# Patient Record
Sex: FEMALE | Race: BLACK OR AFRICAN AMERICAN | NOT HISPANIC OR LATINO | Employment: FULL TIME | URBAN - METROPOLITAN AREA
[De-identification: names, ages, dates, MRNs, and addresses within clinical notes are randomized per-mention and may not be internally consistent; named-entity substitution may affect disease eponyms.]

---

## 2017-01-16 ENCOUNTER — OFFICE VISIT (OUTPATIENT)
Dept: CARDIOLOGY | Facility: CLINIC | Age: 47
End: 2017-01-16
Payer: MEDICARE

## 2017-01-16 VITALS
SYSTOLIC BLOOD PRESSURE: 133 MMHG | HEIGHT: 65 IN | WEIGHT: 211 LBS | HEART RATE: 102 BPM | DIASTOLIC BLOOD PRESSURE: 90 MMHG | BODY MASS INDEX: 35.16 KG/M2

## 2017-01-16 DIAGNOSIS — E11.22 TYPE 2 DIABETES MELLITUS WITH CHRONIC KIDNEY DISEASE ON CHRONIC DIALYSIS, UNSPECIFIED LONG TERM INSULIN USE STATUS: ICD-10-CM

## 2017-01-16 DIAGNOSIS — N18.6 TYPE 2 DIABETES MELLITUS WITH CHRONIC KIDNEY DISEASE ON CHRONIC DIALYSIS, UNSPECIFIED LONG TERM INSULIN USE STATUS: ICD-10-CM

## 2017-01-16 DIAGNOSIS — I73.9 PAD (PERIPHERAL ARTERY DISEASE): ICD-10-CM

## 2017-01-16 DIAGNOSIS — M79.605 PAIN IN BOTH LOWER EXTREMITIES: ICD-10-CM

## 2017-01-16 DIAGNOSIS — I25.10 CORONARY ARTERY DISEASE INVOLVING NATIVE CORONARY ARTERY OF NATIVE HEART WITHOUT ANGINA PECTORIS: Primary | ICD-10-CM

## 2017-01-16 DIAGNOSIS — Z99.2 TYPE 2 DIABETES MELLITUS WITH CHRONIC KIDNEY DISEASE ON CHRONIC DIALYSIS, UNSPECIFIED LONG TERM INSULIN USE STATUS: ICD-10-CM

## 2017-01-16 DIAGNOSIS — I10 ESSENTIAL HYPERTENSION: ICD-10-CM

## 2017-01-16 DIAGNOSIS — M79.604 PAIN IN BOTH LOWER EXTREMITIES: ICD-10-CM

## 2017-01-16 DIAGNOSIS — E78.00 PURE HYPERCHOLESTEROLEMIA: ICD-10-CM

## 2017-01-16 PROCEDURE — 99999 PR PBB SHADOW E&M-EST. PATIENT-LVL III: CPT | Mod: PBBFAC,,, | Performed by: INTERNAL MEDICINE

## 2017-01-16 PROCEDURE — 99214 OFFICE O/P EST MOD 30 MIN: CPT | Mod: S$PBB,,, | Performed by: INTERNAL MEDICINE

## 2017-01-16 PROCEDURE — 99213 OFFICE O/P EST LOW 20 MIN: CPT | Mod: PBBFAC,PO | Performed by: INTERNAL MEDICINE

## 2017-01-16 RX ORDER — INSULIN HUMAN 100 [IU]/ML
200 INJECTION, SOLUTION PARENTERAL NIGHTLY
COMMUNITY
Start: 2017-01-13 | End: 2017-11-29

## 2017-01-16 RX ORDER — ASPIRIN 81 MG/1
81 TABLET ORAL DAILY
Qty: 30 TABLET | Refills: 12 | Status: ON HOLD
Start: 2017-01-16 | End: 2019-01-01 | Stop reason: HOSPADM

## 2017-01-16 NOTE — PATIENT INSTRUCTIONS
Taking Aspirin for Atherosclerosis       Aspirin is a medicine often prescribed to treat atherosclerosis. This condition affects your arteries. These are the blood vessels that carry blood away from your heart. Having atherosclerosis means youre at greater risk of a heart attack or stroke. Aspirin can help prevent these from happening.  How atherosclerosis affects your arteries   A fatty material (plaque) can build up in your arteries. This makes it harder for blood to flow through them. A blood clot can then form on the plaque. This may block the artery, cutting off blood flow. This can cause conditions such as coronary artery disease (CAD) and peripheral arterial disease (PAD):  · CAD occurs when plaque builds up in the coronary artery. This artery supplies the heart with oxygen-rich blood.  · PAD occurs when plaque forms in leg arteries.  The same things that cause CAD and PAD can also cause plaque to form in other arteries in your body, such as those in the brain. When plaque occurs in any of these arteries, it raises your risk of heart attack or stroke.  What aspirin does  Aspirin is a blood-thinner (antiplatelet medicine). It helps keep blood clots from forming. This reduces the risk of blockage. Aspirin can be taken daily if you are at high risk of or have already had a heart attack or stroke. It is also used after a procedure called a stent placement. This is when a tiny wire mesh tube, or stent, is placed in an artery to keep it open. Aspirin helps prevent blood clots from forming on the stent.  Taking aspirin safely  Tell your healthcare provider about any medicines you are taking. This includes:  · All prescription medicines  · Over-the-counter medicines  · Herbs, vitamins, and other supplements  Also mention if you have a history of ulcers or bleeding problems. Ask whether you will need to stop taking aspirin before having surgery or dental work. Always take medicines as directed.  Tips for taking  aspirin  · Have a routine. For example, take aspirin with the same meal each day.  · Dont take more than prescribed. A low dose gives the same benefit as a higher one, with a lower risk of side effects.  · Dont skip doses. Aspirin needs to be taken each day to work well.  · Keep track of what you take. A pillbox with days of the week can help, especially if you take several medicines. Or use a list or chart to keep track.  When to call your healthcare provider  Side effects of aspirin are not usually serious. If you do have problems, changing your dose may help. Call your healthcare provider if you have any of the following:  · Excessive bruising (some bruising is normal)  · Nosebleeds, bleeding gums, or other excessive bleeding  · An upset stomach or stomach pain   © 8748-1191 [employer]. [address] [place] [address], [place] [address]. All rights reserved. This information is not intended as a substitute for professional medical care. Always follow your healthcare professional's instructions.

## 2017-01-16 NOTE — PROGRESS NOTES
Subjective:   Patient ID:  Jenni Todd is a 46 y.o. female who presents for evaluation of Peripheral Arterial Disease; Hypertension; Hyperlipidemia; and Obesity      HPI:       She is here by recommendation of Dr. Watt (nephrology) and Dr. Yang (podiatry) for PAD. She denies any claudication or ulcers. She has diffuse body aches including peripheral neuropathy and lumbar radiculopathy. Dr. Yang noted weak pedal pulses during the last visit. She has CAD s/p PCI of RCA for NSTEMI in 2012 with two BMS 3.0 x 26 and 2.5 x 18 mm. She is on aspirin and plavix for DAPT. Normal EF. ESRD on PD, HTN, HLP, DM, Obesity        TINO 11/2016: medial calcinosis with monophasic waveforms      Arterial ultrasound 11/2016:   PSV in cm/sec      R ,pSFA 183, mSFA 220, dSFA 68, POP 51, PT 70, AT 66, and DP 24  L CFA  107,pSFA 139, mSFA 183, dSFA 99, , PT 18, AT 63, and DP 28      Echo 11/2016:      1 - Normal left ventricular systolic function (EF 60-65%).     2 - Normal left ventricular diastolic function.     3 - Normal right ventricular systolic function .     4 - Trivial to mild aortic stenosis, ARNOLD = 1.95 cm2, peak velocity = 2.1 m/s, mean gradient = 10.0 mmHg.     5 - Mild mitral regurgitation.             CT scan from 5/2016   Non contrast study   There was calcification of distal aorta and bilateral common iliac   No significant calcification of external iliac arteries       Patient Active Problem List    Diagnosis Date Noted    PAD (peripheral artery disease) 10/24/2016    Essential hypertension     Coronary artery disease involving native coronary artery of native heart without angina pectoris     Type 2 diabetes mellitus with diabetic chronic kidney disease     Vulvar abscess 04/15/2016    Gait abnormality 12/23/2015    Chronic low back pain 12/23/2015    DDD (degenerative disc disease), lumbar 12/23/2015    Candida albicans infection 09/14/2015    Lumbar radiculopathy 08/13/2015     Cerebral infarction due to embolism of middle cerebral artery 08/13/2015    Difficulty walking 08/03/2015    Thrombocytosis 07/14/2015    Hypoalbuminemia 07/14/2015    Hypomagnesemia 07/14/2015    Factor 5 Leiden mutation, heterozygous 07/14/2015    Von Willebrand disease 07/14/2015    Hypertension associated with diabetes 07/09/2015    Cerebellar stroke 07/07/2015    Generalized muscle weakness 06/24/2015    Sacral back pain 06/02/2015    Fall at home 06/02/2015    Hypertensive renal disease 12/10/2014    Spinal stenosis of sacral and sacrococcygeal region 07/23/2014    Bulging discs - symptomatic  05/16/2014    Awaiting organ transplant status 02/19/2013    Neuropathy     FSGS (focal segmental glomerulosclerosis) biopsy proven with collapsing features     Anemia associated with chronic renal failure      on Epogen      Hypertension     Hyperlipidemia     Obesity     CAD (coronary artery disease) with recent PCI 12/18/2012      Cath 12/27/2012:   RCA PCI 2.5 x 18 and 3.0 x 26 mm BMS   Patent LAD and LCX   Normal EF     Dr. Berrios for NSTEMI       DAPT score 3         Diabetes mellitus, type 2     ESRD (end stage renal disease) on PD ( initiated dialysis 04/20/2004) 04/20/2004     Nightly PD             Right Arm BP - Sitting: (P) 133/90  Left Arm BP - Sitting: (P) 130/90        LABS    LAST HbA1c  Lab Results   Component Value Date    HGBA1C 8.2 (H) 04/15/2016       Lipid panel  Lab Results   Component Value Date    CHOL 124 07/06/2015    CHOL 245 (H) 05/07/2014    CHOL 172 12/27/2012     Lab Results   Component Value Date    HDL 25 (L) 07/06/2015    HDL 25 (L) 05/07/2014    HDL 24 (L) 12/27/2012     Lab Results   Component Value Date    LDLCALC 75.6 07/06/2015    LDLCALC Invalid, Trig>400.0 05/07/2014    LDLCALC 98.0 12/27/2012     Lab Results   Component Value Date    TRIG 117 07/06/2015    TRIG 557 (H) 05/07/2014    TRIG 250 (H) 12/27/2012     Lab Results   Component Value Date     CHOLHDL 20.2 07/06/2015    CHOLHDL 10.2 (L) 05/07/2014    CHOLHDL 14.0 (L) 12/27/2012            Review of Systems   Constitution: Negative for diaphoresis, weakness, night sweats, weight gain and weight loss.   HENT: Negative for congestion.    Eyes: Negative for blurred vision, discharge and double vision.   Cardiovascular: Negative for chest pain, claudication, cyanosis, dyspnea on exertion, irregular heartbeat, leg swelling, near-syncope, orthopnea, palpitations, paroxysmal nocturnal dyspnea and syncope.   Respiratory: Negative for cough, shortness of breath and wheezing.    Endocrine: Negative for cold intolerance, heat intolerance and polyphagia.   Hematologic/Lymphatic: Negative for adenopathy and bleeding problem. Does not bruise/bleed easily.   Skin: Negative for dry skin and nail changes.   Musculoskeletal: Negative for arthritis, back pain, falls, joint pain, myalgias and neck pain.   Gastrointestinal: Negative for bloating, abdominal pain, change in bowel habit and constipation.   Genitourinary: Negative for bladder incontinence, dysuria, flank pain, genital sores and missed menses.   Neurological: Negative for aphonia, brief paralysis, difficulty with concentration and dizziness.   Psychiatric/Behavioral: Negative for altered mental status and memory loss. The patient does not have insomnia.    Allergic/Immunologic: Negative for environmental allergies.       Objective:   Physical Exam   Constitutional: She is oriented to person, place, and time. She appears well-developed and well-nourished. She is not intubated.   HENT:   Head: Normocephalic and atraumatic.   Right Ear: External ear normal.   Left Ear: External ear normal.   Mouth/Throat: Oropharynx is clear and moist.   Eyes: Conjunctivae and EOM are normal. Pupils are equal, round, and reactive to light. Right eye exhibits no discharge. Left eye exhibits no discharge. No scleral icterus.   Neck: Normal range of motion. Neck supple. Normal carotid  pulses, no hepatojugular reflux and no JVD present. Carotid bruit is not present. No tracheal deviation present. No thyromegaly present.   Cardiovascular: Normal rate, regular rhythm, S1 normal and S2 normal.   No extrasystoles are present. PMI is not displaced.  Exam reveals no gallop, no S3, no distant heart sounds, no friction rub and no midsystolic click.    Murmur heard.   Medium-pitched systolic murmur is present  at the upper right sternal border radiating to the neck  Pulses:       Carotid pulses are 2+ on the right side with bruit, and 2+ on the left side with bruit.       Radial pulses are 2+ on the right side, and 2+ on the left side.        Femoral pulses are 2+ on the right side, and 2+ on the left side.       Popliteal pulses are 2+ on the right side, and 2+ on the left side.        Dorsalis pedis pulses are 0 on the right side, and 0 on the left side.        Posterior tibial pulses are 0 on the right side, and 0 on the left side.       Biphasic R DP and monophasic R PT doppler signals  Biphasic L DP and PT doppler signals   Pulmonary/Chest: Effort normal and breath sounds normal. No accessory muscle usage or stridor. No apnea, no tachypnea and no bradypnea. She is not intubated. No respiratory distress. She has no decreased breath sounds. She has no wheezes. She has no rales. She exhibits no tenderness and no bony tenderness.   Abdominal: She exhibits no distension, no pulsatile liver, no abdominal bruit, no ascites, no pulsatile midline mass and no mass. There is no tenderness. There is no rebound and no guarding.   Musculoskeletal: Normal range of motion. She exhibits no edema or tenderness.   Lymphadenopathy:     She has no cervical adenopathy.   Neurological: She is alert and oriented to person, place, and time. She has normal reflexes. No cranial nerve deficit. Coordination normal.   Skin: Skin is warm. No rash noted. No erythema. No pallor.   Psychiatric: She has a normal mood and affect. Her  behavior is normal. Judgment and thought content normal.   Vitals reviewed.      Assessment:     1. Coronary artery disease involving native coronary artery of native heart without angina pectoris    2. Essential hypertension    3. PAD (peripheral artery disease)    4. Pure hypercholesterolemia    5. Type 2 diabetes 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, unspecified long term insulin use status    6. Pain in both lower extremities          Her limb pain is constant worse with supine position   Not classic for claudication   Likely neuropathy         Plan:             Continue with current medical plan and lifestyle changes.  Return sooner for concerns or questions. If symptoms persist go to the ED  I have reviewed all pertinent data on this patient         Consider pletal for medical therapy of PAD  CTA to assess patency of iliac arteries   For renal transplant      Aspirin  Statin  Arb/acei      She will try lyrica for neuropathy        Weight loss  Exercise  Treat neuropathy          I have reviewed the patient's medical history in detail and updated the computerized patient record.    Orders Placed This Encounter   Procedures    CTA Abdominal Aorta Bilateral Iliofem     Standing Status:   Future     Standing Expiration Date:   1/16/2018     Order Specific Question:   Reason for Exam:     Answer:   assess patency of iliac arteries, pre renal transplant     Order Specific Question:   Is the patient pregnant?     Answer:   No     Order Specific Question:   Is the patient allergic to iodine or contrast? Has a steroid / antihistamine prep been administered?     Answer:   No     Order Specific Question:   Is the patient on ANY Metformin drug such as Glugophage/Glucovance?           Should be off drug 48 hours after contrast. Check renal function before restart.     Answer:   No     Order Specific Question:   Age > 60 years?     Answer:   Yes     Order Specific Question:   History of Kidney Disease -  including: decreased kidney function, dialysis, kidney transplay, single kidney, kidney cancer, kidney surgery?     Answer:   Decreased Kidney Function     Comments:   ESRD     Order Specific Question:   Does the patient have high blood preasure requiring medical treatment?     Answer:   Yes     Order Specific Question:   Diabetes?     Answer:   Yes     Order Specific Question:   May the Radiologist modify the order per protocol to meet the clinical needs of the patient?     Answer:   Yes     Order Specific Question:   Recist criteria?     Answer:   No    Ambulatory consult to Pain Clinic     Referral Priority:   Routine     Referral Type:   Consultation     Referral Reason:   Specialty Services Required     Referred to Provider:   Jed Welsh MD     Requested Specialty:   Pain Medicine     Number of Visits Requested:   1       Follow up as scheduled. Return sooner for concerns or questions  Follow up in 4 to 6 weeks            She expressed verbal understanding and agreed with the plan            Addendum:        Lumbar MRI was reviewed   Not very impressive   She will see pain management           Patient's Medications   New Prescriptions    ASPIRIN (ECOTRIN) 81 MG EC TABLET    Take 1 tablet (81 mg total) by mouth once daily.   Previous Medications    AMLODIPINE (NORVASC) 10 MG TABLET    10 mg once daily.     ATORVASTATIN (LIPITOR) 40 MG TABLET    Take 1 tablet (40 mg total) by mouth every evening.    CALCITRIOL (ROCALTROL) 0.5 MCG CAP    Take 1 capsule by mouth once daily. 1 Capsule Oral Every day    CINACALCET (SENSIPAR) 90 MG TAB    Take 1 tablet by mouth once daily. 1 tablet Oral Every day    CLOPIDOGREL (PLAVIX) 75 MG TABLET    TAKE 1 BY MOUTH DAILY    GABAPENTIN (NEURONTIN) 300 MG CAPSULE    TAKE 1 BY MOUTH EVERY MORN-ING AND 2 BY MOUTH EVERY   EVENING    HUMULIN R 100 UNIT/ML INJECTION        LANTHANUM (FOSRENOL) 1000 MG CHEWABLE TABLET    Take 1,000 mg by mouth 3 (three) times daily with meals.       NATEGLINIDE (STARLIX) 120 MG TABLET    Take 1 tablet by mouth before meals as needed. 1 Tablet Oral As directed.  STARLIX 120MG 30 MINUTES BEFORE BREAKFAST AND SUPPER.    VITAMIN RENAL FORMULA, B-COMPLEX-VITAMIN C-FOLIC ACID, (B COMPLEX-C-FOLIC ACID) 1 MG CAP    Take 1 capsule by mouth once daily. 1 Capsule Oral Every day   Modified Medications    No medications on file   Discontinued Medications

## 2017-01-19 ENCOUNTER — TELEPHONE (OUTPATIENT)
Dept: CARDIOLOGY | Facility: CLINIC | Age: 47
End: 2017-01-19

## 2017-01-19 ENCOUNTER — HOSPITAL ENCOUNTER (OUTPATIENT)
Dept: RADIOLOGY | Facility: HOSPITAL | Age: 47
Discharge: HOME | End: 2017-01-19
Attending: INTERNAL MEDICINE
Payer: MEDICARE

## 2017-01-19 DIAGNOSIS — I73.9 PAD (PERIPHERAL ARTERY DISEASE): ICD-10-CM

## 2017-01-19 PROCEDURE — 75635 CT ANGIO ABDOMINAL ARTERIES: CPT | Mod: TC

## 2017-01-19 PROCEDURE — 25500020 PHARM REV CODE 255: Performed by: INTERNAL MEDICINE

## 2017-01-19 RX ADMIN — IOHEXOL 125 ML: 350 INJECTION, SOLUTION INTRAVENOUS at 12:01

## 2017-01-19 NOTE — TELEPHONE ENCOUNTER
----- Message from Calos Yousif MD sent at 1/18/2017  1:04 AM CST -----      Please set her to see Anitha as well        Tell her that we reviewed her MRI       May be he can also help with her leg pain        Thanks        ZN

## 2017-01-19 NOTE — TELEPHONE ENCOUNTER
Attempted to contact patient, no answer.      I will schedule appt with Dr Welsh and mail with a note to patient.

## 2017-02-16 DIAGNOSIS — I63.30 CEREBRAL INFARCTION DUE TO THROMBOSIS OF CEREBRAL ARTERY: ICD-10-CM

## 2017-02-17 RX ORDER — CLOPIDOGREL BISULFATE 75 MG/1
TABLET, FILM COATED ORAL
Qty: 90 TABLET | Refills: 1 | Status: SHIPPED | OUTPATIENT
Start: 2017-02-17 | End: 2017-03-02

## 2017-02-21 ENCOUNTER — HOSPITAL ENCOUNTER (INPATIENT)
Facility: HOSPITAL | Age: 47
LOS: 2 days | Discharge: HOME | DRG: 640 | End: 2017-02-24
Attending: EMERGENCY MEDICINE | Admitting: FAMILY MEDICINE
Payer: MEDICARE

## 2017-02-21 DIAGNOSIS — D63.1 ANEMIA ASSOCIATED WITH CHRONIC RENAL FAILURE: ICD-10-CM

## 2017-02-21 DIAGNOSIS — Z99.2 TYPE 2 DIABETES MELLITUS WITH CHRONIC KIDNEY DISEASE ON CHRONIC DIALYSIS, UNSPECIFIED LONG TERM INSULIN USE STATUS: ICD-10-CM

## 2017-02-21 DIAGNOSIS — I63.30 CEREBRAL INFARCTION DUE TO THROMBOSIS OF CEREBRAL ARTERY: ICD-10-CM

## 2017-02-21 DIAGNOSIS — N18.6 ESRD (END STAGE RENAL DISEASE): ICD-10-CM

## 2017-02-21 DIAGNOSIS — R51.9 HEADACHE, UNSPECIFIED HEADACHE TYPE: ICD-10-CM

## 2017-02-21 DIAGNOSIS — R19.7 DIARRHEA, UNSPECIFIED TYPE: ICD-10-CM

## 2017-02-21 DIAGNOSIS — Z86.73 H/O: CVA (CEREBROVASCULAR ACCIDENT): ICD-10-CM

## 2017-02-21 DIAGNOSIS — E86.9 VOLUME DEPLETION: ICD-10-CM

## 2017-02-21 DIAGNOSIS — E11.22 TYPE 2 DIABETES MELLITUS WITH CHRONIC KIDNEY DISEASE ON CHRONIC DIALYSIS, UNSPECIFIED LONG TERM INSULIN USE STATUS: ICD-10-CM

## 2017-02-21 DIAGNOSIS — I63.9 CEREBROVASCULAR ACCIDENT (CVA), UNSPECIFIED MECHANISM: ICD-10-CM

## 2017-02-21 DIAGNOSIS — E86.0 DEHYDRATION: Primary | ICD-10-CM

## 2017-02-21 DIAGNOSIS — G62.9 NEUROPATHY: ICD-10-CM

## 2017-02-21 DIAGNOSIS — N05.1 FSGS (FOCAL SEGMENTAL GLOMERULOSCLEROSIS): ICD-10-CM

## 2017-02-21 DIAGNOSIS — B34.9 VIRAL SYNDROME: ICD-10-CM

## 2017-02-21 DIAGNOSIS — R42 DIZZINESS: ICD-10-CM

## 2017-02-21 DIAGNOSIS — N18.9 ANEMIA ASSOCIATED WITH CHRONIC RENAL FAILURE: ICD-10-CM

## 2017-02-21 DIAGNOSIS — N18.6 TYPE 2 DIABETES MELLITUS WITH CHRONIC KIDNEY DISEASE ON CHRONIC DIALYSIS, UNSPECIFIED LONG TERM INSULIN USE STATUS: ICD-10-CM

## 2017-02-21 LAB
ALBUMIN SERPL BCP-MCNC: 3.2 G/DL
ALP SERPL-CCNC: 122 U/L
ALT SERPL W/O P-5'-P-CCNC: 16 U/L
ANION GAP SERPL CALC-SCNC: 24 MMOL/L
AST SERPL-CCNC: 15 U/L
BASOPHILS # BLD AUTO: 0.02 K/UL
BASOPHILS NFR BLD: 0.3 %
BILIRUB SERPL-MCNC: 0.4 MG/DL
BUN SERPL-MCNC: 59 MG/DL
CALCIUM SERPL-MCNC: 9.3 MG/DL
CHLORIDE SERPL-SCNC: 86 MMOL/L
CO2 SERPL-SCNC: 20 MMOL/L
CREAT SERPL-MCNC: 13.5 MG/DL
DIFFERENTIAL METHOD: ABNORMAL
EOSINOPHIL # BLD AUTO: 0.1 K/UL
EOSINOPHIL NFR BLD: 1.8 %
ERYTHROCYTE [DISTWIDTH] IN BLOOD BY AUTOMATED COUNT: 16.6 %
EST. GFR  (AFRICAN AMERICAN): 3 ML/MIN/1.73 M^2
EST. GFR  (NON AFRICAN AMERICAN): 3 ML/MIN/1.73 M^2
FLUAV AG SPEC QL IA: NEGATIVE
FLUBV AG SPEC QL IA: NEGATIVE
GLUCOSE SERPL-MCNC: 237 MG/DL
HCT VFR BLD AUTO: 31.2 %
HGB BLD-MCNC: 10.7 G/DL
LYMPHOCYTES # BLD AUTO: 2.5 K/UL
LYMPHOCYTES NFR BLD: 30.9 %
MCH RBC QN AUTO: 34.4 PG
MCHC RBC AUTO-ENTMCNC: 34.3 %
MCV RBC AUTO: 100 FL
MONOCYTES # BLD AUTO: 0.6 K/UL
MONOCYTES NFR BLD: 7.1 %
NEUTROPHILS # BLD AUTO: 4.8 K/UL
NEUTROPHILS NFR BLD: 59.8 %
PLATELET # BLD AUTO: 358 K/UL
PMV BLD AUTO: 11.1 FL
POCT GLUCOSE: 251 MG/DL (ref 70–110)
POTASSIUM SERPL-SCNC: 4.2 MMOL/L
PROT SERPL-MCNC: 8.5 G/DL
RBC # BLD AUTO: 3.11 M/UL
SODIUM SERPL-SCNC: 130 MMOL/L
SPECIMEN SOURCE: NORMAL
WBC # BLD AUTO: 7.93 K/UL

## 2017-02-21 PROCEDURE — 93005 ELECTROCARDIOGRAM TRACING: CPT

## 2017-02-21 PROCEDURE — 99285 EMERGENCY DEPT VISIT HI MDM: CPT | Mod: 25

## 2017-02-21 PROCEDURE — 80053 COMPREHEN METABOLIC PANEL: CPT

## 2017-02-21 PROCEDURE — 25000003 PHARM REV CODE 250: Performed by: EMERGENCY MEDICINE

## 2017-02-21 PROCEDURE — 82962 GLUCOSE BLOOD TEST: CPT

## 2017-02-21 PROCEDURE — 96360 HYDRATION IV INFUSION INIT: CPT

## 2017-02-21 PROCEDURE — 85025 COMPLETE CBC W/AUTO DIFF WBC: CPT

## 2017-02-21 PROCEDURE — 87400 INFLUENZA A/B EACH AG IA: CPT | Mod: 59

## 2017-02-21 PROCEDURE — 87040 BLOOD CULTURE FOR BACTERIA: CPT

## 2017-02-21 PROCEDURE — 93010 ELECTROCARDIOGRAM REPORT: CPT | Mod: ,,, | Performed by: INTERNAL MEDICINE

## 2017-02-21 PROCEDURE — 83605 ASSAY OF LACTIC ACID: CPT

## 2017-02-21 PROCEDURE — 96361 HYDRATE IV INFUSION ADD-ON: CPT

## 2017-02-21 RX ORDER — OXYCODONE AND ACETAMINOPHEN 5; 325 MG/1; MG/1
1 TABLET ORAL
Status: COMPLETED | OUTPATIENT
Start: 2017-02-21 | End: 2017-02-21

## 2017-02-21 RX ADMIN — OXYCODONE AND ACETAMINOPHEN 1 TABLET: 5; 325 TABLET ORAL at 11:02

## 2017-02-21 RX ADMIN — SODIUM CHLORIDE 1000 ML: 0.9 INJECTION, SOLUTION INTRAVENOUS at 11:02

## 2017-02-21 NOTE — IP AVS SNAPSHOT
[facility]  [address]  [place] [address]  Phone: [phone]           Patient Discharge Instructions     Our goal is to set you up for success. This packet includes information on your condition, medications, and your home care. It will help you to care for yourself so you don't get sicker and need to go back to the hospital.     Please ask your nurse if you have any questions.        There are many details to remember when preparing to leave the hospital. Here is what you will need to do:    1. Take your medicine. If you are prescribed medications, review your Medication List in the following pages. You may have new medications to  at the pharmacy and others that you'll need to stop taking. Review the instructions for how and when to take your medications. Talk with your doctor or nurses if you are unsure of what to do.     2. Go to your follow-up appointments. Specific follow-up information is listed in the following pages. Your may be contacted by a transition nurse or clinical provider about future appointments. Be sure we have all of the phone numbers to reach you, if needed. Please contact your provider's office if you are unable to make an appointment.     3. Watch for warning signs. Your doctor or nurse will give you detailed warning signs to watch for and when to call for assistance. These instructions may also include educational information about your condition. If you experience any of warning signs to your health, call your doctor.               ** Verify the list of medication(s) below is accurate and up to date. Carry this with you in case of emergency. If your medications have changed, please notify your healthcare provider.             Medication List      START taking these medications        Additional Info                      oxycodone-acetaminophen 5-325 mg per tablet   Commonly known as:  PERCOCET   Quantity:  10 tablet   Refills:  0   Dose:  1 tablet    Last  time this was given:  1 tablet on 2/21/2017 11:02 PM   Instructions:  Take 1 tablet by mouth every 6 (six) hours as needed for Pain.     Begin Date    AM    Noon    PM    Bedtime         CHANGE how you take these medications        Additional Info                      * PLAVIX 75 mg tablet   Quantity:  90 tablet   Refills:  1   What changed:  Another medication with the same name was added. Make sure you understand how and when to take each.   Generic drug:  clopidogrel    Last time this was given:  75 mg on 2/24/2017 10:14 AM   Instructions:  TAKE 1 BY MOUTH DAILY     Begin Date    AM    Noon    PM    Bedtime       * clopidogrel 75 mg tablet   Commonly known as:  PLAVIX   Quantity:  910 tablet   Refills:  1   Dose:  75 mg   What changed:  You were already taking a medication with the same name, and this prescription was added. Make sure you understand how and when to take each.    Last time this was given:  75 mg on 2/24/2017 10:14 AM   Instructions:  Take 1 tablet (75 mg total) by mouth once daily.     Begin Date    AM    Noon    PM    Bedtime       * Notice:  This list has 2 medication(s) that are the same as other medications prescribed for you. Read the directions carefully, and ask your doctor or other care provider to review them with you.      CONTINUE taking these medications        Additional Info                      amlodipine 10 MG tablet   Commonly known as:  NORVASC   Refills:  0   Dose:  10 mg    Last time this was given:  10 mg on 2/24/2017 10:14 AM   Instructions:  10 mg once daily.     Begin Date    AM    Noon    PM    Bedtime       aspirin 81 MG EC tablet   Commonly known as:  ECOTRIN   Quantity:  30 tablet   Refills:  12   Dose:  81 mg    Last time this was given:  81 mg on 2/24/2017 10:14 AM   Instructions:  Take 1 tablet (81 mg total) by mouth once daily.     Begin Date    AM    Noon    PM    Bedtime       atorvastatin 40 MG tablet   Commonly known as:  LIPITOR   Quantity:  90 tablet    Refills:  1   Dose:  40 mg    Instructions:  Take 1 tablet (40 mg total) by mouth every evening.     Begin Date    AM    Noon    PM    Bedtime       calcitRIOL 0.5 MCG Cap   Commonly known as:  ROCALTROL   Refills:  0   Dose:  1 capsule    Last time this was given:  0.5 mcg on 2/24/2017 10:14 AM   Instructions:  Take 1 capsule by mouth once daily. 1 Capsule Oral Every day     Begin Date    AM    Noon    PM    Bedtime       gabapentin 300 MG capsule   Commonly known as:  NEURONTIN   Quantity:  90 capsule   Refills:  0    Instructions:  TAKE 1 BY MOUTH EVERY MORN-ING AND 2 BY MOUTH EVERY   EVENING     Begin Date    AM    Noon    PM    Bedtime       HUMULIN R 100 unit/mL injection   Refills:  0   Generic drug:  insulin regular      Begin Date    AM    Noon    PM    Bedtime       lanthanum 1000 MG chewable tablet   Commonly known as:  FOSRENOL   Refills:  0   Dose:  1000 mg    Last time this was given:  1,000 mg on 2/24/2017 11:53 AM   Instructions:  Take 1,000 mg by mouth 3 (three) times daily with meals.     Begin Date    AM    Noon    PM    Bedtime       NEPHROCAPS 1 mg Cap   Refills:  0   Dose:  1 capsule   Generic drug:  vitamin renal formula (B-complex-vitamin c-folic acid)    Last time this was given:  1 capsule on 2/24/2017 10:21 AM   Instructions:  Take 1 capsule by mouth once daily. 1 Capsule Oral Every day     Begin Date    AM    Noon    PM    Bedtime       SENSIPAR 90 MG Tab   Refills:  0   Dose:  1 tablet   Generic drug:  cinacalcet    Last time this was given:  90 mg on 2/24/2017 10:13 AM   Instructions:  Take 1 tablet by mouth once daily. 1 tablet Oral Every day     Begin Date    AM    Noon    PM    Bedtime       STARLIX 120 MG tablet   Refills:  0   Dose:  1 tablet   Generic drug:  nateglinide    Instructions:  Take 1 tablet by mouth before meals as needed. 1 Tablet Oral As directed.  STARLIX 120MG 30 MINUTES BEFORE BREAKFAST AND SUPPER.     Begin Date    AM    Noon    PM    Bedtime            Where to  Get Your Medications      You can get these medications from any pharmacy     Bring a paper prescription for each of these medications     atorvastatin 40 MG tablet    clopidogrel 75 mg tablet    oxycodone-acetaminophen 5-325 mg per tablet                  Please bring to all follow up appointments:    1. A copy of your discharge instructions.  2. All medicines you are currently taking in their original bottles.  3. Identification and insurance card.    Please arrive 15 minutes ahead of scheduled appointment time.    Please call 24 hours in advance if you must reschedule your appointment and/or time.        Follow-up Information     Follow up with [facility] In 1 week.    Specialty:  Family Medicine    Why:  follow up with PCP    Contact information:    [address], [address]  [facility] [address] [phone]        Discharge Instructions     Future Orders    Activity as tolerated     Call MD for:  severe persistent headache     Diet general     Questions:    Total calories:      Fat restriction, if any:      Protein restriction, if any:      Na restriction, if any:      Fluid restriction:      Additional restrictions:          Discharge Instructions         Dehydration (Adult)  Dehydration occurs when your body loses too much fluid. This may be the result of prolonged vomiting or diarrhea, excessive sweating, or a high fever. It may also happen if you dont drink enough fluid when youre sick or out in the heat. Misuse of diuretics (water pills) can also be a cause.  Symptoms include thirst and decreased urine output. You may also feel dizzy, weak, fatigued, or very drowsy. The diet described below is usually enough to treat dehydration. In some cases, you may need medicine.  Home care  · Drink at least 12 8-ounce glasses of fluid every day to resolve the dehydration. Fluid may include water; orange juice; lemonade; apple, grape, or cranberry juice; clear fruit drinks;  electrolyte replacement and sports drinks; and teas and coffee without caffeine. If you have been diagnosed with a kidney disease, ask your doctor how much and what types of fluids you should drink to prevent dehydration. If you have kidney disease, fluid can build up in the body. This can be dangerous to your health.  · If you have a fever, muscle aches, or a headache as a result of a cold or flu, you may take acetaminophen or ibuprofen, unless another medicine was prescribed. If you have chronic liver or kidney disease, or have ever had a stomach ulcer or gastrointestinal bleeding, talk with your health care provider before using these medicines. Don't take aspirin if you are younger than 18 and have a fever. Aspirin raises the chance for severe liver injury.  Follow-up care  Follow up with your health care provider, or as advised.  When to seek medical advice  Call your health care provider right away if any of these occur:  · Continued vomiting  · Frequent diarrhea (more than 5 times a day); blood (red or black color) or mucus in diarrhea  · Blood in vomit or stool  · Swollen abdomen or increasing abdominal pain  · Weakness, dizziness, or fainting  · Unusual drowsiness or confusion  · Reduced urine output or extreme thirst  · Fever of 100.4°F (34°C) or higher  Date Last Reviewed: 5/31/2015  © 4068-8142 [employer]. [address] [place] [address]. All rights reserved. This information is not intended as a substitute for professional medical care. Always follow your healthcare professional's instructions.            Primary Diagnosis     Your primary diagnosis was:  Stroke      Admission Information     Date & Time Provider Department [facility]    2/21/2017  9:37 PM Michael Tan III, MD [facility] [id number]      Care Providers     Provider Role Specialty Primary office phone    Michael Tan III, MD Attending Provider Family Medicine [phone]    Gurvinder Watt MD Consulting  Physician  Nephrology [phone]    Lucia Harrison MD Consulting Physician  Neurology [phone]      Your Vitals Were     BP                   173/86 (BP Method: Automatic)           Recent Lab Values        4/18/2012 4/19/2012 12/27/2012 5/7/2014 7/6/2015 4/15/2016 2/22/2017         6:30 PM  9:30 AM  3:40 AM  6:56 AM  5:10 PM 11:17 PM 10:07 AM     A1C 10.2 (H) 9.5 (H) 7.1 (H) 7.8 (H) 5.9 8.2 (H) 9.0 (H)         7.8 (H)        Comment for A1C at 10:07 AM on 2/22/2017:  According to ADA guidelines, hemoglobin A1C <7.0% represents  optimal control in non-pregnant diabetic patients.  Different  metrics may apply to specific populations.   Standards of Medical Care in Diabetes - 2016.  For the purpose of screening for the presence of diabetes:  <5.7%     Consistent with the absence of diabetes  5.7-6.4%  Consistent with increasing risk for diabetes   (prediabetes)  >or=6.5%  Consistent with diabetes  Currently no consensus exists for use of hemoglobin A1C  for diagnosis of diabetes for children.        Pending Labs     Order Current Status    RPR In process    Blood Culture #1 **CANNOT BE ORDERED STAT** Preliminary result    Blood Culture #2 **CANNOT BE ORDERED STAT** Preliminary result      Allergies as of 2/24/2017        Reactions    Clindamycin Diarrhea    Flagyl [Metronidazole Hcl]       [facility]     [facility] Nurse Care Line - 24/7 Assistance  Unless otherwise directed by your provider, please contact [facility], our nurse care line that is available for 24/7 assistance.     Registered nurses in the [facility] provide clinical advisement, health education, appointment booking, and other advisory services.  Call for this free service at [phone].        Advance Directives     An advance directive is a document which, in the event you are no longer able to make decisions for yourself, tells your healthcare team what kind of treatment you do or do not want to receive, or  who you would like to make those decisions for you.  If you do not currently have an advance directive, [facility] encourages you to create one.  For more information call:  [phone]), [phone] ([phone]),  or log on to [email or web address].        Language Assistance Services     ATTENTION: Language assistance services are available, free of charge. Please call [phone].      ATENCIÓN: Si habla español, tiene a yuen disposición servicios gratuitos de asistencia lingüística. Llame al [phone].     CHÚ Ý: N?u b?n nói Ti?ng Vi?t, có các d?ch v? h? tr? ngôn ng? mi?n phí dành cho b?n. G?i s? [phone].        Stroke Education              Chronic Kindey Disease Education             Diabetes Discharge Instructions                                    [facility] complies with applicable Federal civil rights laws and does not discriminate on the basis of race, color, national origin, age, disability, or sex.

## 2017-02-21 NOTE — ED AVS SNAPSHOT
[facility]  [address]  [place] [address]               Jenni Todd   2017  9:37 PM   ED    Description:  Female : 1970   Department:  [facility]           Your Care was Coordinated By:     Provider Role From To    Madelin Hummel MD Attending Provider [phone] --      Reason for Visit     Headache           Diagnoses this Visit        Comments    Dehydration    -  Primary     Viral syndrome         Headache, unspecified headache type         Diarrhea, unspecified type           ED Disposition     None           To Do List           Follow-up Information     Follow up with Michael Tan Iii, MD. Go today.    Specialty:  Family Medicine    Why:  at 3:40    Contact information:    [address]  [facility] [address]  [place] [address]  [phone]         These Medications        Disp Refills Start End    oxycodone-acetaminophen (PERCOCET) 5-325 mg per tablet 10 tablet 0 2017     Take 1 tablet by mouth every 6 (six) hours as needed for Pain. - Oral    [id number] [address] Ph #: [phone]         [facility]     [facility] Nurse Care Line -  Assistance  Registered nurses in the [facility] provide clinical advisement, health education, appointment booking, and other advisory services.  Call for this free service at [phone].             Medications           Message regarding Medications     Verify the changes and/or additions to your medication regime listed below are the same as discussed with your clinician today.  If any of these changes or additions are incorrect, please notify your healthcare provider.        START taking these NEW medications        Refills    oxycodone-acetaminophen (PERCOCET) 5-325 mg per tablet 0    Sig: Take 1 tablet by mouth every 6 (six) hours as needed for Pain.    Class: Print    Route: Oral      These medications were  administered today        Dose Freq    sodium chloride 0.9% bolus 1,000 mL 1,000 mL ED 1 Time    Sig: Inject 1,000 mLs into the vein ED 1 Time.    Class: Normal    Route: Intravenous    oxycodone-acetaminophen 5-325 mg per tablet 1 tablet 1 tablet ED 1 Time    Sig: Take 1 tablet by mouth ED 1 Time.    Class: Normal    Route: Oral    acetaminophen tablet 650 mg 650 mg ED 1 Time    Sig: Take 2 tablets (650 mg total) by mouth ED 1 Time.    Class: Normal    Route: Oral           Verify that the below list of medications is an accurate representation of the medications you are currently taking.  If none reported, the list may be blank. If incorrect, please contact your healthcare provider. Carry this list with you in case of emergency.           Current Medications     amlodipine (NORVASC) 10 MG tablet 10 mg once daily.     aspirin (ECOTRIN) 81 MG EC tablet Take 1 tablet (81 mg total) by mouth once daily.    atorvastatin (LIPITOR) 40 MG tablet Take 1 tablet (40 mg total) by mouth every evening.    calcitRIOL (ROCALTROL) 0.5 MCG Cap Take 1 capsule by mouth once daily. 1 Capsule Oral Every day    cinacalcet (SENSIPAR) 90 MG Tab Take 1 tablet by mouth once daily. 1 tablet Oral Every day    gabapentin (NEURONTIN) 300 MG capsule TAKE 1 BY MOUTH EVERY MORN-ING AND 2 BY MOUTH EVERY   EVENING    HUMULIN R 100 unit/mL injection     lanthanum (FOSRENOL) 1000 MG chewable tablet Take 1,000 mg by mouth 3 (three) times daily with meals.      nateglinide (STARLIX) 120 MG tablet Take 1 tablet by mouth before meals as needed. 1 Tablet Oral As directed.  STARLIX 120MG 30 MINUTES BEFORE BREAKFAST AND SUPPER.    oxycodone-acetaminophen (PERCOCET) 5-325 mg per tablet Take 1 tablet by mouth every 6 (six) hours as needed for Pain.    PLAVIX 75 mg tablet TAKE 1 BY MOUTH DAILY    vitamin renal formula, B-complex-vitamin c-folic acid, (B COMPLEX-C-FOLIC ACID) 1 mg Cap Take 1 capsule by mouth once daily. 1 Capsule Oral Every day           Clinical  "Reference Information           Your Vitals Were     BP Pulse Temp Resp Height Weight    107/80 86 99.3 °F (37.4 °C) (Oral) 19 5' 7" (1.702 m) 96.2 kg (212 lb)    Last Period SpO2 BMI          01/28/2014 (Approximate) 98% 33.2 kg/m2        Allergies as of 2/22/2017        Reactions    Clindamycin Diarrhea    Flagyl [Metronidazole Hcl]       Immunizations Administered on Date of Encounter - 2/22/2017     None      ED Micro, Lab, POCT     Start Ordered       Status Ordering Provider    02/22/17 0000 02/21/17 2227  Lactic acid, plasma  Every 4 hours     Start Status   02/22/17 0000 Final result   02/22/17 0400 Acknowledged       Acknowledged     02/21/17 2228 02/21/17 2227  Blood Culture #1 **CANNOT BE ORDERED STAT**  Once      In process     02/21/17 2228 02/21/17 2227  Blood Culture #2 **CANNOT BE ORDERED STAT**  Once      In process     02/21/17 2226 02/21/17 2227  CBC auto differential  STAT      Final result     02/21/17 2226 02/21/17 2227  Comprehensive metabolic panel  STAT      Final result     02/21/17 2226 02/21/17 2227  Influenza antigen Nasopharyngeal Swab  Once      Final result     02/21/17 2226 02/21/17 2227  Lactic acid, plasma  STAT      Final result     02/21/17 2202 02/21/17 2202  POCT glucose  Once      Final result       ED Imaging Orders     Start Ordered       Status Ordering Provider    02/21/17 2227 02/21/17 2227  X-Ray Chest AP Portable  1 time imaging      Final result     02/21/17 2227 02/21/17 2227  CT Head Without Contrast  1 time imaging      Final result         Discharge Instructions         Dehydration (Adult)  Dehydration occurs when your body loses too much fluid. This may be the result of prolonged vomiting or diarrhea, excessive sweating, or a high fever. It may also happen if you dont drink enough fluid when youre sick or out in the heat. Misuse of diuretics (water pills) can also be a cause.  Symptoms include thirst and decreased urine output. You may also feel dizzy, weak, " fatigued, or very drowsy. The diet described below is usually enough to treat dehydration. In some cases, you may need medicine.  Home care  · Drink at least 12 8-ounce glasses of fluid every day to resolve the dehydration. Fluid may include water; orange juice; lemonade; apple, grape, or cranberry juice; clear fruit drinks; electrolyte replacement and sports drinks; and teas and coffee without caffeine. If you have been diagnosed with a kidney disease, ask your doctor how much and what types of fluids you should drink to prevent dehydration. If you have kidney disease, fluid can build up in the body. This can be dangerous to your health.  · If you have a fever, muscle aches, or a headache as a result of a cold or flu, you may take acetaminophen or ibuprofen, unless another medicine was prescribed. If you have chronic liver or kidney disease, or have ever had a stomach ulcer or gastrointestinal bleeding, talk with your health care provider before using these medicines. Don't take aspirin if you are younger than 18 and have a fever. Aspirin raises the chance for severe liver injury.  Follow-up care  Follow up with your health care provider, or as advised.  When to seek medical advice  Call your health care provider right away if any of these occur:  · Continued vomiting  · Frequent diarrhea (more than 5 times a day); blood (red or black color) or mucus in diarrhea  · Blood in vomit or stool  · Swollen abdomen or increasing abdominal pain  · Weakness, dizziness, or fainting  · Unusual drowsiness or confusion  · Reduced urine output or extreme thirst  · Fever of 100.4°F (34°C) or higher  Date Last Reviewed: 5/31/2015  © 0403-7386 [employer]. [address] [place] [address], [place] [address]. All rights reserved. This information is not intended as a substitute for professional medical care. Always follow your healthcare professional's instructions.          Your Scheduled Appointments     Feb 22, 2017  3:40 PM  CST   Established Patient Visit with Esteban Springer MD   [facility] ([facility])    [address], [address]  [facility] [facility] [address] [phone]               [facility] complies with applicable Federal civil rights laws and does not discriminate on the basis of race, color, national origin, age, disability, or sex.        Language Assistance Services     ATTENTION: Language assistance services are available, free of charge. Please call [phone].      ATENCIÓN: Si habla lacy, tiene a yuen disposición servicios gratuitos de asistencia lingüística. Llame al [phone].     CHÚ Ý: N?u b?n nói Ti?ng Vi?t, có các d?ch v? h? tr? ngôn ng? mi?n phí dành cho b?n. G?i s? [phone].

## 2017-02-22 PROBLEM — E86.9 VOLUME DEPLETION: Status: ACTIVE | Noted: 2017-02-22

## 2017-02-22 PROBLEM — E86.0 DEHYDRATION: Status: ACTIVE | Noted: 2017-02-22

## 2017-02-22 LAB
ALBUMIN SERPL BCP-MCNC: 3.2 G/DL
ALP SERPL-CCNC: 117 U/L
ALT SERPL W/O P-5'-P-CCNC: 14 U/L
ANION GAP SERPL CALC-SCNC: 22 MMOL/L
AST SERPL-CCNC: 12 U/L
BASOPHILS # BLD AUTO: 0.03 K/UL
BASOPHILS NFR BLD: 0.4 %
BILIRUB SERPL-MCNC: 0.4 MG/DL
BUN SERPL-MCNC: 59 MG/DL
CALCIUM SERPL-MCNC: 8.8 MG/DL
CHLORIDE SERPL-SCNC: 89 MMOL/L
CO2 SERPL-SCNC: 19 MMOL/L
CREAT SERPL-MCNC: 13.4 MG/DL
DIFFERENTIAL METHOD: ABNORMAL
EOSINOPHIL # BLD AUTO: 0.1 K/UL
EOSINOPHIL NFR BLD: 1.5 %
ERYTHROCYTE [DISTWIDTH] IN BLOOD BY AUTOMATED COUNT: 16.7 %
EST. GFR  (AFRICAN AMERICAN): 3 ML/MIN/1.73 M^2
EST. GFR  (NON AFRICAN AMERICAN): 3 ML/MIN/1.73 M^2
ESTIMATED AVG GLUCOSE: 212 MG/DL
GLUCOSE SERPL-MCNC: 171 MG/DL
HBA1C MFR BLD HPLC: 9 %
HCT VFR BLD AUTO: 32.2 %
HGB BLD-MCNC: 10.9 G/DL
LACTATE SERPL-SCNC: 1.7 MMOL/L
LACTATE SERPL-SCNC: 2.1 MMOL/L
LYMPHOCYTES # BLD AUTO: 2.9 K/UL
LYMPHOCYTES NFR BLD: 40.2 %
MAGNESIUM SERPL-MCNC: 1.6 MG/DL
MCH RBC QN AUTO: 34.1 PG
MCHC RBC AUTO-ENTMCNC: 33.9 %
MCV RBC AUTO: 101 FL
MONOCYTES # BLD AUTO: 0.6 K/UL
MONOCYTES NFR BLD: 8.3 %
NEUTROPHILS # BLD AUTO: 3.5 K/UL
NEUTROPHILS NFR BLD: 49.3 %
PHOSPHATE SERPL-MCNC: 9 MG/DL
PLATELET # BLD AUTO: 343 K/UL
PMV BLD AUTO: 10.8 FL
POCT GLUCOSE: 174 MG/DL (ref 70–110)
POCT GLUCOSE: 191 MG/DL (ref 70–110)
POTASSIUM SERPL-SCNC: 4.1 MMOL/L
PROT SERPL-MCNC: 8.1 G/DL
RBC # BLD AUTO: 3.2 M/UL
SODIUM SERPL-SCNC: 130 MMOL/L
WBC # BLD AUTO: 7.19 K/UL

## 2017-02-22 PROCEDURE — 83735 ASSAY OF MAGNESIUM: CPT

## 2017-02-22 PROCEDURE — 25000003 PHARM REV CODE 250: Performed by: STUDENT IN AN ORGANIZED HEALTH CARE EDUCATION/TRAINING PROGRAM

## 2017-02-22 PROCEDURE — 36415 COLL VENOUS BLD VENIPUNCTURE: CPT

## 2017-02-22 PROCEDURE — 86706 HEP B SURFACE ANTIBODY: CPT

## 2017-02-22 PROCEDURE — 25000003 PHARM REV CODE 250: Performed by: FAMILY MEDICINE

## 2017-02-22 PROCEDURE — 90945 DIALYSIS ONE EVALUATION: CPT

## 2017-02-22 PROCEDURE — 86706 HEP B SURFACE ANTIBODY: CPT | Mod: 91

## 2017-02-22 PROCEDURE — 80053 COMPREHEN METABOLIC PANEL: CPT

## 2017-02-22 PROCEDURE — 83605 ASSAY OF LACTIC ACID: CPT

## 2017-02-22 PROCEDURE — 11000001 HC ACUTE MED/SURG PRIVATE ROOM

## 2017-02-22 PROCEDURE — 84100 ASSAY OF PHOSPHORUS: CPT

## 2017-02-22 PROCEDURE — 25000003 PHARM REV CODE 250: Performed by: EMERGENCY MEDICINE

## 2017-02-22 PROCEDURE — 83036 HEMOGLOBIN GLYCOSYLATED A1C: CPT

## 2017-02-22 PROCEDURE — 85025 COMPLETE CBC W/AUTO DIFF WBC: CPT

## 2017-02-22 PROCEDURE — 63600175 PHARM REV CODE 636 W HCPCS: Performed by: STUDENT IN AN ORGANIZED HEALTH CARE EDUCATION/TRAINING PROGRAM

## 2017-02-22 PROCEDURE — 86704 HEP B CORE ANTIBODY TOTAL: CPT

## 2017-02-22 PROCEDURE — 87340 HEPATITIS B SURFACE AG IA: CPT

## 2017-02-22 RX ORDER — IBUPROFEN 200 MG
24 TABLET ORAL
Status: DISCONTINUED | OUTPATIENT
Start: 2017-02-22 | End: 2017-02-24 | Stop reason: HOSPADM

## 2017-02-22 RX ORDER — GLUCAGON 1 MG
1 KIT INJECTION
Status: DISCONTINUED | OUTPATIENT
Start: 2017-02-22 | End: 2017-02-22

## 2017-02-22 RX ORDER — IBUPROFEN 200 MG
16 TABLET ORAL
Status: DISCONTINUED | OUTPATIENT
Start: 2017-02-22 | End: 2017-02-22

## 2017-02-22 RX ORDER — INSULIN ASPART 100 [IU]/ML
1-10 INJECTION, SOLUTION INTRAVENOUS; SUBCUTANEOUS
Status: DISCONTINUED | OUTPATIENT
Start: 2017-02-22 | End: 2017-02-24 | Stop reason: HOSPADM

## 2017-02-22 RX ORDER — ACETAMINOPHEN 325 MG/1
650 TABLET ORAL
Status: COMPLETED | OUTPATIENT
Start: 2017-02-22 | End: 2017-02-22

## 2017-02-22 RX ORDER — ACETAMINOPHEN 325 MG/1
650 TABLET ORAL EVERY 6 HOURS PRN
Status: DISCONTINUED | OUTPATIENT
Start: 2017-02-22 | End: 2017-02-24

## 2017-02-22 RX ORDER — CALCITRIOL 0.25 UG/1
0.5 CAPSULE ORAL DAILY
Status: DISCONTINUED | OUTPATIENT
Start: 2017-02-22 | End: 2017-02-24 | Stop reason: HOSPADM

## 2017-02-22 RX ORDER — LANTHANUM CARBONATE 500 MG/1
1000 TABLET, CHEWABLE ORAL
Status: DISCONTINUED | OUTPATIENT
Start: 2017-02-22 | End: 2017-02-24 | Stop reason: HOSPADM

## 2017-02-22 RX ORDER — CLOPIDOGREL BISULFATE 75 MG/1
75 TABLET ORAL DAILY
Status: DISCONTINUED | OUTPATIENT
Start: 2017-02-22 | End: 2017-02-24 | Stop reason: HOSPADM

## 2017-02-22 RX ORDER — OXYCODONE AND ACETAMINOPHEN 5; 325 MG/1; MG/1
1 TABLET ORAL EVERY 6 HOURS PRN
Qty: 10 TABLET | Refills: 0 | Status: SHIPPED | OUTPATIENT
Start: 2017-02-22 | End: 2017-11-29

## 2017-02-22 RX ORDER — CINACALCET 30 MG/1
90 TABLET, FILM COATED ORAL DAILY
Status: DISCONTINUED | OUTPATIENT
Start: 2017-02-22 | End: 2017-02-24 | Stop reason: HOSPADM

## 2017-02-22 RX ORDER — SODIUM CHLORIDE 9 MG/ML
INJECTION, SOLUTION INTRAVENOUS CONTINUOUS
Status: ACTIVE | OUTPATIENT
Start: 2017-02-22 | End: 2017-02-22

## 2017-02-22 RX ORDER — ONDANSETRON 2 MG/ML
4 INJECTION INTRAMUSCULAR; INTRAVENOUS EVERY 6 HOURS PRN
Status: DISCONTINUED | OUTPATIENT
Start: 2017-02-22 | End: 2017-02-24 | Stop reason: HOSPADM

## 2017-02-22 RX ORDER — INSULIN ASPART 100 [IU]/ML
0-5 INJECTION, SOLUTION INTRAVENOUS; SUBCUTANEOUS
Status: DISCONTINUED | OUTPATIENT
Start: 2017-02-22 | End: 2017-02-22

## 2017-02-22 RX ORDER — ASPIRIN 81 MG/1
81 TABLET ORAL DAILY
Status: DISCONTINUED | OUTPATIENT
Start: 2017-02-22 | End: 2017-02-24 | Stop reason: HOSPADM

## 2017-02-22 RX ORDER — SODIUM CHLORIDE 9 MG/ML
500 INJECTION, SOLUTION INTRAVENOUS
Status: COMPLETED | OUTPATIENT
Start: 2017-02-22 | End: 2017-02-22

## 2017-02-22 RX ORDER — IBUPROFEN 200 MG
24 TABLET ORAL
Status: DISCONTINUED | OUTPATIENT
Start: 2017-02-22 | End: 2017-02-22

## 2017-02-22 RX ORDER — AMLODIPINE BESYLATE 5 MG/1
10 TABLET ORAL DAILY
Status: DISCONTINUED | OUTPATIENT
Start: 2017-02-22 | End: 2017-02-24 | Stop reason: HOSPADM

## 2017-02-22 RX ORDER — GLUCAGON 1 MG
1 KIT INJECTION
Status: DISCONTINUED | OUTPATIENT
Start: 2017-02-22 | End: 2017-02-24 | Stop reason: HOSPADM

## 2017-02-22 RX ORDER — IBUPROFEN 200 MG
16 TABLET ORAL
Status: DISCONTINUED | OUTPATIENT
Start: 2017-02-22 | End: 2017-02-24 | Stop reason: HOSPADM

## 2017-02-22 RX ADMIN — CALCITRIOL 0.5 MCG: 0.25 CAPSULE, LIQUID FILLED ORAL at 08:02

## 2017-02-22 RX ADMIN — INSULIN ASPART 4 UNITS: 100 INJECTION, SOLUTION INTRAVENOUS; SUBCUTANEOUS at 11:02

## 2017-02-22 RX ADMIN — LANTHANUM CARBONATE 1000 MG: 500 TABLET, CHEWABLE ORAL at 06:02

## 2017-02-22 RX ADMIN — AMLODIPINE BESYLATE 10 MG: 5 TABLET ORAL at 08:02

## 2017-02-22 RX ADMIN — SODIUM CHLORIDE 500 ML: 0.9 INJECTION, SOLUTION INTRAVENOUS at 04:02

## 2017-02-22 RX ADMIN — CINACALCET HYDROCHLORIDE 90 MG: 30 TABLET, COATED ORAL at 08:02

## 2017-02-22 RX ADMIN — LANTHANUM CARBONATE 1000 MG: 500 TABLET, CHEWABLE ORAL at 08:02

## 2017-02-22 RX ADMIN — ASCORBIC ACID, FOLIC ACID, NIACIN, THIAMINE, RIBOFLAVIN, PYRIDOXINE, CYANOCOBALAMIN, PANTOTHENIC ACID, BIOTIN 1 CAPSULE: 100; 1.5; 1.7; 20; 10; 1; 6; 150; 5 CAPSULE, LIQUID FILLED ORAL at 08:02

## 2017-02-22 RX ADMIN — SODIUM CHLORIDE: 0.9 INJECTION, SOLUTION INTRAVENOUS at 05:02

## 2017-02-22 RX ADMIN — ACETAMINOPHEN 650 MG: 325 TABLET ORAL at 01:02

## 2017-02-22 RX ADMIN — CLOPIDOGREL 75 MG: 75 TABLET, FILM COATED ORAL at 08:02

## 2017-02-22 RX ADMIN — ACETAMINOPHEN 650 MG: 325 TABLET ORAL at 08:02

## 2017-02-22 RX ADMIN — ASPIRIN 81 MG: 81 TABLET, COATED ORAL at 08:02

## 2017-02-22 RX ADMIN — ACETAMINOPHEN 650 MG: 325 TABLET ORAL at 11:02

## 2017-02-22 RX ADMIN — ACETAMINOPHEN 650 MG: 325 TABLET ORAL at 02:02

## 2017-02-22 NOTE — ED NOTES
Call to inform Family Medicine of pt c/o of headache, elevated BP and abnormal Phos.New orders received

## 2017-02-22 NOTE — ED NOTES
Report received from Verito and care assumed.Pt stable resting quietly in bed with c/o pain 4/10.Pt reports a frontal headache.BP elevated.Resp even and unlabored.Abd soft and nontender,obese.Pt presently on stretcher which is locked in lowest position with side rails up times 2. Comfort measures in place.Will cont to monitor pt.

## 2017-02-22 NOTE — ED PROVIDER NOTES
Encounter Date: 2/21/2017       History     Chief Complaint   Patient presents with    Headache     that began on FRI, + dizzy, - vision changes pt also reports weakness and hypotension that began today, pt is a dialylsis pt      Review of patient's allergies indicates:   Allergen Reactions    Clindamycin Diarrhea    Flagyl [metronidazole hcl]      HPI Comments: This is a 47 y/o F with multiple co morbidities including ESRD, FSGS, CAD, HTN and obesity who presents with headache since Friday, fatigue over the weekend and diarrhea since yesterday.  Patient does peritoneal dialysis daily.  Denies abdominal pain or cloudy diasylate.  Denies vomiting. Reports decreased appetite and very poor intake over past 2 days.  Headache is frontal and not associated with neck stiffness or photophobia.  Unsure if she has had fever at home. Patient has had clostridium difficile previously.  She has treated the headache with over the counter medication with minimal relief.  No sick contacts.  No fall or trauma.  No visual changes or focal weakness or numbness. Patient feels more dizzy and headache is worse when standing up or sitting up.      Past Medical History   Diagnosis Date    Abnormal finding on Pap smear, HPV DNA positive 2014    Anemia associated with chronic renal failure      on Epogen    Blood type B+     Bulging discs - symptomatic      CAD (coronary artery disease)     Diabetes mellitus, type 2     ESRD (end stage renal disease) 04/20/2004    FSGS (focal segmental glomerulosclerosis)      with collapsing    Hyperlipidemia     Hypertension 2004    Neuropathy     Obesity     Secondary hyperparathyroidism, renal     TIA (transient ischemic attack)     Uterine fibroid      small uterine      Past Medical History Pertinent Negatives   Diagnosis Date Noted    Cancer 5/6/2014    CHF (congestive heart failure) 5/6/2014    COPD (chronic obstructive pulmonary disease) 5/6/2014    Transfusion reaction 5/6/2014      Past Surgical History   Procedure Laterality Date    Peritoneal catheter insertion      Umbilical hernia repair       section, classic      Dialysis fistula creation       multiple fistulas and grafts before PD     Cardiac catheterization       PCI x 2    Incision and drainage of wound Left 2016     VULVAR ABCESS WITH NECROSIS     Family History   Problem Relation Age of Onset    Cancer Maternal Grandmother     Cancer Paternal Grandfather     Kidney disease Neg Hx     Heart disease Neg Hx     Diabetes Neg Hx     Ovarian cancer Neg Hx     Breast cancer Neg Hx      Social History   Substance Use Topics    Smoking status: Never Smoker    Smokeless tobacco: Never Used    Alcohol use No      Comment: Pt reports some social use of about 1-2 drinks about every six months.     Review of Systems   Constitutional: Positive for activity change and appetite change.   Eyes: Positive for visual disturbance. Negative for photophobia.   Respiratory: Negative for cough and shortness of breath.    Cardiovascular: Negative for chest pain.   Gastrointestinal: Positive for diarrhea and nausea. Negative for abdominal distention, abdominal pain and vomiting.   Musculoskeletal: Negative for myalgias, neck pain and neck stiffness.   Neurological: Positive for dizziness, light-headedness and headaches.       Physical Exam   Initial Vitals   BP Pulse Resp Temp SpO2   17 2135 17 2135 17 2135 17 2135 17   96/54 103 20 98.7 °F (37.1 °C) 100 %     Physical Exam    Nursing note and vitals reviewed.  Constitutional: She appears well-developed and well-nourished.   HENT:   Head: Normocephalic and atraumatic.   Dry mucosa.     Eyes: Conjunctivae and EOM are normal. Pupils are equal, round, and reactive to light. Right eye exhibits no discharge. Left eye exhibits no discharge. No scleral icterus.   Neck: Normal range of motion. Neck supple.   Cardiovascular: Normal rate, regular rhythm and  normal heart sounds. Exam reveals no gallop and no friction rub.    No murmur heard.  Pulmonary/Chest: Breath sounds normal. No stridor. No respiratory distress. She has no wheezes. She has no rhonchi. She has no rales.   Abdominal: Soft. Bowel sounds are normal. She exhibits no distension and no mass. There is no tenderness. There is no rebound and no guarding.   Dialysis catheter with clear liquid present no surrounding skin breakdown or erythema    Neurological: She is alert and oriented to person, place, and time. She has normal strength. No cranial nerve deficit or sensory deficit.   Skin: Skin is warm and dry.   Psychiatric: She has a normal mood and affect. Her behavior is normal. Judgment and thought content normal.         ED Course   Procedures  Labs Reviewed   CBC W/ AUTO DIFFERENTIAL - Abnormal; Notable for the following:        Result Value    RBC 3.11 (*)     Hemoglobin 10.7 (*)     Hematocrit 31.2 (*)      (*)     MCH 34.4 (*)     RDW 16.6 (*)     Platelets 358 (*)     All other components within normal limits   COMPREHENSIVE METABOLIC PANEL - Abnormal; Notable for the following:     Sodium 130 (*)     Chloride 86 (*)     CO2 20 (*)     Glucose 237 (*)     BUN, Bld 59 (*)     Creatinine 13.5 (*)     Total Protein 8.5 (*)     Albumin 3.2 (*)     Anion Gap 24 (*)     eGFR if  3 (*)     eGFR if non  3 (*)     All other components within normal limits   POCT GLUCOSE - Abnormal; Notable for the following:     POCT Glucose 251 (*)     All other components within normal limits   CULTURE, BLOOD   CULTURE, BLOOD   INFLUENZA A AND B ANTIGEN   LACTIC ACID, PLASMA   LACTIC ACID, PLASMA   LACTIC ACID, PLASMA             Medical Decision Making:   Initial Assessment:   Patient with hypotension, appears dehydrated, diarrhea, fatigue and headache.  Does not appear meningitic and has no neck stiffness/photophobia, exam does not reveal menigismus.  Will w/u for sepsis including  lactate, blood cultures, check for infectious causes including flu and pneumonia, will hydrate and treat headache and reassess.   Differential Diagnosis:   Viral syndrome, influenza, dehydration, meningitis, pneumonia, clostridium difficile infection, electrolyte abnormality, sinusitis   ED Management:  Patient improved with fluids and percocet but developed a low grade fever in the ED.  Family medicine resident consulted for observation in the hospital v/s close outpatient followup, will evaluate patient.                    ED Course     Clinical Impression:   The primary encounter diagnosis was Dehydration. Diagnoses of Viral syndrome, Headache, unspecified headache type, Diarrhea, unspecified type, Volume depletion, ESRD (end stage renal disease) on PD ( initiated dialysis 04/20/2004), FSGS (focal segmental glomerulosclerosis) biopsy proven with collapsing features, Anemia associated with chronic renal failure, Type 2 diabetes 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, unspecified long term insulin use status, and Dizziness were also pertinent to this visit.          Madelin Hummel MD  02/22/17 1500

## 2017-02-22 NOTE — ED NOTES
Patient identifiers for Jenni Todd checked and correct.  LOC: The patient is awake, alert and aware of environment with an appropriate affect, the patient is oriented x 3 and speaking appropriately.  APPEARANCE: Patient uncomfortable and in no acute distress.  SKIN: The skin is warm and dry, patient has normal skin turgor and moist mucus membranes. Dialysis access in right side of abdomen.  MUSKULOSKELETAL: Patient moving all extremities well, no obvious swelling or deformities noted.  RESPIRATORY: Airway is open and patent, respirations are spontaneous, patient has a normal effort and rate.  NEUROLOGIC: PERRL, facial expression is symmetrical, bilateral hand grasp equal and even, normal sensation in all extremities when touched with a finger.

## 2017-02-22 NOTE — DISCHARGE INSTRUCTIONS
Dehydration (Adult)  Dehydration occurs when your body loses too much fluid. This may be the result of prolonged vomiting or diarrhea, excessive sweating, or a high fever. It may also happen if you dont drink enough fluid when youre sick or out in the heat. Misuse of diuretics (water pills) can also be a cause.  Symptoms include thirst and decreased urine output. You may also feel dizzy, weak, fatigued, or very drowsy. The diet described below is usually enough to treat dehydration. In some cases, you may need medicine.  Home care  · Drink at least 12 8-ounce glasses of fluid every day to resolve the dehydration. Fluid may include water; orange juice; lemonade; apple, grape, or cranberry juice; clear fruit drinks; electrolyte replacement and sports drinks; and teas and coffee without caffeine. If you have been diagnosed with a kidney disease, ask your doctor how much and what types of fluids you should drink to prevent dehydration. If you have kidney disease, fluid can build up in the body. This can be dangerous to your health.  · If you have a fever, muscle aches, or a headache as a result of a cold or flu, you may take acetaminophen or ibuprofen, unless another medicine was prescribed. If you have chronic liver or kidney disease, or have ever had a stomach ulcer or gastrointestinal bleeding, talk with your health care provider before using these medicines. Don't take aspirin if you are younger than 18 and have a fever. Aspirin raises the chance for severe liver injury.  Follow-up care  Follow up with your health care provider, or as advised.  When to seek medical advice  Call your health care provider right away if any of these occur:  · Continued vomiting  · Frequent diarrhea (more than 5 times a day); blood (red or black color) or mucus in diarrhea  · Blood in vomit or stool  · Swollen abdomen or increasing abdominal pain  · Weakness, dizziness, or fainting  · Unusual drowsiness or confusion  · Reduced urine  output or extreme thirst  · Fever of 100.4°F (34°C) or higher  Date Last Reviewed: 5/31/2015  © 6305-4956 [employer]. [address] [place] [address], [place] [address]. All rights reserved. This information is not intended as a substitute for professional medical care. Always follow your healthcare professional's instructions.

## 2017-02-22 NOTE — CONSULTS
NEPHROLOGY CONSULT NOTE    HPI & INTERVAL HISTORY:    Past Medical History   Diagnosis Date    Abnormal finding on Pap smear, HPV DNA positive     Anemia associated with chronic renal failure      on Epogen    Blood type B+     Bulging discs - symptomatic      CAD (coronary artery disease)     Diabetes mellitus, type 2     ESRD (end stage renal disease) 2004    FSGS (focal segmental glomerulosclerosis)      with collapsing    Hyperlipidemia     Hypertension     Neuropathy     Obesity     Secondary hyperparathyroidism, renal     TIA (transient ischemic attack)     Uterine fibroid      small uterine       Past Surgical History   Procedure Laterality Date    Peritoneal catheter insertion      Umbilical hernia repair       section, classic      Dialysis fistula creation       multiple fistulas and grafts before PD     Cardiac catheterization       PCI x 2    Incision and drainage of wound Left 2016     VULVAR ABCESS WITH NECROSIS      Review of patient's allergies indicates:   Allergen Reactions    Clindamycin Diarrhea    Flagyl [metronidazole hcl]         (Not in a hospital admission)    Social History     Social History    Marital status: Single     Spouse name: N/A    Number of children: N/A    Years of education: N/A     Occupational History     [employer]     Social History Main Topics    Smoking status: Never Smoker    Smokeless tobacco: Never Used    Alcohol use No      Comment: Pt reports some social use of about 1-2 drinks about every six months.    Drug use: No    Sexual activity: Not Currently     Partners: Male     Birth control/ protection: None     Other Topics Concern    Not on file     Social History Narrative     [place]    Single    One child    History of blood transfusion in     Potential donor ??? brother        MEDS   amlodipine  10 mg Oral Daily    aspirin  81 mg Oral Daily    calcitRIOL  0.5 mcg Oral Daily     cinacalcet  90 mg Oral Daily    clopidogrel  75 mg Oral Daily    lanthanum  1,000 mg Oral TID WM    vitamin renal formula (B-complex-vitamin c-folic acid)  1 capsule Oral Daily             CONTINOUS INFUSIONS:    No intake or output data in the 24 hours ending 02/22/17 1212     HEMODYNAMICS:    Temp:  [98.3 °F (36.8 °C)-100.4 °F (38 °C)] 98.3 °F (36.8 °C)  Pulse:  [] 93  Resp:  [15-20] 19  SpO2:  [96 %-100 %] 100 %  BP: ()/() 119/72   C/o headache 5 days.  Diarrhea - 2 times.  No fever, no chills, no nausea, no vomiting.  No chest pain, no shortness of breath, no cough.  Gen: NAD.  Cards: Pulse 73.  Pul:diminished breath sounds.  Abdomen soft.  PD catheter in place.  Ext: no edema .  Skin: dry.  Anuric.  LABS   Lab Results   Component Value Date    WBC 7.19 02/22/2017    HGB 10.9 (L) 02/22/2017    HCT 32.2 (L) 02/22/2017     (H) 02/22/2017     02/22/2017          Recent Labs  Lab 02/22/17  0651   *   CALCIUM 8.8   ALBUMIN 3.2*   PROT 8.1   *   K 4.1   CO2 19*   CL 89*   BUN 59*   CREATININE 13.4*   ALKPHOS 117   ALT 14   AST 12   BILITOT 0.4      Lab Results   Component Value Date    .7 (H) 04/19/2016    CALCIUM 8.8 02/22/2017    PHOS 9.0 (HH) 02/22/2017      Lab Results   Component Value Date    IRON 41 04/16/2016    TIBC 147 (L) 04/16/2016    FERRITIN 1920 (H) 04/16/2016        ABG  No results for input(s): PH, PO2, PCO2, HCO3, BE in the last 168 hours.      IMAGING:  CXR    ASSESSMENT / PLAN  ESRD.  On peritoneal dialysis approximately 12 years.  Prior was on HD for 1 year.  Anuric.   lbs.  Hyponatremia.  Metabolic acidosis.  Metabolic bone disease.  Secondary hyperparathyroidism.  Hyperphosphatemia. Phosphorus 9.  Poor nutrition. Albumin 3.2.  Anemia. Hb 10.9.  Blood pressure 119/72.  Weight 96.2 kg.  CXR AP portable upright view of the chest. Monitoring leads overlie the chest. No detrimental change. Left basilar platelike scarring versus  atelectasis. No large consolidation or new focal opacity. No large pleural effusion or pneumothorax.  Calcific plaque of the aortic arch. The heart and mediastinal contours are within normal limits for age. No acute osseous process seen. PA and lateral views can be obtained.      Impression No detrimental change or radiographic acute intrathoracic process seen on this single view.    Peritoneal dialysis.  Volume 2500 cc - 4 exchanges, last fill - 2000 cc.   9 hours.   Dwell time 1 hour 40 min.    Weight daily.  Renal , ADA diet.

## 2017-02-22 NOTE — ED NOTES
Eyes closed.  Awakes with verbal stimuli.  Respirations even and unlabored.  Family member at bedside.

## 2017-02-22 NOTE — H&P
[facility]  History & Physical     SUBJECTIVE:      Reason for admission: dehyrdation     History of Present Illness:  47 yo AAF with pmh of ESRD (PD daily), CAD, HTN presents with Headache, diarrhea and malaise x 4 days. Pt describes headache as located over frontal aspect of head, aching, 4/10, similar to previous headaches other than duration (lasting since Friday) and only slighlty made better with OTC and percocet (received in ED). Headache is accompanied by photophobia but she denies stiff neck, vision changes, changes to sensation, muscle weakness, numbness or tingling, or vomiting.      Pt states she has had several days of nausea and watery diarrhea. She endorses decreased appetite and decreased PO intact.      Patient was seen and evaluated and was set to be discharged.  However, when she got up to ambulate she stated that she was very dizzy and unstable on her feet.  Orthostatics were obtained and were negative.         HOME MEDICATION  amlodipine (NORVASC) 10 MG tablet 10 mg once daily.    aspirin (ECOTRIN) 81 MG EC tablet Take 1 tablet (81 mg total) by mouth once daily.   atorvastatin (LIPITOR) 40 MG tablet Take 1 tablet (40 mg total) by mouth every evening.   calcitRIOL (ROCALTROL) 0.5 MCG Cap Take 1 capsule by mouth once daily. 1 Capsule Oral Every day   cinacalcet (SENSIPAR) 90 MG Tab Take 1 tablet by mouth once daily. 1 tablet Oral Every day   gabapentin (NEURONTIN) 300 MG capsule TAKE 1 BY MOUTH EVERY MORN-ING AND 2 BY MOUTH EVERY   EVENING   HUMULIN R 100 unit/mL injection    lanthanum (FOSRENOL) 1000 MG chewable tablet Take 1,000 mg by mouth 3 (three) times daily with meals.     nateglinide (STARLIX) 120 MG tablet Take 1 tablet by mouth before meals as needed. 1 Tablet Oral As directed.  STARLIX 120MG 30 MINUTES BEFORE BREAKFAST AND SUPPER.   PLAVIX 75 mg tablet TAKE 1 BY MOUTH DAILY   vitamin renal formula, B-complex-vitamin c-folic acid, (B COMPLEX-C-FOLIC ACID) 1 mg Cap  Take 1 capsule by mouth once daily. 1 Capsule Oral Every day             Review of patient's allergies indicates:   Allergen Reactions    Clindamycin Diarrhea    Flagyl [metronidazole hcl]                 Past Medical History   Diagnosis Date    Abnormal finding on Pap smear, HPV DNA positive     Anemia associated with chronic renal failure         on Epogen    Blood type B+      Bulging discs - symptomatic       CAD (coronary artery disease)      Diabetes mellitus, type 2      ESRD (end stage renal disease) 2004    FSGS (focal segmental glomerulosclerosis)         with collapsing    Hyperlipidemia      Hypertension     Neuropathy      Obesity      Secondary hyperparathyroidism, renal      TIA (transient ischemic attack)      Uterine fibroid         small uterine              Past Surgical History   Procedure Laterality Date    Peritoneal catheter insertion        Umbilical hernia repair         section, classic        Dialysis fistula creation           multiple fistulas and grafts before PD     Cardiac catheterization           PCI x 2    Incision and drainage of wound Left 2016       VULVAR ABCESS WITH NECROSIS            Family History   Problem Relation Age of Onset    Cancer Maternal Grandmother      Cancer Paternal Grandfather      Kidney disease Neg Hx      Heart disease Neg Hx      Diabetes Neg Hx      Ovarian cancer Neg Hx      Breast cancer Neg Hx               Social History   Substance Use Topics    Smoking status: Never Smoker    Smokeless tobacco: Never Used    Alcohol use No         Comment: Pt reports some social use of about 1-2 drinks about every six months.         Review of Systems:  Review of Systems   Constitutional: Negative for activity change, chills, fatigue and fever. Positive for decreased appetite.   HENT: Negative for congestion, rhinorrhea and sore throat.   Eyes: Negative for visual disturbance. Positive for photophobia.    Respiratory: Negative for cough, chest tightness and shortness of breath.   Cardiovascular: Negative for chest pain, palpitations and leg swelling.   Gastrointestinal: Negative for abdominal pain, constipation and vomiting.  Positive for diarrhea (watery x 4 days)  Genitourinary: Negative for dysuria and hematuria.   Musculoskeletal: Negative for arthralgias and myalgias.   Skin: Negative for rash.   Neurological: Negative for dizziness, light-headedness, loss of sensation, numbness or tingling or muscle weakness. Positive for headaches x 4 days.   Psychiatric/Behavioral: Negative for agitation.        OBJECTIVE:      Vital Signs (Most Recent):  Temp: 99.3 °F (37.4 °C) (02/22/17 0234)  Pulse: 96 (02/22/17 0220)  Resp: 15 (02/22/17 0220)  BP: 120/83 (02/22/17 0220)  SpO2: 100 % (02/22/17 0220)     Physical Exam:     Constitutional: Wnwd, nad, resting comfortably in bed    HENT:   Head: Normocephalic and atraumatic.   Mouth/Throat: Oropharynx is clear and moist.   Eyes: PERRLA, EOMI  Neck: Normal range of motion. Neck supple. No rigidity   Cardiovascular: Normal rate, regular rhythm, normal heart sounds and intact distal pulses.   Pulmonary/Chest: Effort normal and breath sounds normal.   Abdominal: Soft. Bowel sounds are normal. no distension. There is no tenderness.   Musculoskeletal: Normal range of motion. no edema, tenderness or deformity.   Neurological: AOx 4, Cranial nerves II-XII grossly intact, sensation grossly intact, finger nose finger wnl, heel shin wnl, 5/5 muscle strength upper and lower extremities.   Skin: Skin is warm and dry. not diaphoretic.   Psychiatric: normal mood and affect. behavior is normal. Judgment and thought content normal.   Nursing note and vitals reviewed.        Laboratory:  CBC:   Recent Labs  Lab 02/21/17  2304   WBC 7.93   RBC 3.11*   HGB 10.7*   HCT 31.2*   *   *   MCH 34.4*   MCHC 34.3      CMP:   Recent Labs  Lab 02/21/17  2304   *   CALCIUM 9.3    ALBUMIN 3.2*   PROT 8.5*   *   K 4.2   CO2 20*   CL 86*   BUN 59*   CREATININE 13.5*   ALKPHOS 122   ALT 16   AST 15   BILITOT 0.4      2/22: Blood cx x 2: pending        Diagnostic Results:  2/22:X-Ray Chest AP Portable  No detrimental change or radiographic acute intrathoracic process seen on this single view.     2/22: CT Head Without Contrast (Final result)  No acute large vascular territory infarct or intracranial hemorrhage definitively seen     ASSESSMENT/PLAN:   45 yo AAF with pmh of ESRD (PD daily), CAD, HTN presents with Headache, diarrhea and malaise x 4 days.  In Ed patient received IVF 1.5L and percocet. Pt reported dizziness with ambulation. will admit for dehydration.       #dehydration  -orthostatics negative   -ana hydration IVF NS @ 50ml/hr  -meclizine prn for dizziness    #ESRD (on home PD nightly)  -followed by Dr. Watt  -will consider consult in AM    #IDDM  -home insulin regime humulin 100 u QHS  -A1c 7.8 on 5/7/2014  -will rder A1c  -BG on admission 237  -Will give humulin 80QHS  -SSI  -POCT glucose    #CAD s/p PCI of RCA in 2012  -home medication: Plavix and Asa, will continue  -EF 55 (7/7/2015)    #HTN  -home medication amlodipine: will hold for now  -hypotensive on ED arrival   -Normotensive on admission    Plan: Will admit for observation to [facility].    Code: full  Diet: diabetic, renal, cardiac   Ppx: scds  Dispo: pending clinical improvement    Case discussed with staff, Dr. Tan.   Shelley Lombardi D.O.  [facility] HO-1  2/22/17

## 2017-02-22 NOTE — PROGRESS NOTES
Received report from Lennox Escamilla RN. RN assigned to patient will continue to care once patient arrives to the floor.

## 2017-02-22 NOTE — PLAN OF CARE
Problem: Kidney Disease, Chronic/End Stage Renal Disease (Adult)  Goal: Signs and Symptoms of Listed Potential Problems Will be Absent, Minimized or Managed (Kidney Disease, Chronic/End Stage Renal Disease)  Signs and symptoms of listed potential problems will be absent, minimized or managed by discharge/transition of care (reference Kidney Disease, Chronic/End Stage Renal Disease (Adult) CPG).  Outcome: Ongoing (interventions implemented as appropriate)    02/22/17 1615   Kidney Disease, Chronic/End Stage Renal Disease   Problems Assessed (Chronic Kidney Disease/ESRD) all   Problems Present (Chronic Kidney Disease/ESRD) electrolyte imbalance;fluid imbalance   CCPD x 9h today. POC reviewed with pt.

## 2017-02-22 NOTE — DISCHARGE SUMMARY
[facility]  Discharge Summary     Patient ID:  Jenni Todd  [id number]  46 y.o.  1970    Admit date: 2/21/2017    Discharge Date and Time:  02/24/2017 12:09 PM    Admitting Physician: Michael Tan III, MD     Discharge Provider: London Howell    Reason for Admission: Dehydration [E86.0], TIA    Admission Condition: stable    Procedures Performed: * No surgery found *    Hospital Course: PT presented to ED with headache, fatigue, and diarrhea x5 days associated with decreased appetite, poor intake, headache and dizziness with standing up.  Dry mucus membranes on physical exam with abnormal gait. CT-head showed evolution of prior L PCA infarct.  BUN/Cr 59/13.5, was hyponatremic at 130.  Was given IV fluids, PD dialyzed over night, and neurology due to CT head with evolution infarct and patient's abnormal gait as well as headache.  Patient's neurology work up was unremarkable with RPR, TSH, HIV, B-12, Folate, and MRI all negative.  Patient's headache improved with Fioricet and her headache, dizziness and abnormal gait resolved.  Patient is to follow up with PCP        Final Diagnoses:    Principal Problem: TIA (transient ischemic attack)    Discharged Condition: stable      Disposition: Home or Self Care    Follow Up/Patient Instructions:     Medications:  Reconciled Home Medications:   Current Discharge Medication List      START taking these medications    Details   oxycodone-acetaminophen (PERCOCET) 5-325 mg per tablet Take 1 tablet by mouth every 6 (six) hours as needed for Pain.  Qty: 10 tablet, Refills: 0         CONTINUE these medications which have CHANGED    Details   atorvastatin (LIPITOR) 40 MG tablet Take 1 tablet (40 mg total) by mouth every evening.  Qty: 90 tablet, Refills: 1    Associated Diagnoses: Cerebral infarction due to thrombosis of cerebral artery      !! clopidogrel (PLAVIX) 75 mg tablet Take 1 tablet (75 mg total) by mouth once daily.  Qty: 910 tablet, Refills: 1        !! - Potential duplicate medications found. Please discuss with provider.      CONTINUE these medications which have NOT CHANGED    Details   amlodipine (NORVASC) 10 MG tablet 10 mg once daily.       aspirin (ECOTRIN) 81 MG EC tablet Take 1 tablet (81 mg total) by mouth once daily.  Qty: 30 tablet, Refills: 12      calcitRIOL (ROCALTROL) 0.5 MCG Cap Take 1 capsule by mouth once daily. 1 Capsule Oral Every day      cinacalcet (SENSIPAR) 90 MG Tab Take 1 tablet by mouth once daily. 1 tablet Oral Every day      gabapentin (NEURONTIN) 300 MG capsule TAKE 1 BY MOUTH EVERY MORN-ING AND 2 BY MOUTH EVERY   EVENING  Qty: 90 capsule, Refills: 0      HUMULIN R 100 unit/mL injection       lanthanum (FOSRENOL) 1000 MG chewable tablet Take 1,000 mg by mouth 3 (three) times daily with meals.        nateglinide (STARLIX) 120 MG tablet Take 1 tablet by mouth before meals as needed. 1 Tablet Oral As directed.  STARLIX 120MG 30 MINUTES BEFORE BREAKFAST AND SUPPER.      !! PLAVIX 75 mg tablet TAKE 1 BY MOUTH DAILY  Qty: 90 tablet, Refills: 1    Associated Diagnoses: Cerebral infarction due to thrombosis of cerebral artery      vitamin renal formula, B-complex-vitamin c-folic acid, (B COMPLEX-C-FOLIC ACID) 1 mg Cap Take 1 capsule by mouth once daily. 1 Capsule Oral Every day       !! - Potential duplicate medications found. Please discuss with provider.          Discharge Procedure Orders  Diet general     Activity as tolerated     Call MD for:  severe persistent headache       Follow-up Information     Follow up with [facility] On 3/2/2017.    Specialty:  Family Medicine    Why:  @10:40am    Contact information:    [address], [address]  [facility] [address] [phone]              Signed:  London Howell  2/24/2017  12:09 PM

## 2017-02-22 NOTE — CONSULTS
[facility]  History & Physical    SUBJECTIVE:     Chief Complaint: Headache    History of Present Illness:  47 yo AAF with pmh of ESRD (PD daily), CAD, HTN presents with Headache, diarrhea and malaise x 4 days. Pt describes headache as located over frontal aspect of head, aching, 4/10,  similar to previous headaches other than duration (lasting since Friday) and only slighlty made better with OTC and percocet (received in ED).  Headache is accompanied by photophobia but she denies stiff neck, vision changes, changes to sensation, muscle weakness, numbness or tingling, or vomiting.      Pt states she has had several days of nausea and watery diarrhea.  She endorses decreased appetite and decreased PO intact.     Pt has no other complaints today and denies dizziness, chest pain, sob,  vomiting,  constipation, myalgia, arthralgia, dysuria, frequency.        (Not in a hospital admission)    Review of patient's allergies indicates:   Allergen Reactions    Clindamycin Diarrhea    Flagyl [metronidazole hcl]        Past Medical History   Diagnosis Date    Abnormal finding on Pap smear, HPV DNA positive     Anemia associated with chronic renal failure      on Epogen    Blood type B+     Bulging discs - symptomatic      CAD (coronary artery disease)     Diabetes mellitus, type 2     ESRD (end stage renal disease) 2004    FSGS (focal segmental glomerulosclerosis)      with collapsing    Hyperlipidemia     Hypertension 2004    Neuropathy     Obesity     Secondary hyperparathyroidism, renal     TIA (transient ischemic attack)     Uterine fibroid      small uterine      Past Surgical History   Procedure Laterality Date    Peritoneal catheter insertion      Umbilical hernia repair       section, classic      Dialysis fistula creation       multiple fistulas and grafts before PD     Cardiac catheterization       PCI x 2    Incision and drainage of wound Left       VULVAR ABCESS WITH NECROSIS     Family History   Problem Relation Age of Onset    Cancer Maternal Grandmother     Cancer Paternal Grandfather     Kidney disease Neg Hx     Heart disease Neg Hx     Diabetes Neg Hx     Ovarian cancer Neg Hx     Breast cancer Neg Hx      Social History   Substance Use Topics    Smoking status: Never Smoker    Smokeless tobacco: Never Used    Alcohol use No      Comment: Pt reports some social use of about 1-2 drinks about every six months.        Review of Systems:  Review of Systems   Constitutional: Negative for activity change, chills, fatigue and fever. Positive for decreased appetite.   HENT: Negative for congestion, rhinorrhea and sore throat.    Eyes: Negative for visual disturbance. Positive for photophobia.   Respiratory: Negative for cough, chest tightness and shortness of breath.    Cardiovascular: Negative for chest pain, palpitations and leg swelling.   Gastrointestinal: Negative for abdominal pain, constipation and vomiting.  Positive for diarrhea (watery x 4 days)  Genitourinary: Negative for dysuria and hematuria.   Musculoskeletal: Negative for arthralgias and myalgias.   Skin: Negative for rash.   Neurological: Negative for dizziness, light-headedness, loss of sensation, numbness or tingling or muscle weakness.  Positive for headaches x 4 days.   Psychiatric/Behavioral:  Negative for agitation.      OBJECTIVE:     Vital Signs (Most Recent):  Temp: 99.3 °F (37.4 °C) (02/22/17 0234)  Pulse: 96 (02/22/17 0220)  Resp: 15 (02/22/17 0220)  BP: 120/83 (02/22/17 0220)  SpO2: 100 % (02/22/17 0220)    Physical Exam:    Constitutional:  Wnwd, nad, resting comfortably in bed    HENT:   Head: Normocephalic and atraumatic.   Mouth/Throat: Oropharynx is clear and moist.   Eyes: PERRLA, EOMI  Neck: Normal range of motion. Neck supple. No rigidity   Cardiovascular: Normal rate, regular rhythm, normal heart sounds and intact distal pulses.    Pulmonary/Chest: Effort normal  and breath sounds normal.   Abdominal: Soft. Bowel sounds are normal. no distension. There is no tenderness.   Musculoskeletal: Normal range of motion.  no edema, tenderness or deformity.   Neurological:  AOx 4, Cranial nerves II-XII grossly intact, sensation grossly intact, finger nose finger wnl, heel shin wnl, 5/5 muscle strength upper and lower extremities.   Skin: Skin is warm and dry. not diaphoretic.   Psychiatric:  normal mood and affect.  behavior is normal. Judgment and thought content normal.   Nursing note and vitals reviewed.      Laboratory:  CBC:   Recent Labs  Lab 02/21/17  2304   WBC 7.93   RBC 3.11*   HGB 10.7*   HCT 31.2*   *   *   MCH 34.4*   MCHC 34.3     CMP:   Recent Labs  Lab 02/21/17  2304   *   CALCIUM 9.3   ALBUMIN 3.2*   PROT 8.5*   *   K 4.2   CO2 20*   CL 86*   BUN 59*   CREATININE 13.5*   ALKPHOS 122   ALT 16   AST 15   BILITOT 0.4     2/22: Blood cx x 2: pending      Diagnostic Results:  2/22:X-Ray Chest AP Portable  No detrimental change or radiographic acute intrathoracic process seen on this single view.    2/22: CT Head Without Contrast (Final result)  No acute large vascular territory infarct or intracranial hemorrhage definitively seen    ASSESSMENT/PLAN:   45 yo AAF with pmh of ESRD (PD daily), CAD, HTN presents with Headache, diarrhea and malaise x 4 days.  In Ed patient received IVF and percocet.  Consulted for evaluation for hospital observation on close follow up in clinic.     #Headache:  -At time of exam, patient stated improvement to headache. No focal neurologic deficits, no stiff neck or signs of infection.    -CXR: no acute process  -CT head: no acute process  -WBC, lactate wnl  -Tmax  100.4  -BP: intitially hypotensive but responded well to IVF    Plan:   -Pt is medically stable for discharge from ED.   -Pt will follow up in clinic later today (2/22/17 at 3:40pm)  -Will plan to follow up blood cx    Shelley Lombardi D.O.  [facility]  HO-1  02/22/2017

## 2017-02-23 PROBLEM — I63.9 CVA (CEREBRAL VASCULAR ACCIDENT): Status: ACTIVE | Noted: 2017-02-23

## 2017-02-23 LAB
ALBUMIN SERPL BCP-MCNC: 2.7 G/DL
ALP SERPL-CCNC: 135 U/L
ALT SERPL W/O P-5'-P-CCNC: 14 U/L
ANION GAP SERPL CALC-SCNC: 20 MMOL/L
AST SERPL-CCNC: 12 U/L
BASOPHILS # BLD AUTO: 0.04 K/UL
BASOPHILS NFR BLD: 0.6 %
BILIRUB SERPL-MCNC: 0.3 MG/DL
BUN SERPL-MCNC: 60 MG/DL
CALCIUM SERPL-MCNC: 7.7 MG/DL
CHLORIDE SERPL-SCNC: 91 MMOL/L
CO2 SERPL-SCNC: 18 MMOL/L
CREAT SERPL-MCNC: 13 MG/DL
DIFFERENTIAL METHOD: ABNORMAL
EOSINOPHIL # BLD AUTO: 0.3 K/UL
EOSINOPHIL NFR BLD: 3.9 %
ERYTHROCYTE [DISTWIDTH] IN BLOOD BY AUTOMATED COUNT: 16.7 %
EST. GFR  (AFRICAN AMERICAN): 3 ML/MIN/1.73 M^2
EST. GFR  (NON AFRICAN AMERICAN): 3 ML/MIN/1.73 M^2
GLUCOSE SERPL-MCNC: 199 MG/DL
HBV CORE AB SERPL QL IA: NEGATIVE
HBV SURFACE AB SER-ACNC: NEGATIVE M[IU]/ML
HBV SURFACE AG SERPL QL IA: NEGATIVE
HBV SURFACE AG SERPL QL IA: NEGATIVE
HCT VFR BLD AUTO: 26.5 %
HGB BLD-MCNC: 9 G/DL
LYMPHOCYTES # BLD AUTO: 2.9 K/UL
LYMPHOCYTES NFR BLD: 44.1 %
MAGNESIUM SERPL-MCNC: 1.5 MG/DL
MCH RBC QN AUTO: 34.1 PG
MCHC RBC AUTO-ENTMCNC: 34 %
MCV RBC AUTO: 100 FL
MONOCYTES # BLD AUTO: 0.7 K/UL
MONOCYTES NFR BLD: 11.1 %
NEUTROPHILS # BLD AUTO: 2.7 K/UL
NEUTROPHILS NFR BLD: 40.1 %
PHOSPHATE SERPL-MCNC: 7.3 MG/DL
PLATELET # BLD AUTO: 339 K/UL
PMV BLD AUTO: 10.5 FL
POCT GLUCOSE: 211 MG/DL (ref 70–110)
POCT GLUCOSE: 214 MG/DL (ref 70–110)
POCT GLUCOSE: 238 MG/DL (ref 70–110)
POCT GLUCOSE: 312 MG/DL (ref 70–110)
POCT GLUCOSE: 319 MG/DL (ref 70–110)
POTASSIUM SERPL-SCNC: 3.9 MMOL/L
PROT SERPL-MCNC: 6.9 G/DL
RBC # BLD AUTO: 2.64 M/UL
SODIUM SERPL-SCNC: 129 MMOL/L
WBC # BLD AUTO: 6.64 K/UL

## 2017-02-23 PROCEDURE — 25000003 PHARM REV CODE 250: Performed by: STUDENT IN AN ORGANIZED HEALTH CARE EDUCATION/TRAINING PROGRAM

## 2017-02-23 PROCEDURE — 36415 COLL VENOUS BLD VENIPUNCTURE: CPT

## 2017-02-23 PROCEDURE — 25000003 PHARM REV CODE 250: Performed by: FAMILY MEDICINE

## 2017-02-23 PROCEDURE — 83735 ASSAY OF MAGNESIUM: CPT

## 2017-02-23 PROCEDURE — G8978 MOBILITY CURRENT STATUS: HCPCS | Mod: CI

## 2017-02-23 PROCEDURE — 85025 COMPLETE CBC W/AUTO DIFF WBC: CPT

## 2017-02-23 PROCEDURE — 84100 ASSAY OF PHOSPHORUS: CPT

## 2017-02-23 PROCEDURE — 11000001 HC ACUTE MED/SURG PRIVATE ROOM

## 2017-02-23 PROCEDURE — G8979 MOBILITY GOAL STATUS: HCPCS | Mod: CH

## 2017-02-23 PROCEDURE — 97161 PT EVAL LOW COMPLEX 20 MIN: CPT

## 2017-02-23 PROCEDURE — 90945 DIALYSIS ONE EVALUATION: CPT

## 2017-02-23 PROCEDURE — 80053 COMPREHEN METABOLIC PANEL: CPT

## 2017-02-23 PROCEDURE — 96374 THER/PROPH/DIAG INJ IV PUSH: CPT

## 2017-02-23 PROCEDURE — 63600175 PHARM REV CODE 636 W HCPCS

## 2017-02-23 RX ORDER — BUTALBITAL, ACETAMINOPHEN AND CAFFEINE 50; 325; 40 MG/1; MG/1; MG/1
1 TABLET ORAL ONCE AS NEEDED
Status: ACTIVE | OUTPATIENT
Start: 2017-02-23 | End: 2017-02-23

## 2017-02-23 RX ADMIN — INSULIN ASPART 4 UNITS: 100 INJECTION, SOLUTION INTRAVENOUS; SUBCUTANEOUS at 05:02

## 2017-02-23 RX ADMIN — ACETAMINOPHEN 650 MG: 325 TABLET ORAL at 06:02

## 2017-02-23 RX ADMIN — LANTHANUM CARBONATE 1000 MG: 500 TABLET, CHEWABLE ORAL at 05:02

## 2017-02-23 RX ADMIN — INSULIN ASPART 4 UNITS: 100 INJECTION, SOLUTION INTRAVENOUS; SUBCUTANEOUS at 06:02

## 2017-02-23 RX ADMIN — INSULIN DETEMIR 35 UNITS: 100 INJECTION, SOLUTION SUBCUTANEOUS at 10:02

## 2017-02-23 RX ADMIN — ACETAMINOPHEN 650 MG: 325 TABLET ORAL at 05:02

## 2017-02-23 RX ADMIN — LANTHANUM CARBONATE 1000 MG: 500 TABLET, CHEWABLE ORAL at 10:02

## 2017-02-23 RX ADMIN — INSULIN ASPART 4 UNITS: 100 INJECTION, SOLUTION INTRAVENOUS; SUBCUTANEOUS at 10:02

## 2017-02-23 RX ADMIN — CINACALCET HYDROCHLORIDE 90 MG: 30 TABLET, COATED ORAL at 09:02

## 2017-02-23 RX ADMIN — ASCORBIC ACID, FOLIC ACID, NIACIN, THIAMINE, RIBOFLAVIN, PYRIDOXINE, CYANOCOBALAMIN, PANTOTHENIC ACID, BIOTIN 1 CAPSULE: 100; 1.5; 1.7; 20; 10; 1; 6; 150; 5 CAPSULE, LIQUID FILLED ORAL at 09:02

## 2017-02-23 RX ADMIN — CLOPIDOGREL 75 MG: 75 TABLET, FILM COATED ORAL at 09:02

## 2017-02-23 RX ADMIN — AMLODIPINE BESYLATE 10 MG: 5 TABLET ORAL at 09:02

## 2017-02-23 RX ADMIN — LANTHANUM CARBONATE 1000 MG: 500 TABLET, CHEWABLE ORAL at 12:02

## 2017-02-23 RX ADMIN — CALCITRIOL 0.5 MCG: 0.25 CAPSULE, LIQUID FILLED ORAL at 09:02

## 2017-02-23 RX ADMIN — ASPIRIN 81 MG: 81 TABLET, COATED ORAL at 09:02

## 2017-02-23 NOTE — CONSULTS
[facility] Consult Note     Reason for Consult -   Ischemic infarct     History of Present Illness -   Ms. Todd is a 47 y/o F w/ a PMHx of ESRD on PD, Type 2 DM on insulin, CAD, previous CVA and HTN.   Pt described a headache as located over the frontal aspect of her head that is aching and 4/10 in severity. It is similar to previous headaches other than duration as it has occurred for the past 5-6 days. She has photophobia but no neck stiffness, vision changes,. She also endorses several days of nausea and watery diarrhea with decreased PO tolerance. Initial plan was for discharge; however, upon standing she became dizzy and unstable on her feet.     Per chart review outpatient medications include Lipitor 40 mg, ASA 81 mg, and Plavix.     Review of Systems -  As above        Past Medical History - as above  Social History -   Family History -   Allergies - NKDA     No current facility-administered medications on file prior to encounter.              Current Outpatient Prescriptions on File Prior to Encounter   Medication Sig Dispense Refill    amlodipine (NORVASC) 10 MG tablet 10 mg once daily.         aspirin (ECOTRIN) 81 MG EC tablet Take 1 tablet (81 mg total) by mouth once daily. 30 tablet 12    atorvastatin (LIPITOR) 40 MG tablet Take 1 tablet (40 mg total) by mouth every evening. 90 tablet 3    calcitRIOL (ROCALTROL) 0.5 MCG Cap Take 1 capsule by mouth once daily. 1 Capsule Oral Every day        cinacalcet (SENSIPAR) 90 MG Tab Take 1 tablet by mouth once daily. 1 tablet Oral Every day        gabapentin (NEURONTIN) 300 MG capsule TAKE 1 BY MOUTH EVERY MORN-ING AND 2 BY MOUTH EVERY EVENING 90 capsule 0    HUMULIN R 100 unit/mL injection          lanthanum (FOSRENOL) 1000 MG chewable tablet Take 1,000 mg by mouth 3 (three) times daily with meals.         nateglinide (STARLIX) 120 MG tablet Take 1 tablet by mouth before meals as needed. 1 Tablet Oral As directed. STARLIX 120MG 30 MINUTES BEFORE  BREAKFAST AND SUPPER.        PLAVIX 75 mg tablet TAKE 1 BY MOUTH DAILY 90 tablet 1    vitamin renal formula, B-complex-vitamin c-folic acid, (B COMPLEX-C-FOLIC ACID) 1 mg Cap Take 1 capsule by mouth once daily. 1 Capsule Oral Every day                Vitals -       Vitals           Vitals:     02/22/17 2015 02/23/17 0006 02/23/17 0505 02/23/17 0729   BP: (!) 86/61 (!) 109/54 (!) 106/49 129/60   BP Location: Right leg Left leg Left leg     Patient Position: Lying Lying Lying Lying   BP Method: Automatic Automatic Automatic Automatic   Pulse: 86 83 83 80   Resp: 16 16 16 17   Temp: 99.3 °F (37.4 °C) 99.2 °F (37.3 °C) 99 °F (37.2 °C) 99.1 °F (37.3 °C)   TempSrc: Oral Oral Oral Oral   SpO2:           Weight:           Height:                    Neurological Exam -          Orientation:   Oriented to time, place, person and situation      Language: Spontaneous, fluent; able to repeat and name objects       Cranial nerves: PERRL, EOMI, facial sensation intact, face symmetrical, hearing grossly intact, palate raises midline, shoulder shrug strength normal, tongue protrudes midline.     Musculoskeletal:  Muscle strength:   5/5 in bilateral upper extremities  4/5 in the bilateral lower extremities  No pronator drift  Sensation: Intact to light touch, pin prick, vibration in all extremities    Deep tendon Reflexes: 2+ bilateral triceps, biceps, and brachioradialis; 2+ in bilateral patellae. Positive ankle jerks.  Plantar flexor responses bilaterally     Cerebellar and Coordination:  Gait: deferred   Finger-to-nose and heel-to-shin: no dysmetria      Labs -        Recent Labs  Lab 02/21/17  2304 02/22/17  0651 02/23/17  0556   WBC 7.93 7.19 6.64   HGB 10.7* 10.9* 9.0*   HCT 31.2* 32.2* 26.5*   * 343 339   * 130* 129*   K 4.2 4.1 3.9   CL 86* 89* 91*   CO2 20* 19* 18*   BUN 59* 59* 60*   * 171* 199*   PROT 8.5* 8.1 6.9   ALBUMIN 3.2* 3.2* 2.7*   BILITOT 0.4 0.4 0.3   AST 15 12 12   ALKPHOS 122 117 135    ALT 16 14 14         Imaging -   CT Head w/o contrast 2/21/17:  Study is somewhat limited by beam hardening with streak artifact, particularly at the skull base. No convincing evidence of hemorrhage, mass, midline shift or acute major vascular territory infarct. Interval progression of small area of encephalomalacia involving the left parietal lobe consistent with remote infarct.      Inferior left cerebellar small remote infarct again noted. No definite new focal areas of abnormal parenchymal attenuation. The ventricular system is normal in size for age and demonstrates no distortion by mass effect. Bilateral basal ganglia calcifications noted.  No extra-axial hemorrhage or mass. The extracranial structures are within normal limits.  No displaced calvarial fracture.  Partially imaged small retention cyst versus polyp at the right maxillary sinus. Remaining visualized portions of the paranasal sinuses and mastoid air cells are clear.     MRI Brain w/o contrast 7/13/15:  1.  Acute non-hemorrhagic watershed territory infarction in the left parietal occipital junction.    2.  The focal abnormality in the medial surface of the right cerebellum seen on the previous study is no longer visualized on the current study and is felt to have been artifactual finding.     Assessment and Plan -   -Given history of previous CVA and other risk factors, recommend stroke work up.  -MRI Brain from July 2015 shows previous area of infarction in the Left parieto-occipital area.  -CT Head: as above  -HgA1c: 9  Recommendations:  -TSH, Vit B12, Folate, HIV, RPR, Lipid panel  -Cont DAPT ASA 81 mg and Plavix at this time.  -Due to age of Ms. Todd being < 50. Recommend hypercoaguable work-up: PT/INR, APTT, D-dimer, Antithromibin III, Fibrinogen, Factor 5 Leiden, DRVVT, Prothrombin gene mutation, Homocysteine, Protein C, Protein S, Cardiolipin antibody, Cadiolipin antibody IgA, Lipoprotein A, lupus anticoagulant, and hemoglobin  electrophoresis. It is possible that being on dialysis chronically has contributed to her vasculopathies.  -MRI Brain w/o contrast, MRA Brain w/o contrast, TTE w/ bubble study  -For headache Fioricet PO TID for 3 doses.  -May consider compazine as well     Case was discussed w/ staff, Dr. Tillman.     Kevyn Clemons MD  [facility] PGY 2

## 2017-02-23 NOTE — PLAN OF CARE
Problem: Physical Therapy Goal  Goal: Physical Therapy Goal  Goals to be met by: 3/23/2017     Patient will increase functional independence with mobility by performin. Gait x >200 feet with Modified [place] using Single-point Cane .   Outcome: Ongoing (interventions implemented as appropriate)  Recommend Home  No DME needs noted

## 2017-02-23 NOTE — PT/OT/SLP EVAL
Physical Therapy  Evaluation    Jenni Todd   MRN: [id number]   Admitting Diagnosis: Dehydration    PT Received On: 17  PT Start Time: 1325     PT Stop Time: 1335    PT Total Time (min): 10 min       Billable Minutes:  Evaluation 10    Diagnosis: Dehydration  The primary encounter diagnosis was Dehydration. Diagnoses of Viral syndrome, Headache, unspecified headache type, Diarrhea, unspecified type, Volume depletion, ESRD (end stage renal disease) on PD ( initiated dialysis 2004), FSGS (focal segmental glomerulosclerosis) biopsy proven with collapsing features, Anemia associated with chronic renal failure, Type 2 diabetes 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, unspecified long term insulin use status, Dizziness, Neuropathy, and Cerebrovascular accident (CVA), unspecified mechanism were also pertinent to this visit.      Past Medical History   Diagnosis Date    Abnormal finding on Pap smear, HPV DNA positive     Anemia associated with chronic renal failure      on Epogen    Blood type B+     Bulging discs - symptomatic      CAD (coronary artery disease)     Diabetes mellitus, type 2     ESRD (end stage renal disease) 2004    FSGS (focal segmental glomerulosclerosis)      with collapsing    Hyperlipidemia     Hypertension     Neuropathy     Obesity     Secondary hyperparathyroidism, renal     TIA (transient ischemic attack)     Uterine fibroid      small uterine       Past Surgical History   Procedure Laterality Date    Peritoneal catheter insertion      Umbilical hernia repair       section, classic      Dialysis fistula creation       multiple fistulas and grafts before PD     Cardiac catheterization       PCI x 2    Incision and drainage of wound Left      VULVAR ABCESS WITH NECROSIS       Referring physician: Dominick  Date referred to PT: 2017    General Precautions: Standard, fall  Orthopedic Precautions: N/A   Braces: N/A             Patient History:  Lives With: alone  Living Arrangements: house  Stair Railings at Home: none  Transportation Available: car  Equipment Currently Used at Home: power chair, cane, straight, shower chair  DME owned (not currently used): none    Previous Level of Function:  Ambulation Skills: independent  Transfer Skills: independent  ADL Skills: independent  Work/Leisure Activity: independent    Subjective:  Communicated with primary nurse prior to session.    Chief Complaint: generalized pain back and legs  Patient goals: go home    Pain Rating: 3/10   Location - Side: Bilateral  Location - Orientation: generalized  Location: back     Pain Rating Post-Intervention: 3/10    Objective:         Cognitive Exam:  Oriented to: Person, Place, Time and Situation    Follows Commands/attention: Follows two-step commands  Communication: clear/fluent  Safety awareness/insight to disability: intact    Physical Exam:  Postural examination/scapula alignment: Rounded shoulder    Skin integrity: Visible skin intact  Edema: None noted either LE    Sensation:   Intact grossly    Upper Extremity Range of Motion:  Right Upper Extremity: WFL  Left Upper Extremity: WFL    Upper Extremity Strength:  Right Upper Extremity: WFL  Left Upper Extremity: WFL    Lower Extremity Range of Motion:  Right Lower Extremity: WFL  Left Lower Extremity: WFL    Lower Extremity Strength:  Right Lower Extremity: 4/5  Left Lower Extremity: 4/5     Fine motor coordination:  Intact    Gross motor coordination: WFL    Functional Mobility:  Bed Mobility:  Supine to Sit: Modified Independent  Sit to Supine: Modified Independent    Transfers:  Sit <> Stand Assistance: Supervision  Sit <> Stand Assistive Device: No Assistive Device    Gait:   Gait Distance: 20 in room   Assistance 1: Stand by Assistance  Gait Assistive Device: No device    Stairs:  na    Balance:   Static Sit: GOOD: Takes MODERATE challenges from all directions  Dynamic Sit: GOOD: Maintains  balance through MODERATE excursions of active trunk movement  Static Stand: FAIR+: Takes MINIMAL challenges from all directions  Dynamic stand: FAIR+: Needs CLOSE SUPERVISION during gait and is able to right self with minor LOB    Therapeutic Activities and Exercises:  na    AM-PAC 6 CLICK MOBILITY  How much help from another person does this patient currently need?   1 = Unable, Total/Dependent Assistance  2 = A lot, Maximum/Moderate Assistance  3 = A little, Minimum/Contact Guard/Supervision  4 = None, Modified [place]/Independent    Turning over in bed (including adjusting bedclothes, sheets and blankets)?: 4  Sitting down on and standing up from a chair with arms (e.g., wheelchair, bedside commode, etc.): 4  Moving from lying on back to sitting on the side of the bed?: 4  Moving to and from a bed to a chair (including a wheelchair)?: 4  Need to walk in hospital room?: 4  Climbing 3-5 steps with a railing?: 3  Total Score: 23     AM-PAC Raw Score CMS G-Code Modifier Level of Impairment Assistance   6 % Total / Unable   7 - 9 CM 80 - 100% Maximal Assist   10 - 14 CL 60 - 80% Moderate Assist   15 - 19 CK 40 - 60% Moderate Assist   20 - 22 CJ 20 - 40% Minimal Assist   23 CI 1-20% SBA / CGA   24 CH 0% Independent/ Mod I     Patient left seated EOB with call button in reach.    Assessment:   Jenni Todd is a 46 y.o. female with a medical diagnosis of Dehydration and presents with pain, and c/o generalized aching pain especially lower back. Patient walked in room but refused to walk out of room. Will need to check balance status and need for AD or not .    Rehab identified problem list/impairments: Rehab identified problem list/impairments: weakness, impaired balance    Rehab potential is good.    Activity tolerance: Fair    Discharge recommendations: Discharge Facility/Level Of Care Needs: home     Barriers to discharge: Barriers to Discharge: None    Equipment recommendations: Equipment Needed  After Discharge: none     GOALS:   Physical Therapy Goals        Problem: Physical Therapy Goal    Goal Priority Disciplines Outcome Goal Variances Interventions   Physical Therapy Goal     PT/OT, PT Ongoing (interventions implemented as appropriate)     Description:  Goals to be met by: 3/23/2017     Patient will increase functional independence with mobility by performin. Gait  x >200 feet with Modified [place] using Single-point Cane .                 PLAN:    Patient to be seen 5 x/week to address the above listed problems via gait training, therapeutic activities, therapeutic exercises  Plan of Care expires: 17  Plan of Care reviewed with: patient    Functional Assessment Tool Used: ampac  Score: 23  Functional Limitation: Mobility: Walking and moving around  Mobility: Walking and Moving Around Current Status (): CI  Mobility: Walking and Moving Around Goal Status (): KIMMIE Trejo, PT  2017

## 2017-02-23 NOTE — PLAN OF CARE
Problem: Kidney Disease, Chronic/End Stage Renal Disease (Adult)  Goal: Signs and Symptoms of Listed Potential Problems Will be Absent, Minimized or Managed (Kidney Disease, Chronic/End Stage Renal Disease)  Signs and symptoms of listed potential problems will be absent, minimized or managed by discharge/transition of care (reference Kidney Disease, Chronic/End Stage Renal Disease (Adult) CPG).   Outcome: Ongoing (interventions implemented as appropriate)    02/23/17 1542   Kidney Disease, Chronic/End Stage Renal Disease   Problems Assessed (Chronic Kidney Disease/ESRD) all   Problems Present (Chronic Kidney Disease/ESRD) electrolyte imbalance;fluid imbalance      CCPD setup per MD order.  Pt currently out of room for a procedure.

## 2017-02-23 NOTE — NURSING
Respiratory Therapy reports that they attempted to attain orthostatic vital signs.  Supine and standing were successful, but upon standing patient got dizzy and almost passed out.  Patient placed back in bed in Trendelenburg position.  Bed alarm set.  Dr. Moreau notified.

## 2017-02-23 NOTE — PROGRESS NOTES
Progress Note  Nephrology      Consult Requested By: Michael Tan III, MD      SUBJECTIVE:     Overnight events  Patient is a 46 y.o. female     Patient Active Problem List   Diagnosis    Neuropathy    ESRD (end stage renal disease) on PD ( initiated dialysis 04/20/2004)    FSGS (focal segmental glomerulosclerosis) biopsy proven with collapsing features    Anemia associated with chronic renal failure    Hypertension    Hyperlipidemia    Obesity    CAD (coronary artery disease) with recent PCI 12/18/2012    Diabetes mellitus, type 2    Awaiting organ transplant status    Bulging discs - symptomatic     Spinal stenosis of sacral and sacrococcygeal region    Hypertensive renal disease    Sacral back pain    Fall at home    Generalized muscle weakness    Cerebellar stroke    Hypertension associated with diabetes    Thrombocytosis    Hypoalbuminemia    Hypomagnesemia    Factor 5 Leiden mutation, heterozygous    Von Willebrand disease    Difficulty walking    Lumbar radiculopathy    Cerebral infarction due to embolism of middle cerebral artery    Candida albicans infection    Gait abnormality    Chronic low back pain    DDD (degenerative disc disease), lumbar    Vulvar abscess    Coronary artery disease involving native coronary artery of native heart without angina pectoris    Type 2 diabetes mellitus with diabetic chronic kidney disease    Essential hypertension    PAD (peripheral artery disease)    Volume depletion    Dehydration     Past Medical History   Diagnosis Date    Abnormal finding on Pap smear, HPV DNA positive 2014    Anemia associated with chronic renal failure      on Epogen    Blood type B+     Bulging discs - symptomatic      CAD (coronary artery disease)     Diabetes mellitus, type 2     ESRD (end stage renal disease) 04/20/2004    FSGS (focal segmental glomerulosclerosis)      with collapsing    Hyperlipidemia     Hypertension 2004    Neuropathy      Obesity     Secondary hyperparathyroidism, renal     TIA (transient ischemic attack)     Uterine fibroid      small uterine               OBJECTIVE:     Vitals:    02/22/17 2015 02/23/17 0006 02/23/17 0505 02/23/17 0729   BP: (!) 86/61 (!) 109/54 (!) 106/49 129/60   BP Location: Right leg Left leg Left leg    Patient Position: Lying Lying Lying Lying   BP Method: Automatic Automatic Automatic Automatic   Pulse: 86 83 83 80   Resp: 16 16 16 17   Temp: 99.3 °F (37.4 °C) 99.2 °F (37.3 °C) 99 °F (37.2 °C) 99.1 °F (37.3 °C)   TempSrc: Oral Oral Oral Oral   SpO2:       Weight:       Height:           Temp: 99.1 °F (37.3 °C) (02/23/17 0729)  Pulse: 80 (02/23/17 0729)  Resp: 17 (02/23/17 0729)  BP: 129/60 (02/23/17 0729)  SpO2: 100 % (02/22/17 1429)               Medications:   amlodipine  10 mg Oral Daily    aspirin  81 mg Oral Daily    calcitRIOL  0.5 mcg Oral Daily    cinacalcet  90 mg Oral Daily    clopidogrel  75 mg Oral Daily    lanthanum  1,000 mg Oral TID WM    magnesium sulfate IVPB  1 g Intravenous Once    vitamin renal formula (B-complex-vitamin c-folic acid)  1 capsule Oral Daily                  Physical Exam:  C/o headache  No  CP, no SOB, no cough  General appearance:NAD  Lungs: diminished breath sounds  Heart: Pulse 80  Abdomen: soft  Extremities: no edema  Skin: dry    Laboratory:  ABG  Labs reviewed  No results found for this or any previous visit (from the past 336 hour(s)).  Recent Results (from the past 336 hour(s))   CBC with Automated Differential    Collection Time: 02/23/17  5:56 AM   Result Value Ref Range    WBC 6.64 3.90 - 12.70 K/uL    Hemoglobin 9.0 (L) 12.0 - 16.0 g/dL    Hematocrit 26.5 (L) 37.0 - 48.5 %    Platelets 339 150 - 350 K/uL   CBC with Automated Differential    Collection Time: 02/22/17  6:51 AM   Result Value Ref Range    WBC 7.19 3.90 - 12.70 K/uL    Hemoglobin 10.9 (L) 12.0 - 16.0 g/dL    Hematocrit 32.2 (L) 37.0 - 48.5 %    Platelets 343 150 - 350 K/uL   CBC auto  differential    Collection Time: 02/21/17 11:04 PM   Result Value Ref Range    WBC 7.93 3.90 - 12.70 K/uL    Hemoglobin 10.7 (L) 12.0 - 16.0 g/dL    Hematocrit 31.2 (L) 37.0 - 48.5 %    Platelets 358 (H) 150 - 350 K/uL     Urinalysis  No results for input(s): COLORU, CLARITYU, SPECGRAV, PHUR, PROTEINUA, GLUCOSEU, BILIRUBINCON, BLOODU, WBCU, RBCU, BACTERIA, MUCUS, NITRITE, LEUKOCYTESUR, UROBILINOGEN, HYALINECASTS in the last 24 hours.    Diagnostic Results:  X-Ray: Reviewed  US: Reviewed  Echo: Reviewed  ACCESS    ASSESSMENT/PLAN:   ESRD.  Metabolic acidosis.  Hyponatremia.  Metabolic bone disease.  Hyperphosphatemia. Binders.  Hypomagnesemia.Replace.  Poor nutrition. Albumin 2.7.  Anemia. Epogen.  Blood pressure 129/60.  Peritoneal dialysis.  CT - No acute large vascular territory infarct or intracranial hemorrhage definitively seen. Further evaluation/followup as warranted.  Evolution of prior left PCA territory small remote infarct.  Left cerebellar small remote infarct.  MRI head.  Consult neurology.

## 2017-02-23 NOTE — PLAN OF CARE
Problem: Patient Care Overview  Goal: Plan of Care Review  Outcome: Ongoing (interventions implemented as appropriate)  Pt AA&O x 4. Pt complains of mild headache. Tylenol given. Respirations even and unlabored. No evidence of distress noted. Peritoneal Dialysis in use. Safety maintained. Bed in lowest position, side rails up x 3, call light and personal items within reach. Pt notified to call for assistance if needed. Pt verbalizes understanding. Will continue to monitor.

## 2017-02-23 NOTE — PLAN OF CARE
met with pt and father at bedside, pt  Independent with adl's had DME at home if needed.      pt set up [facility], nephrologist is Dr Watt     02/23/17 3070   Discharge Assessment   Assessment Type Discharge Planning Assessment   Confirmed/corrected address and phone number on facesheet? Yes   Assessment information obtained from? Patient   Expected Length of Stay (days) 2   Communicated expected length of stay with patient/caregiver yes   Prior to hospitilization cognitive status: Alert/Oriented   Prior to hospitalization functional status: Independent;Assistive Equipment  (pt has DME but normally does not require use of DME)   Current cognitive status: Alert/Oriented   Current Functional Status: Independent;Assistive Equipment   Arrived From home or self-care   Lives With child(crystal), dependent   Able to Return to Prior Arrangements yes   Is patient able to care for self after discharge? Yes   How many people do you have in your home that can help with your care after discharge? 2   Who are your caregiver(s) and their phone number(s)? father   Patient's perception of discharge disposition home or selfcare   Readmission Within The Last 30 Days no previous admission in last 30 days   Patient currently being followed by outpatient case management? No   Patient currently receives home health services? No   Does the patient currently use HME? Yes   Patient currently receives private duty nursing? N/A   Patient currently receives any other outside agency services? No   Equipment Currently Used at Home walker, rolling;rollator   Do you have any problems affording any of your prescribed medications? No   Is the patient taking medications as prescribed? yes   Do you have any financial concerns preventing you from receiving the healthcare you need? No   Does the patient have transportation to healthcare appointments? Yes   Transportation Available family or friend will provide;car   On Dialysis? Yes   If yes,  what is the name of the dialysis unit? pt with [facility] set up with PD   Does the patient receive outpatient dialysis? No   Does the patient receive services at the [facility]? No   Are there any open cases? No   Discharge Plan A Home with family   Discharge Plan B Home with family   Patient/Family In Agreement With Plan yes

## 2017-02-23 NOTE — PROGRESS NOTES
Progress Note  [facility]  Admit Date: 2/21/2017   LOS: 1 day   SUBJECTIVE:     Patient seen and examined this AM.  Reports feeling weak and having a headache.  Headache has been intermittent since almost 1 week ago, location is bilateral frontal area, intermittent from 3/10 to 9/10, pressure-like.  No previous history of headaches.  Denies photophobia or aura symptoms.  Reports having difficulty walking.      Review of Systems   Constitutional: Negative for chills and fever.   HENT: Negative for congestion and sore throat.    Eyes: Negative for blurred vision, double vision, photophobia and discharge.   Respiratory: Negative for cough, hemoptysis and shortness of breath.    Cardiovascular: Negative for chest pain and palpitations.   Gastrointestinal: Negative for nausea and vomiting.   Neurological: Positive for weakness and headaches. Negative for speech change and focal weakness.   Psychiatric/Behavioral: Negative for hallucinations and substance abuse.       OBJECTIVE:   Vital Signs (Most Recent)  Temp: 98.8 °F (37.1 °C) (02/23/17 1124)  Pulse: 78 (02/23/17 1124)  Resp: 18 (02/23/17 1124)  BP: 118/64 (02/23/17 1124)  SpO2: 100 % (02/22/17 1429)    I & O (Last 24H):  Intake/Output Summary (Last 24 hours) at 02/23/17 1502  Last data filed at 02/23/17 0505   Gross per 24 hour   Intake                0 ml   Output                0 ml   Net                0 ml     Wt Readings from Last 3 Encounters:   02/22/17 91.4 kg (201 lb 8 oz)   01/16/17 95.7 kg (211 lb)   11/03/16 97.6 kg (215 lb 2.7 oz)       Current Diet Order   Procedures    Diet Diabetic 1800 Calories Renal, Cardiac (Low Na/Chol)     Order Specific Question:   Additional restrictions:     Answer:   Renal     Order Specific Question:   Additional restrictions:     Answer:   Cardiac (Low Na/Chol)        Physical Exam   Constitutional: She is oriented to person, place, and time and well-developed, well-nourished, and in no distress.   HENT:   Head:  Normocephalic and atraumatic.   Neck: Normal range of motion. Neck supple.   Cardiovascular: Normal rate and regular rhythm.    Pulmonary/Chest: Effort normal. No respiratory distress. She has no wheezes.   Abdominal: Soft.   Musculoskeletal: Normal range of motion. She exhibits no edema.   Neurological: She is alert and oriented to person, place, and time. No cranial nerve deficit. Coordination abnormal.   Skin: Skin is warm.   Psychiatric: Affect and judgment normal.         Laboratory Data:  CBC    Recent Labs  Lab 02/21/17 2304 02/22/17  0651 02/23/17  0556   WBC 7.93 7.19 6.64   RBC 3.11* 3.20* 2.64*   HGB 10.7* 10.9* 9.0*   HCT 31.2* 32.2* 26.5*   * 343 339   * 101* 100*   MCH 34.4* 34.1* 34.1*   MCHC 34.3 33.9 34.0     CMP    Recent Labs  Lab 02/21/17 2304 02/22/17 0651 02/23/17  0556   CALCIUM 9.3 8.8 7.7*   PROT 8.5* 8.1 6.9   * 130* 129*   K 4.2 4.1 3.9   CO2 20* 19* 18*   CL 86* 89* 91*   BUN 59* 59* 60*   CREATININE 13.5* 13.4* 13.0*   ALKPHOS 122 117 135   ALT 16 14 14   AST 15 12 12   BILITOT 0.4 0.4 0.3     POCT-Glucose  POCT Glucose   Date Value Ref Range Status   02/22/2017 312 (H) 70 - 110 mg/dL Final   02/22/2017 191 (H) 70 - 110 mg/dL Final   02/22/2017 174 (H) 70 - 110 mg/dL Final   02/21/2017 251 (H) 70 - 110 mg/dL Final     COAGS  No results for input(s): INR, APTT in the last 168 hours.    Invalid input(s): PT  UA  No results for input(s): COLORU, CLARITYU, SPECGRAV, PHUR, PROTEINUA, GLUCOSEU, BLOODU, WBCU, RBCU, BACTERIA, MUCUS in the last 24 hours.    Invalid input(s):  BILIRUBINCON  MICRO  Microbiology Results (last 7 days)     Procedure Component Value Units Date/Time    Blood Culture #1 **CANNOT BE ORDERED STAT** [[id number]] Collected:  02/21/17 4469    Order Status:  Completed Specimen:  Blood from Peripheral, Lower Arm, Right Updated:  02/23/17 0613     Blood Culture, Routine No Growth to date     Blood Culture, Routine No Growth to date    Blood Culture #2  "**CANNOT BE ORDERED STAT** [[id number]] Collected:  02/21/17 2330    Order Status:  Completed Specimen:  Blood from Peripheral, Forearm, Left Updated:  02/23/17 0613     Blood Culture, Routine No Growth to date     Blood Culture, Routine No Growth to date        LIPID PANEL  Lab Results   Component Value Date    CHOL 124 07/06/2015     Lab Results   Component Value Date    HDL 25 (L) 07/06/2015     Lab Results   Component Value Date    LDLCALC 75.6 07/06/2015     Lab Results   Component Value Date    TRIG 117 07/06/2015     Lab Results   Component Value Date    CHOLHDL 20.2 07/06/2015         ASSESSMENT/PLAN:     45 yo AAF with pmh of ESRD (PD daily), CAD, HTN admitted for dehydration, had PD yesterday c/o headache with difficulty walking. CT head was positive for "evolution of prior L PCA infarct." Patient symptomatic for CVA so consulted neurology for CVA.         CVA  -patient reported weakness, headache and difficulty walking   -CT head positive for "evolution of prior L PCA infarct"  -consulted neurology, appreciate recs  -will do CVA work up  -f/u MRI, RPR, HIV, lipid panel, TSH, B-12, folate and heart Echo  -PT/OT    ESRD (on home PD nightly)  -Consulted nephro, appreciate recs  -Had PD yesterday  - K 3.9 and Cr 13 today     dehydration  -orthostatics negative  -patient still c/o headache which dehydration may be the reason for    -was given IV fluids   -meclizine prn for dizziness        DM  -A1c now 9.0, on 4/16 it was 8.2  -Will give levemir 35 U BID   -SSI  -POCT glucose     CAD s/p PCI of RCA in 2012  -home medication: Plavix and Asa, will continue  -EF 55 (7/7/2015)     HTN  -home medication amlodipine: will hold for now  -hypotensive on ED arrival   -Normotensive on admission          Code: full  Diet: diabetic, renal, cardiac   Ppx: scds  Dispo: f/u CVA work up, PT/OT, neuro recs     2/23/2017 London Howell MD  3:02 PM      "

## 2017-02-24 VITALS
RESPIRATION RATE: 17 BRPM | WEIGHT: 201.5 LBS | HEART RATE: 80 BPM | OXYGEN SATURATION: 96 % | TEMPERATURE: 98 F | BODY MASS INDEX: 31.63 KG/M2 | DIASTOLIC BLOOD PRESSURE: 86 MMHG | HEIGHT: 67 IN | SYSTOLIC BLOOD PRESSURE: 173 MMHG

## 2017-02-24 PROBLEM — G45.9 TIA (TRANSIENT ISCHEMIC ATTACK): Status: ACTIVE | Noted: 2017-02-24

## 2017-02-24 LAB
ALBUMIN SERPL BCP-MCNC: 2.6 G/DL
ALP SERPL-CCNC: 131 U/L
ALT SERPL W/O P-5'-P-CCNC: 13 U/L
ANION GAP SERPL CALC-SCNC: 18 MMOL/L
AST SERPL-CCNC: 11 U/L
BASOPHILS # BLD AUTO: 0.02 K/UL
BASOPHILS NFR BLD: 0.4 %
BILIRUB SERPL-MCNC: 0.3 MG/DL
BUN SERPL-MCNC: 59 MG/DL
CALCIUM SERPL-MCNC: 7.4 MG/DL
CHLORIDE SERPL-SCNC: 90 MMOL/L
CHOLEST/HDLC SERPL: 10.5 {RATIO}
CO2 SERPL-SCNC: 21 MMOL/L
CREAT SERPL-MCNC: 13 MG/DL
DIFFERENTIAL METHOD: ABNORMAL
EOSINOPHIL # BLD AUTO: 0.2 K/UL
EOSINOPHIL NFR BLD: 3.8 %
ERYTHROCYTE [DISTWIDTH] IN BLOOD BY AUTOMATED COUNT: 16.7 %
EST. GFR  (AFRICAN AMERICAN): 3 ML/MIN/1.73 M^2
EST. GFR  (NON AFRICAN AMERICAN): 3 ML/MIN/1.73 M^2
FOLATE SERPL-MCNC: 13.3 NG/ML
GLUCOSE SERPL-MCNC: 229 MG/DL
HCT VFR BLD AUTO: 27.2 %
HDL/CHOLESTEROL RATIO: 9.5 %
HDLC SERPL-MCNC: 19 MG/DL
HDLC SERPL-MCNC: 199 MG/DL
HEP. B SURF AB, QUAL: NEGATIVE
HEP. B SURF AB, QUANT.: <3 MIU/ML
HGB BLD-MCNC: 9.1 G/DL
HIV 1+2 AB+HIV1 P24 AG SERPL QL IA: NEGATIVE
LDLC SERPL CALC-MCNC: ABNORMAL MG/DL
LYMPHOCYTES # BLD AUTO: 2.2 K/UL
LYMPHOCYTES NFR BLD: 41 %
MAGNESIUM SERPL-MCNC: 1.5 MG/DL
MCH RBC QN AUTO: 33.6 PG
MCHC RBC AUTO-ENTMCNC: 33.5 %
MCV RBC AUTO: 100 FL
MONOCYTES # BLD AUTO: 0.4 K/UL
MONOCYTES NFR BLD: 7.3 %
NEUTROPHILS # BLD AUTO: 2.6 K/UL
NEUTROPHILS NFR BLD: 47.5 %
NONHDLC SERPL-MCNC: 180 MG/DL
PHOSPHATE SERPL-MCNC: 5.7 MG/DL
PLATELET # BLD AUTO: 365 K/UL
PMV BLD AUTO: 10.8 FL
POCT GLUCOSE: 192 MG/DL (ref 70–110)
POCT GLUCOSE: 254 MG/DL (ref 70–110)
POCT GLUCOSE: 324 MG/DL (ref 70–110)
POTASSIUM SERPL-SCNC: 3 MMOL/L
PROT SERPL-MCNC: 6.7 G/DL
RBC # BLD AUTO: 2.71 M/UL
RETIRED EF AND QEF - SEE NOTES: 65 (ref 55–65)
SODIUM SERPL-SCNC: 129 MMOL/L
TRIGL SERPL-MCNC: 460 MG/DL
TSH SERPL DL<=0.005 MIU/L-ACNC: 2.21 UIU/ML
VIT B12 SERPL-MCNC: 1017 PG/ML
WBC # BLD AUTO: 5.47 K/UL

## 2017-02-24 PROCEDURE — 97165 OT EVAL LOW COMPLEX 30 MIN: CPT

## 2017-02-24 PROCEDURE — 84443 ASSAY THYROID STIM HORMONE: CPT

## 2017-02-24 PROCEDURE — 82746 ASSAY OF FOLIC ACID SERUM: CPT

## 2017-02-24 PROCEDURE — 80061 LIPID PANEL: CPT

## 2017-02-24 PROCEDURE — 80053 COMPREHEN METABOLIC PANEL: CPT

## 2017-02-24 PROCEDURE — 36415 COLL VENOUS BLD VENIPUNCTURE: CPT

## 2017-02-24 PROCEDURE — 84100 ASSAY OF PHOSPHORUS: CPT

## 2017-02-24 PROCEDURE — 25000003 PHARM REV CODE 250

## 2017-02-24 PROCEDURE — 25000003 PHARM REV CODE 250: Performed by: STUDENT IN AN ORGANIZED HEALTH CARE EDUCATION/TRAINING PROGRAM

## 2017-02-24 PROCEDURE — 85025 COMPLETE CBC W/AUTO DIFF WBC: CPT

## 2017-02-24 PROCEDURE — 83735 ASSAY OF MAGNESIUM: CPT

## 2017-02-24 PROCEDURE — 25000003 PHARM REV CODE 250: Performed by: FAMILY MEDICINE

## 2017-02-24 PROCEDURE — 82607 VITAMIN B-12: CPT

## 2017-02-24 PROCEDURE — 86703 HIV-1/HIV-2 1 RESULT ANTBDY: CPT

## 2017-02-24 PROCEDURE — 86592 SYPHILIS TEST NON-TREP QUAL: CPT

## 2017-02-24 RX ORDER — ATORVASTATIN CALCIUM 40 MG/1
40 TABLET, FILM COATED ORAL NIGHTLY
Qty: 90 TABLET | Refills: 1 | Status: SHIPPED | OUTPATIENT
Start: 2017-02-24 | End: 2018-02-24

## 2017-02-24 RX ORDER — PROCHLORPERAZINE EDISYLATE 5 MG/ML
10 INJECTION INTRAMUSCULAR; INTRAVENOUS EVERY 8 HOURS
Status: DISCONTINUED | OUTPATIENT
Start: 2017-02-24 | End: 2017-02-24 | Stop reason: HOSPADM

## 2017-02-24 RX ORDER — CLOPIDOGREL BISULFATE 75 MG/1
75 TABLET ORAL DAILY
Qty: 910 TABLET | Refills: 1 | Status: SHIPPED | OUTPATIENT
Start: 2017-02-24 | End: 2017-02-24

## 2017-02-24 RX ORDER — CLOPIDOGREL BISULFATE 75 MG/1
75 TABLET ORAL DAILY
Qty: 910 TABLET | Refills: 1 | Status: SHIPPED | OUTPATIENT
Start: 2017-02-24 | End: 2017-08-10 | Stop reason: SDUPTHER

## 2017-02-24 RX ORDER — ATORVASTATIN CALCIUM 40 MG/1
40 TABLET, FILM COATED ORAL DAILY
Status: DISCONTINUED | OUTPATIENT
Start: 2017-02-24 | End: 2017-02-24 | Stop reason: HOSPADM

## 2017-02-24 RX ORDER — BUTALBITAL, ACETAMINOPHEN AND CAFFEINE 50; 325; 40 MG/1; MG/1; MG/1
1 TABLET ORAL EVERY 8 HOURS
Status: DISCONTINUED | OUTPATIENT
Start: 2017-02-24 | End: 2017-02-24 | Stop reason: HOSPADM

## 2017-02-24 RX ADMIN — INSULIN ASPART 6 UNITS: 100 INJECTION, SOLUTION INTRAVENOUS; SUBCUTANEOUS at 06:02

## 2017-02-24 RX ADMIN — CLOPIDOGREL 75 MG: 75 TABLET, FILM COATED ORAL at 10:02

## 2017-02-24 RX ADMIN — BUTALBITAL, ACETAMINOPHEN, AND CAFFEINE 1 TABLET: 50; 325; 40 TABLET ORAL at 12:02

## 2017-02-24 RX ADMIN — LANTHANUM CARBONATE 1000 MG: 500 TABLET, CHEWABLE ORAL at 07:02

## 2017-02-24 RX ADMIN — INSULIN DETEMIR 35 UNITS: 100 INJECTION, SOLUTION SUBCUTANEOUS at 10:02

## 2017-02-24 RX ADMIN — LANTHANUM CARBONATE 1000 MG: 500 TABLET, CHEWABLE ORAL at 11:02

## 2017-02-24 RX ADMIN — LANTHANUM CARBONATE 1000 MG: 500 TABLET, CHEWABLE ORAL at 06:02

## 2017-02-24 RX ADMIN — CINACALCET HYDROCHLORIDE 90 MG: 30 TABLET, COATED ORAL at 10:02

## 2017-02-24 RX ADMIN — ASPIRIN 81 MG: 81 TABLET, COATED ORAL at 10:02

## 2017-02-24 RX ADMIN — AMLODIPINE BESYLATE 10 MG: 5 TABLET ORAL at 10:02

## 2017-02-24 RX ADMIN — ASCORBIC ACID, FOLIC ACID, NIACIN, THIAMINE, RIBOFLAVIN, PYRIDOXINE, CYANOCOBALAMIN, PANTOTHENIC ACID, BIOTIN 1 CAPSULE: 100; 1.5; 1.7; 20; 10; 1; 6; 150; 5 CAPSULE, LIQUID FILLED ORAL at 10:02

## 2017-02-24 RX ADMIN — ATORVASTATIN CALCIUM 40 MG: 40 TABLET, FILM COATED ORAL at 03:02

## 2017-02-24 RX ADMIN — CALCITRIOL 0.5 MCG: 0.25 CAPSULE, LIQUID FILLED ORAL at 10:02

## 2017-02-24 NOTE — PT/OT/SLP EVAL
Occupational Therapy  Evaluation    Jenni Todd   MRN: [id number]   Admitting Diagnosis: CVA (cerebral vascular accident)    OT Date of Treatment: 17   OT Start Time: 1020  OT Stop Time: 1035  OT Total Time (min): 15 min    Billable Minutes:  Evaluation 15  Total Time 15    Diagnosis: CVA (cerebral vascular accident)   The primary encounter diagnosis was Dehydration. Diagnoses of Viral syndrome, Headache, unspecified headache type, Diarrhea, unspecified type, Volume depletion, ESRD (end stage renal disease) on PD ( initiated dialysis 2004), FSGS (focal segmental glomerulosclerosis) biopsy proven with collapsing features, Anemia associated with chronic renal failure, Type 2 diabetes 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, unspecified long term insulin use status, Dizziness, Neuropathy, Cerebrovascular accident (CVA), unspecified mechanism, and Cerebral infarction due to thrombosis of cerebral artery were also pertinent to this visit.      Past Medical History:   Diagnosis Date    Abnormal finding on Pap smear, HPV DNA positive     Anemia associated with chronic renal failure     on Epogen    Blood type B+     Bulging discs - symptomatic      CAD (coronary artery disease)     Diabetes mellitus, type 2     ESRD (end stage renal disease) 2004    FSGS (focal segmental glomerulosclerosis)     with collapsing    Hyperlipidemia     Hypertension     Neuropathy     Obesity     Secondary hyperparathyroidism, renal     TIA (transient ischemic attack)     Uterine fibroid     small uterine       Past Surgical History:   Procedure Laterality Date    CARDIAC CATHETERIZATION      PCI x 2     SECTION, CLASSIC      DIALYSIS FISTULA CREATION      multiple fistulas and grafts before PD     INCISION AND DRAINAGE OF WOUND Left     VULVAR ABCESS WITH NECROSIS    PERITONEAL CATHETER INSERTION      UMBILICAL HERNIA REPAIR         Referring physician: Dominick Thompson  "referred to OT: 2/23/2017    General Precautions: Standard, fall  Orthopedic Precautions: N/A  Braces: N/A    Do you have any cultural, spiritual, Yarsanism conflicts, given your current situation?: none     Patient History:  Living Environment  Lives With: child(crystal), adult (dad lives nearby and stays at night until she falls asllep)  Living Arrangements: house  Home Accessibility:  (has a tub/jacuzzi; 1 step to enter)  Transportation Available: car  Living Environment Comment: Pt. lives with step daughter in [facility] with step entry; has a jacuzzi tub with ledge and she sit on to bath; has a shower chair but does not use it.  Pt. indep -[place] ADLS and fx ambulation; uses scooter at work; SPT and rollator in community. Drives and fulltime worker at [employer].    Equipment Currently Used at Home:  (rollator, SPC, scooter and shower chair)    Prior level of function:   Bed Mobility/Transfers: needs device  Grooming: independent  Bathing: needs device  Upper Body Dressing: independent  Lower Body Dressing: independent  Toileting: independent  Home Management Skills: needs device and assist  Homemaking Responsibilities: Yes  Meal Prep Responsibility: Secondary  Laundry Responsibility: Primary  Cleaning Responsibility: Secondary  Bill Paying/Finance Responsibility: Primary  Shopping Responsibility: Secondary  Driving License: Yes  Mode of Transportation: Car  Occupation: Full time employment  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    Chief Complaint: headache  Patient/Family stated goals: none    Pain Rating:  (not rated)     Location - Orientation: generalized  Location:  ("all over")  Pain Addressed: Pre-medicate for activity, Reposition, Distraction  Pain Rating Post-Intervention:  (not rated)    Objective:  Patient found with: telemetry    Cognitive Exam:  Oriented to: Person, Place, Time and Situation  Follows " Commands/attention: Follows multistep  commands  Communication: clear/fluent  Memory:  No Deficits noted  Safety awareness/insight to disability: intact  Coping skills/emotional control: Appropriate to situation    Visual/perceptual:  Intact    Physical Exam:  Postural examination/scapula alignment: Rounded shoulder  Skin integrity: Visible skin intact  Edema: none noted    Sensation:   Intact    Upper Extremity Range of Motion:  Right Upper Extremity: WFL  Left Upper Extremity: WFL    Upper Extremity Strength:  Right Upper Extremity: WFL  Left Upper Extremity: WFL   Strength: WFL    Fine motor coordination:   Intact    Gross motor coordination: WFL    Functional Mobility:  Bed Mobility:  Rolling/Turning to Left: Modified independent  Rolling/Turning Right: Modified independent  Scooting/Bridging: Modified Independent  Supine to Sit: Modified Independent  Sit to Supine: Modified Independent    Transfers:  Sit <> Stand Assistance: Modified Independent  Sit <> Stand Assistive Device: No Assistive Device  Bed <> Chair Technique: Stand Pivot  Bed <> Chair Transfer Assistance: Modified Independent  Bed <> Chair Assistive Device: No Assistive Device  Toilet Transfer Assistive Device: grab bar  Tub Transfer Technique: Stand Pivot  Tub Bench Transfer Assistance: Modified Independent    Functional Ambulation: Oziel in room to bathroom and aback to bed.    Activities of Daily Living:  Feeding Level of Assistance: Independent    UE Dressing Level of Assistance: Independent    LE Dressing Level of Assistance: Modified independent    Grooming Position: Standing, Standing at sink  Grooming Level of Assistance: Modified independent  Toileting Where Assessed: Toilet  Toileting Level of Assistance: Modified independent            Balance:   Static Sit: NORMAL: No deviations seen in posture held statically  Dynamic Sit: NORMAL: No deviations seen in posture held dynamically  Static Stand: GOOD: Takes MODERATE challenges from all  "directions  Dynamic stand: GOOD+: Independent gait (with or without assistive device)    Therapeutic Activities and Exercises:  Role of OT and POC    AM-PAC 6 CLICK ADL  How much help from another person does this patient currently need?  1 = Unable, Total/Dependent Assistance  2 = A lot, Maximum/Moderate Assistance  3 = A little, Minimum/Contact Guard/Supervision  4 = None, Modified [place]/Independent    Putting on and taking off regular lower body clothing? : 4  Bathing (including washing, rinsing, drying)?: 4  Toileting, which includes using toilet, bedpan, or urinal? : 4  Putting on and taking off regular upper body clothing?: 4  Taking care of personal grooming such as brushing teeth?: 4  Eating meals?: 4  Total Score: 24    AM-PAC Raw Score CMS "G-Code Modifier Level of Impairment Assistance   6 % Total / Unable   7 - 9 CM 80 - 100% Maximal Assist   10 - 14 CL 60 - 80% Moderate Assist   15 - 19 CK 40 - 60% Moderate Assist   20 - 22 CJ 20 - 40% Minimal Assist   23 CI 1-20% SBA / CGA   24 CH 0% Independent/ Mod I       Patient left supine with all lines intact and call button in reach    Assessment:  Jenni Todd is a 46 y.o. female with a medical diagnosis of CVA (cerebral vascular accident) and presents with  indep-Oz[place] in room with ADLS and fx transfers.  HEP for  BUE recommended.  Continue with OT POC. No DME or therapy needs after DC.      Rehab identified problem list/impairments: Rehab identified problem list/impairments: weakness, impaired balance    Rehab potential is good.    Activity tolerance: Good    Discharge recommendations: Discharge Facility/Level Of Care Needs: home     Barriers to discharge: Barriers to Discharge: None    Equipment recommendations: none     GOALS:   Occupational Therapy Goals        Problem: Occupational Therapy Goal    Goal Priority Disciplines Outcome Interventions   Occupational Therapy Goal     OT, PT/OT Ongoing (interventions implemented as appropriate) "    Description:  Goals to be met by: 3/1/2017    Patient will increase functional independence with ADLs by performing:    Indep HEP BUE .                PLAN:  Patient to be seen 3 x/week to address the above listed problems via therapeutic exercises  Plan of Care expires: 03/24/17  Plan of Care reviewed with: patient         Anjelica Colón, OT  02/24/2017

## 2017-02-24 NOTE — PHYSICIAN QUERY
"PT Name: Jenni Todd  MR #: [id number]     Physician Query Form - Diagnosis Clarification    Reviewer Venice Stevens RN CDIS  Ext [email or web address]    This form is a permanent document in the medical record.     Query Date: February 24, 2017    By submitting this query, we are merely seeking further clarification of documentation.  Please utilize your independent clinical judgment when addressing the question(s) below.     (The Medical record reflects the following:)      Findings Supporting Clinical Information Location in Medical Record   CVA   -patient reported weakness, headache and difficulty walking   -CT head positive for "evolution of prior L PCA infarct"     MRI:  no acute infarct                 No acute intracranial abnormality, specifically no evidence of acute infarction.    PN 02/24              MRI     Please clarify if the ____CVA_________ diagnosis this admission has been:                 [   ]   Ruled In               [   ]   Ruled In, Now Resolved               [   ]   Resolved Prior to My Assessment               [  X ]   Ruled Out               [   ]   Clinically insignificant               [   ]   Clinically undetermined               [   ]   Other/Clarification of findings:_____________________________________       Please document in your progress notes daily for the duration of treatment, until resolved, and include in your discharge summary.                                                                                "

## 2017-02-24 NOTE — PROGRESS NOTES
.Pharmacy New Medication Education    Patient accepted medication education.    Pharmacy educated patient on the following medications, using the teach-back method.   Apap  Amlodipine  Asa  Calcitriol  Cinacalcet  Plavix  Novolog  Lanthanum  Magso4  nephocap    Learners of pharmacy medication education included:  patient    verbalize understanding

## 2017-02-24 NOTE — PLAN OF CARE
Pt to d/c today, discussed d/c plan. Pt plans to return to work. Pt has no HH or DME needs  Father at bedside ,will transport patient home.      Future Appointments  Date Time Provider Department Center   3/2/2017 10:40 AM Esteban Springer MD [facility]             02/24/17 1636   Final Note   Assessment Type Final Discharge Note   Discharge Disposition Home   Discharge planning education complete? Yes   What phone number can be called within the next 1-3 days to see how you are doing after discharge? [phone]   Hospital Follow Up  Appt(s) scheduled? Yes   Discharge plans and expectations educations in teach back method with documentation complete? Yes   Offered [facility] -- Bedside Delivery? Yes   Discharge/Hospital Encounter Summary to (non[facility]) PCP Yes   Referral to Outpatient Case Management complete? n/a   Referral to / orders for Home Health Complete? n/a   30 day supply of medicines given at discharge, if documented non-compliance / non-adherence? n/a   Any social issues identified prior to discharge? n/a   Did you assess the readiness or willingness of the family or caregiver to support self management of care? Yes   Right Care Referral Info   Post Acute Recommendation No Care

## 2017-02-24 NOTE — PROGRESS NOTES
[facility] Consult Note     Subjective  Ms. Todd states that her fatigue and weakness have gotten better. She states that she woke up headache free today but that it started to gradually come back. She now has a headache with a severity of 4/10.    Allergies - Clindamycin and Metronidazole     No current facility-administered medications on file prior to encounter.              Current Outpatient Prescriptions on File Prior to Encounter   Medication Sig Dispense Refill    amlodipine (NORVASC) 10 MG tablet 10 mg once daily.         aspirin (ECOTRIN) 81 MG EC tablet Take 1 tablet (81 mg total) by mouth once daily. 30 tablet 12    atorvastatin (LIPITOR) 40 MG tablet Take 1 tablet (40 mg total) by mouth every evening. 90 tablet 3    calcitRIOL (ROCALTROL) 0.5 MCG Cap Take 1 capsule by mouth once daily. 1 Capsule Oral Every day        cinacalcet (SENSIPAR) 90 MG Tab Take 1 tablet by mouth once daily. 1 tablet Oral Every day        gabapentin (NEURONTIN) 300 MG capsule TAKE 1 BY MOUTH EVERY MORN-ING AND 2 BY MOUTH EVERY EVENING 90 capsule 0    HUMULIN R 100 unit/mL injection          lanthanum (FOSRENOL) 1000 MG chewable tablet Take 1,000 mg by mouth 3 (three) times daily with meals.         nateglinide (STARLIX) 120 MG tablet Take 1 tablet by mouth before meals as needed. 1 Tablet Oral As directed. STARLIX 120MG 30 MINUTES BEFORE BREAKFAST AND SUPPER.        PLAVIX 75 mg tablet TAKE 1 BY MOUTH DAILY 90 tablet 1    vitamin renal formula, B-complex-vitamin c-folic acid, (B COMPLEX-C-FOLIC ACID) 1 mg Cap Take 1 capsule by mouth once daily. 1 Capsule Oral Every day                Vitals -       Vitals           Vitals:     02/22/17 2015 02/23/17 0006 02/23/17 0505 02/23/17 0729   BP: (!) 86/61 (!) 109/54 (!) 106/49 129/60   BP Location: Right leg Left leg Left leg     Patient Position: Lying Lying Lying Lying   BP Method: Automatic Automatic Automatic Automatic   Pulse: 86 83 83 80   Resp: 16 16 16 17    Temp: 99.3 °F (37.4 °C) 99.2 °F (37.3 °C) 99 °F (37.2 °C) 99.1 °F (37.3 °C)   TempSrc: Oral Oral Oral Oral   SpO2:           Weight:           Height:                    Neurological Exam -          Orientation:   Oriented to time, place, person and situation      Language: Spontaneous, fluent; able to repeat and name objects       Cranial nerves: PERRL, EOMI, facial sensation intact, face symmetrical, hearing grossly intact, palate raises midline, shoulder shrug strength normal, tongue protrudes midline.     Musculoskeletal:  Muscle strength:   5/5 in bilateral upper extremities  4/5 in the bilateral lower extremities  No pronator drift  Sensation: Intact to light touch, pin prick, vibration in all extremities    Deep tendon Reflexes: 2+ bilateral triceps, biceps, and brachioradialis; 2+ in bilateral patellae. Positive ankle jerks.  Plantar flexor responses bilaterally     Cerebellar and Coordination:  Gait: deferred   Finger-to-nose and heel-to-shin: no dysmetria      Labs -        Recent Labs  Lab 02/21/17  2304 02/22/17  0651 02/23/17  0556   WBC 7.93 7.19 6.64   HGB 10.7* 10.9* 9.0*   HCT 31.2* 32.2* 26.5*   * 343 339   * 130* 129*   K 4.2 4.1 3.9   CL 86* 89* 91*   CO2 20* 19* 18*   BUN 59* 59* 60*   * 171* 199*   PROT 8.5* 8.1 6.9   ALBUMIN 3.2* 3.2* 2.7*   BILITOT 0.4 0.4 0.3   AST 15 12 12   ALKPHOS 122 117 135   ALT 16 14 14         Imaging -   CT Head w/o contrast 2/21/17:  Study is somewhat limited by beam hardening with streak artifact, particularly at the skull base. No convincing evidence of hemorrhage, mass, midline shift or acute major vascular territory infarct. Interval progression of small area of encephalomalacia involving the left parietal lobe consistent with remote infarct.      Inferior left cerebellar small remote infarct again noted. No definite new focal areas of abnormal parenchymal attenuation. The ventricular system is normal in size for age and demonstrates no  distortion by mass effect. Bilateral basal ganglia calcifications noted.  No extra-axial hemorrhage or mass. The extracranial structures are within normal limits.  No displaced calvarial fracture.  Partially imaged small retention cyst versus polyp at the right maxillary sinus. Remaining visualized portions of the paranasal sinuses and mastoid air cells are clear.     MRI Brain w/o contrast 7/13/15:  1.  Acute non-hemorrhagic watershed territory infarction in the left parietal occipital junction.    2.  The focal abnormality in the medial surface of the right cerebellum seen on the previous study is no longer visualized on the current study and is felt to have been artifactual finding.    MRI Brain w/o contrast: 2/23/17:  There are small areas of remote infarction with mild encephalomalacia seen at the watershed region of the left parietal-occipital lobe and within the left cerebellar hemisphere.  Mild gradient susceptibility seen in the aforementioned left parieto-occipital region suggestive for remote blood products or hemosiderin deposition.  Punctate focus of gradient susceptibility suggestive for remote microhemorrhage seen in the right cerebellar hemisphere and within the left frontal lobe, similar to previous exam.  No diffusion signal abnormality to suggest acute infarction.  Ventricles are normal in size and configuration without evidence for hydrocephalus.  No acute intracranial hemorrhage or extraaxial fluid collection.  No mass or mass effect.  Flow voids are normal in appearance.        Small mucous retention cyst seen within the right maxillary antrum.  Paranasal sinuses and mastoid air cells are otherwise clear.  Orbits appear normal.     Assessment and Plan -   -MRI Brain from July 2015 shows previous area of infarction in the Left parieto-occipital area.  -MRI Brain from yesterday (2/23/17): no acute infarction, stable areas of remote infarction, and stable focus of gradient susceptibility in the R  cerebellar hemisphere and L frontal lobe w/ remote microhemorrhage.  -CT Head: as above   -HgA1c: 9  Recommendations:  -TSH, Vit B12, Folate, HIV, RPR, Lipid panel  -Due to age of MsBoogie Todd being < 50. May consider hypercoaguable work-up given remote infarcts on MRI: PT/INR, APTT, D-dimer, Antithromibin III, Fibrinogen, Factor 5 Leiden, DRVVT, Prothrombin gene mutation, Homocysteine, Protein C, Protein S, Cardiolipin antibody, Cadiolipin antibody IgA, Lipoprotein A, lupus anticoagulant, and hemoglobin electrophoresis. It is possible that being on dialysis chronically has contributed to her vasculopathies.  -Cont DAPT ASA 81 mg and Plavix  -Recommend high intensity statin for stroke ppx given hx of previous infarcts.  -For headache Fioricet PO TID for 3 doses and Compazine 10 mg IV x 1 dose to be given with Fioricet.  -Discontinue Tylenol while she is receiving Fioricet. Maximum daily dose of Acetaminophen is 4g.     Case was discussed w/ staff, Dr. Tillman.     Kevyn Clemons MD  [facility] PGY 2

## 2017-02-24 NOTE — PT/OT/SLP PROGRESS
Physical Therapy VERONICA Arteaga Koki Todd   MRN: [id number]     Patient seen supine in bed this am. Patient reports she feels she is currently at her baseline in regards to ambulation skill and does not need PT service at this time. Will DC PT service at patient's request.        Adolph Trejo, PT

## 2017-02-24 NOTE — PLAN OF CARE
Problem: Patient Care Overview  Goal: Plan of Care Review  Outcome: Ongoing (interventions implemented as appropriate)  AAOX3.  Respirations even and unlabored; breath sounds clear.  RA with saturation 100%.  Denies pain, n/v, and sob.  Glucose 319; given levemir 35 units sq and novolog 4 units sq and glucose increased to 324 at 3am.  Instructed and demonstrated on insulin administration; patient demonstrated.  Needs follow-up.  Peritoneal dialysis overnight. Instructed to call for assistance.  Will cont to monitor.

## 2017-02-24 NOTE — PROGRESS NOTES
Progress Note  [facility]  Admit Date: 2/21/2017   LOS: 2 days   SUBJECTIVE:     Patient seen and examined this AM. Reports feeling better than yesterday. Headache is at a 3/10 today. Reports her gait is better than when she came in.     Review of Systems   Constitutional: Negative for fever.   Eyes: Positive for photophobia.   Respiratory: Negative for cough and hemoptysis.    Cardiovascular: Negative for chest pain and palpitations.   Gastrointestinal: Negative for heartburn and nausea.   Genitourinary: Negative for dysuria and urgency.   Musculoskeletal: Negative for joint pain, myalgias and neck pain.   Skin: Negative for rash.   Neurological: Positive for headaches. Negative for tremors.   Psychiatric/Behavioral: Negative for hallucinations.       OBJECTIVE:   Vital Signs (Most Recent)  Temp: 98 °F (36.7 °C) (02/24/17 1200)  Pulse: 80 (02/24/17 1200)  Resp: 17 (02/24/17 1200)  BP: (!) 173/86 (02/24/17 1200)  SpO2: 100 % (02/22/17 1429)    I & O (Last 24H):  Intake/Output Summary (Last 24 hours) at 02/24/17 1330  Last data filed at 02/24/17 1200   Gross per 24 hour   Intake              570 ml   Output              500 ml   Net               70 ml     Wt Readings from Last 3 Encounters:   02/22/17 91.4 kg (201 lb 8 oz)   01/16/17 95.7 kg (211 lb)   11/03/16 97.6 kg (215 lb 2.7 oz)       Current Diet Order   Procedures    Diet Diabetic 1800 Calories Renal, Cardiac (Low Na/Chol)     Order Specific Question:   Additional restrictions:     Answer:   Renal     Order Specific Question:   Additional restrictions:     Answer:   Cardiac (Low Na/Chol)        Physical Exam   Constitutional: She is oriented to person, place, and time. No distress.   HENT:   Head: Normocephalic and atraumatic.   Eyes: Right eye exhibits no discharge. Left eye exhibits no discharge.   Neck: Normal range of motion. Neck supple.   Cardiovascular: Normal rate and regular rhythm.    Pulmonary/Chest: Effort normal. No respiratory  distress.   Abdominal: Soft. She exhibits no distension.   Musculoskeletal: Normal range of motion. She exhibits no edema.   Neurological: She is alert and oriented to person, place, and time.   Skin: Skin is warm. She is not diaphoretic.   Psychiatric: Affect and judgment normal.         Laboratory Data:  CBC    Recent Labs  Lab 02/22/17  0651 02/23/17  0556 02/24/17  0546   WBC 7.19 6.64 5.47   RBC 3.20* 2.64* 2.71*   HGB 10.9* 9.0* 9.1*   HCT 32.2* 26.5* 27.2*    339 365*   * 100* 100*   MCH 34.1* 34.1* 33.6*   MCHC 33.9 34.0 33.5     CMP    Recent Labs  Lab 02/22/17  0651 02/23/17  0556 02/24/17  0546   CALCIUM 8.8 7.7* 7.4*   PROT 8.1 6.9 6.7   * 129* 129*   K 4.1 3.9 3.0*   CO2 19* 18* 21*   CL 89* 91* 90*   BUN 59* 60* 59*   CREATININE 13.4* 13.0* 13.0*   ALKPHOS 117 135 131   ALT 14 14 13   AST 12 12 11   BILITOT 0.4 0.3 0.3     POCT-Glucose  POCT Glucose   Date Value Ref Range Status   02/24/2017 192 (H) 70 - 110 mg/dL Final   02/24/2017 254 (H) 70 - 110 mg/dL Final   02/24/2017 324 (H) 70 - 110 mg/dL Final   02/23/2017 319 (H) 70 - 110 mg/dL Final   02/23/2017 238 (H) 70 - 110 mg/dL Final   02/23/2017 211 (H) 70 - 110 mg/dL Final   02/23/2017 214 (H) 70 - 110 mg/dL Final   02/22/2017 312 (H) 70 - 110 mg/dL Final   02/22/2017 191 (H) 70 - 110 mg/dL Final   02/22/2017 174 (H) 70 - 110 mg/dL Final   02/21/2017 251 (H) 70 - 110 mg/dL Final     MICRO  Microbiology Results (last 7 days)     Procedure Component Value Units Date/Time    Blood Culture #1 **CANNOT BE ORDERED STAT** [[id number]] Collected:  02/21/17 2315    Order Status:  Completed Specimen:  Blood from Peripheral, Lower Arm, Right Updated:  02/24/17 0613     Blood Culture, Routine No Growth to date     Blood Culture, Routine No Growth to date     Blood Culture, Routine No Growth to date    Blood Culture #2 **CANNOT BE ORDERED STAT** [[id number]] Collected:  02/21/17 2330    Order Status:  Completed Specimen:  Blood from Peripheral,  "Forearm, Left Updated:  02/24/17 0613     Blood Culture, Routine No Growth to date     Blood Culture, Routine No Growth to date     Blood Culture, Routine No Growth to date        LIPID PANEL  Lab Results   Component Value Date    CHOL 199 02/24/2017     Lab Results   Component Value Date    HDL 19 (L) 02/24/2017     Lab Results   Component Value Date    LDLCALC Invalid, Trig>400.0 02/24/2017     Lab Results   Component Value Date    TRIG 460 (H) 02/24/2017     Lab Results   Component Value Date    CHOLHDL 9.5 (L) 02/24/2017       Diagnostic Results:  Imaging in last 24 hours:    MRI brain: no acute infarct    Echo heart: no evidence of cardiac shunt. EF 60, normal right ventricular function    ASSESSMENT/PLAN:     45 yo AAF with pmh of ESRD (PD daily), CAD, HTN admitted for dehydration, had PD yesterday c/o headache with difficulty walking. CT head was positive for "evolution of prior L PCA infarct." Patient symptomatic for CVA so consulted neurology for CVA.           CVA  -patient reported weakness, headache and difficulty walking   -CT head positive for "evolution of prior L PCA infarct"  -MRI: no acute infarct  -RPR, HIV, TSH, B-12, folate all WNL  -Echo heart: unremarkable  -lipid panel: high triglycerides   -PT/OT on board.  No discharge barriers  -on plavix, aspirin and statin     ESRD (on home PD nightly)  -Consulted nephro, malachi recs  -Had PD yesterday  - K 3.0, Cr 13.0  -f/u neuro recs     Headache  -Fioricept TID for 3 doses  -appreciate neuro recs  -will d/c tylenol          DM  -A1c now 9.0, on 4/16 it was 8.2  -Will give levemir 35 U BID   -SSI  -POCT glucose      CAD s/p PCI of RCA in 2012  -home medication: Plavix and Asa, will continue  -EF 55 (7/7/2015)      HTN  -on home medication amlodipine                Code: full  Diet: diabetic, renal, cardiac   Ppx: scds  Dispo: patient still has headache, will give Fioricet and reasess, f/u nephro recs     2/24/2017 London Howell MD  1:30 PM      "

## 2017-02-24 NOTE — PT/OT/SLP DISCHARGE
Physical Therapy Discharge Summary    Jenni Todd  MRN: [id number]   CVA (cerebral vascular accident)   Patient Discharged from acute Physical Therapy on 2017.  Please refer to prior PT noted date on 2017 for functional status.     Assessment:   Patient DC from PT service per her request  GOALS:   Physical Therapy Goals        Problem: Physical Therapy Goal    Goal Priority Disciplines Outcome Goal Variances Interventions   Physical Therapy Goal     PT/OT, PT Ongoing (interventions implemented as appropriate)     Description:  Goals to be met by: 3/23/2017     Patient will increase functional independence with mobility by performin. Gait  x >200 feet with Modified [place] using Single-point Cane .               Reasons for Discontinuation of Therapy Services  Patient declines to continue care.      Plan:  Patient Discharged to: Inhouse with walking program.

## 2017-02-24 NOTE — PLAN OF CARE
Problem: Occupational Therapy Goal  Goal: Occupational Therapy Goal  Goals to be met by: 3/1/2017    Patient will increase functional independence with ADLs by performing:    Indep HEP BUE .  Outcome: Ongoing (interventions implemented as appropriate)  Pt. Demonstrates indep-Oziel in room with ADLS and fx transfers.  HEP for  BUE recommended.  Continue with OT POC. No DME or therapy needs after DC.

## 2017-02-25 LAB — RPR SER QL: NORMAL

## 2017-02-27 LAB
BACTERIA BLD CULT: NORMAL
BACTERIA BLD CULT: NORMAL

## 2017-03-02 ENCOUNTER — OFFICE VISIT (OUTPATIENT)
Dept: FAMILY MEDICINE | Facility: HOSPITAL | Age: 47
End: 2017-03-02
Attending: FAMILY MEDICINE
Payer: MEDICARE

## 2017-03-02 VITALS
HEIGHT: 68 IN | HEART RATE: 92 BPM | DIASTOLIC BLOOD PRESSURE: 75 MMHG | SYSTOLIC BLOOD PRESSURE: 120 MMHG | BODY MASS INDEX: 32.84 KG/M2 | WEIGHT: 216.69 LBS

## 2017-03-02 DIAGNOSIS — R51.9 PERSISTENT HEADACHES: ICD-10-CM

## 2017-03-02 DIAGNOSIS — N18.6 ESRD (END STAGE RENAL DISEASE): Primary | ICD-10-CM

## 2017-03-02 DIAGNOSIS — E86.0 DEHYDRATION: ICD-10-CM

## 2017-03-02 PROCEDURE — 99214 OFFICE O/P EST MOD 30 MIN: CPT | Performed by: FAMILY MEDICINE

## 2017-03-02 RX ORDER — AMOXICILLIN 500 MG/1
CAPSULE ORAL
Refills: 0 | COMMUNITY
Start: 2017-02-06 | End: 2017-03-02 | Stop reason: ALTCHOICE

## 2017-03-02 RX ORDER — LISINOPRIL 40 MG/1
TABLET ORAL
COMMUNITY
Start: 2017-01-04 | End: 2017-03-02

## 2017-03-02 RX ORDER — CHLORHEXIDINE GLUCONATE ORAL RINSE 1.2 MG/ML
SOLUTION DENTAL
COMMUNITY
Start: 2017-03-01 | End: 2017-03-02

## 2017-03-02 RX ORDER — HYDROCODONE BITARTRATE AND ACETAMINOPHEN 7.5; 325 MG/1; MG/1
1 TABLET ORAL
Refills: 0 | COMMUNITY
Start: 2017-02-06 | End: 2017-11-29

## 2017-03-02 RX ORDER — BLOOD-GLUCOSE METER
EACH MISCELLANEOUS
Refills: 3 | Status: ON HOLD | COMMUNITY
Start: 2017-02-24 | End: 2019-01-01 | Stop reason: HOSPADM

## 2017-03-02 NOTE — MR AVS SNAPSHOT
[facility]  [address], [address]  [place] [address]  Phone: [phone]  Fax: [phone]                  Jenni Todd   3/2/2017 10:40 AM   Office Visit    Description:  Female : 1970   Provider:  Esteban Springer MD   Department:  [facility]           Reason for Visit     Hospital Follow Up                To Do List           Future Appointments        Provider Department Dept Phone    2017 2:40 PM Michael Tan III, MD [facility] [phone]      Goals (5 Years of Data)     None      [facility]     [facility] Care Line -  Assistance  Registered nurses in the [facility] provide clinical advisement, health education, appointment booking, and other advisory services.  Call for this free service at [phone].             Medications           Message regarding Medications     Verify the changes and/or additions to your medication regime listed below are the same as discussed with your clinician today.  If any of these changes or additions are incorrect, please notify your healthcare provider.        STOP taking these medications     amoxicillin (AMOXIL) 500 MG capsule TAKE 1 CAPSULE BY MOUTH EVERY 6 HOURS UNTIL FINISHED    chlorhexidine (PERIDEX) 0.12 % solution     lisinopril (PRINIVIL,ZESTRIL) 40 MG tablet            Verify that the below list of medications is an accurate representation of the medications you are currently taking.  If none reported, the list may be blank. If incorrect, please contact your healthcare provider. Carry this list with you in case of emergency.           Current Medications     amlodipine (NORVASC) 10 MG tablet 10 mg once daily.     aspirin (ECOTRIN) 81 MG EC tablet Take 1 tablet (81 mg total) by mouth once daily.    atorvastatin (LIPITOR) 40 MG tablet Take 1 tablet (40 mg total) by mouth every evening.    calcitRIOL (ROCALTROL) 0.5 MCG Cap Take 1 capsule by  mouth 2 (two) times daily.     cinacalcet (SENSIPAR) 90 MG Tab Take 1 tablet by mouth once daily.     clopidogrel (PLAVIX) 75 mg tablet Take 1 tablet (75 mg total) by mouth once daily.    epoetin adonay (EPOGEN) 10,000 unit/mL injection Inject 100 Units/kg into the skin. Every other week    gabapentin (NEURONTIN) 300 MG capsule TAKE 1 BY MOUTH EVERY MORN-ING AND 2 BY MOUTH EVERY   EVENING    HUMULIN R 100 unit/mL injection Inject 200 Units into the skin every evening.     lanthanum (FOSRENOL) 1000 MG chewable tablet Take 1,000 mg by mouth 3 (three) times daily with meals.      nateglinide (STARLIX) 120 MG tablet STARLIX 120MG 30 MINUTES BEFORE EACH MEAL.    vitamin renal formula, B-complex-vitamin c-folic acid, (B COMPLEX-C-FOLIC ACID) 1 mg Cap Take 1 capsule by mouth once daily. 1 Capsule Oral Every day    hydrocodone-acetaminophen 7.5-325mg (NORCO) 7.5-325 mg per tablet Take 1 tablet by mouth every 4 to 6 hours as needed.    ONETOUCH VERIO Strp USE 1 STRIP TWICE DAILY IN VITRO    oxycodone-acetaminophen (PERCOCET) 5-325 mg per tablet Take 1 tablet by mouth every 6 (six) hours as needed for Pain.           Clinical Reference Information           Your Vitals Were     BP                   120/75           Blood Pressure          Most Recent Value    BP  120/75      Allergies as of 3/2/2017     Clindamycin    Flagyl [Metronidazole Hcl]      Immunizations Administered on Date of Encounter - 3/2/2017     None      Maintenance Dialysis History     Start End Type Comments Center    4/20/2004  Peritoneal  [facility]            Current Dialysis Center Information     [facility] [address] Phone #:  [phone]    Contact:  [place]  [address] Fax #:  [phone]            Transplant Information        Txp Date Organ Coordinator Care Team     Kidney Hossein Coleman Jr., RN Referring Physician:  Gurvinder Watt MD   Current Nephrologist:  Gurvinder Watt MD         Language Assistance  Services     ATTENTION: Language assistance services are available, free of charge. Please call [phone].      ATENCIÓN: Si habla lacy, tiene a yuen disposición servicios gratuitos de asistencia lingüística. Llame al [phone].     CHÚ Ý: N?u b?n nói Ti?ng Vi?t, có các d?ch v? h? tr? ngôn ng? mi?n phí dành cho b?n. G?i s? [phone].         [facility] complies with applicable Federal civil rights laws and does not discriminate on the basis of race, color, national origin, age, disability, or sex.

## 2017-03-02 NOTE — PROGRESS NOTES
Subjective:       Patient ID: Jenni Todd is a 46 y.o. female.    Chief Complaint: Hospital Follow Up    HPI Comments: Patient is here in clinic for inpatient follow up. Was admitted to inpatient team for acute frontal migrain along with hypotension 80/50. While admitted patient had fluid resucitation, neuro consulted with imaging of brain that did not show any acute pathology of concern. Since discharge patient states that her headaches have resolved but occaisionally has sharp frontal head pain that lasts about 3 mins then resolve on its own. Patient does not have photphobia, nausea, vomiting, syncope, neurological dysfunction associated with these episodes. Also states that she does PD as needed with weight gain and follows Dr. Tavares nephrology monthly.     Review of Systems   Constitutional: Negative for chills and fever.   HENT: Negative for congestion and sore throat.    Eyes: Negative for photophobia, pain, redness and visual disturbance.   Respiratory: Negative for cough, choking, chest tightness and shortness of breath.    Cardiovascular: Negative for chest pain.   Gastrointestinal: Negative for abdominal pain, constipation, diarrhea, nausea and vomiting.   Endocrine: Negative for polyuria.   Genitourinary: Negative for dysuria.   Musculoskeletal: Negative for back pain.   Neurological: Positive for headaches. Negative for dizziness, tremors, seizures, syncope, facial asymmetry, speech difficulty, weakness, light-headedness and numbness.       Objective:      Vitals:    03/02/17 1109   BP: 120/75   Pulse: 92     Physical Exam   Constitutional: She is oriented to person, place, and time. She appears well-developed and well-nourished.   HENT:   Head: Normocephalic and atraumatic.   Neck: Normal range of motion. Neck supple.   Cardiovascular: Normal rate, regular rhythm, normal heart sounds and intact distal pulses.  Exam reveals no gallop and no friction rub.    No murmur heard.  Pulmonary/Chest: Effort  normal and breath sounds normal. No respiratory distress. She has no wheezes. She has no rales. She exhibits no tenderness.   Abdominal: Soft. Bowel sounds are normal. There is no tenderness.   Musculoskeletal: Normal range of motion. She exhibits no edema or deformity.   Neurological: She is alert and oriented to person, place, and time.   Skin: Skin is warm and dry.   Nursing note and vitals reviewed.      Assessment:       1. ESRD (end stage renal disease) on PD ( initiated dialysis 04/20/2004)    2. Dehydration    3. Persistent headaches        Plan:       ESRD (end stage renal disease) on PD ( initiated dialysis 04/20/2004)  -     Comprehensive metabolic panel; Future; Expected date: 3/2/17    Dehydration  -     Comprehensive metabolic panel; Future; Expected date: 3/2/17    Persistent headaches  Due to symptoms not being migrain like, have rec patient to follow up with Nephrology for possibly taking less fluids off that may cause hypovalumia leading to headaches. Will also follow up on lytes to determine if abnormalities may cause headaches as well. Will not start patient on abortive or prophylactic medications.      Return in about 4 weeks (around 3/30/2017).    Esteban Springer MD  3/2/2017  12:18 PM  [facility] PGY-2

## 2017-03-09 NOTE — PROGRESS NOTES
47 yo F on peritoneal dialysis for ESRD for f/u of recent hospitalization.  Doing better but will monitor electrolytes.  Headaches being evaluated.   I discussed the patient with the resident physician and agree with the assessment and plan.

## 2017-05-26 NOTE — PT/OT/SLP DISCHARGE
Occupational Therapy Discharge Summary    Jenni Todd  MRN: [id number]   TIA (transient ischemic attack)   Patient Discharged from acute Occupational Therapy on 2/21/17.  Please refer to prior OT note dated on 2/21/17 for functional status.     Assessment:   Patient appropriate for care in another setting.  GOALS:    Occupational Therapy Goals        Problem: Occupational Therapy Goal    Goal Priority Disciplines Outcome Interventions   Occupational Therapy Goal     OT, PT/OT Ongoing (interventions implemented as appropriate)    Description:  Goals to be met by: 3/1/2017    Patient will increase functional independence with ADLs by performing:    Indep HEP BUE .                    Reasons for Discontinuation of Therapy Services  Transfer to alternate level of care.      Plan:  Patient Discharged to: home. .      Alia Luna, OT  5/26/2017

## 2017-05-29 RX ORDER — GABAPENTIN 300 MG/1
CAPSULE ORAL
Qty: 270 CAPSULE | Refills: 2 | Status: SHIPPED | OUTPATIENT
Start: 2017-05-29 | End: 2018-03-29 | Stop reason: DRUGHIGH

## 2017-06-28 NOTE — LETTER
IMPORTANT      July 5, 2017    Jenni Todd  [address] [facility] [facility] [address]     Re: [id number]    Dear Mr/Mrs Todd,    Thank you for choosing [facility] as your health care provider.  We are committed to assisting you with timely insurance filing and payment of your account.  To protect your liability, updated insurance information must be given to us at the time of service and we should be notified immediately if      · Your insurance benefits/plan changes.   You become eligible for any other benefits   Your current plan/coverage terms.    Also, please bring in a copy of your insurance premium payment if you have one of the following types of insurance:    · Coverage from a intermediate plan.  · Coverage from the Affordable Healthcare Act Plan.  · Coverage from a COBRA plan  · Premium paid by the [employer].    To ensure we have the correct insurance (Medical/Pharmacy) on file and to answer any questions regarding your benefits, please call us at [phone] or [phone] and ask to speak to the kidney  indicated below:      Transplant Dept  Kaz Vences   Heart   Rhoda Escalantemirajeev   Kidney (A-K) and Lung  Avelinopedro Trinidadevan    Kidney (L-Z)  Ana María Sanchez   Liver    We look forward to hearing from you soon.    Sincerely,      [facility]

## 2017-06-28 NOTE — Clinical Note
June 28, 2017        [facility]  [address]  [facility] [address]          The patient below is is currently being dialized at your dialysis center/unit and who is on the kidney waiting list at [facility].     Jenni Todd   [facility] Number: [id number]    To monitor the antibody levels that are essential in transplantation and which fluctuate   from month to month, it is imperative that we keep a record of monthly antibody titers. Therefore, we would like to have you draw one 10 ml RED top tube BEFORE dialysis   begins on the above specified patient.                                                    IMPORTANT NOTICE  SAMPLES THAT DO NOT HAVE CORRECT LABELLING INFORMATION WILL BE          REFUSED DUE TO LABORATORY REGULATIONS AND GUIDELINES    All tubes MUST BE labeled with the following information: PATIENT NAME, either DATE OF BIRTH or [facility] NUMBER and DATE DRAWN. Sample must be mailed within two to three days of the draw so that it is still a usable sample once received. Tubes do not have to be iced nor serum must be  from clot. It is imperative that the blood samples for the month be received in the [facility] Laboratory by the end of the month, (preferably by the 15th).        Please send samples to :       [facility]     [address], [address]      [place] [address]        Please contact the [facility] staff at [phone] if additional collection or shipping supplies are needed. Thank you for your cooperation.      Sincerely,  [facility] Team

## 2017-07-05 NOTE — ADDENDUM NOTE
Encounter addended by: Monique Esteban on: 7/5/2017 10:40 AM<BR>    Actions taken: Letter status changed

## 2017-07-31 DIAGNOSIS — Z76.82 ORGAN TRANSPLANT CANDIDATE: ICD-10-CM

## 2017-08-11 RX ORDER — CLOPIDOGREL BISULFATE 75 MG/1
TABLET, FILM COATED ORAL
Qty: 90 TABLET | Refills: 0 | Status: ON HOLD | OUTPATIENT
Start: 2017-08-11 | End: 2019-01-01 | Stop reason: HOSPADM

## 2017-10-05 ENCOUNTER — HOSPITAL ENCOUNTER (OUTPATIENT)
Facility: OTHER | Age: 47
Discharge: HOME | End: 2017-10-05
Attending: INTERNAL MEDICINE | Admitting: INTERNAL MEDICINE
Payer: MEDICARE

## 2017-10-05 VITALS
HEART RATE: 100 BPM | BODY MASS INDEX: 32.96 KG/M2 | RESPIRATION RATE: 18 BRPM | OXYGEN SATURATION: 100 % | HEIGHT: 67 IN | SYSTOLIC BLOOD PRESSURE: 160 MMHG | WEIGHT: 210 LBS | TEMPERATURE: 100 F | DIASTOLIC BLOOD PRESSURE: 102 MMHG

## 2017-10-05 DIAGNOSIS — T82.49XA MECHANICAL COMPLICATIONS DUE TO HEMODIALYSIS CATHETER: ICD-10-CM

## 2017-10-05 DIAGNOSIS — T82.49XA MECHANICAL COMPLICATIONS DUE TO HEMODIALYSIS CATHETER, INITIAL ENCOUNTER: ICD-10-CM

## 2017-10-05 LAB — POCT GLUCOSE: 161 MG/DL (ref 70–110)

## 2017-10-05 PROCEDURE — 36581 REPLACE TUNNELED CV CATH: CPT | Mod: TXP | Performed by: INTERNAL MEDICINE

## 2017-10-05 PROCEDURE — C1769 GUIDE WIRE: HCPCS | Mod: TXP | Performed by: INTERNAL MEDICINE

## 2017-10-05 PROCEDURE — C1752 CATH,HEMODIALYSIS,SHORT-TERM: HCPCS | Mod: TXP | Performed by: INTERNAL MEDICINE

## 2017-10-05 PROCEDURE — 63600175 PHARM REV CODE 636 W HCPCS: Mod: TXP | Performed by: INTERNAL MEDICINE

## 2017-10-05 PROCEDURE — 25000003 PHARM REV CODE 250: Mod: TXP

## 2017-10-05 PROCEDURE — 63600175 PHARM REV CODE 636 W HCPCS: Mod: TXP

## 2017-10-05 PROCEDURE — 25000003 PHARM REV CODE 250: Mod: TXP | Performed by: INTERNAL MEDICINE

## 2017-10-05 RX ORDER — LIDOCAINE HYDROCHLORIDE 10 MG/ML
1 INJECTION, SOLUTION EPIDURAL; INFILTRATION; INTRACAUDAL; PERINEURAL ONCE
Status: DISCONTINUED | OUTPATIENT
Start: 2017-10-05 | End: 2017-10-05 | Stop reason: HOSPADM

## 2017-10-05 RX ORDER — HEPARIN SOD,PORCINE/0.9 % NACL 1000/500ML
INTRAVENOUS SOLUTION INTRAVENOUS
Status: DISCONTINUED | OUTPATIENT
Start: 2017-10-05 | End: 2017-10-05 | Stop reason: HOSPADM

## 2017-10-05 RX ORDER — ALPRAZOLAM 0.5 MG/1
TABLET, ORALLY DISINTEGRATING ORAL
Status: DISCONTINUED | OUTPATIENT
Start: 2017-10-05 | End: 2017-10-05 | Stop reason: HOSPADM

## 2017-10-05 NOTE — OR NURSING
Dr. Munroe aware of patients increased temp. 100.2. Dr. Munroe wrote order for patient to get antibiotics at dialysis. Patient to be discharged and is going to dialysis for treatment and antibiotics.

## 2017-10-05 NOTE — NURSING
Pt with temp of 100.4 post procedure( in cath lab). Pt to go to dialysis this pm for short run and will receive antibiotics at the end of dialysis per Dr. Munroe. Pt verbalizes understanding.

## 2017-10-05 NOTE — BRIEF OP NOTE
[facility]  Brief Operative Note     SUMMARY     Surgery Date: 10/5/2017     Surgeon(s) and Role:     * Baldev Munroe MD - Primary    Assisting Surgeon: None    Pre-op Diagnosis:  ESRD (end stage renal disease) [N18.6]    Post-op Diagnosis:  Post-Op Diagnosis Codes:     * ESRD (end stage renal disease) [N18.6]    Procedure(s) (LRB):  PERMCATH REWIRE- TUNNELED CATH REWIRE (N/A)    Anesthesia: alprazolam 1mg ODT prior to the procedure, 1% lidocaine    Description of the findings of the procedure:  Patient was prepped and draped in a sterile fashion.  This was a successful exchange of her L internal jugular tunneled dialysis catheter.  Patient tolerated the procedure well and no immediate complications noted.    Findings/Key Components: Patient was prepped and draped in a sterile fashion.  This was a successful exchange of her L internal jugular tunneled dialysis catheter.  Patient tolerated the procedure well and no immediate complications noted.      Estimated Blood Loss: < 10 mL        Specimens:   Specimen (12h ago through future)    None          Discharge Note    SUMMARY     Admit Date: 10/5/2017    Discharge Date and Time:  10/05/2017 3:42 PM    Hospital Course (synopsis of major diagnoses, care, treatment, and services provided during the course of the hospital stay): Patient was prepped and draped in a sterile fashion.  This was a successful exchange of her L internal jugular tunneled dialysis catheter.  Patient tolerated the procedure well and no immediate complications noted.       Final Diagnosis: Post-Op Diagnosis Codes:     * ESRD (end stage renal disease) [N18.6]    Disposition: Home or Self Care    Follow Up/Patient Instructions:     Medications:  Reconciled Home Medications:   Current Discharge Medication List      CONTINUE these medications which have NOT CHANGED    Details   amlodipine (NORVASC) 10 MG tablet 10 mg once daily.       atorvastatin (LIPITOR) 40 MG tablet Take 1  tablet (40 mg total) by mouth every evening.  Qty: 90 tablet, Refills: 1    Associated Diagnoses: Cerebral infarction due to thrombosis of cerebral artery      calcitRIOL (ROCALTROL) 0.5 MCG Cap Take 1 capsule by mouth 2 (two) times daily.       cinacalcet (SENSIPAR) 90 MG Tab Take 1 tablet by mouth once daily.       epoetin adonay 10,000 unit/mL Soln 1 mL, epoetin adonay 20,000 unit/mL Soln 1 mL Inject 30,000 Units into the vein every 14 (fourteen) days.      gabapentin (NEURONTIN) 300 MG capsule 3 CAPS PM,AM,NOON THEN 3 CAPS PM, 3CAPS AM & 2 NOON THEN INCREASE TO 3 CAPS 3 TIMES A DAY  Qty: 270 capsule, Refills: 2      HUMULIN R 100 unit/mL injection Inject 200 Units into the skin every evening.       hydrocodone-acetaminophen 7.5-325mg (NORCO) 7.5-325 mg per tablet Take 1 tablet by mouth every 4 to 6 hours as needed.  Refills: 0      lanthanum (FOSRENOL) 1000 MG chewable tablet Take 1,000 mg by mouth 3 (three) times daily with meals.        nateglinide (STARLIX) 120 MG tablet STARLIX 120MG 30 MINUTES BEFORE EACH MEAL.      ONETOUCH VERIO Strp USE 1 STRIP TWICE DAILY IN VITRO  Refills: 3      PLAVIX 75 mg tablet TAKE 1 BY MOUTH DAILY  Qty: 90 tablet, Refills: 0      vitamin renal formula, B-complex-vitamin c-folic acid, (B COMPLEX-C-FOLIC ACID) 1 mg Cap Take 1 capsule by mouth once daily. 1 Capsule Oral Every day      aspirin (ECOTRIN) 81 MG EC tablet Take 1 tablet (81 mg total) by mouth once daily.  Qty: 30 tablet, Refills: 12      oxycodone-acetaminophen (PERCOCET) 5-325 mg per tablet Take 1 tablet by mouth every 6 (six) hours as needed for Pain.  Qty: 10 tablet, Refills: 0      pregabalin (LYRICA) 50 MG capsule Take 1 capsule (50 mg total) by mouth 3 (three) times daily.  Qty: 90 capsule, Refills: 6             Discharge Procedure Orders  Activity as tolerated     Sponge bath only until clinic visit     Shower on day dressing removed (No bath)     Call MD for:  temperature >100.4     Call MD for:  severe  uncontrolled pain     Call MD for:  redness, tenderness, or signs of infection (pain, swelling, redness, odor or green/yellow discharge around incision site)     Call MD for:   Order Comments: Bleeding from the catheter site.       Follow-up Information     [facility].    Specialty:  Dialysis Center  Why:  for dialysis  Contact information:  [address] [address] [phone]

## 2017-10-05 NOTE — H&P
[facility]  History & Physical    Subjective:      Chief Complaint/Reason for Admission: Malfunctioning L internal jugular tunneled dialysis catheter    Jenni Todd is a 47 y.o. female with ESRD (now MWF at [facility]), had recent episodes of peritonitis which precluded her from being able to perform PD for now.  Transitioned to HD for the time being, had a catheter placed last week at [facility], was used temporarily there for infusion of medication, and when she showed up to dialysis unit yesterday, the catheter did not work at all, even after tPA dwell.    Past Medical History:   Diagnosis Date    Abnormal finding on Pap smear, HPV DNA positive     Anemia associated with chronic renal failure     on Epogen    Blood type B+     Bulging discs - symptomatic      CAD (coronary artery disease)     Diabetes mellitus, type 2     ESRD (end stage renal disease) 2004    FSGS (focal segmental glomerulosclerosis)     with collapsing    Hyperlipidemia     Hypertension     Neuropathy     Obesity     Secondary hyperparathyroidism, renal     TIA (transient ischemic attack)     Uterine fibroid     small uterine      Past Surgical History:   Procedure Laterality Date    CARDIAC CATHETERIZATION      PCI x 2     SECTION, CLASSIC      DIALYSIS FISTULA CREATION      multiple fistulas and grafts before PD     INCISION AND DRAINAGE OF WOUND Left     VULVAR ABCESS WITH NECROSIS    PERITONEAL CATHETER INSERTION      UMBILICAL HERNIA REPAIR       Family History   Problem Relation Age of Onset    Cancer Maternal Grandmother     Cancer Paternal Grandfather     Diabetes Maternal Aunt     Diabetes Paternal Aunt     Kidney disease Neg Hx     Heart disease Neg Hx     Ovarian cancer Neg Hx     Breast cancer Neg Hx      Social History   Substance Use Topics    Smoking status: Never Smoker    Smokeless tobacco: Never Used    Alcohol use No      Comment: Pt reports  some social use of about 1-2 drinks about every six months.       PTA Medications   Medication Sig    amlodipine (NORVASC) 10 MG tablet 10 mg once daily.     atorvastatin (LIPITOR) 40 MG tablet Take 1 tablet (40 mg total) by mouth every evening.    calcitRIOL (ROCALTROL) 0.5 MCG Cap Take 1 capsule by mouth 2 (two) times daily.     cinacalcet (SENSIPAR) 90 MG Tab Take 1 tablet by mouth once daily.     epoetin adonay 10,000 unit/mL Soln 1 mL, epoetin adonay 20,000 unit/mL Soln 1 mL Inject 30,000 Units into the vein every 14 (fourteen) days.    gabapentin (NEURONTIN) 300 MG capsule 3 CAPS PM,AM,NOON THEN 3 CAPS PM, 3CAPS AM & 2 NOON THEN INCREASE TO 3 CAPS 3 TIMES A DAY    HUMULIN R 100 unit/mL injection Inject 200 Units into the skin every evening.     hydrocodone-acetaminophen 7.5-325mg (NORCO) 7.5-325 mg per tablet Take 1 tablet by mouth every 4 to 6 hours as needed.    lanthanum (FOSRENOL) 1000 MG chewable tablet Take 1,000 mg by mouth 3 (three) times daily with meals.      nateglinide (STARLIX) 120 MG tablet STARLIX 120MG 30 MINUTES BEFORE EACH MEAL.    ONETOUCH VERIO Strp USE 1 STRIP TWICE DAILY IN VITRO    PLAVIX 75 mg tablet TAKE 1 BY MOUTH DAILY    vitamin renal formula, B-complex-vitamin c-folic acid, (B COMPLEX-C-FOLIC ACID) 1 mg Cap Take 1 capsule by mouth once daily. 1 Capsule Oral Every day    aspirin (ECOTRIN) 81 MG EC tablet Take 1 tablet (81 mg total) by mouth once daily.    oxycodone-acetaminophen (PERCOCET) 5-325 mg per tablet Take 1 tablet by mouth every 6 (six) hours as needed for Pain.    pregabalin (LYRICA) 50 MG capsule Take 1 capsule (50 mg total) by mouth 3 (three) times daily.     Review of patient's allergies indicates:   Allergen Reactions    Clindamycin Diarrhea    Flagyl [metronidazole hcl]         Review of Systems   Constitutional: Negative.    Respiratory: Negative.    Cardiovascular: Negative.        Objective:      Vital Signs (Most Recent)  Temp: 97.8 °F (36.6 °C)  (10/05/17 1143)  Pulse: 92 (10/05/17 1143)  Resp: 16 (10/05/17 1143)  BP: (!) 175/105 (10/05/17 1143)  SpO2: 100 % (10/05/17 1143)    Vital Signs Range (Last 24H):  Temp:  [97.8 °F (36.6 °C)]   Pulse:  [92]   Resp:  [16]   BP: (175)/(105)   SpO2:  [100 %]     Physical Exam   Constitutional: She is oriented to person, place, and time. She appears well-developed and well-nourished. No distress.   Tearful and mildly anxious.   HENT:   Head: Normocephalic and atraumatic.   Eyes: EOM are normal.   Cardiovascular:   L internal jugular tunneled dialysis catheter appears clean with no surrounding erythema or purulent drainage.   Pulmonary/Chest: Effort normal. No respiratory distress.   Neurological: She is alert and oriented to person, place, and time.   Skin: Skin is warm and dry. She is not diaphoretic.   Psychiatric: She has a normal mood and affect. Her behavior is normal.       Assessment:      Active Hospital Problems    Diagnosis  POA    Mechanical complications due to hemodialysis catheter [T82.49XA]  Yes      Resolved Hospital Problems    Diagnosis Date Resolved POA   No resolved problems to display.       Plan:      TDC rewire today.

## 2017-10-07 NOTE — PROCEDURES
[facility] Interventional Nephrology Service    Date Of Procedure: 10/06/2017    Procedure: Tunneled Dialysis Central Catheter Exchange    Indication: Malfunctioning catheter    Primary Surgeon: Baldev Munroe MD  Assist: none    Referring Provider: Dr. Watt and [facility]    Blood Loss: < 10 mL  Contrast Used: none    Procedure in Detail:  Patient prepped and draped in usual sterile fashion.  1% lidocaine used for local sedation to anesthetize soft tissues.  Blunt hemostat was used to dissect exit site and fibrin sheath around catheter and its cuff.  Catheter location was visualized under fluoroscopy and appeared to be in correct location, but the venous port would not aspirate.  Once the catheter and the cuff were freed, a guidewire was inserted through distal port into the central venous circulation (CVC).  This was done under fluoroscopic guidance.  Once wire was positioned to inferior vena cava, the old catheter was pulled while maintaining hemostasis as well as position of the wire in the CVC under fluoroscopy.  A new 23 cm palidrome catheter was then placed over the guidewire and confirmed in correct positioning under fluoroscopy.  Both ports flushed well.  The catheter was packed with 1000 units/mL heparin.  Suture was used to secure the catheter at the exit site and to anchor to patient's skin.      A total of 3 sutures were placed    Patient tolerated the procedure well and there were no complications      Baldev Munroe MD  [phone]

## 2017-11-09 ENCOUNTER — OFFICE VISIT (OUTPATIENT)
Dept: CARDIOLOGY | Facility: CLINIC | Age: 47
End: 2017-11-09
Payer: MEDICARE

## 2017-11-09 VITALS
HEART RATE: 99 BPM | HEIGHT: 64 IN | WEIGHT: 204 LBS | DIASTOLIC BLOOD PRESSURE: 84 MMHG | BODY MASS INDEX: 34.83 KG/M2 | SYSTOLIC BLOOD PRESSURE: 136 MMHG

## 2017-11-09 DIAGNOSIS — M54.16 LUMBAR RADICULOPATHY: ICD-10-CM

## 2017-11-09 DIAGNOSIS — I63.411 CEREBRAL INFARCTION DUE TO EMBOLISM OF RIGHT MIDDLE CEREBRAL ARTERY: ICD-10-CM

## 2017-11-09 DIAGNOSIS — N18.6 ESRD (END STAGE RENAL DISEASE): ICD-10-CM

## 2017-11-09 DIAGNOSIS — I15.2 HYPERTENSION ASSOCIATED WITH DIABETES: ICD-10-CM

## 2017-11-09 DIAGNOSIS — I25.10 CORONARY ARTERY DISEASE INVOLVING NATIVE CORONARY ARTERY OF NATIVE HEART WITHOUT ANGINA PECTORIS: Primary | ICD-10-CM

## 2017-11-09 DIAGNOSIS — D68.00 VON WILLEBRAND DISEASE: ICD-10-CM

## 2017-11-09 DIAGNOSIS — D68.51 FACTOR 5 LEIDEN MUTATION, HETEROZYGOUS: ICD-10-CM

## 2017-11-09 DIAGNOSIS — I73.9 PAD (PERIPHERAL ARTERY DISEASE): ICD-10-CM

## 2017-11-09 DIAGNOSIS — G45.3 AMAUROSIS FUGAX: ICD-10-CM

## 2017-11-09 DIAGNOSIS — I10 ESSENTIAL HYPERTENSION: ICD-10-CM

## 2017-11-09 DIAGNOSIS — E11.59 HYPERTENSION ASSOCIATED WITH DIABETES: ICD-10-CM

## 2017-11-09 PROCEDURE — 99999 PR PBB SHADOW E&M-EST. PATIENT-LVL III: CPT | Mod: PBBFAC,TXP,, | Performed by: INTERNAL MEDICINE

## 2017-11-09 PROCEDURE — 99213 OFFICE O/P EST LOW 20 MIN: CPT | Mod: PBBFAC,PO,TXP | Performed by: INTERNAL MEDICINE

## 2017-11-09 PROCEDURE — 99215 OFFICE O/P EST HI 40 MIN: CPT | Mod: S$PBB,NTX,, | Performed by: INTERNAL MEDICINE

## 2017-11-09 PROCEDURE — 93005 ELECTROCARDIOGRAM TRACING: CPT | Mod: PBBFAC,PO,NTX | Performed by: INTERNAL MEDICINE

## 2017-11-09 PROCEDURE — 93010 ELECTROCARDIOGRAM REPORT: CPT | Mod: S$PBB,NTX,, | Performed by: INTERNAL MEDICINE

## 2017-11-13 ENCOUNTER — HOSPITAL ENCOUNTER (OUTPATIENT)
Facility: OTHER | Age: 47
Discharge: HOME | End: 2017-11-13
Attending: INTERNAL MEDICINE | Admitting: INTERNAL MEDICINE
Payer: MEDICARE

## 2017-11-13 ENCOUNTER — SURGERY (OUTPATIENT)
Age: 47
End: 2017-11-13

## 2017-11-13 VITALS
WEIGHT: 210 LBS | SYSTOLIC BLOOD PRESSURE: 147 MMHG | RESPIRATION RATE: 18 BRPM | OXYGEN SATURATION: 100 % | HEIGHT: 67 IN | BODY MASS INDEX: 32.96 KG/M2 | HEART RATE: 94 BPM | TEMPERATURE: 98 F | DIASTOLIC BLOOD PRESSURE: 95 MMHG

## 2017-11-13 DIAGNOSIS — T82.590A MECHANICAL COMPLICATION OF ARTERIOVENOUS FISTULA SURGICALLY CREATED: ICD-10-CM

## 2017-11-13 DIAGNOSIS — T82.49XA COMPLICATIONS, DIALYSIS, CATHETER, MECHANICAL, INITIAL ENCOUNTER: Primary | ICD-10-CM

## 2017-11-13 LAB — POCT GLUCOSE: 227 MG/DL (ref 70–110)

## 2017-11-13 PROCEDURE — 25000003 PHARM REV CODE 250: Mod: TXP | Performed by: INTERNAL MEDICINE

## 2017-11-13 PROCEDURE — 25000003 PHARM REV CODE 250: Mod: TXP

## 2017-11-13 PROCEDURE — 63600175 PHARM REV CODE 636 W HCPCS: Mod: TXP | Performed by: INTERNAL MEDICINE

## 2017-11-13 PROCEDURE — C1769 GUIDE WIRE: HCPCS | Mod: TXP | Performed by: INTERNAL MEDICINE

## 2017-11-13 PROCEDURE — 63600175 PHARM REV CODE 636 W HCPCS: Mod: TXP

## 2017-11-13 PROCEDURE — 25500020 PHARM REV CODE 255: Mod: TXP

## 2017-11-13 PROCEDURE — 36558 INSERT TUNNELED CV CATH: CPT | Mod: TXP | Performed by: INTERNAL MEDICINE

## 2017-11-13 PROCEDURE — C1752 CATH,HEMODIALYSIS,SHORT-TERM: HCPCS | Mod: TXP | Performed by: INTERNAL MEDICINE

## 2017-11-13 RX ORDER — HEPARIN SOD,PORCINE/0.9 % NACL 1000/500ML
INTRAVENOUS SOLUTION INTRAVENOUS
Status: DISCONTINUED | OUTPATIENT
Start: 2017-11-13 | End: 2017-11-13 | Stop reason: HOSPADM

## 2017-11-13 RX ORDER — ALPRAZOLAM 0.5 MG/1
TABLET, ORALLY DISINTEGRATING ORAL
Status: DISCONTINUED | OUTPATIENT
Start: 2017-11-13 | End: 2017-11-13 | Stop reason: HOSPADM

## 2017-11-13 RX ORDER — MUPIROCIN 20 MG/G
OINTMENT TOPICAL
Status: DISCONTINUED | OUTPATIENT
Start: 2017-11-13 | End: 2017-11-13 | Stop reason: HOSPADM

## 2017-11-13 RX ORDER — HEPARIN SODIUM 1000 [USP'U]/ML
INJECTION, SOLUTION INTRAVENOUS; SUBCUTANEOUS
Status: DISCONTINUED | OUTPATIENT
Start: 2017-11-13 | End: 2017-11-13 | Stop reason: HOSPADM

## 2017-11-13 RX ORDER — LIDOCAINE HYDROCHLORIDE 10 MG/ML
1 INJECTION, SOLUTION EPIDURAL; INFILTRATION; INTRACAUDAL; PERINEURAL ONCE
Status: DISCONTINUED | OUTPATIENT
Start: 2017-11-13 | End: 2017-11-13 | Stop reason: HOSPADM

## 2017-11-13 RX ADMIN — ALPRAZOLAM 1 MG: 0.5 TABLET, ORALLY DISINTEGRATING ORAL at 09:11

## 2017-11-13 RX ADMIN — HEPARIN SODIUM 5000 UNITS: 1000 INJECTION, SOLUTION INTRAVENOUS; SUBCUTANEOUS at 10:11

## 2017-11-13 RX ADMIN — HEPARIN SODIUM IN SODIUM CHLORIDE 500 ML: 200 INJECTION INTRAVENOUS at 09:11

## 2017-11-13 NOTE — H&P
[facility]  History & Physical    Subjective:      Chief Complaint/Reason for Admission: Rewire of L IJ TDC    Jenni Todd is a 47 y.o. female with ESRD (MWF at [facility] with Dr. Watt) who presents with a dysfunctional L internal jugular TDC, which was rewired about a month ago.  It was working well until middle of last week, when the flows started worsening, and then on Friday, no blood was able to be aspirated, and tPA was used without success.      She had a procedure last week to have a PD catheter replaced, but she had some cardiac issues which precluded her from being able to go.  She now has to have a stress test done prior to having the PD catheter replaced.  She denies recent CP, SOB, fevers, chills.    Past Medical History:   Diagnosis Date    Abnormal finding on Pap smear, HPV DNA positive     Anemia associated with chronic renal failure     on Epogen    Blood type B+     Bulging discs - symptomatic      CAD (coronary artery disease)     Diabetes mellitus, type 2     ESRD (end stage renal disease) 2004    FSGS (focal segmental glomerulosclerosis)     with collapsing    Hyperlipidemia     Hypertension     Neuropathy     Obesity     Secondary hyperparathyroidism, renal     TIA (transient ischemic attack)     Uterine fibroid     small uterine      Past Surgical History:   Procedure Laterality Date    CARDIAC CATHETERIZATION      PCI x 2     SECTION, CLASSIC      DIALYSIS FISTULA CREATION      multiple fistulas and grafts before PD     INCISION AND DRAINAGE OF WOUND Left     VULVAR ABCESS WITH NECROSIS    PERITONEAL CATHETER INSERTION      UMBILICAL HERNIA REPAIR       Family History   Problem Relation Age of Onset    Cancer Maternal Grandmother     Cancer Paternal Grandfather     Diabetes Maternal Aunt     Diabetes Paternal Aunt     Kidney disease Neg Hx     Heart disease Neg Hx     Ovarian cancer Neg Hx     Breast  cancer Neg Hx      Social History   Substance Use Topics    Smoking status: Never Smoker    Smokeless tobacco: Never Used    Alcohol use No      Comment: Pt reports some social use of about 1-2 drinks about every six months.       PTA Medications   Medication Sig    amlodipine (NORVASC) 10 MG tablet 10 mg once daily.     atorvastatin (LIPITOR) 40 MG tablet Take 1 tablet (40 mg total) by mouth every evening.    calcitRIOL (ROCALTROL) 0.5 MCG Cap Take 1 capsule by mouth 2 (two) times daily.     cinacalcet (SENSIPAR) 90 MG Tab Take 1 tablet by mouth once daily.     epoetin adonay 10,000 unit/mL Soln 1 mL, epoetin adonay 20,000 unit/mL Soln 1 mL Inject 30,000 Units into the vein every 14 (fourteen) days.    gabapentin (NEURONTIN) 300 MG capsule 3 CAPS PM,AM,NOON THEN 3 CAPS PM, 3CAPS AM & 2 NOON THEN INCREASE TO 3 CAPS 3 TIMES A DAY    HUMULIN R 100 unit/mL injection Inject 200 Units into the skin every evening.     lanthanum (FOSRENOL) 1000 MG chewable tablet Take 1,000 mg by mouth 3 (three) times daily with meals.      vitamin renal formula, B-complex-vitamin c-folic acid, (B COMPLEX-C-FOLIC ACID) 1 mg Cap Take 1 capsule by mouth once daily. 1 Capsule Oral Every day    aspirin (ECOTRIN) 81 MG EC tablet Take 1 tablet (81 mg total) by mouth once daily.    hydrocodone-acetaminophen 7.5-325mg (NORCO) 7.5-325 mg per tablet Take 1 tablet by mouth every 4 to 6 hours as needed.    nateglinide (STARLIX) 120 MG tablet STARLIX 120MG 30 MINUTES BEFORE EACH MEAL.    ONETOUCH VERIO Strp USE 1 STRIP TWICE DAILY IN VITRO    oxycodone-acetaminophen (PERCOCET) 5-325 mg per tablet Take 1 tablet by mouth every 6 (six) hours as needed for Pain.    PLAVIX 75 mg tablet TAKE 1 BY MOUTH DAILY    pregabalin (LYRICA) 50 MG capsule Take 1 capsule (50 mg total) by mouth 3 (three) times daily.     Review of patient's allergies indicates:   Allergen Reactions    Clindamycin Diarrhea    Flagyl [metronidazole hcl]         Review of  Systems   Constitutional: Negative.    Respiratory: Negative.    Cardiovascular: Negative.        Objective:      Vital Signs (Most Recent)  Temp: 97.6 °F (36.4 °C) (11/13/17 0921)  Pulse: 89 (11/13/17 0921)  Resp: 18 (11/13/17 0921)  BP: (!) 164/99 (11/13/17 0921)  SpO2: 100 % (11/13/17 0921)    Vital Signs Range (Last 24H):  Temp:  [97.6 °F (36.4 °C)]   Pulse:  [89]   Resp:  [18]   BP: (164)/(99)   SpO2:  [100 %]     Physical Exam   Constitutional: She is oriented to person, place, and time. She appears well-developed and well-nourished. No distress.   HENT:   Head: Normocephalic and atraumatic.   Cardiovascular:   L internal jugular TDC clean without erythema or purulent drainage.   Pulmonary/Chest: Effort normal. No respiratory distress.   Neurological: She is alert and oriented to person, place, and time.   Skin: Skin is warm and dry. She is not diaphoretic.   Psychiatric: She has a normal mood and affect. Her behavior is normal.       Assessment:      Active Hospital Problems    Diagnosis  POA    Mechanical complication of arteriovenous fistula surgically created [T82.590A]  Yes      Resolved Hospital Problems    Diagnosis Date Resolved POA   No resolved problems to display.       Plan:      TDC rewire today.

## 2017-11-13 NOTE — DISCHARGE INSTRUCTIONS
Anesthesia: Monitored Anesthesia Care (MAC)    Anesthesia Safety  · Have an adult family member or friend drive you home after the procedure.  · For the first 24 hours after your surgery:  ¨ Do not drive or use heavy equipment.  ¨ Do not make important decisions or sign documents.  ¨ Avoid alcohol.  ¨ Have someone stay with you, if possible. They can watch for problems and help keep you safe.    PLEASE FOLLOW ANY OTHER INSTRUCTIONS PROVIDED TO YOU BY DR. MURRAY!

## 2017-11-13 NOTE — PLAN OF CARE
Jennihimanshu Todd has met all discharge criteria from Phase II. Vital Signs are stable, ambulating  without difficulty. Discharge instructions given, patient verbalized understanding. Discharged from facility via wheelchair in stable condition.

## 2017-11-13 NOTE — BRIEF OP NOTE
[facility]  Brief Operative Note     SUMMARY     Surgery Date: 11/13/2017     Surgeon(s) and Role:     * Baldev Munroe MD - Primary    Assisting Surgeon: None    Pre-op Diagnosis:  ESRD (end stage renal disease) on dialysis [N18.6, Z99.2]    Post-op Diagnosis:  Post-Op Diagnosis Codes:     * ESRD (end stage renal disease) on dialysis [N18.6, Z99.2]    Procedure(s) (LRB):  PERMCATH REWIRE- TUNNELED CATH REWIRE (Left)    Anesthesia: Local anesthesia    Description of the findings of the procedure: Patient was prepped and draped in a sterile fashion.  This was a successful exchange of her L internal jugular tunneled dialysis catheter.  Patient tolerated the procedure well and no immediate complications noted.    Findings/Key Components: Patient was prepped and draped in a sterile fashion.  This was a successful exchange of her L internal jugular tunneled dialysis catheter.  Patient tolerated the procedure well and no immediate complications noted.    Estimated Blood Loss: 15 mL         Specimens:   Specimen (12h ago through future)    None          Discharge Note    SUMMARY     Admit Date: 11/13/2017    Discharge Date and Time:  11/13/2017 10:49 AM    Hospital Course (synopsis of major diagnoses, care, treatment, and services provided during the course of the hospital stay): Patient was prepped and draped in a sterile fashion.  This was a successful exchange of her L internal jugular tunneled dialysis catheter.  Patient tolerated the procedure well and no immediate complications noted.       Final Diagnosis: Post-Op Diagnosis Codes:     * ESRD (end stage renal disease) on dialysis [N18.6, Z99.2]    Disposition: Home or Self Care    Follow Up/Patient Instructions:     Medications:  Reconciled Home Medications:   Current Discharge Medication List      CONTINUE these medications which have NOT CHANGED    Details   amlodipine (NORVASC) 10 MG tablet 10 mg once daily.       atorvastatin (LIPITOR) 40 MG  tablet Take 1 tablet (40 mg total) by mouth every evening.  Qty: 90 tablet, Refills: 1    Associated Diagnoses: Cerebral infarction due to thrombosis of cerebral artery      calcitRIOL (ROCALTROL) 0.5 MCG Cap Take 1 capsule by mouth 2 (two) times daily.       cinacalcet (SENSIPAR) 90 MG Tab Take 1 tablet by mouth once daily.       epoetin adonay 10,000 unit/mL Soln 1 mL, epoetin adonay 20,000 unit/mL Soln 1 mL Inject 30,000 Units into the vein every 14 (fourteen) days.      gabapentin (NEURONTIN) 300 MG capsule 3 CAPS PM,AM,NOON THEN 3 CAPS PM, 3CAPS AM & 2 NOON THEN INCREASE TO 3 CAPS 3 TIMES A DAY  Qty: 270 capsule, Refills: 2      HUMULIN R 100 unit/mL injection Inject 200 Units into the skin every evening.       lanthanum (FOSRENOL) 1000 MG chewable tablet Take 1,000 mg by mouth 3 (three) times daily with meals.        vitamin renal formula, B-complex-vitamin c-folic acid, (B COMPLEX-C-FOLIC ACID) 1 mg Cap Take 1 capsule by mouth once daily. 1 Capsule Oral Every day      aspirin (ECOTRIN) 81 MG EC tablet Take 1 tablet (81 mg total) by mouth once daily.  Qty: 30 tablet, Refills: 12      hydrocodone-acetaminophen 7.5-325mg (NORCO) 7.5-325 mg per tablet Take 1 tablet by mouth every 4 to 6 hours as needed.  Refills: 0      nateglinide (STARLIX) 120 MG tablet STARLIX 120MG 30 MINUTES BEFORE EACH MEAL.      ONETOUCH VERIO Strp USE 1 STRIP TWICE DAILY IN VITRO  Refills: 3      oxycodone-acetaminophen (PERCOCET) 5-325 mg per tablet Take 1 tablet by mouth every 6 (six) hours as needed for Pain.  Qty: 10 tablet, Refills: 0      PLAVIX 75 mg tablet TAKE 1 BY MOUTH DAILY  Qty: 90 tablet, Refills: 0      pregabalin (LYRICA) 50 MG capsule Take 1 capsule (50 mg total) by mouth 3 (three) times daily.  Qty: 90 capsule, Refills: 6             Discharge Procedure Orders  Activity as tolerated     Sponge bath only until clinic visit     Call MD for:  temperature >100.4     Call MD for:  severe uncontrolled pain     Call MD for:   redness, tenderness, or signs of infection (pain, swelling, redness, odor or green/yellow discharge around incision site)     Call MD for:   Order Comments: Bleeding from the catheter site.       Follow-up Information     [facility].    Specialty:  Dialysis Center  Why:  for dialysis  Contact information:  [address] [facility] [address] [phone]

## 2017-11-13 NOTE — PROCEDURES
[facility] Interventional Nephrology Service    Date Of Procedure: 11/13/2017    Procedure: Tunneled Dialysis Central Catheter Exchange    Indication: Nonfunctioning L internal jugular tunneled dialysis catheter    Primary Surgeon: Baldev Munroe MD  Assist: none    Referring Provider: Dr. Watt    Blood Loss: 15 mL  Contrast Used: none    Procedure in Detail:  Patient prepped and draped in usual sterile fashion.  1% lidocaine was used for local sedation to anesthetize soft tissues.  Blunt hemostat was used to dissect exit site and fibrin sheath around catheter and its cuff.  Catheter location was visualized under fluoroscopy and was noted to be in the wrong position, the tip lying above the cavoatrial junction near the start of the SVC.  Once the catheter and the cuff were freed, a guidewire was inserted through distal port into the central venous circulation (CVC).  This was done under fluoroscopic guidance.  Once wire was positioned to inferior vena cava, the old catheter was pulled while maintaining hemostasis as well as position of the wire in the CVC under fluoroscopy.  A new 28 cm palindrome catheter was then placed over the guidewire and confirmed in correct positioning under fluoroscopy.  Both ports flushed well.  The catheter was packed with 1000 units/mL heparin.  Suture was used to secure the catheter at the exit site and to anchor to patient's skin.      A total of 3 sutures were placed    Patient tolerated the procedure well and there were no complications      Baldev Munroe MD  [phone]

## 2017-11-14 RX ORDER — CILOSTAZOL 50 MG/1
50 TABLET ORAL 2 TIMES DAILY
Qty: 180 TABLET | Refills: 3 | Status: ON HOLD | OUTPATIENT
Start: 2017-11-14 | End: 2018-01-01 | Stop reason: HOSPADM

## 2017-11-14 NOTE — PROGRESS NOTES
Subjective:   Patient ID:  Jenni Todd is a 47 y.o. female who presents for evaluation of Coronary Artery Disease; Hyperlipidemia; Hypertension; and Peripheral Arterial Disease      HPI:       She is here today for surgical clearance for placement of PD catheter. Recently a surgical attempt for placement of PD catheter at [facility] was aborted because of hypotension after induction. This could be related to low volume status post HD. In view of her history of CAD, anesthesia asked for cardiac clearance. She denies any angina or dyspnea with activities. Her activities are limited due to limb pain. She uses a walker.     She has CAD s/p PCI of RCA for NSTEMI in 2012 with two BMS 3.0 x 26 and 2.5 x 18 mm. She is on aspirin and plavix for DAPT. Normal EF. ESRD on HD temporarily while waiting for another PD catheter placement, HTN, HLP, DM, Obesity, and PAD.         TINO 11/2016: medial calcinosis with monophasic waveforms      Arterial ultrasound 11/2016:   PSV in cm/sec      R ,pSFA 183, mSFA 220, dSFA 68, POP 51, PT 70, AT 66, and DP 24  L CFA  107,pSFA 139, mSFA 183, dSFA 99, , PT 18, AT 63, and DP 28      Echo 11/2016:      1 - Normal left ventricular systolic function (EF 60-65%).     2 - Normal left ventricular diastolic function.     3 - Normal right ventricular systolic function .     4 - Trivial to mild aortic stenosis, ARNOLD = 1.95 cm2, peak velocity = 2.1 m/s, mean gradient = 10.0 mmHg.     5 - Mild mitral regurgitation.             CT scan from 5/2016   Non contrast study   There was calcification of distal aorta and bilateral common iliac   No significant calcification of external iliac arteries       Patient Active Problem List    Diagnosis Date Noted    Headache     Dizziness     Type 2 diabetes 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     H/O: CVA (cerebrovascular accident)     TIA (transient ischemic attack) 02/24/2017    CVA (cerebral vascular accident) 02/23/2017     Volume depletion 02/22/2017    Dehydration 02/22/2017    PAD (peripheral artery disease) 10/24/2016    Essential hypertension     Coronary artery disease involving native coronary artery of native heart without angina pectoris     Type 2 diabetes mellitus with diabetic chronic kidney disease     Vulvar abscess 04/15/2016    Gait abnormality 12/23/2015    Chronic low back pain 12/23/2015    DDD (degenerative disc disease), lumbar 12/23/2015    Candida albicans infection 09/14/2015    Lumbar radiculopathy 08/13/2015    Cerebral infarction due to embolism of middle cerebral artery 08/13/2015    Difficulty walking 08/03/2015    Thrombocytosis 07/14/2015    Hypoalbuminemia 07/14/2015    Hypomagnesemia 07/14/2015    Factor 5 Leiden mutation, heterozygous 07/14/2015    Von Willebrand disease 07/14/2015    Hypertension associated with diabetes 07/09/2015    Cerebellar stroke 07/07/2015    Generalized muscle weakness 06/24/2015    Sacral back pain 06/02/2015    Fall at home 06/02/2015    Hypertensive renal disease 12/10/2014    Spinal stenosis of sacral and sacrococcygeal region 07/23/2014    Bulging discs - symptomatic  05/16/2014    Persistent headaches 05/06/2014    Awaiting organ transplant status 02/19/2013    Neuropathy     FSGS (focal segmental glomerulosclerosis) biopsy proven with collapsing features     Anemia associated with chronic renal failure      on Epogen      Hypertension     Hyperlipidemia     Obesity     CAD (coronary artery disease) with recent PCI 12/18/2012      Cath 12/27/2012:   RCA PCI 2.5 x 18 and 3.0 x 26 mm BMS   Patent LAD and LCX   Normal EF     Dr. Berrios for NSTEMI       DAPT score 3         Diabetes mellitus, type 2     ESRD (end stage renal disease) on PD ( initiated dialysis 04/20/2004) 04/20/2004     Nightly PD                      LABS    LAST HbA1c  Lab Results   Component Value Date    HGBA1C 9.0 (H) 02/22/2017       Lipid panel  Lab Results    Component Value Date    CHOL 199 02/24/2017    CHOL 124 07/06/2015    CHOL 245 (H) 05/07/2014     Lab Results   Component Value Date    HDL 19 (L) 02/24/2017    HDL 25 (L) 07/06/2015    HDL 25 (L) 05/07/2014     Lab Results   Component Value Date    LDLCALC Invalid, Trig>400.0 02/24/2017    LDLCALC 75.6 07/06/2015    LDLCALC Invalid, Trig>400.0 05/07/2014     Lab Results   Component Value Date    TRIG 460 (H) 02/24/2017    TRIG 117 07/06/2015    TRIG 557 (H) 05/07/2014     Lab Results   Component Value Date    CHOLHDL 9.5 (L) 02/24/2017    CHOLHDL 20.2 07/06/2015    CHOLHDL 10.2 (L) 05/07/2014            Review of Systems   Constitution: Negative for diaphoresis, weakness, night sweats, weight gain and weight loss.   HENT: Negative for congestion.    Eyes: Negative for blurred vision, discharge and double vision.   Cardiovascular: Positive for claudication. Negative for chest pain, cyanosis, dyspnea on exertion, irregular heartbeat, leg swelling, near-syncope, orthopnea, palpitations, paroxysmal nocturnal dyspnea and syncope.   Respiratory: Negative for cough, shortness of breath and wheezing.    Endocrine: Negative for cold intolerance, heat intolerance and polyphagia.   Hematologic/Lymphatic: Negative for adenopathy and bleeding problem. Does not bruise/bleed easily.   Skin: Negative for dry skin and nail changes.   Musculoskeletal: Negative for arthritis, back pain, falls, joint pain, myalgias and neck pain.   Gastrointestinal: Negative for bloating, abdominal pain, change in bowel habit and constipation.   Genitourinary: Negative for bladder incontinence, dysuria, flank pain, genital sores and missed menses.   Neurological: Negative for aphonia, brief paralysis, difficulty with concentration and dizziness.   Psychiatric/Behavioral: Negative for altered mental status and memory loss. The patient does not have insomnia.    Allergic/Immunologic: Negative for environmental allergies.       Objective:   Physical  Exam   Constitutional: She is oriented to person, place, and time. She appears well-developed and well-nourished. She is not intubated.   HENT:   Head: Normocephalic and atraumatic.   Right Ear: External ear normal.   Left Ear: External ear normal.   Mouth/Throat: Oropharynx is clear and moist.   Eyes: Conjunctivae and EOM are normal. Pupils are equal, round, and reactive to light. Right eye exhibits no discharge. Left eye exhibits no discharge. No scleral icterus.   Neck: Normal range of motion. Neck supple. Normal carotid pulses, no hepatojugular reflux and no JVD present. Carotid bruit is not present. No tracheal deviation present. No thyromegaly present.   Cardiovascular: Normal rate, regular rhythm, S1 normal and S2 normal.   No extrasystoles are present. PMI is not displaced.  Exam reveals no gallop, no S3, no distant heart sounds, no friction rub and no midsystolic click.    Murmur heard.   Medium-pitched systolic murmur is present  at the upper right sternal border radiating to the neck  Pulses:       Carotid pulses are 2+ on the right side with bruit, and 2+ on the left side with bruit.       Radial pulses are 2+ on the right side, and 2+ on the left side.        Femoral pulses are 2+ on the right side, and 2+ on the left side.       Popliteal pulses are 2+ on the right side, and 2+ on the left side.        Dorsalis pedis pulses are 0 on the right side, and 0 on the left side.        Posterior tibial pulses are 0 on the right side, and 0 on the left side.       Biphasic R DP and monophasic R PT doppler signals  Biphasic L DP and PT doppler signals   Pulmonary/Chest: Effort normal and breath sounds normal. No accessory muscle usage or stridor. No apnea, no tachypnea and no bradypnea. She is not intubated. No respiratory distress. She has no decreased breath sounds. She has no wheezes. She has no rales. She exhibits no tenderness and no bony tenderness.   Abdominal: She exhibits no distension, no pulsatile  liver, no abdominal bruit, no ascites, no pulsatile midline mass and no mass. There is no tenderness. There is no rebound and no guarding.   Musculoskeletal: Normal range of motion. She exhibits no edema or tenderness.   Lymphadenopathy:     She has no cervical adenopathy.   Neurological: She is alert and oriented to person, place, and time. She has normal reflexes. No cranial nerve deficit. Coordination normal.   Skin: Skin is warm. No rash noted. No erythema. No pallor.   Psychiatric: She has a normal mood and affect. Her behavior is normal. Judgment and thought content normal.   Vitals reviewed.      Assessment:     1. Coronary artery disease involving native coronary artery of native heart without angina pectoris    2. Essential hypertension    3. PAD (peripheral artery disease)    4. Hypertension associated with diabetes    5. ESRD (end stage renal disease) on PD ( initiated dialysis 04/20/2004)    6. Factor 5 Leiden mutation, heterozygous    7. Von Willebrand disease    8. Amaurosis fugax    9. Lumbar radiculopathy    10. Cerebral infarction due to embolism of right middle cerebral artery              Plan:             Will obtain an echo and stress test to rule out ischemia prior to surgery  Phone review after tests      Regarding her limb and PAD   Her ABIs were inconclusive due to medial calcinosis   Her ultrasound revealed changes in velocities with stenosis >50%   She is willing to proceed with angiography in the early part of 2018         Continue with current medical plan and lifestyle changes.  Return sooner for concerns or questions. If symptoms persist go to the ED  I have reviewed all pertinent data on this patient         Consider pletal for medical therapy of PAD        Aspirin  Statin  Arb/acei      Weight loss  Exercise  Treat neuropathy          I have reviewed the patient's medical history in detail and updated the computerized patient record.    Orders Placed This Encounter   Procedures     NM Myocardial Perfusion Spect Multi Pharmacologic     Standing Status:   Future     Standing Expiration Date:   11/9/2018     Order Specific Question:   Stress Medication to use:     Answer:   Regadenoson    NM Multi Study RX Stress Card Component     Standing Status:   Future     Standing Expiration Date:   11/9/2018     Order Specific Question:   Which medicaton for the stress procedure?     Answer:   Regadenoson    EKG 12-lead       Follow up as scheduled. Return sooner for concerns or questions  Follow up in 4 months         She expressed verbal understanding and agreed with the plan        Greater than 50% of the visit of 45 minutes was spent counseling, educating, and coordinating the care of the patient.  -In today's visit, at least 4 established conditions that pose a risk to life or bodily function have been addressed and the conditions are severe.    -In today's visit, monitoring for drug toxicity was accomplished.                Patient's Medications   New Prescriptions    CILOSTAZOL (PLETAL) 50 MG TAB    Take 1 tablet (50 mg total) by mouth 2 (two) times daily.   Previous Medications    AMLODIPINE (NORVASC) 10 MG TABLET    10 mg once daily.     ASPIRIN (ECOTRIN) 81 MG EC TABLET    Take 1 tablet (81 mg total) by mouth once daily.    ATORVASTATIN (LIPITOR) 40 MG TABLET    Take 1 tablet (40 mg total) by mouth every evening.    CALCITRIOL (ROCALTROL) 0.5 MCG CAP    Take 1 capsule by mouth 2 (two) times daily.     CINACALCET (SENSIPAR) 90 MG TAB    Take 1 tablet by mouth once daily.     EPOETIN JOSEFA 10,000 UNIT/ML SOLN 1 ML, EPOETIN JOSEFA 20,000 UNIT/ML SOLN 1 ML    Inject 30,000 Units into the vein every 14 (fourteen) days.    GABAPENTIN (NEURONTIN) 300 MG CAPSULE    3 CAPS PM,AM,NOON THEN 3 CAPS PM, 3CAPS AM & 2 NOON THEN INCREASE TO 3 CAPS 3 TIMES A DAY    HUMULIN R 100 UNIT/ML INJECTION    Inject 200 Units into the skin every evening.     HYDROCODONE-ACETAMINOPHEN 7.5-325MG (NORCO) 7.5-325 MG PER TABLET     Take 1 tablet by mouth every 4 to 6 hours as needed.    LANTHANUM (FOSRENOL) 1000 MG CHEWABLE TABLET    Take 1,000 mg by mouth 3 (three) times daily with meals.      NATEGLINIDE (STARLIX) 120 MG TABLET    STARLIX 120MG 30 MINUTES BEFORE EACH MEAL.    ONETOUCH VERIO STRP    USE 1 STRIP TWICE DAILY IN VITRO    OXYCODONE-ACETAMINOPHEN (PERCOCET) 5-325 MG PER TABLET    Take 1 tablet by mouth every 6 (six) hours as needed for Pain.    PLAVIX 75 MG TABLET    TAKE 1 BY MOUTH DAILY    PREGABALIN (LYRICA) 50 MG CAPSULE    Take 1 capsule (50 mg total) by mouth 3 (three) times daily.    VITAMIN RENAL FORMULA, B-COMPLEX-VITAMIN C-FOLIC ACID, (B COMPLEX-C-FOLIC ACID) 1 MG CAP    Take 1 capsule by mouth once daily. 1 Capsule Oral Every day   Modified Medications    No medications on file   Discontinued Medications    No medications on file

## 2017-11-14 NOTE — PATIENT INSTRUCTIONS
Heart Disease Education    The heart beats 60 to 100 times per minute, 24 hours a day. This equals almost 1000,000 times a day. It pumps blood with oxygen and nutrients to the tissues and organs of the body. But the heart is a muscle and needs its own supply of blood. Blood flow to the heart is supplied by the coronary arteries. Coronary artery disease (atherosclerosis) is a result of cholesterol, saturated fat, and calcium deposits (plaques) that build up inside the walls. This causes inflammation within the coronary arteries. These plaques narrow the artery and reduce blood flow to the heart muscle. The reduction in blood flow to the heart muscle decreases oxygen supply to the heart. If the narrowing is significant enough, the oxygen supply to one or more regions of the heart can be temporarily or permanently shut down. This can cause chest pain, and possibly death of heart tissue (heart attack).  Types of chest pain  Angina is the name for pain in the heart muscle. Angina is a warning sign of serious heart disease. When untreated it can lead to a heart attack, also known as acute myocardial infarction, or AMI. Angina occurs when there is not enough blood and oxygen flowing to the heart for the amount of work it is doing. This most often happens during physical exertion, when the heart is working hardest. It is usually relieved by rest or nitroglycerin. Angina may also occur after a large meal when extra blood is sent to the digestive organs and less goes to the heart. In the case of advanced or unstable heart disease, angina can occur at rest or awaken you from sleep. Angina usually lasts from a few minutes up to 20 minutes or more. When treated early, the effects of angina can be reversed without permanent damage to the heart. Angina is a serious condition and needs to be evaluated by a medical professional immediately.  There are two types of angina -- stable and unstable:  · Stable angina usually occurs  with a predictable level of activity. Being stable, its character, severity, and occurrence do not change much over time. It usually starts with activity, and resolves with rest or taking your medicine as instructed by your doctor. The symptoms usually do not last long.  · Unstable angina changes or gets worse over time. It is different from whatever you are used to. It may feel different or worse, begin without cause, occur with exercise or exertion, wake you up from sleep, and last longer. It may not respond in the same way as it does when you take your usual medicines for an attack. This type of angina can be a warning sign of an impending heart attack.     A heart attack is usually the result of a blood clot that suddenly forms in a coronary artery that has been narrowed with plaque. When this occurs, blood flow may be cut off to a part of the heart muscle, causing the cells to die. This weakens the pumping action of the heart, which affects the delivery of blood to all the other organs in the body including the brain. This damage is not reversible. However, early treatment can limit the amount of damage.  The pain you feel with angina and a heart attack may have a similar quality. However, it is usually different in intensity and duration. Here are some typical descriptions of a heart attack:  · It is most often experienced as a squeezing, crushing, pressure-like sensation in the center of the chest.  · It is sometimes described as something heavy sitting on my chest.  · It may feel more like a bad case of indigestion.  · The pain may spread from the chest to the arm, shoulder, throat or jaw.  · Sometimes the pain is not felt in the chest at all, but only in the arm, shoulder, throat or jaw.  · There may also be nausea, vomiting, dizziness or light-headedness, sweating and trouble breathing.  · Palpitations, or your heart beating rapidly  · A new, irregular heart beat  · Unexplained weakness  You may not be  "able to tell the difference between "bad" angina and a heart attack at home. Seek help if your symptoms are different than usual. Do not be in denial or just try to "tough it out."  Call 911  This is the fastest and safest way to get to the emergency department. The paramedics can also start treatment on the way to the hospital, saving valuable time for your heart.  · If the angina gets worse, if it continues, or if it stops and returns, call 911 immediately. Do not delay. You may be having a heart attack.  · After you call 911, take a second tablet or spray unless instructed otherwise. When repeating doses, sit down if possible, because it can make you feel lightheaded or dizzy. Wait another 5 minutes. If the angina still does not go away, take a third tablet or spray. Do not take more than 3 tablets or sprays within 15 minutes. Stay on the phone with 911 for further instruction.  · Your healthcare provider may give you slightly different instructions than those above. If so, follow them carefully.  Do not wait until symptoms become severe to call 911.  Other reasons to call 911 include:  · Trouble breathing  · Feeling lightheaded, faint, or dizzy  · Rapid heart beat  · Slower than usual heart rate compared to your normal  · Angina with weakness, dizziness, fainting, heavy sweating, nausea, or vomiting  · Extreme drowsiness, confusion  · Weakness of an arm or leg or one side of the face  · Difficulty with speech or vision  When to seek medical care  Remember, the signs and symptoms of a heart attack are not always like they are on TV. Sometimes they are not so obvious. You may only feel weak, or just not right. If it is not clear or if you have any doubt, call for advice.  · Seek help if there is a change in the type of pain, if it feels different, or if your symptoms are mild.  · Do not drive yourself. Have someone else drive you. If no one can drive, call 911.  · Do not delay. Fast diagnosis and treatment can " "prevent or limit the amount of heart damage during a heart attack.  · Do not go to your doctor's office or a clinic as they may not be able to provide all the testing and treatment required for this condition.  · If your doctor has given you medicine to take when symptoms occur, take them but don't delay getting help trying to locate medicines.  What happens in the emergency department  The emergency department is connected to your local emergency medical system (EMS) through 911. That's why during a cardiac emergency, calling 911 is the fastest way to get help. The goal of the emergency department is to rapidly screen, evaluate, and treat people.  Once you are there, an electrocardiogram (ECG or heart tracing) will be done. Blood samples may be taken to look for the presence of heart enzymes that leak from damaged heart cells and show if a heart attack is occurring. You will often be evaluated by a heart specialist (cardiologist) who decides the best course of action. In the case of severe angina or early heart attack, and depending on the circumstances, powerful "clot busting" medicines can be used to dissolve blood clots in the coronary artery. In other cases, you may be taken to a cardiac catheterization lab. Here, a tiny balloon-tipped catheter is advanced through blood vessels to the heart. There the balloon is inflated pushing open the blood vessel restoring blood flow.  Risk factors for heart disease  Risk factors for heart disease are a combination of genetic and lifestyle. Many risk factors work by either directly or indirectly damaging the blood vessels of the heart, or by increasing the risk of forming blood or cholesterol clots, which then clog up and block the arteries.     Examples of physical lifestyle risk factors:  · Cigarette smoking  · High blood pressure  · High blood cholesterol  · Use of stimulant drugs such as cocaine, crack, and amphetamines  · Eating a high-fat, high-cholesterol " meal  · Diabetes   · Obesity which increases risk for diabetes and high blood pressure  · Lack of regular physical activity     Examples of emotional lifestyle factors:  · Chronic high stress levels release stress hormones. These raise blood pressure and cholesterol level and makes blood clot more easily.  · Held-in anger, hostile or cynical attitude  · Social and emotional isolation, lack of intimacy  · Loss of relationship  · Depression  Other factors that increase the risk of heart attack that you cannot control :  · Age. The older you get beyond 40, the greater is your risk of significant coronary artery disease.  · Gender. More men than women get heart disease; but once past menopause, women who are not taking estrogen replacement have the same risk as men for a heart attack.  · Family history. If your mother, father, brother or sister has coronary artery disease, your risk of having it is higher than a person your age without this family history.  What can you do to decrease your risk  To reduce your risk of heart disease:  · Get regular checkups with your doctor.  · Take your medicines for blood pressure, cholesterol or diabetes as directed.  · Watch your diet. Eat a heart healthy diet choosing fresh foods, less salt, cholesterol, and fat  · Stop smoking. Get help if needed.  · Get regular exercise.  · Manage stress.  · Carry a list of medicines and doses in your wallet.  Date Last Reviewed: 12/30/2015  © 1525-1714 [employer]. [address] [place] [address], [place] [address]. All rights reserved. This information is not intended as a substitute for professional medical care. Always follow your healthcare professional's instructions.

## 2017-11-29 ENCOUNTER — HOSPITAL ENCOUNTER (INPATIENT)
Facility: OTHER | Age: 47
LOS: 2 days | Discharge: HOME | DRG: 193 | End: 2017-12-03
Attending: EMERGENCY MEDICINE | Admitting: HOSPITALIST
Payer: MEDICARE

## 2017-11-29 ENCOUNTER — HOSPITAL ENCOUNTER (OUTPATIENT)
Dept: CARDIOLOGY | Facility: HOSPITAL | Age: 47
Discharge: HOME | End: 2017-11-29
Attending: INTERNAL MEDICINE
Payer: MEDICARE

## 2017-11-29 ENCOUNTER — HOSPITAL ENCOUNTER (OUTPATIENT)
Dept: RADIOLOGY | Facility: HOSPITAL | Age: 47
Discharge: HOME | End: 2017-11-29
Attending: INTERNAL MEDICINE
Payer: MEDICARE

## 2017-11-29 DIAGNOSIS — R05.9 COUGH: ICD-10-CM

## 2017-11-29 DIAGNOSIS — N18.9 ANEMIA ASSOCIATED WITH CHRONIC RENAL FAILURE: ICD-10-CM

## 2017-11-29 DIAGNOSIS — Z99.2 TYPE 2 DIABETES MELLITUS WITH CHRONIC KIDNEY DISEASE ON CHRONIC DIALYSIS, UNSPECIFIED LONG TERM INSULIN USE STATUS: ICD-10-CM

## 2017-11-29 DIAGNOSIS — Z99.2 ESRD ON DIALYSIS: ICD-10-CM

## 2017-11-29 DIAGNOSIS — I25.10 CORONARY ARTERY DISEASE INVOLVING NATIVE CORONARY ARTERY OF NATIVE HEART WITHOUT ANGINA PECTORIS: ICD-10-CM

## 2017-11-29 DIAGNOSIS — J96.01 ACUTE RESPIRATORY FAILURE WITH HYPOXIA AND HYPERCARBIA: ICD-10-CM

## 2017-11-29 DIAGNOSIS — J18.9 PNEUMONIA OF RIGHT UPPER LOBE DUE TO INFECTIOUS ORGANISM: Primary | ICD-10-CM

## 2017-11-29 DIAGNOSIS — E11.22 TYPE 2 DIABETES MELLITUS WITH CHRONIC KIDNEY DISEASE ON CHRONIC DIALYSIS, UNSPECIFIED LONG TERM INSULIN USE STATUS: ICD-10-CM

## 2017-11-29 DIAGNOSIS — D63.1 ANEMIA ASSOCIATED WITH CHRONIC RENAL FAILURE: ICD-10-CM

## 2017-11-29 DIAGNOSIS — J96.02 ACUTE RESPIRATORY FAILURE WITH HYPOXIA AND HYPERCARBIA: ICD-10-CM

## 2017-11-29 DIAGNOSIS — E83.52 HYPERCALCEMIA: ICD-10-CM

## 2017-11-29 DIAGNOSIS — N18.6 ESRD ON DIALYSIS: ICD-10-CM

## 2017-11-29 DIAGNOSIS — D64.9 ANEMIA, UNSPECIFIED TYPE: ICD-10-CM

## 2017-11-29 DIAGNOSIS — N18.6 TYPE 2 DIABETES MELLITUS WITH CHRONIC KIDNEY DISEASE ON CHRONIC DIALYSIS, UNSPECIFIED LONG TERM INSULIN USE STATUS: ICD-10-CM

## 2017-11-29 DIAGNOSIS — E78.00 PURE HYPERCHOLESTEROLEMIA: ICD-10-CM

## 2017-11-29 LAB
ALBUMIN SERPL BCP-MCNC: 3.3 G/DL
ALP SERPL-CCNC: 124 U/L
ALT SERPL W/O P-5'-P-CCNC: 14 U/L
ANION GAP SERPL CALC-SCNC: 17 MMOL/L
AST SERPL-CCNC: 23 U/L
BASOPHILS # BLD AUTO: 0.01 K/UL
BASOPHILS NFR BLD: 0.1 %
BILIRUB SERPL-MCNC: 0.4 MG/DL
BUN SERPL-MCNC: 15 MG/DL
CALCIUM SERPL-MCNC: 10.8 MG/DL
CHLORIDE SERPL-SCNC: 95 MMOL/L
CO2 SERPL-SCNC: 28 MMOL/L
CREAT SERPL-MCNC: 4.7 MG/DL
DEPRECATED S PYO AG THROAT QL EIA: NEGATIVE
DIASTOLIC DYSFUNCTION: NO
DIFFERENTIAL METHOD: ABNORMAL
EOSINOPHIL # BLD AUTO: 0.2 K/UL
EOSINOPHIL NFR BLD: 2.7 %
ERYTHROCYTE [DISTWIDTH] IN BLOOD BY AUTOMATED COUNT: 14 %
EST. GFR  (AFRICAN AMERICAN): 12 ML/MIN/1.73 M^2
EST. GFR  (NON AFRICAN AMERICAN): 10 ML/MIN/1.73 M^2
FLUAV AG SPEC QL IA: NEGATIVE
FLUBV AG SPEC QL IA: NEGATIVE
GLUCOSE SERPL-MCNC: 218 MG/DL
HCT VFR BLD AUTO: 26.3 %
HGB BLD-MCNC: 8.7 G/DL
LYMPHOCYTES # BLD AUTO: 2.1 K/UL
LYMPHOCYTES NFR BLD: 28.3 %
MCH RBC QN AUTO: 32.7 PG
MCHC RBC AUTO-ENTMCNC: 33.1 G/DL
MCV RBC AUTO: 99 FL
MONOCYTES # BLD AUTO: 0.8 K/UL
MONOCYTES NFR BLD: 10.5 %
NEUTROPHILS # BLD AUTO: 4.3 K/UL
NEUTROPHILS NFR BLD: 58.3 %
PLATELET # BLD AUTO: 259 K/UL
PMV BLD AUTO: 10.4 FL
POTASSIUM SERPL-SCNC: 3.8 MMOL/L
PROT SERPL-MCNC: 8.1 G/DL
RBC # BLD AUTO: 2.66 M/UL
SODIUM SERPL-SCNC: 140 MMOL/L
SPECIMEN SOURCE: NORMAL
WBC # BLD AUTO: 7.41 K/UL

## 2017-11-29 PROCEDURE — A9502 TC99M TETROFOSMIN: HCPCS | Mod: TXP

## 2017-11-29 PROCEDURE — 87400 INFLUENZA A/B EACH AG IA: CPT | Mod: NTX

## 2017-11-29 PROCEDURE — 87081 CULTURE SCREEN ONLY: CPT | Mod: NTX

## 2017-11-29 PROCEDURE — 93016 CV STRESS TEST SUPVJ ONLY: CPT | Mod: NTX,,, | Performed by: INTERNAL MEDICINE

## 2017-11-29 PROCEDURE — 63600175 PHARM REV CODE 636 W HCPCS: Mod: NTX | Performed by: EMERGENCY MEDICINE

## 2017-11-29 PROCEDURE — 96365 THER/PROPH/DIAG IV INF INIT: CPT | Mod: NTX

## 2017-11-29 PROCEDURE — 85025 COMPLETE CBC W/AUTO DIFF WBC: CPT | Mod: NTX

## 2017-11-29 PROCEDURE — G0378 HOSPITAL OBSERVATION PER HR: HCPCS | Mod: NTX

## 2017-11-29 PROCEDURE — 99284 EMERGENCY DEPT VISIT MOD MDM: CPT | Mod: 25,NTX

## 2017-11-29 PROCEDURE — 80053 COMPREHEN METABOLIC PANEL: CPT | Mod: NTX

## 2017-11-29 PROCEDURE — 78452 HT MUSCLE IMAGE SPECT MULT: CPT | Mod: 26,NTX,, | Performed by: RADIOLOGY

## 2017-11-29 PROCEDURE — 87040 BLOOD CULTURE FOR BACTERIA: CPT | Mod: NTX

## 2017-11-29 PROCEDURE — 87880 STREP A ASSAY W/OPTIC: CPT | Mod: NTX

## 2017-11-29 PROCEDURE — 78452 HT MUSCLE IMAGE SPECT MULT: CPT | Mod: TC,NTX

## 2017-11-29 PROCEDURE — 93018 CV STRESS TEST I&R ONLY: CPT | Mod: NTX,,, | Performed by: INTERNAL MEDICINE

## 2017-11-29 PROCEDURE — 25000003 PHARM REV CODE 250: Mod: NTX | Performed by: EMERGENCY MEDICINE

## 2017-11-29 RX ORDER — CALCITRIOL 0.25 UG/1
0.5 CAPSULE ORAL 2 TIMES DAILY
Status: DISCONTINUED | OUTPATIENT
Start: 2017-11-30 | End: 2017-12-03 | Stop reason: HOSPADM

## 2017-11-29 RX ORDER — LANTHANUM CARBONATE 500 MG/1
1000 TABLET, CHEWABLE ORAL
Status: DISCONTINUED | OUTPATIENT
Start: 2017-11-30 | End: 2017-12-03 | Stop reason: HOSPADM

## 2017-11-29 RX ORDER — ACETAMINOPHEN 325 MG/1
650 TABLET ORAL EVERY 8 HOURS PRN
Status: DISCONTINUED | OUTPATIENT
Start: 2017-11-29 | End: 2017-11-30

## 2017-11-29 RX ORDER — CINACALCET 30 MG/1
90 TABLET, FILM COATED ORAL DAILY
Status: DISCONTINUED | OUTPATIENT
Start: 2017-11-30 | End: 2017-12-03 | Stop reason: HOSPADM

## 2017-11-29 RX ORDER — IPRATROPIUM BROMIDE AND ALBUTEROL SULFATE 2.5; .5 MG/3ML; MG/3ML
3 SOLUTION RESPIRATORY (INHALATION) EVERY 4 HOURS PRN
Status: DISCONTINUED | OUTPATIENT
Start: 2017-11-29 | End: 2017-12-03 | Stop reason: HOSPADM

## 2017-11-29 RX ORDER — CLOPIDOGREL BISULFATE 75 MG/1
75 TABLET ORAL DAILY
Status: DISCONTINUED | OUTPATIENT
Start: 2017-11-30 | End: 2017-12-03 | Stop reason: HOSPADM

## 2017-11-29 RX ORDER — ASPIRIN 81 MG/1
81 TABLET ORAL DAILY
Status: DISCONTINUED | OUTPATIENT
Start: 2017-11-30 | End: 2017-12-03 | Stop reason: HOSPADM

## 2017-11-29 RX ORDER — SODIUM CHLORIDE 0.9 % (FLUSH) 0.9 %
5 SYRINGE (ML) INJECTION
Status: DISCONTINUED | OUTPATIENT
Start: 2017-11-29 | End: 2017-12-03 | Stop reason: HOSPADM

## 2017-11-29 RX ORDER — IBUPROFEN 200 MG
24 TABLET ORAL
Status: DISCONTINUED | OUTPATIENT
Start: 2017-11-29 | End: 2017-12-03 | Stop reason: HOSPADM

## 2017-11-29 RX ORDER — GLUCAGON 1 MG
1 KIT INJECTION
Status: DISCONTINUED | OUTPATIENT
Start: 2017-11-29 | End: 2017-12-03 | Stop reason: HOSPADM

## 2017-11-29 RX ORDER — IBUPROFEN 200 MG
16 TABLET ORAL
Status: DISCONTINUED | OUTPATIENT
Start: 2017-11-29 | End: 2017-12-03 | Stop reason: HOSPADM

## 2017-11-29 RX ORDER — ATORVASTATIN CALCIUM 20 MG/1
40 TABLET, FILM COATED ORAL NIGHTLY
Status: DISCONTINUED | OUTPATIENT
Start: 2017-11-30 | End: 2017-12-03 | Stop reason: HOSPADM

## 2017-11-29 RX ORDER — CILOSTAZOL 50 MG/1
50 TABLET ORAL 2 TIMES DAILY
Status: DISCONTINUED | OUTPATIENT
Start: 2017-11-30 | End: 2017-12-03 | Stop reason: HOSPADM

## 2017-11-29 RX ORDER — ACETAMINOPHEN 500 MG
1000 TABLET ORAL
Status: COMPLETED | OUTPATIENT
Start: 2017-11-29 | End: 2017-11-29

## 2017-11-29 RX ORDER — INSULIN ASPART 100 [IU]/ML
0-5 INJECTION, SOLUTION INTRAVENOUS; SUBCUTANEOUS
Status: DISCONTINUED | OUTPATIENT
Start: 2017-11-29 | End: 2017-12-03 | Stop reason: HOSPADM

## 2017-11-29 RX ORDER — SODIUM CHLORIDE 9 MG/ML
INJECTION, SOLUTION INTRAVENOUS CONTINUOUS
Status: DISCONTINUED | OUTPATIENT
Start: 2017-11-30 | End: 2017-11-30

## 2017-11-29 RX ORDER — ONDANSETRON 8 MG/1
8 TABLET, ORALLY DISINTEGRATING ORAL EVERY 8 HOURS PRN
Status: DISCONTINUED | OUTPATIENT
Start: 2017-11-29 | End: 2017-12-03 | Stop reason: HOSPADM

## 2017-11-29 RX ORDER — AMLODIPINE BESYLATE 5 MG/1
10 TABLET ORAL DAILY
Status: DISCONTINUED | OUTPATIENT
Start: 2017-11-30 | End: 2017-12-03 | Stop reason: HOSPADM

## 2017-11-29 RX ORDER — HEPARIN SODIUM 5000 [USP'U]/ML
5000 INJECTION, SOLUTION INTRAVENOUS; SUBCUTANEOUS EVERY 12 HOURS
Status: DISCONTINUED | OUTPATIENT
Start: 2017-11-30 | End: 2017-11-30

## 2017-11-29 RX ADMIN — ACETAMINOPHEN 1000 MG: 500 TABLET ORAL at 07:11

## 2017-11-29 RX ADMIN — AZITHROMYCIN MONOHYDRATE 500 MG: 500 INJECTION, POWDER, LYOPHILIZED, FOR SOLUTION INTRAVENOUS at 10:11

## 2017-11-30 PROBLEM — J96.01 ACUTE RESPIRATORY FAILURE WITH HYPOXIA AND HYPERCARBIA: Status: ACTIVE | Noted: 2017-11-30

## 2017-11-30 PROBLEM — J96.02 ACUTE RESPIRATORY FAILURE WITH HYPOXIA AND HYPERCARBIA: Status: ACTIVE | Noted: 2017-11-30

## 2017-11-30 LAB
ANION GAP SERPL CALC-SCNC: 15 MMOL/L
ANISOCYTOSIS BLD QL SMEAR: SLIGHT
BASOPHILS # BLD AUTO: 0.04 K/UL
BASOPHILS NFR BLD: 0.7 %
BUN SERPL-MCNC: 19 MG/DL
CALCIUM SERPL-MCNC: 10.6 MG/DL
CHLORIDE SERPL-SCNC: 97 MMOL/L
CO2 SERPL-SCNC: 25 MMOL/L
CREAT SERPL-MCNC: 5.7 MG/DL
DIFFERENTIAL METHOD: ABNORMAL
EOSINOPHIL # BLD AUTO: 0.1 K/UL
EOSINOPHIL NFR BLD: 1.8 %
ERYTHROCYTE [DISTWIDTH] IN BLOOD BY AUTOMATED COUNT: 14 %
EST. GFR  (AFRICAN AMERICAN): 9 ML/MIN/1.73 M^2
EST. GFR  (NON AFRICAN AMERICAN): 8 ML/MIN/1.73 M^2
ESTIMATED AVG GLUCOSE: 180 MG/DL
GIANT PLATELETS BLD QL SMEAR: PRESENT
GLUCOSE SERPL-MCNC: 213 MG/DL
HBA1C MFR BLD HPLC: 7.9 %
HCT VFR BLD AUTO: 24.2 %
HGB BLD-MCNC: 7.9 G/DL
HYPOCHROMIA BLD QL SMEAR: ABNORMAL
LYMPHOCYTES # BLD AUTO: 1.7 K/UL
LYMPHOCYTES NFR BLD: 27.5 %
MAGNESIUM SERPL-MCNC: 2 MG/DL
MCH RBC QN AUTO: 32.2 PG
MCHC RBC AUTO-ENTMCNC: 32.6 G/DL
MCV RBC AUTO: 99 FL
MONOCYTES # BLD AUTO: 0.6 K/UL
MONOCYTES NFR BLD: 9.3 %
NEUTROPHILS # BLD AUTO: 3.7 K/UL
NEUTROPHILS NFR BLD: 60.7 %
OVALOCYTES BLD QL SMEAR: ABNORMAL
PHOSPHATE SERPL-MCNC: 5.1 MG/DL
PLATELET # BLD AUTO: 242 K/UL
PLATELET BLD QL SMEAR: ABNORMAL
PMV BLD AUTO: 10.9 FL
POCT GLUCOSE: 171 MG/DL (ref 70–110)
POCT GLUCOSE: 191 MG/DL (ref 70–110)
POCT GLUCOSE: 201 MG/DL (ref 70–110)
POCT GLUCOSE: 211 MG/DL (ref 70–110)
POCT GLUCOSE: 245 MG/DL (ref 70–110)
POIKILOCYTOSIS BLD QL SMEAR: SLIGHT
POTASSIUM SERPL-SCNC: 4.4 MMOL/L
RBC # BLD AUTO: 2.45 M/UL
SODIUM SERPL-SCNC: 137 MMOL/L
WBC # BLD AUTO: 6.15 K/UL

## 2017-11-30 PROCEDURE — 99900035 HC TECH TIME PER 15 MIN (STAT): Mod: NTX

## 2017-11-30 PROCEDURE — 27000221 HC OXYGEN, UP TO 24 HOURS: Mod: NTX

## 2017-11-30 PROCEDURE — 63600175 PHARM REV CODE 636 W HCPCS: Mod: NTX | Performed by: PHYSICIAN ASSISTANT

## 2017-11-30 PROCEDURE — 25000003 PHARM REV CODE 250: Mod: NTX | Performed by: HOSPITALIST

## 2017-11-30 PROCEDURE — 83735 ASSAY OF MAGNESIUM: CPT | Mod: NTX

## 2017-11-30 PROCEDURE — 25500020 PHARM REV CODE 255: Mod: NTX | Performed by: HOSPITALIST

## 2017-11-30 PROCEDURE — 99220 PR INITIAL OBSERVATION CARE,LEVL III: CPT | Mod: NTX,,, | Performed by: PHYSICIAN ASSISTANT

## 2017-11-30 PROCEDURE — 25000242 PHARM REV CODE 250 ALT 637 W/ HCPCS: Mod: NTX | Performed by: HOSPITALIST

## 2017-11-30 PROCEDURE — 25000003 PHARM REV CODE 250: Mod: NTX | Performed by: PHYSICIAN ASSISTANT

## 2017-11-30 PROCEDURE — 36415 COLL VENOUS BLD VENIPUNCTURE: CPT | Mod: NTX

## 2017-11-30 PROCEDURE — G0378 HOSPITAL OBSERVATION PER HR: HCPCS | Mod: NTX

## 2017-11-30 PROCEDURE — 80048 BASIC METABOLIC PNL TOTAL CA: CPT | Mod: NTX

## 2017-11-30 PROCEDURE — 94761 N-INVAS EAR/PLS OXIMETRY MLT: CPT | Mod: NTX

## 2017-11-30 PROCEDURE — 83036 HEMOGLOBIN GLYCOSYLATED A1C: CPT | Mod: NTX

## 2017-11-30 PROCEDURE — 63600175 PHARM REV CODE 636 W HCPCS: Mod: NTX | Performed by: HOSPITALIST

## 2017-11-30 PROCEDURE — 94640 AIRWAY INHALATION TREATMENT: CPT | Mod: NTX

## 2017-11-30 PROCEDURE — 84100 ASSAY OF PHOSPHORUS: CPT | Mod: NTX

## 2017-11-30 PROCEDURE — 63600175 PHARM REV CODE 636 W HCPCS: Mod: NTX | Performed by: EMERGENCY MEDICINE

## 2017-11-30 PROCEDURE — 97802 MEDICAL NUTRITION INDIV IN: CPT | Mod: NTX

## 2017-11-30 PROCEDURE — 25000242 PHARM REV CODE 250 ALT 637 W/ HCPCS: Mod: NTX | Performed by: PHYSICIAN ASSISTANT

## 2017-11-30 PROCEDURE — 85025 COMPLETE CBC W/AUTO DIFF WBC: CPT | Mod: NTX

## 2017-11-30 RX ORDER — SODIUM CHLORIDE 9 MG/ML
INJECTION, SOLUTION INTRAVENOUS ONCE
Status: DISCONTINUED | OUTPATIENT
Start: 2017-11-30 | End: 2017-12-03 | Stop reason: HOSPADM

## 2017-11-30 RX ORDER — GUAIFENESIN 100 MG/5ML
200 SOLUTION ORAL EVERY 6 HOURS
Status: COMPLETED | OUTPATIENT
Start: 2017-11-30 | End: 2017-12-02

## 2017-11-30 RX ORDER — ALBUTEROL SULFATE 0.83 MG/ML
1.25 SOLUTION RESPIRATORY (INHALATION) EVERY 6 HOURS
Status: COMPLETED | OUTPATIENT
Start: 2017-11-30 | End: 2017-12-01

## 2017-11-30 RX ORDER — HEPARIN SODIUM 5000 [USP'U]/ML
5000 INJECTION, SOLUTION INTRAVENOUS; SUBCUTANEOUS EVERY 8 HOURS
Status: DISCONTINUED | OUTPATIENT
Start: 2017-11-30 | End: 2017-12-03 | Stop reason: HOSPADM

## 2017-11-30 RX ORDER — ACETAMINOPHEN 325 MG/1
650 TABLET ORAL EVERY 6 HOURS PRN
Status: DISCONTINUED | OUTPATIENT
Start: 2017-11-30 | End: 2017-12-03 | Stop reason: HOSPADM

## 2017-11-30 RX ORDER — CLONIDINE HYDROCHLORIDE 0.1 MG/1
0.1 TABLET ORAL ONCE
Status: COMPLETED | OUTPATIENT
Start: 2017-11-30 | End: 2017-11-30

## 2017-11-30 RX ORDER — HYDRALAZINE HYDROCHLORIDE 20 MG/ML
10 INJECTION INTRAMUSCULAR; INTRAVENOUS EVERY 8 HOURS PRN
Status: DISCONTINUED | OUTPATIENT
Start: 2017-11-30 | End: 2017-12-03 | Stop reason: HOSPADM

## 2017-11-30 RX ADMIN — CALCITRIOL 0.5 MCG: 0.25 CAPSULE, LIQUID FILLED ORAL at 09:11

## 2017-11-30 RX ADMIN — GUAIFENESIN 200 MG: 100 SOLUTION ORAL at 11:11

## 2017-11-30 RX ADMIN — GUAIFENESIN 200 MG: 100 SOLUTION ORAL at 05:11

## 2017-11-30 RX ADMIN — AMLODIPINE BESYLATE 10 MG: 5 TABLET ORAL at 09:11

## 2017-11-30 RX ADMIN — PIPERACILLIN SODIUM AND TAZOBACTAM SODIUM 2.25 G: 2; .25 INJECTION, POWDER, FOR SOLUTION INTRAVENOUS at 10:11

## 2017-11-30 RX ADMIN — CINACALCET HYDROCHLORIDE 90 MG: 30 TABLET, COATED ORAL at 09:11

## 2017-11-30 RX ADMIN — HYDRALAZINE HYDROCHLORIDE 10 MG: 20 INJECTION INTRAMUSCULAR; INTRAVENOUS at 12:11

## 2017-11-30 RX ADMIN — HEPARIN SODIUM 5000 UNITS: 5000 INJECTION, SOLUTION INTRAVENOUS; SUBCUTANEOUS at 05:11

## 2017-11-30 RX ADMIN — ASPIRIN 81 MG: 81 TABLET, COATED ORAL at 09:11

## 2017-11-30 RX ADMIN — INSULIN ASPART 1 UNITS: 100 INJECTION, SOLUTION INTRAVENOUS; SUBCUTANEOUS at 12:11

## 2017-11-30 RX ADMIN — Medication 1 CAPSULE: at 09:11

## 2017-11-30 RX ADMIN — ALBUTEROL SULFATE 1.25 MG: 2.5 SOLUTION RESPIRATORY (INHALATION) at 07:11

## 2017-11-30 RX ADMIN — IPRATROPIUM BROMIDE AND ALBUTEROL SULFATE 3 ML: .5; 3 SOLUTION RESPIRATORY (INHALATION) at 03:11

## 2017-11-30 RX ADMIN — ACETAMINOPHEN 650 MG: 325 TABLET ORAL at 09:11

## 2017-11-30 RX ADMIN — ERYTHROPOIETIN 30000 UNITS: 20000 INJECTION, SOLUTION INTRAVENOUS; SUBCUTANEOUS at 09:11

## 2017-11-30 RX ADMIN — ACETAMINOPHEN 650 MG: 325 TABLET ORAL at 11:11

## 2017-11-30 RX ADMIN — CLOPIDOGREL 75 MG: 75 TABLET, FILM COATED ORAL at 09:11

## 2017-11-30 RX ADMIN — INSULIN ASPART 1 UNITS: 100 INJECTION, SOLUTION INTRAVENOUS; SUBCUTANEOUS at 09:11

## 2017-11-30 RX ADMIN — VANCOMYCIN HYDROCHLORIDE 1250 MG: 1 INJECTION, POWDER, LYOPHILIZED, FOR SOLUTION INTRAVENOUS at 07:11

## 2017-11-30 RX ADMIN — HEPARIN SODIUM 5000 UNITS: 5000 INJECTION, SOLUTION INTRAVENOUS; SUBCUTANEOUS at 09:11

## 2017-11-30 RX ADMIN — SODIUM CHLORIDE: 0.9 INJECTION, SOLUTION INTRAVENOUS at 12:11

## 2017-11-30 RX ADMIN — CEFTRIAXONE SODIUM 1 G: 1 INJECTION, POWDER, FOR SOLUTION INTRAMUSCULAR; INTRAVENOUS at 12:11

## 2017-11-30 RX ADMIN — LANTHANUM CARBONATE 1000 MG: 500 TABLET, CHEWABLE ORAL at 09:11

## 2017-11-30 RX ADMIN — ATORVASTATIN CALCIUM 40 MG: 20 TABLET, FILM COATED ORAL at 09:11

## 2017-11-30 RX ADMIN — CLONIDINE HYDROCHLORIDE 0.1 MG: 0.1 TABLET ORAL at 03:11

## 2017-11-30 RX ADMIN — ACETAMINOPHEN 650 MG: 325 TABLET ORAL at 03:11

## 2017-11-30 RX ADMIN — IOHEXOL 75 ML: 350 INJECTION, SOLUTION INTRAVENOUS at 07:11

## 2017-11-30 NOTE — SUBJECTIVE & OBJECTIVE
Interval History:  Pt new to me.  Reports cold congestion for 5 days and fevers for last 2.  Has cough but difficulty with getting secretions.  No unusual chest pain.  SaO2 low 80s on room air.     States rides a scooter a lot.  No leg swelling or pain.  CXR consistent with right upper PNA.      Review of Systems   Constitutional: Positive for fatigue and fever. Negative for diaphoresis.   Eyes: Negative for visual disturbance.   Respiratory: Positive for cough and shortness of breath.    Cardiovascular: Negative for chest pain and palpitations.   Gastrointestinal: Negative for abdominal pain and nausea.   Genitourinary: Negative for difficulty urinating and dysuria.   Musculoskeletal: Negative for arthralgias and myalgias.   Skin: Negative for color change and rash.   Neurological: Negative for dizziness and numbness.   Psychiatric/Behavioral: Negative for agitation and confusion.     Objective:     Vital Signs (Most Recent):  Temp: (!) 101 °F (38.3 °C) (11/30/17 1526)  Pulse: 101 (11/30/17 1600)  Resp: 18 (11/30/17 1526)  BP: 135/69 (11/30/17 1526)  SpO2: (!) 93 % (11/30/17 1526) Vital Signs (24h Range):  Temp:  [99.5 °F (37.5 °C)-103.1 °F (39.5 °C)] 101 °F (38.3 °C)  Pulse:  [] 101  Resp:  [14-20] 18  SpO2:  [87 %-98 %] 93 %  BP: (130-194)/(64-93) 135/69     Weight: 93 kg (205 lb)  Body mass index is 32.11 kg/m².  No intake or output data in the 24 hours ending 11/30/17 1649   Physical Exam   Constitutional: She is oriented to person, place, and time. She appears well-developed and well-nourished.   HENT:   Head: Normocephalic and atraumatic.   Eyes: Conjunctivae are normal. Pupils are equal, round, and reactive to light.   Neck: Normal range of motion. Neck supple.   Cardiovascular: Regular rhythm.    Murmur heard.  2/6 R2ICS   Pulmonary/Chest: Effort normal and breath sounds normal.   Abdominal: Soft. Bowel sounds are normal. There is no tenderness.   Musculoskeletal: She exhibits no edema or tenderness.    Neurological: She is alert and oriented to person, place, and time.   Skin: Skin is warm and dry.       Significant Labs:   CBC:   Recent Labs  Lab 11/29/17 2021 11/30/17 0429   WBC 7.41 6.15   HGB 8.7* 7.9*   HCT 26.3* 24.2*    242     CMP:   Recent Labs  Lab 11/29/17 2021 11/30/17 0429    137   K 3.8 4.4   CL 95 97   CO2 28 25   * 213*   BUN 15 19   CREATININE 4.7* 5.7*   CALCIUM 10.8* 10.6*   PROT 8.1  --    ALBUMIN 3.3*  --    BILITOT 0.4  --    ALKPHOS 124  --    AST 23  --    ALT 14  --    ANIONGAP 17* 15   EGFRNONAA 10* 8*       Significant Imaging: I have reviewed all pertinent imaging results/findings within the past 24 hours.   Imaging Results          X-Ray Chest PA And Lateral (Final result)  Result time 11/29/17 21:16:01    Final result by Adan Alves MD (11/29/17 21:16:01)                 Impression:        Right upper lobe airspace opacity concerning for pneumonia.      Electronically signed by: ADAN ALVES MD  Date:     11/29/17  Time:    21:16              Narrative:    Technique:  Chest PA and lateral radiographs    Comparison: 2/22/17    Findings:     Left IJ approach dialysis catheter terminates over the right atrium. There is a right upper lobe airspace opacity. Mild central vascular congestion. The mediastinal structures are midline. The cardiac silhouette is normal in size. The osseous structures demonstrate no acute abnormality.

## 2017-11-30 NOTE — ASSESSMENT & PLAN NOTE
- hold oral antidibetic meds  - diabetic diet  - accuchecks  - SSRI   - last A1C: 9.0 on 2/22/17    - A1C pending for AM

## 2017-11-30 NOTE — CONSULTS
Consult Note  Nephrology    Consult Requested By: Derek Merida MD  Reason for Consult: ESRD    SUBJECTIVE:     History of Present Illness:  Patient is a 47 y.o. 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  She reports fatigue, fevers, chills, and productive cough at home.    She was apparently on PD for the last 12 years until October when she had peritonitis again (pt doesn't remember pathogen) which required catheter removal.  She had new ERNIE placed by Dr. Munroe on 17.  She dialyzes at [facility] and reports EDW 93kg.  She is followed by Dr. Watt.    She is quite hypoxic, considering exam and CXR findings.  CTA ordered to eval for PE.    Past Medical History:   Diagnosis Date    Abnormal finding on Pap smear, HPV DNA positive     Anemia associated with chronic renal failure     on Epogen    Blood type B+     Bulging discs - symptomatic      CAD (coronary artery disease)     Diabetes mellitus, type 2     ESRD (end stage renal disease) 2004    FSGS (focal segmental glomerulosclerosis)     with collapsing    Hyperlipidemia     Hypertension     Neuropathy     Obesity     Secondary hyperparathyroidism, renal     TIA (transient ischemic attack)     Uterine fibroid     small uterine      Past Surgical History:   Procedure Laterality Date    CARDIAC CATHETERIZATION      PCI x 2     SECTION, CLASSIC      DIALYSIS FISTULA CREATION      multiple fistulas and grafts before PD     INCISION AND DRAINAGE OF WOUND Left     VULVAR ABCESS WITH NECROSIS    PERITONEAL CATHETER INSERTION      UMBILICAL HERNIA REPAIR       Family History   Problem Relation Age of Onset    Cancer Maternal Grandmother     Cancer Paternal Grandfather     Diabetes Maternal Aunt     Diabetes Paternal Aunt     Kidney disease Neg Hx     Heart disease Neg Hx     Ovarian cancer Neg Hx     Breast cancer Neg Hx      Social History    Substance Use Topics    Smoking status: Never Smoker    Smokeless tobacco: Never Used    Alcohol use No      Comment: Pt reports some social use of about 1-2 drinks about every six months.       Review of patient's allergies indicates:   Allergen Reactions    Clindamycin Diarrhea    Flagyl [metronidazole hcl]         Review of Systems:  Constitutional: positive for chills, fatigue, fevers and malaise  Eyes: no visual changes  ENT: no nasal congestion or sore throat  Respiratory: positive for cough  Cardiovascular: no chest pain or palpitations  Gastrointestinal: no nausea or vomiting, no abdominal pain or change in bowel habits  Musculoskeletal: positive for muscle weakness and myalgias  Neurological: no seizures or tremors  Behavioral/Psych: no auditory or visual hallucinations  Endocrine: no heat or cold intolerance     OBJECTIVE:     Vital Signs (Most Recent)  Temp: (!) 101 °F (38.3 °C) (11/30/17 1526)  Pulse: 101 (11/30/17 1600)  Resp: 18 (11/30/17 1526)  BP: 135/69 (11/30/17 1526)  SpO2: (!) 93 % (11/30/17 1526)    Vital Signs Range (Last 24H):  Temp:  [99.5 °F (37.5 °C)-103.1 °F (39.5 °C)]   Pulse:  []   Resp:  [14-20]   BP: (130-194)/(64-93)   SpO2:  [87 %-98 %]     Physical Exam:  Gen: AAOx3, NAD  HEENT: mmm, sclera anicteric  CV: RRR, no m/r  Resp: CTAB, no rales or wheezes  GI: soft, ND, NTTP, +BS  Extr: no edema  Access:     amLODIPine  10 mg Oral Daily    aspirin  81 mg Oral Daily    atorvastatin  40 mg Oral QHS    azithromycin  500 mg Intravenous Q24H    calcitRIOL  0.5 mcg Oral BID    [START ON 12/1/2017] cefTRIAXone (ROCEPHIN) IVPB  1 g Intravenous Q24H    cilostazol  50 mg Oral BID    cinacalcet  90 mg Oral Daily    clopidogrel  75 mg Oral Daily    epogen adonay (PROCRIT) injection 30,000 units kit  30,000 Units Intravenous Q14 Days    lanthanum  1,000 mg Oral TID WM    vitamin renal formula (B-complex-vitamin c-folic acid)  1 capsule Oral Daily       Laboratory:      Recent  Labs  Lab 11/29/17 2021 11/30/17  0429    137   K 3.8 4.4   CL 95 97   CO2 28 25   BUN 15 19   CREATININE 4.7* 5.7*   CALCIUM 10.8* 10.6*   PHOS  --  5.1*       Recent Labs  Lab 11/29/17 2021 11/30/17  0429   WBC 7.41 6.15   HGB 8.7* 7.9*   HCT 26.3* 24.2*    242          Diagnostic Results:  Labs: Reviewed  X-Ray: Reviewed  none    ASSESSMENT/PLAN:     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  - I have notified primary nephrologist Dr. Watt of pt's hospitalization.  - She dialyzes at [facility].  EDW reportedly 93kg.    HTN (I12.0)  - Amlodipine with hold parameters.  - Unclear why she is not on ACEi or ARB with history of CAD and DM.    Secondary HPTH and Hyperphosphatemia (N25.81, E83.39)  - Sensipar qd.  - Lanthanum tid with meals.    Anemia of CKD (D63.1)  - Check iron studies with AM labs.  - Will call [facility] tomorrow for Epo and iron doses.    Hypoxia  - Due to HCAP vs PE?  With recent ERNIE placement, could have embolus.  - Agree with CTA.  - Pt does not have any residual renal function.  OK to proceed with IV contrast.  - Next HD tomorrow, per usual MWF schedule.    HCAP  - Would switch abx from Ceftriaxone/Azithro to Vanc/Zosyn to cover for HCAP.  - D/W Dr. Merida.    Thank you for the consult.  We will continue to follow along with you.    Laurent Wild MD  Nephrology

## 2017-11-30 NOTE — ED NOTES
MD Iglesias at bedside updating pt on POC;  MD stated it was ok for pt to have warm blanket; pt temp 102.2; MD aware; warm blanket given to pt; NADN; will monitor

## 2017-11-30 NOTE — CONSULTS
[facility]  Adult Nutrition  Consult Note    SUMMARY     Recommendations    Recommendation/Intervention:   1. Recommend 1800kcal DM + renal diet   2. Recommend novasource renal 1x/d   3. Provided written diet education materials    Goals:   1. Meet >85% of estimated nutritional needs   2. Prevent >=2% dry wt loss x 1 week   3. Promote nutrition related labs WNL    Nutrition Goal Status: new  Communication of RD Recs: other (comment) (care plan)    Reason for Assessment    Reason for Assessment: nurse/nurse practitioner consult  Diagnosis:   1. Pneumonia of right upper lobe due to infectious organism    2. Cough    3. ESRD on dialysis    4. Anemia, unspecified type    5. Hypercalcemia      Past Medical History:   Diagnosis Date    Abnormal finding on Pap smear, HPV DNA positive 2014    Anemia associated with chronic renal failure     on Epogen    Blood type B+     Bulging discs - symptomatic      CAD (coronary artery disease)     Diabetes mellitus, type 2     ESRD (end stage renal disease) 04/20/2004    FSGS (focal segmental glomerulosclerosis)     with collapsing    Hyperlipidemia     Hypertension 2004    Neuropathy     Obesity     Secondary hyperparathyroidism, renal     TIA (transient ischemic attack)     Uterine fibroid     small uterine      General Information Comments: Pt endorses poor appetite with minimal intake since Sunday (x5 days), describes lack of interest in eating. Pt unsure of usual dry wt or any wt changes. Pt had untouched lunch at bedside. Pt wasn't feeling well and stated she has been educated on DM and renal diets, but accepted written education materials.      Nutrition Discharge Planning: d/c on DM and renal diet + oral supplements prn    Nutrition Prescription Ordered    Current Diet Order: DM 1800kcal    Evaluation of Received Nutrients/Fluid Intake    % Intake of Estimated Energy Needs: 0 - 25 %  % Meal Intake: 25%     Nutrition Risk Screen    "  Nutrition Risk Screen: other (see comments)    Nutrition/Diet History    Patient Reported Diet/Restrictions/Preferences: renal, diabetic diet  Food Preferences: No cultural/spiritual prefs noted  Factors Affecting Nutritional Intake: decreased appetite    Labs/Tests/Procedures/Meds    Pertinent Labs Reviewed: reviewed  Lab Results   Component Value Date     11/30/2017    K 4.4 11/30/2017    CL 97 11/30/2017    CO2 25 11/30/2017    BUN 19 11/30/2017    CREATININE 5.7 (H) 11/30/2017    CALCIUM 10.6 (H) 11/30/2017    ESTGFRAFRICA 9 (A) 11/30/2017    EGFRNONAA 8 (A) 11/30/2017     Lab Results   Component Value Date    ALT 14 11/29/2017    AST 23 11/29/2017    ALKPHOS 124 11/29/2017     POCT Glucose   Date Value Ref Range Status   11/30/2017 201 (H) 70 - 110 mg/dL Final   11/30/2017 191 (H) 70 - 110 mg/dL Final   11/30/2017 245 (H) 70 - 110 mg/dL Final     Lab Results   Component Value Date    HGBA1C 7.9 (H) 11/30/2017     Lab Results   Component Value Date    HCT 24.2 (L) 11/30/2017     Lab Results   Component Value Date    HGB 7.9 (L) 11/30/2017     Pertinent Medications Reviewed: reviewed  Scheduled Meds:   amLODIPine  10 mg Oral Daily    aspirin  81 mg Oral Daily    atorvastatin  40 mg Oral QHS    azithromycin  500 mg Intravenous Q24H    calcitRIOL  0.5 mcg Oral BID    [START ON 12/1/2017] cefTRIAXone (ROCEPHIN) IVPB  1 g Intravenous Q24H    cilostazol  50 mg Oral BID    cinacalcet  90 mg Oral Daily    clopidogrel  75 mg Oral Daily    epogen adonay (PROCRIT) injection 30,000 units kit  30,000 Units Intravenous Q14 Days    lanthanum  1,000 mg Oral TID WM    vitamin renal formula (B-complex-vitamin c-folic acid)  1 capsule Oral Daily     Physical Findings    Overall Physical Appearance: obese  Oral/Mouth Cavity: tooth/teeth missing  Skin: incision    Anthropometrics    Temp: (!) 102.4 °F (39.1 °C)  Height: 5' 7" (170.2 cm)  Weight Method: Bed Scale  Weight: 93 kg (205 lb)  Ideal Body Weight (IBW), " Female: 135 lb  % Ideal Body Weight, Female (lb): 151.85 lb  BMI (Calculated): 32.2  BMI Grade: 30 - 34.9- obesity - grade I    Estimated/Assessed Needs    Weight Used For Calorie Calculations: 93 kg (205 lb 0.4 oz)   Height (cm): 170.2 cm  Energy Calorie Requirements (kcal): 9825-4690  Energy Need Method: [place]-St Jeor (stress factor 1.3-1.4)    Weight Used For Protein Calculations: 61.2 kg (135 lb) (IBW)  Protein Requirements: 110-123 gm/d (1.8-2 gm/kg IBW)    Fluid Requirements (mL): 1000 (+UOP, or per team)  Fluid Need Method: RDA Method    Assessment and Plan    ESRD (end stage renal disease) on PD ( initiated dialysis 04/20/2004)    Nutrition Diagnosis:  Increased nutrient needs    Related to (etiology):   Increased demand 2/2 dialysis    Signs and Symptoms (as evidenced by):   Pt with ESRD on dialysis     Interventions/Recommendations (treatment strategy):  Renal + 1800kcal DM diet + novasource renal 1x/d; See RD note 11/30/2017 for full recs     Nutrition Diagnosis Status:   New          Nutrition Problem  Inadequate oral intake    Related to (etiology):   Decreased appetite    Signs and Symptoms (as evidenced by):   Pt report of decreased appetite x 5 days and observed minimal intake     Interventions/Recommendations (treatment strategy):  Renal + 1800kcal DM diet + novasource renal 1x/d    Nutrition Diagnosis Status:   New    Monitor and Evaluation    Food and Nutrient Intake: energy intake, food and beverage intake  Food and Nutrient Adminstration: diet order  Knowledge/Beliefs/Attitudes: food and nutrition knowledge/skill  Physical Activity and Function: nutrition-related ADLs and IADLs  Anthropometric Measurements: weight, weight change  Biochemical Data, Medical Tests and Procedures: electrolyte and renal panel, gastrointestinal profile, glucose/endocrine profile, inflammatory profile  Nutrition-Focused Physical Findings: overall appearance, extremities, muscles and bones, skin    Nutrition  Risk    Level of Risk: other (see comments) (f/u 2x/wk)    Nutrition Follow-Up    RD Follow-up?: Yes    Chelo Calvin, MS, RD, LDN   Dietitian, [facility]  [phone]

## 2017-11-30 NOTE — SUBJECTIVE & OBJECTIVE
Past Medical History:   Diagnosis Date    Abnormal finding on Pap smear, HPV DNA positive     Anemia associated with chronic renal failure     on Epogen    Blood type B+     Bulging discs - symptomatic      CAD (coronary artery disease)     Diabetes mellitus, type 2     ESRD (end stage renal disease) 2004    FSGS (focal segmental glomerulosclerosis)     with collapsing    Hyperlipidemia     Hypertension     Neuropathy     Obesity     Secondary hyperparathyroidism, renal     TIA (transient ischemic attack)     Uterine fibroid     small uterine        Past Surgical History:   Procedure Laterality Date    CARDIAC CATHETERIZATION      PCI x 2     SECTION, CLASSIC      DIALYSIS FISTULA CREATION      multiple fistulas and grafts before PD     INCISION AND DRAINAGE OF WOUND Left     VULVAR ABCESS WITH NECROSIS    PERITONEAL CATHETER INSERTION      UMBILICAL HERNIA REPAIR         Review of patient's allergies indicates:   Allergen Reactions    Clindamycin Diarrhea    Flagyl [metronidazole hcl]        No current facility-administered medications on file prior to encounter.      Current Outpatient Prescriptions on File Prior to Encounter   Medication Sig    amlodipine (NORVASC) 10 MG tablet 10 mg once daily.     aspirin (ECOTRIN) 81 MG EC tablet Take 1 tablet (81 mg total) by mouth once daily.    atorvastatin (LIPITOR) 40 MG tablet Take 1 tablet (40 mg total) by mouth every evening.    calcitRIOL (ROCALTROL) 0.5 MCG Cap Take 1 capsule by mouth 2 (two) times daily.     cilostazol (PLETAL) 50 MG Tab Take 1 tablet (50 mg total) by mouth 2 (two) times daily.    cinacalcet (SENSIPAR) 90 MG Tab Take 1 tablet by mouth once daily.     epoetin adonay 10,000 unit/mL Soln 1 mL, epoetin adonay 20,000 unit/mL Soln 1 mL Inject 30,000 Units into the vein every 14 (fourteen) days.    gabapentin (NEURONTIN) 300 MG capsule 3 CAPS PM,AM,NOON THEN 3 CAPS PM, 3CAPS AM & 2 NOON THEN INCREASE TO  3 CAPS 3 TIMES A DAY    lanthanum (FOSRENOL) 1000 MG chewable tablet Take 1,000 mg by mouth 3 (three) times daily with meals.      nateglinide (STARLIX) 120 MG tablet STARLIX 120MG 30 MINUTES BEFORE EACH MEAL.    ONETOUCH VERIO Strp USE 1 STRIP TWICE DAILY IN VITRO    PLAVIX 75 mg tablet TAKE 1 BY MOUTH DAILY    vitamin renal formula, B-complex-vitamin c-folic acid, (B COMPLEX-C-FOLIC ACID) 1 mg Cap Take 1 capsule by mouth once daily. 1 Capsule Oral Every day     Family History     Problem Relation (Age of Onset)    Cancer Maternal Grandmother, Paternal Grandfather    Diabetes Maternal Aunt, Paternal Aunt        Social History Main Topics    Smoking status: Never Smoker    Smokeless tobacco: Never Used    Alcohol use No      Comment: Pt reports some social use of about 1-2 drinks about every six months.    Drug use: No    Sexual activity: Not Currently     Partners: Male     Birth control/ protection: None     Review of Systems   Constitutional: Positive for fatigue and fever. Negative for activity change, chills and diaphoresis.   HENT: Positive for sore throat. Negative for congestion, ear pain, postnasal drip, rhinorrhea, sinus pressure and trouble swallowing.    Respiratory: Positive for cough. Negative for chest tightness, shortness of breath and wheezing.    Cardiovascular: Negative for chest pain, palpitations and leg swelling.   Gastrointestinal: Negative for abdominal pain, diarrhea, nausea and vomiting.   Musculoskeletal: Positive for myalgias. Negative for arthralgias and neck stiffness.   Skin: Negative for color change, pallor, rash and wound.   Neurological: Negative for dizziness, weakness, light-headedness and headaches.     Objective:     Vital Signs (Most Recent):  Temp: (!) 103.1 °F (39.5 °C) (11/30/17 0316)  Pulse: 110 (11/30/17 0317)  Resp: 14 (11/30/17 0317)  BP: (!) 174/86 (11/30/17 0316)  SpO2: (!) 92 % (11/30/17 0317) Vital Signs (24h Range):  Temp:  [99.5 °F (37.5 °C)-103.1 °F  (39.5 °C)] 103.1 °F (39.5 °C)  Pulse:  [102-116] 110  Resp:  [14-20] 14  SpO2:  [87 %-98 %] 92 %  BP: (130-194)/(75-93) 174/86     Weight: 93 kg (205 lb)  Body mass index is 32.11 kg/m².    Physical Exam   Constitutional: She is oriented to person, place, and time. She appears well-developed and well-nourished. No distress.   Appears stated age, NAD, recumbent in bed. Warm to the touch.    HENT:   Head: Normocephalic and atraumatic.   Eyes: Conjunctivae and EOM are normal. Pupils are equal, round, and reactive to light. No scleral icterus.   Neck: Normal range of motion. Neck supple. No JVD present. No tracheal deviation present.   Cardiovascular: Normal rate, regular rhythm, normal heart sounds and intact distal pulses.  Exam reveals no gallop and no friction rub.    No murmur heard.  2+ distal radial/DT/PT pulses. No carotid bruits.     Pulmonary/Chest: Effort normal and breath sounds normal. No stridor. No respiratory distress. She has no wheezes. She has no rales.   Abdominal: Soft. Bowel sounds are normal. She exhibits no distension. There is no tenderness. There is no guarding.   Musculoskeletal: Normal range of motion. She exhibits no deformity.   Neurological: She is alert and oriented to person, place, and time. No cranial nerve deficit. She exhibits normal muscle tone.   Skin: Skin is warm and dry. Capillary refill takes less than 2 seconds. She is not diaphoretic. No erythema.   Dialysis access site without erythema, discharge, tenderness or induration.    Psychiatric: She has a normal mood and affect. Her behavior is normal. Judgment and thought content normal.   Nursing note and vitals reviewed.        CRANIAL NERVES     CN III, IV, VI   Pupils are equal, round, and reactive to light.  Extraocular motions are normal.        Significant Labs:   BMP:   Recent Labs  Lab 11/29/17 2021   *      K 3.8   CL 95   CO2 28   BUN 15   CREATININE 4.7*   CALCIUM 10.8*     CBC:   Recent Labs  Lab  11/29/17 2021   WBC 7.41   HGB 8.7*   HCT 26.3*        CMP:   Recent Labs  Lab 11/29/17 2021      K 3.8   CL 95   CO2 28   *   BUN 15   CREATININE 4.7*   CALCIUM 10.8*   PROT 8.1   ALBUMIN 3.3*   BILITOT 0.4   ALKPHOS 124   AST 23   ALT 14   ANIONGAP 17*   EGFRNONAA 10*     All pertinent labs within the past 24 hours have been reviewed.    Significant Imaging: I have reviewed all pertinent imaging results/findings within the past 24 hours.     Imaging Results          X-Ray Chest PA And Lateral (Final result)  Result time 11/29/17 21:16:01    Final result by Adan Alves MD (11/29/17 21:16:01)                 Impression:        Right upper lobe airspace opacity concerning for pneumonia.      Electronically signed by: ADAN ALVES MD  Date:     11/29/17  Time:    21:16              Narrative:    Technique:  Chest PA and lateral radiographs    Comparison: 2/22/17    Findings:     Left IJ approach dialysis catheter terminates over the right atrium. There is a right upper lobe airspace opacity. Mild central vascular congestion. The mediastinal structures are midline. The cardiac silhouette is normal in size. The osseous structures demonstrate no acute abnormality.

## 2017-11-30 NOTE — PLAN OF CARE
Problem: Patient Care Overview  Goal: Plan of Care Review  Outcome: Ongoing (interventions implemented as appropriate)  Pt. Sat was 88% on RA and wheezing. Put her on 02 at 2lpm per nc, and gave her a prn aero/tx. BS improved

## 2017-11-30 NOTE — ASSESSMENT & PLAN NOTE
Nutrition Diagnosis:  Increased nutrient needs    Related to (etiology):   Increased demand 2/2 dialysis    Signs and Symptoms (as evidenced by):   Pt with ESRD on dialysis     Interventions/Recommendations (treatment strategy):  Renal + 1800kcal DM diet + novasource renal 1x/d; See RD note 11/30/2017 for full recs     Nutrition Diagnosis Status:   New

## 2017-11-30 NOTE — ASSESSMENT & PLAN NOTE
- continue atorvastatin 40 mg QD  - last lipid panel:    2/24/2017 05:46   Cholesterol 199   HDL 19 (L)   LDL Cholesterol Invalid, Trig>400.0   Total Cholesterol/HDL Ratio 10.5 (H)   Triglycerides 460 (H)

## 2017-11-30 NOTE — PLAN OF CARE
Problem: Patient Care Overview  Goal: Plan of Care Review  Outcome: Ongoing (interventions implemented as appropriate)  Pt free of fall or injury during shift.  Denies pain.  Positions self independently in bed. Poor appetite today; has slept most of shift.  Sinus tachycardia/normal sinus on telemetry.  Fever down to 101, Dr. Merida aware.  Purposeful rounding performed and safety maintained.  Pt resting comfortably in bed, no other complaints at this time.  NPO until after CT.  Family at bedside.  Will continue to monitor.

## 2017-11-30 NOTE — NURSING
Notified by CT that pt must have 20g or 18g IV wrist or above for test.  Pt only has a 22g at this time.  Charge nurse attempt unsuccessful.  House supervisor at bedside attempting.  Will call CT when IV is placed.

## 2017-11-30 NOTE — HPI
Ms. Jenni Todd is a 47 y.o. female, with PMH of CAD, NIDDM-II, ESRD on dialysis (M/WF, Dr. Munroe; with recent Tevin catheter placement 2 weeks ago), anemia of chronic disease, and HTN, who presented to [facility] ED on 11/29/17 2/2 fever x 2 days. TMAX of 102.2 F, when taken at dialysis the past two sessions temp has been 101.0 F PO. Associated symptoms include chills, congestion, rhinorrhea, sore throat, cough, and eye discharge which all began 5 days ago. She denies chest pain, shortness of breath, nausea, vomiting, diarrhea, dysuria, myalgias, rash, headache, or confusion.      The patient was evaluated in the ED, and diagnosed with RUL PNA, started on ABX, and admitted for further management.

## 2017-11-30 NOTE — PLAN OF CARE
Problem: Patient Care Overview  Goal: Plan of Care Review  Outcome: Ongoing (interventions implemented as appropriate)  Recommendation/Intervention:   1. Recommend 1800kcal DM + renal diet   2. Recommend novasource renal 1x/d   3. Provided written diet education materials     Goals:   1. Meet >85% of estimated nutritional needs   2. Prevent >=2% dry wt loss x 1 week   3. Promote nutrition related labs WNL    See RD note 11/30/2017 for full recs     Chelo Calvin, MS, RD, LDN   Dietitian, [facility]  [phone]

## 2017-11-30 NOTE — PROGRESS NOTES
[facility] Hospital Medicine  Progress Note    Patient Name: Jenni Todd  MRN: [id number]  Patient Class: OP- Observation   Admission Date: 11/29/2017  Length of Stay: 0 days  Attending Physician: Derek Merida MD  Primary Care Provider: Michael Tan Iii, MD        Subjective:     Principal Problem:Pneumonia of right upper lobe due to infectious organism    HPI:  Ms. Jenni Todd is a 47 y.o. female, with PMH of CAD, NIDDM-II, ESRD on dialysis (M/WF, Dr. Munroe; with recent Tevin catheter placement 2 weeks ago), anemia of chronic disease, and HTN, who presented to [facility] ED on 11/29/17 2/2 fever x 2 days. TMAX of 102.2 F, when taken at dialysis the past two sessions temp has been 101.0 F PO. Associated symptoms include chills, congestion, rhinorrhea, sore throat, cough, and eye discharge which all began 5 days ago. She denies chest pain, shortness of breath, nausea, vomiting, diarrhea, dysuria, myalgias, rash, headache, or confusion.      The patient was evaluated in the ED, and diagnosed with RUL PNA, started on ABX, and admitted for further management.     Hospital Course:  No notes on file    Interval History:  Pt new to me.  Reports cold congestion for 5 days and fevers for last 2.  Has cough but difficulty with getting secretions.  No unusual chest pain.  SaO2 low 80s on room air.     States rides a scooter a lot.  No leg swelling or pain.  CXR consistent with right upper PNA.      Review of Systems   Constitutional: Positive for fatigue and fever. Negative for diaphoresis.   Eyes: Negative for visual disturbance.   Respiratory: Positive for cough and shortness of breath.    Cardiovascular: Negative for chest pain and palpitations.   Gastrointestinal: Negative for abdominal pain and nausea.   Genitourinary: Negative for difficulty urinating and dysuria.   Musculoskeletal: Negative for arthralgias and myalgias.   Skin: Negative for color change and rash.   Neurological:  Negative for dizziness and numbness.   Psychiatric/Behavioral: Negative for agitation and confusion.     Objective:     Vital Signs (Most Recent):  Temp: (!) 101 °F (38.3 °C) (11/30/17 1526)  Pulse: 101 (11/30/17 1600)  Resp: 18 (11/30/17 1526)  BP: 135/69 (11/30/17 1526)  SpO2: (!) 93 % (11/30/17 1526) Vital Signs (24h Range):  Temp:  [99.5 °F (37.5 °C)-103.1 °F (39.5 °C)] 101 °F (38.3 °C)  Pulse:  [] 101  Resp:  [14-20] 18  SpO2:  [87 %-98 %] 93 %  BP: (130-194)/(64-93) 135/69     Weight: 93 kg (205 lb)  Body mass index is 32.11 kg/m².  No intake or output data in the 24 hours ending 11/30/17 1649   Physical Exam   Constitutional: She is oriented to person, place, and time. She appears well-developed and well-nourished.   HENT:   Head: Normocephalic and atraumatic.   Eyes: Conjunctivae are normal. Pupils are equal, round, and reactive to light.   Neck: Normal range of motion. Neck supple.   Cardiovascular: Regular rhythm.    Murmur heard.  2/6 R2ICS   Pulmonary/Chest: Effort normal and breath sounds normal.   Abdominal: Soft. Bowel sounds are normal. There is no tenderness.   Musculoskeletal: She exhibits no edema or tenderness.   Neurological: She is alert and oriented to person, place, and time.   Skin: Skin is warm and dry.       Significant Labs:   CBC:   Recent Labs  Lab 11/29/17 2021 11/30/17 0429   WBC 7.41 6.15   HGB 8.7* 7.9*   HCT 26.3* 24.2*    242     CMP:   Recent Labs  Lab 11/29/17 2021 11/30/17 0429    137   K 3.8 4.4   CL 95 97   CO2 28 25   * 213*   BUN 15 19   CREATININE 4.7* 5.7*   CALCIUM 10.8* 10.6*   PROT 8.1  --    ALBUMIN 3.3*  --    BILITOT 0.4  --    ALKPHOS 124  --    AST 23  --    ALT 14  --    ANIONGAP 17* 15   EGFRNONAA 10* 8*       Significant Imaging: I have reviewed all pertinent imaging results/findings within the past 24 hours.   Imaging Results          X-Ray Chest PA And Lateral (Final result)  Result time 11/29/17 21:16:01    Final result by  Adan Alves MD (11/29/17 21:16:01)                 Impression:        Right upper lobe airspace opacity concerning for pneumonia.      Electronically signed by: ADAN ALVES MD  Date:     11/29/17  Time:    21:16              Narrative:    Technique:  Chest PA and lateral radiographs    Comparison: 2/22/17    Findings:     Left IJ approach dialysis catheter terminates over the right atrium. There is a right upper lobe airspace opacity. Mild central vascular congestion. The mediastinal structures are midline. The cardiac silhouette is normal in size. The osseous structures demonstrate no acute abnormality.                            Assessment/Plan:      * Pneumonia of right upper lobe due to infectious organism    - Rocephin Zithromax day 1  - Follow blood culture.          Acute respiratory failure with hypoxia and hypercarbia    - No respiratory distress on exam.  - No h/o tobacco or oxygen use at home.  - SaO2 low 80s on room air.  - Lungs without wheeze, generally clear.  - Check CTA Chest to eval for PE, and lung appearance.  - By history has only reported a cold for 5 days with fever for last 2 days.   - Consult pulmonology.   - Discussed with Dr. Gonzalez nephrology and patient with get HD that will remove contrast.                 H/O: CVA (cerebrovascular accident)              Type 2 diabetes mellitus with diabetic chronic kidney disease    - States doesn't take Starlix  - diabetic diet  - accuchecks  - SSRI   - last A1C: 9.0 on 2/22/17    - A1C = 7.9          CAD (coronary artery disease) with recent PCI 12/18/2012    - continue ASA   - continue plavix (patient has been taking since stenting in 2012 2/2 2 stents s/p MI)        Hyperlipidemia    - continue atorvastatin 40 mg QD  - last lipid panel:    2/24/2017 05:46   Cholesterol 199   HDL 19 (L)   LDL Cholesterol Invalid, Trig>400.0   Total Cholesterol/HDL Ratio 10.5 (H)   Triglycerides 460 (H)             Hypertension    - continue home  meds    - amlodipine 10 mg QD   - BP moderately elevated upon admission   - hydralazine PRN          Anemia associated with chronic renal failure    - H&H mildly decreased from apparent baseline   - no active bleeding at present  - monitor           ESRD (end stage renal disease) on PD ( initiated dialysis 04/20/2004)    - Nephrology following   - normal dialysis M/W/F            VTE Risk Mitigation         Ordered     heparin (porcine) injection 5,000 Units  Every 8 hours     Route:  Subcutaneous        11/30/17 1653     Medium Risk of VTE  Once      11/30/17 0347     Reason for No Pharmacological VTE Prophylaxis  Once      11/30/17 0347     Place MEERA hose  Until discontinued      11/29/17 2359     Place sequential compression device  Until discontinued      11/29/17 2359              Derek Merida MD  Department of Hospital Medicine   [facility]

## 2017-11-30 NOTE — NURSING
"Pt temp 102.4, pt tachycardic and hypertensive.  It is not time for tylenol as it is only ordered for q8h.  Pt states she "just feels the same as [she has] the last couple days."  Dr. Merida paged, awaiting return call.  Will continue to monitor.  "

## 2017-11-30 NOTE — ASSESSMENT & PLAN NOTE
- States doesn't take Starlix  - diabetic diet  - accuchecks  - SSRI   - last A1C: 9.0 on 2/22/17    - A1C = 7.9

## 2017-11-30 NOTE — ASSESSMENT & PLAN NOTE
- No respiratory distress on exam.  - No h/o tobacco or oxygen use at home.  - SaO2 low 80s on room air.  - Lungs without wheeze, generally clear.  - Check CTA Chest to eval for PE, and lung appearance.  - By history has only reported a cold for 5 days with fever for last 2 days.   - Consult pulmonology.   - Discussed with Dr. Gonzalez nephrology and patient with get HD that will remove contrast.

## 2017-11-30 NOTE — NURSING
Notified Dr. Merida that pt temp still 102.4 after tylenol administration.  No new orders noted.  Will continue to monitor.

## 2017-11-30 NOTE — NURSING
Dr. Merida ordered to change tylenol to q6h and give a dose now.  Readback verification.  Will continue to monitor.

## 2017-11-30 NOTE — ASSESSMENT & PLAN NOTE
- continue ASA   - continue plavix (patient has been taking since stenting in 2012 2/2 2 stents s/p MI)

## 2017-11-30 NOTE — ED NOTES
Report called to Samreen Rn; pt in recliner with no c/o pain at this time; pt eating ice and talking with sister;PIV to right hand WNL with ABX infusing at this time with no redness warmth or infiltration noted; LCW HD access noted with dsg CDI; NADN; VSS will continue to monitor until pt transport to floor

## 2017-11-30 NOTE — ED TRIAGE NOTES
Pt states began having fever Monday.  States her nephrologist wanted her to come to the ER   Pt states having a non-productive cough, a little congestion, and a little bit of a scratchy throat.  Denies N/V/D.  Pt states she no longer makes urine

## 2017-11-30 NOTE — PLAN OF CARE
11/30/17 0316   Vital Signs   Temp (!) 103.1 °F (39.5 °C)   Temp src Oral   Pulse (!) 114   Resp 16   SpO2 (!) 87 %   O2 Device (Oxygen Therapy) room air   BP (!) 174/86     MAICO Pedraza notified. Clonidine and Tylenol given for fever and BP. Respiratory therapist aware of oxygen levels. PRN treatment given along with O2 via NC @ 2lpm.

## 2017-11-30 NOTE — NURSING
Pt temp now 103.1.  Pt still states she feels okay, she is resting comfortably in bed. Dr. Merida paged again.  Awaiting return call.  Will continue to monitor.

## 2017-11-30 NOTE — ED NOTES
Patient Identifiers for Jenni Todd checked and correct  LOC: The patient is awake, alert and aware of environment with an appropriate affect, the patient is oriented x 3 and speaking appropriate.  APPEARANCE: Patient resting comfortably and in no acute distress. The patient is clean and well groomed. The patient's clothing is properly fastened.  SKIN: The skin is warm and dry. The patient has normal skin turgor and moist mucus membranes. No rashes or lesions upon observation. Skin Intact , no breakdown noted.  Musculoskeletal :  Normal range of motion noted. Moves all extremities well, No swelling or tenderness noted  RESPIRATORY: Airway is open and patent, respirations are spontaneous, patient has a normal effort and rate. Pt reports non-productive cough  PULSES: 2+ radial pulses, symmetrical in all extremities.  NEUROLOGIC: PERRL, Motor strength 5/5 all extremities.  The pt's facial expression is symmetrical, patient moving all extremities, normal sensation in all extremities when touched with a finger.The patient is awake, alert and cooperative with a calm affect, patient is aware of environment.    ENT:  Pt reports scratchy throat, some congestion  Will continue to monitor

## 2017-11-30 NOTE — ED PROVIDER NOTES
Encounter Date: 11/29/2017    SCRIBE #1 NOTE: I, Rosalba Fregoso, am scribing for, and in the presence of, Dr. Stallworth .       History     Chief Complaint   Patient presents with    Fever     x 3 days, is a dialysis pt, was dialyzed today, has cough, stuffy nose, sore throat. no N/V/D     Time seen by provider: 7:48 PM    This is a 47 y.o. Female, with a history of CAD, DM, ESRD, FSGS, and HTN, who presents with complaint of fever that began two days ago. Patient reports fever, chills, congestion, rhinorrhea, sore throat, cough, and eye discharge. Patient denies chest pain, shortness of breath, nausea, vomiting, diarrhea, dysuria, myalgias, rash, headache, or confusion. She states her associated symptoms began approximately five days ago. She received dialysis about seven hours ago. On Monday, her temperature was 101 and today it was 101 when taken at the dialysis clinic. Patient receives dialysis on Mondays, Wednesdays, and Fridays. She recently had problems with her port so it was replaced by Dr. Munroe of nephrology. She is compliant with her medications. She denies having any sick contacts.      The history is provided by the patient.     Review of patient's allergies indicates:   Allergen Reactions    Clindamycin Diarrhea    Flagyl [metronidazole hcl]      Past Medical History:   Diagnosis Date    Abnormal finding on Pap smear, HPV DNA positive 2014    Anemia associated with chronic renal failure     on Epogen    Blood type B+     Bulging discs - symptomatic      CAD (coronary artery disease)     Diabetes mellitus, type 2     ESRD (end stage renal disease) 04/20/2004    FSGS (focal segmental glomerulosclerosis)     with collapsing    Hyperlipidemia     Hypertension 2004    Neuropathy     Obesity     Secondary hyperparathyroidism, renal     TIA (transient ischemic attack)     Uterine fibroid     small uterine      Past Surgical History:   Procedure Laterality Date    CARDIAC CATHETERIZATION       PCI x 2     SECTION, CLASSIC      DIALYSIS FISTULA CREATION      multiple fistulas and grafts before PD     INCISION AND DRAINAGE OF WOUND Left 2016    VULVAR ABCESS WITH NECROSIS    PERITONEAL CATHETER INSERTION      UMBILICAL HERNIA REPAIR       Family History   Problem Relation Age of Onset    Cancer Maternal Grandmother     Cancer Paternal Grandfather     Diabetes Maternal Aunt     Diabetes Paternal Aunt     Kidney disease Neg Hx     Heart disease Neg Hx     Ovarian cancer Neg Hx     Breast cancer Neg Hx      Social History   Substance Use Topics    Smoking status: Never Smoker    Smokeless tobacco: Never Used    Alcohol use No      Comment: Pt reports some social use of about 1-2 drinks about every six months.     Review of Systems   Constitutional: Positive for chills and fever.   HENT: Positive for congestion, rhinorrhea and sore throat.    Eyes: Positive for discharge.   Respiratory: Positive for cough. Negative for shortness of breath.    Cardiovascular: Negative for chest pain.   Gastrointestinal: Negative for nausea and vomiting.   Genitourinary: Negative for dysuria.   Musculoskeletal: Negative for myalgias.   Skin: Negative for rash.   Neurological: Negative for headaches.   Psychiatric/Behavioral: Negative for confusion.       Physical Exam     Initial Vitals [17 1854]   BP Pulse Resp Temp SpO2   (!) 180/93 102 20 99.8 °F (37.7 °C) 97 %      MAP       122         Physical Exam    Nursing note and vitals reviewed.  Constitutional: She appears well-developed and well-nourished. She is not diaphoretic. No distress.   HENT:   Head: Normocephalic and atraumatic.   Mouth/Throat: Oropharynx is clear and moist. No oropharyngeal exudate.   Eyes: Conjunctivae and EOM are normal. Pupils are equal, round, and reactive to light. No scleral icterus.   Neck: Normal range of motion. Neck supple.   Cardiovascular: Normal rate, regular rhythm, S1 normal, S2 normal and normal heart sounds.  Exam reveals no gallop and no friction rub.    No murmur heard.  Pulmonary/Chest: Breath sounds normal. No stridor. No respiratory distress. She has no wheezes. She has no rhonchi. She has no rales.   Abdominal: Soft. Bowel sounds are normal. There is no tenderness. There is no rebound and no guarding.   Musculoskeletal: Normal range of motion. She exhibits no edema or tenderness.   No lower extremity edema.    Lymphadenopathy:     She has no cervical adenopathy.   Neurological: She is alert and oriented to person, place, and time.   Skin: Skin is warm and dry. Capillary refill takes less than 2 seconds. No rash noted. No pallor.   No evidence of infection around the site of port. No erythema. No cellulitis. No abscess formation.    Psychiatric: She has a normal mood and affect. Her behavior is normal. Judgment and thought content normal.         ED Course   Procedures  Labs Reviewed   CBC W/ AUTO DIFFERENTIAL - Abnormal; Notable for the following:        Result Value    RBC 2.66 (*)     Hemoglobin 8.7 (*)     Hematocrit 26.3 (*)     MCV 99 (*)     MCH 32.7 (*)     All other components within normal limits   COMPREHENSIVE METABOLIC PANEL - Abnormal; Notable for the following:     Glucose 218 (*)     Creatinine 4.7 (*)     Calcium 10.8 (*)     Albumin 3.3 (*)     Anion Gap 17 (*)     eGFR if  12 (*)     eGFR if non  10 (*)     All other components within normal limits   THROAT SCREEN, RAPID   INFLUENZA A AND B ANTIGEN     Imaging Results          CTA Chest Non-Coronary (In process)                X-Ray Chest PA And Lateral (Final result)  Result time 11/29/17 21:16:01    Final result by Adan Alves MD (11/29/17 21:16:01)                 Impression:        Right upper lobe airspace opacity concerning for pneumonia.      Electronically signed by: ADAN ALVES MD  Date:     11/29/17  Time:    21:16              Narrative:    Technique:  Chest PA and lateral  radiographs    Comparison: 2/22/17    Findings:     Left IJ approach dialysis catheter terminates over the right atrium. There is a right upper lobe airspace opacity. Mild central vascular congestion. The mediastinal structures are midline. The cardiac silhouette is normal in size. The osseous structures demonstrate no acute abnormality.                                    Medical Decision Making:   Independently Interpreted Test(s):   I have ordered and independently interpreted X-rays - see prior notes.  Clinical Tests:   Lab Tests: Ordered and Reviewed  Radiological Study: Ordered and Reviewed              Attending Attestation:           Physician Attestation for Scribe:  Physician Attestation Statement for Scribe #1: I, Dr. Stallworth, reviewed documentation, as scribed by Rosalba Fregoso in my presence, and it is both accurate and complete.         Attending ED Notes:   Emergent evaluation a 47-year-old female with cough, nasal congestion, body aches, fevers and chills.  Patient is febrile, nontoxic-appearing with elevation in heart rate and oxygen saturation of 94% on room air.  Patient is no elevation of white blood cell count.  H&H is 8.7 and 26.3.  Patient has a history of anemia.  BUN/creatinine are 15 and 4.7.  Patient has a history of end-stage renal disease and is on dialysis.  Calcium is 10.8.  Blood glucose is 218.  Influenza screen is negative.  Strep screen is negative.  Chest x-ray reveals right upper lobe airspace opacity concerning for pneumonia.  Blood cultures are drawn.  IV antibiotics are administered.  The patient is extensively counseled on her diagnosis and treatment including all diagnostic, laboratory and physical exam findings.  The patient is admitted in stable condition.          ED Course      Clinical Impression:     1. Pneumonia of right upper lobe due to infectious organism    2. Cough    3. ESRD on dialysis    4. Anemia, unspecified type    5. Hypercalcemia                                  Rodrigue Iglesias MD  11/30/17 7047

## 2017-11-30 NOTE — PROGRESS NOTES
MAICO Pedraza notified. PRN Hydralazine ordered.       11/29/17 2352   Vital Signs   Temp 99.5 °F (37.5 °C)   Temp src Oral   Pulse 102   Heart Rate Source Monitor   Resp 16   SpO2 96 %   O2 Device (Oxygen Therapy) room air   BP (!) 182/88   Patient Position Lying

## 2017-11-30 NOTE — H&P
[facility] Hospital Medicine  History & Physical    Patient Name: Jenni Todd  MRN: [id number]  Admission Date: 11/29/2017  Attending Physician: Derek Merida MD   Primary Care Provider: Michael Tan Iii, MD         Patient information was obtained from patient, past medical records and ER records.     Subjective:     Principal Problem:Pneumonia of right upper lobe due to infectious organism    Chief Complaint:   Chief Complaint   Patient presents with    Fever     x 3 days, is a dialysis pt, was dialyzed today, has cough, stuffy nose, sore throat. no N/V/D        HPI: Ms. Jenni Todd is a 47 y.o. female, with PMH of CAD, NIDDM-II, ESRD on dialysis (M/WF, Dr. Munroe; with recent Tevin catheter placement 2 weeks ago), anemia of chronic disease, and HTN, who presented to [facility] ED on 11/29/17 2/2 fever x 2 days. TMAX of 102.2 F, when taken at dialysis the past two sessions temp has been 101.0 F PO. Associated symptoms include chills, congestion, rhinorrhea, sore throat, cough, and eye discharge which all began 5 days ago. She denies chest pain, shortness of breath, nausea, vomiting, diarrhea, dysuria, myalgias, rash, headache, or confusion.      The patient was evaluated in the ED, and diagnosed with RUL PNA, started on ABX, and admitted for further management.     Past Medical History:   Diagnosis Date    Abnormal finding on Pap smear, HPV DNA positive 2014    Anemia associated with chronic renal failure     on Epogen    Blood type B+     Bulging discs - symptomatic      CAD (coronary artery disease)     Diabetes mellitus, type 2     ESRD (end stage renal disease) 04/20/2004    FSGS (focal segmental glomerulosclerosis)     with collapsing    Hyperlipidemia     Hypertension 2004    Neuropathy     Obesity     Secondary hyperparathyroidism, renal     TIA (transient ischemic attack)     Uterine fibroid     small uterine        Past Surgical History:   Procedure  Laterality Date    CARDIAC CATHETERIZATION      PCI x 2     SECTION, CLASSIC      DIALYSIS FISTULA CREATION      multiple fistulas and grafts before PD     INCISION AND DRAINAGE OF WOUND Left 2016    VULVAR ABCESS WITH NECROSIS    PERITONEAL CATHETER INSERTION      UMBILICAL HERNIA REPAIR         Review of patient's allergies indicates:   Allergen Reactions    Clindamycin Diarrhea    Flagyl [metronidazole hcl]        No current facility-administered medications on file prior to encounter.      Current Outpatient Prescriptions on File Prior to Encounter   Medication Sig    amlodipine (NORVASC) 10 MG tablet 10 mg once daily.     aspirin (ECOTRIN) 81 MG EC tablet Take 1 tablet (81 mg total) by mouth once daily.    atorvastatin (LIPITOR) 40 MG tablet Take 1 tablet (40 mg total) by mouth every evening.    calcitRIOL (ROCALTROL) 0.5 MCG Cap Take 1 capsule by mouth 2 (two) times daily.     cilostazol (PLETAL) 50 MG Tab Take 1 tablet (50 mg total) by mouth 2 (two) times daily.    cinacalcet (SENSIPAR) 90 MG Tab Take 1 tablet by mouth once daily.     epoetin adonay 10,000 unit/mL Soln 1 mL, epoetin adonay 20,000 unit/mL Soln 1 mL Inject 30,000 Units into the vein every 14 (fourteen) days.    gabapentin (NEURONTIN) 300 MG capsule 3 CAPS PM,AM,NOON THEN 3 CAPS PM, 3CAPS AM & 2 NOON THEN INCREASE TO 3 CAPS 3 TIMES A DAY    lanthanum (FOSRENOL) 1000 MG chewable tablet Take 1,000 mg by mouth 3 (three) times daily with meals.      nateglinide (STARLIX) 120 MG tablet STARLIX 120MG 30 MINUTES BEFORE EACH MEAL.    ONETOUCH VERIO Strp USE 1 STRIP TWICE DAILY IN VITRO    PLAVIX 75 mg tablet TAKE 1 BY MOUTH DAILY    vitamin renal formula, B-complex-vitamin c-folic acid, (B COMPLEX-C-FOLIC ACID) 1 mg Cap Take 1 capsule by mouth once daily. 1 Capsule Oral Every day     Family History     Problem Relation (Age of Onset)    Cancer Maternal Grandmother, Paternal Grandfather    Diabetes Maternal Aunt, Paternal Aunt         Social History Main Topics    Smoking status: Never Smoker    Smokeless tobacco: Never Used    Alcohol use No      Comment: Pt reports some social use of about 1-2 drinks about every six months.    Drug use: No    Sexual activity: Not Currently     Partners: Male     Birth control/ protection: None     Review of Systems   Constitutional: Positive for fatigue and fever. Negative for activity change, chills and diaphoresis.   HENT: Positive for sore throat. Negative for congestion, ear pain, postnasal drip, rhinorrhea, sinus pressure and trouble swallowing.    Respiratory: Positive for cough. Negative for chest tightness, shortness of breath and wheezing.    Cardiovascular: Negative for chest pain, palpitations and leg swelling.   Gastrointestinal: Negative for abdominal pain, diarrhea, nausea and vomiting.   Musculoskeletal: Positive for myalgias. Negative for arthralgias and neck stiffness.   Skin: Negative for color change, pallor, rash and wound.   Neurological: Negative for dizziness, weakness, light-headedness and headaches.     Objective:     Vital Signs (Most Recent):  Temp: (!) 103.1 °F (39.5 °C) (11/30/17 0316)  Pulse: 110 (11/30/17 0317)  Resp: 14 (11/30/17 0317)  BP: (!) 174/86 (11/30/17 0316)  SpO2: (!) 92 % (11/30/17 0317) Vital Signs (24h Range):  Temp:  [99.5 °F (37.5 °C)-103.1 °F (39.5 °C)] 103.1 °F (39.5 °C)  Pulse:  [102-116] 110  Resp:  [14-20] 14  SpO2:  [87 %-98 %] 92 %  BP: (130-194)/(75-93) 174/86     Weight: 93 kg (205 lb)  Body mass index is 32.11 kg/m².    Physical Exam   Constitutional: She is oriented to person, place, and time. She appears well-developed and well-nourished. No distress.   Appears stated age, NAD, recumbent in bed. Warm to the touch.    HENT:   Head: Normocephalic and atraumatic.   Eyes: Conjunctivae and EOM are normal. Pupils are equal, round, and reactive to light. No scleral icterus.   Neck: Normal range of motion. Neck supple. No JVD present. No tracheal  deviation present.   Cardiovascular: Normal rate, regular rhythm, normal heart sounds and intact distal pulses.  Exam reveals no gallop and no friction rub.    No murmur heard.  2+ distal radial/DT/PT pulses. No carotid bruits.     Pulmonary/Chest: Effort normal and breath sounds normal. No stridor. No respiratory distress. She has no wheezes. She has no rales.   Abdominal: Soft. Bowel sounds are normal. She exhibits no distension. There is no tenderness. There is no guarding.   Musculoskeletal: Normal range of motion. She exhibits no deformity.   Neurological: She is alert and oriented to person, place, and time. No cranial nerve deficit. She exhibits normal muscle tone.   Skin: Skin is warm and dry. Capillary refill takes less than 2 seconds. She is not diaphoretic. No erythema.   Dialysis access site without erythema, discharge, tenderness or induration.    Psychiatric: She has a normal mood and affect. Her behavior is normal. Judgment and thought content normal.   Nursing note and vitals reviewed.        CRANIAL NERVES     CN III, IV, VI   Pupils are equal, round, and reactive to light.  Extraocular motions are normal.        Significant Labs:   BMP:   Recent Labs  Lab 11/29/17 2021   *      K 3.8   CL 95   CO2 28   BUN 15   CREATININE 4.7*   CALCIUM 10.8*     CBC:   Recent Labs  Lab 11/29/17 2021   WBC 7.41   HGB 8.7*   HCT 26.3*        CMP:   Recent Labs  Lab 11/29/17 2021      K 3.8   CL 95   CO2 28   *   BUN 15   CREATININE 4.7*   CALCIUM 10.8*   PROT 8.1   ALBUMIN 3.3*   BILITOT 0.4   ALKPHOS 124   AST 23   ALT 14   ANIONGAP 17*   EGFRNONAA 10*     All pertinent labs within the past 24 hours have been reviewed.    Significant Imaging: I have reviewed all pertinent imaging results/findings within the past 24 hours.     Imaging Results          X-Ray Chest PA And Lateral (Final result)  Result time 11/29/17 21:16:01    Final result by Adan Alves MD (11/29/17  21:16:01)                 Impression:        Right upper lobe airspace opacity concerning for pneumonia.      Electronically signed by: JEFF ESTRADA MD  Date:     11/29/17  Time:    21:16              Narrative:    Technique:  Chest PA and lateral radiographs    Comparison: 2/22/17    Findings:     Left IJ approach dialysis catheter terminates over the right atrium. There is a right upper lobe airspace opacity. Mild central vascular congestion. The mediastinal structures are midline. The cardiac silhouette is normal in size. The osseous structures demonstrate no acute abnormality.                               Assessment/Plan:     * Pneumonia of right upper lobe due to infectious organism    - continue antibiotics  - nebs PRN for symptomatic relief           Type 2 diabetes mellitus with diabetic chronic kidney disease    - hold oral antidibetic meds  - diabetic diet  - accuchecks  - SSRI   - last A1C: 9.0 on 2/22/17    - A1C pending for AM          CAD (coronary artery disease) with recent PCI 12/18/2012    - continue ASA   - continue plavix (patient has been taking since stenting in 2012 2/2 2 stents s/p MI)        Hyperlipidemia    - continue atorvastatin 40 mg QD  - last lipid panel:    2/24/2017 05:46   Cholesterol 199   HDL 19 (L)   LDL Cholesterol Invalid, Trig>400.0   Total Cholesterol/HDL Ratio 10.5 (H)   Triglycerides 460 (H)             Hypertension    - continue home meds    - amlodipine 10 mg QD   - BP moderately elevated upon admission   - hydralazine PRN          Anemia associated with chronic renal failure    - H&H mildly decreased from apparent baseline   - no active bleeding at present  - monitor           ESRD (end stage renal disease) on PD ( initiated dialysis 04/20/2004)    - Nephrology consulted   - normal dialysis M/W/F            VTE Risk Mitigation         Ordered     Medium Risk of VTE  Once      11/30/17 0347     Reason for No Pharmacological VTE Prophylaxis  Once      11/30/17 0347      Place MEERA hose  Until discontinued      11/29/17 2352     Place sequential compression device  Until discontinued      11/29/17 3729             Keila Carvalho PA-C  Department of Hospital Medicine   [facility]

## 2017-11-30 NOTE — PLAN OF CARE
DAD# [id number]  ---------------------------  ATTN: TEAM DC PLANNING   NO NEEDS VOICED      DME OWNS :ELECTRIC SCOOTER'    IF ANY OTHER NEEDS ARISE PLEASE CONTACT RN CM / SSW ( RENYALDO ON CASE ALSO # [id number] )     Maddie Martinez RN  Case management 11/30/201712:28 PM  # [phone] (FAX) [phone]       11/30/17 1227   Discharge Assessment   Assessment Type Discharge Planning Assessment   Confirmed/corrected address and phone number on facesheet? Yes   Assessment information obtained from? Patient;Other  (FATHER )   Communicated expected length of stay with patient/caregiver no   Prior to hospitilization cognitive status: Alert/Oriented   Prior to hospitalization functional status: Independent;Assistive Equipment   Current cognitive status: Alert/Oriented   Current Functional Status: Independent;Assistive Equipment   Lives With child(crystal), adult   Able to Return to Prior Arrangements yes   Is patient able to care for self after discharge? No   Who are your caregiver(s) and their phone number(s)? FATHER   Patient currently being followed by outpatient case management? No   Patient currently receives home health services? No   Patient currently receives any other outside agency services? No   Equipment Currently Used at Home none   Do you have any problems affording any of your prescribed medications? TBD   Is the patient taking medications as prescribed? yes   Transportation Available car   Does the patient receive services at the [facility]? No   Discharge Plan A Home with family   Discharge Plan B Home with family   Patient/Family In Agreement With Plan yes   Does the patient currently use HME? No   Does the patient receive outpatient dialysis? No   Are there any open cases? No

## 2017-11-30 NOTE — ASSESSMENT & PLAN NOTE
- continue home meds    - amlodipine 10 mg QD   - BP moderately elevated upon admission   - hydralazine PRN

## 2017-12-01 ENCOUNTER — TELEPHONE (OUTPATIENT)
Dept: CARDIOLOGY | Facility: CLINIC | Age: 47
End: 2017-12-01

## 2017-12-01 LAB
ANION GAP SERPL CALC-SCNC: 15 MMOL/L
BASOPHILS # BLD AUTO: 0.03 K/UL
BASOPHILS NFR BLD: 0.4 %
BUN SERPL-MCNC: 28 MG/DL
CALCIUM SERPL-MCNC: 9.4 MG/DL
CHLORIDE SERPL-SCNC: 96 MMOL/L
CO2 SERPL-SCNC: 25 MMOL/L
CREAT SERPL-MCNC: 8.3 MG/DL
DIFFERENTIAL METHOD: ABNORMAL
EOSINOPHIL # BLD AUTO: 0.1 K/UL
EOSINOPHIL NFR BLD: 0.8 %
ERYTHROCYTE [DISTWIDTH] IN BLOOD BY AUTOMATED COUNT: 13.9 %
EST. GFR  (AFRICAN AMERICAN): 6 ML/MIN/1.73 M^2
EST. GFR  (NON AFRICAN AMERICAN): 5 ML/MIN/1.73 M^2
GLUCOSE SERPL-MCNC: 153 MG/DL
HBV SURFACE AG SERPL QL IA: NEGATIVE
HCT VFR BLD AUTO: 24.9 %
HGB BLD-MCNC: 8.1 G/DL
LYMPHOCYTES # BLD AUTO: 3.1 K/UL
LYMPHOCYTES NFR BLD: 42 %
MAGNESIUM SERPL-MCNC: 1.9 MG/DL
MCH RBC QN AUTO: 32.1 PG
MCHC RBC AUTO-ENTMCNC: 32.5 G/DL
MCV RBC AUTO: 99 FL
MONOCYTES # BLD AUTO: 0.5 K/UL
MONOCYTES NFR BLD: 6 %
NEUTROPHILS # BLD AUTO: 3.8 K/UL
NEUTROPHILS NFR BLD: 50.7 %
PHOSPHATE SERPL-MCNC: 6.3 MG/DL
PLATELET # BLD AUTO: 231 K/UL
PMV BLD AUTO: 11 FL
POCT GLUCOSE: 127 MG/DL (ref 70–110)
POCT GLUCOSE: 197 MG/DL (ref 70–110)
POCT GLUCOSE: 197 MG/DL (ref 70–110)
POCT GLUCOSE: 200 MG/DL (ref 70–110)
POTASSIUM SERPL-SCNC: 4.5 MMOL/L
RBC # BLD AUTO: 2.52 M/UL
SODIUM SERPL-SCNC: 136 MMOL/L
VANCOMYCIN SERPL-MCNC: 37.8 UG/ML
WBC # BLD AUTO: 7.48 K/UL

## 2017-12-01 PROCEDURE — 99225 PR SUBSEQUENT OBSERVATION CARE,LEVEL II: CPT | Mod: NTX,,, | Performed by: HOSPITALIST

## 2017-12-01 PROCEDURE — 84100 ASSAY OF PHOSPHORUS: CPT | Mod: NTX

## 2017-12-01 PROCEDURE — 63600175 PHARM REV CODE 636 W HCPCS: Mod: NTX | Performed by: HOSPITALIST

## 2017-12-01 PROCEDURE — 25000003 PHARM REV CODE 250: Mod: NTX | Performed by: HOSPITALIST

## 2017-12-01 PROCEDURE — 87340 HEPATITIS B SURFACE AG IA: CPT | Mod: NTX

## 2017-12-01 PROCEDURE — 80202 ASSAY OF VANCOMYCIN: CPT | Mod: NTX

## 2017-12-01 PROCEDURE — 25000003 PHARM REV CODE 250: Mod: NTX | Performed by: PHYSICIAN ASSISTANT

## 2017-12-01 PROCEDURE — 27000221 HC OXYGEN, UP TO 24 HOURS: Mod: NTX

## 2017-12-01 PROCEDURE — 94640 AIRWAY INHALATION TREATMENT: CPT | Mod: NTX

## 2017-12-01 PROCEDURE — 80100016 HC MAINTENANCE HEMODIALYSIS: Mod: NTX

## 2017-12-01 PROCEDURE — 86706 HEP B SURFACE ANTIBODY: CPT | Mod: NTX

## 2017-12-01 PROCEDURE — 85025 COMPLETE CBC W/AUTO DIFF WBC: CPT | Mod: NTX

## 2017-12-01 PROCEDURE — 80048 BASIC METABOLIC PNL TOTAL CA: CPT | Mod: NTX

## 2017-12-01 PROCEDURE — 94761 N-INVAS EAR/PLS OXIMETRY MLT: CPT | Mod: NTX

## 2017-12-01 PROCEDURE — 83735 ASSAY OF MAGNESIUM: CPT | Mod: NTX

## 2017-12-01 PROCEDURE — 36415 COLL VENOUS BLD VENIPUNCTURE: CPT | Mod: NTX

## 2017-12-01 PROCEDURE — 11000001 HC ACUTE MED/SURG PRIVATE ROOM: Mod: NTX

## 2017-12-01 PROCEDURE — 25000242 PHARM REV CODE 250 ALT 637 W/ HCPCS: Mod: NTX | Performed by: HOSPITALIST

## 2017-12-01 PROCEDURE — 25000242 PHARM REV CODE 250 ALT 637 W/ HCPCS: Mod: NTX | Performed by: PHYSICIAN ASSISTANT

## 2017-12-01 RX ORDER — OSELTAMIVIR PHOSPHATE 6 MG/ML
30 FOR SUSPENSION ORAL EVERY OTHER DAY
Status: DISCONTINUED | OUTPATIENT
Start: 2017-12-01 | End: 2017-12-03 | Stop reason: HOSPADM

## 2017-12-01 RX ADMIN — ALBUTEROL SULFATE 1.25 MG: 2.5 SOLUTION RESPIRATORY (INHALATION) at 07:12

## 2017-12-01 RX ADMIN — GUAIFENESIN 200 MG: 100 SOLUTION ORAL at 05:12

## 2017-12-01 RX ADMIN — PIPERACILLIN SODIUM AND TAZOBACTAM SODIUM 2.25 G: 2; .25 INJECTION, POWDER, FOR SOLUTION INTRAVENOUS at 06:12

## 2017-12-01 RX ADMIN — ACETAMINOPHEN 650 MG: 325 TABLET ORAL at 03:12

## 2017-12-01 RX ADMIN — GUAIFENESIN 200 MG: 100 SOLUTION ORAL at 12:12

## 2017-12-01 RX ADMIN — CALCITRIOL 0.5 MCG: 0.25 CAPSULE, LIQUID FILLED ORAL at 07:12

## 2017-12-01 RX ADMIN — PIPERACILLIN SODIUM AND TAZOBACTAM SODIUM 2.25 G: 2; .25 INJECTION, POWDER, FOR SOLUTION INTRAVENOUS at 05:12

## 2017-12-01 RX ADMIN — CLOPIDOGREL 75 MG: 75 TABLET, FILM COATED ORAL at 07:12

## 2017-12-01 RX ADMIN — ALBUTEROL SULFATE 1.25 MG: 2.5 SOLUTION RESPIRATORY (INHALATION) at 01:12

## 2017-12-01 RX ADMIN — HEPARIN SODIUM 5000 UNITS: 5000 INJECTION, SOLUTION INTRAVENOUS; SUBCUTANEOUS at 12:12

## 2017-12-01 RX ADMIN — ASPIRIN 81 MG: 81 TABLET, COATED ORAL at 07:12

## 2017-12-01 RX ADMIN — CINACALCET HYDROCHLORIDE 90 MG: 30 TABLET, COATED ORAL at 07:12

## 2017-12-01 RX ADMIN — HEPARIN SODIUM 5000 UNITS: 5000 INJECTION, SOLUTION INTRAVENOUS; SUBCUTANEOUS at 05:12

## 2017-12-01 RX ADMIN — CALCITRIOL 0.5 MCG: 0.25 CAPSULE, LIQUID FILLED ORAL at 09:12

## 2017-12-01 RX ADMIN — OSELTAMIVIR PHOSPHATE 30 MG: 6 POWDER, FOR SUSPENSION ORAL at 08:12

## 2017-12-01 RX ADMIN — GUAIFENESIN 200 MG: 100 SOLUTION ORAL at 11:12

## 2017-12-01 RX ADMIN — Medication 1 CAPSULE: at 07:12

## 2017-12-01 RX ADMIN — ATORVASTATIN CALCIUM 40 MG: 20 TABLET, FILM COATED ORAL at 09:12

## 2017-12-01 RX ADMIN — ACETAMINOPHEN 650 MG: 325 TABLET ORAL at 07:12

## 2017-12-01 RX ADMIN — IPRATROPIUM BROMIDE AND ALBUTEROL SULFATE 3 ML: .5; 3 SOLUTION RESPIRATORY (INHALATION) at 09:12

## 2017-12-01 NOTE — PLAN OF CARE
Problem: Patient Care Overview  Goal: Plan of Care Review  Outcome: Ongoing (interventions implemented as appropriate)  Patient lying quietly in bed with eyes closed, television on. No distress or discomfort noted. Sinus rhythm on telemetry. No urine or stool noted during shift. Call light within reach. Encouraged patient to call for any and all needs. Patient verbalized understanding of instructions. Will continue to monitor patient.

## 2017-12-01 NOTE — NURSING
"Upon getting vitals, patient's SpO2 86% on 3L NC. Pt denies SOB.  Pt encouraged to take deep breaths through nose.  NC increased to 4L but SpO2 did not change.  Increased NC to 5L, SpO2 increased to 88%.  Nancy RRT called and she came and gave respiratory treatment.  SpO2 on 5L NC now 92%.  Nancy RRT states she will get pt a venti mask.  Tamela in dialysis notified of situation, Tamela states she is still sending for pt to get HD now once venti mask is in place.  Dr. Merida paged to notify him of situation, awaiting return call.  Pt states "I don't feel good but I don't feel any worse than yesterday."  Nancy RRT at bedside setting up venti mask.  Will continue to monitor.  "

## 2017-12-01 NOTE — SUBJECTIVE & OBJECTIVE
Interval History:   States feeling and breathing a little better.      Review of Systems   Constitutional: Positive for fatigue and fever. Negative for diaphoresis.   Eyes: Negative for visual disturbance.   Respiratory: Positive for cough and shortness of breath.    Cardiovascular: Negative for chest pain and palpitations.   Gastrointestinal: Negative for abdominal pain and nausea.   Genitourinary: Negative for difficulty urinating and dysuria.   Musculoskeletal: Negative for arthralgias and myalgias.   Skin: Negative for color change and rash.   Neurological: Negative for dizziness and numbness.   Psychiatric/Behavioral: Negative for agitation and confusion.     Objective:     Vital Signs (Most Recent):  Temp: 100.2 °F (37.9 °C) (12/01/17 0900)  Pulse: 89 (12/01/17 1130)  Resp: 18 (12/01/17 1130)  BP: 119/63 (12/01/17 1130)  SpO2: (!) 92 % (12/01/17 0755) Vital Signs (24h Range):  Temp:  [99 °F (37.2 °C)-103.2 °F (39.6 °C)] 100.2 °F (37.9 °C)  Pulse:  [] 89  Resp:  [16-20] 18  SpO2:  [87 %-97 %] 92 %  BP: (114-186)/(58-88) 119/63     Weight: 93 kg (205 lb)  Body mass index is 32.11 kg/m².    Intake/Output Summary (Last 24 hours) at 12/01/17 1217  Last data filed at 12/01/17 0626   Gross per 24 hour   Intake              220 ml   Output                0 ml   Net              220 ml      Physical Exam   Constitutional: She is oriented to person, place, and time. She appears well-developed and well-nourished.   HENT:   Head: Normocephalic and atraumatic.   Eyes: Conjunctivae are normal. Pupils are equal, round, and reactive to light.   Neck: Normal range of motion. Neck supple.   Cardiovascular: Regular rhythm.    Murmur heard.  2/6 R2ICS   Pulmonary/Chest: Effort normal and breath sounds normal.   Abdominal: Soft. Bowel sounds are normal. There is no tenderness.   Musculoskeletal: She exhibits no edema or tenderness.   Neurological: She is alert and oriented to person, place, and time.   Skin: Skin is warm and  dry.       Significant Labs:   BMP:   Recent Labs  Lab 12/01/17 0427   *      K 4.5   CL 96   CO2 25   BUN 28*   CREATININE 8.3*   CALCIUM 9.4   MG 1.9     CBC:   Recent Labs  Lab 11/29/17 2021 11/30/17 0429 12/01/17 0427   WBC 7.41 6.15 7.48   HGB 8.7* 7.9* 8.1*   HCT 26.3* 24.2* 24.9*    242 231       Significant Imaging: I have reviewed all pertinent imaging results/findings within the past 24 hours.   Imaging Results          CTA Chest Non-Coronary (Final result)  Result time 11/30/17 22:30:35    Final result by Regina Sahu MD (11/30/17 22:30:35)                 Impression:      1.  No evidence of pulmonary thromboembolism.    2.  Multiple patchy and consolidative opacities throughout the right lung, with the largest and most confluent component in the right upper lobe.  Findings concerning for underlying infectious process such as pneumonia and/or aspiration.  There a few patchy opacities in the lingula.  Consider repeat evaluation with noncontrast chest CT in 3 months.    3.  Calcified right pleural plaques, likely reflecting sequela of prior asbestos exposure.    4.  Mediastinal and right hilar adenopathy, likely reactive in etiology.    5.  Atherosclerosis of the aorta and coronary vessels as well as calcification of the aortic valve.            Electronically signed by: REGINA SAHU  Date:     11/30/17  Time:    22:30              Narrative:    TECHNIQUE: The chest was surveyed from the costophrenic angles through the lung apices at 3-mm increments after the administration of 75 cc of Omnipaque 350 intravenous contrast material according to the PE protocol.  Axial, sagittal and coronal maximum intensity projection images were reviewed.    COMPARISON:Chest radiograph 11/29/2017.    FINDINGS:  There is a left-sided central venous catheter in place with tip terminating in the right atrium.  Remaining visualized vascular and soft tissue structures at the base of the neck are within  normal limits.  The heart is not enlarged.  There is no significant pericardial fluid.  There is calcific atherosclerosis of the coronary vessels.  The thoracic aorta is non-aneurysmal with moderate atherosclerosis.  There is calcification of the aortic valve.    There normal size axillary lymph nodes present.  There are prominent and enlarged mediastinal lymph nodes present including a precarinal lymph node measuring approximately 1.7 cm in short axis diameter.  There is also a conglomerate of subcarinal adenopathy and mild right hilar adenopathy.    The pulmonary arteries distributed normally.  There is no filling defects to indicate pulmonary thromboembolism.    The trachea and proximal airways are patent.  There are multiple patchy and consolidative opacities throughout the right upper, middle and lower lobes, with the most prominent component in the right upper lobe.  Overall, findings are concerning for underlying infectious process such as pneumonia or aspiration.  Clinical correlation recommended.  Consider reevaluation with noncontrast chest CT in 3 months.  The left lung demonstrates a few patchy nodular opacities in the lingula.  There is calcified right pleural plaque.  No pleural fluid or pneumothorax.    Incidentally visualized structures of the abdomen demonstrate significant atherosclerosis of the visceral vascular structures.  Visualized osseous structures demonstrate mild degenerative change without acute abnormality.                             X-Ray Chest PA And Lateral (Final result)  Result time 11/29/17 21:16:01    Final result by Adan Alves MD (11/29/17 21:16:01)                 Impression:        Right upper lobe airspace opacity concerning for pneumonia.      Electronically signed by: ADAN ALVES MD  Date:     11/29/17  Time:    21:16              Narrative:    Technique:  Chest PA and lateral radiographs    Comparison: 2/22/17    Findings:     Left IJ approach dialysis  catheter terminates over the right atrium. There is a right upper lobe airspace opacity. Mild central vascular congestion. The mediastinal structures are midline. The cardiac silhouette is normal in size. The osseous structures demonstrate no acute abnormality.

## 2017-12-01 NOTE — ASSESSMENT & PLAN NOTE
- Vanc, Zosyn, Zithromax day 2  - Influenza swab neg, but will give Tamiflu renally dosed.  - Follow blood culture.

## 2017-12-01 NOTE — ASSESSMENT & PLAN NOTE
- No respiratory distress on exam.  - No h/o tobacco or oxygen use at home.  - SaO2 low 80s on room air.  - Lungs without wheeze, generally clear.  - CTA Chest no evidence for PE.  - By history has only reported a cold for 5 days with fever for last 2 days.   - Pulmonology follwing.

## 2017-12-01 NOTE — PLAN OF CARE
Problem: Patient Care Overview  Goal: Plan of Care Review  Outcome: Ongoing (interventions implemented as appropriate)  Pt in no distress on 3L [place], tx given and tolerated well. Will continue to monitor.

## 2017-12-01 NOTE — PROGRESS NOTES
"Nephrology  Progress Note    Admit Date: 11/29/2017   LOS: 0 days     SUBJECTIVE:     Follow-up For:  Pneumonia of right upper lobe due to infectious organism/ESRD    Interval History:     Less SOB this am.  Discussed with Dr. Merida/Dr. Escamilla.  Seen on HD without complaints.  No CP.      Review of Systems:  Constitutional: No fever or chills  Respiratory:  shortness of breath  Cardiovascular: No chest pain or palpitations  Gastrointestinal: No nausea or vomiting  Neurological: No confusion or weakness    OBJECTIVE:     Vital Signs Range (Last 24H):  BP (!) 136/58   Pulse 97   Temp 100.2 °F (37.9 °C) (Oral)   Resp 18   Ht 5' 7" (1.702 m)   Wt 93 kg (205 lb)   LMP 01/28/2014 (Approximate)   SpO2 (!) 92%   Breastfeeding? No   BMI 32.11 kg/m²     Temp:  [99 °F (37.2 °C)-103.2 °F (39.6 °C)]   Pulse:  []   Resp:  [16-20]   BP: (114-186)/(58-88)   SpO2:  [87 %-97 %]     I & O (Last 24H):  Intake/Output Summary (Last 24 hours) at 12/01/17 1102  Last data filed at 12/01/17 0626   Gross per 24 hour   Intake              220 ml   Output                0 ml   Net              220 ml       Physical Exam:  General appearance: Well developed, well nourished  Eyes:  Conjunctivae/corneas clear. PERRL.  Lungs: Normal respiratory effort,   Min rales.   Heart: Regular rate and rhythm, S1, S2 normal, no murmur, rub or beau.  Abdomen: Soft, non-tender non-distended; bowel sounds normal; no masses,  no organomegaly, obese  Extremities: No cyanosis or clubbing. No edema.    Skin: Skin color, texture, turgor normal. No rashes or lesions  Neurologic: Normal strength and tone. No focal numbness or weakness   Left IJ ERNIE    Laboratory Data:    Recent Labs  Lab 12/01/17 0427   WBC 7.48   RBC 2.52*   HGB 8.1*   HCT 24.9*      MCV 99*   MCH 32.1*   MCHC 32.5       BMP:   Recent Labs  Lab 12/01/17 0427   *      K 4.5   CL 96   CO2 25   BUN 28*   CREATININE 8.3*   CALCIUM 9.4   MG 1.9     Lab Results "   Component Value Date    CALCIUM 9.4 12/01/2017    PHOS 6.3 (H) 12/01/2017               Medications:  Medication list was reviewed and changes noted under Assessment/Plan.    Diagnostic Results:    Impression:   1. No evidence of pulmonary thromboembolism.    2. Multiple patchy and consolidative opacities throughout the right lung, with the largest and most confluent component in the right upper lobe. Findings concerning for underlying infectious process such as pneumonia and/or aspiration. There a few patchy opacities in the lingula. Consider repeat evaluation with noncontrast chest CT in 3 months.    3. Calcified right pleural plaques, likely reflecting sequela of prior asbestos exposure.    4. Mediastinal and right hilar adenopathy, likely reactive in etiology.    5. Atherosclerosis of the aorta and coronary vessels as well as calcification of the aortic valve.      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  Labs noted and dialysate adjusted.  UF goal 2.5L.  - HD MWF while inpatient.  - I have notified primary nephrologist Dr. Watt of pt's hospitalization.  - She dialyzes at [facility].  EDW reportedly 93kg but will try to remove 1 L to help with Pna.     HTN (I12.0)  - Amlodipine with hold parameters.  - Unclear why she is not on ACEi or ARB with history of CAD and DM.     Secondary HPTH and Hyperphosphatemia (N25.81, E83.39)  - Sensipar qd.  - Lanthanum tid with meals.     Anemia of CKD (D63.1)  - hold IV iron in setting of infection.  -Epo with HD.  -Transfuse if symptomatic or <7.5     Hypoxia  - Due to HCAP   - CTA negative for acute PE.    HCAP  - switched abx from Ceftriaxone/Azithro to Vanc/Zosyn to cover for HCAP.  - D/W Dr. Merida.  -covering for Flu also.    Laurent Wild MD  Nephrology

## 2017-12-01 NOTE — PROGRESS NOTES
[facility] Hospital Medicine  Progress Note    Patient Name: Jenni Todd  MRN: [id number]  Patient Class: OP- Observation   Admission Date: 11/29/2017  Length of Stay: 0 days  Attending Physician: Derek Merida MD  Primary Care Provider: Michael Tan Iii, MD        Subjective:     Principal Problem:Pneumonia of right upper lobe due to infectious organism    HPI:  Ms. Jenni Todd is a 47 y.o. female, with PMH of CAD, NIDDM-II, ESRD on dialysis (M/WF, Dr. Munroe; with recent Tevin catheter placement 2 weeks ago), anemia of chronic disease, and HTN, who presented to [facility] ED on 11/29/17 2/2 fever x 2 days. TMAX of 102.2 F, when taken at dialysis the past two sessions temp has been 101.0 F PO. Associated symptoms include chills, congestion, rhinorrhea, sore throat, cough, and eye discharge which all began 5 days ago. She denies chest pain, shortness of breath, nausea, vomiting, diarrhea, dysuria, myalgias, rash, headache, or confusion.      The patient was evaluated in the ED, and diagnosed with RUL PNA, started on ABX, and admitted for further management.     Hospital Course:  No notes on file    Interval History:   States feeling and breathing a little better.      Review of Systems   Constitutional: Positive for fatigue and fever. Negative for diaphoresis.   Eyes: Negative for visual disturbance.   Respiratory: Positive for cough and shortness of breath.    Cardiovascular: Negative for chest pain and palpitations.   Gastrointestinal: Negative for abdominal pain and nausea.   Genitourinary: Negative for difficulty urinating and dysuria.   Musculoskeletal: Negative for arthralgias and myalgias.   Skin: Negative for color change and rash.   Neurological: Negative for dizziness and numbness.   Psychiatric/Behavioral: Negative for agitation and confusion.     Objective:     Vital Signs (Most Recent):  Temp: 100.2 °F (37.9 °C) (12/01/17 0900)  Pulse: 89 (12/01/17 1130)  Resp: 18  (12/01/17 1130)  BP: 119/63 (12/01/17 1130)  SpO2: (!) 92 % (12/01/17 0755) Vital Signs (24h Range):  Temp:  [99 °F (37.2 °C)-103.2 °F (39.6 °C)] 100.2 °F (37.9 °C)  Pulse:  [] 89  Resp:  [16-20] 18  SpO2:  [87 %-97 %] 92 %  BP: (114-186)/(58-88) 119/63     Weight: 93 kg (205 lb)  Body mass index is 32.11 kg/m².    Intake/Output Summary (Last 24 hours) at 12/01/17 1217  Last data filed at 12/01/17 0626   Gross per 24 hour   Intake              220 ml   Output                0 ml   Net              220 ml      Physical Exam   Constitutional: She is oriented to person, place, and time. She appears well-developed and well-nourished.   HENT:   Head: Normocephalic and atraumatic.   Eyes: Conjunctivae are normal. Pupils are equal, round, and reactive to light.   Neck: Normal range of motion. Neck supple.   Cardiovascular: Regular rhythm.    Murmur heard.  2/6 R2ICS   Pulmonary/Chest: Effort normal and breath sounds normal.   Abdominal: Soft. Bowel sounds are normal. There is no tenderness.   Musculoskeletal: She exhibits no edema or tenderness.   Neurological: She is alert and oriented to person, place, and time.   Skin: Skin is warm and dry.       Significant Labs:   BMP:   Recent Labs  Lab 12/01/17 0427   *      K 4.5   CL 96   CO2 25   BUN 28*   CREATININE 8.3*   CALCIUM 9.4   MG 1.9     CBC:   Recent Labs  Lab 11/29/17 2021 11/30/17  0429 12/01/17 0427   WBC 7.41 6.15 7.48   HGB 8.7* 7.9* 8.1*   HCT 26.3* 24.2* 24.9*    242 231       Significant Imaging: I have reviewed all pertinent imaging results/findings within the past 24 hours.   Imaging Results          CTA Chest Non-Coronary (Final result)  Result time 11/30/17 22:30:35    Final result by Regina Sahu MD (11/30/17 22:30:35)                 Impression:      1.  No evidence of pulmonary thromboembolism.    2.  Multiple patchy and consolidative opacities throughout the right lung, with the largest and most confluent component in  the right upper lobe.  Findings concerning for underlying infectious process such as pneumonia and/or aspiration.  There a few patchy opacities in the lingula.  Consider repeat evaluation with noncontrast chest CT in 3 months.    3.  Calcified right pleural plaques, likely reflecting sequela of prior asbestos exposure.    4.  Mediastinal and right hilar adenopathy, likely reactive in etiology.    5.  Atherosclerosis of the aorta and coronary vessels as well as calcification of the aortic valve.            Electronically signed by: JENNIFER NATHAN  Date:     11/30/17  Time:    22:30              Narrative:    TECHNIQUE: The chest was surveyed from the costophrenic angles through the lung apices at 3-mm increments after the administration of 75 cc of Omnipaque 350 intravenous contrast material according to the PE protocol.  Axial, sagittal and coronal maximum intensity projection images were reviewed.    COMPARISON:Chest radiograph 11/29/2017.    FINDINGS:  There is a left-sided central venous catheter in place with tip terminating in the right atrium.  Remaining visualized vascular and soft tissue structures at the base of the neck are within normal limits.  The heart is not enlarged.  There is no significant pericardial fluid.  There is calcific atherosclerosis of the coronary vessels.  The thoracic aorta is non-aneurysmal with moderate atherosclerosis.  There is calcification of the aortic valve.    There normal size axillary lymph nodes present.  There are prominent and enlarged mediastinal lymph nodes present including a precarinal lymph node measuring approximately 1.7 cm in short axis diameter.  There is also a conglomerate of subcarinal adenopathy and mild right hilar adenopathy.    The pulmonary arteries distributed normally.  There is no filling defects to indicate pulmonary thromboembolism.    The trachea and proximal airways are patent.  There are multiple patchy and consolidative opacities throughout the  right upper, middle and lower lobes, with the most prominent component in the right upper lobe.  Overall, findings are concerning for underlying infectious process such as pneumonia or aspiration.  Clinical correlation recommended.  Consider reevaluation with noncontrast chest CT in 3 months.  The left lung demonstrates a few patchy nodular opacities in the lingula.  There is calcified right pleural plaque.  No pleural fluid or pneumothorax.    Incidentally visualized structures of the abdomen demonstrate significant atherosclerosis of the visceral vascular structures.  Visualized osseous structures demonstrate mild degenerative change without acute abnormality.                             X-Ray Chest PA And Lateral (Final result)  Result time 11/29/17 21:16:01    Final result by Adan Alves MD (11/29/17 21:16:01)                 Impression:        Right upper lobe airspace opacity concerning for pneumonia.      Electronically signed by: ADAN ALVES MD  Date:     11/29/17  Time:    21:16              Narrative:    Technique:  Chest PA and lateral radiographs    Comparison: 2/22/17    Findings:     Left IJ approach dialysis catheter terminates over the right atrium. There is a right upper lobe airspace opacity. Mild central vascular congestion. The mediastinal structures are midline. The cardiac silhouette is normal in size. The osseous structures demonstrate no acute abnormality.                            Assessment/Plan:      * Pneumonia of right upper lobe due to infectious organism    - Vanc, Zosyn, Zithromax day 2  - Influenza swab neg, but will give Tamiflu renally dosed.  - Follow blood culture.          Acute respiratory failure with hypoxia and hypercarbia    - No respiratory distress on exam.  - No h/o tobacco or oxygen use at home.  - SaO2 low 80s on room air.  - Lungs without wheeze, generally clear.  - CTA Chest no evidence for PE.  - By history has only reported a cold for 5 days with fever  for last 2 days.   - Pulmonology follwing.                    H/O: CVA (cerebrovascular accident)              Type 2 diabetes mellitus with diabetic chronic kidney disease    - States doesn't take Starlix  - diabetic diet  - accuchecks  - SSRI   - last A1C: 9.0 on 2/22/17    - A1C = 7.9          CAD (coronary artery disease) with recent PCI 12/18/2012    - continue ASA   - continue plavix (patient has been taking since stenting in 2012 2/2 2 stents s/p MI)        Hyperlipidemia    - continue atorvastatin 40 mg QD  - last lipid panel:    2/24/2017 05:46   Cholesterol 199   HDL 19 (L)   LDL Cholesterol Invalid, Trig>400.0   Total Cholesterol/HDL Ratio 10.5 (H)   Triglycerides 460 (H)             Hypertension    - continue home meds    - amlodipine 10 mg QD   - BP moderately elevated upon admission   - hydralazine PRN          Anemia associated with chronic renal failure    - H&H mildly decreased from apparent baseline   - no active bleeding at present  - monitor           ESRD (end stage renal disease) on PD ( initiated dialysis 04/20/2004)    - Nephrology following   - normal dialysis M/W/F            VTE Risk Mitigation         Ordered     heparin (porcine) injection 5,000 Units  Every 8 hours     Route:  Subcutaneous        11/30/17 1653     Medium Risk of VTE  Once      11/30/17 0347     Reason for No Pharmacological VTE Prophylaxis  Once      11/30/17 0347     Place MEERA hose  Until discontinued      11/29/17 2359     Place sequential compression device  Until discontinued      11/29/17 8759              Derek Merida MD  Department of Hospital Medicine   [facility]

## 2017-12-01 NOTE — NURSING
20g placed in left forearm by marge Colbert in [place] notified and stated they would send for pt now.  Will continue to monitor.

## 2017-12-01 NOTE — CONSULTS
Admit Date: 11/29/2017   LOS: 0 days     SUBJECTIVE:   HX REviewed- 3 days cough- works convention business-focal segmental sclerosis-esrd- subsequent dm/hbp-non smoker- no previous pneumonia-no known sick exposures- abx management discussed dr shelton  Continuous Infusions:     Scheduled Meds:   sodium chloride 0.9%   Intravenous Once    albuterol sulfate  1.25 mg Nebulization Q6H    amLODIPine  10 mg Oral Daily    aspirin  81 mg Oral Daily    atorvastatin  40 mg Oral QHS    calcitRIOL  0.5 mcg Oral BID    cilostazol  50 mg Oral BID    cinacalcet  90 mg Oral Daily    clopidogrel  75 mg Oral Daily    epogen adonay (PROCRIT) injection 30,000 units kit  30,000 Units Intravenous Q14 Days    guaifenesin 100 mg/5 ml  200 mg Oral Q6H    heparin (porcine)  5,000 Units Subcutaneous Q8H    lanthanum  1,000 mg Oral TID WM    oseltamivir  30 mg Oral Every other day    piperacillin-tazobactam 2.25 g in dextrose 5 % 50 mL IVPB (ready to mix system)  2.25 g Intravenous Q12H    vitamin renal formula (B-complex-vitamin c-folic acid)  1 capsule Oral Daily       Review of Systems:  see hpi     OBJECTIVE:     Vital Signs Range (Last 24H):  Temp:  [99 °F (37.2 °C)-103.2 °F (39.6 °C)]   Pulse:  []   Resp:  [16-20]   BP: (114-186)/(63-88)   SpO2:  [87 %-97 %]     I & O (Last 24H):  Intake/Output Summary (Last 24 hours) at 12/01/17 1048  Last data filed at 12/01/17 0626   Gross per 24 hour   Intake              220 ml   Output                0 ml   Net              220 ml       Physical Exam:  General: no distress, well nourished, moderately obese  Neck: no jugular venous distention  Lungs:  egophony posterior - right  Chest Wall: no tenderness  Heart: regular rate and rhythm  Abdomen: soft, non-tender non-distended; bowel sounds normal    Laboratory:  CBC:   Recent Labs  Lab 12/01/17  0427   WBC 7.48   RBC 2.52*   HGB 8.1*   HCT 24.9*      MCV 99*   MCH 32.1*   MCHC 32.5     CMP:   Recent Labs  Lab  17  0427   *  < > 153*   CALCIUM 10.8*  < > 9.4   ALBUMIN 3.3*  --   --    PROT 8.1  --   --      < > 136   K 3.8  < > 4.5   CO2 28  < > 25   CL 95  < > 96   BUN 15  < > 28*   CREATININE 4.7*  < > 8.3*   ALKPHOS 124  --   --    ALT 14  --   --    AST 23  --   --    BILITOT 0.4  --   --    < > = values in this interval not displayed.  Coagulation: No results for input(s): LABPROT, INR, APTT in the last 168 hours.  Cardiac markers: No results for input(s): CKMB, CPKMB, TROPONINT, TROPONINI, MYOGLOBIN in the last 168 hours.  ABGs: No results for input(s): PH, PCO2, PO2, HCO3, POCSATURATED, BE in the last 168 hours.  Microbiology Results (last 7 days)     Procedure Component Value Units Date/Time    Strep A culture, throat [[id number]] Collected:  17    Order Status:  Completed Specimen:  Throat Updated:  17 0722     Strep A Culture No significant growth    Blood Culture #2 **CANNOT BE ORDERED STAT** [[id number]] Collected:  17    Order Status:  Completed Specimen:  Blood from Line, Arterial, Right Updated:  17 0613     Blood Culture, Routine No Growth to date     Blood Culture, Routine No Growth to date    Blood Culture #1 **CANNOT BE ORDERED STAT** [[id number]] Collected:  17    Order Status:  Completed Specimen:  Blood from Line, Arterial, Right Updated:  17 0612     Blood Culture, Routine No Growth to date     Blood Culture, Routine No Growth to date    Rapid strep screen [[id number]] Collected:  17    Order Status:  Completed Specimen:  Throat from Throat Updated:  17     Rapid Strep A Screen Negative     Comment: See Micro for reflexed Strep culture.           Past Surgical History:   Procedure Laterality Date    CARDIAC CATHETERIZATION      PCI x 2     SECTION, CLASSIC      DIALYSIS FISTULA CREATION      multiple fistulas and grafts before PD     INCISION AND DRAINAGE OF WOUND Left 2016    VULVAR  ABCESS WITH NECROSIS    PERITONEAL CATHETER INSERTION      UMBILICAL HERNIA REPAIR       Family History   Problem Relation Age of Onset    Cancer Maternal Grandmother     Cancer Paternal Grandfather     Diabetes Maternal Aunt     Diabetes Paternal Aunt     Kidney disease Neg Hx     Heart disease Neg Hx     Ovarian cancer Neg Hx     Breast cancer Neg Hx      Past Medical History:   Diagnosis Date    Abnormal finding on Pap smear, HPV DNA positive 2014    Anemia associated with chronic renal failure     on Epogen    Blood type B+     Bulging discs - symptomatic      CAD (coronary artery disease)     Diabetes mellitus, type 2     ESRD (end stage renal disease) 04/20/2004    FSGS (focal segmental glomerulosclerosis)     with collapsing    Hyperlipidemia     Hypertension 2004    Neuropathy     Obesity     Secondary hyperparathyroidism, renal     TIA (transient ischemic attack)     Uterine fibroid     small uterine      Diagnostic Results:  CT: Reviewed  X-Ray: Reviewed  Social History     Social History    Marital status: Single     Spouse name: N/A    Number of children: N/A    Years of education: N/A     Occupational History     [employer]     Social History Main Topics    Smoking status: Never Smoker    Smokeless tobacco: Never Used    Alcohol use No      Comment: Pt reports some social use of about 1-2 drinks about every six months.    Drug use: No    Sexual activity: Not Currently     Partners: Male     Birth control/ protection: None     Other Topics Concern    Not on file     Social History Narrative     [place]    Single    One child    History of blood transfusion in 2004    Potential donor ??? brother     ASSESSMENT/PLAN:     Active Hospital Problems    Diagnosis  POA    *Pneumonia of right upper lobe due to infectious organism [J18.1]  Yes    Acute respiratory failure with hypoxia and hypercarbia [J96.01, J96.02]  Yes    H/O: CVA (cerebrovascular  accident) [Z86.73]  Not Applicable    Type 2 diabetes mellitus with diabetic chronic kidney disease [E11.22]  Yes    Anemia associated with chronic renal failure [D63.1]  Yes     on Epogen      Hypertension [I10]  Yes    Hyperlipidemia [E78.5]  Yes    CAD (coronary artery disease) with recent PCI 12/18/2012 [I25.10]  Yes     Cath 12/27/2012:   RCA PCI 2.5 x 18 and 3.0 x 26 mm BMS   Patent LAD and LCX   Normal EF     Dr. Berrios for NSTEMI       DAPT score 3         ESRD (end stage renal disease) on PD ( initiated dialysis 04/20/2004) [N18.6]  Yes     Nightly PD        Resolved Hospital Problems    Diagnosis Date Resolved POA   No resolved problems to display.     Extensive pneumonia- agree with covering for flu-  Case Discussed With:dr shelton

## 2017-12-01 NOTE — NURSING
Notified Julieta in xray that pt is now back in room from dialysis and they can send for pt now.  Will continue to monitor.

## 2017-12-01 NOTE — NURSING
Pt now sating 94-95% on 5L NC.  Venti mask placed at bedside.  Will add humidification to oxygen.  Dr. Merida returned the page, no new orders noted.  Will continue to monitor.

## 2017-12-01 NOTE — NURSING
Dr. Odonnell states we may place an IV in the left arm, as the left AVF is nonfunctioning.  House supervisor at bedside attempting to place IV in left arm, as the right arm was unsuccessful.  Will continue to monitor.

## 2017-12-02 LAB
ANION GAP SERPL CALC-SCNC: 15 MMOL/L
BACTERIA THROAT CULT: NORMAL
BUN SERPL-MCNC: 21 MG/DL
CALCIUM SERPL-MCNC: 9 MG/DL
CHLORIDE SERPL-SCNC: 100 MMOL/L
CO2 SERPL-SCNC: 22 MMOL/L
CREAT SERPL-MCNC: 6.3 MG/DL
EST. GFR  (AFRICAN AMERICAN): 8 ML/MIN/1.73 M^2
EST. GFR  (NON AFRICAN AMERICAN): 7 ML/MIN/1.73 M^2
GLUCOSE SERPL-MCNC: 140 MG/DL
MAGNESIUM SERPL-MCNC: 1.8 MG/DL
PHOSPHATE SERPL-MCNC: 4.8 MG/DL
POCT GLUCOSE: 145 MG/DL (ref 70–110)
POCT GLUCOSE: 161 MG/DL (ref 70–110)
POCT GLUCOSE: 166 MG/DL (ref 70–110)
POCT GLUCOSE: 189 MG/DL (ref 70–110)
POTASSIUM SERPL-SCNC: 4.5 MMOL/L
SODIUM SERPL-SCNC: 137 MMOL/L
VANCOMYCIN SERPL-MCNC: 24.6 UG/ML

## 2017-12-02 PROCEDURE — 80202 ASSAY OF VANCOMYCIN: CPT | Mod: NTX

## 2017-12-02 PROCEDURE — 36415 COLL VENOUS BLD VENIPUNCTURE: CPT | Mod: NTX

## 2017-12-02 PROCEDURE — 63600175 PHARM REV CODE 636 W HCPCS: Mod: NTX | Performed by: HOSPITALIST

## 2017-12-02 PROCEDURE — 94640 AIRWAY INHALATION TREATMENT: CPT | Mod: NTX

## 2017-12-02 PROCEDURE — 99232 SBSQ HOSP IP/OBS MODERATE 35: CPT | Mod: NTX,,, | Performed by: HOSPITALIST

## 2017-12-02 PROCEDURE — 63600175 PHARM REV CODE 636 W HCPCS: Mod: NTX | Performed by: INTERNAL MEDICINE

## 2017-12-02 PROCEDURE — 83735 ASSAY OF MAGNESIUM: CPT | Mod: NTX

## 2017-12-02 PROCEDURE — 94761 N-INVAS EAR/PLS OXIMETRY MLT: CPT | Mod: NTX

## 2017-12-02 PROCEDURE — 84100 ASSAY OF PHOSPHORUS: CPT | Mod: NTX

## 2017-12-02 PROCEDURE — 11000001 HC ACUTE MED/SURG PRIVATE ROOM: Mod: NTX

## 2017-12-02 PROCEDURE — 25000003 PHARM REV CODE 250: Mod: NTX | Performed by: PHYSICIAN ASSISTANT

## 2017-12-02 PROCEDURE — 25000242 PHARM REV CODE 250 ALT 637 W/ HCPCS: Mod: NTX | Performed by: PHYSICIAN ASSISTANT

## 2017-12-02 PROCEDURE — 27000221 HC OXYGEN, UP TO 24 HOURS: Mod: NTX

## 2017-12-02 PROCEDURE — 25000003 PHARM REV CODE 250: Mod: NTX | Performed by: HOSPITALIST

## 2017-12-02 PROCEDURE — 80048 BASIC METABOLIC PNL TOTAL CA: CPT | Mod: NTX

## 2017-12-02 RX ADMIN — CALCITRIOL 0.5 MCG: 0.25 CAPSULE, LIQUID FILLED ORAL at 08:12

## 2017-12-02 RX ADMIN — CLOPIDOGREL 75 MG: 75 TABLET, FILM COATED ORAL at 08:12

## 2017-12-02 RX ADMIN — ERYTHROPOIETIN 30000 UNITS: 10000 INJECTION, SOLUTION INTRAVENOUS; SUBCUTANEOUS at 01:12

## 2017-12-02 RX ADMIN — ASPIRIN 81 MG: 81 TABLET, COATED ORAL at 08:12

## 2017-12-02 RX ADMIN — CINACALCET HYDROCHLORIDE 90 MG: 30 TABLET, COATED ORAL at 08:12

## 2017-12-02 RX ADMIN — HEPARIN SODIUM 5000 UNITS: 5000 INJECTION, SOLUTION INTRAVENOUS; SUBCUTANEOUS at 01:12

## 2017-12-02 RX ADMIN — HEPARIN SODIUM 5000 UNITS: 5000 INJECTION, SOLUTION INTRAVENOUS; SUBCUTANEOUS at 05:12

## 2017-12-02 RX ADMIN — LANTHANUM CARBONATE 1000 MG: 500 TABLET, CHEWABLE ORAL at 05:12

## 2017-12-02 RX ADMIN — GUAIFENESIN 200 MG: 100 SOLUTION ORAL at 11:12

## 2017-12-02 RX ADMIN — HEPARIN SODIUM 5000 UNITS: 5000 INJECTION, SOLUTION INTRAVENOUS; SUBCUTANEOUS at 09:12

## 2017-12-02 RX ADMIN — PIPERACILLIN SODIUM AND TAZOBACTAM SODIUM 2.25 G: 2; .25 INJECTION, POWDER, FOR SOLUTION INTRAVENOUS at 05:12

## 2017-12-02 RX ADMIN — IPRATROPIUM BROMIDE AND ALBUTEROL SULFATE 3 ML: .5; 3 SOLUTION RESPIRATORY (INHALATION) at 10:12

## 2017-12-02 RX ADMIN — AMLODIPINE BESYLATE 10 MG: 5 TABLET ORAL at 08:12

## 2017-12-02 RX ADMIN — GUAIFENESIN 200 MG: 100 SOLUTION ORAL at 05:12

## 2017-12-02 RX ADMIN — CALCITRIOL 0.5 MCG: 0.25 CAPSULE, LIQUID FILLED ORAL at 09:12

## 2017-12-02 RX ADMIN — LANTHANUM CARBONATE 1000 MG: 500 TABLET, CHEWABLE ORAL at 08:12

## 2017-12-02 RX ADMIN — IPRATROPIUM BROMIDE AND ALBUTEROL SULFATE 3 ML: .5; 3 SOLUTION RESPIRATORY (INHALATION) at 03:12

## 2017-12-02 RX ADMIN — Medication 1 CAPSULE: at 08:12

## 2017-12-02 RX ADMIN — ATORVASTATIN CALCIUM 40 MG: 20 TABLET, FILM COATED ORAL at 09:12

## 2017-12-02 NOTE — PLAN OF CARE
Problem: Patient Care Overview  Goal: Plan of Care Review  Outcome: Ongoing (interventions implemented as appropriate)  Pt remains on 3LNC. Tx given and tolerated well.

## 2017-12-02 NOTE — NURSING
"Pt aggravated with telemetry box stating "it's the most uncomfortable thing in the world" and would like to have it removed.  Pt has been normal sinus for >24hrs.  MAICO Pedraza notified and stated it was okay to d/c telemetry.  Pt very relieved.  Will continue to monitor.  "

## 2017-12-02 NOTE — PLAN OF CARE
Problem: Patient Care Overview  Goal: Plan of Care Review  Outcome: Ongoing (interventions implemented as appropriate)  Pt free of fall or injury during shift.  Denies pain.  VSS on 3L NC now.  Denies SOB.  Positions self independently in bed. Poor appetite today; has slept most of shift.  Sinus tachycardia/normal sinus on telemetry.  Fever again only moderately controlled by prn tylenol.  Purposeful rounding performed and safety maintained.  Pt resting comfortably in bed, no other complaints at this time.   Will continue to monitor.

## 2017-12-02 NOTE — PROGRESS NOTES
"Nephrology  Progress Note    Admit Date: 11/29/2017   LOS: 1 day     SUBJECTIVE:     Follow-up For:  Pneumonia of right upper lobe due to infectious organism/ESRD    Interval History:     Less SOB.  Breathing improved after neb treatments.  Family at the bedside.     Review of Systems:  Constitutional: No fever or chills  Respiratory:  shortness of breath  Cardiovascular: No chest pain or palpitations  Gastrointestinal: No nausea or vomiting  Neurological: No confusion or weakness    OBJECTIVE:     Vital Signs Range (Last 24H):  BP (!) 146/82 (BP Location: Right arm, Patient Position: Lying)   Pulse 93   Temp 99.6 °F (37.6 °C) (Oral)   Resp 18   Ht 5' 7" (1.702 m)   Wt 93 kg (205 lb)   LMP 01/28/2014 (Approximate)   SpO2 (!) 93%   Breastfeeding? No   BMI 32.11 kg/m²     Temp:  [99.1 °F (37.3 °C)-101.4 °F (38.6 °C)]   Pulse:  []   Resp:  [16-18]   BP: (125-163)/(72-82)   SpO2:  [93 %-97 %]     I & O (Last 24H):    Intake/Output Summary (Last 24 hours) at 12/02/17 1435  Last data filed at 12/02/17 0900   Gross per 24 hour   Intake              290 ml   Output                0 ml   Net              290 ml       Physical Exam:  General appearance: Well developed, well nourished  Eyes:  Conjunctivae/corneas clear. PERRL.  Lungs: Normal respiratory effort, rales at R base.  Heart: Regular rate and rhythm, S1, S2 normal, no murmur, rub or beau.  Abdomen: Soft ,obese bowel sounds normal; no masses,  no organomegaly, obese  Extremities: No cyanosis or clubbing. No edema.    Skin: Skin color, texture, turgor normal. No rashes or lesions  Neurologic: Normal strength and tone. No focal numbness or weakness   Left IJ ERNIE    Laboratory Data:  No results for input(s): WBC, RBC, HGB, HCT, PLT, MCV, MCH, MCHC in the last 24 hours.    BMP:     Recent Labs  Lab 12/02/17  0429   *      K 4.5      CO2 22*   BUN 21*   CREATININE 6.3*   CALCIUM 9.0   MG 1.8     Lab Results   Component Value Date    " CALCIUM 9.0 12/02/2017    PHOS 4.8 (H) 12/02/2017       Medications:  Medication list was reviewed and changes noted under Assessment/Plan.    Diagnostic Results:    Impression:   1. No evidence of pulmonary thromboembolism.    2. Multiple patchy and consolidative opacities throughout the right lung, with the largest and most confluent component in the right upper lobe. Findings concerning for underlying infectious process such as pneumonia and/or aspiration. There a few patchy opacities in the lingula. Consider repeat evaluation with noncontrast chest CT in 3 months.    3. Calcified right pleural plaques, likely reflecting sequela of prior asbestos exposure.    4. Mediastinal and right hilar adenopathy, likely reactive in etiology.    5. Atherosclerosis of the aorta and coronary vessels as well as calcification of the aortic valve.      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  - HD MWF while inpatient.  - I have notified primary nephrologist Dr. Watt of pt's hospitalization.  - She dialyzes at [facility].  EDW reportedly 93kg.     HTN (I12.0)  - Amlodipine with hold parameters.  - Unclear why she is not on ACEi or ARB with history of CAD and DM.     Secondary HPTH and Hyperphosphatemia (N25.81, E83.39)  - Sensipar qd.  - Lanthanum tid with meals.     Anemia of CKD (D63.1)  - hold IV iron in setting of infection.  -Epo with HD.  -Transfuse if symptomatic or <7.5     Hypoxia  - Due to HCAP   - CTA negative for acute PE.  - Will order incentive spirometry to the bedside.    HCAP  - switched abx from Ceftriaxone/Azithro to Vanc/Zosyn to cover for HCAP.  -covering for Flu also.    Laurent Wild MD  Nephrology

## 2017-12-02 NOTE — PLAN OF CARE
Problem: Patient Care Overview  Goal: Plan of Care Review  Outcome: Ongoing (interventions implemented as appropriate)  Pt on 3L NC. Sats 96%. No distress noted. PRN aerosol tx tolerated well. Will continue to monitor.

## 2017-12-02 NOTE — PROGRESS NOTES
No new resp complaints - SpO2 is 95% on 3L NC    Tm 99.6    146/82    HR 93  NAD  Obese  Awake, alert  MM moist  Heart reg  Lungs diminished   Abd NT   Ext no pitting edema    WBC 7K    A: acute resp failure with hypoxia      Pneumonia      ESRD   P: on vanc and pip/tazo        Stable O2 sats on 3L NC

## 2017-12-02 NOTE — SIGNIFICANT EVENT
"Received call from RN. Patient requesting to have cardiac monitor removed as "it is really annoying her." Reviewed patient's chart. No significant events on tele x 24 hours. Rate is normal sinus. No h/o conduction abnormalities/irregular heart rhythms. D/c'd tele order.   "

## 2017-12-02 NOTE — SUBJECTIVE & OBJECTIVE
Interval History:   Improving.       Review of Systems   Constitutional: Positive for fatigue and fever. Negative for diaphoresis.   Eyes: Negative for visual disturbance.   Respiratory: Positive for cough and shortness of breath.    Cardiovascular: Negative for chest pain and palpitations.   Gastrointestinal: Negative for abdominal pain and nausea.   Genitourinary: Negative for difficulty urinating and dysuria.   Musculoskeletal: Negative for arthralgias and myalgias.   Skin: Negative for color change and rash.   Neurological: Negative for dizziness and numbness.   Psychiatric/Behavioral: Negative for agitation and confusion.     Objective:     Vital Signs (Most Recent):  Temp: 99.6 °F (37.6 °C) (12/02/17 1139)  Pulse: 93 (12/02/17 1139)  Resp: 18 (12/02/17 1139)  BP: (!) 146/82 (12/02/17 1139)  SpO2: (!) 93 % (12/02/17 1139) Vital Signs (24h Range):  Temp:  [99.1 °F (37.3 °C)-101.4 °F (38.6 °C)] 99.6 °F (37.6 °C)  Pulse:  [] 93  Resp:  [16-18] 18  SpO2:  [93 %-97 %] 93 %  BP: (125-163)/(59-82) 146/82     Weight: 93 kg (205 lb)  Body mass index is 32.11 kg/m².    Intake/Output Summary (Last 24 hours) at 12/02/17 1358  Last data filed at 12/02/17 0900   Gross per 24 hour   Intake              290 ml   Output                0 ml   Net              290 ml      Physical Exam   Constitutional: She is oriented to person, place, and time. She appears well-developed and well-nourished.   HENT:   Head: Normocephalic and atraumatic.   Eyes: Conjunctivae are normal. Pupils are equal, round, and reactive to light.   Neck: Normal range of motion. Neck supple.   Cardiovascular: Regular rhythm.    Murmur heard.  2/6 R2ICS   Pulmonary/Chest: Effort normal and breath sounds normal.   Abdominal: Soft. Bowel sounds are normal. There is no tenderness.   Musculoskeletal: She exhibits no edema or tenderness.   Neurological: She is alert and oriented to person, place, and time.   Skin: Skin is warm and dry.       Significant Labs:    BMP:   Recent Labs  Lab 12/02/17 0429   *      K 4.5      CO2 22*   BUN 21*   CREATININE 6.3*   CALCIUM 9.0   MG 1.8     CBC:   Recent Labs  Lab 12/01/17 0427   WBC 7.48   HGB 8.1*   HCT 24.9*          Significant Imaging: I have reviewed all pertinent imaging results/findings within the past 24 hours.   Imaging Results          X-Ray Chest PA And Lateral (Final result)  Result time 12/01/17 16:21:13    Final result by Franci Bragg MD (12/01/17 16:21:13)                 Impression:     As above.          Electronically signed by: FRANCI BRAGG MD  Date:     12/01/17  Time:    16:21              Narrative:    Chest PA and lateral    Indication:Pneumonia    Comparison:CT 11/30/2017, radiograph 11/29/2017    Findings:  The cardiomediastinal silhouette is stable.  Left central venous catheter tip projects over the distal SVC/Anne junction, stable in position.  There is no pleural effusion.  The trachea is midline.  The lungs are symmetrically expanded bilaterally tachycardia increased interstitial and parenchymal attenuation, worse on the right, developing infection is a consideration versus worsening edema.   There is no pneumothorax.  The osseous structures are unchanged.                             CTA Chest Non-Coronary (Final result)  Result time 11/30/17 22:30:35    Final result by Regina Sahu MD (11/30/17 22:30:35)                 Impression:      1.  No evidence of pulmonary thromboembolism.    2.  Multiple patchy and consolidative opacities throughout the right lung, with the largest and most confluent component in the right upper lobe.  Findings concerning for underlying infectious process such as pneumonia and/or aspiration.  There a few patchy opacities in the lingula.  Consider repeat evaluation with noncontrast chest CT in 3 months.    3.  Calcified right pleural plaques, likely reflecting sequela of prior asbestos exposure.    4.  Mediastinal and right hilar  adenopathy, likely reactive in etiology.    5.  Atherosclerosis of the aorta and coronary vessels as well as calcification of the aortic valve.            Electronically signed by: JENNIFER NATHAN  Date:     11/30/17  Time:    22:30              Narrative:    TECHNIQUE: The chest was surveyed from the costophrenic angles through the lung apices at 3-mm increments after the administration of 75 cc of Omnipaque 350 intravenous contrast material according to the PE protocol.  Axial, sagittal and coronal maximum intensity projection images were reviewed.    COMPARISON:Chest radiograph 11/29/2017.    FINDINGS:  There is a left-sided central venous catheter in place with tip terminating in the right atrium.  Remaining visualized vascular and soft tissue structures at the base of the neck are within normal limits.  The heart is not enlarged.  There is no significant pericardial fluid.  There is calcific atherosclerosis of the coronary vessels.  The thoracic aorta is non-aneurysmal with moderate atherosclerosis.  There is calcification of the aortic valve.    There normal size axillary lymph nodes present.  There are prominent and enlarged mediastinal lymph nodes present including a precarinal lymph node measuring approximately 1.7 cm in short axis diameter.  There is also a conglomerate of subcarinal adenopathy and mild right hilar adenopathy.    The pulmonary arteries distributed normally.  There is no filling defects to indicate pulmonary thromboembolism.    The trachea and proximal airways are patent.  There are multiple patchy and consolidative opacities throughout the right upper, middle and lower lobes, with the most prominent component in the right upper lobe.  Overall, findings are concerning for underlying infectious process such as pneumonia or aspiration.  Clinical correlation recommended.  Consider reevaluation with noncontrast chest CT in 3 months.  The left lung demonstrates a few patchy nodular opacities in the  lingula.  There is calcified right pleural plaque.  No pleural fluid or pneumothorax.    Incidentally visualized structures of the abdomen demonstrate significant atherosclerosis of the visceral vascular structures.  Visualized osseous structures demonstrate mild degenerative change without acute abnormality.                             X-Ray Chest PA And Lateral (Final result)  Result time 11/29/17 21:16:01    Final result by Adan Alves MD (11/29/17 21:16:01)                 Impression:        Right upper lobe airspace opacity concerning for pneumonia.      Electronically signed by: ADAN ALVES MD  Date:     11/29/17  Time:    21:16              Narrative:    Technique:  Chest PA and lateral radiographs    Comparison: 2/22/17    Findings:     Left IJ approach dialysis catheter terminates over the right atrium. There is a right upper lobe airspace opacity. Mild central vascular congestion. The mediastinal structures are midline. The cardiac silhouette is normal in size. The osseous structures demonstrate no acute abnormality.

## 2017-12-02 NOTE — PLAN OF CARE
Problem: Patient Care Overview  Goal: Plan of Care Review  Outcome: Ongoing (interventions implemented as appropriate)  No injuries. Plan of care reviewed with patient. Verbalized understanding. Vitals stable. Will continue to monitor.

## 2017-12-02 NOTE — ASSESSMENT & PLAN NOTE
- Vanc, Zosyn, Zithromax day 3  - Influenza swab neg, but will give Tamiflu renally dosed day 2.  - Follow blood culture.

## 2017-12-02 NOTE — PLAN OF CARE
Problem: Patient Care Overview  Goal: Plan of Care Review  Outcome: Ongoing (interventions implemented as appropriate)  Pt on NC 3L sat's 92% prn treatment given tolerated well.Will continue to monitor.

## 2017-12-02 NOTE — PROGRESS NOTES
[facility] Hospital Medicine  Progress Note    Patient Name: Jenni Todd  MRN: [id number]  Patient Class: IP- Inpatient   Admission Date: 11/29/2017  Length of Stay: 1 days  Attending Physician: Derek Merida MD  Primary Care Provider: Michael Tan Iii, MD        Subjective:     Principal Problem:Pneumonia of right upper lobe due to infectious organism    HPI:  Ms. Jenni Todd is a 47 y.o. female, with PMH of CAD, NIDDM-II, ESRD on dialysis (M/WF, Dr. Munroe; with recent Tevin catheter placement 2 weeks ago), anemia of chronic disease, and HTN, who presented to [facility] ED on 11/29/17 2/2 fever x 2 days. TMAX of 102.2 F, when taken at dialysis the past two sessions temp has been 101.0 F PO. Associated symptoms include chills, congestion, rhinorrhea, sore throat, cough, and eye discharge which all began 5 days ago. She denies chest pain, shortness of breath, nausea, vomiting, diarrhea, dysuria, myalgias, rash, headache, or confusion.      The patient was evaluated in the ED, and diagnosed with RUL PNA, started on ABX, and admitted for further management.     Hospital Course:  No notes on file    Interval History:   Improving.       Review of Systems   Constitutional: Positive for fatigue and fever. Negative for diaphoresis.   Eyes: Negative for visual disturbance.   Respiratory: Positive for cough and shortness of breath.    Cardiovascular: Negative for chest pain and palpitations.   Gastrointestinal: Negative for abdominal pain and nausea.   Genitourinary: Negative for difficulty urinating and dysuria.   Musculoskeletal: Negative for arthralgias and myalgias.   Skin: Negative for color change and rash.   Neurological: Negative for dizziness and numbness.   Psychiatric/Behavioral: Negative for agitation and confusion.     Objective:     Vital Signs (Most Recent):  Temp: 99.6 °F (37.6 °C) (12/02/17 1139)  Pulse: 93 (12/02/17 1139)  Resp: 18 (12/02/17 1139)  BP: (!) 146/82  (12/02/17 1139)  SpO2: (!) 93 % (12/02/17 1139) Vital Signs (24h Range):  Temp:  [99.1 °F (37.3 °C)-101.4 °F (38.6 °C)] 99.6 °F (37.6 °C)  Pulse:  [] 93  Resp:  [16-18] 18  SpO2:  [93 %-97 %] 93 %  BP: (125-163)/(59-82) 146/82     Weight: 93 kg (205 lb)  Body mass index is 32.11 kg/m².    Intake/Output Summary (Last 24 hours) at 12/02/17 1358  Last data filed at 12/02/17 0900   Gross per 24 hour   Intake              290 ml   Output                0 ml   Net              290 ml      Physical Exam   Constitutional: She is oriented to person, place, and time. She appears well-developed and well-nourished.   HENT:   Head: Normocephalic and atraumatic.   Eyes: Conjunctivae are normal. Pupils are equal, round, and reactive to light.   Neck: Normal range of motion. Neck supple.   Cardiovascular: Regular rhythm.    Murmur heard.  2/6 R2ICS   Pulmonary/Chest: Effort normal and breath sounds normal.   Abdominal: Soft. Bowel sounds are normal. There is no tenderness.   Musculoskeletal: She exhibits no edema or tenderness.   Neurological: She is alert and oriented to person, place, and time.   Skin: Skin is warm and dry.       Significant Labs:   BMP:   Recent Labs  Lab 12/02/17  0429   *      K 4.5      CO2 22*   BUN 21*   CREATININE 6.3*   CALCIUM 9.0   MG 1.8     CBC:   Recent Labs  Lab 12/01/17  0427   WBC 7.48   HGB 8.1*   HCT 24.9*          Significant Imaging: I have reviewed all pertinent imaging results/findings within the past 24 hours.   Imaging Results          X-Ray Chest PA And Lateral (Final result)  Result time 12/01/17 16:21:13    Final result by Franci Bragg MD (12/01/17 16:21:13)                 Impression:     As above.          Electronically signed by: FRANCI BRAGG MD  Date:     12/01/17  Time:    16:21              Narrative:    Chest PA and lateral    Indication:Pneumonia    Comparison:CT 11/30/2017, radiograph 11/29/2017    Findings:  The cardiomediastinal  silhouette is stable.  Left central venous catheter tip projects over the distal SVC/Anne junction, stable in position.  There is no pleural effusion.  The trachea is midline.  The lungs are symmetrically expanded bilaterally tachycardia increased interstitial and parenchymal attenuation, worse on the right, developing infection is a consideration versus worsening edema.   There is no pneumothorax.  The osseous structures are unchanged.                             CTA Chest Non-Coronary (Final result)  Result time 11/30/17 22:30:35    Final result by Regina Sahu MD (11/30/17 22:30:35)                 Impression:      1.  No evidence of pulmonary thromboembolism.    2.  Multiple patchy and consolidative opacities throughout the right lung, with the largest and most confluent component in the right upper lobe.  Findings concerning for underlying infectious process such as pneumonia and/or aspiration.  There a few patchy opacities in the lingula.  Consider repeat evaluation with noncontrast chest CT in 3 months.    3.  Calcified right pleural plaques, likely reflecting sequela of prior asbestos exposure.    4.  Mediastinal and right hilar adenopathy, likely reactive in etiology.    5.  Atherosclerosis of the aorta and coronary vessels as well as calcification of the aortic valve.            Electronically signed by: REGINA SAHU  Date:     11/30/17  Time:    22:30              Narrative:    TECHNIQUE: The chest was surveyed from the costophrenic angles through the lung apices at 3-mm increments after the administration of 75 cc of Omnipaque 350 intravenous contrast material according to the PE protocol.  Axial, sagittal and coronal maximum intensity projection images were reviewed.    COMPARISON:Chest radiograph 11/29/2017.    FINDINGS:  There is a left-sided central venous catheter in place with tip terminating in the right atrium.  Remaining visualized vascular and soft tissue structures at the base of the neck  are within normal limits.  The heart is not enlarged.  There is no significant pericardial fluid.  There is calcific atherosclerosis of the coronary vessels.  The thoracic aorta is non-aneurysmal with moderate atherosclerosis.  There is calcification of the aortic valve.    There normal size axillary lymph nodes present.  There are prominent and enlarged mediastinal lymph nodes present including a precarinal lymph node measuring approximately 1.7 cm in short axis diameter.  There is also a conglomerate of subcarinal adenopathy and mild right hilar adenopathy.    The pulmonary arteries distributed normally.  There is no filling defects to indicate pulmonary thromboembolism.    The trachea and proximal airways are patent.  There are multiple patchy and consolidative opacities throughout the right upper, middle and lower lobes, with the most prominent component in the right upper lobe.  Overall, findings are concerning for underlying infectious process such as pneumonia or aspiration.  Clinical correlation recommended.  Consider reevaluation with noncontrast chest CT in 3 months.  The left lung demonstrates a few patchy nodular opacities in the lingula.  There is calcified right pleural plaque.  No pleural fluid or pneumothorax.    Incidentally visualized structures of the abdomen demonstrate significant atherosclerosis of the visceral vascular structures.  Visualized osseous structures demonstrate mild degenerative change without acute abnormality.                             X-Ray Chest PA And Lateral (Final result)  Result time 11/29/17 21:16:01    Final result by Adan Alves MD (11/29/17 21:16:01)                 Impression:        Right upper lobe airspace opacity concerning for pneumonia.      Electronically signed by: ADAN ALVES MD  Date:     11/29/17  Time:    21:16              Narrative:    Technique:  Chest PA and lateral radiographs    Comparison: 2/22/17    Findings:     Left IJ approach  dialysis catheter terminates over the right atrium. There is a right upper lobe airspace opacity. Mild central vascular congestion. The mediastinal structures are midline. The cardiac silhouette is normal in size. The osseous structures demonstrate no acute abnormality.                            Assessment/Plan:      * Pneumonia of right upper lobe due to infectious organism    - Vanc, Zosyn, Zithromax day 3  - Influenza swab neg, but will give Tamiflu renally dosed day 2.  - Follow blood culture.          Acute respiratory failure with hypoxia and hypercarbia    - No respiratory distress on exam.  - No h/o tobacco or oxygen use at home.  - SaO2 low 80s on room air.  - Lungs without wheeze, generally clear.  - CTA Chest no evidence for PE.  - By history has only reported a cold for 5 days with fever for last 2 days.   - Pulmonology follwing.                    H/O: CVA (cerebrovascular accident)              Type 2 diabetes mellitus with diabetic chronic kidney disease    - States doesn't take Starlix  - diabetic diet  - accuchecks  - SSRI   - last A1C: 9.0 on 2/22/17    - A1C = 7.9          CAD (coronary artery disease) with recent PCI 12/18/2012    - continue ASA   - continue plavix (patient has been taking since stenting in 2012 2/2 2 stents s/p MI)        Hyperlipidemia    - continue atorvastatin 40 mg QD  - last lipid panel:    2/24/2017 05:46   Cholesterol 199   HDL 19 (L)   LDL Cholesterol Invalid, Trig>400.0   Total Cholesterol/HDL Ratio 10.5 (H)   Triglycerides 460 (H)             Hypertension    - continue home meds    - amlodipine 10 mg QD   - BP moderately elevated upon admission   - hydralazine PRN          Anemia associated with chronic renal failure    - H&H mildly decreased from apparent baseline   - no active bleeding at present  - monitor           ESRD (end stage renal disease) on PD ( initiated dialysis 04/20/2004)    - Nephrology following   - normal dialysis M/W/F            VTE Risk  Mitigation         Ordered     heparin (porcine) injection 5,000 Units  Every 8 hours     Route:  Subcutaneous        11/30/17 1653     Medium Risk of VTE  Once      11/30/17 0347     Reason for No Pharmacological VTE Prophylaxis  Once      11/30/17 0347     Place MEERA hose  Until discontinued      11/29/17 2358     Place sequential compression device  Until discontinued      11/29/17 8769              Derek Merida MD  Department of Hospital Medicine   [facility]

## 2017-12-03 VITALS
RESPIRATION RATE: 20 BRPM | HEART RATE: 91 BPM | DIASTOLIC BLOOD PRESSURE: 77 MMHG | TEMPERATURE: 99 F | SYSTOLIC BLOOD PRESSURE: 143 MMHG | WEIGHT: 205 LBS | BODY MASS INDEX: 32.18 KG/M2 | HEIGHT: 67 IN | OXYGEN SATURATION: 97 %

## 2017-12-03 LAB
ANION GAP SERPL CALC-SCNC: 14 MMOL/L
BUN SERPL-MCNC: 30 MG/DL
CALCIUM SERPL-MCNC: 9 MG/DL
CHLORIDE SERPL-SCNC: 101 MMOL/L
CO2 SERPL-SCNC: 23 MMOL/L
CREAT SERPL-MCNC: 8.5 MG/DL
EST. GFR  (AFRICAN AMERICAN): 6 ML/MIN/1.73 M^2
EST. GFR  (NON AFRICAN AMERICAN): 5 ML/MIN/1.73 M^2
GLUCOSE SERPL-MCNC: 155 MG/DL
MAGNESIUM SERPL-MCNC: 2 MG/DL
PHOSPHATE SERPL-MCNC: 5.1 MG/DL
POCT GLUCOSE: 177 MG/DL (ref 70–110)
POCT GLUCOSE: 213 MG/DL (ref 70–110)
POTASSIUM SERPL-SCNC: 4.5 MMOL/L
SODIUM SERPL-SCNC: 138 MMOL/L
VANCOMYCIN SERPL-MCNC: 23 UG/ML

## 2017-12-03 PROCEDURE — 80048 BASIC METABOLIC PNL TOTAL CA: CPT | Mod: NTX

## 2017-12-03 PROCEDURE — 83735 ASSAY OF MAGNESIUM: CPT | Mod: NTX

## 2017-12-03 PROCEDURE — 25000003 PHARM REV CODE 250: Mod: NTX | Performed by: PHYSICIAN ASSISTANT

## 2017-12-03 PROCEDURE — 25000003 PHARM REV CODE 250: Mod: NTX | Performed by: HOSPITALIST

## 2017-12-03 PROCEDURE — 80202 ASSAY OF VANCOMYCIN: CPT | Mod: NTX

## 2017-12-03 PROCEDURE — 63600175 PHARM REV CODE 636 W HCPCS: Mod: NTX | Performed by: HOSPITALIST

## 2017-12-03 PROCEDURE — 99239 HOSP IP/OBS DSCHRG MGMT >30: CPT | Mod: NTX,,, | Performed by: HOSPITALIST

## 2017-12-03 PROCEDURE — 84100 ASSAY OF PHOSPHORUS: CPT | Mod: NTX

## 2017-12-03 PROCEDURE — 36415 COLL VENOUS BLD VENIPUNCTURE: CPT | Mod: NTX

## 2017-12-03 RX ORDER — AMOXICILLIN 250 MG/1
250 CAPSULE ORAL ONCE
Status: COMPLETED | OUTPATIENT
Start: 2017-12-03 | End: 2017-12-03

## 2017-12-03 RX ORDER — AMOXICILLIN 500 MG/1
500 TABLET, FILM COATED ORAL DAILY
Qty: 3 TABLET | Refills: 0 | Status: SHIPPED | OUTPATIENT
Start: 2017-12-03 | End: 2017-12-06

## 2017-12-03 RX ORDER — OSELTAMIVIR PHOSPHATE 30 MG/1
30 CAPSULE ORAL DAILY
Qty: 1 CAPSULE | Refills: 0 | Status: SHIPPED | OUTPATIENT
Start: 2017-12-05 | End: 2017-12-06

## 2017-12-03 RX ADMIN — AMLODIPINE BESYLATE 10 MG: 5 TABLET ORAL at 09:12

## 2017-12-03 RX ADMIN — CINACALCET HYDROCHLORIDE 90 MG: 30 TABLET, COATED ORAL at 01:12

## 2017-12-03 RX ADMIN — Medication 1 CAPSULE: at 09:12

## 2017-12-03 RX ADMIN — CLOPIDOGREL 75 MG: 75 TABLET, FILM COATED ORAL at 09:12

## 2017-12-03 RX ADMIN — ASPIRIN 81 MG: 81 TABLET, COATED ORAL at 09:12

## 2017-12-03 RX ADMIN — HEPARIN SODIUM 5000 UNITS: 5000 INJECTION, SOLUTION INTRAVENOUS; SUBCUTANEOUS at 06:12

## 2017-12-03 RX ADMIN — PIPERACILLIN SODIUM AND TAZOBACTAM SODIUM 2.25 G: 2; .25 INJECTION, POWDER, FOR SOLUTION INTRAVENOUS at 06:12

## 2017-12-03 RX ADMIN — LANTHANUM CARBONATE 1000 MG: 500 TABLET, CHEWABLE ORAL at 09:12

## 2017-12-03 RX ADMIN — INSULIN ASPART 2 UNITS: 100 INJECTION, SOLUTION INTRAVENOUS; SUBCUTANEOUS at 01:12

## 2017-12-03 RX ADMIN — AMOXICILLIN 250 MG: 250 CAPSULE ORAL at 02:12

## 2017-12-03 RX ADMIN — CALCITRIOL 0.5 MCG: 0.25 CAPSULE, LIQUID FILLED ORAL at 09:12

## 2017-12-03 RX ADMIN — OSELTAMIVIR PHOSPHATE 30 MG: 6 POWDER, FOR SUSPENSION ORAL at 09:12

## 2017-12-03 NOTE — ASSESSMENT & PLAN NOTE
- States doesn't take Starlix  - diabetic diet  - accuchecks  - SSRI   - last A1C: 9.0 on 2/22/17    - A1C = 7.9 on 11/30/17

## 2017-12-03 NOTE — PROGRESS NOTES
"Nephrology  Progress Note    Admit Date: 11/29/2017   LOS: 2 days     SUBJECTIVE:     Follow-up For:  Pneumonia of right upper lobe due to infectious organism/ESRD    Interval History:   Less SOB.  Breathing improved after neb treatments.      Review of Systems:  Constitutional: No fever or chills  Respiratory:  shortness of breath  Cardiovascular: No chest pain or palpitations  Gastrointestinal: No nausea or vomiting  Neurological: No confusion or weakness    OBJECTIVE:     Vital Signs Range (Last 24H):  /67 (Patient Position: Sitting)   Pulse 93   Temp 99.3 °F (37.4 °C) (Oral)   Resp 18   Ht 5' 7" (1.702 m)   Wt 93 kg (205 lb)   LMP 01/28/2014 (Approximate)   SpO2 99%   Breastfeeding? No   BMI 32.11 kg/m²     Temp:  [98.7 °F (37.1 °C)-99.4 °F (37.4 °C)]   Pulse:  []   Resp:  [16-18]   BP: (131-136)/(67-80)   SpO2:  [92 %-99 %]     I & O (Last 24H):    Intake/Output Summary (Last 24 hours) at 12/03/17 1147  Last data filed at 12/02/17 1737   Gross per 24 hour   Intake              290 ml   Output                0 ml   Net              290 ml       Physical Exam:  General appearance: Well developed, well nourished  Eyes:  Conjunctivae/corneas clear. PERRL.  Lungs: Normal respiratory effort, scant rales at R base.  Heart: Regular rate and rhythm, S1, S2 normal, no murmur, rub or beau.  Abdomen: Soft ,obese bowel sounds normal; no masses, no organomegaly, obese  Extremities: No cyanosis or clubbing. No edema.    Skin: Skin color, texture, turgor normal. No rashes or lesions  Neurologic: Normal strength and tone. No focal numbness or weakness   Left IJ ERNIE    Laboratory Data:  No results for input(s): WBC, RBC, HGB, HCT, PLT, MCV, MCH, MCHC in the last 24 hours.    BMP:     Recent Labs  Lab 12/03/17  0557   *      K 4.5      CO2 23   BUN 30*   CREATININE 8.5*   CALCIUM 9.0   MG 2.0     Lab Results   Component Value Date    CALCIUM 9.0 12/03/2017    PHOS 5.1 (H) 12/03/2017 "       Medications:  Medication list was reviewed and changes noted under Assessment/Plan.    Diagnostic Results:    Impression:   1. No evidence of pulmonary thromboembolism.    2. Multiple patchy and consolidative opacities throughout the right lung, with the largest and most confluent component in the right upper lobe. Findings concerning for underlying infectious process such as pneumonia and/or aspiration. There a few patchy opacities in the lingula. Consider repeat evaluation with noncontrast chest CT in 3 months.    3. Calcified right pleural plaques, likely reflecting sequela of prior asbestos exposure.    4. Mediastinal and right hilar adenopathy, likely reactive in etiology.    5. Atherosclerosis of the aorta and coronary vessels as well as calcification of the aortic valve.      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  - HD MWF while inpatient.  - I have notified primary nephrologist Dr. Watt of pt's hospitalization.  - She dialyzes at [facility].  EDW reportedly 93kg.     HTN (I12.0)  - Amlodipine with hold parameters.  - Unclear why she is not on ACEi or ARB with history of CAD and DM.     Secondary HPTH and Hyperphosphatemia (N25.81, E83.39)  - Sensipar qd.  - Lanthanum tid with meals.     Anemia of CKD (D63.1)  - hold IV iron in setting of infection.  - Epo with HD.  -Transfuse if symptomatic or <7.5     Hypoxia  - Due to HCAP   - CTA negative for acute PE.  - Ordered incentive spirometry to the bedside.    HCAP  - switched abx from Ceftriaxone/Azithro to Vanc/Zosyn to cover for HCAP.  -covering for Flu also.    Laurent Wild MD  Nephrology

## 2017-12-03 NOTE — ASSESSMENT & PLAN NOTE
- No respiratory distress on exam.  - No h/o tobacco or oxygen use at home.  - SaO2 low 80s on room air at admit, now 95% RA.   - Lungs without wheeze, generally clear.  - CTA Chest no evidence for PE.  - By history has only reported a cold for 5 days with fever for last 2 days.   - Pulmonology followed.   - Hypoxia improved and oxygen saturation was 95-7% room air at rest and stable.   - Patient still recovering but feels much better and feels she can go home.

## 2017-12-03 NOTE — SUBJECTIVE & OBJECTIVE
Interval History:  Feeling better.  SaO2 95-7% room air.        Review of Systems  Objective:     Vital Signs (Most Recent):  Temp: 99.3 °F (37.4 °C) (12/03/17 0944)  Pulse: 93 (12/03/17 0944)  Resp: 18 (12/03/17 0944)  BP: 131/67 (12/03/17 0944)  SpO2: 99 % (12/03/17 0944) Vital Signs (24h Range):  Temp:  [98.7 °F (37.1 °C)-99.4 °F (37.4 °C)] 99.3 °F (37.4 °C)  Pulse:  [] 93  Resp:  [16-18] 18  SpO2:  [92 %-99 %] 99 %  BP: (131-136)/(67-80) 131/67     Weight: 93 kg (205 lb)  Body mass index is 32.11 kg/m².    Intake/Output Summary (Last 24 hours) at 12/03/17 1312  Last data filed at 12/02/17 1737   Gross per 24 hour   Intake              290 ml   Output                0 ml   Net              290 ml      Physical Exam   Constitutional: She is oriented to person, place, and time. She appears well-developed and well-nourished.   HENT:   Head: Normocephalic and atraumatic.   Eyes: Conjunctivae are normal. Pupils are equal, round, and reactive to light.   Neck: Normal range of motion. Neck supple.   Cardiovascular: Regular rhythm.    Murmur heard.  2/6 R2ICS   Pulmonary/Chest: Effort normal and breath sounds normal.   Abdominal: Soft. Bowel sounds are normal. There is no tenderness.   Musculoskeletal: She exhibits no edema or tenderness.   Neurological: She is alert and oriented to person, place, and time.   Skin: Skin is warm and dry.       Significant Labs:   BMP:   Recent Labs  Lab 12/03/17  0557   *      K 4.5      CO2 23   BUN 30*   CREATININE 8.5*   CALCIUM 9.0   MG 2.0     CBC: No results for input(s): WBC, HGB, HCT, PLT in the last 48 hours.    Significant Imaging: I have reviewed all pertinent imaging results/findings within the past 24 hours.   Imaging Results          X-Ray Chest PA And Lateral (Final result)  Result time 12/01/17 16:21:13    Final result by Franci Bragg MD (12/01/17 16:21:13)                 Impression:     As above.          Electronically signed by: FRANCI  VIOLETTA SKINNER  Date:     12/01/17  Time:    16:21              Narrative:    Chest PA and lateral    Indication:Pneumonia    Comparison:CT 11/30/2017, radiograph 11/29/2017    Findings:  The cardiomediastinal silhouette is stable.  Left central venous catheter tip projects over the distal SVC/Anne junction, stable in position.  There is no pleural effusion.  The trachea is midline.  The lungs are symmetrically expanded bilaterally tachycardia increased interstitial and parenchymal attenuation, worse on the right, developing infection is a consideration versus worsening edema.   There is no pneumothorax.  The osseous structures are unchanged.                             CTA Chest Non-Coronary (Final result)  Result time 11/30/17 22:30:35    Final result by Regina Sahu MD (11/30/17 22:30:35)                 Impression:      1.  No evidence of pulmonary thromboembolism.    2.  Multiple patchy and consolidative opacities throughout the right lung, with the largest and most confluent component in the right upper lobe.  Findings concerning for underlying infectious process such as pneumonia and/or aspiration.  There a few patchy opacities in the lingula.  Consider repeat evaluation with noncontrast chest CT in 3 months.    3.  Calcified right pleural plaques, likely reflecting sequela of prior asbestos exposure.    4.  Mediastinal and right hilar adenopathy, likely reactive in etiology.    5.  Atherosclerosis of the aorta and coronary vessels as well as calcification of the aortic valve.            Electronically signed by: REGINA SAHU  Date:     11/30/17  Time:    22:30              Narrative:    TECHNIQUE: The chest was surveyed from the costophrenic angles through the lung apices at 3-mm increments after the administration of 75 cc of Omnipaque 350 intravenous contrast material according to the PE protocol.  Axial, sagittal and coronal maximum intensity projection images were reviewed.    COMPARISON:Chest  radiograph 11/29/2017.    FINDINGS:  There is a left-sided central venous catheter in place with tip terminating in the right atrium.  Remaining visualized vascular and soft tissue structures at the base of the neck are within normal limits.  The heart is not enlarged.  There is no significant pericardial fluid.  There is calcific atherosclerosis of the coronary vessels.  The thoracic aorta is non-aneurysmal with moderate atherosclerosis.  There is calcification of the aortic valve.    There normal size axillary lymph nodes present.  There are prominent and enlarged mediastinal lymph nodes present including a precarinal lymph node measuring approximately 1.7 cm in short axis diameter.  There is also a conglomerate of subcarinal adenopathy and mild right hilar adenopathy.    The pulmonary arteries distributed normally.  There is no filling defects to indicate pulmonary thromboembolism.    The trachea and proximal airways are patent.  There are multiple patchy and consolidative opacities throughout the right upper, middle and lower lobes, with the most prominent component in the right upper lobe.  Overall, findings are concerning for underlying infectious process such as pneumonia or aspiration.  Clinical correlation recommended.  Consider reevaluation with noncontrast chest CT in 3 months.  The left lung demonstrates a few patchy nodular opacities in the lingula.  There is calcified right pleural plaque.  No pleural fluid or pneumothorax.    Incidentally visualized structures of the abdomen demonstrate significant atherosclerosis of the visceral vascular structures.  Visualized osseous structures demonstrate mild degenerative change without acute abnormality.                             X-Ray Chest PA And Lateral (Final result)  Result time 11/29/17 21:16:01    Final result by Adan Alevs MD (11/29/17 21:16:01)                 Impression:        Right upper lobe airspace opacity concerning for  pneumonia.      Electronically signed by: JEFF ESTRADA MD  Date:     11/29/17  Time:    21:16              Narrative:    Technique:  Chest PA and lateral radiographs    Comparison: 2/22/17    Findings:     Left IJ approach dialysis catheter terminates over the right atrium. There is a right upper lobe airspace opacity. Mild central vascular congestion. The mediastinal structures are midline. The cardiac silhouette is normal in size. The osseous structures demonstrate no acute abnormality.

## 2017-12-03 NOTE — ASSESSMENT & PLAN NOTE
- No respiratory distress on exam.  - No h/o tobacco or oxygen use at home.  - SaO2 low 80s on room air at admit, now 95% RA.   - Lungs without wheeze, generally clear.  - CTA Chest no evidence for PE.  - By history has only reported a cold for 5 days with fever for last 2 days.   - Pulmonology follwing.   - Hypoxia improving.

## 2017-12-03 NOTE — PLAN OF CARE
Problem: Patient Care Overview  Goal: Plan of Care Review  Outcome: Ongoing (interventions implemented as appropriate)  No injuries. Plan of care reviewed with patient. Verbalized understanding. Vitals stale. Will continue to monitor.

## 2017-12-03 NOTE — ASSESSMENT & PLAN NOTE
- States doesn't take Starlix  - diabetic diet  - last A1C: 9.0 on 2/22/17    - A1C = 7.9 on 11/30/17

## 2017-12-03 NOTE — ASSESSMENT & PLAN NOTE
- Vanc, Zosyn 4 days in hospital.  At rate of Vanc decrease, will likely remain therapeutic 7 days.   - Influenza swab neg, but gave Tamiflu course given CXR appearance and fever as influenza possible. Complete at home.   - Blood culture no growth.  - Complete another 3 days amoxicillin renally dosed at home.   - Follow up with PCP.

## 2017-12-03 NOTE — PROGRESS NOTES
[facility] Hospital Medicine  Progress Note    Patient Name: Jenni Todd  MRN: [id number]  Patient Class: IP- Inpatient   Admission Date: 11/29/2017  Length of Stay: 2 days  Attending Physician: Derek Merida MD  Primary Care Provider: Michael Tan Iii, MD        Subjective:     Principal Problem:Pneumonia of right upper lobe due to infectious organism    HPI:  Ms. Jenni Todd is a 47 y.o. female, with PMH of CAD, NIDDM-II, ESRD on dialysis (M/WF, Dr. Munroe; with recent Tevin catheter placement 2 weeks ago), anemia of chronic disease, and HTN, who presented to [facility] ED on 11/29/17 2/2 fever x 2 days. TMAX of 102.2 F, when taken at dialysis the past two sessions temp has been 101.0 F PO. Associated symptoms include chills, congestion, rhinorrhea, sore throat, cough, and eye discharge which all began 5 days ago. She denies chest pain, shortness of breath, nausea, vomiting, diarrhea, dysuria, myalgias, rash, headache, or confusion.      The patient was evaluated in the ED, and diagnosed with RUL PNA, started on ABX, and admitted for further management.     Hospital Course:  No notes on file    Interval History:  Feeling better.  SaO2 95-7% room air.        Review of Systems  Objective:     Vital Signs (Most Recent):  Temp: 99.3 °F (37.4 °C) (12/03/17 0944)  Pulse: 93 (12/03/17 0944)  Resp: 18 (12/03/17 0944)  BP: 131/67 (12/03/17 0944)  SpO2: 99 % (12/03/17 0944) Vital Signs (24h Range):  Temp:  [98.7 °F (37.1 °C)-99.4 °F (37.4 °C)] 99.3 °F (37.4 °C)  Pulse:  [] 93  Resp:  [16-18] 18  SpO2:  [92 %-99 %] 99 %  BP: (131-136)/(67-80) 131/67     Weight: 93 kg (205 lb)  Body mass index is 32.11 kg/m².    Intake/Output Summary (Last 24 hours) at 12/03/17 1312  Last data filed at 12/02/17 1737   Gross per 24 hour   Intake              290 ml   Output                0 ml   Net              290 ml      Physical Exam   Constitutional: She is oriented to person, place, and time.  She appears well-developed and well-nourished.   HENT:   Head: Normocephalic and atraumatic.   Eyes: Conjunctivae are normal. Pupils are equal, round, and reactive to light.   Neck: Normal range of motion. Neck supple.   Cardiovascular: Regular rhythm.    Murmur heard.  2/6 R2ICS   Pulmonary/Chest: Effort normal and breath sounds normal.   Abdominal: Soft. Bowel sounds are normal. There is no tenderness.   Musculoskeletal: She exhibits no edema or tenderness.   Neurological: She is alert and oriented to person, place, and time.   Skin: Skin is warm and dry.       Significant Labs:   BMP:   Recent Labs  Lab 12/03/17  0557   *      K 4.5      CO2 23   BUN 30*   CREATININE 8.5*   CALCIUM 9.0   MG 2.0     CBC: No results for input(s): WBC, HGB, HCT, PLT in the last 48 hours.    Significant Imaging: I have reviewed all pertinent imaging results/findings within the past 24 hours.   Imaging Results          X-Ray Chest PA And Lateral (Final result)  Result time 12/01/17 16:21:13    Final result by Franci Bargg MD (12/01/17 16:21:13)                 Impression:     As above.          Electronically signed by: FRANCI BRAGG MD  Date:     12/01/17  Time:    16:21              Narrative:    Chest PA and lateral    Indication:Pneumonia    Comparison:CT 11/30/2017, radiograph 11/29/2017    Findings:  The cardiomediastinal silhouette is stable.  Left central venous catheter tip projects over the distal SVC/Anne junction, stable in position.  There is no pleural effusion.  The trachea is midline.  The lungs are symmetrically expanded bilaterally tachycardia increased interstitial and parenchymal attenuation, worse on the right, developing infection is a consideration versus worsening edema.   There is no pneumothorax.  The osseous structures are unchanged.                             CTA Chest Non-Coronary (Final result)  Result time 11/30/17 22:30:35    Final result by Regina Sahu MD (11/30/17  22:30:35)                 Impression:      1.  No evidence of pulmonary thromboembolism.    2.  Multiple patchy and consolidative opacities throughout the right lung, with the largest and most confluent component in the right upper lobe.  Findings concerning for underlying infectious process such as pneumonia and/or aspiration.  There a few patchy opacities in the lingula.  Consider repeat evaluation with noncontrast chest CT in 3 months.    3.  Calcified right pleural plaques, likely reflecting sequela of prior asbestos exposure.    4.  Mediastinal and right hilar adenopathy, likely reactive in etiology.    5.  Atherosclerosis of the aorta and coronary vessels as well as calcification of the aortic valve.            Electronically signed by: JENNIFER NATHAN  Date:     11/30/17  Time:    22:30              Narrative:    TECHNIQUE: The chest was surveyed from the costophrenic angles through the lung apices at 3-mm increments after the administration of 75 cc of Omnipaque 350 intravenous contrast material according to the PE protocol.  Axial, sagittal and coronal maximum intensity projection images were reviewed.    COMPARISON:Chest radiograph 11/29/2017.    FINDINGS:  There is a left-sided central venous catheter in place with tip terminating in the right atrium.  Remaining visualized vascular and soft tissue structures at the base of the neck are within normal limits.  The heart is not enlarged.  There is no significant pericardial fluid.  There is calcific atherosclerosis of the coronary vessels.  The thoracic aorta is non-aneurysmal with moderate atherosclerosis.  There is calcification of the aortic valve.    There normal size axillary lymph nodes present.  There are prominent and enlarged mediastinal lymph nodes present including a precarinal lymph node measuring approximately 1.7 cm in short axis diameter.  There is also a conglomerate of subcarinal adenopathy and mild right hilar adenopathy.    The pulmonary  arteries distributed normally.  There is no filling defects to indicate pulmonary thromboembolism.    The trachea and proximal airways are patent.  There are multiple patchy and consolidative opacities throughout the right upper, middle and lower lobes, with the most prominent component in the right upper lobe.  Overall, findings are concerning for underlying infectious process such as pneumonia or aspiration.  Clinical correlation recommended.  Consider reevaluation with noncontrast chest CT in 3 months.  The left lung demonstrates a few patchy nodular opacities in the lingula.  There is calcified right pleural plaque.  No pleural fluid or pneumothorax.    Incidentally visualized structures of the abdomen demonstrate significant atherosclerosis of the visceral vascular structures.  Visualized osseous structures demonstrate mild degenerative change without acute abnormality.                             X-Ray Chest PA And Lateral (Final result)  Result time 11/29/17 21:16:01    Final result by Adan Alves MD (11/29/17 21:16:01)                 Impression:        Right upper lobe airspace opacity concerning for pneumonia.      Electronically signed by: ADAN ALVES MD  Date:     11/29/17  Time:    21:16              Narrative:    Technique:  Chest PA and lateral radiographs    Comparison: 2/22/17    Findings:     Left IJ approach dialysis catheter terminates over the right atrium. There is a right upper lobe airspace opacity. Mild central vascular congestion. The mediastinal structures are midline. The cardiac silhouette is normal in size. The osseous structures demonstrate no acute abnormality.                                Assessment/Plan:      * Pneumonia of right upper lobe due to infectious organism    - Vanc, Zosyn day 4  - Influenza swab neg, but will give Tamiflu renally dosed day 3.  - Follow blood culture.          Acute respiratory failure with hypoxia and hypercarbia    - No respiratory  distress on exam.  - No h/o tobacco or oxygen use at home.  - SaO2 low 80s on room air at admit, now 95% RA.   - Lungs without wheeze, generally clear.  - CTA Chest no evidence for PE.  - By history has only reported a cold for 5 days with fever for last 2 days.   - Pulmonology follwing.   - Hypoxia improving.                     H/O: CVA (cerebrovascular accident)              Type 2 diabetes mellitus with diabetic chronic kidney disease    - States doesn't take Starlix  - diabetic diet  - accuchecks  - SSRI   - last A1C: 9.0 on 2/22/17    - A1C = 7.9 on 11/30/17          CAD (coronary artery disease) with recent PCI 12/18/2012    - continue ASA   - continue plavix (patient has been taking since stenting in 2012 2/2 2 stents s/p MI)        Hyperlipidemia    - continue atorvastatin 40 mg QD  - last lipid panel:    2/24/2017 05:46   Cholesterol 199   HDL 19 (L)   LDL Cholesterol Invalid, Trig>400.0   Total Cholesterol/HDL Ratio 10.5 (H)   Triglycerides 460 (H)             Hypertension    - continue home meds    - amlodipine 10 mg QD   - BP moderately elevated upon admission   - hydralazine PRN          Anemia associated with chronic renal failure    - H&H mildly decreased from apparent baseline   - no active bleeding at present  - monitor           ESRD (end stage renal disease) on PD ( initiated dialysis 04/20/2004)    - Nephrology following   - normal dialysis M/W/F            VTE Risk Mitigation         Ordered     heparin (porcine) injection 5,000 Units  Every 8 hours     Route:  Subcutaneous        11/30/17 1653     Medium Risk of VTE  Once      11/30/17 0347     Reason for No Pharmacological VTE Prophylaxis  Once      11/30/17 0347     Place MEERA hose  Until discontinued      11/29/17 2359     Place sequential compression device  Until discontinued      11/29/17 8219              Derek Merida MD  Department of Hospital Medicine   [facility]

## 2017-12-03 NOTE — DISCHARGE SUMMARY
[facility] Hospital Medicine  Discharge Summary      Patient Name: Jenni Todd  MRN: [id number]  Admission Date: 11/29/2017  Hospital Length of Stay: 2 days  Discharge Date and Time:  12/03/2017 2:29 PM  Attending Physician: Luz Rosario MD   Discharging Provider: Luz Rosario MD  Primary Care Provider: Michael Tan Iii, MD      HPI:   Ms. Jenni Todd is a 47 y.o. female, with PMH of CAD, NIDDM-II, ESRD on dialysis (M/WF, Dr. Munroe; with recent Tevin catheter placement 2 weeks ago), anemia of chronic disease, and HTN, who presented to [facility] ED on 11/29/17 2/2 fever x 2 days. TMAX of 102.2 F, when taken at dialysis the past two sessions temp has been 101.0 F PO. Associated symptoms include chills, congestion, rhinorrhea, sore throat, cough, and eye discharge which all began 5 days ago. She denies chest pain, shortness of breath, nausea, vomiting, diarrhea, dysuria, myalgias, rash, headache, or confusion.      The patient was evaluated in the ED, and diagnosed with RUL PNA, started on ABX, and admitted for further management.     * No surgery found *      Hospital Course:   No notes on file     Consults:   Consults         Status Ordering Provider     Inpatient consult to Nephrology-[place]  Once     Provider:  Laurent Wild MD    Completed LUZ ROSARIO     Inpatient consult to Pulmonology  Once     Provider:  Kiran Escamilla III, MD    Completed LUZ ROSARIO     IP consult to dietary  Once     Provider:  (Not yet assigned)    Completed LUZ ROSARIO          * Pneumonia of right upper lobe due to infectious organism    - Vanc, Zosyn 4 days in hospital.  At rate of Vanc decrease, will likely remain therapeutic 7 days.   - Influenza swab neg, but gave Tamiflu course given CXR appearance and fever as influenza possible. Complete at home.   - Blood culture no growth.  - Complete another 3 days amoxicillin renally dosed at home.   - Follow up with PCP.          Acute  respiratory failure with hypoxia and hypercarbia    - No respiratory distress on exam.  - No h/o tobacco or oxygen use at home.  - SaO2 low 80s on room air at admit, now 95% RA.   - Lungs without wheeze, generally clear.  - CTA Chest no evidence for PE.  - By history has only reported a cold for 5 days with fever for last 2 days.   - Pulmonology followed.   - Hypoxia improved and oxygen saturation was 95-7% room air at rest and stable.   - Patient still recovering but feels much better and feels she can go home.                      H/O: CVA (cerebrovascular accident)              Type 2 diabetes mellitus with diabetic chronic kidney disease    - States doesn't take Starlix  - diabetic diet  - last A1C: 9.0 on 2/22/17    - A1C = 7.9 on 11/30/17          CAD (coronary artery disease) with recent PCI 12/18/2012    - continue ASA   - continue plavix (patient has been taking since stenting in 2012 2/2 2 stents s/p MI)        Hyperlipidemia    - continue atorvastatin 40 mg QD  - last lipid panel:    2/24/2017 05:46   Cholesterol 199   HDL 19 (L)   LDL Cholesterol Invalid, Trig>400.0   Total Cholesterol/HDL Ratio 10.5 (H)   Triglycerides 460 (H)             Hypertension    - continue home meds    - amlodipine 10 mg QD             Anemia associated with chronic renal failure    - of ESRD.            ESRD (end stage renal disease) on PD ( initiated dialysis 04/20/2004)    - Nephrology followed   - normal dialysis M/W/F            Good trend of daily improvement and likely to continue to do well completing treatment at home.  Discussed may return to hospital if any worsening and patient expressed understanding.       Final Active Diagnoses:    Diagnosis Date Noted POA    PRINCIPAL PROBLEM:  Pneumonia of right upper lobe due to infectious organism [J18.1] 11/29/2017 Yes    Acute respiratory failure with hypoxia and hypercarbia [J96.01, J96.02] 11/30/2017 Yes    H/O: CVA (cerebrovascular accident) [Z86.73]  Not Applicable     Type 2 diabetes mellitus with diabetic chronic kidney disease [E11.22]  Yes    Anemia associated with chronic renal failure [D63.1]  Yes    Hypertension [I10]  Yes    Hyperlipidemia [E78.5]  Yes    CAD (coronary artery disease) with recent PCI 12/18/2012 [I25.10]  Yes    ESRD (end stage renal disease) on PD ( initiated dialysis 04/20/2004) [N18.6] 04/20/2004 Yes      Problems Resolved During this Admission:    Diagnosis Date Noted Date Resolved POA       Discharged Condition: fair    Disposition: Home or Self Care    Follow Up:  Follow-up Information     Michael Tan Iii, MD In 3 days.    Specialty:  Family Medicine  Contact information:  [address]  [address]  [place] [id number] [phone]                 Patient Instructions:     Diet general   Order Specific Question Answer Comments   Additional restrictions: Diabetic 1800      Activity as tolerated     Call MD for:  increased confusion or weakness     Call MD for:  persistent dizziness, light-headedness, or visual disturbances     Call MD for:  worsening rash     Call MD for:  severe persistent headache     Call MD for:  difficulty breathing or increased cough     Call MD for:  severe uncontrolled pain     Call MD for:  persistent nausea and vomiting or diarrhea     Call MD for:  temperature >100.4         Significant Diagnostic Studies:   Imaging Results          X-Ray Chest PA And Lateral (Final result)  Result time 12/01/17 16:21:13    Final result by Andrew Shipley MD (12/01/17 16:21:13)                 Impression:     As above.          Electronically signed by: ANDREW SHIPLEY MD  Date:     12/01/17  Time:    16:21              Narrative:    Chest PA and lateral    Indication:Pneumonia    Comparison:CT 11/30/2017, radiograph 11/29/2017    Findings:  The cardiomediastinal silhouette is stable.  Left central venous catheter tip projects over the distal SVC/Anne junction, stable in position.  There is no pleural effusion.  The trachea is  midline.  The lungs are symmetrically expanded bilaterally tachycardia increased interstitial and parenchymal attenuation, worse on the right, developing infection is a consideration versus worsening edema.   There is no pneumothorax.  The osseous structures are unchanged.                             CTA Chest Non-Coronary (Final result)  Result time 11/30/17 22:30:35    Final result by Regina Sahu MD (11/30/17 22:30:35)                 Impression:      1.  No evidence of pulmonary thromboembolism.    2.  Multiple patchy and consolidative opacities throughout the right lung, with the largest and most confluent component in the right upper lobe.  Findings concerning for underlying infectious process such as pneumonia and/or aspiration.  There a few patchy opacities in the lingula.  Consider repeat evaluation with noncontrast chest CT in 3 months.    3.  Calcified right pleural plaques, likely reflecting sequela of prior asbestos exposure.    4.  Mediastinal and right hilar adenopathy, likely reactive in etiology.    5.  Atherosclerosis of the aorta and coronary vessels as well as calcification of the aortic valve.            Electronically signed by: REGINA SAHU  Date:     11/30/17  Time:    22:30              Narrative:    TECHNIQUE: The chest was surveyed from the costophrenic angles through the lung apices at 3-mm increments after the administration of 75 cc of Omnipaque 350 intravenous contrast material according to the PE protocol.  Axial, sagittal and coronal maximum intensity projection images were reviewed.    COMPARISON:Chest radiograph 11/29/2017.    FINDINGS:  There is a left-sided central venous catheter in place with tip terminating in the right atrium.  Remaining visualized vascular and soft tissue structures at the base of the neck are within normal limits.  The heart is not enlarged.  There is no significant pericardial fluid.  There is calcific atherosclerosis of the coronary vessels.  The  thoracic aorta is non-aneurysmal with moderate atherosclerosis.  There is calcification of the aortic valve.    There normal size axillary lymph nodes present.  There are prominent and enlarged mediastinal lymph nodes present including a precarinal lymph node measuring approximately 1.7 cm in short axis diameter.  There is also a conglomerate of subcarinal adenopathy and mild right hilar adenopathy.    The pulmonary arteries distributed normally.  There is no filling defects to indicate pulmonary thromboembolism.    The trachea and proximal airways are patent.  There are multiple patchy and consolidative opacities throughout the right upper, middle and lower lobes, with the most prominent component in the right upper lobe.  Overall, findings are concerning for underlying infectious process such as pneumonia or aspiration.  Clinical correlation recommended.  Consider reevaluation with noncontrast chest CT in 3 months.  The left lung demonstrates a few patchy nodular opacities in the lingula.  There is calcified right pleural plaque.  No pleural fluid or pneumothorax.    Incidentally visualized structures of the abdomen demonstrate significant atherosclerosis of the visceral vascular structures.  Visualized osseous structures demonstrate mild degenerative change without acute abnormality.                             X-Ray Chest PA And Lateral (Final result)  Result time 11/29/17 21:16:01    Final result by Adan Alves MD (11/29/17 21:16:01)                 Impression:        Right upper lobe airspace opacity concerning for pneumonia.      Electronically signed by: ADAN ALVES MD  Date:     11/29/17  Time:    21:16              Narrative:    Technique:  Chest PA and lateral radiographs    Comparison: 2/22/17    Findings:     Left IJ approach dialysis catheter terminates over the right atrium. There is a right upper lobe airspace opacity. Mild central vascular congestion. The mediastinal structures are  midline. The cardiac silhouette is normal in size. The osseous structures demonstrate no acute abnormality.                              Pending Diagnostic Studies:     Procedure Component Value Units Date/Time    Hepatitis B Surface Antibody, Qual/Quant [[id number]] Collected:  12/01/17 0416    Order Status:  Sent Lab Status:  In process Updated:  12/01/17 2255    Specimen:  Blood from Blood     X-Ray Chest PA And Lateral [[id number]]     Order Status:  Sent Lab Status:  No result          Medications:  Reconciled Home Medications:   Current Discharge Medication List      START taking these medications    Details   amoxicillin (AMOXIL) 500 MG Tab Take 1 tablet (500 mg total) by mouth once daily.  Qty: 3 tablet, Refills: 0    Comments: Start Monday, 12/4/17  Associated Diagnoses: Pneumonia of right upper lobe due to infectious organism      oseltamivir (TAMIFLU) 30 MG capsule Take 1 capsule (30 mg total) by mouth once daily.  Qty: 1 capsule, Refills: 0    Associated Diagnoses: Pneumonia of right upper lobe due to infectious organism         CONTINUE these medications which have NOT CHANGED    Details   amlodipine (NORVASC) 10 MG tablet Take 10 mg by mouth once daily.       aspirin (ECOTRIN) 81 MG EC tablet Take 1 tablet (81 mg total) by mouth once daily.  Qty: 30 tablet, Refills: 12      atorvastatin (LIPITOR) 40 MG tablet Take 1 tablet (40 mg total) by mouth every evening.  Qty: 90 tablet, Refills: 1    Associated Diagnoses: Cerebral infarction due to thrombosis of cerebral artery      calcitRIOL (ROCALTROL) 0.5 MCG Cap Take 1 capsule by mouth 2 (two) times daily.       cinacalcet (SENSIPAR) 90 MG Tab Take 1 tablet by mouth once daily.       epoetin adonay 10,000 unit/mL Soln 1 mL, epoetin adonay 20,000 unit/mL Soln 1 mL Inject 30,000 Units into the vein every 14 (fourteen) days.      gabapentin (NEURONTIN) 300 MG capsule 3 CAPS PM,AM,NOON THEN 3 CAPS PM, 3CAPS AM & 2 NOON THEN INCREASE TO 3 CAPS 3 TIMES A DAY  Qty: 270  capsule, Refills: 2      lanthanum (FOSRENOL) 1000 MG chewable tablet Take 1,000 mg by mouth 3 (three) times daily with meals.        ONETOUCH VERIO Strp USE 1 STRIP TWICE DAILY IN VITRO  Refills: 3      PLAVIX 75 mg tablet TAKE 1 BY MOUTH DAILY  Qty: 90 tablet, Refills: 0      vitamin renal formula, B-complex-vitamin c-folic acid, (B COMPLEX-C-FOLIC ACID) 1 mg Cap Take 1 capsule by mouth once daily. 1 Capsule Oral Every day      cilostazol (PLETAL) 50 MG Tab Take 1 tablet (50 mg total) by mouth 2 (two) times daily.  Qty: 180 tablet, Refills: 3    Associated Diagnoses: PAD (peripheral artery disease)      nateglinide (STARLIX) 120 MG tablet STARLIX 120MG 30 MINUTES BEFORE EACH MEAL.             Indwelling Lines/Drains at time of discharge:   Lines/Drains/Airways     Central Venous Catheter Line                 Hemodialysis Catheter 11/13/17 1019 left internal jugular 20 days          Drain                 Hemodialysis AV Fistula Left upper arm -- days                Time spent on the discharge of patient: > 30  minutes  Patient was seen and examined on the date of discharge and determined to be suitable for discharge.         Derek Merida MD  Department of Hospital Medicine  [facility]

## 2017-12-03 NOTE — ASSESSMENT & PLAN NOTE
- Vanc, Zosyn day 4  - Influenza swab neg, but will give Tamiflu renally dosed day 3.  - Follow blood culture.

## 2017-12-03 NOTE — PLAN OF CARE
Problem: Patient Care Overview  Goal: Plan of Care Review  Outcome: Ongoing (interventions implemented as appropriate)  Pt in no distress on 2L [place], one prn tx given. Will continue to monitor.

## 2017-12-03 NOTE — PROGRESS NOTES
SpO2 96% on room air at rest - tolerating po - no vomiting    VSS - AF   NAD  Obese  Awake, alert  Trachea midline  Heart reg  Lungs few rales right base  Abd NT  Ext no pitting edema      A: acute resp failure with hypoxia - improving      URI with pneumonia  P: discussed with Dr Merida - ok to discharge with po amoxicillin for 3 more days - will not need supplemental O2

## 2017-12-03 NOTE — PLAN OF CARE
Problem: Patient Care Overview  Goal: Plan of Care Review  Outcome: Ongoing (interventions implemented as appropriate)  Pt free of fall or injury during shift.  States she feels a little better today.  Denies pain.  VSS on 3L NC now.  Denies SOB.  Positions self independently in bed. Poor appetite today but has eaten small amounts; has slept most of shift but has been awake more than yesterday.  No fever today requiring tylenol.  Purposeful rounding performed and safety maintained.  Pt resting comfortably in bed, no other complaints at this time.  Family at bedside.   Will continue to monitor.

## 2017-12-04 LAB
HEP. B SURF AB, QUAL: NEGATIVE
HEP. B SURF AB, QUANT.: <3 MIU/ML

## 2017-12-05 LAB
BACTERIA BLD CULT: NORMAL
BACTERIA BLD CULT: NORMAL

## 2017-12-11 ENCOUNTER — TELEPHONE (OUTPATIENT)
Dept: CARDIOLOGY | Facility: CLINIC | Age: 47
End: 2017-12-11

## 2017-12-11 NOTE — TELEPHONE ENCOUNTER
----- Message from Argenis Stephens sent at 12/11/2017 10:13 AM CST -----  Contact: [phone]/self  Patient requesting to speak with you regarding a stress test done on 11/29/17. Please call and advise.

## 2017-12-12 NOTE — TELEPHONE ENCOUNTER
----- Message from Kenna Butler sent at 12/12/2017  2:36 PM CST -----  Contact: [phone]  Patient called in returning your jesi l from yesterday . Please advise

## 2017-12-13 ENCOUNTER — TELEPHONE (OUTPATIENT)
Dept: CARDIOLOGY | Facility: CLINIC | Age: 47
End: 2017-12-13

## 2017-12-13 NOTE — TELEPHONE ENCOUNTER
----- Message from Za Almanza sent at 12/12/2017  4:22 PM CST -----  Contact: Self [phone]  Patient Returning Your Phone Call

## 2017-12-18 RX ORDER — GABAPENTIN 300 MG/1
CAPSULE ORAL
Qty: 270 CAPSULE | Refills: 2 | OUTPATIENT
Start: 2017-12-18

## 2018-01-01 ENCOUNTER — HOSPITAL ENCOUNTER (INPATIENT)
Facility: HOSPITAL | Age: 48
LOS: 4 days | Discharge: NURSING, REHAB OR LONG-TERM CARE | DRG: 393 | End: 2018-09-24
Attending: EMERGENCY MEDICINE | Admitting: INTERNAL MEDICINE
Payer: MEDICARE

## 2018-01-01 ENCOUNTER — TELEPHONE (OUTPATIENT)
Dept: FAMILY MEDICINE | Facility: HOSPITAL | Age: 48
End: 2018-01-01

## 2018-01-01 ENCOUNTER — TELEPHONE (OUTPATIENT)
Dept: GASTROENTEROLOGY | Facility: CLINIC | Age: 48
End: 2018-01-01

## 2018-01-01 ENCOUNTER — ANESTHESIA EVENT (OUTPATIENT)
Dept: SURGERY | Facility: OTHER | Age: 48
DRG: 480 | End: 2018-01-01
Payer: MEDICARE

## 2018-01-01 ENCOUNTER — ANESTHESIA (OUTPATIENT)
Dept: ENDOSCOPY | Facility: HOSPITAL | Age: 48
DRG: 393 | End: 2018-01-01
Payer: MEDICARE

## 2018-01-01 ENCOUNTER — ANESTHESIA EVENT (OUTPATIENT)
Dept: ENDOSCOPY | Facility: HOSPITAL | Age: 48
DRG: 393 | End: 2018-01-01
Payer: MEDICARE

## 2018-01-01 ENCOUNTER — HOSPITAL ENCOUNTER (INPATIENT)
Facility: OTHER | Age: 48
LOS: 5 days | Discharge: NURSING, REHAB OR LONG-TERM CARE | DRG: 480 | End: 2018-09-17
Attending: EMERGENCY MEDICINE | Admitting: EMERGENCY MEDICINE
Payer: MEDICARE

## 2018-01-01 ENCOUNTER — ANESTHESIA (OUTPATIENT)
Dept: SURGERY | Facility: OTHER | Age: 48
DRG: 480 | End: 2018-01-01
Payer: MEDICARE

## 2018-01-01 VITALS
TEMPERATURE: 98 F | HEIGHT: 67 IN | SYSTOLIC BLOOD PRESSURE: 146 MMHG | DIASTOLIC BLOOD PRESSURE: 70 MMHG | RESPIRATION RATE: 19 BRPM | HEIGHT: 67 IN | HEART RATE: 83 BPM | HEART RATE: 86 BPM | OXYGEN SATURATION: 100 % | TEMPERATURE: 98 F | SYSTOLIC BLOOD PRESSURE: 123 MMHG | WEIGHT: 225 LBS | OXYGEN SATURATION: 100 % | BODY MASS INDEX: 35.31 KG/M2 | BODY MASS INDEX: 34.53 KG/M2 | DIASTOLIC BLOOD PRESSURE: 67 MMHG | RESPIRATION RATE: 18 BRPM | WEIGHT: 220 LBS

## 2018-01-01 DIAGNOSIS — Z99.2 TYPE 2 DIABETES MELLITUS WITH CHRONIC KIDNEY DISEASE ON CHRONIC DIALYSIS, WITHOUT LONG-TERM CURRENT USE OF INSULIN: ICD-10-CM

## 2018-01-01 DIAGNOSIS — K92.2 ACUTE GI BLEEDING: Primary | ICD-10-CM

## 2018-01-01 DIAGNOSIS — M89.9 CHRONIC KIDNEY DISEASE-MINERAL AND BONE DISORDER: ICD-10-CM

## 2018-01-01 DIAGNOSIS — E83.9 CHRONIC KIDNEY DISEASE-MINERAL AND BONE DISORDER: ICD-10-CM

## 2018-01-01 DIAGNOSIS — E11.22 TYPE 2 DIABETES MELLITUS WITH CHRONIC KIDNEY DISEASE ON CHRONIC DIALYSIS, WITHOUT LONG-TERM CURRENT USE OF INSULIN: ICD-10-CM

## 2018-01-01 DIAGNOSIS — E87.29 METABOLIC ACIDOSIS, INCREASED ANION GAP: ICD-10-CM

## 2018-01-01 DIAGNOSIS — D63.1 ANEMIA IN CHRONIC KIDNEY DISEASE, ON CHRONIC DIALYSIS: ICD-10-CM

## 2018-01-01 DIAGNOSIS — S72.002A CLOSED FRACTURE OF LEFT HIP: ICD-10-CM

## 2018-01-01 DIAGNOSIS — K92.2 ACUTE LOWER GI BLEEDING: ICD-10-CM

## 2018-01-01 DIAGNOSIS — D68.00 VON WILLEBRAND DISEASE: ICD-10-CM

## 2018-01-01 DIAGNOSIS — Z99.2 ESRD (END STAGE RENAL DISEASE) ON DIALYSIS: ICD-10-CM

## 2018-01-01 DIAGNOSIS — Z76.82 ORGAN TRANSPLANT CANDIDATE: ICD-10-CM

## 2018-01-01 DIAGNOSIS — I10 ESSENTIAL HYPERTENSION: ICD-10-CM

## 2018-01-01 DIAGNOSIS — N18.6 TYPE 2 DIABETES MELLITUS WITH CHRONIC KIDNEY DISEASE ON CHRONIC DIALYSIS, WITHOUT LONG-TERM CURRENT USE OF INSULIN: ICD-10-CM

## 2018-01-01 DIAGNOSIS — E88.09 HYPOALBUMINEMIA: ICD-10-CM

## 2018-01-01 DIAGNOSIS — S72.002D CLOSED FRACTURE OF LEFT HIP WITH ROUTINE HEALING: Primary | ICD-10-CM

## 2018-01-01 DIAGNOSIS — Z76.82 AWAITING ORGAN TRANSPLANT STATUS: Primary | ICD-10-CM

## 2018-01-01 DIAGNOSIS — T14.90XA TRAUMA: ICD-10-CM

## 2018-01-01 DIAGNOSIS — D68.51 FACTOR 5 LEIDEN MUTATION, HETEROZYGOUS: ICD-10-CM

## 2018-01-01 DIAGNOSIS — N18.9 CHRONIC KIDNEY DISEASE-MINERAL AND BONE DISORDER: ICD-10-CM

## 2018-01-01 DIAGNOSIS — D75.839 THROMBOCYTOSIS: ICD-10-CM

## 2018-01-01 DIAGNOSIS — S72.002A CLOSED FRACTURE OF LEFT HIP, INITIAL ENCOUNTER: ICD-10-CM

## 2018-01-01 DIAGNOSIS — N18.6 ESRD (END STAGE RENAL DISEASE) ON DIALYSIS: ICD-10-CM

## 2018-01-01 DIAGNOSIS — N18.6 ANEMIA IN CHRONIC KIDNEY DISEASE, ON CHRONIC DIALYSIS: ICD-10-CM

## 2018-01-01 DIAGNOSIS — N18.6 ESRD (END STAGE RENAL DISEASE): ICD-10-CM

## 2018-01-01 DIAGNOSIS — Z99.2: ICD-10-CM

## 2018-01-01 DIAGNOSIS — Z99.2 ANEMIA IN CHRONIC KIDNEY DISEASE, ON CHRONIC DIALYSIS: ICD-10-CM

## 2018-01-01 LAB
ABO + RH BLD: NORMAL
ALBUMIN SERPL BCP-MCNC: 1.9 G/DL
ALBUMIN SERPL BCP-MCNC: 2.2 G/DL
ALBUMIN SERPL BCP-MCNC: 2.3 G/DL
ALBUMIN SERPL BCP-MCNC: 2.5 G/DL
ALBUMIN SERPL BCP-MCNC: 2.6 G/DL
ALBUMIN SERPL BCP-MCNC: 2.7 G/DL
ALBUMIN SERPL BCP-MCNC: 2.9 G/DL
ALLENS TEST: ABNORMAL
ALLENS TEST: ABNORMAL
ALP SERPL-CCNC: 109 U/L
ALP SERPL-CCNC: 115 U/L
ALP SERPL-CCNC: 121 U/L
ALP SERPL-CCNC: 127 U/L
ALP SERPL-CCNC: 128 U/L
ALP SERPL-CCNC: 132 U/L
ALP SERPL-CCNC: 133 U/L
ALP SERPL-CCNC: 135 U/L
ALP SERPL-CCNC: 141 U/L
ALP SERPL-CCNC: 152 U/L
ALP SERPL-CCNC: 156 U/L
ALT SERPL W/O P-5'-P-CCNC: 17 U/L
ALT SERPL W/O P-5'-P-CCNC: 17 U/L
ALT SERPL W/O P-5'-P-CCNC: 171 U/L
ALT SERPL W/O P-5'-P-CCNC: 191 U/L
ALT SERPL W/O P-5'-P-CCNC: 5 U/L
ALT SERPL W/O P-5'-P-CCNC: 5 U/L
ALT SERPL W/O P-5'-P-CCNC: 6 U/L
ALT SERPL W/O P-5'-P-CCNC: 60 U/L
ALT SERPL W/O P-5'-P-CCNC: <5 U/L
ANION GAP SERPL CALC-SCNC: 16 MMOL/L
ANION GAP SERPL CALC-SCNC: 18 MMOL/L
ANION GAP SERPL CALC-SCNC: 19 MMOL/L
ANION GAP SERPL CALC-SCNC: 19 MMOL/L
ANION GAP SERPL CALC-SCNC: 20 MMOL/L
ANION GAP SERPL CALC-SCNC: 21 MMOL/L
ANION GAP SERPL CALC-SCNC: 21 MMOL/L
ANION GAP SERPL CALC-SCNC: 22 MMOL/L
ANION GAP SERPL CALC-SCNC: 23 MMOL/L
APTT BLDCRRT: 26.3 SEC
AST SERPL-CCNC: 15 U/L
AST SERPL-CCNC: 154 U/L
AST SERPL-CCNC: 159 U/L
AST SERPL-CCNC: 18 U/L
AST SERPL-CCNC: 19 U/L
AST SERPL-CCNC: 202 U/L
AST SERPL-CCNC: 22 U/L
AST SERPL-CCNC: 22 U/L
AST SERPL-CCNC: 32 U/L
AST SERPL-CCNC: 48 U/L
AST SERPL-CCNC: 99 U/L
BASOPHILS # BLD AUTO: 0.01 K/UL
BASOPHILS # BLD AUTO: 0.02 K/UL
BASOPHILS # BLD AUTO: 0.03 K/UL
BASOPHILS # BLD AUTO: 0.03 K/UL
BASOPHILS # BLD AUTO: 0.04 K/UL
BASOPHILS # BLD AUTO: 0.04 K/UL
BASOPHILS # BLD AUTO: 0.06 K/UL
BASOPHILS NFR BLD: 0.1 %
BASOPHILS NFR BLD: 0.2 %
BASOPHILS NFR BLD: 0.3 %
BASOPHILS NFR BLD: 0.4 %
BILIRUB SERPL-MCNC: 0.4 MG/DL
BILIRUB SERPL-MCNC: 0.5 MG/DL
BILIRUB SERPL-MCNC: 0.6 MG/DL
BILIRUB SERPL-MCNC: 0.6 MG/DL
BLD GP AB SCN CELLS X3 SERPL QL: NORMAL
BLD PROD TYP BPU: NORMAL
BLOOD UNIT EXPIRATION DATE: NORMAL
BLOOD UNIT TYPE CODE: 1700
BLOOD UNIT TYPE CODE: 5100
BLOOD UNIT TYPE CODE: 7300
BLOOD UNIT TYPE: NORMAL
BUN SERPL-MCNC: 57 MG/DL
BUN SERPL-MCNC: 59 MG/DL
BUN SERPL-MCNC: 61 MG/DL
BUN SERPL-MCNC: 62 MG/DL
BUN SERPL-MCNC: 64 MG/DL
BUN SERPL-MCNC: 65 MG/DL
BUN SERPL-MCNC: 65 MG/DL
BUN SERPL-MCNC: 66 MG/DL
BUN SERPL-MCNC: 67 MG/DL
BUN SERPL-MCNC: 69 MG/DL
BUN SERPL-MCNC: 70 MG/DL
BUN SERPL-MCNC: 75 MG/DL
CALCIUM SERPL-MCNC: 7.4 MG/DL
CALCIUM SERPL-MCNC: 7.7 MG/DL
CALCIUM SERPL-MCNC: 7.8 MG/DL
CALCIUM SERPL-MCNC: 7.9 MG/DL
CALCIUM SERPL-MCNC: 8.1 MG/DL
CALCIUM SERPL-MCNC: 8.2 MG/DL
CALCIUM SERPL-MCNC: 8.3 MG/DL
CALCIUM SERPL-MCNC: 8.3 MG/DL
CALCIUM SERPL-MCNC: 9 MG/DL
CALCIUM SERPL-MCNC: 9.1 MG/DL
CALCIUM SERPL-MCNC: 9.1 MG/DL
CALCIUM SERPL-MCNC: 9.4 MG/DL
CHLORIDE SERPL-SCNC: 89 MMOL/L
CHLORIDE SERPL-SCNC: 90 MMOL/L
CHLORIDE SERPL-SCNC: 92 MMOL/L
CHLORIDE SERPL-SCNC: 93 MMOL/L
CHLORIDE SERPL-SCNC: 94 MMOL/L
CHLORIDE SERPL-SCNC: 94 MMOL/L
CHLORIDE SERPL-SCNC: 95 MMOL/L
CO2 SERPL-SCNC: 19 MMOL/L
CO2 SERPL-SCNC: 20 MMOL/L
CO2 SERPL-SCNC: 21 MMOL/L
CO2 SERPL-SCNC: 21 MMOL/L
CO2 SERPL-SCNC: 22 MMOL/L
CO2 SERPL-SCNC: 23 MMOL/L
CO2 SERPL-SCNC: 24 MMOL/L
CODING SYSTEM: NORMAL
CREAT SERPL-MCNC: 10.5 MG/DL
CREAT SERPL-MCNC: 10.6 MG/DL
CREAT SERPL-MCNC: 11 MG/DL
CREAT SERPL-MCNC: 11.1 MG/DL
CREAT SERPL-MCNC: 11.3 MG/DL
CREAT SERPL-MCNC: 12.2 MG/DL
CREAT SERPL-MCNC: 12.8 MG/DL
CREAT SERPL-MCNC: 13.5 MG/DL
CREAT SERPL-MCNC: 13.9 MG/DL
CREAT SERPL-MCNC: 14.8 MG/DL
CREAT SERPL-MCNC: 15 MG/DL
CREAT SERPL-MCNC: 15.7 MG/DL
DELSYS: ABNORMAL
DELSYS: ABNORMAL
DIFFERENTIAL METHOD: ABNORMAL
DISPENSE STATUS: NORMAL
EOSINOPHIL # BLD AUTO: 0 K/UL
EOSINOPHIL # BLD AUTO: 0.1 K/UL
EOSINOPHIL # BLD AUTO: 0.2 K/UL
EOSINOPHIL # BLD AUTO: 0.3 K/UL
EOSINOPHIL # BLD AUTO: 0.4 K/UL
EOSINOPHIL NFR BLD: 0.3 %
EOSINOPHIL NFR BLD: 0.4 %
EOSINOPHIL NFR BLD: 0.4 %
EOSINOPHIL NFR BLD: 0.6 %
EOSINOPHIL NFR BLD: 1.2 %
EOSINOPHIL NFR BLD: 1.8 %
EOSINOPHIL NFR BLD: 1.9 %
EOSINOPHIL NFR BLD: 2.4 %
EOSINOPHIL NFR BLD: 2.7 %
EOSINOPHIL NFR BLD: 2.9 %
EOSINOPHIL NFR BLD: 3 %
EOSINOPHIL NFR BLD: 3.1 %
EOSINOPHIL NFR BLD: 3.1 %
EOSINOPHIL NFR BLD: 3.2 %
EOSINOPHIL NFR BLD: 3.4 %
EOSINOPHIL NFR BLD: 3.4 %
EOSINOPHIL NFR BLD: 3.5 %
EOSINOPHIL NFR BLD: 3.6 %
EOSINOPHIL NFR BLD: 4 %
ERYTHROCYTE [DISTWIDTH] IN BLOOD BY AUTOMATED COUNT: 15.4 %
ERYTHROCYTE [DISTWIDTH] IN BLOOD BY AUTOMATED COUNT: 15.7 %
ERYTHROCYTE [DISTWIDTH] IN BLOOD BY AUTOMATED COUNT: 15.8 %
ERYTHROCYTE [DISTWIDTH] IN BLOOD BY AUTOMATED COUNT: 15.8 %
ERYTHROCYTE [DISTWIDTH] IN BLOOD BY AUTOMATED COUNT: 15.9 %
ERYTHROCYTE [DISTWIDTH] IN BLOOD BY AUTOMATED COUNT: 16 %
ERYTHROCYTE [DISTWIDTH] IN BLOOD BY AUTOMATED COUNT: 16.1 %
ERYTHROCYTE [DISTWIDTH] IN BLOOD BY AUTOMATED COUNT: 16.3 %
ERYTHROCYTE [DISTWIDTH] IN BLOOD BY AUTOMATED COUNT: 16.5 %
ERYTHROCYTE [DISTWIDTH] IN BLOOD BY AUTOMATED COUNT: 16.7 %
ERYTHROCYTE [DISTWIDTH] IN BLOOD BY AUTOMATED COUNT: 16.8 %
ERYTHROCYTE [DISTWIDTH] IN BLOOD BY AUTOMATED COUNT: 16.9 %
ERYTHROCYTE [DISTWIDTH] IN BLOOD BY AUTOMATED COUNT: 17 %
ERYTHROCYTE [DISTWIDTH] IN BLOOD BY AUTOMATED COUNT: 17.1 %
ERYTHROCYTE [DISTWIDTH] IN BLOOD BY AUTOMATED COUNT: 17.2 %
ERYTHROCYTE [DISTWIDTH] IN BLOOD BY AUTOMATED COUNT: 17.2 %
ERYTHROCYTE [DISTWIDTH] IN BLOOD BY AUTOMATED COUNT: 17.3 %
ERYTHROCYTE [DISTWIDTH] IN BLOOD BY AUTOMATED COUNT: 17.4 %
ERYTHROCYTE [DISTWIDTH] IN BLOOD BY AUTOMATED COUNT: 17.5 %
EST. GFR  (AFRICAN AMERICAN): 3 ML/MIN/1.73 M^2
EST. GFR  (AFRICAN AMERICAN): 4 ML/MIN/1.73 M^2
EST. GFR  (AFRICAN AMERICAN): 4 ML/MIN/1.73 M^2
EST. GFR  (AFRICAN AMERICAN): 4.1 ML/MIN/1.73 M^2
EST. GFR  (AFRICAN AMERICAN): 4.2 ML/MIN/1.73 M^2
EST. GFR  (AFRICAN AMERICAN): 4.2 ML/MIN/1.73 M^2
EST. GFR  (AFRICAN AMERICAN): 4.4 ML/MIN/1.73 M^2
EST. GFR  (AFRICAN AMERICAN): 4.5 ML/MIN/1.73 M^2
EST. GFR  (NON AFRICAN AMERICAN): 2 ML/MIN/1.73 M^2
EST. GFR  (NON AFRICAN AMERICAN): 3 ML/MIN/1.73 M^2
EST. GFR  (NON AFRICAN AMERICAN): 3.5 ML/MIN/1.73 M^2
EST. GFR  (NON AFRICAN AMERICAN): 3.6 ML/MIN/1.73 M^2
EST. GFR  (NON AFRICAN AMERICAN): 3.7 ML/MIN/1.73 M^2
EST. GFR  (NON AFRICAN AMERICAN): 3.8 ML/MIN/1.73 M^2
EST. GFR  (NON AFRICAN AMERICAN): 3.9 ML/MIN/1.73 M^2
ESTIMATED AVG GLUCOSE: 134 MG/DL
FACT X PPP CHRO-ACNC: <0.1 IU/ML
FIBRINOGEN PPP-MCNC: 497 MG/DL
FIO2: 32
GLUCOSE SERPL-MCNC: 153 MG/DL
GLUCOSE SERPL-MCNC: 156 MG/DL
GLUCOSE SERPL-MCNC: 169 MG/DL
GLUCOSE SERPL-MCNC: 205 MG/DL
GLUCOSE SERPL-MCNC: 218 MG/DL
GLUCOSE SERPL-MCNC: 220 MG/DL
GLUCOSE SERPL-MCNC: 253 MG/DL
GLUCOSE SERPL-MCNC: 256 MG/DL
GLUCOSE SERPL-MCNC: 269 MG/DL
GLUCOSE SERPL-MCNC: 270 MG/DL
GLUCOSE SERPL-MCNC: 283 MG/DL
GLUCOSE SERPL-MCNC: 310 MG/DL
HBA1C MFR BLD HPLC: 6.3 %
HCO3 UR-SCNC: 20.5 MMOL/L (ref 24–28)
HCO3 UR-SCNC: 25.3 MMOL/L (ref 24–28)
HCT VFR BLD AUTO: 18.7 %
HCT VFR BLD AUTO: 19.1 %
HCT VFR BLD AUTO: 20.4 %
HCT VFR BLD AUTO: 21.3 %
HCT VFR BLD AUTO: 21.5 %
HCT VFR BLD AUTO: 21.6 %
HCT VFR BLD AUTO: 21.7 %
HCT VFR BLD AUTO: 22.5 %
HCT VFR BLD AUTO: 22.9 %
HCT VFR BLD AUTO: 22.9 %
HCT VFR BLD AUTO: 23 %
HCT VFR BLD AUTO: 23.1 %
HCT VFR BLD AUTO: 23.3 %
HCT VFR BLD AUTO: 23.4 %
HCT VFR BLD AUTO: 24.1 %
HCT VFR BLD AUTO: 24.2 %
HCT VFR BLD AUTO: 25.3 %
HCT VFR BLD AUTO: 26 %
HGB BLD-MCNC: 6.2 G/DL
HGB BLD-MCNC: 6.3 G/DL
HGB BLD-MCNC: 6.6 G/DL
HGB BLD-MCNC: 6.9 G/DL
HGB BLD-MCNC: 7 G/DL
HGB BLD-MCNC: 7.1 G/DL
HGB BLD-MCNC: 7.3 G/DL
HGB BLD-MCNC: 7.3 G/DL
HGB BLD-MCNC: 7.4 G/DL
HGB BLD-MCNC: 7.5 G/DL
HGB BLD-MCNC: 7.5 G/DL
HGB BLD-MCNC: 7.6 G/DL
HGB BLD-MCNC: 7.8 G/DL
HGB BLD-MCNC: 7.9 G/DL
HGB BLD-MCNC: 8.1 G/DL
HGB BLD-MCNC: 8.2 G/DL
HGB BLD-MCNC: 8.3 G/DL
HGB BLD-MCNC: 8.4 G/DL
HGB BLD-MCNC: 8.5 G/DL
IMM GRANULOCYTES # BLD AUTO: 0.03 K/UL
IMM GRANULOCYTES # BLD AUTO: 0.04 K/UL
IMM GRANULOCYTES # BLD AUTO: 0.05 K/UL
IMM GRANULOCYTES # BLD AUTO: 0.05 K/UL
IMM GRANULOCYTES # BLD AUTO: 0.06 K/UL
IMM GRANULOCYTES # BLD AUTO: 0.07 K/UL
IMM GRANULOCYTES # BLD AUTO: 0.07 K/UL
IMM GRANULOCYTES # BLD AUTO: 0.08 K/UL
IMM GRANULOCYTES # BLD AUTO: 0.14 K/UL
IMM GRANULOCYTES # BLD AUTO: 0.52 K/UL
IMM GRANULOCYTES NFR BLD AUTO: 0.4 %
IMM GRANULOCYTES NFR BLD AUTO: 0.4 %
IMM GRANULOCYTES NFR BLD AUTO: 0.5 %
IMM GRANULOCYTES NFR BLD AUTO: 0.5 %
IMM GRANULOCYTES NFR BLD AUTO: 0.6 %
IMM GRANULOCYTES NFR BLD AUTO: 0.7 %
IMM GRANULOCYTES NFR BLD AUTO: 0.7 %
IMM GRANULOCYTES NFR BLD AUTO: 0.8 %
IMM GRANULOCYTES NFR BLD AUTO: 0.9 %
IMM GRANULOCYTES NFR BLD AUTO: 1.7 %
INR PPP: 1.1
IRON SERPL-MCNC: 19 UG/DL
LACTATE SERPL-SCNC: 2 MMOL/L
LYMPHOCYTES # BLD AUTO: 1.2 K/UL
LYMPHOCYTES # BLD AUTO: 1.3 K/UL
LYMPHOCYTES # BLD AUTO: 1.5 K/UL
LYMPHOCYTES # BLD AUTO: 1.6 K/UL
LYMPHOCYTES # BLD AUTO: 1.6 K/UL
LYMPHOCYTES # BLD AUTO: 1.7 K/UL
LYMPHOCYTES # BLD AUTO: 1.8 K/UL
LYMPHOCYTES # BLD AUTO: 1.9 K/UL
LYMPHOCYTES # BLD AUTO: 2 K/UL
LYMPHOCYTES # BLD AUTO: 2.1 K/UL
LYMPHOCYTES # BLD AUTO: 2.3 K/UL
LYMPHOCYTES # BLD AUTO: 2.4 K/UL
LYMPHOCYTES # BLD AUTO: 2.7 K/UL
LYMPHOCYTES # BLD AUTO: 2.9 K/UL
LYMPHOCYTES # BLD AUTO: 3.3 K/UL
LYMPHOCYTES # BLD AUTO: 3.8 K/UL
LYMPHOCYTES NFR BLD: 10.6 %
LYMPHOCYTES NFR BLD: 13.2 %
LYMPHOCYTES NFR BLD: 14.3 %
LYMPHOCYTES NFR BLD: 14.7 %
LYMPHOCYTES NFR BLD: 14.9 %
LYMPHOCYTES NFR BLD: 16.2 %
LYMPHOCYTES NFR BLD: 17.7 %
LYMPHOCYTES NFR BLD: 18.1 %
LYMPHOCYTES NFR BLD: 18.5 %
LYMPHOCYTES NFR BLD: 19.4 %
LYMPHOCYTES NFR BLD: 20 %
LYMPHOCYTES NFR BLD: 20 %
LYMPHOCYTES NFR BLD: 20.2 %
LYMPHOCYTES NFR BLD: 20.7 %
LYMPHOCYTES NFR BLD: 22.2 %
LYMPHOCYTES NFR BLD: 24.3 %
LYMPHOCYTES NFR BLD: 26.8 %
LYMPHOCYTES NFR BLD: 26.9 %
LYMPHOCYTES NFR BLD: 28.7 %
LYMPHOCYTES NFR BLD: 30.5 %
LYMPHOCYTES NFR BLD: 5.6 %
MAGNESIUM SERPL-MCNC: 1.6 MG/DL
MAGNESIUM SERPL-MCNC: 1.7 MG/DL
MAGNESIUM SERPL-MCNC: 1.8 MG/DL
MAGNESIUM SERPL-MCNC: 1.9 MG/DL
MAGNESIUM SERPL-MCNC: 2.3 MG/DL
MCH RBC QN AUTO: 29.6 PG
MCH RBC QN AUTO: 29.7 PG
MCH RBC QN AUTO: 29.9 PG
MCH RBC QN AUTO: 30.1 PG
MCH RBC QN AUTO: 30.1 PG
MCH RBC QN AUTO: 30.2 PG
MCH RBC QN AUTO: 30.2 PG
MCH RBC QN AUTO: 30.4 PG
MCH RBC QN AUTO: 30.4 PG
MCH RBC QN AUTO: 30.5 PG
MCH RBC QN AUTO: 30.7 PG
MCH RBC QN AUTO: 31.6 PG
MCH RBC QN AUTO: 31.7 PG
MCH RBC QN AUTO: 31.8 PG
MCH RBC QN AUTO: 31.9 PG
MCH RBC QN AUTO: 32 PG
MCH RBC QN AUTO: 32 PG
MCH RBC QN AUTO: 33.3 PG
MCH RBC QN AUTO: 33.5 PG
MCHC RBC AUTO-ENTMCNC: 32.3 G/DL
MCHC RBC AUTO-ENTMCNC: 32.4 G/DL
MCHC RBC AUTO-ENTMCNC: 32.4 G/DL
MCHC RBC AUTO-ENTMCNC: 32.5 G/DL
MCHC RBC AUTO-ENTMCNC: 32.5 G/DL
MCHC RBC AUTO-ENTMCNC: 32.6 G/DL
MCHC RBC AUTO-ENTMCNC: 32.7 G/DL
MCHC RBC AUTO-ENTMCNC: 33 G/DL
MCHC RBC AUTO-ENTMCNC: 33 G/DL
MCHC RBC AUTO-ENTMCNC: 33.2 G/DL
MCHC RBC AUTO-ENTMCNC: 33.3 G/DL
MCHC RBC AUTO-ENTMCNC: 33.7 G/DL
MCHC RBC AUTO-ENTMCNC: 33.8 G/DL
MCHC RBC AUTO-ENTMCNC: 33.8 G/DL
MCHC RBC AUTO-ENTMCNC: 34 G/DL
MCHC RBC AUTO-ENTMCNC: 34 G/DL
MCHC RBC AUTO-ENTMCNC: 34.1 G/DL
MCHC RBC AUTO-ENTMCNC: 34.1 G/DL
MCHC RBC AUTO-ENTMCNC: 34.3 G/DL
MCHC RBC AUTO-ENTMCNC: 34.4 G/DL
MCHC RBC AUTO-ENTMCNC: 34.8 G/DL
MCV RBC AUTO: 86 FL
MCV RBC AUTO: 88 FL
MCV RBC AUTO: 89 FL
MCV RBC AUTO: 90 FL
MCV RBC AUTO: 90 FL
MCV RBC AUTO: 91 FL
MCV RBC AUTO: 91 FL
MCV RBC AUTO: 93 FL
MCV RBC AUTO: 96 FL
MCV RBC AUTO: 96 FL
MCV RBC AUTO: 98 FL
MCV RBC AUTO: 99 FL
MCV RBC AUTO: 99 FL
MODE: ABNORMAL
MONOCYTES # BLD AUTO: 0.4 K/UL
MONOCYTES # BLD AUTO: 0.4 K/UL
MONOCYTES # BLD AUTO: 0.5 K/UL
MONOCYTES # BLD AUTO: 0.5 K/UL
MONOCYTES # BLD AUTO: 0.6 K/UL
MONOCYTES # BLD AUTO: 0.7 K/UL
MONOCYTES # BLD AUTO: 0.8 K/UL
MONOCYTES # BLD AUTO: 0.8 K/UL
MONOCYTES # BLD AUTO: 1.2 K/UL
MONOCYTES NFR BLD: 3.7 %
MONOCYTES NFR BLD: 3.8 %
MONOCYTES NFR BLD: 4.8 %
MONOCYTES NFR BLD: 5.3 %
MONOCYTES NFR BLD: 5.4 %
MONOCYTES NFR BLD: 5.6 %
MONOCYTES NFR BLD: 5.6 %
MONOCYTES NFR BLD: 5.9 %
MONOCYTES NFR BLD: 5.9 %
MONOCYTES NFR BLD: 6 %
MONOCYTES NFR BLD: 6.1 %
MONOCYTES NFR BLD: 6.1 %
MONOCYTES NFR BLD: 6.2 %
MONOCYTES NFR BLD: 6.3 %
MONOCYTES NFR BLD: 6.9 %
MONOCYTES NFR BLD: 6.9 %
MONOCYTES NFR BLD: 7.1 %
MONOCYTES NFR BLD: 7.3 %
MONOCYTES NFR BLD: 8.6 %
NEUTROPHILS # BLD AUTO: 12 K/UL
NEUTROPHILS # BLD AUTO: 27.6 K/UL
NEUTROPHILS # BLD AUTO: 4.7 K/UL
NEUTROPHILS # BLD AUTO: 6 K/UL
NEUTROPHILS # BLD AUTO: 6.1 K/UL
NEUTROPHILS # BLD AUTO: 6.1 K/UL
NEUTROPHILS # BLD AUTO: 6.3 K/UL
NEUTROPHILS # BLD AUTO: 6.5 K/UL
NEUTROPHILS # BLD AUTO: 6.5 K/UL
NEUTROPHILS # BLD AUTO: 6.6 K/UL
NEUTROPHILS # BLD AUTO: 6.6 K/UL
NEUTROPHILS # BLD AUTO: 7.1 K/UL
NEUTROPHILS # BLD AUTO: 7.2 K/UL
NEUTROPHILS # BLD AUTO: 7.3 K/UL
NEUTROPHILS # BLD AUTO: 7.6 K/UL
NEUTROPHILS # BLD AUTO: 7.7 K/UL
NEUTROPHILS # BLD AUTO: 7.7 K/UL
NEUTROPHILS # BLD AUTO: 8.5 K/UL
NEUTROPHILS # BLD AUTO: 9.2 K/UL
NEUTROPHILS # BLD AUTO: 9.5 K/UL
NEUTROPHILS # BLD AUTO: 9.8 K/UL
NEUTROPHILS NFR BLD: 61 %
NEUTROPHILS NFR BLD: 61.6 %
NEUTROPHILS NFR BLD: 61.9 %
NEUTROPHILS NFR BLD: 64 %
NEUTROPHILS NFR BLD: 66 %
NEUTROPHILS NFR BLD: 67.1 %
NEUTROPHILS NFR BLD: 67.5 %
NEUTROPHILS NFR BLD: 69.6 %
NEUTROPHILS NFR BLD: 70.3 %
NEUTROPHILS NFR BLD: 70.6 %
NEUTROPHILS NFR BLD: 70.7 %
NEUTROPHILS NFR BLD: 71.3 %
NEUTROPHILS NFR BLD: 71.4 %
NEUTROPHILS NFR BLD: 74.5 %
NEUTROPHILS NFR BLD: 75.2 %
NEUTROPHILS NFR BLD: 75.9 %
NEUTROPHILS NFR BLD: 76.6 %
NEUTROPHILS NFR BLD: 79.8 %
NEUTROPHILS NFR BLD: 80.8 %
NEUTROPHILS NFR BLD: 82.9 %
NEUTROPHILS NFR BLD: 88.3 %
NRBC BLD-RTO: 0 /100 WBC
NUM UNITS TRANS FFP: NORMAL
PCO2 BLDA: 36.7 MMHG (ref 35–45)
PCO2 BLDA: 38.3 MMHG (ref 35–45)
PH SMN: 7.34 [PH] (ref 7.35–7.45)
PH SMN: 7.45 [PH] (ref 7.35–7.45)
PHOSPHATE SERPL-MCNC: 10 MG/DL
PHOSPHATE SERPL-MCNC: 5.4 MG/DL
PHOSPHATE SERPL-MCNC: 6.1 MG/DL
PHOSPHATE SERPL-MCNC: 6.2 MG/DL
PHOSPHATE SERPL-MCNC: 6.2 MG/DL
PHOSPHATE SERPL-MCNC: 6.4 MG/DL
PHOSPHATE SERPL-MCNC: 6.9 MG/DL
PHOSPHATE SERPL-MCNC: 7.6 MG/DL
PHOSPHATE SERPL-MCNC: 8.4 MG/DL
PHOSPHATE SERPL-MCNC: 8.6 MG/DL
PLATELET # BLD AUTO: 298 K/UL
PLATELET # BLD AUTO: 314 K/UL
PLATELET # BLD AUTO: 316 K/UL
PLATELET # BLD AUTO: 318 K/UL
PLATELET # BLD AUTO: 323 K/UL
PLATELET # BLD AUTO: 326 K/UL
PLATELET # BLD AUTO: 328 K/UL
PLATELET # BLD AUTO: 330 K/UL
PLATELET # BLD AUTO: 341 K/UL
PLATELET # BLD AUTO: 346 K/UL
PLATELET # BLD AUTO: 347 K/UL
PLATELET # BLD AUTO: 348 K/UL
PLATELET # BLD AUTO: 359 K/UL
PLATELET # BLD AUTO: 368 K/UL
PLATELET # BLD AUTO: 382 K/UL
PLATELET # BLD AUTO: 389 K/UL
PLATELET # BLD AUTO: 394 K/UL
PLATELET # BLD AUTO: 402 K/UL
PLATELET # BLD AUTO: 417 K/UL
PLATELET # BLD AUTO: 468 K/UL
PLATELET # BLD AUTO: 472 K/UL
PLATELET BLD QL SMEAR: ABNORMAL
PLATELET BLD QL SMEAR: ABNORMAL
PMV BLD AUTO: 10 FL
PMV BLD AUTO: 10 FL
PMV BLD AUTO: 10.1 FL
PMV BLD AUTO: 10.3 FL
PMV BLD AUTO: 10.3 FL
PMV BLD AUTO: 10.4 FL
PMV BLD AUTO: 10.4 FL
PMV BLD AUTO: 10.5 FL
PMV BLD AUTO: 10.9 FL
PMV BLD AUTO: 9.3 FL
PMV BLD AUTO: 9.5 FL
PMV BLD AUTO: 9.6 FL
PMV BLD AUTO: 9.7 FL
PMV BLD AUTO: 9.9 FL
PO2 BLDA: 138 MMHG (ref 80–100)
PO2 BLDA: 52 MMHG (ref 80–100)
POC BE: -5 MMOL/L
POC BE: 1 MMOL/L
POC SATURATED O2: 85 % (ref 95–100)
POC SATURATED O2: 99 % (ref 95–100)
POC TCO2: 26 MMOL/L (ref 23–27)
POCT GLUCOSE: 166 MG/DL (ref 70–110)
POCT GLUCOSE: 172 MG/DL (ref 70–110)
POCT GLUCOSE: 173 MG/DL (ref 70–110)
POCT GLUCOSE: 177 MG/DL (ref 70–110)
POCT GLUCOSE: 178 MG/DL (ref 70–110)
POCT GLUCOSE: 179 MG/DL (ref 70–110)
POCT GLUCOSE: 182 MG/DL (ref 70–110)
POCT GLUCOSE: 187 MG/DL (ref 70–110)
POCT GLUCOSE: 189 MG/DL (ref 70–110)
POCT GLUCOSE: 191 MG/DL (ref 70–110)
POCT GLUCOSE: 193 MG/DL (ref 70–110)
POCT GLUCOSE: 196 MG/DL (ref 70–110)
POCT GLUCOSE: 197 MG/DL (ref 70–110)
POCT GLUCOSE: 200 MG/DL (ref 70–110)
POCT GLUCOSE: 204 MG/DL (ref 70–110)
POCT GLUCOSE: 210 MG/DL (ref 70–110)
POCT GLUCOSE: 214 MG/DL (ref 70–110)
POCT GLUCOSE: 215 MG/DL (ref 70–110)
POCT GLUCOSE: 216 MG/DL (ref 70–110)
POCT GLUCOSE: 221 MG/DL (ref 70–110)
POCT GLUCOSE: 221 MG/DL (ref 70–110)
POCT GLUCOSE: 226 MG/DL (ref 70–110)
POCT GLUCOSE: 229 MG/DL (ref 70–110)
POCT GLUCOSE: 230 MG/DL (ref 70–110)
POCT GLUCOSE: 232 MG/DL (ref 70–110)
POCT GLUCOSE: 235 MG/DL (ref 70–110)
POCT GLUCOSE: 238 MG/DL (ref 70–110)
POCT GLUCOSE: 244 MG/DL (ref 70–110)
POCT GLUCOSE: 249 MG/DL (ref 70–110)
POCT GLUCOSE: 252 MG/DL (ref 70–110)
POCT GLUCOSE: 269 MG/DL (ref 70–110)
POCT GLUCOSE: 273 MG/DL (ref 70–110)
POCT GLUCOSE: 281 MG/DL (ref 70–110)
POCT GLUCOSE: 285 MG/DL (ref 70–110)
POCT GLUCOSE: 291 MG/DL (ref 70–110)
POCT GLUCOSE: 301 MG/DL (ref 70–110)
POCT GLUCOSE: 306 MG/DL (ref 70–110)
POTASSIUM SERPL-SCNC: 3.2 MMOL/L
POTASSIUM SERPL-SCNC: 3.3 MMOL/L
POTASSIUM SERPL-SCNC: 3.5 MMOL/L
POTASSIUM SERPL-SCNC: 3.8 MMOL/L
POTASSIUM SERPL-SCNC: 3.9 MMOL/L
POTASSIUM SERPL-SCNC: 3.9 MMOL/L
POTASSIUM SERPL-SCNC: 4.1 MMOL/L
POTASSIUM SERPL-SCNC: 4.2 MMOL/L
POTASSIUM SERPL-SCNC: 4.8 MMOL/L
POTASSIUM SERPL-SCNC: 5 MMOL/L
PROT SERPL-MCNC: 5.3 G/DL
PROT SERPL-MCNC: 6.2 G/DL
PROT SERPL-MCNC: 6.6 G/DL
PROT SERPL-MCNC: 6.6 G/DL
PROT SERPL-MCNC: 6.7 G/DL
PROT SERPL-MCNC: 7 G/DL
PROT SERPL-MCNC: 7.3 G/DL
PROT SERPL-MCNC: 7.3 G/DL
PROT SERPL-MCNC: 7.4 G/DL
PROT SERPL-MCNC: 7.8 G/DL
PROT SERPL-MCNC: 7.9 G/DL
PROTHROMBIN TIME: 11 SEC
RBC # BLD AUTO: 1.89 M/UL
RBC # BLD AUTO: 1.95 M/UL
RBC # BLD AUTO: 2.17 M/UL
RBC # BLD AUTO: 2.19 M/UL
RBC # BLD AUTO: 2.19 M/UL
RBC # BLD AUTO: 2.21 M/UL
RBC # BLD AUTO: 2.25 M/UL
RBC # BLD AUTO: 2.32 M/UL
RBC # BLD AUTO: 2.36 M/UL
RBC # BLD AUTO: 2.4 M/UL
RBC # BLD AUTO: 2.4 M/UL
RBC # BLD AUTO: 2.44 M/UL
RBC # BLD AUTO: 2.45 M/UL
RBC # BLD AUTO: 2.48 M/UL
RBC # BLD AUTO: 2.57 M/UL
RBC # BLD AUTO: 2.64 M/UL
RBC # BLD AUTO: 2.66 M/UL
RBC # BLD AUTO: 2.66 M/UL
RBC # BLD AUTO: 2.69 M/UL
RBC # BLD AUTO: 2.79 M/UL
RBC # BLD AUTO: 2.8 M/UL
SAMPLE: ABNORMAL
SAMPLE: ABNORMAL
SATURATED IRON: 10 %
SITE: ABNORMAL
SITE: ABNORMAL
SODIUM SERPL-SCNC: 128 MMOL/L
SODIUM SERPL-SCNC: 129 MMOL/L
SODIUM SERPL-SCNC: 130 MMOL/L
SODIUM SERPL-SCNC: 130 MMOL/L
SODIUM SERPL-SCNC: 131 MMOL/L
SODIUM SERPL-SCNC: 131 MMOL/L
SODIUM SERPL-SCNC: 132 MMOL/L
SODIUM SERPL-SCNC: 133 MMOL/L
SODIUM SERPL-SCNC: 133 MMOL/L
SODIUM SERPL-SCNC: 134 MMOL/L
SODIUM SERPL-SCNC: 135 MMOL/L
SODIUM SERPL-SCNC: 136 MMOL/L
SP02: 90
TOTAL IRON BINDING CAPACITY: 182 UG/DL
TRANS ERYTHROCYTES VOL PATIENT: NORMAL ML
TRANS PLATPHERESIS VOL PATIENT: NORMAL ML
TRANSFERRIN SERPL-MCNC: 123 MG/DL
WBC # BLD AUTO: 10.01 K/UL
WBC # BLD AUTO: 10.08 K/UL
WBC # BLD AUTO: 10.09 K/UL
WBC # BLD AUTO: 10.63 K/UL
WBC # BLD AUTO: 10.94 K/UL
WBC # BLD AUTO: 11.59 K/UL
WBC # BLD AUTO: 11.73 K/UL
WBC # BLD AUTO: 11.85 K/UL
WBC # BLD AUTO: 11.94 K/UL
WBC # BLD AUTO: 12.12 K/UL
WBC # BLD AUTO: 12.52 K/UL
WBC # BLD AUTO: 15.07 K/UL
WBC # BLD AUTO: 31.2 K/UL
WBC # BLD AUTO: 7.08 K/UL
WBC # BLD AUTO: 8.16 K/UL
WBC # BLD AUTO: 8.6 K/UL
WBC # BLD AUTO: 8.86 K/UL
WBC # BLD AUTO: 9 K/UL
WBC # BLD AUTO: 9.28 K/UL
WBC # BLD AUTO: 9.63 K/UL
WBC # BLD AUTO: 9.77 K/UL

## 2018-01-01 PROCEDURE — 86850 RBC ANTIBODY SCREEN: CPT | Mod: NTX

## 2018-01-01 PROCEDURE — 85025 COMPLETE CBC W/AUTO DIFF WBC: CPT | Mod: NTX

## 2018-01-01 PROCEDURE — 37000008 HC ANESTHESIA 1ST 15 MINUTES: Mod: NTX | Performed by: INTERNAL MEDICINE

## 2018-01-01 PROCEDURE — 11000001 HC ACUTE MED/SURG PRIVATE ROOM: Mod: NTX

## 2018-01-01 PROCEDURE — 97530 THERAPEUTIC ACTIVITIES: CPT | Mod: NTX

## 2018-01-01 PROCEDURE — 25000003 PHARM REV CODE 250: Mod: NTX | Performed by: NURSE PRACTITIONER

## 2018-01-01 PROCEDURE — 83540 ASSAY OF IRON: CPT | Mod: NTX

## 2018-01-01 PROCEDURE — P9021 RED BLOOD CELLS UNIT: HCPCS | Mod: NTX

## 2018-01-01 PROCEDURE — 0QS704Z REPOSITION LEFT UPPER FEMUR WITH INTERNAL FIXATION DEVICE, OPEN APPROACH: ICD-10-PCS | Performed by: ORTHOPAEDIC SURGERY

## 2018-01-01 PROCEDURE — G8987 SELF CARE CURRENT STATUS: HCPCS | Mod: CK,NTX

## 2018-01-01 PROCEDURE — 94761 N-INVAS EAR/PLS OXIMETRY MLT: CPT | Mod: NTX

## 2018-01-01 PROCEDURE — 27201040 HC RC 50 FILTER: Mod: NTX

## 2018-01-01 PROCEDURE — 90945 DIALYSIS ONE EVALUATION: CPT | Mod: NTX

## 2018-01-01 PROCEDURE — 97110 THERAPEUTIC EXERCISES: CPT | Mod: NTX

## 2018-01-01 PROCEDURE — 99223 1ST HOSP IP/OBS HIGH 75: CPT | Mod: NTX,,, | Performed by: NURSE PRACTITIONER

## 2018-01-01 PROCEDURE — 84100 ASSAY OF PHOSPHORUS: CPT | Mod: NTX

## 2018-01-01 PROCEDURE — 25500020 PHARM REV CODE 255: Mod: NTX | Performed by: INTERNAL MEDICINE

## 2018-01-01 PROCEDURE — 25000003 PHARM REV CODE 250: Mod: NTX | Performed by: PHYSICIAN ASSISTANT

## 2018-01-01 PROCEDURE — 83735 ASSAY OF MAGNESIUM: CPT | Mod: NTX

## 2018-01-01 PROCEDURE — 85730 THROMBOPLASTIN TIME PARTIAL: CPT | Mod: NTX

## 2018-01-01 PROCEDURE — 37799 UNLISTED PX VASCULAR SURGERY: CPT | Mod: NTX

## 2018-01-01 PROCEDURE — 25000003 PHARM REV CODE 250: Mod: NTX | Performed by: STUDENT IN AN ORGANIZED HEALTH CARE EDUCATION/TRAINING PROGRAM

## 2018-01-01 PROCEDURE — 45382 COLONOSCOPY W/CONTROL BLEED: CPT | Mod: NTX | Performed by: INTERNAL MEDICINE

## 2018-01-01 PROCEDURE — 80053 COMPREHEN METABOLIC PANEL: CPT | Mod: NTX

## 2018-01-01 PROCEDURE — 97116 GAIT TRAINING THERAPY: CPT | Mod: NTX

## 2018-01-01 PROCEDURE — 99233 SBSQ HOSP IP/OBS HIGH 50: CPT | Mod: NTX,,, | Performed by: NURSE PRACTITIONER

## 2018-01-01 PROCEDURE — 96374 THER/PROPH/DIAG INJ IV PUSH: CPT | Mod: NTX

## 2018-01-01 PROCEDURE — 36415 COLL VENOUS BLD VENIPUNCTURE: CPT | Mod: NTX

## 2018-01-01 PROCEDURE — C9113 INJ PANTOPRAZOLE SODIUM, VIA: HCPCS | Mod: NTX | Performed by: NURSE PRACTITIONER

## 2018-01-01 PROCEDURE — 27200997: Mod: NTX | Performed by: INTERNAL MEDICINE

## 2018-01-01 PROCEDURE — 94799 UNLISTED PULMONARY SVC/PX: CPT | Mod: NTX

## 2018-01-01 PROCEDURE — 27201036 HC POLYLOOP LIGATING DEVICE: Mod: NTX | Performed by: INTERNAL MEDICINE

## 2018-01-01 PROCEDURE — 99900035 HC TECH TIME PER 15 MIN (STAT): Mod: NTX

## 2018-01-01 PROCEDURE — 25000003 PHARM REV CODE 250: Mod: NTX | Performed by: EMERGENCY MEDICINE

## 2018-01-01 PROCEDURE — 25000003 PHARM REV CODE 250: Mod: NTX | Performed by: HOSPITALIST

## 2018-01-01 PROCEDURE — 86920 COMPATIBILITY TEST SPIN: CPT | Mod: NTX

## 2018-01-01 PROCEDURE — 27000221 HC OXYGEN, UP TO 24 HOURS: Mod: NTX

## 2018-01-01 PROCEDURE — 93010 ELECTROCARDIOGRAM REPORT: CPT | Mod: NTX,,, | Performed by: INTERNAL MEDICINE

## 2018-01-01 PROCEDURE — G8978 MOBILITY CURRENT STATUS: HCPCS | Mod: CK,NTX

## 2018-01-01 PROCEDURE — 25000003 PHARM REV CODE 250: Mod: NTX | Performed by: ORTHOPAEDIC SURGERY

## 2018-01-01 PROCEDURE — 90945 DIALYSIS ONE EVALUATION: CPT | Mod: ,,, | Performed by: INTERNAL MEDICINE

## 2018-01-01 PROCEDURE — 63600175 PHARM REV CODE 636 W HCPCS: Mod: NTX | Performed by: INTERNAL MEDICINE

## 2018-01-01 PROCEDURE — 99233 SBSQ HOSP IP/OBS HIGH 50: CPT | Mod: NTX,,, | Performed by: HOSPITALIST

## 2018-01-01 PROCEDURE — C9113 INJ PANTOPRAZOLE SODIUM, VIA: HCPCS | Mod: NTX | Performed by: STUDENT IN AN ORGANIZED HEALTH CARE EDUCATION/TRAINING PROGRAM

## 2018-01-01 PROCEDURE — 88305 TISSUE EXAM BY PATHOLOGIST: CPT | Mod: 26,NTX,, | Performed by: PATHOLOGY

## 2018-01-01 PROCEDURE — 82803 BLOOD GASES ANY COMBINATION: CPT | Mod: NTX

## 2018-01-01 PROCEDURE — 36430 TRANSFUSION BLD/BLD COMPNT: CPT | Mod: NTX

## 2018-01-01 PROCEDURE — 85025 COMPLETE CBC W/AUTO DIFF WBC: CPT | Mod: 91,NTX

## 2018-01-01 PROCEDURE — 99239 HOSP IP/OBS DSCHRG MGMT >30: CPT | Mod: NTX,,, | Performed by: HOSPITALIST

## 2018-01-01 PROCEDURE — 99232 SBSQ HOSP IP/OBS MODERATE 35: CPT | Mod: NTX,,, | Performed by: HOSPITALIST

## 2018-01-01 PROCEDURE — 37000009 HC ANESTHESIA EA ADD 15 MINS: Mod: NTX | Performed by: ORTHOPAEDIC SURGERY

## 2018-01-01 PROCEDURE — 63600175 PHARM REV CODE 636 W HCPCS: Mod: NTX | Performed by: HOSPITALIST

## 2018-01-01 PROCEDURE — 85610 PROTHROMBIN TIME: CPT | Mod: NTX

## 2018-01-01 PROCEDURE — 37000008 HC ANESTHESIA 1ST 15 MINUTES: Mod: NTX | Performed by: ORTHOPAEDIC SURGERY

## 2018-01-01 PROCEDURE — 0DJD8ZZ INSPECTION OF LOWER INTESTINAL TRACT, VIA NATURAL OR ARTIFICIAL OPENING ENDOSCOPIC: ICD-10-PCS | Performed by: COLON & RECTAL SURGERY

## 2018-01-01 PROCEDURE — C1713 ANCHOR/SCREW BN/BN,TIS/BN: HCPCS | Mod: NTX | Performed by: ORTHOPAEDIC SURGERY

## 2018-01-01 PROCEDURE — D9220A PRA ANESTHESIA: Mod: CRNA,NTX,, | Performed by: NURSE ANESTHETIST, CERTIFIED REGISTERED

## 2018-01-01 PROCEDURE — 63600175 PHARM REV CODE 636 W HCPCS: Mod: NTX | Performed by: NURSE PRACTITIONER

## 2018-01-01 PROCEDURE — 37000009 HC ANESTHESIA EA ADD 15 MINS: Mod: NTX | Performed by: INTERNAL MEDICINE

## 2018-01-01 PROCEDURE — 99233 SBSQ HOSP IP/OBS HIGH 50: CPT | Mod: NTX,,, | Performed by: PHYSICIAN ASSISTANT

## 2018-01-01 PROCEDURE — 63600175 PHARM REV CODE 636 W HCPCS: Mod: NTX | Performed by: EMERGENCY MEDICINE

## 2018-01-01 PROCEDURE — 63600175 PHARM REV CODE 636 W HCPCS: Mod: NTX | Performed by: ORTHOPAEDIC SURGERY

## 2018-01-01 PROCEDURE — 99285 EMERGENCY DEPT VISIT HI MDM: CPT | Mod: 25,NTX

## 2018-01-01 PROCEDURE — 85520 HEPARIN ASSAY: CPT | Mod: NTX

## 2018-01-01 PROCEDURE — 0DBH8ZX EXCISION OF CECUM, VIA NATURAL OR ARTIFICIAL OPENING ENDOSCOPIC, DIAGNOSTIC: ICD-10-PCS | Performed by: INTERNAL MEDICINE

## 2018-01-01 PROCEDURE — 45385 COLONOSCOPY W/LESION REMOVAL: CPT | Mod: NTX | Performed by: INTERNAL MEDICINE

## 2018-01-01 PROCEDURE — 25000003 PHARM REV CODE 250: Mod: NTX | Performed by: INTERNAL MEDICINE

## 2018-01-01 PROCEDURE — 63600175 PHARM REV CODE 636 W HCPCS: Mod: NTX | Performed by: PHYSICIAN ASSISTANT

## 2018-01-01 PROCEDURE — 99232 SBSQ HOSP IP/OBS MODERATE 35: CPT | Mod: NTX,,, | Performed by: INTERNAL MEDICINE

## 2018-01-01 PROCEDURE — 0W3P8ZZ CONTROL BLEEDING IN GASTROINTESTINAL TRACT, VIA NATURAL OR ARTIFICIAL OPENING ENDOSCOPIC: ICD-10-PCS | Performed by: INTERNAL MEDICINE

## 2018-01-01 PROCEDURE — P9017 PLASMA 1 DONOR FRZ W/IN 8 HR: HCPCS | Mod: NTX

## 2018-01-01 PROCEDURE — P9035 PLATELET PHERES LEUKOREDUCED: HCPCS | Mod: NTX

## 2018-01-01 PROCEDURE — 99233 SBSQ HOSP IP/OBS HIGH 50: CPT | Mod: ,,, | Performed by: INTERNAL MEDICINE

## 2018-01-01 PROCEDURE — 20600001 HC STEP DOWN PRIVATE ROOM: Mod: NTX

## 2018-01-01 PROCEDURE — 97161 PT EVAL LOW COMPLEX 20 MIN: CPT | Mod: NTX

## 2018-01-01 PROCEDURE — 63600175 PHARM REV CODE 636 W HCPCS: Mod: NTX | Performed by: NURSE ANESTHETIST, CERTIFIED REGISTERED

## 2018-01-01 PROCEDURE — 99291 CRITICAL CARE FIRST HOUR: CPT | Mod: 25,NTX,, | Performed by: NURSE PRACTITIONER

## 2018-01-01 PROCEDURE — 83036 HEMOGLOBIN GLYCOSYLATED A1C: CPT | Mod: NTX

## 2018-01-01 PROCEDURE — 99291 CRITICAL CARE FIRST HOUR: CPT | Mod: 25,NTX

## 2018-01-01 PROCEDURE — 63600175 PHARM REV CODE 636 W HCPCS: Mod: NTX | Performed by: STUDENT IN AN ORGANIZED HEALTH CARE EDUCATION/TRAINING PROGRAM

## 2018-01-01 PROCEDURE — 99291 CRITICAL CARE FIRST HOUR: CPT | Mod: ,,, | Performed by: EMERGENCY MEDICINE

## 2018-01-01 PROCEDURE — 25000003 PHARM REV CODE 250: Mod: NTX | Performed by: NURSE ANESTHETIST, CERTIFIED REGISTERED

## 2018-01-01 PROCEDURE — 97535 SELF CARE MNGMENT TRAINING: CPT | Mod: NTX

## 2018-01-01 PROCEDURE — 99222 1ST HOSP IP/OBS MODERATE 55: CPT | Mod: NTX,,, | Performed by: INTERNAL MEDICINE

## 2018-01-01 PROCEDURE — 80048 BASIC METABOLIC PNL TOTAL CA: CPT | Mod: NTX

## 2018-01-01 PROCEDURE — 71000033 HC RECOVERY, INTIAL HOUR: Mod: NTX | Performed by: ORTHOPAEDIC SURGERY

## 2018-01-01 PROCEDURE — 36000711: Mod: NTX | Performed by: ORTHOPAEDIC SURGERY

## 2018-01-01 PROCEDURE — D9220A PRA ANESTHESIA: Mod: ANES,NTX,, | Performed by: ANESTHESIOLOGY

## 2018-01-01 PROCEDURE — 36620 INSERTION CATHETER ARTERY: CPT | Mod: NTX,,, | Performed by: NURSE PRACTITIONER

## 2018-01-01 PROCEDURE — 86901 BLOOD TYPING SEROLOGIC RH(D): CPT | Mod: NTX

## 2018-01-01 PROCEDURE — 45385 COLONOSCOPY W/LESION REMOVAL: CPT | Mod: NTX,,, | Performed by: INTERNAL MEDICINE

## 2018-01-01 PROCEDURE — 27201089 HC SNARE, DISP (ANY): Mod: NTX | Performed by: INTERNAL MEDICINE

## 2018-01-01 PROCEDURE — 97165 OT EVAL LOW COMPLEX 30 MIN: CPT | Mod: NTX

## 2018-01-01 PROCEDURE — 71000039 HC RECOVERY, EACH ADD'L HOUR: Mod: NTX | Performed by: ORTHOPAEDIC SURGERY

## 2018-01-01 PROCEDURE — 36000710: Mod: NTX | Performed by: ORTHOPAEDIC SURGERY

## 2018-01-01 PROCEDURE — 88305 TISSUE EXAM BY PATHOLOGIST: CPT | Mod: NTX | Performed by: PATHOLOGY

## 2018-01-01 PROCEDURE — 27201040 HC RC 50 FILTER: Mod: 91,NTX

## 2018-01-01 PROCEDURE — G8988 SELF CARE GOAL STATUS: HCPCS | Mod: CJ,NTX

## 2018-01-01 PROCEDURE — 99223 1ST HOSP IP/OBS HIGH 75: CPT | Mod: AI,NTX,, | Performed by: NURSE PRACTITIONER

## 2018-01-01 PROCEDURE — 36600 WITHDRAWAL OF ARTERIAL BLOOD: CPT | Mod: NTX

## 2018-01-01 PROCEDURE — 25000003 PHARM REV CODE 250: Mod: NTX

## 2018-01-01 PROCEDURE — 99223 1ST HOSP IP/OBS HIGH 75: CPT | Mod: NTX,,, | Performed by: PHYSICAL MEDICINE & REHABILITATION

## 2018-01-01 PROCEDURE — 85384 FIBRINOGEN ACTIVITY: CPT | Mod: NTX

## 2018-01-01 PROCEDURE — 83605 ASSAY OF LACTIC ACID: CPT | Mod: NTX

## 2018-01-01 PROCEDURE — 20000000 HC ICU ROOM: Mod: NTX

## 2018-01-01 PROCEDURE — G8979 MOBILITY GOAL STATUS: HCPCS | Mod: CJ,NTX

## 2018-01-01 PROCEDURE — 27201423 OPTIME MED/SURG SUP & DEVICES STERILE SUPPLY: Mod: NTX | Performed by: ORTHOPAEDIC SURGERY

## 2018-01-01 PROCEDURE — 45382 COLONOSCOPY W/CONTROL BLEED: CPT | Mod: 59,NTX,, | Performed by: INTERNAL MEDICINE

## 2018-01-01 PROCEDURE — 25000003 PHARM REV CODE 250: Mod: NTX | Performed by: ANESTHESIOLOGY

## 2018-01-01 DEVICE — IMPLANTABLE DEVICE: Type: IMPLANTABLE DEVICE | Site: HIP | Status: FUNCTIONAL

## 2018-01-01 RX ORDER — INSULIN ASPART 100 [IU]/ML
0-5 INJECTION, SOLUTION INTRAVENOUS; SUBCUTANEOUS EVERY 6 HOURS PRN
Status: DISCONTINUED | OUTPATIENT
Start: 2018-01-01 | End: 2018-01-01

## 2018-01-01 RX ORDER — AMLODIPINE BESYLATE 5 MG/1
5 TABLET ORAL DAILY
Status: DISCONTINUED | OUTPATIENT
Start: 2018-01-01 | End: 2018-01-01 | Stop reason: HOSPADM

## 2018-01-01 RX ORDER — FENTANYL CITRATE 50 UG/ML
25 INJECTION, SOLUTION INTRAMUSCULAR; INTRAVENOUS EVERY 5 MIN PRN
Status: DISCONTINUED | OUTPATIENT
Start: 2018-01-01 | End: 2018-01-01 | Stop reason: HOSPADM

## 2018-01-01 RX ORDER — HYDROCODONE BITARTRATE AND ACETAMINOPHEN 500; 5 MG/1; MG/1
TABLET ORAL
Status: DISCONTINUED | OUTPATIENT
Start: 2018-01-01 | End: 2018-01-01 | Stop reason: HOSPADM

## 2018-01-01 RX ORDER — LACTULOSE 10 G/15ML
15 SOLUTION ORAL 3 TIMES DAILY
Status: DISCONTINUED | OUTPATIENT
Start: 2018-01-01 | End: 2018-01-01 | Stop reason: HOSPADM

## 2018-01-01 RX ORDER — HEPARIN SODIUM 5000 [USP'U]/ML
8000 INJECTION, SOLUTION INTRAVENOUS; SUBCUTANEOUS EVERY 12 HOURS
Qty: 10 ML | Refills: 1 | Status: ON HOLD | OUTPATIENT
Start: 2018-01-01 | End: 2019-01-01 | Stop reason: HOSPADM

## 2018-01-01 RX ORDER — HYDRALAZINE HYDROCHLORIDE 20 MG/ML
10 INJECTION INTRAMUSCULAR; INTRAVENOUS ONCE
Status: COMPLETED | OUTPATIENT
Start: 2018-01-01 | End: 2018-01-01

## 2018-01-01 RX ORDER — LIDOCAINE HYDROCHLORIDE 10 MG/ML
5 INJECTION, SOLUTION EPIDURAL; INFILTRATION; INTRACAUDAL; PERINEURAL
Status: ACTIVE | OUTPATIENT
Start: 2018-01-01 | End: 2018-01-01

## 2018-01-01 RX ORDER — ACETAMINOPHEN 325 MG/1
650 TABLET ORAL EVERY 4 HOURS PRN
Status: DISCONTINUED | OUTPATIENT
Start: 2018-01-01 | End: 2018-01-01 | Stop reason: HOSPADM

## 2018-01-01 RX ORDER — SODIUM CHLORIDE 0.9 % (FLUSH) 0.9 %
5 SYRINGE (ML) INJECTION
Status: DISCONTINUED | OUTPATIENT
Start: 2018-01-01 | End: 2018-01-01 | Stop reason: HOSPADM

## 2018-01-01 RX ORDER — PROPOFOL 10 MG/ML
VIAL (ML) INTRAVENOUS
Status: DISCONTINUED | OUTPATIENT
Start: 2018-01-01 | End: 2018-01-01

## 2018-01-01 RX ORDER — MORPHINE SULFATE 4 MG/ML
8 INJECTION, SOLUTION INTRAMUSCULAR; INTRAVENOUS
Status: COMPLETED | OUTPATIENT
Start: 2018-01-01 | End: 2018-01-01

## 2018-01-01 RX ORDER — CLOPIDOGREL BISULFATE 75 MG/1
75 TABLET ORAL DAILY
Status: CANCELLED | OUTPATIENT
Start: 2018-01-01

## 2018-01-01 RX ORDER — IBUPROFEN 200 MG
24 TABLET ORAL
Status: DISCONTINUED | OUTPATIENT
Start: 2018-01-01 | End: 2018-01-01 | Stop reason: HOSPADM

## 2018-01-01 RX ORDER — HYDROCODONE BITARTRATE AND ACETAMINOPHEN 500; 5 MG/1; MG/1
TABLET ORAL
Status: DISCONTINUED | OUTPATIENT
Start: 2018-01-01 | End: 2018-01-01

## 2018-01-01 RX ORDER — FENTANYL CITRATE 50 UG/ML
INJECTION, SOLUTION INTRAMUSCULAR; INTRAVENOUS
Status: DISCONTINUED | OUTPATIENT
Start: 2018-01-01 | End: 2018-01-01

## 2018-01-01 RX ORDER — LIDOCAINE HYDROCHLORIDE 20 MG/ML
JELLY TOPICAL
Status: DISCONTINUED | OUTPATIENT
Start: 2018-01-01 | End: 2018-01-01

## 2018-01-01 RX ORDER — PROTAMINE SULFATE 10 MG/ML
50 INJECTION, SOLUTION INTRAVENOUS ONCE
Status: COMPLETED | OUTPATIENT
Start: 2018-01-01 | End: 2018-01-01

## 2018-01-01 RX ORDER — SODIUM CHLORIDE, SODIUM LACTATE, POTASSIUM CHLORIDE, CALCIUM CHLORIDE 600; 310; 30; 20 MG/100ML; MG/100ML; MG/100ML; MG/100ML
INJECTION, SOLUTION INTRAVENOUS CONTINUOUS
Status: DISCONTINUED | OUTPATIENT
Start: 2018-01-01 | End: 2018-01-01

## 2018-01-01 RX ORDER — PROPOFOL 10 MG/ML
VIAL (ML) INTRAVENOUS CONTINUOUS PRN
Status: DISCONTINUED | OUTPATIENT
Start: 2018-01-01 | End: 2018-01-01

## 2018-01-01 RX ORDER — SODIUM CHLORIDE 0.9 G/100ML
3000 IRRIGANT IRRIGATION CONTINUOUS
Status: DISCONTINUED | OUTPATIENT
Start: 2018-01-01 | End: 2018-01-01

## 2018-01-01 RX ORDER — ACETAMINOPHEN 325 MG/1
650 TABLET ORAL
Status: DISCONTINUED | OUTPATIENT
Start: 2018-01-01 | End: 2018-01-01 | Stop reason: HOSPADM

## 2018-01-01 RX ORDER — PANTOPRAZOLE SODIUM 40 MG/10ML
40 INJECTION, POWDER, LYOPHILIZED, FOR SOLUTION INTRAVENOUS 2 TIMES DAILY
Status: DISCONTINUED | OUTPATIENT
Start: 2018-01-01 | End: 2018-01-01 | Stop reason: HOSPADM

## 2018-01-01 RX ORDER — SODIUM CHLORIDE, SODIUM LACTATE, POTASSIUM CHLORIDE, CALCIUM CHLORIDE 600; 310; 30; 20 MG/100ML; MG/100ML; MG/100ML; MG/100ML
INJECTION, SOLUTION INTRAVENOUS CONTINUOUS
Status: CANCELLED | OUTPATIENT
Start: 2018-01-01

## 2018-01-01 RX ORDER — MORPHINE SULFATE 4 MG/ML
4 INJECTION, SOLUTION INTRAMUSCULAR; INTRAVENOUS EVERY 4 HOURS PRN
Status: DISCONTINUED | OUTPATIENT
Start: 2018-01-01 | End: 2018-01-01

## 2018-01-01 RX ORDER — ONDANSETRON 8 MG/1
8 TABLET, ORALLY DISINTEGRATING ORAL EVERY 8 HOURS PRN
Status: DISCONTINUED | OUTPATIENT
Start: 2018-01-01 | End: 2018-01-01 | Stop reason: HOSPADM

## 2018-01-01 RX ORDER — CALCITRIOL 0.25 UG/1
0.5 CAPSULE ORAL DAILY
Status: DISCONTINUED | OUTPATIENT
Start: 2018-01-01 | End: 2018-01-01 | Stop reason: HOSPADM

## 2018-01-01 RX ORDER — SODIUM CHLORIDE 0.9 % (FLUSH) 0.9 %
3 SYRINGE (ML) INJECTION
Status: DISCONTINUED | OUTPATIENT
Start: 2018-01-01 | End: 2018-01-01 | Stop reason: HOSPADM

## 2018-01-01 RX ORDER — LIDOCAINE HYDROCHLORIDE 10 MG/ML
INJECTION INFILTRATION; PERINEURAL
Status: COMPLETED
Start: 2018-01-01 | End: 2018-01-01

## 2018-01-01 RX ORDER — GLUCAGON 1 MG
1 KIT INJECTION
Status: DISCONTINUED | OUTPATIENT
Start: 2018-01-01 | End: 2018-01-01 | Stop reason: HOSPADM

## 2018-01-01 RX ORDER — POLYETHYLENE GLYCOL 3350, SODIUM SULFATE ANHYDROUS, SODIUM BICARBONATE, SODIUM CHLORIDE, POTASSIUM CHLORIDE 236; 22.74; 6.74; 5.86; 2.97 G/4L; G/4L; G/4L; G/4L; G/4L
2000 POWDER, FOR SOLUTION ORAL
Status: COMPLETED | OUTPATIENT
Start: 2018-01-01 | End: 2018-01-01

## 2018-01-01 RX ORDER — DIPHENHYDRAMINE HYDROCHLORIDE 50 MG/ML
12.5 INJECTION INTRAMUSCULAR; INTRAVENOUS
Status: DISCONTINUED | OUTPATIENT
Start: 2018-01-01 | End: 2018-01-01 | Stop reason: HOSPADM

## 2018-01-01 RX ORDER — LABETALOL HYDROCHLORIDE 5 MG/ML
10 INJECTION, SOLUTION INTRAVENOUS EVERY 4 HOURS PRN
Status: DISCONTINUED | OUTPATIENT
Start: 2018-01-01 | End: 2018-01-01 | Stop reason: HOSPADM

## 2018-01-01 RX ORDER — AMLODIPINE BESYLATE 10 MG/1
10 TABLET ORAL DAILY
Status: DISCONTINUED | OUTPATIENT
Start: 2018-01-01 | End: 2018-01-01

## 2018-01-01 RX ORDER — ONDANSETRON 2 MG/ML
4 INJECTION INTRAMUSCULAR; INTRAVENOUS DAILY PRN
Status: DISCONTINUED | OUTPATIENT
Start: 2018-01-01 | End: 2018-01-01 | Stop reason: HOSPADM

## 2018-01-01 RX ORDER — HYDROCODONE BITARTRATE AND ACETAMINOPHEN 10; 325 MG/1; MG/1
1 TABLET ORAL EVERY 4 HOURS PRN
Status: DISCONTINUED | OUTPATIENT
Start: 2018-01-01 | End: 2018-01-01 | Stop reason: HOSPADM

## 2018-01-01 RX ORDER — ACETAMINOPHEN 10 MG/ML
INJECTION, SOLUTION INTRAVENOUS
Status: DISCONTINUED | OUTPATIENT
Start: 2018-01-01 | End: 2018-01-01

## 2018-01-01 RX ORDER — GABAPENTIN 100 MG/1
100 CAPSULE ORAL 3 TIMES DAILY
Status: DISCONTINUED | OUTPATIENT
Start: 2018-01-01 | End: 2018-01-01 | Stop reason: HOSPADM

## 2018-01-01 RX ORDER — GLUCAGON 1 MG
1 KIT INJECTION
Status: DISCONTINUED | OUTPATIENT
Start: 2018-01-01 | End: 2018-01-01

## 2018-01-01 RX ORDER — AMLODIPINE BESYLATE 5 MG/1
10 TABLET ORAL DAILY
Status: DISCONTINUED | OUTPATIENT
Start: 2018-01-01 | End: 2018-01-01 | Stop reason: HOSPADM

## 2018-01-01 RX ORDER — CARVEDILOL 3.12 MG/1
3.12 TABLET ORAL 2 TIMES DAILY
Qty: 60 TABLET | Refills: 11 | Status: ON HOLD | OUTPATIENT
Start: 2018-01-01 | End: 2019-01-01 | Stop reason: HOSPADM

## 2018-01-01 RX ORDER — SEVELAMER CARBONATE 800 MG/1
800 TABLET, FILM COATED ORAL
Status: DISCONTINUED | OUTPATIENT
Start: 2018-01-01 | End: 2018-01-01

## 2018-01-01 RX ORDER — MEPERIDINE HYDROCHLORIDE 50 MG/ML
12.5 INJECTION INTRAMUSCULAR; INTRAVENOUS; SUBCUTANEOUS ONCE AS NEEDED
Status: DISCONTINUED | OUTPATIENT
Start: 2018-01-01 | End: 2018-01-01 | Stop reason: HOSPADM

## 2018-01-01 RX ORDER — ROPIVACAINE HYDROCHLORIDE 5 MG/ML
INJECTION, SOLUTION EPIDURAL; INFILTRATION; PERINEURAL
Status: DISCONTINUED | OUTPATIENT
Start: 2018-01-01 | End: 2018-01-01 | Stop reason: HOSPADM

## 2018-01-01 RX ORDER — FAMOTIDINE 20 MG/1
20 TABLET, FILM COATED ORAL 2 TIMES DAILY
Status: DISCONTINUED | OUTPATIENT
Start: 2018-01-01 | End: 2018-01-01 | Stop reason: HOSPADM

## 2018-01-01 RX ORDER — ASPIRIN 81 MG/1
81 TABLET ORAL DAILY
Status: DISCONTINUED | OUTPATIENT
Start: 2018-01-01 | End: 2018-01-01 | Stop reason: HOSPADM

## 2018-01-01 RX ORDER — INSULIN ASPART 100 [IU]/ML
1-10 INJECTION, SOLUTION INTRAVENOUS; SUBCUTANEOUS
Status: DISCONTINUED | OUTPATIENT
Start: 2018-01-01 | End: 2018-01-01 | Stop reason: HOSPADM

## 2018-01-01 RX ORDER — POLYETHYLENE GLYCOL 3350 17 G/17G
17 POWDER, FOR SOLUTION ORAL DAILY
Status: DISCONTINUED | OUTPATIENT
Start: 2018-01-01 | End: 2018-01-01 | Stop reason: HOSPADM

## 2018-01-01 RX ORDER — PHENYLEPHRINE HYDROCHLORIDE 10 MG/ML
INJECTION INTRAVENOUS
Status: DISCONTINUED | OUTPATIENT
Start: 2018-01-01 | End: 2018-01-01

## 2018-01-01 RX ORDER — SODIUM CHLORIDE 9 MG/ML
INJECTION, SOLUTION INTRAVENOUS CONTINUOUS PRN
Status: DISCONTINUED | OUTPATIENT
Start: 2018-01-01 | End: 2018-01-01

## 2018-01-01 RX ORDER — SODIUM CHLORIDE 0.9 % (FLUSH) 0.9 %
5 SYRINGE (ML) INJECTION
Status: DISCONTINUED | OUTPATIENT
Start: 2018-01-01 | End: 2018-01-01

## 2018-01-01 RX ORDER — IBUPROFEN 200 MG
16 TABLET ORAL
Status: DISCONTINUED | OUTPATIENT
Start: 2018-01-01 | End: 2018-01-01 | Stop reason: HOSPADM

## 2018-01-01 RX ORDER — ONDANSETRON 2 MG/ML
4 INJECTION INTRAMUSCULAR; INTRAVENOUS EVERY 8 HOURS PRN
Status: DISCONTINUED | OUTPATIENT
Start: 2018-01-01 | End: 2018-01-01 | Stop reason: HOSPADM

## 2018-01-01 RX ORDER — SODIUM CHLORIDE 0.9 % (FLUSH) 0.9 %
3 SYRINGE (ML) INJECTION
Status: DISCONTINUED | OUTPATIENT
Start: 2018-01-01 | End: 2018-01-01

## 2018-01-01 RX ORDER — CEFAZOLIN SODIUM 1 G/50ML
1 SOLUTION INTRAVENOUS
Status: COMPLETED | OUTPATIENT
Start: 2018-01-01 | End: 2018-01-01

## 2018-01-01 RX ORDER — HYDROMORPHONE HYDROCHLORIDE 2 MG/ML
0.4 INJECTION, SOLUTION INTRAMUSCULAR; INTRAVENOUS; SUBCUTANEOUS EVERY 5 MIN PRN
Status: DISCONTINUED | OUTPATIENT
Start: 2018-01-01 | End: 2018-01-01 | Stop reason: HOSPADM

## 2018-01-01 RX ORDER — ONDANSETRON HYDROCHLORIDE 2 MG/ML
INJECTION, SOLUTION INTRAMUSCULAR; INTRAVENOUS
Status: DISCONTINUED | OUTPATIENT
Start: 2018-01-01 | End: 2018-01-01

## 2018-01-01 RX ORDER — PANTOPRAZOLE SODIUM 40 MG/10ML
80 INJECTION, POWDER, LYOPHILIZED, FOR SOLUTION INTRAVENOUS
Status: COMPLETED | OUTPATIENT
Start: 2018-01-01 | End: 2018-01-01

## 2018-01-01 RX ORDER — LIDOCAINE HYDROCHLORIDE 10 MG/ML
INJECTION INFILTRATION; PERINEURAL
Status: DISPENSED
Start: 2018-01-01 | End: 2018-01-01

## 2018-01-01 RX ORDER — INSULIN ASPART 100 [IU]/ML
5 INJECTION, SOLUTION INTRAVENOUS; SUBCUTANEOUS
Status: DISCONTINUED | OUTPATIENT
Start: 2018-01-01 | End: 2018-01-01 | Stop reason: HOSPADM

## 2018-01-01 RX ORDER — AMLODIPINE BESYLATE 5 MG/1
5 TABLET ORAL DAILY
Qty: 30 TABLET | Refills: 11 | Status: ON HOLD | OUTPATIENT
Start: 2018-01-01 | End: 2019-01-01 | Stop reason: HOSPADM

## 2018-01-01 RX ORDER — SEVELAMER CARBONATE 800 MG/1
1600 TABLET, FILM COATED ORAL
Status: DISCONTINUED | OUTPATIENT
Start: 2018-01-01 | End: 2018-01-01 | Stop reason: HOSPADM

## 2018-01-01 RX ORDER — LIDOCAINE HCL/PF 100 MG/5ML
SYRINGE (ML) INTRAVENOUS
Status: DISCONTINUED | OUTPATIENT
Start: 2018-01-01 | End: 2018-01-01

## 2018-01-01 RX ORDER — AMLODIPINE BESYLATE 5 MG/1
10 TABLET ORAL DAILY
Status: DISCONTINUED | OUTPATIENT
Start: 2018-01-01 | End: 2018-01-01

## 2018-01-01 RX ORDER — CARVEDILOL 3.12 MG/1
3.12 TABLET ORAL 2 TIMES DAILY
Status: DISCONTINUED | OUTPATIENT
Start: 2018-01-01 | End: 2018-01-01 | Stop reason: HOSPADM

## 2018-01-01 RX ORDER — SODIUM CHLORIDE 9 MG/ML
INJECTION, SOLUTION INTRAVENOUS CONTINUOUS
Status: CANCELLED | OUTPATIENT
Start: 2018-01-01

## 2018-01-01 RX ORDER — ONDANSETRON 2 MG/ML
INJECTION INTRAMUSCULAR; INTRAVENOUS
Status: DISCONTINUED | OUTPATIENT
Start: 2018-01-01 | End: 2018-01-01

## 2018-01-01 RX ORDER — ROCURONIUM BROMIDE 10 MG/ML
INJECTION, SOLUTION INTRAVENOUS
Status: DISCONTINUED | OUTPATIENT
Start: 2018-01-01 | End: 2018-01-01

## 2018-01-01 RX ORDER — DOCUSATE SODIUM 100 MG/1
100 CAPSULE, LIQUID FILLED ORAL DAILY
Status: DISCONTINUED | OUTPATIENT
Start: 2018-01-01 | End: 2018-01-01 | Stop reason: HOSPADM

## 2018-01-01 RX ORDER — GLYCOPYRROLATE 0.2 MG/ML
INJECTION INTRAMUSCULAR; INTRAVENOUS
Status: DISCONTINUED | OUTPATIENT
Start: 2018-01-01 | End: 2018-01-01

## 2018-01-01 RX ORDER — LIDOCAINE HYDROCHLORIDE 10 MG/ML
0.5 INJECTION, SOLUTION EPIDURAL; INFILTRATION; INTRACAUDAL; PERINEURAL ONCE
Status: CANCELLED | OUTPATIENT
Start: 2018-01-01 | End: 2018-01-01

## 2018-01-01 RX ORDER — ATORVASTATIN CALCIUM 20 MG/1
40 TABLET, FILM COATED ORAL DAILY
Status: DISCONTINUED | OUTPATIENT
Start: 2018-01-01 | End: 2018-01-01 | Stop reason: HOSPADM

## 2018-01-01 RX ORDER — MIDAZOLAM HYDROCHLORIDE 1 MG/ML
INJECTION INTRAMUSCULAR; INTRAVENOUS
Status: DISCONTINUED | OUTPATIENT
Start: 2018-01-01 | End: 2018-01-01

## 2018-01-01 RX ORDER — HYDROCODONE BITARTRATE AND ACETAMINOPHEN 5; 325 MG/1; MG/1
1 TABLET ORAL EVERY 4 HOURS PRN
Status: DISCONTINUED | OUTPATIENT
Start: 2018-01-01 | End: 2018-01-01 | Stop reason: HOSPADM

## 2018-01-01 RX ORDER — CINACALCET 30 MG/1
90 TABLET, FILM COATED ORAL DAILY
Status: DISCONTINUED | OUTPATIENT
Start: 2018-01-01 | End: 2018-01-01 | Stop reason: HOSPADM

## 2018-01-01 RX ORDER — ASPIRIN 81 MG/1
81 TABLET ORAL DAILY
Status: CANCELLED | OUTPATIENT
Start: 2018-01-01

## 2018-01-01 RX ORDER — INSULIN ASPART 100 [IU]/ML
0-5 INJECTION, SOLUTION INTRAVENOUS; SUBCUTANEOUS
Status: DISCONTINUED | OUTPATIENT
Start: 2018-01-01 | End: 2018-01-01 | Stop reason: HOSPADM

## 2018-01-01 RX ORDER — RAMELTEON 8 MG/1
8 TABLET ORAL NIGHTLY PRN
Status: DISCONTINUED | OUTPATIENT
Start: 2018-01-01 | End: 2018-01-01 | Stop reason: HOSPADM

## 2018-01-01 RX ORDER — OXYCODONE HYDROCHLORIDE 5 MG/1
5 TABLET ORAL
Status: DISCONTINUED | OUTPATIENT
Start: 2018-01-01 | End: 2018-01-01 | Stop reason: HOSPADM

## 2018-01-01 RX ORDER — DEXTROSE MONOHYDRATE AND SODIUM CHLORIDE 5; .9 G/100ML; G/100ML
INJECTION, SOLUTION INTRAVENOUS CONTINUOUS
Status: DISCONTINUED | OUTPATIENT
Start: 2018-01-01 | End: 2018-01-01

## 2018-01-01 RX ORDER — SODIUM CHLORIDE 9 MG/ML
INJECTION, SOLUTION INTRAVENOUS CONTINUOUS
Status: DISCONTINUED | OUTPATIENT
Start: 2018-01-01 | End: 2018-01-01

## 2018-01-01 RX ADMIN — INSULIN ASPART 2 UNITS: 100 INJECTION, SOLUTION INTRAVENOUS; SUBCUTANEOUS at 01:09

## 2018-01-01 RX ADMIN — PROTAMINE SULFATE 50 MG: 10 INJECTION, SOLUTION INTRAVENOUS at 05:09

## 2018-01-01 RX ADMIN — CINACALCET HYDROCHLORIDE 90 MG: 30 TABLET, COATED ORAL at 04:09

## 2018-01-01 RX ADMIN — Medication 1 CAPSULE: at 10:09

## 2018-01-01 RX ADMIN — INSULIN ASPART 1 UNITS: 100 INJECTION, SOLUTION INTRAVENOUS; SUBCUTANEOUS at 09:09

## 2018-01-01 RX ADMIN — AMLODIPINE BESYLATE 10 MG: 5 TABLET ORAL at 10:09

## 2018-01-01 RX ADMIN — INSULIN ASPART 2 UNITS: 100 INJECTION, SOLUTION INTRAVENOUS; SUBCUTANEOUS at 12:09

## 2018-01-01 RX ADMIN — PANTOPRAZOLE SODIUM 40 MG: 40 INJECTION, POWDER, FOR SOLUTION INTRAVENOUS at 08:09

## 2018-01-01 RX ADMIN — ASPIRIN 81 MG: 81 TABLET, COATED ORAL at 04:09

## 2018-01-01 RX ADMIN — PHENYLEPHRINE HYDROCHLORIDE 200 MCG: 10 INJECTION INTRAVENOUS at 11:09

## 2018-01-01 RX ADMIN — ACETAMINOPHEN 1000 MG: 10 INJECTION, SOLUTION INTRAVENOUS at 10:09

## 2018-01-01 RX ADMIN — INSULIN ASPART 2 UNITS: 100 INJECTION, SOLUTION INTRAVENOUS; SUBCUTANEOUS at 05:09

## 2018-01-01 RX ADMIN — MORPHINE SULFATE 4 MG: 4 INJECTION INTRAVENOUS at 02:09

## 2018-01-01 RX ADMIN — PROPOFOL 150 MCG/KG/MIN: 10 INJECTION, EMULSION INTRAVENOUS at 11:09

## 2018-01-01 RX ADMIN — ROCURONIUM BROMIDE 30 MG: 10 INJECTION, SOLUTION INTRAVENOUS at 10:09

## 2018-01-01 RX ADMIN — GABAPENTIN 100 MG: 100 CAPSULE ORAL at 09:09

## 2018-01-01 RX ADMIN — SEVELAMER CARBONATE 1600 MG: 800 TABLET, FILM COATED ORAL at 05:09

## 2018-01-01 RX ADMIN — DEXTROSE 2 G: 50 INJECTION, SOLUTION INTRAVENOUS at 10:09

## 2018-01-01 RX ADMIN — CINACALCET HYDROCHLORIDE 90 MG: 30 TABLET, COATED ORAL at 10:09

## 2018-01-01 RX ADMIN — PANTOPRAZOLE SODIUM 40 MG: 40 INJECTION, POWDER, FOR SOLUTION INTRAVENOUS at 09:09

## 2018-01-01 RX ADMIN — INSULIN ASPART 2 UNITS: 100 INJECTION, SOLUTION INTRAVENOUS; SUBCUTANEOUS at 08:09

## 2018-01-01 RX ADMIN — SEVELAMER CARBONATE 1600 MG: 800 TABLET, FILM COATED ORAL at 08:09

## 2018-01-01 RX ADMIN — SEVELAMER CARBONATE 1600 MG: 800 TABLET, FILM COATED ORAL at 07:09

## 2018-01-01 RX ADMIN — POLYETHYLENE GLYCOL 3350, SODIUM SULFATE ANHYDROUS, SODIUM BICARBONATE, SODIUM CHLORIDE, POTASSIUM CHLORIDE 2000 ML: 236; 22.74; 6.74; 5.86; 2.97 POWDER, FOR SOLUTION ORAL at 06:09

## 2018-01-01 RX ADMIN — FENTANYL CITRATE 50 MCG: 50 INJECTION, SOLUTION INTRAMUSCULAR; INTRAVENOUS at 10:09

## 2018-01-01 RX ADMIN — AMLODIPINE BESYLATE 10 MG: 5 TABLET ORAL at 09:09

## 2018-01-01 RX ADMIN — CARVEDILOL 3.12 MG: 3.12 TABLET, FILM COATED ORAL at 09:09

## 2018-01-01 RX ADMIN — MIDAZOLAM HYDROCHLORIDE 2 MG: 1 INJECTION, SOLUTION INTRAMUSCULAR; INTRAVENOUS at 10:09

## 2018-01-01 RX ADMIN — ASPIRIN 81 MG: 81 TABLET, COATED ORAL at 10:09

## 2018-01-01 RX ADMIN — CARBOXYMETHYLCELLULOSE SODIUM 2 DROP: 2.5 SOLUTION/ DROPS OPHTHALMIC at 10:09

## 2018-01-01 RX ADMIN — INSULIN ASPART 3 UNITS: 100 INJECTION, SOLUTION INTRAVENOUS; SUBCUTANEOUS at 06:09

## 2018-01-01 RX ADMIN — ASPIRIN 81 MG: 81 TABLET, COATED ORAL at 09:09

## 2018-01-01 RX ADMIN — ASPIRIN 81 MG: 81 TABLET, COATED ORAL at 08:09

## 2018-01-01 RX ADMIN — IOHEXOL 100 ML: 350 INJECTION, SOLUTION INTRAVENOUS at 04:09

## 2018-01-01 RX ADMIN — HYDROCODONE BITARTRATE AND ACETAMINOPHEN 1 TABLET: 5; 325 TABLET ORAL at 05:09

## 2018-01-01 RX ADMIN — SODIUM CHLORIDE, SODIUM LACTATE, POTASSIUM CHLORIDE, AND CALCIUM CHLORIDE: .6; .31; .03; .02 INJECTION, SOLUTION INTRAVENOUS at 04:09

## 2018-01-01 RX ADMIN — Medication 1 CAPSULE: at 08:09

## 2018-01-01 RX ADMIN — CALCITRIOL 0.5 MCG: 0.25 CAPSULE ORAL at 10:09

## 2018-01-01 RX ADMIN — INSULIN ASPART 8 UNITS: 100 INJECTION, SOLUTION INTRAVENOUS; SUBCUTANEOUS at 10:09

## 2018-01-01 RX ADMIN — SODIUM CHLORIDE 250 ML: 0.9 INJECTION, SOLUTION INTRAVENOUS at 07:09

## 2018-01-01 RX ADMIN — HYDROCODONE BITARTRATE AND ACETAMINOPHEN 1 TABLET: 10; 325 TABLET ORAL at 05:09

## 2018-01-01 RX ADMIN — INSULIN ASPART 2 UNITS: 100 INJECTION, SOLUTION INTRAVENOUS; SUBCUTANEOUS at 11:09

## 2018-01-01 RX ADMIN — FAMOTIDINE 20 MG: 20 TABLET ORAL at 08:09

## 2018-01-01 RX ADMIN — MORPHINE SULFATE 4 MG: 4 INJECTION INTRAVENOUS at 06:09

## 2018-01-01 RX ADMIN — PANTOPRAZOLE SODIUM 80 MG: 40 INJECTION, POWDER, FOR SOLUTION INTRAVENOUS at 11:09

## 2018-01-01 RX ADMIN — INSULIN ASPART 2 UNITS: 100 INJECTION, SOLUTION INTRAVENOUS; SUBCUTANEOUS at 09:09

## 2018-01-01 RX ADMIN — Medication 1 CAPSULE: at 09:09

## 2018-01-01 RX ADMIN — FAMOTIDINE 20 MG: 20 TABLET ORAL at 10:09

## 2018-01-01 RX ADMIN — CINACALCET HYDROCHLORIDE 90 MG: 30 TABLET, COATED ORAL at 09:09

## 2018-01-01 RX ADMIN — FAMOTIDINE 20 MG: 20 TABLET ORAL at 09:09

## 2018-01-01 RX ADMIN — MORPHINE SULFATE 4 MG: 4 INJECTION INTRAVENOUS at 12:09

## 2018-01-01 RX ADMIN — POLYETHYLENE GLYCOL 3350 17 G: 17 POWDER, FOR SOLUTION ORAL at 08:09

## 2018-01-01 RX ADMIN — SUGAMMADEX 400 MG: 100 INJECTION, SOLUTION INTRAVENOUS at 11:09

## 2018-01-01 RX ADMIN — CALCITRIOL 0.5 MCG: 0.25 CAPSULE ORAL at 09:09

## 2018-01-01 RX ADMIN — CEFAZOLIN SODIUM 1 G: 1 SOLUTION INTRAVENOUS at 03:09

## 2018-01-01 RX ADMIN — INSULIN DETEMIR 10 UNITS: 100 INJECTION, SOLUTION SUBCUTANEOUS at 10:09

## 2018-01-01 RX ADMIN — INSULIN ASPART 2 UNITS: 100 INJECTION, SOLUTION INTRAVENOUS; SUBCUTANEOUS at 06:09

## 2018-01-01 RX ADMIN — SODIUM CHLORIDE: 0.9 INJECTION, SOLUTION INTRAVENOUS at 08:09

## 2018-01-01 RX ADMIN — AMLODIPINE BESYLATE 5 MG: 5 TABLET ORAL at 09:09

## 2018-01-01 RX ADMIN — ATORVASTATIN CALCIUM 40 MG: 20 TABLET, FILM COATED ORAL at 10:09

## 2018-01-01 RX ADMIN — Medication 1 CAPSULE: at 04:09

## 2018-01-01 RX ADMIN — HYDROCODONE BITARTRATE AND ACETAMINOPHEN 1 TABLET: 10; 325 TABLET ORAL at 01:09

## 2018-01-01 RX ADMIN — GLYCOPYRROLATE 0.2 MG: 0.2 INJECTION, SOLUTION INTRAMUSCULAR; INTRAVENOUS at 11:09

## 2018-01-01 RX ADMIN — INSULIN ASPART 4 UNITS: 100 INJECTION, SOLUTION INTRAVENOUS; SUBCUTANEOUS at 01:09

## 2018-01-01 RX ADMIN — SEVELAMER CARBONATE 800 MG: 800 TABLET, FILM COATED ORAL at 06:09

## 2018-01-01 RX ADMIN — SEVELAMER CARBONATE 1600 MG: 800 TABLET, FILM COATED ORAL at 12:09

## 2018-01-01 RX ADMIN — PROPOFOL 150 MG: 10 INJECTION, EMULSION INTRAVENOUS at 10:09

## 2018-01-01 RX ADMIN — INSULIN ASPART 6 UNITS: 100 INJECTION, SOLUTION INTRAVENOUS; SUBCUTANEOUS at 02:09

## 2018-01-01 RX ADMIN — AMPICILLIN SODIUM 1 G: 1 INJECTION, POWDER, FOR SOLUTION INTRAMUSCULAR; INTRAVENOUS at 10:09

## 2018-01-01 RX ADMIN — INSULIN ASPART 4 UNITS: 100 INJECTION, SOLUTION INTRAVENOUS; SUBCUTANEOUS at 12:09

## 2018-01-01 RX ADMIN — DOCUSATE SODIUM 100 MG: 100 CAPSULE, LIQUID FILLED ORAL at 12:09

## 2018-01-01 RX ADMIN — INSULIN DETEMIR 15 UNITS: 100 INJECTION, SOLUTION SUBCUTANEOUS at 08:09

## 2018-01-01 RX ADMIN — CINACALCET HYDROCHLORIDE 90 MG: 30 TABLET, COATED ORAL at 08:09

## 2018-01-01 RX ADMIN — SEVELAMER CARBONATE 800 MG: 800 TABLET, FILM COATED ORAL at 10:09

## 2018-01-01 RX ADMIN — ATORVASTATIN CALCIUM 40 MG: 20 TABLET, FILM COATED ORAL at 08:09

## 2018-01-01 RX ADMIN — LIDOCAINE HYDROCHLORIDE 80 MG: 20 INJECTION, SOLUTION INTRAVENOUS at 11:09

## 2018-01-01 RX ADMIN — PROPOFOL 40 MG: 10 INJECTION, EMULSION INTRAVENOUS at 11:09

## 2018-01-01 RX ADMIN — LIDOCAINE HYDROCHLORIDE 75 MG: 20 INJECTION, SOLUTION INTRAVENOUS at 10:09

## 2018-01-01 RX ADMIN — INSULIN ASPART 1 UNITS: 100 INJECTION, SOLUTION INTRAVENOUS; SUBCUTANEOUS at 11:09

## 2018-01-01 RX ADMIN — INSULIN ASPART 5 UNITS: 100 INJECTION, SOLUTION INTRAVENOUS; SUBCUTANEOUS at 12:09

## 2018-01-01 RX ADMIN — LIDOCAINE HYDROCHLORIDE 100 MG: 10 INJECTION, SOLUTION INFILTRATION; PERINEURAL at 02:09

## 2018-01-01 RX ADMIN — SODIUM CHLORIDE 1000 ML: 0.9 INJECTION, SOLUTION INTRAVENOUS at 11:09

## 2018-01-01 RX ADMIN — ACETAMINOPHEN 650 MG: 325 TABLET, FILM COATED ORAL at 12:09

## 2018-01-01 RX ADMIN — INSULIN ASPART 5 UNITS: 100 INJECTION, SOLUTION INTRAVENOUS; SUBCUTANEOUS at 05:09

## 2018-01-01 RX ADMIN — SODIUM CHLORIDE: 9 INJECTION, SOLUTION INTRAVENOUS at 11:09

## 2018-01-01 RX ADMIN — CEFAZOLIN SODIUM 1 G: 1 SOLUTION INTRAVENOUS at 06:09

## 2018-01-01 RX ADMIN — POLYETHYLENE GLYCOL 3350 17 G: 17 POWDER, FOR SOLUTION ORAL at 10:09

## 2018-01-01 RX ADMIN — ONDANSETRON 4 MG: 2 INJECTION INTRAMUSCULAR; INTRAVENOUS at 11:09

## 2018-01-01 RX ADMIN — INSULIN ASPART 4 UNITS: 100 INJECTION, SOLUTION INTRAVENOUS; SUBCUTANEOUS at 08:09

## 2018-01-01 RX ADMIN — EPOETIN ALFA 6000 UNITS: 4000 SOLUTION INTRAVENOUS; SUBCUTANEOUS at 10:09

## 2018-01-01 RX ADMIN — HYDROCODONE BITARTRATE AND ACETAMINOPHEN 1 TABLET: 5; 325 TABLET ORAL at 12:09

## 2018-01-01 RX ADMIN — POLYETHYLENE GLYCOL 3350, SODIUM SULFATE ANHYDROUS, SODIUM BICARBONATE, SODIUM CHLORIDE, POTASSIUM CHLORIDE 2000 ML: 236; 22.74; 6.74; 5.86; 2.97 POWDER, FOR SOLUTION ORAL at 04:09

## 2018-01-01 RX ADMIN — ROCURONIUM BROMIDE 10 MG: 10 INJECTION, SOLUTION INTRAVENOUS at 11:09

## 2018-01-01 RX ADMIN — SODIUM CHLORIDE: 0.9 INJECTION, SOLUTION INTRAVENOUS at 02:09

## 2018-01-01 RX ADMIN — HYDROCODONE BITARTRATE AND ACETAMINOPHEN 1 TABLET: 5; 325 TABLET ORAL at 04:09

## 2018-01-01 RX ADMIN — POLYETHYLENE GLYCOL 3350 17 G: 17 POWDER, FOR SOLUTION ORAL at 05:09

## 2018-01-01 RX ADMIN — INSULIN DETEMIR 10 UNITS: 100 INJECTION, SOLUTION SUBCUTANEOUS at 07:09

## 2018-01-01 RX ADMIN — SEVELAMER CARBONATE 800 MG: 800 TABLET, FILM COATED ORAL at 08:09

## 2018-01-01 RX ADMIN — SEVELAMER CARBONATE 1600 MG: 800 TABLET, FILM COATED ORAL at 10:09

## 2018-01-01 RX ADMIN — INSULIN ASPART 3 UNITS: 100 INJECTION, SOLUTION INTRAVENOUS; SUBCUTANEOUS at 08:09

## 2018-01-01 RX ADMIN — INSULIN ASPART 2 UNITS: 100 INJECTION, SOLUTION INTRAVENOUS; SUBCUTANEOUS at 07:09

## 2018-01-01 RX ADMIN — SODIUM CHLORIDE: 0.9 INJECTION, SOLUTION INTRAVENOUS at 09:09

## 2018-01-01 RX ADMIN — AMLODIPINE BESYLATE 5 MG: 5 TABLET ORAL at 08:09

## 2018-01-01 RX ADMIN — GENTAMICIN SULFATE 116.8 MG: 40 INJECTION, SOLUTION INTRAMUSCULAR; INTRAVENOUS at 10:09

## 2018-01-01 RX ADMIN — HYDROCODONE BITARTRATE AND ACETAMINOPHEN 1 TABLET: 5; 325 TABLET ORAL at 06:09

## 2018-01-01 RX ADMIN — SEVELAMER CARBONATE 800 MG: 800 TABLET, FILM COATED ORAL at 04:09

## 2018-01-01 RX ADMIN — POLYETHYLENE GLYCOL 3350 17 G: 17 POWDER, FOR SOLUTION ORAL at 09:09

## 2018-01-01 RX ADMIN — ATORVASTATIN CALCIUM 40 MG: 20 TABLET, FILM COATED ORAL at 09:09

## 2018-01-01 RX ADMIN — CARVEDILOL 3.12 MG: 3.12 TABLET, FILM COATED ORAL at 08:09

## 2018-01-01 RX ADMIN — INSULIN DETEMIR 10 UNITS: 100 INJECTION, SOLUTION SUBCUTANEOUS at 08:09

## 2018-01-01 RX ADMIN — CALCITRIOL 0.5 MCG: 0.25 CAPSULE ORAL at 05:09

## 2018-01-01 RX ADMIN — INSULIN ASPART 5 UNITS: 100 INJECTION, SOLUTION INTRAVENOUS; SUBCUTANEOUS at 08:09

## 2018-01-01 RX ADMIN — SEVELAMER CARBONATE 1600 MG: 800 TABLET, FILM COATED ORAL at 09:09

## 2018-01-01 RX ADMIN — ATORVASTATIN CALCIUM 40 MG: 20 TABLET, FILM COATED ORAL at 05:09

## 2018-01-01 RX ADMIN — INSULIN ASPART 5 UNITS: 100 INJECTION, SOLUTION INTRAVENOUS; SUBCUTANEOUS at 01:09

## 2018-01-01 RX ADMIN — HYDRALAZINE HYDROCHLORIDE 10 MG: 20 INJECTION INTRAMUSCULAR; INTRAVENOUS at 04:09

## 2018-01-01 RX ADMIN — LABETALOL HYDROCHLORIDE 10 MG: 5 INJECTION, SOLUTION INTRAVENOUS at 04:09

## 2018-01-01 RX ADMIN — SEVELAMER CARBONATE 800 MG: 800 TABLET, FILM COATED ORAL at 09:09

## 2018-01-01 RX ADMIN — CALCITRIOL 0.5 MCG: 0.25 CAPSULE ORAL at 08:09

## 2018-01-01 RX ADMIN — ONDANSETRON 4 MG: 2 INJECTION, SOLUTION INTRAMUSCULAR; INTRAVENOUS at 10:09

## 2018-01-01 RX ADMIN — MORPHINE SULFATE 8 MG: 4 INJECTION, SOLUTION INTRAMUSCULAR; INTRAVENOUS at 11:09

## 2018-01-01 RX ADMIN — SEVELAMER CARBONATE 800 MG: 800 TABLET, FILM COATED ORAL at 02:09

## 2018-01-01 RX ADMIN — AMLODIPINE BESYLATE 10 MG: 5 TABLET ORAL at 08:09

## 2018-01-01 RX ADMIN — INSULIN ASPART 6 UNITS: 100 INJECTION, SOLUTION INTRAVENOUS; SUBCUTANEOUS at 09:09

## 2018-01-01 RX ADMIN — INSULIN DETEMIR 10 UNITS: 100 INJECTION, SOLUTION SUBCUTANEOUS at 09:09

## 2018-01-23 DIAGNOSIS — Z76.82 AWAITING ORGAN TRANSPLANT STATUS: Primary | ICD-10-CM

## 2018-01-30 ENCOUNTER — HOSPITAL ENCOUNTER (OUTPATIENT)
Dept: RADIOLOGY | Facility: HOSPITAL | Age: 48
Discharge: HOME | End: 2018-01-30
Attending: TRANSPLANT SURGERY
Payer: MEDICARE

## 2018-01-30 DIAGNOSIS — Z76.82 AWAITING ORGAN TRANSPLANT STATUS: ICD-10-CM

## 2018-01-30 PROCEDURE — 74176 CT ABD & PELVIS W/O CONTRAST: CPT | Mod: 26,TXP,, | Performed by: RADIOLOGY

## 2018-01-30 PROCEDURE — 74176 CT ABD & PELVIS W/O CONTRAST: CPT | Mod: TC,TXP

## 2018-02-05 NOTE — PROGRESS NOTES
Extensive calcifications. Obese with abdominal pannus.  Transplant is currently feasible but complicated by these factors.  Would recommend weight loss referral.  If not transplanted in 2 years, will need repeat CT pelvis noncon.   Needs iliac doppler also.

## 2018-06-20 PROBLEM — E86.0 DEHYDRATION: Status: RESOLVED | Noted: 2017-02-22 | Resolved: 2018-06-20

## 2018-06-20 PROBLEM — E86.9 VOLUME DEPLETION: Status: RESOLVED | Noted: 2017-02-22 | Resolved: 2018-06-20

## 2018-09-11 NOTE — TELEPHONE ENCOUNTER
----- Message from Katt Villegas MA sent at 9/11/2018  4:02 PM CDT -----  Contact: Sari Fuentes; Sister  [phone]  Patient broke hip on vacation; patient is in hospital in [facility].  Sister wants to discuss further with pcp.  Please call sister at number above.  Thanks.

## 2018-09-12 PROBLEM — N18.6 ESRD (END STAGE RENAL DISEASE) ON DIALYSIS: Status: ACTIVE | Noted: 2018-01-01

## 2018-09-12 PROBLEM — S72.002A CLOSED FRACTURE OF LEFT HIP: Status: ACTIVE | Noted: 2018-01-01

## 2018-09-12 PROBLEM — Z99.2 ESRD (END STAGE RENAL DISEASE) ON DIALYSIS: Status: ACTIVE | Noted: 2018-01-01

## 2018-09-12 NOTE — ASSESSMENT & PLAN NOTE
CT- Acute incomplete fracture involving the left femoral neck    Consult Ortho  NPO for procedure tomorrow  Will hold blood thinners

## 2018-09-12 NOTE — PLAN OF CARE
Problem: Patient Care Overview  Goal: Plan of Care Review  Outcome: Ongoing (interventions implemented as appropriate)  Received report from Kavitha in ED. Patient remains free from injury or falls. Vital signs stable throughout night on room air. Positions self with assist x 2. No out put yet this shift. Pain mildy managed with IV medications. Patient is NPO. Patient has a fracture to the left hip and in extreme pain when touched or movement to the left side. Telemetry maintained. Bed in low locked position and call light within reach. Will continue to monitor.

## 2018-09-12 NOTE — SUBJECTIVE & OBJECTIVE
Past Medical History:   Diagnosis Date    Abnormal finding on Pap smear, HPV DNA positive     Anemia associated with chronic renal failure     on Epogen    Blood type B+     Bulging discs - symptomatic      CAD (coronary artery disease)     Diabetes mellitus, type 2     ESRD (end stage renal disease) 2004    FSGS (focal segmental glomerulosclerosis)     with collapsing    Hyperlipidemia     Hypertension     Neuropathy     Obesity     Secondary hyperparathyroidism, renal     TIA (transient ischemic attack)     Uterine fibroid     small uterine        Past Surgical History:   Procedure Laterality Date    CARDIAC CATHETERIZATION      PCI x 2     SECTION, CLASSIC      DEBRIDEMENT-WOUND Left 2016    [facility]    DIALYSIS FISTULA CREATION      multiple fistulas and grafts before PD     INCISION AND DRAINAGE (I&D), LABIAL N/A 2016    [facility]    INCISION AND DRAINAGE (I&D), LABIAL (ADD ON) Left 2016    [facility]    INCISION AND DRAINAGE OF WOUND Left     VULVAR ABCESS WITH NECROSIS    PERITONEAL CATHETER INSERTION      PERMCATH REWIRE- TUNNELED CATH REWIRE Left 2017    [facility]    PERMCATH REWIRE- TUNNELED CATH REWIRE N/A 10/5/2017    [facility]ATH [facility]    UMBILICAL HERNIA REPAIR         Review of patient's allergies indicates:   Allergen Reactions    Clindamycin Diarrhea    Flagyl [metronidazole hcl]        No current facility-administered medications on file prior to encounter.      Current Outpatient Medications on File Prior to Encounter   Medication Sig    amlodipine (NORVASC) 10 MG tablet Take 10 mg by mouth once daily.     atorvastatin (LIPITOR) 40 MG tablet Take 40 mg by mouth once daily.    calcitRIOL (ROCALTROL) 0.5 MCG Cap Take 1 capsule by mouth once daily.      aspirin (ECOTRIN) 81 MG EC tablet Take 1 tablet (81 mg total) by mouth once daily.    cilostazol (PLETAL) 50 MG Tab Take 1 tablet (50 mg total) by mouth 2 (two) times daily.    cinacalcet (SENSIPAR) 90 MG Tab Take 1 tablet by mouth once daily.     epoetin adonay 10,000 unit/mL Soln 1 mL, epoetin adonay 20,000 unit/mL Soln 1 mL Inject 30,000 Units into the vein every 14 (fourteen) days.    gabapentin (NEURONTIN) 100 MG capsule 1 capsule 3 (three) times daily.    gabapentin (NEURONTIN) 800 MG tablet 1 tablet 3 (three) times daily.    HUMULIN R REGULAR U-100 INSULN 100 unit/mL injection     nateglinide (STARLIX) 120 MG tablet STARLIX 120MG 30 MINUTES BEFORE EACH MEAL.    ONETOUCH VERIO Strp USE 1 STRIP TWICE DAILY IN VITRO    PLAVIX 75 mg tablet TAKE 1 BY MOUTH DAILY    sevelamer carbonate (RENVELA) 800 mg Tab     vitamin renal formula, B-complex-vitamin c-folic acid, (B COMPLEX-C-FOLIC ACID) 1 mg Cap Take 1 capsule by mouth once daily. 1 Capsule Oral Every day     Family History     Problem Relation (Age of Onset)    Cancer Maternal Grandmother, Paternal Grandfather    Diabetes Maternal Aunt, Paternal Aunt        Tobacco Use    Smoking status: Never Smoker    Smokeless tobacco: Never Used   Substance and Sexual Activity    Alcohol use: No     Comment: Pt reports some social use of about 1-2 drinks about every six months.    Drug use: No    Sexual activity: Not Currently     Partners: Male     Birth control/protection: None     Review of Systems   Constitutional: Positive for activity change. Negative for appetite change and fever.   HENT: Negative for congestion, ear pain, rhinorrhea and sinus pressure.    Eyes: Negative for pain and discharge.   Respiratory: Negative for cough, chest tightness, shortness of breath and wheezing.    Cardiovascular: Negative for chest pain and leg swelling.   Gastrointestinal: Negative for abdominal distention, abdominal pain, diarrhea, nausea and vomiting.   Endocrine:  Negative for cold intolerance and heat intolerance.   Genitourinary: Negative for difficulty urinating, flank pain, frequency, hematuria and urgency.   Musculoskeletal: Positive for arthralgias, gait problem, joint swelling and myalgias.   Allergic/Immunologic: Negative for environmental allergies and food allergies.   Neurological: Negative for dizziness, weakness, light-headedness and headaches.   Hematological: Does not bruise/bleed easily.   Psychiatric/Behavioral: Negative for agitation, behavioral problems and decreased concentration.     Objective:     Vital Signs (Most Recent):  Temp: 97.9 °F (36.6 °C) (09/12/18 0205)  Pulse: 96 (09/12/18 0226)  Resp: 18 (09/12/18 0205)  BP: (!) 178/76 (09/12/18 0205)  SpO2: 98 % (09/12/18 0205) Vital Signs (24h Range):  Temp:  [97.9 °F (36.6 °C)-99 °F (37.2 °C)] 97.9 °F (36.6 °C)  Pulse:  [] 96  Resp:  [18-20] 18  SpO2:  [98 %-100 %] 98 %  BP: (140-178)/() 178/76     Weight: 99.8 kg (220 lb)  Body mass index is 34.46 kg/m².    Physical Exam   Constitutional: She is oriented to person, place, and time. She appears well-developed and well-nourished.   HENT:   Head: Normocephalic.   Eyes: Conjunctivae are normal. Right eye exhibits no discharge. Left eye exhibits no discharge.   Neck: Normal range of motion. Neck supple.   Cardiovascular: Regular rhythm, normal heart sounds and intact distal pulses. Tachycardia present.   Pulses:       Radial pulses are 2+ on the right side, and 2+ on the left side.   Pulmonary/Chest: Effort normal and breath sounds normal. Tachypnea noted. No respiratory distress.   Abdominal: Soft. Bowel sounds are normal. She exhibits no distension. There is no tenderness.   Musculoskeletal:        Left hip: She exhibits decreased range of motion, tenderness and swelling.   Neurological: She is alert and oriented to person, place, and time. She has normal strength. GCS eye subscore is 4. GCS verbal subscore is 5. GCS motor subscore is 6.   Skin:  Skin is warm and dry.   Psychiatric: She has a normal mood and affect. Her speech is normal and behavior is normal.           Significant Labs:   CBC:   Recent Labs   Lab  09/11/18   2312   WBC  11.59   HGB  7.1*   HCT  21.5*   PLT  417*     CMP:   Recent Labs   Lab  09/12/18   0004   NA  135*   K  3.9   CL  95   CO2  21*   GLU  156*   BUN  59*   CREATININE  13.9*   CALCIUM  9.1   ANIONGAP  19*   EGFRNONAA  3*       Significant Imaging: I have reviewed all pertinent imaging results/findings within the past 24 hours.

## 2018-09-12 NOTE — NURSING
Monitored CBG. Patient without complaints. Pain controlled with IV medication. Food ordered and left at bedside. Family visited. Nephrology team rounded and spoke with Dr. Grullon to plan for surgery in AM 09/13. Dialysis ordered for tonight.
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
## 2018-09-12 NOTE — PLAN OF CARE
Problem: Patient Care Overview  Goal: Plan of Care Review  Outcome: Ongoing (interventions implemented as appropriate)  Patient remains on room air. Pulse ox on left index finger, pox 85% with poor quality. Adhesive pulse ox probe used on left hand used and reads 94-95% on room air.
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
## 2018-09-12 NOTE — PLAN OF CARE
SW met with pt at bedside to complete discharge assessment, verfied PCP and uses [facility] on [address]  Pt's sister or father will provide transportation home.  Pt does home dialysis 7 days/wk; 9 hrs/day and has BP cuff, rollator, electric scooter and SC.  KEELY will reassess pt after possible surgery, may need HH.     09/12/18 0957   Discharge Assessment   Assessment Type Discharge Planning Assessment   Confirmed/corrected address and phone number on facesheet? Yes   Assessment information obtained from? Patient   Communicated expected length of stay with patient/caregiver no   Prior to hospitilization cognitive status: Alert/Oriented   Prior to hospitalization functional status: Independent;Assistive Equipment   Current cognitive status: Alert/Oriented   Current Functional Status: Assistive Equipment;Needs Assistance   Able to Return to Prior Arrangements yes   Is patient able to care for self after discharge? Unable to determine at this time (comments)   Patient's perception of discharge disposition home or selfcare;home health   Readmission Within The Last 30 Days no previous admission in last 30 days   Patient currently being followed by outpatient case management? No   Patient currently receives any other outside agency services? No   Equipment Currently Used at Home rollator;shower chair  (electric scooter)   Do you have any problems affording any of your prescribed medications? No   Is the patient taking medications as prescribed? yes   Transportation Available family or friend will provide   Does the patient receive services at the [facility]? No   Discharge Plan A Home Health   Discharge Plan B Home   Patient/Family In Agreement With Plan yes   Does the patient receive outpatient dialysis? Yes  (Home dialysis 7 days/week; 9 hr/day)

## 2018-09-12 NOTE — NURSING
PD in progress. Patient denies pain at this time. Does not make urine. Food at bedside untouched. Conversant but wants to sleep.

## 2018-09-12 NOTE — ED NOTES
"Pt lying down in bed sleeping ,arouses easily to voice, states she feels " more comfortable" after receiving medication. Pt denies any needs at this time. Side rails upx2, call bell within reach. Will continue to monitor   "
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
## 2018-09-13 PROBLEM — R74.8 ABNORMAL LIVER ENZYMES: Status: ACTIVE | Noted: 2018-01-01

## 2018-09-13 PROBLEM — S72.002A CLOSED FRACTURE OF LEFT HIP: Status: ACTIVE | Noted: 2018-01-01

## 2018-09-13 PROBLEM — S72.002A CLOSED FRACTURE OF LEFT HIP: Status: RESOLVED | Noted: 2018-01-01 | Resolved: 2018-01-01

## 2018-09-13 PROBLEM — S72.002D CLOSED FRACTURE OF LEFT HIP WITH ROUTINE HEALING: Status: ACTIVE | Noted: 2018-01-01

## 2018-09-13 NOTE — HOSPITAL COURSE
Nephrology was consulted and peritoneal dialysis continued.  Orthopedic surgery was also consulted secondary to findings of CT of the left hip which revealed an acute incomplete fracture involving the left femoral neck that was non-displaced.  The patient had a left hip ORIF performed on 9/13/2018.  The patient had a decrease in hemoglobin to 6.2 post-operatively and was transfused 1U PRBCs which she tolerated well.  PT/OT evaluated the patient and assisted with toe touch weight bearing only of the left extremity.  The patient will be transferred to inpatient rehab unit for continued recovery.  The patient will continue Heparin for DVT prophylaxis.

## 2018-09-13 NOTE — TRANSFER OF CARE
"Anesthesia Transfer of Care Note    Patient: Jenni Todd    Procedure(s) Performed: Procedure(s) (LRB):  ORIF, HIP (Left)    Patient location: PACU    Anesthesia Type: general    Transport from OR: Transported from OR on 2-3 L/min O2 by NC with adequate spontaneous ventilation    Post pain: adequate analgesia    Post assessment: no apparent anesthetic complications    Post vital signs: stable    Level of consciousness: awake    Nausea/Vomiting: no nausea/vomiting    Complications: none    Transfer of care protocol was followed      Last vitals:   Visit Vitals  /67 (BP Location: Right arm, Patient Position: Lying)   Pulse 92   Temp 37.9 °C (100.2 °F) (Oral)   Resp 18   Ht 5' 7" (1.702 m)   Wt 99.8 kg (219 lb 16 oz)   LMP 01/28/2014 (Approximate)   SpO2 96%   Breastfeeding? No   BMI 34.46 kg/m²     "

## 2018-09-13 NOTE — ASSESSMENT & PLAN NOTE
H/H 7.6/23 pre-op  Chronic anemia in combination with expected blood loss with surgery may further decrease levels  Will transfuse with Hb<7.0  Continue to monitor closely

## 2018-09-13 NOTE — OP NOTE
DATE OF PROCEDURE:  09/13/2018.    CHIEF COMPLAINT AND PRESENT ILLNESS:  The patient is a 48-year-old with lot of   medical issues, on peritoneal dialysis, has a left femoral neck fracture,   nondisplaced, consented for ORIF, fully understands risks and benefits of   surgery, especially with medical history.    PREOPERATIVE DIAGNOSIS:  Left femoral neck fracture.    POSTOPERATIVE DIAGNOSIS:  Left femoral neck fracture.    PROCEDURE:  ORIF of left femoral neck fracture using ACE 6.5 cannulated screws.    SURGEON:  Tevin Grullon M.D.    ASSISTANT:  [place] CST.    COMPLICATIONS:  None.    ANESTHESIA:  General anesthesia.    BLOOD LOSS:  Minimal.    PROCEDURE IN DETAIL:  The patient was brought to the Operating Room and   underwent general intubation without difficulty.  She was placed carefully on   the fracture table and visualization with C-arm showed anatomic alignment of the   femoral neck fracture.  She was prepped and draped in the usual sterile   fashion.  Using a incision after I lined it up with K-wire and marking pen, I   put a little incision out lateral.  She had a lot of this edematous tissue in   the skin, no surprise because of her multiple medical problems.  In any event, I   had a small lateral incision and then with the guidewire, I put three wires   into the center of the head in a triangular fashion, two in the center and one   inferior.  Bone quality was great, no surprise because of her medical problems.    I left them about 5 to 8 mm short on all of them and did a little overdrill.    When I overdrilled one of them, the wire went through the head, but not into the   acetabulum, just perforated a little bit I believe.  So I tried to keep good   tabs on this.  The other two drilling, they did migrate, but when I was putting   the screws in, the other two migrated, it was just, I just could not hold them   in even though it was under the subchondral bone, but did not perforate the    acetabulum.  Again, great care was turned to do this, doing it manual is just   difficult.  The C-arm showed good placement of the screws, appropriate length   and fixation.  Instruments were then removed.  A 2-0 Vicryl was used   subcutaneously and Steri-Strips on the skin.  Then, 0.5% ropivacaine was   instilled in the soft tissues.  She was placed in sterile bandage, brought to   Recovery Room in stable fashion.  Leg lengths are equal.      DIONE/IN  dd: 09/13/2018 11:45:17 (CDT)  td: 09/13/2018 15:44:20 (CDT)  Doc ID   #[id number]  Job ID #[id number]    CC:

## 2018-09-13 NOTE — PROGRESS NOTES
Pt blood pressure 184/85, temperature 101.1. As per Manfred Gurrola administer prn Tylenol. No new orders given for blood pressure. Will continue to monitor.

## 2018-09-13 NOTE — PT/OT/SLP EVAL
Physical Therapy Evaluation and Treatemtn    Patient Name:  Jenni Todd   MRN:  [id number]    Recommendations:     Discharge Recommendations:  home, home health PT, home health OT   Discharge Equipment Recommendations: none   Barriers to discharge: None    Assessment:     Jenni Todd is a 48 y.o. female admitted with a medical diagnosis of Closed fracture of left hip with routine healing.  She presents with the following impairments/functional limitations:  weakness, gait instability, impaired balance, impaired endurance, orthopedic precautions, decreased lower extremity function, pain, impaired functional mobilty. Pt s/p ORIF L hip 9-13-18 now with impaired functional mobility. 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     Rehab Prognosis:  excellent; patient would benefit from acute skilled PT services to address these deficits and reach maximum level of function.      Recent Surgery: Procedure(s) (LRB):  ORIF, HIP (Left) Day of Surgery    Plan:     During this hospitalization, patient to be seen daily to address the above listed problems via gait training, therapeutic activities, therapeutic exercises  · Plan of Care Expires:  10/13/18   Plan of Care Reviewed with: patient    Subjective     Communicated with patient prior to session.  Patient found in bed supine upon PT entry to room, agreeable to evaluation.      Chief Complaint: L hip pain  Patient comments/goals: to go home  Pain/Comfort:  · Pain Rating 1: 8/10  · Location - Side 1: Left  · Location 1: hip  · Pain Addressed 1: Pre-medicate for activity, Distraction  · Pain Rating Post-Intervention 1: 7/10    Patients cultural, spiritual, Jew conflicts given the current situation: none mentioned    Living Environment:  Lives with step daughter and father in SS house with 1 ИВАН )0 rail.  Has tub with tub bench.   Prior to admission, patients level of function was mod I using rollator  and power scooter.  Patient has the following equipment: bath bench, rollator, power chair.  DME owned (not currently used): none.  Upon discharge, patient will have assistance from father and step daughter.    Objective:     Patient found with: peripheral IV, oxygen     General Precautions: Standard, fall   Orthopedic Precautions:LLE toe touch weight bearing   Braces: N/A     Exams:  · Cognitive Exam:  Patient is oriented to Person, Place, Time and Situation  · Fine Motor Coordination:    · -       Impaired  LLE heel shin -  · Gross Motor Coordination:  WFL  · Postural Exam:  Patient presented with the following abnormalities:    · -       Rounded shoulders  · -       Forward head  · Sensation:    · -       Intact  · Skin Integrity/Edema:      · -       Skin integrity: Visible skin intact  · -       Edema: Mild L LE  · RLE ROM: WNL  · RLE Strength: WNL  · LLE ROM: WFL except limited by pain and ortho precautions  · LLE Strength: WFL except limited by pain and ortho precautions    Functional Mobility:  · Bed Mobility:     · Supine to Sit: maximal assistance  · Transfers:     · Sit to Stand:  moderate assistance with rolling walker  · Gait: 3 sidesteps EOB with TTWB and RW and min A  · Balance: fair standin dynamic balance    AM-PAC 6 CLICK MOBILITY  Total Score:12       Patient left supine with all lines intact, call button in reach, bed alarm on, nurse notified and family present.    GOALS:   Multidisciplinary Problems     Physical Therapy Goals        Problem: Physical Therapy Goal    Goal Priority Disciplines Outcome Goal Variances Interventions   Physical Therapy Goal     PT, PT/OT      Description:  Goals to be met by 9-21-18.  1. Sup<>sit mod I  2. Sit<>stand with RW supervision  3. amb 50' with RW TTWB L supervision  4. Up/down curb steps RW TTWB L mod I                    History:     Past Medical History:   Diagnosis Date    Abnormal finding on Pap smear, HPV DNA positive 2014    Anemia associated with  chronic renal failure     on Epogen    Blood type B+     Bulging discs - symptomatic      CAD (coronary artery disease)     Diabetes mellitus, type 2     ESRD (end stage renal disease) 2004    FSGS (focal segmental glomerulosclerosis)     with collapsing    Hyperlipidemia     Hypertension     Neuropathy     Obesity     Secondary hyperparathyroidism, renal     TIA (transient ischemic attack)     Uterine fibroid     small uterine        Past Surgical History:   Procedure Laterality Date    CARDIAC CATHETERIZATION      PCI x 2     SECTION, CLASSIC      DEBRIDEMENT-WOUND Left 2016    [facility] OR    DIALYSIS FISTULA CREATION      multiple fistulas and grafts before PD     INCISION AND DRAINAGE (I&D), LABIAL N/A 2016    [facility] by Harm[facility] Fernandez [facility] at [facility] OR    INCISION AND DRAINAGE (I&D), LABIAL (ADD ON) Left 2016    [facility] at [facility] OR    INCISION AND DRAINAGE OF WOUND Left     VULVAR ABCESS WITH NECROSIS    PERITONEAL CATHETER INSERTION      PERMCATH REWIRE- TUNNELED CATH REWIRE Left 2017    Performed [facility] Baldev Munroe [facility] at [facility] CATH LAB    PERMCATH REWIRE- TUNNELED CATH REWIRE N/A 10/5/2017    [facility] at [facility] CATH LAB    UMBILICAL HERNIA REPAIR         Clinical Decision Making:     History  Co-morbidities and personal factors that may impact the plan of care Examination  Body Structures and Functions, activity limitations and participation restrictions that may impact the plan of care Clinical Presentation   Decision Making/ Complexity Score   Co-morbidities:   [] Time since onset of injury / illness / exacerbation  [] Status of current condition  []Patient's cognitive status and safety concerns    [] Multiple Medical Problems (see med hx)  Personal Factors:   [] Patient's age  [] Prior Level of function   [] Patient's home situation (environment and family  support)  [] Patient's level of motivation  [] Expected progression of patient      HISTORY:(criteria)    [] 46373 - no personal factors/history    [] 58368 - has 1-2 personal factor/comorbidity     [] 31346 - has >3 personal factor/comorbidity     Body Regions:  [] Objective examination findings  [] Head     []  Neck  [] Trunk   [] Upper Extremity  [] Lower Extremity    Body Systems:  [] For communication ability, affect, cognition, language, and learning style: the assessment of the ability to make needs known, consciousness, orientation (person, place, and time), expected emotional /behavioral responses, and learning preferences (eg, learning barriers, education  needs)  [] For the neuromuscular system: a general assessment of gross coordinated movement (eg, balance, gait, locomotion, transfers, and transitions) and motor function  (motor control and motor learning)  [] For the musculoskeletal system: the assessment of gross symmetry, gross range of motion, gross strength, height, and weight  [] For the integumentary system: the assessment of pliability(texture), presence of scar formation, skin color, and skin integrity  [] For cardiovascular/pulmonary system: the assessment of heart rate, respiratory rate, blood pressure, and edema     Activity limitations:    [] Patient's cognitive status and saf ety concerns          [] Status of current condition      [] Weight bearing restriction  [] Cardiopulmunary Restriction    Participation Restrictions:   [] Goals and goal agreement with the patient     [] Rehab potential (prognosis) and probable outcome      Examination of Body System: (criteria)    [] 12283 - addressing 1-2 elements    [] 35309 - addressing a total of 3 or more elements     [] 96872 -  Addressing a total of 4 or more elements         Clinical Presentation: (criteria)  Choose one     On examination of body system using standardized tests and measures patient presents with (CHOOSE ONE) elements from  any of the following: body structures and functions, activity limitations, and/or participation restrictions.  Leading to a clinical presentation that is considered (CHOOSE ONE)                              Clinical Decision Making  (Eval Complexity):  Choose One     Time Tracking:     PT Received On: 09/13/18  PT Start Time: 1602     PT Stop Time: 1639  PT Total Time (min): 37 min     Billable Minutes: Evaluation 22 and Gait Training 15      Heraclio Valdes, PT  09/13/2018

## 2018-09-13 NOTE — ANESTHESIA POSTPROCEDURE EVALUATION
"Anesthesia Post Evaluation    Patient: Jenni Todd    Procedure(s) Performed: Procedure(s) (LRB):  ORIF, HIP (Left)    Final Anesthesia Type: general  Patient location during evaluation: PACU  Patient participation: Yes- Able to Participate  Level of consciousness: awake and alert  Post-procedure vital signs: reviewed and stable  Pain management: adequate  Airway patency: patent  PONV status at discharge: No PONV  Anesthetic complications: no      Cardiovascular status: blood pressure returned to baseline  Respiratory status: unassisted, spontaneous ventilation and room air  Hydration status: euvolemic  Follow-up not needed.        Visit Vitals  BP (!) 118/55 (BP Location: Left arm, Patient Position: Lying)   Pulse 91   Temp 37.6 °C (99.7 °F) (Oral)   Resp 16   Ht 5' 7" (1.702 m)   Wt 99.8 kg (219 lb 16 oz)   LMP 01/28/2014 (Approximate)   SpO2 (!) 92%   Breastfeeding? No   BMI 34.46 kg/m²       Pain/Yuridia Score: Pain Assessment Performed: Yes (9/13/2018 12:50 PM)  Presence of Pain: denies (9/13/2018 12:50 PM)  Pain Rating Prior to Med Admin: 3 (9/13/2018 12:40 AM)  Pain Rating Post Med Admin: 4 (9/12/2018  2:41 AM)  Yuridia Score: 9 (9/13/2018 12:50 PM)        "

## 2018-09-13 NOTE — SUBJECTIVE & OBJECTIVE
Interval History: The patient has some complaints of left hip pain    Review of Systems  Objective:     Vital Signs (Most Recent):  Temp: 99.7 °F (37.6 °C) (09/13/18 1342)  Pulse: 91 (09/13/18 1343)  Resp: 16 (09/13/18 1343)  BP: (!) 118/55 (09/13/18 1342)  SpO2: (!) 92 % (09/13/18 1343) Vital Signs (24h Range):  Temp:  [98.6 °F (37 °C)-101.1 °F (38.4 °C)] 99.7 °F (37.6 °C)  Pulse:  [] 91  Resp:  [16-20] 16  SpO2:  [87 %-97 %] 92 %  BP: (101-184)/(53-86) 118/55     Weight: 99.8 kg (219 lb 16 oz)  Body mass index is 34.46 kg/m².    Intake/Output Summary (Last 24 hours) at 9/13/2018 1405  Last data filed at 9/13/2018 1301  Gross per 24 hour   Intake 500 ml   Output 0 ml   Net 500 ml      Physical Exam   Constitutional: She is oriented to person, place, and time. She appears well-developed and well-nourished.   Pleasant, obese female in NAD cooperative with exam   HENT:   Head: Normocephalic and atraumatic.   Mouth/Throat: Oropharynx is clear and moist.   Eyes: Conjunctivae are normal. Right eye exhibits no discharge. Left eye exhibits no discharge.   Neck: Normal range of motion. Neck supple.   Cardiovascular: Regular rhythm, normal heart sounds and intact distal pulses. Tachycardia present.   Pulses:       Radial pulses are 2+ on the right side, and 2+ on the left side.   Pulmonary/Chest: Effort normal and breath sounds normal. No respiratory distress.   Abdominal: Soft. Bowel sounds are normal. She exhibits no distension. There is no tenderness.   Musculoskeletal: She exhibits edema.        Left hip: She exhibits decreased range of motion, tenderness and swelling.   Neurological: She is alert and oriented to person, place, and time. She has normal strength. GCS eye subscore is 4. GCS verbal subscore is 5. GCS motor subscore is 6.   Skin: Skin is warm and dry.   Psychiatric: She has a normal mood and affect. Her speech is normal and behavior is normal. Thought content normal.       Significant Labs:   CBC:    Recent Labs   Lab  09/11/18   2312  09/12/18   0507  09/13/18   0521   WBC  11.59  12.52  11.73   HGB  7.1*  8.5*  7.6*   HCT  21.5*  26.0*  23.0*   PLT  417*  472*  402*     CMP:   Recent Labs   Lab  09/12/18   0004  09/12/18   0508  09/13/18   0521   NA  135*  136  134*   K  3.9  4.2  4.1   CL  95  94*  93*   CO2  21*  20*  23   GLU  156*  153*  169*   BUN  59*  62*  64*   CREATININE  13.9*  15.0*  14.8*   CALCIUM  9.1  9.4  9.1   PROT   --   7.8  7.3   ALBUMIN   --   2.9*  2.5*   BILITOT   --   0.5  0.6   ALKPHOS   --   152*  121   AST   --   19  202*   ALT   --   17  191*   ANIONGAP  19*  22*  18*   EGFRNONAA  3*  3*  3*     Magnesium:   Recent Labs   Lab  09/12/18   0508  09/13/18   0521   MG  1.8  1.7       Significant Imaging: I have reviewed and interpreted all pertinent imaging results/findings within the past 24 hours.

## 2018-09-13 NOTE — PROGRESS NOTES
Pt transferred from PACU via bed AAOX4 on 3L O2 NC. Pt denies pain at this time. Dressing to L hip CDI with abd pad and medipore tape. Cardiac monitor applied. Will continue to monitor.

## 2018-09-13 NOTE — PLAN OF CARE
Problem: Physical Therapy Goal  Goal: Physical Therapy Goal  Goals to be met by 9-21-18.  1. Sup<>sit mod I  2. Sit<>stand with RW supervision  3. amb 50' with RW TTWB L supervision  4. Up/down curb steps RW TTWB L mod I  -    Comments: Physical therapy eval completed.  Recommend home with  PT. Will need RW for home use.

## 2018-09-13 NOTE — BRIEF OP NOTE
[facility]  Brief Operative Note    SUMMARY     Surgery Date: 9/13/2018     Surgeon(s) and Role:     * Tevin Grullon MD - Primary    Assisting Surgeon: None    Pre-op Diagnosis:  Closed fracture of left hip, initial encounter [S72.002A]    Post-op Diagnosis:  Post-Op Diagnosis Codes:     * Closed fracture of left hip, initial encounter [S72.002A]    Procedure(s) (LRB):  ORIF, HIP (Left)    Anesthesia: Choice    Description of Procedure:  ORIF left femoral neck Ace 6.5    Description of the findings of the procedure:  Nondisplaced left femoral neck fracture    Estimated Blood Loss: * No values recorded between 9/13/2018 11:08 AM and 9/13/2018 11:42 AM *20         Specimens:   Specimen (12h ago, onward)    None

## 2018-09-13 NOTE — ANESTHESIA PREPROCEDURE EVALUATION
09/13/2018  Jenni Todd is a 48 y.o., female.    Anesthesia Evaluation    I have reviewed the Patient Summary Reports.    I have reviewed the Nursing Notes.   I have reviewed the Medications.     Review of Systems  Anesthesia Hx:  No problems with previous Anesthesia  History of prior surgery of interest to airway management or planning: Denies Family Hx of Anesthesia complications.   Denies Personal Hx of Anesthesia complications.   Social:  Non-Smoker    Hematology/Oncology:     Oncology Normal    -- Anemia: Anemia of Chronic Kidney Disease  Chronic    EENT/Dental:EENT/Dental Normal   Cardiovascular:   Exercise tolerance: poor Hypertension Past MI CAD  CABG/stent  Stents 2005   Pulmonary:  Pulmonary Normal    Renal/:   Chronic Renal Disease, ESRD, Dialysis Peritomeal dialysys   Musculoskeletal:  Spine Disorders: lumbar Chronic Pain and Disc disease    Neurological:   TIA, CVA, no residual symptoms Headaches Uses scooter to get around  Peripheral Neuropathy    Endocrine:   Diabetes, using insulin    Dermatological:  Skin Normal    Psych:  Psychiatric Normal           Physical Exam  General:  Obesity    Airway/Jaw/Neck:  Airway Findings: Mouth Opening: Normal Tongue: Normal  General Airway Assessment: Adult  Mallampati: I  TM Distance: Normal, at least 6 cm  Jaw/Neck Findings:  Neck ROM: Normal ROM      Dental:  Dental Findings: In tact             Anesthesia Plan  Type of Anesthesia, risks & benefits discussed:  Anesthesia Type:  general  Patient's Preference:   Intra-op Monitoring Plan:   Intra-op Monitoring Plan Comments:   Post Op Pain Control Plan:   Post Op Pain Control Plan Comments:   Induction:   IV  Beta Blocker:         Informed Consent: Patient understands risks and agrees with Anesthesia plan.  Questions answered. Anesthesia consent signed with patient.  ASA Score: 3     Day of Surgery  Review of History & Physical:    H&P update referred to the surgeon.         Ready For Surgery From Anesthesia Perspective.
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
## 2018-09-13 NOTE — INTERVAL H&P NOTE
The patient has been examined and the H&P has been reviewed:    I concur with the findings and no changes have occurred since H&P was written.    Anesthesia/Surgery risks, benefits and alternative options discussed and understood by patient/family.          Active Hospital Problems    Diagnosis  POA    *Closed fracture of left hip [S72.002A]  Yes    ESRD (end stage renal disease) on dialysis [N18.6, Z99.2]  Not Applicable    Metabolic acidosis, increased anion gap [E87.2]  Yes    Type 2 diabetes 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 [E11.22, N18.6, Z99.2]  Not Applicable    Essential hypertension [I10]  Yes    Hyperlipidemia [E78.5]  Yes      Resolved Hospital Problems   No resolved problems to display.

## 2018-09-13 NOTE — PLAN OF CARE
Problem: Patient Care Overview  Goal: Plan of Care Review  Outcome: Ongoing (interventions implemented as appropriate)  Pt remains free from injury or falls. Blood pressure monitored throughout night, tylenol administered for temperature of 101.1 at 0015. Positions with assist. NPO after midnight. No complaints of pain or nausea. Peritoneal dialysis in use. Bed alarm set, bed in low locked position and call light within reach.  Will continue to monitor.

## 2018-09-13 NOTE — PLAN OF CARE
Problem: Patient Care Overview  Goal: Plan of Care Review  Outcome: Ongoing (interventions implemented as appropriate)  Patient on 3L NC saturations 92% with no reported distress,increased to 3.5L SPO2 95% will wean as tolerated.

## 2018-09-14 NOTE — PLAN OF CARE
Problem: Patient Care Overview  Goal: Plan of Care Review  Outcome: Ongoing (interventions implemented as appropriate)  Pt AAOX4. VSS on 3L O2 NC. Pt free of trauma, falls, injury and skin breakdown.  Pt denies pain at this time. Pt appetite fair. Pt bilateral lower extremities pulses intact.Dressing to L hip CDI.Blood glucose monitoring maintained.Fluids/IS encouraged throughout shift. Pt encouraged to reposition self frequently while in bed. PT/OT this shift (see notes). PD completed this shift; pt to start again at 9PM. Purposeful hourly rounding.Pt has call light in reach, side rails up X2, bed in low position, TEDs/SCDs and nonskid socks on. Pt lying in bed in no distress with family at the bedside.

## 2018-09-14 NOTE — ADDENDUM NOTE
Addendum  created 09/14/18 0723 by Kimmie Rosales CRNA    Intraprocedure LDAs edited, LDA properties accepted

## 2018-09-14 NOTE — PLAN OF CARE
Problem: Patient Care Overview  Goal: Plan of Care Review  Outcome: Ongoing (interventions implemented as appropriate)  Patient on 3L nc. Sats 96%. IS done. Pt. in no distress, will continue to monitor.

## 2018-09-14 NOTE — PLAN OF CARE
Problem: Occupational Therapy Goal  Goal: Occupational Therapy Goal  Goals to be met by: 10/14/18     Patient will increase functional independence with ADLs by performing:    LE Dressing with Moderate Assistance and Assistive Devices as needed.  Grooming while standing at sink with Contact Guard Assistance.  Toileting from bedside commode with Minimal Assistance for hygiene and clothing management.   Supine to sit with Moderate Assistance.  Stand pivot transfers with Contact Guard Assistance and maintaining weight-bearing precaution(s).  Step transfer with Contact Guard Assistance and maintaining weight-bearing precaution(s)  Toilet transfer to bedside commode with Contact Guard Assistance.    Outcome: Ongoing (interventions implemented as appropriate)  OT evaluation completed and treatment initiated.  Pt would benefit from skilled occupational therapy intervention for increased activity tolerance and independence in ADL's. Recommending inpatient rehab upon discharge.

## 2018-09-14 NOTE — PROGRESS NOTES
SW met with pt and gave list of IRFs; pt selected [facility] and [facility] and signed choice form.  SW sent referral via [facility].

## 2018-09-14 NOTE — ASSESSMENT & PLAN NOTE
Supplemental oxygen for saturations >93%  Continue IS Q1 while awake  OOB to chair  Suspect secondary to ateletasis

## 2018-09-14 NOTE — PROGRESS NOTES
Postop day 1 pinning left hip.  So far doing well. 10 lb on the left.  Probably 2 months.  Walker and her electric scooter long-term.

## 2018-09-14 NOTE — PLAN OF CARE
Problem: Physical Therapy Goal  Goal: Physical Therapy Goal  Goals to be met by 18.    Patient will increase functional independence with mobility by performin. Suine<>sit min A  2. Sit<>stand with RW and CGA  3. Gait > 50' with RW and CGA with L LE TTWB  4. Up/down curb step with RW with L TTWB and min A.    Outcome: Ongoing (interventions implemented as appropriate)    Patient required Total A from bedside chair (low) surface and unable to maintain TTWB.
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
## 2018-09-14 NOTE — PLAN OF CARE
Problem: Patient Care Overview  Goal: Plan of Care Review  Outcome: Ongoing (interventions implemented as appropriate)  VSS. Pt slept off and on throughout shift. Pt free of trauma, falls, injury and skin breakdown.  Pt denies pain at this time. Pt appetite fair. Pt bilateral lower extremities pulses intact.Dressing to L hip CDI. IV fluids infusing per MD order. Treated with IV Abx.Blood glucose monitoring maintained.Fluids/IS encouraged throughout shift. Pt encouraged to reposition self frequently while in bed. PT this shift (see note). Purposeful hourly rounding.Pt has call light in reach, side rails up X2, bed in low position, TEDs/SCDs and nonskid socks on. Pt lying in bed in no distress with family at the bedside.

## 2018-09-14 NOTE — PT/OT/SLP PROGRESS
Physical Therapy Treatment    Patient Name:  Jenni Todd   MRN:  [id number]    Recommendations:     Discharge Recommendations:  rehabilitation facility   Discharge Equipment Recommendations: (TBD )   Barriers to discharge: Inaccessible home and Decreased caregiver support patient lives alone and requires increase assistance at this time for functional mobility; TTWB LLE.    Assessment:     Jenni Todd is a 48 y.o. female admitted with a medical diagnosis of Closed fracture of left hip with routine healing.  She presents with the following impairments/functional limitations:  weakness, impaired endurance, impaired self care skills, impaired balance, gait instability, impaired functional mobilty, decreased safety awareness, pain, decreased ROM, edema, orthopedic precautions patient was lethargic, required total A of 2 people to return to bed from bedside chair (low surface). Patient is unable to maintain TTWB LLE at this time with transfers. Patient was working full time and living home independently prior to this admit.     Despite co-morbidities patient was modified independent at home prior to this event for locomotion  ADLs, and IADLs  and there is expectation of returning to prior level of function to maintain independence avoiding readmission. 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     Pt's clinical condition meets full inpatient rehab criteria. Inpatient rehab is necessary to provide to total interdisciplinary treatment approach needed. Pt is at high risk of unplanned readmission due to fall risk, inability to self-administer medication, and lack of 24 hour caregiver in prior setting.      Pt is able to tolerate 3 hours of daily therapy.      Rehab Prognosis:  good; patient would benefit from acute skilled PT services to address these deficits and reach maximum level of function.      Recent Surgery: Procedure(s) (LRB):  ORIF, HIP (Left) 1 Day Post-Op    Plan:  "    During this hospitalization, patient to be seen daily to address the above listed problems via gait training, therapeutic activities, therapeutic exercises  · Plan of Care Expires:  10/13/18   Plan of Care Reviewed with: patient    Subjective     Communicated with nurse prior to session.  Patient found supine in bedside chair upon PT entry to room, agreeable to treatment.  Patient stated " I'm afraid I'm going to have pain", patient also reported my arms weren't that strong before this.     Chief Complaint: pain with mobility   Patient comments/goals:get back to work   Pain/Comfort:  · Pain Rating 1: 5/10  · Location - Side 1: Left  · Location - Orientation 1: generalized  · Location 1: hip  · Pain Addressed 1: Pre-medicate for activity, Reposition, Cessation of Activity  · Pain Rating Post-Intervention 1: 0/10(at rest in bed )    Patients cultural, spiritual, Hoahaoism conflicts given the current situation: none mentioned    Objective:     Patient found with: peripheral IV, telemetry(peritoneal dialysis, oxygen 4L @100%) Nurse was aware that oxygen saturation was 100% on 4L     General Precautions: Standard, fall   Orthopedic Precautions:LLE toe touch weight bearing   Braces: N/A     Functional Mobility:  · Sit to stand from bedside chair (low surface) was attempt X 3 trials and was unsuccessful; patient was unable to elevate hips and maintain TTWB.   · SQPT from bedside chair to bed with Total A of 2 people.   · Sit to supine with moderate assistance for bilateral LE management.   · Sit to stand from bed with using bilateral HR with moderate A of 2 people.       AM-PAC 6 CLICK MOBILITY  Turning over in bed (including adjusting bedclothes, sheets and blankets)?: 2  Sitting down on and standing up from a chair with arms (e.g., wheelchair, bedside commode, etc.): 2  Moving to and from a bed to a chair (including a wheelchair)?: 1  Need to walk in hospital room?: 1  Climbing 3-5 steps with a railing?: " 1       Therapeutic Activities and Exercises:  Patient was educated and performed 10 reps of IS   AP, QS, LAQ LLE X 20 reps AAROM for LAQ's   Educated patient on importance of being out of bed; possibly using a slide board in order to perform next transfers.   Spoke to ortho RN, per Dr. Grullon patient needs to perform transfer with TTWB     Patient left bed in chair position with nurse present and notified. Patient was also going to get orders for CPAP machine at night.     GOALS:   Multidisciplinary Problems     Physical Therapy Goals        Problem: Physical Therapy Goal    Goal Priority Disciplines Outcome Goal Variances Interventions   Physical Therapy Goal     PT, PT/OT Ongoing (interventions implemented as appropriate)     Description:  Goals to be met by 18.    Patient will increase functional independence with mobility by performin. Suine<>sit min A  2. Sit<>stand with RW and CGA  3. Gait > 50' with RW and CGA with L LE TTWB  4. Up/down curb step with RW with L TTWB and min A.                     Time Tracking:     PT Received On: 18  PT Start Time: 1315     PT Stop Time: 1425  PT Total Time (min): 70 min     Billable Minutes: TA 70,     Treatment Type: Treatment  PT/PTA: PTA     PTA Visit Number: 1     Geri Morin, PTA  2018

## 2018-09-14 NOTE — SUBJECTIVE & OBJECTIVE
Interval History: The patient has some excessive somnolence so ABG done which revealed some hypoxia.  The patient is encouraged to be up in a chair, continue IS Q1 while awake, and participate with therapy.    Review of Systems   Musculoskeletal: Positive for arthralgias.        Buttock pain     Objective:     Vital Signs (Most Recent):  Temp: 99.1 °F (37.3 °C) (09/14/18 1159)  Pulse: 91 (09/14/18 1159)  Resp: 20 (09/14/18 1159)  BP: 120/65 (09/14/18 1159)  SpO2: 100 % (09/14/18 1159) Vital Signs (24h Range):  Temp:  [98.3 °F (36.8 °C)-99.1 °F (37.3 °C)] 99.1 °F (37.3 °C)  Pulse:  [89-95] 91  Resp:  [16-20] 20  SpO2:  [90 %-100 %] 100 %  BP: ()/(52-79) 120/65     Weight: 99.8 kg (219 lb 16 oz)  Body mass index is 34.46 kg/m².    Intake/Output Summary (Last 24 hours) at 9/14/2018 1414  Last data filed at 9/14/2018 0600  Gross per 24 hour   Intake 995 ml   Output 742 ml   Net 253 ml      Physical Exam   Constitutional: She is oriented to person, place, and time. She appears well-developed and well-nourished.   Pleasant, obese female in NAD cooperative with exam   HENT:   Head: Normocephalic and atraumatic.   Mouth/Throat: Oropharynx is clear and moist.   Eyes: Conjunctivae are normal. Right eye exhibits no discharge. Left eye exhibits no discharge.   Neck: Normal range of motion. Neck supple.   Cardiovascular: Regular rhythm, normal heart sounds and intact distal pulses. Tachycardia present.   Pulses:       Radial pulses are 2+ on the right side, and 2+ on the left side.   Pulmonary/Chest: Effort normal and breath sounds normal. No respiratory distress.   Abdominal: Soft. Bowel sounds are normal. She exhibits no distension. There is no tenderness.   Musculoskeletal: She exhibits edema.        Left hip: She exhibits decreased range of motion, tenderness and swelling.   Neurological: She is alert and oriented to person, place, and time. She has normal strength. GCS eye subscore is 4. GCS verbal subscore is 5. GCS  motor subscore is 6.   Skin: Skin is warm and dry.   Psychiatric: She has a normal mood and affect. Her speech is normal and behavior is normal. Thought content normal.       Significant Labs:   CBC:   Recent Labs   Lab  09/13/18 0521 09/13/18 2337  09/14/18 0426   WBC  11.73  12.12  11.85   HGB  7.6*  7.0*  7.4*   HCT  23.0*  21.5*  21.7*   PLT  402*  347  346     CMP:   Recent Labs   Lab  09/13/18 0521 09/14/18 0426   NA  134*  132*   K  4.1  5.0   CL  93*  92*   CO2  23  20*   GLU  169*  218*   BUN  64*  75*   CREATININE  14.8*  15.7*   CALCIUM  9.1  7.9*   PROT  7.3  7.0   ALBUMIN  2.5*  2.3*   BILITOT  0.6  0.5   ALKPHOS  121  109   AST  202*  159*   ALT  191*  171*   ANIONGAP  18*  20*   EGFRNONAA  3*  2*     Magnesium:   Recent Labs   Lab  09/13/18 0521 09/14/18 0426   MG  1.7  1.7       Significant Imaging: I have reviewed and interpreted all pertinent imaging results/findings within the past 24 hours.

## 2018-09-14 NOTE — PLAN OF CARE
Problem: Patient Care Overview  Goal: Plan of Care Review  Outcome: Ongoing (interventions implemented as appropriate)  Patient is s/p Left hip ORIF POD#1. Very somnolent during this shift, but AAOx4. Unable to wean off of 3 L o2 n/c - re-evaluate h/h in am. Afebrile; scheduled antibiotics given. Blood glucose levels maintained with ac/hs accucheck and prn SSI. Pain managed effectively with prn Norco 5 mg PO x1. Patient has remained in bed sleeping during this shift. PD at bedside initiated by patient and continuing to infuse.

## 2018-09-14 NOTE — PLAN OF CARE
Problem: Physical Therapy Goal  Goal: Physical Therapy Goal  Goals to be met by 18.    Patient will increase functional independence with mobility by performin. Suine<>sit min A  2. Sit<>stand with RW and CGA  3. Gait > 50' with RW and CGA with L LE TTWB  4. Up/down curb step with RW with L TTWB and min A.   Outcome: Revised  Goals revised as above.

## 2018-09-14 NOTE — ASSESSMENT & PLAN NOTE
Unsure of etiology  Removed any potentially harmful medications to the liver  Improving and will continue to monitor

## 2018-09-14 NOTE — PT/OT/SLP EVAL
Occupational Therapy   Evaluation and Treatment    Name: Jenni Todd  MRN: [id number]  Admitting Diagnosis:  Closed fracture of left hip with routine healing 1 Day Post-Op    Recommendations:     Discharge Recommendations: rehabilitation facility  Discharge Equipment Recommendations:  (TBD )  Barriers to discharge:  (increased caregiver burden)    History:     Occupational Profile:  Living Environment: Pt lives with her step daughter and her father lives nearby and is often at her house. She has a 1 story house with 1 ИВАН. Pt has a tub/shower combo with a shower chair she uses for seated bathing.   Previous level of function: Pt reports being MOD I for ADL's, uses a rollator for functional mobility for household distances and her scooter for community distances and at work. Pt reports she will typically purchase her meal at work before leaving for dinner so she doesn't have to cook much.   Roles and Routines: Pt works at the [employer].   Equipment Used at Home:  shower chair, rollator(power chair/scooter)  Assistance upon Discharge: Pt states someone will be home during the day although her father is not always able to assist physically as much.     Past Medical History:   Diagnosis Date    Abnormal finding on Pap smear, HPV DNA positive     Anemia associated with chronic renal failure     on Epogen    Blood type B+     Bulging discs - symptomatic      CAD (coronary artery disease)     Diabetes mellitus, type 2     ESRD (end stage renal disease) 2004    FSGS (focal segmental glomerulosclerosis)     with collapsing    Hyperlipidemia     Hypertension     Neuropathy     Obesity     Secondary hyperparathyroidism, renal     TIA (transient ischemic attack)     Uterine fibroid     small uterine          Past Surgical History:   Procedure Laterality Date    CARDIAC CATHETERIZATION      PCI x 2     SECTION, CLASSIC      DEBRIDEMENT-WOUND Left 2016    Performed by Teressa ROLDAN  MD [facility] at [facility] OR    DIALYSIS FISTULA CREATION      multiple fistulas and grafts before PD     INCISION AND DRAINAGE (I&D), LABIAL N/A 4/17/2016    Performed by Harmony Fernandez MD at [facility] OR    INCISION AND DRAINAGE (I&D), LABIAL (ADD ON) Left 4/18/2016    Performed by Teressa Maddox MD at [facility] OR    INCISION AND DRAINAGE OF WOUND Left 2016    VULVAR ABCESS WITH NECROSIS    OPEN REDUCTION AND INTERNAL FIXATION (ORIF) OF INJURY OF HIP Left 9/13/2018    Procedure: ORIF, HIP;  Surgeon: Tevin Grullon MD;  Location: [facility] OR;  Service: Orthopedics;  Laterality: Left;    ORIF, HIP Left 9/13/2018    Performed by [facility]evin Grullon MD at [facility] OR    PERITONEAL CATHETER INSERTION      PERMCATH REWIRE- TUNNELED CATH REWIRE Left 11/13/2017    Performed by Baldev Munroe MD at [facility] CATH LAB    PERMCATH REWIRE- TUNNELED CATH REWIRE N/A 10/5/2017    Performed by Baldev Munroe MD at [facility] CATH LAB    UMBILICAL HERNIA REPAIR         Subjective     Chief Complaint: decreased mobility, low UE strength  Patient/Family Comments/goals: to return to PLOF    Pain/Comfort:  Pain Rating 1: 2/10  Location - Side 1: Left  Location - Orientation 1: generalized  Location 1: hip  Pain Addressed 1: Reposition, Distraction, Nurse notified  Pain Rating Post-Intervention 1: 4/10    Patients cultural, spiritual, [employer]ybeterian conflicts given the current situation: none specified    Objective:     Communicated with: nursing prior to session.  Patient found bed alarm on and father present and peripheral IV, oxygen(6L via NC, peritoneal dialysis) upon OT entry to room.    General Precautions: Standard, fall   Orthopedic Precautions:LLE toe touch weight bearing   Braces: N/A     Occupational Performance:    Bed Mobility:    · Patient completed Supine to Sit with total assistance and with side rail HOB slightly elevated. Pt required assistance at trunk and LLE with increased time.     Functional Mobility/Transfers:  · Patient  completed Sit <> Stand Transfer with moderate assistance  with  rolling walker   · Patient completed Bed <> Chair Transfer using Step Transfer technique with moderate assistance from bed slightly elevated with rolling walker increased time and verbal cues for weight bearing precautions and walker management     Activities of Daily Living:  · Lower Body Dressing: total assistance donning socks    Cognitive/Visual Perceptual:  Cognitive/Psychosocial Skills:     -       Oriented to: Person, Place and Time   -       Follows Commands/attention:Follows two-step commands  -       Communication: clear/fluent  -       Memory: Poor immediate recall  -       Safety awareness/insight to disability: impaired   -       Mood/Affect/Coping skills/emotional control: Appropriate to situation    Physical Exam:  Postural examination/scapula alignment:    -       Rounded shoulders  -       Forward head  Skin integrity: Visible skin intact  Edema:  none noted BUE's  Sensation:    -       Impaired  pt reports neuropathy in BUE's    Motor Planning:    -       fair  Dominant hand:    -       Right  Upper Extremity Range of Motion:     -       Right Upper Extremity: WFL  -       Left Upper Extremity: WFL    Upper Extremity Strength:    -       Right Upper Extremity: 4/5  -       Left Upper Extremity: 4/5    Strength:    -       Right Upper Extremity: 4/5   -       Left Upper Extremity: 4/5     AMPAC 6 Click ADL:  AMPAC Total Score: 15    Treatment & Education:  Pt required Total A for bed mobility supine > sit with HOB slightly elevated, use of bed rail and increased time to break up task (LLE and assistance at trunk). Educated pt on OT role and POC.    Returned with PTA to attempt sit> stand transfer MAX A x2 from bedside chair. Pt with decreased endurance and ability to maintain weight bearing status.   Education:    Patient left up in chair with call button in reach, nursing notified and father present    Assessment:     Jenni Telles  "Peyton is a 48 y.o. female with a medical diagnosis of Closed fracture of left hip with routine healing.  She presents with the following performance deficits affecting function: weakness, impaired endurance, impaired functional mobilty, decreased safety awareness, gait instability, pain, decreased upper extremity function, decreased lower extremity function, impaired self care skills, orthopedic precautions.  OT evaluation completed and treatment initiated. Pt's clinical condition meets full inpatient rehab criteria. Inpatient rehab is necessary to provide to total interdisciplinary treatment approach needed. Pt was MOD I with all ADL's, works full time, and MOD I for functional mobility; completing all transfers independently. Pt is at high risk of unplanned readmission due to fall risk and lack of 24 hour caregiver assistance in prior setting.      Pt is able to tolerate 3 hours of daily therapy.        Rehab Prognosis: Good; patient would benefit from acute skilled OT services to address these deficits and reach maximum level of function.         Clinical Decision Makin.  OT Low:  "Pt evaluation falls under low complexity for evaluation coding due to performance deficits noted in 1-3 areas as stated above and 0 co-morbities affecting current functional status. Data obtained from problem focused assessments. No modifications or assistance was required for completion of evaluation. Only brief occupational profile and history review completed."     Plan:     Patient to be seen daily to address the above listed problems via self-care/home management, therapeutic activities, therapeutic exercises  · Plan of Care Expires: 10/14/18  · Plan of Care Reviewed with: patient    This Plan of care has been discussed with the patient who was involved in its development and understands and is in agreement with the identified goals and treatment plan    GOALS:   Multidisciplinary Problems     Occupational Therapy Goals  "       Problem: Occupational Therapy Goal    Goal Priority Disciplines Outcome Interventions   Occupational Therapy Goal     OT, PT/OT Ongoing (interventions implemented as appropriate)    Description:  Goals to be met by: 10/14/18     Patient will increase functional independence with ADLs by performing:    LE Dressing with Moderate Assistance and Assistive Devices as needed.  Grooming while standing at sink with Contact Guard Assistance.  Toileting from bedside commode with Minimal Assistance for hygiene and clothing management.   Supine to sit with Moderate Assistance.  Stand pivot transfers with Contact Guard Assistance and maintaining weight-bearing precaution(s).  Step transfer with Contact Guard Assistance and maintaining weight-bearing precaution(s)  Toilet transfer to bedside commode with Contact Guard Assistance.                      Time Tracking:     OT Date of Treatment: 09/14/18  OT Start Time: 1027  OT Stop Time: 1055  OT Total Time (min): 28 min    Billable Minutes:Evaluation 15  Therapeutic Activity 13    Phoenix Schneider OT  9/14/2018

## 2018-09-15 NOTE — SUBJECTIVE & OBJECTIVE
Interval History: The patient has complaints of left hip pain and the patient had an isolated temp elevation to 101.7F last pm.    Review of Systems   Musculoskeletal: Positive for arthralgias.        Buttock pain     Objective:     Vital Signs (Most Recent):  Temp: 99 °F (37.2 °C) (09/15/18 1100)  Pulse: 92 (09/15/18 1100)  Resp: 18 (09/15/18 1100)  BP: 121/83 (09/15/18 1100)  SpO2: 100 % (09/15/18 1100) Vital Signs (24h Range):  Temp:  [98 °F (36.7 °C)-101.6 °F (38.7 °C)] 99 °F (37.2 °C)  Pulse:  [] 92  Resp:  [17-20] 18  SpO2:  [95 %-100 %] 100 %  BP: ()/(49-83) 121/83     Weight: 99.8 kg (219 lb 16 oz)  Body mass index is 34.46 kg/m².    Intake/Output Summary (Last 24 hours) at 9/15/2018 1523  Last data filed at 9/14/2018 2200  Gross per 24 hour   Intake 120 ml   Output 853 ml   Net -733 ml      Physical Exam   Constitutional: She is oriented to person, place, and time. She appears well-developed and well-nourished.   Pleasant, obese female in NAD cooperative with exam   HENT:   Head: Normocephalic and atraumatic.   Mouth/Throat: Oropharynx is clear and moist.   Eyes: Conjunctivae are normal. Right eye exhibits no discharge. Left eye exhibits no discharge.   Neck: Normal range of motion. Neck supple.   Cardiovascular: Regular rhythm, normal heart sounds and intact distal pulses.   Pulses:       Radial pulses are 2+ on the right side, and 2+ on the left side.   Pulmonary/Chest: Effort normal and breath sounds normal. No respiratory distress.   Abdominal: Soft. Bowel sounds are normal. She exhibits no distension. There is no tenderness.   Musculoskeletal: She exhibits edema.        Left hip: She exhibits decreased range of motion, tenderness and swelling.   Neurological: She is alert and oriented to person, place, and time. She has normal strength. GCS eye subscore is 4. GCS verbal subscore is 5. GCS motor subscore is 6.   Skin: Skin is warm and dry.   Psychiatric: She has a normal mood and affect. Her  speech is normal and behavior is normal. Thought content normal.       Significant Labs:   CBC:   Recent Labs   Lab  09/13/18   2337  09/14/18   0426  09/15/18   0506   WBC  12.12  11.85  10.08   HGB  7.0*  7.4*  7.4*   HCT  21.5*  21.7*  22.9*   PLT  347  346  368*     CMP:   Recent Labs   Lab  09/14/18   0426  09/15/18   0506   NA  132*  133*   K  5.0  3.8   CL  92*  90*   CO2  20*  20*   GLU  218*  283*   BUN  75*  70*   CREATININE  15.7*  13.5*   CALCIUM  7.9*  8.1*   PROT  7.0  7.9   ALBUMIN  2.3*  2.6*   BILITOT  0.5  0.4   ALKPHOS  109  128   AST  159*  154*   ALT  171*  60*   ANIONGAP  20*  23*   EGFRNONAA  2*  3*     Magnesium:   Recent Labs   Lab  09/14/18   0426  09/15/18   0506   MG  1.7  1.7       Significant Imaging: I have reviewed and interpreted all pertinent imaging results/findings within the past 24 hours.

## 2018-09-15 NOTE — ASSESSMENT & PLAN NOTE
S/p left hip ORIF   PT/OT to continue with discharge planning in progress for inpatient rehab  Suspect fever secondary to atelectasis, continue IS and continue to monitor
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
## 2018-09-15 NOTE — PT/OT/SLP PROGRESS
Physical Therapy Treatment    Patient Name:  Jenni Todd   MRN:  [id number]    Recommendations:     Discharge Recommendations:  rehabilitation facility   Discharge Equipment Recommendations: none   Barriers to discharge: Decreased caregiver support    Assessment:     Jenni Todd is a 48 y.o. female admitted with a medical diagnosis of Closed fracture of left hip with routine healing.  She presents with the following impairments/functional limitations:  impaired functional mobilty, weakness, impaired balance, gait instability, impaired self care skills, decreased lower extremity function, pain, orthopedic precautions, impaired fine motor, impaired coordination.  Good participation in therapy pt sit<>stand with CGA.      Rehab Prognosis:  excellent; patient would benefit from acute skilled PT services to address these deficits and reach maximum level of function.      Recent Surgery: Procedure(s) (LRB):  ORIF, HIP (Left) 2 Days Post-Op    Plan:     During this hospitalization, patient to be seen daily to address the above listed problems via gait training, therapeutic activities, therapeutic exercises  · Plan of Care Expires:  10/13/18   Plan of Care Reviewed with: patient, family    Subjective     Communicated with patient and sister prior to session.  Patient found in bed supine upon PT entry to room, agreeable to treatment.      Chief Complaint: L hip pain  Patient comments/goals: feeling tired has not slept well.   Pain/Comfort:  · Pain Rating 1: 4/10  · Location - Side 1: Left  · Location - Orientation 1: generalized  · Location 1: hip  · Pain Addressed 1: Pre-medicate for activity, Distraction  · Pain Rating Post-Intervention 1: 6/10    Patients cultural, spiritual, Latter-day conflicts given the current situation: none mentioned    Objective:     Patient found with: peripheral IV, telemetry     General Precautions: Standard, fall   Orthopedic Precautions:LLE toe touch weight bearing   Braces: N/A      Functional Mobility:  · Bed Mobility:     · Supine to Sit: moderate assistance  · Sit to Supine: minimum assistance  · Transfers:     · Sit to Stand:  contact guard assistance and bed raised slightly with rolling walker  · Gait: 2 sidesteps EOB with RW TTWB L and min A  · Balance: F+ standing dynamic balance      AM-PAC 6 CLICK MOBILITY  Turning over in bed (including adjusting bedclothes, sheets and blankets)?: 3  Sitting down on and standing up from a chair with arms (e.g., wheelchair, bedside commode, etc.): 3  Moving from lying on back to sitting on the side of the bed?: 2  Moving to and from a bed to a chair (including a wheelchair)?: 1  Need to walk in hospital room?: 1  Climbing 3-5 steps with a railing?: 1  Basic Mobility Total Score: 11       Therapeutic Activities and Exercises:   in bed supine : ap x 20; slr with assist x 12; ab x 10 with assist    Patient left with bed in chair position with all lines intact, bed alarm, call button in reach and nurse notified.    GOALS:   Multidisciplinary Problems     Physical Therapy Goals        Problem: Physical Therapy Goal    Goal Priority Disciplines Outcome Goal Variances Interventions   Physical Therapy Goal     PT, PT/OT Ongoing (interventions implemented as appropriate)     Description:  Goals to be met by 18.    Patient will increase functional independence with mobility by performin. Suine<>sit min A  2. Sit<>stand with RW and CGA  3. Gait > 50' with RW and CGA with L LE TTWB  4. Up/down curb step with RW with L TTWB and min A.                     Time Tracking:     PT Received On: 09/15/18  PT Start Time: 1606     PT Stop Time: 1632  PT Total Time (min): 26 min     Billable Minutes: Gait Training 15 and Therapeutic Exercise 11    Treatment Type: Treatment  PT/PTA: PT     PTA Visit Number: 1     Heraclio Valdes, PT  09/15/2018

## 2018-09-15 NOTE — PT/OT/SLP PROGRESS
Occupational Therapy   Treatment    Name: Jenni Todd  MRN: [id number]  Admitting Diagnosis:  Closed fracture of left hip with routine healing  2 Days Post-Op s/p ORIF Left hip    Recommendations:     Discharge Recommendations: rehabilitation facility  Discharge Equipment Recommendations:  (TBD)  Barriers to discharge:  Decreased caregiver support    Subjective     Communicated with: patient and her nurse prior to session.  She was agreeable to OT treatment.  Pain/Comfort:  · Pain Rating 1: 0/10  · Location - Side 1: Left  · Location - Orientation 1: generalized  · Location 1: hip  · Pain Addressed 1: Pre-medicate for activity, Reposition, Cessation of Activity, Nurse notified  · Pain Rating Post-Intervention 1: 7/10  · Pain Rating 2: 7/10  · Location - Side 2: Bilateral  · Location - Orientation 2: lower  · Location 2: back  · Pain Addressed 2: Reposition, Nurse notified(trunk stretching exercise)  · Pain Rating Post-Intervention 2: 7/10    Patients cultural, spiritual, Zoroastrianism conflicts given the current situation: none    Objective:     Patient found with: peripheral IV, telemetry    General Precautions: Standard, fall   Orthopedic Precautions:LLE toe touch weight bearing   Braces: N/A     Occupational Performance:    Bed Mobility:    · Mod A supine>sit EOB  · Mod A sit>supine  · SBA scoot in sitting EOB     Functional Mobility/Transfers:        None    Activities of Daily Living:  · Set-Up Assist G/H (brush teeth, wash face and hands mouth wash)  · Supervision UBD (don hospital gown as harsh) sitting EOB  · SBA disconnect peritoneal dialysis lines sitting EOB    Patient left supine with all lines intact, call button in reach, bed alarm off, nurse notified and patient's father present    AM PAC 6 Click:  AM PAC Total Score: 14    Treatment & Education: (exercise)  1. Yellow theraband exercises (shoulder flexion, extension, horizontal ABD and ADD, ER) 8 reps BUE done bed level  With 4 rest breaks  2. Trunk  stretching (flexion-extension with scapular retraction, rotation and lateral with timing and frequency of exercise determined by the pt.  3. Sitting EOB 32 minutes: sitting balance improved from poor with BUE support needed>fair + with time in sitting;  Education:    Assessment:     Jenni Todd is a 48 y.o. female with a medical diagnosis of Closed fracture of left hip with routine healing, s/p Left hip ORIF.  She presents with Performance deficits affecting function are impaired self care skills, impaired functional mobilty, gait instability, impaired balance, decreased lower extremity function, decreased safety awareness, pain, impaired endurance, weakness, orthopedic precautions, impaired cardiopulmonary response to activity.   She continues to show low activity tolerance due to pain and fatigue with slow pacing of activity needed to optimize performance.  She was Mod A supine><sit EOB, with increased time needed to attain sitting balance EOB.  She completed G/H and UBD Set-Up Assist sitting EOB with trunk stretching exercise and UE strengthening exercise included in the session.  Continuation of OT treatment is needed to maximize function while in the hospital.    Rehab Prognosis:  good; patient would benefit from acute skilled OT services to address these deficits and reach maximum level of function.       Plan:     Patient to be seen daily to address the above listed problems via self-care/home management, therapeutic activities, therapeutic exercises  · Plan of Care Expires: 10/14/18  · Plan of Care Reviewed with: patient, father    This Plan of care has been discussed with the patient who was involved in its development and understands and is in agreement with the identified goals and treatment plan    GOALS:   Multidisciplinary Problems     Occupational Therapy Goals        Problem: Occupational Therapy Goal    Goal Priority Disciplines Outcome Interventions   Occupational Therapy Goal     OT, PT/OT  Ongoing (interventions implemented as appropriate)    Description:  Goals to be met by: 10/14/18     Patient will increase functional independence with ADLs by performing:    LE Dressing with Moderate Assistance and Assistive Devices as needed.  Grooming while standing at sink with Contact Guard Assistance.  Toileting from bedside commode with Minimal Assistance for hygiene and clothing management.   Supine to sit with Moderate Assistance.  Stand pivot transfers with Contact Guard Assistance and maintaining weight-bearing precaution(s).  Step transfer with Contact Guard Assistance and maintaining weight-bearing precaution(s)  Toilet transfer to bedside commode with Contact Guard Assistance.                      Time Tracking:     OT Date of Treatment: 09/15/18  OT Start Time: 1210  OT Stop Time: 1319  OT Total Time (min): 69 min    Billable Minutes:Self Care/Home Management 45  Therapeutic Exercise 24    SABRA Ma  9/15/2018
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
## 2018-09-15 NOTE — PLAN OF CARE
Problem: Patient Care Overview  Goal: Plan of Care Review  Outcome: Ongoing (interventions implemented as appropriate)  Patient is s/p Left hip ORIF POD#2. Very somnolent during this shift, but AAOx4. Unable to wean off of 3 L o2 n/c. Afebrile. Blood glucose levels maintained with ac/hs accucheck and prn SSI. Patient has remained in bed sleeping during this shift. PD at bedside initiated by patient and continuing to infuse.

## 2018-09-15 NOTE — PLAN OF CARE
Problem: Patient Care Overview  Goal: Plan of Care Review  Outcome: Ongoing (interventions implemented as appropriate)  Pt free of trauma, falls, and injury. Pt VSS and afebrile throughout shift. Pt free of skin breakdown. Pt neurovascular checks are intact. Pt dressing is clean, dry, and intact. Pt has foam wedge and pillow between legs for comfort; verbalizes understanding of hip precautions. Pt pain has been moderately controlled by PO pain meds and tolerated well. Pt has ambulated with PT and needs 2 person assist with ambulating. Pt has been eating and voiding adequately throughout shift. Pt will connect herself to PD tonight at 2100 per dialysis nurse. Pt has call light in reach, bed alarm on, bed brakes on, side rails up x2, bed in low position, TEDs/SCDs on, IS at bedside, and nonskid socks on. Pt resting comfortably in bed. Will continue to monitor.

## 2018-09-15 NOTE — PROGRESS NOTES
Assessment/Plan:    Status Post left Hip pinning. Doing well postoperatively.     Continues current post-op course     LOS: 3 days     Subjective:  Post-Operative Day: 2 Status Post left Hip pinning  Systemic or Specific Complaints:No Complaints    Objective:  Vital signs in last 24 hours:  Temp:  [98 °F (36.7 °C)-101.6 °F (38.7 °C)] 98 °F (36.7 °C)  Pulse:  [] 110  Resp:  [17-20] 20  SpO2:  [95 %-100 %] 99 %  BP: ()/(49-77) 153/71      General: alert, appears stated age and cooperative   Wound: Wound clean and dry no evidence of infection.   Motion: Painful range of Motion   DVT Exam: No evidence of DVT seen on physical exam.     Data Review  CBC:   Lab Results   Component Value Date    WBC 10.08 09/15/2018    RBC 2.32 (L) 09/15/2018    HGB 7.4 (L) 09/15/2018    HCT 22.9 (L) 09/15/2018     (H) 09/15/2018

## 2018-09-15 NOTE — PROGRESS NOTES
Patient on 3L NC saturations 99% with no reported distress.IS done with good effort will continue to monitor.

## 2018-09-15 NOTE — PLAN OF CARE
Problem: Occupational Therapy Goal  Goal: Occupational Therapy Goal  Goals to be met by: 10/14/18     Patient will increase functional independence with ADLs by performing:    LE Dressing with Moderate Assistance and Assistive Devices as needed.  Grooming while standing at sink with Contact Guard Assistance.  Toileting from bedside commode with Minimal Assistance for hygiene and clothing management.   Supine to sit with Moderate Assistance.  Stand pivot transfers with Contact Guard Assistance and maintaining weight-bearing precaution(s).  Step transfer with Contact Guard Assistance and maintaining weight-bearing precaution(s)  Toilet transfer to bedside commode with Contact Guard Assistance.     Outcome: Ongoing (interventions implemented as appropriate)  Pt continues to show low activity tolerance due to pain and fatigue with slow pacing of activity needed to optimize performance.  She was Mod A supine><sit EOB, with increased time needed to attain sitting balance EOB.  She completed G/H and UBD Set-Up Assist sitting EOB with trunk stretching exercise and UE strengthening exercise included in the session.  SABRA Ma 9/15/2018

## 2018-09-16 NOTE — PLAN OF CARE
Problem: Physical Therapy Goal  Goal: Physical Therapy Goal  Goals to be met by 18.    Patient will increase functional independence with mobility by performin. Suine<>sit min A  2. Sit<>stand with RW and CGA  3. Gait > 50' with RW and CGA with L LE TTWB  4. Up/down curb step with RW with L TTWB and min A.    Pt with limited progress today 2/2 low H&H and awaiting blood transfusion. Pt did participate well in bed mobility and sat up EOB ~45 min.. Sup to sit modA, sit to sup maxA,. Scooting at EOB min.A. Pt with 4/10 pain in LLE with mobility today. Pt would be an excellent inpt REHAB candidate.

## 2018-09-16 NOTE — PLAN OF CARE
Problem: Patient Care Overview  Goal: Plan of Care Review  Outcome: Ongoing (interventions implemented as appropriate)  Patient received on 2LNC with 99% saturation. Oxygen titrated per saturation.

## 2018-09-16 NOTE — PT/OT/SLP PROGRESS
Physical Therapy Treatment    Patient Name:  Jenni Todd   MRN:  [id number]    Recommendations:     Discharge Recommendations:  rehabilitation facility   Discharge Equipment Recommendations: (TBD)   Barriers to discharge: Decreased caregiver support    Assessment:     Jenni Todd is a 48 y.o. female admitted with a medical diagnosis of Closed fracture of left hip with routine healing.  She presents with the following impairments/functional limitations:  weakness, impaired endurance, impaired functional mobilty, impaired self care skills, decreased lower extremity function, decreased upper extremity function, pain, edema, impaired skin, decreased ROM, orthopedic precautions, gait instability, impaired sensation ;pt with fair mobility today, limited session 2/2 pt with low H&H today and needing to receive blood, no t/f's OOB perf'd. Pt highly motivated to return to her full time job of 29 years and would make an excellent inpt REHAB candidate.    Rehab Prognosis:  excellent; patient would benefit from acute skilled PT services to address these deficits and reach maximum level of function.      Recent Surgery: Procedure(s) (LRB):  ORIF, HIP (Left) 3 Days Post-Op    Plan:     During this hospitalization, patient to be seen daily to address the above listed problems via gait training, therapeutic activities, therapeutic exercises  · Plan of Care Expires:  10/13/18   Plan of Care Reviewed with: patient    Subjective     Communicated with nurse prior to session.  Patient found R sidelying with wedge supporting in supine upon PT entry to room, agreeable to treatment.      Chief Complaint: pain with t/f's/mvmt.  Patient comments/goals: Pt wanting to sit up at EOB  Pain/Comfort:  · Pain Rating 1: 0/10(at rest)  · Location - Side 1: Left  · Location - Orientation 1: generalized  · Location 1: hip  · Pain Addressed 1: Pre-medicate for activity, Distraction, Reposition  · Pain Rating Post-Intervention 1: 4/10(with  t/f's to EOB)    Patients cultural, spiritual, Anabaptism conflicts given the current situation: none stated    Objective:     Patient found with: peripheral IV, SCD, oxygen(peritoneal dialysis connected)     General Precautions: Standard, fall, diabetic   Orthopedic Precautions:LLE toe touch weight bearing   Braces: N/A     Functional Mobility:  · Bed Mobility:     · Scooting: minimum assistance  · Bridging: stand by assistance  · Supine to Sit: moderate assistance  · Sit to Supine: maximal assistance and at LE's      AM-PAC 6 CLICK MOBILITY  Turning over in bed (including adjusting bedclothes, sheets and blankets)?: 3  Sitting down on and standing up from a chair with arms (e.g., wheelchair, bedside commode, etc.): 3  Moving from lying on back to sitting on the side of the bed?: 2  Moving to and from a bed to a chair (including a wheelchair)?: 1  Need to walk in hospital room?: 1  Climbing 3-5 steps with a railing?: 1  Basic Mobility Total Score: 11       Therapeutic Activities and Exercises:   pt perf'd sup to sit to sup t/f's with use of bedrail and HOB partially up, mod/maxA at LLE, pt perf'd seated AAROM LAQ's x 15 reps, sat up EOB ~45 min. With SBA, occasionally working on scooting to readjust hips with Letty. Pt ret'd to supine with maxA at LE's, pt using RLE to assist with scooting in supine; perf'd LE ex's of AP's, QS,GS, AAROM heelslides x 15 ea. Pt unable to perform sit to stand t/f's today 2/2 low H&H and waiting on blood transfusion.    Patient left HOB elevated with all lines intact, call button in reach and nurse notified..    GOALS:   Multidisciplinary Problems     Physical Therapy Goals        Problem: Physical Therapy Goal    Goal Priority Disciplines Outcome Goal Variances Interventions   Physical Therapy Goal     PT, PT/OT Ongoing (interventions implemented as appropriate)     Description:  Goals to be met by 9-21-18.    Patient will increase functional independence with mobility by  performin. Suine<>sit min A  2. Sit<>stand with RW and CGA  3. Gait > 50' with RW and CGA with L LE TTWB  4. Up/down curb step with RW with L TTWB and min A.                     Time Tracking:     PT Received On: 18  PT Start Time: 1305     PT Stop Time: 1410  PT Total Time (min): 65 min     Billable Minutes: Therapeutic Activity 3 and Therapeutic Exercise 1    Treatment Type: Treatment  PT/PTA: PTA     PTA Visit Number: 1     Suki Wasserman, PTA  2018

## 2018-09-16 NOTE — PLAN OF CARE
Problem: Patient Care Overview  Goal: Plan of Care Review  Outcome: Ongoing (interventions implemented as appropriate)  Pt on 2 lpm nasal cannula, SpO2 97%. IS done.
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
## 2018-09-16 NOTE — NURSING
Critical value Hematocrit of 19.1 reported by Sebastian from lab. Notified Dr Purcell this morning. MD stated will probably order a transfusion today. Will continue to monitor.

## 2018-09-16 NOTE — PLAN OF CARE
Problem: Patient Care Overview  Goal: Plan of Care Review  Outcome: Ongoing (interventions implemented as appropriate)  Patient free of trauma, falls, and injuries during shift. Patient vital signs stable and afebrile through shift. Patient free of skin breakdowns.  Hip dressing is clean, dry, and intact. Patient pain controlled by po medication and tolerated well. Educated on importance of taking medications at the correct times. Pt connected self to peritoneal dialysis at 2100. Refused abduction pillow, educated on not crossing ankles and not internally rotating left hip. Not able to wean patient off of 2L NC. Neurovascular checks WNL.  Plan of care reviewed with patient. Purposeful rounding done. Call light at bedside, bed at lowest position, brakes on, non-skid socks on. Will continue to monitor

## 2018-09-16 NOTE — PROGRESS NOTES
Assessment/Plan:    Status Post left Hip pinning. Doing well postoperatively. Slow to mobilize. Sitting bedside today.. Currently receiving transfusion.    Continues current post-op course     LOS: 4 days     Subjective:  Post-Operative Day: 3 Status Post left Hip pinning  Systemic or Specific Complaints:No Complaints    Objective:  Vital signs in last 24 hours:  Temp:  [98.2 °F (36.8 °C)-99.1 °F (37.3 °C)] 98.6 °F (37 °C)  Pulse:  [84-90] 86  Resp:  [16-18] 18  SpO2:  [95 %-100 %] 97 %  BP: (105-131)/(55-74) 128/62      General: alert, appears stated age and cooperative   Wound: Wound clean and dry no evidence of infection.   Motion: Painful range of Motion   DVT Exam: No evidence of DVT seen on physical exam.     Data Review  CBC:   Lab Results   Component Value Date    WBC 8.60 09/16/2018    RBC 1.95 (L) 09/16/2018    HGB 6.2 (L) 09/16/2018    HCT 19.1 (LL) 09/16/2018     09/16/2018

## 2018-09-16 NOTE — NURSING
Pt receiving 1 unit of blood. Remains free from fall, injury, and skin breakdown.VSS stable on RA and afebrile. Positions self with assist. Tolerating Q2H turning schedule. Denied pain this shift. Neuro checks WDL. TEDs/SCDs maintained.Tolerating ordered diet. IV site WNL. Plan of care reviewed with patient and all questions answered. Bed low, locked w/ bed alarm on. Call light within reach. Purposeful rounding performed. No other complaints at this time.

## 2018-09-16 NOTE — PT/OT/SLP PROGRESS
Occupational Therapy  Not Seen Note    Patient Name:  Jenni Todd   MRN:  [id number]    Patient not seen today secondary to Nursing care, preporation for blood transfusion in process.. Will follow-up Monday 9/17/18.    SABRA Ma  9/16/2018

## 2018-09-17 PROBLEM — J96.01 ACUTE RESPIRATORY FAILURE WITH HYPOXIA: Status: RESOLVED | Noted: 2017-11-30 | Resolved: 2018-01-01

## 2018-09-17 NOTE — PLAN OF CARE
Patient declined by [facility] as they are not in network with patient's insurance. Voicemail left for [facility] to follow up on pending referral - awaiting response

## 2018-09-17 NOTE — PLAN OF CARE
Problem: Patient Care Overview  Goal: Plan of Care Review  Outcome: Ongoing (interventions implemented as appropriate)  VSS and afebrile, AAOX4. Pt free of skin breakdowns, frequent weight shifts encouraged and turned q2h. Left hip dressing is clean, dry, and intact. Neurovascular checks WNL. 2L NC maintained with 99%sats. Patient connected to peritoneal dialysis at 2100 and will disconnect self at 0900. No c/o pain or discomfort. SCDs/Teds applied and assessed. Bloody BM at 2030 with 2 baseball sized clots passed. Stat CBC was done and will continue to monitor patients morning labs. Resting comfortably and able to make needs known. Plan of care reviewed with patient. Purposeful rounding done. Call light at bedside, bed at lowest position, brakes on, non-skid socks on. Will continue to monitor.

## 2018-09-17 NOTE — PLAN OF CARE
Problem: Physical Therapy Goal  Goal: Physical Therapy Goal  Goals to be met by 18.    Patient will increase functional independence with mobility by performin. Suine<>sit min A  2. Sit<>stand with RW and CGA  3. Gait > 50' with RW and CGA with L LE TTWB  4. Up/down curb step with RW with L TTWB and min A.    Outcome: Ongoing (interventions implemented as appropriate)    Patient required Min A for bed mobility, Min A/Moderate A for transfers and maintain TTWB LLE, patient was able to take steps with Min A

## 2018-09-17 NOTE — PLAN OF CARE
Met with patient at bedside who is adamant that Medicare is her primary insurance & C is secondary - spoke with Penny from [facility] who followed up & verified that patient is correct - Penny completing eval process but reports they should be able to accept patient today - Dr Purcell updated

## 2018-09-17 NOTE — PT/OT/SLP PROGRESS
"Physical Therapy Treatment    Patient Name:  Jenni Todd   MRN:  [id number]    Recommendations:     Discharge Recommendations:  rehabilitation facility   Discharge Equipment Recommendations: (defer to rehab)   Barriers to discharge: Decreased caregiver support    Assessment:     Jenni Todd is a 48 y.o. female admitted with a medical diagnosis of Closed fracture of left hip with routine healing.  She presents with the following impairments/functional limitations:  weakness, impaired endurance, impaired self care skills, impaired balance, gait instability, impaired functional mobilty, pain, orthopedic precautions, edema patient improved with mobility on this day and was able to maintain TTWB LLE     Rehab Prognosis:  good; patient would benefit from acute skilled PT services to address these deficits and reach maximum level of function.      Recent Surgery: Procedure(s) (LRB):  ORIF, HIP (Left) 4 Days Post-Op    Plan:     During this hospitalization, patient to be seen daily to address the above listed problems via gait training, therapeutic activities, therapeutic exercises  · Plan of Care Expires:  10/13/18   Plan of Care Reviewed with: patient    Subjective     Communicated with nurse prior to session.  Patient found supine upon PT entry to room, agreeable to treatment.      Chief Complaint: patient stated " It really hurt when I was getting off the bedpan"   Patient comments/goals: I want to get stronger   Pain/Comfort:  · Pain Rating 1: 3/10  · Location - Side 1: Left  · Location - Orientation 1: generalized  · Location 1: hip  · Pain Addressed 1: Pre-medicate for activity, Reposition, Distraction  · Pain Rating Post-Intervention 1: 0/10    Patients cultural, spiritual, Mu-ism conflicts given the current situation: none stated    Objective:     Patient found with: peripheral IV, oxygen(1.5 L)     General Precautions: Standard, diabetic, fall   Orthopedic Precautions:LLE toe touch weight bearing "   Braces: N/A     Functional Mobility:  · Supine to sit  With Min A at trunk and use of bed rails; required verbal cues for proper hand placement   · Sit to stand from bed, from bedside chair  with Rolling walker with Moderate A of 2 people one person at trunk and another to maintain TTWB LLE   · Patient took 5 steps forward, then 8 steps with Rolling walker with Min A of 2 people for safety and chair follow. Patient was able to maintain TTWB LLE.    AM-PAC 6 CLICK MOBILITY  Turning over in bed (including adjusting bedclothes, sheets and blankets)?: 3  Sitting down on and standing up from a chair with arms (e.g., wheelchair, bedside commode, etc.): 3  Moving from lying on back to sitting on the side of the bed?: 3  Moving to and from a bed to a chair (including a wheelchair)?: 3  Need to walk in hospital room?: 3  Climbing 3-5 steps with a railing?: 1  Basic Mobility Total Score: 16       Therapeutic Activities and Exercises:  Educated patient on the importance of IS machine and performing several times throughout the day.     Patient left up in chair with all lines intact, call button in reach and nurse notified..    GOALS:   Multidisciplinary Problems     Physical Therapy Goals        Problem: Physical Therapy Goal    Goal Priority Disciplines Outcome Goal Variances Interventions   Physical Therapy Goal     PT, PT/OT Ongoing (interventions implemented as appropriate)     Description:  Goals to be met by 18.    Patient will increase functional independence with mobility by performin. Suine<>sit min A  2. Sit<>stand with RW and CGA  3. Gait > 50' with RW and CGA with L LE TTWB  4. Up/down curb step with RW with L TTWB and min A.                     Time Tracking:     PT Received On: 18  PT Start Time: 1120     PT Stop Time: 1150  PT Total Time (min): 30 min     Billable Minutes: Gait Training 15 and Therapeutic Activity 15    Treatment Type: Treatment  PT/PTA: PTA     PTA Visit Number: 2      Geri Morin, PTA  09/17/2018

## 2018-09-17 NOTE — DISCHARGE INSTRUCTIONS
Discharge Instructions for Hip Fracture Surgery  You had surgery to repair a hip fracture. The type of surgery you received depends on the location and severity of the fracture. You may have pins, screws, or rods (internal fixation devices) holding the fractured bone in place. Or some or all of your hip may have been replaced. You must take care of your new hip as you recover at home or in a rehabilitation facility. This means moving and sitting the way you were taught in the hospital. You must also see your doctor for follow-up visits as you slowly return to activity.  Hip repair for fracture or hip replacement is major surgery. So dont be surprised if it takes a few months before you can move comfortably. Plan to have your family and friends help when you return home.  Home care  · Take your pain medicine exactly as directed.  · Dont drive until your doctor says its OK. And never drive if you are taking opioid pain medicine.  · Wear the support stockings you were given in the hospital. Wear them 24 hours a day for 3 to 4 week(s).  · Make arrangements to have your staples removed 2 weeks after surgery. The staples were used to close the skin incision.  · Get up and carefully move around to relieve pain.  · If you received an artificial hip joint, tell all your healthcare providers--including your dentist--about the joint before any procedure. You may need to take antibiotics before dental work and other medical procedures to reduce the risk of infection.  Incision care  · Avoid infection by washing your hands often. If an infection occurs, it will need to be treated with antibiotics immediately. So call your doctor right away if you think you may have an infection. Symptoms of infection include a fever or leakage of white, greenish, or yellowish-colored fluid from the incision.  · Check your incision daily for redness, tenderness, or drainage.  · Avoid soaking your wound in water (no hot tubs, bathtubs,  swimming pools) until your doctor says its OK.  · Wait 7 day(s) after your surgery to begin showering. Then shower as needed. Carefully wash your incision with soap and water. Gently pat it dry. Dont rub the incision, or apply creams or lotions. And sit on a shower stool when you shower to keep from falling.  Sitting and sleeping  · Dont sit for more than 30 to 45 minutes at a time.  · Use chairs with arms, and sit with your knees slightly lower than your hips. Dont sit on low or sagging chairs or couches.  · Dont lean forward while sitting.  · Dont cross your legs.  · Keep your feet flat on the floor. Dont turn your foot or leg inward. This stresses your hip joint.  · Use an elevated toilet seat for 6 week(s) after surgery.  · Use pillows between your legs when sleeping on your back or on your healthy side.  · Sit on a firm cushion when you ride in a car and avoid sitting too low. Try not to bend your hip too much when getting in and out of the car.  Moving safely  · Dont bend at the hip when you bend over. Dont bend at the waist to put on socks and shoes. And avoid picking up items from the floor.  · Use a cane, crutches, a walker, or handrails until your balance, flexibility, and strength improve. And remember to ask for help from others when you need it.  · Free up your hands so that you can use them to keep balance. Use a buffy pack, apron, or pockets to carry things.  · Follow your doctors orders regarding how much weight to place on the affected leg.  · Do all exercises as instructed.  · Arrange your household to keep the items you need within reach.  · Remove electrical cords, throw rugs, and anything else that may cause you to fall.  · Use nonslip bath mats, grab bars, an elevated toilet seat, and a shower chair in your bathroom.  Follow-up  Make a follow-up appointment as directed by your doctor.     Call 911  Call 911 right away if you have any of the following:  · Chest pain  · Shortness of  breath  When to call your doctor  Call your doctor right away if you have any of the following:  · Hip pain gets worse  · Pain or swelling of your calf or leg not related to your incision  · Tenderness or redness in your calf  · Fever of 100.4°F  (38.0°C) or higher, or as directed by your healthcare provider  · Shaking chills  · Swelling or redness at the incision site gets worse  · Fluid draining from the incision   Date Last Reviewed: 11/15/2015  © 6208-7800 [employer]. [address] [place] [address]. All rights reserved. This information is not intended as a substitute for professional medical care. Always follow your healthcare professional's instructions.      Hip Fracture Surgery: Recovering at Home  Going home is a big step. To help you prepare, your healthcare team will arrange for any medicine, equipment, and services you need. Family and friends can help by doing errands and providing emotional support. You may also need a family member or friend to stay with you for a time.  Managing pain    You will likely be prescribed pain medicine to use at home. Ask your healthcare provider what your medicine does and how long it takes to work. Dont wait for the pain to get bad. Take your medicine on time as directed. Be sure to tell your healthcare provider if it doesnt help ease your pain. Also mention if it causes constipation. This can often be eased by taking over-the-counter stool softeners.  Walking helps  Walking a little more each day is the best thing you can do for your recovery. Walking helps build strength and ease pain. It also helps keep your hip from getting stiff. Try to make walking part of your daily routine. Start with short walks. Then go a little farther each time. Keep in mind, recovering from a hip fracture takes time. Some days it will be harder to get around than others. But try to stay upbeat. Set simple goals that you can meet. Doing even basic tasks, like  checking your mail or going to the grocers, can help you feel better.  Seeing your healthcare provider  Keep follow-up appointments with your healthcare provider. These help make sure youre healing well. You should also ask your healthcare provider about:  · When you can bear weight on your hip  · How long to take any new medicine  · Tests and treatment for osteoporosis  · When its OK to begin driving again  · Safe positions for sex  · Taking antibiotics before dental and medical work  Call 911  Call 911 right away if you have any of the following:  · Chest pain  · Shortness of breath  When to call your doctor   Call your surgeon or doctor right away if you have any of the following:  · Hip pain gets worse  · Pain or swelling in your calf or leg not related to your incision  · Tenderness or redness in your calf  · Fever of 100.4°F (38°C) or higher, or as directed by your healthcare provider  · Shaking chills  · Swelling or redness at the incision site gets worse  · Fluid draining from the incision   Date Last Reviewed: 10/1/2015  © 6393-8338 [employer]. [address] [place] [address], [place] [address]. All rights reserved. This information is not intended as a substitute for professional medical care. Always follow your healthcare professional's instructions.
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
## 2018-09-17 NOTE — PLAN OF CARE
Problem: Patient Care Overview  Goal: Plan of Care Review  Outcome: Ongoing (interventions implemented as appropriate)  Patient remains on room air with adequate saturation. IS encouraged and completed independently.

## 2018-09-17 NOTE — PT/OT/SLP PROGRESS
Occupational Therapy   Treatment    Name: Jenni Todd  MRN: [id number]  Admitting Diagnosis:  Closed fracture of left hip with routine healing  4 Days Post-Op    Recommendations:     Discharge Recommendations: rehabilitation facility  Discharge Equipment Recommendations:  (TBD)  Barriers to discharge:  Decreased caregiver support    Subjective     Communicated with: nursing prior to session.  Pain/Comfort:  · Pain Rating 1: 3/10  · Location - Side 1: Left  · Location - Orientation 1: generalized  · Location 1: hip  · Pain Addressed 1: Pre-medicate for activity, Reposition, Distraction  · Pain Rating Post-Intervention 1: 3/10    Patients cultural, spiritual, Holiness conflicts given the current situation: none    Objective:     Patient found with: peripheral IV, SCD, oxygen    General Precautions: Standard, diabetic, fall   Orthopedic Precautions:LLE toe touch weight bearing   Braces: N/A     Occupational Performance:    Bed Mobility:    · Patient completed Supine to Sit with minimum assistance, with side rail and HOB slightly elevated      Functional Mobility/Transfers:  · Patient completed Sit <> Stand Transfer with moderate assistance and of 2 persons  with  bed rails when EOB x1, and off Bedside chair while maintaining weight bearing precautions.    · Functional Mobility: forward steps with MIN A x2 trials.     Activities of Daily Living:  · Upper Body Dressing: modified independence donning and doffing gown  · Lower Body Dressing: total assistance donning socks    Patient left up in chair with call button in reach    AMPA 6 Click:  AMPA Total Score: 16    Treatment & Education:  Bed mobility and functional transfers. Pt required MOD A x2 off EOB with B handrails, and off bedside chair for safety using rolling walker while maintaining weight bearing precautions. Pt completed BUE exercises with yellow theraband for increased UE strength and overall endurance for functional transfers. Pt completed 10 LLE  leg lifts for increased independence with transfers and maintaining weight bearing precautions.     Assessment:     Jenni Todd is a 48 y.o. female with a medical diagnosis of Closed fracture of left hip with routine healing.  She presents with the following Performance deficits affecting function are weakness, impaired endurance, impaired self care skills, gait instability, pain, decreased safety awareness, impaired functional mobilty, decreased lower extremity function, impaired balance, edema, decreased upper extremity function.  Pt continues to make progress towards goals. Pt required MIN A for Supine > sit with HOB slightly elevated and use of bedrail. Pt required MOD A x2 off EOB with B handrails, and off bedside chair for safety using rolling walker while maintaining weight bearing precautions. Pt completed BUE exercises with yellow theraband for increased UE strength and overall endurance for functional transfers. Pt would benefit from skilled occupational therapy intervention for increased activity tolerance and independence in ADL's.    Rehab Prognosis:  Good; patient would benefit from acute skilled OT services to address these deficits and reach maximum level of function.       Plan:     Patient to be seen daily to address the above listed problems via self-care/home management, therapeutic activities, therapeutic exercises  · Plan of Care Expires: 10/14/18  · Plan of Care Reviewed with: patient    This Plan of care has been discussed with the patient who was involved in its development and understands and is in agreement with the identified goals and treatment plan    GOALS:   Multidisciplinary Problems     Occupational Therapy Goals        Problem: Occupational Therapy Goal    Goal Priority Disciplines Outcome Interventions   Occupational Therapy Goal     OT, PT/OT Ongoing (interventions implemented as appropriate)    Description:  Goals to be met by: 10/14/18     Patient will increase  functional independence with ADLs by performing:    LE Dressing with Moderate Assistance and Assistive Devices as needed.  Grooming while standing at sink with Contact Guard Assistance.  Toileting from bedside commode with Minimal Assistance for hygiene and clothing management.   Supine to sit with Moderate Assistance. (MET 9/17/18)  Stand pivot transfers with Contact Guard Assistance and maintaining weight-bearing precaution(s).  Step transfer with Contact Guard Assistance and maintaining weight-bearing precaution(s)  Toilet transfer to bedside commode with Contact Guard Assistance.                       Time Tracking:     OT Date of Treatment: 09/17/18  OT Start Time: 1120  OT Stop Time: 1149  OT Total Time (min): 29 min    Billable Minutes:Therapeutic Activity 15  Therapeutic Exercise 14    Overlap with PT for safety  Phoenix Schneider, OT  9/17/2018

## 2018-09-17 NOTE — NURSING
H &H results came in at 6.9 and 21.3. Notified DIONE Shearer, no further orders at this time. Will continue to monitor patient. Patient has hooked up self to peritoneal dialysis and is resting comfortably.

## 2018-09-17 NOTE — PROGRESS NOTES
[facility] Peritoneal Dialysis Procedure Note    Subjective:  The patient is currently doing well on peritoneal dialysis.  She states that she feels well, denies pain or shortness of breath (as she had yesterday).  Otherwise denies palpitations, abd pain, n/v/d, LE swelling.  She is currently sitting up in chair and tolerating PD without any immediate complications.  She did not connect herself last night, so she initiated treatment this morning.      Vitals reviewed, and are currently stable.      GEN:  appears well, comfortably sitting up in chair, in no acute distress  HEENT/NECK:  anicteric sclera, MMM, no apparent JVD  CVS:  RRR with no murmurs or rubs  PULM:  mild rales in L lower lung field  ABD:  soft, nontender, nondistended, normal bowel sounds  EXTR:  no edema  ACCESS:  PD catheter, currently connected    PD orders: 10 hours overnight, 2.5L fills, 5 cycles, alternating 2.5% and 4.25% (for volume management) with last fill, 2.5L  Meds: will discuss EPO dose with dialysis unit    All recent labs have been reviewed and the dialysis bath has been adjusted accordingly.    Baldev Munroe MD

## 2018-09-17 NOTE — PLAN OF CARE
Penny from [facility] reports patient has been accepted to admit today. Report can be called to [phone]. Orders faxed to [phone]. Transportation set up with [employer] wheelchair van - pickup scheduled for 2:15pm. Patient aware - CRISTOBAL Biggs updated      09/17/18 2478   Final Note   Assessment Type Final Discharge Note   Discharge Disposition Rehab   Discharge planning education complete? Yes   What phone number can be called within the next 1-3 days to see how you are doing after discharge? [phone]   Discharge plans and expectations educations in teach back method with documentation complete? Yes   Offered [facility] -- Bedside Delivery? n/a   Referral to Outpatient Case Management complete? n/a   Referral to / orders for Home Health Complete? n/a   30 day supply of medicines given at discharge, if documented non-compliance / non-adherence? n/a   Did you assess the readiness or willingness of the family or caregiver to support self management of care? Yes   Right Care Referral Info   Post Acute Recommendation IRF   Facility Name [facility]

## 2018-09-17 NOTE — PROGRESS NOTES
[facility] Peritoneal Dialysis Procedure Note    Subjective:  The patient is currently doing well on PD.  She states that she feels fine, denies pain or shortness of breath.  Otherwise denies palpitations, abd pain, n/v/d, LE swelling.  She denies constipation, had a large BM (loose) last night.  No issues with her appetite or energy level, although she is not able to get out of bed on her own yet without the assistance of PT.    Vitals reviewed, and are currently stable.      GEN:  appears well, comfortably sitting up in bed, in no acute distress, eating breakfast  HEENT/NECK:  anicteric sclera, MMM, no apparent JVD  CVS:  RRR with no murmurs or rubs  PULM:  CTAB  ABD:  soft, nontender, nondistended, normal bowel sounds  EXTR:  no edema  ACCESS:  PD catheter, currently connected to cycler    PD orders: 10 hours overnight, 2.5L fills, 5 cycles, alternating 2.5% and 4.25% (for volume management) with last fill, 2.0L  Meds: receives 40,000 units EPO twice monthly as an outpt, we will administer EPO three times weekly while here    All recent labs have been reviewed and the dialysis bath has been adjusted accordingly.    Hemodynamic monitoring ongoing while on peritoneal dialysis.      Baldev Munroe MD

## 2018-09-17 NOTE — ASSESSMENT & PLAN NOTE
H/H 6.2/19.1 so long discussion held with the patient regarding the risks versus benefits of transfusion of 1U PRBCs, and case discussed with Nephrology  Chronic anemia in combination with expected blood loss with surgery may further decrease levels  Continue to monitor closely
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
## 2018-09-17 NOTE — PROGRESS NOTES
[facility] Progress Note    Admit Date: 9/11/2018   LOS: 5 days     SUBJECTIVE:     Follow-up For:  ESRD evaluation and management    Overnight events:  She went to surgery this morning, tolerated well.  She is currently post-op, still sedated and able to wake up to answer a few of my questions.  She feels ok, denies CP, SOB, palp, abd pain, n/v/d, LE swelling.  She tolerated PD overnight without any immediate complications.    Scheduled Meds:   amLODIPine  10 mg Oral Daily    aspirin  81 mg Oral Daily    atorvastatin  40 mg Oral Daily    calcitRIOL  0.5 mcg Oral Daily    cinacalcet  90 mg Oral Daily    docusate sodium  100 mg Oral Daily    epoetin adonay  6,000 Units Intravenous Every Mon, Wed, Fri    famotidine  20 mg Oral BID    insulin detemir U-100  10 Units Subcutaneous Daily    lactulose  15 g Oral TID    polyethylene glycol  17 g Oral Daily    sevelamer carbonate  1,600 mg Oral TID WM    vitamin renal formula (B-complex-vitamin c-folic acid)  1 capsule Oral Daily     Continuous Infusions:  PRN Meds:sodium chloride, acetaminophen, acetaminophen, dextrose 50%, dextrose 50%, diphenhydrAMINE, glucagon (human recombinant), glucose, glucose, HYDROcodone-acetaminophen, HYDROcodone-acetaminophen, insulin aspart U-100, ondansetron, ondansetron, promethazine (PHENERGAN) IVPB, ramelteon, sodium chloride 0.9%    Review of patient's allergies indicates:   Allergen Reactions    Clindamycin Diarrhea    Flagyl [metronidazole hcl]        Review of Systems  Review of systems not obtained due to patient factors currently sedated, able to wake up to answer a few of my questions, full ROS not performed.    OBJECTIVE:     Vital Signs (Most Recent)  Temp: 98.2 °F (36.8 °C) (09/17/18 0840)  Pulse: 84 (09/17/18 0840)  Resp: 18 (09/17/18 0840)  BP: (!) 141/69 (09/17/18 0840)  SpO2: 100 % (09/17/18 0840)    Vital Signs Range (Last 24H):  Temp:  [97 °F (36.1 °C)-99.1 °F (37.3 °C)]   Pulse:  [84-90]   Resp:  [16-18]    BP: (117-141)/(58-69)   SpO2:  [96 %-100 %]     I & O (Last 24H):    Intake/Output Summary (Last 24 hours) at 9/17/2018 0920  Last data filed at 9/17/2018 0400  Gross per 24 hour   Intake 282.08 ml   Output 961 ml   Net -678.92 ml     Physical Exam:  GEN:  appears well, comfortably lying in bed, in no acute distress, sedated as above  HEENT/NECK:  anicteric sclera, dry MM, no apparent JVD  CVS:  RRR with no murmurs or rubs  PULM:  CTAB  ABD:  soft, nontender, nondistended, normal bowel sounds  EXTR:  no edema  SKIN:  no rashes or lesions, warm and not diaphoretic  ACCESS:  PD catheter dressed    Laboratory:  Results for AUDI MORALES (MRN [id number]) as of 9/17/2018 09:07   Ref. Range 9/13/2018 05:21 9/13/2018 07:55 9/13/2018 08:09 9/13/2018 11:53 9/13/2018 12:26 9/13/2018 15:46 9/13/2018 18:47 9/13/2018 23:23 9/13/2018 23:37   WBC Latest Ref Range: 3.90 - 12.70 K/uL 11.73        12.12   RBC Latest Ref Range: 4.00 - 5.40 M/uL 2.40 (L)        2.19 (L)   Hemoglobin Latest Ref Range: 12.0 - 16.0 g/dL 7.6 (L)        7.0 (L)   Hematocrit Latest Ref Range: 37.0 - 48.5 % 23.0 (L)        21.5 (L)   MCV Latest Ref Range: 82 - 98 fL 96        98   MCH Latest Ref Range: 27.0 - 31.0 pg 31.7 (H)        32.0 (H)   MCHC Latest Ref Range: 32.0 - 36.0 g/dL 33.0        32.6   RDW Latest Ref Range: 11.5 - 14.5 % 15.8 (H)        16.1 (H)   Platelets Latest Ref Range: 150 - 350 K/uL 402 (H)        347   MPV Latest Ref Range: 9.2 - 12.9 fL 9.7        9.6   Gran% Latest Ref Range: 38.0 - 73.0 % 80.8 (H)        75.9 (H)   Gran # (ANC) Latest Ref Range: 1.8 - 7.7 K/uL 9.5 (H)        9.2 (H)   Lymph% Latest Ref Range: 18.0 - 48.0 % 14.7 (L)        17.7 (L)   Lymph # Latest Ref Range: 1.0 - 4.8 K/uL 1.7        2.1   Mono% Latest Ref Range: 4.0 - 15.0 % 3.7 (L)        5.4   Mono # Latest Ref Range: 0.3 - 1.0 K/uL 0.4        0.7   Eosinophil% Latest Ref Range: 0.0 - 8.0 % 0.3        0.6   Eos # Latest Ref Range: 0.0 - 0.5 K/uL 0.0         0.1   Basophil% Latest Ref Range: 0.0 - 1.9 % 0.2        0.2   Baso # Latest Ref Range: 0.00 - 0.20 K/uL 0.02        0.02   Sodium Latest Ref Range: 136 - 145 mmol/L 134 (L)           Potassium Latest Ref Range: 3.5 - 5.1 mmol/L 4.1           Chloride Latest Ref Range: 95 - 110 mmol/L 93 (L)           CO2 Latest Ref Range: 23 - 29 mmol/L 23           Anion Gap Latest Ref Range: 8 - 16 mmol/L 18 (H)           BUN, Bld Latest Ref Range: 6 - 20 mg/dL 64 (H)           Creatinine Latest Ref Range: 0.5 - 1.4 mg/dL 14.8 (H)           eGFR if non African American Latest Ref Range: >60 mL/min/1.73 m^2 3 (A)           eGFR if African American Latest Ref Range: >60 mL/min/1.73 m^2 3 (A)           Glucose Latest Ref Range: 70 - 110 mg/dL 169 (H)           Calcium Latest Ref Range: 8.7 - 10.5 mg/dL 9.1           Phosphorus Latest Ref Range: 2.7 - 4.5 mg/dL 8.4 (H)           Magnesium Latest Ref Range: 1.6 - 2.6 mg/dL 1.7           Alkaline Phosphatase Latest Ref Range: 55 - 135 U/L 121           Total Protein Latest Ref Range: 6.0 - 8.4 g/dL 7.3           Albumin Latest Ref Range: 3.5 - 5.2 g/dL 2.5 (L)           Total Bilirubin Latest Ref Range: 0.1 - 1.0 mg/dL 0.6           AST Latest Ref Range: 10 - 40 U/L 202 (H)           ALT Latest Ref Range: 10 - 44 U/L 191 (H)           Group & Rh Unknown  B POS          INDIRECT KYLEIGH Unknown  NEG          POCT Glucose Latest Ref Range: 70 - 110 mg/dL   173 (H)  214 (H) 200 (H) 191 (H) 166 (H)    Differential Method Unknown Automated        Automated   SURG - FL SURGERY FLUORO GREATER THAN 1 HOUR Unknown    Rpt            Diagnostic Results:  Labs: Reviewed    ASSESSMENT/PLAN:     41 y.o. female with a history of ESRD (on PD at [facility] under the care of Dr. Watt), HTN, DM who presents with hip fracture after a fall in [place].  She is now s/p internal fixation.  [facility] consulted for ESRD evaluation and management.     1. ESRD - on PD, as above  - PD today, now that she  is post-op  - strict I/O, daily weights  - avoid gadolinium, fleets, phos-based binders, routine protocolized fluid administration     2. HTN  - well controlled     3. Anemia of ESRD  - will discuss Epo with her PD nurse     4. Anion gap metabolic acidosis (with concomitant metabolic alkalosis)  - not vomiting  - may be related to chronic respiratory acidosis  - continue monitor for now, will modulate with RRT     5. Hypoalbuminemia  - do not know her baseline, will discuss with PD nurse        Baldev Munroe MD

## 2018-09-17 NOTE — PLAN OF CARE
Problem: Patient Care Overview  Goal: Plan of Care Review  Outcome: Ongoing (interventions implemented as appropriate)  Patient on 1.5L NC with Sats 99%. IS done with good effort. Will continue to monitor.

## 2018-09-17 NOTE — NURSING
Pt felt like she was having a BM, passed 2 clots and stool. Notified DIONE Shearer. Stat CBC order put in per DIONE Shearer. Pt stable and not symptomatic. Will continue to monitor.

## 2018-09-17 NOTE — SUBJECTIVE & OBJECTIVE
Interval History: The patient is nervous about the need for blood transfusion but is agreeable to proceed    Review of Systems  Objective:     Vital Signs (Most Recent):  Temp: 98.8 °F (37.1 °C) (09/16/18 1911)  Pulse: 89 (09/16/18 1911)  Resp: 16 (09/16/18 1911)  BP: (!) 122/58 (09/16/18 1911)  SpO2: 96 % (09/16/18 1911) Vital Signs (24h Range):  Temp:  [98.2 °F (36.8 °C)-99.1 °F (37.3 °C)] 98.8 °F (37.1 °C)  Pulse:  [84-90] 89  Resp:  [16-18] 16  SpO2:  [95 %-100 %] 96 %  BP: (105-131)/(55-74) 122/58     Weight: 99.8 kg (219 lb 16 oz)  Body mass index is 34.46 kg/m².    Intake/Output Summary (Last 24 hours) at 9/16/2018 1929  Last data filed at 9/16/2018 1911  Gross per 24 hour   Intake 282.08 ml   Output 761 ml   Net -478.92 ml      Physical Exam   Constitutional: She is oriented to person, place, and time. She appears well-developed and well-nourished.   Pleasant, obese female in NAD cooperative with exam   HENT:   Head: Normocephalic and atraumatic.   Mouth/Throat: Oropharynx is clear and moist.   Eyes: Conjunctivae are normal. Right eye exhibits no discharge. Left eye exhibits no discharge.   Neck: Normal range of motion. Neck supple.   Cardiovascular: Regular rhythm, normal heart sounds and intact distal pulses.   Pulses:       Radial pulses are 2+ on the right side, and 2+ on the left side.   Pulmonary/Chest: Effort normal and breath sounds normal. No respiratory distress.   Abdominal: Soft. Bowel sounds are normal. She exhibits no distension. There is no tenderness.   Musculoskeletal: She exhibits edema.        Left hip: She exhibits decreased range of motion, tenderness and swelling.   Neurological: She is alert and oriented to person, place, and time. She has normal strength. GCS eye subscore is 4. GCS verbal subscore is 5. GCS motor subscore is 6.   Skin: Skin is warm and dry.   Psychiatric: She has a normal mood and affect. Her speech is normal and behavior is normal. Judgment and thought content  normal.       Significant Labs:   CBC:   Recent Labs   Lab  09/15/18   0506  09/16/18 0446   WBC  10.08  8.60   HGB  7.4*  6.2*   HCT  22.9*  19.1*   PLT  368*  341     CMP:   Recent Labs   Lab  09/15/18   0506  09/16/18 0446   NA  133*  133*   K  3.8  3.5   CL  90*  90*   CO2  20*  22*   GLU  283*  205*   BUN  70*  65*   CREATININE  13.5*  12.8*   CALCIUM  8.1*  7.7*   PROT  7.9  7.3   ALBUMIN  2.6*  2.2*   BILITOT  0.4  0.4   ALKPHOS  128  133   AST  154*  99*   ALT  60*  17   ANIONGAP  23*  21*   EGFRNONAA  3*  3*     Magnesium:   Recent Labs   Lab  09/15/18   0506  09/16/18 0446   MG  1.7  1.7

## 2018-09-17 NOTE — PLAN OF CARE
Problem: Occupational Therapy Goal  Goal: Occupational Therapy Goal  Goals to be met by: 10/14/18     Patient will increase functional independence with ADLs by performing:    LE Dressing with Moderate Assistance and Assistive Devices as needed.  Grooming while standing at sink with Contact Guard Assistance.  Toileting from bedside commode with Minimal Assistance for hygiene and clothing management.   Supine to sit with Moderate Assistance. (MET 9/17/18)  Stand pivot transfers with Contact Guard Assistance and maintaining weight-bearing precaution(s).  Step transfer with Contact Guard Assistance and maintaining weight-bearing precaution(s)  Toilet transfer to bedside commode with Contact Guard Assistance.     Outcome: Ongoing (interventions implemented as appropriate)  Pt continues to make progress towards goals. Pt required MIN A for Supine > sit with HOB slightly elevated and use of bedrail. Pt required MOD A x2 off EOB with B handrails, and off bedside chair for safety using rolling walker while maintaining weight bearing precautions. Pt completed BUE exercises with yellow theraband for increased UE strength and overall endurance for functional transfers. Pt would benefit from skilled occupational therapy intervention for increased activity tolerance and independence in ADL's.

## 2018-09-17 NOTE — PLAN OF CARE
[facility]  [address]  [place] [address]  [phone] after hours  [phone]  Fax      Facility Transfer Orders                        09/17/2018    Admit to: Inpatient Rehab    Diagnoses:  Active Hospital Problems    Diagnosis  POA    *Closed fracture of left hip with routine healing [S72.002D]  Not Applicable     Priority: 1 - High    Abnormal liver enzymes [R74.8]  No    ESRD (end stage renal disease) on dialysis [N18.6, Z99.2]  Not Applicable    Metabolic acidosis, increased anion gap [E87.2]  Yes    Type 2 diabetes 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 [E11.22, N18.6, Z99.2]  Not Applicable    Essential hypertension [I10]  Yes    Thrombocytosis [D47.3]  Yes    Hyperlipidemia [E78.5]  Yes    Anemia in chronic kidney disease, on chronic dialysis [N18.6, D63.1, Z99.2]  Not Applicable     on Epogen        Resolved Hospital Problems    Diagnosis Date Resolved POA    Acute respiratory failure with hypoxia [J96.01] 09/17/2018 Yes     Priority: 2     Closed fracture of left hip [S72.002A] 09/13/2018 Yes       Allergies:  Review of patient's allergies indicates:   Allergen Reactions    Clindamycin Diarrhea    Flagyl [metronidazole hcl]        Vitals:      Every shift     Diet: 2000 ADA, renal    Activity:      - Up in a chair each morning as tolerated   - Ambulate with assistance to bathroom     - Weight bearing: Toe touch left lower extremity for 8 weeks    Nursing Precautions:         - Fall precautions     - Decubitus precautions:        -  for positioning   - Pressure reducing foam mattress   - Turn patient every two hours. Use wedge pillows to anchor patient    CONSULTS:      PT to evaluate and treat - five times a week, toe touch weight bearing to the left extremity for 8 weeks.     OT to evaluate and treat - five times a week    LABS:  CMP, phosphorus, magnesium Qweek      DIABETES CARE:      Check blood sugar:       Fingerstick blood sugar AC and HS      Report CBG < 60 or > 400 to physician.                                          Insulin Sliding Scale          Glucose  Novolog Insulin Subcutaneous        0 - 60   Orange juice or glucose tablet, hold insulin      No insulin   201-250  2 units   251-300  4 units   301-350  6 units   351-400  8 units   >400   10 units then call physician      Medications: Discontinue all previous medication orders, if any. See new list below.       Jenni Todd   Home Medication Instructions SHAWNA:[id number]    Printed on:09/17/18 1323   Medication Information                      amlodipine (NORVASC) 10 MG tablet  Take 10 mg by mouth once daily.              aspirin (ECOTRIN) 81 MG EC tablet  Take 1 tablet (81 mg total) by mouth once daily.             atorvastatin (LIPITOR) 40 MG tablet  Take 40 mg by mouth every evening.              calcitRIOL (ROCALTROL) 0.5 MCG Cap  Take 1 capsule by mouth every morning.              cilostazol (PLETAL) 50 MG Tab  Take 1 tablet (50 mg total) by mouth 2 (two) times daily.             cinacalcet (SENSIPAR) 90 MG Tab  Take 1 tablet by mouth every evening.              epoetin adonay 10,000 unit/mL Soln 1 mL, epoetin adonay 20,000 unit/mL Soln 1 mL  Inject 30,000 Units into the vein every 14 (fourteen) days.             gabapentin (NEURONTIN) 100 MG capsule  1 capsule 3 (three) times daily.             gabapentin (NEURONTIN) 800 MG tablet  1 tablet 3 (three) times daily.             heparin sodium,porcine (HEPARIN, PORCINE,) 5,000 unit/mL injection  Inject 1.6 mLs (8,000 Units total) into the skin every 12 (twelve) hours.             HUMULIN R REGULAR U-100 INSULN 100 unit/mL injection  Inject 200 units into dialysis bag every evening.             nateglinide (STARLIX) 120 MG tablet  STARLIX 120MG 30 MINUTES BEFORE EACH MEAL.             ONETOUCH VERIO Strp  USE 1 STRIP ONCE DAILY IN VITRO             PLAVIX 75 mg tablet  TAKE 1 BY MOUTH DAILY              sevelamer carbonate (RENVELA) 800 mg Tab  Take 3 to 4 tablets by mouth twice daily as needed             vitamin renal formula, B-complex-vitamin c-folic acid, (B COMPLEX-C-FOLIC ACID) 1 mg Cap  Take 1 capsule by mouth once daily. 1 Capsule Oral Every day                       _________________________________  Nancy Purcell MD  09/17/2018

## 2018-09-17 NOTE — NURSING
IV catheter removed with catheter intact without any signs of swelling, redness or pain. Patient remained free from falls, injury, and skin break down this hospilization. Patient picked up by Ricardo via WC to be transferred to [facility].

## 2018-09-18 NOTE — PT/OT/SLP DISCHARGE
Occupational Therapy Discharge Summary    Jenni Todd  MRN: [id number]   Principal Problem: Closed fracture of left hip with routine healing      Patient Discharged from acute Occupational Therapy on 9/17/2018.  Please refer to prior OT note dated 9/17/2018 for functional status.    Assessment:      Goals partially met.    Objective:     GOALS:   Multidisciplinary Problems     Occupational Therapy Goals     Not on file          Multidisciplinary Problems (Resolved)        Problem: Occupational Therapy Goal    Goal Priority Disciplines Outcome Interventions   Occupational Therapy Goal   (Resolved)     OT, PT/OT Outcome(s) achieved    Description:  Goals to be met by: 10/14/18     Patient will increase functional independence with ADLs by performing:    LE Dressing with Moderate Assistance and Assistive Devices as needed.  Grooming while standing at sink with Contact Guard Assistance.  Toileting from bedside commode with Minimal Assistance for hygiene and clothing management.   Supine to sit with Moderate Assistance. (MET 9/17/18)  Stand pivot transfers with Contact Guard Assistance and maintaining weight-bearing precaution(s).  Step transfer with Contact Guard Assistance and maintaining weight-bearing precaution(s)  Toilet transfer to bedside commode with Contact Guard Assistance.                       Reasons for Discontinuation of Therapy Services  Transfer to alternate level of care.      Plan:     Patient Discharged to: Inpatient Rehab    Juliana Fine, OTR/L  9/18/2018     OT of record not present at this time.

## 2018-09-18 NOTE — PT/OT/SLP DISCHARGE
Physical Therapy Discharge Summary    Name: Jenni Todd  MRN: [id number]   Principal Problem: Closed fracture of left hip with routine healing     Patient Discharged from acute Physical Therapy on 18.  Please refer to prior PT noted date on 18 for functional status.     Assessment:     Patient has not met goals.    Objective:     GOALS:   Multidisciplinary Problems     Physical Therapy Goals     Not on file          Multidisciplinary Problems (Resolved)        Problem: Physical Therapy Goal    Goal Priority Disciplines Outcome Goal Variances Interventions   Physical Therapy Goal   (Resolved)     PT, PT/OT Outcome(s) achieved     Description:  Goals to be met by 18.    Patient will increase functional independence with mobility by performin. Suine<>sit min A  2. Sit<>stand with RW and CGA  3. Gait > 50' with RW and CGA with L LE TTWB  4. Up/down curb step with RW with L TTWB and min A.                     Reasons for Discontinuation of Therapy Services  Transfer to alternate level of care.      Plan:     Patient Discharged to: Inpatient Rehab. PT of record not available for documentation.      Sarahi Rojo, PT  2018

## 2018-09-20 PROBLEM — K92.2 ACUTE LOWER GI BLEEDING: Status: ACTIVE | Noted: 2018-01-01

## 2018-09-20 PROBLEM — J18.9 PNEUMONIA OF RIGHT UPPER LOBE DUE TO INFECTIOUS ORGANISM: Status: RESOLVED | Noted: 2017-11-29 | Resolved: 2018-01-01

## 2018-09-20 NOTE — SUBJECTIVE & OBJECTIVE
Subjective:     Interval History: By the time of assessment, patient was receiving her third unit of PRBC, reporting no abdominal pain with Hct demonstrating an inappropriately low response of 20.4 from 18.7. Per bedside RN, had 1 grossly bloody bowel movement.     Post-Op Info:  * No surgery found *          Medications:  Continuous Infusions:  Scheduled Meds:   lidocaine (PF) 10 mg/ml (1%)  5 mL Infiltration ED 1 Time    lidocaine HCL 10 mg/ml (1%)        pantoprazole  40 mg Intravenous BID     PRN Meds:   sodium chloride    sodium chloride    sodium chloride    sodium chloride    dextrose 50%    glucagon (human recombinant)    insulin aspart U-100    ondansetron    sodium chloride 0.9%        Objective:     Vital Signs (Most Recent):  Temp: 97.9 °F (36.6 °C) (09/20/18 0915)  Pulse: 95 (09/20/18 1045)  Resp: 15 (09/20/18 1045)  BP: (!) 140/70 (09/20/18 1045)  SpO2: 96 % (09/20/18 1045) Vital Signs (24h Range):  Temp:  [97.8 °F (36.6 °C)-98.4 °F (36.9 °C)] 97.9 °F (36.6 °C)  Pulse:  [87-99] 95  Resp:  [0-22] 15  SpO2:  [96 %-100 %] 96 %  BP: ()/(41-81) 140/70     Intake/Output - Last 3 Shifts       09/18 0700 - 09/19 0659 09/19 0700 - 09/20 0659 09/20 0700 - 09/21 0659    Blood  1124.5 2241.8    Total Intake(mL/kg)  1124.5 (11) 2241.8 (22)    Net  +1124.5 +2241.8                 Physical Exam   Constitutional: She appears well-developed and well-nourished.   Fatigued, awakens easily to voice but falls asleep immediately    HENT:   Head: Normocephalic and atraumatic.   Eyes: EOM are normal.   Cardiovascular: Normal rate.   HR 90s, regular rhythm   Pulmonary/Chest: Effort normal.   Abdominal: Soft.   nontender to palpation without rebound or guarding    Skin: Skin is warm and dry.     Significant Labs:  BMP (Last 3 Results):   Recent Labs   Lab  09/16/18   0446  09/17/18   0542  09/19/18   2247  09/20/18   0330   GLU  205*  253*  270*  269*   NA  133*  131*  131*  129*   K  3.5  3.3*  3.8  3.8    CL  90*  89*  90*  94*   CO2  22*  21*  22*  19*   BUN  65*  67*  65*  69*   CREATININE  12.8*  12.2*  11.1*  11.3*   CALCIUM  7.7*  7.8*  8.2*  7.4*   MG  1.7  1.6   --   1.6     CBC (Last 3 Results):   Recent Labs   Lab  09/17/18   0542  09/19/18   2247  09/20/18   0330   WBC  8.16  10.09  31.20*   RBC  2.36*  1.89*  2.19*   HGB  7.5*  6.3*  6.6*   HCT  23.1*  18.7*  20.4*   PLT  348  382*  328   MCV  98  99*  93   MCH  31.8*  33.3*  30.1   MCHC  32.5  33.7  32.4       Significant Diagnostics:  CT: I have reviewed all pertinent results/findings within the past 24 hours:  no active bleed noted on CTA, ascites (on PD)
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
## 2018-09-20 NOTE — ASSESSMENT & PLAN NOTE
--labs done 2015, Patient unaware of diagnosis  --Heme/onc consulted for assistance given active GI bleed
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
## 2018-09-20 NOTE — ASSESSMENT & PLAN NOTE
--labs doen 2015, patient unaware of diagnosis  --will not give DDAVP as unsure if patient type 1, 2 or 3  --Heme/onc consulted for assistance given active GIB
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
## 2018-09-20 NOTE — ASSESSMENT & PLAN NOTE
ESRD on [facility]  Dr. Shukri [facility]  9 hours  2.5L fills volumes  2L last fill  4 cycles  No residual renal function  2.5% dextrose    Plan/Recommendations:  CCPD today   9 hour duration with 2.5L fill volumes  Will use 2.5% dextrose with 4 cycles  No last fill, as patient is scheduled for colonoscopy tomorrow.  Per ISPD/ASGE guidelines, will leave abdomen empty tomorrow for her procedure to help decrease risk of peritonitis  Recommend prophy abx prior to procedure.  Recommend Ampicllin 1gm IVPB in AM prior to procedure along with Gentamicin 5 mg/kg IV 1 hr prior to procedure.    Anemia of ESRD  Complicated by acute GI bleed  PRBC transfusion per primary team.  Will start on BUSHRA TIW    BMM  Phos elevated.  Renal diet when able to tolerate PO  Renvela 1600 mg PO TID with meals when able

## 2018-09-20 NOTE — TREATMENT PLAN
GI Treatment Plan    Jenni Todd is a 48 y.o. female admitted to hospital 9/19/2018 (Hospital Day: 1) due to <principal problem not specified>.     48 year old woman history of ESRD 2/2 FSGS, T2DM, HLD, CAD s/p BMS (?2013), ?TIA on ASA/Plavix, recently admitted (discharged 9/17) s/p hip # ORIF, discharged to rehab, presenting with concerning for painless BRBPR. In ED noted to be hypotensive with episode of BRBPR and initial labs with decline in Hgb 7.5 -> 6.3. Vitals improved with fluid resuscitation. Labs otherwise with normal LFT, INR, Crt increased 3 -> 3.6, stable BUN.    Past Endo Hx  - none in chart    Objective  Temp:  [98.2 °F (36.8 °C)] 98.2 °F (36.8 °C) (09/19 2214)  Pulse:  [97] 97 (09/19 2208)  BP: (103)/(67) 103/67 (09/19 2208)  Resp:  [18] 18 (09/19 2208)  SpO2:  [99 %] 99 % (09/19 2208)      Laboratory    Recent Labs   Lab  09/16/18   2049  09/17/18   0542  09/19/18   2247   HGB  6.9*  7.5*  6.3*       Lab Results   Component Value Date    WBC 10.09 09/19/2018    HGB 6.3 (L) 09/19/2018    HCT 18.7 (LL) 09/19/2018    MCV 99 (H) 09/19/2018     09/17/2018       Lab Results   Component Value Date     (L) 09/19/2018    K 3.8 09/19/2018    CL 90 (L) 09/19/2018    CO2 22 (L) 09/19/2018    BUN 65 (H) 09/19/2018    CREATININE 11.1 (H) 09/19/2018    CALCIUM 8.2 (L) 09/19/2018    ANIONGAP 19 (H) 09/19/2018    ESTGFRAFRICA 4.2 (A) 09/19/2018    EGFRNONAA 3.6 (A) 09/19/2018       Lab Results   Component Value Date    ALT <5 (L) 09/19/2018    AST 22 09/19/2018    ALKPHOS 141 (H) 09/19/2018    BILITOT 0.4 09/19/2018       Lab Results   Component Value Date    INR 1.1 09/19/2018    INR 1.0 04/15/2016    INR 1.1 07/16/2015     Imp  48F on ASA/Plavix/Hep recently discharged s/p hip ORIF, presenting with BRBPR and decline H&H 7.5 -> 6.3. Likely etiology is a lower GI bleed from source such as diverticulosis. Given anti-platelet and anti-coagulant therapy and recent hospitalization/OR would be at risk  for UGIB source such as PUD though less likely given hemodynamically stabilized, stable BUN and noted to have BRBPR.    Plan  - fluid resuscitate  - Protonix 40mg IV q12h  - No octreotide. No evidence of underlying liver disease  - NPO at MN, will evaluate role for endoscopy in AM  - transfuse for hgb >7 or active bleeding. Of note, was iron deficient on 9/15 (no ferritin available) and required 1u pRBC during hospitalization.   - hold heparin (was on 5000 unit daily for DVT prophylaxis)  - hold ASA/Plavix in acute setting. Unclear why she is on DAPT therapy. Does not appear to have any recent coronary stenting. Should be investigated further as may only require ASA therapy.  - if hemodynamically unstable, can consider CTA, though patient with CKD would want to be cautious of IV contrast  - discussed with ED team    Thank you for involving us in the care of Jenni Todd. Please call with any additional questions, concerns or changes in the patient's clinical status.    Adolph Bond MD  Gastroenterology Fellow, PGY IV  Pager: [phone]

## 2018-09-20 NOTE — ASSESSMENT & PLAN NOTE
--multiple episodes of BRBPR  --CRS performed bedside anoscopy in ED; no evidence of bleeding hemorrhoids  --CTA without evidence of active bleed  --GI aware of patient, will likely scope this am  --CBC Q4  --protonix BID  --transfusing for hgb < 7

## 2018-09-20 NOTE — PROGRESS NOTES
[facility]  Colorectal Surgery  Progress Note    Patient Name: Jenni Todd  MRN: [id number]  Admission Date: 9/19/2018  Hospital Length of Stay: 0 days  Attending Physician: Yareli Hobson MD    Subjective:     Interval History: By the time of assessment, patient was receiving her third unit of PRBC, reporting no abdominal pain with Hct demonstrating an inappropriately low response of 20.4 from 18.7. Per bedside RN, had 1 grossly bloody bowel movement.     Post-Op Info:  * No surgery found *          Medications:  Continuous Infusions:  Scheduled Meds:   lidocaine (PF) 10 mg/ml (1%)  5 mL Infiltration ED 1 Time    lidocaine HCL 10 mg/ml (1%)        pantoprazole  40 mg Intravenous BID     PRN Meds:   sodium chloride    sodium chloride    sodium chloride    sodium chloride    dextrose 50%    glucagon (human recombinant)    insulin aspart U-100    ondansetron    sodium chloride 0.9%        Objective:     Vital Signs (Most Recent):  Temp: 97.9 °F (36.6 °C) (09/20/18 0915)  Pulse: 95 (09/20/18 1045)  Resp: 15 (09/20/18 1045)  BP: (!) 140/70 (09/20/18 1045)  SpO2: 96 % (09/20/18 1045) Vital Signs (24h Range):  Temp:  [97.8 °F (36.6 °C)-98.4 °F (36.9 °C)] 97.9 °F (36.6 °C)  Pulse:  [87-99] 95  Resp:  [0-22] 15  SpO2:  [96 %-100 %] 96 %  BP: ()/(41-81) 140/70     Intake/Output - Last 3 Shifts       09/18 0700 - 09/19 0659 09/19 0700 - 09/20 0659 09/20 0700 - 09/21 0659    Blood  1124.5 2241.8    Total Intake(mL/kg)  1124.5 (11) 2241.8 (22)    Net  +1124.5 +2241.8                 Physical Exam   Constitutional: She appears well-developed and well-nourished.   Fatigued, awakens easily to voice but falls asleep immediately    HENT:   Head: Normocephalic and atraumatic.   Eyes: EOM are normal.   Cardiovascular: Normal rate.   HR 90s, regular rhythm   Pulmonary/Chest: Effort normal.   Abdominal: Soft.   nontender to palpation without rebound or guarding    Skin: Skin is warm and dry.      Significant Labs:  BMP (Last 3 Results):   Recent Labs   Lab  09/16/18   0446  09/17/18   0542  09/19/18   2247  09/20/18   0330   GLU  205*  253*  270*  269*   NA  133*  131*  131*  129*   K  3.5  3.3*  3.8  3.8   CL  90*  89*  90*  94*   CO2  22*  21*  22*  19*   BUN  65*  67*  65*  69*   CREATININE  12.8*  12.2*  11.1*  11.3*   CALCIUM  7.7*  7.8*  8.2*  7.4*   MG  1.7  1.6   --   1.6     CBC (Last 3 Results):   Recent Labs   Lab  09/17/18   0542  09/19/18 2247 09/20/18   0330   WBC  8.16  10.09  31.20*   RBC  2.36*  1.89*  2.19*   HGB  7.5*  6.3*  6.6*   HCT  23.1*  18.7*  20.4*   PLT  348  382*  328   MCV  98  99*  93   MCH  31.8*  33.3*  30.1   MCHC  32.5  33.7  32.4       Significant Diagnostics:  CT: I have reviewed all pertinent results/findings within the past 24 hours:  no active bleed noted on CTA, ascites (on PD)     Assessment/Plan:     * Acute lower GI bleeding    Jenni Todd is a 48 y.o. female with history of ESRD on PD, cerebellar CVA on ASA/Plavix and recently heparin SC TID s/p hip pinning a week ago who is seen in ED for acute rectal bleeding, colorectal asked to perform anoscopy (negative) and to continued following for work-up for acute GIB, currently has transfused 4 U PRBC (8 ordered), 1 FFP (1 ordered), 1 plt, HR remains in the 90s, SBPs in 120s-150s     Recommendations:   · CTA  GIB protocol - negative w/o target for IR embolization  · Continue standard ruling out UGIB and GIB management as per GI - pending possible EGD   · Agree with holding anticoagulation and DVT ppc   · Thank you for consult, please call if surgery can be of further assistance (i.e. the patient is unstable despite ongoing transfusion and/or has failed all other non-operative management).  · CRS to continue following                   Tea Spencer MD  Colorectal Surgery  [facility]

## 2018-09-20 NOTE — CONSULTS
COLORECTAL SURGERY  Consultation      REASON FOR CONSULT:  Rectal bleeding    SUBJECTIVE:     HISTORY OF PRESENT ILLNESS:  Jenni Todd is a 48 y.o. female who is seen in the ED for rectal bleeding.    Patient is a 49 yo F w/ hx of ESRD on PD, cerebellar CVA, s/p hip pinning about a week ago who presents with acute GI bleed since 9 PM last night.  When moved her bowels, copious amount of bright red blood and clots passed into the toilet bowl.  She is on ASA, heparin, and clopidogrel. Denies any abdominal pain or other associated symptoms.  Only one prior episode of some mild blood mixed with stool during her recent hospitalization for hip surgery.  States she was constipated and was getting stool softeners and has never had any other bleeding from rectum since.  No prior colonoscopy. No family history of CRC or diverticulitis, etc.       MEDICATIONS:  Home Medications:  No current facility-administered medications on file prior to encounter.      Current Outpatient Medications on File Prior to Encounter   Medication Sig Dispense Refill    amlodipine (NORVASC) 10 MG tablet Take 10 mg by mouth once daily.       aspirin (ECOTRIN) 81 MG EC tablet Take 1 tablet (81 mg total) by mouth once daily. 30 tablet 12    atorvastatin (LIPITOR) 40 MG tablet Take 40 mg by mouth every evening.       calcitRIOL (ROCALTROL) 0.5 MCG Cap Take 1 capsule by mouth every morning.       cilostazol (PLETAL) 50 MG Tab Take 1 tablet (50 mg total) by mouth 2 (two) times daily. 180 tablet 3    cinacalcet (SENSIPAR) 90 MG Tab Take 1 tablet by mouth every evening.       epoetin adonay 10,000 unit/mL Soln 1 mL, epoetin adonay 20,000 unit/mL Soln 1 mL Inject 30,000 Units into the vein every 14 (fourteen) days.      gabapentin (NEURONTIN) 100 MG capsule 1 capsule 3 (three) times daily.      gabapentin (NEURONTIN) 800 MG tablet 1 tablet 3 (three) times daily.      heparin sodium,porcine (HEPARIN, PORCINE,) 5,000 unit/mL injection Inject 1.6  mLs (8,000 Units total) into the skin every 12 (twelve) hours. 10 mL 1    HUMULIN R REGULAR U-100 INSULN 100 unit/mL injection Inject 200 units into dialysis bag every evening.      nateglinide (STARLIX) 120 MG tablet STARLIX 120MG 30 MINUTES BEFORE EACH MEAL.      ONETOUCH VERIO Strp USE 1 STRIP ONCE DAILY IN VITRO  3    PLAVIX 75 mg tablet TAKE 1 BY MOUTH DAILY (Patient taking differently: TAKE 1 BY MOUTH EVERY MORNING) 90 tablet 0    sevelamer carbonate (RENVELA) 800 mg Tab Take 3 to 4 tablets by mouth twice daily as needed      vitamin renal formula, B-complex-vitamin c-folic acid, (B COMPLEX-C-FOLIC ACID) 1 mg Cap Take 1 capsule by mouth once daily. 1 Capsule Oral Every day         ALLERGIES:    Review of patient's allergies indicates:   Allergen Reactions    Clindamycin Diarrhea    Flagyl [metronidazole hcl]        PAST MEDICAL HISTORY:    Past Medical History:   Diagnosis Date    Abnormal finding on Pap smear, HPV DNA positive     Anemia associated with chronic renal failure     on Epogen    Blood type B+     Bulging discs - symptomatic      CAD (coronary artery disease)     Diabetes mellitus, type 2     ESRD (end stage renal disease) 2004    FSGS (focal segmental glomerulosclerosis)     with collapsing    Hyperlipidemia     Hypertension     Neuropathy     Obesity     Secondary hyperparathyroidism, renal     TIA (transient ischemic attack)     Uterine fibroid     small uterine        SURGICAL HISTORY:  Past Surgical History:   Procedure Laterality Date    CARDIAC CATHETERIZATION      PCI x 2     SECTION, CLASSIC      DEBRIDEMENT-WOUND Left 2016    [facility]    DIALYSIS FISTULA CREATION      multiple fistulas and grafts before PD     INCISION AND DRAINAGE (I&D), LABIAL N/A 2016    [facility] at [facility] OR    INCISION AND DRAINAGE (I&D), LABIAL (ADD ON) Left 2016    [facility] ROLDAN  [facility] OR    INCISION AND DRAINAGE OF WOUND Left 2016    VULVAR ABCESS WITH NECROSIS    OPEN REDUCTION AND INTERNAL FIXATION (ORIF) OF INJURY OF HIP Left 9/13/2018    Procedure: ORIF, HIP;  Surgeon: Tevin Grullon MD;  Location: [facility];  Service: Orthopedics;  Laterality: Left;    ORIF, HIP Left 9/13/2018    [facility] OR    PERITONEAL CATHETER INSERTION      PERMCATH REWIRE- TUNNELED CATH REWIRE Left 11/13/2017    [facility]formed by [facility]dev Munroe [facility] at [facility] CATH LAB    PERMCATH REWIRE- TUNNELED CATH REWIRE N/A 10/5/2017    [facility] CATH LAB    UMBILICAL HERNIA REPAIR         FAMILY HISTORY:  Family History   Problem Relation Age of Onset    Cancer Maternal Grandmother     Cancer Paternal Grandfather     Diabetes Maternal Aunt     Diabetes Paternal Aunt     Kidney disease Neg Hx     Heart disease Neg Hx     Ovarian cancer Neg Hx     Breast cancer Neg Hx        SOCIAL HISTORY:  Social History     Tobacco Use    Smoking status: Never Smoker    Smokeless tobacco: Never Used   Substance Use Topics    Alcohol use: No     Comment: Pt reports some social use of about 1-2 drinks about every six months.    Drug use: No        REVIEW OF SYSTEMS:  A 10-point review of systems is negative except for the above mentioned in the HPI, with additional as noted below:  Constitutional: Negative for fever or weight changes, no recent illness.  Respiratory: Negative for chest tightness.    Cardiovascular: Negative for chest pain or palpitations.  Gastrointestinal: Negative for constipation and diarrhea.   Genitourinary: Negative for difficulty urinating and dysuria.   Skin: Negative for rash and wound.   Neurological:  Negative for headaches or seizures.   Hematological: Does not bruise/bleed easily.       OBJECTIVE:     Most Recent Vitals:  Vitals:    09/19/18 2208 09/19/18 2214 09/19/18 2355 09/20/18 0001   BP: 103/67  (!) 99/54 (!) 131/56  "  Pulse: 97  95 92   Resp: 18  19 15   Temp:  98.2 °F (36.8 °C)     TempSrc:  Oral     SpO2: 99%  99% 100%   Weight: 102.1 kg (225 lb)      Height: 5' 7" (1.702 m)       09/20/18 0031 09/20/18 0046 09/20/18 0100   BP: (!) 121/58 (!) 116/56 (!) 113/41   Pulse: 96 95 93   Resp: 19 16 16   Temp:  98.4 °F (36.9 °C) 98.4 °F (36.9 °C)   TempSrc:   Oral   SpO2: 100% 100% 100%   Weight:      Height:              PHYSICAL EXAM:  Awake morbidly obese AA female.   Head normocephalic, atraumatic.  Trachea midline, neck supple.  Respirations unlabored with good inspiratory effort.  Heart regular rate and rhythm.  Abdomen soft, morbidly obese, baseline distended with ascites due to peritoneal dialysis, entirely nontender to palpation, PD catheter in place.       LABORATORY VALUES:  Lab Results   Component Value Date    WBC 10.09 09/19/2018    HGB 6.3 (L) 09/19/2018    HCT 18.7 (LL) 09/19/2018     (H) 09/19/2018    HGBA1C 6.3 (H) 09/12/2018     Lab Results   Component Value Date     (L) 09/19/2018    K 3.8 09/19/2018    CL 90 (L) 09/19/2018    CO2 22 (L) 09/19/2018    BUN 65 (H) 09/19/2018    CREATININE 11.1 (H) 09/19/2018     (H) 09/19/2018    CALCIUM 8.2 (L) 09/19/2018    MG 1.6 09/17/2018    PHOS 6.4 (H) 09/17/2018    AST 22 09/19/2018    ALT <5 (L) 09/19/2018    ALKPHOS 141 (H) 09/19/2018    BILITOT 0.4 09/19/2018    BILIDIR 0.2 01/04/2009    PROT 6.6 09/19/2018    ALBUMIN 2.2 (L) 09/19/2018    AMYLASE 7 (L) 07/13/2015    LIPASE 270 (H) 07/13/2015     Lab Results   Component Value Date    INR 1.1 09/19/2018     Lab Results   Component Value Date    TSH 2.212 02/24/2017    FREET4 1.03 07/06/2015    .7 (H) 04/19/2016         DIAGNOSTIC STUDIES:  none.    ASSESSMENT:     Jenni Todd is a 48 y.o. female with history of ESRD on PD, cerebellar CVA on ASA/Plavix and recently heparin SC TID s/p hip pinning a week ago who is seen in ED for acute rectal bleeding, colorectal asked to perform " anoscopy.    Anoscopy performed with no signs of bleeding hemorrhoids.      PLAN:  · Recommend STAT CTA abdomen/pelvis GI bleed protocol.  · Otherwise, standard ruling out UGIB and GIB management per GI.  · If CTA is positive, recommend IR embolization.  · Other care per ED/ICU.  · Recommendations discussed with ED staff immediately after patient encounter.   · Thank you for consult, please call if surgery can be of further assistance (i.e. the patient is unstable despite ongoing transfusion and/or has failed all other non-operative management).  · Discussed with Dr Childress.       Shanae Neely MD

## 2018-09-20 NOTE — CONSULTS
[facility]  Gastroenterology  Consult Note    Patient Name: Jenni Todd  MRN: [id number]  Admission Date: 9/19/2018  Hospital Length of Stay: 0 days  Code Status: Full Code   Attending Provider: Yareli Hobson MD   Consulting Provider: Marquis Villegas MD  Primary Care Physician: Michael Tan Iii, MD  Principal Problem:Acute lower GI bleeding    Inpatient consult to Gastroenterology  Consult performed by: Marquis Villegas MD  Consult ordered by: Teressa Horton NP        Subjective:     HPI:  48 year old female with a history of CAD s/p PCI in 2012 with BMS on DAPT, HTN, HLD, DM II, ESRD on PD, Von Willebrands disease and Factor V Liden on who GI is being consulted for hematochezia.     History provided by patient.  She was recently admitted from 9/11-9/17 for a left femoral fracture.   She was discharge to a SNF and starting yesterday she began to experience hematochezia which she described as bright blood with clots.She denies any near syncope, chest pain, shortness of breath, hematemesis or abdominal pain. At this point she is unsure of the total number of BM but she reports no further episodes since 2:30 am. No prior episodes of hematochezia and thinks that the last time she had Plavix/Pletal was possibly yesterday. On admission she was hypotensive with an H/H of 6.3/18.7 (down from 7.5/23 on 9/17). She has received a total of 4 units of blood as well as 1 of FFP and 1 of PLTS. She has been seen by CRS who reported no bleeding source on anoscopy.  She then had a CTA which did not show active extravasation of blood.             Past Medical History:   Diagnosis Date    Abnormal finding on Pap smear, HPV DNA positive 2014    Anemia associated with chronic renal failure     on Epogen    Blood type B+     Bulging discs - symptomatic      CAD (coronary artery disease)     Diabetes mellitus, type 2     ESRD (end stage renal disease) 04/20/2004    FSGS (focal segmental glomerulosclerosis)      with collapsing    Hyperlipidemia     Hypertension 2004    Neuropathy     Obesity     Secondary hyperparathyroidism, renal     TIA (transient ischemic attack)     Uterine fibroid     small uterine        Past Surgical History:   Procedure Laterality Date    CARDIAC CATHETERIZATION      PCI x 2     SECTION, CLASSIC      DEBRIDEMENT-WOUND Left 2016    [facility] by [facility]ox [facility] at [facility] OR    DIALYSIS FISTULA CREATION      multiple fistulas and grafts before PD     INCISION AND DRAINAGE (I&D), LABIAL N/A 2016    [facility]formed by [facility] Fernandez [facility] at [facility] OR    INCISION AND DRAINAGE (I&D), LABIAL (ADD ON) Left 2016    [facility] by [facility]ox [facility] at [facility] OR    INCISION AND DRAINAGE OF WOUND Left     VULVAR ABCESS WITH NECROSIS    OPEN REDUCTION AND INTERNAL FIXATION (ORIF) OF INJURY OF HIP Left 2018    Procedure: ORIF, HIP;  Surgeon: Tevin Grullon MD;  Location: [facility] OR;  Service: Orthopedics;  Laterality: Left;    ORIF, HIP Left 2018    [facility]formed by [facility]on [facility] at [facility] OR    PERITONEAL CATHETER INSERTION      PERMCATH REWIRE- TUNNELED CATH REWIRE Left 2017    Performed by Baldev Munroe MD at [facility] CATH LAB    PERMCATH REWIRE- TUNNELED CATH REWIRE N/A 10/5/2017    Performed by [facility]dev Munroe MD at [facility] CATH LAB    UMBILICAL HERNIA REPAIR         Review of patient's allergies indicates:   Allergen Reactions    Clindamycin Diarrhea    Flagyl [metronidazole hcl]      Family History     Problem Relation (Age of Onset)    Cancer Maternal Grandmother, Paternal Grandfather    Diabetes Maternal Aunt, Paternal Aunt        Tobacco Use    Smoking status: Never Smoker    Smokeless tobacco: Never Used   Substance and Sexual Activity    Alcohol use: No     Comment: Pt reports some social use of about 1-2 drinks about every six months.    Drug use: No    Sexual activity: Not Currently     Partners: Male     Birth control/protection:  None     Review of Systems   Constitutional: Negative for activity change, appetite change, chills, diaphoresis, fatigue, fever and unexpected weight change.   HENT: Negative for trouble swallowing and voice change.    Respiratory: Negative.    Cardiovascular: Negative.    Gastrointestinal: Positive for blood in stool. Negative for abdominal distention, abdominal pain, anal bleeding, constipation, diarrhea, nausea, rectal pain and vomiting.   Genitourinary: Negative.    Musculoskeletal: Negative.    Neurological: Negative.    Hematological: Negative.      Objective:     Vital Signs (Most Recent):  Temp: 97.9 °F (36.6 °C) (09/20/18 0915)  Pulse: 96 (09/20/18 0930)  Resp: 13 (09/20/18 0930)  BP: (!) 147/76 (09/20/18 0930)  SpO2: 97 % (09/20/18 0930) Vital Signs (24h Range):  Temp:  [97.8 °F (36.6 °C)-98.4 °F (36.9 °C)] 97.9 °F (36.6 °C)  Pulse:  [87-98] 96  Resp:  [9-21] 13  SpO2:  [97 %-100 %] 97 %  BP: ()/(41-81) 147/76     Weight: 102.1 kg (224 lb 16 oz) (09/20/18 0700)  Body mass index is 35.24 kg/m².      Intake/Output Summary (Last 24 hours) at 9/20/2018 1019  Last data filed at 9/20/2018 0915  Gross per 24 hour   Intake 3366.25 ml   Output --   Net 3366.25 ml       Lines/Drains/Airways     Drain                 Hemodialysis AV Fistula Left upper arm -- days          Arterial Line                 Arterial Line 09/20/18 0305 Right Brachial less than 1 day          Peripheral Intravenous Line                 Peripheral IV - Single Lumen 09/19/18 2318 Left Antecubital less than 1 day         Peripheral IV - Single Lumen 09/19/18 2319 Left Hand less than 1 day         Peripheral IV - Single Lumen 09/19/18 2319 Right Other less than 1 day                Physical Exam   Constitutional: She is oriented to person, place, and time. No distress.   HENT:   Head: Normocephalic and atraumatic.   Eyes: No scleral icterus.   Cardiovascular: Normal rate and regular rhythm.   Pulmonary/Chest: Effort normal and breath  sounds normal.   Abdominal: Bowel sounds are normal. She exhibits distension. She exhibits no mass. There is no rebound and no guarding. No hernia.   Musculoskeletal: She exhibits no edema.   Neurological: She is alert and oriented to person, place, and time.   Skin: She is not diaphoretic.       Significant Labs:  CBC:   Recent Labs   Lab  09/19/18 2247 09/20/18   0330   WBC  10.09  31.20*   HGB  6.3*  6.6*   HCT  18.7*  20.4*   PLT  382*  328     CMP:   Recent Labs   Lab  09/20/18   0330   GLU  269*   CALCIUM  7.4*   ALBUMIN  1.9*   PROT  5.3*   NA  129*   K  3.8   CO2  19*   CL  94*   BUN  69*   CREATININE  11.3*   ALKPHOS  115   ALT  5*   AST  22   BILITOT  0.6     Coagulation:   Recent Labs   Lab  09/19/18 2247   INR  1.1   APTT  26.3       Significant Imaging:  Imaging results within the past 24 hours have been reviewed.    Assessment/Plan:     * Acute lower GI bleeding    48 year old female with a history of CAD s/p PCI in 2012 with BMS on DAPT, HTN, HLD, DM II, ESRD on PD, Von Willebrands disease and Factor V Liden on who GI is being consulted for hematochezia. Based on description of bleeding and normal anoscopy her hematochezia could have secondary to a diverticular bleed less likely to be secondary to an upper GI bleed. She is stable this morning with no further bleeding and we will therefore proceed with colonoscopy in the morning.    Recommendations:  --CLD and NPO after MN for colonoscopy in the morning            Thank you for your consult. I will follow-up with patient. Please contact us if you have any additional questions.    Marquis Villegas M.D.  Gastroenterology Fellow, PGY-V  Pager: [phone]  [facility]

## 2018-09-20 NOTE — ASSESSMENT & PLAN NOTE
Jenni Todd is a 48 y.o. female with history of ESRD on PD, cerebellar CVA on ASA/Plavix and recently heparin SC TID s/p hip pinning a week ago who is seen in ED for acute rectal bleeding, colorectal asked to perform anoscopy (negative) and to continued following for work-up for acute GIB, currently has transfused 4 U PRBC (8 ordered), 1 FFP (1 ordered), 1 plt, HR remains in the 90s, SBPs in 120s-150s     Recommendations:   · CTA  GIB protocol - negative w/o target for IR embolization  · Continue standard ruling out UGIB and GIB management per GI with EGD and c-scope to localized source    · Thank you for consult, please call if surgery can be of further assistance (i.e. the patient is unstable despite ongoing transfusion and/or has failed all other non-operative management).
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
## 2018-09-20 NOTE — PROGRESS NOTES
Dialysis      CCPD cycles connected using aseptic technique. Tx for 9 hours and 4 cycles with total of 10 liters. Initial drain of 956 ml of clear effluent

## 2018-09-20 NOTE — ANESTHESIA PREPROCEDURE EVALUATION
09/20/2018  Pre-operative evaluation for Procedure(s) (LRB):  COLONOSCOPY (N/A)    Jenni Todd is a 48 y.o. female  with a history of CAD s/p PCI in 2012 with BMS on DAPT, HTN, HLD, DM II, ESRD on PD, Von Willebrands disease and Factor V Liden with hematochezia who is scheduled for Colonoscopy.  She is s/p ORIF Hip on 9/13/18 for femoral fracture.     LDA:   - 18G PIV in Left AC  - 20G PIV in Right Arm    Prev airway: Prior uncomplicated intubation with Glidescope; Grade I View    Drips: None    Patient Active Problem List   Diagnosis    Neuropathy    ESRD (end stage renal disease) on PD ( initiated dialysis 04/20/2004)    FSGS (focal segmental glomerulosclerosis) biopsy proven with collapsing features    Anemia in chronic kidney disease, on chronic dialysis    Hypertension    Hyperlipidemia    Obesity    CAD (coronary artery disease) with recent PCI 12/18/2012    Diabetes mellitus, type 2    Awaiting organ transplant status    Persistent headaches    Bulging discs - symptomatic     Spinal stenosis of sacral and sacrococcygeal region    Hypertensive renal disease    Sacral back pain    Fall at home    Generalized muscle weakness    Cerebellar stroke    Hypertension associated with diabetes    Thrombocytosis    Hypoalbuminemia    Hypomagnesemia    Factor 5 Leiden mutation, heterozygous    Von Willebrand disease    Lumbar radiculopathy    Cerebral infarction due to embolism of middle cerebral artery    Gait abnormality    Chronic low back pain    DDD (degenerative disc disease), lumbar    Coronary artery disease involving native coronary artery of native heart without angina pectoris    Type 2 diabetes mellitus with diabetic chronic kidney disease    Essential hypertension    PAD (peripheral artery disease)    CVA (cerebral vascular accident)    TIA (transient ischemic  attack)    Headache    Type 2 diabetes 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    H/O: CVA (cerebrovascular accident)    Pure hypercholesterolemia    ESRD (end stage renal disease) on dialysis    Metabolic acidosis, increased anion gap    Closed fracture of left hip with routine healing    Abnormal liver enzymes    Acute lower GI bleeding       Review of patient's allergies indicates:   Allergen Reactions    Clindamycin Diarrhea    Flagyl [metronidazole hcl]         No current facility-administered medications on file prior to encounter.      Current Outpatient Medications on File Prior to Encounter   Medication Sig Dispense Refill    amlodipine (NORVASC) 10 MG tablet Take 10 mg by mouth once daily.       aspirin (ECOTRIN) 81 MG EC tablet Take 1 tablet (81 mg total) by mouth once daily. 30 tablet 12    atorvastatin (LIPITOR) 40 MG tablet Take 40 mg by mouth every evening.       calcitRIOL (ROCALTROL) 0.5 MCG Cap Take 1 capsule by mouth every morning.       cilostazol (PLETAL) 50 MG Tab Take 1 tablet (50 mg total) by mouth 2 (two) times daily. (Patient taking differently: Take 50 mg by mouth 2 (two) times daily. ) 180 tablet 3    cinacalcet (SENSIPAR) 90 MG Tab Take 1 tablet by mouth every evening.       epoetin adonay 10,000 unit/mL Soln 1 mL, epoetin adonay 20,000 unit/mL Soln 1 mL Inject 30,000 Units into the vein every 14 (fourteen) days.      gabapentin (NEURONTIN) 100 MG capsule 1 capsule 3 (three) times daily.      gabapentin (NEURONTIN) 800 MG tablet 1 tablet 3 (three) times daily.      heparin sodium,porcine (HEPARIN, PORCINE,) 5,000 unit/mL injection Inject 1.6 mLs (8,000 Units total) into the skin every 12 (twelve) hours. 10 mL 1    HUMULIN R REGULAR U-100 INSULN 100 unit/mL injection Inject 200 units into dialysis bag every evening.      nateglinide (STARLIX) 120 MG tablet STARLIX 120MG 30 MINUTES BEFORE EACH MEAL.      ONETOUCH VERIO Strp USE 1 STRIP ONCE DAILY IN  VITRO  3    PLAVIX 75 mg tablet TAKE 1 BY MOUTH DAILY (Patient taking differently: TAKE 1 BY MOUTH EVERY MORNING) 90 tablet 0    sevelamer carbonate (RENVELA) 800 mg Tab Take 3 to 4 tablets by mouth twice daily as needed      vitamin renal formula, B-complex-vitamin c-folic acid, (B COMPLEX-C-FOLIC ACID) 1 mg Cap Take 1 capsule by mouth once daily. 1 Capsule Oral Every day         Past Surgical History:   Procedure Laterality Date    CARDIAC CATHETERIZATION      PCI x 2     SECTION, CLASSIC      DEBRIDEMENT-WOUND Left 2016    [facility]    DIALYSIS FISTULA CREATION      multiple fistulas and grafts before PD     INCISION AND DRAINAGE (I&D), LABIAL N/A 2016    [facility]andez [facility] OR    INCISION AND DRAINAGE (I&D), LABIAL (ADD ON) Left 2016    [facility] OR    INCISION AND DRAINAGE OF WOUND Left     VULVAR ABCESS WITH NECROSIS    OPEN REDUCTION AND INTERNAL FIXATION (ORIF) OF INJURY OF HIP Left 2018    Procedure: ORIF, HIP;  Surgeon: Tevin Grullon MD;  Location: [facility] OR;  Service: Orthopedics;  Laterality: Left;    ORIF, HIP Left 2018    [facility] at [facility] OR    PERITONEAL CATHETER INSERTION      PERMCATH REWIRE- TUNNELED CATH REWIRE Left 2017    [facility] CATH LAB    PERMCATH REWIRE- TUNNELED CATH REWIRE N/A 10/5/2017    [facility] CATH LAB    UMBILICAL HERNIA REPAIR         Social History     Socioeconomic History    Marital status: Single     Spouse name: Not on file    Number of children: Not on file    Years of education: Not on file    Highest education level: Not on file   Social Needs    Financial resource strain: Not on file    Food insecurity - worry: Not on file    Food insecurity - inability: Not on file    Transportation needs - medical: Not on file    Transportation  needs - non-medical: Not on file   Occupational History     Employer: [employer]   Tobacco Use    Smoking status: Never Smoker    Smokeless tobacco: Never Used   Substance and Sexual Activity    Alcohol use: No     Comment: Pt reports some social use of about 1-2 drinks about every six months.    Drug use: No    Sexual activity: Not Currently     Partners: Male     Birth control/protection: None   Other Topics Concern    Not on file   Social History Narrative     [place]    Single    One child    History of blood transfusion in 2004    Potential donor ??? brother         Vital Signs Range (Last 24H):  Temp:  [36.6 °C (97.8 °F)-36.9 °C (98.4 °F)]   Pulse:  []   Resp:  [0-29]   BP: ()/(41-93)   SpO2:  [96 %-100 %]       CBC:   Recent Labs      09/20/18   1103  09/20/18   1557   WBC  15.07*  11.94   RBC  2.57*  2.64*   HGB  7.8*  8.1*   HCT  22.9*  23.3*   PLT  323  316   MCV  89  88   MCH  30.4  30.7   MCHC  34.1  34.8       CMP:   Recent Labs      09/19/18   2247  09/20/18   0330   NA  131*  129*   K  3.8  3.8   CL  90*  94*   CO2  22*  19*   BUN  65*  69*   CREATININE  11.1*  11.3*   GLU  270*  269*   MG   --   1.6   PHOS   --   6.2*   CALCIUM  8.2*  7.4*   ALBUMIN  2.2*  1.9*   PROT  6.6  5.3*   ALKPHOS  141*  115   ALT  <5*  5*   AST  22  22   BILITOT  0.4  0.6       INR  Recent Labs      09/19/18   2247   INR  1.1   APTT  26.3       EKG:  Normal sinus rhythm with sinus arrhythmia  Normal ECG  When compared with ECG of 20-SEP-2018 00:35,  No significant change was found    2D Echo:  CONCLUSIONS     1 - Normal left ventricular systolic function (EF 60-65%).     2 - Normal right ventricular systolic function .     3 - No evidence of intracardiac shunt.     This document has been electronically    SIGNED BY: Joann Connell MD On: 02/24/2017 08:53        Anesthesia Evaluation    I have reviewed the Patient Summary Reports.    I have reviewed the Nursing Notes.   I  have reviewed the Medications.     Review of Systems  Anesthesia Hx:  No problems with previous Anesthesia  History of prior surgery of interest to airway management or planning: Denies Family Hx of Anesthesia complications.   Denies Personal Hx of Anesthesia complications.   Social:  Non-Smoker    Hematology/Oncology:     Oncology Normal    -- Anemia: Anemia of Chronic Kidney Disease  Chronic    EENT/Dental:EENT/Dental Normal   Cardiovascular:   Exercise tolerance: poor Hypertension Past MI CAD  CABG/stent  Stents 2005   Pulmonary:  Pulmonary Normal    Renal/:   Chronic Renal Disease, ESRD, Dialysis Peritomeal dialysys   Musculoskeletal:  Spine Disorders: lumbar Chronic Pain and Disc disease    Neurological:   TIA, CVA, no residual symptoms Headaches Uses scooter to get around  Peripheral Neuropathy    Endocrine:   Diabetes, using insulin    Dermatological:  Skin Normal    Psych:  Psychiatric Normal           Physical Exam  General:  Obesity    Airway/Jaw/Neck:  Airway Findings: Mouth Opening: Normal Tongue: Normal  General Airway Assessment: Adult  Mallampati: IV  TM Distance: Normal, at least 6 cm  Jaw/Neck Findings:  Neck ROM: Normal ROM      Dental:  Dental Findings: In tact             Anesthesia Plan  Type of Anesthesia, risks & benefits discussed:  Anesthesia Type:  general, MAC  Patient's Preference:   Intra-op Monitoring Plan: standard ASA monitors  Intra-op Monitoring Plan Comments:   Post Op Pain Control Plan: multimodal analgesia  Post Op Pain Control Plan Comments:   Induction:   IV  Beta Blocker:  Patient is not currently on a Beta-Blocker (No further documentation required).       Informed Consent: Patient understands risks and agrees with Anesthesia plan.  Questions answered. Anesthesia consent signed with patient.  ASA Score: 3     Day of Surgery Review of History & Physical: I have interviewed and examined the patient. I have reviewed the patient's H&P dated:  There are no significant changes.           Ready For Surgery From Anesthesia Perspective.

## 2018-09-20 NOTE — ASSESSMENT & PLAN NOTE
48 year old female with a history of CAD s/p PCI in 2012 with BMS on DAPT, HTN, HLD, DM II, ESRD on PD, Von Willebrands disease and Factor V Liden on who GI is being consulted for hematochezia. Based on description of bleeding and normal anoscopy her hematochezia could have secondary to a diverticular bleed less likely to be secondary to an upper GI bleed. She is stable this morning with no further bleeding and we will therefore proceed with colonoscopy in the morning.    Recommendations:  --CLD and NPO after MN for colonoscopy in the morning

## 2018-09-20 NOTE — H&P (VIEW-ONLY)
[facility]  Gastroenterology  Consult Note    Patient Name: Jenni Todd  MRN: [id number]  Admission Date: 9/19/2018  Hospital Length of Stay: 0 days  Code Status: Full Code   Attending Provider: Yareli Hobson MD   Consulting Provider: Marquis Villegas MD  Primary Care Physician: Michael Tan Iii, MD  Principal Problem:Acute lower GI bleeding    Inpatient consult to Gastroenterology  Consult performed by: Marquis Villegas MD  Consult ordered by: Teressa Horton NP        Subjective:     HPI:  48 year old female with a history of CAD s/p PCI in 2012 with BMS on DAPT, HTN, HLD, DM II, ESRD on PD, Von Willebrands disease and Factor V Liden on who GI is being consulted for hematochezia.     History provided by patient.  She was recently admitted from 9/11-9/17 for a left femoral fracture.   She was discharge to a SNF and starting yesterday she began to experience hematochezia which she described as bright blood with clots.She denies any near syncope, chest pain, shortness of breath, hematemesis or abdominal pain. At this point she is unsure of the total number of BM but she reports no further episodes since 2:30 am. No prior episodes of hematochezia and thinks that the last time she had Plavix/Pletal was possibly yesterday. On admission she was hypotensive with an H/H of 6.3/18.7 (down from 7.5/23 on 9/17). She has received a total of 4 units of blood as well as 1 of FFP and 1 of PLTS. She has been seen by CRS who reported no bleeding source on anoscopy.  She then had a CTA which did not show active extravasation of blood.             Past Medical History:   Diagnosis Date    Abnormal finding on Pap smear, HPV DNA positive 2014    Anemia associated with chronic renal failure     on Epogen    Blood type B+     Bulging discs - symptomatic      CAD (coronary artery disease)     Diabetes mellitus, type 2     ESRD (end stage renal disease) 04/20/2004    FSGS (focal segmental glomerulosclerosis)      with collapsing    Hyperlipidemia     Hypertension 2004    Neuropathy     Obesity     Secondary hyperparathyroidism, renal     TIA (transient ischemic attack)     Uterine fibroid     small uterine        Past Surgical History:   Procedure Laterality Date    CARDIAC CATHETERIZATION      PCI x 2     SECTION, CLASSIC      DEBRIDEMENT-WOUND Left 2016    [facility]    DIALYSIS FISTULA CREATION      multiple fistulas and grafts before PD     INCISION AND DRAINAGE (I&D), LABIAL N/A 2016    [facility] at [facility] OR    INCISION AND DRAINAGE (I&D), LABIAL (ADD ON) Left 2016    [facility] at [facility] OR    INCISION AND DRAINAGE OF WOUND Left     VULVAR ABCESS WITH NECROSIS    OPEN REDUCTION AND INTERNAL FIXATION (ORIF) OF INJURY OF HIP Left 2018    Procedure: ORIF, HIP;  Surgeon: Tevin Grullon MD;  Location: [facility] OR;  Service: Orthopedics;  Laterality: Left;    ORIF, HIP Left 2018    [facility] at [facility] OR    PERITONEAL CATHETER INSERTION      PERMCATH REWIRE- TUNNELED CATH REWIRE Left 2017    Performed by Baldev Munroe MD at [facility] CATH LAB    PERMCATH REWIRE- TUNNELED CATH REWIRE N/A 10/5/2017    [facility]formed by [facility] at [facility] CATH LAB    UMBILICAL HERNIA REPAIR         Review of patient's allergies indicates:   Allergen Reactions    Clindamycin Diarrhea    Flagyl [metronidazole hcl]      Family History     Problem Relation (Age of Onset)    Cancer Maternal Grandmother, Paternal Grandfather    Diabetes Maternal Aunt, Paternal Aunt        Tobacco Use    Smoking status: Never Smoker    Smokeless tobacco: Never Used   Substance and Sexual Activity    Alcohol use: No     Comment: Pt reports some social use of about 1-2 drinks about every six months.    Drug use: No    Sexual activity: Not Currently     Partners: Male     Birth control/protection:  None     Review of Systems   Constitutional: Negative for activity change, appetite change, chills, diaphoresis, fatigue, fever and unexpected weight change.   HENT: Negative for trouble swallowing and voice change.    Respiratory: Negative.    Cardiovascular: Negative.    Gastrointestinal: Positive for blood in stool. Negative for abdominal distention, abdominal pain, anal bleeding, constipation, diarrhea, nausea, rectal pain and vomiting.   Genitourinary: Negative.    Musculoskeletal: Negative.    Neurological: Negative.    Hematological: Negative.      Objective:     Vital Signs (Most Recent):  Temp: 97.9 °F (36.6 °C) (09/20/18 0915)  Pulse: 96 (09/20/18 0930)  Resp: 13 (09/20/18 0930)  BP: (!) 147/76 (09/20/18 0930)  SpO2: 97 % (09/20/18 0930) Vital Signs (24h Range):  Temp:  [97.8 °F (36.6 °C)-98.4 °F (36.9 °C)] 97.9 °F (36.6 °C)  Pulse:  [87-98] 96  Resp:  [9-21] 13  SpO2:  [97 %-100 %] 97 %  BP: ()/(41-81) 147/76     Weight: 102.1 kg (224 lb 16 oz) (09/20/18 0700)  Body mass index is 35.24 kg/m².      Intake/Output Summary (Last 24 hours) at 9/20/2018 1019  Last data filed at 9/20/2018 0915  Gross per 24 hour   Intake 3366.25 ml   Output --   Net 3366.25 ml       Lines/Drains/Airways     Drain                 Hemodialysis AV Fistula Left upper arm -- days          Arterial Line                 Arterial Line 09/20/18 0305 Right Brachial less than 1 day          Peripheral Intravenous Line                 Peripheral IV - Single Lumen 09/19/18 2318 Left Antecubital less than 1 day         Peripheral IV - Single Lumen 09/19/18 2319 Left Hand less than 1 day         Peripheral IV - Single Lumen 09/19/18 2319 Right Other less than 1 day                Physical Exam   Constitutional: She is oriented to person, place, and time. No distress.   HENT:   Head: Normocephalic and atraumatic.   Eyes: No scleral icterus.   Cardiovascular: Normal rate and regular rhythm.   Pulmonary/Chest: Effort normal and breath  sounds normal.   Abdominal: Bowel sounds are normal. She exhibits distension. She exhibits no mass. There is no rebound and no guarding. No hernia.   Musculoskeletal: She exhibits no edema.   Neurological: She is alert and oriented to person, place, and time.   Skin: She is not diaphoretic.       Significant Labs:  CBC:   Recent Labs   Lab  09/19/18 2247 09/20/18   0330   WBC  10.09  31.20*   HGB  6.3*  6.6*   HCT  18.7*  20.4*   PLT  382*  328     CMP:   Recent Labs   Lab  09/20/18   0330   GLU  269*   CALCIUM  7.4*   ALBUMIN  1.9*   PROT  5.3*   NA  129*   K  3.8   CO2  19*   CL  94*   BUN  69*   CREATININE  11.3*   ALKPHOS  115   ALT  5*   AST  22   BILITOT  0.6     Coagulation:   Recent Labs   Lab  09/19/18 2247   INR  1.1   APTT  26.3       Significant Imaging:  Imaging results within the past 24 hours have been reviewed.    Assessment/Plan:     * Acute lower GI bleeding    48 year old female with a history of CAD s/p PCI in 2012 with BMS on DAPT, HTN, HLD, DM II, ESRD on PD, Von Willebrands disease and Factor V Liden on who GI is being consulted for hematochezia. Based on description of bleeding and normal anoscopy her hematochezia could have secondary to a diverticular bleed less likely to be secondary to an upper GI bleed. She is stable this morning with no further bleeding and we will therefore proceed with colonoscopy in the morning.    Recommendations:  --CLD and NPO after MN for colonoscopy in the morning            Thank you for your consult. I will follow-up with patient. Please contact us if you have any additional questions.    Marquis Villegas M.D.  Gastroenterology Fellow, PGY-V  Pager: [phone]  [facility]

## 2018-09-20 NOTE — ED NOTES
Rectal bleeding which began 45 min ago; [facility] pt; left broken hip; retroperitoneal dialysis; hypotensive upon arrival; endorses abd discomfort

## 2018-09-20 NOTE — CONSULTS
Patient evaluated by and admitted to Critical Care Medicine. Full H&P to follow.     Teressa Horton NP  Critical Care Medicine

## 2018-09-20 NOTE — PLAN OF CARE
Michael Tan Iii, MD  [address] / [place] [address]    [id number] - [place], [place] - [address]  [address]  [facility] [address]  Phone: [phone] Fax: [phone]    [facility] (ESRD Bundle Only) - [place] - [address]  [address]  [place] [address]  Phone: [phone] Fax: [phone]    Payor: [facility] / Plan: [facility] CHOICE PLUS / Product Type: Commercial /     No future appointments.    Extended Emergency Contact Information  Primary Emergency Contact: Sari Schafer  Address: [address] [place] [address] [facility]  Home Phone: [phone]  Work Phone: [phone]  Mobile Phone: [phone]  Relation: Sister  Secondary Emergency Contact: Rock Todd   [facility]  Home Phone: [phone]  Mobile Phone: [phone]  Relation: Father     09/20/18 1434   Discharge Assessment   Assessment Type Discharge Planning Assessment   Confirmed/corrected address and phone number on facesheet? Yes   Assessment information obtained from? Patient   Expected Length of Stay (days) 3   Communicated expected length of stay with patient/caregiver no   Prior to hospitilization cognitive status: Alert/Oriented   Prior to hospitalization functional status: Assistive Equipment;Needs Assistance   Current cognitive status: Alert/Oriented   Current Functional Status: Assistive Equipment;Needs Assistance   Facility Arrived From: [facility]   Lives With child(crystal), adult   Able to Return to Prior Arrangements yes   Is patient able to care for self after discharge? Unable to determine at this time (comments)   Who are your caregiver(s) and their phone number(s)? Rock Todd (Father) [phone]    Patient's perception of discharge disposition rehab facility   Readmission Within The Last 30 Days current reason for admission unrelated to previous admission   If yes, most recent facility name:  [facility]   Patient currently being followed by outpatient case management? No   Patient currently receives home health services? No   Patient currently receives any other outside agency services? No   Equipment Currently Used at Home shower chair;power chair;rollator   Do you have any problems affording any of your prescribed medications? No   Is the patient taking medications as prescribed? yes   Does the patient have transportation home? Yes   Transportation Available family or friend will provide   Dialysis Name and Scheduled days [facility] for PD   Does the patient receive services at the [facility]madin Clinic? No   Discharge Plan A Rehab   Discharge Plan B Home with family;Home Health   Patient/Family In Agreement With Plan yes   Does the patient currently use HME? Yes  (has BP cuff, rollator, electric scooter and SC)   Does the patient receive outpatient dialysis? Yes  (Pt does home dialysis 7 days/wk; 9 hrs/day )   Are there any open cases? No   Kimmie Yi RN, BSN, CCM  Case Management  [facility]  Ext. [phone]

## 2018-09-20 NOTE — PROCEDURES
"Jenni Todd is a 48 y.o. female patient.    Temp: 98.4 °F (36.9 °C) (18)  Pulse: 87 (18)  Resp: 10 (18)  BP: 98/63 (18)  SpO2: 100 % (18)  Weight: 102.1 kg (225 lb) (18)  Height: 5' 7" (170.2 cm) (18)       Arterial Line  Date/Time: 2018 4:58 AM  Location procedure was performed: [facility] CARDIAC MEDICAL ICU (CMICU)  Performed by: Teressa Horton NP  Authorized by: Teressa Horton NP   Pre-op Diagnosis: GI hemorrhage  Post-operative diagnosis: GI hemorrhage  Consent Done: Yes  Consent: Written consent obtained.  Risks and benefits: risks, benefits and alternatives were discussed  Consent given by: patient  Patient understanding: patient states understanding of the procedure being performed  Patient consent: the patient's understanding of the procedure matches consent given  Procedure consent: procedure consent matches procedure scheduled  Relevant documents: relevant documents present and verified  Test results: test results available and properly labeled  Site marked: the operative site was marked  Imaging studies: imaging studies available  Required items: required blood products, implants, devices, and special equipment available  Patient identity confirmed: , MRN, name, provided demographic data and verbally with patient  Time out: Immediately prior to procedure a "time out" was called to verify the correct patient, procedure, equipment, support staff and site/side marked as required.  Preparation: Patient was prepped and draped in the usual sterile fashion.  Indications: hemodynamic monitoring  Location: right brachial  Anesthesia: local infiltration    Anesthesia:  Local Anesthetic: lidocaine 1% without epinephrine  Anesthetic total: 1 mL  Patient sedated: no  Abnormal Albino's test: non-palpable radial with non-pulsitile artery on US. Hand warm.  Needle gauge: 20  Seldinger technique: Seldinger technique used  Number of " attempts: 1  Complications: No  Estimated blood loss (mL): 0  Specimens: No  Implants: No  Post-procedure: line sutured  Post-procedure CMS: unchanged          Teressa Horton  9/20/2018
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
## 2018-09-20 NOTE — SUBJECTIVE & OBJECTIVE
Past Medical History:   Diagnosis Date    Abnormal finding on Pap smear, HPV DNA positive     Anemia associated with chronic renal failure     on Epogen    Blood type B+     Bulging discs - symptomatic      CAD (coronary artery disease)     Diabetes mellitus, type 2     ESRD (end stage renal disease) 2004    FSGS (focal segmental glomerulosclerosis)     with collapsing    Hyperlipidemia     Hypertension     Neuropathy     Obesity     Secondary hyperparathyroidism, renal     TIA (transient ischemic attack)     Uterine fibroid     small uterine        Past Surgical History:   Procedure Laterality Date    CARDIAC CATHETERIZATION      PCI x 2     SECTION, CLASSIC      DEBRIDEMENT-WOUND Left 2016    Performed by [facility]a Maddox MD at [facility] OR    DIALYSIS FISTULA CREATION      multiple fistulas and grafts before PD     INCISION AND DRAINAGE (I&D), LABIAL N/A 2016    Performed by Harmony Fernandez MD at [facility] OR    INCISION AND DRAINAGE (I&D), LABIAL (ADD ON) Left 2016    Performed by Te[facility]a Maddox MD at [facility] OR    INCISION AND DRAINAGE OF WOUND Left     VULVAR ABCESS WITH NECROSIS    OPEN REDUCTION AND INTERNAL FIXATION (ORIF) OF INJURY OF HIP Left 2018    Procedure: ORIF, HIP;  Surgeon: Tevin Grullon MD;  Location: [facility] OR;  Service: Orthopedics;  Laterality: Left;    ORIF, HIP Left 2018    Performed by Tevin [facility]ullon MD at [facility] OR    PERITONEAL CATHETER INSERTION      PERMCATH REWIRE- TUNNELED CATH REWIRE Left 2017    Performed by Baldev Munroe MD at [facility] CATH LAB    PERMCATH REWIRE- TUNNELED CATH REWIRE N/A 10/5/2017    Performed by Baldev Munroe MD at [facility] CATH LAB    UMBILICAL HERNIA REPAIR         Review of patient's allergies indicates:   Allergen Reactions    Clindamycin Diarrhea    Flagyl [metronidazole hcl]      Current Facility-Administered Medications   Medication Frequency    0.9%  NaCl infusion (for blood  administration) Q24H PRN    0.9%  NaCl infusion (for blood administration) Q24H PRN    0.9%  NaCl infusion (for blood administration) Q24H PRN    0.9%  NaCl infusion (for blood administration) Q24H PRN    dextrose 50% injection 12.5 g PRN    glucagon (human recombinant) injection 1 mg PRN    insulin aspart U-100 pen 0-5 Units Q6H PRN    ondansetron injection 4 mg Q8H PRN    pantoprazole injection 40 mg BID    sodium chloride 0.9% flush 3 mL PRN     Family History     Problem Relation (Age of Onset)    Cancer Maternal Grandmother, Paternal Grandfather    Diabetes Maternal Aunt, Paternal Aunt        Tobacco Use    Smoking status: Never Smoker    Smokeless tobacco: Never Used   Substance and Sexual Activity    Alcohol use: No     Comment: Pt reports some social use of about 1-2 drinks about every six months.    Drug use: No    Sexual activity: Not Currently     Partners: Male     Birth control/protection: None     Review of Systems   Constitutional: Negative for activity change, appetite change, chills, diaphoresis, fatigue and fever.   HENT: Negative for congestion, facial swelling, trouble swallowing and voice change.    Respiratory: Negative for apnea, cough and shortness of breath.    Cardiovascular: Negative for chest pain, palpitations and leg swelling.   Gastrointestinal: Positive for anal bleeding and blood in stool. Negative for abdominal distention, constipation, diarrhea, nausea and vomiting.   Musculoskeletal: Positive for arthralgias and gait problem. Negative for myalgias.   Skin: Negative for color change, pallor and rash.   Neurological: Negative for dizziness, light-headedness and headaches.   Psychiatric/Behavioral: Negative for agitation, behavioral problems and confusion.     Objective:     Vital Signs (Most Recent):  Temp: 98.2 °F (36.8 °C) (09/20/18 1200)  Pulse: 98 (09/20/18 1345)  Resp: 13 (09/20/18 1345)  BP: (!) 176/82 (09/20/18 1345)  SpO2: 97 % (09/20/18 1345)  O2 Device  (Oxygen Therapy): room air (09/20/18 0530) Vital Signs (24h Range):  Temp:  [97.8 °F (36.6 °C)-98.4 °F (36.9 °C)] 98.2 °F (36.8 °C)  Pulse:  [] 98  Resp:  [0-29] 13  SpO2:  [96 %-100 %] 97 %  BP: ()/(41-93) 176/82     Weight: 102.1 kg (224 lb 16 oz) (09/20/18 0700)  Body mass index is 35.24 kg/m².  Body surface area is 2.2 meters squared.    I/O last 3 completed shifts:  In: 1124.5 [Blood:1124.5]  Out: -     Physical Exam   Constitutional: She is oriented to person, place, and time. She appears well-developed. No distress.   HENT:   Head: Normocephalic and atraumatic.   Right Ear: External ear normal.   Left Ear: External ear normal.   Eyes: Conjunctivae and EOM are normal. Right eye exhibits no discharge. Left eye exhibits no discharge. No scleral icterus.   Neck: Normal range of motion. Neck supple.   Cardiovascular: Normal rate and regular rhythm. Exam reveals no gallop and no friction rub.   No murmur heard.  Pulmonary/Chest: Effort normal and breath sounds normal. No respiratory distress. She has no wheezes. She has no rales.   Abdominal: Bowel sounds are normal. She exhibits distension. She exhibits no mass. There is no rebound and no guarding. No hernia.   PD cath intact to RLQ   Musculoskeletal: She exhibits no edema.   Neurological: She is alert and oriented to person, place, and time.   Skin: Skin is warm and dry. She is not diaphoretic.   Psychiatric: She has a normal mood and affect. Her behavior is normal.       Significant Labs:  ABGs:   Recent Labs   Lab  09/20/18   0332   PH  7.445   PCO2  36.7   HCO3  25.3   POCSATURATED  99   BE  1     CBC:   Recent Labs   Lab  09/20/18   1103   WBC  15.07*   RBC  2.57*   HGB  7.8*   HCT  22.9*   PLT  323   MCV  89   MCH  30.4   MCHC  34.1     CMP:   Recent Labs   Lab  09/20/18   0330   GLU  269*   CALCIUM  7.4*   ALBUMIN  1.9*   PROT  5.3*   NA  129*   K  3.8   CO2  19*   CL  94*   BUN  69*   CREATININE  11.3*   ALKPHOS  115   ALT  5*   AST  22    BILITOT  0.6

## 2018-09-20 NOTE — ED NOTES
[facility] drop in blood pressure/map. Pt reported becoming lightheaded. Pt was placed in trendelenburg position. Pt axo3. Will continue to monitor.

## 2018-09-20 NOTE — ED NOTES
Pt lying in bed actively passing bright red blood and large clots from the rectum . Pt denies lightheadedness/dizziness at present. Explanation of care/wait provided. Pt v/u. Pt repositioned for comfort. Will continue to monitor.

## 2018-09-20 NOTE — SUBJECTIVE & OBJECTIVE
Past Medical History:   Diagnosis Date    Abnormal finding on Pap smear, HPV DNA positive     Anemia associated with chronic renal failure     on Epogen    Blood type B+     Bulging discs - symptomatic      CAD (coronary artery disease)     Diabetes mellitus, type 2     ESRD (end stage renal disease) 2004    FSGS (focal segmental glomerulosclerosis)     with collapsing    Hyperlipidemia     Hypertension     Neuropathy     Obesity     Secondary hyperparathyroidism, renal     TIA (transient ischemic attack)     Uterine fibroid     small uterine        Past Surgical History:   Procedure Laterality Date    CARDIAC CATHETERIZATION      PCI x 2     SECTION, CLASSIC      DEBRIDEMENT-WOUND Left 2016    Performed by [facility] at [facility] OR    DIALYSIS FISTULA CREATION      multiple fistulas and grafts before PD     INCISION AND DRAINAGE (I&D), LABIAL N/A 2016    Performed by Harmony Fernandez [facility] at [facility] OR    INCISION AND DRAINAGE (I&D), LABIAL (ADD ON) Left 2016    Performed by Te[facility]a Maddox MD at [facility] OR    INCISION AND DRAINAGE OF WOUND Left     VULVAR ABCESS WITH NECROSIS    OPEN REDUCTION AND INTERNAL FIXATION (ORIF) OF INJURY OF HIP Left 2018    Procedure: ORIF, HIP;  Surgeon: Tevin Grullon MD;  Location: [facility] OR;  Service: Orthopedics;  Laterality: Left;    ORIF, HIP Left 2018    Performed by [facility] at [facility] OR    PERITONEAL CATHETER INSERTION      PERMCATH REWIRE- TUNNELED CATH REWIRE Left 2017    Performed by Baldev Munroe MD at [facility] CATH LAB    PERMCATH REWIRE- TUNNELED CATH REWIRE N/A 10/5/2017    Performed by Baldev Munroe MD at [facility] CATH LAB    UMBILICAL HERNIA REPAIR         Review of patient's allergies indicates:   Allergen Reactions    Clindamycin Diarrhea    Flagyl [metronidazole hcl]      Family History     Problem Relation (Age of Onset)    Cancer Maternal Grandmother, Paternal  Grandfather    Diabetes Maternal Aunt, Paternal Aunt        Tobacco Use    Smoking status: Never Smoker    Smokeless tobacco: Never Used   Substance and Sexual Activity    Alcohol use: No     Comment: Pt reports some social use of about 1-2 drinks about every six months.    Drug use: No    Sexual activity: Not Currently     Partners: Male     Birth control/protection: None     Review of Systems   Constitutional: Negative for activity change, appetite change, chills, diaphoresis, fatigue, fever and unexpected weight change.   HENT: Negative for trouble swallowing and voice change.    Respiratory: Negative.    Cardiovascular: Negative.    Gastrointestinal: Positive for blood in stool. Negative for abdominal distention, abdominal pain, anal bleeding, constipation, diarrhea, nausea, rectal pain and vomiting.   Genitourinary: Negative.    Musculoskeletal: Negative.    Neurological: Negative.    Hematological: Negative.      Objective:     Vital Signs (Most Recent):  Temp: 97.9 °F (36.6 °C) (09/20/18 0915)  Pulse: 96 (09/20/18 0930)  Resp: 13 (09/20/18 0930)  BP: (!) 147/76 (09/20/18 0930)  SpO2: 97 % (09/20/18 0930) Vital Signs (24h Range):  Temp:  [97.8 °F (36.6 °C)-98.4 °F (36.9 °C)] 97.9 °F (36.6 °C)  Pulse:  [87-98] 96  Resp:  [9-21] 13  SpO2:  [97 %-100 %] 97 %  BP: ()/(41-81) 147/76     Weight: 102.1 kg (224 lb 16 oz) (09/20/18 0700)  Body mass index is 35.24 kg/m².      Intake/Output Summary (Last 24 hours) at 9/20/2018 1019  Last data filed at 9/20/2018 0915  Gross per 24 hour   Intake 3366.25 ml   Output --   Net 3366.25 ml       Lines/Drains/Airways     Drain                 Hemodialysis AV Fistula Left upper arm -- days          Arterial Line                 Arterial Line 09/20/18 0305 Right Brachial less than 1 day          Peripheral Intravenous Line                 Peripheral IV - Single Lumen 09/19/18 2318 Left Antecubital less than 1 day         Peripheral IV - Single Lumen 09/19/18 2319 Left  Hand less than 1 day         Peripheral IV - Single Lumen 09/19/18 2319 Right Other less than 1 day                Physical Exam   Constitutional: She is oriented to person, place, and time. No distress.   HENT:   Head: Normocephalic and atraumatic.   Eyes: No scleral icterus.   Cardiovascular: Normal rate and regular rhythm.   Pulmonary/Chest: Effort normal and breath sounds normal.   Abdominal: Bowel sounds are normal. She exhibits distension. She exhibits no mass. There is no rebound and no guarding. No hernia.   Musculoskeletal: She exhibits no edema.   Neurological: She is alert and oriented to person, place, and time.   Skin: She is not diaphoretic.       Significant Labs:  CBC:   Recent Labs   Lab  09/19/18 2247  09/20/18   0330   WBC  10.09  31.20*   HGB  6.3*  6.6*   HCT  18.7*  20.4*   PLT  382*  328     CMP:   Recent Labs   Lab  09/20/18   0330   GLU  269*   CALCIUM  7.4*   ALBUMIN  1.9*   PROT  5.3*   NA  129*   K  3.8   CO2  19*   CL  94*   BUN  69*   CREATININE  11.3*   ALKPHOS  115   ALT  5*   AST  22   BILITOT  0.6     Coagulation:   Recent Labs   Lab  09/19/18 2247   INR  1.1   APTT  26.3       Significant Imaging:  Imaging results within the past 24 hours have been reviewed.

## 2018-09-20 NOTE — PLAN OF CARE
Problem: Patient Care Overview  Goal: Plan of Care Review  The patient and family verbalize an understanding of the plan of care.  No BM's today.  Blood products adminstered.  Family at the bedside.  Bowel prep tonight; colonoscopy tomorrow.  H&H stable.

## 2018-09-20 NOTE — SUBJECTIVE & OBJECTIVE
Past Medical History:   Diagnosis Date    Abnormal finding on Pap smear, HPV DNA positive     Anemia associated with chronic renal failure     on Epogen    Blood type B+     Bulging discs - symptomatic      CAD (coronary artery disease)     Diabetes mellitus, type 2     ESRD (end stage renal disease) 2004    FSGS (focal segmental glomerulosclerosis)     with collapsing    Hyperlipidemia     Hypertension     Neuropathy     Obesity     Secondary hyperparathyroidism, renal     TIA (transient ischemic attack)     Uterine fibroid     small uterine        Past Surgical History:   Procedure Laterality Date    CARDIAC CATHETERIZATION      PCI x 2     SECTION, CLASSIC      DEBRIDEMENT-WOUND Left 2016    Performed by [facility]a Maddox MD at [facility] OR    DIALYSIS FISTULA CREATION      multiple fistulas and grafts before PD     INCISION AND DRAINAGE (I&D), LABIAL N/A 2016    Performed by Harmony Fernandez MD at [facility] OR    INCISION AND DRAINAGE (I&D), LABIAL (ADD ON) Left 2016    Performed by Teressa Maddox MD at [facility] OR    INCISION AND DRAINAGE OF WOUND Left     VULVAR ABCESS WITH NECROSIS    OPEN REDUCTION AND INTERNAL FIXATION (ORIF) OF INJURY OF HIP Left 2018    Procedure: ORIF, HIP;  Surgeon: Tevin Grullon MD;  Location: [facility] OR;  Service: Orthopedics;  Laterality: Left;    ORIF, HIP Left 2018    Performed by Tevin Grullon MD at [facility] OR    PERITONEAL CATHETER INSERTION      PERMCATH REWIRE- TUNNELED CATH REWIRE Left 2017    Performed by Baldev Munroe MD at [facility] CATH LAB    PERMCATH REWIRE- TUNNELED CATH REWIRE N/A 10/5/2017    Performed by Baldev Munroe MD at [facility] CATH LAB    UMBILICAL HERNIA REPAIR         Review of patient's allergies indicates:   Allergen Reactions    Clindamycin Diarrhea    Flagyl [metronidazole hcl]        Family History     Problem Relation (Age of Onset)    Cancer Maternal Grandmother, Paternal  Grandfather    Diabetes Maternal Aunt, Paternal Aunt        Tobacco Use    Smoking status: Never Smoker    Smokeless tobacco: Never Used   Substance and Sexual Activity    Alcohol use: No     Comment: Pt reports some social use of about 1-2 drinks about every six months.    Drug use: No    Sexual activity: Not Currently     Partners: Male     Birth control/protection: None      Review of Systems   Constitutional: Negative for chills, fatigue and fever.   HENT: Negative for drooling, sore throat and trouble swallowing.    Eyes: Negative for photophobia and visual disturbance.   Respiratory: Negative for cough, chest tightness, shortness of breath and wheezing.    Cardiovascular: Negative for chest pain, palpitations and leg swelling.   Gastrointestinal: Positive for blood in stool and constipation. Negative for abdominal distention, nausea and vomiting.        Abdominal cramping   Endocrine: Negative.    Genitourinary:        Anuric     Musculoskeletal: Positive for arthralgias and gait problem. Negative for neck pain and neck stiffness.   Skin: Negative.    Allergic/Immunologic: Negative.    Neurological: Positive for dizziness and light-headedness. Negative for seizures, syncope and weakness.   Hematological:        Factor 5 liden, von willebrands disease   Psychiatric/Behavioral: Negative.      Objective:     Vital Signs (Most Recent):  Temp: 98.4 °F (36.9 °C) (09/20/18 0203)  Pulse: 87 (09/20/18 0332)  Resp: 10 (09/20/18 0332)  BP: 98/63 (09/20/18 0332)  SpO2: 100 % (09/20/18 0332) Vital Signs (24h Range):  Temp:  [97.8 °F (36.6 °C)-98.4 °F (36.9 °C)] 98.4 °F (36.9 °C)  Pulse:  [87-97] 87  Resp:  [10-20] 10  SpO2:  [99 %-100 %] 100 %  BP: ()/(41-79) 98/63   Weight: 102.1 kg (225 lb)  Body mass index is 35.24 kg/m².      Intake/Output Summary (Last 24 hours) at 9/20/2018 0505  Last data filed at 9/20/2018 0203  Gross per 24 hour   Intake 1124.5 ml   Output --   Net 1124.5 ml       Physical  Exam    Vents:     Lines/Drains/Airways     Drain                 Hemodialysis AV Fistula Left upper arm -- days          Arterial Line                 Arterial Line 09/20/18 0305 Right Brachial less than 1 day          Peripheral Intravenous Line                 Peripheral IV - Single Lumen 09/19/18 2318 Left Antecubital less than 1 day         Peripheral IV - Single Lumen 09/19/18 2319 Left Hand less than 1 day         Peripheral IV - Single Lumen 09/19/18 2319 Right Other less than 1 day              Significant Labs:    CBC/Anemia Profile:  Recent Labs   Lab  09/19/18 2247 09/20/18   0330   WBC  10.09  31.20*   HGB  6.3*  6.6*   HCT  18.7*  20.4*   PLT  382*  328   MCV  99*  93   RDW  15.4*  16.8*        Chemistries:  Recent Labs   Lab  09/19/18 2247 09/20/18   0330   NA  131*  129*   K  3.8  3.8   CL  90*  94*   CO2  22*  19*   BUN  65*  69*   CREATININE  11.1*  11.3*   CALCIUM  8.2*  7.4*   ALBUMIN  2.2*  1.9*   PROT  6.6  5.3*   BILITOT  0.4  0.6   ALKPHOS  141*  115   ALT  <5*  5*   AST  22  22   MG   --   1.6   PHOS   --   6.2*       All pertinent labs within the past 24 hours have been reviewed.    Significant Imaging: I have reviewed and interpreted all pertinent imaging results/findings within the past 24 hours.

## 2018-09-21 NOTE — PROGRESS NOTES
CCPD tx completed. 741 total UF with clear yellow effluent. Tolerated well. Pt disconnected from cycler.

## 2018-09-21 NOTE — PT/OT/SLP PROGRESS
Occupational Therapy      Patient Name:  Jenni Todd   MRN:  [id number]    Patient not seen today due to off unit for colonoscopy. OT to attempt at later date.   SABRA Mahoney  9/21/2018

## 2018-09-21 NOTE — SUBJECTIVE & OBJECTIVE
Interval History:  Tolerated CCPD overnight with net UF of 744 ml.  Net positive 1.8L/24h, but had diarrhea with her colon prep.  She denies c/o SOB this morning.  Scheduled for colonoscopy this morning, receiving prophy abx pre-procedure.    Review of patient's allergies indicates:   Allergen Reactions    Clindamycin Diarrhea    Flagyl [metronidazole hcl]      Current Facility-Administered Medications   Medication Frequency    0.9%  NaCl infusion (for blood administration) Q24H PRN    0.9%  NaCl infusion (for blood administration) Q24H PRN    0.9%  NaCl infusion (for blood administration) Q24H PRN    0.9%  NaCl infusion (for blood administration) Q24H PRN    dextrose 50% injection 12.5 g PRN    glucagon (human recombinant) injection 1 mg PRN    insulin aspart U-100 pen 0-5 Units Q6H PRN    labetalol injection 10 mg Q4H PRN    ondansetron injection 4 mg Q8H PRN    pantoprazole injection 40 mg BID    sodium chloride 0.9% flush 3 mL PRN       Objective:     Vital Signs (Most Recent):  Temp: 98 °F (36.7 °C) (09/21/18 0400)  Pulse: 92 (09/21/18 0815)  Resp: 14 (09/21/18 0815)  BP: (!) 180/83 (09/21/18 0600)  SpO2: 100 % (09/21/18 0815)  O2 Device (Oxygen Therapy): nasal cannula (09/21/18 0600) Vital Signs (24h Range):  Temp:  [97.9 °F (36.6 °C)-98.2 °F (36.8 °C)] 98 °F (36.7 °C)  Pulse:  [] 92  Resp:  [3-32] 14  SpO2:  [93 %-100 %] 100 %  BP: (128-192)/(59-93) 180/83     Weight: 102.1 kg (224 lb 16 oz) (09/21/18 0700)  Body mass index is 35.24 kg/m².  Body surface area is 2.2 meters squared.    I/O last 3 completed shifts:  In: 3628.8 [Blood:3628.8]  Out: 744 [Other:744]    Physical Exam   Constitutional: She is oriented to person, place, and time. She appears well-developed. No distress. Nasal cannula in place.   HENT:   Head: Normocephalic and atraumatic.   Right Ear: External ear normal.   Left Ear: External ear normal.   Eyes: Conjunctivae and EOM are normal. Right eye exhibits no discharge. Left  eye exhibits no discharge. No scleral icterus.   Neck: Normal range of motion. Neck supple.   Cardiovascular: Normal rate and regular rhythm. Exam reveals no gallop and no friction rub.   No murmur heard.  Pulmonary/Chest: Effort normal and breath sounds normal. No respiratory distress. She has no wheezes. She has no rales.   Abdominal: Bowel sounds are normal. She exhibits distension. She exhibits no mass. There is no rebound and no guarding. No hernia.   PD cath intact to RLQ   Musculoskeletal: She exhibits no edema.   Neurological: She is alert and oriented to person, place, and time.   Skin: Skin is warm and dry. She is not diaphoretic.   Psychiatric: She has a normal mood and affect. Her behavior is normal.       Significant Labs:  CBC:   Recent Labs   Lab  09/21/18   0500   WBC  10.63   RBC  2.69*   HGB  8.2*   HCT  24.1*   PLT  314   MCV  90   MCH  30.5   MCHC  34.0     CMP:   Recent Labs   Lab  09/21/18   0500   GLU  220*   CALCIUM  8.3*   ALBUMIN  2.5*   PROT  6.6   NA  128*   K  4.8   CO2  20*   CL  92*   BUN  66*   CREATININE  10.5*   ALKPHOS  135   ALT  5*   AST  32   BILITOT  0.5

## 2018-09-21 NOTE — HOSPITAL COURSE
Upon transfer to MICU, patient received additional 2 unit of PRBC, 2 FFP, and 1 platelets. Hemoglobin at goal > 7 since last transfusion. No more episodes of bloody bowel movements. Colonoscopy 9/21 identified an ulcer in the rectum with visible vessel that was clipped. Likely the source of bleed.

## 2018-09-21 NOTE — SUBJECTIVE & OBJECTIVE
Interval History/Significant Events: No acute events overnight. Patient made NPO at midnight and was currently prepping for colonoscopy today.     Review of Systems   Constitutional: Negative for chills and fever.   HENT: Negative for congestion and sore throat.    Respiratory: Negative for cough, chest tightness and shortness of breath.    Cardiovascular: Negative for chest pain and palpitations.   Gastrointestinal: Negative for abdominal distention, abdominal pain, nausea and vomiting.   Genitourinary:        Anuric on peritoneal dialysis   Musculoskeletal: Negative for back pain and joint swelling.   Skin: Negative for rash.   Neurological: Negative for dizziness, weakness, light-headedness and headaches.   Psychiatric/Behavioral: Negative for agitation. The patient is not nervous/anxious.      Objective:     Vital Signs (Most Recent):  Temp: 97.9 °F (36.6 °C) (09/21/18 1100)  Pulse: 97 (09/21/18 1315)  Resp: 14 (09/21/18 1315)  BP: (!) 142/64 (09/21/18 1300)  SpO2: 100 % (09/21/18 1315) Vital Signs (24h Range):  Temp:  [97.9 °F (36.6 °C)-98.2 °F (36.8 °C)] 97.9 °F (36.6 °C)  Pulse:  [] 97  Resp:  [4-41] 14  SpO2:  [93 %-100 %] 100 %  BP: (128-192)/(59-92) 142/64   Weight: 102.1 kg (224 lb 16 oz)  Body mass index is 35.24 kg/m².      Intake/Output Summary (Last 24 hours) at 9/21/2018 1511  Last data filed at 9/21/2018 1156  Gross per 24 hour   Intake 100 ml   Output 794 ml   Net -694 ml       Physical Exam   Constitutional: She is oriented to person, place, and time. She appears well-developed and well-nourished. She is cooperative. No distress.   HENT:   Head: Normocephalic and atraumatic.   Eyes: EOM are normal. Pupils are equal, round, and reactive to light.   Neck: Normal range of motion and full passive range of motion without pain. Neck supple.   Cardiovascular: Normal rate, regular rhythm and intact distal pulses.   Murmur heard.   Crescendo decrescendo systolic murmur is present with a grade of  3/6.  Pulses:       Dorsalis pedis pulses are 2+ on the right side, and 2+ on the left side.   Pulmonary/Chest: No accessory muscle usage or stridor. No tachypnea and no bradypnea. No respiratory distress. She has no decreased breath sounds. She has no wheezes. She has no rhonchi. She has no rales.   Abdominal: Bowel sounds are normal. She exhibits no distension. There is no tenderness.       Neurological: She is alert and oriented to person, place, and time. She has normal strength. No sensory deficit.   Skin: Skin is warm, dry and intact.   Psychiatric: She has a normal mood and affect. Her speech is normal and behavior is normal. Thought content normal.       Vents:     Lines/Drains/Airways     Drain                 Hemodialysis AV Fistula Left upper arm -- days          Peripheral Intravenous Line                 Peripheral IV - Single Lumen 09/19/18 2318 Left Antecubital 1 day         Peripheral IV - Single Lumen 09/19/18 2319 Left Hand 1 day         Peripheral IV - Single Lumen 09/19/18 2319 Right Other 1 day              Significant Labs:    CBC/Anemia Profile:  Recent Labs   Lab  09/20/18   2346  09/21/18   0500  09/21/18   1213   WBC  10.94  10.63  10.01   HGB  7.9*  8.2*  7.5*   HCT  23.4*  24.1*  22.5*   PLT  326  314  298   MCV  88  90  91   RDW  17.4*  17.5*  17.2*        Chemistries:  Recent Labs   Lab  09/19/18   2247  09/20/18   0330  09/21/18   0500   NA  131*  129*  128*   K  3.8  3.8  4.8   CL  90*  94*  92*   CO2  22*  19*  20*   BUN  65*  69*  66*   CREATININE  11.1*  11.3*  10.5*   CALCIUM  8.2*  7.4*  8.3*   ALBUMIN  2.2*  1.9*  2.5*   PROT  6.6  5.3*  6.6   BILITOT  0.4  0.6  0.5   ALKPHOS  141*  115  135   ALT  <5*  5*  5*   AST  22  22  32   MG   --   1.6  2.3   PHOS   --   6.2*  5.4*       All pertinent labs within the past 24 hours have been reviewed.    Significant Imaging:  I have reviewed and interpreted all pertinent imaging results/findings within the past 24 hours.
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
## 2018-09-21 NOTE — PLAN OF CARE
Problem: Patient Care Overview  Goal: Plan of Care Review  Outcome: Ongoing (interventions implemented as appropriate)  The patient verbalizes an understanding of the plan of care.  Bedside colonoscopy this morning.  Stable H&H.  Diet and transfer to the floor ordered.

## 2018-09-21 NOTE — PLAN OF CARE
Problem: Patient Care Overview  Goal: Plan of Care Review  Outcome: Ongoing (interventions implemented as appropriate)  No acute events over night, see assessments, pt with several bms, no sada blood noted in stool. PD completed and dialysis RN disconnected pt from dialysis machine. Pt currently drinking Golyte. poc ongoing

## 2018-09-21 NOTE — ASSESSMENT & PLAN NOTE
--nightly PD, last session 9/19 however patient only completed portion of session  --nephrology following  --peritoneal dialysis overnight with UF of 741  --Nephro recommended ppx abx for peritonitis prior to colonoscopy today.   --CPPD tonight

## 2018-09-21 NOTE — [employer] PATIENT INSTRUCTIONS
Discharge Summary/Instructions after an Endoscopic Procedure  Patient Name: Jenni Todd  Patient MRN: [id number]  Patient YOB: 1970  Friday, September 21, 2018  Rodrigue Russell MD  RESTRICTIONS:  During your procedure today, you received medications for sedation.  These   medications may affect your judgment, balance and coordination.  Therefore,   for 24 hours, you have the following restrictions:   - DO NOT drive a car, operate machinery, make legal/financial decisions,   sign important papers or drink alcohol.    ACTIVITY:  Today: no heavy lifting, straining or running due to procedural   sedation/anesthesia.  The following day: return to full activity including work.  DIET:  Eat and drink normally unless instructed otherwise.     TREATMENT FOR COMMON SIDE EFFECTS:  - Mild abdominal pain, nausea, belching, bloating or excessive gas:  rest,   eat lightly and use a heating pad.  - Sore Throat: treat with throat lozenges and/or gargle with warm salt   water.  - Because air was used during the procedure, expelling large amounts of air   from your rectum or belching is normal.  - If a bowel prep was taken, you may not have a bowel movement for 1-3 days.    This is normal.  SYMPTOMS TO WATCH FOR AND REPORT TO YOUR PHYSICIAN:  1. Abdominal pain or bloating, other than gas cramps.  2. Chest pain.  3. Back pain.  4. Signs of infection such as: chills or fever occurring within 24 hours   after the procedure.  5. Rectal bleeding, which would show as bright red, maroon, or black stools.   (A tablespoon of blood from the rectum is not serious, especially if   hemorrhoids are present.)  6. Vomiting.  7. Weakness or dizziness.  GO DIRECTLY TO THE NEAREST EMERGENCY ROOM IF YOU HAVE ANY OF THE FOLLOWING:      Difficulty breathing              Chills and/or fever over 101 F   Persistent vomiting and/or vomiting blood   Severe abdominal pain   Severe chest pain   Black, tarry stools   Bleeding- more than one  tablespoon   Any other symptom or condition that you feel may need urgent attention  Your doctor recommends these additional instructions:  If any biopsies were taken, your doctors clinic will contact you in 1 to 2   weeks with any results.  - Observe patient in the hospital.   - Advance diet as tolerated.   - Continue present medications.   - Resume Plavix (clopidogrel) and Pletal at prior doses tomorrow.   - Repeat colonoscopy in 3 years for surveillance.   For questions, problems or results please call your physician - Rodrigue Russell MD at Work:  [phone].  [facility], EMERGENCY ROOM PHONE NUMBER: [phone]  IF A COMPLICATION OR EMERGENCY SITUATION ARISES AND YOU ARE UNABLE TO REACH   YOUR PHYSICIAN - GO DIRECTLY TO THE EMERGENCY ROOM.  Rodrigue Russell MD  9/21/2018 2:03:48 PM  This report has been verified and signed electronically.  [employer]

## 2018-09-21 NOTE — TREATMENT PLAN
Treatment Plan  09/21/2018  2:08 PM    Colonoscopy completed with impression and recs below.    Impression:             - One 14 mm polyp in the cecum, removed with a hot snare. Resected and retrieved. Endoloop used.  - A few erosions in the rectum.  - An ulcer in the rectum with visible vessel. Clips (MR conditional) were placed.  - The examination was otherwise normal on direct and retroflexion views.  - She does not have diverticula.  - The examined portion of the ileum was normal.    Recommendation:         - Observe patient in the hospital.  - Advance diet as tolerated.  - Continue present medications.  - Resume Plavix (clopidogrel) and Pletal at prior doses tomorrow.  - Repeat colonoscopy in 3 years for surveillance.    Marquis Villegas M.D.  Gastroenterology Fellow, PGY-V  Pager: [phone]  [facility]
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
## 2018-09-21 NOTE — ASSESSMENT & PLAN NOTE
"--per heme/onc: patient does not have VWD or factor V leiden.  "Factor V Leiden- Normal genotype.  Beta-2 Glyco 1 IgA- Normal   Factor VIII Activity- 234  Von willebrand - workup is normal. The distribution of plasma von Willebrand factor (VWF) multimers is normal. Von Willebrand Ag- 287  Even though a hexagonal phospholipid test is positive, her lupus anticoagulant/DRVVT is negative - making this an essentially negative test result.  She is heterozygous for C677T but has normal homocysteine levels - another negative test for hypercoagulable state."      "

## 2018-09-21 NOTE — TRANSFER OF CARE
"Anesthesia Transfer of Care Note    Patient: Jenni Todd    Procedure(s) Performed: Procedure(s) (LRB):  COLONOSCOPY (N/A)    Patient location: ICU    Anesthesia Type: general    Transport from OR: Transported from OR on 2-3 L/min O2 by NC with adequate spontaneous ventilation    Post pain: adequate analgesia    Post assessment: no apparent anesthetic complications and tolerated procedure well    Post vital signs: stable    Level of consciousness: awake and alert    Nausea/Vomiting: no nausea/vomiting    Complications: none    Transfer of care protocol was followedComments: Report given to rn at bedside      Last vitals:   Visit Vitals  BP (!) 180/83 (BP Location: Right arm, Patient Position: Lying)   Pulse 92   Temp 36.7 °C (98 °F) (Oral)   Resp 14   Ht 5' 7" (1.702 m)   Wt 102.1 kg (224 lb 16 oz)   LMP 01/28/2014 (Approximate)   SpO2 100%   BMI 35.24 kg/m²     "

## 2018-09-21 NOTE — ASSESSMENT & PLAN NOTE
ESRD on CCPD  Dr. Shukri Barragan-[facility]  9 hours  2.5L fills volumes  2L last fill  4 cycles  No residual renal function  2.5% dextrose    Plan/Recommendations:  If remains in house, will provide CCPD tonight  Will add 4.25% dextrose solution alternating with 2.5% dextrose to improve ultrafiltration    Anemia of ESRD  Complicated by acute GI bleed  PRBC transfusion per primary team.  Will start on BUSHRA TIW    BMM  Phos elevated.  Renal diet when able to tolerate PO  Renvela 1600 mg PO TID with meals when able  Would start on protein supplementation with meals (Nepro/beneprotein)
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
## 2018-09-21 NOTE — INTERVAL H&P NOTE
Pre-Procedure H and P Addendum    Patient seen and examined.  History and exam unchanged from prior history and physical.      Procedure: Colonoscopy   Indication: Hematochezia  ASA Class: per anesthesiology  Airway: normal  Neck Mobility: full range of motion  Mallampatti score: per anesthesia  History of anesthesia problems: no  Family history of anesthesia problems: no  Anesthesia Plan: MAC    Anesthesia/Surgery risks, benefits and alternative options discussed and understood by patient/family.          Active Hospital Problems    Diagnosis  POA    *Acute GI bleeding [K92.2]  Unknown    Chronic kidney disease-mineral and bone disorder [N18.9, E83.9, M89.9]  Unknown    Encounter for CAPD (continuous ambulatory peritoneal dialysis) [Z99.2]  Not Applicable    Closed fracture of left hip with routine healing [S72.002D]  Not Applicable    Type 2 diabetes 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 [E11.22, N18.6, Z99.2]  Not Applicable    PAD (peripheral artery disease) [I73.9]  Yes    Coronary artery disease involving native coronary artery of native heart without angina pectoris [I25.10]  Yes    Factor 5 Leiden mutation, heterozygous [D68.51]  Yes    Von Willebrand disease [D68.0]  Yes    Hypertension associated with diabetes [E11.59, I10]  Yes    ESRD (end stage renal disease) on PD ( initiated dialysis 04/20/2004) [N18.6]  Yes     Nightly PD        Resolved Hospital Problems   No resolved problems to display.

## 2018-09-21 NOTE — ANESTHESIA POSTPROCEDURE EVALUATION
"Anesthesia Post Evaluation    Patient: Jenni Todd    Procedure(s) Performed: Procedure(s) (LRB):  COLONOSCOPY (N/A)      Patient location during evaluation: ICU  Patient participation: Yes- Able to Participate  Level of consciousness: awake and alert  Post-procedure vital signs: reviewed and stable  Pain management: adequate  Airway patency: patent  PONV status at discharge: No PONV  Anesthetic complications: no      Cardiovascular status: blood pressure returned to baseline  Respiratory status: unassisted  Hydration status: euvolemic  Follow-up not needed.        Visit Vitals  BP (!) 180/83 (BP Location: Right arm, Patient Position: Lying)   Pulse 92   Temp 36.7 °C (98 °F) (Oral)   Resp 14   Ht 5' 7" (1.702 m)   Wt 102.1 kg (224 lb 16 oz)   LMP 01/28/2014 (Approximate)   SpO2 100%   BMI 35.24 kg/m²       Pain/Yuridia Score: Pain Assessment Performed: Yes (9/21/2018  7:00 AM)  Presence of Pain: denies (9/21/2018  7:00 AM)  Yuridia Score: 9 (9/21/2018  7:00 AM)        "

## 2018-09-21 NOTE — ASSESSMENT & PLAN NOTE
--multiple episodes of BRBPR on admission; no bloody bowel movements since that time  --CRS performed bedside anoscopy in ED; no evidence of bleeding hemorrhoids  --CTA without evidence of active bleed  --colonoscopy today showed ulcer in the rectum with visible vessel that was clipped. 14 mm polyp was also removed  --Hemoglobin stable overnight; transfusing for goal hgb < 7   --spaced CBC Q8  --protonix BID  --advancing diet as tolerated

## 2018-09-22 NOTE — PROGRESS NOTES
9-hr CCPD initiated using strict aseptic technique post room transfer to 6092. 2L set on last fill. 178 ml initial drain with clear yellow effluent.
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
## 2018-09-22 NOTE — TREATMENT PLAN
Treatment Plan  09/22/2018  6:53 PM    Patient s/p colonoscopy with no reported bloody BM this morning and stable hemoglobin.     We thank you for this consultation, we will sign off at this time, please call us with any additional questions.    Marquis Villegas M.D.  Gastroenterology Fellow, PGY-V  Pager: [phone]  [facility] [address]
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
## 2018-09-22 NOTE — PROGRESS NOTES
Dialysis          CCPD tx completed. Total  ml of clear yellow effluent. Cycler d/c'ed using aseptic technique, catheter capped and given to pt. For securement.
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
## 2018-09-22 NOTE — SUBJECTIVE & OBJECTIVE
Interval History:  Patient feeling well, denies any shortness of breath, nausea, vomiting, dysuria or hematuria. No sign of GI bleed.     Review of patient's allergies indicates:   Allergen Reactions    Clindamycin Diarrhea    Flagyl [metronidazole hcl]      Current Facility-Administered Medications   Medication Frequency    amLODIPine tablet 5 mg Daily    carvedilol tablet 3.125 mg BID    dextrose 50% injection 12.5 g PRN    dextrose 50% injection 25 g PRN    glucagon (human recombinant) injection 1 mg PRN    glucose chewable tablet 16 g PRN    glucose chewable tablet 24 g PRN    insulin aspart U-100 pen 0-5 Units QID (AC + HS) PRN    insulin detemir U-100 pen 10 Units Daily    labetalol injection 10 mg Q4H PRN    ondansetron injection 4 mg Q8H PRN    pantoprazole injection 40 mg BID    sevelamer carbonate tablet 1,600 mg TID WM    sodium chloride 0.9% flush 3 mL PRN       Objective:     Vital Signs (Most Recent):  Temp: 98.3 °F (36.8 °C) (09/22/18 0734)  Pulse: 85 (09/22/18 0734)  Resp: 16 (09/22/18 0734)  BP: 134/70 (09/22/18 0734)  SpO2: 100 % (09/22/18 0734)  O2 Device (Oxygen Therapy): room air (09/22/18 0734) Vital Signs (24h Range):  Temp:  [97.9 °F (36.6 °C)-98.6 °F (37 °C)] 98.3 °F (36.8 °C)  Pulse:  [] 85  Resp:  [8-41] 16  SpO2:  [95 %-100 %] 100 %  BP: (128-190)/(61-86) 134/70     Weight: 102.1 kg (224 lb 16 oz) (09/21/18 0700)  Body mass index is 35.24 kg/m².  Body surface area is 2.2 meters squared.    I/O last 3 completed shifts:  In: 160 [P.O.:60; I.V.:100]  Out: 794 [Other:744; Blood:50]    Physical Exam   Constitutional: She is oriented to person, place, and time. She appears well-developed. No distress. Nasal cannula in place.   HENT:   Head: Normocephalic and atraumatic.   Right Ear: External ear normal.   Left Ear: External ear normal.   Eyes: Conjunctivae and EOM are normal. Right eye exhibits no discharge. Left eye exhibits no discharge. No scleral icterus.   Neck:  Normal range of motion. Neck supple.   Cardiovascular: Normal rate and regular rhythm. Exam reveals no gallop and no friction rub.   No murmur heard.  Pulmonary/Chest: Effort normal and breath sounds normal. No respiratory distress. She has no wheezes. She has no rales.   Abdominal: Bowel sounds are normal. She exhibits distension. There is no guarding.   PD cath intact to RLQ   Musculoskeletal: She exhibits no edema.   Neurological: She is alert and oriented to person, place, and time.   Skin: Skin is warm and dry. She is not diaphoretic.   Psychiatric: She has a normal mood and affect. Her behavior is normal.       Significant Labs:  ABGs:   Recent Labs   Lab  09/20/18   0332   PH  7.445   PCO2  36.7   HCO3  25.3   POCSATURATED  99   BE  1     BMP:   Recent Labs   Lab  09/22/18   0809   GLU  256*   CL  92*   CO2  22*   BUN  61*   CREATININE  11.0*   CALCIUM  9.0   MG  1.9     CBC:   Recent Labs   Lab  09/21/18   2052   WBC  8.86   RBC  2.44*   HGB  7.3*   HCT  21.5*   PLT  330   MCV  88   MCH  29.9   MCHC  34.0     CMP:   Recent Labs   Lab  09/22/18   0809   GLU  256*   CALCIUM  9.0   ALBUMIN  2.7*   PROT  6.7   NA  130*   K  3.9   CO2  22*   CL  92*   BUN  61*   CREATININE  11.0*   ALKPHOS  127   ALT  <5*   AST  18   BILITOT  0.5     All labs within the past 24 hours have been reviewed.

## 2018-09-22 NOTE — ASSESSMENT & PLAN NOTE
Recent Labs      09/20/18   1113  09/20/18   2352  09/21/18   0457  09/21/18   2033  09/22/18   0736  09/22/18   1232   POCTGLUCOSE  189*  306*  216*  177*  232*  221*

## 2018-09-22 NOTE — NURSING TRANSFER
Nursing Transfer Note      9/21/2018     Transfer To: [id number]    Transfer via bed    Transfer with O2, cardiac monitoring    Transported by [facility]RIST[facility]varado    Medicines sent: insulin    Chart send with patient: Yes    Notified: pt to notify    Patient reassessed at: 09/21/2018 6189    Upon arrival to floor: cardiac monitor applied, patient oriented to room, call bell in reach and bed in lowest position

## 2018-09-22 NOTE — PT/OT/SLP EVAL
Physical Therapy Evaluation    Patient Name:  Jenni Todd   MRN:  [id number]    Recommendations:     Discharge Recommendations:  (return to inpatient facility)   Discharge Equipment Recommendations: (TBD)   Barriers to discharge: Inaccessible home and Decreased caregiver support    Assessment:     Jenni Todd is a 48 y.o. female admitted with a medical diagnosis of Acute GI bleeding.  She presents with the following impairments/functional limitations:  weakness, impaired endurance, orthopedic precautions, impaired functional mobilty, impaired balance, gait instability Patient tolerated evaluation  well. Patient limited at this time by increased pain and fatigue with gait trial. Pt was able to walk x 16 feet with TTWB/NWB to LLE and adhering to precautions well. PTA pt was recieivng therapy in an inpatient facility and would be most appropriate to return to [facility] with skilled PT services prior to return home.Patient would benefit from an inpatient rehabilitation therapy stay to address the impairments listed above in a multidisciplinary, therapy intense setting. Further therapy is required in order to facilitate  patients return to prior level of function as independent as possible, decrease caregiver burden and reduce risk of falls.     .    Rehab Prognosis:  good; patient would benefit from acute skilled PT services to address these deficits and reach maximum level of function.      Recent Surgery: Procedure(s) (LRB):  COLONOSCOPY (N/A) 1 Day Post-Op    Plan:     During this hospitalization, patient to be seen   to address the above listed problems via gait training, therapeutic activities, therapeutic exercises, neuromuscular re-education  · Plan of Care Expires:  10/21/18   Plan of Care Reviewed with: patient    Subjective     Communicated with RN prior to session.  Patient found supine upon PT entry to room, agreeable to evaluation.      Chief Complaint: pain; pt states she wants to leave the  hosptial  Patient comments/goals: to return to rehab  Pain/Comfort:  · Pain Rating 1: (L hip; not rated)    Patients cultural, spiritual, Jewish conflicts given the current situation: none stated    Living Environment:  Pt lives in a [facility] with on [address]   Prior to admission, patients level of function was mod ( I ) working, driving and using power chair for vacations and at work. .  Patient has the following equipment: (power chair).  DME owned (not currently used):cane, straight, shower chair.  Upon discharge, patient will have assistance from family.    Objective:     Patient found with: (peripheral IV; CRRT)     General Precautions: Standard, (diabetic; fall)   Orthopedic Precautions:LLE toe touch weight bearing   Braces: N/A       Exams    Cognition:  Patient is Oriented X 4, Alert and Cooperative  Patient Follows multistep  commands 100 % of the time.     Coordination  · WFL  · Other noted coordination deficits: none    Sensation  Patient's sensation was Intact to light touch  bilaterally at LE  Pt endorses numbness and tingling in B hands and LE ( chronic )    Skin integrity     · -       Skin integrity: Visible skin intact  · -       Edema: Moderate L hip     Posture  · -       Rounded shoulders  · -       Forward head    ROM and Strength   LLE MMT RLE MMT   Hip flexion 3-/5 with pain 4-/5   Knee ext 3/5 4/5   Knee flex 3+/5 4/5   Ankle DF 3+/5 4-/5   Ankle PF 3+/5 4/5   Other          LLE ROM RLE ROM   Hip flexion WFL with passive ROM; pain with active WFL   Knee ext WFL with pain WFL   Knee flex WFL with pain WFL   Ankle PF WFL with pain WFL   Ankle DF WFL with pain WFL     Functional Mobilty    Bed Mobility:  Rolling Right: minimum assistance  Scooting: minimum assistance  Supine to Sit: minimum assistance  Transfers:  Sit to Stand:  minimum assistance with rolling walker      Gait    · Patient ambulated TDWB: left lower extremity 16  feet Minimal Assistance on level tile with Rolling Walker  .    · Pdemonstarting a  2-point gait and swing-to gait with decreased pauly, increased time in double stance, decreased velocity of limb motion, decreased swing-to-stance ratio and decreased weight-shifting ability.Impairments contributing to gait deviations include impaired balance, impaired coordination, pain and decreased strength  · Patient given  verbal and physical cues for WB precautions, RW sequencining/proximety and foot placement    Sitting Balance Static  Dynamic     Standby Assistance Contact Guard Assistance}   Standing Balance CGA with RW min A with RW       AM-PAC 6 CLICK MOBILITY  Total Score:16       Therapeutic Activities and Exercises:  ·  Whiteboard updated in patients room to current assistance level  · All of patients questions were answered within the scope of PT    Patient education  · Patient educated on the role of PT and POC  · Patient educated on importance  activity while in the hosptial per tolerance for improved endurance and to limit deconditioning   · Patient educated on safe transfers with nursing as appropriate  · Patient educated on energy conservation, pursed lip breathing  · Patient educated on proper transfer mechanics and safety    Patient left EOB  with all lines intact, call button in reach and RN present.    GOALS:   Multidisciplinary Problems     Physical Therapy Goals        Problem: Physical Therapy Goal    Goal Priority Disciplines Outcome Goal Variances Interventions   Physical Therapy Goal     PT, PT/OT Ongoing (interventions implemented as appropriate)     Description:  Goals to be met by: 10/2/18     Patient will increase functional independence with mobility by performin. Supine to sit with Stand-by Assistance  2. Sit to supine with Stand-by Assistance  3. Sit to stand transfer with Contact Guard Assistance  4. Bed to chair transfer with Contact Guard Assistance using Rolling Walker and maintaining TTWB precautions LLE.   5. Gait  x 25 feet with Minimal  Assistance using Rolling Walker and maintaining TTWB precautions LLE.   6. Lower extremity exercise program x20 reps per handout, with independence                      History:     Past Medical History:   Diagnosis Date    Abnormal finding on Pap smear, HPV DNA positive     Anemia associated with chronic renal failure     on Epogen    Blood type B+     Bulging discs - symptomatic      CAD (coronary artery disease)     Diabetes mellitus, type 2     ESRD (end stage renal disease) 2004    FSGS (focal segmental glomerulosclerosis)     with collapsing    Hyperlipidemia     Hypertension     Neuropathy     Obesity     Secondary hyperparathyroidism, renal     TIA (transient ischemic attack)     Uterine fibroid     small uterine        Past Surgical History:   Procedure Laterality Date    CARDIAC CATHETERIZATION      PCI x 2     SECTION, CLASSIC      DEBRIDEMENT-WOUND Left 2016    [facility]    DIALYSIS FISTULA CREATION      multiple fistulas and grafts before PD     INCISION AND DRAINAGE (I&D), LABIAL N/A 2016    [facility] by Harmony Fernandez [facility] at [facility] OR    INCISION AND DRAINAGE (I&D), LABIAL (ADD ON) Left 2016    [facility] at [facility]    INCISION AND DRAINAGE OF WOUND Left     VULVAR ABCESS WITH NECROSIS    OPEN REDUCTION AND INTERNAL FIXATION (ORIF) OF INJURY OF HIP Left 2018    Procedure: ORIF, HIP;  Surgeon: Tevin Grullon MD;  Location: [facility];  Service: Orthopedics;  Laterality: Left;    ORIF, HIP Left 2018    [facility] at [facility] OR    PERITONEAL CATHETER INSERTION      PERMCATH REWIRE- TUNNELED CATH REWIRE Left 2017    Performed by Baldev Munroe MD at [facility] CATH LAB    PERMCATH REWIRE- TUNNELED CATH REWIRE N/A 10/5/2017    [facility]formed by [facility]roe [facility] at [facility] CATH LAB    UMBILICAL HERNIA REPAIR         Clinical Decision Making:      History  Co-morbidities and personal factors that may impact the plan of care Examination  Body Structures and Functions, activity limitations and participation restrictions that may impact the plan of care Clinical Presentation   Decision Making/ Complexity Score   Co-morbidities:   [] Time since onset of injury / illness / exacerbation  [x] Status of current condition  []Patient's cognitive status and safety concerns    [x] Multiple Medical Problems (see med hx)  Personal Factors:   [] Patient's age  [] Prior Level of function   [] Patient's home situation (environment and family support)  [] Patient's level of motivation  [] Expected progression of patient      HISTORY:(criteria)    [] 19409 - no personal factors/history    [x] 62450 - has 1-2 personal factor/comorbidity     [] 83904 - has >3 personal factor/comorbidity     Body Regions:  [] Objective examination findings  [] Head     []  Neck  [] Trunk   [] Upper Extremity  [x] Lower Extremity    Body Systems:  [] For communication ability, affect, cognition, language, and learning style: the assessment of the ability to make needs known, consciousness, orientation (person, place, and time), expected emotional /behavioral responses, and learning preferences (eg, learning barriers, education  needs)  [x] For the neuromuscular system: a general assessment of gross coordinated movement (eg, balance, gait, locomotion, transfers, and transitions) and motor function  (motor control and motor learning)  [] For the musculoskeletal system: the assessment of gross symmetry, gross range of motion, gross strength, height, and weight  [] For the integumentary system: the assessment of pliability(texture), presence of scar formation, skin color, and skin integrity  [] For cardiovascular/pulmonary system: the assessment of heart rate, respiratory rate, blood pressure, and edema     Activity limitations:    [] Patient's cognitive status and saf ety concerns          []  Status of current condition      [] Weight bearing restriction  [] Cardiopulmunary Restriction    Participation Restrictions:   [] Goals and goal agreement with the patient     [] Rehab potential (prognosis) and probable outcome      Examination of Body System: (criteria)    [] 27947 - addressing 1-2 elements    [x] 69012 - addressing a total of 3 or more elements     [] 41095 -  Addressing a total of 4 or more elements         Clinical Presentation: (criteria)  Stable - 46321     On examination of body system using standardized tests and measures patient presents with 1-2 elements from any of the following: body structures and functions, activity limitations, and/or participation restrictions.  Leading to a clinical presentation that is considered stable and/or uncomplicated                              Clinical Decision Making  (Eval Complexity):  Low- 06229     Time Tracking:     PT Received On: 09/22/18  PT Start Time: 0927     PT Stop Time: 0940  PT Total Time (min): 13 min     Billable Minutes: Evaluation 13 min      Hugo Chavez, PT  09/22/2018

## 2018-09-22 NOTE — PLAN OF CARE
Problem: Physical Therapy Goal  Goal: Physical Therapy Goal  Goals to be met by: 10/2/18     Patient will increase functional independence with mobility by performin. Supine to sit with Stand-by Assistance  2. Sit to supine with Stand-by Assistance  3. Sit to stand transfer with Contact Guard Assistance  4. Bed to chair transfer with Contact Guard Assistance using Rolling Walker and maintaining TTWB precautions LLE.   5. Gait  x 25 feet with Minimal Assistance using Rolling Walker and maintaining TTWB precautions LLE.   6. Lower extremity exercise program x20 reps per handout, with independence    Outcome: Ongoing (interventions implemented as appropriate)  Patient evaluated today. All goals established are appropriate for patient progression at this time.   Hugo Chavez PT, DPT  2018  Pager: [phone]

## 2018-09-22 NOTE — PLAN OF CARE
Problem: Occupational Therapy Goal  Goal: Occupational Therapy Goal  Goals to be met by: 10/10/18     Patient will increase functional independence with ADLs by performing:    UE Dressing with Supervision.  LE Dressing with Moderate Assistance.  Grooming while seated with Supervision.  Toileting from bedside commode with Stand-by Assistance for hygiene and clothing management.   Stand pivot transfers with CGA and RW, while maintaining TTWB precautions LLE.    Outcome: Ongoing (interventions implemented as appropriate)  OT eval completed. The above goals are established to improve functional (I) and mobility.    SABRA Burrell  9/22/2018  Pager: [phone]

## 2018-09-22 NOTE — ASSESSMENT & PLAN NOTE
ESRD on CCPD  Dr. Shukri Barragan-[facility]  9 hours  2.5L fills volumes  2L last fill  4 cycles  No residual renal function  2.5% dextrose    Plan/Recommendations:  If remains in house, will provide CCPD tonight  Yesterday  cc  Will add 4.25% dextrose solution alternating with 2.5% dextrose to improve ultrafiltration    Anemia of ESRD  Complicated by acute GI bleed  Continue BUSHRA TIW    BMM  Continue with Renvela 1600 mg PO TID with meals when able

## 2018-09-22 NOTE — DISCHARGE SUMMARY
[facility]     Department of Hospital Medicine     [address] [place] [address]     [phone] after hours  [phone] fax                                        FACILITY TRANSFER ORDERS     Patient Name: Jenni Todd  YOB: 1970    9/24/18    Admit to: [facility]    Diagnoses:  Active Hospital Problems    Diagnosis  POA    *Acute GI bleeding [K92.2]  Yes    Chronic kidney disease-mineral and bone disorder [N18.9, E83.9, M89.9]  Yes    Encounter for CAPD (continuous ambulatory peritoneal dialysis) [Z99.2]  Not Applicable    Closed fracture of left hip with routine healing [S72.002D]  Not Applicable    Type 2 diabetes 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 [E11.22, N18.6, Z99.2]  Not Applicable    Coronary artery disease involving native coronary artery of native heart without angina pectoris [I25.10]  Yes    Hypertension associated with diabetes [E11.59, I10]  Yes    ESRD (end stage renal disease) on PD ( initiated dialysis 04/20/2004) [N18.6]  Yes     Nightly PD        Resolved Hospital Problems   No resolved problems to display.       Vital Signs: Routine.    Allergies:  Review of patient's allergies indicates:   Allergen Reactions    Clindamycin Diarrhea    Flagyl [metronidazole hcl]        Diet: renal diet     Acitivities: activity as tolerated    Nursing: fall precautions    Labs: per nephrology    CONSULTS:   Nephrology for PD     Physical Therapy to evaluate and treat     Occupational Therapy to evaluate and treat        MISCELLANEOUS CARE:                 Routine Skin for Bedridden Patients:  Apply moisture barrier cream to all    skin folds and wet areas in perineal area daily and after baths and                           all bowel movements.          DIABETES CARE:   SN to perform and educate Diabetic management with blood glucose monitoring:   .     Fingerstick blood sugar AC and HS      Fingerstick blood sugar every 6 hours if patient is unable to eat    Report CBG < 60 or > 350 to physician.                                          Insulin Sliding Scale          Glucose  Novolog Insulin Subcutaneous        0 - 60   Orange juice or glucose tablet, hold insulin      No insulin   201-250  2 units   251-300  4 units   301-350  6 units   351-400  8 units   >400   10 units then call physician    Medications:      Jenni Todd   Home Medication Instructions SHAWNA:[id number]    Printed on:09/22/18 4029   Medication Information                      amLODIPine (NORVASC) 5 MG tablet  Take 1 tablet (5 mg total) by mouth once daily.             aspirin (ECOTRIN) 81 MG EC tablet  Take 1 tablet (81 mg total) by mouth once daily.             atorvastatin (LIPITOR) 40 MG tablet  Take 40 mg by mouth every evening.              calcitRIOL (ROCALTROL) 0.5 MCG Cap  Take 1 capsule by mouth every morning.              carvedilol (COREG) 3.125 MG tablet  Take 1 tablet (3.125 mg total) by mouth 2 (two) times daily.             cinacalcet (SENSIPAR) 90 MG Tab  Take 1 tablet by mouth every evening.              epoetin adonay 10,000 unit/mL Soln 1 mL, epoetin adonay 20,000 unit/mL Soln 1 mL  Inject 30,000 Units into the vein every 14 (fourteen) days.             gabapentin (NEURONTIN) 100 MG capsule  1 capsule 3 (three) times daily.             heparin sodium,porcine (HEPARIN, PORCINE,) 5,000 unit/mL injection  Inject 1.6 mLs (8,000 Units total) into the skin every 12 (twelve) hours.             HUMULIN R REGULAR U-100 INSULN 100 unit/mL injection  Inject 200 units into dialysis bag every evening.             nateglinide (STARLIX) 120 MG tablet  STARLIX 120MG 30 MINUTES BEFORE EACH MEAL.             ONETOUCH VERIO Strp  USE 1 STRIP ONCE DAILY IN VITRO             PLAVIX 75 mg tablet  TAKE 1 BY MOUTH DAILY             sevelamer carbonate (RENVELA) 800 mg Tab  Take 3 to 4 tablets by mouth twice daily as needed              vitamin renal formula, B-complex-vitamin c-folic acid, (B COMPLEX-C-FOLIC ACID) 1 mg Cap  Take 1 capsule by mouth once daily. 1 Capsule Oral Every day                 levemir 15 units SQ daily.      _________________________________  Samara Minor MD  09/22/2018

## 2018-09-22 NOTE — PLAN OF CARE
Problem: Patient Care Overview  Goal: Plan of Care Review  Outcome: Ongoing (interventions implemented as appropriate)  Patient AAO X4, calm and cooperative. No acute events overnight. SD from ICU. Vitals stable, Peritoneal dialysis completed over night. Awaiting recent H/H results. No falls and injuries during shift. Pt stable, will continue to monitor.

## 2018-09-22 NOTE — HOSPITAL COURSE
CC- Upon transfer to MICU, patient received additional 2 unit of PRBC, 2 FFP, and 1 platelets. Hemoglobin at goal > 7 since last transfusion. No more episodes of bloody bowel movements. Colonoscopy 9/21 identified an ulcer in the rectum with visible vessel that was clipped. Likely the source of bleed. Patient hemodynamically stable for > 24 hrs.       PD- She has been on PD since 2004, under the care of Dr. Watt.  She is on nightly CCPD being management by [facility] on [address].  She reports that her CCPD prescription is for 9 hours with 4 cycles with 2.5L fill volumes and last fill of 2L.  She normally uses 2.5% dextrose, but appears to have been using 4.25% alternating with 2.5% while she was at the rehab center.  She received a partial treatment on Wednesday, only receiving one cycles, before she was transferred to [facility].     9/22 - pt does not have von willibrands or factor % leiden per hematology..  No further recatl bleeding.  Does not take pletal at home.  Hb 8.3 after transfusion.   Will resume plavix.   Can transfer pt back to Rehab for post femur fracture care.    9/23 - Hb 7.4, waiting on placement. Pt asymptomatic, doing well. On PD nightly
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
## 2018-09-22 NOTE — PLAN OF CARE
CM received update from Killian w/ [facility] - pt is accepted to transfer but d/t staffing issues facility will need to have pt transferred tmw. CM updated pt's nurse and Dr Minor w/ d/c plans.

## 2018-09-22 NOTE — ASSESSMENT & PLAN NOTE
--multiple episodes of BRBPR  --CRS performed bedside anoscopy in ED; no evidence of bleeding hemorrhoids  --CTA without evidence of active bleed  --GI aware of patient, will likely scope this am  --CBC Q4  --protonix BID  --transfusing for hgb < 7        9/22 - s/p transfusion, and clipping of vessel in rectal ulcer, doing well without bleeding.  Hb 8.3.   Can transfer back to rehab

## 2018-09-22 NOTE — NURSING
Patient transferred from MICU at 23:15pm, Upon arrival to unit, Patient oriented to room, vitals taken, pt assessed. Peritoneal dialysis started at bedtime by Dialysis RN.

## 2018-09-23 PROBLEM — D62 ACUTE BLOOD LOSS ANEMIA: Status: ACTIVE | Noted: 2018-01-01
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
## 2018-09-23 NOTE — ASSESSMENT & PLAN NOTE
Recent Labs      09/22/18   0736  09/22/18   1232  09/22/18   1739  09/22/18   2126  09/23/18   0730  09/23/18   1209   POCTGLUCOSE  232*  221*  210*  221*  285*  230*     Adding aspart 5 ac.  Will send to SNF w starlix upon discharge  On levemir 10 and SS
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
## 2018-09-23 NOTE — ASSESSMENT & PLAN NOTE
ESRD on CCPD  Dr. Shukri [facility]  9 hours  2.5L fills volumes  2L last fill  4 cycles  No residual renal function  2.5% dextrose    Plan/Recommendations:  Unknown if discharge today   If remains in house, will provide CCPD tonight  Yesterday UF 1,076 cc  Continue with 4.25% dextrose solution alternating with 2.5% dextrose to improve ultrafiltration    Anemia of ESRD  Complicated by acute GI bleed  Continue BUSHRA TIW    BMM  Continue with Renvela 1600 mg PO TID with meals when able

## 2018-09-23 NOTE — SUBJECTIVE & OBJECTIVE
Interval History:   Patient evaluated at bedside, had PD treatment yesterday which was well tolerated, with total UF of 1,076 ml.     Review of patient's allergies indicates:   Allergen Reactions    Clindamycin Diarrhea    Flagyl [metronidazole hcl]      Current Facility-Administered Medications   Medication Frequency    amLODIPine tablet 5 mg Daily    carvedilol tablet 3.125 mg BID    dextrose 50% injection 12.5 g PRN    dextrose 50% injection 25 g PRN    glucagon (human recombinant) injection 1 mg PRN    glucose chewable tablet 16 g PRN    glucose chewable tablet 24 g PRN    insulin aspart U-100 pen 0-5 Units QID (AC + HS) PRN    insulin aspart U-100 pen 5 Units TIDWM    insulin detemir U-100 pen 10 Units Daily    labetalol injection 10 mg Q4H PRN    ondansetron injection 4 mg Q8H PRN    pantoprazole injection 40 mg BID    potassium bicarbonate disintegrating tablet 25 mEq Once    sevelamer carbonate tablet 1,600 mg TID WM    sodium chloride 0.9% flush 3 mL PRN       Objective:     Vital Signs (Most Recent):  Temp: 98.2 °F (36.8 °C) (09/23/18 0744)  Pulse: 94 (09/23/18 0749)  Resp: 16 (09/23/18 0744)  BP: (!) 162/70 (09/23/18 0744)  SpO2: 95 % (09/23/18 0744)  O2 Device (Oxygen Therapy): room air (09/23/18 0744) Vital Signs (24h Range):  Temp:  [97.9 °F (36.6 °C)-98.8 °F (37.1 °C)] 98.2 °F (36.8 °C)  Pulse:  [71-99] 94  Resp:  [16-18] 16  SpO2:  [95 %-99 %] 95 %  BP: (137-183)/(60-82) 162/70     Weight: 102.1 kg (224 lb 16 oz) (09/21/18 0700)  Body mass index is 35.24 kg/m².  Body surface area is 2.2 meters squared.    I/O last 3 completed shifts:  In: 560 [P.O.:560]  Out: 553 [Other:553]    Physical Exam   Constitutional: She is oriented to person, place, and time. She appears well-developed. No distress. Nasal cannula in place.   HENT:   Head: Normocephalic and atraumatic.   Right Ear: External ear normal.   Left Ear: External ear normal.   Eyes: Conjunctivae and EOM are normal. Right eye  exhibits no discharge. Left eye exhibits no discharge. No scleral icterus.   Neck: Normal range of motion. Neck supple.   Cardiovascular: Normal rate and regular rhythm. Exam reveals no gallop and no friction rub.   No murmur heard.  Pulmonary/Chest: Effort normal and breath sounds normal. No respiratory distress. She has no wheezes. She has no rales.   Abdominal: Bowel sounds are normal. She exhibits distension. There is no guarding.   PD cath intact to RLQ   Musculoskeletal: She exhibits no edema.   Neurological: She is alert and oriented to person, place, and time.   Skin: Skin is warm and dry. She is not diaphoretic.   Psychiatric: She has a normal mood and affect. Her behavior is normal.       Significant Labs:  ABGs:   Recent Labs   Lab  09/20/18   0332   PH  7.445   PCO2  36.7   HCO3  25.3   POCSATURATED  99   BE  1     BMP:   Recent Labs   Lab  09/23/18   0532   GLU  310*   CL  90*   CO2  24   BUN  57*   CREATININE  10.6*   CALCIUM  8.3*   MG  1.6     CBC:   Recent Labs   Lab  09/23/18   0532   WBC  7.08   RBC  2.40*   HGB  7.3*   HCT  21.6*   PLT  394*   MCV  90   MCH  30.4   MCHC  33.8     CMP:   Recent Labs   Lab  09/23/18   0532   GLU  310*   CALCIUM  8.3*   ALBUMIN  2.5*   PROT  6.2   NA  130*   K  3.2*   CO2  24   CL  90*   BUN  57*   CREATININE  10.6*   ALKPHOS  132   ALT  <5*   AST  15   BILITOT  0.4     All labs within the past 24 hours have been reviewed.

## 2018-09-23 NOTE — ASSESSMENT & PLAN NOTE
--multiple episodes of BRBPR  --CRS performed bedside anoscopy in ED; no evidence of bleeding hemorrhoids  --CTA without evidence of active bleed  --GI aware of patient, will likely scope this am  --CBC Q4  --protonix BID  --transfusing for hgb < 7      9/23 - stable    9/22 - s/p transfusion, and clipping of vessel in rectal ulcer, doing well without bleeding.  Hb 8.3.   Can transfer back to rehab

## 2018-09-23 NOTE — PLAN OF CARE
· CM spoke w/ Penny from [facility] this AM - awaiting CBC results to review and requested clarification on orders that pt is cleared to restart DAPT as well as DVT ppx. YORDAN spoke w/ Dr Minor and she stated that pt should restart DAPT as well as DVT ppx. MD is also updating orders to reflect an insulin dose change - CM will forward orders after updated. YORDAN is awaiting callback for Penny for transfer time.     Update: YORDAN received callback from Penny w/ [facility] - she stated that rehab MD wants to review another h/h to make sure the levels have normalized and not drop again.

## 2018-09-23 NOTE — SUBJECTIVE & OBJECTIVE
Interval History: slept well    Review of Systems   Constitutional: Negative for chills and fatigue.   HENT: Negative for voice change.    Eyes: Negative for visual disturbance.   Respiratory: Negative for cough, choking and shortness of breath.    Cardiovascular: Negative for chest pain.   Gastrointestinal: Negative for abdominal distention, abdominal pain, blood in stool, constipation and diarrhea.   Genitourinary: Positive for difficulty urinating. Negative for flank pain and hematuria.   Musculoskeletal: Positive for gait problem. Negative for back pain, joint swelling and neck pain.        Left hip discomfort   Neurological: Negative for dizziness, facial asymmetry, numbness and headaches.   Psychiatric/Behavioral: Negative for agitation, behavioral problems and confusion.     Objective:     Vital Signs (Most Recent):  Temp: 98.6 °F (37 °C) (09/23/18 1123)  Pulse: 86 (09/23/18 1135)  Resp: 16 (09/23/18 1123)  BP: (!) 157/66 (09/23/18 1123)  SpO2: 100 % (09/23/18 1123) Vital Signs (24h Range):  Temp:  [97.9 °F (36.6 °C)-98.8 °F (37.1 °C)] 98.6 °F (37 °C)  Pulse:  [71-99] 86  Resp:  [16-18] 16  SpO2:  [95 %-100 %] 100 %  BP: (137-183)/(60-77) 157/66     Weight: 102.1 kg (224 lb 16 oz)  Body mass index is 35.24 kg/m².    Intake/Output Summary (Last 24 hours) at 9/23/2018 1317  Last data filed at 9/23/2018 0900  Gross per 24 hour   Intake 620 ml   Output 0 ml   Net 620 ml      Physical Exam   Constitutional: She is oriented to person, place, and time. She appears well-developed and well-nourished. No distress.   obese   HENT:   Head: Normocephalic and atraumatic.   Mouth/Throat: Oropharynx is clear and moist.   Eyes: EOM are normal. Pupils are equal, round, and reactive to light. No scleral icterus.   Neck: Normal range of motion. Neck supple.   Cardiovascular: Normal rate, regular rhythm and normal heart sounds.   Pulmonary/Chest: Effort normal and breath sounds normal. No stridor. She has no wheezes.   Abdominal:  Soft. Bowel sounds are normal. She exhibits no distension and no mass. There is no tenderness. There is no guarding.   Musculoskeletal: Normal range of motion. She exhibits no edema or deformity.   Lymphadenopathy:     She has no cervical adenopathy.   Neurological: She is alert and oriented to person, place, and time.   Skin: Skin is warm. She is not diaphoretic. No erythema.   Psychiatric: She has a normal mood and affect. Her behavior is normal. Thought content normal.       Significant Labs:   CBC:   Recent Labs   Lab  09/22/18   0808  09/22/18   1755  09/23/18   0532   WBC  9.63  9.77  7.08   HGB  8.3*  7.4*  7.3*   HCT  24.2*  21.5*  21.6*   PLT  389*  359*  394*     CMP:   Recent Labs   Lab  09/22/18   0809  09/23/18   0532   NA  130*  130*   K  3.9  3.2*   CL  92*  90*   CO2  22*  24   GLU  256*  310*   BUN  61*  57*   CREATININE  11.0*  10.6*   CALCIUM  9.0  8.3*   PROT  6.7  6.2   ALBUMIN  2.7*  2.5*   BILITOT  0.5  0.4   ALKPHOS  127  132   AST  18  15   ALT  <5*  <5*   ANIONGAP  16  16   EGFRNONAA  3.7*  3.8*

## 2018-09-23 NOTE — PLAN OF CARE
Problem: Pressure Ulcer Risk (Evan Scale) (Adult,Obstetrics,Pediatric)  Intervention: Turn/Reposition Often  Patient resting in bed. Peritoneal dialysis started by dialysis RN at 2100. Blood glucose checked prior to bedtime-patient instructed on appropriate dietary practices in order to manage blood sugar. Patient ordered to be on continuous pulse ox- war room states that they are out of continue pulse ox probes. No distress noted. Will continue to monitor.

## 2018-09-24 NOTE — PROGRESS NOTES
9-hr CCPD tx completed. Tolerated well. Pt disconnected from cycler using strict aseptic technique.

## 2018-09-24 NOTE — CONSULTS
Consult received.  Reviewed patient history and current admission.  IRF prior to admission.  Rehab team following.  Full consult to follow.      BERNICE Dominguez, FNP-C  Physical Medicine & Rehabilitation   09/24/2018  Spectralink: [id number]

## 2018-09-24 NOTE — PLAN OF CARE
Plans for patient to return to [facility] today pending bed assignment. CM to notify bedside nurse once appropriate. SW to set up transport once appropriate. SW and CM will continue to follow for any additional needs.     Repeat H/H results WNL and stable. CM sent lab results to [facility] via [facility].     09/24/18 1034   Final Note   Assessment Type Final Discharge Note   Discharge Disposition Rehab  ([facility])   Hospital Follow Up  Appt(s) scheduled? Yes   Discharge plans and expectations educations in teach back method with documentation complete? Yes

## 2018-09-24 NOTE — PROGRESS NOTES
CCPD initiated using strict aseptic technique. 4.25% solution set on last fill. 1759ml initial drain with clear effluent.

## 2018-09-24 NOTE — PLAN OF CARE
YORDAN requested rehab orders from Dr. Minor to send to [facility] today. Patient came from [facility].    YORDAN spoke with Laura Johnson NP. PM&R consult placed.    Pam Jack RN, CM [facility]  [phone] -[phone]

## 2018-09-24 NOTE — PLAN OF CARE
CM awaiting CBC results to send to [facility]. Patient accepted to transfer to [facility] today pending H/H results.    Pam Jack RN, CM  [facility]  [phone] -[phone]

## 2018-09-24 NOTE — PLAN OF CARE
Problem: Patient Care Overview  Goal: Plan of Care Review  Outcome: Ongoing (interventions implemented as appropriate)   09/24/18 3047   Coping/Psychosocial   Plan Of Care Reviewed With patient   Pt AAOx4. Denied pain but was depressed that she was not discharged today. PD at bedside overnight. Refused a bath and lien change. Stated that some of her medication doses were incorrect, will discuss in the am with the primary team. Accu check qhs coverage per sliding scale. Vitals stable. Safety maintained. Will continue to monitor.

## 2018-10-05 NOTE — TELEPHONE ENCOUNTER
----- Message from Rodrigue Russell MD sent at 10/1/2018 11:16 AM CDT -----  The polyps removed from the colon were benign (no cancerous changes), but considered a risk factor for cancer.  Follow-up as previously recommended with colonoscopy in 3 years.    MA to call patient with results.

## 2018-10-05 NOTE — TELEPHONE ENCOUNTER
Attempted to contact patient with test results, but she did not answer. Left voicemail for patient to return call.

## 2019-01-01 ENCOUNTER — HOSPITAL ENCOUNTER (INPATIENT)
Facility: HOSPITAL | Age: 49
LOS: 9 days | DRG: 492 | End: 2019-07-20
Attending: EMERGENCY MEDICINE | Admitting: EMERGENCY MEDICINE
Payer: MEDICARE

## 2019-01-01 ENCOUNTER — ANESTHESIA EVENT (OUTPATIENT)
Dept: SURGERY | Facility: HOSPITAL | Age: 49
DRG: 981 | End: 2019-01-01
Payer: MEDICARE

## 2019-01-01 ENCOUNTER — ANESTHESIA (OUTPATIENT)
Dept: SURGERY | Facility: HOSPITAL | Age: 49
DRG: 981 | End: 2019-01-01
Payer: MEDICARE

## 2019-01-01 ENCOUNTER — HOSPITAL ENCOUNTER (OUTPATIENT)
Facility: HOSPITAL | Age: 49
Discharge: NURSING, REHAB OR LONG-TERM CARE | End: 2019-04-07
Attending: EMERGENCY MEDICINE | Admitting: INTERNAL MEDICINE
Payer: MEDICARE

## 2019-01-01 ENCOUNTER — HOSPITAL ENCOUNTER (INPATIENT)
Facility: HOSPITAL | Age: 49
LOS: 28 days | Discharge: HOME | DRG: 280 | End: 2019-05-20
Attending: INTERNAL MEDICINE | Admitting: INTERNAL MEDICINE
Payer: MEDICARE

## 2019-01-01 ENCOUNTER — ANESTHESIA (OUTPATIENT)
Dept: ENDOSCOPY | Facility: HOSPITAL | Age: 49
DRG: 981 | End: 2019-01-01
Payer: MEDICARE

## 2019-01-01 ENCOUNTER — ANESTHESIA EVENT (OUTPATIENT)
Dept: ENDOSCOPY | Facility: HOSPITAL | Age: 49
DRG: 981 | End: 2019-01-01
Payer: MEDICARE

## 2019-01-01 ENCOUNTER — ANESTHESIA (OUTPATIENT)
Dept: SURGERY | Facility: HOSPITAL | Age: 49
DRG: 492 | End: 2019-01-01
Payer: MEDICARE

## 2019-01-01 ENCOUNTER — OFFICE VISIT (OUTPATIENT)
Dept: SURGERY | Facility: CLINIC | Age: 49
DRG: 280 | End: 2019-01-01
Attending: INTERNAL MEDICINE
Payer: MEDICARE

## 2019-01-01 ENCOUNTER — ANESTHESIA EVENT (OUTPATIENT)
Dept: SURGERY | Facility: HOSPITAL | Age: 49
DRG: 492 | End: 2019-01-01
Payer: MEDICARE

## 2019-01-01 ENCOUNTER — TELEPHONE (OUTPATIENT)
Dept: NEPHROLOGY | Facility: CLINIC | Age: 49
End: 2019-01-01

## 2019-01-01 ENCOUNTER — HOSPITAL ENCOUNTER (OUTPATIENT)
Facility: HOSPITAL | Age: 49
Discharge: HOME | End: 2019-06-09
Attending: EMERGENCY MEDICINE | Admitting: EMERGENCY MEDICINE
Payer: MEDICARE

## 2019-01-01 ENCOUNTER — INITIAL CONSULT (OUTPATIENT)
Dept: TRANSPLANT | Facility: CLINIC | Age: 49
End: 2019-01-01
Payer: MEDICARE

## 2019-01-01 ENCOUNTER — ANESTHESIA EVENT (OUTPATIENT)
Dept: CARDIOLOGY | Facility: HOSPITAL | Age: 49
DRG: 981 | End: 2019-01-01
Payer: MEDICARE

## 2019-01-01 ENCOUNTER — TELEPHONE (OUTPATIENT)
Dept: SURGERY | Facility: CLINIC | Age: 49
End: 2019-01-01

## 2019-01-01 ENCOUNTER — HOSPITAL ENCOUNTER (INPATIENT)
Facility: HOSPITAL | Age: 49
LOS: 10 days | Discharge: NURSING, REHAB OR LONG-TERM CARE | DRG: 853 | End: 2019-04-22
Attending: EMERGENCY MEDICINE | Admitting: INTERNAL MEDICINE
Payer: MEDICARE

## 2019-01-01 ENCOUNTER — DOCUMENTATION ONLY (OUTPATIENT)
Dept: BARIATRICS | Facility: CLINIC | Age: 49
End: 2019-01-01

## 2019-01-01 ENCOUNTER — HOSPITAL ENCOUNTER (OUTPATIENT)
Facility: OTHER | Age: 49
Discharge: HOME | End: 2019-05-24
Attending: INTERNAL MEDICINE | Admitting: INTERNAL MEDICINE
Payer: MEDICARE

## 2019-01-01 ENCOUNTER — HOSPITAL ENCOUNTER (INPATIENT)
Facility: HOSPITAL | Age: 49
LOS: 17 days | Discharge: NURSING, REHAB OR LONG-TERM CARE | DRG: 981 | End: 2019-04-04
Attending: INTERNAL MEDICINE | Admitting: INTERNAL MEDICINE
Payer: MEDICARE

## 2019-01-01 ENCOUNTER — HOSPITAL ENCOUNTER (INPATIENT)
Facility: HOSPITAL | Age: 49
LOS: 8 days | Discharge: ANOTHER HOSPITAL | DRG: 919 | End: 2019-04-12
Attending: HOSPITALIST | Admitting: INTERNAL MEDICINE
Payer: MEDICARE

## 2019-01-01 ENCOUNTER — HOSPITAL ENCOUNTER (EMERGENCY)
Facility: HOSPITAL | Age: 49
Discharge: HOME | End: 2019-06-16
Attending: EMERGENCY MEDICINE
Payer: MEDICARE

## 2019-01-01 ENCOUNTER — DOCUMENTATION ONLY (OUTPATIENT)
Dept: TRANSPLANT | Facility: CLINIC | Age: 49
End: 2019-01-01

## 2019-01-01 ENCOUNTER — HOSPITAL ENCOUNTER (INPATIENT)
Facility: HOSPITAL | Age: 49
LOS: 22 days | Discharge: ANOTHER HOSPITAL | DRG: 981 | End: 2019-03-18
Attending: EMERGENCY MEDICINE | Admitting: FAMILY MEDICINE
Payer: MEDICARE

## 2019-01-01 ENCOUNTER — HOSPITAL ENCOUNTER (OUTPATIENT)
Dept: PULMONOLOGY | Facility: CLINIC | Age: 49
Discharge: HOME | End: 2019-06-12
Payer: MEDICARE

## 2019-01-01 ENCOUNTER — TELEPHONE (OUTPATIENT)
Dept: CARDIOLOGY | Facility: CLINIC | Age: 49
End: 2019-01-01

## 2019-01-01 ENCOUNTER — HOSPITAL ENCOUNTER (OUTPATIENT)
Dept: RADIOLOGY | Facility: HOSPITAL | Age: 49
Discharge: HOME | End: 2019-06-03
Attending: SURGERY
Payer: MEDICARE

## 2019-01-01 ENCOUNTER — TELEPHONE (OUTPATIENT)
Dept: TRANSPLANT | Facility: CLINIC | Age: 49
End: 2019-01-01

## 2019-01-01 ENCOUNTER — ANESTHESIA (OUTPATIENT)
Dept: CARDIOLOGY | Facility: HOSPITAL | Age: 49
DRG: 981 | End: 2019-01-01
Payer: MEDICARE

## 2019-01-01 ENCOUNTER — HOSPITAL ENCOUNTER (OUTPATIENT)
Facility: HOSPITAL | Age: 49
Discharge: HOME | End: 2019-05-30
Attending: EMERGENCY MEDICINE | Admitting: INTERNAL MEDICINE
Payer: MEDICARE

## 2019-01-01 ENCOUNTER — HOSPITAL ENCOUNTER (OUTPATIENT)
Facility: HOSPITAL | Age: 49
Discharge: HOME | End: 2019-06-21
Attending: EMERGENCY MEDICINE | Admitting: HOSPITALIST
Payer: MEDICARE

## 2019-01-01 ENCOUNTER — OFFICE VISIT (OUTPATIENT)
Dept: SURGERY | Facility: CLINIC | Age: 49
End: 2019-01-01
Payer: MEDICARE

## 2019-01-01 VITALS
WEIGHT: 219.38 LBS | DIASTOLIC BLOOD PRESSURE: 83 MMHG | BODY MASS INDEX: 34.43 KG/M2 | SYSTOLIC BLOOD PRESSURE: 123 MMHG | OXYGEN SATURATION: 100 % | TEMPERATURE: 98 F | RESPIRATION RATE: 18 BRPM | HEART RATE: 90 BPM | HEIGHT: 67 IN

## 2019-01-01 VITALS
SYSTOLIC BLOOD PRESSURE: 123 MMHG | RESPIRATION RATE: 18 BRPM | OXYGEN SATURATION: 100 % | TEMPERATURE: 99 F | WEIGHT: 202.38 LBS | HEART RATE: 98 BPM | BODY MASS INDEX: 30.67 KG/M2 | HEIGHT: 68 IN | DIASTOLIC BLOOD PRESSURE: 77 MMHG

## 2019-01-01 VITALS
RESPIRATION RATE: 16 BRPM | WEIGHT: 211.63 LBS | SYSTOLIC BLOOD PRESSURE: 119 MMHG | HEIGHT: 67 IN | TEMPERATURE: 98 F | WEIGHT: 215 LBS | DIASTOLIC BLOOD PRESSURE: 76 MMHG | BODY MASS INDEX: 32.07 KG/M2 | HEIGHT: 68 IN | BODY MASS INDEX: 33.74 KG/M2 | OXYGEN SATURATION: 100 % | HEART RATE: 85 BPM

## 2019-01-01 VITALS
HEART RATE: 99 BPM | OXYGEN SATURATION: 100 % | WEIGHT: 205 LBS | HEIGHT: 67 IN | RESPIRATION RATE: 18 BRPM | SYSTOLIC BLOOD PRESSURE: 106 MMHG | DIASTOLIC BLOOD PRESSURE: 56 MMHG | BODY MASS INDEX: 32.18 KG/M2 | TEMPERATURE: 99 F

## 2019-01-01 VITALS
TEMPERATURE: 98 F | BODY MASS INDEX: 33.41 KG/M2 | SYSTOLIC BLOOD PRESSURE: 90 MMHG | DIASTOLIC BLOOD PRESSURE: 52 MMHG | HEIGHT: 68 IN | RESPIRATION RATE: 30 BRPM | HEART RATE: 129 BPM | OXYGEN SATURATION: 83 % | WEIGHT: 220.44 LBS

## 2019-01-01 VITALS
BODY MASS INDEX: 33.59 KG/M2 | SYSTOLIC BLOOD PRESSURE: 103 MMHG | HEIGHT: 67 IN | HEART RATE: 93 BPM | WEIGHT: 214 LBS | DIASTOLIC BLOOD PRESSURE: 64 MMHG

## 2019-01-01 VITALS
HEIGHT: 68 IN | BODY MASS INDEX: 32.01 KG/M2 | SYSTOLIC BLOOD PRESSURE: 118 MMHG | RESPIRATION RATE: 18 BRPM | HEART RATE: 84 BPM | TEMPERATURE: 98 F | OXYGEN SATURATION: 97 % | WEIGHT: 211.19 LBS | DIASTOLIC BLOOD PRESSURE: 78 MMHG

## 2019-01-01 VITALS
DIASTOLIC BLOOD PRESSURE: 57 MMHG | HEART RATE: 88 BPM | HEIGHT: 68 IN | SYSTOLIC BLOOD PRESSURE: 90 MMHG | WEIGHT: 216.5 LBS | BODY MASS INDEX: 32.81 KG/M2

## 2019-01-01 VITALS
BODY MASS INDEX: 31.98 KG/M2 | HEART RATE: 91 BPM | SYSTOLIC BLOOD PRESSURE: 89 MMHG | WEIGHT: 211 LBS | DIASTOLIC BLOOD PRESSURE: 59 MMHG | HEIGHT: 68 IN

## 2019-01-01 VITALS
BODY MASS INDEX: 31.87 KG/M2 | DIASTOLIC BLOOD PRESSURE: 52 MMHG | OXYGEN SATURATION: 100 % | HEART RATE: 108 BPM | WEIGHT: 203.06 LBS | TEMPERATURE: 99 F | RESPIRATION RATE: 22 BRPM | SYSTOLIC BLOOD PRESSURE: 82 MMHG | HEIGHT: 67 IN

## 2019-01-01 VITALS
WEIGHT: 214.31 LBS | OXYGEN SATURATION: 94 % | TEMPERATURE: 98 F | SYSTOLIC BLOOD PRESSURE: 127 MMHG | HEIGHT: 68 IN | BODY MASS INDEX: 32.48 KG/M2 | DIASTOLIC BLOOD PRESSURE: 83 MMHG | RESPIRATION RATE: 18 BRPM | HEART RATE: 85 BPM

## 2019-01-01 VITALS
OXYGEN SATURATION: 98 % | RESPIRATION RATE: 20 BRPM | HEART RATE: 95 BPM | BODY MASS INDEX: 32.08 KG/M2 | SYSTOLIC BLOOD PRESSURE: 101 MMHG | HEIGHT: 67 IN | TEMPERATURE: 99 F | WEIGHT: 204.38 LBS | DIASTOLIC BLOOD PRESSURE: 61 MMHG

## 2019-01-01 VITALS
OXYGEN SATURATION: 96 % | RESPIRATION RATE: 16 BRPM | DIASTOLIC BLOOD PRESSURE: 81 MMHG | HEIGHT: 67 IN | HEART RATE: 89 BPM | BODY MASS INDEX: 33.6 KG/M2 | TEMPERATURE: 97 F | WEIGHT: 214.06 LBS | SYSTOLIC BLOOD PRESSURE: 154 MMHG

## 2019-01-01 VITALS
OXYGEN SATURATION: 99 % | RESPIRATION RATE: 16 BRPM | HEIGHT: 67 IN | TEMPERATURE: 98 F | HEART RATE: 95 BPM | DIASTOLIC BLOOD PRESSURE: 58 MMHG | SYSTOLIC BLOOD PRESSURE: 104 MMHG | BODY MASS INDEX: 33.43 KG/M2 | WEIGHT: 213 LBS

## 2019-01-01 VITALS
SYSTOLIC BLOOD PRESSURE: 128 MMHG | BODY MASS INDEX: 33.46 KG/M2 | OXYGEN SATURATION: 100 % | HEIGHT: 67 IN | RESPIRATION RATE: 16 BRPM | WEIGHT: 213.19 LBS | HEART RATE: 95 BPM | DIASTOLIC BLOOD PRESSURE: 62 MMHG | TEMPERATURE: 98 F

## 2019-01-01 DIAGNOSIS — I21.4 NSTEMI (NON-ST ELEVATED MYOCARDIAL INFARCTION): ICD-10-CM

## 2019-01-01 DIAGNOSIS — N05.1 FSGS (FOCAL SEGMENTAL GLOMERULOSCLEROSIS): ICD-10-CM

## 2019-01-01 DIAGNOSIS — R19.5 OCCULT GI BLEEDING: ICD-10-CM

## 2019-01-01 DIAGNOSIS — I73.9 PAD (PERIPHERAL ARTERY DISEASE): ICD-10-CM

## 2019-01-01 DIAGNOSIS — Z99.2 ESRD (END STAGE RENAL DISEASE) ON DIALYSIS: Primary | ICD-10-CM

## 2019-01-01 DIAGNOSIS — N18.6 ESRD (END STAGE RENAL DISEASE): ICD-10-CM

## 2019-01-01 DIAGNOSIS — Z99.2 TYPE 2 DIABETES MELLITUS WITH CHRONIC KIDNEY DISEASE ON CHRONIC DIALYSIS, WITH LONG-TERM CURRENT USE OF INSULIN: ICD-10-CM

## 2019-01-01 DIAGNOSIS — E11.59 HYPERTENSION ASSOCIATED WITH DIABETES: ICD-10-CM

## 2019-01-01 DIAGNOSIS — I82.409 DVT (DEEP VENOUS THROMBOSIS): ICD-10-CM

## 2019-01-01 DIAGNOSIS — R18.8 OTHER ASCITES: Primary | ICD-10-CM

## 2019-01-01 DIAGNOSIS — E87.8 ELECTROLYTE ABNORMALITY: ICD-10-CM

## 2019-01-01 DIAGNOSIS — T07.XXXA MULTIPLE WOUNDS: ICD-10-CM

## 2019-01-01 DIAGNOSIS — I10 ESSENTIAL HYPERTENSION: ICD-10-CM

## 2019-01-01 DIAGNOSIS — R11.2 NAUSEA AND VOMITING, INTRACTABILITY OF VOMITING NOT SPECIFIED, UNSPECIFIED VOMITING TYPE: ICD-10-CM

## 2019-01-01 DIAGNOSIS — N18.6 TYPE 2 DIABETES MELLITUS WITH CHRONIC KIDNEY DISEASE ON CHRONIC DIALYSIS, WITH LONG-TERM CURRENT USE OF INSULIN: ICD-10-CM

## 2019-01-01 DIAGNOSIS — I12.0 TYPE 2 DIABETES MELLITUS WITH STAGE 5 CHRONIC KIDNEY DISEASE AND HYPERTENSION: ICD-10-CM

## 2019-01-01 DIAGNOSIS — J96.01 ACUTE HYPOXEMIC RESPIRATORY FAILURE: ICD-10-CM

## 2019-01-01 DIAGNOSIS — E11.22 TYPE 2 DIABETES MELLITUS WITH STAGE 5 CHRONIC KIDNEY DISEASE AND HYPERTENSION: ICD-10-CM

## 2019-01-01 DIAGNOSIS — N18.6 ESRD (END STAGE RENAL DISEASE) ON DIALYSIS: ICD-10-CM

## 2019-01-01 DIAGNOSIS — N18.6 ESRD (END STAGE RENAL DISEASE) ON DIALYSIS: Primary | ICD-10-CM

## 2019-01-01 DIAGNOSIS — I15.2 HYPERTENSION ASSOCIATED WITH DIABETES: ICD-10-CM

## 2019-01-01 DIAGNOSIS — I50.42 CHRONIC COMBINED SYSTOLIC AND DIASTOLIC HEART FAILURE: ICD-10-CM

## 2019-01-01 DIAGNOSIS — Z79.4 TYPE 2 DIABETES MELLITUS WITH CHRONIC KIDNEY DISEASE ON CHRONIC DIALYSIS, WITH LONG-TERM CURRENT USE OF INSULIN: ICD-10-CM

## 2019-01-01 DIAGNOSIS — R07.9 CHEST PAIN: ICD-10-CM

## 2019-01-01 DIAGNOSIS — Z99.2 ESRD (END STAGE RENAL DISEASE) ON DIALYSIS: ICD-10-CM

## 2019-01-01 DIAGNOSIS — D63.1 ANEMIA IN CHRONIC KIDNEY DISEASE, ON CHRONIC DIALYSIS: ICD-10-CM

## 2019-01-01 DIAGNOSIS — I25.10 CORONARY ARTERY DISEASE INVOLVING NATIVE CORONARY ARTERY OF NATIVE HEART WITHOUT ANGINA PECTORIS: ICD-10-CM

## 2019-01-01 DIAGNOSIS — M51.36 DDD (DEGENERATIVE DISC DISEASE), LUMBAR: Primary | ICD-10-CM

## 2019-01-01 DIAGNOSIS — R55 SYNCOPE: ICD-10-CM

## 2019-01-01 DIAGNOSIS — I24.9 ACS (ACUTE CORONARY SYNDROME): ICD-10-CM

## 2019-01-01 DIAGNOSIS — I25.10 CORONARY ARTERY DISEASE INVOLVING NATIVE CORONARY ARTERY, ANGINA PRESENCE UNSPECIFIED, UNSPECIFIED WHETHER NATIVE OR TRANSPLANTED HEART: ICD-10-CM

## 2019-01-01 DIAGNOSIS — R41.82 ALTERED MENTAL STATE: ICD-10-CM

## 2019-01-01 DIAGNOSIS — T82.898A PROBLEM WITH DIALYSIS ACCESS: ICD-10-CM

## 2019-01-01 DIAGNOSIS — N18.6 ESRD (END STAGE RENAL DISEASE): Primary | ICD-10-CM

## 2019-01-01 DIAGNOSIS — T85.71XA PERITONITIS DUE TO INFECTED PERITONEAL DIALYSIS CATHETER, INITIAL ENCOUNTER: ICD-10-CM

## 2019-01-01 DIAGNOSIS — I50.9 CONGESTIVE HEART FAILURE, UNSPECIFIED HF CHRONICITY, UNSPECIFIED HEART FAILURE TYPE: Primary | ICD-10-CM

## 2019-01-01 DIAGNOSIS — T85.71XD PERITONITIS ASSOCIATED WITH PERITONEAL DIALYSIS, SUBSEQUENT ENCOUNTER: ICD-10-CM

## 2019-01-01 DIAGNOSIS — T81.89XD DELAYED SURGICAL WOUND HEALING, SUBSEQUENT ENCOUNTER: ICD-10-CM

## 2019-01-01 DIAGNOSIS — R10.9 ABDOMINAL PAIN, UNSPECIFIED ABDOMINAL LOCATION: ICD-10-CM

## 2019-01-01 DIAGNOSIS — I50.21 ACUTE SYSTOLIC HEART FAILURE: ICD-10-CM

## 2019-01-01 DIAGNOSIS — Z49.01 FITTING AND ADJUSTMENT OF EXTRACORPOREAL DIALYSIS CATHETER: Primary | ICD-10-CM

## 2019-01-01 DIAGNOSIS — R00.0 SINUS TACHYCARDIA: ICD-10-CM

## 2019-01-01 DIAGNOSIS — I25.10 CORONARY ARTERY DISEASE, ANGINA PRESENCE UNSPECIFIED, UNSPECIFIED VESSEL OR LESION TYPE, UNSPECIFIED WHETHER NATIVE OR TRANSPLANTED HEART: Primary | ICD-10-CM

## 2019-01-01 DIAGNOSIS — Z79.4 TYPE 2 DIABETES MELLITUS WITH HYPERGLYCEMIA, WITH LONG-TERM CURRENT USE OF INSULIN: ICD-10-CM

## 2019-01-01 DIAGNOSIS — T82.898D PROBLEM WITH DIALYSIS ACCESS, SUBSEQUENT ENCOUNTER: Primary | ICD-10-CM

## 2019-01-01 DIAGNOSIS — I50.9 CHF (CONGESTIVE HEART FAILURE): ICD-10-CM

## 2019-01-01 DIAGNOSIS — Z99.2 DIALYSIS PATIENT: ICD-10-CM

## 2019-01-01 DIAGNOSIS — N18.6 ANEMIA IN CHRONIC KIDNEY DISEASE, ON CHRONIC DIALYSIS: ICD-10-CM

## 2019-01-01 DIAGNOSIS — R50.9 FEVER, UNSPECIFIED FEVER CAUSE: ICD-10-CM

## 2019-01-01 DIAGNOSIS — E11.22 TYPE 2 DIABETES MELLITUS WITH CHRONIC KIDNEY DISEASE ON CHRONIC DIALYSIS, WITH LONG-TERM CURRENT USE OF INSULIN: ICD-10-CM

## 2019-01-01 DIAGNOSIS — I50.23 ACUTE ON CHRONIC SYSTOLIC HEART FAILURE: ICD-10-CM

## 2019-01-01 DIAGNOSIS — K52.9 GASTROENTERITIS: Primary | ICD-10-CM

## 2019-01-01 DIAGNOSIS — I95.9 HYPOTENSION: ICD-10-CM

## 2019-01-01 DIAGNOSIS — R18.8 OTHER ASCITES: ICD-10-CM

## 2019-01-01 DIAGNOSIS — R65.21 SEPTIC SHOCK: Primary | ICD-10-CM

## 2019-01-01 DIAGNOSIS — Z01.810 PREOP CARDIOVASCULAR EXAM: ICD-10-CM

## 2019-01-01 DIAGNOSIS — R11.2 NAUSEA AND VOMITING: ICD-10-CM

## 2019-01-01 DIAGNOSIS — T82.9XXA COMPLICATION OF VASCULAR ACCESS FOR DIALYSIS, INITIAL ENCOUNTER: ICD-10-CM

## 2019-01-01 DIAGNOSIS — E11.00 TYPE 2 DIABETES MELLITUS WITH HYPEROSMOLARITY WITHOUT COMA, WITH LONG-TERM CURRENT USE OF INSULIN: ICD-10-CM

## 2019-01-01 DIAGNOSIS — T82.41XD HEMODIALYSIS CATHETER DYSFUNCTION, SUBSEQUENT ENCOUNTER: ICD-10-CM

## 2019-01-01 DIAGNOSIS — T80.219A CENTRAL VENOUS LINE INFECTION, INITIAL ENCOUNTER: ICD-10-CM

## 2019-01-01 DIAGNOSIS — Z99.2 ANEMIA IN CHRONIC KIDNEY DISEASE, ON CHRONIC DIALYSIS: ICD-10-CM

## 2019-01-01 DIAGNOSIS — E87.1 HYPONATREMIA: ICD-10-CM

## 2019-01-01 DIAGNOSIS — I25.10 3-VESSEL CORONARY ARTERY DISEASE: ICD-10-CM

## 2019-01-01 DIAGNOSIS — R10.84 GENERALIZED ABDOMINAL PAIN: ICD-10-CM

## 2019-01-01 DIAGNOSIS — A41.9 SEPTIC SHOCK: ICD-10-CM

## 2019-01-01 DIAGNOSIS — M25.579 ANKLE PAIN: ICD-10-CM

## 2019-01-01 DIAGNOSIS — R79.89 TROPONIN LEVEL ELEVATED: ICD-10-CM

## 2019-01-01 DIAGNOSIS — T82.9XXA COMPLICATION OF VASCULAR ACCESS FOR DIALYSIS: ICD-10-CM

## 2019-01-01 DIAGNOSIS — S82.141A CLOSED FRACTURE OF RIGHT TIBIAL PLATEAU, INITIAL ENCOUNTER: ICD-10-CM

## 2019-01-01 DIAGNOSIS — S82.101A CLOSED FRACTURE OF PROXIMAL END OF RIGHT TIBIA, UNSPECIFIED FRACTURE MORPHOLOGY, INITIAL ENCOUNTER: Primary | ICD-10-CM

## 2019-01-01 DIAGNOSIS — S82.101A CLOSED FRACTURE OF RIGHT PROXIMAL TIBIA: ICD-10-CM

## 2019-01-01 DIAGNOSIS — K65.9 PERITONITIS DUE TO INFECTED PERITONEAL DIALYSIS CATHETER, INITIAL ENCOUNTER: ICD-10-CM

## 2019-01-01 DIAGNOSIS — R79.89 ELEVATED TROPONIN: ICD-10-CM

## 2019-01-01 DIAGNOSIS — K92.0 GASTROINTESTINAL HEMORRHAGE WITH HEMATEMESIS: ICD-10-CM

## 2019-01-01 DIAGNOSIS — R57.0 CARDIOGENIC SHOCK: ICD-10-CM

## 2019-01-01 DIAGNOSIS — I99.9 VASCULOPATHY: ICD-10-CM

## 2019-01-01 DIAGNOSIS — I50.9 CONGESTIVE HEART FAILURE, UNSPECIFIED HF CHRONICITY, UNSPECIFIED HEART FAILURE TYPE: ICD-10-CM

## 2019-01-01 DIAGNOSIS — E88.09 HYPOALBUMINEMIA: ICD-10-CM

## 2019-01-01 DIAGNOSIS — N18.5 TYPE 2 DIABETES MELLITUS WITH STAGE 5 CHRONIC KIDNEY DISEASE AND HYPERTENSION: ICD-10-CM

## 2019-01-01 DIAGNOSIS — I82.413 ACUTE DEEP VEIN THROMBOSIS (DVT) OF FEMORAL VEIN OF BOTH LOWER EXTREMITIES: ICD-10-CM

## 2019-01-01 DIAGNOSIS — E78.41 ELEVATED LIPOPROTEIN(A): ICD-10-CM

## 2019-01-01 DIAGNOSIS — E11.42 DIABETIC POLYNEUROPATHY ASSOCIATED WITH TYPE 2 DIABETES MELLITUS: ICD-10-CM

## 2019-01-01 DIAGNOSIS — T82.9XXA COMPLICATION OF VASCULAR ACCESS FOR DIALYSIS, INITIAL ENCOUNTER: Primary | ICD-10-CM

## 2019-01-01 DIAGNOSIS — T82.49XA OTHER COMPLICATION OF VASCULAR DIALYSIS CATHETER, INITIAL ENCOUNTER: ICD-10-CM

## 2019-01-01 DIAGNOSIS — S82.141A CLOSED FRACTURE OF RIGHT TIBIAL PLATEAU: ICD-10-CM

## 2019-01-01 DIAGNOSIS — T85.71XS PERITONITIS ASSOCIATED WITH PERITONEAL DIALYSIS, SEQUELA: Primary | ICD-10-CM

## 2019-01-01 DIAGNOSIS — T85.71XA PERITONITIS ASSOCIATED WITH PERITONEAL DIALYSIS, INITIAL ENCOUNTER: ICD-10-CM

## 2019-01-01 DIAGNOSIS — T82.41XA HEMODIALYSIS CATHETER DYSFUNCTION, INITIAL ENCOUNTER: ICD-10-CM

## 2019-01-01 DIAGNOSIS — T85.71XA PERITONITIS ASSOCIATED WITH PERITONEAL DIALYSIS: ICD-10-CM

## 2019-01-01 DIAGNOSIS — Z78.9 PROBLEM WITH VASCULAR ACCESS: Primary | ICD-10-CM

## 2019-01-01 DIAGNOSIS — Z74.09 IMPAIRED MOBILITY AND ADLS: ICD-10-CM

## 2019-01-01 DIAGNOSIS — R94.31 ST ELEVATION: ICD-10-CM

## 2019-01-01 DIAGNOSIS — M25.569 KNEE PAIN, ACUTE: ICD-10-CM

## 2019-01-01 DIAGNOSIS — T82.9XXA COMPLICATION OF VASCULAR DIALYSIS CATHETER: Primary | ICD-10-CM

## 2019-01-01 DIAGNOSIS — Z79.4 TYPE 2 DIABETES MELLITUS WITH HYPEROSMOLARITY WITHOUT COMA, WITH LONG-TERM CURRENT USE OF INSULIN: ICD-10-CM

## 2019-01-01 DIAGNOSIS — Z78.9 IMPAIRED MOBILITY AND ADLS: ICD-10-CM

## 2019-01-01 DIAGNOSIS — T82.41XA HEMODIALYSIS CATHETER DYSFUNCTION: ICD-10-CM

## 2019-01-01 DIAGNOSIS — R65.21 SEPTIC SHOCK: ICD-10-CM

## 2019-01-01 DIAGNOSIS — I50.43 ACUTE ON CHRONIC COMBINED SYSTOLIC AND DIASTOLIC HEART FAILURE: ICD-10-CM

## 2019-01-01 DIAGNOSIS — R94.31 ABNORMAL EKG: ICD-10-CM

## 2019-01-01 DIAGNOSIS — S82.141A CLOSED BICONDYLAR FRACTURE OF RIGHT TIBIAL PLATEAU: ICD-10-CM

## 2019-01-01 DIAGNOSIS — T82.41XA MECHANICAL BREAKDOWN OF VASCULAR DIALYSIS CATHETER, INITIAL ENCOUNTER: ICD-10-CM

## 2019-01-01 DIAGNOSIS — I25.9 CHEST PAIN DUE TO MYOCARDIAL ISCHEMIA: ICD-10-CM

## 2019-01-01 DIAGNOSIS — I25.10 CORONARY ARTERY DISEASE INVOLVING NATIVE HEART WITHOUT ANGINA PECTORIS, UNSPECIFIED VESSEL OR LESION TYPE: ICD-10-CM

## 2019-01-01 DIAGNOSIS — A41.9 SEPTIC SHOCK: Primary | ICD-10-CM

## 2019-01-01 DIAGNOSIS — M79.606 LEG PAIN: ICD-10-CM

## 2019-01-01 DIAGNOSIS — T82.898D PROBLEM WITH DIALYSIS ACCESS, SUBSEQUENT ENCOUNTER: ICD-10-CM

## 2019-01-01 DIAGNOSIS — T82.9XXA COMPLICATION OF VASCULAR DIALYSIS CATHETER, UNSPECIFIED COMPLICATION, INITIAL ENCOUNTER: ICD-10-CM

## 2019-01-01 DIAGNOSIS — E11.65 TYPE 2 DIABETES MELLITUS WITH HYPERGLYCEMIA, WITH LONG-TERM CURRENT USE OF INSULIN: ICD-10-CM

## 2019-01-01 DIAGNOSIS — R57.9 SHOCK: ICD-10-CM

## 2019-01-01 DIAGNOSIS — E86.1 HYPOTENSION DUE TO HYPOVOLEMIA: ICD-10-CM

## 2019-01-01 LAB
ABO + RH BLD: NORMAL
ACID FAST MOD KINY STN SPEC: NORMAL
ALBUMIN SERPL BCP-MCNC: 1.4 G/DL (ref 3.5–5.2)
ALBUMIN SERPL BCP-MCNC: 1.4 G/DL (ref 3.5–5.2)
ALBUMIN SERPL BCP-MCNC: 1.5 G/DL
ALBUMIN SERPL BCP-MCNC: 1.5 G/DL (ref 3.5–5.2)
ALBUMIN SERPL BCP-MCNC: 1.6 G/DL
ALBUMIN SERPL BCP-MCNC: 1.6 G/DL (ref 3.5–5.2)
ALBUMIN SERPL BCP-MCNC: 1.7 G/DL
ALBUMIN SERPL BCP-MCNC: 1.7 G/DL (ref 3.5–5.2)
ALBUMIN SERPL BCP-MCNC: 1.8 G/DL
ALBUMIN SERPL BCP-MCNC: 1.8 G/DL (ref 3.5–5.2)
ALBUMIN SERPL BCP-MCNC: 1.9 G/DL
ALBUMIN SERPL BCP-MCNC: 1.9 G/DL (ref 3.5–5.2)
ALBUMIN SERPL BCP-MCNC: 2 G/DL
ALBUMIN SERPL BCP-MCNC: 2 G/DL (ref 3.5–5.2)
ALBUMIN SERPL BCP-MCNC: 2.1 G/DL
ALBUMIN SERPL BCP-MCNC: 2.1 G/DL
ALBUMIN SERPL BCP-MCNC: 2.1 G/DL (ref 3.5–5.2)
ALBUMIN SERPL BCP-MCNC: 2.2 G/DL (ref 3.5–5.2)
ALBUMIN SERPL BCP-MCNC: 2.3 G/DL
ALBUMIN SERPL BCP-MCNC: 2.3 G/DL (ref 3.5–5.2)
ALBUMIN SERPL BCP-MCNC: 2.4 G/DL (ref 3.5–5.2)
ALBUMIN SERPL BCP-MCNC: 2.5 G/DL (ref 3.5–5.2)
ALBUMIN SERPL BCP-MCNC: 2.5 G/DL (ref 3.5–5.2)
ALBUMIN SERPL BCP-MCNC: 2.6 G/DL (ref 3.5–5.2)
ALBUMIN SERPL BCP-MCNC: 2.6 G/DL (ref 3.5–5.2)
ALBUMIN SERPL BCP-MCNC: 2.7 G/DL
ALBUMIN SERPL BCP-MCNC: 2.7 G/DL (ref 3.5–5.2)
ALBUMIN SERPL BCP-MCNC: 2.8 G/DL (ref 3.5–5.2)
ALBUMIN SERPL BCP-MCNC: 3 G/DL (ref 3.5–5.2)
ALBUMIN SERPL BCP-MCNC: 3 G/DL (ref 3.5–5.2)
ALBUMIN SERPL BCP-MCNC: 3.1 G/DL (ref 3.5–5.2)
ALBUMIN SERPL BCP-MCNC: 3.3 G/DL (ref 3.5–5.2)
ALLENS TEST: ABNORMAL
ALP SERPL-CCNC: 100 U/L
ALP SERPL-CCNC: 100 U/L
ALP SERPL-CCNC: 101 U/L
ALP SERPL-CCNC: 102 U/L (ref 55–135)
ALP SERPL-CCNC: 106 U/L
ALP SERPL-CCNC: 106 U/L (ref 55–135)
ALP SERPL-CCNC: 107 U/L
ALP SERPL-CCNC: 108 U/L
ALP SERPL-CCNC: 108 U/L (ref 55–135)
ALP SERPL-CCNC: 110 U/L (ref 55–135)
ALP SERPL-CCNC: 112 U/L
ALP SERPL-CCNC: 112 U/L (ref 55–135)
ALP SERPL-CCNC: 114 U/L
ALP SERPL-CCNC: 114 U/L (ref 55–135)
ALP SERPL-CCNC: 115 U/L
ALP SERPL-CCNC: 115 U/L (ref 55–135)
ALP SERPL-CCNC: 117 U/L
ALP SERPL-CCNC: 118 U/L (ref 55–135)
ALP SERPL-CCNC: 120 U/L
ALP SERPL-CCNC: 121 U/L (ref 55–135)
ALP SERPL-CCNC: 122 U/L (ref 55–135)
ALP SERPL-CCNC: 123 U/L (ref 55–135)
ALP SERPL-CCNC: 124 U/L
ALP SERPL-CCNC: 124 U/L (ref 55–135)
ALP SERPL-CCNC: 125 U/L
ALP SERPL-CCNC: 127 U/L (ref 55–135)
ALP SERPL-CCNC: 128 U/L
ALP SERPL-CCNC: 128 U/L
ALP SERPL-CCNC: 129 U/L
ALP SERPL-CCNC: 129 U/L (ref 55–135)
ALP SERPL-CCNC: 130 U/L
ALP SERPL-CCNC: 131 U/L
ALP SERPL-CCNC: 132 U/L
ALP SERPL-CCNC: 133 U/L
ALP SERPL-CCNC: 134 U/L (ref 55–135)
ALP SERPL-CCNC: 139 U/L (ref 55–135)
ALP SERPL-CCNC: 140 U/L
ALP SERPL-CCNC: 142 U/L
ALP SERPL-CCNC: 143 U/L
ALP SERPL-CCNC: 143 U/L (ref 55–135)
ALP SERPL-CCNC: 148 U/L
ALP SERPL-CCNC: 78 U/L
ALP SERPL-CCNC: 80 U/L
ALP SERPL-CCNC: 85 U/L (ref 55–135)
ALP SERPL-CCNC: 86 U/L
ALP SERPL-CCNC: 88 U/L
ALP SERPL-CCNC: 89 U/L (ref 55–135)
ALP SERPL-CCNC: 89 U/L (ref 55–135)
ALP SERPL-CCNC: 90 U/L (ref 55–135)
ALP SERPL-CCNC: 92 U/L
ALP SERPL-CCNC: 93 U/L
ALP SERPL-CCNC: 93 U/L (ref 55–135)
ALP SERPL-CCNC: 95 U/L (ref 55–135)
ALP SERPL-CCNC: 96 U/L
ALP SERPL-CCNC: 96 U/L (ref 55–135)
ALP SERPL-CCNC: 97 U/L (ref 55–135)
ALP SERPL-CCNC: 98 U/L
ALP SERPL-CCNC: 99 U/L
ALP SERPL-CCNC: 99 U/L
ALP SERPL-CCNC: 99 U/L (ref 55–135)
ALT SERPL W/O P-5'-P-CCNC: 12 U/L
ALT SERPL W/O P-5'-P-CCNC: 12 U/L
ALT SERPL W/O P-5'-P-CCNC: 12 U/L (ref 10–44)
ALT SERPL W/O P-5'-P-CCNC: 13 U/L
ALT SERPL W/O P-5'-P-CCNC: 13 U/L (ref 10–44)
ALT SERPL W/O P-5'-P-CCNC: 15 U/L (ref 10–44)
ALT SERPL W/O P-5'-P-CCNC: 153 U/L (ref 10–44)
ALT SERPL W/O P-5'-P-CCNC: 20 U/L
ALT SERPL W/O P-5'-P-CCNC: 20 U/L (ref 10–44)
ALT SERPL W/O P-5'-P-CCNC: 22 U/L
ALT SERPL W/O P-5'-P-CCNC: 22 U/L (ref 10–44)
ALT SERPL W/O P-5'-P-CCNC: 23 U/L
ALT SERPL W/O P-5'-P-CCNC: 237 U/L (ref 10–44)
ALT SERPL W/O P-5'-P-CCNC: 24 U/L
ALT SERPL W/O P-5'-P-CCNC: 274 U/L (ref 10–44)
ALT SERPL W/O P-5'-P-CCNC: 30 U/L (ref 10–44)
ALT SERPL W/O P-5'-P-CCNC: 301 U/L (ref 10–44)
ALT SERPL W/O P-5'-P-CCNC: 301 U/L (ref 10–44)
ALT SERPL W/O P-5'-P-CCNC: 348 U/L (ref 10–44)
ALT SERPL W/O P-5'-P-CCNC: 5 U/L
ALT SERPL W/O P-5'-P-CCNC: 52 U/L (ref 10–44)
ALT SERPL W/O P-5'-P-CCNC: 6 U/L
ALT SERPL W/O P-5'-P-CCNC: 6 U/L (ref 10–44)
ALT SERPL W/O P-5'-P-CCNC: 69 U/L (ref 10–44)
ALT SERPL W/O P-5'-P-CCNC: 7 U/L (ref 10–44)
ALT SERPL W/O P-5'-P-CCNC: 7 U/L (ref 10–44)
ALT SERPL W/O P-5'-P-CCNC: 8 U/L
ALT SERPL W/O P-5'-P-CCNC: 8 U/L (ref 10–44)
ALT SERPL W/O P-5'-P-CCNC: 9 U/L (ref 10–44)
ALT SERPL W/O P-5'-P-CCNC: 9 U/L (ref 10–44)
ALT SERPL W/O P-5'-P-CCNC: 92 U/L (ref 10–44)
ALT SERPL W/O P-5'-P-CCNC: <5 U/L
ALT SERPL W/O P-5'-P-CCNC: <5 U/L (ref 10–44)
ANION GAP SERPL CALC-SCNC: 10 MMOL/L (ref 8–16)
ANION GAP SERPL CALC-SCNC: 11 MMOL/L
ANION GAP SERPL CALC-SCNC: 11 MMOL/L
ANION GAP SERPL CALC-SCNC: 11 MMOL/L (ref 8–16)
ANION GAP SERPL CALC-SCNC: 12 MMOL/L
ANION GAP SERPL CALC-SCNC: 12 MMOL/L (ref 8–16)
ANION GAP SERPL CALC-SCNC: 13 MMOL/L
ANION GAP SERPL CALC-SCNC: 13 MMOL/L (ref 8–16)
ANION GAP SERPL CALC-SCNC: 14 MMOL/L
ANION GAP SERPL CALC-SCNC: 14 MMOL/L (ref 8–16)
ANION GAP SERPL CALC-SCNC: 15 MMOL/L
ANION GAP SERPL CALC-SCNC: 15 MMOL/L (ref 8–16)
ANION GAP SERPL CALC-SCNC: 16 MMOL/L
ANION GAP SERPL CALC-SCNC: 16 MMOL/L (ref 8–16)
ANION GAP SERPL CALC-SCNC: 17 MMOL/L
ANION GAP SERPL CALC-SCNC: 17 MMOL/L (ref 8–16)
ANION GAP SERPL CALC-SCNC: 17 MMOL/L (ref 8–16)
ANION GAP SERPL CALC-SCNC: 18 MMOL/L
ANION GAP SERPL CALC-SCNC: 18 MMOL/L (ref 8–16)
ANION GAP SERPL CALC-SCNC: 19 MMOL/L
ANION GAP SERPL CALC-SCNC: 19 MMOL/L (ref 8–16)
ANION GAP SERPL CALC-SCNC: 19 MMOL/L (ref 8–16)
ANION GAP SERPL CALC-SCNC: 20 MMOL/L
ANION GAP SERPL CALC-SCNC: 21 MMOL/L
ANION GAP SERPL CALC-SCNC: 21 MMOL/L (ref 8–16)
ANION GAP SERPL CALC-SCNC: 22 MMOL/L
ANION GAP SERPL CALC-SCNC: 22 MMOL/L (ref 8–16)
ANION GAP SERPL CALC-SCNC: 22 MMOL/L (ref 8–16)
ANION GAP SERPL CALC-SCNC: 23 MMOL/L
ANION GAP SERPL CALC-SCNC: 23 MMOL/L (ref 8–16)
ANION GAP SERPL CALC-SCNC: 25 MMOL/L (ref 8–16)
ANION GAP SERPL CALC-SCNC: 25 MMOL/L (ref 8–16)
ANION GAP SERPL CALC-SCNC: 26 MMOL/L
ANION GAP SERPL CALC-SCNC: 27 MMOL/L (ref 8–16)
ANION GAP SERPL CALC-SCNC: 29 MMOL/L (ref 8–16)
ANION GAP SERPL CALC-SCNC: 31 MMOL/L
ANION GAP SERPL CALC-SCNC: 31 MMOL/L
ANION GAP SERPL CALC-SCNC: 7 MMOL/L (ref 8–16)
ANION GAP SERPL CALC-SCNC: 8 MMOL/L (ref 8–16)
ANION GAP SERPL CALC-SCNC: 8 MMOL/L (ref 8–16)
ANION GAP SERPL CALC-SCNC: 9 MMOL/L (ref 8–16)
ANISOCYTOSIS BLD QL SMEAR: ABNORMAL
ANISOCYTOSIS BLD QL SMEAR: SLIGHT
AORTIC ROOT ANNULUS: 2.93 CM
AORTIC VALVE CUSP SEPERATION: 1.13 CM
APPEARANCE FLD: CLEAR
APPEARANCE FLD: NORMAL
APTT BLDCRRT: 100.7 SEC (ref 21–32)
APTT BLDCRRT: 103.5 SEC (ref 21–32)
APTT BLDCRRT: 21 SEC (ref 21–32)
APTT BLDCRRT: 23.9 SEC (ref 21–32)
APTT BLDCRRT: 25.3 SEC (ref 21–32)
APTT BLDCRRT: 26.8 SEC (ref 21–32)
APTT BLDCRRT: 29.3 SEC (ref 21–32)
APTT BLDCRRT: 29.3 SEC (ref 21–32)
APTT BLDCRRT: 33 SEC (ref 21–32)
APTT BLDCRRT: 34.6 SEC (ref 21–32)
APTT BLDCRRT: 35.4 SEC (ref 21–32)
APTT BLDCRRT: 35.7 SEC (ref 21–32)
APTT BLDCRRT: 37 SEC (ref 21–32)
APTT BLDCRRT: 40.6 SEC (ref 21–32)
APTT BLDCRRT: 40.8 SEC (ref 21–32)
APTT BLDCRRT: 43.6 SEC (ref 21–32)
APTT BLDCRRT: 43.6 SEC (ref 21–32)
APTT BLDCRRT: 44.3 SEC (ref 21–32)
APTT BLDCRRT: 44.4 SEC (ref 21–32)
APTT BLDCRRT: 45.3 SEC (ref 21–32)
APTT BLDCRRT: 45.7 SEC (ref 21–32)
APTT BLDCRRT: 45.7 SEC (ref 21–32)
APTT BLDCRRT: 47.1 SEC (ref 21–32)
APTT BLDCRRT: 50.1 SEC (ref 21–32)
APTT BLDCRRT: 52 SEC (ref 21–32)
APTT BLDCRRT: 55.9 SEC (ref 21–32)
APTT BLDCRRT: 58.7 SEC (ref 21–32)
APTT BLDCRRT: 66 SEC (ref 21–32)
APTT BLDCRRT: 70.2 SEC (ref 21–32)
APTT BLDCRRT: 82.6 SEC (ref 21–32)
APTT BLDCRRT: 95.7 SEC (ref 21–32)
APTT BLDCRRT: <21 SEC (ref 21–32)
APTT BLDCRRT: >150 SEC (ref 21–32)
APTT BLDCRRT: >150 SEC (ref 21–32)
ASCENDING AORTA: 2.53 CM
ASCENDING AORTA: 3.01 CM
ASCENDING AORTA: 3.27 CM
AST SERPL-CCNC: 10 U/L (ref 10–40)
AST SERPL-CCNC: 10 U/L (ref 10–40)
AST SERPL-CCNC: 11 U/L
AST SERPL-CCNC: 11 U/L (ref 10–40)
AST SERPL-CCNC: 11 U/L (ref 10–40)
AST SERPL-CCNC: 12 U/L
AST SERPL-CCNC: 12 U/L (ref 10–40)
AST SERPL-CCNC: 13 U/L
AST SERPL-CCNC: 14 U/L
AST SERPL-CCNC: 14 U/L
AST SERPL-CCNC: 14 U/L (ref 10–40)
AST SERPL-CCNC: 143 U/L (ref 10–40)
AST SERPL-CCNC: 15 U/L
AST SERPL-CCNC: 15 U/L (ref 10–40)
AST SERPL-CCNC: 16 U/L
AST SERPL-CCNC: 16 U/L
AST SERPL-CCNC: 16 U/L (ref 10–40)
AST SERPL-CCNC: 17 U/L
AST SERPL-CCNC: 17 U/L
AST SERPL-CCNC: 17 U/L (ref 10–40)
AST SERPL-CCNC: 18 U/L
AST SERPL-CCNC: 19 U/L
AST SERPL-CCNC: 19 U/L (ref 10–40)
AST SERPL-CCNC: 20 U/L
AST SERPL-CCNC: 20 U/L
AST SERPL-CCNC: 20 U/L (ref 10–40)
AST SERPL-CCNC: 21 U/L
AST SERPL-CCNC: 21 U/L (ref 10–40)
AST SERPL-CCNC: 21 U/L (ref 10–40)
AST SERPL-CCNC: 22 U/L
AST SERPL-CCNC: 24 U/L (ref 10–40)
AST SERPL-CCNC: 24 U/L (ref 10–40)
AST SERPL-CCNC: 25 U/L (ref 10–40)
AST SERPL-CCNC: 26 U/L
AST SERPL-CCNC: 27 U/L
AST SERPL-CCNC: 275 U/L (ref 10–40)
AST SERPL-CCNC: 30 U/L (ref 10–40)
AST SERPL-CCNC: 32 U/L
AST SERPL-CCNC: 34 U/L (ref 10–40)
AST SERPL-CCNC: 354 U/L (ref 10–40)
AST SERPL-CCNC: 354 U/L (ref 10–40)
AST SERPL-CCNC: 40 U/L
AST SERPL-CCNC: 41 U/L (ref 10–40)
AST SERPL-CCNC: 61 U/L (ref 10–40)
AST SERPL-CCNC: 76 U/L (ref 10–40)
AST SERPL-CCNC: 86 U/L (ref 10–40)
AST SERPL-CCNC: 99 U/L (ref 10–40)
AV INDEX (PROSTH): 0.49
AV INDEX (PROSTH): 0.5
AV INDEX (PROSTH): 0.53
AV INDEX (PROSTH): 0.76
AV MEAN GRADIENT: 10.27 MMHG
AV MEAN GRADIENT: 12 MMHG
AV MEAN GRADIENT: 13.91 MMHG
AV MEAN GRADIENT: 3 MMHG
AV PEAK GRADIENT: 14.59 MMHG
AV PEAK GRADIENT: 20.79 MMHG
AV PEAK GRADIENT: 20.98 MMHG
AV PEAK GRADIENT: 3 MMHG
AV VALVE AREA: 1.48 CM2
AV VALVE AREA: 1.53 CM2
AV VALVE AREA: 1.68 CM2
AV VALVE AREA: 2.36 CM2
AV VELOCITY RATIO: 0.44
AV VELOCITY RATIO: 0.5
AV VELOCITY RATIO: 0.51
AV VELOCITY RATIO: 0.7
B-OH-BUTYR BLD STRIP-SCNC: 0.3 MMOL/L
BACTERIA BLD CULT: NORMAL
BACTERIA FLD AEROBE CULT: NORMAL
BACTERIA FLD AEROBE CULT: NORMAL
BACTERIA SPEC AEROBE CULT: NO GROWTH
BACTERIA SPEC AEROBE CULT: NO GROWTH
BACTERIA SPEC ANAEROBE CULT: NORMAL
BACTERIA SPEC ANAEROBE CULT: NORMAL
BACTERIA STL CULT: NORMAL
BACTERIA STL CULT: NORMAL
BASO STIPL BLD QL SMEAR: ABNORMAL
BASOPHILS # BLD AUTO: 0.01 K/UL
BASOPHILS # BLD AUTO: 0.02 K/UL
BASOPHILS # BLD AUTO: 0.02 K/UL (ref 0–0.2)
BASOPHILS # BLD AUTO: 0.03 K/UL
BASOPHILS # BLD AUTO: 0.03 K/UL
BASOPHILS # BLD AUTO: 0.03 K/UL (ref 0–0.2)
BASOPHILS # BLD AUTO: 0.04 K/UL
BASOPHILS # BLD AUTO: 0.04 K/UL (ref 0–0.2)
BASOPHILS # BLD AUTO: 0.05 K/UL (ref 0–0.2)
BASOPHILS # BLD AUTO: 0.06 K/UL
BASOPHILS # BLD AUTO: 0.06 K/UL (ref 0–0.2)
BASOPHILS # BLD AUTO: 0.07 K/UL
BASOPHILS # BLD AUTO: 0.07 K/UL (ref 0–0.2)
BASOPHILS # BLD AUTO: 0.08 K/UL
BASOPHILS # BLD AUTO: 0.12 K/UL (ref 0–0.2)
BASOPHILS # BLD AUTO: 0.17 K/UL (ref 0–0.2)
BASOPHILS NFR BLD: 0 % (ref 0–1.9)
BASOPHILS NFR BLD: 0 % (ref 0–1.9)
BASOPHILS NFR BLD: 0.1 %
BASOPHILS NFR BLD: 0.2 %
BASOPHILS NFR BLD: 0.2 % (ref 0–1.9)
BASOPHILS NFR BLD: 0.3 %
BASOPHILS NFR BLD: 0.3 % (ref 0–1.9)
BASOPHILS NFR BLD: 0.4 %
BASOPHILS NFR BLD: 0.4 % (ref 0–1.9)
BASOPHILS NFR BLD: 0.5 %
BASOPHILS NFR BLD: 0.5 % (ref 0–1.9)
BASOPHILS NFR BLD: 0.6 % (ref 0–1.9)
BASOPHILS NFR BLD: 0.7 %
BASOPHILS NFR BLD: 0.7 % (ref 0–1.9)
BASOPHILS NFR BLD: 0.8 %
BASOPHILS NFR BLD: 0.8 % (ref 0–1.9)
BASOPHILS NFR BLD: 0.9 %
BILIRUB DIRECT SERPL-MCNC: 0.1 MG/DL (ref 0.1–0.3)
BILIRUB DIRECT SERPL-MCNC: 0.2 MG/DL (ref 0.1–0.3)
BILIRUB DIRECT SERPL-MCNC: 0.3 MG/DL (ref 0.1–0.3)
BILIRUB DIRECT SERPL-MCNC: 0.4 MG/DL (ref 0.1–0.3)
BILIRUB DIRECT SERPL-MCNC: 0.6 MG/DL (ref 0.1–0.3)
BILIRUB SERPL-MCNC: 0.1 MG/DL
BILIRUB SERPL-MCNC: 0.1 MG/DL (ref 0.1–1)
BILIRUB SERPL-MCNC: 0.1 MG/DL (ref 0.1–1)
BILIRUB SERPL-MCNC: 0.2 MG/DL
BILIRUB SERPL-MCNC: 0.2 MG/DL (ref 0.1–1)
BILIRUB SERPL-MCNC: 0.3 MG/DL
BILIRUB SERPL-MCNC: 0.3 MG/DL (ref 0.1–1)
BILIRUB SERPL-MCNC: 0.4 MG/DL
BILIRUB SERPL-MCNC: 0.4 MG/DL (ref 0.1–1)
BILIRUB SERPL-MCNC: 0.5 MG/DL
BILIRUB SERPL-MCNC: 0.5 MG/DL (ref 0.1–1)
BILIRUB SERPL-MCNC: 0.6 MG/DL
BILIRUB SERPL-MCNC: 0.6 MG/DL (ref 0.1–1)
BILIRUB SERPL-MCNC: 0.6 MG/DL (ref 0.1–1)
BILIRUB SERPL-MCNC: 0.7 MG/DL
BLD GP AB SCN CELLS X3 SERPL QL: NORMAL
BLD PROD TYP BPU: NORMAL
BLOOD UNIT EXPIRATION DATE: NORMAL
BLOOD UNIT TYPE CODE: 1700
BLOOD UNIT TYPE CODE: 1700
BLOOD UNIT TYPE CODE: 5100
BLOOD UNIT TYPE CODE: 7300
BLOOD UNIT TYPE CODE: 9500
BLOOD UNIT TYPE: NORMAL
BNP SERPL-MCNC: 2173 PG/ML (ref 0–99)
BNP SERPL-MCNC: 2350 PG/ML (ref 0–99)
BNP SERPL-MCNC: 459 PG/ML
BNP SERPL-MCNC: 776 PG/ML
BODY FLD TYPE: NORMAL
BSA FOR ECHO PROCEDURE: 2.08 M2
BSA FOR ECHO PROCEDURE: 2.1 M2
BSA FOR ECHO PROCEDURE: 2.14 M2
BUN SERPL-MCNC: 10 MG/DL (ref 6–20)
BUN SERPL-MCNC: 11 MG/DL (ref 6–20)
BUN SERPL-MCNC: 12 MG/DL (ref 6–20)
BUN SERPL-MCNC: 12 MG/DL (ref 6–20)
BUN SERPL-MCNC: 13 MG/DL (ref 6–20)
BUN SERPL-MCNC: 14 MG/DL (ref 6–20)
BUN SERPL-MCNC: 14 MG/DL (ref 6–20)
BUN SERPL-MCNC: 16 MG/DL (ref 6–20)
BUN SERPL-MCNC: 17 MG/DL (ref 6–20)
BUN SERPL-MCNC: 17 MG/DL (ref 6–20)
BUN SERPL-MCNC: 18 MG/DL (ref 6–20)
BUN SERPL-MCNC: 19 MG/DL (ref 6–20)
BUN SERPL-MCNC: 20 MG/DL
BUN SERPL-MCNC: 20 MG/DL (ref 6–20)
BUN SERPL-MCNC: 20 MG/DL (ref 6–20)
BUN SERPL-MCNC: 21 MG/DL (ref 6–20)
BUN SERPL-MCNC: 23 MG/DL
BUN SERPL-MCNC: 23 MG/DL (ref 6–20)
BUN SERPL-MCNC: 24 MG/DL (ref 6–20)
BUN SERPL-MCNC: 24 MG/DL (ref 6–20)
BUN SERPL-MCNC: 25 MG/DL (ref 6–20)
BUN SERPL-MCNC: 26 MG/DL (ref 6–20)
BUN SERPL-MCNC: 26 MG/DL (ref 6–30)
BUN SERPL-MCNC: 27 MG/DL
BUN SERPL-MCNC: 27 MG/DL (ref 6–20)
BUN SERPL-MCNC: 28 MG/DL (ref 6–20)
BUN SERPL-MCNC: 29 MG/DL
BUN SERPL-MCNC: 29 MG/DL (ref 6–20)
BUN SERPL-MCNC: 30 MG/DL
BUN SERPL-MCNC: 30 MG/DL (ref 6–20)
BUN SERPL-MCNC: 31 MG/DL
BUN SERPL-MCNC: 31 MG/DL (ref 6–20)
BUN SERPL-MCNC: 31 MG/DL (ref 6–20)
BUN SERPL-MCNC: 32 MG/DL
BUN SERPL-MCNC: 32 MG/DL (ref 6–20)
BUN SERPL-MCNC: 33 MG/DL
BUN SERPL-MCNC: 33 MG/DL
BUN SERPL-MCNC: 33 MG/DL (ref 6–20)
BUN SERPL-MCNC: 34 MG/DL
BUN SERPL-MCNC: 34 MG/DL (ref 6–20)
BUN SERPL-MCNC: 34 MG/DL (ref 6–20)
BUN SERPL-MCNC: 35 MG/DL (ref 6–20)
BUN SERPL-MCNC: 36 MG/DL
BUN SERPL-MCNC: 36 MG/DL (ref 6–20)
BUN SERPL-MCNC: 36 MG/DL (ref 6–20)
BUN SERPL-MCNC: 37 MG/DL
BUN SERPL-MCNC: 37 MG/DL
BUN SERPL-MCNC: 37 MG/DL (ref 6–20)
BUN SERPL-MCNC: 38 MG/DL
BUN SERPL-MCNC: 38 MG/DL (ref 6–20)
BUN SERPL-MCNC: 38 MG/DL (ref 6–30)
BUN SERPL-MCNC: 39 MG/DL
BUN SERPL-MCNC: 39 MG/DL (ref 6–20)
BUN SERPL-MCNC: 4 MG/DL (ref 6–20)
BUN SERPL-MCNC: 40 MG/DL (ref 6–20)
BUN SERPL-MCNC: 41 MG/DL
BUN SERPL-MCNC: 42 MG/DL
BUN SERPL-MCNC: 43 MG/DL
BUN SERPL-MCNC: 44 MG/DL
BUN SERPL-MCNC: 44 MG/DL (ref 6–20)
BUN SERPL-MCNC: 45 MG/DL
BUN SERPL-MCNC: 45 MG/DL (ref 6–20)
BUN SERPL-MCNC: 46 MG/DL (ref 6–20)
BUN SERPL-MCNC: 47 MG/DL (ref 6–20)
BUN SERPL-MCNC: 49 MG/DL
BUN SERPL-MCNC: 50 MG/DL
BUN SERPL-MCNC: 51 MG/DL
BUN SERPL-MCNC: 51 MG/DL
BUN SERPL-MCNC: 53 MG/DL
BUN SERPL-MCNC: 54 MG/DL
BUN SERPL-MCNC: 54 MG/DL
BUN SERPL-MCNC: 57 MG/DL
BUN SERPL-MCNC: 58 MG/DL
BUN SERPL-MCNC: 58 MG/DL
BUN SERPL-MCNC: 6 MG/DL (ref 6–20)
BUN SERPL-MCNC: 60 MG/DL
BUN SERPL-MCNC: 61 MG/DL
BUN SERPL-MCNC: 61 MG/DL
BUN SERPL-MCNC: 62 MG/DL
BUN SERPL-MCNC: 67 MG/DL
BUN SERPL-MCNC: 7 MG/DL (ref 6–20)
BUN SERPL-MCNC: 71 MG/DL
BUN SERPL-MCNC: 71 MG/DL
BUN SERPL-MCNC: 8 MG/DL (ref 6–20)
BUN SERPL-MCNC: 9 MG/DL (ref 6–20)
BURR CELLS BLD QL SMEAR: ABNORMAL
C DIFF GDH STL QL: NEGATIVE
C DIFF GDH STL QL: POSITIVE
C DIFF GDH STL QL: POSITIVE
C DIFF TOX A+B STL QL IA: NEGATIVE
C DIFF TOX GENS STL QL NAA+PROBE: NEGATIVE
C DIFF TOX GENS STL QL NAA+PROBE: NEGATIVE
CALCIUM SERPL-MCNC: 10 MG/DL (ref 8.7–10.5)
CALCIUM SERPL-MCNC: 10.3 MG/DL (ref 8.7–10.5)
CALCIUM SERPL-MCNC: 10.4 MG/DL (ref 8.7–10.5)
CALCIUM SERPL-MCNC: 10.5 MG/DL (ref 8.7–10.5)
CALCIUM SERPL-MCNC: 10.5 MG/DL (ref 8.7–10.5)
CALCIUM SERPL-MCNC: 10.6 MG/DL
CALCIUM SERPL-MCNC: 10.6 MG/DL
CALCIUM SERPL-MCNC: 10.6 MG/DL (ref 8.7–10.5)
CALCIUM SERPL-MCNC: 10.7 MG/DL (ref 8.7–10.5)
CALCIUM SERPL-MCNC: 10.8 MG/DL (ref 8.7–10.5)
CALCIUM SERPL-MCNC: 11.1 MG/DL (ref 8.7–10.5)
CALCIUM SERPL-MCNC: 11.2 MG/DL (ref 8.7–10.5)
CALCIUM SERPL-MCNC: 11.4 MG/DL (ref 8.7–10.5)
CALCIUM SERPL-MCNC: 11.4 MG/DL (ref 8.7–10.5)
CALCIUM SERPL-MCNC: 11.6 MG/DL (ref 8.7–10.5)
CALCIUM SERPL-MCNC: 11.6 MG/DL (ref 8.7–10.5)
CALCIUM SERPL-MCNC: 6.8 MG/DL
CALCIUM SERPL-MCNC: 6.8 MG/DL
CALCIUM SERPL-MCNC: 7 MG/DL
CALCIUM SERPL-MCNC: 7.1 MG/DL
CALCIUM SERPL-MCNC: 7.3 MG/DL
CALCIUM SERPL-MCNC: 7.4 MG/DL
CALCIUM SERPL-MCNC: 7.4 MG/DL (ref 8.7–10.5)
CALCIUM SERPL-MCNC: 7.6 MG/DL
CALCIUM SERPL-MCNC: 7.7 MG/DL
CALCIUM SERPL-MCNC: 7.8 MG/DL
CALCIUM SERPL-MCNC: 7.9 MG/DL
CALCIUM SERPL-MCNC: 7.9 MG/DL
CALCIUM SERPL-MCNC: 7.9 MG/DL (ref 8.7–10.5)
CALCIUM SERPL-MCNC: 8 MG/DL
CALCIUM SERPL-MCNC: 8.1 MG/DL
CALCIUM SERPL-MCNC: 8.1 MG/DL (ref 8.7–10.5)
CALCIUM SERPL-MCNC: 8.1 MG/DL (ref 8.7–10.5)
CALCIUM SERPL-MCNC: 8.2 MG/DL
CALCIUM SERPL-MCNC: 8.2 MG/DL (ref 8.7–10.5)
CALCIUM SERPL-MCNC: 8.3 MG/DL
CALCIUM SERPL-MCNC: 8.3 MG/DL (ref 8.7–10.5)
CALCIUM SERPL-MCNC: 8.4 MG/DL
CALCIUM SERPL-MCNC: 8.4 MG/DL (ref 8.7–10.5)
CALCIUM SERPL-MCNC: 8.5 MG/DL (ref 8.7–10.5)
CALCIUM SERPL-MCNC: 8.6 MG/DL (ref 8.7–10.5)
CALCIUM SERPL-MCNC: 8.7 MG/DL (ref 8.7–10.5)
CALCIUM SERPL-MCNC: 8.8 MG/DL
CALCIUM SERPL-MCNC: 8.8 MG/DL
CALCIUM SERPL-MCNC: 8.8 MG/DL (ref 8.7–10.5)
CALCIUM SERPL-MCNC: 8.9 MG/DL
CALCIUM SERPL-MCNC: 8.9 MG/DL (ref 8.7–10.5)
CALCIUM SERPL-MCNC: 9 MG/DL
CALCIUM SERPL-MCNC: 9 MG/DL (ref 8.7–10.5)
CALCIUM SERPL-MCNC: 9.1 MG/DL
CALCIUM SERPL-MCNC: 9.1 MG/DL (ref 8.7–10.5)
CALCIUM SERPL-MCNC: 9.2 MG/DL
CALCIUM SERPL-MCNC: 9.2 MG/DL (ref 8.7–10.5)
CALCIUM SERPL-MCNC: 9.3 MG/DL (ref 8.7–10.5)
CALCIUM SERPL-MCNC: 9.4 MG/DL
CALCIUM SERPL-MCNC: 9.4 MG/DL (ref 8.7–10.5)
CALCIUM SERPL-MCNC: 9.5 MG/DL (ref 8.7–10.5)
CALCIUM SERPL-MCNC: 9.6 MG/DL (ref 8.7–10.5)
CALCIUM SERPL-MCNC: 9.7 MG/DL (ref 8.7–10.5)
CALCIUM SERPL-MCNC: 9.8 MG/DL
CALCIUM SERPL-MCNC: 9.8 MG/DL (ref 8.7–10.5)
CHLORIDE SERPL-SCNC: 100 MMOL/L
CHLORIDE SERPL-SCNC: 100 MMOL/L (ref 95–110)
CHLORIDE SERPL-SCNC: 101 MMOL/L (ref 95–110)
CHLORIDE SERPL-SCNC: 102 MMOL/L (ref 95–110)
CHLORIDE SERPL-SCNC: 103 MMOL/L (ref 95–110)
CHLORIDE SERPL-SCNC: 104 MMOL/L
CHLORIDE SERPL-SCNC: 104 MMOL/L
CHLORIDE SERPL-SCNC: 104 MMOL/L (ref 95–110)
CHLORIDE SERPL-SCNC: 105 MMOL/L
CHLORIDE SERPL-SCNC: 105 MMOL/L (ref 95–110)
CHLORIDE SERPL-SCNC: 105 MMOL/L (ref 95–110)
CHLORIDE SERPL-SCNC: 106 MMOL/L (ref 95–110)
CHLORIDE SERPL-SCNC: 107 MMOL/L
CHLORIDE SERPL-SCNC: 107 MMOL/L (ref 95–110)
CHLORIDE SERPL-SCNC: 81 MMOL/L
CHLORIDE SERPL-SCNC: 81 MMOL/L
CHLORIDE SERPL-SCNC: 82 MMOL/L
CHLORIDE SERPL-SCNC: 83 MMOL/L
CHLORIDE SERPL-SCNC: 87 MMOL/L
CHLORIDE SERPL-SCNC: 88 MMOL/L
CHLORIDE SERPL-SCNC: 89 MMOL/L
CHLORIDE SERPL-SCNC: 89 MMOL/L
CHLORIDE SERPL-SCNC: 90 MMOL/L
CHLORIDE SERPL-SCNC: 90 MMOL/L (ref 95–110)
CHLORIDE SERPL-SCNC: 91 MMOL/L
CHLORIDE SERPL-SCNC: 91 MMOL/L (ref 95–110)
CHLORIDE SERPL-SCNC: 92 MMOL/L
CHLORIDE SERPL-SCNC: 92 MMOL/L (ref 95–110)
CHLORIDE SERPL-SCNC: 93 MMOL/L
CHLORIDE SERPL-SCNC: 93 MMOL/L (ref 95–110)
CHLORIDE SERPL-SCNC: 94 MMOL/L
CHLORIDE SERPL-SCNC: 94 MMOL/L (ref 95–110)
CHLORIDE SERPL-SCNC: 94 MMOL/L (ref 95–110)
CHLORIDE SERPL-SCNC: 95 MMOL/L (ref 95–110)
CHLORIDE SERPL-SCNC: 96 MMOL/L
CHLORIDE SERPL-SCNC: 96 MMOL/L
CHLORIDE SERPL-SCNC: 96 MMOL/L (ref 95–110)
CHLORIDE SERPL-SCNC: 96 MMOL/L (ref 95–110)
CHLORIDE SERPL-SCNC: 97 MMOL/L
CHLORIDE SERPL-SCNC: 97 MMOL/L (ref 95–110)
CHLORIDE SERPL-SCNC: 97 MMOL/L (ref 95–110)
CHLORIDE SERPL-SCNC: 98 MMOL/L (ref 95–110)
CHLORIDE SERPL-SCNC: 99 MMOL/L
CHLORIDE SERPL-SCNC: 99 MMOL/L (ref 95–110)
CHLORIDE STL-SCNC: 65 MMOL/L
CK MB SERPL-MCNC: 4.8 NG/ML (ref 0.1–6.5)
CK MB SERPL-RTO: 2.8 % (ref 0–5)
CK SERPL-CCNC: 172 U/L (ref 20–180)
CK SERPL-CCNC: 172 U/L (ref 20–180)
CO2 SERPL-SCNC: 13 MMOL/L (ref 23–29)
CO2 SERPL-SCNC: 14 MMOL/L (ref 23–29)
CO2 SERPL-SCNC: 16 MMOL/L
CO2 SERPL-SCNC: 16 MMOL/L (ref 23–29)
CO2 SERPL-SCNC: 17 MMOL/L
CO2 SERPL-SCNC: 17 MMOL/L
CO2 SERPL-SCNC: 17 MMOL/L (ref 23–29)
CO2 SERPL-SCNC: 17 MMOL/L (ref 23–29)
CO2 SERPL-SCNC: 18 MMOL/L
CO2 SERPL-SCNC: 18 MMOL/L (ref 23–29)
CO2 SERPL-SCNC: 19 MMOL/L
CO2 SERPL-SCNC: 19 MMOL/L (ref 23–29)
CO2 SERPL-SCNC: 20 MMOL/L
CO2 SERPL-SCNC: 20 MMOL/L (ref 23–29)
CO2 SERPL-SCNC: 21 MMOL/L
CO2 SERPL-SCNC: 21 MMOL/L (ref 23–29)
CO2 SERPL-SCNC: 22 MMOL/L
CO2 SERPL-SCNC: 22 MMOL/L (ref 23–29)
CO2 SERPL-SCNC: 23 MMOL/L
CO2 SERPL-SCNC: 23 MMOL/L (ref 23–29)
CO2 SERPL-SCNC: 24 MMOL/L
CO2 SERPL-SCNC: 24 MMOL/L (ref 23–29)
CO2 SERPL-SCNC: 25 MMOL/L
CO2 SERPL-SCNC: 25 MMOL/L (ref 23–29)
CO2 SERPL-SCNC: 26 MMOL/L
CO2 SERPL-SCNC: 26 MMOL/L (ref 23–29)
CO2 SERPL-SCNC: 27 MMOL/L
CO2 SERPL-SCNC: 27 MMOL/L
CO2 SERPL-SCNC: 27 MMOL/L (ref 23–29)
CO2 SERPL-SCNC: 28 MMOL/L
CO2 SERPL-SCNC: 29 MMOL/L (ref 23–29)
CO2 SERPL-SCNC: 30 MMOL/L (ref 23–29)
CO2 SERPL-SCNC: 31 MMOL/L (ref 23–29)
CODING SYSTEM: NORMAL
COLOR FLD: COLORLESS
COLOR FLD: NORMAL
COLOR FLD: NORMAL
CREAT SERPL-MCNC: 1.7 MG/DL (ref 0.5–1.4)
CREAT SERPL-MCNC: 1.8 MG/DL (ref 0.5–1.4)
CREAT SERPL-MCNC: 1.9 MG/DL (ref 0.5–1.4)
CREAT SERPL-MCNC: 10 MG/DL
CREAT SERPL-MCNC: 10 MG/DL (ref 0.5–1.4)
CREAT SERPL-MCNC: 10.2 MG/DL
CREAT SERPL-MCNC: 10.2 MG/DL (ref 0.5–1.4)
CREAT SERPL-MCNC: 10.6 MG/DL (ref 0.5–1.4)
CREAT SERPL-MCNC: 10.7 MG/DL
CREAT SERPL-MCNC: 10.7 MG/DL (ref 0.5–1.4)
CREAT SERPL-MCNC: 10.9 MG/DL
CREAT SERPL-MCNC: 10.9 MG/DL
CREAT SERPL-MCNC: 11.3 MG/DL
CREAT SERPL-MCNC: 11.4 MG/DL
CREAT SERPL-MCNC: 2.3 MG/DL (ref 0.5–1.4)
CREAT SERPL-MCNC: 2.3 MG/DL (ref 0.5–1.4)
CREAT SERPL-MCNC: 2.5 MG/DL (ref 0.5–1.4)
CREAT SERPL-MCNC: 2.8 MG/DL (ref 0.5–1.4)
CREAT SERPL-MCNC: 3.1 MG/DL (ref 0.5–1.4)
CREAT SERPL-MCNC: 3.1 MG/DL (ref 0.5–1.4)
CREAT SERPL-MCNC: 3.4 MG/DL (ref 0.5–1.4)
CREAT SERPL-MCNC: 3.4 MG/DL (ref 0.5–1.4)
CREAT SERPL-MCNC: 3.5 MG/DL (ref 0.5–1.4)
CREAT SERPL-MCNC: 3.6 MG/DL (ref 0.5–1.4)
CREAT SERPL-MCNC: 3.6 MG/DL (ref 0.5–1.4)
CREAT SERPL-MCNC: 3.7 MG/DL (ref 0.5–1.4)
CREAT SERPL-MCNC: 3.8 MG/DL (ref 0.5–1.4)
CREAT SERPL-MCNC: 4.4 MG/DL (ref 0.5–1.4)
CREAT SERPL-MCNC: 4.4 MG/DL (ref 0.5–1.4)
CREAT SERPL-MCNC: 4.5 MG/DL (ref 0.5–1.4)
CREAT SERPL-MCNC: 4.6 MG/DL (ref 0.5–1.4)
CREAT SERPL-MCNC: 4.6 MG/DL (ref 0.5–1.4)
CREAT SERPL-MCNC: 4.7 MG/DL (ref 0.5–1.4)
CREAT SERPL-MCNC: 4.8 MG/DL (ref 0.5–1.4)
CREAT SERPL-MCNC: 4.9 MG/DL (ref 0.5–1.4)
CREAT SERPL-MCNC: 5 MG/DL (ref 0.5–1.4)
CREAT SERPL-MCNC: 5.1 MG/DL (ref 0.5–1.4)
CREAT SERPL-MCNC: 5.2 MG/DL (ref 0.5–1.4)
CREAT SERPL-MCNC: 5.3 MG/DL (ref 0.5–1.4)
CREAT SERPL-MCNC: 5.4 MG/DL (ref 0.5–1.4)
CREAT SERPL-MCNC: 5.5 MG/DL (ref 0.5–1.4)
CREAT SERPL-MCNC: 5.5 MG/DL (ref 0.5–1.4)
CREAT SERPL-MCNC: 5.6 MG/DL (ref 0.5–1.4)
CREAT SERPL-MCNC: 5.6 MG/DL (ref 0.5–1.4)
CREAT SERPL-MCNC: 5.8 MG/DL
CREAT SERPL-MCNC: 5.8 MG/DL (ref 0.5–1.4)
CREAT SERPL-MCNC: 5.9 MG/DL (ref 0.5–1.4)
CREAT SERPL-MCNC: 6 MG/DL (ref 0.5–1.4)
CREAT SERPL-MCNC: 6 MG/DL (ref 0.5–1.4)
CREAT SERPL-MCNC: 6.1 MG/DL (ref 0.5–1.4)
CREAT SERPL-MCNC: 6.2 MG/DL (ref 0.5–1.4)
CREAT SERPL-MCNC: 6.2 MG/DL (ref 0.5–1.4)
CREAT SERPL-MCNC: 6.4 MG/DL (ref 0.5–1.4)
CREAT SERPL-MCNC: 6.6 MG/DL
CREAT SERPL-MCNC: 6.6 MG/DL (ref 0.5–1.4)
CREAT SERPL-MCNC: 6.7 MG/DL (ref 0.5–1.4)
CREAT SERPL-MCNC: 6.7 MG/DL (ref 0.5–1.4)
CREAT SERPL-MCNC: 6.8 MG/DL (ref 0.5–1.4)
CREAT SERPL-MCNC: 6.8 MG/DL (ref 0.5–1.4)
CREAT SERPL-MCNC: 6.9 MG/DL (ref 0.5–1.4)
CREAT SERPL-MCNC: 7 MG/DL (ref 0.5–1.4)
CREAT SERPL-MCNC: 7.1 MG/DL (ref 0.5–1.4)
CREAT SERPL-MCNC: 7.2 MG/DL
CREAT SERPL-MCNC: 7.2 MG/DL (ref 0.5–1.4)
CREAT SERPL-MCNC: 7.2 MG/DL (ref 0.5–1.4)
CREAT SERPL-MCNC: 7.3 MG/DL
CREAT SERPL-MCNC: 7.4 MG/DL
CREAT SERPL-MCNC: 7.4 MG/DL (ref 0.5–1.4)
CREAT SERPL-MCNC: 7.5 MG/DL
CREAT SERPL-MCNC: 7.5 MG/DL
CREAT SERPL-MCNC: 7.8 MG/DL
CREAT SERPL-MCNC: 7.8 MG/DL
CREAT SERPL-MCNC: 7.8 MG/DL (ref 0.5–1.4)
CREAT SERPL-MCNC: 7.8 MG/DL (ref 0.5–1.4)
CREAT SERPL-MCNC: 7.9 MG/DL
CREAT SERPL-MCNC: 8 MG/DL
CREAT SERPL-MCNC: 8 MG/DL (ref 0.5–1.4)
CREAT SERPL-MCNC: 8.1 MG/DL
CREAT SERPL-MCNC: 8.2 MG/DL
CREAT SERPL-MCNC: 8.3 MG/DL
CREAT SERPL-MCNC: 8.3 MG/DL (ref 0.5–1.4)
CREAT SERPL-MCNC: 8.6 MG/DL
CREAT SERPL-MCNC: 8.6 MG/DL (ref 0.5–1.4)
CREAT SERPL-MCNC: 8.7 MG/DL
CREAT SERPL-MCNC: 8.7 MG/DL (ref 0.5–1.4)
CREAT SERPL-MCNC: 8.8 MG/DL
CREAT SERPL-MCNC: 8.8 MG/DL (ref 0.5–1.4)
CREAT SERPL-MCNC: 8.8 MG/DL (ref 0.5–1.4)
CREAT SERPL-MCNC: 8.9 MG/DL
CREAT SERPL-MCNC: 8.9 MG/DL
CREAT SERPL-MCNC: 9.1 MG/DL
CREAT SERPL-MCNC: 9.1 MG/DL (ref 0.5–1.4)
CREAT SERPL-MCNC: 9.1 MG/DL (ref 0.5–1.4)
CREAT SERPL-MCNC: 9.3 MG/DL
CREAT SERPL-MCNC: 9.4 MG/DL
CREAT SERPL-MCNC: 9.5 MG/DL
CREAT SERPL-MCNC: 9.5 MG/DL
CREAT SERPL-MCNC: 9.6 MG/DL
CREAT SERPL-MCNC: 9.6 MG/DL
CREAT SERPL-MCNC: 9.8 MG/DL
CV ECHO LV RWT: 0.36 CM
CV ECHO LV RWT: 0.4 CM
CV ECHO LV RWT: 0.49 CM
CV ECHO LV RWT: 0.54 CM
DACRYOCYTES BLD QL SMEAR: ABNORMAL
DELSYS: ABNORMAL
DIFFERENTIAL METHOD: ABNORMAL
DISPENSE STATUS: NORMAL
DOP CALC AO PEAK VEL: 0.9 M/S
DOP CALC AO PEAK VEL: 1.91 M/S
DOP CALC AO PEAK VEL: 2.28 M/S
DOP CALC AO PEAK VEL: 2.29 M/S
DOP CALC AO VTI: 12.94 CM
DOP CALC AO VTI: 22.93 CM
DOP CALC AO VTI: 31.42 CM
DOP CALC AO VTI: 37.8 CM
DOP CALC LVOT AREA: 3.02 CM2
DOP CALC LVOT AREA: 3.08 CM2
DOP CALC LVOT AREA: 3.1 CM2
DOP CALC LVOT AREA: 3.17 CM2
DOP CALC LVOT DIAMETER: 1.96 CM
DOP CALC LVOT DIAMETER: 1.98 CM
DOP CALC LVOT DIAMETER: 1.99 CM
DOP CALC LVOT DIAMETER: 2.01 CM
DOP CALC LVOT PEAK VEL: 0.63 M/S
DOP CALC LVOT PEAK VEL: 0.98 M/S
DOP CALC LVOT PEAK VEL: 1 M/S
DOP CALC LVOT PEAK VEL: 1.13 M/S
DOP CALC LVOT STROKE VOLUME: 30.59 CM3
DOP CALC LVOT STROKE VOLUME: 34.05 CM3
DOP CALC LVOT STROKE VOLUME: 47.92 CM3
DOP CALC LVOT STROKE VOLUME: 63.43 CM3
DOP CALCLVOT PEAK VEL VTI: 11.29 CM
DOP CALCLVOT PEAK VEL VTI: 15.57 CM
DOP CALCLVOT PEAK VEL VTI: 20 CM
DOP CALCLVOT PEAK VEL VTI: 9.84 CM
E WAVE DECELERATION TIME: 154.4 MSEC
E WAVE DECELERATION TIME: 158.3 MSEC
E WAVE DECELERATION TIME: 207.64 MSEC
E/A RATIO: 0.75
E/A RATIO: 1.34
E/A RATIO: 1.37
E/E' RATIO: 10.7
E/E' RATIO: 12.18
E/E' RATIO: 15.8 M/S
ECHO LV POSTERIOR WALL: 0.9 CM (ref 0.6–1.1)
ECHO LV POSTERIOR WALL: 1.04 CM (ref 0.6–1.1)
ECHO LV POSTERIOR WALL: 1.1 CM (ref 0.6–1.1)
ECHO LV POSTERIOR WALL: 1.21 CM (ref 0.6–1.1)
EOSINOPHIL # BLD AUTO: 0 K/UL
EOSINOPHIL # BLD AUTO: 0 K/UL (ref 0–0.5)
EOSINOPHIL # BLD AUTO: 0.1 K/UL
EOSINOPHIL # BLD AUTO: 0.1 K/UL (ref 0–0.5)
EOSINOPHIL # BLD AUTO: 0.2 K/UL
EOSINOPHIL # BLD AUTO: 0.2 K/UL (ref 0–0.5)
EOSINOPHIL # BLD AUTO: 0.3 K/UL
EOSINOPHIL # BLD AUTO: 0.3 K/UL (ref 0–0.5)
EOSINOPHIL # BLD AUTO: 0.4 K/UL (ref 0–0.5)
EOSINOPHIL NFR BLD: 0 % (ref 0–8)
EOSINOPHIL NFR BLD: 0.1 %
EOSINOPHIL NFR BLD: 0.1 %
EOSINOPHIL NFR BLD: 0.1 % (ref 0–8)
EOSINOPHIL NFR BLD: 0.2 %
EOSINOPHIL NFR BLD: 0.2 % (ref 0–8)
EOSINOPHIL NFR BLD: 0.3 % (ref 0–8)
EOSINOPHIL NFR BLD: 0.4 %
EOSINOPHIL NFR BLD: 0.4 % (ref 0–8)
EOSINOPHIL NFR BLD: 0.5 %
EOSINOPHIL NFR BLD: 0.6 % (ref 0–8)
EOSINOPHIL NFR BLD: 0.7 %
EOSINOPHIL NFR BLD: 0.8 %
EOSINOPHIL NFR BLD: 0.8 %
EOSINOPHIL NFR BLD: 0.8 % (ref 0–8)
EOSINOPHIL NFR BLD: 0.9 %
EOSINOPHIL NFR BLD: 1 %
EOSINOPHIL NFR BLD: 1 % (ref 0–8)
EOSINOPHIL NFR BLD: 1 % (ref 0–8)
EOSINOPHIL NFR BLD: 1.1 %
EOSINOPHIL NFR BLD: 1.2 %
EOSINOPHIL NFR BLD: 1.2 % (ref 0–8)
EOSINOPHIL NFR BLD: 1.2 % (ref 0–8)
EOSINOPHIL NFR BLD: 1.3 %
EOSINOPHIL NFR BLD: 1.3 %
EOSINOPHIL NFR BLD: 1.3 % (ref 0–8)
EOSINOPHIL NFR BLD: 1.4 %
EOSINOPHIL NFR BLD: 1.4 % (ref 0–8)
EOSINOPHIL NFR BLD: 1.5 % (ref 0–8)
EOSINOPHIL NFR BLD: 1.6 % (ref 0–8)
EOSINOPHIL NFR BLD: 1.7 % (ref 0–8)
EOSINOPHIL NFR BLD: 1.8 % (ref 0–8)
EOSINOPHIL NFR BLD: 1.9 %
EOSINOPHIL NFR BLD: 1.9 % (ref 0–8)
EOSINOPHIL NFR BLD: 2 % (ref 0–8)
EOSINOPHIL NFR BLD: 2.1 %
EOSINOPHIL NFR BLD: 2.1 % (ref 0–8)
EOSINOPHIL NFR BLD: 2.2 % (ref 0–8)
EOSINOPHIL NFR BLD: 2.3 % (ref 0–8)
EOSINOPHIL NFR BLD: 2.4 % (ref 0–8)
EOSINOPHIL NFR BLD: 2.7 % (ref 0–8)
EOSINOPHIL NFR BLD: 2.7 % (ref 0–8)
EOSINOPHIL NFR BLD: 2.9 %
EOSINOPHIL NFR BLD: 2.9 % (ref 0–8)
EOSINOPHIL NFR BLD: 2.9 % (ref 0–8)
EOSINOPHIL NFR BLD: 3.1 % (ref 0–8)
EOSINOPHIL NFR BLD: 3.2 % (ref 0–8)
EOSINOPHIL NFR BLD: 3.6 % (ref 0–8)
EOSINOPHIL NFR BLD: 4.2 % (ref 0–8)
EOSINOPHIL NFR FLD MANUAL: 1 %
ERYTHROCYTE [DISTWIDTH] IN BLOOD BY AUTOMATED COUNT: 15.7 %
ERYTHROCYTE [DISTWIDTH] IN BLOOD BY AUTOMATED COUNT: 15.9 %
ERYTHROCYTE [DISTWIDTH] IN BLOOD BY AUTOMATED COUNT: 16 %
ERYTHROCYTE [DISTWIDTH] IN BLOOD BY AUTOMATED COUNT: 16.1 %
ERYTHROCYTE [DISTWIDTH] IN BLOOD BY AUTOMATED COUNT: 16.3 %
ERYTHROCYTE [DISTWIDTH] IN BLOOD BY AUTOMATED COUNT: 16.3 %
ERYTHROCYTE [DISTWIDTH] IN BLOOD BY AUTOMATED COUNT: 16.4 %
ERYTHROCYTE [DISTWIDTH] IN BLOOD BY AUTOMATED COUNT: 16.5 %
ERYTHROCYTE [DISTWIDTH] IN BLOOD BY AUTOMATED COUNT: 16.5 %
ERYTHROCYTE [DISTWIDTH] IN BLOOD BY AUTOMATED COUNT: 16.5 % (ref 11.5–14.5)
ERYTHROCYTE [DISTWIDTH] IN BLOOD BY AUTOMATED COUNT: 16.6 %
ERYTHROCYTE [DISTWIDTH] IN BLOOD BY AUTOMATED COUNT: 16.8 %
ERYTHROCYTE [DISTWIDTH] IN BLOOD BY AUTOMATED COUNT: 16.8 %
ERYTHROCYTE [DISTWIDTH] IN BLOOD BY AUTOMATED COUNT: 16.9 % (ref 11.5–14.5)
ERYTHROCYTE [DISTWIDTH] IN BLOOD BY AUTOMATED COUNT: 17 %
ERYTHROCYTE [DISTWIDTH] IN BLOOD BY AUTOMATED COUNT: 17 % (ref 11.5–14.5)
ERYTHROCYTE [DISTWIDTH] IN BLOOD BY AUTOMATED COUNT: 17.1 %
ERYTHROCYTE [DISTWIDTH] IN BLOOD BY AUTOMATED COUNT: 17.1 % (ref 11.5–14.5)
ERYTHROCYTE [DISTWIDTH] IN BLOOD BY AUTOMATED COUNT: 17.2 %
ERYTHROCYTE [DISTWIDTH] IN BLOOD BY AUTOMATED COUNT: 17.2 % (ref 11.5–14.5)
ERYTHROCYTE [DISTWIDTH] IN BLOOD BY AUTOMATED COUNT: 17.3 % (ref 11.5–14.5)
ERYTHROCYTE [DISTWIDTH] IN BLOOD BY AUTOMATED COUNT: 17.4 %
ERYTHROCYTE [DISTWIDTH] IN BLOOD BY AUTOMATED COUNT: 17.4 % (ref 11.5–14.5)
ERYTHROCYTE [DISTWIDTH] IN BLOOD BY AUTOMATED COUNT: 17.5 %
ERYTHROCYTE [DISTWIDTH] IN BLOOD BY AUTOMATED COUNT: 17.5 % (ref 11.5–14.5)
ERYTHROCYTE [DISTWIDTH] IN BLOOD BY AUTOMATED COUNT: 17.6 % (ref 11.5–14.5)
ERYTHROCYTE [DISTWIDTH] IN BLOOD BY AUTOMATED COUNT: 17.7 % (ref 11.5–14.5)
ERYTHROCYTE [DISTWIDTH] IN BLOOD BY AUTOMATED COUNT: 17.8 %
ERYTHROCYTE [DISTWIDTH] IN BLOOD BY AUTOMATED COUNT: 17.8 % (ref 11.5–14.5)
ERYTHROCYTE [DISTWIDTH] IN BLOOD BY AUTOMATED COUNT: 17.9 % (ref 11.5–14.5)
ERYTHROCYTE [DISTWIDTH] IN BLOOD BY AUTOMATED COUNT: 18 % (ref 11.5–14.5)
ERYTHROCYTE [DISTWIDTH] IN BLOOD BY AUTOMATED COUNT: 18 % (ref 11.5–14.5)
ERYTHROCYTE [DISTWIDTH] IN BLOOD BY AUTOMATED COUNT: 18.1 % (ref 11.5–14.5)
ERYTHROCYTE [DISTWIDTH] IN BLOOD BY AUTOMATED COUNT: 18.1 % (ref 11.5–14.5)
ERYTHROCYTE [DISTWIDTH] IN BLOOD BY AUTOMATED COUNT: 18.2 % (ref 11.5–14.5)
ERYTHROCYTE [DISTWIDTH] IN BLOOD BY AUTOMATED COUNT: 18.4 %
ERYTHROCYTE [DISTWIDTH] IN BLOOD BY AUTOMATED COUNT: 18.5 % (ref 11.5–14.5)
ERYTHROCYTE [DISTWIDTH] IN BLOOD BY AUTOMATED COUNT: 18.6 % (ref 11.5–14.5)
ERYTHROCYTE [DISTWIDTH] IN BLOOD BY AUTOMATED COUNT: 18.7 % (ref 11.5–14.5)
ERYTHROCYTE [DISTWIDTH] IN BLOOD BY AUTOMATED COUNT: 18.9 %
ERYTHROCYTE [DISTWIDTH] IN BLOOD BY AUTOMATED COUNT: 18.9 %
ERYTHROCYTE [DISTWIDTH] IN BLOOD BY AUTOMATED COUNT: 19 % (ref 11.5–14.5)
ERYTHROCYTE [DISTWIDTH] IN BLOOD BY AUTOMATED COUNT: 19.1 % (ref 11.5–14.5)
ERYTHROCYTE [DISTWIDTH] IN BLOOD BY AUTOMATED COUNT: 19.2 %
ERYTHROCYTE [DISTWIDTH] IN BLOOD BY AUTOMATED COUNT: 19.3 %
ERYTHROCYTE [DISTWIDTH] IN BLOOD BY AUTOMATED COUNT: 19.3 % (ref 11.5–14.5)
ERYTHROCYTE [DISTWIDTH] IN BLOOD BY AUTOMATED COUNT: 19.4 % (ref 11.5–14.5)
ERYTHROCYTE [DISTWIDTH] IN BLOOD BY AUTOMATED COUNT: 19.5 %
ERYTHROCYTE [DISTWIDTH] IN BLOOD BY AUTOMATED COUNT: 19.5 %
ERYTHROCYTE [DISTWIDTH] IN BLOOD BY AUTOMATED COUNT: 19.6 % (ref 11.5–14.5)
ERYTHROCYTE [DISTWIDTH] IN BLOOD BY AUTOMATED COUNT: 19.7 % (ref 11.5–14.5)
ERYTHROCYTE [DISTWIDTH] IN BLOOD BY AUTOMATED COUNT: 19.7 % (ref 11.5–14.5)
ERYTHROCYTE [DISTWIDTH] IN BLOOD BY AUTOMATED COUNT: 19.8 % (ref 11.5–14.5)
ERYTHROCYTE [DISTWIDTH] IN BLOOD BY AUTOMATED COUNT: 19.8 % (ref 11.5–14.5)
ERYTHROCYTE [DISTWIDTH] IN BLOOD BY AUTOMATED COUNT: 20 %
ERYTHROCYTE [DISTWIDTH] IN BLOOD BY AUTOMATED COUNT: 20 % (ref 11.5–14.5)
ERYTHROCYTE [DISTWIDTH] IN BLOOD BY AUTOMATED COUNT: 20 % (ref 11.5–14.5)
ERYTHROCYTE [DISTWIDTH] IN BLOOD BY AUTOMATED COUNT: 20.3 % (ref 11.5–14.5)
ERYTHROCYTE [DISTWIDTH] IN BLOOD BY AUTOMATED COUNT: 20.4 % (ref 11.5–14.5)
ERYTHROCYTE [DISTWIDTH] IN BLOOD BY AUTOMATED COUNT: 20.5 %
ERYTHROCYTE [DISTWIDTH] IN BLOOD BY AUTOMATED COUNT: 20.7 %
ERYTHROCYTE [DISTWIDTH] IN BLOOD BY AUTOMATED COUNT: 20.9 % (ref 11.5–14.5)
ERYTHROCYTE [DISTWIDTH] IN BLOOD BY AUTOMATED COUNT: 20.9 % (ref 11.5–14.5)
ERYTHROCYTE [DISTWIDTH] IN BLOOD BY AUTOMATED COUNT: 21 % (ref 11.5–14.5)
ERYTHROCYTE [DISTWIDTH] IN BLOOD BY AUTOMATED COUNT: 21.1 % (ref 11.5–14.5)
ERYTHROCYTE [DISTWIDTH] IN BLOOD BY AUTOMATED COUNT: 21.4 % (ref 11.5–14.5)
ERYTHROCYTE [DISTWIDTH] IN BLOOD BY AUTOMATED COUNT: 22.8 % (ref 11.5–14.5)
ERYTHROCYTE [DISTWIDTH] IN BLOOD BY AUTOMATED COUNT: 23.9 % (ref 11.5–14.5)
ERYTHROCYTE [DISTWIDTH] IN BLOOD BY AUTOMATED COUNT: 23.9 % (ref 11.5–14.5)
ERYTHROCYTE [SEDIMENTATION RATE] IN BLOOD BY WESTERGREN METHOD: 14 MM/H
EST. GFR  (AFRICAN AMERICAN): 10 ML/MIN/1.73 M^2
EST. GFR  (AFRICAN AMERICAN): 10.2 ML/MIN/1.73 M^2
EST. GFR  (AFRICAN AMERICAN): 10.4 ML/MIN/1.73 M^2
EST. GFR  (AFRICAN AMERICAN): 10.7 ML/MIN/1.73 M^2
EST. GFR  (AFRICAN AMERICAN): 10.9 ML/MIN/1.73 M^2
EST. GFR  (AFRICAN AMERICAN): 11.2 ML/MIN/1.73 M^2
EST. GFR  (AFRICAN AMERICAN): 11.5 ML/MIN/1.73 M^2
EST. GFR  (AFRICAN AMERICAN): 11.8 ML/MIN/1.73 M^2
EST. GFR  (AFRICAN AMERICAN): 12.1 ML/MIN/1.73 M^2
EST. GFR  (AFRICAN AMERICAN): 12.1 ML/MIN/1.73 M^2
EST. GFR  (AFRICAN AMERICAN): 12.4 ML/MIN/1.73 M^2
EST. GFR  (AFRICAN AMERICAN): 12.7 ML/MIN/1.73 M^2
EST. GFR  (AFRICAN AMERICAN): 12.7 ML/MIN/1.73 M^2
EST. GFR  (AFRICAN AMERICAN): 15.2 ML/MIN/1.73 M^2
EST. GFR  (AFRICAN AMERICAN): 15.7 ML/MIN/1.73 M^2
EST. GFR  (AFRICAN AMERICAN): 16.2 ML/MIN/1.73 M^2
EST. GFR  (AFRICAN AMERICAN): 16.2 ML/MIN/1.73 M^2
EST. GFR  (AFRICAN AMERICAN): 16.8 ML/MIN/1.73 M^2
EST. GFR  (AFRICAN AMERICAN): 17.4 ML/MIN/1.73 M^2
EST. GFR  (AFRICAN AMERICAN): 17.4 ML/MIN/1.73 M^2
EST. GFR  (AFRICAN AMERICAN): 19.5 ML/MIN/1.73 M^2
EST. GFR  (AFRICAN AMERICAN): 19.5 ML/MIN/1.73 M^2
EST. GFR  (AFRICAN AMERICAN): 22 ML/MIN/1.73 M^2
EST. GFR  (AFRICAN AMERICAN): 25.2 ML/MIN/1.73 M^2
EST. GFR  (AFRICAN AMERICAN): 27.9 ML/MIN/1.73 M^2
EST. GFR  (AFRICAN AMERICAN): 27.9 ML/MIN/1.73 M^2
EST. GFR  (AFRICAN AMERICAN): 35.2 ML/MIN/1.73 M^2
EST. GFR  (AFRICAN AMERICAN): 37.6 ML/MIN/1.73 M^2
EST. GFR  (AFRICAN AMERICAN): 4 ML/MIN/1.73 M^2
EST. GFR  (AFRICAN AMERICAN): 4.4 ML/MIN/1.73 M^2
EST. GFR  (AFRICAN AMERICAN): 4.4 ML/MIN/1.73 M^2
EST. GFR  (AFRICAN AMERICAN): 4.6 ML/MIN/1.73 M^2
EST. GFR  (AFRICAN AMERICAN): 4.7 ML/MIN/1.73 M^2
EST. GFR  (AFRICAN AMERICAN): 40.2 ML/MIN/1.73 M^2
EST. GFR  (AFRICAN AMERICAN): 5 ML/MIN/1.73 M^2
EST. GFR  (AFRICAN AMERICAN): 5.3 ML/MIN/1.73 M^2
EST. GFR  (AFRICAN AMERICAN): 5.3 ML/MIN/1.73 M^2
EST. GFR  (AFRICAN AMERICAN): 5.4 ML/MIN/1.73 M^2
EST. GFR  (AFRICAN AMERICAN): 5.5 ML/MIN/1.73 M^2
EST. GFR  (AFRICAN AMERICAN): 5.6 ML/MIN/1.73 M^2
EST. GFR  (AFRICAN AMERICAN): 5.7 ML/MIN/1.73 M^2
EST. GFR  (AFRICAN AMERICAN): 5.7 ML/MIN/1.73 M^2
EST. GFR  (AFRICAN AMERICAN): 5.9 ML/MIN/1.73 M^2
EST. GFR  (AFRICAN AMERICAN): 6 ML/MIN/1.73 M^2
EST. GFR  (AFRICAN AMERICAN): 6.2 ML/MIN/1.73 M^2
EST. GFR  (AFRICAN AMERICAN): 6.4 ML/MIN/1.73 M^2
EST. GFR  (AFRICAN AMERICAN): 6.4 ML/MIN/1.73 M^2
EST. GFR  (AFRICAN AMERICAN): 6.8 ML/MIN/1.73 M^2
EST. GFR  (AFRICAN AMERICAN): 6.8 ML/MIN/1.73 M^2
EST. GFR  (AFRICAN AMERICAN): 6.9 ML/MIN/1.73 M^2
EST. GFR  (AFRICAN AMERICAN): 7 ML/MIN/1.73 M^2
EST. GFR  (AFRICAN AMERICAN): 7.1 ML/MIN/1.73 M^2
EST. GFR  (AFRICAN AMERICAN): 7.3 ML/MIN/1.73 M^2
EST. GFR  (AFRICAN AMERICAN): 7.4 ML/MIN/1.73 M^2
EST. GFR  (AFRICAN AMERICAN): 7.5 ML/MIN/1.73 M^2
EST. GFR  (AFRICAN AMERICAN): 7.7 ML/MIN/1.73 M^2
EST. GFR  (AFRICAN AMERICAN): 7.7 ML/MIN/1.73 M^2
EST. GFR  (AFRICAN AMERICAN): 7.8 ML/MIN/1.73 M^2
EST. GFR  (AFRICAN AMERICAN): 8 ML/MIN/1.73 M^2
EST. GFR  (AFRICAN AMERICAN): 8.1 ML/MIN/1.73 M^2
EST. GFR  (AFRICAN AMERICAN): 8.4 ML/MIN/1.73 M^2
EST. GFR  (AFRICAN AMERICAN): 8.4 ML/MIN/1.73 M^2
EST. GFR  (AFRICAN AMERICAN): 8.6 ML/MIN/1.73 M^2
EST. GFR  (AFRICAN AMERICAN): 8.8 ML/MIN/1.73 M^2
EST. GFR  (AFRICAN AMERICAN): 8.8 ML/MIN/1.73 M^2
EST. GFR  (AFRICAN AMERICAN): 8.9 ML/MIN/1.73 M^2
EST. GFR  (AFRICAN AMERICAN): 9.1 ML/MIN/1.73 M^2
EST. GFR  (AFRICAN AMERICAN): 9.5 ML/MIN/1.73 M^2
EST. GFR  (AFRICAN AMERICAN): 9.5 ML/MIN/1.73 M^2
EST. GFR  (AFRICAN AMERICAN): 9.7 ML/MIN/1.73 M^2
EST. GFR  (AFRICAN AMERICAN): 9.7 ML/MIN/1.73 M^2
EST. GFR  (NON AFRICAN AMERICAN): 10 ML/MIN/1.73 M^2
EST. GFR  (NON AFRICAN AMERICAN): 10.2 ML/MIN/1.73 M^2
EST. GFR  (NON AFRICAN AMERICAN): 10.5 ML/MIN/1.73 M^2
EST. GFR  (NON AFRICAN AMERICAN): 10.5 ML/MIN/1.73 M^2
EST. GFR  (NON AFRICAN AMERICAN): 10.8 ML/MIN/1.73 M^2
EST. GFR  (NON AFRICAN AMERICAN): 11.1 ML/MIN/1.73 M^2
EST. GFR  (NON AFRICAN AMERICAN): 11.1 ML/MIN/1.73 M^2
EST. GFR  (NON AFRICAN AMERICAN): 13.2 ML/MIN/1.73 M^2
EST. GFR  (NON AFRICAN AMERICAN): 13.6 ML/MIN/1.73 M^2
EST. GFR  (NON AFRICAN AMERICAN): 14.1 ML/MIN/1.73 M^2
EST. GFR  (NON AFRICAN AMERICAN): 14.1 ML/MIN/1.73 M^2
EST. GFR  (NON AFRICAN AMERICAN): 14.6 ML/MIN/1.73 M^2
EST. GFR  (NON AFRICAN AMERICAN): 15.1 ML/MIN/1.73 M^2
EST. GFR  (NON AFRICAN AMERICAN): 15.1 ML/MIN/1.73 M^2
EST. GFR  (NON AFRICAN AMERICAN): 16.9 ML/MIN/1.73 M^2
EST. GFR  (NON AFRICAN AMERICAN): 16.9 ML/MIN/1.73 M^2
EST. GFR  (NON AFRICAN AMERICAN): 19.1 ML/MIN/1.73 M^2
EST. GFR  (NON AFRICAN AMERICAN): 21.9 ML/MIN/1.73 M^2
EST. GFR  (NON AFRICAN AMERICAN): 24.2 ML/MIN/1.73 M^2
EST. GFR  (NON AFRICAN AMERICAN): 24.2 ML/MIN/1.73 M^2
EST. GFR  (NON AFRICAN AMERICAN): 3 ML/MIN/1.73 M^2
EST. GFR  (NON AFRICAN AMERICAN): 3.8 ML/MIN/1.73 M^2
EST. GFR  (NON AFRICAN AMERICAN): 3.8 ML/MIN/1.73 M^2
EST. GFR  (NON AFRICAN AMERICAN): 30.5 ML/MIN/1.73 M^2
EST. GFR  (NON AFRICAN AMERICAN): 32.6 ML/MIN/1.73 M^2
EST. GFR  (NON AFRICAN AMERICAN): 34.9 ML/MIN/1.73 M^2
EST. GFR  (NON AFRICAN AMERICAN): 4 ML/MIN/1.73 M^2
EST. GFR  (NON AFRICAN AMERICAN): 4.1 ML/MIN/1.73 M^2
EST. GFR  (NON AFRICAN AMERICAN): 4.6 ML/MIN/1.73 M^2
EST. GFR  (NON AFRICAN AMERICAN): 4.6 ML/MIN/1.73 M^2
EST. GFR  (NON AFRICAN AMERICAN): 4.7 ML/MIN/1.73 M^2
EST. GFR  (NON AFRICAN AMERICAN): 4.8 ML/MIN/1.73 M^2
EST. GFR  (NON AFRICAN AMERICAN): 4.9 ML/MIN/1.73 M^2
EST. GFR  (NON AFRICAN AMERICAN): 4.9 ML/MIN/1.73 M^2
EST. GFR  (NON AFRICAN AMERICAN): 5 ML/MIN/1.73 M^2
EST. GFR  (NON AFRICAN AMERICAN): 5.1 ML/MIN/1.73 M^2
EST. GFR  (NON AFRICAN AMERICAN): 5.4 ML/MIN/1.73 M^2
EST. GFR  (NON AFRICAN AMERICAN): 5.5 ML/MIN/1.73 M^2
EST. GFR  (NON AFRICAN AMERICAN): 5.5 ML/MIN/1.73 M^2
EST. GFR  (NON AFRICAN AMERICAN): 5.9 ML/MIN/1.73 M^2
EST. GFR  (NON AFRICAN AMERICAN): 5.9 ML/MIN/1.73 M^2
EST. GFR  (NON AFRICAN AMERICAN): 6 ML/MIN/1.73 M^2
EST. GFR  (NON AFRICAN AMERICAN): 6.1 ML/MIN/1.73 M^2
EST. GFR  (NON AFRICAN AMERICAN): 6.2 ML/MIN/1.73 M^2
EST. GFR  (NON AFRICAN AMERICAN): 6.3 ML/MIN/1.73 M^2
EST. GFR  (NON AFRICAN AMERICAN): 6.4 ML/MIN/1.73 M^2
EST. GFR  (NON AFRICAN AMERICAN): 6.5 ML/MIN/1.73 M^2
EST. GFR  (NON AFRICAN AMERICAN): 6.7 ML/MIN/1.73 M^2
EST. GFR  (NON AFRICAN AMERICAN): 6.7 ML/MIN/1.73 M^2
EST. GFR  (NON AFRICAN AMERICAN): 6.8 ML/MIN/1.73 M^2
EST. GFR  (NON AFRICAN AMERICAN): 7 ML/MIN/1.73 M^2
EST. GFR  (NON AFRICAN AMERICAN): 7 ML/MIN/1.73 M^2
EST. GFR  (NON AFRICAN AMERICAN): 7.3 ML/MIN/1.73 M^2
EST. GFR  (NON AFRICAN AMERICAN): 7.3 ML/MIN/1.73 M^2
EST. GFR  (NON AFRICAN AMERICAN): 7.4 ML/MIN/1.73 M^2
EST. GFR  (NON AFRICAN AMERICAN): 7.6 ML/MIN/1.73 M^2
EST. GFR  (NON AFRICAN AMERICAN): 7.6 ML/MIN/1.73 M^2
EST. GFR  (NON AFRICAN AMERICAN): 7.8 ML/MIN/1.73 M^2
EST. GFR  (NON AFRICAN AMERICAN): 7.9 ML/MIN/1.73 M^2
EST. GFR  (NON AFRICAN AMERICAN): 8.3 ML/MIN/1.73 M^2
EST. GFR  (NON AFRICAN AMERICAN): 8.3 ML/MIN/1.73 M^2
EST. GFR  (NON AFRICAN AMERICAN): 8.4 ML/MIN/1.73 M^2
EST. GFR  (NON AFRICAN AMERICAN): 8.4 ML/MIN/1.73 M^2
EST. GFR  (NON AFRICAN AMERICAN): 8.6 ML/MIN/1.73 M^2
EST. GFR  (NON AFRICAN AMERICAN): 8.8 ML/MIN/1.73 M^2
EST. GFR  (NON AFRICAN AMERICAN): 9 ML/MIN/1.73 M^2
EST. GFR  (NON AFRICAN AMERICAN): 9.3 ML/MIN/1.73 M^2
EST. GFR  (NON AFRICAN AMERICAN): 9.5 ML/MIN/1.73 M^2
EST. GFR  (NON AFRICAN AMERICAN): 9.7 ML/MIN/1.73 M^2
ESTIMATED AVG GLUCOSE: 105 MG/DL (ref 68–131)
ESTIMATED AVG GLUCOSE: 111 MG/DL (ref 68–131)
ESTIMATED AVG GLUCOSE: 117 MG/DL (ref 68–131)
ESTIMATED AVG GLUCOSE: 128 MG/DL (ref 68–131)
FERRITIN SERPL-MCNC: 5133 NG/ML (ref 20–300)
FERRITIN SERPL-MCNC: 833 NG/ML
FIBRINOGEN PPP-MCNC: 657 MG/DL
FIO2: 100
FIO2: 30
FIO2: 40
FIO2: 50
FIO2: 60
FIO2: 60
FLOW: 15
FOLATE SERPL-MCNC: 11.9 NG/ML (ref 4–24)
FRACTIONAL SHORTENING: 23 % (ref 28–44)
FRACTIONAL SHORTENING: 29 % (ref 28–44)
FRACTIONAL SHORTENING: 29 % (ref 28–44)
FRACTIONAL SHORTENING: 5 % (ref 28–44)
FUNGUS SPEC CULT: NORMAL
FUNGUS SPEC CULT: NORMAL
GIANT PLATELETS BLD QL SMEAR: PRESENT
GLUCOSE SERPL-MCNC: 101 MG/DL
GLUCOSE SERPL-MCNC: 102 MG/DL
GLUCOSE SERPL-MCNC: 103 MG/DL
GLUCOSE SERPL-MCNC: 103 MG/DL (ref 70–110)
GLUCOSE SERPL-MCNC: 107 MG/DL (ref 70–110)
GLUCOSE SERPL-MCNC: 108 MG/DL (ref 70–110)
GLUCOSE SERPL-MCNC: 111 MG/DL (ref 70–110)
GLUCOSE SERPL-MCNC: 111 MG/DL (ref 70–110)
GLUCOSE SERPL-MCNC: 113 MG/DL (ref 70–110)
GLUCOSE SERPL-MCNC: 114 MG/DL (ref 70–110)
GLUCOSE SERPL-MCNC: 114 MG/DL (ref 70–110)
GLUCOSE SERPL-MCNC: 116 MG/DL (ref 70–110)
GLUCOSE SERPL-MCNC: 116 MG/DL (ref 70–110)
GLUCOSE SERPL-MCNC: 117 MG/DL (ref 70–110)
GLUCOSE SERPL-MCNC: 117 MG/DL (ref 70–110)
GLUCOSE SERPL-MCNC: 118 MG/DL
GLUCOSE SERPL-MCNC: 119 MG/DL
GLUCOSE SERPL-MCNC: 120 MG/DL (ref 70–110)
GLUCOSE SERPL-MCNC: 120 MG/DL (ref 70–110)
GLUCOSE SERPL-MCNC: 121 MG/DL (ref 70–110)
GLUCOSE SERPL-MCNC: 121 MG/DL (ref 70–110)
GLUCOSE SERPL-MCNC: 122 MG/DL
GLUCOSE SERPL-MCNC: 122 MG/DL
GLUCOSE SERPL-MCNC: 123 MG/DL
GLUCOSE SERPL-MCNC: 124 MG/DL (ref 70–110)
GLUCOSE SERPL-MCNC: 125 MG/DL (ref 70–110)
GLUCOSE SERPL-MCNC: 125 MG/DL (ref 70–110)
GLUCOSE SERPL-MCNC: 126 MG/DL (ref 70–110)
GLUCOSE SERPL-MCNC: 126 MG/DL (ref 70–110)
GLUCOSE SERPL-MCNC: 128 MG/DL (ref 70–110)
GLUCOSE SERPL-MCNC: 129 MG/DL (ref 70–110)
GLUCOSE SERPL-MCNC: 130 MG/DL (ref 70–110)
GLUCOSE SERPL-MCNC: 131 MG/DL
GLUCOSE SERPL-MCNC: 131 MG/DL (ref 70–110)
GLUCOSE SERPL-MCNC: 131 MG/DL (ref 70–110)
GLUCOSE SERPL-MCNC: 132 MG/DL (ref 70–110)
GLUCOSE SERPL-MCNC: 133 MG/DL (ref 70–110)
GLUCOSE SERPL-MCNC: 134 MG/DL
GLUCOSE SERPL-MCNC: 134 MG/DL (ref 70–110)
GLUCOSE SERPL-MCNC: 135 MG/DL
GLUCOSE SERPL-MCNC: 136 MG/DL (ref 70–110)
GLUCOSE SERPL-MCNC: 136 MG/DL (ref 70–110)
GLUCOSE SERPL-MCNC: 137 MG/DL (ref 70–110)
GLUCOSE SERPL-MCNC: 139 MG/DL (ref 70–110)
GLUCOSE SERPL-MCNC: 140 MG/DL (ref 70–110)
GLUCOSE SERPL-MCNC: 140 MG/DL (ref 70–110)
GLUCOSE SERPL-MCNC: 141 MG/DL
GLUCOSE SERPL-MCNC: 141 MG/DL (ref 70–110)
GLUCOSE SERPL-MCNC: 142 MG/DL (ref 70–110)
GLUCOSE SERPL-MCNC: 142 MG/DL (ref 70–110)
GLUCOSE SERPL-MCNC: 143 MG/DL
GLUCOSE SERPL-MCNC: 143 MG/DL
GLUCOSE SERPL-MCNC: 143 MG/DL (ref 70–110)
GLUCOSE SERPL-MCNC: 144 MG/DL (ref 70–110)
GLUCOSE SERPL-MCNC: 144 MG/DL (ref 70–110)
GLUCOSE SERPL-MCNC: 145 MG/DL (ref 70–110)
GLUCOSE SERPL-MCNC: 146 MG/DL (ref 70–110)
GLUCOSE SERPL-MCNC: 147 MG/DL
GLUCOSE SERPL-MCNC: 147 MG/DL
GLUCOSE SERPL-MCNC: 148 MG/DL (ref 70–110)
GLUCOSE SERPL-MCNC: 149 MG/DL (ref 70–110)
GLUCOSE SERPL-MCNC: 150 MG/DL (ref 70–110)
GLUCOSE SERPL-MCNC: 151 MG/DL (ref 70–110)
GLUCOSE SERPL-MCNC: 152 MG/DL (ref 70–110)
GLUCOSE SERPL-MCNC: 152 MG/DL (ref 70–110)
GLUCOSE SERPL-MCNC: 153 MG/DL
GLUCOSE SERPL-MCNC: 153 MG/DL
GLUCOSE SERPL-MCNC: 153 MG/DL (ref 70–110)
GLUCOSE SERPL-MCNC: 155 MG/DL (ref 70–110)
GLUCOSE SERPL-MCNC: 157 MG/DL
GLUCOSE SERPL-MCNC: 157 MG/DL
GLUCOSE SERPL-MCNC: 157 MG/DL (ref 70–110)
GLUCOSE SERPL-MCNC: 158 MG/DL
GLUCOSE SERPL-MCNC: 159 MG/DL (ref 70–110)
GLUCOSE SERPL-MCNC: 160 MG/DL
GLUCOSE SERPL-MCNC: 160 MG/DL (ref 70–110)
GLUCOSE SERPL-MCNC: 160 MG/DL (ref 70–110)
GLUCOSE SERPL-MCNC: 163 MG/DL (ref 70–110)
GLUCOSE SERPL-MCNC: 165 MG/DL (ref 70–110)
GLUCOSE SERPL-MCNC: 169 MG/DL
GLUCOSE SERPL-MCNC: 170 MG/DL
GLUCOSE SERPL-MCNC: 174 MG/DL (ref 70–110)
GLUCOSE SERPL-MCNC: 181 MG/DL (ref 70–110)
GLUCOSE SERPL-MCNC: 182 MG/DL
GLUCOSE SERPL-MCNC: 184 MG/DL (ref 70–110)
GLUCOSE SERPL-MCNC: 188 MG/DL (ref 70–110)
GLUCOSE SERPL-MCNC: 190 MG/DL
GLUCOSE SERPL-MCNC: 192 MG/DL (ref 70–110)
GLUCOSE SERPL-MCNC: 193 MG/DL
GLUCOSE SERPL-MCNC: 193 MG/DL (ref 70–110)
GLUCOSE SERPL-MCNC: 194 MG/DL
GLUCOSE SERPL-MCNC: 197 MG/DL
GLUCOSE SERPL-MCNC: 204 MG/DL
GLUCOSE SERPL-MCNC: 205 MG/DL
GLUCOSE SERPL-MCNC: 208 MG/DL
GLUCOSE SERPL-MCNC: 209 MG/DL
GLUCOSE SERPL-MCNC: 209 MG/DL (ref 70–110)
GLUCOSE SERPL-MCNC: 210 MG/DL
GLUCOSE SERPL-MCNC: 215 MG/DL (ref 70–110)
GLUCOSE SERPL-MCNC: 218 MG/DL (ref 70–110)
GLUCOSE SERPL-MCNC: 221 MG/DL
GLUCOSE SERPL-MCNC: 224 MG/DL
GLUCOSE SERPL-MCNC: 228 MG/DL
GLUCOSE SERPL-MCNC: 228 MG/DL
GLUCOSE SERPL-MCNC: 228 MG/DL (ref 70–110)
GLUCOSE SERPL-MCNC: 230 MG/DL
GLUCOSE SERPL-MCNC: 232 MG/DL
GLUCOSE SERPL-MCNC: 238 MG/DL
GLUCOSE SERPL-MCNC: 242 MG/DL
GLUCOSE SERPL-MCNC: 244 MG/DL
GLUCOSE SERPL-MCNC: 266 MG/DL
GLUCOSE SERPL-MCNC: 293 MG/DL
GLUCOSE SERPL-MCNC: 47 MG/DL (ref 70–110)
GLUCOSE SERPL-MCNC: 47 MG/DL (ref 70–110)
GLUCOSE SERPL-MCNC: 85 MG/DL
GLUCOSE SERPL-MCNC: 87 MG/DL (ref 70–110)
GLUCOSE SERPL-MCNC: 89 MG/DL (ref 70–110)
GLUCOSE SERPL-MCNC: 91 MG/DL
GLUCOSE SERPL-MCNC: 91 MG/DL (ref 70–110)
GLUCOSE SERPL-MCNC: 96 MG/DL
GLUCOSE SERPL-MCNC: 96 MG/DL
GLUCOSE SERPL-MCNC: 96 MG/DL (ref 70–110)
GLUCOSE SERPL-MCNC: 96 MG/DL (ref 70–110)
GLUCOSE SERPL-MCNC: 97 MG/DL (ref 70–110)
GRAM STN SPEC: NORMAL
HAPTOGLOB SERPL-MCNC: 244 MG/DL (ref 30–250)
HAPTOGLOB SERPL-MCNC: 646 MG/DL
HAV IGM SERPL QL IA: NEGATIVE
HAV IGM SERPL QL IA: NEGATIVE
HBA1C MFR BLD HPLC: 5.3 % (ref 4–5.6)
HBA1C MFR BLD HPLC: 5.5 % (ref 4–5.6)
HBA1C MFR BLD HPLC: 5.7 % (ref 4–5.6)
HBA1C MFR BLD HPLC: 6.1 % (ref 4–5.6)
HBV CORE IGM SERPL QL IA: NEGATIVE
HBV CORE IGM SERPL QL IA: NEGATIVE
HBV SURFACE AB SER QL IA: NEGATIVE
HBV SURFACE AB SERPL IA-ACNC: 5 MIU/ML
HBV SURFACE AG SERPL QL IA: NEGATIVE
HCO3 UR-SCNC: 12 MMOL/L (ref 24–28)
HCO3 UR-SCNC: 14.4 MMOL/L (ref 24–28)
HCO3 UR-SCNC: 14.5 MMOL/L (ref 24–28)
HCO3 UR-SCNC: 15.1 MMOL/L (ref 24–28)
HCO3 UR-SCNC: 15.2 MMOL/L (ref 24–28)
HCO3 UR-SCNC: 16.3 MMOL/L (ref 24–28)
HCO3 UR-SCNC: 17 MMOL/L (ref 24–28)
HCO3 UR-SCNC: 17.1 MMOL/L (ref 24–28)
HCO3 UR-SCNC: 19.5 MMOL/L (ref 24–28)
HCO3 UR-SCNC: 22 MMOL/L (ref 24–28)
HCO3 UR-SCNC: 22.7 MMOL/L (ref 24–28)
HCO3 UR-SCNC: 22.9 MMOL/L (ref 24–28)
HCO3 UR-SCNC: 23.2 MMOL/L (ref 24–28)
HCO3 UR-SCNC: 25.2 MMOL/L (ref 24–28)
HCT VFR BLD AUTO: 19.9 %
HCT VFR BLD AUTO: 20 %
HCT VFR BLD AUTO: 20.1 %
HCT VFR BLD AUTO: 21.4 %
HCT VFR BLD AUTO: 21.9 % (ref 37–48.5)
HCT VFR BLD AUTO: 22 % (ref 37–48.5)
HCT VFR BLD AUTO: 22.7 % (ref 37–48.5)
HCT VFR BLD AUTO: 22.9 % (ref 37–48.5)
HCT VFR BLD AUTO: 23 %
HCT VFR BLD AUTO: 23 %
HCT VFR BLD AUTO: 23.2 % (ref 37–48.5)
HCT VFR BLD AUTO: 23.3 % (ref 37–48.5)
HCT VFR BLD AUTO: 23.4 % (ref 37–48.5)
HCT VFR BLD AUTO: 23.6 % (ref 37–48.5)
HCT VFR BLD AUTO: 23.7 % (ref 37–48.5)
HCT VFR BLD AUTO: 23.8 %
HCT VFR BLD AUTO: 24.6 % (ref 37–48.5)
HCT VFR BLD AUTO: 24.8 % (ref 37–48.5)
HCT VFR BLD AUTO: 24.9 % (ref 37–48.5)
HCT VFR BLD AUTO: 25 % (ref 37–48.5)
HCT VFR BLD AUTO: 25 % (ref 37–48.5)
HCT VFR BLD AUTO: 25.1 % (ref 37–48.5)
HCT VFR BLD AUTO: 25.2 % (ref 37–48.5)
HCT VFR BLD AUTO: 25.3 % (ref 37–48.5)
HCT VFR BLD AUTO: 25.3 % (ref 37–48.5)
HCT VFR BLD AUTO: 25.4 % (ref 37–48.5)
HCT VFR BLD AUTO: 25.5 %
HCT VFR BLD AUTO: 25.5 %
HCT VFR BLD AUTO: 25.5 % (ref 37–48.5)
HCT VFR BLD AUTO: 25.8 %
HCT VFR BLD AUTO: 25.8 % (ref 37–48.5)
HCT VFR BLD AUTO: 25.9 % (ref 37–48.5)
HCT VFR BLD AUTO: 25.9 % (ref 37–48.5)
HCT VFR BLD AUTO: 26.2 % (ref 37–48.5)
HCT VFR BLD AUTO: 26.3 % (ref 37–48.5)
HCT VFR BLD AUTO: 26.5 %
HCT VFR BLD AUTO: 26.7 %
HCT VFR BLD AUTO: 26.7 % (ref 37–48.5)
HCT VFR BLD AUTO: 26.7 % (ref 37–48.5)
HCT VFR BLD AUTO: 26.8 %
HCT VFR BLD AUTO: 27 % (ref 37–48.5)
HCT VFR BLD AUTO: 27 % (ref 37–48.5)
HCT VFR BLD AUTO: 27.1 % (ref 37–48.5)
HCT VFR BLD AUTO: 27.2 %
HCT VFR BLD AUTO: 27.2 %
HCT VFR BLD AUTO: 27.2 % (ref 37–48.5)
HCT VFR BLD AUTO: 27.3 %
HCT VFR BLD AUTO: 27.3 %
HCT VFR BLD AUTO: 27.3 % (ref 37–48.5)
HCT VFR BLD AUTO: 27.4 % (ref 37–48.5)
HCT VFR BLD AUTO: 27.6 % (ref 37–48.5)
HCT VFR BLD AUTO: 27.7 % (ref 37–48.5)
HCT VFR BLD AUTO: 27.7 % (ref 37–48.5)
HCT VFR BLD AUTO: 27.8 %
HCT VFR BLD AUTO: 27.9 %
HCT VFR BLD AUTO: 27.9 % (ref 37–48.5)
HCT VFR BLD AUTO: 28 % (ref 37–48.5)
HCT VFR BLD AUTO: 28.1 % (ref 37–48.5)
HCT VFR BLD AUTO: 28.3 %
HCT VFR BLD AUTO: 28.3 % (ref 37–48.5)
HCT VFR BLD AUTO: 28.4 %
HCT VFR BLD AUTO: 28.5 %
HCT VFR BLD AUTO: 28.5 %
HCT VFR BLD AUTO: 28.6 % (ref 37–48.5)
HCT VFR BLD AUTO: 28.7 % (ref 37–48.5)
HCT VFR BLD AUTO: 28.7 % (ref 37–48.5)
HCT VFR BLD AUTO: 28.9 % (ref 37–48.5)
HCT VFR BLD AUTO: 29 % (ref 37–48.5)
HCT VFR BLD AUTO: 29 % (ref 37–48.5)
HCT VFR BLD AUTO: 29.1 %
HCT VFR BLD AUTO: 29.1 %
HCT VFR BLD AUTO: 29.1 % (ref 37–48.5)
HCT VFR BLD AUTO: 29.2 %
HCT VFR BLD AUTO: 29.3 % (ref 37–48.5)
HCT VFR BLD AUTO: 29.5 % (ref 37–48.5)
HCT VFR BLD AUTO: 29.5 % (ref 37–48.5)
HCT VFR BLD AUTO: 29.6 % (ref 37–48.5)
HCT VFR BLD AUTO: 29.6 % (ref 37–48.5)
HCT VFR BLD AUTO: 29.7 %
HCT VFR BLD AUTO: 29.8 %
HCT VFR BLD AUTO: 29.8 % (ref 37–48.5)
HCT VFR BLD AUTO: 29.8 % (ref 37–48.5)
HCT VFR BLD AUTO: 30.1 % (ref 37–48.5)
HCT VFR BLD AUTO: 30.1 % (ref 37–48.5)
HCT VFR BLD AUTO: 30.3 % (ref 37–48.5)
HCT VFR BLD AUTO: 30.4 % (ref 37–48.5)
HCT VFR BLD AUTO: 30.5 % (ref 37–48.5)
HCT VFR BLD AUTO: 30.6 % (ref 37–48.5)
HCT VFR BLD AUTO: 30.6 % (ref 37–48.5)
HCT VFR BLD AUTO: 31 % (ref 37–48.5)
HCT VFR BLD AUTO: 31.1 %
HCT VFR BLD AUTO: 31.2 % (ref 37–48.5)
HCT VFR BLD AUTO: 31.4 % (ref 37–48.5)
HCT VFR BLD AUTO: 31.6 % (ref 37–48.5)
HCT VFR BLD AUTO: 31.7 %
HCT VFR BLD AUTO: 32.2 %
HCT VFR BLD AUTO: 32.2 % (ref 37–48.5)
HCT VFR BLD AUTO: 32.3 %
HCT VFR BLD AUTO: 32.3 % (ref 37–48.5)
HCT VFR BLD AUTO: 32.3 % (ref 37–48.5)
HCT VFR BLD AUTO: 32.4 %
HCT VFR BLD AUTO: 32.4 %
HCT VFR BLD AUTO: 32.5 % (ref 37–48.5)
HCT VFR BLD AUTO: 32.7 % (ref 37–48.5)
HCT VFR BLD AUTO: 33 %
HCT VFR BLD AUTO: 33.3 % (ref 37–48.5)
HCT VFR BLD AUTO: 33.7 % (ref 37–48.5)
HCT VFR BLD AUTO: 34.3 % (ref 37–48.5)
HCT VFR BLD AUTO: 34.5 %
HCT VFR BLD AUTO: 36 %
HCT VFR BLD AUTO: 36.2 %
HCT VFR BLD AUTO: 36.3 % (ref 37–48.5)
HCT VFR BLD AUTO: 36.9 %
HCT VFR BLD AUTO: 37.4 %
HCT VFR BLD AUTO: 39.7 %
HCT VFR BLD CALC: 22 %PCV (ref 36–54)
HCT VFR BLD CALC: 26 %PCV (ref 36–54)
HCT VFR BLD CALC: 29 %PCV (ref 36–54)
HCT VFR BLD CALC: 30 %PCV (ref 36–54)
HCV AB SERPL QL IA: NEGATIVE
HCV AB SERPL QL IA: NEGATIVE
HGB BLD-MCNC: 10 G/DL (ref 12–16)
HGB BLD-MCNC: 10.1 G/DL
HGB BLD-MCNC: 10.1 G/DL (ref 12–16)
HGB BLD-MCNC: 10.1 G/DL (ref 12–16)
HGB BLD-MCNC: 10.2 G/DL
HGB BLD-MCNC: 10.2 G/DL
HGB BLD-MCNC: 10.3 G/DL
HGB BLD-MCNC: 10.4 G/DL
HGB BLD-MCNC: 10.4 G/DL (ref 12–16)
HGB BLD-MCNC: 10.4 G/DL (ref 12–16)
HGB BLD-MCNC: 10.7 G/DL
HGB BLD-MCNC: 11.1 G/DL
HGB BLD-MCNC: 11.1 G/DL
HGB BLD-MCNC: 11.2 G/DL
HGB BLD-MCNC: 11.2 G/DL (ref 12–16)
HGB BLD-MCNC: 11.7 G/DL
HGB BLD-MCNC: 11.8 G/DL
HGB BLD-MCNC: 12.2 G/DL
HGB BLD-MCNC: 6 G/DL
HGB BLD-MCNC: 6 G/DL
HGB BLD-MCNC: 6.3 G/DL
HGB BLD-MCNC: 6.8 G/DL (ref 12–16)
HGB BLD-MCNC: 6.9 G/DL
HGB BLD-MCNC: 6.9 G/DL (ref 12–16)
HGB BLD-MCNC: 6.9 G/DL (ref 12–16)
HGB BLD-MCNC: 7 G/DL (ref 12–16)
HGB BLD-MCNC: 7.1 G/DL (ref 12–16)
HGB BLD-MCNC: 7.2 G/DL (ref 12–16)
HGB BLD-MCNC: 7.3 G/DL
HGB BLD-MCNC: 7.3 G/DL
HGB BLD-MCNC: 7.4 G/DL
HGB BLD-MCNC: 7.5 G/DL (ref 12–16)
HGB BLD-MCNC: 7.6 G/DL (ref 12–16)
HGB BLD-MCNC: 7.7 G/DL (ref 12–16)
HGB BLD-MCNC: 7.7 G/DL (ref 12–16)
HGB BLD-MCNC: 7.8 G/DL (ref 12–16)
HGB BLD-MCNC: 7.9 G/DL (ref 12–16)
HGB BLD-MCNC: 7.9 G/DL (ref 12–16)
HGB BLD-MCNC: 8 G/DL (ref 12–16)
HGB BLD-MCNC: 8.1 G/DL
HGB BLD-MCNC: 8.1 G/DL
HGB BLD-MCNC: 8.2 G/DL
HGB BLD-MCNC: 8.2 G/DL (ref 12–16)
HGB BLD-MCNC: 8.3 G/DL
HGB BLD-MCNC: 8.3 G/DL
HGB BLD-MCNC: 8.3 G/DL (ref 12–16)
HGB BLD-MCNC: 8.4 G/DL
HGB BLD-MCNC: 8.4 G/DL (ref 12–16)
HGB BLD-MCNC: 8.4 G/DL (ref 12–16)
HGB BLD-MCNC: 8.5 G/DL
HGB BLD-MCNC: 8.5 G/DL (ref 12–16)
HGB BLD-MCNC: 8.5 G/DL (ref 12–16)
HGB BLD-MCNC: 8.6 G/DL
HGB BLD-MCNC: 8.6 G/DL (ref 12–16)
HGB BLD-MCNC: 8.7 G/DL
HGB BLD-MCNC: 8.7 G/DL
HGB BLD-MCNC: 8.7 G/DL (ref 12–16)
HGB BLD-MCNC: 8.8 G/DL (ref 12–16)
HGB BLD-MCNC: 8.9 G/DL
HGB BLD-MCNC: 8.9 G/DL
HGB BLD-MCNC: 8.9 G/DL (ref 12–16)
HGB BLD-MCNC: 9 G/DL
HGB BLD-MCNC: 9 G/DL (ref 12–16)
HGB BLD-MCNC: 9.1 G/DL (ref 12–16)
HGB BLD-MCNC: 9.2 G/DL
HGB BLD-MCNC: 9.2 G/DL (ref 12–16)
HGB BLD-MCNC: 9.3 G/DL (ref 12–16)
HGB BLD-MCNC: 9.5 G/DL (ref 12–16)
HGB BLD-MCNC: 9.6 G/DL (ref 12–16)
HGB BLD-MCNC: 9.7 G/DL
HGB BLD-MCNC: 9.7 G/DL (ref 12–16)
HGB BLD-MCNC: 9.7 G/DL (ref 12–16)
HGB BLD-MCNC: 9.9 G/DL
HOWELL-JOLLY BOD BLD QL SMEAR: ABNORMAL
HOWELL-JOLLY BOD BLD QL SMEAR: ABNORMAL
HYPOCHROMIA BLD QL SMEAR: ABNORMAL
IMM GRANULOCYTES # BLD AUTO: 0.02 K/UL (ref 0–0.04)
IMM GRANULOCYTES # BLD AUTO: 0.03 K/UL (ref 0–0.04)
IMM GRANULOCYTES # BLD AUTO: 0.04 K/UL (ref 0–0.04)
IMM GRANULOCYTES # BLD AUTO: 0.05 K/UL (ref 0–0.04)
IMM GRANULOCYTES # BLD AUTO: 0.06 K/UL
IMM GRANULOCYTES # BLD AUTO: 0.06 K/UL (ref 0–0.04)
IMM GRANULOCYTES # BLD AUTO: 0.07 K/UL
IMM GRANULOCYTES # BLD AUTO: 0.07 K/UL (ref 0–0.04)
IMM GRANULOCYTES # BLD AUTO: 0.07 K/UL (ref 0–0.04)
IMM GRANULOCYTES # BLD AUTO: 0.08 K/UL
IMM GRANULOCYTES # BLD AUTO: 0.08 K/UL
IMM GRANULOCYTES # BLD AUTO: 0.08 K/UL (ref 0–0.04)
IMM GRANULOCYTES # BLD AUTO: 0.09 K/UL
IMM GRANULOCYTES # BLD AUTO: 0.09 K/UL
IMM GRANULOCYTES # BLD AUTO: 0.09 K/UL (ref 0–0.04)
IMM GRANULOCYTES # BLD AUTO: 0.1 K/UL
IMM GRANULOCYTES # BLD AUTO: 0.11 K/UL (ref 0–0.04)
IMM GRANULOCYTES # BLD AUTO: 0.13 K/UL (ref 0–0.04)
IMM GRANULOCYTES # BLD AUTO: 0.15 K/UL (ref 0–0.04)
IMM GRANULOCYTES # BLD AUTO: 0.16 K/UL (ref 0–0.04)
IMM GRANULOCYTES # BLD AUTO: 0.17 K/UL (ref 0–0.04)
IMM GRANULOCYTES # BLD AUTO: 0.19 K/UL (ref 0–0.04)
IMM GRANULOCYTES # BLD AUTO: 0.19 K/UL (ref 0–0.04)
IMM GRANULOCYTES # BLD AUTO: 0.2 K/UL (ref 0–0.04)
IMM GRANULOCYTES # BLD AUTO: 0.2 K/UL (ref 0–0.04)
IMM GRANULOCYTES # BLD AUTO: 0.22 K/UL (ref 0–0.04)
IMM GRANULOCYTES # BLD AUTO: 0.23 K/UL (ref 0–0.04)
IMM GRANULOCYTES # BLD AUTO: 0.28 K/UL (ref 0–0.04)
IMM GRANULOCYTES # BLD AUTO: 0.44 K/UL (ref 0–0.04)
IMM GRANULOCYTES # BLD AUTO: 0.65 K/UL (ref 0–0.04)
IMM GRANULOCYTES # BLD AUTO: ABNORMAL K/UL (ref 0–0.04)
IMM GRANULOCYTES # BLD AUTO: ABNORMAL K/UL (ref 0–0.04)
IMM GRANULOCYTES NFR BLD AUTO: 0.2 % (ref 0–0.5)
IMM GRANULOCYTES NFR BLD AUTO: 0.3 % (ref 0–0.5)
IMM GRANULOCYTES NFR BLD AUTO: 0.4 % (ref 0–0.5)
IMM GRANULOCYTES NFR BLD AUTO: 0.5 % (ref 0–0.5)
IMM GRANULOCYTES NFR BLD AUTO: 0.6 % (ref 0–0.5)
IMM GRANULOCYTES NFR BLD AUTO: 0.7 %
IMM GRANULOCYTES NFR BLD AUTO: 0.7 % (ref 0–0.5)
IMM GRANULOCYTES NFR BLD AUTO: 0.8 %
IMM GRANULOCYTES NFR BLD AUTO: 0.8 % (ref 0–0.5)
IMM GRANULOCYTES NFR BLD AUTO: 0.9 %
IMM GRANULOCYTES NFR BLD AUTO: 0.9 % (ref 0–0.5)
IMM GRANULOCYTES NFR BLD AUTO: 0.9 % (ref 0–0.5)
IMM GRANULOCYTES NFR BLD AUTO: 1 %
IMM GRANULOCYTES NFR BLD AUTO: 1 % (ref 0–0.5)
IMM GRANULOCYTES NFR BLD AUTO: 1.1 %
IMM GRANULOCYTES NFR BLD AUTO: 1.2 % (ref 0–0.5)
IMM GRANULOCYTES NFR BLD AUTO: 1.3 % (ref 0–0.5)
IMM GRANULOCYTES NFR BLD AUTO: 1.3 % (ref 0–0.5)
IMM GRANULOCYTES NFR BLD AUTO: 1.5 % (ref 0–0.5)
IMM GRANULOCYTES NFR BLD AUTO: 1.5 % (ref 0–0.5)
IMM GRANULOCYTES NFR BLD AUTO: 2.1 % (ref 0–0.5)
IMM GRANULOCYTES NFR BLD AUTO: 2.8 % (ref 0–0.5)
IMM GRANULOCYTES NFR BLD AUTO: ABNORMAL % (ref 0–0.5)
IMM GRANULOCYTES NFR BLD AUTO: ABNORMAL % (ref 0–0.5)
INR PPP: 1 (ref 0.8–1.2)
INR PPP: 1.1 (ref 0.8–1.2)
INR PPP: 1.2
INR PPP: 1.2
INR PPP: 1.2 (ref 0.8–1.2)
INR PPP: 1.3
INR PPP: 1.4
INR PPP: 1.5
INR PPP: 1.6
INR PPP: 1.6
INR PPP: 1.7
INR PPP: 1.7
INR PPP: 1.8
INTERVENTRICULAR SEPTUM: 0.88 CM (ref 0.6–1.1)
INTERVENTRICULAR SEPTUM: 0.93 CM (ref 0.6–1.1)
INTERVENTRICULAR SEPTUM: 1.1 CM (ref 0.6–1.1)
INTERVENTRICULAR SEPTUM: 1.21 CM (ref 0.6–1.1)
IRON SERPL-MCNC: 20 UG/DL
IRON SERPL-MCNC: 22 UG/DL (ref 30–160)
IVRT: 0.06 MSEC
IVRT: 0.08 MSEC
IVRT: 0.11 MSEC
LA MAJOR: 4.45 CM
LA MAJOR: 5.35 CM
LA MAJOR: 5.99 CM
LA MINOR: 3.79 CM
LA MINOR: 5.28 CM
LA MINOR: 5.99 CM
LA WIDTH: 2.88 CM
LA WIDTH: 3.86 CM
LA WIDTH: 4.53 CM
LACTATE SERPL-SCNC: 0.8 MMOL/L (ref 0.5–2.2)
LACTATE SERPL-SCNC: 0.9 MMOL/L
LACTATE SERPL-SCNC: 1 MMOL/L
LACTATE SERPL-SCNC: 1 MMOL/L (ref 0.5–2.2)
LACTATE SERPL-SCNC: 1.5 MMOL/L
LACTATE SERPL-SCNC: 1.5 MMOL/L (ref 0.5–2.2)
LACTATE SERPL-SCNC: 1.5 MMOL/L (ref 0.5–2.2)
LACTATE SERPL-SCNC: 1.7 MMOL/L (ref 0.5–2.2)
LACTATE SERPL-SCNC: 1.8 MMOL/L
LACTATE SERPL-SCNC: 1.9 MMOL/L (ref 0.5–2.2)
LACTATE SERPL-SCNC: 10.8 MMOL/L (ref 0.5–2.2)
LACTATE SERPL-SCNC: 2 MMOL/L (ref 0.5–2.2)
LACTATE SERPL-SCNC: 2.1 MMOL/L
LACTATE SERPL-SCNC: 2.3 MMOL/L (ref 0.5–2.2)
LACTATE SERPL-SCNC: 2.4 MMOL/L (ref 0.5–2.2)
LACTATE SERPL-SCNC: 3 MMOL/L (ref 0.5–2.2)
LACTATE SERPL-SCNC: 4.5 MMOL/L (ref 0.5–2.2)
LACTATE SERPL-SCNC: 6.2 MMOL/L (ref 0.5–2.2)
LACTATE SERPL-SCNC: 6.5 MMOL/L (ref 0.5–2.2)
LACTATE SERPL-SCNC: 7 MMOL/L
LACTATE SERPL-SCNC: 7.9 MMOL/L (ref 0.5–2.2)
LACTATE SERPL-SCNC: >12 MMOL/L (ref 0.5–2.2)
LACTATE SERPL-SCNC: >12 MMOL/L (ref 0.5–2.2)
LDH SERPL L TO P-CCNC: 12.23 MMOL/L (ref 0.36–1.25)
LDH SERPL L TO P-CCNC: 12.69 MMOL/L (ref 0.36–1.25)
LDH SERPL L TO P-CCNC: 2.43 MMOL/L (ref 0.36–1.25)
LDH SERPL L TO P-CCNC: 212 U/L
LDH SERPL L TO P-CCNC: 353 U/L (ref 110–260)
LEFT ATRIUM SIZE: 3.94 CM
LEFT ATRIUM SIZE: 4 CM
LEFT ATRIUM SIZE: 4.33 CM
LEFT ATRIUM SIZE: 4.41 CM
LEFT ATRIUM VOLUME INDEX: 19.4 ML/M2
LEFT ATRIUM VOLUME INDEX: 34.1 ML/M2
LEFT ATRIUM VOLUME INDEX: 48 ML/M2
LEFT ATRIUM VOLUME: 39.48 CM3
LEFT ATRIUM VOLUME: 69.75 CM3
LEFT ATRIUM VOLUME: 99.87 CM3
LEFT INTERNAL DIMENSION IN SYSTOLE: 3.48 CM (ref 2.1–4)
LEFT INTERNAL DIMENSION IN SYSTOLE: 3.53 CM (ref 2.1–4)
LEFT INTERNAL DIMENSION IN SYSTOLE: 3.85 CM (ref 2.1–4)
LEFT INTERNAL DIMENSION IN SYSTOLE: 4.05 CM (ref 2.1–4)
LEFT VENTRICLE DIASTOLIC VOLUME INDEX: 35.85 ML/M2
LEFT VENTRICLE DIASTOLIC VOLUME INDEX: 52.67 ML/M2
LEFT VENTRICLE DIASTOLIC VOLUME INDEX: 53.86 ML/M2
LEFT VENTRICLE DIASTOLIC VOLUME INDEX: 62.94 ML/M2
LEFT VENTRICLE DIASTOLIC VOLUME: 107.63 ML
LEFT VENTRICLE DIASTOLIC VOLUME: 114.52 ML
LEFT VENTRICLE DIASTOLIC VOLUME: 130.98 ML
LEFT VENTRICLE DIASTOLIC VOLUME: 73.12 ML
LEFT VENTRICLE MASS INDEX: 108.8 G/M2
LEFT VENTRICLE MASS INDEX: 63 G/M2
LEFT VENTRICLE MASS INDEX: 89.1 G/M2
LEFT VENTRICLE MASS INDEX: 92.3 G/M2
LEFT VENTRICLE SYSTOLIC VOLUME INDEX: 23.5 ML/M2
LEFT VENTRICLE SYSTOLIC VOLUME INDEX: 25.4 ML/M2
LEFT VENTRICLE SYSTOLIC VOLUME INDEX: 31.3 ML/M2
LEFT VENTRICLE SYSTOLIC VOLUME INDEX: 34.7 ML/M2
LEFT VENTRICLE SYSTOLIC VOLUME: 50 ML
LEFT VENTRICLE SYSTOLIC VOLUME: 52 ML
LEFT VENTRICLE SYSTOLIC VOLUME: 63.93 ML
LEFT VENTRICLE SYSTOLIC VOLUME: 72.11 ML
LEFT VENTRICULAR INTERNAL DIMENSION IN DIASTOLE: 4.07 CM (ref 3.5–6)
LEFT VENTRICULAR INTERNAL DIMENSION IN DIASTOLE: 4.93 CM (ref 3.5–6)
LEFT VENTRICULAR INTERNAL DIMENSION IN DIASTOLE: 5 CM (ref 3.5–6)
LEFT VENTRICULAR INTERNAL DIMENSION IN DIASTOLE: 5.23 CM (ref 3.5–6)
LEFT VENTRICULAR MASS: 128.75 G
LEFT VENTRICULAR MASS: 181.98 G
LEFT VENTRICULAR MASS: 192.12 G
LEFT VENTRICULAR MASS: 231.27 G
LIPASE SERPL-CCNC: 68 U/L
LIPASE SERPL-CCNC: 68 U/L (ref 4–60)
LV LATERAL E/E' RATIO: 10.7
LV LATERAL E/E' RATIO: 12
LV LATERAL E/E' RATIO: 13.4
LV LATERAL E/E' RATIO: 15.8 M/S
LV SEPTAL E/E' RATIO: 10.7
LV SEPTAL E/E' RATIO: 11.17
LV SEPTAL E/E' RATIO: 15.8 M/S
LYMPHOCYTES # BLD AUTO: 0.7 K/UL (ref 1–4.8)
LYMPHOCYTES # BLD AUTO: 1 K/UL (ref 1–4.8)
LYMPHOCYTES # BLD AUTO: 1.2 K/UL
LYMPHOCYTES # BLD AUTO: 1.4 K/UL
LYMPHOCYTES # BLD AUTO: 1.4 K/UL (ref 1–4.8)
LYMPHOCYTES # BLD AUTO: 1.4 K/UL (ref 1–4.8)
LYMPHOCYTES # BLD AUTO: 1.5 K/UL (ref 1–4.8)
LYMPHOCYTES # BLD AUTO: 1.6 K/UL
LYMPHOCYTES # BLD AUTO: 1.7 K/UL
LYMPHOCYTES # BLD AUTO: 1.7 K/UL (ref 1–4.8)
LYMPHOCYTES # BLD AUTO: 1.7 K/UL (ref 1–4.8)
LYMPHOCYTES # BLD AUTO: 1.8 K/UL
LYMPHOCYTES # BLD AUTO: 1.9 K/UL
LYMPHOCYTES # BLD AUTO: 1.9 K/UL (ref 1–4.8)
LYMPHOCYTES # BLD AUTO: 2 K/UL (ref 1–4.8)
LYMPHOCYTES # BLD AUTO: 2.1 K/UL
LYMPHOCYTES # BLD AUTO: 2.1 K/UL (ref 1–4.8)
LYMPHOCYTES # BLD AUTO: 2.2 K/UL
LYMPHOCYTES # BLD AUTO: 2.3 K/UL
LYMPHOCYTES # BLD AUTO: 2.3 K/UL (ref 1–4.8)
LYMPHOCYTES # BLD AUTO: 2.4 K/UL
LYMPHOCYTES # BLD AUTO: 2.4 K/UL
LYMPHOCYTES # BLD AUTO: 2.4 K/UL (ref 1–4.8)
LYMPHOCYTES # BLD AUTO: 2.5 K/UL
LYMPHOCYTES # BLD AUTO: 2.5 K/UL (ref 1–4.8)
LYMPHOCYTES # BLD AUTO: 2.5 K/UL (ref 1–4.8)
LYMPHOCYTES # BLD AUTO: 2.6 K/UL
LYMPHOCYTES # BLD AUTO: 2.6 K/UL (ref 1–4.8)
LYMPHOCYTES # BLD AUTO: 2.7 K/UL
LYMPHOCYTES # BLD AUTO: 2.7 K/UL
LYMPHOCYTES # BLD AUTO: 2.7 K/UL (ref 1–4.8)
LYMPHOCYTES # BLD AUTO: 2.8 K/UL
LYMPHOCYTES # BLD AUTO: 2.8 K/UL
LYMPHOCYTES # BLD AUTO: 2.8 K/UL (ref 1–4.8)
LYMPHOCYTES # BLD AUTO: 2.9 K/UL (ref 1–4.8)
LYMPHOCYTES # BLD AUTO: 3 K/UL (ref 1–4.8)
LYMPHOCYTES # BLD AUTO: 3.1 K/UL (ref 1–4.8)
LYMPHOCYTES # BLD AUTO: 3.1 K/UL (ref 1–4.8)
LYMPHOCYTES # BLD AUTO: 3.3 K/UL (ref 1–4.8)
LYMPHOCYTES # BLD AUTO: 3.3 K/UL (ref 1–4.8)
LYMPHOCYTES # BLD AUTO: 3.4 K/UL (ref 1–4.8)
LYMPHOCYTES # BLD AUTO: 3.5 K/UL (ref 1–4.8)
LYMPHOCYTES # BLD AUTO: 3.6 K/UL (ref 1–4.8)
LYMPHOCYTES # BLD AUTO: 3.6 K/UL (ref 1–4.8)
LYMPHOCYTES # BLD AUTO: 3.7 K/UL (ref 1–4.8)
LYMPHOCYTES # BLD AUTO: 3.8 K/UL (ref 1–4.8)
LYMPHOCYTES # BLD AUTO: 4 K/UL (ref 1–4.8)
LYMPHOCYTES # BLD AUTO: 4.1 K/UL (ref 1–4.8)
LYMPHOCYTES # BLD AUTO: 4.2 K/UL (ref 1–4.8)
LYMPHOCYTES # BLD AUTO: 4.2 K/UL (ref 1–4.8)
LYMPHOCYTES # BLD AUTO: 4.5 K/UL (ref 1–4.8)
LYMPHOCYTES # BLD AUTO: 4.6 K/UL (ref 1–4.8)
LYMPHOCYTES # BLD AUTO: 4.7 K/UL (ref 1–4.8)
LYMPHOCYTES # BLD AUTO: 4.8 K/UL (ref 1–4.8)
LYMPHOCYTES # BLD AUTO: 4.8 K/UL (ref 1–4.8)
LYMPHOCYTES # BLD AUTO: 5.1 K/UL (ref 1–4.8)
LYMPHOCYTES # BLD AUTO: 5.2 K/UL (ref 1–4.8)
LYMPHOCYTES # BLD AUTO: 5.4 K/UL (ref 1–4.8)
LYMPHOCYTES # BLD AUTO: 5.4 K/UL (ref 1–4.8)
LYMPHOCYTES # BLD AUTO: 5.9 K/UL (ref 1–4.8)
LYMPHOCYTES # BLD AUTO: 5.9 K/UL (ref 1–4.8)
LYMPHOCYTES NFR BLD: 10.5 %
LYMPHOCYTES NFR BLD: 10.7 % (ref 18–48)
LYMPHOCYTES NFR BLD: 11 % (ref 18–48)
LYMPHOCYTES NFR BLD: 11.3 % (ref 18–48)
LYMPHOCYTES NFR BLD: 11.5 % (ref 18–48)
LYMPHOCYTES NFR BLD: 11.5 % (ref 18–48)
LYMPHOCYTES NFR BLD: 11.7 % (ref 18–48)
LYMPHOCYTES NFR BLD: 13.2 % (ref 18–48)
LYMPHOCYTES NFR BLD: 13.6 % (ref 18–48)
LYMPHOCYTES NFR BLD: 13.9 % (ref 18–48)
LYMPHOCYTES NFR BLD: 14.4 %
LYMPHOCYTES NFR BLD: 14.5 % (ref 18–48)
LYMPHOCYTES NFR BLD: 14.6 %
LYMPHOCYTES NFR BLD: 15 % (ref 18–48)
LYMPHOCYTES NFR BLD: 15 % (ref 18–48)
LYMPHOCYTES NFR BLD: 15.6 %
LYMPHOCYTES NFR BLD: 15.7 %
LYMPHOCYTES NFR BLD: 16.6 %
LYMPHOCYTES NFR BLD: 16.6 %
LYMPHOCYTES NFR BLD: 16.8 % (ref 18–48)
LYMPHOCYTES NFR BLD: 17 %
LYMPHOCYTES NFR BLD: 17 %
LYMPHOCYTES NFR BLD: 17.1 %
LYMPHOCYTES NFR BLD: 17.3 %
LYMPHOCYTES NFR BLD: 17.4 % (ref 18–48)
LYMPHOCYTES NFR BLD: 18 %
LYMPHOCYTES NFR BLD: 18.1 % (ref 18–48)
LYMPHOCYTES NFR BLD: 18.6 %
LYMPHOCYTES NFR BLD: 18.9 %
LYMPHOCYTES NFR BLD: 19.2 % (ref 18–48)
LYMPHOCYTES NFR BLD: 19.3 %
LYMPHOCYTES NFR BLD: 19.4 %
LYMPHOCYTES NFR BLD: 19.5 %
LYMPHOCYTES NFR BLD: 19.6 %
LYMPHOCYTES NFR BLD: 19.6 %
LYMPHOCYTES NFR BLD: 20 %
LYMPHOCYTES NFR BLD: 20 %
LYMPHOCYTES NFR BLD: 20.6 %
LYMPHOCYTES NFR BLD: 21.1 %
LYMPHOCYTES NFR BLD: 21.3 %
LYMPHOCYTES NFR BLD: 22.1 %
LYMPHOCYTES NFR BLD: 22.3 % (ref 18–48)
LYMPHOCYTES NFR BLD: 22.5 %
LYMPHOCYTES NFR BLD: 22.9 %
LYMPHOCYTES NFR BLD: 23.1 %
LYMPHOCYTES NFR BLD: 23.1 % (ref 18–48)
LYMPHOCYTES NFR BLD: 23.6 % (ref 18–48)
LYMPHOCYTES NFR BLD: 24 %
LYMPHOCYTES NFR BLD: 24.2 % (ref 18–48)
LYMPHOCYTES NFR BLD: 24.9 % (ref 18–48)
LYMPHOCYTES NFR BLD: 25.2 % (ref 18–48)
LYMPHOCYTES NFR BLD: 25.6 % (ref 18–48)
LYMPHOCYTES NFR BLD: 26 % (ref 18–48)
LYMPHOCYTES NFR BLD: 26.1 %
LYMPHOCYTES NFR BLD: 26.2 % (ref 18–48)
LYMPHOCYTES NFR BLD: 26.3 %
LYMPHOCYTES NFR BLD: 26.3 % (ref 18–48)
LYMPHOCYTES NFR BLD: 26.4 %
LYMPHOCYTES NFR BLD: 26.7 % (ref 18–48)
LYMPHOCYTES NFR BLD: 26.7 % (ref 18–48)
LYMPHOCYTES NFR BLD: 27.3 % (ref 18–48)
LYMPHOCYTES NFR BLD: 27.5 %
LYMPHOCYTES NFR BLD: 27.6 % (ref 18–48)
LYMPHOCYTES NFR BLD: 28.3 % (ref 18–48)
LYMPHOCYTES NFR BLD: 28.4 % (ref 18–48)
LYMPHOCYTES NFR BLD: 28.7 % (ref 18–48)
LYMPHOCYTES NFR BLD: 29.2 % (ref 18–48)
LYMPHOCYTES NFR BLD: 29.5 % (ref 18–48)
LYMPHOCYTES NFR BLD: 29.6 % (ref 18–48)
LYMPHOCYTES NFR BLD: 29.6 % (ref 18–48)
LYMPHOCYTES NFR BLD: 29.9 % (ref 18–48)
LYMPHOCYTES NFR BLD: 30.4 %
LYMPHOCYTES NFR BLD: 30.8 % (ref 18–48)
LYMPHOCYTES NFR BLD: 31 % (ref 18–48)
LYMPHOCYTES NFR BLD: 31.4 % (ref 18–48)
LYMPHOCYTES NFR BLD: 31.4 % (ref 18–48)
LYMPHOCYTES NFR BLD: 31.5 % (ref 18–48)
LYMPHOCYTES NFR BLD: 33 % (ref 18–48)
LYMPHOCYTES NFR BLD: 33 % (ref 18–48)
LYMPHOCYTES NFR BLD: 33.5 % (ref 18–48)
LYMPHOCYTES NFR BLD: 33.5 % (ref 18–48)
LYMPHOCYTES NFR BLD: 33.9 % (ref 18–48)
LYMPHOCYTES NFR BLD: 34 % (ref 18–48)
LYMPHOCYTES NFR BLD: 34.9 % (ref 18–48)
LYMPHOCYTES NFR BLD: 34.9 % (ref 18–48)
LYMPHOCYTES NFR BLD: 35.4 % (ref 18–48)
LYMPHOCYTES NFR BLD: 35.7 % (ref 18–48)
LYMPHOCYTES NFR BLD: 35.7 % (ref 18–48)
LYMPHOCYTES NFR BLD: 36.2 % (ref 18–48)
LYMPHOCYTES NFR BLD: 36.5 % (ref 18–48)
LYMPHOCYTES NFR BLD: 36.9 % (ref 18–48)
LYMPHOCYTES NFR BLD: 37.6 % (ref 18–48)
LYMPHOCYTES NFR BLD: 37.6 % (ref 18–48)
LYMPHOCYTES NFR BLD: 37.9 % (ref 18–48)
LYMPHOCYTES NFR BLD: 38.1 % (ref 18–48)
LYMPHOCYTES NFR BLD: 38.6 % (ref 18–48)
LYMPHOCYTES NFR BLD: 39.2 % (ref 18–48)
LYMPHOCYTES NFR BLD: 39.4 % (ref 18–48)
LYMPHOCYTES NFR BLD: 39.7 % (ref 18–48)
LYMPHOCYTES NFR BLD: 39.9 % (ref 18–48)
LYMPHOCYTES NFR BLD: 40.6 % (ref 18–48)
LYMPHOCYTES NFR BLD: 41.5 % (ref 18–48)
LYMPHOCYTES NFR BLD: 44.2 % (ref 18–48)
LYMPHOCYTES NFR BLD: 44.9 % (ref 18–48)
LYMPHOCYTES NFR BLD: 44.9 % (ref 18–48)
LYMPHOCYTES NFR BLD: 45.4 % (ref 18–48)
LYMPHOCYTES NFR BLD: 45.4 % (ref 18–48)
LYMPHOCYTES NFR BLD: 46.8 % (ref 18–48)
LYMPHOCYTES NFR BLD: 5.4 % (ref 18–48)
LYMPHOCYTES NFR BLD: 6.6 % (ref 18–48)
LYMPHOCYTES NFR BLD: 6.8 % (ref 18–48)
LYMPHOCYTES NFR BLD: 8.8 % (ref 18–48)
LYMPHOCYTES NFR BLD: 9.9 % (ref 18–48)
LYMPHOCYTES NFR FLD MANUAL: 10 %
LYMPHOCYTES NFR FLD MANUAL: 15 %
LYMPHOCYTES NFR FLD MANUAL: 2 %
LYMPHOCYTES NFR FLD MANUAL: 3 %
LYMPHOCYTES NFR FLD MANUAL: 3 %
LYMPHOCYTES NFR FLD MANUAL: 4 %
LYMPHOCYTES NFR FLD MANUAL: 5 %
LYMPHOCYTES NFR FLD MANUAL: 6 %
MAGNESIUM FLD-MCNC: <2 MG/DL
MAGNESIUM SERPL-MCNC: 1.3 MG/DL (ref 1.6–2.6)
MAGNESIUM SERPL-MCNC: 1.3 MG/DL (ref 1.6–2.6)
MAGNESIUM SERPL-MCNC: 1.4 MG/DL
MAGNESIUM SERPL-MCNC: 1.4 MG/DL (ref 1.6–2.6)
MAGNESIUM SERPL-MCNC: 1.5 MG/DL
MAGNESIUM SERPL-MCNC: 1.5 MG/DL (ref 1.6–2.6)
MAGNESIUM SERPL-MCNC: 1.6 MG/DL
MAGNESIUM SERPL-MCNC: 1.6 MG/DL (ref 1.6–2.6)
MAGNESIUM SERPL-MCNC: 1.7 MG/DL
MAGNESIUM SERPL-MCNC: 1.7 MG/DL (ref 1.6–2.6)
MAGNESIUM SERPL-MCNC: 1.8 MG/DL
MAGNESIUM SERPL-MCNC: 1.8 MG/DL (ref 1.6–2.6)
MAGNESIUM SERPL-MCNC: 1.9 MG/DL
MAGNESIUM SERPL-MCNC: 1.9 MG/DL (ref 1.6–2.6)
MAGNESIUM SERPL-MCNC: 2 MG/DL
MAGNESIUM SERPL-MCNC: 2 MG/DL
MAGNESIUM SERPL-MCNC: 2 MG/DL (ref 1.6–2.6)
MAGNESIUM SERPL-MCNC: 2 MG/DL (ref 1.6–2.6)
MAGNESIUM SERPL-MCNC: 2.1 MG/DL (ref 1.6–2.6)
MAGNESIUM SERPL-MCNC: 2.2 MG/DL
MAGNESIUM SERPL-MCNC: 2.2 MG/DL (ref 1.6–2.6)
MAGNESIUM SERPL-MCNC: 2.3 MG/DL (ref 1.6–2.6)
MAGNESIUM SERPL-MCNC: 2.4 MG/DL (ref 1.6–2.6)
MAGNESIUM SERPL-MCNC: 2.4 MG/DL (ref 1.6–2.6)
MAGNESIUM SERPL-MCNC: 2.5 MG/DL
MAGNESIUM SERPL-MCNC: 2.5 MG/DL (ref 1.6–2.6)
MCH RBC QN AUTO: 28.6 PG
MCH RBC QN AUTO: 28.6 PG (ref 27–31)
MCH RBC QN AUTO: 28.7 PG
MCH RBC QN AUTO: 28.7 PG (ref 27–31)
MCH RBC QN AUTO: 28.8 PG
MCH RBC QN AUTO: 28.8 PG (ref 27–31)
MCH RBC QN AUTO: 28.8 PG (ref 27–31)
MCH RBC QN AUTO: 28.9 PG (ref 27–31)
MCH RBC QN AUTO: 28.9 PG (ref 27–31)
MCH RBC QN AUTO: 29 PG (ref 27–31)
MCH RBC QN AUTO: 29 PG (ref 27–31)
MCH RBC QN AUTO: 29.3 PG (ref 27–31)
MCH RBC QN AUTO: 29.4 PG (ref 27–31)
MCH RBC QN AUTO: 29.5 PG (ref 27–31)
MCH RBC QN AUTO: 29.6 PG
MCH RBC QN AUTO: 29.6 PG (ref 27–31)
MCH RBC QN AUTO: 29.7 PG
MCH RBC QN AUTO: 29.7 PG
MCH RBC QN AUTO: 29.7 PG (ref 27–31)
MCH RBC QN AUTO: 29.8 PG
MCH RBC QN AUTO: 29.8 PG (ref 27–31)
MCH RBC QN AUTO: 29.9 PG
MCH RBC QN AUTO: 29.9 PG (ref 27–31)
MCH RBC QN AUTO: 29.9 PG (ref 27–31)
MCH RBC QN AUTO: 30 PG
MCH RBC QN AUTO: 30 PG
MCH RBC QN AUTO: 30 PG (ref 27–31)
MCH RBC QN AUTO: 30.1 PG
MCH RBC QN AUTO: 30.1 PG (ref 27–31)
MCH RBC QN AUTO: 30.2 PG (ref 27–31)
MCH RBC QN AUTO: 30.3 PG
MCH RBC QN AUTO: 30.3 PG (ref 27–31)
MCH RBC QN AUTO: 30.4 PG
MCH RBC QN AUTO: 30.4 PG (ref 27–31)
MCH RBC QN AUTO: 30.5 PG
MCH RBC QN AUTO: 30.5 PG
MCH RBC QN AUTO: 30.5 PG (ref 27–31)
MCH RBC QN AUTO: 30.6 PG
MCH RBC QN AUTO: 30.6 PG (ref 27–31)
MCH RBC QN AUTO: 30.6 PG (ref 27–31)
MCH RBC QN AUTO: 30.7 PG
MCH RBC QN AUTO: 30.7 PG (ref 27–31)
MCH RBC QN AUTO: 30.8 PG (ref 27–31)
MCH RBC QN AUTO: 30.9 PG
MCH RBC QN AUTO: 30.9 PG (ref 27–31)
MCH RBC QN AUTO: 31 PG (ref 27–31)
MCH RBC QN AUTO: 31.1 PG
MCH RBC QN AUTO: 31.1 PG
MCH RBC QN AUTO: 31.1 PG (ref 27–31)
MCH RBC QN AUTO: 31.1 PG (ref 27–31)
MCH RBC QN AUTO: 31.2 PG (ref 27–31)
MCH RBC QN AUTO: 31.3 PG (ref 27–31)
MCH RBC QN AUTO: 31.4 PG (ref 27–31)
MCH RBC QN AUTO: 31.5 PG (ref 27–31)
MCH RBC QN AUTO: 31.6 PG (ref 27–31)
MCH RBC QN AUTO: 31.8 PG (ref 27–31)
MCH RBC QN AUTO: 31.9 PG (ref 27–31)
MCH RBC QN AUTO: 32.1 PG (ref 27–31)
MCH RBC QN AUTO: 32.2 PG (ref 27–31)
MCH RBC QN AUTO: 32.3 PG (ref 27–31)
MCH RBC QN AUTO: 32.5 PG (ref 27–31)
MCH RBC QN AUTO: 32.6 PG (ref 27–31)
MCHC RBC AUTO-ENTMCNC: 28.3 G/DL (ref 32–36)
MCHC RBC AUTO-ENTMCNC: 28.4 G/DL (ref 32–36)
MCHC RBC AUTO-ENTMCNC: 28.4 G/DL (ref 32–36)
MCHC RBC AUTO-ENTMCNC: 28.6 G/DL (ref 32–36)
MCHC RBC AUTO-ENTMCNC: 28.6 G/DL (ref 32–36)
MCHC RBC AUTO-ENTMCNC: 28.8 G/DL (ref 32–36)
MCHC RBC AUTO-ENTMCNC: 28.9 G/DL (ref 32–36)
MCHC RBC AUTO-ENTMCNC: 29.1 G/DL (ref 32–36)
MCHC RBC AUTO-ENTMCNC: 29.2 G/DL (ref 32–36)
MCHC RBC AUTO-ENTMCNC: 29.2 G/DL (ref 32–36)
MCHC RBC AUTO-ENTMCNC: 29.3 G/DL (ref 32–36)
MCHC RBC AUTO-ENTMCNC: 29.3 G/DL (ref 32–36)
MCHC RBC AUTO-ENTMCNC: 29.4 G/DL (ref 32–36)
MCHC RBC AUTO-ENTMCNC: 29.5 G/DL (ref 32–36)
MCHC RBC AUTO-ENTMCNC: 29.5 G/DL (ref 32–36)
MCHC RBC AUTO-ENTMCNC: 29.6 G/DL (ref 32–36)
MCHC RBC AUTO-ENTMCNC: 29.6 G/DL (ref 32–36)
MCHC RBC AUTO-ENTMCNC: 29.7 G/DL
MCHC RBC AUTO-ENTMCNC: 29.7 G/DL (ref 32–36)
MCHC RBC AUTO-ENTMCNC: 29.9 G/DL (ref 32–36)
MCHC RBC AUTO-ENTMCNC: 29.9 G/DL (ref 32–36)
MCHC RBC AUTO-ENTMCNC: 30 G/DL (ref 32–36)
MCHC RBC AUTO-ENTMCNC: 30.2 G/DL
MCHC RBC AUTO-ENTMCNC: 30.2 G/DL (ref 32–36)
MCHC RBC AUTO-ENTMCNC: 30.3 G/DL (ref 32–36)
MCHC RBC AUTO-ENTMCNC: 30.4 G/DL (ref 32–36)
MCHC RBC AUTO-ENTMCNC: 30.5 G/DL
MCHC RBC AUTO-ENTMCNC: 30.5 G/DL (ref 32–36)
MCHC RBC AUTO-ENTMCNC: 30.6 G/DL
MCHC RBC AUTO-ENTMCNC: 30.6 G/DL
MCHC RBC AUTO-ENTMCNC: 30.6 G/DL (ref 32–36)
MCHC RBC AUTO-ENTMCNC: 30.6 G/DL (ref 32–36)
MCHC RBC AUTO-ENTMCNC: 30.7 G/DL
MCHC RBC AUTO-ENTMCNC: 30.7 G/DL (ref 32–36)
MCHC RBC AUTO-ENTMCNC: 30.8 G/DL
MCHC RBC AUTO-ENTMCNC: 30.8 G/DL
MCHC RBC AUTO-ENTMCNC: 30.8 G/DL (ref 32–36)
MCHC RBC AUTO-ENTMCNC: 30.9 G/DL
MCHC RBC AUTO-ENTMCNC: 30.9 G/DL (ref 32–36)
MCHC RBC AUTO-ENTMCNC: 31 G/DL
MCHC RBC AUTO-ENTMCNC: 31 G/DL (ref 32–36)
MCHC RBC AUTO-ENTMCNC: 31.1 G/DL
MCHC RBC AUTO-ENTMCNC: 31.1 G/DL
MCHC RBC AUTO-ENTMCNC: 31.1 G/DL (ref 32–36)
MCHC RBC AUTO-ENTMCNC: 31.2 G/DL (ref 32–36)
MCHC RBC AUTO-ENTMCNC: 31.3 G/DL
MCHC RBC AUTO-ENTMCNC: 31.3 G/DL (ref 32–36)
MCHC RBC AUTO-ENTMCNC: 31.4 G/DL
MCHC RBC AUTO-ENTMCNC: 31.4 G/DL (ref 32–36)
MCHC RBC AUTO-ENTMCNC: 31.5 G/DL
MCHC RBC AUTO-ENTMCNC: 31.5 G/DL (ref 32–36)
MCHC RBC AUTO-ENTMCNC: 31.5 G/DL (ref 32–36)
MCHC RBC AUTO-ENTMCNC: 31.6 G/DL
MCHC RBC AUTO-ENTMCNC: 31.6 G/DL
MCHC RBC AUTO-ENTMCNC: 31.7 G/DL
MCHC RBC AUTO-ENTMCNC: 31.8 G/DL
MCHC RBC AUTO-ENTMCNC: 31.8 G/DL
MCHC RBC AUTO-ENTMCNC: 32.2 G/DL
MCHC RBC AUTO-ENTMCNC: 32.2 G/DL
MCHC RBC AUTO-ENTMCNC: 32.2 G/DL (ref 32–36)
MCHC RBC AUTO-ENTMCNC: 32.4 G/DL (ref 32–36)
MCHC RBC AUTO-ENTMCNC: 32.5 G/DL
MCHC RBC AUTO-ENTMCNC: 32.5 G/DL
MCHC RBC AUTO-ENTMCNC: 32.5 G/DL (ref 32–36)
MCHC RBC AUTO-ENTMCNC: 32.6 G/DL
MCHC RBC AUTO-ENTMCNC: 32.6 G/DL
MCHC RBC AUTO-ENTMCNC: 33 G/DL
MCV RBC AUTO: 100 FL
MCV RBC AUTO: 100 FL
MCV RBC AUTO: 100 FL (ref 82–98)
MCV RBC AUTO: 101 FL
MCV RBC AUTO: 101 FL (ref 82–98)
MCV RBC AUTO: 102 FL (ref 82–98)
MCV RBC AUTO: 103 FL (ref 82–98)
MCV RBC AUTO: 103 FL (ref 82–98)
MCV RBC AUTO: 104 FL (ref 82–98)
MCV RBC AUTO: 104 FL (ref 82–98)
MCV RBC AUTO: 105 FL (ref 82–98)
MCV RBC AUTO: 106 FL (ref 82–98)
MCV RBC AUTO: 106 FL (ref 82–98)
MCV RBC AUTO: 107 FL (ref 82–98)
MCV RBC AUTO: 107 FL (ref 82–98)
MCV RBC AUTO: 108 FL (ref 82–98)
MCV RBC AUTO: 110 FL (ref 82–98)
MCV RBC AUTO: 110 FL (ref 82–98)
MCV RBC AUTO: 111 FL (ref 82–98)
MCV RBC AUTO: 113 FL (ref 82–98)
MCV RBC AUTO: 90 FL
MCV RBC AUTO: 91 FL
MCV RBC AUTO: 91 FL (ref 82–98)
MCV RBC AUTO: 92 FL
MCV RBC AUTO: 92 FL
MCV RBC AUTO: 92 FL (ref 82–98)
MCV RBC AUTO: 93 FL (ref 82–98)
MCV RBC AUTO: 93 FL (ref 82–98)
MCV RBC AUTO: 94 FL
MCV RBC AUTO: 95 FL
MCV RBC AUTO: 95 FL (ref 82–98)
MCV RBC AUTO: 96 FL
MCV RBC AUTO: 96 FL (ref 82–98)
MCV RBC AUTO: 97 FL
MCV RBC AUTO: 97 FL (ref 82–98)
MCV RBC AUTO: 98 FL
MCV RBC AUTO: 98 FL (ref 82–98)
MCV RBC AUTO: 99 FL
MCV RBC AUTO: 99 FL
MCV RBC AUTO: 99 FL (ref 82–98)
METAMYELOCYTES NFR BLD MANUAL: 5 %
METAMYELOCYTES NFR BLD MANUAL: 5 %
MIN VOL: 10.8
MIN VOL: 10.8
MIN VOL: 10.9
MODE: ABNORMAL
MONOCYTES # BLD AUTO: 0.1 K/UL (ref 0.3–1)
MONOCYTES # BLD AUTO: 0.3 K/UL
MONOCYTES # BLD AUTO: 0.3 K/UL (ref 0.3–1)
MONOCYTES # BLD AUTO: 0.3 K/UL (ref 0.3–1)
MONOCYTES # BLD AUTO: 0.4 K/UL
MONOCYTES # BLD AUTO: 0.4 K/UL (ref 0.3–1)
MONOCYTES # BLD AUTO: 0.4 K/UL (ref 0.3–1)
MONOCYTES # BLD AUTO: 0.5 K/UL
MONOCYTES # BLD AUTO: 0.5 K/UL (ref 0.3–1)
MONOCYTES # BLD AUTO: 0.6 K/UL
MONOCYTES # BLD AUTO: 0.6 K/UL
MONOCYTES # BLD AUTO: 0.6 K/UL (ref 0.3–1)
MONOCYTES # BLD AUTO: 0.7 K/UL
MONOCYTES # BLD AUTO: 0.7 K/UL (ref 0.3–1)
MONOCYTES # BLD AUTO: 0.8 K/UL
MONOCYTES # BLD AUTO: 0.8 K/UL (ref 0.3–1)
MONOCYTES # BLD AUTO: 0.9 K/UL
MONOCYTES # BLD AUTO: 0.9 K/UL (ref 0.3–1)
MONOCYTES # BLD AUTO: 1 K/UL
MONOCYTES # BLD AUTO: 1 K/UL (ref 0.3–1)
MONOCYTES # BLD AUTO: 1.1 K/UL (ref 0.3–1)
MONOCYTES # BLD AUTO: 1.2 K/UL (ref 0.3–1)
MONOCYTES # BLD AUTO: 1.3 K/UL (ref 0.3–1)
MONOCYTES # BLD AUTO: 1.4 K/UL (ref 0.3–1)
MONOCYTES NFR BLD: 0.8 % (ref 4–15)
MONOCYTES NFR BLD: 10.3 %
MONOCYTES NFR BLD: 10.3 % (ref 4–15)
MONOCYTES NFR BLD: 10.6 %
MONOCYTES NFR BLD: 11 % (ref 4–15)
MONOCYTES NFR BLD: 11.7 %
MONOCYTES NFR BLD: 2.2 %
MONOCYTES NFR BLD: 2.6 % (ref 4–15)
MONOCYTES NFR BLD: 3.3 %
MONOCYTES NFR BLD: 3.7 % (ref 4–15)
MONOCYTES NFR BLD: 3.9 %
MONOCYTES NFR BLD: 3.9 % (ref 4–15)
MONOCYTES NFR BLD: 4 % (ref 4–15)
MONOCYTES NFR BLD: 4.2 % (ref 4–15)
MONOCYTES NFR BLD: 4.3 % (ref 4–15)
MONOCYTES NFR BLD: 4.4 %
MONOCYTES NFR BLD: 4.5 % (ref 4–15)
MONOCYTES NFR BLD: 4.5 % (ref 4–15)
MONOCYTES NFR BLD: 4.6 %
MONOCYTES NFR BLD: 4.6 % (ref 4–15)
MONOCYTES NFR BLD: 4.7 %
MONOCYTES NFR BLD: 4.7 %
MONOCYTES NFR BLD: 4.7 % (ref 4–15)
MONOCYTES NFR BLD: 4.9 % (ref 4–15)
MONOCYTES NFR BLD: 5 %
MONOCYTES NFR BLD: 5 % (ref 4–15)
MONOCYTES NFR BLD: 5 % (ref 4–15)
MONOCYTES NFR BLD: 5.1 % (ref 4–15)
MONOCYTES NFR BLD: 5.2 % (ref 4–15)
MONOCYTES NFR BLD: 5.2 % (ref 4–15)
MONOCYTES NFR BLD: 5.3 %
MONOCYTES NFR BLD: 5.3 % (ref 4–15)
MONOCYTES NFR BLD: 5.4 % (ref 4–15)
MONOCYTES NFR BLD: 5.5 % (ref 4–15)
MONOCYTES NFR BLD: 5.7 %
MONOCYTES NFR BLD: 5.7 %
MONOCYTES NFR BLD: 5.7 % (ref 4–15)
MONOCYTES NFR BLD: 5.8 %
MONOCYTES NFR BLD: 5.8 % (ref 4–15)
MONOCYTES NFR BLD: 5.9 %
MONOCYTES NFR BLD: 5.9 % (ref 4–15)
MONOCYTES NFR BLD: 6 %
MONOCYTES NFR BLD: 6 % (ref 4–15)
MONOCYTES NFR BLD: 6.1 % (ref 4–15)
MONOCYTES NFR BLD: 6.2 % (ref 4–15)
MONOCYTES NFR BLD: 6.4 %
MONOCYTES NFR BLD: 6.4 % (ref 4–15)
MONOCYTES NFR BLD: 6.4 % (ref 4–15)
MONOCYTES NFR BLD: 6.5 %
MONOCYTES NFR BLD: 6.5 % (ref 4–15)
MONOCYTES NFR BLD: 6.6 %
MONOCYTES NFR BLD: 6.6 % (ref 4–15)
MONOCYTES NFR BLD: 6.7 % (ref 4–15)
MONOCYTES NFR BLD: 6.8 % (ref 4–15)
MONOCYTES NFR BLD: 6.8 % (ref 4–15)
MONOCYTES NFR BLD: 6.9 % (ref 4–15)
MONOCYTES NFR BLD: 7.1 % (ref 4–15)
MONOCYTES NFR BLD: 7.2 %
MONOCYTES NFR BLD: 7.2 % (ref 4–15)
MONOCYTES NFR BLD: 7.2 % (ref 4–15)
MONOCYTES NFR BLD: 7.3 % (ref 4–15)
MONOCYTES NFR BLD: 7.5 %
MONOCYTES NFR BLD: 7.6 % (ref 4–15)
MONOCYTES NFR BLD: 7.6 % (ref 4–15)
MONOCYTES NFR BLD: 7.7 %
MONOCYTES NFR BLD: 7.7 % (ref 4–15)
MONOCYTES NFR BLD: 7.8 % (ref 4–15)
MONOCYTES NFR BLD: 7.9 % (ref 4–15)
MONOCYTES NFR BLD: 8 %
MONOCYTES NFR BLD: 8 % (ref 4–15)
MONOCYTES NFR BLD: 8 % (ref 4–15)
MONOCYTES NFR BLD: 8.2 % (ref 4–15)
MONOCYTES NFR BLD: 8.2 % (ref 4–15)
MONOCYTES NFR BLD: 8.3 %
MONOCYTES NFR BLD: 8.3 % (ref 4–15)
MONOCYTES NFR BLD: 8.3 % (ref 4–15)
MONOCYTES NFR BLD: 8.4 %
MONOCYTES NFR BLD: 8.4 % (ref 4–15)
MONOCYTES NFR BLD: 8.6 %
MONOCYTES NFR BLD: 8.7 %
MONOCYTES NFR BLD: 8.8 %
MONOCYTES NFR BLD: 8.8 % (ref 4–15)
MONOCYTES NFR BLD: 8.9 % (ref 4–15)
MONOCYTES NFR BLD: 8.9 % (ref 4–15)
MONOCYTES NFR BLD: 9 % (ref 4–15)
MONOCYTES NFR BLD: 9.2 % (ref 4–15)
MONOCYTES NFR BLD: 9.3 %
MONOCYTES NFR BLD: 9.5 % (ref 4–15)
MONOS+MACROS NFR FLD MANUAL: 1 %
MONOS+MACROS NFR FLD MANUAL: 16 %
MONOS+MACROS NFR FLD MANUAL: 2 %
MONOS+MACROS NFR FLD MANUAL: 2 %
MONOS+MACROS NFR FLD MANUAL: 3 %
MONOS+MACROS NFR FLD MANUAL: 5 %
MONOS+MACROS NFR FLD MANUAL: 8 %
MV PEAK A VEL: 0.59 M/S
MV PEAK A VEL: 0.7 M/S
MV PEAK A VEL: 0.89 M/S
MV PEAK E VEL: 0.67 M/S
MV PEAK E VEL: 0.79 M/S
MV PEAK E VEL: 0.96 M/S
MV PEAK E VEL: 1.07 M/S
MYCOBACTERIUM SPEC QL CULT: NORMAL
MYELOCYTES NFR BLD MANUAL: 1 %
MYELOCYTES NFR BLD MANUAL: 1 %
NEUTROPHILS # BLD AUTO: 10 K/UL (ref 1.8–7.7)
NEUTROPHILS # BLD AUTO: 10.6 K/UL (ref 1.8–7.7)
NEUTROPHILS # BLD AUTO: 10.7 K/UL (ref 1.8–7.7)
NEUTROPHILS # BLD AUTO: 10.8 K/UL (ref 1.8–7.7)
NEUTROPHILS # BLD AUTO: 11.9 K/UL
NEUTROPHILS # BLD AUTO: 11.9 K/UL (ref 1.8–7.7)
NEUTROPHILS # BLD AUTO: 12.4 K/UL (ref 1.8–7.7)
NEUTROPHILS # BLD AUTO: 12.8 K/UL (ref 1.8–7.7)
NEUTROPHILS # BLD AUTO: 12.8 K/UL (ref 1.8–7.7)
NEUTROPHILS # BLD AUTO: 13.3 K/UL (ref 1.8–7.7)
NEUTROPHILS # BLD AUTO: 13.4 K/UL
NEUTROPHILS # BLD AUTO: 14.2 K/UL (ref 1.8–7.7)
NEUTROPHILS # BLD AUTO: 14.3 K/UL (ref 1.8–7.7)
NEUTROPHILS # BLD AUTO: 14.4 K/UL
NEUTROPHILS # BLD AUTO: 15.5 K/UL (ref 1.8–7.7)
NEUTROPHILS # BLD AUTO: 15.5 K/UL (ref 1.8–7.7)
NEUTROPHILS # BLD AUTO: 16.5 K/UL (ref 1.8–7.7)
NEUTROPHILS # BLD AUTO: 17.1 K/UL (ref 1.8–7.7)
NEUTROPHILS # BLD AUTO: 17.7 K/UL (ref 1.8–7.7)
NEUTROPHILS # BLD AUTO: 19.7 K/UL (ref 1.8–7.7)
NEUTROPHILS # BLD AUTO: 3.8 K/UL
NEUTROPHILS # BLD AUTO: 3.8 K/UL (ref 1.8–7.7)
NEUTROPHILS # BLD AUTO: 3.8 K/UL (ref 1.8–7.7)
NEUTROPHILS # BLD AUTO: 4 K/UL (ref 1.8–7.7)
NEUTROPHILS # BLD AUTO: 4.1 K/UL (ref 1.8–7.7)
NEUTROPHILS # BLD AUTO: 4.2 K/UL (ref 1.8–7.7)
NEUTROPHILS # BLD AUTO: 4.3 K/UL
NEUTROPHILS # BLD AUTO: 4.4 K/UL (ref 1.8–7.7)
NEUTROPHILS # BLD AUTO: 4.5 K/UL
NEUTROPHILS # BLD AUTO: 4.5 K/UL (ref 1.8–7.7)
NEUTROPHILS # BLD AUTO: 4.6 K/UL (ref 1.8–7.7)
NEUTROPHILS # BLD AUTO: 4.8 K/UL (ref 1.8–7.7)
NEUTROPHILS # BLD AUTO: 4.8 K/UL (ref 1.8–7.7)
NEUTROPHILS # BLD AUTO: 4.9 K/UL (ref 1.8–7.7)
NEUTROPHILS # BLD AUTO: 5 K/UL (ref 1.8–7.7)
NEUTROPHILS # BLD AUTO: 5 K/UL (ref 1.8–7.7)
NEUTROPHILS # BLD AUTO: 5.1 K/UL (ref 1.8–7.7)
NEUTROPHILS # BLD AUTO: 5.2 K/UL (ref 1.8–7.7)
NEUTROPHILS # BLD AUTO: 5.2 K/UL (ref 1.8–7.7)
NEUTROPHILS # BLD AUTO: 5.3 K/UL (ref 1.8–7.7)
NEUTROPHILS # BLD AUTO: 5.4 K/UL (ref 1.8–7.7)
NEUTROPHILS # BLD AUTO: 5.5 K/UL (ref 1.8–7.7)
NEUTROPHILS # BLD AUTO: 5.5 K/UL (ref 1.8–7.7)
NEUTROPHILS # BLD AUTO: 5.6 K/UL
NEUTROPHILS # BLD AUTO: 5.6 K/UL (ref 1.8–7.7)
NEUTROPHILS # BLD AUTO: 5.7 K/UL (ref 1.8–7.7)
NEUTROPHILS # BLD AUTO: 5.7 K/UL (ref 1.8–7.7)
NEUTROPHILS # BLD AUTO: 5.8 K/UL
NEUTROPHILS # BLD AUTO: 5.8 K/UL
NEUTROPHILS # BLD AUTO: 5.8 K/UL (ref 1.8–7.7)
NEUTROPHILS # BLD AUTO: 5.9 K/UL (ref 1.8–7.7)
NEUTROPHILS # BLD AUTO: 5.9 K/UL (ref 1.8–7.7)
NEUTROPHILS # BLD AUTO: 6 K/UL (ref 1.8–7.7)
NEUTROPHILS # BLD AUTO: 6.1 K/UL
NEUTROPHILS # BLD AUTO: 6.2 K/UL
NEUTROPHILS # BLD AUTO: 6.2 K/UL (ref 1.8–7.7)
NEUTROPHILS # BLD AUTO: 6.3 K/UL
NEUTROPHILS # BLD AUTO: 6.4 K/UL
NEUTROPHILS # BLD AUTO: 6.4 K/UL (ref 1.8–7.7)
NEUTROPHILS # BLD AUTO: 6.5 K/UL
NEUTROPHILS # BLD AUTO: 6.6 K/UL
NEUTROPHILS # BLD AUTO: 6.6 K/UL (ref 1.8–7.7)
NEUTROPHILS # BLD AUTO: 6.7 K/UL
NEUTROPHILS # BLD AUTO: 6.7 K/UL (ref 1.8–7.7)
NEUTROPHILS # BLD AUTO: 6.8 K/UL
NEUTROPHILS # BLD AUTO: 6.8 K/UL (ref 1.8–7.7)
NEUTROPHILS # BLD AUTO: 7 K/UL
NEUTROPHILS # BLD AUTO: 7 K/UL (ref 1.8–7.7)
NEUTROPHILS # BLD AUTO: 7.1 K/UL
NEUTROPHILS # BLD AUTO: 7.1 K/UL
NEUTROPHILS # BLD AUTO: 7.1 K/UL (ref 1.8–7.7)
NEUTROPHILS # BLD AUTO: 7.3 K/UL (ref 1.8–7.7)
NEUTROPHILS # BLD AUTO: 7.5 K/UL
NEUTROPHILS # BLD AUTO: 7.5 K/UL (ref 1.8–7.7)
NEUTROPHILS # BLD AUTO: 7.5 K/UL (ref 1.8–7.7)
NEUTROPHILS # BLD AUTO: 7.6 K/UL
NEUTROPHILS # BLD AUTO: 7.6 K/UL
NEUTROPHILS # BLD AUTO: 7.6 K/UL (ref 1.8–7.7)
NEUTROPHILS # BLD AUTO: 7.7 K/UL (ref 1.8–7.7)
NEUTROPHILS # BLD AUTO: 7.8 K/UL
NEUTROPHILS # BLD AUTO: 7.8 K/UL (ref 1.8–7.7)
NEUTROPHILS # BLD AUTO: 7.9 K/UL (ref 1.8–7.7)
NEUTROPHILS # BLD AUTO: 8 K/UL
NEUTROPHILS # BLD AUTO: 8.1 K/UL (ref 1.8–7.7)
NEUTROPHILS # BLD AUTO: 8.2 K/UL (ref 1.8–7.7)
NEUTROPHILS # BLD AUTO: 8.3 K/UL (ref 1.8–7.7)
NEUTROPHILS # BLD AUTO: 8.5 K/UL
NEUTROPHILS # BLD AUTO: 8.5 K/UL (ref 1.8–7.7)
NEUTROPHILS # BLD AUTO: 8.5 K/UL (ref 1.8–7.7)
NEUTROPHILS # BLD AUTO: 8.6 K/UL (ref 1.8–7.7)
NEUTROPHILS # BLD AUTO: 8.7 K/UL
NEUTROPHILS # BLD AUTO: 8.7 K/UL (ref 1.8–7.7)
NEUTROPHILS # BLD AUTO: 8.8 K/UL
NEUTROPHILS # BLD AUTO: 8.9 K/UL
NEUTROPHILS # BLD AUTO: 8.9 K/UL (ref 1.8–7.7)
NEUTROPHILS # BLD AUTO: 9.7 K/UL
NEUTROPHILS # BLD AUTO: 9.7 K/UL (ref 1.8–7.7)
NEUTROPHILS NFR BLD: 43.5 % (ref 38–73)
NEUTROPHILS NFR BLD: 43.5 % (ref 38–73)
NEUTROPHILS NFR BLD: 44.1 % (ref 38–73)
NEUTROPHILS NFR BLD: 44.5 % (ref 38–73)
NEUTROPHILS NFR BLD: 46.5 % (ref 38–73)
NEUTROPHILS NFR BLD: 47.8 % (ref 38–73)
NEUTROPHILS NFR BLD: 49.4 % (ref 38–73)
NEUTROPHILS NFR BLD: 49.8 % (ref 38–73)
NEUTROPHILS NFR BLD: 50.8 % (ref 38–73)
NEUTROPHILS NFR BLD: 51.2 % (ref 38–73)
NEUTROPHILS NFR BLD: 51.5 % (ref 38–73)
NEUTROPHILS NFR BLD: 51.8 % (ref 38–73)
NEUTROPHILS NFR BLD: 52.3 % (ref 38–73)
NEUTROPHILS NFR BLD: 52.3 % (ref 38–73)
NEUTROPHILS NFR BLD: 52.4 % (ref 38–73)
NEUTROPHILS NFR BLD: 52.5 % (ref 38–73)
NEUTROPHILS NFR BLD: 52.6 % (ref 38–73)
NEUTROPHILS NFR BLD: 52.7 % (ref 38–73)
NEUTROPHILS NFR BLD: 52.7 % (ref 38–73)
NEUTROPHILS NFR BLD: 52.9 % (ref 38–73)
NEUTROPHILS NFR BLD: 53 % (ref 38–73)
NEUTROPHILS NFR BLD: 53.2 % (ref 38–73)
NEUTROPHILS NFR BLD: 54.2 % (ref 38–73)
NEUTROPHILS NFR BLD: 54.9 % (ref 38–73)
NEUTROPHILS NFR BLD: 55.1 % (ref 38–73)
NEUTROPHILS NFR BLD: 55.3 % (ref 38–73)
NEUTROPHILS NFR BLD: 56 % (ref 38–73)
NEUTROPHILS NFR BLD: 56.2 % (ref 38–73)
NEUTROPHILS NFR BLD: 56.3 % (ref 38–73)
NEUTROPHILS NFR BLD: 57.6 % (ref 38–73)
NEUTROPHILS NFR BLD: 57.9 % (ref 38–73)
NEUTROPHILS NFR BLD: 58 %
NEUTROPHILS NFR BLD: 58.6 % (ref 38–73)
NEUTROPHILS NFR BLD: 58.8 % (ref 38–73)
NEUTROPHILS NFR BLD: 59 % (ref 38–73)
NEUTROPHILS NFR BLD: 59.4 % (ref 38–73)
NEUTROPHILS NFR BLD: 59.5 % (ref 38–73)
NEUTROPHILS NFR BLD: 59.5 % (ref 38–73)
NEUTROPHILS NFR BLD: 59.7 % (ref 38–73)
NEUTROPHILS NFR BLD: 60 % (ref 38–73)
NEUTROPHILS NFR BLD: 60.3 % (ref 38–73)
NEUTROPHILS NFR BLD: 60.6 % (ref 38–73)
NEUTROPHILS NFR BLD: 60.8 % (ref 38–73)
NEUTROPHILS NFR BLD: 61.3 % (ref 38–73)
NEUTROPHILS NFR BLD: 61.4 % (ref 38–73)
NEUTROPHILS NFR BLD: 61.8 %
NEUTROPHILS NFR BLD: 62.1 % (ref 38–73)
NEUTROPHILS NFR BLD: 62.4 %
NEUTROPHILS NFR BLD: 62.6 %
NEUTROPHILS NFR BLD: 63.4 %
NEUTROPHILS NFR BLD: 63.7 %
NEUTROPHILS NFR BLD: 63.9 % (ref 38–73)
NEUTROPHILS NFR BLD: 65 % (ref 38–73)
NEUTROPHILS NFR BLD: 65 % (ref 38–73)
NEUTROPHILS NFR BLD: 65.3 % (ref 38–73)
NEUTROPHILS NFR BLD: 65.4 % (ref 38–73)
NEUTROPHILS NFR BLD: 65.5 % (ref 38–73)
NEUTROPHILS NFR BLD: 65.8 % (ref 38–73)
NEUTROPHILS NFR BLD: 65.9 % (ref 38–73)
NEUTROPHILS NFR BLD: 66.2 % (ref 38–73)
NEUTROPHILS NFR BLD: 66.4 % (ref 38–73)
NEUTROPHILS NFR BLD: 66.8 % (ref 38–73)
NEUTROPHILS NFR BLD: 67.5 %
NEUTROPHILS NFR BLD: 67.5 % (ref 38–73)
NEUTROPHILS NFR BLD: 67.8 %
NEUTROPHILS NFR BLD: 68 %
NEUTROPHILS NFR BLD: 68.4 %
NEUTROPHILS NFR BLD: 69.2 % (ref 38–73)
NEUTROPHILS NFR BLD: 69.3 %
NEUTROPHILS NFR BLD: 69.4 %
NEUTROPHILS NFR BLD: 70.3 % (ref 38–73)
NEUTROPHILS NFR BLD: 70.5 %
NEUTROPHILS NFR BLD: 71 %
NEUTROPHILS NFR BLD: 71 % (ref 38–73)
NEUTROPHILS NFR BLD: 71.1 %
NEUTROPHILS NFR BLD: 71.2 % (ref 38–73)
NEUTROPHILS NFR BLD: 71.3 %
NEUTROPHILS NFR BLD: 71.4 %
NEUTROPHILS NFR BLD: 72.4 %
NEUTROPHILS NFR BLD: 72.7 %
NEUTROPHILS NFR BLD: 73.1 %
NEUTROPHILS NFR BLD: 73.5 %
NEUTROPHILS NFR BLD: 73.5 %
NEUTROPHILS NFR BLD: 73.6 %
NEUTROPHILS NFR BLD: 74.3 %
NEUTROPHILS NFR BLD: 74.7 %
NEUTROPHILS NFR BLD: 74.8 % (ref 38–73)
NEUTROPHILS NFR BLD: 74.8 % (ref 38–73)
NEUTROPHILS NFR BLD: 76.2 %
NEUTROPHILS NFR BLD: 77.9 %
NEUTROPHILS NFR BLD: 78.1 %
NEUTROPHILS NFR BLD: 78.1 % (ref 38–73)
NEUTROPHILS NFR BLD: 78.6 % (ref 38–73)
NEUTROPHILS NFR BLD: 79.2 %
NEUTROPHILS NFR BLD: 79.3 % (ref 38–73)
NEUTROPHILS NFR BLD: 79.9 %
NEUTROPHILS NFR BLD: 80 % (ref 38–73)
NEUTROPHILS NFR BLD: 80.5 %
NEUTROPHILS NFR BLD: 80.5 % (ref 38–73)
NEUTROPHILS NFR BLD: 80.7 % (ref 38–73)
NEUTROPHILS NFR BLD: 80.9 % (ref 38–73)
NEUTROPHILS NFR BLD: 81.7 % (ref 38–73)
NEUTROPHILS NFR BLD: 81.7 % (ref 38–73)
NEUTROPHILS NFR BLD: 81.9 % (ref 38–73)
NEUTROPHILS NFR BLD: 82 % (ref 38–73)
NEUTROPHILS NFR BLD: 82.1 % (ref 38–73)
NEUTROPHILS NFR BLD: 83.3 % (ref 38–73)
NEUTROPHILS NFR BLD: 85.3 % (ref 38–73)
NEUTROPHILS NFR BLD: 85.9 % (ref 38–73)
NEUTROPHILS NFR BLD: 86.9 %
NEUTROPHILS NFR BLD: 89.6 % (ref 38–73)
NEUTROPHILS NFR FLD MANUAL: 80 %
NEUTROPHILS NFR FLD MANUAL: 85 %
NEUTROPHILS NFR FLD MANUAL: 89 %
NEUTROPHILS NFR FLD MANUAL: 90 %
NEUTROPHILS NFR FLD MANUAL: 91 %
NEUTROPHILS NFR FLD MANUAL: 92 %
NEUTROPHILS NFR FLD MANUAL: 95 %
NEUTROPHILS NFR FLD MANUAL: 96 %
NEUTROPHILS NFR FLD MANUAL: 97 %
NEUTROPHILS NFR FLD MANUAL: 98 %
NEUTS BAND NFR BLD MANUAL: 10 %
NEUTS BAND NFR BLD MANUAL: 10 %
NRBC BLD-RTO: 0 /100 WBC
NRBC BLD-RTO: 1 /100 WBC
NRBC BLD-RTO: 2 /100 WBC
NRBC BLD-RTO: 3 /100 WBC
NRBC BLD-RTO: 4 /100 WBC
NRBC BLD-RTO: 5 /100 WBC
NUM UNITS TRANS PACKED RBC: NORMAL
OB PNL STL: POSITIVE
OB PNL STL: POSITIVE
OSMOL GAP STL: 34 MOSM/KG
OSMOLALITY STL: 280 MOSM/KG
OVALOCYTES BLD QL SMEAR: ABNORMAL
PCO2 BLDA: 29.6 MMHG (ref 35–45)
PCO2 BLDA: 32.8 MMHG (ref 35–45)
PCO2 BLDA: 33.4 MMHG (ref 35–45)
PCO2 BLDA: 35.2 MMHG (ref 35–45)
PCO2 BLDA: 37 MMHG (ref 35–45)
PCO2 BLDA: 38.2 MMHG (ref 35–45)
PCO2 BLDA: 39.1 MMHG (ref 35–45)
PCO2 BLDA: 39.5 MMHG (ref 35–45)
PCO2 BLDA: 39.7 MMHG (ref 35–45)
PCO2 BLDA: 39.9 MMHG (ref 35–45)
PCO2 BLDA: 40.9 MMHG (ref 35–45)
PCO2 BLDA: 44.5 MMHG (ref 35–45)
PCO2 BLDA: 47.1 MMHG (ref 35–45)
PCO2 BLDA: 49.1 MMHG (ref 35–45)
PEEP: 5
PEEP: 6
PH SMN: 7.13 [PH] (ref 7.35–7.45)
PH SMN: 7.19 [PH] (ref 7.35–7.45)
PH SMN: 7.2 [PH] (ref 7.35–7.45)
PH SMN: 7.22 [PH] (ref 7.35–7.45)
PH SMN: 7.24 [PH] (ref 7.35–7.45)
PH SMN: 7.25 [PH] (ref 7.35–7.45)
PH SMN: 7.26 [PH] (ref 7.35–7.45)
PH SMN: 7.26 [PH] (ref 7.35–7.45)
PH SMN: 7.3 [PH] (ref 7.35–7.45)
PH SMN: 7.31 [PH] (ref 7.35–7.45)
PH SMN: 7.32 [PH] (ref 7.35–7.45)
PH SMN: 7.35 [PH] (ref 7.35–7.45)
PH SMN: 7.4 [PH] (ref 7.35–7.45)
PH SMN: 7.41 [PH] (ref 7.35–7.45)
PHOSPHATE SERPL-MCNC: 10.2 MG/DL
PHOSPHATE SERPL-MCNC: 2.4 MG/DL (ref 2.7–4.5)
PHOSPHATE SERPL-MCNC: 2.6 MG/DL (ref 2.7–4.5)
PHOSPHATE SERPL-MCNC: 2.9 MG/DL (ref 2.7–4.5)
PHOSPHATE SERPL-MCNC: 3 MG/DL (ref 2.7–4.5)
PHOSPHATE SERPL-MCNC: 3.1 MG/DL (ref 2.7–4.5)
PHOSPHATE SERPL-MCNC: 3.1 MG/DL (ref 2.7–4.5)
PHOSPHATE SERPL-MCNC: 3.2 MG/DL (ref 2.7–4.5)
PHOSPHATE SERPL-MCNC: 3.2 MG/DL (ref 2.7–4.5)
PHOSPHATE SERPL-MCNC: 3.3 MG/DL (ref 2.7–4.5)
PHOSPHATE SERPL-MCNC: 3.4 MG/DL (ref 2.7–4.5)
PHOSPHATE SERPL-MCNC: 3.4 MG/DL (ref 2.7–4.5)
PHOSPHATE SERPL-MCNC: 3.5 MG/DL (ref 2.7–4.5)
PHOSPHATE SERPL-MCNC: 3.5 MG/DL (ref 2.7–4.5)
PHOSPHATE SERPL-MCNC: 3.6 MG/DL (ref 2.7–4.5)
PHOSPHATE SERPL-MCNC: 3.6 MG/DL (ref 2.7–4.5)
PHOSPHATE SERPL-MCNC: 3.7 MG/DL (ref 2.7–4.5)
PHOSPHATE SERPL-MCNC: 3.8 MG/DL
PHOSPHATE SERPL-MCNC: 3.8 MG/DL (ref 2.7–4.5)
PHOSPHATE SERPL-MCNC: 3.9 MG/DL (ref 2.7–4.5)
PHOSPHATE SERPL-MCNC: 4 MG/DL (ref 2.7–4.5)
PHOSPHATE SERPL-MCNC: 4.1 MG/DL (ref 2.7–4.5)
PHOSPHATE SERPL-MCNC: 4.1 MG/DL (ref 2.7–4.5)
PHOSPHATE SERPL-MCNC: 4.2 MG/DL (ref 2.7–4.5)
PHOSPHATE SERPL-MCNC: 4.4 MG/DL
PHOSPHATE SERPL-MCNC: 4.4 MG/DL
PHOSPHATE SERPL-MCNC: 4.4 MG/DL (ref 2.7–4.5)
PHOSPHATE SERPL-MCNC: 4.4 MG/DL (ref 2.7–4.5)
PHOSPHATE SERPL-MCNC: 4.5 MG/DL
PHOSPHATE SERPL-MCNC: 4.5 MG/DL (ref 2.7–4.5)
PHOSPHATE SERPL-MCNC: 4.5 MG/DL (ref 2.7–4.5)
PHOSPHATE SERPL-MCNC: 4.6 MG/DL
PHOSPHATE SERPL-MCNC: 4.6 MG/DL (ref 2.7–4.5)
PHOSPHATE SERPL-MCNC: 4.7 MG/DL
PHOSPHATE SERPL-MCNC: 4.7 MG/DL (ref 2.7–4.5)
PHOSPHATE SERPL-MCNC: 4.7 MG/DL (ref 2.7–4.5)
PHOSPHATE SERPL-MCNC: 4.8 MG/DL (ref 2.7–4.5)
PHOSPHATE SERPL-MCNC: 4.9 MG/DL
PHOSPHATE SERPL-MCNC: 4.9 MG/DL (ref 2.7–4.5)
PHOSPHATE SERPL-MCNC: 5 MG/DL (ref 2.7–4.5)
PHOSPHATE SERPL-MCNC: 5.1 MG/DL
PHOSPHATE SERPL-MCNC: 5.1 MG/DL (ref 2.7–4.5)
PHOSPHATE SERPL-MCNC: 5.2 MG/DL
PHOSPHATE SERPL-MCNC: 5.2 MG/DL
PHOSPHATE SERPL-MCNC: 5.2 MG/DL (ref 2.7–4.5)
PHOSPHATE SERPL-MCNC: 5.2 MG/DL (ref 2.7–4.5)
PHOSPHATE SERPL-MCNC: 5.3 MG/DL
PHOSPHATE SERPL-MCNC: 5.3 MG/DL
PHOSPHATE SERPL-MCNC: 5.3 MG/DL (ref 2.7–4.5)
PHOSPHATE SERPL-MCNC: 5.3 MG/DL (ref 2.7–4.5)
PHOSPHATE SERPL-MCNC: 5.4 MG/DL
PHOSPHATE SERPL-MCNC: 5.4 MG/DL
PHOSPHATE SERPL-MCNC: 5.4 MG/DL (ref 2.7–4.5)
PHOSPHATE SERPL-MCNC: 5.5 MG/DL
PHOSPHATE SERPL-MCNC: 5.5 MG/DL (ref 2.7–4.5)
PHOSPHATE SERPL-MCNC: 5.6 MG/DL (ref 2.7–4.5)
PHOSPHATE SERPL-MCNC: 5.7 MG/DL
PHOSPHATE SERPL-MCNC: 5.7 MG/DL
PHOSPHATE SERPL-MCNC: 5.7 MG/DL (ref 2.7–4.5)
PHOSPHATE SERPL-MCNC: 5.7 MG/DL (ref 2.7–4.5)
PHOSPHATE SERPL-MCNC: 5.8 MG/DL
PHOSPHATE SERPL-MCNC: 5.8 MG/DL (ref 2.7–4.5)
PHOSPHATE SERPL-MCNC: 6 MG/DL
PHOSPHATE SERPL-MCNC: 6 MG/DL
PHOSPHATE SERPL-MCNC: 6 MG/DL (ref 2.7–4.5)
PHOSPHATE SERPL-MCNC: 6.1 MG/DL
PHOSPHATE SERPL-MCNC: 6.2 MG/DL
PHOSPHATE SERPL-MCNC: 6.2 MG/DL (ref 2.7–4.5)
PHOSPHATE SERPL-MCNC: 6.3 MG/DL (ref 2.7–4.5)
PHOSPHATE SERPL-MCNC: 6.4 MG/DL
PHOSPHATE SERPL-MCNC: 6.4 MG/DL (ref 2.7–4.5)
PHOSPHATE SERPL-MCNC: 6.5 MG/DL (ref 2.7–4.5)
PHOSPHATE SERPL-MCNC: 6.9 MG/DL (ref 2.7–4.5)
PHOSPHATE SERPL-MCNC: 6.9 MG/DL (ref 2.7–4.5)
PHOSPHATE SERPL-MCNC: 7 MG/DL (ref 2.7–4.5)
PHOSPHATE SERPL-MCNC: 7.8 MG/DL (ref 2.7–4.5)
PHOSPHATE SERPL-MCNC: 8.4 MG/DL
PHOSPHATE SERPL-MCNC: 8.7 MG/DL
PHOSPHORUS, FECES: <3 MG/DL
PIP: 5
PISA TR MAX VEL: 2.42 M/S
PISA TR MAX VEL: 2.47 M/S
PISA TR MAX VEL: 2.71 M/S
PLATELET # BLD AUTO: 150 K/UL (ref 150–350)
PLATELET # BLD AUTO: 161 K/UL (ref 150–350)
PLATELET # BLD AUTO: 173 K/UL (ref 150–350)
PLATELET # BLD AUTO: 175 K/UL (ref 150–350)
PLATELET # BLD AUTO: 176 K/UL (ref 150–350)
PLATELET # BLD AUTO: 176 K/UL (ref 150–350)
PLATELET # BLD AUTO: 179 K/UL (ref 150–350)
PLATELET # BLD AUTO: 180 K/UL (ref 150–350)
PLATELET # BLD AUTO: 181 K/UL (ref 150–350)
PLATELET # BLD AUTO: 183 K/UL (ref 150–350)
PLATELET # BLD AUTO: 188 K/UL
PLATELET # BLD AUTO: 190 K/UL (ref 150–350)
PLATELET # BLD AUTO: 191 K/UL (ref 150–350)
PLATELET # BLD AUTO: 192 K/UL (ref 150–350)
PLATELET # BLD AUTO: 192 K/UL (ref 150–350)
PLATELET # BLD AUTO: 193 K/UL (ref 150–350)
PLATELET # BLD AUTO: 193 K/UL (ref 150–350)
PLATELET # BLD AUTO: 195 K/UL (ref 150–350)
PLATELET # BLD AUTO: 197 K/UL (ref 150–350)
PLATELET # BLD AUTO: 204 K/UL (ref 150–350)
PLATELET # BLD AUTO: 206 K/UL (ref 150–350)
PLATELET # BLD AUTO: 210 K/UL (ref 150–350)
PLATELET # BLD AUTO: 210 K/UL (ref 150–350)
PLATELET # BLD AUTO: 212 K/UL (ref 150–350)
PLATELET # BLD AUTO: 212 K/UL (ref 150–350)
PLATELET # BLD AUTO: 214 K/UL (ref 150–350)
PLATELET # BLD AUTO: 215 K/UL (ref 150–350)
PLATELET # BLD AUTO: 216 K/UL
PLATELET # BLD AUTO: 219 K/UL (ref 150–350)
PLATELET # BLD AUTO: 222 K/UL (ref 150–350)
PLATELET # BLD AUTO: 224 K/UL (ref 150–350)
PLATELET # BLD AUTO: 230 K/UL (ref 150–350)
PLATELET # BLD AUTO: 230 K/UL (ref 150–350)
PLATELET # BLD AUTO: 232 K/UL (ref 150–350)
PLATELET # BLD AUTO: 235 K/UL (ref 150–350)
PLATELET # BLD AUTO: 237 K/UL
PLATELET # BLD AUTO: 250 K/UL (ref 150–350)
PLATELET # BLD AUTO: 250 K/UL (ref 150–350)
PLATELET # BLD AUTO: 251 K/UL (ref 150–350)
PLATELET # BLD AUTO: 253 K/UL (ref 150–350)
PLATELET # BLD AUTO: 263 K/UL (ref 150–350)
PLATELET # BLD AUTO: 263 K/UL (ref 150–350)
PLATELET # BLD AUTO: 265 K/UL
PLATELET # BLD AUTO: 270 K/UL (ref 150–350)
PLATELET # BLD AUTO: 271 K/UL
PLATELET # BLD AUTO: 274 K/UL
PLATELET # BLD AUTO: 281 K/UL (ref 150–350)
PLATELET # BLD AUTO: 282 K/UL (ref 150–350)
PLATELET # BLD AUTO: 284 K/UL
PLATELET # BLD AUTO: 293 K/UL (ref 150–350)
PLATELET # BLD AUTO: 294 K/UL (ref 150–350)
PLATELET # BLD AUTO: 295 K/UL (ref 150–350)
PLATELET # BLD AUTO: 311 K/UL
PLATELET # BLD AUTO: 313 K/UL (ref 150–350)
PLATELET # BLD AUTO: 314 K/UL (ref 150–350)
PLATELET # BLD AUTO: 314 K/UL (ref 150–350)
PLATELET # BLD AUTO: 319 K/UL (ref 150–350)
PLATELET # BLD AUTO: 320 K/UL
PLATELET # BLD AUTO: 327 K/UL (ref 150–350)
PLATELET # BLD AUTO: 331 K/UL (ref 150–350)
PLATELET # BLD AUTO: 331 K/UL (ref 150–350)
PLATELET # BLD AUTO: 332 K/UL (ref 150–350)
PLATELET # BLD AUTO: 336 K/UL (ref 150–350)
PLATELET # BLD AUTO: 343 K/UL (ref 150–350)
PLATELET # BLD AUTO: 344 K/UL (ref 150–350)
PLATELET # BLD AUTO: 351 K/UL
PLATELET # BLD AUTO: 352 K/UL (ref 150–350)
PLATELET # BLD AUTO: 361 K/UL (ref 150–350)
PLATELET # BLD AUTO: 363 K/UL
PLATELET # BLD AUTO: 366 K/UL (ref 150–350)
PLATELET # BLD AUTO: 369 K/UL (ref 150–350)
PLATELET # BLD AUTO: 375 K/UL (ref 150–350)
PLATELET # BLD AUTO: 381 K/UL
PLATELET # BLD AUTO: 381 K/UL (ref 150–350)
PLATELET # BLD AUTO: 384 K/UL (ref 150–350)
PLATELET # BLD AUTO: 393 K/UL (ref 150–350)
PLATELET # BLD AUTO: 395 K/UL (ref 150–350)
PLATELET # BLD AUTO: 399 K/UL
PLATELET # BLD AUTO: 399 K/UL
PLATELET # BLD AUTO: 402 K/UL
PLATELET # BLD AUTO: 403 K/UL
PLATELET # BLD AUTO: 406 K/UL
PLATELET # BLD AUTO: 413 K/UL (ref 150–350)
PLATELET # BLD AUTO: 414 K/UL
PLATELET # BLD AUTO: 421 K/UL (ref 150–350)
PLATELET # BLD AUTO: 425 K/UL
PLATELET # BLD AUTO: 432 K/UL (ref 150–350)
PLATELET # BLD AUTO: 437 K/UL (ref 150–350)
PLATELET # BLD AUTO: 453 K/UL
PLATELET # BLD AUTO: 464 K/UL
PLATELET # BLD AUTO: 465 K/UL
PLATELET # BLD AUTO: 470 K/UL (ref 150–350)
PLATELET # BLD AUTO: 476 K/UL
PLATELET # BLD AUTO: 491 K/UL
PLATELET # BLD AUTO: 501 K/UL (ref 150–350)
PLATELET # BLD AUTO: 513 K/UL (ref 150–350)
PLATELET # BLD AUTO: 528 K/UL
PLATELET # BLD AUTO: 534 K/UL
PLATELET # BLD AUTO: 535 K/UL
PLATELET # BLD AUTO: 537 K/UL
PLATELET # BLD AUTO: 539 K/UL
PLATELET # BLD AUTO: 568 K/UL
PLATELET # BLD AUTO: 570 K/UL
PLATELET # BLD AUTO: 575 K/UL
PLATELET # BLD AUTO: 579 K/UL (ref 150–350)
PLATELET # BLD AUTO: 586 K/UL
PLATELET # BLD AUTO: 598 K/UL
PLATELET BLD QL SMEAR: ABNORMAL
PMV BLD AUTO: 10 FL
PMV BLD AUTO: 10 FL (ref 9.2–12.9)
PMV BLD AUTO: 10.1 FL
PMV BLD AUTO: 10.1 FL
PMV BLD AUTO: 10.1 FL (ref 9.2–12.9)
PMV BLD AUTO: 10.2 FL
PMV BLD AUTO: 10.2 FL
PMV BLD AUTO: 10.2 FL (ref 9.2–12.9)
PMV BLD AUTO: 10.3 FL (ref 9.2–12.9)
PMV BLD AUTO: 10.4 FL (ref 9.2–12.9)
PMV BLD AUTO: 10.5 FL (ref 9.2–12.9)
PMV BLD AUTO: 10.6 FL (ref 9.2–12.9)
PMV BLD AUTO: 10.7 FL (ref 9.2–12.9)
PMV BLD AUTO: 10.8 FL (ref 9.2–12.9)
PMV BLD AUTO: 10.9 FL (ref 9.2–12.9)
PMV BLD AUTO: 11 FL (ref 9.2–12.9)
PMV BLD AUTO: 11.1 FL (ref 9.2–12.9)
PMV BLD AUTO: 11.2 FL (ref 9.2–12.9)
PMV BLD AUTO: 11.3 FL (ref 9.2–12.9)
PMV BLD AUTO: 11.3 FL (ref 9.2–12.9)
PMV BLD AUTO: 11.4 FL (ref 9.2–12.9)
PMV BLD AUTO: 11.5 FL (ref 9.2–12.9)
PMV BLD AUTO: 11.6 FL (ref 9.2–12.9)
PMV BLD AUTO: 11.7 FL (ref 9.2–12.9)
PMV BLD AUTO: 11.8 FL (ref 9.2–12.9)
PMV BLD AUTO: 11.8 FL (ref 9.2–12.9)
PMV BLD AUTO: 11.9 FL (ref 9.2–12.9)
PMV BLD AUTO: 11.9 FL (ref 9.2–12.9)
PMV BLD AUTO: 12 FL (ref 9.2–12.9)
PMV BLD AUTO: 12 FL (ref 9.2–12.9)
PMV BLD AUTO: 8.8 FL
PMV BLD AUTO: 9 FL
PMV BLD AUTO: 9.1 FL
PMV BLD AUTO: 9.2 FL
PMV BLD AUTO: 9.3 FL
PMV BLD AUTO: 9.4 FL
PMV BLD AUTO: 9.4 FL
PMV BLD AUTO: 9.5 FL
PMV BLD AUTO: 9.6 FL
PMV BLD AUTO: 9.7 FL
PMV BLD AUTO: 9.7 FL (ref 9.2–12.9)
PMV BLD AUTO: 9.8 FL
PMV BLD AUTO: 9.8 FL
PMV BLD AUTO: 9.8 FL (ref 9.2–12.9)
PMV BLD AUTO: 9.8 FL (ref 9.2–12.9)
PMV BLD AUTO: 9.9 FL
PMV BLD AUTO: 9.9 FL (ref 9.2–12.9)
PO2 BLDA: 118 MMHG (ref 80–100)
PO2 BLDA: 17 MMHG (ref 40–60)
PO2 BLDA: 211 MMHG (ref 80–100)
PO2 BLDA: 22 MMHG (ref 40–60)
PO2 BLDA: 24 MMHG (ref 40–60)
PO2 BLDA: 24 MMHG (ref 80–100)
PO2 BLDA: 26 MMHG (ref 40–60)
PO2 BLDA: 39 MMHG (ref 40–60)
PO2 BLDA: 42 MMHG (ref 40–60)
PO2 BLDA: 67 MMHG (ref 80–100)
PO2 BLDA: 69 MMHG (ref 80–100)
PO2 BLDA: 81 MMHG (ref 80–100)
PO2 BLDA: 88 MMHG (ref 80–100)
PO2 BLDA: 89 MMHG (ref 80–100)
POC ACTIVATED CLOTTING TIME K: 125 SEC (ref 74–137)
POC ACTIVATED CLOTTING TIME K: 147 SEC (ref 74–137)
POC BE: -10 MMOL/L
POC BE: -10 MMOL/L
POC BE: -12 MMOL/L
POC BE: -13 MMOL/L
POC BE: -14 MMOL/L
POC BE: -16 MMOL/L
POC BE: -3 MMOL/L
POC BE: -7 MMOL/L
POC BE: 1 MMOL/L
POC IONIZED CALCIUM: 0.96 MMOL/L (ref 1.06–1.42)
POC IONIZED CALCIUM: 1 MMOL/L (ref 1.06–1.42)
POC IONIZED CALCIUM: 1.23 MMOL/L (ref 1.06–1.42)
POC IONIZED CALCIUM: 1.39 MMOL/L (ref 1.06–1.42)
POC SATURATED O2: 100 % (ref 95–100)
POC SATURATED O2: 21 % (ref 95–100)
POC SATURATED O2: 31 % (ref 95–100)
POC SATURATED O2: 36 % (ref 95–100)
POC SATURATED O2: 40 % (ref 95–100)
POC SATURATED O2: 44 % (ref 95–100)
POC SATURATED O2: 57 % (ref 95–100)
POC SATURATED O2: 68 % (ref 95–100)
POC SATURATED O2: 90 % (ref 95–100)
POC SATURATED O2: 91 % (ref 95–100)
POC SATURATED O2: 94 % (ref 95–100)
POC SATURATED O2: 94 % (ref 95–100)
POC SATURATED O2: 95 % (ref 95–100)
POC SATURATED O2: 98 % (ref 95–100)
POC TCO2 (MEASURED): 24 MMOL/L (ref 23–29)
POC TCO2 (MEASURED): 31 MMOL/L (ref 23–29)
POC TCO2: 13 MMOL/L (ref 23–27)
POC TCO2: 15 MMOL/L (ref 23–27)
POC TCO2: 16 MMOL/L (ref 23–27)
POC TCO2: 16 MMOL/L (ref 23–27)
POC TCO2: 16 MMOL/L (ref 24–29)
POC TCO2: 18 MMOL/L (ref 23–27)
POC TCO2: 18 MMOL/L (ref 24–29)
POC TCO2: 18 MMOL/L (ref 24–29)
POC TCO2: 21 MMOL/L (ref 23–27)
POC TCO2: 23 MMOL/L (ref 24–29)
POC TCO2: 24 MMOL/L (ref 23–27)
POC TCO2: 24 MMOL/L (ref 24–29)
POC TCO2: 25 MMOL/L (ref 24–29)
POC TCO2: 26 MMOL/L (ref 23–27)
POCT GLUCOSE: 102 MG/DL (ref 70–110)
POCT GLUCOSE: 103 MG/DL (ref 70–110)
POCT GLUCOSE: 105 MG/DL (ref 70–110)
POCT GLUCOSE: 106 MG/DL (ref 70–110)
POCT GLUCOSE: 107 MG/DL (ref 70–110)
POCT GLUCOSE: 107 MG/DL (ref 70–110)
POCT GLUCOSE: 108 MG/DL (ref 70–110)
POCT GLUCOSE: 110 MG/DL (ref 70–110)
POCT GLUCOSE: 111 MG/DL (ref 70–110)
POCT GLUCOSE: 112 MG/DL (ref 70–110)
POCT GLUCOSE: 114 MG/DL (ref 70–110)
POCT GLUCOSE: 114 MG/DL (ref 70–110)
POCT GLUCOSE: 115 MG/DL (ref 70–110)
POCT GLUCOSE: 115 MG/DL (ref 70–110)
POCT GLUCOSE: 117 MG/DL (ref 70–110)
POCT GLUCOSE: 117 MG/DL (ref 70–110)
POCT GLUCOSE: 118 MG/DL (ref 70–110)
POCT GLUCOSE: 119 MG/DL (ref 70–110)
POCT GLUCOSE: 120 MG/DL (ref 70–110)
POCT GLUCOSE: 121 MG/DL (ref 70–110)
POCT GLUCOSE: 122 MG/DL (ref 70–110)
POCT GLUCOSE: 123 MG/DL (ref 70–110)
POCT GLUCOSE: 124 MG/DL (ref 70–110)
POCT GLUCOSE: 125 MG/DL (ref 70–110)
POCT GLUCOSE: 125 MG/DL (ref 70–110)
POCT GLUCOSE: 126 MG/DL (ref 70–110)
POCT GLUCOSE: 126 MG/DL (ref 70–110)
POCT GLUCOSE: 127 MG/DL (ref 70–110)
POCT GLUCOSE: 129 MG/DL (ref 70–110)
POCT GLUCOSE: 130 MG/DL (ref 70–110)
POCT GLUCOSE: 131 MG/DL (ref 70–110)
POCT GLUCOSE: 132 MG/DL (ref 70–110)
POCT GLUCOSE: 133 MG/DL (ref 70–110)
POCT GLUCOSE: 133 MG/DL (ref 70–110)
POCT GLUCOSE: 134 MG/DL (ref 70–110)
POCT GLUCOSE: 135 MG/DL (ref 70–110)
POCT GLUCOSE: 137 MG/DL (ref 70–110)
POCT GLUCOSE: 138 MG/DL (ref 70–110)
POCT GLUCOSE: 138 MG/DL (ref 70–110)
POCT GLUCOSE: 139 MG/DL (ref 70–110)
POCT GLUCOSE: 140 MG/DL (ref 70–110)
POCT GLUCOSE: 141 MG/DL (ref 70–110)
POCT GLUCOSE: 141 MG/DL (ref 70–110)
POCT GLUCOSE: 142 MG/DL (ref 70–110)
POCT GLUCOSE: 142 MG/DL (ref 70–110)
POCT GLUCOSE: 143 MG/DL (ref 70–110)
POCT GLUCOSE: 144 MG/DL (ref 70–110)
POCT GLUCOSE: 144 MG/DL (ref 70–110)
POCT GLUCOSE: 145 MG/DL (ref 70–110)
POCT GLUCOSE: 146 MG/DL (ref 70–110)
POCT GLUCOSE: 147 MG/DL (ref 70–110)
POCT GLUCOSE: 148 MG/DL (ref 70–110)
POCT GLUCOSE: 149 MG/DL (ref 70–110)
POCT GLUCOSE: 150 MG/DL (ref 70–110)
POCT GLUCOSE: 151 MG/DL (ref 70–110)
POCT GLUCOSE: 151 MG/DL (ref 70–110)
POCT GLUCOSE: 152 MG/DL (ref 70–110)
POCT GLUCOSE: 153 MG/DL (ref 70–110)
POCT GLUCOSE: 154 MG/DL (ref 70–110)
POCT GLUCOSE: 155 MG/DL (ref 70–110)
POCT GLUCOSE: 156 MG/DL (ref 70–110)
POCT GLUCOSE: 157 MG/DL (ref 70–110)
POCT GLUCOSE: 159 MG/DL (ref 70–110)
POCT GLUCOSE: 159 MG/DL (ref 70–110)
POCT GLUCOSE: 161 MG/DL (ref 70–110)
POCT GLUCOSE: 162 MG/DL (ref 70–110)
POCT GLUCOSE: 163 MG/DL (ref 70–110)
POCT GLUCOSE: 164 MG/DL (ref 70–110)
POCT GLUCOSE: 165 MG/DL (ref 70–110)
POCT GLUCOSE: 166 MG/DL (ref 70–110)
POCT GLUCOSE: 167 MG/DL (ref 70–110)
POCT GLUCOSE: 168 MG/DL (ref 70–110)
POCT GLUCOSE: 168 MG/DL (ref 70–110)
POCT GLUCOSE: 169 MG/DL (ref 70–110)
POCT GLUCOSE: 170 MG/DL (ref 70–110)
POCT GLUCOSE: 171 MG/DL (ref 70–110)
POCT GLUCOSE: 172 MG/DL (ref 70–110)
POCT GLUCOSE: 173 MG/DL (ref 70–110)
POCT GLUCOSE: 174 MG/DL (ref 70–110)
POCT GLUCOSE: 175 MG/DL (ref 70–110)
POCT GLUCOSE: 175 MG/DL (ref 70–110)
POCT GLUCOSE: 176 MG/DL (ref 70–110)
POCT GLUCOSE: 177 MG/DL (ref 70–110)
POCT GLUCOSE: 178 MG/DL (ref 70–110)
POCT GLUCOSE: 178 MG/DL (ref 70–110)
POCT GLUCOSE: 179 MG/DL (ref 70–110)
POCT GLUCOSE: 179 MG/DL (ref 70–110)
POCT GLUCOSE: 180 MG/DL (ref 70–110)
POCT GLUCOSE: 180 MG/DL (ref 70–110)
POCT GLUCOSE: 181 MG/DL (ref 70–110)
POCT GLUCOSE: 181 MG/DL (ref 70–110)
POCT GLUCOSE: 182 MG/DL (ref 70–110)
POCT GLUCOSE: 183 MG/DL (ref 70–110)
POCT GLUCOSE: 183 MG/DL (ref 70–110)
POCT GLUCOSE: 184 MG/DL (ref 70–110)
POCT GLUCOSE: 185 MG/DL (ref 70–110)
POCT GLUCOSE: 185 MG/DL (ref 70–110)
POCT GLUCOSE: 186 MG/DL (ref 70–110)
POCT GLUCOSE: 186 MG/DL (ref 70–110)
POCT GLUCOSE: 187 MG/DL (ref 70–110)
POCT GLUCOSE: 188 MG/DL (ref 70–110)
POCT GLUCOSE: 189 MG/DL (ref 70–110)
POCT GLUCOSE: 191 MG/DL (ref 70–110)
POCT GLUCOSE: 192 MG/DL (ref 70–110)
POCT GLUCOSE: 192 MG/DL (ref 70–110)
POCT GLUCOSE: 193 MG/DL (ref 70–110)
POCT GLUCOSE: 194 MG/DL (ref 70–110)
POCT GLUCOSE: 195 MG/DL (ref 70–110)
POCT GLUCOSE: 196 MG/DL (ref 70–110)
POCT GLUCOSE: 197 MG/DL (ref 70–110)
POCT GLUCOSE: 198 MG/DL (ref 70–110)
POCT GLUCOSE: 200 MG/DL (ref 70–110)
POCT GLUCOSE: 202 MG/DL (ref 70–110)
POCT GLUCOSE: 202 MG/DL (ref 70–110)
POCT GLUCOSE: 203 MG/DL (ref 70–110)
POCT GLUCOSE: 204 MG/DL (ref 70–110)
POCT GLUCOSE: 205 MG/DL (ref 70–110)
POCT GLUCOSE: 206 MG/DL (ref 70–110)
POCT GLUCOSE: 206 MG/DL (ref 70–110)
POCT GLUCOSE: 207 MG/DL (ref 70–110)
POCT GLUCOSE: 208 MG/DL (ref 70–110)
POCT GLUCOSE: 208 MG/DL (ref 70–110)
POCT GLUCOSE: 209 MG/DL (ref 70–110)
POCT GLUCOSE: 210 MG/DL (ref 70–110)
POCT GLUCOSE: 211 MG/DL (ref 70–110)
POCT GLUCOSE: 211 MG/DL (ref 70–110)
POCT GLUCOSE: 212 MG/DL (ref 70–110)
POCT GLUCOSE: 213 MG/DL (ref 70–110)
POCT GLUCOSE: 213 MG/DL (ref 70–110)
POCT GLUCOSE: 214 MG/DL (ref 70–110)
POCT GLUCOSE: 215 MG/DL (ref 70–110)
POCT GLUCOSE: 216 MG/DL (ref 70–110)
POCT GLUCOSE: 218 MG/DL (ref 70–110)
POCT GLUCOSE: 218 MG/DL (ref 70–110)
POCT GLUCOSE: 219 MG/DL (ref 70–110)
POCT GLUCOSE: 219 MG/DL (ref 70–110)
POCT GLUCOSE: 221 MG/DL (ref 70–110)
POCT GLUCOSE: 222 MG/DL (ref 70–110)
POCT GLUCOSE: 223 MG/DL (ref 70–110)
POCT GLUCOSE: 225 MG/DL (ref 70–110)
POCT GLUCOSE: 226 MG/DL (ref 70–110)
POCT GLUCOSE: 227 MG/DL (ref 70–110)
POCT GLUCOSE: 227 MG/DL (ref 70–110)
POCT GLUCOSE: 230 MG/DL (ref 70–110)
POCT GLUCOSE: 230 MG/DL (ref 70–110)
POCT GLUCOSE: 231 MG/DL (ref 70–110)
POCT GLUCOSE: 232 MG/DL (ref 70–110)
POCT GLUCOSE: 234 MG/DL (ref 70–110)
POCT GLUCOSE: 235 MG/DL (ref 70–110)
POCT GLUCOSE: 236 MG/DL (ref 70–110)
POCT GLUCOSE: 236 MG/DL (ref 70–110)
POCT GLUCOSE: 237 MG/DL (ref 70–110)
POCT GLUCOSE: 239 MG/DL (ref 70–110)
POCT GLUCOSE: 240 MG/DL (ref 70–110)
POCT GLUCOSE: 241 MG/DL (ref 70–110)
POCT GLUCOSE: 242 MG/DL (ref 70–110)
POCT GLUCOSE: 244 MG/DL (ref 70–110)
POCT GLUCOSE: 247 MG/DL (ref 70–110)
POCT GLUCOSE: 249 MG/DL (ref 70–110)
POCT GLUCOSE: 249 MG/DL (ref 70–110)
POCT GLUCOSE: 251 MG/DL (ref 70–110)
POCT GLUCOSE: 251 MG/DL (ref 70–110)
POCT GLUCOSE: 252 MG/DL (ref 70–110)
POCT GLUCOSE: 255 MG/DL (ref 70–110)
POCT GLUCOSE: 256 MG/DL (ref 70–110)
POCT GLUCOSE: 258 MG/DL (ref 70–110)
POCT GLUCOSE: 265 MG/DL (ref 70–110)
POCT GLUCOSE: 266 MG/DL (ref 70–110)
POCT GLUCOSE: 271 MG/DL (ref 70–110)
POCT GLUCOSE: 272 MG/DL (ref 70–110)
POCT GLUCOSE: 275 MG/DL (ref 70–110)
POCT GLUCOSE: 277 MG/DL (ref 70–110)
POCT GLUCOSE: 278 MG/DL (ref 70–110)
POCT GLUCOSE: 281 MG/DL (ref 70–110)
POCT GLUCOSE: 282 MG/DL (ref 70–110)
POCT GLUCOSE: 292 MG/DL (ref 70–110)
POCT GLUCOSE: 298 MG/DL (ref 70–110)
POCT GLUCOSE: 336 MG/DL (ref 70–110)
POCT GLUCOSE: 35 MG/DL (ref 70–110)
POCT GLUCOSE: 77 MG/DL (ref 70–110)
POCT GLUCOSE: 81 MG/DL (ref 70–110)
POCT GLUCOSE: 82 MG/DL (ref 70–110)
POCT GLUCOSE: 85 MG/DL (ref 70–110)
POCT GLUCOSE: 89 MG/DL (ref 70–110)
POCT GLUCOSE: 90 MG/DL (ref 70–110)
POCT GLUCOSE: 95 MG/DL (ref 70–110)
POCT GLUCOSE: 97 MG/DL (ref 70–110)
POCT GLUCOSE: 97 MG/DL (ref 70–110)
POCT GLUCOSE: 98 MG/DL (ref 70–110)
POCT GLUCOSE: 99 MG/DL (ref 70–110)
POIKILOCYTOSIS BLD QL SMEAR: ABNORMAL
POIKILOCYTOSIS BLD QL SMEAR: ABNORMAL
POIKILOCYTOSIS BLD QL SMEAR: SLIGHT
POLYCHROMASIA BLD QL SMEAR: ABNORMAL
POTASSIUM BLD-SCNC: 3.7 MMOL/L (ref 3.5–5.1)
POTASSIUM BLD-SCNC: 4.1 MMOL/L (ref 3.5–5.1)
POTASSIUM BLD-SCNC: 4.6 MMOL/L (ref 3.5–5.1)
POTASSIUM BLD-SCNC: 5.1 MMOL/L (ref 3.5–5.1)
POTASSIUM FLD-SCNC: 15 MMOL/L
POTASSIUM SERPL-SCNC: 3.1 MMOL/L
POTASSIUM SERPL-SCNC: 3.1 MMOL/L
POTASSIUM SERPL-SCNC: 3.2 MMOL/L
POTASSIUM SERPL-SCNC: 3.3 MMOL/L
POTASSIUM SERPL-SCNC: 3.3 MMOL/L
POTASSIUM SERPL-SCNC: 3.4 MMOL/L
POTASSIUM SERPL-SCNC: 3.4 MMOL/L (ref 3.5–5.1)
POTASSIUM SERPL-SCNC: 3.5 MMOL/L
POTASSIUM SERPL-SCNC: 3.5 MMOL/L
POTASSIUM SERPL-SCNC: 3.5 MMOL/L (ref 3.5–5.1)
POTASSIUM SERPL-SCNC: 3.6 MMOL/L
POTASSIUM SERPL-SCNC: 3.7 MMOL/L
POTASSIUM SERPL-SCNC: 3.7 MMOL/L (ref 3.5–5.1)
POTASSIUM SERPL-SCNC: 3.8 MMOL/L
POTASSIUM SERPL-SCNC: 3.8 MMOL/L (ref 3.5–5.1)
POTASSIUM SERPL-SCNC: 3.9 MMOL/L
POTASSIUM SERPL-SCNC: 3.9 MMOL/L
POTASSIUM SERPL-SCNC: 3.9 MMOL/L (ref 3.5–5.1)
POTASSIUM SERPL-SCNC: 4 MMOL/L
POTASSIUM SERPL-SCNC: 4 MMOL/L (ref 3.5–5.1)
POTASSIUM SERPL-SCNC: 4.1 MMOL/L
POTASSIUM SERPL-SCNC: 4.1 MMOL/L
POTASSIUM SERPL-SCNC: 4.1 MMOL/L (ref 3.5–5.1)
POTASSIUM SERPL-SCNC: 4.2 MMOL/L
POTASSIUM SERPL-SCNC: 4.2 MMOL/L (ref 3.5–5.1)
POTASSIUM SERPL-SCNC: 4.3 MMOL/L
POTASSIUM SERPL-SCNC: 4.3 MMOL/L (ref 3.5–5.1)
POTASSIUM SERPL-SCNC: 4.4 MMOL/L
POTASSIUM SERPL-SCNC: 4.4 MMOL/L (ref 3.5–5.1)
POTASSIUM SERPL-SCNC: 4.5 MMOL/L
POTASSIUM SERPL-SCNC: 4.5 MMOL/L (ref 3.5–5.1)
POTASSIUM SERPL-SCNC: 4.6 MMOL/L
POTASSIUM SERPL-SCNC: 4.6 MMOL/L
POTASSIUM SERPL-SCNC: 4.6 MMOL/L (ref 3.5–5.1)
POTASSIUM SERPL-SCNC: 4.7 MMOL/L (ref 3.5–5.1)
POTASSIUM SERPL-SCNC: 4.8 MMOL/L (ref 3.5–5.1)
POTASSIUM SERPL-SCNC: 4.9 MMOL/L (ref 3.5–5.1)
POTASSIUM SERPL-SCNC: 5 MMOL/L
POTASSIUM SERPL-SCNC: 5 MMOL/L (ref 3.5–5.1)
POTASSIUM SERPL-SCNC: 5.1 MMOL/L (ref 3.5–5.1)
POTASSIUM SERPL-SCNC: 5.2 MMOL/L (ref 3.5–5.1)
POTASSIUM SERPL-SCNC: 5.3 MMOL/L (ref 3.5–5.1)
POTASSIUM SERPL-SCNC: 5.4 MMOL/L (ref 3.5–5.1)
PREALB SERPL-MCNC: 16 MG/DL (ref 20–43)
PROCALCITONIN SERPL IA-MCNC: 1.18 NG/ML
PROCALCITONIN SERPL IA-MCNC: 1.71 NG/ML
PROT SERPL-MCNC: 4.9 G/DL
PROT SERPL-MCNC: 5.1 G/DL
PROT SERPL-MCNC: 5.3 G/DL
PROT SERPL-MCNC: 5.4 G/DL
PROT SERPL-MCNC: 5.4 G/DL (ref 6–8.4)
PROT SERPL-MCNC: 5.4 G/DL (ref 6–8.4)
PROT SERPL-MCNC: 5.5 G/DL
PROT SERPL-MCNC: 5.5 G/DL
PROT SERPL-MCNC: 5.9 G/DL (ref 6–8.4)
PROT SERPL-MCNC: 6 G/DL (ref 6–8.4)
PROT SERPL-MCNC: 6.1 G/DL
PROT SERPL-MCNC: 6.2 G/DL
PROT SERPL-MCNC: 6.2 G/DL (ref 6–8.4)
PROT SERPL-MCNC: 6.3 G/DL
PROT SERPL-MCNC: 6.4 G/DL
PROT SERPL-MCNC: 6.4 G/DL (ref 6–8.4)
PROT SERPL-MCNC: 6.4 G/DL (ref 6–8.4)
PROT SERPL-MCNC: 6.5 G/DL
PROT SERPL-MCNC: 6.5 G/DL
PROT SERPL-MCNC: 6.5 G/DL (ref 6–8.4)
PROT SERPL-MCNC: 6.6 G/DL
PROT SERPL-MCNC: 6.6 G/DL (ref 6–8.4)
PROT SERPL-MCNC: 6.6 G/DL (ref 6–8.4)
PROT SERPL-MCNC: 6.7 G/DL
PROT SERPL-MCNC: 6.8 G/DL
PROT SERPL-MCNC: 6.8 G/DL
PROT SERPL-MCNC: 6.8 G/DL (ref 6–8.4)
PROT SERPL-MCNC: 6.9 G/DL
PROT SERPL-MCNC: 7 G/DL
PROT SERPL-MCNC: 7 G/DL
PROT SERPL-MCNC: 7 G/DL (ref 6–8.4)
PROT SERPL-MCNC: 7 G/DL (ref 6–8.4)
PROT SERPL-MCNC: 7.1 G/DL
PROT SERPL-MCNC: 7.2 G/DL
PROT SERPL-MCNC: 7.2 G/DL (ref 6–8.4)
PROT SERPL-MCNC: 7.5 G/DL (ref 6–8.4)
PROT SERPL-MCNC: 7.6 G/DL (ref 6–8.4)
PROT SERPL-MCNC: 7.7 G/DL (ref 6–8.4)
PROT SERPL-MCNC: 7.8 G/DL
PROT SERPL-MCNC: 7.8 G/DL (ref 6–8.4)
PROT SERPL-MCNC: 8.2 G/DL (ref 6–8.4)
PROT SERPL-MCNC: 8.5 G/DL
PROT SERPL-MCNC: 8.5 G/DL (ref 6–8.4)
PROT SERPL-MCNC: 8.6 G/DL (ref 6–8.4)
PROT SERPL-MCNC: 8.6 G/DL (ref 6–8.4)
PROT SERPL-MCNC: 8.8 G/DL (ref 6–8.4)
PROT SERPL-MCNC: 9 G/DL
PROT SERPL-MCNC: 9.6 G/DL
PROTHROMBIN TIME: 10.4 SEC (ref 9–12.5)
PROTHROMBIN TIME: 10.6 SEC (ref 9–12.5)
PROTHROMBIN TIME: 10.6 SEC (ref 9–12.5)
PROTHROMBIN TIME: 10.7 SEC (ref 9–12.5)
PROTHROMBIN TIME: 10.7 SEC (ref 9–12.5)
PROTHROMBIN TIME: 11.2 SEC (ref 9–12.5)
PROTHROMBIN TIME: 11.8 SEC
PROTHROMBIN TIME: 11.8 SEC (ref 9–12.5)
PROTHROMBIN TIME: 11.8 SEC (ref 9–12.5)
PROTHROMBIN TIME: 11.9 SEC (ref 9–12.5)
PROTHROMBIN TIME: 12.6 SEC
PROTHROMBIN TIME: 13.7 SEC
PROTHROMBIN TIME: 13.8 SEC
PROTHROMBIN TIME: 14.6 SEC
PROTHROMBIN TIME: 15.3 SEC
PROTHROMBIN TIME: 15.8 SEC
PROTHROMBIN TIME: 16.4 SEC
PROTHROMBIN TIME: 17.9 SEC
PROTHROMBIN TIME: 18.3 SEC
PULM VEIN S/D RATIO: 1.1
PV PEAK D VEL: 0.29 M/S
PV PEAK S VEL: 0.32 M/S
PV PEAK VELOCITY: 1.14 CM/S
RA MAJOR: 3.78 CM
RA MAJOR: 4.12 CM
RA MAJOR: 4.73 CM
RA PRESSURE: 3 MMHG
RA WIDTH: 2.44 CM
RA WIDTH: 2.73 CM
RA WIDTH: 3.54 CM
RBC # BLD AUTO: 2.03 M/UL
RBC # BLD AUTO: 2.19 M/UL
RBC # BLD AUTO: 2.23 M/UL (ref 4–5.4)
RBC # BLD AUTO: 2.24 M/UL (ref 4–5.4)
RBC # BLD AUTO: 2.25 M/UL (ref 4–5.4)
RBC # BLD AUTO: 2.3 M/UL (ref 4–5.4)
RBC # BLD AUTO: 2.32 M/UL (ref 4–5.4)
RBC # BLD AUTO: 2.32 M/UL (ref 4–5.4)
RBC # BLD AUTO: 2.33 M/UL (ref 4–5.4)
RBC # BLD AUTO: 2.34 M/UL (ref 4–5.4)
RBC # BLD AUTO: 2.35 M/UL (ref 4–5.4)
RBC # BLD AUTO: 2.35 M/UL (ref 4–5.4)
RBC # BLD AUTO: 2.39 M/UL (ref 4–5.4)
RBC # BLD AUTO: 2.45 M/UL (ref 4–5.4)
RBC # BLD AUTO: 2.46 M/UL (ref 4–5.4)
RBC # BLD AUTO: 2.48 M/UL (ref 4–5.4)
RBC # BLD AUTO: 2.49 M/UL (ref 4–5.4)
RBC # BLD AUTO: 2.54 M/UL (ref 4–5.4)
RBC # BLD AUTO: 2.58 M/UL (ref 4–5.4)
RBC # BLD AUTO: 2.59 M/UL
RBC # BLD AUTO: 2.59 M/UL (ref 4–5.4)
RBC # BLD AUTO: 2.6 M/UL (ref 4–5.4)
RBC # BLD AUTO: 2.6 M/UL (ref 4–5.4)
RBC # BLD AUTO: 2.61 M/UL (ref 4–5.4)
RBC # BLD AUTO: 2.61 M/UL (ref 4–5.4)
RBC # BLD AUTO: 2.62 M/UL (ref 4–5.4)
RBC # BLD AUTO: 2.65 M/UL (ref 4–5.4)
RBC # BLD AUTO: 2.65 M/UL (ref 4–5.4)
RBC # BLD AUTO: 2.66 M/UL (ref 4–5.4)
RBC # BLD AUTO: 2.66 M/UL (ref 4–5.4)
RBC # BLD AUTO: 2.67 M/UL (ref 4–5.4)
RBC # BLD AUTO: 2.71 M/UL
RBC # BLD AUTO: 2.72 M/UL (ref 4–5.4)
RBC # BLD AUTO: 2.74 M/UL
RBC # BLD AUTO: 2.74 M/UL (ref 4–5.4)
RBC # BLD AUTO: 2.74 M/UL (ref 4–5.4)
RBC # BLD AUTO: 2.76 M/UL (ref 4–5.4)
RBC # BLD AUTO: 2.76 M/UL (ref 4–5.4)
RBC # BLD AUTO: 2.77 M/UL
RBC # BLD AUTO: 2.79 M/UL
RBC # BLD AUTO: 2.79 M/UL
RBC # BLD AUTO: 2.8 M/UL (ref 4–5.4)
RBC # BLD AUTO: 2.8 M/UL (ref 4–5.4)
RBC # BLD AUTO: 2.82 M/UL
RBC # BLD AUTO: 2.82 M/UL (ref 4–5.4)
RBC # BLD AUTO: 2.83 M/UL
RBC # BLD AUTO: 2.83 M/UL
RBC # BLD AUTO: 2.83 M/UL (ref 4–5.4)
RBC # BLD AUTO: 2.84 M/UL (ref 4–5.4)
RBC # BLD AUTO: 2.84 M/UL (ref 4–5.4)
RBC # BLD AUTO: 2.85 M/UL (ref 4–5.4)
RBC # BLD AUTO: 2.85 M/UL (ref 4–5.4)
RBC # BLD AUTO: 2.86 M/UL (ref 4–5.4)
RBC # BLD AUTO: 2.87 M/UL (ref 4–5.4)
RBC # BLD AUTO: 2.87 M/UL (ref 4–5.4)
RBC # BLD AUTO: 2.9 M/UL (ref 4–5.4)
RBC # BLD AUTO: 2.91 M/UL
RBC # BLD AUTO: 2.91 M/UL (ref 4–5.4)
RBC # BLD AUTO: 2.91 M/UL (ref 4–5.4)
RBC # BLD AUTO: 2.93 M/UL
RBC # BLD AUTO: 2.93 M/UL
RBC # BLD AUTO: 2.93 M/UL (ref 4–5.4)
RBC # BLD AUTO: 2.93 M/UL (ref 4–5.4)
RBC # BLD AUTO: 2.94 M/UL
RBC # BLD AUTO: 2.94 M/UL (ref 4–5.4)
RBC # BLD AUTO: 2.95 M/UL (ref 4–5.4)
RBC # BLD AUTO: 2.97 M/UL (ref 4–5.4)
RBC # BLD AUTO: 2.98 M/UL (ref 4–5.4)
RBC # BLD AUTO: 2.99 M/UL (ref 4–5.4)
RBC # BLD AUTO: 3 M/UL (ref 4–5.4)
RBC # BLD AUTO: 3.01 M/UL
RBC # BLD AUTO: 3.01 M/UL (ref 4–5.4)
RBC # BLD AUTO: 3.02 M/UL
RBC # BLD AUTO: 3.02 M/UL (ref 4–5.4)
RBC # BLD AUTO: 3.03 M/UL
RBC # BLD AUTO: 3.05 M/UL (ref 4–5.4)
RBC # BLD AUTO: 3.05 M/UL (ref 4–5.4)
RBC # BLD AUTO: 3.08 M/UL
RBC # BLD AUTO: 3.08 M/UL (ref 4–5.4)
RBC # BLD AUTO: 3.1 M/UL (ref 4–5.4)
RBC # BLD AUTO: 3.16 M/UL (ref 4–5.4)
RBC # BLD AUTO: 3.19 M/UL (ref 4–5.4)
RBC # BLD AUTO: 3.19 M/UL (ref 4–5.4)
RBC # BLD AUTO: 3.24 M/UL
RBC # BLD AUTO: 3.26 M/UL (ref 4–5.4)
RBC # BLD AUTO: 3.26 M/UL (ref 4–5.4)
RBC # BLD AUTO: 3.29 M/UL
RBC # BLD AUTO: 3.31 M/UL
RBC # BLD AUTO: 3.31 M/UL
RBC # BLD AUTO: 3.32 M/UL (ref 4–5.4)
RBC # BLD AUTO: 3.33 M/UL (ref 4–5.4)
RBC # BLD AUTO: 3.34 M/UL
RBC # BLD AUTO: 3.37 M/UL
RBC # BLD AUTO: 3.37 M/UL (ref 4–5.4)
RBC # BLD AUTO: 3.44 M/UL
RBC # BLD AUTO: 3.61 M/UL
RBC # BLD AUTO: 3.63 M/UL (ref 4–5.4)
RBC # BLD AUTO: 3.69 M/UL
RBC # BLD AUTO: 3.69 M/UL
RBC # BLD AUTO: 3.83 M/UL
RBC # BLD AUTO: 3.93 M/UL
RBC # BLD AUTO: 3.99 M/UL
RIGHT VENTRICULAR END-DIASTOLIC DIMENSION: 2.57 CM
RIGHT VENTRICULAR END-DIASTOLIC DIMENSION: 2.63 CM
RIGHT VENTRICULAR END-DIASTOLIC DIMENSION: 2.73 CM
RIGHT VENTRICULAR END-DIASTOLIC DIMENSION: 3.89 CM
RV TISSUE DOPPLER FREE WALL SYSTOLIC VELOCITY 1 (APICAL 4 CHAMBER VIEW): 5.61 M/S
RV TISSUE DOPPLER FREE WALL SYSTOLIC VELOCITY 1 (APICAL 4 CHAMBER VIEW): 8.94 M/S
SAMPLE: ABNORMAL
SAMPLE: NORMAL
SATURATED IRON: 15 %
SATURATED IRON: 17 % (ref 20–50)
SCHISTOCYTES BLD QL SMEAR: ABNORMAL
SCHISTOCYTES BLD QL SMEAR: PRESENT
SCHISTOCYTES BLD QL SMEAR: PRESENT
SINUS: 2.57 CM
SINUS: 2.77 CM
SINUS: 2.79 CM
SITE: ABNORMAL
SODIUM BLD-SCNC: 131 MMOL/L (ref 136–145)
SODIUM BLD-SCNC: 139 MMOL/L (ref 136–145)
SODIUM BLD-SCNC: 140 MMOL/L (ref 136–145)
SODIUM BLD-SCNC: 141 MMOL/L (ref 136–145)
SODIUM SERPL-SCNC: 126 MMOL/L
SODIUM SERPL-SCNC: 128 MMOL/L
SODIUM SERPL-SCNC: 129 MMOL/L
SODIUM SERPL-SCNC: 129 MMOL/L (ref 136–145)
SODIUM SERPL-SCNC: 129 MMOL/L (ref 136–145)
SODIUM SERPL-SCNC: 130 MMOL/L
SODIUM SERPL-SCNC: 130 MMOL/L (ref 136–145)
SODIUM SERPL-SCNC: 130 MMOL/L (ref 136–145)
SODIUM SERPL-SCNC: 131 MMOL/L
SODIUM SERPL-SCNC: 131 MMOL/L (ref 136–145)
SODIUM SERPL-SCNC: 132 MMOL/L
SODIUM SERPL-SCNC: 132 MMOL/L (ref 136–145)
SODIUM SERPL-SCNC: 133 MMOL/L
SODIUM SERPL-SCNC: 133 MMOL/L (ref 136–145)
SODIUM SERPL-SCNC: 133 MMOL/L (ref 136–145)
SODIUM SERPL-SCNC: 134 MMOL/L
SODIUM SERPL-SCNC: 134 MMOL/L (ref 136–145)
SODIUM SERPL-SCNC: 135 MMOL/L
SODIUM SERPL-SCNC: 135 MMOL/L (ref 136–145)
SODIUM SERPL-SCNC: 136 MMOL/L
SODIUM SERPL-SCNC: 136 MMOL/L (ref 136–145)
SODIUM SERPL-SCNC: 137 MMOL/L
SODIUM SERPL-SCNC: 137 MMOL/L
SODIUM SERPL-SCNC: 137 MMOL/L (ref 136–145)
SODIUM SERPL-SCNC: 138 MMOL/L (ref 136–145)
SODIUM SERPL-SCNC: 139 MMOL/L (ref 136–145)
SODIUM SERPL-SCNC: 140 MMOL/L (ref 136–145)
SODIUM SERPL-SCNC: 141 MMOL/L (ref 136–145)
SODIUM SERPL-SCNC: 142 MMOL/L (ref 136–145)
SODIUM SERPL-SCNC: 143 MMOL/L (ref 136–145)
SODIUM STL-SCNC: 113 MMOL/L
SP02: 100
SP02: 91
SP02: 94
SPHEROCYTES BLD QL SMEAR: ABNORMAL
SPHEROCYTES BLD QL SMEAR: ABNORMAL
STJ: 2.21 CM
STJ: 2.26 CM
STJ: 3.21 CM
T4 FREE SERPL-MCNC: 1.03 NG/DL (ref 0.71–1.51)
T4 FREE SERPL-MCNC: 1.08 NG/DL (ref 0.71–1.51)
TARGETS BLD QL SMEAR: ABNORMAL
TDI LATERAL: 0.05
TDI LATERAL: 0.05 M/S
TDI LATERAL: 0.08
TDI LATERAL: 0.1
TDI SEPTAL: 0.05 M/S
TDI SEPTAL: 0.06
TDI SEPTAL: 0.1
TDI: 0.05 M/S
TDI: 0.06
TDI: 0.1
TOTAL IRON BINDING CAPACITY: 130 UG/DL (ref 250–450)
TOTAL IRON BINDING CAPACITY: 135 UG/DL
TOXIC GRANULES BLD QL SMEAR: PRESENT
TR MAX PG: 23.43 MMHG
TR MAX PG: 24.4 MMHG
TR MAX PG: 29.38 MMHG
TRANS ERYTHROCYTES VOL PATIENT: NORMAL ML
TRANSFERRIN SERPL-MCNC: 196 MG/DL (ref 200–375)
TRANSFERRIN SERPL-MCNC: 88 MG/DL (ref 200–375)
TRANSFERRIN SERPL-MCNC: 91 MG/DL
TRICUSPID ANNULAR PLANE SYSTOLIC EXCURSION: 1.15 CM
TRICUSPID ANNULAR PLANE SYSTOLIC EXCURSION: 1.23 CM
TROPONIN I SERPL DL<=0.01 NG/ML-MCNC: 0.01 NG/ML (ref 0–0.03)
TROPONIN I SERPL DL<=0.01 NG/ML-MCNC: 0.12 NG/ML
TROPONIN I SERPL DL<=0.01 NG/ML-MCNC: 0.21 NG/ML
TROPONIN I SERPL DL<=0.01 NG/ML-MCNC: 0.28 NG/ML (ref 0–0.03)
TROPONIN I SERPL DL<=0.01 NG/ML-MCNC: 0.32 NG/ML
TROPONIN I SERPL DL<=0.01 NG/ML-MCNC: 0.44 NG/ML (ref 0–0.03)
TROPONIN I SERPL DL<=0.01 NG/ML-MCNC: 0.95 NG/ML (ref 0–0.03)
TROPONIN I SERPL DL<=0.01 NG/ML-MCNC: 1.09 NG/ML (ref 0–0.03)
TROPONIN I SERPL DL<=0.01 NG/ML-MCNC: 10.12 NG/ML (ref 0–0.03)
TROPONIN I SERPL DL<=0.01 NG/ML-MCNC: 13.03 NG/ML (ref 0–0.03)
TROPONIN I SERPL DL<=0.01 NG/ML-MCNC: 16.37 NG/ML (ref 0–0.03)
TROPONIN I SERPL DL<=0.01 NG/ML-MCNC: 16.37 NG/ML (ref 0–0.03)
TROPONIN I SERPL DL<=0.01 NG/ML-MCNC: 3.91 NG/ML (ref 0–0.03)
TROPONIN I SERPL DL<=0.01 NG/ML-MCNC: 4.13 NG/ML (ref 0–0.03)
TROPONIN I SERPL DL<=0.01 NG/ML-MCNC: 7.01 NG/ML (ref 0–0.03)
TROPONIN I SERPL DL<=0.01 NG/ML-MCNC: 7.24 NG/ML (ref 0–0.03)
TROPONIN I SERPL DL<=0.01 NG/ML-MCNC: 7.65 NG/ML (ref 0–0.03)
TSH SERPL DL<=0.005 MIU/L-ACNC: 7.27 UIU/ML (ref 0.4–4)
TV REST PULMONARY ARTERY PRESSURE: 26 MMHG
TV REST PULMONARY ARTERY PRESSURE: 27 MMHG
TV REST PULMONARY ARTERY PRESSURE: 32 MMHG
VANCOMYCIN SERPL-MCNC: 14.2 UG/ML
VANCOMYCIN SERPL-MCNC: 15 UG/ML
VANCOMYCIN SERPL-MCNC: 15.5 UG/ML
VANCOMYCIN SERPL-MCNC: 16.2 UG/ML
VANCOMYCIN SERPL-MCNC: 16.3 UG/ML
VANCOMYCIN SERPL-MCNC: 16.7 UG/ML
VANCOMYCIN SERPL-MCNC: 18.7 UG/ML
VANCOMYCIN SERPL-MCNC: 19.7 UG/ML
VANCOMYCIN SERPL-MCNC: 21.2 UG/ML
VANCOMYCIN SERPL-MCNC: 21.9 UG/ML
VANCOMYCIN SERPL-MCNC: 22.9 UG/ML
VANCOMYCIN SERPL-MCNC: 23.2 UG/ML
VANCOMYCIN SERPL-MCNC: 23.3 UG/ML
VANCOMYCIN SERPL-MCNC: 24.1 UG/ML
VANCOMYCIN SERPL-MCNC: 25.6 UG/ML
VANCOMYCIN SERPL-MCNC: 25.7 UG/ML
VANCOMYCIN SERPL-MCNC: 25.8 UG/ML
VANCOMYCIN SERPL-MCNC: 26.6 UG/ML
VANCOMYCIN SERPL-MCNC: 26.8 UG/ML
VANCOMYCIN SERPL-MCNC: 27.4 UG/ML
VANCOMYCIN SERPL-MCNC: 27.8 UG/ML
VANCOMYCIN SERPL-MCNC: 29 UG/ML
VANCOMYCIN SERPL-MCNC: 31.1 UG/ML
VANCOMYCIN SERPL-MCNC: 31.3 UG/ML
VANCOMYCIN SERPL-MCNC: 31.4 UG/ML
VANCOMYCIN SERPL-MCNC: 33 UG/ML
VANCOMYCIN SERPL-MCNC: 33.9 UG/ML
VANCOMYCIN SERPL-MCNC: 36.9 UG/ML
VANCOMYCIN TROUGH SERPL-MCNC: 24.1 UG/ML
VIT B12 SERPL-MCNC: >2000 PG/ML (ref 210–950)
VT: 400
WBC # BLD AUTO: 10.05 K/UL (ref 3.9–12.7)
WBC # BLD AUTO: 10.05 K/UL (ref 3.9–12.7)
WBC # BLD AUTO: 10.12 K/UL
WBC # BLD AUTO: 10.14 K/UL (ref 3.9–12.7)
WBC # BLD AUTO: 10.17 K/UL
WBC # BLD AUTO: 10.2 K/UL
WBC # BLD AUTO: 10.27 K/UL (ref 3.9–12.7)
WBC # BLD AUTO: 10.29 K/UL (ref 3.9–12.7)
WBC # BLD AUTO: 10.39 K/UL (ref 3.9–12.7)
WBC # BLD AUTO: 10.61 K/UL (ref 3.9–12.7)
WBC # BLD AUTO: 10.62 K/UL
WBC # BLD AUTO: 10.64 K/UL
WBC # BLD AUTO: 10.91 K/UL (ref 3.9–12.7)
WBC # BLD AUTO: 11.14 K/UL (ref 3.9–12.7)
WBC # BLD AUTO: 11.17 K/UL
WBC # BLD AUTO: 11.2 K/UL
WBC # BLD AUTO: 11.24 K/UL
WBC # BLD AUTO: 11.24 K/UL (ref 3.9–12.7)
WBC # BLD AUTO: 11.28 K/UL
WBC # BLD AUTO: 11.37 K/UL
WBC # BLD AUTO: 11.47 K/UL (ref 3.9–12.7)
WBC # BLD AUTO: 11.65 K/UL (ref 3.9–12.7)
WBC # BLD AUTO: 11.67 K/UL (ref 3.9–12.7)
WBC # BLD AUTO: 11.68 K/UL (ref 3.9–12.7)
WBC # BLD AUTO: 11.83 K/UL
WBC # BLD AUTO: 11.83 K/UL (ref 3.9–12.7)
WBC # BLD AUTO: 11.93 K/UL (ref 3.9–12.7)
WBC # BLD AUTO: 11.97 K/UL (ref 3.9–12.7)
WBC # BLD AUTO: 11.98 K/UL (ref 3.9–12.7)
WBC # BLD AUTO: 12.05 K/UL
WBC # BLD AUTO: 12.12 K/UL (ref 3.9–12.7)
WBC # BLD AUTO: 12.15 K/UL (ref 3.9–12.7)
WBC # BLD AUTO: 12.16 K/UL (ref 3.9–12.7)
WBC # BLD AUTO: 12.23 K/UL (ref 3.9–12.7)
WBC # BLD AUTO: 12.39 K/UL (ref 3.9–12.7)
WBC # BLD AUTO: 12.79 K/UL (ref 3.9–12.7)
WBC # BLD AUTO: 12.85 K/UL (ref 3.9–12.7)
WBC # BLD AUTO: 12.89 K/UL (ref 3.9–12.7)
WBC # BLD AUTO: 13.38 K/UL (ref 3.9–12.7)
WBC # BLD AUTO: 13.52 K/UL (ref 3.9–12.7)
WBC # BLD AUTO: 13.63 K/UL (ref 3.9–12.7)
WBC # BLD AUTO: 13.64 K/UL (ref 3.9–12.7)
WBC # BLD AUTO: 13.69 K/UL (ref 3.9–12.7)
WBC # BLD AUTO: 14.59 K/UL (ref 3.9–12.7)
WBC # BLD AUTO: 14.87 K/UL (ref 3.9–12.7)
WBC # BLD AUTO: 15.15 K/UL (ref 3.9–12.7)
WBC # BLD AUTO: 15.3 K/UL (ref 3.9–12.7)
WBC # BLD AUTO: 15.38 K/UL
WBC # BLD AUTO: 15.64 K/UL (ref 3.9–12.7)
WBC # BLD AUTO: 16.28 K/UL (ref 3.9–12.7)
WBC # BLD AUTO: 16.45 K/UL (ref 3.9–12.7)
WBC # BLD AUTO: 16.64 K/UL (ref 3.9–12.7)
WBC # BLD AUTO: 17.16 K/UL
WBC # BLD AUTO: 17.72 K/UL (ref 3.9–12.7)
WBC # BLD AUTO: 17.87 K/UL
WBC # BLD AUTO: 19.17 K/UL (ref 3.9–12.7)
WBC # BLD AUTO: 19.26 K/UL (ref 3.9–12.7)
WBC # BLD AUTO: 19.49 K/UL (ref 3.9–12.7)
WBC # BLD AUTO: 19.49 K/UL (ref 3.9–12.7)
WBC # BLD AUTO: 20.2 K/UL (ref 3.9–12.7)
WBC # BLD AUTO: 20.47 K/UL (ref 3.9–12.7)
WBC # BLD AUTO: 20.6 K/UL (ref 3.9–12.7)
WBC # BLD AUTO: 20.91 K/UL (ref 3.9–12.7)
WBC # BLD AUTO: 23.05 K/UL (ref 3.9–12.7)
WBC # BLD AUTO: 6 K/UL
WBC # BLD AUTO: 6.69 K/UL
WBC # BLD AUTO: 7.33 K/UL (ref 3.9–12.7)
WBC # BLD AUTO: 7.33 K/UL (ref 3.9–12.7)
WBC # BLD AUTO: 7.37 K/UL
WBC # BLD AUTO: 7.59 K/UL (ref 3.9–12.7)
WBC # BLD AUTO: 7.65 K/UL (ref 3.9–12.7)
WBC # BLD AUTO: 7.76 K/UL (ref 3.9–12.7)
WBC # BLD AUTO: 7.78 K/UL (ref 3.9–12.7)
WBC # BLD AUTO: 8.05 K/UL (ref 3.9–12.7)
WBC # BLD AUTO: 8.15 K/UL
WBC # BLD AUTO: 8.37 K/UL
WBC # BLD AUTO: 8.47 K/UL (ref 3.9–12.7)
WBC # BLD AUTO: 8.49 K/UL
WBC # BLD AUTO: 8.59 K/UL (ref 3.9–12.7)
WBC # BLD AUTO: 8.6 K/UL (ref 3.9–12.7)
WBC # BLD AUTO: 8.62 K/UL
WBC # BLD AUTO: 8.68 K/UL (ref 3.9–12.7)
WBC # BLD AUTO: 8.71 K/UL
WBC # BLD AUTO: 8.71 K/UL (ref 3.9–12.7)
WBC # BLD AUTO: 8.73 K/UL (ref 3.9–12.7)
WBC # BLD AUTO: 8.81 K/UL (ref 3.9–12.7)
WBC # BLD AUTO: 8.82 K/UL
WBC # BLD AUTO: 8.83 K/UL (ref 3.9–12.7)
WBC # BLD AUTO: 8.97 K/UL
WBC # BLD AUTO: 9 K/UL (ref 3.9–12.7)
WBC # BLD AUTO: 9.03 K/UL (ref 3.9–12.7)
WBC # BLD AUTO: 9.12 K/UL
WBC # BLD AUTO: 9.12 K/UL (ref 3.9–12.7)
WBC # BLD AUTO: 9.16 K/UL
WBC # BLD AUTO: 9.16 K/UL (ref 3.9–12.7)
WBC # BLD AUTO: 9.18 K/UL
WBC # BLD AUTO: 9.2 K/UL (ref 3.9–12.7)
WBC # BLD AUTO: 9.25 K/UL (ref 3.9–12.7)
WBC # BLD AUTO: 9.31 K/UL
WBC # BLD AUTO: 9.36 K/UL (ref 3.9–12.7)
WBC # BLD AUTO: 9.36 K/UL (ref 3.9–12.7)
WBC # BLD AUTO: 9.41 K/UL
WBC # BLD AUTO: 9.47 K/UL (ref 3.9–12.7)
WBC # BLD AUTO: 9.5 K/UL (ref 3.9–12.7)
WBC # BLD AUTO: 9.58 K/UL
WBC # BLD AUTO: 9.58 K/UL
WBC # BLD AUTO: 9.6 K/UL (ref 3.9–12.7)
WBC # BLD AUTO: 9.61 K/UL (ref 3.9–12.7)
WBC # BLD AUTO: 9.62 K/UL (ref 3.9–12.7)
WBC # BLD AUTO: 9.68 K/UL
WBC # BLD AUTO: 9.7 K/UL (ref 3.9–12.7)
WBC # BLD AUTO: 9.72 K/UL (ref 3.9–12.7)
WBC # BLD AUTO: 9.88 K/UL
WBC # FLD: 1025 /CU MM
WBC # FLD: 1143 /CU MM
WBC # FLD: 1259 /CU MM
WBC # FLD: 1648 /CU MM
WBC # FLD: 1683 /CU MM
WBC # FLD: 187 /CU MM
WBC # FLD: 3225 /CU MM
WBC # FLD: 3528 /CU MM
WBC # FLD: 507 /CU MM
WBC # FLD: 573 /CU MM
WBC # FLD: 577 /CU MM
WBC # FLD: 71 /CU MM
WBC # FLD: 739 /CU MM
WBC # FLD: 798 /CU MM
WBC #/AREA STL HPF: NORMAL /[HPF]
WBC TOXIC VACUOLES BLD QL SMEAR: PRESENT

## 2019-01-01 PROCEDURE — 25000003 PHARM REV CODE 250: Mod: NTX | Performed by: NURSE PRACTITIONER

## 2019-01-01 PROCEDURE — 99152 MOD SED SAME PHYS/QHP 5/>YRS: CPT | Mod: GC,NTX,, | Performed by: INTERNAL MEDICINE

## 2019-01-01 PROCEDURE — 83735 ASSAY OF MAGNESIUM: CPT | Mod: NTX

## 2019-01-01 PROCEDURE — 25000003 PHARM REV CODE 250: Mod: NTX | Performed by: STUDENT IN AN ORGANIZED HEALTH CARE EDUCATION/TRAINING PROGRAM

## 2019-01-01 PROCEDURE — 25000003 PHARM REV CODE 250: Mod: NTX | Performed by: INTERNAL MEDICINE

## 2019-01-01 PROCEDURE — 80100014 HC HEMODIALYSIS 1:1: Mod: NTX

## 2019-01-01 PROCEDURE — 84145 PROCALCITONIN (PCT): CPT | Mod: NTX

## 2019-01-01 PROCEDURE — 94640 AIRWAY INHALATION TREATMENT: CPT | Mod: NTX

## 2019-01-01 PROCEDURE — 63600175 PHARM REV CODE 636 W HCPCS: Mod: NTX | Performed by: NURSE ANESTHETIST, CERTIFIED REGISTERED

## 2019-01-01 PROCEDURE — 11000004 HC SNF PRIVATE: Mod: NTX

## 2019-01-01 PROCEDURE — 90945 DIALYSIS ONE EVALUATION: CPT | Mod: NTX

## 2019-01-01 PROCEDURE — 99900035 HC TECH TIME PER 15 MIN (STAT): Mod: NTX

## 2019-01-01 PROCEDURE — 63600175 PHARM REV CODE 636 W HCPCS: Mod: NTX | Performed by: STUDENT IN AN ORGANIZED HEALTH CARE EDUCATION/TRAINING PROGRAM

## 2019-01-01 PROCEDURE — 63600175 PHARM REV CODE 636 W HCPCS: Mod: NTX | Performed by: NURSE PRACTITIONER

## 2019-01-01 PROCEDURE — 85730 THROMBOPLASTIN TIME PARTIAL: CPT | Mod: 91,NTX

## 2019-01-01 PROCEDURE — 80053 COMPREHEN METABOLIC PANEL: CPT | Mod: NTX

## 2019-01-01 PROCEDURE — 36620 PR INSERT CATH,ART,PERCUT,SHORTTERM: ICD-10-PCS | Mod: 59,,, | Performed by: ANESTHESIOLOGY

## 2019-01-01 PROCEDURE — 11000001 HC ACUTE MED/SURG PRIVATE ROOM: Mod: NTX

## 2019-01-01 PROCEDURE — 84100 ASSAY OF PHOSPHORUS: CPT | Mod: NTX

## 2019-01-01 PROCEDURE — 63600175 PHARM REV CODE 636 W HCPCS: Mod: NTX | Performed by: INTERNAL MEDICINE

## 2019-01-01 PROCEDURE — 83690 ASSAY OF LIPASE: CPT | Mod: NTX

## 2019-01-01 PROCEDURE — 63600175 PHARM REV CODE 636 W HCPCS: Mod: JG,NTX | Performed by: STUDENT IN AN ORGANIZED HEALTH CARE EDUCATION/TRAINING PROGRAM

## 2019-01-01 PROCEDURE — 99222 PR INITIAL HOSPITAL CARE,LEVL II: ICD-10-PCS | Mod: NTX,,, | Performed by: NURSE PRACTITIONER

## 2019-01-01 PROCEDURE — 97110 THERAPEUTIC EXERCISES: CPT | Mod: NTX

## 2019-01-01 PROCEDURE — 80053 COMPREHEN METABOLIC PANEL: CPT | Mod: 91,NTX

## 2019-01-01 PROCEDURE — 25000003 PHARM REV CODE 250: Mod: NTX | Performed by: HOSPITALIST

## 2019-01-01 PROCEDURE — 27202415 HC CARTRIDGE, CRRT: Mod: NTX

## 2019-01-01 PROCEDURE — G0378 HOSPITAL OBSERVATION PER HR: HCPCS | Mod: NTX

## 2019-01-01 PROCEDURE — 99232 SBSQ HOSP IP/OBS MODERATE 35: CPT | Mod: NTX,,, | Performed by: HOSPITALIST

## 2019-01-01 PROCEDURE — 99232 PR SUBSEQUENT HOSPITAL CARE,LEVL II: ICD-10-PCS | Mod: NTX,,, | Performed by: INTERNAL MEDICINE

## 2019-01-01 PROCEDURE — 85025 COMPLETE CBC W/AUTO DIFF WBC: CPT | Mod: NTX

## 2019-01-01 PROCEDURE — 99232 PR SUBSEQUENT HOSPITAL CARE,LEVL II: ICD-10-PCS | Mod: GC,NTX,, | Performed by: INTERNAL MEDICINE

## 2019-01-01 PROCEDURE — 36415 COLL VENOUS BLD VENIPUNCTURE: CPT | Mod: NTX

## 2019-01-01 PROCEDURE — 63600175 PHARM REV CODE 636 W HCPCS: Mod: NTX | Performed by: SURGERY

## 2019-01-01 PROCEDURE — 80076 HEPATIC FUNCTION PANEL: CPT | Mod: NTX

## 2019-01-01 PROCEDURE — 83036 HEMOGLOBIN GLYCOSYLATED A1C: CPT | Mod: NTX

## 2019-01-01 PROCEDURE — 99152 PR MOD CONSCIOUS SEDATION, SAME PHYS, 5+ YRS, FIRST 15 MIN: ICD-10-PCS | Mod: NTX,,, | Performed by: INTERNAL MEDICINE

## 2019-01-01 PROCEDURE — 87040 BLOOD CULTURE FOR BACTERIA: CPT | Mod: 59,NTX

## 2019-01-01 PROCEDURE — 99223 1ST HOSP IP/OBS HIGH 75: CPT | Mod: NTX,,, | Performed by: INTERNAL MEDICINE

## 2019-01-01 PROCEDURE — 80069 RENAL FUNCTION PANEL: CPT | Mod: NTX

## 2019-01-01 PROCEDURE — 99239 HOSP IP/OBS DSCHRG MGMT >30: CPT | Mod: NTX,,, | Performed by: INTERNAL MEDICINE

## 2019-01-01 PROCEDURE — 88341 IMHCHEM/IMCYTCHM EA ADD ANTB: CPT | Mod: 26,NTX,, | Performed by: PATHOLOGY

## 2019-01-01 PROCEDURE — 90945 DIALYSIS ONE EVALUATION: CPT | Mod: NTX,,, | Performed by: NURSE PRACTITIONER

## 2019-01-01 PROCEDURE — 87186 SC STD MICRODIL/AGAR DIL: CPT | Mod: NTX

## 2019-01-01 PROCEDURE — C1894 INTRO/SHEATH, NON-LASER: HCPCS | Mod: NTX | Performed by: INTERNAL MEDICINE

## 2019-01-01 PROCEDURE — 99232 SBSQ HOSP IP/OBS MODERATE 35: CPT | Mod: GC,NTX,, | Performed by: INTERNAL MEDICINE

## 2019-01-01 PROCEDURE — 82550 ASSAY OF CK (CPK): CPT | Mod: NTX

## 2019-01-01 PROCEDURE — P9021 RED BLOOD CELLS UNIT: HCPCS | Mod: NTX

## 2019-01-01 PROCEDURE — 83605 ASSAY OF LACTIC ACID: CPT | Mod: NTX

## 2019-01-01 PROCEDURE — S5571 INSULIN DISPOS PEN 3 ML: HCPCS | Mod: NTX | Performed by: INTERNAL MEDICINE

## 2019-01-01 PROCEDURE — 85610 PROTHROMBIN TIME: CPT | Mod: NTX

## 2019-01-01 PROCEDURE — 99233 SBSQ HOSP IP/OBS HIGH 50: CPT | Mod: NTX,,, | Performed by: INTERNAL MEDICINE

## 2019-01-01 PROCEDURE — 96372 THER/PROPH/DIAG INJ SC/IM: CPT | Mod: 59,NTX

## 2019-01-01 PROCEDURE — 85025 COMPLETE CBC W/AUTO DIFF WBC: CPT | Mod: 91,NTX

## 2019-01-01 PROCEDURE — 93010 ELECTROCARDIOGRAM REPORT: CPT | Mod: NTX,,, | Performed by: INTERNAL MEDICINE

## 2019-01-01 PROCEDURE — 99232 SBSQ HOSP IP/OBS MODERATE 35: CPT | Mod: NTX,,, | Performed by: INTERNAL MEDICINE

## 2019-01-01 PROCEDURE — 97530 THERAPEUTIC ACTIVITIES: CPT | Mod: NTX

## 2019-01-01 PROCEDURE — 36556 PR INSERT NON-TUNNEL CV CATH 5+ YRS OLD: ICD-10-PCS | Mod: LT,NTX,, | Performed by: STUDENT IN AN ORGANIZED HEALTH CARE EDUCATION/TRAINING PROGRAM

## 2019-01-01 PROCEDURE — 84439 ASSAY OF FREE THYROXINE: CPT | Mod: NTX

## 2019-01-01 PROCEDURE — 99222 1ST HOSP IP/OBS MODERATE 55: CPT | Mod: NTX,,, | Performed by: SURGERY

## 2019-01-01 PROCEDURE — P9016 RBC LEUKOCYTES REDUCED: HCPCS | Mod: NTX

## 2019-01-01 PROCEDURE — 64447 NJX AA&/STRD FEMORAL NRV IMG: CPT | Mod: 59,RT,NTX, | Performed by: ANESTHESIOLOGY

## 2019-01-01 PROCEDURE — 80069 RENAL FUNCTION PANEL: CPT | Mod: 91,NTX

## 2019-01-01 PROCEDURE — 20600001 HC STEP DOWN PRIVATE ROOM: Mod: NTX

## 2019-01-01 PROCEDURE — 63600175 PHARM REV CODE 636 W HCPCS: Mod: NTX | Performed by: FAMILY MEDICINE

## 2019-01-01 PROCEDURE — 36005 INJECTION EXT VENOGRAPHY: CPT | Mod: NTX | Performed by: SURGERY

## 2019-01-01 PROCEDURE — 99223 1ST HOSP IP/OBS HIGH 75: CPT | Mod: NTX,,, | Performed by: GENERAL ACUTE CARE HOSPITAL

## 2019-01-01 PROCEDURE — 87449 NOS EACH ORGANISM AG IA: CPT | Mod: NTX

## 2019-01-01 PROCEDURE — 85014 HEMATOCRIT: CPT | Mod: 91,NTX

## 2019-01-01 PROCEDURE — 99999 PR PBB SHADOW E&M-EST. PATIENT-LVL III: CPT | Mod: PBBFAC,,, | Performed by: SURGERY

## 2019-01-01 PROCEDURE — 99222 PR INITIAL HOSPITAL CARE,LEVL II: ICD-10-PCS | Mod: 25,NTX,, | Performed by: INTERNAL MEDICINE

## 2019-01-01 PROCEDURE — 99233 SBSQ HOSP IP/OBS HIGH 50: CPT | Mod: NTX,,, | Performed by: NURSE PRACTITIONER

## 2019-01-01 PROCEDURE — 27000221 HC OXYGEN, UP TO 24 HOURS: Mod: NTX

## 2019-01-01 PROCEDURE — 99231 PR SUBSEQUENT HOSPITAL CARE,LEVL I: ICD-10-PCS | Mod: NTX,,, | Performed by: INTERNAL MEDICINE

## 2019-01-01 PROCEDURE — C9113 INJ PANTOPRAZOLE SODIUM, VIA: HCPCS | Mod: NTX | Performed by: STUDENT IN AN ORGANIZED HEALTH CARE EDUCATION/TRAINING PROGRAM

## 2019-01-01 PROCEDURE — 37195 THROMBOLYTIC THERAPY STROKE: CPT | Mod: NTX

## 2019-01-01 PROCEDURE — 97535 SELF CARE MNGMENT TRAINING: CPT | Mod: NTX

## 2019-01-01 PROCEDURE — 99306 1ST NF CARE HIGH MDM 50: CPT | Mod: NTX,,, | Performed by: HOSPITALIST

## 2019-01-01 PROCEDURE — 25500020 PHARM REV CODE 255: Mod: NTX | Performed by: INTERNAL MEDICINE

## 2019-01-01 PROCEDURE — 85018 HEMOGLOBIN: CPT | Mod: NTX

## 2019-01-01 PROCEDURE — 36000711: Mod: NTX | Performed by: ORTHOPAEDIC SURGERY

## 2019-01-01 PROCEDURE — 80100016 HC MAINTENANCE HEMODIALYSIS: Mod: NTX

## 2019-01-01 PROCEDURE — 36556 INSERT NON-TUNNEL CV CATH: CPT | Mod: LT,NTX,, | Performed by: STUDENT IN AN ORGANIZED HEALTH CARE EDUCATION/TRAINING PROGRAM

## 2019-01-01 PROCEDURE — 97116 GAIT TRAINING THERAPY: CPT | Mod: NTX

## 2019-01-01 PROCEDURE — 85730 THROMBOPLASTIN TIME PARTIAL: CPT | Mod: NTX

## 2019-01-01 PROCEDURE — 82010 KETONE BODYS QUAN: CPT | Mod: NTX

## 2019-01-01 PROCEDURE — 93458 L HRT ARTERY/VENTRICLE ANGIO: CPT | Mod: 26,GC,NTX, | Performed by: INTERNAL MEDICINE

## 2019-01-01 PROCEDURE — 31500 PR INSERT, EMERGENCY ENDOTRACH AIRWAY: ICD-10-PCS | Mod: NTX,,, | Performed by: INTERNAL MEDICINE

## 2019-01-01 PROCEDURE — 36430 TRANSFUSION BLD/BLD COMPNT: CPT | Mod: NTX

## 2019-01-01 PROCEDURE — 99233 SBSQ HOSP IP/OBS HIGH 50: CPT | Mod: 25,NTX,, | Performed by: NURSE PRACTITIONER

## 2019-01-01 PROCEDURE — 93458 PR CATH PLACE/CORON ANGIO, IMG SUPER/INTERP,W LEFT HEART VENTRICULOGRAPHY: ICD-10-PCS | Mod: 26,GC,NTX, | Performed by: INTERNAL MEDICINE

## 2019-01-01 PROCEDURE — 99999 PR PBB SHADOW E&M-EST. PATIENT-LVL III: CPT | Mod: PBBFAC,NTX,, | Performed by: SURGERY

## 2019-01-01 PROCEDURE — 93005 ELECTROCARDIOGRAM TRACING: CPT | Mod: NTX

## 2019-01-01 PROCEDURE — 80202 ASSAY OF VANCOMYCIN: CPT | Mod: NTX

## 2019-01-01 PROCEDURE — 99217 PR OBSERVATION CARE DISCHARGE: CPT | Mod: NTX,,, | Performed by: INTERNAL MEDICINE

## 2019-01-01 PROCEDURE — 99285 EMERGENCY DEPT VISIT HI MDM: CPT | Mod: NTX,,, | Performed by: EMERGENCY MEDICINE

## 2019-01-01 PROCEDURE — 99285 EMERGENCY DEPT VISIT HI MDM: CPT | Mod: 25,NTX

## 2019-01-01 PROCEDURE — 99239 PR HOSPITAL DISCHARGE DAY,>30 MIN: ICD-10-PCS | Mod: NTX,,, | Performed by: INTERNAL MEDICINE

## 2019-01-01 PROCEDURE — 84295 ASSAY OF SERUM SODIUM: CPT | Mod: NTX

## 2019-01-01 PROCEDURE — 76937 US GUIDE VASCULAR ACCESS: CPT | Mod: NTX

## 2019-01-01 PROCEDURE — 25500020 PHARM REV CODE 255: Mod: NTX | Performed by: STUDENT IN AN ORGANIZED HEALTH CARE EDUCATION/TRAINING PROGRAM

## 2019-01-01 PROCEDURE — 99291 PR CRITICAL CARE, E/M 30-74 MINUTES: ICD-10-PCS | Mod: 25,NTX,, | Performed by: CLINICAL NURSE SPECIALIST

## 2019-01-01 PROCEDURE — 99222 1ST HOSP IP/OBS MODERATE 55: CPT | Mod: NTX,,, | Performed by: NURSE PRACTITIONER

## 2019-01-01 PROCEDURE — 25000003 PHARM REV CODE 250: Mod: NTX | Performed by: FAMILY MEDICINE

## 2019-01-01 PROCEDURE — 83735 ASSAY OF MAGNESIUM: CPT | Mod: 91,NTX

## 2019-01-01 PROCEDURE — 99215 PR OFFICE/OUTPT VISIT, EST, LEVL V, 40-54 MIN: ICD-10-PCS | Mod: NTX,,, | Performed by: NURSE PRACTITIONER

## 2019-01-01 PROCEDURE — 94761 N-INVAS EAR/PLS OXIMETRY MLT: CPT | Mod: NTX

## 2019-01-01 PROCEDURE — 86850 RBC ANTIBODY SCREEN: CPT | Mod: NTX

## 2019-01-01 PROCEDURE — 20000000 HC ICU ROOM: Mod: NTX

## 2019-01-01 PROCEDURE — 87075 CULTR BACTERIA EXCEPT BLOOD: CPT | Mod: NTX

## 2019-01-01 PROCEDURE — 86920 COMPATIBILITY TEST SPIN: CPT | Mod: NTX

## 2019-01-01 PROCEDURE — 87040 BLOOD CULTURE FOR BACTERIA: CPT | Mod: NTX

## 2019-01-01 PROCEDURE — 99284 PR EMERGENCY DEPT VISIT,LEVEL IV: ICD-10-PCS | Mod: NTX,,, | Performed by: PHYSICIAN ASSISTANT

## 2019-01-01 PROCEDURE — 80048 BASIC METABOLIC PNL TOTAL CA: CPT | Mod: NTX

## 2019-01-01 PROCEDURE — 83605 ASSAY OF LACTIC ACID: CPT | Mod: 91,NTX

## 2019-01-01 PROCEDURE — 84484 ASSAY OF TROPONIN QUANT: CPT | Mod: 91,NTX

## 2019-01-01 PROCEDURE — 83880 ASSAY OF NATRIURETIC PEPTIDE: CPT | Mod: NTX

## 2019-01-01 PROCEDURE — 90935 HEMODIALYSIS ONE EVALUATION: CPT | Mod: NTX,,, | Performed by: INTERNAL MEDICINE

## 2019-01-01 PROCEDURE — 94003 VENT MGMT INPAT SUBQ DAY: CPT | Mod: NTX

## 2019-01-01 PROCEDURE — 63600175 PHARM REV CODE 636 W HCPCS: Mod: JG,NTX | Performed by: INTERNAL MEDICINE

## 2019-01-01 PROCEDURE — 93458 L HRT ARTERY/VENTRICLE ANGIO: CPT | Mod: NTX | Performed by: INTERNAL MEDICINE

## 2019-01-01 PROCEDURE — 25000003 PHARM REV CODE 250: Mod: NTX | Performed by: GENERAL PRACTICE

## 2019-01-01 PROCEDURE — 90935 HEMODIALYSIS ONE EVALUATION: CPT | Mod: NTX,,, | Performed by: NURSE PRACTITIONER

## 2019-01-01 PROCEDURE — 99220 PR INITIAL OBSERVATION CARE,LEVL III: ICD-10-PCS | Mod: NTX,,, | Performed by: PHYSICIAN ASSISTANT

## 2019-01-01 PROCEDURE — 99231 SBSQ HOSP IP/OBS SF/LOW 25: CPT | Mod: NTX,,, | Performed by: INTERNAL MEDICINE

## 2019-01-01 PROCEDURE — 99220 PR INITIAL OBSERVATION CARE,LEVL III: CPT | Mod: NTX,,, | Performed by: INTERNAL MEDICINE

## 2019-01-01 PROCEDURE — 27201236 HC CRRT SET UP & CANCELED: Mod: NTX

## 2019-01-01 PROCEDURE — 99231 SBSQ HOSP IP/OBS SF/LOW 25: CPT | Mod: NTX,,, | Performed by: ANESTHESIOLOGY

## 2019-01-01 PROCEDURE — 85384 FIBRINOGEN ACTIVITY: CPT | Mod: NTX

## 2019-01-01 PROCEDURE — 63600175 PHARM REV CODE 636 W HCPCS: Mod: NTX | Performed by: GENERAL PRACTICE

## 2019-01-01 PROCEDURE — 80100008 HC CRRT DAILY MAINTENANCE: Mod: NTX

## 2019-01-01 PROCEDURE — 97803 MED NUTRITION INDIV SUBSEQ: CPT | Mod: NTX

## 2019-01-01 PROCEDURE — 99232 PR SUBSEQUENT HOSPITAL CARE,LEVL II: ICD-10-PCS | Mod: NTX,,, | Performed by: HOSPITALIST

## 2019-01-01 PROCEDURE — 96376 TX/PRO/DX INJ SAME DRUG ADON: CPT | Mod: NTX

## 2019-01-01 PROCEDURE — 99291 CRITICAL CARE FIRST HOUR: CPT | Mod: NTX,,, | Performed by: INTERNAL MEDICINE

## 2019-01-01 PROCEDURE — 25000003 PHARM REV CODE 250: Mod: NTX | Performed by: PHYSICIAN ASSISTANT

## 2019-01-01 PROCEDURE — 87206 SMEAR FLUORESCENT/ACID STAI: CPT | Mod: NTX

## 2019-01-01 PROCEDURE — 25000242 PHARM REV CODE 250 ALT 637 W/ HCPCS: Mod: NTX | Performed by: NURSE PRACTITIONER

## 2019-01-01 PROCEDURE — D9220A PRA ANESTHESIA: ICD-10-PCS | Mod: NTX,,, | Performed by: ANESTHESIOLOGY

## 2019-01-01 PROCEDURE — 18500000 HC LEAVE OF ABSENCE HOSPITAL SERVICES: Mod: NTX

## 2019-01-01 PROCEDURE — 99285 PR EMERGENCY DEPT VISIT,LEVEL V: ICD-10-PCS | Mod: NTX,,, | Performed by: EMERGENCY MEDICINE

## 2019-01-01 PROCEDURE — 97165 OT EVAL LOW COMPLEX 30 MIN: CPT | Mod: NTX

## 2019-01-01 PROCEDURE — 99233 PR SUBSEQUENT HOSPITAL CARE,LEVL III: ICD-10-PCS | Mod: NTX,,, | Performed by: INTERNAL MEDICINE

## 2019-01-01 PROCEDURE — 99152 MOD SED SAME PHYS/QHP 5/>YRS: CPT | Mod: NTX | Performed by: INTERNAL MEDICINE

## 2019-01-01 PROCEDURE — 36558 INSERT TUNNELED CV CATH: CPT | Mod: 53,NTX,, | Performed by: INTERNAL MEDICINE

## 2019-01-01 PROCEDURE — 99223 PR INITIAL HOSPITAL CARE,LEVL III: ICD-10-PCS | Mod: NTX,,, | Performed by: GENERAL ACUTE CARE HOSPITAL

## 2019-01-01 PROCEDURE — 99214 PR OFFICE/OUTPT VISIT, EST, LEVL IV, 30-39 MIN: ICD-10-PCS | Mod: NTX,,, | Performed by: NURSE PRACTITIONER

## 2019-01-01 PROCEDURE — 64446 NJX AA&/STRD SC NRV NFS IMG: CPT | Mod: NTX | Performed by: ANESTHESIOLOGY

## 2019-01-01 PROCEDURE — 64450 PR NERVE BLOCK INJ, ANES/STEROID, OTHER PERIPHERAL: ICD-10-PCS | Mod: 59,RT,NTX, | Performed by: ANESTHESIOLOGY

## 2019-01-01 PROCEDURE — 88305 TISSUE SPECIMEN TO PATHOLOGY - SURGERY: ICD-10-PCS | Mod: 26,NTX,, | Performed by: PATHOLOGY

## 2019-01-01 PROCEDURE — 36558 INSERT TUNNELED CV CATH: CPT | Mod: 50,53,NTX, | Performed by: STUDENT IN AN ORGANIZED HEALTH CARE EDUCATION/TRAINING PROGRAM

## 2019-01-01 PROCEDURE — S0039 INJECTION, SULFAMETHOXAZOLE: HCPCS | Mod: NTX | Performed by: INTERNAL MEDICINE

## 2019-01-01 PROCEDURE — 71000015 HC POSTOP RECOV 1ST HR: Mod: NTX | Performed by: SURGERY

## 2019-01-01 PROCEDURE — 84132 ASSAY OF SERUM POTASSIUM: CPT | Mod: NTX

## 2019-01-01 PROCEDURE — 37000009 HC ANESTHESIA EA ADD 15 MINS: Mod: NTX | Performed by: STUDENT IN AN ORGANIZED HEALTH CARE EDUCATION/TRAINING PROGRAM

## 2019-01-01 PROCEDURE — 86706 HEP B SURFACE ANTIBODY: CPT | Mod: NTX

## 2019-01-01 PROCEDURE — 36830 ARTERY-VEIN NONAUTOGRAFT: CPT | Mod: GC,NTX,, | Performed by: SURGERY

## 2019-01-01 PROCEDURE — 37000008 HC ANESTHESIA 1ST 15 MINUTES: Mod: NTX | Performed by: SURGERY

## 2019-01-01 PROCEDURE — 80202 ASSAY OF VANCOMYCIN: CPT | Mod: 91,NTX

## 2019-01-01 PROCEDURE — 99291 PR CRITICAL CARE, E/M 30-74 MINUTES: ICD-10-PCS | Mod: NTX,,, | Performed by: INTERNAL MEDICINE

## 2019-01-01 PROCEDURE — 75820 VEIN X-RAY ARM/LEG: CPT | Mod: 26,GC,NTX, | Performed by: SURGERY

## 2019-01-01 PROCEDURE — 27201423 OPTIME MED/SURG SUP & DEVICES STERILE SUPPLY: Mod: NTX | Performed by: INTERNAL MEDICINE

## 2019-01-01 PROCEDURE — 90935 PR HEMODIALYSIS, ONE EVALUATION: ICD-10-PCS | Mod: NTX,,, | Performed by: INTERNAL MEDICINE

## 2019-01-01 PROCEDURE — 20690 PR APPLY BONE UNIPLANE,EXT FIX DEV: ICD-10-PCS | Mod: NTX,,, | Performed by: ORTHOPAEDIC SURGERY

## 2019-01-01 PROCEDURE — 85014 HEMATOCRIT: CPT | Mod: NTX

## 2019-01-01 PROCEDURE — 99225 PR SUBSEQUENT OBSERVATION CARE,LEVEL II: CPT | Mod: NTX,,, | Performed by: INTERNAL MEDICINE

## 2019-01-01 PROCEDURE — 99222 PR INITIAL HOSPITAL CARE,LEVL II: ICD-10-PCS | Mod: NTX,,, | Performed by: SURGERY

## 2019-01-01 PROCEDURE — 82272 OCCULT BLD FECES 1-3 TESTS: CPT | Mod: NTX

## 2019-01-01 PROCEDURE — 87077 CULTURE AEROBIC IDENTIFY: CPT | Mod: NTX

## 2019-01-01 PROCEDURE — G0257 UNSCHED DIALYSIS ESRD PT HOS: HCPCS | Mod: NTX

## 2019-01-01 PROCEDURE — 99217 PR OBSERVATION CARE DISCHARGE: CPT | Mod: NTX,,, | Performed by: PHYSICIAN ASSISTANT

## 2019-01-01 PROCEDURE — 89051 BODY FLUID CELL COUNT: CPT | Mod: 91,NTX

## 2019-01-01 PROCEDURE — 27201040 HC RC 50 FILTER: Mod: NTX

## 2019-01-01 PROCEDURE — 89051 BODY FLUID CELL COUNT: CPT | Mod: NTX

## 2019-01-01 PROCEDURE — 86901 BLOOD TYPING SEROLOGIC RH(D): CPT | Mod: NTX

## 2019-01-01 PROCEDURE — 97802 MEDICAL NUTRITION INDIV IN: CPT | Mod: NTX

## 2019-01-01 PROCEDURE — 36600 WITHDRAWAL OF ARTERIAL BLOOD: CPT | Mod: NTX

## 2019-01-01 PROCEDURE — 82746 ASSAY OF FOLIC ACID SERUM: CPT | Mod: NTX

## 2019-01-01 PROCEDURE — 99499 NO LOS: ICD-10-PCS | Mod: NTX,,, | Performed by: STUDENT IN AN ORGANIZED HEALTH CARE EDUCATION/TRAINING PROGRAM

## 2019-01-01 PROCEDURE — 99223 PR INITIAL HOSPITAL CARE,LEVL III: ICD-10-PCS | Mod: NTX,,, | Performed by: NURSE PRACTITIONER

## 2019-01-01 PROCEDURE — 96368 THER/DIAG CONCURRENT INF: CPT | Mod: NTX

## 2019-01-01 PROCEDURE — A4216 STERILE WATER/SALINE, 10 ML: HCPCS | Mod: NTX | Performed by: PHYSICIAN ASSISTANT

## 2019-01-01 PROCEDURE — C1750 CATH, HEMODIALYSIS,LONG-TERM: HCPCS | Mod: NTX | Performed by: INTERNAL MEDICINE

## 2019-01-01 PROCEDURE — 36556 INSERT NON-TUNNEL CV CATH: CPT | Mod: NTX,,, | Performed by: SURGERY

## 2019-01-01 PROCEDURE — 90935 HEMODIALYSIS ONE EVALUATION: CPT | Mod: NTX

## 2019-01-01 PROCEDURE — 80074 ACUTE HEPATITIS PANEL: CPT | Mod: NTX

## 2019-01-01 PROCEDURE — 76942 PERIPHERAL BLOCK: ICD-10-PCS | Mod: 26,NTX,, | Performed by: ANESTHESIOLOGY

## 2019-01-01 PROCEDURE — 36556 INSERT NON-TUNNEL CV CATH: CPT | Mod: NTX

## 2019-01-01 PROCEDURE — 84484 ASSAY OF TROPONIN QUANT: CPT | Mod: NTX

## 2019-01-01 PROCEDURE — 80047 BASIC METABLC PNL IONIZED CA: CPT | Mod: NTX

## 2019-01-01 PROCEDURE — 82330 ASSAY OF CALCIUM: CPT | Mod: NTX

## 2019-01-01 PROCEDURE — 36005 INJECTION EXT VENOGRAPHY: CPT | Mod: GC,NTX,, | Performed by: SURGERY

## 2019-01-01 PROCEDURE — 31500 INSERT EMERGENCY AIRWAY: CPT | Mod: NTX,,, | Performed by: INTERNAL MEDICINE

## 2019-01-01 PROCEDURE — 99153 MOD SED SAME PHYS/QHP EA: CPT | Mod: NTX | Performed by: INTERNAL MEDICINE

## 2019-01-01 PROCEDURE — 85018 HEMOGLOBIN: CPT | Mod: 91,NTX

## 2019-01-01 PROCEDURE — 37000009 HC ANESTHESIA EA ADD 15 MINS: Mod: NTX | Performed by: ORTHOPAEDIC SURGERY

## 2019-01-01 PROCEDURE — 99291 PR CRITICAL CARE, E/M 30-74 MINUTES: ICD-10-PCS | Mod: GC,NTX,, | Performed by: EMERGENCY MEDICINE

## 2019-01-01 PROCEDURE — 88341 PR IHC OR ICC EACH ADD'L SINGLE ANTIBODY  STAINPR: ICD-10-PCS | Mod: 26,NTX,, | Performed by: PATHOLOGY

## 2019-01-01 PROCEDURE — 63600175 PHARM REV CODE 636 W HCPCS: Mod: JG,NTX | Performed by: NURSE PRACTITIONER

## 2019-01-01 PROCEDURE — 93010 EKG 12-LEAD: ICD-10-PCS | Mod: NTX,,, | Performed by: INTERNAL MEDICINE

## 2019-01-01 PROCEDURE — 96361 HYDRATE IV INFUSION ADD-ON: CPT

## 2019-01-01 PROCEDURE — 76942 ECHO GUIDE FOR BIOPSY: CPT | Mod: 26,NTX,, | Performed by: ANESTHESIOLOGY

## 2019-01-01 PROCEDURE — 99291 CRITICAL CARE FIRST HOUR: CPT | Mod: GC,NTX,, | Performed by: INTERNAL MEDICINE

## 2019-01-01 PROCEDURE — 99214 OFFICE O/P EST MOD 30 MIN: CPT | Mod: NTX,,, | Performed by: INTERNAL MEDICINE

## 2019-01-01 PROCEDURE — 36593 DECLOT VASCULAR DEVICE: CPT | Mod: NTX

## 2019-01-01 PROCEDURE — 27201423 OPTIME MED/SURG SUP & DEVICES STERILE SUPPLY: Mod: NTX | Performed by: ORTHOPAEDIC SURGERY

## 2019-01-01 PROCEDURE — 36556 PR INSERT NON-TUNNEL CV CATH 5+ YRS OLD: ICD-10-PCS | Mod: NTX,,, | Performed by: SURGERY

## 2019-01-01 PROCEDURE — 63600175 PHARM REV CODE 636 W HCPCS: Mod: NTX | Performed by: EMERGENCY MEDICINE

## 2019-01-01 PROCEDURE — 20694 PR REMOVE EXTERN BONE FIX DEV W ANESTH: ICD-10-PCS | Mod: 58,NTX,, | Performed by: ORTHOPAEDIC SURGERY

## 2019-01-01 PROCEDURE — 77001 FLUOROGUIDE FOR VEIN DEVICE: CPT | Mod: 26,NTX,, | Performed by: STUDENT IN AN ORGANIZED HEALTH CARE EDUCATION/TRAINING PROGRAM

## 2019-01-01 PROCEDURE — 90945 DIALYSIS ONE EVALUATION: CPT | Mod: NTX,,, | Performed by: INTERNAL MEDICINE

## 2019-01-01 PROCEDURE — 99152 MOD SED SAME PHYS/QHP 5/>YRS: CPT | Mod: NTX,,, | Performed by: INTERNAL MEDICINE

## 2019-01-01 PROCEDURE — S5571 INSULIN DISPOS PEN 3 ML: HCPCS | Mod: NTX | Performed by: NURSE PRACTITIONER

## 2019-01-01 PROCEDURE — 33967 PR IABP INSERTION: ICD-10-PCS | Mod: 51,GC,NTX, | Performed by: INTERNAL MEDICINE

## 2019-01-01 PROCEDURE — 36556 INSERT NON-TUNNEL CV CATH: CPT | Mod: NTX,,, | Performed by: STUDENT IN AN ORGANIZED HEALTH CARE EDUCATION/TRAINING PROGRAM

## 2019-01-01 PROCEDURE — 99217 PR OBSERVATION CARE DISCHARGE: ICD-10-PCS | Mod: NTX,,, | Performed by: PHYSICIAN ASSISTANT

## 2019-01-01 PROCEDURE — 99233 SBSQ HOSP IP/OBS HIGH 50: CPT | Mod: GC,NTX,, | Performed by: INTERNAL MEDICINE

## 2019-01-01 PROCEDURE — 25500020 PHARM REV CODE 255: Mod: NTX | Performed by: FAMILY MEDICINE

## 2019-01-01 PROCEDURE — 77001 FLUOROGUIDE FOR VEIN DEVICE: CPT | Mod: NTX | Performed by: INTERNAL MEDICINE

## 2019-01-01 PROCEDURE — 25500020 PHARM REV CODE 255: Mod: NTX | Performed by: SURGERY

## 2019-01-01 PROCEDURE — 84100 ASSAY OF PHOSPHORUS: CPT | Mod: 91,NTX

## 2019-01-01 PROCEDURE — 82962 GLUCOSE BLOOD TEST: CPT | Mod: NTX

## 2019-01-01 PROCEDURE — 99222 PR INITIAL HOSPITAL CARE,LEVL II: ICD-10-PCS | Mod: 57,NTX,, | Performed by: STUDENT IN AN ORGANIZED HEALTH CARE EDUCATION/TRAINING PROGRAM

## 2019-01-01 PROCEDURE — C1713 ANCHOR/SCREW BN/BN,TIS/BN: HCPCS | Mod: NTX | Performed by: ORTHOPAEDIC SURGERY

## 2019-01-01 PROCEDURE — 90935 PR HEMODIALYSIS, ONE EVALUATION: ICD-10-PCS | Mod: NTX,,, | Performed by: NURSE PRACTITIONER

## 2019-01-01 PROCEDURE — 36830 PR CREAT AV FISTULA,NON-AUTOGENOUS GRAFT: ICD-10-PCS | Mod: GC,NTX,, | Performed by: SURGERY

## 2019-01-01 PROCEDURE — 96361 HYDRATE IV INFUSION ADD-ON: CPT | Mod: NTX

## 2019-01-01 PROCEDURE — 99233 PR SUBSEQUENT HOSPITAL CARE,LEVL III: ICD-10-PCS | Mod: NTX,,, | Performed by: HOSPITALIST

## 2019-01-01 PROCEDURE — 88342 TISSUE SPECIMEN TO PATHOLOGY - SURGERY: ICD-10-PCS | Mod: 26,NTX,, | Performed by: PATHOLOGY

## 2019-01-01 PROCEDURE — 25000003 PHARM REV CODE 250: Mod: NTX | Performed by: NURSE ANESTHETIST, CERTIFIED REGISTERED

## 2019-01-01 PROCEDURE — C1887 CATHETER, GUIDING: HCPCS | Mod: NTX | Performed by: INTERNAL MEDICINE

## 2019-01-01 PROCEDURE — 99225 PR SUBSEQUENT OBSERVATION CARE,LEVEL II: ICD-10-PCS | Mod: NTX,,, | Performed by: HOSPITALIST

## 2019-01-01 PROCEDURE — 99215 OFFICE O/P EST HI 40 MIN: CPT | Mod: NTX,,, | Performed by: NURSE PRACTITIONER

## 2019-01-01 PROCEDURE — 87046 STOOL CULTR AEROBIC BACT EA: CPT | Mod: 59,NTX

## 2019-01-01 PROCEDURE — 88342 IMHCHEM/IMCYTCHM 1ST ANTB: CPT | Mod: 26,NTX,, | Performed by: PATHOLOGY

## 2019-01-01 PROCEDURE — 99220 PR INITIAL OBSERVATION CARE,LEVL III: CPT | Mod: NTX,,, | Performed by: PHYSICIAN ASSISTANT

## 2019-01-01 PROCEDURE — 93990 DOPPLER FLOW TESTING: CPT | Mod: NTX | Performed by: SURGERY

## 2019-01-01 PROCEDURE — 96375 TX/PRO/DX INJ NEW DRUG ADDON: CPT

## 2019-01-01 PROCEDURE — 63600175 PHARM REV CODE 636 W HCPCS: Mod: NTX | Performed by: CLINICAL NURSE SPECIALIST

## 2019-01-01 PROCEDURE — 82728 ASSAY OF FERRITIN: CPT | Mod: NTX

## 2019-01-01 PROCEDURE — 96361 HYDRATE IV INFUSION ADD-ON: CPT | Mod: 59,NTX

## 2019-01-01 PROCEDURE — 99306 PR NURSING FACILITY CARE, INIT, HIGH SEVERITY: ICD-10-PCS | Mod: NTX,,, | Performed by: INTERNAL MEDICINE

## 2019-01-01 PROCEDURE — 83540 ASSAY OF IRON: CPT | Mod: NTX

## 2019-01-01 PROCEDURE — 77001 FLUOROGUIDE FOR VEIN DEVICE: CPT | Mod: 26,NTX,, | Performed by: INTERNAL MEDICINE

## 2019-01-01 PROCEDURE — 94799 UNLISTED PULMONARY SVC/PX: CPT | Mod: NTX

## 2019-01-01 PROCEDURE — 99233 PR SUBSEQUENT HOSPITAL CARE,LEVL III: ICD-10-PCS | Mod: GC,NTX,, | Performed by: INTERNAL MEDICINE

## 2019-01-01 PROCEDURE — 82247 BILIRUBIN TOTAL: CPT | Mod: NTX

## 2019-01-01 PROCEDURE — 77001 CHG FLUOROGUIDE CNTRL VEN ACCESS,PLACE,REPLACE,REMOVE: ICD-10-PCS | Mod: 26,NTX,, | Performed by: INTERNAL MEDICINE

## 2019-01-01 PROCEDURE — 63600175 PHARM REV CODE 636 W HCPCS: Mod: NTX | Performed by: ORTHOPAEDIC SURGERY

## 2019-01-01 PROCEDURE — 36581 REPLACE TUNNELED CV CATH: CPT | Mod: NTX | Performed by: INTERNAL MEDICINE

## 2019-01-01 PROCEDURE — 96374 THER/PROPH/DIAG INJ IV PUSH: CPT | Mod: NTX

## 2019-01-01 PROCEDURE — 99999 PR PBB SHADOW E&M-EST. PATIENT-LVL III: ICD-10-PCS | Mod: PBBFAC,,, | Performed by: SURGERY

## 2019-01-01 PROCEDURE — 87070 CULTURE OTHR SPECIMN AEROBIC: CPT | Mod: NTX

## 2019-01-01 PROCEDURE — 25000003 PHARM REV CODE 250: Mod: NTX | Performed by: CLINICAL NURSE SPECIALIST

## 2019-01-01 PROCEDURE — 99285 EMERGENCY DEPT VISIT HI MDM: CPT | Mod: NTX

## 2019-01-01 PROCEDURE — 71000039 HC RECOVERY, EACH ADD'L HOUR: Mod: NTX | Performed by: ORTHOPAEDIC SURGERY

## 2019-01-01 PROCEDURE — 82607 VITAMIN B-12: CPT | Mod: NTX

## 2019-01-01 PROCEDURE — 36558 PR INSERT TUNNELED CV CATH W/O PORT OR PUMP: ICD-10-PCS | Mod: 53,NTX,, | Performed by: INTERNAL MEDICINE

## 2019-01-01 PROCEDURE — 20690 APPL UNIPLN UNI EXT FIXJ SYS: CPT | Mod: NTX,,, | Performed by: ORTHOPAEDIC SURGERY

## 2019-01-01 PROCEDURE — 99223 1ST HOSP IP/OBS HIGH 75: CPT | Mod: NTX,,, | Performed by: STUDENT IN AN ORGANIZED HEALTH CARE EDUCATION/TRAINING PROGRAM

## 2019-01-01 PROCEDURE — 96366 THER/PROPH/DIAG IV INF ADDON: CPT | Mod: NTX

## 2019-01-01 PROCEDURE — D9220A PRA ANESTHESIA: Mod: CRNA,NTX,, | Performed by: NURSE ANESTHETIST, CERTIFIED REGISTERED

## 2019-01-01 PROCEDURE — 36000710: Mod: NTX | Performed by: ORTHOPAEDIC SURGERY

## 2019-01-01 PROCEDURE — 75820 VEIN X-RAY ARM/LEG: CPT | Mod: TC,NTX | Performed by: SURGERY

## 2019-01-01 PROCEDURE — 71000033 HC RECOVERY, INTIAL HOUR: Mod: NTX | Performed by: ORTHOPAEDIC SURGERY

## 2019-01-01 PROCEDURE — 33967 INSERT I-AORT PERCUT DEVICE: CPT | Mod: NTX | Performed by: INTERNAL MEDICINE

## 2019-01-01 PROCEDURE — 99291 PR CRITICAL CARE, E/M 30-74 MINUTES: ICD-10-PCS | Mod: GC,NTX,, | Performed by: INTERNAL MEDICINE

## 2019-01-01 PROCEDURE — 63600175 PHARM REV CODE 636 W HCPCS: Mod: NTX | Performed by: ANESTHESIOLOGY

## 2019-01-01 PROCEDURE — 25000003 PHARM REV CODE 250: Mod: NTX

## 2019-01-01 PROCEDURE — 43235 EGD DIAGNOSTIC BRUSH WASH: CPT | Mod: NTX | Performed by: INTERNAL MEDICINE

## 2019-01-01 PROCEDURE — 97161 PT EVAL LOW COMPLEX 20 MIN: CPT | Mod: NTX

## 2019-01-01 PROCEDURE — 99213 OFFICE O/P EST LOW 20 MIN: CPT | Mod: PBBFAC,NTX | Performed by: SURGERY

## 2019-01-01 PROCEDURE — 97162 PT EVAL MOD COMPLEX 30 MIN: CPT | Mod: NTX

## 2019-01-01 PROCEDURE — 63600175 PHARM REV CODE 636 W HCPCS: Mod: TXP | Performed by: INTERNAL MEDICINE

## 2019-01-01 PROCEDURE — 99219 PR INITIAL OBSERVATION CARE,LEVL II: CPT | Mod: NTX,,, | Performed by: PHYSICIAN ASSISTANT

## 2019-01-01 PROCEDURE — 99223 PR INITIAL HOSPITAL CARE,LEVL III: ICD-10-PCS | Mod: NTX,,, | Performed by: INTERNAL MEDICINE

## 2019-01-01 PROCEDURE — 96375 TX/PRO/DX INJ NEW DRUG ADDON: CPT | Mod: 59,NTX

## 2019-01-01 PROCEDURE — 99232 PR SUBSEQUENT HOSPITAL CARE,LEVL II: ICD-10-PCS | Mod: NTX,,, | Performed by: SURGERY

## 2019-01-01 PROCEDURE — 27201012 HC FORCEPS, HOT/COLD, DISP: Mod: NTX | Performed by: INTERNAL MEDICINE

## 2019-01-01 PROCEDURE — 27000202 HC IABP, ADD'L DAY: Mod: NTX

## 2019-01-01 PROCEDURE — 37000008 HC ANESTHESIA 1ST 15 MINUTES: Mod: NTX | Performed by: INTERNAL MEDICINE

## 2019-01-01 PROCEDURE — 36556 INSERT NON-TUNNEL CV CATH: CPT | Mod: LT,NTX,, | Performed by: SURGERY

## 2019-01-01 PROCEDURE — 37000009 HC ANESTHESIA EA ADD 15 MINS: Mod: NTX | Performed by: INTERNAL MEDICINE

## 2019-01-01 PROCEDURE — 99306 PR NURSING FACILITY CARE, INIT, HIGH SEVERITY: ICD-10-PCS | Mod: NTX,,, | Performed by: HOSPITALIST

## 2019-01-01 PROCEDURE — 84466 ASSAY OF TRANSFERRIN: CPT | Mod: NTX

## 2019-01-01 PROCEDURE — 27532 PR CLOSED RX TIB PLAT FX+MANIP: ICD-10-PCS | Mod: 51,RT,NTX, | Performed by: ORTHOPAEDIC SURGERY

## 2019-01-01 PROCEDURE — 99220 PR INITIAL OBSERVATION CARE,LEVL III: ICD-10-PCS | Mod: NTX,,, | Performed by: INTERNAL MEDICINE

## 2019-01-01 PROCEDURE — 83010 ASSAY OF HAPTOGLOBIN QUANT: CPT | Mod: NTX

## 2019-01-01 PROCEDURE — 84075 ASSAY ALKALINE PHOSPHATASE: CPT | Mod: NTX

## 2019-01-01 PROCEDURE — 99233 PR SUBSEQUENT HOSPITAL CARE,LEVL III: ICD-10-PCS | Mod: NTX,,, | Performed by: NURSE PRACTITIONER

## 2019-01-01 PROCEDURE — 74150 CT ABDOMEN W/O CONTRAST: CPT | Mod: 26,NTX,, | Performed by: RADIOLOGY

## 2019-01-01 PROCEDURE — 36620 INSERTION CATHETER ARTERY: CPT | Mod: NTX | Performed by: ANESTHESIOLOGY

## 2019-01-01 PROCEDURE — 99291 CRITICAL CARE FIRST HOUR: CPT | Mod: 25,NTX,, | Performed by: CLINICAL NURSE SPECIALIST

## 2019-01-01 PROCEDURE — S0028 INJECTION, FAMOTIDINE, 20 MG: HCPCS | Mod: NTX | Performed by: STUDENT IN AN ORGANIZED HEALTH CARE EDUCATION/TRAINING PROGRAM

## 2019-01-01 PROCEDURE — G0257 UNSCHED DIALYSIS ESRD PT HOS: HCPCS

## 2019-01-01 PROCEDURE — 88305 TISSUE EXAM BY PATHOLOGIST: CPT | Mod: 26,NTX,, | Performed by: PATHOLOGY

## 2019-01-01 PROCEDURE — 37000008 HC ANESTHESIA 1ST 15 MINUTES: Mod: NTX | Performed by: ORTHOPAEDIC SURGERY

## 2019-01-01 PROCEDURE — 87116 MYCOBACTERIA CULTURE: CPT | Mod: NTX

## 2019-01-01 PROCEDURE — 71000044 HC DOSC ROUTINE RECOVERY FIRST HOUR: Mod: NTX | Performed by: SURGERY

## 2019-01-01 PROCEDURE — 99223 1ST HOSP IP/OBS HIGH 75: CPT | Mod: NTX,,, | Performed by: SURGERY

## 2019-01-01 PROCEDURE — 27532 TREAT KNEE FRACTURE: CPT | Mod: 51,RT,NTX, | Performed by: ORTHOPAEDIC SURGERY

## 2019-01-01 PROCEDURE — 77001 CHG FLUOROGUIDE CNTRL VEN ACCESS,PLACE,REPLACE,REMOVE: ICD-10-PCS | Mod: 26,NTX,, | Performed by: STUDENT IN AN ORGANIZED HEALTH CARE EDUCATION/TRAINING PROGRAM

## 2019-01-01 PROCEDURE — 20694 RMVL EXT FIXJ SYS UNDER ANES: CPT | Mod: 58,NTX,, | Performed by: ORTHOPAEDIC SURGERY

## 2019-01-01 PROCEDURE — 99223 1ST HOSP IP/OBS HIGH 75: CPT | Mod: NTX,,, | Performed by: NURSE PRACTITIONER

## 2019-01-01 PROCEDURE — 93010 ELECTROCARDIOGRAM REPORT: CPT | Mod: NTX,,, | Performed by: STUDENT IN AN ORGANIZED HEALTH CARE EDUCATION/TRAINING PROGRAM

## 2019-01-01 PROCEDURE — 49422 PR REMOVAL TUNNELED INTRAPERITONEAL CATHETER: ICD-10-PCS | Mod: NTX,,, | Performed by: STUDENT IN AN ORGANIZED HEALTH CARE EDUCATION/TRAINING PROGRAM

## 2019-01-01 PROCEDURE — 87015 SPECIMEN INFECT AGNT CONCNTJ: CPT | Mod: NTX

## 2019-01-01 PROCEDURE — 75820 PR VENOGRAM EXTREMITY UNILAT: ICD-10-PCS | Mod: 26,GC,NTX, | Performed by: SURGERY

## 2019-01-01 PROCEDURE — 99232 SBSQ HOSP IP/OBS MODERATE 35: CPT | Mod: NTX,,, | Performed by: SURGERY

## 2019-01-01 PROCEDURE — 86920 COMPATIBILITY TEST SPIN: CPT | Mod: NTX,59

## 2019-01-01 PROCEDURE — 49422 REMOVE TUNNELED IP CATH: CPT | Mod: NTX,,, | Performed by: STUDENT IN AN ORGANIZED HEALTH CARE EDUCATION/TRAINING PROGRAM

## 2019-01-01 PROCEDURE — 99220 PR INITIAL OBSERVATION CARE,LEVL III: ICD-10-PCS | Mod: NTX,,, | Performed by: HOSPITALIST

## 2019-01-01 PROCEDURE — 82803 BLOOD GASES ANY COMBINATION: CPT | Mod: NTX

## 2019-01-01 PROCEDURE — 82962 GLUCOSE BLOOD TEST: CPT | Mod: NTX | Performed by: ORTHOPAEDIC SURGERY

## 2019-01-01 PROCEDURE — 99225 PR SUBSEQUENT OBSERVATION CARE,LEVEL II: CPT | Mod: NTX,,, | Performed by: HOSPITALIST

## 2019-01-01 PROCEDURE — 99238 PR HOSPITAL DISCHARGE DAY,<30 MIN: ICD-10-PCS | Mod: NTX,,, | Performed by: HOSPITALIST

## 2019-01-01 PROCEDURE — 25000003 PHARM REV CODE 250: Mod: NTX | Performed by: EMERGENCY MEDICINE

## 2019-01-01 PROCEDURE — 99217 PR OBSERVATION CARE DISCHARGE: ICD-10-PCS | Mod: NTX,,, | Performed by: INTERNAL MEDICINE

## 2019-01-01 PROCEDURE — 27100094 HC IABP, SET-UP: Mod: NTX

## 2019-01-01 PROCEDURE — 43239 EGD BIOPSY SINGLE/MULTIPLE: CPT | Mod: NTX | Performed by: INTERNAL MEDICINE

## 2019-01-01 PROCEDURE — 99213 OFFICE O/P EST LOW 20 MIN: CPT | Mod: PBBFAC,25 | Performed by: SURGERY

## 2019-01-01 PROCEDURE — 36000 PLACE NEEDLE IN VEIN: CPT | Mod: NTX | Performed by: ANESTHESIOLOGY

## 2019-01-01 PROCEDURE — 99222 1ST HOSP IP/OBS MODERATE 55: CPT | Mod: 57,NTX,, | Performed by: STUDENT IN AN ORGANIZED HEALTH CARE EDUCATION/TRAINING PROGRAM

## 2019-01-01 PROCEDURE — 99291 CRITICAL CARE FIRST HOUR: CPT | Mod: GC,NTX,, | Performed by: EMERGENCY MEDICINE

## 2019-01-01 PROCEDURE — 33967 INSERT I-AORT PERCUT DEVICE: CPT | Mod: 51,GC,NTX, | Performed by: INTERNAL MEDICINE

## 2019-01-01 PROCEDURE — 63600175 PHARM REV CODE 636 W HCPCS: Mod: NTX

## 2019-01-01 PROCEDURE — 99217 PR OBSERVATION CARE DISCHARGE: CPT | Mod: NTX,,, | Performed by: NURSE PRACTITIONER

## 2019-01-01 PROCEDURE — 99214 PR OFFICE/OUTPT VISIT, EST, LEVL IV, 30-39 MIN: ICD-10-PCS | Mod: S$PBB,,, | Performed by: SURGERY

## 2019-01-01 PROCEDURE — 99233 SBSQ HOSP IP/OBS HIGH 50: CPT | Mod: NTX,,, | Performed by: HOSPITALIST

## 2019-01-01 PROCEDURE — 99152 PR MOD CONSCIOUS SEDATION, SAME PHYS, 5+ YRS, FIRST 15 MIN: ICD-10-PCS | Mod: GC,NTX,, | Performed by: INTERNAL MEDICINE

## 2019-01-01 PROCEDURE — 74150 CT ABDOMEN WITHOUT CONTRAST: ICD-10-PCS | Mod: 26,NTX,, | Performed by: RADIOLOGY

## 2019-01-01 PROCEDURE — 93041 RHYTHM ECG TRACING: CPT | Mod: NTX

## 2019-01-01 PROCEDURE — 90945 PR DIALYSIS, NOT HEMO, 1 EVAL: ICD-10-PCS | Mod: NTX,,, | Performed by: NURSE PRACTITIONER

## 2019-01-01 PROCEDURE — 36558 INSERT TUNNELED CV CATH: CPT | Mod: NTX,,, | Performed by: INTERNAL MEDICINE

## 2019-01-01 PROCEDURE — 36410 VNPNXR 3YR/> PHY/QHP DX/THER: CPT | Mod: NTX

## 2019-01-01 PROCEDURE — 96365 THER/PROPH/DIAG IV INF INIT: CPT | Mod: NTX

## 2019-01-01 PROCEDURE — 97168 OT RE-EVAL EST PLAN CARE: CPT | Mod: NTX

## 2019-01-01 PROCEDURE — 97164 PT RE-EVAL EST PLAN CARE: CPT | Mod: NTX

## 2019-01-01 PROCEDURE — 99231 PR SUBSEQUENT HOSPITAL CARE,LEVL I: ICD-10-PCS | Mod: GC,NTX,, | Performed by: INTERNAL MEDICINE

## 2019-01-01 PROCEDURE — 93010 EKG 12-LEAD: ICD-10-PCS | Mod: NTX,,, | Performed by: STUDENT IN AN ORGANIZED HEALTH CARE EDUCATION/TRAINING PROGRAM

## 2019-01-01 PROCEDURE — 99214 PR OFFICE/OUTPT VISIT, EST, LEVL IV, 30-39 MIN: ICD-10-PCS | Mod: NTX,,, | Performed by: INTERNAL MEDICINE

## 2019-01-01 PROCEDURE — C1769 GUIDE WIRE: HCPCS | Mod: NTX | Performed by: INTERNAL MEDICINE

## 2019-01-01 PROCEDURE — 87493 C DIFF AMPLIFIED PROBE: CPT | Mod: NTX

## 2019-01-01 PROCEDURE — 84134 ASSAY OF PREALBUMIN: CPT | Mod: NTX

## 2019-01-01 PROCEDURE — 99223 PR INITIAL HOSPITAL CARE,LEVL III: ICD-10-PCS | Mod: NTX,,, | Performed by: SURGERY

## 2019-01-01 PROCEDURE — 99214 OFFICE O/P EST MOD 30 MIN: CPT | Mod: S$PBB,,, | Performed by: SURGERY

## 2019-01-01 PROCEDURE — 99999 PR PBB SHADOW E&M-EST. PATIENT-LVL III: ICD-10-PCS | Mod: PBBFAC,NTX,, | Performed by: SURGERY

## 2019-01-01 PROCEDURE — 99223 PR INITIAL HOSPITAL CARE,LEVL III: ICD-10-PCS | Mod: AI,GC,NTX, | Performed by: INTERNAL MEDICINE

## 2019-01-01 PROCEDURE — 99499 UNLISTED E&M SERVICE: CPT | Mod: NTX,,, | Performed by: STUDENT IN AN ORGANIZED HEALTH CARE EDUCATION/TRAINING PROGRAM

## 2019-01-01 PROCEDURE — 74150 CT ABDOMEN W/O CONTRAST: CPT | Mod: TC,TXP

## 2019-01-01 PROCEDURE — 36558 PR INSERT TUNNELED CV CATH W/O PORT OR PUMP: ICD-10-PCS | Mod: NTX,,, | Performed by: INTERNAL MEDICINE

## 2019-01-01 PROCEDURE — 82553 CREATINE MB FRACTION: CPT | Mod: NTX

## 2019-01-01 PROCEDURE — 96375 TX/PRO/DX INJ NEW DRUG ADDON: CPT | Mod: NTX

## 2019-01-01 PROCEDURE — 87427 SHIGA-LIKE TOXIN AG IA: CPT | Mod: NTX

## 2019-01-01 PROCEDURE — D9220A PRA ANESTHESIA: ICD-10-PCS | Mod: ANES,NTX,, | Performed by: ANESTHESIOLOGY

## 2019-01-01 PROCEDURE — 93010 ELECTROCARDIOGRAM REPORT: CPT | Mod: 76,NTX,, | Performed by: INTERNAL MEDICINE

## 2019-01-01 PROCEDURE — 82962 GLUCOSE BLOOD TEST: CPT | Mod: NTX | Performed by: SURGERY

## 2019-01-01 PROCEDURE — 99306 1ST NF CARE HIGH MDM 50: CPT | Mod: NTX,,, | Performed by: INTERNAL MEDICINE

## 2019-01-01 PROCEDURE — 36000706: Mod: NTX | Performed by: SURGERY

## 2019-01-01 PROCEDURE — 89055 LEUKOCYTE ASSESSMENT FECAL: CPT | Mod: NTX

## 2019-01-01 PROCEDURE — 83615 LACTATE (LD) (LDH) ENZYME: CPT | Mod: NTX

## 2019-01-01 PROCEDURE — 99215 OFFICE O/P EST HI 40 MIN: CPT | Mod: S$PBB,,, | Performed by: INTERNAL MEDICINE

## 2019-01-01 PROCEDURE — 88305 TISSUE EXAM BY PATHOLOGIST: CPT | Mod: NTX | Performed by: PATHOLOGY

## 2019-01-01 PROCEDURE — 99215 PR OFFICE/OUTPT VISIT, EST, LEVL V, 40-54 MIN: ICD-10-PCS | Mod: S$PBB,,, | Performed by: INTERNAL MEDICINE

## 2019-01-01 PROCEDURE — 27536 TREAT KNEE FRACTURE: CPT | Mod: 58,RT,NTX, | Performed by: ORTHOPAEDIC SURGERY

## 2019-01-01 PROCEDURE — 99233 PR SUBSEQUENT HOSPITAL CARE,LEVL III: ICD-10-PCS | Mod: 25,NTX,, | Performed by: NURSE PRACTITIONER

## 2019-01-01 PROCEDURE — D9220A PRA ANESTHESIA: Mod: ANES,NTX,, | Performed by: ANESTHESIOLOGY

## 2019-01-01 PROCEDURE — 36558 INSERT TUNNELED CV CATH: CPT | Mod: NTX | Performed by: INTERNAL MEDICINE

## 2019-01-01 PROCEDURE — 27201037 HC PRESSURE MONITORING SET UP: Mod: NTX

## 2019-01-01 PROCEDURE — 99999 PR PBB SHADOW E&M-EST. PATIENT-LVL III: ICD-10-PCS | Mod: PBBFAC,TXP,, | Performed by: INTERNAL MEDICINE

## 2019-01-01 PROCEDURE — 64447 PERIPHERAL BLOCK: ICD-10-PCS | Mod: 59,RT,NTX, | Performed by: ANESTHESIOLOGY

## 2019-01-01 PROCEDURE — 93971 EXTREMITY STUDY: CPT | Mod: NTX | Performed by: SURGERY

## 2019-01-01 PROCEDURE — 99217 PR OBSERVATION CARE DISCHARGE: ICD-10-PCS | Mod: NTX,,, | Performed by: NURSE PRACTITIONER

## 2019-01-01 PROCEDURE — 96367 TX/PROPH/DG ADDL SEQ IV INF: CPT | Mod: NTX

## 2019-01-01 PROCEDURE — 99284 EMERGENCY DEPT VISIT MOD MDM: CPT | Mod: 25,NTX

## 2019-01-01 PROCEDURE — C1769 GUIDE WIRE: HCPCS | Mod: NTX | Performed by: ORTHOPAEDIC SURGERY

## 2019-01-01 PROCEDURE — 99214 OFFICE O/P EST MOD 30 MIN: CPT | Mod: NTX,,, | Performed by: NURSE PRACTITIONER

## 2019-01-01 PROCEDURE — 84443 ASSAY THYROID STIM HORMONE: CPT | Mod: NTX

## 2019-01-01 PROCEDURE — 99214 OFFICE O/P EST MOD 30 MIN: CPT | Mod: S$PBB,NTX,, | Performed by: SURGERY

## 2019-01-01 PROCEDURE — 63600175 PHARM REV CODE 636 W HCPCS: Mod: JG,NTX | Performed by: FAMILY MEDICINE

## 2019-01-01 PROCEDURE — 87102 FUNGUS ISOLATION CULTURE: CPT | Mod: NTX

## 2019-01-01 PROCEDURE — 87205 SMEAR GRAM STAIN: CPT | Mod: NTX

## 2019-01-01 PROCEDURE — 36000707: Mod: NTX | Performed by: STUDENT IN AN ORGANIZED HEALTH CARE EDUCATION/TRAINING PROGRAM

## 2019-01-01 PROCEDURE — 93010 EKG 12-LEAD: ICD-10-PCS | Mod: 76,NTX,, | Performed by: INTERNAL MEDICINE

## 2019-01-01 PROCEDURE — 87340 HEPATITIS B SURFACE AG IA: CPT | Mod: NTX

## 2019-01-01 PROCEDURE — 85347 COAGULATION TIME ACTIVATED: CPT | Mod: NTX

## 2019-01-01 PROCEDURE — 36558 PR INSERT TUNNELED CV CATH W/O PORT OR PUMP: ICD-10-PCS | Mod: 50,53,NTX, | Performed by: STUDENT IN AN ORGANIZED HEALTH CARE EDUCATION/TRAINING PROGRAM

## 2019-01-01 PROCEDURE — 99231 PR SUBSEQUENT HOSPITAL CARE,LEVL I: ICD-10-PCS | Mod: NTX,,, | Performed by: ANESTHESIOLOGY

## 2019-01-01 PROCEDURE — 27201040 HC RC 50 FILTER: Mod: 91,NTX

## 2019-01-01 PROCEDURE — 99238 HOSP IP/OBS DSCHRG MGMT 30/<: CPT | Mod: NTX,,, | Performed by: HOSPITALIST

## 2019-01-01 PROCEDURE — 80047 BASIC METABLC PNL IONIZED CA: CPT | Mod: NTX,91

## 2019-01-01 PROCEDURE — 99284 EMERGENCY DEPT VISIT MOD MDM: CPT | Mod: NTX,,, | Performed by: PHYSICIAN ASSISTANT

## 2019-01-01 PROCEDURE — 99219 PR INITIAL OBSERVATION CARE,LEVL II: ICD-10-PCS | Mod: NTX,,, | Performed by: PHYSICIAN ASSISTANT

## 2019-01-01 PROCEDURE — D9220A PRA ANESTHESIA: ICD-10-PCS | Mod: CRNA,NTX,, | Performed by: NURSE ANESTHETIST, CERTIFIED REGISTERED

## 2019-01-01 PROCEDURE — 85610 PROTHROMBIN TIME: CPT | Mod: 91,NTX

## 2019-01-01 PROCEDURE — 36556 PR INSERT NON-TUNNEL CV CATH 5+ YRS OLD: ICD-10-PCS | Mod: NTX,,, | Performed by: STUDENT IN AN ORGANIZED HEALTH CARE EDUCATION/TRAINING PROGRAM

## 2019-01-01 PROCEDURE — 99222 1ST HOSP IP/OBS MODERATE 55: CPT | Mod: 25,NTX,, | Performed by: INTERNAL MEDICINE

## 2019-01-01 PROCEDURE — 27200966 HC CLOSED SUCTION SYSTEM: Mod: NTX

## 2019-01-01 PROCEDURE — 88342 IMHCHEM/IMCYTCHM 1ST ANTB: CPT | Mod: NTX | Performed by: PATHOLOGY

## 2019-01-01 PROCEDURE — 27536 PR OPEN RX BILAT TIB PLAT FX: ICD-10-PCS | Mod: 58,RT,NTX, | Performed by: ORTHOPAEDIC SURGERY

## 2019-01-01 PROCEDURE — 63600175 PHARM REV CODE 636 W HCPCS: Mod: JG,NTX | Performed by: PHYSICIAN ASSISTANT

## 2019-01-01 PROCEDURE — 87045 FECES CULTURE AEROBIC BACT: CPT | Mod: NTX

## 2019-01-01 PROCEDURE — 94002 VENT MGMT INPAT INIT DAY: CPT | Mod: NTX

## 2019-01-01 PROCEDURE — 99202 OFFICE O/P NEW SF 15 MIN: CPT | Mod: 57,NTX,, | Performed by: ORTHOPAEDIC SURGERY

## 2019-01-01 PROCEDURE — 37000008 HC ANESTHESIA 1ST 15 MINUTES: Mod: NTX | Performed by: STUDENT IN AN ORGANIZED HEALTH CARE EDUCATION/TRAINING PROGRAM

## 2019-01-01 PROCEDURE — 36556 PR INSERT NON-TUNNEL CV CATH 5+ YRS OLD: ICD-10-PCS | Mod: LT,NTX,, | Performed by: SURGERY

## 2019-01-01 PROCEDURE — 36000706: Mod: NTX | Performed by: STUDENT IN AN ORGANIZED HEALTH CARE EDUCATION/TRAINING PROGRAM

## 2019-01-01 PROCEDURE — 99223 PR INITIAL HOSPITAL CARE,LEVL III: ICD-10-PCS | Mod: NTX,,, | Performed by: STUDENT IN AN ORGANIZED HEALTH CARE EDUCATION/TRAINING PROGRAM

## 2019-01-01 PROCEDURE — 99225 PR SUBSEQUENT OBSERVATION CARE,LEVEL II: ICD-10-PCS | Mod: NTX,,, | Performed by: INTERNAL MEDICINE

## 2019-01-01 PROCEDURE — 63600175 PHARM REV CODE 636 W HCPCS: Mod: JG,NTX | Performed by: GENERAL PRACTICE

## 2019-01-01 PROCEDURE — D9220A PRA ANESTHESIA: Mod: NTX,,, | Performed by: ANESTHESIOLOGY

## 2019-01-01 PROCEDURE — 99900026 HC AIRWAY MAINTENANCE (STAT): Mod: NTX

## 2019-01-01 PROCEDURE — 99214 PR OFFICE/OUTPT VISIT, EST, LEVL IV, 30-39 MIN: ICD-10-PCS | Mod: S$PBB,NTX,, | Performed by: SURGERY

## 2019-01-01 PROCEDURE — C1751 CATH, INF, PER/CENT/MIDLINE: HCPCS | Mod: NTX

## 2019-01-01 PROCEDURE — 99231 SBSQ HOSP IP/OBS SF/LOW 25: CPT | Mod: GC,NTX,, | Performed by: INTERNAL MEDICINE

## 2019-01-01 PROCEDURE — 99202 PR OFFICE/OUTPT VISIT, NEW, LEVL II, 15-29 MIN: ICD-10-PCS | Mod: 57,NTX,, | Performed by: ORTHOPAEDIC SURGERY

## 2019-01-01 PROCEDURE — C1751 CATH, INF, PER/CENT/MIDLINE: HCPCS | Mod: NTX | Performed by: INTERNAL MEDICINE

## 2019-01-01 PROCEDURE — 36005 PR INJECTION PROC,EXTREMITY,VENOGRAPHY: ICD-10-PCS | Mod: GC,NTX,, | Performed by: SURGERY

## 2019-01-01 PROCEDURE — 96360 HYDRATION IV INFUSION INIT: CPT | Mod: NTX

## 2019-01-01 PROCEDURE — 99223 1ST HOSP IP/OBS HIGH 75: CPT | Mod: AI,GC,NTX, | Performed by: INTERNAL MEDICINE

## 2019-01-01 PROCEDURE — 90945 PR DIALYSIS, NOT HEMO, 1 EVAL: ICD-10-PCS | Mod: NTX,,, | Performed by: INTERNAL MEDICINE

## 2019-01-01 PROCEDURE — 64450 NJX AA&/STRD OTHER PN/BRANCH: CPT | Mod: 59,RT,NTX, | Performed by: ANESTHESIOLOGY

## 2019-01-01 PROCEDURE — C1769 GUIDE WIRE: HCPCS | Mod: TXP | Performed by: INTERNAL MEDICINE

## 2019-01-01 PROCEDURE — 99900058 HC 022 PAID UNDER SNF PPS

## 2019-01-01 PROCEDURE — 37000009 HC ANESTHESIA EA ADD 15 MINS: Mod: NTX | Performed by: SURGERY

## 2019-01-01 PROCEDURE — C1768 GRAFT, VASCULAR: HCPCS | Mod: NTX | Performed by: SURGERY

## 2019-01-01 PROCEDURE — 99291 CRITICAL CARE FIRST HOUR: CPT | Mod: 25,NTX

## 2019-01-01 PROCEDURE — 36620 INSERTION CATHETER ARTERY: CPT | Mod: 59,,, | Performed by: ANESTHESIOLOGY

## 2019-01-01 PROCEDURE — 99220 PR INITIAL OBSERVATION CARE,LEVL III: CPT | Mod: NTX,,, | Performed by: HOSPITALIST

## 2019-01-01 PROCEDURE — 36000707: Mod: NTX | Performed by: SURGERY

## 2019-01-01 PROCEDURE — 93005 ELECTROCARDIOGRAM TRACING: CPT | Mod: NTX | Performed by: INTERNAL MEDICINE

## 2019-01-01 PROCEDURE — 99999 PR PBB SHADOW E&M-EST. PATIENT-LVL III: CPT | Mod: PBBFAC,TXP,, | Performed by: INTERNAL MEDICINE

## 2019-01-01 DEVICE — SCREW L-S-D S/T 5.0X 32: Type: IMPLANTABLE DEVICE | Site: TIBIA | Status: FUNCTIONAL

## 2019-01-01 DEVICE — PRECISION RT CHRONIC CATHETER KIT,SYMMETRICAL TIP, REVERSE-TUNNELED,15 FR/CH (5.0 MM) X 55 CM
Type: IMPLANTABLE DEVICE | Site: GROIN | Status: FUNCTIONAL
Brand: PALINDROME

## 2019-01-01 DEVICE — IMPLANTABLE DEVICE: Type: IMPLANTABLE DEVICE | Site: TIBIA | Status: FUNCTIONAL

## 2019-01-01 DEVICE — SCREW CANN LOK CONIC 5X80 SS: Type: IMPLANTABLE DEVICE | Site: TIBIA | Status: FUNCTIONAL

## 2019-01-01 DEVICE — SCREW SCHANZ 5MMX200MM: Type: IMPLANTABLE DEVICE | Site: LEG | Status: FUNCTIONAL

## 2019-01-01 DEVICE — FIXATION EXTERNAL ROD CARBON: Type: IMPLANTABLE DEVICE | Site: LEG | Status: FUNCTIONAL

## 2019-01-01 DEVICE — IMPLANTABLE DEVICE: Type: IMPLANTABLE DEVICE | Site: LEG | Status: FUNCTIONAL

## 2019-01-01 DEVICE — PRECISION RT CHRONIC CATHETER KIT,SYMMETRICAL TIP, REVERSE-TUNNELED,15 FR/CH (5.0 MM) X 55 CM
Type: IMPLANTABLE DEVICE | Site: HEART | Status: FUNCTIONAL
Brand: PALINDROME

## 2019-01-01 DEVICE — SCREW L-S-D S/T 5.0X 42: Type: IMPLANTABLE DEVICE | Site: TIBIA | Status: FUNCTIONAL

## 2019-01-01 DEVICE — SCREW L-S-D S/T 5.0X 36.: Type: IMPLANTABLE DEVICE | Site: TIBIA | Status: FUNCTIONAL

## 2019-01-01 DEVICE — SCREW L-S-D S/T 5.0X 34.: Type: IMPLANTABLE DEVICE | Site: TIBIA | Status: FUNCTIONAL

## 2019-01-01 DEVICE — SCREW L-S-D S/T 5.0X 60: Type: IMPLANTABLE DEVICE | Site: TIBIA | Status: FUNCTIONAL

## 2019-01-01 DEVICE — ROD CARBON FIBER 11MM X 300MM: Type: IMPLANTABLE DEVICE | Site: LEG | Status: FUNCTIONAL

## 2019-01-01 DEVICE — GRAFT ACUSEAL CARDIO 45CM: Type: IMPLANTABLE DEVICE | Site: ARTERIAL | Status: FUNCTIONAL

## 2019-01-01 DEVICE — SCREW BONE LK 5.0X70MM: Type: IMPLANTABLE DEVICE | Site: TIBIA | Status: FUNCTIONAL

## 2019-01-01 DEVICE — CLAMP COMBINATION / LG EXT FIX: Type: IMPLANTABLE DEVICE | Site: LEG | Status: FUNCTIONAL

## 2019-01-01 DEVICE — CLAMP LG 4 POSITION MULTI-PIN: Type: IMPLANTABLE DEVICE | Site: LEG | Status: FUNCTIONAL

## 2019-01-01 RX ORDER — MIDODRINE HYDROCHLORIDE 5 MG/1
15 TABLET ORAL
Status: COMPLETED | OUTPATIENT
Start: 2019-01-01 | End: 2019-01-01

## 2019-01-01 RX ORDER — ONDANSETRON 2 MG/ML
INJECTION INTRAMUSCULAR; INTRAVENOUS
Status: DISCONTINUED | OUTPATIENT
Start: 2019-01-01 | End: 2019-01-01

## 2019-01-01 RX ORDER — SODIUM BICARBONATE 650 MG/1
650 TABLET ORAL 2 TIMES DAILY
Qty: 60 TABLET | Refills: 11 | COMMUNITY
Start: 2019-01-01 | End: 2019-01-01 | Stop reason: HOSPADM

## 2019-01-01 RX ORDER — SODIUM CHLORIDE 9 MG/ML
INJECTION, SOLUTION INTRAVENOUS CONTINUOUS PRN
Status: DISCONTINUED | OUTPATIENT
Start: 2019-01-01 | End: 2019-01-01

## 2019-01-01 RX ORDER — POTASSIUM CHLORIDE 20 MEQ/1
20 TABLET, EXTENDED RELEASE ORAL ONCE
Status: COMPLETED | OUTPATIENT
Start: 2019-01-01 | End: 2019-01-01

## 2019-01-01 RX ORDER — ESMOLOL HYDROCHLORIDE 10 MG/ML
INJECTION INTRAVENOUS
Status: DISCONTINUED | OUTPATIENT
Start: 2019-01-01 | End: 2019-01-01

## 2019-01-01 RX ORDER — SODIUM CHLORIDE 9 MG/ML
INJECTION, SOLUTION INTRAVENOUS ONCE
Status: COMPLETED | OUTPATIENT
Start: 2019-01-01 | End: 2019-01-01

## 2019-01-01 RX ORDER — SODIUM CHLORIDE 9 MG/ML
INJECTION, SOLUTION INTRAVENOUS ONCE
Status: DISCONTINUED | OUTPATIENT
Start: 2019-01-01 | End: 2019-01-01 | Stop reason: HOSPADM

## 2019-01-01 RX ORDER — ACETAMINOPHEN 325 MG/1
650 TABLET ORAL EVERY 6 HOURS PRN
Status: CANCELLED | OUTPATIENT
Start: 2019-01-01

## 2019-01-01 RX ORDER — IRBESARTAN 75 MG/1
75 TABLET ORAL DAILY
Qty: 90 TABLET | Refills: 3 | Status: ON HOLD | OUTPATIENT
Start: 2019-01-01 | End: 2019-01-01 | Stop reason: HOSPADM

## 2019-01-01 RX ORDER — POTASSIUM CHLORIDE 20 MEQ/1
20 TABLET, EXTENDED RELEASE ORAL DAILY PRN
Status: DISCONTINUED | OUTPATIENT
Start: 2019-01-01 | End: 2019-01-01 | Stop reason: HOSPADM

## 2019-01-01 RX ORDER — PROMETHAZINE HYDROCHLORIDE 12.5 MG/1
25 TABLET ORAL EVERY 6 HOURS PRN
Status: DISCONTINUED | OUTPATIENT
Start: 2019-01-01 | End: 2019-01-01 | Stop reason: HOSPADM

## 2019-01-01 RX ORDER — MORPHINE SULFATE 4 MG/ML
4 INJECTION, SOLUTION INTRAMUSCULAR; INTRAVENOUS
Status: COMPLETED | OUTPATIENT
Start: 2019-01-01 | End: 2019-01-01

## 2019-01-01 RX ORDER — MIDAZOLAM HYDROCHLORIDE 1 MG/ML
INJECTION, SOLUTION INTRAMUSCULAR; INTRAVENOUS
Status: DISCONTINUED | OUTPATIENT
Start: 2019-01-01 | End: 2019-01-01

## 2019-01-01 RX ORDER — LOPERAMIDE HYDROCHLORIDE 2 MG/1
2 CAPSULE ORAL 3 TIMES DAILY
Status: DISCONTINUED | OUTPATIENT
Start: 2019-01-01 | End: 2019-01-01

## 2019-01-01 RX ORDER — HYDROMORPHONE HYDROCHLORIDE 2 MG/ML
1 INJECTION, SOLUTION INTRAMUSCULAR; INTRAVENOUS; SUBCUTANEOUS EVERY 4 HOURS PRN
Status: DISCONTINUED | OUTPATIENT
Start: 2019-01-01 | End: 2019-01-01

## 2019-01-01 RX ORDER — LANOLIN ALCOHOL/MO/W.PET/CERES
400 CREAM (GRAM) TOPICAL 3 TIMES DAILY
Status: DISCONTINUED | OUTPATIENT
Start: 2019-01-01 | End: 2019-01-01 | Stop reason: HOSPADM

## 2019-01-01 RX ORDER — CINACALCET 30 MG/1
60 TABLET, FILM COATED ORAL DAILY
Status: DISCONTINUED | OUTPATIENT
Start: 2019-01-01 | End: 2019-01-01 | Stop reason: HOSPADM

## 2019-01-01 RX ORDER — PANTOPRAZOLE SODIUM 40 MG/1
40 TABLET, DELAYED RELEASE ORAL DAILY
Status: CANCELLED | OUTPATIENT
Start: 2019-01-01

## 2019-01-01 RX ORDER — GABAPENTIN 100 MG/1
200 CAPSULE ORAL DAILY
Status: DISCONTINUED | OUTPATIENT
Start: 2019-01-01 | End: 2019-01-01

## 2019-01-01 RX ORDER — CLOPIDOGREL BISULFATE 75 MG/1
75 TABLET ORAL DAILY
Status: DISCONTINUED | OUTPATIENT
Start: 2019-01-01 | End: 2019-01-01 | Stop reason: HOSPADM

## 2019-01-01 RX ORDER — PHENYLEPHRINE HYDROCHLORIDE 10 MG/ML
INJECTION INTRAVENOUS
Status: DISCONTINUED | OUTPATIENT
Start: 2019-01-01 | End: 2019-01-01

## 2019-01-01 RX ORDER — MIDODRINE HYDROCHLORIDE 2.5 MG/1
10 TABLET ORAL 3 TIMES DAILY
Status: DISCONTINUED | OUTPATIENT
Start: 2019-01-01 | End: 2019-01-01

## 2019-01-01 RX ORDER — HYDROCODONE BITARTRATE AND ACETAMINOPHEN 500; 5 MG/1; MG/1
TABLET ORAL
Status: DISCONTINUED | OUTPATIENT
Start: 2019-01-01 | End: 2019-01-01

## 2019-01-01 RX ORDER — SEVELAMER CARBONATE 800 MG/1
800 TABLET, FILM COATED ORAL
Status: DISCONTINUED | OUTPATIENT
Start: 2019-01-01 | End: 2019-01-01

## 2019-01-01 RX ORDER — CALCITRIOL 0.25 UG/1
0.5 CAPSULE ORAL DAILY
Status: DISCONTINUED | OUTPATIENT
Start: 2019-01-01 | End: 2019-01-01 | Stop reason: HOSPADM

## 2019-01-01 RX ORDER — CEFEPIME HYDROCHLORIDE 1 G/1
1 INJECTION, POWDER, FOR SOLUTION INTRAMUSCULAR; INTRAVENOUS EVERY 24 HOURS
Status: DISCONTINUED | OUTPATIENT
Start: 2019-01-01 | End: 2019-01-01

## 2019-01-01 RX ORDER — LIDOCAINE HCL/PF 100 MG/5ML
SYRINGE (ML) INTRAVENOUS
Status: DISCONTINUED | OUTPATIENT
Start: 2019-01-01 | End: 2019-01-01

## 2019-01-01 RX ORDER — CEFEPIME HYDROCHLORIDE 1 G/50ML
1 INJECTION, SOLUTION INTRAVENOUS
Status: DISCONTINUED | OUTPATIENT
Start: 2019-01-01 | End: 2019-01-01

## 2019-01-01 RX ORDER — ASPIRIN 81 MG/1
81 TABLET ORAL DAILY
Status: DISCONTINUED | OUTPATIENT
Start: 2019-01-01 | End: 2019-01-01

## 2019-01-01 RX ORDER — HEPARIN SODIUM 5000 [USP'U]/ML
5000 INJECTION, SOLUTION INTRAVENOUS; SUBCUTANEOUS EVERY 8 HOURS
Status: DISCONTINUED | OUTPATIENT
Start: 2019-01-01 | End: 2019-01-01

## 2019-01-01 RX ORDER — CINACALCET 30 MG/1
90 TABLET, FILM COATED ORAL DAILY
Status: CANCELLED | OUTPATIENT
Start: 2019-01-01

## 2019-01-01 RX ORDER — ACETAMINOPHEN 325 MG/1
650 TABLET ORAL EVERY 6 HOURS PRN
Status: DISCONTINUED | OUTPATIENT
Start: 2019-01-01 | End: 2019-01-01 | Stop reason: HOSPADM

## 2019-01-01 RX ORDER — FERROUS SULFATE 325(65) MG
325 TABLET, DELAYED RELEASE (ENTERIC COATED) ORAL DAILY
Status: DISCONTINUED | OUTPATIENT
Start: 2019-01-01 | End: 2019-01-01

## 2019-01-01 RX ORDER — ASPIRIN 81 MG/1
81 TABLET ORAL 2 TIMES DAILY
Status: DISCONTINUED | OUTPATIENT
Start: 2019-01-01 | End: 2019-01-01

## 2019-01-01 RX ORDER — MAGNESIUM SULFATE HEPTAHYDRATE 40 MG/ML
2 INJECTION, SOLUTION INTRAVENOUS
Status: DISCONTINUED | OUTPATIENT
Start: 2019-01-01 | End: 2019-01-01

## 2019-01-01 RX ORDER — PANTOPRAZOLE SODIUM 40 MG/10ML
80 INJECTION, POWDER, LYOPHILIZED, FOR SOLUTION INTRAVENOUS ONCE
Status: COMPLETED | OUTPATIENT
Start: 2019-01-01 | End: 2019-01-01

## 2019-01-01 RX ORDER — SODIUM CHLORIDE 0.9 % (FLUSH) 0.9 %
5 SYRINGE (ML) INJECTION
Status: DISCONTINUED | OUTPATIENT
Start: 2019-01-01 | End: 2019-01-01 | Stop reason: HOSPADM

## 2019-01-01 RX ORDER — PROPOFOL 10 MG/ML
5 INJECTION, EMULSION INTRAVENOUS CONTINUOUS
Status: DISCONTINUED | OUTPATIENT
Start: 2019-01-01 | End: 2019-01-01

## 2019-01-01 RX ORDER — SUCRALFATE 1 G/1
1 TABLET ORAL
Status: DISCONTINUED | OUTPATIENT
Start: 2019-01-01 | End: 2019-01-01 | Stop reason: HOSPADM

## 2019-01-01 RX ORDER — HYDROMORPHONE HYDROCHLORIDE 1 MG/ML
1 INJECTION, SOLUTION INTRAMUSCULAR; INTRAVENOUS; SUBCUTANEOUS
Status: COMPLETED | OUTPATIENT
Start: 2019-01-01 | End: 2019-01-01

## 2019-01-01 RX ORDER — LORAZEPAM 2 MG/ML
1 INJECTION INTRAMUSCULAR EVERY 30 MIN PRN
Status: DISCONTINUED | OUTPATIENT
Start: 2019-01-01 | End: 2019-07-21 | Stop reason: HOSPADM

## 2019-01-01 RX ORDER — DEXMEDETOMIDINE HYDROCHLORIDE 4 UG/ML
0.2 INJECTION, SOLUTION INTRAVENOUS CONTINUOUS
Status: DISCONTINUED | OUTPATIENT
Start: 2019-01-01 | End: 2019-01-01

## 2019-01-01 RX ORDER — GLUCAGON 1 MG
1 KIT INJECTION
Status: DISCONTINUED | OUTPATIENT
Start: 2019-01-01 | End: 2019-01-01 | Stop reason: HOSPADM

## 2019-01-01 RX ORDER — PROCHLORPERAZINE EDISYLATE 5 MG/ML
10 INJECTION INTRAMUSCULAR; INTRAVENOUS ONCE
Status: COMPLETED | OUTPATIENT
Start: 2019-01-01 | End: 2019-01-01

## 2019-01-01 RX ORDER — CALCITRIOL 0.25 UG/1
0.5 CAPSULE ORAL DAILY
COMMUNITY
End: 2019-01-01

## 2019-01-01 RX ORDER — HEPARIN SODIUM 1000 [USP'U]/ML
2000 INJECTION, SOLUTION INTRAVENOUS; SUBCUTANEOUS
Status: DISCONTINUED | OUTPATIENT
Start: 2019-01-01 | End: 2019-01-01

## 2019-01-01 RX ORDER — HYDROCODONE BITARTRATE AND ACETAMINOPHEN 500; 5 MG/1; MG/1
TABLET ORAL
Status: DISCONTINUED | OUTPATIENT
Start: 2019-01-01 | End: 2019-01-01 | Stop reason: HOSPADM

## 2019-01-01 RX ORDER — FAMOTIDINE 10 MG/ML
20 INJECTION INTRAVENOUS DAILY
Status: DISCONTINUED | OUTPATIENT
Start: 2019-01-01 | End: 2019-01-01

## 2019-01-01 RX ORDER — IPRATROPIUM BROMIDE AND ALBUTEROL SULFATE 2.5; .5 MG/3ML; MG/3ML
3 SOLUTION RESPIRATORY (INHALATION) EVERY 4 HOURS PRN
Status: DISCONTINUED | OUTPATIENT
Start: 2019-01-01 | End: 2019-01-01 | Stop reason: HOSPADM

## 2019-01-01 RX ORDER — PROPOFOL 10 MG/ML
VIAL (ML) INTRAVENOUS
Status: DISCONTINUED | OUTPATIENT
Start: 2019-01-01 | End: 2019-01-01

## 2019-01-01 RX ORDER — GABAPENTIN 300 MG/1
300 CAPSULE ORAL 2 TIMES DAILY
Status: DISCONTINUED | OUTPATIENT
Start: 2019-01-01 | End: 2019-01-01 | Stop reason: HOSPADM

## 2019-01-01 RX ORDER — LIDOCAINE HYDROCHLORIDE 10 MG/ML
INJECTION INFILTRATION; PERINEURAL
Status: COMPLETED
Start: 2019-01-01 | End: 2019-01-01

## 2019-01-01 RX ORDER — CARVEDILOL 3.12 MG/1
3.12 TABLET ORAL
Status: DISCONTINUED | OUTPATIENT
Start: 2019-01-01 | End: 2019-01-01

## 2019-01-01 RX ORDER — SODIUM CHLORIDE 9 MG/ML
INJECTION, SOLUTION INTRAVENOUS ONCE
Status: DISCONTINUED | OUTPATIENT
Start: 2019-01-01 | End: 2019-01-01

## 2019-01-01 RX ORDER — ASPIRIN 81 MG/1
81 TABLET ORAL ONCE
Status: COMPLETED | OUTPATIENT
Start: 2019-01-01 | End: 2019-01-01

## 2019-01-01 RX ORDER — LOPERAMIDE HYDROCHLORIDE 2 MG/1
2 CAPSULE ORAL
Status: DISCONTINUED | OUTPATIENT
Start: 2019-01-01 | End: 2019-01-01 | Stop reason: HOSPADM

## 2019-01-01 RX ORDER — ROPIVACAINE HYDROCHLORIDE 2 MG/ML
6 INJECTION, SOLUTION EPIDURAL; INFILTRATION; PERINEURAL CONTINUOUS
Status: DISCONTINUED | OUTPATIENT
Start: 2019-01-01 | End: 2019-01-01

## 2019-01-01 RX ORDER — INSULIN ASPART 100 [IU]/ML
0-5 INJECTION, SOLUTION INTRAVENOUS; SUBCUTANEOUS
Status: DISCONTINUED | OUTPATIENT
Start: 2019-01-01 | End: 2019-01-01 | Stop reason: HOSPADM

## 2019-01-01 RX ORDER — ATORVASTATIN CALCIUM 20 MG/1
40 TABLET, FILM COATED ORAL NIGHTLY
Status: DISCONTINUED | OUTPATIENT
Start: 2019-01-01 | End: 2019-01-01

## 2019-01-01 RX ORDER — ATORVASTATIN CALCIUM 20 MG/1
40 TABLET, FILM COATED ORAL NIGHTLY
Status: DISCONTINUED | OUTPATIENT
Start: 2019-01-01 | End: 2019-01-01 | Stop reason: HOSPADM

## 2019-01-01 RX ORDER — HEPARIN SODIUM 1000 [USP'U]/ML
2000 INJECTION, SOLUTION INTRAVENOUS; SUBCUTANEOUS
Status: DISCONTINUED | OUTPATIENT
Start: 2019-01-01 | End: 2019-01-01 | Stop reason: HOSPADM

## 2019-01-01 RX ORDER — ONDANSETRON 8 MG/1
8 TABLET, ORALLY DISINTEGRATING ORAL EVERY 8 HOURS PRN
Status: DISCONTINUED | OUTPATIENT
Start: 2019-01-01 | End: 2019-01-01 | Stop reason: HOSPADM

## 2019-01-01 RX ORDER — LIDOCAINE HYDROCHLORIDE 20 MG/ML
INJECTION, SOLUTION EPIDURAL; INFILTRATION; INTRACAUDAL; PERINEURAL
Status: DISCONTINUED | OUTPATIENT
Start: 2019-01-01 | End: 2019-01-01 | Stop reason: HOSPADM

## 2019-01-01 RX ORDER — POTASSIUM CHLORIDE 20 MEQ/15ML
20 SOLUTION ORAL ONCE
Status: COMPLETED | OUTPATIENT
Start: 2019-01-01 | End: 2019-01-01

## 2019-01-01 RX ORDER — MIDODRINE HYDROCHLORIDE 2.5 MG/1
10 TABLET ORAL ONCE
Status: COMPLETED | OUTPATIENT
Start: 2019-01-01 | End: 2019-01-01

## 2019-01-01 RX ORDER — IBUPROFEN 200 MG
16 TABLET ORAL
Status: DISCONTINUED | OUTPATIENT
Start: 2019-01-01 | End: 2019-01-01 | Stop reason: HOSPADM

## 2019-01-01 RX ORDER — GABAPENTIN 300 MG/1
900 CAPSULE ORAL 3 TIMES DAILY
Status: DISCONTINUED | OUTPATIENT
Start: 2019-01-01 | End: 2019-01-01

## 2019-01-01 RX ORDER — CARVEDILOL 3.12 MG/1
3.12 TABLET ORAL 2 TIMES DAILY
Status: DISCONTINUED | OUTPATIENT
Start: 2019-01-01 | End: 2019-01-01

## 2019-01-01 RX ORDER — MORPHINE SULFATE 4 MG/ML
1 INJECTION, SOLUTION INTRAMUSCULAR; INTRAVENOUS ONCE
Status: COMPLETED | OUTPATIENT
Start: 2019-01-01 | End: 2019-01-01

## 2019-01-01 RX ORDER — SODIUM CHLORIDE 9 MG/ML
INJECTION, SOLUTION INTRAVENOUS
Status: DISCONTINUED | OUTPATIENT
Start: 2019-01-01 | End: 2019-01-01 | Stop reason: HOSPADM

## 2019-01-01 RX ORDER — HYDROCODONE BITARTRATE AND ACETAMINOPHEN 500; 5 MG/1; MG/1
TABLET ORAL CONTINUOUS
Status: DISCONTINUED | OUTPATIENT
Start: 2019-01-01 | End: 2019-01-01

## 2019-01-01 RX ORDER — CEFEPIME HYDROCHLORIDE 2 G/1
2 INJECTION, POWDER, FOR SOLUTION INTRAVENOUS
Status: COMPLETED | OUTPATIENT
Start: 2019-01-01 | End: 2019-01-01

## 2019-01-01 RX ORDER — METOPROLOL SUCCINATE 25 MG/1
25 TABLET, EXTENDED RELEASE ORAL DAILY
Status: DISCONTINUED | OUTPATIENT
Start: 2019-01-01 | End: 2019-01-01

## 2019-01-01 RX ORDER — SIMETHICONE 125 MG
125 TABLET,CHEWABLE ORAL EVERY 6 HOURS PRN
Status: DISCONTINUED | OUTPATIENT
Start: 2019-01-01 | End: 2019-01-01 | Stop reason: HOSPADM

## 2019-01-01 RX ORDER — ONDANSETRON 2 MG/ML
4 INJECTION INTRAMUSCULAR; INTRAVENOUS DAILY PRN
Status: DISCONTINUED | OUTPATIENT
Start: 2019-01-01 | End: 2019-01-01 | Stop reason: HOSPADM

## 2019-01-01 RX ORDER — HEPARIN SODIUM 5000 [USP'U]/ML
5000 INJECTION, SOLUTION INTRAVENOUS; SUBCUTANEOUS EVERY 12 HOURS
Status: DISCONTINUED | OUTPATIENT
Start: 2019-01-01 | End: 2019-01-01

## 2019-01-01 RX ORDER — CARVEDILOL 3.12 MG/1
3.12 TABLET ORAL NIGHTLY
Status: DISCONTINUED | OUTPATIENT
Start: 2019-01-01 | End: 2019-01-01

## 2019-01-01 RX ORDER — IRBESARTAN 75 MG/1
75 TABLET ORAL
Status: DISCONTINUED | OUTPATIENT
Start: 2019-01-01 | End: 2019-01-01

## 2019-01-01 RX ORDER — LANOLIN ALCOHOL/MO/W.PET/CERES
400 CREAM (GRAM) TOPICAL DAILY
Status: DISCONTINUED | OUTPATIENT
Start: 2019-01-01 | End: 2019-01-01

## 2019-01-01 RX ORDER — OXYCODONE HYDROCHLORIDE 5 MG/1
5 TABLET ORAL EVERY 6 HOURS PRN
Status: DISCONTINUED | OUTPATIENT
Start: 2019-01-01 | End: 2019-01-01 | Stop reason: HOSPADM

## 2019-01-01 RX ORDER — GABAPENTIN 100 MG/1
100 CAPSULE ORAL 3 TIMES DAILY
Status: DISCONTINUED | OUTPATIENT
Start: 2019-01-01 | End: 2019-01-01 | Stop reason: HOSPADM

## 2019-01-01 RX ORDER — MIDODRINE HYDROCHLORIDE 10 MG/1
10 TABLET ORAL
Qty: 90 TABLET | Refills: 2 | OUTPATIENT
Start: 2019-01-01

## 2019-01-01 RX ORDER — PANTOPRAZOLE SODIUM 40 MG/1
40 TABLET, DELAYED RELEASE ORAL DAILY
Status: DISCONTINUED | OUTPATIENT
Start: 2019-01-01 | End: 2019-01-01 | Stop reason: HOSPADM

## 2019-01-01 RX ORDER — ENOXAPARIN SODIUM 100 MG/ML
40 INJECTION SUBCUTANEOUS EVERY 24 HOURS
Status: DISCONTINUED | OUTPATIENT
Start: 2019-01-01 | End: 2019-01-01

## 2019-01-01 RX ORDER — SEVELAMER CARBONATE 800 MG/1
800 TABLET, FILM COATED ORAL
Status: DISCONTINUED | OUTPATIENT
Start: 2019-01-01 | End: 2019-01-01 | Stop reason: HOSPADM

## 2019-01-01 RX ORDER — PROMETHAZINE HYDROCHLORIDE 25 MG/1
25 TABLET ORAL EVERY 6 HOURS PRN
Status: DISCONTINUED | OUTPATIENT
Start: 2019-01-01 | End: 2019-01-01 | Stop reason: HOSPADM

## 2019-01-01 RX ORDER — INDOMETHACIN 25 MG/1
50 CAPSULE ORAL ONCE
Status: COMPLETED | OUTPATIENT
Start: 2019-01-01 | End: 2019-01-01

## 2019-01-01 RX ORDER — CALCIUM CARBONATE 200(500)MG
500 TABLET,CHEWABLE ORAL 2 TIMES DAILY PRN
Status: CANCELLED | OUTPATIENT
Start: 2019-01-01

## 2019-01-01 RX ORDER — ETOMIDATE 2 MG/ML
INJECTION INTRAVENOUS
Status: DISCONTINUED | OUTPATIENT
Start: 2019-01-01 | End: 2019-01-01

## 2019-01-01 RX ORDER — OXYCODONE HYDROCHLORIDE 10 MG/1
10 TABLET ORAL EVERY 4 HOURS PRN
Status: DISCONTINUED | OUTPATIENT
Start: 2019-01-01 | End: 2019-01-01

## 2019-01-01 RX ORDER — MAG HYDROX/ALUMINUM HYD/SIMETH 200-200-20
30 SUSPENSION, ORAL (FINAL DOSE FORM) ORAL EVERY 4 HOURS PRN
Status: DISCONTINUED | OUTPATIENT
Start: 2019-01-01 | End: 2019-01-01 | Stop reason: HOSPADM

## 2019-01-01 RX ORDER — CEFAZOLIN SODIUM 1 G/3ML
INJECTION, POWDER, FOR SOLUTION INTRAMUSCULAR; INTRAVENOUS
Status: DISCONTINUED | OUTPATIENT
Start: 2019-01-01 | End: 2019-01-01

## 2019-01-01 RX ORDER — ACETAMINOPHEN 325 MG/1
650 TABLET ORAL EVERY 4 HOURS PRN
Status: CANCELLED | OUTPATIENT
Start: 2019-01-01

## 2019-01-01 RX ORDER — SODIUM CHLORIDE 0.9 % (FLUSH) 0.9 %
10 SYRINGE (ML) INJECTION
Status: DISCONTINUED | OUTPATIENT
Start: 2019-01-01 | End: 2019-01-01

## 2019-01-01 RX ORDER — IBUPROFEN 200 MG
24 TABLET ORAL
Status: DISCONTINUED | OUTPATIENT
Start: 2019-01-01 | End: 2019-01-01

## 2019-01-01 RX ORDER — GABAPENTIN 100 MG/1
100 CAPSULE ORAL 3 TIMES DAILY
Status: DISCONTINUED | OUTPATIENT
Start: 2019-01-01 | End: 2019-01-01

## 2019-01-01 RX ORDER — MAGNESIUM SULFATE HEPTAHYDRATE 40 MG/ML
2 INJECTION, SOLUTION INTRAVENOUS
Status: ACTIVE | OUTPATIENT
Start: 2019-01-01 | End: 2019-01-01

## 2019-01-01 RX ORDER — PROCHLORPERAZINE EDISYLATE 5 MG/ML
10 INJECTION INTRAMUSCULAR; INTRAVENOUS EVERY 8 HOURS
Status: DISCONTINUED | OUTPATIENT
Start: 2019-01-01 | End: 2019-01-01 | Stop reason: HOSPADM

## 2019-01-01 RX ORDER — CALCIUM CARBONATE 200(500)MG
500 TABLET,CHEWABLE ORAL 2 TIMES DAILY PRN
Status: DISCONTINUED | OUTPATIENT
Start: 2019-01-01 | End: 2019-01-01 | Stop reason: HOSPADM

## 2019-01-01 RX ORDER — IBUPROFEN 200 MG
24 TABLET ORAL
Status: DISCONTINUED | OUTPATIENT
Start: 2019-01-01 | End: 2019-01-01 | Stop reason: HOSPADM

## 2019-01-01 RX ORDER — VANCOMYCIN HCL IN 5 % DEXTROSE 1G/250ML
1000 PLASTIC BAG, INJECTION (ML) INTRAVENOUS ONCE
Status: COMPLETED | OUTPATIENT
Start: 2019-01-01 | End: 2019-01-01

## 2019-01-01 RX ORDER — GABAPENTIN 100 MG/1
100 CAPSULE ORAL 3 TIMES DAILY
Status: CANCELLED | OUTPATIENT
Start: 2019-01-01

## 2019-01-01 RX ORDER — FENTANYL CITRATE 50 UG/ML
INJECTION, SOLUTION INTRAMUSCULAR; INTRAVENOUS
Status: DISCONTINUED | OUTPATIENT
Start: 2019-01-01 | End: 2019-01-01 | Stop reason: HOSPADM

## 2019-01-01 RX ORDER — HEPARIN SODIUM 1000 [USP'U]/ML
INJECTION, SOLUTION INTRAVENOUS; SUBCUTANEOUS
Status: DISCONTINUED | OUTPATIENT
Start: 2019-01-01 | End: 2019-01-01 | Stop reason: HOSPADM

## 2019-01-01 RX ORDER — CLOPIDOGREL BISULFATE 75 MG/1
75 TABLET ORAL DAILY
Status: CANCELLED | OUTPATIENT
Start: 2019-01-01

## 2019-01-01 RX ORDER — IBUPROFEN 200 MG
16 TABLET ORAL
Status: CANCELLED | OUTPATIENT
Start: 2019-01-01

## 2019-01-01 RX ORDER — PROMETHAZINE HYDROCHLORIDE 12.5 MG/1
12.5 TABLET ORAL EVERY 6 HOURS PRN
Status: DISCONTINUED | OUTPATIENT
Start: 2019-01-01 | End: 2019-01-01 | Stop reason: HOSPADM

## 2019-01-01 RX ORDER — ERGOCALCIFEROL 1.25 MG/1
50000 CAPSULE ORAL
Status: ON HOLD | COMMUNITY
Start: 2018-01-01 | End: 2019-01-01 | Stop reason: HOSPADM

## 2019-01-01 RX ORDER — IRBESARTAN 75 MG/1
75 TABLET ORAL DAILY
Status: DISCONTINUED | OUTPATIENT
Start: 2019-01-01 | End: 2019-01-01 | Stop reason: HOSPADM

## 2019-01-01 RX ORDER — RAMELTEON 8 MG/1
8 TABLET ORAL NIGHTLY PRN
Status: CANCELLED | OUTPATIENT
Start: 2019-01-01

## 2019-01-01 RX ORDER — MIDAZOLAM HYDROCHLORIDE 1 MG/ML
INJECTION, SOLUTION INTRAMUSCULAR; INTRAVENOUS
Status: DISCONTINUED | OUTPATIENT
Start: 2019-01-01 | End: 2019-01-01 | Stop reason: HOSPADM

## 2019-01-01 RX ORDER — LISINOPRIL 5 MG/1
5 TABLET ORAL DAILY
Status: DISCONTINUED | OUTPATIENT
Start: 2019-01-01 | End: 2019-01-01

## 2019-01-01 RX ORDER — MIDODRINE HYDROCHLORIDE 2.5 MG/1
10 TABLET ORAL ONCE
Status: DISCONTINUED | OUTPATIENT
Start: 2019-01-01 | End: 2019-01-01

## 2019-01-01 RX ORDER — GABAPENTIN 100 MG/1
100 CAPSULE ORAL NIGHTLY
Status: DISCONTINUED | OUTPATIENT
Start: 2019-01-01 | End: 2019-01-01

## 2019-01-01 RX ORDER — GLUCAGON 1 MG
1 KIT INJECTION
Status: CANCELLED | OUTPATIENT
Start: 2019-01-01

## 2019-01-01 RX ORDER — POLYETHYLENE GLYCOL 3350 17 G/17G
17 POWDER, FOR SOLUTION ORAL 2 TIMES DAILY PRN
Status: DISCONTINUED | OUTPATIENT
Start: 2019-01-01 | End: 2019-01-01 | Stop reason: HOSPADM

## 2019-01-01 RX ORDER — SENNOSIDES 8.6 MG/1
8.6 TABLET ORAL NIGHTLY
Status: DISCONTINUED | OUTPATIENT
Start: 2019-01-01 | End: 2019-01-01

## 2019-01-01 RX ORDER — IBUPROFEN 200 MG
24 TABLET ORAL
Status: CANCELLED | OUTPATIENT
Start: 2019-01-01

## 2019-01-01 RX ORDER — SODIUM CHLORIDE, SODIUM LACTATE, POTASSIUM CHLORIDE, CALCIUM CHLORIDE 600; 310; 30; 20 MG/100ML; MG/100ML; MG/100ML; MG/100ML
INJECTION, SOLUTION INTRAVENOUS CONTINUOUS
Status: DISCONTINUED | OUTPATIENT
Start: 2019-01-01 | End: 2019-01-01

## 2019-01-01 RX ORDER — HEPARIN SODIUM 1000 [USP'U]/ML
1000 INJECTION, SOLUTION INTRAVENOUS; SUBCUTANEOUS
Status: DISCONTINUED | OUTPATIENT
Start: 2019-01-01 | End: 2019-01-01 | Stop reason: HOSPADM

## 2019-01-01 RX ORDER — ACETAMINOPHEN 325 MG/1
650 TABLET ORAL EVERY 8 HOURS PRN
Status: DISCONTINUED | OUTPATIENT
Start: 2019-01-01 | End: 2019-01-01 | Stop reason: HOSPADM

## 2019-01-01 RX ORDER — FERROUS SULFATE 325(65) MG
325 TABLET, DELAYED RELEASE (ENTERIC COATED) ORAL 2 TIMES DAILY
Refills: 0 | Status: ON HOLD | COMMUNITY
Start: 2019-01-01 | End: 2019-01-01 | Stop reason: HOSPADM

## 2019-01-01 RX ORDER — CETIRIZINE HYDROCHLORIDE 5 MG/1
5 TABLET ORAL DAILY
Status: DISCONTINUED | OUTPATIENT
Start: 2019-01-01 | End: 2019-01-01

## 2019-01-01 RX ORDER — PANTOPRAZOLE SODIUM 40 MG/1
40 TABLET, DELAYED RELEASE ORAL DAILY
Status: DISCONTINUED | OUTPATIENT
Start: 2019-01-01 | End: 2019-01-01

## 2019-01-01 RX ORDER — VANCOMYCIN HYDROCHLORIDE 1 G/20ML
INJECTION, POWDER, LYOPHILIZED, FOR SOLUTION INTRAVENOUS
Status: DISCONTINUED | OUTPATIENT
Start: 2019-01-01 | End: 2019-01-01 | Stop reason: HOSPADM

## 2019-01-01 RX ORDER — CIPROFLOXACIN 500 MG/1
500 TABLET ORAL EVERY 12 HOURS
Status: DISCONTINUED | OUTPATIENT
Start: 2019-01-01 | End: 2019-01-01

## 2019-01-01 RX ORDER — SODIUM CHLORIDE 9 MG/ML
INJECTION, SOLUTION INTRAVENOUS CONTINUOUS
Status: DISCONTINUED | OUTPATIENT
Start: 2019-01-01 | End: 2019-01-01

## 2019-01-01 RX ORDER — MEPERIDINE HYDROCHLORIDE 50 MG/ML
12.5 INJECTION INTRAMUSCULAR; INTRAVENOUS; SUBCUTANEOUS ONCE AS NEEDED
Status: DISCONTINUED | OUTPATIENT
Start: 2019-01-01 | End: 2019-01-01 | Stop reason: HOSPADM

## 2019-01-01 RX ORDER — GUAIFENESIN 600 MG/1
600 TABLET, EXTENDED RELEASE ORAL 2 TIMES DAILY
Status: DISCONTINUED | OUTPATIENT
Start: 2019-01-01 | End: 2019-01-01

## 2019-01-01 RX ORDER — FENTANYL CITRATE 50 UG/ML
INJECTION, SOLUTION INTRAMUSCULAR; INTRAVENOUS
Status: DISCONTINUED | OUTPATIENT
Start: 2019-01-01 | End: 2019-01-01

## 2019-01-01 RX ORDER — GLYCOPYRROLATE 0.2 MG/ML
INJECTION INTRAMUSCULAR; INTRAVENOUS
Status: DISCONTINUED | OUTPATIENT
Start: 2019-01-01 | End: 2019-01-01

## 2019-01-01 RX ORDER — MIDODRINE HYDROCHLORIDE 5 MG/1
20 TABLET ORAL 3 TIMES DAILY
Status: DISCONTINUED | OUTPATIENT
Start: 2019-01-01 | End: 2019-01-01

## 2019-01-01 RX ORDER — INSULIN ASPART 100 [IU]/ML
0-5 INJECTION, SOLUTION INTRAVENOUS; SUBCUTANEOUS
Status: DISCONTINUED | OUTPATIENT
Start: 2019-01-01 | End: 2019-01-01

## 2019-01-01 RX ORDER — CEFTRIAXONE 2 G/50ML
2 INJECTION, SOLUTION INTRAVENOUS ONCE
Status: DISCONTINUED | OUTPATIENT
Start: 2019-01-01 | End: 2019-01-01

## 2019-01-01 RX ORDER — PROMETHAZINE HYDROCHLORIDE 25 MG/1
25 TABLET ORAL EVERY 6 HOURS PRN
Status: CANCELLED | OUTPATIENT
Start: 2019-01-01

## 2019-01-01 RX ORDER — CALCITRIOL 0.25 UG/1
0.5 CAPSULE ORAL DAILY
Status: CANCELLED | OUTPATIENT
Start: 2019-01-01

## 2019-01-01 RX ORDER — MIDODRINE HYDROCHLORIDE 5 MG/1
10 TABLET ORAL
Status: DISCONTINUED | OUTPATIENT
Start: 2019-01-01 | End: 2019-01-01 | Stop reason: HOSPADM

## 2019-01-01 RX ORDER — FERROUS SULFATE 325(65) MG
325 TABLET, DELAYED RELEASE (ENTERIC COATED) ORAL 2 TIMES DAILY
Status: DISCONTINUED | OUTPATIENT
Start: 2019-01-01 | End: 2019-01-01 | Stop reason: HOSPADM

## 2019-01-01 RX ORDER — PANTOPRAZOLE SODIUM 40 MG/10ML
40 INJECTION, POWDER, LYOPHILIZED, FOR SOLUTION INTRAVENOUS DAILY
Status: DISCONTINUED | OUTPATIENT
Start: 2019-01-01 | End: 2019-01-01

## 2019-01-01 RX ORDER — CLOPIDOGREL BISULFATE 75 MG/1
75 TABLET ORAL DAILY
Status: DISCONTINUED | OUTPATIENT
Start: 2019-01-01 | End: 2019-01-01

## 2019-01-01 RX ORDER — POLYETHYLENE GLYCOL 3350 17 G/17G
17 POWDER, FOR SOLUTION ORAL 2 TIMES DAILY PRN
Status: CANCELLED | OUTPATIENT
Start: 2019-01-01

## 2019-01-01 RX ORDER — AMOXICILLIN 250 MG
1 CAPSULE ORAL 2 TIMES DAILY PRN
Status: CANCELLED | OUTPATIENT
Start: 2019-01-01

## 2019-01-01 RX ORDER — AMOXICILLIN 250 MG
1 CAPSULE ORAL DAILY
Status: DISCONTINUED | OUTPATIENT
Start: 2019-01-01 | End: 2019-01-01

## 2019-01-01 RX ORDER — PANTOPRAZOLE SODIUM 40 MG/10ML
40 INJECTION, POWDER, LYOPHILIZED, FOR SOLUTION INTRAVENOUS 2 TIMES DAILY
Status: DISCONTINUED | OUTPATIENT
Start: 2019-01-01 | End: 2019-01-01

## 2019-01-01 RX ORDER — ROCURONIUM BROMIDE 10 MG/ML
INJECTION, SOLUTION INTRAVENOUS
Status: COMPLETED
Start: 2019-01-01 | End: 2019-01-01

## 2019-01-01 RX ORDER — MIDODRINE HYDROCHLORIDE 5 MG/1
10 TABLET ORAL 3 TIMES DAILY
Status: DISCONTINUED | OUTPATIENT
Start: 2019-01-01 | End: 2019-01-01

## 2019-01-01 RX ORDER — HEPARIN SODIUM 1000 [USP'U]/ML
INJECTION, SOLUTION INTRAVENOUS; SUBCUTANEOUS
Status: DISCONTINUED | OUTPATIENT
Start: 2019-01-01 | End: 2019-01-01

## 2019-01-01 RX ORDER — SODIUM CHLORIDE 0.9 % (FLUSH) 0.9 %
10 SYRINGE (ML) INJECTION
Status: DISCONTINUED | OUTPATIENT
Start: 2019-01-01 | End: 2019-01-01 | Stop reason: HOSPADM

## 2019-01-01 RX ORDER — LIDOCAINE HYDROCHLORIDE 10 MG/ML
INJECTION, SOLUTION EPIDURAL; INFILTRATION; INTRACAUDAL; PERINEURAL
Status: COMPLETED
Start: 2019-01-01 | End: 2019-01-01

## 2019-01-01 RX ORDER — LANOLIN ALCOHOL/MO/W.PET/CERES
400 CREAM (GRAM) TOPICAL DAILY
Status: DISCONTINUED | OUTPATIENT
Start: 2019-01-01 | End: 2019-01-01 | Stop reason: HOSPADM

## 2019-01-01 RX ORDER — ATORVASTATIN CALCIUM 20 MG/1
40 TABLET, FILM COATED ORAL NIGHTLY
Status: CANCELLED | OUTPATIENT
Start: 2019-01-01

## 2019-01-01 RX ORDER — GABAPENTIN 300 MG/1
CAPSULE ORAL
Qty: 60 CAPSULE | Refills: 2 | OUTPATIENT
Start: 2019-01-01

## 2019-01-01 RX ORDER — CINACALCET 30 MG/1
90 TABLET, FILM COATED ORAL
Status: DISCONTINUED | OUTPATIENT
Start: 2019-01-01 | End: 2019-01-01 | Stop reason: HOSPADM

## 2019-01-01 RX ORDER — LANOLIN ALCOHOL/MO/W.PET/CERES
400 CREAM (GRAM) TOPICAL 3 TIMES DAILY
Refills: 0 | Status: ON HOLD | COMMUNITY
Start: 2019-01-01 | End: 2019-01-01 | Stop reason: HOSPADM

## 2019-01-01 RX ORDER — ALPRAZOLAM 0.25 MG/1
0.25 TABLET ORAL ONCE
Status: COMPLETED | OUTPATIENT
Start: 2019-01-01 | End: 2019-01-01

## 2019-01-01 RX ORDER — METOCLOPRAMIDE HYDROCHLORIDE 5 MG/ML
10 INJECTION INTRAMUSCULAR; INTRAVENOUS EVERY 8 HOURS
Status: DISCONTINUED | OUTPATIENT
Start: 2019-01-01 | End: 2019-01-01

## 2019-01-01 RX ORDER — POTASSIUM CHLORIDE 20 MEQ/1
20 TABLET, EXTENDED RELEASE ORAL DAILY
Status: DISCONTINUED | OUTPATIENT
Start: 2019-01-01 | End: 2019-01-01

## 2019-01-01 RX ORDER — GABAPENTIN 300 MG/1
CAPSULE ORAL
Qty: 90 CAPSULE | Refills: 3 | Status: ON HOLD | OUTPATIENT
Start: 2019-01-01 | End: 2019-01-01 | Stop reason: HOSPADM

## 2019-01-01 RX ORDER — DIPHENHYDRAMINE HCL 50 MG
50 CAPSULE ORAL ONCE
Status: COMPLETED | OUTPATIENT
Start: 2019-01-01 | End: 2019-01-01

## 2019-01-01 RX ORDER — CINACALCET 90 MG/1
90 TABLET, FILM COATED ORAL DAILY
COMMUNITY
End: 2019-01-01

## 2019-01-01 RX ORDER — KETAMINE HYDROCHLORIDE 100 MG/ML
INJECTION, SOLUTION INTRAMUSCULAR; INTRAVENOUS
Status: DISCONTINUED | OUTPATIENT
Start: 2019-01-01 | End: 2019-01-01

## 2019-01-01 RX ORDER — RAMELTEON 8 MG/1
8 TABLET ORAL NIGHTLY PRN
Status: DISCONTINUED | OUTPATIENT
Start: 2019-01-01 | End: 2019-01-01 | Stop reason: HOSPADM

## 2019-01-01 RX ORDER — HYDROMORPHONE HYDROCHLORIDE 1 MG/ML
0.2 INJECTION, SOLUTION INTRAMUSCULAR; INTRAVENOUS; SUBCUTANEOUS
Status: DISCONTINUED | OUTPATIENT
Start: 2019-01-01 | End: 2019-01-01

## 2019-01-01 RX ORDER — HEPARIN SODIUM 1000 [USP'U]/ML
1000 INJECTION, SOLUTION INTRAVENOUS; SUBCUTANEOUS
Status: CANCELLED | OUTPATIENT
Start: 2019-01-01

## 2019-01-01 RX ORDER — AMOXICILLIN 250 MG
1 CAPSULE ORAL 2 TIMES DAILY PRN
Status: DISCONTINUED | OUTPATIENT
Start: 2019-01-01 | End: 2019-01-01 | Stop reason: HOSPADM

## 2019-01-01 RX ORDER — MIDODRINE HYDROCHLORIDE 2.5 MG/1
10 TABLET ORAL
Status: DISCONTINUED | OUTPATIENT
Start: 2019-01-01 | End: 2019-01-01

## 2019-01-01 RX ORDER — ACETAMINOPHEN 325 MG/1
650 TABLET ORAL EVERY 4 HOURS PRN
Status: DISCONTINUED | OUTPATIENT
Start: 2019-01-01 | End: 2019-01-01 | Stop reason: HOSPADM

## 2019-01-01 RX ORDER — NOREPINEPHRINE BITARTRATE/D5W 4MG/250ML
0.02 PLASTIC BAG, INJECTION (ML) INTRAVENOUS CONTINUOUS
Status: DISCONTINUED | OUTPATIENT
Start: 2019-01-01 | End: 2019-01-01

## 2019-01-01 RX ORDER — SODIUM CHLORIDE 9 MG/ML
INJECTION, SOLUTION INTRAVENOUS
Status: CANCELLED | OUTPATIENT
Start: 2019-01-01

## 2019-01-01 RX ORDER — LORAZEPAM 2 MG/ML
INJECTION INTRAMUSCULAR CODE/TRAUMA/SEDATION MEDICATION
Status: COMPLETED | OUTPATIENT
Start: 2019-01-01 | End: 2019-01-01

## 2019-01-01 RX ORDER — ASPIRIN 81 MG/1
81 TABLET ORAL DAILY
Status: CANCELLED | OUTPATIENT
Start: 2019-01-01

## 2019-01-01 RX ORDER — SEVELAMER CARBONATE 800 MG/1
800 TABLET, FILM COATED ORAL
Qty: 90 TABLET | Refills: 11 | Status: ON HOLD | OUTPATIENT
Start: 2019-01-01 | End: 2019-01-01 | Stop reason: HOSPADM

## 2019-01-01 RX ORDER — MAGNESIUM SULFATE HEPTAHYDRATE 40 MG/ML
2 INJECTION, SOLUTION INTRAVENOUS ONCE
Status: COMPLETED | OUTPATIENT
Start: 2019-01-01 | End: 2019-01-01

## 2019-01-01 RX ORDER — ONDANSETRON 2 MG/ML
4 INJECTION INTRAMUSCULAR; INTRAVENOUS
Status: COMPLETED | OUTPATIENT
Start: 2019-01-01 | End: 2019-01-01

## 2019-01-01 RX ORDER — ASPIRIN 81 MG/1
81 TABLET ORAL DAILY
Status: DISCONTINUED | OUTPATIENT
Start: 2019-01-01 | End: 2019-01-01 | Stop reason: HOSPADM

## 2019-01-01 RX ORDER — MIDODRINE HYDROCHLORIDE 5 MG/1
10 TABLET ORAL ONCE
Status: COMPLETED | OUTPATIENT
Start: 2019-01-01 | End: 2019-01-01

## 2019-01-01 RX ORDER — NATEGLINIDE 60 MG/1
120 TABLET ORAL
Status: DISCONTINUED | OUTPATIENT
Start: 2019-01-01 | End: 2019-01-01

## 2019-01-01 RX ORDER — METOPROLOL SUCCINATE 25 MG/1
25 TABLET, EXTENDED RELEASE ORAL DAILY
Qty: 30 TABLET | Refills: 11 | Status: ON HOLD | OUTPATIENT
Start: 2019-01-01 | End: 2019-01-01 | Stop reason: HOSPADM

## 2019-01-01 RX ORDER — INSULIN ASPART 100 [IU]/ML
0-5 INJECTION, SOLUTION INTRAVENOUS; SUBCUTANEOUS
Status: CANCELLED | OUTPATIENT
Start: 2019-01-01

## 2019-01-01 RX ORDER — FAMOTIDINE 20 MG/1
20 TABLET, FILM COATED ORAL 2 TIMES DAILY
Qty: 60 TABLET | Refills: 0 | Status: SHIPPED | OUTPATIENT
Start: 2019-01-01 | End: 2019-01-01 | Stop reason: SDUPTHER

## 2019-01-01 RX ORDER — INDOMETHACIN 25 MG/1
100 CAPSULE ORAL ONCE
Status: COMPLETED | OUTPATIENT
Start: 2019-01-01 | End: 2019-01-01

## 2019-01-01 RX ORDER — NOREPINEPHRINE BITARTRATE 1 MG/ML
INJECTION, SOLUTION INTRAVENOUS
Status: COMPLETED
Start: 2019-01-01 | End: 2019-01-01

## 2019-01-01 RX ORDER — SEVELAMER CARBONATE 800 MG/1
800 TABLET, FILM COATED ORAL
Status: CANCELLED | OUTPATIENT
Start: 2019-01-01

## 2019-01-01 RX ORDER — MORPHINE SULFATE 4 MG/ML
2 INJECTION, SOLUTION INTRAMUSCULAR; INTRAVENOUS ONCE
Status: COMPLETED | OUTPATIENT
Start: 2019-01-01 | End: 2019-01-01

## 2019-01-01 RX ORDER — LANOLIN ALCOHOL/MO/W.PET/CERES
400 CREAM (GRAM) TOPICAL 2 TIMES DAILY
Status: DISCONTINUED | OUTPATIENT
Start: 2019-01-01 | End: 2019-01-01

## 2019-01-01 RX ORDER — ACETAMINOPHEN 325 MG/1
650 TABLET ORAL EVERY 8 HOURS PRN
Status: CANCELLED | OUTPATIENT
Start: 2019-01-01

## 2019-01-01 RX ORDER — DEXTROSE MONOHYDRATE 100 MG/ML
INJECTION, SOLUTION INTRAVENOUS CONTINUOUS
Status: DISCONTINUED | OUTPATIENT
Start: 2019-01-01 | End: 2019-01-01

## 2019-01-01 RX ORDER — FERROUS SULFATE 325(65) MG
325 TABLET, DELAYED RELEASE (ENTERIC COATED) ORAL DAILY
Refills: 0 | COMMUNITY
Start: 2019-01-01

## 2019-01-01 RX ORDER — MORPHINE SULFATE 2 MG/ML
6 INJECTION, SOLUTION INTRAMUSCULAR; INTRAVENOUS
Status: COMPLETED | OUTPATIENT
Start: 2019-01-01 | End: 2019-01-01

## 2019-01-01 RX ORDER — HEPARIN SODIUM 1000 [USP'U]/ML
500 INJECTION, SOLUTION INTRAVENOUS; SUBCUTANEOUS
Status: DISCONTINUED | OUTPATIENT
Start: 2019-01-01 | End: 2019-01-01

## 2019-01-01 RX ORDER — VANCOMYCIN HCL IN 5 % DEXTROSE 1.25 G/25
1250 PLASTIC BAG, INJECTION (ML) INTRAVENOUS ONCE
Status: COMPLETED | OUTPATIENT
Start: 2019-01-01 | End: 2019-01-01

## 2019-01-01 RX ORDER — HEPARIN SODIUM 1000 [USP'U]/ML
2000 INJECTION, SOLUTION INTRAVENOUS; SUBCUTANEOUS ONCE
Status: COMPLETED | OUTPATIENT
Start: 2019-01-01 | End: 2019-01-01

## 2019-01-01 RX ORDER — HEPARIN SODIUM,PORCINE/D5W 25000/250
18 INTRAVENOUS SOLUTION INTRAVENOUS CONTINUOUS
Status: DISCONTINUED | OUTPATIENT
Start: 2019-01-01 | End: 2019-01-01

## 2019-01-01 RX ORDER — POTASSIUM CHLORIDE 20 MEQ/15ML
40 SOLUTION ORAL ONCE
Status: COMPLETED | OUTPATIENT
Start: 2019-01-01 | End: 2019-01-01

## 2019-01-01 RX ORDER — LOPERAMIDE HYDROCHLORIDE 2 MG/1
2 CAPSULE ORAL 3 TIMES DAILY PRN
Status: DISCONTINUED | OUTPATIENT
Start: 2019-01-01 | End: 2019-01-01 | Stop reason: HOSPADM

## 2019-01-01 RX ORDER — SODIUM CHLORIDE 0.9 % (FLUSH) 0.9 %
5 SYRINGE (ML) INJECTION
Status: CANCELLED | OUTPATIENT
Start: 2019-01-01

## 2019-01-01 RX ORDER — ONDANSETRON 4 MG/1
4 TABLET, ORALLY DISINTEGRATING ORAL EVERY 8 HOURS PRN
Status: DISCONTINUED | OUTPATIENT
Start: 2019-01-01 | End: 2019-01-01 | Stop reason: HOSPADM

## 2019-01-01 RX ORDER — SODIUM BICARBONATE 650 MG/1
1300 TABLET ORAL 3 TIMES DAILY
Status: DISCONTINUED | OUTPATIENT
Start: 2019-01-01 | End: 2019-01-01

## 2019-01-01 RX ORDER — CEFEPIME HYDROCHLORIDE 1 G/1
INJECTION, POWDER, FOR SOLUTION INTRAMUSCULAR; INTRAVENOUS
Start: 2019-01-01 | End: 2019-01-01

## 2019-01-01 RX ORDER — POTASSIUM CHLORIDE 20 MEQ/1
20 TABLET, EXTENDED RELEASE ORAL ONCE
Status: DISCONTINUED | OUTPATIENT
Start: 2019-01-01 | End: 2019-01-01

## 2019-01-01 RX ORDER — GABAPENTIN 300 MG/1
300 CAPSULE ORAL DAILY
Status: DISCONTINUED | OUTPATIENT
Start: 2019-01-01 | End: 2019-01-01

## 2019-01-01 RX ORDER — CETIRIZINE HYDROCHLORIDE 5 MG/1
5 TABLET ORAL DAILY
Status: DISCONTINUED | OUTPATIENT
Start: 2019-01-01 | End: 2019-01-01 | Stop reason: HOSPADM

## 2019-01-01 RX ORDER — CEFAZOLIN SODIUM 1 G/3ML
2 INJECTION, POWDER, FOR SOLUTION INTRAMUSCULAR; INTRAVENOUS
Status: DISCONTINUED | OUTPATIENT
Start: 2019-01-01 | End: 2019-01-01

## 2019-01-01 RX ORDER — ACETAMINOPHEN 325 MG/1
650 TABLET ORAL EVERY 8 HOURS PRN
Status: DISCONTINUED | OUTPATIENT
Start: 2019-01-01 | End: 2019-01-01

## 2019-01-01 RX ORDER — ONDANSETRON 4 MG/1
8 TABLET, ORALLY DISINTEGRATING ORAL EVERY 8 HOURS PRN
Qty: 12 TABLET | Refills: 0 | Status: ON HOLD | OUTPATIENT
Start: 2019-01-01 | End: 2019-01-01 | Stop reason: HOSPADM

## 2019-01-01 RX ORDER — SODIUM CHLORIDE 0.9 % (FLUSH) 0.9 %
3 SYRINGE (ML) INJECTION
Status: DISCONTINUED | OUTPATIENT
Start: 2019-01-01 | End: 2019-01-01

## 2019-01-01 RX ORDER — IBUPROFEN 200 MG
16 TABLET ORAL
Status: DISCONTINUED | OUTPATIENT
Start: 2019-01-01 | End: 2019-01-01

## 2019-01-01 RX ORDER — DOBUTAMINE HYDROCHLORIDE 400 MG/100ML
5 INJECTION, SOLUTION INTRAVENOUS CONTINUOUS
Status: DISCONTINUED | OUTPATIENT
Start: 2019-01-01 | End: 2019-01-01

## 2019-01-01 RX ORDER — SODIUM BICARBONATE 650 MG/1
650 TABLET ORAL 3 TIMES DAILY
Status: DISCONTINUED | OUTPATIENT
Start: 2019-01-01 | End: 2019-01-01

## 2019-01-01 RX ORDER — HEPARIN SOD,PORCINE/0.9 % NACL 1000/500ML
INTRAVENOUS SOLUTION INTRAVENOUS
Status: DISCONTINUED | OUTPATIENT
Start: 2019-01-01 | End: 2019-01-01 | Stop reason: HOSPADM

## 2019-01-01 RX ORDER — MAGNESIUM SULFATE 1 G/100ML
1 INJECTION INTRAVENOUS ONCE
Status: COMPLETED | OUTPATIENT
Start: 2019-01-01 | End: 2019-01-01

## 2019-01-01 RX ORDER — ATORVASTATIN CALCIUM 40 MG/1
40 TABLET, FILM COATED ORAL NIGHTLY
Status: DISCONTINUED | OUTPATIENT
Start: 2019-01-01 | End: 2019-01-01 | Stop reason: HOSPADM

## 2019-01-01 RX ORDER — MAGNESIUM SULFATE HEPTAHYDRATE 40 MG/ML
2 INJECTION, SOLUTION INTRAVENOUS
Status: DISPENSED | OUTPATIENT
Start: 2019-01-01 | End: 2019-01-01

## 2019-01-01 RX ORDER — MEPERIDINE HYDROCHLORIDE 50 MG/ML
12.5 INJECTION INTRAMUSCULAR; INTRAVENOUS; SUBCUTANEOUS ONCE AS NEEDED
Status: CANCELLED | OUTPATIENT
Start: 2019-01-01 | End: 2019-01-01

## 2019-01-01 RX ORDER — CALCIUM CARBONATE 200(500)MG
500 TABLET,CHEWABLE ORAL 2 TIMES DAILY PRN
Status: DISCONTINUED | OUTPATIENT
Start: 2019-01-01 | End: 2019-01-01

## 2019-01-01 RX ORDER — POTASSIUM CHLORIDE 20 MEQ/1
40 TABLET, EXTENDED RELEASE ORAL ONCE
Status: COMPLETED | OUTPATIENT
Start: 2019-01-01 | End: 2019-01-01

## 2019-01-01 RX ORDER — ONDANSETRON 2 MG/ML
4 INJECTION INTRAMUSCULAR; INTRAVENOUS DAILY PRN
Status: CANCELLED | OUTPATIENT
Start: 2019-01-01

## 2019-01-01 RX ORDER — IPRATROPIUM BROMIDE AND ALBUTEROL SULFATE 2.5; .5 MG/3ML; MG/3ML
3 SOLUTION RESPIRATORY (INHALATION) EVERY 6 HOURS
Status: DISCONTINUED | OUTPATIENT
Start: 2019-01-01 | End: 2019-01-01 | Stop reason: HOSPADM

## 2019-01-01 RX ORDER — PROPOFOL 10 MG/ML
VIAL (ML) INTRAVENOUS CONTINUOUS PRN
Status: DISCONTINUED | OUTPATIENT
Start: 2019-01-01 | End: 2019-01-01

## 2019-01-01 RX ORDER — VANCOMYCIN 1.75 GRAM/500 ML IN 0.9 % SODIUM CHLORIDE INTRAVENOUS
20 ONCE
Status: COMPLETED | OUTPATIENT
Start: 2019-01-01 | End: 2019-01-01

## 2019-01-01 RX ORDER — SODIUM CHLORIDE 9 MG/ML
INJECTION, SOLUTION INTRAVENOUS
Status: DISCONTINUED | OUTPATIENT
Start: 2019-01-01 | End: 2019-01-01

## 2019-01-01 RX ORDER — ONDANSETRON 2 MG/ML
4 INJECTION INTRAMUSCULAR; INTRAVENOUS EVERY 8 HOURS PRN
Status: DISCONTINUED | OUTPATIENT
Start: 2019-01-01 | End: 2019-01-01

## 2019-01-01 RX ORDER — MORPHINE SULFATE 2 MG/ML
INJECTION, SOLUTION INTRAMUSCULAR; INTRAVENOUS
Status: COMPLETED
Start: 2019-01-01 | End: 2019-01-01

## 2019-01-01 RX ORDER — HEPARIN SODIUM 200 [USP'U]/100ML
INJECTION, SOLUTION INTRAVENOUS
Status: DISCONTINUED | OUTPATIENT
Start: 2019-01-01 | End: 2019-01-01 | Stop reason: HOSPADM

## 2019-01-01 RX ORDER — SUCRALFATE 1 G/1
1 TABLET ORAL 2 TIMES DAILY
Status: DISCONTINUED | OUTPATIENT
Start: 2019-01-01 | End: 2019-01-01

## 2019-01-01 RX ORDER — CALCIUM CARBONATE 500(1250)
500 TABLET,CHEWABLE ORAL
COMMUNITY
Start: 2018-01-01 | End: 2019-01-01

## 2019-01-01 RX ORDER — GABAPENTIN 100 MG/1
100 CAPSULE ORAL 2 TIMES DAILY
Status: DISCONTINUED | OUTPATIENT
Start: 2019-01-01 | End: 2019-01-01

## 2019-01-01 RX ORDER — BISACODYL 10 MG
10 SUPPOSITORY, RECTAL RECTAL DAILY PRN
Status: DISCONTINUED | OUTPATIENT
Start: 2019-01-01 | End: 2019-01-01

## 2019-01-01 RX ORDER — CARVEDILOL 6.25 MG/1
6.25 TABLET ORAL 2 TIMES DAILY
Status: DISCONTINUED | OUTPATIENT
Start: 2019-01-01 | End: 2019-01-01

## 2019-01-01 RX ORDER — HEPARIN SODIUM 1000 [USP'U]/ML
1000 INJECTION, SOLUTION INTRAVENOUS; SUBCUTANEOUS
Status: DISCONTINUED | OUTPATIENT
Start: 2019-01-01 | End: 2019-01-01

## 2019-01-01 RX ORDER — VANCOMYCIN HCL IN 5 % DEXTROSE 1G/250ML
1000 PLASTIC BAG, INJECTION (ML) INTRAVENOUS
Status: COMPLETED | OUTPATIENT
Start: 2019-01-01 | End: 2019-01-01

## 2019-01-01 RX ORDER — HYDROCODONE BITARTRATE AND ACETAMINOPHEN 500; 5 MG/1; MG/1
TABLET ORAL
Status: DISCONTINUED | OUTPATIENT
Start: 2019-01-01 | End: 2019-01-01 | Stop reason: SDUPTHER

## 2019-01-01 RX ORDER — LIDOCAINE HYDROCHLORIDE 10 MG/ML
1 INJECTION INFILTRATION; PERINEURAL ONCE
Status: COMPLETED | OUTPATIENT
Start: 2019-01-01 | End: 2019-01-01

## 2019-01-01 RX ORDER — ATROPINE SULFATE 10 MG/ML
2 SOLUTION/ DROPS OPHTHALMIC EVERY 4 HOURS PRN
Status: DISCONTINUED | OUTPATIENT
Start: 2019-01-01 | End: 2019-07-21 | Stop reason: HOSPADM

## 2019-01-01 RX ORDER — CINACALCET 30 MG/1
90 TABLET, FILM COATED ORAL DAILY
Status: DISCONTINUED | OUTPATIENT
Start: 2019-01-01 | End: 2019-01-01 | Stop reason: HOSPADM

## 2019-01-01 RX ORDER — HEPARIN SODIUM 200 [USP'U]/100ML
INJECTION, SOLUTION INTRAVENOUS
Status: DISCONTINUED | OUTPATIENT
Start: 2019-01-01 | End: 2019-01-01

## 2019-01-01 RX ORDER — AMOXICILLIN 250 MG
1 CAPSULE ORAL 2 TIMES DAILY
Status: DISCONTINUED | OUTPATIENT
Start: 2019-01-01 | End: 2019-01-01

## 2019-01-01 RX ORDER — ETOMIDATE 2 MG/ML
20 INJECTION INTRAVENOUS ONCE
Status: COMPLETED | OUTPATIENT
Start: 2019-01-01 | End: 2019-01-01

## 2019-01-01 RX ORDER — METOPROLOL SUCCINATE 25 MG/1
25 TABLET, EXTENDED RELEASE ORAL DAILY
Status: DISCONTINUED | OUTPATIENT
Start: 2019-01-01 | End: 2019-01-01 | Stop reason: HOSPADM

## 2019-01-01 RX ORDER — GABAPENTIN 300 MG/1
300 CAPSULE ORAL 2 TIMES DAILY
Status: DISCONTINUED | OUTPATIENT
Start: 2019-01-01 | End: 2019-01-01

## 2019-01-01 RX ORDER — ACETAMINOPHEN 10 MG/ML
INJECTION, SOLUTION INTRAVENOUS
Status: DISCONTINUED | OUTPATIENT
Start: 2019-01-01 | End: 2019-01-01

## 2019-01-01 RX ORDER — PHENYLEPHRINE HCL IN 0.9% NACL 1 MG/10 ML
SYRINGE (ML) INTRAVENOUS
Status: COMPLETED
Start: 2019-01-01 | End: 2019-01-01

## 2019-01-01 RX ORDER — RAMELTEON 8 MG/1
8 TABLET ORAL NIGHTLY
Status: DISCONTINUED | OUTPATIENT
Start: 2019-01-01 | End: 2019-01-01 | Stop reason: HOSPADM

## 2019-01-01 RX ORDER — HEPARIN SODIUM,PORCINE/D5W 25000/250
12 INTRAVENOUS SOLUTION INTRAVENOUS CONTINUOUS
Status: DISCONTINUED | OUTPATIENT
Start: 2019-01-01 | End: 2019-01-01

## 2019-01-01 RX ORDER — HEPARIN SODIUM 5000 [USP'U]/ML
5000 INJECTION, SOLUTION INTRAVENOUS; SUBCUTANEOUS EVERY 12 HOURS
Status: DISCONTINUED | OUTPATIENT
Start: 2019-01-01 | End: 2019-01-01 | Stop reason: HOSPADM

## 2019-01-01 RX ORDER — ONDANSETRON 2 MG/ML
8 INJECTION INTRAMUSCULAR; INTRAVENOUS EVERY 6 HOURS
Status: DISCONTINUED | OUTPATIENT
Start: 2019-01-01 | End: 2019-01-01

## 2019-01-01 RX ORDER — LOPERAMIDE HYDROCHLORIDE 2 MG/1
2 CAPSULE ORAL 4 TIMES DAILY PRN
Status: DISCONTINUED | OUTPATIENT
Start: 2019-01-01 | End: 2019-01-01

## 2019-01-01 RX ORDER — CETIRIZINE HYDROCHLORIDE 5 MG/1
10 TABLET ORAL DAILY
Status: DISCONTINUED | OUTPATIENT
Start: 2019-01-01 | End: 2019-01-01

## 2019-01-01 RX ORDER — SODIUM BICARBONATE 650 MG/1
1300 TABLET ORAL 3 TIMES DAILY
Status: DISCONTINUED | OUTPATIENT
Start: 2019-01-01 | End: 2019-01-01 | Stop reason: HOSPADM

## 2019-01-01 RX ORDER — MIDODRINE HYDROCHLORIDE 5 MG/1
10 TABLET ORAL
Status: DISCONTINUED | OUTPATIENT
Start: 2019-01-01 | End: 2019-01-01

## 2019-01-01 RX ORDER — AMOXICILLIN 250 MG
1 CAPSULE ORAL 2 TIMES DAILY
Status: DISCONTINUED | OUTPATIENT
Start: 2019-01-01 | End: 2019-01-01 | Stop reason: HOSPADM

## 2019-01-01 RX ORDER — FENTANYL CITRATE-0.9 % NACL/PF 10 MCG/ML
PLASTIC BAG, INJECTION (ML) INTRAVENOUS CONTINUOUS
Status: DISCONTINUED | OUTPATIENT
Start: 2019-01-01 | End: 2019-01-01

## 2019-01-01 RX ORDER — MIDODRINE HYDROCHLORIDE 5 MG/1
15 TABLET ORAL 3 TIMES DAILY
Status: DISCONTINUED | OUTPATIENT
Start: 2019-01-01 | End: 2019-01-01

## 2019-01-01 RX ORDER — IRBESARTAN 75 MG/1
75 TABLET ORAL DAILY
Status: DISCONTINUED | OUTPATIENT
Start: 2019-01-01 | End: 2019-01-01

## 2019-01-01 RX ORDER — MUPIROCIN 20 MG/G
OINTMENT TOPICAL 2 TIMES DAILY
Status: DISCONTINUED | OUTPATIENT
Start: 2019-01-01 | End: 2019-01-01

## 2019-01-01 RX ORDER — HYDROMORPHONE HYDROCHLORIDE 1 MG/ML
1 INJECTION, SOLUTION INTRAMUSCULAR; INTRAVENOUS; SUBCUTANEOUS EVERY 6 HOURS PRN
Status: DISCONTINUED | OUTPATIENT
Start: 2019-01-01 | End: 2019-01-01

## 2019-01-01 RX ORDER — LOPERAMIDE HYDROCHLORIDE 2 MG/1
4 CAPSULE ORAL ONCE
Status: COMPLETED | OUTPATIENT
Start: 2019-01-01 | End: 2019-01-01

## 2019-01-01 RX ORDER — SODIUM CHLORIDE 0.9 G/100ML
IRRIGANT IRRIGATION
Status: DISCONTINUED | OUTPATIENT
Start: 2019-01-01 | End: 2019-01-01 | Stop reason: HOSPADM

## 2019-01-01 RX ORDER — SODIUM BICARBONATE 650 MG/1
650 TABLET ORAL 2 TIMES DAILY
Status: DISCONTINUED | OUTPATIENT
Start: 2019-01-01 | End: 2019-01-01

## 2019-01-01 RX ORDER — HYDROCODONE BITARTRATE AND ACETAMINOPHEN 5; 325 MG/1; MG/1
1 TABLET ORAL EVERY 4 HOURS PRN
Status: DISCONTINUED | OUTPATIENT
Start: 2019-01-01 | End: 2019-01-01 | Stop reason: HOSPADM

## 2019-01-01 RX ORDER — GABAPENTIN 100 MG/1
200 CAPSULE ORAL 3 TIMES DAILY
Status: DISCONTINUED | OUTPATIENT
Start: 2019-01-01 | End: 2019-01-01

## 2019-01-01 RX ORDER — BENZONATATE 100 MG/1
100 CAPSULE ORAL 3 TIMES DAILY PRN
Status: DISCONTINUED | OUTPATIENT
Start: 2019-01-01 | End: 2019-01-01 | Stop reason: HOSPADM

## 2019-01-01 RX ORDER — HYDROMORPHONE HYDROCHLORIDE 2 MG/ML
1 INJECTION, SOLUTION INTRAMUSCULAR; INTRAVENOUS; SUBCUTANEOUS EVERY 6 HOURS PRN
Status: DISCONTINUED | OUTPATIENT
Start: 2019-01-01 | End: 2019-01-01 | Stop reason: HOSPADM

## 2019-01-01 RX ORDER — AMLODIPINE BESYLATE 10 MG/1
10 TABLET ORAL
Status: ON HOLD | COMMUNITY
Start: 2018-01-01 | End: 2019-01-01 | Stop reason: HOSPADM

## 2019-01-01 RX ORDER — SODIUM BICARBONATE 650 MG/1
1300 TABLET ORAL 2 TIMES DAILY
Status: DISCONTINUED | OUTPATIENT
Start: 2019-01-01 | End: 2019-01-01

## 2019-01-01 RX ORDER — ONDANSETRON 2 MG/ML
4 INJECTION INTRAMUSCULAR; INTRAVENOUS EVERY 8 HOURS PRN
Status: DISCONTINUED | OUTPATIENT
Start: 2019-01-01 | End: 2019-01-01 | Stop reason: HOSPADM

## 2019-01-01 RX ORDER — ETOMIDATE 2 MG/ML
INJECTION INTRAVENOUS
Status: COMPLETED
Start: 2019-01-01 | End: 2019-01-01

## 2019-01-01 RX ORDER — PANTOPRAZOLE SODIUM 40 MG/1
40 TABLET, DELAYED RELEASE ORAL DAILY
Qty: 30 TABLET | Refills: 11 | Status: SHIPPED | OUTPATIENT
Start: 2019-01-01 | End: 2019-01-01

## 2019-01-01 RX ORDER — LORAZEPAM 2 MG/ML
INJECTION INTRAMUSCULAR
Status: COMPLETED
Start: 2019-01-01 | End: 2019-01-01

## 2019-01-01 RX ORDER — SODIUM CHLORIDE 0.9 % (FLUSH) 0.9 %
3 SYRINGE (ML) INJECTION EVERY 8 HOURS
Status: DISCONTINUED | OUTPATIENT
Start: 2019-01-01 | End: 2019-01-01 | Stop reason: HOSPADM

## 2019-01-01 RX ORDER — ACETAMINOPHEN 500 MG
500 TABLET ORAL EVERY 8 HOURS PRN
Status: DISCONTINUED | OUTPATIENT
Start: 2019-01-01 | End: 2019-01-01 | Stop reason: HOSPADM

## 2019-01-01 RX ORDER — ROCURONIUM BROMIDE 10 MG/ML
INJECTION, SOLUTION INTRAVENOUS
Status: DISCONTINUED | OUTPATIENT
Start: 2019-01-01 | End: 2019-01-01

## 2019-01-01 RX ORDER — ONDANSETRON 2 MG/ML
4 INJECTION INTRAMUSCULAR; INTRAVENOUS EVERY 12 HOURS PRN
Status: DISCONTINUED | OUTPATIENT
Start: 2019-01-01 | End: 2019-01-01

## 2019-01-01 RX ORDER — MUPIROCIN 20 MG/G
OINTMENT TOPICAL
Status: DISCONTINUED | OUTPATIENT
Start: 2019-01-01 | End: 2019-01-01

## 2019-01-01 RX ORDER — ACETAMINOPHEN 325 MG/1
650 TABLET ORAL
Status: DISCONTINUED | OUTPATIENT
Start: 2019-01-01 | End: 2019-01-01

## 2019-01-01 RX ORDER — LISINOPRIL 2.5 MG/1
2.5 TABLET ORAL 2 TIMES DAILY
Status: DISCONTINUED | OUTPATIENT
Start: 2019-01-01 | End: 2019-01-01

## 2019-01-01 RX ORDER — MIDODRINE HYDROCHLORIDE 5 MG/1
20 TABLET ORAL
Status: DISCONTINUED | OUTPATIENT
Start: 2019-01-01 | End: 2019-01-01

## 2019-01-01 RX ORDER — FENTANYL CITRATE 50 UG/ML
50 INJECTION, SOLUTION INTRAMUSCULAR; INTRAVENOUS EVERY 5 MIN PRN
Status: DISCONTINUED | OUTPATIENT
Start: 2019-01-01 | End: 2019-01-01 | Stop reason: HOSPADM

## 2019-01-01 RX ORDER — CARVEDILOL 3.12 MG/1
3.12 TABLET ORAL
Status: ON HOLD | COMMUNITY
Start: 2018-01-01 | End: 2019-01-01 | Stop reason: HOSPADM

## 2019-01-01 RX ORDER — LOPERAMIDE HYDROCHLORIDE 2 MG/1
2 CAPSULE ORAL 4 TIMES DAILY PRN
Status: CANCELLED | OUTPATIENT
Start: 2019-01-01

## 2019-01-01 RX ORDER — GABAPENTIN 300 MG/1
300 CAPSULE ORAL NIGHTLY
Status: DISCONTINUED | OUTPATIENT
Start: 2019-01-01 | End: 2019-01-01

## 2019-01-01 RX ORDER — SUCCINYLCHOLINE CHLORIDE 20 MG/ML
INJECTION INTRAMUSCULAR; INTRAVENOUS
Status: COMPLETED
Start: 2019-01-01 | End: 2019-01-01

## 2019-01-01 RX ORDER — ACETAMINOPHEN 500 MG
1000 TABLET ORAL EVERY 8 HOURS PRN
Status: DISCONTINUED | OUTPATIENT
Start: 2019-01-01 | End: 2019-01-01

## 2019-01-01 RX ORDER — DOBUTAMINE HYDROCHLORIDE 400 MG/100ML
2.5 INJECTION, SOLUTION INTRAVENOUS CONTINUOUS
Status: DISCONTINUED | OUTPATIENT
Start: 2019-01-01 | End: 2019-01-01

## 2019-01-01 RX ORDER — FLUCONAZOLE 2 MG/ML
200 INJECTION, SOLUTION INTRAVENOUS
Status: DISCONTINUED | OUTPATIENT
Start: 2019-01-01 | End: 2019-01-01

## 2019-01-01 RX ORDER — AMOXICILLIN 250 MG
1 CAPSULE ORAL 2 TIMES DAILY
Status: CANCELLED | OUTPATIENT
Start: 2019-01-01

## 2019-01-01 RX ORDER — CIPROFLOXACIN 500 MG/1
500 TABLET ORAL EVERY 24 HOURS
Status: DISCONTINUED | OUTPATIENT
Start: 2019-01-01 | End: 2019-01-01 | Stop reason: HOSPADM

## 2019-01-01 RX ORDER — HEPARIN SODIUM 5000 [USP'U]/ML
5000 INJECTION, SOLUTION INTRAVENOUS; SUBCUTANEOUS EVERY 8 HOURS
Status: DISPENSED | OUTPATIENT
Start: 2019-01-01 | End: 2019-01-01

## 2019-01-01 RX ORDER — HEPARIN SODIUM 1000 [USP'U]/ML
2000 INJECTION, SOLUTION INTRAVENOUS; SUBCUTANEOUS
Status: CANCELLED | OUTPATIENT
Start: 2019-01-01

## 2019-01-01 RX ORDER — CETIRIZINE HYDROCHLORIDE 5 MG/1
5 TABLET ORAL DAILY
Refills: 0 | COMMUNITY
Start: 2019-01-01 | End: 2019-01-01

## 2019-01-01 RX ORDER — CHLORHEXIDINE GLUCONATE ORAL RINSE 1.2 MG/ML
15 SOLUTION DENTAL 2 TIMES DAILY
Status: DISCONTINUED | OUTPATIENT
Start: 2019-01-01 | End: 2019-01-01

## 2019-01-01 RX ORDER — MIDODRINE HYDROCHLORIDE 10 MG/1
10 TABLET ORAL
Qty: 90 TABLET | Refills: 2 | Status: SHIPPED | OUTPATIENT
Start: 2019-01-01 | End: 2019-01-01 | Stop reason: SDUPTHER

## 2019-01-01 RX ORDER — LOPERAMIDE HYDROCHLORIDE 2 MG/1
2 CAPSULE ORAL 4 TIMES DAILY PRN
Status: DISCONTINUED | OUTPATIENT
Start: 2019-01-01 | End: 2019-01-01 | Stop reason: HOSPADM

## 2019-01-01 RX ORDER — NITROGLYCERIN 5 MG/ML
INJECTION, SOLUTION INTRAVENOUS
Status: DISCONTINUED | OUTPATIENT
Start: 2019-01-01 | End: 2019-01-01 | Stop reason: HOSPADM

## 2019-01-01 RX ORDER — GABAPENTIN 300 MG/1
600 CAPSULE ORAL 3 TIMES DAILY
Status: DISCONTINUED | OUTPATIENT
Start: 2019-01-01 | End: 2019-01-01 | Stop reason: HOSPADM

## 2019-01-01 RX ORDER — HEPARIN SODIUM 5000 [USP'U]/ML
5000 INJECTION, SOLUTION INTRAVENOUS; SUBCUTANEOUS
Status: DISCONTINUED | OUTPATIENT
Start: 2019-01-01 | End: 2019-01-01 | Stop reason: HOSPADM

## 2019-01-01 RX ORDER — PROMETHAZINE HYDROCHLORIDE 25 MG/1
25 SUPPOSITORY RECTAL EVERY 6 HOURS PRN
Status: DISCONTINUED | OUTPATIENT
Start: 2019-01-01 | End: 2019-01-01 | Stop reason: HOSPADM

## 2019-01-01 RX ORDER — EPINEPHRINE 1 MG/ML
INJECTION INTRAMUSCULAR; INTRAVENOUS; SUBCUTANEOUS
Status: COMPLETED
Start: 2019-01-01 | End: 2019-01-01

## 2019-01-01 RX ORDER — CIPROFLOXACIN 500 MG/1
500 TABLET ORAL DAILY
Qty: 10 TABLET | Refills: 0 | Status: SHIPPED | OUTPATIENT
Start: 2019-01-01 | End: 2019-01-01

## 2019-01-01 RX ORDER — PANTOPRAZOLE SODIUM 40 MG/10ML
40 INJECTION, POWDER, LYOPHILIZED, FOR SOLUTION INTRAVENOUS 2 TIMES DAILY
Status: DISCONTINUED | OUTPATIENT
Start: 2019-01-01 | End: 2019-01-01 | Stop reason: HOSPADM

## 2019-01-01 RX ORDER — GLUCAGON 1 MG
1 KIT INJECTION
Status: DISCONTINUED | OUTPATIENT
Start: 2019-01-01 | End: 2019-01-01

## 2019-01-01 RX ORDER — FERROUS SULFATE 325(65) MG
325 TABLET, DELAYED RELEASE (ENTERIC COATED) ORAL DAILY
Status: DISCONTINUED | OUTPATIENT
Start: 2019-01-01 | End: 2019-01-01 | Stop reason: HOSPADM

## 2019-01-01 RX ORDER — HEPARIN SODIUM 5000 [USP'U]/ML
5000 INJECTION, SOLUTION INTRAVENOUS; SUBCUTANEOUS EVERY 8 HOURS
Status: DISCONTINUED | OUTPATIENT
Start: 2019-01-01 | End: 2019-01-01 | Stop reason: HOSPADM

## 2019-01-01 RX ORDER — HEPARIN SODIUM 1000 [USP'U]/ML
1000 INJECTION INTRAVENOUS; SUBCUTANEOUS
Status: DISCONTINUED | OUTPATIENT
Start: 2019-01-01 | End: 2019-01-01 | Stop reason: HOSPADM

## 2019-01-01 RX ORDER — GLYCERIN 1 G/1
1 SUPPOSITORY RECTAL ONCE
Status: COMPLETED | OUTPATIENT
Start: 2019-01-01 | End: 2019-01-01

## 2019-01-01 RX ORDER — FLUCONAZOLE 100 MG/1
200 TABLET ORAL DAILY
Status: DISCONTINUED | OUTPATIENT
Start: 2019-01-01 | End: 2019-01-01

## 2019-01-01 RX ORDER — ONDANSETRON 8 MG/1
8 TABLET, ORALLY DISINTEGRATING ORAL EVERY 8 HOURS PRN
Status: CANCELLED | OUTPATIENT
Start: 2019-01-01

## 2019-01-01 RX ORDER — ASPIRIN 325 MG
325 TABLET ORAL ONCE
Status: COMPLETED | OUTPATIENT
Start: 2019-01-01 | End: 2019-01-01

## 2019-01-01 RX ORDER — OXYCODONE HYDROCHLORIDE 5 MG/1
5 TABLET ORAL EVERY 4 HOURS PRN
Status: DISCONTINUED | OUTPATIENT
Start: 2019-01-01 | End: 2019-01-01

## 2019-01-01 RX ORDER — NATEGLINIDE 120 MG/1
120 TABLET ORAL
Status: DISCONTINUED | OUTPATIENT
Start: 2019-01-01 | End: 2019-01-01

## 2019-01-01 RX ORDER — CINACALCET 30 MG/1
90 TABLET, FILM COATED ORAL NIGHTLY
Status: DISCONTINUED | OUTPATIENT
Start: 2019-01-01 | End: 2019-01-01 | Stop reason: HOSPADM

## 2019-01-01 RX ORDER — IRBESARTAN 75 MG/1
75 TABLET ORAL DAILY
Status: CANCELLED | OUTPATIENT
Start: 2019-01-01

## 2019-01-01 RX ORDER — AMOXICILLIN 250 MG
1 CAPSULE ORAL 2 TIMES DAILY PRN
COMMUNITY
Start: 2019-01-01 | End: 2019-01-01

## 2019-01-01 RX ORDER — CLOPIDOGREL BISULFATE 75 MG/1
75 TABLET ORAL DAILY
Qty: 30 TABLET | Refills: 11 | Status: ON HOLD | OUTPATIENT
Start: 2019-01-01 | End: 2019-01-01 | Stop reason: HOSPADM

## 2019-01-01 RX ORDER — SUCCINYLCHOLINE CHLORIDE 20 MG/ML
100 INJECTION INTRAMUSCULAR; INTRAVENOUS ONCE
Status: COMPLETED | OUTPATIENT
Start: 2019-01-01 | End: 2019-01-01

## 2019-01-01 RX ORDER — CINACALCET 30 MG/1
90 TABLET, FILM COATED ORAL DAILY
Status: DISCONTINUED | OUTPATIENT
Start: 2019-01-01 | End: 2019-01-01

## 2019-01-01 RX ORDER — OXYCODONE AND ACETAMINOPHEN 10; 325 MG/1; MG/1
1 TABLET ORAL ONCE
Status: COMPLETED | OUTPATIENT
Start: 2019-01-01 | End: 2019-01-01

## 2019-01-01 RX ORDER — NATEGLINIDE 120 MG/1
120 TABLET ORAL
Status: CANCELLED | OUTPATIENT
Start: 2019-01-01

## 2019-01-01 RX ORDER — LIDOCAINE HYDROCHLORIDE 10 MG/ML
INJECTION, SOLUTION EPIDURAL; INFILTRATION; INTRACAUDAL; PERINEURAL
Status: DISCONTINUED | OUTPATIENT
Start: 2019-01-01 | End: 2019-01-01 | Stop reason: HOSPADM

## 2019-01-01 RX ORDER — PEN NEEDLE, DIABETIC 30 GX3/16"
1 NEEDLE, DISPOSABLE MISCELLANEOUS
Qty: 100 EACH | Refills: 5 | Status: SHIPPED | OUTPATIENT
Start: 2019-01-01 | End: 2019-01-01 | Stop reason: SDUPTHER

## 2019-01-01 RX ORDER — DIPHENHYDRAMINE HYDROCHLORIDE 50 MG/ML
INJECTION INTRAMUSCULAR; INTRAVENOUS
Status: DISCONTINUED | OUTPATIENT
Start: 2019-01-01 | End: 2019-01-01 | Stop reason: HOSPADM

## 2019-01-01 RX ORDER — SODIUM CHLORIDE 9 MG/ML
500 INJECTION, SOLUTION INTRAVENOUS
Status: COMPLETED | OUTPATIENT
Start: 2019-01-01 | End: 2019-01-01

## 2019-01-01 RX ORDER — ACETAMINOPHEN 325 MG/1
650 TABLET ORAL EVERY 6 HOURS PRN
Status: DISCONTINUED | OUTPATIENT
Start: 2019-01-01 | End: 2019-01-01

## 2019-01-01 RX ORDER — POLYETHYLENE GLYCOL 3350 17 G/17G
17 POWDER, FOR SOLUTION ORAL DAILY
Status: DISCONTINUED | OUTPATIENT
Start: 2019-01-01 | End: 2019-01-01

## 2019-01-01 RX ORDER — FERROUS SULFATE 325(65) MG
325 TABLET, DELAYED RELEASE (ENTERIC COATED) ORAL DAILY
Refills: 0 | Status: CANCELLED | COMMUNITY
Start: 2019-01-01

## 2019-01-01 RX ORDER — AMLODIPINE BESYLATE 10 MG/1
10 TABLET ORAL DAILY
Status: ON HOLD | COMMUNITY
End: 2019-01-01 | Stop reason: HOSPADM

## 2019-01-01 RX ORDER — HYDROCODONE BITARTRATE AND ACETAMINOPHEN 500; 5 MG/1; MG/1
TABLET ORAL CONTINUOUS
Status: ACTIVE | OUTPATIENT
Start: 2019-01-01 | End: 2019-01-01

## 2019-01-01 RX ORDER — MORPHINE SULFATE 2 MG/ML
2 INJECTION, SOLUTION INTRAMUSCULAR; INTRAVENOUS ONCE
Status: COMPLETED | OUTPATIENT
Start: 2019-01-01 | End: 2019-01-01

## 2019-01-01 RX ORDER — ASPIRIN 300 MG/1
300 SUPPOSITORY RECTAL EVERY 6 HOURS PRN
Status: DISCONTINUED | OUTPATIENT
Start: 2019-01-01 | End: 2019-01-01

## 2019-01-01 RX ORDER — ONDANSETRON 2 MG/ML
4 INJECTION INTRAMUSCULAR; INTRAVENOUS EVERY 8 HOURS PRN
Status: CANCELLED | OUTPATIENT
Start: 2019-01-01

## 2019-01-01 RX ORDER — INSULIN ASPART 100 [IU]/ML
0-5 INJECTION, SOLUTION INTRAVENOUS; SUBCUTANEOUS
Qty: 6 ML | Refills: 2 | Status: SHIPPED | OUTPATIENT
Start: 2019-01-01 | End: 2019-01-01 | Stop reason: SDUPTHER

## 2019-01-01 RX ORDER — LEVOFLOXACIN 250 MG/1
250 TABLET ORAL DAILY
Status: DISCONTINUED | OUTPATIENT
Start: 2019-01-01 | End: 2019-01-01

## 2019-01-01 RX ORDER — FAMOTIDINE 20 MG/1
20 TABLET, FILM COATED ORAL DAILY
Status: DISCONTINUED | OUTPATIENT
Start: 2019-01-01 | End: 2019-01-01 | Stop reason: HOSPADM

## 2019-01-01 RX ORDER — CALCITRIOL 0.25 UG/1
0.5 CAPSULE ORAL EVERY MORNING
Status: DISCONTINUED | OUTPATIENT
Start: 2019-01-01 | End: 2019-01-01 | Stop reason: HOSPADM

## 2019-01-01 RX ORDER — HYDRALAZINE HYDROCHLORIDE 20 MG/ML
10 INJECTION INTRAMUSCULAR; INTRAVENOUS EVERY 6 HOURS PRN
Status: DISCONTINUED | OUTPATIENT
Start: 2019-01-01 | End: 2019-01-01

## 2019-01-01 RX ORDER — PROPOFOL 10 MG/ML
INJECTION, EMULSION INTRAVENOUS
Status: COMPLETED
Start: 2019-01-01 | End: 2019-01-01

## 2019-01-01 RX ORDER — CARVEDILOL 12.5 MG/1
12.5 TABLET ORAL 2 TIMES DAILY
Status: DISCONTINUED | OUTPATIENT
Start: 2019-01-01 | End: 2019-01-01

## 2019-01-01 RX ORDER — ACETAMINOPHEN 325 MG/1
650 TABLET ORAL EVERY 6 HOURS PRN
COMMUNITY
End: 2019-01-01

## 2019-01-01 RX ORDER — CEFEPIME HYDROCHLORIDE 1 G/50ML
1 INJECTION, SOLUTION INTRAVENOUS EVERY 24 HOURS
Status: DISCONTINUED | OUTPATIENT
Start: 2019-01-01 | End: 2019-01-01 | Stop reason: HOSPADM

## 2019-01-01 RX ORDER — GABAPENTIN 100 MG/1
100 CAPSULE ORAL ONCE
Status: COMPLETED | OUTPATIENT
Start: 2019-01-01 | End: 2019-01-01

## 2019-01-01 RX ORDER — RAMELTEON 8 MG/1
8 TABLET ORAL NIGHTLY PRN
Status: DISCONTINUED | OUTPATIENT
Start: 2019-01-01 | End: 2019-01-01

## 2019-01-01 RX ORDER — KETAMINE HYDROCHLORIDE 50 MG/ML
INJECTION, SOLUTION INTRAMUSCULAR; INTRAVENOUS
Status: DISCONTINUED | OUTPATIENT
Start: 2019-01-01 | End: 2019-01-01

## 2019-01-01 RX ORDER — FLUCONAZOLE 2 MG/ML
200 INJECTION, SOLUTION INTRAVENOUS
Status: DISCONTINUED | OUTPATIENT
Start: 2019-01-01 | End: 2019-01-01 | Stop reason: ALTCHOICE

## 2019-01-01 RX ORDER — METOPROLOL SUCCINATE 25 MG/1
25 TABLET, EXTENDED RELEASE ORAL DAILY
Status: CANCELLED | OUTPATIENT
Start: 2019-01-01

## 2019-01-01 RX ORDER — AMLODIPINE BESYLATE 5 MG/1
5 TABLET ORAL DAILY
Status: DISCONTINUED | OUTPATIENT
Start: 2019-01-01 | End: 2019-01-01

## 2019-01-01 RX ORDER — CALCIUM CARBONATE 500(1250)
500 TABLET ORAL DAILY
Status: DISCONTINUED | OUTPATIENT
Start: 2019-01-01 | End: 2019-01-01 | Stop reason: HOSPADM

## 2019-01-01 RX ORDER — CIPROFLOXACIN 500 MG/1
500 TABLET ORAL DAILY
Qty: 6 TABLET | Refills: 0 | Status: SHIPPED | OUTPATIENT
Start: 2019-01-01 | End: 2019-01-01

## 2019-01-01 RX ORDER — GABAPENTIN 300 MG/1
300 CAPSULE ORAL 2 TIMES DAILY
Qty: 60 CAPSULE | Refills: 2 | Status: ON HOLD | OUTPATIENT
Start: 2019-01-01 | End: 2019-01-01 | Stop reason: HOSPADM

## 2019-01-01 RX ORDER — DIPHENHYDRAMINE HYDROCHLORIDE 50 MG/ML
50 INJECTION INTRAMUSCULAR; INTRAVENOUS ONCE
Status: COMPLETED | OUTPATIENT
Start: 2019-01-01 | End: 2019-01-01

## 2019-01-01 RX ORDER — LISINOPRIL 2.5 MG/1
2.5 TABLET ORAL DAILY
Status: DISCONTINUED | OUTPATIENT
Start: 2019-01-01 | End: 2019-01-01

## 2019-01-01 RX ORDER — HEPARIN SODIUM 1000 [USP'U]/ML
2500 INJECTION, SOLUTION INTRAVENOUS; SUBCUTANEOUS
Status: DISCONTINUED | OUTPATIENT
Start: 2019-01-01 | End: 2019-01-01 | Stop reason: HOSPADM

## 2019-01-01 RX ORDER — LISINOPRIL 20 MG/1
20 TABLET ORAL
Status: ON HOLD | COMMUNITY
Start: 2018-01-01 | End: 2019-01-01 | Stop reason: HOSPADM

## 2019-01-01 RX ORDER — MORPHINE SULFATE 2 MG/ML
2 INJECTION, SOLUTION INTRAMUSCULAR; INTRAVENOUS
Status: DISCONTINUED | OUTPATIENT
Start: 2019-01-01 | End: 2019-07-21 | Stop reason: HOSPADM

## 2019-01-01 RX ORDER — LIDOCAINE HYDROCHLORIDE 10 MG/ML
1 INJECTION, SOLUTION EPIDURAL; INFILTRATION; INTRACAUDAL; PERINEURAL ONCE
Status: DISCONTINUED | OUTPATIENT
Start: 2019-01-01 | End: 2019-01-01 | Stop reason: HOSPADM

## 2019-01-01 RX ORDER — FAMOTIDINE 20 MG/1
20 TABLET, FILM COATED ORAL NIGHTLY PRN
Status: DISCONTINUED | OUTPATIENT
Start: 2019-01-01 | End: 2019-01-01

## 2019-01-01 RX ORDER — HEPARIN SODIUM,PORCINE/D5W 25000/250
20 INTRAVENOUS SOLUTION INTRAVENOUS CONTINUOUS
Status: DISCONTINUED | OUTPATIENT
Start: 2019-01-01 | End: 2019-01-01

## 2019-01-01 RX ORDER — MORPHINE SULFATE/0.9% NACL/PF 1 MG/ML
1 PLASTIC BAG, INJECTION (ML) INTRAVENOUS CONTINUOUS
Status: DISCONTINUED | OUTPATIENT
Start: 2019-01-01 | End: 2019-01-01

## 2019-01-01 RX ORDER — ASPIRIN 600 MG/1
300 SUPPOSITORY RECTAL ONCE
Status: DISCONTINUED | OUTPATIENT
Start: 2019-01-01 | End: 2019-01-01

## 2019-01-01 RX ORDER — AMOXICILLIN 250 MG
1 CAPSULE ORAL DAILY PRN
Status: DISCONTINUED | OUTPATIENT
Start: 2019-01-01 | End: 2019-01-01

## 2019-01-01 RX ORDER — DOCUSATE SODIUM 100 MG/1
100 CAPSULE, LIQUID FILLED ORAL 3 TIMES DAILY
Status: DISCONTINUED | OUTPATIENT
Start: 2019-01-01 | End: 2019-01-01

## 2019-01-01 RX ADMIN — CALCITRIOL CAPSULES 0.25 MCG 0.5 MCG: 0.25 CAPSULE ORAL at 08:04

## 2019-01-01 RX ADMIN — Medication 0.06 MCG/KG/MIN: at 09:07

## 2019-01-01 RX ADMIN — LOPERAMIDE HYDROCHLORIDE 2 MG: 2 CAPSULE ORAL at 08:05

## 2019-01-01 RX ADMIN — MIDAZOLAM HYDROCHLORIDE 2 MG: 1 INJECTION, SOLUTION INTRAMUSCULAR; INTRAVENOUS at 01:07

## 2019-01-01 RX ADMIN — ONDANSETRON 8 MG: 2 INJECTION INTRAMUSCULAR; INTRAVENOUS at 02:03

## 2019-01-01 RX ADMIN — PIPERACILLIN AND TAZOBACTAM 4.5 G: 4; .5 INJECTION, POWDER, LYOPHILIZED, FOR SOLUTION INTRAVENOUS; PARENTERAL at 02:07

## 2019-01-01 RX ADMIN — MIDODRINE HYDROCHLORIDE 10 MG: 5 TABLET ORAL at 02:05

## 2019-01-01 RX ADMIN — Medication 1000 MG: at 11:04

## 2019-01-01 RX ADMIN — ALTEPLASE 4 MG: 2.2 INJECTION, POWDER, LYOPHILIZED, FOR SOLUTION INTRAVENOUS at 08:03

## 2019-01-01 RX ADMIN — GABAPENTIN 600 MG: 300 CAPSULE ORAL at 09:04

## 2019-01-01 RX ADMIN — ERYTHROPOIETIN 20000 UNITS: 20000 INJECTION, SOLUTION INTRAVENOUS; SUBCUTANEOUS at 09:02

## 2019-01-01 RX ADMIN — SEVELAMER CARBONATE 800 MG: 800 TABLET, FILM COATED ORAL at 09:03

## 2019-01-01 RX ADMIN — MIDODRINE HYDROCHLORIDE 15 MG: 5 TABLET ORAL at 03:07

## 2019-01-01 RX ADMIN — PIPERACILLIN AND TAZOBACTAM 4.5 G: 4; .5 INJECTION, POWDER, LYOPHILIZED, FOR SOLUTION INTRAVENOUS; PARENTERAL at 10:03

## 2019-01-01 RX ADMIN — HYDROMORPHONE HYDROCHLORIDE 1 MG: 2 INJECTION INTRAMUSCULAR; INTRAVENOUS; SUBCUTANEOUS at 05:03

## 2019-01-01 RX ADMIN — CINACALCET HYDROCHLORIDE 90 MG: 30 TABLET, COATED ORAL at 08:04

## 2019-01-01 RX ADMIN — POTASSIUM CHLORIDE 20 MEQ: 20 TABLET, EXTENDED RELEASE ORAL at 09:03

## 2019-01-01 RX ADMIN — LACTOBACILLUS TAB 4 TABLET: TAB at 09:03

## 2019-01-01 RX ADMIN — SODIUM CHLORIDE 250 ML: 0.9 INJECTION, SOLUTION INTRAVENOUS at 09:07

## 2019-01-01 RX ADMIN — HEPARIN SODIUM AND DEXTROSE 15 UNITS/KG/HR: 10000; 5 INJECTION INTRAVENOUS at 12:04

## 2019-01-01 RX ADMIN — CINACALCET HYDROCHLORIDE 90 MG: 30 TABLET, COATED ORAL at 08:03

## 2019-01-01 RX ADMIN — ATORVASTATIN CALCIUM 40 MG: 20 TABLET, FILM COATED ORAL at 08:04

## 2019-01-01 RX ADMIN — SODIUM BICARBONATE 650 MG TABLET 650 MG: at 10:03

## 2019-01-01 RX ADMIN — CALCITRIOL CAPSULES 0.25 MCG 0.5 MCG: 0.25 CAPSULE ORAL at 08:05

## 2019-01-01 RX ADMIN — HYDROMORPHONE HYDROCHLORIDE 1 MG: 2 INJECTION INTRAMUSCULAR; INTRAVENOUS; SUBCUTANEOUS at 09:03

## 2019-01-01 RX ADMIN — ACETAMINOPHEN 650 MG: 325 TABLET ORAL at 11:07

## 2019-01-01 RX ADMIN — LOPERAMIDE HYDROCHLORIDE 2 MG: 2 CAPSULE ORAL at 06:04

## 2019-01-01 RX ADMIN — LOPERAMIDE HYDROCHLORIDE 2 MG: 2 CAPSULE ORAL at 08:04

## 2019-01-01 RX ADMIN — SODIUM BICARBONATE 650 MG TABLET 650 MG: at 09:03

## 2019-01-01 RX ADMIN — PROPOFOL 100 MCG/KG/MIN: 10 INJECTION, EMULSION INTRAVENOUS at 12:03

## 2019-01-01 RX ADMIN — MAGNESIUM SULFATE IN WATER 2 G: 40 INJECTION, SOLUTION INTRAVENOUS at 04:04

## 2019-01-01 RX ADMIN — CEFEPIME 1 G: 1 INJECTION, POWDER, FOR SOLUTION INTRAMUSCULAR; INTRAVENOUS at 02:03

## 2019-01-01 RX ADMIN — HEPARIN SODIUM 1000 UNITS: 1000 INJECTION, SOLUTION INTRAVENOUS; SUBCUTANEOUS at 12:04

## 2019-01-01 RX ADMIN — CLOPIDOGREL BISULFATE 75 MG: 75 TABLET ORAL at 10:04

## 2019-01-01 RX ADMIN — CEFAZOLIN 2 G: 1 INJECTION, POWDER, FOR SOLUTION INTRAMUSCULAR; INTRAVENOUS at 05:07

## 2019-01-01 RX ADMIN — PANTOPRAZOLE SODIUM 40 MG: 40 TABLET, DELAYED RELEASE ORAL at 09:03

## 2019-01-01 RX ADMIN — GABAPENTIN 100 MG: 100 CAPSULE ORAL at 09:04

## 2019-01-01 RX ADMIN — HEPARIN SODIUM 1000 UNITS: 1000 INJECTION, SOLUTION INTRAVENOUS; SUBCUTANEOUS at 12:03

## 2019-01-01 RX ADMIN — INSULIN ASPART 3 UNITS: 100 INJECTION, SOLUTION INTRAVENOUS; SUBCUTANEOUS at 08:02

## 2019-01-01 RX ADMIN — HYDROMORPHONE HYDROCHLORIDE 1 MG: 2 INJECTION, SOLUTION INTRAMUSCULAR; INTRAVENOUS; SUBCUTANEOUS at 11:02

## 2019-01-01 RX ADMIN — Medication 400 MG: at 08:05

## 2019-01-01 RX ADMIN — IOHEXOL 15 ML: 350 INJECTION, SOLUTION INTRAVENOUS at 09:07

## 2019-01-01 RX ADMIN — MINERAL OIL, PETROLATUM, PHENYLEPHRINE HCL 1 APPLICATOR: 2.5; 140; 749 OINTMENT TOPICAL at 05:05

## 2019-01-01 RX ADMIN — ASPIRIN 81 MG: 81 TABLET, COATED ORAL at 08:05

## 2019-01-01 RX ADMIN — POTASSIUM CHLORIDE 20 MEQ: 20 TABLET, EXTENDED RELEASE ORAL at 08:03

## 2019-01-01 RX ADMIN — CLOPIDOGREL BISULFATE 75 MG: 75 TABLET, FILM COATED ORAL at 10:03

## 2019-01-01 RX ADMIN — LOPERAMIDE HYDROCHLORIDE 2 MG: 2 CAPSULE ORAL at 04:04

## 2019-01-01 RX ADMIN — CALCITRIOL CAPSULES 0.25 MCG 0.5 MCG: 0.25 CAPSULE ORAL at 09:04

## 2019-01-01 RX ADMIN — SEVELAMER CARBONATE 800 MG: 800 TABLET, FILM COATED ORAL at 04:05

## 2019-01-01 RX ADMIN — METOPROLOL SUCCINATE 25 MG: 25 TABLET, EXTENDED RELEASE ORAL at 08:04

## 2019-01-01 RX ADMIN — ATORVASTATIN CALCIUM 40 MG: 20 TABLET, FILM COATED ORAL at 09:05

## 2019-01-01 RX ADMIN — SEVELAMER CARBONATE 800 MG: 800 TABLET, FILM COATED ORAL at 08:03

## 2019-01-01 RX ADMIN — SEVELAMER CARBONATE 800 MG: 800 TABLET, FILM COATED ORAL at 03:05

## 2019-01-01 RX ADMIN — ETOMIDATE 4 MG: 2 INJECTION, SOLUTION INTRAVENOUS at 03:03

## 2019-01-01 RX ADMIN — HEPARIN SODIUM AND DEXTROSE 12.59 UNITS/KG/HR: 10000; 5 INJECTION INTRAVENOUS at 05:07

## 2019-01-01 RX ADMIN — INSULIN ASPART 3 UNITS: 100 INJECTION, SOLUTION INTRAVENOUS; SUBCUTANEOUS at 09:02

## 2019-01-01 RX ADMIN — PANTOPRAZOLE SODIUM 40 MG: 40 TABLET, DELAYED RELEASE ORAL at 08:07

## 2019-01-01 RX ADMIN — MIDODRINE HYDROCHLORIDE 15 MG: 5 TABLET ORAL at 09:07

## 2019-01-01 RX ADMIN — ALTEPLASE 2 MG: 2.2 INJECTION, POWDER, LYOPHILIZED, FOR SOLUTION INTRAVENOUS at 04:04

## 2019-01-01 RX ADMIN — ATORVASTATIN CALCIUM 40 MG: 20 TABLET, FILM COATED ORAL at 09:04

## 2019-01-01 RX ADMIN — OXYCODONE HYDROCHLORIDE 15 MG: 10 TABLET ORAL at 01:07

## 2019-01-01 RX ADMIN — ASPIRIN 81 MG: 81 TABLET, COATED ORAL at 09:02

## 2019-01-01 RX ADMIN — SEVELAMER CARBONATE 800 MG: 800 TABLET, FILM COATED ORAL at 05:04

## 2019-01-01 RX ADMIN — MIDODRINE HYDROCHLORIDE 10 MG: 5 TABLET ORAL at 07:05

## 2019-01-01 RX ADMIN — ACETAMINOPHEN 650 MG: 325 TABLET ORAL at 09:07

## 2019-01-01 RX ADMIN — PANTOPRAZOLE SODIUM 40 MG: 40 TABLET, DELAYED RELEASE ORAL at 09:04

## 2019-01-01 RX ADMIN — SEVELAMER CARBONATE 800 MG: 800 TABLET, FILM COATED ORAL at 11:04

## 2019-01-01 RX ADMIN — GABAPENTIN 300 MG: 300 CAPSULE ORAL at 09:06

## 2019-01-01 RX ADMIN — LACTOBACILLUS TAB 4 TABLET: TAB at 12:03

## 2019-01-01 RX ADMIN — HEPARIN SODIUM AND DEXTROSE 12.59 UNITS/KG/HR: 10000; 5 INJECTION INTRAVENOUS at 02:07

## 2019-01-01 RX ADMIN — MIDODRINE HYDROCHLORIDE 10 MG: 5 TABLET ORAL at 04:05

## 2019-01-01 RX ADMIN — KETAMINE HYDROCHLORIDE 25 MG: 50 INJECTION, SOLUTION, CONCENTRATE INTRAMUSCULAR; INTRAVENOUS at 09:03

## 2019-01-01 RX ADMIN — EPINEPHRINE 0.9 MCG/KG/MIN: 1 INJECTION PARENTERAL at 07:07

## 2019-01-01 RX ADMIN — SODIUM CHLORIDE 200 ML: 0.9 INJECTION, SOLUTION INTRAVENOUS at 01:07

## 2019-01-01 RX ADMIN — HYDROMORPHONE HYDROCHLORIDE 1 MG: 2 INJECTION INTRAMUSCULAR; INTRAVENOUS; SUBCUTANEOUS at 01:03

## 2019-01-01 RX ADMIN — MIDODRINE HYDROCHLORIDE 10 MG: 5 TABLET ORAL at 06:04

## 2019-01-01 RX ADMIN — METOCLOPRAMIDE 10 MG: 5 INJECTION, SOLUTION INTRAMUSCULAR; INTRAVENOUS at 06:03

## 2019-01-01 RX ADMIN — BENZONATATE 100 MG: 100 CAPSULE ORAL at 08:04

## 2019-01-01 RX ADMIN — IOHEXOL 100 ML: 350 INJECTION, SOLUTION INTRAVENOUS at 10:07

## 2019-01-01 RX ADMIN — LOPERAMIDE HYDROCHLORIDE 2 MG: 2 CAPSULE ORAL at 08:03

## 2019-01-01 RX ADMIN — CINACALCET HYDROCHLORIDE 60 MG: 30 TABLET, FILM COATED ORAL at 11:05

## 2019-01-01 RX ADMIN — SEVELAMER CARBONATE 800 MG: 800 TABLET, FILM COATED ORAL at 09:04

## 2019-01-01 RX ADMIN — MIDODRINE HYDROCHLORIDE 10 MG: 5 TABLET ORAL at 06:05

## 2019-01-01 RX ADMIN — SUCRALFATE 1 G: 1 TABLET ORAL at 09:03

## 2019-01-01 RX ADMIN — ACETAMINOPHEN 650 MG: 325 TABLET ORAL at 10:07

## 2019-01-01 RX ADMIN — INSULIN ASPART 2 UNITS: 100 INJECTION, SOLUTION INTRAVENOUS; SUBCUTANEOUS at 05:04

## 2019-01-01 RX ADMIN — VASOPRESSIN 0.08 UNITS/MIN: 20 INJECTION INTRAVENOUS at 06:07

## 2019-01-01 RX ADMIN — GABAPENTIN 300 MG: 300 CAPSULE ORAL at 08:07

## 2019-01-01 RX ADMIN — LACTOBACILLUS TAB 4 TABLET: TAB at 05:03

## 2019-01-01 RX ADMIN — CARVEDILOL 12.5 MG: 12.5 TABLET, FILM COATED ORAL at 08:03

## 2019-01-01 RX ADMIN — SEVELAMER CARBONATE 800 MG: 800 TABLET, FILM COATED ORAL at 05:07

## 2019-01-01 RX ADMIN — SEVELAMER CARBONATE 800 MG: 800 TABLET, FILM COATED ORAL at 08:04

## 2019-01-01 RX ADMIN — GABAPENTIN 100 MG: 100 CAPSULE ORAL at 03:02

## 2019-01-01 RX ADMIN — PROMETHAZINE HYDROCHLORIDE 6.25 MG: 25 INJECTION INTRAMUSCULAR; INTRAVENOUS at 07:07

## 2019-01-01 RX ADMIN — ALTEPLASE 4 MG: 2.2 INJECTION, POWDER, LYOPHILIZED, FOR SOLUTION INTRAVENOUS at 08:04

## 2019-01-01 RX ADMIN — SEVELAMER CARBONATE 800 MG: 800 TABLET, FILM COATED ORAL at 06:04

## 2019-01-01 RX ADMIN — ATORVASTATIN CALCIUM 40 MG: 20 TABLET, FILM COATED ORAL at 08:07

## 2019-01-01 RX ADMIN — SODIUM CHLORIDE, SODIUM LACTATE, CALCIUM CHLORIDE, MAGNESIUM CHLORIDE AND DEXTROSE: 2.5; 538; 448; 18.3; 5.08 INJECTION, SOLUTION INTRAPERITONEAL at 07:02

## 2019-01-01 RX ADMIN — GABAPENTIN 100 MG: 100 CAPSULE ORAL at 09:02

## 2019-01-01 RX ADMIN — LOPERAMIDE HYDROCHLORIDE 2 MG: 2 CAPSULE ORAL at 05:04

## 2019-01-01 RX ADMIN — SEVELAMER CARBONATE 800 MG: 800 TABLET, FILM COATED ORAL at 05:03

## 2019-01-01 RX ADMIN — LACTOBACILLUS TAB 4 TABLET: TAB at 05:02

## 2019-01-01 RX ADMIN — SODIUM BICARBONATE 650 MG TABLET 650 MG: at 08:03

## 2019-01-01 RX ADMIN — DEXMEDETOMIDINE HYDROCHLORIDE 1 MCG/KG/HR: 100 INJECTION, SOLUTION, CONCENTRATE INTRAVENOUS at 01:07

## 2019-01-01 RX ADMIN — SEVELAMER CARBONATE 800 MG: 800 TABLET, FILM COATED ORAL at 11:05

## 2019-01-01 RX ADMIN — MIDODRINE HYDROCHLORIDE 10 MG: 5 TABLET ORAL at 09:07

## 2019-01-01 RX ADMIN — LACTOBACILLUS TAB 4 TABLET: TAB at 10:03

## 2019-01-01 RX ADMIN — BENZONATATE 100 MG: 100 CAPSULE ORAL at 10:04

## 2019-01-01 RX ADMIN — SODIUM BICARBONATE 650 MG TABLET 1300 MG: at 09:04

## 2019-01-01 RX ADMIN — CLOPIDOGREL BISULFATE 75 MG: 75 TABLET, FILM COATED ORAL at 08:04

## 2019-01-01 RX ADMIN — GABAPENTIN 300 MG: 300 CAPSULE ORAL at 08:04

## 2019-01-01 RX ADMIN — POTASSIUM CHLORIDE 20 MEQ: 20 TABLET, EXTENDED RELEASE ORAL at 08:02

## 2019-01-01 RX ADMIN — VANCOMYCIN HYDROCHLORIDE 1000 MG: 1 INJECTION, POWDER, LYOPHILIZED, FOR SOLUTION INTRAVENOUS at 04:04

## 2019-01-01 RX ADMIN — LOPERAMIDE HYDROCHLORIDE 2 MG: 2 CAPSULE ORAL at 11:04

## 2019-01-01 RX ADMIN — ASPIRIN 81 MG: 81 TABLET, COATED ORAL at 10:03

## 2019-01-01 RX ADMIN — INSULIN DETEMIR 10 UNITS: 100 INJECTION, SOLUTION SUBCUTANEOUS at 08:03

## 2019-01-01 RX ADMIN — MAGNESIUM SULFATE IN WATER 2 G: 40 INJECTION, SOLUTION INTRAVENOUS at 12:04

## 2019-01-01 RX ADMIN — PROCHLORPERAZINE EDISYLATE 10 MG: 5 INJECTION INTRAMUSCULAR; INTRAVENOUS at 11:03

## 2019-01-01 RX ADMIN — MINERAL OIL, PETROLATUM, PHENYLEPHRINE HCL 1 APPLICATOR: 2.5; 140; 749 OINTMENT TOPICAL at 09:05

## 2019-01-01 RX ADMIN — CLOPIDOGREL 75 MG: 75 TABLET, FILM COATED ORAL at 12:03

## 2019-01-01 RX ADMIN — MAGNESIUM SULFATE IN WATER 2 G: 40 INJECTION, SOLUTION INTRAVENOUS at 09:03

## 2019-01-01 RX ADMIN — PANTOPRAZOLE SODIUM 40 MG: 40 TABLET, DELAYED RELEASE ORAL at 08:05

## 2019-01-01 RX ADMIN — GABAPENTIN 300 MG: 300 CAPSULE ORAL at 09:05

## 2019-01-01 RX ADMIN — PIPERACILLIN AND TAZOBACTAM 4.5 G: 4; .5 INJECTION, POWDER, LYOPHILIZED, FOR SOLUTION INTRAVENOUS; PARENTERAL at 08:04

## 2019-01-01 RX ADMIN — NEPHROCAP 1 CAPSULE: 1 CAP ORAL at 08:04

## 2019-01-01 RX ADMIN — PANTOPRAZOLE SODIUM 40 MG: 40 TABLET, DELAYED RELEASE ORAL at 08:04

## 2019-01-01 RX ADMIN — GABAPENTIN 300 MG: 300 CAPSULE ORAL at 08:05

## 2019-01-01 RX ADMIN — LACTOBACILLUS TAB 4 TABLET: TAB at 08:03

## 2019-01-01 RX ADMIN — HYDROMORPHONE HYDROCHLORIDE 1 MG: 2 INJECTION, SOLUTION INTRAMUSCULAR; INTRAVENOUS; SUBCUTANEOUS at 01:02

## 2019-01-01 RX ADMIN — MIDODRINE HYDROCHLORIDE 20 MG: 5 TABLET ORAL at 11:07

## 2019-01-01 RX ADMIN — SEVELAMER CARBONATE 800 MG: 800 TABLET, FILM COATED ORAL at 02:03

## 2019-01-01 RX ADMIN — NEPHROCAP 1 CAPSULE: 1 CAP ORAL at 08:05

## 2019-01-01 RX ADMIN — MIDODRINE HYDROCHLORIDE 20 MG: 5 TABLET ORAL at 09:07

## 2019-01-01 RX ADMIN — IPRATROPIUM BROMIDE AND ALBUTEROL SULFATE 3 ML: .5; 3 SOLUTION RESPIRATORY (INHALATION) at 10:04

## 2019-01-01 RX ADMIN — SODIUM CHLORIDE: 0.9 INJECTION, SOLUTION INTRAVENOUS at 01:07

## 2019-01-01 RX ADMIN — ATORVASTATIN CALCIUM 40 MG: 20 TABLET, FILM COATED ORAL at 08:03

## 2019-01-01 RX ADMIN — LOPERAMIDE HYDROCHLORIDE 2 MG: 2 CAPSULE ORAL at 12:04

## 2019-01-01 RX ADMIN — INSULIN ASPART 1 UNITS: 100 INJECTION, SOLUTION INTRAVENOUS; SUBCUTANEOUS at 08:02

## 2019-01-01 RX ADMIN — MIDODRINE HYDROCHLORIDE 10 MG: 5 TABLET ORAL at 07:07

## 2019-01-01 RX ADMIN — CALCITRIOL CAPSULES 0.25 MCG 0.5 MCG: 0.25 CAPSULE ORAL at 09:05

## 2019-01-01 RX ADMIN — GABAPENTIN 100 MG: 100 CAPSULE ORAL at 02:04

## 2019-01-01 RX ADMIN — RAMELTEON 8 MG: 8 TABLET, FILM COATED ORAL at 10:03

## 2019-01-01 RX ADMIN — PIPERACILLIN AND TAZOBACTAM 4.5 G: 4; .5 INJECTION, POWDER, LYOPHILIZED, FOR SOLUTION INTRAVENOUS; PARENTERAL at 01:07

## 2019-01-01 RX ADMIN — ASPIRIN 81 MG: 81 TABLET, COATED ORAL at 08:03

## 2019-01-01 RX ADMIN — SENNOSIDES 8.6 MG: 8.6 TABLET, FILM COATED ORAL at 09:07

## 2019-01-01 RX ADMIN — CLOPIDOGREL BISULFATE 75 MG: 75 TABLET, FILM COATED ORAL at 08:03

## 2019-01-01 RX ADMIN — CLOPIDOGREL 75 MG: 75 TABLET, FILM COATED ORAL at 08:05

## 2019-01-01 RX ADMIN — ONDANSETRON 4 MG: 2 INJECTION INTRAMUSCULAR; INTRAVENOUS at 11:07

## 2019-01-01 RX ADMIN — INSULIN DETEMIR 10 UNITS: 100 INJECTION, SOLUTION SUBCUTANEOUS at 11:04

## 2019-01-01 RX ADMIN — ONDANSETRON 4 MG: 2 INJECTION INTRAMUSCULAR; INTRAVENOUS at 04:02

## 2019-01-01 RX ADMIN — MAGNESIUM SULFATE IN WATER 2 G: 40 INJECTION, SOLUTION INTRAVENOUS at 12:03

## 2019-01-01 RX ADMIN — CLOPIDOGREL BISULFATE 75 MG: 75 TABLET, FILM COATED ORAL at 09:02

## 2019-01-01 RX ADMIN — SEVELAMER CARBONATE 800 MG: 800 TABLET, FILM COATED ORAL at 01:03

## 2019-01-01 RX ADMIN — ACETAMINOPHEN 650 MG: 325 TABLET ORAL at 05:07

## 2019-01-01 RX ADMIN — CLOPIDOGREL 75 MG: 75 TABLET, FILM COATED ORAL at 08:04

## 2019-01-01 RX ADMIN — FERROUS SULFATE TAB EC 325 MG (65 MG FE EQUIVALENT) 325 MG: 325 (65 FE) TABLET DELAYED RESPONSE at 03:07

## 2019-01-01 RX ADMIN — IPRATROPIUM BROMIDE AND ALBUTEROL SULFATE 3 ML: .5; 3 SOLUTION RESPIRATORY (INHALATION) at 01:05

## 2019-01-01 RX ADMIN — HYDROMORPHONE HYDROCHLORIDE 1 MG: 2 INJECTION, SOLUTION INTRAMUSCULAR; INTRAVENOUS; SUBCUTANEOUS at 05:02

## 2019-01-01 RX ADMIN — ATORVASTATIN CALCIUM 40 MG: 20 TABLET, FILM COATED ORAL at 08:05

## 2019-01-01 RX ADMIN — RAMELTEON 8 MG: 8 TABLET, FILM COATED ORAL at 09:03

## 2019-01-01 RX ADMIN — SEVELAMER CARBONATE 800 MG: 800 TABLET, FILM COATED ORAL at 08:07

## 2019-01-01 RX ADMIN — NEPHROCAP 1 CAPSULE: 1 CAP ORAL at 08:03

## 2019-01-01 RX ADMIN — CEFTAZIDIME: 1 INJECTION, POWDER, FOR SOLUTION INTRAMUSCULAR; INTRAVENOUS at 09:02

## 2019-01-01 RX ADMIN — MAGNESIUM OXIDE TAB 400 MG (241.3 MG ELEMENTAL MG) 400 MG: 400 (241.3 MG) TAB at 08:05

## 2019-01-01 RX ADMIN — INSULIN ASPART 2 UNITS: 100 INJECTION, SOLUTION INTRAVENOUS; SUBCUTANEOUS at 06:04

## 2019-01-01 RX ADMIN — PANTOPRAZOLE SODIUM 40 MG: 40 TABLET, DELAYED RELEASE ORAL at 09:05

## 2019-01-01 RX ADMIN — MIDODRINE HYDROCHLORIDE 15 MG: 5 TABLET ORAL at 02:07

## 2019-01-01 RX ADMIN — CETIRIZINE HYDROCHLORIDE 5 MG: 5 TABLET ORAL at 09:05

## 2019-01-01 RX ADMIN — ASPIRIN 81 MG: 81 TABLET, COATED ORAL at 09:04

## 2019-01-01 RX ADMIN — INSULIN DETEMIR 10 UNITS: 100 INJECTION, SOLUTION SUBCUTANEOUS at 09:03

## 2019-01-01 RX ADMIN — SODIUM CHLORIDE 500 ML: 0.9 INJECTION, SOLUTION INTRAVENOUS at 07:03

## 2019-01-01 RX ADMIN — HYDROMORPHONE HYDROCHLORIDE 1 MG: 2 INJECTION INTRAMUSCULAR; INTRAVENOUS; SUBCUTANEOUS at 04:03

## 2019-01-01 RX ADMIN — BENZONATATE 100 MG: 100 CAPSULE ORAL at 11:04

## 2019-01-01 RX ADMIN — SUCRALFATE 1 G: 1 TABLET ORAL at 05:03

## 2019-01-01 RX ADMIN — LOPERAMIDE HYDROCHLORIDE 2 MG: 2 CAPSULE ORAL at 09:03

## 2019-01-01 RX ADMIN — NOREPINEPHRINE BITARTRATE 0.85 MCG/KG/MIN: 1 INJECTION, SOLUTION, CONCENTRATE INTRAVENOUS at 10:07

## 2019-01-01 RX ADMIN — ERYTHROPOIETIN 20000 UNITS: 20000 INJECTION, SOLUTION INTRAVENOUS; SUBCUTANEOUS at 11:03

## 2019-01-01 RX ADMIN — ASPIRIN 81 MG: 81 TABLET, COATED ORAL at 09:05

## 2019-01-01 RX ADMIN — SODIUM BICARBONATE 650 MG TABLET 1300 MG: at 10:04

## 2019-01-01 RX ADMIN — NEPHROCAP 1 CAPSULE: 1 CAP ORAL at 09:05

## 2019-01-01 RX ADMIN — SIMETHICONE 125 MG: 125 TABLET, CHEWABLE ORAL at 09:02

## 2019-01-01 RX ADMIN — MIDAZOLAM HYDROCHLORIDE 1 MG: 1 INJECTION, SOLUTION INTRAMUSCULAR; INTRAVENOUS at 01:07

## 2019-01-01 RX ADMIN — ASPIRIN 81 MG: 81 TABLET, COATED ORAL at 09:06

## 2019-01-01 RX ADMIN — SEVELAMER CARBONATE 800 MG: 800 TABLET, FILM COATED ORAL at 12:04

## 2019-01-01 RX ADMIN — MIDODRINE HYDROCHLORIDE 10 MG: 5 TABLET ORAL at 08:05

## 2019-01-01 RX ADMIN — Medication 400 MG: at 09:05

## 2019-01-01 RX ADMIN — OXYCODONE HYDROCHLORIDE 15 MG: 10 TABLET ORAL at 11:07

## 2019-01-01 RX ADMIN — NEPHROCAP 1 CAPSULE: 1 CAP ORAL at 10:03

## 2019-01-01 RX ADMIN — SODIUM BICARBONATE 650 MG TABLET 650 MG: at 02:03

## 2019-01-01 RX ADMIN — ONDANSETRON 4 MG: 2 INJECTION INTRAMUSCULAR; INTRAVENOUS at 09:03

## 2019-01-01 RX ADMIN — INSULIN ASPART 3 UNITS: 100 INJECTION, SOLUTION INTRAVENOUS; SUBCUTANEOUS at 05:05

## 2019-01-01 RX ADMIN — IPRATROPIUM BROMIDE AND ALBUTEROL SULFATE 3 ML: .5; 3 SOLUTION RESPIRATORY (INHALATION) at 06:05

## 2019-01-01 RX ADMIN — CHLORHEXIDINE GLUCONATE 0.12% ORAL RINSE 15 ML: 1.2 LIQUID ORAL at 08:07

## 2019-01-01 RX ADMIN — SULFAMETHOXAZOLE AND TRIMETHOPRIM 500 MG: 80; 16 INJECTION, SOLUTION, CONCENTRATE INTRAVENOUS at 03:03

## 2019-01-01 RX ADMIN — BENZONATATE 100 MG: 100 CAPSULE ORAL at 09:04

## 2019-01-01 RX ADMIN — NEPHROCAP 1 CAPSULE: 1 CAP ORAL at 08:02

## 2019-01-01 RX ADMIN — CINACALCET HYDROCHLORIDE 90 MG: 30 TABLET, COATED ORAL at 08:05

## 2019-01-01 RX ADMIN — HYDROMORPHONE HYDROCHLORIDE 1 MG: 2 INJECTION, SOLUTION INTRAMUSCULAR; INTRAVENOUS; SUBCUTANEOUS at 08:02

## 2019-01-01 RX ADMIN — SODIUM BICARBONATE 650 MG TABLET 1300 MG: at 11:03

## 2019-01-01 RX ADMIN — SUCRALFATE 1 G: 1 TABLET ORAL at 06:03

## 2019-01-01 RX ADMIN — ONDANSETRON 8 MG: 2 INJECTION INTRAMUSCULAR; INTRAVENOUS at 09:03

## 2019-01-01 RX ADMIN — INSULIN DETEMIR 10 UNITS: 100 INJECTION, SOLUTION SUBCUTANEOUS at 09:04

## 2019-01-01 RX ADMIN — CALCITRIOL 0.5 MCG: 0.25 CAPSULE ORAL at 09:02

## 2019-01-01 RX ADMIN — HYDROMORPHONE HYDROCHLORIDE 1 MG: 2 INJECTION INTRAMUSCULAR; INTRAVENOUS; SUBCUTANEOUS at 04:02

## 2019-01-01 RX ADMIN — SODIUM BICARBONATE 650 MG TABLET 1300 MG: at 09:03

## 2019-01-01 RX ADMIN — ATORVASTATIN CALCIUM 40 MG: 20 TABLET, FILM COATED ORAL at 09:07

## 2019-01-01 RX ADMIN — MIDODRINE HYDROCHLORIDE 10 MG: 5 TABLET ORAL at 05:05

## 2019-01-01 RX ADMIN — ACETAMINOPHEN 650 MG: 325 TABLET ORAL at 06:07

## 2019-01-01 RX ADMIN — PHENYLEPHRINE HYDROCHLORIDE 200 MCG: 10 INJECTION INTRAVENOUS at 01:03

## 2019-01-01 RX ADMIN — VANCOMYCIN HYDROCHLORIDE: 1 INJECTION, POWDER, FOR SOLUTION INTRAVENOUS at 06:02

## 2019-01-01 RX ADMIN — HEPARIN SODIUM AND DEXTROSE 18 UNITS/KG/HR: 10000; 5 INJECTION INTRAVENOUS at 12:07

## 2019-01-01 RX ADMIN — GABAPENTIN 300 MG: 300 CAPSULE ORAL at 12:07

## 2019-01-01 RX ADMIN — LIDOCAINE HYDROCHLORIDE 1 ML: 10 INJECTION INFILTRATION; PERINEURAL at 12:07

## 2019-01-01 RX ADMIN — NEPHROCAP 1 CAPSULE: 1 CAP ORAL at 09:02

## 2019-01-01 RX ADMIN — SUCRALFATE 1 G: 1 TABLET ORAL at 11:03

## 2019-01-01 RX ADMIN — SODIUM CHLORIDE: 0.9 INJECTION, SOLUTION INTRAVENOUS at 10:04

## 2019-01-01 RX ADMIN — SODIUM BICARBONATE 650 MG TABLET 650 MG: at 11:03

## 2019-01-01 RX ADMIN — PHENYLEPHRINE HYDROCHLORIDE 100 MCG: 10 INJECTION INTRAVENOUS at 03:07

## 2019-01-01 RX ADMIN — ONDANSETRON 8 MG: 2 INJECTION INTRAMUSCULAR; INTRAVENOUS at 08:03

## 2019-01-01 RX ADMIN — HEPARIN SODIUM 2000 UNITS: 1000 INJECTION, SOLUTION INTRAVENOUS; SUBCUTANEOUS at 08:04

## 2019-01-01 RX ADMIN — GUAIFENESIN AND DEXTROMETHORPHAN HYDROBROMIDE 2 TABLET: 600; 30 TABLET, EXTENDED RELEASE ORAL at 08:05

## 2019-01-01 RX ADMIN — INSULIN ASPART 3 UNITS: 100 INJECTION, SOLUTION INTRAVENOUS; SUBCUTANEOUS at 09:03

## 2019-01-01 RX ADMIN — MINERAL OIL, PETROLATUM, PHENYLEPHRINE HCL 1 APPLICATOR: 2.5; 140; 749 OINTMENT TOPICAL at 08:05

## 2019-01-01 RX ADMIN — CLOPIDOGREL 75 MG: 75 TABLET, FILM COATED ORAL at 09:05

## 2019-01-01 RX ADMIN — GABAPENTIN 300 MG: 300 CAPSULE ORAL at 09:07

## 2019-01-01 RX ADMIN — PANTOPRAZOLE SODIUM 40 MG: 40 TABLET, DELAYED RELEASE ORAL at 10:04

## 2019-01-01 RX ADMIN — GABAPENTIN 100 MG: 100 CAPSULE ORAL at 08:04

## 2019-01-01 RX ADMIN — LOPERAMIDE HYDROCHLORIDE 2 MG: 2 CAPSULE ORAL at 03:04

## 2019-01-01 RX ADMIN — HEPARIN SODIUM 3000 UNITS: 1000 INJECTION, SOLUTION INTRAVENOUS; SUBCUTANEOUS at 12:03

## 2019-01-01 RX ADMIN — FERROUS SULFATE TAB EC 325 MG (65 MG FE EQUIVALENT) 325 MG: 325 (65 FE) TABLET DELAYED RESPONSE at 08:07

## 2019-01-01 RX ADMIN — ONDANSETRON 4 MG: 2 INJECTION INTRAMUSCULAR; INTRAVENOUS at 02:07

## 2019-01-01 RX ADMIN — SEVELAMER CARBONATE 800 MG: 800 TABLET, FILM COATED ORAL at 05:05

## 2019-01-01 RX ADMIN — ONDANSETRON 4 MG: 2 INJECTION INTRAMUSCULAR; INTRAVENOUS at 09:02

## 2019-01-01 RX ADMIN — ROCURONIUM BROMIDE 50 MG: 10 INJECTION, SOLUTION INTRAVENOUS at 02:07

## 2019-01-01 RX ADMIN — SODIUM CHLORIDE: 0.9 INJECTION, SOLUTION INTRAVENOUS at 06:04

## 2019-01-01 RX ADMIN — HYDROMORPHONE HYDROCHLORIDE 1 MG: 2 INJECTION INTRAMUSCULAR; INTRAVENOUS; SUBCUTANEOUS at 07:02

## 2019-01-01 RX ADMIN — LISINOPRIL 2.5 MG: 2.5 TABLET ORAL at 08:04

## 2019-01-01 RX ADMIN — ATORVASTATIN CALCIUM 40 MG: 40 TABLET, FILM COATED ORAL at 08:02

## 2019-01-01 RX ADMIN — PANTOPRAZOLE SODIUM 40 MG: 40 INJECTION, POWDER, LYOPHILIZED, FOR SOLUTION INTRAVENOUS at 02:03

## 2019-01-01 RX ADMIN — SUCRALFATE 1 G: 1 TABLET ORAL at 07:03

## 2019-01-01 RX ADMIN — PANTOPRAZOLE SODIUM 40 MG: 40 TABLET, DELAYED RELEASE ORAL at 08:03

## 2019-01-01 RX ADMIN — GABAPENTIN 300 MG: 300 CAPSULE ORAL at 10:07

## 2019-01-01 RX ADMIN — CALCITRIOL 0.5 MCG: 0.25 CAPSULE ORAL at 08:05

## 2019-01-01 RX ADMIN — ALTEPLASE 6 MG: 2.2 INJECTION, POWDER, LYOPHILIZED, FOR SOLUTION INTRAVENOUS at 05:06

## 2019-01-01 RX ADMIN — MIDODRINE HYDROCHLORIDE 10 MG: 5 TABLET ORAL at 12:05

## 2019-01-01 RX ADMIN — IPRATROPIUM BROMIDE AND ALBUTEROL SULFATE 3 ML: .5; 3 SOLUTION RESPIRATORY (INHALATION) at 07:04

## 2019-01-01 RX ADMIN — INSULIN ASPART 2 UNITS: 100 INJECTION, SOLUTION INTRAVENOUS; SUBCUTANEOUS at 05:03

## 2019-01-01 RX ADMIN — SODIUM CHLORIDE 250 ML: 0.9 INJECTION, SOLUTION INTRAVENOUS at 09:03

## 2019-01-01 RX ADMIN — CALCIUM 500 MG: 500 TABLET ORAL at 08:05

## 2019-01-01 RX ADMIN — POLYETHYLENE GLYCOL 3350 17 G: 17 POWDER, FOR SOLUTION ORAL at 03:07

## 2019-01-01 RX ADMIN — INSULIN ASPART 1 UNITS: 100 INJECTION, SOLUTION INTRAVENOUS; SUBCUTANEOUS at 09:04

## 2019-01-01 RX ADMIN — SEVELAMER CARBONATE 800 MG: 800 TABLET, FILM COATED ORAL at 11:03

## 2019-01-01 RX ADMIN — PHENYLEPHRINE HYDROCHLORIDE 200 MCG: 10 INJECTION INTRAVENOUS at 02:07

## 2019-01-01 RX ADMIN — INSULIN ASPART 2 UNITS: 100 INJECTION, SOLUTION INTRAVENOUS; SUBCUTANEOUS at 11:02

## 2019-01-01 RX ADMIN — CALCITRIOL 0.5 MCG: 0.25 CAPSULE ORAL at 06:03

## 2019-01-01 RX ADMIN — PANTOPRAZOLE SODIUM 40 MG: 40 INJECTION, POWDER, FOR SOLUTION INTRAVENOUS at 09:07

## 2019-01-01 RX ADMIN — IPRATROPIUM BROMIDE AND ALBUTEROL SULFATE 3 ML: .5; 3 SOLUTION RESPIRATORY (INHALATION) at 12:05

## 2019-01-01 RX ADMIN — ONDANSETRON 4 MG: 2 INJECTION INTRAMUSCULAR; INTRAVENOUS at 10:07

## 2019-01-01 RX ADMIN — PHENYLEPHRINE HYDROCHLORIDE 200 MCG: 10 INJECTION INTRAVENOUS at 04:07

## 2019-01-01 RX ADMIN — DOBUTAMINE IN DEXTROSE 5 MCG/KG/MIN: 200 INJECTION, SOLUTION INTRAVENOUS at 12:07

## 2019-01-01 RX ADMIN — CALCITRIOL 0.5 MCG: 0.25 CAPSULE ORAL at 06:02

## 2019-01-01 RX ADMIN — PROCHLORPERAZINE EDISYLATE 10 MG: 5 INJECTION INTRAMUSCULAR; INTRAVENOUS at 06:03

## 2019-01-01 RX ADMIN — SEVELAMER CARBONATE 800 MG: 800 TABLET, FILM COATED ORAL at 12:07

## 2019-01-01 RX ADMIN — SEVELAMER CARBONATE 800 MG: 800 TABLET, FILM COATED ORAL at 08:05

## 2019-01-01 RX ADMIN — CLOPIDOGREL 75 MG: 75 TABLET, FILM COATED ORAL at 10:04

## 2019-01-01 RX ADMIN — ASPIRIN 81 MG: 81 TABLET, COATED ORAL at 08:04

## 2019-01-01 RX ADMIN — LOPERAMIDE HYDROCHLORIDE 2 MG: 2 CAPSULE ORAL at 11:03

## 2019-01-01 RX ADMIN — IPRATROPIUM BROMIDE AND ALBUTEROL SULFATE 3 ML: .5; 3 SOLUTION RESPIRATORY (INHALATION) at 09:04

## 2019-01-01 RX ADMIN — CINACALCET HYDROCHLORIDE 90 MG: 30 TABLET, COATED ORAL at 08:02

## 2019-01-01 RX ADMIN — ATORVASTATIN CALCIUM 40 MG: 40 TABLET, FILM COATED ORAL at 09:03

## 2019-01-01 RX ADMIN — PROMETHAZINE HYDROCHLORIDE 6.25 MG: 25 INJECTION INTRAMUSCULAR; INTRAVENOUS at 03:07

## 2019-01-01 RX ADMIN — SEVELAMER CARBONATE 800 MG: 800 TABLET, FILM COATED ORAL at 06:03

## 2019-01-01 RX ADMIN — SODIUM BICARBONATE 650 MG TABLET 1300 MG: at 08:03

## 2019-01-01 RX ADMIN — CALCITRIOL CAPSULES 0.25 MCG 0.5 MCG: 0.25 CAPSULE ORAL at 03:05

## 2019-01-01 RX ADMIN — HYDROMORPHONE HYDROCHLORIDE 1 MG: 2 INJECTION INTRAMUSCULAR; INTRAVENOUS; SUBCUTANEOUS at 12:03

## 2019-01-01 RX ADMIN — Medication 1 CAPSULE: at 08:04

## 2019-01-01 RX ADMIN — SEVELAMER CARBONATE 800 MG: 800 TABLET, FILM COATED ORAL at 12:05

## 2019-01-01 RX ADMIN — ASPIRIN 81 MG: 81 TABLET, COATED ORAL at 09:07

## 2019-01-01 RX ADMIN — HEPARIN SODIUM 500 UNITS: 1000 INJECTION, SOLUTION INTRAVENOUS; SUBCUTANEOUS at 12:03

## 2019-01-01 RX ADMIN — ALTEPLASE 4 MG: 2.2 INJECTION, POWDER, LYOPHILIZED, FOR SOLUTION INTRAVENOUS at 09:03

## 2019-01-01 RX ADMIN — ACETAMINOPHEN 650 MG: 325 TABLET ORAL at 03:07

## 2019-01-01 RX ADMIN — CETIRIZINE HYDROCHLORIDE 5 MG: 5 TABLET ORAL at 08:05

## 2019-01-01 RX ADMIN — CIPROFLOXACIN HYDROCHLORIDE 500 MG: 500 TABLET, FILM COATED ORAL at 09:05

## 2019-01-01 RX ADMIN — SEVELAMER CARBONATE 800 MG: 800 TABLET, FILM COATED ORAL at 01:04

## 2019-01-01 RX ADMIN — LOPERAMIDE HYDROCHLORIDE 2 MG: 2 CAPSULE ORAL at 03:03

## 2019-01-01 RX ADMIN — MORPHINE SULFATE 6 MG: 2 INJECTION, SOLUTION INTRAMUSCULAR; INTRAVENOUS at 08:07

## 2019-01-01 RX ADMIN — ALTEPLASE 2 MG: 2.2 INJECTION, POWDER, LYOPHILIZED, FOR SOLUTION INTRAVENOUS at 08:04

## 2019-01-01 RX ADMIN — PHENYLEPHRINE HYDROCHLORIDE 100 MCG: 10 INJECTION INTRAVENOUS at 12:03

## 2019-01-01 RX ADMIN — SODIUM BICARBONATE 650 MG TABLET 1300 MG: at 12:03

## 2019-01-01 RX ADMIN — BENZONATATE 100 MG: 100 CAPSULE ORAL at 07:04

## 2019-01-01 RX ADMIN — INSULIN ASPART 4 UNITS: 100 INJECTION, SOLUTION INTRAVENOUS; SUBCUTANEOUS at 09:02

## 2019-01-01 RX ADMIN — SODIUM BICARBONATE 650 MG TABLET 650 MG: at 03:03

## 2019-01-01 RX ADMIN — FENTANYL CITRATE 50 MCG: 50 INJECTION, SOLUTION INTRAMUSCULAR; INTRAVENOUS at 01:07

## 2019-01-01 RX ADMIN — ATORVASTATIN CALCIUM 40 MG: 40 TABLET, FILM COATED ORAL at 08:03

## 2019-01-01 RX ADMIN — FERROUS SULFATE TAB EC 325 MG (65 MG FE EQUIVALENT) 325 MG: 325 (65 FE) TABLET DELAYED RESPONSE at 09:07

## 2019-01-01 RX ADMIN — PROMETHAZINE HYDROCHLORIDE 12.5 MG: 12.5 TABLET ORAL at 01:02

## 2019-01-01 RX ADMIN — LACTOBACILLUS TAB 4 TABLET: TAB at 01:03

## 2019-01-01 RX ADMIN — SODIUM CHLORIDE: 0.9 INJECTION, SOLUTION INTRAVENOUS at 11:03

## 2019-01-01 RX ADMIN — LISINOPRIL 2.5 MG: 2.5 TABLET ORAL at 02:04

## 2019-01-01 RX ADMIN — Medication 400 MG: at 08:04

## 2019-01-01 RX ADMIN — GABAPENTIN 600 MG: 300 CAPSULE ORAL at 03:04

## 2019-01-01 RX ADMIN — GABAPENTIN 100 MG: 100 CAPSULE ORAL at 09:03

## 2019-01-01 RX ADMIN — OXACILLIN: 2 INJECTION, POWDER, FOR SOLUTION INTRAMUSCULAR; INTRAVENOUS at 12:03

## 2019-01-01 RX ADMIN — EPINEPHRINE 0.8 MCG/KG/MIN: 1 INJECTION PARENTERAL at 06:07

## 2019-01-01 RX ADMIN — PIPERACILLIN AND TAZOBACTAM 4.5 G: 4; .5 INJECTION, POWDER, LYOPHILIZED, FOR SOLUTION INTRAVENOUS; PARENTERAL at 09:04

## 2019-01-01 RX ADMIN — INSULIN ASPART 2 UNITS: 100 INJECTION, SOLUTION INTRAVENOUS; SUBCUTANEOUS at 01:03

## 2019-01-01 RX ADMIN — PROMETHAZINE HYDROCHLORIDE 6.25 MG: 25 INJECTION INTRAMUSCULAR; INTRAVENOUS at 12:07

## 2019-01-01 RX ADMIN — SIMETHICONE 125 MG: 125 TABLET, CHEWABLE ORAL at 08:02

## 2019-01-01 RX ADMIN — SEVELAMER CARBONATE 800 MG: 800 TABLET, FILM COATED ORAL at 06:07

## 2019-01-01 RX ADMIN — HEPARIN SODIUM 2000 UNITS: 1000 INJECTION, SOLUTION INTRAVENOUS; SUBCUTANEOUS at 12:03

## 2019-01-01 RX ADMIN — CALCITRIOL 0.5 MCG: 0.25 CAPSULE ORAL at 08:04

## 2019-01-01 RX ADMIN — OXYCODONE HYDROCHLORIDE 15 MG: 10 TABLET ORAL at 09:07

## 2019-01-01 RX ADMIN — OXYCODONE HYDROCHLORIDE 15 MG: 10 TABLET ORAL at 05:07

## 2019-01-01 RX ADMIN — CALCITRIOL 0.5 MCG: 0.25 CAPSULE ORAL at 07:03

## 2019-01-01 RX ADMIN — LOPERAMIDE HYDROCHLORIDE 2 MG: 2 CAPSULE ORAL at 06:03

## 2019-01-01 RX ADMIN — LIDOCAINE HYDROCHLORIDE 100 MG: 20 INJECTION, SOLUTION INTRAVENOUS at 12:03

## 2019-01-01 RX ADMIN — CEFEPIME 1 G: 1 INJECTION, POWDER, FOR SOLUTION INTRAMUSCULAR; INTRAVENOUS at 01:03

## 2019-01-01 RX ADMIN — POTASSIUM CHLORIDE 20 MEQ: 20 SOLUTION ORAL at 03:02

## 2019-01-01 RX ADMIN — IPRATROPIUM BROMIDE AND ALBUTEROL SULFATE 3 ML: .5; 3 SOLUTION RESPIRATORY (INHALATION) at 12:04

## 2019-01-01 RX ADMIN — CINACALCET HYDROCHLORIDE 90 MG: 30 TABLET, COATED ORAL at 03:05

## 2019-01-01 RX ADMIN — PROCHLORPERAZINE EDISYLATE 10 MG: 5 INJECTION INTRAMUSCULAR; INTRAVENOUS at 05:03

## 2019-01-01 RX ADMIN — ATORVASTATIN CALCIUM 40 MG: 20 TABLET, FILM COATED ORAL at 10:04

## 2019-01-01 RX ADMIN — MORPHINE SULFATE 1 MG: 4 INJECTION INTRAVENOUS at 12:03

## 2019-01-01 RX ADMIN — INSULIN ASPART 2 UNITS: 100 INJECTION, SOLUTION INTRAVENOUS; SUBCUTANEOUS at 12:04

## 2019-01-01 RX ADMIN — POTASSIUM CHLORIDE 20 MEQ: 20 SOLUTION ORAL at 09:02

## 2019-01-01 RX ADMIN — OXYCODONE HYDROCHLORIDE 10 MG: 10 TABLET ORAL at 03:07

## 2019-01-01 RX ADMIN — HYDROMORPHONE HYDROCHLORIDE 1 MG: 2 INJECTION INTRAMUSCULAR; INTRAVENOUS; SUBCUTANEOUS at 03:03

## 2019-01-01 RX ADMIN — PROPOFOL 20 MG: 10 INJECTION, EMULSION INTRAVENOUS at 03:03

## 2019-01-01 RX ADMIN — SODIUM CHLORIDE, SODIUM LACTATE, POTASSIUM CHLORIDE, AND CALCIUM CHLORIDE: .6; .31; .03; .02 INJECTION, SOLUTION INTRAVENOUS at 08:03

## 2019-01-01 RX ADMIN — MINERAL OIL, PETROLATUM, PHENYLEPHRINE HCL 1 APPLICATOR: 2.5; 140; 749 OINTMENT TOPICAL at 12:05

## 2019-01-01 RX ADMIN — SEVELAMER CARBONATE 800 MG: 800 TABLET, FILM COATED ORAL at 04:04

## 2019-01-01 RX ADMIN — HEPARIN SODIUM 2000 UNITS: 1000 INJECTION, SOLUTION INTRAVENOUS; SUBCUTANEOUS at 06:04

## 2019-01-01 RX ADMIN — POLYETHYLENE GLYCOL 3350 17 G: 17 POWDER, FOR SOLUTION ORAL at 09:07

## 2019-01-01 RX ADMIN — SODIUM CHLORIDE: 0.9 INJECTION, SOLUTION INTRAVENOUS at 08:07

## 2019-01-01 RX ADMIN — POTASSIUM CHLORIDE 40 MEQ: 20 SOLUTION ORAL at 10:03

## 2019-01-01 RX ADMIN — GABAPENTIN 300 MG: 300 CAPSULE ORAL at 03:05

## 2019-01-01 RX ADMIN — GABAPENTIN 100 MG: 100 CAPSULE ORAL at 08:03

## 2019-01-01 RX ADMIN — VASOPRESSIN 2 UNITS: 20 INJECTION, SOLUTION INTRAMUSCULAR; SUBCUTANEOUS at 02:03

## 2019-01-01 RX ADMIN — IPRATROPIUM BROMIDE AND ALBUTEROL SULFATE 3 ML: .5; 3 SOLUTION RESPIRATORY (INHALATION) at 08:04

## 2019-01-01 RX ADMIN — SODIUM CHLORIDE, SODIUM LACTATE, CALCIUM CHLORIDE, MAGNESIUM CHLORIDE AND DEXTROSE: 1.5; 538; 448; 18.3; 5.08 INJECTION, SOLUTION INTRAPERITONEAL at 02:02

## 2019-01-01 RX ADMIN — PANTOPRAZOLE SODIUM 40 MG: 40 INJECTION, POWDER, LYOPHILIZED, FOR SOLUTION INTRAVENOUS at 10:03

## 2019-01-01 RX ADMIN — CARVEDILOL 3.12 MG: 3.12 TABLET, FILM COATED ORAL at 09:03

## 2019-01-01 RX ADMIN — CALCITRIOL 0.5 MCG: 0.25 CAPSULE ORAL at 09:06

## 2019-01-01 RX ADMIN — INSULIN ASPART 2 UNITS: 100 INJECTION, SOLUTION INTRAVENOUS; SUBCUTANEOUS at 04:04

## 2019-01-01 RX ADMIN — MIDODRINE HYDROCHLORIDE 10 MG: 5 TABLET ORAL at 11:05

## 2019-01-01 RX ADMIN — NEPHROCAP 1 CAPSULE: 1 CAP ORAL at 09:07

## 2019-01-01 RX ADMIN — GUAIFENESIN AND DEXTROMETHORPHAN HYDROBROMIDE 2 TABLET: 600; 30 TABLET, EXTENDED RELEASE ORAL at 09:05

## 2019-01-01 RX ADMIN — OXACILLIN: 1 INJECTION, POWDER, FOR SOLUTION INTRAMUSCULAR; INTRAVENOUS at 01:03

## 2019-01-01 RX ADMIN — PROPOFOL 50 MCG/KG/MIN: 10 INJECTION, EMULSION INTRAVENOUS at 11:07

## 2019-01-01 RX ADMIN — LISINOPRIL 2.5 MG: 2.5 TABLET ORAL at 09:04

## 2019-01-01 RX ADMIN — SEVELAMER CARBONATE 800 MG: 800 TABLET, FILM COATED ORAL at 12:03

## 2019-01-01 RX ADMIN — VASOPRESSIN 0.08 UNITS/MIN: 20 INJECTION INTRAVENOUS at 10:07

## 2019-01-01 RX ADMIN — LORAZEPAM 1 MG: 2 INJECTION, SOLUTION INTRAMUSCULAR; INTRAVENOUS at 09:07

## 2019-01-01 RX ADMIN — SENNOSIDES 8.6 MG: 8.6 TABLET, FILM COATED ORAL at 08:07

## 2019-01-01 RX ADMIN — OXYCODONE HYDROCHLORIDE 15 MG: 10 TABLET ORAL at 03:07

## 2019-01-01 RX ADMIN — HEPARIN SODIUM AND DEXTROSE 14 UNITS/KG/HR: 10000; 5 INJECTION INTRAVENOUS at 06:04

## 2019-01-01 RX ADMIN — Medication 1 CAPSULE: at 09:04

## 2019-01-01 RX ADMIN — CLOPIDOGREL 75 MG: 75 TABLET, FILM COATED ORAL at 09:06

## 2019-01-01 RX ADMIN — MIDODRINE HYDROCHLORIDE 10 MG: 2.5 TABLET ORAL at 04:07

## 2019-01-01 RX ADMIN — EPINEPHRINE 0.7 MCG/KG/MIN: 1 INJECTION PARENTERAL at 12:07

## 2019-01-01 RX ADMIN — INSULIN ASPART 2 UNITS: 100 INJECTION, SOLUTION INTRAVENOUS; SUBCUTANEOUS at 11:03

## 2019-01-01 RX ADMIN — Medication 400 MG: at 03:05

## 2019-01-01 RX ADMIN — HYDROMORPHONE HYDROCHLORIDE 1 MG: 2 INJECTION, SOLUTION INTRAMUSCULAR; INTRAVENOUS; SUBCUTANEOUS at 09:02

## 2019-01-01 RX ADMIN — INSULIN DETEMIR 10 UNITS: 100 INJECTION, SOLUTION SUBCUTANEOUS at 08:04

## 2019-01-01 RX ADMIN — SODIUM CHLORIDE 500 ML: 0.9 INJECTION, SOLUTION INTRAVENOUS at 05:02

## 2019-01-01 RX ADMIN — SEVELAMER CARBONATE 800 MG: 800 TABLET, FILM COATED ORAL at 06:05

## 2019-01-01 RX ADMIN — CEFEPIME 1 G: 1 INJECTION, POWDER, FOR SOLUTION INTRAMUSCULAR; INTRAVENOUS at 03:03

## 2019-01-01 RX ADMIN — CLOPIDOGREL 75 MG: 75 TABLET, FILM COATED ORAL at 09:04

## 2019-01-01 RX ADMIN — NOREPINEPHRINE BITARTRATE 0.02 MCG/KG/MIN: 1 INJECTION, SOLUTION, CONCENTRATE INTRAVENOUS at 09:07

## 2019-01-01 RX ADMIN — GUAIFENESIN 600 MG: 600 TABLET, EXTENDED RELEASE ORAL at 09:05

## 2019-01-01 RX ADMIN — MIDODRINE HYDROCHLORIDE 10 MG: 5 TABLET ORAL at 09:05

## 2019-01-01 RX ADMIN — HYDROMORPHONE HYDROCHLORIDE 1 MG: 2 INJECTION INTRAMUSCULAR; INTRAVENOUS; SUBCUTANEOUS at 01:02

## 2019-01-01 RX ADMIN — CINACALCET HYDROCHLORIDE 90 MG: 30 TABLET, COATED ORAL at 09:05

## 2019-01-01 RX ADMIN — CEFEPIME HYDROCHLORIDE 1 G: 1 INJECTION, SOLUTION INTRAVENOUS at 02:03

## 2019-01-01 RX ADMIN — OXYCODONE HYDROCHLORIDE AND ACETAMINOPHEN 1 TABLET: 10; 325 TABLET ORAL at 09:03

## 2019-01-01 RX ADMIN — CINACALCET HYDROCHLORIDE 90 MG: 30 TABLET, COATED ORAL at 10:04

## 2019-01-01 RX ADMIN — CARVEDILOL 3.12 MG: 3.12 TABLET, FILM COATED ORAL at 09:02

## 2019-01-01 RX ADMIN — LORAZEPAM 1 MG: 2 INJECTION INTRAMUSCULAR; INTRAVENOUS at 09:03

## 2019-01-01 RX ADMIN — EPOETIN ALFA-EPBX 5800 UNITS: 10000 INJECTION, SOLUTION INTRAVENOUS; SUBCUTANEOUS at 09:07

## 2019-01-01 RX ADMIN — ACETAMINOPHEN 650 MG: 325 TABLET ORAL at 04:05

## 2019-01-01 RX ADMIN — ONDANSETRON 4 MG: 2 INJECTION INTRAMUSCULAR; INTRAVENOUS at 04:07

## 2019-01-01 RX ADMIN — HYDROMORPHONE HYDROCHLORIDE 1 MG: 2 INJECTION, SOLUTION INTRAMUSCULAR; INTRAVENOUS; SUBCUTANEOUS at 04:02

## 2019-01-01 RX ADMIN — Medication 1 CAPSULE: at 09:06

## 2019-01-01 RX ADMIN — VANCOMYCIN HYDROCHLORIDE 750 MG: 750 INJECTION, POWDER, LYOPHILIZED, FOR SOLUTION INTRAVENOUS at 09:03

## 2019-01-01 RX ADMIN — SODIUM CHLORIDE, SODIUM LACTATE, CALCIUM CHLORIDE, MAGNESIUM CHLORIDE AND DEXTROSE: 2.5; 538; 448; 18.3; 5.08 INJECTION, SOLUTION INTRAPERITONEAL at 10:03

## 2019-01-01 RX ADMIN — RAMELTEON 8 MG: 8 TABLET, FILM COATED ORAL at 08:03

## 2019-01-01 RX ADMIN — HYDROMORPHONE HYDROCHLORIDE 1 MG: 2 INJECTION INTRAMUSCULAR; INTRAVENOUS; SUBCUTANEOUS at 09:02

## 2019-01-01 RX ADMIN — FENTANYL CITRATE 50 MCG: 50 INJECTION, SOLUTION INTRAMUSCULAR; INTRAVENOUS at 03:07

## 2019-01-01 RX ADMIN — CINACALCET HYDROCHLORIDE 90 MG: 30 TABLET, COATED ORAL at 09:04

## 2019-01-01 RX ADMIN — ERYTHROPOIETIN 20000 UNITS: 20000 INJECTION, SOLUTION INTRAVENOUS; SUBCUTANEOUS at 12:03

## 2019-01-01 RX ADMIN — FAMOTIDINE 20 MG: 20 TABLET, FILM COATED ORAL at 09:06

## 2019-01-01 RX ADMIN — ACETAMINOPHEN 650 MG: 325 TABLET ORAL at 04:07

## 2019-01-01 RX ADMIN — GABAPENTIN 100 MG: 100 CAPSULE ORAL at 04:04

## 2019-01-01 RX ADMIN — MIDODRINE HYDROCHLORIDE 10 MG: 5 TABLET ORAL at 11:07

## 2019-01-01 RX ADMIN — INSULIN ASPART 1 UNITS: 100 INJECTION, SOLUTION INTRAVENOUS; SUBCUTANEOUS at 10:02

## 2019-01-01 RX ADMIN — OXACILLIN: 1 INJECTION, POWDER, FOR SOLUTION INTRAMUSCULAR; INTRAVENOUS at 11:03

## 2019-01-01 RX ADMIN — NOREPINEPHRINE BITARTRATE 1.6 MCG/KG/MIN: 1 INJECTION, SOLUTION, CONCENTRATE INTRAVENOUS at 08:07

## 2019-01-01 RX ADMIN — SODIUM CHLORIDE: 0.9 INJECTION, SOLUTION INTRAVENOUS at 08:04

## 2019-01-01 RX ADMIN — CLOPIDOGREL 75 MG: 75 TABLET, FILM COATED ORAL at 03:05

## 2019-01-01 RX ADMIN — SEVELAMER CARBONATE 800 MG: 800 TABLET, FILM COATED ORAL at 07:04

## 2019-01-01 RX ADMIN — INSULIN ASPART 1 UNITS: 100 INJECTION, SOLUTION INTRAVENOUS; SUBCUTANEOUS at 10:07

## 2019-01-01 RX ADMIN — INSULIN ASPART 2 UNITS: 100 INJECTION, SOLUTION INTRAVENOUS; SUBCUTANEOUS at 09:03

## 2019-01-01 RX ADMIN — TOPICAL ANESTHETIC 1 EACH: 200 SPRAY DENTAL; PERIODONTAL at 03:03

## 2019-01-01 RX ADMIN — CARVEDILOL 3.12 MG: 3.12 TABLET, FILM COATED ORAL at 08:02

## 2019-01-01 RX ADMIN — EPOETIN ALFA-EPBX 5800 UNITS: 10000 INJECTION, SOLUTION INTRAVENOUS; SUBCUTANEOUS at 01:07

## 2019-01-01 RX ADMIN — GUAIFENESIN 600 MG: 600 TABLET, EXTENDED RELEASE ORAL at 08:05

## 2019-01-01 RX ADMIN — OXACILLIN SODIUM: 2 INJECTION, POWDER, FOR SOLUTION INTRAMUSCULAR; INTRAVENOUS at 03:03

## 2019-01-01 RX ADMIN — CETIRIZINE HYDROCHLORIDE 5 MG: 5 TABLET ORAL at 04:05

## 2019-01-01 RX ADMIN — GABAPENTIN 300 MG: 300 CAPSULE ORAL at 08:06

## 2019-01-01 RX ADMIN — HEPARIN SODIUM AND DEXTROSE 12 UNITS/KG/HR: 10000; 5 INJECTION INTRAVENOUS at 11:04

## 2019-01-01 RX ADMIN — GABAPENTIN 100 MG: 100 CAPSULE ORAL at 10:04

## 2019-01-01 RX ADMIN — HYDROMORPHONE HYDROCHLORIDE 1 MG: 2 INJECTION INTRAMUSCULAR; INTRAVENOUS; SUBCUTANEOUS at 11:03

## 2019-01-01 RX ADMIN — FENTANYL CITRATE 25 MCG: 50 INJECTION, SOLUTION INTRAMUSCULAR; INTRAVENOUS at 01:07

## 2019-01-01 RX ADMIN — SODIUM CHLORIDE 500 ML: 0.9 INJECTION, SOLUTION INTRAVENOUS at 09:07

## 2019-01-01 RX ADMIN — LOPERAMIDE HYDROCHLORIDE 2 MG: 2 CAPSULE ORAL at 10:04

## 2019-01-01 RX ADMIN — SODIUM BICARBONATE 100 MEQ: 84 INJECTION, SOLUTION INTRAVENOUS at 03:07

## 2019-01-01 RX ADMIN — INSULIN ASPART 3 UNITS: 100 INJECTION, SOLUTION INTRAVENOUS; SUBCUTANEOUS at 01:02

## 2019-01-01 RX ADMIN — GABAPENTIN 100 MG: 100 CAPSULE ORAL at 02:02

## 2019-01-01 RX ADMIN — PIPERACILLIN AND TAZOBACTAM 4.5 G: 4; .5 INJECTION, POWDER, LYOPHILIZED, FOR SOLUTION INTRAVENOUS; PARENTERAL at 04:04

## 2019-01-01 RX ADMIN — SEVELAMER CARBONATE 800 MG: 800 TABLET, FILM COATED ORAL at 07:05

## 2019-01-01 RX ADMIN — CINACALCET HYDROCHLORIDE 90 MG: 30 TABLET, COATED ORAL at 09:02

## 2019-01-01 RX ADMIN — ONDANSETRON 4 MG: 2 INJECTION INTRAMUSCULAR; INTRAVENOUS at 01:03

## 2019-01-01 RX ADMIN — MIDODRINE HYDROCHLORIDE 10 MG: 5 TABLET ORAL at 08:07

## 2019-01-01 RX ADMIN — HEPARIN SODIUM 500 UNITS: 1000 INJECTION, SOLUTION INTRAVENOUS; SUBCUTANEOUS at 11:03

## 2019-01-01 RX ADMIN — SULFAMETHOXAZOLE AND TRIMETHOPRIM 1000 MG: 80; 16 INJECTION, SOLUTION, CONCENTRATE INTRAVENOUS at 02:03

## 2019-01-01 RX ADMIN — IOHEXOL 15 ML: 350 INJECTION, SOLUTION INTRAVENOUS at 07:07

## 2019-01-01 RX ADMIN — SEVELAMER CARBONATE 800 MG: 800 TABLET, FILM COATED ORAL at 09:05

## 2019-01-01 RX ADMIN — SODIUM BICARBONATE 650 MG TABLET 1300 MG: at 10:03

## 2019-01-01 RX ADMIN — Medication 0.35 MCG/KG/MIN: at 03:07

## 2019-01-01 RX ADMIN — ESMOLOL HYDROCHLORIDE 10 MG: 10 INJECTION INTRAVENOUS at 03:07

## 2019-01-01 RX ADMIN — HYDROMORPHONE HYDROCHLORIDE 1 MG: 2 INJECTION INTRAMUSCULAR; INTRAVENOUS; SUBCUTANEOUS at 03:02

## 2019-01-01 RX ADMIN — PIPERACILLIN AND TAZOBACTAM 4.5 G: 4; .5 INJECTION, POWDER, LYOPHILIZED, FOR SOLUTION INTRAVENOUS; PARENTERAL at 11:03

## 2019-01-01 RX ADMIN — ACETAMINOPHEN 650 MG: 325 TABLET ORAL at 12:07

## 2019-01-01 RX ADMIN — CEFEPIME 1 G: 1 INJECTION, POWDER, FOR SOLUTION INTRAMUSCULAR; INTRAVENOUS at 04:03

## 2019-01-01 RX ADMIN — MAGNESIUM SULFATE IN WATER 2 G: 40 INJECTION, SOLUTION INTRAVENOUS at 08:04

## 2019-01-01 RX ADMIN — LACTOBACILLUS TAB 4 TABLET: TAB at 11:03

## 2019-01-01 RX ADMIN — NEPHROCAP 1 CAPSULE: 1 CAP ORAL at 01:07

## 2019-01-01 RX ADMIN — CINACALCET HYDROCHLORIDE 60 MG: 30 TABLET, FILM COATED ORAL at 03:05

## 2019-01-01 RX ADMIN — LOPERAMIDE HYDROCHLORIDE 2 MG: 2 CAPSULE ORAL at 04:03

## 2019-01-01 RX ADMIN — CINACALCET HYDROCHLORIDE 90 MG: 30 TABLET, COATED ORAL at 09:03

## 2019-01-01 RX ADMIN — LIDOCAINE HYDROCHLORIDE: 10 INJECTION, SOLUTION EPIDURAL; INFILTRATION; INTRACAUDAL at 09:04

## 2019-01-01 RX ADMIN — CINACALCET HYDROCHLORIDE 90 MG: 30 TABLET, FILM COATED ORAL at 08:05

## 2019-01-01 RX ADMIN — MAGNESIUM SULFATE 1 G: 1 INJECTION INTRAVENOUS at 01:03

## 2019-01-01 RX ADMIN — VASOPRESSIN 0.08 UNITS/MIN: 20 INJECTION INTRAVENOUS at 02:07

## 2019-01-01 RX ADMIN — INSULIN ASPART 2 UNITS: 100 INJECTION, SOLUTION INTRAVENOUS; SUBCUTANEOUS at 12:05

## 2019-01-01 RX ADMIN — MAGNESIUM SULFATE IN WATER 2 G: 40 INJECTION, SOLUTION INTRAVENOUS at 01:04

## 2019-01-01 RX ADMIN — IPRATROPIUM BROMIDE AND ALBUTEROL SULFATE 3 ML: .5; 3 SOLUTION RESPIRATORY (INHALATION) at 04:04

## 2019-01-01 RX ADMIN — INSULIN ASPART 1 UNITS: 100 INJECTION, SOLUTION INTRAVENOUS; SUBCUTANEOUS at 08:03

## 2019-01-01 RX ADMIN — LOPERAMIDE HYDROCHLORIDE 2 MG: 2 CAPSULE ORAL at 10:05

## 2019-01-01 RX ADMIN — POTASSIUM CHLORIDE 20 MEQ: 20 SOLUTION ORAL at 08:02

## 2019-01-01 RX ADMIN — MIDODRINE HYDROCHLORIDE 15 MG: 5 TABLET ORAL at 07:07

## 2019-01-01 RX ADMIN — BENZONATATE 100 MG: 100 CAPSULE ORAL at 05:04

## 2019-01-01 RX ADMIN — ASPIRIN 81 MG: 81 TABLET, COATED ORAL at 09:03

## 2019-01-01 RX ADMIN — CEFAZOLIN 2 G: 330 INJECTION, POWDER, FOR SOLUTION INTRAMUSCULAR; INTRAVENOUS at 02:07

## 2019-01-01 RX ADMIN — KETAMINE HYDROCHLORIDE 10 MG: 100 INJECTION, SOLUTION, CONCENTRATE INTRAMUSCULAR; INTRAVENOUS at 03:07

## 2019-01-01 RX ADMIN — POLYETHYLENE GLYCOL 3350 17 G: 17 POWDER, FOR SOLUTION ORAL at 12:07

## 2019-01-01 RX ADMIN — SODIUM CHLORIDE: 0.9 INJECTION, SOLUTION INTRAVENOUS at 08:03

## 2019-01-01 RX ADMIN — CLOPIDOGREL 75 MG: 75 TABLET, FILM COATED ORAL at 06:03

## 2019-01-01 RX ADMIN — MIDODRINE HYDROCHLORIDE 10 MG: 5 TABLET ORAL at 09:06

## 2019-01-01 RX ADMIN — MIDODRINE HYDROCHLORIDE 10 MG: 5 TABLET ORAL at 12:07

## 2019-01-01 RX ADMIN — ERYTHROPOIETIN 9300 UNITS: 10000 INJECTION, SOLUTION INTRAVENOUS; SUBCUTANEOUS at 10:04

## 2019-01-01 RX ADMIN — NOREPINEPHRINE BITARTRATE 1.1 MCG/KG/MIN: 1 INJECTION, SOLUTION, CONCENTRATE INTRAVENOUS at 01:07

## 2019-01-01 RX ADMIN — PROPOFOL 35 MCG/KG/MIN: 10 INJECTION, EMULSION INTRAVENOUS at 04:07

## 2019-01-01 RX ADMIN — EPINEPHRINE 0.8 MCG/KG/MIN: 1 INJECTION PARENTERAL at 04:07

## 2019-01-01 RX ADMIN — SODIUM BICARBONATE 50 MEQ: 84 INJECTION, SOLUTION INTRAVENOUS at 01:07

## 2019-01-01 RX ADMIN — HYDROMORPHONE HYDROCHLORIDE 1 MG: 2 INJECTION INTRAMUSCULAR; INTRAVENOUS; SUBCUTANEOUS at 08:03

## 2019-01-01 RX ADMIN — SODIUM CHLORIDE, SODIUM LACTATE, POTASSIUM CHLORIDE, AND CALCIUM CHLORIDE: .6; .31; .03; .02 INJECTION, SOLUTION INTRAVENOUS at 08:02

## 2019-01-01 RX ADMIN — LOPERAMIDE HYDROCHLORIDE 4 MG: 2 CAPSULE ORAL at 03:03

## 2019-01-01 RX ADMIN — POTASSIUM CHLORIDE 20 MEQ: 20 SOLUTION ORAL at 04:03

## 2019-01-01 RX ADMIN — SODIUM CHLORIDE 250 ML: 0.9 INJECTION, SOLUTION INTRAVENOUS at 06:03

## 2019-01-01 RX ADMIN — POTASSIUM CHLORIDE 20 MEQ: 20 TABLET, EXTENDED RELEASE ORAL at 10:03

## 2019-01-01 RX ADMIN — DEXTROSE: 10 SOLUTION INTRAVENOUS at 08:07

## 2019-01-01 RX ADMIN — POTASSIUM CHLORIDE 20 MEQ: 20 TABLET, EXTENDED RELEASE ORAL at 06:03

## 2019-01-01 RX ADMIN — IRBESARTAN 75 MG: 75 TABLET ORAL at 08:04

## 2019-01-01 RX ADMIN — SEVELAMER CARBONATE 800 MG: 800 TABLET, FILM COATED ORAL at 11:06

## 2019-01-01 RX ADMIN — METOPROLOL SUCCINATE 25 MG: 25 TABLET, EXTENDED RELEASE ORAL at 09:04

## 2019-01-01 RX ADMIN — LORAZEPAM: 2 INJECTION INTRAMUSCULAR; INTRAVENOUS at 06:03

## 2019-01-01 RX ADMIN — MIDODRINE HYDROCHLORIDE 15 MG: 5 TABLET ORAL at 05:07

## 2019-01-01 RX ADMIN — OXYCODONE HYDROCHLORIDE 15 MG: 10 TABLET ORAL at 06:07

## 2019-01-01 RX ADMIN — EPINEPHRINE 0.02 MCG/KG/MIN: 1 INJECTION PARENTERAL at 03:07

## 2019-01-01 RX ADMIN — SEVELAMER CARBONATE 800 MG: 800 TABLET, FILM COATED ORAL at 09:06

## 2019-01-01 RX ADMIN — Medication 0.85 MCG/KG/MIN: at 09:07

## 2019-01-01 RX ADMIN — LOPERAMIDE HYDROCHLORIDE 2 MG: 2 CAPSULE ORAL at 11:05

## 2019-01-01 RX ADMIN — ASPIRIN 81 MG: 81 TABLET, COATED ORAL at 08:02

## 2019-01-01 RX ADMIN — MINERAL OIL, PETROLATUM, PHENYLEPHRINE HCL 1 APPLICATOR: 2.5; 140; 749 OINTMENT TOPICAL at 10:05

## 2019-01-01 RX ADMIN — LOPERAMIDE HYDROCHLORIDE 2 MG: 2 CAPSULE ORAL at 09:04

## 2019-01-01 RX ADMIN — GUAIFENESIN 600 MG: 600 TABLET, EXTENDED RELEASE ORAL at 04:05

## 2019-01-01 RX ADMIN — SODIUM CHLORIDE, SODIUM LACTATE, POTASSIUM CHLORIDE, AND CALCIUM CHLORIDE: .6; .31; .03; .02 INJECTION, SOLUTION INTRAVENOUS at 12:02

## 2019-01-01 RX ADMIN — ROPIVACAINE HYDROCHLORIDE 6 ML/HR: 2 INJECTION, SOLUTION EPIDURAL; INFILTRATION at 06:07

## 2019-01-01 RX ADMIN — HYDROMORPHONE HYDROCHLORIDE 1 MG: 1 INJECTION, SOLUTION INTRAMUSCULAR; INTRAVENOUS; SUBCUTANEOUS at 05:02

## 2019-01-01 RX ADMIN — IPRATROPIUM BROMIDE AND ALBUTEROL SULFATE 3 ML: .5; 3 SOLUTION RESPIRATORY (INHALATION) at 02:04

## 2019-01-01 RX ADMIN — PHENYLEPHRINE HYDROCHLORIDE 100 MCG: 10 INJECTION INTRAVENOUS at 02:07

## 2019-01-01 RX ADMIN — FLUCONAZOLE 200 MG: 100 TABLET ORAL at 10:03

## 2019-01-01 RX ADMIN — MIDODRINE HYDROCHLORIDE 15 MG: 5 TABLET ORAL at 10:07

## 2019-01-01 RX ADMIN — ETOMIDATE 8 MG: 2 INJECTION, SOLUTION INTRAVENOUS at 03:03

## 2019-01-01 RX ADMIN — PROPOFOL 20 MG: 10 INJECTION, EMULSION INTRAVENOUS at 11:03

## 2019-01-01 RX ADMIN — PHENYLEPHRINE HYDROCHLORIDE 100 MCG: 10 INJECTION INTRAVENOUS at 11:03

## 2019-01-01 RX ADMIN — SEVELAMER CARBONATE 800 MG: 800 TABLET, FILM COATED ORAL at 04:03

## 2019-01-01 RX ADMIN — SODIUM CHLORIDE: 0.9 INJECTION, SOLUTION INTRAVENOUS at 03:07

## 2019-01-01 RX ADMIN — NEPHROCAP 1 CAPSULE: 1 CAP ORAL at 03:05

## 2019-01-01 RX ADMIN — CLOPIDOGREL BISULFATE 75 MG: 75 TABLET, FILM COATED ORAL at 09:03

## 2019-01-01 RX ADMIN — SENNOSIDES,DOCUSATE SODIUM 1 TABLET: 8.6; 5 TABLET, FILM COATED ORAL at 08:07

## 2019-01-01 RX ADMIN — SULFAMETHOXAZOLE AND TRIMETHOPRIM 500 MG: 80; 16 INJECTION, SOLUTION, CONCENTRATE INTRAVENOUS at 04:03

## 2019-01-01 RX ADMIN — ASPIRIN 81 MG: 81 TABLET, COATED ORAL at 04:05

## 2019-01-01 RX ADMIN — HEPARIN SODIUM 2000 UNITS: 1000 INJECTION, SOLUTION INTRAVENOUS; SUBCUTANEOUS at 09:03

## 2019-01-01 RX ADMIN — FENTANYL CITRATE 100 MCG: 50 INJECTION, SOLUTION INTRAMUSCULAR; INTRAVENOUS at 03:07

## 2019-01-01 RX ADMIN — SODIUM CHLORIDE 500 ML: 0.9 INJECTION, SOLUTION INTRAVENOUS at 04:04

## 2019-01-01 RX ADMIN — CINACALCET HYDROCHLORIDE 90 MG: 30 TABLET, COATED ORAL at 11:03

## 2019-01-01 RX ADMIN — ACETAMINOPHEN 1000 MG: 10 INJECTION, SOLUTION INTRAVENOUS at 02:07

## 2019-01-01 RX ADMIN — SEVELAMER CARBONATE 800 MG: 800 TABLET, FILM COATED ORAL at 02:04

## 2019-01-01 RX ADMIN — FERROUS SULFATE TAB EC 325 MG (65 MG FE EQUIVALENT) 325 MG: 325 (65 FE) TABLET DELAYED RESPONSE at 08:05

## 2019-01-01 RX ADMIN — MIDODRINE HYDROCHLORIDE 10 MG: 5 TABLET ORAL at 01:07

## 2019-01-01 RX ADMIN — HYDROMORPHONE HYDROCHLORIDE 1 MG: 2 INJECTION, SOLUTION INTRAMUSCULAR; INTRAVENOUS; SUBCUTANEOUS at 07:02

## 2019-01-01 RX ADMIN — MAGNESIUM OXIDE TAB 400 MG (241.3 MG ELEMENTAL MG) 400 MG: 400 (241.3 MG) TAB at 09:06

## 2019-01-01 RX ADMIN — CIPROFLOXACIN HYDROCHLORIDE 500 MG: 500 TABLET, FILM COATED ORAL at 08:05

## 2019-01-01 RX ADMIN — SODIUM BICARBONATE 650 MG TABLET 1300 MG: at 02:03

## 2019-01-01 RX ADMIN — INSULIN ASPART 2 UNITS: 100 INJECTION, SOLUTION INTRAVENOUS; SUBCUTANEOUS at 12:03

## 2019-01-01 RX ADMIN — FLUCONAZOLE 200 MG: 100 TABLET ORAL at 08:03

## 2019-01-01 RX ADMIN — FERROUS SULFATE TAB EC 325 MG (65 MG FE EQUIVALENT) 325 MG: 325 (65 FE) TABLET DELAYED RESPONSE at 09:06

## 2019-01-01 RX ADMIN — ERYTHROPOIETIN 20000 UNITS: 20000 INJECTION, SOLUTION INTRAVENOUS; SUBCUTANEOUS at 09:03

## 2019-01-01 RX ADMIN — VANCOMYCIN HYDROCHLORIDE 1250 MG: 1.25 INJECTION, POWDER, LYOPHILIZED, FOR SOLUTION INTRAVENOUS at 11:07

## 2019-01-01 RX ADMIN — SULFAMETHOXAZOLE AND TRIMETHOPRIM 500 MG: 80; 16 INJECTION, SOLUTION, CONCENTRATE INTRAVENOUS at 01:03

## 2019-01-01 RX ADMIN — LACTOBACILLUS TAB 4 TABLET: TAB at 03:03

## 2019-01-01 RX ADMIN — PANTOPRAZOLE SODIUM 40 MG: 40 TABLET, DELAYED RELEASE ORAL at 03:05

## 2019-01-01 RX ADMIN — HEPARIN SODIUM 1000 UNITS: 1000 INJECTION, SOLUTION INTRAVENOUS; SUBCUTANEOUS at 05:04

## 2019-01-01 RX ADMIN — CLOPIDOGREL BISULFATE 75 MG: 75 TABLET, FILM COATED ORAL at 03:04

## 2019-01-01 RX ADMIN — SODIUM CHLORIDE, SODIUM LACTATE, POTASSIUM CHLORIDE, AND CALCIUM CHLORIDE 500 ML: .6; .31; .03; .02 INJECTION, SOLUTION INTRAVENOUS at 06:07

## 2019-01-01 RX ADMIN — CLOPIDOGREL BISULFATE 75 MG: 75 TABLET, FILM COATED ORAL at 08:02

## 2019-01-01 RX ADMIN — SODIUM CHLORIDE: 9 INJECTION, SOLUTION INTRAVENOUS at 03:03

## 2019-01-01 RX ADMIN — INSULIN ASPART 1 UNITS: 100 INJECTION, SOLUTION INTRAVENOUS; SUBCUTANEOUS at 09:05

## 2019-01-01 RX ADMIN — OXYCODONE HYDROCHLORIDE 15 MG: 10 TABLET ORAL at 02:07

## 2019-01-01 RX ADMIN — VANCOMYCIN HYDROCHLORIDE 1000 MG: 1 INJECTION, POWDER, LYOPHILIZED, FOR SOLUTION INTRAVENOUS at 01:07

## 2019-01-01 RX ADMIN — Medication 0.35 MCG/KG/MIN: at 10:07

## 2019-01-01 RX ADMIN — INSULIN ASPART 2 UNITS: 100 INJECTION, SOLUTION INTRAVENOUS; SUBCUTANEOUS at 12:02

## 2019-01-01 RX ADMIN — MIDAZOLAM HYDROCHLORIDE 2 MG: 1 INJECTION, SOLUTION INTRAMUSCULAR; INTRAVENOUS at 11:03

## 2019-01-01 RX ADMIN — CEFTAZIDIME 1 G: 1 INJECTION, POWDER, FOR SOLUTION INTRAMUSCULAR; INTRAVENOUS at 06:03

## 2019-01-01 RX ADMIN — MIDODRINE HYDROCHLORIDE 10 MG: 5 TABLET ORAL at 03:05

## 2019-01-01 RX ADMIN — SODIUM CHLORIDE: 0.9 INJECTION, SOLUTION INTRAVENOUS at 01:04

## 2019-01-01 RX ADMIN — GABAPENTIN 300 MG: 300 CAPSULE ORAL at 09:04

## 2019-01-01 RX ADMIN — CLOPIDOGREL 75 MG: 75 TABLET, FILM COATED ORAL at 04:05

## 2019-01-01 RX ADMIN — IPRATROPIUM BROMIDE AND ALBUTEROL SULFATE 3 ML: .5; 3 SOLUTION RESPIRATORY (INHALATION) at 07:05

## 2019-01-01 RX ADMIN — HYDROMORPHONE HYDROCHLORIDE 1 MG: 2 INJECTION INTRAMUSCULAR; INTRAVENOUS; SUBCUTANEOUS at 12:02

## 2019-01-01 RX ADMIN — OXYCODONE HYDROCHLORIDE 15 MG: 10 TABLET ORAL at 07:07

## 2019-01-01 RX ADMIN — INSULIN ASPART 2 UNITS: 100 INJECTION, SOLUTION INTRAVENOUS; SUBCUTANEOUS at 09:05

## 2019-01-01 RX ADMIN — CALCITRIOL 0.5 MCG: 0.25 CAPSULE ORAL at 09:03

## 2019-01-01 RX ADMIN — NATEGLINIDE 120 MG: 60 TABLET, COATED ORAL at 05:04

## 2019-01-01 RX ADMIN — HEPARIN SODIUM 1000 UNITS: 1000 INJECTION, SOLUTION INTRAVENOUS; SUBCUTANEOUS at 02:03

## 2019-01-01 RX ADMIN — LOPERAMIDE HYDROCHLORIDE 2 MG: 2 CAPSULE ORAL at 10:03

## 2019-01-01 RX ADMIN — SIMETHICONE 125 MG: 125 TABLET, CHEWABLE ORAL at 12:02

## 2019-01-01 RX ADMIN — SODIUM CHLORIDE, SODIUM LACTATE, CALCIUM CHLORIDE, MAGNESIUM CHLORIDE AND DEXTROSE: 1.5; 538; 448; 18.3; 5.08 INJECTION, SOLUTION INTRAPERITONEAL at 05:02

## 2019-01-01 RX ADMIN — FERROUS SULFATE TAB EC 325 MG (65 MG FE EQUIVALENT) 325 MG: 325 (65 FE) TABLET DELAYED RESPONSE at 09:05

## 2019-01-01 RX ADMIN — HYDROMORPHONE HYDROCHLORIDE 1 MG: 2 INJECTION INTRAMUSCULAR; INTRAVENOUS; SUBCUTANEOUS at 08:02

## 2019-01-01 RX ADMIN — NATEGLINIDE 120 MG: 60 TABLET, COATED ORAL at 12:04

## 2019-01-01 RX ADMIN — Medication 1250 MG: at 05:04

## 2019-01-01 RX ADMIN — ALTEPLASE 6 MG: 2.2 INJECTION, POWDER, LYOPHILIZED, FOR SOLUTION INTRAVENOUS at 04:06

## 2019-01-01 RX ADMIN — SIMETHICONE 125 MG: 125 TABLET, CHEWABLE ORAL at 04:02

## 2019-01-01 RX ADMIN — LORAZEPAM 1 MG: 2 INJECTION INTRAMUSCULAR at 09:07

## 2019-01-01 RX ADMIN — ONDANSETRON 8 MG: 2 INJECTION INTRAMUSCULAR; INTRAVENOUS at 04:03

## 2019-01-01 RX ADMIN — SODIUM CHLORIDE 350 ML: 0.9 INJECTION, SOLUTION INTRAVENOUS at 10:07

## 2019-01-01 RX ADMIN — FERROUS SULFATE TAB EC 325 MG (65 MG FE EQUIVALENT) 325 MG: 325 (65 FE) TABLET DELAYED RESPONSE at 12:07

## 2019-01-01 RX ADMIN — NEPHROCAP 1 CAPSULE: 1 CAP ORAL at 03:03

## 2019-01-01 RX ADMIN — GABAPENTIN 100 MG: 100 CAPSULE ORAL at 03:04

## 2019-01-01 RX ADMIN — OXYCODONE HYDROCHLORIDE 15 MG: 10 TABLET ORAL at 12:07

## 2019-01-01 RX ADMIN — BENZONATATE 100 MG: 100 CAPSULE ORAL at 01:04

## 2019-01-01 RX ADMIN — SODIUM CHLORIDE: 0.9 INJECTION, SOLUTION INTRAVENOUS at 02:04

## 2019-01-01 RX ADMIN — DIPHENHYDRAMINE HYDROCHLORIDE 50 MG: 50 INJECTION, SOLUTION INTRAMUSCULAR; INTRAVENOUS at 12:03

## 2019-01-01 RX ADMIN — GABAPENTIN 100 MG: 100 CAPSULE ORAL at 08:02

## 2019-01-01 RX ADMIN — LOPERAMIDE HYDROCHLORIDE 2 MG: 2 CAPSULE ORAL at 09:05

## 2019-01-01 RX ADMIN — MAGNESIUM SULFATE IN WATER 2 G: 40 INJECTION, SOLUTION INTRAVENOUS at 02:03

## 2019-01-01 RX ADMIN — HEPARIN SODIUM 1000 UNITS: 1000 INJECTION, SOLUTION INTRAVENOUS; SUBCUTANEOUS at 11:07

## 2019-01-01 RX ADMIN — VANCOMYCIN HYDROCHLORIDE 500 MG: 500 INJECTION, POWDER, LYOPHILIZED, FOR SOLUTION INTRAVENOUS at 07:04

## 2019-01-01 RX ADMIN — PANTOPRAZOLE SODIUM 40 MG: 40 TABLET, DELAYED RELEASE ORAL at 12:03

## 2019-01-01 RX ADMIN — HYDROMORPHONE HYDROCHLORIDE 1 MG: 2 INJECTION INTRAMUSCULAR; INTRAVENOUS; SUBCUTANEOUS at 10:03

## 2019-01-01 RX ADMIN — ACETAMINOPHEN 650 MG: 325 TABLET ORAL at 02:04

## 2019-01-01 RX ADMIN — PROCHLORPERAZINE EDISYLATE 10 MG: 5 INJECTION INTRAMUSCULAR; INTRAVENOUS at 10:03

## 2019-01-01 RX ADMIN — POTASSIUM CHLORIDE 40 MEQ: 1500 TABLET, EXTENDED RELEASE ORAL at 08:07

## 2019-01-01 RX ADMIN — HEPARIN SODIUM 5000 UNITS: 5000 INJECTION, SOLUTION INTRAVENOUS; SUBCUTANEOUS at 05:06

## 2019-01-01 RX ADMIN — INSULIN ASPART 1 UNITS: 100 INJECTION, SOLUTION INTRAVENOUS; SUBCUTANEOUS at 09:03

## 2019-01-01 RX ADMIN — PROMETHAZINE HYDROCHLORIDE 12.5 MG: 25 INJECTION INTRAMUSCULAR; INTRAVENOUS at 06:07

## 2019-01-01 RX ADMIN — SODIUM CHLORIDE 50 ML: 0.9 INJECTION, SOLUTION INTRAVENOUS at 03:07

## 2019-01-01 RX ADMIN — ALTEPLASE 2 MG: 2.2 INJECTION, POWDER, LYOPHILIZED, FOR SOLUTION INTRAVENOUS at 06:04

## 2019-01-01 RX ADMIN — PROPOFOL 90 MCG/KG/MIN: 10 INJECTION, EMULSION INTRAVENOUS at 09:03

## 2019-01-01 RX ADMIN — PANTOPRAZOLE SODIUM 80 MG: 40 INJECTION, POWDER, FOR SOLUTION INTRAVENOUS at 10:03

## 2019-01-01 RX ADMIN — ALTEPLASE 6 MG: 2.2 INJECTION, POWDER, LYOPHILIZED, FOR SOLUTION INTRAVENOUS at 11:05

## 2019-01-01 RX ADMIN — ALTEPLASE 6 MG: 2.2 INJECTION, POWDER, LYOPHILIZED, FOR SOLUTION INTRAVENOUS at 04:05

## 2019-01-01 RX ADMIN — SEVELAMER CARBONATE 800 MG: 800 TABLET, FILM COATED ORAL at 11:07

## 2019-01-01 RX ADMIN — NEPHROCAP 1 CAPSULE: 1 CAP ORAL at 12:07

## 2019-01-01 RX ADMIN — OXYCODONE HYDROCHLORIDE 10 MG: 10 TABLET ORAL at 12:07

## 2019-01-01 RX ADMIN — EPINEPHRINE 0.7 MCG/KG/MIN: 1 INJECTION PARENTERAL at 11:07

## 2019-01-01 RX ADMIN — EPINEPHRINE 0.36 MCG/KG/MIN: 1 INJECTION PARENTERAL at 06:07

## 2019-01-01 RX ADMIN — CINACALCET HYDROCHLORIDE 90 MG: 30 TABLET, FILM COATED ORAL at 09:06

## 2019-01-01 RX ADMIN — MORPHINE SULFATE 6 MG: 2 INJECTION, SOLUTION INTRAMUSCULAR; INTRAVENOUS at 09:07

## 2019-01-01 RX ADMIN — PROPOFOL 100 MCG/KG/MIN: 10 INJECTION, EMULSION INTRAVENOUS at 11:03

## 2019-01-01 RX ADMIN — ETOMIDATE 20 MG: 2 INJECTION INTRAVENOUS at 10:07

## 2019-01-01 RX ADMIN — LOPERAMIDE HYDROCHLORIDE 2 MG: 2 CAPSULE ORAL at 01:04

## 2019-01-01 RX ADMIN — SUCCINYLCHOLINE CHLORIDE 100 MG: 20 INJECTION INTRAMUSCULAR; INTRAVENOUS at 10:07

## 2019-01-01 RX ADMIN — INSULIN ASPART 2 UNITS: 100 INJECTION, SOLUTION INTRAVENOUS; SUBCUTANEOUS at 09:02

## 2019-01-01 RX ADMIN — ONDANSETRON 4 MG: 2 INJECTION INTRAMUSCULAR; INTRAVENOUS at 03:03

## 2019-01-01 RX ADMIN — CARVEDILOL 12.5 MG: 12.5 TABLET, FILM COATED ORAL at 10:03

## 2019-01-01 RX ADMIN — ERYTHROPOIETIN 20000 UNITS: 20000 INJECTION, SOLUTION INTRAVENOUS; SUBCUTANEOUS at 01:03

## 2019-01-01 RX ADMIN — GUAIFENESIN AND DEXTROMETHORPHAN HYDROBROMIDE 2 TABLET: 600; 30 TABLET, EXTENDED RELEASE ORAL at 11:05

## 2019-01-01 RX ADMIN — ETOMIDATE 10 MG: 2 INJECTION, SOLUTION INTRAVENOUS at 02:07

## 2019-01-01 RX ADMIN — ETOMIDATE 28 MG: 2 INJECTION, SOLUTION INTRAVENOUS at 02:07

## 2019-01-01 RX ADMIN — POLYETHYLENE GLYCOL 3350 17 G: 17 POWDER, FOR SOLUTION ORAL at 08:07

## 2019-01-01 RX ADMIN — PROPOFOL 30 MG: 10 INJECTION, EMULSION INTRAVENOUS at 02:07

## 2019-01-01 RX ADMIN — CLOPIDOGREL BISULFATE 75 MG: 75 TABLET, FILM COATED ORAL at 09:04

## 2019-01-01 RX ADMIN — ALTEPLASE 6.4 MG: 2.2 INJECTION, POWDER, LYOPHILIZED, FOR SOLUTION INTRAVENOUS at 01:07

## 2019-01-01 RX ADMIN — PIPERACILLIN AND TAZOBACTAM 4.5 G: 4; .5 INJECTION, POWDER, LYOPHILIZED, FOR SOLUTION INTRAVENOUS; PARENTERAL at 07:04

## 2019-01-01 RX ADMIN — ACETAMINOPHEN 650 MG: 325 TABLET ORAL at 02:07

## 2019-01-01 RX ADMIN — SUCCINYLCHOLINE CHLORIDE 100 MG: 20 INJECTION, SOLUTION INTRAMUSCULAR; INTRAVENOUS at 10:07

## 2019-01-01 RX ADMIN — MIDODRINE HYDROCHLORIDE 15 MG: 5 TABLET ORAL at 08:07

## 2019-01-01 RX ADMIN — ACETAMINOPHEN 650 MG: 325 TABLET ORAL at 01:05

## 2019-01-01 RX ADMIN — Medication 400 MG: at 05:04

## 2019-01-01 RX ADMIN — HYDROMORPHONE HYDROCHLORIDE 0.2 MG: 1 INJECTION, SOLUTION INTRAMUSCULAR; INTRAVENOUS; SUBCUTANEOUS at 06:07

## 2019-01-01 RX ADMIN — HYDROMORPHONE HYDROCHLORIDE 1 MG: 2 INJECTION INTRAMUSCULAR; INTRAVENOUS; SUBCUTANEOUS at 05:02

## 2019-01-01 RX ADMIN — SEVELAMER CARBONATE 800 MG: 800 TABLET, FILM COATED ORAL at 09:07

## 2019-01-01 RX ADMIN — HEPARIN SODIUM 5000 UNITS: 5000 INJECTION, SOLUTION INTRAVENOUS; SUBCUTANEOUS at 06:04

## 2019-01-01 RX ADMIN — PANTOPRAZOLE SODIUM 40 MG: 40 INJECTION, POWDER, LYOPHILIZED, FOR SOLUTION INTRAVENOUS at 09:03

## 2019-01-01 RX ADMIN — ONDANSETRON 4 MG: 2 INJECTION INTRAMUSCULAR; INTRAVENOUS at 06:02

## 2019-01-01 RX ADMIN — FAMOTIDINE 20 MG: 10 INJECTION, SOLUTION INTRAVENOUS at 10:07

## 2019-01-01 RX ADMIN — EPOETIN ALFA-EPBX 5800 UNITS: 10000 INJECTION, SOLUTION INTRAVENOUS; SUBCUTANEOUS at 10:07

## 2019-01-01 RX ADMIN — FLUCONAZOLE, SODIUM CHLORIDE 200 MG: 2 INJECTION INTRAVENOUS at 11:03

## 2019-01-01 RX ADMIN — SIMETHICONE 125 MG: 125 TABLET, CHEWABLE ORAL at 03:02

## 2019-01-01 RX ADMIN — FERROUS SULFATE TAB EC 325 MG (65 MG FE EQUIVALENT) 325 MG: 325 (65 FE) TABLET DELAYED RESPONSE at 03:05

## 2019-01-01 RX ADMIN — PROPOFOL 35 MCG/KG/MIN: 10 INJECTION, EMULSION INTRAVENOUS at 12:07

## 2019-01-01 RX ADMIN — ERYTHROPOIETIN 20000 UNITS: 20000 INJECTION, SOLUTION INTRAVENOUS; SUBCUTANEOUS at 02:03

## 2019-01-01 RX ADMIN — MORPHINE SULFATE 2 MG: 2 INJECTION, SOLUTION INTRAMUSCULAR; INTRAVENOUS at 09:07

## 2019-01-01 RX ADMIN — SODIUM CHLORIDE, SODIUM LACTATE, POTASSIUM CHLORIDE, AND CALCIUM CHLORIDE 250 ML: .6; .31; .03; .02 INJECTION, SOLUTION INTRAVENOUS at 07:03

## 2019-01-01 RX ADMIN — HEPARIN SODIUM 1000 UNITS: 1000 INJECTION, SOLUTION INTRAVENOUS; SUBCUTANEOUS at 04:04

## 2019-01-01 RX ADMIN — Medication 0.01 MCG/KG/MIN: at 06:04

## 2019-01-01 RX ADMIN — HEPARIN SODIUM 2500 UNITS: 1000 INJECTION, SOLUTION INTRAVENOUS; SUBCUTANEOUS at 02:04

## 2019-01-01 RX ADMIN — PROMETHAZINE HYDROCHLORIDE 12.5 MG: 12.5 TABLET ORAL at 08:02

## 2019-01-01 RX ADMIN — HEPARIN SODIUM 1000 UNITS: 1000 INJECTION, SOLUTION INTRAVENOUS; SUBCUTANEOUS at 09:07

## 2019-01-01 RX ADMIN — NOREPINEPHRINE BITARTRATE 1.15 MCG/KG/MIN: 1 INJECTION, SOLUTION, CONCENTRATE INTRAVENOUS at 04:07

## 2019-01-01 RX ADMIN — SODIUM CHLORIDE: 0.9 INJECTION, SOLUTION INTRAVENOUS at 05:04

## 2019-01-01 RX ADMIN — HEPARIN SODIUM AND DEXTROSE 18 UNITS/KG/HR: 10000; 5 INJECTION INTRAVENOUS at 11:04

## 2019-01-01 RX ADMIN — Medication 0.6 MCG/KG/MIN: at 06:07

## 2019-01-01 RX ADMIN — LOPERAMIDE HYDROCHLORIDE 2 MG: 2 CAPSULE ORAL at 05:05

## 2019-01-01 RX ADMIN — SUGAMMADEX 200 MG: 100 INJECTION, SOLUTION INTRAVENOUS at 02:07

## 2019-01-01 RX ADMIN — HEPARIN SODIUM 1000 UNITS: 1000 INJECTION, SOLUTION INTRAVENOUS; SUBCUTANEOUS at 11:04

## 2019-01-01 RX ADMIN — NEPHROCAP 1 CAPSULE: 1 CAP ORAL at 09:03

## 2019-01-01 RX ADMIN — IRBESARTAN 75 MG: 75 TABLET ORAL at 09:04

## 2019-01-01 RX ADMIN — ALPRAZOLAM 0.25 MG: 0.25 TABLET ORAL at 02:03

## 2019-01-01 RX ADMIN — CLOPIDOGREL BISULFATE 75 MG: 75 TABLET ORAL at 08:04

## 2019-01-01 RX ADMIN — HYDROMORPHONE HYDROCHLORIDE 1 MG: 2 INJECTION INTRAMUSCULAR; INTRAVENOUS; SUBCUTANEOUS at 07:03

## 2019-01-01 RX ADMIN — INSULIN DETEMIR 10 UNITS: 100 INJECTION, SOLUTION SUBCUTANEOUS at 10:04

## 2019-01-01 RX ADMIN — MINERAL OIL, PETROLATUM, PHENYLEPHRINE HCL 1 APPLICATOR: 2.5; 140; 749 OINTMENT TOPICAL at 04:05

## 2019-01-01 RX ADMIN — POTASSIUM CHLORIDE 20 MEQ: 20 TABLET, EXTENDED RELEASE ORAL at 01:03

## 2019-01-01 RX ADMIN — INSULIN ASPART 2 UNITS: 100 INJECTION, SOLUTION INTRAVENOUS; SUBCUTANEOUS at 11:05

## 2019-01-01 RX ADMIN — MAGNESIUM SULFATE IN WATER 2 G: 40 INJECTION, SOLUTION INTRAVENOUS at 09:02

## 2019-01-01 RX ADMIN — HYDROMORPHONE HYDROCHLORIDE 1 MG: 2 INJECTION, SOLUTION INTRAMUSCULAR; INTRAVENOUS; SUBCUTANEOUS at 03:02

## 2019-01-01 RX ADMIN — Medication 400 MG: at 02:05

## 2019-01-01 RX ADMIN — ONDANSETRON 4 MG: 2 INJECTION INTRAMUSCULAR; INTRAVENOUS at 12:03

## 2019-01-01 RX ADMIN — ERYTHROPOIETIN 20000 UNITS: 20000 INJECTION, SOLUTION INTRAVENOUS; SUBCUTANEOUS at 08:03

## 2019-01-01 RX ADMIN — CALCITRIOL 0.5 MCG: 0.25 CAPSULE ORAL at 05:03

## 2019-01-01 RX ADMIN — SEVELAMER CARBONATE 800 MG: 800 TABLET, FILM COATED ORAL at 07:07

## 2019-01-01 RX ADMIN — MIDODRINE HYDROCHLORIDE 10 MG: 5 TABLET ORAL at 04:04

## 2019-01-01 RX ADMIN — Medication 0.02 MCG/KG/MIN: at 05:04

## 2019-01-01 RX ADMIN — SEVELAMER CARBONATE 800 MG: 800 TABLET, FILM COATED ORAL at 03:03

## 2019-01-01 RX ADMIN — OXACILLIN SODIUM: 2 INJECTION, POWDER, FOR SOLUTION INTRAMUSCULAR; INTRAVENOUS at 02:03

## 2019-01-01 RX ADMIN — CEFTAZIDIME 1 G: 1 INJECTION, POWDER, FOR SOLUTION INTRAMUSCULAR; INTRAVENOUS at 05:03

## 2019-01-01 RX ADMIN — FERROUS SULFATE TAB EC 325 MG (65 MG FE EQUIVALENT) 325 MG: 325 (65 FE) TABLET DELAYED RESPONSE at 02:05

## 2019-01-01 RX ADMIN — ROPIVACAINE HYDROCHLORIDE 6 ML/HR: 2 INJECTION, SOLUTION EPIDURAL; INFILTRATION at 11:07

## 2019-01-01 RX ADMIN — ONDANSETRON 4 MG: 2 INJECTION INTRAMUSCULAR; INTRAVENOUS at 09:07

## 2019-01-01 RX ADMIN — SODIUM CHLORIDE, SODIUM LACTATE, CALCIUM CHLORIDE, MAGNESIUM CHLORIDE AND DEXTROSE: 1.5; 538; 448; 18.3; 5.08 INJECTION, SOLUTION INTRAPERITONEAL at 04:02

## 2019-01-01 RX ADMIN — Medication 0.16 MCG/KG/MIN: at 09:07

## 2019-01-01 RX ADMIN — SODIUM CHLORIDE, SODIUM LACTATE, POTASSIUM CHLORIDE, AND CALCIUM CHLORIDE: .6; .31; .03; .02 INJECTION, SOLUTION INTRAVENOUS at 09:02

## 2019-01-01 RX ADMIN — LIDOCAINE HYDROCHLORIDE 100 MG: 20 INJECTION, SOLUTION INTRAVENOUS at 09:03

## 2019-01-01 RX ADMIN — CALCITRIOL 0.5 MCG: 0.25 CAPSULE ORAL at 10:03

## 2019-01-01 RX ADMIN — PROPOFOL 35 MCG/KG/MIN: 10 INJECTION, EMULSION INTRAVENOUS at 06:07

## 2019-01-01 RX ADMIN — SODIUM CHLORIDE, SODIUM LACTATE, POTASSIUM CHLORIDE, AND CALCIUM CHLORIDE 500 ML: .6; .31; .03; .02 INJECTION, SOLUTION INTRAVENOUS at 10:03

## 2019-01-01 RX ADMIN — EPINEPHRINE 0.7 MCG/KG/MIN: 1 INJECTION PARENTERAL at 02:07

## 2019-01-01 RX ADMIN — ASPIRIN 81 MG: 81 TABLET, COATED ORAL at 03:04

## 2019-01-01 RX ADMIN — LOPERAMIDE HYDROCHLORIDE 2 MG: 2 CAPSULE ORAL at 07:04

## 2019-01-01 RX ADMIN — CEFEPIME 2 G: 2 INJECTION, POWDER, FOR SOLUTION INTRAVENOUS at 03:04

## 2019-01-01 RX ADMIN — CEFTAZIDIME 2 G: 2 INJECTION, POWDER, FOR SOLUTION INTRAVENOUS at 09:02

## 2019-01-01 RX ADMIN — PHENYLEPHRINE HYDROCHLORIDE 80 MCG: 10 INJECTION INTRAVENOUS at 10:03

## 2019-01-01 RX ADMIN — GLYCOPYRROLATE 0.1 MG: 0.2 INJECTION, SOLUTION INTRAMUSCULAR; INTRAVENOUS at 03:07

## 2019-01-01 RX ADMIN — PROMETHAZINE HYDROCHLORIDE 6.25 MG: 25 INJECTION INTRAMUSCULAR; INTRAVENOUS at 10:07

## 2019-01-01 RX ADMIN — GABAPENTIN 200 MG: 100 CAPSULE ORAL at 08:04

## 2019-01-01 RX ADMIN — HEPARIN SODIUM AND DEXTROSE 20 UNITS/KG/HR: 10000; 5 INJECTION INTRAVENOUS at 10:04

## 2019-01-01 RX ADMIN — LACTOBACILLUS TAB 4 TABLET: TAB at 04:03

## 2019-01-01 RX ADMIN — INSULIN ASPART 2 UNITS: 100 INJECTION, SOLUTION INTRAVENOUS; SUBCUTANEOUS at 04:05

## 2019-01-01 RX ADMIN — LIDOCAINE HYDROCHLORIDE 100 MG: 20 INJECTION, SOLUTION INTRAVENOUS at 02:07

## 2019-01-01 RX ADMIN — CALCITRIOL CAPSULES 0.25 MCG 0.5 MCG: 0.25 CAPSULE ORAL at 12:04

## 2019-01-01 RX ADMIN — SODIUM CHLORIDE: 0.9 INJECTION, SOLUTION INTRAVENOUS at 04:04

## 2019-01-01 RX ADMIN — IPRATROPIUM BROMIDE AND ALBUTEROL SULFATE 3 ML: .5; 3 SOLUTION RESPIRATORY (INHALATION) at 11:04

## 2019-01-01 RX ADMIN — ASPIRIN 81 MG: 81 TABLET, COATED ORAL at 03:05

## 2019-01-01 RX ADMIN — FLUCONAZOLE 200 MG: 100 TABLET ORAL at 04:03

## 2019-01-01 RX ADMIN — ERYTHROPOIETIN 4700 UNITS: 10000 INJECTION, SOLUTION INTRAVENOUS; SUBCUTANEOUS at 01:03

## 2019-01-01 RX ADMIN — KETAMINE HYDROCHLORIDE 30 MG: 100 INJECTION, SOLUTION, CONCENTRATE INTRAMUSCULAR; INTRAVENOUS at 02:07

## 2019-01-01 RX ADMIN — SODIUM CHLORIDE: 0.9 INJECTION, SOLUTION INTRAVENOUS at 07:07

## 2019-01-01 RX ADMIN — EPHEDRINE SULFATE 5 MG: 50 INJECTION INTRAMUSCULAR; INTRAVENOUS; SUBCUTANEOUS at 02:07

## 2019-01-01 RX ADMIN — SODIUM CHLORIDE 250 ML: 0.9 INJECTION, SOLUTION INTRAVENOUS at 05:03

## 2019-01-01 RX ADMIN — BENZONATATE 100 MG: 100 CAPSULE ORAL at 04:04

## 2019-01-01 RX ADMIN — NEPHROCAP 1 CAPSULE: 1 CAP ORAL at 02:07

## 2019-01-01 RX ADMIN — DOCUSATE SODIUM 100 MG: 100 CAPSULE, LIQUID FILLED ORAL at 08:07

## 2019-01-01 RX ADMIN — HEPARIN SODIUM AND DEXTROSE 11 UNITS/KG/HR: 10000; 5 INJECTION INTRAVENOUS at 09:04

## 2019-01-01 RX ADMIN — METOCLOPRAMIDE 10 MG: 5 INJECTION, SOLUTION INTRAMUSCULAR; INTRAVENOUS at 03:03

## 2019-01-01 RX ADMIN — PANTOPRAZOLE SODIUM 40 MG: 40 TABLET, DELAYED RELEASE ORAL at 02:03

## 2019-01-01 RX ADMIN — LIDOCAINE HYDROCHLORIDE 1 ML: 10 INJECTION, SOLUTION INFILTRATION; PERINEURAL at 12:07

## 2019-01-01 RX ADMIN — ROPIVACAINE HYDROCHLORIDE 6 ML/HR: 2 INJECTION, SOLUTION EPIDURAL; INFILTRATION at 03:07

## 2019-01-01 RX ADMIN — ATORVASTATIN CALCIUM 40 MG: 40 TABLET, FILM COATED ORAL at 09:02

## 2019-01-01 RX ADMIN — ROCURONIUM BROMIDE 10 MG: 10 INJECTION, SOLUTION INTRAVENOUS at 02:07

## 2019-01-01 RX ADMIN — HEPARIN SODIUM AND DEXTROSE 8 UNITS/KG/HR: 10000; 5 INJECTION INTRAVENOUS at 02:04

## 2019-01-01 RX ADMIN — VANCOMYCIN HYDROCHLORIDE 1750 MG: 100 INJECTION, POWDER, LYOPHILIZED, FOR SOLUTION INTRAVENOUS at 01:07

## 2019-01-01 RX ADMIN — INSULIN DETEMIR 10 UNITS: 100 INJECTION, SOLUTION SUBCUTANEOUS at 10:03

## 2019-01-01 RX ADMIN — PROPOFOL 50 MG: 10 INJECTION, EMULSION INTRAVENOUS at 09:03

## 2019-01-01 RX ADMIN — SODIUM CHLORIDE 500 ML: 0.9 INJECTION, SOLUTION INTRAVENOUS at 03:04

## 2019-01-01 RX ADMIN — NATEGLINIDE 120 MG: 60 TABLET, COATED ORAL at 08:04

## 2019-01-01 RX ADMIN — PIPERACILLIN AND TAZOBACTAM 4.5 G: 4; .5 INJECTION, POWDER, LYOPHILIZED, FOR SOLUTION INTRAVENOUS; PARENTERAL at 03:07

## 2019-01-01 RX ADMIN — ALPRAZOLAM 0.25 MG: 0.25 TABLET ORAL at 06:03

## 2019-01-01 RX ADMIN — INSULIN ASPART 1 UNITS: 100 INJECTION, SOLUTION INTRAVENOUS; SUBCUTANEOUS at 10:04

## 2019-01-01 RX ADMIN — CLOPIDOGREL 75 MG: 75 TABLET, FILM COATED ORAL at 11:04

## 2019-01-01 RX ADMIN — SODIUM CHLORIDE 100 ML: 0.9 INJECTION, SOLUTION INTRAVENOUS at 04:07

## 2019-01-01 RX ADMIN — MAGNESIUM SULFATE IN WATER 2 G: 40 INJECTION, SOLUTION INTRAVENOUS at 09:04

## 2019-01-01 RX ADMIN — PROMETHAZINE HYDROCHLORIDE 6.25 MG: 25 INJECTION INTRAMUSCULAR; INTRAVENOUS at 01:07

## 2019-01-01 RX ADMIN — Medication 10 MCG/HR: at 04:07

## 2019-01-01 RX ADMIN — HEPARIN SODIUM AND DEXTROSE 20 UNITS/KG/HR: 10000; 5 INJECTION INTRAVENOUS at 05:04

## 2019-01-01 RX ADMIN — LOPERAMIDE HYDROCHLORIDE 2 MG: 2 CAPSULE ORAL at 02:03

## 2019-01-01 RX ADMIN — SODIUM CHLORIDE 500 ML: 0.9 INJECTION, SOLUTION INTRAVENOUS at 09:03

## 2019-01-01 RX ADMIN — ONDANSETRON 4 MG: 2 INJECTION INTRAMUSCULAR; INTRAVENOUS at 08:07

## 2019-01-01 RX ADMIN — MIDODRINE HYDROCHLORIDE 10 MG: 2.5 TABLET ORAL at 01:07

## 2019-01-01 RX ADMIN — Medication 0.8 MCG/KG/MIN: at 07:07

## 2019-01-01 RX ADMIN — INSULIN ASPART 2 UNITS: 100 INJECTION, SOLUTION INTRAVENOUS; SUBCUTANEOUS at 06:03

## 2019-01-01 RX ADMIN — DEXMEDETOMIDINE HYDROCHLORIDE 0.2 MCG/KG/HR: 100 INJECTION, SOLUTION, CONCENTRATE INTRAVENOUS at 01:07

## 2019-01-01 RX ADMIN — HEPARIN SODIUM AND DEXTROSE 8 UNITS/KG/HR: 10000; 5 INJECTION INTRAVENOUS at 05:04

## 2019-01-01 RX ADMIN — OXYCODONE HYDROCHLORIDE 15 MG: 10 TABLET ORAL at 08:07

## 2019-01-01 RX ADMIN — MAGNESIUM SULFATE HEPTAHYDRATE 3 G: 500 INJECTION, SOLUTION INTRAMUSCULAR; INTRAVENOUS at 09:03

## 2019-01-01 RX ADMIN — MAGNESIUM SULFATE 1 G: 1 INJECTION INTRAVENOUS at 02:03

## 2019-01-01 RX ADMIN — GABAPENTIN 600 MG: 300 CAPSULE ORAL at 10:04

## 2019-01-01 RX ADMIN — HYDROMORPHONE HYDROCHLORIDE 1 MG: 2 INJECTION INTRAMUSCULAR; INTRAVENOUS; SUBCUTANEOUS at 02:02

## 2019-01-01 RX ADMIN — SODIUM CHLORIDE: 0.9 INJECTION, SOLUTION INTRAVENOUS at 10:03

## 2019-01-01 RX ADMIN — Medication 0.85 MCG/KG/MIN: at 08:07

## 2019-01-01 RX ADMIN — SODIUM CHLORIDE: 0.9 INJECTION, SOLUTION INTRAVENOUS at 09:03

## 2019-01-01 RX ADMIN — PANTOPRAZOLE SODIUM 40 MG: 40 INJECTION, POWDER, FOR SOLUTION INTRAVENOUS at 12:07

## 2019-01-01 RX ADMIN — SODIUM CHLORIDE: 9 INJECTION, SOLUTION INTRAVENOUS at 12:03

## 2019-01-01 RX ADMIN — SEVELAMER CARBONATE 800 MG: 800 TABLET, FILM COATED ORAL at 10:03

## 2019-01-01 RX ADMIN — ASPIRIN 81 MG: 81 TABLET, COATED ORAL at 06:03

## 2019-01-01 RX ADMIN — CALCITRIOL CAPSULES 0.25 MCG 0.5 MCG: 0.25 CAPSULE ORAL at 10:04

## 2019-01-01 RX ADMIN — INSULIN ASPART 1 UNITS: 100 INJECTION, SOLUTION INTRAVENOUS; SUBCUTANEOUS at 10:03

## 2019-01-01 RX ADMIN — MORPHINE SULFATE 1 MG: 4 INJECTION INTRAVENOUS at 09:03

## 2019-01-01 RX ADMIN — MIDODRINE HYDROCHLORIDE 10 MG: 5 TABLET ORAL at 05:04

## 2019-01-01 RX ADMIN — CINACALCET HYDROCHLORIDE 60 MG: 30 TABLET, FILM COATED ORAL at 08:05

## 2019-01-01 RX ADMIN — HEPARIN SODIUM 2000 UNITS: 1000 INJECTION, SOLUTION INTRAVENOUS; SUBCUTANEOUS at 08:07

## 2019-01-01 RX ADMIN — NOREPINEPHRINE BITARTRATE 1.4 MCG/KG/MIN: 1 INJECTION, SOLUTION, CONCENTRATE INTRAVENOUS at 06:07

## 2019-01-01 RX ADMIN — ASPIRIN 325 MG ORAL TABLET 325 MG: 325 PILL ORAL at 09:04

## 2019-01-01 RX ADMIN — SODIUM CHLORIDE 3 ML: 9 INJECTION, SOLUTION INTRAMUSCULAR; INTRAVENOUS; SUBCUTANEOUS at 10:05

## 2019-01-01 RX ADMIN — RAMELTEON 8 MG: 8 TABLET, FILM COATED ORAL at 11:03

## 2019-01-01 RX ADMIN — HEPARIN SODIUM 1000 UNITS: 1000 INJECTION, SOLUTION INTRAVENOUS; SUBCUTANEOUS at 10:07

## 2019-01-01 RX ADMIN — ACETAMINOPHEN 650 MG: 325 TABLET ORAL at 08:05

## 2019-01-01 RX ADMIN — PROCHLORPERAZINE EDISYLATE 10 MG: 5 INJECTION INTRAMUSCULAR; INTRAVENOUS at 03:03

## 2019-01-01 RX ADMIN — PANTOPRAZOLE SODIUM 40 MG: 40 INJECTION, POWDER, LYOPHILIZED, FOR SOLUTION INTRAVENOUS at 08:03

## 2019-01-01 RX ADMIN — CARVEDILOL 12.5 MG: 12.5 TABLET, FILM COATED ORAL at 11:03

## 2019-01-01 RX ADMIN — PANTOPRAZOLE SODIUM 40 MG: 40 INJECTION, POWDER, LYOPHILIZED, FOR SOLUTION INTRAVENOUS at 12:03

## 2019-01-01 RX ADMIN — HYDROMORPHONE HYDROCHLORIDE 1 MG: 2 INJECTION INTRAMUSCULAR; INTRAVENOUS; SUBCUTANEOUS at 02:03

## 2019-01-01 RX ADMIN — IOHEXOL 100 ML: 350 INJECTION, SOLUTION INTRAVENOUS at 04:03

## 2019-01-01 RX ADMIN — ERYTHROPOIETIN 5000 UNITS: 10000 INJECTION, SOLUTION INTRAVENOUS; SUBCUTANEOUS at 03:02

## 2019-01-01 RX ADMIN — FERROUS SULFATE TAB EC 325 MG (65 MG FE EQUIVALENT) 325 MG: 325 (65 FE) TABLET DELAYED RESPONSE at 01:07

## 2019-01-01 RX ADMIN — SODIUM BICARBONATE 650 MG TABLET 1300 MG: at 05:03

## 2019-01-01 RX ADMIN — GLYCERIN 1 SUPPOSITORY: 2 SUPPOSITORY RECTAL at 10:07

## 2019-01-01 RX ADMIN — EPOETIN ALFA-EPBX 5800 UNITS: 10000 INJECTION, SOLUTION INTRAVENOUS; SUBCUTANEOUS at 11:07

## 2019-01-01 RX ADMIN — IPRATROPIUM BROMIDE AND ALBUTEROL SULFATE 3 ML: .5; 3 SOLUTION RESPIRATORY (INHALATION) at 08:05

## 2019-01-01 RX ADMIN — ONDANSETRON 4 MG: 2 INJECTION INTRAMUSCULAR; INTRAVENOUS at 01:02

## 2019-01-01 RX ADMIN — IPRATROPIUM BROMIDE AND ALBUTEROL SULFATE 3 ML: .5; 3 SOLUTION RESPIRATORY (INHALATION) at 03:04

## 2019-01-01 RX ADMIN — MORPHINE SULFATE 4 MG: 4 INJECTION INTRAVENOUS at 11:07

## 2019-01-01 RX ADMIN — PROPOFOL 30 MG: 10 INJECTION, EMULSION INTRAVENOUS at 12:03

## 2019-01-01 RX ADMIN — HEPARIN SODIUM 1000 UNITS: 1000 INJECTION, SOLUTION INTRAVENOUS; SUBCUTANEOUS at 02:07

## 2019-01-01 RX ADMIN — MIDODRINE HYDROCHLORIDE 10 MG: 5 TABLET ORAL at 11:06

## 2019-01-01 RX ADMIN — HEPARIN SODIUM AND DEXTROSE 20 UNITS/KG/HR: 10000; 5 INJECTION INTRAVENOUS at 12:04

## 2019-01-01 RX ADMIN — KETAMINE HYDROCHLORIDE 25 MG: 50 INJECTION, SOLUTION, CONCENTRATE INTRAMUSCULAR; INTRAVENOUS at 10:03

## 2019-01-01 RX ADMIN — ACETAMINOPHEN 650 MG: 325 TABLET ORAL at 08:04

## 2019-01-01 RX ADMIN — POTASSIUM CHLORIDE 20 MEQ: 20 SOLUTION ORAL at 11:03

## 2019-01-01 RX ADMIN — ATORVASTATIN CALCIUM 40 MG: 40 TABLET, FILM COATED ORAL at 11:03

## 2019-01-01 RX ADMIN — MIDAZOLAM HYDROCHLORIDE 1 MG: 1 INJECTION, SOLUTION INTRAMUSCULAR; INTRAVENOUS at 02:07

## 2019-01-01 RX ADMIN — HEPARIN SODIUM AND DEXTROSE 12 UNITS/KG/HR: 10000; 5 INJECTION INTRAVENOUS at 06:04

## 2019-01-01 RX ADMIN — NEPHROCAP 1 CAPSULE: 1 CAP ORAL at 09:04

## 2019-01-01 RX ADMIN — Medication 400 MG: at 05:05

## 2019-01-01 RX ADMIN — DEXTROSE 250 ML: 10 SOLUTION INTRAVENOUS at 07:07

## 2019-01-01 RX ADMIN — PIPERACILLIN AND TAZOBACTAM 4.5 G: 4; .5 INJECTION, POWDER, LYOPHILIZED, FOR SOLUTION INTRAVENOUS; PARENTERAL at 12:03

## 2019-01-01 RX ADMIN — LOPERAMIDE HYDROCHLORIDE 2 MG: 2 CAPSULE ORAL at 02:04

## 2019-01-01 RX ADMIN — LOPERAMIDE HYDROCHLORIDE 2 MG: 2 CAPSULE ORAL at 05:03

## 2019-01-01 RX ADMIN — FENTANYL CITRATE 100 MCG: 50 INJECTION, SOLUTION INTRAMUSCULAR; INTRAVENOUS at 04:07

## 2019-01-01 RX ADMIN — SODIUM CHLORIDE: 0.9 INJECTION, SOLUTION INTRAVENOUS at 07:03

## 2019-01-01 RX ADMIN — GABAPENTIN 600 MG: 300 CAPSULE ORAL at 07:04

## 2019-01-01 RX ADMIN — BENZONATATE 100 MG: 100 CAPSULE ORAL at 03:04

## 2019-01-01 RX ADMIN — ALTEPLASE 2 MG: 2.2 INJECTION, POWDER, LYOPHILIZED, FOR SOLUTION INTRAVENOUS at 12:04

## 2019-01-01 RX ADMIN — HEPARIN SODIUM 1000 UNITS: 1000 INJECTION, SOLUTION INTRAVENOUS; SUBCUTANEOUS at 03:04

## 2019-01-01 RX ADMIN — ROCURONIUM BROMIDE 40 MG: 10 INJECTION, SOLUTION INTRAVENOUS at 02:07

## 2019-01-01 RX ADMIN — PANTOPRAZOLE SODIUM 40 MG: 40 INJECTION, POWDER, LYOPHILIZED, FOR SOLUTION INTRAVENOUS at 11:03

## 2019-01-01 RX ADMIN — ATORVASTATIN CALCIUM 40 MG: 20 TABLET, FILM COATED ORAL at 10:07

## 2019-01-01 RX ADMIN — VASOPRESSIN 0.08 UNITS/MIN: 20 INJECTION INTRAVENOUS at 04:07

## 2019-01-01 RX ADMIN — EPINEPHRINE 0.7 MCG/KG/MIN: 1 INJECTION PARENTERAL at 03:07

## 2019-01-01 RX ADMIN — INSULIN ASPART 2 UNITS: 100 INJECTION, SOLUTION INTRAVENOUS; SUBCUTANEOUS at 06:02

## 2019-01-01 RX ADMIN — Medication 0.12 MCG/KG/MIN: at 04:07

## 2019-01-01 RX ADMIN — EPINEPHRINE 0.4 MCG/KG/MIN: 1 INJECTION PARENTERAL at 09:07

## 2019-01-01 RX ADMIN — FENTANYL CITRATE 50 MCG: 50 INJECTION, SOLUTION INTRAMUSCULAR; INTRAVENOUS at 02:07

## 2019-01-01 RX ADMIN — HYDROMORPHONE HYDROCHLORIDE 1 MG: 2 INJECTION, SOLUTION INTRAMUSCULAR; INTRAVENOUS; SUBCUTANEOUS at 10:02

## 2019-01-01 RX ADMIN — MIDODRINE HYDROCHLORIDE 10 MG: 2.5 TABLET ORAL at 09:07

## 2019-01-01 RX ADMIN — FAMOTIDINE 20 MG: 10 INJECTION, SOLUTION INTRAVENOUS at 03:07

## 2019-01-01 RX ADMIN — SODIUM CHLORIDE: 0.9 INJECTION, SOLUTION INTRAVENOUS at 11:02

## 2019-01-01 RX ADMIN — ONDANSETRON 8 MG: 2 INJECTION INTRAMUSCULAR; INTRAVENOUS at 11:03

## 2019-01-01 RX ADMIN — FENTANYL CITRATE 50 MCG: 50 INJECTION INTRAMUSCULAR; INTRAVENOUS at 03:07

## 2019-01-01 RX ADMIN — NEPHROCAP 1 CAPSULE: 1 CAP ORAL at 10:04

## 2019-01-01 RX ADMIN — SUCRALFATE 1 G: 1 TABLET ORAL at 10:03

## 2019-01-01 RX ADMIN — PROCHLORPERAZINE EDISYLATE 10 MG: 5 INJECTION INTRAMUSCULAR; INTRAVENOUS at 02:03

## 2019-01-01 RX ADMIN — GABAPENTIN 100 MG: 100 CAPSULE ORAL at 05:02

## 2019-01-01 RX ADMIN — Medication 400 MG: at 04:05

## 2019-01-01 RX ADMIN — PANTOPRAZOLE SODIUM 40 MG: 40 TABLET, DELAYED RELEASE ORAL at 02:07

## 2019-01-01 RX ADMIN — GLYCOPYRROLATE 0.2 MG: 0.2 INJECTION, SOLUTION INTRAMUSCULAR; INTRAVENOUS at 09:03

## 2019-01-01 RX ADMIN — SODIUM CHLORIDE: 0.9 INJECTION, SOLUTION INTRAVENOUS at 11:07

## 2019-01-01 RX ADMIN — SODIUM CHLORIDE 500 ML: 0.9 INJECTION, SOLUTION INTRAVENOUS at 02:06

## 2019-01-01 RX ADMIN — ASPIRIN 81 MG: 81 TABLET, COATED ORAL at 10:04

## 2019-01-01 RX ADMIN — PANTOPRAZOLE SODIUM 40 MG: 40 INJECTION, POWDER, LYOPHILIZED, FOR SOLUTION INTRAVENOUS at 03:03

## 2019-01-01 RX ADMIN — INSULIN ASPART 2 UNITS: 100 INJECTION, SOLUTION INTRAVENOUS; SUBCUTANEOUS at 09:04

## 2019-01-01 RX ADMIN — DOBUTAMINE IN DEXTROSE 5 MCG/KG/MIN: 200 INJECTION, SOLUTION INTRAVENOUS at 06:07

## 2019-01-01 RX ADMIN — VASOPRESSIN 0.04 UNITS/MIN: 20 INJECTION INTRAVENOUS at 12:07

## 2019-01-01 RX ADMIN — SODIUM CHLORIDE, SODIUM LACTATE, CALCIUM CHLORIDE, MAGNESIUM CHLORIDE AND DEXTROSE: 1.5; 538; 448; 18.3; 5.08 INJECTION, SOLUTION INTRAPERITONEAL at 07:02

## 2019-01-01 RX ADMIN — LOPERAMIDE HYDROCHLORIDE 2 MG: 2 CAPSULE ORAL at 01:05

## 2019-01-01 RX ADMIN — OXYCODONE HYDROCHLORIDE 15 MG: 10 TABLET ORAL at 10:07

## 2019-01-01 RX ADMIN — MORPHINE SULFATE 4 MG: 4 INJECTION INTRAVENOUS at 04:02

## 2019-01-01 RX ADMIN — LORAZEPAM 1 MG: 2 INJECTION INTRAMUSCULAR at 06:03

## 2019-01-01 RX ADMIN — MIDODRINE HYDROCHLORIDE 20 MG: 5 TABLET ORAL at 08:04

## 2019-01-01 RX ADMIN — INSULIN ASPART 1 UNITS: 100 INJECTION, SOLUTION INTRAVENOUS; SUBCUTANEOUS at 11:02

## 2019-01-01 RX ADMIN — MAGNESIUM SULFATE IN WATER 2 G: 40 INJECTION, SOLUTION INTRAVENOUS at 07:03

## 2019-01-01 RX ADMIN — SODIUM CHLORIDE, SODIUM LACTATE, POTASSIUM CHLORIDE, AND CALCIUM CHLORIDE: .6; .31; .03; .02 INJECTION, SOLUTION INTRAVENOUS at 11:02

## 2019-01-01 RX ADMIN — ROPIVACAINE HYDROCHLORIDE 6 ML/HR: 2 INJECTION, SOLUTION EPIDURAL; INFILTRATION at 09:07

## 2019-01-01 RX ADMIN — SODIUM CHLORIDE, SODIUM LACTATE, CALCIUM CHLORIDE, MAGNESIUM CHLORIDE AND DEXTROSE: 1.5; 538; 448; 18.3; 5.08 INJECTION, SOLUTION INTRAPERITONEAL at 02:03

## 2019-01-01 RX ADMIN — GABAPENTIN 200 MG: 100 CAPSULE ORAL at 09:04

## 2019-01-01 RX ADMIN — ONDANSETRON 8 MG: 2 INJECTION INTRAMUSCULAR; INTRAVENOUS at 05:03

## 2019-01-01 RX ADMIN — HYDRALAZINE HYDROCHLORIDE 10 MG: 20 INJECTION INTRAMUSCULAR; INTRAVENOUS at 03:07

## 2019-01-01 RX ADMIN — SODIUM CHLORIDE 500 ML: 0.9 INJECTION, SOLUTION INTRAVENOUS at 11:07

## 2019-01-01 RX ADMIN — CINACALCET HYDROCHLORIDE 60 MG: 30 TABLET, FILM COATED ORAL at 09:05

## 2019-01-01 RX ADMIN — METOPROLOL SUCCINATE 25 MG: 25 TABLET, EXTENDED RELEASE ORAL at 11:04

## 2019-01-01 RX ADMIN — CEFTAZIDIME 1 G: 1 INJECTION, POWDER, FOR SOLUTION INTRAMUSCULAR; INTRAVENOUS at 07:03

## 2019-01-01 RX ADMIN — CLOPIDOGREL 75 MG: 75 TABLET, FILM COATED ORAL at 03:04

## 2019-01-01 RX ADMIN — POTASSIUM CHLORIDE 20 MEQ: 20 TABLET, EXTENDED RELEASE ORAL at 02:03

## 2019-01-01 RX ADMIN — LACTOBACILLUS TAB 4 TABLET: TAB at 06:03

## 2019-01-01 RX ADMIN — CINACALCET HYDROCHLORIDE 90 MG: 30 TABLET, COATED ORAL at 12:04

## 2019-01-01 RX ADMIN — ALTEPLASE 6 MG: 2.2 INJECTION, POWDER, LYOPHILIZED, FOR SOLUTION INTRAVENOUS at 08:07

## 2019-01-01 RX ADMIN — PHENYLEPHRINE HYDROCHLORIDE 200 MCG: 10 INJECTION INTRAVENOUS at 12:03

## 2019-01-01 RX ADMIN — INSULIN ASPART 2 UNITS: 100 INJECTION, SOLUTION INTRAVENOUS; SUBCUTANEOUS at 04:02

## 2019-01-01 RX ADMIN — SUCRALFATE 1 G: 1 TABLET ORAL at 04:03

## 2019-01-01 RX ADMIN — SODIUM BICARBONATE 650 MG TABLET 1300 MG: at 08:04

## 2019-01-01 RX ADMIN — MIDODRINE HYDROCHLORIDE 10 MG: 5 TABLET ORAL at 03:07

## 2019-01-01 RX ADMIN — DIPHENHYDRAMINE HYDROCHLORIDE 50 MG: 50 CAPSULE ORAL at 03:04

## 2019-01-01 RX ADMIN — SODIUM CHLORIDE 500 ML: 0.9 INJECTION, SOLUTION INTRAVENOUS at 02:04

## 2019-01-01 RX ADMIN — SODIUM CHLORIDE: 9 INJECTION, SOLUTION INTRAVENOUS at 09:03

## 2019-01-01 RX ADMIN — LORAZEPAM: 2 INJECTION INTRAMUSCULAR at 06:03

## 2019-01-01 RX ADMIN — GABAPENTIN 600 MG: 300 CAPSULE ORAL at 08:04

## 2019-01-01 RX ADMIN — RAMELTEON 8 MG: 8 TABLET, FILM COATED ORAL at 08:05

## 2019-01-01 RX ADMIN — MIDAZOLAM 2 MG: 1 INJECTION INTRAMUSCULAR; INTRAVENOUS at 09:03

## 2019-01-01 RX ADMIN — SUGAMMADEX 200 MG: 100 INJECTION, SOLUTION INTRAVENOUS at 03:07

## 2019-01-01 RX ADMIN — SODIUM CHLORIDE: 0.9 INJECTION, SOLUTION INTRAVENOUS at 09:04

## 2019-01-01 RX ADMIN — CHLORHEXIDINE GLUCONATE 0.12% ORAL RINSE 15 ML: 1.2 LIQUID ORAL at 10:07

## 2019-01-01 RX ADMIN — MORPHINE SULFATE 2 MG: 4 INJECTION INTRAVENOUS at 03:03

## 2019-01-01 RX ADMIN — MEPIVACAINE HYDROCHLORIDE 40 ML: 15 INJECTION, SOLUTION EPIDURAL; INFILTRATION at 11:03

## 2019-01-01 RX ADMIN — ATORVASTATIN CALCIUM 40 MG: 20 TABLET, FILM COATED ORAL at 09:06

## 2019-01-01 RX ADMIN — OXYCODONE HYDROCHLORIDE 15 MG: 10 TABLET ORAL at 04:07

## 2019-01-01 RX ADMIN — HYDROMORPHONE HYDROCHLORIDE: 2 INJECTION INTRAMUSCULAR; INTRAVENOUS; SUBCUTANEOUS at 04:03

## 2019-01-01 RX ADMIN — NEPHROCAP 1 CAPSULE: 1 CAP ORAL at 10:07

## 2019-02-21 PROBLEM — I63.9 CVA (CEREBRAL VASCULAR ACCIDENT): Status: RESOLVED | Noted: 2017-02-23 | Resolved: 2019-01-01

## 2019-02-21 PROBLEM — T85.71XA PERITONITIS DUE TO INFECTED PERITONEAL DIALYSIS CATHETER: Status: ACTIVE | Noted: 2019-01-01

## 2019-02-21 PROBLEM — K92.2 ACUTE GI BLEEDING: Status: RESOLVED | Noted: 2018-01-01 | Resolved: 2019-01-01

## 2019-02-21 PROBLEM — T85.71XA PERITONITIS ASSOCIATED WITH PERITONEAL DIALYSIS: Status: ACTIVE | Noted: 2019-01-01

## 2019-02-21 PROBLEM — K65.9 PERITONITIS DUE TO INFECTED PERITONEAL DIALYSIS CATHETER: Status: ACTIVE | Noted: 2019-01-01

## 2019-02-21 PROBLEM — G45.9 TIA (TRANSIENT ISCHEMIC ATTACK): Status: RESOLVED | Noted: 2017-02-24 | Resolved: 2019-01-01

## 2019-02-21 NOTE — ED PROVIDER NOTES
Encounter Date: 2/21/2019    SCRIBE #1 NOTE: I, Mike Alegria, am scribing for, and in the presence of,  Dr. Guy Lefort. I have scribed the entire note.       History     Chief Complaint   Patient presents with    Vomiting     48 year old female presents to ed cc of abdominal pain with diarrhea that began on sunday and emesis today. patient is pd patient last pd exchange was yesterday full treamtent took antibiotic pd treatment today.      Jenni Todd is a 48 y.o. female who  has a past medical history of Abnormal finding on Pap smear, HPV DNA positive (2014), Anemia associated with chronic renal failure, Blood type B+, Bulging discs - symptomatic , CAD (coronary artery disease), Diabetes mellitus, type 2, ESRD (end stage renal disease) (04/20/2004), FSGS (focal segmental glomerulosclerosis), Hyperlipidemia, Hypertension (2004), Neuropathy, Obesity, Secondary hyperparathyroidism, renal, TIA (transient ischemic attack), and Uterine fibroid.    The patient presents to the ED for emesis that started 4 days ago. Patient reports of having a gross amount of dark green/blackish emesis content and cloudy urine. States she was sent to the ED by Nephrology for further evaluation. She admits to abd soreness, abd distention, and feeling hot but reports she has not been passing flatus. She has not had BM in 3 days but reports of diarrhea 4 days ago.       The history is provided by the patient.     Review of patient's allergies indicates:   Allergen Reactions    Clindamycin Diarrhea    Flagyl [metronidazole hcl]     Metronidazole      Past Medical History:   Diagnosis Date    Abnormal finding on Pap smear, HPV DNA positive 2014    Anemia associated with chronic renal failure     on Epogen    Blood type B+     Bulging discs - symptomatic      CAD (coronary artery disease)     Diabetes mellitus, type 2     ESRD (end stage renal disease) 04/20/2004    FSGS (focal segmental glomerulosclerosis)     with collapsing     Hyperlipidemia     Hypertension 2004    Neuropathy     Obesity     Secondary hyperparathyroidism, renal     TIA (transient ischemic attack)     Uterine fibroid     small uterine      Past Surgical History:   Procedure Laterality Date    CARDIAC CATHETERIZATION      PCI x 2     SECTION, CLASSIC      COLONOSCOPY N/A 2018    Performed by Rodrig[facility] at [facility] (2ND FLR)    DEBRIDEMENT-WOUND Left 2016    Performed by [facility]ressa [facility]ox [facility] at [facility]    DIALYSIS FISTULA CREATION      multiple fistulas and grafts before PD     INCISION AND DRAINAGE (I&D), LABIAL N/A 2016    Performed by Harmony Fernandez MD at [facility]    INCISION AND DRAINAGE (I&D), LABIAL (ADD ON) Left 2016    Performed by Teressa Maddox MD at [facility]    INCISION AND DRAINAGE OF WOUND Left     VULVAR ABCESS WITH NECROSIS    ORIF, HIP Left 2018    Performed by Tevin Grullon MD at [facility]    PERITONEAL CATHETER INSERTION      PERMCATH REWIRE- TUNNELED CATH REWIRE Left 2017    Performed by Baldev Munroe MD at [facility] [facility]    PERMCATH REWIRE- TUNNELED CATH REWIRE N/A 10/5/2017    Performed by Baldev Munroe MD at [facility]    UMBILICAL HERNIA REPAIR       Family History   Problem Relation Age of Onset    Cancer Maternal Grandmother     Cancer Paternal Grandfather     Diabetes Maternal Aunt     Diabetes Paternal Aunt     Kidney disease Neg Hx     Heart disease Neg Hx     Ovarian cancer Neg Hx     Breast cancer Neg Hx      Social History     Tobacco Use    Smoking status: Never Smoker    Smokeless tobacco: Never Used   Substance Use Topics    Alcohol use: No     Comment: Pt reports some social use of about 1-2 drinks about every six months.    Drug use: No     Review of Systems   Constitutional: Negative for chills and fever.   HENT: Negative for congestion, rhinorrhea and sore throat.    Eyes: Negative for redness and visual disturbance.   Respiratory: Negative  for cough, shortness of breath and wheezing.    Cardiovascular: Negative for chest pain and palpitations.   Gastrointestinal: Positive for abdominal distention, abdominal pain, diarrhea, nausea and vomiting.   Genitourinary: Negative for dysuria and hematuria.   Musculoskeletal: Negative for back pain, myalgias and neck pain.   Skin: Negative for rash.   Neurological: Negative for dizziness, weakness and light-headedness.   Psychiatric/Behavioral: Negative for confusion.   All other systems reviewed and are negative.      Physical Exam     Initial Vitals [02/21/19 1536]   BP Pulse Resp Temp SpO2   103/66 105 20 98.8 °F (37.1 °C) 98 %      MAP       --         Physical Exam    Nursing note and vitals reviewed.  Constitutional: She appears well-developed and well-nourished.   Appears uncomfortable.   HENT:   Head: Normocephalic and atraumatic.   Mouth is dry.   Eyes: Conjunctivae and EOM are normal. Pupils are equal, round, and reactive to light.   Neck: Normal range of motion. Neck supple.   Cardiovascular: Regular rhythm, normal heart sounds and intact distal pulses.   No murmur heard.  Mild tachycardia.   Pulmonary/Chest: Breath sounds normal. No respiratory distress. She has no wheezes.   Abdominal: Soft. She exhibits distension. There is tenderness.   Diffuse abdominal tenderness to palpation.  PD cathether in the right mid abdomen with aqueous appearing solution present in tubing.   Musculoskeletal: Normal range of motion. She exhibits no edema.   Left upper arm av fistula without palpable thrill.   Neurological: She is alert and oriented to person, place, and time.   Skin: Skin is warm and dry.         ED Course   Procedures  Labs Reviewed   CBC W/ AUTO DIFFERENTIAL - Abnormal; Notable for the following components:       Result Value    RBC 3.31 (*)     Hemoglobin 10.3 (*)     Hematocrit 31.7 (*)     MCH 31.1 (*)     RDW 15.7 (*)     Platelets 414 (*)     All other components within normal limits    COMPREHENSIVE METABOLIC PANEL - Abnormal; Notable for the following components:    Sodium 129 (*)     Chloride 83 (*)     Glucose 232 (*)     BUN, Bld 62 (*)     Creatinine 11.4 (*)     Total Protein 8.5 (*)     Albumin 2.7 (*)     Anion Gap 23 (*)     eGFR if  4 (*)     eGFR if non  3 (*)     All other components within normal limits   LIPASE - Abnormal; Notable for the following components:    Lipase 68 (*)     All other components within normal limits   POCT GLUCOSE - Abnormal; Notable for the following components:    POCT Glucose 239 (*)     All other components within normal limits   URINALYSIS, REFLEX TO URINE CULTURE   POCT GLUCOSE MONITORING CONTINUOUS          Imaging Results    None          Medical Decision Making:   Differential Diagnosis:   Peritonitis, sbo, gib, roc, pancreas, sepsis, DKA  Clinical Tests:   Lab Tests: Ordered and Reviewed  ED Management:  AG acidosis without DKA.  Afebrile, WBC nl  Treated with PD cath abx (alec phipps) and culture drawn prior to arrival at [facility] per patient.     5:31 PM     Case discussed with [facility] for admission.                   ED Course as of Feb 21 1550   Thu Feb 21, 2019   1535 Sort note: Jenni Todd nontoxic/afebrile 48 y.o.  presented to the ED with c/o abdominal pain with diarrhea that started Sunday with emesis that started today.  PD with ABX earlier today.  Sent by Dr. Watt for peritonitis r/o.       Patient seen and medically screened by Physician assistant in Sort process due to ED crowding.  Appropriate tests and/or medications ordered.  Care transferred to an alternate provider when patient was placed in an Exam Room from the [facility] for physical exam, additional orders, and disposition. [facility]    [AM]      ED Course User Index  [AM] Ellyn Baldwin PA-C     Clinical Impression:     1. Peritonitis due to infected peritoneal dialysis catheter, initial encounter    2. Hyponatremia    3. ESRD (end stage renal  disease)    4. Nausea and vomiting, intractability of vomiting not specified, unspecified vomiting type    5. Abdominal pain, unspecified abdominal location            Disposition:   Disposition: Admitted  Condition: Stable     I, Dr. Guy Lefort, personally performed the services described in this documentation. All medical record entries made by the scribe were at my direction and in my presence. I have reviewed the chart and agree that the record reflects my personal performance and is accurate and complete. Guy Lefort, MD.  7:04 PM 02/21/2019                 Guy J. Lefort, MD  02/21/19 1901

## 2019-02-22 PROBLEM — S72.002D CLOSED FRACTURE OF LEFT HIP WITH ROUTINE HEALING: Status: RESOLVED | Noted: 2018-01-01 | Resolved: 2019-01-01

## 2019-02-22 PROBLEM — Z99.2 ESRD (END STAGE RENAL DISEASE) ON DIALYSIS: Status: RESOLVED | Noted: 2018-01-01 | Resolved: 2019-01-01

## 2019-02-22 PROBLEM — N18.6 ESRD (END STAGE RENAL DISEASE) ON DIALYSIS: Status: RESOLVED | Noted: 2018-01-01 | Resolved: 2019-01-01

## 2019-02-22 PROBLEM — D62 ACUTE BLOOD LOSS ANEMIA: Status: RESOLVED | Noted: 2018-01-01 | Resolved: 2019-01-01

## 2019-02-22 PROBLEM — R74.8 ABNORMAL LIVER ENZYMES: Status: RESOLVED | Noted: 2018-01-01 | Resolved: 2019-01-01

## 2019-02-22 NOTE — H&P
History & Physical  Family Medicine       Patient ID:  NAME: Jenni Todd  MR#: [id number]  : 1970    SUBJECTIVE:  CC: Vomiting (48 year old female presents to ed cc of abdominal pain with diarrhea that began on  and emesis today. patient is pd patient last pd exchange was yesterday full treamtent took antibiotic pd treatment today. )      HPI: Ms. Jenni Todd is a 48 y.o. female w/ PMH of HTN, DM, PAD, AoCD 2/2 CKD, on peritoneal dialysis since  presenting to the ED from [facility] for concerns of peritonitis. Patient was in her usual state of health until . Patient reports that she first had diarrhea and abdominal cramping. Patient has not been sleeping well since  due to the abdominal cramping. Patient also reports diaphoresis, fatigue and loss of appetite. At first patient did not think she had peritonitis as she had 5-6 episodes before with her last episode 10/2017.     Today, patient saw her PD nurse and started to vomit large amounts of dark, green emesis that help improved her symptoms. Then patient continued to have projectile vomiting that was dark and watery. Patient reports having cloudy fluid removed from her peritoneal cath and fluid was sent for culture. Patient received a dose of Vanc and Fortaz at 11:30AM on .     Patient received her flu vaccine this year. Denies any sick contacts or change in diet.       Past Medical History:   Diagnosis Date    Abnormal finding on Pap smear, HPV DNA positive     Anemia associated with chronic renal failure     on Epogen    Blood type B+     Bulging discs - symptomatic      CAD (coronary artery disease)     Diabetes mellitus, type 2     ESRD (end stage renal disease) 2004    FSGS (focal segmental glomerulosclerosis)     with collapsing    Hyperlipidemia     Hypertension     Neuropathy     Obesity     Secondary hyperparathyroidism, renal     TIA (transient ischemic attack)     Uterine fibroid      small uterine      Past Surgical History:   Procedure Laterality Date    CARDIAC CATHETERIZATION      PCI x 2     SECTION, CLASSIC      COLONOSCOPY N/A 2018    Performed by Rodrigue Russell MD at [facility] (2ND FLR)    DEBRIDEMENT-WOUND Left 2016    Performed by [facility]ox [facility] at [facility] OR    DIALYSIS FISTULA CREATION      multiple fistulas and grafts before PD     INCISION AND DRAINAGE (I&D), LABIAL N/A 2016    Performed by Harmony Fernandez MD at [facility] OR    INCISION AND DRAINAGE (I&D), LABIAL (ADD ON) Left 2016    Performed by Teressa Maddox MD at [facility] OR    INCISION AND DRAINAGE OF WOUND Left     VULVAR ABCESS WITH NECROSIS    ORIF, HIP Left 2018    Performed by Tevin Grullon MD at [facility] OR    PERITONEAL CATHETER INSERTION      PERMCATH REWIRE- TUNNELED CATH REWIRE Left 2017    Performed by Baldev Munroe MD at [facility] CATH LAB    PERMCATH REWIRE- TUNNELED CATH REWIRE N/A 10/5/2017    Performed by Baldev Munroe MD at [facility] CATH LAB    UMBILICAL HERNIA REPAIR       Family History   Problem Relation Age of Onset    Cancer Maternal Grandmother     Cancer Paternal Grandfather     Diabetes Maternal Aunt     Diabetes Paternal Aunt     Kidney disease Neg Hx     Heart disease Neg Hx     Ovarian cancer Neg Hx     Breast cancer Neg Hx      Social History     Socioeconomic History    Marital status: Single     Spouse name: None    Number of children: None    Years of education: None    Highest education level: None   Social Needs    Financial resource strain: None    Food insecurity - worry: None    Food insecurity - inability: None    Transportation needs - medical: None    Transportation needs - non-medical: None   Occupational History     Employer: [employer]   Tobacco Use    Smoking status: Never Smoker    Smokeless tobacco: Never Used   Substance and Sexual Activity    Alcohol use: No     Comment: Pt reports some social use  of about 1-2 drinks about every six months.    Drug use: No    Sexual activity: Not Currently     Partners: Male     Birth control/protection: None   Other Topics Concern    None   Social History Narrative     [place]    Single    One child    History of blood transfusion in 2004    Potential donor ??? brother     Immunization History   Administered Date(s) Administered    Influenza - High Dose 10/08/2018    Pneumococcal Conjugate - 13 Valent 11/21/2016    Td - PF (ADULT) 05/20/2016     No current facility-administered medications on file prior to encounter.      Current Outpatient Medications on File Prior to Encounter   Medication Sig Dispense Refill    amLODIPine (NORVASC) 5 MG tablet Take 1 tablet (5 mg total) by mouth once daily. 30 tablet 11    aspirin (ECOTRIN) 81 MG EC tablet Take 1 tablet (81 mg total) by mouth once daily. 30 tablet 12    atorvastatin (LIPITOR) 40 MG tablet Take 40 mg by mouth every evening.       calcitRIOL (ROCALTROL) 0.5 MCG Cap Take 1 capsule by mouth every morning.       carvedilol (COREG) 3.125 MG tablet Take 1 tablet (3.125 mg total) by mouth 2 (two) times daily. 60 tablet 11    cinacalcet (SENSIPAR) 90 MG Tab Take 1 tablet by mouth every evening.       clopidogrel (PLAVIX) 75 mg tablet Take 1 tablet (75 mg total) by mouth once daily. 30 tablet 11    epoetin adonay 10,000 unit/mL Soln 1 mL, epoetin adonay 20,000 unit/mL Soln 1 mL Inject 30,000 Units into the vein every 14 (fourteen) days.      gabapentin (NEURONTIN) 100 MG capsule 1 capsule 3 (three) times daily.      heparin sodium,porcine (HEPARIN, PORCINE,) 5,000 unit/mL injection Inject 1.6 mLs (8,000 Units total) into the skin every 12 (twelve) hours. 10 mL 1    HUMULIN R REGULAR U-100 INSULN 100 unit/mL injection Inject 200 units into dialysis bag every evening.      nateglinide (STARLIX) 120 MG tablet STARLIX 120MG 30 MINUTES BEFORE EACH MEAL.      ONETOUCH VERIO Strp USE 1 STRIP ONCE DAILY IN  VITRO  3    PLAVIX 75 mg tablet TAKE 1 BY MOUTH DAILY (Patient taking differently: TAKE 1 BY MOUTH EVERY MORNING) 90 tablet 0    sevelamer carbonate (RENVELA) 800 mg Tab Take 3 to 4 tablets by mouth twice daily as needed      vitamin renal formula, B-complex-vitamin c-folic acid, (B COMPLEX-C-FOLIC ACID) 1 mg Cap Take 1 capsule by mouth once daily. 1 Capsule Oral Every day       Review of patient's allergies indicates:   Allergen Reactions    Clindamycin Diarrhea    Flagyl [metronidazole hcl]     Metronidazole        Review of Systems   Constitutional: Positive for chills, diaphoresis and malaise/fatigue. Negative for fever.   Respiratory: Negative for cough, shortness of breath and wheezing.    Cardiovascular: Negative for chest pain, palpitations and leg swelling.   Gastrointestinal: Positive for abdominal pain, diarrhea, nausea and vomiting. Negative for blood in stool and constipation.   Musculoskeletal: Negative for falls.   Neurological: Positive for weakness. Negative for dizziness, focal weakness, loss of consciousness and headaches.     OBJECTIVE:  Initial Vitals [02/21/19 1536]   BP Pulse Resp Temp SpO2   103/66 105 20 98.8 °F (37.1 °C) 98 %      MAP       --         Vitals 2/21/2019   Height -   Weight (lbs) 219.36   BMI (kg/m2) 33.35       Physical Exam:  General: AAOx3. Moderately distress laying in bed  HEENT: NCAT. PERRLA. EOMI. Vision grossly intact.Hearing grossly intact. MMM.   Neck: Supple. No JVD. No LAD. No thyromegaly or masses.   CV: RRR. NL S1/S2. No M/R/G.   Chest: NL effort. CTAB. No R/R/W. CW NTTP.  Abd: Hypoactive bowel sounds, Distended abdomen, Abdomen is tender to mild palpation, Peritoneal dialysis cath in the right upper abdomen clean and dry  Ext: No C/C/E. Peripheral pulses intact. NL ROM.   Skin: Intact. No rash. No lesions. Overall color, texture & turgor wnl for race & age. Dry skin  Neuro: CN II-XII intact. No focal deficit. Deferred strength exam  Psych: Good judgement  and reason. No A/V hallucinations. NL affect. No abnormal behaviors noted.     Laboratory:   Recent Results (from the past 24 hour(s))   POCT glucose    Collection Time: 02/21/19  3:47 PM   Result Value Ref Range    POCT Glucose 239 (H) 70 - 110 mg/dL   CBC auto differential    Collection Time: 02/21/19  4:22 PM   Result Value Ref Range    WBC 6.69 3.90 - 12.70 K/uL    RBC 3.31 (L) 4.00 - 5.40 M/uL    Hemoglobin 10.3 (L) 12.0 - 16.0 g/dL    Hematocrit 31.7 (L) 37.0 - 48.5 %    MCV 96 82 - 98 fL    MCH 31.1 (H) 27.0 - 31.0 pg    MCHC 32.5 32.0 - 36.0 g/dL    RDW 15.7 (H) 11.5 - 14.5 %    Platelets 414 (H) 150 - 350 K/uL    MPV 10.1 9.2 - 12.9 fL    Gran # (ANC) 4.5 1.8 - 7.7 K/uL    Lymph # 1.4 1.0 - 4.8 K/uL    Mono # 0.7 0.3 - 1.0 K/uL    Eos # 0.1 0.0 - 0.5 K/uL    Baso # 0.01 0.00 - 0.20 K/uL    nRBC 0 0 /100 WBC    Gran% 67.5 38.0 - 73.0 %    Lymph% 20.6 18.0 - 48.0 %    Mono% 10.6 4.0 - 15.0 %    Eosinophil% 1.2 0.0 - 8.0 %    Basophil% 0.1 0.0 - 1.9 %    Large/Giant Platelets Present     Differential Method Automated    Comprehensive metabolic panel    Collection Time: 02/21/19  4:22 PM   Result Value Ref Range    Sodium 129 (L) 136 - 145 mmol/L    Potassium 4.1 3.5 - 5.1 mmol/L    Chloride 83 (L) 95 - 110 mmol/L    CO2 23 23 - 29 mmol/L    Glucose 232 (H) 70 - 110 mg/dL    BUN, Bld 62 (H) 6 - 20 mg/dL    Creatinine 11.4 (H) 0.5 - 1.4 mg/dL    Calcium 9.1 8.7 - 10.5 mg/dL    Total Protein 8.5 (H) 6.0 - 8.4 g/dL    Albumin 2.7 (L) 3.5 - 5.2 g/dL    Total Bilirubin 0.7 0.1 - 1.0 mg/dL    Alkaline Phosphatase 128 55 - 135 U/L    AST 18 10 - 40 U/L    ALT 12 10 - 44 U/L    Anion Gap 23 (H) 8 - 16 mmol/L    eGFR if African American 4 (A) >60 mL/min/1.73 m^2    eGFR if non African American 3 (A) >60 mL/min/1.73 m^2   Lipase    Collection Time: 02/21/19  4:22 PM   Result Value Ref Range    Lipase 68 (H) 4 - 60 U/L   Beta - Hydroxybutyrate, Serum    Collection Time: 02/21/19  6:07 PM   Result Value Ref Range     Beta-Hydroxybutyrate 0.3 0.0 - 0.5 mmol/L     Imaging Results    None         ASSESSMENT & PLAN:  Ms. Jenni Todd is a 48 y.o. female w/ PMH of ESRD on Peritoneal Dialysis, Hypertension, Anemia of chronic disease 2/2 to CKD, DM, PAD presenting for concerns of peritonitis    Peritonitis 2/2 Peritoneal Dialysis  PD initiated on 4/20/04  Hx of 5-6 Peritonitis in the past  Sent to ED by Nephrology for concerns of peritonitis  Cultures were taken at [facility]  Received Vanc and Fortaz at 11:30 by PD nurse  Afebrile, No leukocytosis, WBC on admission: 6.69   Blood cultures x 2  Consulted Nephrology  Continue Vanc and Fortaz    Hypertension  BP in the 100/60s  BP goal <140/80  Start IVF's  Hold BP meds for now    Anemia of Chronic Disease secondary to CKD  H/H: 10.3/31.7  Baseline H/H: 8/25  Bun/Cr: 62/11.4 at baseline  Will continue to monitor    DM  A1C (9/12/18): 6.3  Decrease insulin from home 200U to 150U as patient is not tolerating PO   Continue to monitor with POCT glucose QID  SSI    Code: Full  Diet: NPO  Ppx: Aspirin, Plavix  Dispo: Pending Nephro consult and recs      Carlene Lopez, PGY-1  Family Medicine  02/21/2019

## 2019-02-22 NOTE — PLAN OF CARE
Problem: Adult Inpatient Plan of Care  Goal: Plan of Care Review  Outcome: Ongoing (interventions implemented as appropriate)   02/22/19 8986   Plan of Care Review   Plan of Care Reviewed With patient   Progress no change   Patient awake, alert and oriented. VSS. On tele, no ectopy on tele.complains of pain, controlled with medication.Patient on PD. Blood glucose monitored ac/hs and covered per sliding scale. Patient on antibiotic for infection.Bed alarm remains on, call light in reach.

## 2019-02-22 NOTE — ED NOTES
Pt to Room 18 with c/o nausea, vomiting, and diarrhea x 2 days. Pt states her vomiting worsened today and the color changed from yellow to dark green/brown. Pt also complains of severe abdominal pain and states she possibly has peritonitis again. Pt describes pain as pressure, squeezing, and tightness. Pain rated as a 9/10. Pt denies any chest pain, SOB, dizziness, weakness, headache, or vision changes. Pt is AAO x 3. Respirations even and unlabored, lung sounds clear and equal bilaterally. Skin warm and dry to touch, no swelling noted. Abdomen firm and distended. BS hypoactive. Pt uncomfortable but no acute distress noted. Family at the bedside. Cardiac monitor applied. Will continue to monitor closely.

## 2019-02-22 NOTE — ED NOTES
Pt states her abdominal pain has improved drastically after pain medication administration. VSS. No acute distress noted. Will continue to monitor closely.

## 2019-02-22 NOTE — PROGRESS NOTES
History & Physical  Family Medicine       Patient ID:  NAME: Jenni Todd  MR#: [id number]  : 1970    SUBJECTIVE:  CC: Vomiting (48 year old female presents to ed cc of abdominal pain with diarrhea that began on  and emesis today. patient is pd patient last pd exchange was yesterday full treamtent took antibiotic pd treatment today. )      HPI: Ms. Jenni Todd is a 48 y.o. female w/ PMH of HTN, DM, PAD, AoCD 2/2 CKD, on peritoneal dialysis since  presenting to the ED from [facility] for concerns of peritonitis. Patient was in her usual state of health until . Patient reports that she first had diarrhea and abdominal cramping. Patient has not been sleeping well since  due to the abdominal cramping. Patient also reports diaphoresis, fatigue and loss of appetite. At first patient did not think she had peritonitis as she had 5-6 episodes before with her last episode 10/2017.     Today, patient saw her PD nurse and started to vomit large amounts of dark, green emesis that help improved her symptoms. Then patient continued to have projectile vomiting that was dark and watery. Patient reports having cloudy fluid removed from her peritoneal cath and fluid was sent for culture. Patient received a dose of Vanc and Fortaz at 11:30AM on .     Patient received her flu vaccine this year. Denies any sick contacts or change in diet.       Past Medical History:   Diagnosis Date    Abnormal finding on Pap smear, HPV DNA positive     Anemia associated with chronic renal failure     on Epogen    Blood type B+     Bulging discs - symptomatic      CAD (coronary artery disease)     Diabetes mellitus, type 2     ESRD (end stage renal disease) 2004    FSGS (focal segmental glomerulosclerosis)     with collapsing    Hyperlipidemia     Hypertension     Neuropathy     Obesity     Secondary hyperparathyroidism, renal     TIA (transient ischemic attack)     Uterine fibroid      small uterine      Past Surgical History:   Procedure Laterality Date    CARDIAC CATHETERIZATION      PCI x 2     SECTION, CLASSIC      COLONOSCOPY N/A 2018    Performed by Rodrigue Russell MD at [facility] (2ND FLR)    DEBRIDEMENT-WOUND Left 2016    Performed by [facility] at [facility] [place]    DIALYSIS FISTULA CREATION      multiple fistulas and grafts before PD     INCISION AND DRAINAGE (I&D), LABIAL N/A 2016    Performed by Harmony Fernandez MD at [facility] [place] [facility]    INCISION AND DRAINAGE (I&D), LABIAL (ADD ON) Left 2016    Performed by Te[facility]a Maddox MD at [facility] [place] [facility]    INCISION AND DRAINAGE OF WOUND Left     VULVAR ABCESS WITH NECROSIS    ORIF, HIP Left 2018    Performed by Tevin Grullon MD at [facility] [place] [facility]    PERITONEAL CATHETER INSERTION      PERMCATH REWIRE- TUNNELED CATH REWIRE Left 2017    Performed by Baldev Munroe MD at [facility] [place] CATH LAB    PERMCATH REWIRE- TUNNELED CATH REWIRE N/A 10/5/2017    Performed by Baldev Munroe MD at [facility] [place] CATH LAB    UMBILICAL HERNIA REPAIR       Family History   Problem Relation Age of Onset    Cancer Maternal Grandmother     Cancer Paternal Grandfather     Diabetes Maternal Aunt     Diabetes Paternal Aunt     Kidney disease Neg Hx     Heart disease Neg Hx     Ovarian cancer Neg Hx     Breast cancer Neg Hx      Social History     Socioeconomic History    Marital status: Single     Spouse name: None    Number of children: None    Years of education: None    Highest education level: None   Social Needs    Financial resource strain: None    Food insecurity - worry: None    Food insecurity - inability: None    Transportation needs - medical: None    Transportation needs - non-medical: None   Occupational History     Employer: [employer]   Tobacco Use    Smoking status: Never Smoker    Smokeless tobacco: Never Used   Substance and Sexual Activity    Alcohol use: No     Comment: Pt reports some social use  of about 1-2 drinks about every six months.    Drug use: No    Sexual activity: Not Currently     Partners: Male     Birth control/protection: None   Other Topics Concern    None   Social History Narrative     [place]    Single    One child    History of blood transfusion in 2004    Potential donor ??? brother     Immunization History   Administered Date(s) Administered    Influenza - High Dose 10/08/2018    Pneumococcal Conjugate - 13 Valent 11/21/2016    Td - PF (ADULT) 05/20/2016     No current facility-administered medications on file prior to encounter.      Current Outpatient Medications on File Prior to Encounter   Medication Sig Dispense Refill    amLODIPine (NORVASC) 5 MG tablet Take 1 tablet (5 mg total) by mouth once daily. 30 tablet 11    aspirin (ECOTRIN) 81 MG EC tablet Take 1 tablet (81 mg total) by mouth once daily. 30 tablet 12    atorvastatin (LIPITOR) 40 MG tablet Take 40 mg by mouth every evening.       calcitRIOL (ROCALTROL) 0.5 MCG Cap Take 1 capsule by mouth every morning.       carvedilol (COREG) 3.125 MG tablet Take 1 tablet (3.125 mg total) by mouth 2 (two) times daily. 60 tablet 11    cinacalcet (SENSIPAR) 90 MG Tab Take 1 tablet by mouth every evening.       clopidogrel (PLAVIX) 75 mg tablet Take 1 tablet (75 mg total) by mouth once daily. 30 tablet 11    epoetin adonay 10,000 unit/mL Soln 1 mL, epoetin adonay 20,000 unit/mL Soln 1 mL Inject 30,000 Units into the vein every 14 (fourteen) days.      gabapentin (NEURONTIN) 100 MG capsule 1 capsule 3 (three) times daily.      heparin sodium,porcine (HEPARIN, PORCINE,) 5,000 unit/mL injection Inject 1.6 mLs (8,000 Units total) into the skin every 12 (twelve) hours. 10 mL 1    HUMULIN R REGULAR U-100 INSULN 100 unit/mL injection Inject 200 units into dialysis bag every evening.      nateglinide (STARLIX) 120 MG tablet STARLIX 120MG 30 MINUTES BEFORE EACH MEAL.      ONETOUCH VERIO Strp USE 1 STRIP ONCE DAILY IN  VITRO  3    PLAVIX 75 mg tablet TAKE 1 BY MOUTH DAILY (Patient taking differently: TAKE 1 BY MOUTH EVERY MORNING) 90 tablet 0    sevelamer carbonate (RENVELA) 800 mg Tab Take 3 to 4 tablets by mouth twice daily as needed      vitamin renal formula, B-complex-vitamin c-folic acid, (B COMPLEX-C-FOLIC ACID) 1 mg Cap Take 1 capsule by mouth once daily. 1 Capsule Oral Every day       Review of patient's allergies indicates:   Allergen Reactions    Clindamycin Diarrhea    Flagyl [metronidazole hcl]     Metronidazole        Review of Systems   Constitutional: Positive for chills, diaphoresis and malaise/fatigue. Negative for fever.   Respiratory: Negative for cough, shortness of breath and wheezing.    Cardiovascular: Negative for chest pain, palpitations and leg swelling.   Gastrointestinal: Positive for abdominal pain, diarrhea, nausea and vomiting. Negative for blood in stool and constipation.   Musculoskeletal: Negative for falls.   Neurological: Positive for weakness. Negative for dizziness, focal weakness, loss of consciousness and headaches.     OBJECTIVE:  Initial Vitals [02/21/19 1536]   BP Pulse Resp Temp SpO2   103/66 105 20 98.8 °F (37.1 °C) 98 %      MAP       --         Vitals 2/21/2019   Height -   Weight (lbs) 219.36   BMI (kg/m2) 33.35       Physical Exam:  General: AAOx3. Moderately distress laying in bed  HEENT: NCAT. PERRLA. EOMI. Vision grossly intact.Hearing grossly intact. MMM.   Neck: Supple. No JVD. No LAD. No thyromegaly or masses.   CV: RRR. NL S1/S2. No M/R/G.   Chest: NL effort. CTAB. No R/R/W. CW NTTP.  Abd: Hypoactive bowel sounds, Distended abdomen, Abdomen is tender to mild palpation, Peritoneal dialysis cath in the right upper abdomen clean and dry  Ext: No C/C/E. Peripheral pulses intact. NL ROM.   Skin: Intact. No rash. No lesions. Overall color, texture & turgor wnl for race & age. Dry skin  Neuro: CN II-XII intact. No focal deficit. Deferred strength exam  Psych: Good judgement  and reason. No A/V hallucinations. NL affect. No abnormal behaviors noted.     Laboratory:   Recent Results (from the past 24 hour(s))   POCT glucose    Collection Time: 02/21/19  3:47 PM   Result Value Ref Range    POCT Glucose 239 (H) 70 - 110 mg/dL   CBC auto differential    Collection Time: 02/21/19  4:22 PM   Result Value Ref Range    WBC 6.69 3.90 - 12.70 K/uL    RBC 3.31 (L) 4.00 - 5.40 M/uL    Hemoglobin 10.3 (L) 12.0 - 16.0 g/dL    Hematocrit 31.7 (L) 37.0 - 48.5 %    MCV 96 82 - 98 fL    MCH 31.1 (H) 27.0 - 31.0 pg    MCHC 32.5 32.0 - 36.0 g/dL    RDW 15.7 (H) 11.5 - 14.5 %    Platelets 414 (H) 150 - 350 K/uL    MPV 10.1 9.2 - 12.9 fL    Gran # (ANC) 4.5 1.8 - 7.7 K/uL    Lymph # 1.4 1.0 - 4.8 K/uL    Mono # 0.7 0.3 - 1.0 K/uL    Eos # 0.1 0.0 - 0.5 K/uL    Baso # 0.01 0.00 - 0.20 K/uL    nRBC 0 0 /100 WBC    Gran% 67.5 38.0 - 73.0 %    Lymph% 20.6 18.0 - 48.0 %    Mono% 10.6 4.0 - 15.0 %    Eosinophil% 1.2 0.0 - 8.0 %    Basophil% 0.1 0.0 - 1.9 %    Large/Giant Platelets Present     Differential Method Automated    Comprehensive metabolic panel    Collection Time: 02/21/19  4:22 PM   Result Value Ref Range    Sodium 129 (L) 136 - 145 mmol/L    Potassium 4.1 3.5 - 5.1 mmol/L    Chloride 83 (L) 95 - 110 mmol/L    CO2 23 23 - 29 mmol/L    Glucose 232 (H) 70 - 110 mg/dL    BUN, Bld 62 (H) 6 - 20 mg/dL    Creatinine 11.4 (H) 0.5 - 1.4 mg/dL    Calcium 9.1 8.7 - 10.5 mg/dL    Total Protein 8.5 (H) 6.0 - 8.4 g/dL    Albumin 2.7 (L) 3.5 - 5.2 g/dL    Total Bilirubin 0.7 0.1 - 1.0 mg/dL    Alkaline Phosphatase 128 55 - 135 U/L    AST 18 10 - 40 U/L    ALT 12 10 - 44 U/L    Anion Gap 23 (H) 8 - 16 mmol/L    eGFR if African American 4 (A) >60 mL/min/1.73 m^2    eGFR if non African American 3 (A) >60 mL/min/1.73 m^2   Lipase    Collection Time: 02/21/19  4:22 PM   Result Value Ref Range    Lipase 68 (H) 4 - 60 U/L   Beta - Hydroxybutyrate, Serum    Collection Time: 02/21/19  6:07 PM   Result Value Ref Range     Beta-Hydroxybutyrate 0.3 0.0 - 0.5 mmol/L     Imaging Results    None         ASSESSMENT & PLAN:  Ms. Jenni Todd is a 48 y.o. female w/ PMH of ESRD on Peritoneal Dialysis, Hypertension, Anemia of chronic disease 2/2 to CKD, DM, PAD presenting for concerns of peritonitis    Peritonitis 2/2 Peritoneal Dialysis  PD initiated on 4/20/04  Hx of 5-6 Peritonitis in the past  Sent to ED by Nephrology for concerns of peritonitis  Cultures were taken at [facility]  Received Vanc and Fortaz at 11:30 by PD nurse  Afebrile, No leukocytosis, WBC on admission: 6.69   Blood cultures x 2  Consulted Nephrology  Continue Vanc and Fortaz    Hypertension  BP in the 100/60s  BP goal <140/80  Start IVF's  Hold BP meds for now    Anemia of Chronic Disease secondary to CKD  H/H: 10.3/31.7  Baseline H/H: 8/25  Bun/Cr: 62/11.4 at baseline  Will continue to monitor    DM  A1C (9/12/18): 6.3  Decrease insulin from home 200U to 150U as patient is not tolerating PO   Continue to monitor with POCT glucose QID  SSI    Code: Full  Diet: NPO  Ppx: Aspirin, Plavix  Dispo: Pending Nephro consult and recs      Carlene Lopez, PGY-1  Family Medicine  02/21/2019

## 2019-02-22 NOTE — NURSING
Spoke to [facility] about NPO order, if pt is okay to give night medications.  MD stated okay to give meds.

## 2019-02-22 NOTE — PLAN OF CARE
VN cued into patients room for introduction and rounding. Pt asleep in bed, no acute distress noted at this time. Will attempt to round at a later time. Will continue to monitor and intervene PRN.        02/22/19 0922   Type of Frequent Check   Type Other (see comments)  (VN rounds)   Safety/Activity   Patient Rounds bed in low position;visualized patient   Safety Promotion/Fall Prevention side rails raised x 2   Positioning   Body Position side-lying, left   Head of Bed (HOB) HOB elevated   Pain/Comfort/Sleep   Preferred Pain Scale FACES (Gonzalez-Neely FACES Pain Rating Scale)   Pain Rating (0-10): Rest 0

## 2019-02-22 NOTE — NURSING
Patient instructed on the advantages of using the ipad.  Patient stated that she wasn't interested in using one at this time.

## 2019-02-22 NOTE — NURSING
Informed [facility] family practice that pt had vomiting episode since 0130, pt had zofran @ 0133, but pt stated it didn't work much.  Referred that pt had nightly peritoneal dialysis, pt stated she wish to take different medication afterward.  MD stated he will put Phenergan.

## 2019-02-22 NOTE — PLAN OF CARE
Problem: Adult Inpatient Plan of Care  Goal: Plan of Care Review  Patient on RA, no respiratory distress noted. Will continue to monitor.

## 2019-02-22 NOTE — ED NOTES
Abdominal pain completely resolved after pain medication administration. Vital signs remain stable. No acute distress noted. Will continue to monitor closely.

## 2019-02-22 NOTE — PLAN OF CARE
TN met with pt, pt lives her daughter, pt requires assistance with ADLs, pt uses a rollator and glucometer, pt also has BP machine at home, pt still drives, pt's father to provide transportation on discharge, pt is a PD pt, pt is established with [facility] for all dialysis needs, pt's nephrologist is Dr. Watt.    Pt to call PD nurse Mera from [facility] to resume antibiotics with PD fluid at home, per Mera, she spoke with Dr. Carmenza Vallecillo regarding pt antibiotics with PD.     Discharge brochure and blue discharge folder given to pt. TN updated contact information on Digital Message Display.       02/22/19 1232   Discharge Assessment   Assessment Type Discharge Planning Assessment   Confirmed/corrected address and phone number on facesheet? Yes   Assessment information obtained from? Patient   Prior to hospitilization cognitive status: Alert/Oriented   Prior to hospitalization functional status: Assistive Equipment;Needs Assistance   Current cognitive status: Alert/Oriented   Current Functional Status: Assistive Equipment;Needs Assistance   Lives With child(crystal), adult   Able to Return to Prior Arrangements yes   Is patient able to care for self after discharge? Yes   Patient's perception of discharge disposition home or selfcare   Readmission Within the Last 30 Days no previous admission in last 30 days   Patient currently being followed by outpatient case management? No   Patient currently receives home health services? No   Patient currently receives any other outside agency services? No   Equipment Currently Used at Home power chair;rollator;glucometer   Do you have any problems affording any of your prescribed medications? No   Is the patient taking medications as prescribed? yes   Does the patient have transportation home? Yes   Transportation Anticipated family or friend will provide   Dialysis Name and Scheduled days [facility], pt is PD patient, Dr. Watt is nephrologist    Does the patient receive  services at the [facility]? No   Discharge Plan A Home with family   Discharge Plan B Home Health;Home   Patient/Family in Agreement with Plan yes   Carmen Bacon RN-BC  Transitional Navigator  [phone]

## 2019-02-22 NOTE — PLAN OF CARE
Problem: Adult Inpatient Plan of Care  Goal: Plan of Care Review  Outcome: Ongoing (interventions implemented as appropriate)   02/22/19 0440   Plan of Care Review   Plan of Care Reviewed With patient   Pt came from ER.  POC reviewed with the pt, verbalized understanding.  AAOx4.  Peritoneal dialysis in progress at night.  On NPO.  Complaint of distended abdomen pain/fullness, rating as 8; hydromorphone given.  Pt had episode of vomiting during night, Zofran IV given.  On continuous telemetry monitor, SR to ST.  No ectopy noted.  Gram stain/ aerobic culture needs to be collected after dialysis done.  Fall precaution explained, contract signed.  Consult to nephrology in am.  Safety maintained, free of falls throughout shift.  Instructed to call for any assistance.  Will continue to monitor.

## 2019-02-22 NOTE — CONSULTS
NEPHROLOGY CONSULT NOTE    HPI & INTERVAL HISTORY:    Past Medical History:   Diagnosis Date    Abnormal finding on Pap smear, HPV DNA positive     Anemia associated with chronic renal failure     on Epogen    Blood type B+     Bulging discs - symptomatic      CAD (coronary artery disease)     Diabetes mellitus, type 2     ESRD (end stage renal disease) 2004    FSGS (focal segmental glomerulosclerosis)     with collapsing    Hyperlipidemia     Hypertension     Neuropathy     Obesity     Secondary hyperparathyroidism, renal     TIA (transient ischemic attack)     Uterine fibroid     small uterine       Past Surgical History:   Procedure Laterality Date    CARDIAC CATHETERIZATION      PCI x 2     SECTION, CLASSIC      COLONOSCOPY N/A 2018    Performed by Rodrigue Russell MD at [facility] (2ND FLR)    DEBRIDEMENT-WOUND Left 2016    Performed by [facility]ressa Maddox [facility] at [facility]    DIALYSIS FISTULA CREATION      multiple fistulas and grafts before PD     INCISION AND DRAINAGE (I&D), LABIAL N/A 2016    Performed by Harmony Fernandez MD at [facility]    INCISION AND DRAINAGE (I&D), LABIAL (ADD ON) Left 2016    Performed by Teressa Maddox MD at [facility]    INCISION AND DRAINAGE OF WOUND Left     VULVAR ABCESS WITH NECROSIS    ORIF, HIP Left 2018    Performed by Tevin Grullon MD at [facility]    PERITONEAL CATHETER INSERTION      PERMCATH REWIRE- TUNNELED CATH REWIRE Left 2017    Performed by Baldev Munroe MD at [facility] CATH LAB    PERMCATH REWIRE- TUNNELED CATH REWIRE N/A 10/5/2017    Performed by Baldev Munroe MD at [facility] CATH LAB    UMBILICAL HERNIA REPAIR        Review of patient's allergies indicates:   Allergen Reactions    Clindamycin Diarrhea    Flagyl [metronidazole hcl]     Metronidazole       Medications Prior to Admission   Medication Sig Dispense Refill Last Dose    amLODIPine (NORVASC) 5 MG tablet Take 1 tablet (5 mg total)  by mouth once daily. 30 tablet 11 2/21/2019    aspirin (ECOTRIN) 81 MG EC tablet Take 1 tablet (81 mg total) by mouth once daily. 30 tablet 12 2/21/2019    atorvastatin (LIPITOR) 40 MG tablet Take 40 mg by mouth every evening.    2/21/2019    calcitRIOL (ROCALTROL) 0.5 MCG Cap Take 1 capsule by mouth every morning.    2/21/2019    carvedilol (COREG) 3.125 MG tablet Take 1 tablet (3.125 mg total) by mouth 2 (two) times daily. 60 tablet 11 2/21/2019    cinacalcet (SENSIPAR) 90 MG Tab Take 1 tablet by mouth every evening.    2/21/2019    clopidogrel (PLAVIX) 75 mg tablet Take 1 tablet (75 mg total) by mouth once daily. 30 tablet 11 2/21/2019    epoetin adonay 10,000 unit/mL Soln 1 mL, epoetin adonay 20,000 unit/mL Soln 1 mL Inject 30,000 Units into the vein every 14 (fourteen) days.   2/21/2019    gabapentin (NEURONTIN) 100 MG capsule 1 capsule 3 (three) times daily.   2/21/2019    heparin sodium,porcine (HEPARIN, PORCINE,) 5,000 unit/mL injection Inject 1.6 mLs (8,000 Units total) into the skin every 12 (twelve) hours. 10 mL 1 2/21/2019    HUMULIN R REGULAR U-100 INSULN 100 unit/mL injection Inject 200 units into dialysis bag every evening.   2/21/2019    nateglinide (STARLIX) 120 MG tablet STARLIX 120MG 30 MINUTES BEFORE EACH MEAL.   2/21/2019    ONETOUCH VERIO Strp USE 1 STRIP ONCE DAILY IN VITRO  3 2/21/2019    PLAVIX 75 mg tablet TAKE 1 BY MOUTH DAILY (Patient taking differently: TAKE 1 BY MOUTH EVERY MORNING) 90 tablet 0 2/21/2019    sevelamer carbonate (RENVELA) 800 mg Tab Take 3 to 4 tablets by mouth twice daily as needed   2/21/2019    vitamin renal formula, B-complex-vitamin c-folic acid, (B COMPLEX-C-FOLIC ACID) 1 mg Cap Take 1 capsule by mouth once daily. 1 Capsule Oral Every day   2/21/2019       Social History     Socioeconomic History    Marital status: Single     Spouse name: Not on file    Number of children: Not on file    Years of education: Not on file    Highest education level: Not  on file   Social Needs    Financial resource strain: Not on file    Food insecurity - worry: Not on file    Food insecurity - inability: Not on file    Transportation needs - medical: Not on file    Transportation needs - non-medical: Not on file   Occupational History     Employer: [employer]   Tobacco Use    Smoking status: Never Smoker    Smokeless tobacco: Never Used   Substance and Sexual Activity    Alcohol use: No     Comment: Pt reports some social use of about 1-2 drinks about every six months.    Drug use: No    Sexual activity: Not Currently     Partners: Male     Birth control/protection: None   Other Topics Concern    Not on file   Social History Narrative     [place]    Single    One child    History of blood transfusion in 2004    Potential donor ??? brother        MEDS   aspirin  81 mg Oral Daily    atorvastatin  40 mg Oral QHS    calcitRIOL  0.5 mcg Oral QAM    carvedilol  3.125 mg Oral BID    cinacalcet  90 mg Oral QHS    clopidogrel  75 mg Oral Daily    gabapentin  100 mg Oral TID    insulin regular  200 Units Intraperitoneal with dinner    vitamin renal formula (B-complex-vitamin c-folic acid)  1 capsule Oral Daily                  CONTINOUS INFUSIONS:      Intake/Output Summary (Last 24 hours) at 2/22/2019 1354  Last data filed at 2/22/2019 0413  Gross per 24 hour   Intake 500 ml   Output 0 ml   Net 500 ml        HEMODYNAMICS:    Temp:  [97 °F (36.1 °C)-99.3 °F (37.4 °C)] 98.3 °F (36.8 °C)  Pulse:  [] 92  Resp:  [14-21] 17  SpO2:  [98 %-100 %] 98 %  BP: ()/(52-67) 95/61   Gen:   Nausea, vomiting  Abdominal pain  Cards: pulse 100  Pul: diminished breath sounds  Abdomen distended   Ext: no edema   Skin:dry   Dialysis Access:  PD catheter  LABS   Lab Results   Component Value Date    WBC 6.00 02/22/2019    HGB 11.1 (L) 02/22/2019    HCT 36.0 (L) 02/22/2019     (H) 02/22/2019     (H) 02/22/2019        Recent Labs   Lab  02/22/19  0729   *   CALCIUM 9.8   ALBUMIN 2.7*   PROT 9.0*   *   K 3.8   CO2 23   CL 82*   BUN 62*   CREATININE 11.3*   ALKPHOS 112   ALT 12   AST 11   BILITOT 0.7      Lab Results   Component Value Date    .7 (H) 04/19/2016    CALCIUM 9.8 02/22/2019    PHOS 8.4 (H) 02/22/2019      Lab Results   Component Value Date    IRON 19 (L) 09/15/2018    TIBC 182 (L) 09/15/2018    FERRITIN 1,920 (H) 04/16/2016        ABG  No results for input(s): PH, PO2, PCO2, HCO3, BE in the last 168 hours.      IMAGING:  CXR    ASSESSMENT / PLAN  ESRD  On Peritoneal dialysis  Peritoneal fluid   WBC 1683  Hyponatremia  Metabolic bone disease  Hyperphosphatemia  Poor nutrition  Anemia  Hypotensive  Blood pressure 95/61  Continue antibiotics IP  IVF  NPO

## 2019-02-22 NOTE — PROGRESS NOTES
"PGY-1 Progress Note [facility]    Follow up for: possible peritonitis  Chief Complaint   Patient presents with    Vomiting     48 year old female presents to ed cc of abdominal pain with diarrhea that began on  and emesis today. patient is pd patient last pd exchange was yesterday full treamtent took antibiotic pd treatment today.        Hospital Stay Day 0     Subjective: Patient reports four episodes of emesis overnight despite zofran, last at 7am this morning. Completed peritoneal dialysis overnight. Remains afebrile, hypotensive. Endorses abdominal pain secondary to distention and heaving with emesis. Report last BM was diarrhea on Monday, but has had decreased po intake since then. Denies any CP, SOB, headaches, fever or chills.     Scheduled Meds:   amLODIPine  5 mg Oral Daily    aspirin  81 mg Oral Daily    atorvastatin  40 mg Oral QHS    calcitRIOL  0.5 mcg Oral QAM    carvedilol  3.125 mg Oral BID    ceftAZIDime (FORTAZ) IVPB  2 g Intravenous Q24H    cinacalcet  90 mg Oral QHS    clopidogrel  75 mg Oral Daily    gabapentin  100 mg Oral TID    insulin regular  200 Units Intraperitoneal with dinner    vitamin renal formula (B-complex-vitamin c-folic acid)  1 capsule Oral Daily     Continuous Infusions:  PRN Meds:dextrose 50%, dextrose 50%, glucagon (human recombinant), glucose, glucose, HYDROmorphone, insulin aspart U-100, ondansetron, sodium chloride 0.9%    Review of patient's allergies indicates:   Allergen Reactions    Clindamycin Diarrhea    Flagyl [metronidazole hcl]     Metronidazole        Objectives:     Vitals(Most Recent)      BP  Min: 91/57  Max: 122/56  Temp  Av.2 °F (36.8 °C)  Min: 97 °F (36.1 °C)  Max: 99.3 °F (37.4 °C)  Pulse  Av.1  Min: 94  Max: 105  Resp  Av.6  Min: 14  Max: 21  SpO2  Av %  Min: 98 %  Max: 100 %  Height  Av' 7.5" (171.5 cm)  Min: 5' 7.5" (171.5 cm)  Max: 5' 7.5" (171.5 cm)  Weight  Av.3 kg (144 lb 0.5 oz)  Min: 0.1 kg (3.5 oz)  " Max: 99.5 kg (219 lb 5.7 oz)             Vitals([id number])  Temp:  [97 °F (36.1 °C)-99.3 °F (37.4 °C)]   Pulse:  []   Resp:  [14-21]   BP: ()/(52-67)   SpO2:  [98 %-100 %]     I & O([id number])    Intake/Output Summary (Last 24 hours) at 2/22/2019 0922  Last data filed at 2/22/2019 0413  Gross per 24 hour   Intake 500 ml   Output 0 ml   Net 500 ml     Urinalysis  No results for input(s): COLORU, CLARITYU, SPECGRAV, PHUR, PROTEINUA, GLUCOSEU, BILIRUBINCON, BLOODU, WBCU, RBCU, BACTERIA, MUCUS, NITRITE, LEUKOCYTESUR, UROBILINOGEN, HYALINECASTS in the last 24 hours.    General: Moderately distressed. AAOx3.  HEENT: NCAT. PERRLA. EOMI.   Neck: Supple. No JVD. No LAD.    CV: RRR. NL S1/S2. No M/R/G.   Chest: NL effort. CTAB. No R/R/W.   Abd: Hypoactive bowel sounds. Abdomen distended and diffusely tender to palpation. No rebound or guarding. Peritoneal dialysis cath in the RUQ clean and dry  Ext: No C/C/E. Peripheral pulses intact. NL ROM.   Skin: Intact. No rash. No lesions.   Neuro: No focal deficit. Sensation intact.   Psych: Good judgement and reason. No A/V hallucinations. NL affect. No abnormal behaviors noted    LABS  CBC  Recent Labs   Lab 02/21/19  1622 02/22/19  0729   WBC 6.69 6.00   RBC 3.31* 3.61*   HGB 10.3* 11.1*   HCT 31.7* 36.0*   * 465*   MCV 96 100*   MCH 31.1* 30.7   MCHC 32.5 30.8*     BMP  Recent Labs   Lab 02/21/19  1622 02/22/19  0729   * 131*   K 4.1 3.8   CO2 23 23   CL 83* 82*   BUN 62* 62*   CREATININE 11.4* 11.3*   * 293*       POCT-Glucose  POCT Glucose   Date Value Ref Range Status   02/22/2019 336 (H) 70 - 110 mg/dL Final   02/21/2019 210 (H) 70 - 110 mg/dL Final   02/21/2019 223 (H) 70 - 110 mg/dL Final   02/21/2019 239 (H) 70 - 110 mg/dL Final       Recent Labs   Lab 02/21/19  1622 02/22/19  0729   CALCIUM 9.1 9.8   MG  --  1.7   PHOS  --  8.4*     LFT  Recent Labs   Lab 02/21/19  1622 02/22/19  0729   PROT 8.5* 9.0*   ALBUMIN 2.7* 2.7*   BILITOT 0.7 0.7   AST  18 11   ALKPHOS 128 112   ALT 12 12         COAGS  No results for input(s): PT, INR, APTT in the last 168 hours.  CE  No results for input(s): TROPONINI, CKTOTAL, CKMB in the last 168 hours.  ABGs  No results for input(s): PH, PCO2, PO2, HCO3, POCSATURATED, BE in the last 24 hours.  BNP  No results for input(s): BNP in the last 168 hours.  UA  No results for input(s): COLORU, CLARITYU, SPECGRAV, PHUR, PROTEINUA, GLUCOSEU, BLOODU, WBCU, RBCU, BACTERIA, MUCUS in the last 24 hours.    Invalid input(s):  BILIRUBINCON  LAST HbA1c  Lab Results   Component Value Date    HGBA1C 6.3 (H) 09/12/2018           Imaging  Imaging Results    None         Micro:  Microbiology Results (last 7 days)     Procedure Component Value Units Date/Time    Blood culture [[id number]] Collected:  02/21/19 1847    Order Status:  Completed Specimen:  Blood from Peripheral, Hand, Right Updated:  02/22/19 0915     Blood Culture, Routine No Growth to date    Blood culture [[id number]] Collected:  02/21/19 1901    Order Status:  Completed Specimen:  Blood from Peripheral, Hand, Left Updated:  02/22/19 0915     Blood Culture, Routine No Growth to date    Gram stain [[id number]]     Order Status:  No result Specimen:  Body Fluid from Peritoneal Fluid     Aerobic culture [[id number]]     Order Status:  No result Specimen:  Body Fluid from Peritoneal Fluid     Aerobic culture [[id number]] Collected:  02/22/19 0000    Order Status:  Canceled Specimen:  Body Fluid from Peritoneal Fluid Updated:  02/22/19 0001    Gram stain [[id number]] Collected:  02/22/19 0000    Order Status:  Canceled Specimen:  Body Fluid from Peritoneal Fluid Updated:  02/22/19 0001               Assessment/Plan:     Peritonitis 2/2 Peritoneal Dialysis  PD initiated on 4/20/04  Hx of 5-6 Peritonitis in the past  Sent to ED by Nephrology for concerns of peritonitis  Cultures taken at [facility] and [facility] at 11:30 by PD nurse   Afebrile, No leukocytosis, WBC 6 today  Blood cultures  x 2 in hospital  Consulted Nephrology, appreciate recs  Continue Vanc and Fortaz    Hypertension  BP in to 90s/50s  BP goal <140/80  Hold BP meds for now  Consult Nephrology regarding IVF's     Anemia of Chronic Disease secondary to CKD  H/H: 11.1/36.0  Baseline H/H: 8/25  BUN/Cr: 62/11.3, at baseline  Will continue to monitor    DM  A1C (9/12/18): 6.3  Morning glucose: 336  Insulin detemir 200U   Continue to monitor with POCT glucose QID  SSI    Code: full  Diet: NPO  Ppx: Aspirin, Plavix  Dispo: Pending Nephro consult and recs    Case discussed with staff    Carlene Lopez MD  02/22/2019

## 2019-02-23 NOTE — PROGRESS NOTES
Progress Note  Nephrology      Consult Requested By: Dominick Bueno MD      SUBJECTIVE:     Overnight events  Patient is a 48 y.o. female     Patient Active Problem List   Diagnosis    Neuropathy    ESRD (end stage renal disease) on PD ( initiated dialysis 04/20/2004)    FSGS (focal segmental glomerulosclerosis) biopsy proven with collapsing features    Anemia in chronic kidney disease, on chronic dialysis    Hypertension    Hyperlipidemia    Obesity    CAD (coronary artery disease) with recent PCI 12/18/2012    Diabetes mellitus, type 2    Awaiting organ transplant status    Bulging discs - symptomatic     Spinal stenosis of sacral and sacrococcygeal region    Hypertensive renal disease    Hypoalbuminemia    Cerebral infarction due to embolism of middle cerebral artery    Gait abnormality    Chronic low back pain    DDD (degenerative disc disease), lumbar    PAD (peripheral artery disease)    Peritonitis associated with peritoneal dialysis    Peritonitis due to infected peritoneal dialysis catheter     Past Medical History:   Diagnosis Date    Abnormal finding on Pap smear, HPV DNA positive 2014    Anemia associated with chronic renal failure     on Epogen    Blood type B+     Bulging discs - symptomatic      CAD (coronary artery disease)     Diabetes mellitus, type 2     ESRD (end stage renal disease) 04/20/2004    FSGS (focal segmental glomerulosclerosis)     with collapsing    Hyperlipidemia     Hypertension 2004    Neuropathy     Obesity     Secondary hyperparathyroidism, renal     TIA (transient ischemic attack)     Uterine fibroid     small uterine               OBJECTIVE:     Vitals:    02/23/19 0837 02/23/19 1135 02/23/19 1146 02/23/19 1200   BP: 122/68  (!) 110/58    BP Location: Right arm  Left arm    Patient Position: Sitting  Lying    Pulse: 99 91 97 89   Resp: 18 16     Temp: 97.3 °F (36.3 °C) 98.9 °F (37.2 °C)     TempSrc: Oral Oral     SpO2: 95% (!) 93%      Weight:       Height:           Temp: 98.9 °F (37.2 °C) (02/23/19 1135)  Pulse: 89 (02/23/19 1200)  Resp: 16 (02/23/19 1135)  BP: (!) 110/58 (02/23/19 1146)  SpO2: (!) 93 % (02/23/19 1135)               Medications:   aspirin  81 mg Oral Daily    atorvastatin  40 mg Oral QHS    calcitRIOL  0.5 mcg Oral QAM    carvedilol  3.125 mg Oral BID    cinacalcet  90 mg Oral QHS    clopidogrel  75 mg Oral Daily    gabapentin  100 mg Oral TID    Dianeal PD with additives   Intraperitoneal Once    vitamin renal formula (B-complex-vitamin c-folic acid)  1 capsule Oral Daily      lactated ringers 50 mL/hr at 02/23/19 1148               Physical Exam:  General appearance:NAD  No nausea   No vomiting  No diarrhea  Lungs: diminished breath sounds  Heart: pulse 89  Abdomen: distended, soft  Extremities: no edema  Skin: dry  Laboratory:  ABG  Labs reviewed  No results found for this or any previous visit (from the past 336 hour(s)).  Recent Results (from the past 336 hour(s))   CBC auto differential    Collection Time: 02/23/19  6:02 AM   Result Value Ref Range    WBC 8.37 3.90 - 12.70 K/uL    Hemoglobin 11.7 (L) 12.0 - 16.0 g/dL    Hematocrit 36.9 (L) 37.0 - 48.5 %    Platelets 539 (H) 150 - 350 K/uL   CBC auto differential    Collection Time: 02/22/19  7:29 AM   Result Value Ref Range    WBC 6.00 3.90 - 12.70 K/uL    Hemoglobin 11.1 (L) 12.0 - 16.0 g/dL    Hematocrit 36.0 (L) 37.0 - 48.5 %    Platelets 465 (H) 150 - 350 K/uL   CBC auto differential    Collection Time: 02/21/19  4:22 PM   Result Value Ref Range    WBC 6.69 3.90 - 12.70 K/uL    Hemoglobin 10.3 (L) 12.0 - 16.0 g/dL    Hematocrit 31.7 (L) 37.0 - 48.5 %    Platelets 414 (H) 150 - 350 K/uL     Urinalysis  No results for input(s): COLORU, CLARITYU, SPECGRAV, PHUR, PROTEINUA, GLUCOSEU, BILIRUBINCON, BLOODU, WBCU, RBCU, BACTERIA, MUCUS, NITRITE, LEUKOCYTESUR, UROBILINOGEN, HYALINECASTS in the last 24 hours.    Diagnostic Results:  X-Ray: Reviewed  US:  Reviewed  Echo: Reviewed  ACCESS    ASSESSMENT/PLAN:     ESRD  On Peritoneal dialysis  Peritoneal fluid     Gram stain -Gram positive cocci  Hyponatremia  Metabolic bone disease  Metabolic acidosis  Hyperphosphatemia  Poor nutrition  Anemia  Hypotensive  Blood pressure 110/58  Continue antibiotic IP- vanco  IVF  NPO

## 2019-02-23 NOTE — NURSING
Notified  to report a critical Phos level of 10.2. Left message with  she stated she will talk to Nephrologist.

## 2019-02-23 NOTE — PLAN OF CARE
VN note: VN cued into pt's room for introduction.  Patient sleeping at this time.  Patient does not appear to be in any pain or discomfort.  VN will continue to be available to patient and intervene prn.        02/23/19 0974   Type of Frequent Check   Type Patient Rounds  (Vn rounding)   Safety/Activity   Patient Rounds visualized patient;ID band on;call light in patient/parent reach   Safety Promotion/Fall Prevention side rails raised x 2   Safety Precautions emergency equipment at bedside   Positioning   Body Position supine   Head of Bed (HOB) HOB at 15 degrees   Pain/Comfort/Sleep   Preferred Pain Scale number (Numeric Rating Pain Scale)   Comfort/Acceptable Pain Level 0   Pain Rating (0-10): Rest 0       Cardiac/Telemetry Details / Alarms   Cardiac/Telemetry Monitor On Yes   Cardiac/Telemetry Audible Yes   Cardiac/Telemetry Alarms Set Yes   Assessments (Pre/Post)   Level of Consciousness (AVPU) alert

## 2019-02-23 NOTE — PLAN OF CARE
During VN rounding patient denies any questions or concerns at this time.  Patient denies any pain or discomfort.   I will continue to monitor the patient and intervene as needed.       02/23/19 1526   Type of Frequent Check   Type Patient Rounds  (Vn rounding)   Safety/Activity   Patient Rounds visualized patient;ID band on;call light in patient/parent reach   Safety Promotion/Fall Prevention side rails raised x 2   Safety Precautions emergency equipment at bedside   Positioning   Body Position supine   Head of Bed (HOB) HOB at 20-30 degrees   Pain/Comfort/Sleep   Preferred Pain Scale number (Numeric Rating Pain Scale)   Comfort/Acceptable Pain Level 0   Pain Rating (0-10): Rest 0   Pain Rating (0-10): Activity 0       Cardiac/Telemetry Details / Alarms   Cardiac/Telemetry Monitor On Yes   Cardiac/Telemetry Audible Yes   Cardiac/Telemetry Alarms Set Yes   Assessments (Pre/Post)   Level of Consciousness (AVPU) alert

## 2019-02-23 NOTE — PLAN OF CARE
Problem: Adult Inpatient Plan of Care  Goal: Plan of Care Review  Plan of care discussed with pt. AAO x 4, fall risk with 1 person assist. Pt had loose stool, no N/V this shift. Pain to abdominal area rating between 6-7 on pain scale. administered Dilaudid for pain Q4H. Phos level 10.2 notified MD ordered to administer Lactated Ringer's 50 ml/hr continuous. IV to R forearm 22g d/i, no redness or swelling noted and patent. Pt now on clear liquids and tolerating well. Will continue to monitor.

## 2019-02-23 NOTE — PROGRESS NOTES
PGY-1 Progress Note [facility]    Follow up for: possible peritonitis  Chief Complaint   Patient presents with    Vomiting     48 year old female presents to ed cc of abdominal pain with diarrhea that began on  and emesis today. patient is pd patient last pd exchange was yesterday full treamtent took antibiotic pd treatment today.        Hospital Stay Day 0     Subjective: Patient reports improvement with nausea. Denies any vomiting. Patient continues to be NPO with ice chips. Abdominal pain and distention is improving. Denies any chest pain, fever, chills, constipation or diarrhea.     Scheduled Meds:   aspirin  81 mg Oral Daily    atorvastatin  40 mg Oral QHS    calcitRIOL  0.5 mcg Oral QAM    carvedilol  3.125 mg Oral BID    cinacalcet  90 mg Oral QHS    clopidogrel  75 mg Oral Daily    gabapentin  100 mg Oral TID    vitamin renal formula (B-complex-vitamin c-folic acid)  1 capsule Oral Daily     Continuous Infusions:   sodium chloride 0.9% Stopped (19)     PRN Meds:dextrose 50%, dextrose 50%, glucagon (human recombinant), glucose, glucose, HYDROmorphone, HYDROmorphone, insulin aspart U-100, ondansetron, promethazine, sodium chloride 0.9%    Review of patient's allergies indicates:   Allergen Reactions    Clindamycin Diarrhea    Flagyl [metronidazole hcl]     Metronidazole        Objectives:     Vitals(Most Recent)      BP  Min: 90/57  Max: 122/68  Temp  Av.7 °F (36.5 °C)  Min: 96.8 °F (36 °C)  Max: 98.9 °F (37.2 °C)  Pulse  Av.6  Min: 91  Max: 99  Resp  Av.7  Min: 17  Max: 20  SpO2  Av.3 %  Min: 94 %  Max: 98 %  Weight  Av.1 kg (3.5 oz)  Min: 0.1 kg (3.5 oz)  Max: 0.1 kg (3.5 oz)             Vitals([id number])  Temp:  [96.8 °F (36 °C)-98.9 °F (37.2 °C)]   Pulse:  [91-99]   Resp:  [17-20]   BP: ()/(47-68)   SpO2:  [94 %-98 %]     I & O([id number])    Intake/Output Summary (Last 24 hours) at 2019 0986  Last data filed at 2019 0456  Gross per 24 hour    Intake 1091.67 ml   Output 0 ml   Net 1091.67 ml     Urinalysis  No results for input(s): COLORU, CLARITYU, SPECGRAV, PHUR, PROTEINUA, GLUCOSEU, BILIRUBINCON, BLOODU, WBCU, RBCU, BACTERIA, MUCUS, NITRITE, LEUKOCYTESUR, UROBILINOGEN, HYALINECASTS in the last 24 hours.    General: Moderately distressed. AAOx3.  HEENT: NCAT. PERRLA. EOMI.   Neck: Supple. No JVD. No LAD.    CV: RRR. NL S1/S2. No M/R/G.   Chest: NL effort. CTAB. No R/R/W.   Abd: Hypoactive bowel sounds. Abdomen distended and diffusely tender to palpation. No rebound or guarding. Peritoneal dialysis cath in the RUQ clean and dry  Ext: No C/C/E. Peripheral pulses intact. NL ROM.   Skin: Intact. No rash. No lesions.   Neuro: No focal deficit. Sensation intact.   Psych: Good judgement and reason. No A/V hallucinations. NL affect. No abnormal behaviors noted    LABS  CBC  Recent Labs   Lab 02/21/19  1622 02/22/19  0729 02/23/19  0602   WBC 6.69 6.00 8.37   RBC 3.31* 3.61* 3.83*   HGB 10.3* 11.1* 11.7*   HCT 31.7* 36.0* 36.9*   * 465* 539*   MCV 96 100* 96   MCH 31.1* 30.7 30.5   MCHC 32.5 30.8* 31.7*     BMP  Recent Labs   Lab 02/21/19  1622 02/22/19  0729 02/23/19  0602   * 131* 132*  132*   K 4.1 3.8 3.6  3.6   CO2 23 23 20*  20*   CL 83* 82* 81*  81*   BUN 62* 62* 61*  61*   CREATININE 11.4* 11.3* 10.9*  10.9*   * 293* 228*  228*       POCT-Glucose  POCT Glucose   Date Value Ref Range Status   02/23/2019 281 (H) 70 - 110 mg/dL Final   02/22/2019 249 (H) 70 - 110 mg/dL Final   02/22/2019 157 (H) 70 - 110 mg/dL Final   02/22/2019 265 (H) 70 - 110 mg/dL Final   02/22/2019 336 (H) 70 - 110 mg/dL Final   02/21/2019 210 (H) 70 - 110 mg/dL Final   02/21/2019 223 (H) 70 - 110 mg/dL Final   02/21/2019 239 (H) 70 - 110 mg/dL Final       Recent Labs   Lab 02/21/19  1622 02/22/19  0729 02/23/19  0602   CALCIUM 9.1 9.8 10.6*  10.6*   MG  --  1.7 1.9   PHOS  --  8.4* 10.2*     LFT  Recent Labs   Lab 02/21/19  1622 02/22/19  0729  02/23/19  0602   PROT 8.5* 9.0* 9.6*   ALBUMIN 2.7* 2.7* 2.7*  2.7*   BILITOT 0.7 0.7 0.7   AST 18 11 40   ALKPHOS 128 112 125   ALT 12 12 23         COAGS  No results for input(s): PT, INR, APTT in the last 168 hours.  CE  No results for input(s): TROPONINI, CKTOTAL, CKMB in the last 168 hours.  ABGs  No results for input(s): PH, PCO2, PO2, HCO3, POCSATURATED, BE in the last 24 hours.  BNP  No results for input(s): BNP in the last 168 hours.  UA  No results for input(s): COLORU, CLARITYU, SPECGRAV, PHUR, PROTEINUA, GLUCOSEU, BLOODU, WBCU, RBCU, BACTERIA, MUCUS in the last 24 hours.    Invalid input(s):  BILIRUBINCON  LAST HbA1c  Lab Results   Component Value Date    HGBA1C 6.3 (H) 09/12/2018           Imaging  Imaging Results    None         Micro:  Microbiology Results (last 7 days)     Procedure Component Value Units Date/Time    Blood culture [[id number]] Collected:  02/21/19 1901    Order Status:  Completed Specimen:  Blood from Peripheral, Hand, Left Updated:  02/23/19 0612     Blood Culture, Routine No Growth to date     Blood Culture, Routine No Growth to date    Blood culture [[id number]] Collected:  02/21/19 1847    Order Status:  Completed Specimen:  Blood from Peripheral, Hand, Right Updated:  02/23/19 0612     Blood Culture, Routine No Growth to date     Blood Culture, Routine No Growth to date    Aerobic culture [[id number]] Collected:  02/22/19 1830    Order Status:  Sent Specimen:  Body Fluid from Peritoneal Fluid Updated:  02/22/19 1841    Gram stain [[id number]] Collected:  02/22/19 1830    Order Status:  Sent Specimen:  Body Fluid from Peritoneal Fluid Updated:  02/22/19 1841    Aerobic culture [[id number]] Collected:  02/22/19 0000    Order Status:  Canceled Specimen:  Body Fluid from Peritoneal Fluid Updated:  02/22/19 0001    Gram stain [[id number]] Collected:  02/22/19 0000    Order Status:  Canceled Specimen:  Body Fluid from Peritoneal Fluid Updated:  02/22/19 0001               Assessment/Plan:      Peritonitis 2/2 Peritoneal Dialysis  PD initiated on 4/20/04  Hx of 5-6 episodes of Peritonitis in the past  Sent to ED by Nephrology for concerns of peritonitis  Cult[facility] taken at [facility]  Received Vanc and Fortaz at 11:30 by PD nurse   Afebrile, No leukocytosis, WBC 6 today  Blood cultures: NGTD  Consulted Nephrology, appreciate recs  Completed Vanc and Fortaz    Hypertension  BP in to 90s/50s  BP goal <140/80  Hold BP meds for now  Nephrology consulted and appreciate recs     Anemia of Chronic Disease secondary to CKD  Baseline H/H: 8/25  H/H improved from baseline 11.7/36.9  BUN/Cr  at baseline  Will continue to monitor    DM  A1C (9/12/18): 6.3  Morning glucose: 281  Insulin Detemir 200U in peritoneal bad  Continue to monitor with POCT glucose QID  Currently on  SSI      Code: full  Diet: NPO with ice chips  Ppx: Aspirin, Plavix  Dispo: Pending Nephro consult and recs    Case discussed with staff    Carlene Lopez MD  02/23/2019

## 2019-02-23 NOTE — PLAN OF CARE
Problem: Adult Inpatient Plan of Care  Goal: Plan of Care Review  Outcome: Ongoing (interventions implemented as appropriate)  Care plan and education plan updated.  Chart review complete.

## 2019-02-24 NOTE — PROGRESS NOTES
Progress Note  Nephrology      Consult Requested By: Dominick Bueno MD      SUBJECTIVE:     Overnight events  Patient is a 48 y.o. female     Patient Active Problem List   Diagnosis    Neuropathy    ESRD (end stage renal disease) on PD ( initiated dialysis 04/20/2004)    FSGS (focal segmental glomerulosclerosis) biopsy proven with collapsing features    Anemia in chronic kidney disease, on chronic dialysis    Hypertension    Hyperlipidemia    Obesity    CAD (coronary artery disease) with recent PCI 12/18/2012    Diabetes mellitus, type 2    Awaiting organ transplant status    Bulging discs - symptomatic     Spinal stenosis of sacral and sacrococcygeal region    Hypertensive renal disease    Hypoalbuminemia    Cerebral infarction due to embolism of middle cerebral artery    Gait abnormality    Chronic low back pain    DDD (degenerative disc disease), lumbar    PAD (peripheral artery disease)    Peritonitis associated with peritoneal dialysis    Peritonitis due to infected peritoneal dialysis catheter     Past Medical History:   Diagnosis Date    Abnormal finding on Pap smear, HPV DNA positive 2014    Anemia associated with chronic renal failure     on Epogen    Blood type B+     Bulging discs - symptomatic      CAD (coronary artery disease)     Diabetes mellitus, type 2     ESRD (end stage renal disease) 04/20/2004    FSGS (focal segmental glomerulosclerosis)     with collapsing    Hyperlipidemia     Hypertension 2004    Neuropathy     Obesity     Secondary hyperparathyroidism, renal     TIA (transient ischemic attack)     Uterine fibroid     small uterine               OBJECTIVE:     Vitals:    02/24/19 0718 02/24/19 0745 02/24/19 0917 02/24/19 1152   BP: (!) 102/50      BP Location: Left arm      Patient Position: Lying      Pulse: 89 93  95   Resp: 16      Temp: 98.1 °F (36.7 °C)      TempSrc: Oral      SpO2: (!) 92%  (!) 92%    Weight:       Height:           Temp: 98.1 °F  (36.7 °C) (02/24/19 0718)  Pulse: 95 (02/24/19 1152)  Resp: 16 (02/24/19 0718)  BP: (!) 102/50 (02/24/19 0718)  SpO2: (!) 92 % (02/24/19 0917)    Date 02/24/19 0700 - 02/25/19 0659   Shift 3833-6591 9193-1426 9890-3835 24 Hour Total   INTAKE   P.O. 305   305   Shift Total(mL/kg) 305(3047.8)   305(3047.8)   OUTPUT   Shift Total(mL/kg)       Weight (kg) 0.1 0.1 0.1 0.1             Medications:   aspirin  81 mg Oral Daily    atorvastatin  40 mg Oral QHS    calcitRIOL  0.5 mcg Oral QAM    carvedilol  3.125 mg Oral BID    cinacalcet  90 mg Oral QHS    clopidogrel  75 mg Oral Daily    epoetin adonay (PROCRIT) injection  5,000 Units Subcutaneous Once    gabapentin  100 mg Oral TID    vitamin renal formula (B-complex-vitamin c-folic acid)  1 capsule Oral Daily      lactated ringers 50 mL/hr at 02/23/19 1148     Physical Exam:  General appearance: NAD  Diarrhea 2 times   No vomiting  Abdominal discomfort   Lungs: diminished breath sounds  Heart: pulse 95  Abdomen: soft  distended  Extremities: no edema  Skin: dry  Laboratory:  ABG  Labs reviewed  No results found for this or any previous visit (from the past 336 hour(s)).  Recent Results (from the past 336 hour(s))   CBC with Automated Differential    Collection Time: 02/24/19  5:43 AM   Result Value Ref Range    WBC 9.58 3.90 - 12.70 K/uL    Hemoglobin 10.2 (L) 12.0 - 16.0 g/dL    Hematocrit 32.4 (L) 37.0 - 48.5 %    Platelets 568 (H) 150 - 350 K/uL   CBC auto differential    Collection Time: 02/23/19  6:02 AM   Result Value Ref Range    WBC 8.37 3.90 - 12.70 K/uL    Hemoglobin 11.7 (L) 12.0 - 16.0 g/dL    Hematocrit 36.9 (L) 37.0 - 48.5 %    Platelets 539 (H) 150 - 350 K/uL   CBC auto differential    Collection Time: 02/22/19  7:29 AM   Result Value Ref Range    WBC 6.00 3.90 - 12.70 K/uL    Hemoglobin 11.1 (L) 12.0 - 16.0 g/dL    Hematocrit 36.0 (L) 37.0 - 48.5 %    Platelets 465 (H) 150 - 350 K/uL     Urinalysis  No results for input(s): COLORU, CLARITYU,  SPECGRAV, PHUR, PROTEINUA, GLUCOSEU, BILIRUBINCON, BLOODU, WBCU, RBCU, BACTERIA, MUCUS, NITRITE, LEUKOCYTESUR, UROBILINOGEN, HYALINECASTS in the last 24 hours.    Diagnostic Results:  X-Ray: Reviewed  US: Reviewed  Echo: Reviewed  ACCESS    ASSESSMENT/PLAN:       ESRD  On Peritoneal dialysis  UF - 221 cc  Peritonitis  Peritoneal fluid -   Will repeat cell count  Gram stain -Gram positive cocci  Hyponatremia  Hypokalemia - replace  Metabolic bone disease  Metabolic acidosis  Hyperphosphatemia  Binders when able to tolerate food  Poor nutrition  Anemia  Epogen  Hypotensive  Blood pressure 110/58  Continue antibiotic IP.  Vanco level 23.2  Cefazolin 2 g IP today.  Repeat Vanco level am  Will d/c IVF after current bag complete  Clear liquids diet

## 2019-02-24 NOTE — PLAN OF CARE
Problem: Adult Inpatient Plan of Care  Goal: Plan of Care Review  Outcome: Ongoing (interventions implemented as appropriate)  Received pt on RA; no distress noted.

## 2019-02-24 NOTE — PLAN OF CARE
During VN rounding patient denies any questions or concerns at this time.  Patient denies any pain or discomfort.  Per the patient the dialysis nurse just left out from setting up her machine.  Per the patient she normally does PD around 2000 in the evenings.  I will continue to monitor the patient and intervene as needed.       02/24/19 1515   Type of Frequent Check   Type Patient Rounds  (VN rounding)   Safety/Activity   Patient Rounds visualized patient;ID band on;call light in patient/parent reach   Safety Promotion/Fall Prevention side rails raised x 2   Safety Precautions emergency equipment at bedside   Positioning   Body Position supine   Head of Bed (HOB) HOB at 20-30 degrees   Pain/Comfort/Sleep   Preferred Pain Scale number (Numeric Rating Pain Scale)   Comfort/Acceptable Pain Level 2   Pain Rating (0-10): Rest 0   Pain Rating (0-10): Activity 0       Cardiac/Telemetry Details / Alarms   Cardiac/Telemetry Monitor On Yes   Cardiac/Telemetry Audible Yes   Cardiac/Telemetry Alarms Set Yes   Assessments (Pre/Post)   Level of Consciousness (AVPU) alert

## 2019-02-24 NOTE — PLAN OF CARE
VN note: VN cued into pt's room for introduction. VN informed pt that VN would be working closely along side bedside nurse, PCT, and the rest of care team and making rounds throughout the shift. Pt verbalized understanding. Allowed time for questions. Patient denies any pain or discomfort at this time.   Patient educated on safety to call before getting up, because we don't want the patient to fall and harm themselves in any way.  VN will continue to be available to patient and intervene prn.        02/24/19 0955   Type of Frequent Check   Type Patient Rounds  (VN rounding)   Safety/Activity   Patient Rounds visualized patient;ID band on;call light in patient/parent reach   Safety Promotion/Fall Prevention side rails raised x 2   Safety Precautions emergency equipment at bedside   Positioning   Body Position supine   Head of Bed (HOB) HOB at 20 degrees   Pain/Comfort/Sleep   Preferred Pain Scale number (Numeric Rating Pain Scale)   Comfort/Acceptable Pain Level 5   Pain Rating (0-10): Rest 2   Pain Rating (0-10): Activity 2   Pain Management Interventions other (see comments)  (per pt the nurse gave her pain meds)       Cardiac/Telemetry Details / Alarms   Cardiac/Telemetry Monitor On Yes   Cardiac/Telemetry Audible Yes   Cardiac/Telemetry Alarms Set Yes   Assessments (Pre/Post)   Level of Consciousness (AVPU) alert

## 2019-02-24 NOTE — PLAN OF CARE
Problem: Adult Inpatient Plan of Care  Goal: Plan of Care Review  Outcome: Ongoing (interventions implemented as appropriate)  Plan of care reviewed with pt, AA0X4. Pt has intermittent discomfort to abdomen pain scale at 7,  administered hydromorphone prn. Pt has ambulated to bathroom had one loose stool. Bed alarm on. Will continue to monitor.

## 2019-02-24 NOTE — PROGRESS NOTES
PGY-1 Progress Note  [facility]  Follow up for:   Chief Complaint   Patient presents with    Vomiting     48 year old female presents to ed cc of abdominal pain with diarrhea that began on  and emesis today. patient is pd patient last pd exchange was yesterday full treamtent took antibiotic pd treatment today.        Hospital Stay Day 0      Subjective: AF VSS, +bm/+flatus, tolerating diet without n/v. Pt reports abdominal pain on her right flank. It was controlled with dilaudid overnight.   Denies any CP, SOB, N/V/D, headaches, fever or chills.       Scheduled Meds:   aspirin  81 mg Oral Daily    atorvastatin  40 mg Oral QHS    calcitRIOL  0.5 mcg Oral QAM    carvedilol  3.125 mg Oral BID    cinacalcet  90 mg Oral QHS    clopidogrel  75 mg Oral Daily    gabapentin  100 mg Oral TID    vitamin renal formula (B-complex-vitamin c-folic acid)  1 capsule Oral Daily     Continuous Infusions:   lactated ringers 50 mL/hr at 19 1148     PRN Meds:dextrose 50%, dextrose 50%, glucagon (human recombinant), glucose, glucose, HYDROmorphone, HYDROmorphone, insulin aspart U-100, ondansetron, promethazine, sodium chloride 0.9%    Review of patient's allergies indicates:   Allergen Reactions    Clindamycin Diarrhea    Flagyl [metronidazole hcl]     Metronidazole        Objectives:     Vitals(Most Recent)      BP  Min: 89/51  Max: 122/68  Temp  Av.9 °F (36.6 °C)  Min: 96.7 °F (35.9 °C)  Max: 98.9 °F (37.2 °C)  Pulse  Av.5  Min: 86  Max: 99  Resp  Av.4  Min: 16  Max: 18  SpO2  Av.4 %  Min: 92 %  Max: 95 %             Vitals([id number])  Temp:  [96.7 °F (35.9 °C)-98.9 °F (37.2 °C)]   Pulse:  [86-99]   Resp:  [16-18]   BP: ()/(50-70)   SpO2:  [92 %-95 %]     I & O([id number])    Intake/Output Summary (Last 24 hours) at 2019 0756  Last data filed at 2019 0600  Gross per 24 hour   Intake 1390 ml   Output --   Net 1390 ml       Physical Exam:   General: AAOx3. NAD.  HEENT: BARNEY SOLORIO.  EOMI.     CV: RRR.No M/R/G.   Chest: NL effort. CTAB. No R/R/W.   Abd: +BS x 4. Soft. Distended and Tender right side. No rebound or guarding.   Ext: moving well. +distal pulses  Skin: Intact. No rash. No lesions.   Neuro: CN II-XII intact. No focal deficit.  Psych:  No A/V hallucinations. NL affect. No abnormal behaviors noted    LABS  CBC  Recent Labs   Lab 02/22/19  0729 02/23/19  0602 02/24/19  0543   WBC 6.00 8.37 9.58   RBC 3.61* 3.83* 3.34*   HGB 11.1* 11.7* 10.2*   HCT 36.0* 36.9* 32.4*   * 539* 568*   * 96 97   MCH 30.7 30.5 30.5   MCHC 30.8* 31.7* 31.5*       CMP  Recent Labs   Lab 02/22/19  0729 02/23/19  0602 02/24/19  0543   * 132*  132* 131*   K 3.8 3.6  3.6 3.5   CO2 23 20*  20* 22*   CL 82* 81*  81* 87*   BUN 62* 61*  61* 62*   CREATININE 11.3* 10.9*  10.9* 10.7*   * 228*  228* 221*     Recent Labs   Lab 02/22/19  0729 02/23/19  0602 02/24/19  0543   CALCIUM 9.8 10.6*  10.6* 9.4   MG 1.7 1.9 1.7   PHOS 8.4* 10.2* 8.7*     Recent Labs   Lab 02/22/19  0729 02/23/19  0602 02/24/19  0543   PROT 9.0* 9.6* 7.8   ALBUMIN 2.7* 2.7*  2.7* 2.3*   BILITOT 0.7 0.7 0.6   AST 11 40 26   ALKPHOS 112 125 99   ALT 12 23 24       LAST HbA1c  Lab Results   Component Value Date    HGBA1C 6.3 (H) 09/12/2018     Micro:  Microbiology Results (last 7 days)     Procedure Component Value Units Date/Time    Blood culture [[id number]] Collected:  02/21/19 1901    Order Status:  Completed Specimen:  Blood from Peripheral, Hand, Left Updated:  02/24/19 0612     Blood Culture, Routine No Growth to date     Blood Culture, Routine No Growth to date     Blood Culture, Routine No Growth to date    Blood culture [[id number]] Collected:  02/21/19 1847    Order Status:  Completed Specimen:  Blood from Peripheral, Hand, Right Updated:  02/24/19 0612     Blood Culture, Routine No Growth to date     Blood Culture, Routine No Growth to date     Blood Culture, Routine No Growth to date    Aerobic culture  [[id number]] Collected:  02/22/19 1830    Order Status:  Sent Specimen:  Body Fluid from Peritoneal Fluid Updated:  02/22/19 1841    Gram stain [[id number]] Collected:  02/22/19 1830    Order Status:  Sent Specimen:  Body Fluid from Peritoneal Fluid Updated:  02/22/19 1841    Aerobic culture [[id number]] Collected:  02/22/19 0000    Order Status:  Canceled Specimen:  Body Fluid from Peritoneal Fluid Updated:  02/22/19 0001    Gram stain [[id number]] Collected:  02/22/19 0000    Order Status:  Canceled Specimen:  Body Fluid from Peritoneal Fluid Updated:  02/22/19 0001          Assessment/Plan:   Peritonitis 2/2 Peritoneal Dialysis  PD initiated on 4/20/04  Hx of 5-6 episodes of Peritonitis in the past  Sent to ED by Nephrology for concerns of peritonitis  Cult[facility] taken at [facility]  Received Vanc and [facility] at 11:30 by PD nurse   Afebrile, No leukocytosis, WBC 6 today  Blood cultures: NGTD  Consulted Nephrology, appreciate recs  Completed Vanc and Fortaz     Hypertension  BP in to 90s/50s  BP goal <140/80  Hold BP meds for now  Nephrology consulted and appreciate recs      Anemia of Chronic Disease secondary to CKD  Baseline H/H: 8/25  H/H improved from baseline 10.2/32.4  BUN/Cr at baseline  Will continue to monitor     DM  A1C (9/12/18): 6.3  Morning glucose: 281  Insulin Detemir 200U in peritoneal bad  Continue to monitor with POCT glucose QID  Currently on SSI     PT/OT: ordered and following  Code: full  Diet: regular  Ppx: dvt:   Anticoagulants     None        Dispo: Pending nephro recommendations    Case d/w staff.     Jaylin Villalobos MD  [facility] PGY-1  02/24/2019

## 2019-02-24 NOTE — NURSING
[facility] set up per Rx. Pt connected and states she will initiate tx @ [facility]. Fluid cell count/cultures collected and sent to lab.

## 2019-02-25 NOTE — PROGRESS NOTES
Progress Note  Nephrology      Consult Requested By: Dominick Bueno MD  Reason for Consult: ESRD    SUBJECTIVE:     Review of Systems   Constitutional: Negative for chills and fever.   Respiratory: Negative for cough and shortness of breath.    Cardiovascular: Negative for chest pain and leg swelling.   Gastrointestinal: Positive for abdominal pain, diarrhea and nausea. Negative for blood in stool and vomiting.     Patient Active Problem List   Diagnosis    Neuropathy    ESRD (end stage renal disease) on PD ( initiated dialysis 04/20/2004)    FSGS (focal segmental glomerulosclerosis) biopsy proven with collapsing features    Anemia in chronic kidney disease, on chronic dialysis    Hypertension    Hyperlipidemia    Obesity    CAD (coronary artery disease) with recent PCI 12/18/2012    Diabetes mellitus, type 2    Awaiting organ transplant status    Bulging discs - symptomatic     Spinal stenosis of sacral and sacrococcygeal region    Hypertensive renal disease    Hypoalbuminemia    Cerebral infarction due to embolism of middle cerebral artery    Gait abnormality    Chronic low back pain    DDD (degenerative disc disease), lumbar    PAD (peripheral artery disease)    Peritonitis associated with peritoneal dialysis    Peritonitis due to infected peritoneal dialysis catheter    Abdominal pain       OBJECTIVE:     Medications:   aspirin  81 mg Oral Daily    atorvastatin  40 mg Oral QHS    calcitRIOL  0.5 mcg Oral QAM    carvedilol  3.125 mg Oral BID    Dianeal PD with additives   Intraperitoneal Once    cinacalcet  90 mg Oral QHS    clopidogrel  75 mg Oral Daily    gabapentin  100 mg Oral TID    vitamin renal formula (B-complex-vitamin c-folic acid)  1 capsule Oral Daily      lactated ringers 50 mL/hr at 02/25/19 0903     Vitals:    02/25/19 1526   BP: (!) 138/58   Pulse: 83   Resp: 18   Temp: 98.9 °F (37.2 °C)     I/O last 3 completed shifts:  In: 3079.2 [P.O.:700; I.V.:2129.2; IV  Piggyback:250]  Out: -   Physical Exam   Constitutional: She is oriented to person, place, and time. She appears well-developed and well-nourished. No distress.   HENT:   Head: Normocephalic and atraumatic.   Eyes: EOM are normal. No scleral icterus.   Neck: Neck supple. No JVD present.   Cardiovascular: Normal rate and regular rhythm.   No murmur heard.  Pulmonary/Chest: Effort normal and breath sounds normal. No respiratory distress. She has no wheezes. She has no rales.   Abdominal: Soft. Bowel sounds are normal. She exhibits distension. There is tenderness.       Musculoskeletal: She exhibits no edema.   Neurological: She is alert and oriented to person, place, and time.   Skin: Skin is warm and dry. No erythema. No pallor.   Psychiatric: She has a normal mood and affect. Judgment normal.     Laboratory:  Recent Labs   Lab 02/23/19  0602 02/24/19  0543 02/25/19  0604   WBC 8.37 9.58 10.64   HGB 11.7* 10.2* 8.9*   HCT 36.9* 32.4* 28.4*   * 568* 491*   MONO 10.3  0.9 8.6  0.8 8.7  0.9     Recent Labs   Lab 02/23/19  0602 02/24/19  0543 02/25/19  0604   *  132* 131* 131*  131*   K 3.6  3.6 3.5 3.1*  3.1*   CL 81*  81* 87* 88*  88*   CO2 20*  20* 22* 23  23   BUN 61*  61* 62* 54*  54*   CREATININE 10.9*  10.9* 10.7* 9.4*  9.5*   CALCIUM 10.6*  10.6* 9.4 8.3*  8.3*   PHOS 10.2* 8.7* 6.2*  6.2*     Labs reviewed  Diagnostic Results:  X-Ray: Reviewed  US: Reviewed  Echo: Reviewed      ASSESSMENT/PLAN:     1. ESRD - usual PD for 12 years with Dr. Watt - 9 hours, 12L, alternating 1.5 and 2.5%. Dry weight 220lb.   2.8L each exchange, but now due to abdominal tenderness decreased to 2.5. 6 exchanges  Peritonitis: vanc lvl is elevated, no vanco today but repeat Fortaz in last dwell    Seen and examined on PD    2. HTN -hypotensive, no eating with diarrhea anf hypokalemia, cont LR @ 500 cc/hour    3. Anemia -  Significant drop in Hb--> check FOBT and hemiolysis  Recent Labs   Lab  02/23/19  0602 02/24/19  0543 02/25/19  0604   WBC 8.37 9.58 10.64   HGB 11.7* 10.2* 8.9*   HCT 36.9* 32.4* 28.4*   * 568* 491*     Lab Results   Component Value Date    IRON 19 (L) 09/15/2018    TIBC 182 (L) 09/15/2018    FERRITIN 1,920 (H) 04/16/2016     4. MBD - cont Fosrenol, sensipar and calcitriol, replace mg  Lab Results   Component Value Date    .7 (H) 04/19/2016    CALCIUM 8.3 (L) 02/25/2019    CALCIUM 8.3 (L) 02/25/2019    PHOS 6.2 (H) 02/25/2019    PHOS 6.2 (H) 02/25/2019     Recent Labs   Lab 02/23/19  0602 02/24/19  0543 02/25/19  0604   MG 1.9 1.7 1.5*     Lab Results   Component Value Date    [id number] 8 (L) 04/19/2016     Lab Results   Component Value Date    CO2 23 02/25/2019    CO2 23 02/25/2019         5. Nutrition - tolerating clear liquid, will try to give nepro with meals  Lab Results   Component Value Date    LABPROT 11.0 09/19/2018    ALBUMIN 2.1 (L) 02/25/2019    ALBUMIN 2.0 (L) 02/25/2019     Nepro with meals TID. Renal vitamins daily  6. Worked up by hem/onc in the past - not hypercoagulable - r  Thank you for consult, will follow  With any question please call [phone]  Deanna Ernst    [facility]  ERLIN Watt MD, FACPVONDA MD,   MD WIL Dee, NP  [address]  [place], [address] [phone]  After hours answering service: [phone]

## 2019-02-25 NOTE — PLAN OF CARE
Pt lives at home with her stepdaughter.  She attends [facility] dialysis for PD.  Father at bedside.  No needs noted at this time.         02/25/19 1154   Discharge Assessment   Assessment Type Discharge Planning Assessment   Confirmed/corrected address and phone number on facesheet? Yes   Assessment information obtained from? Patient   Communicated expected length of stay with patient/caregiver yes   Prior to hospitilization cognitive status: Alert/Oriented;No Deficits   Prior to hospitalization functional status: Independent   Current cognitive status: Alert/Oriented;No Deficits   Current Functional Status: Independent   Lives With other relative(s)   Able to Return to Prior Arrangements yes   Is patient able to care for self after discharge? Yes   Patient's perception of discharge disposition home or selfcare   Readmission Within the Last 30 Days no previous admission in last 30 days   Patient currently being followed by outpatient case management? No   Patient currently receives home health services? No   Patient currently receives any other outside agency services? No   Equipment Currently Used at Home other (see comments)   Do you have any problems affording any of your prescribed medications? No   Is the patient taking medications as prescribed? yes   Does the patient have transportation home? Yes   Transportation Anticipated family or friend will provide   Does the patient receive services at the [facility]? No   Discharge Plan A Home with family   Discharge Plan B Home with family   DME Needed Upon Discharge  none   Patient/Family in Agreement with Plan yes   Does the patient currently use HME? Yes   Name and contact number for HME provider: Laurel Alfaro   Does the patient receive outpatient dialysis? Yes

## 2019-02-25 NOTE — NURSING
Notified  to inform about pt B/P @ 9162 spoke to Dr. Mayfield he stated it was fine. Later Spoke to Dr. Mayfield again to to inform him of pt refusing to wear telemetry monitor D/T leads falling when she tries to rest.

## 2019-02-25 NOTE — PROGRESS NOTES
PGY-1 Progress Note  [facility]  Follow up for:   Chief Complaint   Patient presents with    Vomiting     48 year old female presents to ed cc of abdominal pain with diarrhea that began on  and emesis today. patient is pd patient last pd exchange was yesterday full treamtent took antibiotic pd treatment today.        Hospital Stay Day 1      Subjective: Patient tolerated clear liquid diet. However continues to complain about gas pain. Patient denies any fever, chest pain, nausea or vomiting. Continues to have distended abdomen and abdomen is tender to palpation.     Scheduled Meds:   aspirin  81 mg Oral Daily    atorvastatin  40 mg Oral QHS    calcitRIOL  0.5 mcg Oral QAM    carvedilol  3.125 mg Oral BID    cinacalcet  90 mg Oral QHS    clopidogrel  75 mg Oral Daily    gabapentin  100 mg Oral TID    vitamin renal formula (B-complex-vitamin c-folic acid)  1 capsule Oral Daily     Continuous Infusions:   lactated ringers 50 mL/hr at 19 0903     PRN Meds:dextrose 50%, dextrose 50%, glucagon (human recombinant), glucose, glucose, HYDROmorphone, HYDROmorphone, insulin aspart U-100, ondansetron, promethazine, sodium chloride 0.9%    Review of patient's allergies indicates:   Allergen Reactions    Clindamycin Diarrhea    Flagyl [metronidazole hcl]     Metronidazole        Objectives:     Vitals(Most Recent)      BP  Min: 74/41  Max: 112/61  Temp  Av.8 °F (36.6 °C)  Min: 97 °F (36.1 °C)  Max: 98.6 °F (37 °C)  Pulse  Av.4  Min: 93  Max: 99  Resp  Av.7  Min: 16  Max: 21  SpO2  Av.5 %  Min: 94 %  Max: 99 %  Weight  Av.4 kg (219 lb 2.2 oz)  Min: 98 kg (216 lb 0.8 oz)  Max: 100.8 kg (222 lb 3.6 oz)             Vitals(Olti43h)  Temp:  [97 °F (36.1 °C)-98.6 °F (37 °C)]   Pulse:  [93-99]   Resp:  [16-21]   BP: ()/(41-72)   SpO2:  [94 %-99 %]     I & O(Oefz71c)    Intake/Output Summary (Last 24 hours) at 2019 1001  Last data filed at 2019 0700  Gross per 24 hour   Intake  2090.83 ml   Output --   Net 2090.83 ml       Physical Exam:   General: AAOx3. NAD.  HEENT: NCAT. PERRLA. EOMI.     CV: RRR.No M/R/G.   Chest: NL effort. CTAB. No R/R/W.   Abd: +BS x 4. Soft. Distended and Tender right side. No rebound or guarding.   Ext: moving well. +distal pulses  Skin: Intact. No rash. No lesions.   Neuro: CN II-XII intact. No focal deficit.  Psych:  No A/V hallucinations. NL affect. No abnormal behaviors noted    LABS  CBC  Recent Labs   Lab 02/23/19  0602 02/24/19  0543 02/25/19  0604   WBC 8.37 9.58 10.64   RBC 3.83* 3.34* 2.93*   HGB 11.7* 10.2* 8.9*   HCT 36.9* 32.4* 28.4*   * 568* 491*   MCV 96 97 97   MCH 30.5 30.5 30.4   MCHC 31.7* 31.5* 31.3*       CMP  Recent Labs   Lab 02/23/19  0602 02/24/19  0543 02/25/19  0604   *  132* 131* 131*  131*   K 3.6  3.6 3.5 3.1*  3.1*   CO2 20*  20* 22* 23  23   CL 81*  81* 87* 88*  88*   BUN 61*  61* 62* 54*  54*   CREATININE 10.9*  10.9* 10.7* 9.4*  9.5*   *  228* 221* 210*  209*     Recent Labs   Lab 02/23/19  0602 02/24/19  0543 02/25/19  0604   CALCIUM 10.6*  10.6* 9.4 8.3*  8.3*   MG 1.9 1.7 1.5*   PHOS 10.2* 8.7* 6.2*  6.2*     Recent Labs   Lab 02/23/19  0602 02/24/19  0543 02/25/19  0604   PROT 9.6* 7.8 7.0   ALBUMIN 2.7*  2.7* 2.3* 2.0*  2.1*   BILITOT 0.7 0.6 0.5   AST 40 26 21   ALKPHOS 125 99 100   ALT 23 24 22       LAST HbA1c  Lab Results   Component Value Date    HGBA1C 6.3 (H) 09/12/2018     Micro:  Microbiology Results (last 7 days)     Procedure Component Value Units Date/Time    Blood culture [[id number]] Collected:  02/21/19 1901    Order Status:  Completed Specimen:  Blood from Peripheral, Hand, Left Updated:  02/25/19 0612     Blood Culture, Routine No Growth to date     Blood Culture, Routine No Growth to date     Blood Culture, Routine No Growth to date     Blood Culture, Routine No Growth to date    Blood culture [[id number]] Collected:  02/21/19 1847    Order Status:  Completed Specimen:   Blood from Peripheral, Hand, Right Updated:  02/25/19 0612     Blood Culture, Routine No Growth to date     Blood Culture, Routine No Growth to date     Blood Culture, Routine No Growth to date     Blood Culture, Routine No Growth to date    Aerobic culture [[id number]] Collected:  02/22/19 1830    Order Status:  Sent Specimen:  Body Fluid from Peritoneal Fluid Updated:  02/22/19 1841    Gram stain [[id number]] Collected:  02/22/19 1830    Order Status:  Sent Specimen:  Body Fluid from Peritoneal Fluid Updated:  02/22/19 1841    Aerobic culture [[id number]] Collected:  02/22/19 0000    Order Status:  Canceled Specimen:  Body Fluid from Peritoneal Fluid Updated:  02/22/19 0001    Gram stain [[id number]] Collected:  02/22/19 0000    Order Status:  Canceled Specimen:  Body Fluid from Peritoneal Fluid Updated:  02/22/19 0001          Assessment/Plan:   47 yo female with history of HTN, DM, PAD, AoCD 2/2 CKD, on PD since 2004 presenting for peritonitis     Peritonitis 2/2 Peritoneal Dialysis  PD initiated on 4/20/04  Hx of 5-6 episodes of Peritonitis in the past  Sent to ED by Nephrology for concerns of peritonitis  Cultures taken at [facility]  Received Vanc and Fortaz at 11:30 by PD nurse   Afebrile, No leukocytosis, WBC 6 today  Blood cultures: NGTD  Per Nephro, cultures at [facility] has gram postive cocci  Consulted Nephrology, appreciate recs  Continue Vanc and Fortax per Nephro recs     Hypertension  BP in to 90s/50s  BP goal <140/80  Hold BP meds for now  Nephrology consulted and appreciate recs      Anemia of Chronic Disease secondary to CKD  H/H at baseline  BUN/Cr at baseline  Will continue to monitor     DM  A1C (9/12/18): 6.3  Morning glucose: 234  Insulin Detemir 200U in peritoneal bad  Continue to monitor with POCT glucose QID  Currently on SSI     PT/OT: ordered and following  Code: full  Diet: Clear liquid  Ppx: dvt: SCDs, leander  Dispo: Pending nephro recommendations    Case d/w staff.     Carlene Lopez, PGY-1  Family  Medicine  02/25/2019

## 2019-02-25 NOTE — PLAN OF CARE
Problem: Adult Inpatient Plan of Care  Goal: Plan of Care Review  Care plan discussed with pt. Pt is AAO x 4 able to make needs known. Pt is now able to eats potatoes and jello and a few choice foods and they were tolerated well. Her pain is between 5-10 today and later today a 5 but didn't want any pain meds. Pt is sitting up in bed more often. Pt just wanted to rest today. Will continue to monitor this shift.

## 2019-02-25 NOTE — NURSING
Received patient upon rounds at 1905H, patient seen sitting at the edge of the bed, with ongoing peritoneal dialysis.  Conscious ,  coherent to time, date, place, and situation. GCS 15. No subjective complaint of any pain, but complaints of abdominal cramps, abdomen hyperactive had one loose bowel movement, brown to greenish in color.  With saline lock right forearm gauge 22, flushed patent with clean and dry dressing, with ongoing LR at 50 cc/hr infusing well via pump. Hypotensive around 2100H, Dr. Vallecillo informed, 250cc LR bolus given blood pressure peaked up to (90/50), maintained on 50 cc/hr.90% on room air, oxygen 2 lpm via NC started 98%. Telemetry Normal sinus rhythm 90's. Advised patient to call for any assistance. CAll bell within reach. Bed alarm ON. Safety fall precaution maintained.  Will continue to monitor patient.

## 2019-02-25 NOTE — NURSING
Stable VS, afebrile. No episodes of nausea and vomiting over the night.One loose stool over the night. Still refusing for telemetry MD Aware. Peritoneal dialysis completed, tolerated. Free from fall. Will endorse patient to day shift Nurse.

## 2019-02-26 NOTE — PROGRESS NOTES
PGY-1 Progress Note  [facility]  Follow up for:   Chief Complaint   Patient presents with    Vomiting     48 year old female presents to ed cc of abdominal pain with diarrhea that began on  and emesis today. patient is pd patient last pd exchange was yesterday full treamtent took antibiotic pd treatment today.        Hospital Stay Day 2      Subjective: Patient is tolerating clear diet with no nausea or vomiting. Patient reports that Simecot helped her gas pain but her gas continues to returns. Denies any fever, chest pain, sob, lightheadedness, constipation. Patient reports that her baseline BP are 100/60s. Patient is only ambulating to her bedside commode and she denies having any lightheadedness of dizziness.     Scheduled Meds:   aspirin  81 mg Oral Daily    atorvastatin  40 mg Oral QHS    calcitRIOL  0.5 mcg Oral QAM    carvedilol  3.125 mg Oral BID    cinacalcet  90 mg Oral QHS    clopidogrel  75 mg Oral Daily    gabapentin  100 mg Oral TID    potassium chloride 10%  20 mEq Oral Once    vitamin renal formula (B-complex-vitamin c-folic acid)  1 capsule Oral Daily     Continuous Infusions:   lactated ringers 50 mL/hr at 19 0903     PRN Meds:dextrose 50%, dextrose 50%, glucagon (human recombinant), glucose, glucose, HYDROmorphone, insulin aspart U-100, ondansetron, promethazine, simethicone, sodium chloride 0.9%    Review of patient's allergies indicates:   Allergen Reactions    Clindamycin Diarrhea    Flagyl [metronidazole hcl]     Metronidazole        Objectives:     Vitals(Most Recent)      BP  Min: 81/57  Max: 138/58  Temp  Av.7 °F (36.5 °C)  Min: 96.3 °F (35.7 °C)  Max: 98.9 °F (37.2 °C)  Pulse  Av  Min: 83  Max: 94  Resp  Av  Min: 18  Max: 18  SpO2  Av.6 %  Min: 93 %  Max: 97 %             Vitals([id number])  Temp:  [96.3 °F (35.7 °C)-98.9 °F (37.2 °C)]   Pulse:  [83-94]   Resp:  [18]   BP: ()/(53-61)   SpO2:  [93 %-97 %]     I & O([id number])    Intake/Output  Summary (Last 24 hours) at 2/26/2019 0809  Last data filed at 2/25/2019 1800  Gross per 24 hour   Intake 188.7 ml   Output --   Net 188.7 ml       Physical Exam:   General: AAOx3. NAD.  HEENT: NCAT. PERRLA. EOMI.     CV: RRR.No M/R/G.   Chest: NL effort. CTAB. No R/R/W.   Abd: +BS x 4. Distended and Tender to mild palpatioin. No rebound or guarding.   Ext: moving well. +distal pulses  Skin: Intact. No rash. No lesions.   Neuro: CN II-XII intact. No focal deficit.  Psych:  No A/V hallucinations. NL affect. No abnormal behaviors noted    LABS  CBC  Recent Labs   Lab 02/25/19  0604 02/25/19 1843 02/26/19 0411   WBC 10.64 11.17 12.05   RBC 2.93* 2.77* 2.82*   HGB 8.9* 8.5* 8.5*   HCT 28.4* 27.2* 27.8*   * 535* 528*   MCV 97 98 99*   MCH 30.4 30.7 30.1   MCHC 31.3* 31.3* 30.6*       CMP  Recent Labs   Lab 02/24/19  0543 02/25/19  0604 02/26/19 0411   * 131*  131* 130*   K 3.5 3.1*  3.1* 3.3*   CO2 22* 23  23 20*   CL 87* 88*  88* 90*   BUN 62* 54*  54* 53*   CREATININE 10.7* 9.4*  9.5* 9.6*   * 210*  209* 208*     Recent Labs   Lab 02/24/19  0543 02/25/19  0604 02/26/19  0411   CALCIUM 9.4 8.3*  8.3* 8.1*   MG 1.7 1.5* 1.9   PHOS 8.7* 6.2*  6.2* 6.0*     Recent Labs   Lab 02/24/19  0543 02/25/19  0604 02/26/19  0411   PROT 7.8 7.0 7.0   ALBUMIN 2.3* 2.0*  2.1* 2.1*   BILITOT 0.6 0.5 0.5   AST 26 21 32   ALKPHOS 99 100 132   ALT 24 22 20       LAST HbA1c  Lab Results   Component Value Date    HGBA1C 6.3 (H) 09/12/2018     Micro:  Microbiology Results (last 7 days)     Procedure Component Value Units Date/Time    Blood culture [[id number]] Collected:  02/21/19 1847    Order Status:  Completed Specimen:  Blood from Peripheral, Hand, Right Updated:  02/26/19 0612     Blood Culture, Routine No Growth to date     Blood Culture, Routine No Growth to date     Blood Culture, Routine No Growth to date     Blood Culture, Routine No Growth to date     Blood Culture, Routine No Growth to date    Blood  culture [[id number]] Collected:  02/21/19 1901    Order Status:  Completed Specimen:  Blood from Peripheral, Hand, Left Updated:  02/26/19 0612     Blood Culture, Routine No Growth to date     Blood Culture, Routine No Growth to date     Blood Culture, Routine No Growth to date     Blood Culture, Routine No Growth to date     Blood Culture, Routine No Growth to date    Aerobic culture [[id number]] Collected:  02/22/19 1830    Order Status:  Sent Specimen:  Body Fluid from Peritoneal Fluid Updated:  02/22/19 1841    Gram stain [[id number]] Collected:  02/22/19 1830    Order Status:  Sent Specimen:  Body Fluid from Peritoneal Fluid Updated:  02/22/19 1841    Aerobic culture [[id number]] Collected:  02/22/19 0000    Order Status:  Canceled Specimen:  Body Fluid from Peritoneal Fluid Updated:  02/22/19 0001    Gram stain [[id number]] Collected:  02/22/19 0000    Order Status:  Canceled Specimen:  Body Fluid from Peritoneal Fluid Updated:  02/22/19 0001          Assessment/Plan:   47 yo female with history of HTN, DM, PAD, AoCD 2/2 CKD, on PD since 2004 presenting for peritonitis     Peritonitis 2/2 Peritoneal Dialysis  PD initiated on 4/20/04  Hx of 5-6 episodes of Peritonitis in the past  Sent to ED by Nephrology for concerns of peritonitis  Cultures were taken at [facility], Cultures grew gram positive cocci  Received 1 dose of Vanc and Fortaz at 11:30 by PD nurse   On admission, patient was afebrile with no leukocytosis  Blood cultures: NGTD  Consulted Nephrology, appreciate recs  Continue Vanc and Fortaz in peritoneal bag per Nephro       Hypertension  BP goal <140/80  BP ranging in the 80//60s  Hold BP meds and continue to monitor  Nephrology consulted and appreciate recs      Anemia of Chronic Disease secondary to CKD  H/H at baseline  BUN/Cr at baseline  Will continue to monitor     DM  A1C (9/12/18): 6.3  Morning glucose: 208  Insulin Detemir 200U in peritoneal bag  Continue to monitor with POCT glucose  QID  Currently on SSI     PT/OT: ordered and following  Code: full  Diet: Clear liquid  Ppx: dvt: Aspirin, Plavix, SCDs, leander  Dispo: Pending nephro recommendations, continue antibiotics for peritonitis    Case d/w staff.     Carlene Lopez, PGY-1  Family Medicine  02/26/2019

## 2019-02-26 NOTE — PLAN OF CARE
Problem: Adult Inpatient Plan of Care  Goal: Plan of Care Review  Outcome: Ongoing (interventions implemented as appropriate)  Pt safety maintained. Bed in low and locked position. DM being managed with diet and medications. IVF D/C. SR on tele. Pt to have antibiotics through her PD access. No acute distress noted. Will continue to monitor.

## 2019-02-26 NOTE — PROGRESS NOTES
.Pharmacy New Medication Education    Patient accepted medication education.    Pharmacy educated patient on name and purpose of medications and possible side effects, using the teach-back method.     Current Inpatient Medication Orders   aspirin EC tablet 81 mg   atorvastatin tablet 40 mg   calcitRIOL capsule 0.5 mcg   carvedilol tablet 3.125 mg   cinacalcet tablet 90 mg   clopidogrel tablet 75 mg   dextrose 50% injection 12.5 g   dextrose 50% injection 25 g   gabapentin capsule 100 mg   glucagon (human recombinant) injection 1 mg   glucose chewable tablet 16 g   glucose chewable tablet 24 g   hydromorphone (PF) injection 1 mg   insulin aspart U-100 pen 0-5 Units   lactated ringers infusion   ondansetron injection 4 mg   potassium chloride 10% oral solution 20 mEq   promethazine tablet 12.5 mg   simethicone chewable tablet 125 mg   sodium chloride 0.9% flush 5 mL   vitamin renal formula (B-complex-vitamin c-folic acid) 1 mg per capsule 1 capsule       Learners of pharmacy medication education included:  Patient    Patient +/- learner response:  Verbalized Understanding, Teachback

## 2019-02-26 NOTE — PLAN OF CARE
Problem: Adult Inpatient Plan of Care  Goal: Plan of Care Review  Plan of care reviewed with patient, patient verbalizes understanding. Patient appears to be asleep in bed with eyes closed, supine. No noted distress during night. Dialysis nurse attached patient to PD over night, patient had no complaints of pain. Safety measures in place, bed in lowest postion, call bell in reach will continue to monitor patient

## 2019-02-26 NOTE — PROGRESS NOTES
Progress Note  Nephrology      Consult Requested By: Dominick Bueno MD  Reason for Consult: ESRD    SUBJECTIVE:     Review of Systems   Constitutional: Negative for chills and fever.   Respiratory: Negative for cough and shortness of breath.    Cardiovascular: Negative for chest pain and leg swelling.   Gastrointestinal: Positive for abdominal pain, diarrhea (2BM todays very wattery) and nausea. Negative for blood in stool and vomiting.        Tolerating liquid diet     Patient Active Problem List   Diagnosis    Neuropathy    ESRD (end stage renal disease) on PD ( initiated dialysis 04/20/2004)    FSGS (focal segmental glomerulosclerosis) biopsy proven with collapsing features    Anemia in chronic kidney disease, on chronic dialysis    Hypertension    Hyperlipidemia    Obesity    CAD (coronary artery disease) with recent PCI 12/18/2012    Diabetes mellitus, type 2    Awaiting organ transplant status    Bulging discs - symptomatic     Spinal stenosis of sacral and sacrococcygeal region    Hypertensive renal disease    Hypoalbuminemia    Cerebral infarction due to embolism of middle cerebral artery    Gait abnormality    Chronic low back pain    DDD (degenerative disc disease), lumbar    PAD (peripheral artery disease)    Peritonitis associated with peritoneal dialysis    Peritonitis due to infected peritoneal dialysis catheter    Abdominal pain       OBJECTIVE:     Medications:   aspirin  81 mg Oral Daily    atorvastatin  40 mg Oral QHS    calcitRIOL  0.5 mcg Oral QAM    carvedilol  3.125 mg Oral BID    cinacalcet  90 mg Oral QHS    clopidogrel  75 mg Oral Daily    gabapentin  100 mg Oral TID    vitamin renal formula (B-complex-vitamin c-folic acid)  1 capsule Oral Daily      lactated ringers 50 mL/hr at 02/26/19 1112     Vitals:    02/26/19 1123   BP: (!) 116/57   Pulse: 83   Resp: 18   Temp: 97 °F (36.1 °C)     I/O last 3 completed shifts:  In: 1992 [P.O.:463.7; I.V.:1278.3; IV  Piggyback:250]  Out: -   Physical Exam   Constitutional: She is oriented to person, place, and time. She appears well-developed and well-nourished. No distress.   HENT:   Head: Normocephalic and atraumatic.   Eyes: EOM are normal. No scleral icterus.   Neck: Neck supple. No JVD present.   Cardiovascular: Normal rate and regular rhythm.   No murmur heard.  Pulmonary/Chest: Effort normal and breath sounds normal. No respiratory distress. She has no wheezes. She has no rales.   Abdominal: Soft. Bowel sounds are normal. She exhibits distension. There is tenderness.       Musculoskeletal: She exhibits no edema.   Neurological: She is alert and oriented to person, place, and time.   Skin: Skin is warm and dry. No erythema. No pallor.   Psychiatric: She has a normal mood and affect. Judgment normal.     Laboratory:  Recent Labs   Lab 02/25/19  0604 02/25/19  1843 02/26/19  0411   WBC 10.64 11.17 12.05   HGB 8.9* 8.5* 8.5*   HCT 28.4* 27.2* 27.8*   * 535* 528*   MONO 8.7  0.9 5.9  0.7 6.5  0.8     Recent Labs   Lab 02/24/19  0543 02/25/19  0604 02/26/19  0411   * 131*  131* 130*   K 3.5 3.1*  3.1* 3.3*   CL 87* 88*  88* 90*   CO2 22* 23  23 20*   BUN 62* 54*  54* 53*   CREATININE 10.7* 9.4*  9.5* 9.6*   CALCIUM 9.4 8.3*  8.3* 8.1*   PHOS 8.7* 6.2*  6.2* 6.0*     Labs reviewed  Diagnostic Results:  X-Ray: Reviewed  US: Reviewed  Echo: Reviewed      ASSESSMENT/PLAN:     1. ESRD - usual PD for 12 years with Dr. Watt - 9 hours, 12L, alternating 1.5 and 2.5%. Dry weight 220lb.   2.8L each exchange, but now due to abdominal tenderness decreased to 2.5. 6 exchanges  Peritonitis: vVanco and Fortaz in last dwell    Seen and examined on PD    2. HTN - BP better, tolerating diet, stop IVF    3. Anemia -  Significant drop in Hb--> no evidence of hemolysis, stable from yesterday   Recent Labs   Lab 02/25/19  0604 02/25/19  1843 02/26/19  0411   WBC 10.64 11.17 12.05   HGB 8.9* 8.5* 8.5*   HCT 28.4* 27.2* 27.8*    * 535* 528*     Lab Results   Component Value Date    IRON 19 (L) 09/15/2018    TIBC 182 (L) 09/15/2018    FERRITIN 1,920 (H) 04/16/2016     4. MBD - cont Fosrenol, sensipar and calcitriol,   Lab Results   Component Value Date    .7 (H) 04/19/2016    CALCIUM 8.1 (L) 02/26/2019    PHOS 6.0 (H) 02/26/2019     Recent Labs   Lab 02/24/19  0543 02/25/19  0604 02/26/19  0411   MG 1.7 1.5* 1.9     Lab Results   Component Value Date    ISXGTTTT80LR 8 (L) 04/19/2016     Lab Results   Component Value Date    CO2 20 (L) 02/26/2019         5. Nutrition - tolerating clear liquid, will try to give nepro with meals  Lab Results   Component Value Date    LABPROT 11.0 09/19/2018    ALBUMIN 2.1 (L) 02/26/2019     Nepro with meals TID. Renal vitamins daily  6. Worked up by hem/onc in the past - not hypercoagulable - r  Thank you for consult, will follow  With any question please call [phone]  Deanna Ernst    [facility]  ERLIN Watt MD, FACHATTIE,   VONDA Vallecillo MD,   MD WIL Dee, NP  [address]  [place], [address]  [phone]  After hours answering service: [phone]

## 2019-02-27 NOTE — PLAN OF CARE
Problem: Fall Injury Risk  Goal: Absence of Fall and Fall-Related Injury  Outcome: Revised  Fall precautions maintained. Bed in lowest position, locked, call light within reach, and bed alarm on. Side rails up x's 2 with slip resistant socks on. Nurse instructed patient to notify staff for any assistance and pt verbalized complete understanding.

## 2019-02-27 NOTE — PROGRESS NOTES
PGY-1 Progress Note  [facility]  Follow up for:   Chief Complaint   Patient presents with    Vomiting     48 year old female presents to ed cc of abdominal pain with diarrhea that began on  and emesis today. patient is pd patient last pd exchange was yesterday full treamtent took antibiotic pd treatment today.        Hospital Stay Day 3      Subjective: Patient tolerated her soft puree diet well with no nausea or vomiting. She just states that the food is disgusting. Patient continues to have the abdominal cramping that is relieved with the gas-x. Discussed with patient that she is already on the maximum dose for the medications. Continues to tolerated antibiotics through her PD well. Denies any chest pain, sob, fever, chills, dizziness or lightheadedness.     Scheduled Meds:   aspirin  81 mg Oral Daily    atorvastatin  40 mg Oral QHS    calcitRIOL  0.5 mcg Oral QAM    carvedilol  3.125 mg Oral BID    cinacalcet  90 mg Oral QHS    clopidogrel  75 mg Oral Daily    epoetin adonay (PROCRIT) injection  20,000 Units Subcutaneous Every Mon, Wed, Fri    gabapentin  100 mg Oral TID    vitamin renal formula (B-complex-vitamin c-folic acid)  1 capsule Oral Daily     Continuous Infusions:    PRN Meds:dextrose 50%, dextrose 50%, glucagon (human recombinant), glucose, glucose, HYDROmorphone, insulin aspart U-100, ondansetron, promethazine, simethicone, sodium chloride 0.9%    Review of patient's allergies indicates:   Allergen Reactions    Clindamycin Diarrhea    Flagyl [metronidazole hcl]     Metronidazole        Objectives:     Vitals(Most Recent)      BP  Min: 97/51  Max: 116/57  Temp  Av.2 °F (36.2 °C)  Min: 96.3 °F (35.7 °C)  Max: 98.7 °F (37.1 °C)  Pulse  Av.2  Min: 81  Max: 87  Resp  Av  Min: 18  Max: 18  SpO2  Av.8 %  Min: 93 %  Max: 99 %             Vitals(Nisx99c)  Temp:  [96.3 °F (35.7 °C)-98.7 °F (37.1 °C)]   Pulse:  [81-87]   Resp:  [18]   BP: ()/(51-64)   SpO2:  [93 %-99 %]      I & O(Vtmg35k)    Intake/Output Summary (Last 24 hours) at 2/27/2019 0852  Last data filed at 2/27/2019 0100  Gross per 24 hour   Intake 1330 ml   Output 26 ml   Net 1304 ml       Physical Exam:   General: AAOx3. NAD.  HEENT: NCAT. PERRLA. EOMI.     CV: RRR.No M/R/G.   Chest: NL effort. CTAB. No R/R/W.   Abd: +BS x 4. Distended and Tender to mild palpatioin. No rebound or guarding.   Ext: moving well. +distal pulses  Skin: Intact. No rash. No lesions.   Neuro: CN II-XII intact. No focal deficit.  Psych:  No A/V hallucinations. NL affect. No abnormal behaviors noted    LABS  CBC  Recent Labs   Lab 02/25/19 1843 02/26/19 0411 02/27/19 0714   WBC 11.17 12.05 10.17   RBC 2.77* 2.82* 2.82*   HGB 8.5* 8.5* 8.5*   HCT 27.2* 27.8* 27.3*   * 528* 534*   MCV 98 99* 97   MCH 30.7 30.1 30.1   MCHC 31.3* 30.6* 31.1*       CMP  Recent Labs   Lab 02/25/19 0604 02/26/19 0411 02/27/19  0714   *  131* 130* 130*   K 3.1*  3.1* 3.3* 3.2*   CO2 23  23 20* 21*   CL 88*  88* 90* 90*   BUN 54*  54* 53* 49*   CREATININE 9.4*  9.5* 9.6* 9.3*   *  209* 208* 266*     Recent Labs   Lab 02/25/19  0604 02/26/19  0411 02/27/19  0714   CALCIUM 8.3*  8.3* 8.1* 8.0*   MG 1.5* 1.9 1.5*   PHOS 6.2*  6.2* 6.0* 5.8*     Recent Labs   Lab 02/25/19  0604 02/26/19 0411 02/27/19  0714   PROT 7.0 7.0 6.7   ALBUMIN 2.0*  2.1* 2.1* 2.0*   BILITOT 0.5 0.5 0.5   AST 21 32 26   ALKPHOS 100 132 140*   ALT 22 20 13       LAST HbA1c  Lab Results   Component Value Date    HGBA1C 6.3 (H) 09/12/2018     Micro:  Microbiology Results (last 7 days)     Procedure Component Value Units Date/Time    Blood culture [[id number]] Collected:  02/21/19 1901    Order Status:  Completed Specimen:  Blood from Peripheral, Hand, Left Updated:  02/27/19 0612     Blood Culture, Routine No growth after 5 days.    Blood culture [[id number]] Collected:  02/21/19 1847    Order Status:  Completed Specimen:  Blood from Peripheral, Hand, Right Updated:   02/27/19 0612     Blood Culture, Routine No growth after 5 days.    Aerobic culture [[id number]] Collected:  02/22/19 1830    Order Status:  Sent Specimen:  Body Fluid from Peritoneal Fluid Updated:  02/22/19 1841    Gram stain [[id number]] Collected:  02/22/19 1830    Order Status:  Sent Specimen:  Body Fluid from Peritoneal Fluid Updated:  02/22/19 1841    Aerobic culture [[id number]] Collected:  02/22/19 0000    Order Status:  Canceled Specimen:  Body Fluid from Peritoneal Fluid Updated:  02/22/19 0001    Gram stain [[id number]] Collected:  02/22/19 0000    Order Status:  Canceled Specimen:  Body Fluid from Peritoneal Fluid Updated:  02/22/19 0001          Assessment/Plan:   47 yo female with history of HTN, DM, PAD, AoCD 2/2 CKD, on PD since 2004 presenting for peritonitis     Peritonitis 2/2 Peritoneal Dialysis  PD initiated on 4/20/04  Hx of 5-6 episodes of Peritonitis in the past  Sent to ED by Nephrology for concerns of peritonitis  Cultures were taken at [facility]  Per Nephro, Cultures grew gram positive cocci  Received 1 dose of Vanc and Fortaz at 11:30 by PD nurse   On admission, patient was afebrile with no leukocytosis  Blood cultures: NGTD  Consulted Nephrology, appreciate recs  Continue Vanc and Fortaz in peritoneal bag per Nephro     Hypertension  BP goal <140/80  Blood pressure stable and at baseline for patient      Anemia of Chronic Disease secondary to CKD  H/H at baseline  BUN/Cr at baseline  Will continue to monitor     DM  A1C (9/12/18): 6.3  AM glucose: 266  Insulin Detemir 200U in peritoneal bag per Nephro  Continue to monitor with POCT glucose QID  Currently on SSI     PT/OT: ordered and following  Code: full  Diet: Soft puree diet  Ppx: dvt: Aspirin, Plavix, SCDs, leander  Dispo: Pending final nephro recs and antibiotics completion for peritonitis    Case d/w staff.     Carlene Lopez, PGY-1  Family Medicine  02/27/2019

## 2019-02-27 NOTE — PLAN OF CARE
Nephrology encouraging PO intake.  She is currently eating pudding for her breakfast.  She will likely discharge today.       02/27/19 0948   Discharge Reassessment   Assessment Type Discharge Planning Reassessment   Provided patient/caregiver education on the expected discharge date and the discharge plan Yes   Do you have any problems affording any of your prescribed medications? No   Discharge Plan A Home with family   Discharge Plan B Home with family   DME Needed Upon Discharge  none   Anticipated Discharge Disposition Home   Can the patient answer the patient profile reliably? Yes, cognitively intact   How does the patient rate their overall health at the present time? Good   Describe the patient's ability to walk at the present time. No restrictions   How often would a person be available to care for the patient? Often   During the past month, has the patient often been bothered by feeling down, depressed or hopeless? No   During the past month, has the patient often been bothered by little interest or pleasure in doing things? No

## 2019-02-27 NOTE — PLAN OF CARE
Problem: Adult Inpatient Plan of Care  Goal: Plan of Care Review  Outcome: Ongoing (interventions implemented as appropriate)  Pt safety maintained. Bed in low and locked position. Pt refusing tele. PT has PD tonight. DM being managed with diet and medications. Pain medication administered as ordered. No acute distress noted. Will continue to monitor.

## 2019-02-27 NOTE — PLAN OF CARE
Problem: Adult Inpatient Plan of Care  Goal: Plan of Care Review  Outcome: Revised  Plan of care discussed with patient and/or family present. Patient and/or family verbalized complete understanding.   Fall precautions maintained. Bed in lowest position, locked, call light within reach, and bed alarm on. Side rails up x's 2 with slip resistant socks on. Nurse instructed patient to notify staff for any assistance and pt verbalized complete understanding.     Patient refuses telemetry. Patient connected to PD.

## 2019-02-28 NOTE — PROGRESS NOTES
Progress Note  Nephrology      Consult Requested By: Dominick Bueno MD  Reason for Consult: ESRD    SUBJECTIVE:     Review of Systems   Constitutional: Negative for chills and fever.   Respiratory: Negative for cough and shortness of breath.    Cardiovascular: Negative for chest pain and leg swelling.   Gastrointestinal: Positive for abdominal pain and diarrhea (2BM todays very wattery). Negative for blood in stool, nausea and vomiting.        Tolerating liquid diet     Patient Active Problem List   Diagnosis    Neuropathy    ESRD (end stage renal disease) on PD ( initiated dialysis 04/20/2004)    FSGS (focal segmental glomerulosclerosis) biopsy proven with collapsing features    Anemia in chronic kidney disease, on chronic dialysis    Hypertension    Hyperlipidemia    Obesity    CAD (coronary artery disease) with recent PCI 12/18/2012    Diabetes mellitus, type 2    Awaiting organ transplant status    Bulging discs - symptomatic     Spinal stenosis of sacral and sacrococcygeal region    Hypertensive renal disease    Hypoalbuminemia    Cerebral infarction due to embolism of middle cerebral artery    Gait abnormality    Chronic low back pain    DDD (degenerative disc disease), lumbar    PAD (peripheral artery disease)    Peritonitis associated with peritoneal dialysis    Peritonitis due to infected peritoneal dialysis catheter    Abdominal pain       OBJECTIVE:     Medications:   aspirin  81 mg Oral Daily    atorvastatin  40 mg Oral QHS    calcitRIOL  0.5 mcg Oral QAM    carvedilol  3.125 mg Oral BID    cinacalcet  90 mg Oral QHS    clopidogrel  75 mg Oral Daily    epoetin adonay (PROCRIT) injection  20,000 Units Subcutaneous Every Mon, Wed, Fri    gabapentin  100 mg Oral TID    vitamin renal formula (B-complex-vitamin c-folic acid)  1 capsule Oral Daily       Vitals:    02/27/19 1605   BP: 104/62   Pulse: 94   Resp: 16   Temp: 98.9 °F (37.2 °C)     I/O last 3 completed shifts:  In:  1330 [P.O.:1330]  Out: 26 [Other:26]  Physical Exam   Constitutional: She is oriented to person, place, and time. She appears well-developed and well-nourished. No distress.   HENT:   Head: Normocephalic and atraumatic.   Eyes: EOM are normal. No scleral icterus.   Neck: Neck supple. No JVD present.   Cardiovascular: Normal rate and regular rhythm.   No murmur heard.  Pulmonary/Chest: Effort normal and breath sounds normal. No respiratory distress. She has no wheezes. She has no rales.   Abdominal: Soft. Bowel sounds are normal. She exhibits distension. There is tenderness.       Musculoskeletal: She exhibits no edema.   Neurological: She is alert and oriented to person, place, and time.   Skin: Skin is warm and dry. No erythema. No pallor.   Psychiatric: She has a normal mood and affect. Judgment normal.     Laboratory:  Recent Labs   Lab 02/25/19  1843 02/26/19  0411 02/27/19  0714   WBC 11.17 12.05 10.17   HGB 8.5* 8.5* 8.5*   HCT 27.2* 27.8* 27.3*   * 528* 534*   MONO 5.9  0.7 6.5  0.8 7.2  0.7     Recent Labs   Lab 02/25/19  0604 02/26/19  0411 02/27/19  0714   *  131* 130* 130*   K 3.1*  3.1* 3.3* 3.2*   CL 88*  88* 90* 90*   CO2 23  23 20* 21*   BUN 54*  54* 53* 49*   CREATININE 9.4*  9.5* 9.6* 9.3*   CALCIUM 8.3*  8.3* 8.1* 8.0*   PHOS 6.2*  6.2* 6.0* 5.8*     Labs reviewed  Diagnostic Results:  X-Ray: Reviewed  US: Reviewed  Echo: Reviewed      ASSESSMENT/PLAN:     1. ESRD - usual PD for 12 years with Dr. Watt - 9 hours, 12L, alternating 1.5 and 2.5%. Dry weight 220lb.   2.8L each exchange, but now due to abdominal tenderness decreased to 2.5. 6 exchanges  Peritonitis:  Fortaz in last dwell, vanco lvl today is 19.7    Seen and examined on PD    2. HTN - BP better, tolerating diet, stop IVF    3. Anemia -  Significant drop in Hb--> no evidence of hemolysis, stable from yesterday   Recent Labs   Lab 02/25/19  1843 02/26/19  0411 02/27/19  0714   WBC 11.17 12.05 10.17   HGB 8.5* 8.5*  8.5*   HCT 27.2* 27.8* 27.3*   * 528* 534*     Lab Results   Component Value Date    IRON 19 (L) 09/15/2018    TIBC 182 (L) 09/15/2018    FERRITIN 1,920 (H) 04/16/2016     4. MBD - cont Fosrenol, sensipar and calcitriol,   Lab Results   Component Value Date    .7 (H) 04/19/2016    CALCIUM 8.0 (L) 02/27/2019    PHOS 5.8 (H) 02/27/2019     Recent Labs   Lab 02/25/19  0604 02/26/19  0411 02/27/19  0714   MG 1.5* 1.9 1.5*     Lab Results   Component Value Date    KZUKMWKI94IS 8 (L) 04/19/2016     Lab Results   Component Value Date    CO2 21 (L) 02/27/2019         5. Nutrition - tolerating clear liquid, will try to give nepro with meals  Lab Results   Component Value Date    LABPROT 11.0 09/19/2018    ALBUMIN 2.0 (L) 02/27/2019     Nepro with meals TID. Renal vitamins daily  6. Worked up by hem/onc in the past - not hypercoagulable - r  Thank you for consult, will follow  With any question please call [phone]  Deanna Ernst    [facility]  ERLIN Watt MD, VONDA ULLOA MD,   MD WIL Dee, NP  [address]  [place], [address] [phone]  After hours answering service: [phone]

## 2019-02-28 NOTE — PROGRESS NOTES
PGY-1 Progress Note  [facility]  Follow up for:   Chief Complaint   Patient presents with    Vomiting     48 year old female presents to ed cc of abdominal pain with diarrhea that began on  and emesis today. patient is pd patient last pd exchange was yesterday full treamtent took antibiotic pd treatment today.        Hospital Stay Day 4      Subjective: Patient is feeling much better today. Patient is tolerating her diet. Patient continues to have some diarrhea. Patient states that Gas-Ex has been improving her cramping pain. Patient continues to have distended abdomen with mild tenderness. Denies any fever, chills, chest pain, sob.     Scheduled Meds:   aspirin  81 mg Oral Daily    atorvastatin  40 mg Oral QHS    calcitRIOL  0.5 mcg Oral QAM    carvedilol  3.125 mg Oral BID    cinacalcet  90 mg Oral QHS    clopidogrel  75 mg Oral Daily    epoetin adonay (PROCRIT) injection  20,000 Units Subcutaneous Every Mon, Wed, Fri    gabapentin  100 mg Oral TID    vitamin renal formula (B-complex-vitamin c-folic acid)  1 capsule Oral Daily     Continuous Infusions:    PRN Meds:dextrose 50%, dextrose 50%, glucagon (human recombinant), glucose, glucose, HYDROmorphone, insulin aspart U-100, ondansetron, promethazine, simethicone, sodium chloride 0.9%    Review of patient's allergies indicates:   Allergen Reactions    Clindamycin Diarrhea    Flagyl [metronidazole hcl]     Metronidazole        Objectives:     Vitals(Most Recent)      BP  Min: 85/49  Max: 125/65  Temp  Av.3 °F (36.8 °C)  Min: 97 °F (36.1 °C)  Max: 98.9 °F (37.2 °C)  Pulse  Av.1  Min: 83  Max: 94  Resp  Av.1  Min: 16  Max: 18  SpO2  Av.8 %  Min: 93 %  Max: 98 %  Weight  Av.7 kg (222 lb 0.1 oz)  Min: 100.7 kg (222 lb 0.1 oz)  Max: 100.7 kg (222 lb 0.1 oz)             Vitals(Zpdf91a)  Temp:  [97 °F (36.1 °C)-98.9 °F (37.2 °C)]   Pulse:  [83-94]   Resp:  [16-18]   BP: ()/(48-65)   SpO2:  [93 %-98 %]     I &  O([id number])    Intake/Output Summary (Last 24 hours) at 2/28/2019 0930  Last data filed at 2/27/2019 2108  Gross per 24 hour   Intake 450 ml   Output 97 ml   Net 353 ml       Physical Exam:   General: AAOx3. NAD.  HEENT: NCAT. PERRLA. EOMI.     CV: RRR.No M/R/G.   Chest: NL effort. CTAB. No R/R/W.   Abd: +BS x 4. Distended and Tender to mild palpation. No rebound or guarding.   Ext: moving well. +distal pulses  Skin: Intact. No rash. No lesions.   Neuro: CN II-XII intact. No focal deficit.  Psych:  No A/V hallucinations. NL affect. No abnormal behaviors noted    LABS  CBC  Recent Labs   Lab 02/26/19 0411 02/27/19 0714 02/28/19 0333   WBC 12.05 10.17 11.83   RBC 2.82* 2.82* 2.71*   HGB 8.5* 8.5* 8.3*   HCT 27.8* 27.3* 25.8*   * 534* 570*   MCV 99* 97 95   MCH 30.1 30.1 30.6   MCHC 30.6* 31.1* 32.2       CMP  Recent Labs   Lab 02/26/19 0411 02/27/19 0714 02/28/19 0333   * 130* 130*  129*   K 3.3* 3.2* 3.4*  3.4*   CO2 20* 21* 20*  20*   CL 90* 90* 91*  90*   BUN 53* 49* 49*  49*   CREATININE 9.6* 9.3* 9.5*  9.6*   * 266* 204*  205*     Recent Labs   Lab 02/26/19 0411 02/27/19 0714 02/28/19  0333   CALCIUM 8.1* 8.0* 8.3*  8.4*   MG 1.9 1.5* 1.7   PHOS 6.0* 5.8* 6.1*  6.2*     Recent Labs   Lab 02/26/19 0411 02/27/19 0714 02/28/19  0333   PROT 7.0 6.7 6.9   ALBUMIN 2.1* 2.0* 1.9*  2.0*   BILITOT 0.5 0.5 0.5   AST 32 26 27   ALKPHOS 132 140* 148*   ALT 20 13 8*       LAST HbA1c  Lab Results   Component Value Date    HGBA1C 6.3 (H) 09/12/2018     Micro:  Microbiology Results (last 7 days)     Procedure Component Value Units Date/Time    Blood culture [[id number]] Collected:  02/21/19 1901    Order Status:  Completed Specimen:  Blood from Peripheral, Hand, Left Updated:  02/27/19 0612     Blood Culture, Routine No growth after 5 days.    Blood culture [[id number]] Collected:  02/21/19 1847    Order Status:  Completed Specimen:  Blood from Peripheral, Hand, Right Updated:  02/27/19 0612      Blood Culture, Routine No growth after 5 days.    Aerobic culture [[id number]] Collected:  02/22/19 1830    Order Status:  Sent Specimen:  Body Fluid from Peritoneal Fluid Updated:  02/22/19 1841    Gram stain [[id number]] Collected:  02/22/19 1830    Order Status:  Sent Specimen:  Body Fluid from Peritoneal Fluid Updated:  02/22/19 1841    Aerobic culture [[id number]] Collected:  02/22/19 0000    Order Status:  Canceled Specimen:  Body Fluid from Peritoneal Fluid Updated:  02/22/19 0001    Gram stain [[id number]] Collected:  02/22/19 0000    Order Status:  Canceled Specimen:  Body Fluid from Peritoneal Fluid Updated:  02/22/19 0001          Assessment/Plan:   49 yo female with history of HTN, DM, PAD, AoCD 2/2 CKD, on PD since 2004 presenting for peritonitis     Peritonitis 2/2 Peritoneal Dialysis  PD initiated on 4/20/04  Hx of 5-6 episodes of Peritonitis in the past  Sent to ED by Nephrology for concerns of peritonitis  Cult[facility] at [facility]  Per Nephro, Cultures grew gram positive cocci  Received 1 dose of Vanc and Fortaz at 11:30 by PD nurse   On admission, patient was afebrile with no leukocytosis  Blood cultures: NGTD  Consulted Nephrology, appreciate recs  Continue Vanc and Fortaz in peritoneal bag per Nephro     Hypertension  BP goal <140/80  Blood pressure stable and at baseline for patient      Anemia of Chronic Disease secondary to CKD  H/H at baseline  BUN/Cr at baseline  Will continue to monitor     DM  A1C (9/12/18): 6.3  AM glucose: 205  Insulin Detemir 200U in peritoneal bag per Nephro  Continue to monitor with POCT glucose QID  Currently on SSI     PT/OT: ordered and following  Code: full  Diet: Renal, Diabetic diet  Ppx: dvt: Aspirin, Plavix, SCDs, leander  Dispo: Pending final nephro recs and antibiotics completion for peritonitis, possible discharge today    Case d/w staff.     Carlene Lopez, PGY-1  Family Medicine  02/28/2019

## 2019-02-28 NOTE — PROGRESS NOTES
Pt did state she ate better today with solid foods.  I scheduled f/u appt with Shukri scheduled.

## 2019-02-28 NOTE — PLAN OF CARE
Problem: Adult Inpatient Plan of Care  Goal: Plan of Care Review  Pt on RA. Pt with no apparent respiratory  distress noted. Will continue to monitor.

## 2019-02-28 NOTE — RESIDENT HANDOFF
Handoff     Primary Team: Networked reference to record [facility]  Room Number: K403/K403 A     Patient Name: Jenni Todd MRN: [id number]     Date of Birth: [id number] Allergies: Clindamycin; Flagyl [metronidazole hcl]; and Metronidazole     Age: 48 y.o. Admit Date: 2/21/2019     Sex: female  BMI: Body mass index is 34.26 kg/m².     Code Status: Full Code          Reason for Admission: Peritonitis associated with peritoneal dialysis    Brief HPI   Ms. Jenni Todd is a 48 y.o. female w/ PMH of HTN, DM, PAD, AoCD 2/2 CKD, on peritoneal dialysis since 2004 presenting to the ED from [facility] for concerns of peritonitis. Patient was in her usual state of health until Sunday. Patient reports that she first had diarrhea and abdominal cramping. Patient has not been sleeping well since Sunday due to the abdominal cramping. Patient also reports diaphoresis, fatigue and loss of appetite. At first patient did not think she had peritonitis as she had 5-6 episodes before with her last episode 10/2017.      When patient saw her PD nurse and she started to vomit large amounts of dark, green emesis that help improved her symptoms. Then patient continued to have projectile vomiting that was dark and watery. Patient reports having cloudy fluid removed from her peritoneal cath and fluid was sent for culture.      Patient received her flu vaccine this year. Denies any sick contacts or change in diet.     Hospital Course  Patient was continued on peritoneal dialysis while inpatient with Vanc and Fortaz administered through the peritoneal dialysis. Patient has been afebrile while in patient. Recent WBC of 11.83. Blood culture have been no growth to date. Patient continued to have diarrhea and was FOBT postive. Per nephrology recommendation, GI was consulted and C. Diff was ordered. Please continue to follow up with Nephro for medical management.     Tasks: F/U with Nephro. F/U GI    Estimated Discharge Date: unknown    Discharge  Disposition: Home or Self Care

## 2019-03-01 PROBLEM — R19.5 OCCULT GI BLEEDING: Status: ACTIVE | Noted: 2019-01-01

## 2019-03-01 NOTE — HPI
This is a 48-year-old female with a past medical history of chronic anemia, coronary artery disease, diabetes, end-stage renal disease on peritoneal dialysis here noted to have a PD catheter infection and occult GI bleeding.  The patient has not noted any overt GI bleeding but is noted to be heme-positive.  This is associated with a moderate anemia which is normocytic in nature.  Review of her records reveals she had a colonoscopy done last year with removal of 1 advanced adenoma in addition to some erosions in the rectum with a visible vessel which was clipped.  She denies any change in bowel movements.  No dysphagia, odynophagia, nausea or vomiting. No melena.  No other exacerbating or relieving factors or other associated symptoms.    The following portions of the patient's history were reviewed and updated as appropriate: allergies, current medications, past family history, past medical history, past social history, past surgical history and problem list.    (Portions of this note were dictated using voice recognition software and may contain dictation related errors in spelling/grammar/syntax not found on text review)

## 2019-03-01 NOTE — NURSING
Received patient upon round 1905H, patient seen sitting at the edge of the bed, conscious , coherent to time, date, place, person and situation. GCS 15. Complaining of abdominal cramps, 8/10 With Saline lock right forearm gauge 22,  flushed patent with clean and dry dressing, IV site due to change, but patient refused, she said she is a hard stick.  Left AV fistula not positive with thrill and bruit, patient started peritoneal dialysis around 2200H, PD site clean dry and intact. Uses commode, passed one big stool around midnight, brown, large amount, loose in consistency, bowels hyperactive.  Advised patient to call for any assistance. CAll bell within reach. 95% on room air. Bed alarm ON. Safety fall precaution maintained.  Will continue to monitor patient.

## 2019-03-01 NOTE — SUBJECTIVE & OBJECTIVE
Past Medical History:   Diagnosis Date    Abnormal finding on Pap smear, HPV DNA positive     Anemia associated with chronic renal failure     on Epogen    Blood type B+     Bulging discs - symptomatic      CAD (coronary artery disease)     Diabetes mellitus, type 2     ESRD (end stage renal disease) 2004    FSGS (focal segmental glomerulosclerosis)     with collapsing    Hyperlipidemia     Hypertension     Neuropathy     Obesity     Secondary hyperparathyroidism, renal     TIA (transient ischemic attack)     Uterine fibroid     small uterine        Past Surgical History:   Procedure Laterality Date    CARDIAC CATHETERIZATION      PCI x 2     SECTION, CLASSIC      COLONOSCOPY N/A 2018    Performed by [facility] at [facility]O (2ND FLR)    DEBRIDEMENT-WOUND Left 2016    Performed by [facility]ox [facility] at [facility] OR    DIALYSIS FISTULA CREATION      multiple fistulas and grafts before PD     INCISION AND DRAINAGE (I&D), LABIAL N/A 2016    Performed by Harmony Fernandez MD at [facility] OR    INCISION AND DRAINAGE (I&D), LABIAL (ADD ON) Left 2016    Performed by [facility]a Maddox MD at [facility] OR    INCISION AND DRAINAGE OF WOUND Left     VULVAR ABCESS WITH NECROSIS    ORIF, HIP Left 2018    Performed by Tevin Grullon MD at [facility] OR    PERITONEAL CATHETER INSERTION      PERMCATH REWIRE- TUNNELED CATH REWIRE Left 2017    Performed by Baldev Munroe MD at [facility] CATH LAB    PERMCATH REWIRE- TUNNELED CATH REWIRE N/A 10/5/2017    Performed by [facility]dev Munroe MD at [facility] CATH LAB    UMBILICAL HERNIA REPAIR         Review of patient's allergies indicates:   Allergen Reactions    Clindamycin Diarrhea    Flagyl [metronidazole hcl]     Metronidazole      Family History     Problem Relation (Age of Onset)    Cancer Maternal Grandmother, Paternal Grandfather    Diabetes Maternal Aunt, Paternal Aunt        Tobacco Use    Smoking status: Never  Smoker    Smokeless tobacco: Never Used   Substance and Sexual Activity    Alcohol use: No     Comment: Pt reports some social use of about 1-2 drinks about every six months.    Drug use: No    Sexual activity: Not Currently     Partners: Male     Birth control/protection: None     Review of Systems   Constitutional: Positive for appetite change and fatigue. Negative for chills and fever.   HENT: Negative for postnasal drip and trouble swallowing.    Eyes: Negative for pain and redness.   Respiratory: Negative for cough, choking, chest tightness and shortness of breath.    Cardiovascular: Negative for chest pain and leg swelling.   Gastrointestinal: Positive for abdominal distention and abdominal pain. Negative for anal bleeding, blood in stool, constipation, diarrhea, nausea, rectal pain and vomiting.   Endocrine: Negative for cold intolerance and heat intolerance.   Genitourinary: Positive for decreased urine volume. Negative for difficulty urinating and hematuria.   Musculoskeletal: Positive for arthralgias and back pain.   Skin: Negative for color change and pallor.   Allergic/Immunologic: Negative for environmental allergies and food allergies.   Neurological: Negative for dizziness and light-headedness.   Hematological: Negative for adenopathy. Does not bruise/bleed easily.   Psychiatric/Behavioral: Negative for agitation and behavioral problems.     Objective:     Vital Signs (Most Recent):  Temp: 99 °F (37.2 °C) (03/01/19 1117)  Pulse: 92 (03/01/19 1145)  Resp: 18 (03/01/19 1150)  BP: (!) 96/55 (03/01/19 1150)  SpO2: 98 % (03/01/19 1117) Vital Signs (24h Range):  Temp:  [97.1 °F (36.2 °C)-99 °F (37.2 °C)] 99 °F (37.2 °C)  Pulse:  [82-94] 92  Resp:  [18] 18  SpO2:  [93 %-100 %] 98 %  BP: ()/(52-71) 96/55     Weight: 99 kg (218 lb 4.1 oz) (03/01/19 0559)  Body mass index is 33.68 kg/m².      Intake/Output Summary (Last 24 hours) at 3/1/2019 1251  Last data filed at 3/1/2019 0429  Gross per 24 hour    Intake 150 ml   Output -24 ml   Net 174 ml       Lines/Drains/Airways     Drain                 Hemodialysis AV Fistula Left upper arm -- days          Peripheral Intravenous Line                 Peripheral IV - Single Lumen 03/01/19 0555 Anterior;Right Hand less than 1 day                Physical Exam   Constitutional: She is oriented to person, place, and time. She appears well-developed and well-nourished. No distress.   HENT:   Head: Normocephalic and atraumatic.   Eyes: Conjunctivae are normal. No scleral icterus.   Neck: Normal range of motion. Neck supple. No tracheal deviation present. No thyromegaly present.   Cardiovascular: Normal rate and regular rhythm. Exam reveals no gallop and no friction rub.   No murmur heard.  Pulmonary/Chest: Effort normal and breath sounds normal. No respiratory distress. She has no wheezes.   Abdominal: Soft. Bowel sounds are normal. She exhibits distension. There is tenderness.   Musculoskeletal:        Right wrist: She exhibits normal range of motion and no tenderness.        Left wrist: She exhibits normal range of motion and no tenderness.   Lymphadenopathy:        Head (right side): No submental and no submandibular adenopathy present.        Head (left side): No submental and no submandibular adenopathy present.   Neurological: She is alert and oriented to person, place, and time.   Skin: Skin is warm and dry. No rash noted. She is not diaphoretic. No erythema.   Psychiatric: She has a normal mood and affect. Her behavior is normal.   Nursing note and vitals reviewed.      Significant Labs:  CBC:   Recent Labs   Lab 02/28/19  0333 03/01/19  0555   WBC 11.83 11.37   HGB 8.3* 9.2*   HCT 25.8* 29.1*   * 586*     CMP:   Recent Labs   Lab 03/01/19  0555   *   CALCIUM 8.9   ALBUMIN 2.0*   PROT 7.1   *   K 3.3*   CO2 17*   CL 90*   BUN 45*   CREATININE 9.1*   ALKPHOS 143*   ALT 5*   AST 17   BILITOT 0.6       Significant Imaging:  Imaging results within the  past 24 hours have been reviewed.

## 2019-03-01 NOTE — CONSULTS
[facility]  Gastroenterology  Consult Note    Patient Name: Jenni Todd  MRN: [id number]  Admission Date: 2/21/2019  Hospital Length of Stay: 5 days  Code Status: Full Code   Attending Provider: Dominick Bueno MD   Consulting Provider: Rosina Rivera MD  Primary Care Physician: Michael Tan Iii, MD  Principal Problem:Peritonitis associated with peritoneal dialysis    Inpatient consult to [facility]  Consult performed by: Rosina Rivera MD  Consult ordered by: Carlene Lopez MD  Reason for consult: Anemia        Subjective:     HPI:  This is a 48-year-old female with a past medical history of chronic anemia, coronary artery disease, diabetes, end-stage renal disease on peritoneal dialysis here noted to have a PD catheter infection and occult GI bleeding.  The patient has not noted any overt GI bleeding but is noted to be heme-positive.  This is associated with a moderate anemia which is normocytic in nature.  Review of her records reveals she had a colonoscopy done last year with removal of 1 advanced adenoma in addition to some erosions in the rectum with a visible vessel which was clipped.  She denies any change in bowel movements.  No dysphagia, odynophagia, nausea or vomiting. No melena.  No other exacerbating or relieving factors or other associated symptoms.    The following portions of the patient's history were reviewed and updated as appropriate: allergies, current medications, past family history, past medical history, past social history, past surgical history and problem list.    (Portions of this note were dictated using voice recognition software and may contain dictation related errors in spelling/grammar/syntax not found on text review)    Past Medical History:   Diagnosis Date    Abnormal finding on Pap smear, HPV DNA positive 2014    Anemia associated with chronic renal failure     on Epogen    Blood type B+     Bulging discs - symptomatic      CAD  (coronary artery disease)     Diabetes mellitus, type 2     ESRD (end stage renal disease) 2004    FSGS (focal segmental glomerulosclerosis)     with collapsing    Hyperlipidemia     Hypertension     Neuropathy     Obesity     Secondary hyperparathyroidism, renal     TIA (transient ischemic attack)     Uterine fibroid     small uterine        Past Surgical History:   Procedure Laterality Date    CARDIAC CATHETERIZATION      PCI x 2     SECTION, CLASSIC      COLONOSCOPY N/A 2018    Performed [facility] END[facility] (2ND FLR)    DEBRIDEMENT-WOUND Left 2016    Performed by [facility] at [facility] OR    DIALYSIS FISTULA CREATION      multiple fistulas and grafts before PD     INCISION AND DRAINAGE (I&D), LABIAL N/A 2016    [facility]formed by [facility]andez [facility] at [facility] OR    INCISION AND DRAINAGE (I&D), LABIAL (ADD ON) Left 2016    Performed by [facility]ox [facility] at [facility] OR    INCISION AND DRAINAGE OF WOUND Left     VULVAR ABCESS WITH NECROSIS    ORIF, HIP Left 2018    Performed by Tevin [facility]ullon MD at [facility] OR    PERITONEAL CATHETER INSERTION      PERMCATH REWIRE- TUNNELED CATH REWIRE Left 2017    Performed by [facility]dev [facility]roe MD at [facility] CATH LAB    PERMCATH REWIRE- TUNNELED CATH REWIRE N/A 10/5/2017    Performed by [facility] at [facility] CATH LAB    UMBILICAL HERNIA REPAIR         Review of patient's allergies indicates:   Allergen Reactions    Clindamycin Diarrhea    Flagyl [metronidazole hcl]     Metronidazole      Family History     Problem Relation (Age of Onset)    Cancer Maternal Grandmother, Paternal Grandfather    Diabetes Maternal Aunt, Paternal Aunt        Tobacco Use    Smoking status: Never Smoker    Smokeless tobacco: Never Used   Substance and Sexual Activity    Alcohol use: No     Comment: Pt reports some social use of about 1-2 drinks about every six months.    Drug use: No    Sexual activity: Not  Currently     Partners: Male     Birth control/protection: None     Review of Systems   Constitutional: Positive for appetite change and fatigue. Negative for chills and fever.   HENT: Negative for postnasal drip and trouble swallowing.    Eyes: Negative for pain and redness.   Respiratory: Negative for cough, choking, chest tightness and shortness of breath.    Cardiovascular: Negative for chest pain and leg swelling.   Gastrointestinal: Positive for abdominal distention and abdominal pain. Negative for anal bleeding, blood in stool, constipation, diarrhea, nausea, rectal pain and vomiting.   Endocrine: Negative for cold intolerance and heat intolerance.   Genitourinary: Positive for decreased urine volume. Negative for difficulty urinating and hematuria.   Musculoskeletal: Positive for arthralgias and back pain.   Skin: Negative for color change and pallor.   Allergic/Immunologic: Negative for environmental allergies and food allergies.   Neurological: Negative for dizziness and light-headedness.   Hematological: Negative for adenopathy. Does not bruise/bleed easily.   Psychiatric/Behavioral: Negative for agitation and behavioral problems.     Objective:     Vital Signs (Most Recent):  Temp: 99 °F (37.2 °C) (03/01/19 1117)  Pulse: 92 (03/01/19 1145)  Resp: 18 (03/01/19 1150)  BP: (!) 96/55 (03/01/19 1150)  SpO2: 98 % (03/01/19 1117) Vital Signs (24h Range):  Temp:  [97.1 °F (36.2 °C)-99 °F (37.2 °C)] 99 °F (37.2 °C)  Pulse:  [82-94] 92  Resp:  [18] 18  SpO2:  [93 %-100 %] 98 %  BP: ()/(52-71) 96/55     Weight: 99 kg (218 lb 4.1 oz) (03/01/19 0559)  Body mass index is 33.68 kg/m².      Intake/Output Summary (Last 24 hours) at 3/1/2019 1251  Last data filed at 3/1/2019 0429  Gross per 24 hour   Intake 150 ml   Output -24 ml   Net 174 ml       Lines/Drains/Airways     Drain                 Hemodialysis AV Fistula Left upper arm -- days          Peripheral Intravenous Line                 Peripheral IV - Single  Lumen 03/01/19 0555 Anterior;Right Hand less than 1 day                Physical Exam   Constitutional: She is oriented to person, place, and time. She appears well-developed and well-nourished. No distress.   HENT:   Head: Normocephalic and atraumatic.   Eyes: Conjunctivae are normal. No scleral icterus.   Neck: Normal range of motion. Neck supple. No tracheal deviation present. No thyromegaly present.   Cardiovascular: Normal rate and regular rhythm. Exam reveals no gallop and no friction rub.   No murmur heard.  Pulmonary/Chest: Effort normal and breath sounds normal. No respiratory distress. She has no wheezes.   Abdominal: Soft. Bowel sounds are normal. She exhibits distension. There is tenderness.   Musculoskeletal:        Right wrist: She exhibits normal range of motion and no tenderness.        Left wrist: She exhibits normal range of motion and no tenderness.   Lymphadenopathy:        Head (right side): No submental and no submandibular adenopathy present.        Head (left side): No submental and no submandibular adenopathy present.   Neurological: She is alert and oriented to person, place, and time.   Skin: Skin is warm and dry. No rash noted. She is not diaphoretic. No erythema.   Psychiatric: She has a normal mood and affect. Her behavior is normal.   Nursing note and vitals reviewed.      Significant Labs:  CBC:   Recent Labs   Lab 02/28/19  0333 03/01/19  0555   WBC 11.83 11.37   HGB 8.3* 9.2*   HCT 25.8* 29.1*   * 586*     CMP:   Recent Labs   Lab 03/01/19  0555   *   CALCIUM 8.9   ALBUMIN 2.0*   PROT 7.1   *   K 3.3*   CO2 17*   CL 90*   BUN 45*   CREATININE 9.1*   ALKPHOS 143*   ALT 5*   AST 17   BILITOT 0.6       Significant Imaging:  Imaging results within the past 24 hours have been reviewed.    Assessment/Plan:     Occult GI bleeding    - no overt bleeding noted  - peritonitis rx takes precedence currently  - can check fe studies  - monitor for overt bleeding  - given recent  colonoscopy, may benefit from egd after resolution of acute issues  - will follow, please call over the weekend/holiday if any acute issues     Peritonitis due to infected peritoneal dialysis catheter    - treatment in progress         Thank you for your consult. I will follow-up with patient. Please contact us if you have any additional questions.    Rosina Rivera MD  Gastroenterology  [facility]

## 2019-03-01 NOTE — PROGRESS NOTES
Progress Note  Nephrology      Consult Requested By: Dominick Bueno MD      SUBJECTIVE:     Overnight events  Patient is a 48 y.o. female     Patient Active Problem List   Diagnosis    Neuropathy    ESRD (end stage renal disease) on PD ( initiated dialysis 04/20/2004)    FSGS (focal segmental glomerulosclerosis) biopsy proven with collapsing features    Anemia in chronic kidney disease, on chronic dialysis    Hypertension    Hyperlipidemia    Obesity    CAD (coronary artery disease) with recent PCI 12/18/2012    Diabetes mellitus, type 2    Awaiting organ transplant status    Bulging discs - symptomatic     Spinal stenosis of sacral and sacrococcygeal region    Hypertensive renal disease    Hypoalbuminemia    Cerebral infarction due to embolism of middle cerebral artery    Gait abnormality    Chronic low back pain    DDD (degenerative disc disease), lumbar    PAD (peripheral artery disease)    Peritonitis associated with peritoneal dialysis    Peritonitis due to infected peritoneal dialysis catheter    Abdominal pain    Occult GI bleeding     Past Medical History:   Diagnosis Date    Abnormal finding on Pap smear, HPV DNA positive 2014    Anemia associated with chronic renal failure     on Epogen    Blood type B+     Bulging discs - symptomatic      CAD (coronary artery disease)     Diabetes mellitus, type 2     ESRD (end stage renal disease) 04/20/2004    FSGS (focal segmental glomerulosclerosis)     with collapsing    Hyperlipidemia     Hypertension 2004    Neuropathy     Obesity     Secondary hyperparathyroidism, renal     TIA (transient ischemic attack)     Uterine fibroid     small uterine               OBJECTIVE:     Vitals:    03/01/19 0810 03/01/19 1117 03/01/19 1145 03/01/19 1150   BP: 100/60 (!) 97/59 (!) 92/55 (!) 96/55   BP Location:  Right arm Left arm Right arm   Patient Position:  Lying Lying    Pulse: 87 94 92    Resp: 18  18 18   Temp: 97.3 °F (36.3 °C) 99  °F (37.2 °C)     TempSrc: Oral Oral     SpO2: 97% 98%     Weight:       Height:           Temp: 99 °F (37.2 °C) (03/01/19 1117)  Pulse: 92 (03/01/19 1145)  Resp: 18 (03/01/19 1150)  BP: (!) 96/55 (03/01/19 1150)  SpO2: 98 % (03/01/19 1117)               Medications:   aspirin  81 mg Oral Daily    atorvastatin  40 mg Oral QHS    calcitRIOL  0.5 mcg Oral QAM    carvedilol  3.125 mg Oral BID    cinacalcet  90 mg Oral QHS    clopidogrel  75 mg Oral Daily    epoetin adonay (PROCRIT) injection  20,000 Units Subcutaneous Every Mon, Wed, Fri    fluconazole  200 mg Oral Daily    gabapentin  100 mg Oral BID    Lactobacillus acidoph-L.bulgar  4 tablet Oral TID WM    potassium chloride 10%  20 mEq Oral Once    vitamin renal formula (B-complex-vitamin c-folic acid)  1 capsule Oral Daily                 Physical Exam:  General appearance:   Diarrhea this morning  Nausea  Abdominal discomfort  Lungs: diminished breath sounds  Heart: pulse 92   Abdomen: distended, soft  Extremities:  edema  Skin: dry  Laboratory:  ABG  Labs reviewed  No results found for this or any previous visit (from the past 336 hour(s)).  Recent Results (from the past 336 hour(s))   CBC with Automated Differential    Collection Time: 03/01/19  5:55 AM   Result Value Ref Range    WBC 11.37 3.90 - 12.70 K/uL    Hemoglobin 9.2 (L) 12.0 - 16.0 g/dL    Hematocrit 29.1 (L) 37.0 - 48.5 %    Platelets 586 (H) 150 - 350 K/uL   CBC with Automated Differential    Collection Time: 02/28/19  3:33 AM   Result Value Ref Range    WBC 11.83 3.90 - 12.70 K/uL    Hemoglobin 8.3 (L) 12.0 - 16.0 g/dL    Hematocrit 25.8 (L) 37.0 - 48.5 %    Platelets 570 (H) 150 - 350 K/uL   CBC with Automated Differential    Collection Time: 02/27/19  7:14 AM   Result Value Ref Range    WBC 10.17 3.90 - 12.70 K/uL    Hemoglobin 8.5 (L) 12.0 - 16.0 g/dL    Hematocrit 27.3 (L) 37.0 - 48.5 %    Platelets 534 (H) 150 - 350 K/uL     Urinalysis  No results for input(s): COLORU, CLARITYU,  SPECGRAV, PHUR, PROTEINUA, GLUCOSEU, BILIRUBINCON, BLOODU, WBCU, RBCU, BACTERIA, MUCUS, NITRITE, LEUKOCYTESUR, UROBILINOGEN, HYALINECASTS in the last 24 hours.    Diagnostic Results:  X-Ray: Reviewed  US: Reviewed  Echo: Reviewed  ACCESS    ASSESSMENT/PLAN:   ESRD  On Peritoneal dialysis  Peritoneal cell count 1648  Gram stain [place]ia  Continue antibiotics IV and IP  Hyponatremia  Hypokalemia  Replace K  Metabolic acidosis  Sodium bicarbonate  Metabolic bone disease  Hyperphosphatemia  Binders  Hypomagnesemia  Replace  Anemia  Renal cap  Epogen  Poor nutrition  Supplements  May need HD ( low albumin)

## 2019-03-01 NOTE — PROGRESS NOTES
[facility]  Progress note.  Follow up for:   Chief Complaint   Patient presents with    Vomiting     48 year old female presents to ed cc of abdominal pain with diarrhea that began on  and emesis today. patient is pd patient last pd exchange was yesterday full treamtent took antibiotic pd treatment today.        Hospital Stay Day 5      Subjective:  Patient is tolerating her diet. However, patient continues to have some diarrhea waterry. Patient states that Gas-Ex has been improving her cramping pain. Patient continues to have distended abdomen with mild epigastric tenderness. Denies any fever, chills, chest pain, sob.     Scheduled Meds:   aspirin  81 mg Oral Daily    atorvastatin  40 mg Oral QHS    calcitRIOL  0.5 mcg Oral QAM    carvedilol  3.125 mg Oral BID    cinacalcet  90 mg Oral QHS    clopidogrel  75 mg Oral Daily    epoetin adonay (PROCRIT) injection  20,000 Units Subcutaneous Every Mon, Wed, Fri    gabapentin  100 mg Oral TID    Lactobacillus acidoph-L.bulgar  4 tablet Oral TID WM    potassium chloride 10%  20 mEq Oral Once    vitamin renal formula (B-complex-vitamin c-folic acid)  1 capsule Oral Daily     Continuous Infusions:    PRN Meds:dextrose 50%, dextrose 50%, glucagon (human recombinant), glucose, glucose, HYDROmorphone, insulin aspart U-100, ondansetron, promethazine, simethicone, sodium chloride 0.9%    Review of patient's allergies indicates:   Allergen Reactions    Clindamycin Diarrhea    Flagyl [metronidazole hcl]     Metronidazole        Objectives:     Vitals(Most Recent)      BP  Min: 96/52  Max: 128/60  Temp  Av.5 °F (36.4 °C)  Min: 97.1 °F (36.2 °C)  Max: 98.2 °F (36.8 °C)  Pulse  Av.9  Min: 82  Max: 90  Resp  Av  Min: 18  Max: 18  SpO2  Av %  Min: 93 %  Max: 100 %  Weight  Av kg (218 lb 4.1 oz)  Min: 99 kg (218 lb 4.1 oz)  Max: 99 kg (218 lb 4.1 oz)             Vitals([id number])  Temp:  [97.1 °F (36.2 °C)-98.2 °F (36.8 °C)]    Pulse:  [82-90]   Resp:  [18]   BP: ()/(52-71)   SpO2:  [93 %-100 %]     I & O([id number])    Intake/Output Summary (Last 24 hours) at 3/1/2019 1007  Last data filed at 3/1/2019 0429  Gross per 24 hour   Intake 200 ml   Output -24 ml   Net 224 ml       Physical Exam:   General: AAOx3. NAD.  HEENT: NCAT. PERRLA. EOMI.     CV: RRR.No M/R/G.   Chest: NL effort. CTAB. No R/R/W.   Abd: +BS x 4. Distended and Tender to mild palpation. No rebound or guarding.   Ext: moving well. +distal pulses  Skin: Intact. No rash. No lesions.   Neuro: CN II-XII intact. No focal deficit.  Psych:  No A/V hallucinations. NL affect. No abnormal behaviors noted    LABS  CBC  Recent Labs   Lab 02/27/19 0714 02/28/19 0333 03/01/19  0555   WBC 10.17 11.83 11.37   RBC 2.82* 2.71* 3.01*   HGB 8.5* 8.3* 9.2*   HCT 27.3* 25.8* 29.1*   * 570* 586*   MCV 97 95 97   MCH 30.1 30.6 30.6   MCHC 31.1* 32.2 31.6*       CMP  Recent Labs   Lab 02/27/19 0714 02/28/19 0333 03/01/19  0555   * 130*  129* 126*   K 3.2* 3.4*  3.4* 3.3*   CO2 21* 20*  20* 17*   CL 90* 91*  90* 90*   BUN 49* 49*  49* 45*   CREATININE 9.3* 9.5*  9.6* 9.1*   * 204*  205* 224*     Recent Labs   Lab 02/27/19 0714 02/28/19 0333 03/01/19  0555   CALCIUM 8.0* 8.3*  8.4* 8.9   MG 1.5* 1.7 1.4*   PHOS 5.8* 6.1*  6.2* 6.0*     Recent Labs   Lab 02/27/19 0714 02/28/19 0333 03/01/19  0555   PROT 6.7 6.9 7.1   ALBUMIN 2.0* 1.9*  2.0* 2.0*   BILITOT 0.5 0.5 0.6   AST 26 27 17   ALKPHOS 140* 148* 143*   ALT 13 8* 5*       LAST HbA1c  Lab Results   Component Value Date    HGBA1C 6.3 (H) 09/12/2018     Micro:  Microbiology Results (last 7 days)     Procedure Component Value Units Date/Time    Clostridium difficile EIA [[id number]] Collected:  03/01/19 0855    Order Status:  Sent Specimen:  Stool Updated:  03/01/19 0915    Culture, Fluid  (Aerobic) [[id number]]     Order Status:  No result Specimen:  Body Fluid from Ascites     Blood culture [[id number]]  Collected:  02/21/19 1901    Order Status:  Completed Specimen:  Blood from Peripheral, Hand, Left Updated:  02/27/19 0612     Blood Culture, Routine No growth after 5 days.    Blood culture [[id number]] Collected:  02/21/19 1847    Order Status:  Completed Specimen:  Blood from Peripheral, Hand, Right Updated:  02/27/19 0612     Blood Culture, Routine No growth after 5 days.    Aerobic culture [[id number]] Collected:  02/22/19 1830    Order Status:  Sent Specimen:  Body Fluid from Peritoneal Fluid Updated:  02/22/19 1841    Gram stain [[id number]] Collected:  02/22/19 1830    Order Status:  Sent Specimen:  Body Fluid from Peritoneal Fluid Updated:  02/22/19 1841          Assessment/Plan:   49 yo female with history of HTN, DM, PAD, AoCD 2/2 CKD, on PD since 2004 presenting for peritonitis     Peritonitis 2/2 Peritoneal Dialysis  PD initiated on 4/20/04  Hx of 5-6 episodes of Peritonitis in the past  Sent to ED by Nephrology for concerns of peritonitis  [facility]ult[facility]  Per Nephro, Cultures grew gram positive cocci  Received 1 dose of Vanc and Fortaz at 11:30 by PD nurse   On admission, patient was afebrile with no leukocytosis  Blood cultures: NGTD  Consulted Nephrology, appreciate recs  Continue Vanc and Fortaz in peritoneal bag per Nephro     Hypertension  BP goal <140/80  Blood pressure stable and at baseline for patient      Anemia of Chronic Disease secondary to CKD  H/H at baseline  BUN/Cr at baseline  Will continue to monitor     DM  A1C (9/12/18): 6.3  AM glucose: 205  Insulin Detemir 200U in peritoneal bag per Nephro  Continue to monitor with POCT glucose QID  Currently on SSI     PT/OT: ordered and following  Code: full  Diet: Renal, Diabetic diet  Ppx: dvt: Aspirin, Plavix, SCDs, leander    Dispo: Pending final nephro recs and antibiotics completion for peritonitis. F/u c diff studies.      Loyd Beltre, [facility]  03/01/2019

## 2019-03-01 NOTE — PROGRESS NOTES
Progress Note  Nephrology      Consult Requested By: Dominick Bueno MD      SUBJECTIVE:     Overnight events  Patient is a 48 y.o. female     Patient Active Problem List   Diagnosis    Neuropathy    ESRD (end stage renal disease) on PD ( initiated dialysis 04/20/2004)    FSGS (focal segmental glomerulosclerosis) biopsy proven with collapsing features    Anemia in chronic kidney disease, on chronic dialysis    Hypertension    Hyperlipidemia    Obesity    CAD (coronary artery disease) with recent PCI 12/18/2012    Diabetes mellitus, type 2    Awaiting organ transplant status    Bulging discs - symptomatic     Spinal stenosis of sacral and sacrococcygeal region    Hypertensive renal disease    Hypoalbuminemia    Cerebral infarction due to embolism of middle cerebral artery    Gait abnormality    Chronic low back pain    DDD (degenerative disc disease), lumbar    PAD (peripheral artery disease)    Peritonitis associated with peritoneal dialysis    Peritonitis due to infected peritoneal dialysis catheter    Abdominal pain     Past Medical History:   Diagnosis Date    Abnormal finding on Pap smear, HPV DNA positive 2014    Anemia associated with chronic renal failure     on Epogen    Blood type B+     Bulging discs - symptomatic      CAD (coronary artery disease)     Diabetes mellitus, type 2     ESRD (end stage renal disease) 04/20/2004    FSGS (focal segmental glomerulosclerosis)     with collapsing    Hyperlipidemia     Hypertension 2004    Neuropathy     Obesity     Secondary hyperparathyroidism, renal     TIA (transient ischemic attack)     Uterine fibroid     small uterine               OBJECTIVE:     Vitals:    02/28/19 1607 02/28/19 1633 02/28/19 2005 02/28/19 2006   BP: 104/67  113/71    BP Location: Left arm      Patient Position: Lying      Pulse: 84  90    Resp: 18  18    Temp: 97.1 °F (36.2 °C)  97.8 °F (36.6 °C)    TempSrc: Oral      SpO2:  100% 97% 97%   Weight:        Height:           Temp: 97.8 °F (36.6 °C) (02/28/19 2005)  Pulse: 90 (02/28/19 2005)  Resp: 18 (02/28/19 2005)  BP: 113/71 (02/28/19 2005)  SpO2: 97 % (02/28/19 2006)    Date 02/28/19 0700 - 03/01/19 0659   Shift 2245-0549 1026-8542 4433-1877 24 Hour Total   INTAKE   P.O. 175   175   Shift Total(mL/kg) 175(1.7)   175(1.7)   OUTPUT   Other  -24  -24   Shift Total(mL/kg)  -24(-0.2)  -24(-0.2)   Weight (kg) 100.7 100.7 100.7 100.7             Medications:   aspirin  81 mg Oral Daily    atorvastatin  40 mg Oral QHS    calcitRIOL  0.5 mcg Oral QAM    carvedilol  3.125 mg Oral BID    cinacalcet  90 mg Oral QHS    clopidogrel  75 mg Oral Daily    epoetin adonay (PROCRIT) injection  20,000 Units Subcutaneous Every Mon, Wed, Fri    gabapentin  100 mg Oral TID    Lactobacillus acidoph-L.bulgar  4 tablet Oral TID WM    vitamin renal formula (B-complex-vitamin c-folic acid)  1 capsule Oral Daily                 Physical Exam:  General appearance: NAD  Diarrhea  Abdominal discomfort  Lungs: diminished breath sounds  Heart: pulse 90  Abdomen: distended, soft  Extremities: no edema  Skin: dry  laboratory:  ABG  Labs reviewed  No results found for this or any previous visit (from the past 336 hour(s)).  Recent Results (from the past 336 hour(s))   CBC with Automated Differential    Collection Time: 02/28/19  3:33 AM   Result Value Ref Range    WBC 11.83 3.90 - 12.70 K/uL    Hemoglobin 8.3 (L) 12.0 - 16.0 g/dL    Hematocrit 25.8 (L) 37.0 - 48.5 %    Platelets 570 (H) 150 - 350 K/uL   CBC with Automated Differential    Collection Time: 02/27/19  7:14 AM   Result Value Ref Range    WBC 10.17 3.90 - 12.70 K/uL    Hemoglobin 8.5 (L) 12.0 - 16.0 g/dL    Hematocrit 27.3 (L) 37.0 - 48.5 %    Platelets 534 (H) 150 - 350 K/uL   CBC with Automated Differential    Collection Time: 02/26/19  4:11 AM   Result Value Ref Range    WBC 12.05 3.90 - 12.70 K/uL    Hemoglobin 8.5 (L) 12.0 - 16.0 g/dL    Hematocrit 27.8 (L) 37.0 - 48.5 %     Platelets 528 (H) 150 - 350 K/uL     Urinalysis  No results for input(s): COLORU, CLARITYU, SPECGRAV, PHUR, PROTEINUA, GLUCOSEU, BILIRUBINCON, BLOODU, WBCU, RBCU, BACTERIA, MUCUS, NITRITE, LEUKOCYTESUR, UROBILINOGEN, HYALINECASTS in the last 24 hours.    Diagnostic Results:  X-Ray: Reviewed  US: Reviewed  Echo: Reviewed  ACCESS    ASSESSMENT/PLAN:     ESRD on PD  Peritonitis  Antibiotics  Metabolic bone disease  Hypokalemia  Replace  Anemia  Epogen  Malnutrition  Supplements if tolerates

## 2019-03-01 NOTE — ASSESSMENT & PLAN NOTE
- no overt bleeding noted  - peritonitis rx takes precedence currently  - can check fe studies  - monitor for overt bleeding  - given recent colonoscopy, may benefit from egd after resolution of acute issues  - will follow, please call over the weekend/holiday if any acute issues

## 2019-03-01 NOTE — PLAN OF CARE
Problem: Adult Inpatient Plan of Care  Goal: Plan of Care Review  Outcome: Ongoing (interventions implemented as appropriate)  Stable VS over the night had 3 loose yellowish stool over the night. Still with abdominal crmaps. IV line reinserted on left hand gauge 20, patent. Still indwelling peritoneal dialysis. BP still on the low side. NO vomiting noted over the night.Free from Fall. Kept on contact precaution.Will endorse to dayshift nurse.

## 2019-03-02 NOTE — PLAN OF CARE
Problem: Adult Inpatient Plan of Care  Goal: Plan of Care Review  Outcome: Ongoing (interventions implemented as appropriate)  POC discussed with pt. Pt is awake and alert,oriented X4. No distress noted. Pain medication administered PRN as per ordered for abd pain.Patient continues to refuse telemetry. PD in place.Bed in lowest position. Safety/Fall precautions maintained. Call bell in reach. All questions answered. Verbalized understanding.Will continue to monitor.

## 2019-03-02 NOTE — PROGRESS NOTES
[facility]  Progress Note    Follow up for: Vomiting      Hospital Stay Day 6      Subjective:  Patient is tolerating her diet. However, patient continues to have some diarrhea waterry. Patient states that Gas-Ex has been improving her cramping pain. Had flatus on bedside commode with some relief of abdom discomfort.  Patient continues to have distended abdomen with mild epigastric tenderness. Denies any fever, chills, chest pain, sob.     Scheduled Meds:   aspirin  81 mg Oral Daily    atorvastatin  40 mg Oral QHS    calcitRIOL  0.5 mcg Oral QAM    carvedilol  3.125 mg Oral BID    ceftAZIDime (FORTAZ) IVPB  1 g Intravenous Q12H    cinacalcet  90 mg Oral QHS    clopidogrel  75 mg Oral Daily    epoetin adonay (PROCRIT) injection  20,000 Units Subcutaneous Every Mon, Wed, Fri    fluconazole  200 mg Oral Daily    gabapentin  100 mg Oral QHS    Lactobacillus acidoph-L.bulgar  4 tablet Oral TID WM    potassium chloride  20 mEq Oral Daily    sevelamer carbonate  800 mg Oral TID WM    sodium bicarbonate  650 mg Oral BID    vitamin renal formula (B-complex-vitamin c-folic acid)  1 capsule Oral Daily     Continuous Infusions:    PRN Meds:dextrose 50%, dextrose 50%, glucagon (human recombinant), glucose, glucose, HYDROmorphone, insulin aspart U-100, ondansetron, promethazine, simethicone, sodium chloride 0.9%    Review of patient's allergies indicates:   Allergen Reactions    Clindamycin Diarrhea    Flagyl [metronidazole hcl]     Metronidazole        Objectives:     Vitals(Most Recent)      BP  Min: 92/55  Max: 118/59  Temp  Av °F (36.7 °C)  Min: 97.1 °F (36.2 °C)  Max: 99 °F (37.2 °C)  Pulse  Av.3  Min: 91  Max: 96  Resp  Av  Min: 16  Max: 20  SpO2  Av.8 %  Min: 96 %  Max: 99 %  Weight  Av.4 kg (219 lb 2.2 oz)  Min: 99.4 kg (219 lb 2.2 oz)  Max: 99.4 kg (219 lb 2.2 oz)             Vitals([id number])  Temp:  [97.1 °F (36.2 °C)-99 °F (37.2 °C)]   Pulse:  [91-96]    Resp:  [16-20]   BP: ()/(53-60)   SpO2:  [96 %-99 %]     I & O([id number])    Intake/Output Summary (Last 24 hours) at 3/2/2019 0836  Last data filed at 3/2/2019 0600  Gross per 24 hour   Intake 375 ml   Output 249 ml   Net 126 ml       Physical Exam:   General: AAOx3. NAD.  HEENT: NCAT. PERRLA. EOMI.     CV: RRR.No M/R/G.   Chest: NL effort. CTAB. No R/R/W.   Abd: +BS x 4. Distended and Tender to mild palpation. No rebound or guarding.   Ext: moving well. +distal pulses  Skin: Intact. No rash. No lesions.   Neuro: CN II-XII intact. No focal deficit.  Psych:  No A/V hallucinations. NL affect. No abnormal behaviors noted    LABS  CBC  Recent Labs   Lab 02/28/19 0333 03/01/19  0555 03/02/19  0530   WBC 11.83 11.37 11.24   RBC 2.71* 3.01* 3.08*   HGB 8.3* 9.2* 9.2*   HCT 25.8* 29.1* 29.7*   * 586* 575*   MCV 95 97 96   MCH 30.6 30.6 29.9   MCHC 32.2 31.6* 31.0*       CMP  Recent Labs   Lab 02/28/19 0333 03/01/19  0555 03/02/19  0530   *  129* 126* 131*  131*   K 3.4*  3.4* 3.3* 3.4*  3.4*   CO2 20*  20* 17* 21*  21*   CL 91*  90* 90* 92*  93*   BUN 49*  49* 45* 43*  42*   CREATININE 9.5*  9.6* 9.1* 9.1*  9.1*   *  205* 224* 193*  190*     Recent Labs   Lab 02/28/19 0333 03/01/19  0555 03/02/19  0530   CALCIUM 8.3*  8.4* 8.9 9.1  9.2   MG 1.7 1.4* 1.5*   PHOS 6.1*  6.2* 6.0* 5.7*  5.8*     Recent Labs   Lab 02/28/19  0333 03/01/19  0555 03/02/19  0530   PROT 6.9 7.1 7.2   ALBUMIN 1.9*  2.0* 2.0* 2.0*  2.0*   BILITOT 0.5 0.6 0.6   AST 27 17 13   ALKPHOS 148* 143* 142*   ALT 8* 5* 6*       LAST HbA1c  Lab Results   Component Value Date    HGBA1C 6.3 (H) 09/12/2018     Micro:  Microbiology Results (last 7 days)     Procedure Component Value Units Date/Time    C Diff Toxin by PCR [[id number]] Collected:  03/01/19 0855    Order Status:  Completed Updated:  03/02/19 0556     C. diff PCR Negative    Culture, Fluid  (Aerobic) [[id number]] Collected:  03/01/19 1439    Order Status:   Completed Specimen:  Body Fluid from Ascites Updated:  03/01/19 2256     Gram Stain Result Rare WBC's      No organisms seen    Clostridium difficile EIA [[id number]]  (Abnormal) Collected:  03/01/19 0855    Order Status:  Completed Specimen:  Stool Updated:  03/01/19 2057     C. diff Antigen Positive     C difficile Toxins A+B, EIA Negative     Comment: Testing not recommended for children <24 months old.       Fungus culture [[id number]] Collected:  03/01/19 1542    Order Status:  Sent Specimen:  Body Fluid from Abdomen Updated:  03/01/19 2004    AFB Culture & Smear [[id number]] Collected:  03/01/19 1542    Order Status:  Sent Specimen:  Body Fluid from Abdomen Updated:  03/01/19 2004    Culture, Anaerobe [[id number]] Collected:  03/01/19 1542    Order Status:  Sent Specimen:  Body Fluid from Abdomen Updated:  03/01/19 2004    Blood culture [[id number]] Collected:  02/21/19 1901    Order Status:  Completed Specimen:  Blood from Peripheral, Hand, Left Updated:  02/27/19 0612     Blood Culture, Routine No growth after 5 days.    Blood culture [[id number]] Collected:  02/21/19 1847    Order Status:  Completed Specimen:  Blood from Peripheral, Hand, Right Updated:  02/27/19 0612     Blood Culture, Routine No growth after 5 days.          Assessment/Plan:   49 yo female with history of HTN, DM, PAD, AoCD 2/2 CKD, on PD since 2004 presenting for peritonitis     Peritonitis 2/2 Peritoneal Dialysis  PD initiated on 4/20/04  Hx of 5-6 episodes of Peritonitis in the past  Per Nephro, Cultures taken PTA grew gram positive cocci at [facility]  Received 1 dose of Vanc and Fortaz  On admission, patient was afebrile with no leukocytosis  Blood cultures: NGTD  Consulted Nephrology, appreciate recs  Continue Vanc and Fortaz in peritoneal bag per Nephro     ESRD  Continuing PD per nephro  Hypokalemia, hypomagnesium, hyperphosphotemia managed by nephro w/ PD    Hypertension  BP goal <140/80  Blood pressure stable and at baseline for patient       Anemia of Chronic Disease secondary to CKD  H/H at baseline  BUN/Cr at baseline  Will continue to monitor     DM  A1C (9/12/18): 6.3  AM glucose: 193  Insulin Detemir 200U in peritoneal bag per Nephro  Continue to monitor with POCT glucose QID  Currently on SSI     C Diff  Antigen Positive, toxin neg  Will f/u confirmatory testing    PT/OT: ordered and following  Code: full  Diet: Renal, Diabetic diet  Ppx: dvt: Aspirin, Plavix, SCDs, leander    Dispo: Pending final nephro recs and antibiotics completion for peritonitis.      Reagan Rivera, LSUHO2  03/02/2019

## 2019-03-02 NOTE — PLAN OF CARE
VN and patient talked briefly because she was getting set up with PD dialysis and she was eating. Education given on blood clot prevention with the SCDs ordered.  All safety measures maintained including bed locked and in lowest position with alarm on and call light within reach.  Patient states that she will not get out of bed by herself and will use the call light to call first.

## 2019-03-02 NOTE — NURSING
Stable VS over the night, had 3 loose yellowish stool over the night. Still with abdominal crmaps. IV line patent.Peritoneal dialysis completed UF of 249cc. BP still on the low side, carvedilol was held. NO vomiting noted over the night. Free from Fall. Kept on contact precaution.Will endorse to dayshift nurse.

## 2019-03-02 NOTE — NURSING
Received patient upon round 1905H, patient seen sitting at the edge of the bed, conscious , coherent to time, date, place, person and situation. GCS 15. Complaining of abdominal cramps, 8/10,  With Saline lock right hand gauge 20,  flushed patent with clean and dry dressing. Left AV fistula not positive with thrill and bruit, patient started peritoneal dialysis around 1800H, PD site clean dry and intact. Uses commode, still with diarrhea yellowish loose stool in consistency, bowels hyperactive. Offered patient to be on diaper since since passing diarrhea all over the floor, patient refused. Education regarding infection control given.  Advised patient to call for any assistance. CAll bell within reach. 95% on room air. Bed alarm ON. Safety fall precaution maintained.  Will continue to monitor patient.

## 2019-03-02 NOTE — PROGRESS NOTES
[facility]  Nephrology  Progress Note    Patient Name: Jenni Todd  MRN: [id number]  Admission Date: 2/21/2019  Hospital Length of Stay: 6 days  Attending Provider: Dominick Bueno MD   Primary Care Physician: Michael Tan Iii, MD  Principal Problem:Peritonitis associated with peritoneal dialysis  Date of service 3/2/2019  Consults   Reason for consult: PD, peritonitis  Subjective:     Interval History: She has persistent abdominal pain, fullness.  Having bowel movements.  SOB bec it hurts to take deep breaths in her abdomen.  No n/v    Review of patient's allergies indicates:   Allergen Reactions    Clindamycin Diarrhea    Flagyl [metronidazole hcl]     Metronidazole      Current Facility-Administered Medications   Medication Frequency    aspirin EC tablet 81 mg Daily    atorvastatin tablet 40 mg QHS    calcitRIOL capsule 0.5 mcg QAM    carvedilol tablet 3.125 mg BID    ceFAZolin 2,000 mg in dianeal low calcium 1.5 % Ca 2.5 mEq/L- Mg 0.5 mEq/L 2,000 mL Once    ceftAZIDime (FORTAZ) 1 g in dextrose 5 % 50 mL IVPB Q12H    cinacalcet tablet 90 mg QHS    clopidogrel tablet 75 mg Daily    dextrose 50% injection 12.5 g PRN    dextrose 50% injection 25 g PRN    epoetin adonay injection 20,000 Units Every Mon, Wed, Fri    fluconazole tablet 200 mg Daily    gabapentin capsule 100 mg QHS    glucagon (human recombinant) injection 1 mg PRN    glucose chewable tablet 16 g PRN    glucose chewable tablet 24 g PRN    hydromorphone (PF) injection 1 mg Q6H PRN    insulin aspart U-100 pen 0-5 Units QID (AC + HS) PRN    Lactobacillus acidoph-L.bulgar 1 million cell tablet 4 tablet TID WM    magnesium sulfate 2g in water 50mL IVPB (premix) Once    ondansetron injection 4 mg Q8H PRN    potassium chloride 10% oral solution 40 mEq Once    potassium chloride SA CR tablet 20 mEq Daily    promethazine tablet 12.5 mg Q6H PRN    sevelamer carbonate tablet 800 mg TID WM    simethicone chewable  tablet 125 mg Q6H PRN    sodium bicarbonate tablet 650 mg BID    sodium chloride 0.9% flush 5 mL PRN    vitamin renal formula (B-complex-vitamin c-folic acid) 1 mg per capsule 1 capsule Daily       Objective:     Vital Signs (Most Recent):  Temp: 98.4 °F (36.9 °C) (03/02/19 0727)  Pulse: 91 (03/02/19 0727)  Resp: 16 (03/02/19 0727)  BP: (!) 106/58 (03/02/19 0727)  SpO2: 99 % (03/02/19 0345)  O2 Device (Oxygen Therapy): room air (03/02/19 0345) Vital Signs (24h Range):  Temp:  [97.1 °F (36.2 °C)-98.4 °F (36.9 °C)] 98.4 °F (36.9 °C)  Pulse:  [91-96] 91  Resp:  [16-20] 16  SpO2:  [96 %-99 %] 99 %  BP: ()/(53-60) 106/58     Weight: 99.4 kg (219 lb 2.2 oz) (03/02/19 0600)  Body mass index is 33.82 kg/m².  Body surface area is 2.18 meters squared.    I/O last 3 completed shifts:  In: 650 [P.O.:550; IV Piggyback:100]  Out: 249 [Other:249]    Physical Exam   Constitutional: She is oriented to person, place, and time. She appears well-developed and well-nourished. No distress.   HENT:   Head: Normocephalic and atraumatic.   Eyes: EOM are normal. No scleral icterus.   Cardiovascular: Normal rate and regular rhythm.   Pulmonary/Chest: Effort normal and breath sounds normal. No respiratory distress.   Abdominal: Soft. She exhibits distension. There is tenderness (diffusely tender, no rebound/guarding).   PD catheter   Musculoskeletal: She exhibits no edema or deformity.   Neurological: She is alert and oriented to person, place, and time.   Skin: Skin is warm and dry. No rash noted. She is not diaphoretic.   Psychiatric: She has a normal mood and affect. Her behavior is normal.   Nursing note and vitals reviewed.      Significant Labs:  CBC:   Recent Labs   Lab 03/02/19  0530   WBC 11.24   RBC 3.08*   HGB 9.2*   HCT 29.7*   *   MCV 96   MCH 29.9   MCHC 31.0*     CMP:   Recent Labs   Lab 03/02/19  0530   *  190*   CALCIUM 9.1  9.2   ALBUMIN 2.0*  2.0*   PROT 7.2   *  131*   K 3.4*  3.4*   CO2  21*  21*   CL 92*  93*   BUN 43*  42*   CREATININE 9.1*  9.1*   ALKPHOS 142*   ALT 6*   AST 13   BILITOT 0.6     Microbiology Results (last 7 days)     Procedure Component Value Units Date/Time    Culture, Fluid  (Aerobic) [[id number]] Collected:  03/01/19 1439    Order Status:  Completed Specimen:  Body Fluid from Ascites Updated:  03/02/19 1213     AEROBIC CULTURE - FLUID No growth     Gram Stain Result Rare WBC's      No organisms seen    C Diff Toxin by PCR [[id number]] Collected:  03/01/19 0855    Order Status:  Completed Updated:  03/02/19 0556     C. diff PCR Negative    Clostridium difficile EIA [[id number]]  (Abnormal) Collected:  03/01/19 0855    Order Status:  Completed Specimen:  Stool Updated:  03/01/19 2057     C. diff Antigen Positive     C difficile Toxins A+B, EIA Negative     Comment: Testing not recommended for children <24 months old.       Fungus culture [[id number]] Collected:  03/01/19 1542    Order Status:  Sent Specimen:  Body Fluid from Abdomen Updated:  03/01/19 2004    AFB Culture & Smear [[id number]] Collected:  03/01/19 1542    Order Status:  Sent Specimen:  Body Fluid from Abdomen Updated:  03/01/19 2004    Culture, Anaerobe [[id number]] Collected:  03/01/19 1542    Order Status:  Sent Specimen:  Body Fluid from Abdomen Updated:  03/01/19 2004    Blood culture [[id number]] Collected:  02/21/19 1901    Order Status:  Completed Specimen:  Blood from Peripheral, Hand, Left Updated:  02/27/19 0612     Blood Culture, Routine No growth after 5 days.    Blood culture [[id number]] Collected:  02/21/19 1847    Order Status:  Completed Specimen:  Blood from Peripheral, Hand, Right Updated:  02/27/19 0612     Blood Culture, Routine No growth after 5 days.        No results for input(s): COLORU, CLARITYU, SPECGRAV, PHUR, PROTEINUA, GLUCOSEU, BILIRUBINCON, BLOODU, WBCU, RBCU, BACTERIA, MUCUS, NITRITE, LEUKOCYTESUR, UROBILINOGEN, HYALINECASTS in the last 168 hours.  All labs within the past 24 hours  have been reviewed.        Assessment/Plan:     Active Diagnoses:    Diagnosis Date Noted POA    PRINCIPAL PROBLEM:  Peritonitis associated with peritoneal dialysis [T85.71XA] 02/21/2019 Unknown    Occult GI bleeding [R19.5] 03/01/2019 Unknown    Abdominal pain [R10.9]  Yes    Peritonitis due to infected peritoneal dialysis catheter [T85.71XA, K65.9] 02/21/2019 Yes    Chronic low back pain [M54.5, G89.29] 12/23/2015 Yes    Hypoalbuminemia [E88.09] 07/14/2015 Yes    Anemia in chronic kidney disease, on chronic dialysis [N18.6, D63.1, Z99.2]  Not Applicable    Hypertension [I10]  Yes    Hyperlipidemia [E78.5]  Yes    CAD (coronary artery disease) with recent PCI 12/18/2012 [I25.10]  Yes    Diabetes mellitus, type 2 [E11.9]  Yes    ESRD (end stage renal disease) on PD ( initiated dialysis 04/20/2004) [N18.6] 04/20/2004 Yes      Problems Resolved During this Admission:         1. ESRD - PD  - continue nightly PD, will adjust dwell volume to 2.5L from 2.8L 2/2 abdominal discomfort, monitor clearance for the need to increase number of dwells while reduced volume  - renally dose medications for dialysis  Peritonitis - monitor cell counts, continue IP Cefazolin and ceftazidime, monitor vanc levels  Hyponatremia - continue uf with PD, monitor BMP daily, convert IVPB to NS when possible  Hypokalemia - Replace, check level in AM  Hypomagnesemia - replace IV, check level in AM  Acidosis - continue PO bicarb, check BMP in AM  2. HTN - continue current medications, UF as tolerated on dialysis  3. Anemia of chronic kidney disease - Hgb stable, continue to monitor CBC  4. MBD/secondary hyperparathyroidism - continue sevelamer, phos uncontrolled, continue to monitor phos  5. Access - PD catherer  6. Nutrition - recommend renal diet, nepro    Cefazolin to PD fluids  Reduce PD fluid volume temporarily  Pending Cx results  Replace Mg and K today, repeat BMP in AM    Thank you for allowing me to participate in the care of  your patient.  Please call with any questions.    Date of service: 3/2/2019  Sita Bennett MD   Nephrology  [facility]  Office [phone]   [facility]

## 2019-03-03 NOTE — PLAN OF CARE
Problem: Adult Inpatient Plan of Care  Goal: Plan of Care Review  Outcome: Ongoing (interventions implemented as appropriate)  Patient safety maintained throughout the shift, IV, tele monitor, continue peritoneal dialysis, CT to be done after peritoneal dialysis, pt was not feeling well and didn't eat lunch or dinner and didn't take med from 12pm-1800, dilaudid given around 1800, pt in constant pain, active diarrhea, pt in stable condition, will continue to monitor

## 2019-03-03 NOTE — PLAN OF CARE
Patient sleeping and stated a request not to be disturbed when VN qued into room. Lights turned off. All safety measures maintained including bed locked, in lowest position with alarm on.  Call light within reach.

## 2019-03-03 NOTE — PROGRESS NOTES
[facility] Medicine  Progress Note    Follow up for: Vomiting      Hospital Stay Day 7      Subjective:  Patient is tolerating her diet. C-diff neg. Leukocytosis this am. Patient continues to have distended abdomen with mild epigastric tenderness. Denies any fever, chills, chest pain, sob.     Scheduled Meds:   aspirin  81 mg Oral Daily    atorvastatin  40 mg Oral QHS    calcitRIOL  0.5 mcg Oral QAM    carvedilol  3.125 mg Oral BID    ceftAZIDime (FORTAZ) IVPB  1 g Intravenous Q12H    cinacalcet  90 mg Oral QHS    clopidogrel  75 mg Oral Daily    epoetin adonay (PROCRIT) injection  20,000 Units Subcutaneous Every Mon, Wed, Fri    fluconazole  200 mg Oral Daily    gabapentin  100 mg Oral QHS    Lactobacillus acidoph-L.bulgar  4 tablet Oral TID WM    potassium chloride  20 mEq Oral Daily    sevelamer carbonate  800 mg Oral TID WM    sodium bicarbonate  650 mg Oral BID    vitamin renal formula (B-complex-vitamin c-folic acid)  1 capsule Oral Daily     Continuous Infusions:    PRN Meds:dextrose 50%, dextrose 50%, glucagon (human recombinant), glucose, glucose, HYDROmorphone, insulin aspart U-100, ondansetron, promethazine, simethicone, sodium chloride 0.9%    Review of patient's allergies indicates:   Allergen Reactions    Clindamycin Diarrhea    Flagyl [metronidazole hcl]     Metronidazole        Objectives:     Vitals(Most Recent)      BP  Min: 94/58  Max: 133/66  Temp  Av.4 °F (36.9 °C)  Min: 97.7 °F (36.5 °C)  Max: 99.6 °F (37.6 °C)  Pulse  Av.5  Min: 90  Max: 101  Resp  Av.7  Min: 16  Max: 18  SpO2  Av.3 %  Min: 93 %  Max: 98 %  Weight  Av.4 kg (223 lb 8.7 oz)  Min: 101.4 kg (223 lb 8.7 oz)  Max: 101.4 kg (223 lb 8.7 oz)             Vitals([id number])  Temp:  [97.7 °F (36.5 °C)-99.6 °F (37.6 °C)]   Pulse:  []   Resp:  [16-18]   BP: ()/(54-73)   SpO2:  [93 %-98 %]     I & O([id number])    Intake/Output Summary (Last 24 hours) at 3/3/2019 9061  Last data  filed at 3/3/2019 0500  Gross per 24 hour   Intake 500 ml   Output 3 ml   Net 497 ml       Physical Exam:   General: AAOx3. NAD.  HEENT: NCAT. PERRLA. EOMI.     CV: RRR.No M/R/G.   Chest: NL effort. CTAB. No R/R/W.   Abd: +BS x 4. Distended and Tender to mild palpation. No rebound or guarding.   Ext: moving well. +distal pulses  Skin: Intact. No rash. No lesions.   Neuro: CN II-XII intact. No focal deficit.  Psych:  No A/V hallucinations. NL affect. No abnormal behaviors noted    LABS  CBC  Recent Labs   Lab 03/01/19  0555 03/02/19  0530 03/03/19  0517   WBC 11.37 11.24 15.38*   RBC 3.01* 3.08* 2.94*   HGB 9.2* 9.2* 8.9*   HCT 29.1* 29.7* 28.3*   * 575* 598*   MCV 97 96 96   MCH 30.6 29.9 30.3   MCHC 31.6* 31.0* 31.4*       CMP  Recent Labs   Lab 03/01/19  0555 03/02/19  0530 03/03/19  0517   * 131*  131* 131*  131*   K 3.3* 3.4*  3.4* 4.2  4.2   CO2 17* 21*  21* 17*  18*   CL 90* 92*  93* 94*  94*   BUN 45* 43*  42* 45*  45*   CREATININE 9.1* 9.1*  9.1* 10.0*  10.0*   * 193*  190* 242*  244*     Recent Labs   Lab 03/01/19  0555 03/02/19  0530 03/03/19  0517   CALCIUM 8.9 9.1  9.2 9.0  9.1   MG 1.4* 1.5* 1.9   PHOS 6.0* 5.7*  5.8* 5.4*  5.4*     Recent Labs   Lab 03/01/19  0555 03/02/19  0530 03/03/19  0517   PROT 7.1 7.2 7.1   ALBUMIN 2.0* 2.0*  2.0* 1.9*  1.9*   BILITOT 0.6 0.6 0.4   AST 17 13 15   ALKPHOS 143* 142* 128   ALT 5* 6* <5*       LAST HbA1c  Lab Results   Component Value Date    HGBA1C 6.3 (H) 09/12/2018     Micro:  Microbiology Results (last 7 days)     Procedure Component Value Units Date/Time    AFB Culture & Smear [[id number]] Collected:  03/01/19 1542    Order Status:  Completed Specimen:  Body Fluid from Abdomen Updated:  03/02/19 2127     AFB Culture & Smear Culture in progress    Culture, Fluid  (Aerobic) [[id number]] Collected:  03/01/19 1439    Order Status:  Completed Specimen:  Body Fluid from Ascites Updated:  03/02/19 1213     AEROBIC CULTURE - FLUID  No growth     Gram Stain Result Rare WBC's      No organisms seen    C Diff Toxin by PCR [[id number]] Collected:  03/01/19 0855    Order Status:  Completed Updated:  03/02/19 0556     C. diff PCR Negative    Clostridium difficile EIA [[id number]]  (Abnormal) Collected:  03/01/19 0855    Order Status:  Completed Specimen:  Stool Updated:  03/01/19 2057     C. diff Antigen Positive     C difficile Toxins A+B, EIA Negative     Comment: Testing not recommended for children <24 months old.       Fungus culture [[id number]] Collected:  03/01/19 1542    Order Status:  Sent Specimen:  Body Fluid from Abdomen Updated:  03/01/19 2004    Culture, Anaerobe [[id number]] Collected:  03/01/19 1542    Order Status:  Sent Specimen:  Body Fluid from Abdomen Updated:  03/01/19 2004    Blood culture [[id number]] Collected:  02/21/19 1901    Order Status:  Completed Specimen:  Blood from Peripheral, Hand, Left Updated:  02/27/19 0612     Blood Culture, Routine No growth after 5 days.    Blood culture [[id number]] Collected:  02/21/19 1847    Order Status:  Completed Specimen:  Blood from Peripheral, Hand, Right Updated:  02/27/19 0612     Blood Culture, Routine No growth after 5 days.          Assessment/Plan:   49 yo female with history of HTN, DM, PAD, AoCD 2/2 CKD, on PD since 2004 presenting for peritonitis     Peritonitis 2/2 Peritoneal Dialysis  PD initiated on 4/20/04  Hx of 5-6 episodes of Peritonitis in the past  Per Nephro, Cultures [facility]  Received 1 dose of Vanc and Fortaz  On admission, patient was afebrile with no leukocytosis  Blood cultures: NGTD  Consulted Nephrology, appreciate recs  Continue Vancomycin and Fortaz and cefazolin in peritoneal bag,  per Nephro  Leukocytosis on 3/3/19.     ESRD  Continuing PD per nephro  Hypokalemia, hypomagnesium, hyperphosphotemia managed by nephro w/ PD    Hypertension  BP goal <140/80  Blood pressure stable and at baseline for patient      Anemia of  Chronic Disease secondary to CKD  H/H at baseline  BUN/Cr at baseline  Will continue to monitor     DM  A1C (9/12/18): 6.3  AM glucose: 193  Insulin Detemir 200U in peritoneal bag per Nephro  Continue to monitor with POCT glucose QID  Currently on SSI     C Diff  Antigen Positive, toxin neg  Will f/u confirmatory testing    PT/OT: ordered and following  Code: full  Diet: Renal, Diabetic diet  Ppx: dvt: Aspirin, Plavix, SCDs, leander    Dispo: Pending final nephro recs and antibiotics completion for peritonitis.      Loyd Beltre MD  [facility]  03/03/2019

## 2019-03-03 NOTE — NURSING
Received patient upon round 1905H, patient seen sitting at the edge of the bed, conscious , coherent to time, date, place, person and situation. GCS 15. Complaining of abdominal cramps, 9/10, dilaudid administered.With Saline lock right hand gauge 20,  flushed patent with clean and dry dressing. Left AV fistula not positive with thrill and bruit, patient started peritoneal dialysis late  around midnight, PD site clean dry and intact. Uses commode, passed one Still with diarrhea, bowels hyperactive.  Advised patient to call for any assistance. CAll bell within reach. 95% on room air. Bed alarm ON. Refused for telemetry. Safety fall precaution maintained.  Will continue to monitor patient.

## 2019-03-03 NOTE — PLAN OF CARE
Problem: Adult Inpatient Plan of Care  Goal: Plan of Care Review  Outcome: Ongoing (interventions implemented as appropriate)  Stable VS over the night, had 5x loose stool over 24 hours, 3x during the day, 2x during the night. Still with abdominal crmaps. IV line patent.Peritoneal dialysis onging, started late, will be finishing by 0900H, No nausea nor vomiting noted over the night, but appetite is very poor. Free from Fall. Kept on contact precaution.Will endorse to dayshift nurse.

## 2019-03-03 NOTE — PROGRESS NOTES
[facility]  Nephrology  Progress Note    Patient Name: Jenni Todd  MRN: [id number]  Admission Date: 2/21/2019  Hospital Length of Stay: 7 days  Attending Provider: Dominick Bueno MD   Primary Care Physician: Michael Tan Iii, MD  Principal Problem:Peritonitis associated with peritoneal dialysis  Date of service 3/3/2019  Consults     Reason for consult: PD, peritonitis  Subjective:     Interval History: She has persistent abdominal pain, fullness although improving, improved since reduced fill volumes.  Having bowel movements.  No n/v    Review of patient's allergies indicates:   Allergen Reactions    Clindamycin Diarrhea    Flagyl [metronidazole hcl]     Metronidazole      Current Facility-Administered Medications   Medication Frequency    aspirin EC tablet 81 mg Daily    atorvastatin tablet 40 mg QHS    calcitRIOL capsule 0.5 mcg QAM    carvedilol tablet 3.125 mg BID    ceFAZolin 2,000 mg in dianeal low calcium 2.5 % Ca 2.5 mEq/L- Mg 0.5 mEq/L 2,000 mL Once    ceftAZIDime (FORTAZ) 1 g in dextrose 5 % 50 mL IVPB Q12H    cinacalcet tablet 90 mg QHS    clopidogrel tablet 75 mg Daily    dextrose 50% injection 12.5 g PRN    dextrose 50% injection 25 g PRN    epoetin adonay injection 20,000 Units Every Mon, Wed, Fri    fluconazole tablet 200 mg Daily    gabapentin capsule 100 mg QHS    glucagon (human recombinant) injection 1 mg PRN    glucose chewable tablet 16 g PRN    glucose chewable tablet 24 g PRN    hydromorphone (PF) injection 1 mg Q6H PRN    insulin aspart U-100 pen 0-5 Units QID (AC + HS) PRN    Lactobacillus acidoph-L.bulgar 1 million cell tablet 4 tablet TID WM    ondansetron injection 4 mg Q8H PRN    potassium chloride SA CR tablet 20 mEq Daily    promethazine tablet 12.5 mg Q6H PRN    sevelamer carbonate tablet 800 mg TID WM    simethicone chewable tablet 125 mg Q6H PRN    sodium bicarbonate tablet 650 mg BID    sodium chloride 0.9% flush 5 mL PRN     vitamin renal formula (B-complex-vitamin c-folic acid) 1 mg per capsule 1 capsule Daily       Objective:     Vital Signs (Most Recent):  Temp: 98.7 °F (37.1 °C) (03/03/19 1239)  Pulse: 94 (03/03/19 1239)  Resp: 18 (03/03/19 1239)  BP: 108/68 (03/03/19 1239)  SpO2: 96 % (03/03/19 1551)  O2 Device (Oxygen Therapy): room air (03/03/19 1551) Vital Signs (24h Range):  Temp:  [97.7 °F (36.5 °C)-99.6 °F (37.6 °C)] 98.7 °F (37.1 °C)  Pulse:  [] 94  Resp:  [16-18] 18  SpO2:  [93 %-98 %] 96 %  BP: ()/(58-73) 108/68     Weight: 101.4 kg (223 lb 8.7 oz)(onging peritoneal dialysis) (03/03/19 0500)  Body mass index is 34.5 kg/m².  Body surface area is 2.2 meters squared.    I/O last 3 completed shifts:  In: 930 [P.O.:680; I.V.:100; IV Piggyback:150]  Out: 252 [Other:249; Stool:3]    Physical Exam   Constitutional: She is oriented to person, place, and time. She appears well-developed and well-nourished. No distress.   HENT:   Head: Normocephalic and atraumatic.   Eyes: EOM are normal. No scleral icterus.   Cardiovascular: Normal rate and regular rhythm.   Pulmonary/Chest: Effort normal and breath sounds normal. No respiratory distress.   Abdominal: Soft. She exhibits distension. There is tenderness (diffusely tender, no rebound/guarding).   PD catheter   Musculoskeletal: She exhibits no edema or deformity.   Neurological: She is alert and oriented to person, place, and time.   Skin: Skin is warm and dry. No rash noted. She is not diaphoretic.   Psychiatric: She has a normal mood and affect. Her behavior is normal.   Nursing note and vitals reviewed.      Significant Labs:  CBC:   Recent Labs   Lab 03/03/19  0517   WBC 15.38*   RBC 2.94*   HGB 8.9*   HCT 28.3*   *   MCV 96   MCH 30.3   MCHC 31.4*     CMP:   Recent Labs   Lab 03/03/19  0517   *  244*   CALCIUM 9.0  9.1   ALBUMIN 1.9*  1.9*   PROT 7.1   *  131*   K 4.2  4.2   CO2 17*  18*   CL 94*  94*   BUN 45*  45*   CREATININE 10.0*  10.0*    ALKPHOS 128   ALT <5*   AST 15   BILITOT 0.4     Microbiology Results (last 7 days)     Procedure Component Value Units Date/Time    AFB Culture & Smear [[id number]] Collected:  03/01/19 1542    Order Status:  Completed Specimen:  Body Fluid from Abdomen Updated:  03/02/19 2127     AFB Culture & Smear Culture in progress    Culture, Fluid  (Aerobic) [[id number]] Collected:  03/01/19 1439    Order Status:  Completed Specimen:  Body Fluid from Ascites Updated:  03/02/19 1213     AEROBIC CULTURE - FLUID No growth     Gram Stain Result Rare WBC's      No organisms seen    C Diff Toxin by PCR [[id number]] Collected:  03/01/19 0855    Order Status:  Completed Updated:  03/02/19 0556     C. diff PCR Negative    Clostridium difficile EIA [[id number]]  (Abnormal) Collected:  03/01/19 0855    Order Status:  Completed Specimen:  Stool Updated:  03/01/19 2057     C. diff Antigen Positive     C difficile Toxins A+B, EIA Negative     Comment: Testing not recommended for children <24 months old.       Fungus culture [[id number]] Collected:  03/01/19 1542    Order Status:  Sent Specimen:  Body Fluid from Abdomen Updated:  03/01/19 2004    Culture, Anaerobe [[id number]] Collected:  03/01/19 1542    Order Status:  Sent Specimen:  Body Fluid from Abdomen Updated:  03/01/19 2004    Blood culture [[id number]] Collected:  02/21/19 1901    Order Status:  Completed Specimen:  Blood from Peripheral, Hand, Left Updated:  02/27/19 0612     Blood Culture, Routine No growth after 5 days.    Blood culture [[id number]] Collected:  02/21/19 1847    Order Status:  Completed Specimen:  Blood from Peripheral, Hand, Right Updated:  02/27/19 0612     Blood Culture, Routine No growth after 5 days.        No results for input(s): COLORU, CLARITYU, SPECGRAV, PHUR, PROTEINUA, GLUCOSEU, BILIRUBINCON, BLOODU, WBCU, RBCU, BACTERIA, MUCUS, NITRITE, LEUKOCYTESUR, UROBILINOGEN, HYALINECASTS in the last 168 hours.  All labs within the past 24 hours have been  reviewed.        Assessment/Plan:     Active Diagnoses:    Diagnosis Date Noted POA    PRINCIPAL PROBLEM:  Peritonitis associated with peritoneal dialysis [T85.71XA] 02/21/2019 Unknown    Hypotension due to hypovolemia [I95.89, E86.1]  Unknown    Occult GI bleeding [R19.5] 03/01/2019 Unknown    Abdominal pain [R10.9]  Yes    Peritonitis due to infected peritoneal dialysis catheter [T85.71XA, K65.9] 02/21/2019 Yes    Chronic low back pain [M54.5, G89.29] 12/23/2015 Yes    Hypoalbuminemia [E88.09] 07/14/2015 Yes    Anemia in chronic kidney disease, on chronic dialysis [N18.6, D63.1, Z99.2]  Not Applicable    Hypertension [I10]  Yes    Hyperlipidemia [E78.5]  Yes    CAD (coronary artery disease) with recent PCI 12/18/2012 [I25.10]  Yes    Diabetes mellitus, type 2 [E11.9]  Yes    ESRD (end stage renal disease) on PD ( initiated dialysis 04/20/2004) [N18.6] 04/20/2004 Yes      Problems Resolved During this Admission:         1. ESRD - PD  - continue nightly PD, continue lower fill volume to 2.5L from 2.8L 2/2 abdominal discomfort, however will increase number of dwells while reduced volume since clearance is worsening  - renally dose medications for dialysis  Peritonitis - monitor cell counts, continue IP Cefazolin and ceftazidime, monitor vanc levels  Hyponatremia - continue uf with PD, monitor BMP daily, convert IVPB to NS when possible  Hypokalemia - Replace PRN, check level in AM  Hypomagnesemia - replace IV PRN, check level in AM  Acidosis - worsening, increase PO bicarb, check BMP in AM  2. HTN - continue current medications, UF as tolerated on dialysis  3. Anemia of chronic kidney disease - Hgb stable, continue to monitor CBC  4. MBD/secondary hyperparathyroidism - continue sevelamer, phos uncontrolled but coming down, continue to monitor phos  5. Access - PD catherer  6. Nutrition - recommend renal diet, nepro    Thank you for allowing me to participate in the care of your patient.  Please call with  any questions.    Date of service: 3/3/2019  Sita Bennett MD   Nephrology  [facility]  Office [phone]   [facility]

## 2019-03-04 NOTE — ASSESSMENT & PLAN NOTE
- no overt bleeding noted  - abd pain due to peritonitis  - given recent colonoscopy, may benefit from egd; can be done as outpt

## 2019-03-04 NOTE — PLAN OF CARE
Problem: Adult Inpatient Plan of Care  Goal: Plan of Care Review  Outcome: Ongoing (interventions implemented as appropriate)  Pt on RA with documented sats.  Will continue to monitor.   / Nurse notifeid

## 2019-03-04 NOTE — NURSING
Low BP 73/49  reported to Dr. Viky Vallecillo. NS bolus 250cc now ordered. Reported to Zonia Allison RN.

## 2019-03-04 NOTE — PROGRESS NOTES
[facility]  Gastroenterology  Progress Note    Patient Name: Jenni Todd  MRN: [id number]  Admission Date: 2/21/2019  Hospital Length of Stay: 8 days  Code Status: Full Code   Attending Provider: Dominick Bueno MD  Consulting Provider: Rosina Rivera MD  Primary Care Physician: Michael Tan Iii, MD  Principal Problem: Peritonitis associated with peritoneal dialysis      Subjective:     Interval History: Doing ok, senait diet. No vomiting, no overt bleeding. Some residual abd pain but better overall    Review of Systems   Constitutional: Negative for diaphoresis and unexpected weight change.   Respiratory: Negative for apnea and chest tightness.    Cardiovascular: Negative for chest pain and leg swelling.   Gastrointestinal: Positive for abdominal pain. Negative for anal bleeding.     Objective:     Vital Signs (Most Recent):  Temp: 98.2 °F (36.8 °C) (03/04/19 1500)  Pulse: 78 (03/04/19 1722)  Resp: 16 (03/04/19 1722)  BP: (!) 73/49 (03/04/19 1722)  SpO2: 99 % (03/04/19 1545) Vital Signs (24h Range):  Temp:  [97.7 °F (36.5 °C)-98.3 °F (36.8 °C)] 98.2 °F (36.8 °C)  Pulse:  [78-92] 78  Resp:  [16-18] 16  SpO2:  [97 %-100 %] 99 %  BP: ()/(49-67) 73/49     Weight: 101.4 kg (223 lb 8.7 oz)(onging peritoneal dialysis) (03/03/19 0500)  Body mass index is 34.5 kg/m².      Intake/Output Summary (Last 24 hours) at 3/4/2019 1742  Last data filed at 3/4/2019 1645  Gross per 24 hour   Intake 220 ml   Output 850 ml   Net -630 ml       Lines/Drains/Airways     Drain                 Hemodialysis AV Fistula Left upper arm -- days          Peripheral Intravenous Line                 Peripheral IV - Single Lumen 03/04/19 0930 Anterior;Proximal;Right Upper Arm less than 1 day                Physical Exam   Constitutional: She is oriented to person, place, and time. She appears well-developed and well-nourished. No distress.   Eyes: Conjunctivae are normal. No scleral icterus.   Cardiovascular: Normal rate  and regular rhythm.   No murmur heard.  Pulmonary/Chest: Effort normal and breath sounds normal. No stridor. No respiratory distress.   Abdominal: Soft. She exhibits distension. There is tenderness.   Neurological: She is alert and oriented to person, place, and time.   Psychiatric: She has a normal mood and affect. Her behavior is normal.   Nursing note and vitals reviewed.      Significant Labs:  CBC:   Recent Labs   Lab 03/03/19  0517 03/04/19  0434   WBC 15.38* 10.12   HGB 8.9* 9.2*   HCT 28.3* 29.1*   * 537*         Significant Imaging:  Imaging results within the past 24 hours have been reviewed.    Assessment/Plan:     Occult GI bleeding    - no overt bleeding noted  - abd pain due to peritonitis  - given recent colonoscopy, may benefit from egd; can be done as outpt     Abdominal pain    - due to peritonitis  - PD catheter management per nephrology         Thank you for your consult. I will follow-up with patient. Please contact us if you have any additional questions.    Rosina Rivera MD  Gastroenterology  [facility]

## 2019-03-04 NOTE — PLAN OF CARE
03/04/19 1136   Discharge Reassessment   Assessment Type Discharge Planning Reassessment   Provided patient/caregiver education on the expected discharge date and the discharge plan Yes   Do you have any problems affording any of your prescribed medications? No   Discharge Plan A Home with family   Discharge Plan B Home with family   DME Needed Upon Discharge  none   Anticipated Discharge Disposition Home   Can the patient answer the patient profile reliably? Yes, cognitively intact   How does the patient rate their overall health at the present time? Good   Describe the patient's ability to walk at the present time. Minor restrictions or changes   How often would a person be available to care for the patient? Often

## 2019-03-04 NOTE — PHYSICIAN QUERY
PT Name: Jenni Todd  MR #: [id number]     Physician Query Form - Diagnosis Clarification      CDS/: Ruby Wasserman               Contact information: [email or web address]      This form is a permanent document in the medical record.     Query Date: March 4, 2019    By submitting this query, we are merely seeking further clarification of documentation.  Please utilize your independent clinical judgment when addressing the question(s) below.     The medical record contains the following:      Findings Supporting Clinical Information Location in Medical Record   Malnutrition  Malnutrition  Supplements if tolerates    Poor nutrition  Supplements    Hyponatremia  Hypokalemia  Replace K  Metabolic acidosis  Sodium bicarbonate  Metabolic bone disease  Hyperphosphatemia  Binders  Hypomagnesemia  Replace  Anemia  Renal cap    Weight: 99 kg (218 lb 4.1 oz) (03/01/19 0559)  Body mass index is 33.68 kg/m².       Nephrology note 2/28       Nephrology note 3/1                                 Vital signs flow sheet      Please clarify if the __Malnutrition _ diagnosis has been:    [  X] Ruled In; Specify degree;__Severe protein deficient malnourishment___________   [  ] Ruled In, Now Resolved; Specify Degree:________________________   [  ] Ruled Out   [  ] Other/Clarification of findings (please specify):   [  ] Clinically insignificant     [  ] Clinically undetermined     Please document in your progress notes daily for the duration of treatment, until resolved, and include in your discharge summary.

## 2019-03-04 NOTE — PLAN OF CARE
Problem: Adult Inpatient Plan of Care  Goal: Plan of Care Review  Outcome: Ongoing (interventions implemented as appropriate)  Pt on RA with documented sats.   Will continue to monitor.

## 2019-03-04 NOTE — PROGRESS NOTES
Reports nausea, vomiting once, diarrhea once  Peritoneal dialysis  UF approximately 400 cc  Last dwell with cefazolin in progress

## 2019-03-04 NOTE — PLAN OF CARE
"Problem: Adult Inpatient Plan of Care  Goal: Plan of Care Review  Outcome: Ongoing (interventions implemented as appropriate)  Plan of care reviewed patient verbalized understanding. AAOx4, RM air sat's 99%. Laying in bed eyes closed. Pain level 9/10 abdomen throbbing pain medications administered notified MD of BP he stated "map is greater than 73 ok to give.". Blood glucose monitoring per sliding scale. Medications administered. Peritoneal dialysis port intact. Skin tear nickel size on left buttock, mepilex provided. Safety maintained bed in lowest position, call bell within reach, bed alarm on, call bell within reach. Will continue to monitor.       "

## 2019-03-04 NOTE — PLAN OF CARE
VN note: VN cued into pt's room for introduction.  The patient could not see the VN.  The floor nurse tried to wake up the monitor.  It froze up and them went off line VN will continue to be available to patient and intervene prn and check back in with the patient       03/04/19 1039   Type of Frequent Check   Type Patient Rounds   Safety/Activity   Patient Rounds visualized patient;ID band on;bed wheels locked   Safety Promotion/Fall Prevention side rails raised x 2   Safety Precautions emergency equipment at bedside   Positioning   Body Position side-lying, right   Head of Bed (HOB) HOB at 20-30 degrees   Pain/Comfort/Sleep   Preferred Pain Scale number (Numeric Rating Pain Scale)   Comfort/Acceptable Pain Level 3

## 2019-03-04 NOTE — SUBJECTIVE & OBJECTIVE
Subjective:     Interval History: Doing ok, senait diet. No vomiting, no overt bleeding. Some residual abd pain but better overall    Review of Systems   Constitutional: Negative for diaphoresis and unexpected weight change.   Respiratory: Negative for apnea and chest tightness.    Cardiovascular: Negative for chest pain and leg swelling.   Gastrointestinal: Positive for abdominal pain. Negative for anal bleeding.     Objective:     Vital Signs (Most Recent):  Temp: 98.2 °F (36.8 °C) (03/04/19 1500)  Pulse: 78 (03/04/19 1722)  Resp: 16 (03/04/19 1722)  BP: (!) 73/49 (03/04/19 1722)  SpO2: 99 % (03/04/19 1545) Vital Signs (24h Range):  Temp:  [97.7 °F (36.5 °C)-98.3 °F (36.8 °C)] 98.2 °F (36.8 °C)  Pulse:  [78-92] 78  Resp:  [16-18] 16  SpO2:  [97 %-100 %] 99 %  BP: ()/(49-67) 73/49     Weight: 101.4 kg (223 lb 8.7 oz)(onging peritoneal dialysis) (03/03/19 0500)  Body mass index is 34.5 kg/m².      Intake/Output Summary (Last 24 hours) at 3/4/2019 1742  Last data filed at 3/4/2019 1645  Gross per 24 hour   Intake 220 ml   Output 850 ml   Net -630 ml       Lines/Drains/Airways     Drain                 Hemodialysis AV Fistula Left upper arm -- days          Peripheral Intravenous Line                 Peripheral IV - Single Lumen 03/04/19 0930 Anterior;Proximal;Right Upper Arm less than 1 day                Physical Exam   Constitutional: She is oriented to person, place, and time. She appears well-developed and well-nourished. No distress.   Eyes: Conjunctivae are normal. No scleral icterus.   Cardiovascular: Normal rate and regular rhythm.   No murmur heard.  Pulmonary/Chest: Effort normal and breath sounds normal. No stridor. No respiratory distress.   Abdominal: Soft. She exhibits distension. There is tenderness.   Neurological: She is alert and oriented to person, place, and time.   Psychiatric: She has a normal mood and affect. Her behavior is normal.   Nursing note and vitals reviewed.      Significant  Labs:  CBC:   Recent Labs   Lab 03/03/19  0517 03/04/19  0434   WBC 15.38* 10.12   HGB 8.9* 9.2*   HCT 28.3* 29.1*   * 537*         Significant Imaging:  Imaging results within the past 24 hours have been reviewed.

## 2019-03-04 NOTE — PLAN OF CARE
During VN rounding patient denies any questions or concerns at this time.  Patient denies any pain or discomfort. Dialysis nurse at the bedside.  I will continue to monitor the patient and intervene as needed.       03/04/19 3587   Type of Frequent Check   Type Patient Rounds   Safety/Activity   Patient Rounds visualized patient;ID band on;bed wheels locked   Safety Promotion/Fall Prevention side rails raised x 2   Safety Precautions emergency equipment at bedside   Positioning   Body Position side-lying, left   Head of Bed (HOB) HOB at 20-30 degrees   Pain/Comfort/Sleep   Preferred Pain Scale number (Numeric Rating Pain Scale)   Comfort/Acceptable Pain Level 3   Pain Rating (0-10): Rest 0   Pain Rating (0-10): Activity 0

## 2019-03-05 NOTE — NURSING
BP 81/53. Patient states she does not feel weak or dizzy at this time. Patient instructed not to get out of bed at this time. Dr. Beltre at bedside. Made aware of bp and that iv fluids were discontinued at 6AM per Dr. Vallecillo request to stop IV fluids if sbp was above 100. Dr. Beltre states to continue to hold fluids and encouraged patient to drink water.

## 2019-03-05 NOTE — PROGRESS NOTES
Progress Note  Nephrology      Consult Requested By: Dominick Bueno MD      SUBJECTIVE:     Overnight events  Patient is a 48 y.o. female     Patient Active Problem List   Diagnosis    Neuropathy    ESRD (end stage renal disease) on PD ( initiated dialysis 04/20/2004)    FSGS (focal segmental glomerulosclerosis) biopsy proven with collapsing features    Anemia in chronic kidney disease, on chronic dialysis    Hypertension    Hyperlipidemia    Obesity    CAD (coronary artery disease) with recent PCI 12/18/2012    Diabetes mellitus, type 2    Awaiting organ transplant status    Bulging discs - symptomatic     Spinal stenosis of sacral and sacrococcygeal region    Hypertensive renal disease    Hypoalbuminemia    Cerebral infarction due to embolism of middle cerebral artery    Gait abnormality    Chronic low back pain    DDD (degenerative disc disease), lumbar    PAD (peripheral artery disease)    Peritonitis associated with peritoneal dialysis    Peritonitis due to infected peritoneal dialysis catheter    Abdominal pain    Occult GI bleeding    Hypotension due to hypovolemia     Past Medical History:   Diagnosis Date    Abnormal finding on Pap smear, HPV DNA positive 2014    Anemia associated with chronic renal failure     on Epogen    Blood type B+     Bulging discs - symptomatic      CAD (coronary artery disease)     Diabetes mellitus, type 2     ESRD (end stage renal disease) 04/20/2004    FSGS (focal segmental glomerulosclerosis)     with collapsing    Hyperlipidemia     Hypertension 2004    Neuropathy     Obesity     Secondary hyperparathyroidism, renal     TIA (transient ischemic attack)     Uterine fibroid     small uterine               OBJECTIVE:     Vitals:    03/05/19 0900 03/05/19 1127 03/05/19 1300 03/05/19 1623   BP:  123/65  105/62   BP Location:       Patient Position:  Lying  Lying   Pulse:  84  85   Resp:  18  18   Temp:  97.4 °F (36.3 °C)  98.4 °F (36.9 °C)    TempSrc:  Oral  Oral   SpO2: 100%  97% 96%   Weight:       Height:           Temp: 98.4 °F (36.9 °C) (03/05/19 1623)  Pulse: 85 (03/05/19 1623)  Resp: 18 (03/05/19 1623)  BP: 105/62 (03/05/19 1623)  SpO2: 96 % (03/05/19 1623)    Date 03/05/19 0700 - 03/06/19 0659   Shift 2550-2745 2647-7329 7417-9654 24 Hour Total   INTAKE   IV Piggyback  500  500   Shift Total(mL/kg)  500(4.9)  500(4.9)   OUTPUT   Urine(mL/kg/hr) 200(0.2)   200   Shift Total(mL/kg) 200(1.9)   200(1.9)   Weight (kg) 103 103 103 103             Medications:   aspirin  81 mg Oral Daily    atorvastatin  40 mg Oral QHS    calcitRIOL  0.5 mcg Oral QAM    cinacalcet  90 mg Oral QHS    clopidogrel  75 mg Oral Daily    epoetin adonay (PROCRIT) injection  20,000 Units Subcutaneous Every Mon, Wed, Fri    gabapentin  100 mg Oral QHS    Lactobacillus acidoph-L.bulgar  4 tablet Oral TID WM    Dianeal PD with additives   Intraperitoneal Once    potassium chloride  20 mEq Oral Daily    ramelteon  8 mg Oral QHS    sevelamer carbonate  800 mg Oral TID WM    sodium bicarbonate  650 mg Oral BID    vitamin renal formula (B-complex-vitamin c-folic acid)  1 capsule Oral Daily                 Physical Exam:  General appearance: NAD  Weak  Nausea earlier  Poor intake  Lungs: diminished breath sounds  Heart: pulse 85  Abdomen:dustended, soft  Extremities: no edema  Laboratory:  ABG  Labs reviewed  No results found for this or any previous visit (from the past 336 hour(s)).  Recent Results (from the past 336 hour(s))   CBC with Automated Differential    Collection Time: 03/05/19  3:13 AM   Result Value Ref Range    WBC 9.16 3.90 - 12.70 K/uL    Hemoglobin 8.6 (L) 12.0 - 16.0 g/dL    Hematocrit 27.3 (L) 37.0 - 48.5 %    Platelets 476 (H) 150 - 350 K/uL   CBC with Automated Differential    Collection Time: 03/04/19  4:34 AM   Result Value Ref Range    WBC 10.12 3.90 - 12.70 K/uL    Hemoglobin 9.2 (L) 12.0 - 16.0 g/dL    Hematocrit 29.1 (L) 37.0 - 48.5 %     Platelets 537 (H) 150 - 350 K/uL   CBC with Automated Differential    Collection Time: 03/03/19  5:17 AM   Result Value Ref Range    WBC 15.38 (H) 3.90 - 12.70 K/uL    Hemoglobin 8.9 (L) 12.0 - 16.0 g/dL    Hematocrit 28.3 (L) 37.0 - 48.5 %    Platelets 598 (H) 150 - 350 K/uL     Urinalysis  No results for input(s): COLORU, CLARITYU, SPECGRAV, PHUR, PROTEINUA, GLUCOSEU, BILIRUBINCON, BLOODU, WBCU, RBCU, BACTERIA, MUCUS, NITRITE, LEUKOCYTESUR, UROBILINOGEN, HYALINECASTS in the last 24 hours.    Diagnostic Results:  X-Ray: Reviewed  US: Reviewed  Echo: Reviewed  ACCESS    ASSESSMENT/PLAN:   ESRD  On PD  Peritonitis  Cell count peritoneal fluid 798 - improving  Will continue bactrim IV and oxacillin IP  Metabolic bone disease  Hyperphosphatemia  Binders  Hypokalemia  Hypomagnesemia  Replace  Anemia  Epogen  Malnutrition  Supplements  Hypotension  Hold BP meds  Cell count Peritoneal fluid in am

## 2019-03-05 NOTE — PLAN OF CARE
Progress notes reviewed. Round completed. Pt appeared to be asleep. Eyes closed, respirations even and unlabored.

## 2019-03-05 NOTE — PROGRESS NOTES
[facility]  Progress Note    Follow up for: Vomiting      Hospital Stay Day 8    Subjective:  Patient is tolerating her diet. C-diff neg. Leukocytosis resolved this am. Patient continues to have distended abdomen with mild epigastric tenderness due to too much volumen given during PD dialysis overnight. Denies any fever, chills, chest pain, sob.     Scheduled Meds:   aspirin  81 mg Oral Daily    atorvastatin  40 mg Oral QHS    calcitRIOL  0.5 mcg Oral QAM    cinacalcet  90 mg Oral QHS    clopidogrel  75 mg Oral Daily    epoetin adonay (PROCRIT) injection  20,000 Units Subcutaneous Every Mon, Wed, Fri    gabapentin  100 mg Oral QHS    Lactobacillus acidoph-L.bulgar  4 tablet Oral TID WM    magnesium sulfate IVPB  2 g Intravenous Once    Dianeal PD with additives   Intraperitoneal Once    Dianeal PD with additives   Intraperitoneal Once    potassium chloride  20 mEq Oral Daily    potassium chloride  20 mEq Oral Once    ramelteon  8 mg Oral QHS    sevelamer carbonate  800 mg Oral TID WM    sodium bicarbonate  650 mg Oral TID    vitamin renal formula (B-complex-vitamin c-folic acid)  1 capsule Oral Daily     Continuous Infusions:   lactated ringers Stopped (19 0600)     PRN Meds:dextrose 50%, dextrose 50%, glucagon (human recombinant), glucose, glucose, HYDROmorphone, insulin aspart U-100, ondansetron, promethazine, simethicone, sodium chloride 0.9%    Review of patient's allergies indicates:   Allergen Reactions    Clindamycin Diarrhea    Flagyl [metronidazole hcl]     Metronidazole        Objectives:     Vitals(Most Recent)      BP  Min: 72/58  Max: 125/53  Temp  Av.9 °F (36.6 °C)  Min: 97.1 °F (36.2 °C)  Max: 98.3 °F (36.8 °C)  Pulse  Av.9  Min: 78  Max: 89  Resp  Av.7  Min: 16  Max: 18  SpO2  Av.5 %  Min: 98 %  Max: 99 %  Weight  Av kg (227 lb 1.2 oz)  Min: 103 kg (227 lb 1.2 oz)  Max: 103 kg (227 lb 1.2 oz)             Vitals(Mian49c)  Temp:   [97.1 °F (36.2 °C)-98.3 °F (36.8 °C)]   Pulse:  [78-89]   Resp:  [16-18]   BP: ()/(49-58)   SpO2:  [98 %-99 %]     I & O(Snhy23g)    Intake/Output Summary (Last 24 hours) at 3/5/2019 0903  Last data filed at 3/4/2019 1715  Gross per 24 hour   Intake 470 ml   Output 642 ml   Net -172 ml       Physical Exam:   General: AAOx3. NAD.  HEENT: NCAT. PERRLA. EOMI.     CV: RRR.No M/R/G.   Chest: NL effort. CTAB. No R/R/W.   Abd: +BS x 4. Distended and Tender to mild palpation. No rebound or guarding.   Ext: moving well. +distal pulses  Skin: Intact. No rash. No lesions.   Neuro: CN II-XII intact. No focal deficit.  Psych:  No A/V hallucinations. NL affect. No abnormal behaviors noted    LABS  Temp: 98.2 °F (36.8 °C) (03/04/19 1500)  Pulse: 85 (03/04/19 2021)  Resp: 18 (03/04/19 2021)  BP: (!) 88/54 (03/04/19 2021)  SpO2: 98 % (03/04/19 2026)            Date 03/04/19 0700 - 03/05/19 0659   Shift 9647-2363 1573-2297 8662-3952 24 Hour Total   INTAKE   P.O. 345 125   470   Shift Total(mL/kg) 345(3.4) 125(1.2)   470(4.6)   OUTPUT   Other   442   442   Stool 200     200   Shift Total(mL/kg) 200(2) 442(4.4)   642(6.3)   Weight (kg) 101.4 101.4 101.4 101.4                  Medications:   aspirin  81 mg Oral Daily    atorvastatin  40 mg Oral QHS    calcitRIOL  0.5 mcg Oral QAM    carvedilol  3.125 mg Oral BID    Dianeal PD with additives   Intraperitoneal Once    cinacalcet  90 mg Oral QHS    clopidogrel  75 mg Oral Daily    epoetin adonay (PROCRIT) injection  20,000 Units Subcutaneous Every Mon, Wed, Fri    gabapentin  100 mg Oral QHS    Lactobacillus acidoph-L.bulgar  4 tablet Oral TID WM    Dianeal PD with additives   Intraperitoneal Once    potassium chloride  20 mEq Oral Daily    ramelteon  8 mg Oral QHS    sevelamer carbonate  800 mg Oral TID WM    sodium bicarbonate  650 mg Oral TID    vitamin renal formula (B-complex-vitamin c-folic acid)  1 capsule Oral Daily       lactated ringers 50 mL/hr at 03/04/19  2025                     Physical Exam:  General appearance:  NAD  Nausea  Lungs: diminished breath sounds  Heart: pulse 85  Abdomen: distended, soft  Extremities: no edema  Skin: dry  Laboratory:  ABG  Labs reviewed  No results found for this or any previous visit (from the past 336 hour(s)).        Recent Results (from the past 336 hour(s))   CBC with Automated Differential     Collection Time: 03/04/19  4:34 AM   Result Value Ref Range     WBC 10.12 3.90 - 12.70 K/uL     Hemoglobin 9.2 (L) 12.0 - 16.0 g/dL     Hematocrit 29.1 (L) 37.0 - 48.5 %     Platelets 537 (H) 150 - 350 K/uL   CBC with Automated Differential     Collection Time: 03/03/19  5:17 AM   Result Value Ref Range     WBC 15.38 (H) 3.90 - 12.70 K/uL     Hemoglobin 8.9 (L) 12.0 - 16.0 g/dL     Hematocrit 28.3 (L) 37.0 - 48.5 %     Platelets 598 (H) 150 - 350 K/uL   CBC with Automated Differential     Collection Time: 03/02/19  5:30 AM   Result Value Ref Range     WBC 11.24 3.90 - 12.70 K/uL     Hemoglobin 9.2 (L) 12.0 - 16.0 g/dL     Hematocrit 29.7 (L) 37.0 - 48.5 %     Platelets 575 (H) 150 - 350 K/uL      Urinalysis  No results for input(s): COLORU, CLARITYU, SPECGRAV, PHUR, PROTEINUA, GLUCOSEU, BILIRUBINCON, BLOODU, WBCU, RBCU, BACTERIA, MUCUS, NITRITE, LEUKOCYTESUR, UROBILINOGEN, HYALINECASTS in the last 24 hours.     Diagnostic Results:  X-Ray: Reviewed  US: Reviewed  Echo: Reviewed        Assessment/Plan:   49 yo female with history of HTN, DM, PAD, AoCD 2/2 CKD, on PD since 2004 presenting for peritonitis     Peritonitis 2/2 Peritoneal Dialysis  PD initiated on 4/20/04  Hx of 5-6 episodes of Peritonitis in the past  Per Nephro, Cultures taken PTA grew gram positive cocci at [facility]  Received 1 dose of Vanc and Fortaz  On admission, patient was afebrile with no leukocytosis  Blood cultures: NGTD  Consulted Nephrology, appreciate recs  Continue Vancomycin and Fortaz and cefazolin in peritoneal bag,  per Nephro  Leukocytosis on 3/3/19.  Resolved  today.     ESRD  Continuing PD per nephro  Hypokalemia, hypomagnesium, hyperphosphotemia managed by nephro w/ PD    Hypertension  BP goal <140/80  Blood pressure stable and at baseline for patient      Anemia of Chronic Disease secondary to CKD  H/H at baseline  BUN/Cr at baseline  Will continue to monitor     DM  A1C (9/12/18): 6.3  Insulin Detemir 200U in peritoneal bag per Nephro  Continue to monitor with POCT glucose QID  Currently on SSI     C Diff  Antigen Positive, toxin neg  Will f/u confirmatory testing    PT/OT: ordered and following  Code: full  Diet: Renal, Diabetic diet  Ppx: dvt: Aspirin, Plavix, SCDs, leander    Dispo: Spoke with Dr. Ding on the phone, pending sensitibity on initial perotoneal fluid sensitivity. She will add bactrim.      Loyd Beltre MD  [facility]  3/4/19

## 2019-03-05 NOTE — PROGRESS NOTES
Progress Note  Nephrology      Consult Requested By: Dominick Bueno MD      SUBJECTIVE:     Overnight events  Patient is a 48 y.o. female     Patient Active Problem List   Diagnosis    Neuropathy    ESRD (end stage renal disease) on PD ( initiated dialysis 04/20/2004)    FSGS (focal segmental glomerulosclerosis) biopsy proven with collapsing features    Anemia in chronic kidney disease, on chronic dialysis    Hypertension    Hyperlipidemia    Obesity    CAD (coronary artery disease) with recent PCI 12/18/2012    Diabetes mellitus, type 2    Awaiting organ transplant status    Bulging discs - symptomatic     Spinal stenosis of sacral and sacrococcygeal region    Hypertensive renal disease    Hypoalbuminemia    Cerebral infarction due to embolism of middle cerebral artery    Gait abnormality    Chronic low back pain    DDD (degenerative disc disease), lumbar    PAD (peripheral artery disease)    Peritonitis associated with peritoneal dialysis    Peritonitis due to infected peritoneal dialysis catheter    Abdominal pain    Occult GI bleeding    Hypotension due to hypovolemia     Past Medical History:   Diagnosis Date    Abnormal finding on Pap smear, HPV DNA positive 2014    Anemia associated with chronic renal failure     on Epogen    Blood type B+     Bulging discs - symptomatic      CAD (coronary artery disease)     Diabetes mellitus, type 2     ESRD (end stage renal disease) 04/20/2004    FSGS (focal segmental glomerulosclerosis)     with collapsing    Hyperlipidemia     Hypertension 2004    Neuropathy     Obesity     Secondary hyperparathyroidism, renal     TIA (transient ischemic attack)     Uterine fibroid     small uterine               OBJECTIVE:     Vitals:    03/04/19 1845 03/04/19 1900 03/04/19 2021 03/04/19 2026   BP: (!) 78/56 (!) 84/50 (!) 88/54    BP Location:   Left arm    Patient Position:   Sitting    Pulse: 78 82 85    Resp:   18    Temp:       TempSrc:        SpO2:    98%   Weight:       Height:           Temp: 98.2 °F (36.8 °C) (03/04/19 1500)  Pulse: 85 (03/04/19 2021)  Resp: 18 (03/04/19 2021)  BP: (!) 88/54 (03/04/19 2021)  SpO2: 98 % (03/04/19 2026)    Date 03/04/19 0700 - 03/05/19 0659   Shift 2781-0597 9005-3664 3994-5856 24 Hour Total   INTAKE   P.O. 345 125  470   Shift Total(mL/kg) 345(3.4) 125(1.2)  470(4.6)   OUTPUT   Other  442  442   Stool 200   200   Shift Total(mL/kg) 200(2) 442(4.4)  642(6.3)   Weight (kg) 101.4 101.4 101.4 101.4             Medications:   aspirin  81 mg Oral Daily    atorvastatin  40 mg Oral QHS    calcitRIOL  0.5 mcg Oral QAM    carvedilol  3.125 mg Oral BID    Dianeal PD with additives   Intraperitoneal Once    cinacalcet  90 mg Oral QHS    clopidogrel  75 mg Oral Daily    epoetin adonay (PROCRIT) injection  20,000 Units Subcutaneous Every Mon, Wed, Fri    gabapentin  100 mg Oral QHS    Lactobacillus acidoph-L.bulgar  4 tablet Oral TID WM    Dianeal PD with additives   Intraperitoneal Once    potassium chloride  20 mEq Oral Daily    ramelteon  8 mg Oral QHS    sevelamer carbonate  800 mg Oral TID WM    sodium bicarbonate  650 mg Oral TID    vitamin renal formula (B-complex-vitamin c-folic acid)  1 capsule Oral Daily      lactated ringers 50 mL/hr at 03/04/19 2025               Physical Exam:  General appearance:  NAD  Nausea  Lungs: diminished breath sounds  Heart: pulse 85  Abdomen: distended, soft  Extremities: no edema  Skin: dry  Laboratory:  ABG  Labs reviewed  No results found for this or any previous visit (from the past 336 hour(s)).  Recent Results (from the past 336 hour(s))   CBC with Automated Differential    Collection Time: 03/04/19  4:34 AM   Result Value Ref Range    WBC 10.12 3.90 - 12.70 K/uL    Hemoglobin 9.2 (L) 12.0 - 16.0 g/dL    Hematocrit 29.1 (L) 37.0 - 48.5 %    Platelets 537 (H) 150 - 350 K/uL   CBC with Automated Differential    Collection Time: 03/03/19  5:17 AM   Result Value Ref Range     WBC 15.38 (H) 3.90 - 12.70 K/uL    Hemoglobin 8.9 (L) 12.0 - 16.0 g/dL    Hematocrit 28.3 (L) 37.0 - 48.5 %    Platelets 598 (H) 150 - 350 K/uL   CBC with Automated Differential    Collection Time: 03/02/19  5:30 AM   Result Value Ref Range    WBC 11.24 3.90 - 12.70 K/uL    Hemoglobin 9.2 (L) 12.0 - 16.0 g/dL    Hematocrit 29.7 (L) 37.0 - 48.5 %    Platelets 575 (H) 150 - 350 K/uL     Urinalysis  No results for input(s): COLORU, CLARITYU, SPECGRAV, PHUR, PROTEINUA, GLUCOSEU, BILIRUBINCON, BLOODU, WBCU, RBCU, BACTERIA, MUCUS, NITRITE, LEUKOCYTESUR, UROBILINOGEN, HYALINECASTS in the last 24 hours.    Diagnostic Results:  X-Ray: Reviewed  US: Reviewed  Echo: Reviewed  ACCESS    ASSESSMENT/PLAN:     ESRD  Peritonitis  Continue antibiotics  Hypokalemia  Replace  Metabolic bone disease  Hyperphosphatemia  Binders  Metabolic acidosis  Sodium bicarbonate  Hypomagnesemia  Replace  Anemia  Epogen  Poor nutrition  Supplements  Hypotension  IVF  Will follow up  Peritoneal fluid - cell count in am

## 2019-03-05 NOTE — NURSING
Notified Dr. VONDA Vallecillo of pts BP of 78/56 after 2nd saline bolus. MD ordered 500 mL NS bolus now and when complete to start Lactate Ringers 50mL hour cont. New orders noted and reported to GOKUL Busby RN,.

## 2019-03-05 NOTE — PLAN OF CARE
Problem: Adult Inpatient Plan of Care  Goal: Plan of Care Review  Outcome: Ongoing (interventions implemented as appropriate)  Plan of care reviewed with patient. Voiced understanding. Patient refusing telemetry monitor. Mag rider and IV bactrim administered today. BP 80's/50's this am. MD aware and no new orders noted. No acute distress noted at this time. No complaints of weakness or dizziness today. Complaints of intermittent abdominal pain/cramping. IV dilaudid administered per orders. Side rails x3, bed low, call bell within reach. Maintain bed alarm for patient safety. Patient will be monitored overnight.

## 2019-03-05 NOTE — PLAN OF CARE
Problem: Adult Inpatient Plan of Care  Goal: Plan of Care Review  Outcome: Ongoing (interventions implemented as appropriate)  Plan of care reviewed patient verbalized understanding. AAOx4, room air 100%. Laying in bed. IV fluids infused. LR 50cc/hr. Medications administered. Pain 9/10 abdomen. BP has been low 89/53. No nausea or vomiting. Peritoneal dialysis ongoing throughout the night. Discussed with patient of any signs of discomfort during exchanged to notify the nurse. No tele Bed in lowest position, call bell within reach, bed alarm on. Will continue to monitor.

## 2019-03-05 NOTE — PROGRESS NOTES
[facility]  Progress Note    Follow up for: Vomiting      Hospital Stay Day 9      Subjective:  Patient is tolerating po. LR was dc'd by nephro overnight. MAP at 77. BG 200s. The patient denies any abd pain this am, however, she's frustrated with the progress. Reassurance given. Denies any fever, chills, chest pain, sob.     Scheduled Meds:   aspirin  81 mg Oral Daily    atorvastatin  40 mg Oral QHS    calcitRIOL  0.5 mcg Oral QAM    cinacalcet  90 mg Oral QHS    clopidogrel  75 mg Oral Daily    epoetin adonay (PROCRIT) injection  20,000 Units Subcutaneous Every Mon, Wed, Fri    gabapentin  100 mg Oral QHS    Lactobacillus acidoph-L.bulgar  4 tablet Oral TID WM    magnesium sulfate IVPB  2 g Intravenous Once    Dianeal PD with additives   Intraperitoneal Once    Dianeal PD with additives   Intraperitoneal Once    potassium chloride  20 mEq Oral Daily    potassium chloride  20 mEq Oral Once    ramelteon  8 mg Oral QHS    sevelamer carbonate  800 mg Oral TID WM    sodium bicarbonate  650 mg Oral TID    vitamin renal formula (B-complex-vitamin c-folic acid)  1 capsule Oral Daily     Continuous Infusions:   lactated ringers Stopped (19 0600)     PRN Meds:dextrose 50%, dextrose 50%, glucagon (human recombinant), glucose, glucose, HYDROmorphone, insulin aspart U-100, ondansetron, promethazine, simethicone, sodium chloride 0.9%    Review of patient's allergies indicates:   Allergen Reactions    Clindamycin Diarrhea    Flagyl [metronidazole hcl]     Metronidazole        Objectives:     Vitals(Most Recent)      BP  Min: 72/58  Max: 125/53  Temp  Av.9 °F (36.6 °C)  Min: 97.1 °F (36.2 °C)  Max: 98.3 °F (36.8 °C)  Pulse  Av.9  Min: 78  Max: 89  Resp  Av.7  Min: 16  Max: 18  SpO2  Av.5 %  Min: 98 %  Max: 99 %  Weight  Av kg (227 lb 1.2 oz)  Min: 103 kg (227 lb 1.2 oz)  Max: 103 kg (227 lb 1.2 oz)             Vitals([id number])  Temp:  [97.1 °F (36.2 °C)-98.3  °F (36.8 °C)]   Pulse:  [78-89]   Resp:  [16-18]   BP: ()/(49-58)   SpO2:  [98 %-99 %]     I & O(Rzfr53g)    Intake/Output Summary (Last 24 hours) at 3/5/2019 0907  Last data filed at 3/4/2019 1715  Gross per 24 hour   Intake 470 ml   Output 642 ml   Net -172 ml       Physical Exam:   General: AAOx3. NAD.  HEENT: NCAT. PERRLA. EOMI.     CV: RRR.No M/R/G.   Chest: NL effort. CTAB. No R/R/W.   Abd: +BS x 4. Distended and Tender to mild palpation. No rebound or guarding.   Ext: moving well. +distal pulses  Skin: Intact. No rash. No lesions.   Neuro: CN II-XII intact. No focal deficit.  Psych:  No A/V hallucinations. NL affect. No abnormal behaviors noted    LABS  CBC  Recent Labs   Lab 03/03/19 0517 03/04/19 0434 03/05/19 0313   WBC 15.38* 10.12 9.16   RBC 2.94* 3.03* 2.83*   HGB 8.9* 9.2* 8.6*   HCT 28.3* 29.1* 27.3*   * 537* 476*   MCV 96 96 97   MCH 30.3 30.4 30.4   MCHC 31.4* 31.6* 31.5*       CMP  Recent Labs   Lab 03/03/19 0517 03/04/19 0434 03/05/19 0313   *  131* 133* 132*   K 4.2  4.2 3.2* 3.2*   CO2 17*  18* 24 25   CL 94*  94* 93* 92*   BUN 45*  45* 41* 37*   CREATININE 10.0*  10.0* 9.8* 8.2*   *  244* 238* 230*     Recent Labs   Lab 03/03/19 0517 03/04/19 0434 03/05/19 0313   CALCIUM 9.0  9.1 8.8 8.2*   MG 1.9 1.4* 1.4*   PHOS 5.4*  5.4* 5.2* 4.9*     Recent Labs   Lab 03/03/19 0517 03/04/19  0434 03/05/19  0313   PROT 7.1 6.8 6.5   ALBUMIN 1.9*  1.9* 1.9* 1.8*   BILITOT 0.4 0.4 0.3   AST 15 13 12   ALKPHOS 128 115 117   ALT <5* <5* <5*       LAST HbA1c  Lab Results   Component Value Date    HGBA1C 6.3 (H) 09/12/2018     Micro:  Microbiology Results (last 7 days)     Procedure Component Value Units Date/Time    Blood culture [[id number]] Collected:  03/03/19 1625    Order Status:  Completed Specimen:  Blood Updated:  03/04/19 2012     Blood Culture, Routine No Growth to date     Blood Culture, Routine No Growth to date    AFB Culture & Smear [[id number]]  Collected:  03/01/19 1542    Order Status:  Completed Specimen:  Body Fluid from Abdomen Updated:  03/04/19 1604     AFB Culture & Smear Culture in progress     AFB CULTURE STAIN No acid fast bacilli seen.    Culture, Fluid  (Aerobic) [[id number]] Collected:  03/01/19 1439    Order Status:  Completed Specimen:  Body Fluid from Ascites Updated:  03/04/19 1238     AEROBIC CULTURE - FLUID Further report to follow     Gram Stain Result Rare WBC's      No organisms seen    Culture, Anaerobe [[id number]] Collected:  03/01/19 1542    Order Status:  Completed Specimen:  Body Fluid from Abdomen Updated:  03/04/19 0747     Anaerobic Culture Culture in progress    C Diff Toxin by PCR [[id number]] Collected:  03/01/19 0855    Order Status:  Completed Updated:  03/02/19 0556     C. diff PCR Negative    Clostridium difficile EIA [[id number]]  (Abnormal) Collected:  03/01/19 0855    Order Status:  Completed Specimen:  Stool Updated:  03/01/19 2057     C. diff Antigen Positive     C difficile Toxins A+B, EIA Negative     Comment: Testing not recommended for children <24 months old.       Fungus culture [[id number]] Collected:  03/01/19 1542    Order Status:  Sent Specimen:  Body Fluid from Abdomen Updated:  03/01/19 2004    Blood culture [[id number]] Collected:  02/21/19 1901    Order Status:  Completed Specimen:  Blood from Peripheral, Hand, Left Updated:  02/27/19 0612     Blood Culture, Routine No growth after 5 days.    Blood culture [[id number]] Collected:  02/21/19 1847    Order Status:  Completed Specimen:  Blood from Peripheral, Hand, Right Updated:  02/27/19 0612     Blood Culture, Routine No growth after 5 days.          Assessment/Plan:   49 yo female with history of HTN, DM, PAD, AoCD 2/2 CKD, on PD since 2004 presenting for peritonitis     Peritonitis 2/2 Peritoneal Dialysis  PD initiated on 4/20/04  Hx of 5-6 episodes of Peritonitis in the past  Per Nephro, Cultures taken PTA grew gram positive cocci at [facility]  Received 1  dose of Vanc and Fortaz  On admission, patient was afebrile with no leukocytosis  Blood cultures: NGTD  Consulted Nephrology, appreciate recs  Discontinued Fortaz and cefazolin in peritoneal bag,  per Nephro on 3/5  Leukocytosis on 3/3/19.  Resolved   Day 1 on 3/5/19 started on oxacillin via PD and bactrim via IV, managed by nephro as well.     ESRD  Continuing PD per nephro  Hypokalemia, hypomagnesium, hyperphosphotemia managed by nephro w/ PD    Hypertension  BP goal <140/80  Blood pressure stable and at baseline for patient      Anemia of Chronic Disease secondary to CKD  H/H at baseline  BUN/Cr at baseline  Will continue to monitor     DM  A1C (9/12/18): 6.3  AM glucose: 193  Insulin Detemir 200U in peritoneal bag per Nephro  Continue to monitor with POCT glucose QID  Currently on SSI     C Diff  Antigen Positive, toxin neg  Will f/u confirmatory testing    PT/OT: ordered and following  Code: full  Diet: Renal, Diabetic diet  Ppx: dvt: Aspirin, Plavix, SCDs, leander    Dispo: continue to follow nephro recs and antibiotics completion for peritonitis. Pending PD fluid sensitivity. New abx regimen.      Loyd Beltre MD  [facility]  03/05/2019

## 2019-03-06 NOTE — PLAN OF CARE
Cued into patient's room.  Patient not responding to introduction and permission inquiry.  Patient resting comfortably in bed with eyes closed; respirations even and unlabored.  No distress noted.

## 2019-03-06 NOTE — PROGRESS NOTES
Progress Note  Nephrology      Consult Requested By: Dominick Bueno MD      SUBJECTIVE:     Overnight events  Patient is a 48 y.o. female     Patient Active Problem List   Diagnosis    Neuropathy    ESRD (end stage renal disease) on PD ( initiated dialysis 04/20/2004)    FSGS (focal segmental glomerulosclerosis) biopsy proven with collapsing features    Anemia in chronic kidney disease, on chronic dialysis    Hypertension    Hyperlipidemia    Obesity    CAD (coronary artery disease) with recent PCI 12/18/2012    Diabetes mellitus, type 2    Awaiting organ transplant status    Bulging discs - symptomatic     Spinal stenosis of sacral and sacrococcygeal region    Hypertensive renal disease    Hypoalbuminemia    Cerebral infarction due to embolism of middle cerebral artery    Gait abnormality    Chronic low back pain    DDD (degenerative disc disease), lumbar    PAD (peripheral artery disease)    Peritonitis associated with peritoneal dialysis    Peritonitis due to infected peritoneal dialysis catheter    Abdominal pain    Occult GI bleeding    Hypotension due to hypovolemia     Past Medical History:   Diagnosis Date    Abnormal finding on Pap smear, HPV DNA positive 2014    Anemia associated with chronic renal failure     on Epogen    Blood type B+     Bulging discs - symptomatic      CAD (coronary artery disease)     Diabetes mellitus, type 2     ESRD (end stage renal disease) 04/20/2004    FSGS (focal segmental glomerulosclerosis)     with collapsing    Hyperlipidemia     Hypertension 2004    Neuropathy     Obesity     Secondary hyperparathyroidism, renal     TIA (transient ischemic attack)     Uterine fibroid     small uterine               OBJECTIVE:     Vitals:    03/06/19 0628 03/06/19 0714 03/06/19 0730 03/06/19 1113   BP: 100/60 (!) 111/57  (!) 86/51   BP Location: Right arm      Patient Position: Lying Lying  Lying   Pulse: 82 85  89   Resp: 20 18  17   Temp: 97.9 °F  (36.6 °C) 97.3 °F (36.3 °C)  99.4 °F (37.4 °C)   TempSrc: Oral Oral  Oral   SpO2: 96%  96%    Weight:       Height:           Temp: 99.4 °F (37.4 °C) (03/06/19 1113)  Pulse: 89 (03/06/19 1113)  Resp: 17 (03/06/19 1113)  BP: (!) 86/51 (03/06/19 1113)  SpO2: 96 % (03/06/19 0730)               Medications:   aspirin  81 mg Oral Daily    atorvastatin  40 mg Oral QHS    calcitRIOL  0.5 mcg Oral QAM    cinacalcet  90 mg Oral QHS    clopidogrel  75 mg Oral Daily    epoetin adonay (PROCRIT) injection  20,000 Units Subcutaneous Every Mon, Wed, Fri    Lactobacillus acidoph-L.bulgar  4 tablet Oral TID WM    Dianeal PD with additives   Intraperitoneal Once    Dianeal PD with additives   Intraperitoneal Once    potassium chloride  20 mEq Oral Daily    ramelteon  8 mg Oral QHS    sevelamer carbonate  800 mg Oral TID WM    sodium bicarbonate  650 mg Oral BID    trimethoprim-sulfamethoxasole (BACTRIM) IVPB (fixed ratio)  500 mg Intravenous Once    vitamin renal formula (B-complex-vitamin c-folic acid)  1 capsule Oral Daily                 Physical Exam:  General appearance:NAD  No nausea  No vomiting  No diarrhea today  Lungs: diminished breath sounds  Heart: pulse 89  Abdomen: soft, distended  Extremities: no edema  Laboratory:  ABG  Labs reviewed  No results found for this or any previous visit (from the past 336 hour(s)).  Recent Results (from the past 336 hour(s))   CBC with Automated Differential    Collection Time: 03/06/19  4:00 AM   Result Value Ref Range    WBC 10.62 3.90 - 12.70 K/uL    Hemoglobin 8.7 (L) 12.0 - 16.0 g/dL    Hematocrit 28.5 (L) 37.0 - 48.5 %    Platelets 464 (H) 150 - 350 K/uL   CBC with Automated Differential    Collection Time: 03/05/19  3:13 AM   Result Value Ref Range    WBC 9.16 3.90 - 12.70 K/uL    Hemoglobin 8.6 (L) 12.0 - 16.0 g/dL    Hematocrit 27.3 (L) 37.0 - 48.5 %    Platelets 476 (H) 150 - 350 K/uL   CBC with Automated Differential    Collection Time: 03/04/19  4:34 AM   Result  Value Ref Range    WBC 10.12 3.90 - 12.70 K/uL    Hemoglobin 9.2 (L) 12.0 - 16.0 g/dL    Hematocrit 29.1 (L) 37.0 - 48.5 %    Platelets 537 (H) 150 - 350 K/uL     Urinalysis  No results for input(s): COLORU, CLARITYU, SPECGRAV, PHUR, PROTEINUA, GLUCOSEU, BILIRUBINCON, BLOODU, WBCU, RBCU, BACTERIA, MUCUS, NITRITE, LEUKOCYTESUR, UROBILINOGEN, HYALINECASTS in the last 24 hours.    Diagnostic Results:  X-Ray: Reviewed  US: Reviewed  Echo: Reviewed  ACCESS    ASSESSMENT/PLAN:       ESRD  On PD  Peritonitis  Cell count peritoneal fluid 798 - yesterday  Will continue bactrim IV and oxacillin IP  Hypopnatremia  Metabolic bone disease  Hypomagnesemia  Replace  Anemia  Epogen  Malnutrition  Supplements  Hypotension  Hold BP meds  Cell count Peritoneal fluid daily  Discussed about hemodialysis ( albumin 1.7)  Patient wants to think about hemodialysis

## 2019-03-06 NOTE — PLAN OF CARE
Problem: Oral Intake Inadequate  Goal: Improved Oral Intake  Outcome: Ongoing (interventions implemented as appropriate)  Recommendation:   1. Encourage good intake of meals & Boost.    Goals:   Pt will consume at least 50-75% intake at meals  Nutrition Goal Status: new  Communication of RD Recs: reviewed with physician

## 2019-03-06 NOTE — PROGRESS NOTES
[facility] Medicine  Progress Note    Follow up for: Vomiting      Hospital Stay Day 10      Subjective:  Pt said she is doing better. The patient denies any abd pain this am, however, she's frustrated with the progress. Reassurance given. Denies any fever, chills, chest pain, sob. WBC trending down and PD fluid WBC trending down.     Scheduled Meds:   aspirin  81 mg Oral Daily    atorvastatin  40 mg Oral QHS    calcitRIOL  0.5 mcg Oral QAM    cinacalcet  90 mg Oral QHS    clopidogrel  75 mg Oral Daily    epoetin adonay (PROCRIT) injection  20,000 Units Subcutaneous Every Mon, Wed, Fri    Lactobacillus acidoph-L.bulgar  4 tablet Oral TID WM    Dianeal PD with additives   Intraperitoneal Once    potassium chloride  20 mEq Oral Daily    ramelteon  8 mg Oral QHS    sevelamer carbonate  800 mg Oral TID WM    sodium bicarbonate  650 mg Oral BID    vitamin renal formula (B-complex-vitamin c-folic acid)  1 capsule Oral Daily     Continuous Infusions:    PRN Meds:dextrose 50%, dextrose 50%, glucagon (human recombinant), glucose, glucose, HYDROmorphone, insulin aspart U-100, ondansetron, promethazine, simethicone, sodium chloride 0.9%    Review of patient's allergies indicates:   Allergen Reactions    Clindamycin Diarrhea    Flagyl [metronidazole hcl]     Metronidazole        Objectives:     Vitals(Most Recent)      BP  Min: 90/60  Max: 123/65  Temp  Av.5 °F (36.4 °C)  Min: 97 °F (36.1 °C)  Max: 98.4 °F (36.9 °C)  Pulse  Av  Min: 82  Max: 86  Resp  Av.8  Min: 18  Max: 20  SpO2  Av.2 %  Min: 96 %  Max: 97 %  Weight  Av kg (229 lb 4.5 oz)  Min: 104 kg (229 lb 4.5 oz)  Max: 104 kg (229 lb 4.5 oz)             Vitals(Btoi38f)  Temp:  [97 °F (36.1 °C)-98.4 °F (36.9 °C)]   Pulse:  [82-86]   Resp:  [18-20]   BP: ()/(57-65)   SpO2:  [96 %-97 %]     I & O(Iofp11f)    Intake/Output Summary (Last 24 hours) at 3/6/2019 0958  Last data filed at 3/6/2019 0600  Gross per 24 hour    Intake 1500 ml   Output 201 ml   Net 1299 ml       Physical Exam:   General: AAOx3. NAD.  HEENT: NCAT. PERRLA. EOMI.     CV: RRR.No M/R/G.   Chest: NL effort. CTAB. No R/R/W.   Abd: +BS x 4. Distended and Tender to mild palpation. No rebound or guarding.   Ext: moving well. +distal pulses  Skin: Intact. No rash. No lesions.   Neuro: CN II-XII intact. No focal deficit.  Psych:  No A/V hallucinations. NL affect. No abnormal behaviors noted    LABS  CBC  Recent Labs   Lab 03/04/19 0434 03/05/19 0313 03/06/19  0400   WBC 10.12 9.16 10.62   RBC 3.03* 2.83* 2.93*   HGB 9.2* 8.6* 8.7*   HCT 29.1* 27.3* 28.5*   * 476* 464*   MCV 96 97 97   MCH 30.4 30.4 29.7   MCHC 31.6* 31.5* 30.5*       CMP  Recent Labs   Lab 03/04/19 0434 03/05/19 0313 03/06/19  0400   * 132* 131*  131*   K 3.2* 3.2* 3.8  3.8   CO2 24 25 22*  23   CL 93* 92* 92*  91*   BUN 41* 37* 39*  38*   CREATININE 9.8* 8.2* 8.7*  8.7*   * 230* 193*  193*     Recent Labs   Lab 03/04/19 0434 03/05/19 0313 03/06/19  0400   CALCIUM 8.8 8.2* 7.8*  7.8*   MG 1.4* 1.4* 1.7   PHOS 5.2* 4.9* 4.4  4.5     Recent Labs   Lab 03/04/19 0434 03/05/19 0313 03/06/19  0400   PROT 6.8 6.5 6.4   ALBUMIN 1.9* 1.8* 1.7*  1.8*   BILITOT 0.4 0.3 0.3   AST 13 12 12   ALKPHOS 115 117 114   ALT <5* <5* <5*       LAST HbA1c  Lab Results   Component Value Date    HGBA1C 6.3 (H) 09/12/2018     Micro:  Microbiology Results (last 7 days)     Procedure Component Value Units Date/Time    Fungus culture [[id number]] Collected:  03/01/19 1542    Order Status:  Completed Specimen:  Body Fluid from Abdomen Updated:  03/06/19 0857     Fungus (Mycology) Culture Culture in progress    Blood culture [[id number]] Collected:  03/03/19 1625    Order Status:  Completed Specimen:  Blood Updated:  03/05/19 2012     Blood Culture, Routine No Growth to date     Blood Culture, Routine No Growth to date     Blood Culture, Routine No Growth to date    AFB Culture & Smear  [[id number]] Collected:  03/01/19 1542    Order Status:  Completed Specimen:  Body Fluid from Abdomen Updated:  03/04/19 1604     AFB Culture & Smear Culture in progress     AFB CULTURE STAIN No acid fast bacilli seen.    Culture, Fluid  (Aerobic) [[id number]] Collected:  03/01/19 1439    Order Status:  Completed Specimen:  Body Fluid from Ascites Updated:  03/04/19 1238     AEROBIC CULTURE - FLUID Further report to follow     Gram Stain Result Rare WBC's      No organisms seen    Culture, Anaerobe [[id number]] Collected:  03/01/19 1542    Order Status:  Completed Specimen:  Body Fluid from Abdomen Updated:  03/04/19 0747     Anaerobic Culture Culture in progress    C Diff Toxin by PCR [[id number]] Collected:  03/01/19 0855    Order Status:  Completed Updated:  03/02/19 0556     C. diff PCR Negative    Clostridium difficile EIA [[id number]]  (Abnormal) Collected:  03/01/19 0855    Order Status:  Completed Specimen:  Stool Updated:  03/01/19 2057     C. diff Antigen Positive     C difficile Toxins A+B, EIA Negative     Comment: Testing not recommended for children <24 months old.             Assessment/Plan:   47 yo female with history of HTN, DM, PAD, AoCD 2/2 CKD, on PD since 2004 presenting for peritonitis     Peritonitis 2/2 Peritoneal Dialysis  PD initiated on 4/20/04  Hx of 5-6 episodes of Peritonitis in the past  Per Nephro, Cultures taken PTA grew gram positive cocci at [facility]  Received 1 dose of Vanc and Fortaz  On admission, patient was afebrile with no leukocytosis  Blood cultures: NGTD  Consulted Nephrology, appreciate recs  Discontinued Fortaz and cefazolin in peritoneal bag,  per Nephro on 3/5  Leukocytosis on 3/3/19.  Resolved   Day 1 on 3/5/19 started on oxacillin via PD and bactrim via IV, managed by nephro as well.     ESRD  Continuing PD per nephro  Hypokalemia, hypomagnesium, hyperphosphotemia managed by nephro w/ PD    Hypertension  BP goal <140/80  Blood pressure stable and at baseline for patient       Anemia of Chronic Disease secondary to CKD  H/H at baseline  BUN/Cr at baseline  Will continue to monitor     DM  A1C (9/12/18): 6.3  AM glucose: 193  Insulin Detemir 200U in peritoneal bag per Nephro  Continue to monitor with POCT glucose QID  Currently on SSI     C Diff  Antigen Positive, toxin neg  Will f/u confirmatory testing    PT/OT: ordered and following  Code: full  Diet: Renal, Diabetic diet  Ppx: dvt: Aspirin, Plavix, SCDs, leander    Dispo: continue to follow nephro recs and antibiotics completion for peritonitis. Pending PD fluid sensitivity. New abx regimen started on 3/5. PD fluid wbc trending down. Improving..      Loyd Beltre MD  [facility]  03/06/2019

## 2019-03-06 NOTE — NURSING
Received patient upon round 1915H, patient seen sitting at the edge of the bed, conscious , coherent to time, date, place, person and situation. GCS 15. Complaining of abdominal cramps, 8/10, dilaudid administered.With Saline lock right upper arm gauge 22,  flushed patent with clean and dry dressing. Left AV fistula not positive with thrill and bruit, patient started peritoneal dialysis late  around 2100H ,PD site clean dry and intact. Uses commode, passed one clear vomitus around 0145H, Zofran IV given, patient denies nausea, she said probably from the late dinner she had, spaghetti. With less bowel movement, bowels still hyperactive.  Advised patient to call for any assistance. CAll bell within reach. 95% on room air. Bed alarm ON. Refused for telemetry. Safety fall precaution maintained.  Will continue to monitor patient.

## 2019-03-06 NOTE — PATIENT CARE CONFERENCE
"[facility]  Adult Nutrition  Progress Note    SUMMARY       Recommendations    Recommendation:   1. Encourage good intake of meals & Boost.    Goals:   Pt will consume at least 50-75% intake at meals  Nutrition Goal Status: new  Communication of RD Recs: reviewed with physician    Reason for Assessment  Reason For Assessment: length of stay  Diagnosis: (peritonitis)  Relevant Medical History: ESRD, HTN, HLD, obesity, CAD, neuropathy, DM, TIA, ORIF L hip  General Information Comments: Pt on dysphagia soft [place] with Boost Plus. Pt receives PD at home. Reports decreased appetite and does not like hospital food. NFPE not warranted-pt nourished.  Nutrition Discharge Planning: pt to d/c on dysphagia [place] diet    Nutrition Risk Screen  Nutrition Risk Screen: no indicators present    Nutrition/Diet History  Food Preferences: no Presybeterian or cultural food prefs identified  Spiritual, Cultural Beliefs, Sabianism Practices, Values that Affect Care: no  Factors Affecting Nutritional Intake: decreased appetite    Anthropometrics  Temp: 99.4 °F (37.4 °C)  Height Method: Stated  Height: 5' 7.5" (171.5 cm)  Height (inches): 67.5 in  Weight Method: Bed Scale  Weight: 104 kg (229 lb 4.5 oz)  Weight (lb): 229.28 lb  Ideal Body Weight (IBW), Female: 137.5 lb  % Ideal Body Weight, Female (lb): 166.75 lb  BMI (Calculated): 35.5  BMI Grade: 35 - 39.9 - obesity - grade II  Usual Body Weight (UBW), k.7 kg  % Usual Body Weight: 106.67  % Weight Change From Usual Weight: 6.45 %     Lab/Procedures/Meds  Pertinent Labs Reviewed: reviewed  Pertinent Labs Comments: Na 131L, BUN 38H, Glu 193H, Ca 7.8L, Alb 1.8L  Pertinent Medications Reviewed: reviewed  Pertinent Medications Comments: aspirin, lactobacillus, renal vitamin    Estimated/Assessed Needs  Weight Used For Calorie Calculations: 62.5 kg (137 lb 12.6 oz)(IBW)  Energy Calorie Requirements (kcal): 1875 (30 kcal/kg)  Energy Need Method: Kcal/kg  Protein Requirements: " 81g (1.3g/kg)  Weight Used For Protein Calculations: 62.5 kg (137 lb 12.6 oz)(IBW)  Estimated Fluid Requirement Method: RDA Method  RDA Method (mL): 1875     Nutrition Prescription Ordered  Current Diet Order: dysphagia soft [place]  Oral Nutrition Supplement: Boost Plus    Evaluation of Received Nutrient/Fluid Intake  I/O: 1740/201  Energy Calories Required: not meeting needs  Protein Required: not meeting needs  Fluid Required: not meeting needs  Comments: LBM 3/6  Tolerance: tolerating  % Intake of Estimated Energy Needs: 25 - 50 %  % Meal Intake: 25 - 50 %    Nutrition Risk  Level of Risk/Frequency of Follow-up: (2xweekly)     Assessment and Plan  Nutrition Problem  Inadequate energy intake    Related to (etiology):   Decreased appetite    Signs and Symptoms (as evidenced by):   Poor intake at meals    Interventions:  Commercial  beverage    Nutrition Diagnosis Status:   New     Monitor and Evaluation  Food and Nutrient Intake: energy intake  Food and Nutrient Adminstration: diet order  Physical Activity and Function: nutrition-related ADLs and IADLs  Anthropometric Measurements: weight  Biochemical Data, Medical Tests and Procedures: electrolyte and renal panel  Nutrition-Focused Physical Findings: overall appearance     Malnutrition Assessment  NFPE not warranted-pt nourished      Nutrition Follow-Up  RD Follow-up?: Yes

## 2019-03-06 NOTE — PLAN OF CARE
Problem: Adult Inpatient Plan of Care  Goal: Patient-Specific Goal (Individualization)  Outcome: Ongoing (interventions implemented as appropriate)  Plan of care reviewed with patient. Voiced understanding. Patient refusing telemetry monitor. MD aware. Peritoneal dialysis running at this time. Bactrim and mag rider administered per orders. No acute distress noted at this time. Side rails x3, bed low, call bell within reach. Maintain bed alarm for patient safety. Patient will be monitored overnight.

## 2019-03-06 NOTE — PLAN OF CARE
VN cued into pt's room for introduction. VN informed pt that VN would be working along side bedside nurse and PCT throughout shift. Level of present pain assessed. At present no distress noted. Thoroughly discussed today's plan of care and up comingdischarge. Discussed with patient High fall risk protocol and interventions that have been initiated and cont be in place for safety. Patient verbalized clear understanding and cooperation using teach back method. Bed alarm presently activated and in use. Will cont to be available to patient and intervene prn.

## 2019-03-06 NOTE — PLAN OF CARE
Problem: Adult Inpatient Plan of Care  Goal: Plan of Care Review  Outcome: Ongoing (interventions implemented as appropriate)  Stable VS over the night, had 1 small soft stool over the night. Still with mild abdominal cramps. IV line patent.Peritoneal dialysis ongoing. One vomiting episode no nausea noted, fully relieved with one dose of IV Zofran. Free from Fall. Will endorse to dayshift nurse.

## 2019-03-07 NOTE — PLAN OF CARE
VN note: VN cued into pt's room for introduction. VN asked permission to turn camera. No response from patient. VN turned camera, no family at bedside. Lights dim, patient resting at this time. VN will continue to follow.     03/07/19 1017   Type of Frequent Check   Type Patient Rounds;Other (see comments)  (VN Rounds, Patient asleep)   Safety/Activity   Patient Rounds bed in low position;visualized patient   Safety Promotion/Fall Prevention side rails raised x 2   Activity Management activity adjusted per tolerance   Positioning   Body Position side-lying, right;positioned/repositioned independently   Head of Bed (HOB) HOB at 30-45 degrees   Assessments (Pre/Post)   Level of Consciousness (AVPU) (Patient asleep)

## 2019-03-07 NOTE — SIGNIFICANT EVENT
Patient's BP during PD 73/40 MAP 51. Patient given LR 500ml bolus. BP now stable. Patient doing well.     Hayden Padilla MD  [employer] [facility], PGY-1

## 2019-03-07 NOTE — PROGRESS NOTES
[facility] Medicine  Progress Note    Follow up for: Vomiting      Hospital Stay Day 11      Subjective:  Pt said she continues to do better. She received 500cc LR from night resident to help with MAP. She is updated on the PD fluid culture obtained during the admission.  The patient denies any abd pain this am, however, she's frustrated with the progress. Reassurance given. Denies any fever, chills, chest pain, sob. WBC trending down and PD fluid WBC trending down.     Scheduled Meds:   aspirin  81 mg Oral Daily    atorvastatin  40 mg Oral QHS    calcitRIOL  0.5 mcg Oral QAM    cinacalcet  90 mg Oral QHS    clopidogrel  75 mg Oral Daily    epoetin adonay (PROCRIT) injection  20,000 Units Subcutaneous Every Mon, Wed, Fri    Lactobacillus acidoph-L.bulgar  4 tablet Oral TID WM    Dianeal PD with additives   Intraperitoneal Once    Dianeal PD with additives   Intraperitoneal Once    potassium chloride  20 mEq Oral Daily    ramelteon  8 mg Oral QHS    sevelamer carbonate  800 mg Oral TID WM    sodium bicarbonate  650 mg Oral BID    trimethoprim-sulfamethoxasole (BACTRIM) IVPB (fixed ratio)  500 mg Intravenous Q24H    vitamin renal formula (B-complex-vitamin c-folic acid)  1 capsule Oral Daily     Continuous Infusions:    PRN Meds:dextrose 50%, dextrose 50%, glucagon (human recombinant), glucose, glucose, HYDROmorphone, insulin aspart U-100, ondansetron, promethazine, simethicone, sodium chloride 0.9%    Review of patient's allergies indicates:   Allergen Reactions    Clindamycin Diarrhea    Flagyl [metronidazole hcl]     Metronidazole        Objectives:     Vitals(Most Recent)      BP  Min: 73/40  Max: 117/67  Temp  Av.9 °F (36.6 °C)  Min: 96.3 °F (35.7 °C)  Max: 98.5 °F (36.9 °C)  Pulse  Av.4  Min: 80  Max: 95  Resp  Av.8  Min: 16  Max: 18  SpO2  Av.1 %  Min: 97 %  Max: 99 %  Weight  Av kg (224 lb 13.9 oz)  Min: 102 kg (224 lb 13.9 oz)  Max: 102 kg (224 lb 13.9  oz)             Vitals(Amah30b)  Temp:  [96.3 °F (35.7 °C)-98.5 °F (36.9 °C)]   Pulse:  [80-95]   Resp:  [16-18]   BP: ()/(40-67)   SpO2:  [97 %-99 %]     I & O([id number])    Intake/Output Summary (Last 24 hours) at 3/7/2019 1301  Last data filed at 3/6/2019 2009  Gross per 24 hour   Intake 720 ml   Output --   Net 720 ml       Physical Exam:   General: AAOx3. NAD.  HEENT: NCAT. PERRLA. EOMI.     CV: RRR.No M/R/G.   Chest: NL effort. CTAB. No R/R/W.   Abd: +BS x 4. Distended and Tender to mild palpation. No rebound or guarding.   Ext: moving well. +distal pulses  Skin: Intact. No rash. No lesions.   Neuro: CN II-XII intact. No focal deficit.  Psych:  No A/V hallucinations. NL affect. No abnormal behaviors noted    LABS  CBC  Recent Labs   Lab 03/05/19 0313 03/06/19 0400 03/07/19 0419   WBC 9.16 10.62 10.20   RBC 2.83* 2.93* 2.79*   HGB 8.6* 8.7* 8.4*   HCT 27.3* 28.5* 26.8*   * 464* 453*   MCV 97 97 96   MCH 30.4 29.7 30.1   MCHC 31.5* 30.5* 31.3*       CMP  Recent Labs   Lab 03/05/19 0313 03/06/19 0400 03/07/19 0419   * 131*  131* 131*  130*   K 3.2* 3.8  3.8 4.2  4.2   CO2 25 22*  23 16*  18*   CL 92* 92*  91* 94*  94*   BUN 37* 39*  38* 38*  38*   CREATININE 8.2* 8.7*  8.7* 8.3*  8.3*   * 193*  193* 169*  170*     Recent Labs   Lab 03/05/19 0313 03/06/19 0400 03/07/19 0419   CALCIUM 8.2* 7.8*  7.8* 7.8*  7.7*   MG 1.4* 1.7 2.0   PHOS 4.9* 4.4  4.5 4.5  4.6*     Recent Labs   Lab 03/05/19  0313 03/06/19  0400 03/07/19  0419   PROT 6.5 6.4 6.5   ALBUMIN 1.8* 1.7*  1.8* 1.7*  1.7*   BILITOT 0.3 0.3 0.3   AST 12 12 18   ALKPHOS 117 114 120   ALT <5* <5* <5*       LAST HbA1c  Lab Results   Component Value Date    HGBA1C 6.3 (H) 09/12/2018     Micro:  Microbiology Results (last 7 days)     Procedure Component Value Units Date/Time    Culture, Fluid  (Aerobic) [[id number]]  (Susceptibility) Collected:  03/01/19 1439    Order Status:  Completed Specimen:  Body Fluid  from Ascites Updated:  03/07/19 1001     AEROBIC CULTURE - FLUID --     ENTEROBACTER CLOACAE  From broth only       AEROBIC CULTURE - FLUID --     STAPHYLOCOCCUS AUREUS  From broth only       Gram Stain Result Rare WBC's      No organisms seen    Blood culture [[id number]] Collected:  03/03/19 1625    Order Status:  Completed Specimen:  Blood Updated:  03/06/19 2012     Blood Culture, Routine No Growth to date     Blood Culture, Routine No Growth to date     Blood Culture, Routine No Growth to date     Blood Culture, Routine No Growth to date    Culture, Anaerobe [[id number]] Collected:  03/01/19 1542    Order Status:  Completed Specimen:  Body Fluid from Abdomen Updated:  03/06/19 1328     Anaerobic Culture Culture in progress    Fungus culture [[id number]] Collected:  03/01/19 1542    Order Status:  Completed Specimen:  Body Fluid from Abdomen Updated:  03/06/19 0857     Fungus (Mycology) Culture Culture in progress    AFB Culture & Smear [[id number]] Collected:  03/01/19 1542    Order Status:  Completed Specimen:  Body Fluid from Abdomen Updated:  03/04/19 1604     AFB Culture & Smear Culture in progress     AFB CULTURE STAIN No acid fast bacilli seen.    C Diff Toxin by PCR [[id number]] Collected:  03/01/19 0855    Order Status:  Completed Updated:  03/02/19 0556     C. diff PCR Negative    Clostridium difficile EIA [[id number]]  (Abnormal) Collected:  03/01/19 0855    Order Status:  Completed Specimen:  Stool Updated:  03/01/19 2057     C. diff Antigen Positive     C difficile Toxins A+B, EIA Negative     Comment: Testing not recommended for children <24 months old.             Assessment/Plan:   47 yo female with history of HTN, DM, PAD, AoCD 2/2 CKD, on PD since 2004 presenting for peritonitis     Peritonitis 2/2 Peritoneal Dialysis  PD initiated on 4/20/04  Hx of 5-6 episodes of Peritonitis in the past  Per Nephro, Cultures taken PTA grew gram positive cocci at [facility]  Received 1 dose of Vanc and Fortaz  On  admission, patient was afebrile with no leukocytosis  Blood cultures: NGTD  Consulted Nephrology, appreciate recs  Discontinued Fortaz and cefazolin in peritoneal bag,  per Nephro on 3/5  Leukocytosis on 3/3/19.  Resolved   Day 1 on 3/5/19 started on oxacillin via PD and bactrim via IV, managed by nephro as well.     ESRD  Continuing PD per nephro  Hypokalemia, hypomagnesium, hyperphosphotemia managed by nephro w/ PD    Hypertension  BP goal <140/80  Blood pressure stable and at baseline for patient      Anemia of Chronic Disease secondary to CKD  H/H at baseline  BUN/Cr at baseline  Will continue to monitor     DM  A1C (9/12/18): 6.3  AM glucose: 193  Insulin Detemir 200U in peritoneal bag per Nephro  Continue to monitor with POCT glucose QID  Currently on SSI     C Diff  Antigen Positive, toxin neg  Will f/u confirmatory testing    PT/OT: ordered and following  Code: full  Diet: Renal, Diabetic diet  Ppx: dvt: Aspirin, Plavix, SCDs, leander    Dispo: continue to follow nephro recs and antibiotics completion for peritonitis.PD fluid sensitivity to bactrim. New abx regimen started on 3/5. PD fluid wbc trending down. Improving..      Loyd Beltre MD  [facility]  03/07/2019

## 2019-03-07 NOTE — PLAN OF CARE
03/07/19 1503   Type of Frequent Check   Type Patient Rounds;Other (see comments)  (VN Rounds, 2nd attempt-resting in bed)   Safety/Activity   Patient Rounds bed in low position  (Not facing VN to visualize)   Positioning   Body Position side-lying, left

## 2019-03-07 NOTE — PLAN OF CARE
Problem: Adult Inpatient Plan of Care  Goal: Plan of Care Review  Outcome: Ongoing (interventions implemented as appropriate)  Plan of care is ongoing for this patient. Pt is alert and oriented. Verbalizes understanding to call for assistance out of bed as needed.. Room is near nurse's station for frequent observation. Pain is monitored every 1 to 2 hours with nursing rounds. Will continue to monitor

## 2019-03-07 NOTE — PROGRESS NOTES
[facility]  Nephrology  Progress Note    Patient Name: Jenni Todd  MRN: [id number]  Admission Date: 2/21/2019  Hospital Length of Stay: 11 days  Attending Provider: Dominick Bueno MD   Primary Care Physician: Michael Tan Iii, MD  Principal Problem:Peritonitis associated with peritoneal dialysis  Date of service 3/7/2019  Consults     Reason for consult: PD, peritonitis  Subjective:     Interval History: She has persistent abdominal pain, fullness although improving, improved since reduced fill volumes.  Having loose stools again.  No n/v    Review of patient's allergies indicates:   Allergen Reactions    Clindamycin Diarrhea    Flagyl [metronidazole hcl]     Metronidazole      Current Facility-Administered Medications   Medication Frequency    aspirin EC tablet 81 mg Daily    atorvastatin tablet 40 mg QHS    calcitRIOL capsule 0.5 mcg QAM    cinacalcet tablet 90 mg QHS    clopidogrel tablet 75 mg Daily    dextrose 50% injection 12.5 g PRN    dextrose 50% injection 25 g PRN    epoetin adonay injection 20,000 Units Every Mon, Wed, Fri    glucagon (human recombinant) injection 1 mg PRN    glucose chewable tablet 16 g PRN    glucose chewable tablet 24 g PRN    hydromorphone (PF) injection 1 mg Q6H PRN    insulin aspart U-100 pen 0-5 Units QID (AC + HS) PRN    Lactobacillus acidoph-L.bulgar 1 million cell tablet 4 tablet TID WM    ondansetron injection 4 mg Q8H PRN    oxacillin 2,000 mg in Soln Dianeal PD fluid 2,000 mL Once    oxacillin 2,000 mg in Soln Dianeal PD fluid 2,000 mL Once    potassium chloride SA CR tablet 20 mEq Daily    promethazine tablet 12.5 mg Q6H PRN    ramelteon tablet 8 mg QHS    sevelamer carbonate tablet 800 mg TID WM    simethicone chewable tablet 125 mg Q6H PRN    sodium bicarbonate tablet 1,300 mg BID    sodium chloride 0.9% flush 5 mL PRN    sulfamethoxazole-trimethoprim 400-80 mg/5 mL (BACTRIM) 500 mg in dextrose 5 % 500 mL IVPB Q24H     vitamin renal formula (B-complex-vitamin c-folic acid) 1 mg per capsule 1 capsule Daily       Objective:     Vital Signs (Most Recent):  Temp: 97.4 °F (36.3 °C) (03/07/19 1133)  Pulse: 92 (03/07/19 1133)  Resp: 16 (03/07/19 1133)  BP: (!) 92/57 (03/07/19 1133)  SpO2: 98 % (03/07/19 1133)  O2 Device (Oxygen Therapy): room air (03/07/19 1133) Vital Signs (24h Range):  Temp:  [96.3 °F (35.7 °C)-98.5 °F (36.9 °C)] 97.4 °F (36.3 °C)  Pulse:  [80-95] 92  Resp:  [16-18] 16  SpO2:  [97 %-99 %] 98 %  BP: ()/(40-67) 92/57     Weight: 102 kg (224 lb 13.9 oz) (03/07/19 0629)  Body mass index is 34.7 kg/m².  Body surface area is 2.2 meters squared.    I/O last 3 completed shifts:  In: 1170 [P.O.:570; I.V.:100; IV Piggyback:500]  Out: 1 [Stool:1]    Physical Exam   Constitutional: She is oriented to person, place, and time. She appears well-developed and well-nourished. No distress.   HENT:   Head: Normocephalic and atraumatic.   Eyes: EOM are normal. No scleral icterus.   Cardiovascular: Normal rate and regular rhythm.   Pulmonary/Chest: Effort normal and breath sounds normal. No respiratory distress.   Abdominal: Soft. She exhibits distension. There is tenderness (diffusely tender, no rebound/guarding).   PD catheter   Musculoskeletal: She exhibits no edema or deformity.   Neurological: She is alert and oriented to person, place, and time.   Skin: Skin is warm and dry. No rash noted. She is not diaphoretic.   Psychiatric: She has a normal mood and affect. Her behavior is normal.   Nursing note and vitals reviewed.      Significant Labs:  CBC:   Recent Labs   Lab 03/07/19  0419   WBC 10.20   RBC 2.79*   HGB 8.4*   HCT 26.8*   *   MCV 96   MCH 30.1   MCHC 31.3*     CMP:   Recent Labs   Lab 03/07/19  0419   *  170*   CALCIUM 7.8*  7.7*   ALBUMIN 1.7*  1.7*   PROT 6.5   *  130*   K 4.2  4.2   CO2 16*  18*   CL 94*  94*   BUN 38*  38*   CREATININE 8.3*  8.3*   ALKPHOS 120   ALT <5*   AST 18    BILITOT 0.3     Microbiology Results (last 7 days)     Procedure Component Value Units Date/Time    Culture, Fluid  (Aerobic) [[id number]]  (Susceptibility) Collected:  03/01/19 1439    Order Status:  Completed Specimen:  Body Fluid from Ascites Updated:  03/07/19 1001     AEROBIC CULTURE - FLUID --     ENTEROBACTER CLOACAE  From broth only       AEROBIC CULTURE - FLUID --     STAPHYLOCOCCUS AUREUS  From broth only       Gram Stain Result Rare WBC's      No organisms seen    Blood culture [[id number]] Collected:  03/03/19 1625    Order Status:  Completed Specimen:  Blood Updated:  03/06/19 2012     Blood Culture, Routine No Growth to date     Blood Culture, Routine No Growth to date     Blood Culture, Routine No Growth to date     Blood Culture, Routine No Growth to date    Culture, Anaerobe [[id number]] Collected:  03/01/19 1542    Order Status:  Completed Specimen:  Body Fluid from Abdomen Updated:  03/06/19 1328     Anaerobic Culture Culture in progress    Fungus culture [[id number]] Collected:  03/01/19 1542    Order Status:  Completed Specimen:  Body Fluid from Abdomen Updated:  03/06/19 0857     Fungus (Mycology) Culture Culture in progress    AFB Culture & Smear [[id number]] Collected:  03/01/19 1542    Order Status:  Completed Specimen:  Body Fluid from Abdomen Updated:  03/04/19 1604     AFB Culture & Smear Culture in progress     AFB CULTURE STAIN No acid fast bacilli seen.    C Diff Toxin by PCR [[id number]] Collected:  03/01/19 0855    Order Status:  Completed Updated:  03/02/19 0556     C. diff PCR Negative    Clostridium difficile EIA [[id number]]  (Abnormal) Collected:  03/01/19 0855    Order Status:  Completed Specimen:  Stool Updated:  03/01/19 2057     C. diff Antigen Positive     C difficile Toxins A+B, EIA Negative     Comment: Testing not recommended for children <24 months old.           No results for input(s): COLORU, CLARITYU, SPECGRAV, PHUR, PROTEINUA, GLUCOSEU, BILIRUBINCON, BLOODU, WBCU, RBCU,  BACTERIA, MUCUS, NITRITE, LEUKOCYTESUR, UROBILINOGEN, HYALINECASTS in the last 168 hours.  All labs within the past 24 hours have been reviewed.        Assessment/Plan:     Active Diagnoses:    Diagnosis Date Noted POA    PRINCIPAL PROBLEM:  Peritonitis associated with peritoneal dialysis [T85.71XA] 02/21/2019 Unknown    Hypotension due to hypovolemia [I95.89, E86.1]  Unknown    Occult GI bleeding [R19.5] 03/01/2019 No    Abdominal pain [R10.9]  Yes    Peritonitis due to infected peritoneal dialysis catheter [T85.71XA, K65.9] 02/21/2019 Yes    Chronic low back pain [M54.5, G89.29] 12/23/2015 Yes    Hypoalbuminemia [E88.09] 07/14/2015 Yes    Anemia in chronic kidney disease, on chronic dialysis [N18.6, D63.1, Z99.2]  Not Applicable    Hypertension [I10]  Yes    Hyperlipidemia [E78.5]  Yes    CAD (coronary artery disease) with recent PCI 12/18/2012 [I25.10]  Yes    Diabetes mellitus, type 2 [E11.9]  Yes    ESRD (end stage renal disease) on PD ( initiated dialysis 04/20/2004) [N18.6] 04/20/2004 Yes      Problems Resolved During this Admission:         1. ESRD - PD  - continue nightly PD, continue lower fill volume to 2L from 2.8L 2/2 abdominal discomfort, with increased number of dwells while reduced volume to improve clearance  - renally dose medications for dialysis  - convert dialysate from 2.5+2.5% dianeal to 1.5+2.5% dianeal 2/2 hypotension requiring IVF overnight  Peritonitis - monitor cell counts, continue IP oxacillin, IV Vanc and Bactrim, monitor vanc levels QD.  IV Vanc re-dosed this AM  Hyponatremia - continue uf with PD as tolerated, monitor BMP daily, convert IVPB to NS when possible  Hypokalemia - improved, Replace PRN, check level in AM  Hypomagnesemia - improved, replace IV PRN, check level in AM  Acidosis - worsening, increase PO bicarb, check BMP in AM  2. HTN - on no antihypertensives with some hypotension overnight, will reduce dianeal concentration to reduce UF, UF as tolerated on  dialysis  3. Anemia of chronic kidney disease - Hgb stable, continue to monitor CBC  4. MBD/secondary hyperparathyroidism - continue sevelamer, phos improving, continue to monitor phos  5. Access - PD catherer  6. Nutrition - recommend renal diet restrictions, nepro    Thank you for allowing me to participate in the care of your patient.  Please call with any questions.    Date of service: 3/7/2019  Sita Bennett MD   Nephrology  [facility]  Office [phone]   [facility]

## 2019-03-07 NOTE — PLAN OF CARE
Problem: Adult Inpatient Plan of Care  Goal: Plan of Care Review  Outcome: Ongoing (interventions implemented as appropriate)   03/07/19 5486   Plan of Care Review   Plan of Care Reviewed With patient   Progress improving   Patient awake, alert and oriented. VSS. Blood glucose monitored ac/hs and covered per sliding scale.Pain controlled with medication. IV antibiotics for infection. Patient on PD. Bed alarm remains on, call light in reach.

## 2019-03-07 NOTE — PLAN OF CARE
Did assessment on patient. Went over plan of care. Bed in low position, call light in reach.Saftey measure taken. Answered questions.

## 2019-03-08 NOTE — PLAN OF CARE
VN cued into room for introduction. Informed patient that VN would be working alongside bedside nurse and PCT throughout shift.  Education provided and documented under education tab.  Patient educated on safety measures. Call bell in reach and instructed on how to call for assistance.  Patient verbalized all understanding.  Allowed time for questions.

## 2019-03-08 NOTE — PLAN OF CARE
Plan of care reviewed with patient, understanding verbalized.  Pt refuses tele. Bed alarm on, call light in reach, fall precautions in place. Report given to oncoming nurse.

## 2019-03-08 NOTE — PROGRESS NOTES
[facility]  Nephrology  Progress Note    Patient Name: Jenni Todd  MRN: [id number]  Admission Date: 2/21/2019  Hospital Length of Stay: 12 days  Attending Provider: Dominick Bueno MD   Primary Care Physician: Michael Tan Iii, MD  Principal Problem:Peritonitis associated with peritoneal dialysis  Date of service 3/8/2019  Consults  Reason for consult: PD, peritonitis  Subjective:     Interval History: She c/o abdominal tenderness more during the day and improved when she is on PD at night.  No n/v    Review of patient's allergies indicates:   Allergen Reactions    Clindamycin Diarrhea    Flagyl [metronidazole hcl]     Metronidazole      Current Facility-Administered Medications   Medication Frequency    aspirin EC tablet 81 mg Daily    atorvastatin tablet 40 mg QHS    calcitRIOL capsule 0.5 mcg QAM    cinacalcet tablet 90 mg QHS    clopidogrel tablet 75 mg Daily    dextrose 50% injection 12.5 g PRN    dextrose 50% injection 25 g PRN    epoetin adonay injection 20,000 Units Every Mon, Wed, Fri    glucagon (human recombinant) injection 1 mg PRN    glucose chewable tablet 16 g PRN    glucose chewable tablet 24 g PRN    hydromorphone (PF) injection 1 mg Q6H PRN    insulin aspart U-100 pen 0-5 Units QID (AC + HS) PRN    Lactobacillus acidoph-L.bulgar 1 million cell tablet 4 tablet TID WM    ondansetron injection 4 mg Q8H PRN    potassium chloride SA CR tablet 20 mEq Daily    promethazine tablet 12.5 mg Q6H PRN    ramelteon tablet 8 mg QHS    sevelamer carbonate tablet 800 mg TID WM    simethicone chewable tablet 125 mg Q6H PRN    sodium bicarbonate tablet 1,300 mg BID    sodium chloride 0.9% flush 5 mL PRN    sulfamethoxazole-trimethoprim 400-80 mg/5 mL (BACTRIM) 500 mg in dextrose 5 % 500 mL IVPB Q24H    vitamin renal formula (B-complex-vitamin c-folic acid) 1 mg per capsule 1 capsule Daily       Objective:     Vital Signs (Most Recent):  Temp: 97.7 °F (36.5 °C)  (03/08/19 1620)  Pulse: 82 (03/08/19 1620)  Resp: 16 (03/08/19 1620)  BP: 131/60 (03/08/19 1620)  SpO2: 98 % (03/08/19 1124)  O2 Device (Oxygen Therapy): room air (03/08/19 1128) Vital Signs (24h Range):  Temp:  [96.6 °F (35.9 °C)-98.1 °F (36.7 °C)] 97.7 °F (36.5 °C)  Pulse:  [80-86] 82  Resp:  [14-18] 16  SpO2:  [95 %-98 %] 98 %  BP: ()/(52-63) 131/60     Weight: 102.7 kg (226 lb 6.6 oz) (03/08/19 0722)  Body mass index is 34.94 kg/m².  Body surface area is 2.21 meters squared.    I/O last 3 completed shifts:  In: 820 [P.O.:820]  Out: -     Physical Exam   Constitutional: She is oriented to person, place, and time. She appears well-developed and well-nourished. No distress.   HENT:   Head: Normocephalic and atraumatic.   Eyes: EOM are normal. No scleral icterus.   Cardiovascular: Normal rate and regular rhythm.   Pulmonary/Chest: Effort normal and breath sounds normal. No respiratory distress.   Abdominal: Soft. She exhibits distension. There is tenderness (diffusely tender, no rebound/guarding).   PD catheter   Musculoskeletal: She exhibits no edema or deformity.   Neurological: She is alert and oriented to person, place, and time.   Skin: Skin is warm and dry. No rash noted. She is not diaphoretic.   Psychiatric: She has a normal mood and affect. Her behavior is normal.   Nursing note and vitals reviewed.      Significant Labs:  CBC:   Recent Labs   Lab 03/08/19  0434   WBC 8.49   RBC 2.83*   HGB 8.5*   HCT 27.9*   *   MCV 99*   MCH 30.0   MCHC 30.5*     CMP:   Recent Labs   Lab 03/08/19 0434   *  194*   CALCIUM 7.6*  7.6*   ALBUMIN 1.8*  1.8*   PROT 6.3   *  131*   K 3.7  3.7   CO2 26  26   CL 90*  90*   BUN 37*  36*   CREATININE 8.3*  8.3*   ALKPHOS 129   ALT <5*   AST 13   BILITOT 0.3     Microbiology Results (last 7 days)     Procedure Component Value Units Date/Time    Culture, Anaerobe [[id number]]     Order Status:  No result Specimen:  Body Fluid from Peritoneal Fluid      Aerobic culture [[id number]]     Order Status:  No result Specimen:  Body Fluid from Peritoneal Fluid     Culture, Anaerobe [[id number]] Collected:  03/01/19 1542    Order Status:  Completed Specimen:  Body Fluid from Abdomen Updated:  03/08/19 0819     Anaerobic Culture No anaerobes isolated    Blood culture [[id number]] Collected:  03/03/19 1625    Order Status:  Completed Specimen:  Blood Updated:  03/07/19 2012     Blood Culture, Routine No Growth to date     Blood Culture, Routine No Growth to date     Blood Culture, Routine No Growth to date     Blood Culture, Routine No Growth to date     Blood Culture, Routine No Growth to date    Culture, Fluid  (Aerobic) [[id number]]  (Susceptibility) Collected:  03/01/19 1439    Order Status:  Completed Specimen:  Body Fluid from Ascites Updated:  03/07/19 1001     AEROBIC CULTURE - FLUID --     ENTEROBACTER CLOACAE  From broth only       AEROBIC CULTURE - FLUID --     STAPHYLOCOCCUS AUREUS  From broth only       Gram Stain Result Rare WBC's      No organisms seen    Fungus culture [[id number]] Collected:  03/01/19 1542    Order Status:  Completed Specimen:  Body Fluid from Abdomen Updated:  03/06/19 0857     Fungus (Mycology) Culture Culture in progress    AFB Culture & Smear [[id number]] Collected:  03/01/19 1542    Order Status:  Completed Specimen:  Body Fluid from Abdomen Updated:  03/04/19 1604     AFB Culture & Smear Culture in progress     AFB CULTURE STAIN No acid fast bacilli seen.    C Diff Toxin by PCR [[id number]] Collected:  03/01/19 0855    Order Status:  Completed Updated:  03/02/19 0556     C. diff PCR Negative    Clostridium difficile EIA [[id number]]  (Abnormal) Collected:  03/01/19 0855    Order Status:  Completed Specimen:  Stool Updated:  03/01/19 2057     C. diff Antigen Positive     C difficile Toxins A+B, EIA Negative     Comment: Testing not recommended for children <24 months old.           No results for input(s): COLORU, CLARITYU, SPECGRAV, PHUR,  PROTEINUA, GLUCOSEU, BILIRUBINCON, BLOODU, WBCU, RBCU, BACTERIA, MUCUS, NITRITE, LEUKOCYTESUR, UROBILINOGEN, HYALINECASTS in the last 168 hours.  All labs within the past 24 hours have been reviewed.        Assessment/Plan:     Active Diagnoses:    Diagnosis Date Noted POA    PRINCIPAL PROBLEM:  Peritonitis associated with peritoneal dialysis [T85.71XA] 02/21/2019 Unknown    Hypotension due to hypovolemia [I95.89, E86.1]  Unknown    Occult GI bleeding [R19.5] 03/01/2019 No    Abdominal pain [R10.9]  Yes    Peritonitis due to infected peritoneal dialysis catheter [T85.71XA, K65.9] 02/21/2019 Yes    Chronic low back pain [M54.5, G89.29] 12/23/2015 Yes    Hypoalbuminemia [E88.09] 07/14/2015 Yes    Anemia in chronic kidney disease, on chronic dialysis [N18.6, D63.1, Z99.2]  Not Applicable    Hypertension [I10]  Yes    Hyperlipidemia [E78.5]  Yes    CAD (coronary artery disease) with recent PCI 12/18/2012 [I25.10]  Yes    Diabetes mellitus, type 2 [E11.9]  Yes    ESRD (end stage renal disease) on PD ( initiated dialysis 04/20/2004) [N18.6] 04/20/2004 Yes      Problems Resolved During this Admission:         1. ESRD - PD with Dr Watt [facility]  - continue nightly PD, continue lower fill volume to 2L from 2.8L 2/2 abdominal discomfort, with increased number of dwells while reduced volume to improve clearance  - renally dose medications for dialysis  - continue dialysate converted from 2.5+2.5% dianeal to 1.5+2.5% dianeal 2/2 hypotension requiring IVF overnight  Peritonitis - monitor cell counts - repeat studies today, continue IP oxacillin, IV Vanc and Bactrim, monitor vanc levels QD.  IV Vanc re-dosed 3/7/19, will hold dose today.  Cultures showing sensitivity to Vanc and Bactrim but do not have sensitivities from initial Cx yet  Hyponatremia - continue uf with PD as tolerated, monitor BMP daily, convert IVPB to NS when possible  Hypokalemia - improved, continue scheduled  replacement, Replace PRN, check level in AM  Hypomagnesemia - improved, replace IV PRN, check level in AM  Acidosis - improved, continue increased PO bicarb, check BMP in AM  2. HTN - on no antihypertensives with some hypotension overnight, continue reduced dianeal concentration to reduce UF, UF as tolerated on dialysis,  BP improving  3. Anemia of chronic kidney disease - Hgb stable, continue to monitor CBC  4. MBD/secondary hyperparathyroidism - continue sevelamer, phos improving, continue to monitor phos  5. Access - PD catherer  6. Nutrition - recommend renal diet restrictions, nepro    Thank you for allowing me to participate in the care of your patient.  Please call with any questions.    Date of service: 3/8/2019  Sita Bennett MD   Nephrology  [facility]  Office [phone]   [facility]

## 2019-03-08 NOTE — PROGRESS NOTES
.Pharmacy New Medication Education    Patient accepted medication education.    Pharmacy educated patient on name and purpose of medications and possible side effects, using the teach-back method.     Current Inpatient Medication Orders   aspirin EC tablet 81 mg   atorvastatin tablet 40 mg   calcitRIOL capsule 0.5 mcg   cinacalcet tablet 90 mg   clopidogrel tablet 75 mg   dextrose 50% injection 12.5 g   dextrose 50% injection 25 g   epoetin adonay injection 20,000 Units   glucagon (human recombinant) injection 1 mg   glucose chewable tablet 16 g   glucose chewable tablet 24 g   hydromorphone (PF) injection 1 mg   insulin aspart U-100 pen 0-5 Units   Lactobacillus acidoph-L.bulgar 1 million cell tablet 4 tablet   ondansetron injection 4 mg   oxacillin 2,000 mg in Soln Dianeal PD fluid 2,000 mL   potassium chloride SA CR tablet 20 mEq   promethazine tablet 12.5 mg   ramelteon tablet 8 mg   sevelamer carbonate tablet 800 mg   simethicone chewable tablet 125 mg   sodium bicarbonate tablet 1,300 mg   sodium chloride 0.9% flush 5 mL   sulfamethoxazole-trimethoprim 400-80 mg/5 mL (BACTRIM) 500 mg in dextrose 5 % 500 mL IVPB   vitamin renal formula (B-complex-vitamin c-folic acid) 1 mg per capsule 1 capsule       Learners of pharmacy medication education included:  Patient    Patient +/- learner response:  Verbalized Understanding, Teachback

## 2019-03-08 NOTE — PROGRESS NOTES
[facility] Medicine  Progress Note    Follow up for: Vomiting      Hospital Stay Day 12      Subjective:  Pt said she continues to do better. She has loose BM x2 overnight. She said those loose BM is more at night. Recent c diff toxin was neg. She denies f/c, n/v/c, cp/sob, urinary complaints, weakness, numbness. She does endorse some abdomen discomfort similar to before, but improving.        Scheduled Meds:   aspirin  81 mg Oral Daily    atorvastatin  40 mg Oral QHS    calcitRIOL  0.5 mcg Oral QAM    cinacalcet  90 mg Oral QHS    clopidogrel  75 mg Oral Daily    epoetin adonay (PROCRIT) injection  20,000 Units Subcutaneous Every Mon, Wed, Fri    Lactobacillus acidoph-L.bulgar  4 tablet Oral TID WM    Dianeal PD with additives   Intraperitoneal Once    potassium chloride  20 mEq Oral Daily    ramelteon  8 mg Oral QHS    sevelamer carbonate  800 mg Oral TID WM    sodium bicarbonate  1,300 mg Oral BID    trimethoprim-sulfamethoxasole (BACTRIM) IVPB (fixed ratio)  500 mg Intravenous Q24H    vitamin renal formula (B-complex-vitamin c-folic acid)  1 capsule Oral Daily     Continuous Infusions:    PRN Meds:dextrose 50%, dextrose 50%, glucagon (human recombinant), glucose, glucose, HYDROmorphone, insulin aspart U-100, ondansetron, promethazine, simethicone, sodium chloride 0.9%    Review of patient's allergies indicates:   Allergen Reactions    Clindamycin Diarrhea    Flagyl [metronidazole hcl]     Metronidazole        Objectives:     Vitals(Most Recent)      BP  Min: 87/61  Max: 124/60  Temp  Av.3 °F (36.3 °C)  Min: 96.6 °F (35.9 °C)  Max: 98.1 °F (36.7 °C)  Pulse  Av.2  Min: 80  Max: 92  Resp  Avg: 15.7  Min: 14  Max: 18  SpO2  Av.2 %  Min: 95 %  Max: 98 %  Weight  Av.6 kg (226 lb 4.8 oz)  Min: 102.6 kg (226 lb 3.1 oz)  Max: 102.7 kg (226 lb 6.6 oz)             Vitals(Salm64l)  Temp:  [96.6 °F (35.9 °C)-98.1 °F (36.7 °C)]   Pulse:  [80-92]   Resp:  [14-18]   BP:  ()/(52-63)   SpO2:  [95 %-98 %]     I & O([id number])    Intake/Output Summary (Last 24 hours) at 3/8/2019 0946  Last data filed at 3/8/2019 0504  Gross per 24 hour   Intake 460 ml   Output --   Net 460 ml       Physical Exam:   General: AAOx3. NAD.  HEENT: NCAT. PERRLA. EOMI.     CV: RRR.No M/R/G.   Chest: NL effort. CTAB. No R/R/W.   Abd: +BS x 4. Distended and Tender to mild palpation. No rebound or guarding.   Ext: moving well. +distal pulses  Skin: Intact. No rash. No lesions.   Neuro: CN II-XII intact. No focal deficit.  Psych:  No A/V hallucinations. NL affect. No abnormal behaviors noted    LABS  CBC  Recent Labs   Lab 03/06/19 0400 03/07/19 0419 03/08/19  0434   WBC 10.62 10.20 8.49   RBC 2.93* 2.79* 2.83*   HGB 8.7* 8.4* 8.5*   HCT 28.5* 26.8* 27.9*   * 453* 399*   MCV 97 96 99*   MCH 29.7 30.1 30.0   MCHC 30.5* 31.3* 30.5*       CMP  Recent Labs   Lab 03/06/19 0400 03/07/19 0419 03/08/19  0434   *  131* 131*  130* 131*  131*   K 3.8  3.8 4.2  4.2 3.7  3.7   CO2 22*  23 16*  18* 26  26   CL 92*  91* 94*  94* 90*  90*   BUN 39*  38* 38*  38* 37*  36*   CREATININE 8.7*  8.7* 8.3*  8.3* 8.3*  8.3*   *  193* 169*  170* 197*  194*     Recent Labs   Lab 03/06/19  0400 03/07/19 0419 03/08/19  0434   CALCIUM 7.8*  7.8* 7.8*  7.7* 7.6*  7.6*   MG 1.7 2.0 1.7   PHOS 4.4  4.5 4.5  4.6* 4.7*  4.7*     Recent Labs   Lab 03/06/19  0400 03/07/19  0419 03/08/19  0434   PROT 6.4 6.5 6.3   ALBUMIN 1.7*  1.8* 1.7*  1.7* 1.8*  1.8*   BILITOT 0.3 0.3 0.3   AST 12 18 13   ALKPHOS 114 120 129   ALT <5* <5* <5*       LAST HbA1c  Lab Results   Component Value Date    HGBA1C 6.3 (H) 09/12/2018     Micro:  Microbiology Results (last 7 days)     Procedure Component Value Units Date/Time    Culture, Anaerobe [[id number]] Collected:  03/01/19 1542    Order Status:  Completed Specimen:  Body Fluid from Abdomen Updated:  03/08/19 0819     Anaerobic Culture No anaerobes isolated     Blood culture [[id number]] Collected:  03/03/19 1625    Order Status:  Completed Specimen:  Blood Updated:  03/07/19 2012     Blood Culture, Routine No Growth to date     Blood Culture, Routine No Growth to date     Blood Culture, Routine No Growth to date     Blood Culture, Routine No Growth to date     Blood Culture, Routine No Growth to date    Culture, Fluid  (Aerobic) [[id number]]  (Susceptibility) Collected:  03/01/19 1439    Order Status:  Completed Specimen:  Body Fluid from Ascites Updated:  03/07/19 1001     AEROBIC CULTURE - FLUID --     ENTEROBACTER CLOACAE  From broth only       AEROBIC CULTURE - FLUID --     STAPHYLOCOCCUS AUREUS  From broth only       Gram Stain Result Rare WBC's      No organisms seen    Fungus culture [[id number]] Collected:  03/01/19 1542    Order Status:  Completed Specimen:  Body Fluid from Abdomen Updated:  03/06/19 0857     Fungus (Mycology) Culture Culture in progress    AFB Culture & Smear [[id number]] Collected:  03/01/19 1542    Order Status:  Completed Specimen:  Body Fluid from Abdomen Updated:  03/04/19 1604     AFB Culture & Smear Culture in progress     AFB CULTURE STAIN No acid fast bacilli seen.    C Diff Toxin by PCR [[id number]] Collected:  03/01/19 0855    Order Status:  Completed Updated:  03/02/19 0556     C. diff PCR Negative    Clostridium difficile EIA [[id number]]  (Abnormal) Collected:  03/01/19 0855    Order Status:  Completed Specimen:  Stool Updated:  03/01/19 2057     C. diff Antigen Positive     C difficile Toxins A+B, EIA Negative     Comment: Testing not recommended for children <24 months old.             Assessment/Plan:   47 yo female with history of HTN, DM, PAD, AoCD 2/2 CKD, on PD since 2004 presenting for peritonitis     Peritonitis 2/2 Peritoneal Dialysis  PD initiated on 4/20/04  Hx of 5-6 episodes of Peritonitis in the past  Per Nephro, Cultures [facility]  Received 1 dose of Vanc and Fortaz  On admission,  patient was afebrile with no leukocytosis  Blood cultures: NGTD  Consulted Nephrology, appreciate recs  Discontinued Fortaz and cefazolin in peritoneal bag,  per Nephro on 3/5  Leukocytosis on 3/3/19.  Resolved   Day 1 on 3/5/19 started on oxacillin via PD and bactrim via IV, managed by nephro as well.  PD fluid aerobic cx sensitive to bactrime:  AEROBIC CULTURE - FLUID --     ENTEROBACTER CLOACAE   From broth only     AEROBIC CULTURE - FLUID --    STAPHYLOCOCCUS AUREUS   From broth only     Gram Stain Result Rare WBC's     No organisms seen          ESRD  Continuing PD per nephro  Hypokalemia, hypomagnesium, hyperphosphotemia managed by nephro w/ PD    Hypertension  BP goal <140/80  Blood pressure stable and at baseline for patient      Anemia of Chronic Disease secondary to CKD  H/H at baseline  BUN/Cr at baseline  Will continue to monitor     DM  A1C (9/12/18): 6.3  AM glucose: 193  Insulin Detemir 200U in peritoneal bag per Nephro  Continue to monitor with POCT glucose QID  Currently on SSI     C Diff  Antigen Positive, toxin neg  Will f/u confirmatory testing    PT/OT: ordered and following  Code: full  Diet: Renal, Diabetic diet  Ppx: dvt: Aspirin, Plavix, SCDs, leander    Dispo: continue to follow nephro recs and antibiotics completion for peritonitis.PD fluid sensitivity to bactrim. New abx regimen started on 3/5. PD fluid wbc trending down. Improving still.      Loyd Beltre MD  [facility]  03/08/2019

## 2019-03-09 NOTE — SUBJECTIVE & OBJECTIVE
Interval History:   Peritoneal WBC down to 507 yesterday. Remains on abx. No events overnight. Doing okay this morning. Abdominal pain still present but improved compared to arrival. BP improving. Asked to continue current UF regimen. Reports vomiting that happens once overnight for the last few days. Reports acidic taste. Offered PPI but patient would like to reduce pill burden.     Review of patient's allergies indicates:   Allergen Reactions    Clindamycin Diarrhea    Flagyl [metronidazole hcl]     Metronidazole      Current Facility-Administered Medications   Medication Frequency    aspirin EC tablet 81 mg Daily    atorvastatin tablet 40 mg QHS    calcitRIOL capsule 0.5 mcg QAM    cinacalcet tablet 90 mg QHS    clopidogrel tablet 75 mg Daily    dextrose 50% injection 12.5 g PRN    dextrose 50% injection 25 g PRN    epoetin adonay injection 20,000 Units Every Mon, Wed, Fri    glucagon (human recombinant) injection 1 mg PRN    glucose chewable tablet 16 g PRN    glucose chewable tablet 24 g PRN    hydromorphone (PF) injection 1 mg Q6H PRN    insulin aspart U-100 pen 0-5 Units QID (AC + HS) PRN    Lactobacillus acidoph-L.bulgar 1 million cell tablet 4 tablet TID WM    ondansetron injection 4 mg Q8H PRN    potassium chloride SA CR tablet 20 mEq Daily    promethazine tablet 12.5 mg Q6H PRN    ramelteon tablet 8 mg QHS    sevelamer carbonate tablet 800 mg TID WM    simethicone chewable tablet 125 mg Q6H PRN    sodium bicarbonate tablet 1,300 mg BID    sodium chloride 0.9% flush 5 mL PRN    sulfamethoxazole-trimethoprim 400-80 mg/5 mL (BACTRIM) 500 mg in dextrose 5 % 500 mL IVPB Q24H    vitamin renal formula (B-complex-vitamin c-folic acid) 1 mg per capsule 1 capsule Daily       Objective:     Vital Signs (Most Recent):  Temp: 98 °F (36.7 °C) (03/09/19 1127)  Pulse: 84 (03/09/19 1127)  Resp: 18 (03/09/19 1127)  BP: (!) 106/52 (03/09/19 1127)  SpO2: 97 % (03/09/19 0822)  O2 Device (Oxygen  Therapy): room air (03/09/19 0822) Vital Signs (24h Range):  Temp:  [97 °F (36.1 °C)-98.1 °F (36.7 °C)] 98 °F (36.7 °C)  Pulse:  [81-87] 84  Resp:  [16-18] 18  SpO2:  [95 %-98 %] 97 %  BP: (100-149)/(52-65) 106/52     Weight: 104.9 kg (231 lb 4.2 oz) (03/09/19 0533)  Body mass index is 35.69 kg/m².  Body surface area is 2.24 meters squared.    I/O last 3 completed shifts:  In: 240 [P.O.:240]  Out: -     Physical Exam   Constitutional: She is oriented to person, place, and time. She appears well-developed and well-nourished. No distress.   HENT:   Head: Normocephalic and atraumatic.   Mouth/Throat: Oropharynx is clear and moist and mucous membranes are normal.   Eyes: Conjunctivae and EOM are normal.   Cardiovascular: Normal rate and regular rhythm.   Pulmonary/Chest: Effort normal and breath sounds normal.   Abdominal: Soft. There is no guarding.   Musculoskeletal: She exhibits no edema or deformity.   Neurological: She is alert and oriented to person, place, and time.   Skin: Skin is warm and dry. She is not diaphoretic.   Psychiatric: She has a normal mood and affect. Her behavior is normal.       Significant Labs:  CBC:   Recent Labs   Lab 03/09/19 0416   WBC 8.71   RBC 3.02*   HGB 9.0*   HCT 29.2*   *   MCV 97   MCH 29.8   MCHC 30.8*     CMP:   Recent Labs   Lab 03/09/19  0416   *   CALCIUM 7.7*   ALBUMIN 1.9*   PROT 6.7   *   K 3.7   CO2 22*   CL 90*   BUN 31*   CREATININE 8.2*   ALKPHOS 133   ALT <5*   AST 22   BILITOT 0.2     All labs within the past 24 hours have been reviewed.     Significant Imaging:  Labs: Reviewed

## 2019-03-09 NOTE — PLAN OF CARE
Problem: Adult Inpatient Plan of Care  Goal: Plan of Care Review  Outcome: Ongoing (interventions implemented as appropriate)  Plan of care reviewed with patient, understanding verbalized.  Patient vomited once last night, IV zofran given. PRN dilaudid given for pain.  Bed alarm on, call light in reach, fall precautions in place.

## 2019-03-09 NOTE — NURSING
[facility] set up per Rx using aseptic technique. Pt states she will connect self and initiate tx @ HS. Dressing changed to PD catheter exit site, CDI. No s/s of infection noted.

## 2019-03-09 NOTE — PLAN OF CARE
During VN rounding patient denies any questions or concerns at this time.  Patient denies any pain or discomfort. Patient is sitting up on the side of the bed eating.   I will continue to monitor the patient and intervene as needed.       03/09/19 1602   Type of Frequent Check   Type Patient Rounds  (VN rounding)   Safety/Activity   Patient Rounds visualized patient;ID band on;call light in patient/parent reach   Safety Promotion/Fall Prevention side rails raised x 2   Safety Precautions emergency equipment at bedside   Positioning   Body Position sitting up in bed   Pain/Comfort/Sleep   Preferred Pain Scale number (Numeric Rating Pain Scale)   Comfort/Acceptable Pain Level 5   Pain Rating (0-10): Rest 0   Pain Rating (0-10): Activity 0       Cardiac/Telemetry Details / Alarms   Cardiac/Telemetry Monitor On No  (per the floor nurse patient is refusing)   Cardiac/Telemetry Audible No   Cardiac/Telemetry Alarms Set No   Assessments (Pre/Post)   Level of Consciousness (AVPU) alert

## 2019-03-09 NOTE — PROGRESS NOTES
[facility] Medicine  Progress Note    Hospital Stay Day 13    Subjective/Interval change: She endorses emesis overnight. Overall notes improvement in her clinic status.     Scheduled Meds:   aspirin  81 mg Oral Daily    atorvastatin  40 mg Oral QHS    calcitRIOL  0.5 mcg Oral QAM    cinacalcet  90 mg Oral QHS    clopidogrel  75 mg Oral Daily    epoetin adonay (PROCRIT) injection  20,000 Units Subcutaneous Every Mon, Wed, Fri    Lactobacillus acidoph-L.bulgar  4 tablet Oral TID WM    potassium chloride  20 mEq Oral Daily    ramelteon  8 mg Oral QHS    sevelamer carbonate  800 mg Oral TID WM    sodium bicarbonate  1,300 mg Oral BID    trimethoprim-sulfamethoxasole (BACTRIM) IVPB (fixed ratio)  500 mg Intravenous Q24H    vitamin renal formula (B-complex-vitamin c-folic acid)  1 capsule Oral Daily     Continuous Infusions:    PRN Meds:dextrose 50%, dextrose 50%, glucagon (human recombinant), glucose, glucose, HYDROmorphone, insulin aspart U-100, ondansetron, promethazine, simethicone, sodium chloride 0.9%    Review of patient's allergies indicates:   Allergen Reactions    Clindamycin Diarrhea    Flagyl [metronidazole hcl]     Metronidazole        Objectives:     Vitals(Most Recent)      BP  Min: 100/56  Max: 149/65  Temp  Av.4 °F (36.3 °C)  Min: 96.9 °F (36.1 °C)  Max: 98.1 °F (36.7 °C)  Pulse  Av.4  Min: 81  Max: 86  Resp  Av.3  Min: 16  Max: 18  SpO2  Av.3 %  Min: 95 %  Max: 98 %  Weight  Av.9 kg (231 lb 4.2 oz)  Min: 104.9 kg (231 lb 4.2 oz)  Max: 104.9 kg (231 lb 4.2 oz)             Vitals(Zzck82n)  Temp:  [96.9 °F (36.1 °C)-98.1 °F (36.7 °C)]   Pulse:  [81-86]   Resp:  [16-18]   BP: (100-149)/(56-65)   SpO2:  [95 %-98 %]     I & O(Aakr84q)  No intake or output data in the 24 hours ending 19 0739    Physical Exam:   General: AAOx3. NAD.  HEENT: NCAT. PERRLA. EOMI.     CV: RRR.No M/R/G.   Chest: NL effort. CTAB. No R/R/W.   Abd: +BS x 4. Distended and Tender  to mild palpation. No rebound or guarding.   Ext: moving well. +distal pulses  Skin: Intact. No rash. No lesions.   Neuro: CN II-XII intact. No focal deficit.  Psych:  No A/V hallucinations. NL affect. No abnormal behaviors noted    LABS  CBC  Recent Labs   Lab 03/07/19 0419 03/08/19 0434 03/09/19 0416   WBC 10.20 8.49 8.71   RBC 2.79* 2.83* 3.02*   HGB 8.4* 8.5* 9.0*   HCT 26.8* 27.9* 29.2*   * 399* 403*   MCV 96 99* 97   MCH 30.1 30.0 29.8   MCHC 31.3* 30.5* 30.8*       CMP  Recent Labs   Lab 03/06/19 0400 03/07/19 0419 03/08/19 0434   *  131* 131*  130* 131*  131*   K 3.8  3.8 4.2  4.2 3.7  3.7   CO2 22*  23 16*  18* 26  26   CL 92*  91* 94*  94* 90*  90*   BUN 39*  38* 38*  38* 37*  36*   CREATININE 8.7*  8.7* 8.3*  8.3* 8.3*  8.3*   *  193* 169*  170* 197*  194*     Recent Labs   Lab 03/06/19 0400 03/07/19 0419 03/08/19 0434   CALCIUM 7.8*  7.8* 7.8*  7.7* 7.6*  7.6*   MG 1.7 2.0 1.7   PHOS 4.4  4.5 4.5  4.6* 4.7*  4.7*     Recent Labs   Lab 03/06/19 0400 03/07/19 0419 03/08/19  0434   PROT 6.4 6.5 6.3   ALBUMIN 1.7*  1.8* 1.7*  1.7* 1.8*  1.8*   BILITOT 0.3 0.3 0.3   AST 12 18 13   ALKPHOS 114 120 129   ALT <5* <5* <5*       LAST HbA1c  Lab Results   Component Value Date    HGBA1C 6.3 (H) 09/12/2018     Micro:  Microbiology Results (last 7 days)     Procedure Component Value Units Date/Time    Blood culture [[id number]] Collected:  03/03/19 1625    Order Status:  Completed Specimen:  Blood Updated:  03/08/19 2012     Blood Culture, Routine No growth after 5 days.    Aerobic culture [[id number]] Collected:  03/08/19 1729    Order Status:  Sent Specimen:  Body Fluid from Peritoneal Fluid Updated:  03/08/19 1938    Culture, Anaerobe [[id number]] Collected:  03/08/19 1729    Order Status:  Sent Specimen:  Body Fluid from Peritoneal Fluid Updated:  03/08/19 1938    Culture, Anaerobe [[id number]] Collected:  03/01/19 1542    Order Status:  Completed Specimen:   Body Fluid from Abdomen Updated:  03/08/19 0819     Anaerobic Culture No anaerobes isolated    Culture, Fluid  (Aerobic) [[id number]]  (Susceptibility) Collected:  03/01/19 1439    Order Status:  Completed Specimen:  Body Fluid from Ascites Updated:  03/07/19 1001     AEROBIC CULTURE - FLUID --     ENTEROBACTER CLOACAE  From broth only       AEROBIC CULTURE - FLUID --     STAPHYLOCOCCUS AUREUS  From broth only       Gram Stain Result Rare WBC's      No organisms seen    Fungus culture [[id number]] Collected:  03/01/19 1542    Order Status:  Completed Specimen:  Body Fluid from Abdomen Updated:  03/06/19 0857     Fungus (Mycology) Culture Culture in progress    AFB Culture & Smear [[id number]] Collected:  03/01/19 1542    Order Status:  Completed Specimen:  Body Fluid from Abdomen Updated:  03/04/19 1604     AFB Culture & Smear Culture in progress     AFB CULTURE STAIN No acid fast bacilli seen.          Assessment/Plan:   49 yo female with history of HTN, DM, PAD, AoCD 2/2 CKD, on PD since 2004 presented with peritonitis.    Peritonitis 2/2 Peritoneal Dialysis  PD initiated on 4/20/04  Per Nephro, Peritoneal WBC down to 507 yesterday. Remains on abx  continue IP oxacillin, IV vanc, and IV bactrim vanc level remains supratherapeutic so will continue to hold IV vanc today; last dosed on 3/7  PD fluid aerobic cx sensitive to bactrim  AEROBIC CULTURE - FLUID --     ENTEROBACTER CLOACAE   From broth only     AEROBIC CULTURE - FLUID --    STAPHYLOCOCCUS AUREUS   From broth only     Gram Stain Result Rare WBC's     No organisms seen          ESRD  Continue per PD per nephro      Hypertension  BP goal <140/80  Blood pressure stable and at baseline for patient      Anemia of Chronic Disease secondary to CKD  H/H at baseline  BUN/Cr at baseline  Will continue to monitor     DM  A1C (9/12/18): 6.3  AM glucose: 193  Insulin Detemir 200U in peritoneal bag per Nephro  Continue to monitor with POCT glucose QID  Currently on  SSI     Code: full  Diet: Renal, Diabetic diet  Ppx: dvt: Aspirin, Plavix, SCDs, leander    Dispo:   Pending peritonitis resolution    Case discussed with Dr. Bharat Miranda Jr., D.O.  [facility], [facility]   [phone]

## 2019-03-09 NOTE — PROGRESS NOTES
[facility]  Nephrology  Progress Note    Patient Name: Jenni Todd  MRN: [id number]  Admission Date: 2/21/2019  Hospital Length of Stay: 13 days  Attending Provider: Dominick Bueno MD   Primary Care Physician: Michael Tan Iii, MD  Principal Problem:Peritonitis associated with peritoneal dialysis    Subjective:     HPI: Ms. Todd is a 49 yo AAF that we are following for ESRD management and peritonitis.     Interval History:   Peritoneal WBC down to 507 yesterday. Remains on abx. No events overnight. Doing okay this morning. Abdominal pain still present but improved compared to arrival. BP improving. Asked to continue current UF regimen. Reports vomiting that happens once overnight for the last few days. Reports acidic taste. Offered PPI but patient would like to reduce pill burden.     Review of patient's allergies indicates:   Allergen Reactions    Clindamycin Diarrhea    Flagyl [metronidazole hcl]     Metronidazole      Current Facility-Administered Medications   Medication Frequency    aspirin EC tablet 81 mg Daily    atorvastatin tablet 40 mg QHS    calcitRIOL capsule 0.5 mcg QAM    cinacalcet tablet 90 mg QHS    clopidogrel tablet 75 mg Daily    dextrose 50% injection 12.5 g PRN    dextrose 50% injection 25 g PRN    epoetin adonay injection 20,000 Units Every Mon, Wed, Fri    glucagon (human recombinant) injection 1 mg PRN    glucose chewable tablet 16 g PRN    glucose chewable tablet 24 g PRN    hydromorphone (PF) injection 1 mg Q6H PRN    insulin aspart U-100 pen 0-5 Units QID (AC + HS) PRN    Lactobacillus acidoph-L.bulgar 1 million cell tablet 4 tablet TID WM    ondansetron injection 4 mg Q8H PRN    potassium chloride SA CR tablet 20 mEq Daily    promethazine tablet 12.5 mg Q6H PRN    ramelteon tablet 8 mg QHS    sevelamer carbonate tablet 800 mg TID WM    simethicone chewable tablet 125 mg Q6H PRN    sodium bicarbonate tablet 1,300 mg BID    sodium chloride  0.9% flush 5 mL PRN    sulfamethoxazole-trimethoprim 400-80 mg/5 mL (BACTRIM) 500 mg in dextrose 5 % 500 mL IVPB Q24H    vitamin renal formula (B-complex-vitamin c-folic acid) 1 mg per capsule 1 capsule Daily       Objective:     Vital Signs (Most Recent):  Temp: 98 °F (36.7 °C) (03/09/19 1127)  Pulse: 84 (03/09/19 1127)  Resp: 18 (03/09/19 1127)  BP: (!) 106/52 (03/09/19 1127)  SpO2: 97 % (03/09/19 0822)  O2 Device (Oxygen Therapy): room air (03/09/19 0822) Vital Signs (24h Range):  Temp:  [97 °F (36.1 °C)-98.1 °F (36.7 °C)] 98 °F (36.7 °C)  Pulse:  [81-87] 84  Resp:  [16-18] 18  SpO2:  [95 %-98 %] 97 %  BP: (100-149)/(52-65) 106/52     Weight: 104.9 kg (231 lb 4.2 oz) (03/09/19 0533)  Body mass index is 35.69 kg/m².  Body surface area is 2.24 meters squared.    I/O last 3 completed shifts:  In: 240 [P.O.:240]  Out: -     Physical Exam   Constitutional: She is oriented to person, place, and time. She appears well-developed and well-nourished. No distress.   HENT:   Head: Normocephalic and atraumatic.   Mouth/Throat: Oropharynx is clear and moist and mucous membranes are normal.   Eyes: Conjunctivae and EOM are normal.   Cardiovascular: Normal rate and regular rhythm.   Pulmonary/Chest: Effort normal and breath sounds normal.   Abdominal: Soft. There is no guarding.   Musculoskeletal: She exhibits no edema or deformity.   Neurological: She is alert and oriented to person, place, and time.   Skin: Skin is warm and dry. She is not diaphoretic.   Psychiatric: She has a normal mood and affect. Her behavior is normal.       Significant Labs:  CBC:   Recent Labs   Lab 03/09/19  0416   WBC 8.71   RBC 3.02*   HGB 9.0*   HCT 29.2*   *   MCV 97   MCH 29.8   MCHC 30.8*     CMP:   Recent Labs   Lab 03/09/19  0416   *   CALCIUM 7.7*   ALBUMIN 1.9*   PROT 6.7   *   K 3.7   CO2 22*   CL 90*   BUN 31*   CREATININE 8.2*   ALKPHOS 133   ALT <5*   AST 22   BILITOT 0.2     All labs within the past 24 hours have  been reviewed.     Significant Imaging:  Labs: Reviewed    Assessment/Plan:     ESRD on PD  [facility]  - continue nightly PD. Continue lower fill volume of 2L from 2.8L 2/2 abdominal discomfort, with increased number of dwells while reduced volume to improve clearance.   - renally dose medications for dialysis  - daily labs    Hypotension  - 2/2 peritonitis  - continue decreased UF regimen: dialysate converted from 2.5+2.5% dianeal to 1.5+2.5% dianeal for now  - BP improving; patient requests to not make any changes today  - will continue to monitor    Peritonitis  - WBC count improving; will repeat studies today  - continue IP oxacillin, IV vanc, and IV bactrim  - vanc level remains supratherapeutic so will continue to hold IV vanc today; last dosed on 3/7    Hyponatremia  - likely related to hypervolemia  - will increase UF on PD as BP improves    Hypokalemia  - continue daily K replacement    Metabolic acidosis  - continue bicarb supplementation    Anemia of CKD  - currently on BUSHRA TIW  - monitor daily    Secondary HPTH  - continue calcitriol and cinacalcet. Monitor Ca levels.   - phos at goal on sevelamer  - daily labs    Hypoalbuminemia  - Boost TID    Thank you for your consult. I will follow-up with patient. Please contact us if you have any additional questions.    Roselyn Warren MD  Nephrology  [facility], [facility]  Office: [phone]  [facility] Cross covering for:  [facility]  ERLIN Watt MD, FACHATTIE,   VONDA Vallecillo MD,   MD WIL Dee, DIONE  [address]  [place], [address]  [phone]  After hours answering service: [phone]    Date of service: 03/09/2019

## 2019-03-09 NOTE — PLAN OF CARE
VN note: VN cued into pt's room for introduction. VN informed pt that VN would be working closely along side bedside nurse, PCT, and the rest of care team and making rounds throughout the shift. Pt verbalized understanding. Allowed time for questions. Patient denies any pain or discomfort at this time.   Patient educated on safety to call before getting up, because we don't want the patient to fall and harm themselves in any way.  VN will continue to be available to patient and intervene prn.        03/09/19 0932   Type of Frequent Check   Type Patient Rounds  (VN rounding)   Safety/Activity   Patient Rounds visualized patient;ID band on;call light in patient/parent reach   Safety Promotion/Fall Prevention side rails raised x 2   Safety Precautions emergency equipment at bedside   Positioning   Body Position side-lying, right   Head of Bed (HOB) HOB at 15 degrees   Pain/Comfort/Sleep   Preferred Pain Scale number (Numeric Rating Pain Scale)   Comfort/Acceptable Pain Level 5   Pain Rating (0-10): Rest 0   Pain Rating (0-10): Activity 0       Cardiac/Telemetry Details / Alarms   Cardiac/Telemetry Monitor On No  (per the floor nurse patient is refusing)   Cardiac/Telemetry Audible No   Cardiac/Telemetry Alarms Set No   Assessments (Pre/Post)   Level of Consciousness (AVPU) alert

## 2019-03-10 NOTE — PROGRESS NOTES
PGY-1 Progress Note  [employer] [facility]  Follow up for:   Chief Complaint   Patient presents with    Vomiting     48 year old female presents to ed cc of abdominal pain with diarrhea that began on  and emesis today. patient is pd patient last pd exchange was yesterday full treamtent took antibiotic pd treatment today.        Hospital Stay Day 14      Subjective: AF VSS, good uop, +bm/+flatus, tolerating diet without n/v, ambulating. Pt reports she has some pain in her right side around the peritoneal dialysis site.   Denies any CP, SOB, N/V/D, headaches, fever or chills.       Scheduled Meds:   aspirin  81 mg Oral Daily    atorvastatin  40 mg Oral QHS    calcitRIOL  0.5 mcg Oral QAM    cinacalcet  90 mg Oral QHS    clopidogrel  75 mg Oral Daily    epoetin adonay (PROCRIT) injection  20,000 Units Subcutaneous Every Mon, Wed, Fri    Lactobacillus acidoph-L.bulgar  4 tablet Oral TID WM    potassium chloride  20 mEq Oral Daily    ramelteon  8 mg Oral QHS    sevelamer carbonate  800 mg Oral TID WM    sodium bicarbonate  1,300 mg Oral BID    trimethoprim-sulfamethoxasole (BACTRIM) IVPB (fixed ratio)  500 mg Intravenous Q24H    vitamin renal formula (B-complex-vitamin c-folic acid)  1 capsule Oral Daily     Continuous Infusions:  PRN Meds:aluminum-magnesium hydroxide-simethicone, dextrose 50%, dextrose 50%, glucagon (human recombinant), glucose, glucose, HYDROmorphone, insulin aspart U-100, ondansetron, promethazine, simethicone, sodium chloride 0.9%    Review of patient's allergies indicates:   Allergen Reactions    Clindamycin Diarrhea    Flagyl [metronidazole hcl]     Metronidazole        Objectives:     Vitals(Most Recent)      BP  Min: 100/58  Max: 143/63  Temp  Av °F (36.7 °C)  Min: 97.5 °F (36.4 °C)  Max: 98.7 °F (37.1 °C)  Pulse  Av  Min: 84  Max: 88  Resp  Av.8  Min: 16  Max: 18  SpO2  Av.1 %  Min: 95 %  Max: 99 %  Weight  Av.4 kg (227 lb 15.3 oz)  Min: 103.4 kg (227 lb 15.3 oz)   Max: 103.4 kg (227 lb 15.3 oz)             Vitals(Kftz63c)  Temp:  [97.5 °F (36.4 °C)-98.7 °F (37.1 °C)]   Pulse:  [84-88]   Resp:  [16-18]   BP: (100-143)/(52-72)   SpO2:  [95 %-99 %]     I & O(Slao40l)    Intake/Output Summary (Last 24 hours) at 3/10/2019 0626  Last data filed at 3/9/2019 1543  Gross per 24 hour   Intake 1000 ml   Output 427 ml   Net 573 ml       Physical Exam:   General: AAOx3. NAD.  HEENT: NCAT. PERRLA. EOMI.     CV: RRR.No M/R/G.   Chest: NL effort. CTAB. No R/R/W.   Abd: +BS x 4. Soft. Tender right quadrants. No rebound or guarding.   Ext: moving well. +distal pulses  Skin: Intact. No rash. No lesions.   Neuro: CN II-XII intact. No focal deficit.  Psych:  No A/V hallucinations. NL affect. No abnormal behaviors noted    LABS  CBC  Recent Labs   Lab 03/08/19  0434 03/09/19  0416 03/10/19  0425   WBC 8.49 8.71 8.15   RBC 2.83* 3.02* 2.79*   HGB 8.5* 9.0* 8.3*   HCT 27.9* 29.2* 27.2*   * 403* 351*   MCV 99* 97 98   MCH 30.0 29.8 29.7   MCHC 30.5* 30.8* 30.5*       CMP  Recent Labs   Lab 03/08/19  0434 03/09/19  0416 03/10/19  0425   *  131* 130* 128*   K 3.7  3.7 3.7 3.7   CO2 26  26 22* 25   CL 90*  90* 90* 90*   BUN 37*  36* 31* 33*   CREATININE 8.3*  8.3* 8.2* 8.1*   *  194* 157* 157*     Recent Labs   Lab 03/08/19  0434 03/09/19  0416 03/10/19  0425   CALCIUM 7.6*  7.6* 7.7* 7.3*   MG 1.7 1.6 1.5*   PHOS 4.7*  4.7* 4.6* 5.2*     Recent Labs   Lab 03/08/19  0434 03/09/19  0416 03/10/19  0425   PROT 6.3 6.7 6.1   ALBUMIN 1.8*  1.8* 1.9* 1.7*   BILITOT 0.3 0.2 0.3   AST 13 22 12   ALKPHOS 129 133 130   ALT <5* <5* <5*       LAST HbA1c  Lab Results   Component Value Date    HGBA1C 6.3 (H) 09/12/2018       Last TSH  @resultfast(tsh)@      Micro:  Microbiology Results (last 7 days)     Procedure Component Value Units Date/Time    Aerobic culture [[id number]] Collected:  03/08/19 1729    Order Status:  Completed Specimen:  Body Fluid from Peritoneal Fluid Updated:   03/10/19 0813     Aerobic Bacterial Culture No growth    Blood culture [[id number]] Collected:  03/03/19 1625    Order Status:  Completed Specimen:  Blood Updated:  03/08/19 2012     Blood Culture, Routine No growth after 5 days.    Culture, Anaerobe [[id number]] Collected:  03/08/19 1729    Order Status:  Sent Specimen:  Body Fluid from Peritoneal Fluid Updated:  03/08/19 1938    Culture, Anaerobe [[id number]] Collected:  03/01/19 1542    Order Status:  Completed Specimen:  Body Fluid from Abdomen Updated:  03/08/19 0819     Anaerobic Culture No anaerobes isolated    Culture, Fluid  (Aerobic) [[id number]]  (Susceptibility) Collected:  03/01/19 1439    Order Status:  Completed Specimen:  Body Fluid from Ascites Updated:  03/07/19 1001     AEROBIC CULTURE - FLUID --     ENTEROBACTER CLOACAE  From broth only       AEROBIC CULTURE - FLUID --     STAPHYLOCOCCUS AUREUS  From broth only       Gram Stain Result Rare WBC's      No organisms seen    Fungus culture [[id number]] Collected:  03/01/19 1542    Order Status:  Completed Specimen:  Body Fluid from Abdomen Updated:  03/06/19 0857     Fungus (Mycology) Culture Culture in progress    AFB Culture & Smear [[id number]] Collected:  03/01/19 1542    Order Status:  Completed Specimen:  Body Fluid from Abdomen Updated:  03/04/19 1604     AFB Culture & Smear Culture in progress     AFB CULTURE STAIN No acid fast bacilli seen.            Assessment/Plan:     49 yo female with history of HTN, DM, PAD, AoCD 2/2 CKD, on PD since 2004 presented with peritonitis.     Peritonitis 2/2 Peritoneal Dialysis  PD initiated on 4/20/04  Per Nephro, Peritoneal WBC down to 507. Remains on abx  Continue IP oxacillin, IV vanc, and IV bactrim vanc level remains supratherapeutic so will continue to hold IV vanc today; last dosed on 3/7  PD fluid aerobic cx sensitive to bactrim  Agree with nephrology recommendations     ESRD  Continue per PD per nephro      Hypertension  BP goal <140/80  Blood pressure  stable and at baseline for patient      Anemia of Chronic Disease secondary to CKD  H/H at baseline  BUN/Cr at baseline  Will continue to monitor     DM  A1C (9/12/18): 6.3  AM glucose: 193  Insulin Detemir 200U in peritoneal bag per Nephro  Continue to monitor with POCT glucose QID  Currently on SSI     Code: full  Diet: Renal, Diabetic diet  Ppx: dvt: Aspirin, Plavix, SCDs, leander       Dispo: Pending improvement in peritonitis    Case d/w staff.     Jaylin Villalobos MD  [facility] PGY-1  03/10/2019

## 2019-03-10 NOTE — PLAN OF CARE
VN note: VN cued into pt's room for introduction. VN informed pt that VN would be working closely along side bedside nurse, PCT, and the rest of care team and making rounds throughout the shift. Pt verbalized understanding. Allowed time for questions. Patient denies any pain or discomfort at this time.   Patient educated on safety to call before getting up, because we don't want the patient to fall and harm themselves in any way.  Patient sleepy and would like to sleep now. VN will continue to be available to patient and intervene prn.        03/10/19 1009   Type of Frequent Check   Type Patient Rounds  (VN rounding)   Safety/Activity   Patient Rounds visualized patient;ID band on;call light in patient/parent reach   Safety Promotion/Fall Prevention side rails raised x 2   Safety Precautions emergency equipment at bedside   Positioning   Body Position side-lying, right   Pain/Comfort/Sleep   Preferred Pain Scale number (Numeric Rating Pain Scale)   Comfort/Acceptable Pain Level 5   Pain Rating (0-10): Rest 0   Pain Rating (0-10): Activity 0       Cardiac/Telemetry Details / Alarms   Cardiac/Telemetry Monitor On No   Cardiac/Telemetry Audible No   Cardiac/Telemetry Alarms Set No   Assessments (Pre/Post)   Level of Consciousness (AVPU) alert

## 2019-03-10 NOTE — PROGRESS NOTES
[facility]  Nephrology  Progress Note    Patient Name: Jenni Todd  MRN: [id number]  Admission Date: 2/21/2019  Hospital Length of Stay: 14 days  Attending Provider: Dominick Bueno MD   Primary Care Physician: Michael Tan Iii, MD  Principal Problem:Peritonitis associated with peritoneal dialysis    Subjective:     HPI: Ms. Todd is a 47 yo AAF that we are following for ESRD management and peritonitis.     Interval History:   Remains on IP oxacillin and IV bactrim per previous nephrologist last week. Appears uncomfortable this afternoon. Reports persistent nausea and vomiting. Unable to keep any food down. Also with diarrhea; though reports diarrhea is improving. Currently with zofran 4mg q8h PRN. Received PD overnight with UF ~400cc.     Review of patient's allergies indicates:   Allergen Reactions    Clindamycin Diarrhea    Flagyl [metronidazole hcl]     Metronidazole      Current Facility-Administered Medications   Medication Frequency    aluminum-magnesium hydroxide-simethicone 200-200-20 mg/5 mL suspension 30 mL Q4H PRN    aspirin EC tablet 81 mg Daily    atorvastatin tablet 40 mg QHS    calcitRIOL capsule 0.5 mcg QAM    cinacalcet tablet 90 mg QHS    clopidogrel tablet 75 mg Daily    dextrose 50% injection 12.5 g PRN    dextrose 50% injection 25 g PRN    epoetin adonay injection 20,000 Units Every Mon, Wed, Fri    glucagon (human recombinant) injection 1 mg PRN    glucose chewable tablet 16 g PRN    glucose chewable tablet 24 g PRN    hydromorphone (PF) injection 1 mg Q6H PRN    insulin aspart U-100 pen 0-5 Units QID (AC + HS) PRN    Lactobacillus acidoph-L.bulgar 1 million cell tablet 4 tablet TID WM    ondansetron injection 4 mg Q8H PRN    ondansetron injection 8 mg Q6H    potassium chloride SA CR tablet 20 mEq Daily    promethazine tablet 12.5 mg Q6H PRN    ramelteon tablet 8 mg QHS    sevelamer carbonate tablet 800 mg TID WM    simethicone chewable tablet  125 mg Q6H PRN    sodium bicarbonate tablet 1,300 mg BID    sodium chloride 0.9% flush 5 mL PRN    sulfamethoxazole-trimethoprim 400-80 mg/5 mL (BACTRIM) 500 mg in dextrose 5 % 500 mL IVPB Q24H    vitamin renal formula (B-complex-vitamin c-folic acid) 1 mg per capsule 1 capsule Daily       Objective:     Vital Signs (Most Recent):  Temp: 98.2 °F (36.8 °C) (03/10/19 1623)  Pulse: 80 (03/10/19 1623)  Resp: 19 (03/10/19 1623)  BP: (!) 123/59 (03/10/19 1623)  SpO2: 96 % (03/10/19 1619)  O2 Device (Oxygen Therapy): room air (03/10/19 1619) Vital Signs (24h Range):  Temp:  [97.5 °F (36.4 °C)-98.7 °F (37.1 °C)] 98.2 °F (36.8 °C)  Pulse:  [80-88] 80  Resp:  [16-20] 19  SpO2:  [95 %-99 %] 96 %  BP: (100-123)/(58-72) 123/59     Weight: 103.4 kg (227 lb 15.3 oz) (03/10/19 0541)  Body mass index is 35.18 kg/m².  Body surface area is 2.22 meters squared.    I/O last 3 completed shifts:  In: 1000 [P.O.:500; IV Piggyback:500]  Out: 427 [Other:427]    Physical Exam   Constitutional: She is oriented to person, place, and time. She appears well-developed and well-nourished. No distress.   HENT:   Head: Normocephalic and atraumatic.   Mouth/Throat: Oropharynx is clear and moist and mucous membranes are normal.   Eyes: Conjunctivae and EOM are normal.   Cardiovascular: Normal rate and regular rhythm.   Pulmonary/Chest: Effort normal and breath sounds normal.   Abdominal: Soft. There is no guarding.   Musculoskeletal: She exhibits edema (trace). She exhibits no deformity.   Neurological: She is alert and oriented to person, place, and time.   Skin: Skin is warm and dry. She is not diaphoretic.   Psychiatric: She has a normal mood and affect. Her behavior is normal.       Significant Labs:  CBC:   Recent Labs   Lab 03/10/19  0425   WBC 8.15   RBC 2.79*   HGB 8.3*   HCT 27.2*   *   MCV 98   MCH 29.7   MCHC 30.5*     CMP:   Recent Labs   Lab 03/10/19  0425   *   CALCIUM 7.3*   ALBUMIN 1.7*   PROT 6.1   *   K 3.7    CO2 25   CL 90*   BUN 33*   CREATININE 8.1*   ALKPHOS 130   ALT <5*   AST 12   BILITOT 0.3     All labs within the past 24 hours have been reviewed.     Significant Imaging:  Labs: Reviewed    Assessment/Plan:     ESRD on PD  - under the care of Dr. Watt/[facility]  - continue nightly PD. Continue lower fill volume of 2L from 2.8L 2/2 abdominal discomfort, with increased number of dwells while reduced volume to improve clearance.   - renally dose medications for dialysis  - daily labs     Hypotension  - 2/2 peritonitis  - continue decreased UF regimen: dialysate converted from 2.5+2.5% dianeal to 1.5+2.5% dianeal for now  - UF ~400cc yesterday; worsening hyponatremia and mild edema on exam. Will continue decreased UF regimen tonight; will increase back to 2.5/2.5 tomorrow.   - will continue to monitor     Peritonitis  - currently on IP oxacillin, IV vanc, and IV bactrim per Dr. Vallecillo  - unsure if patient was receiving abx prior to presentation; but today is at least antibiotic day #16; on current regimen for last 5 days  - periteonal WBC improving; < 100 yesterday; though 187 today. Likely normal lab variation.  - will discontinue IV vancomycin today as patient already on G- and G+ coverage with oxacillin and bactrim. Vanc level remains supratherapeutic.   - now with worsening nausea and vomiting. Suspecting this is related to bactrim. Ordered abdominal XR. Increasing antiemetics as above. Patient already receiving today's dose of bactrim. Would like to change to PO regimen but unsure of current treatment plan. Will defer to Dr. Vallecillo tomorrow  - nephrology team will need to call patient's dialysis unit tomorrow to determine final culture and sensitivity results, as well as tailor abx regimen to one that patient can follow at home  - recommended abx duration for mixed G- and G+ cultures is 21 days.    Vomiting  - likely drug-induced; suspecting Bactrim as causative agent. See above.   -  ordered XR to r/o other pathologies  - increased zofran to 8mg q6h scheduled (last EKG without QTc prolongation; will monitor)  - consider phenergan IM PRN    Hyponatremia  - likely related to hypervolemia  - will increase UF on PD starting tomorrow     Hypokalemia  - continue daily K replacement  - stable     Metabolic acidosis  - improved  - continue bicarb supplementation     Anemia of CKD  - currently on BUSHRA TIW  - monitor daily     Secondary HPTH  - continue calcitriol and cinacalcet. Monitor Ca levels.   - phos at goal on sevelamer  - daily labs     Hypoalbuminemia  - Boost TID     Case discussed with primary team.   Thank you for your consult. I will follow-up with patient. Please contact us if you have any additional questions.     Roselyn Warren MD  Nephrology  [facility], [facility]  Office: [phone]  [facility] covering for:  [facility]  ERLIN Watt MD, FACHATTIE,   VONDA Vallecillo MD,   MD WIL Dee, NP  [address]  [place], [address]  [phone]  After hours answering service: [phone]    Date of service: 03/10/2019

## 2019-03-10 NOTE — SUBJECTIVE & OBJECTIVE
Interval History:   Remains on IP oxacillin and IV bactrim per previous nephrologist last week. Appears uncomfortable this afternoon. Reports persistent nausea and vomiting. Unable to keep any food down. Also with diarrhea; though reports diarrhea is improving. Currently with zofran 4mg q8h PRN. Received PD overnight with UF ~400cc.     Review of patient's allergies indicates:   Allergen Reactions    Clindamycin Diarrhea    Flagyl [metronidazole hcl]     Metronidazole      Current Facility-Administered Medications   Medication Frequency    aluminum-magnesium hydroxide-simethicone 200-200-20 mg/5 mL suspension 30 mL Q4H PRN    aspirin EC tablet 81 mg Daily    atorvastatin tablet 40 mg QHS    calcitRIOL capsule 0.5 mcg QAM    cinacalcet tablet 90 mg QHS    clopidogrel tablet 75 mg Daily    dextrose 50% injection 12.5 g PRN    dextrose 50% injection 25 g PRN    epoetin adonay injection 20,000 Units Every Mon, Wed, Fri    glucagon (human recombinant) injection 1 mg PRN    glucose chewable tablet 16 g PRN    glucose chewable tablet 24 g PRN    hydromorphone (PF) injection 1 mg Q6H PRN    insulin aspart U-100 pen 0-5 Units QID (AC + HS) PRN    Lactobacillus acidoph-L.bulgar 1 million cell tablet 4 tablet TID WM    ondansetron injection 4 mg Q8H PRN    ondansetron injection 8 mg Q6H    potassium chloride SA CR tablet 20 mEq Daily    promethazine tablet 12.5 mg Q6H PRN    ramelteon tablet 8 mg QHS    sevelamer carbonate tablet 800 mg TID WM    simethicone chewable tablet 125 mg Q6H PRN    sodium bicarbonate tablet 1,300 mg BID    sodium chloride 0.9% flush 5 mL PRN    sulfamethoxazole-trimethoprim 400-80 mg/5 mL (BACTRIM) 500 mg in dextrose 5 % 500 mL IVPB Q24H    vitamin renal formula (B-complex-vitamin c-folic acid) 1 mg per capsule 1 capsule Daily       Objective:     Vital Signs (Most Recent):  Temp: 98.2 °F (36.8 °C) (03/10/19 1623)  Pulse: 80 (03/10/19 1623)  Resp: 19 (03/10/19 1623)  BP:  (!) 123/59 (03/10/19 1623)  SpO2: 96 % (03/10/19 1619)  O2 Device (Oxygen Therapy): room air (03/10/19 1619) Vital Signs (24h Range):  Temp:  [97.5 °F (36.4 °C)-98.7 °F (37.1 °C)] 98.2 °F (36.8 °C)  Pulse:  [80-88] 80  Resp:  [16-20] 19  SpO2:  [95 %-99 %] 96 %  BP: (100-123)/(58-72) 123/59     Weight: 103.4 kg (227 lb 15.3 oz) (03/10/19 0541)  Body mass index is 35.18 kg/m².  Body surface area is 2.22 meters squared.    I/O last 3 completed shifts:  In: 1000 [P.O.:500; IV Piggyback:500]  Out: 427 [Other:427]    Physical Exam   Constitutional: She is oriented to person, place, and time. She appears well-developed and well-nourished. No distress.   HENT:   Head: Normocephalic and atraumatic.   Mouth/Throat: Oropharynx is clear and moist and mucous membranes are normal.   Eyes: Conjunctivae and EOM are normal.   Cardiovascular: Normal rate and regular rhythm.   Pulmonary/Chest: Effort normal and breath sounds normal.   Abdominal: Soft. There is no guarding.   Musculoskeletal: She exhibits edema (trace). She exhibits no deformity.   Neurological: She is alert and oriented to person, place, and time.   Skin: Skin is warm and dry. She is not diaphoretic.   Psychiatric: She has a normal mood and affect. Her behavior is normal.       Significant Labs:  CBC:   Recent Labs   Lab 03/10/19  0425   WBC 8.15   RBC 2.79*   HGB 8.3*   HCT 27.2*   *   MCV 98   MCH 29.7   MCHC 30.5*     CMP:   Recent Labs   Lab 03/10/19  0425   *   CALCIUM 7.3*   ALBUMIN 1.7*   PROT 6.1   *   K 3.7   CO2 25   CL 90*   BUN 33*   CREATININE 8.1*   ALKPHOS 130   ALT <5*   AST 12   BILITOT 0.3     All labs within the past 24 hours have been reviewed.     Significant Imaging:  Labs: Reviewed

## 2019-03-10 NOTE — PLAN OF CARE
Problem: Adult Inpatient Plan of Care  Goal: Plan of Care Review  Outcome: Ongoing (interventions implemented as appropriate)  Plan of care reviewed with patient, understanding verbalized. Instructed to call for any assistance, understanding verbalized.PRN dilaudid given for pain.  PD overnight. Bed alarm on, call light in reach, fall precautions in place.  Will continue to monitor.

## 2019-03-11 NOTE — NURSING
CCPD set up per Rx using aseptic technique. Dressing to PD cath changed. PD fluid sample collected and sent to lab. Pt states she will connect self and initiate tx @ HS.

## 2019-03-11 NOTE — PROGRESS NOTES
PGY-1 Progress Note  [employer] [facility]  Follow up for:   Chief Complaint   Patient presents with    Vomiting     48 year old female presents to ed cc of abdominal pain with diarrhea that began on  and emesis today. patient is pd patient last pd exchange was yesterday full treamtent took antibiotic pd treatment today.      Hospital Stay Day 15    Subjective:   Patient seen and examined this am. She endorses nausea w/o emesis.  Denies any CP, SOB, N/V/D, headaches, fever or chills.     Scheduled Meds:   aspirin  81 mg Oral Daily    atorvastatin  40 mg Oral QHS    calcitRIOL  0.5 mcg Oral QAM    cinacalcet  90 mg Oral QHS    clopidogrel  75 mg Oral Daily    epoetin adonay (PROCRIT) injection  20,000 Units Subcutaneous Every Mon, Wed, Fri    Lactobacillus acidoph-L.bulgar  4 tablet Oral TID WM    ondansetron  8 mg Intravenous Q6H    potassium chloride  20 mEq Oral Daily    ramelteon  8 mg Oral QHS    sevelamer carbonate  800 mg Oral TID WM    sodium bicarbonate  1,300 mg Oral BID    trimethoprim-sulfamethoxasole (BACTRIM) IVPB (fixed ratio)  500 mg Intravenous Q24H    vitamin renal formula (B-complex-vitamin c-folic acid)  1 capsule Oral Daily     Continuous Infusions:  PRN Meds:aluminum-magnesium hydroxide-simethicone, dextrose 50%, dextrose 50%, glucagon (human recombinant), glucose, glucose, HYDROmorphone, insulin aspart U-100, ondansetron, promethazine, simethicone, sodium chloride 0.9%    Review of patient's allergies indicates:   Allergen Reactions    Clindamycin Diarrhea    Flagyl [metronidazole hcl]     Metronidazole        Objectives:     Vitals(Most Recent)      BP  Min: 91/56  Max: 123/59  Temp  Av.1 °F (36.2 °C)  Min: 96.4 °F (35.8 °C)  Max: 98.2 °F (36.8 °C)  Pulse  Av  Min: 80  Max: 92  Resp  Av.3  Min: 14  Max: 19  SpO2  Av %  Min: 96 %  Max: 96 %  Weight  Av.9 kg (220 lb 3.8 oz)  Min: 99.9 kg (220 lb 3.8 oz)  Max: 99.9 kg (220 lb 3.8 oz)              Vitals([id number])  Temp:  [96.4 °F (35.8 °C)-98.2 °F (36.8 °C)]   Pulse:  [80-92]   Resp:  [14-19]   BP: ()/(56-69)   SpO2:  [96 %]     I & O(Esso27l)    Intake/Output Summary (Last 24 hours) at 3/11/2019 0851  Last data filed at 3/10/2019 1600  Gross per 24 hour   Intake 910 ml   Output 347 ml   Net 563 ml     Physical Exam:   General: AAOx3. NAD.  HEENT: NCAT. PERRLA. EOMI.     CV: RRR. No M/R/G.   Chest: NL effort. CTAB. No R/R/W.   Abd: +BS x 4. Soft. Tender right quadrants. No rebound or guarding.   Ext: moving well. +distal pulses  Skin: Intact. No rash. No lesions.   Neuro: CN II-XII intact. No focal deficit.    LABS  CBC  Recent Labs   Lab 03/09/19  0416 03/10/19  0425 03/11/19  0652   WBC 8.71 8.15 8.82   RBC 3.02* 2.79* 2.74*   HGB 9.0* 8.3* 8.2*   HCT 29.2* 27.2* 26.5*   * 351* 399*   MCV 97 98 97   MCH 29.8 29.7 29.9   MCHC 30.8* 30.5* 30.9*       CMP  Recent Labs   Lab 03/09/19  0416 03/10/19  0425 03/11/19  0652   * 128* 130*  131*   K 3.7 3.7 3.7  3.8   CO2 22* 25 25  26   CL 90* 90* 91*  91*   BUN 31* 33* 32*  32*   CREATININE 8.2* 8.1* 8.0*  7.9*   * 157* 147*  147*     Recent Labs   Lab 03/09/19  0416 03/10/19  0425 03/11/19  0652   CALCIUM 7.7* 7.3* 7.4*  7.3*   MG 1.6 1.5* 1.6   PHOS 4.6* 5.2* 5.1*  5.5*     Recent Labs   Lab 03/09/19 0416 03/10/19  0425 03/11/19  0652   PROT 6.7 6.1 6.2   ALBUMIN 1.9* 1.7* 1.7*  1.7*   BILITOT 0.2 0.3 0.2   AST 22 12 13   ALKPHOS 133 130 124   ALT <5* <5* <5*       LAST HbA1c  Lab Results   Component Value Date    HGBA1C 6.3 (H) 09/12/2018     Micro:  Microbiology Results (last 7 days)     Procedure Component Value Units Date/Time    Culture, Anaerobe [[id number]] Collected:  03/08/19 1729    Order Status:  Completed Specimen:  Body Fluid from Peritoneal Fluid Updated:  03/11/19 0748     Anaerobic Culture Culture in progress    Aerobic culture [[id number]] Collected:  03/08/19 1729    Order Status:  Completed Specimen:   Body Fluid from Peritoneal Fluid Updated:  03/10/19 0813     Aerobic Bacterial Culture No growth    Blood culture [[id number]] Collected:  03/03/19 1625    Order Status:  Completed Specimen:  Blood Updated:  03/08/19 2012     Blood Culture, Routine No growth after 5 days.    Culture, Anaerobe [[id number]] Collected:  03/01/19 1542    Order Status:  Completed Specimen:  Body Fluid from Abdomen Updated:  03/08/19 0819     Anaerobic Culture No anaerobes isolated    Culture, Fluid  (Aerobic) [[id number]]  (Susceptibility) Collected:  03/01/19 1439    Order Status:  Completed Specimen:  Body Fluid from Ascites Updated:  03/07/19 1001     AEROBIC CULTURE - FLUID --     ENTEROBACTER CLOACAE  From broth only       AEROBIC CULTURE - FLUID --     STAPHYLOCOCCUS AUREUS  From broth only       Gram Stain Result Rare WBC's      No organisms seen    Fungus culture [[id number]] Collected:  03/01/19 1542    Order Status:  Completed Specimen:  Body Fluid from Abdomen Updated:  03/06/19 0857     Fungus (Mycology) Culture Culture in progress    AFB Culture & Smear [[id number]] Collected:  03/01/19 1542    Order Status:  Completed Specimen:  Body Fluid from Abdomen Updated:  03/04/19 1604     AFB Culture & Smear Culture in progress     AFB CULTURE STAIN No acid fast bacilli seen.        Assessment/Plan:   49 y/o F with h/o HTN, DM, PAD, AoCD 2/2 CKD, on PD since 2004 presented with peritonitis.     Peritonitis 2/2 Peritoneal Dialysis  PD initiated on 4/20/04  Remains on abx  Continue IP oxacillin, IV vanc, and IV bactrim vanc level remains supratherapeutic so will continue to hold IV vanc today; last dosed on 3/7  PD fluid aerobic cx sensitive to bactrim     ESRD  Continue per PD per nephro     Hypertension  BP goal <140/80  Blood pressure stable and at baseline for patient      Anemia of Chronic Disease secondary to CKD  H/H at baseline  BUN/Cr at baseline  Will continue to monitor     DM  A1C (9/12/18): 6.3  Continue to monitor with POCT  glucose QID  Currently on SSI     Code: full  Diet: Renal, Diabetic diet  Ppx: dvt: Aspirin, Plavix, SCDs, leander     Dispo: Pending improvement in peritonitis    Case d/w staff.     Jared Miranda Jr., D.O.  [facility], [facility]   [phone]

## 2019-03-11 NOTE — NURSING
VN rounding: VN cued into patient's room to complete evening rounding. Patient lying in bed restingl without difficulties. At this time patient denies any questions, concerns and needs. Will cont to be available to patient and intervene prn.

## 2019-03-11 NOTE — PROGRESS NOTES
Rounded on pt.  She states she may have to transition back to HD.  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  She asked about [facility] paperwork to be signed.  I educated that her doctor will have to fill out paperwork.  No other concerns at this time.

## 2019-03-11 NOTE — PROGRESS NOTES
"[facility]  Adult Nutrition  Progress Note    SUMMARY       Recommendations    Recommendation:   1. Continue to encourage intake of meals & Boost.     Goals:   Pt will consume at least 50-75% intake at meals  Nutrition Goal Status: (continues)  Communication of RD Recs: reviewed with RN(Anne)    Reason for Assessment  Reason For Assessment: RD follow-up  Diagnosis: (peritonitis)  Relevant Medical History: ESRD, HTN, HLD, obesity, CAD, neuropathy, DM, TIA, ORIF L hip  General Information Comments: Pt on Regular diet with Boost Plus. Noted pt with 0-25% intake at recent meals. Pt asleep at visit. Spoke with RN. NFPE not warranted-pt nourished.  Nutrition Discharge Planning: pt to d.c on Renal diet    Nutrition Risk Screen  Nutrition Risk Screen: no indicators present    Nutrition/Diet History  Food Preferences: no Quaker or cultural food prefs identified  Spiritual, Cultural Beliefs, Protestant Practices, Values that Affect Care: no  Factors Affecting Nutritional Intake: decreased appetite    Anthropometrics  Temp: 98.4 °F (36.9 °C)  Height Method: Stated  Height: 5' 7.5" (171.5 cm)  Height (inches): 67.5 in  Weight Method: Bed Scale  Weight: 99.9 kg (220 lb 3.8 oz)  Weight (lb): 220.24 lb  Ideal Body Weight (IBW), Female: 137.5 lb  % Ideal Body Weight, Female (lb): 166.75 lb  BMI (Calculated): 35.5  BMI Grade: 35 - 39.9 - obesity - grade II  Usual Body Weight (UBW), k.7 kg  % Usual Body Weight: 106.67  % Weight Change From Usual Weight: 6.45 %     Lab/Procedures/Meds  Pertinent Labs Reviewed: reviewed  Pertinent Labs Comments: Na 128L, BUN 33H, Crea 8.1H, Glu 157H, Ca 7.3L, Phos 5.2H, Alb 1.7L  Pertinent Medications Reviewed: reviewed  Pertinent Medications Comments: aspirin, lactobacillus, zofran, renal vitamin    Estimated/Assessed Needs  Weight Used For Calorie Calculations: 62.5 kg (137 lb 12.6 oz)(IBW)  Energy Calorie Requirements (kcal): 1875 (30 kcal/kg)  Energy Need Method: " Kcal/kg  Protein Requirements: 81g (1.3g/kg)  Weight Used For Protein Calculations: 62.5 kg (137 lb 12.6 oz)(IBW)  Estimated Fluid Requirement Method: RDA Method  RDA Method (mL): 1875     Nutrition Prescription Ordered  Current Diet Order: Regular  Oral Nutrition Supplement: Boost Plus    Evaluation of Received Nutrient/Fluid Intake  I/O: 910/347  Energy Calories Required: not meeting needs  Protein Required: not meeting needs  Fluid Required: not meeting needs  Comments: LBM 3/9  Tolerance: tolerating  % Intake of Estimated Energy Needs: 0 - 25 %  % Meal Intake: 0 - 25 %    Nutrition Risk  Level of Risk/Frequency of Follow-up: (2xweekly)     Assessment and Plan   Nutrition Problem  Inadequate energy intake     Related to (etiology):   Decreased appetite     Signs and Symptoms (as evidenced by):   Poor intake at meals     Interventions:  Commercial  beverage     Nutrition Diagnosis Status:   New    Monitor and Evaluation  Food and Nutrient Intake: energy intake  Food and Nutrient Adminstration: diet order  Physical Activity and Function: nutrition-related ADLs and IADLs  Anthropometric Measurements: weight  Biochemical Data, Medical Tests and Procedures: electrolyte and renal panel  Nutrition-Focused Physical Findings: overall appearance     Malnutrition Assessment  NFPE not warranted       Nutrition Follow-Up  RD Follow-up?: Yes

## 2019-03-11 NOTE — PLAN OF CARE
Problem: Oral Intake Inadequate  Goal: Improved Oral Intake  Outcome: Ongoing (interventions implemented as appropriate)  Nutrition Problem  Inadequate energy intake     Related to (etiology):   Decreased appetite     Signs and Symptoms (as evidenced by):   Poor intake at meals     Interventions:  Commercial  beverage     Nutrition Diagnosis Status:   New

## 2019-03-11 NOTE — PLAN OF CARE
Problem: Adult Inpatient Plan of Care  Goal: Plan of Care Review  Outcome: Ongoing (interventions implemented as appropriate)  Plan of care reviewed with patient, understanding verbalized. BG monitored throughout shift.  Instructed to call for any assistance, understanding verbalized.PRN dilaudid given for pain.  PD overnight. Bed alarm on, call light in reach, fall precautions in place.  Will continue to monitor.

## 2019-03-11 NOTE — PROGRESS NOTES
Progress Note  Nephrology      Consult Requested By: Dominick Bueno MD  Reason for Consult: ESRD    SUBJECTIVE:     Review of Systems   Constitutional: Negative for chills and fever.   Respiratory: Negative for cough and shortness of breath.    Cardiovascular: Negative for chest pain and leg swelling.   Gastrointestinal: Positive for abdominal pain and diarrhea (2BM todays very wattery). Negative for blood in stool, nausea and vomiting.        Tolerating liquid diet     Patient Active Problem List   Diagnosis    Neuropathy    ESRD (end stage renal disease) on PD ( initiated dialysis 04/20/2004)    FSGS (focal segmental glomerulosclerosis) biopsy proven with collapsing features    Anemia in chronic kidney disease, on chronic dialysis    Hypertension    Hyperlipidemia    Obesity    CAD (coronary artery disease) with recent PCI 12/18/2012    Diabetes mellitus, type 2    Awaiting organ transplant status    Bulging discs - symptomatic     Spinal stenosis of sacral and sacrococcygeal region    Hypertensive renal disease    Hypoalbuminemia    Cerebral infarction due to embolism of middle cerebral artery    Gait abnormality    Chronic low back pain    DDD (degenerative disc disease), lumbar    PAD (peripheral artery disease)    Peritonitis associated with peritoneal dialysis    Peritonitis due to infected peritoneal dialysis catheter    Abdominal pain    Occult GI bleeding    Hypotension due to hypovolemia    Nausea and vomiting       OBJECTIVE:     Medications:   aspirin  81 mg Oral Daily    atorvastatin  40 mg Oral QHS    calcitRIOL  0.5 mcg Oral QAM    cinacalcet  90 mg Oral QHS    clopidogrel  75 mg Oral Daily    epoetin adonay (PROCRIT) injection  20,000 Units Subcutaneous Every Mon, Wed, Fri    Lactobacillus acidoph-L.bulgar  4 tablet Oral TID WM    ondansetron  8 mg Intravenous Q6H    potassium chloride  20 mEq Oral Daily    ramelteon  8 mg Oral QHS    sevelamer carbonate  800 mg  Oral TID WM    sodium bicarbonate  1,300 mg Oral BID    trimethoprim-sulfamethoxasole (BACTRIM) IVPB (fixed ratio)  500 mg Intravenous Q24H    vitamin renal formula (B-complex-vitamin c-folic acid)  1 capsule Oral Daily       Vitals:    03/11/19 1113   BP: 106/65   Pulse: 83   Resp: 18   Temp: 98.4 °F (36.9 °C)     I/O last 3 completed shifts:  In: 910 [P.O.:410; IV Piggyback:500]  Out: 347 [Other:347]  Physical Exam   Constitutional: She is oriented to person, place, and time. She appears well-developed and well-nourished. No distress.   HENT:   Head: Normocephalic and atraumatic.   Eyes: EOM are normal. No scleral icterus.   Neck: Neck supple. No JVD present.   Cardiovascular: Normal rate and regular rhythm.   No murmur heard.  Pulmonary/Chest: Effort normal and breath sounds normal. No respiratory distress. She has no wheezes. She has no rales.   Abdominal: Soft. Bowel sounds are normal. She exhibits distension. There is tenderness.       Musculoskeletal: She exhibits no edema.   Neurological: She is alert and oriented to person, place, and time.   Skin: Skin is warm and dry. No erythema. No pallor.   Psychiatric: She has a normal mood and affect. Judgment normal.     Laboratory:  Recent Labs   Lab 03/09/19  0416 03/10/19  0425 03/11/19  0652   WBC 8.71 8.15 8.82   HGB 9.0* 8.3* 8.2*   HCT 29.2* 27.2* 26.5*   * 351* 399*   MONO 5.3  0.5 5.0  0.4 4.6  0.4     Recent Labs   Lab 03/09/19  0416 03/10/19  0425 03/11/19  0652   * 128* 130*  131*   K 3.7 3.7 3.7  3.8   CL 90* 90* 91*  91*   CO2 22* 25 25  26   BUN 31* 33* 32*  32*   CREATININE 8.2* 8.1* 8.0*  7.9*   CALCIUM 7.7* 7.3* 7.4*  7.3*   PHOS 4.6* 5.2* 5.1*  5.5*     Labs reviewed  Diagnostic Results:  X-Ray: Reviewed  US: Reviewed  Echo: Reviewed      ASSESSMENT/PLAN:     1. ESRD - usual PD for 12 years with Dr. Watt - 9 hours, 12L, alternating 1.5 and 2.5%. Dry weight 220lb.   2.8L each exchange, but now due to abdominal tenderness  decreased to 2L. 6 exchanges over 8 hours  Peritonitis:  Rothia - sensitivity is still not available, on IV bactrim x10 days, 3 more doses remains + oxacillin 2 gr IP, vancomycin IV to maintain lvl ~ 20    Seen and examined on PD    2. HTN - BP better, tolerating diet, stop IVF    3. Anemia -  Significant drop in Hb--> no evidence of hemolysis, stable from yesterday   Recent Labs   Lab 03/09/19  0416 03/10/19  0425 03/11/19  0652   WBC 8.71 8.15 8.82   HGB 9.0* 8.3* 8.2*   HCT 29.2* 27.2* 26.5*   * 351* 399*     Lab Results   Component Value Date    IRON 19 (L) 09/15/2018    TIBC 182 (L) 09/15/2018    FERRITIN 1,920 (H) 04/16/2016     4. MBD - cont Fosrenol, sensipar and calcitriol,   Lab Results   Component Value Date    .7 (H) 04/19/2016    CALCIUM 7.4 (L) 03/11/2019    CALCIUM 7.3 (L) 03/11/2019    PHOS 5.1 (H) 03/11/2019    PHOS 5.5 (H) 03/11/2019     Recent Labs   Lab 03/09/19  0416 03/10/19  0425 03/11/19  0652   MG 1.6 1.5* 1.6     Lab Results   Component Value Date    QCNCJIBM95XX 8 (L) 04/19/2016     Lab Results   Component Value Date    CO2 25 03/11/2019    CO2 26 03/11/2019         5. Nutrition - tolerating clear liquid, will try to give nepro with meals  Lab Results   Component Value Date    LABPROT 11.0 09/19/2018    ALBUMIN 1.7 (L) 03/11/2019    ALBUMIN 1.7 (L) 03/11/2019     Nepro with meals TID. Renal vitamins daily  6. Worked up by hem/onc in the past - not hypercoagulable - r  Thank you for consult, will follow  With any question please call [phone]  Deanna Ernst    [facility]  ERLIN Watt MD, FACP,   MBoogie Vallecillo MD,   MD WIL Dee, NP  [address]  [place], [address]  [phone]  After hours answering service: [phone]

## 2019-03-11 NOTE — PLAN OF CARE
Problem: Adult Inpatient Plan of Care  Goal: Plan of Care Review  Outcome: Ongoing (interventions implemented as appropriate)  Pt safety maintained. Bed in low and locked position. DM being managed with diet and medication. IV abx, pain and nausea medications administered as ordered. PD to be done this PM. No acute distress noted. Will continue to monitor.

## 2019-03-12 NOTE — CONSULTS
Patient ID: Jenni Todd is a 48 y.o. female.    Chief Complaint: Vomiting (48 year old female presents to ed cc of abdominal pain with diarrhea that began on sunday and emesis today. patient is pd patient last pd exchange was yesterday full treamtent took antibiotic pd treatment today. )      HPI:  48F complains of abdominal pain off and on for a few weeks. Right now it is not particularly bothersome. She has been treated for peritonitis related to PD catheter with ascites cultures positive for enterobacter and staph aureus. She has been on multiple courses of abx without improves. On PD for over 10 years. Has had permacaths placed in each side of her neck in the past but none recently. Never had AVF. Been doing well on PD until recently. Some NV, diarrhea. Getting abx with PD. INR 1.8 on plavix.        Review of Systems   Constitutional: Negative for fever.   HENT: Negative for trouble swallowing.    Respiratory: Negative for shortness of breath.    Cardiovascular: Negative for chest pain.   Gastrointestinal: Positive for abdominal pain, diarrhea, nausea and vomiting. Negative for blood in stool.   Genitourinary: Negative for dysuria.   Musculoskeletal: Negative for gait problem.   Skin: Negative for rash and wound.   Allergic/Immunologic: Negative for immunocompromised state.   Neurological: Negative for weakness.   Hematological: Does not bruise/bleed easily.   Psychiatric/Behavioral: Negative for agitation.       Current Facility-Administered Medications   Medication Dose Route Frequency Provider Last Rate Last Dose    aluminum-magnesium hydroxide-simethicone 200-200-20 mg/5 mL suspension 30 mL  30 mL Oral Q4H PRN Roselyn Jimenes MD        aspirin EC tablet 81 mg  81 mg Oral Daily Carlene Lopez MD   81 mg at 03/12/19 0859    atorvastatin tablet 40 mg  40 mg Oral QHS Carlene Lopez MD   40 mg at 03/11/19 2040    calcitRIOL capsule 0.5 mcg  0.5 mcg Oral QAM Carlene Lopez MD   0.5 mcg at  03/12/19 0612    cinacalcet tablet 90 mg  90 mg Oral QHS Carlene Lopez MD   90 mg at 03/11/19 2040    clopidogrel tablet 75 mg  75 mg Oral Daily Carlene Lopez MD   75 mg at 03/12/19 0900    dextrose 50% injection 12.5 g  12.5 g Intravenous PRN Carlene Lopez MD        dextrose 50% injection 25 g  25 g Intravenous PRN Carlene Lopez MD        epoetin adonay injection 20,000 Units  20,000 Units Subcutaneous Every Mon, Wed, Fri Carmenza Vallecillo MD   20,000 Units at 03/11/19 1142    glucagon (human recombinant) injection 1 mg  1 mg Intramuscular PRN Carlene Lopez MD        glucose chewable tablet 16 g  16 g Oral PRN Carlene Lopez MD        glucose chewable tablet 24 g  24 g Oral PRN Carlene Lopez MD        hydromorphone (PF) injection 1 mg  1 mg Intravenous Q6H PRN Michael Tan III, MD   1 mg at 03/12/19 1054    insulin aspart U-100 pen 0-5 Units  0-5 Units Subcutaneous QID (AC + HS) PRN Carlene Lopez MD   2 Units at 03/06/19 1734    Lactobacillus acidoph-L.bulgar 1 million cell tablet 4 tablet  4 tablet Oral TID  Carmenza Vallecillo MD   4 tablet at 03/12/19 0859    ondansetron injection 4 mg  4 mg Intravenous Q8H PRN Carlene Lopez MD   4 mg at 03/11/19 0304    ondansetron injection 8 mg  8 mg Intravenous Q6H Roselyn Jimenes MD   8 mg at 03/12/19 0859    potassium chloride SA CR tablet 20 mEq  20 mEq Oral Daily Carmenza Vallecillo MD   20 mEq at 03/12/19 0859    promethazine tablet 12.5 mg  12.5 mg Oral Q6H PRN Carlene Lopez MD   12.5 mg at 02/22/19 2056    ramelteon tablet 8 mg  8 mg Oral QHS Hayden Padilla MD   8 mg at 03/11/19 2040    sevelamer carbonate tablet 800 mg  800 mg Oral TID  Carmenza Vallecillo MD   800 mg at 03/11/19 1143    simethicone chewable tablet 125 mg  125 mg Oral Q6H PRN Shelley Lombardi DO   125 mg at 02/28/19 0433    sodium bicarbonate tablet 1,300 mg  1,300 mg Oral BID Sita Bennett MD   1,300 mg at 03/12/19 0859    sodium chloride 0.9%  flush 5 mL  5 mL Intravenous PRN Carlene Lopez MD        sulfamethoxazole-trimethoprim 400-80 mg/5 mL (BACTRIM) 500 mg in dextrose 5 % 500 mL IVPB  500 mg Intravenous Q24H Carmenza Vallecillo .3 mL/hr at 19 1627 500 mg at 19 1627    vitamin renal formula (B-complex-vitamin c-folic acid) 1 mg per capsule 1 capsule  1 capsule Oral Daily Carlene Lopez MD   1 capsule at 19 0900       Review of patient's allergies indicates:   Allergen Reactions    Clindamycin Diarrhea    Flagyl [metronidazole hcl]     Metronidazole        Past Medical History:   Diagnosis Date    Abnormal finding on Pap smear, HPV DNA positive     Anemia associated with chronic renal failure     on Epogen    Blood type B+     Bulging discs - symptomatic      CAD (coronary artery disease)     Diabetes mellitus, type 2     ESRD (end stage renal disease) 2004    FSGS (focal segmental glomerulosclerosis)     with collapsing    Hyperlipidemia     Hypertension     Neuropathy     Obesity     Secondary hyperparathyroidism, renal     TIA (transient ischemic attack)     Uterine fibroid     small uterine        Past Surgical History:   Procedure Laterality Date    CARDIAC CATHETERIZATION      PCI x 2     SECTION, CLASSIC      COLONOSCOPY N/A 2018    [facility]formed [facility] Rodrigue Russell [facility] ([facility]ND FLR)    DEBRIDEMENT-WOUND Left 2016    Performed by Teressa Maddox [facility]    DIALYSIS FISTULA CREATION      multiple fistulas and grafts before PD     INCISION AND DRAINAGE (I&D), LABIAL N/A 2016    Performed by Harmony Fernandez MD at [facility] OR    INCISION AND DRAINAGE (I&D), LABIAL (ADD ON) Left 2016    Performed by [facility]a Maddox MD at [facility] OR    INCISION AND DRAINAGE OF WOUND Left     VULVAR ABCESS WITH NECROSIS    ORIF, HIP Left 2018    Performed by Tevin Grullon MD at [facility] OR    PERITONEAL CATHETER INSERTION      PERMCATH REWIRE- TUNNELED  CATH REWIRE Left 11/13/2017    [facility]roe [facility] CATH LAB    PERMCATH REWIRE- TUNNELED CATH REWIRE N/A 10/5/2017    Per[facility] by [facility]roe [facility] at [facility] CATH LAB    UMBILICAL HERNIA REPAIR         Family History   Problem Relation Age of Onset    Cancer Maternal Grandmother     Cancer Paternal Grandfather     Diabetes Maternal Aunt     Diabetes Paternal Aunt     Kidney disease Neg Hx     Heart disease Neg Hx     Ovarian cancer Neg Hx     Breast cancer Neg Hx        Social History     Socioeconomic History    Marital status: Single     Spouse name: Not on file    Number of children: Not on file    Years of education: Not on file    Highest education level: Not on file   Social Needs    Financial resource strain: Not on file    Food insecurity - worry: Not on file    Food insecurity - inability: Not on file    Transportation needs - medical: Not on file    Transportation needs - non-medical: Not on file   Occupational History     Employer: [employer]   Tobacco Use    Smoking status: Never Smoker    Smokeless tobacco: Never Used   Substance and Sexual Activity    Alcohol use: No     Comment: Pt reports some social use of about 1-2 drinks about every six months.    Drug use: No    Sexual activity: Not Currently     Partners: Male     Birth control/protection: None   Other Topics Concern    Not on file   Social History Narrative     [employer]    One child    History of blood transfusion in 2004    Potential donor ??? brother       Vitals:    03/12/19 1137   BP: (!) 103/54   Pulse: 85   Resp: 18   Temp: 98.3 °F (36.8 °C)       Physical Exam   Constitutional: She is oriented to person, place, and time. She appears well-developed and well-nourished. No distress.   HENT:   Head: Normocephalic and atraumatic.   Eyes: No scleral icterus.   Cardiovascular: Normal rate.   Pulmonary/Chest: Effort normal. No stridor.   Abdominal: Soft. She exhibits  no distension. There is no tenderness.   PD site looks ok.   Ab soft, distended, no peritoneal signs   Lymphadenopathy:     She has no cervical adenopathy.   Neurological: She is alert and oriented to person, place, and time.   Skin: Skin is warm. No erythema.   Psychiatric: She has a normal mood and affect. Her behavior is normal.     K 3.5  Cr 7.8  GFR 6  WBC 9  71% granulocytes      Assessment & Plan:   48F with ESRD now with chronic peritonitis not resolving with peritoneal or IV abx  ID consulted. Discussed with nephrology. Recommended removal and PD catheter and placement of permacath.  Will plan for OR tomorrow

## 2019-03-12 NOTE — ASSESSMENT & PLAN NOTE
Ms. Jenni Todd is a 48 y.o. F w/ PMH of HTN, DM, PAD, Anemia of chronic disease 2/2 CKD, on PD since 2004 presenting to the ED from [facility] on 2/21/2019  for concerns of peritonitis. She has a h/o peritonitis several times in the past as per records. Patient was in her usual state of health until Sunday 2/17. Patient reported that she first had diarrhea and abdominal cramping. Patient had not been sleeping well and not feeling well since Sunday 2/17.. Patient also reported then diaphoresis, fatigue, subjective fevers and loss of appetite.     At [facility] dialysis center the PD fluid was cloudy and on 2/21 PD fluid sent for Cx from [facility] dialysis Center and started on vancomycin and Ceftazidime as outpatient and sent to ED for admission b/o sickness. As per verbal report by nephrology team, the peritoneal fluid cx gre Rothia species and patient was continued on IV Vanco, peritoneal ceftazdime and then on 3/1 added Cefazolin peritoneal with cefazidime till 3/2. Ceftazidime stopped 3/3. Continued on Vancomycin still as of now at time of consult as per levels, intermittenly receiving IV Vanco. Last level 3/12 vanco level was 26.8.     Patient started to feel again abdominal tenderness, looked again sick and PD fluid cx sent again on 3/1/2019 that grew Enterobacter and MSSA. Pt was started on oxacillin via peritoneum and IV bactrim from 3/5/2019 and IV vancomycin was continued. WBC levels in peritoneal fluids were decreasing and decreased to 71 on 3/9 but now have started to increase again and patient again feeling abdominal tenderness.Repeated fluid cell count 3/12/19 and is 1025 with 95% segs. Cx sent also.    Patient has finally consented and will go on HD for now. Planning to remove PD catheter on 3/13/2019 and placed catheter for HD for now.   ID consult called for further reccs for Antibiotics    Peritoneal Cx data  2/21 Rothia Species as per verbal report  3/1 Enterobacter and MSSA  3/8 Fluid Cx sent  Aerobic NGTD. No AFB seen  3/12 Fluid cx sent for aerobic/fungal/AFB=NGTD      Other cx data  2/21 Blood cx no growth  3/1 Dwight cx for Cdiff PCR negative  3/3 Blood cx no growth  3/13 Blood cx sent    2/26 TTE showed decreased EF of 30%, no vegetations seen. In 2/2017 EF was 60-65%    ---3/13 Sent to ICU for GI bleed, acute blood loss anemia, to get 2 units PRBCs then EGD then when stable PD cathter removal. Placed Left femoral trialysis catheter    Currently on IV Vanco intermittently as per levels, Pip tazo added and Fluconazole. Off oxacillin and Bactrim  Recommendations  --- Seems like the Rothia species seen in 2/21 peritoneal fluid cx taken outpatient has been treated with ceftazidime/vanco and cefazolin  --- Started on Fluconazole and Pip-tazo for broader coverage today by primary team. On intermittent Vancomycin levels still high 22  --- Agree to continue Pip tazo that will cover Enterobacter in 3/1 fluid cx and Vancomycin that will cover MSSA. Fluid cx from 3/8 and 3/12 so far NGTD  --- Will need good source control, awaiting PD catheter to be removed when stable  --- We will follow the pending blood cx and peritoneal fluid cultures results with you    ID will follow

## 2019-03-12 NOTE — PLAN OF CARE
Problem: Adult Inpatient Plan of Care  Goal: Plan of Care Review   03/12/19 015   Plan of Care Review   Plan of Care Reviewed With patient   Pt reported pain to R abdomin.  Requested nausea meds twice and pain meds once during night shift.  BS @ 2100 144, no coveage needed. Dsg to PD site, clean dry and in tact.    0600 discussed pt pt starting new IV site since her site needs changing. Pt advised that she is a very hard stick, Nurse will defer.      Tele:pt refuses to use. Box in monitor room.      Bed in lowest position, wheels locked, non skid socks, ID band worn, personal items and call bell with in reach, bed alarm set.

## 2019-03-12 NOTE — PLAN OF CARE
VN cued into pt's room for introduction. VN informed pt that VN would be working along side bedside nurse and PCT throughout shift. Level of present pain assessed. At present no distress noted. Briefly discussed with patient today's plan of care. Patient resistant to communicate with VN. Briefly reinforced with patient High fall risk protocol and interventions that have been initiated and cont be in place for safety. Patient verbalized clear understanding and cooperation using teach back method. Bed alarm presently activated and in use. Will cont to be available to patient and intervene prn.

## 2019-03-12 NOTE — ASSESSMENT & PLAN NOTE
Ms. Jenni Todd is a 48 y.o. F w/ PMH of HTN, DM, PAD, Anemia of chronic disease 2/2 CKD, on PD since 2004 presenting to the ED from [facility] on 2/21/2019  for concerns of peritonitis. She has a h/o peritonitis several times in the past as per records. Patient was in her usual state of health until Sunday 2/17. Patient reported that she first had diarrhea and abdominal cramping. Patient had not been sleeping well and not feeling well since Sunday 2/17.. Patient also reported then diaphoresis, fatigue, subjective fevers and loss of appetite.     At [facility] dialysis center the PD fluid was cloudy and on 2/21 PD fluid sent for Cx from [facility] dialysis Center and started on vancomycin and Ceftazidime as outpatient and sent to ED for admission b/o sickness. As per verbal report by nephrology team, the peritoneal fluid cx gre Rothia species and patient was continued on IV Vanco, peritoneal ceftazdime and then on 3/1 added Cefazolin peritoneal with cefazidime till 3/2. Ceftazidime stopped 3/3. Continued on Vancomycin still as of now at time of consult as per levels, intermittenly receiving IV Vanco. Last level 3/12 vanco level was 26.8.     Patient started to feel again abdominal tenderness, looked again sick and PD fluid cx sent again on 3/1/2019 that grew Enterobacter and MSSA. Pt was started on oxacillin via peritoneum and IV bactrim from 3/5/2019 and IV vancomycin was continued. WBC levels in peritoneal fluids were decreasing and decreased to 71 on 3/9 but now have started to increase again and patient again feeling abdominal tenderness.Repeated fluid cell count 3/12/19 and is 1025 with 95% segs. Cx sent also.    Patient has finally consented and will go on HD for now. Planning to remove PD catheter on 3/13/2019 and placed catheter for HD for now.   ID consult called for further reccs for Antibiotics    Peritoneal Cx data  2/21 Rothia Species as per verbal report  3/1 Enterobacter and MSSA  3/8 Fluid Cx sent  Aerobic NGTD. No AFB seen  3/12 Fluid cx sent for aerobic/fungal/AFB    Other cx data  2/21 Blood cx no growth  3/1 Dwight cx for Cdiff PCR negative  3/3 Blood cx no growth    Currently on IV Vanco intermittently as per levels, Oxacillin via PD, and IV bactrim since 3/5/2019 (bactrim from 3/4)  Recommendations  --- Seems like the Rothia species seen in 2/21 peritoneal fluid cx taken outpatient has been treated with ceftazidime/vanco and cefazolin  --- As per verbal report the plan was to treat Enterobacter and MSSA in peritoneal fluid (on peritoneal cx 3/1/2019) for 10 days. We would recommend to treat it for total of 14 days. Start date was 3/5, end date will be 3/14    --- Would recommend to continue the same abx IV vanco, IV bactrim and peritoneal Oxacillin till she has the PD catheter  --- Once she is started on HD, would stop the above 3 antibiotics and would start her on IV Cefepime 1gm IV Q24hrs (+extra 1gm After HD) that will cover both Enterobacter and MSSA. Does not require Vancomycin right now. Do not restart Vancomycin  --- We will follow the pending blood cx and peritoneal fluid cultures results with you  --- Discussed with nephrology team today    Thank you for consulting us for this interesting patient  ID will follow

## 2019-03-12 NOTE — PROGRESS NOTES
Progress Note  Nephrology      Consult Requested By: Dominick Bueno MD  Reason for Consult: ESRD    SUBJECTIVE:     Review of Systems   Constitutional: Negative for chills and fever.   Respiratory: Negative for cough and shortness of breath.    Cardiovascular: Negative for chest pain and leg swelling.   Gastrointestinal: Positive for abdominal pain, diarrhea (2BM todays very wattery), nausea and vomiting. Negative for blood in stool.     Patient Active Problem List   Diagnosis    Neuropathy    ESRD (end stage renal disease) on PD ( initiated dialysis 04/20/2004)    FSGS (focal segmental glomerulosclerosis) biopsy proven with collapsing features    Anemia in chronic kidney disease, on chronic dialysis    Hypertension    Hyperlipidemia    Obesity    CAD (coronary artery disease) with recent PCI 12/18/2012    Diabetes mellitus, type 2    Awaiting organ transplant status    Bulging discs - symptomatic     Spinal stenosis of sacral and sacrococcygeal region    Hypertensive renal disease    Hypoalbuminemia    Cerebral infarction due to embolism of middle cerebral artery    Gait abnormality    Chronic low back pain    DDD (degenerative disc disease), lumbar    PAD (peripheral artery disease)    Peritonitis associated with peritoneal dialysis    Peritonitis due to infected peritoneal dialysis catheter    Abdominal pain    Occult GI bleeding    Hypotension due to hypovolemia    Nausea and vomiting       OBJECTIVE:     Medications:   aspirin  81 mg Oral Daily    atorvastatin  40 mg Oral QHS    calcitRIOL  0.5 mcg Oral QAM    cinacalcet  90 mg Oral QHS    clopidogrel  75 mg Oral Daily    epoetin adonay (PROCRIT) injection  20,000 Units Subcutaneous Every Mon, Wed, Fri    Lactobacillus acidoph-L.bulgar  4 tablet Oral TID WM    ondansetron  8 mg Intravenous Q6H    potassium chloride  20 mEq Oral Daily    ramelteon  8 mg Oral QHS    sevelamer carbonate  800 mg Oral TID WM    sodium  bicarbonate  1,300 mg Oral BID    trimethoprim-sulfamethoxasole (BACTRIM) IVPB (fixed ratio)  500 mg Intravenous Q24H    vitamin renal formula (B-complex-vitamin c-folic acid)  1 capsule Oral Daily       Vitals:    03/12/19 1137   BP: (!) 103/54   Pulse: 85   Resp: 18   Temp: 98.3 °F (36.8 °C)     I/O last 3 completed shifts:  In: 90 [P.O.:90]  Out: 333 [Other:333]  Physical Exam   Constitutional: She is oriented to person, place, and time. She appears well-developed and well-nourished. No distress.   HENT:   Head: Normocephalic and atraumatic.   Eyes: EOM are normal. No scleral icterus.   Neck: Neck supple. No JVD present.   Cardiovascular: Normal rate and regular rhythm.   No murmur heard.  Pulmonary/Chest: Effort normal and breath sounds normal. No respiratory distress. She has no wheezes. She has no rales.   Abdominal: Soft. Bowel sounds are normal. She exhibits distension. There is tenderness.       Musculoskeletal: She exhibits no edema.   Neurological: She is alert and oriented to person, place, and time.   Skin: Skin is warm and dry. No erythema. No pallor.   Psychiatric: She has a normal mood and affect. Judgment normal.     Laboratory:  Recent Labs   Lab 03/10/19  0425 03/11/19  0652 03/12/19  0654   WBC 8.15 8.82 9.18   HGB 8.3* 8.2* 8.7*   HCT 27.2* 26.5* 28.5*   * 399* 381*   MONO 5.0  0.4 4.6  0.4 4.4  0.4     Recent Labs   Lab 03/10/19  0425 03/11/19  0652 03/12/19  0654   * 130*  131* 130*  131*   K 3.7 3.7  3.8 3.5  3.6   CL 90* 91*  91* 88*  89*   CO2 25 25  26 28  28   BUN 33* 32*  32* 30*  29*   CREATININE 8.1* 8.0*  7.9* 7.8*  7.8*   CALCIUM 7.3* 7.4*  7.3* 7.4*  7.4*   PHOS 5.2* 5.1*  5.5* 5.5*  5.5*     Labs reviewed  Diagnostic Results:  X-Ray: Reviewed  US: Reviewed  Echo: Reviewed      ASSESSMENT/PLAN:     1. ESRD - usual PD for 12 years with Dr. Watt - 9 hours, 12L, alternating 1.5 and 2.5%. Dry weight 220lb.   2.8L each exchange, but now due to abdominal  tenderness decreased to 2L. 6 exchanges over 8 hours  Peritonitis:  Rothia - sensitivity is still not available, on IV bactrim x10 days, 2 more doses remains + oxacillin 2 gr IP, vancomycin IV to maintain lvl ~ 20  Worsening cell count, worsenign albumin 1.7-1.8     Ref. Range 3/8/2019 14:42 3/9/2019 11:42 3/10/2019 13:29 3/11/2019 15:39   WBC, Body Fluid Latest Units: /cu mm 507 71 187 1143       Transition to HD --> today re-culture and repeat cell count, last dose of IP oxacillin, maria AM remove PD cath and place tunneled CVC --> consulted Dr. Solis  Consult ID    Seen and examined on PD        2. HTN - BP better,   3. Anemia -  Of CKD treated with Epogen -> 20K Epo q MWF  Recent Labs   Lab 03/10/19  0425 03/11/19  0652 03/12/19  0654   WBC 8.15 8.82 9.18   HGB 8.3* 8.2* 8.7*   HCT 27.2* 26.5* 28.5*   * 399* 381*     Lab Results   Component Value Date    IRON 19 (L) 09/15/2018    TIBC 182 (L) 09/15/2018    FERRITIN 1,920 (H) 04/16/2016     4. MBD - cont Fosrenol, sensipar and calcitriol,   Lab Results   Component Value Date    .7 (H) 04/19/2016    CALCIUM 7.4 (L) 03/12/2019    CALCIUM 7.4 (L) 03/12/2019    PHOS 5.5 (H) 03/12/2019    PHOS 5.5 (H) 03/12/2019     Recent Labs   Lab 03/10/19  0425 03/11/19  0652 03/12/19  0654   MG 1.5* 1.6 1.4*     Lab Results   Component Value Date    BMLQMFEX07IR 8 (L) 04/19/2016     Lab Results   Component Value Date    CO2 28 03/12/2019    CO2 28 03/12/2019         5. Nutrition - tolerating clear liquid, will try to give nepro with meals  Lab Results   Component Value Date    LABPROT 18.3 (H) 03/12/2019    ALBUMIN 1.8 (L) 03/12/2019    ALBUMIN 1.8 (L) 03/12/2019     Nepro with meals TID. Renal vitamins daily  6. Worked up by hem/onc in the past - not hypercoagulable - r  Thank you for consult, will follow  With any question please call [phone]  Deanna Ernst    [facility]  ERLIN Watt MD, VONDA ULLOA MD,   MD WIL Dee,  NP  [address]  [place], [id number]  [phone]  After hours answering service: [phone]

## 2019-03-12 NOTE — ANESTHESIA PREPROCEDURE EVALUATION
03/12/2019  Pre-operative evaluation for Procedure(s) (LRB): PD catheter removal    Jenni Todd is a 48 y.o. female  with a history of CAD s/p PCI in 2012 with BMS on DAPT, well controlled HTN, HLD, poorly controlled DM II, ESRD on PD, Von Willebrands disease and Factor V Liden here for peritoneal dialysis catheter removal.            Pre-op Assessment    I have reviewed the Patient Summary Reports.     I have reviewed the Nursing Notes.   I have reviewed the Medications.     Review of Systems  Anesthesia Hx:  No problems with previous Anesthesia  History of prior surgery of interest to airway management or planning: Denies Family Hx of Anesthesia complications.   Denies Personal Hx of Anesthesia complications.   Social:  Non-Smoker    Hematology/Oncology:     Oncology Normal    -- Anemia: Anemia of Chronic Kidney Disease  Chronic  Hematology Comments: H/H 8.7/28    EENT/Dental:EENT/Dental Normal   Cardiovascular:   Exercise tolerance: poor Hypertension, well controlled Past MI CAD  CABG/stent (BMS 2012)   Denies Angina. CHF         PVD ECG has been reviewed. TTE: EF 30%, G2DD, Mild aortic valve stenosis. ARNOLD 1.68 cm2; peak velocity is 2.28 m/s; mean gradient is 12 mmHg.    EKG: NSR    Stents 2005   Pulmonary:  Pulmonary Normal    Renal/:   Chronic Renal Disease (on PD x12 years), ESRD Peritomeal dialysys    Dilutional hyochloremic hyponatremia Na 130    Corrected Ca wnl   Hepatic/GI:  Hepatic/GI Normal    Musculoskeletal:   Arthritis   Spine Disorders: (Spinal stenosis) lumbar Chronic Pain and Disc disease    Neurological:   TIA, CVA, no residual symptoms Headaches Uses scooter to get around  Peripheral Neuropathy    Endocrine:   Diabetes, well controlled, using insulin A1C 6.3   Dermatological:  Skin Normal    Psych:  Psychiatric Normal           Physical Exam  General:  Obesity     Airway/Jaw/Neck:  Airway Findings: Mouth Opening: Normal Tongue: Normal  General Airway Assessment: Adult  Mallampati: IV  TM Distance: Normal, at least 6 cm  Jaw/Neck Findings:     Neck ROM: Normal ROM      Dental:  Dental Findings: In tact   Chest/Lungs:  Chest/Lungs Findings: Clear to auscultation, Normal Respiratory Rate     Heart/Vascular:  Heart Findings: Rate: Normal  Rhythm: Regular Rhythm  Sounds: Normal     Abdomen:  Abdomen Findings:  Normal, Soft, Tenderness            Anesthesia Plan  Type of Anesthesia, risks & benefits discussed:  Anesthesia Type:  MAC, general  Patient's Preference:   Intra-op Monitoring Plan: standard ASA monitors  Intra-op Monitoring Plan Comments:   Post Op Pain Control Plan: multimodal analgesia  Post Op Pain Control Plan Comments:   Induction:   IV  Beta Blocker:  Patient is on a Beta-Blocker and has received one dose within the past 24 hours (No further documentation required).       Informed Consent: Patient understands risks and agrees with Anesthesia plan.  Questions answered. Anesthesia consent signed with patient.  ASA Score: 4     Day of Surgery Review of History & Physical: I have interviewed and examined the patient. I have reviewed the patient's H&P dated:   Significant changes noted:, Patient now with reduced EF 65% to 30% from her recent echo on 2/3019.  Patient is currently asymptomatic and able to lie flat without dyspnea with clear lung sounds and currently no abnormal heart sounds heard on auscultation. Primary team family medicine notified of recent TTE study.   Surgeon notified.   H&P completed by Anesthesiologist.       Ready For Surgery From Anesthesia Perspective.

## 2019-03-12 NOTE — HPI
Ms. Jenni Todd is a 48 y.o. F w/ PMH of HTN, DM, PAD, Anemia of chronic disease 2/2 CKD, on PD since 2004 presenting to the ED from [facility] on 2/21/2019  for concerns of peritonitis. She has a h/o peritonitis several times in the past as per records. Patient was in her usual state of health until Sunday 2/17. Patient reported that she first had diarrhea and abdominal cramping. Patient had not been sleeping well and not feeling well since Sunday 2/17.. Patient also reported then diaphoresis, fatigue, subjective fevers and loss of appetite.     At [facility] dialysis center the PD fluid was cloudy and on 2/21 PD fluid sent for Cx from [facility] dialysis Center and started on vancomycin and Ceftazidime as outpatient and sent to ED for admission b/o sickness. As per verbal report by nephrology team, the peritoneal fluid cx gre Rothia species and patient was continued on IV Vanco, peritoneal ceftazdime and then on 3/1 added Cefazolin peritoneal with cefazidime till 3/2. Ceftazidime stopped 3/3. Continued on Vancomycin still as of now at time of consult as per levels, intermittenly receiving IV Vanco. Last level 3/12 vanco level was 26.8.     Patient started to feel again abdominal tenderness, looked again sick and PD fluid cx sent again on 3/1/2019 that grew Enterobacter and MSSA. Pt was started on oxacillin via peritoneum and IV bactrim from 3/5/2019 and IV vancomycin was continued. WBC levels in peritoneal fluids were decreasing and decreased to 71 on 3/9 but now have started to increase again and patient again feeling abdominal tenderness.Repeated fluid cell count 3/12/19 and is 1025 with 95% segs. Cx sent also.    Patient has finally consented and will go on HD for now. Planning to remove PD catheter on 3/13/2019 and placed catheter for HD for now.   ID consult called for further reccs for Antibiotics    Peritoneal Cx data  2/21 Rothia Species as per verbal report  3/1 Enterobacter and MSSA  3/8 Fluid Cx sent Aerobic  NGTD. No AFB seen  3/12 Fluid cx sent for aerobic/fungal/AFB=NGTD      Other cx data  2/21 Blood cx no growth  3/1 Dwight cx for Cdiff PCR negative  3/3 Blood cx no growth  3/13 Blood cx NGTD    2/26 TTE showed decreased EF of 30%, no vegetations seen. In 2/2017 EF was 60-65%    ---3/13 Sent to ICU for GI bleed, acute blood loss anemia, to get 3 units PRBCs then EGD then when stable PD cathter removal. Placed Left femoral trialysis catheter  3/14 2nd EGD done shows esophagitis, gastric non bleeding ulcer. PD catheter removed  3/15 was started on CRRT but later had to hold because L femoral line not working. Hb drops in 6s again getting PRBC transfusion 1 unit later may get more. Surgery team called to evaluate trialysis femoral line. Left groin line changed over wire but still not working - CRRT interrupted again; transfused total of 3 units PRBC  3/16 Nephrology evaluating options for HD access - getting longer catheter and planning CRRT today.   3/17 New HD line placed today again due to nonfunctioning with CRRT over night - now on CRRT

## 2019-03-12 NOTE — CONSULTS
[facility]  Infectious Disease  Consult Note    Patient Name: Jenni Todd  MRN: [id number]  Admission Date: 2/21/2019  Hospital Length of Stay: 16 days  Attending Physician: Dominick Bueno MD  Primary Care Provider: Michael Tan Iii, MD     Isolation Status: No active isolations    Patient information was obtained from patient, past medical records, nephrology team and ER records.      Inpatient consult to Infectious Diseases  Consult performed by: Peterson Charlton MD  Consult ordered by: Deanna Ernst MD  Reason for consult: recurrent peritonitis        Assessment/Plan:     Peritonitis due to infected peritoneal dialysis catheter     Ms. Jenni Todd is a 48 y.o. F w/ PMH of HTN, DM, PAD, Anemia of chronic disease 2/2 CKD, on PD since 2004 presenting to the ED from [facility] on 2/21/2019  for concerns of peritonitis. She has a h/o peritonitis several times in the past as per records. Patient was in her usual state of health until Sunday 2/17. Patient reported that she first had diarrhea and abdominal cramping. Patient had not been sleeping well and not feeling well since Sunday 2/17.. Patient also reported then diaphoresis, fatigue, subjective fevers and loss of appetite.     At [facility] the PD fluid was cloudy and on 2/21 PD fluid sent for Cx from [facility] and started on vancomycin and Ceftazidime as outpatient and sent to ED for admission b/o sickness. As per verbal report by nephrology team, the peritoneal fluid cx gre Rothia species and patient was continued on IV Vanco, peritoneal ceftazdime and then on 3/1 added Cefazolin peritoneal with cefazidime till 3/2. Ceftazidime stopped 3/3. Continued on Vancomycin still as of now at time of consult as per levels, intermittenly receiving IV Vanco. Last level 3/12 vanco level was 26.8.     Patient started to feel again abdominal tenderness, looked again sick and PD fluid cx sent again on 3/1/2019 that grew  Enterobacter and MSSA. Pt was started on oxacillin via peritoneum and IV bactrim from 3/5/2019 and IV vancomycin was continued. WBC levels in peritoneal fluids were decreasing and decreased to 71 on 3/9 but now have started to increase again and patient again feeling abdominal tenderness.Repeated fluid cell count 3/12/19 and is 1025 with 95% segs. Cx sent also.    Patient has finally consented and will go on HD for now. Planning to remove PD catheter on 3/13/2019 and placed catheter for HD for now.   ID consult called for further reccs for Antibiotics    Peritoneal Cx data  2/21 Rothia Species as per verbal report  3/1 Enterobacter and MSSA  3/8 Fluid Cx sent Aerobic NGTD. No AFB seen  3/12 Fluid cx sent for aerobic/fungal/AFB    Other cx data  2/21 Blood cx no growth  3/1 Dwight cx for Cdiff PCR negative  3/3 Blood cx no growth    2/26 TTE showed decreased EF of 30%, no vegetations seen. In 2/2017 EF was 60-65%    Currently on IV Vanco intermittently as per levels, Oxacillin via PD, and IV bactrim since 3/5/2019 (bactrim from 3/4)  Recommendations  --- Noticed peritoneal cultures were repeated today- Thank you  --- Seems like the Rothia species seen in 2/21 peritoneal fluid cx taken outpatient has been treated with ceftazidime/vanco and cefazolin  --- As per verbal report the plan was to treat Enterobacter and MSSA in peritoneal fluid (on peritoneal cx 3/1/2019) for 10 days. We would recommend to treat it for total of 14 days. Start date was 3/5, end date will be 3/14    --- Would recommend to continue the same abx IV vanco, IV bactrim and peritoneal Oxacillin till she has the PD catheter  --- Once she is started on HD, would stop the above 3 antibiotics and would start her on IV Cefepime 1gm IV Q24hrs (+extra 1gm After HD) that will cover both Enterobacter and MSSA. Does not require Vancomycin right now. Do not restart Vancomycin  --- We will follow the pending blood cx and peritoneal fluid cultures results with  you  --- Discussed with nephrology team today    Thank you for consulting us for this interesting patient  ID will follow         Thank you for your consult. I will follow-up with patient. Please contact us if you have any additional questions.    Peterson Charlton MD   ID Fellow  Infectious Disease  [facility]    Subjective:     Principal Problem: Peritonitis associated with peritoneal dialysis    HPI:  Ms. Jenni Todd is a 48 y.o.  F w/ PMH of HTN, DM, PAD, Anemia of chronic disease 2/2 CKD, on PD since 2004 presenting to the ED from [facility] on 2/21/2019  for concerns of peritonitis. She has a h/o peritonitis several times in the past as per records. Patient was in her usual state of health until Sunday 2/17. Patient reported that she first had diarrhea and abdominal cramping. Patient had not been sleeping well and not feeling well since Sunday 2/17.. Patient also reported then diaphoresis, fatigue, subjective fevers and loss of appetite.     At [facility] the PD fluid was cloudy and on 2/21 PD fluid sent for Cx from [facility] and started on vancomycin and Ceftazidime as outpatient and sent to ED for admission b/o sickness. As per verbal report by nephrology team, the peritoneal fluid cx gre Rothia species and patient was continued on IV Vanco, peritoneal ceftazdime and then on 3/1 added Cefazolin peritoneal with cefazidime till 3/2. Ceftazidime stopped 3/3. Continued on Vancomycin still as of now at time of consult as per levels, intermittenly receiving IV Vanco. Last level 3/12 vanco level was 26.8.     Patient started to feel again abdominal tenderness, looked again sick and PD fluid cx sent again on 3/1/2019 that grew Enterobacter and MSSA. Pt was started on oxacillin via peritoneum and IV bactrim from 3/5/2019 and IV vancomycin was continued. WBC levels in peritoneal fluids were decreasing and decreased to 71 on 3/9 but now have started to increase again and  patient again feeling abdominal tenderness.Repeated fluid cell count 3/12/19 and is 1025 with 95% segs. Cx sent also.    Patient has finally consented and will go on HD for now. Planning to remove PD catheter on 3/13/2019 and placed catheter for HD for now.   ID consult called for further reccs for Antibiotics    Peritoneal Cx data   Rothia Species as per verbal report  3/1 Enterobacter and MSSA  3/8 Fluid Cx sent Aerobic NGTD. No AFB seen  3/12 Fluid cx sent for aerobic/fungal/AFB    Other cx data   Blood cx no growth  3/1 Dwight cx for Cdiff PCR negative  3/3 Blood cx no growth    Currently on IV Vanco intermittently as per levels, Oxacillin via PD, and IV bactrim    Patient seen today, c/o some abdominal discomfort, has some diarrhea but better than before      Past Medical History:   Diagnosis Date    Abnormal finding on Pap smear, HPV DNA positive     Anemia associated with chronic renal failure     on Epogen    Blood type B+     Bulging discs - symptomatic      CAD (coronary artery disease)     Diabetes mellitus, type 2     ESRD (end stage renal disease) 2004    FSGS (focal segmental glomerulosclerosis)     with collapsing    Hyperlipidemia     Hypertension     Neuropathy     Obesity     Secondary hyperparathyroidism, renal     TIA (transient ischemic attack)     Uterine fibroid     small uterine        Past Surgical History:   Procedure Laterality Date    CARDIAC CATHETERIZATION      PCI x 2     SECTION, CLASSIC      COLONOSCOPY N/A 2018    Performed by Rodrigue Russell MD at [facility] ENDO (2ND FLR)    DEBRIDEMENT-WOUND Left 2016    Performed by [facility]ressa Maddox MD at [facility]    DIALYSIS FISTULA CREATION      multiple fistulas and grafts before PD     INCISION AND DRAINAGE (I&D), LABIAL N/A 2016    Performed by Harmony Fernandez MD at [facility] OR    INCISION AND DRAINAGE (I&D), LABIAL (ADD ON) Left 2016    Performed by [facility]ressa Maddox MD at  [facility] OR    INCISION AND DRAINAGE OF WOUND Left 2016    VULVAR ABCESS WITH NECROSIS    ORIF, HIP Left 9/13/2018    Performed by Tevin Grullon MD at [facility] OR    PERITONEAL CATHETER INSERTION      PERMCATH REWIRE- TUNNELED CATH REWIRE Left 11/13/2017    Performed by Baldev Munroe MD at [facility] CATH LAB    PERMCATH REWIRE- TUNNELED CATH REWIRE N/A 10/5/2017    Performed by Baldev Munroe MD at [facility] CATH LAB    UMBILICAL HERNIA REPAIR         Review of patient's allergies indicates:   Allergen Reactions    Clindamycin Diarrhea    Flagyl [metronidazole hcl]     Metronidazole        Medications:  Medications Prior to Admission   Medication Sig    amLODIPine (NORVASC) 5 MG tablet Take 1 tablet (5 mg total) by mouth once daily.    aspirin (ECOTRIN) 81 MG EC tablet Take 1 tablet (81 mg total) by mouth once daily.    atorvastatin (LIPITOR) 40 MG tablet Take 40 mg by mouth every evening.     calcitRIOL (ROCALTROL) 0.5 MCG Cap Take 1 capsule by mouth every morning.     carvedilol (COREG) 3.125 MG tablet Take 1 tablet (3.125 mg total) by mouth 2 (two) times daily.    cinacalcet (SENSIPAR) 90 MG Tab Take 1 tablet by mouth every evening.     clopidogrel (PLAVIX) 75 mg tablet Take 1 tablet (75 mg total) by mouth once daily.    epoetin adonay 10,000 unit/mL Soln 1 mL, epoetin adonay 20,000 unit/mL Soln 1 mL Inject 30,000 Units into the vein every 14 (fourteen) days.    gabapentin (NEURONTIN) 100 MG capsule 1 capsule 3 (three) times daily.    heparin sodium,porcine (HEPARIN, PORCINE,) 5,000 unit/mL injection Inject 1.6 mLs (8,000 Units total) into the skin every 12 (twelve) hours.    HUMULIN R REGULAR U-100 INSULN 100 unit/mL injection Inject 200 units into dialysis bag every evening.    nateglinide (STARLIX) 120 MG tablet STARLIX 120MG 30 MINUTES BEFORE EACH MEAL.    ONETOUCH VERIO Strp USE 1 STRIP ONCE DAILY IN VITRO    PLAVIX 75 mg tablet TAKE 1 BY MOUTH DAILY (Patient taking differently: TAKE 1 BY MOUTH EVERY  MORNING)    sevelamer carbonate (RENVELA) 800 mg Tab Take 3 to 4 tablets by mouth twice daily as needed    vitamin renal formula, B-complex-vitamin c-folic acid, (B COMPLEX-C-FOLIC ACID) 1 mg Cap Take 1 capsule by mouth once daily. 1 Capsule Oral Every day     Antibiotics (From admission, onward)    None        Antifungals (From admission, onward)    None        Antivirals (From admission, onward)    None           Immunization History   Administered Date(s) Administered    Influenza - High Dose 10/08/2018    Pneumococcal Conjugate - 13 Valent 11/21/2016    Td - PF (ADULT) 05/20/2016       Family History     Problem Relation (Age of Onset)    Cancer Maternal Grandmother, Paternal Grandfather    Diabetes Maternal Aunt, Paternal Aunt        Social History     Socioeconomic History    Marital status: Single     Spouse name: None    Number of children: None    Years of education: None    Highest education level: None   Social Needs    Financial resource strain: None    Food insecurity - worry: None    Food insecurity - inability: None    Transportation needs - medical: None    Transportation needs - non-medical: None   Occupational History     Employer: [employer]   Tobacco Use    Smoking status: Never Smoker    Smokeless tobacco: Never Used   Substance and Sexual Activity    Alcohol use: No     Comment: Pt reports some social use of about 1-2 drinks about every six months.    Drug use: No    Sexual activity: Not Currently     Partners: Male     Birth control/protection: None   Other Topics Concern    None   Social History Narrative     Dawit Guevara    One child    History of blood transfusion in 2004    Potential donor ??? brother     Review of Systems   Constitutional: Positive for activity change, appetite change and fatigue. Negative for diaphoresis.   HENT: Negative for trouble swallowing.    Eyes: Negative for photophobia and pain.   Respiratory: Positive for  shortness of breath. Negative for cough, choking and wheezing.    Gastrointestinal: Positive for abdominal pain, diarrhea and nausea. Negative for abdominal distention.   Genitourinary: Negative for dysuria and flank pain.     Objective:     Vital Signs (Most Recent):  Temp: 96.4 °F (35.8 °C) (03/12/19 1646)  Pulse: 76 (03/12/19 1646)  Resp: 17 (03/12/19 1646)  BP: 131/65 (03/12/19 1646)  SpO2: 98 % (03/12/19 1558) Vital Signs (24h Range):  Temp:  [96.2 °F (35.7 °C)-98.3 °F (36.8 °C)] 96.4 °F (35.8 °C)  Pulse:  [76-85] 76  Resp:  [14-18] 17  SpO2:  [93 %-98 %] 98 %  BP: (103-131)/(54-65) 131/65     Weight: 99 kg (218 lb 4.1 oz)  Body mass index is 34.18 kg/m².    Estimated Creatinine Clearance: 10.6 mL/min (A) (based on SCr of 7.8 mg/dL (H)).    Physical Exam   Constitutional: She is oriented to person, place, and time. She appears well-developed and well-nourished.   HENT:   Head: Normocephalic and atraumatic.   Eyes: EOM are normal. Pupils are equal, round, and reactive to light. No scleral icterus.   Neck: Normal range of motion. Neck supple.   Cardiovascular: Normal rate and regular rhythm.   Murmur heard.  Pulmonary/Chest: Effort normal and breath sounds normal. No respiratory distress. She has no wheezes. She has no rales.   Abdominal: Soft. Bowel sounds are normal. There is tenderness. There is no rebound.   + PD catheter, no erythema, no drainage   Musculoskeletal: She exhibits no edema or tenderness.   Lymphadenopathy:     She has no cervical adenopathy.   Neurological: She is alert and oriented to person, place, and time.   BL UE and BL LE no gross motor deficits   Skin: Skin is warm.   Dry skin noted on abdomen   Psychiatric:   Calm and cooperative   Nursing note and vitals reviewed.      Significant Labs:   CBC:   Recent Labs   Lab 03/11/19  0652 03/12/19  0654   WBC 8.82 9.18   HGB 8.2* 8.7*   HCT 26.5* 28.5*   * 381*     CMP:   Recent Labs   Lab 03/11/19  0652 03/12/19  0654   *  131*  130*  131*   K 3.7  3.8 3.5  3.6   CL 91*  91* 88*  89*   CO2 25  26 28  28   *  147* 141*  143*   BUN 32*  32* 30*  29*   CREATININE 8.0*  7.9* 7.8*  7.8*   CALCIUM 7.4*  7.3* 7.4*  7.4*   PROT 6.2 6.6   ALBUMIN 1.7*  1.7* 1.8*  1.8*   BILITOT 0.2 0.3   ALKPHOS 124 131   AST 13 16   ALT <5* <5*   ANIONGAP 14  14 14  14   EGFRNONAA 5*  5* 6*  6*     Microbiology Results (last 7 days)     Procedure Component Value Units Date/Time    Fungus culture [[id number]] Collected:  03/12/19 1431    Order Status:  Sent Specimen:  Body Fluid from Peritoneal Fluid Updated:  03/12/19 1432    Gram stain [[id number]] Collected:  03/12/19 1431    Order Status:  Sent Specimen:  Body Fluid from Peritoneal Fluid Updated:  03/12/19 1432    Aerobic culture [[id number]] Collected:  03/12/19 1431    Order Status:  Sent Specimen:  Body Fluid from Peritoneal Fluid Updated:  03/12/19 1432    Aerobic culture [[id number]] Collected:  03/08/19 1729    Order Status:  Completed Specimen:  Body Fluid from Peritoneal Fluid Updated:  03/12/19 1014     Aerobic Bacterial Culture No growth    Culture, Anaerobe [[id number]] Collected:  03/08/19 1729    Order Status:  Completed Specimen:  Body Fluid from Peritoneal Fluid Updated:  03/12/19 0733     Anaerobic Culture Culture in progress    Blood culture [[id number]] Collected:  03/03/19 1625    Order Status:  Completed Specimen:  Blood Updated:  03/08/19 2012     Blood Culture, Routine No growth after 5 days.    Culture, Anaerobe [[id number]] Collected:  03/01/19 1542    Order Status:  Completed Specimen:  Body Fluid from Abdomen Updated:  03/08/19 0819     Anaerobic Culture No anaerobes isolated    Culture, Fluid  (Aerobic) [[id number]]  (Susceptibility) Collected:  03/01/19 1439    Order Status:  Completed Specimen:  Body Fluid from Ascites Updated:  03/07/19 1001     AEROBIC CULTURE - FLUID --     ENTEROBACTER CLOACAE  From broth only       AEROBIC CULTURE - FLUID --      STAPHYLOCOCCUS AUREUS  From broth only       Gram Stain Result Rare WBC's      No organisms seen    Fungus culture [[id number]] Collected:  03/01/19 1542    Order Status:  Completed Specimen:  Body Fluid from Abdomen Updated:  03/06/19 0857     Fungus (Mycology) Culture Culture in progress        All pertinent labs within the past 24 hours have been reviewed.    Significant Imaging: I have reviewed all pertinent imaging results/findings within the past 24 hours.

## 2019-03-12 NOTE — SUBJECTIVE & OBJECTIVE
Past Medical History:   Diagnosis Date    Abnormal finding on Pap smear, HPV DNA positive     Anemia associated with chronic renal failure     on Epogen    Blood type B+     Bulging discs - symptomatic      CAD (coronary artery disease)     Diabetes mellitus, type 2     ESRD (end stage renal disease) 2004    FSGS (focal segmental glomerulosclerosis)     with collapsing    Hyperlipidemia     Hypertension     Neuropathy     Obesity     Secondary hyperparathyroidism, renal     TIA (transient ischemic attack)     Uterine fibroid     small uterine        Past Surgical History:   Procedure Laterality Date    CARDIAC CATHETERIZATION      PCI x 2     SECTION, CLASSIC      COLONOSCOPY N/A 2018    Performed by Rodrigue Russell MD at [facility] ENDO (2ND FLR)    DEBRIDEMENT-WOUND Left 2016    Performed by [facility]ressa Maddox [facility] at [facility] OR    DIALYSIS FISTULA CREATION      multiple fistulas and grafts before PD     INCISION AND DRAINAGE (I&D), LABIAL N/A 2016    Performed by Harmony Fernandez MD at [facility] OR    INCISION AND DRAINAGE (I&D), LABIAL (ADD ON) Left 2016    Performed by Teressa Maddox MD at [facility] OR    INCISION AND DRAINAGE OF WOUND Left     VULVAR ABCESS WITH NECROSIS    ORIF, HIP Left 2018    Performed by Tevin Grullon MD at [facility] OR    PERITONEAL CATHETER INSERTION      PERMCATH REWIRE- TUNNELED CATH REWIRE Left 2017    Performed by Baldev Munroe MD at [facility] CATH LAB    PERMCATH REWIRE- TUNNELED CATH REWIRE N/A 10/5/2017    Performed by Baldev Munroe MD at [facility] CATH LAB    UMBILICAL HERNIA REPAIR         Review of patient's allergies indicates:   Allergen Reactions    Clindamycin Diarrhea    Flagyl [metronidazole hcl]     Metronidazole        Medications:  Medications Prior to Admission   Medication Sig    amLODIPine (NORVASC) 5 MG tablet Take 1 tablet (5 mg total) by mouth once daily.    aspirin (ECOTRIN) 81 MG EC tablet Take  1 tablet (81 mg total) by mouth once daily.    atorvastatin (LIPITOR) 40 MG tablet Take 40 mg by mouth every evening.     calcitRIOL (ROCALTROL) 0.5 MCG Cap Take 1 capsule by mouth every morning.     carvedilol (COREG) 3.125 MG tablet Take 1 tablet (3.125 mg total) by mouth 2 (two) times daily.    cinacalcet (SENSIPAR) 90 MG Tab Take 1 tablet by mouth every evening.     clopidogrel (PLAVIX) 75 mg tablet Take 1 tablet (75 mg total) by mouth once daily.    epoetin adonay 10,000 unit/mL Soln 1 mL, epoetin adonay 20,000 unit/mL Soln 1 mL Inject 30,000 Units into the vein every 14 (fourteen) days.    gabapentin (NEURONTIN) 100 MG capsule 1 capsule 3 (three) times daily.    heparin sodium,porcine (HEPARIN, PORCINE,) 5,000 unit/mL injection Inject 1.6 mLs (8,000 Units total) into the skin every 12 (twelve) hours.    HUMULIN R REGULAR U-100 INSULN 100 unit/mL injection Inject 200 units into dialysis bag every evening.    nateglinide (STARLIX) 120 MG tablet STARLIX 120MG 30 MINUTES BEFORE EACH MEAL.    ONETOUCH VERIO Strp USE 1 STRIP ONCE DAILY IN VITRO    PLAVIX 75 mg tablet TAKE 1 BY MOUTH DAILY (Patient taking differently: TAKE 1 BY MOUTH EVERY MORNING)    sevelamer carbonate (RENVELA) 800 mg Tab Take 3 to 4 tablets by mouth twice daily as needed    vitamin renal formula, B-complex-vitamin c-folic acid, (B COMPLEX-C-FOLIC ACID) 1 mg Cap Take 1 capsule by mouth once daily. 1 Capsule Oral Every day     Antibiotics (From admission, onward)    None        Antifungals (From admission, onward)    None        Antivirals (From admission, onward)    None           Immunization History   Administered Date(s) Administered    Influenza - High Dose 10/08/2018    Pneumococcal Conjugate - 13 Valent 11/21/2016    Td - PF (ADULT) 05/20/2016       Family History     Problem Relation (Age of Onset)    Cancer Maternal Grandmother, Paternal Grandfather    Diabetes Maternal Aunt, Paternal Aunt        Social History      Socioeconomic History    Marital status: Single     Spouse name: None    Number of children: None    Years of education: None    Highest education level: None   Social Needs    Financial resource strain: None    Food insecurity - worry: None    Food insecurity - inability: None    Transportation needs - medical: None    Transportation needs - non-medical: None   Occupational History     Employer: [employer]   Tobacco Use    Smoking status: Never Smoker    Smokeless tobacco: Never Used   Substance and Sexual Activity    Alcohol use: No     Comment: Pt reports some social use of about 1-2 drinks about every six months.    Drug use: No    Sexual activity: Not Currently     Partners: Male     Birth control/protection: None   Other Topics Concern    None   Social History Narrative     [employer]    One child    History of blood transfusion in 2004    Potential donor ??? brother     Review of Systems   Constitutional: Positive for activity change, appetite change and fatigue. Negative for diaphoresis.   HENT: Negative for trouble swallowing.    Eyes: Negative for photophobia and pain.   Respiratory: Positive for shortness of breath. Negative for cough, choking and wheezing.    Gastrointestinal: Positive for abdominal pain, diarrhea and nausea. Negative for abdominal distention.   Genitourinary: Negative for dysuria and flank pain.     Objective:     Vital Signs (Most Recent):  Temp: 96.4 °F (35.8 °C) (03/12/19 1646)  Pulse: 76 (03/12/19 1646)  Resp: 17 (03/12/19 1646)  BP: 131/65 (03/12/19 1646)  SpO2: 98 % (03/12/19 1558) Vital Signs (24h Range):  Temp:  [96.2 °F (35.7 °C)-98.3 °F (36.8 °C)] 96.4 °F (35.8 °C)  Pulse:  [76-85] 76  Resp:  [14-18] 17  SpO2:  [93 %-98 %] 98 %  BP: (103-131)/(54-65) 131/65     Weight: 99 kg (218 lb 4.1 oz)  Body mass index is 34.18 kg/m².    Estimated Creatinine Clearance: 10.6 mL/min (A) (based on SCr of 7.8 mg/dL (H)).    Physical Exam    Constitutional: She is oriented to person, place, and time. She appears well-developed and well-nourished.   HENT:   Head: Normocephalic and atraumatic.   Eyes: EOM are normal. Pupils are equal, round, and reactive to light. No scleral icterus.   Neck: Normal range of motion. Neck supple.   Cardiovascular: Normal rate and regular rhythm.   Murmur heard.  Pulmonary/Chest: Effort normal and breath sounds normal. No respiratory distress. She has no wheezes. She has no rales.   Abdominal: Soft. Bowel sounds are normal. There is tenderness. There is no rebound.   + PD catheter, no erythema, no drainage   Musculoskeletal: She exhibits no edema or tenderness.   Lymphadenopathy:     She has no cervical adenopathy.   Neurological: She is alert and oriented to person, place, and time.   BL UE and BL LE no gross motor deficits   Skin: Skin is warm.   Dry skin noted on abdomen   Psychiatric:   Calm and cooperative   Nursing note and vitals reviewed.      Significant Labs:   CBC:   Recent Labs   Lab 03/11/19  0652 03/12/19  0654   WBC 8.82 9.18   HGB 8.2* 8.7*   HCT 26.5* 28.5*   * 381*     CMP:   Recent Labs   Lab 03/11/19  0652 03/12/19  0654   *  131* 130*  131*   K 3.7  3.8 3.5  3.6   CL 91*  91* 88*  89*   CO2 25  26 28  28   *  147* 141*  143*   BUN 32*  32* 30*  29*   CREATININE 8.0*  7.9* 7.8*  7.8*   CALCIUM 7.4*  7.3* 7.4*  7.4*   PROT 6.2 6.6   ALBUMIN 1.7*  1.7* 1.8*  1.8*   BILITOT 0.2 0.3   ALKPHOS 124 131   AST 13 16   ALT <5* <5*   ANIONGAP 14  14 14  14   EGFRNONAA 5*  5* 6*  6*     Microbiology Results (last 7 days)     Procedure Component Value Units Date/Time    Fungus culture [[id number]] Collected:  03/12/19 1431    Order Status:  Sent Specimen:  Body Fluid from Peritoneal Fluid Updated:  03/12/19 1432    Gram stain [[id number]] Collected:  03/12/19 1431    Order Status:  Sent Specimen:  Body Fluid from Peritoneal Fluid Updated:  03/12/19 1432    Aerobic culture  [[id number]] Collected:  03/12/19 1431    Order Status:  Sent Specimen:  Body Fluid from Peritoneal Fluid Updated:  03/12/19 1432    Aerobic culture [[id number]] Collected:  03/08/19 1729    Order Status:  Completed Specimen:  Body Fluid from Peritoneal Fluid Updated:  03/12/19 1014     Aerobic Bacterial Culture No growth    Culture, Anaerobe [[id number]] Collected:  03/08/19 1729    Order Status:  Completed Specimen:  Body Fluid from Peritoneal Fluid Updated:  03/12/19 0733     Anaerobic Culture Culture in progress    Blood culture [[id number]] Collected:  03/03/19 1625    Order Status:  Completed Specimen:  Blood Updated:  03/08/19 2012     Blood Culture, Routine No growth after 5 days.    Culture, Anaerobe [[id number]] Collected:  03/01/19 1542    Order Status:  Completed Specimen:  Body Fluid from Abdomen Updated:  03/08/19 0819     Anaerobic Culture No anaerobes isolated    Culture, Fluid  (Aerobic) [[id number]]  (Susceptibility) Collected:  03/01/19 1439    Order Status:  Completed Specimen:  Body Fluid from Ascites Updated:  03/07/19 1001     AEROBIC CULTURE - FLUID --     ENTEROBACTER CLOACAE  From broth only       AEROBIC CULTURE - FLUID --     STAPHYLOCOCCUS AUREUS  From broth only       Gram Stain Result Rare WBC's      No organisms seen    Fungus culture [[id number]] Collected:  03/01/19 1542    Order Status:  Completed Specimen:  Body Fluid from Abdomen Updated:  03/06/19 0857     Fungus (Mycology) Culture Culture in progress        All pertinent labs within the past 24 hours have been reviewed.    Significant Imaging: I have reviewed all pertinent imaging results/findings within the past 24 hours.

## 2019-03-12 NOTE — PLAN OF CARE
Problem: Adult Inpatient Plan of Care  Goal: Plan of Care Review  Outcome: Ongoing (interventions implemented as appropriate)  Pt safety maintained. Bed in low and locked position. PT NPO p MN for PD cath removal. Pt received pain and nausea medication throughout this shift. PD dialysis tonight. Will continue to monitor.

## 2019-03-13 PROBLEM — I42.9 CARDIOMYOPATHY: Status: ACTIVE | Noted: 2019-01-01

## 2019-03-13 NOTE — PROCEDURES
"Jenni Todd is a 48 y.o. female patient.    Temp: 96 °F (35.6 °C) (03/13/19 0727)  Pulse: (!) 132 (03/13/19 0859)  Resp: 20 (03/13/19 0859)  BP: 127/67 (03/13/19 0857)  SpO2: 100 % (03/13/19 0859)  Weight: 99.7 kg (219 lb 12.8 oz) (03/13/19 0710)  Height: 5' 7" (170.2 cm) (03/12/19 0108)       Central Line  Date/Time: 3/13/2019 11:24 AM  Performed by: Servando Solis MD  Consent Done: Yes  Time out: Immediately prior to procedure a "time out" was called to verify the correct patient, procedure, equipment, support staff and site/side marked as required.  Indications: hemodialysis and vascular access  Anesthesia: local infiltration    Anesthesia:  Local Anesthetic: lidocaine 1% without epinephrine  Preparation: skin prepped with ChloraPrep  Location details: left femoral  Site selection rationale: Unable to access IJ  Catheter type: triple lumen  Catheter size: 14 Fr  Catheter Length: 19cm    Ultrasound guidance: yes  Vessel Caliber: small, patent, compressibility normal  Needle advanced into vessel with real time Ultrasound guidance.  Guidewire confirmed in vessel.  Sterile sheath used.  Number of attempts: 2  Assessment: placement verified by x-ray  Complications: none  Specimens: No  Implants: No  Post-procedure: line sutured,  chlorhexidine patch,  sterile dressing applied and blood return through all ports  Complications: No  Comments: Attempted Right IJ. Able to access vein but could not advance wire after multiple attempts. Right femoral very small.          Servando Solis  3/13/2019  "

## 2019-03-13 NOTE — PLAN OF CARE
First unit of blood in progress, bp up110 sys hr 108 ,pt still drowsy but easily aroused, no co pain, can hear heart tones now, sounds like possible murmur present

## 2019-03-13 NOTE — PROGRESS NOTES
[facility]  Infectious Disease  Progress Note    Patient Name: Jenni Todd  MRN: [id number]  Admission Date: 2/21/2019  Length of Stay: 17 days  Attending Physician: Michael Tan III, MD  Primary Care Provider: Michael Tan Iii, MD    Isolation Status: No active isolations  Assessment/Plan:      Peritonitis due to infected peritoneal dialysis catheter     Ms. Jenni Todd is a 48 y.o. F w/ PMH of HTN, DM, PAD, Anemia of chronic disease 2/2 CKD, on PD since 2004 presenting to the ED from [facility] on 2/21/2019  for concerns of peritonitis. She has a h/o peritonitis several times in the past as per records. Patient was in her usual state of health until Sunday 2/17. Patient reported that she first had diarrhea and abdominal cramping. Patient had not been sleeping well and not feeling well since Sunday 2/17.. Patient also reported then diaphoresis, fatigue, subjective fevers and loss of appetite.     At [facility] dialysis [place] the PD fluid was cloudy and on 2/21 PD fluid sent for Cx from [facility] dialysis [place] and started on vancomycin and Ceftazidime as outpatient and sent to ED for admission b/o sickness. As per verbal report by nephrology team, the peritoneal fluid cx gre Rothia species and patient was continued on IV Vanco, peritoneal ceftazdime and then on 3/1 added Cefazolin peritoneal with cefazidime till 3/2. Ceftazidime stopped 3/3. Continued on Vancomycin still as of now at time of consult as per levels, intermittenly receiving IV Vanco. Last level 3/12 vanco level was 26.8.     Patient started to feel again abdominal tenderness, looked again sick and PD fluid cx sent again on 3/1/2019 that grew Enterobacter and MSSA. Pt was started on oxacillin via peritoneum and IV bactrim from 3/5/2019 and IV vancomycin was continued. WBC levels in peritoneal fluids were decreasing and decreased to 71 on 3/9 but now have started to increase again and patient again feeling abdominal  tenderness.Repeated fluid cell count 3/12/19 and is 1025 with 95% segs. Cx sent also.    Patient has finally consented and will go on HD for now. Planning to remove PD catheter on 3/13/2019 and placed catheter for HD for now.   ID consult called for further reccs for Antibiotics    Peritoneal Cx data  2/21 Rothia Species as per verbal report  3/1 Enterobacter and MSSA  3/8 Fluid Cx sent Aerobic NGTD. No AFB seen  3/12 Fluid cx sent for aerobic/fungal/AFB=NGTD      Other cx data  2/21 Blood cx no growth  3/1 Dwight cx for Cdiff PCR negative  3/3 Blood cx no growth  3/13 Blood cx sent    2/26 TTE showed decreased EF of 30%, no vegetations seen. In 2/2017 EF was 60-65%    ---3/13 Sent to ICU for GI bleed, acute blood loss anemia, to get 2 units PRBCs then EGD then when stable PD cathter removal. Placed Left femoral trialysis catheter    Currently on IV Vanco intermittently as per levels, Pip tazo added and Fluconazole. Off oxacillin and Bactrim  Recommendations  --- Seems like the Rothia species seen in 2/21 peritoneal fluid cx taken outpatient has been treated with ceftazidime/vanco and cefazolin  --- Started on Fluconazole and Pip-tazo for broader coverage today by primary team. On intermittent Vancomycin levels still high 22  --- Agree to continue Pip tazo that will cover Enterobacter in 3/1 fluid cx and Vancomycin that will cover MSSA. Fluid cx from 3/8 and 3/12 so far NGTD  --- Will need good source control, awaiting PD catheter to be removed when stable  --- We will follow the pending blood cx and peritoneal fluid cultures results with you    ID will follow         Anticipated Disposition: ID will follow    Thank you for your consult. I will follow-up with patient. Please contact us if you have any additional questions.    Peterson Charlton MD   ID Fellow  Infectious Disease  [facility]    Subjective:     Principal Problem:Peritonitis associated with peritoneal dialysis    HPI:     Interval  History: Overnight events noted, transferred to ICU, GI bleed, required IV access placed Left femoral IV    Review of Systems   Constitutional: Positive for activity change, appetite change and fatigue. Negative for diaphoresis.   HENT: Negative for trouble swallowing.    Eyes: Negative for photophobia and pain.   Respiratory: Positive for shortness of breath. Negative for cough, choking and wheezing.    Gastrointestinal: Positive for abdominal pain, blood in stool, diarrhea and nausea. Negative for abdominal distention.   Genitourinary: Negative for dysuria and flank pain.     Objective:     Vital Signs (Most Recent):  Temp: 98.8 °F (37.1 °C) (03/13/19 1345)  Pulse: 99 (03/13/19 1400)  Resp: 19 (03/13/19 1400)  BP: 112/70 (03/13/19 1400)  SpO2: 100 % (03/13/19 1400) Vital Signs (24h Range):  Temp:  [96 °F (35.6 °C)-98.8 °F (37.1 °C)] 98.8 °F (37.1 °C)  Pulse:  [] 99  Resp:  [8-23] 19  SpO2:  [92 %-100 %] 100 %  BP: ()/(41-70) 112/70  Arterial Line BP: (0-68)/(0-53) 0/0     Weight: 99.7 kg (219 lb 12.8 oz)  Body mass index is 34.43 kg/m².    Estimated Creatinine Clearance: 9.3 mL/min (A) (based on SCr of 8.9 mg/dL (H)).    Physical Exam   Constitutional: She is oriented to person, place, and time. She appears well-developed and well-nourished.   HENT:   Head: Normocephalic and atraumatic.   Eyes: EOM are normal. Pupils are equal, round, and reactive to light. No scleral icterus.   Neck: Normal range of motion. Neck supple.   Cardiovascular: Normal rate and regular rhythm.   Murmur heard.  Pulmonary/Chest: Effort normal and breath sounds normal. No respiratory distress. She has no wheezes. She has no rales.   Abdominal: Soft. Bowel sounds are normal. There is tenderness. There is no rebound.   + PD catheter, no erythema, no drainage. Left femoral trialysis catheter   Musculoskeletal: She exhibits no edema or tenderness.   Lymphadenopathy:     She has no cervical adenopathy.   Neurological: She is alert  and oriented to person, place, and time.   BL UE and BL LE no gross motor deficits   Skin: Skin is warm.   Dry skin noted on abdomen   Psychiatric:   Calm and cooperative   Nursing note and vitals reviewed.      Significant Labs:   CBC:   Recent Labs   Lab 03/12/19  0654 03/13/19  0651 03/13/19  0844   WBC 9.18 11.20  --    HGB 8.7* 6.0* 6.0*   HCT 28.5* 19.9* 20.1*   * 402*  --      CMP:   Recent Labs   Lab 03/12/19  0654 03/13/19  0651 03/13/19  0918   *  131* 129*  129* 129*   K 3.5  3.6 4.0  3.9 4.3   CL 88*  89* 88*  88* 89*   CO2 28  28 24  24 22*   *  143* 135*  134* 160*   BUN 30*  29* 51*  50* 51*   CREATININE 7.8*  7.8* 8.7*  8.7* 8.9*   CALCIUM 7.4*  7.4* 6.8*  6.8* 7.0*   PROT 6.6 5.3* 5.4*   ALBUMIN 1.8*  1.8* 1.6*  1.5* 1.6*   BILITOT 0.3 0.2 0.2   ALKPHOS 131 98 101   AST 16 13 15   ALT <5* <5* <5*   ANIONGAP 14  14 17*  17* 18*   EGFRNONAA 6*  6* 5*  5* 5*     Microbiology Results (last 7 days)     Procedure Component Value Units Date/Time    Blood culture [[id number]] Collected:  03/13/19 1203    Order Status:  Sent Specimen:  Blood Updated:  03/13/19 1203    Narrative:       Collection has been rescheduled by [facility] at 03/13/2019 10:00 Reason:   doctors & nurses are trying to start a line  Collection has been rescheduled by [facility] at 03/13/2019 11:03 Reason:   the nurse Gilmer will call if they need lab to draw  Collection has been rescheduled by [facility] at 03/13/2019 10:00 Reason:   doctors & nurses are trying to start a line  Collection has been rescheduled by [facility] at 03/13/2019 11:03 Reason:   the nurse Scherer will call if they need lab to draw    Blood culture [[id number]] Collected:  03/13/19 1152    Order Status:  Sent Specimen:  Blood Updated:  03/13/19 1153    Narrative:       Collection has been rescheduled by [facility] at 03/13/2019 10:00 Reason:   doctors & nurses are trying to start a line  Collection has been rescheduled by [facility] at 03/13/2019 11:03 Reason:   the  nurse Gilmer will call if they need lab to draw  Collection has been rescheduled by [facility] at 03/13/2019 10:00 Reason:   doctors & nurses are trying to start a line  Collection has been rescheduled by [facility] at 03/13/2019 11:03 Reason:   the nurse Gilmer will call if they need lab to draw    Aerobic culture [[id number]] Collected:  03/12/19 1431    Order Status:  Completed Specimen:  Body Fluid from Peritoneal Fluid Updated:  03/13/19 0730     Aerobic Bacterial Culture No growth    Gram stain [[id number]] Collected:  03/12/19 1431    Order Status:  Completed Specimen:  Body Fluid from Peritoneal Fluid Updated:  03/12/19 2214     Gram Stain Result Rare WBC's      No organisms seen    Fungus culture [[id number]] Collected:  03/12/19 1431    Order Status:  Sent Specimen:  Body Fluid from Peritoneal Fluid Updated:  03/12/19 1942    Aerobic culture [[id number]] Collected:  03/08/19 1729    Order Status:  Completed Specimen:  Body Fluid from Peritoneal Fluid Updated:  03/12/19 1014     Aerobic Bacterial Culture No growth    Culture, Anaerobe [[id number]] Collected:  03/08/19 1729    Order Status:  Completed Specimen:  Body Fluid from Peritoneal Fluid Updated:  03/12/19 0733     Anaerobic Culture Culture in progress    Blood culture [[id number]] Collected:  03/03/19 1625    Order Status:  Completed Specimen:  Blood Updated:  03/08/19 2012     Blood Culture, Routine No growth after 5 days.    Culture, Anaerobe [[id number]] Collected:  03/01/19 1542    Order Status:  Completed Specimen:  Body Fluid from Abdomen Updated:  03/08/19 0819     Anaerobic Culture No anaerobes isolated    Culture, Fluid  (Aerobic) [[id number]]  (Susceptibility) Collected:  03/01/19 1439    Order Status:  Completed Specimen:  Body Fluid from Ascites Updated:  03/07/19 1001     AEROBIC CULTURE - FLUID --     ENTEROBACTER CLOACAE  From broth only       AEROBIC CULTURE - FLUID --     STAPHYLOCOCCUS AUREUS  From broth only       Gram Stain Result Rare WBC's      No  organisms seen        Recent Lab Results       03/13/19  1449   03/13/19  0939   03/13/19  0918   03/13/19  0858   03/13/19  0844        Unit Blood Type Code               Unit Expiration               Unit Blood Type               Albumin     1.6         Alkaline Phosphatase     101         Allens Test   Pass           ALT     <5         Anion Gap     18         AST     15         Baso #               Basophil%               Total Bilirubin     0.2  Comment:  For infants and newborns, interpretation of results should be based  on gestational age, weight and in agreement with clinical  observations.  Premature Infant recommended reference ranges:  Up to 24 hours.............<8.0 mg/dL  Up to 48 hours............<12.0 mg/dL  3-5 days..................<15.0 mg/dL  6-29 days.................<15.0 mg/dL           BNP     459  Comment:  Values of less than 100 pg/ml are consistent with non-CHF populations.         Site   LR           BUN, Bld     51         Calcium     7.0         Chloride     89         CO2     22         CODING SYSTEM               Creatinine     8.9         Differential Method               DISPENSE STATUS               eGFR if      5         eGFR if non      5  Comment:  Calculation used to obtain the estimated glomerular filtration  rate (eGFR) is the CKD-EPI equation.            Eos #               Eosinophil%               Glucose     160         Gran # (ANC)               Gran%               Group & Rh               Hematocrit         20.1     Hemoglobin         6.0     INDIRECT KYLEIGH               Coumadin Monitoring INR               Lactate, Gerald     7.0  Comment:  Falsely low lactic acid results can be found in samples   containing >=13.0 mg/dL total bilirubin and/or >=3.5 mg/dL   direct bilirubin.  la  critical result(s) called and verbal readback obtained from   ray franklin, 03/13/2019 09:40           Lymph #               Lymph%               Magnesium                MCH               MCHC               MCV               Mono #               Mono%               MPV               Phosphorus               Platelets               POC BE   -3           POC HCO3   22.9           POC PCO2   44.5           POC PH   7.319           POC PO2   17           POC SATURATED O2   21           POC TCO2   24           POCT Glucose 121     184       Potassium     4.3         PRODUCT CODE               Total Protein     5.4         Protime               RBC               RDW               Sample   VENOUS           Sodium     129         Troponin I     0.122  Comment:  The reference interval for Troponin I represents the 99th percentile   cutoff   for our facility and is consistent with 3rd generation assay   performance.           UNIT NUMBER               Vancomycin, Random               WBC                                03/13/19  0651   03/13/19  0611   03/13/19  0545   03/13/19  0452   03/12/19 2026        Unit Blood Type Code 1700[P] 1700            7300[P]              7300[P]              7300[P]             Unit Expiration [id number][P] [id number][P]              [id number][P]              [id number][P]             Unit Blood Type B NEG[P] B NEG            B POS[P]              B POS[P]              B POS[P]             Albumin 1.6              1.5             Alkaline Phosphatase 98             Allens Test               ALT <5             Anion Gap 17              17             AST 13             Baso # 0.01             Basophil% 0.1             Total Bilirubin 0.2  Comment:  For infants and newborns, interpretation of results should be based  on gestational age, weight and in agreement with clinical  observations.  Premature Infant recommended reference ranges:  Up to 24 hours.............<8.0 mg/dL  Up to 48 hours............<12.0 mg/dL  3-5 days..................<15.0 mg/dL  6-29 days.................<15.0 mg/dL               BNP                Site               BUN, Bld 51              50             Calcium 6.8  Comment:  ca   critical result(s) called and verbal readback obtained from    zahng rn, 03/13/2019 07:52                6.8  Comment:  ca   critical result(s) called and verbal readback obtained from    zahng rn , 03/13/2019 07:52               Chloride 88              88             CO2 24              24             CODING SYSTEM BSCO708[P] [id number]            UXBB740[P]              FIMI104[P]              WROI951[P]             Creatinine 8.7              8.7             Differential Method Automated             DISPENSE STATUS ISSUED[P] RETURNED            ISSUED[P]              CROSSMATCHED[P]              CROSSMATCHED[P]             eGFR if  6              6             eGFR if non  5  Comment:  Calculation used to obtain the estimated glomerular filtration  rate (eGFR) is the CKD-EPI equation.                 5  Comment:  Calculation used to obtain the estimated glomerular filtration  rate (eGFR) is the CKD-EPI equation.                Eos # 0.0             Eosinophil% 0.1             Glucose 135              134             Gran # (ANC) 9.7             Gran% 86.9             Group & Rh B POS             Hematocrit 19.9  Comment:  HCT  critical result(s) called and verbal readback obtained from   Marleny Hebert RN, 03/13/2019 07:56               Hemoglobin 6.0             INDIRECT KYLEIGH NEG             Coumadin Monitoring INR 1.7  Comment:  Coumadin Therapy:  2.0 - 3.0 for INR for all indicators except mechanical heart valves  and antiphospholipid syndromes which should use 2.5 - 3.5.               Lactate, Gerald               Lymph # 1.2             Lymph% 10.5             Magnesium 1.6             MCH 29.6             MCHC 30.2             MCV 98             Mono # 0.3             Mono% 2.2             MPV 9.1             Phosphorus 5.8              5.8             Platelets 402             POC BE                POC HCO3               POC PCO2               POC PH               POC PO2               POC SATURATED O2               POC TCO2               POCT Glucose     148 135 110     Potassium 4.0              3.9             PRODUCT CODE F6043J42[P] P9896K86            V9539S63[P]              Q7023N65[P]              [id number][P]             Total Protein 5.3             Protime 17.9             RBC 2.03             RDW 17.8             Sample               Sodium 129              129             Troponin I               UNIT NUMBER [id number][P] [id number]            [id number][P]              [id number][P]              [id number][P]             Vancomycin, Random 22.9             WBC 11.20                              03/12/19  1615        Unit Blood Type Code       Unit Expiration       Unit Blood Type       Albumin       Alkaline Phosphatase       Allens Test       ALT       Anion Gap       AST       Baso #       Basophil%       Total Bilirubin       BNP       Site       BUN, Bld       Calcium       Chloride       CO2       CODING SYSTEM       Creatinine       Differential Method       DISPENSE STATUS       eGFR if        eGFR if non        Eos #       Eosinophil%       Glucose       Gran # (ANC)       Gran%       Group & Rh       Hematocrit       Hemoglobin       INDIRECT KYLEIGH       Coumadin Monitoring INR       Lactate, Gerald       Lymph #       Lymph%       Magnesium       MCH       MCHC       MCV       Mono #       Mono%       MPV       Phosphorus       Platelets       POC BE       POC HCO3       POC PCO2       POC PH       POC PO2       POC SATURATED O2       POC TCO2       POCT Glucose 150     Potassium       PRODUCT CODE       Total Protein       Protime       RBC       RDW       Sample       Sodium       Troponin I       UNIT NUMBER       Vancomycin, Random       WBC             Significant Imaging: I have reviewed all pertinent imaging results/findings within  the past 24 hours.

## 2019-03-13 NOTE — PLAN OF CARE
Dr Solis at bedside, will place tri alysis catheter, Dr Lemus also at bedside, spoke with med team, and Dr Juárez that PD catheter needs to be out, pt for egd today, pt arouses easily, but falls back to sleep, pulse 90's st ,bp up 103 sys, hob down and feet up

## 2019-03-13 NOTE — SIGNIFICANT EVENT
MET called for patient at approximately 6 am. Nursing reported seizure like activity. Upon arrival to the room, patient appeared anxious. EKG showed Sinus tachycardia. BP stable. Hr 127. BP 90/60s. Nursing unable to obtain peripheral line on patient. Anesthesia Consulted. Morning labs ordered verbally STAT. Type and Screen and Prep 1 unit PRBC ordered. Will give 500 CC bolus and 1mg of ativan for anxiety. Will reassess this AM.    Hayden Padilla MD  [employer] [facility], PGY-1

## 2019-03-13 NOTE — PLAN OF CARE
Return to room, drowsy, but arouseable, denies pain, nausea, could not see in stomach due to too much old blood and clots, blood just infused, saline in progress, cbc and lactic acid pending past blood

## 2019-03-13 NOTE — PLAN OF CARE
MET called this am    Pt transferred to ICU from 4th floor    Progress notes reviewed:  HPI: Ms. Jenni Todd is a 48 y.o. female w/ PMH of HTN, DM, PAD, AoCD 2/2 CKD, on peritoneal dialysis since 2004 presenting to the ED from [facility] for concerns of peritonitis.  Per am nurse she didn't want to wear tele monitor am. Per residents both day and night, the patient has been refusing both oral and iv PPI and  H2 in the past few days. A MET was called when L-GI saw her, both MET and FM team responded to the MET. The patient seems a little confuse and still answering questions appropriately    Pt now with UGI bleed-  -GI consulted  Prepared for 3 ui, will give leukoreduced  Plan is to scope her after 1st transfusion if patient responds appropriately.   If patient not responding appropriately will CTA chest, and CT abd.    Peritonitis 2/2 Peritoneal Dialysis  General surgery c/s and recommend removal and PD catheter and placement of permacath.  Will plan for OR tomorrow and NPO at midnight.    Pt's sister at bedside and MD updated pt/sister on plan.      Tn to follow for needs.       03/13/19 1148   Discharge Reassessment   Assessment Type Discharge Planning Reassessment   Provided patient/caregiver education on the expected discharge date and the discharge plan Yes   Do you have any problems affording any of your prescribed medications? No   Discharge Plan A Home with family   Discharge Plan B Home with family;Home Health   DME Needed Upon Discharge  (tbd)   Patient choice form signed by patient/caregiver N/A   Anticipated Discharge Disposition Home   Can the patient answer the patient profile reliably? Yes, cognitively intact   How does the patient rate their overall health at the present time? Fair   Describe the patient's ability to walk at the present time. Major restrictions/daily assistance from another person

## 2019-03-13 NOTE — CONSULTS
[facility] Cardiology Consult - Fellow Note    Consultant Attending: Wai  Consultant Fellow: Keenan Mart     Reason for Consult:     Septic shock with known sCHF    Subjective:      History of Present Illness:  Jenni Todd is a 48 y.o. female who  has a past medical history of Abnormal finding on Pap smear, HPV DNA positive (2014), Anemia associated with chronic renal failure, Blood type B+, Bulging discs - symptomatic , CAD (coronary artery disease), Diabetes mellitus, type 2, ESRD (end stage renal disease) (04/20/2004), FSGS (focal segmental glomerulosclerosis), Hyperlipidemia, Hypertension (2004), Neuropathy, Obesity, Secondary hyperparathyroidism, renal, TIA (transient ischemic attack), and Uterine fibroid.. The patient presented to the [facility] on 2/21/2019 with a primary complaint of Vomiting (48 year old female presents to ed cc of abdominal pain with diarrhea that began on sunday and emesis today. patient is pd patient last pd exchange was yesterday full treamtent took antibiotic pd treatment today. )    Pt is being treated for peritonitis from PD site. She is now being treated for septic and hypovolemic shock 2/2 upper GI bleed. She had hematemesis overnight, her BP dropped to 90/40s which responded to 250ml bolus of LR. Her Hb dropped over 2 points since yesterday to Hb 6. She is getting 2 units PRBCs today then the plan is EGD this afternoon. She denies CP, SOB, palpitations.    Past Medical History:  Past Medical History:   Diagnosis Date    Abnormal finding on Pap smear, HPV DNA positive 2014    Anemia associated with chronic renal failure     on Epogen    Blood type B+     Bulging discs - symptomatic      CAD (coronary artery disease)     Diabetes mellitus, type 2     ESRD (end stage renal disease) 04/20/2004    FSGS (focal segmental glomerulosclerosis)     with collapsing    Hyperlipidemia     Hypertension 2004    Neuropathy     Obesity     Secondary  hyperparathyroidism, renal     TIA (transient ischemic attack)     Uterine fibroid     small uterine        Past Surgical History:  Past Surgical History:   Procedure Laterality Date    CARDIAC CATHETERIZATION      PCI x 2     SECTION, CLASSIC      COLONOSCOPY N/A 2018    Performed by Rodrigue Russell [facility] ENDO (2ND FLR)    DEBRIDEMENT-WOUND Left 2016    Performed by [facility]ressa Maddox MD at [facility] OR    DIALYSIS FISTULA CREATION      multiple fistulas and grafts before PD     INCISION AND DRAINAGE (I&D), LABIAL N/A 2016    Performed by Harmony Fernandez MD at [facility] OR    INCISION AND DRAINAGE (I&D), LABIAL (ADD ON) Left 2016    Performed by Teressa Maddox MD at [facility] OR    INCISION AND DRAINAGE OF WOUND Left     VULVAR ABCESS WITH NECROSIS    ORIF, HIP Left 2018    Performed by Tevin Grullon MD at [facility] OR    PERITONEAL CATHETER INSERTION      PERMCATH REWIRE- TUNNELED CATH REWIRE Left 2017    Performed by Baldev Munroe MD at [facility] CATH LAB    PERMCATH REWIRE- TUNNELED CATH REWIRE N/A 10/5/2017    Performed by Baldev Munroe MD at [facility] CATH LAB    UMBILICAL HERNIA REPAIR         Allergies:  Review of patient's allergies indicates:   Allergen Reactions    Clindamycin Diarrhea    Flagyl [metronidazole hcl]     Metronidazole        Medications:   In-Hospital Scheduled Medications:   atorvastatin  40 mg Oral QHS    calcitRIOL  0.5 mcg Oral QAM    cinacalcet  90 mg Oral QHS    epoetin adonay (PROCRIT) injection  20,000 Units Subcutaneous Every Mon, Wed, Fri    fluconazole (DIFLUCAN) IVPB  200 mg Intravenous Q24H    Lactobacillus acidoph-L.bulgar  4 tablet Oral TID WM    lorazepam  1 mg Intravenous Once    pantoprazole  40 mg Intravenous BID    piperacillin-tazobactam 4.5 g in dextrose 5 % 100 mL IVPB (ready to mix system)  4.5 g Intravenous Q12H    potassium chloride  20 mEq Oral Daily    prochlorperazine  10 mg Intravenous Q8H    ramelteon   8 mg Oral QHS    sevelamer carbonate  800 mg Oral TID WM    sodium bicarbonate  1,300 mg Oral BID    sucralfate  1 g Oral QID (AC & HS)    [START ON 3/14/2019] vancomycin (VANCOCIN) IVPB  750 mg Intravenous Q48H    vitamin renal formula (B-complex-vitamin c-folic acid)  1 capsule Oral Daily      In-Hospital PRN Medications:  sodium chloride, sodium chloride, aluminum-magnesium hydroxide-simethicone, dextrose 50%, dextrose 50%, glucagon (human recombinant), glucose, glucose, heparin (porcine), HYDROmorphone, insulin aspart U-100, ondansetron, promethazine, simethicone, sodium chloride 0.9%   In-Hospital IV Infusion Medications:     Home Medications:  Prior to Admission medications    Medication Sig Start Date End Date Taking? Authorizing Provider   amLODIPine (NORVASC) 5 MG tablet Take 1 tablet (5 mg total) by mouth once daily. 9/23/18 9/23/19 Yes Samara Minor MD   aspirin (ECOTRIN) 81 MG EC tablet Take 1 tablet (81 mg total) by mouth once daily. 1/16/17  Yes Calos Yousif MD   atorvastatin (LIPITOR) 40 MG tablet Take 40 mg by mouth every evening.    Yes Historical Provider, MD   calcitRIOL (ROCALTROL) 0.5 MCG Cap Take 1 capsule by mouth every morning.    Yes Historical Provider, MD   carvedilol (COREG) 3.125 MG tablet Take 1 tablet (3.125 mg total) by mouth 2 (two) times daily. 9/22/18 9/22/19 Yes Samara Minor MD   cinacalcet (SENSIPAR) 90 MG Tab Take 1 tablet by mouth every evening.    Yes Historical Provider, MD   clopidogrel (PLAVIX) 75 mg tablet Take 1 tablet (75 mg total) by mouth once daily. 1/30/19 1/30/20 Yes Gurvinder Watt MD   epoetin adonay 10,000 unit/mL Soln 1 mL, epoetin adonay 20,000 unit/mL Soln 1 mL Inject 30,000 Units into the vein every 14 (fourteen) days.   Yes Historical Provider, MD   gabapentin (NEURONTIN) 100 MG capsule 1 capsule 3 (three) times daily. 6/6/18  Yes Historical Provider, MD   heparin sodium,porcine (HEPARIN, PORCINE,) 5,000 unit/mL injection Inject 1.6 mLs (8,000 Units  total) into the skin every 12 (twelve) hours. 18  Yes Nancy Purcell MD   HUMULIN R REGULAR U-100 INSULN 100 unit/mL injection Inject 200 units into dialysis bag every evening. 18  Yes Historical Provider, MD   nateglinide (STARLIX) 120 MG tablet STARLIX 120MG 30 MINUTES BEFORE EACH MEAL.   Yes Historical Provider, MD   ONETOUCH VERIO Strp USE 1 STRIP ONCE DAILY IN VITRO 17  Yes Historical Provider, MD   PLAVIX 75 mg tablet TAKE 1 BY MOUTH DAILY  Patient taking differently: TAKE 1 BY MOUTH EVERY MORNING 17  Yes Michael Tan III, MD   sevelamer carbonate (RENVELA) 800 mg Tab Take 3 to 4 tablets by mouth twice daily as needed 3/6/18  Yes Historical Provider, MD   vitamin renal formula, B-complex-vitamin c-folic acid, (B COMPLEX-C-FOLIC ACID) 1 mg Cap Take 1 capsule by mouth once daily. 1 Capsule Oral Every day   Yes Historical Provider, MD   gabapentin (NEURONTIN) 300 MG capsule TAKE 3 CAPSULES (900 MG TOTAL) BY MOUTH 3 (THREE) TIMES A DAY. 3/1/19   Jewel Saenz MD       Family History:  Family History   Problem Relation Age of Onset    Cancer Maternal Grandmother     Cancer Paternal Grandfather     Diabetes Maternal Aunt     Diabetes Paternal Aunt     Kidney disease Neg Hx     Heart disease Neg Hx     Ovarian cancer Neg Hx     Breast cancer Neg Hx        Social History:  Social History     Tobacco Use    Smoking status: Never Smoker    Smokeless tobacco: Never Used   Substance Use Topics    Alcohol use: No     Comment: Pt reports some social use of about 1-2 drinks about every six months.    Drug use: No       Review of Systems:  Pertinent items are noted in HPI. All other systems are reviewed and are negative.     Objective:   Last 24 Hour Vital Signs:  BP  Min: 91/55  Max: 131/65  Temp  Av.6 °F (35.9 °C)  Min: 96 °F (35.6 °C)  Max: 98.6 °F (37 °C)  Pulse  Av.9  Min: 76  Max: 135  Resp  Av  Min: 11  Max: 20  SpO2  Av.4 %  Min: 92 %  Max: 100 %  Weight  Avg:  "99.7 kg (219 lb 12.8 oz)  Min: 99.7 kg (219 lb 12.8 oz)  Max: 99.7 kg (219 lb 12.8 oz)  I/O last 3 completed shifts:  In: 233 [P.O.:220; I.V.:13]  Out: 2461 [Emesis/NG output:1775; Other:436; Stool:250]  Body mass index is 34.43 kg/m².    Physical Examination:    BP (!) 111/49   Pulse 100   Temp 98.6 °F (37 °C) (Oral)   Resp 11   Ht 5' 7" (1.702 m)   Wt 99.7 kg (219 lb 12.8 oz)   LMP 01/28/2014 (Approximate)   SpO2 100%   Breastfeeding? No   BMI 34.43 kg/m²     General Appearance:    Alert, cooperative, no distress, appears stated age   Head:    Normocephalic, without obvious abnormality, atraumatic   Eyes:    PERRL, conjunctiva/corneas clear, EOM's intact, fundi     benign, both eyes   Ears:    Normal TM's and external ear canals, both ears   Nose:   Nares normal, septum midline, mucosa normal, no drainage    or sinus tenderness   Throat:   Lips, mucosa, and tongue normal; teeth and gums normal   Neck:   Supple, symmetrical, trachea midline, no adenopathy;     thyroid:  no enlargement/tenderness/nodules; no carotid    bruit or JVD   Back:     Symmetric, no curvature, ROM normal, no CVA tenderness   Lungs:     Clear to auscultation bilaterally, respirations unlabored   Chest Wall:    No tenderness or deformity    Heart:    Regular rate and rhythm, S1 and S2 normal, no murmur, rub   or gallop   Breast Exam:    No tenderness, masses, or nipple abnormality   Abdomen:     Soft, non-tender, bowel sounds active all four quadrants,     no masses, no organomegaly   Genitalia:    Normal female without lesion, discharge or tenderness   Rectal:    Normal tone, no masses or tenderness;    guaiac negative stool   Extremities:   Extremities normal, atraumatic, no cyanosis or edema   Pulses:   2+ and symmetric all extremities   Skin:   Skin color, texture, turgor normal, no rashes or lesions   Lymph nodes:   Cervical, supraclavicular, and axillary nodes normal   Neurologic:   CNII-XII intact, normal strength, sensation " and reflexes     throughout         Laboratory Results:  Most Recent Data:  CBC:   Lab Results   Component Value Date    WBC 11.20 03/13/2019    HGB 6.0 (L) 03/13/2019    HCT 20.1 (L) 03/13/2019     (H) 03/13/2019    MCV 98 03/13/2019    RDW 17.8 (H) 03/13/2019     BMP:   Lab Results   Component Value Date     (L) 03/13/2019    K 4.3 03/13/2019    CL 89 (L) 03/13/2019    CO2 22 (L) 03/13/2019    BUN 51 (H) 03/13/2019    CREATININE 8.9 (H) 03/13/2019     (H) 03/13/2019    CALCIUM 7.0 (L) 03/13/2019    MG 1.6 03/13/2019    PHOS 5.8 (H) 03/13/2019    PHOS 5.8 (H) 03/13/2019     LFTs:   Lab Results   Component Value Date    PROT 5.4 (L) 03/13/2019    ALBUMIN 1.6 (L) 03/13/2019    BILITOT 0.2 03/13/2019    AST 15 03/13/2019    ALKPHOS 101 03/13/2019    ALT <5 (L) 03/13/2019     Coags:   Lab Results   Component Value Date    INR 1.7 (H) 03/13/2019     FLP:   Lab Results   Component Value Date    CHOL 199 02/24/2017    HDL 19 (L) 02/24/2017    LDLCALC Invalid, Trig>400.0 02/24/2017    TRIG 460 (H) 02/24/2017    CHOLHDL 9.5 (L) 02/24/2017     DM:   Lab Results   Component Value Date    HGBA1C 6.3 (H) 09/12/2018    HGBA1C 7.9 (H) 11/30/2017    HGBA1C 9.0 (H) 02/22/2017    LDLCALC Invalid, Trig>400.0 02/24/2017    CREATININE 8.9 (H) 03/13/2019     Thyroid:   Lab Results   Component Value Date    TSH 2.212 02/24/2017    FREET4 1.03 07/06/2015     Anemia:   Lab Results   Component Value Date    IRON 19 (L) 09/15/2018    TIBC 182 (L) 09/15/2018    FERRITIN 1,920 (H) 04/16/2016    AJYHHZCW09 1017 (H) 02/24/2017    FOLATE 13.3 02/24/2017     Cardiac:   Lab Results   Component Value Date    TROPONINI 0.122 (H) 03/13/2019     (H) 03/13/2019     Urinalysis: No results found for: LABURIN, COLORU, CLARITYU, SPECGRAV, LABSPEC, NITRITE, PROTEINUR, GLUCOSEU, KETONESU, UROBILINOGEN, BILIRUBINUR, BLOODU    Trended Lab Data:  Recent Labs   Lab 03/11/19  0652 03/12/19  0654 03/13/19  0651 03/13/19  0844  03/13/19  0918   WBC 8.82 9.18 11.20  --   --    HGB 8.2* 8.7* 6.0* 6.0*  --    HCT 26.5* 28.5* 19.9* 20.1*  --    * 381* 402*  --   --    MCV 97 98 98  --   --    RDW 17.5* 17.4* 17.8*  --   --    *  131* 130*  131* 129*  129*  --  129*   K 3.7  3.8 3.5  3.6 4.0  3.9  --  4.3   CL 91*  91* 88*  89* 88*  88*  --  89*   CO2 25  26 28  28 24  24  --  22*   BUN 32*  32* 30*  29* 51*  50*  --  51*   CREATININE 8.0*  7.9* 7.8*  7.8* 8.7*  8.7*  --  8.9*   *  147* 141*  143* 135*  134*  --  160*   PROT 6.2 6.6 5.3*  --  5.4*   ALBUMIN 1.7*  1.7* 1.8*  1.8* 1.6*  1.5*  --  1.6*   BILITOT 0.2 0.3 0.2  --  0.2   AST 13 16 13  --  15   ALKPHOS 124 131 98  --  101   ALT <5* <5* <5*  --  <5*       Trended Cardiac Data:  Recent Labs   Lab 03/13/19  0918   TROPONINI 0.122*   *       Other Results:  EKG (my interpretation): sinus tachycardia, TWI in the lateral leads c/w ischemia    Radiology:  Ct Abdomen Without Contrast    Result Date: 3/3/2019  EXAMINATION: CT ABDOMEN WITHOUT CONTRAST CLINICAL HISTORY: peritonitis; TECHNIQUE: 5 mm axial images were obtained through the abdomen without the use of intravenous contrast. COMPARISON: CTA abdomen and pelvis dated 09/20/2018 FINDINGS: Limited images through the lower chest demonstrate streaky bibasilar opacities, likely atelectasis.  There is partially visualized coronary calcific atherosclerosis.  Stable right diaphragmatic calcification.  There is a 1.1 cm nodular density within the outer left breast (series 2, image 9). Abdomen and pelvis: The liver, spleen, pancreas, and adrenal glands have a normal noncontrast appearance.  The gallbladder is decompressed.  Bilateral native kidneys are atrophic with worsening perinephric stranding and fullness of the right lower pole (for example series 601, image 91).  Low-attenuation lesions within the left kidney, possible cysts though technically indeterminate.  There is extensive  abdominovisceral and peritoneal calcification with moderate volume ascites noting partially visualized peritoneal dialysis catheter.  Visualized GI tract is normal in caliber.  Similar, prominent retroperitoneal nodes, nonspecific and possibly reactive.  Lower pelvic structures were not imaged.  No aggressive osseous lesion.     Increased perinephric stranding and soft tissue fullness of the right renal lower pole, possibly infectious/inflammatory though underlying neoplasm not excluded.  Contrast enhanced exam recommended.  If unable to obtain, dedicated ultrasound suggested. Nodular density in the left outer breast.  Correlate with mammographic history. Extensive abdominovisceral and peritoneal calcification, similar. Intra-abdominal ascites with peritoneal dialysis catheter. This report was flagged in Epic as abnormal. Electronically signed by: Fantasma Kemp Date:    03/03/2019 Time:    17:45    X-ray Chest 1 View    Result Date: 3/13/2019  EXAMINATION: XR CHEST 1 VIEW CLINICAL HISTORY: attempted line placement; TECHNIQUE: Single frontal view of the chest was performed. COMPARISON: None FINDINGS: Mild cardiomegaly.  The lungs are clear.  No pleural effusion     See above Electronically signed by: Heraclio Timmons MD Date:    03/13/2019 Time:    12:03    X-ray Chest 1 View    Result Date: 3/13/2019  EXAMINATION: XR CHEST 1 VIEW CLINICAL HISTORY: sob; TECHNIQUE: Single frontal view of the chest was performed. COMPARISON: Chest radiograph from 12/01/2017 FINDINGS: No cardiomegaly.  Atherosclerosis of the aortic arch.  Normal pulmonary vasculature.  Minimal subsegmental atelectasis of the lower lung zones bilaterally.  Lungs are otherwise clear.  No pleural effusion.  No pneumothorax.  Osseous structures are unremarkable.     No acute abnormality. Electronically signed by: Jewel Powell MD Date:    03/13/2019 Time:    10:40    X-ray Abdomen Ap 1 View    Result Date: 3/13/2019  EXAMINATION: XR ABDOMEN AP 1 VIEW  CLINICAL HISTORY: (L) femoral line placement; TECHNIQUE: AP View(s) of the abdomen was performed. COMPARISON: Plain film from 03/10/2019 FINDINGS: Left-sided femoral line catheter is visualized with its tip in the region of the left common iliac vein.  No dilated loops of small bowel.  Osseous structures are unremarkable.     As above. Electronically signed by: Jewel Powell MD Date:    03/13/2019 Time:    12:11    X-ray Abdomen Portable    Result Date: 3/11/2019  EXAMINATION: ABDOMEN PORTABLE CLINICAL HISTORY: vomiting; TECHNIQUE: Abdomen AP portable COMPARISON: 02/22/2019 FINDINGS: Three 3 screws are seen through the left femoral neck.  There is a right sided catheter for dialysis.  Single AP view of the abdomen  demonstrates a nonspecific bowel gas pattern. No dilated loops small bowel or air-fluid levels are detected. There is a prominent loop of a loop of air-filled transverse colon.  Advanced vascular calcifications are present.  Bones are diffusely osteopenic.     Nonobstructive gas.  Bowel gas pattern. Electronically signed by: Salma Neely Date:    03/11/2019 Time:    02:52    Us Kidney    Result Date: 3/4/2019  EXAMINATION: US KIDNEY CLINICAL HISTORY: right renal mass? peritonitis; End stage renal disease TECHNIQUE: Ultrasound of the kidneys was performed including color flow and Doppler evaluation of the kidneys. COMPARISON: None. FINDINGS: Right kidney: Small measuring 9 x 4 x 4 cm and demonstrates cortical thinning and increased cortical echogenicity with loss of corticomedullary differentiation.  No identifiable perfusion.  No discrete masses.  No definite hydronephrosis. Left kidney:  Small measuring 7 x 3 x 4 cm and demonstrates cortical thinning and increased cortical echogenicity with loss of corticomedullary differentiation. No identifiable perfusion. No discrete masses. No definite hydronephrosis.     Sonographic findings in keeping with the patient's known history of ESRD. Electronically  signed by: Mauricio Trinidad MD Date:    03/04/2019 Time:    16:16    X-ray Kub    Result Date: 2/23/2019  EXAMINATION: XR KUB CLINICAL HISTORY: peritonitis;End stage renal disease TECHNIQUE: Single AP supine view of the abdomen (KUB) was performed COMPARISON: None FINDINGS: There is some bowel gas likely in the transverse colon.  Remainder the abdomen is essentially gasless.  Significant vascular calcifications noted.  Postop change of left hip and degenerative changes at both hips.  Some air in the descending colon.     Nonobstructive bowel gas pattern.  There are extensive vascular calcifications.  Abdomen is somewhat opaque which may represent ascites.  Correlation with clinical findings would be helpful. Electronically signed by: Adolph Cooper MD Date:    02/23/2019 Time:    07:37     Assessment:     Jenni Todd is a 48 y.o. female with:  Patient Active Problem List    Diagnosis Date Noted    Nausea and vomiting     Hypotension due to hypovolemia     Occult GI bleeding 03/01/2019    Abdominal pain     Peritonitis associated with peritoneal dialysis 02/21/2019    Peritonitis due to infected peritoneal dialysis catheter 02/21/2019    PAD (peripheral artery disease) 10/24/2016    Gait abnormality 12/23/2015    Chronic low back pain 12/23/2015    DDD (degenerative disc disease), lumbar 12/23/2015    Cerebral infarction due to embolism of middle cerebral artery 08/13/2015    Hypoalbuminemia 07/14/2015    Hypertensive renal disease 12/10/2014    Spinal stenosis of sacral and sacrococcygeal region 07/23/2014    Bulging discs - symptomatic  05/16/2014    Awaiting organ transplant status 02/19/2013    Neuropathy     FSGS (focal segmental glomerulosclerosis) biopsy proven with collapsing features     Anemia in chronic kidney disease, on chronic dialysis     Hypertension     Hyperlipidemia     Obesity     CAD (coronary artery disease) with recent PCI 12/18/2012     Diabetes mellitus, type 2      ESRD (end stage renal disease) on PD ( initiated dialysis 04/20/2004) 04/20/2004     NSTEMI type 2 2/2 hypovolemia from acute blood loss anemia   Peritonitis  sCHF (EF30) 2/2 ICM  CAD s/p PCI in 2012  ESRD on PD at home     Recommendations:     -she is not fluid overloaded on exam  -would bolus with IVF if she becomes unstable  -transfuse to keep Hb > 8 in setting of known CAD  -nephrology switching her to HD today and will manage her fluids since she is not making urine.  -strict I/Os  -daily weights  -monitor on telemetry  -would hold all GDMT (ASA, statin, BB, ACE/ARB) until her acute issues resolve  -rest per other specialties and primary team    Keenan Mart MD  [employer] Cardiology PGY-4

## 2019-03-13 NOTE — PROGRESS NOTES
"Vancomycin Dosing and Monitoring Pharmacy Protocol    Jenni Todd is a 48 y.o. female    Height: 5' 7" (1.702 m)   Wt Readings from Last 3 Encounters:   03/13/19 99.7 kg (219 lb 12.8 oz)   12/19/18 105.2 kg (232 lb)   09/21/18 102.1 kg (224 lb 16 oz)       Temp Readings from Last 3 Encounters:   03/13/19 96 °F (35.6 °C) (Oral)   12/19/18 98.9 °F (37.2 °C) (Oral)   09/24/18 97.6 °F (36.4 °C)      Lab Results   Component Value Date/Time    WBC 11.20 03/13/2019 06:51 AM    WBC 9.18 03/12/2019 06:54 AM    WBC 8.82 03/11/2019 06:52 AM      Lab Results   Component Value Date/Time    CREATININE 8.9 (H) 03/13/2019 09:18 AM    CREATININE 8.7 (H) 03/13/2019 06:51 AM    CREATININE 8.7 (H) 03/13/2019 06:51 AM      Lab Results   Component Value Date/Time    LACTATE 7.0 (HH) 03/13/2019 09:18 AM    LACTATE 2.1 03/01/2019 09:46 AM    LACTATE 2.0 09/20/2018 03:30 AM       Serum creatinine: 8.9 mg/dL (H) 03/13/19 0918  Estimated creatinine clearance: 9.3 mL/min (A)    Antibiotics (From admission, onward)      Start     Stop Route Frequency Ordered    03/14/19 1000  vancomycin 750 mg in dextrose 5 % 250 mL IVPB (ready to mix system)      -- IV Every 48 hours (non-standard times) 03/13/19 1043    03/13/19 1045  piperacillin-tazobactam 4.5 g in dextrose 5 % 100 mL IVPB (ready to mix system)      -- IV Every 12 hours (non-standard times) 03/13/19 0943          Antifungals (From admission, onward)      Start     Stop Route Frequency Ordered    03/13/19 1100  fluconazole (DIFLUCAN) IVPB 200 mg 100 mL      -- IV Every 24 hours (non-standard times) 03/13/19 1028            Microbiology Results (last 7 days)       Procedure Component Value Units Date/Time    Blood culture [[id number]]     Order Status:  Sent Specimen:  Blood     Blood culture [[id number]]     Order Status:  Sent Specimen:  Blood     Aerobic culture [[id number]] Collected:  03/12/19 1431    Order Status:  Completed Specimen:  Body Fluid from Peritoneal Fluid Updated:  " 03/13/19 0730     Aerobic Bacterial Culture No growth    Gram stain [[id number]] Collected:  03/12/19 1431    Order Status:  Completed Specimen:  Body Fluid from Peritoneal Fluid Updated:  03/12/19 2214     Gram Stain Result Rare WBC's      No organisms seen    Fungus culture [[id number]] Collected:  03/12/19 1431    Order Status:  Sent Specimen:  Body Fluid from Peritoneal Fluid Updated:  03/12/19 1942    Aerobic culture [[id number]] Collected:  03/08/19 1729    Order Status:  Completed Specimen:  Body Fluid from Peritoneal Fluid Updated:  03/12/19 1014     Aerobic Bacterial Culture No growth    Culture, Anaerobe [[id number]] Collected:  03/08/19 1729    Order Status:  Completed Specimen:  Body Fluid from Peritoneal Fluid Updated:  03/12/19 0733     Anaerobic Culture Culture in progress    Blood culture [[id number]] Collected:  03/03/19 1625    Order Status:  Completed Specimen:  Blood Updated:  03/08/19 2012     Blood Culture, Routine No growth after 5 days.    Culture, Anaerobe [[id number]] Collected:  03/01/19 1542    Order Status:  Completed Specimen:  Body Fluid from Abdomen Updated:  03/08/19 0819     Anaerobic Culture No anaerobes isolated    Culture, Fluid  (Aerobic) [[id number]]  (Susceptibility) Collected:  03/01/19 1439    Order Status:  Completed Specimen:  Body Fluid from Ascites Updated:  03/07/19 1001     AEROBIC CULTURE - FLUID --     ENTEROBACTER CLOACAE  From broth only       AEROBIC CULTURE - FLUID --     STAPHYLOCOCCUS AUREUS  From broth only       Gram Stain Result Rare WBC's      No organisms seen            Indication/Target trough:   PD cathether infection ~15-20mcg/mL     Hemodialysis:   Peritoneal Dialysis     Dosing Weight:   Wt Readings from Last 1 Encounters:   03/13/19 99.7 kg (219 lb 12.8 oz)       Last Vancomycin dose: N/A   Date/Time given: N/A         Vancomycin level:  No results for input(s): VANCOMYCIN-TROUGH in the last 72 hours.  Recent Labs   Lab Result Units 03/13/19  4961    Vancomycin, Random ug/mL 22.9       Per Protocol Initial/Adjustments Dosin. Initial/Adjustment Dose: After discussion with ID team, HOLD next Vancomycin dose due to elevated level and/or worsening renal function.  2. Vancomycin Random Level will be drawn on 3/14 0400date/time. Will re-assess in AM, plan to dose tomorrow if level < 17mcg/mL     Pharmacy will continue to follow.    Please contact if you have any further questions. Thank you.    Milton Alegria, PharmD  [phone]

## 2019-03-13 NOTE — PLAN OF CARE
Dialysis catheter could not be inserted subclavian or jugular, finally placed left fem, blood return x3, all lumens saline flushed,      1140 chest and kub xrays done, reviewed by Dr Solis, stated may use the line

## 2019-03-13 NOTE — [employer] PATIENT INSTRUCTIONS
Discharge Summary/Instructions after an Endoscopic Procedure  Patient Name: Jenni Todd  Patient MRN: [id number]  Patient YOB: 1970  Wednesday, March 13, 2019  Adolph Davila MD  RESTRICTIONS:  During your procedure today, you received medications for sedation.  These   medications may affect your judgment, balance and coordination.  Therefore,   for 24 hours, you have the following restrictions:   - DO NOT drive a car, operate machinery, make legal/financial decisions,   sign important papers or drink alcohol.    ACTIVITY:  Today: no heavy lifting, straining or running due to procedural   sedation/anesthesia.  The following day: return to full activity including work.  DIET:  Eat and drink normally unless instructed otherwise.     TREATMENT FOR COMMON SIDE EFFECTS:  - Mild abdominal pain, nausea, belching, bloating or excessive gas:  rest,   eat lightly and use a heating pad.  - Sore Throat: treat with throat lozenges and/or gargle with warm salt   water.  - Because air was used during the procedure, expelling large amounts of air   from your rectum or belching is normal.  - If a bowel prep was taken, you may not have a bowel movement for 1-3 days.    This is normal.  SYMPTOMS TO WATCH FOR AND REPORT TO YOUR PHYSICIAN:  1. Abdominal pain or bloating, other than gas cramps.  2. Chest pain.  3. Back pain.  4. Signs of infection such as: chills or fever occurring within 24 hours   after the procedure.  5. Rectal bleeding, which would show as bright red, maroon, or black stools.   (A tablespoon of blood from the rectum is not serious, especially if   hemorrhoids are present.)  6. Vomiting.  7. Weakness or dizziness.  GO DIRECTLY TO THE NEAREST EMERGENCY ROOM IF YOU HAVE ANY OF THE FOLLOWING:      Difficulty breathing              Chills and/or fever over 101 F   Persistent vomiting and/or vomiting blood   Severe abdominal pain   Severe chest pain   Black, tarry stools   Bleeding- more than one  tablespoon   Any other symptom or condition that you feel may need urgent attention  Your doctor recommends these additional instructions:  If any biopsies were taken, your doctors clinic will contact you in 1 to 2   weeks with any results.  - Return patient to ICU for ongoing care.   - Clear liquid diet today. NPO at midnight.  - Continue present medications including protonix 40mg IV twice daily.  - Reglan IV x 3 doses overnight   - Repeat upper endoscopy tomorrow because the visualization was poor.  For questions, problems or results please call your physician - Adolph Davila MD at Work:  [phone].  EMERGENCY PHONE NUMBER: [phone],  LAB RESULTS: [phone]  IF A COMPLICATION OR EMERGENCY SITUATION ARISES AND YOU ARE UNABLE TO REACH   YOUR PHYSICIAN - GO DIRECTLY TO THE EMERGENCY ROOM.  MD Adolph López MD  3/13/2019 4:43:14 PM  This report has been verified and signed electronically.  [employer]

## 2019-03-13 NOTE — PLAN OF CARE
Dr Can here gi, spoke with pt, another egd tomorrow, will give reglan to stimulate bowels to move, to get rid of the blood, may have clear liquids, pt can not get comfortable, wants to sit , with legs out of bed, again told her of the dialysis catheter in left groin, can not bend or the catheter will kink, tried to turn her, reposition her,

## 2019-03-13 NOTE — PROGRESS NOTES
"Vancomycin Dosing and Monitoring Pharmacy Protocol    Jenni Todd is a 48 y.o. female    Height: 5' 7" (1.702 m)   Wt Readings from Last 3 Encounters:   03/13/19 99.7 kg (219 lb 12.8 oz)   12/19/18 105.2 kg (232 lb)   09/21/18 102.1 kg (224 lb 16 oz)       Temp Readings from Last 3 Encounters:   03/13/19 96 °F (35.6 °C) (Oral)   12/19/18 98.9 °F (37.2 °C) (Oral)   09/24/18 97.6 °F (36.4 °C)      Lab Results   Component Value Date/Time    WBC 11.20 03/13/2019 06:51 AM    WBC 9.18 03/12/2019 06:54 AM    WBC 8.82 03/11/2019 06:52 AM      Lab Results   Component Value Date/Time    CREATININE 8.9 (H) 03/13/2019 09:18 AM    CREATININE 8.7 (H) 03/13/2019 06:51 AM    CREATININE 8.7 (H) 03/13/2019 06:51 AM      Lab Results   Component Value Date/Time    LACTATE 7.0 (HH) 03/13/2019 09:18 AM    LACTATE 2.1 03/01/2019 09:46 AM    LACTATE 2.0 09/20/2018 03:30 AM       Serum creatinine: 8.9 mg/dL (H) 03/13/19 0918  Estimated creatinine clearance: 9.3 mL/min (A)    Antibiotics (From admission, onward)      Start     Stop Route Frequency Ordered    03/13/19 1800  vancomycin 750 mg in dextrose 5 % 250 mL IVPB (ready to mix system)  (Vancomycin IVPB with levels panel)      -- IV Every 48 hours (non-standard times) 03/13/19 1013    03/13/19 1045  piperacillin-tazobactam 4.5 g in dextrose 5 % 100 mL IVPB (ready to mix system)      -- IV Every 12 hours (non-standard times) 03/13/19 0943          Antifungals (From admission, onward)      None            Microbiology Results (last 7 days)       Procedure Component Value Units Date/Time    Blood culture [[id number]]     Order Status:  Sent Specimen:  Blood     Blood culture [[id number]]     Order Status:  Sent Specimen:  Blood     Aerobic culture [[id number]] Collected:  03/12/19 1431    Order Status:  Completed Specimen:  Body Fluid from Peritoneal Fluid Updated:  03/13/19 0730     Aerobic Bacterial Culture No growth    Gram stain [[id number]] Collected:  03/12/19 1431    Order Status: "  Completed Specimen:  Body Fluid from Peritoneal Fluid Updated:  03/12/19 2214     Gram Stain Result Rare WBC's      No organisms seen    Fungus culture [[id number]] Collected:  03/12/19 1431    Order Status:  Sent Specimen:  Body Fluid from Peritoneal Fluid Updated:  03/12/19 1942    Aerobic culture [[id number]] Collected:  03/08/19 1729    Order Status:  Completed Specimen:  Body Fluid from Peritoneal Fluid Updated:  03/12/19 1014     Aerobic Bacterial Culture No growth    Culture, Anaerobe [[id number]] Collected:  03/08/19 1729    Order Status:  Completed Specimen:  Body Fluid from Peritoneal Fluid Updated:  03/12/19 0733     Anaerobic Culture Culture in progress    Blood culture [[id number]] Collected:  03/03/19 1625    Order Status:  Completed Specimen:  Blood Updated:  03/08/19 2012     Blood Culture, Routine No growth after 5 days.    Culture, Anaerobe [[id number]] Collected:  03/01/19 1542    Order Status:  Completed Specimen:  Body Fluid from Abdomen Updated:  03/08/19 0819     Anaerobic Culture No anaerobes isolated    Culture, Fluid  (Aerobic) [[id number]]  (Susceptibility) Collected:  03/01/19 1439    Order Status:  Completed Specimen:  Body Fluid from Ascites Updated:  03/07/19 1001     AEROBIC CULTURE - FLUID --     ENTEROBACTER CLOACAE  From broth only       AEROBIC CULTURE - FLUID --     STAPHYLOCOCCUS AUREUS  From broth only       Gram Stain Result Rare WBC's      No organisms seen            Indication/Target trough:   Intra abdominal infection     Hemodialysis:   MWF    Dosing Weight:   Wt Readings from Last 1 Encounters:   03/13/19 99.7 kg (219 lb 12.8 oz)       Last Vancomycin dose: N/A   Date/Time given: N/A         Vancomycin level:  No results for input(s): VANCOMYCIN-TROUGH in the last 72 hours.  Recent Labs   Lab Result Units 03/13/19  0651   Vancomycin, Random ug/mL 22.9       Per Protocol Initial/Adjustments Dosing: Based on the vanco random this morning of 22.9     1. Initial/Adjustment Dose:  HEMODIALYSIS DOSE: Give maintenance dose of 750 mg after next dialysis session  2. Vancomycin Random Level will be drawn on Daily date/time     Pharmacy will continue to follow.    Please contact if you have any further questions. Thank you.    Bernadette Caballero, PharmD  [phone]

## 2019-03-13 NOTE — ANESTHESIA POSTPROCEDURE EVALUATION
"Anesthesia Post Evaluation    Patient: Jenni Todd    Procedure(s) Performed: Procedure(s) (LRB):  EGD (ESOPHAGOGASTRODUODENOSCOPY) (N/A)    Final Anesthesia Type: MAC  Patient location during evaluation: PACU  Patient participation: Yes- Able to Participate  Level of consciousness: awake and alert, oriented and awake  Post-procedure vital signs: reviewed and stable  Pain management: adequate  Airway patency: patent  PONV status at discharge: No PONV  Anesthetic complications: no      Cardiovascular status: blood pressure returned to baseline  Respiratory status: unassisted and room air  Hydration status: euvolemic  Follow-up not needed.        Visit Vitals  /70   Pulse 99   Temp 37.1 °C (98.8 °F)   Resp 19   Ht 5' 7" (1.702 m)   Wt 99.7 kg (219 lb 12.8 oz)   LMP 01/28/2014 (Approximate)   SpO2 100%   Breastfeeding? No   BMI 34.43 kg/m²       Pain/Yuridia Score: Pain Rating Prior to Med Admin: 7 (3/13/2019  3:34 AM)        "

## 2019-03-13 NOTE — PT/OT/SLP PROGRESS
Physical Therapy  Eval Attempt    Patient Name:  Jenni Todd   MRN:  [id number]    Patient not seen today secondary to pt transferred to ICU following 2 METs called this AM - per nursing, pt with seizure like activity. Will hold PT evaluation this date.    Radha Stephens, PT   3/13/2019

## 2019-03-13 NOTE — PLAN OF CARE
Md of primary team, called re lab results, also notified that pt is getting restless, and anxious, and  Needs something, for anxiety

## 2019-03-13 NOTE — PLAN OF CARE
"Problem: Adult Inpatient Plan of Care  Goal: Plan of Care Review   03/13/19 0031   Plan of Care Review   Plan of Care Reviewed With patient   Pt had 2 black  BM and 4 black emessi. Contacted resident: new orders for sucralfate, Protonix and phenergan.  After several times up to BSC, pt looked exhausted and said "I cant go on like this."  Pt agreed to wear a diaper to conserve energy.      2100 BS was 110, no coverage.     No Tele     Bed in lowest position, wheels locked, non skid socks, ID band worn, personal items and call bell with in reach, bed alarm set.       "

## 2019-03-13 NOTE — PLAN OF CARE
Pt admitted to icu, weak , skin cold  but dry, nail beds dusky,  opens eyes to name, but then closes them, no co of pain, but feels weak, noted iv cath in left jugular is protruding  out, some still at insertion site, placed last early am, pt settled, pulse on monitor 114, st, bp 75 sys, hob down,legs up, hard to hear heart tones, denies sob,  o2 3lnc, decreased breath sounds throughout, ron post bases, radial  Pulses  Weak, could not palpate pedal pulses, abd distended, semifirm, states has been like this, denies pain or nausea , PD catheter in place, dressing dry and intact, flushed jug catheter , not leaking but no blood return, sat's coming in and out, 93%, hands cold    Called anesthesia for iv access, # 22 cath in left  Arm, leaking    Dr yuan here, informed low bp, and need iv access and bld consent, told anesthesia already called  Attempting iv access,nothing seen,  anesthesia here, will attempt piv for now, then central line     1015 Report to Jami JAIN,i needed to leave unit with my other pt, she was already at bedside

## 2019-03-13 NOTE — NURSING
Patient calcium is 6.8. And H&H 6.0, 19.9. Dr. Bueno's team and nephrologist notified. Will continue to monitor.

## 2019-03-13 NOTE — PLAN OF CARE
Feels getting anxious, jettery, can not get comfortable,even with me changing position frequenyly,  took only sips of juice,  wants broth

## 2019-03-13 NOTE — PLAN OF CARE
Responded to the MET on the floor. Pt is anxious, pale, tachycardic and BP is marginal 90s/60s. I spoke with  and recommended patient be transferred to ICU r/t marginal BP and active GIB. He stated he would like to continue to monitor for now and pt is not appropriate for ICU admit. He stated this is her baseline. Dr. Barrientos is at bedside attempting an IV start, current IV is leaking.

## 2019-03-13 NOTE — SIGNIFICANT EVENT
At approximately 9:30 pm on 3/12/19, Patient produced approximately 100cc of Black Vomit seen at bedside and has Black tarry stools. Patient with no improvement of nausea with zofran. Refused GI cocktail.     Plan:  Changed Zofran to prochlorperazine 10mg IV 8h  Started Pantoprazole 80mg IV x 1 and will continue 40mg IV BID thereafter   Started Sucralfate 1g QID  Consider GI consult     Hayden Padilla MD  [employer] [facility], PGY-1

## 2019-03-13 NOTE — PLAN OF CARE
Dr Beltre phoned, wants to give another unit of blood, asked if she was going to be dialyzed tonight, told no new orders, as such, told him what dialysis nurses told me, that no dialysis now unless labs or off, told of gi plan to give reglan and repeat egd am

## 2019-03-13 NOTE — EICU
Called to bedside for trialysis line placement. Consent confirmed with bedside team. Physician verification completed. Time our completed using proper pt identifiers. Attempted to place catheter to (R)IJ without success.  Catheter placed to (L) femoral by MD Solis using u/s guidance.  Good blood return noted from all 3 ports. Lines flush easily.  Pt tolerated procedure well.

## 2019-03-13 NOTE — TRANSFER OF CARE
"Anesthesia Transfer of Care Note    Patient: Jenni Todd    Procedure(s) Performed: Procedure(s) (LRB):  EGD (ESOPHAGOGASTRODUODENOSCOPY) (N/A)    Patient location: ICU    Anesthesia Type: MAC    Transport from OR: Transported from OR on 2-3 L/min O2 by NC with adequate spontaneous ventilation    Post pain: adequate analgesia    Post assessment: no apparent anesthetic complications and tolerated procedure well    Post vital signs: stable    Level of consciousness: awake, alert and oriented    Nausea/Vomiting: no nausea/vomiting    Complications: none    Transfer of care protocol was followed      Last vitals:   Visit Vitals  /70   Pulse 99   Temp 37.1 °C (98.8 °F)   Resp 19   Ht 5' 7" (1.702 m)   Wt 99.7 kg (219 lb 12.8 oz)   LMP 01/28/2014 (Approximate)   SpO2 100%   Breastfeeding? No   BMI 34.43 kg/m²     "

## 2019-03-13 NOTE — PROGRESS NOTES
PGY-2 Progress Note  [facility]  Follow up for:   Chief Complaint   Patient presents with    Vomiting     48 year old female presents to ed cc of abdominal pain with diarrhea that began on sunday and emesis today. patient is pd patient last pd exchange was yesterday full treamtent took antibiotic pd treatment today.      Hospital Stay Day 17    Subjective:   The patient said she feels anxious. Per am nurse she didn't want to wear tele monitor am. Per residents both day and night, the patient has been refusing both oral and iv PPI and  H2 in the past few days. A MET was called when [facility] saw her, both MET and FM team responded to the MET. The patient seems a little confuse and still answering questions appropriately. She was assisted to ambulate to her bed with the nursing staff. A few episodes of hematemeiss and melena, she said last one was a few hours ago. Her vital seemed to respond to the bolus night resident gave, she will be sent to the ICU, started on broad spectrum abx.          Scheduled Meds:   atorvastatin  40 mg Oral QHS    calcitRIOL  0.5 mcg Oral QAM    cinacalcet  90 mg Oral QHS    epoetin adonay (PROCRIT) injection  20,000 Units Subcutaneous Every Mon, Wed, Fri    Lactobacillus acidoph-L.bulgar  4 tablet Oral TID WM    lorazepam  1 mg Intravenous Once    pantoprazole  40 mg Intravenous BID    piperacillin-tazobactam 4.5 g in dextrose 5 % 100 mL IVPB (ready to mix system)  4.5 g Intravenous Q12H    potassium chloride  20 mEq Oral Daily    prochlorperazine  10 mg Intravenous Q8H    ramelteon  8 mg Oral QHS    sevelamer carbonate  800 mg Oral TID WM    sodium bicarbonate  1,300 mg Oral BID    sucralfate  1 g Oral QID (AC & HS)    vancomycin (VANCOCIN) IVPB  15 mg/kg Intravenous Q12H    vitamin renal formula (B-complex-vitamin c-folic acid)  1 capsule Oral Daily     Continuous Infusions:  PRN Meds:sodium chloride, sodium chloride, aluminum-magnesium hydroxide-simethicone, dextrose 50%, dextrose  50%, glucagon (human recombinant), glucose, glucose, HYDROmorphone, insulin aspart U-100, ondansetron, promethazine, simethicone, sodium chloride 0.9%    Review of patient's allergies indicates:   Allergen Reactions    Clindamycin Diarrhea    Flagyl [metronidazole hcl]     Metronidazole        Objectives:     Vitals(Most Recent)      BP  Min: 92/58  Max: 131/65  Temp  Av.6 °F (35.9 °C)  Min: 96 °F (35.6 °C)  Max: 98.3 °F (36.8 °C)  Pulse  Av.7  Min: 76  Max: 135  Resp  Av.8  Min: 17  Max: 20  SpO2  Av.1 %  Min: 92 %  Max: 100 %  Weight  Av.7 kg (219 lb 12.8 oz)  Min: 99.7 kg (219 lb 12.8 oz)  Max: 99.7 kg (219 lb 12.8 oz)             Vitals(Vgbq24y)  Temp:  [96 °F (35.6 °C)-98.3 °F (36.8 °C)]   Pulse:  []   Resp:  [17-20]   BP: ()/(49-67)   SpO2:  [92 %-100 %]     I & O(Qusg92b)    Intake/Output Summary (Last 24 hours) at 3/13/2019 0954  Last data filed at 3/13/2019 0400  Gross per 24 hour   Intake 143 ml   Output 2461 ml   Net -2318 ml     Physical Exam:   General: Anxious  HEENT: NCAT. PERRLA. EOMI grossly intact.     CV: RRR. No M/R/G. No S3  Chest: on NC, sats well. NO wob. No rales heard on exam.   Abd: +BS x 4. Diffuse tense. No rebound or guarding. No peritoneal signs.   Ext: moving well. +distal pulses  Skin: Intact. No rash. No lesions.   Neuro: CN II-XII intact. No focal deficit.    LABS  CBC  Recent Labs   Lab 19  0652 19  0654 19  0651 19  0844   WBC 8.82 9.18 11.20  --    RBC 2.74* 2.91* 2.03*  --    HGB 8.2* 8.7* 6.0* 6.0*   HCT 26.5* 28.5* 19.9* 20.1*   * 381* 402*  --    MCV 97 98 98  --    MCH 29.9 29.9 29.6  --    MCHC 30.9* 30.5* 30.2*  --        CMP  Recent Labs   Lab 19  0654 19  0651 19  0918   *  131* 129*  129* 129*   K 3.5  3.6 4.0  3.9 4.3   CO2 28  28 24  24 22*   CL 88*  89* 88*  88* 89*   BUN 30*  29* 51*  50* 51*   CREATININE 7.8*  7.8* 8.7*  8.7* 8.9*   *  143* 135*   134* 160*     Recent Labs   Lab 03/11/19  0652 03/12/19  0654 03/13/19  0651 03/13/19  0918   CALCIUM 7.4*  7.3* 7.4*  7.4* 6.8*  6.8* 7.0*   MG 1.6 1.4* 1.6  --    PHOS 5.1*  5.5* 5.5*  5.5* 5.8*  5.8*  --      Recent Labs   Lab 03/12/19  0654 03/13/19  0651 03/13/19  0918   PROT 6.6 5.3* 5.4*   ALBUMIN 1.8*  1.8* 1.6*  1.5* 1.6*   BILITOT 0.3 0.2 0.2   AST 16 13 15   ALKPHOS 131 98 101   ALT <5* <5* <5*       LAST HbA1c  Lab Results   Component Value Date    HGBA1C 6.3 (H) 09/12/2018     Micro:  Microbiology Results (last 7 days)     Procedure Component Value Units Date/Time    Blood culture [[id number]]     Order Status:  Sent Specimen:  Blood     Blood culture [[id number]]     Order Status:  Sent Specimen:  Blood     Aerobic culture [[id number]] Collected:  03/12/19 1431    Order Status:  Completed Specimen:  Body Fluid from Peritoneal Fluid Updated:  03/13/19 0730     Aerobic Bacterial Culture No growth    Gram stain [[id number]] Collected:  03/12/19 1431    Order Status:  Completed Specimen:  Body Fluid from Peritoneal Fluid Updated:  03/12/19 2214     Gram Stain Result Rare WBC's      No organisms seen    Fungus culture [[id number]] Collected:  03/12/19 1431    Order Status:  Sent Specimen:  Body Fluid from Peritoneal Fluid Updated:  03/12/19 1942    Aerobic culture [[id number]] Collected:  03/08/19 1729    Order Status:  Completed Specimen:  Body Fluid from Peritoneal Fluid Updated:  03/12/19 1014     Aerobic Bacterial Culture No growth    Culture, Anaerobe [[id number]] Collected:  03/08/19 1729    Order Status:  Completed Specimen:  Body Fluid from Peritoneal Fluid Updated:  03/12/19 0733     Anaerobic Culture Culture in progress    Blood culture [[id number]] Collected:  03/03/19 1625    Order Status:  Completed Specimen:  Blood Updated:  03/08/19 2012     Blood Culture, Routine No growth after 5 days.    Culture, Anaerobe [[id number]] Collected:  03/01/19 1542    Order Status:  Completed Specimen:  Body  Fluid from Abdomen Updated:  03/08/19 0819     Anaerobic Culture No anaerobes isolated    Culture, Fluid  (Aerobic) [[id number]]  (Susceptibility) Collected:  03/01/19 1439    Order Status:  Completed Specimen:  Body Fluid from Ascites Updated:  03/07/19 1001     AEROBIC CULTURE - FLUID --     ENTEROBACTER CLOACAE  From broth only       AEROBIC CULTURE - FLUID --     STAPHYLOCOCCUS AUREUS  From broth only       Gram Stain Result Rare WBC's      No organisms seen    Fungus culture [[id number]] Collected:  03/01/19 1542    Order Status:  Completed Specimen:  Body Fluid from Abdomen Updated:  03/06/19 0857     Fungus (Mycology) Culture Culture in progress        Assessment/Plan:   47 y/o F with h/o HTN, DM, PAD, AoCD 2/2 CKD, on PD since 2004 presented with peritonitis.     Upper GI bleed  Refusing PPI and H2 in the few days.  Refusing telemetry am.  On PPI IV started today.  [facility] consulted  Prepared for 3 ui, will give leukoreduced  Plan is to scope her after 1st transfusion if patient responds appropriately.   If patient not responding appropriately will CTA chest, and CT abd.      Peritonitis 2/2 Peritoneal Dialysis  PD initiated on 4/20/04  Remains on abx  Continue IP oxacillin, IV vanc, and IV bactrim vanc level remains supra therapeutic so will continue to hold IV vanc today; last dosed on 3/7  PD fluid aerobic cx sensitive to bactrim  WBC 1000s  General surgery c/s and recommend removal and PD catheter and placement of permacath.  Will plan for OR tomorrow and NPO at midnight. Patient's ECHO with EF 30% but she is asymptomatic and without evidence of S3. She denies orthopnea or PND and is able to lie flat w/o symptoms.   ID would like to broad IV abx to cefipime, PCP started on broad spx, because pt is allergic to flagyl. She is on vanc/zosyn. ID agreed to the curren abx plan. The team discussed, will add diflucan.  LA 7. Re-culture x2      ESRD  Managed by Nephro.   In transition to HD and  PD.     Hypotension  MAP 50s.   Received 1bolus am 500cc overnight, responding.     Anemia of Chronic Disease secondary to CKD  She passed some melena overnight  H/H dropped to 6.0 repeated.   Spoke with Dr. Vallecillo, ordered leukoreduced blood  Will transfuse 1 unit with recheck    Spoke with [facility], who recommends Upper EGD post transfusion H/H     DM  A1C (9/12/18): 6.3  Continue to monitor with POCT glucose QID  Currently on SSI     Code: full  Diet: ReNPO    Dispo: Transferred to the ICU. Getting a central line by Dr. Solis, adding vanc/zosyn/diflucan, the infected cath will need to come out tomorrow per nephro. Pending transfusion for hemorrhagic shock, will check H/H after 1-2 unit, GI-Upper GI scope afterwards.    Case d/w staff.     Loyd Beltre MD HO2  [facility]   [employer] chat is available. Cntl + ALT +5

## 2019-03-13 NOTE — PT/OT/SLP PROGRESS
Occupational Therapy  Visit Attempt/Hold OT evaluation    Patient Name:  Jenni Todd   MRN:  [id number]    Patient not seen today secondary to pt t/f to ICU following 2 METs in AM; seizure like activity noted per nursing. Will hold evaluation today's date.     Alia Luna, OT  3/13/2019

## 2019-03-13 NOTE — CODE DOCUMENTATION
0855 MET called.  Patient in sofa  4 doctors from the team at the bedside  0857 MET team at the bedside with house supervisor            B/P 127/67   0858 blood sugar 184  0859 patient placed in bed  0901 patient transferred to ICU   0902 MET ended

## 2019-03-13 NOTE — ANESTHESIA PREPROCEDURE EVALUATION
03/13/2019  Pre-operative evaluation for Procedure(s) (LRB): PD catheter removal    Jenni Todd is a 48 y.o. female  with a history of CAD s/p PCI in 2012 with BMS on DAPT, well controlled HTN, HLD, poorly controlled DM II, ESRD on PD, Von Willebrands disease and Factor V Liden          Pre-op Assessment    I have reviewed the Patient Summary Reports.     I have reviewed the Nursing Notes.   I have reviewed the Medications.     Review of Systems  Anesthesia Hx:  No problems with previous Anesthesia  History of prior surgery of interest to airway management or planning: Denies Family Hx of Anesthesia complications.   Denies Personal Hx of Anesthesia complications.   Social:  Non-Smoker    Hematology/Oncology:     Oncology Normal    -- Anemia: Anemia of Chronic Kidney Disease  Chronic  Hematology Comments: H/H 8.7/28    EENT/Dental:EENT/Dental Normal   Cardiovascular:   Exercise tolerance: poor Hypertension, well controlled Past MI CAD  CABG/stent (BMS 2012)   Denies Angina. CHF         PVD ECG has been reviewed. TTE: EF 30%, G2DD, Mild aortic valve stenosis. ARNOLD 1.68 cm2; peak velocity is 2.28 m/s; mean gradient is 12 mmHg.    EKG: NSR    Stents 2005   Pulmonary:  Pulmonary Normal    Renal/:   Chronic Renal Disease (on PD x12 years), ESRD Peritomeal dialysys    Dilutional hyochloremic hyponatremia Na 130    Corrected Ca wnl   Hepatic/GI:  Hepatic/GI Normal    Musculoskeletal:   Arthritis   Spine Disorders: (Spinal stenosis) lumbar Chronic Pain and Disc disease    Neurological:   TIA, CVA, no residual symptoms Headaches Uses scooter to get around  Peripheral Neuropathy    Endocrine:   Diabetes, well controlled, using insulin A1C 6.3   Dermatological:  Skin Normal    Psych:  Psychiatric Normal           Physical Exam  General:  Obesity    Airway/Jaw/Neck:  Airway Findings: Mouth Opening: Normal Tongue:  Normal  General Airway Assessment: Adult  Mallampati: IV  TM Distance: Normal, at least 6 cm  Jaw/Neck Findings:     Neck ROM: Normal ROM      Dental:  Dental Findings: In tact   Chest/Lungs:  Chest/Lungs Findings: Clear to auscultation, Normal Respiratory Rate     Heart/Vascular:  Heart Findings: Rate: Normal  Rhythm: Regular Rhythm  Sounds: Normal     Abdomen:  Abdomen Findings:  Normal, Soft, Tenderness            Anesthesia Plan  Type of Anesthesia, risks & benefits discussed:  Anesthesia Type:  MAC  Patient's Preference:   Intra-op Monitoring Plan: standard ASA monitors  Intra-op Monitoring Plan Comments:   Post Op Pain Control Plan: multimodal analgesia  Post Op Pain Control Plan Comments:   Induction:   IV  Beta Blocker:  Patient is on a Beta-Blocker and has received one dose within the past 24 hours (No further documentation required).       Informed Consent: Patient understands risks and agrees with Anesthesia plan.  Questions answered. Anesthesia consent signed with patient.  ASA Score: 4     Day of Surgery Review of History & Physical: I have interviewed and examined the patient. I have reviewed the patient's H&P dated:   Significant changes noted:, Patient now with reduced EF 65% to 30% from her recent echo on 2/3019.  Patient is currently asymptomatic and able to lie flat without dyspnea with clear lung sounds and currently no abnormal heart sounds heard on auscultation. Primary team family medicine notified of recent TTE study.   Surgeon notified.   H&P completed by Anesthesiologist.   Anesthesia Plan Notes: Episode of hypotension this am. Dialysis catheter placed by surgery.        Ready For Surgery From Anesthesia Perspective.

## 2019-03-13 NOTE — PROGRESS NOTES
"Vancomycin Dosing and Monitoring Pharmacy Protocol    Jenni Todd is a 48 y.o. female    Height: 5' 7" (1.702 m)   Wt Readings from Last 3 Encounters:   03/13/19 99.7 kg (219 lb 12.8 oz)   12/19/18 105.2 kg (232 lb)   09/21/18 102.1 kg (224 lb 16 oz)       Temp Readings from Last 3 Encounters:   03/13/19 96 °F (35.6 °C) (Oral)   12/19/18 98.9 °F (37.2 °C) (Oral)   09/24/18 97.6 °F (36.4 °C)      Lab Results   Component Value Date/Time    WBC 11.20 03/13/2019 06:51 AM    WBC 9.18 03/12/2019 06:54 AM    WBC 8.82 03/11/2019 06:52 AM      Lab Results   Component Value Date/Time    CREATININE 8.9 (H) 03/13/2019 09:18 AM    CREATININE 8.7 (H) 03/13/2019 06:51 AM    CREATININE 8.7 (H) 03/13/2019 06:51 AM      Lab Results   Component Value Date/Time    LACTATE 7.0 (HH) 03/13/2019 09:18 AM    LACTATE 2.1 03/01/2019 09:46 AM    LACTATE 2.0 09/20/2018 03:30 AM       Serum creatinine: 8.9 mg/dL (H) 03/13/19 0918  Estimated creatinine clearance: 9.3 mL/min (A)    Antibiotics (From admission, onward)      Start     Stop Route Frequency Ordered    03/13/19 1100  vancomycin (VANCOCIN) 2,000 mg in dextrose 5 % 500 mL IVPB  (Vancomycin IVPB with levels panel)      -- IV Once 03/13/19 0957    03/13/19 1045  piperacillin-tazobactam 4.5 g in dextrose 5 % 100 mL IVPB (ready to mix system)      -- IV Every 12 hours (non-standard times) 03/13/19 0943          Antifungals (From admission, onward)      None            Microbiology Results (last 7 days)       Procedure Component Value Units Date/Time    Blood culture [[id number]]     Order Status:  Sent Specimen:  Blood     Blood culture [[id number]]     Order Status:  Sent Specimen:  Blood     Aerobic culture [[id number]] Collected:  03/12/19 1431    Order Status:  Completed Specimen:  Body Fluid from Peritoneal Fluid Updated:  03/13/19 0730     Aerobic Bacterial Culture No growth    Gram stain [[id number]] Collected:  03/12/19 1431    Order Status:  Completed Specimen:  Body Fluid from " Peritoneal Fluid Updated:  19 2214     Gram Stain Result Rare WBC's      No organisms seen    Fungus culture [[id number]] Collected:  19 1431    Order Status:  Sent Specimen:  Body Fluid from Peritoneal Fluid Updated:  19 1942    Aerobic culture [[id number]] Collected:  19 172    Order Status:  Completed Specimen:  Body Fluid from Peritoneal Fluid Updated:  19 1014     Aerobic Bacterial Culture No growth    Culture, Anaerobe [[id number]] Collected:  19 1729    Order Status:  Completed Specimen:  Body Fluid from Peritoneal Fluid Updated:  19 0733     Anaerobic Culture Culture in progress    Blood culture [[id number]] Collected:  19 1625    Order Status:  Completed Specimen:  Blood Updated:  19     Blood Culture, Routine No growth after 5 days.    Culture, Anaerobe [[id number]] Collected:  19 1542    Order Status:  Completed Specimen:  Body Fluid from Abdomen Updated:  19 0819     Anaerobic Culture No anaerobes isolated    Culture, Fluid  (Aerobic) [[id number]]  (Susceptibility) Collected:  19 1439    Order Status:  Completed Specimen:  Body Fluid from Ascites Updated:  19 1001     AEROBIC CULTURE - FLUID --     ENTEROBACTER CLOACAE  From broth only       AEROBIC CULTURE - FLUID --     STAPHYLOCOCCUS AUREUS  From broth only       Gram Stain Result Rare WBC's      No organisms seen            Indication/Target trough:   Intra abdominal infection     Hemodialysis:   MWF    Dosing Weight:   Wt Readings from Last 1 Encounters:   19 99.7 kg (219 lb 12.8 oz)       Last Vancomycin dose: N/A   Date/Time given: N/A         Vancomycin level:  No results for input(s): VANCOMYCIN-TROUGH in the last 72 hours.  Recent Labs   Lab Result Units 19  0651   Vancomycin, Random ug/mL 22.9       Per Protocol Initial/Adjustments Dosin. Initial/Adjustment Dose: HEMODIALYSIS DOSE: Loading dose = 2000 mg to be given at 3/13/19 at 1100 date/time,  followed by Maintenance dose of 1000mg after next dialysis session  2. Vancomycin Random Level will be drawn on 3/15/19 in the morning date/time     Pharmacy will continue to follow.    Please contact if you have any further questions. Thank you.    Bernadette Caballero, PharmD  [phone]

## 2019-03-13 NOTE — CONSULTS
[facility]    CC: anemia    HPI 48 y.o. female with pmh ESRD on PD, CAD on DAPT presents for acute on chronic moderate normocytic anemia associated with acute onset melena for one day. Patient was admitted for concern for peritonitis. Patient reports chronic nausea related to dialysis/ESRD. She denies abdominal pain but noted dark, tarry stool yesterday along with black, coffee-ground emesis today. Her hemoglobin had been stable around 8 throughout hospital course, then decreased to 6 today. Primary team reports patient denying PPI use. She denies NSAID use, but DAPT was held today due to concern for GI bleed. She has a history of rectal bleeding from September, where she was found to have a bleeding rectal ulcer and large, pedunculated cecal polyp. Both were treated and she denies bright red blood in stool since then. Patient had a nonspecific neurologic episode during the history and physical, which resulted in a MET. She was subsequently transferred to ICU though vitals and exam returned to baseline.    Additional history provided by patient's sister.    Past Medical History   has a past medical history of Abnormal finding on Pap smear, HPV DNA positive (), Anemia associated with chronic renal failure, Blood type B+, Bulging discs - symptomatic , CAD (coronary artery disease), Diabetes mellitus, type 2, ESRD (end stage renal disease) (2004), FSGS (focal segmental glomerulosclerosis), Hyperlipidemia, Hypertension (), Neuropathy, Obesity, Secondary hyperparathyroidism, renal, TIA (transient ischemic attack), and Uterine fibroid.    Past Surgical History   has a past surgical history that includes Peritoneal catheter insertion; Umbilical hernia repair;  section, classic; Dialysis fistula creation; Cardiac catheterization; Incision and drainage of wound (Left, 2016); Open reduction and internal fixation (ORIF) of injury of hip (Left, 2018); and Colonoscopy (N/A,  "9/21/2018).    Social History  Social History     Tobacco Use    Smoking status: Never Smoker    Smokeless tobacco: Never Used   Substance Use Topics    Alcohol use: No     Comment: Pt reports some social use of about 1-2 drinks about every six months.    Drug use: No       Family History  Family History   Problem Relation Age of Onset    Cancer Maternal Grandmother     Cancer Paternal Grandfather     Diabetes Maternal Aunt     Diabetes Paternal Aunt     Kidney disease Neg Hx     Heart disease Neg Hx     Ovarian cancer Neg Hx     Breast cancer Neg Hx        Review of Systems  General ROS: negative for chills, fever or weight loss  Psychological ROS: negative for hallucination, depression or suicidal ideation  Ophthalmic ROS: negative for blurry vision, photophobia or eye pain  ENT ROS: negative for epistaxis, sore throat or rhinorrhea  Respiratory ROS: no cough, shortness of breath, or wheezing  Cardiovascular ROS: no chest pain or dyspnea on exertion  Gastrointestinal ROS: no abdominal pain, change in bowel habits. Black stool  Genito-Urinary ROS: anuria  Musculoskeletal ROS: negative for gait disturbance. Generalized muscular weakness  Neurological ROS: no syncope. Concern for seizure this morning  Dermatological ROS: negative for pruritis, rash and jaundice    Physical Examination  /67 (BP Location: Right arm)   Pulse (!) 132   Temp 96 °F (35.6 °C) (Oral)   Resp 20   Ht 5' 7" (1.702 m)   Wt 99.7 kg (219 lb 12.8 oz)   LMP 01/28/2014 (Approximate)   SpO2 100%   Breastfeeding? No   BMI 34.43 kg/m²   General appearance: alert, cooperative, no distress  HENT: Normocephalic, atraumatic, neck symmetrical, no nasal discharge   Eyes: conjunctivae/corneas clear, PERRL, EOM's intact  Lungs: clear to auscultation in all fields, no dullness to percussion bilaterally, no wheeze  Heart: normal rate, regular rhythm without rub; palpable peripheral pulses  Abdomen: soft, non-tender; bowel sounds " normoactive; no organomegaly  Extremities: extremities symmetric; no clubbing, cyanosis, or edema  Integument: Skin color, texture, turgor normal; no rashes; hair distrubution normal  Neurologic: Alert and oriented X 3, normal strength, normal coordination  Psychiatric: no pressured speech; normal affect; no evidence of impaired cognition     Labs:  Lab Results   Component Value Date    WBC 11.20 03/13/2019    HGB 6.0 (L) 03/13/2019    HCT 20.1 (L) 03/13/2019    MCV 98 03/13/2019     (H) 03/13/2019     Lab Results   Component Value Date    IRON 19 (L) 09/15/2018    TIBC 182 (L) 09/15/2018    FERRITIN 1,920 (H) 04/16/2016         Imaging:  CT abdomen -- Increased perinephric stranding and soft tissue fullness of the right renal lower pole, possibly infectious/inflammatory though underlying neoplasm not excluded.  Contrast enhanced exam recommended.  If unable to obtain, dedicated ultrasound suggested.    Nodular density in the left outer breast.  Correlate with mammographic history.    Extensive abdominovisceral and peritoneal calcification, similar.    Intra-abdominal ascites with peritoneal dialysis catheter.    I have personally reviewed these images    Medical records reviewed    Assessment:   48 yoF with pmh ESRD on PD, CAD on DAPT presents for acute on chronic anemia associated with melena and coffee ground emesis for one day. Patient with reported melena and acute drop in Hgb from 8 to 6. Vitals stable and exam notable for non-specific neurologic episode which did not seem consistent with epileptic activity. Patient alert and responsive after resolution of event and vitals and glucose stable.    Plan:  -begin protonix 40mg IV BID  -transfuse 2 units PRBC  -trend H/H, maintain hgb>8 given CAD  -NPO  -EGD with MAC today    Discussed with Dr. Davila.    Donavan Can MD  [facility], PGY V  [phone]

## 2019-03-13 NOTE — NURSING
Met called at 0540    0536 PCT Roland was called to room to take pt to bed side commode while on BSC it looked like she was having a seizure and wasn't breathing, nursing called code but code was quickly switched to a MET.      0542  /88, , sp02 99 RA,     0544 /74, .     0545 POCT 148     0552 labs drawn: CBC, CMP, Mg, Phos, PH.     0548 /67,     0553 BP 97/52,     0554 500cc LR bolus,    IV to L FA bad, 1mg ativan not given pending IV site    0601 BP 97/60,     0607 consult to anesthesia for new IV site.     0630 IV site started to L IJ,    1 mg ativan given IVP.     Transfer to ICU requested.

## 2019-03-13 NOTE — PROGRESS NOTES
Progress Note  Nephrology      Consult Requested By: Michael Tan III, MD  Reason for Consult: ESRD    SUBJECTIVE:     Review of Systems   Unable to perform ROS: Critical illness   Gastrointestinal: Positive for abdominal pain and melena.     Patient Active Problem List   Diagnosis    Neuropathy    ESRD (end stage renal disease) on PD ( initiated dialysis 04/20/2004)    FSGS (focal segmental glomerulosclerosis) biopsy proven with collapsing features    Anemia in chronic kidney disease, on chronic dialysis    Hypertension    Hyperlipidemia    Obesity    CAD (coronary artery disease) with recent PCI 12/18/2012    Diabetes mellitus, type 2    Awaiting organ transplant status    Bulging discs - symptomatic     Spinal stenosis of sacral and sacrococcygeal region    Hypertensive renal disease    Hypoalbuminemia    Cerebral infarction due to embolism of middle cerebral artery    Gait abnormality    Chronic low back pain    DDD (degenerative disc disease), lumbar    PAD (peripheral artery disease)    Peritonitis associated with peritoneal dialysis    Peritonitis due to infected peritoneal dialysis catheter    Abdominal pain    Occult GI bleeding    Hypotension due to hypovolemia    Nausea and vomiting       OBJECTIVE:     Medications:   atorvastatin  40 mg Oral QHS    calcitRIOL  0.5 mcg Oral QAM    cinacalcet  90 mg Oral QHS    epoetin adonay (PROCRIT) injection  20,000 Units Subcutaneous Every Mon, Wed, Fri    fluconazole (DIFLUCAN) IVPB  200 mg Intravenous Q24H    Lactobacillus acidoph-L.bulgar  4 tablet Oral TID WM    lorazepam  1 mg Intravenous Once    pantoprazole  40 mg Intravenous BID    piperacillin-tazobactam 4.5 g in dextrose 5 % 100 mL IVPB (ready to mix system)  4.5 g Intravenous Q12H    potassium chloride  20 mEq Oral Daily    prochlorperazine  10 mg Intravenous Q8H    ramelteon  8 mg Oral QHS    sevelamer carbonate  800 mg Oral TID WM    sodium bicarbonate  1,300 mg Oral  BID    sucralfate  1 g Oral QID (AC & HS)    [START ON 3/14/2019] vancomycin (VANCOCIN) IVPB  750 mg Intravenous Q48H    vitamin renal formula (B-complex-vitamin c-folic acid)  1 capsule Oral Daily       Vitals:    03/13/19 1400   BP: 112/70   Pulse: 99   Resp: 19   Temp:      I/O last 3 completed shifts:  In: 233 [P.O.:220; I.V.:13]  Out: 2461 [Emesis/NG output:1775; Other:436; Stool:250]  Physical Exam   Constitutional: She appears well-developed and well-nourished. She appears lethargic. No distress.   HENT:   Head: Normocephalic and atraumatic.   Eyes: EOM are normal. No scleral icterus.   Neck: Neck supple. No JVD present.   Cardiovascular: Normal rate and regular rhythm.   No murmur heard.  Pulmonary/Chest: Effort normal and breath sounds normal. No respiratory distress. She has no wheezes. She has no rales.   Abdominal: Soft. Bowel sounds are normal. She exhibits distension. There is tenderness.       Musculoskeletal: She exhibits no edema.   Neurological: She appears lethargic. GCS eye subscore is 4. GCS verbal subscore is 5. GCS motor subscore is 6.   Skin: Skin is warm and dry. No erythema. No pallor.   Psychiatric: She has a normal mood and affect. Judgment normal.     Laboratory:  Recent Labs   Lab 03/11/19  0652 03/12/19  0654 03/13/19  0651 03/13/19  0844   WBC 8.82 9.18 11.20  --    HGB 8.2* 8.7* 6.0* 6.0*   HCT 26.5* 28.5* 19.9* 20.1*   * 381* 402*  --    MONO 4.6  0.4 4.4  0.4 2.2*  0.3  --      Recent Labs   Lab 03/11/19  0652 03/12/19  0654 03/13/19  0651 03/13/19  0918   *  131* 130*  131* 129*  129* 129*   K 3.7  3.8 3.5  3.6 4.0  3.9 4.3   CL 91*  91* 88*  89* 88*  88* 89*   CO2 25  26 28  28 24  24 22*   BUN 32*  32* 30*  29* 51*  50* 51*   CREATININE 8.0*  7.9* 7.8*  7.8* 8.7*  8.7* 8.9*   CALCIUM 7.4*  7.3* 7.4*  7.4* 6.8*  6.8* 7.0*   PHOS 5.1*  5.5* 5.5*  5.5* 5.8*  5.8*  --      Labs reviewed  Diagnostic Results:  X-Ray: Reviewed  US:  Reviewed  Echo: Reviewed      ASSESSMENT/PLAN:     1. ESRD - usual PD for 12 years with Dr. Watt - 9 hours, 12L, alternating 1.5 and 2.5%. Dry weight 220lb.   2.8L each exchange, but now due to abdominal tenderness decreased to 2L. 6 exchanges over 8 hours  Peritonitis:  Rothia - sensitivity is still not available, on IV bactrim x10 days, 2 more doses remains + oxacillin 2 gr IP, vancomycin IV to maintain lvl ~ 20  Worsening cell count, worsenign albumin 1.7-1.8     Ref. Range 3/8/2019 14:42 3/9/2019 11:42 3/10/2019 13:29 3/11/2019 15:39   WBC, Body Fluid Latest Units: /cu mm 507 71 187 1143       Transition to HD -->plan was to remove PD cath and place tunneled CVC today--> consulted Dr. Solis, but pt got worse now with hemorrhagic vs septic shock transferred to ICU--> emergent femoral line placement, emergent EGD, still need PD cath removal once more stable, 2 units PRBC leukoreduced only  Consulted ID --> appreciate all help    If volume overloaded or any electrolytes abn--> will do CVVHD, otherwise not urgent RRT as of this moment        2. HTN - BP better,   3. Anemia -  Of CKD treated with Epogen + GIB -> 20K Epo q MWF +   Recent Labs   Lab 03/11/19  0652 03/12/19  0654 03/13/19  0651 03/13/19  0844   WBC 8.82 9.18 11.20  --    HGB 8.2* 8.7* 6.0* 6.0*   HCT 26.5* 28.5* 19.9* 20.1*   * 381* 402*  --      Lab Results   Component Value Date    IRON 19 (L) 09/15/2018    TIBC 182 (L) 09/15/2018    FERRITIN 1,920 (H) 04/16/2016     4. MBD - cont Fosrenol, sensipar and calcitriol, replace mg  Lab Results   Component Value Date    .7 (H) 04/19/2016    CALCIUM 7.0 (L) 03/13/2019    PHOS 5.8 (H) 03/13/2019    PHOS 5.8 (H) 03/13/2019     Recent Labs   Lab 03/11/19  0652 03/12/19  0654 03/13/19  0651   MG 1.6 1.4* 1.6     Lab Results   Component Value Date    [id number] 8 (L) 04/19/2016     Lab Results   Component Value Date    CO2 22 (L) 03/13/2019     Recent Labs     03/13/19  0939   PH 7.319*   PCO2  44.5   PO2 17*   HCO3 22.9*   POCSATURATED 21*   BE -3         5. Nutrition - tolerating clear liquid, will try to give nepro with meals  Lab Results   Component Value Date    LABPROT 17.9 (H) 03/13/2019    ALBUMIN 1.6 (L) 03/13/2019     Nepro with meals TID. Renal vitamins daily  6. Worked up by hem/onc in the past - not hypercoagulable - r  Thank you for consult, will follow  With any question please call [phone]  Deanna Ernst    [facility]  ERLIN Watt MD, FACP,   VONDA Vallecillo MD,   MD WIL Dee, NP  [address]  [place], [address] [phone]  After hours answering service: [phone]

## 2019-03-13 NOTE — PROGRESS NOTES
MET was called on patient. Respiratory responded in timely fashion. Pulse ox was placed on patients finger. Patient's SAT was 100% on room air. EKG was obtained and showed to doctor. Patient has another issue that is not respiratory. We were then dismissed by physician.

## 2019-03-13 NOTE — PLAN OF CARE
Report received from Jes, Attending RN. AGUILAR  NPO 1st [facility] infusing. Will clarify  Order for possible surgery today   Patient signs own  Consents

## 2019-03-14 PROBLEM — K92.0 GASTROINTESTINAL HEMORRHAGE WITH HEMATEMESIS: Status: ACTIVE | Noted: 2018-01-01

## 2019-03-14 NOTE — PROGRESS NOTES
[facility]  Infectious Disease  Progress Note    Patient Name: Jenni Todd  MRN: [id number]  Admission Date: 2/21/2019  Length of Stay: 18 days  Attending Physician: Michael Tan III, MD  Primary Care Provider: Michael Tan Iii, MD    Isolation Status: No active isolations  Assessment/Plan:      Peritonitis due to infected peritoneal dialysis catheter     Ms. Jenni Todd is a 48 y.o. F w/ PMH of HTN, DM, PAD, Anemia of chronic disease 2/2 CKD, on PD since 2004 presenting to the ED from [facility] on 2/21/2019  for concerns of peritonitis. She has a h/o peritonitis several times in the past as per records. Patient was in her usual state of health until Sunday 2/17. Patient reported that she first had diarrhea and abdominal cramping. Patient had not been sleeping well and not feeling well since Sunday 2/17.. Patient also reported then diaphoresis, fatigue, subjective fevers and loss of appetite.     At [facility] dialysis [place] the PD fluid was cloudy and on 2/21 PD fluid sent for Cx from [facility] dialysis [place] and started on vancomycin and Ceftazidime as outpatient and sent to ED for admission b/o sickness. As per verbal report by nephrology team, the peritoneal fluid cx gre Rothia species and patient was continued on IV Vanco, peritoneal ceftazdime and then on 3/1 added Cefazolin peritoneal with cefazidime till 3/2. Ceftazidime stopped 3/3. Continued on Vancomycin still as of now at time of consult as per levels, intermittenly receiving IV Vanco. Last level 3/12 vanco level was 26.8.     Patient started to feel again abdominal tenderness, looked again sick and PD fluid cx sent again on 3/1/2019 that grew Enterobacter and MSSA. Pt was started on oxacillin via peritoneum and IV bactrim from 3/5/2019 and IV vancomycin was continued. WBC levels in peritoneal fluids were decreasing and decreased to 71 on 3/9 but now have started to increase again and patient again feeling abdominal  tenderness.Repeated fluid cell count 3/12/19 and is 1025 with 95% segs. Cx sent also.    Patient has finally consented and will go on HD for now. Planning to remove PD catheter on 3/13/2019 and placed catheter for HD for now.   ID consult called for further reccs for Antibiotics    Peritoneal Cx data  2/21 Rothia Species as per verbal report  3/1 Enterobacter and MSSA  3/8 Fluid Cx sent Aerobic NGTD. No AFB seen  3/12 Fluid cx sent for aerobic/fungal/AFB=NGTD      Other cx data  2/21 Blood cx no growth  3/1 [place] cx for Cdiff PCR negative  3/3 Blood cx no growth  3/13 Blood cx NGTD    2/26 TTE showed decreased EF of 30%, no vegetations seen. In 2/2017 EF was 60-65%    ---3/13 Sent to ICU for GI bleed, acute blood loss anemia, to get 3 units PRBCs then EGD then when stable PD cathter removal. Placed Left femoral trialysis catheter  3/14 2nd EGD done shows esophagitis, gastric non bleeding ulcer. PD catheter removed    Currently on IV Vanco intermittently as per levels, Pip tazo and Fluconazole. Off oxacillin and Bactrim  Recommendations  --- Seems like the Rothia species seen in 2/21 peritoneal fluid cx taken outpatient has been treated with ceftazidime/vanco and cefazolin  --- Started on Fluconazole and Pip-tazo for broader coverage 3/13 by primary team. On intermittent Vancomycin levels still high 23  --- Agree to continue Pip tazo that will cover Enterobacter in 3/1 fluid cx and Vancomycin that will cover MSSA. Fluid cx from 3/8 and 3/12 so far NGTD. Continue the same for now possibly soon if pt doing well can de escalate and change to just cefepime and stop pip tazo and Vanco as recommended before for 10 days after source control, PD catheter removal 3/14/2019  --- We will follow the pending blood cx and peritoneal fluid cultures results with you    ID will follow         Anticipated Disposition: ID will follow    Thank you for your consult. I will follow-up with patient. Please contact us if you have any  additional questions.    Peterson Charlton MD   ID Fellow  Infectious Disease  [facility]    Subjective:     Principal Problem:Peritonitis associated with peritoneal dialysis    HPI:     Interval History: pt seen after PD catheter out    Review of Systems   Constitutional: Positive for activity change, appetite change and fatigue. Negative for diaphoresis.   HENT: Negative for trouble swallowing.    Eyes: Negative for photophobia and pain.   Respiratory: Negative for cough, choking, shortness of breath and wheezing.    Gastrointestinal: Positive for abdominal pain, blood in stool, diarrhea and nausea. Negative for abdominal distention.   Genitourinary: Negative for dysuria and flank pain.     Objective:     Vital Signs (Most Recent):  Temp: 98.3 °F (36.8 °C) (03/14/19 1600)  Pulse: 95 (03/14/19 0950)  Resp: (20) (03/14/19 0950)  BP: 112/71 (03/14/19 0950)  SpO2: 100 % (03/14/19 0950) Vital Signs (24h Range):  Temp:  [97.8 °F (36.6 °C)-98.9 °F (37.2 °C)] 98.3 °F (36.8 °C)  Pulse:  [] 95  Resp:  [7-29] 17  SpO2:  [93 %-100 %] 100 %  BP: ()/(50-71) 112/71     Weight: 97.2 kg (214 lb 4.6 oz)  Body mass index is 33.56 kg/m².    Estimated Creatinine Clearance: 8 mL/min (A) (based on SCr of 10.2 mg/dL (H)).    Physical Exam   Constitutional: She is oriented to person, place, and time. She appears well-developed and well-nourished.   HENT:   Head: Normocephalic and atraumatic.   Eyes: EOM are normal. Pupils are equal, round, and reactive to light. No scleral icterus.   Neck: Normal range of motion. Neck supple.   Cardiovascular: Normal rate and regular rhythm.   Murmur heard.  Pulmonary/Chest: Effort normal and breath sounds normal. No respiratory distress. She has no wheezes. She has no rales.   Abdominal: Soft. Bowel sounds are normal. There is tenderness. There is no rebound.   2 dressings seen, PD catheter not present. Left femoral trialysis catheter   Musculoskeletal: She exhibits no edema  or tenderness.   Lymphadenopathy:     She has no cervical adenopathy.   Neurological: She is alert and oriented to person, place, and time.   BL UE and BL LE no gross motor deficits   Skin: Skin is warm.   Dry skin noted on abdomen   Psychiatric:   Calm and cooperative   Nursing note and vitals reviewed.      Significant Labs:   CBC:   Recent Labs   Lab 03/13/19  0651  03/13/19  2318 03/14/19  0819 03/14/19  1626   WBC 11.20  --   --  17.87*  --    HGB 6.0*   < > 10.2* 8.1*  8.1* 6.9*   HCT 19.9*   < > 32.2* 25.5*  25.5* 21.4*   *  --   --  311  --     < > = values in this interval not displayed.     CMP:   Recent Labs   Lab 03/13/19  0918 03/13/19  1645 03/14/19  0818 03/14/19  1634   * 131* 133*  133* 132*   K 4.3 4.3 4.3  4.3 4.6   CL 89* 91* 92*  92* 93*   CO2 22* 28 27  27 24   * 91 96  96 102   BUN 51* 60* 71*  71* 67*   CREATININE 8.9* 9.1* 10.0*  10.0* 10.2*   CALCIUM 7.0* 7.1* 7.4*  7.4* 7.3*   PROT 5.4* 5.1* 5.5*  --    ALBUMIN 1.6* 1.6* 1.7*  1.7* 1.5*   BILITOT 0.2 0.6 0.4  --    ALKPHOS 101 93 92  --    AST 15 26 20  --    ALT <5* <5* <5*  --    ANIONGAP 18* 12 14  14 15   EGFRNONAA 5* 5* 4*  4* 4*     Microbiology Results (last 7 days)     Procedure Component Value Units Date/Time    Fungus culture [[id number]] Collected:  03/12/19 1431    Order Status:  Completed Specimen:  Body Fluid from Peritoneal Fluid Updated:  03/14/19 1100     Fungus (Mycology) Culture Culture in progress    Aerobic culture [[id number]] Collected:  03/12/19 1431    Order Status:  Completed Specimen:  Body Fluid from Peritoneal Fluid Updated:  03/14/19 1055     Aerobic Bacterial Culture No growth    Blood culture [[id number]] Collected:  03/13/19 1203    Order Status:  Completed Specimen:  Blood Updated:  03/14/19 0115     Blood Culture, Routine No Growth to date    Narrative:       Collection has been rescheduled by [facility] at 03/13/2019 10:00 Reason:   doctors & nurses are trying to start a  line  Collection has been rescheduled by [facility] at 03/13/2019 11:03 Reason:   the nurse Gilmer will call if they need lab to draw  Collection has been rescheduled by [facility] at 03/13/2019 10:00 Reason:   doctors & nurses are trying to start a line  Collection has been rescheduled by [facility] at 03/13/2019 11:03 Reason:   the nurse Gilmer will call if they need lab to draw    Blood culture [[id number]] Collected:  03/13/19 1152    Order Status:  Completed Specimen:  Blood Updated:  03/14/19 0115     Blood Culture, Routine No Growth to date    Narrative:       Collection has been rescheduled by [facility] at 03/13/2019 10:00 Reason:   doctors & nurses are trying to start a line  Collection has been rescheduled by [facility] at 03/13/2019 11:03 Reason:   the nurse Gilmer will call if they need lab to draw  Collection has been rescheduled by [facility] at 03/13/2019 10:00 Reason:   doctors & nurses are trying to start a line  Collection has been rescheduled by [facility] at 03/13/2019 11:03 Reason:   the nurse Gilmer will call if they need lab to draw    Gram stain [[id number]] Collected:  03/12/19 1431    Order Status:  Completed Specimen:  Body Fluid from Peritoneal Fluid Updated:  03/12/19 2214     Gram Stain Result Rare WBC's      No organisms seen    Aerobic culture [[id number]] Collected:  03/08/19 1729    Order Status:  Completed Specimen:  Body Fluid from Peritoneal Fluid Updated:  03/12/19 1014     Aerobic Bacterial Culture No growth    Culture, Anaerobe [[id number]] Collected:  03/08/19 1729    Order Status:  Completed Specimen:  Body Fluid from Peritoneal Fluid Updated:  03/12/19 0733     Anaerobic Culture Culture in progress    Blood culture [[id number]] Collected:  03/03/19 1625    Order Status:  Completed Specimen:  Blood Updated:  03/08/19 2012     Blood Culture, Routine No growth after 5 days.    Culture, Anaerobe [[id number]] Collected:  03/01/19 1542    Order Status:  Completed Specimen:  Body Fluid from Abdomen Updated:  03/08/19 0819     Anaerobic  Culture No anaerobes isolated        All pertinent labs within the past 24 hours have been reviewed.    Significant Imaging: I have reviewed all pertinent imaging results/findings within the past 24 hours.

## 2019-03-14 NOTE — PLAN OF CARE
Problem: Fall Injury Risk  Goal: Absence of Fall and Fall-Related Injury  Outcome: Ongoing (interventions implemented as appropriate)  Intervention: Identify and Manage Contributors to Fall Injury Risk  Fall risks reviewed with patient, patient verbalized understanding, bed locked and in lowest position.       Problem: Adult Inpatient Plan of Care  Goal: Plan of Care Review  Outcome: Ongoing (interventions implemented as appropriate)  Multiple BM x 13, green brown and loose on consistency, positive for occult blood, no c/o pain, schduled pain meds gioven as per order, s/p 3 units PRBC, last H&H 10.2/32.2, VSS.

## 2019-03-14 NOTE — PLAN OF CARE
Back from endo, found gastric ulcer,pt drowsy, but arouseable bp 79 sys crna gave pt 40 mcg of kendell, bp up 129 sys pulse 98

## 2019-03-14 NOTE — PLAN OF CARE
Problem: Hemodynamic Instability (Hemodialysis)  Goal: Vital Signs Remain in Desired Range  Outcome: Ongoing (interventions implemented as appropriate)  CRRT CVVHD set up per MD orders.

## 2019-03-14 NOTE — ANESTHESIA POSTPROCEDURE EVALUATION
"Anesthesia Post Evaluation    Patient: Jenni Todd    Procedure(s) Performed: Procedure(s) (LRB):  EGD (ESOPHAGOGASTRODUODENOSCOPY) (N/A)    Final Anesthesia Type: MAC  Patient location during evaluation: ICU  Level of consciousness: awake  Post-procedure vital signs: reviewed and stable  Pain management: adequate  Airway patency: patent  PONV status at discharge: No PONV  Anesthetic complications: no      Cardiovascular status: blood pressure returned to baseline  Respiratory status: spontaneous ventilation and unassisted          Visit Vitals  /71   Pulse 95   Temp 37.2 °C (98.9 °F) (Oral)   Resp 17   Ht 5' 7" (1.702 m)   Wt 97.2 kg (214 lb 4.6 oz)   LMP 01/28/2014 (Approximate)   SpO2 100%   Breastfeeding? No   BMI 33.56 kg/m²       Pain/Yuridia Score: Pain Rating Prior to Med Admin: 7 (3/13/2019  3:34 AM)        "

## 2019-03-14 NOTE — PLAN OF CARE
Sleepy, opens eyes to name. Oriented, very frequent stools, abd remains distended , taut, no nausea, no pain at this time, awaiting lab to draw blood, retried aspirating from pigtail but could not, still with dec breath sounds bases, no sob , chest pain

## 2019-03-14 NOTE — PLAN OF CARE
Dr Tan by, shown the mottling post toes and upper most foot, told has doppler pulses,      bp 97/61

## 2019-03-14 NOTE — PLAN OF CARE
Dr Guo by pt to go to surg for removal of PD cath, asked about permacath that was mentioned yest, not sure at this time

## 2019-03-14 NOTE — TRANSFER OF CARE
"Anesthesia Transfer of Care Note    Patient: Jenni Todd    Procedure(s) Performed: Procedure(s) (LRB):  REMOVAL, CATHETER, DIALYSIS, PERITONEAL (N/A)  INSERTION, CATHETER, TUNNELED ABORTED (Left)    Patient location: ICU    Anesthesia Type: MAC    Transport from OR: Transported from OR on 6-10 L/min O2 by face mask with adequate spontaneous ventilation    Post pain: adequate analgesia    Post assessment: no apparent anesthetic complications    Post vital signs: stable    Level of consciousness: awake and alert    Nausea/Vomiting: no nausea/vomiting    Complications: none    Transfer of care protocol was followed      Last vitals:   Visit Vitals  /71   Pulse 95   Temp 36.8 °C (98.2 °F)   Resp 17   Ht 5' 7" (1.702 m)   Wt 97.2 kg (214 lb 4.6 oz)   LMP 01/28/2014 (Approximate)   SpO2 100%   Breastfeeding? No   BMI 33.56 kg/m²     "

## 2019-03-14 NOTE — PROGRESS NOTES
"Vancomycin Dosing and Monitoring Pharmacy Protocol    Jenni Todd is a 48 y.o. female    Height: 5' 7" (1.702 m)   Wt Readings from Last 3 Encounters:   03/14/19 97.2 kg (214 lb 4.6 oz)   12/19/18 105.2 kg (232 lb)   09/21/18 102.1 kg (224 lb 16 oz)       Temp Readings from Last 3 Encounters:   03/14/19 98.9 °F (37.2 °C) (Oral)   12/19/18 98.9 °F (37.2 °C) (Oral)   09/24/18 97.6 °F (36.4 °C)      Lab Results   Component Value Date/Time    WBC 17.87 (H) 03/14/2019 08:19 AM    WBC 11.20 03/13/2019 06:51 AM    WBC 9.18 03/12/2019 06:54 AM      Lab Results   Component Value Date/Time    CREATININE 10.0 (H) 03/14/2019 08:18 AM    CREATININE 10.0 (H) 03/14/2019 08:18 AM    CREATININE 9.1 (H) 03/13/2019 04:45 PM      Lab Results   Component Value Date/Time    LACTATE 1.8 03/14/2019 08:19 AM    LACTATE 1.0 03/13/2019 11:18 PM    LACTATE 0.9 03/13/2019 04:45 PM    LACTATE 0.9 03/13/2019 04:45 PM       Serum creatinine: 10 mg/dL (H) 03/14/19 0818  Estimated creatinine clearance: 8.1 mL/min (A)    Antibiotics (From admission, onward)      Start     Stop Route Frequency Ordered    03/14/19 1000  vancomycin 750 mg in dextrose 5 % 250 mL IVPB (ready to mix system)      -- IV Every 48 hours (non-standard times) 03/13/19 1043    03/13/19 1045  piperacillin-tazobactam 4.5 g in dextrose 5 % 100 mL IVPB (ready to mix system)      -- IV Every 12 hours (non-standard times) 03/13/19 0943          Antifungals (From admission, onward)      Start     Stop Route Frequency Ordered    03/13/19 1100  fluconazole (DIFLUCAN) IVPB 200 mg 100 mL      -- IV Every 24 hours (non-standard times) 03/13/19 1028            Microbiology Results (last 7 days)       Procedure Component Value Units Date/Time    Blood culture [[id number]] Collected:  03/13/19 1203    Order Status:  Completed Specimen:  Blood Updated:  03/14/19 0115     Blood Culture, Routine No Growth to date    Narrative:       Collection has been rescheduled by [facility] at 03/13/2019 10:00 " Reason:   doctors & nurses are trying to start a line  Collection has been rescheduled by [facility] at 03/13/2019 11:03 Reason:   the nurse Gilmer will call if they need lab to draw  Collection has been rescheduled by [facility] at 03/13/2019 10:00 Reason:   doctors & nurses are trying to start a line  Collection has been rescheduled by [facility] at 03/13/2019 11:03 Reason:   the nurse Gilmer will call if they need lab to draw    Blood culture [[id number]] Collected:  03/13/19 1152    Order Status:  Completed Specimen:  Blood Updated:  03/14/19 0115     Blood Culture, Routine No Growth to date    Narrative:       Collection has been rescheduled by [facility] at 03/13/2019 10:00 Reason:   doctors & nurses are trying to start a line  Collection has been rescheduled by [facility] at 03/13/2019 11:03 Reason:   the nurse Gilmer will call if they need lab to draw  Collection has been rescheduled by [facility] at 03/13/2019 10:00 Reason:   doctors & nurses are trying to start a line  Collection has been rescheduled by [facility] at 03/13/2019 11:03 Reason:   the nurse Gilmer will call if they need lab to draw    Aerobic culture [[id number]] Collected:  03/12/19 1431    Order Status:  Completed Specimen:  Body Fluid from Peritoneal Fluid Updated:  03/13/19 0730     Aerobic Bacterial Culture No growth    Gram stain [[id number]] Collected:  03/12/19 1431    Order Status:  Completed Specimen:  Body Fluid from Peritoneal Fluid Updated:  03/12/19 2214     Gram Stain Result Rare WBC's      No organisms seen    Fungus culture [[id number]] Collected:  03/12/19 1431    Order Status:  Sent Specimen:  Body Fluid from Peritoneal Fluid Updated:  03/12/19 1942    Aerobic culture [[id number]] Collected:  03/08/19 1729    Order Status:  Completed Specimen:  Body Fluid from Peritoneal Fluid Updated:  03/12/19 1014     Aerobic Bacterial Culture No growth    Culture, Anaerobe [[id number]] Collected:  03/08/19 1729    Order Status:  Completed Specimen:  Body Fluid from Peritoneal Fluid Updated:   19 0733     Anaerobic Culture Culture in progress    Blood culture [[id number]] Collected:  19 1625    Order Status:  Completed Specimen:  Blood Updated:  19     Blood Culture, Routine No growth after 5 days.    Culture, Anaerobe [[id number]] Collected:  19 1542    Order Status:  Completed Specimen:  Body Fluid from Abdomen Updated:  19 0819     Anaerobic Culture No anaerobes isolated    Culture, Fluid  (Aerobic) [[id number]]  (Susceptibility) Collected:  19 1439    Order Status:  Completed Specimen:  Body Fluid from Ascites Updated:  19 1001     AEROBIC CULTURE - FLUID --     ENTEROBACTER CLOACAE  From broth only       AEROBIC CULTURE - FLUID --     STAPHYLOCOCCUS AUREUS  From broth only       Gram Stain Result Rare WBC's      No organisms seen            Indication/Target trough:   PD Cathether infection 15-20mcg/ml     Hemodialysis:   N/A    Dosing Weight:   Wt Readings from Last 1 Encounters:   19 97.2 kg (214 lb 4.6 oz)       Last Vancomycin dose: N/A   Date/Time given: N/A         Vancomycin level:  No results for input(s): VANCOMYCIN-TROUGH in the last 72 hours.  Recent Labs   Lab Result Units 19  0818   Vancomycin, Random ug/mL 23.3       Per Protocol Initial/Adjustments Dosin. Initial/Adjustment Dose: HOLD next Vancomycin dose due to elevated level and/or worsening renal function  2. Vancomycin Random Level will be drawn on 3/15 0400date/time     Pharmacy will continue to follow.    Please contact if you have any further questions. Thank you.    Milton Alegria, PharmD  [phone]

## 2019-03-14 NOTE — PLAN OF CARE
Returned from surg, drowsy but awakens and talks, oriented, ap 90 reg, lungs ess clear, no sob, 02 off, sat's 100% no co of sob or chest pain, dressings to kevin neck puncture sites both with small amt of sang drainage on them, dressings to mid and lower abd, dry and intact, abd remains , distended, less taut, still with hypoactive, bowel sounds, no further stools, denies pain, according to note, the inc were left open, and packed, co of feet pain, scd's off for now, states they are both numb, but states she does have numbness, in both feet and to lesser extent, hands, ext cold, pulses present, no changes in redness under both post toes and upper most feet takes gabapentin

## 2019-03-14 NOTE — ANESTHESIA POSTPROCEDURE EVALUATION
"Anesthesia Post Evaluation    Patient: Jenni Todd    Procedure(s) Performed: Procedure(s) (LRB):  REMOVAL, CATHETER, DIALYSIS, PERITONEAL (N/A)  INSERTION, CATHETER, TUNNELED ABORTED (Left)    Final Anesthesia Type: MAC  Patient location during evaluation: PACU  Patient participation: Yes- Able to Participate  Level of consciousness: awake and alert  Post-procedure vital signs: reviewed and not stable  Pain management: adequate  Airway patency: patent  PONV status at discharge: No PONV  Anesthetic complications: no      Cardiovascular status: blood pressure returned to baseline  Respiratory status: unassisted  Hydration status: euvolemic  Follow-up not needed.        Visit Vitals  /71   Pulse 95   Temp 36.8 °C (98.2 °F)   Resp 17   Ht 5' 7" (1.702 m)   Wt 97.2 kg (214 lb 4.6 oz)   LMP 01/28/2014 (Approximate)   SpO2 100%   Breastfeeding? No   BMI 33.56 kg/m²       Pain/Yuridia Score: Pain Rating Prior to Med Admin: 7 (3/13/2019  3:34 AM)        "

## 2019-03-14 NOTE — OP NOTE
DATE OF PROCEDURE:  03/14/2019    PREOPERATIVE DIAGNOSES:   Acute on chronic renal failure, peritonitis, GI bleed    POSTOPERATIVE DIAGNOSES:  Acute on chronic renal failure, peritonititis, GI bleed    PROCEDURE:  Attempted insertion of right internal jugular vein permanent dialysis   Catheter, attempted insertion of left internal jugular vein permanent dialysis catheter, removal of tunneled peritoneal dialysis catheter    SURGEON:  Servando Solis M.D.    ASSISTANT:  None.    ANESTHESIA:  MAC.    PREP:  Chlorhexidine.    IMPLANT: none    SPECIMEN:  None.    ESTIMATED BLOOD LOSS:  Minimal.    INDICATIONS:  The patient is an 48F with chronic peritonitis not resolving with abx who needs PD catheter removed. She currently has a left femoral line so we will plan for placement of tunneled line in the OR. The risks of the procedure were described to the patient including bleeding,   infection, pain, scarring, wound complications, potential injury to structures   in the neck or chest warranting more extensive surgery and potential need for   further interventions.  The patient demonstrated understanding of these risks   and a consent form was obtained.    PROCEDURE IN DETAIL:  The patient was identified in the Preoperative Unit and   taken back to the Operating Room and laid supine on the operating room table.    IV antibiotics were administered prior to the administration of the anesthesia.    MAC anesthesia was administered without complication.  The patient was then   prepped and draped in a standard sterile fashion.  Timeout procedure was    performed in accordance with hospital protocol.  An ultrasound was used to   identify the left internal jugular vein. It was small but compressible. The images were interpreted by me   and stored for further review.  The internal jugular vein was accessed using a   needle. The wire was not able to be passed beyond a few cm from the needle tip. US confirmed wire in the vein but  after numerous attempts we were unable to get the wire to advance even using fluoroscopic guidance.  The same procedure was used on the right. The vein was smaller but compressible where visible under US. Again we were able to aspirate blood but unable to advance the wire after numerous attempts. Bandages were applied to the bilateral neck.  Next our attention was turned to the PD catheter. A transverse incision was made in the right lower abdomen over the inferior cuff. Cautery was used to dissect this out and remove the catheter from the pelvis intact. The subcutaneous cuffs in the RUQ were dissected out using cautery and blunt dissection and the catheter removed entirely. The lower abdominal fascial defect was closed using 0 vicryl. The skin was left open. There was no sada pus but given the history of infected catheter the wounds were packed.  She was awakened from anesthesia and returned   to the ICU in stable condition.  At the end of the case,   hemostasis was confirmed and sponge, instrument and needle counts were correct   on 2 occasions.  I was present and scrubbed throughout the entirety of the case.    During all vein manipulation, the patient was in the Trendelenburg position.    COMPLICATIONS:  None.    CONDITION:  Stable.

## 2019-03-14 NOTE — PT/OT/SLP PROGRESS
Occupational Therapy  Visit Attempt     Patient Name:  Jenni Todd   MRN:  [id number]    Patient not seen today secondary to RYAN in OR at this time  . Will follow-up as available.    Alia Luna OT  3/14/2019

## 2019-03-14 NOTE — ANESTHESIA RELEASE NOTE
"Anesthesia Release from PACU Note    Patient: Jenni Todd    Procedure(s) Performed: Procedure(s) (LRB):  REMOVAL, CATHETER, DIALYSIS, PERITONEAL (N/A)  INSERTION, CATHETER, TUNNELED ABORTED (Left)    Anesthesia type: general    Post pain: Adequate analgesia    Post assessment: no apparent anesthetic complications    Last Vitals:   Visit Vitals  /71   Pulse 95   Temp 36.8 °C (98.2 °F)   Resp 17   Ht 5' 7" (1.702 m)   Wt 97.2 kg (214 lb 4.6 oz)   LMP 01/28/2014 (Approximate)   SpO2 100%   Breastfeeding? No   BMI 33.56 kg/m²       Post vital signs: stable    Level of consciousness: awake, alert  and oriented    Nausea/Vomiting: no nausea/no vomiting    Complications: none    Airway Patency: patent    Respiratory: unassisted    Cardiovascular: stable and blood pressure at baseline    Hydration: euvolemic  "

## 2019-03-14 NOTE — ASSESSMENT & PLAN NOTE
Ms. Jenni Todd is a 48 y.o. F w/ PMH of HTN, DM, PAD, Anemia of chronic disease 2/2 CKD, on PD since 2004 presenting to the ED from [facility] on 2/21/2019  for concerns of peritonitis. She has a h/o peritonitis several times in the past as per records. Patient was in her usual state of health until Sunday 2/17. Patient reported that she first had diarrhea and abdominal cramping. Patient had not been sleeping well and not feeling well since Sunday 2/17.. Patient also reported then diaphoresis, fatigue, subjective fevers and loss of appetite.     At [facility] dialysis center the PD fluid was cloudy and on 2/21 PD fluid sent for Cx from [facility] dialysis [facility] and started on vancomycin and Ceftazidime as outpatient and sent to ED for admission b/o sickness. As per verbal report by nephrology team, the peritoneal fluid cx gre Rothia species and patient was continued on IV Vanco, peritoneal ceftazdime and then on 3/1 added Cefazolin peritoneal with cefazidime till 3/2. Ceftazidime stopped 3/3. Continued on Vancomycin still as of now at time of consult as per levels, intermittenly receiving IV Vanco. Last level 3/12 vanco level was 26.8.     Patient started to feel again abdominal tenderness, looked again sick and PD fluid cx sent again on 3/1/2019 that grew Enterobacter and MSSA. Pt was started on oxacillin via peritoneum and IV bactrim from 3/5/2019 and IV vancomycin was continued. WBC levels in peritoneal fluids were decreasing and decreased to 71 on 3/9 but now have started to increase again and patient again feeling abdominal tenderness.Repeated fluid cell count 3/12/19 and is 1025 with 95% segs. Cx sent also.    Patient has finally consented and will go on HD for now. Planning to remove PD catheter on 3/13/2019 and placed catheter for HD for now.   ID consult called for further reccs for Antibiotics    Peritoneal Cx data  2/21 Rothia Species as per verbal report  3/1 Enterobacter and MSSA  3/8 Fluid Cx sent  Aerobic NGTD. No AFB seen  3/12 Fluid cx sent for aerobic/fungal/AFB=NGTD      Other cx data  2/21 Blood cx no growth  3/1 Dwight cx for Cdiff PCR negative  3/3 Blood cx no growth  3/13 Blood cx NGTD    2/26 TTE showed decreased EF of 30%, no vegetations seen. In 2/2017 EF was 60-65%    ---3/13 Sent to ICU for GI bleed, acute blood loss anemia, to get 3 units PRBCs then EGD then when stable PD cathter removal. Placed Left femoral trialysis catheter  3/14 2nd EGD done shows esophagitis, gastric non bleeding ulcer. PD catheter removed    Currently on IV Vanco intermittently as per levels, Pip tazo and Fluconazole. Off oxacillin and Bactrim  Recommendations  --- Seems like the Rothia species seen in 2/21 peritoneal fluid cx taken outpatient has been treated with ceftazidime/vanco and cefazolin  --- Started on Fluconazole and Pip-tazo for broader coverage 3/13 by primary team. On intermittent Vancomycin levels still high 23  --- Agree to continue Pip tazo that will cover Enterobacter in 3/1 fluid cx and Vancomycin that will cover MSSA. Fluid cx from 3/8 and 3/12 so far NGTD. Continue the same for now possibly soon if pt doing well can de escalate and change to just cefepime and stop pip tazo and Vanco as recommended before for 10 days after source control, PD catheter removal 3/14/2019  --- We will follow the pending blood cx and peritoneal fluid cultures results with you    ID will follow

## 2019-03-14 NOTE — PROGRESS NOTES
[facility] Cardiology Progress - Fellow Note    Consultant Attending: Wai  Consultant Fellow: Keenan Mart     Reason for Consult:     Septic shock with known chronic sCHF    Subjective:      History of Present Illness:  Jenni Todd is a 48 y.o. female who  has a past medical history of Abnormal finding on Pap smear, HPV DNA positive (2014), Anemia associated with chronic renal failure, Blood type B+, Bulging discs - symptomatic , CAD (coronary artery disease), Cardiomyopathy (3/13/2019), Diabetes mellitus, type 2, ESRD (end stage renal disease) (04/20/2004), FSGS (focal segmental glomerulosclerosis), Hyperlipidemia, Hypertension (2004), Neuropathy, Obesity, Secondary hyperparathyroidism, renal, TIA (transient ischemic attack), and Uterine fibroid.. The patient presented to the [facility] on 2/21/2019 with a primary complaint of Vomiting (48 year old female presents to ed cc of abdominal pain with diarrhea that began on sunday and emesis today. patient is pd patient last pd exchange was yesterday full treamtent took antibiotic pd treatment today. )    Pt is being treated for peritonitis from PD site. She is now being treated for septic and hypovolemic shock 2/2 upper GI bleed. She had hematemesis overnight, her BP dropped to 90/40s which responded to 250ml bolus of LR. Her Hb dropped over 2 points overnight on 3/13 to Hb 6. She received 2 units PRBCs 3/13 then the plan is EGD. She denies CP, SOB, palpitations.    Past Medical History:  Past Medical History:   Diagnosis Date    Abnormal finding on Pap smear, HPV DNA positive 2014    Anemia associated with chronic renal failure     on Epogen    Blood type B+     Bulging discs - symptomatic      CAD (coronary artery disease)     Cardiomyopathy 3/13/2019    Diabetes mellitus, type 2     ESRD (end stage renal disease) 04/20/2004    FSGS (focal segmental glomerulosclerosis)     with collapsing    Hyperlipidemia     Hypertension 2004     Neuropathy     Obesity     Secondary hyperparathyroidism, renal     TIA (transient ischemic attack)     Uterine fibroid     small uterine        Past Surgical History:  Past Surgical History:   Procedure Laterality Date    CARDIAC CATHETERIZATION      PCI x 2     SECTION, CLASSIC      COLONOSCOPY N/A 2018    Performed by Rodrigue Russell [facility] at [facility] (2ND FLR)    DEBRIDEMENT-WOUND Left 2016    Performed by Teressa Maddox MD at [facility] OR    DIALYSIS FISTULA CREATION      multiple fistulas and grafts before PD     EGD (ESOPHAGOGASTRODUODENOSCOPY) N/A 3/13/2019    Performed by [facility] ENDO    INCISION AND DRAINAGE (I&D), LABIAL N/A 2016    Performed by Harmony Fernandez MD at [facility] OR    INCISION AND DRAINAGE (I&D), LABIAL (ADD ON) Left 2016    Performed by Teressa Maddox MD at [facility] OR    INCISION AND DRAINAGE OF WOUND Left     VULVAR ABCESS WITH NECROSIS    ORIF, HIP Left 2018    Performed by Tevin Grullon MD at [facility] OR    PERITONEAL CATHETER INSERTION      PERMCATH REWIRE- TUNNELED CATH REWIRE Left 2017    Performed by Baldev Munroe MD at [facility] CATH LAB    PERMCATH REWIRE- TUNNELED CATH REWIRE N/A 10/5/2017    Performed by Baldev Munroe MD at [facility] CATH LAB    UMBILICAL HERNIA REPAIR         Allergies:  Review of patient's allergies indicates:   Allergen Reactions    Clindamycin Diarrhea    Flagyl [metronidazole hcl]     Metronidazole        Medications:   In-Hospital Scheduled Medications:   atorvastatin  40 mg Oral QHS    calcitRIOL  0.5 mcg Oral QAM    cinacalcet  90 mg Oral QHS    epoetin adonay (PROCRIT) injection  20,000 Units Subcutaneous Every Mon, Wed, Fri    fluconazole (DIFLUCAN) IVPB  200 mg Intravenous Q24H    Lactobacillus acidoph-L.bulgar  4 tablet Oral TID WM    metoclopramide HCl  10 mg Intravenous Q8H    pantoprazole  40 mg Intravenous BID    piperacillin-tazobactam 4.5 g in dextrose 5 % 100 mL  IVPB (ready to mix system)  4.5 g Intravenous Q12H    potassium chloride  20 mEq Oral Daily    prochlorperazine  10 mg Intravenous Q8H    ramelteon  8 mg Oral QHS    sevelamer carbonate  800 mg Oral TID WM    sodium bicarbonate  1,300 mg Oral BID    sucralfate  1 g Oral QID (AC & HS)    vancomycin (VANCOCIN) IVPB  750 mg Intravenous Q48H    vitamin renal formula (B-complex-vitamin c-folic acid)  1 capsule Oral Daily      In-Hospital PRN Medications:  sodium chloride, aluminum-magnesium hydroxide-simethicone, dextrose 50%, dextrose 50%, glucagon (human recombinant), glucose, glucose, heparin (porcine), HYDROmorphone, insulin aspart U-100, magnesium sulfate IVPB, ondansetron, promethazine, simethicone, sodium chloride 0.9%, sodium phosphate IVPB, sodium phosphate IVPB, sodium phosphate IVPB   In-Hospital IV Infusion Medications:   sodium chloride 0.9%        Home Medications:  Prior to Admission medications    Medication Sig Start Date End Date Taking? Authorizing Provider   amLODIPine (NORVASC) 5 MG tablet Take 1 tablet (5 mg total) by mouth once daily. 9/23/18 9/23/19 Yes Samara Minor MD   aspirin (ECOTRIN) 81 MG EC tablet Take 1 tablet (81 mg total) by mouth once daily. 1/16/17  Yes Calos Yousif MD   atorvastatin (LIPITOR) 40 MG tablet Take 40 mg by mouth every evening.    Yes Historical Provider, MD   calcitRIOL (ROCALTROL) 0.5 MCG Cap Take 1 capsule by mouth every morning.    Yes Historical Provider, MD   carvedilol (COREG) 3.125 MG tablet Take 1 tablet (3.125 mg total) by mouth 2 (two) times daily. 9/22/18 9/22/19 Yes Samara Minor MD   cinacalcet (SENSIPAR) 90 MG Tab Take 1 tablet by mouth every evening.    Yes Historical Provider, MD   clopidogrel (PLAVIX) 75 mg tablet Take 1 tablet (75 mg total) by mouth once daily. 1/30/19 1/30/20 Yes Gurvinder Watt MD   epoetin adonay 10,000 unit/mL Soln 1 mL, epoetin adonay 20,000 unit/mL Soln 1 mL Inject 30,000 Units into the vein every 14 (fourteen) days.    Yes Historical Provider, MD   gabapentin (NEURONTIN) 100 MG capsule 1 capsule 3 (three) times daily. 6/6/18  Yes Historical Provider, MD   heparin sodium,porcine (HEPARIN, PORCINE,) 5,000 unit/mL injection Inject 1.6 mLs (8,000 Units total) into the skin every 12 (twelve) hours. 9/17/18  Yes Nancy Purcell MD   HUMULIN R REGULAR U-100 INSULN 100 unit/mL injection Inject 200 units into dialysis bag every evening. 2/23/18  Yes Historical Provider, MD   nateglinide (STARLIX) 120 MG tablet STARLIX 120MG 30 MINUTES BEFORE EACH MEAL.   Yes Historical Provider, MD   ONETOUCH VERIO Strp USE 1 STRIP ONCE DAILY IN VITRO 2/24/17  Yes Historical Provider, MD   PLAVIX 75 mg tablet TAKE 1 BY MOUTH DAILY  Patient taking differently: TAKE 1 BY MOUTH EVERY MORNING 8/11/17  Yes Michael Tan III, MD   sevelamer carbonate (RENVELA) 800 mg Tab Take 3 to 4 tablets by mouth twice daily as needed 3/6/18  Yes Historical Provider, MD   vitamin renal formula, B-complex-vitamin c-folic acid, (B COMPLEX-C-FOLIC ACID) 1 mg Cap Take 1 capsule by mouth once daily. 1 Capsule Oral Every day   Yes Historical Provider, MD   gabapentin (NEURONTIN) 300 MG capsule TAKE 3 CAPSULES (900 MG TOTAL) BY MOUTH 3 (THREE) TIMES A DAY. 3/1/19   Jewel Saenz MD       Family History:  Family History   Problem Relation Age of Onset    Cancer Maternal Grandmother     Cancer Paternal Grandfather     Diabetes Maternal Aunt     Diabetes Paternal Aunt     Kidney disease Neg Hx     Heart disease Neg Hx     Ovarian cancer Neg Hx     Breast cancer Neg Hx        Social History:  Social History     Tobacco Use    Smoking status: Never Smoker    Smokeless tobacco: Never Used   Substance Use Topics    Alcohol use: No     Comment: Pt reports some social use of about 1-2 drinks about every six months.    Drug use: No       Review of Systems:  Pertinent items are noted in HPI. All other systems are reviewed and are negative.     Objective:   Last 24 Hour Vital  "Signs:  BP  Min: 90/52  Max: 147/63  Temp  Av.4 °F (36.9 °C)  Min: 97.8 °F (36.6 °C)  Max: 98.9 °F (37.2 °C)  Pulse  Av.8  Min: 92  Max: 116  Resp  Avg: 15.8  Min: 7  Max: 29  SpO2  Av.4 %  Min: 92 %  Max: 100 %  Weight  Av.2 kg (214 lb 4.6 oz)  Min: 97.2 kg (214 lb 4.6 oz)  Max: 97.2 kg (214 lb 4.6 oz)  I/O last 3 completed shifts:  In: 1601.3 [P.O.:220; I.V.:363; Blood:718.3; IV Piggyback:300]  Out:  [Urine:1; Emesis/NG output:1775; Stool:250]  Body mass index is 33.56 kg/m².    Physical Examination:    /61   Pulse 99   Temp 98.9 °F (37.2 °C) (Oral)   Resp 17   Ht 5' 7" (1.702 m)   Wt 97.2 kg (214 lb 4.6 oz)   LMP 2014 (Approximate)   SpO2 100%   Breastfeeding? No   BMI 33.56 kg/m²     General Appearance:    Alert, cooperative, no distress, appears stated age   Head:    Normocephalic, without obvious abnormality, atraumatic   Eyes:    PERRL, conjunctiva/corneas clear, EOM's intact, fundi     benign, both eyes   Ears:    Normal TM's and external ear canals, both ears   Nose:   Nares normal, septum midline, mucosa normal, no drainage    or sinus tenderness   Throat:   Lips, mucosa, and tongue normal; teeth and gums normal   Neck:   Supple, symmetrical, trachea midline, no adenopathy;     thyroid:  no enlargement/tenderness/nodules; no carotid    bruit or JVD   Back:     Symmetric, no curvature, ROM normal, no CVA tenderness   Lungs:     Clear to auscultation bilaterally, respirations unlabored   Chest Wall:    No tenderness or deformity    Heart:    Regular rate and rhythm, S1 and S2 normal, no murmur, rub   or gallop   Breast Exam:    No tenderness, masses, or nipple abnormality   Abdomen:     Soft, non-tender, bowel sounds active all four quadrants,     no masses, no organomegaly   Genitalia:    Normal female without lesion, discharge or tenderness   Rectal:    Normal tone, no masses or tenderness;    guaiac negative stool   Extremities:   Extremities normal, " atraumatic, no cyanosis or edema   Pulses:   2+ and symmetric all extremities   Skin:   Skin color, texture, turgor normal, no rashes or lesions   Lymph nodes:   Cervical, supraclavicular, and axillary nodes normal   Neurologic:   CNII-XII intact, normal strength, sensation and reflexes     throughout         Laboratory Results:  Most Recent Data:  CBC:   Lab Results   Component Value Date    WBC 17.87 (H) 03/14/2019    HGB 8.1 (L) 03/14/2019    HGB 8.1 (L) 03/14/2019    HCT 25.5 (L) 03/14/2019    HCT 25.5 (L) 03/14/2019     03/14/2019    MCV 90 03/14/2019    RDW 20.0 (H) 03/14/2019     BMP:   Lab Results   Component Value Date     (L) 03/14/2019     (L) 03/14/2019    K 4.3 03/14/2019    K 4.3 03/14/2019    CL 92 (L) 03/14/2019    CL 92 (L) 03/14/2019    CO2 27 03/14/2019    CO2 27 03/14/2019    BUN 71 (H) 03/14/2019    BUN 71 (H) 03/14/2019    CREATININE 10.0 (H) 03/14/2019    CREATININE 10.0 (H) 03/14/2019    GLU 96 03/14/2019    GLU 96 03/14/2019    CALCIUM 7.4 (L) 03/14/2019    CALCIUM 7.4 (L) 03/14/2019    MG 1.8 03/14/2019    PHOS 5.8 (H) 03/14/2019     LFTs:   Lab Results   Component Value Date    PROT 5.5 (L) 03/14/2019    ALBUMIN 1.7 (L) 03/14/2019    ALBUMIN 1.7 (L) 03/14/2019    BILITOT 0.4 03/14/2019    AST 20 03/14/2019    ALKPHOS 92 03/14/2019    ALT <5 (L) 03/14/2019     Coags:   Lab Results   Component Value Date    INR 1.7 (H) 03/13/2019     FLP:   Lab Results   Component Value Date    CHOL 199 02/24/2017    HDL 19 (L) 02/24/2017    LDLCALC Invalid, Trig>400.0 02/24/2017    TRIG 460 (H) 02/24/2017    CHOLHDL 9.5 (L) 02/24/2017     DM:   Lab Results   Component Value Date    HGBA1C 6.3 (H) 09/12/2018    HGBA1C 7.9 (H) 11/30/2017    HGBA1C 9.0 (H) 02/22/2017    LDLCALC Invalid, Trig>400.0 02/24/2017    CREATININE 10.0 (H) 03/14/2019    CREATININE 10.0 (H) 03/14/2019     Thyroid:   Lab Results   Component Value Date    TSH 2.212 02/24/2017    FREET4 1.03 07/06/2015     Anemia:   Lab  Results   Component Value Date    IRON 19 (L) 09/15/2018    TIBC 182 (L) 09/15/2018    FERRITIN 1,920 (H) 04/16/2016    XOHHECJU72 1017 (H) 02/24/2017    FOLATE 13.3 02/24/2017     Cardiac:   Lab Results   Component Value Date    TROPONINI 0.122 (H) 03/13/2019     (H) 03/13/2019     Urinalysis: No results found for: LABURIN, COLORU, CLARITYU, SPECGRAV, LABSPEC, NITRITE, PROTEINUR, GLUCOSEU, KETONESU, UROBILINOGEN, BILIRUBINUR, BLOODU    Trended Lab Data:  Recent Labs   Lab 03/12/19  0654 03/13/19  0651  03/13/19  0918 03/13/19  1645 03/13/19  2318 03/14/19  0818 03/14/19  0819   WBC 9.18 11.20  --   --   --   --   --  17.87*   HGB 8.7* 6.0*   < >  --  7.3* 10.2*  --  8.1*  8.1*   HCT 28.5* 19.9*   < >  --  23.0* 32.2*  --  25.5*  25.5*   * 402*  --   --   --   --   --  311   MCV 98 98  --   --   --   --   --  90   RDW 17.4* 17.8*  --   --   --   --   --  20.0*   *  131* 129*  129*  --  129* 131*  --  133*  133*  --    K 3.5  3.6 4.0  3.9  --  4.3 4.3  --  4.3  4.3  --    CL 88*  89* 88*  88*  --  89* 91*  --  92*  92*  --    CO2 28  28 24  24  --  22* 28  --  27  27  --    BUN 30*  29* 51*  50*  --  51* 60*  --  71*  71*  --    CREATININE 7.8*  7.8* 8.7*  8.7*  --  8.9* 9.1*  --  10.0*  10.0*  --    *  143* 135*  134*  --  160* 91  --  96  96  --    PROT 6.6 5.3*  --  5.4* 5.1*  --  5.5*  --    ALBUMIN 1.8*  1.8* 1.6*  1.5*  --  1.6* 1.6*  --  1.7*  1.7*  --    BILITOT 0.3 0.2  --  0.2 0.6  --  0.4  --    AST 16 13  --  15 26  --  20  --    ALKPHOS 131 98  --  101 93  --  92  --    ALT <5* <5*  --  <5* <5*  --  <5*  --     < > = values in this interval not displayed.       Trended Cardiac Data:  Recent Labs   Lab 03/13/19  0918   TROPONINI 0.122*   *       Other Results:  EKG (my interpretation): sinus tachycardia, TWI in the lateral leads c/w ischemia    Radiology:  Ct Abdomen Without Contrast    Result Date: 3/3/2019  EXAMINATION: CT ABDOMEN WITHOUT  CONTRAST CLINICAL HISTORY: peritonitis; TECHNIQUE: 5 mm axial images were obtained through the abdomen without the use of intravenous contrast. COMPARISON: CTA abdomen and pelvis dated 09/20/2018 FINDINGS: Limited images through the lower chest demonstrate streaky bibasilar opacities, likely atelectasis.  There is partially visualized coronary calcific atherosclerosis.  Stable right diaphragmatic calcification.  There is a 1.1 cm nodular density within the outer left breast (series 2, image 9). Abdomen and pelvis: The liver, spleen, pancreas, and adrenal glands have a normal noncontrast appearance.  The gallbladder is decompressed.  Bilateral native kidneys are atrophic with worsening perinephric stranding and fullness of the right lower pole (for example series 601, image 91).  Low-attenuation lesions within the left kidney, possible cysts though technically indeterminate.  There is extensive abdominovisceral and peritoneal calcification with moderate volume ascites noting partially visualized peritoneal dialysis catheter.  Visualized GI tract is normal in caliber.  Similar, prominent retroperitoneal nodes, nonspecific and possibly reactive.  Lower pelvic structures were not imaged.  No aggressive osseous lesion.     Increased perinephric stranding and soft tissue fullness of the right renal lower pole, possibly infectious/inflammatory though underlying neoplasm not excluded.  Contrast enhanced exam recommended.  If unable to obtain, dedicated ultrasound suggested. Nodular density in the left outer breast.  Correlate with mammographic history. Extensive abdominovisceral and peritoneal calcification, similar. Intra-abdominal ascites with peritoneal dialysis catheter. This report was flagged in Epic as abnormal. Electronically signed by: Fantasma Kemp Date:    03/03/2019 Time:    17:45    X-ray Chest 1 View    Result Date: 3/13/2019  EXAMINATION: XR CHEST 1 VIEW CLINICAL HISTORY: attempted line placement;  TECHNIQUE: Single frontal view of the chest was performed. COMPARISON: None FINDINGS: Mild cardiomegaly.  The lungs are clear.  No pleural effusion     See above Electronically signed by: Heraclio Timmons MD Date:    03/13/2019 Time:    12:03    X-ray Chest 1 View    Result Date: 3/13/2019  EXAMINATION: XR CHEST 1 VIEW CLINICAL HISTORY: sob; TECHNIQUE: Single frontal view of the chest was performed. COMPARISON: Chest radiograph from 12/01/2017 FINDINGS: No cardiomegaly.  Atherosclerosis of the aortic arch.  Normal pulmonary vasculature.  Minimal subsegmental atelectasis of the lower lung zones bilaterally.  Lungs are otherwise clear.  No pleural effusion.  No pneumothorax.  Osseous structures are unremarkable.     No acute abnormality. Electronically signed by: Jewel Powell MD Date:    03/13/2019 Time:    10:40    X-ray Abdomen Ap 1 View    Result Date: 3/13/2019  EXAMINATION: XR ABDOMEN AP 1 VIEW CLINICAL HISTORY: (L) femoral line placement; TECHNIQUE: AP View(s) of the abdomen was performed. COMPARISON: Plain film from 03/10/2019 FINDINGS: Left-sided femoral line catheter is visualized with its tip in the region of the left common iliac vein.  No dilated loops of small bowel.  Osseous structures are unremarkable.     As above. Electronically signed by: Jewel Powell MD Date:    03/13/2019 Time:    12:11    X-ray Abdomen Portable    Result Date: 3/11/2019  EXAMINATION: ABDOMEN PORTABLE CLINICAL HISTORY: vomiting; TECHNIQUE: Abdomen AP portable COMPARISON: 02/22/2019 FINDINGS: Three 3 screws are seen through the left femoral neck.  There is a right sided catheter for dialysis.  Single AP view of the abdomen  demonstrates a nonspecific bowel gas pattern. No dilated loops small bowel or air-fluid levels are detected. There is a prominent loop of a loop of air-filled transverse colon.  Advanced vascular calcifications are present.  Bones are diffusely osteopenic.     Nonobstructive gas.  Bowel gas pattern.  Electronically signed by: Salma Neely Date:    03/11/2019 Time:    02:52    Us Kidney    Result Date: 3/4/2019  EXAMINATION: US KIDNEY CLINICAL HISTORY: right renal mass? peritonitis; End stage renal disease TECHNIQUE: Ultrasound of the kidneys was performed including color flow and Doppler evaluation of the kidneys. COMPARISON: None. FINDINGS: Right kidney: Small measuring 9 x 4 x 4 cm and demonstrates cortical thinning and increased cortical echogenicity with loss of corticomedullary differentiation.  No identifiable perfusion.  No discrete masses.  No definite hydronephrosis. Left kidney:  Small measuring 7 x 3 x 4 cm and demonstrates cortical thinning and increased cortical echogenicity with loss of corticomedullary differentiation. No identifiable perfusion. No discrete masses. No definite hydronephrosis.     Sonographic findings in keeping with the patient's known history of ESRD. Electronically signed by: Mauricio Trinidad MD Date:    03/04/2019 Time:    16:16    X-ray Kub    Result Date: 2/23/2019  EXAMINATION: XR KUB CLINICAL HISTORY: peritonitis;End stage renal disease TECHNIQUE: Single AP supine view of the abdomen (KUB) was performed COMPARISON: None FINDINGS: There is some bowel gas likely in the transverse colon.  Remainder the abdomen is essentially gasless.  Significant vascular calcifications noted.  Postop change of left hip and degenerative changes at both hips.  Some air in the descending colon.     Nonobstructive bowel gas pattern.  There are extensive vascular calcifications.  Abdomen is somewhat opaque which may represent ascites.  Correlation with clinical findings would be helpful. Electronically signed by: Adolph Cooper MD Date:    02/23/2019 Time:    07:37     Assessment:     Jenni Todd is a 48 y.o. female with:  Patient Active Problem List    Diagnosis Date Noted    Cardiomyopathy 03/13/2019    Nausea and vomiting     Hypotension due to hypovolemia     Occult GI bleeding  03/01/2019    Abdominal pain     Peritonitis associated with peritoneal dialysis 02/21/2019    Peritonitis due to infected peritoneal dialysis catheter 02/21/2019    PAD (peripheral artery disease) 10/24/2016    Gait abnormality 12/23/2015    Chronic low back pain 12/23/2015    DDD (degenerative disc disease), lumbar 12/23/2015    Cerebral infarction due to embolism of middle cerebral artery 08/13/2015    Hypoalbuminemia 07/14/2015    Hypertensive renal disease 12/10/2014    Spinal stenosis of sacral and sacrococcygeal region 07/23/2014    Bulging discs - symptomatic  05/16/2014    Awaiting organ transplant status 02/19/2013    Neuropathy     FSGS (focal segmental glomerulosclerosis) biopsy proven with collapsing features     Anemia in chronic kidney disease, on chronic dialysis     Hypertension     Hyperlipidemia     Obesity     CAD (coronary artery disease) with recent PCI 12/18/2012     Diabetes mellitus, type 2     ESRD (end stage renal disease) on PD ( initiated dialysis 04/20/2004) 04/20/2004     NSTEMI type 2 2/2 hypovolemia from acute blood loss anemia   Peritonitis  sCHF (EF30) 2/2 ICM  CAD s/p PCI in 2012  ESRD on PD at home     Recommendations:     -she is not fluid overloaded on exam  -would bolus with IVF if she becomes unstable  -transfuse to keep Hb > 8 in setting of known CAD  -nephrology switching her to HD  and will manage her fluids since she is not making urine.  -strict I/Os  -daily weights  -monitor on telemetry  -would hold all GDMT (ASA, statin, BB, ACE/ARB) until her acute issues resolve  -rest per other specialties and primary team  -no further cardiology recs at this time  -please call with questions    Keenan Mart MD  [employer] Cardiology PGY-4

## 2019-03-14 NOTE — ANESTHESIA PREPROCEDURE EVALUATION
03/13/2019    Jenni Todd is a 48 y.o. female  with ESRD on peritoneal dialysis; infection requiring remove of PD catheter.    PRIOR ANES (in Epic)   20190313 EGD MAC    etom 24, prop 100 VSS ()  [id number] I&D_Labia GA    [mid 2, fentr 100, prop 150 ]    [sevo, phenyleph 200] VSS     [ easy mask vent; ETT LoPro3, Grde I]    ANES-RELATED MED/SURG [id number]  KCl metoclopramide-IVq8 pip-tazo  NaHCO3 pantoprazole-IV  vanc-IVq48    Patient Active Problem List   Diagnosis    Neuropathy    ESRD (end stage renal disease) on PD ( initiated dialysis 04/20/2004)    FSGS (focal segmental glomerulosclerosis) biopsy proven with collapsing features    Anemia in chronic kidney disease, on chronic dialysis    Hypertension    Hyperlipidemia    Obesity    CAD (coronary artery disease) with recent PCI 12/18/2012    Diabetes mellitus, type 2    Awaiting organ transplant status    Bulging discs - symptomatic     Spinal stenosis of sacral and sacrococcygeal region    Hypertensive renal disease    Hypoalbuminemia    Cerebral infarction due to embolism of middle cerebral artery    Gait abnormality    Chronic low back pain    DDD (degenerative disc disease), lumbar    PAD (peripheral artery disease)    Peritonitis associated with peritoneal dialysis    Peritonitis due to infected peritoneal dialysis catheter    Abdominal pain    Occult GI bleeding    Hypotension due to hypovolemia    Nausea and vomiting    Cardiomyopathy       Von Willebrand's Disease     Factor V Leiden Disease      Past Medical History:   Diagnosis Date    Abnormal finding on Pap smear, HPV DNA positive 2014    Anemia associated with chronic renal failure     on Epogen    Blood type B+     Bulging discs - symptomatic      CAD (coronary artery disease)     Cardiomyopathy 3/13/2019    Diabetes mellitus, type 2      ESRD (end stage renal disease) 2004    FSGS (focal segmental glomerulosclerosis)     with collapsing    Hyperlipidemia     Hypertension     Neuropathy     Obesity     Secondary hyperparathyroidism, renal     TIA (transient ischemic attack)     Uterine fibroid     small uterine      Past Surgical History:   Procedure Laterality Date    CARDIAC CATHETERIZATION      PCI x 2     SECTION, CLASSIC      COLONOSCOPY N/A 2018    Performed by Rodrigue Russell [facility] (2ND FLR)    DEBRIDEMENT-WOUND Left 2016    Performed by [facility]ressa Maddox [facility] at [facility] OR    DIALYSIS FISTULA CREATION      multiple fistulas and grafts before PD     INCISION AND DRAINAGE (I&D), LABIAL N/A 2016    Performed by Harmony Fernandez MD at [facility] OR    INCISION AND DRAINAGE (I&D), LABIAL (ADD ON) Left 2016    Performed by Teressa Maddox MD at [facility] OR    INCISION AND DRAINAGE OF WOUND Left     VULVAR ABCESS WITH NECROSIS    ORIF, HIP Left 2018    Performed by Tevin Grullon MD at [facility] OR    PERITONEAL CATHETER INSERTION      PERMCATH REWIRE- TUNNELED CATH REWIRE Left 2017    Performed by Baldev Munroe MD at [facility] CATH LAB    PERMCATH REWIRE- TUNNELED CATH REWIRE N/A 10/5/2017    Performed by Baldev Munroe MD at [facility] CATH LAB    UMBILICAL HERNIA REPAIR       ALLERGIES 2019  Review of patient's allergies indicates:   Allergen Reactions    Clindamycin Diarrhea    Flagyl [metronidazole hcl]     Metronidazole        ANES-RELATED MAR [id number]                Anesthesia Evaluation      I have reviewed the Medications.     Review of Systems  Anesthesia Hx:  No problems with previous Anesthesia History of prior surgery of interest to airway management or planning: Denies Family Hx of Anesthesia complications.   Denies Personal Hx of Anesthesia complications.   Social:  Non-Smoker    Hematology/Oncology:     Oncology Normal    -- Anemia: Anemia of Chronic Kidney Disease   Chronic  Hematology Comments: H/H 8.7/28    EENT/Dental:EENT/Dental Normal   Cardiovascular:   Exercise tolerance: poor Hypertension, well controlled Past MI CAD  CABG/stent (BMS 2012)   Denies Angina. CHF         PVD ECG has been reviewed. TTE: EF 30%, G2DD, Mild aortic valve stenosis. ARNOLD 1.68 cm2; peak velocity is 2.28 m/s; mean gradient is 12 mmHg.    EKG: NSR    Stents 2005   Pulmonary:  Pulmonary Normal    Renal/:   Chronic Renal Disease (on PD x12 years), ESRD Peritomeal dialysys    Dilutional hyochloremic hyponatremia Na 130    Corrected Ca wnl   Hepatic/GI:  Hepatic/GI Normal    Musculoskeletal:   Arthritis   Spine Disorders: (Spinal stenosis) lumbar Chronic Pain and Disc disease    Neurological:   TIA, CVA, no residual symptoms Headaches Uses scooter to get around  Peripheral Neuropathy    Endocrine:   Diabetes, well controlled, using insulin A1C 6.3   Dermatological:  Skin Normal    Psych:  Psychiatric Normal         Wt Readings from Last 1 Encounters:   03/13/19 99.7 kg (219 lb 12.8 oz)     Temp Readings from Last 1 Encounters:   03/13/19 37.1 °C (98.7 °F) (Oral)     BP Readings from Last 1 Encounters:   03/13/19 109/63     Pulse Readings from Last 1 Encounters:   03/13/19 (!) 113     SpO2 Readings from Last 1 Encounters:   03/13/19 100%       Physical Exam  General:  Obesity    Airway/Jaw/Neck:  Airway Findings: Mouth Opening: Normal Tongue: Normal  General Airway Assessment: Adult  Mallampati: IV  TM Distance: Normal, at least 6 cm  Jaw/Neck Findings:     Neck ROM: Normal ROM      Dental:  Dental Findings: In tact   Chest/Lungs:  Chest/Lungs Findings: Clear to auscultation, Normal Respiratory Rate     Heart/Vascular:  Heart Findings: Rate: Normal  Rhythm: Regular Rhythm  Sounds: Normal     Abdomen:  Abdomen Findings:  Normal, Soft, Tenderness          Lab Results   Component Value Date    WBC 11.20 03/13/2019    HGB 7.3 (L) 03/13/2019    HCT 23.0 (L) 03/13/2019    MCV 98 03/13/2019     (H)  03/13/2019        Chemistry        Component Value Date/Time     (L) 03/13/2019 1645    K 4.3 03/13/2019 1645    CL 91 (L) 03/13/2019 1645    CO2 28 03/13/2019 1645    BUN 60 (H) 03/13/2019 1645    CREATININE 9.1 (H) 03/13/2019 1645    GLU 91 03/13/2019 1645        Component Value Date/Time    CALCIUM 7.1 (L) 03/13/2019 1645    ALKPHOS 93 03/13/2019 1645    AST 26 03/13/2019 1645    ALT <5 (L) 03/13/2019 1645    BILITOT 0.6 03/13/2019 1645    ESTGFRAFRICA 5 (A) 03/13/2019 1645    EGFRNONAA 5 (A) 03/13/2019 1645          Lab Results   Component Value Date    ALBUMIN 1.6 (L) 03/13/2019      Lab Results   Component Value Date    TSH 2.212 02/24/2017      Lab Results   Component Value Date    APTT 26.3 09/19/2018      Lab Results   Component Value Date    INR 1.7 (H) 03/13/2019       CXR [id number]  Mild cardiomegaly.  The lungs are clear.  No pleural effusion    EKG  inus tachycardia with Fusion complexes  Possible Anterior infarct ,age undetermined  ST and T wave abnormality, consider lateral ischemia  Abnormal ECG  When compared with ECG of 20-SEP-2018 00:36,  Fusion complexes are now Present  Nonspecific T wave abnormality now evident in Inferior leads  T wave inversion now evident in Lateral leads  Confirmed     ECHO [id number]  · Moderately decreased left ventricular systolic function. The estimated ejection fraction is 30%  · Grade II (moderate) left ventricular diastolic dysfunction consistent with pseudonormalization.  · Elevated left atrial pressure.  · Normal right ventricular systolic function.  · Mild aortic valve stenosis.  · Aortic valve area is 1.68 cm2; peak velocity is 2.28 m/s; mean gradient is 12 mmHg.  · Normal central venous pressure (3 mm Hg).  · The estimated PA systolic pressure is 27 mm Hg        Anesthesia Plan  Type of Anesthesia, risks & benefits discussed:  Anesthesia Type:  MAC, general  Patient's Preference:   Intra-op Monitoring Plan:   Intra-op Monitoring Plan Comments:   Post Op Pain  Control Plan:   Post Op Pain Control Plan Comments:   Induction:   IV  Beta Blocker:  Patient is on a Beta-Blocker and has received one dose within the past 24 hours (No further documentation required).       Informed Consent: Patient understands risks and agrees with Anesthesia plan.  Questions answered. Anesthesia consent signed with patient.  ASA Score: 4     Day of Surgery Review of History & Physical:     H&P completed by Anesthesiologist.       Ready For Surgery From Anesthesia Perspective.

## 2019-03-14 NOTE — PT/OT/SLP PROGRESS
Physical Therapy  Eval Attempts    Patient Name:  Jenni Todd   MRN:  [id number]    Patient not seen today secondary to pt RYAN in endo during 1000 attempt and RYAN in OR during 1300 attempt. Will follow-up as able.    Radha Stephens, PT   3/14/2019

## 2019-03-14 NOTE — TRANSFER OF CARE
"Anesthesia Transfer of Care Note    Patient: Jenni Todd    Procedure(s) Performed: Procedure(s) (LRB):  EGD (ESOPHAGOGASTRODUODENOSCOPY) (N/A)    Patient location: ICU    Anesthesia Type: MAC    Transport from OR: Transported from OR on 2-3 L/min O2 by NC with adequate spontaneous ventilation    Post pain: adequate analgesia    Post assessment: no apparent anesthetic complications and tolerated procedure well    Post vital signs: stable    Level of consciousness: awake, alert and oriented    Nausea/Vomiting: no nausea/vomiting    Complications: none    Transfer of care protocol was followed      Last vitals:   Visit Vitals  /71   Pulse 95   Temp 37.2 °C (98.9 °F) (Oral)   Resp 17   Ht 5' 7" (1.702 m)   Wt 97.2 kg (214 lb 4.6 oz)   LMP 01/28/2014 (Approximate)   SpO2 100%   Breastfeeding? No   BMI 33.56 kg/m²     "

## 2019-03-14 NOTE — ANESTHESIA PREPROCEDURE EVALUATION
03/14/2019    Jenni Todd is a 48 y.o. female  in ICU with GIB, needs EGD; with ESRD on peritoneal dialysis; needs remove of PD catheter. EF 30%. Had EGD yesterday with etomidate and propofol experienced hypotension     PRIOR ANES (in Epic)   [id number] EGD MAC    etom 24, prop 100 VSS ()  [id number] I&D_Labia GA    [mid 2, fentr 100, prop 150 ]    [sevo, phenyleph 200] VSS     [ easy mask vent; ETT LoPro3, Grde I]    ANES-RELATED MED/SURG [id number]  KCl metoclopramide-IVq8 pip-tazo  NaHCO3 pantoprazole-IV  vanc-IVq48    Patient Active Problem List   Diagnosis    Neuropathy    ESRD (end stage renal disease) on PD ( initiated dialysis 04/20/2004)    FSGS (focal segmental glomerulosclerosis) biopsy proven with collapsing features    Anemia in chronic kidney disease, on chronic dialysis    Hypertension    Hyperlipidemia    Obesity    CAD (coronary artery disease) with recent PCI 12/18/2012    Diabetes mellitus, type 2    Awaiting organ transplant status    Bulging discs - symptomatic     Spinal stenosis of sacral and sacrococcygeal region    Hypertensive renal disease    Hypoalbuminemia    Cerebral infarction due to embolism of middle cerebral artery    Gait abnormality    Chronic low back pain    DDD (degenerative disc disease), lumbar    PAD (peripheral artery disease)    Peritonitis associated with peritoneal dialysis    Peritonitis due to infected peritoneal dialysis catheter    Abdominal pain    Occult GI bleeding    Hypotension due to hypovolemia    Nausea and vomiting    Cardiomyopathy       Von Willebrand's Disease     Factor V Leiden Disease      Past Medical History:   Diagnosis Date    Abnormal finding on Pap smear, HPV DNA positive 2014    Anemia associated with chronic renal failure     on Epogen    Blood type B+     Bulging discs - symptomatic       CAD (coronary artery disease)     Cardiomyopathy 3/13/2019    Diabetes mellitus, type 2     ESRD (end stage renal disease) 04/20/2004    FSGS (focal segmental glomerulosclerosis)     with collapsing    Hyperlipidemia     Hypertension 2004    Neuropathy     Obesity     Secondary hyperparathyroidism, renal     TIA (transient ischemic attack)     Uterine fibroid     small uterine        ALLERGIES [id number]  Review of patient's allergies indicates:   Allergen Reactions    Clindamycin Diarrhea    Flagyl [metronidazole hcl]     Metronidazole        ANES-RELATED MAR [id number]                Anesthesia Evaluation    I have reviewed the Patient Summary Reports.    I have reviewed the Nursing Notes.   I have reviewed the Medications.     Review of Systems  Anesthesia Hx:  No problems with previous Anesthesia  History of prior surgery of interest to airway management or planning: Denies Family Hx of Anesthesia complications.   Denies Personal Hx of Anesthesia complications.   Social:  Non-Smoker    Hematology/Oncology:     Oncology Normal    -- Anemia: Anemia of Chronic Kidney Disease  Chronic  Hematology Comments: H/H 8.7/28    EENT/Dental:EENT/Dental Normal   Cardiovascular:   Exercise tolerance: poor Hypertension, well controlled Past MI CAD  CABG/stent (BMS 2012)   Denies Angina. CHF         PVD ECG has been reviewed. TTE: EF 30%, G2DD, Mild aortic valve stenosis. ARNOLD 1.68 cm2; peak velocity is 2.28 m/s; mean gradient is 12 mmHg.    EKG: NSR    Stents 2005   Pulmonary:  Pulmonary Normal    Renal/:   Chronic Renal Disease (on PD x12 years), ESRD Peritomeal dialysys    Dilutional hyochloremic hyponatremia Na 130    Corrected Ca wnl   Hepatic/GI:  Hepatic/GI Normal    Musculoskeletal:   Arthritis   Spine Disorders: (Spinal stenosis) lumbar Chronic Pain and Disc disease    Neurological:   TIA, CVA, no residual symptoms Headaches Uses scooter to get around  Peripheral Neuropathy    Endocrine:   Diabetes, well  controlled, using insulin A1C 6.3   Dermatological:  Skin Normal    Psych:  Psychiatric Normal         Wt Readings from Last 1 Encounters:   03/14/19 97.2 kg (214 lb 4.6 oz)     Temp Readings from Last 1 Encounters:   03/14/19 37.2 °C (98.9 °F) (Oral)     BP Readings from Last 1 Encounters:   03/14/19 109/61     Pulse Readings from Last 1 Encounters:   03/14/19 99     SpO2 Readings from Last 1 Encounters:   03/14/19 100%       Physical Exam  General:  Obesity    Airway/Jaw/Neck:  Airway Findings: Mouth Opening: Normal Tongue: Normal  General Airway Assessment: Adult  Mallampati: IV  TM Distance: Normal, at least 6 cm  Jaw/Neck Findings:     Neck ROM: Normal ROM      Dental:  Dental Findings: In tact   Chest/Lungs:  Chest/Lungs Findings: Clear to auscultation, Normal Respiratory Rate     Heart/Vascular:  Heart Findings: Rate: Normal  Rhythm: Regular Rhythm  Sounds: Normal     Abdomen:  Abdomen Findings:  Normal, Soft, Tenderness          Lab Results   Component Value Date    WBC 17.87 (H) 03/14/2019    HGB 8.1 (L) 03/14/2019    HGB 8.1 (L) 03/14/2019    HCT 25.5 (L) 03/14/2019    HCT 25.5 (L) 03/14/2019    MCV 90 03/14/2019     03/14/2019        Chemistry        Component Value Date/Time     (L) 03/14/2019 0818     (L) 03/14/2019 0818    K 4.3 03/14/2019 0818    K 4.3 03/14/2019 0818    CL 92 (L) 03/14/2019 0818    CL 92 (L) 03/14/2019 0818    CO2 27 03/14/2019 0818    CO2 27 03/14/2019 0818    BUN 71 (H) 03/14/2019 0818    BUN 71 (H) 03/14/2019 0818    CREATININE 10.0 (H) 03/14/2019 0818    CREATININE 10.0 (H) 03/14/2019 0818    GLU 96 03/14/2019 0818    GLU 96 03/14/2019 0818        Component Value Date/Time    CALCIUM 7.4 (L) 03/14/2019 0818    CALCIUM 7.4 (L) 03/14/2019 0818    ALKPHOS 92 03/14/2019 0818    AST 20 03/14/2019 0818    ALT <5 (L) 03/14/2019 0818    BILITOT 0.4 03/14/2019 0818    ESTGFRAFRICA 5 (A) 03/14/2019 0818    ESTGFRAFRICA 5 (A) 03/14/2019 0818    EGFRNONAA 4 (A) 03/14/2019  0818    EGFRNONAA 4 (A) 03/14/2019 0818          Lab Results   Component Value Date    ALBUMIN 1.7 (L) 03/14/2019    ALBUMIN 1.7 (L) 03/14/2019      Lab Results   Component Value Date    TSH 2.212 02/24/2017      Lab Results   Component Value Date    APTT 26.3 09/19/2018      Lab Results   Component Value Date    INR 1.7 (H) 03/13/2019       CXR [id number]  Mild cardiomegaly.  The lungs are clear.  No pleural effusion    EKG  inus tachycardia with Fusion complexes  Possible Anterior infarct ,age undetermined  ST and T wave abnormality, consider lateral ischemia  Abnormal ECG  When compared with ECG of 20-SEP-2018 00:36,  Fusion complexes are now Present  Nonspecific T wave abnormality now evident in Inferior leads  T wave inversion now evident in Lateral leads  Confirmed     ECHO [id number]  · Moderately decreased left ventricular systolic function. The estimated ejection fraction is 30%  · Grade II (moderate) left ventricular diastolic dysfunction consistent with pseudonormalization.  · Elevated left atrial pressure.  · Normal right ventricular systolic function.  · Mild aortic valve stenosis.  · Aortic valve area is 1.68 cm2; peak velocity is 2.28 m/s; mean gradient is 12 mmHg.  · Normal central venous pressure (3 mm Hg).  · The estimated PA systolic pressure is 27 mm Hg        Anesthesia Plan  Type of Anesthesia, risks & benefits discussed:  Anesthesia Type:  MAC, general  Patient's Preference:   Intra-op Monitoring Plan:   Intra-op Monitoring Plan Comments:   Post Op Pain Control Plan:   Post Op Pain Control Plan Comments:   Induction:   IV  Beta Blocker:  Patient is on a Beta-Blocker and has received one dose within the past 24 hours (No further documentation required).       Informed Consent: Patient understands risks and agrees with Anesthesia plan.  Questions answered. Anesthesia consent signed with patient.  ASA Score: 4     Day of Surgery Review of History & Physical:        Anesthesia Plan Notes: Pt is high  risk for cardiopulmonary decompensation with anesthesia.        Ready For Surgery From Anesthesia Perspective.

## 2019-03-14 NOTE — PT/OT/SLP PROGRESS
Occupational Therapy  Visit Attempt     Patient Name:  Jenni Todd   MRN:  [id number]    Patient not seen this AM 2/2 RYAN in endo procedure. Will attempt again as available.    Alia Luna OT  3/14/2019

## 2019-03-14 NOTE — [employer] PATIENT INSTRUCTIONS
Discharge Summary/Instructions after an Endoscopic Procedure  Patient Name: Jenni Todd  Patient MRN: [id number]  Patient YOB: 1970  Thursday, March 14, 2019  Adolph Davila MD  RESTRICTIONS:  During your procedure today, you received medications for sedation.  These   medications may affect your judgment, balance and coordination.  Therefore,   for 24 hours, you have the following restrictions:   - DO NOT drive a car, operate machinery, make legal/financial decisions,   sign important papers or drink alcohol.    ACTIVITY:  Today: no heavy lifting, straining or running due to procedural   sedation/anesthesia.  The following day: return to full activity including work.  DIET:  Eat and drink normally unless instructed otherwise.     TREATMENT FOR COMMON SIDE EFFECTS:  - Mild abdominal pain, nausea, belching, bloating or excessive gas:  rest,   eat lightly and use a heating pad.  - Sore Throat: treat with throat lozenges and/or gargle with warm salt   water.  - Because air was used during the procedure, expelling large amounts of air   from your rectum or belching is normal.  - If a bowel prep was taken, you may not have a bowel movement for 1-3 days.    This is normal.  SYMPTOMS TO WATCH FOR AND REPORT TO YOUR PHYSICIAN:  1. Abdominal pain or bloating, other than gas cramps.  2. Chest pain.  3. Back pain.  4. Signs of infection such as: chills or fever occurring within 24 hours   after the procedure.  5. Rectal bleeding, which would show as bright red, maroon, or black stools.   (A tablespoon of blood from the rectum is not serious, especially if   hemorrhoids are present.)  6. Vomiting.  7. Weakness or dizziness.  GO DIRECTLY TO THE NEAREST EMERGENCY ROOM IF YOU HAVE ANY OF THE FOLLOWING:      Difficulty breathing              Chills and/or fever over 101 F   Persistent vomiting and/or vomiting blood   Severe abdominal pain   Severe chest pain   Black, tarry stools   Bleeding- more than one  tablespoon   Any other symptom or condition that you feel may need urgent attention  Your doctor recommends these additional instructions:  If any biopsies were taken, your doctors clinic will contact you in 1 to 2   weeks with any results.  - Return patient to ICU for ongoing care.   - NPO today if additional surgical procedure planned. If no other procedure   planned, may begin renal diet.   - Continue present medications. Reduce protonix to 40mg daily. Resume   antiplatelet therapy.  - Follow up path results  For questions, problems or results please call your physician - Adolph Davila MD at Work:  [phone].  EMERGENCY PHONE NUMBER: [phone],  LAB RESULTS: [phone]  IF A COMPLICATION OR EMERGENCY SITUATION ARISES AND YOU ARE UNABLE TO REACH   YOUR PHYSICIAN - GO DIRECTLY TO THE EMERGENCY ROOM.  MD Adolph López MD  3/14/2019 10:58:36 AM  This report has been verified and signed electronically.  [employer]

## 2019-03-14 NOTE — PLAN OF CARE
crrt started,by Mona dialysis nurse, lines reversed, flow started at 250 to go to 300, no co from pt, bp 110/70, if bp stable, will inc flow to 300

## 2019-03-15 NOTE — NURSING
Notified primary team of hypotension. MD to review orders.    I personally performed the service described in the documentation recorded by the scribe in my presence, and it accurately and completely records my words and actions.

## 2019-03-15 NOTE — PROGRESS NOTES
PGY-2 Progress Note  [facility] [facility]  Follow up for:   Chief Complaint   Patient presents with    Vomiting     48 year old female presents to ed cc of abdominal pain with diarrhea that began on sunday and emesis today. patient is pd patient last pd exchange was yesterday full treamtent took antibiotic pd treatment today.      Hospital Stay Day 19    Subjective:   The patient said she is feeling better. No acute event overnight. H/H drop to 6.3. Will give 1uprbc, prepped for 4. Will keep H/H above 8. Resumed diet already, tolerating po. No sign of acute bleeding both hematemesis or hematochezia. She denies f/c, ha, cp/sob, abd/urinary complaints, n/v/c, weakness and numbness.        Scheduled Meds:   atorvastatin  40 mg Oral QHS    calcitRIOL  0.5 mcg Oral QAM    cinacalcet  90 mg Oral QHS    epoetin adonay (PROCRIT) injection  20,000 Units Subcutaneous Every Mon, Wed, Fri    fluconazole (DIFLUCAN) IVPB  200 mg Intravenous Q24H    Lactobacillus acidoph-L.bulgar  4 tablet Oral TID WM    lorazepam  1 mg Intravenous Once    pantoprazole  40 mg Intravenous BID    piperacillin-tazobactam 4.5 g in dextrose 5 % 100 mL IVPB (ready to mix system)  4.5 g Intravenous Q12H    potassium chloride  20 mEq Oral Daily    prochlorperazine  10 mg Intravenous Q8H    ramelteon  8 mg Oral QHS    sevelamer carbonate  800 mg Oral TID WM    sodium bicarbonate  1,300 mg Oral BID    sucralfate  1 g Oral QID (AC & HS)    vancomycin (VANCOCIN) IVPB  750 mg Intravenous Q48H    vitamin renal formula (B-complex-vitamin c-folic acid)  1 capsule Oral Daily     Continuous Infusions:    PRN Meds:sodium chloride, sodium chloride, aluminum-magnesium hydroxide-simethicone, dextrose 50%, dextrose 50%, glucagon (human recombinant), glucose, glucose, heparin (porcine), HYDROmorphone, insulin aspart U-100, ondansetron, promethazine, simethicone, sodium chloride 0.9%    Review of patient's allergies indicates:   Allergen Reactions    Clindamycin  Diarrhea    Flagyl [metronidazole hcl]     Metronidazole        Objectives:     Vitals(Most Recent)      BP  Min: 88/46  Max: 129/73  Temp  Av.8 °F (36.6 °C)  Min: 97.5 °F (36.4 °C)  Max: 98.3 °F (36.8 °C)  Pulse  Av.3  Min: 79  Max: 125  Resp  Av.6  Min: 6  Max: 39  SpO2  Av.9 %  Min: 59 %  Max: 100 %  Weight  Av.7 kg (213 lb 3 oz)  Min: 96.7 kg (213 lb 3 oz)  Max: 96.7 kg (213 lb 3 oz)             Vitals([id number])  Temp:  [97.5 °F (36.4 °C)-98.3 °F (36.8 °C)]   Pulse:  []   Resp:  [6-39]   BP: ()/(46-79)   SpO2:  [59 %-100 %]     I & O([id number])    Intake/Output Summary (Last 24 hours) at 3/15/2019 0958  Last data filed at 3/15/2019 0721  Gross per 24 hour   Intake 1510 ml   Output 0 ml   Net 1510 ml     Physical Exam:   General: Anxious  HEENT: NCAT. PERRLA. EOMI grossly intact.     CV: RRR. No M/R/G. No S3  Chest: on NC, sats well. NO wob. No rales heard on exam.   Abd: +BS x 4. Diffuse tense. No rebound or guarding. No peritoneal signs.   Ext: moving well. +distal pulses  Skin: Intact. No rash. No lesions.   Neuro: CN II-XII intact. No focal deficit.    LABS  CBC  Recent Labs   Lab 19  0819 19  1626 19  1803 03/15/19  0414   WBC 17.87*  --  17.16* 11.28   RBC 2.82*  --  2.59* 2.19*   HGB 8.1*  8.1* 6.9* 7.4* 6.3*   HCT 25.5*  25.5* 21.4* 23.8* 20.0*     --  271 216   MCV 90  --  92 91   MCH 28.7  --  28.6 28.8   MCHC 31.8*  --  31.1* 31.5*       CMP  Recent Labs   Lab 19  1634 03/14/19  2216 03/15/19  041   * 133* 133*  133*   K 4.6 4.4 4.3  4.3   CO2 24 24 24  24   CL 93* 94* 96  96   BUN 67* 57* 58*  58*   CREATININE 10.2* 8.7* 8.6*  8.6*    101 122*  122*     Recent Labs   Lab 19  1626 19  1634 03/14/19  2216 03/15/19  0414   CALCIUM  --  7.3* 7.6* 7.4*  7.4*   MG 1.9  --  1.9 1.8   PHOS  --  6.4* 5.7* 6.1*  6.1*     Recent Labs   Lab 19  1645 19  0818 19  03/15/19  0414   PROT 5.1* 5.5*  --   --  4.9*   ALBUMIN 1.6* 1.7*  1.7* 1.5* 1.7* 1.5*  1.5*   BILITOT 0.6 0.4  --   --  0.2   AST 26 20  --   --  17   ALKPHOS 93 92  --   --  78   ALT <5* <5*  --   --  <5*       LAST HbA1c  Lab Results   Component Value Date    HGBA1C 6.3 (H) 09/12/2018     Micro:  Microbiology Results (last 7 days)     Procedure Component Value Units Date/Time    Culture, Anaerobe [[id number]] Collected:  03/08/19 1729    Order Status:  Completed Specimen:  Body Fluid from Peritoneal Fluid Updated:  03/15/19 0836     Anaerobic Culture No anaerobes isolated    Blood culture [[id number]] Collected:  03/13/19 1203    Order Status:  Completed Specimen:  Blood Updated:  03/14/19 1812     Blood Culture, Routine No Growth to date     Blood Culture, Routine No Growth to date    Narrative:       Collection has been rescheduled by [facility] at 03/13/2019 10:00 Reason:   doctors & nurses are trying to start a line  Collection has been rescheduled by [facility] at 03/13/2019 11:03 Reason:   the nurse Gilmer will call if they need lab to draw  Collection has been rescheduled by [facility] at 03/13/2019 10:00 Reason:   doctors & nurses are trying to start a line  Collection has been rescheduled by [facility] at 03/13/2019 11:03 Reason:   the nurse Gilmer will call if they need lab to draw    Blood culture [[id number]] Collected:  03/13/19 1152    Order Status:  Completed Specimen:  Blood Updated:  03/14/19 1812     Blood Culture, Routine No Growth to date     Blood Culture, Routine No Growth to date    Narrative:       Collection has been rescheduled by [facility] at 03/13/2019 10:00 Reason:   doctors & nurses are trying to start a line  Collection has been rescheduled by [facility] at 03/13/2019 11:03 Reason:   the nurse Gilmer will call if they need lab to draw  Collection has been rescheduled by [facility] at 03/13/2019 10:00 Reason:   doctors & nurses are trying to start a line  Collection has been rescheduled by [facility] at 03/13/2019 11:03 Reason:   the nurse  Gilmer will call if they need lab to draw    Fungus culture [[id number]] Collected:  03/12/19 1431    Order Status:  Completed Specimen:  Body Fluid from Peritoneal Fluid Updated:  03/14/19 1100     Fungus (Mycology) Culture Culture in progress    Aerobic culture [[id number]] Collected:  03/12/19 1431    Order Status:  Completed Specimen:  Body Fluid from Peritoneal Fluid Updated:  03/14/19 1055     Aerobic Bacterial Culture No growth    Gram stain [[id number]] Collected:  03/12/19 1431    Order Status:  Completed Specimen:  Body Fluid from Peritoneal Fluid Updated:  03/12/19 2214     Gram Stain Result Rare WBC's      No organisms seen    Aerobic culture [[id number]] Collected:  03/08/19 1729    Order Status:  Completed Specimen:  Body Fluid from Peritoneal Fluid Updated:  03/12/19 1014     Aerobic Bacterial Culture No growth    Blood culture [[id number]] Collected:  03/03/19 1625    Order Status:  Completed Specimen:  Blood Updated:  03/08/19 2012     Blood Culture, Routine No growth after 5 days.        Assessment/Plan:   47 y/o F with h/o HTN, DM, PAD, AoCD 2/2 CKD, on PD since 2004 presented with peritonitis.     Upper GI bleed  Repeated scope scheduled am by [facility] shows clean based gastric ulcer. Cnt protonix.      Peritonitis 2/2 Peritoneal Dialysis  PD initiated on 4/20/04  Remains on abx  Continue IP oxacillin, IV vanc, and IV bactrim vanc level remains supra therapeutic so will continue to hold IV vanc today; last dosed on 3/7  PD fluid aerobic cx sensitive to bactrim  WBC 1000s  General surgery c/s and recommend removal and PD catheter and placement of permacath.  Will plan for OR tomorrow and NPO at midnight. Patient's ECHO with EF 30% but she is asymptomatic and without evidence of S3. She denies orthopnea or PND and is able to lie flat w/o symptoms.    team discussed, will add diflucan.  LA 7. Re-culture x2 on 3/13 NGTD  Following ID recs. On VoZyn.        ESRD  Managed by [facility].   In transition between HD  and PD.     Hypotension  Resolved after transfusion.     Anemia of Chronic Disease secondary to CKD  Stable for now.         DM  A1C (9/12/18): 6.3  Continue to monitor with POCT glucose QID  Currently on SSI         Code: full  Diet: diabetic/renal/cardio    Dispo: jimy broad abx, rescoped shows clean based ulcer, will keep h/h above 8. On ppi. PD cath infected taken out by gen surg. On 3/14. Has fem line for HD for now.    Case d/w staff.     Loyd Beltre MD HO2  [facility]   Right Hemisphere chat is available. Cntl + ALT +5

## 2019-03-15 NOTE — PT/OT/SLP PROGRESS
Occupational Therapy  Visit Attempt     Patient Name:  Jenni Todd   MRN:  [id number]    Patient not seen today secondary to currently on CRRT through femoral line and receiving blood transfusion at this time. Will follow up as available    Alia Luna OT  3/15/2019

## 2019-03-15 NOTE — PLAN OF CARE
Ate some solid food, past first trying liquids, no problem      bp stable, but have had the air alarm go off, 5 times since start, flow remains 250, night nurse will gradually inc to 350

## 2019-03-15 NOTE — PROGRESS NOTES
"[facility]    CC: anemia     48 y.o. female w/ ESRD on PD, CAD on DAPT, presents for acute on chronic moderate normocytic anemia which was associated with acute onset melena x 1 day. She is s/p EGD yesterday where 1 cratered fundic ulcer with a clean base was found as well as reflux esophagitis with ulcer. She reports that she feels "Drained" today and did not get much sleep last night. She notes some pain around her access site in her abdomen but otherwise reports that she feels ok. Denies N/V. She reports several bowel movements this morning which were reportedly clear in color. No blood was appreciated. She did have a drop in Hgb overnight to 6.3. 1 Unit pRBCs given by primary team. No other complaints at this time.     Past Medical History    has a past medical history of Abnormal finding on Pap smear, HPV DNA positive (2014), Anemia associated with chronic renal failure, Blood type B+, Bulging discs - symptomatic , CAD (coronary artery disease), Cardiomyopathy (3/13/2019), Diabetes mellitus, type 2, ESRD (end stage renal disease) (04/20/2004), FSGS (focal segmental glomerulosclerosis), Hyperlipidemia, Hypertension (2004), Neuropathy, Obesity, Secondary hyperparathyroidism, renal, TIA (transient ischemic attack), and Uterine fibroid.      Review of Systems  General ROS: negative for - chills, fever or weight loss  Cardiovascular ROS: no chest pain or dyspnea on exertion  Gastrointestinal ROS: no abdominal pain, + change in bowel habits, No black/ bloody stools    Physical Examination  BP (!) 90/55 (BP Location: Right arm, Patient Position: Lying)   Pulse 101   Temp 98.2 °F (36.8 °C) (Oral)   Resp 18   Ht 5' 7" (1.702 m)   Wt 96.7 kg (213 lb 3 oz)   LMP 01/28/2014 (Approximate)   SpO2 100%   Breastfeeding? No   BMI 33.39 kg/m²   General appearance: alert, cooperative, no distress  HENT: Normocephalic, atraumatic, neck symmetrical, no nasal discharge   Eyes: conjunctivae/corneas clear, PERRL, " EOM's intact  Lungs: clear to auscultation in all fields, no dullness to percussion bilaterally, no wheeze  Heart: normal rate, regular rhythm without rub; palpable peripheral pulses  Abdomen: soft, non-tender; bowel sounds normoactive; no organomegaly  Extremities: extremities symmetric; no clubbing, cyanosis, or edema  Integument: Skin color, texture, turgor normal; no rashes; hair distrubution normal  Neurologic: Alert and oriented X 3, normal strength, normal coordination  Psychiatric: no pressured speech; normal affect; no evidence of impaired cognition       Labs:  WBC 11.28  Hgb 6.3  Hct 20.0  Plt 216      K 4.3   Cl 96  CO2 24  BUN 58  Cr 8.6      Imaging:  CXR 3/14/19 No acute processes seen. Cardiomegaly     I have reviewed these images     Assessment:   48 yoF with pmh ESRD on PD, CAD on DAPT presents for acute on chronic anemia associated with melena and coffee ground emesis for one day. Patient with reported melena and acute drop in Hgb from 8 to 6. Vitals stable and exam notable for non-specific neurologic episode which did not seem consistent with epileptic activity. Patient alert and responsive after resolution of event and vitals and glucose stable.    Plan:  -Continue PPI IV BID  - F/U post transfusion CBC   -Continue to monitor H/H  - Monitor for signs of bleeding   -Maintain Hgb > 8 in setting of CAD     We will continue to follow along.     Germán Holman MD  [employer] [facility], PGY II  Gastroenterology Service

## 2019-03-15 NOTE — PROGRESS NOTES
"[facility]  Adult Nutrition  Progress Note    SUMMARY       Recommendations    Recommendation:   1. Continue to encourage intake of meals & supplements as tolerated.     Goals:   Pt will consume at least 50-75% intake at meals  Nutrition Goal Status: progressing towards goal  Communication of RD Recs: reviewed with RN(Anne)    Reason for Assessment  Reason For Assessment: RD follow-up  Diagnosis: (peritonitis)  Relevant Medical History: ESRD, HTN, HLD, obesity, CAD, neuropathy, DM, TIA, ORIF L hip  General Information Comments: Pt on  ADA Renal Cardiac diet with Boost Plus & Novasource Renal. Pt continues with decreased intake at meals. Cosnuming some of her supplements. Receives dialysis. NFPE not warranted-pt nourished.  Nutrition Discharge Planning: pt to d/c on Renal ADA diet    Nutrition Risk Screen  Nutrition Risk Screen: no indicators present    Nutrition/Diet History  Food Preferences: no Uatsdin or cultural food prefs identified  Spiritual, Cultural Beliefs, Sikh Practices, Values that Affect Care: no  Factors Affecting Nutritional Intake: decreased appetite    Anthropometrics  Temp: 97.8 °F (36.6 °C)  Height Method: Stated  Height: 5' 7" (170.2 cm)  Height (inches): 67 in  Weight Method: Bed Scale  Weight: 96.7 kg (213 lb 3 oz)  Weight (lb): 213.19 lb  Ideal Body Weight (IBW), Female: 135 lb  % Ideal Body Weight, Female (lb): 166.75 lb  BMI (Calculated): 35.5  BMI Grade: 35 - 39.9 - obesity - grade II  Usual Body Weight (UBW), k.7 kg  % Usual Body Weight: 106.67  % Weight Change From Usual Weight: 6.45 %     Lab/Procedures/Meds  Pertinent Labs Reviewed: reviewed  Pertinent Labs Comments: Phos 6.1H, Alb 1.5L, Na 133L, BUN 58H, Crea 8.6H, Glu 122H, Ca 7.4L  Pertinent Medications Reviewed: reviewed  Pertinent Medications Comments: calcitriol, diflucan, lactobacillus, renal vitamin    Estimated/Assessed Needs  Weight Used For Calorie Calculations: 62.5 kg (137 lb 12.6 " oz)(IBW)  Energy Calorie Requirements (kcal): 1875 (30 kcal/kg)  Energy Need Method: Kcal/kg  Protein Requirements: 81g (1.3g/kg)  Weight Used For Protein Calculations: 62.5 kg (137 lb 12.6 oz)(IBW)  Estimated Fluid Requirement Method: RDA Method  RDA Method (mL): 1875  CHO Requirement: 225g    Nutrition Prescription Ordered  Current Diet Order: 2000 ADA Cardiac Renal  Oral Nutrition Supplement: Boost Plus, Novasource Renal    Evaluation of Received Nutrient/Fluid Intake  I/O: 1410/0  Energy Calories Required: not meeting needs  Protein Required: not meeting needs  Fluid Required: not meeting needs  Comments: LBM 3/14  Tolerance: tolerating  % Intake of Estimated Energy Needs: 25 - 50 %  % Meal Intake: 25 - 50 %    Nutrition Risk  Level of Risk/Frequency of Follow-up: (2xweekly)     Assessment and Plan  Nutrition Problem  Inadequate energy intake     Related to (etiology):   Decreased appetite     Signs and Symptoms (as evidenced by):   Poor intake at meals     Interventions:  Commercial  beverage     Nutrition Diagnosis Status:   New    Monitor and Evaluation  Food and Nutrient Intake: energy intake  Food and Nutrient Adminstration: diet order  Physical Activity and Function: nutrition-related ADLs and IADLs  Anthropometric Measurements: weight  Biochemical Data, Medical Tests and Procedures: electrolyte and renal panel  Nutrition-Focused Physical Findings: overall appearance     Malnutrition Assessment  NFPE not warranted        Nutrition Follow-Up  RD Follow-up?: Yes

## 2019-03-15 NOTE — PT/OT/SLP PROGRESS
Physical Therapy  Eval Attempt    Patient Name:  Jenni Todd   MRN:  [id number]    Patient not seen today secondary to pt receiving CRRT through femoral line at this time. Will follow-up as available.    Radha Stephens, PT   3/15/2019

## 2019-03-15 NOTE — PROGRESS NOTES
PGY-2 Progress Note  [facility]  Follow up for:   Chief Complaint   Patient presents with    Vomiting     48 year old female presents to ed cc of abdominal pain with diarrhea that began on sunday and emesis today. patient is pd patient last pd exchange was yesterday full treamtent took antibiotic pd treatment today.      Hospital Stay Day 18    Subjective:   The patient said she is feeling better. Per nurse, she has 12x maroon stool, but no emesis, she is due for re-scope am and was given reglan to pass those blood so they can get a better view. H/H stable after 3u prbc. No sign of acute bleeding both hematemesis or hematochezia. She denies f/c, ha, cp/sob, abd/urinary complaints, n/v/c, weakness and numbness.        Scheduled Meds:   atorvastatin  40 mg Oral QHS    calcitRIOL  0.5 mcg Oral QAM    cinacalcet  90 mg Oral QHS    epoetin adonay (PROCRIT) injection  20,000 Units Subcutaneous Every Mon, Wed, Fri    [START ON 3/15/2019] fluconazole (DIFLUCAN) IVPB  200 mg Intravenous Q24H    Lactobacillus acidoph-L.bulgar  4 tablet Oral TID WM    pantoprazole  40 mg Intravenous BID    piperacillin-tazobactam 4.5 g in dextrose 5 % 100 mL IVPB (ready to mix system)  4.5 g Intravenous Q12H    potassium chloride  20 mEq Oral Daily    prochlorperazine  10 mg Intravenous Q8H    ramelteon  8 mg Oral QHS    sevelamer carbonate  800 mg Oral TID WM    sodium bicarbonate  1,300 mg Oral BID    sucralfate  1 g Oral QID (AC & HS)    vancomycin (VANCOCIN) IVPB  750 mg Intravenous Q48H    vitamin renal formula (B-complex-vitamin c-folic acid)  1 capsule Oral Daily     Continuous Infusions:   sodium chloride 0.9%       PRN Meds:sodium chloride, aluminum-magnesium hydroxide-simethicone, dextrose 50%, dextrose 50%, glucagon (human recombinant), glucose, glucose, heparin (porcine), HYDROmorphone, insulin aspart U-100, magnesium sulfate IVPB, ondansetron, promethazine, simethicone, sodium chloride 0.9%, sodium phosphate IVPB,  sodium phosphate IVPB, sodium phosphate IVPB    Review of patient's allergies indicates:   Allergen Reactions    Clindamycin Diarrhea    Flagyl [metronidazole hcl]     Metronidazole        Objectives:     Vitals(Most Recent)      BP  Min: 92/48  Max: 179/76  Temp  Av.5 °F (36.9 °C)  Min: 98.2 °F (36.8 °C)  Max: 98.9 °F (37.2 °C)  Pulse  Av.1  Min: 79  Max: 116  Resp  Av  Min: 6  Max: 39  SpO2  Av.6 %  Min: 59 %  Max: 100 %  Weight  Av.2 kg (214 lb 4.6 oz)  Min: 97.2 kg (214 lb 4.6 oz)  Max: 97.2 kg (214 lb 4.6 oz)             Vitals([id number])  Temp:  [98.2 °F (36.8 °C)-98.9 °F (37.2 °C)]   Pulse:  []   Resp:  [6-39]   BP: ()/(48-83)   SpO2:  [59 %-100 %]     I & O([id number])    Intake/Output Summary (Last 24 hours) at 3/14/2019 2109  Last data filed at 3/14/2019 1830  Gross per 24 hour   Intake 1423.33 ml   Output 1 ml   Net 1422.33 ml     Physical Exam:   General: Anxious  HEENT: NCAT. PERRLA. EOMI grossly intact.     CV: RRR. No M/R/G. No S3  Chest: on NC, sats well. NO wob. No rales heard on exam.   Abd: +BS x 4. Diffuse tense. No rebound or guarding. No peritoneal signs.   Ext: moving well. +distal pulses  Skin: Intact. No rash. No lesions.   Neuro: CN II-XII intact. No focal deficit.    LABS  CBC  Recent Labs   Lab 19  0654 19  0651  19  0819 19  1626 19  1803   WBC 9.18 11.20  --  17.87*  --   --    RBC 2.91* 2.03*  --  2.82*  --  2.59*   HGB 8.7* 6.0*   < > 8.1*  8.1* 6.9* 7.4*   HCT 28.5* 19.9*   < > 25.5*  25.5* 21.4* 23.8*   * 402*  --  311  --  271   MCV 98 98  --  90  --  92   MCH 29.9 29.6  --  28.7  --  28.6   MCHC 30.5* 30.2*  --  31.8*  --  31.1*    < > = values in this interval not displayed.       CMP  Recent Labs   Lab 19  1645 19  0818 19  1634   * 133*  133* 132*   K 4.3 4.3  4.3 4.6   CO2 28 27  27 24   CL 91* 92*  92* 93*   BUN 60* 71*  71* 67*   CREATININE 9.1* 10.0*  10.0* 10.2*   GLU 91  96  96 102     Recent Labs   Lab 03/13/19  1645 03/14/19  0818 03/14/19  1626 03/14/19  1634   CALCIUM 7.1* 7.4*  7.4*  --  7.3*   MG 1.8 1.8 1.9  --    PHOS 6.1* 5.8*  --  6.4*     Recent Labs   Lab 03/13/19  0918 03/13/19  1645 03/14/19  0818 03/14/19  1634   PROT 5.4* 5.1* 5.5*  --    ALBUMIN 1.6* 1.6* 1.7*  1.7* 1.5*   BILITOT 0.2 0.6 0.4  --    AST 15 26 20  --    ALKPHOS 101 93 92  --    ALT <5* <5* <5*  --        LAST HbA1c  Lab Results   Component Value Date    HGBA1C 6.3 (H) 09/12/2018     Micro:  Microbiology Results (last 7 days)     Procedure Component Value Units Date/Time    Blood culture [[id number]] Collected:  03/13/19 1203    Order Status:  Completed Specimen:  Blood Updated:  03/14/19 1812     Blood Culture, Routine No Growth to date     Blood Culture, Routine No Growth to date    Narrative:       Collection has been rescheduled by [facility] at 03/13/2019 10:00 Reason:   doctors & nurses are trying to start a line  Collection has been rescheduled by [facility] at 03/13/2019 11:03 Reason:   the nurse Gilmer will call if they need lab to draw  Collection has been rescheduled by [facility] at 03/13/2019 10:00 Reason:   doctors & nurses are trying to start a line  Collection has been rescheduled by [facility] at 03/13/2019 11:03 Reason:   the nurse Gilmer will call if they need lab to draw    Blood culture [[id number]] Collected:  03/13/19 1152    Order Status:  Completed Specimen:  Blood Updated:  03/14/19 1812     Blood Culture, Routine No Growth to date     Blood Culture, Routine No Growth to date    Narrative:       Collection has been rescheduled by [facility] at 03/13/2019 10:00 Reason:   doctors & nurses are trying to start a line  Collection has been rescheduled by [facility] at 03/13/2019 11:03 Reason:   the nurse Gilmer will call if they need lab to draw  Collection has been rescheduled by [facility] at 03/13/2019 10:00 Reason:   doctors & nurses are trying to start a line  Collection has been rescheduled by [facility] at 03/13/2019 11:03 Reason:    the nurse Gilmer will call if they need lab to draw    Fungus culture [[id number]] Collected:  03/12/19 1431    Order Status:  Completed Specimen:  Body Fluid from Peritoneal Fluid Updated:  03/14/19 1100     Fungus (Mycology) Culture Culture in progress    Aerobic culture [[id number]] Collected:  03/12/19 1431    Order Status:  Completed Specimen:  Body Fluid from Peritoneal Fluid Updated:  03/14/19 1055     Aerobic Bacterial Culture No growth    Gram stain [[id number]] Collected:  03/12/19 1431    Order Status:  Completed Specimen:  Body Fluid from Peritoneal Fluid Updated:  03/12/19 2214     Gram Stain Result Rare WBC's      No organisms seen    Aerobic culture [[id number]] Collected:  03/08/19 1729    Order Status:  Completed Specimen:  Body Fluid from Peritoneal Fluid Updated:  03/12/19 1014     Aerobic Bacterial Culture No growth    Culture, Anaerobe [[id number]] Collected:  03/08/19 1729    Order Status:  Completed Specimen:  Body Fluid from Peritoneal Fluid Updated:  03/12/19 0733     Anaerobic Culture Culture in progress    Blood culture [[id number]] Collected:  03/03/19 1625    Order Status:  Completed Specimen:  Blood Updated:  03/08/19 2012     Blood Culture, Routine No growth after 5 days.    Culture, Anaerobe [[id number]] Collected:  03/01/19 1542    Order Status:  Completed Specimen:  Body Fluid from Abdomen Updated:  03/08/19 0819     Anaerobic Culture No anaerobes isolated        Assessment/Plan:   49 y/o F with h/o HTN, DM, PAD, AoCD 2/2 CKD, on PD since 2004 presented with peritonitis.     Upper GI bleed  Given 3u, h/h stable  Repeated scope scheduled am by L-GI      Peritonitis 2/2 Peritoneal Dialysis  PD initiated on 4/20/04  Remains on abx  Continue IP oxacillin, IV vanc, and IV bactrim vanc level remains supra therapeutic so will continue to hold IV vanc today; last dosed on 3/7  PD fluid aerobic cx sensitive to bactrim  WBC 1000s  General surgery c/s and recommend removal and PD catheter and  placement of permacath.  Will plan for OR tomorrow and NPO at midnight. Patient's ECHO with EF 30% but she is asymptomatic and without evidence of S3. She denies orthopnea or PND and is able to lie flat w/o symptoms.   ID would like to broad IV abx to cefipime, PCP started on broad spx, because pt is allergic to flagyl. She is on vanc/zosyn. ID agreed to the curren abx plan. The team discussed, will add diflucan.  LA 7. Re-culture x2  Following ID recs.      ESRD  Managed by Neph[facility].   In transition to HD and PD.     Hypotension  MAP 50s.   Received 1bolus am 500cc overnight, responding.     Anemia of Chronic Disease secondary to CKD  Stable for now.         DM  A1C (9/12/18): 6.3  Continue to monitor with POCT glucose QID  Currently on SSI     Code: full  Diet: diabetic/renal/cardio    Dispo: cnt broad abx, rescope am, h/h stable. On ppi. PD cath infected will need to come out by gen surg. Has fem line for HD for now.    Case d/w staff.     Loyd Beltre MD HO2  [facility]   [employer] chat is available. Cntl + ALT +5

## 2019-03-15 NOTE — NURSING
Attempted to contact nephrology for CRRT orders. Discussed with Dr. Howell. Awaiting orders at this time,

## 2019-03-15 NOTE — PLAN OF CARE
Problem: Adult Inpatient Plan of Care  Goal: Plan of Care Review  A&O x4. Vital signs stable throughout shift. 1 unit of PRBCs given. CRRT stopped this AM due to problems with femoral trialysis line. New access line placed this afternoon. Several loose/watery BMs; contact precautions initiated and specimen sent to lab. Repositioned frequently. Safety precautions in place. Pt remains free from fall/injury. Plan of care reviewed with patient and family.

## 2019-03-15 NOTE — ASSESSMENT & PLAN NOTE
Ms. Jenni Todd is a 48 y.o. F w/ PMH of HTN, DM, PAD, Anemia of chronic disease 2/2 CKD, on PD since 2004 presenting to the ED from [facility] on 2/21/2019  for concerns of peritonitis. She has a h/o peritonitis several times in the past as per records. 2/21 grew Rothia species outpt. Had been on Vancomycin since 2/21 with peritoneal ceftazdime and then on 3/1 added Cefazolin peritoneal with cefazidime till 3/2. Ceftazidime stopped 3/3. Continued on Vancomycin.  PD fluid cx sent again on 3/1/2019 that grew Enterobacter and MSSA. Pt was started on oxacillin via peritoneum and IV bactrim from 3/5/2019 and IV vancomycin was continued. WBC levels in peritoneal fluids were decreasing and decreased to 71 on 3/9 but now have started to increase again and patient again feeling abdominal tenderness.Repeated fluid cell count 3/12/19 and is 1025 with 95% segs. Cx sent also.    3/13 Sent to ICU for GI bleed, acute blood loss anemia, Placed Left femoral trialysis catheter  3/14 2nd EGD done PD catheter removed    Currently on IV Vanco intermittently as per levels, Pip tazo and Fluconazole. Off oxacillin and Bactrim  Recommendations  --- Seems like the Rothia species seen in 2/21 peritoneal fluid cx taken outpatient has been treated with ceftazidime/vanco and cefazolin  --- Started on Fluconazole and Pip-tazo for broader coverage 3/13 by primary team. On intermittent Vancomycin levels still high 23  --- Agree to continue Pip tazo that will cover Enterobacter in 3/1 fluid cx and Vancomycin that will cover MSSA. Fluid cx from 3/8 and 3/12 so far NGTD. Continue the same for now possibly soon if pt doing well can de escalate and change to just cefepime and stop pip tazo and Vancomycin as recommended before for total of 10 days after source control, PD catheter removal 3/14/2019  --- We will follow the pending blood cx and peritoneal fluid cultures results with you    ID will follow

## 2019-03-15 NOTE — PROGRESS NOTES
[facility]  Infectious Disease  Progress Note    Patient Name: Jenni Todd  MRN: [id number]  Admission Date: 2/21/2019  Length of Stay: 19 days  Attending Physician: Michael Tan III, MD  Primary Care Provider: Michael Tan Iii, MD    Isolation Status: Special Contact  Assessment/Plan:      Peritonitis due to infected peritoneal dialysis catheter     Ms. Jenni Todd is a 48 y.o. F w/ PMH of HTN, DM, PAD, Anemia of chronic disease 2/2 CKD, on PD since 2004 presenting to the ED from [facility] on 2/21/2019  for concerns of peritonitis. She has a h/o peritonitis several times in the past as per records. 2/21 grew Rothia species outpt. Had been on Vancomycin since 2/21 with peritoneal ceftazdime and then on 3/1 added Cefazolin peritoneal with cefazidime till 3/2. Ceftazidime stopped 3/3. Continued on Vancomycin.  PD fluid cx sent again on 3/1/2019 that grew Enterobacter and MSSA. Pt was started on oxacillin via peritoneum and IV bactrim from 3/5/2019 and IV vancomycin was continued. WBC levels in peritoneal fluids were decreasing and decreased to 71 on 3/9 but now have started to increase again and patient again feeling abdominal tenderness.Repeated fluid cell count 3/12/19 and is 1025 with 95% segs. Cx sent also.    3/13 Sent to ICU for GI bleed, acute blood loss anemia, Placed Left femoral trialysis catheter  3/14 2nd EGD done PD catheter removed    Currently on IV Vanco intermittently as per levels, Pip tazo and Fluconazole. Off oxacillin and Bactrim  Recommendations  --- Seems like the Rothia species seen in 2/21 peritoneal fluid cx taken outpatient has been treated with ceftazidime/vanco and cefazolin  --- Started on Fluconazole and Pip-tazo for broader coverage 3/13 by primary team. On intermittent Vancomycin levels still high 23  --- Agree to continue Pip tazo that will cover Enterobacter in 3/1 fluid cx and Vancomycin that will cover MSSA. Fluid cx from 3/8 and 3/12 so far NGTD.  Continue the same for now possibly soon if pt doing well can de escalate and change to just cefepime and stop pip tazo and Vancomycin as recommended before;  for total of 14 days after source control, PD catheter removal 3/14/2019  --- We will follow the pending blood cx and peritoneal fluid cultures results with you    ID will follow         Anticipated Disposition: ID will follow    Thank you for your consult. I will follow-up with patient. Please contact us if you have any additional questions.    Peterson Charlton MD   ID Fellow  Infectious Disease  [facility]    Subjective:     Principal Problem:Peritonitis associated with peritoneal dialysis    HPI: Ms. Jenni Todd is a 48 y.o. F w/ PMH of HTN, DM, PAD, Anemia of chronic disease 2/2 CKD, on PD since 2004 presenting to the ED from [facility] on 2/21/2019  for concerns of peritonitis. She has a h/o peritonitis several times in the past as per records. Patient was in her usual state of health until Sunday 2/17. Patient reported that she first had diarrhea and abdominal cramping. Patient had not been sleeping well and not feeling well since Sunday 2/17.. Patient also reported then diaphoresis, fatigue, subjective fevers and loss of appetite.     At [facility] dialysis center the PD fluid was cloudy and on 2/21 PD fluid sent for Cx from [facility] dialysis [facility] and started on vancomycin and Ceftazidime as outpatient and sent to ED for admission b/o sickness. As per verbal report by nephrology team, the peritoneal fluid cx gre Rothia species and patient was continued on IV Vanco, peritoneal ceftazdime and then on 3/1 added Cefazolin peritoneal with cefazidime till 3/2. Ceftazidime stopped 3/3. Continued on Vancomycin still as of now at time of consult as per levels, intermittenly receiving IV Vanco. Last level 3/12 vanco level was 26.8.     Patient started to feel again abdominal tenderness, looked again sick and PD fluid cx sent again on 3/1/2019 that  grew Enterobacter and MSSA. Pt was started on oxacillin via peritoneum and IV bactrim from 3/5/2019 and IV vancomycin was continued. WBC levels in peritoneal fluids were decreasing and decreased to 71 on 3/9 but now have started to increase again and patient again feeling abdominal tenderness.Repeated fluid cell count 3/12/19 and is 1025 with 95% segs. Cx sent also.    Patient has finally consented and will go on HD for now. Planning to remove PD catheter on 3/13/2019 and placed catheter for HD for now.   ID consult called for further reccs for Antibiotics    Peritoneal Cx data  2/21 Rothia Species as per verbal report  3/1 Enterobacter and MSSA  3/8 Fluid Cx sent Aerobic NGTD. No AFB seen  3/12 Fluid cx sent for aerobic/fungal/AFB=NGTD      Other cx data  2/21 Blood cx no growth  3/1 Dwight cx for Cdiff PCR negative  3/3 Blood cx no growth  3/13 Blood cx NGTD    2/26 TTE showed decreased EF of 30%, no vegetations seen. In 2/2017 EF was 60-65%    ---3/13 Sent to ICU for GI bleed, acute blood loss anemia, to get 3 units PRBCs then EGD then when stable PD cathter removal. Placed Left femoral trialysis catheter  3/14 2nd EGD done shows esophagitis, gastric non bleeding ulcer. PD catheter removed  3/15 was started on CRRT but later had to hold because L femoral line not working. Hb drops in 6s again getting PRBC transfusion 1 unit later may get more. Surgery team called to evaluate trialysis femoral line        Interval History: Pt seen, sleeping but work uo easily getting blood transfusion, CRRT held for now, has mild discomfort in femoral line area but denied any abdominal now. Stated that 1 BM so far    Review of Systems   Constitutional: Positive for activity change, appetite change and fatigue. Negative for diaphoresis.   HENT: Negative for trouble swallowing.    Eyes: Negative for photophobia and pain.   Respiratory: Negative for cough, choking, shortness of breath and wheezing.    Gastrointestinal: Positive for  nausea. Negative for abdominal distention, abdominal pain, blood in stool and diarrhea.   Genitourinary: Negative for dysuria and flank pain.     Objective:     Vital Signs (Most Recent):  Temp: 97.8 °F (36.6 °C) (03/15/19 0915)  Pulse: 86 (03/15/19 1145)  Resp: 17 (03/15/19 1145)  BP: 123/67 (03/15/19 1145)  SpO2: 100 % (03/15/19 1145) Vital Signs (24h Range):  Temp:  [97.5 °F (36.4 °C)-98.3 °F (36.8 °C)] 97.8 °F (36.6 °C)  Pulse:  [] 86  Resp:  [6-31] 17  SpO2:  [59 %-100 %] 100 %  BP: ()/(46-79) 123/67     Weight: 96.7 kg (213 lb 3 oz)  Body mass index is 33.39 kg/m².    Estimated Creatinine Clearance: 9.4 mL/min (A) (based on SCr of 8.6 mg/dL (H)).    Physical Exam   Constitutional: She is oriented to person, place, and time. She appears well-developed and well-nourished.   HENT:   Head: Normocephalic and atraumatic.   Eyes: EOM are normal. Pupils are equal, round, and reactive to light. No scleral icterus.   Neck: Normal range of motion. Neck supple.   Cardiovascular: Normal rate and regular rhythm.   Murmur heard.  Pulmonary/Chest: Effort normal and breath sounds normal. No respiratory distress. She has no wheezes. She has no rales.   Abdominal: Soft. Bowel sounds are normal. There is tenderness. There is no rebound.   2 dressings seen, PD catheter not present. Left femoral trialysis catheter   Genitourinary:   Genitourinary Comments: No terrell   Musculoskeletal: She exhibits no edema or tenderness.   Lymphadenopathy:     She has no cervical adenopathy.   Neurological: She is alert and oriented to person, place, and time.   BL UE and BL LE no gross motor deficits   Skin: Skin is warm.   Psychiatric:   Calm and cooperative   Nursing note and vitals reviewed.      Significant Labs:   CBC:   Recent Labs   Lab 03/14/19  0819 03/14/19  1626 03/14/19  1803 03/15/19  0414   WBC 17.87*  --  17.16* 11.28   HGB 8.1*  8.1* 6.9* 7.4* 6.3*   HCT 25.5*  25.5* 21.4* 23.8* 20.0*     --  271 216     CMP:    Recent Labs   Lab 03/13/19  1645 03/14/19  0818 03/14/19  1634 03/14/19  2216 03/15/19  0414   * 133*  133* 132* 133* 133*  133*   K 4.3 4.3  4.3 4.6 4.4 4.3  4.3   CL 91* 92*  92* 93* 94* 96  96   CO2 28 27  27 24 24 24  24   GLU 91 96  96 102 101 122*  122*   BUN 60* 71*  71* 67* 57* 58*  58*   CREATININE 9.1* 10.0*  10.0* 10.2* 8.7* 8.6*  8.6*   CALCIUM 7.1* 7.4*  7.4* 7.3* 7.6* 7.4*  7.4*   PROT 5.1* 5.5*  --   --  4.9*   ALBUMIN 1.6* 1.7*  1.7* 1.5* 1.7* 1.5*  1.5*   BILITOT 0.6 0.4  --   --  0.2   ALKPHOS 93 92  --   --  78   AST 26 20  --   --  17   ALT <5* <5*  --   --  <5*   ANIONGAP 12 14  14 15 15 13  13   EGFRNONAA 5* 4*  4* 4* 5* 5*  5*     Microbiology Results (last 7 days)     Procedure Component Value Units Date/Time    Aerobic culture [[id number]] Collected:  03/12/19 1431    Order Status:  Completed Specimen:  Body Fluid from Peritoneal Fluid Updated:  03/15/19 1042     Aerobic Bacterial Culture No growth    Culture, Anaerobe [[id number]] Collected:  03/08/19 1729    Order Status:  Completed Specimen:  Body Fluid from Peritoneal Fluid Updated:  03/15/19 0836     Anaerobic Culture No anaerobes isolated    Blood culture [[id number]] Collected:  03/13/19 1203    Order Status:  Completed Specimen:  Blood Updated:  03/14/19 1812     Blood Culture, Routine No Growth to date     Blood Culture, Routine No Growth to date    Narrative:       Collection has been rescheduled by [facility] at 03/13/2019 10:00 Reason:   doctors & nurses are trying to start a line  Collection has been rescheduled by [facility] at 03/13/2019 11:03 Reason:   the nurse Gilmer will call if they need lab to draw  Collection has been rescheduled by [facility] at 03/13/2019 10:00 Reason:   doctors & nurses are trying to start a line  Collection has been rescheduled by [facility] at 03/13/2019 11:03 Reason:   the nurse Gilmer will call if they need lab to draw    Blood culture [[id number]] Collected:  03/13/19 1152    Order Status:   Completed Specimen:  Blood Updated:  03/14/19 1812     Blood Culture, Routine No Growth to date     Blood Culture, Routine No Growth to date    Narrative:       Collection has been rescheduled by [facility] at 03/13/2019 10:00 Reason:   doctors & nurses are trying to start a line  Collection has been rescheduled by [facility] at 03/13/2019 11:03 Reason:   the nurse Gilmer will call if they need lab to draw  Collection has been rescheduled by [facility] at 03/13/2019 10:00 Reason:   doctors & nurses are trying to start a line  Collection has been rescheduled by [facility] at 03/13/2019 11:03 Reason:   the nurse Gilmer will call if they need lab to draw    Fungus culture [[id number]] Collected:  03/12/19 1431    Order Status:  Completed Specimen:  Body Fluid from Peritoneal Fluid Updated:  03/14/19 1100     Fungus (Mycology) Culture Culture in progress    Gram stain [[id number]] Collected:  03/12/19 1431    Order Status:  Completed Specimen:  Body Fluid from Peritoneal Fluid Updated:  03/12/19 2214     Gram Stain Result Rare WBC's      No organisms seen    Aerobic culture [[id number]] Collected:  03/08/19 1729    Order Status:  Completed Specimen:  Body Fluid from Peritoneal Fluid Updated:  03/12/19 1014     Aerobic Bacterial Culture No growth    Blood culture [[id number]] Collected:  03/03/19 1625    Order Status:  Completed Specimen:  Blood Updated:  03/08/19 2012     Blood Culture, Routine No growth after 5 days.        All pertinent labs within the past 24 hours have been reviewed.    Significant Imaging: I have reviewed all pertinent imaging results/findings within the past 24 hours.

## 2019-03-15 NOTE — PLAN OF CARE
Problem: Oral Intake Inadequate  Goal: Improved Oral Intake  Outcome: Ongoing (interventions implemented as appropriate)  Recommendation:   1. Continue to encourage intake of meals & supplements as tolerated.     Goals:   Pt will consume at least 50-75% intake at meals  Nutrition Goal Status: progressing towards goal  Communication of RD Recs: reviewed with RN(Anne)

## 2019-03-15 NOTE — NURSING
CRRT stopped due to problems with femoral access line. Blood returned to patient. Contacted Dr. Solis regarding Trialysis line. MD to assess and troubleshoot line. Contacted nephrology regarding CRRT; awaiting orders at this time.

## 2019-03-15 NOTE — PROCEDURES
"Jenni Todd is a 48 y.o. female patient.    Temp: 98.2 °F (36.8 °C) (03/15/19 1105)  Pulse: 99 (03/15/19 1505)  Resp: 18 (03/15/19 1505)  BP: (!) 93/58 (03/15/19 1505)  SpO2: 100 % (03/15/19 1505)  Weight: 96.7 kg (213 lb 3 oz) (03/15/19 0630)  Height: 5' 7" (170.2 cm) (03/12/19 0108)       Central Line  Date/Time: 3/15/2019 4:13 PM  Performed by: Servando Solis MD  Pre-operative Diagnosis: ESRD  Post-operative diagnosis: ESRD  Consent Done: Yes  Time out: Immediately prior to procedure a "time out" was called to verify the correct patient, procedure, equipment, support staff and site/side marked as required.  Indications: med administration, hemodialysis and vascular access  Anesthesia: local infiltration    Anesthesia:  Local Anesthetic: lidocaine 1% without epinephrine  Preparation: skin prepped with ChloraPrep  Skin prep agent dried: skin prep agent completely dried prior to procedure  Sterile barriers: all five maximum sterile barriers used - cap, mask, sterile gown, sterile gloves, and large sterile sheet  Hand hygiene: hand hygiene performed prior to central venous catheter insertion  Location details: left femoral  Site selection rationale: No other access  Catheter type: triple lumen  Catheter size: 12 Fr  Catheter Length: 20cm    Complications: No  Comments: Catheter exchanged over wire          Servando Solis  3/15/2019  "

## 2019-03-15 NOTE — SUBJECTIVE & OBJECTIVE
Interval History: Pt seen, sleeping but work uo easily getting blood transfusion, CRRT held for now, has mild discomfort in femoral line area but denied any abdominal now. Stated that 1 BM so far    Review of Systems   Constitutional: Positive for activity change, appetite change and fatigue. Negative for diaphoresis.   HENT: Negative for trouble swallowing.    Eyes: Negative for photophobia and pain.   Respiratory: Negative for cough, choking, shortness of breath and wheezing.    Gastrointestinal: Positive for nausea. Negative for abdominal distention, abdominal pain, blood in stool and diarrhea.   Genitourinary: Negative for dysuria and flank pain.     Objective:     Vital Signs (Most Recent):  Temp: 97.8 °F (36.6 °C) (03/15/19 0915)  Pulse: 86 (03/15/19 1145)  Resp: 17 (03/15/19 1145)  BP: 123/67 (03/15/19 1145)  SpO2: 100 % (03/15/19 1145) Vital Signs (24h Range):  Temp:  [97.5 °F (36.4 °C)-98.3 °F (36.8 °C)] 97.8 °F (36.6 °C)  Pulse:  [] 86  Resp:  [6-31] 17  SpO2:  [59 %-100 %] 100 %  BP: ()/(46-79) 123/67     Weight: 96.7 kg (213 lb 3 oz)  Body mass index is 33.39 kg/m².    Estimated Creatinine Clearance: 9.4 mL/min (A) (based on SCr of 8.6 mg/dL (H)).    Physical Exam   Constitutional: She is oriented to person, place, and time. She appears well-developed and well-nourished.   HENT:   Head: Normocephalic and atraumatic.   Eyes: EOM are normal. Pupils are equal, round, and reactive to light. No scleral icterus.   Neck: Normal range of motion. Neck supple.   Cardiovascular: Normal rate and regular rhythm.   Murmur heard.  Pulmonary/Chest: Effort normal and breath sounds normal. No respiratory distress. She has no wheezes. She has no rales.   Abdominal: Soft. Bowel sounds are normal. There is tenderness. There is no rebound.   2 dressings seen, PD catheter not present. Left femoral trialysis catheter   Genitourinary:   Genitourinary Comments: No terrell   Musculoskeletal: She exhibits no edema or  tenderness.   Lymphadenopathy:     She has no cervical adenopathy.   Neurological: She is alert and oriented to person, place, and time.   BL UE and BL LE no gross motor deficits   Skin: Skin is warm.   Psychiatric:   Calm and cooperative   Nursing note and vitals reviewed.      Significant Labs:   CBC:   Recent Labs   Lab 03/14/19  0819 03/14/19  1626 03/14/19  1803 03/15/19  0414   WBC 17.87*  --  17.16* 11.28   HGB 8.1*  8.1* 6.9* 7.4* 6.3*   HCT 25.5*  25.5* 21.4* 23.8* 20.0*     --  271 216     CMP:   Recent Labs   Lab 03/13/19  1645 03/14/19  0818 03/14/19  1634 03/14/19  2216 03/15/19  0414   * 133*  133* 132* 133* 133*  133*   K 4.3 4.3  4.3 4.6 4.4 4.3  4.3   CL 91* 92*  92* 93* 94* 96  96   CO2 28 27  27 24 24 24  24   GLU 91 96  96 102 101 122*  122*   BUN 60* 71*  71* 67* 57* 58*  58*   CREATININE 9.1* 10.0*  10.0* 10.2* 8.7* 8.6*  8.6*   CALCIUM 7.1* 7.4*  7.4* 7.3* 7.6* 7.4*  7.4*   PROT 5.1* 5.5*  --   --  4.9*   ALBUMIN 1.6* 1.7*  1.7* 1.5* 1.7* 1.5*  1.5*   BILITOT 0.6 0.4  --   --  0.2   ALKPHOS 93 92  --   --  78   AST 26 20  --   --  17   ALT <5* <5*  --   --  <5*   ANIONGAP 12 14  14 15 15 13  13   EGFRNONAA 5* 4*  4* 4* 5* 5*  5*     Microbiology Results (last 7 days)     Procedure Component Value Units Date/Time    Aerobic culture [[id number]] Collected:  03/12/19 1431    Order Status:  Completed Specimen:  Body Fluid from Peritoneal Fluid Updated:  03/15/19 1042     Aerobic Bacterial Culture No growth    Culture, Anaerobe [[id number]] Collected:  03/08/19 1729    Order Status:  Completed Specimen:  Body Fluid from Peritoneal Fluid Updated:  03/15/19 0836     Anaerobic Culture No anaerobes isolated    Blood culture [[id number]] Collected:  03/13/19 1203    Order Status:  Completed Specimen:  Blood Updated:  03/14/19 1812     Blood Culture, Routine No Growth to date     Blood Culture, Routine No Growth to date    Narrative:       Collection has been rescheduled  by [facility] at 03/13/2019 10:00 Reason:   doctors & nurses are trying to start a line  Collection has been rescheduled by [facility] at 03/13/2019 11:03 Reason:   the nurse Gilmer will call if they need lab to draw  Collection has been rescheduled by [facility] at 03/13/2019 10:00 Reason:   doctors & nurses are trying to start a line  Collection has been rescheduled by [facility] at 03/13/2019 11:03 Reason:   the nurse Gilmer will call if they need lab to draw    Blood culture [[id number]] Collected:  03/13/19 1152    Order Status:  Completed Specimen:  Blood Updated:  03/14/19 1812     Blood Culture, Routine No Growth to date     Blood Culture, Routine No Growth to date    Narrative:       Collection has been rescheduled by [facility] at 03/13/2019 10:00 Reason:   doctors & nurses are trying to start a line  Collection has been rescheduled by [facility] at 03/13/2019 11:03 Reason:   the nurse Gilmer will call if they need lab to draw  Collection has been rescheduled by [facility] at 03/13/2019 10:00 Reason:   doctors & nurses are trying to start a line  Collection has been rescheduled by [facility] at 03/13/2019 11:03 Reason:   the nurse Gilmer will call if they need lab to draw    Fungus culture [[id number]] Collected:  03/12/19 1431    Order Status:  Completed Specimen:  Body Fluid from Peritoneal Fluid Updated:  03/14/19 1100     Fungus (Mycology) Culture Culture in progress    Gram stain [[id number]] Collected:  03/12/19 1431    Order Status:  Completed Specimen:  Body Fluid from Peritoneal Fluid Updated:  03/12/19 2214     Gram Stain Result Rare WBC's      No organisms seen    Aerobic culture [[id number]] Collected:  03/08/19 1729    Order Status:  Completed Specimen:  Body Fluid from Peritoneal Fluid Updated:  03/12/19 1014     Aerobic Bacterial Culture No growth    Blood culture [[id number]] Collected:  03/03/19 1625    Order Status:  Completed Specimen:  Blood Updated:  03/08/19 2012     Blood Culture, Routine No growth after 5 days.        All pertinent labs within  the past 24 hours have been reviewed.    Significant Imaging: I have reviewed all pertinent imaging results/findings within the past 24 hours.

## 2019-03-16 NOTE — PT/OT/SLP PROGRESS
Occupational Therapy      Patient Name:  Jenni Todd   MRN:  [id number]    Patient not seen today secondary to Nursing care. Will follow-up as able.    Anjelica Frausto OT  3/16/2019

## 2019-03-16 NOTE — SUBJECTIVE & OBJECTIVE
Interval History: Patient did not rest last night; still with diarrhea - c diff PCR was negative so contact plus isolation discontinued and no c diff treatment needed. The HD left groin catheter was changed over wire yesterday but it still did not work for CRRT; it is functional for IV antibiotics. Nephrology obtaining another catheter that is longer and will start CRRT today when that line is placed. So far the BC from 3/13 are negative and the peritoneal fluid cultures from 3/12 and 3/8 are negative. Will streamline antibiotics today to cover the PD fluid isolates.     Review of Systems   Constitutional: Positive for appetite change.        Poor appetite   Respiratory: Negative for shortness of breath.    Gastrointestinal: Positive for abdominal pain and diarrhea. Negative for nausea and vomiting.        Decreased abdominal pain - just incisional pain now      Antibiotics (From admission, onward)    Start     Stop Route Frequency Ordered    03/14/19 2300  piperacillin-tazobactam 4.5 g in dextrose 5 % 100 mL IVPB (ready to mix system)      -- IV Every 12 hours (non-standard times) 03/14/19 1930    03/14/19 1000  vancomycin 750 mg in dextrose 5 % 250 mL IVPB (ready to mix system)      -- IV Every 48 hours (non-standard times) 03/13/19 1043        Antifungals (From admission, onward)    Start     Stop Route Frequency Ordered    03/15/19 1100  fluconazole (DIFLUCAN) IVPB 200 mg 100 mL      -- IV Every 24 hours (non-standard times) 03/14/19 1927        Objective:     Vital Signs (Most Recent):  Temp: 98.2 °F (36.8 °C) (03/16/19 1109)  Pulse: 89 (03/16/19 1200)  Resp: (!) 25 (03/16/19 1200)  BP: 109/68 (03/16/19 1200)  SpO2: 100 % (03/16/19 1200) Vital Signs (24h Range):  Temp:  [98.2 °F (36.8 °C)-98.9 °F (37.2 °C)] 98.2 °F (36.8 °C)  Pulse:  [] 89  Resp:  [5-29] 25  SpO2:  [96 %-100 %] 100 %  BP: ()/(46-81) 109/68     Weight: 96.7 kg (213 lb 3 oz)  Body mass index is 33.39 kg/m².    Estimated Creatinine  Clearance: 10.8 mL/min (A) (based on SCr of 7.5 mg/dL (H)).    Physical Exam   Cardiovascular: Normal heart sounds.   No murmur heard.  Pulmonary/Chest: Breath sounds normal. No respiratory distress.   Abdominal: Bowel sounds are normal. She exhibits distension. There is no tenderness.   Abdomen obese and distended but nontender to palpation - has 2 bandages on abdomen at PD catheter exit site   Musculoskeletal: She exhibits no edema.       Significant Labs:   Blood Culture:   Recent Labs   Lab 02/21/19  1847 02/21/19  1901 03/03/19  1625 03/13/19  1152 03/13/19  1203   LABBLOO No growth after 5 days. No growth after 5 days. No growth after 5 days. No Growth to date  No Growth to date  No Growth to date No Growth to date  No Growth to date  No Growth to date     CBC:   Recent Labs   Lab 03/14/19  1803 03/15/19  0414 03/15/19  1527 03/15/19  2239 03/16/19  0424   WBC 17.16* 11.28  --   --  9.58   HGB 7.4* 6.3* 7.3* 8.5* 9.7*   HCT 23.8* 20.0* 23.0* 26.7* 29.8*    216  --   --  188     CMP:   Recent Labs   Lab 03/14/19  2216 03/15/19  0414 03/16/19  0424   * 133*  133* 133*  133*   K 4.4 4.3  4.3 4.3  4.3   CL 94* 96  96 100  100   CO2 24 24  24 21*  21*    122*  122* 153*  153*   BUN 57* 58*  58* 43*  43*   CREATININE 8.7* 8.6*  8.6* 7.5*  7.5*   CALCIUM 7.6* 7.4*  7.4* 7.4*  7.4*   PROT  --  4.9* 5.5*   ALBUMIN 1.7* 1.5*  1.5* 1.7*  1.7*   BILITOT  --  0.2 0.3   ALKPHOS  --  78 86   AST  --  17 18   ALT  --  <5* <5*   ANIONGAP 15 13  13 12  12   EGFRNONAA 5* 5*  5* 6*  6*     Peritoneal fluid cultures:  Wound Culture:   Recent Labs   Lab 03/08/19  1729 03/12/19  1431   LABAERO No growth No growth       Significant Imaging:   3/14 cxr - no acute abnormality.

## 2019-03-16 NOTE — CONSULTS
Interventional radiology were consulted for dialysis access in [facility] and this is not within our scope.

## 2019-03-16 NOTE — PROGRESS NOTES
Primary Team MD Levine called and informed on the update of the patient, she said she would come to the Unit.

## 2019-03-16 NOTE — PROGRESS NOTES
Physical Therapy   Missed Evaluation    Jenni Todd   MRN: [id number]       Pt was not seen due to needing  RN to before nursing care prior to evaluation. PT will follow up as able.  Freya Osborne, PT  3/16/2019

## 2019-03-16 NOTE — PROGRESS NOTES
MD Ernst called to inform her that the patient is not on  Dialysis 2/2 to B/P. New orders given, for Type and Hold for 2 more PRBC's . New order to increase existing blood transfusion.

## 2019-03-16 NOTE — PROGRESS NOTES
PGY-2 Progress Note  [facility] [facility]    Follow up for:   Chief Complaint   Patient presents with    Vomiting     48 year old female presents to ed cc of abdominal pain with diarrhea that began on sunday and emesis today. patient is pd patient last pd exchange was yesterday full treamtent took antibiotic pd treatment today.      Hospital Stay Day 20    Subjective:   Received 2 units during the day yesterday and 1 unit overnight. No sign of acute bleeding.  Tolerating PO diet.  She denies f/c, ha, cp/sob, abd/urinary complaints, n/v/c, weakness and numbness.    Scheduled Meds:   atorvastatin  40 mg Oral QHS    calcitRIOL  0.5 mcg Oral QAM    cinacalcet  90 mg Oral QHS    epoetin adonay (PROCRIT) injection  20,000 Units Subcutaneous Every Mon, Wed, Fri    fluconazole (DIFLUCAN) IVPB  200 mg Intravenous Q24H    Lactobacillus acidoph-L.bulgar  4 tablet Oral TID WM    lorazepam  1 mg Intravenous Once    pantoprazole  40 mg Intravenous BID    piperacillin-tazobactam 4.5 g in dextrose 5 % 100 mL IVPB (ready to mix system)  4.5 g Intravenous Q12H    potassium chloride  20 mEq Oral Daily    prochlorperazine  10 mg Intravenous Q8H    ramelteon  8 mg Oral QHS    sevelamer carbonate  800 mg Oral TID WM    sodium bicarbonate  1,300 mg Oral BID    sucralfate  1 g Oral QID (AC & HS)    vancomycin (VANCOCIN) IVPB  750 mg Intravenous Q48H    vitamin renal formula (B-complex-vitamin c-folic acid)  1 capsule Oral Daily     Continuous Infusions:   sodium chloride 0.9%       PRN Meds:sodium chloride, sodium chloride, sodium chloride, sodium chloride, aluminum-magnesium hydroxide-simethicone, dextrose 50%, dextrose 50%, glucagon (human recombinant), glucose, glucose, heparin (porcine), HYDROmorphone, insulin aspart U-100, magnesium sulfate IVPB, ondansetron, promethazine, simethicone, sodium chloride 0.9%, sodium phosphate IVPB, sodium phosphate IVPB, sodium phosphate IVPB    Review of patient's allergies indicates:   Allergen  Reactions    Clindamycin Diarrhea    Flagyl [metronidazole hcl]     Metronidazole        Objectives:     Vitals(Most Recent)      BP  Min: 75/46  Max: 130/80  Temp  Av.6 °F (37 °C)  Min: 98.2 °F (36.8 °C)  Max: 98.9 °F (37.2 °C)  Pulse  Av.8  Min: 79  Max: 104  Resp  Av.1  Min: 5  Max: 29  SpO2  Av.8 %  Min: 96 %  Max: 100 %             Vitals([id number])  Temp:  [98.2 °F (36.8 °C)-98.9 °F (37.2 °C)]   Pulse:  []   Resp:  [5-29]   BP: ()/(46-80)   SpO2:  [96 %-100 %]     I & O([id number])    Intake/Output Summary (Last 24 hours) at 3/16/2019 0920  Last data filed at 3/16/2019 0600  Gross per 24 hour   Intake 890 ml   Output 668 ml   Net 222 ml     Physical Exam:   General: NAD, pleasant  HEENT: NCAT. PERRLA. EOMI grossly intact.     CV: RRR. No M/R/G. No S3  Chest: on NC, sats well. NO wob. No rales heard on exam.   Abd: +BS x 4. Diffusely tense. No rebound or guarding. No peritoneal signs.   Ext: moving well. +distal pulses  Skin: Intact. No rash. No lesions.   Neuro: CN II-XII intact. No focal deficit.    LABS  CBC  Recent Labs   Lab 19  1803 03/15/19  0414 03/15/19  1527 03/15/19  2239 19  0424   WBC 17.16* 11.28  --   --  9.58   RBC 2.59* 2.19*  --   --  3.24*   HGB 7.4* 6.3* 7.3* 8.5* 9.7*   HCT 23.8* 20.0* 23.0* 26.7* 29.8*    216  --   --  188   MCV 92 91  --   --  92   MCH 28.6 28.8  --   --  29.9   MCHC 31.1* 31.5*  --   --  32.6       CMP  Recent Labs   Lab 03/14/19  2216 03/15/19  0414 19  0424   * 133*  133* 133*  133*   K 4.4 4.3  4.3 4.3  4.3   CO2 24 24  24 21*  21*   CL 94* 96  96 100  100   BUN 57* 58*  58* 43*  43*   CREATININE 8.7* 8.6*  8.6* 7.5*  7.5*    122*  122* 153*  153*     Recent Labs   Lab 03/14/19  2216 03/15/19  0414 19  0424   CALCIUM 7.6* 7.4*  7.4* 7.4*  7.4*   MG 1.9 1.8 2.0   PHOS 5.7* 6.1*  6.1* 4.6*  4.6*     Recent Labs   Lab 19  0818  03/14/19  2216 03/15/19  0414 19  0424    PROT 5.5*  --   --  4.9* 5.5*   ALBUMIN 1.7*  1.7*   < > 1.7* 1.5*  1.5* 1.7*  1.7*   BILITOT 0.4  --   --  0.2 0.3   AST 20  --   --  17 18   ALKPHOS 92  --   --  78 86   ALT <5*  --   --  <5* <5*    < > = values in this interval not displayed.       LAST HbA1c  Lab Results   Component Value Date    HGBA1C 6.3 (H) 09/12/2018     Micro:  Microbiology Results (last 7 days)     Procedure Component Value Units Date/Time    C Diff Toxin by PCR [[id number]] Collected:  03/15/19 1313    Order Status:  Completed Updated:  03/16/19 0804     C. diff PCR Negative    Clostridium difficile EIA [[id number]]  (Abnormal) Collected:  03/15/19 1313    Order Status:  Completed Specimen:  Stool Updated:  03/15/19 1921     C. diff Antigen Positive     C difficile Toxins A+B, EIA Negative     Comment: Testing not recommended for children <24 months old.       Blood culture [[id number]] Collected:  03/13/19 1203    Order Status:  Completed Specimen:  Blood Updated:  03/15/19 1812     Blood Culture, Routine No Growth to date     Blood Culture, Routine No Growth to date     Blood Culture, Routine No Growth to date    Narrative:       Collection has been rescheduled by [facility] at 03/13/2019 10:00 Reason:   doctors & nurses are trying to start a line  Collection has been rescheduled by [facility] at 03/13/2019 11:03 Reason:   the nurse Gilmer will call if they need lab to draw  Collection has been rescheduled by [facility] at 03/13/2019 10:00 Reason:   doctors & nurses are trying to start a line  Collection has been rescheduled by [facility] at 03/13/2019 11:03 Reason:   the nurse Gilmer will call if they need lab to draw    Blood culture [[id number]] Collected:  03/13/19 1152    Order Status:  Completed Specimen:  Blood Updated:  03/15/19 1812     Blood Culture, Routine No Growth to date     Blood Culture, Routine No Growth to date     Blood Culture, Routine No Growth to date    Narrative:       Collection has been rescheduled by [facility] at 03/13/2019 10:00 Reason:    doctors & nurses are trying to start a line  Collection has been rescheduled by [facility] at 03/13/2019 11:03 Reason:   the nurse Gilmer will call if they need lab to draw  Collection has been rescheduled by [facility] at 03/13/2019 10:00 Reason:   doctors & nurses are trying to start a line  Collection has been rescheduled by [facility] at 03/13/2019 11:03 Reason:   the nurse Gilmer will call if they need lab to draw    Aerobic culture [[id number]] Collected:  03/12/19 1431    Order Status:  Completed Specimen:  Body Fluid from Peritoneal Fluid Updated:  03/15/19 1042     Aerobic Bacterial Culture No growth    Culture, Anaerobe [[id number]] Collected:  03/08/19 1729    Order Status:  Completed Specimen:  Body Fluid from Peritoneal Fluid Updated:  03/15/19 0836     Anaerobic Culture No anaerobes isolated    Fungus culture [[id number]] Collected:  03/12/19 1431    Order Status:  Completed Specimen:  Body Fluid from Peritoneal Fluid Updated:  03/14/19 1100     Fungus (Mycology) Culture Culture in progress    Gram stain [[id number]] Collected:  03/12/19 1431    Order Status:  Completed Specimen:  Body Fluid from Peritoneal Fluid Updated:  03/12/19 2214     Gram Stain Result Rare WBC's      No organisms seen    Aerobic culture [[id number]] Collected:  03/08/19 1729    Order Status:  Completed Specimen:  Body Fluid from Peritoneal Fluid Updated:  03/12/19 1014     Aerobic Bacterial Culture No growth        Assessment/Plan:   47 y/o F with h/o HTN, DM, PAD, AoCD 2/2 CKD, on PD since 2004 presented with peritonitis and subsequent dramatic drop in hemoglobin with clear base ulcer. No known source of bleeding but with persistent down-trending H/H.     Upper GI bleed  Repeated scope scheduled am by L[facility]GI shows clean based gastric ulcer. Cnt protonix.  GI following    Peritonitis 2/2 Peritoneal Dialysis  Continue IP oxacillin, IV vanc, and IV bactrim, diflucan. vanc due 3/16 (held prior due to supra therapeutic level)  PD fluid aerobic cx sensitive to  bactrim  WBC normal  No longer using PD, cannot perform CRRT through femoral line.    Peritoneal fluid no growth from peritoneal cultures drawn 3/8/19, blood cultures drawn 3/13  Following ID recs.      ESRD  Managed by Nephro.   Unable to perform CRRT overnight due to access difficulties  Nephro and surgery recommend transfer to [facility] for specialty graft placement, but [facility] requires attempt at line by IR first  Consulted IR     Hypotension  Resolves after transfusion but recurrent  Monitoring carefully in ICU     Anemia of Chronic Disease secondary to CKD  Required at least 5 units transfused    DM not on long term insulin  A1C (9/12/18): 6.3  Continue to monitor with POCT glucose QID  Currently on SSI         Code: full  Diet: diabetic/renal/cardio    Dispo: On broad spectrum abx.  Keeping Hemoglobin > 8.  Line did not work overnight for CRRT. Awaiting IR for any potential intervention.  Surgery and nephrology recommend transfer to [facility] for vascular surgery.    Case d/w staff.     Reagan Rivera MD  [facility]  03/16/2019

## 2019-03-16 NOTE — PROGRESS NOTES
"MD Ernst was called and informed her on the patient's updates and the call that was placed to MD Solis. Her words, were "to leave the patient alone tonight and they will deal with this tomorrow.  "

## 2019-03-16 NOTE — ASSESSMENT & PLAN NOTE
Ms. Jenni Todd is a 48 y.o. F w/ PMH of HTN, DM, PAD, Anemia of chronic disease 2/2 CKD, on PD since 2004 presenting to the ED from [facility] on 2/21/2019  for concerns of peritonitis. She has a h/o peritonitis several times in the past as per records. 2/21 grew Rothia species outpt. Had been on Vancomycin since 2/21 with peritoneal ceftazdime and then on 3/1 added Cefazolin peritoneal with cefazidime till 3/2. Ceftazidime stopped 3/3. Continued on Vancomycin.  PD fluid cx sent again on 3/1/2019 that grew Enterobacter and MSSA. Pt was started on oxacillin via peritoneum and IV bactrim from 3/5/2019 and IV vancomycin was continued. WBC levels in peritoneal fluids were decreasing and decreased to 71 on 3/9 but now have started to increase again and patient again feeling abdominal tenderness.Repeated fluid cell count 3/12/19 and is 1025 with 95% segs. Cx sent also.    3/13 Sent to ICU for GI bleed, acute blood loss anemia, Placed Left femoral trialysis catheter  3/13  Started on Fluconazole and Pip-tazo for broader coverage 3/13 by primary team. On intermittent Vancomycin  3/14 2nd EGD done PD catheter removed    Currently on IV Vanco intermittently as per levels, Pip tazo and Fluconazole. Off oxacillin and Bactrim    Recommendations  --- Seems like the Rothia species seen in 2/21 peritoneal fluid cx taken outpatient has been treated with ceftazidime/vanco and cefazolin  ---STOP vancomycin and piptazo and fluconazole 3/16/19  ---START cefepime and plan to continue until 14 days past PD catheter removal (3/14/19) so planned stop date should be around 3/28; dose today for CRRT and then adjust for HD dosing later.   ---Need to get repeat peritoneal fluid cultures and cell count, etc next week - recommend IR tap if possible to be sure peritoneal fluid is clearing (send all studies like before)

## 2019-03-16 NOTE — PROGRESS NOTES
Progress Note  Nephrology      Consult Requested By: Michael Tan III, MD  Reason for Consult: ESRD    SUBJECTIVE:     Review of Systems   Constitutional: Negative for chills and fever.   Respiratory: Negative for cough and shortness of breath.    Cardiovascular: Negative for chest pain and leg swelling.   Gastrointestinal: Positive for abdominal pain and diarrhea. Negative for melena and nausea.     Patient Active Problem List   Diagnosis    Neuropathy    ESRD (end stage renal disease) on PD ( initiated dialysis 04/20/2004)    FSGS (focal segmental glomerulosclerosis) biopsy proven with collapsing features    Anemia in chronic kidney disease, on chronic dialysis    Hypertension    Hyperlipidemia    Obesity    CAD (coronary artery disease) with recent PCI 12/18/2012    Diabetes mellitus, type 2    Awaiting organ transplant status    Bulging discs - symptomatic     Spinal stenosis of sacral and sacrococcygeal region    Hypertensive renal disease    Hypertension associated with diabetes    Hypoalbuminemia    Cerebral infarction due to embolism of middle cerebral artery    Gait abnormality    Chronic low back pain    DDD (degenerative disc disease), lumbar    PAD (peripheral artery disease)    Gastrointestinal hemorrhage with hematemesis    Peritonitis associated with peritoneal dialysis    Peritonitis due to infected peritoneal dialysis catheter    Abdominal pain    Occult GI bleeding    Hypotension due to hypovolemia    Nausea and vomiting    Cardiomyopathy       OBJECTIVE:     Medications:   atorvastatin  40 mg Oral QHS    calcitRIOL  0.5 mcg Oral QAM    cinacalcet  90 mg Oral QHS    epoetin adonay (PROCRIT) injection  20,000 Units Subcutaneous Every Mon, Wed, Fri    fluconazole (DIFLUCAN) IVPB  200 mg Intravenous Q24H    Lactobacillus acidoph-L.bulgar  4 tablet Oral TID WM    lorazepam  1 mg Intravenous Once    pantoprazole  40 mg Intravenous BID    piperacillin-tazobactam 4.5 g  in dextrose 5 % 100 mL IVPB (ready to mix system)  4.5 g Intravenous Q12H    prochlorperazine  10 mg Intravenous Q8H    ramelteon  8 mg Oral QHS    sevelamer carbonate  800 mg Oral TID WM    sodium bicarbonate  1,300 mg Oral TID    sucralfate  1 g Oral QID (AC & HS)    vancomycin (VANCOCIN) IVPB  750 mg Intravenous Q48H    vitamin renal formula (B-complex-vitamin c-folic acid)  1 capsule Oral Daily      sodium chloride 0.9%       Vitals:    03/16/19 1200   BP: 109/68   Pulse: 89   Resp: (!) 25   Temp:      I/O last 3 completed shifts:  In: 1920 [P.O.:820; I.V.:100; Blood:700; IV Piggyback:300]  Out: 735 [Other:185; Blood:550]  Physical Exam   Constitutional: She is oriented to person, place, and time. She appears well-developed and well-nourished. No distress.   HENT:   Head: Normocephalic and atraumatic.   Eyes: EOM are normal. No scleral icterus.   Neck: Neck supple. No JVD present.   Cardiovascular: Normal rate and regular rhythm.   No murmur heard.  Pulmonary/Chest: Effort normal. No respiratory distress. She has no decreased breath sounds. She has no wheezes. She has rales in the right lower field and the left lower field.   Abdominal: Soft. Bowel sounds are normal. She exhibits distension. There is tenderness.   Musculoskeletal: She exhibits no edema.   Neurological: She is alert and oriented to person, place, and time.   Skin: Skin is warm and dry. No erythema. No pallor.   Psychiatric: She has a normal mood and affect. Judgment normal.     Laboratory:  Recent Labs   Lab 03/14/19  1803 03/15/19  0414 03/15/19  1527 03/15/19  2239 03/16/19  0424   WBC 17.16* 11.28  --   --  9.58   HGB 7.4* 6.3* 7.3* 8.5* 9.7*   HCT 23.8* 20.0* 23.0* 26.7* 29.8*    216  --   --  188   MONO 4.7  0.8 3.9*  0.4  --   --  5.7  0.6     Recent Labs   Lab 03/14/19  2216 03/15/19  0414 03/16/19  0424   * 133*  133* 133*  133*   K 4.4 4.3  4.3 4.3  4.3   CL 94* 96  96 100  100   CO2 24 24  24 21*  21*    BUN 57* 58*  58* 43*  43*   CREATININE 8.7* 8.6*  8.6* 7.5*  7.5*   CALCIUM 7.6* 7.4*  7.4* 7.4*  7.4*   PHOS 5.7* 6.1*  6.1* 4.6*  4.6*     Labs reviewed  Diagnostic Results:  X-Ray: Reviewed  US: Reviewed  Echo: Reviewed      ASSESSMENT/PLAN:     1. ESRD - usual PD for 12 years with Dr. Watt - 9 hours, 12L, alternating 1.5 and 2.5%. Dry weight 220lb.   2.8L each exchange, but now due to abdominal tenderness decreased to 2L. 6 exchanges over 8 hours  Peritonitis:  Rothia - sensitivity is still not available, on IV bactrim x10 days, 2 more doses remains + oxacillin 2 gr IP, vancomycin IV to maintain lvl ~ 20  Worsening cell count, worsenign albumin 1.7-1.8     Ref. Range 3/8/2019 14:42 3/9/2019 11:42 3/10/2019 13:29 3/11/2019 15:39   WBC, Body Fluid Latest Units: /cu mm 507 71 187 1143       Transition to HD --removed PD cath 9/13/19 but was unable to  place tunneled CVC     Femoral CVC working poorly and Dr. Solis exchanged it yesterday, still not working      Plan A: find somewhere >32cm trialysis (this way can get IV for abx and CRRT/HD)  Plan B: find the way to dose abx with HD only--> this way can place 36cm dialysis cath but will not have IV access  Plan C: place second femoral central line  36cm for HD and keep existing line for IV access --> dont like this option too much due to high risk of infection    Goal plan: any kind of HD access for over the weekend and Gyro (rapid access) graft placement early next week.   Very difficult case: long term access????--> consult vascular surgeon Dr. Leger or Dr. Escamilla for possible [place] graft. Pt previously had 3 failed access in L arm and no possible access in bilateral IJ.     2. HTN - BP better,   3. Anemia -  Of CKD treated with Epogen + GIB -> 20K Epo q MWF +   Recent Labs   Lab 03/14/19  1803 03/15/19  0414 03/15/19  1527 03/15/19  2239 03/16/19  0424   WBC 17.16* 11.28  --   --  9.58   HGB 7.4* 6.3* 7.3* 8.5* 9.7*   HCT 23.8* 20.0* 23.0* 26.7*  29.8*    216  --   --  188     Lab Results   Component Value Date    IRON 19 (L) 09/15/2018    TIBC 182 (L) 09/15/2018    FERRITIN 1,920 (H) 04/16/2016     4. MBD - cont Fosrenol, sensipar and calcitriol, replace mg  Lab Results   Component Value Date    .7 (H) 04/19/2016    CALCIUM 7.4 (L) 03/16/2019    CALCIUM 7.4 (L) 03/16/2019    PHOS 4.6 (H) 03/16/2019    PHOS 4.6 (H) 03/16/2019     Recent Labs   Lab 03/15/19  0414 03/16/19  0424 03/16/19  0843   MG 1.8 2.0 2.2     Lab Results   Component Value Date    [id number] 8 (L) 04/19/2016     Lab Results   Component Value Date    CO2 21 (L) 03/16/2019    CO2 21 (L) 03/16/2019     No results for input(s): PH, PCO2, PO2, HCO3, POCSATURATED, BE in the last 72 hours.      5. Nutrition - tolerating clear liquid, will try to give nepro with meals  Lab Results   Component Value Date    LABPROT 13.7 (H) 03/16/2019    ALBUMIN 1.7 (L) 03/16/2019    ALBUMIN 1.7 (L) 03/16/2019     Nepro with meals TID. Renal vitamins daily  6. Worked up by hem/onc in the past - not hypercoagulable - r    Thank you for consult, will follow  With any question please call [phone]  Deanna Ernst    [facility]  ERLIN Watt MD, FACVONDA KUHN MD,   MD WIL Dee, NP  [address]  [place], [address] [phone]  After hours answering service: [phone]

## 2019-03-16 NOTE — PROGRESS NOTES
PRBC's is complete, patient saline is infusing. Flexi Seal agreed to the management system. It was placed with minimum difficulty. Patient tolerated well.

## 2019-03-16 NOTE — PROGRESS NOTES
There is a lot of problem with this Access line tonight. Me and my colleague are troubleshooting this line and switching the port from arterial to venous. I  Have repositioned her leg and pulled her Abdomen up it started the line to flowing but the Access pressure remain high, thereby causing the treatment to be delayed.

## 2019-03-16 NOTE — PROGRESS NOTES
Progress Note  Nephrology      Consult Requested By: Michael Tan III, MD  Reason for Consult: ESRD    SUBJECTIVE:     Review of Systems   Constitutional: Negative for chills and fever.   Respiratory: Negative for cough and shortness of breath.    Cardiovascular: Negative for chest pain and leg swelling.   Gastrointestinal: Positive for abdominal pain and diarrhea. Negative for melena and nausea.     Patient Active Problem List   Diagnosis    Neuropathy    ESRD (end stage renal disease) on PD ( initiated dialysis 04/20/2004)    FSGS (focal segmental glomerulosclerosis) biopsy proven with collapsing features    Anemia in chronic kidney disease, on chronic dialysis    Hypertension    Hyperlipidemia    Obesity    CAD (coronary artery disease) with recent PCI 12/18/2012    Diabetes mellitus, type 2    Awaiting organ transplant status    Bulging discs - symptomatic     Spinal stenosis of sacral and sacrococcygeal region    Hypertensive renal disease    Hypertension associated with diabetes    Hypoalbuminemia    Cerebral infarction due to embolism of middle cerebral artery    Gait abnormality    Chronic low back pain    DDD (degenerative disc disease), lumbar    PAD (peripheral artery disease)    Gastrointestinal hemorrhage with hematemesis    Peritonitis associated with peritoneal dialysis    Peritonitis due to infected peritoneal dialysis catheter    Abdominal pain    Occult GI bleeding    Hypotension due to hypovolemia    Nausea and vomiting    Cardiomyopathy       OBJECTIVE:     Medications:   atorvastatin  40 mg Oral QHS    calcitRIOL  0.5 mcg Oral QAM    cinacalcet  90 mg Oral QHS    epoetin adonay (PROCRIT) injection  20,000 Units Subcutaneous Every Mon, Wed, Fri    fluconazole (DIFLUCAN) IVPB  200 mg Intravenous Q24H    Lactobacillus acidoph-L.bulgar  4 tablet Oral TID WM    lorazepam  1 mg Intravenous Once    pantoprazole  40 mg Intravenous BID    piperacillin-tazobactam 4.5 g  in dextrose 5 % 100 mL IVPB (ready to mix system)  4.5 g Intravenous Q12H    potassium chloride  20 mEq Oral Daily    prochlorperazine  10 mg Intravenous Q8H    ramelteon  8 mg Oral QHS    sevelamer carbonate  800 mg Oral TID WM    sodium bicarbonate  1,300 mg Oral BID    sucralfate  1 g Oral QID (AC & HS)    vancomycin (VANCOCIN) IVPB  750 mg Intravenous Q48H    vitamin renal formula (B-complex-vitamin c-folic acid)  1 capsule Oral Daily      sodium chloride 0.9%       Vitals:    03/15/19 2130   BP: 103/61   Pulse: 90   Resp: (!) 23   Temp: 98.8 °F (37.1 °C)     I/O last 3 completed shifts:  In: 2890 [P.O.:1240; I.V.:450; Blood:700; IV Piggyback:500]  Out: 185 [Other:185]  Physical Exam   Constitutional: She appears well-developed and well-nourished. She appears lethargic. No distress.   HENT:   Head: Normocephalic and atraumatic.   Eyes: EOM are normal. No scleral icterus.   Neck: Neck supple. No JVD present.   Cardiovascular: Normal rate and regular rhythm.   No murmur heard.  Pulmonary/Chest: Effort normal and breath sounds normal. No respiratory distress. She has no wheezes. She has no rales.   Abdominal: Soft. Bowel sounds are normal. She exhibits distension. There is tenderness.   Musculoskeletal: She exhibits no edema.   Neurological: She appears lethargic. GCS eye subscore is 4. GCS verbal subscore is 5. GCS motor subscore is 6.   Skin: Skin is warm and dry. No erythema. No pallor.   Psychiatric: She has a normal mood and affect. Judgment normal.     Laboratory:  Recent Labs   Lab 03/14/19  0819  03/14/19  1803 03/15/19  0414 03/15/19  1527   WBC 17.87*  --  17.16* 11.28  --    HGB 8.1*  8.1*   < > 7.4* 6.3* 7.3*   HCT 25.5*  25.5*   < > 23.8* 20.0* 23.0*     --  271 216  --    MONO 3.3*  0.6  --  4.7  0.8 3.9*  0.4  --     < > = values in this interval not displayed.     Recent Labs   Lab 03/14/19  1634 03/14/19  2216 03/15/19  0414   * 133* 133*  133*   K 4.6 4.4 4.3  4.3   CL  93* 94* 96  96   CO2 24 24 24  24   BUN 67* 57* 58*  58*   CREATININE 10.2* 8.7* 8.6*  8.6*   CALCIUM 7.3* 7.6* 7.4*  7.4*   PHOS 6.4* 5.7* 6.1*  6.1*     Labs reviewed  Diagnostic Results:  X-Ray: Reviewed  US: Reviewed  Echo: Reviewed      ASSESSMENT/PLAN:     1. ESRD - usual PD for 12 years with Dr. Watt - 9 hours, 12L, alternating 1.5 and 2.5%. Dry weight 220lb.   2.8L each exchange, but now due to abdominal tenderness decreased to 2L. 6 exchanges over 8 hours  Peritonitis:  Rothia - sensitivity is still not available, on IV bactrim x10 days, 2 more doses remains + oxacillin 2 gr IP, vancomycin IV to maintain lvl ~ 20  Worsening cell count, worsenign albumin 1.7-1.8     Ref. Range 3/8/2019 14:42 3/9/2019 11:42 3/10/2019 13:29 3/11/2019 15:39   WBC, Body Fluid Latest Units: /cu mm 507 71 187 1143       Transition to HD --\removed PD cath 9/13/19 but was unable to  place tunneled CVC     Femoral CVC working poorly and Dr. Solis will exchange it later today      Very difficult case: long term access????--> consult vascular surgeon Dr. Leger or Dr. Escamilla for possible [place] graft. Pt previously had 3 failed access in L arm and no possible access in bilateral IJ.   2. HTN - BP better,   3. Anemia -  Of CKD treated with Epogen + GIB -> 20K Epo q MWF +   Recent Labs   Lab 03/14/19  0819  03/14/19  1803 03/15/19  0414 03/15/19  1527   WBC 17.87*  --  17.16* 11.28  --    HGB 8.1*  8.1*   < > 7.4* 6.3* 7.3*   HCT 25.5*  25.5*   < > 23.8* 20.0* 23.0*     --  271 216  --     < > = values in this interval not displayed.     Lab Results   Component Value Date    IRON 19 (L) 09/15/2018    TIBC 182 (L) 09/15/2018    FERRITIN 1,920 (H) 04/16/2016     4. MBD - cont Fosrenol, sensipar and calcitriol, replace mg  Lab Results   Component Value Date    .7 (H) 04/19/2016    CALCIUM 7.4 (L) 03/15/2019    CALCIUM 7.4 (L) 03/15/2019    PHOS 6.1 (H) 03/15/2019    PHOS 6.1 (H) 03/15/2019     Recent Labs   Lab  03/14/19  1626 03/14/19  2216 03/15/19  0414   MG 1.9 1.9 1.8     Lab Results   Component Value Date    [id number] 8 (L) 04/19/2016     Lab Results   Component Value Date    CO2 24 03/15/2019    CO2 24 03/15/2019     Recent Labs     03/13/19  0939   PH 7.319*   PCO2 44.5   PO2 17*   HCO3 22.9*   POCSATURATED 21*   BE -3         5. Nutrition - tolerating clear liquid, will try to give nepro with meals  Lab Results   Component Value Date    LABPROT 17.9 (H) 03/13/2019    ALBUMIN 1.5 (L) 03/15/2019    ALBUMIN 1.5 (L) 03/15/2019     Nepro with meals TID. Renal vitamins daily  6. Worked up by hem/onc in the past - not hypercoagulable - r    ADDENDUM: resume CRRT, transfuse 2 units PRBC    Thank you for consult, will follow  With any question please call [phone]  Deanna Ernst    [facility]  ERLIN Watt MD, FACVONDA KUHN MD,   MD WIL Dee, NP  [address]  [place], [address] [phone]  After hours answering service: [phone]

## 2019-03-16 NOTE — PLAN OF CARE
Problem: Device-Related Complication Risk (CRRT (Continuous Renal Replacement Therapy))  Goal: Safe, Effective Therapy Delivery  Outcome: Ongoing (interventions implemented as appropriate)  Intervention: Optimize Device Care and Function  Patient is currently having blood infusing with the possibility of needing more before the night is over. She is also in need of having  CRRT resumed.

## 2019-03-16 NOTE — PROGRESS NOTES
[facility]  Infectious Disease  Progress Note    Patient Name: Jenni Todd  MRN: [id number]  Admission Date: 2/21/2019  Length of Stay: 20 days  Attending Physician: Michael Tan III, MD  Primary Care Provider: Michael Tan Iii, MD    Isolation Status: No active isolations  Assessment/Plan:      Peritonitis due to infected peritoneal dialysis catheter     Ms. Jenni Todd is a 48 y.o. F w/ PMH of HTN, DM, PAD, Anemia of chronic disease 2/2 CKD, on PD since 2004 presenting to the ED from [facility] on 2/21/2019  for concerns of peritonitis. She has a h/o peritonitis several times in the past as per records. 2/21 grew Rothia species outpt. Had been on Vancomycin since 2/21 with peritoneal ceftazdime and then on 3/1 added Cefazolin peritoneal with cefazidime till 3/2. Ceftazidime stopped 3/3. Continued on Vancomycin.  PD fluid cx sent again on 3/1/2019 that grew Enterobacter and MSSA. Pt was started on oxacillin via peritoneum and IV bactrim from 3/5/2019 and IV vancomycin was continued. WBC levels in peritoneal fluids were decreasing and decreased to 71 on 3/9 but now have started to increase again and patient again feeling abdominal tenderness.Repeated fluid cell count 3/12/19 and is 1025 with 95% segs. Cx sent also.    3/13 Sent to ICU for GI bleed, acute blood loss anemia, Placed Left femoral trialysis catheter  3/13  Started on Fluconazole and Pip-tazo for broader coverage 3/13 by primary team. On intermittent Vancomycin  3/14 2nd EGD done PD catheter removed    Currently on IV Vanco intermittently as per levels, Pip tazo and Fluconazole. Off oxacillin and Bactrim    Recommendations  --- Seems like the Rothia species seen in 2/21 peritoneal fluid cx taken outpatient has been treated with ceftazidime/vanco and cefazolin  ---STOP vancomycin and piptazo and fluconazole 3/16/19  ---START cefepime and plan to continue until 14 days past PD catheter removal (3/14/19) so planned stop date should  be around 3/28; dose today for CRRT and then adjust for HD dosing later.   ---Need to get repeat peritoneal fluid cultures and cell count, etc next week - recommend IR tap if possible to be sure peritoneal fluid is clearing (send all studies like before)           Anticipated Disposition: unclear at this point    Thank you for your consult. I will follow-up with patient. Please contact us if you have any additional questions.[phone]    Carli Farmer MD  Infectious Disease  [facility]    Subjective:     Principal Problem:Peritonitis associated with peritoneal dialysis    HPI: Ms. Jenni Todd is a 48 y.o. F w/ PMH of HTN, DM, PAD, Anemia of chronic disease 2/2 CKD, on PD since 2004 presenting to the ED from [facility] on 2/21/2019  for concerns of peritonitis. She has a h/o peritonitis several times in the past as per records. Patient was in her usual state of health until Sunday 2/17. Patient reported that she first had diarrhea and abdominal cramping. Patient had not been sleeping well and not feeling well since Sunday 2/17.. Patient also reported then diaphoresis, fatigue, subjective fevers and loss of appetite.     At [facility] dialysis center the PD fluid was cloudy and on 2/21 PD fluid sent for Cx from [facility] and started on vancomycin and Ceftazidime as outpatient and sent to ED for admission b/o sickness. As per verbal report by nephrology team, the peritoneal fluid cx gre Rothia species and patient was continued on IV Vanco, peritoneal ceftazdime and then on 3/1 added Cefazolin peritoneal with cefazidime till 3/2. Ceftazidime stopped 3/3. Continued on Vancomycin still as of now at time of consult as per levels, intermittenly receiving IV Vanco. Last level 3/12 vanco level was 26.8.     Patient started to feel again abdominal tenderness, looked again sick and PD fluid cx sent again on 3/1/2019 that grew Enterobacter and MSSA. Pt was started on oxacillin via peritoneum and IV  bactrim from 3/5/2019 and IV vancomycin was continued. WBC levels in peritoneal fluids were decreasing and decreased to 71 on 3/9 but now have started to increase again and patient again feeling abdominal tenderness.Repeated fluid cell count 3/12/19 and is 1025 with 95% segs. Cx sent also.    Patient has finally consented and will go on HD for now. Planning to remove PD catheter on 3/13/2019 and placed catheter for HD for now.   ID consult called for further reccs for Antibiotics    Peritoneal Cx data  2/21 Rothia Species as per verbal report  3/1 Enterobacter and MSSA  3/8 Fluid Cx sent Aerobic NGTD. No AFB seen  3/12 Fluid cx sent for aerobic/fungal/AFB=NGTD      Other cx data  2/21 Blood cx no growth  3/1 Dwight cx for Cdiff PCR negative  3/3 Blood cx no growth  3/13 Blood cx NGTD    2/26 TTE showed decreased EF of 30%, no vegetations seen. In 2/2017 EF was 60-65%    ---3/13 Sent to ICU for GI bleed, acute blood loss anemia, to get 3 units PRBCs then EGD then when stable PD cathter removal. Placed Left femoral trialysis catheter  3/14 2nd EGD done shows esophagitis, gastric non bleeding ulcer. PD catheter removed  3/15 was started on CRRT but later had to hold because L femoral line not working. Hb drops in 6s again getting PRBC transfusion 1 unit later may get more. Surgery team called to evaluate trialysis femoral line. Left groin line changed over wire but still not working - CRRT interrupted again; transfused total of 3 units PRBC  3/16 Nephrology evaluating options for HD access - getting longer catheter and planning CRRT today.   Interval History: Patient did not rest last night; still with diarrhea - c diff PCR was negative so contact plus isolation discontinued and no c diff treatment needed. The HD left groin catheter was changed over wire yesterday but it still did not work for CRRT; it is functional for IV antibiotics. Nephrology obtaining another catheter that is longer and will start CRRT today when  that line is placed. So far the BC from 3/13 are negative and the peritoneal fluid cultures from 3/12 and 3/8 are negative. Will streamline antibiotics today to cover the PD fluid isolates.     Review of Systems   Constitutional: Positive for appetite change.        Poor appetite   Respiratory: Negative for shortness of breath.    Gastrointestinal: Positive for abdominal pain and diarrhea. Negative for nausea and vomiting.        Decreased abdominal pain - just incisional pain now      Antibiotics (From admission, onward)    Start     Stop Route Frequency Ordered    03/14/19 2300  piperacillin-tazobactam 4.5 g in dextrose 5 % 100 mL IVPB (ready to mix system)      -- IV Every 12 hours (non-standard times) 03/14/19 1930    03/14/19 1000  vancomycin 750 mg in dextrose 5 % 250 mL IVPB (ready to mix system)      -- IV Every 48 hours (non-standard times) 03/13/19 1043        Antifungals (From admission, onward)    Start     Stop Route Frequency Ordered    03/15/19 1100  fluconazole (DIFLUCAN) IVPB 200 mg 100 mL      -- IV Every 24 hours (non-standard times) 03/14/19 1927        Objective:     Vital Signs (Most Recent):  Temp: 98.2 °F (36.8 °C) (03/16/19 1109)  Pulse: 89 (03/16/19 1200)  Resp: (!) 25 (03/16/19 1200)  BP: 109/68 (03/16/19 1200)  SpO2: 100 % (03/16/19 1200) Vital Signs (24h Range):  Temp:  [98.2 °F (36.8 °C)-98.9 °F (37.2 °C)] 98.2 °F (36.8 °C)  Pulse:  [] 89  Resp:  [5-29] 25  SpO2:  [96 %-100 %] 100 %  BP: ()/(46-81) 109/68     Weight: 96.7 kg (213 lb 3 oz)  Body mass index is 33.39 kg/m².    Estimated Creatinine Clearance: 10.8 mL/min (A) (based on SCr of 7.5 mg/dL (H)).    Physical Exam   Cardiovascular: Normal heart sounds.   No murmur heard.  Pulmonary/Chest: Breath sounds normal. No respiratory distress.   Abdominal: Bowel sounds are normal. She exhibits distension. There is no tenderness.   Abdomen obese and distended but nontender to palpation - has 2 bandages on abdomen at PD catheter  exit site   Musculoskeletal: She exhibits no edema.       Significant Labs:   Blood Culture:   Recent Labs   Lab 02/21/19  1847 02/21/19  1901 03/03/19  1625 03/13/19  1152 03/13/19  1203   LABBLOO No growth after 5 days. No growth after 5 days. No growth after 5 days. No Growth to date  No Growth to date  No Growth to date No Growth to date  No Growth to date  No Growth to date     CBC:   Recent Labs   Lab 03/14/19  1803 03/15/19  0414 03/15/19  1527 03/15/19  2239 03/16/19  0424   WBC 17.16* 11.28  --   --  9.58   HGB 7.4* 6.3* 7.3* 8.5* 9.7*   HCT 23.8* 20.0* 23.0* 26.7* 29.8*    216  --   --  188     CMP:   Recent Labs   Lab 03/14/19  2216 03/15/19  0414 03/16/19  0424   * 133*  133* 133*  133*   K 4.4 4.3  4.3 4.3  4.3   CL 94* 96  96 100  100   CO2 24 24  24 21*  21*    122*  122* 153*  153*   BUN 57* 58*  58* 43*  43*   CREATININE 8.7* 8.6*  8.6* 7.5*  7.5*   CALCIUM 7.6* 7.4*  7.4* 7.4*  7.4*   PROT  --  4.9* 5.5*   ALBUMIN 1.7* 1.5*  1.5* 1.7*  1.7*   BILITOT  --  0.2 0.3   ALKPHOS  --  78 86   AST  --  17 18   ALT  --  <5* <5*   ANIONGAP 15 13  13 12  12   EGFRNONAA 5* 5*  5* 6*  6*     Peritoneal fluid cultures:  Wound Culture:   Recent Labs   Lab 03/08/19  1729 03/12/19  1431   LABAERO No growth No growth       Significant Imaging:   3/14 cxr - no acute abnormality.

## 2019-03-16 NOTE — PROGRESS NOTES
"MD Solis returned the call, I informed him that the patient line is not  Cooperating and it is not allowing the therapy to be administered. He said, "there is nothing more, that he can do for this patient, in regards to that Line that is in place". He also, informed me, that he recommended transferring the patient to  [facility].  "

## 2019-03-16 NOTE — PROGRESS NOTES
MD Aclantar is here on the Unit with assessing patient status and Blood Pressure  Reading and MAP. She was informed on MD Ernst orders and she placed them in Epic.

## 2019-03-16 NOTE — PROGRESS NOTES
Discussed case with IR. States that they are unable to offer anything different than has already been tried by GS and recommend transfer to main campus for further work up.     Shelley Lombardi D.O.  [facility] HO-3  03/16/2019

## 2019-03-16 NOTE — NURSING
Notified primary team of hypotension. MD stated to hold off on CRRT until unit of PRBCs is completed.

## 2019-03-16 NOTE — PROGRESS NOTES
Femoral catheter replaced yesterday however CRRT unable to be performed due to high pressures overnight.  The current femoral catheter is likely of an inappropriate length therefore continues to malfunction.  Central supply and the operating room has been search for longer catheter however there is not one available which also has a pigtail catheter for additional access, which the patient also needs.  Replacing the femoral catheter with another inappropriate lengthed catheter is unlikely to improve the ability to perform CRRT.

## 2019-03-16 NOTE — PLAN OF CARE
Problem: Adult Inpatient Plan of Care  Goal: Plan of Care Review  A&O x4. Vital signs stable throughout shift. Plans for dialysis line placement later this evening; will resume CRRT when access is in place. Repositioned frequently. Safety precautions in place. Pt remains free from fall/injury. Plan of care reviewed with patient and family.

## 2019-03-17 NOTE — PROGRESS NOTES
Report given to oncoming nurse, patient is on the bedpan, with having another stool. She is cleaned and pads changed. She tolerated well.

## 2019-03-17 NOTE — PROGRESS NOTES
Progress Note  Nephrology      Consult Requested By: Michael Tan III, MD  Reason for Consult: ESRD    SUBJECTIVE:     Review of Systems   Constitutional: Negative for chills and fever.   Respiratory: Negative for cough and shortness of breath.    Cardiovascular: Negative for chest pain and leg swelling.   Gastrointestinal: Positive for abdominal pain and diarrhea. Negative for melena and nausea.     Patient Active Problem List   Diagnosis    Neuropathy    ESRD (end stage renal disease) on PD ( initiated dialysis 04/20/2004)    FSGS (focal segmental glomerulosclerosis) biopsy proven with collapsing features    Anemia in chronic kidney disease, on chronic dialysis    Hypertension    Hyperlipidemia    Obesity    CAD (coronary artery disease) with recent PCI 12/18/2012    Diabetes mellitus, type 2    Awaiting organ transplant status    Bulging discs - symptomatic     Spinal stenosis of sacral and sacrococcygeal region    Hypertensive renal disease    Hypertension associated with diabetes    Hypoalbuminemia    Cerebral infarction due to embolism of middle cerebral artery    Gait abnormality    Chronic low back pain    DDD (degenerative disc disease), lumbar    PAD (peripheral artery disease)    Gastrointestinal hemorrhage with hematemesis    Peritonitis associated with peritoneal dialysis    Peritonitis due to infected peritoneal dialysis catheter    Abdominal pain    Occult GI bleeding    Hypotension due to hypovolemia    Nausea and vomiting    Cardiomyopathy       OBJECTIVE:     Medications:   atorvastatin  40 mg Oral QHS    calcitRIOL  0.5 mcg Oral QAM    [START ON 3/18/2019] ceFEPime (MAXIPIME) IVPB  1 g Intravenous Daily    cinacalcet  90 mg Oral QHS    epoetin adonay (PROCRIT) injection  20,000 Units Subcutaneous Every Mon, Wed, Fri    Lactobacillus acidoph-L.bulgar  4 tablet Oral TID WM    lorazepam  1 mg Intravenous Once    pantoprazole  40 mg Intravenous BID     prochlorperazine  10 mg Intravenous Q8H    ramelteon  8 mg Oral QHS    sevelamer carbonate  800 mg Oral TID WM    sodium bicarbonate  1,300 mg Oral TID    sucralfate  1 g Oral QID (AC & HS)    vitamin renal formula (B-complex-vitamin c-folic acid)  1 capsule Oral Daily       Vitals:    03/17/19 0645   BP:    Pulse: 93   Resp: 19   Temp:      I/O last 3 completed shifts:  In: 600 [P.O.:100; IV Piggyback:500]  Out: 850 [Stool:300; Blood:550]  Physical Exam   Constitutional: She is oriented to person, place, and time. She appears well-developed and well-nourished. No distress.   HENT:   Head: Normocephalic and atraumatic.   Eyes: EOM are normal. No scleral icterus.   Neck: Neck supple. No JVD present.   Cardiovascular: Normal rate and regular rhythm.   No murmur heard.  Pulmonary/Chest: Effort normal. No respiratory distress. She has no decreased breath sounds. She has no wheezes. She has rales in the right lower field and the left lower field.   Abdominal: Soft. Bowel sounds are normal. She exhibits distension. There is tenderness.   Musculoskeletal: She exhibits no edema.   Neurological: She is alert and oriented to person, place, and time.   Skin: Skin is warm and dry. No erythema. No pallor.   Psychiatric: She has a normal mood and affect. Judgment normal.     Laboratory:  Recent Labs   Lab 03/15/19  0414  03/15/19  2239 03/16/19  0424 03/17/19  0521   WBC 11.28  --   --  9.58 9.41   HGB 6.3*   < > 8.5* 9.7* 9.9*   HCT 20.0*   < > 26.7* 29.8* 31.1*     --   --  188 237   MONO 3.9*  0.4  --   --  5.7  0.6 5.7  0.5    < > = values in this interval not displayed.     Recent Labs   Lab 03/15/19  0414 03/16/19  0424 03/17/19  0521   *  133* 133*  133* 134*   K 4.3  4.3 4.3  4.3 5.0   CL 96  96 100  100 104   CO2 24  24 21*  21* 19*   BUN 58*  58* 43*  43* 44*   CREATININE 8.6*  8.6* 7.5*  7.5* 8.2*   CALCIUM 7.4*  7.4* 7.4*  7.4* 7.7*   PHOS 6.1*  6.1* 4.6*  4.6* 4.5     Labs  reviewed  Diagnostic Results:  X-Ray: Reviewed  US: Reviewed  Echo: Reviewed      ASSESSMENT/PLAN:     1. ESRD - usual PD for 12 years with Dr. Watt - 9 hours, 12L, alternating 1.5 and 2.5%. Dry weight 220lb.   2.8L each exchange, but now due to abdominal tenderness decreased to 2L. 6 exchanges over 8 hours  Peritonitis:  Rothia - sensitivity is still not available, on IV bactrim x10 days, 2 more doses remains + oxacillin 2 gr IP, vancomycin IV to maintain lvl ~ 20  Worsening cell count, worsenign albumin 1.7-1.8     Ref. Range 3/8/2019 14:42 3/9/2019 11:42 3/10/2019 13:29 3/11/2019 15:39   WBC, Body Fluid Latest Units: /cu mm 507 71 187 1143       Transition to HD --removed PD cath 9/13/19 but was unable to  place tunneled CVC     Femoral CVC working poorly and Dr. Solis exchanged it yesterday, still not working    Catheter still not working  US showed patent RIJ--> discuss with surgery possible RIJ placement    ? encapsulating sclerosing peritonitis --> repeat CT with IV contrast --> previuos CT on 3/1 showed calcification and possible mass    Goal:  Allen (rapid access) graft placement early next week.   Very difficult case: long term access????--> consult vascular surgeon Dr. Leger or Dr. Escamilla for possible Allen graft. (declined transfer to [facility] for vascular surgeon eval--> Dr. Roque might be able to do it here) Pt previously had 3 failed access in L arm and no possible access in bilateral IJ.     2. HTN - BP better,   3. Anemia -  Of CKD treated with Epogen + GIB -> 20K Epo q MWF +   Recent Labs   Lab 03/15/19  0414  03/15/19  2239 03/16/19  0424 03/17/19  0521   WBC 11.28  --   --  9.58 9.41   HGB 6.3*   < > 8.5* 9.7* 9.9*   HCT 20.0*   < > 26.7* 29.8* 31.1*     --   --  188 237    < > = values in this interval not displayed.     Lab Results   Component Value Date    IRON 19 (L) 09/15/2018    TIBC 182 (L) 09/15/2018    FERRITIN 1,920 (H) 04/16/2016     4. MBD - cont Fosrenol, sensipar and  calcitriol, replace mg  Lab Results   Component Value Date    .7 (H) 04/19/2016    CALCIUM 7.7 (L) 03/17/2019    PHOS 4.5 03/17/2019     Recent Labs   Lab 03/16/19  0424 03/16/19  0843 03/17/19  0521   MG 2.0 2.2 2.5     Lab Results   Component Value Date    BOGIOJXH84LR 8 (L) 04/19/2016     Lab Results   Component Value Date    CO2 19 (L) 03/17/2019     No results for input(s): PH, PCO2, PO2, HCO3, POCSATURATED, BE in the last 72 hours.      5. Nutrition - tolerating clear liquid, will try to give nepro with meals  Lab Results   Component Value Date    LABPROT 13.7 (H) 03/16/2019    ALBUMIN 1.7 (L) 03/17/2019     Nepro with meals TID. Renal vitamins daily  6. Worked up by hem/onc in the past - not hypercoagulable     Thank you for consult, will follow  With any question please call [phone]  Deanna Ernst    [facility]  ERLIN Watt MD, FACVONDA KUHN MD,   MD WIL Dee, NP  [address]  [place], [address] [phone]  After hours answering service: [phone]

## 2019-03-17 NOTE — PROGRESS NOTES
CRRT has failed again, after many attempts to aid in infusing. Multiple troubleshooting has taken place. Both lines have been flushed, only to find a long clot in the venous line. It was removed the line will flush but will not draw back any blood. Primary Team called, will call Nephrology with this manner

## 2019-03-17 NOTE — PROGRESS NOTES
Large stool per patient rectum, she attempted to hold it, and it just released per patient. Her entire bed was changed and repadded.

## 2019-03-17 NOTE — SUBJECTIVE & OBJECTIVE
Interval History: Patient had new left femoral PD catheter placed yesterday and it malfunctioned over night and CRRT stopped yesterday; new left femoral HD catheter placed today and now CRRT in progress. Discussed with patient in depth today about antibiotic coverage. Tolerating cefepime today. Patient without complaint of abdominal pain today.     Review of Systems   Respiratory: Negative for shortness of breath.    Gastrointestinal: Positive for diarrhea. Negative for abdominal pain, nausea and vomiting.     Antibiotics (From admission, onward)    Start     Stop Route Frequency Ordered    03/18/19 0200  cefepime in dextrose 5 % 1 gram/50 mL IVPB 1 g      -- IV Daily 03/17/19 0855        Antifungals (From admission, onward)    None        Objective:     Vital Signs (Most Recent):  Temp: 98 °F (36.7 °C) (03/17/19 1200)  Pulse: 103 (03/17/19 1332)  Resp: 20 (03/17/19 1332)  BP: 134/63 (03/17/19 1332)  SpO2: 100 % (03/17/19 1332) Vital Signs (24h Range):  Temp:  [97.6 °F (36.4 °C)-98.4 °F (36.9 °C)] 98 °F (36.7 °C)  Pulse:  [] 103  Resp:  [7-30] 20  SpO2:  [99 %-100 %] 100 %  BP: ()/(49-77) 134/63     Weight: 96.7 kg (213 lb 3 oz)  Body mass index is 33.39 kg/m².    Estimated Creatinine Clearance: 9.9 mL/min (A) (based on SCr of 8.2 mg/dL (H)).    Physical Exam   Cardiovascular: Normal heart sounds.   Pulmonary/Chest: Breath sounds normal. No respiratory distress.   Abdominal: Bowel sounds are normal. She exhibits no distension. There is no tenderness.   2 bandages on abdomen s/p PD catheter removal   Musculoskeletal: She exhibits no edema.       Significant Labs:   Blood Culture:   Recent Labs   Lab 02/21/19  1847 02/21/19  1901 03/03/19  1625 03/13/19  1152 03/13/19  1203   LABBLOO No growth after 5 days. No growth after 5 days. No growth after 5 days. No Growth to date  No Growth to date  No Growth to date  No Growth to date No Growth to date  No Growth to date  No Growth to date  No Growth to  date     CBC:   Recent Labs   Lab 03/15/19  2239 03/16/19  0424 03/17/19  0521   WBC  --  9.58 9.41   HGB 8.5* 9.7* 9.9*   HCT 26.7* 29.8* 31.1*   PLT  --  188 237     CMP:   Recent Labs   Lab 03/16/19  0424 03/17/19  0521   *  133* 134*   K 4.3  4.3 5.0     100 104   CO2 21*  21* 19*   *  153* 131*   BUN 43*  43* 44*   CREATININE 7.5*  7.5* 8.2*   CALCIUM 7.4*  7.4* 7.7*   PROT 5.5* 6.1   ALBUMIN 1.7*  1.7* 1.7*   BILITOT 0.3 0.2   ALKPHOS 86 80   AST 18 20   ALT <5* 5*   ANIONGAP 12  12 11   EGFRNONAA 6*  6* 5*     Wound Culture:   Recent Labs   Lab 03/08/19  1729 03/12/19  1431   LABAERO No growth No growth       Significant Imaging:   3/17 CT Abdomen and pelvis ordered  3/17 US vein mapping -   Impression       1. Positive nonocclusive deep venous thrombosis in the left jugular vein.  2. No deep venous thrombosis on the right although a small portion of the jugular vein is obscured by bandaging.  3. Measurements as above for venous mapping

## 2019-03-17 NOTE — PROGRESS NOTES
PGY-3 Progress Note  [facility]  Follow up for: Peritonitis 2/2 Peritoneal Dialysis    Hospital Stay Day 21    Overnight: GS, Dr. George, placed IV access for dialysis. CRRT started however unsuccessful, increased pressures and large clot removed from line. Nephro called and CRRT discontinued.     Subjective: This morning patient has no complaints, states that she is feeling ok.   Denies any CP, SOB, N/V, headaches, fever or chills.       Scheduled Meds:   atorvastatin  40 mg Oral QHS    calcitRIOL  0.5 mcg Oral QAM    ceFEPime (MAXIPIME) IVPB  1 g Intravenous Q12H    cinacalcet  90 mg Oral QHS    epoetin adonay (PROCRIT) injection  20,000 Units Subcutaneous Every Mon, Wed, Fri    Lactobacillus acidoph-L.bulgar  4 tablet Oral TID WM    lorazepam  1 mg Intravenous Once    pantoprazole  40 mg Intravenous BID    prochlorperazine  10 mg Intravenous Q8H    ramelteon  8 mg Oral QHS    sevelamer carbonate  800 mg Oral TID WM    sodium bicarbonate  1,300 mg Oral TID    sucralfate  1 g Oral QID (AC & HS)    vitamin renal formula (B-complex-vitamin c-folic acid)  1 capsule Oral Daily     Continuous Infusions:  PRN Meds:sodium chloride, sodium chloride, sodium chloride, sodium chloride, aluminum-magnesium hydroxide-simethicone, dextrose 50%, dextrose 50%, glucagon (human recombinant), glucose, glucose, heparin (porcine), HYDROmorphone, insulin aspart U-100, ondansetron, potassium chloride, promethazine, simethicone, sodium chloride 0.9%    Review of patient's allergies indicates:   Allergen Reactions    Clindamycin Diarrhea    Flagyl [metronidazole hcl]     Metronidazole        Objectives:     Vitals(Most Recent)      BP  Min: 81/49  Max: 147/66  Temp  Av.1 °F (36.7 °C)  Min: 97.6 °F (36.4 °C)  Max: 98.6 °F (37 °C)  Pulse  Av.7  Min: 74  Max: 102  Resp  Av.6  Min: 12  Max: 30  SpO2  Av %  Min: 99 %  Max: 100 %             Vitals([id number])  Temp:  [97.6 °F (36.4 °C)-98.6 °F (37 °C)]   Pulse:   []   Resp:  [12-30]   BP: ()/(49-81)   SpO2:  [99 %-100 %]     I & O([id number])    Intake/Output Summary (Last 24 hours) at 3/17/2019 0646  Last data filed at 3/16/2019 1454  Gross per 24 hour   Intake 500 ml   Output 300 ml   Net 200 ml       Physical Exam:   General: AAOx3. NAD, resting comfortably in bed  HEENT: NCAT. PERRLA.   CV: RRR.No M/R/G.   Chest: NL effort. CTAB. No R/R/W.   Abd: +BS x 4. Distended, no ttp, no rebound or gaurding  Ext: moving well. +distal pulses  Skin: Intact. No rash. No lesions.   Neuro: CN II-XII intact. No focal deficit.    LABS  CBC  Recent Labs   Lab 03/15/19  0414  03/15/19  2239 03/16/19  0424 03/17/19  0521   WBC 11.28  --   --  9.58 9.41   RBC 2.19*  --   --  3.24* 3.31*   HGB 6.3*   < > 8.5* 9.7* 9.9*   HCT 20.0*   < > 26.7* 29.8* 31.1*     --   --  188 237   MCV 91  --   --  92 94   MCH 28.8  --   --  29.9 29.9   MCHC 31.5*  --   --  32.6 31.8*    < > = values in this interval not displayed.       CMP  Recent Labs   Lab 03/15/19  0414 03/16/19  0424 03/17/19  0521   *  133* 133*  133* 134*   K 4.3  4.3 4.3  4.3 5.0   CO2 24  24 21*  21* 19*   CL 96  96 100  100 104   BUN 58*  58* 43*  43* 44*   CREATININE 8.6*  8.6* 7.5*  7.5* 8.2*   *  122* 153*  153* 131*     Recent Labs   Lab 03/15/19  0414 03/16/19  0424 03/16/19  0843 03/17/19  0521   CALCIUM 7.4*  7.4* 7.4*  7.4*  --  7.7*   MG 1.8 2.0 2.2 2.5   PHOS 6.1*  6.1* 4.6*  4.6*  --  4.5     Recent Labs   Lab 03/15/19  0414 03/16/19  0424 03/17/19  0521   PROT 4.9* 5.5* 6.1   ALBUMIN 1.5*  1.5* 1.7*  1.7* 1.7*   BILITOT 0.2 0.3 0.2   AST 17 18 20   ALKPHOS 78 86 80   ALT <5* <5* 5*         COAGS  Recent Labs   Lab 03/12/19  1129 03/13/19  0651 03/16/19  0424   INR 1.8* 1.7* 1.3*       Imaging  - no new images  - Venous mapping: pending    Micro:  2/21 outside facility: peritoneal fluid cx: Natan  2/21: blood cx x 2: no growth after 5 days  3/1: C Diff PCR negative, Cdiff  antigen Pos, C diff toxin Negative   3/1: Ascites cultures: Aerobic Enterobacter cloacea pan-sens, Staph aureus sens to oxacillin, penicillin, TMP-SMX   Fungal no AFB   Anaerobic no growth  3/3 blood cx: no growth after 5 days  3/8 peritoneal fluid: aerobic/anerobic no growth  fungal cx in progress  3/12 peritoneal fluid: aerobic no growth  gram stain no organism  fungal cx in progress  3/13: blood cx x 2; ngtd  3/15: C Diff PCR negative, Cdiff antigen Pos, C diff toxin Negative       Assessment/Plan:   47 y/o F with h/o HTN, DM, PAD, AoCD 2/2 CKD, ESRD on PD since 2004 presented with peritonitis and later found to have UGIB.        Peritonitis 2/2 Peritoneal Dialysis  - 2/21 outside facility: peritoneal fluid cx: Rothia  - 3/1: Ascites cultures: Aerobic Enterobacter cloacea pan-sens, Staph aureus sens to oxacillin, penicillin, TMP-SMX   Fungal no AFB   Anaerobic no growth  - s/p IP oxacillin, IV vanc  (stopped 3/16) IV zosyn (stopped 3/16) , and IV bactrim, diflucan (stopped 3/16)  - ID consulted with recs to dc vanc, zosyn, diflucan and start cefepime for 14 days. Also recs to obtain new peritoneal fluids via IR tap    - IV cefepime started 3/14 with planned estimated end date 3/28.   - No longer using PD, cannot perform CRRT 2/2 access issues        ESRD  - Managed by Nephro.   - Unable to perform CRRT overnight due to access difficulties  - Access continues to be an issue. IR consulted, out of their scope. GS consulted with line placed yesterday but clotted and unable to use for CRRT overnight  - Follow up Interventional cards vs transfer to [facility]  - vein mapping pending     Upper GI bleed  - EGD by L-GI shows clean based gastric ulcer.   - GI continues to follow  - stable h/h  - IV ppi    Hypotension- Resolved  - stable today  - will continue to monitor     Anemia of Chronic Disease secondary to CKD  S/p 6 units over course of hospital stay  - h/h stable today     DM not on long term insulin  A1C  (9/12/18): 6.3  Continue to monitor with POCT glucose QID  Currently on SSI      Code: full  Diet: diabetic/renal/cardiac  Ppx: dvt: leander/scd, rx held 2/2 gi bleed  GI: PPI 40 mg IV BID  Dispo: Continue IV Abx per ID, keep Hb >8, Follow up Nephro recommendations, Line access.     Case d/w staff.     Shelley Lombardi D.O.  [facility] HO-3  03/17/2019

## 2019-03-17 NOTE — PROCEDURES
03/16/2019    Jenni Todd  [id number]    PROCEDURE PERFORMED: left femoral HD catheter exchange    PERFORMING SURGEON: Karan George Jr    CONSENT:  The risks benefits and alternatives of the procedure were discussed with the patient. The patient was queried for questions and all concerns were addressed.  The patient provided written consent for the procedure.    ANESTHESIA: local    INDICATION: 47 y/o F with ESRD and peritonitis requiring peritoneal dialysis cath removal. Unable to gain cervical venous access for temp HD cath therefore left femoral placed. Multiple line malfunctions likely due to inadequate cath lemgth therefore cath replaced with longer catheter.    FINDINGS: 30 cm Trialysis cath exchanged, aspirated and flushed with ease    PROCEDURE IN DETAIL: Informed consent obtained. Left groin and existing catheter prepped and draped. Lidocaine 1% injected at the insertion site. Guide wire easily passed and existing catheter removed. 30 cm 12F Trialysis catheter easily passed and hubbed. All ports were aspirated then flushes with saline. All functioned appropriately. Additional local injected and the catheter was sutured in placed. A sterile dressing with Biopatch was applied.     EBL: 5 cc    COMPLICATIONS: none    CONDITION: stable    DISPO: May attempt CRRT

## 2019-03-17 NOTE — PROGRESS NOTES
[facility]  Infectious Disease  Progress Note    Patient Name: Jenni Todd  MRN: [id number]  Admission Date: 2/21/2019  Length of Stay: 21 days  Attending Physician: Michael Tan III, MD  Primary Care Provider: Michael Tan Iii, MD    Isolation Status: No active isolations  Assessment/Plan:      Peritonitis due to infected peritoneal dialysis catheter     Ms. Jenni Todd is a 48 y.o. F w/ PMH of HTN, DM, PAD, Anemia of chronic disease 2/2 CKD, on PD since 2004 presenting to the ED from [facility] on 2/21/2019  for concerns of peritonitis. She has a h/o peritonitis several times in the past as per records. 2/21 grew Rothia species outpt. Had been on Vancomycin since 2/21 with peritoneal ceftazdime and then on 3/1 added Cefazolin peritoneal with cefazidime till 3/2. Ceftazidime stopped 3/3. Continued on Vancomycin.  PD fluid cx sent again on 3/1/2019 that grew Enterobacter and MSSA. Pt was started on oxacillin via peritoneum and IV bactrim from 3/5/2019 and IV vancomycin was continued. WBC levels in peritoneal fluids were decreasing and decreased to 71 on 3/9 but now have started to increase again and patient again feeling abdominal tenderness.Repeated fluid cell count 3/12/19 and is 1025 with 95% segs. Cx sent also.    3/13 Sent to ICU for GI bleed, acute blood loss anemia, Placed Left femoral trialysis catheter  3/13  Started on Fluconazole and Pip-tazo for broader coverage 3/13 by primary team. On intermittent Vancomycin  3/14 2nd EGD done PD catheter removed    IV Vanco intermittently as per levels, Pip tazo and Fluconazole. Off oxacillin and Bactrim  3/16 vanco zosyn and fluconazole were discontinued; cefepime started    Recommendations  --- Seems like the Rothia species seen in 2/21 peritoneal fluid cx taken outpatient has been treated with ceftazidime/vanco and cefazolin  ---cefepime started 3/15 and plan to continue until 14 days past PD catheter removal (3/14/19) so planned stop date  should be around 3/28; dose for regular HD today at 1 gram every 24 h - spoke with dialysis nurse - she does not think patient will get 24 h of CRRT due to catheter difficulties.   ---Need to get repeat peritoneal fluid cultures and cell count, etc sometime this week - recommend IR tap if possible to be sure peritoneal fluid is clearing (send all studies as before and include routine gram stain, aerobic and anaerobic culture, fungal stain and culture and AFB smear and culture )           Anticipated Disposition: unclear at this point; when she transitions to regular HD schedule 3 times a week - can give cefepime 2 grams after each dialysis    Thank you for your consult. I will follow-up with patient. Please contact us if you have any additional questions.[phone]    Carli Farmer MD  Infectious Disease  [facility]    Subjective:     Principal Problem:Peritonitis associated with peritoneal dialysis    HPI: Ms. Jenni Todd is a 48 y.o. F w/ PMH of HTN, DM, PAD, Anemia of chronic disease 2/2 CKD, on PD since 2004 presenting to the ED from [facility] on 2/21/2019  for concerns of peritonitis. She has a h/o peritonitis several times in the past as per records. Patient was in her usual state of health until Sunday 2/17. Patient reported that she first had diarrhea and abdominal cramping. Patient had not been sleeping well and not feeling well since Sunday 2/17.. Patient also reported then diaphoresis, fatigue, subjective fevers and loss of appetite.     At [facility] dialysis center the PD fluid was cloudy and on 2/21 PD fluid sent for Cx from [facility] dialysis [facility] and started on vancomycin and Ceftazidime as outpatient and sent to ED for admission b/o sickness. As per verbal report by nephrology team, the peritoneal fluid cx gre Rothia species and patient was continued on IV Vanco, peritoneal ceftazdime and then on 3/1 added Cefazolin peritoneal with cefazidime till 3/2. Ceftazidime stopped 3/3.  Continued on Vancomycin still as of now at time of consult as per levels, intermittenly receiving IV Vanco. Last level 3/12 vanco level was 26.8.     Patient started to feel again abdominal tenderness, looked again sick and PD fluid cx sent again on 3/1/2019 that grew Enterobacter and MSSA. Pt was started on oxacillin via peritoneum and IV bactrim from 3/5/2019 and IV vancomycin was continued. WBC levels in peritoneal fluids were decreasing and decreased to 71 on 3/9 but now have started to increase again and patient again feeling abdominal tenderness.Repeated fluid cell count 3/12/19 and is 1025 with 95% segs. Cx sent also.    Patient has finally consented and will go on HD for now. Planning to remove PD catheter on 3/13/2019 and placed catheter for HD for now.   ID consult called for further reccs for Antibiotics    Peritoneal Cx data  2/21 Rothia Species as per verbal report  3/1 Enterobacter and MSSA  3/8 Fluid Cx sent Aerobic NGTD. No AFB seen  3/12 Fluid cx sent for aerobic/fungal/AFB=NGTD      Other cx data  2/21 Blood cx no growth  3/1 [facility] cx for Cdiff PCR negative  3/3 Blood cx no growth  3/13 Blood cx NGTD    2/26 TTE showed decreased EF of 30%, no vegetations seen. In 2/2017 EF was 60-65%    ---3/13 Sent to ICU for GI bleed, acute blood loss anemia, to get 3 units PRBCs then EGD then when stable PD cathter removal. Placed Left femoral trialysis catheter  3/14 2nd EGD done shows esophagitis, gastric non bleeding ulcer. PD catheter removed  3/15 was started on CRRT but later had to hold because L femoral line not working. Hb drops in 6s again getting PRBC transfusion 1 unit later may get more. Surgery team called to evaluate trialysis femoral line. Left groin line changed over wire but still not working - CRRT interrupted again; transfused total of 3 units PRBC  3/16 Nephrology evaluating options for HD access - getting longer catheter and planning CRRT today.   3/17 New HD line placed today again due to  nonfunctioning with CRRT over night - now on CRRT  Interval History: Patient had new left femoral PD catheter placed yesterday and it malfunctioned over night and CRRT stopped yesterday; new left femoral HD catheter placed today and now CRRT in progress. Discussed with patient in depth today about antibiotic coverage. Tolerating cefepime today. Patient without complaint of abdominal pain today.     Review of Systems   Respiratory: Negative for shortness of breath.    Gastrointestinal: Positive for diarrhea. Negative for abdominal pain, nausea and vomiting.     Antibiotics (From admission, onward)    Start     Stop Route Frequency Ordered    03/18/19 0200  cefepime in dextrose 5 % 1 gram/50 mL IVPB 1 g      -- IV Daily 03/17/19 0855        Antifungals (From admission, onward)    None        Objective:     Vital Signs (Most Recent):  Temp: 98 °F (36.7 °C) (03/17/19 1200)  Pulse: 103 (03/17/19 1332)  Resp: 20 (03/17/19 1332)  BP: 134/63 (03/17/19 1332)  SpO2: 100 % (03/17/19 1332) Vital Signs (24h Range):  Temp:  [97.6 °F (36.4 °C)-98.4 °F (36.9 °C)] 98 °F (36.7 °C)  Pulse:  [] 103  Resp:  [7-30] 20  SpO2:  [99 %-100 %] 100 %  BP: ()/(49-77) 134/63     Weight: 96.7 kg (213 lb 3 oz)  Body mass index is 33.39 kg/m².    Estimated Creatinine Clearance: 9.9 mL/min (A) (based on SCr of 8.2 mg/dL (H)).    Physical Exam   Cardiovascular: Normal heart sounds.   Pulmonary/Chest: Breath sounds normal. No respiratory distress.   Abdominal: Bowel sounds are normal. She exhibits no distension. There is no tenderness.   2 bandages on abdomen s/p PD catheter removal   Musculoskeletal: She exhibits no edema.       Significant Labs:   Blood Culture:   Recent Labs   Lab 02/21/19  1847 02/21/19  1901 03/03/19  1625 03/13/19  1152 03/13/19  1203   LABBLOO No growth after 5 days. No growth after 5 days. No growth after 5 days. No Growth to date  No Growth to date  No Growth to date  No Growth to date No Growth to date  No  Growth to date  No Growth to date  No Growth to date     CBC:   Recent Labs   Lab 03/15/19  2239 03/16/19  0424 03/17/19  0521   WBC  --  9.58 9.41   HGB 8.5* 9.7* 9.9*   HCT 26.7* 29.8* 31.1*   PLT  --  188 237     CMP:   Recent Labs   Lab 03/16/19  0424 03/17/19  0521   *  133* 134*   K 4.3  4.3 5.0     100 104   CO2 21*  21* 19*   *  153* 131*   BUN 43*  43* 44*   CREATININE 7.5*  7.5* 8.2*   CALCIUM 7.4*  7.4* 7.7*   PROT 5.5* 6.1   ALBUMIN 1.7*  1.7* 1.7*   BILITOT 0.3 0.2   ALKPHOS 86 80   AST 18 20   ALT <5* 5*   ANIONGAP 12  12 11   EGFRNONAA 6*  6* 5*     Wound Culture:   Recent Labs   Lab 03/08/19  1729 03/12/19  1431   LABAERO No growth No growth       Significant Imaging:   3/17 CT Abdomen and pelvis ordered  3/17 US vein mapping -   Impression       1. Positive nonocclusive deep venous thrombosis in the left jugular vein.  2. No deep venous thrombosis on the right although a small portion of the jugular vein is obscured by bandaging.  3. Measurements as above for venous mapping

## 2019-03-17 NOTE — NURSING
Jude SKINNER returned call for consult, pt hx and information given to MD, requested vein mapping report be faxed to office, MD will see pt tomorrow(3/18).

## 2019-03-17 NOTE — PT/OT/SLP PROGRESS
Occupational Therapy      Patient Name:  Jenni Todd   MRN:  [id number]    Patient not seen today secondary to in HD. Will follow-up as able.    Belgica Flower OT  3/17/2019

## 2019-03-17 NOTE — ASSESSMENT & PLAN NOTE
Ms. Jenni Todd is a 48 y.o. F w/ PMH of HTN, DM, PAD, Anemia of chronic disease 2/2 CKD, on PD since 2004 presenting to the ED from [facility] on 2/21/2019  for concerns of peritonitis. She has a h/o peritonitis several times in the past as per records. 2/21 grew Rothia species outpt. Had been on Vancomycin since 2/21 with peritoneal ceftazdime and then on 3/1 added Cefazolin peritoneal with cefazidime till 3/2. Ceftazidime stopped 3/3. Continued on Vancomycin.  PD fluid cx sent again on 3/1/2019 that grew Enterobacter and MSSA. Pt was started on oxacillin via peritoneum and IV bactrim from 3/5/2019 and IV vancomycin was continued. WBC levels in peritoneal fluids were decreasing and decreased to 71 on 3/9 but now have started to increase again and patient again feeling abdominal tenderness.Repeated fluid cell count 3/12/19 and is 1025 with 95% segs. Cx sent also.    3/13 Sent to ICU for GI bleed, acute blood loss anemia, Placed Left femoral trialysis catheter  3/13  Started on Fluconazole and Pip-tazo for broader coverage 3/13 by primary team. On intermittent Vancomycin  3/14 2nd EGD done PD catheter removed    IV Vanco intermittently as per levels, Pip tazo and Fluconazole. Off oxacillin and Bactrim  3/16 vanco zosyn and fluconazole were discontinued; cefepime started    Recommendations  --- Seems like the Rothia species seen in 2/21 peritoneal fluid cx taken outpatient has been treated with ceftazidime/vanco and cefazolin  ---cefepime started 3/15 and plan to continue until 14 days past PD catheter removal (3/14/19) so planned stop date should be around 3/28; dose for regular HD today at 1 gram every 24 h - spoke with dialysis nurse - she does not think patient will get 24 h of CRRT due to catheter difficulties.   ---Need to get repeat peritoneal fluid cultures and cell count, etc sometime this week - recommend IR tap if possible to be sure peritoneal fluid is clearing (send all studies as before and include  routine gram stain, aerobic and anaerobic culture, fungal stain and culture and AFB smear and culture )

## 2019-03-17 NOTE — PT/OT/SLP PROGRESS
Physical Therapy   Missed Visit Note      Patient Name:  Jenni Todd   MRN:  [id number]    Patient not seen today secondary to having HD at this time. Will follow-up as appropriate.    Mirian Louis, PT

## 2019-03-18 PROBLEM — K25.0 ACUTE GASTRIC ULCER WITH HEMORRHAGE: Status: ACTIVE | Noted: 2019-01-01

## 2019-03-18 PROBLEM — T36.95XA ANTIBIOTIC-ASSOCIATED DIARRHEA: Status: ACTIVE | Noted: 2019-01-01

## 2019-03-18 PROBLEM — K52.1 ANTIBIOTIC-ASSOCIATED DIARRHEA: Status: ACTIVE | Noted: 2019-01-01

## 2019-03-18 PROBLEM — I50.22 CHRONIC SYSTOLIC HEART FAILURE: Status: ACTIVE | Noted: 2019-01-01

## 2019-03-18 PROBLEM — T82.41XA HEMODIALYSIS CATHETER DYSFUNCTION: Status: ACTIVE | Noted: 2019-01-01

## 2019-03-18 NOTE — NURSING
7 beat run of v tach noted on cardiac monitoring, HR 86, No distress noted, MD Bridgette notified.

## 2019-03-18 NOTE — CONSULTS
Nephrology Consult       Consult Requested By: Mandeep Angeles*  Reason for Consult: permcath     SUBJECTIVE:     History of Present Illness:  Patient is a 48 y.o. female ESRD on PD presented to [facility] with peritonitis s/p PD catheter removal. Transferred for HD access and [facility] was consulted for permacath placement.     PTA Medications   Medication Sig    amLODIPine (NORVASC) 5 MG tablet Take 1 tablet (5 mg total) by mouth once daily.    aspirin (ECOTRIN) 81 MG EC tablet Take 1 tablet (81 mg total) by mouth once daily.    atorvastatin (LIPITOR) 40 MG tablet Take 40 mg by mouth every evening.     calcitRIOL (ROCALTROL) 0.5 MCG Cap Take 1 capsule by mouth every morning.     carvedilol (COREG) 3.125 MG tablet Take 1 tablet (3.125 mg total) by mouth 2 (two) times daily.    cinacalcet (SENSIPAR) 90 MG Tab Take 1 tablet by mouth every evening.     clopidogrel (PLAVIX) 75 mg tablet Take 1 tablet (75 mg total) by mouth once daily.    epoetin adonay 10,000 unit/mL Soln 1 mL, epoetin adonay 20,000 unit/mL Soln 1 mL Inject 30,000 Units into the vein every 14 (fourteen) days.    gabapentin (NEURONTIN) 100 MG capsule 1 capsule 3 (three) times daily.    gabapentin (NEURONTIN) 300 MG capsule TAKE 3 CAPSULES (900 MG TOTAL) BY MOUTH 3 (THREE) TIMES A DAY.    heparin sodium,porcine (HEPARIN, PORCINE,) 5,000 unit/mL injection Inject 1.6 mLs (8,000 Units total) into the skin every 12 (twelve) hours.    HUMULIN R REGULAR U-100 INSULN 100 unit/mL injection Inject 200 units into dialysis bag every evening.    nateglinide (STARLIX) 120 MG tablet STARLIX 120MG 30 MINUTES BEFORE EACH MEAL.    ONETOUCH VERIO Strp USE 1 STRIP ONCE DAILY IN VITRO    PLAVIX 75 mg tablet TAKE 1 BY MOUTH DAILY (Patient taking differently: TAKE 1 BY MOUTH EVERY MORNING)    sevelamer carbonate (RENVELA) 800 mg Tab Take 3 to 4 tablets by mouth twice daily as needed    vitamin renal formula, B-complex-vitamin c-folic acid, (B  COMPLEX-C-FOLIC ACID) 1 mg Cap Take 1 capsule by mouth once daily. 1 Capsule Oral Every day       Review of patient's allergies indicates:   Allergen Reactions    Clindamycin Diarrhea    Flagyl [metronidazole hcl]     Metronidazole         Past Medical History:   Diagnosis Date    Abnormal finding on Pap smear, HPV DNA positive     Anemia associated with chronic renal failure     on Epogen    Blood type B+     Bulging discs - symptomatic      CAD (coronary artery disease)     Cardiomyopathy 3/13/2019    Diabetes mellitus, type 2     ESRD (end stage renal disease) 2004    FSGS (focal segmental glomerulosclerosis)     with collapsing    Hyperlipidemia     Hypertension     Neuropathy     Obesity     Secondary hyperparathyroidism, renal     TIA (transient ischemic attack)     Uterine fibroid     small uterine      Past Surgical History:   Procedure Laterality Date    CARDIAC CATHETERIZATION      PCI x 2     SECTION, CLASSIC      COLONOSCOPY N/A 2018    [facility] (2ND FLR)    DEBRIDEMENT-WOUND Left 2016    [facility] by [facility] at [facility] OR    DIALYSIS FISTULA CREATION      multiple fistulas and grafts before PD     EGD (ESOPHAGOGASTRODUODENOSCOPY) N/A 3/14/2019    Performed by [facility] at [facility] ENDO    EGD (ESOPHAGOGASTRODUODENOSCOPY) N/A 3/13/2019    [facility]formed by [facility] at [facility] ENDO    INCISION AND DRAINAGE (I&D), LABIAL N/A 2016    Performed by Harmony Fernandez MD at [facility] OR    INCISION AND DRAINAGE (I&D), LABIAL (ADD ON) Left 2016    Performed by [facility]a [facility]ox MD at [facility] OR    INCISION AND DRAINAGE OF WOUND Left     VULVAR ABCESS WITH NECROSIS    INSERTION, CATHETER, TUNNELED ABORTED Left 3/14/2019    [facility]formed by [facility] at [facility] OR    ORIF, HIP Left 2018    Performed by Tevin Grullon MD at [facility] OR    PERITONEAL CATHETER INSERTION       PERMCATH REWIRE- TUNNELED CATH REWIRE Left 11/13/2017    [facility] by [facility] at [facility]ATH LAB    PERMCATH REWIRE- TUNNELED CATH REWIRE N/A 10/5/2017    [facility]formed by [facility] at [facility] CATH LAB    REMOVAL, CATHETER, DIALYSIS, PERITONEAL N/A 3/14/2019    [facility]    UMBILICAL HERNIA REPAIR       Family History   Problem Relation Age of Onset    Cancer Maternal Grandmother     Cancer Paternal Grandfather     Diabetes Maternal Aunt     Diabetes Paternal Aunt     Kidney disease Neg Hx     Heart disease Neg Hx     Ovarian cancer Neg Hx     Breast cancer Neg Hx      Social History     Tobacco Use    Smoking status: Never Smoker    Smokeless tobacco: Never Used   Substance Use Topics    Alcohol use: No     Comment: Pt reports some social use of about 1-2 drinks about every six months.    Drug use: No       Review of Systems:  Constitutional: Negative for chills and fever.   HENT: Negative for rhinorrhea, sore throat and trouble swallowing.    Eyes: Negative for photophobia and visual disturbance.   Respiratory: Negative for cough and shortness of breath.    Cardiovascular: Negative for chest pain, palpitations and leg swelling.   Gastrointestinal: Positive for abdominal distention and diarrhea. Negative for abdominal pain, nausea and vomiting.   Genitourinary: Negative for dysuria and hematuria.   Musculoskeletal: Negative for arthralgias and myalgias.   Skin: Negative for rash and wound.   Neurological: Negative for dizziness, syncope and headaches.   Psychiatric/Behavioral: Negative for agitation and confusion.         OBJECTIVE:     Vital Signs (Most Recent)  Temp: 97.4 °F (36.3 °C) (03/18/19 0700)  Pulse: 99 (03/18/19 0800)  Resp: 20 (03/18/19 0800)  BP: (!) 159/80 (03/18/19 0800)  SpO2: 100 % (03/18/19 0800)       No intake or output data in the 24 hours ending 03/18/19 0939    Physical Exam:  Gen: AAOx3, NAD  HEENT: mmm  Neck: no bruit, no JVD  CV:  RRR, no m/r  Resp: CTAx2, normal effort  GI: soft, ND,  Mild TTP, +BS, + dressing  Extr: no LE edema  Neuro: normal reflexes, no focal deficits  Access: left femoral trialysis     Laboratory:  CBC with Diff:   Recent Labs   Lab 03/16/19  0424 03/17/19  0521 03/18/19  0224   WBC 9.58 9.41 8.97   HGB 9.7* 9.9* 10.4*   HCT 29.8* 31.1* 32.3*    237 274   LYMPH 18.9  1.8 19.4  1.8 19.6  1.8   MONO 5.7  0.6 5.7  0.5 6.0  0.5   EOSINOPHIL 1.4 2.9 2.1     COAG:  Recent Labs   Lab 03/13/19  0651 03/16/19  0424 03/18/19  0310   INR 1.7* 1.3* 1.6*       CMP:   Recent Labs   Lab 03/16/19  0424 03/16/19  0843 03/17/19  0521 03/17/19  1715 03/18/19  0224   *  153*  --  131* 103 85   CALCIUM 7.4*  7.4*  --  7.7* 7.6* 7.9*   ALBUMIN 1.7*  1.7*  --  1.7*  --  1.7*   PROT 5.5*  --  6.1  --  6.1   *  133*  --  134* 133* 133*   K 4.3  4.3  --  5.0 4.1 4.4   CO2 21*  21*  --  19* 22* 19*     100  --  104 99 100   BUN 43*  43*  --  44* 31* 33*   CREATININE 7.5*  7.5*  --  8.2* 6.6* 7.3*   ALKPHOS 86  --  80  --  88   ALT <5*  --  5*  --  <5*   AST 18  --  20  --  14   BILITOT 0.3  --  0.2  --  0.2   MG 2.0 2.2 2.5  --  1.7   PHOS 4.6*  4.6*  --  4.5  --  4.5       Lab Results   Component Value Date    LABPROT 15.8 (H) 03/18/2019     Lab Results   Component Value Date    CALCIUM 7.9 (L) 03/18/2019    PHOS 4.5 03/18/2019     Lab Results   Component Value Date    IRON 20 (L) 03/18/2019    TIBC 135 (L) 03/18/2019    FERRITIN 833 (H) 03/18/2019         ABG  Recent Labs   Lab 03/13/19  0939   PH 7.319*   PO2 17*   PCO2 44.5   HCO3 22.9*   BE -3         Diagnostic Results:  Labs: Reviewed      Scheduled Meds:   aspirin  81 mg Oral Daily    carvedilol  12.5 mg Oral BID    ceFEPime (MAXIPIME) IVPB  1 g Intravenous Daily    pantoprazole  40 mg Oral Daily    sevelamer carbonate  800 mg Oral TID WM    sodium bicarbonate  650 mg Oral BID     Continuous Infusions:          ASSESSMENT/PLAN:     Patient  Active Problem List   Diagnosis    Neuropathy    ESRD (end stage renal disease) on PD ( initiated dialysis 04/20/2004)    FSGS (focal segmental glomerulosclerosis) biopsy proven with collapsing features    Anemia in chronic kidney disease, on chronic dialysis    Hypertension    Hyperlipidemia    Obesity    CAD (coronary artery disease)    Diabetes mellitus, type 2    Awaiting organ transplant status    Bulging discs - symptomatic     Spinal stenosis of sacral and sacrococcygeal region    Hypertensive renal disease    Hypertension associated with diabetes    Hypoalbuminemia    Cerebral infarction due to embolism of middle cerebral artery    Gait abnormality    Chronic low back pain    DDD (degenerative disc disease), lumbar    PAD (peripheral artery disease)    Gastrointestinal hemorrhage with hematemesis    Peritonitis associated with peritoneal dialysis    Peritonitis due to infected peritoneal dialysis catheter    Abdominal pain    Occult GI bleeding    Hypotension due to hypovolemia    Nausea and vomiting    Cardiomyopathy    Chronic systolic heart failure    Acute gastric ulcer with hemorrhage    Antibiotic-associated diarrhea       Plan:   Will place tunneled HD catheter

## 2019-03-18 NOTE — Clinical Note
20 ml injected throughout the case. 180 mL total wasted during the case. 200 mL total used in the case.

## 2019-03-18 NOTE — PT/OT/SLP PROGRESS
Physical Therapy      Patient Name:  Jenni Todd   MRN:  [id number]    Orders received for PT evaluation. Patient not seen today secondary to pt preparing to go to Cath Lab for Perma Cath placement upon PT attempt. Will follow up at next scheduled visit.     Radha Hernandez, PT, DPT   3/18/2019

## 2019-03-18 NOTE — LETTER
[facility]  [address] [place]  [address]  Phone: [phone]  Fax: [phone] April 4, 2019     Patient: Jenni Todd   YOB: 1970       To Whom It May Concern:    Jenni Todd was admitted to our hospital recently and, she may return to work end of May 2019 (5/31/19) with light duty .  If you have any questions or concerns, please don't hesitate to call the hospital and reach out to me.      Sincerely,            Guevara Metzger MD  Department of Hospital Medicine

## 2019-03-18 NOTE — RESIDENT HANDOFF
Handoff     Primary Team: [facility] CRITICAL CARE MEDICINE Room Number: [id number]     Patient Name: Jenni Todd MRN: [id number]     Date of Birth: [id number] Allergies: Clindamycin; Flagyl [metronidazole hcl]; and Metronidazole     Age: 48 y.o. Admit Date: 3/18/2019     Sex: female  BMI: Body mass index is 32.15 kg/m².     Code Status: Full Code        Illness Level (current clinical status): Watcher - No    Reason for Admission: ESRD (end stage renal disease)    Brief HPI (pertinent PMH and diagnosis or differential diagnosis): 48 F with ESRD, formerly on PD. PD catheter removed for recurrent peritonitis. Unable to get permanent access at [facility] so transferred to [facility]. Vascular Surgery requesting Nephrology Access place perm cath.     Course complicated by GIB with low risk ulcer. On PPI.     Procedure Date: EGD 3/13, 3/14 at [facility]. Nick Perm cath in s attempted 3/13, unsuccessful.    Hospital Course (updated, brief assessment by system or problem, significant events):     ESRD- has fem trialysis. Working but slow. Needs permanent access    CAD- remote stents. Was on DAPT, continuing on Asa    GIB- no more bleeding, on PPI    ID- cefepime until 3/28. Renal dosing.     Tasks (specific, using if-then statements):   F/u with Nephrology for access    Contingency Plan (special circumstances anticipated and plan): If [facility] unable to place line, call vascular surgery again.     Estimated Discharge Date: 3/20    Discharge Disposition: Home or Self Care    Mentored By: Dr. Angeles

## 2019-03-18 NOTE — Clinical Note
A venogram was performed on the right lower arm. Injected with multiple hand injections. 20cc of gastro.

## 2019-03-18 NOTE — CONSULTS
[facility]  Vascular Surgery  Consult Note    Inpatient consult to Vascular Surgery  Consult performed by: Rodrigue Echeverria MD  Consult ordered by: Teressa Horton NP        Subjective:     Chief Complaint/Reason for Admission: HD access     History of Present Illness: Jenni Todd is a 48 y.o. F with hx of CAD s/p 2 stents, HTN, DM, and ESRD on home PD.  Was admitted to [facility] on 2/21 for peritonitis where her PD catheter was removed.  She was then transitioned to HD.  Bilateral IJ tunneled catheters were attempted without success.  She now has a temporary dialysis catheter in her left groin.  She was transferred to [facility] for management.  [facility] attempted a tunneled line today and were unsuccessful.  Vascular surgery was consulted for HD access.      She is on plavix at home but it has been held since admission to [facility] due to suspicion for GI bleed.  She has had multiple tunneled IJ catheters bilaterally as well as a left arm AVF and AVG.  She has not had anything on the right.             Medications Prior to Admission   Medication Sig Dispense Refill Last Dose    amLODIPine (NORVASC) 5 MG tablet Take 1 tablet (5 mg total) by mouth once daily. 30 tablet 11 2/21/2019    aspirin (ECOTRIN) 81 MG EC tablet Take 1 tablet (81 mg total) by mouth once daily. 30 tablet 12 2/21/2019    atorvastatin (LIPITOR) 40 MG tablet Take 40 mg by mouth every evening.    2/21/2019    calcitRIOL (ROCALTROL) 0.5 MCG Cap Take 1 capsule by mouth every morning.    2/21/2019    carvedilol (COREG) 3.125 MG tablet Take 1 tablet (3.125 mg total) by mouth 2 (two) times daily. 60 tablet 11 2/21/2019    cinacalcet (SENSIPAR) 90 MG Tab Take 1 tablet by mouth every evening.    2/21/2019    clopidogrel (PLAVIX) 75 mg tablet Take 1 tablet (75 mg total) by mouth once daily. 30 tablet 11 2/21/2019    epoetin adonay 10,000 unit/mL Soln 1 mL, epoetin adonay 20,000 unit/mL Soln 1 mL Inject 30,000 Units into the vein  every 14 (fourteen) days.   2/21/2019    gabapentin (NEURONTIN) 100 MG capsule 1 capsule 3 (three) times daily.   2/21/2019    gabapentin (NEURONTIN) 300 MG capsule TAKE 3 CAPSULES (900 MG TOTAL) BY MOUTH 3 (THREE) TIMES A DAY. 90 capsule 3     heparin sodium,porcine (HEPARIN, PORCINE,) 5,000 unit/mL injection Inject 1.6 mLs (8,000 Units total) into the skin every 12 (twelve) hours. 10 mL 1 2/21/2019    HUMULIN R REGULAR U-100 INSULN 100 unit/mL injection Inject 200 units into dialysis bag every evening.   2/21/2019    nateglinide (STARLIX) 120 MG tablet STARLIX 120MG 30 MINUTES BEFORE EACH MEAL.   2/21/2019    ONETOUCH VERIO Strp USE 1 STRIP ONCE DAILY IN VITRO  3 2/21/2019    PLAVIX 75 mg tablet TAKE 1 BY MOUTH DAILY (Patient taking differently: TAKE 1 BY MOUTH EVERY MORNING) 90 tablet 0 2/21/2019    sevelamer carbonate (RENVELA) 800 mg Tab Take 3 to 4 tablets by mouth twice daily as needed   2/21/2019    vitamin renal formula, B-complex-vitamin c-folic acid, (B COMPLEX-C-FOLIC ACID) 1 mg Cap Take 1 capsule by mouth once daily. 1 Capsule Oral Every day   2/21/2019       Review of patient's allergies indicates:   Allergen Reactions    Clindamycin Diarrhea    Flagyl [metronidazole hcl]     Metronidazole        Past Medical History:   Diagnosis Date    Abnormal finding on Pap smear, HPV DNA positive 2014    Anemia associated with chronic renal failure     on Epogen    Blood type B+     Bulging discs - symptomatic      CAD (coronary artery disease)     Cardiomyopathy 3/13/2019    Diabetes mellitus, type 2     ESRD (end stage renal disease) 04/20/2004    FSGS (focal segmental glomerulosclerosis)     with collapsing    Hyperlipidemia     Hypertension 2004    Neuropathy     Obesity     Secondary hyperparathyroidism, renal     TIA (transient ischemic attack)     Uterine fibroid     small uterine      Past Surgical History:   Procedure Laterality Date    CARDIAC CATHETERIZATION      PCI x 2      SECTION, CLASSIC      COLONOSCOPY N/A 2018    Performed by [facility]ue Russell MD at [facility] ENDO (2ND FLR)    DEBRIDEMENT-WOUND Left 2016    [facility]formed by [facility]ox [facility] at [facility] OR    DIALYSIS FISTULA CREATION      multiple fistulas and grafts before PD     EGD (ESOPHAGOGASTRODUODENOSCOPY) N/A 3/14/2019    Performed by [facility] at [facility] ENDO    EGD (ESOPHAGOGASTRODUODENOSCOPY) N/A 3/13/2019    Performed by Adolph [facility]ila MD at [facility] ENDO    INCISION AND DRAINAGE (I&D), LABIAL N/A 2016    Performed by Harmony Fernandez MD at [facility] OR    INCISION AND DRAINAGE (I&D), LABIAL (ADD ON) Left 2016    Performed by [facility]a Maddox MD at [facility] OR    INCISION AND DRAINAGE OF WOUND Left     VULVAR ABCESS WITH NECROSIS    INSERTION, CATHETER, TUNNELED ABORTED Left 3/14/2019    Performed by Servando [facility]is [facility] at [facility] OR    ORIF, HIP Left 2018    Performed by Tevin Grullon MD at [facility] OR    PERITONEAL CATHETER INSERTION      PERMCATH REWIRE- TUNNELED CATH REWIRE Left 2017    Performed by [facility]dev Munroe MD at [facility] CATH LAB    PERMCATH REWIRE- TUNNELED CATH REWIRE N/A 10/5/2017    Performed by [facility]dev [facility]roe MD at [facility] CATH LAB    REMOVAL, CATHETER, DIALYSIS, PERITONEAL N/A 3/14/2019    Performed by [facility] at [facility] OR    UMBILICAL HERNIA REPAIR       Family History     Problem Relation (Age of Onset)    Cancer Maternal Grandmother, Paternal Grandfather    Diabetes Maternal Aunt, Paternal Aunt        Tobacco Use    Smoking status: Never Smoker    Smokeless tobacco: Never Used   Substance and Sexual Activity    Alcohol use: No     Comment: Pt reports some social use of about 1-2 drinks about every six months.    Drug use: No    Sexual activity: Not Currently     Partners: Male     Birth control/protection: None     Review of Systems   Constitutional: Negative for chills and fever.   HENT: Negative for congestion.    Respiratory:  Negative for chest tightness and shortness of breath.    Cardiovascular: Negative for chest pain and palpitations.   Gastrointestinal: Positive for abdominal pain and diarrhea. Negative for constipation, nausea and vomiting.   Musculoskeletal: Negative for arthralgias.   Neurological: Negative for seizures, syncope and numbness.   Psychiatric/Behavioral: Negative for agitation and confusion.     Objective:     Vital Signs (Most Recent):  Temp: 97.4 °F (36.3 °C) (03/18/19 0700)  Pulse: 94 (03/18/19 1000)  Resp: (!) 27 (03/18/19 1000)  BP: (!) 154/72 (03/18/19 1000)  SpO2: 100 % (03/18/19 1000) Vital Signs (24h Range):  Temp:  [97.4 °F (36.3 °C)-98.3 °F (36.8 °C)] 97.4 °F (36.3 °C)  Pulse:  [] 94  Resp:  [12-28] 27  SpO2:  [96 %-100 %] 100 %  BP: (121-161)/() 154/72     Weight: 93.1 kg (205 lb 4 oz)  Body mass index is 32.15 kg/m².    Physical Exam   Constitutional: She is oriented to person, place, and time. She appears well-developed and well-nourished. No distress.   HENT:   Head: Normocephalic and atraumatic.   Eyes: EOM are normal. Pupils are equal, round, and reactive to light.   Neck:   Bruising bilaterally at IJ attempt sites   Cardiovascular: Normal rate.   Pulmonary/Chest: Effort normal. No respiratory distress.   Abdominal: Soft. She exhibits no distension. There is no tenderness.   Musculoskeletal:   2+ radial pulses    Neurological: She is alert and oriented to person, place, and time.   Psychiatric: She has a normal mood and affect.       Significant Labs:  CBC:   Recent Labs   Lab 03/18/19 0224   WBC 8.97   RBC 3.37*   HGB 10.4*   HCT 32.3*      MCV 96   MCH 30.9   MCHC 32.2     CMP:   Recent Labs   Lab 03/18/19 0224   GLU 85   CALCIUM 7.9*   ALBUMIN 1.7*   PROT 6.1   *   K 4.4   CO2 19*      BUN 33*   CREATININE 7.3*   ALKPHOS 88   ALT <5*   AST 14   BILITOT 0.2     Coagulation:   Recent Labs   Lab 03/18/19  0310   LABPROT 15.8*   INR 1.6*       Significant  Diagnostics:  Reviewed    Assessment/Plan:     * ESRD (end stage renal disease) on PD ( initiated dialysis 04/20/2004)    Tentatively plan for Right arm AV graft insertion on Wednesday in OR         Thank you for your consult. I will follow-up with patient. Please contact us if you have any additional questions.    Rodrigue Echeverria MD  Vascular Surgery  [facility]

## 2019-03-18 NOTE — SUBJECTIVE & OBJECTIVE
Medications Prior to Admission   Medication Sig Dispense Refill Last Dose    amLODIPine (NORVASC) 5 MG tablet Take 1 tablet (5 mg total) by mouth once daily. 30 tablet 11 2/21/2019    aspirin (ECOTRIN) 81 MG EC tablet Take 1 tablet (81 mg total) by mouth once daily. 30 tablet 12 2/21/2019    atorvastatin (LIPITOR) 40 MG tablet Take 40 mg by mouth every evening.    2/21/2019    calcitRIOL (ROCALTROL) 0.5 MCG Cap Take 1 capsule by mouth every morning.    2/21/2019    carvedilol (COREG) 3.125 MG tablet Take 1 tablet (3.125 mg total) by mouth 2 (two) times daily. 60 tablet 11 2/21/2019    cinacalcet (SENSIPAR) 90 MG Tab Take 1 tablet by mouth every evening.    2/21/2019    clopidogrel (PLAVIX) 75 mg tablet Take 1 tablet (75 mg total) by mouth once daily. 30 tablet 11 2/21/2019    epoetin adonay 10,000 unit/mL Soln 1 mL, epoetin adonay 20,000 unit/mL Soln 1 mL Inject 30,000 Units into the vein every 14 (fourteen) days.   2/21/2019    gabapentin (NEURONTIN) 100 MG capsule 1 capsule 3 (three) times daily.   2/21/2019    gabapentin (NEURONTIN) 300 MG capsule TAKE 3 CAPSULES (900 MG TOTAL) BY MOUTH 3 (THREE) TIMES A DAY. 90 capsule 3     heparin sodium,porcine (HEPARIN, PORCINE,) 5,000 unit/mL injection Inject 1.6 mLs (8,000 Units total) into the skin every 12 (twelve) hours. 10 mL 1 2/21/2019    HUMULIN R REGULAR U-100 INSULN 100 unit/mL injection Inject 200 units into dialysis bag every evening.   2/21/2019    nateglinide (STARLIX) 120 MG tablet STARLIX 120MG 30 MINUTES BEFORE EACH MEAL.   2/21/2019    ONETOUCH VERIO Strp USE 1 STRIP ONCE DAILY IN VITRO  3 2/21/2019    PLAVIX 75 mg tablet TAKE 1 BY MOUTH DAILY (Patient taking differently: TAKE 1 BY MOUTH EVERY MORNING) 90 tablet 0 2/21/2019    sevelamer carbonate (RENVELA) 800 mg Tab Take 3 to 4 tablets by mouth twice daily as needed   2/21/2019    vitamin renal formula, B-complex-vitamin c-folic acid, (B COMPLEX-C-FOLIC ACID) 1 mg Cap Take 1 capsule by mouth  once daily. 1 Capsule Oral Every day   2019       Review of patient's allergies indicates:   Allergen Reactions    Clindamycin Diarrhea    Flagyl [metronidazole hcl]     Metronidazole        Past Medical History:   Diagnosis Date    Abnormal finding on Pap smear, HPV DNA positive     Anemia associated with chronic renal failure     on Epogen    Blood type B+     Bulging discs - symptomatic      CAD (coronary artery disease)     Cardiomyopathy 3/13/2019    Diabetes mellitus, type 2     ESRD (end stage renal disease) 2004    FSGS (focal segmental glomerulosclerosis)     with collapsing    Hyperlipidemia     Hypertension     Neuropathy     Obesity     Secondary hyperparathyroidism, renal     TIA (transient ischemic attack)     Uterine fibroid     small uterine      Past Surgical History:   Procedure Laterality Date    CARDIAC CATHETERIZATION      PCI x 2     SECTION, CLASSIC      COLONOSCOPY N/A 2018    Performed by Rodrigue Russell MD at [facility] ENDO (2ND FLR)    DEBRIDEMENT-WOUND Left 2016    Performed by [facility]ressa Maddox MD at [facility] OR    DIALYSIS FISTULA CREATION      multiple fistulas and grafts before PD     EGD (ESOPHAGOGASTRODUODENOSCOPY) N/A 3/14/2019    Per[facility] by [facility] at [facility] ENDO    EGD (ESOPHAGOGASTRODUODENOSCOPY) N/A 3/13/2019    Performed by [facility] at [facility] ENDO    INCISION AND DRAINAGE (I&D), LABIAL N/A 2016    Performed by Harmony Fernandez MD at [facility] OR    INCISION AND DRAINAGE (I&D), LABIAL (ADD ON) Left 2016    Performed by Teressa Maddox MD at [facility] OR    INCISION AND DRAINAGE OF WOUND Left     VULVAR ABCESS WITH NECROSIS    INSERTION, CATHETER, TUNNELED ABORTED Left 3/14/2019    Performed by Servando Solis MD at [facility] OR    ORIF, HIP Left 2018    Performed by Tevin Grullon MD at [facility] OR    PERITONEAL CATHETER INSERTION      PERMCATH REWIRE- TUNNELED CATH REWIRE Left  11/13/2017    Performed by [facility]roe [facility] at [facility] CATH LAB    PERMCATH REWIRE- TUNNELED CATH REWIRE N/A 10/5/2017    Performed by [facility]dev [facility]roe MD at [facility] CATH LAB    REMOVAL, CATHETER, DIALYSIS, PERITONEAL N/A 3/14/2019    [facility]formed [facility]    UMBILICAL HERNIA REPAIR       Family History     Problem Relation (Age of Onset)    Cancer Maternal Grandmother, Paternal Grandfather    Diabetes Maternal Aunt, Paternal Aunt        Tobacco Use    Smoking status: Never Smoker    Smokeless tobacco: Never Used   Substance and Sexual Activity    Alcohol use: No     Comment: Pt reports some social use of about 1-2 drinks about every six months.    Drug use: No    Sexual activity: Not Currently     Partners: Male     Birth control/protection: None     Review of Systems   Constitutional: Negative for chills and fever.   HENT: Negative for congestion.    Respiratory: Negative for chest tightness and shortness of breath.    Cardiovascular: Negative for chest pain and palpitations.   Gastrointestinal: Positive for abdominal pain and diarrhea. Negative for constipation, nausea and vomiting.   Musculoskeletal: Negative for arthralgias.   Neurological: Negative for seizures, syncope and numbness.   Psychiatric/Behavioral: Negative for agitation and confusion.     Objective:     Vital Signs (Most Recent):  Temp: 97.4 °F (36.3 °C) (03/18/19 0700)  Pulse: 94 (03/18/19 1000)  Resp: (!) 27 (03/18/19 1000)  BP: (!) 154/72 (03/18/19 1000)  SpO2: 100 % (03/18/19 1000) Vital Signs (24h Range):  Temp:  [97.4 °F (36.3 °C)-98.3 °F (36.8 °C)] 97.4 °F (36.3 °C)  Pulse:  [] 94  Resp:  [12-28] 27  SpO2:  [96 %-100 %] 100 %  BP: (121-161)/() 154/72     Weight: 93.1 kg (205 lb 4 oz)  Body mass index is 32.15 kg/m².    Physical Exam   Constitutional: She is oriented to person, place, and time. She appears well-developed and well-nourished. No distress.   HENT:   Head: Normocephalic and atraumatic.    Eyes: EOM are normal. Pupils are equal, round, and reactive to light.   Neck:   Bruising bilaterally at IJ attempt sites   Cardiovascular: Normal rate.   Pulmonary/Chest: Effort normal. No respiratory distress.   Abdominal: Soft. She exhibits no distension. There is no tenderness.   Musculoskeletal:   2+ radial pulses    Neurological: She is alert and oriented to person, place, and time.   Psychiatric: She has a normal mood and affect.       Significant Labs:  CBC:   Recent Labs   Lab 03/18/19 0224   WBC 8.97   RBC 3.37*   HGB 10.4*   HCT 32.3*      MCV 96   MCH 30.9   MCHC 32.2     CMP:   Recent Labs   Lab 03/18/19 0224   GLU 85   CALCIUM 7.9*   ALBUMIN 1.7*   PROT 6.1   *   K 4.4   CO2 19*      BUN 33*   CREATININE 7.3*   ALKPHOS 88   ALT <5*   AST 14   BILITOT 0.2     Coagulation:   Recent Labs   Lab 03/18/19  0310   LABPROT 15.8*   INR 1.6*       Significant Diagnostics:  Reviewed

## 2019-03-18 NOTE — Clinical Note
The site was marked. Prepped: right brachial. Prepped with: ChloraPrep. The patient was draped. Right arm

## 2019-03-18 NOTE — PT/OT/SLP PROGRESS
Occupational Therapy      Patient Name:  Jenni Todd   MRN:  [id number]    Patient not seen today secondary pt preparing to go to Cath Lab for Perma Cath placement. OT to check status at later date.     SABRA Mahoney  3/18/2019

## 2019-03-18 NOTE — H&P
[facility]  Critical Care Medicine  History & Physical    Patient Name: Jenni Todd  MRN: [id number]  Admission Date: 3/18/2019  Hospital Length of Stay: 0 days  Code Status: Full Code  Attending Physician: Dusty Walton MD   Primary Care Provider: Michael Tan Iii, MD   Principal Problem: ESRD (end stage renal disease)    Subjective:     HPI:  48 F with ESRD previously on PD since 2005, CAD s/p 2 stents in 2012 on DAPT, HTN, DM on home PD insulin presented to [facility] on 2/21 with abdominal pain after being referred by dialysis nurse for concern about peritonitis. She has had multiple episodes of peritonitis in the past, last in 2017. Grew out multiple organisms including Rothia sp, MSSA and enterobacter. She was treated with PD antibiotics and switched to cefepime on 3/15. Decision was made to remove PD catheter due to recurrent infection, performed on 3/14. Attempts at tunneled catheter in bilateral IJs unsuccessful due to inability to advance guidewire. Femoral trialysis placed and has been used multiple times although flow has been sluggish. Last use yesterday. Patient was transferred for vascular surgery evaluation of access for HD.     Hospital Course complicated by upper GI bleed. Found to have [place] III ulcer in stomach after endoscopy x 2. On oral PPI. Has also developed diarrhea during hospitalization. No other complaints on arrival. No further bleeding since 3/15.     Hospital/ICU Course:  No notes on file     Past Medical History:   Diagnosis Date    Abnormal finding on Pap smear, HPV DNA positive 2014    Anemia associated with chronic renal failure     on Epogen    Blood type B+     Bulging discs - symptomatic      CAD (coronary artery disease)     Cardiomyopathy 3/13/2019    Diabetes mellitus, type 2     ESRD (end stage renal disease) 04/20/2004    FSGS (focal segmental glomerulosclerosis)     with collapsing    Hyperlipidemia     Hypertension 2004     Neuropathy     Obesity     Secondary hyperparathyroidism, renal     TIA (transient ischemic attack)     Uterine fibroid     small uterine        Past Surgical History:   Procedure Laterality Date    CARDIAC CATHETERIZATION      PCI x 2     SECTION, CLASSIC      COLONOSCOPY N/A 2018    Performed by Rodrigue Russell MD at [facility] ENDO (2ND FLR)    DEBRIDEMENT-WOUND Left 2016    Performed by Teressa Maddox MD at [facility] [place]    DIALYSIS FISTULA CREATION      multiple fistulas and grafts before PD     EGD (ESOPHAGOGASTRODUODENOSCOPY) N/A 3/14/2019    Performed by Adolph [facility]ila MD at [facility] ENDO    EGD (ESOPHAGOGASTRODUODENOSCOPY) N/A 3/13/2019    Performed by Adolph Davila MD at [facility] ENDO    INCISION AND DRAINAGE (I&D), LABIAL N/A 2016    Performed by Harmony Fernandez MD at [facility] [place]    INCISION AND DRAINAGE (I&D), LABIAL (ADD ON) Left 2016    Performed by Teressa Maddox MD at [facility] [place]    INCISION AND DRAINAGE OF WOUND Left     VULVAR ABCESS WITH NECROSIS    INSERTION, CATHETER, TUNNELED ABORTED Left 3/14/2019    Performed by Servando Solis MD at [facility] OR    ORIF, HIP Left 2018    Performed by Tevin Grullon MD at [facility] [place]    PERITONEAL CATHETER INSERTION      PERMCATH REWIRE- TUNNELED CATH REWIRE Left 2017    Performed by Baldev Munroe MD at [facility] [place] CATH LAB    PERMCATH REWIRE- TUNNELED CATH REWIRE N/A 10/5/2017    Performed by Baldev Munroe MD at [facility] [place] CATH LAB    REMOVAL, CATHETER, DIALYSIS, PERITONEAL N/A 3/14/2019    Performed by Servando Solis MD at [facility] OR    UMBILICAL HERNIA REPAIR         Review of patient's allergies indicates:   Allergen Reactions    Clindamycin Diarrhea    Flagyl [metronidazole hcl]     Metronidazole        Family History     Problem Relation (Age of Onset)    Cancer Maternal Grandmother, Paternal Grandfather    Diabetes Maternal Aunt, Paternal Aunt        Tobacco Use    Smoking status: Never Smoker     Smokeless tobacco: Never Used   Substance and Sexual Activity    Alcohol use: No     Comment: Pt reports some social use of about 1-2 drinks about every six months.    Drug use: No    Sexual activity: Not Currently     Partners: Male     Birth control/protection: None      Review of Systems   Constitutional: Negative for chills and fever.   HENT: Negative for rhinorrhea, sore throat and trouble swallowing.    Eyes: Negative for photophobia and visual disturbance.   Respiratory: Negative for cough and shortness of breath.    Cardiovascular: Negative for chest pain, palpitations and leg swelling.   Gastrointestinal: Positive for abdominal distention and diarrhea. Negative for abdominal pain, nausea and vomiting.   Genitourinary: Negative for dysuria and hematuria.   Musculoskeletal: Negative for arthralgias and myalgias.   Skin: Negative for rash and wound.   Neurological: Negative for dizziness, syncope and headaches.   Psychiatric/Behavioral: Negative for agitation and confusion.     Objective:     Vital Signs (Most Recent):  Temp: 98.1 °F (36.7 °C) (03/18/19 0130)  Pulse: 93 (03/18/19 0130)  Resp: (!) 22 (03/18/19 0130)  BP: (!) 144/66 (03/18/19 0130)  SpO2: 100 % (03/18/19 0130) Vital Signs (24h Range):  Temp:  [97.9 °F (36.6 °C)-98.3 °F (36.8 °C)] 98.1 °F (36.7 °C)  Pulse:  [] 93  Resp:  [7-29] 22  SpO2:  [100 %] 100 %  BP: (104-165)/(53-77) 144/66   Weight: 93.1 kg (205 lb 4 oz)  Body mass index is 32.15 kg/m².    No intake or output data in the 24 hours ending 03/18/19 0247    Physical Exam   Constitutional: She is oriented to person, place, and time. She appears well-developed and well-nourished. No distress.   HENT:   Mouth/Throat: Oropharynx is clear and moist. No oropharyngeal exudate.   Eyes: Conjunctivae are normal. Pupils are equal, round, and reactive to light. No scleral icterus.   Neck: No JVD present. No thyromegaly present.   Cardiovascular: Normal rate and regular rhythm.   Murmur  heard.  Pulmonary/Chest: Effort normal and breath sounds normal. No respiratory distress. She has no wheezes. She has no rales.   Abdominal: Soft. She exhibits distension. There is no tenderness. There is no rebound and no guarding.   Musculoskeletal: She exhibits no edema or deformity.   Lymphadenopathy:     She has no cervical adenopathy.   Neurological: She is alert and oriented to person, place, and time. No cranial nerve deficit.   Skin: Skin is warm and dry. No rash noted. She is not diaphoretic.   Psychiatric: She has a normal mood and affect. Her behavior is normal. Judgment and thought content normal.   Nursing note and vitals reviewed.      Vents:     Lines/Drains/Airways     Central Venous Catheter Line                 Trialysis (Dialysis) Catheter 03/16/19 1742 left femoral 1 day          Drain                 Hemodialysis AV Fistula Left upper arm -- days              Significant Labs:    CBC/Anemia Profile:  Recent Labs   Lab 03/16/19  0424 03/17/19  0521 03/18/19  0224   WBC 9.58 9.41 8.97   HGB 9.7* 9.9* 10.4*   HCT 29.8* 31.1* 32.3*    237 274   MCV 92 94 96   RDW 18.4* 18.9* 18.9*        Chemistries:  Recent Labs   Lab 03/16/19  0424 03/16/19  0843 03/17/19  0521 03/17/19  1715   *  133*  --  134* 133*   K 4.3  4.3  --  5.0 4.1     100  --  104 99   CO2 21*  21*  --  19* 22*   BUN 43*  43*  --  44* 31*   CREATININE 7.5*  7.5*  --  8.2* 6.6*   CALCIUM 7.4*  7.4*  --  7.7* 7.6*   ALBUMIN 1.7*  1.7*  --  1.7*  --    PROT 5.5*  --  6.1  --    BILITOT 0.3  --  0.2  --    ALKPHOS 86  --  80  --    ALT <5*  --  5*  --    AST 18  --  20  --    MG 2.0 2.2 2.5  --    PHOS 4.6*  4.6*  --  4.5  --          Assessment/Plan:     Cardiac/Vascular   Chronic systolic heart failure    EF 30% on TTE this admission.   Starting on GDMT wtith Coreg 6.25. Will uptitrate and add additional classes as blood pressure tolerates     CAD (coronary artery disease)    On long term DAPT s/p stenting in  2012.    Now with GI bleed  Will continue Asa, hold Plavix     Hypertension    Previously on amlodipine. Will transition to medications with cardiac benefit.      Renal/   * ESRD (end stage renal disease) on PD ( initiated dialysis 04/20/2004)    Status post removal of PD catheter for concern regarding mutliple organsims growing in peritoneal fluid.   Attempts at tunneled catheter in IJ x 2 during this admission. Left femoral trialysis placed.   Vascular Surgery consulted for recommendations regarding access.   Nephrology consulted for continuing dialysis.     Last got dialysis 3/17 through femoral catheter.      ID   Peritonitis associated with peritoneal dialysis    Multiple organisms grown since 2/21.   2/21 Rothia sp  3/1 enterobacter and MSSA    Several antibiotic regimens during this hospitalization. Was on Vancomycin and PD Ceftazidine from 2/21 to 3/1. PD oxacillin and IV bactrim from 3/5 to 3/15. Cefepime renal dosing was started on 3/15 after removal of PD catheter.     Followed by ID at [facility]. Recommendation was for Cefepime until 3/28.      Endocrine   Diabetes mellitus, type 2    A1c 6.3, was getting intra-peritoneal insulin. Catheter now removed.   LDSSI with Q4hr accuchecks until needs established.      GI   Antibiotic-associated diarrhea    Patient developed diarrhea since starting antibiotics in hospital.   Also has chronic diarrhea.   C.diff positive 3 years ago. Tested during this admission, negative.   Retesting  As patient is at risk for infection.   If negative, will start anti-diarrheals     Acute gastric ulcer with hemorrhage    Dark vomitus and dark blood/melena per rectum on 3/13.   S/p EGD x 2 which revealed a clean based ulcer in stomach.   Continuing PPI, waiting for H.pylori testing results         Critical Care Daily Checklist:    A: Awake: RASS Goal/Actual Goal: RASS Goal: 0-->alert and calm  Actual: Barney Agitation Sedation Scale (RASS): Alert and calm   B: Spontaneous  Breathing Trial Performed?     C: SAT & SBT Coordinated?  N/A                      D: Delirium: CAM-ICU Overall CAM-ICU: Negative   E: Early Mobility Performed? Yes   F: Feeding Goal:    Status:     Current Diet Order   Procedures    Diet NPO      AS: Analgesia/Sedation Tylenol PRN   T: Thromboembolic Prophylaxis SCDs   H: HOB > 300 Yes   U: Stress Ulcer Prophylaxis (if needed) PPI   G: Glucose Control LDSSI   B: Bowel Function     I: Indwelling Catheter (Lines & Wiggins) Necessity Left femoral trialysis   D: De-escalation of Antimicrobials/Pharmacotherapies Cefepime    Plan for the day/ETD Vascular Surgery evaluation    Code Status:  Family/Goals of Care: Full Code         Critical secondary to Patient has a condition that poses threat to life and bodily function: Peritonitis     Critical care was time spent personally by me on the following activities: development of treatment plan with patient or surrogate and bedside caregivers, discussions with consultants, evaluation of patient's response to treatment, examination of patient, ordering and performing treatments and interventions, ordering and review of laboratory studies, ordering and review of radiographic studies, pulse oximetry, re-evaluation of patient's condition. This critical care time did not overlap with that of any other provider or involve time for any procedures.     Jorge A Gomes MD  Critical Care Medicine  [facility]

## 2019-03-18 NOTE — OP NOTE
Procedure Date: 3/18/19    (s) and Role:   Kimo Weeks MD    Indication: HD access     Pre-op Diagnosis:    ESRD    Post-op Diagnosis;  ESRD    Procedure:   1. Unsuccessful insertion Tunneled HD Catheter with Fluoro- unable to thread wire 2/2 occlusion bilaterally   2. Central Venogram   3. Conscious Sedation    Anesthesia: IV Sedation + Local     Findings/Key Components:   Catheter placed in RA. Good flush and draw through each port.  Estimated Blood Loss: 5mL     Specimens     None         PROCEDURE: After obtaining consent, the patient was taken to the ALONZO suite. Sedation was administered. The right neck and chest were prepped and draped in usual sterile fashion. We began using ultrasound guidance to examine the right internal jugular vein. It appeared to be very small and was free of thrombus. We accessed the internal jugular vein using a Seldinger technique with an micropunture needle with some difficulty, then the thin wire was unable to thread down the IJ 2/2 occlusion. Central venogram showed complete occlusion of the IJ. Then we turned our attention to the left IJ. The left neck and chest were prepped and draped in usual sterile fashion. We began using ultrasound guidance to examine the left internal jugular vein. It appeared to be very small and had a thrombus. Unable to cannulate and procedure was terminated. There were no immediate complications. Patient tolerated the procedure well and discharged back to her room in stable condition.

## 2019-03-18 NOTE — PROGRESS NOTES
Called report to [facility] icu. Pt going to room 6065. Transfer center notified me that ambulance should arrive within the hour. Family notified of transfer.

## 2019-03-18 NOTE — HPI
Jenni Todd is a 48 y.o. F with hx of CAD s/p 2 stents, HTN, DM, and ESRD on home PD.  Was admitted to [facility] on 2/21 for peritonitis where her PD catheter was removed.  She was then transitioned to HD.  Bilateral IJ tunneled catheters were attempted without success.  She now has a temporary dialysis catheter in her left groin.  She was transferred to [facility] for management.  ALONZO attempted a tunneled line today and were unsuccessful.  Vascular surgery was consulted for HD access.      She is on plavix at home but it has been held since admission to [place] due to suspicion for GI bleed.  She has had multiple tunneled IJ catheters bilaterally as well as a left arm AVF and AVG.  She has not had anything on the right.

## 2019-03-18 NOTE — SUBJECTIVE & OBJECTIVE
Past Medical History:   Diagnosis Date    Abnormal finding on Pap smear, HPV DNA positive     Anemia associated with chronic renal failure     on Epogen    Blood type B+     Bulging discs - symptomatic      CAD (coronary artery disease)     Cardiomyopathy 3/13/2019    Diabetes mellitus, type 2     ESRD (end stage renal disease) 2004    FSGS (focal segmental glomerulosclerosis)     with collapsing    Hyperlipidemia     Hypertension     Neuropathy     Obesity     Secondary hyperparathyroidism, renal     TIA (transient ischemic attack)     Uterine fibroid     small uterine        Past Surgical History:   Procedure Laterality Date    CARDIAC CATHETERIZATION      PCI x 2     SECTION, CLASSIC      COLONOSCOPY N/A 2018    Performed by Rodrigue Russell MD at [facility] ENDO (2ND FLR)    DEBRIDEMENT-WOUND Left 2016    Performed by Teressa Maddox MD at [facility] OR    DIALYSIS FISTULA CREATION      multiple fistulas and grafts before PD     EGD (ESOPHAGOGASTRODUODENOSCOPY) N/A 3/14/2019    Performed by Adolph [facility]ila MD at [facility] ENDO    EGD (ESOPHAGOGASTRODUODENOSCOPY) N/A 3/13/2019    Performed by Adolph [facility]ila MD at [facility] ENDO    INCISION AND DRAINAGE (I&D), LABIAL N/A 2016    Performed by Harmony Fernandez MD at [facility] OR    INCISION AND DRAINAGE (I&D), LABIAL (ADD ON) Left 2016    Performed by Teressa Maddox MD at [facility] OR    INCISION AND DRAINAGE OF WOUND Left     VULVAR ABCESS WITH NECROSIS    INSERTION, CATHETER, TUNNELED ABORTED Left 3/14/2019    Performed by Servando Solis MD at [facility] OR    ORIF, HIP Left 2018    Performed by Tevin Grullon MD at [facility] OR    PERITONEAL CATHETER INSERTION      PERMCATH REWIRE- TUNNELED CATH REWIRE Left 2017    Performed by Baldev Munroe MD at [facility] CATH LAB    PERMCATH REWIRE- TUNNELED CATH REWIRE N/A 10/5/2017    Performed by Baldev Munroe MD at [facility] CATH LAB    REMOVAL, CATHETER, DIALYSIS,  PERITONEAL N/A 3/14/2019    Per[facility] by [facility] at [facility] OR    UMBILICAL HERNIA REPAIR         Review of patient's allergies indicates:   Allergen Reactions    Clindamycin Diarrhea    Flagyl [metronidazole hcl]     Metronidazole      Current Facility-Administered Medications   Medication Frequency    0.9%  NaCl infusion Once    acetaminophen tablet 650 mg Q8H PRN    aspirin EC tablet 81 mg Daily    carvedilol tablet 12.5 mg BID    ceFEPIme injection 1 g Daily    dextrose 50% injection 12.5 g PRN    dextrose 50% injection 25 g PRN    glucagon (human recombinant) injection 1 mg PRN    glucose chewable tablet 16 g PRN    glucose chewable tablet 24 g PRN    heparin infusion 1,000 units/500 ml in 0.9% NaCl (pressure line flush) Continuous PRN    insulin aspart U-100 pen 0-5 Units QID (AC + HS) PRN    ondansetron injection 4 mg Q8H PRN    pantoprazole EC tablet 40 mg Daily    sevelamer carbonate tablet 800 mg TID WM    sodium bicarbonate tablet 650 mg BID    sodium chloride 0.9% flush 5 mL PRN     Family History     Problem Relation (Age of Onset)    Cancer Maternal Grandmother, Paternal Grandfather    Diabetes Maternal Aunt, Paternal Aunt        Tobacco Use    Smoking status: Never Smoker    Smokeless tobacco: Never Used   Substance and Sexual Activity    Alcohol use: No     Comment: Pt reports some social use of about 1-2 drinks about every six months.    Drug use: No    Sexual activity: Not Currently     Partners: Male     Birth control/protection: None     Review of Systems   Constitutional: Negative for chills and fever.   HENT: Negative for congestion.    Respiratory: Negative for chest tightness and shortness of breath.    Cardiovascular: Negative for chest pain and palpitations.   Gastrointestinal: Positive for abdominal pain and diarrhea. Negative for constipation, nausea and vomiting.   Musculoskeletal: Negative for arthralgias.   Neurological: Negative for seizures,  syncope and numbness.   Psychiatric/Behavioral: Negative for agitation and confusion.     Objective:     Vital Signs (Most Recent):  Temp: 97.4 °F (36.3 °C) (03/18/19 0700)  Pulse: 86 (03/18/19 1400)  Resp: (!) 22 (03/18/19 1400)  BP: (!) 108/59 (03/18/19 1400)  SpO2: 100 % (03/18/19 1400)  O2 Device (Oxygen Therapy): room air (03/18/19 1015) Vital Signs (24h Range):  Temp:  [97.4 °F (36.3 °C)-98.3 °F (36.8 °C)] 97.4 °F (36.3 °C)  Pulse:  [] 86  Resp:  [12-28] 22  SpO2:  [96 %-100 %] 100 %  BP: (108-161)/() 108/59     Weight: 93.1 kg (205 lb 4 oz) (03/18/19 0130)  Body mass index is 32.15 kg/m².  Body surface area is 2.1 meters squared.    No intake/output data recorded.    Physical Exam   Constitutional: She is oriented to person, place, and time. She appears well-developed and well-nourished. No distress.   HENT:   Head: Normocephalic and atraumatic.   Right Ear: External ear normal.   Left Ear: External ear normal.   Eyes: Conjunctivae and EOM are normal. Right eye exhibits no discharge. Left eye exhibits no discharge.   Neck: Normal range of motion. Neck supple.   Cardiovascular: Normal rate and regular rhythm. Exam reveals no gallop and no friction rub.   No murmur heard.  Pulmonary/Chest: Effort normal and breath sounds normal. No respiratory distress. She has no wheezes. She has no rales.   Abdominal: Soft. She exhibits distension. There is no tenderness.   Musculoskeletal: Normal range of motion. She exhibits no edema or deformity.   Neurological: She is alert and oriented to person, place, and time.   Skin: Skin is warm and dry. She is not diaphoretic.   Psychiatric: She has a normal mood and affect. Her behavior is normal.       Significant Labs:  CBC:   Recent Labs   Lab 03/18/19  0224   WBC 8.97   RBC 3.37*   HGB 10.4*   HCT 32.3*      MCV 96   MCH 30.9   MCHC 32.2     CMP:   Recent Labs   Lab 03/18/19 0224   GLU 85   CALCIUM 7.9*   ALBUMIN 1.7*   PROT 6.1   *   K 4.4   CO2 19*       BUN 33*   CREATININE 7.3*   ALKPHOS 88   ALT <5*   AST 14   BILITOT 0.2

## 2019-03-18 NOTE — SIGNIFICANT EVENT
Our team this morning and afternoon spoke with the patient and family. The patient said in her own words that she wants to be transferred to the main campus. The family (4 family members) at bedside also said they wanted her to be transferred to the main campus to get vascula surgeons to get the help they can get there. We asked the patient and family multiple times, and they kept saying they are requesting to be transferred to the main [place]. As the primary team, we always want to do the best for the patient's health and fulfill their request in any ways we can. Case discussed with Dr. Del Real and Dr. Lombardi who facilitiated the transfer.

## 2019-03-18 NOTE — HPI
48 F with ESRD previously on PD since 2005, CAD s/p 2 stents in 2012 on DAPT, HTN, DM on home PD insulin presented to [facility] on 2/21 with abdominal pain after being referred by dialysis nurse for concern about peritonitis. She has had multiple episodes of peritonitis in the past, last in 2017. Grew out multiple organisms including Rothia sp, MSSA and enterobacter. She was treated with PD antibiotics and switched to cefepime on 3/15. Decision was made to remove PD catheter due to recurrent infection, performed on 3/14. Attempts at tunneled catheter in bilateral IJs unsuccessful due to inability to advance guidewire. Femoral trialysis placed and has been used multiple times although flow has been sluggish. Last use yesterday. Patient was transferred for vascular surgery evaluation of access for HD.     Hospital Course complicated by upper GI bleed. Found to have Parker III ulcer in stomach after endoscopy x 2. On oral PPI. Has also developed diarrhea during hospitalization. No other complaints on arrival. No further bleeding since 3/15.

## 2019-03-18 NOTE — SUBJECTIVE & OBJECTIVE
Past Medical History:   Diagnosis Date    Abnormal finding on Pap smear, HPV DNA positive     Anemia associated with chronic renal failure     on Epogen    Blood type B+     Bulging discs - symptomatic      CAD (coronary artery disease)     Cardiomyopathy 3/13/2019    Diabetes mellitus, type 2     ESRD (end stage renal disease) 2004    FSGS (focal segmental glomerulosclerosis)     with collapsing    Hyperlipidemia     Hypertension     Neuropathy     Obesity     Secondary hyperparathyroidism, renal     TIA (transient ischemic attack)     Uterine fibroid     small uterine        Past Surgical History:   Procedure Laterality Date    CARDIAC CATHETERIZATION      PCI x 2     SECTION, CLASSIC      COLONOSCOPY N/A 2018    Performed by Rodrigue Russell MD at [facility] ENDO (2ND FLR)    DEBRIDEMENT-WOUND Left 2016    Performed by [facility]a [facility]ox MD at [facility] OR    DIALYSIS FISTULA CREATION      multiple fistulas and grafts before PD     EGD (ESOPHAGOGASTRODUODENOSCOPY) N/A 3/14/2019    Performed by [facility] at [facility] ENDO    EGD (ESOPHAGOGASTRODUODENOSCOPY) N/A 3/13/2019    Performed by Adolph [facility]ila MD at [facility] ENDO    INCISION AND DRAINAGE (I&D), LABIAL N/A 2016    Performed by Harmony Fernandez MD at [facility] OR    INCISION AND DRAINAGE (I&D), LABIAL (ADD ON) Left 2016    Performed by Teressa Maddox MD at [facility] OR    INCISION AND DRAINAGE OF WOUND Left     VULVAR ABCESS WITH NECROSIS    INSERTION, CATHETER, TUNNELED ABORTED Left 3/14/2019    [facility] by [facility] at [facility] OR    ORIF, HIP Left 2018    Performed by Tevin Grullon MD at [facility] OR    PERITONEAL CATHETER INSERTION      PERMCATH REWIRE- TUNNELED CATH REWIRE Left 2017    Performed by Baldev Munroe MD at [facility] CATH LAB    PERMCATH REWIRE- TUNNELED CATH REWIRE N/A 10/5/2017    Performed by Baldev Munroe MD at [facility] CATH LAB    REMOVAL, CATHETER, DIALYSIS,  PERITONEAL N/A 3/14/2019    Performed by [facility] MD at [facility] OR    UMBILICAL HERNIA REPAIR         Review of patient's allergies indicates:   Allergen Reactions    Clindamycin Diarrhea    Flagyl [metronidazole hcl]     Metronidazole        Family History     Problem Relation (Age of Onset)    Cancer Maternal Grandmother, Paternal Grandfather    Diabetes Maternal Aunt, Paternal Aunt        Tobacco Use    Smoking status: Never Smoker    Smokeless tobacco: Never Used   Substance and Sexual Activity    Alcohol use: No     Comment: Pt reports some social use of about 1-2 drinks about every six months.    Drug use: No    Sexual activity: Not Currently     Partners: Male     Birth control/protection: None      Review of Systems   Constitutional: Negative for chills and fever.   HENT: Negative for rhinorrhea, sore throat and trouble swallowing.    Eyes: Negative for photophobia and visual disturbance.   Respiratory: Negative for cough and shortness of breath.    Cardiovascular: Negative for chest pain, palpitations and leg swelling.   Gastrointestinal: Positive for abdominal distention and diarrhea. Negative for abdominal pain, nausea and vomiting.   Genitourinary: Negative for dysuria and hematuria.   Musculoskeletal: Negative for arthralgias and myalgias.   Skin: Negative for rash and wound.   Neurological: Negative for dizziness, syncope and headaches.   Psychiatric/Behavioral: Negative for agitation and confusion.     Objective:     Vital Signs (Most Recent):  Temp: 98.1 °F (36.7 °C) (03/18/19 0130)  Pulse: 93 (03/18/19 0130)  Resp: (!) 22 (03/18/19 0130)  BP: (!) 144/66 (03/18/19 0130)  SpO2: 100 % (03/18/19 0130) Vital Signs (24h Range):  Temp:  [97.9 °F (36.6 °C)-98.3 °F (36.8 °C)] 98.1 °F (36.7 °C)  Pulse:  [] 93  Resp:  [7-29] 22  SpO2:  [100 %] 100 %  BP: (104-165)/(53-77) 144/66   Weight: 93.1 kg (205 lb 4 oz)  Body mass index is 32.15 kg/m².    No intake or output data in the 24 hours  ending 03/18/19 0247    Physical Exam   Constitutional: She is oriented to person, place, and time. She appears well-developed and well-nourished. No distress.   HENT:   Mouth/Throat: Oropharynx is clear and moist. No oropharyngeal exudate.   Eyes: Conjunctivae are normal. Pupils are equal, round, and reactive to light. No scleral icterus.   Neck: No JVD present. No thyromegaly present.   Cardiovascular: Normal rate and regular rhythm.   Murmur heard.  Pulmonary/Chest: Effort normal and breath sounds normal. No respiratory distress. She has no wheezes. She has no rales.   Abdominal: Soft. She exhibits distension. There is no tenderness. There is no rebound and no guarding.   Musculoskeletal: She exhibits no edema or deformity.   Lymphadenopathy:     She has no cervical adenopathy.   Neurological: She is alert and oriented to person, place, and time. No cranial nerve deficit.   Skin: Skin is warm and dry. No rash noted. She is not diaphoretic.   Psychiatric: She has a normal mood and affect. Her behavior is normal. Judgment and thought content normal.   Nursing note and vitals reviewed.      Vents:     Lines/Drains/Airways     Central Venous Catheter Line                 Trialysis (Dialysis) Catheter 03/16/19 1742 left femoral 1 day          Drain                 Hemodialysis AV Fistula Left upper arm -- days              Significant Labs:    CBC/Anemia Profile:  Recent Labs   Lab 03/16/19  0424 03/17/19  0521 03/18/19  0224   WBC 9.58 9.41 8.97   HGB 9.7* 9.9* 10.4*   HCT 29.8* 31.1* 32.3*    237 274   MCV 92 94 96   RDW 18.4* 18.9* 18.9*        Chemistries:  Recent Labs   Lab 03/16/19  0424 03/16/19  0843 03/17/19  0521 03/17/19  1715   *  133*  --  134* 133*   K 4.3  4.3  --  5.0 4.1     100  --  104 99   CO2 21*  21*  --  19* 22*   BUN 43*  43*  --  44* 31*   CREATININE 7.5*  7.5*  --  8.2* 6.6*   CALCIUM 7.4*  7.4*  --  7.7* 7.6*   ALBUMIN 1.7*  1.7*  --  1.7*  --    PROT 5.5*  --  6.1   --    BILITOT 0.3  --  0.2  --    ALKPHOS 86  --  80  --    ALT <5*  --  5*  --    AST 18  --  20  --    MG 2.0 2.2 2.5  --    PHOS 4.6*  4.6*  --  4.5  --

## 2019-03-18 NOTE — CONSULTS
[facility]  Nephrology  Consult Note    Patient Name: Jenni Todd  MRN: [id number]  Admission Date: 3/18/2019  Hospital Length of Stay: 0 days  Attending Provider: Mandeep Angeles*   Primary Care Physician: Michael Tan Iii, MD  Principal Problem:Hemodialysis catheter dysfunction    Inpatient consult to Nephrology  Consult performed by: Andrew Ponce NP  Consult ordered by: Jorge A Gomes MD  Reason for consult: ESRD        Subjective:     HPI: Ms. Todd is an ESRD patient on CCPD, recently transitioned to HD at [facility] for recurrent peritonitis who presents as a transfer from [facility] for vascular surgery evaluation for vascular access.  According to records, she presented to [facility] on the advice of her OP Nephrologist for treatment of peritonitis.  Cultures grew out Rothia sp, enterobacter, and MSSA.  She continued to CCPD while there, but noted to have worsening white count on peritoneal fluids and abdominal pain, and decision was made to remove the peritoneal catheter and transition to hemodialysis. According to patient, her PD catheter was removed on 3/14, and she underwent temporary dialysis catheter placement at that time.  Surgery attempted to place temporary line in the IJ's, but according to records, they were unsuccessfully at advancing the guidewire and she had to have a femoral catheter placed for CVVHD.  Records indicate that she had had multiple catheter exchanges due to poor flows, despite using longer catheters, and decision was made to consult vascular surgery for accuseal graft for vascular access.  She was transferred to [facility] on 3/18 per her request and Vascular surgery, nephrology, and ALONZO was consulted for evaluation.  ALONZO attempted placement of permacath, but was unsuccessful.  Vascular surgery planning for Accuseal graft placement on Wednesday (3/20).  Nephrology has been consulted for ESRD management while in-patient.    She CVVHD yesterday, and  according to family, had to be discontinued 2/2 clotting and multiple pressure alarms prior to transfer to [facility].  Regarding her peritonitis, she was followed by ID at OSH who recommend to continue her Cefepime 1 g QD until 3/28, which has been continued here at [facility].  ICU has planned to step patient down to hospital medicine.    Past Medical History:   Diagnosis Date    Abnormal finding on Pap smear, HPV DNA positive     Anemia associated with chronic renal failure     on Epogen    Blood type B+     Bulging discs - symptomatic      CAD (coronary artery disease)     Cardiomyopathy 3/13/2019    Diabetes mellitus, type 2     ESRD (end stage renal disease) 2004    FSGS (focal segmental glomerulosclerosis)     with collapsing    Hyperlipidemia     Hypertension     Neuropathy     Obesity     Secondary hyperparathyroidism, renal     TIA (transient ischemic attack)     Uterine fibroid     small uterine        Past Surgical History:   Procedure Laterality Date    CARDIAC CATHETERIZATION      PCI x 2     SECTION, CLASSIC      COLONOSCOPY N/A 2018    Performed by Rodrigue Russell MD at [facility] ENDO (2ND FLR)    DEBRIDEMENT-WOUND Left 2016    Performed by [facility] Maddox MD at [facility] OR    DIALYSIS FISTULA CREATION      multiple fistulas and grafts before PD     EGD (ESOPHAGOGASTRODUODENOSCOPY) N/A 3/14/2019    Performed by [facility]olph [facility] MD at [facility] ENDO    EGD (ESOPHAGOGASTRODUODENOSCOPY) N/A 3/13/2019    Performed by Adolph [facility]ila MD at [facility] ENDO    INCISION AND DRAINAGE (I&D), LABIAL N/A 2016    Performed by Harmony Fernandez MD at [facility] OR    INCISION AND DRAINAGE (I&D), LABIAL (ADD ON) Left 2016    Performed by [facility]a Maddox MD at [facility] OR    INCISION AND DRAINAGE OF WOUND Left     VULVAR ABCESS WITH NECROSIS    INSERTION, CATHETER, TUNNELED ABORTED Left 3/14/2019    Performed by Servando [facility]is MD at [facility] OR    ORIF, HIP Left 2018     Performed by Tevin [facility]ullon MD at [facility] OR    PERITONEAL CATHETER INSERTION      PERMCATH REWIRE- TUNNELED CATH REWIRE Left 11/13/2017    Performed by Baldev Munroe MD at [facility] CATH LAB    PERMCATH REWIRE- TUNNELED CATH REWIRE N/A 10/5/2017    Performed by Baldev [facility]roe MD at [facility] CATH LAB    REMOVAL, CATHETER, DIALYSIS, PERITONEAL N/A 3/14/2019    Performed by [facility] MD at [facility] OR    UMBILICAL HERNIA REPAIR         Review of patient's allergies indicates:   Allergen Reactions    Clindamycin Diarrhea    Flagyl [metronidazole hcl]     Metronidazole      Current Facility-Administered Medications   Medication Frequency    0.9%  NaCl infusion Once    acetaminophen tablet 650 mg Q8H PRN    aspirin EC tablet 81 mg Daily    carvedilol tablet 12.5 mg BID    ceFEPIme injection 1 g Daily    dextrose 50% injection 12.5 g PRN    dextrose 50% injection 25 g PRN    glucagon (human recombinant) injection 1 mg PRN    glucose chewable tablet 16 g PRN    glucose chewable tablet 24 g PRN    heparin infusion 1,000 units/500 ml in 0.9% NaCl (pressure line flush) Continuous PRN    insulin aspart U-100 pen 0-5 Units QID (AC + HS) PRN    ondansetron injection 4 mg Q8H PRN    pantoprazole EC tablet 40 mg Daily    sevelamer carbonate tablet 800 mg TID WM    sodium bicarbonate tablet 650 mg BID    sodium chloride 0.9% flush 5 mL PRN     Family History     Problem Relation (Age of Onset)    Cancer Maternal Grandmother, Paternal Grandfather    Diabetes Maternal Aunt, Paternal Aunt        Tobacco Use    Smoking status: Never Smoker    Smokeless tobacco: Never Used   Substance and Sexual Activity    Alcohol use: No     Comment: Pt reports some social use of about 1-2 drinks about every six months.    Drug use: No    Sexual activity: Not Currently     Partners: Male     Birth control/protection: None     Review of Systems   Constitutional: Negative for chills and fever.   HENT: Negative for  congestion.    Respiratory: Negative for chest tightness and shortness of breath.    Cardiovascular: Negative for chest pain and palpitations.   Gastrointestinal: Positive for abdominal pain and diarrhea. Negative for constipation, nausea and vomiting.   Musculoskeletal: Negative for arthralgias.   Neurological: Negative for seizures, syncope and numbness.   Psychiatric/Behavioral: Negative for agitation and confusion.     Objective:     Vital Signs (Most Recent):  Temp: 97.4 °F (36.3 °C) (03/18/19 0700)  Pulse: 86 (03/18/19 1400)  Resp: (!) 22 (03/18/19 1400)  BP: (!) 108/59 (03/18/19 1400)  SpO2: 100 % (03/18/19 1400)  O2 Device (Oxygen Therapy): room air (03/18/19 1015) Vital Signs (24h Range):  Temp:  [97.4 °F (36.3 °C)-98.3 °F (36.8 °C)] 97.4 °F (36.3 °C)  Pulse:  [] 86  Resp:  [12-28] 22  SpO2:  [96 %-100 %] 100 %  BP: (108-161)/() 108/59     Weight: 93.1 kg (205 lb 4 oz) (03/18/19 0130)  Body mass index is 32.15 kg/m².  Body surface area is 2.1 meters squared.    No intake/output data recorded.    Physical Exam   Constitutional: She is oriented to person, place, and time. She appears well-developed and well-nourished. No distress.   HENT:   Head: Normocephalic and atraumatic.   Right Ear: External ear normal.   Left Ear: External ear normal.   Eyes: Conjunctivae and EOM are normal. Right eye exhibits no discharge. Left eye exhibits no discharge.   Neck: Normal range of motion. Neck supple.   Cardiovascular: Normal rate and regular rhythm. Exam reveals no gallop and no friction rub.   No murmur heard.  Pulmonary/Chest: Effort normal and breath sounds normal. No respiratory distress. She has no wheezes. She has no rales.   Abdominal: Soft. She exhibits distension. There is no tenderness.   Musculoskeletal: Normal range of motion. She exhibits no edema or deformity.   Neurological: She is alert and oriented to person, place, and time.   Skin: Skin is warm and dry. She is not diaphoretic.    Psychiatric: She has a normal mood and affect. Her behavior is normal.       Significant Labs:  CBC:   Recent Labs   Lab 03/18/19 0224   WBC 8.97   RBC 3.37*   HGB 10.4*   HCT 32.3*      MCV 96   MCH 30.9   MCHC 32.2     CMP:   Recent Labs   Lab 03/18/19 0224   GLU 85   CALCIUM 7.9*   ALBUMIN 1.7*   PROT 6.1   *   K 4.4   CO2 19*      BUN 33*   CREATININE 7.3*   ALKPHOS 88   ALT <5*   AST 14   BILITOT 0.2     Assessment/Plan:     ESRD (end stage renal disease) on PD ( initiated dialysis 04/20/2004)    ESRD on CCPD admitted to OSH for treatment of peritonitis.  Hospital course complicated by worsening WBC in peritoneal fluid despite appropriate Abx coverage requiring removal of her peritoneal dialysis catheter. Vascular access attempted to multiple occasions at the OSH, but continues to have poor vasculature for long term dialysis access and she was transferred to [facility] for vascular surgery evaluation for possible accuseal graft.  Nephrology consulted for ESRD management while in-patient.    Plan/Recommendations:  -HD today for metabolic clearance  -treatment time of 4 hours.  Appears to have had 200 BFR with her CVVHD, so will do 200 BFR with 800 DFR and F200 dialyzer for improved clearance  -will evaluate  Labs in AM, may need more treatment time tomorrow dependent on clearance with therapy tonight.  -recommend continuing abx as outlined by ID at OSH.       Andrew Ragsdale NP  Nephrology  [facility]

## 2019-03-18 NOTE — HPI
Ms. Todd is an ESRD patient on CCPD, recently transitioned to HD at [facility] for recurrent peritonitis who presents as a transfer from [facility] for vascular surgery evaluation for vascular access.  According to records, she presented to [facility] on the advice of her OP Nephrologist for treatment of peritonitis.  Cultures grew out Rothia sp, enterobacter, and MSSA.  She continued to CCPD while there, but noted to have worsening white count on peritoneal fluids and abdominal pain, and decision was made to remove the peritoneal catheter and transition to hemodialysis. According to patient, her PD catheter was removed on 3/14, and she underwent temporary dialysis catheter placement at that time.  Surgery attempted to place temporary line in the IJ's, but according to records, they were unsuccessfully at advancing the guidewire and she had to have a femoral catheter placed for CVVHD.  Records indicate that she had had multiple catheter exchanges due to poor flows, despite using longer catheters, and decision was made to consult vascular surgery for accuseal graft for vascular access.  She was transferred to [facility] on [facility]/18 per her request and Vascular surgery, nephrology, and ALONZO was consulted for evaluation.  ALONZO attempted placement of permacath, but was unsuccessful.  Vascular surgery planning for Accuseal graft placement on Wednesday (3/20).  Nephrology has been consulted for ESRD management while in-patient.    She CVVHD yesterday, and according to family, had to be discontinued 2/2 clotting and multiple pressure alarms prior to transfer to [facility].  Regarding her peritonitis, she was followed by ID at OSH who recommend to continue her Cefepime 1 g QD until 3/28, which has been continued here at [facility].  ICU has planned to step patient down to hospital medicine.

## 2019-03-18 NOTE — PLAN OF CARE
Problem: Adult Inpatient Plan of Care  Goal: Plan of Care Review  Outcome: Ongoing (interventions implemented as appropriate)  See flowsheets for VS and assessments. See below for updates on progress made.    Neuro: AAOx4. Flat affect, afebrile     Cardiovascular: NSR- Sinus tach. HR 80s-low 100s. BP 140s-160s/60s-90s    Pulmonary: RA. SpO2 100% No c/o SOB    GI: 1 watery BM. C. Diff Cx sent. Abdomen distended and tender. Clear liquid diet. No N/V    : Anuric    Endocrine: Accuchecks Q4hr. No supplemental insulin needed    Integumentary: Small stage 2 wound to top of L glute    Infusions: none    POC: Consult to vascular surgery for line placement for dialysis    Patient progressing toward goals as tolerated. POC communicated and reviewed with Jennihimanshu Todd and daughter All concerns addressed. WCTM.

## 2019-03-18 NOTE — PLAN OF CARE
Hospital Medicine ICU Acceptance Note    Date of Admit: 3/18/2019  Date of Transfer / Stepdown: 3/18/2019  Boeves, C/J, L, Onc (IV chemo w/in 1 month), Gyn/Onc, or other special case?: no  ICU team stepping patient down: CCU   ICU team member giving verbal handoff: Dr. Gomes [id number]  Accepting  team: NICOLÁS    Brief History of Present Illness:      48 F with ESRD, formerly on PD. PD catheter removed for recurrent peritonitis. Unable to get permanent access at [place] so transferred to [facility]. Vascular Surgery requesting Nephrology Access place perm cath.      Course complicated by GIB with low risk ulcer. On PPI.     Hospital/ICU Course:     ESRD- has fem trialysis. Working but slow. Needs permanent access     CAD- remote stents. Was on DAPT, continuing on Asa     GIB- no more bleeding, on PPI     ID- cefepime until 3/28. Renal dosing      Consultants and Procedures:     Consultants:  Nephrology  IR    Procedures:    Tunneled HD catheter placement    Transfer Information:     Diet:  NPO    Physical Activity:  PT/OT    To Do / Pending Studies / Follow ups:  Awaiting permanent access placement       Patient has been accepted by Hospital Medicine Team A, who will assume care of the patient upon arrival to the floor from the ICU. Please contact ICU team with any concerns prior to arrival. Please contact Hospital Medicine at [phone] or [phone] (please do NOT leave a voicemail) when patient arrives to the floor.    Priya Cox MD  Department of Hospital Medicine  [facility]  (pager) [phone]

## 2019-03-18 NOTE — CARE UPDATE
Reported the transfer has been accepted to the patient. Await for a bed at the main campus' ICU. She is very happy and verbalized understanding and appreciation, she shaked my hands and smiled.

## 2019-03-19 PROBLEM — T81.89XA DELAYED SURGICAL WOUND HEALING: Status: ACTIVE | Noted: 2019-01-01

## 2019-03-19 PROBLEM — L89.322 PRESSURE INJURY OF LEFT BUTTOCK, STAGE 2: Status: ACTIVE | Noted: 2019-01-01

## 2019-03-19 NOTE — PROGRESS NOTES
Hemodialysis    Bedside HD treatment in room 1153A. Patient is asleep, vitally stable.     ACCESS: left femoral catheter, indwelled Heparin aspirated, with good flow on arterial port, sluggish venous port.    HD started

## 2019-03-19 NOTE — ANESTHESIA PREPROCEDURE EVALUATION
[facility]  Anesthesia Pre-Operative Evaluation         Patient Name: Jenni Todd  YOB: 1970  MRN: [id number]    SUBJECTIVE:     Pre-operative evaluation for Procedure(s) (LRB):  INSERTION, GRAFT, ARTERIOVENOUS, RIGHT ARM (Right)     03/19/2019    Jenni Todd is a 48 y.o. female w/ a significant PMHx of CAD s/p 2 stents (2012), CHF (EF 30%) HTN, DM, and ESRD on home PD. Was admitted to [facility] on 2/21 for peritonitis where her PD catheter was removed. She was then transitioned to HD. Bilateral IJ tunneled catheters were attempted without success. She now has a temporary dialysis catheter in her left groin.  She was transferred to [facility] for management. Nephrology access team attempted a tunneled line and were unsuccessful.  Vascular surgery consulted for HD access.       Hospital course complicated by GIB with low risk ulcer, currently on PPI.     Patient now presents for the above procedure(s).      LDA:        Trialysis (Dialysis) Catheter 03/16/19 1742 left femoral (Active)   IV Device Securement sutures 3/19/2019  3:15 AM   Patency/Care flushed w/o difficulty;blood return present 3/19/2019  3:15 AM   Site Assessment Clean;Dry;Intact;No redness;No swelling 3/19/2019  3:15 AM   Status Accessed 3/19/2019  3:15 AM   Flows Good 3/19/2019  3:15 AM   Dressing Status Biopatch in place;Clean;Dry;Intact 3/19/2019  3:15 AM   Dressing Change Due 03/23/19 3/19/2019  3:15 AM   Site Condition No complications 3/19/2019  3:15 AM   Dressing Occlusive 3/18/2019 12:09 PM   Daily Line Review Performed 3/18/2019 12:09 PM   Number of days: 2            Hemodialysis AV Fistula Left upper arm (Active)   Site Assessment Clean;Intact 3/18/2019  8:30 PM   Patency Absent ;Thrill;Bruit 3/18/2019 12:09 PM   Status Deaccessed 3/18/2019 12:09 PM   Dressing Intervention Other (Comment) 4/20/2016  7:55 AM   Site Condition No complications 3/18/2019  8:30 PM   Dressing Open to air (None) 3/18/2019  12:09 PM   Number of days:        Prev airway: Easy mask ventilation with oral airway. Grade I view with Reddy, ETT placed on 1st attempt without complication.     Drips:    heparin (porcine) 1,000 Units/hr (03/18/19 1016)       Patient Active Problem List   Diagnosis    Neuropathy    ESRD (end stage renal disease) on PD ( initiated dialysis 04/20/2004)    FSGS (focal segmental glomerulosclerosis) biopsy proven with collapsing features    Anemia in chronic kidney disease, on chronic dialysis    Hypertension    Hyperlipidemia    Obesity    CAD (coronary artery disease)    Diabetes mellitus, type 2    Awaiting organ transplant status    Bulging discs - symptomatic     Spinal stenosis of sacral and sacrococcygeal region    Hypertensive renal disease    Hypertension associated with diabetes    Hypoalbuminemia    Cerebral infarction due to embolism of middle cerebral artery    Gait abnormality    Chronic low back pain    DDD (degenerative disc disease), lumbar    PAD (peripheral artery disease)    Gastrointestinal hemorrhage with hematemesis    Peritonitis associated with peritoneal dialysis    Peritonitis due to infected peritoneal dialysis catheter    Abdominal pain    Occult GI bleeding    Hypotension due to hypovolemia    Nausea and vomiting    Cardiomyopathy    Chronic systolic heart failure    Acute gastric ulcer with hemorrhage    Antibiotic-associated diarrhea    Hemodialysis catheter dysfunction    DVT (deep venous thrombosis)       Review of patient's allergies indicates:   Allergen Reactions    Clindamycin Diarrhea    Flagyl [metronidazole hcl]     Metronidazole        Current Inpatient Medications:   sodium chloride 0.9%   Intravenous Once    carvedilol  12.5 mg Oral BID    ceFEPime (MAXIPIME) IVPB  1 g Intravenous Daily    pantoprazole  40 mg Oral Daily    sevelamer carbonate  800 mg Oral TID WM    sodium bicarbonate  650 mg Oral BID       No current  facility-administered medications on file prior to encounter.      Current Outpatient Medications on File Prior to Encounter   Medication Sig Dispense Refill    amLODIPine (NORVASC) 5 MG tablet Take 1 tablet (5 mg total) by mouth once daily. 30 tablet 11    aspirin (ECOTRIN) 81 MG EC tablet Take 1 tablet (81 mg total) by mouth once daily. 30 tablet 12    atorvastatin (LIPITOR) 40 MG tablet Take 40 mg by mouth every evening.       calcitRIOL (ROCALTROL) 0.5 MCG Cap Take 1 capsule by mouth every morning.       carvedilol (COREG) 3.125 MG tablet Take 1 tablet (3.125 mg total) by mouth 2 (two) times daily. 60 tablet 11    cinacalcet (SENSIPAR) 90 MG Tab Take 1 tablet by mouth every evening.       clopidogrel (PLAVIX) 75 mg tablet Take 1 tablet (75 mg total) by mouth once daily. 30 tablet 11    epoetin adonay 10,000 unit/mL Soln 1 mL, epoetin adonay 20,000 unit/mL Soln 1 mL Inject 30,000 Units into the vein every 14 (fourteen) days.      gabapentin (NEURONTIN) 100 MG capsule 1 capsule 3 (three) times daily.      gabapentin (NEURONTIN) 300 MG capsule TAKE 3 CAPSULES (900 MG TOTAL) BY MOUTH 3 (THREE) TIMES A DAY. 90 capsule 3    heparin sodium,porcine (HEPARIN, PORCINE,) 5,000 unit/mL injection Inject 1.6 mLs (8,000 Units total) into the skin every 12 (twelve) hours. 10 mL 1    HUMULIN R REGULAR U-100 INSULN 100 unit/mL injection Inject 200 units into dialysis bag every evening.      nateglinide (STARLIX) 120 MG tablet STARLIX 120MG 30 MINUTES BEFORE EACH MEAL.      ONETOUCH VERIO Strp USE 1 STRIP ONCE DAILY IN VITRO  3    PLAVIX 75 mg tablet TAKE 1 BY MOUTH DAILY (Patient taking differently: TAKE 1 BY MOUTH EVERY MORNING) 90 tablet 0    sevelamer carbonate (RENVELA) 800 mg Tab Take 3 to 4 tablets by mouth twice daily as needed      vitamin renal formula, B-complex-vitamin c-folic acid, (B COMPLEX-C-FOLIC ACID) 1 mg Cap Take 1 capsule by mouth once daily. 1 Capsule Oral Every day         Past Surgical History:    Procedure Laterality Date    CARDIAC CATHETERIZATION      PCI x 2     SECTION, CLASSIC      COLONOSCOPY N/A 2018    Performed [facility] Rodrigue Russell [facility] (2ND FLR)    DEBRIDEMENT-WOUND Left 2016    Performed by [facility]ressa Maddox [facility] at [facility] OR    DIALYSIS FISTULA CREATION      multiple fistulas and grafts before PD     EGD (ESOPHAGOGASTRODUODENOSCOPY) N/A 3/14/2019    Performed by Adolph [facility]ila [facility] at [facility] ENDO    EGD (ESOPHAGOGASTRODUODENOSCOPY) N/A 3/13/2019    Performed by Adolph Davila MD at [facility] ENDO    INCISION AND DRAINAGE (I&D), LABIAL N/A 2016    Performed by Harmony Fernandez MD at [facility] OR    INCISION AND DRAINAGE (I&D), LABIAL (ADD ON) Left 2016    Performed by Teressa Maddox MD at [facility] OR    INCISION AND DRAINAGE OF WOUND Left     VULVAR ABCESS WITH NECROSIS    INSERTION, CATHETER, TUNNELED ABORTED Left 3/14/2019    Performed by Servando Solis MD at [facility] OR    ORIF, HIP Left 2018    Performed by Tevin Grullon MD at [facility] OR    PERITONEAL CATHETER INSERTION      PERMCATH REWIRE- TUNNELED CATH REWIRE Left 2017    Performed by Baldev Munroe MD at [facility] CATH LAB    PERMCATH REWIRE- TUNNELED CATH REWIRE N/A 10/5/2017    Performed by Baldev Munroe MD at [facility] CATH LAB    REMOVAL, CATHETER, DIALYSIS, PERITONEAL N/A 3/14/2019    Performed by Servando Solis MD at [facility] OR    UMBILICAL HERNIA REPAIR         Social History     Socioeconomic History    Marital status: Single     Spouse name: Not on file    Number of children: Not on file    Years of education: Not on file    Highest education level: Not on file   Social Needs    Financial resource strain: Not on file    Food insecurity - worry: Not on file    Food insecurity - inability: Not on file    Transportation needs - medical: Not on file    Transportation needs - non-medical: Not on file   Occupational History     Employer: [employer]   Tobacco Use     Smoking status: Never Smoker    Smokeless tobacco: Never Used   Substance and Sexual Activity    Alcohol use: No     Comment: Pt reports some social use of about 1-2 drinks about every six months.    Drug use: No    Sexual activity: Not Currently     Partners: Male     Birth control/protection: None   Other Topics Concern    Not on file   Social History Narrative     [place]    Single    One child    History of blood transfusion in 2004    Potential donor ??? brother       OBJECTIVE:     Vital Signs Range (Last 24H):  Temp:  [36.3 °C (97.4 °F)-36.6 °C (97.9 °F)]   Pulse:  []   Resp:  [10-28]   BP: (108-159)/()   SpO2:  [97 %-100 %]       Significant Labs:  Lab Results   Component Value Date    WBC 8.97 03/18/2019    HGB 10.4 (L) 03/18/2019    HCT 32.3 (L) 03/18/2019     03/18/2019    CHOL 199 02/24/2017    TRIG 460 (H) 02/24/2017    HDL 19 (L) 02/24/2017    ALT <5 (L) 03/18/2019    AST 14 03/18/2019     (L) 03/18/2019    K 4.4 03/18/2019     03/18/2019    CREATININE 7.3 (H) 03/18/2019    BUN 33 (H) 03/18/2019    CO2 19 (L) 03/18/2019    TSH 2.212 02/24/2017    INR 1.6 (H) 03/18/2019    HGBA1C 6.3 (H) 09/12/2018       EKG:   Sinus tachycardia with Fusion complexes  Possible Anterior infarct ,age undetermined  ST and T wave abnormality, consider lateral ischemia    TTE:  · Moderately decreased left ventricular systolic function. The estimated ejection fraction is 30%  · Grade II (moderate) left ventricular diastolic dysfunction consistent with pseudonormalization.  · Elevated left atrial pressure.  · Normal right ventricular systolic function.  · Mild aortic valve stenosis.  · Aortic valve area is 1.68 cm2; peak velocity is 2.28 m/s; mean gradient is 12 mmHg.  · Normal central venous pressure (3 mm Hg).  · The estimated PA systolic pressure is 27 mm Hg      ASSESSMENT/PLAN:       Anesthesia Evaluation    I have reviewed the Patient Summary Reports.    I have  reviewed the Nursing Notes.   I have reviewed the Medications.     Review of Systems  Anesthesia Hx:  No problems with previous Anesthesia  History of prior surgery of interest to airway management or planning: Denies Family Hx of Anesthesia complications.   Denies Personal Hx of Anesthesia complications.   Social:  Non-Smoker    Hematology/Oncology:     Oncology Normal    -- Anemia: Anemia of Chronic Kidney Disease  Chronic    EENT/Dental:EENT/Dental Normal   Cardiovascular:   Exercise tolerance: poor Hypertension, well controlled Past MI CAD  CABG/stent (BMS 2012)   Denies Angina. CHF         PVD     ECG has been reviewed. TTE: EF 30%, G2DD, Mild aortic valve stenosis. ARNOLD 1.68 cm2; peak velocity is 2.28 m/s; mean gradient is 12 mmHg.    EKG: NSR    Stents 2005   Pulmonary:  Pulmonary Normal    Renal/:   Chronic Renal Disease, ESRD    Musculoskeletal:   Arthritis   Spine Disorders: (Spinal stenosis) lumbar Chronic Pain and Disc disease    Neurological:   TIA, CVA, no residual symptoms Headaches   Peripheral Neuropathy    Endocrine:   Diabetes, well controlled, using insulin    Dermatological:  Skin Normal    Psych:  Psychiatric Normal           Physical Exam  General:  Obesity    Airway/Jaw/Neck:  Airway Findings: Mouth Opening: Normal Tongue: Normal  General Airway Assessment: Adult  Mallampati: III  Improves to II with phonation.  TM Distance: Normal, at least 6 cm  Jaw/Neck Findings:  Neck ROM: Normal ROM  Neck Findings: Normal     Dental:  Dental Findings: In tact, Periodontal disease, Mild   Chest/Lungs:  Chest/Lungs Findings: Clear to auscultation, Normal Respiratory Rate     Heart/Vascular:  Heart Findings: Rate: Normal  Rhythm: Regular Rhythm  Sounds: Normal  Heart murmur: negative       Mental Status:  Mental Status Findings:  Cooperative, Alert and Oriented         Anesthesia Plan  Type of Anesthesia, risks & benefits discussed:  Anesthesia Type:  general, MAC, regional  Patient's Preference:    Intra-op Monitoring Plan: standard ASA monitors  Intra-op Monitoring Plan Comments:   Post Op Pain Control Plan: per primary service following discharge from PACU, IV/PO Opioids PRN and multimodal analgesia  Post Op Pain Control Plan Comments:   Induction:   IV  Beta Blocker:  Patient is on a Beta-Blocker and has received one dose within the past 24 hours (No further documentation required).       Informed Consent: Patient understands risks and agrees with Anesthesia plan.  Questions answered. Anesthesia consent signed with patient.  ASA Score: 4     Day of Surgery Review of History & Physical:            Ready For Surgery From Anesthesia Perspective.

## 2019-03-19 NOTE — PLAN OF CARE
Problem: Occupational Therapy Goal  Goal: Occupational Therapy Goal  Eval complete, no OT needs at this time.  Safe to return home when medically clear.  Reorder OT if status deteriorates.    Eval complete. Pt tolerated session well. D/C from OT.

## 2019-03-19 NOTE — PLAN OF CARE
03/19/19 1251   Discharge Assessment   Assessment Type Discharge Planning Assessment   Confirmed/corrected address and phone number on facesheet? Yes   Assessment information obtained from? Patient;Medical Record   Expected Length of Stay (days) 5   Communicated expected length of stay with patient/caregiver yes  (Per MD)   Prior to hospitilization cognitive status: Alert/Oriented;No Deficits   Prior to hospitalization functional status: Independent;Assistive Equipment   Current cognitive status: Alert/Oriented;No Deficits   Current Functional Status: Assistive Equipment   Facility Arrived From: [facility]   Lives With child(crystal), adult  (Stepdaughter)   Able to Return to Prior Arrangements yes   Is patient able to care for self after discharge? Yes   Who are your caregiver(s) and their phone number(s)? Peyton Rock (Father) [phone]   Patient's perception of discharge disposition home or selfcare   Readmission Within the Last 30 Days other (see comments)  (Patient transferred from another [facility] facility)   Patient currently being followed by outpatient case management? No   Patient currently receives home health services? No   Patient currently receives any other outside agency services? No   Equipment Currently Used at Home shower chair;rollator;grab bar;other (see comments)  (Peritoneal Dialysis supplies)   Do you have any problems affording any of your prescribed medications? No   Is the patient taking medications as prescribed? yes   Does the patient have transportation home? Yes   Transportation Anticipated family or friend will provide   Dialysis Name and Scheduled days [facility] for Peritoneal Dialysis   Does the patient receive services at the Coumadin Clinic? No   Discharge Plan A Home with family   Discharge Plan B Home with family;Home Health   DME Needed Upon Discharge  none   Patient/Family in Agreement with Plan yes     CM to the Bedside to see the patient. Family also  present at the time of visit. Per the patient she resides with her Stepdaughter in a 1 Story Home with 1 ИВАН. The patient endorses the use of the following DME to assist with ADL's: Rollator, Built-in Shower Chair, and [facility] is used for Peritoneal Dialysis supplies. The patient does use of a Glucometer to monitor Blood Sugars at Home. CM notified the patient that she will be followed throughout this hospital admission for D/C needs and/or recommendations. Current D/C plan is Home with Home Health vs Home No Needs. CM name and number placed on the board at the Bedside and the patient was instructed to call with any D/C needs or questions. Understanding verbalized.

## 2019-03-19 NOTE — PT/OT/SLP EVAL
Occupational Therapy   Evaluation and Discharge Note    Name: Jenni Todd  MRN: [id number]  Admitting Diagnosis:  Hemodialysis catheter dysfunction 1 Day Post-Op    Recommendations:     Discharge Recommendations: home  Discharge Equipment Recommendations:  none  Barriers to discharge:  None    Assessment:     Jenni Todd is a 48 y.o. female with a medical diagnosis of Hemodialysis catheter dysfunction. At this time, patient is functioning at their prior level of function and does not require further acute OT services.     Plan:     During this hospitalization, patient does not require further acute OT services.  Please re-consult if situation changes.    · Plan of Care Reviewed with: patient, daughter    Subjective     Chief Complaint: Weakness  Patient/Family Comments/goals: Medical management and discharge.       Occupational Profile:  Living Environment: Pt lives with her step daughter and her father lives nearby and is often at her house. She has a 1 story house with 1 ИВАН. Pt has a tub/shower combo with a shower chair she uses for seated bathing.   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     Pain/Comfort:  · Pain Rating 1: 0/10(Not rated)  · Pain Rating Post-Intervention 1: 0/10(no change)    Patients cultural, spiritual, Shinto conflicts given the current situation: no    Objective:     Communicated with: RN prior to session.  Patient found up in chair with pulse ox (continuous), telemetry upon OT entry to room.    General Precautions: Standard, fall   Orthopedic Precautions:N/A   Braces: N/A     Occupational Performance:    Bed Mobility:    · N/A    Functional Mobility/Transfers:  · Patient completed Sit <> Stand Transfer with moderate assistance  with  hand-held assist and rolling walker   · Functional Mobility: Able to walk short household distances w/ a RW  CGA for safety.     Activities of Daily Living:  · Toileting: modified independence seated on BSC.     Cognitive/Visual Perceptual:  Completely cognitively intact adult w/ no glasses worn or present during eval.       Physical Exam:  Balance:    -       Seated: Fair +, Standing: Fair -  Skin integrity: Visible skin intact, Dry and BUE skin is flaky.   Dominant hand:    -       RH  Upper Extremity Range of Motion:     -       Right Upper Extremity: WFL  -       Left Upper Extremity: WFL  Upper Extremity Strength:    -       Right Upper Extremity: WFL  -       Left Upper Extremity: WFL   Strength:    -       Right Upper Extremity: WFL  -       Left Upper Extremity: WFL  Fine Motor Coordination:    -       Intact  Gross motor coordination:   WFL    AMPAC 6 Click ADL:  AMPAC Total Score: 23    Treatment & Education:  -Pt edu on OT role/POC  -Importance of OOB activity with staff assistance  -Safety during functional t/f and mobility  - White board updated  - Multiple self care tasks/functional mobility completed-- assistance level noted above  - All questions/concerns answered within OT scope of practice    Education:    Patient left up in chair with all lines intact, call button in reach, RN notified and step-daughter present    GOALS:   Multidisciplinary Problems     Occupational Therapy Goals        Problem: Occupational Therapy Goal    Goal Priority Disciplines Outcome Interventions   Occupational Therapy Goal     OT, PT/OT     Description:  Eval complete, no OT needs at this time.  Safe to return home when medically clear.  Reorder OT if status deteriorates.                      History:     Past Medical History:   Diagnosis Date    Abnormal finding on Pap smear, HPV DNA positive 2014    Anemia associated with chronic renal failure     on Epogen    Blood type B+     Bulging discs - symptomatic      CAD (coronary artery disease)     Cardiomyopathy 3/13/2019    Diabetes mellitus, type 2     ESRD (end  stage renal disease) 2004    FSGS (focal segmental glomerulosclerosis)     with collapsing    Hyperlipidemia     Hypertension 2004    Neuropathy     Obesity     Secondary hyperparathyroidism, renal     TIA (transient ischemic attack)     Uterine fibroid     small uterine        Past Surgical History:   Procedure Laterality Date    CARDIAC CATHETERIZATION      PCI x 2     SECTION, CLASSIC      COLONOSCOPY N/A 2018    Performed by Rodrigue Russell MD at [facility] (2ND FLR)    DEBRIDEMENT-WOUND Left 2016    Performed by [facility] Maddox [facility] at [facility] OR    DIALYSIS FISTULA CREATION      multiple fistulas and grafts before PD     EGD (ESOPHAGOGASTRODUODENOSCOPY) N/A 3/14/2019    Performed by Adolph [facility]ila [facility] at [facility] ENDO    EGD (ESOPHAGOGASTRODUODENOSCOPY) N/A 3/13/2019    Performed by Adolph [facility]ila MD at [facility] ENDO    INCISION AND DRAINAGE (I&D), LABIAL N/A 2016    Performed by Harmony Fernandez MD at [facility] OR    INCISION AND DRAINAGE (I&D), LABIAL (ADD ON) Left 2016    Performed by Teressa Maddox MD at [facility] OR    INCISION AND DRAINAGE OF WOUND Left     VULVAR ABCESS WITH NECROSIS    INSERTION, CATHETER, TUNNELED ABORTED Left 3/14/2019    Performed by Servando Solis MD at [facility] OR    ORIF, HIP Left 2018    Performed by Tevin Grullon MD at [facility] OR    PERITONEAL CATHETER INSERTION      PERMCATH REWIRE- TUNNELED CATH REWIRE Left 2017    Performed by Baldev Munroe MD at [facility] CATH LAB    PERMCATH REWIRE- TUNNELED CATH REWIRE N/A 10/5/2017    Performed by Baldev Munroe MD at [facility] CATH LAB    REMOVAL, CATHETER, DIALYSIS, PERITONEAL N/A 3/14/2019    Performed by Servando Solis MD at [facility] OR    UMBILICAL HERNIA REPAIR         Time Tracking:     OT Date of Treatment: 19  OT Start Time: 954  OT Stop Time: 1007  OT Total Time (min): 13 min    Billable Minutes:Evaluation 13 mins    Lexx Shore, OT  3/19/2019

## 2019-03-19 NOTE — PLAN OF CARE
Problem: Physical Therapy Goal  Goal: Physical Therapy Goal  Goals to be met by: 2019     Patient will increase functional independence with mobility by performin. Supine to sit with Modified [place]  2. Sit to supine with Modified [place]  3. Sit to stand transfer with Modified [place]  4. Gait  x 150 feet with Supervision using Rolling Walker.   5. Ascend/Descend 4 inch curb step with Supervision using Rolling Walker.    Outcome: Ongoing (interventions implemented as appropriate)  Eval completed and POC established    Adolph Zayas PT,DPT  3/19/2019

## 2019-03-19 NOTE — PROGRESS NOTES
Hemodialysis    2 hours remaining time:    Sudden increased in venous pressure. QB: 250ml/min venous pressure 260. Rinsed back. Clots accumulated in both arterial and venous chambers.    Dialyzer and bloodlines changed. Repriming done.     HD resumed using open system (venous connected to drain until blood sensed)

## 2019-03-19 NOTE — PROGRESS NOTES
[facility] Hospital Medicine                                                                     Progress Note     Team: [facility] HOSP MED A Guevara Metzger MD   Admit Date: 3/18/2019   Hospital Day: 1  JESSY:  3/21/2018  Code status: Full Code   Principal Problem: Hemodialysis catheter dysfunction       SUMMARY:     Jenni Todd is a 48 y.o. female w/ CAD s/p 2 PCI (2012), HFrEF 30%, HTN, DM, and ESRD on home PD admitted to [facility] on 2/21 for peritonitis. PD catheter removed. Due to hx of recurrent peritonitis she was transitioned to HD. Bilateral IJ tunneled catheters were attempted without success. Now has a temporary dialysis catheter in her left fem. Transferred to [facility] for vascular. Nephrology access team attempted a tunneled line and were unsuccessful.  Vascular surgery consulted for HD access. Hospital course complicated by GIB with low risk ulcer. Awaiting permanent cath.     SUBJECTIVE:     Pt was seen and examined at bedside. Pt had no acute events overnight, and no new complaints this morning. Pt remained hemodynamically stable and afebrile. Eating well and ambulating okay. No complaints at this time.    ROS (Positive in Bold, otherwise negative)  Pain Scale: 0 /10   Constitutional: fever, chills, night sweats  CV: chest pain, edema, palpitations  Resp: SOB, cough, sputum production  GI: changes in appetite, NVDC, pain, melena, hematochezia, GERD, hematemesis  : Dysuria, hematuria, urinary urgency, frequency  MSK: arthralgia/myalgia, joint swelling  SKIN: rashes, pruritis, petechiae   Neuro/Psych: FND, anxiety, depression      OBJECTIVE:     Vitals:  Temp:  [96.8 °F (36 °C)-99.6 °F (37.6 °C)]   Pulse:  []   Resp:  [10-26]   BP: (112-157)/(55-87)   SpO2:  [97 %-100 %]      I & O (Last 24H):     Intake/Output Summary (Last 24 hours) at  3/19/2019 1748  Last data filed at 3/19/2019 0730  Gross per 24 hour   Intake 800 ml   Output 2300 ml   Net -1500 ml         GEN: in no acute distress. Nontoxic. Resting in bed. Cooperative.  HEENT: NCAT. PERRL. EOMI. Conjunctivae/corneas clear, sclera Anicteric.  CVS: RRR. Normal s1 s2 no murmur, click, rub or gallop  LUNG: CTAB. Normal respiratory effort. No wheezes, rhonchi, or crackles.  ABD: PD site noted. Normoactive BS, soft, NT, ND, no masses or organomegaly.  EXT: No edema. No cyanosis. Full ROM.  SKIN: color, texture, turgor normal. No rashes or lesions  NEURO: Alert, oriented x 4, Spont mvt of all extremities with no focal deficits noted.      All recent labs and imaging has been reviewed.     Recent Results (from the past 24 hour(s))   POCT glucose    Collection Time: 03/18/19  9:32 PM   Result Value Ref Range    POCT Glucose 120 (H) 70 - 110 mg/dL   Comprehensive Metabolic Panel (CMP)    Collection Time: 03/19/19  7:50 AM   Result Value Ref Range    Sodium 134 (L) 136 - 145 mmol/L    Potassium 3.9 3.5 - 5.1 mmol/L    Chloride 97 95 - 110 mmol/L    CO2 25 23 - 29 mmol/L    Glucose 118 (H) 70 - 110 mg/dL    BUN, Bld 20 6 - 20 mg/dL    Creatinine 5.8 (H) 0.5 - 1.4 mg/dL    Calcium 8.8 8.7 - 10.5 mg/dL    Total Protein 7.1 6.0 - 8.4 g/dL    Albumin 1.9 (L) 3.5 - 5.2 g/dL    Total Bilirubin 0.2 0.1 - 1.0 mg/dL    Alkaline Phosphatase 106 55 - 135 U/L    AST 14 10 - 40 U/L    ALT 5 (L) 10 - 44 U/L    Anion Gap 12 8 - 16 mmol/L    eGFR if African American 9.1 (A) >60 mL/min/1.73 m^2    eGFR if non  7.9 (A) >60 mL/min/1.73 m^2   Magnesium    Collection Time: 03/19/19  7:50 AM   Result Value Ref Range    Magnesium 1.8 1.6 - 2.6 mg/dL   Phosphorus    Collection Time: 03/19/19  7:50 AM   Result Value Ref Range    Phosphorus 3.8 2.7 - 4.5 mg/dL   CBC with Automated Differential    Collection Time: 03/19/19  7:50 AM   Result Value Ref Range    WBC 8.62 3.90 - 12.70 K/uL    RBC 3.99 (L) 4.00 - 5.40  M/uL    Hemoglobin 12.2 12.0 - 16.0 g/dL    Hematocrit 37.4 37.0 - 48.5 %    MCV 94 82 - 98 fL    MCH 30.6 27.0 - 31.0 pg    MCHC 32.6 32.0 - 36.0 g/dL    RDW 19.3 (H) 11.5 - 14.5 %    Platelets 320 150 - 350 K/uL    MPV 9.6 9.2 - 12.9 fL    Immature Granulocytes 1.0 (H) 0.0 - 0.5 %    Gran # (ANC) 6.2 1.8 - 7.7 K/uL    Immature Grans (Abs) 0.09 (H) 0.00 - 0.04 K/uL    Lymph # 1.7 1.0 - 4.8 K/uL    Mono # 0.5 0.3 - 1.0 K/uL    Eos # 0.1 0.0 - 0.5 K/uL    Baso # 0.04 0.00 - 0.20 K/uL    nRBC 2 (A) 0 /100 WBC    Gran% 72.4 38.0 - 73.0 %    Lymph% 19.5 18.0 - 48.0 %    Mono% 5.8 4.0 - 15.0 %    Eosinophil% 0.8 0.0 - 8.0 %    Basophil% 0.5 0.0 - 1.9 %    Differential Method Automated    Protime-INR    Collection Time: 03/19/19  7:50 AM   Result Value Ref Range    Prothrombin Time 16.4 (H) 9.0 - 12.5 sec    INR 1.7 (H) 0.8 - 1.2   POCT glucose    Collection Time: 03/19/19  8:03 AM   Result Value Ref Range    POCT Glucose 122 (H) 70 - 110 mg/dL   POCT glucose    Collection Time: 03/19/19 12:14 PM   Result Value Ref Range    POCT Glucose 221 (H) 70 - 110 mg/dL       Recent Labs   Lab 03/18/19  0311 03/18/19  0815 03/18/19  1227 03/18/19  2132 03/19/19  0803 03/19/19  1214   POCTGLUCOSE 90 81 89 120* 122* 221*       Hemoglobin A1C   Date Value Ref Range Status   09/12/2018 6.3 (H) 4.0 - 5.6 % Final     Comment:     ADA Screening Guidelines:  5.7-6.4%  Consistent with prediabetes  >or=6.5%  Consistent with diabetes  High levels of fetal hemoglobin interfere with the HbA1C  assay. Heterozygous hemoglobin variants (HbS, HgC, etc)do  not significantly interfere with this assay.   However, presence of multiple variants may affect accuracy.     11/30/2017 7.9 (H) 4.0 - 5.6 % Final     Comment:     According to ADA guidelines, hemoglobin A1c <7.0% represents  optimal control in non-pregnant diabetic patients. Different  metrics may apply to specific patient populations.   Standards of Medical Care in Diabetes-2016.  For the  purpose of screening for the presence of diabetes:  <5.7%     Consistent with the absence of diabetes  5.7-6.4%  Consistent with increasing risk for diabetes   (prediabetes)  >or=6.5%  Consistent with diabetes  Currently, no consensus exists for use of hemoglobin A1c  for diagnosis of diabetes for children.  This Hemoglobin A1c assay has significant interference with fetal   hemoglobin   (HbF). The results are invalid for patients with abnormal amounts of   HbF,   including those with known Hereditary Persistence   of Fetal Hemoglobin. Heterozygous hemoglobin variants (HbAS, HbAC,   HbAD, HbAE, HbA2) do not significantly interfere with this assay;   however, presence of multiple variants in a sample may impact the %   interference.     02/22/2017 9.0 (H) 4.5 - 6.2 % Final     Comment:     According to ADA guidelines, hemoglobin A1C <7.0% represents  optimal control in non-pregnant diabetic patients.  Different  metrics may apply to specific populations.   Standards of Medical Care in Diabetes - 2016.  For the purpose of screening for the presence of diabetes:  <5.7%     Consistent with the absence of diabetes  5.7-6.4%  Consistent with increasing risk for diabetes   (prediabetes)  >or=6.5%  Consistent with diabetes  Currently no consensus exists for use of hemoglobin A1C  for diagnosis of diabetes for children.          Active Hospital Problems    Diagnosis  POA    *Hemodialysis catheter dysfunction [T82.41XA]  Yes    Pressure injury of left buttock, stage 2 [L89.322]  Yes    Delayed surgical wound healing [T81.89XA]  Yes    Chronic systolic heart failure [I50.22]  Yes    Acute gastric ulcer with hemorrhage [K25.0]  Yes    Antibiotic-associated diarrhea [K52.1, T36.95XA]  Yes    DVT (deep venous thrombosis) [I82.409]  Unknown    Peritonitis associated with peritoneal dialysis [T85.71XA]  Yes    Hypertension [I10]  Yes    Diabetes mellitus, type 2 [E11.9]  Yes    CAD (coronary artery disease) [I25.10]  Yes      Cath 12/27/2012:   RCA PCI 2.5 x 18 and 3.0 x 26 mm BMS   Patent LAD and LCX   Normal EF     Dr. Berrios for NSTEMI       DAPT score 3         ESRD (end stage renal disease) on PD ( initiated dialysis 04/20/2004) [N18.6]  Yes     Nightly PD        Resolved Hospital Problems   No resolved problems to display.          ASSESSMENT AND PLAN:       ESRD on HD  Status post removal of PD catheter for concern regarding mutliple organsims growing in peritoneal fluid. Attempts at tunneled catheter in IJ x 2 during this admission. Left femoral trialysis placed.   - Nephrology following  - Currently have left femoral catheter  - Plan for OR tomorrow per vascular for permanent cath   - NPO at midnight     Peritonitis due to peritoneal dialysis catheter   - plan is for cefepime until March 28th, renal dosing  - wound care consulted for PD site wound care    Antibiotic-associated diarrhea  - not consistent with C diff.  Has been C diff negative.    - barrier creams and antidiarrheals ordered     CAD  - remote stents. Was on DAPT, continuing on Asa (due to bleed plavix held)  - continue BB    Acute gastric ulcer with hemorrhage  GIB  - no more bleeding, on PPI  - waiting for H.pylori testing results    Pressure injury stage 2 on left buttock   - consulted wound care    Chronic systolic heart failure  - EF 30% on TTE, stable  - Continue BB and add GDMT as able    Diabetes mellitus, type 2  - A1c 6.3, was getting intra-peritoneal insulin. Catheter now removed.   - SSI for now       Prophylaxis- SCDs for now for surgery tomorrow    Code Status- Full Code     Discharge plan and follow up - d/c home once medically stable, need HD chair    Guevara Metzger MD  [facility] Medicine Staff  Pager [phone]

## 2019-03-19 NOTE — PLAN OF CARE
Problem: Infection  Goal: Infection Symptom Resolution  Outcome: Ongoing (interventions implemented as appropriate)  Patient's v/s stable. HD administered this am. Patient continue to have watery stools approx. Every hour. Patient refuses a flesiseal. States she had one before and didn't work.

## 2019-03-19 NOTE — CONSULTS
Midline placed  in right forearm, size [id number] Lot# [id number] Removal date on or before 4/17/19. Needle advanced into vessel with real time ultrasound guidance. Image recorded and saved.

## 2019-03-19 NOTE — PROGRESS NOTES
Hemodialysis    4-hour HD completed, patient tolerated well. Rinsed back.  Lines clotted once mid-HD.    NET UF REMOVED: 1.5 liters. Left femoral catheter flushed and locked with Normal Saline, capped and secured.    Report given to primary nurse.

## 2019-03-19 NOTE — PROGRESS NOTES
Wound care consult received from MD for skin breakdown to buttocks and abdomen.  Pt admitted with ESRD with removal of peritoneal catheter on 3/14.  Upon assessment of the abdomen wounds from the peritoneal catheter sites, noted 50/50 slough/pink moist tissue with no visible signs of infection. Also noted Stage 2 pressure injury to L buttock.  See assessment and photo below.    Discussed recommendations with pt and .  Recommendations:  -Pressure injury prevention interventions to include waffle overlay   -Triad ointment BID and prn cleaning to L buttock  -Silver hydrofiber, Aquacel AG ribbon, 2x/wk to abdomen wounds  -Wound care team to follow prn  [id number]     03/19/19 1009   Coping/Psychosocial   Plan of Care Reviewed With patient;daughter       Incision/Site 03/14/19 1445 Left Abdomen   Date First Assessed/Time First Assessed: 03/14/19 1445   Side: Left  Location: Abdomen   Wound Image    Incision WDL ex   Dressing Appearance Intact   Drainage Amount Small   Drainage Characteristics/Odor Serosanguineous   Appearance Pink;Red;Slough;Adipose   Periwound Area Intact   Wound Edges Open   Wound Length (cm) 1 cm   Wound Width (cm) 2.2 cm   Wound Depth (cm) 2 cm   Wound Volume (cm^3) 4.4 cm^3   Wound Surface Area (cm^2) 2.2 cm^2   Tunneling (depth (cm)/location) 1@ 11 o'clock   Care Cleansed with:;Sterile normal saline   Dressing Applied;Foam   Packing Incision packed with  (amd packing, ordered silver hydrofiber)   Dressing Change Due 03/19/19       Incision/Site 03/19/19 Right Abdomen   Date First Assessed: 03/19/19   Side: Right  Location: Abdomen   Wound Image    Incision WDL ex   Dressing Appearance Intact   Drainage Amount Small   Drainage Characteristics/Odor Serosanguineous   Appearance Pink;Red;Slough;Adipose   Periwound Area Intact   Wound Edges Open   Wound Length (cm) 1 cm   Wound Width (cm) 2 cm   Wound Depth (cm) 1.5 cm   Wound Volume (cm^3) 3 cm^3   Wound Surface Area (cm^2) 2 cm^2   Care Cleansed  with:;Sterile normal saline   Dressing Applied;Foam   Packing Incision packed with  (amd packing, placed orders for silver hydrofiber)   Dressing Change Due 03/19/19       Incision/Site 03/19/19 Abdomen lower   Date First Assessed: 03/19/19   Location: Abdomen  Orientation: lower   Wound Image    Incision WDL ex   Dressing Appearance Intact   Drainage Amount Small   Drainage Characteristics/Odor Serosanguineous   Appearance Pink;Red;Slough;Adipose   Periwound Area Intact   Wound Edges Open   Wound Length (cm) 0.2 cm   Wound Width (cm) 4 cm   Wound Depth (cm) 2 cm   Wound Volume (cm^3) 1.6 cm^3   Wound Surface Area (cm^2) 0.8 cm^2   Tunneling (depth (cm)/location) 3.52 11 o'clock   Care Cleansed with:;Sterile normal saline   Dressing Applied;Foam   Packing Incision packed with  (amd packing, ordered silver hydrofiber)   Dressing Change Due 03/19/19       Pressure Injury 03/07/19 1325 Left Buttocks Stage 2   Date First Assessed/Time First Assessed: 03/07/19 1325   Pressure Injury Present on Admission: no  Side: Left  Location: Buttocks  Staging: Stage 2   Wound Image    Staging 2   Dressing Appearance Intact   Drainage Amount Scant   Drainage Characteristics/Odor Clear   Appearance Pink   Tissue loss description Partial thickness   Red (%), Wound Tissue Color 100 %   Periwound Area Intact   Wound Edges Open   Wound Length (cm) 1.3 cm   Wound Width (cm) 1 cm   Wound Depth (cm) 0.1 cm   Wound Volume (cm^3) 0.13 cm^3   Wound Surface Area (cm^2) 1.3 cm^2   Care Cleansed with:;Sterile normal saline;Applied:;Skin Barrier  (Triad)   Dressing Change Due 03/19/19

## 2019-03-20 NOTE — PROGRESS NOTES
[facility] Hospital Medicine                                                                     Progress Note     Team: [facility] A Guevara Metzger MD   Admit Date: 3/18/2019   Hospital Day: 2  JESSY:  3/21/2018  Code status: Full Code   Principal Problem: Hemodialysis catheter dysfunction       SUMMARY:     Jenni Todd is a 48 y.o. female w/ CAD s/p 2 PCI (2012), HFrEF 30%, HTN, DM, and ESRD on home PD admitted to [facility] on 2/21 for peritonitis. PD catheter removed. Due to hx of recurrent peritonitis she was transitioned to HD. Bilateral IJ tunneled catheters were attempted without success. Now has a temporary dialysis catheter in her left fem. Transferred [place] [facility] [place]. Nephrology access team attempted a tunneled line and were unsuccessful.  Vascular surgery consulted for HD access. Hospital course complicated by GIB with low risk ulcer. Awaiting permanent cath, planned for 3/20.     SUBJECTIVE:     Pt was seen and examined at bedside. Pt had no acute events overnight, and no new complaints this morning. Pt remained hemodynamically stable and afebrile. No complaints at this time. NPO for procedure today.       ROS (Positive in Bold, otherwise negative)  Pain Scale: 0 /10   Constitutional: fever, chills, night sweats  CV: chest pain, edema, palpitations  Resp: SOB, cough, sputum production  GI: changes in appetite, NVDC, pain, melena, hematochezia, GERD, hematemesis  : Dysuria, hematuria, urinary urgency, frequency  MSK: arthralgia/myalgia, joint swelling  SKIN: rashes, pruritis, petechiae   Neuro/Psych: FND, anxiety, depression      OBJECTIVE:     Vitals:  Temp:  [97.8 °F (36.6 °C)-99.6 °F (37.6 °C)]   Pulse:  [74-98]   Resp:  [15-20]   BP: ()/(45-88)   SpO2:  [96 %-99 %]      I & O (Last 24H):     Intake/Output Summary (Last 24  hours) at 3/20/2019 1105  Last data filed at 3/19/2019 1900  Gross per 24 hour   Intake 360 ml   Output 1000 ml   Net -640 ml         GEN: in no acute distress. Nontoxic. Resting in bed. Cooperative.  HEENT: NCAT. PERRL. EOMI. Conjunctivae/corneas clear, sclera Anicteric.  CVS: RRR. Normal s1 s2 no murmur, click, rub or gallop  LUNG: CTAB. Normal respiratory effort. No wheezes, rhonchi, or crackles.  ABD: PD site noted. Normoactive BS, soft, NT, ND, no masses or organomegaly.  EXT: No edema. No cyanosis. Full ROM.  SKIN: color, texture, turgor normal. No rashes or lesions  NEURO: Alert, oriented x 4, Spont mvt of all extremities with no focal deficits noted.      All recent labs and imaging has been reviewed.     Recent Results (from the past 24 hour(s))   POCT glucose    Collection Time: 03/19/19 12:14 PM   Result Value Ref Range    POCT Glucose 221 (H) 70 - 110 mg/dL   POCT glucose    Collection Time: 03/19/19  5:45 PM   Result Value Ref Range    POCT Glucose 115 (H) 70 - 110 mg/dL   POCT glucose    Collection Time: 03/19/19 10:59 PM   Result Value Ref Range    POCT Glucose 146 (H) 70 - 110 mg/dL   Comprehensive Metabolic Panel (CMP)    Collection Time: 03/20/19  7:02 AM   Result Value Ref Range    Sodium 134 (L) 136 - 145 mmol/L    Potassium 4.5 3.5 - 5.1 mmol/L    Chloride 104 95 - 110 mmol/L    CO2 18 (L) 23 - 29 mmol/L    Glucose 119 (H) 70 - 110 mg/dL    BUN, Bld 27 (H) 6 - 20 mg/dL    Creatinine 7.4 (H) 0.5 - 1.4 mg/dL    Calcium 7.9 (L) 8.7 - 10.5 mg/dL    Total Protein 6.4 6.0 - 8.4 g/dL    Albumin 1.7 (L) 3.5 - 5.2 g/dL    Total Bilirubin 0.2 0.1 - 1.0 mg/dL    Alkaline Phosphatase 96 55 - 135 U/L    AST 13 10 - 40 U/L    ALT <5 (L) 10 - 44 U/L    Anion Gap 12 8 - 16 mmol/L    eGFR if African American 6.8 (A) >60 mL/min/1.73 m^2    eGFR if non African American 5.9 (A) >60 mL/min/1.73 m^2   Magnesium    Collection Time: 03/20/19  7:02 AM   Result Value Ref Range    Magnesium 1.7 1.6 - 2.6 mg/dL    Phosphorus    Collection Time: 03/20/19  7:02 AM   Result Value Ref Range    Phosphorus 4.7 (H) 2.7 - 4.5 mg/dL   CBC with Automated Differential    Collection Time: 03/20/19  7:02 AM   Result Value Ref Range    WBC 9.31 3.90 - 12.70 K/uL    RBC 3.93 (L) 4.00 - 5.40 M/uL    Hemoglobin 11.8 (L) 12.0 - 16.0 g/dL    Hematocrit 39.7 37.0 - 48.5 %     (H) 82 - 98 fL    MCH 30.0 27.0 - 31.0 pg    MCHC 29.7 (L) 32.0 - 36.0 g/dL    RDW 19.5 (H) 11.5 - 14.5 %    Platelets 363 (H) 150 - 350 K/uL    MPV 10.2 9.2 - 12.9 fL    Immature Granulocytes 1.1 (H) 0.0 - 0.5 %    Gran # (ANC) 5.8 1.8 - 7.7 K/uL    Immature Grans (Abs) 0.10 (H) 0.00 - 0.04 K/uL    Lymph # 2.4 1.0 - 4.8 K/uL    Mono # 0.8 0.3 - 1.0 K/uL    Eos # 0.1 0.0 - 0.5 K/uL    Baso # 0.08 0.00 - 0.20 K/uL    nRBC 3 (A) 0 /100 WBC    Gran% 62.4 38.0 - 73.0 %    Lymph% 26.1 18.0 - 48.0 %    Mono% 8.4 4.0 - 15.0 %    Eosinophil% 1.1 0.0 - 8.0 %    Basophil% 0.9 0.0 - 1.9 %    Platelet Estimate Appears normal     Aniso Moderate     Poik Slight     Poly Occasional     Progreso Cells Occasional     Large/Giant Platelets Present     Differential Method Automated    Protime-INR    Collection Time: 03/20/19  7:02 AM   Result Value Ref Range    Prothrombin Time 15.3 (H) 9.0 - 12.5 sec    INR 1.6 (H) 0.8 - 1.2   POCT glucose    Collection Time: 03/20/19  7:48 AM   Result Value Ref Range    POCT Glucose 124 (H) 70 - 110 mg/dL       Recent Labs   Lab 03/18/19 2132 03/19/19  0803 03/19/19  1214 03/19/19  1745 03/19/19  2259 03/20/19  0748   POCTGLUCOSE 120* 122* 221* 115* 146* 124*       Hemoglobin A1C   Date Value Ref Range Status   09/12/2018 6.3 (H) 4.0 - 5.6 % Final     Comment:     ADA Screening Guidelines:  5.7-6.4%  Consistent with prediabetes  >or=6.5%  Consistent with diabetes  High levels of fetal hemoglobin interfere with the HbA1C  assay. Heterozygous hemoglobin variants (HbS, HgC, etc)do  not significantly interfere with this assay.   However, presence of  multiple variants may affect accuracy.     11/30/2017 7.9 (H) 4.0 - 5.6 % Final     Comment:     According to ADA guidelines, hemoglobin A1c <7.0% represents  optimal control in non-pregnant diabetic patients. Different  metrics may apply to specific patient populations.   Standards of Medical Care in Diabetes-2016.  For the purpose of screening for the presence of diabetes:  <5.7%     Consistent with the absence of diabetes  5.7-6.4%  Consistent with increasing risk for diabetes   (prediabetes)  >or=6.5%  Consistent with diabetes  Currently, no consensus exists for use of hemoglobin A1c  for diagnosis of diabetes for children.  This Hemoglobin A1c assay has significant interference with fetal   hemoglobin   (HbF). The results are invalid for patients with abnormal amounts of   HbF,   including those with known Hereditary Persistence   of Fetal Hemoglobin. Heterozygous hemoglobin variants (HbAS, HbAC,   HbAD, HbAE, HbA2) do not significantly interfere with this assay;   however, presence of multiple variants in a sample may impact the %   interference.     02/22/2017 9.0 (H) 4.5 - 6.2 % Final     Comment:     According to ADA guidelines, hemoglobin A1C <7.0% represents  optimal control in non-pregnant diabetic patients.  Different  metrics may apply to specific populations.   Standards of Medical Care in Diabetes - 2016.  For the purpose of screening for the presence of diabetes:  <5.7%     Consistent with the absence of diabetes  5.7-6.4%  Consistent with increasing risk for diabetes   (prediabetes)  >or=6.5%  Consistent with diabetes  Currently no consensus exists for use of hemoglobin A1C  for diagnosis of diabetes for children.          Active Hospital Problems    Diagnosis  POA    *Hemodialysis catheter dysfunction [T82.41XA]  Yes    Pressure injury of left buttock, stage 2 [L89.322]  Yes    Delayed surgical wound healing [T81.89XA]  Yes    Chronic systolic heart failure [I50.22]  Yes    Acute gastric  ulcer with hemorrhage [K25.0]  Yes    Antibiotic-associated diarrhea [K52.1, T36.95XA]  Yes    DVT (deep venous thrombosis) [I82.409]  Unknown    Peritonitis associated with peritoneal dialysis [T85.71XA]  Yes    Hypertension [I10]  Yes    Diabetes mellitus, type 2 [E11.9]  Yes    CAD (coronary artery disease) [I25.10]  Yes     Cath 12/27/2012:   RCA PCI 2.5 x 18 and 3.0 x 26 mm BMS   Patent LAD and LCX   Normal EF     Dr. Berrios for NSTEMI       DAPT score 3         ESRD (end stage renal disease) on PD ( initiated dialysis 04/20/2004) [N18.6]  Yes     Nightly PD        Resolved Hospital Problems   No resolved problems to display.          ASSESSMENT AND PLAN:       ESRD on HD  Status post removal of PD catheter for concern regarding mutliple organsims growing in peritoneal fluid. Attempts at tunneled catheter in IJ x 2 during this admission. Left femoral trialysis placed.   - Nephrology following  - Currently have left femoral trialysis catheter  - Plan for OR today per vascular for permanent cath   - NPO currently    Peritonitis due to peritoneal dialysis catheter   - Plan is for cefepime until March 28th, renal dosing  - Wound care consulted for PD site wound care    Antibiotic-associated diarrhea  - not consistent with C diff.  Has been C diff negative.    - barrier creams and antidiarrheals ordered     CAD  - remote stents. Was on DAPT, continuing on Asa (due to bleed plavix held)  - continue BB    Acute gastric ulcer with hemorrhage  GIB  - no more bleeding, on PPI  - waiting for H.pylori testing results    Pressure injury stage 2 on left buttock   - consulted wound care    Chronic systolic heart failure  - EF 30% on TTE, stable  - Continue BB and add GDMT as able    Diabetes mellitus, type 2  - A1c 6.3, was getting intra-peritoneal insulin. Catheter now removed.   - SSI for now       Prophylaxis- SCDs for now for surgery today    Code Status- Full Code     Discharge plan and follow up - d/c home  once medically stable, need HD chair    Guevara Metzger MD  Hospital Medicine Staff  Pager [phone]

## 2019-03-20 NOTE — PROGRESS NOTES
VASCULAR STAFF    Central venous duplex suggests signif R subclavian/innominate stenosis/thrombus.     Need to further investigate this prior to placing a R AVG    P/ R arm and central venogram today, possible intervention, possible permacath    ZENY Wynn III, MD, FACS  Professor and Chief, Vascular and Endovascular Surgery

## 2019-03-20 NOTE — PLAN OF CARE
Problem: Skin Injury Risk Increased  Goal: Skin Health and Integrity  Outcome: Ongoing (interventions implemented as appropriate)  Stage 2 buttock ulcer, appears to be somewhat better, continue to use the triad cream prn per wound care nurse.

## 2019-03-20 NOTE — PLAN OF CARE
SW received a call back from [facility] ()[phone] that [facility] will not have a chair for the pt now. [facility]; [phone] ([facility]) has M,W,F  At 3:30p.     Julieta Barragan LCSW  [id number]     03/20/19 1512   Post-Acute Status   Post-Acute Authorization Dialysis  ([facility] )   Diaylsis Status Awaiting Internal Medical Clearance

## 2019-03-20 NOTE — BRIEF OP NOTE
[facility]  Brief Operative Note    SUMMARY     Surgery Date: 3/20/2019     Surgeon(s) and Role:     * BESSIE Wynn III, MD - Primary     * Wilfrid Walker MD    Assisting Surgeon: None    Pre-op Diagnosis:  R central venous stenosis    Post-op Diagnosis: No R central venous stenosis    PROCEDURE:  R arm ascending venogram, R subclavian/innominate venogram    Anesthesia: RN IV Sedation    Description of Procedure: No central venous stenosis    Description of the findings of the procedure: as above    Estimated Blood Loss: <2cc         Specimens:   Specimen (12h ago, onward)    None

## 2019-03-20 NOTE — PROGRESS NOTES
POST OP NOTE:    Venogram (performed in the cath lab) shows no R central venous stenosis.  No OR availability today because of emergency cases.  No OR availability tomorrow    P/  R upper arm ACCUSEAL (Early access) AVG placement Fri 3/22/19    No IVs in R arm  Please do HD tomorrow (Thursday)    ZENY Wynn III, MD, FACS  Professor and Chief, Vascular and Endovascular Surgery

## 2019-03-20 NOTE — PROGRESS NOTES
HD unable to be initiated via left femoral trialysis. Issues noted with femoral line during HD Monday night. Patient scheduled for Accuseal graft placement today in OR. Patient denies complaints of distress, currently lying flat comfortably, on room air. Electrolytes and acid base stable, continue sodium bicarbonate.     Will plan for dialysis on tomorrow for metabolic clearance and volume management via graft.     GUERRERO Beasley DNP, APRN, FNP-C  Department of Nephrology  [facility]  Pager: [phone]

## 2019-03-20 NOTE — PLAN OF CARE
CM to the Bedside to see the patient. Family present at the time of visit. The patient was notified that the CM team continues to follow throughout this hospital stay for D/C needs and/or recommendations. D/C plan at this time is Home with Home Health. The patient would like [facility] as her first choice. She states she has used them in the past and was happy with their services. New HD chair placement is to be arranged as well prior to D/C. CM name and number remain on the board at the Bedside. The patient was instructed to call with any D/C needs or questions. Understanding verbalized.

## 2019-03-20 NOTE — PLAN OF CARE
Problem: Device-Related Complication Risk (Hemodialysis)  Goal: Safe, Effective Therapy Delivery    Intervention: Optimize Device Care and Function  Patient alert and orientated , follows commands , bed in low position , call light within reach and patient demonstrated proper usage. Bed locked with brake , Room clutter free and personal items with reach, addressed care plan for today , all questions answered and allow patient time for clarification. Medications and diet fluid balance  instructions with signs and symptoms and the importance to continue once discharged .Reviewed discharged , next  Follow up appointment.

## 2019-03-20 NOTE — NURSING
Patient alert and orientated , follows commands , bed in low position , call light within reach and patient demonstrated proper usage. Bed locked with brake , Room clutter free and personal items with reach, addressed care plan for today , all questions answered and allow patient time for clarification. Medications and diet fluid balance  instructions with signs and symptoms and the importance to continue once discharged .Reviewed discharged , next  Follow up appointment.

## 2019-03-20 NOTE — PROGRESS NOTES
"Attempted dialysis to left femoral trialysis.  Unable to run due to venous port does not aspirate and arterial port "sucking down".  Chelsie Beasley NP at the bedside and aware.  Will dialyze when receives working access.  "

## 2019-03-21 NOTE — PLAN OF CARE
Problem: Fall Injury Risk  Goal: Absence of Fall and Fall-Related Injury  Outcome: Ongoing (interventions implemented as appropriate)  Fall precaution maintained this shift call bell in reach bed in low position. B/p low last night 250 bolus given. Pt had multiple loose stool today

## 2019-03-21 NOTE — PROGRESS NOTES
[facility] H[facility]IS NOTE     Patient currently on hemodialysis for removal of uremic toxins and volume.     Patient seen and evaluated on hemodialysis, tolerating treatment, see HD flowsheet for vitals and assessments.    No chest pain, shortness of breath, cramping, nausea or vomiting.       Ultrafiltration goal is 1-2 L as tolerated, keep MAP >65     Labs have been reviewed and the dialysate bath has been adjusted.     Assessment/Plan:  -Seen on dialysis this morning, patient hypotensive 1 hr into treatment, UFR adjusted.   -Patient hypotensive overnight, IVF bolus given with some improvement   -Cathflo instilled 2 hrs prior to initiation of dialysis treatments. Patient continues to have poor flows likely due to issues with temporary line.   -Patient initially scheduled for SESAR Accuseal AVG placement on yesterday, but will now have procedure tomorrow, 3/22  -Underwent venogram on yesterday w/ no R central venous stenosis per Vascular's note.   -Will continue dialysis treatments while in-patient    Anemia of ESRD  Hgb 11.2, within range     BMM  Renal diet  Novasource with meals  Phos 5.3, on binders   Daily renal function panel       Chelsie Beasley, URSULA, APRN, FNP-C  Nephrology Department  Pager:  [phone]

## 2019-03-21 NOTE — PLAN OF CARE
CM to the Bedside to see the patient. She is RYAN in HD at this time. CM team continues to follow the patient throughout this hospital stay for needs. CM name and number remains on the board at the Bedside for the patient to call with any D/C needs or questions.

## 2019-03-21 NOTE — PT/OT/SLP PROGRESS
Physical Therapy      Patient Name:  Jenni Todd   MRN:  [id number]    Patient not seen today secondary to Dialysis(Pt away off floor upon attempt in AM. Writing therapist unable to make further attempts this day. ). Will follow-up per POC.    Carlos Germain, PTA

## 2019-03-21 NOTE — CARE UPDATE
RN Proactive Rounding Note  Time of Visit: 0900    Admit Date: 3/18/2019  LOS: 3  Code Status: Full Code   Date of Visit: 2019  : 1970  Age: 48 y.o.  Sex: female  Race: Black or   [facility] A:   MRN: [id number]  Was the patient discharged from an ICU this admission? no   Was the patient discharged from a PACU within last 24 hours?  no  Did the patient receive conscious sedation/general anesthesia in last 24 hours?  no  Was the patient in the ED within the past 24 hours?  no  Was the patient started on NIPPV within the past 24 hours?  no  Attending Physician: Guevara Metzger MD  Primary Service: [facility] A      ASSESSMENT:     Abnormal Vital Signs:  Clinical Issues: Circulatory     INTERVENTIONS/ RECOMMENDATIONS:     KS performed for hypotension pt had over night. VSS at this time. NAD noted. Pt and primary RN deny needs/concerns at this time. Pt to receive dialysis today.     Monitor VS and notify primary team of changes in pt's condition.    Discussed plan of care with RNCorey, [id number].    PHYSICIAN ESCALATION:     Yes/No  no    Orders received and case discussed with Dr. tejada/henry .    Disposition: Remain in room 1153 A.    FOLLOW-UP/CONTINGENCY:     Call back the Rapid Response Nurse at [phone] for additional questions or concerns.

## 2019-03-21 NOTE — PROGRESS NOTES
Chronic HD treatment started. Catheter to left femoral aspirated and flushed well after TPA was removed. Flow rate at 200. No complaints of discomfort at this time.

## 2019-03-21 NOTE — PROGRESS NOTES
[facility]  Vascular Surgery  Progress Note    Patient Name: Jenni Todd  MRN: [id number]  Admission Date: 3/18/2019  Primary Care Provider: Michael Tan Iii, MD    Subjective:     Interval History: Underwent venogram with no R central venous stenosis discovered. Hypotensive overnight, bolused 250 cc of NS. Troponins trended down. No complaints.    Post-Op Info:  Procedure(s) (LRB):  Venogram, possible intervention (Right)   1 Day Post-Op       Medications:  Continuous Infusions:   heparin (porcine) 1,000 Units/hr (03/18/19 1016)     Scheduled Meds:   sodium chloride 0.9%   Intravenous Once    sodium chloride 0.9%   Intravenous Once    sodium chloride 0.9%   Intravenous Once    ceFEPime (MAXIPIME) IVPB  1 g Intravenous Daily    pantoprazole  40 mg Oral Daily    sevelamer carbonate  800 mg Oral TID WM    sodium bicarbonate  650 mg Oral BID    tuberculin  5 Units Intradermal Once     PRN Meds:acetaminophen, dextrose 50%, dextrose 50%, glucagon (human recombinant), glucose, glucose, heparin (porcine), insulin aspart U-100, loperamide, ondansetron, sodium chloride 0.9%     Objective:     Vital Signs (Most Recent):  Temp: 98 °F (36.7 °C) (03/21/19 0755)  Pulse: 82 (03/21/19 0755)  Resp: 18 (03/21/19 0755)  BP: 103/74 (03/21/19 0755)  SpO2: 99 % (03/21/19 0755) Vital Signs (24h Range):  Temp:  [98 °F (36.7 °C)] 98 °F (36.7 °C)  Pulse:  [] 82  Resp:  [15-25] 18  SpO2:  [98 %-100 %] 99 %  BP: ()/(44-77) 103/74          Physical Exam   Constitutional: She is oriented to person, place, and time. She appears well-developed and well-nourished. No distress.   Eyes: No scleral icterus.   Pulmonary/Chest: Effort normal. No respiratory distress.   Abdominal: Soft. She exhibits no distension.   Neurological: She is alert and oriented to person, place, and time.   Skin: Skin is warm and dry.   Psychiatric: She has a normal mood and affect. Her behavior is normal. Judgment and thought  content normal.   Vitals reviewed.      Significant Labs:  BMP:   Recent Labs   Lab 03/21/19  0723   *      K 4.3      CO2 18*   BUN 34*   CREATININE 8.9*   CALCIUM 8.4*   MG 1.9     CBC:   Recent Labs   Lab 03/21/19  0723   WBC 9.88   RBC 3.69*   HGB 11.2*   HCT 34.5*   *   MCV 94   MCH 30.4   MCHC 32.5       Significant Diagnostics:  I have reviewed all pertinent imaging results/findings within the past 24 hours.    Assessment/Plan:     ESRD (end stage renal disease) on PD ( initiated dialysis 04/20/2004)    -To OR tomorrow (3/22) for Right arm ACCUSEAL (Early access) AVG placement  -Will sign consent today  -No IVs in R arm  -Please do HD today (Thurs, 3/21)             Heraclio Grant MD  Vascular Surgery  [facility]

## 2019-03-21 NOTE — PROGRESS NOTES
HD treatment complete. Duration of treatment 3 hours and 15 minutes. 370 cc removed. System clotted after 1 hour of treatment. System was restarted. Heparin given at start of restarting system. Flow rate still 200 and lines reversed. Treatment completed catheter flushed with NS and locked with NS. Capped and taped.

## 2019-03-21 NOTE — PROGRESS NOTES
Cathflo instilled to both lumens of left femoral dialysis catheter.  Medication to be removed per dialysis nurse.  Tolerated well.

## 2019-03-21 NOTE — SIGNIFICANT EVENT
Significant Event  Hospital Medicine    CC:  Hemodialysis catheter dysfunction  Date:  03/20/2019  Admit Date:  3/18/2019  Hospital Length of Stay:  2    Code Status:  Full Code     SUBJECTIVE:     Significant Events:  Called urgently to the bedside by nurse to evaluate patient for hypotension to SBP 72 with associated weakness.  Pt denies poor intake.  She has been feeling well s/p conscious sedation from angiogram earlier today.  She denies new melena. She denies dizziness, SOB, CP, palpitations, f/c, nausea. Continues with diarrhea.        OBJECTIVE:     Physical Exam:  Last Vitals:   Vitals:    03/20/19 2047   BP: (!) 72/50   Pulse: 100   Resp: (!) 25   Temp: 98 °F (36.7 °C)     GA:  Lethargic on exam, but talkative.  HEENT:  PERRL.  No scleral icterus or JVD.   Pulmonary:  Clear to auscultation A/P/L.  No wheezing, crackles, or rhonchi.  Cardiac:  Diminished heart sounds (obese)  Abdominal:  Diminished Bowel sounds; normal BS to RUQ.  No appreciable hepatosplenomegaly. Distended obese abdomen  Neuro:  --Mental Status:  AAOx4  --CN II-XII grossly intact.  Corneal reflex, gag, cough intact.  --Extremity strength and sensation intact x 4.     ASSESSMENT AND PLAN/INTERVENTIONS:     1) Hypotension  Plan/Interventions:  - Septic workup including CXR, LA, BCx, CBC.   - Possible blood loss: checking INR, type and screen (on protonix)  - given 250 cc with improvement to .  Reports subjective improvement s/p 250 cc bolus  - MICU evaluated patient at the bedside as patient high risk for decompensation (recent GIB, current tx for peritonitis) in setting of missed dialysis today with absence of catheter access for dialysis (failed BL IJ catheter attempts; L femoral failed; AVG to be placed Friday).    - Will await labs for now.  Will need central line/tunneled cath placed for dialysis tomorrow.    Uninterrupted Critical Care/Counseling Time (not including procedures):  35    Min Peralta PA-C  [facility] Medicine  Department  Staff:   Hedy Ontiveros MD

## 2019-03-21 NOTE — CARE UPDATE
RN Proactive Rounding Note  Time of Visit:     Admit Date: 3/18/2019  LOS: 2  Code Status: Full Code   Date of Visit: 2019  : 1970  Age: 48 y.o.  Sex: female  Race: Black or   Bed: [facility] A:   MRN: [id number]  Was the patient discharged from an ICU this admission? yes   Was the patient discharged from a PACU within last 24 hours?  yes  Did the patient receive conscious sedation/general anesthesia in last 24 hours?  yes  Was the patient in the ED within the past 24 hours?  no  Was the patient started on NIPPV within the past 24 hours?  no  Attending Physician: Guevara Metzger MD  Primary Service: [facility] HOSP MED A      ASSESSMENT:     Abnormal Vital Signs:  Clinical Issues: Circulatory     INTERVENTIONS/ RECOMMENDATIONS:    Called regarding patients new onset hypotension. Patient a peritoneal dialysis patient with recent peritonitis. Vascular consulted for HD access. Went to cath lab today to obtain HD access which was unsuccessful. BP 72/50 and patient currently receiving 250ml NS bolus. Labs and CXR ordered and pending. CCS notified of patient and concerns for hypotension in setting of CKD vs hypovolemia.    Discussed plan of care with RNRamu.    PHYSICIAN ESCALATION:     Yes/No  yes    Orders received and case discussed with Dr. Stevenson    Disposition: Remain in room 1153.    FOLLOW-UP/CONTINGENCY:   Followup on Labs performed. CCS notified and patient currently normotensive. No further orders at this time. Will continue to follow patient as low threshold for transfer given renal function and impending need for HD with no access.    Call back the Rapid Response Nurse at x [phone] for additional questions or concerns.

## 2019-03-21 NOTE — SUBJECTIVE & OBJECTIVE
Medications:  Continuous Infusions:   heparin (porcine) 1,000 Units/hr (03/18/19 1016)     Scheduled Meds:   sodium chloride 0.9%   Intravenous Once    sodium chloride 0.9%   Intravenous Once    sodium chloride 0.9%   Intravenous Once    ceFEPime (MAXIPIME) IVPB  1 g Intravenous Daily    pantoprazole  40 mg Oral Daily    sevelamer carbonate  800 mg Oral TID WM    sodium bicarbonate  650 mg Oral BID    tuberculin  5 Units Intradermal Once     PRN Meds:acetaminophen, dextrose 50%, dextrose 50%, glucagon (human recombinant), glucose, glucose, heparin (porcine), insulin aspart U-100, loperamide, ondansetron, sodium chloride 0.9%     Objective:     Vital Signs (Most Recent):  Temp: 98 °F (36.7 °C) (03/21/19 0755)  Pulse: 82 (03/21/19 0755)  Resp: 18 (03/21/19 0755)  BP: 103/74 (03/21/19 0755)  SpO2: 99 % (03/21/19 0755) Vital Signs (24h Range):  Temp:  [98 °F (36.7 °C)] 98 °F (36.7 °C)  Pulse:  [] 82  Resp:  [15-25] 18  SpO2:  [98 %-100 %] 99 %  BP: ()/(44-77) 103/74          Physical Exam   Constitutional: She is oriented to person, place, and time. She appears well-developed and well-nourished. No distress.   Eyes: No scleral icterus.   Pulmonary/Chest: Effort normal. No respiratory distress.   Abdominal: Soft. She exhibits no distension.   Neurological: She is alert and oriented to person, place, and time.   Skin: Skin is warm and dry.   Psychiatric: She has a normal mood and affect. Her behavior is normal. Judgment and thought content normal.   Vitals reviewed.      Significant Labs:  BMP:   Recent Labs   Lab 03/21/19  0723   *      K 4.3      CO2 18*   BUN 34*   CREATININE 8.9*   CALCIUM 8.4*   MG 1.9     CBC:   Recent Labs   Lab 03/21/19  0723   WBC 9.88   RBC 3.69*   HGB 11.2*   HCT 34.5*   *   MCV 94   MCH 30.4   MCHC 32.5       Significant Diagnostics:  I have reviewed all pertinent imaging results/findings within the past 24 hours.

## 2019-03-21 NOTE — PROGRESS NOTES
[facility] Hospital Medicine                                                                     Progress Note     Team: [facility] HOSP MED A Guevara Metzger MD   Admit Date: 3/18/2019   Hospital Day: 3  JESSY:  3/21/2018  Code status: Full Code   Principal Problem: Hemodialysis catheter dysfunction       SUMMARY:     Jenni Todd is a 48 y.o. female w/ CAD s/p 2 PCI (2012), HFrEF 30%, HTN, DM, and ESRD on home PD admitted to [facility] on 2/21 for peritonitis. PD catheter removed. Due to hx of recurrent peritonitis she was transitioned to HD. Bilateral IJ tunneled catheters were attempted without success. Now has a temporary dialysis catheter in her left fem. Transferred to [facility] for vascular. Nephrology access team attempted a tunneled line and were unsuccessful.  Vascular surgery consulted for HD access. Hospital course complicated by GIB with low risk ulcer. Awaiting permanent cath, planned for 3/22.     SUBJECTIVE:     Pt was seen and examined at bedside. Vascular surgery postponed jennifer cath to 3/22. Noted multiple episodes of hypotension overnight, responded well to small bolus of fluids. Non toxic appearing. Afebrile. Trop flat. LA wnl. BNP mildly elevated but no dyspnea. This AM doing well, mild hypotension, asymptomatic. Continues to have diarrhea, C diff was neg recently, will schedule loperamide.       ROS (Positive in Bold, otherwise negative)  Pain Scale: 0 /10   Constitutional: fever, chills, night sweats  CV: chest pain, edema, palpitations  Resp: SOB, cough, sputum production  GI: changes in appetite, NVDC, pain, melena, hematochezia, GERD, hematemesis  : Dysuria, hematuria, urinary urgency, frequency  MSK: arthralgia/myalgia, joint swelling  SKIN: rashes, pruritis, petechiae   Neuro/Psych: FND, anxiety, depression      OBJECTIVE:     Vitals:  Temp:   [98 °F (36.7 °C)]   Pulse:  []   Resp:  [15-25]   BP: ()/(44-77)   SpO2:  [98 %-100 %]      I & O (Last 24H):     Intake/Output Summary (Last 24 hours) at 3/21/2019 1046  Last data filed at 3/20/2019 2105  Gross per 24 hour   Intake 250 ml   Output --   Net 250 ml         GEN: in no acute distress. Nontoxic. Resting in bed. Cooperative.  HEENT: NCAT. PERRL. EOMI. Conjunctivae/corneas clear, sclera Anicteric.  CVS: RRR. Normal s1 s2 no murmur, click, rub or gallop  LUNG: CTAB. Normal respiratory effort. No wheezes, rhonchi, or crackles.  ABD: PD site noted. Normoactive BS, soft, NT, ND, no masses or organomegaly.  EXT: No edema. No cyanosis. Full ROM.  SKIN: color, texture, turgor normal. No rashes or lesions  NEURO: Alert, oriented x 4, Spont mvt of all extremities with no focal deficits noted.      All recent labs and imaging has been reviewed.     Recent Results (from the past 24 hour(s))   POCT glucose    Collection Time: 03/20/19 11:52 AM   Result Value Ref Range    POCT Glucose 123 (H) 70 - 110 mg/dL   POCT glucose    Collection Time: 03/20/19  5:03 PM   Result Value Ref Range    POCT Glucose 122 (H) 70 - 110 mg/dL   POCT glucose    Collection Time: 03/20/19  8:51 PM   Result Value Ref Range    POCT Glucose 132 (H) 70 - 110 mg/dL   Protime-INR    Collection Time: 03/20/19  9:11 PM   Result Value Ref Range    Prothrombin Time 14.6 (H) 9.0 - 12.5 sec    INR 1.5 (H) 0.8 - 1.2   Blood culture    Collection Time: 03/20/19  9:11 PM   Result Value Ref Range    Blood Culture, Routine No Growth to date    CBC auto differential    Collection Time: 03/20/19  9:24 PM   Result Value Ref Range    WBC 9.68 3.90 - 12.70 K/uL    RBC 3.69 (L) 4.00 - 5.40 M/uL    Hemoglobin 11.1 (L) 12.0 - 16.0 g/dL    Hematocrit 36.2 (L) 37.0 - 48.5 %    MCV 98 82 - 98 fL    MCH 30.1 27.0 - 31.0 pg    MCHC 30.7 (L) 32.0 - 36.0 g/dL    RDW 19.5 (H) 11.5 - 14.5 %    Platelets 425 (H) 150 - 350 K/uL    MPV 10.0 9.2 - 12.9 fL     Immature Granulocytes 0.7 (H) 0.0 - 0.5 %    Gran # (ANC) 6.2 1.8 - 7.7 K/uL    Immature Grans (Abs) 0.07 (H) 0.00 - 0.04 K/uL    Lymph # 2.6 1.0 - 4.8 K/uL    Mono # 0.8 0.3 - 1.0 K/uL    Eos # 0.1 0.0 - 0.5 K/uL    Baso # 0.07 0.00 - 0.20 K/uL    nRBC 3 (A) 0 /100 WBC    Gran% 63.7 38.0 - 73.0 %    Lymph% 26.3 18.0 - 48.0 %    Mono% 7.7 4.0 - 15.0 %    Eosinophil% 0.9 0.0 - 8.0 %    Basophil% 0.7 0.0 - 1.9 %    Differential Method Automated    Comprehensive metabolic panel    Collection Time: 03/20/19  9:24 PM   Result Value Ref Range    Sodium 137 136 - 145 mmol/L    Potassium 4.4 3.5 - 5.1 mmol/L    Chloride 105 95 - 110 mmol/L    CO2 19 (L) 23 - 29 mmol/L    Glucose 158 (H) 70 - 110 mg/dL    BUN, Bld 31 (H) 6 - 20 mg/dL    Creatinine 8.6 (H) 0.5 - 1.4 mg/dL    Calcium 8.4 (L) 8.7 - 10.5 mg/dL    Total Protein 6.8 6.0 - 8.4 g/dL    Albumin 1.8 (L) 3.5 - 5.2 g/dL    Total Bilirubin 0.2 0.1 - 1.0 mg/dL    Alkaline Phosphatase 100 55 - 135 U/L    AST 19 10 - 40 U/L    ALT 8 (L) 10 - 44 U/L    Anion Gap 13 8 - 16 mmol/L    eGFR if African American 5.7 (A) >60 mL/min/1.73 m^2    eGFR if non African American 4.9 (A) >60 mL/min/1.73 m^2   Lactic acid, plasma    Collection Time: 03/20/19  9:24 PM   Result Value Ref Range    Lactate (Lactic Acid) 1.5 0.5 - 2.2 mmol/L   Troponin I    Collection Time: 03/20/19  9:24 PM   Result Value Ref Range    Troponin I 0.316 (H) 0.000 - 0.026 ng/mL   Brain natriuretic peptide    Collection Time: 03/20/19  9:24 PM   Result Value Ref Range     (H) 0 - 99 pg/mL   Type & Screen    Collection Time: 03/20/19  9:24 PM   Result Value Ref Range    Group & Rh B POS     Indirect Mary NEG    Blood culture    Collection Time: 03/20/19  9:24 PM   Result Value Ref Range    Blood Culture, Routine No Growth to date    Troponin I    Collection Time: 03/21/19  1:00 AM   Result Value Ref Range    Troponin I 0.210 (H) 0.000 - 0.026 ng/mL   Comprehensive Metabolic Panel (CMP)    Collection Time:  03/21/19  7:23 AM   Result Value Ref Range    Sodium 137 136 - 145 mmol/L    Potassium 4.3 3.5 - 5.1 mmol/L    Chloride 107 95 - 110 mmol/L    CO2 18 (L) 23 - 29 mmol/L    Glucose 143 (H) 70 - 110 mg/dL    BUN, Bld 34 (H) 6 - 20 mg/dL    Creatinine 8.9 (H) 0.5 - 1.4 mg/dL    Calcium 8.4 (L) 8.7 - 10.5 mg/dL    Total Protein 6.7 6.0 - 8.4 g/dL    Albumin 1.8 (L) 3.5 - 5.2 g/dL    Total Bilirubin 0.2 0.1 - 1.0 mg/dL    Alkaline Phosphatase 108 55 - 135 U/L    AST 15 10 - 40 U/L    ALT 8 (L) 10 - 44 U/L    Anion Gap 12 8 - 16 mmol/L    eGFR if African American 5.4 (A) >60 mL/min/1.73 m^2    eGFR if non  4.7 (A) >60 mL/min/1.73 m^2   Magnesium    Collection Time: 03/21/19  7:23 AM   Result Value Ref Range    Magnesium 1.9 1.6 - 2.6 mg/dL   Phosphorus    Collection Time: 03/21/19  7:23 AM   Result Value Ref Range    Phosphorus 5.3 (H) 2.7 - 4.5 mg/dL   CBC with Automated Differential    Collection Time: 03/21/19  7:23 AM   Result Value Ref Range    WBC 9.88 3.90 - 12.70 K/uL    RBC 3.69 (L) 4.00 - 5.40 M/uL    Hemoglobin 11.2 (L) 12.0 - 16.0 g/dL    Hematocrit 34.5 (L) 37.0 - 48.5 %    MCV 94 82 - 98 fL    MCH 30.4 27.0 - 31.0 pg    MCHC 32.5 32.0 - 36.0 g/dL    RDW 19.2 (H) 11.5 - 14.5 %    Platelets 406 (H) 150 - 350 K/uL    MPV 9.5 9.2 - 12.9 fL    Immature Granulocytes 0.9 (H) 0.0 - 0.5 %    Gran # (ANC) 6.2 1.8 - 7.7 K/uL    Immature Grans (Abs) 0.09 (H) 0.00 - 0.04 K/uL    Lymph # 2.7 1.0 - 4.8 K/uL    Mono # 0.7 0.3 - 1.0 K/uL    Eos # 0.1 0.0 - 0.5 K/uL    Baso # 0.04 0.00 - 0.20 K/uL    nRBC 2 (A) 0 /100 WBC    Gran% 62.6 38.0 - 73.0 %    Lymph% 27.5 18.0 - 48.0 %    Mono% 7.5 4.0 - 15.0 %    Eosinophil% 1.1 0.0 - 8.0 %    Basophil% 0.4 0.0 - 1.9 %    Differential Method Automated    Protime-INR    Collection Time: 03/21/19  7:23 AM   Result Value Ref Range    Prothrombin Time 13.8 (H) 9.0 - 12.5 sec    INR 1.4 (H) 0.8 - 1.2       Recent Labs   Lab 03/19/19  1745 03/19/19  2257 03/20/19  0720  03/20/19  1152 03/20/19  1703 03/20/19  2051   POCTGLUCOSE 115* 146* 124* 123* 122* 132*       Hemoglobin A1C   Date Value Ref Range Status   09/12/2018 6.3 (H) 4.0 - 5.6 % Final     Comment:     ADA Screening Guidelines:  5.7-6.4%  Consistent with prediabetes  >or=6.5%  Consistent with diabetes  High levels of fetal hemoglobin interfere with the HbA1C  assay. Heterozygous hemoglobin variants (HbS, HgC, etc)do  not significantly interfere with this assay.   However, presence of multiple variants may affect accuracy.     11/30/2017 7.9 (H) 4.0 - 5.6 % Final     Comment:     According to ADA guidelines, hemoglobin A1c <7.0% represents  optimal control in non-pregnant diabetic patients. Different  metrics may apply to specific patient populations.   Standards of Medical Care in Diabetes-2016.  For the purpose of screening for the presence of diabetes:  <5.7%     Consistent with the absence of diabetes  5.7-6.4%  Consistent with increasing risk for diabetes   (prediabetes)  >or=6.5%  Consistent with diabetes  Currently, no consensus exists for use of hemoglobin A1c  for diagnosis of diabetes for children.  This Hemoglobin A1c assay has significant interference with fetal   hemoglobin   (HbF). The results are invalid for patients with abnormal amounts of   HbF,   including those with known Hereditary Persistence   of Fetal Hemoglobin. Heterozygous hemoglobin variants (HbAS, HbAC,   HbAD, HbAE, HbA2) do not significantly interfere with this assay;   however, presence of multiple variants in a sample may impact the %   interference.     02/22/2017 9.0 (H) 4.5 - 6.2 % Final     Comment:     According to ADA guidelines, hemoglobin A1C <7.0% represents  optimal control in non-pregnant diabetic patients.  Different  metrics may apply to specific populations.   Standards of Medical Care in Diabetes - 2016.  For the purpose of screening for the presence of diabetes:  <5.7%     Consistent with the absence of diabetes  5.7-6.4%   Consistent with increasing risk for diabetes   (prediabetes)  >or=6.5%  Consistent with diabetes  Currently no consensus exists for use of hemoglobin A1C  for diagnosis of diabetes for children.          Active Hospital Problems    Diagnosis  POA    *Hemodialysis catheter dysfunction [T82.41XA]  Yes    Pressure injury of left buttock, stage 2 [L89.322]  Yes    Delayed surgical wound healing [T81.89XA]  Yes    Chronic systolic heart failure [I50.22]  Yes    Acute gastric ulcer with hemorrhage [K25.0]  Yes    Antibiotic-associated diarrhea [K52.1, T36.95XA]  Yes    DVT (deep venous thrombosis) [I82.409]  Unknown    Peritonitis associated with peritoneal dialysis [T85.71XA]  Yes    Hypertension [I10]  Yes    Diabetes mellitus, type 2 [E11.9]  Yes    CAD (coronary artery disease) [I25.10]  Yes     Cath 12/27/2012:   RCA PCI 2.5 x 18 and 3.0 x 26 mm BMS   Patent LAD and LCX   Normal EF     Dr. Berrios for NSTEMI       DAPT score 3         ESRD (end stage renal disease) on PD ( initiated dialysis 04/20/2004) [N18.6]  Yes     Nightly PD        Resolved Hospital Problems   No resolved problems to display.          ASSESSMENT AND PLAN:     ESRD on HD  Status post removal of PD catheter for concern regarding mutliple organsims growing in peritoneal fluid. Attempts at tunneled catheter in IJ x 2 during this admission. Left femoral trialysis placed.   - Nephrology following  - Currently have left femoral trialysis catheter - non functional yesterday  - No IVs in R arm  - Plan for OR 3/22 per vascular for permanent cath   - NPO tn    Hypotension  Antibiotic-associated diarrhea  - Multiple episodes of hypotension overnight, suspecting from recurrent diarrhea at this time  - Fluids prn and loperamide, continue to monitor   - Not on any antihypertensives at this time and HH stable  - not consistent with C diff.  Has been C diff negative.    - barrier creams and antidiarrheals ordered    Peritonitis due to peritoneal  dialysis catheter   - Plan is for cefepime until March 28th, renal dosing  - Wound care consulted for PD site wound care    CAD  - remote stents. Was on DAPT, continuing on Asa (due to bleed plavix held)  - holding BB    Acute gastric ulcer with hemorrhage  GIB  - no more bleeding, on PPI  - biopsy neg for malignancy or H.pylori    Pressure injury stage 2 on left buttock   - consulted wound care    Chronic systolic heart failure  - EF 30% on TTE, stable  - Hold antihypertensive due to hypotension    Diabetes mellitus, type 2  - A1c 6.3, was getting intra-peritoneal insulin. Catheter now removed.   - SSI for now       Prophylaxis- SCDs for now    Code Status- Full Code     Discharge plan and follow up - d/c home with HH once medically stable, need HD chair    Guevara Metzger MD  Hospital Medicine Staff  Pager [phone]

## 2019-03-22 NOTE — PROGRESS NOTES
[facility] Hospital Medicine                                                                     Progress Note     Team: [facility] HOSP MED A Guevara Metzger MD   Admit Date: 3/18/2019   Hospital Day: 4  JESSY:  3/24/2018  Code status: Full Code   Principal Problem: Hemodialysis catheter dysfunction     SUMMARY:     Jenni Todd is a 48 y.o. female w/ CAD s/p 2 PCI (2012), HFrEF 30%, HTN, DM, and ESRD on home PD admitted to [facility] on 2/21 for peritonitis. PD catheter removed. Due to hx of recurrent peritonitis she was transitioned to HD. Bilateral IJ tunneled catheters were attempted without success. Now has a temporary dialysis catheter in her left fem. Transferred to [facility] for vascular. Nephrology access team attempted a tunneled line and were unsuccessful.  Vascular surgery consulted for HD access. Hospital course complicated by GIB with low risk ulcer. Awaiting permanent cath, planned for 3/22.     SUBJECTIVE:     Pt was seen and examined at bedside. Episodic hypotension again noted yesterday and overnight, asymptomatic, responded well to 250 cc fluid bolus. Non toxic appearing. Afebrile. NPO for procedure today.       ROS (Positive in Bold, otherwise negative)  Pain Scale: 0 /10   Constitutional: fever, chills, night sweats  CV: chest pain, edema, palpitations  Resp: SOB, cough, sputum production  GI: changes in appetite, NVDC, pain, melena, hematochezia, GERD, hematemesis  : Dysuria, hematuria, urinary urgency, frequency  MSK: arthralgia/myalgia, joint swelling  SKIN: rashes, pruritis, petechiae   Neuro/Psych: FND, anxiety, depression      OBJECTIVE:     Vitals:  Temp:  [97 °F (36.1 °C)-99 °F (37.2 °C)]   Pulse:  [75-93]   Resp:  [16-20]   BP: ()/(42-72)   SpO2:  [96 %-100 %]      I & O (Last 24H):     Intake/Output Summary (Last 24 hours) at  3/22/2019 1127  Last data filed at 3/21/2019 1719  Gross per 24 hour   Intake 790 ml   Output 920 ml   Net -130 ml       GEN: in no acute distress. Nontoxic. Resting in bed. Cooperative.  HEENT: NCAT. PERRL. EOMI. Conjunctivae/corneas clear, sclera Anicteric.  CVS: RRR. Normal s1 s2 no murmur, click, rub or gallop  LUNG: CTAB. Normal respiratory effort. No wheezes, rhonchi, or crackles.  ABD: PD site noted. Normoactive BS, soft, NT, ND, no masses or organomegaly.  EXT: No edema. No cyanosis. Full ROM.  SKIN: color, texture, turgor normal. No rashes or lesions  NEURO: Alert, oriented x 4, Spont mvt of all extremities with no focal deficits noted.      All recent labs and imaging has been reviewed.     Recent Results (from the past 24 hour(s))   POCT glucose    Collection Time: 03/21/19  2:48 PM   Result Value Ref Range    POCT Glucose 112 (H) 70 - 110 mg/dL   POCT glucose    Collection Time: 03/21/19  8:30 PM   Result Value Ref Range    POCT Glucose 131 (H) 70 - 110 mg/dL   Comprehensive Metabolic Panel (CMP)    Collection Time: 03/22/19  3:11 AM   Result Value Ref Range    Sodium 135 (L) 136 - 145 mmol/L    Potassium 4.2 3.5 - 5.1 mmol/L    Chloride 105 95 - 110 mmol/L    CO2 19 (L) 23 - 29 mmol/L    Glucose 123 (H) 70 - 110 mg/dL    BUN, Bld 23 (H) 6 - 20 mg/dL    Creatinine 7.2 (H) 0.5 - 1.4 mg/dL    Calcium 8.4 (L) 8.7 - 10.5 mg/dL    Total Protein 6.4 6.0 - 8.4 g/dL    Albumin 1.7 (L) 3.5 - 5.2 g/dL    Total Bilirubin 0.2 0.1 - 1.0 mg/dL    Alkaline Phosphatase 99 55 - 135 U/L    AST 16 10 - 40 U/L    ALT 8 (L) 10 - 44 U/L    Anion Gap 11 8 - 16 mmol/L    eGFR if African American 7.0 (A) >60 mL/min/1.73 m^2    eGFR if non  6.1 (A) >60 mL/min/1.73 m^2   Magnesium    Collection Time: 03/22/19  3:11 AM   Result Value Ref Range    Magnesium 1.7 1.6 - 2.6 mg/dL   Phosphorus    Collection Time: 03/22/19  3:11 AM   Result Value Ref Range    Phosphorus 4.4 2.7 - 4.5 mg/dL   CBC with Automated  Differential    Collection Time: 03/22/19  3:11 AM   Result Value Ref Range    WBC 7.37 3.90 - 12.70 K/uL    RBC 3.44 (L) 4.00 - 5.40 M/uL    Hemoglobin 10.7 (L) 12.0 - 16.0 g/dL    Hematocrit 32.4 (L) 37.0 - 48.5 %    MCV 94 82 - 98 fL    MCH 31.1 (H) 27.0 - 31.0 pg    MCHC 33.0 32.0 - 36.0 g/dL    RDW 19.3 (H) 11.5 - 14.5 %    Platelets 265 150 - 350 K/uL    MPV 9.8 9.2 - 12.9 fL    Immature Granulocytes 0.8 (H) 0.0 - 0.5 %    Gran # (ANC) 4.3 1.8 - 7.7 K/uL    Immature Grans (Abs) 0.06 (H) 0.00 - 0.04 K/uL    Lymph # 2.2 1.0 - 4.8 K/uL    Mono # 0.7 0.3 - 1.0 K/uL    Eos # 0.1 0.0 - 0.5 K/uL    Baso # 0.06 0.00 - 0.20 K/uL    nRBC 2 (A) 0 /100 WBC    Gran% 58.0 38.0 - 73.0 %    Lymph% 30.4 18.0 - 48.0 %    Mono% 8.8 4.0 - 15.0 %    Eosinophil% 1.2 0.0 - 8.0 %    Basophil% 0.8 0.0 - 1.9 %    Differential Method Automated    Protime-INR    Collection Time: 03/22/19  3:11 AM   Result Value Ref Range    Prothrombin Time 12.6 (H) 9.0 - 12.5 sec    INR 1.2 0.8 - 1.2   POCT glucose    Collection Time: 03/22/19  7:58 AM   Result Value Ref Range    POCT Glucose 143 (H) 70 - 110 mg/dL   POCT glucose    Collection Time: 03/22/19 10:40 AM   Result Value Ref Range    POCT Glucose 135 (H) 70 - 110 mg/dL       Recent Labs   Lab 03/20/19 2051 03/21/19  0846 03/21/19  1448 03/21/19  2030 03/22/19  0758 03/22/19  1040   POCTGLUCOSE 132* 177* 112* 131* 143* 135*       Hemoglobin A1C   Date Value Ref Range Status   09/12/2018 6.3 (H) 4.0 - 5.6 % Final     Comment:     ADA Screening Guidelines:  5.7-6.4%  Consistent with prediabetes  >or=6.5%  Consistent with diabetes  High levels of fetal hemoglobin interfere with the HbA1C  assay. Heterozygous hemoglobin variants (HbS, HgC, etc)do  not significantly interfere with this assay.   However, presence of multiple variants may affect accuracy.     11/30/2017 7.9 (H) 4.0 - 5.6 % Final     Comment:     According to ADA guidelines, hemoglobin A1c <7.0% represents  optimal control in  non-pregnant diabetic patients. Different  metrics may apply to specific patient populations.   Standards of Medical Care in Diabetes-2016.  For the purpose of screening for the presence of diabetes:  <5.7%     Consistent with the absence of diabetes  5.7-6.4%  Consistent with increasing risk for diabetes   (prediabetes)  >or=6.5%  Consistent with diabetes  Currently, no consensus exists for use of hemoglobin A1c  for diagnosis of diabetes for children.  This Hemoglobin A1c assay has significant interference with fetal   hemoglobin   (HbF). The results are invalid for patients with abnormal amounts of   HbF,   including those with known Hereditary Persistence   of Fetal Hemoglobin. Heterozygous hemoglobin variants (HbAS, HbAC,   HbAD, HbAE, HbA2) do not significantly interfere with this assay;   however, presence of multiple variants in a sample may impact the %   interference.     02/22/2017 9.0 (H) 4.5 - 6.2 % Final     Comment:     According to ADA guidelines, hemoglobin A1C <7.0% represents  optimal control in non-pregnant diabetic patients.  Different  metrics may apply to specific populations.   Standards of Medical Care in Diabetes - 2016.  For the purpose of screening for the presence of diabetes:  <5.7%     Consistent with the absence of diabetes  5.7-6.4%  Consistent with increasing risk for diabetes   (prediabetes)  >or=6.5%  Consistent with diabetes  Currently no consensus exists for use of hemoglobin A1C  for diagnosis of diabetes for children.          Active Hospital Problems    Diagnosis  POA    *Hemodialysis catheter dysfunction [T82.41XA]  Yes    Pressure injury of left buttock, stage 2 [L89.322]  Yes    Delayed surgical wound healing [T81.89XA]  Yes    Chronic systolic heart failure [I50.22]  Yes    Acute gastric ulcer with hemorrhage [K25.0]  Yes    Antibiotic-associated diarrhea [K52.1, T36.95XA]  Yes    DVT (deep venous thrombosis) [I82.409]  Unknown    Peritonitis associated with  peritoneal dialysis [T85.71XA]  Yes    Hypertension [I10]  Yes    Diabetes mellitus, type 2 [E11.9]  Yes    CAD (coronary artery disease) [I25.10]  Yes     Cath 12/27/2012:   RCA PCI 2.5 x 18 and 3.0 x 26 mm BMS   Patent LAD and LCX   Normal EF     Dr. Berrios for NSTEMI       DAPT score 3         ESRD (end stage renal disease) on PD ( initiated dialysis 04/20/2004) [N18.6]  Yes     Nightly PD        Resolved Hospital Problems   No resolved problems to display.          ASSESSMENT AND PLAN:     ESRD on HD  Status post removal of PD catheter for concern regarding mutliple organsims growing in peritoneal fluid. Attempts at tunneled catheter in IJ x 2 during this admission. Left femoral trialysis placed.   - Nephrology following  - Currently have left femoral trialysis catheter  - No IVs in R arm  - Plan for OR 3/22 per vascular for permanent cath   - NPO , renal diet after    Hypotension  Antibiotic-associated diarrhea  - Multiple episodes of hypotension overnight, suspecting from recurrent diarrhea at this time  - Fluids prn and loperamide, continue to monitor   - Not on any antihypertensives at this time and HH stable  - not consistent with C diff.  Has been C diff negative.    - barrier creams and antidiarrheals ordered    Peritonitis due to peritoneal dialysis catheter   - Plan is for cefepime until March 28th, renal dosing  - As per ID 2g cefepime once discharged after HD  - Wound care consulted for PD site wound care    CAD  - remote stents. Was on DAPT, continuing on Asa (due to bleed plavix held)  - holding BB    Acute gastric ulcer with hemorrhage  GIB  - no more bleeding, on PPI  - biopsy neg for malignancy or H.pylori    Pressure injury stage 2 on left buttock   - consulted wound care    Chronic systolic heart failure  - EF 30% on TTE, stable  - Hold antihypertensive due to hypotension    Diabetes mellitus, type 2  - A1c 6.3, was getting intra-peritoneal insulin. Catheter now removed.   - SSI for now          Prophylaxis- SCDs for now    Code Status- Full Code     Discharge plan and follow up - d/c home with HH once medically stable, need HD chair    Guevara Metzger MD  Hospital Medicine Staff  Pager [phone]

## 2019-03-22 NOTE — PLAN OF CARE
KEELY sent HH referral to [facility]. Patient has been accepted.     KEELY contacted [facility] to confirm HD chair time. KEELY informed that pt's chair time has been confirmed and will begin on Monday at 3:45 PM on Mondays, Wednesdays, and Fridays at [facility] located at [address] [place] [address] [place] [address].  Pt is to arrive to first visit for 3:15 PM on Monday.     Pt will require Cefepime 2 gram injection after each dialysis visit. KEELY faxed script to [facility] Charge RN (phone: [phone]; fax: [phone]). KEELY contacted dialysis unit to inform of this information.     KEELY placed signed script in pt's physical chart to be given to pt on day of discharge.     KEELY will inform pt of this information.     Eufemia Anderson, SHIRLEY  [facility]

## 2019-03-22 NOTE — PLAN OF CARE
1030 It was reported by Vadim Pickett, patient transporter, that patient fell on her knees during transport to [facility] room 11 when he was attempting to get her out of the  Wheelchair to stretcher. (unwitnessed by this writer). Ruslan Deep PCT notified this writer.     1032The patient was back  in wheelchair when this writer came into [facility] room 11 . Patient stable and in no apparent distress. She denies pain or discomfort, she denies hitting her head, vitals stable blood pressure: 111/71, Heart rate  85, Respiratory rate 18, O2 sat 100% on room air, blood glucose 135.  1035 Tea Ramirez RN supervisor notified.  1040 This writer , Alia Augustin RN , and transporter Vadim Pickett safety moved patient from wheelchair to stetcher. Bed locked and in low position, call bell within reach, side rails up x 2.   1042 Resident for Dr. Marcelino paged by , awaiting call back.  1043 This writer attempted to contact patient's daughter Rosalba Todd, no answer.  1054 Resident for Dr. Marcelino paged by , awaiting call back. The  stated it will be Dr. Echeverria.  1056 Dr. Echeverria (resident for [facility]) notified that patient fell on her knees when being transported to [facility] room 11 going from wheelchair to bed.  1056 Dr. Echeverria notified it was reported the patient fell down on her knees when being transported. He is on his way to the room.  1103 Dr. Echeverria is at the bedside.

## 2019-03-22 NOTE — ANESTHESIA PROCEDURE NOTES
Supraclavicular Brachial Plexus Single Injection Block    Patient location during procedure: OR    Reason for block: primary anesthetic   Diagnosis: ESRD   Start time: 3/22/2019 11:25 AM  Timeout: 3/22/2019 11:25 AM   End time: 3/22/2019 11:35 AM  Staffing  Anesthesiologist: Freya Ramirez MD  Resident/CRNA: Jose Manuel Aaron MD  Performed: anesthesiologist and resident/CRNA   Preanesthetic Checklist  Completed: patient identified, site marked, surgical consent, pre-op evaluation, timeout performed, IV checked, risks and benefits discussed and monitors and equipment checked  Peripheral Block  Patient position: supine  Prep: ChloraPrep  Patient monitoring: heart rate, cardiac monitor, continuous pulse ox, continuous capnometry and frequent blood pressure checks  Block type: supraclavicular  Laterality: right  Injection technique: single shot  Needle  Needle type: Stimuplex   Needle gauge: 22 G  Needle length: 2 in  Needle localization: anatomical landmarks and ultrasound guidance   -ultrasound image captured on disc.  Assessment  Injection assessment: negative aspiration, negative parasthesia and local visualized surrounding nerve  Paresthesia pain: none  Heart rate change: no  Slow fractionated injection: yes  Additional Notes  Supraclavicular block with mepivacaine 1.5% 30mL  Intercostal brachial field block performed with mepivacaine 1.5% 10mL for additional coverage.    VSS.  See anesthesia record.  Patient tolerated procedure well.

## 2019-03-22 NOTE — PROGRESS NOTES
[facility]  Vascular Surgery  Progress Note    Patient Name: Jenni Todd  MRN: [id number]  Admission Date: 3/18/2019  Primary Care Provider: Michael Tan Iii, MD    Subjective:     Interval History: Continued hypotension, bolused 250 cc of NS last night and again 250 cc this morning.  No complaints.    Post-Op Info:  Procedure(s) (LRB):  Venogram, possible intervention (Right)   2 Days Post-Op       Medications:  Continuous Infusions:   heparin (porcine) 1,000 Units/hr (03/18/19 1016)     Scheduled Meds:   sodium chloride 0.9%   Intravenous Once    sodium chloride 0.9%   Intravenous Once    ceFEPime (MAXIPIME) IVPB  1 g Intravenous Daily    loperamide  2 mg Oral TID    pantoprazole  40 mg Oral Daily    sevelamer carbonate  800 mg Oral TID WM    sodium bicarbonate  650 mg Oral BID    tuberculin  5 Units Intradermal Once     PRN Meds:acetaminophen, dextrose 50%, dextrose 50%, glucagon (human recombinant), glucose, glucose, heparin (porcine), heparin (porcine), insulin aspart U-100, ondansetron, sodium chloride 0.9%     Objective:     Vital Signs (Most Recent):  Temp: 99 °F (37.2 °C) (03/22/19 0822)  Pulse: 86 (03/22/19 0825)  Resp: 16 (03/22/19 0825)  BP: (!) 99/55 (03/22/19 0825)  SpO2: 100 % (03/22/19 0825) Vital Signs (24h Range):  Temp:  [97 °F (36.1 °C)-99 °F (37.2 °C)] 99 °F (37.2 °C)  Pulse:  [75-93] 86  Resp:  [16-20] 16  SpO2:  [96 %-100 %] 100 %  BP: ()/(42-72) 99/55          Physical Exam   Constitutional: She is oriented to person, place, and time. She appears well-developed and well-nourished. No distress.   Eyes: No scleral icterus.   Pulmonary/Chest: Effort normal. No respiratory distress.   Abdominal: Soft. She exhibits no distension.   Neurological: She is alert and oriented to person, place, and time.   Skin: Skin is warm and dry.   Psychiatric: She has a normal mood and affect. Her behavior is normal. Judgment and thought content normal.   Vitals  reviewed.      Significant Labs:  BMP:   Recent Labs   Lab 03/22/19  0311   *   *   K 4.2      CO2 19*   BUN 23*   CREATININE 7.2*   CALCIUM 8.4*   MG 1.7     CBC:   Recent Labs   Lab 03/22/19  0311   WBC 7.37   RBC 3.44*   HGB 10.7*   HCT 32.4*      MCV 94   MCH 31.1*   MCHC 33.0       Significant Diagnostics:  I have reviewed all pertinent imaging results/findings within the past 24 hours.    Assessment/Plan:     ESRD (end stage renal disease) on PD ( initiated dialysis 04/20/2004)    -To OR today (3/22) for Right arm ACCUSEAL (Early access) AVG placement  -Consent signed and placed in chart  -I have explained the indications, risks, benefits, and alternatives of the procedure in detail. The patient voices understanding and all questions have been answered. The patient agrees to proceed as planned.         Heraclio Grant MD  Vascular Surgery  [facility]

## 2019-03-22 NOTE — TRANSFER OF CARE
"Anesthesia Transfer of Care Note    Patient: Jenni Todd    Procedure(s) Performed: Procedure(s) (LRB):  INSERTION, GRAFT, ARTERIOVENOUS, RIGHT ARM (Right)  VENOGRAM (Right)    Patient location: PACU    Anesthesia Type: general    Transport from OR: Transported from OR on 6-10 L/min O2 by face mask with adequate spontaneous ventilation    Post pain: adequate analgesia    Post assessment: no apparent anesthetic complications    Post vital signs: stable    Level of consciousness: awake    Nausea/Vomiting: no nausea/vomiting    Complications: none    Transfer of care protocol was followed      Last vitals:   Visit Vitals  /62   Pulse 87   Temp 36.6 °C (97.8 °F) (Temporal)   Resp 16   Ht 5' 7" (1.702 m)   Wt 93.1 kg (205 lb 4 oz)   LMP 01/28/2014 (Approximate)   SpO2 100%   Breastfeeding? No   BMI 32.15 kg/m²     "

## 2019-03-22 NOTE — BRIEF OP NOTE
[facility]  Brief Operative Note    SUMMARY     Surgery Date: 3/22/2019     Surgeon(s) and Role:     * BESSIE Wynn III, MD - Primary     * Rodrigue Echeverria MD - Resident - Assisting        Pre-op Diagnosis:  ESRD (end stage renal disease) [N18.6]    Post-op Diagnosis:  Post-Op Diagnosis Codes:     * ESRD (end stage renal disease) [N18.6]    PROCEDURE:   RIGHT upper arm reverse loop ACCUSEAL AVG (4-7 taper)    Anesthesia: Regional    Description of Procedure:  Distal brachial artery was too calcified to clamp, so a true loop upper arm graft was created with both anast sewn in @ the upper incision.    FLOW IS REVERSED FROM THE NORMAL CONFIGURATION    Description of the findings of the procedure: MAY ACCESS WITH SMALL NEEDLES  TOMORROW    Estimated Blood Loss: 10cc         Specimens:   Specimen (12h ago, onward)    None

## 2019-03-22 NOTE — OP NOTE
Surgery Date: 3/20/2019      Surgeon(s) and Role:     * BESSIE Wynn III, MD - Primary     * Wilfrid Walker MD     Assisting Surgeon: None     Pre-op Diagnosis:  R central venous stenosis     Post-op Diagnosis: No R central venous stenosis     PROCEDURE:  R arm ascending venogram, R subclavian/innominate venogram     Anesthesia: RN IV Sedation     Description of Procedure: No central venous stenosis     Description of the findings of the procedure: as above     Estimated Blood Loss: <2cc    Complications:  None; patient tolerated the procedure well.           Condition: stable    Procedure Details:  The patient was seen in the Holding Room. The risks, benefits, complications, treatment options, and expected outcomes were discussed with the patient. The patient concurred with the proposed plan, giving informed consent.  The site of surgery properly noted/marked.   Patient was brought to the cath lab and positioned supine on the table. Patient's right arm was prepped and draped in usual sterile fashion with her midline catheter into the field. A Time Out was held and the above information confirmed.  Using the midline catheter, right arm venogram and central venogram was performed. No central venous stenosis was noted.   Midline catheter was removed and dry dressing was placed.     Patient is scheduled for Right arm AV graft placement.     Dr. Wynn was scrubbed and present for entire procedure.    Wilfrid Walker MD  Vascular/Endovascular Surgery Fellow

## 2019-03-22 NOTE — NURSING TRANSFER
Nursing Transfer Note      3/22/2019     Transfer DOSC to 1153    Transfer via stretcher    Transported by Veronica and Crystal PCT    Medicines sent: NO    Chart send with patient: Yes    Notified: daughter    Patient reassessed at: 3/22/19   1415    Upon arrival to floor: Report given to RN

## 2019-03-22 NOTE — SUBJECTIVE & OBJECTIVE
Medications:  Continuous Infusions:   heparin (porcine) 1,000 Units/hr (03/18/19 1016)     Scheduled Meds:   sodium chloride 0.9%   Intravenous Once    sodium chloride 0.9%   Intravenous Once    ceFEPime (MAXIPIME) IVPB  1 g Intravenous Daily    loperamide  2 mg Oral TID    pantoprazole  40 mg Oral Daily    sevelamer carbonate  800 mg Oral TID WM    sodium bicarbonate  650 mg Oral BID    tuberculin  5 Units Intradermal Once     PRN Meds:acetaminophen, dextrose 50%, dextrose 50%, glucagon (human recombinant), glucose, glucose, heparin (porcine), heparin (porcine), insulin aspart U-100, ondansetron, sodium chloride 0.9%     Objective:     Vital Signs (Most Recent):  Temp: 99 °F (37.2 °C) (03/22/19 0822)  Pulse: 86 (03/22/19 0825)  Resp: 16 (03/22/19 0825)  BP: (!) 99/55 (03/22/19 0825)  SpO2: 100 % (03/22/19 0825) Vital Signs (24h Range):  Temp:  [97 °F (36.1 °C)-99 °F (37.2 °C)] 99 °F (37.2 °C)  Pulse:  [75-93] 86  Resp:  [16-20] 16  SpO2:  [96 %-100 %] 100 %  BP: ()/(42-72) 99/55          Physical Exam   Constitutional: She is oriented to person, place, and time. She appears well-developed and well-nourished. No distress.   Eyes: No scleral icterus.   Pulmonary/Chest: Effort normal. No respiratory distress.   Abdominal: Soft. She exhibits no distension.   Neurological: She is alert and oriented to person, place, and time.   Skin: Skin is warm and dry.   Psychiatric: She has a normal mood and affect. Her behavior is normal. Judgment and thought content normal.   Vitals reviewed.      Significant Labs:  BMP:   Recent Labs   Lab 03/22/19 0311   *   *   K 4.2      CO2 19*   BUN 23*   CREATININE 7.2*   CALCIUM 8.4*   MG 1.7     CBC:   Recent Labs   Lab 03/22/19 0311   WBC 7.37   RBC 3.44*   HGB 10.7*   HCT 32.4*      MCV 94   MCH 31.1*   MCHC 33.0       Significant Diagnostics:  I have reviewed all pertinent imaging results/findings within the past 24 hours.

## 2019-03-22 NOTE — PLAN OF CARE
03/22/2019      Jenni Todd  [address] [facility] [address]          [facility] Medicine Dept.  [facility] [address] [place] [address] [phone] after hours  [phone] fax Diagnosis:  Peritonitis                            ceFEPIme (MAXIPIME) 2 gram injection - after each dialysis (MWF) - End date 3/28/2019          __________________________  Guevara Metzger MD  03/22/2019

## 2019-03-22 NOTE — PT/OT/SLP PROGRESS
Physical Therapy      Patient Name:  Jenni Todd   MRN:  [id number]    Patient not seen today secondary to Unavailable (Comment)(Pt off floor to surgery per NSG upon attempt in AM. Pt remains off floor in PM with writing therapist unable to make additional attempts. ). Will follow-up per POC as appropriate.     Carlos Germain, PTA

## 2019-03-22 NOTE — CARE UPDATE
RN Proactive Rounding Note  Time of Visit: 823    Admit Date: 3/18/2019  LOS: 4  Code Status: Full Code   Date of Visit: 2019  : 1970  Age: 48 y.o.  Sex: female  Race: Black or   Bed: 1153/[facility] A:   MRN: [id number]  Was the patient discharged from an ICU this admission? no   Was the patient discharged from a PACU within last 24 hours?  no  Did the patient receive conscious sedation/general anesthesia in last 24 hours?  no  Was the patient in the ED within the past 24 hours?  no  Was the patient started on NIPPV within the past 24 hours?  no  Attending Physician: Guevara Metzger MD  Primary Service: [facility]      ASSESSMENT:     Abnormal Vital Signs: BP-72/46  Clinical Issues: Circulatory; Called for hypotension. On arrival to bedside, patient lying in bed. She is AAOx3. Non-labored respirations. She denies blurry vision, light headedness, or nausea. Skin is warm and dry. Repeat NIBP to left arm 99/55. She has been NPO since midnight for procedure today at noon. Dr. Metzger notified by Dusty JAIN of hypotension and 250mL NS bolus ordered and initiated.     INTERVENTIONS/ RECOMMENDATIONS:     Continue current plan of care. Repeat NS bolus if needed for hypotension    Discussed plan of care with Dusty JAIN.    PHYSICIAN ESCALATION:     Yes/No  yes    Orders received and case discussed with Dr. Metzger .    Disposition: Remain in room 1153.    FOLLOW-UP/CONTINGENCY:     Call back the Rapid Response Nurse at [phone] for additional questions or concerns.

## 2019-03-22 NOTE — PLAN OF CARE
Primary nurse Dusty Mitchell, notified of reported patient fall. The patient is currently in the OR.

## 2019-03-22 NOTE — PROGRESS NOTES
VASCULAR STAFF    R upper arm REVERSE LOOP accuseal AVG placed today.    MAY ACCESS Sat 3/23/19 with small needles     Flow is REVERSED compared to the normal configuration.    ZENY Wynn III, MD, FACS  Professor and Chief, Vascular and Endovascular Surgery

## 2019-03-22 NOTE — PLAN OF CARE
KEELY faxed hep panel to [facility] (fax: [phone]; ph: [phone]) to obtain chair time at [facility]. SW will follow up.    2:59 PM  SW left message for [facility] to follow up on pt's chair time. KEELY waiting to hear back.    Eufemia Anderson LMSW  [facility]

## 2019-03-22 NOTE — PLAN OF CARE
Problem: Adult Inpatient Plan of Care  Goal: Plan of Care Review  Outcome: Ongoing (interventions implemented as appropriate)  Fall precaution maintained this shift call bell in reach bed in low position instructed pt to call for assistance. Blood sugar monitored ac hs no problem this shift. Iv abx given. No new skin breakdown noted this shift.  Triad cream applied to buttock.

## 2019-03-22 NOTE — ANESTHESIA POSTPROCEDURE EVALUATION
"Anesthesia Post Evaluation    Patient: Jenni Todd    Procedure(s) Performed: Procedure(s) (LRB):  INSERTION, GRAFT, ARTERIOVENOUS, RIGHT ARM (Right)    Final Anesthesia Type: regional  Patient location during evaluation: PACU  Patient participation: Yes- Able to Participate  Level of consciousness: awake and alert  Post-procedure vital signs: reviewed and stable  Pain management: adequate  Airway patency: patent  PONV status at discharge: No PONV  Anesthetic complications: no      Cardiovascular status: blood pressure returned to baseline  Respiratory status: unassisted  Hydration status: euvolemic  Follow-up not needed.        Visit Vitals  /68   Pulse 90   Temp 36.6 °C (97.8 °F) (Temporal)   Resp 16   Ht 5' 7" (1.702 m)   Wt 93.1 kg (205 lb 4 oz)   LMP 01/28/2014 (Approximate)   SpO2 100%   Breastfeeding? No   BMI 32.15 kg/m²       Pain/Yuridia Score: Yuridia Score: 9 (3/22/2019  1:49 PM)        "

## 2019-03-22 NOTE — PROGRESS NOTES
Resident with dr bender notified of pt falling when transport was transferring pt to bed from wheelchair, vss, nad, will need physician assessment. Stated ok thank you

## 2019-03-22 NOTE — PLAN OF CARE
[facility]    HOME HEALTH ORDERS  FACE TO FACE ENCOUNTER    Patient Name: Jenni Todd  YOB: 1970    PCP: Michael Tan Iii, MD   PCP Address: [address] / [place] [address]  PCP Phone Number: [phone]  PCP Fax: [phone]    Encounter Date: 03/22/2019    Admit to Home Health    Diagnoses:  Active Hospital Problems    Diagnosis  POA    *ESRD (end stage renal disease) on PD ( initiated dialysis 04/20/2004) [N18.6]  Yes     Nightly PD      Pressure injury of left buttock, stage 2 [L89.322]  Yes    Delayed surgical wound healing [T81.89XA]  Yes    Chronic systolic heart failure [I50.22]  Yes    Acute gastric ulcer with hemorrhage [K25.0]  Yes    Antibiotic-associated diarrhea [K52.1, T36.95XA]  Yes    Hemodialysis catheter dysfunction [T82.41XA]  Yes    DVT (deep venous thrombosis) [I82.409]  Unknown    Peritonitis associated with peritoneal dialysis [T85.71XA]  Yes    Hypertension [I10]  Yes    Diabetes mellitus, type 2 [E11.9]  Yes    CAD (coronary artery disease) [I25.10]  Yes     Cath 12/27/2012:   RCA PCI 2.5 x 18 and 3.0 x 26 mm BMS   Patent LAD and LCX   Normal EF     Dr. Berrios for NSTEMI       DAPT score 3           Resolved Hospital Problems   No resolved problems to display.       Future Appointments   Date Time Provider Department Center   3/23/2019  7:00 [facility]           I have seen and examined this patient face to face today. My clinical findings that support the need for the home health skilled services and home bound status are the following:  Weakness/numbness causing balance and gait disturbance due to Weakness/Debility making it taxing to leave home.    Allergies:  Review of patient's allergies indicates:   Allergen Reactions    Clindamycin Diarrhea    Flagyl [metronidazole hcl]     Metronidazole        Diet: renal diet    Activities: activity as tolerated    Nursing:   SN to  complete comprehensive assessment including routine vital signs. Instruct on disease process and s/s of complications to report to MD. Review/verify medication list sent home with the patient at time of discharge  and instruct patient/caregiver as needed. Frequency may be adjusted depending on start of care date.    Notify MD if SBP > 160 or < 90; DBP > 90 or < 50; HR > 120 or < 50; Temp > 101; Other:       CONSULTS:    Physical Therapy to evaluate and treat. Evaluate for home safety and equipment needs; Establish/upgrade home exercise program. Perform / instruct on therapeutic exercises, gait training, transfer training, and Range of Motion.  Occupational Therapy to evaluate and treat. Evaluate home environment for safety and equipment needs. Perform/Instruct on transfers, ADL training, ROM, and therapeutic exercises.      WOUND CARE    Recommendations:  -Pressure injury prevention interventions to include waffle overlay   -Triad ointment BID and prn cleaning to L buttock  -Silver hydrofiber, Aquacel AG ribbon, 2x/wk to abdomen wounds      1. Incision/Site -  Left Abdomen  Care Cleansed with:;Sterile normal saline   Dressing Applied;Foam   Packing Incision packed with  (amd packing, ordered silver hydrofiber)     2. Incision/Site - Right Abdomen  Care Cleansed with:;Sterile normal saline   Dressing Applied;Foam   Packing Incision packed with  (amd packing, placed orders for silver hydrofiber)     3. Incision/Site - Abdomen lower  Care Cleansed with:;Sterile normal saline   Dressing Applied;Foam   Packing Incision packed with  (amd packing, ordered silver hydrofiber)       4. Pressure Injury 03/07/19 1325 Left Buttocks Stage 2  Care Cleansed with:;Sterile normal saline;Applied:;Skin Barrier  (Triad)              Medications: Review discharge medications with patient and family and provide education.      Current Discharge Medication List      START taking these medications    Details   ceFEPIme (MAXIPIME) 1 gram  injection Patient need 2 g cefepime after dialysis until 3/28      pantoprazole (PROTONIX) 40 MG tablet Take 1 tablet (40 mg total) by mouth once daily.  Qty: 30 tablet, Refills: 11      sodium bicarbonate 650 MG tablet Take 1 tablet (650 mg total) by mouth 2 (two) times daily.  Qty: 60 tablet, Refills: 11         CONTINUE these medications which have CHANGED    Details   sevelamer carbonate (RENVELA) 800 mg Tab Take 1 tablet (800 mg total) by mouth 3 (three) times daily with meals.  Qty: 90 tablet, Refills: 11         CONTINUE these medications which have NOT CHANGED    Details   aspirin (ECOTRIN) 81 MG EC tablet Take 1 tablet (81 mg total) by mouth once daily.  Qty: 30 tablet, Refills: 12      atorvastatin (LIPITOR) 40 MG tablet Take 40 mg by mouth every evening.       epoetin adonay 10,000 unit/mL Soln 1 mL, epoetin adonay 20,000 unit/mL Soln 1 mL Inject 30,000 Units into the vein every 14 (fourteen) days.      gabapentin (NEURONTIN) 100 MG capsule 1 capsule 3 (three) times daily.      nateglinide (STARLIX) 120 MG tablet STARLIX 120MG 30 MINUTES BEFORE EACH MEAL.      ONETOUCH VERIO Strp USE 1 STRIP ONCE DAILY IN VITRO  Refills: 3      vitamin renal formula, B-complex-vitamin c-folic acid, (B COMPLEX-C-FOLIC ACID) 1 mg Cap Take 1 capsule by mouth once daily. 1 Capsule Oral Every day         STOP taking these medications       amLODIPine (NORVASC) 5 MG tablet Comments:   Reason for Stopping:         calcitRIOL (ROCALTROL) 0.5 MCG Cap Comments:   Reason for Stopping:         carvedilol (COREG) 3.125 MG tablet Comments:   Reason for Stopping:         cinacalcet (SENSIPAR) 90 MG Tab Comments:   Reason for Stopping:         clopidogrel (PLAVIX) 75 mg tablet Comments:   Reason for Stopping:         heparin sodium,porcine (HEPARIN, PORCINE,) 5,000 unit/mL injection Comments:   Reason for Stopping:         HUMULIN R REGULAR U-100 INSULN 100 unit/mL injection Comments:   Reason for Stopping:         PLAVIX 75 mg tablet  Comments:   Reason for Stopping:               I certify that this patient is confined to her home and needs intermittent skilled nursing care, physical therapy and occupational therapy.

## 2019-03-22 NOTE — PLAN OF CARE
03/22/19 0951   Discharge Reassessment   Assessment Type Discharge Planning Reassessment   Provided patient/caregiver education on the expected discharge date and the discharge plan Yes  (Per MD)   Do you have any problems affording any of your prescribed medications? No   Discharge Plan A Home;Home Health   Discharge Plan B Home with family   DME Needed Upon Discharge  other (see comments)  (TBD)   Patient choice form signed by patient/caregiver N/A   Anticipated Discharge Disposition Home-Health   Can the patient answer the patient profile reliably? Yes, cognitively intact   How does the patient rate their overall health at the present time? Good     CM to the Bedside to see the patient. The patient was notified that the CM team continues to follow for D/C needs and/or recommendations. D/C plan is Home with Home Health and HD at [facility] at 3:30pm. CM name and number remains on the board at the Bedside for the patient to call with any D/C needs or questions. Understanding verbalized.

## 2019-03-23 NOTE — OP NOTE
DATE OF PROCEDURE: 3/22/2019    PREOPERATIVE DIAGNOSIS: ESRD    POSTOPERATIVE DIAGNOSIS: ESRD    PROCEDURES PERFORMED:  1. RUE AV Graft placement (Accuseal 4-7 taper), REVERSED loop upper arm    ATTENDING SURGEON: ZENY Traore MD    RESIDENT: Rodrigue Echeverria MD    ANESTHESIA: Regional    KEY FINDINGS: Heavily diseased brachial artery.  Axillary artery used for arterial inflow.  Good quality vein.  No palpable radial pulse pre-procedure.  Good radial signal post procedure with minimal augmentation with graft occlusion     ESTIMATED BLOOD LOSS: 10 ml    DRAINS: None    COMPLICATIONS: None    INDICATIONS FOR PROCEDURE: The patient is a 48 y.o. female   who presented with ESRD.  She has had multiple dialysis accesses in the past that had failed.  She is currently being dialyzed via temporary catheter. Based on this surgery was indicated.    PROCEDURE IN DETAIL:     After an informed consent was obtained the patient was taken to the operating room and placed in the supine position. The patient underwent a regional block by anesthesia. The right upper extremity was prepped and draped in a sterile fashion. A skin incision was made just above the AC fossa and dissection carried down to the brachial artery. We then made a second incision in the proximal arm/axilla area, dissected through the subcutaneous tissues and identified the proximal brachial/axillary vein.  The brachial artery was noted to be heavily calcified.  We then decided not to use the brachial artery and moved to the axillary artery for our arterial inflow.  This artery was soft without gross disease.  The artery was controlled with vessel loops, opened and flushed with heparinized saline. A 4-7mm Accuseal graft was tunneled and the 4mm end sew end-to-side to the brachial artery with 6-0 Goretex suture. The distal, 7mm, end of the graft was similarly sewn end-to-side to the axillary vein with 6-0 Goretex suture. After completion of the anastomosis, the  vessel loops were released. Hemostasis was secured. Good thrill was noted in the graft and the incision was then closed in two layers, subcutaneous tissue with 3-0 Monocryl and skin with 4-0 Monocryl. There was a biphasic radial signal post op with minimal augmentation with graft occlusion.  All counts were correct.  A sterile dressing was applied.  Dr Traore was present for the entire procedure.

## 2019-03-23 NOTE — PROGRESS NOTES
Pt clotted off with 21 mins remaining in treatment. From start of Hd and restart pt had total off 3hrs and 48mins. Able to return only arterial side. Total UF off 1.3L off. Flushed arterial line with saline unable to flush venous catheter, caps applied . Post HD wt 89.9kg

## 2019-03-23 NOTE — PLAN OF CARE
SW spoke with pt's sister Sari and daughter at the bedside regarding discharge plan of care. Pt was completing dialysis at the time of visit. Sister Sari states that she does not feel that pt is ready to discharge home safely and would like pt to discharge to a SNF, if possible. Sari states that pt is unable to sit up and perform ADL's. Sari states that although pt will have some family support, pt lives alone, and family is unable to provide 24 hour care for patient. Sari states that she has not spoken with pt about SNF placement yet.     SW informed sister Sari that SNF placement is possible if pt meets criteria. SW informed Sari that pt has to be in agreement with SNF placement. Sari states that she will speak with pt and will inform SW of decision. SW informed physician of this information. Physician will speak with pt regarding this matter.    Eufemia Anderson, SHIRLEY  [facility]

## 2019-03-23 NOTE — PROGRESS NOTES
Pt arrived to unit via stretcher assisted over to bed. AAOX3. R arm in a sling denies pain or discomfort. Maintenance HD (M-W-F) initiated @ 0826 to Left femoral trialysis. UF Goal 1-2L BP permitting . Both lumens flushes well no blood return noted to venous and sluggish blood return  Noted to arterial. SBP on arrival 102, UF Goal set 1.5L  due to catheter

## 2019-03-23 NOTE — PROGRESS NOTES
[facility] Hospital Medicine                                                                     Progress Note     Team: [facility] HOSP MED A Guevara Metzger MD   Admit Date: 3/18/2019   Hospital Day: 5  JESSY:  3/24/2018  Code status: Full Code   Principal Problem: ESRD (end stage renal disease)     SUMMARY:     Jenni Todd is a 48 y.o. female w/ CAD s/p 2 PCI (2012), HFrEF 30%, HTN, DM, and ESRD on home PD admitted to [facility] on 2/21 for peritonitis. PD catheter removed. Due to hx of recurrent peritonitis she was transitioned to HD. Bilateral IJ tunneled catheters were attempted without success. Now has a temporary dialysis catheter in her left fem. Transferred to [facility] for vascular. Nephrology access team attempted a tunneled line and were unsuccessful.  Vascular surgery consulted for HD access. Hospital course complicated by GIB with low risk ulcer. S/p permacath 3/22.    SUBJECTIVE:     Pt was seen and examined at bedside. No issues overnight. BP had been stable for the past 24 hrs now. Surgery went well yesterday. No complaints this AM.     ROS (Positive in Bold, otherwise negative)  Pain Scale: 0 /10   Constitutional: fever, chills, night sweats  CV: chest pain, edema, palpitations  Resp: SOB, cough, sputum production  GI: changes in appetite, NVDC, pain, melena, hematochezia, GERD, hematemesis  : Dysuria, hematuria, urinary urgency, frequency  MSK: arthralgia/myalgia, joint swelling  SKIN: rashes, pruritis, petechiae   Neuro/Psych: FND, anxiety, depression      OBJECTIVE:     Vitals:  Temp:  [97.9 °F (36.6 °C)-99 °F (37.2 °C)]   Pulse:  [79-97]   Resp:  [14-20]   BP: (102-126)/(61-79)   SpO2:  [97 %-100 %]      I & O (Last 24H):     Intake/Output Summary (Last 24 hours) at 3/23/2019 1444  Last data filed at 3/23/2019 0600  Gross per 24 hour   Intake 260  ml   Output 0 ml   Net 260 ml       GEN: in no acute distress. Nontoxic. Resting in bed. Cooperative.  HEENT: NCAT. PERRL. EOMI. Conjunctivae/corneas clear, sclera Anicteric.  CVS: RRR. Normal s1 s2 no murmur, click, rub or gallop  LUNG: CTAB. Normal respiratory effort. No wheezes, rhonchi, or crackles.  ABD: PD site noted. Normoactive BS, soft, NT, ND, no masses or organomegaly.  EXT: No edema. No cyanosis. Full ROM.  SKIN: color, texture, turgor normal. No rashes or lesions  NEURO: Alert, oriented x 4, Spont mvt of all extremities with no focal deficits noted.      All recent labs and imaging has been reviewed.     Recent Results (from the past 24 hour(s))   POCT glucose    Collection Time: 03/22/19  4:51 PM   Result Value Ref Range    POCT Glucose 126 (H) 70 - 110 mg/dL   POCT glucose    Collection Time: 03/22/19  8:28 PM   Result Value Ref Range    POCT Glucose 173 (H) 70 - 110 mg/dL   Comprehensive Metabolic Panel (CMP)    Collection Time: 03/23/19  3:57 AM   Result Value Ref Range    Sodium 136 136 - 145 mmol/L    Potassium 4.6 3.5 - 5.1 mmol/L    Chloride 105 95 - 110 mmol/L    CO2 18 (L) 23 - 29 mmol/L    Glucose 182 (H) 70 - 110 mg/dL    BUN, Bld 32 (H) 6 - 20 mg/dL    Creatinine 8.8 (H) 0.5 - 1.4 mg/dL    Calcium 8.4 (L) 8.7 - 10.5 mg/dL    Total Protein 6.4 6.0 - 8.4 g/dL    Albumin 1.7 (L) 3.5 - 5.2 g/dL    Total Bilirubin 0.1 0.1 - 1.0 mg/dL    Alkaline Phosphatase 107 55 - 135 U/L    AST 13 10 - 40 U/L    ALT 8 (L) 10 - 44 U/L    Anion Gap 13 8 - 16 mmol/L    eGFR if African American 5.5 (A) >60 mL/min/1.73 m^2    eGFR if non  4.8 (A) >60 mL/min/1.73 m^2   Magnesium    Collection Time: 03/23/19  3:57 AM   Result Value Ref Range    Magnesium 1.7 1.6 - 2.6 mg/dL   Phosphorus    Collection Time: 03/23/19  3:57 AM   Result Value Ref Range    Phosphorus 5.3 (H) 2.7 - 4.5 mg/dL   CBC with Automated Differential    Collection Time: 03/23/19  3:57 AM   Result Value Ref Range    WBC 9.12 3.90 -  12.70 K/uL    RBC 3.29 (L) 4.00 - 5.40 M/uL    Hemoglobin 10.1 (L) 12.0 - 16.0 g/dL    Hematocrit 33.0 (L) 37.0 - 48.5 %     (H) 82 - 98 fL    MCH 30.7 27.0 - 31.0 pg    MCHC 30.6 (L) 32.0 - 36.0 g/dL    RDW 19.3 (H) 11.5 - 14.5 %    Platelets 284 150 - 350 K/uL    MPV 9.7 9.2 - 12.9 fL    Immature Granulocytes 0.9 (H) 0.0 - 0.5 %    Gran # (ANC) 5.6 1.8 - 7.7 K/uL    Immature Grans (Abs) 0.08 (H) 0.00 - 0.04 K/uL    Lymph # 2.4 1.0 - 4.8 K/uL    Mono # 0.9 0.3 - 1.0 K/uL    Eos # 0.1 0.0 - 0.5 K/uL    Baso # 0.04 0.00 - 0.20 K/uL    nRBC 1 (A) 0 /100 WBC    Gran% 61.8 38.0 - 73.0 %    Lymph% 26.4 18.0 - 48.0 %    Mono% 9.3 4.0 - 15.0 %    Eosinophil% 1.2 0.0 - 8.0 %    Basophil% 0.4 0.0 - 1.9 %    Differential Method Automated    Protime-INR    Collection Time: 03/23/19  3:57 AM   Result Value Ref Range    Prothrombin Time 11.8 9.0 - 12.5 sec    INR 1.2 0.8 - 1.2       Recent Labs   Lab 03/21/19  2030 03/22/19  0758 03/22/19  1040 03/22/19  1405 03/22/19  1651 03/22/19 2028   POCTGLUCOSE 131* 143* 135* 118* 126* 173*       Hemoglobin A1C   Date Value Ref Range Status   09/12/2018 6.3 (H) 4.0 - 5.6 % Final     Comment:     ADA Screening Guidelines:  5.7-6.4%  Consistent with prediabetes  >or=6.5%  Consistent with diabetes  High levels of fetal hemoglobin interfere with the HbA1C  assay. Heterozygous hemoglobin variants (HbS, HgC, etc)do  not significantly interfere with this assay.   However, presence of multiple variants may affect accuracy.     11/30/2017 7.9 (H) 4.0 - 5.6 % Final     Comment:     According to ADA guidelines, hemoglobin A1c <7.0% represents  optimal control in non-pregnant diabetic patients. Different  metrics may apply to specific patient populations.   Standards of Medical Care in Diabetes-2016.  For the purpose of screening for the presence of diabetes:  <5.7%     Consistent with the absence of diabetes  5.7-6.4%  Consistent with increasing risk for diabetes   (prediabetes)  >or=6.5%   Consistent with diabetes  Currently, no consensus exists for use of hemoglobin A1c  for diagnosis of diabetes for children.  This Hemoglobin A1c assay has significant interference with fetal   hemoglobin   (HbF). The results are invalid for patients with abnormal amounts of   HbF,   including those with known Hereditary Persistence   of Fetal Hemoglobin. Heterozygous hemoglobin variants (HbAS, HbAC,   HbAD, HbAE, HbA2) do not significantly interfere with this assay;   however, presence of multiple variants in a sample may impact the %   interference.     02/22/2017 9.0 (H) 4.5 - 6.2 % Final     Comment:     According to ADA guidelines, hemoglobin A1C <7.0% represents  optimal control in non-pregnant diabetic patients.  Different  metrics may apply to specific populations.   Standards of Medical Care in Diabetes - 2016.  For the purpose of screening for the presence of diabetes:  <5.7%     Consistent with the absence of diabetes  5.7-6.4%  Consistent with increasing risk for diabetes   (prediabetes)  >or=6.5%  Consistent with diabetes  Currently no consensus exists for use of hemoglobin A1C  for diagnosis of diabetes for children.          Active Hospital Problems    Diagnosis  POA    *ESRD (end stage renal disease) on PD ( initiated dialysis 04/20/2004) [N18.6]  Yes     Nightly PD      Pressure injury of left buttock, stage 2 [L89.322]  Yes    Delayed surgical wound healing [T81.89XA]  Yes    Chronic systolic heart failure [I50.22]  Yes    Acute gastric ulcer with hemorrhage [K25.0]  Yes    Antibiotic-associated diarrhea [K52.1, T36.95XA]  Yes    Hemodialysis catheter dysfunction [T82.41XA]  Yes    DVT (deep venous thrombosis) [I82.409]  Unknown    Peritonitis associated with peritoneal dialysis [T85.71XA]  Yes    ESRD (end stage renal disease) on dialysis [N18.6, Z99.2]  Not Applicable    Hypertension [I10]  Yes    Diabetes mellitus, type 2 [E11.9]  Yes    CAD (coronary artery disease) [I25.10]  Yes      Cath 12/27/2012:   RCA PCI 2.5 x 18 and 3.0 x 26 mm BMS   Patent LAD and LCX   Normal EF     Dr. Berrios for NSTEMI       DAPT score 3           Resolved Hospital Problems   No resolved problems to display.          ASSESSMENT AND PLAN:     ESRD on HD  Status post removal of PD catheter for concern regarding mutliple organsims growing in peritoneal fluid. Attempts at tunneled catheter in IJ x 2 during this admission. Left femoral trialysis placed.   - Nephrology following, ESRD mgmt as per nephro  - Currently have left femoral trialysis catheter  - No IVs in R arm  - S/p OR 3/22 per vascular for permanent cath    Hypotension - resolved  Antibiotic-associated diarrhea  - Multiple episodes of hypotension overnight, suspecting from recurrent diarrhea at this time and maybe PVD  - Fluids prn and loperamide, continue to monitor   - Not on any antihypertensives at this time and HH stable  - not consistent with C diff.  Has been C diff negative.    - barrier creams and antidiarrheals ordered    Peritonitis due to peritoneal dialysis catheter   - Plan is for cefepime until March 28th, renal dosing  - As per ID 2g cefepime once discharged after HD  - Wound care consulted for PD site wound care    CAD  - remote stents. Was on DAPT, continuing on Asa (due to bleed plavix held)  - holding BB    Acute gastric ulcer with hemorrhage  GIB  - no more bleeding, on PPI  - biopsy neg for malignancy or H.pylori    Pressure injury stage 2 on left buttock   - consulted wound care    Chronic systolic heart failure  - EF 30% on TTE, stable  - Hold antihypertensive due to hypotension    Diabetes mellitus, type 2  - A1c 6.3, was getting intra-peritoneal insulin. Catheter now removed.   - SSI for now         Prophylaxis- SCDs for now    Code Status- Full Code     Discharge plan and follow up - d/c home with HH once medically stable, HD chair has been set up    Guevara Metzger MD  Hospital Medicine Staff  Pager [phone]

## 2019-03-23 NOTE — PROGRESS NOTES
Spoke with Dr. Carrera: notified of findings and recs regarding trialysis and AVG as documented in my note today at 1016

## 2019-03-23 NOTE — PROGRESS NOTES
Spoke with Dr. Lua: notified of findings and recs regarding trialysis and AVG as documented in my note today at 1017

## 2019-03-23 NOTE — PLAN OF CARE
Problem: Adult Inpatient Plan of Care  Goal: Plan of Care Review  Outcome: Ongoing (interventions implemented as appropriate)  Pt slept for part of the night. R arm sling in place to right arm where new graft placed. Bruit palpated. Trialysis cath to left femoral. Old fistula to left upper arm. Limb alert on to right arm. CBG wnl. Will continue to monitor.

## 2019-03-23 NOTE — PROGRESS NOTES
Pt clotted off unable to return venous or arterial. Lines flushed. Per NP Restart pt and administer 2000u of heparin upon restart

## 2019-03-23 NOTE — PROGRESS NOTES
Pt restarted @ 1000 unable to achieve no greater than 150bfr 2000u of heparin administered on administration, however cathter not functioning NP and bedside decision made to recirculate to possible use ESSAR graft placed yesterday, pt not comfortable with accessing so soon. Upon further evaluation of SESAR graft no bruit was heard by NP or CN. Decision made to TPA catheter and shorten HD time to attempt to remove some fluid from pt.

## 2019-03-23 NOTE — PLAN OF CARE
Vascular Surgery Note    Pt currenly dialyzing via trialysis. She refused to let dialysis unit access her RUE accuseal AVG today due to pain. States that she wants to wait. Informed her that it is safe to use at this time, and that the dialysis nurse can access it when she feels ready    - OK to access RUE AVG with small needles  - follow up with Dr. Wynn in 2 weeks    Dav Lua MD   Fellow, Vascular/Endovascular Surgery

## 2019-03-23 NOTE — PROGRESS NOTES
"HEMODIALYSIS NOTE  Patient evaluated while undergoing hemodialysis indicated for ESRD. Not tolerating session with current UFR 2/2 poor functioning trialysis    -pt S/P AVG placement 3/22/19 w/ recs per Dr. Wynn's 3/22/19 note at 1:49PM: "MAY ACCESS Sat 3/23/19 with small needles"   -notified per HD RN, pt line clotting on dialysis S/P heparin administration w/ max attainable  to trialysis line (before and after heparin administration today)  -d/w pt and HD RN AVG use today; pt states that she does not feel comfortable accessing AVG today  -pt consented to NP assessment of AVG  -R arm in sling; localized swelling noted to arm; tender on attempt to palpate and auscultate  -AVG to L upper arm with -thrill/-bruit; assessment confirmed with Jovani, HD KARIME charge nurse  -drsgs to AVG with scant pink drainage  -post-wt last HD 88.7kg  -pre-wt 91.4kg  -will Cathflo trialysis in attempt to give HD today  -[facility]  -recommend Vascular Surgery eval    -start epogen  -ferritin 833 3/18/19; on ATB   -Ca adjusted in dialysate bath  -continue sevelamer carbonate  -d/c sodium bicarbonate  -continue renal diet          "

## 2019-03-24 NOTE — PLAN OF CARE
Problem: Physical Therapy Goal  Goal: Physical Therapy Goal  Goals to be met by: 2019     Patient will increase functional independence with mobility by performin. Supine to sit with Modified [place]  2. Sit to supine with Modified [place]  3. Sit to stand transfer with Modified [place]  4. Gait  x 150 feet with Supervision using Rolling Walker.   5. Ascend/Descend 4 inch curb step with Supervision using Rolling Walker.     Outcome: Ongoing (interventions implemented as appropriate)  Pt's goals remain appropriate and pt will continue to benefit from skilled PT services to work towards improved functional mobility including: bed mobility, transfers, up/down step, and gait.   Amelia Alan, PT  3/24/2019

## 2019-03-24 NOTE — PLAN OF CARE
Problem: Occupational Therapy Goal  Goal: Occupational Therapy Goal  Goals to be met by: 3/31/19     Patient will increase functional independence with ADLs by performing:    UE Dressing with Supervision.  LE Dressing with Moderate Assistance.  Grooming while standing at sink with Stand-by Assistance.  Toileting from toilet with Supervision for hygiene and clothing management.   Supine to sit with Minimal Assistance.  Step transfer with Stand-by Assistance  Toilet transfer to toilet with Minimal Assistance.       Outcome: Ongoing (interventions implemented as appropriate)  Re-eval completed - POC set.    SABRA Zarate

## 2019-03-24 NOTE — PROGRESS NOTES
[facility] Hospital Medicine                                                                     Progress Note     Team: [facility] HOSP MED A Guevara Metzger MD   Admit Date: 3/18/2019   Hospital Day: 6  JESSY:  3/24/2018  Code status: Full Code   Principal Problem: ESRD (end stage renal disease)     SUMMARY:     Jenni Todd is a 48 y.o. female w/ CAD s/p 2 PCI (2012), HFrEF 30%, HTN, DM, and ESRD on home PD admitted to [facility] on 2/21 for peritonitis. PD catheter removed. Due to hx of recurrent peritonitis she was transitioned to HD. Bilateral IJ tunneled catheters were attempted without success. Now has a temporary dialysis catheter in her left fem. Transferred to [facility] for vascular. Nephrology access team attempted a tunneled line and were unsuccessful.  Vascular surgery consulted for HD access. Hospital course complicated by GIB with low risk ulcer. S/p permacath 3/22.     SUBJECTIVE:     Pt was seen and examined at bedside. No issues overnight. BP stable. Patient feels unsafe to go home, she is very weak and feels unsteady. I have asked PT OT to reassess patient today. PT rec short SNF stay. OT pending. No other complaints.       ROS (Positive in Bold, otherwise negative)  Pain Scale: 0 /10   Constitutional: fever, chills, night sweats  CV: chest pain, edema, palpitations  Resp: SOB, cough, sputum production  GI: changes in appetite, NVDC, pain, melena, hematochezia, GERD, hematemesis  : Dysuria, hematuria, urinary urgency, frequency  MSK: arthralgia/myalgia, joint swelling  SKIN: rashes, pruritis, petechiae   Neuro/Psych: FND, anxiety, depression      OBJECTIVE:     Vitals:  Temp:  [97 °F (36.1 °C)-99.1 °F (37.3 °C)]   Pulse:  [82-98]   Resp:  [16-18]   BP: ()/(52-74)   SpO2:  [17 %-100 %]      I & O (Last 24H):     Intake/Output Summary (Last 24  hours) at 3/24/2019 1151  Last data filed at 3/23/2019 2120  Gross per 24 hour   Intake 60 ml   Output 2343 ml   Net -2283 ml       GEN: in no acute distress. Nontoxic. Resting in bed. Cooperative.  HEENT: NCAT. PERRL. EOMI. Conjunctivae/corneas clear, sclera Anicteric.  CVS: RRR. Normal s1 s2 no murmur, click, rub or gallop  LUNG: CTAB. Normal respiratory effort. No wheezes, rhonchi, or crackles.  ABD: PD site noted. Normoactive BS, soft, NT, ND, no masses or organomegaly.  EXT: No edema. No cyanosis. Full ROM. Right arm access dry  SKIN: color, texture, turgor normal. No rashes or lesions  NEURO: Alert, oriented x 4, Spont mvt of all extremities with no focal deficits noted.      All recent labs and imaging has been reviewed.     Recent Results (from the past 24 hour(s))   POCT glucose    Collection Time: 03/23/19  5:40 PM   Result Value Ref Range    POCT Glucose 200 (H) 70 - 110 mg/dL   POCT glucose    Collection Time: 03/23/19  9:08 PM   Result Value Ref Range    POCT Glucose 209 (H) 70 - 110 mg/dL   Comprehensive Metabolic Panel (CMP)    Collection Time: 03/24/19  6:01 AM   Result Value Ref Range    Sodium 137 136 - 145 mmol/L    Potassium 4.3 3.5 - 5.1 mmol/L    Chloride 103 95 - 110 mmol/L    CO2 22 (L) 23 - 29 mmol/L    Glucose 155 (H) 70 - 110 mg/dL    BUN, Bld 27 (H) 6 - 20 mg/dL    Creatinine 7.2 (H) 0.5 - 1.4 mg/dL    Calcium 8.1 (L) 8.7 - 10.5 mg/dL    Total Protein 6.0 6.0 - 8.4 g/dL    Albumin 1.6 (L) 3.5 - 5.2 g/dL    Total Bilirubin 0.2 0.1 - 1.0 mg/dL    Alkaline Phosphatase 102 55 - 135 U/L    AST 12 10 - 40 U/L    ALT 6 (L) 10 - 44 U/L    Anion Gap 12 8 - 16 mmol/L    eGFR if African American 7.0 (A) >60 mL/min/1.73 m^2    eGFR if non  6.1 (A) >60 mL/min/1.73 m^2   Magnesium    Collection Time: 03/24/19  6:01 AM   Result Value Ref Range    Magnesium 1.5 (L) 1.6 - 2.6 mg/dL   Phosphorus    Collection Time: 03/24/19  6:01 AM   Result Value Ref Range    Phosphorus 4.8 (H) 2.7 - 4.5  mg/dL   CBC with Automated Differential    Collection Time: 03/24/19  6:01 AM   Result Value Ref Range    WBC 9.16 3.90 - 12.70 K/uL    RBC 3.16 (L) 4.00 - 5.40 M/uL    Hemoglobin 9.6 (L) 12.0 - 16.0 g/dL    Hematocrit 31.6 (L) 37.0 - 48.5 %     (H) 82 - 98 fL    MCH 30.4 27.0 - 31.0 pg    MCHC 30.4 (L) 32.0 - 36.0 g/dL    RDW 19.4 (H) 11.5 - 14.5 %    Platelets 210 150 - 350 K/uL    MPV 10.1 9.2 - 12.9 fL    Immature Granulocytes 0.8 (H) 0.0 - 0.5 %    Gran # (ANC) 5.6 1.8 - 7.7 K/uL    Immature Grans (Abs) 0.07 (H) 0.00 - 0.04 K/uL    Lymph # 2.6 1.0 - 4.8 K/uL    Mono # 0.7 0.3 - 1.0 K/uL    Eos # 0.2 0.0 - 0.5 K/uL    Baso # 0.04 0.00 - 0.20 K/uL    nRBC 1 (A) 0 /100 WBC    Gran% 61.3 38.0 - 73.0 %    Lymph% 28.3 18.0 - 48.0 %    Mono% 7.3 4.0 - 15.0 %    Eosinophil% 1.9 0.0 - 8.0 %    Basophil% 0.4 0.0 - 1.9 %    Differential Method Automated    Protime-INR    Collection Time: 03/24/19  6:01 AM   Result Value Ref Range    Prothrombin Time 11.2 9.0 - 12.5 sec    INR 1.1 0.8 - 1.2   POCT glucose    Collection Time: 03/24/19  8:06 AM   Result Value Ref Range    POCT Glucose 159 (H) 70 - 110 mg/dL       Recent Labs   Lab 03/22/19  1405 03/22/19  1651 03/22/19  2028 03/23/19  1740 03/23/19  2108 03/24/19  0806   POCTGLUCOSE 118* 126* 173* 200* 209* 159*       Hemoglobin A1C   Date Value Ref Range Status   09/12/2018 6.3 (H) 4.0 - 5.6 % Final     Comment:     ADA Screening Guidelines:  5.7-6.4%  Consistent with prediabetes  >or=6.5%  Consistent with diabetes  High levels of fetal hemoglobin interfere with the HbA1C  assay. Heterozygous hemoglobin variants (HbS, HgC, etc)do  not significantly interfere with this assay.   However, presence of multiple variants may affect accuracy.     11/30/2017 7.9 (H) 4.0 - 5.6 % Final     Comment:     According to ADA guidelines, hemoglobin A1c <7.0% represents  optimal control in non-pregnant diabetic patients. Different  metrics may apply to specific patient populations.    Standards of Medical Care in Diabetes-2016.  For the purpose of screening for the presence of diabetes:  <5.7%     Consistent with the absence of diabetes  5.7-6.4%  Consistent with increasing risk for diabetes   (prediabetes)  >or=6.5%  Consistent with diabetes  Currently, no consensus exists for use of hemoglobin A1c  for diagnosis of diabetes for children.  This Hemoglobin A1c assay has significant interference with fetal   hemoglobin   (HbF). The results are invalid for patients with abnormal amounts of   HbF,   including those with known Hereditary Persistence   of Fetal Hemoglobin. Heterozygous hemoglobin variants (HbAS, HbAC,   HbAD, HbAE, HbA2) do not significantly interfere with this assay;   however, presence of multiple variants in a sample may impact the %   interference.     02/22/2017 9.0 (H) 4.5 - 6.2 % Final     Comment:     According to ADA guidelines, hemoglobin A1C <7.0% represents  optimal control in non-pregnant diabetic patients.  Different  metrics may apply to specific populations.   Standards of Medical Care in Diabetes - 2016.  For the purpose of screening for the presence of diabetes:  <5.7%     Consistent with the absence of diabetes  5.7-6.4%  Consistent with increasing risk for diabetes   (prediabetes)  >or=6.5%  Consistent with diabetes  Currently no consensus exists for use of hemoglobin A1C  for diagnosis of diabetes for children.          Active Hospital Problems    Diagnosis  POA    *ESRD (end stage renal disease) on PD ( initiated dialysis 04/20/2004) [N18.6]  Yes     Nightly PD      Pressure injury of left buttock, stage 2 [L89.322]  Yes    Delayed surgical wound healing [T81.89XA]  Yes    Chronic systolic heart failure [I50.22]  Yes    Acute gastric ulcer with hemorrhage [K25.0]  Yes    Antibiotic-associated diarrhea [K52.1, T36.95XA]  Yes    Hemodialysis catheter dysfunction [T82.41XA]  Yes    DVT (deep venous thrombosis) [I82.409]  Unknown    Peritonitis associated  with peritoneal dialysis [T85.71XA]  Yes    ESRD (end stage renal disease) on dialysis [N18.6, Z99.2]  Not Applicable    Hypertension [I10]  Yes    Diabetes mellitus, type 2 [E11.9]  Yes    CAD (coronary artery disease) [I25.10]  Yes     Cath 12/27/2012:   RCA PCI 2.5 x 18 and 3.0 x 26 mm BMS   Patent LAD and LCX   Normal EF     Dr. Berrios for NSTEMI       DAPT score 3           Resolved Hospital Problems   No resolved problems to display.        ASSESSMENT AND PLAN:     ESRD on HD  Status post removal of PD catheter for concern regarding mutliple organsims growing in peritoneal fluid. Attempts at tunneled catheter in IJ x 2 during this admission. Left femoral trialysis placed.   - Nephrology following, ESRD mgmt as per nephro  - Has left femoral trialysis catheter  - No IVs in R arm  - S/p OR 3/22 per vascular for permanent cath    Hypotension - resolved  Antibiotic-associated diarrhea - resolved, now soft stool  - Multiple episodes of hypotension suspecting from recurrent diarrhea, dialysis and maybe PVD  - Fluids prn and loperamide, continue to monitor   - Not on any antihypertensives at this time and HH stable  - Has been C diff negative.    - Barrier creams and antidiarrheals ordered    Peritonitis due to peritoneal dialysis catheter   - Plan is for cefepime until March 28th, renal dosing  - As per ID 2g cefepime once discharged after HD  - Wound care consulted for PD site wound care    CAD  - remote stents. Was on DAPT, continuing on Asa (due to bleed plavix held)  - holding BB    Acute gastric ulcer with hemorrhage  GIB  - no more bleeding, on PPI  - biopsy neg for malignancy or H.pylori    Pressure injury stage 2 on left buttock   - consulted wound care    Chronic systolic heart failure  - EF 30% on TTE, stable  - Hold antihypertensive due to hypotension    Diabetes mellitus, type 2  - A1c 6.3, was getting intra-peritoneal insulin. Catheter now removed.   - SSI for now     Debility  Generalized  weakness  - PT OT to reassess  - PT rec SNF, referrals sent       Prophylaxis- SCDs for now    Code Status- Full Code     Discharge plan and follow up - pending PT OT for SNF, referrals sent to SNF    Guevara Metzger MD  [facility] Medicine Staff  Pager [phone]

## 2019-03-24 NOTE — PT/OT/SLP PROGRESS
"Physical Therapy Treatment    Patient Name:  Jenni Todd   MRN:  [id number]    Recommendations:     Discharge Recommendations:  nursing facility, skilled(pt required increased assist with mobility and states she has no one to assist upon D/C)   Discharge Equipment Recommendations: commode   Barriers to discharge: Inaccessible home and Decreased caregiver support 1 ИВАН and requires assist for mobility    Assessment:     Jenni Todd is a 48 y.o. female admitted with a medical diagnosis of ESRD (end stage renal disease).  She presents with the following impairments/functional limitations:  weakness, impaired functional mobilty, gait instability, impaired endurance, decreased upper extremity function, decreased lower extremity function, impaired balance . Pt required max assist for bed mobility and moderate assist for transfers.     Rehab Prognosis: Good; patient would benefit from acute skilled PT services to address these deficits and reach maximum level of function.    Recent Surgery: Procedure(s) (LRB):  INSERTION, GRAFT, ARTERIOVENOUS, RIGHT ARM (Right) 2 Days Post-Op    Plan:     During this hospitalization, patient to be seen 3 x/week to address the identified rehab impairments via gait training, therapeutic activities, therapeutic exercises, neuromuscular re-education and progress toward the following goals:    · Plan of Care Expires:  04/16/19    Subjective   "I am just so weak"    Pain/Comfort:  · Pain Rating 1: 0/10(pt states she is protective of her R UE due to recent procedure)  · Location - Side 1: Right  · Location 1: arm  · Pain Addressed 1: Reposition  · Pain Rating Post-Intervention 1: 0/10      Objective:     Communicated with nurse prior to session.  Patient found supine with pulse ox (continuous), telemetry, peripheral IV upon PT entry to room.     General Precautions: Standard, fall   Orthopedic Precautions:N/A   Braces: N/A     Functional Mobility:  · Bed Mobility:     · Supine to Sit: " maximal assistance  · Sit to Supine: moderate assistance  · Transfers:     · Sit to Stand:  moderate assistance with rolling walker x 2 trials  · Bed to/from Chair: moderate assistance with  rolling walker  using  Stand Pivot  · Gait: 30ft then 30ft with RW with minimal assist with bedside chair following. pt performed gait with flexed trunk, decreased step length, and at slow pace. Pt limited with gait distance due to weakness and fatigue.    AM-PAC 6 CLICK MOBILITY  Turning over in bed (including adjusting bedclothes, sheets and blankets)?: 2  Sitting down on and standing up from a chair with arms (e.g., wheelchair, bedside commode, etc.): 2  Moving from lying on back to sitting on the side of the bed?: 2  Moving to and from a bed to a chair (including a wheelchair)?: 2  Need to walk in hospital room?: 2  Climbing 3-5 steps with a railing?: 2  Basic Mobility Total Score: 12     Patient left supine (transferred back to bed after sitting up in bedside chair ~ 45 min) with all lines intact, call button in reach and nurse notified..    GOALS:   Multidisciplinary Problems     Physical Therapy Goals        Problem: Physical Therapy Goal    Goal Priority Disciplines Outcome Goal Variances Interventions   Physical Therapy Goal     PT, PT/OT Ongoing (interventions implemented as appropriate)     Description:  Goals to be met by: 2019     Patient will increase functional independence with mobility by performin. Supine to sit with Modified [place]  2. Sit to supine with Modified [place]  3. Sit to stand transfer with Modified [place]  4. Gait  x 150 feet with Supervision using Rolling Walker.   5. Ascend/Descend 4 inch curb step with Supervision using Rolling Walker.                      Time Tracking:     PT Received On: 19  PT Start Time: 955     PT Stop Time: 5(time added due to PT returned to transfer pt back to bed)  PT Total Time (min): 30 min     Billable Minutes: Gait Training 20  and Therapeutic Activity 10    Treatment Type: Treatment  PT/PTA: PT           Amelia Alan, PT  03/24/2019

## 2019-03-24 NOTE — DISCHARGE SUMMARY
[facility]   Discharge Summary  [facility]    Attending Physician: Dr. Severyn Yaroshevsky  Resident: Anamaria Beltre    Date of Admit: 2/21/2019  Date of Discharge 3/18/19  Condition: Stable  Disposition: Transferred to [facility].  Discharge Diagnoses     Patient Active Problem List   Diagnosis    Neuropathy    ESRD (end stage renal disease) on PD ( initiated dialysis 04/20/2004)    FSGS (focal segmental glomerulosclerosis) biopsy proven with collapsing features    Anemia in chronic kidney disease, on chronic dialysis    Hypertension    Hyperlipidemia    Obesity    CAD (coronary artery disease)    Diabetes mellitus, type 2    Awaiting organ transplant status    Bulging discs - symptomatic     Spinal stenosis of sacral and sacrococcygeal region    Hypertensive renal disease    Hypertension associated with diabetes    Hypoalbuminemia    Cerebral infarction due to embolism of middle cerebral artery    Gait abnormality    Chronic low back pain    DDD (degenerative disc disease), lumbar    PAD (peripheral artery disease)    ESRD (end stage renal disease) on dialysis    Gastrointestinal hemorrhage with hematemesis    Peritonitis associated with peritoneal dialysis    Peritonitis due to infected peritoneal dialysis catheter    Abdominal pain    Occult GI bleeding    Hypotension due to hypovolemia    Nausea and vomiting    Cardiomyopathy    Chronic systolic heart failure    Acute gastric ulcer with hemorrhage    Antibiotic-associated diarrhea    Hemodialysis catheter dysfunction    DVT (deep venous thrombosis)    Pressure injury of left buttock, stage 2    Delayed surgical wound healing     Consultants and Procedures   IP CONSULT TO NEPH[facility]OLOGY-[facility]  IP CONSULT TO GI  IP CONSULT TO GENERAL SURGERY  IP CONSULT TO INFECTIOUS DISEASES  IP CONSULT TO GASTROENTEROLOGY-[facility]  IP CONSULT TO CARDIOLOGY-[facility]  IP CONSULT TO INTERVENTIONAL  RADIOLOGY    Brief History of Present Illness     Ms. Jenni Todd is a 48 y.o. female w/ PMH of HTN, DM, PAD, AoCD 2/2 CKD, on peritoneal dialysis since 2004 presenting to the ED from [facility] for concerns of peritonitis. Patient was in her usual state of health until Sunday. Patient reports that she first had diarrhea and abdominal cramping. Patient has not been sleeping well since Sunday due to the abdominal cramping. Patient also reports diaphoresis, fatigue and loss of appetite. At first patient did not think she had peritonitis as she had 5-6 episodes before with her last episode 10/2017.      Today, patient saw her PD nurse and started to vomit large amounts of dark, green emesis that help improved her symptoms. Then patient continued to have projectile vomiting that was dark and watery. Patient reports having cloudy fluid removed from her peritoneal cath and fluid was sent for culture. Patient received a dose of Vanc and Fortaz at 11:30AM on 2/21.      Patient received her flu vaccine this year. Denies any sick contacts or change in diet.      Hospital Course By Problem with Pertinent Findings   Upper GI bleed  Repeated scope done by [facility] shows clean based gastric ulcer. Cnt protonix. Received multiple transfusion, H/H has been stable after the endoscopy.        Peritonitis 2/2 Peritoneal Dialysis  PD initiated on 4/20/04  Remains on abx  Continue IP oxacillin, IV vanc, and IV bactrim vanc level remains supra therapeutic so will continue to hold IV vanc today; last dosed on 3/7  PD fluid aerobic cx sensitive to bactrim  Wbc was not at the goal-targeted therapy  General surgery c/s and recommend removal and PD catheter and placement of permacath. However, no vascular surgeon in [facility] is able to perform accuaccess gore access. Contacted several vascular surgeon including private practice group, nothing is confirmed. Decision is made to transfer the patient to a higher level of care where vascular  surgeon and surgical staff are readily available. Transferred center was contact a couple of time. [facility] accepted the transfer.  Patient's ECHO with EF 30% but she is asymptomatic and without evidence of S3. She denies orthopnea or PND and is able to lie flat w/o symptoms.   cnt cefipime, vozyn and diflucand were dc'd per ID recs.             ESRD  Managed by Nephro.   In transition between HD and PD.     Hypotension  Resolved after transfusion.     Anemia of Chronic Disease secondary to CKD  Stable for now.           DM  A1C (9/12/18): 6.3  Continue to monitor with POCT glucose QID  Currently on SSI           Discharge Medications     Discharge Medication List as of 3/18/2019 12:46 AM      START taking these medications    Details   !! gabapentin (NEURONTIN) 300 MG capsule TAKE 3 CAPSULES (900 MG TOTAL) BY MOUTH 3 (THREE) TIMES A DAY., Normal       !! - Potential duplicate medications found. Please discuss with provider.      CONTINUE these medications which have NOT CHANGED    Details   aspirin (ECOTRIN) 81 MG EC tablet Take 1 tablet (81 mg total) by mouth once daily., Starting 1/16/2017, Until Discontinued, No Print      atorvastatin (LIPITOR) 40 MG tablet Take 40 mg by mouth every evening. , Historical Med      epoetin adonay 10,000 unit/mL Soln 1 mL, epoetin adonay 20,000 unit/mL Soln 1 mL Inject 30,000 Units into the vein every 14 (fourteen) days., Historical Med      !! gabapentin (NEURONTIN) 100 MG capsule 1 capsule 3 (three) times daily., Starting Wed 6/6/2018, Historical Med      nateglinide (STARLIX) 120 MG tablet STARLIX 120MG 30 MINUTES BEFORE EACH MEAL., Until Discontinued, Historical Med      ONETOUCH VERIO Strp USE 1 STRIP ONCE DAILY IN VITRO, Historical Med      vitamin renal formula, B-complex-vitamin c-folic acid, (B COMPLEX-C-FOLIC ACID) 1 mg Cap Take 1 capsule by mouth once daily. 1 Capsule Oral Every day, Until Discontinued, Historical Med      amLODIPine (NORVASC) 5 MG tablet Take 1 tablet  (5 mg total) by mouth once daily., Starting Sun 9/23/2018, Until Mon 9/23/2019, Normal      calcitRIOL (ROCALTROL) 0.5 MCG Cap Take 1 capsule by mouth every morning. , Historical Med      carvedilol (COREG) 3.125 MG tablet Take 1 tablet (3.125 mg total) by mouth 2 (two) times daily., Starting Sat 9/22/2018, Until Sun 9/22/2019, Normal      cinacalcet (SENSIPAR) 90 MG Tab Take 1 tablet by mouth every evening. , Historical Med      !! clopidogrel (PLAVIX) 75 mg tablet Take 1 tablet (75 mg total) by mouth once daily., Starting Wed 1/30/2019, Until Thu 1/30/2020, Normal      heparin sodium,porcine (HEPARIN, PORCINE,) 5,000 unit/mL injection Inject 1.6 mLs (8,000 Units total) into the skin every 12 (twelve) hours., Starting Mon 9/17/2018, Normal      HUMULIN R REGULAR U-100 INSULN 100 unit/mL injection Inject 200 units into dialysis bag every evening., Starting Fri 2/23/2018, Historical Med      !! PLAVIX 75 mg tablet TAKE 1 BY MOUTH DAILY, Normal      sevelamer carbonate (RENVELA) 800 mg Tab Take 3 to 4 tablets by mouth twice daily as needed, Starting Tue 3/6/2018, Historical Med       !! - Potential duplicate medications found. Please discuss with provider.        Discharge Information:   Patient Instructions:   No discharge procedures on file.    Follow-up Information     Gurvinder Watt MD On 2/6/2019.    Specialty:  Nephrology  Why:  2:30pm  Contact information:  [address]  [address]  [facility]  [place] [address] [phone]               Please follow up with [facility] clinic one medically cleared and discharged from [facility]. Please make an appointment within 3 days.    Anamaria Beltre  03/24/2019  5:19 PM

## 2019-03-24 NOTE — PT/OT/SLP RE-EVAL
Occupational Therapy   Re-evaluation    Name: Jenni Todd  MRN: [id number]  Admitting Diagnosis:  ESRD (end stage renal disease) 2 Days Post-Op    Recommendations:     Discharge Recommendations: nursing facility, skilled(SS-SNF)  Discharge Equipment Recommendations:  commode  Barriers to discharge:  Decreased caregiver support    Assessment:     Jenni Todd is a 48 y.o. female with a medical diagnosis of ESRD (end stage renal disease).  She presents with decreased overall strength, ADL ability and functional mobility level since initial assessment prompting request for OT to re-assess. Feel that pt could benefit from SS-SNF since she will have only limited assistance at home and has slightly declined functionally here in the hospital.  Performance deficits affecting function are weakness, impaired endurance, impaired functional mobilty, impaired self care skills, impaired balance, gait instability, decreased upper extremity function, decreased safety awareness, decreased ROM.      Rehab Prognosis:  Good; patient would benefit from acute skilled OT services to address these deficits and reach maximum level of function.       Plan:     Patient to be seen 3 x/week to address the above listed problems via self-care/home management, therapeutic activities, therapeutic exercises  · Plan of Care Expires: 04/23/19  · Plan of Care Reviewed with: patient, daughter    Subjective       Communicated with: RN prior to session.  Pain/Comfort:  · Pain Rating 1: 0/10    Objective:     Communicated with: RN prior to session.  Patient found supine with: peripheral IV upon OT entry to room.    General Precautions: Standard, fall   Orthopedic Precautions:N/A   Braces: UE Sling(PRN per MD. Pt only using for comfort.)     Occupational Performance:    Bed Mobility:    · Patient completed Rolling/Turning to Right with stand by assistance  · Patient completed Scooting/Bridging with contact guard assistance  · Patient completed  Supine to Sit with moderate assistance  · Patient completed Sit to Supine with contact guard assistance    Functional Mobility/Transfers:  · Patient completed Sit <> Stand Transfer with moderate assistance  with  rolling walker   · Patient completed Toilet Transfer Step Transfer technique with minimum assistance to sit and max assistance to stand from low toilet with  rolling walker  · Functional Mobility: Pt walked from bed<>bathroom CGA using a RW.    Activities of Daily Living:  · Lower Body Dressing: total assistance  · Toileting: stand by assistance for pericare.    Cognitive/Visual Perceptual:  Cognitive/Psychosocial Skills:     -       Oriented to: Person, Place, Time and Situation   -       Safety awareness/insight to disability: intact     Physical Exam:  LUE AROM/MMT: WNL  Pt is able to use RUE (WFL) but is self-limiting with arm in sling as pt states that UE has been tender since AV graft placement on 3/22. Sling is only being used for comfort - not required by MD.    [facility] 6 Click:  AMPA Total Score: 18    Treatment & Education:  Education:  UE ROM/MMT  Bed mobility training / assessment  Functional mobility assessment  Sit/standing balance assessment  Educated on importance of sitting OOB in bedside chair to promote increased strength, endurance & breathing.  Discussed OT POC / Post-acute plan    Patient left left sidelying with all lines intact and call button in reach    GOALS:   Multidisciplinary Problems     Occupational Therapy Goals        Problem: Occupational Therapy Goal    Goal Priority Disciplines Outcome Interventions   Occupational Therapy Goal     OT, PT/OT Ongoing (interventions implemented as appropriate)    Description:  Goals to be met by: 3/31/19     Patient will increase functional independence with ADLs by performing:    UE Dressing with Supervision.  LE Dressing with Moderate Assistance.  Grooming while standing at sink with Stand-by Assistance.  Toileting from toilet with  Supervision for hygiene and clothing management.   Supine to sit with Minimal Assistance.  Step transfer with Stand-by Assistance  Toilet transfer to toilet with Minimal Assistance.                         History:     Past Medical History:   Diagnosis Date    Abnormal finding on Pap smear, HPV DNA positive     Anemia associated with chronic renal failure     on Epogen    Blood type B+     Bulging discs - symptomatic      CAD (coronary artery disease)     Cardiomyopathy 3/13/2019    Diabetes mellitus, type 2     ESRD (end stage renal disease) 2004    FSGS (focal segmental glomerulosclerosis)     with collapsing    Hyperlipidemia     Hypertension     Neuropathy     Obesity     Secondary hyperparathyroidism, renal     TIA (transient ischemic attack)     Uterine fibroid     small uterine        Past Surgical History:   Procedure Laterality Date    CARDIAC CATHETERIZATION      PCI x 2     SECTION, CLASSIC      COLONOSCOPY N/A 2018    Performed by Rodrigue Russell MD at [facility] ENDO (2ND FLR)    DEBRIDEMENT-WOUND Left 2016    Performed by Teressa Maddox MD at [facility] OR    DIALYSIS FISTULA CREATION      multiple fistulas and grafts before PD     EGD (ESOPHAGOGASTRODUODENOSCOPY) N/A 3/14/2019    Performed by Adolph Davila MD at [facility] ENDO    EGD (ESOPHAGOGASTRODUODENOSCOPY) N/A 3/13/2019    Performed by Adolph Davila MD at [facility] ENDO    INCISION AND DRAINAGE (I&D), LABIAL N/A 2016    Performed by Harmony Fernandez MD at [facility] OR    INCISION AND DRAINAGE (I&D), LABIAL (ADD ON) Left 2016    Performed by Teressa Maddox MD at [facility] OR    INCISION AND DRAINAGE OF WOUND Left     VULVAR ABCESS WITH NECROSIS    Insertion, Catheter, Central Venous, Hemodialysis N/A 3/18/2019    Performed by Kimo Weeks MD at [facility] CATH LAB    INSERTION, CATHETER, TUNNELED ABORTED Left 3/14/2019    Performed by Servando Solis MD at [facility] OR    ORIF, HIP Left  9/13/2018    Performed by Tevin Grullon MD at [facility] OR    PERITONEAL CATHETER INSERTION      PERMCAT REWIRE- TUNNELED CATH REWIRE Left 11/13/2017    Performed by Baldev Munroe MD at [facility] CATH LAB    PERMCATH REWIRE- TUNNELED CATH REWIRE N/A 10/5/2017    Performed by Baldev Munroe MD at [facility] CATH LAB    REMOVAL, CATHETER, DIALYSIS, PERITONEAL N/A 3/14/2019    Performed by Servando Solis MD at [facility] OR    UMBILICAL HERNIA REPAIR      Venogram, possible intervention Right 3/20/2019    Performed by BESSIE Wynn III, MD at [facility] CATH LAB       Time Tracking:     OT Date of Treatment: 03/24/19  OT Start Time: 1116  OT Stop Time: 1133  OT Total Time (min): 17 min    Billable Minutes:Re-eval 9  Self Care/Home Management 8    SABRA Zarate  3/24/2019

## 2019-03-25 NOTE — PLAN OF CARE
SW sent referral to [facility] for review via [employer]. SW will follow up.    3:29 PM  SW sent SNF referrals to 1. [facility], 2. [facility], and 3. [facility]. SW will follow up.    Dialysis chair time will need to be transferred from [facility] to the appropriate location for the SNF facility.     PASRR and LOCET completed. 142 in [employer].     Eufemia Anderson, SHIRLEY  [facility]

## 2019-03-25 NOTE — PLAN OF CARE
[facility]     Department of Hospital Medicine     [address] [place] [address]     [phone] after hours  [phone] fax       NURSING HOME ORDERS    03/25/2019    Admit to Nursing Home:     Skilled Bed      Diagnoses:  Active Hospital Problems    Diagnosis  POA    *ESRD (end stage renal disease) on PD ( initiated dialysis 04/20/2004) [N18.6]  Yes     Nightly PD      Pressure injury of left buttock, stage 2 [L89.322]  Yes    Delayed surgical wound healing [T81.89XA]  Yes    Chronic systolic heart failure [I50.22]  Yes    Acute gastric ulcer with hemorrhage [K25.0]  Yes    Antibiotic-associated diarrhea [K52.1, T36.95XA]  Yes    Hemodialysis catheter dysfunction [T82.41XA]  Yes    DVT (deep venous thrombosis) [I82.409]  Unknown    Peritonitis associated with peritoneal dialysis [T85.71XA]  Yes    ESRD (end stage renal disease) on dialysis [N18.6, Z99.2]  Not Applicable    Hypertension [I10]  Yes    Diabetes mellitus, type 2 [E11.9]  Yes    CAD (coronary artery disease) [I25.10]  Yes     Cath 12/27/2012:   RCA PCI 2.5 x 18 and 3.0 x 26 mm BMS   Patent LAD and LCX   Normal EF     Dr. Berrios for NSTEMI       DAPT score 3           Resolved Hospital Problems   No resolved problems to display.       Patient is homebound due to:  ESRD (end stage renal disease)    Allergies:  Review of patient's allergies indicates:   Allergen Reactions    Clindamycin Diarrhea    Flagyl [metronidazole hcl]     Metronidazole        Vitals:       Every shift (Skilled Nursing patients)    Diet: Renal diet   Supplement:  1 can every three times a day with meals                         Type: Nepro      Acitivities:   - Up in a chair each morning as tolerated   - Ambulate with assistance to bathroom   - Scheduled walks once each shift (every 8 hours)   - May ambulate independently   - May use walker, cane, or self-propelled wheelchair    LABS:  Per facility  protocol    Nursing Precautions:    - Aspiration precautions:             - Total assistance with meals            -  Upright 90 degrees befor during and after meals             -  Suction at bedside          - Fall precautions per nursing home protocol   - Seizure precaution per [facility] protocol   - Decubitus precautions:        -  for positioning   - Pressure reducing foam mattress   - Turn patient every two hours. Use wedge pillows to anchor patient      CONSULTS:   Physical Therapy to evaluate and treat   Occupational Therapy to evaluate and treat      WOUND CARE     Recommendations:  -Pressure injury prevention interventions to include waffle overlay   -Triad ointment BID and prn cleaning to L buttock  -Silver hydrofiber, Aquacel AG ribbon, 2x/wk to abdomen wounds        1. Incision/Site -  Left Abdomen  Care Cleansed with:;Sterile normal saline   Dressing Applied;Foam   Packing Incision packed with  (amd packing, ordered silver hydrofiber)      2. Incision/Site - Right Abdomen  Care Cleansed with:;Sterile normal saline   Dressing Applied;Foam   Packing Incision packed with  (amd packing, placed orders for silver hydrofiber)      3. Incision/Site - Abdomen lower  Care Cleansed with:;Sterile normal saline   Dressing Applied;Foam   Packing Incision packed with  (amd packing, ordered silver hydrofiber)         4. Pressure Injury 03/07/19 1325 Left Buttocks Stage 2  Care Cleansed with:;Sterile normal saline;Applied:;Skin Barrier  (Triad)            DIABETES CARE:     Check blood sugar:    Fingerstick blood sugar a.m and p.m.    Fingerstick blood sugar AC and HS   Fingerstick blood sugar every 6 hours if unable to eat      Report CBG < 60 or > 400 to physician.                                          Insulin Sliding Scale          Glucose  Novolog Insulin Subcutaneous        0 - 60   Orange juice or glucose tablet, hold insulin      No insulin   201-250  2 units   251-300  4 units   301-350  6  units   351-400  8 units   >400   10 units then call physician      Medications: Discontinue all previous medication orders, if any. See new list below.     Jenni Todd   Home Medication Instructions SHAWNA:[id number]    Printed on:03/25/19 8285   Medication Information                      aspirin (ECOTRIN) 81 MG EC tablet  Take 1 tablet (81 mg total) by mouth once daily.             atorvastatin (LIPITOR) 40 MG tablet  Take 40 mg by mouth every evening.              ceFEPIme (MAXIPIME) 1 gram injection  Patient need 1 g cefepime q24h until 3/28             epoetin adonay 10,000 unit/mL Soln 1 mL, epoetin adonay 20,000 unit/mL Soln 1 mL  Inject 30,000 Units into the vein every 14 (fourteen) days.             gabapentin (NEURONTIN) 100 MG capsule  1 capsule 3 (three) times daily.             nateglinide (STARLIX) 120 MG tablet  STARLIX 120MG 30 MINUTES BEFORE EACH MEAL.             ONETOUCH VERIO Strp  USE 1 STRIP ONCE DAILY IN VITRO             pantoprazole (PROTONIX) 40 MG tablet  Take 1 tablet (40 mg total) by mouth once daily.             sevelamer carbonate (RENVELA) 800 mg Tab  Take 1 tablet (800 mg total) by mouth 3 (three) times daily with meals.             Loperamide Capsule 2 mg TID PRN for diarrhea             vitamin renal formula, B-complex-vitamin c-folic acid, (B COMPLEX-C-FOLIC ACID) 1 mg Cap  Take 1 capsule by mouth once daily. 1 Capsule Oral Every day               Insulin Detemir 10 units qhs                         _________________________________  Guevara Metzger MD  03/25/2019

## 2019-03-25 NOTE — PROGRESS NOTES
[facility] Hospital Medicine                                                                     Progress Note     Team: [facility] HOSP MED A Guevara Metzger MD   Admit Date: 3/18/2019   Hospital Day: 7  JESSY:  3/24/2018  Code status: Full Code   Principal Problem: ESRD (end stage renal disease)     SUMMARY:     Jenni Todd is a 48 y.o. female w/ CAD s/p 2 PCI (2012), HFrEF 30%, HTN, DM, and ESRD on home PD admitted to [facility] on 2/21 for peritonitis. PD catheter removed. Due to hx of recurrent peritonitis she was transitioned to HD. Bilateral IJ tunneled catheters were attempted without success. Now has a temporary dialysis catheter in her left fem. Transferred to [facility] for vascular. Nephrology access team attempted a tunneled line and were unsuccessful.  Vascular surgery consulted for HD access. Hospital course complicated by GIB with low risk ulcer. S/p permacath 3/22.     SUBJECTIVE:     Pt was seen and examined at bedside. No complaints, or issues overnight. Remains stable. Medically ready for discharge, pending placement.     ROS (Positive in Bold, otherwise negative)  Pain Scale: 0 /10   Constitutional: fever, chills, night sweats  CV: chest pain, edema, palpitations  Resp: SOB, cough, sputum production  GI: changes in appetite, NVDC, pain, melena, hematochezia, GERD, hematemesis  : Dysuria, hematuria, urinary urgency, frequency  MSK: arthralgia/myalgia, joint swelling  SKIN: rashes, pruritis, petechiae   Neuro/Psych: FND, anxiety, depression      OBJECTIVE:     Vitals:  Temp:  [98 °F (36.7 °C)-98.9 °F (37.2 °C)]   Pulse:  [85-90]   Resp:  [16]   BP: ()/(57-69)   SpO2:  [98 %-100 %]      I & O (Last 24H):     Intake/Output Summary (Last 24 hours) at 3/25/2019 1147  Last data filed at 3/24/2019 2049  Gross per 24 hour   Intake 60 ml   Output --    Net 60 ml       GEN: in no acute distress. Nontoxic. Resting in bed. Cooperative.  HEENT: NCAT. PERRL. EOMI. Conjunctivae/corneas clear, sclera Anicteric.  CVS: RRR. Normal s1 s2 no murmur, click, rub or gallop  LUNG: CTAB. Normal respiratory effort. No wheezes, rhonchi, or crackles.  ABD: PD site noted. Normoactive BS, soft, NT, ND, no masses or organomegaly.  EXT: No edema. No cyanosis. Full ROM. Right arm access dry  SKIN: color, texture, turgor normal. No rashes or lesions  NEURO: Alert, oriented x 4, Spont mvt of all extremities with no focal deficits noted.      All recent labs and imaging has been reviewed.     Recent Results (from the past 24 hour(s))   POCT glucose    Collection Time: 03/24/19  5:05 PM   Result Value Ref Range    POCT Glucose 207 (H) 70 - 110 mg/dL   POCT glucose    Collection Time: 03/24/19  8:08 PM   Result Value Ref Range    POCT Glucose 210 (H) 70 - 110 mg/dL   Comprehensive Metabolic Panel (CMP)    Collection Time: 03/25/19  7:00 AM   Result Value Ref Range    Sodium 136 136 - 145 mmol/L    Potassium 4.3 3.5 - 5.1 mmol/L    Chloride 104 95 - 110 mmol/L    CO2 19 (L) 23 - 29 mmol/L    Glucose 160 (H) 70 - 110 mg/dL    BUN, Bld 35 (H) 6 - 20 mg/dL    Creatinine 8.8 (H) 0.5 - 1.4 mg/dL    Calcium 8.5 (L) 8.7 - 10.5 mg/dL    Total Protein 6.2 6.0 - 8.4 g/dL    Albumin 1.6 (L) 3.5 - 5.2 g/dL    Total Bilirubin 0.2 0.1 - 1.0 mg/dL    Alkaline Phosphatase 110 55 - 135 U/L    AST 12 10 - 40 U/L    ALT 7 (L) 10 - 44 U/L    Anion Gap 13 8 - 16 mmol/L    eGFR if African American 5.5 (A) >60 mL/min/1.73 m^2    eGFR if non  4.8 (A) >60 mL/min/1.73 m^2   Magnesium    Collection Time: 03/25/19  7:00 AM   Result Value Ref Range    Magnesium 2.1 1.6 - 2.6 mg/dL   Phosphorus    Collection Time: 03/25/19  7:00 AM   Result Value Ref Range    Phosphorus 5.5 (H) 2.7 - 4.5 mg/dL   CBC with Automated Differential    Collection Time: 03/25/19  7:00 AM   Result Value Ref Range    WBC 9.00 3.90  - 12.70 K/uL    RBC 2.94 (L) 4.00 - 5.40 M/uL    Hemoglobin 9.1 (L) 12.0 - 16.0 g/dL    Hematocrit 29.6 (L) 37.0 - 48.5 %     (H) 82 - 98 fL    MCH 31.0 27.0 - 31.0 pg    MCHC 30.7 (L) 32.0 - 36.0 g/dL    RDW 19.7 (H) 11.5 - 14.5 %    Platelets 224 150 - 350 K/uL    MPV 10.8 9.2 - 12.9 fL    Immature Granulocytes 0.6 (H) 0.0 - 0.5 %    Gran # (ANC) 5.4 1.8 - 7.7 K/uL    Immature Grans (Abs) 0.05 (H) 0.00 - 0.04 K/uL    Lymph # 2.7 1.0 - 4.8 K/uL    Mono # 0.7 0.3 - 1.0 K/uL    Eos # 0.1 0.0 - 0.5 K/uL    Baso # 0.04 0.00 - 0.20 K/uL    nRBC 0 0 /100 WBC    Gran% 60.0 38.0 - 73.0 %    Lymph% 29.6 18.0 - 48.0 %    Mono% 8.0 4.0 - 15.0 %    Eosinophil% 1.4 0.0 - 8.0 %    Basophil% 0.4 0.0 - 1.9 %    Differential Method Automated    Protime-INR    Collection Time: 03/25/19  7:00 AM   Result Value Ref Range    Prothrombin Time 10.6 9.0 - 12.5 sec    INR 1.0 0.8 - 1.2   POCT glucose    Collection Time: 03/25/19  7:58 AM   Result Value Ref Range    POCT Glucose 194 (H) 70 - 110 mg/dL       Recent Labs   Lab 03/23/19  1740 03/23/19  2108 03/24/19  0806 03/24/19  1705 03/24/19 2008 03/25/19  0758   POCTGLUCOSE 200* 209* 159* 207* 210* 194*       Hemoglobin A1C   Date Value Ref Range Status   09/12/2018 6.3 (H) 4.0 - 5.6 % Final     Comment:     ADA Screening Guidelines:  5.7-6.4%  Consistent with prediabetes  >or=6.5%  Consistent with diabetes  High levels of fetal hemoglobin interfere with the HbA1C  assay. Heterozygous hemoglobin variants (HbS, HgC, etc)do  not significantly interfere with this assay.   However, presence of multiple variants may affect accuracy.     11/30/2017 7.9 (H) 4.0 - 5.6 % Final     Comment:     According to ADA guidelines, hemoglobin A1c <7.0% represents  optimal control in non-pregnant diabetic patients. Different  metrics may apply to specific patient populations.   Standards of Medical Care in Diabetes-2016.  For the purpose of screening for the presence of diabetes:  <5.7%      Consistent with the absence of diabetes  5.7-6.4%  Consistent with increasing risk for diabetes   (prediabetes)  >or=6.5%  Consistent with diabetes  Currently, no consensus exists for use of hemoglobin A1c  for diagnosis of diabetes for children.  This Hemoglobin A1c assay has significant interference with fetal   hemoglobin   (HbF). The results are invalid for patients with abnormal amounts of   HbF,   including those with known Hereditary Persistence   of Fetal Hemoglobin. Heterozygous hemoglobin variants (HbAS, HbAC,   HbAD, HbAE, HbA2) do not significantly interfere with this assay;   however, presence of multiple variants in a sample may impact the %   interference.     02/22/2017 9.0 (H) 4.5 - 6.2 % Final     Comment:     According to ADA guidelines, hemoglobin A1C <7.0% represents  optimal control in non-pregnant diabetic patients.  Different  metrics may apply to specific populations.   Standards of Medical Care in Diabetes - 2016.  For the purpose of screening for the presence of diabetes:  <5.7%     Consistent with the absence of diabetes  5.7-6.4%  Consistent with increasing risk for diabetes   (prediabetes)  >or=6.5%  Consistent with diabetes  Currently no consensus exists for use of hemoglobin A1C  for diagnosis of diabetes for children.          Active Hospital Problems    Diagnosis  POA    *ESRD (end stage renal disease) on PD ( initiated dialysis 04/20/2004) [N18.6]  Yes     Nightly PD      Pressure injury of left buttock, stage 2 [L89.322]  Yes    Delayed surgical wound healing [T81.89XA]  Yes    Chronic systolic heart failure [I50.22]  Yes    Acute gastric ulcer with hemorrhage [K25.0]  Yes    Antibiotic-associated diarrhea [K52.1, T36.95XA]  Yes    Hemodialysis catheter dysfunction [T82.41XA]  Yes    DVT (deep venous thrombosis) [I82.409]  Unknown    Peritonitis associated with peritoneal dialysis [T85.71XA]  Yes    ESRD (end stage renal disease) on dialysis [N18.6, Z99.2]  Not  Applicable    Hypertension [I10]  Yes    Diabetes mellitus, type 2 [E11.9]  Yes    CAD (coronary artery disease) [I25.10]  Yes     Cath 12/27/2012:   RCA PCI 2.5 x 18 and 3.0 x 26 mm BMS   Patent LAD and LCX   Normal EF     Dr. Berrios for NSTEMI       DAPT score 3           Resolved Hospital Problems   No resolved problems to display.        ASSESSMENT AND PLAN:     ESRD on HD  Status post removal of PD catheter for concern regarding mutliple organsims growing in peritoneal fluid. Attempts at tunneled catheter in IJ x 2 during this admission. Left femoral trialysis placed.   - Nephrology following, ESRD mgmt as per nephro  - Has left femoral trialysis catheter  - No IVs in R arm  - S/p OR 3/22 per vascular for permanent cath    Peritonitis due to peritoneal dialysis catheter   - Plan is for cefepime until March 28th, renal dosing  - As per ID 2g cefepime once discharged after HD  - Wound care consulted for PD site wound care    CAD  - remote stents. Was on DAPT, continuing on Asa (due to bleed plavix held)  - holding BB    Acute gastric ulcer with hemorrhage  GIB  - no more bleeding, on PPI  - biopsy neg for malignancy or H.pylori    Pressure injury stage 2 on left buttock   - consulted wound care    Chronic systolic heart failure  - EF 30% on TTE, stable  - Hold antihypertensive due to low BP, resume as able, can be done OP    Diabetes mellitus, type 2  - A1c 6.3, was getting intra-peritoneal insulin. Catheter now removed.   - SSI for now     Debility  Generalized weakness  - PT OT to reassess  - PT rec SNF, referrals sent     Hypotension - resolved  Antibiotic-associated diarrhea - resolved, now soft stool - resolved  - Multiple episodes of hypotension suspecting from recurrent diarrhea, dialysis and maybe PVD  - Fluids prn and loperamide, continue to monitor   - Not on any antihypertensives at this time and HH stable  - Has been C diff negative.    - Barrier creams and antidiarrheals  ordered        Prophylaxis- SCDs for now    Code Status- Full Code     Discharge plan and follow up - pending PT OT for SNF, referrals sent to SNF    Guevara Metzger MD  Hospital Medicine Staff  Pager [phone]

## 2019-03-25 NOTE — PROGRESS NOTES
Pt compliant with medications and treatment plan. VSS. Bed in low position. Handrails up X3. Call light and personal belongings within reach. Bedside handoff report given to SINCERE Daily.

## 2019-03-25 NOTE — PLAN OF CARE
03/25/19 1112   Discharge Reassessment   Assessment Type Discharge Planning Reassessment   Provided patient/caregiver education on the expected discharge date and the discharge plan Yes  (Per MD)   Do you have any problems affording any of your prescribed medications? No   Discharge Plan A Skilled Nursing Facility   Discharge Plan B Home with family;Home Health   DME Needed Upon Discharge  bedside commode   Patient choice form signed by patient/caregiver N/A   Anticipated Discharge Disposition SNF   Can the patient answer the patient profile reliably? Yes, cognitively intact   How does the patient rate their overall health at the present time? Good     CM to the Bedside to see the patient at this time. The patient was notified that the CM team continues to follow for D/C needs and/or recommendations. D/C plan at this time is Short Stay SNF. CM name and number remain on the board at the Bedside for the patient to call with any D/C needs or questions. Understanding verbalized.

## 2019-03-26 PROBLEM — D64.89 OTHER SPECIFIED ANEMIAS: Status: ACTIVE | Noted: 2019-01-01

## 2019-03-26 NOTE — CONSULTS
Vascular surgery already consulted on this patient this admission.  Evaluated patient in dialysis.  No thrill or bruit in RUE AVG.  Will obtain US of HD access and follow up result.      Rodrigue Echeverria M.D.  General Surgery PGY3  [phone]

## 2019-03-26 NOTE — PLAN OF CARE
Problem: Skin Injury Risk Increased  Goal: Skin Health and Integrity  Outcome: Ongoing (interventions implemented as appropriate)  Patient has a mepilix dressing intact over stage 2 buttock ulcer. Triad cream applied to buttock prn. No diarrhea stools this shift.

## 2019-03-26 NOTE — PLAN OF CARE
KE[facility] contacted [facility] about pt referral.  Ruby with the facility will contact the SW.  Pt also in need of HD chair placement upon acceptance at SNF.             Tavares Aldridge, MSW, LCSW  [facility]  [id number]

## 2019-03-26 NOTE — PT/OT/SLP PROGRESS
Physical Therapy      Patient Name:  Jenni Todd   MRN:  [id number]    Patient not seen today secondary to  Off floor for procedure.  Attempted to visit pt for treatment session but pt off floor for dialysis.  Writing PT unable to return later in day.  Will follow-up next scheduled date.    Adolph Zayas, PT   3/26/2019

## 2019-03-26 NOTE — SUBJECTIVE & OBJECTIVE
Interval History:   Symptoms:  Same.  Diet:  Adequate intake.   Activity level:   Returning to normal.  Pain:  No pain.       Review of Systems   Constitutional: Negative for fever.   Respiratory: Negative for shortness of breath.    Cardiovascular: Negative for chest pain.   Gastrointestinal: Negative for abdominal pain.     Objective:     Vital Signs (Most Recent):  Temp: 98.5 °F (36.9 °C) (03/26/19 1624)  Pulse: 98 (03/26/19 1624)  Resp: 17 (03/26/19 1624)  BP: 102/66 (03/26/19 1624)  SpO2: 98 % (03/26/19 1624) Vital Signs (24h Range):  Temp:  [98.4 °F (36.9 °C)-99.1 °F (37.3 °C)] 98.5 °F (36.9 °C)  Pulse:  [] 98  Resp:  [16-18] 17  SpO2:  [97 %-100 %] 98 %  BP: ()/(36-81) 102/66     Weight: 93.1 kg (205 lb 4 oz)  Body mass index is 32.15 kg/m².    Intake/Output Summary (Last 24 hours) at 3/26/2019 1849  Last data filed at 3/26/2019 1408  Gross per 24 hour   Intake 150 ml   Output 1650 ml   Net -1500 ml      Physical Exam   Constitutional: No distress.   Neck: No JVD present.   Cardiovascular: Normal rate, regular rhythm and normal heart sounds.   Pulmonary/Chest: Effort normal and breath sounds normal. No respiratory distress.   Abdominal: Soft. Bowel sounds are normal. She exhibits no distension.   Musculoskeletal: She exhibits no edema.   Neurological: She is alert.   Psychiatric: She has a normal mood and affect.       Significant Labs:  CBC:  Recent Labs   Lab 03/25/19  0700 03/26/19  0330   WBC 9.00 8.81   HGB 9.1* 9.1*   HCT 29.6* 29.1*    230     CMP:  Recent Labs   Lab 03/25/19  0700 03/26/19  0330    135*   K 4.3 4.3    102   CO2 19* 19*   * 136*   BUN 35* 44*   CREATININE 8.8* 10.2*   CALCIUM 8.5* 8.5*   PROT 6.2 6.2   ALBUMIN 1.6* 1.6*   BILITOT 0.2 0.2   ALKPHOS 110 115   AST 12 10   ALT 7* 8*   ANIONGAP 13 14   EGFRNONAA 4.8* 4.0*

## 2019-03-26 NOTE — PROGRESS NOTES
Arrived to dialysis via stretcher. AAO x 4. Needs assist  In transfer from stretcher to bed. Agreed to 3.5 hour HD Tx. No thrill or bruit to rt. Arm AVG. Vascular notified. Lt, femoral  Cath arterial lumen aspirate and flush. Venous lumen does  Not aspirate but flush.

## 2019-03-26 NOTE — PROGRESS NOTES
[facility] Hospital Medicine  Progress Note    Patient Name: Jenni Todd  MRN: [id number]  Patient Class: IP- Inpatient   Admission Date: 3/18/2019  Length of Stay: 8 days  Attending Physician: Sung Sr MD  Primary Care Provider: Michael Tan Iii, MD    [facility] Medicine Team: [facility]OSP MED A Sung Sr MD    Subjective:     Principal Problem:ESRD (end stage renal disease)    HPI:  Jenni Todd is a 48 y.o. female w/ CAD s/p 2 PCI (2012), HFrEF 30%, HTN, DM, and ESRD on home PD admitted to [facility] on 2/21 for peritonitis. PD catheter removed. Due to hx of recurrent peritonitis she was transitioned to HD.         Hospital Course:  Bilateral IJ tunneled catheters were attempted without success. Now has a temporary dialysis catheter in her left fem. Transferred to [facility] for vascular. Nephrology access team attempted a tunneled line and were unsuccessful.  Vascular surgery consulted for HD access. Hospital course complicated by GIB with low risk ulcer. S/p RUE Graft 3/22.     Interval History:   Symptoms:  Same.  Diet:  Adequate intake.   Activity level:   Returning to normal.  Pain:  No pain.       Review of Systems   Constitutional: Negative for fever.   Respiratory: Negative for shortness of breath.    Cardiovascular: Negative for chest pain.   Gastrointestinal: Negative for abdominal pain.     Objective:     Vital Signs (Most Recent):  Temp: 98.5 °F (36.9 °C) (03/26/19 1624)  Pulse: 98 (03/26/19 1624)  Resp: 17 (03/26/19 1624)  BP: 102/66 (03/26/19 1624)  SpO2: 98 % (03/26/19 1624) Vital Signs (24h Range):  Temp:  [98.4 °F (36.9 °C)-99.1 °F (37.3 °C)] 98.5 °F (36.9 °C)  Pulse:  [] 98  Resp:  [16-18] 17  SpO2:  [97 %-100 %] 98 %  BP: ()/(36-81) 102/66     Weight: 93.1 kg (205 lb 4 oz)  Body mass index is 32.15 kg/m².    Intake/Output Summary (Last 24 hours) at 3/26/2019 7252  Last data filed at 3/26/2019 1408  Gross per 24 hour   Intake 150 ml   Output  1650 ml   Net -1500 ml      Physical Exam   Constitutional: No distress.   Neck: No JVD present.   Cardiovascular: Normal rate, regular rhythm and normal heart sounds.   Pulmonary/Chest: Effort normal and breath sounds normal. No respiratory distress.   Abdominal: Soft. Bowel sounds are normal. She exhibits no distension.   Musculoskeletal: She exhibits no edema.   Neurological: She is alert.   Psychiatric: She has a normal mood and affect.       Significant Labs:  CBC:  Recent Labs   Lab 03/25/19  0700 03/26/19  0330   WBC 9.00 8.81   HGB 9.1* 9.1*   HCT 29.6* 29.1*    230     CMP:  Recent Labs   Lab 03/25/19  0700 03/26/19  0330    135*   K 4.3 4.3    102   CO2 19* 19*   * 136*   BUN 35* 44*   CREATININE 8.8* 10.2*   CALCIUM 8.5* 8.5*   PROT 6.2 6.2   ALBUMIN 1.6* 1.6*   BILITOT 0.2 0.2   ALKPHOS 110 115   AST 12 10   ALT 7* 8*   ANIONGAP 13 14   EGFRNONAA 4.8* 4.0*         Assessment/Plan:      * ESRD (end stage renal disease) on PD ( initiated dialysis 04/20/2004)  Now on Hemodialysis.  Status post removal of PD catheter for concern regarding mutliple organsims growing in peritoneal fluid. Attempts at tunneled catheter in IJ x 2 during this admission.   Left femoral trialysis placed. S/p OR 3/22 per vascular for RUE graft (Arsen)  - Nephrology following, ESRD mgmt as per nephro  - No IVs in R arm  - RUQ may not be working; US ordered by surgery     Peritonitis associated with peritoneal dialysis  - Plan is for cefepime until March 28th, renal dosing  - Wound care consulted for PD site wound care        Acute gastric ulcer with hemorrhage  no more bleeding, biopsy neg for malignancy or H.pylori  - on PPI        Chronic systolic heart failure  Chronic systolic heart failure  Hypertension with hypotension  EF 30% on TTE, stable  - Hold antihypertensive due to low BP, resume as able, can be done OP      Renovascular hypertension  Hypotension - resolved  Antibiotic-associated diarrhea -  resolved, now soft stool - resolved.  Has been C diff negative.    Multiple episodes of hypotension suspecting from recurrent diarrhea, dialysis and maybe PVD  - Fluids prn and loperamide, continue to monitor   - Not on any antihypertensives at this time and HH stable  - Barrier creams and antidiarrheals ordered         Other specified anemias  -- chronic and stable; continue with home treatments & monitor       Pressure injury of left buttock, stage 2  - consulted wound care      CAD (coronary artery disease)  - remote stents. Was on DAPT, continuing on Asa (due to bleed plavix held)  - holding BB      Type 2 diabetes 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, with long-term current use of insulin  A1c 6.3, was getting intra-peritoneal insulin. Catheter now removed.   - SSI for now         VTE Risk Mitigation (From admission, onward)        Ordered     heparin (porcine) injection 1,000 Units  As needed (PRN)      03/26/19 1415     heparin (porcine) injection 500 Units  As needed (PRN)      03/26/19 0842     heparin (porcine) injection 2,000 Units  As needed (PRN)      03/21/19 1219     heparin infusion 1,000 units/500 ml in 0.9% NaCl (pressure line flush)  Intra-op continuous PRN      03/18/19 1016     IP VTE LOW RISK PATIENT  Once      03/18/19 0212     Place sequential compression device  Until discontinued      03/18/19 0212              Sung Sr MD  Department of Hospital Medicine   [facility]

## 2019-03-26 NOTE — PROGRESS NOTES
[facility] NOTE     Patient currently on hemodialysis for removal of uremic toxins and volume.     Patient seen and evaluated on hemodialysis, tolerating treatment, see HD flowsheet for vitals and assessments.    No Hypotension, chest pain, shortness of breath, cramping, nausea or vomiting.       Ultrafiltration goal is 1 L as tolerated, keep MAP >65.      Labs have been reviewed and the dialysate bath has been adjusted.     Assessment/Plan:  -Issues this morning w/ AVG (s/p AVF placement 3/22). No thrill or bruit noted to graft. According to notes, unable to dialyze via AVG on Saturday due to same issue. Assessment confirmed by dialysis CN.   -Team made aware, vascular re-consulted. US of HD access ordered - will follow.   -Dialyze via temporary femoral catheter after Cathflo   -Tight protocol used for anticoagulation, no clotting thus far   -Patient tolerating session with current UFR once started    -Will continue dialysis treatments while in-patient    Anemia of ESRD  -Continue BUSHRA with dialysis treatments  -Hgb 9.1  -Will consider Epogen     BMM  -Renal diet w/ 1 L fluid restriction   -Novasource with meals  -Phos 5.4, continue binders   -Daily renal function panel       Chelsie Beasley, URSULA, APRN, FNP-C  Nephrology Department  Pager:  [phone]

## 2019-03-26 NOTE — PLAN OF CARE
Patient RYAN in HD at this time. Spoke with the patient's sister via phone who is aware that the CM team continues to follow for D/C needs and/or recommendations. D/C plan is SNF with New HD chair placement. The patient's sister is aware that there are 2 SNF choices still pending at this time. CM name and number remain on the board at the Bedside for the patient or family to call with any D/C needs or questions. Understanding verbalized.

## 2019-03-26 NOTE — PROGRESS NOTES
Tx complete. Tolerated. NAD noted. Removed 1L. Lt.  Femoral  CVC flushed with NS,locked with heparin and capped

## 2019-03-26 NOTE — PROGRESS NOTES
HD initiated.. No flow on  either lumen. Cath fanny inserted Rt. Femoral cath lumens. Will sit for 1 hour.

## 2019-03-26 NOTE — HPI
Jenni Todd is a 48 y.o. female w/ CAD s/p 2 PCI (2012), HFrEF 30%, HTN, DM, and ESRD on home PD [facility] 2/[facility] for peritonitis. PD catheter removed. Due to hx of recurrent peritonitis she was transitioned to HD.

## 2019-03-27 NOTE — PROGRESS NOTES
Wound care follow up:  Wounds to abdomen continue to remain open and draining with increased depth to lower abdomen wound.  Left buttock wound healed with new Stage2 to right buttock.  Discussed wound care orders in place with Nurse Lolly who states she will change dressing today as directed with MD orders.  Supplies orders.  Notified Unit director, Doreen Velazquez RN.   Wound care to follow prn  [id number]       03/27/19 1414        Incision/Site 03/14/19 1445 Left Abdomen   Date First Assessed/Time First Assessed: 03/14/19 1445   Side: Left  Location: Abdomen   Wound Image    Wound Length (cm) 0.5 cm   Wound Width (cm) 2 cm   Wound Depth (cm) 1.3 cm   Wound Volume (cm^3) 1.3 cm^3   Wound Surface Area (cm^2) 1 cm^2   Care Cleansed with:;Sterile normal saline   Dressing Changed  (amd packing, Aquacel AG)   Dressing Change Due 03/27/19        Incision/Site 03/19/19 Right Abdomen   Date First Assessed: 03/19/19   Side: Right  Location: Abdomen   Wound Image    Incision WDL ex   Dressing Appearance Moist drainage   Drainage Amount Small   Drainage Characteristics/Odor Serosanguineous   Appearance Pink;Red;Slough   Red (%), Wound Tissue Color 90 %   Yellow (%), Wound Tissue Color 10 %   Periwound Area Intact   Wound Edges Open   Wound Length (cm) 0.5 cm   Wound Width (cm) 1.5 cm   Wound Depth (cm) 2 cm   Wound Volume (cm^3) 1.5 cm^3   Wound Surface Area (cm^2) 0.75 cm^2   Care Cleansed with:;Sterile normal saline   Dressing Changed  (amd packing, ordered Aquacel AG)   Dressing Change Due 03/27/19        Incision/Site 03/19/19 Abdomen lower   Date First Assessed: 03/19/19   Location: Abdomen  Orientation: lower   Wound Image    Incision WDL ex   Dressing Appearance Intact   Drainage Amount Small   Drainage Characteristics/Odor Serosanguineous   Appearance Pink;Red;Slough  (unable to visualize base)   Periwound Area Intact   Wound Edges Open   Wound Length (cm) 0.3 cm   Wound Width (cm) 3.7 cm   Wound Depth (cm) 4 cm    Wound Volume (cm^3) 4.44 cm^3   Wound Surface Area (cm^2) 1.11 cm^2   Care Cleansed with:;Sterile normal saline   Dressing Changed  (amd packing, ordered Aquacel AG)   Dressing Change Due 03/27/19        Pressure Injury 03/27/19 Right Buttocks Stage 2   Date First Assessed: 03/27/19   Pressure Injury Present on Admission: suspected hospital acquired  Side: Right  Location: Buttocks  Staging: Stage 2   Wound Image    Staging Stage 2   Dressing Appearance Intact   Drainage Amount Scant   Drainage Characteristics/Odor Clear   Appearance Red;Pink   Tissue loss description Partial thickness   Red (%), Wound Tissue Color 100 %   Periwound Area Intact   Wound Edges Open   Wound Length (cm) 1 cm   Wound Width (cm) 0.5 cm   Wound Depth (cm) 0.1 cm   Wound Volume (cm^3) 0.05 cm^3   Wound Surface Area (cm^2) 0.5 cm^2   Care Cleansed with:;Sterile normal saline;Skin Barrier;Other (see comments)  (Triad)   Dressing Change Due 03/27/19

## 2019-03-27 NOTE — PLAN OF CARE
[facility] currently following pt.  [facility] will not have any beds available until Monday.  SW will send referrals to other skilled facilities.          Tavares Aldridge, MSW, [facility]  [facility]  [id number]

## 2019-03-27 NOTE — PLAN OF CARE
Problem: Occupational Therapy Goal  Goal: Occupational Therapy Goal  Goals to be met by: 3/31/19     Patient will increase functional independence with ADLs by performing:    UE Dressing with Supervision.  LE Dressing with Moderate Assistance.  Grooming while standing at sink with Stand-by Assistance.  Toileting from toilet with Supervision for hygiene and clothing management.   Supine to sit with Minimal Assistance.  Step transfer with Stand-by Assistance  Toilet transfer to toilet with Minimal Assistance. Progressing        Outcome: Ongoing (interventions implemented as appropriate)  Goals are still appropriate, continue w/ OT POC.

## 2019-03-27 NOTE — PLAN OF CARE
Problem: Physical Therapy Goal  Goal: Physical Therapy Goal  Goals to be met by: 2019     Patient will increase functional independence with mobility by performin. Supine to sit with Modified [place]  2. Sit to supine with Modified [place]  3. Sit to stand transfer with Modified [place]  4. Gait  x 150 feet with Supervision using Rolling Walker.   5. Ascend/Descend 4 inch curb step with Supervision using Rolling Walker.     Outcome: Ongoing (interventions implemented as appropriate)  Progressing towards goals    Adolph Zayas PT,DPT  3/27/2019

## 2019-03-27 NOTE — PLAN OF CARE
Problem: Adult Inpatient Plan of Care  Goal: Plan of Care Review  Outcome: Ongoing (interventions implemented as appropriate)  Report from Melba JAIN. Noted patient in bed awake, alert, and oriented. Denies pain and dyspnea. Bed in lowest position and call light within reach. Board updated. Patient encouraged to notify staff when she needs assistance. Denies questions. Assessments per flow sheets. Will continue to monitor.

## 2019-03-27 NOTE — PT/OT/SLP PROGRESS
"Occupational Therapy   Treatment    Name: Jenni Todd  MRN: [id number]  Admitting Diagnosis:  ESRD (end stage renal disease)  5 Days Post-Op    Recommendations:     Discharge Recommendations: nursing facility, skilled  Discharge Equipment Recommendations:  commode  Barriers to discharge:  Decreased caregiver support    Assessment:     Jenni Todd is a 48 y.o. female with a medical diagnosis of ESRD (end stage renal disease).  She presents with weakness in BLE, poor dynamic balance, and fear of falling which limits her occupational performance. Her performance is near baseline, but Pt had Low BP (87/61) during our session.  Performance deficits affecting function are weakness, impaired endurance, gait instability, impaired functional mobilty, impaired self care skills, impaired sensation, impaired balance, decreased safety awareness, decreased lower extremity function, impaired coordination, impaired cardiopulmonary response to activity.     Rehab Prognosis:  Fair; patient would benefit from acute skilled OT services to address these deficits and reach maximum level of function.       Plan:     Patient to be seen 3 x/week to address the above listed problems via self-care/home management, therapeutic activities, therapeutic exercises  · Plan of Care Expires: 04/23/19  · Plan of Care Reviewed with: patient    Subjective   Pt Reported: "Since I had a fall when walking to the stretcher I don't feel as stable or able to move around in the room. I'm afraid I'm going to fall again."  Pain/Comfort:  · Pain Rating 1: (Pain in rear not rated)    Objective:     Communicated with: RN prior to session.  Patient found HOB elevated with peripheral IV, telemetry upon OT entry to room.    General Precautions: Standard, fall   Orthopedic Precautions:N/A   Braces: N/A     Occupational Performance:     Bed Mobility:    · Patient completed Rolling/Turning to Left with  independence  · Patient completed Rolling/Turning to " Right with independence  · Patient completed Scooting/Bridging with independence  · Patient completed Supine to Sit with contact guard assistance  · Patient completed Sit to Supine with contact guard assistance     Functional Mobility/Transfers:  · Patient completed Sit <> Stand Transfer with minimum assistance  with  hand-held assist   · Patient completed Toilet Transfer Stand Pivot technique with stand by assistance with  drop arm commode at bedside.  · Functional Mobility: Able to take steps forward, backward, and side to side took 4 steps to HOB w/ CGA for balance.     Activities of Daily Living:  · Upper Body Dressing: stand by assistance seated at EOB w/ setup to don gown as jacket.   · Toileting: modified independence seated on BSC with setup.       [facility] 6 Click ADL: 21    Treatment & Education:  -Pt edu on OT role/POC  -Importance of OOB activity with staff assistance  -Safety during functional t/f and mobility  - White board updated  - Multiple self care tasks/functional mobility completed-- assistance level noted above  - All questions/concerns answered within OT scope of practice    Pt's BP was 87/61 while seated during the middle of our session.  Pt was educated on the effects of low BP and toileting was modified to allow for safer method using a scoot t/f and the drop arm commode at the bedside.    Patient left HOB elevated with all lines intact, call button in reach, RN notified and Daughter presentEducation:  .    GOALS:   Multidisciplinary Problems     Occupational Therapy Goals        Problem: Occupational Therapy Goal    Goal Priority Disciplines Outcome Interventions   Occupational Therapy Goal     OT, PT/OT Ongoing (interventions implemented as appropriate)    Description:  Goals to be met by: 3/31/19     Patient will increase functional independence with ADLs by performing:    UE Dressing with Supervision.  LE Dressing with Moderate Assistance.  Grooming while standing at sink with Stand-by  Assistance.  Toileting from toilet with Supervision for hygiene and clothing management.   Supine to sit with Minimal Assistance.  Step transfer with Stand-by Assistance  Toilet transfer to toilet with Minimal Assistance. Progressing                          Time Tracking:     OT Date of Treatment: 03/27/19  OT Start Time: 1539  OT Stop Time: 1619  OT Total Time (min): 40 min    Billable Minutes:Self Care/Home Management 15 mins  Therapeutic Activity 25 mins    Lexx Shore, OT  3/27/2019

## 2019-03-27 NOTE — PT/OT/SLP PROGRESS
Physical Therapy Treatment    Patient Name:  Jenni Todd   MRN:  [id number]    Recommendations:     Discharge Recommendations:  nursing facility, skilled   Discharge Equipment Recommendations: commode   Barriers to discharge: Inaccessible home and Decreased caregiver support    Assessment:     Jenni Todd is a 48 y.o. female admitted with a medical diagnosis of ESRD (end stage renal disease).  She presents with the following impairments/functional limitations:  weakness, impaired functional mobilty, gait instability, impaired endurance, impaired balance, impaired self care skills, decreased lower extremity function, decreased upper extremity function, pain, impaired cardiopulmonary response to activity.  Pt tolerated session c mod c/o fatigue and BLE weakness.  Pt performed bed mobility c SBA-mod A, transfer c CGA-min A and gait c CGA using RW.  Pt requiring prolonged seated rest break following gait training d/t c/o fatigue and weakness.  Further activity limited by pt's need to used BSC for BM.  Pt ambulating 40ft in room on this date and demo decreased gait speed, steady gait, flat foot contact, mild FFP, narrow SACHIN, labored breathing, c/o fatigue and BLE weakness. Pt safe to ambulate in room c assistance of 1x person and RW.  RW ordered for use in room.  Pt educated on requesting assistance from RN/PCT to ambulate in room throughout day to improve mobility.  Pt would benefit from continued skilled acute PT 3x/wk to improve functional mobility.      Rehab Prognosis: Good; patient would benefit from acute skilled PT services to address these deficits and reach maximum level of function.    Recent Surgery: Procedure(s) (LRB):  INSERTION, GRAFT, ARTERIOVENOUS, RIGHT ARM (Right) 5 Days Post-Op    Plan:     During this hospitalization, patient to be seen 3 x/week to address the identified rehab impairments via gait training, therapeutic exercises, neuromuscular re-education, therapeutic activities and  "progress toward the following goals:    · Plan of Care Expires:  04/16/19    Subjective     Chief Complaint: fatigue; weakness  Patient/Family Comments/goals: "I'm usually cold but I'm so hot right now." - following gait training  Pain/Comfort:  · Pain Rating 1: (reports coccyx pain but did not rate)      Objective:     Communicated with RN prior to session.  Patient found left sidelying with peripheral IV, telemetry upon PT entry to room.     General Precautions: Standard, fall   Orthopedic Precautions:N/A   Braces: N/A     Functional Mobility:  · Bed Mobility:     · Rolling Right: stand by assistance  · Scooting: stand by assistance  · Supine to Sit: moderate assistance  · Transfers:     · Sit to Stand:  minimum assistance with rolling walker from bed in lowest position  · 4x - required cues for proper hand placement  · Bed to BSC: contact guard assistance with  rolling walker  using  Step Transfer  · Gait: 40ft c RW CGA   · demo decreased gait speed, steady gait, flat foot contact, mild FFP, narrow SACHIN, labored breathing, c/o fatigue and BLE weakness  · Balance: sitting (S); standing (CGA)      AM-PAC 6 CLICK MOBILITY  Turning over in bed (including adjusting bedclothes, sheets and blankets)?: 3  Sitting down on and standing up from a chair with arms (e.g., wheelchair, bedside commode, etc.): 3  Moving from lying on back to sitting on the side of the bed?: 2  Moving to and from a bed to a chair (including a wheelchair)?: 3  Need to walk in hospital room?: 3  Climbing 3-5 steps with a railing?: 2  Basic Mobility Total Score: 16       Therapeutic Activities and Exercises:  Pt educated on: PT role/POC; safety c mobility; benefits of OOB activities; performing therex; d/c recs - v/u  -Sat EOB x5mins    Patient left on BSC with all lines intact, call button in reach and RN notified..    GOALS:   Multidisciplinary Problems     Physical Therapy Goals        Problem: Physical Therapy Goal    Goal Priority Disciplines " Outcome Goal Variances Interventions   Physical Therapy Goal     PT, PT/OT Ongoing (interventions implemented as appropriate)     Description:  Goals to be met by: 2019     Patient will increase functional independence with mobility by performin. Supine to sit with Modified [place]  2. Sit to supine with Modified [place]  3. Sit to stand transfer with Modified [place]  4. Gait  x 150 feet with Supervision using Rolling Walker.   5. Ascend/Descend 4 inch curb step with Supervision using Rolling Walker.                       Time Tracking:     PT Received On: 19  PT Start Time: 923     PT Stop Time: 937  PT Total Time (min): 14 min     Billable Minutes: Gait Training 10 min    Treatment Type: Treatment  PT/PTA: PT           Adolph Zayas, PT  2019

## 2019-03-27 NOTE — PLAN OF CARE
03/27/19 1446   Discharge Reassessment   Assessment Type Discharge Planning Reassessment   Provided patient/caregiver education on the expected discharge date and the discharge plan Yes   Do you have any problems affording any of your prescribed medications? No   Discharge Plan A Skilled Nursing Facility   Discharge Plan B Home with family;Home Health   DME Needed Upon Discharge  other (see comments)  (TBD)   Patient choice form signed by patient/caregiver N/A   Anticipated Discharge Disposition SNF   Can the patient answer the patient profile reliably? Yes, cognitively intact   How does the patient rate their overall health at the present time? Good     CM met with the patient at the Bedside. Family present at the Bedside at time of visit. The patient was notified that the CM team continues to follow for D/C needs and/or recommendations. D/C plan at this time is SNF with New HD chair setup once a Perm Cath is placed. CM name and number remain on the board at the Bedside for the patient or family to call with D/C needs or questions. Understanding verbalized.

## 2019-03-27 NOTE — SUBJECTIVE & OBJECTIVE
Interval History:   Symptoms:  Same.  Diet:  Adequate intake.   Activity level:   Returning to normal.  Pain:  No pain.       Review of Systems   Constitutional: Negative for fever.   Respiratory: Negative for shortness of breath.    Cardiovascular: Negative for chest pain.   Gastrointestinal: Negative for abdominal pain.     Objective:     Vital Signs (Most Recent):  Temp: 99 °F (37.2 °C) (03/27/19 1221)  Pulse: 88 (03/27/19 1221)  Resp: 18 (03/27/19 1221)  BP: (!) 90/36 (03/27/19 1221)  SpO2: 100 % (03/27/19 1221) Vital Signs (24h Range):  Temp:  [98.4 °F (36.9 °C)-99.5 °F (37.5 °C)] 99 °F (37.2 °C)  Pulse:  [] 88  Resp:  [16-18] 18  SpO2:  [93 %-100 %] 100 %  BP: ()/(36-73) 90/36     Weight: 93.1 kg (205 lb 4 oz)  Body mass index is 32.15 kg/m².    Intake/Output Summary (Last 24 hours) at 3/27/2019 1225  Last data filed at 3/26/2019 1830  Gross per 24 hour   Intake 390 ml   Output 1650 ml   Net -1260 ml      Physical Exam   Constitutional: No distress.   Neck: No JVD present.   Cardiovascular: Normal rate, regular rhythm and normal heart sounds.   Pulmonary/Chest: Effort normal and breath sounds normal. No respiratory distress.   Abdominal: Soft. Bowel sounds are normal. She exhibits no distension.   Musculoskeletal: She exhibits no edema.   Neurological: She is alert.   Psychiatric: She has a normal mood and affect.       Significant Labs:  CBC:  Recent Labs   Lab 03/26/19  0330 03/27/19  0541   WBC 8.81 9.61   HGB 9.1* 8.7*   HCT 29.1* 27.7*    235     CMP:  Recent Labs   Lab 03/26/19  0330 03/27/19  0541   * 136   K 4.3 4.5    99   CO2 19* 21*   * 125*   BUN 44* 37*   CREATININE 10.2* 9.1*   CALCIUM 8.5* 9.1   PROT 6.2 6.6   ALBUMIN 1.6* 1.6*   BILITOT 0.2 0.2   ALKPHOS 115 106   AST 10 11   ALT 8* 9*   ANIONGAP 14 16   EGFRNONAA 4.0* 4.6*

## 2019-03-27 NOTE — PROGRESS NOTES
[facility] Hospital Medicine  Progress Note    Patient Name: Jenni Todd  MRN: [id number]  Patient Class: IP- Inpatient   Admission Date: 3/18/2019  Length of Stay: 9 days  Attending Physician: Sung Sr MD  Primary Care Provider: Michael Tan Iii, MD    [facility] Medicine Team: [facility] MED A Sung Sr MD    Subjective:     Principal Problem:ESRD (end stage renal disease)    HPI:  Jenni Todd is a 48 y.o. female w/ CAD s/p 2 PCI (2012), HFrEF 30%, HTN, DM, and ESRD on home PD admitted to [facility] on 2/21 for peritonitis. PD catheter removed. Due to hx of recurrent peritonitis she was transitioned to HD.         Hospital Course:  Bilateral IJ tunneled catheters were attempted without success. Now has a temporary dialysis catheter in her left fem. Transferred to [facility] for vascular. Nephrology access team attempted a tunneled line and were unsuccessful.  Vascular surgery consulted for HD access. Hospital course complicated by GIB with low risk ulcer. S/p RUE Graft 3/22.     Interval History:   Symptoms:  Same.  Diet:  Adequate intake.   Activity level:   Returning to normal.  Pain:  No pain.       Review of Systems   Constitutional: Negative for fever.   Respiratory: Negative for shortness of breath.    Cardiovascular: Negative for chest pain.   Gastrointestinal: Negative for abdominal pain.     Objective:     Vital Signs (Most Recent):  Temp: 99 °F (37.2 °C) (03/27/19 1221)  Pulse: 88 (03/27/19 1221)  Resp: 18 (03/27/19 1221)  BP: (!) 90/36 (03/27/19 1221)  SpO2: 100 % (03/27/19 1221) Vital Signs (24h Range):  Temp:  [98.4 °F (36.9 °C)-99.5 °F (37.5 °C)] 99 °F (37.2 °C)  Pulse:  [] 88  Resp:  [16-18] 18  SpO2:  [93 %-100 %] 100 %  BP: ()/(36-73) 90/36     Weight: 93.1 kg (205 lb 4 oz)  Body mass index is 32.15 kg/m².    Intake/Output Summary (Last 24 hours) at 3/27/2019 1225  Last data filed at 3/26/2019 1830  Gross per 24 hour   Intake 390 ml   Output  1650 ml   Net -1260 ml      Physical Exam   Constitutional: No distress.   Neck: No JVD present.   Cardiovascular: Normal rate, regular rhythm and normal heart sounds.   Pulmonary/Chest: Effort normal and breath sounds normal. No respiratory distress.   Abdominal: Soft. Bowel sounds are normal. She exhibits no distension.   Musculoskeletal: She exhibits no edema.   Neurological: She is alert.   Psychiatric: She has a normal mood and affect.       Significant Labs:  CBC:  Recent Labs   Lab 03/26/19  0330 03/27/19  0541   WBC 8.81 9.61   HGB 9.1* 8.7*   HCT 29.1* 27.7*    235     CMP:  Recent Labs   Lab 03/26/19  0330 03/27/19  0541   * 136   K 4.3 4.5    99   CO2 19* 21*   * 125*   BUN 44* 37*   CREATININE 10.2* 9.1*   CALCIUM 8.5* 9.1   PROT 6.2 6.6   ALBUMIN 1.6* 1.6*   BILITOT 0.2 0.2   ALKPHOS 115 106   AST 10 11   ALT 8* 9*   ANIONGAP 14 16   EGFRNONAA 4.0* 4.6*         Assessment/Plan:      * ESRD (end stage renal disease) on PD ( initiated dialysis 04/20/2004)  Now on Hemodialysis.  Status post removal of PD catheter for concern regarding mutliple organsims growing in peritoneal fluid. Attempts at tunneled catheter in IJ x 2 during this admission.   Left femoral trialysis placed. S/p OR 3/22 per vascular for RUE graft (Arsen)  - Nephrology following, ESRD mgmt as per nephro  - No IVs in R arm  - RUQ graft is clotted per US.  Surgery wish to fix it as an outpt.  They asked for ALONZO to place permcath     Peritonitis associated with peritoneal dialysis  - Plan is for cefepime until March 28th, renal dosing  - Wound care consulted for PD site wound care        Acute gastric ulcer with hemorrhage  no more bleeding, biopsy neg for malignancy or H.pylori  - on PPI        Chronic systolic heart failure  Chronic systolic heart failure  Hypertension with hypotension  EF 30% on TTE, stable  - Hold antihypertensive due to low BP, resume as able, can be done OP      Renovascular  hypertension  Hypotension - resolved  Antibiotic-associated diarrhea - resolved, now soft stool - resolved.  Has been C diff negative.    Multiple episodes of hypotension suspecting from recurrent diarrhea, dialysis and maybe PVD  - Fluids prn and loperamide, continue to monitor   - Not on any antihypertensives at this time and HH stable  - Barrier creams and antidiarrheals ordered         Other specified anemias  -- chronic and stable; continue with home treatments & monitor       Pressure injury of left buttock, stage 2  - consulted wound care      Type 2 diabetes 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, with long-term current use of insulin  A1c 6.3, was getting intra-peritoneal insulin. Catheter now removed.   - SSI for now       CAD (coronary artery disease)  - remote stents. Was on DAPT, continuing on Asa (due to bleed plavix held)  - holding BB        VTE Risk Mitigation (From admission, onward)        Ordered     heparin (porcine) injection 1,000 Units  As needed (PRN)      03/26/19 1415     heparin (porcine) injection 500 Units  As needed (PRN)      03/26/19 0842     heparin (porcine) injection 2,000 Units  As needed (PRN)      03/21/19 1219     heparin infusion 1,000 units/500 ml in 0.9% NaCl (pressure line flush)  Intra-op continuous PRN      03/18/19 1016     IP VTE LOW RISK PATIENT  Once      03/18/19 0212     Place sequential compression device  Until discontinued      03/18/19 0212              Sung Sr MD  Department of Hospital Medicine   [facility]

## 2019-03-27 NOTE — PLAN OF CARE
Problem: Fall Injury Risk  Goal: Absence of Fall and Fall-Related Injury  Outcome: Ongoing (interventions implemented as appropriate)  Patient requires minimal assistance with transfers. She remains free from fall and injuries. Patient is npo for vascular procedure today.

## 2019-03-28 NOTE — PLAN OF CARE
Problem: Adult Inpatient Plan of Care  Goal: Plan of Care Review      Recommendations    Recommendation/Intervention:     1. Continue Diabetic diet.   2. Encourage po intake.   3. RD following.     Goals: meet >85% EEN/EPN  Nutrition Goal Status: new

## 2019-03-28 NOTE — PROGRESS NOTES
Progress Note  Nephrology    Admit Date: 3/18/2019  Length of Stay: 10  Consult Requested By: Sung Sr MD  Reason for Consult: permacath   SUBJECTIVE:     Interval History:  ALONZO consulted for permacath placement for HD access.     Review of Systems:  Constitutional: denies fever/weakness  Respiratory: denies SOB, cough   Cardiovascular: denies chest pain/palpitations   Gastrointestinal: denies N/V, diarrhea/constipation   Psych: denies confusion, depression      Patient Active Problem List   Diagnosis    Neuropathy    ESRD (end stage renal disease) on PD ( initiated dialysis 04/20/2004)    FSGS (focal segmental glomerulosclerosis) biopsy proven with collapsing features    Anemia in chronic kidney disease, on chronic dialysis    Renovascular hypertension    Hyperlipidemia    Obesity    CAD (coronary artery disease)    Type 2 diabetes 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, with long-term current use of insulin    Awaiting organ transplant status    Bulging discs - symptomatic     Spinal stenosis of sacral and sacrococcygeal region    Hypertensive renal disease    Hypertension associated with diabetes    Hypoalbuminemia    Cerebral infarction due to embolism of middle cerebral artery    Gait abnormality    Chronic low back pain    DDD (degenerative disc disease), lumbar    PAD (peripheral artery disease)    ESRD (end stage renal disease) on dialysis    Gastrointestinal hemorrhage with hematemesis    Peritonitis associated with peritoneal dialysis    Peritonitis due to infected peritoneal dialysis catheter    Abdominal pain    Occult GI bleeding    Hypotension due to hypovolemia    Nausea and vomiting    Cardiomyopathy    Chronic systolic heart failure    Acute gastric ulcer with hemorrhage    Antibiotic-associated diarrhea    Hemodialysis catheter dysfunction    Pressure injury of left buttock, stage 2    Delayed surgical wound healing    Other  specified anemias     Past Medical History:   Diagnosis Date    Abnormal finding on Pap smear, HPV DNA positive 2014    Anemia associated with chronic renal failure     on Epogen    Blood type B+     Bulging discs - symptomatic      CAD (coronary artery disease)     Cardiomyopathy 3/13/2019    Diabetes mellitus, type 2     ESRD (end stage renal disease) 04/20/2004    FSGS (focal segmental glomerulosclerosis)     with collapsing    Hyperlipidemia     Hypertension 2004    Neuropathy     Obesity     Secondary hyperparathyroidism, renal     TIA (transient ischemic attack)     Uterine fibroid     small uterine         OBJECTIVE:   Temp:  [98 °F (36.7 °C)-99 °F (37.2 °C)]   Pulse:  []   Resp:  [17-20]   BP: ()/(36-83)   SpO2:  [96 %-100 %]     No intake or output data in the 24 hours ending 03/28/19 1137      Physical Exam:  Gen: AAOx3, NAD  HEENT: mmm  Neck: no bruit, no JVD  CV: RRR, no m/r  Resp: CTAx2, normal effort  GI: soft, ND, NTTP, +BS  Extr: no LE edema  Neuro: normal reflexes, no focal deficits      Laboratory:  CBC with Diff:   Recent Labs   Lab 03/26/19  0330 03/27/19  0541 03/28/19  0512   WBC 8.81 9.61 9.20   HGB 9.1* 8.7* 9.3*   HCT 29.1* 27.7* 30.3*    235 263   LYMPH 29.5  2.6 28.7  2.8 31.4  2.9   MONO 7.9  0.7 8.2  0.8 9.2  0.9   EOSINOPHIL 1.4 2.7 2.1     COAG:  Recent Labs   Lab 03/25/19  0700 03/26/19  0330 03/27/19  0541   INR 1.0 1.0 1.0       CMP:   Recent Labs   Lab 03/26/19  0330 03/27/19  0541 03/28/19  0512   * 125* 126*   CALCIUM 8.5* 9.1 9.1   ALBUMIN 1.6* 1.6* 1.5*   PROT 6.2 6.6 6.4   * 136 134*   K 4.3 4.5 4.8   CO2 19* 21* 21*    99 99   BUN 44* 37* 46*   CREATININE 10.2* 9.1* 10.6*   ALKPHOS 115 106 108   ALT 8* 9* 7*   AST 10 11 10   BILITOT 0.2 0.2 0.2   MG 1.9 1.8 1.6   PHOS 5.4* 5.7* 6.5*           Hemoglobin A1C   Date Value Ref Range Status   09/12/2018 6.3 (H) 4.0 - 5.6 % Final     Comment:     ADA Screening  Guidelines:  5.7-6.4%  Consistent with prediabetes  >or=6.5%  Consistent with diabetes  High levels of fetal hemoglobin interfere with the HbA1C  assay. Heterozygous hemoglobin variants (HbS, HgC, etc)do  not significantly interfere with this assay.   However, presence of multiple variants may affect accuracy.     11/30/2017 7.9 (H) 4.0 - 5.6 % Final     Comment:     According to ADA guidelines, hemoglobin A1c <7.0% represents  optimal control in non-pregnant diabetic patients. Different  metrics may apply to specific patient populations.   Standards of Medical Care in Diabetes-2016.  For the purpose of screening for the presence of diabetes:  <5.7%     Consistent with the absence of diabetes  5.7-6.4%  Consistent with increasing risk for diabetes   (prediabetes)  >or=6.5%  Consistent with diabetes  Currently, no consensus exists for use of hemoglobin A1c  for diagnosis of diabetes for children.  This Hemoglobin A1c assay has significant interference with fetal   hemoglobin   (HbF). The results are invalid for patients with abnormal amounts of   HbF,   including those with known Hereditary Persistence   of Fetal Hemoglobin. Heterozygous hemoglobin variants (HbAS, HbAC,   HbAD, HbAE, HbA2) do not significantly interfere with this assay;   however, presence of multiple variants in a sample may impact the %   interference.     02/22/2017 9.0 (H) 4.5 - 6.2 % Final     Comment:     According to ADA guidelines, hemoglobin A1C <7.0% represents  optimal control in non-pregnant diabetic patients.  Different  metrics may apply to specific populations.   Standards of Medical Care in Diabetes - 2016.  For the purpose of screening for the presence of diabetes:  <5.7%     Consistent with the absence of diabetes  5.7-6.4%  Consistent with increasing risk for diabetes   (prediabetes)  >or=6.5%  Consistent with diabetes  Currently no consensus exists for use of hemoglobin A1C  for diagnosis of diabetes for children.            ASSESSMENT/PLAN:   Principle Problem:  ESRD (end stage renal disease)  Active Hospital Problems    Diagnosis  POA    *ESRD (end stage renal disease) on PD ( initiated dialysis 04/20/2004) [N18.6]  Yes     Nightly PD      Other specified anemias [D64.89]  Yes    Pressure injury of left buttock, stage 2 [L89.322]  Yes    Delayed surgical wound healing [T81.89XA]  Yes    Chronic systolic heart failure [I50.22]  Yes    Acute gastric ulcer with hemorrhage [K25.0]  Yes    Antibiotic-associated diarrhea [K52.1, T36.95XA]  Yes    Hemodialysis catheter dysfunction [T82.41XA]  Yes    Peritonitis associated with peritoneal dialysis [T85.71XA]  Yes    Renovascular hypertension [I15.0]  Yes    Type 2 diabetes 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, with long-term current use of insulin [E11.22, N18.6, Z99.2, Z79.4]  Not Applicable    CAD (coronary artery disease) [I25.10]  Yes     Cath 12/27/2012:   RCA PCI 2.5 x 18 and 3.0 x 26 mm BMS   Patent LAD and LCX   Normal EF     Dr. Berrios for NSTEMI       DAPT score 3           Resolved Hospital Problems   No resolved problems to display.       A/P:    Will place femoral TDC

## 2019-03-28 NOTE — PROGRESS NOTES
Wound care follow up:  Wound dressings to abdomen changed as directed by wound care orders in place.  Triad applied to buttocks as directed by wound care orders in place. Nursing at bedside.  Next abdomen dressing due tomorrow.  Notified Derek the OC.  [id number]       03/28/19 0932        Incision/Site 03/14/19 1445 Left Abdomen   Date First Assessed/Time First Assessed: 03/14/19 1445   Side: Left  Location: Abdomen   Incision WDL ex   Dressing Appearance Moist drainage   Drainage Amount Small   Drainage Characteristics/Odor Serosanguineous   Care Cleansed with:;Sterile normal saline   Dressing Changed;Foam   Packing Incision packed with  (Aquacel AG)   Dressing Change Due 03/29/19        Incision/Site 03/19/19 Right Abdomen   Date First Assessed: 03/19/19   Side: Right  Location: Abdomen   Incision WDL ex   Dressing Appearance Moist drainage   Drainage Amount Small   Drainage Characteristics/Odor Serosanguineous   Care Cleansed with:;Sterile normal saline   Dressing Changed;Foam   Packing Incision packed with  (Aquacel AG)   Dressing Change Due 03/29/19        Incision/Site 03/19/19 Abdomen lower   Date First Assessed: 03/19/19   Location: Abdomen  Orientation: lower   Incision WDL ex   Dressing Appearance Moist drainage   Drainage Amount Small   Drainage Characteristics/Odor Serosanguineous   Care Cleansed with:;Sterile normal saline   Dressing Changed;Foam   Packing Incision packed with  (Aquacel AG)   Dressing Change Due 03/29/19        Pressure Injury 03/27/19 Right Buttocks Stage 2   Date First Assessed: 03/27/19   Pressure Injury Present on Admission: suspected hospital acquired  Side: Right  Location: Buttocks  Staging: Stage 2   Staging Stage 2   Dressing Appearance Open to air   Drainage Amount None   Care Cleansed with:;Sterile normal saline;Applied:;Skin Barrier   Dressing Change Due 03/28/19

## 2019-03-28 NOTE — SUBJECTIVE & OBJECTIVE
Interval History:   Symptoms:  Same.  Diet:  Adequate intake.   Activity level:   Returning to normal.  Pain:  No pain.       Review of Systems   Constitutional: Negative for fever.   Respiratory: Negative for shortness of breath.    Cardiovascular: Negative for chest pain.   Gastrointestinal: Negative for abdominal pain.     Objective:     Vital Signs (Most Recent):  Temp: 98 °F (36.7 °C) (03/28/19 1115)  Pulse: 93 (03/28/19 1115)  Resp: 17 (03/28/19 1115)  BP: 115/70 (03/28/19 1115)  SpO2: 100 % (03/28/19 1115) Vital Signs (24h Range):  Temp:  [98 °F (36.7 °C)-98.5 °F (36.9 °C)] 98 °F (36.7 °C)  Pulse:  [] 93  Resp:  [17-20] 17  SpO2:  [96 %-100 %] 100 %  BP: ()/(61-83) 115/70     Weight: 93.1 kg (205 lb 4 oz)  Body mass index is 32.15 kg/m².  No intake or output data in the 24 hours ending 03/28/19 1253   Physical Exam   Constitutional: No distress.   Neck: No JVD present.   Cardiovascular: Normal rate, regular rhythm and normal heart sounds.   Pulmonary/Chest: Effort normal and breath sounds normal. No respiratory distress.   Abdominal: Soft. Bowel sounds are normal. She exhibits no distension.   Musculoskeletal: She exhibits no edema.   Neurological: She is alert.   Psychiatric: She has a normal mood and affect.       Significant Labs:  CBC:  Recent Labs   Lab 03/27/19  0541 03/28/19  0512   WBC 9.61 9.20   HGB 8.7* 9.3*   HCT 27.7* 30.3*    263     CMP:  Recent Labs   Lab 03/27/19  0541 03/28/19  0512    134*   K 4.5 4.8   CL 99 99   CO2 21* 21*   * 126*   BUN 37* 46*   CREATININE 9.1* 10.6*   CALCIUM 9.1 9.1   PROT 6.6 6.4   ALBUMIN 1.6* 1.5*   BILITOT 0.2 0.2   ALKPHOS 106 108   AST 11 10   ALT 9* 7*   ANIONGAP 16 14   EGFRNONAA 4.6* 3.8*

## 2019-03-28 NOTE — PROGRESS NOTES
Chart reviewed. Pt could not be seen as she was in the procedure. She is due for dialysis today. Tentatively plan for HD later today or tomorrow morning. Will attempt to see her tomorrow.

## 2019-03-28 NOTE — PROCEDURES
DATE OF PROCEDURE: 3/28/19     PROCEDURE TYPE:   1. Placement of left femoral vein tunneled dialysis catheter rewiring from the existing temporary catheter site.   2. Conscious Sedation     INDICATIONS: HD access     Pre-procedure Diagnoses:   ESRD    Post-procedure diagnosis:  ESRD    Operators: Kimo Weeks MD     PROCEDURE NOTE: After informed consent was obtained, the area of the left femoral vein and left groin were sterilely prepped and draped in the usual fashion. Anesthesia was then obtained with 2% lidocaine with epinephrine, then guidewire was inserted under fluoroscopy guidance and parked in the IVC/RA. Then, temporary catheter was removed. A tear-away sheath was then introduced over the guidewire and the dilator was removed. Then a 55cm Palindrome catheter was inserted into the tear-away sheath over the wire and the catheter was advanced into the RA using fluoroscopic guidance. The sheath was teared away. A counterincision was made at the venotomy site and was blunt dissected. A suitable area on the left groin/thigh was then selected; anesthesia again obtained with 2% lidocaine with Epinephrine. Using a tunnel device, tunnel was constructed inserting it from the exit site to the venotomy site. Using the tunnel device, the catheter was pulled back through the exit site. The tip of the catheter was confirmed to be in the center of the RA via fluoroscopy. The lock hub was connected. There was excellent aspiration and flush through both ports. The venotomy site was then closed with 3-0 Vicryl in a subcuticular stitch and the exit site was closed with 3-0 Prolene in a wrapped stitch. She tolerated the procedure well. There were no immediate complications. Estimated blood loss was less than 5 mL. She was then discharged from the [facility] in stable condition back to her room.    Anesthesia:  Conscious sedation was achieved with 25 mcg of Fentanyl and 0.5 mg of Midazolam (Versed) 1MG/1ML.  Local anesthesia was achieved with 20 ml of Lidocaine 2%. Moderate conscious sedation was performed and cardiorespiratory functions were monitored the entire procedure by me and RN. Sedation time 30 minutes.

## 2019-03-28 NOTE — PLAN OF CARE
Hemodialysis catheter tunneled placed in left upper groin by Dr. Weeks, verified by xray and ok to us for dialysis per Dr. Weeks.

## 2019-03-28 NOTE — PROGRESS NOTES
[facility] Hospital Medicine  Progress Note    Patient Name: Jenni Todd  MRN: [id number]  Patient Class: IP- Inpatient   Admission Date: 3/18/2019  Length of Stay: 10 days  Attending Physician: Sung Sr MD  Primary Care Provider: Michael Tan Iii, MD    [facility] Medicine Team: [facility] HOSP MED A Sung Sr MD    Subjective:     Principal Problem:ESRD (end stage renal disease)    HPI:  Jenni Todd is a 48 y.o. female w/ CAD s/p 2 PCI (2012), HFrEF 30%, HTN, DM, and ESRD on home PD admitted to [facility] on 2/21 for peritonitis. PD catheter removed. Due to hx of recurrent peritonitis she was transitioned to HD.         Hospital Course:  Bilateral IJ tunneled catheters were attempted without success. Now has a temporary dialysis catheter in her left fem. Transferred to [facility] for vascular. Nephrology access team attempted a tunneled line and were unsuccessful.  Vascular surgery consulted for HD access. Hospital course complicated by GIB with low risk ulcer. S/p RUE Graft 3/22.     Interval History:   Symptoms:  Same.  Diet:  Adequate intake.   Activity level:   Returning to normal.  Pain:  No pain.       Review of Systems   Constitutional: Negative for fever.   Respiratory: Negative for shortness of breath.    Cardiovascular: Negative for chest pain.   Gastrointestinal: Negative for abdominal pain.     Objective:     Vital Signs (Most Recent):  Temp: 98 °F (36.7 °C) (03/28/19 1115)  Pulse: 93 (03/28/19 1115)  Resp: 17 (03/28/19 1115)  BP: 115/70 (03/28/19 1115)  SpO2: 100 % (03/28/19 1115) Vital Signs (24h Range):  Temp:  [98 °F (36.7 °C)-98.5 °F (36.9 °C)] 98 °F (36.7 °C)  Pulse:  [] 93  Resp:  [17-20] 17  SpO2:  [96 %-100 %] 100 %  BP: ()/(61-83) 115/70     Weight: 93.1 kg (205 lb 4 oz)  Body mass index is 32.15 kg/m².  No intake or output data in the 24 hours ending 03/28/19 1253   Physical Exam   Constitutional: No distress.   Neck: No JVD present.    Cardiovascular: Normal rate, regular rhythm and normal heart sounds.   Pulmonary/Chest: Effort normal and breath sounds normal. No respiratory distress.   Abdominal: Soft. Bowel sounds are normal. She exhibits no distension.   Musculoskeletal: She exhibits no edema.   Neurological: She is alert.   Psychiatric: She has a normal mood and affect.       Significant Labs:  CBC:  Recent Labs   Lab 03/27/19  0541 03/28/19  0512   WBC 9.61 9.20   HGB 8.7* 9.3*   HCT 27.7* 30.3*    263     CMP:  Recent Labs   Lab 03/27/19  0541 03/28/19  0512    134*   K 4.5 4.8   CL 99 99   CO2 21* 21*   * 126*   BUN 37* 46*   CREATININE 9.1* 10.6*   CALCIUM 9.1 9.1   PROT 6.6 6.4   ALBUMIN 1.6* 1.5*   BILITOT 0.2 0.2   ALKPHOS 106 108   AST 11 10   ALT 9* 7*   ANIONGAP 16 14   EGFRNONAA 4.6* 3.8*         Assessment/Plan:      * ESRD (end stage renal disease) on PD ( initiated dialysis 04/20/2004)  Now on Hemodialysis.  Status post removal of PD catheter for concern regarding mutliple organsims growing in peritoneal fluid. Attempts at tunneled catheter in IJ x 2 during this admission.   Left femoral trialysis placed. S/p OR 3/22 per vascular for RUE graft (Arsen)  - Nephrology following, ESRD mgmt as per nephro  - No IVs in R arm  - RUQ graft is clotted per US; Surgery wish to fix it as an outpt.  They asked for ALONZO to place permcath.  Surgery does not think pt has good anatomy for vascular acces and wish for her to see Dr. Alonso for new PD placement in clinic.     Peritonitis associated with peritoneal dialysis  - Plan is for cefepime until March 28th, renal dosing  - Wound care consulted for PD site wound care        Acute gastric ulcer with hemorrhage  no more bleeding, biopsy neg for malignancy or H.pylori  - on PPI        Chronic systolic heart failure  Chronic systolic heart failure  Hypertension with hypotension  EF 30% on TTE, stable  - Hold antihypertensive due to low BP, resume as able, can be done  OP      Renovascular hypertension  Hypotension - resolved  Antibiotic-associated diarrhea - resolved, now soft stool - resolved.  Has been C diff negative.    Multiple episodes of hypotension suspecting from recurrent diarrhea, dialysis and maybe PVD  - Fluids prn and loperamide, continue to monitor   - Not on any antihypertensives at this time and HH stable  - Barrier creams and antidiarrheals ordered         Other specified anemias  -- chronic and stable; continue with home treatments & monitor       Pressure injury of left buttock, stage 2  - consulted wound care      Type 2 diabetes 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, with long-term current use of insulin  A1c 6.3, was getting intra-peritoneal insulin. Catheter now removed.   - SSI for now       CAD (coronary artery disease)  - remote stents. Was on DAPT, continuing on Asa (due to bleed plavix held)  - holding BB        VTE Risk Mitigation (From admission, onward)        Ordered     heparin (porcine) injection 1,000 Units  As needed (PRN)      03/26/19 1415     heparin (porcine) injection 500 Units  As needed (PRN)      03/26/19 0842     heparin (porcine) injection 2,000 Units  As needed (PRN)      03/21/19 1219     heparin infusion 1,000 units/500 ml in 0.9% NaCl (pressure line flush)  Intra-op continuous PRN      03/18/19 1016     IP VTE LOW RISK PATIENT  Once      03/18/19 0212     Place sequential compression device  Until discontinued      03/18/19 0212              Sung Sr MD  Department of Hospital Medicine   [facility]

## 2019-03-29 PROBLEM — R79.89 ELEVATED TROPONIN: Status: ACTIVE | Noted: 2019-01-01

## 2019-03-29 NOTE — CARE UPDATE
RN Proactive Rounding Note  Time of Visit: 432    Admit Date: 3/18/2019  LOS: 11  Code Status: Full Code   Date of Visit: 2019  : 1970  Age: 48 y.o.  Sex: female  Race: Black or   Bed: [address] [place] [address] A:   MRN: [id number]  Was the patient discharged from an ICU this admission? no   Was the patient discharged from a PACU within last 24 hours?  no  Did the patient receive conscious sedation/general anesthesia in last 24 hours?  no  Was the patient in the ED within the past 24 hours?  no  Was the patient started on NIPPV within the past 24 hours?  no  Attending Physician: Sung Sr MD  Primary Service: [facility] HOSP MED A      ASSESSMENT:     Abnormal Vital Signs: , RR 25, T 99.5  Clinical Issues: Dysrythmia     INTERVENTIONS/ RECOMMENDATIONS:     Called by primary RN d/t sudden onset of chills, fever, and tachycardia while pt receiving HD.    On arrival pt visibly shivering, denies SOB, BP stable (150/70), Spo2 100 on RA. HD terminated immediately prior to arrival to room. Stat Labs ordered and drawn, Stat EKG showed ST. MAICO Escudero at bedside. Pt HR decreased to 115, BP decreased to baseline of 100s/50s. Shivering ceased, and pt states feeling better.    Will follow up as STAT labs result.    Discussed plan of care with RNMarino.    PHYSICIAN ESCALATION:     Yes/No  yes    Orders received and case discussed with MAICO Escudero .    Disposition: Remain in room 1153.    FOLLOW-UP/CONTINGENCY:     Call back the Rapid Response Nurse at x [phone] for additional questions or concerns.

## 2019-03-29 NOTE — NURSING
Notified  Neisha Escudero of new onset of chills during hemodialysis, pt /64 , T 98 RR 38. Rapid response nurse notified.Hemodialysis stopped.

## 2019-03-29 NOTE — PROGRESS NOTES
Patient suddenly complained of chills/shivering after 1 hr of treatment. Temp 99.8, Blood pressure 158/94 and HR went up to 150. Returned  Blood. Bedside RN made aware. Rapid response at bedside.

## 2019-03-29 NOTE — PROGRESS NOTES
RN Proactive Rounding Note  Time of Visit: 35    Admit Date: 3/18/2019  LOS: 11  Code Status: Full Code   Date of Visit: 2019  : 1970  Age: 48 y.o.  Sex: female  Race: Black or   Bed: [address] [place] [address] A:   MRN: [id number]  Was the patient discharged from an ICU this admission? yes   Was the patient discharged from a PACU within last 24 hours?  no  Did the patient receive conscious sedation/general anesthesia in last 24 hours?  no  Was the patient in the ED within the past 24 hours?  no  Was the patient started on NIPPV within the past 24 hours?  no  Attending Physician: Sung Sr MD  Primary Service: [facility] HOSP MED A      ASSESSMENT:     Abnormal Vital Signs:  Clinical Issues: Circulatory     INTERVENTIONS/ RECOMMENDATIONS:     Upon arrival, the patient was supine lying flat in bed. NO acute events at this time. The HR was 117 and BP 80/50. MD Remberto ordered 12 EKG, 500 cc Bolus of NS, Troponin and blood culture. POC discussed with CRISTOBAL Pillai.     Discussed plan of care with Freya JAIN.    PHYSICIAN ESCALATION:     Yes/No  no    Orders received and case discussed with Dr. Sr .    Disposition: Remain in room 1153.    FOLLOW-UP/CONTINGENCY:     Call back the Rapid Response Nurse at x [phone] for additional questions or concerns.

## 2019-03-29 NOTE — PROGRESS NOTES
[facility] Hospital Medicine  Progress Note    Patient Name: Jenni Todd  MRN: [id number]  Patient Class: IP- Inpatient   Admission Date: 3/18/2019  Length of Stay: 11 days  Attending Physician: Sung Sr MD  Primary Care Provider: Michael Tan Iii, MD    [facility] Medicine Team: [facility] HOSP MED A Sung Sr MD    Subjective:     Principal Problem:ESRD (end stage renal disease)    HPI:  Jenni Todd is a 48 y.o. female w/ CAD s/p 2 PCI (2012), HFrEF 30%, HTN, DM, and ESRD on home PD admitted to [facility] on 2/21 for peritonitis. PD catheter removed. Due to hx of recurrent peritonitis she was transitioned to HD.         Hospital Course:  Bilateral IJ tunneled catheters were attempted without success. Now has a temporary dialysis catheter in her left fem. Transferred to [facility] for vascular. Nephrology access team attempted a tunneled line and were unsuccessful.  Vascular surgery consulted for HD access. Hospital course complicated by GIB with low risk ulcer. S/p RUE Graft 3/22.     Interval History:   Symptoms:  Perm cath placed last pm.  ~5am at the start of HD pt had tachycardia of 150 followed by hypotension of 80/50.  Pt felt weak and cold.  HD stopped.  500cc NS given with improvement of patient's symptoms..  Diet:  Adequate intake.   Activity level:   Returning to normal.  Pain:  No pain.       Review of Systems   Constitutional: Positive for chills. Negative for fever.   Respiratory: Negative for shortness of breath.    Cardiovascular: Negative for chest pain.   Gastrointestinal: Negative for abdominal pain.     Objective:     Vital Signs (Most Recent):  Temp: 97.3 °F (36.3 °C) (03/29/19 1624)  Pulse: 101 (03/29/19 1624)  Resp: 18 (03/29/19 1624)  BP: 130/73 (03/29/19 1624)  SpO2: 97 % (03/29/19 1624) Vital Signs (24h Range):  Temp:  [97.3 °F (36.3 °C)-99.2 °F (37.3 °C)] 97.3 °F (36.3 °C)  Pulse:  [] 101  Resp:  [16-26] 18  SpO2:  [97 %-100 %] 97 %  BP:  ()/(50-94) 130/73     Weight: 93.1 kg (205 lb 4 oz)  Body mass index is 32.15 kg/m².    Intake/Output Summary (Last 24 hours) at 3/29/2019 1751  Last data filed at 3/29/2019 1134  Gross per 24 hour   Intake 940 ml   Output 987 ml   Net -47 ml      Physical Exam   Constitutional: No distress.   Neck: No JVD present.   Cardiovascular: Normal rate, regular rhythm and normal heart sounds.   Pulmonary/Chest: Effort normal and breath sounds normal. No respiratory distress.   Abdominal: Soft. Bowel sounds are normal. She exhibits no distension.   Musculoskeletal: She exhibits no edema.   Neurological: She is alert.   Psychiatric: She has a normal mood and affect.       Significant Labs:  CBC:  Recent Labs   Lab 03/28/19 0512 03/29/19 0421   WBC 9.20 10.91   HGB 9.3* 10.1*   HCT 30.3* 32.5*    294     CMP:  Recent Labs   Lab 03/28/19 0512 03/29/19 0421   * 136   K 4.8 4.6   CL 99 101   CO2 21* 18*   * 131*   BUN 46* 39*   CREATININE 10.6* 8.8*   CALCIUM 9.1 9.1   PROT 6.4 7.5   ALBUMIN 1.5* 1.8*   BILITOT 0.2 0.3   ALKPHOS 108 123   AST 10 11   ALT 7* 8*   ANIONGAP 14 17*   EGFRNONAA 3.8* 4.8*         Assessment/Plan:      * ESRD (end stage renal disease) on PD ( initiated dialysis 04/20/2004)  Now on Hemodialysis.  Status post removal of PD catheter for concern regarding mutliple organsims growing in peritoneal fluid. Attempts at tunneled catheter in IJ x 2 during this admission.   Left femoral trialysis placed. S/p OR 3/22 per vascular for RUE graft (Arsen)  3/28 groin perm-cath placed  - Nephrology following, ESRD mgmt as per nephro  - No IVs in R arm  - RUQ graft is clotted per US; Surgery wish to fix it as an outpt.  Surgery does not think pt has good anatomy for vascular acces and wish for her to see Dr. Alonso for new PD placement in clinic.     Elevated troponin  3/29 Tn 0.2>0.9 after a tachy followed by hypotensive episode.  - repeat ekg, serial troponin  - cards consulted  -  blood culture collected in case of infection from recent procedure yesterday      CAD (coronary artery disease)  remote stents.   - Was on DAPT but held due to GIB 3/13 and need for procedures here. Only ASA restarted.  Will resume plavix until cards consultation 3/29 complete.    - holding BB due to recent low BP      Acute gastric ulcer with hemorrhage  3/13/2019 had GI bleed requiring 2 uPRBC.  no more bleeding, biopsy neg for malignancy or H.pylori  - on PPI        Chronic systolic heart failure  Chronic systolic heart failure  Hypertension with hypotension  EF 30% on TTE, stable  - Hold antihypertensive due to low BP, resume as able, can be done OP      Renovascular hypertension  Hypotension - resolved  Antibiotic-associated diarrhea - resolved, now soft stool - resolved.  Has been C diff negative.    Multiple episodes of hypotension suspecting from recurrent diarrhea, dialysis and maybe PVD  - Fluids prn and loperamide, continue to monitor   - Not on any antihypertensives at this time and HH stable  - Barrier creams and antidiarrheals ordered         Other specified anemias  -- chronic and stable; continue with home treatments & monitor       Pressure injury of left buttock, stage 2  - consulted wound care      Peritonitis associated with peritoneal dialysis  Cefepime given until March 28th (10 days)  - Wound care consulted for PD site wound care        Type 2 diabetes 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, with long-term current use of insulin  A1c 6.3, was getting intra-peritoneal insulin. Catheter now removed.   - SSI for now         VTE Risk Mitigation (From admission, onward)        Ordered     heparin (porcine) injection 5,000 Units  Every 8 hours      03/29/19 0843     heparin infusion 1,000 units/500 ml in 0.9% NaCl (pressure line flush)  Intra-op continuous PRN      03/18/19 1016     IP VTE LOW RISK PATIENT  Once      03/18/19 0212     Place sequential compression device  Until  discontinued      03/18/19 0212              Sung Sr MD  Department of Hospital Medicine   [facility]

## 2019-03-29 NOTE — SUBJECTIVE & OBJECTIVE
Interval History:   Dialysis attempted overnight but after 1 hr of treatment the patient became tachycardic, hypertensive, temperature of 99.5 F, and w/ complaints of chills/shivering. RR called.Treatment terminated with resolution of symptoms soon after.   Electrolytes and acid base stable this AM. Patient seen this morning on room air w/o distress.    Patient seems to more hypotensive at this time, BP 80/50. Bolus ordered. Blood cultures drawn.     Review of patient's allergies indicates:   Allergen Reactions    Clindamycin Diarrhea    Flagyl [metronidazole hcl]     Metronidazole      Current Facility-Administered Medications   Medication Frequency    0.9%  NaCl infusion PRN    acetaminophen tablet 650 mg Q8H PRN    dextrose 50% injection 12.5 g PRN    dextrose 50% injection 25 g PRN    glucagon (human recombinant) injection 1 mg PRN    glucose chewable tablet 16 g PRN    glucose chewable tablet 24 g PRN    heparin (porcine) injection 5,000 Units Q8H    heparin infusion 1,000 units/500 ml in 0.9% NaCl (pressure line flush) Continuous PRN    insulin aspart U-100 pen 0-5 Units QID (AC + HS) PRN    insulin detemir U-100 pen 10 Units QHS    loperamide capsule 2 mg QID PRN    ondansetron injection 4 mg Q8H PRN    pantoprazole EC tablet 40 mg Daily    sevelamer carbonate tablet 800 mg TID WM    sodium bicarbonate tablet 1,300 mg BID    sodium chloride 0.9% flush 5 mL PRN    tuberculin injection 5 Units Once       Objective:     Vital Signs (Most Recent):  Temp: 98 °F (36.7 °C) (03/29/19 1136)  Pulse: 95 (03/29/19 1136)  Resp: 19 (03/29/19 1136)  BP: (!) 90/50 (03/29/19 1136)  SpO2: 100 % (03/29/19 1136)  O2 Device (Oxygen Therapy): room air (03/29/19 1136) Vital Signs (24h Range):  Temp:  [98 °F (36.7 °C)-99.2 °F (37.3 °C)] 98 °F (36.7 °C)  Pulse:  [] 95  Resp:  [16-26] 19  SpO2:  [97 %-100 %] 100 %  BP: ()/(50-94) 90/50     Weight: 93.1 kg (205 lb 4 oz) (03/18/19 0130)  Body mass index  is 32.15 kg/m².  Body surface area is 2.1 meters squared.    I/O last 3 completed shifts:  In: 500 [Other:500]  Out: 987 [Other:987]    Physical Exam   Constitutional: No distress.   Neck: No JVD present.   Cardiovascular: Normal rate, regular rhythm and normal heart sounds.   Pulmonary/Chest: Effort normal and breath sounds normal. No respiratory distress.   Abdominal: Soft. Bowel sounds are normal. She exhibits no distension.   Musculoskeletal: She exhibits no edema.   Neurological: She is alert.   Psychiatric: She has a normal mood and affect.       Significant Labs:  CBC:   Recent Labs   Lab 03/29/19 0421   WBC 10.91   RBC 3.32*   HGB 10.1*   HCT 32.5*      MCV 98   MCH 30.4   MCHC 31.1*     CMP:   Recent Labs   Lab 03/29/19 0421   *   CALCIUM 9.1   ALBUMIN 1.8*   PROT 7.5      K 4.6   CO2 18*      BUN 39*   CREATININE 8.8*   ALKPHOS 123   ALT 8*   AST 11   BILITOT 0.3

## 2019-03-29 NOTE — PT/OT/SLP PROGRESS
Physical Therapy      Patient Name:  Jenni Todd   MRN:  [id number]    Attempted to visit pt in AM - pt request therapy to return at later time d/t fatigue.  Pt educated on possibility PT unable to return later in day - v/u.  Pt educated on safe to ambulate in room c assistance of 1x person and RW and encouraged to get up to chair throughout day to improve tolerance for activities. - v/u.  PT unable to return later in day for session. Will follow-up next scheduled date.    Adoplh Zayas, PT   3/29/2019

## 2019-03-29 NOTE — PROGRESS NOTES
[facility]  Nephrology  Progress Note    Patient Name: Jenni Todd  MRN: [id number]  Admission Date: 3/18/2019  Hospital Length of Stay: 11 days  Attending Provider: Sung Sr MD   Primary Care Physician: Michael Tan Iii, MD  Principal Problem:ESRD (end stage renal disease)    Subjective:     HPI: Ms. Todd is an ESRD patient on CCPD, recently transitioned to HD at [facility] for recurrent peritonitis who presents as a transfer from [facility] for vascular surgery evaluation for vascular access.  According to records, she presented to [facility] on the advice of her OP Nephrologist for treatment of peritonitis.  Cultures grew out Rothia sp, enterobacter, and MSSA.  She continued to CCPD while there, but noted to have worsening white count on peritoneal fluids and abdominal pain, and decision was made to remove the peritoneal catheter and transition to hemodialysis. According to patient, her PD catheter was removed on 3/14, and she underwent temporary dialysis catheter placement at that time.  Surgery attempted to place temporary line in the IJ's, but according to records, they were unsuccessfully at advancing the guidewire and she had to have a femoral catheter placed for CVVHD.  Records indicate that she had had multiple catheter exchanges due to poor flows, despite using longer catheters, and decision was made to consult vascular surgery for accuseal graft for vascular access.  [place] [facility] [place] per her request and Vascular surgery, nephrology, and ALONZO was consulted for evaluation.  ALONZO attempted placement of permacath, but was unsuccessful.  Vascular surgery planning for Accuseal graft placement on Wednesday (3/20).  Nephrology has been consulted for ESRD management while in-patient.    She CVVHD yesterday, and according to family, had to be discontinued 2/2 clotting and multiple pressure alarms prior to [place] [facility].  Regarding her peritonitis, she was  followed by ID at OSH who recommend to continue her Cefepime 1 g QD until 3/28, which has been continued here at [facility].  ICU has planned to step patient down to hospital medicine.    Interval History:   Patient s/p perm-a-cath placement on 3/28. HD attempted overnight but after 1 hr of treatment the patient became tachycardic, hypertensive, temperature of 99.5 F, and w/ complaints of chills/shivering. RR called.Treatment terminated with resolution of symptoms soon after.   Electrolytes and acid base stable this AM. Patient seen this morning on room air w/o distress.    Patient seems to more hypotensive at this time, BP 80/50. Bolus ordered. Blood cultures drawn.     Review of patient's allergies indicates:   Allergen Reactions    Clindamycin Diarrhea    Flagyl [metronidazole hcl]     Metronidazole      Current Facility-Administered Medications   Medication Frequency    0.9%  NaCl infusion PRN    acetaminophen tablet 650 mg Q8H PRN    dextrose 50% injection 12.5 g PRN    dextrose 50% injection 25 g PRN    glucagon (human recombinant) injection 1 mg PRN    glucose chewable tablet 16 g PRN    glucose chewable tablet 24 g PRN    heparin (porcine) injection 5,000 Units Q8H    heparin infusion 1,000 units/500 ml in 0.9% NaCl (pressure line flush) Continuous PRN    insulin aspart U-100 pen 0-5 Units QID (AC + HS) PRN    insulin detemir U-100 pen 10 Units QHS    loperamide capsule 2 mg QID PRN    ondansetron injection 4 mg Q8H PRN    pantoprazole EC tablet 40 mg Daily    sevelamer carbonate tablet 800 mg TID WM    sodium bicarbonate tablet 1,300 mg BID    sodium chloride 0.9% flush 5 mL PRN    tuberculin injection 5 Units Once       Objective:     Vital Signs (Most Recent):  Temp: 98 °F (36.7 °C) (03/29/19 1136)  Pulse: 95 (03/29/19 1136)  Resp: 19 (03/29/19 1136)  BP: (!) 90/50 (03/29/19 1136)  SpO2: 100 % (03/29/19 1136)  O2 Device (Oxygen Therapy): room air (03/29/19 1136) Vital Signs (24h  Range):  Temp:  [98 °F (36.7 °C)-99.2 °F (37.3 °C)] 98 °F (36.7 °C)  Pulse:  [] 95  Resp:  [16-26] 19  SpO2:  [97 %-100 %] 100 %  BP: ()/(50-94) 90/50     Weight: 93.1 kg (205 lb 4 oz) (03/18/19 0130)  Body mass index is 32.15 kg/m².  Body surface area is 2.1 meters squared.    I/O last 3 completed shifts:  In: 500 [Other:500]  Out: 987 [Other:987]    Physical Exam   Constitutional: No distress.   Neck: No JVD present.   Cardiovascular: Normal rate, regular rhythm and normal heart sounds.   Pulmonary/Chest: Effort normal and breath sounds normal. No respiratory distress.   Abdominal: Soft. Bowel sounds are normal. She exhibits no distension.   Musculoskeletal: She exhibits no edema.   Neurological: She is alert.   Psychiatric: She has a normal mood and affect.       Significant Labs:  CBC:   Recent Labs   Lab 03/29/19  0421   WBC 10.91   RBC 3.32*   HGB 10.1*   HCT 32.5*      MCV 98   MCH 30.4   MCHC 31.1*     CMP:   Recent Labs   Lab 03/29/19  0421   *   CALCIUM 9.1   ALBUMIN 1.8*   PROT 7.5      K 4.6   CO2 18*      BUN 39*   CREATININE 8.8*   ALKPHOS 123   ALT 8*   AST 11   BILITOT 0.3            Assessment/Plan:     * ESRD (end stage renal disease) on PD ( initiated dialysis 04/20/2004)  ESRD on CCPD admitted to OSH for treatment of peritonitis.  Hospital course complicated by worsening WBC in peritoneal fluid despite appropriate Abx coverage requiring removal of her peritoneal dialysis catheter. Vascular access attempted to multiple occasions at the OSH, but continues to have poor vasculature for long term dialysis access and she was transferred to [facility] for vascular surgery evaluation for possible accuseal graft. Accuseal graft completed, but failed due to clotting. Patient refusing declot. Perm cath placed on 3/28.    Plan/Recommendations:  -Patient with 1 hr dialysis overnight which was c/b a new onset of chills/shivering, tachycardia, and hypertension. RR called.  Treatment discontinued with improvement of symptoms. Patient now with hypotension today.   -Plan to hold dialysis today. No urgent need. Electrolytes and volume status stable.   -Will plan for HD tomorrow for volume management and metabolic clearance  -Recommend collecting blood cultures from central line to r/o any infectious source prompting episode during HD overnight and hypotension today.   -Agree antibiotics         Thank you for your consult. I will follow-up with patient. Please contact us if you have any additional questions.    Chelsie Beasley NP  Nephrology  [facility]

## 2019-03-29 NOTE — SUBJECTIVE & OBJECTIVE
Interval History:   Symptoms:  Perm cath placed last pm.  ~5am at the start of HD pt had tachycardia of 150 followed by hypotension of 80/50.  Pt felt weak and cold.  HD stopped.  500cc NS given with improvement of patient's symptoms..  Diet:  Adequate intake.   Activity level:   Returning to normal.  Pain:  No pain.       Review of Systems   Constitutional: Positive for chills. Negative for fever.   Respiratory: Negative for shortness of breath.    Cardiovascular: Negative for chest pain.   Gastrointestinal: Negative for abdominal pain.     Objective:     Vital Signs (Most Recent):  Temp: 97.3 °F (36.3 °C) (03/29/19 1624)  Pulse: 101 (03/29/19 1624)  Resp: 18 (03/29/19 1624)  BP: 130/73 (03/29/19 1624)  SpO2: 97 % (03/29/19 1624) Vital Signs (24h Range):  Temp:  [97.3 °F (36.3 °C)-99.2 °F (37.3 °C)] 97.3 °F (36.3 °C)  Pulse:  [] 101  Resp:  [16-26] 18  SpO2:  [97 %-100 %] 97 %  BP: ()/(50-94) 130/73     Weight: 93.1 kg (205 lb 4 oz)  Body mass index is 32.15 kg/m².    Intake/Output Summary (Last 24 hours) at 3/29/2019 1751  Last data filed at 3/29/2019 1134  Gross per 24 hour   Intake 940 ml   Output 987 ml   Net -47 ml      Physical Exam   Constitutional: No distress.   Neck: No JVD present.   Cardiovascular: Normal rate, regular rhythm and normal heart sounds.   Pulmonary/Chest: Effort normal and breath sounds normal. No respiratory distress.   Abdominal: Soft. Bowel sounds are normal. She exhibits no distension.   Musculoskeletal: She exhibits no edema.   Neurological: She is alert.   Psychiatric: She has a normal mood and affect.       Significant Labs:  CBC:  Recent Labs   Lab 03/28/19 0512 03/29/19 0421   WBC 9.20 10.91   HGB 9.3* 10.1*   HCT 30.3* 32.5*    294     CMP:  Recent Labs   Lab 03/28/19 0512 03/29/19 0421   * 136   K 4.8 4.6   CL 99 101   CO2 21* 18*   * 131*   BUN 46* 39*   CREATININE 10.6* 8.8*   CALCIUM 9.1 9.1   PROT 6.4 7.5   ALBUMIN 1.5* 1.8*   BILITOT 0.2  0.3   ALKPHOS 108 123   AST 10 11   ALT 7* 8*   ANIONGAP 14 17*   EGFRNONAA 3.8* 4.8*

## 2019-03-29 NOTE — PLAN OF CARE
CM saw the patient at the Bedside. The Daughter and Sister are also present at the Bedside at the time of visit. The patient and family were notified that the CM team continues to follow for D/C needs and/or recommendations. Current D/C plan is SNF with new HD chair setup. CM name and number remain on the board at the Bedside for the patient or family to call with any D/C needs or questions. Understanding verbalized.

## 2019-03-29 NOTE — PLAN OF CARE
Problem: Adult Inpatient Plan of Care  Goal: Plan of Care Review  Outcome: Ongoing (interventions implemented as appropriate)  Pt free of falls and injury. Pt AAOx4. Bed low, breaks locked, SR up x2, diabetes education provided,triad cream applied, perineal care provided, both short and long acting insulin given.Call light within reach, will continue to monitor.

## 2019-03-30 PROBLEM — I21.4 NSTEMI (NON-ST ELEVATED MYOCARDIAL INFARCTION): Status: ACTIVE | Noted: 2019-01-01

## 2019-03-30 NOTE — PROGRESS NOTES
HD treatment complete. Treatment ended 30 minutes early due to system clotting. Duration of treatment 3 hours and 2 l removed. Treatment was tolerated well. Catheter to left femoral flushed with NS and locked with heparin. Capped and taped.

## 2019-03-30 NOTE — PROGRESS NOTES
[facility] Hospital Medicine  Progress Note    Patient Name: Jenni Todd  MRN: [id number]  Patient Class: IP- Inpatient   Admission Date: 3/18/2019  Length of Stay: 12 days  Attending Physician: Sung Sr MD  Primary Care Provider: Michael Tan Iii, MD    [facility] Hospital Medicine Team: [facility] HOSP MED A Sung Sr MD    Subjective:     Principal Problem:ESRD (end stage renal disease)    HPI:  Jenni Todd is a 48 y.o. female w/ CAD s/p 2 PCI (2012), HFrEF 30%, HTN, DM, and ESRD on home PD admitted to [facility] on 2/21 for peritonitis. PD catheter removed. Due to hx of recurrent peritonitis she was transitioned to HD.         Hospital Course:  Bilateral IJ tunneled catheters were attempted without success. Now has a temporary dialysis catheter in her left fem. Transferred to [facility] for vascular. Nephrology access team attempted a tunneled line and were unsuccessful.  Vascular surgery consulted for HD access. Hospital course complicated by GIB with low risk ulcer. S/p RUE Graft 3/22.     Interval History:   Symptoms:  Doing well.  Diet:  Adequate intake.   Activity level:   Returning to normal.  Pain:  No pain.       Review of Systems   Constitutional: Negative for chills and fever.   Respiratory: Negative for shortness of breath.    Cardiovascular: Negative for chest pain.   Gastrointestinal: Negative for abdominal pain.     Objective:     Vital Signs (Most Recent):  Temp: 98.2 °F (36.8 °C) (03/30/19 1250)  Pulse: 98 (03/30/19 1250)  Resp: 14 (03/30/19 1250)  BP: 100/66 (03/30/19 1250)  SpO2: 100 % (03/30/19 1250) Vital Signs (24h Range):  Temp:  [97.3 °F (36.3 °C)-98.6 °F (37 °C)] 98.2 °F (36.8 °C)  Pulse:  [] 98  Resp:  [14-18] 14  SpO2:  [97 %-100 %] 100 %  BP: ()/(51-87) 100/66     Weight: 93.1 kg (205 lb 4 oz)  Body mass index is 32.15 kg/m².    Intake/Output Summary (Last 24 hours) at 3/30/2019 1406  Last data filed at 3/30/2019 0500  Gross per 24 hour   Intake  "240 ml   Output --   Net 240 ml      Physical Exam   Constitutional: No distress.   Neck: No JVD present.   Cardiovascular: Normal rate, regular rhythm and normal heart sounds.   Pulmonary/Chest: Effort normal and breath sounds normal. No respiratory distress.   Abdominal: Soft. Bowel sounds are normal. She exhibits no distension.   Musculoskeletal: She exhibits no edema.   Neurological: She is alert.   Psychiatric: She has a normal mood and affect.       Significant Labs:  CBC:  Recent Labs   Lab 03/29/19  0421 03/30/19  0654   WBC 10.91 7.78   HGB 10.1* 9.0*   HCT 32.5* 29.0*    295     CMP:  Recent Labs   Lab 03/29/19  0421 03/30/19  0654    135*   K 4.6 5.0    100   CO2 18* 20*   * 134*   BUN 39* 47*   CREATININE 8.8* 10.7*   CALCIUM 9.1 9.4   PROT 7.5 7.0   ALBUMIN 1.8* 1.7*   BILITOT 0.3 0.2   ALKPHOS 123 124   AST 11 14   ALT 8* 8*   ANIONGAP 17* 15   EGFRNONAA 4.8* 3.8*         Assessment/Plan:      * ESRD (end stage renal disease) on PD ( initiated dialysis 04/20/2004)  Now on Hemodialysis.  Status post removal of PD catheter for concern regarding mutliple organsims growing in peritoneal fluid. Attempts at tunneled catheter in IJ x 2 during this admission.   Left femoral trialysis placed. S/p OR 3/22 per vascular for RUE graft (Arsen)  3/28 groin perm-cath placed  - Nephrology following, ESRD mgmt as per nephro  - No IVs in R arm  - RUQ graft is clotted per US; Surgery wish to fix it as an outpt.  Surgery does not think pt has good anatomy for vascular acces and wish for her to see Dr. Alonso for new PD placement in clinic.     NSTEMI (non-ST elevated myocardial infarction)  3/29 Tn 0.2>1.0  after a tachy followed by hypotensive episode the day after her groin perm-cath was placed.  cards consulted 3/29 "recommend continuing medical therapy with Plavix 75 mg QD and Atorvastatin 40 mg QD; would avoid ASA given recent GIB .  -unable to initiate GDMT for HFrEF given soft BP, " "will attempt to resume home beta-blocker, ACE-I/ARB and potassium sparing agent when able and ischemic evaluation at future time point"  - blood culture collected in case of infection from recent procedure   - new tte ordered    Acute gastric ulcer with hemorrhage  3/13/2019 had GI bleed requiring 2 uPRBC.  no more bleeding, biopsy neg for malignancy or H.pylori  - on PPI        Chronic systolic heart failure  Chronic systolic heart failure  Hypertension with hypotension  EF 30% on TTE, stable  - Hold antihypertensive due to low BP, resume as able, can be done OP      Renovascular hypertension  Hypotension - resolved  Antibiotic-associated diarrhea - resolved, now soft stool - resolved.  Has been C diff negative.    Multiple episodes of hypotension suspecting from recurrent diarrhea, dialysis and maybe PVD  - Fluids prn and loperamide, continue to monitor   - Not on any antihypertensives at this time and HH stable  - Barrier creams and antidiarrheals ordered         Other specified anemias  -- chronic and stable; continue with home treatments & monitor       Pressure injury of left buttock, stage 2  - consulted wound care      Peritonitis associated with peritoneal dialysis  Cefepime given until March 28th (10 days)  - Wound care consulted for PD site wound care        Type 2 diabetes 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, with long-term current use of insulin  A1c 6.3, was getting intra-peritoneal insulin. Catheter now removed.   - SSI for now       CAD (coronary artery disease)  remote stents.   - Was on DAPT but held due to GIB 3/13 and need for procedures here. Only plavix restarted given recent GIB per cards recs 3/29.    - holding BB due to recent low BP        VTE Risk Mitigation (From admission, onward)        Ordered     heparin (porcine) injection 1,000 Units  As needed (PRN)      03/30/19 1154     heparin infusion 1,000 units/500 ml in 0.9% NaCl (pressure line flush)  Intra-op " continuous PRN      03/18/19 1016     IP VTE LOW RISK PATIENT  Once      03/18/19 0212     Place sequential compression device  Until discontinued      03/18/19 0212              Sung Sr MD  Department of Hospital Medicine   [facility]

## 2019-03-30 NOTE — PROGRESS NOTES
System clotted after 1 hour and 15 minutes of treatment. Clots noted in both chambers. Arterial blood was rinsed back, however, venous blood was not rinsed back. System was restarted.

## 2019-03-30 NOTE — CONSULTS
[facility]  Cardiology  Consult Note    Patient Name: Jenni Todd  MRN: [id number]  Admission Date: 3/18/2019  Hospital Length of Stay: 11 days  Code Status: Full Code   Attending Provider: Sung Sr MD   Consulting Provider: Gabriella Ho MD  Primary Care Physician: Michael Tan Iii, MD  Principal Problem:ESRD (end stage renal disease)    Patient information was obtained from patient and ER records.     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 At OSH, patient's cultures grew Rothia species, Enterobacter and MSSA. Her peritoneal catheter was removed she was transition to hemodialysis via temporarily femoral line. Cardiology consult service saw pt at OSH and had recommended holding Plavix and GDMT given soft BP's. During her hospitalization here at [facility], patient was started on HD. Overnight, on 3/28-3/29, after 1 hr of treatment the patient became tachycardic, hypotensive and complained of chills/shivering. Rapid response was called and HD was stopped with resolution of symptoms soon after. EKG at that time showed sinus tachycardia. Labs were drawn and troponin was found to be 0.2 and then on serial checks estela to 0.9-1 range prompting Cardiology consult.     Cardiac Stress Test (2017):   Findings:   1. The EKG portion of this  study is negative for ischemia at a peak heart rate of 104 bpm (64% of predicted).   2. Blood pressure remained stable throughout the protocol  (Presenting BP: 161/81 Peak BP: 170/69).   3. No significant arrhythmias were present.   4. There were no symptoms of chest discomfort or significant dyspnea throughout the protocol.   5. Nuclear portion was negative for ischemia or infarct. Wall motion is normal. Global LV systolic function is normal with LV EF of 55% and no evidence of LV dilation.     TTE (2019):   · Moderately decreased left ventricular systolic function. The estimated ejection fraction is 30%  · Grade II (moderate) left ventricular diastolic dysfunction consistent with pseudonormalization.  · Elevated left atrial pressure.  · Normal right ventricular systolic function.  · Mild aortic valve stenosis.  · Aortic valve area is 1.68 cm2; peak velocity is 2.28 m/s; mean gradient is 12 mmHg.  · Normal central venous pressure (3 mm Hg).  · The estimated PA systolic pressure is 27 mm Hg      Past Medical History:   Diagnosis Date    Abnormal finding on Pap smear, HPV DNA positive     Anemia associated with chronic renal failure     on Epogen    Blood type B+     Bulging discs - symptomatic      CAD (coronary artery disease)     Cardiomyopathy 3/13/2019    Diabetes mellitus, type 2     ESRD (end stage renal disease) 2004    FSGS (focal segmental glomerulosclerosis)     with collapsing    Hyperlipidemia     Hypertension     Neuropathy     Obesity     Secondary hyperparathyroidism, renal     TIA (transient ischemic attack)     Uterine fibroid     small uterine        Past Surgical History:   Procedure Laterality Date    CARDIAC CATHETERIZATION      PCI x 2     SECTION, CLASSIC      COLONOSCOPY N/A 2018    Performed by Rodrigue Russell [facility] at [facility]O (2ND FLR)    DEBRIDEMENT-WOUND Left 2016    Performed by [facility]ress[facility]ox [facility] at [facility] OR    DIALYSIS FISTULA  CREATION      multiple fistulas and grafts before PD     EGD (ESOPHAGOGASTRODUODENOSCOPY) N/A 3/14/2019    Performed by Adolph Davila MD at [facility] ENDO    EGD (ESOPHAGOGASTRODUODENOSCOPY) N/A 3/13/2019    Performed by Adolph Davila MD at [facility] ENDO    INCISION AND DRAINAGE (I&D), LABIAL N/A 4/17/2016    Performed by Harmony Fernandez MD at [facility] OR    INCISION AND DRAINAGE (I&D), LABIAL (ADD ON) Left 4/18/2016    Performed by Teressa Maddox MD at [facility] OR    INCISION AND DRAINAGE OF WOUND Left 2016    VULVAR ABCESS WITH NECROSIS    Insertion, Catheter, Central Venous, Hemodialysis N/A 3/28/2019    Performed by [facility]o Weeks [facility] at [facility] CATH LAB    Insertion, Catheter, Central Venous, Hemodialysis N/A 3/18/2019    Performed by [facility]eks MD at [facility] CATH LAB    INSERTION, CATHETER, TUNNELED ABORTED Left 3/14/2019    Performed by Servando Solis MD at [facility] OR    INSERTION, GRAFT, ARTERIOVENOUS, RIGHT ARM Right 3/22/2019    Performed by BESSIE Wynn III, MD at [facility] OR 2ND FLR    ORIF, HIP Left 9/13/2018    Performed by Tevin Grullon MD at [facility] OR    PERITONEAL CATHETER INSERTION      PERMCATH REWIRE- TUNNELED CATH REWIRE Left 11/13/2017    Performed by Baldev Munroe MD at [facility] CATH LAB    PERMCATH REWIRE- TUNNELED CATH REWIRE N/A 10/5/2017    Performed by Baldev Munroe MD at [facility] CATH LAB    REMOVAL, CATHETER, DIALYSIS, PERITONEAL N/A 3/14/2019    Performed by Servando Solis MD at [facility] OR    UMBILICAL HERNIA REPAIR      Venogram, possible intervention Right 3/20/2019    Performed by BESSIE Wynn III, MD at [facility] CATH LAB       Review of patient's allergies indicates:   Allergen Reactions    Clindamycin Diarrhea    Flagyl [metronidazole hcl]     Metronidazole        No current facility-administered medications on file prior to encounter.      Current Outpatient Medications on File Prior to Encounter   Medication Sig    aspirin (ECOTRIN) 81 MG EC tablet Take 1  tablet (81 mg total) by mouth once daily.    atorvastatin (LIPITOR) 40 MG tablet Take 40 mg by mouth every evening.     epoetin adonay 10,000 unit/mL Soln 1 mL, epoetin adonay 20,000 unit/mL Soln 1 mL Inject 30,000 Units into the vein every 14 (fourteen) days.    gabapentin (NEURONTIN) 100 MG capsule 1 capsule 3 (three) times daily.    nateglinide (STARLIX) 120 MG tablet STARLIX 120MG 30 MINUTES BEFORE EACH MEAL.    ONETOUCH VERIO Strp USE 1 STRIP ONCE DAILY IN VITRO    vitamin renal formula, B-complex-vitamin c-folic acid, (B COMPLEX-C-FOLIC ACID) 1 mg Cap Take 1 capsule by mouth once daily. 1 Capsule Oral Every day     Family History     Problem Relation (Age of Onset)    Cancer Maternal Grandmother, Paternal Grandfather    Diabetes Maternal Aunt, Paternal Aunt        Tobacco Use    Smoking status: Never Smoker    Smokeless tobacco: Never Used   Substance and Sexual Activity    Alcohol use: No     Comment: Pt reports some social use of about 1-2 drinks about every six months.    Drug use: No    Sexual activity: Not Currently     Partners: Male     Birth control/protection: None     Review of Systems   Constitution: Negative for malaise/fatigue and weight gain.   Cardiovascular: Negative for chest pain, dyspnea on exertion, irregular heartbeat, leg swelling, near-syncope, orthopnea, palpitations, paroxysmal nocturnal dyspnea and syncope.   Respiratory: Negative for cough and shortness of breath.    Gastrointestinal: Negative for abdominal pain, nausea and vomiting.   Neurological: Negative for dizziness, focal weakness, headaches, light-headedness and weakness.     Objective:     Vital Signs (Most Recent):  Temp: 98.2 °F (36.8 °C) (03/29/19 2028)  Pulse: 88 (03/29/19 2028)  Resp: 18 (03/29/19 2028)  BP: 102/64 (03/29/19 2028)  SpO2: 100 % (03/29/19 2028) Vital Signs (24h Range):  Temp:  [97.3 °F (36.3 °C)-99.2 °F (37.3 °C)] 98.2 °F (36.8 °C)  Pulse:  [] 88  Resp:  [16-20] 18  SpO2:  [97 %-100 %] 100  %  BP: ()/(50-94) 102/64     Weight: 93.1 kg (205 lb 4 oz)  Body mass index is 32.15 kg/m².    SpO2: 100 %  O2 Device (Oxygen Therapy): room air      Intake/Output Summary (Last 24 hours) at 3/29/2019 2250  Last data filed at 3/29/2019 1134  Gross per 24 hour   Intake 940 ml   Output 987 ml   Net -47 ml       Lines/Drains/Airways     Central Venous Catheter Line                 Hemodialysis Catheter 03/28/19 1528 left femoral 1 day          Drain                 Hemodialysis AV Fistula Left upper arm -- days         Hemodialysis AV Graft Right upper arm -- days          Peripheral Intravenous Line                 Peripheral IV - Single Lumen 03/28/19 0600 Anterior;Left Forearm 1 day                Physical Exam   Constitutional: She is oriented to person, place, and time.   HENT:   Mouth/Throat: Oropharynx is clear and moist.   Eyes: Pupils are equal, round, and reactive to light.   Neck: No JVD present.   Cardiovascular: Normal rate, regular rhythm, normal heart sounds and intact distal pulses.   Pulmonary/Chest: Effort normal and breath sounds normal.   Abdominal: Soft. She exhibits no distension. There is no tenderness.   Musculoskeletal: She exhibits no edema.   Neurological: She is alert and oriented to person, place, and time.   Skin: Skin is warm and dry.   Vitals reviewed.      Significant Labs: All pertinent lab results from the last 24 hours have been reviewed.    Significant Imaging: Echocardiogram:   Transthoracic echo (TTE) complete (Cupid Only):   Results for orders placed or performed during the hospital encounter of 02/21/19   Transthoracic echo (TTE) complete (Cupid Only)   Result Value Ref Range    AORTIC VALVE CUSP SEPERATION 1.13 cm    PV PEAK VELOCITY 1.14 cm/s    LVIDD 4.93 3.5 - 6.0 cm    IVS 1.21 (A) 0.6 - 1.1 cm    PW 1.21 (A) 0.6 - 1.1 cm    Ao root annulus 2.93 cm    LVIDS 3.48 2.1 - 4.0 cm    FS 29 28 - 44 %    LV mass 231.27 g    LA size 4.41 cm    RVDD 2.63 cm    Left Ventricle  Relative Wall Thickness 0.49 cm    AV mean gradient 12 mmHg    AV valve area 1.68 cm2    AV Velocity Ratio 0.50     AV index (prosthetic) 0.53     E/A ratio 1.37     E wave decelartion time 158.30 msec    IVRT 0.06 msec    LVOT diameter 2.01 cm    LVOT area 3.17 cm2    LVOT peak otoniel [id number] m/s    LVOT peak VTI 20.00 cm    Ao peak otoniel 2.28 m/s    Ao VTI 37.80 cm    LVOT stroke volume 63.43 cm3    AV peak gradient 20.79 mmHg    MV Peak E Otoniel 0.96 m/s    TR Max Otoniel 2.47 m/s    MV Peak A Otoniel 0.70 m/s    LV Systolic Volume 50.00 mL    LV Systolic Volume Index 23.5 mL/m2    LV Diastolic Volume 114.52 mL    LV Diastolic Volume Index 53.86 mL/m2    LV Mass Index 108.8 g/m2    Triscuspid Valve Regurgitation Peak Gradient 24.40 mmHg    LV LATERAL E/E' RATIO 12.00     Right Atrial Pressure (from IVC) 3 mmHg    TDI LATERAL 0.08     TV rest pulmonary artery pressure 27 mmHg     Assessment and Plan:     Elevated troponin  -NSTEMI, likely due to type 2 demand ischemia in the setting of sepsis/infection, hypotensive episode and impaired clearance of troponin due to ESRD with history of underlying CAD   -serial EKG's reviewed initially sinus tachycardia and later NSR  -TTE from OSH reported LVEF 30%, bedside limited echo performed by me showed improvement in LVEF, no isolated WMA  -serial troponin estela from 0.2 to 0.9-1 range  -recommend continuing medical therapy with Plavix 75 mg QD and Atorvastatin 40 mg QD; would avoid ASA given recent GIB   -unable to initiate GDMT for HFrEF given soft BP, will attempt to resume home beta-blocker, ACE-I/ARB and potassium sparing agent when able and ischemic evaluation at future time point  -continue to monitor on telemetry  -obtain 2D echo with CFD in the AM     Thank you for your consult. Plan discussed with on call Cardiology attending.     Gabriella Ho MD  Cardiology Fellow, PGY-4  [facility]

## 2019-03-30 NOTE — HPI
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 At OSH, patient's cultures grew Rothia species, Enterobacter and MSSA. Her peritoneal catheter was removed she was transition to hemodialysis via temporarily femoral line. Cardiology consult service saw pt at OSH and had recommended holding Plavix and GDMT given soft BP's. During her [place] at [facility], patient was started on HD. Overnight, on 3/28-3/29, after 1 hr of treatment the patient became tachycardic, hypotensive and complained of chills/shivering. Rapid response was called and HD was stopped with resolution of symptoms soon after. EKG at that time showed sinus tachycardia. Labs were drawn and troponin was found to be 0.2 and then on serial checks estela to 0.9-1 range prompting Cardiology consult.     Cardiac Stress Test (2017):   Findings:   1. The EKG portion of this study is negative for ischemia at a peak heart rate of 104 bpm (64% of predicted).   2. Blood pressure remained stable throughout the protocol  (Presenting BP: 161/81 Peak BP: 170/69).   3. No significant arrhythmias were present.   4. There were no symptoms of chest discomfort or significant dyspnea throughout the protocol.   5. Nuclear portion was negative for ischemia or infarct. Wall motion is normal. Global LV systolic function is normal with LV EF of 55% and no evidence of LV dilation.     TTE (Feb 2019):   · Moderately decreased left ventricular systolic function. The estimated ejection fraction is 30%  · Grade II (moderate) left ventricular diastolic  dysfunction consistent with pseudonormalization.  · Elevated left atrial pressure.  · Normal right ventricular systolic function.  · Mild aortic valve stenosis.  · Aortic valve area is 1.68 cm2; peak velocity is 2.28 m/s; mean gradient is 12 mmHg.  · Normal central venous pressure (3 mm Hg).  · The estimated PA systolic pressure is 27 mm Hg

## 2019-03-30 NOTE — PROGRESS NOTES
Patient currently on hemodialysis for removal of uremic toxins and volume control.   I personally saw and examined the patient on hemodialysis.  The patient is tolerating the treatment, see hemodyalisis flow sheet for vitals and assessemts. I also reviewed the chart and current medication. The dialysis bath was adjusted.     Volume management/HTN: 2 liter     Problems with clotting on dialysis even with more flushes. Did not give epo because of recent GI bleed.

## 2019-03-30 NOTE — PROGRESS NOTES
HD treatment started. Left femoral catheter sluggish to aspirated, but flushed well. Catheter flushed with NS. Blood flow rate 275. Lines secured and telemetry in place. No complaints of discomfort at this time.

## 2019-03-30 NOTE — SUBJECTIVE & OBJECTIVE
Interval History:   Symptoms:  Doing well.  Diet:  Adequate intake.   Activity level:   Returning to normal.  Pain:  No pain.       Review of Systems   Constitutional: Negative for chills and fever.   Respiratory: Negative for shortness of breath.    Cardiovascular: Negative for chest pain.   Gastrointestinal: Negative for abdominal pain.     Objective:     Vital Signs (Most Recent):  Temp: 98.2 °F (36.8 °C) (03/30/19 1250)  Pulse: 98 (03/30/19 1250)  Resp: 14 (03/30/19 1250)  BP: 100/66 (03/30/19 1250)  SpO2: 100 % (03/30/19 1250) Vital Signs (24h Range):  Temp:  [97.3 °F (36.3 °C)-98.6 °F (37 °C)] 98.2 °F (36.8 °C)  Pulse:  [] 98  Resp:  [14-18] 14  SpO2:  [97 %-100 %] 100 %  BP: ()/(51-87) 100/66     Weight: 93.1 kg (205 lb 4 oz)  Body mass index is 32.15 kg/m².    Intake/Output Summary (Last 24 hours) at 3/30/2019 1408  Last data filed at 3/30/2019 0500  Gross per 24 hour   Intake 240 ml   Output --   Net 240 ml      Physical Exam   Constitutional: No distress.   Neck: No JVD present.   Cardiovascular: Normal rate, regular rhythm and normal heart sounds.   Pulmonary/Chest: Effort normal and breath sounds normal. No respiratory distress.   Abdominal: Soft. Bowel sounds are normal. She exhibits no distension.   Musculoskeletal: She exhibits no edema.   Neurological: She is alert.   Psychiatric: She has a normal mood and affect.       Significant Labs:  CBC:  Recent Labs   Lab 03/29/19  0421 03/30/19  0654   WBC 10.91 7.78   HGB 10.1* 9.0*   HCT 32.5* 29.0*    295     CMP:  Recent Labs   Lab 03/29/19  0421 03/30/19  0654    135*   K 4.6 5.0    100   CO2 18* 20*   * 134*   BUN 39* 47*   CREATININE 8.8* 10.7*   CALCIUM 9.1 9.4   PROT 7.5 7.0   ALBUMIN 1.8* 1.7*   BILITOT 0.3 0.2   ALKPHOS 123 124   AST 11 14   ALT 8* 8*   ANIONGAP 17* 15   EGFRNONAA 4.8* 3.8*

## 2019-03-30 NOTE — SUBJECTIVE & OBJECTIVE
Past Medical History:   Diagnosis Date    Abnormal finding on Pap smear, HPV DNA positive     Anemia associated with chronic renal failure     on Epogen    Blood type B+     Bulging discs - symptomatic      CAD (coronary artery disease)     Cardiomyopathy 3/13/2019    Diabetes mellitus, type 2     ESRD (end stage renal disease) 2004    FSGS (focal segmental glomerulosclerosis)     with collapsing    Hyperlipidemia     Hypertension     Neuropathy     Obesity     Secondary hyperparathyroidism, renal     TIA (transient ischemic attack)     Uterine fibroid     small uterine        Past Surgical History:   Procedure Laterality Date    CARDIAC CATHETERIZATION      PCI x 2     SECTION, CLASSIC      COLONOSCOPY N/A 2018    Performed by Rodrigue Russell MD at [facility] ENDO (2ND FLR)    DEBRIDEMENT-WOUND Left 2016    Performed by Teressa Maddox MD at [facility] OR    DIALYSIS FISTULA CREATION      multiple fistulas and grafts before PD     EGD (ESOPHAGOGASTRODUODENOSCOPY) N/A 3/14/2019    Performed by Adolph Davila MD at [facility] ENDO    EGD (ESOPHAGOGASTRODUODENOSCOPY) N/A 3/13/2019    Performed by Adolph Davila MD at [facility] ENDO    INCISION AND DRAINAGE (I&D), LABIAL N/A 2016    Performed by Harmony Fernandez MD at [facility] OR    INCISION AND DRAINAGE (I&D), LABIAL (ADD ON) Left 2016    Performed by Teressa Maddox MD at [facility] OR    INCISION AND DRAINAGE OF WOUND Left     VULVAR ABCESS WITH NECROSIS    Insertion, Catheter, Central Venous, Hemodialysis N/A 3/28/2019    Performed by Kimo Weeks MD at [facility] CATH LAB    Insertion, Catheter, Central Venous, Hemodialysis N/A 3/18/2019    Performed by Kimo Weeks MD at [facility] CATH LAB    INSERTION, CATHETER, TUNNELED ABORTED Left 3/14/2019    Performed by Servando Solis MD at [facility] OR    INSERTION, GRAFT, ARTERIOVENOUS, RIGHT ARM Right 3/22/2019    Performed by BESSIE Wynn III, MD at [facility] OR  2ND FLR    ORIF, HIP Left 9/13/2018    Performed by Tevin Grullon MD at [facility] OR    PERITONEAL CATHETER INSERTION      PERMCATH REWIRE- TUNNELED CATH REWIRE Left 11/13/2017    Performed by Baldev Munroe MD at [facility] CATH LAB    PERMCATH REWIRE- TUNNELED CATH REWIRE N/A 10/5/2017    Performed by Baldev Munroe MD at [facility] CATH LAB    REMOVAL, CATHETER, DIALYSIS, PERITONEAL N/A 3/14/2019    Performed by Servando Solis MD at [facility] OR    UMBILICAL HERNIA REPAIR      Venogram, possible intervention Right 3/20/2019    Performed by BESSIE Wynn III, MD at [facility] CATH LAB       Review of patient's allergies indicates:   Allergen Reactions    Clindamycin Diarrhea    Flagyl [metronidazole hcl]     Metronidazole        No current facility-administered medications on file prior to encounter.      Current Outpatient Medications on File Prior to Encounter   Medication Sig    aspirin (ECOTRIN) 81 MG EC tablet Take 1 tablet (81 mg total) by mouth once daily.    atorvastatin (LIPITOR) 40 MG tablet Take 40 mg by mouth every evening.     epoetin adonay 10,000 unit/mL Soln 1 mL, epoetin adonay 20,000 unit/mL Soln 1 mL Inject 30,000 Units into the vein every 14 (fourteen) days.    gabapentin (NEURONTIN) 100 MG capsule 1 capsule 3 (three) times daily.    nateglinide (STARLIX) 120 MG tablet STARLIX 120MG 30 MINUTES BEFORE EACH MEAL.    ONETOUCH VERIO Strp USE 1 STRIP ONCE DAILY IN VITRO    vitamin renal formula, B-complex-vitamin c-folic acid, (B COMPLEX-C-FOLIC ACID) 1 mg Cap Take 1 capsule by mouth once daily. 1 Capsule Oral Every day     Family History     Problem Relation (Age of Onset)    Cancer Maternal Grandmother, Paternal Grandfather    Diabetes Maternal Aunt, Paternal Aunt        Tobacco Use    Smoking status: Never Smoker    Smokeless tobacco: Never Used   Substance and Sexual Activity    Alcohol use: No     Comment: Pt reports some social use of about 1-2 drinks about every six months.    Drug use: No     Sexual activity: Not Currently     Partners: Male     Birth control/protection: None     Review of Systems   Constitution: Negative for malaise/fatigue and weight gain.   Cardiovascular: Negative for chest pain, dyspnea on exertion, irregular heartbeat, leg swelling, near-syncope, orthopnea, palpitations, paroxysmal nocturnal dyspnea and syncope.   Respiratory: Negative for cough and shortness of breath.    Gastrointestinal: Negative for abdominal pain, nausea and vomiting.   Neurological: Negative for dizziness, focal weakness, headaches, light-headedness and weakness.     Objective:     Vital Signs (Most Recent):  Temp: 98.2 °F (36.8 °C) (03/29/19 2028)  Pulse: 88 (03/29/19 2028)  Resp: 18 (03/29/19 2028)  BP: 102/64 (03/29/19 2028)  SpO2: 100 % (03/29/19 2028) Vital Signs (24h Range):  Temp:  [97.3 °F (36.3 °C)-99.2 °F (37.3 °C)] 98.2 °F (36.8 °C)  Pulse:  [] 88  Resp:  [16-20] 18  SpO2:  [97 %-100 %] 100 %  BP: ()/(50-94) 102/64     Weight: 93.1 kg (205 lb 4 oz)  Body mass index is 32.15 kg/m².    SpO2: 100 %  O2 Device (Oxygen Therapy): room air      Intake/Output Summary (Last 24 hours) at 3/29/2019 2250  Last data filed at 3/29/2019 1134  Gross per 24 hour   Intake 940 ml   Output 987 ml   Net -47 ml       Lines/Drains/Airways     Central Venous Catheter Line                 Hemodialysis Catheter 03/28/19 1528 left femoral 1 day          Drain                 Hemodialysis AV Fistula Left upper arm -- days         Hemodialysis AV Graft Right upper arm -- days          Peripheral Intravenous Line                 Peripheral IV - Single Lumen 03/28/19 0600 Anterior;Left Forearm 1 day                Physical Exam   Constitutional: She is oriented to person, place, and time.   HENT:   Mouth/Throat: Oropharynx is clear and moist.   Eyes: Pupils are equal, round, and reactive to light.   Neck: No JVD present.   Cardiovascular: Normal rate, regular rhythm, normal heart sounds and intact distal pulses.    Pulmonary/Chest: Effort normal and breath sounds normal.   Abdominal: Soft. She exhibits no distension. There is no tenderness.   Musculoskeletal: She exhibits no edema.   Neurological: She is alert and oriented to person, place, and time.   Skin: Skin is warm and dry.   Vitals reviewed.      Significant Labs: All pertinent lab results from the last 24 hours have been reviewed.    Significant Imaging: Echocardiogram:   Transthoracic echo (TTE) complete (Cupid Only):   Results for orders placed or performed during the hospital encounter of 02/21/19   Transthoracic echo (TTE) complete (Cupid Only)   Result Value Ref Range    AORTIC VALVE CUSP SEPERATION 1.13 cm    PV PEAK VELOCITY 1.14 cm/s    LVIDD 4.93 3.5 - 6.0 cm    IVS 1.21 (A) 0.6 - 1.1 cm    PW 1.21 (A) 0.6 - 1.1 cm    Ao root annulus 2.93 cm    LVIDS 3.48 2.1 - 4.0 cm    FS 29 28 - 44 %    LV mass 231.27 g    LA size 4.41 cm    RVDD 2.63 cm    Left Ventricle Relative Wall Thickness 0.49 cm    AV mean gradient 12 mmHg    AV valve area 1.68 cm2    AV Velocity Ratio 0.50     AV index (prosthetic) 0.53     E/A ratio 1.37     E wave decelartion time 158.30 msec    IVRT 0.06 msec    LVOT diameter 2.01 cm    LVOT area 3.17 cm2    LVOT peak otoniel [id number] m/s    LVOT peak VTI 20.00 cm    Ao peak otoniel 2.28 m/s    Ao VTI 37.80 cm    LVOT stroke volume 63.43 cm3    AV peak gradient 20.79 mmHg    MV Peak E Otoniel 0.96 m/s    TR Max Otoniel 2.47 m/s    MV Peak A Otoniel 0.70 m/s    LV Systolic Volume 50.00 mL    LV Systolic Volume Index 23.5 mL/m2    LV Diastolic Volume 114.52 mL    LV Diastolic Volume Index 53.86 mL/m2    LV Mass Index 108.8 g/m2    Triscuspid Valve Regurgitation Peak Gradient 24.40 mmHg    LV LATERAL E/E' RATIO 12.00     Right Atrial Pressure (from IVC) 3 mmHg    TDI LATERAL 0.08     TV rest pulmonary artery pressure 27 mmHg

## 2019-03-31 NOTE — PLAN OF CARE
Problem: Adult Inpatient Plan of Care  Goal: Plan of Care Review  Outcome: Ongoing (interventions implemented as appropriate)  Patient AAOx4. Vitals remain stable. Continuous cardiac monitoring in place per MD orders; NSR noted. No complaints of pain this shift. Will continue to monitor.    Problem: Diabetes Comorbidity  Goal: Blood Glucose Level Within Desired Range  Outcome: Ongoing (interventions implemented as appropriate)  Glucose checked HS per MD orders. No s/s of hypoglycemia noted.     Problem: Fall Injury Risk  Goal: Absence of Fall and Fall-Related Injury  Outcome: Ongoing (interventions implemented as appropriate)  Patient remains free from falls/injury throughout shift. Bed in lowest position. Call light within reach. Patient able to make needs known to staff.    Problem: Skin Injury Risk Increased  Goal: Skin Health and Integrity  Outcome: Ongoing (interventions implemented as appropriate)  Wound to right buttock cleansed and triad ointment applied prn this shift.

## 2019-03-31 NOTE — SUBJECTIVE & OBJECTIVE
Interval History:   Symptoms:  Doing well.  Diet:  Adequate intake.   Activity level:   Returning to normal.  Pain:  No pain.       Review of Systems   Constitutional: Negative for chills and fever.   Respiratory: Negative for shortness of breath.    Cardiovascular: Negative for chest pain.   Gastrointestinal: Negative for abdominal pain.     Objective:     Vital Signs (Most Recent):  Temp: 99.1 °F (37.3 °C) (03/31/19 0826)  Pulse: 97 (03/31/19 1100)  Resp: 18 (03/31/19 0826)  BP: (!) 116/58 (03/31/19 0826)  SpO2: 100 % (03/31/19 0826) Vital Signs (24h Range):  Temp:  [98.2 °F (36.8 °C)-99.3 °F (37.4 °C)] 99.1 °F (37.3 °C)  Pulse:  [] 97  Resp:  [14-19] 18  SpO2:  [98 %-100 %] 100 %  BP: ()/(53-69) 116/58     Weight: 93 kg (205 lb)  Body mass index is 32.11 kg/m².    Intake/Output Summary (Last 24 hours) at 3/31/2019 1132  Last data filed at 3/31/2019 0400  Gross per 24 hour   Intake 360 ml   Output --   Net 360 ml      Physical Exam   Constitutional: No distress.   Neck: No JVD present.   Cardiovascular: Normal rate, regular rhythm and normal heart sounds.   Pulmonary/Chest: Effort normal and breath sounds normal. No respiratory distress.   Abdominal: Soft. Bowel sounds are normal. She exhibits no distension.   Musculoskeletal: She exhibits no edema.   Neurological: She is alert.   Psychiatric: She has a normal mood and affect.       Significant Labs:  CBC:  Recent Labs   Lab 03/30/19  0654 03/31/19  0730   WBC 7.78 7.65   HGB 9.0* 8.7*   HCT 29.0* 29.3*    293     CMP:  Recent Labs   Lab 03/30/19  0654 03/31/19  0730   * 136   K 5.0 4.3    99   CO2 20* 26   * 120*   BUN 47* 29*   CREATININE 10.7* 8.0*   CALCIUM 9.4 9.0   PROT 7.0 6.5   ALBUMIN 1.7* 1.6*   BILITOT 0.2 0.2   ALKPHOS 124 114   AST 14 12   ALT 8* 8*   ANIONGAP 15 11   EGFRNONAA 3.8* 5.4*

## 2019-03-31 NOTE — PROGRESS NOTES
[facility] Hospital Medicine  Progress Note    Patient Name: Jenni Todd  MRN: [id number]  Patient Class: IP- Inpatient   Admission Date: 3/18/2019  Length of Stay: 13 days  Attending Physician: Sung Sr MD  Primary Care Provider: Michael Tan Iii, MD    [facility] Medicine Team: [facility] A Sung Sr MD    Subjective:     Principal Problem:ESRD (end stage renal disease)    HPI:  Jenni Todd is a 48 y.o. female w/ CAD s/p 2 PCI (2012), HFrEF 30%, HTN, DM, and ESRD on home PD admitted to [facility] on 2/21 for peritonitis. PD catheter removed. Due to hx of recurrent peritonitis she was transitioned to HD.         Hospital Course:  Bilateral IJ tunneled catheters were attempted without success. Now has a temporary dialysis catheter in her left fem. Transferred to [facility]. Nephrology access team attempted a tunneled line and were unsuccessful.  Vascular surgery consulted for HD access. Hospital course complicated by GIB with low risk ulcer. S/p RUE Graft 3/22.     Interval History:   Symptoms:  Doing well.  Diet:  Adequate intake.   Activity level:   Returning to normal.  Pain:  No pain.       Review of Systems   Constitutional: Negative for chills and fever.   Respiratory: Negative for shortness of breath.    Cardiovascular: Negative for chest pain.   Gastrointestinal: Negative for abdominal pain.     Objective:     Vital Signs (Most Recent):  Temp: 99.1 °F (37.3 °C) (03/31/19 0826)  Pulse: 97 (03/31/19 1100)  Resp: 18 (03/31/19 0826)  BP: (!) 116/58 (03/31/19 0826)  SpO2: 100 % (03/31/19 0826) Vital Signs (24h Range):  Temp:  [98.2 °F (36.8 °C)-99.3 °F (37.4 °C)] 99.1 °F (37.3 °C)  Pulse:  [] 97  Resp:  [14-19] 18  SpO2:  [98 %-100 %] 100 %  BP: ()/(53-69) 116/58     Weight: 93 kg (205 lb)  Body mass index is 32.11 kg/m².    Intake/Output Summary (Last 24 hours) at 3/31/2019 1132  Last data filed at 3/31/2019 0400  Gross per 24 hour   Intake  "360 ml   Output --   Net 360 ml      Physical Exam   Constitutional: No distress.   Neck: No JVD present.   Cardiovascular: Normal rate, regular rhythm and normal heart sounds.   Pulmonary/Chest: Effort normal and breath sounds normal. No respiratory distress.   Abdominal: Soft. Bowel sounds are normal. She exhibits no distension.   Musculoskeletal: She exhibits no edema.   Neurological: She is alert.   Psychiatric: She has a normal mood and affect.       Significant Labs:  CBC:  Recent Labs   Lab 03/30/19  0654 03/31/19  0730   WBC 7.78 7.65   HGB 9.0* 8.7*   HCT 29.0* 29.3*    293     CMP:  Recent Labs   Lab 03/30/19  0654 03/31/19  0730   * 136   K 5.0 4.3    99   CO2 20* 26   * 120*   BUN 47* 29*   CREATININE 10.7* 8.0*   CALCIUM 9.4 9.0   PROT 7.0 6.5   ALBUMIN 1.7* 1.6*   BILITOT 0.2 0.2   ALKPHOS 124 114   AST 14 12   ALT 8* 8*   ANIONGAP 15 11   EGFRNONAA 3.8* 5.4*         Assessment/Plan:      * ESRD (end stage renal disease) on PD ( initiated dialysis 04/20/2004)  Now on Hemodialysis.  Status post removal of PD catheter for concern regarding mutliple organsims growing in peritoneal fluid. Attempts at tunneled catheter in IJ x 2 during this admission.   Left femoral trialysis placed. S/p OR 3/22 per vascular for RUE graft (Arsen)  3/28 groin perm-cath placed  - Nephrology following, ESRD mgmt as per nephro  - No IVs in R arm  - RUQ graft is clotted per US; Surgery wish to fix it as an outpt.  Surgery does not think pt has good anatomy for vascular acces and wish for her to see Dr. Alonso for new PD placement in clinic.     NSTEMI (non-ST elevated myocardial infarction)  3/29 Tn 0.2>1.0  after a tachy followed by hypotensive episode the day after her groin perm-cath was placed.  cards consulted 3/29 "recommend continuing medical therapy with Plavix 75 mg QD and Atorvastatin 40 mg QD; would avoid ASA given recent GIB .  -unable to initiate GDMT for HFrEF given soft BP, will " "attempt to resume home beta-blocker, ACE-I/ARB and potassium sparing agent when able and ischemic evaluation at future time point"  - blood culture collected in case of infection from recent procedure; NGTD       Acute gastric ulcer with hemorrhage  3/13/2019 had GI bleed requiring 2 uPRBC.  no more bleeding, biopsy neg for malignancy or H.pylori  - on PPI        Chronic systolic heart failure  Chronic systolic heart failure  Hypertension with hypotension  EF 30% on TTE, stable  - Hold antihypertensive due to low BP, resume as able, can be done OP      Renovascular hypertension  Hypotension - resolved  Antibiotic-associated diarrhea - resolved, now soft stool - resolved.  Has been C diff negative.    Multiple episodes of hypotension suspecting from recurrent diarrhea, dialysis and maybe PVD  - Fluids prn and loperamide, continue to monitor   - Not on any antihypertensives at this time and HH stable         Other specified anemias  -- chronic and stable; continue with home treatments & monitor       Pressure injury of left buttock, stage 2  - consulted wound care      Peritonitis associated with peritoneal dialysis  Cefepime given until March 28th (10 days)  - Wound care consulted for PD site wound care        Type 2 diabetes 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, with long-term current use of insulin  A1c 6.3, was getting intra-peritoneal insulin. Catheter now removed.   - SSI for now       CAD (coronary artery disease)  remote stents.   - Was on DAPT but held due to GIB 3/13 and need for procedures here. Only plavix restarted given recent GIB per cards recs 3/29.    - holding BB due to recent low BP        VTE Risk Mitigation (From admission, onward)        Ordered     heparin (porcine) injection 1,000 Units  As needed (PRN)      03/30/19 1154     heparin infusion 1,000 units/500 ml in 0.9% NaCl (pressure line flush)  Intra-op continuous PRN      03/18/19 1016     IP VTE LOW RISK PATIENT  Once   "    03/18/19 0212     Place sequential compression device  Until discontinued      03/18/19 0212              Sung Sr MD  Department of [facility] Medicine   [facility]

## 2019-03-31 NOTE — PLAN OF CARE
Problem: Adult Inpatient Plan of Care  Goal: Plan of Care Review  Outcome: Ongoing (interventions implemented as appropriate)  Pt free of falls/injuries throughout the shift. Bed locked, in lowest position, call bell within reach. Pt afebrile, pain assessed & denied. VSS, pt in no distress, will continue to monitor.

## 2019-04-01 NOTE — PLAN OF CARE
04/01/19 1631   Post-Acute Status   Post-Acute Authorization Placement;Dialysis   Post-Acute Placement Status Referrals Sent  ([facility])   Diaylsis Status Referrals Sent  ([facility])

## 2019-04-01 NOTE — SUBJECTIVE & OBJECTIVE
Interval History:   Symptoms:  Doing well.  Diet:  Adequate intake.   Activity level:   Returning to normal.  Pain:  No pain.       Review of Systems   Constitutional: Negative for chills and fever.   Respiratory: Negative for shortness of breath.    Cardiovascular: Negative for chest pain.   Gastrointestinal: Negative for abdominal pain.     Objective:     Vital Signs (Most Recent):  Temp: 98.4 °F (36.9 °C) (04/01/19 1000)  Pulse: 100 (04/01/19 1129)  Resp: 18 (04/01/19 1000)  BP: 114/63 (04/01/19 1000)  SpO2: 100 % (04/01/19 1000) Vital Signs (24h Range):  Temp:  [98.2 °F (36.8 °C)-98.7 °F (37.1 °C)] 98.4 °F (36.9 °C)  Pulse:  [] 100  Resp:  [17-20] 18  SpO2:  [97 %-100 %] 100 %  BP: (110-134)/(57-77) 114/63     Weight: 93 kg (205 lb)  Body mass index is 32.11 kg/m².    Intake/Output Summary (Last 24 hours) at 4/1/2019 1208  Last data filed at 4/1/2019 1000  Gross per 24 hour   Intake 720 ml   Output 400 ml   Net 320 ml      Physical Exam   Constitutional: No distress.   Neck: No JVD present.   Cardiovascular: Normal rate, regular rhythm and normal heart sounds.   Pulmonary/Chest: Effort normal and breath sounds normal. No respiratory distress.   Abdominal: Soft. Bowel sounds are normal. She exhibits no distension.   Musculoskeletal: She exhibits no edema.   Neurological: She is alert.   Psychiatric: She has a normal mood and affect.       Significant Labs:  CBC:  Recent Labs   Lab 03/31/19 0730 04/01/19  0736   WBC 7.65 8.60   HGB 8.7* 8.8*   HCT 29.3* 28.7*    361*     CMP:  Recent Labs   Lab 03/31/19 0730 04/01/19  0736    136   K 4.3 4.7   CL 99 98   CO2 26 25   * 113*   BUN 29* 36*   CREATININE 8.0* 9.1*   CALCIUM 9.0 9.3   PROT 6.5  --    ALBUMIN 1.6* 1.7*   BILITOT 0.2  --    ALKPHOS 114  --    AST 12  --    ALT 8*  --    ANIONGAP 11 13   EGFRNONAA 5.4* 4.6*

## 2019-04-01 NOTE — PT/OT/SLP PROGRESS
Occupational Therapy   Treatment    Name: Jenni Todd  MRN: [id number]  Admitting Diagnosis:  ESRD (end stage renal disease)  4 Days Post-Op    Recommendations:     Discharge Recommendations: nursing facility, skilled  Discharge Equipment Recommendations:  commode  Barriers to discharge:  Decreased caregiver support    Assessment:     Jenni Todd is a 48 y.o. female with a medical diagnosis of ESRD (end stage renal disease).  She presents with fear of falling, poor endurance, and overall weekness.. Performance deficits affecting function are weakness, impaired endurance, impaired sensation, gait instability, impaired balance, decreased lower extremity function, edema, impaired cardiopulmonary response to activity.     Rehab Prognosis:  Fair; patient would benefit from acute skilled OT services to address these deficits and reach maximum level of function.       Plan:     Patient to be seen 3 x/week to address the above listed problems via self-care/home management, therapeutic activities, therapeutic exercises  · Plan of Care Expires: 04/23/19  · Plan of Care Reviewed with: patient    Subjective     Pain/Comfort:  · Pain Rating 1: 0/10  · Pain Rating Post-Intervention 1: 0/10    Objective:     Communicated with: RN prior to session.  Patient found HOB elevated with telemetry upon OT entry to room.    General Precautions: Standard, fall   Orthopedic Precautions:N/A   Braces: N/A     Occupational Performance:     Bed Mobility:    · Patient completed Rolling/Turning to Left with  independence  · Patient completed Rolling/Turning to Right with independence  · Patient completed Scooting/Bridging with independence  · Patient completed Supine to Sit with independence  · Patient completed Sit to Supine with independence     Functional Mobility/Transfers:  · Patient completed Sit <> Stand Transfer with stand by assistance  with  hand-held assist   · Patient completed Toilet Transfer Step Transfer technique with  contact guard assistance and of 1 persons with  hand-held assist  · Functional Mobility: Able to walk short household distances to/from toilet CGA w/ no AD.      Activities of Daily Living:  · Grooming: independence seated at EOB to brush hair and at sink to brush teeth.   · Bathing: modified independence seated on commode chair over toilet to walk hands and face in sink.   · Lower Body Dressing: moderate assistance Raised leg up to knee leave before loosing seated balance.   · Toileting: modified independence seated on commode above toilet for balance and low surface height.       [facility] 6 Click ADL: 22    Treatment & Education:  -Pt edu on OT role/POC  -Importance of OOB activity with staff assistance  -Safety during functional t/f and mobility  - White board updated  - Multiple self care tasks/functional mobility completed-- assistance level noted above  - All questions/concerns answered within OT scope of practice      Patient left HOB elevated with all lines intact, call button in reach, RN notified and family presentEducation:      GOALS:   Multidisciplinary Problems     Occupational Therapy Goals        Problem: Occupational Therapy Goal    Goal Priority Disciplines Outcome Interventions   Occupational Therapy Goal     OT, PT/OT Ongoing (interventions implemented as appropriate)    Description:    Updated Goals to be met by: 4/16/19   UE Dressing with Supervision.  LE Dressing with Moderate Assistance. MET 4/1  Grooming while standing at sink with Stand-by Assistance. PROGRESSING  Toileting from toilet with Supervision for hygiene and clothing management. MET 4/1 W/ COMMODE OVER TOILET  Supine to sit with Minimal Assistance.  MET 4/1 MIRZA  Step transfer with Stand-by Assistance PROGRESSING  Toilet transfer to toilet with Minimal Assistance. MET 4/1W/ COMMODE OVER TOILET        Goals to be met by: 3/31/19 0/7 GOALS MET    Patient will increase functional independence with ADLs by performing:    UE Dressing with  Supervision.  LE Dressing with Moderate Assistance.  Grooming while standing at sink with Stand-by Assistance.  Toileting from toilet with Supervision for hygiene and clothing management.   Supine to sit with Minimal Assistance.  Step transfer with Stand-by Assistance  Toilet transfer to toilet with Minimal Assistance. Progressing                           Time Tracking:     OT Date of Treatment: 04/01/19  OT Start Time: 1202  OT Stop Time: 1232  OT Total Time (min): 30 min    Billable Minutes:Self Care/Home Management 30 mins    Lexx Shore, OT  4/1/2019

## 2019-04-01 NOTE — PLAN OF CARE
04/01/19 1038   Discharge Reassessment   Assessment Type Discharge Planning Reassessment   Provided patient/caregiver education on the expected discharge date and the discharge plan No  (Per MD)   Do you have any problems affording any of your prescribed medications? No   Discharge Plan A Skilled Nursing Facility   Discharge Plan B Home with family;Home Health   DME Needed Upon Discharge  other (see comments)  (TBD)   Patient choice form signed by patient/caregiver N/A   Anticipated Discharge Disposition SNF   Can the patient answer the patient profile reliably? Yes, cognitively intact   How does the patient rate their overall health at the present time? Fair     CM to the Bedside to see the patient. She was notified that the CM team continues to follow for D/C needs and/or recommendations. She was also notified that as of this morning the MD states she is medically ready and the CM team can move forward with placement. [facility] continues to follow. D/C plan at this time is SNF placement. CM name and number remain on the board at the Bedside for the patient or family to call with D/C needs or questions.

## 2019-04-01 NOTE — PLAN OF CARE
Covering crista sent message to Mera with [facility] to f/u on referral, as pt has been denied by [facility] SN[facility] due to HD need. CRISTA also faxed referral to [facility] intake to set up HD chair at [facility] in the event pt can transfer to [facility].    Emely Burton, LCSW [phone]

## 2019-04-01 NOTE — PLAN OF CARE
Problem: Occupational Therapy Goal  Goal: Occupational Therapy Goal    Updated Goals to be met by: 4/16/19   UE Dressing with Supervision.  LE Dressing with Moderate Assistance. MET 4/1  Grooming while standing at sink with Stand-by Assistance. PROGRESSING  Toileting from toilet with Supervision for hygiene and clothing management. MET 4/1 W/ COMMODE OVER TOILET  Supine to sit with Minimal Assistance.  MET 4/1 MIRZA  Step transfer with Stand-by Assistance PROGRESSING  Toilet transfer to toilet with Minimal Assistance. MET 4/1W/ COMMODE OVER TOILET        Goals to be met by: 3/31/19 0/7 GOALS MET    Patient will increase functional independence with ADLs by performing:    UE Dressing with Supervision.  LE Dressing with Moderate Assistance.  Grooming while standing at sink with Stand-by Assistance.  Toileting from toilet with Supervision for hygiene and clothing management.   Supine to sit with Minimal Assistance.  Step transfer with Stand-by Assistance  Toilet transfer to toilet with Minimal Assistance. Progressing         Outcome: Ongoing (interventions implemented as appropriate)  Goals are reviewed and updated; still appropriate, continue w/ OT POC.

## 2019-04-01 NOTE — PLAN OF CARE
Cardiology plan of care    Patient seen and examined. Had some TWI with hypotensive episode during dialysis, history of CAD s/p PCI. Her TWI have resolved. Troponin continues to trend down. She denies any chest pain throughout entire episode.    Cardiology Will sign off.    Please ensure she follows up with her Cardiologist, Dr Calos Gavin on discharge to set up a stress test.    Mine Rowan MD ([place])  Cardiology Fellow  PGY 4  [facility]

## 2019-04-01 NOTE — PROGRESS NOTES
[facility] Hospital Medicine  Progress Note    Patient Name: Jenni Todd  MRN: [id number]  Patient Class: IP- Inpatient   Admission Date: 3/18/2019  Length of Stay: 14 days  Attending Physician: Sung Sr MD  Primary Care Provider: Michael Tan Iii, MD    [facility] Medicine Team: [facility] HOSP MED A Sung Sr MD    Subjective:     Principal Problem:ESRD (end stage renal disease)    HPI:  Jenni Todd is a 48 y.o. female w/ CAD s/p 2 PCI (2012), HFrEF 30%, HTN, DM, and ESRD on home PD admitted to [facility] on 2/21 for peritonitis. PD catheter removed. Due to hx of recurrent peritonitis she was transitioned to HD.         Hospital Course:  Bilateral IJ tunneled catheters were attempted without success. Now has a temporary dialysis catheter in her left fem. Transferred to [facility] for vascular. Nephrology access team attempted a tunneled line and were unsuccessful.  Vascular surgery consulted for HD access. Hospital course complicated by GIB with low risk ulcer. S/p RUE Graft 3/22.     Interval History:   Symptoms:  Doing well.  Diet:  Adequate intake.   Activity level:   Returning to normal.  Pain:  No pain.       Review of Systems   Constitutional: Negative for chills and fever.   Respiratory: Negative for shortness of breath.    Cardiovascular: Negative for chest pain.   Gastrointestinal: Negative for abdominal pain.     Objective:     Vital Signs (Most Recent):  Temp: 98.4 °F (36.9 °C) (04/01/19 1000)  Pulse: 100 (04/01/19 1129)  Resp: 18 (04/01/19 1000)  BP: 114/63 (04/01/19 1000)  SpO2: 100 % (04/01/19 1000) Vital Signs (24h Range):  Temp:  [98.2 °F (36.8 °C)-98.7 °F (37.1 °C)] 98.4 °F (36.9 °C)  Pulse:  [] 100  Resp:  [17-20] 18  SpO2:  [97 %-100 %] 100 %  BP: (110-134)/(57-77) 114/63     Weight: 93 kg (205 lb)  Body mass index is 32.11 kg/m².    Intake/Output Summary (Last 24 hours) at 4/1/2019 1208  Last data filed at 4/1/2019 1000  Gross per 24 hour   Intake 720  "ml   Output 400 ml   Net 320 ml      Physical Exam   Constitutional: No distress.   Neck: No JVD present.   Cardiovascular: Normal rate, regular rhythm and normal heart sounds.   Pulmonary/Chest: Effort normal and breath sounds normal. No respiratory distress.   Abdominal: Soft. Bowel sounds are normal. She exhibits no distension.   Musculoskeletal: She exhibits no edema.   Neurological: She is alert.   Psychiatric: She has a normal mood and affect.       Significant Labs:  CBC:  Recent Labs   Lab 03/31/19  0730 04/01/19  0736   WBC 7.65 8.60   HGB 8.7* 8.8*   HCT 29.3* 28.7*    361*     CMP:  Recent Labs   Lab 03/31/19  0730 04/01/19  0736    136   K 4.3 4.7   CL 99 98   CO2 26 25   * 113*   BUN 29* 36*   CREATININE 8.0* 9.1*   CALCIUM 9.0 9.3   PROT 6.5  --    ALBUMIN 1.6* 1.7*   BILITOT 0.2  --    ALKPHOS 114  --    AST 12  --    ALT 8*  --    ANIONGAP 11 13   EGFRNONAA 5.4* 4.6*         Assessment/Plan:      * ESRD (end stage renal disease) on PD ( initiated dialysis 04/20/2004)  Now on Hemodialysis.  Status post removal of PD catheter for concern regarding mutliple organsims growing in peritoneal fluid. Attempts at tunneled catheter in IJ x 2 during this admission.   Left femoral trialysis placed. S/p OR 3/22 per vascular for RUE graft (Arsen)  3/28 groin perm-cath placed  Pt refuses renal diet; prefers diabetic  - Nephrology following, ESRD mgmt as per nephro  - No IVs in R arm  - RUQ graft is clotted per US; Surgery wish to fix it as an outpt.  Surgery does not think pt has good anatomy for vascular acces and wish for her to see Dr. Alonso for new PD placement in clinic.     NSTEMI (non-ST elevated myocardial infarction)  3/29 Tn 0.2>1.0  after a tachy followed by hypotensive episode the day after her groin perm-cath was placed.  cards consulted 3/29 "recommend continuing medical therapy with Plavix 75 mg QD and Atorvastatin 40 mg QD; would avoid ASA given recent GIB .  -unable to " "initiate GDMT for HFrEF given soft BP, will attempt to resume home beta-blocker, ACE-I/ARB and potassium sparing agent when able and ischemic evaluation at future time point"  - blood culture collected in case of infection from recent procedure; NGTD   - per cards "Please ensure she follows up with her Cardiologist, Dr Calos Gavin on discharge to set up a stress test."          Acute gastric ulcer with hemorrhage  3/13/2019 had GI bleed requiring 2 uPRBC.  no more bleeding, biopsy neg for malignancy or H.pylori  - on PPI        Chronic systolic heart failure  Chronic systolic heart failure  Hypertension with hypotension  EF 30% on TTE, stable  - Hold antihypertensive due to low BP, resume as able, can be done OP      Renovascular hypertension  Hypotension - resolved  Antibiotic-associated diarrhea - resolved, now soft stool - resolved.  Has been C diff negative.    Multiple episodes of hypotension suspecting from recurrent diarrhea, dialysis and maybe PVD  - Fluids prn and loperamide, continue to monitor   - Not on any antihypertensives at this time and HH stable         Other specified anemias  -- chronic and stable; continue with home treatments & monitor       Pressure injury of left buttock, stage 2  - consulted wound care      Peritonitis associated with peritoneal dialysis  Cefepime given until March 28th (10 days)  - Wound care consulted for PD site wound care        Type 2 diabetes 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, with long-term current use of insulin  A1c 6.3, was getting intra-peritoneal insulin. Catheter now removed.   - SSI for now       CAD (coronary artery disease)  remote stents.   - Was on DAPT but held due to GIB 3/13 and need for procedures here. Only plavix restarted given recent GIB per cards recs 3/29.    - holding BB due to recent low BP        VTE Risk Mitigation (From admission, onward)        Ordered     heparin (porcine) injection 1,000 Units  As needed (PRN)      " 03/30/19 1154     heparin infusion 1,000 units/500 ml in 0.9% NaCl (pressure line flush)  Intra-op continuous PRN      03/18/19 1016     IP VTE LOW RISK PATIENT  Once      03/18/19 0212     Place sequential compression device  Until discontinued      03/18/19 0212              Sung Sr MD  Department of Hospital Medicine   [facility]

## 2019-04-02 NOTE — PROGRESS NOTES
HD treatment complete. Duration of treatment 3.5 hours and 500cc removed. Treatment was tolerated well. Catheter to left femoral flushed with Ns and locked with heparin. Capped and taped.

## 2019-04-02 NOTE — PLAN OF CARE
Problem: Skin Injury Risk Increased  Goal: Skin Health and Integrity  Outcome: Ongoing (interventions implemented as appropriate)  Patient abd. Dressing was changed yesterday as ordered, He buttock ulcer is a healing stage 2. With the triad cream being applied bid and prn. Patient continue to c/o having  A sore bottom. No redness seen. Patient had no bm's tonight.

## 2019-04-02 NOTE — PLAN OF CARE
SW met with pt, discussed multiple SNF denials and pending referral under review at [facility]. Pt said she may just need to d/c home with HH if [facility] cannot accept, but she is agreeable to sw sending more SNF referrals near her home.    Pt also said if she is to d/c home there is a new [facility] near her home at [facility].    SW agreed to fax HD referral to the [facility] in the event pt d/c home. KEELY also agreed to send referrals to [facility] and [facility].    SW will f/u with [facility] and [facility] tomorrow.    SW requested [facility] send referral to SNFs listed above.    SW faxed referral to [facility].    Emely Burton, STEVE [phone]

## 2019-04-02 NOTE — PROGRESS NOTES
"[facility]  Adult Nutrition  Progress Note    SUMMARY       Recommendations    Recommendation/Intervention:   1. Recommend adding low sodium restriction. Continue current 2000 diabetic diet.   2. Encourage intake of high protein foods for wound healing/HD needs. Consider ordering Beneprotein with meals.   3. RD following.    Goals: meet >85% EEN/EPN  Nutrition Goal Status: goal met  Communication of RD Recs: (POC)    Reason for Assessment    Reason For Assessment: RD follow-up  Diagnosis: (ESRD on PD)  Relevant Medical History: ESRD on PD, HTN, HLD, obesity, CAD, DM, CHF    General Information Comments: Pt RYAN for HD at time of visit. Continues with intake 50-75% of meals per RN documentation. Appears nourished.    Nutrition Discharge Planning: adequate po intake    Nutrition Risk Screen    Nutrition Risk Screen: no indicators present    Nutrition/Diet History    Spiritual, Cultural Beliefs, Druze Practices, Values that Affect Care: no  Factors Affecting Nutritional Intake: None identified at this time    Anthropometrics    Temp: 98 °F (36.7 °C)  Height Method: Stated  Height: 5' 7" (170.2 cm)  Height (inches): 67 in  Weight Method: Stated  Weight: 93 kg (205 lb)  Weight (lb): 205 lb  Ideal Body Weight (IBW), Female: 135 lb  % Ideal Body Weight, Female (lb): 151.85 lb  BMI (Calculated): 32.2  BMI Grade: 30 - 34.9- obesity - grade I  Weight Loss: unintentional  Usual Body Weight (UBW), k.1 kg(per chart review 18)  % Usual Body Weight: 91.38  % Weight Change From Usual Weight: -8.82 %       Lab/Procedures/Meds    Pertinent Labs Reviewed: reviewed  Pertinent Labs Comments: BUN 45, Crt 10.0, Glu 113, Phos 6.0, Alb 1.8  Pertinent Medications Reviewed: reviewed  Pertinent Medications Comments: statin, insulin, pantoprazole, sevelamer, Na bicarb    Estimated/Assessed Needs    Weight Used For Calorie Calculations: 93.1 kg (205 lb 4 oz)  Energy Calorie Requirements (kcal):  " kcal/day  Energy Need Method: Kcal/kg(x 1.25)  Protein Requirements: 112-121 gm/day(1.2-1.3 gm/kg)  Weight Used For Protein Calculations: 93.1 kg (205 lb 4 oz)  Fluid Requirements (mL): 1 mL/kcal or per MD     RDA Method (mL): 1985  CHO Requirement: 50% kcal from CHO    Nutrition Prescription Ordered    Current Diet Order: 2000 diabetic    Evaluation of Received Nutrient/Fluid Intake    Comments: LBM 4/1 x 3  Tolerance: tolerating  % Intake of Estimated Energy Needs: 50 - 75 %  % Meal Intake: 50 - 75 %    Nutrition Risk    Level of Risk/Frequency of Follow-up: low     Assessment and Plan    Nutrition Problem  Increased protein needs    Related to (etiology):   Wound healing and increased demand due to protein losses    Signs and Symptoms (as evidenced by):   St 2 pressure ulcer, ESRD on HD     Interventions/Recommendations (treatment strategy):  Increased protein diet    Nutrition Diagnosis Status:   New    Monitor and Evaluation    Food and Nutrient Intake: energy intake, food and beverage intake  Food and Nutrient Adminstration: diet order  Physical Activity and Function: nutrition-related ADLs and IADLs  Anthropometric Measurements: weight, weight change, body mass index  Biochemical Data, Medical Tests and Procedures: electrolyte and renal panel, gastrointestinal profile, glucose/endocrine profile, inflammatory profile, lipid profile  Nutrition-Focused Physical Findings: overall appearance     Nutrition Follow-Up    RD Follow-up?: Yes

## 2019-04-02 NOTE — PLAN OF CARE
Problem: Physical Therapy Goal  Goal: Physical Therapy Goal  Goals to be met by: 2019     Patient will increase functional independence with mobility by performin. Supine to sit with Modified [place]  2. Sit to supine with Modified [place]  3. Sit to stand transfer with Modified [place]  4. Gait  x 150 feet with Supervision using Rolling Walker.   5. Ascend/Descend 4 inch curb step with Supervision using Rolling Walker.        Discharge Recommendations: Short Stay SNF    Pt safe to transfer OOB/BTB and amb short distances with RN/PCT: Use RW with CGA of 1 person.    Goals remain appropriate.     Lexi Abel, PTA.   [phone]   2019

## 2019-04-02 NOTE — PROGRESS NOTES
Chronic HD treatment started. Arterial line aspirated and flushed well. Venous line doesn't aspirate, but flushed well. Access in left femoral. Lines secured and telemetry in place. No complaints of discomfort at this time.

## 2019-04-02 NOTE — PLAN OF CARE
REFERRALS SENT TO      [facility] E-Referral     [facility] E-Referral     [facility] E-Referral

## 2019-04-02 NOTE — PROGRESS NOTES
[facility] H[facility] NOTE    Jenni Todd is a 48 y.o. female currently on hemodialysis for removal of uremic toxins and volume.    Labs have been reviewed and the dialysate bath has been adjusted.    There are no symptoms of hypotension, chest pain, dyspnea, nausea or vomiting.    Dialysis treatment proceeding well without any acute events. She is hemodynamically stable.    Exam  Gen NAD  Alert and oriented x 3  Dialysis flow sheet and vitals noted  Respiration regular, unlabored    Labs:      Recent Labs   Lab 03/31/19  0730 04/01/19  0736 04/02/19  0607    136 137   K 4.3 4.7 5.0   CL 99 98 98   CO2 26 25 24   BUN 29* 36* 45*   CREATININE 8.0* 9.1* 10.0*   CALCIUM 9.0 9.3 9.3   PHOS 5.4* 5.5* 6.0*       Recent Labs   Lab 03/31/19  0730 04/01/19  0736 04/02/19  0607   WBC 7.65 8.60 9.60   HGB 8.7* 8.8* 9.1*   HCT 29.3* 28.7* 29.8*    361* 393*       Assessment/Plan:    ESRD  Hyperphosphatemia  Anemia of chronic illness    HD today for removal of uremic toxins and volume. Dialysate bath has been adjusted per labs.  Will stop sodium bicarbonate supplement as metabolic acidosis has resolved with HD  Continue phos binder with meals, strict low K, low phos diet  Monitor corrected Ca, avoid Ca supplements  Epogen with next HD  Small dose of heparin given as per HD protocol to prevent CVC clotting, as when she clots there is blood loss and loss of time on the dialysis machine

## 2019-04-02 NOTE — PROGRESS NOTES
[facility] Hospital Medicine                                                                     Progress Note     Team: [facility] A Guevara Metzger MD   Admit Date: 3/18/2019   Hospital Day: 15  JESSY: 4/3/2019   Code status: Full Code   Principal Problem: ESRD (end stage renal disease)     SUMMARY:     Jenni Todd is a 48 y.o. female w/ CAD s/p 2 PCI (2012), HFrEF 30%, HTN, DM, and ESRD on home PD admitted to [facility] on 2/21 for peritonitis. PD catheter removed. Due to hx of recurrent peritonitis she was transitioned to HD. Bilateral IJ tunneled catheters were attempted without success. Now has a temporary dialysis catheter in her left fem. [place] [facility] [place] Nephrology access team attempted a tunneled line and were unsuccessful. Vascular surgery consulted for HD access. Hospital course complicated by GIB with low risk ulcer. S/p OR 3/22 per vascular for RUE graft (Arsen). 3/28 groin perm-cath placed. NSTEMI 3/29.    SUBJECTIVE:     Pt was seen and examined at bedside. No complaints, or issues overnight. Remains stable. Medically ready for discharge, pending placement.     ROS (Positive in Bold, otherwise negative)  Pain Scale: 0 /10   Constitutional: fever, chills, night sweats  CV: chest pain, edema, palpitations  Resp: SOB, cough, sputum production  GI: changes in appetite, NVDC, pain, melena, hematochezia, GERD, hematemesis  : Dysuria, hematuria, urinary urgency, frequency  MSK: arthralgia/myalgia, joint swelling  SKIN: rashes, pruritis, petechiae   Neuro/Psych: FND, anxiety, depression    OBJECTIVE:     Vitals:  Temp:  [98 °F (36.7 °C)-98.7 °F (37.1 °C)]   Pulse:  []   Resp:  [18-20]   BP: ()/(61-95)   SpO2:  [98 %-100 %]      I & O (Last 24H):     Intake/Output Summary (Last 24 hours) at 4/2/2019 1720  Last data  filed at 4/2/2019 1400  Gross per 24 hour   Intake 400 ml   Output --   Net 400 ml       GEN: in no acute distress. Nontoxic. Resting in bed. Cooperative.  HEENT: NCAT. PERRL. EOMI. Conjunctivae/corneas clear, sclera Anicteric.  CVS: RRR. Normal s1 s2 no murmur, click, rub or gallop  LUNG: CTAB. Normal respiratory effort. No wheezes, rhonchi, or crackles.  ABD: PD site noted. Normoactive BS, soft, NT, ND, no masses or organomegaly.  EXT: No edema. No cyanosis. Full ROM. Right arm access dry  SKIN: color, texture, turgor normal. No rashes or lesions  NEURO: Alert, oriented x 4, Spont mvt of all extremities with no focal deficits noted.      All recent labs and imaging has been reviewed.     Recent Results (from the past 24 hour(s))   POCT glucose    Collection Time: 04/01/19  9:34 PM   Result Value Ref Range    POCT Glucose 189 (H) 70 - 110 mg/dL   CBC with Automated Differential    Collection Time: 04/02/19  6:07 AM   Result Value Ref Range    WBC 9.60 3.90 - 12.70 K/uL    RBC 3.00 (L) 4.00 - 5.40 M/uL    Hemoglobin 9.1 (L) 12.0 - 16.0 g/dL    Hematocrit 29.8 (L) 37.0 - 48.5 %    MCV 99 (H) 82 - 98 fL    MCH 30.3 27.0 - 31.0 pg    MCHC 30.5 (L) 32.0 - 36.0 g/dL    RDW 17.8 (H) 11.5 - 14.5 %    Platelets 393 (H) 150 - 350 K/uL    MPV 10.0 9.2 - 12.9 fL    Immature Granulocytes 0.5 0.0 - 0.5 %    Gran # (ANC) 4.9 1.8 - 7.7 K/uL    Immature Grans (Abs) 0.05 (H) 0.00 - 0.04 K/uL    Lymph # 3.8 1.0 - 4.8 K/uL    Mono # 0.6 0.3 - 1.0 K/uL    Eos # 0.2 0.0 - 0.5 K/uL    Baso # 0.05 0.00 - 0.20 K/uL    nRBC 0 0 /100 WBC    Gran% 50.8 38.0 - 73.0 %    Lymph% 39.7 18.0 - 48.0 %    Mono% 6.6 4.0 - 15.0 %    Eosinophil% 1.9 0.0 - 8.0 %    Basophil% 0.5 0.0 - 1.9 %    Differential Method Automated    Renal function panel    Collection Time: 04/02/19  6:07 AM   Result Value Ref Range    Glucose 113 (H) 70 - 110 mg/dL    Sodium 137 136 - 145 mmol/L    Potassium 5.0 3.5 - 5.1 mmol/L    Chloride 98 95 - 110 mmol/L    CO2 24 23 - 29  mmol/L    BUN, Bld 45 (H) 6 - 20 mg/dL    Calcium 9.3 8.7 - 10.5 mg/dL    Creatinine 10.0 (H) 0.5 - 1.4 mg/dL    Albumin 1.8 (L) 3.5 - 5.2 g/dL    Phosphorus 6.0 (H) 2.7 - 4.5 mg/dL    eGFR if  4.7 (A) >60 mL/min/1.73 m^2    eGFR if non  4.1 (A) >60 mL/min/1.73 m^2    Anion Gap 15 8 - 16 mmol/L   POCT glucose    Collection Time: 04/02/19  7:59 AM   Result Value Ref Range    POCT Glucose 122 (H) 70 - 110 mg/dL       Recent Labs   Lab 03/31/19  2016 04/01/19  0804 04/01/19  1234 04/01/19  1651 04/01/19  2134 04/02/19  0759   POCTGLUCOSE 186* 114* 170* 121* 189* 122*       Hemoglobin A1C   Date Value Ref Range Status   09/12/2018 6.3 (H) 4.0 - 5.6 % Final     Comment:     ADA Screening Guidelines:  5.7-6.4%  Consistent with prediabetes  >or=6.5%  Consistent with diabetes  High levels of fetal hemoglobin interfere with the HbA1C  assay. Heterozygous hemoglobin variants (HbS, HgC, etc)do  not significantly interfere with this assay.   However, presence of multiple variants may affect accuracy.     11/30/2017 7.9 (H) 4.0 - 5.6 % Final     Comment:     According to ADA guidelines, hemoglobin A1c <7.0% represents  optimal control in non-pregnant diabetic patients. Different  metrics may apply to specific patient populations.   Standards of Medical Care in Diabetes-2016.  For the purpose of screening for the presence of diabetes:  <5.7%     Consistent with the absence of diabetes  5.7-6.4%  Consistent with increasing risk for diabetes   (prediabetes)  >or=6.5%  Consistent with diabetes  Currently, no consensus exists for use of hemoglobin A1c  for diagnosis of diabetes for children.  This Hemoglobin A1c assay has significant interference with fetal   hemoglobin   (HbF). The results are invalid for patients with abnormal amounts of   HbF,   including those with known Hereditary Persistence   of Fetal Hemoglobin. Heterozygous hemoglobin variants (HbAS, HbAC,   HbAD, HbAE, HbA2) do not  significantly interfere with this assay;   however, presence of multiple variants in a sample may impact the %   interference.     02/22/2017 9.0 (H) 4.5 - 6.2 % Final     Comment:     According to ADA guidelines, hemoglobin A1C <7.0% represents  optimal control in non-pregnant diabetic patients.  Different  metrics may apply to specific populations.   Standards of Medical Care in Diabetes - 2016.  For the purpose of screening for the presence of diabetes:  <5.7%     Consistent with the absence of diabetes  5.7-6.4%  Consistent with increasing risk for diabetes   (prediabetes)  >or=6.5%  Consistent with diabetes  Currently no consensus exists for use of hemoglobin A1C  for diagnosis of diabetes for children.          Active Hospital Problems    Diagnosis  POA    *ESRD (end stage renal disease) on PD ( initiated dialysis 04/20/2004) [N18.6]  Yes     Nightly PD      NSTEMI (non-ST elevated myocardial infarction) [I21.4]  No    Other specified anemias [D64.89]  Yes    Pressure injury of left buttock, stage 2 [L89.322]  Yes    Delayed surgical wound healing [T81.89XA]  Yes    Chronic systolic heart failure [I50.22]  Yes    Acute gastric ulcer with hemorrhage [K25.0]  Yes    Antibiotic-associated diarrhea [K52.1, T36.95XA]  Yes    Hemodialysis catheter dysfunction [T82.41XA]  Yes    Peritonitis associated with peritoneal dialysis [T85.71XA]  Yes    Renovascular hypertension [I15.0]  Yes    Type 2 diabetes 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, with long-term current use of insulin [E11.22, N18.6, Z99.2, Z79.4]  Not Applicable    CAD (coronary artery disease) [I25.10]  Yes     Cath 12/27/2012:   RCA PCI 2.5 x 18 and 3.0 x 26 mm BMS   Patent LAD and LCX   Normal EF     Dr. Berrios for NSTEMI       DAPT score 3           Resolved Hospital Problems   No resolved problems to display.          ASSESSMENT AND PLAN:     ESRD on HD  Status post removal of PD catheter for concern regarding  "mutliple organsims growing in peritoneal fluid. Attempts at tunneled catheter in IJ x 2 during this admission. Left femoral trialysis placed. S/p OR 3/22 per vascular for RUE graft (Arsen). 3/28 groin perm-cath placed.   - Pt refuses renal diet; prefers diabetic  - Nephrology following, ESRD mgmt as per nephro  - No IVs in R arm  - RUQ graft is clotted per US; Surgery wish to fix it as an outpt.  Surgery does not think pt has good anatomy for vascular acces and wish for her to see Dr. Alonso for new PD placement in clinic.     NSTEMI (non-ST elevated myocardial infarction)  3/29 Tn 0.2>1.0  after a tachy followed by hypotensive episode the day after her groin perm-cath was placed. cards consulted 3/29 "recommend continuing medical therapy with Plavix 75 mg QD and Atorvastatin 40 mg QD; would avoid ASA given recent GIB .  -unable to initiate GDMT for HFrEF given soft BP, will attempt to resume home beta-blocker, ACE-I/ARB and potassium sparing agent when able and ischemic evaluation at future time point"  - blood culture collected in case of infection from recent procedure; NGTD   - per cards "Please ensure she follows up with her Cardiologist, Dr Calos Gavin on discharge to set up a stress test."    Peritonitis due to peritoneal dialysis catheter   - Finished course of cefepime as per ID  - Wound care consulted for PD site wound care     CAD  - remote stents.   - Was on DAPT but held due to GIB 3/13 and need for procedures here. Only plavix restarted given recent GIB per cards recs 3/29.    - holding BB due to recent low BP     Acute gastric ulcer with hemorrhage  GIB  - no more bleeding, on PPI  - biopsy neg for malignancy or H.pylori     Pressure injury stage 2 on left buttock   - consulted wound care     Chronic systolic heart failure  - EF 30% on TTE, stable  - Hold antihypertensive due to low BP, resume as able, can be done OP     Diabetes mellitus, type 2  - A1c 6.3, was getting intra-peritoneal " insulin. Catheter now removed.   - SSI for now      Debility  Generalized weakness  - PT OT to reassess  - PT rec SNF, referrals sent      Hypotension - resolved  Antibiotic-associated diarrhea - resolved, now soft stool - resolved  - Multiple episodes of hypotension suspecting from recurrent diarrhea, dialysis and maybe PVD  - Fluids prn and loperamide, continue to monitor   - Not on any antihypertensives at this time and HH stable  - Has been C diff negative.    - Barrier creams and antidiarrheals ordered           Prophylaxis- Hep TID     Code Status- Full Code      Discharge plan and follow up - pending SNF placement  -  Cardiologist - Dr Calos Gavin on discharge to set up a stress test.  -  Dr. Alonso for new PD placement in clinic.        Guevara Metzger MD  Hospital Medicine Staff  Pager [phone]

## 2019-04-02 NOTE — PT/OT/SLP PROGRESS
"Physical Therapy Treatment    Patient Name:  Jenni Todd   MRN:  [id number]  Admitting Diagnosis: ESRD (end stage renal disease)  Recent Surgery: Procedure(s) (LRB):  Insertion, Catheter, Central Venous, Hemodialysis (N/A) 5 Days Post-Op    Recommendations:     Discharge Recommendations:  nursing facility, skilled(Short stay)   Discharge Equipment Recommendations: commode   Barriers to discharge: Inaccessible home  Pt requiring increased assistance at current time.     Plan:     During this hospitalization, patient to be seen 3 x/week to address the above listed problems via gait training, therapeutic activities, therapeutic exercises, neuromuscular re-education  · Plan of Care Expires:  04/16/19   Plan of Care Reviewed with: patient    This Plan of care has been discussed with the patient who was involved in its development and understands and is in agreement with the identified goals and treatment plan    Subjective     Communicated with RN (William) prior to session.     Patient comments: "I think I have this HA because I haven't eaten yet"  Pain/Comfort:  · Pain Rating 1: 5/10  · Location - Side 1: Bilateral  · Location - Orientation 1: anterior  · Location 1: head  · Pain Addressed 1: Distraction, Cessation of Activity, Nurse notified  · Pain Rating Post-Intervention 1: 5/10    Objective:     Patient found with: telemetry    Patient found seated at the EOB upon PT entry to room, agreeable to treatment.  No family present in the room.    General Precautions: Standard, fall   Orthopedic Precautions:N/A   Braces: N/A       BED MOBILITY         NP 2* pt found/left seated at the EOB     TRANSFERS  (vc's for hand placement, sequencing of task and safety)   Patient completed Sit <> Stand Transfer from EOB with CGA for balance and safety with rolling walker x3 trial(s)   Patient completed Stand <> Sit Transfer to EOB with CGA for balance and safety with rolling walker      GAIT: within room per pt's " request   Patient ambulated: 34ft x2 trials (seated rest break in between trials)   Patient required: CGA for balance and safety   Patient used:  Rolling Walker   Gait Pattern observed: reciprocal gait   Gait Deviation(s): decreased pauly, increased time in double stance, decreased velocity of limb motion, decreased step length, decreased stride length and flat foot contact, slight FFP, increased reliance through UE's on AD    Comments: vc's for upright posture, pauly and safety    EDUCATION  Patient provided with daily orientation and goals of this PT session. They were educated to call for assistance and to transfer with hospital staff only.  Also, pt was educated on the effects of prolonged immobility and the importance of performing OOB activity and exercises to promote healing and reduce recovery time    Whiteboard updated with correct mobility information. RN/PCT notified.  Pt safe to transfer OOB/BTB and amb short distances with RN/PCT: Use RW with CGA of 1 person.    Patient left seated at the EOB with RN present, with  all lines intact and call button in reach    AM-PAC 6 CLICK MOBILITY  Turning over in bed (including adjusting bedclothes, sheets and blankets)?: 3  Sitting down on and standing up from a chair with arms (e.g., wheelchair, bedside commode, etc.): 3  Moving from lying on back to sitting on the side of the bed?: 2  Moving to and from a bed to a chair (including a wheelchair)?: 3  Need to walk in hospital room?: 3  Climbing 3-5 steps with a railing?: 2  Basic Mobility Total Score: 16     Assessment:     Jenni Todd is a 48 y.o. female admitted with a medical diagnosis of ESRD (end stage renal disease).  She presents with the following impairments/functional limitations:  weakness, impaired endurance, impaired functional mobilty, gait instability, impaired balance, decreased upper extremity function, decreased lower extremity function, pain.  requiring assistance and verbal cues for  bed mob, transfers and gait to prevent falls due to weakness, fatigue and pain.   In light of pt's current functional level and deficits, it is anticipated that pt will need to participate in an intense rehab program consisting of PT and OT in order to achieve full rehab potential to return to previous level of function and roles.  Pt remains motivated to participate in PT session and will cont to benefit from skilled PT intervention.    Rehab Prognosis:  Good; patient would benefit from acute skilled PT services to address these deficits and reach maximum level of function.      GOALS:   Multidisciplinary Problems     Physical Therapy Goals        Problem: Physical Therapy Goal    Goal Priority Disciplines Outcome Goal Variances Interventions   Physical Therapy Goal     PT, PT/OT Ongoing (interventions implemented as appropriate)     Description:  Goals to be met by: 2019     Patient will increase functional independence with mobility by performin. Supine to sit with Modified [place]  2. Sit to supine with Modified [place]  3. Sit to stand transfer with Modified [place]  4. Gait  x 150 feet with Supervision using Rolling Walker.   5. Ascend/Descend 4 inch curb step with Supervision using Rolling Walker.                       Time Tracking:     PT Received On: 19  PT Start Time: 1418     PT Stop Time: 1441  PT Total Time (min): 23 min     Billable Minutes: Gait Training 15 and Therapeutic Activity 8    Treatment Type: Treatment  PT/PTA: PTA     PTA Visit Number: 1       Lexi Abel PTA.  Pager [phone]    2019    .

## 2019-04-02 NOTE — PLAN OF CARE
Problem: Adult Inpatient Plan of Care  Goal: Plan of Care Review    Recommendations     Recommendation/Intervention:   1. Recommend adding low sodium restriction. Continue current 2000 diabetic diet.   2. Encourage intake of high protein foods for wound healing/HD needs. Consider ordering Beneprotein with meals.   3. RD following.     Goals: meet >85% EEN/EPN  Nutrition Goal Status: goal met

## 2019-04-02 NOTE — PLAN OF CARE
Problem: Adult Inpatient Plan of Care  Goal: Plan of Care Review  Outcome: Ongoing (interventions implemented as appropriate)  Patient AAOx4. Patient VSS. Patient denies pain. Patient free from falls or injury during shift. Patient repositioned independently. Wound care provided this shift. Patient in bed, bed in lowest position, call light in reach, bed alarm set, and personal items at bedside. Will continue to monitor.

## 2019-04-03 NOTE — PT/OT/SLP PROGRESS
"Physical Therapy Treatment    Patient Name:  Jenni Todd   MRN:  [id number]  Admitting Diagnosis: ESRD (end stage renal disease)  Recent Surgery: Procedure(s) (LRB):  Insertion, Catheter, Central Venous, Hemodialysis (N/A) 6 Days Post-Op    Recommendations:     Discharge Recommendations:  nursing facility, skilled(Short stay)   Discharge Equipment Recommendations: commode   Barriers to discharge: Inaccessible home    Plan:     During this hospitalization, patient to be seen 3 x/week to address the above listed problems via gait training, therapeutic activities, therapeutic exercises, neuromuscular re-education  · Plan of Care Expires:  04/16/19   Plan of Care Reviewed with: patient    This Plan of care has been discussed with the patient who was involved in its development and understands and is in agreement with the identified goals and treatment plan    Subjective     Communicated with RN (William) prior to session.     Patient comments: "I just realized they haven't been giving me the gabapentin and that's why my and legs and hands are bothering me"  RN was notified  Pain/Comfort:  · Pain Rating 1: (No rating provided)  · Location - Side 1: Bilateral  · Location - Orientation 1: generalized  · Location 1: (legs and bottom)  · Pain Addressed 1: Pre-medicate for activity, Reposition, Distraction  · Pain Rating Post-Intervention 1: (no rating provided)    Objective:       2 attempts for tx session 2* wound care RN's in room at 3:43 pm    Patient found with: (waffle overlay, L femoral HD cath)    Patient found in L side lying in bed upon PT entry to room, agreeable to treatment.  No family present in the room.    General Precautions: Standard, fall   Orthopedic Precautions:N/A   Braces: N/A       BED MOBILITY (vc's for hand placement sequencing of task):        Rolling to the R:  NT.       Rolling to the L:  NT.        Sup > sit at the EOB:  SBA for safety.       Sit > sup:  SBA for safety.                 "    SITTING AT THE EDGE OF THE BED    Assistance Level Required: sup for safety with B UE support   Postural deviations noted: flexed trunk, PPT   Encouraged: upright posture        TRANSFERS  (vc's for hand placement, sequencing of task and safety)   Patient completed Sit <> Stand Transfer from EOB with SBA for safety with rolling walker x2 trial(s)   Patient completed Stand <> Sit Transfer to EOB with SBA for safety with rolling walker      GAIT: within room   Patient ambulated: 48ft x2 trials with seated rest break in between trials   Patient required: CGA < > SBA for balance and safety   Patient used:  Rolling Walker   Gait Pattern observed: reciprocal gait   Gait Deviation(s): decreased pauly, increased time in double stance, decreased velocity of limb motion, decreased step length, decreased stride length, decreased toe-to-floor clearance, decreased weight-shifting ability and instability when turning, increased reliance on AD through UE's    Comments: vc's for upright posture and safety    EDUCATION  Patient provided with daily orientation and goals of this PT session. They were educated to call for assistance and to transfer with hospital staff only.  Also, pt was educated on the effects of prolonged immobility and the importance of performing OOB activity and exercises to promote healing and reduce recovery time    Whiteboard updated with correct mobility information. RN/PCT notified.  Pt safe to transfer OOB/BTB and amb short distances with RN/PCT: Use RW with CGA of 1 person.    Patient left right sidelying, with  all lines intact, call button in reach and RN notified    AM-PAC 6 CLICK MOBILITY  Turning over in bed (including adjusting bedclothes, sheets and blankets)?: 3  Sitting down on and standing up from a chair with arms (e.g., wheelchair, bedside commode, etc.): 3  Moving from lying on back to sitting on the side of the bed?: 3  Moving to and from a bed to a chair (including a wheelchair)?:  3  Need to walk in hospital room?: 3  Climbing 3-5 steps with a railing?: 2  Basic Mobility Total Score: 17     Assessment:     Jenni Todd is a 48 y.o. female admitted with a medical diagnosis of ESRD (end stage renal disease).  She presents with the following impairments/functional limitations:  weakness, impaired endurance, impaired self care skills, impaired functional mobilty, gait instability, decreased upper extremity function, decreased lower extremity function, pain, decreased ROM, impaired coordination, impaired fine motor. requiring assistance, increased time and verbal cues for bed mob, transfers and gait 2* weakness and pain.   In light of pt's current functional level and deficits, it is anticipated that pt will need to participate in an intense rehab program consisting of PT and OT in order to achieve full rehab potential to return to previous level of function and roles.  Pt remains motivated to participate in PT session and will cont to benefit from skilled PT intervention.    Rehab Prognosis:  Good; patient would benefit from acute skilled PT services to address these deficits and reach maximum level of function.      GOALS:   Multidisciplinary Problems     Physical Therapy Goals        Problem: Physical Therapy Goal    Goal Priority Disciplines Outcome Goal Variances Interventions   Physical Therapy Goal     PT, PT/OT Ongoing (interventions implemented as appropriate)     Description:  Goals to be met by: 2019     Patient will increase functional independence with mobility by performin. Supine to sit with Modified [place]  2. Sit to supine with Modified [place]  3. Sit to stand transfer with Modified [place]  4. Gait  x 150 feet with Supervision using Rolling Walker.   5. Ascend/Descend 4 inch curb step with Supervision using Rolling Walker.                       Time Tracking:     PT Received On: 19  PT Start Time: 1605     PT Stop Time: 1631  PT Total Time  (min): 26 min     Billable Minutes: Gait Training 16 and Therapeutic Activity 10    Treatment Type: Treatment  PT/PTA: PTA     PTA Visit Number: 2       Lexi Abel PTA.  Pager [phone]    4/3/2019    .

## 2019-04-03 NOTE — PLAN OF CARE
04/03/19 1117   Discharge Reassessment   Assessment Type Discharge Planning Reassessment   Provided patient/caregiver education on the expected discharge date and the discharge plan Yes   Do you have any problems affording any of your prescribed medications? No   Discharge Plan A Skilled Nursing Facility  (With HD)   Discharge Plan B Skilled Nursing Facility   DME Needed Upon Discharge  none   Patient choice form signed by patient/caregiver N/A   Anticipated Discharge Disposition SNF   Can the patient answer the patient profile reliably? Yes, cognitively intact   How does the patient rate their overall health at the present time? Fair     CM to the Bedside at this time. She was notified that the CM team continues to follow for D/C needs and/or recommendations. Current plan is for the patient to go to [facility] with OP HD at [facility] at [facility]. The patient is aware that the plan is to transfer to SNF either later today or tomorrow. Patient is in agreement with the D/C plan. CM name and number remain on the board at the Bedside for the patient or family to call with D/C needs or questions. Understanding verbalized.

## 2019-04-03 NOTE — PROGRESS NOTES
[facility] Hospital Medicine                                                                     Progress Note     Team: [facility] HOSP MED A Guevara Metzger MD   Admit Date: 3/18/2019   Hospital Day: 16  JESSY: 4/4/2019   Code status: Full Code   Principal Problem: ESRD (end stage renal disease)     SUMMARY:     Jenni Todd is a 48 y.o. female w/ CAD s/p 2 PCI (2012), HFrEF 30%, HTN, DM, and ESRD on home PD admitted to [facility] on 2/21 for peritonitis. PD catheter removed. Due to hx of recurrent peritonitis she was transitioned to HD. Bilateral IJ tunneled catheters were attempted without success. Now has a temporary dialysis catheter in her left fem. Transferred to [facility] for vascular. Nephrology access team attempted a tunneled line and were unsuccessful. Vascular surgery consulted for HD access. Hospital course complicated by GIB with low risk ulcer. S/p OR 3/22 per vascular for RUE graft (Arsen). 3/28 groin perm-cath placed. NSTEMI 3/29.    SUBJECTIVE:     Pt was seen and examined at bedside. No complaints, or issues overnight. Remains stable. Medically ready for discharge, pending placement.     ROS (Positive in Bold, otherwise negative)  Pain Scale: 0 /10   Constitutional: fever, chills, night sweats  CV: chest pain, edema, palpitations  Resp: SOB, cough, sputum production  GI: changes in appetite, NVDC, pain, melena, hematochezia, GERD, hematemesis  : Dysuria, hematuria, urinary urgency, frequency  MSK: arthralgia/myalgia, joint swelling  SKIN: rashes, pruritis, petechiae   Neuro/Psych: FND, anxiety, depression    OBJECTIVE:     Vitals:  Temp:  [98.3 °F (36.8 °C)-99.2 °F (37.3 °C)]   Pulse:  []   Resp:  [15-20]   BP: ()/(61-82)   SpO2:  [98 %-100 %]      I & O (Last 24H):     Intake/Output Summary (Last 24 hours) at 4/3/2019 1505  Last  data filed at 4/3/2019 1400  Gross per 24 hour   Intake 700 ml   Output --   Net 700 ml       GEN: in no acute distress. Nontoxic. Resting in bed. Cooperative.  HEENT: NCAT. PERRL. EOMI. Conjunctivae/corneas clear, sclera Anicteric.  CVS: RRR. Normal s1 s2 no murmur, click, rub or gallop  LUNG: CTAB. Normal respiratory effort. No wheezes, rhonchi, or crackles.  ABD: PD site noted. Normoactive BS, soft, NT, ND, no masses or organomegaly.  EXT: No edema. No cyanosis. Full ROM. Right arm access dry  SKIN: color, texture, turgor normal. No rashes or lesions  NEURO: Alert, oriented x 4, Spont mvt of all extremities with no focal deficits noted.      All recent labs and imaging has been reviewed.     Recent Results (from the past 24 hour(s))   POCT glucose    Collection Time: 04/02/19  5:21 PM   Result Value Ref Range    POCT Glucose 210 (H) 70 - 110 mg/dL   POCT glucose    Collection Time: 04/02/19  9:27 PM   Result Value Ref Range    POCT Glucose 155 (H) 70 - 110 mg/dL   CBC with Automated Differential    Collection Time: 04/03/19  4:57 AM   Result Value Ref Range    WBC 8.05 3.90 - 12.70 K/uL    RBC 2.74 (L) 4.00 - 5.40 M/uL    Hemoglobin 8.3 (L) 12.0 - 16.0 g/dL    Hematocrit 27.2 (L) 37.0 - 48.5 %    MCV 99 (H) 82 - 98 fL    MCH 30.3 27.0 - 31.0 pg    MCHC 30.5 (L) 32.0 - 36.0 g/dL    RDW 17.7 (H) 11.5 - 14.5 %    Platelets 352 (H) 150 - 350 K/uL    MPV 10.6 9.2 - 12.9 fL    Immature Granulocytes 0.4 0.0 - 0.5 %    Gran # (ANC) 4.2 1.8 - 7.7 K/uL    Immature Grans (Abs) 0.03 0.00 - 0.04 K/uL    Lymph # 2.8 1.0 - 4.8 K/uL    Mono # 0.7 0.3 - 1.0 K/uL    Eos # 0.2 0.0 - 0.5 K/uL    Baso # 0.06 0.00 - 0.20 K/uL    nRBC 0 0 /100 WBC    Gran% 52.7 38.0 - 73.0 %    Lymph% 34.9 18.0 - 48.0 %    Mono% 8.9 4.0 - 15.0 %    Eosinophil% 2.4 0.0 - 8.0 %    Basophil% 0.7 0.0 - 1.9 %    Differential Method Automated    Renal function panel    Collection Time: 04/03/19  4:57 AM   Result Value Ref Range    Glucose 117 (H) 70 - 110  mg/dL    Sodium 139 136 - 145 mmol/L    Potassium 4.6 3.5 - 5.1 mmol/L    Chloride 102 95 - 110 mmol/L    CO2 22 (L) 23 - 29 mmol/L    BUN, Bld 23 (H) 6 - 20 mg/dL    Calcium 9.1 8.7 - 10.5 mg/dL    Creatinine 6.6 (H) 0.5 - 1.4 mg/dL    Albumin 1.7 (L) 3.5 - 5.2 g/dL    Phosphorus 5.1 (H) 2.7 - 4.5 mg/dL    eGFR if African American 7.8 (A) >60 mL/min/1.73 m^2    eGFR if non  6.8 (A) >60 mL/min/1.73 m^2    Anion Gap 15 8 - 16 mmol/L   POCT glucose    Collection Time: 04/03/19  8:00 AM   Result Value Ref Range    POCT Glucose 135 (H) 70 - 110 mg/dL       Recent Labs   Lab 04/01/19  2134 04/02/19  0759 04/02/19  1243 04/02/19  1721 04/02/19  2127 04/03/19  0800   POCTGLUCOSE 189* 122* 121* 210* 155* 135*       Hemoglobin A1C   Date Value Ref Range Status   09/12/2018 6.3 (H) 4.0 - 5.6 % Final     Comment:     ADA Screening Guidelines:  5.7-6.4%  Consistent with prediabetes  >or=6.5%  Consistent with diabetes  High levels of fetal hemoglobin interfere with the HbA1C  assay. Heterozygous hemoglobin variants (HbS, HgC, etc)do  not significantly interfere with this assay.   However, presence of multiple variants may affect accuracy.     11/30/2017 7.9 (H) 4.0 - 5.6 % Final     Comment:     According to ADA guidelines, hemoglobin A1c <7.0% represents  optimal control in non-pregnant diabetic patients. Different  metrics may apply to specific patient populations.   Standards of Medical Care in Diabetes-2016.  For the purpose of screening for the presence of diabetes:  <5.7%     Consistent with the absence of diabetes  5.7-6.4%  Consistent with increasing risk for diabetes   (prediabetes)  >or=6.5%  Consistent with diabetes  Currently, no consensus exists for use of hemoglobin A1c  for diagnosis of diabetes for children.  This Hemoglobin A1c assay has significant interference with fetal   hemoglobin   (HbF). The results are invalid for patients with abnormal amounts of   HbF,   including those with known  Hereditary Persistence   of Fetal Hemoglobin. Heterozygous hemoglobin variants (HbAS, HbAC,   HbAD, HbAE, HbA2) do not significantly interfere with this assay;   however, presence of multiple variants in a sample may impact the %   interference.     02/22/2017 9.0 (H) 4.5 - 6.2 % Final     Comment:     According to ADA guidelines, hemoglobin A1C <7.0% represents  optimal control in non-pregnant diabetic patients.  Different  metrics may apply to specific populations.   Standards of Medical Care in Diabetes - 2016.  For the purpose of screening for the presence of diabetes:  <5.7%     Consistent with the absence of diabetes  5.7-6.4%  Consistent with increasing risk for diabetes   (prediabetes)  >or=6.5%  Consistent with diabetes  Currently no consensus exists for use of hemoglobin A1C  for diagnosis of diabetes for children.          Active Hospital Problems    Diagnosis  POA    *ESRD (end stage renal disease) on PD ( initiated dialysis 04/20/2004) [N18.6]  Yes     Nightly PD      NSTEMI (non-ST elevated myocardial infarction) [I21.4]  No    Other specified anemias [D64.89]  Yes    Pressure injury of left buttock, stage 2 [L89.322]  Yes    Delayed surgical wound healing [T81.89XA]  Yes    Chronic systolic heart failure [I50.22]  Yes    Acute gastric ulcer with hemorrhage [K25.0]  Yes    Antibiotic-associated diarrhea [K52.1, T36.95XA]  Yes    Hemodialysis catheter dysfunction [T82.41XA]  Yes    Peritonitis associated with peritoneal dialysis [T85.71XA]  Yes    Renovascular hypertension [I15.0]  Yes    Type 2 diabetes 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, with long-term current use of insulin [E11.22, N18.6, Z99.2, Z79.4]  Not Applicable    CAD (coronary artery disease) [I25.10]  Yes     Cath 12/27/2012:   RCA PCI 2.5 x 18 and 3.0 x 26 mm BMS   Patent LAD and LCX   Normal EF     Dr. Berrios for NSTEMI       DAPT score 3           Resolved Hospital Problems   No resolved problems to  "display.          ASSESSMENT AND PLAN:     ESRD on HD  Status post removal of PD catheter for concern regarding mutliple organsims growing in peritoneal fluid. Attempts at tunneled catheter in IJ x 2 during this admission. Left femoral trialysis placed. S/p OR 3/22 per vascular for RUE graft (Arsen). 3/28 groin perm-cath placed.   - Pt refuses renal diet; prefers diabetic  - Nephrology following, ESRD mgmt as per nephro  - No IVs in R arm  - RUQ graft is clotted per US; Surgery wish to fix it as an outpt.  Surgery does not think pt has good anatomy for vascular acces and wish for her to see Dr. Alonso for new PD placement in clinic.     NSTEMI (non-ST elevated myocardial infarction)  3/29 Tn 0.2>1.0  after a tachy followed by hypotensive episode the day after her groin perm-cath was placed. cards consulted 3/29 "recommend continuing medical therapy with Plavix 75 mg QD and Atorvastatin 40 mg QD; would avoid ASA given recent GIB .  -unable to initiate GDMT for HFrEF given soft BP, will attempt to resume home beta-blocker, ACE-I/ARB and potassium sparing agent when able and ischemic evaluation at future time point"  - blood culture collected in case of infection from recent procedure; NGTD   - per cards "Please ensure she follows up with her Cardiologist, Dr Calos Gavin on discharge to set up a stress test."    Peritonitis due to peritoneal dialysis catheter   - Finished course of cefepime as per ID  - Wound care consulted for PD site wound care     CAD  - remote stents.   - Was on DAPT but held due to GIB 3/13 and need for procedures here. Only plavix restarted given recent GIB per cards recs 3/29.    - holding BB due to recent low BP     Acute gastric ulcer with hemorrhage  GIB  - no more bleeding, on PPI  - biopsy neg for malignancy or H.pylori     Pressure injury stage 2 on left buttock   - consulted wound care     Chronic systolic heart failure  - EF 30% on TTE, stable  - Hold antihypertensive due to low " BP, resume as able, can be done OP     Diabetes mellitus, type 2  - A1c 6.3, was getting intra-peritoneal insulin. Catheter now removed.   - SSI for now      Debility  Generalized weakness  - PT OT to reassess  - PT rec SNF, referrals sent      Hypotension - resolved  Antibiotic-associated diarrhea - resolved, now soft stool - resolved  - Multiple episodes of hypotension suspecting from recurrent diarrhea, dialysis and maybe PVD  - Fluids prn and loperamide, continue to monitor   - Not on any antihypertensives at this time and HH stable  - Has been C diff negative.    - Barrier creams and antidiarrheals ordered           Prophylaxis- Hep TID     Code Status- Full Code      Discharge plan and follow up - pending SNF placement  -  Cardiologist - Dr Calos Gavin on discharge to set up a stress test.  -  Dr. Alonso for new PD placement in clinic.        Guevara Metzger MD  Hospital Medicine Staff  Pager [phone]

## 2019-04-03 NOTE — PHYSICIAN QUERY
PT Name: Jenni Todd  MR #: [id number]     Physician Query Form - Documentation Clarification      CDS/: Sahara Frederick RN              Contact information: [phone]    This form is a permanent document in the medical record.     Query Date: April 3, 2019    By submitting this query, we are merely seeking further clarification of documentation. Please utilize your independent clinical judgment when addressing the question(s) below.    The Medical record reflects the following:    Supporting Clinical Findings Location in Medical Record   Perm cath placed last pm.  ~5am at the start of HD pt had tachycardia of 150 followed by hypotension of 80/50.   Pt felt weak and cold.  HD stopped.  500cc NS given with improvement of patient's symptoms..         During her [place] [facility] patient was started on HD. Overnight,  on 3/28-3/29, after 1 hr of treatment the patient became tachycardic, hypotensive and complained of chills/shivering. Rapid response was called and HD was stopped with resolution of symptoms soon after. EKG at that time showed sinus tachycardia      Multiple episodes of hypotension suspecting from recurrent diarrhea, dialysis and maybe PVD         CCS notified of patient and concerns for hypotension in setting of CKD vs hypovolemia   3/29 Hosp med note              3/30 Cards consult                3/30 Hosp med note          3/20 Intensive care note     Had some TWI with hypotensive episode during dialysis, history of CAD s/p PSI       4/1 Cards MD note                                                                            Doctor, Please specify the etiology of hypotension:       Provider Use Only      [ X  ] Hypotension due to HD    [ X ] Hypotension due to hypovolemia    [   ] Hypotension due to PVD    [ X ] Hypotension due to:  (please specify) possible non infectious diarrhea     [   ] Other_______________________________________                                                                                                                    [  ] Clinically Undetermined

## 2019-04-03 NOTE — PT/OT/SLP PROGRESS
Occupational Therapy      Patient Name:  Jenni Todd   MRN:  [id number]    Patient seen not treated today secondary to Unavailable (Comment)(Pt working w/ PT.). Will follow-up as schedule allows.    Lexx Shore, OT  4/3/2019

## 2019-04-03 NOTE — PLAN OF CARE
04/03/19 1606   Post-Acute Status   Post-Acute Authorization Placement   Post-Acute Placement Status Pending Bed Availability     OSNF may be able to accept pt 4/4 with HD. HD chair set completed with [facility]. KEELY obtained chair letter and confirmed acceptance with clinic, Tiffanie. KEELY notified Tiffanie at 4pm that pt not d/c today and start date would need to be changed, pending d/c to OSNF. KEELY updated pt as well.    KEELY will f/u tomorrow on potential transfer to OSNF.    Emely Burton, [facility] [id number]

## 2019-04-03 NOTE — PLAN OF CARE
CM informed crista OSNF will be able to admit pt either today or tomorrow.     CRISTA confirmed with Tej from [facility] that HD chair set up is complete at [facility], although letter is not coming through email or fax. CRISTA spoke with Tiffanie at [facility] [phone]), confirmed pt has been medically cleared, chair time TTS 10:15am, start date Th 4/4, pt needs to be there for 9:45am. CRISTA will inform clinic if pt does not d/c today.    CRISTA will inform OSNF of HD info.    Emely Burton, LCSW [id number]

## 2019-04-03 NOTE — TELEPHONE ENCOUNTER
----- Message from Stephanie Mo MD sent at 4/2/2019  9:20 PM CDT -----  Pt was in hops recently. Had ECG changes w/o angina. She should have a clinic visit and stress test w Dr. Yousif. Pl check w him if he would like to do a PET stress test and if yes, does he want to do it before the appt.   HKD  SOLEDAD Swift, FACC, FABIANA   Preventive Cardiology  Tel: [phone]

## 2019-04-03 NOTE — PLAN OF CARE
Problem: Infection  Goal: Infection Symptom Resolution  Outcome: Ongoing (interventions implemented as appropriate)  dressinh to abdominal wound sites changed as ordered. Patient remains afebrile. Tolerated wound care well.

## 2019-04-03 NOTE — PROGRESS NOTES
Patient seen for wound care follow up with Petty Warren RN.   See flow sheet below for wound documentation and photodocumentation.    Recommend continuing current treatment to abdominal wounds, every MWF pack with aquacel ag packing strip and cover with foam dressing.   Additional depth of was noted to lower abdominal wound that prior weeks assessment, last weeks depth 4cm and this week's depth 4.5cm. Patient appeared to have more pain to lower abdominal wound that prior week.    Recommend continuing Triad barrier cream to stage 2 pressure injury to right buttocks BID/prn. Indurated area noted to left buttocks, appears to be a boil, approx 1cm x 0.5cm. Area does not appear to need an I&D at this time but if area continues to become larger, I&D may be necessary in future.    Patient tolerated care well. Nursing to continue care per orders.  Secure chat message sent to Dr. Metzger, updating on recommendations and assessment.  Wound care to follow prn.     04/03/19 1559        Incision/Site 03/14/19 1445 Left Abdomen   Date First Assessed/Time First Assessed: 03/14/19 1445   Side: Left  Location: Abdomen   Wound Image    Incision WDL ex   Drainage Amount Scant   Drainage Characteristics/Odor Serous;No odor   Appearance Moist;Pink;Adipose;Slough  (scant slough)   Periwound Area Intact   Wound Edges Open   Wound Length (cm) 0.7 cm   Wound Width (cm) 2 cm   Wound Depth (cm) 1.5 cm   Wound Volume (cm^3) 2.1 cm^3   Wound Surface Area (cm^2) 1.4 cm^2   Care Cleansed with:;Sterile normal saline   Dressing Changed  (aquacel ag packing strip, foam dressing)   Dressing Change Due 04/05/19        Incision/Site 03/19/19 Right Abdomen   Date First Assessed: 03/19/19   Side: Right  Location: Abdomen   Wound Image    Incision WDL ex   Drainage Amount Scant   Drainage Characteristics/Odor Serous;No odor   Appearance Moist;Pink;Adipose;Slough   Periwound Area Intact   Wound Edges Open   Wound Length (cm) 0.5 cm   Wound Width (cm) 1  cm   Wound Depth (cm) 1.5 cm   Wound Volume (cm^3) 0.75 cm^3   Wound Surface Area (cm^2) 0.5 cm^2   Care Cleansed with:;Sterile normal saline   Dressing Changed  (aquacel ag packing strip covered with foam)   Dressing Change Due 04/05/19        Incision/Site 03/19/19 Abdomen lower   Date First Assessed: 03/19/19   Location: Abdomen  Orientation: lower   Wound Image    Incision WDL ex   Drainage Amount Scant   Drainage Characteristics/Odor Serous;No odor   Appearance Moist;Pink;Adipose;Fibrin   Periwound Area Intact   Wound Edges Open   Wound Length (cm) 0.5 cm   Wound Width (cm) 2.5 cm   Wound Depth (cm) 4.5 cm   Wound Volume (cm^3) 5.62 cm^3   Wound Surface Area (cm^2) 1.25 cm^2   Care Cleansed with:;Sterile normal saline   Dressing Changed  (aquacel ag packing strip, foam dressing)   Dressing Change Due 04/05/19        Pressure Injury 03/27/19 Right Buttocks Stage 2   Date First Assessed: 03/27/19   Pressure Injury Present on Admission: suspected hospital acquired  Side: Right  Location: Buttocks  Staging: Stage 2   Wound Image    Staging Stage 2   Appearance Moist;Pink   Tissue loss description Partial thickness   Periwound Area   (induration noted to left buttocks, appears to be a boil)   Wound Edges Open   Wound Length (cm) 0.5 cm   Wound Width (cm) 0.5 cm   Wound Depth (cm) 0.1 cm   Wound Volume (cm^3) 0.02 cm^3   Wound Surface Area (cm^2) 0.25 cm^2   Care   (Triad barrier cream BID/prn)

## 2019-04-03 NOTE — PLAN OF CARE
Problem: Physical Therapy Goal  Goal: Physical Therapy Goal  Goals to be met by: 2019     Patient will increase functional independence with mobility by performin. Supine to sit with Modified [place]  2. Sit to supine with Modified [place]  3. Sit to stand transfer with Modified [place]  4. Gait  x 150 feet with Supervision using Rolling Walker.   5. Ascend/Descend 4 inch curb step with Supervision using Rolling Walker.        Discharge Recommendations: Short Stay SNF    Pt safe to transfer OOB/BTB and amb short distances with RN/PCT: Use RW with CGA of 1 person.    Goals remain appropriate.     Lexi Abel, PTA.   [phone]   4/3/2019

## 2019-04-04 NOTE — NURSING
Arrived on unit via transporter. Patient in a wheelchair. Patient is awake alert and oriented x 4. Resp pattern even and unlabored. No distress noted.

## 2019-04-04 NOTE — PT/OT/SLP PROGRESS
Occupational Therapy   Treatment    Name: Jenni Todd  MRN: [id number]  Admitting Diagnosis:  ESRD (end stage renal disease)  7 Days Post-Op    Recommendations:     Discharge Recommendations: nursing facility, skilled  Discharge Equipment Recommendations:  commode  Barriers to discharge:  Decreased caregiver support    Assessment:     Jenni Todd is a 48 y.o. female with a medical diagnosis of ESRD (end stage renal disease).  She presents with improved mobility and occupational performance. Performance deficits affecting function are weakness, impaired sensation, impaired self care skills, impaired functional mobilty, decreased upper extremity function, decreased lower extremity function, impaired balance.     Rehab Prognosis:  Fair; patient would benefit from acute skilled OT services to address these deficits and reach maximum level of function.       Plan:     Patient to be seen 3 x/week to address the above listed problems via self-care/home management, community/work re-entry  · Plan of Care Expires: 04/23/19  · Plan of Care Reviewed with: patient    Subjective     Pain/Comfort:  · Pain Rating 1: 0/10    Objective:     Communicated with: RN prior to session.  Patient found up in chair with telemetry upon OT entry to room.    General Precautions: Standard, fall   Orthopedic Precautions:N/A   Braces: N/A     Occupational Performance:     Bed Mobility:    · N/A     Functional Mobility/Transfers:  · Patient completed Sit <> Stand Transfer with modified independence  with  no assistive device   · Functional Mobility: N/A    Activities of Daily Living:  · Lower Body Dressing: minimum assistance seated in chair.      [facility] 6 Click ADL: 22    Treatment & Education:  -Pt edu on OT role/POC  -Importance of OOB activity with staff assistance  -Safety during functional t/f and mobility  - White board updated  - Multiple self care tasks/functional mobility completed-- assistance level noted above  - All  questions/concerns answered within OT scope of practice    Review the different of expectation between SNF and Acute care.  Pt acknowledged understanding and accepted the responsibility.     Patient left up in chair with all lines intact, call button in reach, RN notified and PCT presentEducation:      GOALS:   Multidisciplinary Problems     Occupational Therapy Goals        Problem: Occupational Therapy Goal    Goal Priority Disciplines Outcome Interventions   Occupational Therapy Goal     OT, PT/OT Ongoing (interventions implemented as appropriate)    Description:    Updated Goals to be met by: 4/16/19   UE Dressing with Supervision. Met 4/4 MIRZA  LE Dressing with Moderate Assistance. MET 4/1 MIRZA  Grooming while standing at sink with Stand-by Assistance. PROGRESSING  Toileting from toilet with Supervision for hygiene and clothing management. MET 4/1 W/ COMMODE OVER TOILET  Supine to sit with Minimal Assistance.  MET 4/1 MIRZA  Step transfer with Stand-by Assistance MET 4/4 MIRZA  Toilet transfer to toilet with Minimal Assistance. MET 4/1W/ COMMODE OVER TOILET        Goals to be met by: 3/31/19 0/7 GOALS MET    Patient will increase functional independence with ADLs by performing:    UE Dressing with Supervision.  LE Dressing with Moderate Assistance.  Grooming while standing at sink with Stand-by Assistance.  Toileting from toilet with Supervision for hygiene and clothing management.   Supine to sit with Minimal Assistance.  Step transfer with Stand-by Assistance  Toilet transfer to toilet with Minimal Assistance. Progressing                            Time Tracking:     OT Date of Treatment: 04/04/19  OT Start Time: 1500  OT Stop Time: 1509  OT Total Time (min): 9 min    Billable Minutes:Therapeutic Activity 9 mins    Lexx Shore, OT  4/4/2019

## 2019-04-04 NOTE — PLAN OF CARE
Problem: Adult Inpatient Plan of Care  Goal: Plan of Care Review  Outcome: Ongoing (interventions implemented as appropriate)  Hemodialysis tx complete, 2.2L removed in 3 hour tx, tolerated well. Blood returned via left femoral perm cath, both lumens hep locked, capped and taped

## 2019-04-04 NOTE — PLAN OF CARE
Patient to transfer to [facility]. Per Mera, report can be called to [phone]. Transport will be arranged at this time via the CM team.    1523 - Transport arranged via the [facility] for a wheelchair van to  the patient at 1630.

## 2019-04-04 NOTE — PLAN OF CARE
KEELY informed [facility], located within [facility] clinic manager, Tanvi [phone]), of pt d/c today. She said she does not like to have pts do first HD treatment on a Sat, as pt is on a TTS schedule. KEELY informed Mera at [facility] and dr. Alexander to discuss options with nephrology to attempt to move forward with d/c plan to [facility] today.    Emely Burton, [facility] [phone]

## 2019-04-04 NOTE — PROGRESS NOTES
[facility] NOTE    Jenni Todd is a 48 y.o. female currently on hemodialysis for removal of uremic toxins and volume.    Labs have been reviewed and the dialysate bath has been adjusted.    There are no symptoms of hypotension, chest pain, dyspnea, nausea or vomiting.    Exam  Alert and oriented x 3  Trace to 1 + edema of LE  Respiration regular    Labs:      Recent Labs   Lab 04/02/19  0607 04/03/19  0457 04/04/19  0630    139 135*   K 5.0 4.6 4.9   CL 98 102 101   CO2 24 22* 23   BUN 45* 23* 29*   CREATININE 10.0* 6.6* 8.7*   CALCIUM 9.3 9.1 9.3   PHOS 6.0* 5.1* 5.7*       Recent Labs   Lab 04/02/19  0607 04/03/19 0457 04/04/19  0630   WBC 9.60 8.05 9.47   HGB 9.1* 8.3* 8.2*   HCT 29.8* 27.2* 27.0*   * 352* 381*       Assessment/Plan:    ESRD  Hyperphosphatemia  Anemia of chronic illness      HD today for removal of uremic toxins and volume.  Pt was seen during hemodialysis today, dialysis flowsheet, labs, vitals were reviewed and d/w staff.    Epogen with HD today  Continue low phos, low K diet, give phos binder with meals  Pt counseled about need for fluid and salt restriction

## 2019-04-04 NOTE — NURSING
Pt discharge to skilled facility. All personal belonging gathered by patient. No apparent distress or discomfort present. Patient transported by [facility] via wc

## 2019-04-04 NOTE — PLAN OF CARE
CM to the Bedside at this time. The patient was notified that she would be transferred to [facility] later today once a patient is D/C'd and a room is available. She was notified that transfer would likely be sometime after 3pm and that the CM team would be arranging her transport to the SNF facility. The patient is aware that the SW has an HD chair arranged at the [facility] location. Per Mera Nicholson, at [facility], she states they will be able to provide transportation for the patient to her scheduled Dialysis appointments. The patient is in agreement with the D/C plan and was given ample time to process the information given. All questions were answered at the bedside at the time of visit. The patient has no further questions at this time. CM name and number remain on the board at the Bedside for her to call with any D/C needs or questions. Understanding verbalized.     7205 - Patient notified that she officially has a bed assignment at [facility] and that the CM will be providing the Bedside RN with the number to call report and arranging transport to get her to the new facility. Understanding verbalized.

## 2019-04-04 NOTE — PLAN OF CARE
Problem: Adult Inpatient Plan of Care  Goal: Plan of Care Review  Outcome: Ongoing (interventions implemented as appropriate)  Patient AAOx4. Vitals remain stable. No complaints of pain this shift. Will continue to monitor.     Problem: Diabetes Comorbidity  Goal: Blood Glucose Level Within Desired Range  Outcome: Ongoing (interventions implemented as appropriate)  Glucose checked HS per MD orders. No s/s of hypoglycemia noted.      Problem: Fall Injury Risk  Goal: Absence of Fall and Fall-Related Injury  Outcome: Ongoing (interventions implemented as appropriate)  Patient remains free from falls/injury throughout shift. Bed in lowest position. Call light within reach. Patient able to make needs known to staff.     Problem: Skin Injury Risk Increased  Goal: Skin Health and Integrity  Outcome: Ongoing (interventions implemented as appropriate)  Wound to right buttock cleansed and triad ointment applied prn this shift.

## 2019-04-04 NOTE — PLAN OF CARE
Problem: Occupational Therapy Goal  Goal: Occupational Therapy Goal    Updated Goals to be met by: 4/16/19   UE Dressing with Supervision. Met 4/4 MIRZA  LE Dressing with Moderate Assistance. MET 4/1 MIRZA  Grooming while standing at sink with Stand-by Assistance. PROGRESSING  Toileting from toilet with Supervision for hygiene and clothing management. MET 4/1 W/ COMMODE OVER TOILET  Supine to sit with Minimal Assistance.  MET 4/1 MIRZA  Step transfer with Stand-by Assistance MET 4/4 MIRZA  Toilet transfer to toilet with Minimal Assistance. MET 4/1W/ COMMODE OVER TOILET        Goals to be met by: 3/31/19 0/7 GOALS MET    Patient will increase functional independence with ADLs by performing:    UE Dressing with Supervision.  LE Dressing with Moderate Assistance.  Grooming while standing at sink with Stand-by Assistance.  Toileting from toilet with Supervision for hygiene and clothing management.   Supine to sit with Minimal Assistance.  Step transfer with Stand-by Assistance  Toilet transfer to toilet with Minimal Assistance. Progressing          Outcome: Ongoing (interventions implemented as appropriate)  Goals are still appropriate, continue w/ OT POC.

## 2019-04-04 NOTE — PLAN OF CARE
requested sw see if pts HD schedule could be changed to MWF. KEELY called [facility], spoke with Tanvi; they are unable to provide a MWF chair time as they are full. Tanvi said the clinic will allow pt to start on Sat. KEELY informed  and Mera at [facility] of this info in order to move forward with d/c.    Emely Burton, [facility] [phone]

## 2019-04-04 NOTE — PLAN OF CARE
[facility]     Department of Hospital Medicine     [address] [place] [address]     [phone] after hours  [phone] fax       NURSING HOME ORDERS    04/04/2019    Admit to Nursing Home:  Skilled Bed      Diagnoses:  Active Hospital Problems    Diagnosis  POA    *ESRD (end stage renal disease) on PD ( initiated dialysis 04/20/2004) [N18.6]  Yes     Nightly PD      NSTEMI (non-ST elevated myocardial infarction) [I21.4]  No    Other specified anemias [D64.89]  Yes    Pressure injury of left buttock, stage 2 [L89.322]  Yes    Delayed surgical wound healing [T81.89XA]  Yes    Chronic systolic heart failure [I50.22]  Yes    Acute gastric ulcer with hemorrhage [K25.0]  Yes    Antibiotic-associated diarrhea [K52.1, T36.95XA]  Yes    Hemodialysis catheter dysfunction [T82.41XA]  Yes    Peritonitis associated with peritoneal dialysis [T85.71XA]  Yes    Renovascular hypertension [I15.0]  Yes    Type 2 diabetes 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, with long-term current use of insulin [E11.22, N18.6, Z99.2, Z79.4]  Not Applicable    CAD (coronary artery disease) [I25.10]  Yes     Cath 12/27/2012:   RCA PCI 2.5 x 18 and 3.0 x 26 mm BMS   Patent LAD and LCX   Normal EF     Dr. Berrios for NSTEMI       DAPT score 3           Resolved Hospital Problems   No resolved problems to display.       Patient is homebound due to:  ESRD (end stage renal disease)    Allergies:  Review of patient's allergies indicates:   Allergen Reactions    Clindamycin Diarrhea    Flagyl [metronidazole hcl]     Metronidazole        Vitals:      Every shift (Skilled Nursing patients)    Diet: Renal Diet   Supplement:  1 can every three times a day with meals                         Type:   Nepro     Acitivities:   - As tolerated    - Up in a chair each morning as tolerated   - Ambulate with assistance to bathroom   - Scheduled walks once each shift (every 8  hours)   - May ambulate independently   - May use walker, cane, or self-propelled wheelchair      LABS:  Per facility protocol, would check renal function 3 times a week for one week    Nursing Precautions:    - Aspiration precautions:             - Total assistance with meals            -  Upright 90 degrees befor during and after meals             -  Suction at bedside          - Fall precautions per nursing home protocol   - Seizure precaution per [facility] protocol   - Decubitus precautions:        -  for positioning   - Pressure reducing foam mattress   - Turn patient every two hours. Use wedge pillows to anchor patient    CONSULTS:    Physical Therapy to evaluate and treat     Occupational Therapy to evaluate and treat    MISCELLANEOUS CARE:     Routine Skin for Bedridden Patients:  Apply moisture barrier cream to all    skin folds and wet areas in perineal area daily and after baths and                           all bowel movements.    WOUND CARE:    Abdominal wounds - every MWF pack with aquacel ag packing strip and cover with foam dressing.     Recommend continuing Triad barrier cream to stage 2 pressure injury to right buttocks BID/prn. Indurated area noted to left buttocks, appears to be a boil, approx 1cm x 0.5cm. Area does not appear to need an I&D at this time but if area continues to become larger, I&D may be necessary in future.                    DIABETES CARE:      Check blood sugar:    Fingerstick blood sugar AC and HS   Fingerstick blood sugar every 6 hours if unable to eat      Report CBG < 60 or > 400 to physician.                                          Insulin Sliding Scale          Glucose  Novolog Insulin Subcutaneous        0 - 60   Orange juice or glucose tablet, hold insulin      No insulin   201-250  2 units   251-300  4 units   301-350  6 units   351-400  8 units   >400   10 units then call physician      Medications: Discontinue all previous medication orders, if any.  See new list below.     sodium chloride 0.9%   Intravenous Once    atorvastatin  40 mg Oral QHS    clopidogrel  75 mg Oral Daily    epoetin adonay (PROCRIT) injection  100 Units/kg Intravenous Every Tues, Thurs, Sat    insulin detemir U-100  10 Units Subcutaneous QHS    pantoprazole  40 mg Oral Daily    sevelamer carbonate  800 mg Oral TID WM    tuberculin  5 Units Intradermal Once           _________________________________  Guevara Metzger MD  04/04/2019

## 2019-04-04 NOTE — DISCHARGE SUMMARY
[facility]  Discharge Summary  Hospital Medicine    Patient Name: Jenni Todd  YOB: 1970    Admit Date: 3/18/2019    Discharge Date and Time: 4/4/2019    Discharge Attending Physician: Guevara Metzger MD     Team: [facility] HOSP MED A    Reason for Admission: Abdominal pain and peritonitis, needs HD    Active Hospital Problems    Diagnosis  POA    *ESRD (end stage renal disease) on PD ( initiated dialysis 04/20/2004) [N18.6]  Yes     Nightly PD      NSTEMI (non-ST elevated myocardial infarction) [I21.4]  No    Other specified anemias [D64.89]  Yes    Pressure injury of left buttock, stage 2 [L89.322]  Yes    Delayed surgical wound healing [T81.89XA]  Yes    Chronic systolic heart failure [I50.22]  Yes    Acute gastric ulcer with hemorrhage [K25.0]  Yes    Antibiotic-associated diarrhea [K52.1, T36.95XA]  Yes    Hemodialysis catheter dysfunction [T82.41XA]  Yes    Peritonitis associated with peritoneal dialysis [T85.71XA]  Yes    Renovascular hypertension [I15.0]  Yes    Type 2 diabetes 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, with long-term current use of insulin [E11.22, N18.6, Z99.2, Z79.4]  Not Applicable    CAD (coronary artery disease) [I25.10]  Yes     Cath 12/27/2012:   RCA PCI 2.5 x 18 and 3.0 x 26 mm BMS   Patent LAD and LCX   Normal EF     Dr. Berrios for NSTEMI       DAPT score 3           Resolved Hospital Problems   No resolved problems to display.       HPI:   Jenni Todd is a 48 y.o. female w/ CAD s/p 2 PCI (2012), HFrEF 30%, HTN, DM, and ESRD on home PD admitted to [facility] on 2/21 for peritonitis. PD catheter removed. Due to hx of recurrent peritonitis she was transitioned to HD. Bilateral IJ tunneled catheters were attempted without success. Now has a temporary dialysis catheter in her left fem. Transferred to [facility] for vascular. Nephrology access team attempted a tunneled line and were unsuccessful. Vascular surgery consulted  "for HD access. Hospital course complicated by GIB with low risk ulcer. S/p OR 3/22 per vascular for RUE graft (Arsen). 3/28 groin perm-cath placed. NSTEMI 3/29.    Hospital Course:   See problem list below.    Principal Problem: ESRD (end stage renal disease)    Other Problems Addressed:  ESRD on HD  Status post removal of PD catheter for concern regarding mutliple organsims growing in peritoneal fluid. Attempts at tunneled catheter in IJ x 2 during this admission. Left femoral trialysis placed. S/p OR 3/22 per vascular for RUE graft (Arsen). 3/28 groin perm-cath placed.   - Pt refuses renal diet; prefers diabetic  - Nephrology following, ESRD mgmt as per nephro  - No IVs in R arm  - RUQ graft is clotted per US; Surgery wish to fix it as an outpt.  Surgery does not think pt has good anatomy for vascular acces and wish for her to see Dr. Alonso for new PD placement in clinic.      NSTEMI (non-ST elevated myocardial infarction)  3/29 Tn 0.2>1.0  after a tachy followed by hypotensive episode the day after her groin perm-cath was placed. cards consulted 3/29 "recommend continuing medical therapy with Plavix 75 mg QD and Atorvastatin 40 mg QD; would avoid ASA given recent GIB .  -unable to initiate GDMT for HFrEF given soft BP, will attempt to resume home beta-blocker, ACE-I/ARB and potassium sparing agent when able and ischemic evaluation at future time point"  - blood culture collected in case of infection from recent procedure; NGTD   - per cards "Please ensure she follows up with her Cardiologist, Dr Calos Gavin on discharge to set up a stress test."     Peritonitis due to peritoneal dialysis catheter   - Finished course of cefepime as per ID  - Wound care consulted for PD site wound care     CAD  - remote stents.   - Was on DAPT but held due to GIB 3/13 and need for procedures here. Only plavix restarted given recent GIB per cards recs 3/29.    - holding BB due to recent low BP     Acute gastric ulcer " with hemorrhage  GIB  - no more bleeding, on PPI  - biopsy neg for malignancy or H.pylori     Pressure injury stage 2 on left buttock   - consulted wound care     Chronic systolic heart failure  - EF 30% on TTE, stable  - Hold antihypertensive due to low BP, resume as able, can be done OP     Diabetes mellitus, type 2  - A1c 6.3, was getting intra-peritoneal insulin. Catheter now removed.   - detemir 10 units nightly      Debility  Generalized weakness  - PT OT to reassess  - PT rec SNF. [facility] accepted patient.      Hypotension - resolved  Antibiotic-associated diarrhea - resolved, now soft stool - resolved  - Multiple episodes of hypotension suspecting from recurrent diarrhea, dialysis and maybe PVD  - Fluids prn and loperamide, continue to monitor   - Not on any antihypertensives at this time and HH stable  - Has been C diff negative.    - Barrier creams and antidiarrheals ordered          Discharge plan and follow up - SNF  -  Cardiologist - Dr Calos Gavin on discharge to set up a stress test.  -  Dr. Alonso for new PD placement in clinic.       Procedures Performed: Procedure(s) (LRB):  Insertion, Catheter, Central Venous, Hemodialysis (N/A)    Special Care, Treatment, and Services Provided: None    Consults: vascular, nephrology, wound care, cardiology, IR, GI    Significant Diagnostic Studies:  No results found for: EF  Hemoglobin A1C   Date Value Ref Range Status   09/12/2018 6.3 (H) 4.0 - 5.6 % Final     Comment:     ADA Screening Guidelines:  5.7-6.4%  Consistent with prediabetes  >or=6.5%  Consistent with diabetes  High levels of fetal hemoglobin interfere with the HbA1C  assay. Heterozygous hemoglobin variants (HbS, HgC, etc)do  not significantly interfere with this assay.   However, presence of multiple variants may affect accuracy.     11/30/2017 7.9 (H) 4.0 - 5.6 % Final     Comment:     According to ADA guidelines, hemoglobin A1c <7.0% represents  optimal control in non-pregnant diabetic patients.  "Different  metrics may apply to specific patient populations.   Standards of Medical Care in Diabetes-2016.  For the purpose of screening for the presence of diabetes:  <5.7%     Consistent with the absence of diabetes  5.7-6.4%  Consistent with increasing risk for diabetes   (prediabetes)  >or=6.5%  Consistent with diabetes  Currently, no consensus exists for use of hemoglobin A1c  for diagnosis of diabetes for children.  This Hemoglobin A1c assay has significant interference with fetal   hemoglobin   (HbF). The results are invalid for patients with abnormal amounts of   HbF,   including those with known Hereditary Persistence   of Fetal Hemoglobin. Heterozygous hemoglobin variants (HbAS, HbAC,   HbAD, HbAE, HbA2) do not significantly interfere with this assay;   however, presence of multiple variants in a sample may impact the %   interference.       CBC:   Recent Labs   Lab 04/04/19  0630   WBC 9.47   RBC 2.76*   HGB 8.2*   HCT 27.0*   *   MCV 98   MCH 29.7   MCHC 30.4*     BMP:   Recent Labs   Lab 03/31/19  0730  04/04/19  0630   *   < > 116*      < > 135*   K 4.3   < > 4.9   CL 99   < > 101   CO2 26   < > 23   BUN 29*   < > 29*   CREATININE 8.0*   < > 8.7*   CALCIUM 9.0   < > 9.3   MG 1.6  --   --     < > = values in this interval not displayed.       Final Diagnoses: Same as principal problem.    Discharged Condition: stable  Face to face services were provided on 4/4/2019   Time Spent:  I spent > 30 minutes on the discharge, which included reviewing hospital course with patient/family, reviewing discharge medications, and arranging follow-up care.  Physical Exam on 4/4/2019:  /66   Pulse 100   Temp 98.7 °F (37.1 °C) (Oral)   Resp 16   Ht 5' 7" (1.702 m)   Wt 93 kg (205 lb)   LMP 01/28/2014 (Approximate)   SpO2 99%   Breastfeeding? No   BMI 32.11 kg/m²     GEN: in no acute distress. Nontoxic. Resting in bed. Cooperative.  HEENT: NCAT. PERRL. EOMI. Conjunctivae/corneas clear, " sclera Anicteric.  CVS: RRR. Normal s1 s2 no murmur, click, rub or gallop  LUNG: CTAB. Normal respiratory effort. No wheezes, rhonchi, or crackles.  ABD: PD site noted. Normoactive BS, soft, NT, ND, no masses or organomegaly.  EXT: No edema. No cyanosis. Full ROM.   SKIN: color, texture, turgor normal. No rashes or lesions  NEURO: Alert, oriented x 4, Spont mvt of all extremities with no focal deficits noted.    Disposition: Skilled Nursing Facility    Follow Up Instructions:   Follow-up Information     BESSIE Wynn Iii, MD In 2 weeks.    Specialty:  Vascular Surgery  Why:  with quantitative duplex  Contact information:  [address]  [facility] [id number]  [phone]             Schedule an appointment as soon as possible for a visit with Sung Alonso MD.    Specialties:  General Surgery, Bariatrics  Why:  surgeon to place new PD catheter  Contact information:  [address]  [facility] [id number]  [phone]                 Future Appointments   Date Time Provider Department Center   4/9/2019  9:00 AM VASCULAR, LAB [facility] KYLIE Moreno   4/9/2019  9:30 AM [facility]       Medications to SNF:   sodium chloride 0.9%   Intravenous Once    atorvastatin  40 mg Oral QHS    clopidogrel  75 mg Oral Daily    epoetin adonay (PROCRIT) injection  100 Units/kg Intravenous Every Tues, Thurs, Sat    insulin detemir U-100  10 Units Subcutaneous QHS    pantoprazole  40 mg Oral Daily    sevelamer carbonate  800 mg Oral TID WM    tuberculin  5 Units Intradermal Once       Discharge Instructions:  Discharge plan and follow up - [facility]  -  Cardiologist - Dr Calos Gavin on discharge to set up a stress test.  -  Dr. Alonso for new PD placement in clinic.     Discharge Procedure Orders   Ambulatory Referral to General Surgery   Referral Priority: Routine Referral Type: Consultation   Referral Reason: Specialty Services Required   Referred to Provider: BELÉN  PETER HODGSON Requested Specialty: General Surgery   Number of Visits Requested: 1         Guevara Metzger MD  Department of Hospital Medicine

## 2019-04-05 NOTE — PLAN OF CARE
Problem: Adult Inpatient Plan of Care  Goal: Plan of Care Review  Outcome: Ongoing (interventions implemented as appropriate)  Care plan reviewed with patient.  Patient oriented to the unit protocol.  Dialysis scheduled for 9:45 tomorrow morning. Patient and staff aware of appt time.  Patient seen by NP and Dietary.

## 2019-04-05 NOTE — PT/OT/SLP EVAL
PhysicalTherapy   Evaluation/tx    Jenni Todd   MRN: [id number]     PT Received On: 19  PT Start Time: 52     PT Stop Time: 1045    PT Total Time (min): 53 min       Billable Minutes:  Evaluation 8, Gait Training 15, Therapeutic Activity 15 and Therapeutic Exercise 15    Diagnosis: Peritoneal catheter dysfunction  S/p insertion of hemodialysis catheter (central venous)  Past Medical History:   Diagnosis Date    Abnormal finding on Pap smear, HPV DNA positive     Anemia associated with chronic renal failure     on Epogen    Blood type B+     Bulging discs - symptomatic      CAD (coronary artery disease)     Cardiomyopathy 3/13/2019    Diabetes mellitus, type 2     ESRD (end stage renal disease) 2004    FSGS (focal segmental glomerulosclerosis)     with collapsing    Hyperlipidemia     Hypertension     Neuropathy     NSTEMI (non-ST elevated myocardial infarction) 3/29/2019    Obesity     Secondary hyperparathyroidism, renal     TIA (transient ischemic attack)     Uterine fibroid     small uterine       Past Surgical History:   Procedure Laterality Date    CARDIAC CATHETERIZATION      PCI x 2     SECTION, CLASSIC      COLONOSCOPY N/A 2018    Performed by Rodrigue Russell MD at [facility] ENDO (2ND FLR)    DEBRIDEMENT-WOUND Left 2016    Performed by [facility]ressa Maddox [facility] at [facility] OR    DIALYSIS FISTULA CREATION      multiple fistulas and grafts before PD     EGD (ESOPHAGOGASTRODUODENOSCOPY) N/A 3/14/2019    [facility] by [facility]olph [facility]ila [facility] at [facility] ENDO    EGD (ESOPHAGOGASTRODUODENOSCOPY) N/A 3/13/2019    Performed by [facility]olph [facility]ila [facility] at [facility] ENDO    INCISION AND DRAINAGE (I&D), LABIAL N/A 2016    Performed by Harmony Fernandez MD at [facility] OR    INCISION AND DRAINAGE (I&D), LABIAL (ADD ON) Left 2016    Performed by Teressa Maddox MD at [facility] OR    INCISION AND DRAINAGE OF WOUND Left     VULVAR ABCESS WITH NECROSIS    Insertion,  Catheter, Central Venous, Hemodialysis N/A 3/28/2019    Performed by [facility]o Weeks [facility] at [facility] CATH LAB    Insertion, Catheter, Central Venous, Hemodialysis N/A 3/18/2019    Performed by [facility]eks [facility] at [facility] CATH LAB    INSERTION, CATHETER, TUNNELED ABORTED Left 3/14/2019    Performed by Servando Solis MD at [facility] OR    INSERTION, GRAFT, ARTERIOVENOUS, RIGHT ARM Right 3/22/2019    Performed by BESSIE Wynn III, MD at [facility] OR 2ND FLR    ORIF, HIP Left 9/13/2018    [facility]ullon [facility] at [facility] OR    PERITONEAL CATHETER INSERTION      PERMCATH REWIRE- TUNNELED CATH REWIRE Left 11/13/2017    Performed by Baldev Munroe MD at [facility] CATH LAB    PERMCATH REWIRE- TUNNELED CATH REWIRE N/A 10/5/2017    Performed by Baldev [facility]roe [facility] at [facility] CATH LAB    REMOVAL, CATHETER, DIALYSIS, PERITONEAL N/A 3/14/2019    Performed by [facility] Solis [facility] at [facility] OR    UMBILICAL HERNIA REPAIR      Venogram, possible intervention Right 3/20/2019    Performed by BESSIE Wynn III, MD at [facility] CATH LAB         General Precautions: Standard, fall  Orthopedic Precautions: N/A   Braces: N/A    Spiritual, Cultural Beliefs, Restorationism Practices, Values that Affect Care: no    Patient History:  Lives With: (step-daughter)  Living Arrangements: house  Home Accessibility: (1 threshold)  Home Layout: Able to live on 1st floor  Stair Railings at Home: none  Financial Concerns: none  Transportation Anticipated: family or friend will provide  Living Environment Comment: Pt lives with step-dtr in 1 , 1 [address]. Works full-time at the [employer]. Was [employer]. Owns electric scooter for work, manual WC for community use, built-in seat for shower, has a rollator, and grab bars in bathroom. Shower is walk-in.  Her father lives 1 Mi away.   Equipment Currently Used at Home: rollator, wheelchair, shower chair, grab bar(electric scooter)  DME owned (not currently used): none    Previous Level of Function:  Ambulation  "Skills: independent  Transfer Skills: independent  ADL Skills: independent  Work/Leisure Activity: independent    Subjective:  Communicated with nurse prior to session.  Chief Complaint: Fear of falling.  Patient goals: Wants to "gain confidence to be able to get around as needed".    Pain/Comfort  Pain Rating 1: 0/10    Objective:  Patient found seated in WC.      Cognitive Exam:  Oriented to: Person, Place, Time and Situation  Follows Commands/attention: Follows multistep  commands  Communication: clear/fluent  Safety awareness/insight to disability: intact    Physical Exam:  Postural examination/scapula alignment: -       Rounded shoulders  -       Forward head    Skin integrity: Visible skin intact  Edema: Moderate in LEs and in abdomen.    Sensation:   -       Intact upon touch but pt reports neuropathy    Upper Extremity Range of Motion:  Right Upper Extremity: WNL  Left Upper Extremity: WNL    Upper Extremity Strength:  Right Upper Extremity: WNL  Left Upper Extremity: WNL    Lower Extremity Range of Motion:  Right Lower Extremity: WNL  Left Lower Extremity: WNL    Lower Extremity Strength:  Right Lower Extremity: WNL  Left Lower Extremity: WNL     Fine motor coordination:  -       Intact    Gross motor coordination: WFL      AM-PAC 6 CLICK MOBILITY  Total Score:17    Bed Mobility:  Sit>Supine: SBA  Supine>Sit: SBA    Transfers:  Sit<>Stand: CGA  Stand Pivot Transfer: CGA with UE support    Gait:  Amb 40 feet x 2 trials (seated rest break on rollator in between) using rollator with SBA-CGA  Vitals after first trial of ambulation: 100% SpO2, 123 bpm HR    Wheelchair Mobility:  Patient propels w/c 100 feet with use of UEs and supervision     Therex:  · Standing- hip flexion x 5 reps- 2trials BLE  · Seated- hip flexion, long arc quads, ankle pumps with knee extension x 20 reps BLE  (sitting away from back of chair to encourage use of core muscles)- needed breaks in between    Balance:  Needs to use an assistive " device and requires frequent sitting breaks.      Additional Treatment:  White board updated with (A) level.  Educated on frequency of therapies, safety of mobility while on the unit; educated about family training as well upon DC.      Patient left up in chair with call button in reach.    Assessment:  Jenni Todd is a 48 y.o. female with a medical diagnosis of peritoneal dialysis catheter dysfunction.  Ms. Todd presents with the deficits listed below, which warrants skilled therapy services prior to discharging home and returning to work full-time.     Rehab identified problem list/impairments: weakness, impaired endurance, impaired self care skills, impaired sensation, impaired functional mobilty, gait instability, impaired balance, decreased lower extremity function, edema(fall history- 2018)    Rehab potential is good.    Activity tolerance: Good    Discharge recommendations: home health PT     Barriers to discharge: Decreased caregiver support    Equipment recommendations: walker, rolling, commode     GOALS:   Multidisciplinary Problems     Physical Therapy Goals        Problem: Physical Therapy Goal    Goal Priority Disciplines Outcome Goal Variances Interventions   Physical Therapy Goal     PT, PT/OT Ongoing (interventions implemented as appropriate)     Description:  Goals to be met by: -  days    Patient will increase functional independence with mobility by performin. Supine to sit with Modified [place].  2*. Sit to supine with Modified [place].  3. Sit to stand transfer with Modified [place].  4. Bed to chair transfer with Modified [place] using Rolling Walker/rollator.  5. Gait  x 150 feet with Supervision using Rolling Walker/rollator.   6. Wheelchair propulsion x150 feet with Modified [place] using bilateral upper extremities.  7. Ascend/Descend 4 inch curb step with Stand-by Assistance using Rolling Walker/rollator.  8. Stand for 3 minutes with  Supervision using unilateral upper extremity support while performing a dynamic task.  9. Lower extremity exercise program x 20 reps per handout, with independence.  10. Pt will improve her gait speed from 0.72 m/s to 0.85 m/s with use of rollator to demonstrate progress in her overall functional mobility and a lower fall risk.                     PLAN:    Patient to be seen 5 x/week  to address the above listed problems via gait training, therapeutic activities, therapeutic exercises, neuromuscular re-education, wheelchair management/training  Plan of Care Expires: 05/05/19    Nicole Cabezas, PT 4/5/2019

## 2019-04-05 NOTE — PLAN OF CARE
Problem: Occupational Therapy Goal  Goal: Occupational Therapy Goal  Goals to be met by: 4/17     Patient will increase functional independence with ADLs by performing:    UE Dressing with Modified [place].  LE Dressing with Supervision.  Grooming while standing at sink with Modified [place].  Toileting from bedside commode with Modified [place] for hygiene and clothing management.   Bathing with Supervision.  Supine to sit with Modified [place]/c HOB flat and no handrails.  Stand pivot transfers with Modified [place].  Toilet transfer to bedside commode with Modified [place].  Upper extremity exercise program 3 x 10 reps per handout, with independence.  Patient will complete a functional standing activity for 10 min with S in order to perform self care tasks.   Outcome: Ongoing (interventions implemented as appropriate)  Patient's goals are set.   SABRA Estes  4/5/2019

## 2019-04-05 NOTE — H&P
[facility]                                  Skilled Nursing Facility                   Progress Note     Admit Date: 4/4/2019  JESSY TBD   Principal Problem:  <principal problem not specified>   HPI obtained from patient interview and chart review     Chief Complaint: Establish Care/ Initial Visit     HPI:   Jenni Todd is a 48 year old female w/ CAD s/p 2 PCI (2012), HFrEF 30%, HTN, DM, and ESRD who was on home PD who presents to SNF following a hospitalization for peritonitis.  She was originally admitted to [facility] on 2/21 for peritonitis. PD catheter removed. Due to hx of recurrent peritonitis she was transitioned to HD. Bilateral IJ tunneled catheters were attempted without success. Now has a temporary dialysis catheter in her left fem. Transfer[place] [facility] [place]. Nephrology access team attempted a tunneled line and were unsuccessful. Vascular surgery consulted for HD access. Hospital course complicated by GIB with low risk ulcer. S/p OR 3/22 per vascular for RUE graft ([place]). 3/28 groin perm-cath placed. NSTEMI 3/29. Pt refuses renal diet; prefers diabetic. No IVs in R arm. RUQ graft is clotted per US; Surgery wish to fix it as an outpt.  Surgery does not think pt has good anatomy for vascular acces and wish for her to see Dr. Alonso for new PD placement in clinic. Pt will f/u in vascular clinic on 4/12.     Patient will be treated at [facility] with PT and OT to improve functional status and ability to perform ADLs.     Past Medical History: Patient has a past medical history of Abnormal finding on Pap smear, HPV DNA positive (2014), Anemia associated with chronic renal failure, Blood type B+, Bulging discs - symptomatic , CAD (coronary artery disease), Cardiomyopathy (3/13/2019), Diabetes mellitus, type 2, ESRD (end stage renal disease) (04/20/2004), FSGS (focal segmental glomerulosclerosis),  Hyperlipidemia, Hypertension (2004), Neuropathy, NSTEMI (non-ST elevated myocardial infarction) (3/29/2019), Obesity, Secondary hyperparathyroidism, renal, TIA (transient ischemic attack), and Uterine fibroid.    Past Surgical History: Patient has a past surgical history that includes Peritoneal catheter insertion; Umbilical hernia repair;  section, classic; Dialysis fistula creation; Cardiac catheterization; Incision and drainage of wound (Left, ); Open reduction and internal fixation (ORIF) of injury of hip (Left, 2018); Colonoscopy (N/A, 2018); Esophagogastroduodenoscopy (N/A, 3/13/2019); Esophagogastroduodenoscopy (N/A, 3/14/2019); Peritoneal catheter removal (N/A, 3/14/2019); Insertion of tunneled central venous hemodialysis catheter (N/A, 3/18/2019); Phlebography (Right, 3/20/2019); Placement of arteriovenous graft (Right, 3/22/2019); and Insertion of tunneled central venous hemodialysis catheter (N/A, 3/28/2019).    Social History: Patient reports that she has never smoked. She has never used smokeless tobacco. She reports that she does not drink alcohol or use drugs.    Family History: family history includes Cancer in her maternal grandmother and paternal grandfather; Diabetes in her maternal aunt and paternal aunt.    Allergies: Patient is allergic to clindamycin; flagyl [metronidazole hcl]; and metronidazole.    ROS  Constitutional: Negative for fever and malaise/fatigue.   Eyes: Negative for blurred vision, double vision and discharge.   Respiratory: Negative for cough, shortness of breath and wheezing.    Cardiovascular: Negative for chest pain, palpitations, claudication, leg swelling and PND.   Gastrointestinal: Negative for abdominal pain, constipation, diarrhea, nausea and vomiting.   Genitourinary: Negative for dysuria, frequency and urgency.   Musculoskeletal:  + generalized weakness. Negative for back pain and myalgias.   Skin: Negative for itching and rash.   Neurological:  "Negative for dizziness, speech change, seizures, and headaches.   Psychiatric/Behavioral: Negative for depression. The patient is not nervous/anxious.      PEx  Temp:  [98.3 °F (36.8 °C)-98.6 °F (37 °C)]   Pulse:  []   Resp:  [17-20]   BP: (106-114)/(56-74)   SpO2:  [100 %]      Constitutional: Patient appears well-developed and well-nourished.   HENT:   Head: Normocephalic and atraumatic.   Eyes: Pupils are equal, round, and reactive to light.   Neck: Normal range of motion. Neck supple.   Cardiovascular: Normal rate, regular rhythm and normal heart sounds.    Pulmonary/Chest: Effort normal and breath sounds are clear  Abdominal: Soft. Bowel sounds are normal.   Musculoskeletal: Normal range of motion.   Neurological: Alert and oriented to person, place, and time.   Skin: Skin is warm and dry. RUE graft without thrill or bruit  Psychiatric: Normal mood and affect. Behavior is normal.       Assessment and Plan:       NSTEMI (non-ST elevated myocardial infarction)  - 3/29 Tn 0.2>1.0  after a tachy followed by hypotensive episode the day after her groin perm-cath was placed. cards consulted 3/29 "recommend continuing medical therapy with Plavix 75 mg QD and Atorvastatin 40 mg QD; would avoid ASA given recent GIB .    - per cards "Please ensure she follows up with her Cardiologist, Dr Calos Gavin on discharge to set up a stress test."     Peritonitis due to peritoneal dialysis catheter   - Finished course of cefepime as per ID  - Wound care consulted for PD site wound care     CAD  - remote stents.   - Was on DAPT but held due to GIB 3/13 and need for procedures here. Only plavix restarted given recent GIB per cards recs 3/29.    - holding BB due to recent low BP     Acute gastric ulcer with hemorrhage  GIB  - no more bleeding, on PPI  - biopsy neg for malignancy or H.pylori     Pressure injury stage 2 on left buttock   - consulted wound care     Chronic systolic heart failure  - EF 30% on TTE, stable  - Hold " antihypertensive due to low BP, resume as able, can be done OP     Diabetes mellitus, type 2  - A1c 6.3, was getting intra-peritoneal insulin. Catheter now removed.   - detemir 10 units nightly      Debility  Generalized weakness  Debility   - Continue with PT/OT for gait training and strengthening and restoration of ADL's   - Encourage mobility, OOB in chair, and early ambulation as appropriate  - Fall precautions   - Monitor for bowel and bladder dysfunction  - Monitor for and prevent skin breakdown and pressure ulcers  - Continue DVT prophylaxis with teds/scds given recent GIB    Diabetic neuropathy  - initiated home gabapentin 800 mg t.i.d.     F/U- Cardiologist - Dr Calos Gavin on discharge to set up a stress test. And Dr. Alonso for new PD placement in clinic    I certify that SNF services are required to be given on an inpatient basis because Jenni Todd needs for skilled nursing care and/or skilled rehabilitation are required on a daily basis and such services can only practically be provided in a skilled nursing facility setting and are for an ongoing condition for which she received inpatient care in the hospital.     68 minutes spent in the care of the patient (Greater than 1/2 spent in non direct face-to-face contact)    36 of 68 minutes spent on documentation and counseling patient on clinical conditions and therapies provided. The remainder of the time was spent in direct patient care.     Julieta Donnelly NP

## 2019-04-05 NOTE — PLAN OF CARE
Problem: Adult Inpatient Plan of Care  Goal: Plan of Care Review  Outcome: Ongoing (interventions implemented as appropriate)    Increased nutrient needs related to wound healing as evidenced by surgical wound at PD site and stage II pressure injury to buttocks, with po intake not meeting increased needs, ONS refusal.         Recommendation/Intervention: Continue 2000 diabetic diet, encourage po intake,  Recommend renal vitamin, RD following If poor po intake recommend beneprotein TID.  Goals: PO to meet 85% of EEN/EPN by next RD visit

## 2019-04-05 NOTE — PLAN OF CARE
Problem: Adult Inpatient Plan of Care  Goal: Plan of Care Review  Outcome: Ongoing (interventions implemented as appropriate)     04/05/19 0510   Plan of Care Review   Plan of Care Reviewed With patient       Problem: Fall Injury Risk  Goal: Absence of Fall and Fall-Related Injury    Intervention: Promote Injury-Free Environment     04/05/19 0510   Optimize [place] and Functional Mobility   Environmental Safety Modification assistive device/personal items within reach   Optimize Balance and Safe Activity   Safety Promotion/Fall Prevention assistive device/personal item within reach;lighting adjusted;medications reviewed;instructed to call staff for mobility

## 2019-04-05 NOTE — CONSULTS
Wound care consult received from nursing for abdomen wounds.  Pt known to wound care team with wound care orders already in place for abdomen wounds and R/L buttocks.  See wound care note from this week, 4/3/19 with concern for increased depth to lower abdomen wound as well as indurated area to L buttocks.  Spoke with pt today and discussed plan to continue with orders in place.  Pt states less pain to buttocks and refusing the waffle overlay stating it is uncomfortable. Pt verbalized understanding to off-load buttocks with repositioning side to side.  Wound care team to follow prn  [id number]

## 2019-04-05 NOTE — PROGRESS NOTES
"Foam dressing to right abdomen intact, no drainage noted. Patient states dressing is to be changed on mon ,wed and fri and would like to see wound care.   There was no dressing on buttocks found, sacrum pick and triad cream present. Patient stated that previous wound care was not applying a dressing , they were letting it " air out". Patient also states she has been having loose bowels and requested immodium. Patient stated she had several episodes before coming to this floor.     Patient would also like to have dialysis orders cleared up , to state she goes on Tuesday, Thursday and Saturday.     Patient does NOT want a waffle mattress on her bed , patient states " the waffle mattress is very uncomfortable ".     Will pass on in report. Will continue to monitor.   "

## 2019-04-05 NOTE — PLAN OF CARE
Problem: Physical Therapy Goal  Goal: Physical Therapy Goal  Goals to be met by: -  days    Patient will increase functional independence with mobility by performin. Supine to sit with Modified [place].  2*. Sit to supine with Modified [place].  3. Sit to stand transfer with Modified [place].  4. Bed to chair transfer with Modified [place] using Rolling Walker/rollator.  5. Gait  x 150 feet with Supervision using Rolling Walker/rollator.   6. Wheelchair propulsion x150 feet with Modified [place] using bilateral upper extremities.  7. Ascend/Descend 4 inch curb step with Stand-by Assistance using Rolling Walker/rollator.  8. Stand for 3 minutes with Supervision using unilateral upper extremity support while performing a dynamic task.  9. Lower extremity exercise program x 20 reps per handout, with independence.  10. Pt will improve her gait speed from 0.72 m/s to 0.85 m/s with use of rollator to demonstrate progress in her overall functional mobility and a lower fall risk.   Outcome: Ongoing (interventions implemented as appropriate)  Goals set based on presentation today during evaluation and prior level of function.

## 2019-04-05 NOTE — PT/OT/SLP DISCHARGE
Physical Therapy Discharge Summary    Name: Jenni Todd  MRN: [id number]   Principal Problem: ESRD (end stage renal disease)     Patient Discharged from acute Physical Therapy on 2019.  Please refer to prior PT noted date on 4/3/2019 for functional status.     Assessment:     Patient was discharged unexpectedly.  Information required to complete an accurate discharge summary is unknown.  Refer to therapy initial evaluation and last progress note for initial and most recent functional status and goal achievement.  Recommendations made may be found in medical record.    Objective:     GOALS:   Multidisciplinary Problems     Physical Therapy Goals        Problem: Physical Therapy Goal    Goal Priority Disciplines Outcome Goal Variances Interventions   Physical Therapy Goal     PT, PT/OT Ongoing (interventions implemented as appropriate)     Description:  Goals to be met by: 2019     Patient will increase functional independence with mobility by performin. Supine to sit with Modified [place]  2. Sit to supine with Modified [place]  3. Sit to stand transfer with Modified [place]  4. Gait  x 150 feet with Supervision using Rolling Walker.   5. Ascend/Descend 4 inch curb step with Supervision using Rolling Walker.                       Reasons for Discontinuation of Therapy Services  Transfer to alternate level of care.      Plan:     Patient Discharged to: Skilled Nursing Facility.    Adolph Zayas, PT  2019

## 2019-04-05 NOTE — CONSULTS
"  [facility] - Skilled Nursing Care  Adult Nutrition  Consult Note    SUMMARY     Recommendations    Recommendation/Intervention: Continue 2000 diabetic diet, encourage po intake,  Recommend renal vitamin, RD following If poor po intake recommend beneprotein TID.  Goals: PO to meet 85% of EEN/EPN by next RD visit  Nutrition Goal Status: new  Communication of RD Recs: reviewed with physician  Patient refuses oral supplement, discussed menu items she may order to improve po intake  Patient sees no reason she needs assistance with meals, pt reports her wound is improving now that there is no longer diarrhea.    Reason for Assessment    Reason For Assessment: consult  Diagnosis: (Debility, peritonitis)  Relevant Medical History: ESSRD on PD, HTN, DM, obesity, CHF, CAD, HLD, stage II buttocks  Interdisciplinary Rounds: did not attend  General Information Comments: Hemo dialysis here, po remains 50% , does not like hospital food and is tired of being in hospital, NFPE completed. nourished. pt states weight loss during hospitalization, does not have dry wt.   Nutrition Discharge Planning: DC on diabetic , renal diet    Nutrition Risk Screen    Nutrition Risk Screen: no indicators present    Nutrition/Diet History    Patient Reported Diet/Restrictions/Preferences: diabetic diet, renal  Food Preferences: no fish no mixed vegetables no cooked carrots  Spiritual, Cultural Beliefs, Religion Practices, Values that Affect Care: no  Vitamin/Mineral/Herbal Supplements: renal multi   Food Allergies: NKFA  Factors Affecting Nutritional Intake: decreased appetite    Anthropometrics    Temp: 98.3 °F (36.8 °C)  Height Method: Stated  Height: 5' 7" (170.2 cm)  Height (inches): 67 in  Weight Method: Standard Scale  Weight: 90.4 kg (199 lb 4.7 oz)  Weight (lb): 199.3 lb  Ideal Body Weight (IBW), Female: 135 lb  % Ideal Body Weight, Female (lb): 147.63 lb  BMI (Calculated): 31.3  BMI Grade: 30 - 34.9- obesity - grade I  Weight Loss: " unintentional  Usual Body Weight (UBW), k kg  % Usual Body Weight: 88.81  % Weight Change From Usual Weight: -11.37 %       Lab/Procedures/Meds    Pertinent Labs Reviewed: reviewed  Pertinent Labs Comments: BUN 21, Cr 7.0, albumn 1.7, PO4 5.5, glucose 128, Hg 8.3, Hct 27.3,   Pertinent Medications Reviewed: reviewed  Pertinent Medications Comments: statin, EPO, insulin, sevelaner, senna-docusate     Physical Findings/Assessment 19         Estimated/Assessed Needs    Weight Used For Calorie Calculations: 90.4 kg (199 lb 4.7 oz)  Energy Calorie Requirements (kcal):   Energy Need Method: [place]-St Jeor(x 1.25 (PAL))  Protein Requirements: 117g  Weight Used For Protein Calculations: 90.4 kg (199 lb 4.7 oz)(x 1.3g/kg)  Fluid Requirements (mL): per MD(pt reports she has a fluid limit at home, but manages it)  Estimated Fluid Requirement Method: RDA Method  RDA Method (mL):   CHO Requirement: - 50% of calories      Nutrition Prescription Ordered    Current Diet Order:  diabetic  Nutrition Order Comments: no renal diet ordered to increase menu choices with hx of poor po  Oral Nutrition Supplement: (refuses novasource renal and boost glucose)    Evaluation of Received Nutrient/Fluid Intake    Energy Calories Required: meeting needs  Protein Required: not meeting needs  Fluid Required: meeting needs  Tolerance: tolerating  % Intake of Estimated Energy Needs: 50 - 75 %  % Meal Intake: 50 - 75 %    Nutrition Risk    Level of Risk/Frequency of Follow-up: high     Assessment and Plan     Increased nutrient needs related to wound healing as evidenced by surgical wound at PD site and stage II pressure injury to buttocks.    Monitor and Evaluation    Food and Nutrient Intake: food and beverage intake  Food and Nutrient Adminstration: diet order  Physical Activity and Function: nutrition-related ADLs and IADLs  Anthropometric Measurements: weight change  Biochemical Data, Medical Tests and Procedures:  electrolyte and renal panel, inflammatory profile, gastrointestinal profile, glucose/endocrine profile  Nutrition-Focused Physical Findings: overall appearance     Malnutrition Assessment  Malnutrition Type: acute illness or injury          Energy Intake (Malnutrition): less than 75% for greater than or equal to 1 month   Orbital Region (Subcutaneous Fat Loss): well nourished  Upper Arm Region (Subcutaneous Fat Loss): well nourished  Thoracic and Lumbar Region: well nourished   [place] Region (Muscle Loss): well nourished  Clavicle Bone Region (Muscle Loss): well nourished  Clavicle and Acromion Bone Region (Muscle Loss): well nourished  Scapular Bone Region (Muscle Loss): well nourished  Dorsal Hand (Muscle Loss): well nourished  Patellar Region (Muscle Loss): well nourished  Anterior Thigh Region (Muscle Loss): well nourished  Posterior Calf Region (Muscle Loss): well nourished   Edema (Fluid Accumulation): 2-->mild   Subcutaneous Fat Loss (Final Summary): well nourished  Muscle Loss Evaluation (Final Summary): well nourished  Fluid Accumulation Evaluation: mild         Nutrition Follow-Up     yes

## 2019-04-06 PROBLEM — T82.9XXA COMPLICATION OF VASCULAR ACCESS FOR DIALYSIS: Status: ACTIVE | Noted: 2019-01-01

## 2019-04-06 NOTE — NURSING
Received pt from ER via stretcher at 18:30. She is awake and alert with unlabored respirations on rm air.Pt has dressing to upper and lower (R) abdomen; they are dry and intact. Dialysis catheter with clean, dry dressing. Oriented pt to room and how to call for food tray. No immediate concerns at this time.

## 2019-04-06 NOTE — NURSING
Call made to ED nurse taking care of Ms Todd. Enquiry made in regards to whether patient will receive HD treatment at the [facility] when vascular assess and corrects issue with her femoral access. Nurse stated she has not received any information from the vascular team yet.

## 2019-04-06 NOTE — ED PROVIDER NOTES
Encounter Date: 2019       History     Chief Complaint   Patient presents with    Vascular Access Problem     Pt sent from dialysis-unable to complete treatment-states perma cath in left groin not working.   Pt was sent from rehab to dialysis-needs to go back to rehab.      The patient has a past medical history significant for ESRD on HD, that has dialysis every Tues, Th, & Sat reports that her last completed session was 2 days ago. She states that she went today, but was unable to complete dialysis due to malfunction of her Permacath. She states that she had the Permacath inserted about 2 weeks ago by nephrology. She denies any pain, redness, or swelling to area. She denies any physical complaints or additional concerns.         Review of patient's allergies indicates:   Allergen Reactions    Clindamycin Diarrhea    Flagyl [metronidazole hcl]     Metronidazole      Past Medical History:   Diagnosis Date    Abnormal finding on Pap smear, HPV DNA positive     Anemia associated with chronic renal failure     on Epogen    Blood type B+     Bulging discs - symptomatic      CAD (coronary artery disease)     Cardiomyopathy 3/13/2019    Diabetes mellitus, type 2     ESRD (end stage renal disease) 2004    FSGS (focal segmental glomerulosclerosis)     with collapsing    Hyperlipidemia     Hypertension     Neuropathy     NSTEMI (non-ST elevated myocardial infarction) 3/29/2019    Obesity     Secondary hyperparathyroidism, renal     TIA (transient ischemic attack)     Uterine fibroid     small uterine      Past Surgical History:   Procedure Laterality Date    CARDIAC CATHETERIZATION      PCI x 2     SECTION, CLASSIC      COLONOSCOPY N/A 2018    Performed by Rodrigue Russell [facility] at [facility] ENDO (2ND FLR)    DEBRIDEMENT-WOUND Left 2016    [facility]ox [facility] at [facility]    DIALYSIS FISTULA CREATION      multiple fistulas and grafts before PD     EGD  (ESOPHAGOGASTRODUODENOSCOPY) N/A 3/14/2019    Performed by Adolph Davila MD at [facility] ENDO    EGD (ESOPHAGOGASTRODUODENOSCOPY) N/A 3/13/2019    Performed by Adolph [facility]ila MD at [facility] ENDO    INCISION AND DRAINAGE (I&D), LABIAL N/A 4/17/2016    Performed by Harmony Fernandez MD at [facility] OR    INCISION AND DRAINAGE (I&D), LABIAL (ADD ON) Left 4/18/2016    Performed by Teressa Maddox MD at [facility] OR    INCISION AND DRAINAGE OF WOUND Left 2016    VULVAR ABCESS WITH NECROSIS    Insertion, Catheter, Central Venous, Hemodialysis N/A 3/28/2019    Performed by Kimo Weeks MD at [facility] CATH LAB    Insertion, Catheter, Central Venous, Hemodialysis N/A 3/18/2019    Performed by [facility] Weeks MD at [facility] CATH LAB    INSERTION, CATHETER, TUNNELED ABORTED Left 3/14/2019    Performed by Servando Solis MD at [facility] OR    INSERTION, GRAFT, ARTERIOVENOUS, RIGHT ARM Right 3/22/2019    Performed by BESSIE Wynn III, MD at [facility] OR 2ND FLR    ORIF, HIP Left 9/13/2018    Performed by Tevin Grullon MD at [facility] OR    PERITONEAL CATHETER INSERTION      PERMCATH REWIRE- TUNNELED CATH REWIRE Left 11/13/2017    Performed by Baldev Munroe MD at [facility] CATH LAB    PERMCATH REWIRE- TUNNELED CATH REWIRE N/A 10/5/2017    Performed by Baldev Munroe MD at [facility] CATH LAB    REMOVAL, CATHETER, DIALYSIS, PERITONEAL N/A 3/14/2019    Performed by Servando Solsi MD at [facility] OR    UMBILICAL HERNIA REPAIR      Venogram, possible intervention Right 3/20/2019    Performed by BESSIE Wynn III, MD at [facility] CATH LAB     Family History   Problem Relation Age of Onset    Cancer Maternal Grandmother     Cancer Paternal Grandfather     Diabetes Maternal Aunt     Diabetes Paternal Aunt     Kidney disease Neg Hx     Heart disease Neg Hx     Ovarian cancer Neg Hx     Breast cancer Neg Hx      Social History     Tobacco Use    Smoking status: Never Smoker    Smokeless tobacco: Never Used   Substance Use Topics     Alcohol use: No     Comment: Pt reports some social use of about 1-2 drinks about every six months.    Drug use: No     Review of Systems   Constitutional: Negative for chills, diaphoresis and fever.   Respiratory: Negative for chest tightness and shortness of breath.    Cardiovascular: Negative for chest pain and leg swelling.   Gastrointestinal: Negative for abdominal pain, diarrhea, nausea and vomiting.   Musculoskeletal: Negative for arthralgias, back pain and gait problem.   Skin: Negative for color change and rash.   Neurological: Negative for dizziness, syncope, speech difficulty, weakness, light-headedness, numbness and headaches.   Psychiatric/Behavioral: Negative for confusion.       Physical Exam     Initial Vitals [04/06/19 1358]   BP Pulse Resp Temp SpO2   100/63 (!) 116 20 98.3 °F (36.8 °C) 100 %      MAP       --         Physical Exam    Nursing note and vitals reviewed.  Constitutional: She appears well-developed and well-nourished. No distress.   HENT:   Head: Normocephalic.   Eyes: Conjunctivae are normal.   Cardiovascular: Intact distal pulses.   Pulmonary/Chest: No respiratory distress.   Abdominal: Soft. There is no tenderness.   Musculoskeletal: Normal range of motion. She exhibits no edema or tenderness.   Permacath to LLE: intact and non-tender; no swelling, erythema, increased warmth, or drainage to insertion site. No calf pain or swelling.    Neurological: She is alert and oriented to person, place, and time. She has normal strength. No sensory deficit.   Skin: Skin is warm and dry. No rash and no abscess noted. No erythema.   Psychiatric: She has a normal mood and affect.         ED Course   Procedures  Labs Reviewed   CBC W/ AUTO DIFFERENTIAL - Abnormal; Notable for the following components:       Result Value    RBC 3.00 (*)     Hemoglobin 9.0 (*)     Hematocrit 29.5 (*)     MCHC 30.5 (*)     RDW 17.0 (*)     Platelets 384 (*)     All other components within normal limits   BASIC  METABOLIC PANEL - Abnormal; Notable for the following components:    CO2 21 (*)     Glucose 142 (*)     BUN, Bld 25 (*)     Creatinine 8.3 (*)     eGFR if  5.9 (*)     eGFR if non  5.1 (*)     All other components within normal limits   PHOSPHORUS - Abnormal; Notable for the following components:    Phosphorus 5.2 (*)     All other components within normal limits   MAGNESIUM          Imaging Results          US Lower Extremity Veins Left (Final result)  Result time 04/06/19 17:00:50    Final result by Benjamin Davison MD (04/06/19 17:00:50)                 Impression:      No evidence of deep venous thrombosis in the left lower extremity.    Electronically signed by resident: Donavan Friedman  Date:    04/06/2019  Time:    16:54    Electronically signed by: Benjamin Davison MD  Date:    04/06/2019  Time:    17:00             Narrative:    EXAMINATION:  US LOWER EXTREMITY VEINS LEFT    CLINICAL HISTORY:  Unspecified complication of cardiac and vascular prosthetic device, implant and graft, initial encounter    TECHNIQUE:  Duplex and color flow Doppler evaluation and graded compression of the left lower extremity veins was performed.    COMPARISON:  CT scan abdomen pelvis dated 03/17/2019.    FINDINGS:  Left thigh veins: The common femoral, femoral, popliteal, upper greater saphenous, and deep femoral veins are patent and free of thrombus. The veins are normally compressible and have normal phasic flow and augmentation response.    Left calf veins: The visualized calf veins are patent.    Contralateral CFV: The contralateral (right) common femoral vein is patent and free of thrombus.    Miscellaneous: Known peritoneal dialysis line is visualized in the left superficial femoral vein.                                 Medical Decision Making:   History:   Old Medical Records: I decided to obtain old medical records.  Initial Assessment:   Pt with ESRD on HD here due to malfunction of permacath to LLE  and inability to complete dialysis today.  Differential Diagnosis:   Occlusion, malfunction, DVT, displacement, infection, etc   Clinical Tests:   Lab Tests: Ordered and Reviewed  Radiological Study: Ordered and Reviewed  ED Management:  US negative   I discussed the case in detail with the ER attending physician   I discussed the case with nephrology, who states that he will see the patient in the ER and that they will attempt to fix the access and to have the patient admitted to medicine, that the patient will require dialysis while in the hospital    states observation   I discussed the case in detail with hospital medicine, who requests that the patient be admitted under Dr Metzger  Other:   I have discussed this case with another health care provider.              [unfilled]        Clinical Impression:       ICD-10-CM ICD-9-CM   1. Complication of vascular access for dialysis, initial encounter T82.9XXA 996.73   2. ESRD (end stage renal disease) on dialysis N18.6 585.6    Z99.2 V45.11         Disposition:   Disposition: Placed in Observation  Condition: Stable                        Armaan Canales PA-C  04/06/19 1734

## 2019-04-06 NOTE — ED TRIAGE NOTES
Patient reports to the ED today with reports of Vascular Access Problem. Patient states that she was at dialysis this morning and received about 50min of treatment and was unable to finish was told her access was not working. Access to R leg. Patient dialysis schedule Tuesday Thursday Saturday. Last full treatment thursday.

## 2019-04-06 NOTE — H&P
Hospital Medicine  History and Physical Exam    Team: [facility] MED [place] MD  Admit Date: 4/6/2019  Principal Problem:  Complication of vascular access for dialysis   Patient information was obtained from patient and ER records.   Primary care Physician: Michael Tan Iii, MD  Code status: Full Code    HPI:   Jenni Todd is a 48 y.o. female w/ CAD s/p 2 PCI (2012), HFrEF 30%, HTN, DM2,  ESRD on home PD, recent NSTEMI and obesity who presents from [facility] with dialysis access issue.  Patient was recently discharged on April 4th with the dialysis access issues at that time as well.  She was discharged with a PermCath in her left femoral.  Patient notes that she was undergoing dialysis today and they could not get her were catheter to work.  She was given cath flow in each port with no improvement.  She was sent to the hospital for further evaluation.  Patient states they did do about 15 min of dialysis.  She last had dialysis on Thursday.  Patient denies any current chest pain, shortness of breath, nausea, vomiting or diarrhea.  She has been tolerating a diet without any difficulty.  She notes she has been participating in therapy without any issues.      In the ED, patient underwent ultrasound of the left lower extremity which was negative for DVT.  ED physician consulted Nephrology to come assess the PermCath.  Admission was requested for dialysis access issue.        Hemoglobin A1C   Date Value Ref Range Status   09/12/2018 6.3 (H) 4.0 - 5.6 % Final     Comment:     ADA Screening Guidelines:  5.7-6.4%  Consistent with prediabetes  >or=6.5%  Consistent with diabetes  High levels of fetal hemoglobin interfere with the HbA1C  assay. Heterozygous hemoglobin variants (HbS, HgC, etc)do  not significantly interfere with this assay.   However, presence of multiple variants may affect accuracy.     11/30/2017 7.9 (H) 4.0 - 5.6 % Final     Comment:     According to ADA guidelines, hemoglobin A1c <7.0%  represents  optimal control in non-pregnant diabetic patients. Different  metrics may apply to specific patient populations.   Standards of Medical Care in Diabetes-2016.  For the purpose of screening for the presence of diabetes:  <5.7%     Consistent with the absence of diabetes  5.7-6.4%  Consistent with increasing risk for diabetes   (prediabetes)  >or=6.5%  Consistent with diabetes  Currently, no consensus exists for use of hemoglobin A1c  for diagnosis of diabetes for children.  This Hemoglobin A1c assay has significant interference with fetal   hemoglobin   (HbF). The results are invalid for patients with abnormal amounts of   HbF,   including those with known Hereditary Persistence   of Fetal Hemoglobin. Heterozygous hemoglobin variants (HbAS, HbAC,   HbAD, HbAE, HbA2) do not significantly interfere with this assay;   however, presence of multiple variants in a sample may impact the %   interference.     02/22/2017 9.0 (H) 4.5 - 6.2 % Final     Comment:     According to ADA guidelines, hemoglobin A1C <7.0% represents  optimal control in non-pregnant diabetic patients.  Different  metrics may apply to specific populations.   Standards of Medical Care in Diabetes - 2016.  For the purpose of screening for the presence of diabetes:  <5.7%     Consistent with the absence of diabetes  5.7-6.4%  Consistent with increasing risk for diabetes   (prediabetes)  >or=6.5%  Consistent with diabetes  Currently no consensus exists for use of hemoglobin A1C  for diagnosis of diabetes for children.         Past Medical History: Patient has a past medical history of Abnormal finding on Pap smear, HPV DNA positive (2014), Anemia associated with chronic renal failure, Blood type B+, Bulging discs - symptomatic , CAD (coronary artery disease), Cardiomyopathy (3/13/2019), Diabetes mellitus, type 2, ESRD (end stage renal disease) (04/20/2004), FSGS (focal segmental glomerulosclerosis), Hyperlipidemia, Hypertension (2004),  Neuropathy, NSTEMI (non-ST elevated myocardial infarction) (3/29/2019), Obesity, Secondary hyperparathyroidism, renal, TIA (transient ischemic attack), and Uterine fibroid.    Past Surgical History: Patient has a past surgical history that includes Peritoneal catheter insertion; Umbilical hernia repair;  section, classic; Dialysis fistula creation; Cardiac catheterization; Incision and drainage of wound (Left, ); Open reduction and internal fixation (ORIF) of injury of hip (Left, 2018); Colonoscopy (N/A, 2018); Esophagogastroduodenoscopy (N/A, 3/13/2019); Esophagogastroduodenoscopy (N/A, 3/14/2019); Peritoneal catheter removal (N/A, 3/14/2019); Insertion of tunneled central venous hemodialysis catheter (N/A, 3/18/2019); Phlebography (Right, 3/20/2019); Placement of arteriovenous graft (Right, 3/22/2019); and Insertion of tunneled central venous hemodialysis catheter (N/A, 3/28/2019).    Social History: Patient reports that she has never smoked. She has never used smokeless tobacco. She reports that she does not drink alcohol or use drugs.    Family History: family history includes Cancer in her maternal grandmother and paternal grandfather; Diabetes in her maternal aunt and paternal aunt.    Medications: reviewed     Allergies: Patient is allergic to clindamycin; flagyl [metronidazole hcl]; and metronidazole.    ROS  Constitutional: Negative for chills, fatigue, fever.   HENT: Negative for sore throat, trouble swallowing.    Eyes: Negative for photophobia, visual disturbance.   Respiratory: Negative for cough, shortness of breath.    Cardiovascular: Negative for chest pain, palpitations, leg swelling.   Gastrointestinal: Negative for abdominal pain, constipation, diarrhea, nausea, vomiting.   Endocrine: Negative for cold intolerance, heat intolerance.   Genitourinary: Negative for dysuria, frequency.   Musculoskeletal: Negative for arthralgias, myalgias.   Skin: Negative for rash, wound,  erythema   Neurological: Negative for dizziness, syncope, weakness, light-headedness.   Psychiatric/Behavioral: Negative for confusion, hallucinations, anxiety  All other systems reviewed and are negative.    PEx  Temp:  [98.3 °F (36.8 °C)-99 °F (37.2 °C)]   Pulse:  []   Resp:  [18-20]   BP: (100-130)/(51-75)   SpO2:  [100 %]   Body mass index is 33.2 kg/m².   No intake or output data in the 24 hours ending 04/06/19 1806      General appearance: no distress, alert and oriented x 3  HEENT:  conjunctivae/corneas clear, PERRL, mucous membranes moist   Neck: supple, thyroid not enlarged  Pulm:   normal respiratory effort, CTAB, no wheezes, rales or rhonchi  Card: RRR, S1, S2 normal, no murmur, click, rub or gallop  Abd: soft, NT, ND, BS present; no masses, no organomegaly, obese  Ext: mild pedal edema b/l, L thigh with permacath in place c/d/i, NT  Pulses: 2+, symmetric  Skin: color, texture, turgor normal. No rashes or lesions  Neuro: CN II-XII grossly intact, no focal numbness or weakness, normal strength and tone   Psych: normal mood and affect    Recent Results (from the past 24 hour(s))   POCT glucose    Collection Time: 04/05/19  8:36 PM   Result Value Ref Range    POCT Glucose 153 (H) 70 - 110 mg/dL   CBC with Automated Differential    Collection Time: 04/06/19  6:13 AM   Result Value Ref Range    WBC 8.68 3.90 - 12.70 K/uL    RBC 2.87 (L) 4.00 - 5.40 M/uL    Hemoglobin 8.5 (L) 12.0 - 16.0 g/dL    Hematocrit 28.1 (L) 37.0 - 48.5 %    MCV 98 82 - 98 fL    MCH 29.6 27.0 - 31.0 pg    MCHC 30.2 (L) 32.0 - 36.0 g/dL    RDW 17.1 (H) 11.5 - 14.5 %    Platelets 375 (H) 150 - 350 K/uL    MPV 10.7 9.2 - 12.9 fL    Immature Granulocytes 0.3 0.0 - 0.5 %    Gran # (ANC) 4.5 1.8 - 7.7 K/uL    Immature Grans (Abs) 0.03 0.00 - 0.04 K/uL    Lymph # 3.3 1.0 - 4.8 K/uL    Mono # 0.7 0.3 - 1.0 K/uL    Eos # 0.2 0.0 - 0.5 K/uL    Baso # 0.04 0.00 - 0.20 K/uL    nRBC 0 0 /100 WBC    Gran% 51.5 38.0 - 73.0 %    Lymph% 37.9 18.0 -  48.0 %    Mono% 7.6 4.0 - 15.0 %    Eosinophil% 2.2 0.0 - 8.0 %    Basophil% 0.5 0.0 - 1.9 %    Differential Method Automated    Renal function panel    Collection Time: 04/06/19  6:13 AM   Result Value Ref Range    Glucose 97 70 - 110 mg/dL    Sodium 135 (L) 136 - 145 mmol/L    Potassium 5.2 (H) 3.5 - 5.1 mmol/L    Chloride 102 95 - 110 mmol/L    CO2 22 (L) 23 - 29 mmol/L    BUN, Bld 27 (H) 6 - 20 mg/dL    Calcium 9.4 8.7 - 10.5 mg/dL    Creatinine 8.6 (H) 0.5 - 1.4 mg/dL    Albumin 1.7 (L) 3.5 - 5.2 g/dL    Phosphorus 6.2 (H) 2.7 - 4.5 mg/dL    eGFR if African American 5.7 (A) >60 mL/min/1.73 m^2    eGFR if non African American 4.9 (A) >60 mL/min/1.73 m^2    Anion Gap 11 8 - 16 mmol/L   POCT glucose    Collection Time: 04/06/19  7:21 AM   Result Value Ref Range    POCT Glucose 112 (H) 70 - 110 mg/dL   CBC auto differential    Collection Time: 04/06/19  3:29 PM   Result Value Ref Range    WBC 7.76 3.90 - 12.70 K/uL    RBC 3.00 (L) 4.00 - 5.40 M/uL    Hemoglobin 9.0 (L) 12.0 - 16.0 g/dL    Hematocrit 29.5 (L) 37.0 - 48.5 %    MCV 98 82 - 98 fL    MCH 30.0 27.0 - 31.0 pg    MCHC 30.5 (L) 32.0 - 36.0 g/dL    RDW 17.0 (H) 11.5 - 14.5 %    Platelets 384 (H) 150 - 350 K/uL    MPV 10.7 9.2 - 12.9 fL    Immature Granulocytes 0.5 0.0 - 0.5 %    Gran # (ANC) 5.2 1.8 - 7.7 K/uL    Immature Grans (Abs) 0.04 0.00 - 0.04 K/uL    Lymph # 2.1 1.0 - 4.8 K/uL    Mono # 0.3 0.3 - 1.0 K/uL    Eos # 0.1 0.0 - 0.5 K/uL    Baso # 0.04 0.00 - 0.20 K/uL    nRBC 0 0 /100 WBC    Gran% 66.4 38.0 - 73.0 %    Lymph% 27.3 18.0 - 48.0 %    Mono% 4.0 4.0 - 15.0 %    Eosinophil% 1.3 0.0 - 8.0 %    Basophil% 0.5 0.0 - 1.9 %    Differential Method Automated    Basic metabolic panel    Collection Time: 04/06/19  3:29 PM   Result Value Ref Range    Sodium 137 136 - 145 mmol/L    Potassium 5.0 3.5 - 5.1 mmol/L    Chloride 102 95 - 110 mmol/L    CO2 21 (L) 23 - 29 mmol/L    Glucose 142 (H) 70 - 110 mg/dL    BUN, Bld 25 (H) 6 - 20 mg/dL    Creatinine 8.3  (H) 0.5 - 1.4 mg/dL    Calcium 9.4 8.7 - 10.5 mg/dL    Anion Gap 14 8 - 16 mmol/L    eGFR if African American 5.9 (A) >60 mL/min/1.73 m^2    eGFR if non  5.1 (A) >60 mL/min/1.73 m^2   Magnesium    Collection Time: 04/06/19  3:29 PM   Result Value Ref Range    Magnesium 1.6 1.6 - 2.6 mg/dL   Phosphorus    Collection Time: 04/06/19  3:29 PM   Result Value Ref Range    Phosphorus 5.2 (H) 2.7 - 4.5 mg/dL       Recent Labs   Lab 04/04/19  2048 04/05/19  0717 04/05/19  1109 04/05/19  1604 04/05/19  2036 04/06/19  0721   POCTGLUCOSE 162* 132* 152* 134* 153* 112*       Active Hospital Problems    Diagnosis  POA    *Complication of vascular access for dialysis [T82.9XXA]  Yes    FSGS (focal segmental glomerulosclerosis) biopsy proven with collapsing features [N05.1]  Yes    Anemia in chronic kidney disease, on chronic dialysis [N18.6, D63.1, Z99.2]  Not Applicable     on Epogen      Hyperlipidemia [E78.5]  Yes    Obesity [E66.9]  Yes    CAD (coronary artery disease) [I25.10]  Yes     Cath 12/27/2012:   RCA PCI 2.5 x 18 and 3.0 x 26 mm BMS   Patent LAD and LCX   Normal EF     Dr. Berrios for NSTEMI       DAPT score 3         Type 2 diabetes 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, with long-term current use of insulin [E11.22, N18.6, Z99.2, Z79.4]  Not Applicable    Renovascular hypertension [I15.0]  Yes    ESRD (end stage renal disease) on PD ( initiated dialysis 04/20/2004) [N18.6]  Yes     Nightly PD        Resolved Hospital Problems   No resolved problems to display.       IMAGING:   US LLE  FINDINGS:  Left thigh veins: The common femoral, femoral, popliteal, upper greater saphenous, and deep femoral veins are patent and free of thrombus. The veins are normally compressible and have normal phasic flow and augmentation response.    Left calf veins: The visualized calf veins are patent.    Contralateral CFV: The contralateral (right) common femoral vein is patent and free of  "thrombus.    Miscellaneous: Known peritoneal dialysis line is visualized in the left superficial femoral vein.      Impression       No evidence of deep venous thrombosis in the left lower extremity.         EKG: none performed      Assessment and Plan:  1.  Dialysis access issue  - consult nephro  - if unable to fix, will need vascular surgery eval     2.  ESRD on HD  - consult nephro  - diabetic diet, patient refuses renal diet  - attempting to get permacath working  - per notes from previous admission "RUQ graft is clotted per US; Surgery wish to fix it as an outpt.  Surgery does not think pt has good anatomy for vascular acces and wish for her to see Dr. Alonso for new PD placement in clinic"    3.  Recent NSTEMI  - continue ASA, statin  - stable  - denies CP    4.  CAD  - chronic and stable  - continue ASA, statin     5.  Chronic systolic heart failure  - chronic and stable    6.  DM2   - continue 10 units qhs basal insulin  - hold oral meds  - bedside glucose monitoring  - ISS  - diabetic diet    7.  Debility/weakness  - is currently at [facility]  - PT/OT eval    8. Obesity, BMI 33  - counseled on diet, exercise and weight loss      Diet: diabetic  DVT PPx: SCDs  GI prophylaxis: PPI  Code status: FULL    Dispo:  Consult Nephrology to assist with getting PermCath working, if unable to resolve, will need vascular surgery consult      Comfort [place] MD  Hospital Medicine Staff  [phone] pager    "

## 2019-04-06 NOTE — ED NOTES
Med team A paged regarding the overdue order for alteplase. Waiting to have this outstanding order addressed.

## 2019-04-06 NOTE — PLAN OF CARE
Problem: Adult Inpatient Plan of Care  Goal: Plan of Care Review  Outcome: Ongoing (interventions implemented as appropriate)     04/06/19 0523   Plan of Care Review   Plan of Care Reviewed With patient       Problem: Fall Injury Risk  Goal: Absence of Fall and Fall-Related Injury    Intervention: Promote Injury-Free Environment     04/06/19 0523   Optimize Slope and Functional Mobility   Environmental Safety Modification assistive device/personal items within reach   Optimize Balance and Safe Activity   Safety Promotion/Fall Prevention assistive device/personal item within reach;lighting adjusted;medications reviewed;instructed to call staff for mobility

## 2019-04-06 NOTE — NURSING
Called received from HD nurse. Nurse stated that they were not able to successfully provide treatment to Ms Todd d/t difficulty with her  femoral access. Pt is being sent to the [facility] ED for evaluation of catheter.

## 2019-04-06 NOTE — NURSING
Dr Wilkins was informed that Ms Todd was sent from dialysis center to [facility] ED for evaluation, because the dialysis center was unsuccessful with providing HD treatment d/t issues with femoral HD catheter. No new orders received at this time.

## 2019-04-06 NOTE — NURSING
Ms Todd was escorted off unit in stable condition by [facility]'s Wheelchair Van service for hemodialysis appointment.

## 2019-04-06 NOTE — PROGRESS NOTES
Patient refused to get up to be weighed this morning with the PCT , she stated she would get weighed when she wakes up, before she goes to Dialysis.   Will pass on in report.

## 2019-04-07 NOTE — PLAN OF CARE
[facility]     Department of Hospital Medicine     [address] [place] [address]     [phone] after hours  [phone] fax       NURSING HOME ORDERS    04/07/2019    Admit to Nursing Home: Skilled Bed      Diagnoses:  Active Hospital Problems    Diagnosis  POA    *Complication of vascular access for dialysis [T82.9XXA]  Yes    FSGS (focal segmental glomerulosclerosis) biopsy proven with collapsing features [N05.1]  Yes    Anemia in chronic kidney disease, on chronic dialysis [N18.6, D63.1, Z99.2]  Not Applicable     on Epogen      Hyperlipidemia [E78.5]  Yes    Obesity [E66.9]  Yes    CAD (coronary artery disease) [I25.10]  Yes     Cath 12/27/2012:   RCA PCI 2.5 x 18 and 3.0 x 26 mm BMS   Patent LAD and LCX   Normal EF     Dr. Berrios for NSTEMI       DAPT score 3         Type 2 diabetes 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, with long-term current use of insulin [E11.22, N18.6, Z99.2, Z79.4]  Not Applicable    Renovascular hypertension [I15.0]  Yes    ESRD (end stage renal disease) on PD ( initiated dialysis 04/20/2004) [N18.6]  Yes     Nightly PD        Resolved Hospital Problems   No resolved problems to display.       Patient is homebound due to:  Complication of vascular access for dialysis    Allergies:  Review of patient's allergies indicates:   Allergen Reactions    Clindamycin Diarrhea    Flagyl [metronidazole hcl]     Metronidazole        Vitals:      Every shift (Skilled Nursing patients)     Diet: Renal Diet              Supplement:  1 can every three times a day with meals                         Type:   Nepro      Acitivities:   - As tolerated    - Up in a chair each morning as tolerated   - Ambulate with assistance to bathroom   - Scheduled walks once each shift (every 8 hours)   - May ambulate independently   - May use walker, cane, or self-propelled wheelchair        LABS:  Per facility protocol, would check  renal function 3 times a week for one week     Nursing Precautions:    - Aspiration precautions:               - Total assistance with meals              -  Upright 90 degrees befor during and after meals               -  Suction at bedside          - Fall precautions per nursing home protocol   - Seizure precaution per [facility] protocol   - Decubitus precautions:              -  for positioning              - Pressure reducing foam mattress              - Turn patient every two hours. Use wedge pillows to anchor patient  - Patient is on only plavix monotherapy (no ASA 81) as per cardiology for recent NSTEMI     CONSULTS:               Physical Therapy to evaluate and treat                 Occupational Therapy to evaluate and treat     MISCELLANEOUS CARE:                 Routine Skin for Bedridden Patients:  Apply moisture barrier cream to all                          skin folds and wet areas in perineal area daily and after baths and                           all bowel movements.     WOUND CARE:     Abdominal wounds - every MWF pack with aquacel ag packing strip and cover with foam dressing.      Recommend continuing Triad barrier cream to stage 2 pressure injury to right buttocks BID/prn. Indurated area noted to left buttocks, appears to be a boil, approx 1cm x 0.5cm. Area does not appear to need an I&D at this time but if area continues to become larger, I&D may be necessary in future.                                                                                                                                                                                                     DIABETES CARE:       Check blood sugar:              Fingerstick blood sugar AC and HS              Fingerstick blood sugar every 6 hours if unable to eat                 Report CBG < 60 or > 400 to physician.                                           Insulin Sliding Scale                 Glucose                      Novolog  Insulin Subcutaneous               0 - 60                          Orange juice or glucose tablet, hold insulin                                       No insulin              201-250                       2 units              251-300                       4 units              301-350                       6 units              351-400                       8 units              >400                            10 units then call physician        Medications: Discontinue all previous medication orders, if any. See new list below.     Jenni Todd   Home Medication Instructions SHAWNA:[id number]    Printed on:04/07/19 7589   Medication Information                      Plavix 75 mg daily             atorvastatin (LIPITOR) 40 MG tablet  Take 40 mg by mouth every evening.              epoetin adonay 10,000 unit/mL Soln 1 mL, epoetin adonay 20,000 unit/mL Soln 1 mL  Inject 30,000 Units into the vein every 14 (fourteen) days.             gabapentin (NEURONTIN) 100 MG capsule  1 capsule 3 (three) times daily.             insulin detemir U-100 (LEVEMIR FLEXTOUCH) 100 unit/mL (3 mL) SubQ InPn pen  Inject 10 Units into the skin every evening.             nateglinide (STARLIX) 120 MG tablet  STARLIX 120MG 30 MINUTES BEFORE EACH MEAL.             ONETOUCH VERIO Strp  USE 1 STRIP ONCE DAILY IN VITRO             pantoprazole (PROTONIX) 40 MG tablet  Take 1 tablet (40 mg total) by mouth once daily.             sevelamer carbonate (RENVELA) 800 mg Tab  Take 1 tablet (800 mg total) by mouth 3 (three) times daily with meals.             vitamin renal formula, B-complex-vitamin c-folic acid, (B COMPLEX-C-FOLIC ACID) 1 mg Cap  Take 1 capsule by mouth once daily. 1 Capsule Oral Every day                       _________________________________  Guevara Metzger MD  04/07/2019

## 2019-04-07 NOTE — DISCHARGE SUMMARY
[facility]  Discharge Summary  Hospital Medicine    Patient Name: Jenni Todd  YOB: 1970    Admit Date: 4/6/2019    Discharge Date: 4/7/2019    Discharge Attending Physician: Guevara Metzger MD     Team: [facility]    Reason for Admission:   Chief Complaint   Patient presents with    Vascular Access Problem     Pt sent from dialysis-unable to complete treatment-states perma cath in left groin not working.   Pt was sent from rehab to dialysis-needs to go back to rehab.        Active Hospital Problems    Diagnosis  POA    *Complication of vascular access for dialysis [T82.9XXA]  Yes    FSGS (focal segmental glomerulosclerosis) biopsy proven with collapsing features [N05.1]  Yes    Anemia in chronic kidney disease, on chronic dialysis [N18.6, D63.1, Z99.2]  Not Applicable     on Epogen      Hyperlipidemia [E78.5]  Yes    Obesity [E66.9]  Yes    CAD (coronary artery disease) [I25.10]  Yes     Cath 12/27/2012:   RCA PCI 2.5 x 18 and 3.0 x 26 mm BMS   Patent LAD and LCX   Normal EF     Dr. Berrios for NSTEMI       DAPT score 3         Type 2 diabetes 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, with long-term current use of insulin [E11.22, N18.6, Z99.2, Z79.4]  Not Applicable    Renovascular hypertension [I15.0]  Yes    ESRD (end stage renal disease) on PD ( initiated dialysis 04/20/2004) [N18.6]  Yes     Nightly PD        Resolved Hospital Problems   No resolved problems to display.       HPI:   Jenni Todd is a 48 y.o. female w/ CAD s/p 2 PCI (2012), HFrEF 30%, HTN, DM2,  ESRD on home PD, recent NSTEMI and obesity who presents from [facility] with dialysis access issue.  Patient was recently discharged on April 4th with the dialysis access issues at that time as well.  She was discharged with a PermCath in her left femoral.  Patient notes that she was undergoing dialysis today and they could not get her were catheter to work.  She was given cath  flow in each port with no improvement.  She was sent to the hospital for further evaluation.  Patient states they did do about 15 min of dialysis.  She last had dialysis on Thursday.  Patient denies any current chest pain, shortness of breath, nausea, vomiting or diarrhea.  She has been tolerating a diet without any difficulty.  She notes she has been participating in therapy without any issues.        In the ED, patient underwent ultrasound of the left lower extremity which was negative for DVT.  ED physician consulted Nephrology to come assess the PermCath.  Admission was requested for dialysis access issue.    Hospital Course:   Admitted for observation. TPA instilled to both ports of left fem permcath, Both arterial and venous ports only slightly sluggish on aspiration and flush. Dialyzed. Her A/V ports are working well at this point. Stable for discharge back to [facility].      Principal Problem: Complication of vascular access for dialysis    Other Problems Addressed:  ESRD on HD  Recent NSTEMI, CAD  Recent peritonitis due to PD catheter  Recent gastric ulcer with GIB  Pressure injury stage 2 on left buttock   Chronic systolic heart failure  Diabetes mellitus, type 2  Debility  Generalized weakness    Procedures Performed: * No surgery found *    Special Care, Treatment, and Services Provided: None    Consults: Nephrology    Significant Diagnostic Studies:  No results found for: EF  Hemoglobin A1C   Date Value Ref Range Status   09/12/2018 6.3 (H) 4.0 - 5.6 % Final     Comment:     ADA Screening Guidelines:  5.7-6.4%  Consistent with prediabetes  >or=6.5%  Consistent with diabetes  High levels of fetal hemoglobin interfere with the HbA1C  assay. Heterozygous hemoglobin variants (HbS, HgC, etc)do  not significantly interfere with this assay.   However, presence of multiple variants may affect accuracy.     11/30/2017 7.9 (H) 4.0 - 5.6 % Final     Comment:     According to ADA guidelines, hemoglobin A1c <7.0%  "represents  optimal control in non-pregnant diabetic patients. Different  metrics may apply to specific patient populations.   Standards of Medical Care in Diabetes-2016.  For the purpose of screening for the presence of diabetes:  <5.7%     Consistent with the absence of diabetes  5.7-6.4%  Consistent with increasing risk for diabetes   (prediabetes)  >or=6.5%  Consistent with diabetes  Currently, no consensus exists for use of hemoglobin A1c  for diagnosis of diabetes for children.  This Hemoglobin A1c assay has significant interference with fetal   hemoglobin   (HbF). The results are invalid for patients with abnormal amounts of   HbF,   including those with known Hereditary Persistence   of Fetal Hemoglobin. Heterozygous hemoglobin variants (HbAS, HbAC,   HbAD, HbAE, HbA2) do not significantly interfere with this assay;   however, presence of multiple variants in a sample may impact the %   interference.       CBC:   Recent Labs   Lab 04/07/19  0346   WBC 8.47   RBC 2.98*   HGB 8.8*   HCT 28.7*      MCV 96   MCH 29.5   MCHC 30.7*     CMP:   Recent Labs   Lab 04/06/19  0613  04/07/19  0841   GLU 97   < > 114*   CALCIUM 9.4   < > 8.9   ALBUMIN 1.7*  --   --    *   < > 138   K 5.2*   < > 3.7   CO2 22*   < > 24      < > 101   BUN 27*   < > 14   CREATININE 8.6*   < > 5.1*    < > = values in this interval not displayed.         Final Diagnoses: Same as principal problem.    Discharged Condition: stable  Face to face services were provided on 4/7/2019   Time Spent:  I spent >30 minutes on the discharge, which included reviewing hospital course with patient/family, reviewing discharge medications, and arranging follow-up care.  Physical Exam on 4/7/2019:  /77 (BP Location: Left arm, Patient Position: Lying)   Pulse 98   Temp 98.5 °F (36.9 °C) (Oral)   Resp 18   Ht 5' 7.5" (1.715 m)   Wt 91.8 kg (202 lb 6.1 oz)   LMP 01/28/2014 (Approximate)   SpO2 100%   Breastfeeding? No   BMI 31.23 " kg/m²       General appearance: no distress, alert and oriented x 3  HEENT:  conjunctivae/corneas clear, PERRL, mucous membranes moist   Neck: supple, thyroid not enlarged  Pulm:   normal respiratory effort, CTAB, no wheezes, rales or rhonchi  Card: RRR, S1, S2 normal, no murmur, click, rub or gallop  Abd: soft, NT, ND, BS present; no masses, no organomegaly, obese  Ext: mild pedal edema b/l, L thigh with permacath in place c/d/i, NT  Pulses: 2+, symmetric  Skin: color, texture, turgor normal. No rashes or lesions  Neuro: no focal numbness or weakness, normal strength and tone   Psych: normal mood and affect      Disposition: Skilled Nursing Facility    Follow Up Instructions:     Future Appointments   Date Time Provider Department Center   4/12/2019  9:30 AM VASCULAR, LAB [facility] KYLIE Shearer [facility]   4/12/2019 10:30 AM BESSIE Wynn III, MD [facility] FER Shearer [facility]   4/29/2019  2:30 PM CARDIAC, PET IMAGING [facility] DAVID [facility]   5/6/2019 11:40 AM Calos Yousif MD [facility] CARDIO [facility]       Medications:   sodium chloride 0.9%   Intravenous Once    atorvastatin  40 mg Oral QHS    clopidogrel  75 mg Oral Daily    gabapentin  100 mg Oral TID    insulin detemir U-100  10 Units Subcutaneous QHS    pantoprazole  40 mg Oral Daily    sevelamer carbonate  800 mg Oral TID WM    vitamin renal formula (B-complex-vitamin c-folic acid)  1 capsule Oral Daily       Discharge Instructions:  As above    Guevara Metzger MD  Department of Hospital Medicine

## 2019-04-07 NOTE — PROGRESS NOTES
3-hr dialysis completed with net UF of 2189ml. System clotted 1hr 30mins of tx, restarted with new lines. Returned blood. Left femoral catheter flushed with saline and locked with heparin. Patient with no complaints.Report given to primary RN.

## 2019-04-07 NOTE — PLAN OF CARE
Problem: Adult Inpatient Plan of Care  Goal: Plan of Care Review  Outcome: Ongoing (interventions implemented as appropriate)  VS stable, had 1 bowel motion overnight on a bedside commode, prior to starting HD.  Blood sugar checked, insulin cover given. Safety maintained, bed at low and locked position, non slip socks on. Free of fall or injury overnight.

## 2019-04-07 NOTE — PLAN OF CARE
CM received update from Dr Metzger - pt's dialysis access is working and she can transfer back to [facility]. CM spoke w/ Ressie at [facility] and pt is ok to return. Pt's nurse can call report to p [phone] and ask for Jagdeep. CM setup a w/c van tranport via [facility] and requested pickup time is 11am. Pt's nurse is aware of d/c plan.     Patient's ride request has been placed thru the [facility] (patient flow center). Requested ride time is not guaranteed - pt's nurse, unit charge or unit secretary can follow up w/ [facility] [phone] (or [phone] and [phone]) to confirm ride time.    No

## 2019-04-07 NOTE — PROGRESS NOTES
TPA instilled to both ports of left fem permcath. Both arterial and venous ports only slightly sluggish on aspiration and flush.

## 2019-04-07 NOTE — NURSING
Report called to SNF nurse Jagdeep [phone]. MD in patient's room. Transport to be here within the hour.

## 2019-04-07 NOTE — PROGRESS NOTES
3-hour HD started at pt's bedside per Nephrology orders, post Cathflo dwell, via Lt femoral CVC with lines taped securely. Cathflo 2mg aspirated from both A/V ports with ease & flushes w/o difficulties. Pt stable, resting in bed, watching TV, NAD noted. Report received from Primary RN pre-treatment, with pre-assessment complete. Will monitor pt closely while on HD. See HD flowsheet for treatment details.

## 2019-04-08 NOTE — PT/OT/SLP PROGRESS
Occupational Therapy  Treatment    Jenni Todd   MRN: [id number]   Admitting Diagnosis: Peritonitis associated with peritoneal dialysis    OT Date of Treatment: 04/08/19       Billable Minutes:  Self Care/Home Management 43 and Therapeutic Activity 10    General Precautions: Standard, fall  Orthopedic Precautions: N/A  Braces: N/A         Subjective:  Communicated with nurse prior to session.      Pain/Comfort  Pain Rating 1: 0/10  Pain Rating Post-Intervention 1: 0/10    Objective:  Patient found with: (no lines)    Occupational Performance:    Bed Mobility:    · Pt seated in W/C at onset of therapy session.    Functional Mobility/Transfers:  · Patient completed Sit <> Stand Transfer with stand by assistance  with  rolling walker   · Patient completed Bed <> Chair Transfer using Stand Pivot technique with stand by assistance with rolling walker  · Functional Mobility: Pt ambulated from her room to therapy gym ambulating 50 ft 1 minute seated rest break, 65 ft 1 minute seated rest break and 84 ft with RW and SBA   ·  Pt propelled W/C from therapy gym to her room with (B) UEs with no rest breaks taken. Pt propelled a distance of 201 ft.      Activities of Daily Living:  · Grooming: modified independence seated to groom.  · Upper Body Dressing: supervision set up only  · Lower Body Dressing: minimum assistance donning shoes only. All other aspects needing set up only.         Pt was unwilling to attempt sock donning indicating that she wasn't wearing socks at home .    Pt worked on functional standing activity consisting of standing with RW while reaching in all planes , crossing of midline and reaching to varying heights to facilitate (B) wt shifting and stability in standing to increase (I)ce when performing self care, and functional activities with standing component.  Pt tolerated up to 1 min , 1 min 6 sec and 1 min 23 sec respectively in standing with RW  and (S) for safety.    Additional Treatment:  Pt edu  on Plan of care,  safety when performing functional transfers, self care tasks and functional standing activities.  - White board updated  - Self care tasks completed-- as noted above       Patient left up in chair with call button in reach and nurse notified    [facility] 6 Click:  [facility] Total Score: 22    ASSESSMENT:  Jenni Todd is a 48 y.o. female with a medical diagnosis of Peritonitis associated with peritoneal dialysis . Pt was agreeable to OT and tolerated Tx without incidence.   She continues to present with deficits affecting (I)ce with functional transfers, functional standing balance and self care tasks with standing component.  She is making progress but continues to require SBA and at times (A) to perform functional activities to completion.   OT continues to be recommended to further her functional (I)ce and safety. Goals remain appropriate and continued OT is recommended.        Rehab identified problem list/impairments: weakness, impaired endurance, impaired sensation, impaired self care skills, impaired functional mobilty, gait instability, impaired balance, decreased upper extremity function, decreased lower extremity function, edema    Rehab potential is good    Activity tolerance: Good    Discharge recommendations: home with home health     Barriers to discharge: Decreased caregiver support     Equipment recommendations: commode     GOALS:   Multidisciplinary Problems     Occupational Therapy Goals        Problem: Occupational Therapy Goal    Goal Priority Disciplines Outcome Interventions   Occupational Therapy Goal     OT, PT/OT Ongoing (interventions implemented as appropriate)    Description:  Goals to be met by: 4/17     Patient will increase functional independence with ADLs by performing:    UE Dressing with Modified [place].  - Met  LE Dressing with Supervision.  Grooming while standing at sink with Modified [place].  Toileting from bedside commode with Modified [place]  for hygiene and clothing management.   Bathing with Supervision.  Supine to sit with Modified [place]/c HOB flat and no handrails.  Stand pivot transfers with Modified [place].  Toilet transfer to bedside commode with Modified [place].  Upper extremity exercise program 3 x 10 reps per handout, with independence.  Patient will complete a functional standing activity for 10 min with S in order to perform self care tasks.                      Plan:  Patient to be seen 5 x/week to address the above listed problems via self-care/home management, therapeutic activities, therapeutic exercises, community/work re-entry  Plan of Care expires: 05/05/19  Plan of Care reviewed with: patient    Rashaun Johnson, OTR/SHERYL  04/08/2019

## 2019-04-08 NOTE — PT/OT/SLP PROGRESS
Physical Therapy  Treatment    Jenni Todd   MRN: [id number]   Admitting Diagnosis: Peritonitis associated with peritoneal dialysis    PT Received On: 04/08/19        Billable Minutes:  Gait Training 15, Therapeutic Activity 11, Therapeutic Exercise 15 and Total Time 41    Treatment Type: Treatment  PT/PTA: PT     PTA Visit Number: 0       General Precautions: Standard, fall  Orthopedic Precautions: N/A   Braces: N/A    Spiritual, Cultural Beliefs, [employer]ism Practices, Values that Affect Care: no    Subjective:  Communicated with patient prior to session.  Pt agreeable to session.    Pain/Comfort  Pain Rating 1: 8/10  Location - Side 1: Bilateral  Location - Orientation 1: generalized  Location 1: (hands and feet due to neuropathy)  Pain Addressed 1: Pre-medicate for activity    Objective:  Patient found sitting in w/c.       AM-PAC 6 CLICK MOBILITY  Total Score:20    Bed Mobility:  Not performed    Transfers:  Sit<>Stand: to/from w/c and rollator seat, multiple trials, w/ rollator and SBA  Stand Pivot Transfer: w/c<>nustep w/o AD w/ CGA/SBA for safety    Gait:  Amb 3 trials (40ft, 82ft, and 60ft) w/ rollator and CGA/SBA for safety  Limited in distance by fatigue  Swing through gait pattern and good posture noted     Additional Treatment:  Recumbent cross , Level 3, x15min to inc BUE/BLE strength and overall endurance  Brief breaks taken every 5minutes    Patient left up in chair with call button in reach.    Assessment:  Jenni Todd is a 48 y.o. female with a medical diagnosis of Peritonitis associated with peritoneal dialysis.  Pt senait session well w/ good participation. She is progressing well w/ mobility but remains limited by decreased endurance. She will continue PT POC.    Rehab identified problem list/impairments: weakness, impaired endurance, impaired self care skills, impaired sensation, impaired functional mobilty, gait instability, impaired balance, decreased lower extremity function,  edema(fall history- 2018)    Rehab potential is good.    Activity tolerance: Good    Discharge recommendations: home health PT     Barriers to discharge: Decreased caregiver support    Equipment recommendations: walker, rolling, commode     GOALS:   Multidisciplinary Problems     Physical Therapy Goals        Problem: Physical Therapy Goal    Goal Priority Disciplines Outcome Goal Variances Interventions   Physical Therapy Goal     PT, PT/OT Ongoing (interventions implemented as appropriate)     Description:  Goals to be met by: - 14 days    Patient will increase functional independence with mobility by performin. Supine to sit with Modified [place].  2*. Sit to supine with Modified [place].  3. Sit to stand transfer with Modified [place].  4. Bed to chair transfer with Modified [place] using Rolling Walker/rollator.  5. Gait  x 150 feet with Supervision using Rolling Walker/rollator.   6. Wheelchair propulsion x150 feet with Modified [place] using bilateral upper extremities.  7. Ascend/Descend 4 inch curb step with Stand-by Assistance using Rolling Walker/rollator.  8. Stand for 3 minutes with Supervision using unilateral upper extremity support while performing a dynamic task.  9. Lower extremity exercise program x 20 reps per handout, with independence.  10. Pt will improve her gait speed from 0.72 m/s to 0.85 m/s with use of rollator to demonstrate progress in her overall functional mobility and a lower fall risk.                     PLAN:    Patient to be seen 5 x/week  to address the above listed problems via gait training, therapeutic activities, therapeutic exercises, neuromuscular re-education, wheelchair management/training  Plan of Care expires: 19  Plan of Care reviewed with: patient    Brook BROWN Andrzej, PT  2019

## 2019-04-08 NOTE — PROGRESS NOTES
[facility]                                  Skilled Nursing Facility                   Progress Note     Admit Date: 4/4/2019  JESSY 4/16/2019TBD   Principal Problem:  Peritonitis associated with peritoneal dialysis   HPI obtained from patient interview and chart review     Chief Complaint: Establish Care/ Initial Visit/evaluation of medical treatment and therapy status: Lab review    HPI:   Ms Todd is a 48 year old female w/ CAD s/p 2 PCI(2012), HFrEF 30%, HTN, DM, and ESRD who was on home PD who presents to  following a hospitalization for peritonitis. She was originally admitted to [facility] on 2/21 for peritonitis. PD catheter removed. Due to hx of recurrent peritonitis she was transitioned to HD. Pt currently with a temporary dialysis catheter in her left fem. Nephrology access team attempted a tunneled line and were unsuccessful. Vascular surgery consulted for HD access. Hospital course complicated by GIB with low risk ulcer. S/p OR 3/22 per vascular for RUE graft ([place]sen) which is clotted. 3/28 groin perm-cath placed. NSTEMI 3/29. Pt refuses renal diet; prefers diabetic. No IVs in R arm. RUQ graft is clotted per US; Surgery wish to fix it as an outpt. Surgery does not think pt has good anatomy for vascular acces and wishes for her to see Dr. Alonso for new PD placement in clinic. Pt may f/u in vascular clinic on 4/12, contacting Dr. Leger to confirm. All labs reviewed, H&H stable at 7.6/25.9, will trend tomorrows labs. Bun/Cr at 23/6.8, gets HD TTS. Patient progessing well with PT/OT. Patient medically stable. Continuing to follow and treat all acute and chronic conditions.    Patient will be treated at [facility] with PT and OT to improve functional status and ability to perform ADLs.     Past Medical History: Patient has a past medical history of Abnormal finding on Pap smear, HPV DNA positive (2014), Anemia  associated with chronic renal failure, Blood type B+, Bulging discs - symptomatic , CAD (coronary artery disease), Cardiomyopathy (3/13/2019), Diabetes mellitus, type 2, ESRD (end stage renal disease) (2004), FSGS (focal segmental glomerulosclerosis), Hyperlipidemia, Hypertension (), Neuropathy, NSTEMI (non-ST elevated myocardial infarction) (3/29/2019), Obesity, Secondary hyperparathyroidism, renal, TIA (transient ischemic attack), and Uterine fibroid.    Past Surgical History: Patient has a past surgical history that includes Peritoneal catheter insertion; Umbilical hernia repair;  section, classic; Dialysis fistula creation; Cardiac catheterization; Incision and drainage of wound (Left, ); Open reduction and internal fixation (ORIF) of injury of hip (Left, 2018); Colonoscopy (N/A, 2018); Esophagogastroduodenoscopy (N/A, 3/13/2019); Esophagogastroduodenoscopy (N/A, 3/14/2019); Peritoneal catheter removal (N/A, 3/14/2019); Insertion of tunneled central venous hemodialysis catheter (N/A, 3/18/2019); Phlebography (Right, 3/20/2019); Placement of arteriovenous graft (Right, 3/22/2019); and Insertion of tunneled central venous hemodialysis catheter (N/A, 3/28/2019).    Social History: Patient reports that she has never smoked. She has never used smokeless tobacco. She reports that she does not drink alcohol or use drugs.    Family History: family history includes Cancer in her maternal grandmother and paternal grandfather; Diabetes in her maternal aunt and paternal aunt.    Allergies: Patient is allergic to clindamycin; flagyl [metronidazole hcl]; and metronidazole.    ROS  Constitutional: Negative for fever and malaise/fatigue.   Eyes: Negative for blurred vision, double vision and discharge.   Respiratory: Negative for cough, shortness of breath and wheezing.    Cardiovascular: Negative for chest pain, palpitations, claudication, leg swelling and PND.   Gastrointestinal: Negative for  "abdominal pain, constipation, diarrhea, nausea and vomiting.   Genitourinary: Negative for dysuria, frequency and urgency.   Musculoskeletal:  + generalized weakness. Negative for back pain and myalgias.   Skin: Negative for itching and rash.   Neurological: Negative for dizziness, speech change, seizures, and headaches.   Psychiatric/Behavioral: Negative for depression. The patient is not nervous/anxious.      PEx  Temp:  [96.4 °F (35.8 °C)-98.2 °F (36.8 °C)]   Pulse:  [100-106]   Resp:  [18-20]   BP: ()/(48-72)   SpO2:  [97 %-100 %]      Constitutional: Patient appears well-developed and well-nourished.   HENT:   Head: Normocephalic and atraumatic.   Eyes: Pupils are equal, round, and reactive to light.   Neck: Normal range of motion. Neck supple.   Cardiovascular: Normal rate, regular rhythm and normal heart sounds.    Pulmonary/Chest: Effort normal and breath sounds are clear  Abdominal: Soft. Bowel sounds are normal.   Musculoskeletal: Normal range of motion. +weakness  Neurological: Alert and oriented to person, place, and time.   Skin: Skin is warm and dry. RUE graft without thrill or bruit  Psychiatric: Normal mood and affect. Behavior is normal.     Assessment and Plan:  NSTEMI (non-ST elevated myocardial infarction)  - 3/29 Tn 0.2>1.0  after a tachy followed by hypotensive episode the day after her groin perm-cath was placed. cards consulted 3/29 "recommend continuing medical therapy with Plavix 75 mg QD and Atorvastatin 40 mg QD; would avoid ASA given recent GIB .    - per cards "Please ensure she follows up with her Cardiologist, Dr Calos Gavin on discharge to set up a stress test."     Peritonitis due to peritoneal dialysis catheter   - Finished course of cefepime as per ID  - Wound care consulted for PD site wound care  - 4/8 Called Dr. Leger's nurse to confirm need for appt on 4/12    - 4/8 Bun/Cr at 23/6.8, gets HD TTS.      CAD  - remote stents.   - Was on DAPT but held due to GIB 3/13 and " need for procedures here. Only plavix restarted given recent GIB per cards recs 3/29.    - holding BB due to recent low BP     Acute gastric ulcer with hemorrhage  GIB  - no more bleeding, on PPI  - biopsy neg for malignancy or H.pylori  - 4/8 H&H stable at 7.6/25.9, will trend CBC in the am     Pressure injury stage 2 on left buttock   - consulted wound care     Chronic systolic heart failure  - EF 30% on TTE, stable  - Hold antihypertensive due to low BP, resume as able, can be done OP     Diabetes mellitus, type 2  - A1c 6.3, was getting intra-peritoneal insulin. Catheter now removed.   - detemir 10 units nightly      Debility  Generalized weakness  Debility   - Continue with PT/OT for gait training and strengthening and restoration of ADL's   - Encourage mobility, OOB in chair, and early ambulation as appropriate  - Fall precautions   - Monitor for bowel and bladder dysfunction  - Monitor for and prevent skin breakdown and pressure ulcers  - Continue DVT prophylaxis with teds/scds given recent GIB    Diabetic neuropathy  - initiated home gabapentin 800 mg t.i.d.     F/U- Cardiologist - Dr Calos Gavin on discharge to set up a stress test. And Dr. Alonso for new PD placement in clinic    I certify that SNF services are required to be given on an inpatient basis because Jenni Todd needs for skilled nursing care and/or skilled rehabilitation are required on a daily basis and such services can only practically be provided in a skilled nursing facility setting and are for an ongoing condition for which she received inpatient care in the hospital.     Time (minutes) spent in the care of the patient (Greater than 1/2 spent in non direct face-to-face contact) 68 mins.   36 of 68minutes spent on documentation and counseling patient on clinical condition and therapies provided regarding peritonitis treatment plan. The remainder of the time was spent in direct patient care.     Cleveland Monterroso NP

## 2019-04-08 NOTE — PLAN OF CARE
Problem: Adult Inpatient Plan of Care  Goal: Plan of Care Review  Outcome: Ongoing (interventions implemented as appropriate)  BLOOD GLUCOSES MONITORED.FALL PRECAUTIONS MAINTAINED NO INJURIES NOTED.AFEBRILE.

## 2019-04-08 NOTE — PLAN OF CARE
Problem: Adult Inpatient Plan of Care  Goal: Plan of Care Review  Outcome: Ongoing (interventions implemented as appropriate)     04/08/19 0608   Plan of Care Review   Plan of Care Reviewed With patient       Problem: Fall Injury Risk  Goal: Absence of Fall and Fall-Related Injury    Intervention: Promote Injury-Free Environment     04/08/19 0608   Optimize [place] and Functional Mobility   Environmental Safety Modification assistive device/personal items within reach   Optimize Balance and Safe Activity   Safety Promotion/Fall Prevention assistive device/personal item within reach;lighting adjusted;medications reviewed;instructed to call staff for mobility

## 2019-04-08 NOTE — PLAN OF CARE
Problem: Occupational Therapy Goal  Goal: Occupational Therapy Goal  Goals to be met by: 4/17     Patient will increase functional independence with ADLs by performing:    UE Dressing with Modified [place].  - Met  LE Dressing with Supervision.  Grooming while standing at sink with Modified [place].  Toileting from bedside commode with Modified [place] for hygiene and clothing management.   Bathing with Supervision.  Supine to sit with Modified [place]/c HOB flat and no handrails.  Stand pivot transfers with Modified [place].  Toilet transfer to bedside commode with Modified [place].  Upper extremity exercise program 3 x 10 reps per handout, with independence.  Patient will complete a functional standing activity for 10 min with S in order to perform self care tasks.    Outcome: Ongoing (interventions implemented as appropriate)  Rashaun Johnson OTR/L      4/8/2019

## 2019-04-08 NOTE — PLAN OF CARE
Problem: Physical Therapy Goal  Goal: Physical Therapy Goal  Goals to be met by: -  days    Patient will increase functional independence with mobility by performin. Supine to sit with Modified [place].  2*. Sit to supine with Modified [place].  3. Sit to stand transfer with Modified [place].  4. Bed to chair transfer with Modified [place] using Rolling Walker/rollator.  5. Gait  x 150 feet with Supervision using Rolling Walker/rollator.   6. Wheelchair propulsion x150 feet with Modified [place] using bilateral upper extremities.  7. Ascend/Descend 4 inch curb step with Stand-by Assistance using Rolling Walker/rollator.  8. Stand for 3 minutes with Supervision using unilateral upper extremity support while performing a dynamic task.  9. Lower extremity exercise program x 20 reps per handout, with independence.  10. Pt will improve her gait speed from 0.72 m/s to 0.85 m/s with use of rollator to demonstrate progress in her overall functional mobility and a lower fall risk.    Outcome: Ongoing (interventions implemented as appropriate)  LTGs remain appropriate. Pt will continue PT POC.    Brook Donnelly, PT  2019

## 2019-04-08 NOTE — PROGRESS NOTES
Consult on patient again. She has been increased to 2800 diabetic diet. Patient requests a sack lunch with breakfast on dialysis days, so she won't miss a meal. Pt still refuses ONS but is eating % of meals, patient encouraged to order double portions if she so chooses. Reported PO4 level of 4.9 to patient so she would be sure to use her binders.

## 2019-04-09 NOTE — PROGRESS NOTES
Received call from yasmine from core lab with glucose f results of 47.pt. Blood sugar this am was 82.will continue to monitor.

## 2019-04-09 NOTE — PROGRESS NOTES
Pt. Returned to facility per 2 Help via w/c.pt. voices no complaints at this time.will continue to monitor.

## 2019-04-09 NOTE — PROGRESS NOTES
[facility]                                  Skilled Nursing Facility                   Progress Note     Admit Date: 2019  JESSY 2019TBD   Principal Problem:  Peritonitis associated with peritoneal dialysis   HPI obtained from patient interview and chart review     Chief Complaint: Revaluation of medical treatment and therapy status: Lab review    HPI:   All labs reviewed. H&H at 7.2/23.7, Bun/Cr at 35/7.8, and Phos at 5.2, Dialysis TTS. Blood glucose at 47, med adjusted. Patient progessing well with PT/OT. Patient medically stable. Continuing to follow and treat all acute and chronic conditions.    Past Medical History: Patient has a past medical history of Abnormal finding on Pap smear, HPV DNA positive (), Anemia associated with chronic renal failure, Blood type B+, Bulging discs - symptomatic , CAD (coronary artery disease), Cardiomyopathy (3/13/2019), Diabetes mellitus, type 2, ESRD (end stage renal disease) (2004), FSGS (focal segmental glomerulosclerosis), Hyperlipidemia, Hypertension (), Neuropathy, NSTEMI (non-ST elevated myocardial infarction) (3/29/2019), Obesity, Secondary hyperparathyroidism, renal, TIA (transient ischemic attack), and Uterine fibroid.    Past Surgical History: Patient has a past surgical history that includes Peritoneal catheter insertion; Umbilical hernia repair;  section, classic; Dialysis fistula creation; Cardiac catheterization; Incision and drainage of wound (Left, ); Open reduction and internal fixation (ORIF) of injury of hip (Left, 2018); Colonoscopy (N/A, 2018); Esophagogastroduodenoscopy (N/A, 3/13/2019); Esophagogastroduodenoscopy (N/A, 3/14/2019); Peritoneal catheter removal (N/A, 3/14/2019); Insertion of tunneled central venous hemodialysis catheter (N/A, 3/18/2019); Phlebography (Right, 3/20/2019); Placement of arteriovenous graft (Right, 3/22/2019); and  Insertion of tunneled central venous hemodialysis catheter (N/A, 3/28/2019).    Social History: Patient reports that she has never smoked. She has never used smokeless tobacco. She reports that she does not drink alcohol or use drugs.    Family History: family history includes Cancer in her maternal grandmother and paternal grandfather; Diabetes in her maternal aunt and paternal aunt.    Allergies: Patient is allergic to clindamycin; flagyl [metronidazole hcl]; and metronidazole.    ROS  Constitutional: Negative for fever and malaise/fatigue.   Eyes: Negative for blurred vision, double vision and discharge.   Respiratory: Negative for cough, shortness of breath and wheezing.    Cardiovascular: Negative for chest pain, palpitations, claudication, leg swelling and PND.   Gastrointestinal: Negative for abdominal pain, constipation, diarrhea, nausea and vomiting.   Genitourinary: Negative for dysuria, frequency and urgency.   Musculoskeletal:  + generalized weakness. Negative for back pain and myalgias.   Skin: Negative for itching and rash.   Neurological: Negative for dizziness, speech change, seizures, and headaches.   Psychiatric/Behavioral: Negative for depression. The patient is not nervous/anxious.      PEx  Temp:  [98.3 °F (36.8 °C)-99.9 °F (37.7 °C)]   Pulse:  [107-118]   Resp:  [18-20]   BP: (90-98)/(50-62)   SpO2:  [100 %]      Constitutional: Patient appears well-developed and well-nourished.   HENT:   Head: Normocephalic and atraumatic.   Eyes: Pupils are equal, round, and reactive to light.   Neck: Normal range of motion. Neck supple.   Cardiovascular: Normal rate, regular rhythm and normal heart sounds.    Pulmonary/Chest: Effort normal and breath sounds are clear  Abdominal: Soft. Bowel sounds are normal.   Musculoskeletal: Normal range of motion. + weakness  Neurological: Alert and oriented to person, place, and time.   Skin: Skin is warm and dry. RUE graft without thrill or bruit  Psychiatric: Normal  "mood and affect. Behavior is normal.     Assessment and Plan:  Peritonitis due to peritoneal dialysis catheter   - Finished course of cefepime as per ID  - Wound care consulted for PD site wound care  - 4/8 Called Dr. Leger's nurse to confirm need for appt on 4/12    - 4/8 Bun/Cr at 23/6.8, gets HD TTS.   - 4/9 H&H at 7.2/23.7(likely dilutional), Bun/Cr at 35/7.8, and Phos at 5.2, Dialysis today, continue to monitor weekly    Diabetes mellitus, type 2  - A1c 6.3, was getting intra-peritoneal insulin. Catheter now removed.   - detemir 10 units nightly   - Glucose at 47 on am labs, [place] discontinued    CONTINUED    NSTEMI (non-ST elevated myocardial infarction)  - 3/29 Tn 0.2>1.0  after a tachy followed by hypotensive episode the day after her groin perm-cath was placed. cards consulted 3/29 "recommend continuing medical therapy with Plavix 75 mg QD and Atorvastatin 40 mg QD; would avoid ASA given recent GIB .    - per cards "Please ensure she follows up with her Cardiologist, Dr Calos Gavin on discharge to set up a stress test."     CAD  - remote stents.   - Was on DAPT but held due to GIB 3/13 and need for procedures here. Only plavix restarted given recent GIB per cards recs 3/29.    - holding BB due to recent low BP     Acute gastric ulcer with hemorrhage  GIB  - no more bleeding, on PPI  - biopsy neg for malignancy or H.pylori  - 4/8 H&H stable at 7.6/25.9, will trend CBC in the am     Pressure injury stage 2 on left buttock   - consulted wound care     Chronic systolic heart failure  - EF 30% on TTE, stable  - Hold antihypertensive due to low BP, resume as able, can be done OP     Debility  Generalized weakness  Debility   - Continue with PT/OT for gait training and strengthening and restoration of ADL's   - Encourage mobility, OOB in chair, and early ambulation as appropriate  - Fall precautions   - Monitor for bowel and bladder dysfunction  - Monitor for and prevent skin breakdown and pressure " ulcers  - Continue DVT prophylaxis with teds/scds given recent GIB    Diabetic neuropathy  - initiated home gabapentin 800 mg t.i.d.     F/U- Cardiologist - Dr Calos Gavin on discharge to set up a stress test. And Dr. Alonso for new PD placement in clinic    Cleveland Monterroso NP

## 2019-04-09 NOTE — CLINICAL REVIEW
Clinical Pharmacy Chart Review Note      Admit Date: 4/4/2019   LOS: 5 days       Jenni Todd is a 48 y.o. female admitted to [facility] for PT/OT after hospitalization for peritonitis associated with peritoneal dialysis.    Active Hospital Problems    Diagnosis  POA    *Peritonitis associated with peritoneal dialysis [T85.71XA]  Yes    Complication of vascular access for dialysis [T82.9XXA]  Yes    Pressure injury of left buttock, stage 2 [L89.322]  Yes    Delayed surgical wound healing [T81.89XA]  Yes      Resolved Hospital Problems   No resolved problems to display.     Review of patient's allergies indicates:   Allergen Reactions    Clindamycin Diarrhea    Flagyl [metronidazole hcl]     Metronidazole      Patient Active Problem List    Diagnosis Date Noted    Complication of vascular access for dialysis 04/06/2019    NSTEMI (non-ST elevated myocardial infarction) 03/29/2019    Other specified anemias 03/26/2019    Pressure injury of left buttock, stage 2 03/19/2019    Delayed surgical wound healing 03/19/2019    Chronic systolic heart failure 03/18/2019    Acute gastric ulcer with hemorrhage 03/18/2019    Antibiotic-associated diarrhea 03/18/2019    Hemodialysis catheter dysfunction 03/18/2019    Cardiomyopathy 03/13/2019    Nausea and vomiting     Hypotension due to hypovolemia     Occult GI bleeding 03/01/2019    Abdominal pain     Peritonitis associated with peritoneal dialysis 02/21/2019    Peritonitis due to infected peritoneal dialysis catheter 02/21/2019    Gastrointestinal hemorrhage with hematemesis 09/20/2018    ESRD (end stage renal disease) on dialysis 09/12/2018    PAD (peripheral artery disease) 10/24/2016    Gait abnormality 12/23/2015    Chronic low back pain 12/23/2015    DDD (degenerative disc disease), lumbar 12/23/2015    Cerebral infarction due to embolism of middle cerebral artery 08/13/2015    Hypoalbuminemia 07/14/2015    Hypertension associated with  diabetes 07/09/2015    Hypertensive renal disease 12/10/2014    Spinal stenosis of sacral and sacrococcygeal region 07/23/2014    Bulging discs - symptomatic  05/16/2014    Awaiting organ transplant status 02/19/2013    Neuropathy     FSGS (focal segmental glomerulosclerosis) biopsy proven with collapsing features     Anemia in chronic kidney disease, on chronic dialysis     Renovascular hypertension     Hyperlipidemia     Obesity     CAD (coronary artery disease)     Type 2 diabetes 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, with long-term current use of insulin     ESRD (end stage renal disease) on PD ( initiated dialysis 04/20/2004) 04/20/2004       Scheduled Meds:    atorvastatin  40 mg Oral QHS    clopidogrel  75 mg Oral Daily    epoetin adonay (PROCRIT) injection  100 Units/kg Intravenous Every Tues, Thurs, Sat    gabapentin  100 mg Oral TID    insulin detemir U-100  10 Units Subcutaneous QHS    pantoprazole  40 mg Oral Daily    senna-docusate 8.6-50 mg  1 tablet Oral BID    sevelamer carbonate  800 mg Oral TID WM    vitamin renal formula (B-complex-vitamin c-folic acid)  1 capsule Oral Daily     Continuous Infusions:   PRN Meds: sodium chloride 0.9%, sodium chloride 0.9%, acetaminophen, acetaminophen, calcium carbonate, dextrose 50%, dextrose 50%, glucagon (human recombinant), glucose, glucose, heparin (porcine), heparin (porcine), heparin (porcine), insulin aspart U-100, loperamide, ondansetron, ondansetron, polyethylene glycol, promethazine, ramelteon    OBJECTIVE:     Vital Signs (Last 24H)  Temp:  [98.3 °F (36.8 °C)-99.9 °F (37.7 °C)]   Pulse:  [107-118]   Resp:  [18-20]   BP: (90-98)/(60-62)   SpO2:  [100 %]     Laboratory:  CBC:   Recent Labs   Lab 04/07/19  0346 04/08/19  0522 04/09/19  0552   WBC 8.47 10.14  10.05  10.05 11.47   RBC 2.98* 2.48*  2.54*  2.54* 2.35*   HGB 8.8* 7.5*  7.6*  7.6* 7.2*   HCT 28.7* 25.0*  25.9*  25.9* 23.7*    327  331  331  313   MCV 96 101*  102*  102* 101*   MCH 29.5 30.2  29.9  29.9 30.6   MCHC 30.7* 30.0*  29.3*  29.3* 30.4*     BMP:   Recent Labs   Lab 04/06/19  1529 04/07/19 0841 04/08/19 0522 04/09/19  0552   * 114* 145*  148*  148* 47*  47*    138 138  137  137 138  136   K 5.0 3.7 4.2  4.2  4.2 4.7  4.7    101 102  102  102 104  102   CO2 21* 24 24  25  25 24  23   BUN 25* 14 23*  23*  23* 35*  35*   CREATININE 8.3* 5.1* 6.8*  6.7*  6.7* 7.8*  8.3*   CALCIUM 9.4 8.9 9.2  9.1  9.1 9.3  9.2   MG 1.6  --  1.6  1.6 1.5*     CMP:   Recent Labs   Lab 04/06/19 0613 04/07/19 0841 04/08/19 0522 04/09/19  0552   GLU 97   < > 114* 145*  148*  148* 47*  47*   CALCIUM 9.4   < > 8.9 9.2  9.1  9.1 9.3  9.2   ALBUMIN 1.7*  --   --  1.7* 1.7*   *   < > 138 138  137  137 138  136   K 5.2*   < > 3.7 4.2  4.2  4.2 4.7  4.7   CO2 22*   < > 24 24  25  25 24  23      < > 101 102  102  102 104  102   BUN 27*   < > 14 23*  23*  23* 35*  35*   CREATININE 8.6*   < > 5.1* 6.8*  6.7*  6.7* 7.8*  8.3*    < > = values in this interval not displayed.     LFTs:   Recent Labs   Lab 04/06/19 0613 04/08/19 0522 04/09/19  0552   ALBUMIN 1.7* 1.7* 1.7*     Lab Results   Component Value Date    HGBA1C 6.3 (H) 09/12/2018     Lab Results   Component Value Date    CHOL 199 02/24/2017    CHOL 124 07/06/2015    CHOL 245 (H) 05/07/2014     Lab Results   Component Value Date    HDL 19 (L) 02/24/2017    HDL 25 (L) 07/06/2015    HDL 25 (L) 05/07/2014     Lab Results   Component Value Date    LDLCALC Invalid, Trig>400.0 02/24/2017    LDLCALC 75.6 07/06/2015    LDLCALC Invalid, Trig>400.0 05/07/2014     Lab Results   Component Value Date    TRIG 460 (H) 02/24/2017    TRIG 117 07/06/2015    TRIG 557 (H) 05/07/2014     Lab Results   Component Value Date    CHOLHDL 9.5 (L) 02/24/2017    CHOLHDL 20.2 07/06/2015    CHOLHDL 10.2 (L) 05/07/2014     Lab Results   Component Value Date    ALT  8 (L) 03/31/2019    AST 12 03/31/2019    ALKPHOS 114 03/31/2019    BILITOT 0.2 03/31/2019     Lab Results   Component Value Date    CALCIUM 9.2 04/09/2019    CALCIUM 9.3 04/09/2019    PHOS 5.3 (H) 04/09/2019    PHOS 5.2 (H) 04/09/2019         ASSESSMENT/PLAN:     I have reviewed the medications in compliance with CMS Regulation F329 of the CASA Appendix PP. Based on information gathered, the following items may need to be addressed:    **Recommend decreasing dose of gabapentin to 200 mg nightly, renal dosing.    Medications will be reviewed and monitored by Pharm.D.          Dewayne Malagon D.  Clinical Pharmacist  [facility]

## 2019-04-09 NOTE — PT/OT/SLP PROGRESS
Physical Therapy      Patient Name:  Jenni Todd   MRN:  [id number]    Patient not seen today secondary to inc fatigue dialysis  . Will follow-up next PT session    Sailaja Gallegos PTA

## 2019-04-09 NOTE — PT/OT/SLP PROGRESS
Occupational Therapy  Treatment    Jenni Todd   MRN: [id number]   Admitting Diagnosis: Peritonitis associated with peritoneal dialysis    OT Date of Treatment: 04/09/19       Billable Minutes:  Self Care/Home Management 35, and Therapeutic Activity 10     General Precautions: Standard, fall  Orthopedic Precautions: N/A  Braces: N/A         Subjective:  Communicated with nurse prior to session.      Pain/Comfort  Pain Rating 1: 0/10  Pain Rating Post-Intervention 1: 0/10    Objective:  Patient found with: (no lines)    Occupational Performance:    Bed Mobility:    · Patient completed Rolling/Turning to Right with modified independence  · Patient completed Scooting/Bridging with modified independence  · Patient completed Supine to Sit with modified independence     Functional Mobility/Transfers:  · Patient completed Sit <> Stand Transfer with supervision  with  rolling walker   · Patient completed Bed <> Chair Transfer using Stand Pivot technique with supervision with rolling walker  · Patient completed Toilet Transfer Stand Pivot technique with supervision with  rolling walker  · Functional Mobility: Pt ambulated in room with rollator to retrieve clothing from closet a distance of 8 ft to and 8 ft from closet with (S) .   · Pt performed functional ambulation from her room toward therapy gym using rollator with (S) a distance of 121ft        Before taking a rest break.     Activities of Daily Living:  · Grooming: modified independence seated.  · Bathing: supervision with set up provided.  · Upper Body Dressing: (S) assist needed but (S) when retrieving clothing.  · Lower Body Dressing: supervision when standing to manage pant/ panties over hips. Pt dnning pants, panties, and slip on shoes..  · Toileting: supervision when standing to clean and to manage clothing over bottom..    Additional Treatment:  Pt edu on Plan of care,  safety when performing functional transfers, self care tasks and functional standing  activities.  - White board updated  - Self care tasks completed-- as noted above   -  She performed dynamic sitting activity while sitting unsupported EOB and from W/C working on self care tasks.    Patient left up in chair with call button in reach and nurse notified    [facility] 6 Click:  [facility] Total Score: 22    ASSESSMENT:  Jenni Todd is a 48 y.o. female with a medical diagnosis of Peritonitis associated with peritoneal dialysis . Pt was agreeable to OT and tolerated Tx without incidence.   She continues to present with deficits affecting (I)ce with functional transfers, functional standing balance and self care tasks with standing component. She is making progress but continues to require SBA and at times (A) to perform functional activities to completion.   OT continues to be recommended to further her functional (I)ce and safety. Goals remain appropriate and continued OT is recommended.    Rehab identified problem list/impairments: weakness, impaired endurance, impaired sensation, impaired self care skills, impaired functional mobilty, gait instability, impaired balance, decreased upper extremity function, decreased lower extremity function, edema    Rehab potential is good    Activity tolerance: Good    Discharge recommendations: home with home health     Barriers to discharge: Decreased caregiver support     Equipment recommendations: commode     GOALS:   Multidisciplinary Problems     Occupational Therapy Goals        Problem: Occupational Therapy Goal    Goal Priority Disciplines Outcome Interventions   Occupational Therapy Goal     OT, PT/OT Ongoing (interventions implemented as appropriate)    Description:  Goals to be met by: 4/17     Patient will increase functional independence with ADLs by performing:    UE Dressing with Modified [place].  - Met  LE Dressing with Supervision.  Ongoing  Grooming while standing at sink with Modified [place].  Toileting from bedside commode with Modified  [place] for hygiene and clothing management.   Bathing with Supervision.   Ongoing  Supine to sit with Modified [place]/c HOB flat and no handrails.  - Met  Stand pivot transfers with Modified [place].  Toilet transfer to bedside commode with Modified [place].  Upper extremity exercise program 3 x 10 reps per handout, with independence.  Patient will complete a functional standing activity for 10 min with S in order to perform self care tasks.                       Plan:  Patient to be seen 5 x/week to address the above listed problems via self-care/home management, therapeutic activities, therapeutic exercises, community/work re-entry  Plan of Care expires: 05/05/19  Plan of Care reviewed with: patient    Rashaun Johnson, OTR/SHERYL  04/09/2019

## 2019-04-09 NOTE — PLAN OF CARE
Problem: Occupational Therapy Goal  Goal: Occupational Therapy Goal  Goals to be met by: 4/17     Patient will increase functional independence with ADLs by performing:    UE Dressing with Modified [place].  - Met  LE Dressing with Supervision.  Ongoing  Grooming while standing at sink with Modified [place].  Toileting from bedside commode with Modified [place] for hygiene and clothing management.   Bathing with Supervision.   Ongoing  Supine to sit with Modified [place]/c HOB flat and no handrails.  - Met  Stand pivot transfers with Modified [place].  Toilet transfer to bedside commode with Modified [place].  Upper extremity exercise program 3 x 10 reps per handout, with independence.  Patient will complete a functional standing activity for 10 min with S in order to perform self care tasks.     Outcome: Ongoing (interventions implemented as appropriate)  Rashaun Johnson, OTR/L      4/9/2019

## 2019-04-10 NOTE — PT/OT/SLP PROGRESS
Physical Therapy  Treatment    Jenni Todd   MRN: [id number]   Admitting Diagnosis: Peritonitis associated with peritoneal dialysis    PT Received On: 04/10/19          Billable Minutes:  Gait Training 15, Therapeutic Activity 23 and Therapeutic Exercise 15=53    Treatment Type: Treatment  PT/PTA: PT     PTA Visit Number: 0       General Precautions: Standard, fall  Orthopedic Precautions: N/A   Braces: N/A    Spiritual, Cultural Beliefs, Catholic Practices, Values that Affect Care: no    Subjective:  Communicated with nurse prior to session.  Agreeable to PT services.    Pain/Comfort  Pain Rating 1: 0/10    Objective:  Patient found seated in WC.       AM-PAC 6 CLICK MOBILITY  Total Score:22    Bed Mobility:  Sit>Supine:(I)  Supine>Sit: (I) goal met    Transfers:  Sit<>Stand: Mod I  Stand Pivot Transfer: Mod I with rollator    Gait:  Amb 95 feet with rollator, supervision, swing-through gait and proper posture.  38 feet with rollator, supervision, swing-through gait pattern.    10 meter (6 meter) Walk Test:   Patient Instructions: Pt is instructed to walk a set distance (6 meters or 10 meters). Distance covered is divided by the time it took to walk that distance. Calculate Average of 3 trials. Assistive device can be used but must be kept consistent. If patient requires assistance to walk this test is not appropriate.    Gait Speed = 0.79 m/s with rolling walker assistive device at preferred speed    Interpretation:   Small meaningful change=.05 m/s  Substantial meaningful change=.13 m/s    < 0.4 m/s were more likely to be household ambulators  0.4 - 0.8 m/s limited community ambulators  > 0.8 m/s were community ambulators         Advanced Gait:  Curb Step: 4 inch curb step, SBA with rollator    Therex:  Standing: Hip flexion, hip abduction, heel raises x 10 reps with UE support  -Heel raises were difficult due to pain and tightness for pt.  Stretched passively pt's gastrocs (bilaterally) x 2 trials (20  second hold each)- tightness noted.      Balance:  Can stand with UE support and needs frequent rest breaks- supervision-SBA.    Additional Treatment:  Completed 2 minutes on recumbent stepper at level 10 to improve U/LE strength and endurance.       Patient left up in chair with call button in reach.    Assessment:  Jenni Todd is a 48 y.o. female with a medical diagnosis of Peritonitis associated with peritoneal dialysis.  Ms. Todd met three goals today and is progressing. She has shown improvement in her gait speed, but it is not substantially different. Will benefit from continued PT services focusing on her balance and endurance.    Rehab identified problem list/impairments: weakness, impaired endurance, impaired self care skills, impaired sensation, impaired functional mobilty, gait instability, impaired balance, decreased lower extremity function, edema(fall history- 2018)    Rehab potential is good.    Activity tolerance: Good    Discharge recommendations: home health PT     Barriers to discharge: Decreased caregiver support    Equipment recommendations: walker, rolling, commode     GOALS:   Multidisciplinary Problems     Physical Therapy Goals        Problem: Physical Therapy Goal    Goal Priority Disciplines Outcome Goal Variances Interventions   Physical Therapy Goal     PT, PT/OT Ongoing (interventions implemented as appropriate)     Description:  Goals to be met by: - 14 days    Patient will increase functional independence with mobility by performin. Supine to sit with Modified [place].  2*. Sit to supine with Modified [place].  3. Sit to stand transfer with Modified [place]. Met (4/10/2019)  4. Bed to chair transfer with Modified [place] using Rolling Walker/rollator. Met (4/10/2019)  5. Gait  x 150 feet with Supervision using Rolling Walker/rollator.   6. Wheelchair propulsion x150 feet with Modified [place] using bilateral upper extremities.  7.  Ascend/Descend 4 inch curb step with Stand-by Assistance using Rolling Walker/rollator. Met (4/10/2019)  8. Stand for 3 minutes with Supervision using unilateral upper extremity support while performing a dynamic task.  9. Lower extremity exercise program x 20 reps per handout, with independence.  10. Pt will improve her gait speed from 0.72 m/s to 0.85 m/s with use of rollator to demonstrate progress in her overall functional mobility and a lower fall risk.                      PLAN:    Patient to be seen 5 x/week  to address the above listed problems via gait training, therapeutic activities, therapeutic exercises, neuromuscular re-education, wheelchair management/training  Plan of Care expires: 05/05/19  Plan of Care reviewed with: patient    Nicole Cabezas, PT  04/10/2019

## 2019-04-10 NOTE — PLAN OF CARE
Problem: Physical Therapy Goal  Goal: Physical Therapy Goal  Goals to be met by: -  days    Patient will increase functional independence with mobility by performin. Supine to sit with Modified [place].  2*. Sit to supine with Modified [place].  3. Sit to stand transfer with Modified [place]. Met (4/10/2019)  4. Bed to chair transfer with Modified [place] using Rolling Walker/rollator. Met (4/10/2019)  5. Gait  x 150 feet with Supervision using Rolling Walker/rollator.   6. Wheelchair propulsion x150 feet with Modified [place] using bilateral upper extremities.  7. Ascend/Descend 4 inch curb step with Stand-by Assistance using Rolling Walker/rollator. Met (4/10/2019)  8. Stand for 3 minutes with Supervision using unilateral upper extremity support while performing a dynamic task.  9. Lower extremity exercise program x 20 reps per handout, with independence.  10. Pt will improve her gait speed from 0.72 m/s to 0.85 m/s with use of rollator to demonstrate progress in her overall functional mobility and a lower fall risk.    Outcome: Ongoing (interventions implemented as appropriate)  Met three goals.

## 2019-04-10 NOTE — PLAN OF CARE
Problem: Occupational Therapy Goal  Goal: Occupational Therapy Goal  Goals to be met by: 4/17     Patient will increase functional independence with ADLs by performing:    UE Dressing with Modified [place].  - Met  LE Dressing with Supervision.  Ongoing  Grooming while standing at sink with Modified [place].  Toileting from bedside commode with Modified [place] for hygiene and clothing management. - Met  Bathing with Supervision.   Ongoing  Supine to sit with Modified [place]/c HOB flat and no handrails.  - Met  Stand pivot transfers with Modified [place].  Toilet transfer to bedside commode with Modified [place].  Upper extremity exercise program 3 x 10 reps per handout, with independence.  Patient will complete a functional standing activity for 10 min with S in order to perform self care tasks.      Outcome: Ongoing (interventions implemented as appropriate)  Rashaun Johnson, JENNIFERR/SHERYL      4/10/2019

## 2019-04-10 NOTE — PT/OT/SLP PROGRESS
Occupational Therapy  Treatment    Jenni Todd   MRN: [id number]   Admitting Diagnosis: Peritonitis associated with peritoneal dialysis    OT Date of Treatment: 04/10/19       Billable Minutes:  Self Care/Home Management 20, Therapeutic Activity 10 and Therapeutic Exercise 15    General Precautions: Standard, fall  Orthopedic Precautions: N/A  Braces: N/A         Subjective:  Communicated with nurse prior to session.      Pain/Comfort  Pain Rating 1: 0/10  Pain Rating Post-Intervention 1: 0/10    Objective:  Patient found with: (Dialysis Cath)    Occupational Performance:    Bed Mobility:    · Patient completed Rolling/Turning to Right with modified independence  · Patient completed Scooting/Bridging with supervision  · Patient completed Supine to Sit with stand by assistance     Functional Mobility/Transfers:  · Patient completed Sit <> Stand Transfer with modified independence  with  4 wheeled walker   · Patient completed Bed <> Chair Transfer using Stand Pivot technique with supervision with 4 wheeled walker  · Patient completed Toilet Transfer Stand Pivot technique with supervision with  4 wheeled walker  · Functional Mobility: Pt ambulated from her room toward therapy gym with rollator a distance of 123 ft with (S) only         making it to the nurses station. . She propelled her W/C from therapy gym to her room 187ft.    Activities of Daily Living:  · Grooming: modified independence seated to groom.  · Lower Body Dressing: supervision performing LBD.  · Toileting: modified independence toileting on [facility] with rollator to stand and manage clothing.    [facility] 6 Click:  [facility] Total Score: 22    OT Exercises: UE Ergometer performed 15 minutes on UBE with min resistance. UE exercises performed to increase  functional endurance and strength.  Strengthening required in order increase independence when performing self care   tasks, functional ambulation, W/C propulsion , functional standing activities as well as when  performing functional tasks.      Patient left up in chair with all lines intact, call button in reach and nurse notified    ASSESSMENT:  Jenni Todd is a 48 y.o. female with a medical diagnosis of Peritonitis associated with peritoneal dialysis . Pt was agreeable to OT and tolerated Tx without incidence.   She continues to present with deficits affecting (I)ce with functional transfers, functional standing balance and self care tasks with standing component.  She is making progress but continues to require SBA and at times (A) to perform functional activities to completion.   OT continues to be recommended to further her functional (I)ce and safety. Goals remain appropriate and continued OT is recommended.        Rehab identified problem list/impairments: weakness, impaired endurance, impaired sensation, impaired self care skills, impaired functional mobilty, gait instability, impaired balance, decreased upper extremity function, decreased lower extremity function, edema    Rehab potential is good    Activity tolerance: Good    Discharge recommendations: home with home health     Barriers to discharge: Decreased caregiver support     Equipment recommendations: commode     GOALS:   Multidisciplinary Problems     Occupational Therapy Goals        Problem: Occupational Therapy Goal    Goal Priority Disciplines Outcome Interventions   Occupational Therapy Goal     OT, PT/OT Ongoing (interventions implemented as appropriate)    Description:  Goals to be met by: 4/17     Patient will increase functional independence with ADLs by performing:    UE Dressing with Modified [place].  - Met  LE Dressing with Supervision.  Ongoing  Grooming while standing at sink with Modified [place].  Toileting from bedside commode with Modified [place] for hygiene and clothing management. - Met  Bathing with Supervision.   Ongoing  Supine to sit with Modified [place]/c HOB flat and no handrails.  - Met  Stand pivot  transfers with Modified [place].  Toilet transfer to bedside commode with Modified [place].  Upper extremity exercise program 3 x 10 reps per handout, with independence.  Patient will complete a functional standing activity for 10 min with S in order to perform self care tasks.                        Plan:  Patient to be seen 5 x/week to address the above listed problems via self-care/home management, therapeutic activities, therapeutic exercises, community/work re-entry  Plan of Care expires: 05/05/19  Plan of Care reviewed with: patient    Rashaun Johnson OTR/SHERYL  04/10/2019

## 2019-04-10 NOTE — PROGRESS NOTES
Wound care follow up.     Wounds to the abdomen appear to be improving. No odor or purulence noted. Wounds cleansed and repacked with aquacel ag rope.     Buttocks wound is nearly  Healed. Patient states it is no longer tender. Triad applied    Wound care to follow PRN.   Cammy Ferrara RN [facility]   [phone]             04/10/19 1622        Incision/Site 03/19/19 Right Abdomen   Date First Assessed: 03/19/19   Side: Right  Location: Abdomen   Incision WDL ex   Dressing Appearance Clean;Intact   Drainage Amount Small   Drainage Characteristics/Odor Serosanguineous   Appearance Pink;Fibrin   Red (%), Wound Tissue Color 90 %   Yellow (%), Wound Tissue Color 10 %   Periwound Area Scar tissue   Wound Edges Open   Wound Length (cm) 0.6 cm   Wound Width (cm) 1 cm   Wound Depth (cm) 2 cm   Wound Volume (cm^3) 1.2 cm^3   Wound Surface Area (cm^2) 0.6 cm^2   Care Cleansed with:;Sterile normal saline   Dressing Removed;Applied;Changed;Silver;Is[place]/border   Dressing Change Due 04/12/19        Incision/Site 03/19/19 Abdomen lower   Date First Assessed: 03/19/19   Location: Abdomen  Orientation: lower   Wound Image    Incision WDL ex   Dressing Appearance Intact   Drainage Amount Small   Drainage Characteristics/Odor Serosanguineous   Appearance Pink;Fibrin   Red (%), Wound Tissue Color 50 %   Yellow (%), Wound Tissue Color 50 %   Periwound Area Scar tissue   Wound Edges Open   Wound Length (cm) 0.4 cm   Wound Width (cm) 2.5 cm   Wound Depth (cm) 4 cm   Wound Volume (cm^3) 4 cm^3   Wound Surface Area (cm^2) 1 cm^2   Care Cleansed with:;Sterile normal saline   Dressing Removed;Applied;Changed;Silver;[place]/border   Dressing Change Due 04/12/19        Incision/Site 03/14/19 1445 Left Abdomen   Date First Assessed/Time First Assessed: 03/14/19 1445   Side: Left  Location: Abdomen   Incision WDL ex   Dressing Appearance Intact   Drainage Amount Small   Drainage Characteristics/Odor Serosanguineous   Appearance Pink;Fibrin   Red (%),  Wound Tissue Color 50 %   Yellow (%), Wound Tissue Color 50 %   Periwound Area Scar tissue   Wound Edges Open   Wound Length (cm) 0.8 cm   Wound Width (cm) 2 cm   Wound Depth (cm) 2 cm   Wound Volume (cm^3) 3.2 cm^3   Wound Surface Area (cm^2) 1.6 cm^2   Care Cleansed with:;Wound cleanser   Dressing Removed;Applied;Changed;Silver;[place]/border   Dressing Change Due 04/12/19        Pressure Injury 03/27/19 Right Buttocks Stage 2   Date First Assessed: 03/27/19   Pressure Injury Present on Admission: suspected hospital acquired  Side: Right  Location: Buttocks  Staging: Stage 2   Wound Image    Staging Stage 2   Dressing Appearance Open to air   Drainage Amount None   Appearance Pink   Tissue loss description Partial thickness   Red (%), Wound Tissue Color 100 %   Periwound Area Dry   Wound Length (cm) 0.2 cm   Wound Width (cm) 0.2 cm   Wound Depth (cm) 0.1 cm   Wound Volume (cm^3) 0 cm^3   Wound Surface Area (cm^2) 0.04 cm^2   Care Skin Barrier   Dressing Change Due 04/10/19

## 2019-04-10 NOTE — PHYSICIAN QUERY
"PT Name: Jenni Todd  MR #: [id number]    Physician Query Form - Consultant Diagnosis Clarification     CDS/: Sahara Frederick RN              Contact information: [phone]  This form is a permanent document in the medical record.     Query Date: April 10, 2019      By submitting this query, we are merely seeking further clarification of documentation.  Please utilize your independent clinical judgment when addressing the question(s) below.      The Medical record contains the following:   Diagnosis Supporting Clinical Information Location in Medical Record   NSTEMI  -NSTEMI, likely due to type 2 demand ischemia in the setting of sepsis/infection, hypotensive episode and impaired clearance of troponin due to ESRD with history of underlying  CAD   Elevated troponin      -serial EKG's reviewed initially sinus tachycardia and later NSR    -TTE from OSH reported LVEF 30%, bedside limited echo performed by me showed improvement in LVEF, no isolated WMA   -serial troponin estela from 0.2 to 0.9-1 range    -recommend continuing medical therapy with Plavix 75 mg QD and Atorvastatin 40 mg QD; would avoid ASA given recent GIB    -unable to initiate GDMT for HFrEF given soft BP, will attempt to resume home beta-blocker, ACE-I/ARB and potassium sparing agent when able and ischemic evaluation at future time point    -continue to monitor on telemetry    -obtain 2D echo with CFD in the AM     NSTEMI (non-ST elevated myocardial infarction)    3/29 Tn 0.2>1.0  after a tachy followed by hypotensive episode the day after her groin perm-cath was placed. cards consulted 3/29 "recommend continuing medical therapy with Plavix  75 mg QD and Atorvastatin 40 mg QD; would avoid ASA given recent GIB .  -unable to initiate GDMT for HFrEF given soft BP, will attempt to resume home beta-blocker, ACE-I/ARB and potassium sparing agent when able and ischemic evaluation at future time  point"         Troponin- 0.281, 0.953, 1.091, " 0.436      Vent. Rate : 146 BPM     Atrial Rate : 146 BPM     P-R Int : 174 ms          QRS Dur : 096 ms      QT Int : 272 ms       P-R-T Axes : 075 086 221 degrees     QTc Int : 423 ms  Sinus tachycardia  Possible Left atrial enlargement  Possible  Anterior infarct        Vent. Rate : 095 BPM     Atrial Rate : 095 BPM     P-R Int : 186 ms          QRS Dur : 086 ms      QT Int : 372 ms       P-R-T Axes : 048 037 150 degrees     QTc Int : 467 ms  Normal sinus rhythm  Cannot rule out Anterior infarct (cited on or before 13-MAR-2019)  ST and T wave abnormality, consider lateral ischemia  Q wave and ST elevation in a single lead  Abnormal ECG        Vent. Rate : 096 BPM     Atrial Rate : 096 BPM     P-R Int : 178 ms          QRS Dur : 090 ms      QT Int : 382 ms       P-R-T Axes : 062 095 147 degrees     QTc Int : 482 ms     Age and gender specific analysis  Normal sinus rhythm  Rightward axis  ST elevation consider inferior injury or acute infarct  Prolonged QT  ** ** ACUTE MI / STEMI ** **  Consider right ventricular involvement in acute inferior infarct  Abnormal ECG   3/30 Cardiology consult                                  4/3 Hosp med note                  3/29- 3/31 Lab        3/29 EKG                  3/30 EKG                      3/30 EKG         Do you agree with the Consultants diagnosis of NSTEMI ?    [  ] Yes   [ X ] Yes, but it resolved prior to my assessment of the patient   [  ] No   [  ] Clinically insignificant   [  ] Other/Clarification of findings:   [  ] Clinically undetermined

## 2019-04-11 NOTE — PLAN OF CARE
Problem: Occupational Therapy Goal  Goal: Occupational Therapy Goal  Goals to be met by: 4/17     Patient will increase functional independence with ADLs by performing:    UE Dressing with Modified [place].  - Met  LE Dressing with Supervision.  Ongoing  Grooming while standing at sink with Modified [place].  Toileting from bedside commode with Modified [place] for hygiene and clothing management. - Met  Bathing with Supervision.   Ongoing  Supine to sit with Modified [place]/c HOB flat and no handrails.  - Met  Stand pivot transfers with Modified [place].  Toilet transfer to bedside commode with Modified [place].  Upper extremity exercise program 3 x 10 reps per handout, with independence.  Patient will complete a functional standing activity for 10 min with S in order to perform self care tasks.       Outcome: Ongoing (interventions implemented as appropriate)  Rashaun Johnson, JENNIFERR/SHERYL      4/11/2019

## 2019-04-11 NOTE — PROGRESS NOTES
Pt. Returned to facility from dialysis per 2 Heilp via w/c.pt. Voices no complaints at present time.will continue to monitor.

## 2019-04-11 NOTE — PT/OT/SLP PROGRESS
Physical Therapy      Patient Name:  Jenni Todd   MRN:  [id number]    Patient not seen today secondary to pt refusal (polite refusal) x2 attempts 2* to fatigue due to being away at dialysis majority of the day. Will follow-up at next scheduled visit per PT POC.    Amelia Wilson, PTA

## 2019-04-11 NOTE — PT/OT/SLP PROGRESS
Occupational Therapy  Treatment    Jenni Todd   MRN: [id number]   Admitting Diagnosis: Peritonitis associated with peritoneal dialysis    OT Date of Treatment: 04/11/19       Billable Minutes:  Self Care/Home Management 38 and Therapeutic Exercise 15    General Precautions: Standard, fall  Orthopedic Precautions: N/A  Braces: N/A         Subjective:  Communicated with nurse prior to session.      Pain/Comfort  Pain Rating 1: 0/10  Pain Rating Post-Intervention 1: 0/10    Objective:  Patient found with: (Dialysis Cath)    Occupational Performance:    Bed Mobility:    · Patient completed Rolling/Turning to Left with  modified independence  · Patient completed Scooting/Bridging with supervision  · Patient completed Supine to Sit with supervision     Functional Mobility/Transfers:  · Patient completed Sit <> Stand Transfer with supervision  with  4 wheeled walker   · Patient completed Bed <> Chair Transfer using Stand Pivot technique with supervision with 4 wheeled walker  · Patient completed Toilet Transfer Stand Pivot technique with supervision with  4 wheeled walker      Activities of Daily Living:  · Grooming: modified independence seated  · Bathing: supervision when standing to clean bottom.  · Upper Body Dressing: modified independence no (A) required to don/doff pullover shirt.  · Lower Body Dressing: supervision donning pants, brief and slip on shoes.  · Toileting: modified independence from raised toilet with RW to stand.      OT Exercises: UE Ergometer performed 15 minutes on UBE with mod resistance. UE exercises performed to increase functional endurance and strength.  Strengthening required in order increase independence when performing self care tasks, functional ambulation, W/C propulsion , functional standing activities as well as when performing functional tasks.      Additional Treatment:  Pt edu on Plan of care,  safety when performing functional transfers, self care tasks and functional standing  activities.  - White board updated  - Self care tasks completed-- as noted above     Patient left up in chair with call button in reach and nurse notified    [facility] 6 Click:  [facility] Total Score: 22    ASSESSMENT:  Jenni Todd is a 48 y.o. female with a medical diagnosis of Peritonitis associated with peritoneal dialysis . Pt was agreeable to OT and tolerated Tx without incidence.   She continues to present with deficits affecting (I)ce with functional transfers, functional standing balance and self care tasks with standing component but is making progress toward meeting her goals. Pt's functional endurance is slowly improving.     OT continues to be recommended to further her functional (I)ce and safety. Goals remain appropriate and continued OT is recommended.        Rehab identified problem list/impairments: weakness, impaired endurance, impaired sensation, impaired self care skills, impaired functional mobilty, gait instability, impaired balance, decreased upper extremity function, decreased lower extremity function, edema    Rehab potential is good    Activity tolerance: Good    Discharge recommendations: home with home health     Barriers to discharge: Decreased caregiver support     Equipment recommendations: commode     GOALS:   Multidisciplinary Problems     Occupational Therapy Goals        Problem: Occupational Therapy Goal    Goal Priority Disciplines Outcome Interventions   Occupational Therapy Goal     OT, PT/OT Ongoing (interventions implemented as appropriate)    Description:  Goals to be met by: 4/17     Patient will increase functional independence with ADLs by performing:    UE Dressing with Modified [place].  - Met  LE Dressing with Supervision.  Ongoing  Grooming while standing at sink with Modified [place].  Toileting from bedside commode with Modified [place] for hygiene and clothing management. - Met  Bathing with Supervision.   Ongoing  Supine to sit with Modified  [place]/c HOB flat and no handrails.  - Met  Stand pivot transfers with Modified [place].  Toilet transfer to bedside commode with Modified [place].  Upper extremity exercise program 3 x 10 reps per handout, with independence.  Patient will complete a functional standing activity for 10 min with S in order to perform self care tasks.                        Plan:  Patient to be seen 5 x/week to address the above listed problems via self-care/home management, therapeutic activities, therapeutic exercises, community/work re-entry  Plan of Care expires: 05/05/19  Plan of Care reviewed with: patient    Rashaun Johnson, OTR/L  04/11/2019

## 2019-04-11 NOTE — PLAN OF CARE
Problem: Adult Inpatient Plan of Care  Goal: Plan of Care Review  Outcome: Ongoing (interventions implemented as appropriate)     04/11/19 0509   Plan of Care Review   Plan of Care Reviewed With patient       Problem: Fall Injury Risk  Goal: Absence of Fall and Fall-Related Injury    Intervention: Promote Injury-Free Environment     04/11/19 0509   Optimize [place] and Functional Mobility   Environmental Safety Modification assistive device/personal items within reach   Optimize Balance and Safe Activity   Safety Promotion/Fall Prevention Fall Risk reviewed with patient/family;lighting adjusted;medications reviewed;instructed to call staff for mobility

## 2019-04-12 PROBLEM — A41.9 SEPTIC SHOCK: Status: ACTIVE | Noted: 2019-01-01

## 2019-04-12 PROBLEM — Z78.9 PROBLEM WITH VASCULAR ACCESS: Status: ACTIVE | Noted: 2019-01-01

## 2019-04-12 PROBLEM — R65.21 SEPTIC SHOCK: Status: ACTIVE | Noted: 2019-01-01

## 2019-04-12 NOTE — PROGRESS NOTES
"Discharge Summary  SNF    Admit Date: 4/04/2019    Discharge Date: 4/12/2019    LOS: 8    Principle Diagnosis: Septic shock    Secondary Diagnoses:   Active Hospital Problems    Diagnosis  POA    *Septic shock [A41.9, R65.21]  Yes    Problem with vascular access [Z78.9]  Unknown    ESRD (end stage renal disease) on dialysis [N18.6, Z99.2]  Not Applicable    CAD (coronary artery disease) [I25.10]  Yes     Cath 12/27/2012:   RCA PCI 2.5 x 18 and 3.0 x 26 mm BMS   Patent LAD and LCX   Normal EF     Dr. Berrios for NSTEMI       DAPT score 3         Anemia in chronic kidney disease, on chronic dialysis [N18.6, D63.1, Z99.2]  Not Applicable     on Epogen                   HPI:  On 4/11/2019 the patient to dialysis in the am and return to SNF facility at 310pm with no complaints. Pt reported fatigue 2nd to dialysis treatment and politely declined PT. At 750 pm the patient was complaining of tightness in her chest pain 4/10, generalized weakness, and stated "I don't feel well", VS: BP 82/52, T 101.1F, and . Charge nurse notified of abnormal vitals. On call provider paged. @ 2015: New orders given for CXR, labs, and EKG. @ 2325  Patient becoming increasingly restless, diaphoretic, and "feel short of breath". . On call provider notified of current situation. New orders to send pt to ED for evaluation.  @ 2340: Report called to ED. @ 2345: EMS arrived to unit for patient transfer. @ 0015: Pt off unit via stretcher per [facility] to transport to ED.  Multiple unsuccessful attempts were made to obtain BPs and lab draws on patient. Charge nurse & on call provider aware.  Patient offically discharged to the ED      Significant Laboratory Data:  HbA1C:   Lab Results   Component Value Date    HGBA1C 6.1 (H) 04/12/2019     TSH:   Lab Results   Component Value Date    TSH 7.269 (H) 04/12/2019     Lipids:   Lab Results   Component Value Date    CHOL 199 02/24/2017    CHOL 124 07/06/2015    CHOL 245 (H) 05/07/2014     Lab " Results   Component Value Date    HDL 19 (L) 02/24/2017     Lab Results   Component Value Date    LDLCALC Invalid, Trig>400.0 02/24/2017     Lab Results   Component Value Date    TRIG 460 (H) 02/24/2017     Lab Results   Component Value Date    CHOLHDL 9.5 (L) 02/24/2017       Consultations:  IP CONSULT TO CRITICAL CARE MEDICINE  IP CONSULT TO NEPHROLOGY  IP CONSULT TO CARDIOLOGY    Discharge Medications:   Jenni Todd   Home Medication Instructions SHAWNA:[id number]    Printed on:04/12/19 1257   Medication Information                      amLODIPine (NORVASC) 10 MG tablet  Take 10 mg by mouth once daily.             aspirin (ECOTRIN) 81 MG EC tablet  Take 1 tablet (81 mg total) by mouth once daily.             atorvastatin (LIPITOR) 40 MG tablet  Take 40 mg by mouth every evening.              calcitRIOL (ROCALTROL) 0.25 MCG Cap  Take 0.5 mcg by mouth once daily.              cinacalcet (SENSIPAR) 90 MG Tab  Take 90 mg by mouth once daily.             epoetin adonay 10,000 unit/mL Soln 1 mL, epoetin adonay 20,000 unit/mL Soln 1 mL  Inject 30,000 Units into the vein every 14 (fourteen) days.             gabapentin (NEURONTIN) 100 MG capsule  1 capsule 3 (three) times daily.             insulin detemir U-100 (LEVEMIR FLEXTOUCH) 100 unit/mL (3 mL) SubQ InPn pen  Inject 10 Units into the skin every evening.             nateglinide (STARLIX) 120 MG tablet  STARLIX 120MG 30 MINUTES BEFORE EACH MEAL.             ONETOUCH VERIO Strp  USE 1 STRIP ONCE DAILY IN VITRO             pantoprazole (PROTONIX) 40 MG tablet  Take 1 tablet (40 mg total) by mouth once daily.             sevelamer carbonate (RENVELA) 800 mg Tab  Take 1 tablet (800 mg total) by mouth 3 (three) times daily with meals.             vitamin renal formula, B-complex-vitamin c-folic acid, (B COMPLEX-C-FOLIC ACID) 1 mg Cap  Take 1 capsule by mouth once daily. 1 Capsule Oral Every day               Diet: NPO    Level of Activity: As tolerated.    Follow up  Plan:  Discharged to ED    Discharge Disposition:  Patient was discharged to ED in unstable condition.    This discharge took 40 minutes to complete.

## 2019-04-12 NOTE — ASSESSMENT & PLAN NOTE
- Patient with low Hgb and currently septic with hypotension and elevated lactate.   - Extensive cardiac history, EKG with ischemic ST depression in lateral leads, high troponin and pressure chest pain yesterday.  - This may represent type 1 vs type 2 MI.     Recommendations:    - Treat as NSTEMI, load with aspirin, continue aspirin and plavix daily.  - Heparin gtt if ok with primary team.   - Consider blood transfusion.   - Will need ischemic evaluation when more stable and not septic (angiogram vs cath).  - Cardiology team will follow up and help in planing timing for ischemic evaluation.

## 2019-04-12 NOTE — H&P
"[facility]  Critical Care Medicine  History & Physical    Patient Name: Jenni Todd  MRN: [id number]  Admission Date: 4/12/2019  Hospital Length of Stay: 0 days  Code Status: Full Code  Attending Physician: Mandeep Angeles*   Primary Care Provider: Michael Tan Iii, MD   Principal Problem: Septic shock    Subjective:     HPI:  Ms. Jenni Todd is a 48 y.o. female w/ CAD s/p 2 PCI (2012), HFrEF 30%, HTN, DM, and ESRD who presented from [facility] for hypotension and fever. Admitted to the MICU for septic Shock. Patient earlier today was getting dialysis and her blood pressure was low. She was also noted to have a fever of 101 and was sent to the ED. Per patient she nanci good with no complaints. She denies any cough, nausea, vomiting or abdominal pain , she complained of feeling dizzy. This was her first episode of fever. Patient stated that she was recently discharged a week ago to [facility] after developing  peritonitis. She used to do PD but switched to regular HD after peritonitis. PD cath after becoming septic from peritonitis. Attempts to place tunneled IJ catheters and grafts failed. She has a left femoral catheter placed for HD . Last HD today, took off 1.5 L. Patient reports BP had been low all day since HD, approximately 80 systolic. Her only complaints is "a funny feeling in my center chest" that is not tightness, pressure, or pain.     In the ED patient found hypotensive with lactate > 12.     Per last discharge summary:    used to be on home PD admitted to [facility] on 2/21 for peritonitis. PD catheter removed. Due to hx of recurrent peritonitis she was transitioned to HD. Bilateral IJ tunneled catheters were attempted without success. Now has a temporary dialysis catheter in her left fem. [place] [facility] [place] Nephrology access team attempted a tunneled line and were unsuccessful. Vascular surgery consulted for HD access. Hospital course complicated by GIB with " low risk ulcer. S/p OR 3/22 per vascular for RUE graft (Arsen). 3/28 groin perm-cath placed. NSTEMI 3/29.       Hospital/ICU Course:  No notes on file     Past Medical History:   Diagnosis Date    Abnormal finding on Pap smear, HPV DNA positive     Anemia associated with chronic renal failure     on Epogen    Blood type B+     Bulging discs - symptomatic      CAD (coronary artery disease)     Cardiomyopathy 3/13/2019    Diabetes mellitus, type 2     ESRD (end stage renal disease) 2004    FSGS (focal segmental glomerulosclerosis)     with collapsing    Hyperlipidemia     Hypertension     Neuropathy     NSTEMI (non-ST elevated myocardial infarction) 3/29/2019    Obesity     Secondary hyperparathyroidism, renal     TIA (transient ischemic attack)     Uterine fibroid     small uterine        Past Surgical History:   Procedure Laterality Date    CARDIAC CATHETERIZATION      PCI x 2     SECTION, CLASSIC      COLONOSCOPY N/A 2018    Performed by Rodrigue Russell MD at [facility] ENDO (2ND FLR)    DEBRIDEMENT-WOUND Left 2016    Performed by [facility]ox [facility] at [facility] OR    DIALYSIS FISTULA CREATION      multiple fistulas and grafts before PD     EGD (ESOPHAGOGASTRODUODENOSCOPY) N/A 3/14/2019    Performed by Adolph [facility]ila MD at [facility] ENDO    EGD (ESOPHAGOGASTRODUODENOSCOPY) N/A 3/13/2019    Performed by [facility]ila MD at [facility] ENDO    INCISION AND DRAINAGE (I&D), LABIAL N/A 2016    Performed by Harmony Fernandez MD at [facility] OR    INCISION AND DRAINAGE (I&D), LABIAL (ADD ON) Left 2016    Performed by [facility] at [facility] OR    INCISION AND DRAINAGE OF WOUND Left     VULVAR ABCESS WITH NECROSIS    Insertion, Catheter, Central Venous, Hemodialysis N/A 3/28/2019    Performed by Kimo Weeks MD at [facility] CATH LAB    Insertion, Catheter, Central Venous, Hemodialysis N/A 3/18/2019    Performed by Kimo Weeks MD at [facility] CATH LAB     INSERTION, CATHETER, TUNNELED ABORTED Left 3/14/2019    Performed by Servando Solis MD at [facility] OR    INSERTION, GRAFT, ARTERIOVENOUS, RIGHT ARM Right 3/22/2019    Performed by BESSIE Wynn III, MD at [facility] OR 2ND FLR    ORIF, HIP Left 9/13/2018    Performed by Tevin Grullon MD at [facility] OR    PERITONEAL CATHETER INSERTION      PERMCATH REWIRE- TUNNELED CATH REWIRE Left 11/13/2017    Performed by Baldev Munroe MD at [facility] CATH LAB    PERMCATH REWIRE- TUNNELED CATH REWIRE N/A 10/5/2017    Performed by Baldev Munroe MD at [facility] CATH LAB    REMOVAL, CATHETER, DIALYSIS, PERITONEAL N/A 3/14/2019    Performed by Servando Solis MD at [facility] OR    UMBILICAL HERNIA REPAIR      Venogram, possible intervention Right 3/20/2019    Performed by BESSIE Wynn III, MD at [facility] CATH LAB       Review of patient's allergies indicates:   Allergen Reactions    Clindamycin Diarrhea    Flagyl [metronidazole hcl]     Metronidazole        Family History     Problem Relation (Age of Onset)    Cancer Maternal Grandmother, Paternal Grandfather    Diabetes Maternal Aunt, Paternal Aunt        Tobacco Use    Smoking status: Never Smoker    Smokeless tobacco: Never Used   Substance and Sexual Activity    Alcohol use: No     Comment: Pt reports some social use of about 1-2 drinks about every six months.    Drug use: No    Sexual activity: Not Currently     Partners: Male     Birth control/protection: None      Review of Systems   Constitutional: Positive for fever. Negative for chills and fatigue.   HENT: Negative for congestion.    Respiratory: Negative for chest tightness and shortness of breath.    Cardiovascular: Negative for chest pain, palpitations and leg swelling.   Gastrointestinal: Negative for abdominal pain.   Genitourinary: Positive for decreased urine volume. Negative for pelvic pain.   Musculoskeletal: Negative for back pain.   Skin: Negative for color change, pallor, rash and wound.   Neurological:  Positive for light-headedness. Negative for dizziness.   Psychiatric/Behavioral: Negative for agitation.     Objective:     Vital Signs (Most Recent):  Temp: 97.7 °F (36.5 °C) (04/12/19 0446)  Pulse: 104 (04/12/19 0546)  Resp: 19 (04/12/19 0532)  BP: 102/72 (04/12/19 0546)  SpO2: 100 % (04/12/19 0546) Vital Signs (24h Range):  Temp:  [97.7 °F (36.5 °C)-101.3 °F (38.5 °C)] 97.7 °F (36.5 °C)  Pulse:  [] 104  Resp:  [16-28] 19  SpO2:  [93 %-100 %] 100 %  BP: ()/(48-72) 102/72   Weight: 92.1 kg (203 lb)  Body mass index is 31.79 kg/m².      Intake/Output Summary (Last 24 hours) at 4/12/2019 0611  Last data filed at 4/12/2019 0505  Gross per 24 hour   Intake 1770 ml   Output --   Net 1770 ml       Physical Exam   Constitutional: She appears well-developed and well-nourished. No distress.   HENT:   Head: Normocephalic and atraumatic.   Eyes: Pupils are equal, round, and reactive to light. EOM are normal.   Neck: Normal range of motion. Neck supple. No JVD present.   Cardiovascular: Normal rate, regular rhythm and normal heart sounds.   Pulmonary/Chest: Effort normal and breath sounds normal. No respiratory distress.   Abdominal: Soft. Bowel sounds are normal. She exhibits distension.   Musculoskeletal: She exhibits no edema.   Right femoral site clean dry and intact no erythema or tenderness   Neurological: She is alert.   Psychiatric: She has a normal mood and affect.       Vents:     Lines/Drains/Airways     Central Venous Catheter Line                 Hemodialysis Catheter 03/28/19 1528 left femoral 14 days          Drain                 Hemodialysis AV Fistula Left upper arm -- days         Hemodialysis AV Graft Right upper arm -- days          Peripheral Intravenous Line                 Peripheral IV - Single Lumen 04/12/19 0155 22 G Right Hand less than 1 day         Peripheral IV - Single Lumen 04/12/19 0300 20 G Left Forearm less than 1 day              Significant Labs:    CBC/Anemia Profile:  Recent  Labs   Lab 04/12/19 0222   WBC 15.15*   HGB 7.1*   HCT 24.6*      *   RDW 17.6*        Chemistries:  Recent Labs   Lab 04/11/19 0519 04/12/19 0222    139   K 4.8 5.1    98   CO2 25 18*   BUN 34* 20   CREATININE 7.2* 4.6*   CALCIUM 9.6 9.7   ALBUMIN 1.8* 1.9*   PROT  --  7.5   BILITOT  --  0.3   ALKPHOS  --  129   ALT  --  30   AST  --  99*   MG  --  1.8   PHOS 5.0*  --        Lactic Acid:   Recent Labs   Lab 04/12/19 0222 04/12/19 0433   LACTATE >12.0* 6.2*       Significant Imaging: I have reviewed all pertinent imaging results/findings within the past 24 hours.  I have reviewed and interpreted all pertinent imaging results/findings within the past 24 hours.    Assessment/Plan:     Cardiac/Vascular  CAD (coronary artery disease)  - continue with plavix , was taken off ASA due to GI bleed     Renal/  ESRD (end stage renal disease) on dialysis  - Consult nephrology in the morning   -need to discuss whether this line needs to be removed  - no urgent need for dialysis this moment     Anemia in chronic kidney disease, on chronic dialysis  - continue Epogen per nephrology    ID  * Septic shock  -Patient admitted for hypotension with lactic acid > 12  -Source likely bacteremia / peritonitis /pneumonia . However she has no other symptoms of cough or SOB   - CXR with increased opacity in the Left lobe   - Was given cefepime, vancomycin , and zosyn in the ED.  She also received a total of 1.5L     Plan   - Continue broad spectrum antibiotics with vancomycin and zosyn  (start date 4/12/2019  - Given her ESRD will get random vancomycin in the morning and adjust it   - Continue pressors and wean off goal MAP >65   - Has very poor access need to discuss removal of the femoral line   - Trend lactate       Other  Problem with vascular access  - patient with multiple problems with access  - day team to discuss removing this line as she needs one for dialysis but has poor access  - has peripherals  for now and levophed running through that  - site of femoral line is non tender but need to rule out infection and follow up blood cultures          Critical secondary to Patient has a condition that poses threat to life and bodily function: Septic shock     Critical care was time spent personally by me on the following activities: development of treatment plan with patient or surrogate and bedside caregivers, discussions with consultants, evaluation of patient's response to treatment, examination of patient, ordering and performing treatments and interventions, ordering and review of laboratory studies, ordering and review of radiographic studies, pulse oximetry, re-evaluation of patient's condition. This critical care time did not overlap with that of any other provider or involve time for any procedures.     Patient seen and examined this morning. Discussed with Dr. Angeles  - staff attestation to follow.      Desiree Swift MD  Critical Care Medicine  [facility]

## 2019-04-12 NOTE — Clinical Note
80 ml injected throughout the case. 120 mL total wasted during the case. 200 mL total used in the case.

## 2019-04-12 NOTE — ED NOTES
DR. CARRASCO AT BEDSIDE, NOTIFIED ABOUT BLOOD PRESSURE. 500ML NORMAL SALINE ORDERED PER VERBAL ORDER.

## 2019-04-12 NOTE — PROVIDER PROGRESS NOTES - EMERGENCY DEPT.
Encounter Date: 4/12/2019    ED Physician Progress Notes        Physician Note:   Patient noted to be hypotensive again in the emergency department.  She developed by myself this time.  She is well-appearing and mentating well.  She denies any pain.  IV access was difficult, she previous only had a 22 gauge in her left hand, ultrasound-guided IV 20 gauge obtained in her arm.  Burning 500 cc of IV fluids.  BP is up and down.  On chart review she has history of intermittently low blood pressures, however this time she also has fever noted at the nursing facility.  Labs pending.  Antibiotics ordered.  If continues to be hypotensive despite fluid resuscitation, will need ICU consult.

## 2019-04-12 NOTE — SUBJECTIVE & OBJECTIVE
Past Medical History:   Diagnosis Date    Abnormal finding on Pap smear, HPV DNA positive     Anemia associated with chronic renal failure     on Epogen    Blood type B+     Bulging discs - symptomatic      CAD (coronary artery disease)     Cardiomyopathy 3/13/2019    Diabetes mellitus, type 2     ESRD (end stage renal disease) 2004    FSGS (focal segmental glomerulosclerosis)     with collapsing    Hyperlipidemia     Hypertension     Neuropathy     NSTEMI (non-ST elevated myocardial infarction) 3/29/2019    Obesity     Secondary hyperparathyroidism, renal     TIA (transient ischemic attack)     Uterine fibroid     small uterine        Past Surgical History:   Procedure Laterality Date    CARDIAC CATHETERIZATION      PCI x 2     SECTION, CLASSIC      COLONOSCOPY N/A 2018    Performed by Rodrigue Russell MD at [facility] ENDO (2ND FLR)    DEBRIDEMENT-WOUND Left 2016    Performed by [facility]a [facility]ox MD at [facility] OR    DIALYSIS FISTULA CREATION      multiple fistulas and grafts before PD     EGD (ESOPHAGOGASTRODUODENOSCOPY) N/A 3/14/2019    Performed by Adolph [facility]ila MD at [facility] ENDO    EGD (ESOPHAGOGASTRODUODENOSCOPY) N/A 3/13/2019    Performed by Adolph [facility]ila MD at [facility] ENDO    INCISION AND DRAINAGE (I&D), LABIAL N/A 2016    Performed by Harmony Fernandez MD at [facility] OR    INCISION AND DRAINAGE (I&D), LABIAL (ADD ON) Left 2016    Performed by [facility]a Maddox MD at [facility] OR    INCISION AND DRAINAGE OF WOUND Left     VULVAR ABCESS WITH NECROSIS    Insertion, Catheter, Central Venous, Hemodialysis N/A 3/28/2019    Performed by Kimo Weeks MD at [facility] CATH LAB    Insertion, Catheter, Central Venous, Hemodialysis N/A 3/18/2019    Performed by Kimo [facility]eks MD at [facility] CATH LAB    INSERTION, CATHETER, TUNNELED ABORTED Left 3/14/2019    Performed by Servando Solis MD at [facility] OR    INSERTION, GRAFT, ARTERIOVENOUS, RIGHT ARM Right  3/22/2019    Performed by BESSIE Wynn III, MD at [facility] OR 2ND FLR    ORIF, HIP Left 9/13/2018    Performed by Tevin [facility]ullon MD at [facility] OR    PERITONEAL CATHETER INSERTION      PERMCATH REWIRE- TUNNELED CATH REWIRE Left 11/13/2017    Performed by Baldev Munroe MD at [facility] CATH LAB    PERMCATH REWIRE- TUNNELED CATH REWIRE N/A 10/5/2017    Performed by Baldev Munroe MD at [facility] CATH LAB    REMOVAL, CATHETER, DIALYSIS, PERITONEAL N/A 3/14/2019    Performed by [facility]is MD at [facility] OR    UMBILICAL HERNIA REPAIR      Venogram, possible intervention Right 3/20/2019    Performed by BESSIE Wynn III, MD at [facility] CATH LAB       Review of patient's allergies indicates:   Allergen Reactions    Clindamycin Diarrhea    Flagyl [metronidazole hcl]     Metronidazole          (Not in a hospital admission)  Family History     Problem Relation (Age of Onset)    Cancer Maternal Grandmother, Paternal Grandfather    Diabetes Maternal Aunt, Paternal Aunt        Tobacco Use    Smoking status: Never Smoker    Smokeless tobacco: Never Used   Substance and Sexual Activity    Alcohol use: No     Comment: Pt reports some social use of about 1-2 drinks about every six months.    Drug use: No    Sexual activity: Not Currently     Partners: Male     Birth control/protection: None     Review of Systems   All other systems reviewed and are negative.    Objective:     Vital Signs (Most Recent):  Temp: 97.8 °F (36.6 °C) (04/12/19 0631)  Pulse: 99 (04/12/19 1500)  Resp: 16 (04/12/19 1500)  BP: 103/78 (04/12/19 1500)  SpO2: 100 % (04/12/19 1500) Vital Signs (24h Range):  Temp:  [97.7 °F (36.5 °C)-101.3 °F (38.5 °C)] 97.8 °F (36.6 °C)  Pulse:  [] 99  Resp:  [15-28] 16  SpO2:  [93 %-100 %] 100 %  BP: ()/(48-87) 103/78     Weight: 92.1 kg (203 lb)  Body mass index is 31.79 kg/m².    SpO2: 100 %  O2 Device (Oxygen Therapy): nasal cannula      Intake/Output Summary (Last 24 hours) at 4/12/2019 1623  Last data  filed at 4/12/2019 1155  Gross per 24 hour   Intake 1966.92 ml   Output --   Net 1966.92 ml       Lines/Drains/Airways     Central Venous Catheter Line                 Hemodialysis Catheter 03/28/19 1528 left femoral 15 days          Drain                 Hemodialysis AV Fistula Left upper arm -- days         Hemodialysis AV Graft Right upper arm -- days          Peripheral Intravenous Line                 Peripheral IV - Single Lumen 04/12/19 0155 22 G Right Hand less than 1 day         Peripheral IV - Single Lumen 04/12/19 0300 20 G Left Forearm less than 1 day                Physical Exam   Constitutional: She appears well-developed.   HENT:   Head: Normocephalic.   Eyes: Pupils are equal, round, and reactive to light.   Neck: Normal range of motion.   Pulmonary/Chest: Effort normal.   Abdominal: Soft.   Musculoskeletal: Normal range of motion.   Neurological: She is alert.   Skin: Skin is warm.       Significant Labs:   Blood Culture:   Recent Labs   Lab 04/12/19  0204 04/12/19 0222   LABBLOO No Growth to date No Growth to date   , BMP:   Recent Labs   Lab 04/11/19 0519 04/12/19 0222   * 160*    139   K 4.8 5.1    98   CO2 25 18*   BUN 34* 20   CREATININE 7.2* 4.6*   CALCIUM 9.6 9.7   MG  --  1.8   , CMP   Recent Labs   Lab 04/11/19 0519 04/12/19 0222    139   K 4.8 5.1    98   CO2 25 18*   * 160*   BUN 34* 20   CREATININE 7.2* 4.6*   CALCIUM 9.6 9.7   PROT  --  7.5   ALBUMIN 1.8* 1.9*   BILITOT  --  0.3   ALKPHOS  --  129   AST  --  99*   ALT  --  30   ANIONGAP 13 23*   ESTGFRAFRICA 7.0* 12.1*   EGFRNONAA 6.1* 10.5*    and CBC   Recent Labs   Lab 04/12/19 0222   WBC 15.15*   HGB 7.1*   HCT 24.6*

## 2019-04-12 NOTE — CONSULTS
4:46 AM     Consult received patient to be admitted to the MICU for septic shock. Full note to follow please call for questions.         Desiree Swift MD, MPH  Internal Medicine PGY2  Spectra [phone]

## 2019-04-12 NOTE — ASSESSMENT & PLAN NOTE
- Consult nephrology in the morning   -need to discuss whether this line needs to be removed  - no urgent need for dialysis this moment

## 2019-04-12 NOTE — ED NOTES
ATTEMPTING TO GET BLOOD PRESSURE READING ON PATIENT, UNSUCCESSFUL AT THIS TIME.  Patient placed on continuous cardiac monitor and continuous pulse oximeter.

## 2019-04-12 NOTE — ED NOTES
DR. CARRASCO NOTIFIED OF NEW BLOOD PRESSURE OF 71/52 AFTER 500ML BOLUS. VERBAL ORDER TO STARTED ANOTHER 500ML BOLUS.

## 2019-04-12 NOTE — PROGRESS NOTES
[facility]                                  Skilled Nursing Facility                   Progress Note     Admit Date: 2019  JESSY TBD   Principal Problem:  Septic shock   HPI obtained from patient interview and chart review     Chief Complaint: Pt with complaints of hemorrhoids overnight      HPI:   Nurse reporting that the patient is complaining of hemorrhoids are flaring up and she would like something to treat is. Initiated phenyleph-shark radha oil-mo-pet ointment 1 applicator 4 x day prn.    Past Medical History: Patient has a past medical history of Abnormal finding on Pap smear, HPV DNA positive (), Anemia associated with chronic renal failure, Blood type B+, Bulging discs - symptomatic , CAD (coronary artery disease), Cardiomyopathy (3/13/2019), Diabetes mellitus, type 2, ESRD (end stage renal disease) (2004), FSGS (focal segmental glomerulosclerosis), Hyperlipidemia, Hypertension (), Neuropathy, NSTEMI (non-ST elevated myocardial infarction) (3/29/2019), Obesity, Secondary hyperparathyroidism, renal, TIA (transient ischemic attack), and Uterine fibroid.    Past Surgical History: Patient has a past surgical history that includes Peritoneal catheter insertion; Umbilical hernia repair;  section, classic; Dialysis fistula creation; Cardiac catheterization; Incision and drainage of wound (Left, ); Open reduction and internal fixation (ORIF) of injury of hip (Left, 2018); Colonoscopy (N/A, 2018); Esophagogastroduodenoscopy (N/A, 3/13/2019); Esophagogastroduodenoscopy (N/A, 3/14/2019); Peritoneal catheter removal (N/A, 3/14/2019); Insertion of tunneled central venous hemodialysis catheter (N/A, 3/18/2019); Phlebography (Right, 3/20/2019); Placement of arteriovenous graft (Right, 3/22/2019); and Insertion of tunneled central venous hemodialysis catheter (N/A, 3/28/2019).    Social History: Patient reports  that she has never smoked. She has never used smokeless tobacco. She reports that she does not drink alcohol or use drugs.    Family History: family history includes Cancer in her maternal grandmother and paternal grandfather; Diabetes in her maternal aunt and paternal aunt.    Allergies: Patient is allergic to clindamycin; flagyl [metronidazole hcl]; and metronidazole.    ROS  Constitutional: Negative for fever and malaise/fatigue.   Eyes: Negative for blurred vision, double vision and discharge.   Respiratory: Negative for cough, shortness of breath and wheezing.    Cardiovascular: Negative for chest pain, palpitations, claudication, leg swelling and PND.   Gastrointestinal: Negative for abdominal pain, constipation, diarrhea, nausea and vomiting.   Genitourinary: Negative for dysuria, frequency and urgency.   Musculoskeletal:  + generalized weakness. Negative for back pain and myalgias.   Skin: Negative for itching and rash.   Neurological: Negative for dizziness, speech change, seizures, and headaches.   Psychiatric/Behavioral: Negative for depression. The patient is not nervous/anxious.      PEx  Temp:  [97.7 °F (36.5 °C)-101.3 °F (38.5 °C)]   Pulse:  []   Resp:  [16-28]   BP: ()/(48-87)   SpO2:  [93 %-100 %]      Constitutional: Patient appears well-developed and well-nourished.   HENT:   Head: Normocephalic and atraumatic.   Eyes: Pupils are equal, round, and reactive to light.   Neck: Normal range of motion. Neck supple.   Cardiovascular: Normal rate, regular rhythm and normal heart sounds.    Pulmonary/Chest: Effort normal and breath sounds are clear  Abdominal: Soft. Bowel sounds are normal.   Musculoskeletal: Normal range of motion. + weakness  Neurological: Alert and oriented to person, place, and time.   Skin: Skin is warm and dry. RUE graft without thrill or bruit  Psychiatric: Normal mood and affect. Behavior is normal.     Assessment and Plan:  Hemorrhoids  4/11 Initiated phenyleph-shark  "radha oil-mo-pet ointment 1 applicator 4 x day prn    CONTINUED    Peritonitis due to peritoneal dialysis catheter   - Finished course of cefepime as per ID  - Wound care consulted for PD site wound care  - 4/8 Called Dr. Leger's nurse to confirm need for appt on 4/12    - 4/8 Bun/Cr at 23/6.8, gets HD TTS.   - 4/9 H&H at 7.2/23.7(likely dilutional), Bun/Cr at 35/7.8, and Phos at 5.2, Dialysis today, continue to monitor weekly    Diabetes mellitus, type 2  - A1c 6.3, was getting intra-peritoneal insulin. Catheter now removed.   - detemir 10 units nightly   - Glucose at 47 on am labs, Nateglinide discontinued    NSTEMI (non-ST elevated myocardial infarction)  - 3/29 Tn 0.2>1.0  after a tachy followed by hypotensive episode the day after her groin perm-cath was placed. cards consulted 3/29 "recommend continuing medical therapy with Plavix 75 mg QD and Atorvastatin 40 mg QD; would avoid ASA given recent GIB .    - per cards "Please ensure she follows up with her Cardiologist, Dr Calos Gavin on discharge to set up a stress test."     CAD  - remote stents.   - Was on DAPT but held due to GIB 3/13 and need for procedures here. Only plavix restarted given recent GIB per cards recs 3/29.    - holding BB due to recent low BP     Acute gastric ulcer with hemorrhage  GIB  - no more bleeding, on PPI  - biopsy neg for malignancy or H.pylori  - 4/8 H&H stable at 7.6/25.9, will trend CBC in the am     Pressure injury stage 2 on left buttock   - consulted wound care     Chronic systolic heart failure  - EF 30% on TTE, stable  - Hold antihypertensive due to low BP, resume as able, can be done OP     Debility  Generalized weakness  Debility   - Continue with PT/OT for gait training and strengthening and restoration of ADL's   - Encourage mobility, OOB in chair, and early ambulation as appropriate  - Fall precautions   - Monitor for bowel and bladder dysfunction  - Monitor for and prevent skin breakdown and pressure ulcers  - " Continue DVT prophylaxis with teds/scds given recent GIB    Diabetic neuropathy  - initiated home gabapentin 800 mg t.i.d.     F/U- Cardiologist - Dr Calos Gavin on discharge to set up a stress test. And Dr. Alonso for new PD placement in clinic    Cleveland Monterroso NP

## 2019-04-12 NOTE — CONSULTS
[facility]  Interventional Cardiology  Consult Note    Patient Name: Jenni Todd  MRN: [id number]  Admission Date: 4/12/2019  Hospital Length of Stay: 0 days  Code Status: Full Code   Attending Provider: Mandeep Angeles*  Consulting Provider: Juan Melgar MD  Primary Care Physician: Michael Tan Iii, MD  Principal Problem:Septic shock    Patient information was obtained from patient and ER records.     Inpatient consult to Cardiology  Consult performed by: Juan Melgar MD  Consult ordered by: Najma Smith DO        Subjective:     Chief Complaint:  Chest pain      HPI:  Ms Todd is a pleasant 48 y.o female with history of CAD presented with fever and hypotension and was found to be septic. Cardiology consulted for possible NSTEMI    Patient mentioned yesterday she had hypotension and she felt sick during her dialysis session. She also mentioned pressure substernal pain for <20 minutes that was resolved saponaceously, she said in 2012 she didn't have pain. She was brought to ED and she was found to have leukocytosis and she has elevated lactate. She is a patient of Dr Live and she has PMH of ESRD on dialysis, CAD s/p PCI of RCA for NSTEMI in 2012 with two BMS 3.0 x 26 and 2.5 x 18 mm, hyperlipidemia, hypertension and PAD.          Past Medical History:   Diagnosis Date    Abnormal finding on Pap smear, HPV DNA positive 2014    Anemia associated with chronic renal failure     on Epogen    Blood type B+     Bulging discs - symptomatic      CAD (coronary artery disease)     Cardiomyopathy 3/13/2019    Diabetes mellitus, type 2     ESRD (end stage renal disease) 04/20/2004    FSGS (focal segmental glomerulosclerosis)     with collapsing    Hyperlipidemia     Hypertension 2004    Neuropathy     NSTEMI (non-ST elevated myocardial infarction) 3/29/2019    Obesity     Secondary hyperparathyroidism, renal     TIA (transient ischemic attack)     Uterine  fibroid     small uterine        Past Surgical History:   Procedure Laterality Date    CARDIAC CATHETERIZATION      PCI x 2     SECTION, CLASSIC      COLONOSCOPY N/A 2018    Performed by Rodrigue Russell MD at [facility] ENDO (2ND FLR)    DEBRIDEMENT-WOUND Left 2016    Performed by Teressa Maddox MD at [facility] [place] operated by [facility] OR    DIALYSIS FISTULA CREATION      multiple fistulas and grafts before PD     EGD (ESOPHAGOGASTRODUODENOSCOPY) N/A 3/14/2019    Performed by Adolph Davila MD at [facility] ENDO    EGD (ESOPHAGOGASTRODUODENOSCOPY) N/A 3/13/2019    Performed by Adolph Davila MD at [facility] ENDO    INCISION AND DRAINAGE (I&D), LABIAL N/A 2016    Performed by Harmony Fernandez MD at [facility] [place] OR    INCISION AND DRAINAGE (I&D), LABIAL (ADD ON) Left 2016    Performed by Teressa Maddox MD at [facility] [place] OR    INCISION AND DRAINAGE OF WOUND Left     VULVAR ABCESS WITH NECROSIS    Insertion, Catheter, Central Venous, Hemodialysis N/A 3/28/2019    Performed by Kimo Weeks MD at [facility] CATH LAB    Insertion, Catheter, Central Venous, Hemodialysis N/A 3/18/2019    Performed by Kimo Weeks MD at [facility] CATH LAB    INSERTION, CATHETER, TUNNELED ABORTED Left 3/14/2019    Performed by Servando Solis MD at [facility] OR    INSERTION, GRAFT, ARTERIOVENOUS, RIGHT ARM Right 3/22/2019    Performed by BESSIE Wynn III, MD at [facility] OR 2ND FLR    ORIF, HIP Left 2018    Performed by Tevin Grullon MD at [facility] [place] OR    PERITONEAL CATHETER INSERTION      PERMCATH REWIRE- TUNNELED CATH REWIRE Left 2017    Performed by Baldev Munroe MD at [facility] [place] CATH LAB    PERMCATH REWIRE- TUNNELED CATH REWIRE N/A 10/5/2017    Performed by Baldev Munroe MD at [facility] [place] CATH LAB    REMOVAL, CATHETER, DIALYSIS, PERITONEAL N/A 3/14/2019    Performed by Servando Solis MD at [facility] OR    UMBILICAL HERNIA REPAIR      Venogram, possible intervention Right 3/20/2019    Performed by BESSIE Wynn III, MD at [facility] CATH  LAB       Review of patient's allergies indicates:   Allergen Reactions    Clindamycin Diarrhea    Flagyl [metronidazole hcl]     Metronidazole          (Not in a hospital admission)  Family History     Problem Relation (Age of Onset)    Cancer Maternal Grandmother, Paternal Grandfather    Diabetes Maternal Aunt, Paternal Aunt        Tobacco Use    Smoking status: Never Smoker    Smokeless tobacco: Never Used   Substance and Sexual Activity    Alcohol use: No     Comment: Pt reports some social use of about 1-2 drinks about every six months.    Drug use: No    Sexual activity: Not Currently     Partners: Male     Birth control/protection: None     Review of Systems   All other systems reviewed and are negative.    Objective:     Vital Signs (Most Recent):  Temp: 97.8 °F (36.6 °C) (04/12/19 0631)  Pulse: 99 (04/12/19 1500)  Resp: 16 (04/12/19 1500)  BP: 103/78 (04/12/19 1500)  SpO2: 100 % (04/12/19 1500) Vital Signs (24h Range):  Temp:  [97.7 °F (36.5 °C)-101.3 °F (38.5 °C)] 97.8 °F (36.6 °C)  Pulse:  [] 99  Resp:  [15-28] 16  SpO2:  [93 %-100 %] 100 %  BP: ()/(48-87) 103/78     Weight: 92.1 kg (203 lb)  Body mass index is 31.79 kg/m².    SpO2: 100 %  O2 Device (Oxygen Therapy): nasal cannula      Intake/Output Summary (Last 24 hours) at 4/12/2019 1623  Last data filed at 4/12/2019 1155  Gross per 24 hour   Intake 1966.92 ml   Output --   Net 1966.92 ml       Lines/Drains/Airways     Central Venous Catheter Line                 Hemodialysis Catheter 03/28/19 1528 left femoral 15 days          Drain                 Hemodialysis AV Fistula Left upper arm -- days         Hemodialysis AV Graft Right upper arm -- days          Peripheral Intravenous Line                 Peripheral IV - Single Lumen 04/12/19 0155 22 G Right Hand less than 1 day         Peripheral IV - Single Lumen 04/12/19 0300 20 G Left Forearm less than 1 day                Physical Exam   Constitutional: She appears well-developed.    HENT:   Head: Normocephalic.   Eyes: Pupils are equal, round, and reactive to light.   Neck: Normal range of motion.   Pulmonary/Chest: Effort normal.   Abdominal: Soft.   Musculoskeletal: Normal range of motion.   Neurological: She is alert.   Skin: Skin is warm.       Significant Labs:   Blood Culture:   Recent Labs   Lab 04/12/19 0204 04/12/19 0222   LABBLOO No Growth to date No Growth to date   , BMP:   Recent Labs   Lab 04/11/19 0519 04/12/19 0222   * 160*    139   K 4.8 5.1    98   CO2 25 18*   BUN 34* 20   CREATININE 7.2* 4.6*   CALCIUM 9.6 9.7   MG  --  1.8   , CMP   Recent Labs   Lab 04/11/19 0519 04/12/19 0222    139   K 4.8 5.1    98   CO2 25 18*   * 160*   BUN 34* 20   CREATININE 7.2* 4.6*   CALCIUM 9.6 9.7   PROT  --  7.5   ALBUMIN 1.8* 1.9*   BILITOT  --  0.3   ALKPHOS  --  129   AST  --  99*   ALT  --  30   ANIONGAP 13 23*   ESTGFRAFRICA 7.0* 12.1*   EGFRNONAA 6.1* 10.5*    and CBC   Recent Labs   Lab 04/12/19 0222   WBC 15.15*   HGB 7.1*   HCT 24.6*              Assessment and Plan:     NSTEMI (non-ST elevated myocardial infarction)  - Patient with low Hgb and currently septic with hypotension and elevated lactate.   - Extensive cardiac history, EKG with ischemic ST depression in lateral leads, high troponin and pressure chest pain yesterday.  - This may represent type 1 vs type 2 MI.     Recommendations:    - Treat as NSTEMI, load with aspirin, continue aspirin and plavix daily.  - Heparin gtt if ok with primary team.   - Consider blood transfusion.   - Will need ischemic evaluation when more stable and not septic (angiogram vs cath).  - Cardiology team will follow up and help in planing timing for ischemic evaluation.     Thank you for your consult, please call cariology for any question.     Juan Melgar MD  Cardiology Fellow  Pager: [phone]

## 2019-04-12 NOTE — HPI
We are consulted for evaluation of NSTEMI in this 49yo lady who presented with hypotension after dialysis thought to be septic shock (but cultures have been negative, WBC is elevated but stable at 10-15km and no fevers as inpatient).  She presented with hypotension after dialysis on Thursday, one episode of chest pressure on Thurs that resolved after 20min (though she denies ever having chest pain/pressure and has a history of diabetic neuropathy), but does have a history of CAD with PCI x2 to the proximal and distal RCA in 2012 at [place].     She is a patient of Dr Live and she has other PMH of ESRD on dialysis, hyperlipidemia, hypertension and PAD. Recently she was admitted to [facility] on 2/21 for peritonitis, had PD catheter removed. Had complicated HD access with failure of Bilateral IJ tunneled catheters, resulting in left femoral temporary catheter placement. Hospital course complicated by GIB with low risk ulcer. S/p OR 3/22 per vascular for RUE graft ([place]). 3/28 groin perm-cath placed.    Currently she states she feels well and has no complaints.

## 2019-04-12 NOTE — ED TRIAGE NOTES
Psychosocial:  Patient is calm and cooperative.  Patients insight and judgement are appropriate to situation.  Appears clean, well maintained, with clothing appropriate to environment.  No evidence of delusions, hallucinations, or psychosis.     Neuro:  Eyes open spontaneously.  Awake, alert, oriented x 4.  Speech clear and appropriate.  Tolerating saliva secretions well.  Able to follow commands, demonstrating ability to actively and appropriately communicate within context of current conversation.  Symmetrical facial muscles.  Moving all extremities well  Adequate muscle tone present.    Movement is purposeful.      Airway:  Bilateral chest rise and fall.  RR regular and non-labored.  Air entry patent with and clear x 5 lobes of the lungs.  No crepitus or subcutaneous emphysema noted on palpation.       Circulatory:  Skin warm, dry, and pink.  Apical and radial pulses strong and regular.  Capillary refill/skin blanching less than 3 seconds to distal of 4 extremities.     Abdomen:  Abdomen soft and non-distended.  Positive normo-active bowel sounds x 4 quadrants.       Urinary: pt does not urine. Goes to dialysis.      Extremities:  No redness, heat, deformity, or pain.     Skin:  two puncture sites on abdomen from peritoneal dialysis. Clean ,dry , and intact dressings. Left femoral catheter intact with dressing dry,clean, and intact.

## 2019-04-12 NOTE — SUBJECTIVE & OBJECTIVE
Past Medical History:   Diagnosis Date    Abnormal finding on Pap smear, HPV DNA positive     Anemia associated with chronic renal failure     on Epogen    Blood type B+     Bulging discs - symptomatic      CAD (coronary artery disease)     Cardiomyopathy 3/13/2019    Diabetes mellitus, type 2     ESRD (end stage renal disease) 2004    FSGS (focal segmental glomerulosclerosis)     with collapsing    Hyperlipidemia     Hypertension     Neuropathy     NSTEMI (non-ST elevated myocardial infarction) 3/29/2019    Obesity     Secondary hyperparathyroidism, renal     TIA (transient ischemic attack)     Uterine fibroid     small uterine        Past Surgical History:   Procedure Laterality Date    CARDIAC CATHETERIZATION      PCI x 2     SECTION, CLASSIC      COLONOSCOPY N/A 2018    Performed by Rodrigue Russell MD at [facility] ENDO (2ND FLR)    DEBRIDEMENT-WOUND Left 2016    Performed by Teressa Maddox MD at [facility] OR    DIALYSIS FISTULA CREATION      multiple fistulas and grafts before PD     EGD (ESOPHAGOGASTRODUODENOSCOPY) N/A 3/14/2019    Performed by Adolph Davila MD at [facility] ENDO    EGD (ESOPHAGOGASTRODUODENOSCOPY) N/A 3/13/2019    Performed by Adolph Davila MD at [facility] ENDO    INCISION AND DRAINAGE (I&D), LABIAL N/A 2016    Performed by Harmony Fernandez MD at [facility] OR    INCISION AND DRAINAGE (I&D), LABIAL (ADD ON) Left 2016    Performed by Teressa Maddox MD at [facility] OR    INCISION AND DRAINAGE OF WOUND Left     VULVAR ABCESS WITH NECROSIS    Insertion, Catheter, Central Venous, Hemodialysis N/A 3/28/2019    Performed by Kimo Weeks MD at [facility] CATH LAB    Insertion, Catheter, Central Venous, Hemodialysis N/A 3/18/2019    Performed by Kimo Weeks MD at [facility] CATH LAB    INSERTION, CATHETER, TUNNELED ABORTED Left 3/14/2019    Performed by Servando Solis MD at [facility] OR    INSERTION, GRAFT, ARTERIOVENOUS, RIGHT ARM Right  3/22/2019    Performed by BESSIE Wynn III, MD at [facility] OR 2ND FLR    ORIF, HIP Left 9/13/2018    Performed by [facility]evin [facility]ullon [facility]    PERITONEAL CATHETER INSERTION      PERMCATH REWIRE- TUNNELED CATH REWIRE Left 11/13/2017    Performed by Baldev Munroe MD at [facility] CATH LAB    PERMCATH REWIRE- TUNNELED CATH REWIRE N/A 10/5/2017    Performed by Baldev Munroe [facility] at [facility]    REMOVAL, CATHETER, DIALYSIS, PERITONEAL N/A 3/14/2019    Performed by Servando Solis MD at [facility] OR    UMBILICAL HERNIA REPAIR      Venogram, possible intervention Right 3/20/2019    Performed by BESSIE Wynn III, MD at [facility] CATH LAB       Review of patient's allergies indicates:   Allergen Reactions    Clindamycin Diarrhea    Flagyl [metronidazole hcl]     Metronidazole      Current Facility-Administered Medications   Medication Frequency    atorvastatin tablet 40 mg QHS    calcitRIOL capsule 0.5 mcg Daily    clopidogrel tablet 75 mg Daily    dextrose 50% injection 12.5 g PRN    dextrose 50% injection 25 g PRN    glucagon (human recombinant) injection 1 mg PRN    glucose chewable tablet 16 g PRN    glucose chewable tablet 24 g PRN    heparin (porcine) injection 5,000 Units Q8H    heparin 25,000 units in dextrose 5% (100 units/ml) IV bolus from bag - ADDITIONAL PRN BOLUS - 30 units/kg (max bolus 4000 units) PRN    heparin 25,000 units in dextrose 5% (100 units/ml) IV bolus from bag - ADDITIONAL PRN BOLUS - 60 units/kg (max bolus 4000 units) PRN    heparin 25,000 units in dextrose 5% (100 units/ml) IV bolus from bag INITIAL BOLUS (max bolus 4000 units) Once    heparin 25,000 units in dextrose 5% 250 mL (100 units/mL) infusion LOW INTENSITY nomogram - [facility] Continuous    insulin aspart U-100 pen 0-5 Units QID (AC + HS) PRN    norepinephrine 4 mg in dextrose 5% 250 mL infusion (premix) (titrating) Continuous    pantoprazole EC tablet 40 mg Daily    [START ON 4/13/2019] piperacillin-tazobactam 4.5  g in sodium chloride 0.9% 100 mL IVPB (ready to mix system) Q12H    sevelamer carbonate tablet 800 mg TID WM     Current Outpatient Medications   Medication    amLODIPine (NORVASC) 10 MG tablet    aspirin (ECOTRIN) 81 MG EC tablet    atorvastatin (LIPITOR) 40 MG tablet    calcitRIOL (ROCALTROL) 0.25 MCG Cap    cinacalcet (SENSIPAR) 90 MG Tab    epoetin adonay 10,000 unit/mL Soln 1 mL, epoetin adonay 20,000 unit/mL Soln 1 mL    gabapentin (NEURONTIN) 100 MG capsule    insulin detemir U-100 (LEVEMIR FLEXTOUCH) 100 unit/mL (3 mL) SubQ InPn pen    nateglinide (STARLIX) 120 MG tablet    ONETOUCH VERIO Strp    pantoprazole (PROTONIX) 40 MG tablet    sevelamer carbonate (RENVELA) 800 mg Tab    vitamin renal formula, B-complex-vitamin c-folic acid, (B COMPLEX-C-FOLIC ACID) 1 mg Cap     Facility-Administered Medications Ordered in Other Encounters   Medication Frequency    [MAR Hold - Suspended Admission] 0.9%  NaCl infusion PRN    [MAR Hold - Suspended Admission] 0.9%  NaCl infusion PRN    [MAR Hold - Suspended Admission] acetaminophen tablet 650 mg Q6H PRN    [MAR Hold - Suspended Admission] acetaminophen tablet 650 mg Q4H PRN    [MAR Hold - Suspended Admission] atorvastatin tablet 40 mg QHS    [MAR Hold - Suspended Admission] calcitRIOL capsule 0.5 mcg Daily    [MAR Hold - Suspended Admission] calcium carbonate 200 mg calcium (500 mg) chewable tablet 500 mg BID PRN    [MAR Hold - Suspended Admission] cinacalcet tablet 90 mg Daily with breakfast    [MAR Hold - Suspended Admission] clopidogrel tablet 75 mg Daily    [MAR Hold - Suspended Admission] dextrose 50% injection 12.5 g PRN    [MAR Hold - Suspended Admission] dextrose 50% injection 25 g PRN    [MAR Hold - Suspended Admission] epoetin adonay injection 9,300 Units Every Tues, Thurs, Sat    [MAR Hold - Suspended Admission] gabapentin capsule 100 mg TID    [MAR Hold - Suspended Admission] glucagon (human recombinant) injection 1 mg PRN    [MAR Hold  - Suspended Admission] glucose chewable tablet 16 g PRN    [MAR Hold - Suspended Admission] glucose chewable tablet 24 g PRN    [MAR Hold - Suspended Admission] heparin (porcine) injection 1,000 Units PRN    [MAR Hold - Suspended Admission] heparin (porcine) injection 2,000 Units PRN    [MAR Hold - Suspended Admission] heparin (porcine) injection 2,000 Units PRN    [MAR Hold - Suspended Admission] insulin aspart U-100 pen 0-5 Units QID (AC + HS) PRN    [MAR Hold - Suspended Admission] insulin detemir U-100 pen 10 Units QHS    [MAR Hold - Suspended Admission] loperamide capsule 2 mg QID PRN    [MAR Hold - Suspended Admission] ondansetron disintegrating tablet 4 mg Q8H PRN    [MAR Hold - Suspended Admission] ondansetron disintegrating tablet 8 mg Q8H PRN    [MAR Hold - Suspended Admission] pantoprazole EC tablet 40 mg Daily    [MAR Hold - Suspended Admission] phenyleph-shark radha oil-mo-pet ointment 1 applicator QID PRN    [MAR Hold - Suspended Admission] polyethylene glycol packet 17 g BID PRN    [MAR Hold - Suspended Admission] promethazine tablet 25 mg Q6H PRN    [MAR Hold - Suspended Admission] ramelteon tablet 8 mg Nightly PRN    [MAR Hold - Suspended Admission] senna-docusate 8.6-50 mg per tablet 1 tablet BID    [MAR Hold - Suspended Admission] sevelamer carbonate tablet 800 mg TID WM    [MAR Hold - Suspended Admission] vitamin renal formula (B-complex-vitamin c-folic acid) 1 mg per capsule 1 capsule Daily     Family History     Problem Relation (Age of Onset)    Cancer Maternal Grandmother, Paternal Grandfather    Diabetes Maternal Aunt, Paternal Aunt        Tobacco Use    Smoking status: Never Smoker    Smokeless tobacco: Never Used   Substance and Sexual Activity    Alcohol use: No     Comment: Pt reports some social use of about 1-2 drinks about every six months.    Drug use: No    Sexual activity: Not Currently     Partners: Male     Birth control/protection: None     Review of Systems    Constitutional: Positive for activity change, fatigue and fever. Negative for chills.   HENT: Negative for congestion, postnasal drip, rhinorrhea, sinus pressure and sinus pain.    Respiratory: Negative for cough, chest tightness, shortness of breath and wheezing.    Cardiovascular: Negative for chest pain, palpitations and leg swelling.   Gastrointestinal: Positive for blood in stool. Negative for abdominal distention, abdominal pain, constipation, diarrhea, nausea and vomiting.   Musculoskeletal: Negative for arthralgias, gait problem, joint swelling and myalgias.   Skin: Negative for color change, pallor and rash.   Neurological: Negative for dizziness, light-headedness and headaches.   Psychiatric/Behavioral: Negative for agitation, behavioral problems and confusion.     Objective:     Vital Signs (Most Recent):  Temp: 97.8 °F (36.6 °C) (04/12/19 0631)  Pulse: 100 (04/12/19 1115)  Resp: 16 (04/12/19 1115)  BP: 100/68 (04/12/19 1115)  SpO2: 100 % (04/12/19 1115)  O2 Device (Oxygen Therapy): nasal cannula (04/12/19 1115) Vital Signs (24h Range):  Temp:  [97.7 °F (36.5 °C)-101.3 °F (38.5 °C)] 97.8 °F (36.6 °C)  Pulse:  [] 100  Resp:  [16-28] 16  SpO2:  [93 %-100 %] 100 %  BP: ()/(48-87) 100/68     Weight: 92.1 kg (203 lb) (04/12/19 0022)  Body mass index is 31.79 kg/m².  Body surface area is 2.09 meters squared.    I/O last 3 completed shifts:  In: 2490 [P.O.:940; IV Piggyback:1550]  Out: -     Physical Exam   Constitutional: She is oriented to person, place, and time. She appears well-developed and well-nourished. No distress.   HENT:   Head: Normocephalic and atraumatic.   Right Ear: External ear normal.   Left Ear: External ear normal.   Eyes: Conjunctivae and EOM are normal. Right eye exhibits no discharge. Left eye exhibits no discharge.   Neck: Normal range of motion. Neck supple.   Cardiovascular: Normal rate and regular rhythm. Exam reveals no gallop and no friction rub.   No murmur  heard.  Pulmonary/Chest: Effort normal and breath sounds normal. No respiratory distress. She has no wheezes. She has no rales.   Abdominal: Soft. Bowel sounds are normal. She exhibits no distension. There is no tenderness.   Musculoskeletal: Normal range of motion. She exhibits no edema or deformity.   Neurological: She is alert and oriented to person, place, and time.   Skin: Skin is warm and dry. She is not diaphoretic.   L femoral tunneled dialysis catheter   Psychiatric: She has a normal mood and affect. Her behavior is normal.       Significant Labs:  ABGs:   Recent Labs   Lab 04/12/19 0752   PH 7.301*   PCO2 47.1*   HCO3 23.2*   POCSATURATED 31*   BE -3     CBC:   Recent Labs   Lab 04/12/19 0222   WBC 15.15*   RBC 2.39*   HGB 7.1*   HCT 24.6*      *   MCH 29.7   MCHC 28.9*     CMP:   Recent Labs   Lab 04/12/19 0222   *   CALCIUM 9.7   ALBUMIN 1.9*   PROT 7.5      K 5.1   CO2 18*   CL 98   BUN 20   CREATININE 4.6*   ALKPHOS 129   ALT 30   AST 99*   BILITOT 0.3

## 2019-04-12 NOTE — ED NOTES
Pt resting peacefully in bed. On cardiac monitor and O2 monitor. Pt receiving antibiotics and norepinephrine . Blood pressure stable. will continue to monitor frequently pt status frequently. Denies current needs or concerns. .

## 2019-04-12 NOTE — ED NOTES
CRISTOBAL Cyr at bedside receiving report. Pt is to be transferred to ED bed 7 for closer observation.

## 2019-04-12 NOTE — ASSESSMENT & PLAN NOTE
-Patient admitted for hypotension with lactic acid > 12  -Source likely bacteremia / peritonitis /pneumonia . However she has no other symptoms of cough or SOB   - CXR with increased opacity in the Left lobe   - Was given cefepime, vancomycin , and zosyn in the ED.  She also received a total of 1.5L     Plan   - Continue broad spectrum antibiotics with vancomycin and zosyn  (start date 4/12/2019  - Given her ESRD will get random vancomycin in the morning and adjust it   - Continue pressors and wean off goal MAP >65   - Has very poor access need to discuss removal of the femoral line   - Trend lactate

## 2019-04-12 NOTE — NURSING
"Patient complained of tightness in her chest pain 4/10, generalized weakness, and states "I don't feel well", VS: BP 82/52, T 101.1F, and . Charge nurse notified of abnormal vitals. On call provider paged. @ 2015: New orders given for CXR, labs, and EKG. @ 2325  Patient becoming increasingly restless, diaphoretic, and "feel short of breath". . On call provider notified of current situation. New orders to send pt to ED for evaluation. @ 2340: Report called to ED. @ 2345: EMS arrived to unit for patient transfer. @ 0015: Pt off unit via stretcher per Ricardo to transport to ED.  Multiple unsuccessful attempts were made to obtain BPs and lab draws on patient. Charge nurse & on call provider aware.  "

## 2019-04-12 NOTE — CONSULTS
[facility]  Nephrology  Consult Note    Patient Name: Jenni Todd  MRN: [id number]  Admission Date: 4/12/2019  Hospital Length of Stay: 0 days  Attending Provider: Mandeep Angeles*   Primary Care Physician: Michael Tan Iii, MD  Principal Problem:Septic shock    Inpatient consult to Nephrology  Consult performed by: Andrew Ponce NP  Consult ordered by: Desiree Swift MD  Reason for consult: ESRD        Subjective:     HPI: Ms. Todd is a 47 yo female with CAD s/p 2 PCI (2012), HFrEF 30%, HTN, DM, and ESRD who presented from [facility] for hypotension and fever.  She reports going to dialysis on 4/11 in which 1.7L of fluid was removed.  She had hypotension during the treatment, but remained asymptomatic.  She was transferred back to [facility] afterwards where her blood pressures continued to run low, but patient started becoming symptomatic with chest pressure, lightheadedness and SOB.  Nursing staff at the [facility] were unable to obtain a blood pressure despite IVF bolus and she was then transferred to [facility] for further management.  She had a fever of 101, which resolved with tylenol administration per her account.  In the ED, she was found to have continued hypotension and was started on levo for BP support.  Lactate elevated (>12) and she was bolused with 1.5L of NS.  VBG with pH of 7.3. She denies any fever/chills during dialysis. Nephrology has been consulted for ESRD management while in-patient.    She used to be on home CCPD, admitted to [facility] on 2/21 for peritonitis. PD catheter removed due to hx of recurrent peritonitis and she was transitioned to HD. Bilateral IJ tunneled catheters were attempted without success due to thrombus. Now has a temporary dialysis catheter in her left fem that was placed 3/28 by ALONZO.  Prior to permacath placement, she had a next day graft placed by Dr. Leger on 3/22, but it was never used and was clotted 1 day post-op.  Patient refused to have the  graft declotted, so the permacatch was placed at that time.          Past Medical History:   Diagnosis Date    Abnormal finding on Pap smear, HPV DNA positive     Anemia associated with chronic renal failure     on Epogen    Blood type B+     Bulging discs - symptomatic      CAD (coronary artery disease)     Cardiomyopathy 3/13/2019    Diabetes mellitus, type 2     ESRD (end stage renal disease) 2004    FSGS (focal segmental glomerulosclerosis)     with collapsing    Hyperlipidemia     Hypertension     Neuropathy     NSTEMI (non-ST elevated myocardial infarction) 3/29/2019    Obesity     Secondary hyperparathyroidism, renal     TIA (transient ischemic attack)     Uterine fibroid     small uterine        Past Surgical History:   Procedure Laterality Date    CARDIAC CATHETERIZATION      PCI x 2     SECTION, CLASSIC      COLONOSCOPY N/A 2018    Performed by Rodrigue Russell MD at [facility] ENDO (2ND FLR)    DEBRIDEMENT-WOUND Left 2016    Performed by Teressa Maddox MD at [facility] OR    DIALYSIS FISTULA CREATION      multiple fistulas and grafts before PD     EGD (ESOPHAGOGASTRODUODENOSCOPY) N/A 3/14/2019    Performed by Adolph Davila MD at [facility] ENDO    EGD (ESOPHAGOGASTRODUODENOSCOPY) N/A 3/13/2019    Performed by Aodlph Davila MD at [facility] ENDO    INCISION AND DRAINAGE (I&D), LABIAL N/A 2016    Performed by Harmony Fernandez MD at [facility] OR    INCISION AND DRAINAGE (I&D), LABIAL (ADD ON) Left 2016    Performed by Teressa Maddox MD at [facility] OR    INCISION AND DRAINAGE OF WOUND Left     VULVAR ABCESS WITH NECROSIS    Insertion, Catheter, Central Venous, Hemodialysis N/A 3/28/2019    Performed by Kimo Weeks MD at [facility] CATH LAB    Insertion, Catheter, Central Venous, Hemodialysis N/A 3/18/2019    Performed by Kimo Weeks MD at [facility] CATH LAB    INSERTION, CATHETER, TUNNELED ABORTED Left 3/14/2019    Performed by Servando FERRARI  MD Will at [facility] OR    INSERTION, GRAFT, ARTERIOVENOUS, RIGHT ARM Right 3/22/2019    Performed by BESSIE Wynn III, MD at [facility] OR 2ND FLR    ORIF, HIP Left 9/13/2018    Performed by Tevin Grullon MD at [facility] OR    PERITONEAL CATHETER INSERTION      PERMCATH REWIRE- TUNNELED CATH REWIRE Left 11/13/2017    Performed by Baldev Munroe MD at [facility] CATH LAB    PERMCATH REWIRE- TUNNELED CATH REWIRE N/A 10/5/2017    Performed by Baldev Munroe MD at [facility] CATH LAB    REMOVAL, CATHETER, DIALYSIS, PERITONEAL N/A 3/14/2019    Performed by Servando Solis MD at [facility] OR    UMBILICAL HERNIA REPAIR      Venogram, possible intervention Right 3/20/2019    Performed by BESSIE Wynn III, MD at [facility] CATH LAB       Review of patient's allergies indicates:   Allergen Reactions    Clindamycin Diarrhea    Flagyl [metronidazole hcl]     Metronidazole      Current Facility-Administered Medications   Medication Frequency    atorvastatin tablet 40 mg QHS    calcitRIOL capsule 0.5 mcg Daily    clopidogrel tablet 75 mg Daily    dextrose 50% injection 12.5 g PRN    dextrose 50% injection 25 g PRN    glucagon (human recombinant) injection 1 mg PRN    glucose chewable tablet 16 g PRN    glucose chewable tablet 24 g PRN    heparin (porcine) injection 5,000 Units Q8H    heparin 25,000 units in dextrose 5% (100 units/ml) IV bolus from bag - ADDITIONAL PRN BOLUS - 30 units/kg (max bolus 4000 units) PRN    heparin 25,000 units in dextrose 5% (100 units/ml) IV bolus from bag - ADDITIONAL PRN BOLUS - 60 units/kg (max bolus 4000 units) PRN    heparin 25,000 units in dextrose 5% (100 units/ml) IV bolus from bag INITIAL BOLUS (max bolus 4000 units) Once    heparin 25,000 units in dextrose 5% 250 mL (100 units/mL) infusion LOW INTENSITY nomogram - [facility] Continuous    insulin aspart U-100 pen 0-5 Units QID (AC + HS) PRN    norepinephrine 4 mg in dextrose 5% 250 mL infusion (premix) (titrating) Continuous     pantoprazole EC tablet 40 mg Daily    [START ON 4/13/2019] piperacillin-tazobactam 4.5 g in sodium chloride 0.9% 100 mL IVPB (ready to mix system) Q12H    sevelamer carbonate tablet 800 mg TID WM     Current Outpatient Medications   Medication    amLODIPine (NORVASC) 10 MG tablet    aspirin (ECOTRIN) 81 MG EC tablet    atorvastatin (LIPITOR) 40 MG tablet    calcitRIOL (ROCALTROL) 0.25 MCG Cap    cinacalcet (SENSIPAR) 90 MG Tab    epoetin adonay 10,000 unit/mL Soln 1 mL, epoetin adonay 20,000 unit/mL Soln 1 mL    gabapentin (NEURONTIN) 100 MG capsule    insulin detemir U-100 (LEVEMIR FLEXTOUCH) 100 unit/mL (3 mL) SubQ InPn pen    nateglinide (STARLIX) 120 MG tablet    ONETOUCH VERIO Strp    pantoprazole (PROTONIX) 40 MG tablet    sevelamer carbonate (RENVELA) 800 mg Tab    vitamin renal formula, B-complex-vitamin c-folic acid, (B COMPLEX-C-FOLIC ACID) 1 mg Cap     Facility-Administered Medications Ordered in Other Encounters   Medication Frequency    [MAR Hold - Suspended Admission] 0.9%  NaCl infusion PRN    [MAR Hold - Suspended Admission] 0.9%  NaCl infusion PRN    [MAR Hold - Suspended Admission] acetaminophen tablet 650 mg Q6H PRN    [MAR Hold - Suspended Admission] acetaminophen tablet 650 mg Q4H PRN    [MAR Hold - Suspended Admission] atorvastatin tablet 40 mg QHS    [MAR Hold - Suspended Admission] calcitRIOL capsule 0.5 mcg Daily    [MAR Hold - Suspended Admission] calcium carbonate 200 mg calcium (500 mg) chewable tablet 500 mg BID PRN    [MAR Hold - Suspended Admission] cinacalcet tablet 90 mg Daily with breakfast    [MAR Hold - Suspended Admission] clopidogrel tablet 75 mg Daily    [MAR Hold - Suspended Admission] dextrose 50% injection 12.5 g PRN    [MAR Hold - Suspended Admission] dextrose 50% injection 25 g PRN    [MAR Hold - Suspended Admission] epoetin adonay injection 9,300 Units Every Tues, Thurs, Sat    [MAR Hold - Suspended Admission] gabapentin capsule 100 mg TID    [MAR  Hold - Suspended Admission] glucagon (human recombinant) injection 1 mg PRN    [MAR Hold - Suspended Admission] glucose chewable tablet 16 g PRN    [MAR Hold - Suspended Admission] glucose chewable tablet 24 g PRN    [MAR Hold - Suspended Admission] heparin (porcine) injection 1,000 Units PRN    [MAR Hold - Suspended Admission] heparin (porcine) injection 2,000 Units PRN    [MAR Hold - Suspended Admission] heparin (porcine) injection 2,000 Units PRN    [MAR Hold - Suspended Admission] insulin aspart U-100 pen 0-5 Units QID (AC + HS) PRN    [MAR Hold - Suspended Admission] insulin detemir U-100 pen 10 Units QHS    [MAR Hold - Suspended Admission] loperamide capsule 2 mg QID PRN    [MAR Hold - Suspended Admission] ondansetron disintegrating tablet 4 mg Q8H PRN    [MAR Hold - Suspended Admission] ondansetron disintegrating tablet 8 mg Q8H PRN    [MAR Hold - Suspended Admission] pantoprazole EC tablet 40 mg Daily    [MAR Hold - Suspended Admission] phenyleph-shark radha oil-mo-pet ointment 1 applicator QID PRN    [MAR Hold - Suspended Admission] polyethylene glycol packet 17 g BID PRN    [MAR Hold - Suspended Admission] promethazine tablet 25 mg Q6H PRN    [MAR Hold - Suspended Admission] ramelteon tablet 8 mg Nightly PRN    [MAR Hold - Suspended Admission] senna-docusate 8.6-50 mg per tablet 1 tablet BID    [MAR Hold - Suspended Admission] sevelamer carbonate tablet 800 mg TID WM    [MAR Hold - Suspended Admission] vitamin renal formula (B-complex-vitamin c-folic acid) 1 mg per capsule 1 capsule Daily     Family History     Problem Relation (Age of Onset)    Cancer Maternal Grandmother, Paternal Grandfather    Diabetes Maternal Aunt, Paternal Aunt        Tobacco Use    Smoking status: Never Smoker    Smokeless tobacco: Never Used   Substance and Sexual Activity    Alcohol use: No     Comment: Pt reports some social use of about 1-2 drinks about every six months.    Drug use: No    Sexual activity:  Not Currently     Partners: Male     Birth control/protection: None     Review of Systems   Constitutional: Positive for activity change, fatigue and fever. Negative for chills.   HENT: Negative for congestion, postnasal drip, rhinorrhea, sinus pressure and sinus pain.    Respiratory: Negative for cough, chest tightness, shortness of breath and wheezing.    Cardiovascular: Negative for chest pain, palpitations and leg swelling.   Gastrointestinal: Positive for blood in stool. Negative for abdominal distention, abdominal pain, constipation, diarrhea, nausea and vomiting.   Musculoskeletal: Negative for arthralgias, gait problem, joint swelling and myalgias.   Skin: Negative for color change, pallor and rash.   Neurological: Negative for dizziness, light-headedness and headaches.   Psychiatric/Behavioral: Negative for agitation, behavioral problems and confusion.     Objective:     Vital Signs (Most Recent):  Temp: 97.8 °F (36.6 °C) (04/12/19 0631)  Pulse: 100 (04/12/19 1115)  Resp: 16 (04/12/19 1115)  BP: 100/68 (04/12/19 1115)  SpO2: 100 % (04/12/19 1115)  O2 Device (Oxygen Therapy): nasal cannula (04/12/19 1115) Vital Signs (24h Range):  Temp:  [97.7 °F (36.5 °C)-101.3 °F (38.5 °C)] 97.8 °F (36.6 °C)  Pulse:  [] 100  Resp:  [16-28] 16  SpO2:  [93 %-100 %] 100 %  BP: ()/(48-87) 100/68     Weight: 92.1 kg (203 lb) (04/12/19 0022)  Body mass index is 31.79 kg/m².  Body surface area is 2.09 meters squared.    I/O last 3 completed shifts:  In: 2490 [P.O.:940; IV Piggyback:1550]  Out: -     Physical Exam   Constitutional: She is oriented to person, place, and time. She appears well-developed and well-nourished. No distress.   HENT:   Head: Normocephalic and atraumatic.   Right Ear: External ear normal.   Left Ear: External ear normal.   Eyes: Conjunctivae and EOM are normal. Right eye exhibits no discharge. Left eye exhibits no discharge.   Neck: Normal range of motion. Neck supple.   Cardiovascular: Normal  rate and regular rhythm. Exam reveals no gallop and no friction rub.   No murmur heard.  Pulmonary/Chest: Effort normal and breath sounds normal. No respiratory distress. She has no wheezes. She has no rales.   Abdominal: Soft. Bowel sounds are normal. She exhibits no distension. There is no tenderness.   Musculoskeletal: Normal range of motion. She exhibits no edema or deformity.   Neurological: She is alert and oriented to person, place, and time.   Skin: Skin is warm and dry. She is not diaphoretic.   L femoral tunneled dialysis catheter   Psychiatric: She has a normal mood and affect. Her behavior is normal.       Significant Labs:  ABGs:   Recent Labs   Lab 04/12/19  0752   PH 7.301*   PCO2 47.1*   HCO3 23.2*   POCSATURATED 31*   BE -3     CBC:   Recent Labs   Lab 04/12/19 0222   WBC 15.15*   RBC 2.39*   HGB 7.1*   HCT 24.6*      *   MCH 29.7   MCHC 28.9*     CMP:   Recent Labs   Lab 04/12/19 0222   *   CALCIUM 9.7   ALBUMIN 1.9*   PROT 7.5      K 5.1   CO2 18*   CL 98   BUN 20   CREATININE 4.6*   ALKPHOS 129   ALT 30   AST 99*   BILITOT 0.3           Assessment/Plan:     ESRD (end stage renal disease) on dialysis  ESRD on iHD TTS  [facility]  L femoral tunneled catheter (3/28)    47 yo female well known to our service who has been dialysis dependent for >15 years, who presents from SNF for hypotension and fever after hemodialysis on Thursday.  Nephrology has been consulted for ESRD management while in-patient.    Plan/recommendations:  -No urgent need for RRT at this time, but low threshold for worsening acidosis.  -recommend repeat renal panel this afternoon  -HD vs. SLED (currently on low dose levo, but known history of frequent clotting on hemodialysis)  May need to do HD to prevent clotting of the extracorporeal circuit.  Hx of GI bleed.  -f/u with BC  -Dressing removed from femoral catheter, negative for signs of infection.  No exudate appreciated from tunnel/exit  site.  -recommend treating with Abx and keeping current catheter due to limited vascular options.  If clinical conditions deteriorates or fails to improve with Abx alone, then will consider changing out tunneled catheter.      Andrew Ragsdale NP  Nephrology  [facility]

## 2019-04-12 NOTE — ED NOTES
Pt resting peacefully on cardiac monitor and O2 monitor. Will continue to monitor pt condition frequently. Pt denies any needs at this time. Pt updated and aware of further plan of care.

## 2019-04-12 NOTE — HPI
Ms. Todd is a 49 yo female with CAD s/p 2 PCI (2012), HFrEF 30%, HTN, DM, and ESRD who presented from [facility] for hypotension and fever.  She reports going to dialysis on 4/11 in which 1.7L of fluid was removed.  She had hypotension during the treatment, but remained asymptomatic.  She was transferred back to [facility] afterwards where her blood pressures continued to run low, but patient started becoming symptomatic with chest pressure, lightheadedness and SOB.  Nursing staff at the [facility] were unable to obtain a blood pressure despite IVF bolus and she was then transferred to [facility] for further management.  She had a fever of 101, which resolved with tylenol administration per her account.  In the ED, she was found to have continued hypotension and was started on levo for BP support.  Lactate elevated (>12) and she was bolused with 1.5L of NS.  VBG with pH of 7.3. She denies any fever/chills during dialysis. Nephrology has been consulted for ESRD management while in-patient.    She used to be on home CCPD, admitted to [facility] on 2/21 for peritonitis. PD catheter removed due to hx of recurrent peritonitis and she was transitioned to HD. Bilateral IJ tunneled catheters were attempted without success due to thrombus. Now has a temporary dialysis catheter in her left fem that was placed 3/28 by ALONZO.  Prior to permacath placement, she had a next day graft placed by Dr. Leger on 3/22, but it was never used and was clotted 1 day post-op.  Patient refused to have the graft declotted, so the permacatch was placed at that time.

## 2019-04-12 NOTE — ED NOTES
LOC: Pt is awake, alert, oriented x4. Affect is appropriate. Speech clear and appropriate.      Appearance: Pt uncomfortable. Repeatedly asking to roll over on side and stomach. Repeatedly asking for water.  Pt clean and well groomed.     Skin: Skin warm and dry. Color consistent with ethnicity. Skin turgor normal. Mucus membranes moist. Skin not intact. Has breakdown to buttock pt states is healing. Wound is clean and dry. Appears to be mostly healed.     Musculoskeletal: Pt moving upper and lower extremities without difficulty. Complains of generalized weakness.     Respiratory: Airway open and patent. Respirations spontaneous, even, easy, and unlabored. Pt has normal effort and rate. No accessory muscle use noted. Denies cough. complians of shortness of breath secondary to being inverted at this time.     Cardiovascular: No peripheral edema noted. No complaints of chest pain. S1S2 present. Rate regular. Radial pulses 2+.     Abdominal: Abdomen soft and nontender. No distention noted. Denies n/v. Bowels sounds active x 4 quadrants.     Neurological: Eyes open spontaneously. Behavior appropriate to situation. Follows commands appropriately. Facial expression symmetrical. Purposeful motor response. Sensation intact.

## 2019-04-12 NOTE — ED TRIAGE NOTES
"Jenni Todd, a 48 y.o. female presents to the ED w/ complaint of being hypotensive all day at [facility]. Pt states that the nurses were unable to get a blood pressure reading. EMS stated that they got a blood pressure in the 120 systolic via EMS. Patient denies any SOB or chest at this time. Pt states that she is in discomfort near her sacrum due to being on her back a lot previously in the hospital. Pt seems agitated at this time, moving side to side, and making grunting noise. Pt states that "it comforts me" and not to worry about it. Pt is a dialysis patient receiving dialysis Tuesday/Thursday/Saturday. Pt states that she did receive dialysis via her groin area. Pt has a new shunt to upper right arm, and previously had one on her left upper arm. Pt states that the right new shunt doesn't work.     Triage note:  Chief Complaint   Patient presents with    Hypotension     Hypotension all day at SNF. Fever, given tylenol with relief.      Review of patient's allergies indicates:   Allergen Reactions    Clindamycin Diarrhea    Flagyl [metronidazole hcl]     Metronidazole      Past Medical History:   Diagnosis Date    Abnormal finding on Pap smear, HPV DNA positive 2014    Anemia associated with chronic renal failure     on Epogen    Blood type B+     Bulging discs - symptomatic      CAD (coronary artery disease)     Cardiomyopathy 3/13/2019    Diabetes mellitus, type 2     ESRD (end stage renal disease) 04/20/2004    FSGS (focal segmental glomerulosclerosis)     with collapsing    Hyperlipidemia     Hypertension 2004    Neuropathy     NSTEMI (non-ST elevated myocardial infarction) 3/29/2019    Obesity     Secondary hyperparathyroidism, renal     TIA (transient ischemic attack)     Uterine fibroid     small uterine        "

## 2019-04-12 NOTE — SUBJECTIVE & OBJECTIVE
Past Medical History:   Diagnosis Date    Abnormal finding on Pap smear, HPV DNA positive     Anemia associated with chronic renal failure     on Epogen    Blood type B+     Bulging discs - symptomatic      CAD (coronary artery disease)     Cardiomyopathy 3/13/2019    Diabetes mellitus, type 2     ESRD (end stage renal disease) 2004    FSGS (focal segmental glomerulosclerosis)     with collapsing    Hyperlipidemia     Hypertension     Neuropathy     NSTEMI (non-ST elevated myocardial infarction) 3/29/2019    Obesity     Secondary hyperparathyroidism, renal     TIA (transient ischemic attack)     Uterine fibroid     small uterine        Past Surgical History:   Procedure Laterality Date    CARDIAC CATHETERIZATION      PCI x 2     SECTION, CLASSIC      COLONOSCOPY N/A 2018    Performed by Rodrigue Russell MD at [facility] ENDO (2ND FLR)    DEBRIDEMENT-WOUND Left 2016    Performed by Teressa Maddox MD at [facility] OR    DIALYSIS FISTULA CREATION      multiple fistulas and grafts before PD     EGD (ESOPHAGOGASTRODUODENOSCOPY) N/A 3/14/2019    Performed by Adolph Davila MD at [facility] ENDO    EGD (ESOPHAGOGASTRODUODENOSCOPY) N/A 3/13/2019    Performed by Adolph Davila MD at [facility] ENDO    INCISION AND DRAINAGE (I&D), LABIAL N/A 2016    Performed by Harmony Fernandez MD at [facility] OR    INCISION AND DRAINAGE (I&D), LABIAL (ADD ON) Left 2016    Performed by Teressa Maddox MD at [facility] OR    INCISION AND DRAINAGE OF WOUND Left     VULVAR ABCESS WITH NECROSIS    Insertion, Catheter, Central Venous, Hemodialysis N/A 3/28/2019    Performed by Kimo Weeks MD at [facility] CATH LAB    Insertion, Catheter, Central Venous, Hemodialysis N/A 3/18/2019    Performed by Kimo Weeks MD at [facility] CATH LAB    INSERTION, CATHETER, TUNNELED ABORTED Left 3/14/2019    Performed by Servando Solis MD at [facility] OR    INSERTION, GRAFT, ARTERIOVENOUS, RIGHT ARM Right  3/22/2019    Performed by BESSIE Wnyn III, MD at [facility] OR 2ND FLR    ORIF, HIP Left 9/13/2018    Performed by Tevin Grullon MD at [facility] OR    PERITONEAL CATHETER INSERTION      PERMCATH REWIRE- TUNNELED CATH REWIRE Left 11/13/2017    Performed by Baldev Munroe MD at [facility] CATH LAB    PERMCATH REWIRE- TUNNELED CATH REWIRE N/A 10/5/2017    Performed by Baldev Munroe MD at [facility] CATH LAB    REMOVAL, CATHETER, DIALYSIS, PERITONEAL N/A 3/14/2019    Performed by Servando Solis MD at [facility] OR    UMBILICAL HERNIA REPAIR      Venogram, possible intervention Right 3/20/2019    Performed by BESSIE Wynn III, MD at [facility] CATH LAB       Review of patient's allergies indicates:   Allergen Reactions    Clindamycin Diarrhea    Flagyl [metronidazole hcl]     Metronidazole        Family History     Problem Relation (Age of Onset)    Cancer Maternal Grandmother, Paternal Grandfather    Diabetes Maternal Aunt, Paternal Aunt        Tobacco Use    Smoking status: Never Smoker    Smokeless tobacco: Never Used   Substance and Sexual Activity    Alcohol use: No     Comment: Pt reports some social use of about 1-2 drinks about every six months.    Drug use: No    Sexual activity: Not Currently     Partners: Male     Birth control/protection: None      Review of Systems   Constitutional: Positive for fever. Negative for chills and fatigue.   HENT: Negative for congestion.    Respiratory: Negative for chest tightness and shortness of breath.    Cardiovascular: Negative for chest pain, palpitations and leg swelling.   Gastrointestinal: Negative for abdominal pain.   Genitourinary: Positive for decreased urine volume. Negative for pelvic pain.   Musculoskeletal: Negative for back pain.   Skin: Negative for color change, pallor, rash and wound.   Neurological: Positive for light-headedness. Negative for dizziness.   Psychiatric/Behavioral: Negative for agitation.     Objective:     Vital Signs (Most Recent):  Temp:  97.7 °F (36.5 °C) (04/12/19 0446)  Pulse: 104 (04/12/19 0546)  Resp: 19 (04/12/19 0532)  BP: 102/72 (04/12/19 0546)  SpO2: 100 % (04/12/19 0546) Vital Signs (24h Range):  Temp:  [97.7 °F (36.5 °C)-101.3 °F (38.5 °C)] 97.7 °F (36.5 °C)  Pulse:  [] 104  Resp:  [16-28] 19  SpO2:  [93 %-100 %] 100 %  BP: ()/(48-72) 102/72   Weight: 92.1 kg (203 lb)  Body mass index is 31.79 kg/m².      Intake/Output Summary (Last 24 hours) at 4/12/2019 0611  Last data filed at 4/12/2019 0505  Gross per 24 hour   Intake 1770 ml   Output --   Net 1770 ml       Physical Exam   Constitutional: She appears well-developed and well-nourished. No distress.   HENT:   Head: Normocephalic and atraumatic.   Eyes: Pupils are equal, round, and reactive to light. EOM are normal.   Neck: Normal range of motion. Neck supple. No JVD present.   Cardiovascular: Normal rate, regular rhythm and normal heart sounds.   Pulmonary/Chest: Effort normal and breath sounds normal. No respiratory distress.   Abdominal: Soft. Bowel sounds are normal. She exhibits distension.   Musculoskeletal: She exhibits no edema.   Right femoral site clean dry and intact no erythema or tenderness   Neurological: She is alert.   Psychiatric: She has a normal mood and affect.       Vents:     Lines/Drains/Airways     Central Venous Catheter Line                 Hemodialysis Catheter 03/28/19 1528 left femoral 14 days          Drain                 Hemodialysis AV Fistula Left upper arm -- days         Hemodialysis AV Graft Right upper arm -- days          Peripheral Intravenous Line                 Peripheral IV - Single Lumen 04/12/19 0155 22 G Right Hand less than 1 day         Peripheral IV - Single Lumen 04/12/19 0300 20 G Left Forearm less than 1 day              Significant Labs:    CBC/Anemia Profile:  Recent Labs   Lab 04/12/19  0222   WBC 15.15*   HGB 7.1*   HCT 24.6*      *   RDW 17.6*        Chemistries:  Recent Labs   Lab 04/11/19  0583  04/12/19 0222    139   K 4.8 5.1    98   CO2 25 18*   BUN 34* 20   CREATININE 7.2* 4.6*   CALCIUM 9.6 9.7   ALBUMIN 1.8* 1.9*   PROT  --  7.5   BILITOT  --  0.3   ALKPHOS  --  129   ALT  --  30   AST  --  99*   MG  --  1.8   PHOS 5.0*  --        Lactic Acid:   Recent Labs   Lab 04/12/19 0222 04/12/19 0433   LACTATE >12.0* 6.2*       Significant Imaging: I have reviewed all pertinent imaging results/findings within the past 24 hours.  I have reviewed and interpreted all pertinent imaging results/findings within the past 24 hours.

## 2019-04-12 NOTE — ED NOTES
PRESSURE BAG APPLIED TO NORMAL SALINE. MD AWARE OF NEW BLOOD PRESSURE. PER DR. CARRASCO MOVE PATIENT TO BPOD TO BE CLOSER TO MD. CHARGE NURSE AWARE.

## 2019-04-12 NOTE — Clinical Note
Catheter is inserted into the ostium   right coronary artery. Angiography performed of the right coronary arteries.

## 2019-04-12 NOTE — ED PROVIDER NOTES
"Encounter Date: 2019       History     Chief Complaint   Patient presents with    Hypotension     Hypotension all day at SNF. Fever, given tylenol with relief.      HPI     48 year old female PMH ESRD (HD MWF, anuric), CAD s/p 2 stents presents from SNF for hypotension. Patient discharged to SNF after recent admission for peritonitis. Patient was doing home PD but had to remove PD cath after becoming septic from peritonitis. Attempts to place tunneled IJ catheters and grafts failed. Left femoral catheter placed for HD which patient has been completing at [facility]. Last HD today, took off 1.5 L. Patient reports BP had been low all day since HD, approximately 80 systolic. Her only complaints is "a funny feeling in my center chest" that is not tightness, pressure, or pain. She denies SOB, cough, nausea, vomiting, abdominal pain, rash. She was found to have fever this AM at HD, resolved with Tylenol. Patient denies feelign sick or feverish.     Review of patient's allergies indicates:   Allergen Reactions    Clindamycin Diarrhea    Flagyl [metronidazole hcl]     Metronidazole      Past Medical History:   Diagnosis Date    Abnormal finding on Pap smear, HPV DNA positive     Anemia associated with chronic renal failure     on Epogen    Blood type B+     Bulging discs - symptomatic      CAD (coronary artery disease)     Cardiomyopathy 3/13/2019    Diabetes mellitus, type 2     ESRD (end stage renal disease) 2004    FSGS (focal segmental glomerulosclerosis)     with collapsing    Hyperlipidemia     Hypertension     Neuropathy     NSTEMI (non-ST elevated myocardial infarction) 3/29/2019    Obesity     Secondary hyperparathyroidism, renal     TIA (transient ischemic attack)     Uterine fibroid     small uterine      Past Surgical History:   Procedure Laterality Date    CARDIAC CATHETERIZATION      PCI x 2     SECTION, CLASSIC      COLONOSCOPY N/A 2018    Performed by Rodrigue" TRAN Russell MD at [facility] ENDO (2ND FLR)    DEBRIDEMENT-WOUND Left 4/18/2016    Performed by [facility]ressa Maddox MD at [facility] OR    DIALYSIS FISTULA CREATION      multiple fistulas and grafts before PD     EGD (ESOPHAGOGASTRODUODENOSCOPY) N/A 3/14/2019    Performed by Adolph Davila MD at [facility] ENDO    EGD (ESOPHAGOGASTRODUODENOSCOPY) N/A 3/13/2019    Performed by Adolph Davila MD at [facility] ENDO    INCISION AND DRAINAGE (I&D), LABIAL N/A 4/17/2016    Performed by Harmony Fernandez MD at [facility] OR    INCISION AND DRAINAGE (I&D), LABIAL (ADD ON) Left 4/18/2016    Performed by Teressa Maddox MD at [facility] OR    INCISION AND DRAINAGE OF WOUND Left 2016    VULVAR ABCESS WITH NECROSIS    Insertion, Catheter, Central Venous, Hemodialysis N/A 3/28/2019    Performed by Kimo Weeks MD at [facility] CATH LAB    Insertion, Catheter, Central Venous, Hemodialysis N/A 3/18/2019    Performed by Kimo Weeks MD at [facility] CATH LAB    INSERTION, CATHETER, TUNNELED ABORTED Left 3/14/2019    Performed by Servando Solis MD at [facility] OR    INSERTION, GRAFT, ARTERIOVENOUS, RIGHT ARM Right 3/22/2019    Performed by BESSIE Wynn III, MD at [facility] OR 2ND FLR    ORIF, HIP Left 9/13/2018    Performed by Tevin Grullon MD at [facility] OR    PERITONEAL CATHETER INSERTION      PERMCATH REWIRE- TUNNELED CATH REWIRE Left 11/13/2017    Performed by Baldev Munroe MD at [facility] CATH LAB    PERMCATH REWIRE- TUNNELED CATH REWIRE N/A 10/5/2017    Performed by Baldev Munroe MD at [facility] CATH LAB    REMOVAL, CATHETER, DIALYSIS, PERITONEAL N/A 3/14/2019    Performed by Servando Solis MD at [facility] OR    UMBILICAL HERNIA REPAIR      Venogram, possible intervention Right 3/20/2019    Performed by BESSIE Wynn III, MD at [facility] CATH LAB     Family History   Problem Relation Age of Onset    Cancer Maternal Grandmother     Cancer Paternal Grandfather     Diabetes Maternal Aunt     Diabetes Paternal Aunt     Kidney disease Neg Hx      Heart disease Neg Hx     Ovarian cancer Neg Hx     Breast cancer Neg Hx      Social History     Tobacco Use    Smoking status: Never Smoker    Smokeless tobacco: Never Used   Substance Use Topics    Alcohol use: No     Comment: Pt reports some social use of about 1-2 drinks about every six months.    Drug use: No     Review of Systems    Physical Exam     Initial Vitals [04/12/19 0022]   BP Pulse Resp Temp SpO2   126/72 (!) 114 (!) 28 -- 98 %      MAP       --         Physical Exam    Nursing note and vitals reviewed.  Constitutional: She appears well-developed and well-nourished. She is not diaphoretic. No distress.   HENT:   Head: Normocephalic and atraumatic.   Mouth/Throat: Oropharynx is clear and moist.   Eyes: Conjunctivae and EOM are normal. Pupils are equal, round, and reactive to light.   Neck: Normal range of motion. Neck supple.   Cardiovascular: Normal rate, regular rhythm, normal heart sounds and intact distal pulses.   Pulmonary/Chest: Breath sounds normal. No respiratory distress. She has no wheezes. She has no rhonchi. She has no rales. She exhibits no tenderness.   Abdominal: Bowel sounds are normal. She exhibits distension. She exhibits no mass. There is no tenderness. There is no rebound and no guarding.   Abdomen minimally distended and firm   Musculoskeletal: She exhibits no edema or tenderness.        Legs:  Neurological: She is alert and oriented to person, place, and time. She has normal strength. No cranial nerve deficit or sensory deficit.   Skin: Skin is warm. No rash noted. No erythema. No pallor.   Psychiatric: She has a normal mood and affect. Her behavior is normal. Judgment and thought content normal.         ED Course   Procedures  Labs Reviewed   CBC W/ AUTO DIFFERENTIAL - Abnormal; Notable for the following components:       Result Value    WBC 15.15 (*)     RBC 2.39 (*)     Hemoglobin 7.1 (*)     Hematocrit 24.6 (*)      (*)     MCHC 28.9 (*)     RDW 17.6 (*)      Immature Granulocytes 1.5 (*)     Gran # (ANC) 10.0 (*)     Immature Grans (Abs) 0.22 (*)     All other components within normal limits   COMPREHENSIVE METABOLIC PANEL - Abnormal; Notable for the following components:    CO2 18 (*)     Glucose 160 (*)     Creatinine 4.6 (*)     Albumin 1.9 (*)     AST 99 (*)     Anion Gap 23 (*)     eGFR if  12.1 (*)     eGFR if non  10.5 (*)     All other components within normal limits   LACTIC ACID, PLASMA - Abnormal; Notable for the following components:    Lactate (Lactic Acid) >12.0 (*)     All other components within normal limits   TSH - Abnormal; Notable for the following components:    TSH 7.269 (*)     All other components within normal limits   TROPONIN I - Abnormal; Notable for the following components:    Troponin I 3.906 (*)     All other components within normal limits   CULTURE, BLOOD   CULTURE, BLOOD   MAGNESIUM   T4, FREE   LACTIC ACID, PLASMA   T4, FREE   TROPONIN I          Imaging Results    None           48 year old patient with extensive PMH as above presents with hypotension, fever in setting of recent peritonitis and CVC placement. Patient mentating appropriately, denying lightheadedness, chest pain, or feeling ill. Suspect sepsis 2/2 peritonitis vs indwelling catheter. Empiric cefepime given. After lactic acid and WBC have come back significantly elevated, broadened out to Vanc and Zosyn (allergy to Flagyl). IVF give judiciously given anuric, starting with boluses of 500 cc and reassessing. Patient consulted to MICU. Will discuss placing central line for BP control vs preserving central vessels. Strong suspicion for source of infection being line, would like to remove after new access established. Trop elevated above baseline despite HD today. EKG shows ST depressions in leads I, aVL with elevations in aVF, however these same changes seen on previous EKGs. Concerned for NSTEMI, likely Grade II from hypotension. TSH elevated  to 7.2, free T4 pending. Hypothyroidism possibly contributing to hypotension. Notably, patient had issues with hypotension on last admission and antihypertensives were discontinued.     Millie Contreras MD  [facility]   3:56 AM                   [unfilled]        Clinical Impression:       ICD-10-CM ICD-9-CM   1. Septic shock A41.9 038.9    R65.21 785.52     995.92   2. Hypotension I95.9 458.9   3. Fever, unspecified fever cause R50.9 780.60   4. ESRD (end stage renal disease) N18.6 585.6   5. Vasculopathy I99.9 459.9   6. Central venous line infection, initial encounter T80.219A 999.31   7. NSTEMI (non-ST elevated myocardial infarction) I21.4 410.70                                Millie Contreras MD  Resident  04/12/19 0443       Millie Contreras MD  Resident  04/12/19 0444

## 2019-04-12 NOTE — ASSESSMENT & PLAN NOTE
- patient with multiple problems with access  - day team to discuss removing this line as she needs one for dialysis but has poor access  - has peripherals for now and levophed running through that  - site of femoral line is non tender but need to rule out infection and follow up blood cultures

## 2019-04-12 NOTE — ASSESSMENT & PLAN NOTE
ESRD on iHD TTS  C-Deckbar  L femoral tunneled catheter (3/28)    49 yo female well known to our service who has been dialysis dependent for >15 years, who presents from SNF for hypotension and fever after hemodialysis on Thursday.  Nephrology has been consulted for ESRD management while in-patient.    Plan/recommendations:  -No urgent need for RRT at this time, but low threshold for worsening acidosis.  -recommend repeat renal panel this afternoon  -HD vs. SLED (currently on low dose levo, but known history of frequent clotting on hemodialysis)  May need to do HD to prevent clotting of the extracorporeal circuit.  Hx of GI bleed.  -f/u with BC  -Dressing removed from femoral catheter, negative for signs of infection.  No exudate appreciated from tunnel/exit site.  -recommend treating with Abx and keeping current catheter due to limited vascular options.  If clinical conditions deteriorates or fails to improve with Abx alone, then will consider changing out tunneled catheter.

## 2019-04-13 NOTE — PROGRESS NOTES
"[facility]  Critical Care Medicine  Progress Note    Patient Name: Jenni Todd  MRN: [id number]  Admission Date: 4/12/2019  Hospital Length of Stay: 1 days  Code Status: Full Code  Attending Provider: Mandeep Angeles*  Primary Care Provider: Michael Tan Iii, MD   Principal Problem: Septic shock    Subjective:     HPI:  Ms. Jenni Todd is a 48 y.o. female w/ CAD s/p 2 PCI (2012), HFrEF 30%, HTN, DM, and ESRD who presented from SNF for hypotension and fever. Admitted to the MICU for septic Shock. Patient earlier today was getting dialysis and her blood pressure was low. She was also noted to have a fever of 101 and was sent to the ED. Per patient she nanci good with no complaints. She denies any cough, nausea, vomiting or abdominal pain , she complained of feeling dizzy. This was her first episode of fever. Patient stated that she was recently discharged a week ago to [facility] after developing  peritonitis. She used to do PD but switched to regular HD after peritonitis. PD cath after becoming septic from peritonitis. Attempts to place tunneled IJ catheters and grafts failed. She has a left femoral catheter placed for HD . Last HD today, took off 1.5 L. Patient reports BP had been low all day since HD, approximately 80 systolic. Her only complaints is "a funny feeling in my center chest" that is not tightness, pressure, or pain.     In the ED patient found hypotensive with lactate > 12.     Per last discharge summary:    used to be on home PD admitted to [facility] on 2/21 for peritonitis. PD catheter removed. Due to hx of recurrent peritonitis she was transitioned to HD. Bilateral IJ tunneled catheters were attempted without success. Now has a temporary dialysis catheter in her left fem. Transferred to [facility] for vascular. Nephrology access team attempted a tunneled line and were unsuccessful. Vascular surgery consulted for HD access. Hospital course complicated by GIB with low " risk ulcer. S/p OR 3/22 per vascular for RUE graft (Arsen). 3/28 groin perm-cath placed. NSTEMI 3/29.       Hospital/ICU Course:  Admitted to MICU  for septic shock of unclear etiology, possibly 2/2 peritonitis vs femoral tunneled cath. Patient also found to have NSTEMI, likely type 2 and started on heparin, ASA, Plavix, lipitor. Evaluated by interventional cardiology and recommend continue tx for NSTEMI and out pt ischemic eval when more stable. Pt started on broad spectrum abx on admission with vanc and zosyn. BCx remain negative. Femoral line does not appear infection on exam, unlikely source of infection, will maintain line.    Interval History: NAEON. Patient is feeling well this AM and has no complaints. She received 1 u pRBCs overnight per cards recommendation and is no longer requiring levo. She receives TTS dialysis normally, no dialysis done yesterday, nephrology planning on SLED today.      Past Surgical History:   Procedure Laterality Date    CARDIAC CATHETERIZATION      PCI x 2     SECTION, CLASSIC      COLONOSCOPY N/A 2018    Performed by Rodrigue Russell MD at [facility] ENDO (2ND FLR)    DEBRIDEMENT-WOUND Left 2016    Performed by Teressa Maddox MD at [facility] OR    DIALYSIS FISTULA CREATION      multiple fistulas and grafts before PD     EGD (ESOPHAGOGASTRODUODENOSCOPY) N/A 3/14/2019    Performed by Adolph Davila MD at [facility] ENDO    EGD (ESOPHAGOGASTRODUODENOSCOPY) N/A 3/13/2019    Performed by Adolph Davila MD at [facility] ENDO    INCISION AND DRAINAGE (I&D), LABIAL N/A 2016    Performed by Harmony Fernandez MD at [facility] OR    INCISION AND DRAINAGE (I&D), LABIAL (ADD ON) Left 2016    Performed by Teressa Maddox MD at [facility] OR    INCISION AND DRAINAGE OF WOUND Left     VULVAR ABCESS WITH NECROSIS    Insertion, Catheter, Central Venous, Hemodialysis N/A 3/28/2019    Performed by Kimo Weeks MD at [facility] CATH LAB    Insertion, Catheter, Central  Venous, Hemodialysis N/A 3/18/2019    Performed by Kimo Weeks MD at [facility] CATH LAB    INSERTION, CATHETER, TUNNELED ABORTED Left 3/14/2019    Performed by Servando Solis MD at [facility] OR    INSERTION, GRAFT, ARTERIOVENOUS, RIGHT ARM Right 3/22/2019    Performed by BESSIE Wynn III, MD at [facility] OR 2ND FLR    ORIF, HIP Left 9/13/2018    Performed by Tevin Grullon MD at [facility] OR    PERITONEAL CATHETER INSERTION      PERMCATH REWIRE- TUNNELED CATH REWIRE Left 11/13/2017    Performed by Baldev Munroe MD at [facility] CATH LAB    PERMCATH REWIRE- TUNNELED CATH REWIRE N/A 10/5/2017    Performed by Baldev Munroe MD at [facility] CATH LAB    REMOVAL, CATHETER, DIALYSIS, PERITONEAL N/A 3/14/2019    Performed by Servando Solis MD at [facility] OR    UMBILICAL HERNIA REPAIR      Venogram, possible intervention Right 3/20/2019    Performed by BESSIE Wynn III, MD at [facility] CATH LAB       Review of patient's allergies indicates:   Allergen Reactions    Clindamycin Diarrhea    Flagyl [metronidazole hcl]     Metronidazole        Family History     Problem Relation (Age of Onset)    Cancer Maternal Grandmother, Paternal Grandfather    Diabetes Maternal Aunt, Paternal Aunt        Tobacco Use    Smoking status: Never Smoker    Smokeless tobacco: Never Used   Substance and Sexual Activity    Alcohol use: No     Comment: Pt reports some social use of about 1-2 drinks about every six months.    Drug use: No    Sexual activity: Not Currently     Partners: Male     Birth control/protection: None      Review of Systems   Constitutional: Negative for chills, fatigue and fever.   HENT: Negative for congestion.    Respiratory: Negative for chest tightness and shortness of breath.    Cardiovascular: Negative for chest pain, palpitations and leg swelling.   Gastrointestinal: Negative for abdominal pain.   Genitourinary: Negative for pelvic pain.   Musculoskeletal: Negative for back pain.   Skin: Negative for color change,  pallor, rash and wound.   Neurological: Negative for dizziness and light-headedness.   Psychiatric/Behavioral: Negative for agitation.     Objective:     Vital Signs (Most Recent):  Temp: 99.8 °F (37.7 °C) (04/13/19 0730)  Pulse: (!) 116 (04/13/19 0826)  Resp: 19 (04/13/19 0826)  BP: 107/78 (04/13/19 0730)  SpO2: 97 % (04/13/19 0826) Vital Signs (24h Range):  Temp:  [98.4 °F (36.9 °C)-99.8 °F (37.7 °C)] 99.8 °F (37.7 °C)  Pulse:  [] 116  Resp:  [13-30] 19  SpO2:  [90 %-100 %] 97 %  BP: ()/(50-83) 107/78   Weight: 97.3 kg (214 lb 8.1 oz)  Body mass index is 33.6 kg/m².      Intake/Output Summary (Last 24 hours) at 4/13/2019 0845  Last data filed at 4/13/2019 0700  Gross per 24 hour   Intake 304.22 ml   Output --   Net 304.22 ml       Physical Exam   Constitutional: She appears well-developed and well-nourished. No distress.   HENT:   Head: Normocephalic and atraumatic.   Eyes: Pupils are equal, round, and reactive to light. EOM are normal.   Neck: Normal range of motion. Neck supple. No JVD present.   Cardiovascular: Normal rate, regular rhythm and normal heart sounds.   Pulmonary/Chest: Effort normal and breath sounds normal. No respiratory distress.   Abdominal: Soft. Bowel sounds are normal. She exhibits distension.   Musculoskeletal: She exhibits no edema.   Right femoral site clean dry and intact no erythema or tenderness   Neurological: She is alert.   Psychiatric: She has a normal mood and affect.       Vents:     Lines/Drains/Airways     Central Venous Catheter Line                 Hemodialysis Catheter 03/28/19 1528 left femoral 15 days          Drain                 Hemodialysis AV Fistula Left upper arm -- days         Hemodialysis AV Graft Right upper arm -- days          Peripheral Intravenous Line                 Peripheral IV - Single Lumen 04/12/19 0155 22 G Right Hand 1 day         Peripheral IV - Single Lumen 04/12/19 0300 20 G Left Forearm 1 day              Significant  Labs:    CBC/Anemia Profile:  Recent Labs   Lab 04/12/19  0222 04/13/19  0445   WBC 15.15* 12.23   HGB 7.1* 7.8*   HCT 24.6* 25.1*    179   * 97   RDW 17.6* 17.1*        Chemistries:  Recent Labs   Lab 04/12/19  0222 04/12/19  1255 04/12/19  1723 04/13/19  0642    135* 131*  --    K 5.1 4.3 4.8  --    CL 98 96 94*  --    CO2 18* 24 23  --    BUN 20 29* 26*  --    CREATININE 4.6* 5.6* 5.1*  --    CALCIUM 9.7 10.0 9.5  --    ALBUMIN 1.9* 1.8* 1.7* 1.4*   PROT 7.5  --   --  5.4*   BILITOT 0.3  --   --  0.1   ALKPHOS 129  --   --  96   ALT 30  --   --  301*   AST 99*  --   --  354*   MG 1.8  --   --   --    PHOS  --  7.0* 7.8*  --        Lactic Acid:   Recent Labs   Lab 04/12/19  1255 04/12/19  2128 04/13/19  0445   LACTATE 1.7 1.9 2.3*       Significant Imaging: I have reviewed all pertinent imaging results/findings within the past 24 hours.  I have reviewed and interpreted all pertinent imaging results/findings within the past 24 hours.      ABG  Recent Labs   Lab 04/12/19  0752   PH 7.301*   PO2 22*   PCO2 47.1*   HCO3 23.2*   BE -3     Assessment/Plan:     Cardiac/Vascular  CAD (coronary artery disease)  - continue with plavix , heparin ggt, ASA, Lipitor  - interventional cardiology evaluated patient, planning on out patient ischemic evaluation  - echo ordered    Renal/  ESRD (end stage renal disease) on dialysis  -at home gets dialysis TTS  -Nephrology consulted, appreciate recomendations   ----femoral tunneled cath dose not appear infected, will maintain line due to limited vascular options  ----if pt deteriorates or fails to improve with Abx, then will consider changing out tunneled catheter  ----planning on SLED today, may required resumption of levo for dialysis     Anemia in chronic kidney disease, on chronic dialysis  - continue Epogen per nephrology  - Hb 7.1 yesterday, 7.8 today  - received 1 u PRBCs overnight per cards recomendation    ID  * Septic shock  -Patient admitted for  hypotension with lactic acid > 12  -Source likely bacteremia / peritonitis /pneumonia /line . However she has no other symptoms of cough or SOB   - CXR with increased opacity in the Left lobe   - Was given cefepime, vancomycin , and zosyn in the ED.  She also received a total of 1.5L     Plan   - Continue broad spectrum antibiotics with vancomycin and zosyn  (start date 4/12/2019)  - LA and WBC has trended down  - Continue pressors and wean off goal MAP >65; pressors discontinued 4/13, may need to be resumed with dialysis  - Will maitain femoral line as this dose not appear to be the source of infection  - BCx 4/12 NGTD           Critical Care Daily Checklist:    A: Awake: RASS Goal/Actual Goal: RASS Goal: 0-->alert and calm  Actual: Barney Agitation Sedation Scale (RASS): Alert and calm   B: Spontaneous Breathing Trial Performed?     C: SAT & SBT Coordinated?                        D: Delirium: CAM-ICU Overall CAM-ICU: Negative   E: Early Mobility Performed? Yes   F: Feeding Goal:    Status:     Current Diet Order   Procedures    Diet diabetic [facility]; 2000 Calorie; Low Potassium     Order Specific Question:   Indicate patient location for additional diet options:     Answer:   [facility]     Order Specific Question:   Total calories:     Answer:   2000 Calorie     Order Specific Question:   Additional Diet Options:     Answer:   Low Potassium      AS: Analgesia/Sedation none   T: Thromboembolic Prophylaxis heparin   H: HOB > 300 No   U: Stress Ulcer Prophylaxis (if needed)    G: Glucose Control    B: Bowel Function     I: Indwelling Catheter (Lines & Wiggins) Necessity Tunneled femoral cath, peripheral IV x2   D: De-escalation of Antimicrobials/Pharmacotherapies Vanc, zosyn    Plan for the day/ETD SLED    Code Status:  Family/Goals of Care: Full Code         Critical secondary to Patient has a condition that poses threat to life and bodily function: septic shock     Critical care was time spent  personally by me on the following activities: development of treatment plan with patient or surrogate and bedside caregivers, discussions with consultants, evaluation of patient's response to treatment, examination of patient, ordering and performing treatments and interventions, ordering and review of laboratory studies, ordering and review of radiographic studies, pulse oximetry, re-evaluation of patient's condition. This critical care time did not overlap with that of any other provider or involve time for any procedures.     Merly Doshi MD  Critical Care Medicine  [facility]

## 2019-04-13 NOTE — PROGRESS NOTES
[facility]  Nephrology  Progress Note    Patient Name: Jenni Todd  MRN: [id number]  Admission Date: 4/12/2019  Hospital Length of Stay: 1 days  Attending Provider: Mandeep Angeles*   Primary Care Physician: Michael Tan Iii, MD  Principal Problem:Septic shock    Subjective:     HPI: Ms. Todd is a 49 yo female with CAD s/p 2 PCI (2012), HFrEF 30%, HTN, DM, and ESRD who presented from [facility] for hypotension and fever.  She reports going to dialysis on 4/11 in which 1.7L of fluid was removed.  She had hypotension during the treatment, but remained asymptomatic.  She was transferred back to [facility] afterwards where her blood pressures continued to run low, but patient started becoming symptomatic with chest pressure, lightheadedness and SOB.  Nursing staff at the [facility] were unable to obtain a blood pressure despite IVF bolus and she was then transferred to [facility] for further management.  She had a fever of 101, which resolved with tylenol administration per her account.  In the ED, she was found to have continued hypotension and was started on levo for BP support.  Lactate elevated (>12) and she was bolused with 1.5L of NS.  VBG with pH of 7.3. She denies any fever/chills during dialysis. Nephrology has been consulted for ESRD management while in-patient.    She used to be on home CCPD, admitted to [facility] on 2/21 for peritonitis. PD catheter removed due to hx of recurrent peritonitis and she was transitioned to HD. Bilateral IJ tunneled catheters were attempted without success due to thrombus. Now has a temporary dialysis catheter in her left fem that was placed 3/28 by ALONZO.  Prior to permacath placement, she had a next day graft placed by Dr. Leger on 3/22, but it was never used and was clotted 1 day post-op.  Patient refused to have the graft declotted, so the permacatch was placed at that time.          Interval History:   Patient evaluated at bedside, current has been wean of  pressors. With oxygen requirement via nasal canula. NET positive 2 L     Review of patient's allergies indicates:   Allergen Reactions    Clindamycin Diarrhea    Flagyl [metronidazole hcl]     Metronidazole      Current Facility-Administered Medications   Medication Frequency    0.9%  NaCl infusion (CRRT USE ONLY) Continuous    0.9%  NaCl infusion (for blood administration) Q24H PRN    aspirin EC tablet 81 mg Daily    atorvastatin tablet 40 mg QHS    calcitRIOL capsule 0.5 mcg Daily    clopidogrel tablet 75 mg Daily    dextrose 50% injection 12.5 g PRN    dextrose 50% injection 25 g PRN    glucagon (human recombinant) injection 1 mg PRN    glucose chewable tablet 16 g PRN    glucose chewable tablet 24 g PRN    heparin 25,000 units in dextrose 5% (100 units/ml) IV bolus from bag - ADDITIONAL PRN BOLUS - 30 units/kg (max bolus 4000 units) PRN    heparin 25,000 units in dextrose 5% (100 units/ml) IV bolus from bag - ADDITIONAL PRN BOLUS - 60 units/kg (max bolus 4000 units) PRN    heparin 25,000 units in dextrose 5% 250 mL (100 units/mL) infusion LOW INTENSITY nomogram - [facility] Continuous    insulin aspart U-100 pen 0-5 Units QID (AC + HS) PRN    magnesium sulfate 2g in water 50mL IVPB (premix) PRN    norepinephrine 4 mg in dextrose 5% 250 mL infusion (premix) (titrating) Continuous    pantoprazole EC tablet 40 mg Daily    piperacillin-tazobactam 4.5 g in sodium chloride 0.9% 100 mL IVPB (ready to mix system) Q12H    sevelamer carbonate tablet 800 mg TID WM       Objective:     Vital Signs (Most Recent):  Temp: 100.1 °F (37.8 °C) (04/13/19 1100)  Pulse: (!) 119 (04/13/19 1200)  Resp: (!) 26 (04/13/19 1200)  BP: 104/75 (04/13/19 1200)  SpO2: 100 % (04/13/19 1200)  O2 Device (Oxygen Therapy): nasal cannula (04/13/19 1200) Vital Signs (24h Range):  Temp:  [98.4 °F (36.9 °C)-100.1 °F (37.8 °C)] 100.1 °F (37.8 °C)  Pulse:  [] 119  Resp:  [13-36] 26  SpO2:  [90 %-100 %] 100 %  BP: ()/(50-83)  104/75     Weight: 97.3 kg (214 lb 8.1 oz) (04/12/19 1900)  Body mass index is 33.6 kg/m².  Body surface area is 2.14 meters squared.    I/O last 3 completed shifts:  In: 1954.2 [P.O.:50; I.V.:210.9; Blood:3.3; IV Piggyback:1690]  Out: -     Physical Exam   Constitutional: She is oriented to person, place, and time. She appears well-developed and well-nourished. No distress.   HENT:   Head: Normocephalic and atraumatic.   Right Ear: External ear normal.   Left Ear: External ear normal.   Eyes: Conjunctivae and EOM are normal. Right eye exhibits no discharge. Left eye exhibits no discharge.   Neck: Normal range of motion. Neck supple.   Cardiovascular: Normal rate and regular rhythm. Exam reveals no gallop and no friction rub.   No murmur heard.  Pulmonary/Chest: Effort normal and breath sounds normal. No respiratory distress. She has no wheezes. She has no rales.   Abdominal: Soft. Bowel sounds are normal. She exhibits no distension. There is no tenderness.   Musculoskeletal: Normal range of motion. She exhibits no edema or deformity.   Neurological: She is alert and oriented to person, place, and time.   Skin: Skin is warm and dry. She is not diaphoretic.   L femoral tunneled dialysis catheter   Psychiatric: She has a normal mood and affect. Her behavior is normal.       Significant Labs:  ABGs:   Recent Labs   Lab 04/12/19  0752   PH 7.301*   PCO2 47.1*   HCO3 23.2*   POCSATURATED 31*   BE -3     BMP:   Recent Labs   Lab 04/13/19  0642 04/13/19  1016   * 188*    97   CO2 17* 24   BUN 25* 32*   CREATININE 4.4* 6.0*   CALCIUM 7.4* 9.5   MG 1.3*  --      CBC:   Recent Labs   Lab 04/13/19  0445   WBC 12.23   RBC 2.60*   HGB 7.8*   HCT 25.1*      MCV 97   MCH 30.0   MCHC 31.1*     CMP:   Recent Labs   Lab 04/13/19  0642 04/13/19  1016   * 188*   CALCIUM 7.4* 9.5   ALBUMIN 1.4*  1.4* 1.8*   PROT 5.4*  5.4*  --     137   K 3.9 4.7   CO2 17* 24    97   BUN 25* 32*   CREATININE 4.4*  6.0*   ALKPHOS 96  96  --    *  301*  --    *  354*  --    BILITOT 0.1  0.1  --      All labs within the past 24 hours have been reviewed.       Assessment/Plan:     ESRD (end stage renal disease) on dialysis  ESRD on iHD TTS  [facility]  L femoral tunneled catheter (3/28)    47 yo female well known to our service who has been dialysis dependent for >15 years, who presents from SNF for hypotension and fever after hemodialysis on Thursday.  Nephrology has been consulted for ESRD management while in-patient.    Plan/recommendations:  -Will schedule for SLED treatment today in which has been wean of levophed but blood pressures continue to be boarder line in which most likely will require during treatment   -Dressing removed from femoral catheter, negative for signs of infection.  No exudate appreciated from tunnel/exit site.  -recommend treating with Abx and keeping current catheter due to limited vascular options.  If clinical conditions deteriorates or fails to improve with Abx alone, then will consider changing out tunneled catheter.        Lux Estes  Nephrology  Fellow  [facility]    Pager [phone]

## 2019-04-13 NOTE — SUBJECTIVE & OBJECTIVE
Interval History: NAEON    Review of Systems   All other systems reviewed and are negative.    Objective:     Vital Signs (Most Recent):  Temp: 97.8 °F (36.6 °C) (04/12/19 0631)  Pulse: 96 (04/12/19 1800)  Resp: 16 (04/12/19 1800)  BP: 124/83 (04/12/19 1800)  SpO2: 100 % (04/12/19 1800) Vital Signs (24h Range):  Temp:  [97.7 °F (36.5 °C)-101.3 °F (38.5 °C)] 97.8 °F (36.6 °C)  Pulse:  [] 96  Resp:  [15-28] 16  SpO2:  [93 %-100 %] 100 %  BP: ()/(48-87) 124/83     Weight: 92.1 kg (203 lb)  Body mass index is 31.79 kg/m².     SpO2: 100 %  O2 Device (Oxygen Therapy): nasal cannula      Intake/Output Summary (Last 24 hours) at 4/12/2019 1909  Last data filed at 4/12/2019 1155  Gross per 24 hour   Intake 1726.92 ml   Output --   Net 1726.92 ml       Lines/Drains/Airways     Central Venous Catheter Line                 Hemodialysis Catheter 03/28/19 1528 left femoral 15 days          Drain                 Hemodialysis AV Fistula Left upper arm -- days         Hemodialysis AV Graft Right upper arm -- days          Peripheral Intravenous Line                 Peripheral IV - Single Lumen 04/12/19 0155 22 G Right Hand less than 1 day         Peripheral IV - Single Lumen 04/12/19 0300 20 G Left Forearm less than 1 day                Physical Exam   Constitutional: She is oriented to person, place, and time. She appears well-developed and well-nourished. No distress.   HENT:   Head: Normocephalic and atraumatic.   Mouth/Throat: Oropharynx is clear and moist.   Eyes: Pupils are equal, round, and reactive to light. EOM are normal.   Neck: Normal range of motion. Neck supple. No JVD present.   Cardiovascular: Normal rate and regular rhythm. Exam reveals no gallop and no friction rub.   No murmur heard.  Pulmonary/Chest: Effort normal and breath sounds normal. No respiratory distress. She has no wheezes. She has no rales. She exhibits no tenderness.   Abdominal: Soft. Bowel sounds are normal. She exhibits no distension.  There is no tenderness.   Musculoskeletal: Normal range of motion. She exhibits no edema.   Lymphadenopathy:     She has no cervical adenopathy.   Neurological: She is alert and oriented to person, place, and time.   Skin: Skin is warm and dry. No erythema.   Psychiatric: She has a normal mood and affect. Her behavior is normal. Judgment and thought content normal.       Significant Labs: All pertinent lab results from the last 24 hours have been reviewed.    Significant Imaging: Reviewed in EPIC.

## 2019-04-13 NOTE — SUBJECTIVE & OBJECTIVE
Interval History:   Patient evaluated at bedside, current has been wean of pressors. With oxygen requirement via nasal canula. NET positive 2 L     Review of patient's allergies indicates:   Allergen Reactions    Clindamycin Diarrhea    Flagyl [metronidazole hcl]     Metronidazole      Current Facility-Administered Medications   Medication Frequency    0.9%  NaCl infusion (CRRT USE ONLY) Continuous    0.9%  NaCl infusion (for blood administration) Q24H PRN    aspirin EC tablet 81 mg Daily    atorvastatin tablet 40 mg QHS    calcitRIOL capsule 0.5 mcg Daily    clopidogrel tablet 75 mg Daily    dextrose 50% injection 12.5 g PRN    dextrose 50% injection 25 g PRN    glucagon (human recombinant) injection 1 mg PRN    glucose chewable tablet 16 g PRN    glucose chewable tablet 24 g PRN    heparin 25,000 units in dextrose 5% (100 units/ml) IV bolus from bag - ADDITIONAL PRN BOLUS - 30 units/kg (max bolus 4000 units) PRN    heparin 25,000 units in dextrose 5% (100 units/ml) IV bolus from bag - ADDITIONAL PRN BOLUS - 60 units/kg (max bolus 4000 units) PRN    heparin 25,000 units in dextrose 5% 250 mL (100 units/mL) infusion LOW INTENSITY nomogram - [facility] Continuous    insulin aspart U-100 pen 0-5 Units QID (AC + HS) PRN    magnesium sulfate 2g in water 50mL IVPB (premix) PRN    norepinephrine 4 mg in dextrose 5% 250 mL infusion (premix) (titrating) Continuous    pantoprazole EC tablet 40 mg Daily    piperacillin-tazobactam 4.5 g in sodium chloride 0.9% 100 mL IVPB (ready to mix system) Q12H    sevelamer carbonate tablet 800 mg TID WM       Objective:     Vital Signs (Most Recent):  Temp: 100.1 °F (37.8 °C) (04/13/19 1100)  Pulse: (!) 119 (04/13/19 1200)  Resp: (!) 26 (04/13/19 1200)  BP: 104/75 (04/13/19 1200)  SpO2: 100 % (04/13/19 1200)  O2 Device (Oxygen Therapy): nasal cannula (04/13/19 1200) Vital Signs (24h Range):  Temp:  [98.4 °F (36.9 °C)-100.1 °F (37.8 °C)] 100.1 °F (37.8 °C)  Pulse:  []  119  Resp:  [13-36] 26  SpO2:  [90 %-100 %] 100 %  BP: ()/(50-83) 104/75     Weight: 97.3 kg (214 lb 8.1 oz) (04/12/19 1900)  Body mass index is 33.6 kg/m².  Body surface area is 2.14 meters squared.    I/O last 3 completed shifts:  In: 1954.2 [P.O.:50; I.V.:210.9; Blood:3.3; IV Piggyback:1690]  Out: -     Physical Exam   Constitutional: She is oriented to person, place, and time. She appears well-developed and well-nourished. No distress.   HENT:   Head: Normocephalic and atraumatic.   Right Ear: External ear normal.   Left Ear: External ear normal.   Eyes: Conjunctivae and EOM are normal. Right eye exhibits no discharge. Left eye exhibits no discharge.   Neck: Normal range of motion. Neck supple.   Cardiovascular: Normal rate and regular rhythm. Exam reveals no gallop and no friction rub.   No murmur heard.  Pulmonary/Chest: Effort normal and breath sounds normal. No respiratory distress. She has no wheezes. She has no rales.   Abdominal: Soft. Bowel sounds are normal. She exhibits no distension. There is no tenderness.   Musculoskeletal: Normal range of motion. She exhibits no edema or deformity.   Neurological: She is alert and oriented to person, place, and time.   Skin: Skin is warm and dry. She is not diaphoretic.   L femoral tunneled dialysis catheter   Psychiatric: She has a normal mood and affect. Her behavior is normal.       Significant Labs:  ABGs:   Recent Labs   Lab 04/12/19  0752   PH 7.301*   PCO2 47.1*   HCO3 23.2*   POCSATURATED 31*   BE -3     BMP:   Recent Labs   Lab 04/13/19  0642 04/13/19  1016   * 188*    97   CO2 17* 24   BUN 25* 32*   CREATININE 4.4* 6.0*   CALCIUM 7.4* 9.5   MG 1.3*  --      CBC:   Recent Labs   Lab 04/13/19  0445   WBC 12.23   RBC 2.60*   HGB 7.8*   HCT 25.1*      MCV 97   MCH 30.0   MCHC 31.1*     CMP:   Recent Labs   Lab 04/13/19  0642 04/13/19  1016   * 188*   CALCIUM 7.4* 9.5   ALBUMIN 1.4*  1.4* 1.8*   PROT 5.4*  5.4*  --      137   K 3.9 4.7   CO2 17* 24    97   BUN 25* 32*   CREATININE 4.4* 6.0*   ALKPHOS 96  96  --    *  301*  --    *  354*  --    BILITOT 0.1  0.1  --      All labs within the past 24 hours have been reviewed.

## 2019-04-13 NOTE — ASSESSMENT & PLAN NOTE
-at home gets dialysis TTS  -Nephrology consulted, appreciate recomendations   ----femoral tunneled cath dose not appear infected, will maintain line due to limited vascular options  ----if pt deteriorates or fails to improve with Abx, then will consider changing out tunneled catheter  ----planning on SLED today, may required resumption of levo for dialysis

## 2019-04-13 NOTE — PLAN OF CARE
Problem: Adult Inpatient Plan of Care  Goal: Plan of Care Review  Outcome: Ongoing (interventions implemented as appropriate)    No acute events throughout the shift. See vital signs and assessments in flowsheets. See below for updates on today's progress.     Pulmonary: on nasal cannula 2 LPM with oxygen saturation of 95-98%    Cardiovascular: NSR to ST with heart rate ranging from 90-110bpm; SBP 90-110mmHg and MAP greater than 65mmHg with Levo.    Neurological: Oriented  X  4    Gastrointestinal: No BM during the shift. On renal diet    Genitourinary: On regular HD    Skin/Bath: Multiple incisions from HD access (graft and AV fistula)      Infusions:  heparin    1 unit of RBC was transfused during the shift    Patient progressing towards goals as tolerated, plan of care communicated and reviewed with Jenni Todd and family. All concerns addressed. Will continue to monitor.

## 2019-04-13 NOTE — NURSING
Pt arrived to CMICU from ER; awake and alert; VS stable; heparin and levo infusing; denies pain or discomfort; oriented to room and unit; no questions or concerns

## 2019-04-13 NOTE — ASSESSMENT & PLAN NOTE
- continue Epogen per nephrology  - Hb 7.1 yesterday, 7.8 today  - received 1 u PRBCs overnight per cards recomendation

## 2019-04-13 NOTE — PLAN OF CARE
Problem: Adult Inpatient Plan of Care  Goal: Plan of Care Review  No acute events throughout the shift. See vital signs and assessments in flowsheets. See below for updates on today's progress.      Pulmonary: on nasal cannula 2 LPM with oxygen saturation of 95-98%     Cardiovascular: NSR to ST with heart rate ranging from 90-110bpm; SBP 90-110mmHg and MAP greater than 65mmHg with Levo.     Neurological: Oriented  X  4     Gastrointestinal: No BM during the shift. On diabetic diet     Genitourinary: On CRRT SLED     Skin/Bath: Multiple incisions from HD access (graft and AV fistula)                             Infusions:  heparin 8 units         Patient progressing towards goals as tolerated, plan of care communicated and reviewed with Jenni Todd and family. All concerns addressed. Will continue to monitor.

## 2019-04-13 NOTE — ASSESSMENT & PLAN NOTE
ESRD on iHD TTS  [facility]  L femoral tunneled catheter (3/28)    49 yo female well known to our service who has been dialysis dependent for >15 years, who presents from SNF for hypotension and fever after hemodialysis on Thursday.  Nephrology has been consulted for ESRD management while in-patient.    Plan/recommendations:  -Will schedule for SLED treatment today in which has been wean of levophed but blood pressures continue to be boarder line in which most likely will require during treatment   -Dressing removed from femoral catheter, negative for signs of infection.  No exudate appreciated from tunnel/exit site.  -recommend treating with Abx and keeping current catheter due to limited vascular options.  If clinical conditions deteriorates or fails to improve with Abx alone, then will consider changing out tunneled catheter.

## 2019-04-13 NOTE — HOSPITAL COURSE
Admitted to MICU 4/12 for septic shock of unclear etiology, possibly 2/2 peritonitis vs femoral tunneled cath. Patient also found to have NSTEMI, likely type 2 and started on heparin, ASA, Plavix, lipitor. Evaluated by interventional cardiology and recommend continue tx for NSTEMI and out pt ischemic eval when more stable. Pt started on broad spectrum abx on admission with vanc and zosyn. BCx remain negative. Femoral line does not appear infection on exam, unlikely source of infection, will maintain line.Pt to get a LHC and SLED

## 2019-04-13 NOTE — ASSESSMENT & PLAN NOTE
- continue with plavix , heparin ggt, ASA, Lipitor  - interventional cardiology evaluated patient, planning on out patient ischemic evaluation  - echo ordered

## 2019-04-13 NOTE — ASSESSMENT & PLAN NOTE
-Patient admitted for hypotension with lactic acid > 12  -Source likely bacteremia / peritonitis /pneumonia /line . However she has no other symptoms of cough or SOB   - CXR with increased opacity in the Left lobe   - Was given cefepime, vancomycin , and zosyn in the ED.  She also received a total of 1.5L     Plan   - Continue broad spectrum antibiotics with vancomycin and zosyn  (start date 4/12/2019)  - LA and WBC has trended down  - Continue pressors and wean off goal MAP >65; pressors discontinued 4/13, may need to be resumed with dialysis  - Will maitain femoral line as this dose not appear to be the source of infection  - BCx 4/12 NGTD

## 2019-04-13 NOTE — SUBJECTIVE & OBJECTIVE
Interval History: NAEON. Patient is feeling well this AM and has no complaints. She received 1 u pRBCs overnight per cards recommendation and is no longer requiring levo. She receives TTS dialysis normally, no dialysis done yesterday, nephrology planning on SLED today.      Past Surgical History:   Procedure Laterality Date    CARDIAC CATHETERIZATION      PCI x 2     SECTION, CLASSIC      COLONOSCOPY N/A 2018    Performed by Rodrigue Russell MD at [facility] ENDO (2ND FLR)    DEBRIDEMENT-WOUND Left 2016    Performed by Teressa Maddox MD at [facility] OR    DIALYSIS FISTULA CREATION      multiple fistulas and grafts before PD     EGD (ESOPHAGOGASTRODUODENOSCOPY) N/A 3/14/2019    Performed by Adolph Davila MD at [facility] ENDO    EGD (ESOPHAGOGASTRODUODENOSCOPY) N/A 3/13/2019    Performed by Adolph Davila MD at [facility] ENDO    INCISION AND DRAINAGE (I&D), LABIAL N/A 2016    Performed by Harmony Fernandez MD at [facility] OR    INCISION AND DRAINAGE (I&D), LABIAL (ADD ON) Left 2016    Performed by Teressa Maddox MD at [facility] OR    INCISION AND DRAINAGE OF WOUND Left     VULVAR ABCESS WITH NECROSIS    Insertion, Catheter, Central Venous, Hemodialysis N/A 3/28/2019    Performed by Kimo Weeks MD at [facility] CATH LAB    Insertion, Catheter, Central Venous, Hemodialysis N/A 3/18/2019    Performed by Kimo Weeks MD at [facility] CATH LAB    INSERTION, CATHETER, TUNNELED ABORTED Left 3/14/2019    Performed by Servando Solis MD at [facility] OR    INSERTION, GRAFT, ARTERIOVENOUS, RIGHT ARM Right 3/22/2019    Performed by BESSIE Wynn III, MD at [facility] OR 2ND FLR    ORIF, HIP Left 2018    Performed by Tevin Grullon MD at [facility] OR    PERITONEAL CATHETER INSERTION      PERMCATH REWIRE- TUNNELED CATH REWIRE Left 2017    Performed by Baldev Munroe MD at [facility] CATH LAB    PERMCATH REWIRE- TUNNELED CATH REWIRE N/A 10/5/2017    Performed by Baldev Munroe MD at [facility] CATH LAB     REMOVAL, CATHETER, DIALYSIS, PERITONEAL N/A 3/14/2019    Performed by Servando Sol[facility] MD at [facility] OR    UMBILICAL HERNIA REPAIR      Venogram, possible intervention Right 3/20/2019    Performed by BESSIE Wynn III, MD at [facility] CATH LAB       Review of patient's allergies indicates:   Allergen Reactions    Clindamycin Diarrhea    Flagyl [metronidazole hcl]     Metronidazole        Family History     Problem Relation (Age of Onset)    Cancer Maternal Grandmother, Paternal Grandfather    Diabetes Maternal Aunt, Paternal Aunt        Tobacco Use    Smoking status: Never Smoker    Smokeless tobacco: Never Used   Substance and Sexual Activity    Alcohol use: No     Comment: Pt reports some social use of about 1-2 drinks about every six months.    Drug use: No    Sexual activity: Not Currently     Partners: Male     Birth control/protection: None      Review of Systems   Constitutional: Negative for chills, fatigue and fever.   HENT: Negative for congestion.    Respiratory: Negative for chest tightness and shortness of breath.    Cardiovascular: Negative for chest pain, palpitations and leg swelling.   Gastrointestinal: Negative for abdominal pain.   Genitourinary: Negative for pelvic pain.   Musculoskeletal: Negative for back pain.   Skin: Negative for color change, pallor, rash and wound.   Neurological: Negative for dizziness and light-headedness.   Psychiatric/Behavioral: Negative for agitation.     Objective:     Vital Signs (Most Recent):  Temp: 99.8 °F (37.7 °C) (04/13/19 0730)  Pulse: (!) 116 (04/13/19 0826)  Resp: 19 (04/13/19 0826)  BP: 107/78 (04/13/19 0730)  SpO2: 97 % (04/13/19 0826) Vital Signs (24h Range):  Temp:  [98.4 °F (36.9 °C)-99.8 °F (37.7 °C)] 99.8 °F (37.7 °C)  Pulse:  [] 116  Resp:  [13-30] 19  SpO2:  [90 %-100 %] 97 %  BP: ()/(50-83) 107/78   Weight: 97.3 kg (214 lb 8.1 oz)  Body mass index is 33.6 kg/m².      Intake/Output Summary (Last 24 hours) at 4/13/2019  0845  Last data filed at 4/13/2019 0700  Gross per 24 hour   Intake 304.22 ml   Output --   Net 304.22 ml       Physical Exam   Constitutional: She appears well-developed and well-nourished. No distress.   HENT:   Head: Normocephalic and atraumatic.   Eyes: Pupils are equal, round, and reactive to light. EOM are normal.   Neck: Normal range of motion. Neck supple. No JVD present.   Cardiovascular: Normal rate, regular rhythm and normal heart sounds.   Pulmonary/Chest: Effort normal and breath sounds normal. No respiratory distress.   Abdominal: Soft. Bowel sounds are normal. She exhibits distension.   Musculoskeletal: She exhibits no edema.   Right femoral site clean dry and intact no erythema or tenderness   Neurological: She is alert.   Psychiatric: She has a normal mood and affect.       Vents:     Lines/Drains/Airways     Central Venous Catheter Line                 Hemodialysis Catheter 03/28/19 1528 left femoral 15 days          Drain                 Hemodialysis AV Fistula Left upper arm -- days         Hemodialysis AV Graft Right upper arm -- days          Peripheral Intravenous Line                 Peripheral IV - Single Lumen 04/12/19 0155 22 G Right Hand 1 day         Peripheral IV - Single Lumen 04/12/19 0300 20 G Left Forearm 1 day              Significant Labs:    CBC/Anemia Profile:  Recent Labs   Lab 04/12/19  0222 04/13/19  0445   WBC 15.15* 12.23   HGB 7.1* 7.8*   HCT 24.6* 25.1*    179   * 97   RDW 17.6* 17.1*        Chemistries:  Recent Labs   Lab 04/12/19  0222 04/12/19  1255 04/12/19  1723 04/13/19  0642    135* 131*  --    K 5.1 4.3 4.8  --    CL 98 96 94*  --    CO2 18* 24 23  --    BUN 20 29* 26*  --    CREATININE 4.6* 5.6* 5.1*  --    CALCIUM 9.7 10.0 9.5  --    ALBUMIN 1.9* 1.8* 1.7* 1.4*   PROT 7.5  --   --  5.4*   BILITOT 0.3  --   --  0.1   ALKPHOS 129  --   --  96   ALT 30  --   --  301*   AST 99*  --   --  354*   MG 1.8  --   --   --    PHOS  --  7.0* 7.8*  --         Lactic Acid:   Recent Labs   Lab 04/12/19  1255 04/12/19  2128 04/13/19  0445   LACTATE 1.7 1.9 2.3*       Significant Imaging: I have reviewed all pertinent imaging results/findings within the past 24 hours.  I have reviewed and interpreted all pertinent imaging results/findings within the past 24 hours.

## 2019-04-13 NOTE — PROGRESS NOTES
[facility]  Cardiology  Progress Note    Patient Name: Jenni Todd  MRN: [id number]  Admission Date: 4/12/2019  Hospital Length of Stay: 1 days  Code Status: Full Code   Attending Physician: Mandeep Angeles*   Primary Care Physician: Michael Tan Iii, MD  Expected Discharge Date:   Principal Problem:Septic shock    Subjective:     Interval History: NAEON. Weaned off pressors. Patient clinically feeling better. Denies recurrent cp, f/c/s, sob, n/v.    Trop up to 16.     Review of Systems   All other systems reviewed and are negative.    Objective:     Vital Signs (Most Recent):  Temp: 97.8 °F (36.6 °C) (04/12/19 0631)  Pulse: 96 (04/12/19 1800)  Resp: 16 (04/12/19 1800)  BP: 124/83 (04/12/19 1800)  SpO2: 100 % (04/12/19 1800) Vital Signs (24h Range):  Temp:  [97.7 °F (36.5 °C)-101.3 °F (38.5 °C)] 97.8 °F (36.6 °C)  Pulse:  [] 96  Resp:  [15-28] 16  SpO2:  [93 %-100 %] 100 %  BP: ()/(48-87) 124/83     Weight: 92.1 kg (203 lb)  Body mass index is 31.79 kg/m².     SpO2: 100 %  O2 Device (Oxygen Therapy): nasal cannula      Intake/Output Summary (Last 24 hours) at 4/12/2019 1909  Last data filed at 4/12/2019 1155  Gross per 24 hour   Intake 1726.92 ml   Output --   Net 1726.92 ml       Lines/Drains/Airways     Central Venous Catheter Line                 Hemodialysis Catheter 03/28/19 1528 left femoral 15 days          Drain                 Hemodialysis AV Fistula Left upper arm -- days         Hemodialysis AV Graft Right upper arm -- days          Peripheral Intravenous Line                 Peripheral IV - Single Lumen 04/12/19 0155 22 G Right Hand less than 1 day         Peripheral IV - Single Lumen 04/12/19 0300 20 G Left Forearm less than 1 day                Physical Exam   Constitutional: She is oriented to person, place, and time. She appears well-developed and well-nourished. No distress.   HENT:   Head: Normocephalic and atraumatic.   Mouth/Throat: Oropharynx is clear  and moist.   Eyes: Pupils are equal, round, and reactive to light. EOM are normal.   Neck: Normal range of motion. Neck supple. No JVD present.   Cardiovascular: Normal rate and regular rhythm. Exam reveals no gallop and no friction rub.   No murmur heard.  Pulmonary/Chest: Effort normal and breath sounds normal. No respiratory distress. She has no wheezes. She has no rales. She exhibits no tenderness.   Abdominal: Soft. Bowel sounds are normal. She exhibits no distension. There is no tenderness.   Musculoskeletal: Normal range of motion. She exhibits no edema.   Lymphadenopathy:     She has no cervical adenopathy.   Neurological: She is alert and oriented to person, place, and time.   Skin: Skin is warm and dry. No erythema.   Psychiatric: She has a normal mood and affect. Her behavior is normal. Judgment and thought content normal.       Significant Labs: All pertinent lab results from the last 24 hours have been reviewed.    Significant Imaging: Reviewed in EPIC.    Assessment and Plan:     NSTEMI (non-ST elevated myocardial infarction)  49 yo F with PMHx significant for CAD s/p PCI to RCA (2012), ESRD on HD, HFrEF (30%), HTN, DM admitted with hypotension in setting of fevers, leukocytosis, elevated lactate concerning for sepsis (negative infectious w/u thus far); but also c/o chest pain with dynamic EKG changes and elevated trop (16) for which Cardiology is consulted.     Cannot rule out type 1 MI.        Recommendations:  -- Continue to treat as type 1 NSTEMI.  -- Continue ASA and Plavix daily.  -- Continue heparin gtt x 48 hours per ACS protocol.  -- Needs ischemic eval, will plan for LHC on Monday 4/15.  -- Trend troponin until peak.  -- EKGs PRN chest pain.  -- Will continue to follow.        VTE Risk Mitigation (From admission, onward)        Ordered     heparin 25,000 units in dextrose 5% 250 mL (100 units/mL) infusion LOW INTENSITY nomogram - [facility]  Continuous      04/12/19 0916     heparin 25,000 units in  dextrose 5% (100 units/ml) IV bolus from bag - ADDITIONAL PRN BOLUS - 60 units/kg (max bolus 4000 units)  As needed (PRN)      04/12/19 0916     heparin 25,000 units in dextrose 5% (100 units/ml) IV bolus from bag - ADDITIONAL PRN BOLUS - 30 units/kg (max bolus 4000 units)  As needed (PRN)      04/12/19 0916     IP VTE HIGH RISK PATIENT  Once      04/12/19 0440          Sivan Carlton MD  Cardiology  [facility]

## 2019-04-14 NOTE — NURSING
Phoned dialysis nurse. Patient clotted off on CRRT. Tx ran for 6hr 53 mins. Blood loss. Will continue to monitor.

## 2019-04-14 NOTE — NURSING
Phone [facility] about CRRT clotting off and blood loss. Pt now hypotensice SBP 80s MAP 63-65. Levo restarted. CBC collected. Cathflo ordered. Team also notified that bilateral tibial pulses were not palpable or able to doppler and that pt toes on both feet are dusky. Will continue to monitor.

## 2019-04-15 PROBLEM — L89.152 PRESSURE INJURY OF SACRAL REGION, STAGE 2: Status: ACTIVE | Noted: 2019-01-01

## 2019-04-15 PROBLEM — R57.0 CARDIOGENIC SHOCK: Status: ACTIVE | Noted: 2019-01-01

## 2019-04-15 PROBLEM — T07.XXXA MULTIPLE WOUNDS: Status: ACTIVE | Noted: 2019-01-01

## 2019-04-15 PROBLEM — I25.10 3-VESSEL CORONARY ARTERY DISEASE: Status: ACTIVE | Noted: 2019-01-01

## 2019-04-15 NOTE — PROGRESS NOTES
Wound care consult for abdominal wounds. Patient was seen last week on the SNF unit and has now transferred to [facility].  Patient with healing wounds to the abdomen. Wounds appear clean with no odor or purulence. Wounds cleansed and repacked with aquacel ag and secured with border dressing.     Buttocks wound appears nearly healed. Triad paste applied.     Patient does not want EHOB overlay. Patient encouraged to turn frequently. She allowed by to turn her to the right side with a pillow.     Wound care to follow PRN.   Cammy Ferrara RN [facility]   [phone]      Recommend:  Aquacel Ag q MWF to abd wounds  Triad BID to the buttocks  q2hour turns  Heels offloaded.                  04/15/19 1144        Incision/Site 03/19/19 Right Abdomen   Date First Assessed: 03/19/19   Side: Right  Location: Abdomen   Wound Image    Incision WDL ex   Dressing Appearance Intact   Drainage Amount Small   Drainage Characteristics/Odor Serosanguineous   Appearance Pink   Red (%), Wound Tissue Color 90 %   Yellow (%), Wound Tissue Color 10 %   Periwound Area Intact   Wound Edges Open   Wound Length (cm) 0.8 cm   Wound Width (cm) 0.9 cm   Wound Depth (cm) 1 cm   Wound Volume (cm^3) 0.72 cm^3   Wound Surface Area (cm^2) 0.72 cm^2   Care Cleansed with:;Sterile normal saline   Dressing Removed;Applied;Changed;Silver   Dressing Change Due 04/17/19        Incision/Site 03/19/19 Abdomen lower   Date First Assessed: 03/19/19   Location: Abdomen  Orientation: lower   Wound Image    Incision WDL ex   Dressing Appearance Dry   Drainage Amount Small   Drainage Characteristics/Odor Serosanguineous   Appearance Red;Yellow   Red (%), Wound Tissue Color 50 %   Yellow (%), Wound Tissue Color 50 %   Periwound Area Intact   Wound Edges Open   Wound Length (cm) 0.6 cm   Wound Width (cm) 2 cm   Wound Depth (cm) 4.5 cm   Wound Volume (cm^3) 5.4 cm^3   Wound Surface Area (cm^2) 1.2 cm^2   Care Cleansed with:;Sterile normal saline   Dressing  Removed;Applied;Changed;Silver   Dressing Change Due 04/15/19        Incision/Site 03/14/19 1445 Left Abdomen   Date First Assessed/Time First Assessed: 03/14/19 1445   Side: Left  Location: Abdomen   Incision WDL ex   Dressing Appearance Intact   Drainage Amount Small   Drainage Characteristics/Odor Serosanguineous   Appearance Red;Fibrin;Granulating   Red (%), Wound Tissue Color 70 %   Yellow (%), Wound Tissue Color 30 %   Periwound Area Intact   Wound Edges Open   Wound Length (cm) 0.98 cm   Wound Width (cm) 1.8 cm   Wound Depth (cm) 1.5 cm   Wound Volume (cm^3) 2.65 cm^3   Wound Surface Area (cm^2) 1.76 cm^2   Care Cleansed with:;Sterile normal saline   Dressing Removed;Applied;Changed;Silver   Dressing Change Due 04/17/19        Pressure Injury 03/27/19 Right Buttocks Stage 2   Date First Assessed: 03/27/19   Pressure Injury Present on Admission: suspected hospital acquired  Side: Right  Location: Buttocks  Staging: Stage 2   Wound Image    Staging Stage 2   Dressing Appearance Open to air   Drainage Amount None   Drainage Characteristics/Odor No odor   Appearance Pink   Tissue loss description Partial thickness   Red (%), Wound Tissue Color 100 %   Wound Length (cm) 0.2 cm   Wound Width (cm) 0.2 cm   Wound Depth (cm) 0.1 cm   Wound Volume (cm^3) 0 cm^3   Wound Surface Area (cm^2) 0.04 cm^2   Care Cleansed with:;Applied:;Skin Barrier

## 2019-04-15 NOTE — ASSESSMENT & PLAN NOTE
-c/w ACS protocol (DAPT- ASA/Plavix and heparin drip)   -LV EF of 40%, need to initiate GDMT (beta-blocker, ACE-I or ARB)   -consult Interventional Cardiology for ischemic workup with [facility]

## 2019-04-15 NOTE — SUBJECTIVE & OBJECTIVE
Interval History: Levo discontinued @ 2147 last night. Pt has no new complaints. She denies CP. Endorses SOB with coughing episodes. Possible LHC today.     Review of Systems   Constitution: Negative for chills and fever.   Cardiovascular: Negative for chest pain and palpitations.   Respiratory: Positive for cough and shortness of breath (only with cough).    Gastrointestinal: Negative for nausea and vomiting.     Objective:     Vital Signs (Most Recent):  Temp: 97.6 °F (36.4 °C) (04/15/19 0300)  Pulse: 102 (04/15/19 0828)  Resp: 16 (04/15/19 0828)  BP: 111/79 (04/15/19 0800)  SpO2: 100 % (04/15/19 0828) Vital Signs (24h Range):  Temp:  [97.6 °F (36.4 °C)-98.9 °F (37.2 °C)] 97.6 °F (36.4 °C)  Pulse:  [102-112] 102  Resp:  [13-34] 16  SpO2:  [79 %-100 %] 100 %  BP: ()/(61-79) 111/79     Weight: 97.1 kg (214 lb)  Body mass index is 33.52 kg/m².     SpO2: 100 %  O2 Device (Oxygen Therapy): room air      Intake/Output Summary (Last 24 hours) at 4/15/2019 0931  Last data filed at 4/15/2019 0800  Gross per 24 hour   Intake 778.32 ml   Output 0 ml   Net 778.32 ml       Lines/Drains/Airways     Central Venous Catheter Line                 Hemodialysis Catheter 03/28/19 1528 left femoral 17 days          Drain                 Hemodialysis AV Fistula Left upper arm -- days         Hemodialysis AV Graft Right upper arm -- days          Peripheral Intravenous Line                 Peripheral IV - Single Lumen 04/12/19 0155 22 G Right Hand 3 days         Peripheral IV - Single Lumen 04/12/19 0300 20 G Left Forearm 3 days                Physical Exam   Constitutional: She is oriented to person, place, and time. She appears well-developed. No distress.   HENT:   Head: Normocephalic and atraumatic.   Eyes: Conjunctivae are normal. No scleral icterus.   Neck: Neck supple. No JVD present.   Cardiovascular: Regular rhythm. Tachycardia present.   Pulmonary/Chest: Effort normal. No respiratory distress.   Fine bibasilar crackles     Abdominal: Soft. Bowel sounds are normal. She exhibits no distension. There is no tenderness.   Musculoskeletal: Normal range of motion. She exhibits no edema.   Neurological: She is alert and oriented to person, place, and time.   Skin: Skin is warm and dry.   Psychiatric: She has a normal mood and affect. Her behavior is normal. Judgment and thought content normal.       Significant Labs:     CMP   Recent Labs   Lab 19  2248 04/15/19  0343    135* 136   K 4.4 4.8 4.8    100 100   CO2 26 23 24   * 184* 159*   BUN 18 24* 25*   CREATININE 3.5* 4.9* 5.2*   CALCIUM 9.1 9.7 9.5   PROT  --   --  6.8   ALBUMIN 1.7* 1.6* 1.7*  1.7*   BILITOT  --   --  0.3   ALKPHOS  --   --  112   AST  --   --  143*   ALT  --   --  348*   ANIONGAP 10 12 12   ESTGFRAFRICA 16.8* 11.2* 10.4*   EGFRNONAA 14.6* 9.7* 9.0*     CBC   Recent Labs   Lab 19  0040 04/15/19  0343   WBC 15.64* 12.39   HGB 7.5* 7.1*   HCT 23.3* 21.9*    263     Significant ImaginD echo with color flow doppler:         Results for orders placed or performed during the hospital encounter of 16   2D echo with color flow doppler   Result Value Ref Range     QEF 60 55 - 65     Mitral Valve Regurgitation MILD       Diastolic Dysfunction No       Aortic Valve Stenosis TRIVIAL TO MILD

## 2019-04-15 NOTE — PROGRESS NOTES
[facility]  Nephrology  Progress Note    Patient Name: Jenni Todd  MRN: [id number]  Admission Date: 4/12/2019  Hospital Length of Stay: 3 days  Attending Provider: Jewel Montenegro MD   Primary Care Physician: Michael Tan Iii, MD  Principal Problem:Septic shock    Subjective:     Interval History:   NAEON.  Last had SLED on Saturday.  Plans for [facility] today per cardiology.      Review of patient's allergies indicates:   Allergen Reactions    Clindamycin Diarrhea    Flagyl [metronidazole hcl]     Metronidazole      Current Facility-Administered Medications   Medication Frequency    0.9%  NaCl infusion (for blood administration) Q24H PRN    albuterol-ipratropium 2.5 mg-0.5 mg/3 mL nebulizer solution 3 mL Q4H PRN    aspirin EC tablet 81 mg Daily    atorvastatin tablet 40 mg QHS    benzonatate capsule 100 mg TID PRN    calcitRIOL capsule 0.5 mcg Daily    clopidogrel tablet 75 mg Daily    dextrose 50% injection 12.5 g PRN    dextrose 50% injection 25 g PRN    glucagon (human recombinant) injection 1 mg PRN    glucose chewable tablet 16 g PRN    glucose chewable tablet 24 g PRN    heparin 25,000 units in dextrose 5% (100 units/ml) IV bolus from bag - ADDITIONAL PRN BOLUS - 30 units/kg (max bolus 4000 units) PRN    heparin 25,000 units in dextrose 5% (100 units/ml) IV bolus from bag - ADDITIONAL PRN BOLUS - 60 units/kg (max bolus 4000 units) PRN    heparin 25,000 units in dextrose 5% 250 mL (100 units/mL) infusion LOW INTENSITY nomogram - [facility] Continuous    insulin aspart U-100 pen 0-5 Units QID (AC + HS) PRN    loperamide capsule 2 mg TID PRN    magnesium sulfate 2g in water 50mL IVPB (premix) PRN    norepinephrine 4 mg in dextrose 5% 250 mL infusion (premix) (titrating) Continuous    pantoprazole EC tablet 40 mg Daily    piperacillin-tazobactam 4.5 g in sodium chloride 0.9% 100 mL IVPB (ready to mix system) Q12H    sevelamer carbonate tablet 800 mg TID WM    sodium  chloride 0.9% flush 10 mL PRN       Objective:     Vital Signs (Most Recent):  Temp: 97.6 °F (36.4 °C) (04/15/19 0300)  Pulse: 103 (04/15/19 1130)  Resp: 19 (04/15/19 1130)  BP: 102/61 (04/15/19 1100)  SpO2: 97 % (04/15/19 1130)  O2 Device (Oxygen Therapy): room air (04/15/19 0828) Vital Signs (24h Range):  Temp:  [97.6 °F (36.4 °C)-98.9 °F (37.2 °C)] 97.6 °F (36.4 °C)  Pulse:  [102-112] 103  Resp:  [13-34] 19  SpO2:  [79 %-100 %] 97 %  BP: ()/(61-79) 102/61     Weight: 97.1 kg (214 lb) (04/14/19 1815)  Body mass index is 33.52 kg/m².  Body surface area is 2.14 meters squared.    I/O last 3 completed shifts:  In: 2782.7 [P.O.:360; I.V.:2222.7; IV Piggyback:200]  Out: 724 [Other:724]    Physical Exam   Constitutional: She is oriented to person, place, and time. She appears well-developed and well-nourished. No distress.   HENT:   Head: Normocephalic and atraumatic.   Right Ear: External ear normal.   Left Ear: External ear normal.   Eyes: Conjunctivae and EOM are normal. Right eye exhibits no discharge. Left eye exhibits no discharge.   Neck: Normal range of motion. Neck supple.   Cardiovascular: Normal rate and regular rhythm. Exam reveals no gallop and no friction rub.   No murmur heard.  Pulmonary/Chest: Effort normal and breath sounds normal. No respiratory distress. She has no wheezes. She has no rales.   Abdominal: Soft. Bowel sounds are normal. She exhibits no distension. There is no tenderness.   Musculoskeletal: Normal range of motion. She exhibits no edema or deformity.   Neurological: She is alert and oriented to person, place, and time.   Skin: Skin is warm and dry. She is not diaphoretic.   L femoral tunneled dialysis catheter   Psychiatric: She has a normal mood and affect. Her behavior is normal.       Significant Labs:  CBC:   Recent Labs   Lab 04/15/19  0343   WBC 12.39   RBC 2.35*   HGB 7.1*   HCT 21.9*      MCV 93   MCH 30.2   MCHC 32.4     CMP:   Recent Labs   Lab 04/15/19  0343   GLU  159*   CALCIUM 9.5   ALBUMIN 1.7*  1.7*   PROT 6.8      K 4.8   CO2 24      BUN 25*   CREATININE 5.2*   ALKPHOS 112   *   *   BILITOT 0.3          Assessment/Plan:     ESRD (end stage renal disease) on dialysis  ESRD on iHD TTS  [facility]  L femoral tunneled catheter (3/28)    47 yo female well known to our service who has been dialysis dependent for >15 years, who presents from SNF for hypotension and fever after hemodialysis on Thursday.  Nephrology has been consulted for ESRD management while in-patient.    Off pressor support this morning, oxygenating well on RA.    Plan/recommendations:  -plan for 12 hour SLED post [facility] today  -UF goal 300-350 cc/hr as tolerated      Andrew Ragsdale NP  Nephrology  [facility]

## 2019-04-15 NOTE — CONSULTS
[facility]  Interventional Cardiology  Consult Note    Patient Name: Jenni Todd  MRN: [id number]  Admission Date: 4/12/2019  Hospital Length of Stay: 3 days  Code Status: Full Code   Attending Provider: Jewel Montenegro MD  Consulting Provider: Héctor Cardenas MD  Primary Care Physician: Michael Tan Iii, MD  Principal Problem:Septic shock    Patient information was obtained from patient and past medical records.     Inpatient consult to Interventional Cardiology  Consult performed by: Héctor Cardenas MD  Consult ordered by: Desiree Swift MD        Subjective:     Chief Complaint:  Fatigue  REASON FOR CONSULT: NSTEMI     HPI:  We are consulted for evaluation of NSTEMI in this 49yo lady who presented with hypotension after dialysis thought to be septic shock (but cultures have been negative, WBC is elevated but stable at 10-15km and no fevers as inpatient).  She presented with hypotension after dialysis on Thursday, one episode of chest pressure on Thurs that resolved after 20min (though she denies ever having chest pain/pressure and has a history of diabetic neuropathy), but does have a history of CAD with PCI x2 to the proximal and distal RCA in 2012 at [facility].     She is a patient of Dr Live and she has other PMH of ESRD on dialysis, hyperlipidemia, hypertension and PAD. Recently she was admitted to [facility] on 2/21 for peritonitis, had PD catheter removed. Had complicated HD access with failure of Bilateral IJ tunneled catheters, resulting in left femoral temporary catheter placement. Hospital course complicated by GIB with low risk ulcer. S/p OR 3/22 per vascular for RUE graft ([facility]). 3/28 groin perm-cath placed.    Currently she states she feels well and has no complaints.      Past Medical History:   Diagnosis Date    Abnormal finding on Pap smear, HPV DNA positive 2014    Anemia associated with chronic renal failure     on Epogen    Blood type B+      Bulging discs - symptomatic      CAD (coronary artery disease)     Cardiomyopathy 3/13/2019    Diabetes mellitus, type 2     ESRD (end stage renal disease) 2004    FSGS (focal segmental glomerulosclerosis)     with collapsing    Hyperlipidemia     Hypertension     Neuropathy     NSTEMI (non-ST elevated myocardial infarction) 3/29/2019    Obesity     Secondary hyperparathyroidism, renal     TIA (transient ischemic attack)     Uterine fibroid     small uterine        Past Surgical History:   Procedure Laterality Date    CARDIAC CATHETERIZATION      PCI x 2     SECTION, CLASSIC      COLONOSCOPY N/A 2018    Performed by Rodrigue Russell MD at [facility] ENDO (2ND FLR)    DEBRIDEMENT-WOUND Left 2016    Per[facility] by [facility] Maddox MD at [facility] OR    DIALYSIS FISTULA CREATION      multiple fistulas and grafts before PD     EGD (ESOPHAGOGASTRODUODENOSCOPY) N/A 3/14/2019    Performed by Adolph Davila MD at [facility] ENDO    EGD (ESOPHAGOGASTRODUODENOSCOPY) N/A 3/13/2019    Performed by Adolph [facility]ila MD at [facility] ENDO    INCISION AND DRAINAGE (I&D), LABIAL N/A 2016    Performed by Harmony Fernandez MD at [facility] OR    INCISION AND DRAINAGE (I&D), LABIAL (ADD ON) Left 2016    Performed by Teressa Maddox MD at [facility] OR    INCISION AND DRAINAGE OF WOUND Left     VULVAR ABCESS WITH NECROSIS    Insertion, Catheter, Central Venous, Hemodialysis N/A 3/28/2019    Performed by Kimo Weeks MD at [facility] CATH LAB    Insertion, Catheter, Central Venous, Hemodialysis N/A 3/18/2019    Performed by Kimo Weeks MD at [facility] CATH LAB    INSERTION, CATHETER, TUNNELED ABORTED Left 3/14/2019    Performed by Servando Solis MD at [facility] OR    INSERTION, GRAFT, ARTERIOVENOUS, RIGHT ARM Right 3/22/2019    Performed by BESSIE Wynn III, MD at [facility] OR 2ND FLR    ORIF, HIP Left 2018    Performed by Tevin Grullon MD at [facility] OR    PERITONEAL CATHETER INSERTION       PERMCATH REWIRE- TUNNELED CATH REWIRE Left 11/13/2017    Performed by [facility]roe MD at [facility] CATH LAB    PERMCATH REWIRE- TUNNELED CATH REWIRE N/A 10/5/2017    Performed by Baldev Munroe MD at [facility] CATH LAB    REMOVAL, CATHETER, DIALYSIS, PERITONEAL N/A 3/14/2019    [facility]formed by [facility] MD at [facility] OR    UMBILICAL HERNIA REPAIR      Venogram, possible intervention Right 3/20/2019    Performed by BESSIE Wynn III, MD at [facility] CATH LAB       Review of patient's allergies indicates:   Allergen Reactions    Clindamycin Diarrhea    Flagyl [metronidazole hcl]     Metronidazole        PTA Medications   Medication Sig    amLODIPine (NORVASC) 10 MG tablet Take 10 mg by mouth once daily.    aspirin (ECOTRIN) 81 MG EC tablet Take 1 tablet (81 mg total) by mouth once daily.    atorvastatin (LIPITOR) 40 MG tablet Take 40 mg by mouth every evening.     calcitRIOL (ROCALTROL) 0.25 MCG Cap Take 0.5 mcg by mouth once daily.     cinacalcet (SENSIPAR) 90 MG Tab Take 90 mg by mouth once daily.    epoetin adonay 10,000 unit/mL Soln 1 mL, epoetin adonay 20,000 unit/mL Soln 1 mL Inject 30,000 Units into the vein every 14 (fourteen) days.    gabapentin (NEURONTIN) 100 MG capsule 1 capsule 3 (three) times daily.    insulin detemir U-100 (LEVEMIR FLEXTOUCH) 100 unit/mL (3 mL) SubQ InPn pen Inject 10 Units into the skin every evening.    nateglinide (STARLIX) 120 MG tablet STARLIX 120MG 30 MINUTES BEFORE EACH MEAL.    ONETOUCH VERIO Strp USE 1 STRIP ONCE DAILY IN VITRO    pantoprazole (PROTONIX) 40 MG tablet Take 1 tablet (40 mg total) by mouth once daily.    sevelamer carbonate (RENVELA) 800 mg Tab Take 1 tablet (800 mg total) by mouth 3 (three) times daily with meals.    vitamin renal formula, B-complex-vitamin c-folic acid, (B COMPLEX-C-FOLIC ACID) 1 mg Cap Take 1 capsule by mouth once daily. 1 Capsule Oral Every day     Family History     Problem Relation (Age of Onset)    Cancer Maternal Grandmother,  Paternal Grandfather    Diabetes Maternal Aunt, Paternal Aunt        Tobacco Use    Smoking status: Never Smoker    Smokeless tobacco: Never Used   Substance and Sexual Activity    Alcohol use: No     Comment: Pt reports some social use of about 1-2 drinks about every six months.    Drug use: No    Sexual activity: Not Currently     Partners: Male     Birth control/protection: None     Review of Systems   Constitution: Positive for chills and malaise/fatigue. Negative for fever and weight gain.   HENT: Negative for congestion.    Eyes: Negative for visual disturbance.   Cardiovascular: Negative for chest pain, claudication, dyspnea on exertion, leg swelling, orthopnea, palpitations and syncope.   Respiratory: Negative for cough, shortness of breath and snoring.    Hematologic/Lymphatic: Does not bruise/bleed easily.   Skin: Negative for rash.   Musculoskeletal: Negative for muscle cramps and myalgias.   Gastrointestinal: Negative for bloating, abdominal pain, constipation, diarrhea and melena.   Genitourinary: Negative for bladder incontinence.   Neurological: Negative for excessive daytime sleepiness, focal weakness and weakness.   Psychiatric/Behavioral: Negative for depression and suicidal ideas.     Objective:     Vital Signs (Most Recent):  Temp: 97.6 °F (36.4 °C) (04/15/19 0300)  Pulse: 101 (04/15/19 1530)  Resp: (!) 25 (04/15/19 1530)  BP: 108/69 (04/15/19 1530)  SpO2: 97 % (04/15/19 1530) Vital Signs (24h Range):  Temp:  [97.6 °F (36.4 °C)-98.9 °F (37.2 °C)] 97.6 °F (36.4 °C)  Pulse:  [100-112] 101  Resp:  [1-34] 25  SpO2:  [79 %-100 %] 97 %  BP: ()/(61-79) 108/69     Weight: 97.1 kg (214 lb)  Body mass index is 33.52 kg/m².    SpO2: 97 %  O2 Device (Oxygen Therapy): room air      Intake/Output Summary (Last 24 hours) at 4/15/2019 4075  Last data filed at 4/15/2019 1500  Gross per 24 hour   Intake 1118.42 ml   Output 0 ml   Net 1118.42 ml       Lines/Drains/Airways     Central Venous Catheter Line                  Hemodialysis Catheter 03/28/19 1528 left femoral 18 days          Drain                 Hemodialysis AV Fistula Left upper arm -- days         Hemodialysis AV Graft Right upper arm -- days          Peripheral Intravenous Line                 Peripheral IV - Single Lumen 04/12/19 0155 22 G Right Hand 3 days         Peripheral IV - Single Lumen 04/12/19 0300 20 G Left Forearm 3 days                Physical Exam   Constitutional: She is oriented to person, place, and time. She appears well-developed and well-nourished. No distress.   Obese woman   HENT:   Head: Normocephalic and atraumatic.   Eyes: Pupils are equal, round, and reactive to light. Conjunctivae and EOM are normal. No scleral icterus.   Neck: Normal range of motion. No JVD present.   Cardiovascular: Normal rate, regular rhythm, normal heart sounds and intact distal pulses.   No murmur heard.  Pulses:       Radial pulses are 1+ on the right side, and 1+ on the left side.        Femoral pulses are 1+ on the right side, and 1+ on the left side.       Dorsalis pedis pulses are 0 on the right side, and 0 on the left side.        Posterior tibial pulses are 0 on the right side, and 0 on the left side.   DP doppler bilaterally, NO PT DOPPLER   Pulmonary/Chest: Effort normal and breath sounds normal.   Abdominal: Soft. Bowel sounds are normal. She exhibits no distension. There is no hepatosplenomegaly. There is no tenderness.   Musculoskeletal: Normal range of motion. She exhibits no edema.   Neurological: She is alert and oriented to person, place, and time. She has normal reflexes. No cranial nerve deficit.   Skin: Skin is warm and dry. No rash noted. She is not diaphoretic.   Cool Feet   Psychiatric: She has a normal mood and affect. Judgment and thought content normal.       Significant Labs:   CMP   Recent Labs   Lab 04/14/19  2248 04/15/19  0343 04/15/19  1430   * 136 136   K 4.8 4.8 4.9    100 100   CO2 23 24 25   * 159*  136*   BUN 24* 25* 26*   CREATININE 4.9* 5.2* 5.5*   CALCIUM 9.7 9.5 9.8   PROT  --  6.8  --    ALBUMIN 1.6* 1.7*  1.7* 1.6*   BILITOT  --  0.3  --    ALKPHOS  --  112  --    AST  --  143*  --    ALT  --  348*  --    ANIONGAP 12 12 11   ESTGFRAFRICA 11.2* 10.4* 9.7*   EGFRNONAA 9.7* 9.0* 8.4*   , CBC   Recent Labs   Lab 04/14/19  0040 04/15/19  0343   WBC 15.64* 12.39   HGB 7.5* 7.1*   HCT 23.3* 21.9*    263   , Lipid Panel No results for input(s): CHOL, HDL, LDLCALC, TRIG, CHOLHDL in the last 48 hours. and Troponin No results for input(s): TROPONINI in the last 48 hours.    Significant Imaging:   ECHO:   · Severely decreased left ventricular systolic function. The estimated ejection fraction is 25% (down from 40%)  · Left ventricular diastolic dysfunction.  · Local segmental wall motion abnormalities.  · Low normal right ventricular systolic function.  · Severe left atrial enlargement.  · Mild-to-moderate aortic valve stenosis.  · Aortic valve area is 1.48 cm2; peak velocity is 1.91 m/s; mean gradient is 10.27 mmHg.  · Moderate mitral sclerosis.  · Moderate to moderate-severe (2-3+) mitral regurgitation.  · Mild to moderate tricuspid regurgitation.  · Mild pulmonic regurgitation.  · Normal central venous pressure (3 mm Hg).  · The estimated PA systolic pressure is 32 mm Hg    EKG:  Sinus tachy, first degree block, T-wave inversions in I, AvL V5-6. Stable on serial EKGs since end of March    Assessment and Plan:     NSTEMI (non-ST elevated myocardial infarction)  Patient presents with NSTEMI most likely the result of progression of CAD (ESRD, obesity, prior CAD), ddx from hypotension post-dialysis. Infectious workup has been negative thus far.    - plan on 7Fr slender right groin access  - optimize medical managmenet as per prior cardiology recs with ARB/Entresto, beta blocker, DAPT, statin    - The risks, benefits & alternatives of the procedure were explained to the patient.    - The risks of coronary  angiography include but are not limited to:  Bleeding, infection, heart rhythm abnormalities, allergic reactions, kidney injury, stroke and death.    - Should stenting be indicated, the patient has agreed to dual anti-platelet therapy for one consecutive year with a drug-eluting stent and a minimum of 1-month with the use of a bare metal stent.    - The risks of moderate sedation include hypotension, respiratory depression, arrhythmias, bronchospasm, & death.    - Informed consent was obtained & the patient is agreeable to proceed with the procedure.  - This patient was discussed with the attending interventional cardiologist who agrees with the above assessment & plan.   - All patient's questions were answered          VTE Risk Mitigation (From admission, onward)        Ordered     heparin infusion 1,000 units/500 ml in 0.9% NaCl (pressure line flush)  Intra-op continuous PRN      04/15/19 1556     heparin 25,000 units in dextrose 5% 250 mL (100 units/mL) infusion LOW INTENSITY nomogram - [facility]  Continuous      04/12/19 0916     heparin 25,000 units in dextrose 5% (100 units/ml) IV bolus from bag - ADDITIONAL PRN BOLUS - 60 units/kg (max bolus 4000 units)  As needed (PRN)      04/12/19 0916     heparin 25,000 units in dextrose 5% (100 units/ml) IV bolus from bag - ADDITIONAL PRN BOLUS - 30 units/kg (max bolus 4000 units)  As needed (PRN)      04/12/19 0916     IP VTE HIGH RISK PATIENT  Once      04/12/19 0440          Thank you for your consult. I will sign off. Please contact us if you have any additional questions.    Héctor Cardenas MD  Interventional Cardiology   [facility]

## 2019-04-15 NOTE — ASSESSMENT & PLAN NOTE
-at home gets dialysis TTS  -Nephrology consulted, appreciate recomendations   ----femoral tunneled cath dose not appear infected, will maintain line due to limited vascular options  ----if pt deteriorates or fails to improve with Abx, then will consider changing out tunneled catheter  ----planning on SLED today, after Cath

## 2019-04-15 NOTE — H&P
"[facility]  Cardiology  History and Physical     Patient Name: Jenni Todd  MRN: [id number]  Admission Date: 4/12/2019  Code Status: Full Code   Attending Provider: Dionne Bullock MD   Primary Care Physician: Michael Tan Iii, MD  Principal Problem:Septic shock    Patient information was obtained from patient and ER records.     Subjective:     Chief Complaint:  Chest Pain     HPI:  Ms. Jenni Todd is a 48 y.o. female w/ CAD s/p 2 PCI (2012), HFrEF 30%, HTN, DM, and ESRD who presented from [facility] for hypotension and fever. Admitted to the MICU for septic Shock. Patient earlier today was getting dialysis and her blood pressure was low. She was also noted to have a fever of 101 and was sent to the ED. Per patient she nanci good with no complaints. She denies any cough, nausea, vomiting or abdominal pain , she complained of feeling dizzy. This was her first episode of fever. Patient stated that she was recently discharged a week ago to SNF after developing  peritonitis. She used to do PD but switched to regular HD after peritonitis. PD cath after becoming septic from peritonitis. Attempts to place tunneled IJ catheters and grafts failed. She has a left femoral catheter placed for HD . Last HD today, took off 1.5 L. Patient reports BP had been low all day since HD, approximately 80 systolic. Her only complaints is "a funny feeling in my center chest" that is not tightness, pressure, or pain.    In the ED patient found hypotensive with lactate > 12.      Hospital Course  Found to have NSTEMI with troponin up to 16, [facility] today with 3 vessel disease 99% LAD, 80%LCX, 100% RCA, LVEDP in 30s, hypotensive, IABP placed. CT surgery consulted.      Past Medical History:   Diagnosis Date    Abnormal finding on Pap smear, HPV DNA positive 2014    Anemia associated with chronic renal failure     on Epogen    Blood type B+     Bulging discs - symptomatic      CAD (coronary artery disease)     " Cardiomyopathy 3/13/2019    Diabetes mellitus, type 2     ESRD (end stage renal disease) 2004    FSGS (focal segmental glomerulosclerosis)     with collapsing    Hyperlipidemia     Hypertension     Neuropathy     NSTEMI (non-ST elevated myocardial infarction) 3/29/2019    Obesity     Secondary hyperparathyroidism, renal     TIA (transient ischemic attack)     Uterine fibroid     small uterine        Past Surgical History:   Procedure Laterality Date    CARDIAC CATHETERIZATION      PCI x 2     SECTION, CLASSIC      COLONOSCOPY N/A 2018    Performed by Rodrigue Russell MD at [facility] ENDO (2ND FLR)    DEBRIDEMENT-WOUND Left 2016    Performed by Teressa Maddox MD at [facility] OR    DIALYSIS FISTULA CREATION      multiple fistulas and grafts before PD     EGD (ESOPHAGOGASTRODUODENOSCOPY) N/A 3/14/2019    Performed by Adolph Davila MD at [facility] ENDO    EGD (ESOPHAGOGASTRODUODENOSCOPY) N/A 3/13/2019    Performed by Adolph Davila MD at [facility] ENDO    INCISION AND DRAINAGE (I&D), LABIAL N/A 2016    Performed by Harmony Fernandez MD at [facility] OR    INCISION AND DRAINAGE (I&D), LABIAL (ADD ON) Left 2016    Performed by Teressa Maddox MD at [facility] OR    INCISION AND DRAINAGE OF WOUND Left     VULVAR ABCESS WITH NECROSIS    Insertion, Catheter, Central Venous, Hemodialysis N/A 3/28/2019    Performed by Kimo Weeks MD at [facility] CATH LAB    Insertion, Catheter, Central Venous, Hemodialysis N/A 3/18/2019    Performed by Kimo Weeks MD at [facility] CATH LAB    INSERTION, CATHETER, TUNNELED ABORTED Left 3/14/2019    Performed by Servando Solis MD at [facility] OR    INSERTION, GRAFT, ARTERIOVENOUS, RIGHT ARM Right 3/22/2019    Performed by BESSIE Wynn III, MD at [facility] OR 2ND FLR    ORIF, HIP Left 2018    Performed by Tevin Grullon MD at [facility] OR    PERITONEAL CATHETER INSERTION      PERMCATH REWIRE- TUNNELED CATH REWIRE Left 2017     Performed by Baldev Munroe MD at [facility] CATH LAB    PERMCATH REWIRE- TUNNELED CATH REWIRE N/A 10/5/2017    Performed by Baldev Munroe MD at [facility] CATH LAB    REMOVAL, CATHETER, DIALYSIS, PERITONEAL N/A 3/14/2019    Performed by [facility] at [facility] OR    UMBILICAL HERNIA REPAIR      Venogram, possible intervention Right 3/20/2019    Performed by BESSIE Wynn III, MD at [facility] CATH LAB       Review of patient's allergies indicates:   Allergen Reactions    Clindamycin Diarrhea    Flagyl [metronidazole hcl]     Metronidazole        No current facility-administered medications on file prior to encounter.      Current Outpatient Medications on File Prior to Encounter   Medication Sig    amLODIPine (NORVASC) 10 MG tablet Take 10 mg by mouth once daily.    aspirin (ECOTRIN) 81 MG EC tablet Take 1 tablet (81 mg total) by mouth once daily.    atorvastatin (LIPITOR) 40 MG tablet Take 40 mg by mouth every evening.     calcitRIOL (ROCALTROL) 0.25 MCG Cap Take 0.5 mcg by mouth once daily.     cinacalcet (SENSIPAR) 90 MG Tab Take 90 mg by mouth once daily.    epoetin adonay 10,000 unit/mL Soln 1 mL, epoetin adonay 20,000 unit/mL Soln 1 mL Inject 30,000 Units into the vein every 14 (fourteen) days.    gabapentin (NEURONTIN) 100 MG capsule 1 capsule 3 (three) times daily.    insulin detemir U-100 (LEVEMIR FLEXTOUCH) 100 unit/mL (3 mL) SubQ InPn pen Inject 10 Units into the skin every evening.    nateglinide (STARLIX) 120 MG tablet STARLIX 120MG 30 MINUTES BEFORE EACH MEAL.    ONETOUCH VERIO Strp USE 1 STRIP ONCE DAILY IN VITRO    pantoprazole (PROTONIX) 40 MG tablet Take 1 tablet (40 mg total) by mouth once daily.    sevelamer carbonate (RENVELA) 800 mg Tab Take 1 tablet (800 mg total) by mouth 3 (three) times daily with meals.    vitamin renal formula, B-complex-vitamin c-folic acid, (B COMPLEX-C-FOLIC ACID) 1 mg Cap Take 1 capsule by mouth once daily. 1 Capsule Oral Every day     Family History      Problem Relation (Age of Onset)    Cancer Maternal Grandmother, Paternal Grandfather    Diabetes Maternal Aunt, Paternal Aunt        Tobacco Use    Smoking status: Never Smoker    Smokeless tobacco: Never Used   Substance and Sexual Activity    Alcohol use: No     Comment: Pt reports some social use of about 1-2 drinks about every six months.    Drug use: No    Sexual activity: Not Currently     Partners: Male     Birth control/protection: None     Review of Systems   Constitution: Negative for chills, decreased appetite and diaphoresis.   HENT: Negative for congestion and ear discharge.    Eyes: Negative for blurred vision and discharge.   Cardiovascular: Negative for chest pain, dyspnea on exertion, irregular heartbeat, leg swelling and paroxysmal nocturnal dyspnea.   Respiratory: Negative for cough, hemoptysis and shortness of breath.    Gastrointestinal: Negative for abdominal pain.     Objective:     Vital Signs (Most Recent):  Temp: 97.6 °F (36.4 °C) (04/15/19 0300)  Pulse: 99 (04/15/19 1730)  Resp: (!) 26 (04/15/19 1730)  BP: 104/74 (04/15/19 1715)  SpO2: 96 % (04/15/19 1730) Vital Signs (24h Range):  Temp:  [97.6 °F (36.4 °C)-98.9 °F (37.2 °C)] 97.6 °F (36.4 °C)  Pulse:  [] 99  Resp:  [1-34] 26  SpO2:  [79 %-100 %] 96 %  BP: ()/(61-79) 104/74     Weight: 97.1 kg (214 lb)  Body mass index is 33.52 kg/m².    SpO2: 96 %  O2 Device (Oxygen Therapy): room air      Intake/Output Summary (Last 24 hours) at 4/15/2019 1748  Last data filed at 4/15/2019 1500  Gross per 24 hour   Intake 1118.42 ml   Output 0 ml   Net 1118.42 ml       Lines/Drains/Airways     Central Venous Catheter Line                 Hemodialysis Catheter 03/28/19 1528 left femoral 18 days          Drain                 Hemodialysis AV Fistula Left upper arm -- days         Hemodialysis AV Graft Right upper arm -- days          Peripheral Intravenous Line                 Peripheral IV - Single Lumen 04/12/19 0155 22 G Right Hand 3  days         Peripheral IV - Single Lumen 04/12/19 0300 20 G Left Forearm 3 days                Physical Exam   Constitutional: She is oriented to person, place, and time. She appears well-developed and well-nourished. No distress.   Eyes: Pupils are equal, round, and reactive to light. Conjunctivae are normal.   Neck: No tracheal deviation present. No thyromegaly present.   Cardiovascular: Normal rate, regular rhythm, normal heart sounds and intact distal pulses. Exam reveals no gallop and no friction rub.   No murmur heard.  IABP R CFA  Dopplerable DP bilaterally   Pulmonary/Chest: Effort normal and breath sounds normal. No respiratory distress. She has no wheezes. She has no rales.   Abdominal: Soft. Bowel sounds are normal. She exhibits no distension. There is no tenderness.   Musculoskeletal: She exhibits no edema or deformity.   Neurological: She is alert and oriented to person, place, and time. No cranial nerve deficit. Coordination normal.   Skin: Skin is warm and dry. She is not diaphoretic.   Psychiatric: She has a normal mood and affect. Her behavior is normal.       Significant Labs:   BMP:   Recent Labs   Lab 04/14/19  2248 04/15/19  0343 04/15/19  1430   * 159* 136*   * 136 136   K 4.8 4.8 4.9    100 100   CO2 23 24 25   BUN 24* 25* 26*   CREATININE 4.9* 5.2* 5.5*   CALCIUM 9.7 9.5 9.8   MG 1.6 2.0 1.9       Significant Imaging: Echocardiogram:   2D echo with color flow doppler:   Results for orders placed or performed during the hospital encounter of 11/03/16   2D echo with color flow doppler   Result Value Ref Range    QEF 60 55 - 65    Mitral Valve Regurgitation MILD     Diastolic Dysfunction No     Aortic Valve Stenosis TRIVIAL TO MILD      Assessment and Plan:     * Septic shock  - Seems to be resolving despite WBC of 15k  - will re-culture  - Afebrile  -Cont antibiotics  - Check lactate      Cardiogenic shock  - Cont IABP 1:1  - trend lactate  - Vol status improving      NSTEMI  (non-ST elevated myocardial infarction)  -c/w ACS protocol (DAPT- ASA/Plavix and heparin drip)   -LV EF of 25%, will initiate GDMT when stable.  -NSTEMI with troponin up to 16, [facility] today with 3 vessel disease 99% LAD, 80%LCX, 100% RCA, LVEDP in 30s, hypotensive, IABP placed. CT surgery consulted for CABG Eval.      ESRD (end stage renal disease) on dialysis  - Cont dialysis via L Femoral dialysis line        VTE Risk Mitigation (From admission, onward)        Ordered     heparin infusion 1,000 units/500 ml in 0.9% NaCl (pressure line flush)  Intra-op continuous PRN      04/15/19 1556     heparin 25,000 units in dextrose 5% 250 mL (100 units/mL) infusion LOW INTENSITY nomogram - [facility]  Continuous      04/12/19 0916     heparin 25,000 units in dextrose 5% (100 units/ml) IV bolus from bag - ADDITIONAL PRN BOLUS - 60 units/kg (max bolus 4000 units)  As needed (PRN)      04/12/19 0916     heparin 25,000 units in dextrose 5% (100 units/ml) IV bolus from bag - ADDITIONAL PRN BOLUS - 30 units/kg (max bolus 4000 units)  As needed (PRN)      04/12/19 0916     IP VTE HIGH RISK PATIENT  Once      04/12/19 0440          Mine Baxter MD  Cardiology   [facility]

## 2019-04-15 NOTE — SUBJECTIVE & OBJECTIVE
Interval History: Overnight no acute events.     Review of Systems   All other systems reviewed and are negative.    Objective:     Vital Signs (Most Recent):  Temp: 98.9 °F (37.2 °C) (04/14/19 1901)  Pulse: 104 (04/15/19 0309)  Resp: 16 (04/15/19 0309)  BP: 95/61 (04/15/19 0200)  SpO2: 96 % (04/15/19 0309) Vital Signs (24h Range):  Temp:  [98.9 °F (37.2 °C)] 98.9 °F (37.2 °C)  Pulse:  [104-110] 104  Resp:  [16-31] 16  SpO2:  [79 %-100 %] 96 %  BP: ()/(61-77) 95/61     Weight: 97.1 kg (214 lb)  Body mass index is 33.52 kg/m².     SpO2: 96 %  O2 Device (Oxygen Therapy): room air      Intake/Output Summary (Last 24 hours) at 4/15/2019 0315  Last data filed at 4/15/2019 0200  Gross per 24 hour   Intake 708.37 ml   Output 0 ml   Net 708.37 ml       Lines/Drains/Airways     Central Venous Catheter Line                 Hemodialysis Catheter 03/28/19 1528 left femoral 17 days          Drain                 Hemodialysis AV Fistula Left upper arm -- days         Hemodialysis AV Graft Right upper arm -- days          Peripheral Intravenous Line                 Peripheral IV - Single Lumen 04/12/19 0155 22 G Right Hand 3 days         Peripheral IV - Single Lumen 04/12/19 0300 20 G Left Forearm 3 days                Physical Exam   Constitutional: She is oriented to person, place, and time. She appears well-developed and well-nourished. No distress.   HENT:   Head: Normocephalic and atraumatic.   Mouth/Throat: Oropharynx is clear and moist.   Eyes: Pupils are equal, round, and reactive to light. EOM are normal.   Neck: Normal range of motion. Neck supple. No JVD present.   Cardiovascular: Normal rate and regular rhythm. Exam reveals no gallop and no friction rub.   No murmur heard.  Pulmonary/Chest: Effort normal and breath sounds normal. No respiratory distress. She has no wheezes. She has no rales. She exhibits no tenderness.   Abdominal: Soft. Bowel sounds are normal. She exhibits no distension. There is no tenderness.    Musculoskeletal: Normal range of motion. She exhibits no edema.   Lymphadenopathy:     She has no cervical adenopathy.   Neurological: She is alert and oriented to person, place, and time.   Skin: Skin is warm and dry. No erythema.   Psychiatric: She has a normal mood and affect. Her behavior is normal. Judgment and thought content normal.       Significant Labs: All pertinent lab results from the last 24 hours have been reviewed.    Significant Imaging: Echocardiogram:   2D echo with color flow doppler:   Results for orders placed or performed during the hospital encounter of 11/03/16   2D echo with color flow doppler   Result Value Ref Range    QEF 60 55 - 65    Mitral Valve Regurgitation MILD     Diastolic Dysfunction No     Aortic Valve Stenosis TRIVIAL TO MILD

## 2019-04-15 NOTE — SUBJECTIVE & OBJECTIVE
Interval History:   NAEON.  Last had SLED on Saturday.  Plans for [facility] today per cardiology.      Review of patient's allergies indicates:   Allergen Reactions    Clindamycin Diarrhea    Flagyl [metronidazole hcl]     Metronidazole      Current Facility-Administered Medications   Medication Frequency    0.9%  NaCl infusion (for blood administration) Q24H PRN    albuterol-ipratropium 2.5 mg-0.5 mg/3 mL nebulizer solution 3 mL Q4H PRN    aspirin EC tablet 81 mg Daily    atorvastatin tablet 40 mg QHS    benzonatate capsule 100 mg TID PRN    calcitRIOL capsule 0.5 mcg Daily    clopidogrel tablet 75 mg Daily    dextrose 50% injection 12.5 g PRN    dextrose 50% injection 25 g PRN    glucagon (human recombinant) injection 1 mg PRN    glucose chewable tablet 16 g PRN    glucose chewable tablet 24 g PRN    heparin 25,000 units in dextrose 5% (100 units/ml) IV bolus from bag - ADDITIONAL PRN BOLUS - 30 units/kg (max bolus 4000 units) PRN    heparin 25,000 units in dextrose 5% (100 units/ml) IV bolus from bag - ADDITIONAL PRN BOLUS - 60 units/kg (max bolus 4000 units) PRN    heparin 25,000 units in dextrose 5% 250 mL (100 units/mL) infusion LOW INTENSITY nomogram - [facility] Continuous    insulin aspart U-100 pen 0-5 Units QID (AC + HS) PRN    loperamide capsule 2 mg TID PRN    magnesium sulfate 2g in water 50mL IVPB (premix) PRN    norepinephrine 4 mg in dextrose 5% 250 mL infusion (premix) (titrating) Continuous    pantoprazole EC tablet 40 mg Daily    piperacillin-tazobactam 4.5 g in sodium chloride 0.9% 100 mL IVPB (ready to mix system) Q12H    sevelamer carbonate tablet 800 mg TID WM    sodium chloride 0.9% flush 10 mL PRN       Objective:     Vital Signs (Most Recent):  Temp: 97.6 °F (36.4 °C) (04/15/19 0300)  Pulse: 103 (04/15/19 1130)  Resp: 19 (04/15/19 1130)  BP: 102/61 (04/15/19 1100)  SpO2: 97 % (04/15/19 1130)  O2 Device (Oxygen Therapy): room air (04/15/19 0828) Vital Signs (24h Range):  Temp:   [97.6 °F (36.4 °C)-98.9 °F (37.2 °C)] 97.6 °F (36.4 °C)  Pulse:  [102-112] 103  Resp:  [13-34] 19  SpO2:  [79 %-100 %] 97 %  BP: ()/(61-79) 102/61     Weight: 97.1 kg (214 lb) (04/14/19 1815)  Body mass index is 33.52 kg/m².  Body surface area is 2.14 meters squared.    I/O last 3 completed shifts:  In: 2782.7 [P.O.:360; I.V.:2222.7; IV Piggyback:200]  Out: 724 [Other:724]    Physical Exam   Constitutional: She is oriented to person, place, and time. She appears well-developed and well-nourished. No distress.   HENT:   Head: Normocephalic and atraumatic.   Right Ear: External ear normal.   Left Ear: External ear normal.   Eyes: Conjunctivae and EOM are normal. Right eye exhibits no discharge. Left eye exhibits no discharge.   Neck: Normal range of motion. Neck supple.   Cardiovascular: Normal rate and regular rhythm. Exam reveals no gallop and no friction rub.   No murmur heard.  Pulmonary/Chest: Effort normal and breath sounds normal. No respiratory distress. She has no wheezes. She has no rales.   Abdominal: Soft. Bowel sounds are normal. She exhibits no distension. There is no tenderness.   Musculoskeletal: Normal range of motion. She exhibits no edema or deformity.   Neurological: She is alert and oriented to person, place, and time.   Skin: Skin is warm and dry. She is not diaphoretic.   L femoral tunneled dialysis catheter   Psychiatric: She has a normal mood and affect. Her behavior is normal.       Significant Labs:  CBC:   Recent Labs   Lab 04/15/19  0343   WBC 12.39   RBC 2.35*   HGB 7.1*   HCT 21.9*      MCV 93   MCH 30.2   MCHC 32.4     CMP:   Recent Labs   Lab 04/15/19  0343   *   CALCIUM 9.5   ALBUMIN 1.7*  1.7*   PROT 6.8      K 4.8   CO2 24      BUN 25*   CREATININE 5.2*   ALKPHOS 112   *   *   BILITOT 0.3

## 2019-04-15 NOTE — PLAN OF CARE
Michael Tan Iii, MD  [address] / [place] [address]    [id number] - [place] - [address]  [address]  [facility] [address]  Phone: [phone] Fax: [phone]    [facility] (ESRD Bundle Only) - [place] - [address]  [address]  [place] [address]  Phone: [phone] Fax: [phone]  Payor: MEDICARE / Plan: MEDICARE PART A & B / Product Type: Government /   Future Appointments   Date Time Provider Department Center   4/29/2019  2:30 PM CARDIAC, PET IMAGING [facility] DAVID Morel   5/6/2019 11:40 AM Calos Yousif MD [facility] CARDIO [facility]     Extended Emergency Contact Information  Primary Emergency Contact: Sari Schafer  Address: [address] [place] [address] [facility]  Home Phone: [phone]  Work Phone: [phone]  Mobile Phone: [phone]  Relation: Sister  Secondary Emergency Contact: Rock Todd   [facility]  Home Phone: [phone]  Mobile Phone: [phone]  Relation: Father     04/15/19 1245   Discharge Assessment   Assessment Type Discharge Planning Assessment   Confirmed/corrected address and phone number on facesheet? Yes   Assessment information obtained from? Patient   Expected Length of Stay (days) 4   Communicated expected length of stay with patient/caregiver yes   Prior to hospitilization cognitive status: Alert/Oriented   Prior to hospitalization functional status: Independent;Assistive Equipment   Current cognitive status: Alert/Oriented   Current Functional Status: Assistive Equipment;Needs Assistance   Facility Arrived From: [facility]   Lives With other relative(s)  (lives with stepdaughter;  father lives within 1 mile and always available)   Able to Return to Prior Arrangements other (see comments)  (to be determined)   Is patient able to care for self after discharge? Unable to determine at this time (comments)   Who are your caregiver(s)  and their phone number(s)? Rock Todd [phone]       Patient's perception of discharge disposition skilled nursing facility   Readmission Within the Last 30 Days current reason for admission unrelated to previous admission   If yes, most recent facility name: [facility]   Patient currently being followed by outpatient case management? No   Patient currently receives home health services? No  (previously established with [facility])   Patient currently receives any other outside agency services? No   Equipment Currently Used at Home walker, rolling;wheelchair;shower chair;rollator   Do you have any problems affording any of your prescribed medications? No   Is the patient taking medications as prescribed? yes   Does the patient have transportation home? Yes   Transportation Anticipated family or friend will provide   Dialysis [facility] and [facility]uled [facility] and [facility] to change to [facility]   Does the patient receive services at the Coumadin Clinic? No   Discharge Plan A Skilled Nursing Facility   Discharge Plan B Home;Home Health   DME Needed Upon Discharge    (to be determined)   Patient/Family in Agreement with Plan yes   Does the patient currently use HME? Yes   Does the patient receive outpatient dialysis? Yes   Are there any open cases? No   CM  To bedside.  Discussed CM role and discharge planning.  Patient [facility]admitted from [facility].   Plan for ischemia workup left heart cath;  Needs to be determined.  Derek Parmar RN Case Manager     [phone]

## 2019-04-15 NOTE — ASSESSMENT & PLAN NOTE
- Seems to be resolving despite WBC of 15k  - will re-culture  - Afebrile  -Cont antibiotics  - Check lactate

## 2019-04-15 NOTE — ASSESSMENT & PLAN NOTE
ESRD on iHD TTS  C-Deckbar  L femoral tunneled catheter (3/28)    49 yo female well known to our service who has been dialysis dependent for >15 years, who presents from SNF for hypotension and fever after hemodialysis on Thursday.  Nephrology has been consulted for ESRD management while in-patient.    Plan/recommendations:  -No need for dialysis for today, but most likely require treatment for tomorrow (SLED/HD)  -Currently off pressors today   -Dressing removed from femoral catheter, negative for signs of infection.  No exudate appreciated from tunnel/exit site.  -recommend treating with Abx and keeping current catheter due to limited vascular options.  If clinical conditions deteriorates or fails to improve with Abx alone, then will consider changing out tunneled catheter.

## 2019-04-15 NOTE — ASSESSMENT & PLAN NOTE
ACS protocol started  (DAPT- ASA/Plavix and heparin drip)   -LV EF of 40%, will need to initiate GDMT (beta-blocker, ACE-I or ARB) but currently her BP is on the low side and is likely  going to need SLED   Interventional Cardiology consult, could be taking patient to cath today .

## 2019-04-15 NOTE — HPI
"Ms. Jenni Todd is a 48 y.o. female w/ CAD s/p 2 PCI (2012), HFrEF 30%, HTN, DM, and ESRD who presented from [facility] for hypotension and fever. Admitted to the MICU for septic Shock. Patient earlier today was getting dialysis and her blood pressure was low. She was also noted to have a fever of 101 and was sent to the ED. Per patient she nanci good with no complaints. She denies any cough, nausea, vomiting or abdominal pain , she complained of feeling dizzy. This was her first episode of fever. Patient stated that she was recently discharged a week ago to [facility] after developing  peritonitis. She used to do PD but switched to regular HD after peritonitis. PD cath after becoming septic from peritonitis. Attempts to place tunneled IJ catheters and grafts failed. She has a left femoral catheter placed for HD . Last HD today, took off 1.5 L. Patient reports BP had been low all day since HD, approximately 80 systolic. Her only complaints is "a funny feeling in my center chest" that is not tightness, pressure, or pain.    In the ED patient found hypotensive with lactate > 12.     Hospital Course  Found to have NSTEMI with troponin up to 16, [facility] today with 3 vessel disease 99% LAD, 80%LCX, 100% RCA, LVEDP in 30s, hypotensive, IABP placed. CT surgery consulted.    "

## 2019-04-15 NOTE — ASSESSMENT & PLAN NOTE
-c/w ACS protocol (DAPT- ASA/Plavix and heparin drip)   -LV EF of 40%, need to initiate GDMT (beta-blocker, ACE-I or ARB)   -Interventional Cardiology consulted for ischemic workup, possible LHC today

## 2019-04-15 NOTE — SUBJECTIVE & OBJECTIVE
Past Medical History:   Diagnosis Date    Abnormal finding on Pap smear, HPV DNA positive     Anemia associated with chronic renal failure     on Epogen    Blood type B+     Bulging discs - symptomatic      CAD (coronary artery disease)     Cardiomyopathy 3/13/2019    Diabetes mellitus, type 2     ESRD (end stage renal disease) 2004    FSGS (focal segmental glomerulosclerosis)     with collapsing    Hyperlipidemia     Hypertension     Neuropathy     NSTEMI (non-ST elevated myocardial infarction) 3/29/2019    Obesity     Secondary hyperparathyroidism, renal     TIA (transient ischemic attack)     Uterine fibroid     small uterine        Past Surgical History:   Procedure Laterality Date    CARDIAC CATHETERIZATION      PCI x 2     SECTION, CLASSIC      COLONOSCOPY N/A 2018    Performed by Rodrigue Russell MD at [facility] ENDO (2ND FLR)    DEBRIDEMENT-WOUND Left 2016    Performed by Teressa Maddox MD at [facility] OR    DIALYSIS FISTULA CREATION      multiple fistulas and grafts before PD     EGD (ESOPHAGOGASTRODUODENOSCOPY) N/A 3/14/2019    Performed by Adolph Davila MD at [facility] ENDO    EGD (ESOPHAGOGASTRODUODENOSCOPY) N/A 3/13/2019    Performed by Adolph Davila MD at [facility] ENDO    INCISION AND DRAINAGE (I&D), LABIAL N/A 2016    Performed by Harmony Fernandez MD at [facility] OR    INCISION AND DRAINAGE (I&D), LABIAL (ADD ON) Left 2016    Performed by Teressa Maddox MD at [facility] OR    INCISION AND DRAINAGE OF WOUND Left     VULVAR ABCESS WITH NECROSIS    Insertion, Catheter, Central Venous, Hemodialysis N/A 3/28/2019    Performed by Kimo Weeks MD at [facility] CATH LAB    Insertion, Catheter, Central Venous, Hemodialysis N/A 3/18/2019    Performed by Kimo Weeks MD at [facility] CATH LAB    INSERTION, CATHETER, TUNNELED ABORTED Left 3/14/2019    Performed by Servando Solis MD at [facility] OR    INSERTION, GRAFT, ARTERIOVENOUS, RIGHT ARM Right  3/22/2019    Performed by BESSIE Wynn III, MD at [facility] OR 2ND FLR    ORIF, HIP Left 9/13/2018    Performed by Tevin Grullon MD at [facility] OR    PERITONEAL CATHETER INSERTION      PERMCATH REWIRE- TUNNELED CATH REWIRE Left 11/13/2017    Performed by Baldev Munroe MD at [facility] CATH LAB    PERMCATH REWIRE- TUNNELED CATH REWIRE N/A 10/5/2017    Performed by Baldev Munroe MD at [facility] CATH LAB    REMOVAL, CATHETER, DIALYSIS, PERITONEAL N/A 3/14/2019    Performed by [facility]ando Solis [facility] at [facility] OR    UMBILICAL HERNIA REPAIR      Venogram, possible intervention Right 3/20/2019    Performed by BESSIE Wynn III, MD at [facility] CATH LAB       Review of patient's allergies indicates:   Allergen Reactions    Clindamycin Diarrhea    Flagyl [metronidazole hcl]     Metronidazole        Family History     Problem Relation (Age of Onset)    Cancer Maternal Grandmother, Paternal Grandfather    Diabetes Maternal Aunt, Paternal Aunt        Tobacco Use    Smoking status: Never Smoker    Smokeless tobacco: Never Used   Substance and Sexual Activity    Alcohol use: No     Comment: Pt reports some social use of about 1-2 drinks about every six months.    Drug use: No    Sexual activity: Not Currently     Partners: Male     Birth control/protection: None      Review of Systems   Constitutional: Negative for chills, fatigue and fever.   HENT: Negative for congestion.    Respiratory: Negative for chest tightness and shortness of breath.    Cardiovascular: Negative for chest pain, palpitations and leg swelling.   Gastrointestinal: Negative for abdominal pain.   Genitourinary: Negative for pelvic pain.   Musculoskeletal: Negative for back pain.   Skin: Negative for color change, pallor, rash and wound.   Neurological: Negative for dizziness and light-headedness.   Psychiatric/Behavioral: Negative for agitation.     Objective:     Vital Signs (Most Recent):  Temp: 97.6 °F (36.4 °C) (04/15/19 0300)  Pulse: 103 (04/15/19  1300)  Resp: (!) 24 (04/15/19 1300)  BP: 100/63 (04/15/19 1300)  SpO2: 98 % (04/15/19 1300) Vital Signs (24h Range):  Temp:  [97.6 °F (36.4 °C)-98.9 °F (37.2 °C)] 97.6 °F (36.4 °C)  Pulse:  [102-112] 103  Resp:  [13-34] 24  SpO2:  [79 %-100 %] 98 %  BP: ()/(61-79) 100/63   Weight: 97.1 kg (214 lb)  Body mass index is 33.52 kg/m².      Intake/Output Summary (Last 24 hours) at 4/15/2019 1320  Last data filed at 4/15/2019 1300  Gross per 24 hour   Intake 1001.82 ml   Output 0 ml   Net 1001.82 ml       Physical Exam   Constitutional: She is oriented to person, place, and time. She appears well-developed and well-nourished. No distress.   HENT:   Head: Normocephalic and atraumatic.   Right Ear: External ear normal.   Left Ear: External ear normal.   Eyes: Conjunctivae and EOM are normal. Right eye exhibits no discharge. Left eye exhibits no discharge.   Neck: Normal range of motion. Neck supple.   Cardiovascular: Normal rate and regular rhythm. Exam reveals no gallop and no friction rub.   No murmur heard.  Pulmonary/Chest: Effort normal and breath sounds normal. No respiratory distress. She has no wheezes. She has no rales.   Abdominal: Soft. Bowel sounds are normal. She exhibits no distension. There is no tenderness.   Musculoskeletal: Normal range of motion. She exhibits no edema or deformity.   Neurological: She is alert and oriented to person, place, and time.   Skin: Skin is warm and dry. She is not diaphoretic.   L femoral tunneled dialysis catheter   Psychiatric: She has a normal mood and affect. Her behavior is normal.       Vents:     Lines/Drains/Airways     Central Venous Catheter Line                 Hemodialysis Catheter 03/28/19 1528 left femoral 17 days          Drain                 Hemodialysis AV Fistula Left upper arm -- days         Hemodialysis AV Graft Right upper arm -- days          Peripheral Intravenous Line                 Peripheral IV - Single Lumen 04/12/19 0155 22 G Right Hand 3 days          Peripheral IV - Single Lumen 04/12/19 0300 20 G Left Forearm 3 days              Significant Labs:    CBC/Anemia Profile:  Recent Labs   Lab 04/14/19  0040 04/15/19  0343   WBC 15.64* 12.39   HGB 7.5* 7.1*   HCT 23.3* 21.9*    263   MCV 95 93   RDW 17.1* 17.3*        Chemistries:  Recent Labs   Lab 04/13/19 2039 04/14/19  2248 04/15/19  0343    135* 136   K 4.4 4.8 4.8    100 100   CO2 26 23 24   BUN 18 24* 25*   CREATININE 3.5* 4.9* 5.2*   CALCIUM 9.1 9.7 9.5   ALBUMIN 1.7* 1.6* 1.7*  1.7*   PROT  --   --  6.8   BILITOT  --   --  0.3   ALKPHOS  --   --  112   ALT  --   --  348*   AST  --   --  143*   MG 1.6 1.6 2.0   PHOS 3.6 5.4* 5.6*       All pertinent labs within the past 24 hours have been reviewed.    Significant Imaging: I have reviewed all pertinent imaging results/findings within the past 24 hours.  I have reviewed and interpreted all pertinent imaging results/findings within the past 24 hours.

## 2019-04-15 NOTE — PLAN OF CARE
Problem: Adult Inpatient Plan of Care  Goal: Plan of Care Review  Outcome: Ongoing (interventions implemented as appropriate)  No acute events throughout day. See vital signs and assessments in flowsheets. See below for updates on today's progress.     Pulmonary: On RA.o2 sat %. PRN tessalon pearls and duo-neb given overnight.     Cardiovascular: Sinus tach. HR 100s. Levo stopped @21:47. SBP 90-110s. MAP >65.    Neurological: AAOX. Afebrile.     Gastrointestinal: NPO since midnight. Imodium PRN given x1 for CO diarrhea.     Genitourinary: Anuric.    Endocrine: Bilateral UE dialysis graft. Both absent of trill/bruit.     Integumentary/Other: Abd wound dressings changed during shift. Stage 1 to sacrum heeling. Wound care to follow.      Infusions: Heparin. KVO.     Patient progressing towards goals as tolerated, plan of care communicated and reviewed with Jenni Todd. POC for angiogram on Monday and possible step down. All concerns addressed. Will continue to monitor.

## 2019-04-15 NOTE — PROGRESS NOTES
[facility]  Cardiology  Progress Note    Patient Name: Jenni Todd  MRN: [id number]  Admission Date: 4/12/2019  Hospital Length of Stay: 3 days  Code Status: Full Code   Attending Physician: Jewel Montenegro MD   Primary Care Physician: Michael Tan Iii, MD  Expected Discharge Date:   Principal Problem:Septic shock    Subjective:       Interval History: Levo discontinued @ 2147 last night. Pt has no new complaints. She denies CP. Endorses SOB with coughing episodes. Possible LHC today.     Review of Systems   Constitution: Negative for chills and fever.   Cardiovascular: Negative for chest pain and palpitations.   Respiratory: Positive for cough and shortness of breath (only with cough).    Gastrointestinal: Negative for nausea and vomiting.     Objective:     Vital Signs (Most Recent):  Temp: 97.6 °F (36.4 °C) (04/15/19 0300)  Pulse: 102 (04/15/19 0828)  Resp: 16 (04/15/19 0828)  BP: 111/79 (04/15/19 0800)  SpO2: 100 % (04/15/19 0828) Vital Signs (24h Range):  Temp:  [97.6 °F (36.4 °C)-98.9 °F (37.2 °C)] 97.6 °F (36.4 °C)  Pulse:  [102-112] 102  Resp:  [13-34] 16  SpO2:  [79 %-100 %] 100 %  BP: ()/(61-79) 111/79     Weight: 97.1 kg (214 lb)  Body mass index is 33.52 kg/m².     SpO2: 100 %  O2 Device (Oxygen Therapy): room air      Intake/Output Summary (Last 24 hours) at 4/15/2019 0931  Last data filed at 4/15/2019 0800  Gross per 24 hour   Intake 778.32 ml   Output 0 ml   Net 778.32 ml       Lines/Drains/Airways     Central Venous Catheter Line                 Hemodialysis Catheter 03/28/19 1528 left femoral 17 days          Drain                 Hemodialysis AV Fistula Left upper arm -- days         Hemodialysis AV Graft Right upper arm -- days          Peripheral Intravenous Line                 Peripheral IV - Single Lumen 04/12/19 0155 22 G Right Hand 3 days         Peripheral IV - Single Lumen 04/12/19 0300 20 G Left Forearm 3 days                Physical Exam    Constitutional: She is oriented to person, place, and time. She appears well-developed. No distress.   HENT:   Head: Normocephalic and atraumatic.   Eyes: Conjunctivae are normal. No scleral icterus.   Neck: Neck supple. No JVD present.   Cardiovascular: Regular rhythm. Tachycardia present.   Pulmonary/Chest: Effort normal. No respiratory distress.   Fine bibasilar crackles    Abdominal: Soft. Bowel sounds are normal. She exhibits no distension. There is no tenderness.   Musculoskeletal: Normal range of motion. She exhibits no edema.   Neurological: She is alert and oriented to person, place, and time.   Skin: Skin is warm and dry.   Psychiatric: She has a normal mood and affect. Her behavior is normal. Judgment and thought content normal.       Significant Labs:     CMP   Recent Labs   Lab 198 04/15/19  0343    135* 136   K 4.4 4.8 4.8    100 100   CO2 26 23 24   * 184* 159*   BUN 18 24* 25*   CREATININE 3.5* 4.9* 5.2*   CALCIUM 9.1 9.7 9.5   PROT  --   --  6.8   ALBUMIN 1.7* 1.6* 1.7*  1.7*   BILITOT  --   --  0.3   ALKPHOS  --   --  112   AST  --   --  143*   ALT  --   --  348*   ANIONGAP 10 12 12   ESTGFRAFRICA 16.8* 11.2* 10.4*   EGFRNONAA 14.6* 9.7* 9.0*     CBC   Recent Labs   Lab 19  0040 04/15/19  0343   WBC 15.64* 12.39   HGB 7.5* 7.1*   HCT 23.3* 21.9*    263     Significant ImaginD echo with color flow doppler:         Results for orders placed or performed during the hospital encounter of 16   2D echo with color flow doppler   Result Value Ref Range     QEF 60 55 - 65     Mitral Valve Regurgitation MILD       Diastolic Dysfunction No       Aortic Valve Stenosis TRIVIAL TO MILD             Assessment and Plan:       NSTEMI (non-ST elevated myocardial infarction)  -c/w ACS protocol (DAPT- ASA/Plavix and heparin drip)   -LV EF of 40%, need to initiate GDMT (beta-blocker, ACE-I or ARB)   -Interventional Cardiology consulted for  ischemic workup, possible LHC today        VTE Risk Mitigation (From admission, onward)        Ordered     heparin 25,000 units in dextrose 5% 250 mL (100 units/mL) infusion LOW INTENSITY nomogram - [facility]  Continuous      04/12/19 0916     heparin 25,000 units in dextrose 5% (100 units/ml) IV bolus from bag - ADDITIONAL PRN BOLUS - 60 units/kg (max bolus 4000 units)  As needed (PRN)      04/12/19 0916     heparin 25,000 units in dextrose 5% (100 units/ml) IV bolus from bag - ADDITIONAL PRN BOLUS - 30 units/kg (max bolus 4000 units)  As needed (PRN)      04/12/19 0916     IP VTE HIGH RISK PATIENT  Once      04/12/19 0440          Shana Alegria MD  Cardiology  [facility]

## 2019-04-15 NOTE — ASSESSMENT & PLAN NOTE
Patient presents with NSTEMI most likely the result of progression of CAD (ESRD, obesity, prior CAD), ddx from hypotension post-dialysis. Infectious workup has been negative thus far.    - plan on 7Fr slender right groin access  - optimize medical managmenet as per prior cardiology recs with ARB/Entresto, beta blocker, DAPT, statin    - The risks, benefits & alternatives of the procedure were explained to the patient.    - The risks of coronary angiography include but are not limited to:  Bleeding, infection, heart rhythm abnormalities, allergic reactions, kidney injury, stroke and death.    - Should stenting be indicated, the patient has agreed to dual anti-platelet therapy for one consecutive year with a drug-eluting stent and a minimum of 1-month with the use of a bare metal stent.    - The risks of moderate sedation include hypotension, respiratory depression, arrhythmias, bronchospasm, & death.    - Informed consent was obtained & the patient is agreeable to proceed with the procedure.  - This patient was discussed with the attending interventional cardiologist who agrees with the above assessment & plan.   - All patient's questions were answered

## 2019-04-15 NOTE — PROGRESS NOTES
[facility]  Nephrology  Progress Note    Patient Name: Jenni Todd  MRN: [id number]  Admission Date: 4/12/2019  Hospital Length of Stay: 2 days  Attending Provider: Mandeep Angeles*   Primary Care Physician: Michael Tan Iii, MD  Principal Problem:Septic shock    Subjective:     HPI: Ms. Todd is a 47 yo female with CAD s/p 2 PCI (2012), HFrEF 30%, HTN, DM, and ESRD who presented from [facility] for hypotension and fever.  She reports going to dialysis on 4/11 in which 1.7L of fluid was removed.  She had hypotension during the treatment, but remained asymptomatic.  She was transferred back to [facility] afterwards where her blood pressures continued to run low, but patient started becoming symptomatic with chest pressure, lightheadedness and SOB.  Nursing staff at the [facility] were unable to obtain a blood pressure despite IVF bolus and she was then transferred to [facility] for further management.  She had a fever of 101, which resolved with tylenol administration per her account.  In the ED, she was found to have continued hypotension and was started on levo for BP support.  Lactate elevated (>12) and she was bolused with 1.5L of NS.  VBG with pH of 7.3. She denies any fever/chills during dialysis. Nephrology has been consulted for ESRD management while in-patient.    She used to be on home CCPD, admitted to [facility] on 2/21 for peritonitis. PD catheter removed due to hx of recurrent peritonitis and she was transitioned to HD. Bilateral IJ tunneled catheters were attempted without success due to thrombus. Now has a temporary dialysis catheter in her left fem that was placed 3/28 by ALONZO.  Prior to permacath placement, she had a next day graft placed by Dr. Leger on 3/22, but it was never used and was clotted 1 day post-op.  Patient refused to have the graft declotted, so the permacatch was placed at that time.          Interval History:   Patient evaluated at bedside, in which yesterday was started  on SLED for clearance and slight volume removal. She clotted after 7 hrs. NET positive 2 L yesterday. Currently no oxygen requirement and denies any SOB.     Review of patient's allergies indicates:   Allergen Reactions    Clindamycin Diarrhea    Flagyl [metronidazole hcl]     Metronidazole      Current Facility-Administered Medications   Medication Frequency    0.9%  NaCl infusion (for blood administration) Q24H PRN    albuterol-ipratropium 2.5 mg-0.5 mg/3 mL nebulizer solution 3 mL Q4H PRN    aspirin EC tablet 81 mg Daily    atorvastatin tablet 40 mg QHS    benzonatate capsule 100 mg TID PRN    calcitRIOL capsule 0.5 mcg Daily    clopidogrel tablet 75 mg Daily    dextrose 50% injection 12.5 g PRN    dextrose 50% injection 25 g PRN    gabapentin capsule 100 mg Once    glucagon (human recombinant) injection 1 mg PRN    glucose chewable tablet 16 g PRN    glucose chewable tablet 24 g PRN    heparin 25,000 units in dextrose 5% (100 units/ml) IV bolus from bag - ADDITIONAL PRN BOLUS - 30 units/kg (max bolus 4000 units) PRN    heparin 25,000 units in dextrose 5% (100 units/ml) IV bolus from bag - ADDITIONAL PRN BOLUS - 60 units/kg (max bolus 4000 units) PRN    heparin 25,000 units in dextrose 5% 250 mL (100 units/mL) infusion LOW INTENSITY nomogram - [facility] Continuous    insulin aspart U-100 pen 0-5 Units QID (AC + HS) PRN    loperamide capsule 2 mg TID PRN    norepinephrine 4 mg in dextrose 5% 250 mL infusion (premix) (titrating) Continuous    pantoprazole EC tablet 40 mg Daily    piperacillin-tazobactam 4.5 g in sodium chloride 0.9% 100 mL IVPB (ready to mix system) Q12H    sevelamer carbonate tablet 800 mg TID WM       Objective:     Vital Signs (Most Recent):  Temp: 99.3 °F (37.4 °C) (04/13/19 2300)  Pulse: 106 (04/14/19 2042)  Resp: (!) 25 (04/14/19 2042)  BP: 99/70 (04/14/19 1815)  SpO2: 98 % (04/14/19 2042)  O2 Device (Oxygen Therapy): room air (04/14/19 2042) Vital Signs (24h Range):  Temp:   [99.3 °F (37.4 °C)] 99.3 °F (37.4 °C)  Pulse:  [106-126] 106  Resp:  [21-32] 25  SpO2:  [79 %-98 %] 98 %  BP: ()/(52-83) 99/70     Weight: 97.1 kg (214 lb) (04/14/19 1815)  Body mass index is 33.52 kg/m².  Body surface area is 2.14 meters squared.    I/O last 3 completed shifts:  In: 3610.5 [P.O.:420; I.V.:2718.4; IV Piggyback:472.1]  Out: 1251 [Other:1251]    Physical Exam   Constitutional: She is oriented to person, place, and time. She appears well-developed and well-nourished. No distress.   HENT:   Head: Normocephalic and atraumatic.   Right Ear: External ear normal.   Left Ear: External ear normal.   Eyes: Conjunctivae and EOM are normal. Right eye exhibits no discharge. Left eye exhibits no discharge.   Neck: Normal range of motion. Neck supple.   Cardiovascular: Normal rate and regular rhythm. Exam reveals no gallop and no friction rub.   No murmur heard.  Pulmonary/Chest: Effort normal and breath sounds normal. No respiratory distress. She has no wheezes. She has no rales.   Abdominal: Soft. Bowel sounds are normal. She exhibits no distension. There is no tenderness.   Musculoskeletal: Normal range of motion. She exhibits no edema or deformity.   Neurological: She is alert and oriented to person, place, and time.   Skin: Skin is warm and dry. She is not diaphoretic.   L femoral tunneled dialysis catheter   Psychiatric: She has a normal mood and affect. Her behavior is normal.       Significant Labs:  ABGs:   Recent Labs   Lab 04/12/19  0752   PH 7.301*   PCO2 47.1*   HCO3 23.2*   POCSATURATED 31*   BE -3     BMP:   Recent Labs   Lab 04/13/19  2039   *      CO2 26   BUN 18   CREATININE 3.5*   CALCIUM 9.1   MG 1.6     CBC:   Recent Labs   Lab 04/14/19  0040   WBC 15.64*   RBC 2.45*   HGB 7.5*   HCT 23.3*      MCV 95   MCH 30.6   MCHC 32.2     CMP:   Recent Labs   Lab 04/13/19  0642  04/13/19 2039   *   < > 145*   CALCIUM 7.4*   < > 9.1   ALBUMIN 1.4*  1.4*   < > 1.7*   PROT  5.4*  5.4*  --   --       < > 138   K 3.9   < > 4.4   CO2 17*   < > 26      < > 102   BUN 25*   < > 18   CREATININE 4.4*   < > 3.5*   ALKPHOS 96  96  --   --    *  301*  --   --    *  354*  --   --    BILITOT 0.1  0.1  --   --     < > = values in this interval not displayed.     All labs within the past 24 hours have been reviewed.       Assessment/Plan:     ESRD (end stage renal disease) on dialysis  ESRD on iHD TTS  [facility]  L femoral tunneled catheter (3/28)    47 yo female well known to our service who has been dialysis dependent for >15 years, who presents from SNF for hypotension and fever after hemodialysis on Thursday.  Nephrology has been consulted for ESRD management while in-patient.    Plan/recommendations:  -No need for dialysis for today, but most likely require treatment for tomorrow (SLED/HD)  -Currently off pressors today   -Dressing removed from femoral catheter, negative for signs of infection.  No exudate appreciated from tunnel/exit site.  -recommend treating with Abx and keeping current catheter due to limited vascular options.  If clinical conditions deteriorates or fails to improve with Abx alone, then will consider changing out tunneled catheter.        Lux Estes  Nephrology  Fellow  [facility]    Pager [phone]

## 2019-04-15 NOTE — SUBJECTIVE & OBJECTIVE
Past Medical History:   Diagnosis Date    Abnormal finding on Pap smear, HPV DNA positive     Anemia associated with chronic renal failure     on Epogen    Blood type B+     Bulging discs - symptomatic      CAD (coronary artery disease)     Cardiomyopathy 3/13/2019    Diabetes mellitus, type 2     ESRD (end stage renal disease) 2004    FSGS (focal segmental glomerulosclerosis)     with collapsing    Hyperlipidemia     Hypertension     Neuropathy     NSTEMI (non-ST elevated myocardial infarction) 3/29/2019    Obesity     Secondary hyperparathyroidism, renal     TIA (transient ischemic attack)     Uterine fibroid     small uterine        Past Surgical History:   Procedure Laterality Date    CARDIAC CATHETERIZATION      PCI x 2     SECTION, CLASSIC      COLONOSCOPY N/A 2018    Performed by Rodrigue Russell MD at [facility] ENDO (2ND FLR)    DEBRIDEMENT-WOUND Left 2016    Performed by Teressa Maddox MD at [facility] OR    DIALYSIS FISTULA CREATION      multiple fistulas and grafts before PD     EGD (ESOPHAGOGASTRODUODENOSCOPY) N/A 3/14/2019    Performed by Adolph Davila MD at [facility] ENDO    EGD (ESOPHAGOGASTRODUODENOSCOPY) N/A 3/13/2019    Performed by Adolph Davila MD at [facility] ENDO    INCISION AND DRAINAGE (I&D), LABIAL N/A 2016    Performed by Harmony Fernandez MD at [facility] OR    INCISION AND DRAINAGE (I&D), LABIAL (ADD ON) Left 2016    Performed by Teressa Maddox MD at [facility] OR    INCISION AND DRAINAGE OF WOUND Left     VULVAR ABCESS WITH NECROSIS    Insertion, Catheter, Central Venous, Hemodialysis N/A 3/28/2019    Performed by Kimo Weeks MD at [facility] CATH LAB    Insertion, Catheter, Central Venous, Hemodialysis N/A 3/18/2019    Performed by Kimo Weeks MD at [facility] CATH LAB    INSERTION, CATHETER, TUNNELED ABORTED Left 3/14/2019    Performed by Servando Solis MD at [facility] OR    INSERTION, GRAFT, ARTERIOVENOUS, RIGHT ARM Right  3/22/2019    Performed by BESSIE Wynn III, MD at [facility] OR 2ND FLR    ORIF, HIP Left 9/13/2018    Performed by Tevin Grullon MD at [facility] OR    PERITONEAL CATHETER INSERTION      PERMCATH REWIRE- TUNNELED CATH REWIRE Left 11/13/2017    Performed by Baldev Munroe MD at [facility] CATH LAB    PERMCATH REWIRE- TUNNELED CATH REWIRE N/A 10/5/2017    Performed by Baldev Munroe MD at [facility] CATH LAB    REMOVAL, CATHETER, DIALYSIS, PERITONEAL N/A 3/14/2019    Performed by [facility]ando Solsi MD at [facility] OR    UMBILICAL HERNIA REPAIR      Venogram, possible intervention Right 3/20/2019    Performed by BESSIE Wynn III, MD at [facility] CATH LAB       Review of patient's allergies indicates:   Allergen Reactions    Clindamycin Diarrhea    Flagyl [metronidazole hcl]     Metronidazole        No current facility-administered medications on file prior to encounter.      Current Outpatient Medications on File Prior to Encounter   Medication Sig    amLODIPine (NORVASC) 10 MG tablet Take 10 mg by mouth once daily.    aspirin (ECOTRIN) 81 MG EC tablet Take 1 tablet (81 mg total) by mouth once daily.    atorvastatin (LIPITOR) 40 MG tablet Take 40 mg by mouth every evening.     calcitRIOL (ROCALTROL) 0.25 MCG Cap Take 0.5 mcg by mouth once daily.     cinacalcet (SENSIPAR) 90 MG Tab Take 90 mg by mouth once daily.    epoetin adonay 10,000 unit/mL Soln 1 mL, epoetin adonay 20,000 unit/mL Soln 1 mL Inject 30,000 Units into the vein every 14 (fourteen) days.    gabapentin (NEURONTIN) 100 MG capsule 1 capsule 3 (three) times daily.    insulin detemir U-100 (LEVEMIR FLEXTOUCH) 100 unit/mL (3 mL) SubQ InPn pen Inject 10 Units into the skin every evening.    nateglinide (STARLIX) 120 MG tablet STARLIX 120MG 30 MINUTES BEFORE EACH MEAL.    ONETOUCH VERIO Strp USE 1 STRIP ONCE DAILY IN VITRO    pantoprazole (PROTONIX) 40 MG tablet Take 1 tablet (40 mg total) by mouth once daily.    sevelamer carbonate (RENVELA) 800 mg Tab Take 1  tablet (800 mg total) by mouth 3 (three) times daily with meals.    vitamin renal formula, B-complex-vitamin c-folic acid, (B COMPLEX-C-FOLIC ACID) 1 mg Cap Take 1 capsule by mouth once daily. 1 Capsule Oral Every day     Family History     Problem Relation (Age of Onset)    Cancer Maternal Grandmother, Paternal Grandfather    Diabetes Maternal Aunt, Paternal Aunt        Tobacco Use    Smoking status: Never Smoker    Smokeless tobacco: Never Used   Substance and Sexual Activity    Alcohol use: No     Comment: Pt reports some social use of about 1-2 drinks about every six months.    Drug use: No    Sexual activity: Not Currently     Partners: Male     Birth control/protection: None     Review of Systems   Constitution: Negative for chills, decreased appetite and diaphoresis.   HENT: Negative for congestion and ear discharge.    Eyes: Negative for blurred vision and discharge.   Cardiovascular: Negative for chest pain, dyspnea on exertion, irregular heartbeat, leg swelling and paroxysmal nocturnal dyspnea.   Respiratory: Negative for cough, hemoptysis and shortness of breath.    Gastrointestinal: Negative for abdominal pain.     Objective:     Vital Signs (Most Recent):  Temp: 97.6 °F (36.4 °C) (04/15/19 0300)  Pulse: 99 (04/15/19 1730)  Resp: (!) 26 (04/15/19 1730)  BP: 104/74 (04/15/19 1715)  SpO2: 96 % (04/15/19 1730) Vital Signs (24h Range):  Temp:  [97.6 °F (36.4 °C)-98.9 °F (37.2 °C)] 97.6 °F (36.4 °C)  Pulse:  [] 99  Resp:  [1-34] 26  SpO2:  [79 %-100 %] 96 %  BP: ()/(61-79) 104/74     Weight: 97.1 kg (214 lb)  Body mass index is 33.52 kg/m².    SpO2: 96 %  O2 Device (Oxygen Therapy): room air      Intake/Output Summary (Last 24 hours) at 4/15/2019 1748  Last data filed at 4/15/2019 1500  Gross per 24 hour   Intake 1118.42 ml   Output 0 ml   Net 1118.42 ml       Lines/Drains/Airways     Central Venous Catheter Line                 Hemodialysis Catheter 03/28/19 1528 left femoral 18 days           Drain                 Hemodialysis AV Fistula Left upper arm -- days         Hemodialysis AV Graft Right upper arm -- days          Peripheral Intravenous Line                 Peripheral IV - Single Lumen 04/12/19 0155 22 G Right Hand 3 days         Peripheral IV - Single Lumen 04/12/19 0300 20 G Left Forearm 3 days                Physical Exam   Constitutional: She is oriented to person, place, and time. She appears well-developed and well-nourished. No distress.   Eyes: Pupils are equal, round, and reactive to light. Conjunctivae are normal.   Neck: No tracheal deviation present. No thyromegaly present.   Cardiovascular: Normal rate, regular rhythm, normal heart sounds and intact distal pulses. Exam reveals no gallop and no friction rub.   No murmur heard.  IABP R CFA  Dopplerable DP bilaterally   Pulmonary/Chest: Effort normal and breath sounds normal. No respiratory distress. She has no wheezes. She has no rales.   Abdominal: Soft. Bowel sounds are normal. She exhibits no distension. There is no tenderness.   Musculoskeletal: She exhibits no edema or deformity.   Neurological: She is alert and oriented to person, place, and time. No cranial nerve deficit. Coordination normal.   Skin: Skin is warm and dry. She is not diaphoretic.   Psychiatric: She has a normal mood and affect. Her behavior is normal.       Significant Labs:   BMP:   Recent Labs   Lab 04/14/19  2248 04/15/19  0343 04/15/19  1430   * 159* 136*   * 136 136   K 4.8 4.8 4.9    100 100   CO2 23 24 25   BUN 24* 25* 26*   CREATININE 4.9* 5.2* 5.5*   CALCIUM 9.7 9.5 9.8   MG 1.6 2.0 1.9       Significant Imaging: Echocardiogram:   2D echo with color flow doppler:   Results for orders placed or performed during the hospital encounter of 11/03/16   2D echo with color flow doppler   Result Value Ref Range    QEF 60 55 - 65    Mitral Valve Regurgitation MILD     Diastolic Dysfunction No     Aortic Valve Stenosis TRIVIAL TO MILD

## 2019-04-15 NOTE — SUBJECTIVE & OBJECTIVE
Past Medical History:   Diagnosis Date    Abnormal finding on Pap smear, HPV DNA positive     Anemia associated with chronic renal failure     on Epogen    Blood type B+     Bulging discs - symptomatic      CAD (coronary artery disease)     Cardiomyopathy 3/13/2019    Diabetes mellitus, type 2     ESRD (end stage renal disease) 2004    FSGS (focal segmental glomerulosclerosis)     with collapsing    Hyperlipidemia     Hypertension     Neuropathy     NSTEMI (non-ST elevated myocardial infarction) 3/29/2019    Obesity     Secondary hyperparathyroidism, renal     TIA (transient ischemic attack)     Uterine fibroid     small uterine        Past Surgical History:   Procedure Laterality Date    CARDIAC CATHETERIZATION      PCI x 2     SECTION, CLASSIC      COLONOSCOPY N/A 2018    Performed by Rodrigue Russell MD at [facility] ENDO (2ND FLR)    DEBRIDEMENT-WOUND Left 2016    Performed by [facility]ressa Maddox MD at [facility] OR    DIALYSIS FISTULA CREATION      multiple fistulas and grafts before PD     EGD (ESOPHAGOGASTRODUODENOSCOPY) N/A 3/14/2019    Performed by Adolph Davila MD at [facility] ENDO    EGD (ESOPHAGOGASTRODUODENOSCOPY) N/A 3/13/2019    Performed by Adolph Davila MD at [facility] ENDO    INCISION AND DRAINAGE (I&D), LABIAL N/A 2016    Performed by Harmony Fernandez MD at [facility] OR    INCISION AND DRAINAGE (I&D), LABIAL (ADD ON) Left 2016    Performed by Teressa Maddox MD at [facility] OR    INCISION AND DRAINAGE OF WOUND Left     VULVAR ABCESS WITH NECROSIS    Insertion, Catheter, Central Venous, Hemodialysis N/A 3/28/2019    Performed by Kimo Weeks MD at [facility] CATH LAB    Insertion, Catheter, Central Venous, Hemodialysis N/A 3/18/2019    Performed by Kimo Weeks MD at [facility] CATH LAB    INSERTION, CATHETER, TUNNELED ABORTED Left 3/14/2019    Performed by Servando Solis MD at [facility] OR    INSERTION, GRAFT, ARTERIOVENOUS, RIGHT ARM Right  3/22/2019    Performed by BESSIE Wynn III, MD at [facility] OR 2ND FLR    ORIF, HIP Left 9/13/2018    Performed by Tevin Grullon MD at [facility] OR    PERITONEAL CATHETER INSERTION      PERMCATH REWIRE- TUNNELED CATH REWIRE Left 11/13/2017    Performed by Baldev Munroe MD at [facility] CATH LAB    PERMCATH REWIRE- TUNNELED CATH REWIRE N/A 10/5/2017    Performed by Baldev Munroe MD at [facility] CATH LAB    REMOVAL, CATHETER, DIALYSIS, PERITONEAL N/A 3/14/2019    Performed by Servando Solis MD at [facility] OR    UMBILICAL HERNIA REPAIR      Venogram, possible intervention Right 3/20/2019    Performed by BESSIE Wynn III, MD at [facility] CATH LAB       Review of patient's allergies indicates:   Allergen Reactions    Clindamycin Diarrhea    Flagyl [metronidazole hcl]     Metronidazole        PTA Medications   Medication Sig    amLODIPine (NORVASC) 10 MG tablet Take 10 mg by mouth once daily.    aspirin (ECOTRIN) 81 MG EC tablet Take 1 tablet (81 mg total) by mouth once daily.    atorvastatin (LIPITOR) 40 MG tablet Take 40 mg by mouth every evening.     calcitRIOL (ROCALTROL) 0.25 MCG Cap Take 0.5 mcg by mouth once daily.     cinacalcet (SENSIPAR) 90 MG Tab Take 90 mg by mouth once daily.    epoetin adonay 10,000 unit/mL Soln 1 mL, epoetin adonay 20,000 unit/mL Soln 1 mL Inject 30,000 Units into the vein every 14 (fourteen) days.    gabapentin (NEURONTIN) 100 MG capsule 1 capsule 3 (three) times daily.    insulin detemir U-100 (LEVEMIR FLEXTOUCH) 100 unit/mL (3 mL) SubQ InPn pen Inject 10 Units into the skin every evening.    nateglinide (STARLIX) 120 MG tablet STARLIX 120MG 30 MINUTES BEFORE EACH MEAL.    ONETOUCH VERIO Strp USE 1 STRIP ONCE DAILY IN VITRO    pantoprazole (PROTONIX) 40 MG tablet Take 1 tablet (40 mg total) by mouth once daily.    sevelamer carbonate (RENVELA) 800 mg Tab Take 1 tablet (800 mg total) by mouth 3 (three) times daily with meals.    vitamin renal formula, B-complex-vitamin c-folic acid,  (B COMPLEX-C-FOLIC ACID) 1 mg Cap Take 1 capsule by mouth once daily. 1 Capsule Oral Every day     Family History     Problem Relation (Age of Onset)    Cancer Maternal Grandmother, Paternal Grandfather    Diabetes Maternal Aunt, Paternal Aunt        Tobacco Use    Smoking status: Never Smoker    Smokeless tobacco: Never Used   Substance and Sexual Activity    Alcohol use: No     Comment: Pt reports some social use of about 1-2 drinks about every six months.    Drug use: No    Sexual activity: Not Currently     Partners: Male     Birth control/protection: None     Review of Systems   Constitution: Positive for chills and malaise/fatigue. Negative for fever and weight gain.   HENT: Negative for congestion.    Eyes: Negative for visual disturbance.   Cardiovascular: Negative for chest pain, claudication, dyspnea on exertion, leg swelling, orthopnea, palpitations and syncope.   Respiratory: Negative for cough, shortness of breath and snoring.    Hematologic/Lymphatic: Does not bruise/bleed easily.   Skin: Negative for rash.   Musculoskeletal: Negative for muscle cramps and myalgias.   Gastrointestinal: Negative for bloating, abdominal pain, constipation, diarrhea and melena.   Genitourinary: Negative for bladder incontinence.   Neurological: Negative for excessive daytime sleepiness, focal weakness and weakness.   Psychiatric/Behavioral: Negative for depression and suicidal ideas.     Objective:     Vital Signs (Most Recent):  Temp: 97.6 °F (36.4 °C) (04/15/19 0300)  Pulse: 101 (04/15/19 1530)  Resp: (!) 25 (04/15/19 1530)  BP: 108/69 (04/15/19 1530)  SpO2: 97 % (04/15/19 1530) Vital Signs (24h Range):  Temp:  [97.6 °F (36.4 °C)-98.9 °F (37.2 °C)] 97.6 °F (36.4 °C)  Pulse:  [100-112] 101  Resp:  [1-34] 25  SpO2:  [79 %-100 %] 97 %  BP: ()/(61-79) 108/69     Weight: 97.1 kg (214 lb)  Body mass index is 33.52 kg/m².    SpO2: 97 %  O2 Device (Oxygen Therapy): room air      Intake/Output Summary (Last 24 hours)  at 4/15/2019 1555  Last data filed at 4/15/2019 1500  Gross per 24 hour   Intake 1118.42 ml   Output 0 ml   Net 1118.42 ml       Lines/Drains/Airways     Central Venous Catheter Line                 Hemodialysis Catheter 03/28/19 1528 left femoral 18 days          Drain                 Hemodialysis AV Fistula Left upper arm -- days         Hemodialysis AV Graft Right upper arm -- days          Peripheral Intravenous Line                 Peripheral IV - Single Lumen 04/12/19 0155 22 G Right Hand 3 days         Peripheral IV - Single Lumen 04/12/19 0300 20 G Left Forearm 3 days                Physical Exam   Constitutional: She is oriented to person, place, and time. She appears well-developed and well-nourished. No distress.   Obese woman   HENT:   Head: Normocephalic and atraumatic.   Eyes: Pupils are equal, round, and reactive to light. Conjunctivae and EOM are normal. No scleral icterus.   Neck: Normal range of motion. No JVD present.   Cardiovascular: Normal rate, regular rhythm, normal heart sounds and intact distal pulses.   No murmur heard.  Pulses:       Radial pulses are 1+ on the right side, and 1+ on the left side.        Femoral pulses are 1+ on the right side, and 1+ on the left side.       Dorsalis pedis pulses are 0 on the right side, and 0 on the left side.        Posterior tibial pulses are 0 on the right side, and 0 on the left side.   DP doppler bilaterally, NO PT DOPPLER   Pulmonary/Chest: Effort normal and breath sounds normal.   Abdominal: Soft. Bowel sounds are normal. She exhibits no distension. There is no hepatosplenomegaly. There is no tenderness.   Musculoskeletal: Normal range of motion. She exhibits no edema.   Neurological: She is alert and oriented to person, place, and time. She has normal reflexes. No cranial nerve deficit.   Skin: Skin is warm and dry. No rash noted. She is not diaphoretic.   Cool Feet   Psychiatric: She has a normal mood and affect. Judgment and thought content  normal.       Significant Labs:   CMP   Recent Labs   Lab 04/14/19  2248 04/15/19  0343 04/15/19  1430   * 136 136   K 4.8 4.8 4.9    100 100   CO2 23 24 25   * 159* 136*   BUN 24* 25* 26*   CREATININE 4.9* 5.2* 5.5*   CALCIUM 9.7 9.5 9.8   PROT  --  6.8  --    ALBUMIN 1.6* 1.7*  1.7* 1.6*   BILITOT  --  0.3  --    ALKPHOS  --  112  --    AST  --  143*  --    ALT  --  348*  --    ANIONGAP 12 12 11   ESTGFRAFRICA 11.2* 10.4* 9.7*   EGFRNONAA 9.7* 9.0* 8.4*   , CBC   Recent Labs   Lab 04/14/19  0040 04/15/19  0343   WBC 15.64* 12.39   HGB 7.5* 7.1*   HCT 23.3* 21.9*    263   , Lipid Panel No results for input(s): CHOL, HDL, LDLCALC, TRIG, CHOLHDL in the last 48 hours. and Troponin No results for input(s): TROPONINI in the last 48 hours.    Significant Imaging:   ECHO:   · Severely decreased left ventricular systolic function. The estimated ejection fraction is 25% (down from 40%)  · Left ventricular diastolic dysfunction.  · Local segmental wall motion abnormalities.  · Low normal right ventricular systolic function.  · Severe left atrial enlargement.  · Mild-to-moderate aortic valve stenosis.  · Aortic valve area is 1.48 cm2; peak velocity is 1.91 m/s; mean gradient is 10.27 mmHg.  · Moderate mitral sclerosis.  · Moderate to moderate-severe (2-3+) mitral regurgitation.  · Mild to moderate tricuspid regurgitation.  · Mild pulmonic regurgitation.  · Normal central venous pressure (3 mm Hg).  · The estimated PA systolic pressure is 32 mm Hg    EKG:  Sinus tachy, first degree block, T-wave inversions in I, AvL V5-6. Stable on serial EKGs since end of March

## 2019-04-15 NOTE — BRIEF OP NOTE
".    Post Cath Note  Referring Physician: Jewel Montenegro MD  Procedure: Left heart cath (Left), INSERTION, INTRA-AORTIC BALLOON PUMP, ANGIOGRAM, CORONARY ARTERY (N/A)       Access: Right CFA    LVEDP 35 mmHg    30-vessel coronary disease  Elevated filling pressure and hypotension suggestive of cardiogenic shock    See full report for further details    Intervention:     IABP placed    Closure device: None, sheath in place    Post Cath Exam:   /69   Pulse 101   Temp 97.6 °F (36.4 °C) (Oral)   Resp (!) 25   Ht 5' 7" (1.702 m)   Wt 97.1 kg (214 lb)   LMP 01/28/2014 (Approximate)   SpO2 97%   Breastfeeding? No   BMI 33.52 kg/m²   No unusual pain, hematoma, thrill or bruit at vascular access site.  Distal pulse present without signs of ischemia.    Recommendations:   - Routine post-cath care  - Continue medical management, Risk factor reduction, CTS consult  - Cardiogenic shock in the setting of ICM with 3V disease requiring IABP placement  - please consult CT surgery for evaluation for CABG    Signed:  Héctor Cardenas MD  Cardiology Fellow  Pager: [phone]        "

## 2019-04-15 NOTE — ASSESSMENT & PLAN NOTE
-c/w ACS protocol (DAPT- ASA/Plavix and heparin drip)   -LV EF of 25%, will initiate GDMT when stable.  -NSTEMI with troponin up to 16, [facility] today with 3 vessel disease 99% LAD, 80%LCX, 100% RCA, LVEDP in 30s, hypotensive, IABP placed. CT surgery consulted for CABG Eval.

## 2019-04-15 NOTE — ASSESSMENT & PLAN NOTE
ESRD on iHD TTS  [facility]  L femoral tunneled catheter (3/28)    47 yo female well known to our service who has been dialysis dependent for >15 years, who presents from SNF for hypotension and fever after hemodialysis on Thursday.  Nephrology has been consulted for ESRD management while in-patient.    Off pressor support this morning, oxygenating well on RA.    Plan/recommendations:  -plan for 12 hour SLED post [facility] today  -UF goal 300-350 cc/hr as tolerated

## 2019-04-15 NOTE — ASSESSMENT & PLAN NOTE
-Patient admitted for hypotension with lactic acid > 12  -Source likely bacteremia / peritonitis /pneumonia /line . However she has no other symptoms of cough or SOB   - CXR with increased opacity in the Left lobe   - Was given cefepime, vancomycin , and zosyn in the ED.  She also received a total of 1.5L     Plan   - Continue broad spectrum antibiotics with vancomycin and zosyn  (start date 4/12/2019)  - LA and WBC has trended down  - Will maitain femoral line as this dose not appear to be the source of infection  - BCx 4/12 NGTD  - off pressors

## 2019-04-15 NOTE — SUBJECTIVE & OBJECTIVE
Interval History:   Patient evaluated at bedside, in which yesterday was started on SLED for clearance and slight volume removal. She clotted after 7 hrs. NET positive 2 L yesterday. Currently no oxygen requirement and denies any SOB.     Review of patient's allergies indicates:   Allergen Reactions    Clindamycin Diarrhea    Flagyl [metronidazole hcl]     Metronidazole      Current Facility-Administered Medications   Medication Frequency    0.9%  NaCl infusion (for blood administration) Q24H PRN    albuterol-ipratropium 2.5 mg-0.5 mg/3 mL nebulizer solution 3 mL Q4H PRN    aspirin EC tablet 81 mg Daily    atorvastatin tablet 40 mg QHS    benzonatate capsule 100 mg TID PRN    calcitRIOL capsule 0.5 mcg Daily    clopidogrel tablet 75 mg Daily    dextrose 50% injection 12.5 g PRN    dextrose 50% injection 25 g PRN    gabapentin capsule 100 mg Once    glucagon (human recombinant) injection 1 mg PRN    glucose chewable tablet 16 g PRN    glucose chewable tablet 24 g PRN    heparin 25,000 units in dextrose 5% (100 units/ml) IV bolus from bag - ADDITIONAL PRN BOLUS - 30 units/kg (max bolus 4000 units) PRN    heparin 25,000 units in dextrose 5% (100 units/ml) IV bolus from bag - ADDITIONAL PRN BOLUS - 60 units/kg (max bolus 4000 units) PRN    heparin 25,000 units in dextrose 5% 250 mL (100 units/mL) infusion LOW INTENSITY nomogram - [facility] Continuous    insulin aspart U-100 pen 0-5 Units QID (AC + HS) PRN    loperamide capsule 2 mg TID PRN    norepinephrine 4 mg in dextrose 5% 250 mL infusion (premix) (titrating) Continuous    pantoprazole EC tablet 40 mg Daily    piperacillin-tazobactam 4.5 g in sodium chloride 0.9% 100 mL IVPB (ready to mix system) Q12H    sevelamer carbonate tablet 800 mg TID WM       Objective:     Vital Signs (Most Recent):  Temp: 99.3 °F (37.4 °C) (04/13/19 2300)  Pulse: 106 (04/14/19 2042)  Resp: (!) 25 (04/14/19 2042)  BP: 99/70 (04/14/19 1815)  SpO2: 98 % (04/14/19 2042)  O2  Device (Oxygen Therapy): room air (04/14/19 2042) Vital Signs (24h Range):  Temp:  [99.3 °F (37.4 °C)] 99.3 °F (37.4 °C)  Pulse:  [106-126] 106  Resp:  [21-32] 25  SpO2:  [79 %-98 %] 98 %  BP: ()/(52-83) 99/70     Weight: 97.1 kg (214 lb) (04/14/19 1815)  Body mass index is 33.52 kg/m².  Body surface area is 2.14 meters squared.    I/O last 3 completed shifts:  In: 3610.5 [P.O.:420; I.V.:2718.4; IV Piggyback:472.1]  Out: 1251 [Other:1251]    Physical Exam   Constitutional: She is oriented to person, place, and time. She appears well-developed and well-nourished. No distress.   HENT:   Head: Normocephalic and atraumatic.   Right Ear: External ear normal.   Left Ear: External ear normal.   Eyes: Conjunctivae and EOM are normal. Right eye exhibits no discharge. Left eye exhibits no discharge.   Neck: Normal range of motion. Neck supple.   Cardiovascular: Normal rate and regular rhythm. Exam reveals no gallop and no friction rub.   No murmur heard.  Pulmonary/Chest: Effort normal and breath sounds normal. No respiratory distress. She has no wheezes. She has no rales.   Abdominal: Soft. Bowel sounds are normal. She exhibits no distension. There is no tenderness.   Musculoskeletal: Normal range of motion. She exhibits no edema or deformity.   Neurological: She is alert and oriented to person, place, and time.   Skin: Skin is warm and dry. She is not diaphoretic.   L femoral tunneled dialysis catheter   Psychiatric: She has a normal mood and affect. Her behavior is normal.       Significant Labs:  ABGs:   Recent Labs   Lab 04/12/19  0752   PH 7.301*   PCO2 47.1*   HCO3 23.2*   POCSATURATED 31*   BE -3     BMP:   Recent Labs   Lab 04/13/19 2039   *      CO2 26   BUN 18   CREATININE 3.5*   CALCIUM 9.1   MG 1.6     CBC:   Recent Labs   Lab 04/14/19  0040   WBC 15.64*   RBC 2.45*   HGB 7.5*   HCT 23.3*      MCV 95   MCH 30.6   MCHC 32.2     CMP:   Recent Labs   Lab 04/13/19  0642  04/13/19 2039    *   < > 145*   CALCIUM 7.4*   < > 9.1   ALBUMIN 1.4*  1.4*   < > 1.7*   PROT 5.4*  5.4*  --   --       < > 138   K 3.9   < > 4.4   CO2 17*   < > 26      < > 102   BUN 25*   < > 18   CREATININE 4.4*   < > 3.5*   ALKPHOS 96  96  --   --    *  301*  --   --    *  354*  --   --    BILITOT 0.1  0.1  --   --     < > = values in this interval not displayed.     All labs within the past 24 hours have been reviewed.

## 2019-04-15 NOTE — SIGNIFICANT EVENT
Care Plan Update/Transfer of Care    Called by Interventional Cardiology team in the catheterization lab.  Per discussion, the patient was found to have significant 3 vessel CAD with an EF-25%-->subsequent intraaortic balloon pump placed.  I discussed the case with the CICU fellow.  The patient's care will now be transferred over to the CICU service.    Mike Benton MD, MSc  Pulmonary/Critical Care Fellow

## 2019-04-15 NOTE — ASSESSMENT & PLAN NOTE
- continue with plavix , heparin ggt, ASA, Lipitor  - interventional cardiology evaluated patient, planning on out patient ischemic evaluation  - echo :Severely decreased left ventricular systolic function. The estimated ejection fraction is 25% Left ventricular diastolic dysfunction.Local segmental wall motion abnormalities.Low normal right ventricular systolic function.

## 2019-04-15 NOTE — H&P
"[facility]  Critical Care Medicine  History & Physical    Patient Name: Jenni Todd  MRN: [id number]  Admission Date: 4/12/2019  Hospital Length of Stay: 3 days  Code Status: Full Code  Attending Physician: Jewel Montenegro MD   Primary Care Provider: Michael Tan Iii, MD   Principal Problem: Septic shock    Subjective:     HPI:  Ms. Jenni Todd is a 48 y.o. female w/ CAD s/p 2 PCI (2012), HFrEF 30%, HTN, DM, and ESRD who presented from [facility] for hypotension and fever. Admitted to the MICU for septic Shock. Patient earlier today was getting dialysis and her blood pressure was low. She was also noted to have a fever of 101 and was sent to the ED. Per patient she nanci good with no complaints. She denies any cough, nausea, vomiting or abdominal pain , she complained of feeling dizzy. This was her first episode of fever. Patient stated that she was recently discharged a week ago to [facility] after developing  peritonitis. She used to do PD but switched to regular HD after peritonitis. PD cath after becoming septic from peritonitis. Attempts to place tunneled IJ catheters and grafts failed. She has a left femoral catheter placed for HD . Last HD today, took off 1.5 L. Patient reports BP had been low all day since HD, approximately 80 systolic. Her only complaints is "a funny feeling in my center chest" that is not tightness, pressure, or pain.     In the ED patient found hypotensive with lactate > 12.     Per last discharge summary:    used to be on home PD admitted to [facility] on 2/21 for peritonitis. PD catheter removed. Due to hx of recurrent peritonitis she was transitioned to HD. Bilateral IJ tunneled catheters were attempted without success. Now has a temporary dialysis catheter in her left fem. Transferred to [facility] for vascular. Nephrology access team attempted a tunneled line and were unsuccessful. Vascular surgery consulted for HD access. Hospital course complicated by GIB with low " risk ulcer. S/p OR 3/22 per vascular for RUE graft (Arsen). 3/28 groin perm-cath placed. NSTEMI 3/29.       Hospital/ICU Course:  Admitted to MICU  for septic shock of unclear etiology, possibly 2/2 peritonitis vs femoral tunneled cath. Patient also found to have NSTEMI, likely type 2 and started on heparin, ASA, Plavix, lipitor. Evaluated by interventional cardiology and recommend continue tx for NSTEMI and out pt ischemic eval when more stable. Pt started on broad spectrum abx on admission with vanc and zosyn. BCx remain negative. Femoral line does not appear infection on exam, unlikely source of infection, will maintain line.Pt to get a LHC and SLED      Past Medical History:   Diagnosis Date    Abnormal finding on Pap smear, HPV DNA positive     Anemia associated with chronic renal failure     on Epogen    Blood type B+     Bulging discs - symptomatic      CAD (coronary artery disease)     Cardiomyopathy 3/13/2019    Diabetes mellitus, type 2     ESRD (end stage renal disease) 2004    FSGS (focal segmental glomerulosclerosis)     with collapsing    Hyperlipidemia     Hypertension     Neuropathy     NSTEMI (non-ST elevated myocardial infarction) 3/29/2019    Obesity     Secondary hyperparathyroidism, renal     TIA (transient ischemic attack)     Uterine fibroid     small uterine        Past Surgical History:   Procedure Laterality Date    CARDIAC CATHETERIZATION      PCI x 2     SECTION, CLASSIC      COLONOSCOPY N/A 2018    Performed by Rodrigue Russell MD at [facility] ENDO (2ND FLR)    DEBRIDEMENT-WOUND Left 2016    [facility] at [facility] OR    DIALYSIS FISTULA CREATION      multiple fistulas and grafts before PD     EGD (ESOPHAGOGASTRODUODENOSCOPY) N/A 3/14/2019    Performed by Adolph Davila MD at [facility] ENDO    EGD (ESOPHAGOGASTRODUODENOSCOPY) N/A 3/13/2019    Performed by Adolph [facility]ila MD at [facility] ENDO    INCISION AND DRAINAGE  (I&D), LABIAL N/A 4/17/2016    Performed by Harmony Fernandez MD at [facility] OR    INCISION AND DRAINAGE (I&D), LABIAL (ADD ON) Left 4/18/2016    Performed by Teressa Maddox MD at [facility] OR    INCISION AND DRAINAGE OF WOUND Left 2016    VULVAR ABCESS WITH NECROSIS    Insertion, Catheter, Central Venous, Hemodialysis N/A 3/28/2019    Performed by Kimo Weeks MD at [facility] CATH LAB    Insertion, Catheter, Central Venous, Hemodialysis N/A 3/18/2019    Performed by Kimo Weeks MD at [facility] CATH LAB    INSERTION, CATHETER, TUNNELED ABORTED Left 3/14/2019    Performed by Servando Solis MD at [facility] OR    INSERTION, GRAFT, ARTERIOVENOUS, RIGHT ARM Right 3/22/2019    Performed by BESSIE Wynn III, MD at [facility] OR 2ND FLR    ORIF, HIP Left 9/13/2018    Performed by Tevin Grullon MD at [facility] OR    PERITONEAL CATHETER INSERTION      PERMCATH REWIRE- TUNNELED CATH REWIRE Left 11/13/2017    Performed by Baldev Munroe MD at [facility] CATH LAB    PERMCATH REWIRE- TUNNELED CATH REWIRE N/A 10/5/2017    Performed by Baldev Munroe MD at [facility] CATH LAB    REMOVAL, CATHETER, DIALYSIS, PERITONEAL N/A 3/14/2019    Performed by Servando Solis MD at [facility] OR    UMBILICAL HERNIA REPAIR      Venogram, possible intervention Right 3/20/2019    Performed by BESSIE Wynn III, MD at [facility] CATH LAB       Review of patient's allergies indicates:   Allergen Reactions    Clindamycin Diarrhea    Flagyl [metronidazole hcl]     Metronidazole        Family History     Problem Relation (Age of Onset)    Cancer Maternal Grandmother, Paternal Grandfather    Diabetes Maternal Aunt, Paternal Aunt        Tobacco Use    Smoking status: Never Smoker    Smokeless tobacco: Never Used   Substance and Sexual Activity    Alcohol use: No     Comment: Pt reports some social use of about 1-2 drinks about every six months.    Drug use: No    Sexual activity: Not Currently     Partners: Male     Birth control/protection: None       Review of Systems   Constitutional: Negative for chills, fatigue and fever.   HENT: Negative for congestion.    Respiratory: Negative for chest tightness and shortness of breath.    Cardiovascular: Negative for chest pain, palpitations and leg swelling.   Gastrointestinal: Negative for abdominal pain.   Genitourinary: Negative for pelvic pain.   Musculoskeletal: Negative for back pain.   Skin: Negative for color change, pallor, rash and wound.   Neurological: Negative for dizziness and light-headedness.   Psychiatric/Behavioral: Negative for agitation.     Objective:     Vital Signs (Most Recent):  Temp: 97.6 °F (36.4 °C) (04/15/19 0300)  Pulse: 103 (04/15/19 1300)  Resp: (!) 24 (04/15/19 1300)  BP: 100/63 (04/15/19 1300)  SpO2: 98 % (04/15/19 1300) Vital Signs (24h Range):  Temp:  [97.6 °F (36.4 °C)-98.9 °F (37.2 °C)] 97.6 °F (36.4 °C)  Pulse:  [102-112] 103  Resp:  [13-34] 24  SpO2:  [79 %-100 %] 98 %  BP: ()/(61-79) 100/63   Weight: 97.1 kg (214 lb)  Body mass index is 33.52 kg/m².      Intake/Output Summary (Last 24 hours) at 4/15/2019 1320  Last data filed at 4/15/2019 1300  Gross per 24 hour   Intake 1001.82 ml   Output 0 ml   Net 1001.82 ml       Physical Exam   Constitutional: She is oriented to person, place, and time. She appears well-developed and well-nourished. No distress.   HENT:   Head: Normocephalic and atraumatic.   Right Ear: External ear normal.   Left Ear: External ear normal.   Eyes: Conjunctivae and EOM are normal. Right eye exhibits no discharge. Left eye exhibits no discharge.   Neck: Normal range of motion. Neck supple.   Cardiovascular: Normal rate and regular rhythm. Exam reveals no gallop and no friction rub.   No murmur heard.  Pulmonary/Chest: Effort normal and breath sounds normal. No respiratory distress. She has no wheezes. She has no rales.   Abdominal: Soft. Bowel sounds are normal. She exhibits no distension. There is no tenderness.   Musculoskeletal: Normal range of  motion. She exhibits no edema or deformity.   Neurological: She is alert and oriented to person, place, and time.   Skin: Skin is warm and dry. She is not diaphoretic.   L femoral tunneled dialysis catheter   Psychiatric: She has a normal mood and affect. Her behavior is normal.       Vents:     Lines/Drains/Airways     Central Venous Catheter Line                 Hemodialysis Catheter 03/28/19 1528 left femoral 17 days          Drain                 Hemodialysis AV Fistula Left upper arm -- days         Hemodialysis AV Graft Right upper arm -- days          Peripheral Intravenous Line                 Peripheral IV - Single Lumen 04/12/19 0155 22 G Right Hand 3 days         Peripheral IV - Single Lumen 04/12/19 0300 20 G Left Forearm 3 days              Significant Labs:    CBC/Anemia Profile:  Recent Labs   Lab 04/14/19  0040 04/15/19  0343   WBC 15.64* 12.39   HGB 7.5* 7.1*   HCT 23.3* 21.9*    263   MCV 95 93   RDW 17.1* 17.3*        Chemistries:  Recent Labs   Lab 04/13/19  2039 04/14/19  2248 04/15/19  0343    135* 136   K 4.4 4.8 4.8    100 100   CO2 26 23 24   BUN 18 24* 25*   CREATININE 3.5* 4.9* 5.2*   CALCIUM 9.1 9.7 9.5   ALBUMIN 1.7* 1.6* 1.7*  1.7*   PROT  --   --  6.8   BILITOT  --   --  0.3   ALKPHOS  --   --  112   ALT  --   --  348*   AST  --   --  143*   MG 1.6 1.6 2.0   PHOS 3.6 5.4* 5.6*       All pertinent labs within the past 24 hours have been reviewed.    Significant Imaging: I have reviewed all pertinent imaging results/findings within the past 24 hours.  I have reviewed and interpreted all pertinent imaging results/findings within the past 24 hours.    Assessment/Plan:     Cardiac/Vascular  NSTEMI (non-ST elevated myocardial infarction)   ACS protocol started  (DAPT- ASA/Plavix and heparin drip)   -LV EF of 40%, will need to initiate GDMT (beta-blocker, ACE-I or ARB) but currently her BP is on the low side and is likely  going to need SLED   Interventional Cardiology  consult, could be taking patient to cath today .      CAD (coronary artery disease)  - continue with plavix , heparin ggt, ASA, Lipitor  - interventional cardiology evaluated patient, planning on out patient ischemic evaluation  - echo :Severely decreased left ventricular systolic function. The estimated ejection fraction is 25% Left ventricular diastolic dysfunction.Local segmental wall motion abnormalities.Low normal right ventricular systolic function.      Renal/  ESRD (end stage renal disease) on dialysis  -at home gets dialysis TTS  -Nephrology consulted, appreciate recomendations   ----femoral tunneled cath dose not appear infected, will maintain line due to limited vascular options  ----if pt deteriorates or fails to improve with Abx, then will consider changing out tunneled catheter  ----planning on SLED today, after Cath     Anemia in chronic kidney disease, on chronic dialysis  - continue Epogen per nephrology  - Hb stable   - received 1 u PRBCs overnight    ID  * Septic shock  -Patient admitted for hypotension with lactic acid > 12  -Source likely bacteremia / peritonitis /pneumonia /line . However she has no other symptoms of cough or SOB   - CXR with increased opacity in the Left lobe   - Was given cefepime, vancomycin , and zosyn in the ED.  She also received a total of 1.5L     Plan   - Continue broad spectrum antibiotics with vancomycin and zosyn  (start date 4/12/2019)  - LA and WBC has trended down  - Will maitain femoral line as this dose not appear to be the source of infection  - BCx 4/12 NGTD  - off pressors                  Critical secondary to Patient has a condition that poses threat to life and bodily function: Acute Renal Failure    Patient seen and examined this morning. Discussed with Dr. Montenegro  - staff attestation to follow.         Critical care was time spent personally by me on the following activities: development of treatment plan with patient or surrogate and bedside  caregivers, discussions with consultants, evaluation of patient's response to treatment, examination of patient, ordering and performing treatments and interventions, ordering and review of laboratory studies, ordering and review of radiographic studies, pulse oximetry, re-evaluation of patient's condition. This critical care time did not overlap with that of any other provider or involve time for any procedures.     Desiree Swift MD  Critical Care Medicine  [facility]

## 2019-04-16 PROBLEM — I50.23 ACUTE ON CHRONIC SYSTOLIC HEART FAILURE: Status: ACTIVE | Noted: 2019-01-01

## 2019-04-16 NOTE — PLAN OF CARE
Problem: Adult Inpatient Plan of Care  Goal: Plan of Care Review  Outcome: Ongoing (interventions implemented as appropriate)  No acute events throughout day, VS and assessment per flow sheet. SCUF tonight. R groin IABP still in place, 1:1. Heparin drip continued. Plan to wean balloon pump tomorrow and possible viability PET to see if candidate for PIC. Plan of care reviewed with Jenni Todd and family, all concerns addressed, will continue to monitor.

## 2019-04-16 NOTE — ASSESSMENT & PLAN NOTE
-c/w ACS protocol (DAPT- ASA/Plavix and heparin drip)   -NSTEMI with troponin up to 16, [facility] today with 3 vessel disease 99% LAD, 80%LCX, 100% RCA, LVEDP in 30s, hypotensive, IABP placed. CT surgery consulted for CABG Eval.

## 2019-04-16 NOTE — SUBJECTIVE & OBJECTIVE
No current facility-administered medications on file prior to encounter.      Current Outpatient Medications on File Prior to Encounter   Medication Sig    amLODIPine (NORVASC) 10 MG tablet Take 10 mg by mouth once daily.    aspirin (ECOTRIN) 81 MG EC tablet Take 1 tablet (81 mg total) by mouth once daily.    atorvastatin (LIPITOR) 40 MG tablet Take 40 mg by mouth every evening.     calcitRIOL (ROCALTROL) 0.25 MCG Cap Take 0.5 mcg by mouth once daily.     cinacalcet (SENSIPAR) 90 MG Tab Take 90 mg by mouth once daily.    epoetin adonay 10,000 unit/mL Soln 1 mL, epoetin adonay 20,000 unit/mL Soln 1 mL Inject 30,000 Units into the vein every 14 (fourteen) days.    gabapentin (NEURONTIN) 100 MG capsule 1 capsule 3 (three) times daily.    insulin detemir U-100 (LEVEMIR FLEXTOUCH) 100 unit/mL (3 mL) SubQ InPn pen Inject 10 Units into the skin every evening.    nateglinide (STARLIX) 120 MG tablet STARLIX 120MG 30 MINUTES BEFORE EACH MEAL.    ONETOUCH VERIO Strp USE 1 STRIP ONCE DAILY IN VITRO    pantoprazole (PROTONIX) 40 MG tablet Take 1 tablet (40 mg total) by mouth once daily.    sevelamer carbonate (RENVELA) 800 mg Tab Take 1 tablet (800 mg total) by mouth 3 (three) times daily with meals.    vitamin renal formula, B-complex-vitamin c-folic acid, (B COMPLEX-C-FOLIC ACID) 1 mg Cap Take 1 capsule by mouth once daily. 1 Capsule Oral Every day       Review of patient's allergies indicates:   Allergen Reactions    Clindamycin Diarrhea    Flagyl [metronidazole hcl]     Metronidazole        Past Medical History:   Diagnosis Date    Abnormal finding on Pap smear, HPV DNA positive 2014    Anemia associated with chronic renal failure     on Epogen    Blood type B+     Bulging discs - symptomatic      CAD (coronary artery disease)     Cardiomyopathy 3/13/2019    Diabetes mellitus, type 2     ESRD (end stage renal disease) 04/20/2004    FSGS (focal segmental glomerulosclerosis)     with collapsing     Hyperlipidemia     Hypertension 2004    Neuropathy     NSTEMI (non-ST elevated myocardial infarction) 3/29/2019    Obesity     Secondary hyperparathyroidism, renal     TIA (transient ischemic attack)     Uterine fibroid     small uterine      Past Surgical History:   Procedure Laterality Date    CARDIAC CATHETERIZATION      PCI x 2     SECTION, CLASSIC      COLONOSCOPY N/A 2018    Performed by Rodrigue Russell MD at [facility] ENDO (2ND FLR)    DEBRIDEMENT-WOUND Left 2016    Performed by [facility]a Maddox [facility] at [facility] OR    DIALYSIS FISTULA CREATION      multiple fistulas and grafts before PD     EGD (ESOPHAGOGASTRODUODENOSCOPY) N/A 3/14/2019    Performed by Adolph Davila MD at [facility] ENDO    EGD (ESOPHAGOGASTRODUODENOSCOPY) N/A 3/13/2019    Performed by Adolph [facility]ila MD at [facility] ENDO    INCISION AND DRAINAGE (I&D), LABIAL N/A 2016    Performed by Harmony Fernandez MD at [facility] OR    INCISION AND DRAINAGE (I&D), LABIAL (ADD ON) Left 2016    Performed by [facility]ressa Maddox MD at [facility] OR    INCISION AND DRAINAGE OF WOUND Left     VULVAR ABCESS WITH NECROSIS    Insertion, Catheter, Central Venous, Hemodialysis N/A 3/28/2019    Performed by Kimo Weeks MD at [facility] CATH LAB    Insertion, Catheter, Central Venous, Hemodialysis N/A 3/18/2019    Performed by Kimo Weeks MD at [facility] CATH LAB    INSERTION, CATHETER, TUNNELED ABORTED Left 3/14/2019    Performed by Servando Solis MD at [facility] OR    INSERTION, GRAFT, ARTERIOVENOUS, RIGHT ARM Right 3/22/2019    Performed by BESSIE Wynn III, MD at [facility] OR 2ND FLR    ORIF, HIP Left 2018    Performed by Tevin Grullon MD at [facility] OR    PERITONEAL CATHETER INSERTION      PERMCATH REWIRE- TUNNELED CATH REWIRE Left 2017    Performed by Baldev Munroe MD at [facility] CATH LAB    PERMCATH REWIRE- TUNNELED CATH REWIRE N/A 10/5/2017    Performed by Baldev Munroe MD at [facility] CATH LAB    REMOVAL, CATHETER,  DIALYSIS, PERITONEAL N/A 3/14/2019    Per[facility] by [facility] at [facility]    UMBILICAL HERNIA REPAIR      Venogram, possible intervention Right 3/20/2019    Performed by BESSIE Wynn III, [facility] at [facility] CATH LAB     Family History     Problem Relation (Age of Onset)    Cancer Maternal Grandmother, Paternal Grandfather    Diabetes Maternal Aunt, Paternal Aunt        Tobacco Use    Smoking status: Never Smoker    Smokeless tobacco: Never Used   Substance and Sexual Activity    Alcohol use: No     Comment: Pt reports some social use of about 1-2 drinks about every six months.    Drug use: No    Sexual activity: Not Currently     Partners: Male     Birth control/protection: None     Review of Systems   Constitutional: Negative for activity change and fatigue.   Respiratory: Negative for cough and shortness of breath.    Cardiovascular: Negative for chest pain, palpitations and leg swelling.   Gastrointestinal: Negative for abdominal pain, nausea and vomiting.   Endocrine: Negative for polydipsia, polyphagia and polyuria.   Genitourinary: Negative for dysuria.   Musculoskeletal: Negative for gait problem.   Skin: Negative for rash.   Allergic/Immunologic: Negative for immunocompromised state.   Neurological: Negative for dizziness, syncope and weakness.   Hematological: Does not bruise/bleed easily.   Psychiatric/Behavioral: Negative for behavioral problems.     Objective:     Vital Signs (Most Recent):  Temp: 98.8 °F (37.1 °C) (04/16/19 1100)  Pulse: 100 (04/16/19 1100)  Resp: 19 (04/16/19 1100)  BP: (!) 156/73 (04/16/19 0701)  SpO2: 97 % (04/16/19 1100) Vital Signs (24h Range):  Temp:  [97.4 °F (36.3 °C)-98.8 °F (37.1 °C)] 98.8 °F (37.1 °C)  Pulse:  [] 100  Resp:  [1-35] 19  SpO2:  [93 %-100 %] 97 %  BP: ()/(54-74) 156/73     Weight: 97.1 kg (214 lb)  Body mass index is 33.52 kg/m².    SpO2: 97 %  O2 Device (Oxygen Therapy): room air     Intake/Output - Last 3 Shifts       04/14 0700 -  04/15 0659 04/15 0700 - 04/16 0659 04/16 0700 - 04/17 0659    P.O. 180 390     I.V. (mL/kg) 474.7 (4.9) 2285.3 (23.5) 474 (4.9)    Blood  753.3     IV Piggyback 100 222.1 100    Total Intake(mL/kg) 754.7 (7.8) 3650.7 (37.6) 574 (5.9)    Urine (mL/kg/hr) 0 (0)      Other  3050 868    Stool  0 0    Total Output 0 3050 868    Net +754.7 +600.7 -294           Stool Occurrence  2 x 2 x           Lines/Drains/Airways     Central Venous Catheter Line                 Hemodialysis Catheter 03/28/19 1528 left femoral 18 days          Drain                 Hemodialysis AV Fistula Left upper arm -- days         Hemodialysis AV Graft Right upper arm -- days          Peripheral Intravenous Line                 Peripheral IV - Single Lumen 04/12/19 0155 22 G Left Hand 4 days         Peripheral IV - Single Lumen 04/12/19 0300 20 G Left Forearm 4 days                 STS Risk Score: 5.8%    Physical Exam   Constitutional: She is oriented to person, place, and time. She appears well-developed and well-nourished.   Cardiovascular: Normal rate, regular rhythm and normal heart sounds.   Pulmonary/Chest: Effort normal and breath sounds normal.   Abdominal: Soft.   Neurological: She is alert and oriented to person, place, and time.   Skin: Skin is warm, dry and intact.   Psychiatric: She has a normal mood and affect.       Significant Labs:  BMP:   Recent Labs   Lab 04/16/19  0529   *      K 4.6      CO2 25   BUN 9   CREATININE 2.3*   CALCIUM 9.1   MG 1.8     CBC:   Recent Labs   Lab 04/16/19  0529   WBC 8.73   RBC 2.66*   HGB 7.8*   HCT 25.2*      MCV 95   MCH 29.3   MCHC 31.0*       Significant Diagnostics:  LHC:   Left Main   There is moderate diffuse disease throughout the vessel.   Left Anterior Descending   Prox LAD lesion is 99% stenosed.   Left Circumflex   Prox Cx lesion is 80% stenosed.   Right Coronary Artery   Mid RCA to Dist RCA lesion is 100% stenosed.     ECHO:  · Severely decreased left  ventricular systolic function. The estimated ejection fraction is 25%  · Left ventricular diastolic dysfunction.  · Local segmental wall motion abnormalities.  · Low normal right ventricular systolic function.  · Severe left atrial enlargement.  · Mild-to-moderate aortic valve stenosis.  · Aortic valve area is 1.48 cm2; peak velocity is 1.91 m/s; mean gradient is 10.27 mmHg.  · Moderate mitral sclerosis.  · Moderate to moderate-severe (2-3+) mitral regurgitation.  · Mild to moderate tricuspid regurgitation.  · Mild pulmonic regurgitation.  · Normal central venous pressure (3 mm Hg).  · The estimated PA systolic pressure is 32 mm Hg

## 2019-04-16 NOTE — PROGRESS NOTES
04/16/2019  Kenyon Clancy    Current provider:  Dionne Bullock MD      I, Kenyon Clancy, rounded on Jenni Todd to ensure all mechanical assist device settings (IABP or VAD) were appropriate and all parameters were within limits.  I was able to ensure all back up equipment was present, the staff had no issues, and the Perfusion Department daily rounding was complete.    11:10 AM

## 2019-04-16 NOTE — ASSESSMENT & PLAN NOTE
ESRD on iHD TTS  [facility]  L femoral tunneled catheter (3/28)    47 yo female well known to our service who has been dialysis dependent for >15 years, who presents from SNF for hypotension and fever after hemodialysis on Thursday.  Nephrology has been consulted for ESRD management while in-patient.    HDS off pressor support.  IABP in place.    Plan/recommendations:  -plan for SCUF for volume management  -UF goal 350-400 cc/hr, goal to be net negative ~ 1-2L/24h.  -strict I/O's  -discontinue Renvlea while on SLED  -will closely monitor.

## 2019-04-16 NOTE — HPI
"Ms. Jenni Todd is a 48 y.o. female w/ CAD s/p 2 PCI (2012), HFrEF 30%, HTN, DM, and ESRD who presented from [facility] for hypotension and fever. Admitted to the MICU for septic Shock. Patient earlier today was getting dialysis and her blood pressure was low. She was also noted to have a fever of 101 and was sent to the ED. Per patient she nanci good with no complaints. She denies any cough, nausea, vomiting or abdominal pain , she complained of feeling dizzy. This was her first episode of fever. Patient stated that she was recently discharged a week ago to [facility] after developing  peritonitis. She used to do PD but switched to regular HD after peritonitis. PD cath after becoming septic from peritonitis. Attempts to place tunneled IJ catheters and grafts failed. She has a left femoral catheter placed for HD . Last HD today, took off 1.5 L. Patient reports BP had been low all day since HD, approximately 80 systolic. Her only complaints is "a funny feeling in my center chest" that is not tightness, pressure, or pain.    In the ED patient found hypotensive with lactate > 12.           "

## 2019-04-16 NOTE — PLAN OF CARE
Problem: Adult Inpatient Plan of Care  Goal: Plan of Care Review  Outcome: Ongoing (interventions implemented as appropriate)    No acute events throughout day. See vital signs and assessments in flowsheets. See below for updates on today's progress.     Pulmonary: Room air, O2 sats 96-98%.     Cardiovascular: Balloon pump on right groin, 1:1, ECG. SR/ST, HR 98s-105s. BP 80s-100s/50s-60s. Augmenting 90s-120s. Distal pulses doppler     Neurological: AAO x 4, follows commands, aefebrile.     Gastrointestinal: 1 BM today, diabetic/renal diet, 1500 cc FR.    Genitourinary: CRRT, UF @ 350.    Endocrine: Accuchecks ACHS.    Skin/Bath: Barrier cream applied to sacral pressure injury.   Date of last CHG bath given: 4/16/18    Infusions: Heparin @ 20units.    Patient progressing towards goals as tolerated, plan of care communicated and reviewed with Jenni Todd and family. All concerns addressed. Will continue to monitor.

## 2019-04-16 NOTE — ASSESSMENT & PLAN NOTE
-LV EF of 25%, GDMT initiated today with small doses of Acei and B Blocker  Still with mechanical support (balloon pump) in place.   Will need further dialysis for volume removal.

## 2019-04-16 NOTE — SUBJECTIVE & OBJECTIVE
Interval History:   12 hour SLED completed this AM, net even volume status in the past 24 hours.  LHC revealed 99% occlusion of LAD, 80% LCX, and 100% RCA.  Elevated filling pressures and IABP was placed for cardiogenic shock and transferred back to CMICU, CTS consult for possible CABG.  She voices no complaints this morning except a dry cough.  Electrolytes are stable.    Review of patient's allergies indicates:   Allergen Reactions    Clindamycin Diarrhea    Flagyl [metronidazole hcl]     Metronidazole      Current Facility-Administered Medications   Medication Frequency    0.9%  NaCl infusion (CRRT USE ONLY) Continuous    0.9%  NaCl infusion (for blood administration) Q24H PRN    0.9%  NaCl infusion (for blood administration) Q24H PRN    albuterol-ipratropium 2.5 mg-0.5 mg/3 mL nebulizer solution 3 mL Q4H PRN    aspirin EC tablet 81 mg Daily    atorvastatin tablet 40 mg QHS    benzonatate capsule 100 mg TID PRN    calcitRIOL capsule 0.5 mcg Daily    clopidogrel tablet 75 mg Daily    dextrose 50% injection 12.5 g PRN    dextrose 50% injection 25 g PRN    glucagon (human recombinant) injection 1 mg PRN    glucose chewable tablet 16 g PRN    glucose chewable tablet 24 g PRN    heparin 25,000 units in dextrose 5% (100 units/ml) IV bolus from bag - ADDITIONAL PRN BOLUS - 30 units/kg (max bolus 4000 units) PRN    heparin 25,000 units in dextrose 5% (100 units/ml) IV bolus from bag - ADDITIONAL PRN BOLUS - 60 units/kg (max bolus 4000 units) PRN    heparin 25,000 units in dextrose 5% 250 mL (100 units/mL) infusion LOW INTENSITY nomogram - [facility] Continuous    heparin infusion 1,000 units/500 ml in 0.9% NaCl (pressure line flush) Continuous PRN    insulin aspart U-100 pen 0-5 Units QID (AC + HS) PRN    lisinopril tablet 2.5 mg Daily    loperamide capsule 2 mg TID PRN    magnesium sulfate 2g in water 50mL IVPB (premix) PRN    magnesium sulfate 2g in water 50mL IVPB (premix) PRN    [START ON  4/17/2019] metoprolol succinate (TOPROL-XL) 24 hr tablet 25 mg Daily    pantoprazole EC tablet 40 mg Daily    piperacillin-tazobactam 4.5 g in sodium chloride 0.9% 100 mL IVPB (ready to mix system) Q12H    sevelamer carbonate tablet 800 mg TID WM    sodium chloride 0.9% flush 10 mL PRN    vancomycin in dextrose 5 % 1 gram/250 mL IVPB 1,000 mg Once       Objective:     Vital Signs (Most Recent):  Temp: 98.5 °F (36.9 °C) (04/16/19 0701)  Pulse: 99 (04/16/19 1001)  Resp: 14 (04/16/19 1001)  BP: (!) 156/73 (04/16/19 0701)  SpO2: 100 % (04/16/19 1001)  O2 Device (Oxygen Therapy): room air (04/16/19 1001) Vital Signs (24h Range):  Temp:  [97.4 °F (36.3 °C)-98.7 °F (37.1 °C)] 98.5 °F (36.9 °C)  Pulse:  [] 99  Resp:  [1-35] 14  SpO2:  [93 %-100 %] 100 %  BP: ()/(54-74) 156/73     Weight: 97.1 kg (214 lb) (04/14/19 1815)  Body mass index is 33.52 kg/m².  Body surface area is 2.14 meters squared.    I/O last 3 completed shifts:  In: 4105.7 [P.O.:570; I.V.:2460.3; Blood:753.3; IV Piggyback:322.1]  Out: 3050 [Other:3050]    Physical Exam   Constitutional: She is oriented to person, place, and time. She appears well-developed and well-nourished. No distress.   HENT:   Head: Normocephalic and atraumatic.   Right Ear: External ear normal.   Left Ear: External ear normal.   Eyes: Conjunctivae and EOM are normal. Right eye exhibits no discharge. Left eye exhibits no discharge.   Neck: Normal range of motion. Neck supple.   Cardiovascular: Normal rate and regular rhythm. Exam reveals no gallop and no friction rub.   No murmur heard.  Pulmonary/Chest: Effort normal and breath sounds normal. No respiratory distress. She has no wheezes. She has no rales.   Abdominal: Soft. Bowel sounds are normal. She exhibits no distension. There is no tenderness.   Musculoskeletal: Normal range of motion. She exhibits no edema or deformity.   Neurological: She is alert and oriented to person, place, and time.   Skin: Skin is warm and  dry. She is not diaphoretic.   L femoral tunneled dialysis catheter   Psychiatric: She has a normal mood and affect. Her behavior is normal.       Significant Labs:  CBC:   Recent Labs   Lab 04/16/19  0529   WBC 8.73   RBC 2.66*   HGB 7.8*   HCT 25.2*      MCV 95   MCH 29.3   MCHC 31.0*     CMP:   Recent Labs   Lab 04/16/19  0529   *   CALCIUM 9.1   ALBUMIN 1.7*  1.7*   PROT 6.8      K 4.6   CO2 25      BUN 9   CREATININE 2.3*   ALKPHOS 121   *   AST 61*   BILITOT 0.6

## 2019-04-16 NOTE — PROGRESS NOTES
[facility]  Nephrology  Progress Note    Patient Name: Jenni Todd  MRN: [id number]  Admission Date: 4/12/2019  Hospital Length of Stay: 4 days  Attending Provider: Dionne Bullock MD   Primary Care Physician: Michael Tan Iii, MD  Principal Problem:Septic shock    Subjective:     Interval History:   12 hour SLED completed this AM, net even volume status in the past 24 hours.  LHC revealed 99% occlusion of LAD, 80% LCX, and 100% RCA.  Elevated filling pressures and IABP was placed for cardiogenic shock and transferred back to CMICU, CTS consult for possible CABG.  She voices no complaints this morning except a dry cough.  Electrolytes are stable.    Review of patient's allergies indicates:   Allergen Reactions    Clindamycin Diarrhea    Flagyl [metronidazole hcl]     Metronidazole      Current Facility-Administered Medications   Medication Frequency    0.9%  NaCl infusion (CRRT USE ONLY) Continuous    0.9%  NaCl infusion (for blood administration) Q24H PRN    0.9%  NaCl infusion (for blood administration) Q24H PRN    albuterol-ipratropium 2.5 mg-0.5 mg/3 mL nebulizer solution 3 mL Q4H PRN    aspirin EC tablet 81 mg Daily    atorvastatin tablet 40 mg QHS    benzonatate capsule 100 mg TID PRN    calcitRIOL capsule 0.5 mcg Daily    clopidogrel tablet 75 mg Daily    dextrose 50% injection 12.5 g PRN    dextrose 50% injection 25 g PRN    glucagon (human recombinant) injection 1 mg PRN    glucose chewable tablet 16 g PRN    glucose chewable tablet 24 g PRN    heparin 25,000 units in dextrose 5% (100 units/ml) IV bolus from bag - ADDITIONAL PRN BOLUS - 30 units/kg (max bolus 4000 units) PRN    heparin 25,000 units in dextrose 5% (100 units/ml) IV bolus from bag - ADDITIONAL PRN BOLUS - 60 units/kg (max bolus 4000 units) PRN    heparin 25,000 units in dextrose 5% 250 mL (100 units/mL) infusion LOW INTENSITY nomogram - [facility] Continuous    heparin infusion 1,000 units/500 ml in  0.9% NaCl (pressure line flush) Continuous PRN    insulin aspart U-100 pen 0-5 Units QID (AC + HS) PRN    lisinopril tablet 2.5 mg Daily    loperamide capsule 2 mg TID PRN    magnesium sulfate 2g in water 50mL IVPB (premix) PRN    magnesium sulfate 2g in water 50mL IVPB (premix) PRN    [START ON 4/17/2019] metoprolol succinate (TOPROL-XL) 24 hr tablet 25 mg Daily    pantoprazole EC tablet 40 mg Daily    piperacillin-tazobactam 4.5 g in sodium chloride 0.9% 100 mL IVPB (ready to mix system) Q12H    sevelamer carbonate tablet 800 mg TID WM    sodium chloride 0.9% flush 10 mL PRN    vancomycin in dextrose 5 % 1 gram/250 mL IVPB 1,000 mg Once       Objective:     Vital Signs (Most Recent):  Temp: 98.5 °F (36.9 °C) (04/16/19 0701)  Pulse: 99 (04/16/19 1001)  Resp: 14 (04/16/19 1001)  BP: (!) 156/73 (04/16/19 0701)  SpO2: 100 % (04/16/19 1001)  O2 Device (Oxygen Therapy): room air (04/16/19 1001) Vital Signs (24h Range):  Temp:  [97.4 °F (36.3 °C)-98.7 °F (37.1 °C)] 98.5 °F (36.9 °C)  Pulse:  [] 99  Resp:  [1-35] 14  SpO2:  [93 %-100 %] 100 %  BP: ()/(54-74) 156/73     Weight: 97.1 kg (214 lb) (04/14/19 1815)  Body mass index is 33.52 kg/m².  Body surface area is 2.14 meters squared.    I/O last 3 completed shifts:  In: 4105.7 [P.O.:570; I.V.:2460.3; Blood:753.3; IV Piggyback:322.1]  Out: 3050 [Other:3050]    Physical Exam   Constitutional: She is oriented to person, place, and time. She appears well-developed and well-nourished. No distress.   HENT:   Head: Normocephalic and atraumatic.   Right Ear: External ear normal.   Left Ear: External ear normal.   Eyes: Conjunctivae and EOM are normal. Right eye exhibits no discharge. Left eye exhibits no discharge.   Neck: Normal range of motion. Neck supple.   Cardiovascular: Normal rate and regular rhythm. Exam reveals no gallop and no friction rub.   No murmur heard.  Pulmonary/Chest: Effort normal and breath sounds normal. No respiratory distress. She  has no wheezes. She has no rales.   Abdominal: Soft. Bowel sounds are normal. She exhibits no distension. There is no tenderness.   Musculoskeletal: Normal range of motion. She exhibits no edema or deformity.   Neurological: She is alert and oriented to person, place, and time.   Skin: Skin is warm and dry. She is not diaphoretic.   L femoral tunneled dialysis catheter   Psychiatric: She has a normal mood and affect. Her behavior is normal.       Significant Labs:  CBC:   Recent Labs   Lab 04/16/19  0529   WBC 8.73   RBC 2.66*   HGB 7.8*   HCT 25.2*      MCV 95   MCH 29.3   MCHC 31.0*     CMP:   Recent Labs   Lab 04/16/19  0529   *   CALCIUM 9.1   ALBUMIN 1.7*  1.7*   PROT 6.8      K 4.6   CO2 25      BUN 9   CREATININE 2.3*   ALKPHOS 121   *   AST 61*   BILITOT 0.6            Assessment/Plan:     ESRD (end stage renal disease) on dialysis  ESRD on iHD TTS  [facility]  L femoral tunneled catheter (3/28)    49 yo female well known to our service who has been dialysis dependent for >15 years, who presents from SNF for hypotension and fever after hemodialysis on Thursday.  Nephrology has been consulted for ESRD management while in-patient.    HDS off pressor support.  IABP in place.    Plan/recommendations:  -plan for SCUF for volume management  -UF goal 350-400 cc/hr, goal to be net negative ~ 1-2L/24h.  -strict I/O's  -discontinue Renvlea while on SLED  -will closely monitor.    Andrew Ragsdale NP  Nephrology  [facility]

## 2019-04-16 NOTE — H&P
[facility]    Hospital Medicine  History and Physical Exam    Admit Date: 2019  Principal Problem:  Peritonitis associated with peritoneal dialysis   Primary care Physician: Michael Tan Iii, MD  Code status: Full Code    HPI:   48 year old female w/ CAD s/p 2 PCI(), HFrEF 30%, HTN, DM, Vascular access issues and ESRD who was on home PD who presents to SNF following a hospitalization for peritonitis. Patient on PD at home x 16 yrs. Hospital course complicated by GIB, NSTEMI and vascular access issues.      Patient will be treated at [facility] with PT and OT to improve functional status and ability to perform ADLs.      Past Medical History: Patient has a past medical history of Abnormal finding on Pap smear, HPV DNA positive (), Anemia associated with chronic renal failure, Blood type B+, Bulging discs - symptomatic , CAD (coronary artery disease), Cardiomyopathy (3/13/2019), Diabetes mellitus, type 2, ESRD (end stage renal disease) (2004), FSGS (focal segmental glomerulosclerosis), Hyperlipidemia, Hypertension (), Neuropathy, NSTEMI (non-ST elevated myocardial infarction) (3/29/2019), Obesity, Secondary hyperparathyroidism, renal, TIA (transient ischemic attack), and Uterine fibroid.    Past Surgical History: Patient has a past surgical history that includes Peritoneal catheter insertion; Umbilical hernia repair;  section, classic; Dialysis fistula creation; Cardiac catheterization; Incision and drainage of wound (Left, ); Open reduction and internal fixation (ORIF) of injury of hip (Left, 2018); Colonoscopy (N/A, 2018); Esophagogastroduodenoscopy (N/A, 3/13/2019); Esophagogastroduodenoscopy (N/A, 3/14/2019); Peritoneal catheter removal (N/A, 3/14/2019); Insertion of tunneled central venous hemodialysis catheter (N/A, 3/18/2019); Phlebography (Right, 3/20/2019); Placement of arteriovenous graft (Right, 3/22/2019); and Insertion of tunneled central venous hemodialysis catheter  (N/A, 3/28/2019).    Social History: Patient reports that she has never smoked. She has never used smokeless tobacco. She reports that she does not drink alcohol or use drugs.    Family History: family history includes Cancer in her maternal grandmother and paternal grandfather; Diabetes in her maternal aunt and paternal aunt.    Medications: reviewed     Allergies: Patient is allergic to clindamycin; flagyl [metronidazole hcl]; and metronidazole.    ROS    Constitutional: neg for ever or chills  Respiratory: neg for cough or shortness of breath  Cardiovascular: neg for chest pain or palpitations SOB / unable to walk more than 100 ft without stopping  Gastrointestinal: neg for nausea or vomiting, no abdominal pain or change in bowel habits  Genitourinary: neg for hematuria or dysuria  Integument/Breast: neg for rash or pruritis  Hematologic/Lymphatic: neg for easy bruising or lymphadenopathy  Musculoskeletal: neg for arthralgias or myalgias  Neurological: severe aching of feet and sometimes hands   Behavioral/Psych: neg for depression or anxiety    PEx  Temp:  [97.4 °F (36.3 °C)-98.8 °F (37.1 °C)]   Pulse:  []   Resp:  [7-35]   BP: ()/(54-74)   SpO2:  [95 %-100 %]   Body mass index is 31.33 kg/m².     General appearance: no distress  Mental status: Alert and oriented x 3  HEENT:  conjunctivae/corneas clear, PERRL  Neck: supple, thyroid not enlarged  Pulm:   normal respiratory effort, CTA   Card: RRR, no murmur  Abd: soft, NT, ND, BS present; no masses, no organomegaly  Ext: no edema  Pulses: cold feet w no pulses  Skin: color, texture, turgor normal. No rashes or lesions  Neuro: CN II-XII grossly intact, no focal numbness or weakness, normal strength and tone     Assessment and Plan:    NSTEMI (non-ST elevated myocardial infarction)  CAD  DAPT held due to GIB 3/13 and need for procedures here.   Plavix restarted given recent GIB per cards recs 3/29.    Holding BB due to recent low BP  Cardiology follow  up     Peritonitis due to peritoneal dialysis catheter   Finished course of cefepime as per ID  Wound care consulted for PD site wound care     Acute gastric ulcer with hemorrhage  GIB  no more bleeding, on PPI  biopsy neg for malignancy or H.pylori  4/8 H&H stable at 7.6/25.9, will trend CBC in the am     Pressure injury stage 2 on left buttock   consulted wound care     Chronic systolic heart failure  EF 30% on TTE, stable  Hold antihypertensive due to low BP, resume as able, can be done OP     Diabetes mellitus, type 2  A1c 6.3, was getting intra-peritoneal insulin. Catheter now removed.   detemir 10 units nightly      Debility  Generalized weakness  Debility  Continue with PT/OT for gait training and strengthening and restoration of ADL's   Encourage mobility, OOB in chair, and early ambulation as appropriate  Fall precautions      Diabetic neuropathy  gabapentin 900 mg t.i.d.    I certify that SNF services are required to be given on an inpatient basis because Adilia Tolliver needs for skilled nursing care and/or skilled rehabilitation are required on a daily basis and such services can only practically be provided in a skilled nursing facility setting and are for an ongoing condition for which she received inpatient care in the hospital.     Time (minutes) spent in care of the patient (Greater than 1/2 spent in direct face-to-face contact) 40    Carl Renee MD, Staff Physician, [facility],

## 2019-04-16 NOTE — CONSULTS
"[facility]  Cardiothoracic Surgery  Consult Note    Patient Name: Jenni Todd  MRN: [id number]  Admission Date: 4/12/2019  Attending Physician: Dionne Bullock MD  Referring Provider: Self, Aaareferral    Patient information was obtained from patient and past medical records.     Inpatient consult to Cardiothoracic Surgery  Consult performed by: Ellyn Kenyon NP  Consult ordered by: Mine Baxter MD  Reason for consult: CABG eval         Subjective:     Principal Problem: Septic shock    History of Present Illness: Ms. Jenni Todd is a 48 y.o. female w/ CAD s/p 2 PCI (2012), HFrEF 30%, HTN, DM, and ESRD who presented from SNF for hypotension and fever. Admitted to the MICU for septic Shock. Patient earlier today was getting dialysis and her blood pressure was low. She was also noted to have a fever of 101 and was sent to the ED. Per patient she nanci good with no complaints. She denies any cough, nausea, vomiting or abdominal pain , she complained of feeling dizzy. This was her first episode of fever. Patient stated that she was recently discharged a week ago to SNF after developing  peritonitis. She used to do PD but switched to regular HD after peritonitis. PD cath after becoming septic from peritonitis. Attempts to place tunneled IJ catheters and grafts failed. She has a left femoral catheter placed for HD . Last HD today, took off 1.5 L. Patient reports BP had been low all day since HD, approximately 80 systolic. Her only complaints is "a funny feeling in my center chest" that is not tightness, pressure, or pain.    In the ED patient found hypotensive with lactate > 12.             No current facility-administered medications on file prior to encounter.      Current Outpatient Medications on File Prior to Encounter   Medication Sig    amLODIPine (NORVASC) 10 MG tablet Take 10 mg by mouth once daily.    aspirin (ECOTRIN) 81 MG EC tablet Take 1 tablet (81 mg " total) by mouth once daily.    atorvastatin (LIPITOR) 40 MG tablet Take 40 mg by mouth every evening.     calcitRIOL (ROCALTROL) 0.25 MCG Cap Take 0.5 mcg by mouth once daily.     cinacalcet (SENSIPAR) 90 MG Tab Take 90 mg by mouth once daily.    epoetin adonay 10,000 unit/mL Soln 1 mL, epoetin adonay 20,000 unit/mL Soln 1 mL Inject 30,000 Units into the vein every 14 (fourteen) days.    gabapentin (NEURONTIN) 100 MG capsule 1 capsule 3 (three) times daily.    insulin detemir U-100 (LEVEMIR FLEXTOUCH) 100 unit/mL (3 mL) SubQ InPn pen Inject 10 Units into the skin every evening.    nateglinide (STARLIX) 120 MG tablet STARLIX 120MG 30 MINUTES BEFORE EACH MEAL.    ONETOUCH VERIO Strp USE 1 STRIP ONCE DAILY IN VITRO    pantoprazole (PROTONIX) 40 MG tablet Take 1 tablet (40 mg total) by mouth once daily.    sevelamer carbonate (RENVELA) 800 mg Tab Take 1 tablet (800 mg total) by mouth 3 (three) times daily with meals.    vitamin renal formula, B-complex-vitamin c-folic acid, (B COMPLEX-C-FOLIC ACID) 1 mg Cap Take 1 capsule by mouth once daily. 1 Capsule Oral Every day       Review of patient's allergies indicates:   Allergen Reactions    Clindamycin Diarrhea    Flagyl [metronidazole hcl]     Metronidazole        Past Medical History:   Diagnosis Date    Abnormal finding on Pap smear, HPV DNA positive 2014    Anemia associated with chronic renal failure     on Epogen    Blood type B+     Bulging discs - symptomatic      CAD (coronary artery disease)     Cardiomyopathy 3/13/2019    Diabetes mellitus, type 2     ESRD (end stage renal disease) 04/20/2004    FSGS (focal segmental glomerulosclerosis)     with collapsing    Hyperlipidemia     Hypertension 2004    Neuropathy     NSTEMI (non-ST elevated myocardial infarction) 3/29/2019    Obesity     Secondary hyperparathyroidism, renal     TIA (transient ischemic attack)     Uterine fibroid     small uterine      Past Surgical History:   Procedure  Laterality Date    CARDIAC CATHETERIZATION      PCI x 2     SECTION, CLASSIC      COLONOSCOPY N/A 2018    Performed by Rodrigue Russell MD at [facility] ENDO (2ND FLR)    DEBRIDEMENT-WOUND Left 2016    Performed by Teressa Maddox MD at [facility] [place] operated by [facility] OR    DIALYSIS FISTULA CREATION      multiple fistulas and grafts before PD     EGD (ESOPHAGOGASTRODUODENOSCOPY) N/A 3/14/2019    Performed by Adolph [facility]ila MD at [facility] ENDO    EGD (ESOPHAGOGASTRODUODENOSCOPY) N/A 3/13/2019    Performed by Adolph [facility]ila MD at [facility] ENDO    INCISION AND DRAINAGE (I&D), LABIAL N/A 2016    Performed by Harmony Fernandez MD at [facility] [place] OR    INCISION AND DRAINAGE (I&D), LABIAL (ADD ON) Left 2016    Performed by Teressa Maddox MD at [facility] [place] OR    INCISION AND DRAINAGE OF WOUND Left     VULVAR ABCESS WITH NECROSIS    Insertion, Catheter, Central Venous, Hemodialysis N/A 3/28/2019    Performed by Kimo Weeks MD at [facility] CATH LAB    Insertion, Catheter, Central Venous, Hemodialysis N/A 3/18/2019    Performed by iKmo Weeks MD at [facility] CATH LAB    INSERTION, CATHETER, TUNNELED ABORTED Left 3/14/2019    Performed by Servando Solis MD at [facility] OR    INSERTION, GRAFT, ARTERIOVENOUS, RIGHT ARM Right 3/22/2019    Performed by BESSIE Wynn III, MD at [facility] OR 2ND FLR    ORIF, HIP Left 2018    Performed by Tevin Grullon MD at [facility] [place] OR    PERITONEAL CATHETER INSERTION      PERMCATH REWIRE- TUNNELED CATH REWIRE Left 2017    Performed by Baldev Munroe MD at [facility] [place] CATH LAB    PERMCATH REWIRE- TUNNELED CATH REWIRE N/A 10/5/2017    Performed by Baldev Munroe MD at [facility] [place] CATH LAB    REMOVAL, CATHETER, DIALYSIS, PERITONEAL N/A 3/14/2019    Performed by Servando Solis MD at [facility] OR    UMBILICAL HERNIA REPAIR      Venogram, possible intervention Right 3/20/2019    Performed by BESSIE Wynn III, MD at [facility] CATH LAB     Family History     Problem Relation (Age of Onset)    Cancer  Maternal Grandmother, Paternal Grandfather    Diabetes Maternal Aunt, Paternal Aunt        Tobacco Use    Smoking status: Never Smoker    Smokeless tobacco: Never Used   Substance and Sexual Activity    Alcohol use: No     Comment: Pt reports some social use of about 1-2 drinks about every six months.    Drug use: No    Sexual activity: Not Currently     Partners: Male     Birth control/protection: None     Review of Systems   Constitutional: Negative for activity change and fatigue.   Respiratory: Negative for cough and shortness of breath.    Cardiovascular: Negative for chest pain, palpitations and leg swelling.   Gastrointestinal: Negative for abdominal pain, nausea and vomiting.   Endocrine: Negative for polydipsia, polyphagia and polyuria.   Genitourinary: Negative for dysuria.   Musculoskeletal: Negative for gait problem.   Skin: Negative for rash.   Allergic/Immunologic: Negative for immunocompromised state.   Neurological: Negative for dizziness, syncope and weakness.   Hematological: Does not bruise/bleed easily.   Psychiatric/Behavioral: Negative for behavioral problems.     Objective:     Vital Signs (Most Recent):  Temp: 98.8 °F (37.1 °C) (04/16/19 1100)  Pulse: 100 (04/16/19 1100)  Resp: 19 (04/16/19 1100)  BP: (!) 156/73 (04/16/19 0701)  SpO2: 97 % (04/16/19 1100) Vital Signs (24h Range):  Temp:  [97.4 °F (36.3 °C)-98.8 °F (37.1 °C)] 98.8 °F (37.1 °C)  Pulse:  [] 100  Resp:  [1-35] 19  SpO2:  [93 %-100 %] 97 %  BP: ()/(54-74) 156/73     Weight: 97.1 kg (214 lb)  Body mass index is 33.52 kg/m².    SpO2: 97 %  O2 Device (Oxygen Therapy): room air     Intake/Output - Last 3 Shifts       04/14 0700 - 04/15 0659 04/15 0700 - 04/16 0659 04/16 0700 - 04/17 0659    P.O. 180 390     I.V. (mL/kg) 474.7 (4.9) 2285.3 (23.5) 474 (4.9)    Blood  753.3     IV Piggyback 100 222.1 100    Total Intake(mL/kg) 754.7 (7.8) 3650.7 (37.6) 574 (5.9)    Urine (mL/kg/hr) 0 (0)      Other  3050 868    Stool  0  0    Total Output 0 3050 868    Net +754.7 +600.7 -294           Stool Occurrence  2 x 2 x           Lines/Drains/Airways     Central Venous Catheter Line                 Hemodialysis Catheter 03/28/19 1528 left femoral 18 days          Drain                 Hemodialysis AV Fistula Left upper arm -- days         Hemodialysis AV Graft Right upper arm -- days          Peripheral Intravenous Line                 Peripheral IV - Single Lumen 04/12/19 0155 22 G Left Hand 4 days         Peripheral IV - Single Lumen 04/12/19 0300 20 G Left Forearm 4 days                 STS Risk Score: 5.8%    Physical Exam   Constitutional: She is oriented to person, place, and time. She appears well-developed and well-nourished.   Cardiovascular: Normal rate, regular rhythm and normal heart sounds.   Pulmonary/Chest: Effort normal and breath sounds normal.   Abdominal: Soft.   Neurological: She is alert and oriented to person, place, and time.   Skin: Skin is warm, dry and intact.   Psychiatric: She has a normal mood and affect.       Significant Labs:  BMP:   Recent Labs   Lab 04/16/19  0529   *      K 4.6      CO2 25   BUN 9   CREATININE 2.3*   CALCIUM 9.1   MG 1.8     CBC:   Recent Labs   Lab 04/16/19  0529   WBC 8.73   RBC 2.66*   HGB 7.8*   HCT 25.2*      MCV 95   MCH 29.3   MCHC 31.0*       Significant Diagnostics:  LHC:   Left Main   There is moderate diffuse disease throughout the vessel.   Left Anterior Descending   Prox LAD lesion is 99% stenosed.   Left Circumflex   Prox Cx lesion is 80% stenosed.   Right Coronary Artery   Mid RCA to Dist RCA lesion is 100% stenosed.     ECHO:  · Severely decreased left ventricular systolic function. The estimated ejection fraction is 25%  · Left ventricular diastolic dysfunction.  · Local segmental wall motion abnormalities.  · Low normal right ventricular systolic function.  · Severe left atrial enlargement.  · Mild-to-moderate aortic valve stenosis.  · Aortic valve  area is 1.48 cm2; peak velocity is 1.91 m/s; mean gradient is 10.27 mmHg.  · Moderate mitral sclerosis.  · Moderate to moderate-severe (2-3+) mitral regurgitation.  · Mild to moderate tricuspid regurgitation.  · Mild pulmonic regurgitation.  · Normal central venous pressure (3 mm Hg).  · The estimated PA systolic pressure is 32 mm Hg    Assessment/Plan:     NYHA Score: NYHA I: cardiac disease, but without resulting limitations of physical activity    CAD (coronary artery disease)  Multiple concerns for surgical intervention due to co morbidities, low albumin and moderatly high STS Score. Dr. Meléndez to review cath and give final recommendations. Would consult Interventional Cardiology for high risk PCI.         Thank you for your consult. I will sign off. Please contact us if you have any additional questions.    Ellyn Kenyon NP  Cardiothoracic Surgery  [facility]    I have seen the patient and reviewed the nurse practitioner's note above. I have personally interviewed and examined the patient at bedside and agree with the findings.     Ms. Todd is a 48 year old female with CAD s/p PCI (2012), HFrEF (25% ejection fraction), ESRD on HD, DM (HbA1C 6.1), BMI 34, hypertension, and history of multiple admissions for sepsis, who was admitted to the ICU with fever and septic shock, then found to have NSTEMI, underwent cath which shows multi-vessel coronary artery disease.  Currently, she is in the ICU, no chest pain, with IABP.      Given that her albumin is 1.7 (and has been low for months), on dialysis, and 25% ejection fraction, surgery is very high risk, especially considering her likely ten year survival is low.  I recommend PCI vs medical management over cardiac surgery.  Please contact me with questions.      Jewel Carrasco MD  Cardiothoracic Surgery  [facility]

## 2019-04-16 NOTE — PROGRESS NOTES
[facility]  Cardiology  Progress Note    Patient Name: Jenni Todd  MRN: [id number]  Admission Date: 4/12/2019  Hospital Length of Stay: 4 days  Code Status: Full Code   Attending Physician: Dionne Bullock MD   Primary Care Physician: Michael Tan Iii, MD  Expected Discharge Date:   Principal Problem:Septic shock    Subjective:       Interval History: Patient admitted to CCU overnight after angiogram with balloon pump left in place. She has no complaints this AM.     Review of Systems   Constitution: Negative for chills, decreased appetite and diaphoresis.   HENT: Negative for congestion and ear discharge.    Eyes: Negative for blurred vision and discharge.   Cardiovascular: Negative for chest pain, dyspnea on exertion, irregular heartbeat, leg swelling and paroxysmal nocturnal dyspnea.   Respiratory: Negative for cough, hemoptysis and shortness of breath.    Gastrointestinal: Negative for abdominal pain.     Objective:     Vital Signs (Most Recent):  Temp: 99.1 °F (37.3 °C) (04/16/19 1500)  Pulse: 100 (04/16/19 1800)  Resp: 19 (04/16/19 1800)  BP: (!) 156/73 (04/16/19 0701)  SpO2: 97 % (04/16/19 1800) Vital Signs (24h Range):  Temp:  [97.4 °F (36.3 °C)-99.1 °F (37.3 °C)] 99.1 °F (37.3 °C)  Pulse:  [] 100  Resp:  [7-35] 19  SpO2:  [95 %-100 %] 97 %  BP: ()/(54-73) 156/73     Weight: 97.1 kg (214 lb)  Body mass index is 33.52 kg/m².     SpO2: 97 %  O2 Device (Oxygen Therapy): room air      Intake/Output Summary (Last 24 hours) at 4/16/2019 1847  Last data filed at 4/16/2019 1800  Gross per 24 hour   Intake 3964.63 ml   Output 3918 ml   Net 46.63 ml       Lines/Drains/Airways     Central Venous Catheter Line                 Hemodialysis Catheter 03/28/19 1528 left femoral 19 days          Drain                 Hemodialysis AV Fistula Left upper arm -- days         Hemodialysis AV Graft Right upper arm -- days          Peripheral Intravenous Line                 Peripheral IV -  Single Lumen 04/12/19 0155 22 G Left Hand 4 days         Peripheral IV - Single Lumen 04/12/19 0300 20 G Left Forearm 4 days                Physical Exam   Constitutional: She is oriented to person, place, and time. She appears well-developed and well-nourished. No distress.   HENT:   Mouth/Throat: Oropharynx is clear and moist.   Eyes: Conjunctivae are normal. No scleral icterus.   Neck: No tracheal deviation present. No thyromegaly present.   Cardiovascular: Normal rate, regular rhythm, normal heart sounds and intact distal pulses. Exam reveals no gallop and no friction rub.   No murmur heard.  IABP R CFA  Dopplerable DP bilaterally   Pulmonary/Chest: Effort normal and breath sounds normal. No respiratory distress. She has no wheezes. She has no rales.   Abdominal: Soft. Bowel sounds are normal. She exhibits distension. There is no tenderness.   Musculoskeletal: She exhibits no edema or deformity.   Neurological: She is alert and oriented to person, place, and time. No cranial nerve deficit. Coordination normal.   Skin: Skin is warm and dry. She is not diaphoretic.   Psychiatric: She has a normal mood and affect. Her behavior is normal.       Significant Labs:   Recent Lab Results       04/16/19  1159   04/16/19  1151   04/16/19  0827   04/16/19  0623   04/16/19  0620        Unit Blood Type Code               Unit Expiration               Unit Blood Type               Albumin   1.7           Alkaline Phosphatase               ALT               Anion Gap   11           aPTT   44.4  Comment:  aPTT therapeutic range = 39-69 seconds     45.7  Comment:  aPTT therapeutic range = 39-69 seconds              45.7  Comment:  aPTT therapeutic range = 39-69 seconds     AST               Baso #               Basophil%               Bilirubin, Direct               BILIRUBIN TOTAL               BUN, Bld   8           Calcium   9.5           Chloride   102           CO2   23           CODING SYSTEM               Creatinine   2.3            Differential Method               DISPENSE STATUS               eGFR if    27.9           eGFR if non    24.2  Comment:  Calculation used to obtain the estimated glomerular filtration  rate (eGFR) is the CKD-EPI equation.              Eos #               Eosinophil%               Glucose   157           Gran # (ANC)               Gran%               Group & Rh               Hematocrit               Hemoglobin               Immature Grans (Abs)               Immature Granulocytes               INDIRECT KYLEIGH               Lymph #               Lymph%               Magnesium   1.7           MCH               MCHC               MCV               Mono #               Mono%               MPV               nRBC               Phosphorus   3.4           Platelets               POCT Glucose 183   147 149       Potassium   4.6           PRODUCT CODE               PROTEIN TOTAL               RBC               RDW               Sodium   136           UNIT NUMBER               Vancomycin, Random               WBC                                04/16/19  0529   04/15/19  2315   04/15/19  2311   04/15/19  2132   04/15/19  2130        Unit Blood Type Code     7300[P]         Unit Expiration     [id number][P]         Unit Blood Type     B POS[P]         Albumin 1.7       1.7      1.7             Alkaline Phosphatase 121                          Anion Gap 10       9     aPTT   33.0  Comment:  aPTT therapeutic range = 39-69 seconds           AST 61             Baso # 0.03       0.03     Basophil% 0.3       0.3     Bilirubin, Direct 0.4             BILIRUBIN TOTAL 0.6  Comment:  For infants and newborns, interpretation of results should be based  on gestational age, weight and in agreement with clinical  observations.  Premature Infant recommended reference ranges:  Up to 24 hours.............<8.0 mg/dL  Up to 48 hours............<12.0 mg/dL  3-5 days..................<15.0 mg/dL  6-29  days.................<15.0 mg/dL               BUN, Bld 9       17     Calcium 9.1       9.3     Chloride 102       102     CO2 25       25     CODING SYSTEM     [id number][P]         Creatinine 2.3       3.7     Differential Method Automated       Automated     DISPENSE STATUS     ISSUED[P]         eGFR if  27.9       15.7     eGFR if non  24.2  Comment:  Calculation used to obtain the estimated glomerular filtration  rate (eGFR) is the CKD-EPI equation.          13.6  Comment:  Calculation used to obtain the estimated glomerular filtration  rate (eGFR) is the CKD-EPI equation.        Eos # 0.2       0.2     Eosinophil% 2.7       1.9     Glucose 141       149     Gran # (ANC) 5.4       5.8     Gran% 61.4       65.5     Group & Rh     B POS         Hematocrit 25.2       22.0     Hemoglobin 7.8       6.8     Immature Grans (Abs) 0.03  Comment:  Mild elevation in immature granulocytes is non specific and   can be seen in a variety of conditions including stress response,   acute inflammation, trauma and pregnancy. Correlation with other   laboratory and clinical findings is essential.         0.04  Comment:  Mild elevation in immature granulocytes is non specific and   can be seen in a variety of conditions including stress response,   acute inflammation, trauma and pregnancy. Correlation with other   laboratory and clinical findings is essential.       Immature Granulocytes 0.3       0.5     INDIRECT KYLEIGH     NEG         Lymph # 2.6       2.3     Lymph% 29.2       26.3     Magnesium 1.8       1.8     MCH 29.3       30.4     MCHC 31.0       30.9     MCV 95       98     Mono # 0.5       0.5     Mono% 6.1       5.5     MPV 10.6       10.6     nRBC 2       2     Phosphorus 3.0       4.2     Platelets 253       270     POCT Glucose       163       Potassium 4.6       4.7     PRODUCT CODE     [id number][P]         PROTEIN TOTAL 6.8             RBC 2.66       2.24     RDW 19.8       17.0      Sodium 137       136     UNIT NUMBER     [id number][P]         Vancomycin, Random 14.2             WBC 8.73       8.83                            Assessment and Plan:     Brief HPI: 48 F with ESRD on HD through tunneled femoral line presented with hypotension suspected 2/2 septic shock, found to have NSTEMI and 3 vessel disease on angiogram. Now with balloon pump in place off pressors.     * Cardiogenic shock  NSTEMI    Troponemia with atypical chest pain. S/p angiogram showing multi-vessel disease, now with balloon pump support in place.   - Cont IABP 1:1, will wean tomorrow.   - Vol status improving, will need further dialysis.       Pressure injury of sacral region, stage 2  Wound care consult    Septic shock  - Improving despite WBC of 15k  - Cultures NGTD, resent.   - Afebrile  -Cont antibiotics  - Lactates WNL  No obvious source of infection, ?intra-abdominal.       NSTEMI (non-ST elevated myocardial infarction)  -c/w ACS protocol (DAPT- ASA/Plavix and heparin drip)   -NSTEMI with troponin up to 16, [facility] today with 3 vessel disease 99% LAD, 80%LCX, 100% RCA, LVEDP in 30s, hypotensive, IABP placed. CT surgery consulted for CABG Eval.      Acute on chronic systolic heart failure  -LV EF of 25%, GDMT initiated today with small doses of Acei and B Blocker  Still with mechanical support (balloon pump) in place.   Will need further dialysis for volume removal.     ESRD (end stage renal disease) on dialysis  - Cont dialysis via L Femoral tunneled dialysis line    Anemia in chronic kidney disease, on chronic dialysis  Epo per nephrology  1 unit Hgb transfused this admission.       VTE Risk Mitigation (From admission, onward)        Ordered     heparin infusion 1,000 units/500 ml in 0.9% NaCl (pressure line flush)  Intra-op continuous PRN      04/15/19 1556     heparin 25,000 units in dextrose 5% 250 mL (100 units/mL) infusion LOW INTENSITY nomogram - [facility]  Continuous      04/12/19 0916     heparin 25,000 units in  dextrose 5% (100 units/ml) IV bolus from bag - ADDITIONAL PRN BOLUS - 60 units/kg (max bolus 4000 units)  As needed (PRN)      04/12/19 0916     heparin 25,000 units in dextrose 5% (100 units/ml) IV bolus from bag - ADDITIONAL PRN BOLUS - 30 units/kg (max bolus 4000 units)  As needed (PRN)      04/12/19 0916     IP VTE HIGH RISK PATIENT  Once      04/12/19 0440          Jorge A Gomes MD   Internal Medicine PGY-2  [phone]

## 2019-04-16 NOTE — ASSESSMENT & PLAN NOTE
NSTEMI    Troponemia with atypical chest pain. S/p angiogram showing multi-vessel disease, now with balloon pump support in place.   - Cont IABP 1:1, will wean tomorrow.   - Vol status improving, will need further dialysis.

## 2019-04-16 NOTE — CONSULTS
ALEX consult for PICC placement    Nephrology called for clearance per protocol: GFR <45    Per Dr. Germain: PICC placement denied    Team notified

## 2019-04-16 NOTE — ASSESSMENT & PLAN NOTE
- Improving despite WBC of 15k  - Cultures NGTD, resent.   - Afebrile  -Cont antibiotics  - Lactates WNL  No obvious source of infection, ?intra-abdominal.

## 2019-04-16 NOTE — PROGRESS NOTES
Pharmacokinetic Initial Assessment: IV Vancomycin    Assessment/Plan:    Initiate intravenous vancomycin with a dose of 1000mg once with subsequent doses when random concentrations are less than 25 mcg/ml  Desired empiric serum trough concentration is 10 to 20 mcg/mL.  Draw vancomycin random level on 4/17 at am labs..  Pharmacy will continue to follow and monitor vancomycin.      Please contact pharmacy at extension [phone] with any questions regarding this assessment.     Thank you for the consult,   Sheree Workman     Patient brief summary:  Jenni Todd is a 48 y.o. female initiated on antimicrobial therapy with IV Vancomycin for treatment of suspected intra-abdominal    Drug Allergies:   Review of patient's allergies indicates:   Allergen Reactions    Clindamycin Diarrhea    Flagyl [metronidazole hcl]     Metronidazole        Actual Body Weight:   97kg     Renal Function:   Estimated Creatinine Clearance: 35.4 mL/min (A) (based on SCr of 2.3 mg/dL (H)).,     Dialysis Method (if applicable):  12 hr SLED at night    CBC (last 72 hours):  Recent Labs   Lab Result Units 04/14/19  0040 04/15/19  0343 04/15/19  2130 04/16/19  0529   WBC K/uL 15.64* 12.39 8.83 8.73   Hemoglobin g/dL 7.5* 7.1* 6.8* 7.8*   Hematocrit % 23.3* 21.9* 22.0* 25.2*   Platelets K/uL 251 263 270 253   Gran% % 82.0* 58.6 65.5 61.4   Lymph% % 13.6* 33.5 26.3 29.2   Mono% % 2.6* 4.9 5.5 6.1   Eosinophil% % 0.1 1.0 1.9 2.7   Basophil% % 0.4 0.5 0.3 0.3   Differential Method  Automated Automated Automated Automated       Metabolic Panel (last 72 hours):  Recent Labs   Lab Result Units 04/13/19  1016 04/13/19 2039 04/14/19  2248 04/15/19  0343 04/15/19  1430 04/15/19  1800 04/15/19  2130 04/16/19  0529   Sodium mmol/L 137 138 135* 136 136 136 136 137   Potassium mmol/L 4.7 4.4 4.8 4.8 4.9 5.0 4.7 4.6   Chloride mmol/L 97 102 100 100 100 100 102 102   CO2 mmol/L 24 26 23 24 25 24 25 25   Glucose mg/dL 188* 145* 184* 159* 136* 121* 149* 141*    BUN, Bld mg/dL 32* 18 24* 25* 26* 27* 17 9   Creatinine mg/dL 6.0* 3.5* 4.9* 5.2* 5.5* 5.8* 3.7* 2.3*   Albumin g/dL 1.8* 1.7* 1.6* 1.7*  1.7* 1.6* 1.7* 1.7* 1.7*  1.7*   Total Bilirubin mg/dL  --   --   --  0.3  --  0.4  --  0.6   Alkaline Phosphatase U/L  --   --   --  112  --  99  --  121   AST U/L  --   --   --  143*  --  86*  --  61*   ALT U/L  --   --   --  348*  --  274*  --  237*   Magnesium mg/dL  --  1.6 1.6 2.0 1.9  --  1.8 1.8   Phosphorus mg/dL 6.3* 3.6 5.4* 5.6* 6.2*  --  4.2 3.0       Drug levels (last 3 results):  Recent Labs   Lab Result Units 04/15/19  0343 04/16/19  0529   Vancomycin, Random ug/mL 25.7 14.2       Microbiologic Results:  Microbiology Results (last 7 days)     Procedure Component Value Units Date/Time    Blood culture #1 **CANNOT BE ORDERED STAT** [[id number]] Collected:  04/12/19 0204    Order Status:  Completed Specimen:  Blood from Peripheral, Wrist, Left Updated:  04/16/19 0812     Blood Culture, Routine No Growth to date     Blood Culture, Routine No Growth to date     Blood Culture, Routine No Growth to date     Blood Culture, Routine No Growth to date     Blood Culture, Routine No Growth to date    Blood culture #2 **CANNOT BE ORDERED STAT** [[id number]] Collected:  04/12/19 0222    Order Status:  Completed Specimen:  Blood from Peripheral, Hand, Left Updated:  04/16/19 0812     Blood Culture, Routine No Growth to date     Blood Culture, Routine No Growth to date     Blood Culture, Routine No Growth to date     Blood Culture, Routine No Growth to date     Blood Culture, Routine No Growth to date

## 2019-04-16 NOTE — ASSESSMENT & PLAN NOTE
Multiple concerns for surgical intervention due to co morbidities, low albumin and moderatly high STS Score. Dr. Meléndez to review cath and give final recommendations. Would consult Interventional Cardiology for high risk PCI.

## 2019-04-16 NOTE — SUBJECTIVE & OBJECTIVE
Interval History: Patient admitted to CCU overnight after angiogram with balloon pump left in place. She has no complaints this AM.     Review of Systems   Constitution: Negative for chills, decreased appetite and diaphoresis.   HENT: Negative for congestion and ear discharge.    Eyes: Negative for blurred vision and discharge.   Cardiovascular: Negative for chest pain, dyspnea on exertion, irregular heartbeat, leg swelling and paroxysmal nocturnal dyspnea.   Respiratory: Negative for cough, hemoptysis and shortness of breath.    Gastrointestinal: Negative for abdominal pain.     Objective:     Vital Signs (Most Recent):  Temp: 99.1 °F (37.3 °C) (04/16/19 1500)  Pulse: 100 (04/16/19 1800)  Resp: 19 (04/16/19 1800)  BP: (!) 156/73 (04/16/19 0701)  SpO2: 97 % (04/16/19 1800) Vital Signs (24h Range):  Temp:  [97.4 °F (36.3 °C)-99.1 °F (37.3 °C)] 99.1 °F (37.3 °C)  Pulse:  [] 100  Resp:  [7-35] 19  SpO2:  [95 %-100 %] 97 %  BP: ()/(54-73) 156/73     Weight: 97.1 kg (214 lb)  Body mass index is 33.52 kg/m².     SpO2: 97 %  O2 Device (Oxygen Therapy): room air      Intake/Output Summary (Last 24 hours) at 4/16/2019 1847  Last data filed at 4/16/2019 1800  Gross per 24 hour   Intake 3964.63 ml   Output 3918 ml   Net 46.63 ml       Lines/Drains/Airways     Central Venous Catheter Line                 Hemodialysis Catheter 03/28/19 1528 left femoral 19 days          Drain                 Hemodialysis AV Fistula Left upper arm -- days         Hemodialysis AV Graft Right upper arm -- days          Peripheral Intravenous Line                 Peripheral IV - Single Lumen 04/12/19 0155 22 G Left Hand 4 days         Peripheral IV - Single Lumen 04/12/19 0300 20 G Left Forearm 4 days                Physical Exam   Constitutional: She is oriented to person, place, and time. She appears well-developed and well-nourished. No distress.   HENT:   Mouth/Throat: Oropharynx is clear and moist.   Eyes: Conjunctivae are normal.  No scleral icterus.   Neck: No tracheal deviation present. No thyromegaly present.   Cardiovascular: Normal rate, regular rhythm, normal heart sounds and intact distal pulses. Exam reveals no gallop and no friction rub.   No murmur heard.  IABP R CFA  Dopplerable DP bilaterally   Pulmonary/Chest: Effort normal and breath sounds normal. No respiratory distress. She has no wheezes. She has no rales.   Abdominal: Soft. Bowel sounds are normal. She exhibits distension. There is no tenderness.   Musculoskeletal: She exhibits no edema or deformity.   Neurological: She is alert and oriented to person, place, and time. No cranial nerve deficit. Coordination normal.   Skin: Skin is warm and dry. She is not diaphoretic.   Psychiatric: She has a normal mood and affect. Her behavior is normal.       Significant Labs:   Recent Lab Results       04/16/19  1159   04/16/19  1151   04/16/19  0827   04/16/19  0623   04/16/19  0620        Unit Blood Type Code               Unit Expiration               Unit Blood Type               Albumin   1.7           Alkaline Phosphatase               ALT               Anion Gap   11           aPTT   44.4  Comment:  aPTT therapeutic range = 39-69 seconds     45.7  Comment:  aPTT therapeutic range = 39-69 seconds              45.7  Comment:  aPTT therapeutic range = 39-69 seconds     AST               Baso #               Basophil%               Bilirubin, Direct               BILIRUBIN TOTAL               BUN, Bld   8           Calcium   9.5           Chloride   102           CO2   23           CODING SYSTEM               Creatinine   2.3           Differential Method               DISPENSE STATUS               eGFR if    27.9           eGFR if non    24.2  Comment:  Calculation used to obtain the estimated glomerular filtration  rate (eGFR) is the CKD-EPI equation.              Eos #               Eosinophil%               Glucose   157           Gran # (ANC)                Gran%               Group & Rh               Hematocrit               Hemoglobin               Immature Grans (Abs)               Immature Granulocytes               INDIRECT KYLEIGH               Lymph #               Lymph%               Magnesium   1.7           MCH               MCHC               MCV               Mono #               Mono%               MPV               nRBC               Phosphorus   3.4           Platelets               POCT Glucose 183   147 149       Potassium   4.6           PRODUCT CODE               PROTEIN TOTAL               RBC               RDW               Sodium   136           UNIT NUMBER               Vancomycin, Random               WBC                                04/16/19  0529   04/15/19  2315   04/15/19  2311   04/15/19  2132   04/15/19  2130        Unit Blood Type Code     7300[P]         Unit Expiration     [id number][P]         Unit Blood Type     B POS[P]         Albumin 1.7       1.7      1.7             Alkaline Phosphatase 121                          Anion Gap 10       9     aPTT   33.0  Comment:  aPTT therapeutic range = 39-69 seconds           AST 61             Baso # 0.03       0.03     Basophil% 0.3       0.3     Bilirubin, Direct 0.4             BILIRUBIN TOTAL 0.6  Comment:  For infants and newborns, interpretation of results should be based  on gestational age, weight and in agreement with clinical  observations.  Premature Infant recommended reference ranges:  Up to 24 hours.............<8.0 mg/dL  Up to 48 hours............<12.0 mg/dL  3-5 days..................<15.0 mg/dL  6-29 days.................<15.0 mg/dL               BUN, Bld 9       17     Calcium 9.1       9.3     Chloride 102       102     CO2 25       25     CODING SYSTEM     [id number][P]         Creatinine 2.3       3.7     Differential Method Automated       Automated     DISPENSE STATUS     ISSUED[P]         eGFR if  27.9       15.7     eGFR if non   American 24.2  Comment:  Calculation used to obtain the estimated glomerular filtration  rate (eGFR) is the CKD-EPI equation.          13.6  Comment:  Calculation used to obtain the estimated glomerular filtration  rate (eGFR) is the CKD-EPI equation.        Eos # 0.2       0.2     Eosinophil% 2.7       1.9     Glucose 141       149     Gran # (ANC) 5.4       5.8     Gran% 61.4       65.5     Group & Rh     B POS         Hematocrit 25.2       22.0     Hemoglobin 7.8       6.8     Immature Grans (Abs) 0.03  Comment:  Mild elevation in immature granulocytes is non specific and   can be seen in a variety of conditions including stress response,   acute inflammation, trauma and pregnancy. Correlation with other   laboratory and clinical findings is essential.         0.04  Comment:  Mild elevation in immature granulocytes is non specific and   can be seen in a variety of conditions including stress response,   acute inflammation, trauma and pregnancy. Correlation with other   laboratory and clinical findings is essential.       Immature Granulocytes 0.3       0.5     INDIRECT KYLEIGH     NEG         Lymph # 2.6       2.3     Lymph% 29.2       26.3     Magnesium 1.8       1.8     MCH 29.3       30.4     MCHC 31.0       30.9     MCV 95       98     Mono # 0.5       0.5     Mono% 6.1       5.5     MPV 10.6       10.6     nRBC 2       2     Phosphorus 3.0       4.2     Platelets 253       270     POCT Glucose       163       Potassium 4.6       4.7     PRODUCT CODE     S3805H53[P]         PROTEIN TOTAL 6.8             RBC 2.66       2.24     RDW 19.8       17.0     Sodium 137       136     UNIT NUMBER     [id number][P]         Vancomycin, Random 14.2             WBC 8.73       8.83

## 2019-04-16 NOTE — ASSESSMENT & PLAN NOTE
ESRD on iHD TTS  [facility]  L femoral tunneled catheter (3/28)    47 yo female well known to our service who has been dialysis dependent for >15 years, who presents from SNF for hypotension and fever after hemodialysis on Thursday.  Nephrology has been consulted for ESRD management while in-patient.    HDS off pressor support.  IABP in place.    Plan/recommendations:  -switch to SLED today  -UF goal 350-400 cc/hr, goal to be net negative   -strict I/O's  -will closely monitor.

## 2019-04-17 NOTE — PLAN OF CARE
Problem: Adult Inpatient Plan of Care  Goal: Plan of Care Review  Outcome: Ongoing (interventions implemented as appropriate)  Plan of care updated and reviewed with pt and pt's family at bedside. Pt still receiving support with IABP at 1:1 frequency; ECG triggered. PRBCs x1 given per MD order. Pulses palpable. L fem dialysis catheter remains clotted; cath flow per MD order. Plan for CRRT tonight pending line patency. Reinforced importance of frequent positioning; pt verbalizes understanding. See flow sheet for full documentation.

## 2019-04-17 NOTE — ASSESSMENT & PLAN NOTE
Acute on chronic anemia.   Chronic likely 2/2 ESRD, on Epo as outpatient.   Blood lost during dialysis due to open valve.   Type and Screen sent  Repeat hemoglobin unchanged but became hypotensive so 1 unit PRBCs ordered.

## 2019-04-17 NOTE — ASSESSMENT & PLAN NOTE
- Improving  - Cultures NGTD, resent.   - Afebrile  -Cont antibiotics for sepsis of unknown source  - Lactates WNL  No obvious source of infection, ?intra-abdominal.

## 2019-04-17 NOTE — SUBJECTIVE & OBJECTIVE
Interval History: Attempting to pull balloon pump today. Will need device closure due to anatomy.     No complaints. Specifically no chest pain or shortness of breath.     On dialysis today lost significant amount of blood due to equipment malfunction. No symptoms of anemia reported. No drop in Hgb/Hct (rechecking later). Hemodynamically stable. T and S sent.     Review of Systems   Constitution: Negative for chills, decreased appetite and diaphoresis.   HENT: Negative for congestion and ear discharge.    Eyes: Negative for blurred vision and discharge.   Cardiovascular: Negative for chest pain, dyspnea on exertion, irregular heartbeat, leg swelling and paroxysmal nocturnal dyspnea.   Respiratory: Negative for cough, hemoptysis and shortness of breath.    Gastrointestinal: Negative for abdominal pain.     Objective:     Vital Signs (Most Recent):  Temp: 99 °F (37.2 °C) (04/17/19 1100)  Pulse: 102 (04/17/19 1434)  Resp: (!) 30 (04/17/19 1434)  BP: (!) 166/80 (04/16/19 2310)  SpO2: 98 % (04/17/19 1434) Vital Signs (24h Range):  Temp:  [97.7 °F (36.5 °C)-99.1 °F (37.3 °C)] 99 °F (37.2 °C)  Pulse:  [] 102  Resp:  [10-32] 30  SpO2:  [88 %-100 %] 98 %  BP: (166)/(80) 166/80     Weight: 97.1 kg (214 lb)  Body mass index is 33.52 kg/m².     SpO2: 98 %  O2 Device (Oxygen Therapy): room air      Intake/Output Summary (Last 24 hours) at 4/17/2019 1444  Last data filed at 4/17/2019 1425  Gross per 24 hour   Intake 3920.98 ml   Output 5528 ml   Net -1607.02 ml       Lines/Drains/Airways     Central Venous Catheter Line                 Hemodialysis Catheter 03/28/19 1528 left femoral 19 days          Drain                 Hemodialysis AV Fistula Left upper arm -- days         Hemodialysis AV Graft Right upper arm -- days          Peripheral Intravenous Line                 Peripheral IV - Single Lumen 04/17/19 1000 20 G;1 3/4 in Left Upper Arm less than 1 day                Physical Exam   Constitutional: She is oriented to  person, place, and time. She appears well-developed and well-nourished. No distress.   HENT:   Mouth/Throat: Oropharynx is clear and moist.   Eyes: Conjunctivae are normal. No scleral icterus.   Neck: No tracheal deviation present. No thyromegaly present.   Cardiovascular: Normal rate, regular rhythm, normal heart sounds and intact distal pulses. Exam reveals no gallop and no friction rub.   No murmur heard.  IABP R CFA  Dopplerable DP bilaterally  Tunneled dialysis catheter left groin.    Pulmonary/Chest: Effort normal and breath sounds normal. No respiratory distress. She has no wheezes. She has no rales.   Abdominal: Soft. Bowel sounds are normal. She exhibits distension. There is no tenderness.   Musculoskeletal: She exhibits no edema or deformity.   Neurological: She is alert and oriented to person, place, and time. No cranial nerve deficit. Coordination normal.   Skin: Skin is warm and dry. She is not diaphoretic.   Psychiatric: She has a normal mood and affect. Her behavior is normal.   Nursing note and vitals reviewed.      Significant Labs:   Recent Lab Results       04/17/19  1117   04/17/19  1115   04/17/19  1109   04/17/19  0927   04/17/19  0745        Albumin               Alkaline Phosphatase               ALT               Anion Gap               aPTT               AST               Baso # 0.02             Basophil% 0.2             Bilirubin, Direct               BILIRUBIN TOTAL               BUN, Bld               Calcium               Chloride               CO2               Creatinine               Differential Method Automated             eGFR if                eGFR if non                Eos # 0.2             Eosinophil% 1.8             Glucose               Gran # (ANC) 5.5             Gran% 59.0             Group & Rh   B POS           Hematocrit 25.4             Hemoglobin 8.0             Immature Grans (Abs) 0.09  Comment:  Mild elevation in immature  granulocytes is non specific and   can be seen in a variety of conditions including stress response,   acute inflammation, trauma and pregnancy. Correlation with other   laboratory and clinical findings is essential.               Immature Granulocytes 1.0             INDIRECT KYLEIGH   NEG           Lymph # 2.7             Lymph% 29.6             Magnesium               MCH 30.0             MCHC 31.5             MCV 95             Mono # 0.8             Mono% 8.4             MPV 10.6             nRBC 1             Phosphorus               Platelets 212             POC ACTIVATED CLOTTING TIME K       125       POCT Glucose     153   163     Potassium               PROTEIN TOTAL               RBC 2.67             RDW 19.0             Sample       unknown       Sodium               Vancomycin, Random               WBC 9.25                              04/17/19  0416   04/16/19  2246   04/16/19  2230        Albumin 1.7   1.7      1.7         Alkaline Phosphatase 97                  Anion Gap 10   9     aPTT 52.0  Comment:  aPTT therapeutic range = 39-69 seconds         AST 30         Baso # 0.04         Basophil% 0.4         Bilirubin, Direct 0.3         BILIRUBIN TOTAL 0.5  Comment:  For infants and newborns, interpretation of results should be based  on gestational age, weight and in agreement with clinical  observations.  Premature Infant recommended reference ranges:  Up to 24 hours.............<8.0 mg/dL  Up to 48 hours............<12.0 mg/dL  3-5 days..................<15.0 mg/dL  6-29 days.................<15.0 mg/dL           BUN, Bld 12   11     Calcium 10.3   10.0     Chloride 104   103     CO2 22   24     Creatinine 3.4   3.1     Differential Method Automated         eGFR if  17.4   19.5     eGFR if non  15.1  Comment:  Calculation used to obtain the estimated glomerular filtration  rate (eGFR) is the CKD-EPI equation.      16.9  Comment:  Calculation used to obtain the  estimated glomerular filtration  rate (eGFR) is the CKD-EPI equation.        Eos # 0.3         Eosinophil% 3.1         Glucose 142   144     Gran # (ANC) 4.8         Gran% 53.2         Group & Rh           Hematocrit 24.9         Hemoglobin 7.7         Immature Grans (Abs) 0.04  Comment:  Mild elevation in immature granulocytes is non specific and   can be seen in a variety of conditions including stress response,   acute inflammation, trauma and pregnancy. Correlation with other   laboratory and clinical findings is essential.           Immature Granulocytes 0.4         INDIRECT KYLEIGH           Lymph # 3.1         Lymph% 34.0         Magnesium 2.4   1.9     MCH 29.8         MCHC 30.9         MCV 97         Mono # 0.8         Mono% 8.9         MPV 10.6         nRBC 1         Phosphorus 4.7   4.4     Platelets 250         POC ACTIVATED CLOTTING TIME K           POCT Glucose   166       Potassium 4.7   4.6     PROTEIN TOTAL 6.8         RBC 2.58         RDW 19.3         Sample           Sodium 136   136     Vancomycin, Random 25.8         WBC 9.03

## 2019-04-17 NOTE — SUBJECTIVE & OBJECTIVE
Interval History:   SCUF overnight, net negative 600 ml/24h.  Electrolytes stable.  Plans for IABP removal today.    Review of patient's allergies indicates:   Allergen Reactions    Clindamycin Diarrhea    Flagyl [metronidazole hcl]     Metronidazole      Current Facility-Administered Medications   Medication Frequency    0.9%  NaCl infusion (CRRT USE ONLY) Continuous    0.9%  NaCl infusion (for blood administration) Q24H PRN    0.9%  NaCl infusion (for blood administration) Q24H PRN    albuterol-ipratropium 2.5 mg-0.5 mg/3 mL nebulizer solution 3 mL Q4H PRN    aspirin EC tablet 81 mg Daily    atorvastatin tablet 40 mg QHS    benzonatate capsule 100 mg TID PRN    calcitRIOL capsule 0.5 mcg Daily    clopidogrel tablet 75 mg Daily    dextrose 50% injection 12.5 g PRN    dextrose 50% injection 25 g PRN    glucagon (human recombinant) injection 1 mg PRN    glucose chewable tablet 16 g PRN    glucose chewable tablet 24 g PRN    heparin infusion 1,000 units/500 ml in 0.9% NaCl (pressure line flush) Continuous PRN    insulin aspart U-100 pen 0-5 Units QID (AC + HS) PRN    lisinopril tablet 2.5 mg Daily    loperamide capsule 2 mg TID PRN    magnesium sulfate 2g in water 50mL IVPB (premix) PRN    metoprolol succinate (TOPROL-XL) 24 hr tablet 25 mg Daily    pantoprazole EC tablet 40 mg Daily    piperacillin-tazobactam 4.5 g in sodium chloride 0.9% 100 mL IVPB (ready to mix system) Q12H    sodium chloride 0.9% flush 10 mL PRN       Objective:     Vital Signs (Most Recent):  Temp: 97.7 °F (36.5 °C) (04/17/19 0700)  Pulse: 98 (04/17/19 0927)  Resp: (!) 27 (04/17/19 0927)  BP: (!) 166/80 (04/16/19 2310)  SpO2: 100 % (04/17/19 0927)  O2 Device (Oxygen Therapy): room air (04/17/19 0925) Vital Signs (24h Range):  Temp:  [97.7 °F (36.5 °C)-99.1 °F (37.3 °C)] 97.7 °F (36.5 °C)  Pulse:  [] 98  Resp:  [10-32] 27  SpO2:  [88 %-100 %] 100 %  BP: (166)/(80) 166/80     Weight: 97.1 kg (214 lb) (04/14/19  1815)  Body mass index is 33.52 kg/m².  Body surface area is 2.14 meters squared.    I/O last 3 completed shifts:  In: 7315.4 [P.O.:800; I.V.:5440; Blood:753.3; IV Piggyback:322.1]  Out: 8095 [Other:8095]    Physical Exam   Constitutional: She is oriented to person, place, and time. She appears well-developed and well-nourished. No distress.   HENT:   Head: Normocephalic and atraumatic.   Right Ear: External ear normal.   Left Ear: External ear normal.   Eyes: Conjunctivae and EOM are normal. Right eye exhibits no discharge. Left eye exhibits no discharge.   Neck: Normal range of motion. Neck supple.   Cardiovascular: Normal rate and regular rhythm. Exam reveals no gallop and no friction rub.   No murmur heard.  Pulmonary/Chest: Effort normal and breath sounds normal. No respiratory distress. She has no wheezes. She has no rales.   Abdominal: Soft. Bowel sounds are normal. She exhibits no distension. There is no tenderness.   Musculoskeletal: Normal range of motion. She exhibits no edema or deformity.   Neurological: She is alert and oriented to person, place, and time.   Skin: Skin is warm and dry. She is not diaphoretic.   L femoral tunneled dialysis catheter   Psychiatric: She has a normal mood and affect. Her behavior is normal.       Significant Labs:  CBC:   Recent Labs   Lab 04/17/19  1117   WBC 9.25   RBC 2.67*   HGB 8.0*   HCT 25.4*      MCV 95   MCH 30.0   MCHC 31.5*     CMP:   Recent Labs   Lab 04/17/19  0416   *   CALCIUM 10.3   ALBUMIN 1.7*  1.7*   PROT 6.8      K 4.7   CO2 22*      BUN 12   CREATININE 3.4*   ALKPHOS 97   *   AST 30   BILITOT 0.5

## 2019-04-17 NOTE — PROGRESS NOTES
Called to bedside for clotting dialysis machine.  Unable to return venous blood due to dialyzer clotted.  Unable to aspirate from venous lumen of catheter.  Cathflo to be instilled and tx resumed if catheter functions properly.

## 2019-04-17 NOTE — PROGRESS NOTES
04/17/2019  Carl Pruett    Current provider:  Dionne Bullock MD      I, Carl Pruett, rounded on Jenni Todd to ensure all mechanical assist device settings (IABP or VAD) were appropriate and all parameters were within limits.  I was able to ensure all back up equipment was present, the staff had no issues, and the Perfusion Department daily rounding was complete.    9:21 AM

## 2019-04-17 NOTE — PROGRESS NOTES
[facility]  Cardiology  Progress Note    Patient Name: Jenni Todd  MRN: [id number]  Admission Date: 4/12/2019  Hospital Length of Stay: 5 days  Code Status: Full Code   Attending Physician: Dionne Bullock MD   Primary Care Physician: Michael Tan Iii, MD  Expected Discharge Date:   Principal Problem:Cardiogenic shock    Subjective:       Interval History: Attempting to pull balloon pump today. Will need device closure due to anatomy.     No complaints. Specifically no chest pain or shortness of breath.     On dialysis today lost significant amount of blood due to equipment malfunction. No symptoms of anemia reported. No drop in Hgb/Hct (rechecking later). Hemodynamically stable. T and S sent.     Review of Systems   Constitution: Negative for chills, decreased appetite and diaphoresis.   HENT: Negative for congestion and ear discharge.    Eyes: Negative for blurred vision and discharge.   Cardiovascular: Negative for chest pain, dyspnea on exertion, irregular heartbeat, leg swelling and paroxysmal nocturnal dyspnea.   Respiratory: Negative for cough, hemoptysis and shortness of breath.    Gastrointestinal: Negative for abdominal pain.     Objective:     Vital Signs (Most Recent):  Temp: 99 °F (37.2 °C) (04/17/19 1100)  Pulse: 102 (04/17/19 1434)  Resp: (!) 30 (04/17/19 1434)  BP: (!) 166/80 (04/16/19 2310)  SpO2: 98 % (04/17/19 1434) Vital Signs (24h Range):  Temp:  [97.7 °F (36.5 °C)-99.1 °F (37.3 °C)] 99 °F (37.2 °C)  Pulse:  [] 102  Resp:  [10-32] 30  SpO2:  [88 %-100 %] 98 %  BP: (166)/(80) 166/80     Weight: 97.1 kg (214 lb)  Body mass index is 33.52 kg/m².     SpO2: 98 %  O2 Device (Oxygen Therapy): room air      Intake/Output Summary (Last 24 hours) at 4/17/2019 1444  Last data filed at 4/17/2019 1425  Gross per 24 hour   Intake 3920.98 ml   Output 5528 ml   Net -1607.02 ml       Lines/Drains/Airways     Central Venous Catheter Line                 Hemodialysis Catheter  03/28/19 1528 left femoral 19 days          Drain                 Hemodialysis AV Fistula Left upper arm -- days         Hemodialysis AV Graft Right upper arm -- days          Peripheral Intravenous Line                 Peripheral IV - Single Lumen 04/17/19 1000 20 G;1 3/4 in Left Upper Arm less than 1 day                Physical Exam   Constitutional: She is oriented to person, place, and time. She appears well-developed and well-nourished. No distress.   HENT:   Mouth/Throat: Oropharynx is clear and moist.   Eyes: Conjunctivae are normal. No scleral icterus.   Neck: No tracheal deviation present. No thyromegaly present.   Cardiovascular: Normal rate, regular rhythm, normal heart sounds and intact distal pulses. Exam reveals no gallop and no friction rub.   No murmur heard.  IABP R CFA  Dopplerable DP bilaterally  Tunneled dialysis catheter left groin.    Pulmonary/Chest: Effort normal and breath sounds normal. No respiratory distress. She has no wheezes. She has no rales.   Abdominal: Soft. Bowel sounds are normal. She exhibits distension. There is no tenderness.   Musculoskeletal: She exhibits no edema or deformity.   Neurological: She is alert and oriented to person, place, and time. No cranial nerve deficit. Coordination normal.   Skin: Skin is warm and dry. She is not diaphoretic.   Psychiatric: She has a normal mood and affect. Her behavior is normal.   Nursing note and vitals reviewed.      Significant Labs:   Recent Lab Results       04/17/19  1117   04/17/19  1115   04/17/19  1109   04/17/19  0927   04/17/19  0745        Albumin               Alkaline Phosphatase               ALT               Anion Gap               aPTT               AST               Baso # 0.02             Basophil% 0.2             Bilirubin, Direct               BILIRUBIN TOTAL               BUN, Bld               Calcium               Chloride               CO2               Creatinine               Differential Method Automated              eGFR if                eGFR if non                Eos # 0.2             Eosinophil% 1.8             Glucose               Gran # (ANC) 5.5             Gran% 59.0             Group & Rh   B POS           Hematocrit 25.4             Hemoglobin 8.0             Immature Grans (Abs) 0.09  Comment:  Mild elevation in immature granulocytes is non specific and   can be seen in a variety of conditions including stress response,   acute inflammation, trauma and pregnancy. Correlation with other   laboratory and clinical findings is essential.               Immature Granulocytes 1.0             INDIRECT KYLEIGH   NEG           Lymph # 2.7             Lymph% 29.6             Magnesium               MCH 30.0             MCHC 31.5             MCV 95             Mono # 0.8             Mono% 8.4             MPV 10.6             nRBC 1             Phosphorus               Platelets 212             POC ACTIVATED CLOTTING TIME K       125       POCT Glucose     153   163     Potassium               PROTEIN TOTAL               RBC 2.67             RDW 19.0             Sample       unknown       Sodium               Vancomycin, Random               WBC 9.25                              04/17/19  0416   04/16/19  2246   04/16/19  2230        Albumin 1.7   1.7      1.7         Alkaline Phosphatase 97                  Anion Gap 10   9     aPTT 52.0  Comment:  aPTT therapeutic range = 39-69 seconds         AST 30         Baso # 0.04         Basophil% 0.4         Bilirubin, Direct 0.3         BILIRUBIN TOTAL 0.5  Comment:  For infants and newborns, interpretation of results should be based  on gestational age, weight and in agreement with clinical  observations.  Premature Infant recommended reference ranges:  Up to 24 hours.............<8.0 mg/dL  Up to 48 hours............<12.0 mg/dL  3-5 days..................<15.0 mg/dL  6-29 days.................<15.0 mg/dL           BUN, Bld 12   11      Calcium 10.3   10.0     Chloride 104   103     CO2 22   24     Creatinine 3.4   3.1     Differential Method Automated         eGFR if  17.4   19.5     eGFR if non  15.1  Comment:  Calculation used to obtain the estimated glomerular filtration  rate (eGFR) is the CKD-EPI equation.      16.9  Comment:  Calculation used to obtain the estimated glomerular filtration  rate (eGFR) is the CKD-EPI equation.        Eos # 0.3         Eosinophil% 3.1         Glucose 142   144     Gran # (ANC) 4.8         Gran% 53.2         Group & Rh           Hematocrit 24.9         Hemoglobin 7.7         Immature Grans (Abs) 0.04  Comment:  Mild elevation in immature granulocytes is non specific and   can be seen in a variety of conditions including stress response,   acute inflammation, trauma and pregnancy. Correlation with other   laboratory and clinical findings is essential.           Immature Granulocytes 0.4         INDIRECT KYLEIGH           Lymph # 3.1         Lymph% 34.0         Magnesium 2.4   1.9     MCH 29.8         MCHC 30.9         MCV 97         Mono # 0.8         Mono% 8.9         MPV 10.6         nRBC 1         Phosphorus 4.7   4.4     Platelets 250         POC ACTIVATED CLOTTING TIME K           POCT Glucose   166       Potassium 4.7   4.6     PROTEIN TOTAL 6.8         RBC 2.58         RDW 19.3         Sample           Sodium 136   136     Vancomycin, Random 25.8         WBC 9.03                 Assessment and Plan:     Brief HPI: 48 F with ESRD on HD through tunneled femoral line presented with hypotension suspected 2/2 septic shock, found to have NSTEMI and 3 vessel disease on angiogram. Now with balloon pump in place off pressors.       * Cardiogenic shock  NSTEMI    Troponemia with atypical chest pain. S/p angiogram showing multi-vessel disease, now with balloon pump support in place.   - Cont IABP 1:1, will wean tomorrow after acute blood loss corrected  - Vol status improving, dialysis  per nephrology. Does not appear volume overloaded at this time.     Pressure injury of sacral region, stage 2  Wound care consult    Septic shock  - Improving  - Cultures NGTD, resent.   - Afebrile  -Cont antibiotics for sepsis of unknown source  - Lactates WNL  No obvious source of infection, ?intra-abdominal.       NSTEMI (non-ST elevated myocardial infarction)  -c/w ACS protocol (DAPT- ASA/Plavix and heparin drip)   -NSTEMI with troponin up to 16, [facility] with 3 vessel disease 99% LAD, 80%LCX, 100% RCA, LVEDP in 30s, hypotensive, IABP placed. CT surgery consulted for CABG Eval, not a candidate due to comorbidities.   -May get viability study      Acute on chronic systolic heart failure  -LV EF of 25%, GDMT initiated with small doses of Acei and B Blocker  Still with mechanical support (balloon pump) in place. When trying to wean today, blood pressure dropped so left in place.     Acute blood loss anemia  Acute on chronic anemia.   Chronic likely 2/2 ESRD, on Epo as outpatient.   Blood lost during dialysis due to open valve.   Type and Screen sent  Repeat hemoglobin unchanged but became hypotensive so 1 unit PRBCs ordered.     ESRD (end stage renal disease) on dialysis  - Cont dialysis via L Femoral tunneled dialysis line    Anemia in chronic kidney disease, on chronic dialysis  Epo per nephrology  1 unit Hgb transfused this admission. Giving another unit today (4/17)        VTE Risk Mitigation (From admission, onward)        Ordered     heparin infusion 1,000 units/500 ml in 0.9% NaCl (pressure line flush)  Intra-op continuous PRN      04/15/19 1556     IP VTE HIGH RISK PATIENT  Once      04/12/19 0440          Jorge A Gomes MD   Internal Medicine PGY-2  [phone]

## 2019-04-17 NOTE — ASSESSMENT & PLAN NOTE
NSTEMI    Troponemia with atypical chest pain. S/p angiogram showing multi-vessel disease, now with balloon pump support in place.   - Cont IABP 1:1, will wean tomorrow after acute blood loss corrected  - Vol status improving, dialysis per nephrology. Does not appear volume overloaded at this time.

## 2019-04-17 NOTE — ASSESSMENT & PLAN NOTE
-LV EF of 25%, GDMT initiated with small doses of Acei and B Blocker  Still with mechanical support (balloon pump) in place. When trying to wean today, blood pressure dropped so left in place.

## 2019-04-17 NOTE — PROGRESS NOTES
Pharmacokinetic Assessment Follow Up: IV Vancomycin    Vancomycin serum concentration assessment(s):    The random level was drawned correctly and can be used to guide therapy at this time.    Vancomycin Regimen Plan:    Re-dose when the random level is less than 20 mcg/mL, next level to be drawn at am labs on 4/18    Pharmacy will continue to follow and monitor vancomycin.    Please contact pharmacy at extension [phone] for questions regarding this assessment.    Thank you for the consult,   Sheree Workman     Patient brief summary:  Jenni Todd is a 48 y.o. female initiated on antimicrobial therapy with IV Vancomycin for treatment of suspected sepsis    The patient received a loading dose, followed by the current treatment regimen: random levels with plan to redose when level is less than 20 mcg/mL    Drug Allergies:   Review of patient's allergies indicates:   Allergen Reactions    Clindamycin Diarrhea    Flagyl [metronidazole hcl]     Metronidazole        Actual Body Weight:   97.1 kg    Renal Function:   Estimated Creatinine Clearance: 24 mL/min (A) (based on SCr of 3.4 mg/dL (H)).,     Dialysis Method (if applicable):  SLED    CBC (last 72 hours):  Recent Labs   Lab Result Units 04/15/19  0343 04/15/19  2130 04/16/19  0529 04/17/19  0416   WBC K/uL 12.39 8.83 8.73 9.03   Hemoglobin g/dL 7.1* 6.8* 7.8* 7.7*   Hematocrit % 21.9* 22.0* 25.2* 24.9*   Platelets K/uL 263 270 253 250   Gran% % 58.6 65.5 61.4 53.2   Lymph% % 33.5 26.3 29.2 34.0   Mono% % 4.9 5.5 6.1 8.9   Eosinophil% % 1.0 1.9 2.7 3.1   Basophil% % 0.5 0.3 0.3 0.4   Differential Method  Automated Automated Automated Automated       Metabolic Panel (last 72 hours):  Recent Labs   Lab Result Units 04/14/19  2248 04/15/19  0343 04/15/19  1430 04/15/19  1800 04/15/19  2130 04/16/19  0529 04/16/19  1151 04/16/19  2230 04/17/19  0416   Sodium mmol/L 135* 136 136 136 136 137 136 136 136   Potassium mmol/L 4.8 4.8 4.9 5.0 4.7 4.6 4.6 4.6 4.7    Chloride mmol/L 100 100 100 100 102 102 102 103 104   CO2 mmol/L 23 24 25 24 25 25 23 24 22*   Glucose mg/dL 184* 159* 136* 121* 149* 141* 157* 144* 142*   BUN, Bld mg/dL 24* 25* 26* 27* 17 9 8 11 12   Creatinine mg/dL 4.9* 5.2* 5.5* 5.8* 3.7* 2.3* 2.3* 3.1* 3.4*   Albumin g/dL 1.6* 1.7*  1.7* 1.6* 1.7* 1.7* 1.7*  1.7* 1.7* 1.7* 1.7*  1.7*   Total Bilirubin mg/dL  --  0.3  --  0.4  --  0.6  --   --  0.5   Alkaline Phosphatase U/L  --  112  --  99  --  121  --   --  97   AST U/L  --  143*  --  86*  --  61*  --   --  30   ALT U/L  --  348*  --  274*  --  237*  --   --  153*   Magnesium mg/dL 1.6 2.0 1.9  --  1.8 1.8 1.7 1.9 2.4   Phosphorus mg/dL 5.4* 5.6* 6.2*  --  4.2 3.0 3.4 4.4 4.7*       Vancomycin Administrations:  vancomycin given in the last 96 hours                   vancomycin in dextrose 5 % 1 gram/250 mL IVPB 1,000 mg (mg) 1,000 mg New Bag 04/16/19 1115                Drug levels (last 3 results):  Recent Labs   Lab Result Units 04/15/19  0343 04/16/19  0529 04/17/19  0416   Vancomycin, Random ug/mL 25.7 14.2 25.8       Microbiologic Results:  Microbiology Results (last 7 days)     Procedure Component Value Units Date/Time    Blood culture #1 **CANNOT BE ORDERED STAT** [[id number]] Collected:  04/12/19 0204    Order Status:  Completed Specimen:  Blood from Peripheral, Wrist, Left Updated:  04/17/19 0812     Blood Culture, Routine No growth after 5 days.    Blood culture #2 **CANNOT BE ORDERED STAT** [[id number]] Collected:  04/12/19 0222    Order Status:  Completed Specimen:  Blood from Peripheral, Hand, Left Updated:  04/17/19 0812     Blood Culture, Routine No growth after 5 days.

## 2019-04-17 NOTE — ASSESSMENT & PLAN NOTE
-c/w ACS protocol (DAPT- ASA/Plavix and heparin drip)   -NSTEMI with troponin up to 16, LHC with 3 vessel disease 99% LAD, 80%LCX, 100% RCA, LVEDP in 30s, hypotensive, IABP placed. CT surgery consulted for CABG Eval, not a candidate due to comorbidities.   -May get viability study

## 2019-04-17 NOTE — PROGRESS NOTES
[facility]  Nephrology  Progress Note    Patient Name: Jenni Todd  MRN: [id number]  Admission Date: 4/12/2019  Hospital Length of Stay: 5 days  Attending Provider: Dionne Bullock MD   Primary Care Physician: Michael Tan Iii, MD  Principal Problem:Cardiogenic shock    Subjective:     Interval History:   SCUF overnight, net negative 600 ml/24h.  Electrolytes stable.  Plans for IABP removal today.    Review of patient's allergies indicates:   Allergen Reactions    Clindamycin Diarrhea    Flagyl [metronidazole hcl]     Metronidazole      Current Facility-Administered Medications   Medication Frequency    0.9%  NaCl infusion (CRRT USE ONLY) Continuous    0.9%  NaCl infusion (for blood administration) Q24H PRN    0.9%  NaCl infusion (for blood administration) Q24H PRN    albuterol-ipratropium 2.5 mg-0.5 mg/3 mL nebulizer solution 3 mL Q4H PRN    aspirin EC tablet 81 mg Daily    atorvastatin tablet 40 mg QHS    benzonatate capsule 100 mg TID PRN    calcitRIOL capsule 0.5 mcg Daily    clopidogrel tablet 75 mg Daily    dextrose 50% injection 12.5 g PRN    dextrose 50% injection 25 g PRN    glucagon (human recombinant) injection 1 mg PRN    glucose chewable tablet 16 g PRN    glucose chewable tablet 24 g PRN    heparin infusion 1,000 units/500 ml in 0.9% NaCl (pressure line flush) Continuous PRN    insulin aspart U-100 pen 0-5 Units QID (AC + HS) PRN    lisinopril tablet 2.5 mg Daily    loperamide capsule 2 mg TID PRN    magnesium sulfate 2g in water 50mL IVPB (premix) PRN    metoprolol succinate (TOPROL-XL) 24 hr tablet 25 mg Daily    pantoprazole EC tablet 40 mg Daily    piperacillin-tazobactam 4.5 g in sodium chloride 0.9% 100 mL IVPB (ready to mix system) Q12H    sodium chloride 0.9% flush 10 mL PRN       Objective:     Vital Signs (Most Recent):  Temp: 97.7 °F (36.5 °C) (04/17/19 0700)  Pulse: 98 (04/17/19 0927)  Resp: (!) 27 (04/17/19 0927)  BP: (!) 166/80  (04/16/19 2310)  SpO2: 100 % (04/17/19 0927)  O2 Device (Oxygen Therapy): room air (04/17/19 0925) Vital Signs (24h Range):  Temp:  [97.7 °F (36.5 °C)-99.1 °F (37.3 °C)] 97.7 °F (36.5 °C)  Pulse:  [] 98  Resp:  [10-32] 27  SpO2:  [88 %-100 %] 100 %  BP: (166)/(80) 166/80     Weight: 97.1 kg (214 lb) (04/14/19 1815)  Body mass index is 33.52 kg/m².  Body surface area is 2.14 meters squared.    I/O last 3 completed shifts:  In: 7315.4 [P.O.:800; I.V.:5440; Blood:753.3; IV Piggyback:322.1]  Out: 8095 [Other:8095]    Physical Exam   Constitutional: She is oriented to person, place, and time. She appears well-developed and well-nourished. No distress.   HENT:   Head: Normocephalic and atraumatic.   Right Ear: External ear normal.   Left Ear: External ear normal.   Eyes: Conjunctivae and EOM are normal. Right eye exhibits no discharge. Left eye exhibits no discharge.   Neck: Normal range of motion. Neck supple.   Cardiovascular: Normal rate and regular rhythm. Exam reveals no gallop and no friction rub.   No murmur heard.  Pulmonary/Chest: Effort normal and breath sounds normal. No respiratory distress. She has no wheezes. She has no rales.   Abdominal: Soft. Bowel sounds are normal. She exhibits no distension. There is no tenderness.   Musculoskeletal: Normal range of motion. She exhibits no edema or deformity.   Neurological: She is alert and oriented to person, place, and time.   Skin: Skin is warm and dry. She is not diaphoretic.   L femoral tunneled dialysis catheter   Psychiatric: She has a normal mood and affect. Her behavior is normal.       Significant Labs:  CBC:   Recent Labs   Lab 04/17/19  1117   WBC 9.25   RBC 2.67*   HGB 8.0*   HCT 25.4*      MCV 95   MCH 30.0   MCHC 31.5*     CMP:   Recent Labs   Lab 04/17/19  0416   *   CALCIUM 10.3   ALBUMIN 1.7*  1.7*   PROT 6.8      K 4.7   CO2 22*      BUN 12   CREATININE 3.4*   ALKPHOS 97   *   AST 30   BILITOT 0.5             Assessment/Plan:     ESRD (end stage renal disease) on dialysis  ESRD on iHD TTS  [facility]  L femoral tunneled catheter (3/28)    49 yo female well known to our service who has been dialysis dependent for >15 years, who presents from SNF for hypotension and fever after hemodialysis on Thursday.  Nephrology has been consulted for ESRD management while in-patient.    HDS off pressor support.  IABP in place.    Plan/recommendations:  -switch to SLED today  -UF goal 350-400 cc/hr, goal to be net negative   -strict I/O's  -will closely monitor.      Andrew Ragsdale NP  Nephrology  [facility]

## 2019-04-17 NOTE — PLAN OF CARE
Problem: Adult Inpatient Plan of Care  Goal: Plan of Care Review  Outcome: Ongoing (interventions implemented as appropriate)  Patient alert and oriented x 4.  No acute events overnight.      Respiratory - RA.  Placed on 2L NC when asleep due to drop in sat to 89%    Cardiovascular - NSR with BBB.  IABP to right groin - 1:1, EKG trigger, augmenting >110 all shift, no alarms overnight.     GI - BM x 1.  Immodium given twice per patient request.      - Anuric.  CRRT in place to left groin trialysis line.     Tessalon perles given x 2 for dry cough.      Bath and linen change completed.  Wound care completed per wound care orders.      No family @ bedside overnight but pt did update some family members on POC via the phone.     Problem: Device-Related Complication Risk (CRRT (Continuous Renal Replacement Therapy))  Goal: Safe, Effective Therapy Delivery  Outcome: Ongoing (interventions implemented as appropriate)  CRRT clotted once this shift.  SCUF.  .

## 2019-04-18 NOTE — SUBJECTIVE & OBJECTIVE
Interval History:   SLED overnight, net negative 903 ml/24h.  IABP removed this AM.  Scheduled for PET scan today.    Review of patient's allergies indicates:   Allergen Reactions    Clindamycin Diarrhea    Flagyl [metronidazole hcl]     Metronidazole      Current Facility-Administered Medications   Medication Frequency    0.9%  NaCl infusion (CRRT USE ONLY) Continuous    0.9%  NaCl infusion (for blood administration) Q24H PRN    0.9%  NaCl infusion (for blood administration) Q24H PRN    0.9%  NaCl infusion (for blood administration) Q24H PRN    albuterol-ipratropium 2.5 mg-0.5 mg/3 mL nebulizer solution 3 mL Q4H PRN    aspirin EC tablet 81 mg Daily    atorvastatin tablet 40 mg QHS    benzonatate capsule 100 mg TID PRN    calcitRIOL capsule 0.5 mcg Daily    clopidogrel tablet 75 mg Daily    dextrose 50% injection 12.5 g PRN    dextrose 50% injection 25 g PRN    glucagon (human recombinant) injection 1 mg PRN    glucose chewable tablet 16 g PRN    glucose chewable tablet 24 g PRN    heparin infusion 1,000 units/500 ml in 0.9% NaCl (pressure line flush) Continuous PRN    insulin aspart U-100 pen 0-5 Units QID (AC + HS) PRN    lidocaine (PF) 10 mg/ml (1%) 10 mg/mL (1 %) injection     lidocaine (PF) 10 mg/ml (1%) 10 mg/mL (1 %) injection     lisinopril tablet 2.5 mg Daily    loperamide capsule 2 mg TID PRN    magnesium sulfate 2g in water 50mL IVPB (premix) PRN    magnesium sulfate 2g in water 50mL IVPB (premix) PRN    metoprolol succinate (TOPROL-XL) 24 hr tablet 25 mg Daily    pantoprazole EC tablet 40 mg Daily    piperacillin-tazobactam 4.5 g in sodium chloride 0.9% 100 mL IVPB (ready to mix system) Q12H    sodium chloride 0.9% flush 10 mL PRN       Objective:     Vital Signs (Most Recent):  Temp: 98.7 °F (37.1 °C) (04/18/19 0701)  Pulse: (!) 120 (04/18/19 0900)  Resp: (!) 27 (04/18/19 0900)  BP: (!) 93/49 (04/18/19 0900)  SpO2: 98 % (04/18/19 0900)  O2 Device (Oxygen Therapy): room air  (04/18/19 0900) Vital Signs (24h Range):  Temp:  [98.2 °F (36.8 °C)-99.4 °F (37.4 °C)] 98.7 °F (37.1 °C)  Pulse:  [] 120  Resp:  [10-36] 27  SpO2:  [94 %-100 %] 98 %  BP: ()/(49-66) 93/49     Weight: 97.1 kg (214 lb) (04/14/19 1815)  Body mass index is 33.52 kg/m².  Body surface area is 2.14 meters squared.    I/O last 3 completed shifts:  In: 5231.9 [P.O.:390; I.V.:4100.9; Blood:350; IV Piggyback:391]  Out: 7336 [Other:7336]    Physical Exam   Constitutional: She is oriented to person, place, and time. She appears well-developed and well-nourished. No distress.   HENT:   Head: Normocephalic and atraumatic.   Right Ear: External ear normal.   Left Ear: External ear normal.   Eyes: Conjunctivae and EOM are normal. Right eye exhibits no discharge. Left eye exhibits no discharge.   Neck: Normal range of motion. Neck supple.   Cardiovascular: Normal rate and regular rhythm. Exam reveals no gallop and no friction rub.   No murmur heard.  Pulmonary/Chest: Effort normal and breath sounds normal. No respiratory distress. She has no wheezes. She has no rales.   Abdominal: Soft. Bowel sounds are normal. She exhibits no distension. There is no tenderness.   Musculoskeletal: Normal range of motion. She exhibits no edema or deformity.   Neurological: She is alert and oriented to person, place, and time.   Skin: Skin is warm and dry. She is not diaphoretic.   L femoral tunneled dialysis catheter   Psychiatric: She has a normal mood and affect. Her behavior is normal.       Significant Labs:  CBC:   Recent Labs   Lab 04/18/19  0500   WBC 7.33  7.33   RBC 2.94*  2.94*   HGB 8.6*  8.6*   HCT 26.7*  26.7*     192   MCV 91  91   MCH 29.3  29.3   MCHC 32.2  32.2     CMP:   Recent Labs   Lab 04/18/19  0500   *   CALCIUM 8.6*   ALBUMIN 1.8*  1.8*   PROT 6.2      K 4.4   CO2 25      BUN 6   CREATININE 1.8*   ALKPHOS 95   ALT 92*   AST 21   BILITOT 0.5

## 2019-04-18 NOTE — PROGRESS NOTES
[facility]  Cardiology  Progress Note    Patient Name: Jenni Todd  MRN: [id number]  Admission Date: 4/12/2019  Hospital Length of Stay: 6 days  Code Status: Full Code   Attending Physician: Dionne Bullock MD   Primary Care Physician: Michael Tan Iii, MD  Expected Discharge Date:   Principal Problem:Cardiogenic shock    Subjective:     Hospital Course:   No notes on file    Interval History:   IABP removed today and site closed with perclose. Post removal triphasic DP pulse on right foot.     Review of Systems   Constitution: Negative for chills, decreased appetite and diaphoresis.   HENT: Negative for congestion and ear discharge.    Eyes: Negative for blurred vision and discharge.   Cardiovascular: Negative for chest pain, dyspnea on exertion, irregular heartbeat, leg swelling and paroxysmal nocturnal dyspnea.   Respiratory: Negative for cough, hemoptysis and shortness of breath.    Gastrointestinal: Negative for abdominal pain.     Objective:     Vital Signs (Most Recent):  Temp: 98.7 °F (37.1 °C) (04/18/19 0701)  Pulse: 105 (04/18/19 1000)  Resp: 10 (04/18/19 1000)  BP: 131/87 (04/18/19 1000)  SpO2: 99 % (04/18/19 1000) Vital Signs (24h Range):  Temp:  [98.2 °F (36.8 °C)-99.4 °F (37.4 °C)] 98.7 °F (37.1 °C)  Pulse:  [] 105  Resp:  [10-33] 10  SpO2:  [94 %-100 %] 99 %  BP: ()/(49-87) 131/87     Weight: 97.1 kg (214 lb)  Body mass index is 33.52 kg/m².     SpO2: 99 %  O2 Device (Oxygen Therapy): room air      Intake/Output Summary (Last 24 hours) at 4/18/2019 1234  Last data filed at 4/18/2019 1000  Gross per 24 hour   Intake 2901.91 ml   Output 3705 ml   Net -803.09 ml       Lines/Drains/Airways     Central Venous Catheter Line                 Hemodialysis Catheter 03/28/19 1528 left femoral 20 days          Drain                 Hemodialysis AV Fistula Left upper arm -- days         Hemodialysis AV Graft Right upper arm -- days          Peripheral Intravenous Line                  Peripheral IV - Single Lumen 04/17/19 1000 20 G;1 3/4 in Left Upper Arm 1 day                Physical Exam   Constitutional: She is oriented to person, place, and time. She appears well-developed and well-nourished. No distress.   HENT:   Mouth/Throat: Oropharynx is clear and moist.   Eyes: Conjunctivae are normal. No scleral icterus.   Neck: No tracheal deviation present. No thyromegaly present.   Cardiovascular: Normal rate, regular rhythm, normal heart sounds and intact distal pulses. Exam reveals no gallop and no friction rub.   No murmur heard.  Pulmonary/Chest: Effort normal and breath sounds normal. No respiratory distress. She has no wheezes. She has no rales.   Abdominal: Soft. Bowel sounds are normal. She exhibits distension. There is no tenderness.   Musculoskeletal: She exhibits no edema or deformity.   Neurological: She is alert and oriented to person, place, and time. No cranial nerve deficit. Coordination normal.   Skin: Skin is warm and dry. She is not diaphoretic.   Psychiatric: She has a normal mood and affect. Her behavior is normal.   Nursing note and vitals reviewed.      Significant Labs:   Recent Lab Results       04/18/19  0854   04/18/19  0854   04/18/19  0654   04/18/19  0500   04/17/19  2354        Albumin       1.8              1.8       Alkaline Phosphatase       95       Allens Test               ALT       92       Anion Gap       9       aPTT     40.6  Comment:  aPTT therapeutic range = 39-69 seconds         AST       21       Baso #       0.02              0.02       Basophil%       0.3              0.3       Bilirubin, Direct       0.3       BILIRUBIN TOTAL       0.5  Comment:  For infants and newborns, interpretation of results should be based  on gestational age, weight and in agreement with clinical  observations.  Premature Infant recommended reference ranges:  Up to 24 hours.............<8.0 mg/dL  Up to 48 hours............<12.0 mg/dL  3-5 days..................<15.0  mg/dL  6-29 days.................<15.0 mg/dL         Site               BUN, Bld       6       Calcium       8.6       Chloride       107       CO2       25       Creatinine       1.8       Differential Method       Automated              Automated       eGFR if        37.6       eGFR if non        32.6  Comment:  Calculation used to obtain the estimated glomerular filtration  rate (eGFR) is the CKD-EPI equation.          Eos #       0.2              0.2       Eosinophil%       2.9              2.9       Glucose       126       Gran # (ANC)       3.8              3.8       Gran%       52.3              52.3       Hematocrit       26.7              26.7       Hemoglobin       8.6              8.6       Immature Grans (Abs)       0.04  Comment:  Mild elevation in immature granulocytes is non specific and   can be seen in a variety of conditions including stress response,   acute inflammation, trauma and pregnancy. Correlation with other   laboratory and clinical findings is essential.                0.04  Comment:  Mild elevation in immature granulocytes is non specific and   can be seen in a variety of conditions including stress response,   acute inflammation, trauma and pregnancy. Correlation with other   laboratory and clinical findings is essential.         Immature Granulocytes       0.5              0.5       Coumadin Monitoring INR               Lymph #       2.6              2.6       Lymph%       35.7              35.7       Magnesium       1.7       MCH       29.3              29.3       MCHC       32.2              32.2       MCV       91              91       Mono #       0.6              0.6       Mono%       8.3              8.3       MPV       10.6              10.6       nRBC       2              2       Phosphorus       2.4       Platelets       192              192       POC ACTIVATED CLOTTING TIME K 147             POC BE               POC HCO3               POC  Hematocrit               POC Ionized Calcium               POC PCO2               POC PH               POC PO2               POC Potassium               POC SATURATED O2               POC Sodium               POC TCO2               POCT Glucose   135     162     Potassium       4.4       PROTEIN TOTAL       6.2       Protime               RBC       2.94              2.94       RDW       20.0              20.0       Sample unknown             Sodium       141       Vancomycin, Random       16.2       WBC       7.33              7.33                        04/17/19  2350   04/17/19  1817   04/17/19  1705   04/17/19  1518   04/17/19  1434        Albumin 1.8     1.7       Alkaline Phosphatase               Allens Test         N/A     ALT               Anion Gap 8     8       aPTT 29.3  Comment:  aPTT therapeutic range = 39-69 seconds <21.0  Comment:  aPTT therapeutic range = 39-69 seconds           AST               Baso #   0.02           Basophil%   0.2           Bilirubin, Direct               BILIRUBIN TOTAL               Site         Other     BUN, Bld 10     11       Calcium 9.1     9.5       Chloride 106     107       CO2 23     22       Creatinine 2.8     3.1       Differential Method   Automated           eGFR if African American 22.0     19.5       eGFR if non  19.1  Comment:  Calculation used to obtain the estimated glomerular filtration  rate (eGFR) is the CKD-EPI equation.        16.9  Comment:  Calculation used to obtain the estimated glomerular filtration  rate (eGFR) is the CKD-EPI equation.          Eos #   0.2           Eosinophil%   2.2           Glucose 144     145       Gran # (ANC)   5.7           Gran%   59.5           Hematocrit   27.7   25.0       Hemoglobin   8.7   7.7       Immature Grans (Abs)   0.05  Comment:  Mild elevation in immature granulocytes is non specific and   can be seen in a variety of conditions including stress response,   acute inflammation, trauma and  pregnancy. Correlation with other   laboratory and clinical findings is essential.             Immature Granulocytes   0.5           Coumadin Monitoring INR   1.0  Comment:  Coumadin Therapy:  2.0 - 3.0 for INR for all indicators except mechanical heart valves  and antiphospholipid syndromes which should use 2.5 - 3.5.             Lymph #   2.8           Lymph%   29.9           Magnesium 1.8     2.1       MCH   29.0           MCHC   31.4           MCV   92           Mono #   0.7           Mono%   7.7           MPV   10.7           nRBC   2           Phosphorus 3.4     4.0       Platelets   195           POC ACTIVATED CLOTTING TIME K               POC BE         -3     POC HCO3         22.0     POC Hematocrit         22     POC Ionized Calcium         1.39     POC PCO2         35.2     POC PH         7.403     POC PO2         24     POC Potassium         4.6     POC SATURATED O2         44     POC Sodium         139     POC TCO2         23     POCT Glucose     188         Potassium 4.4     4.6       PROTEIN TOTAL               Protime   10.4           RBC   3.00           RDW   19.8           Sample         VENOUS     Sodium 137     137       Vancomycin, Random               WBC   9.50                                  Assessment and Plan:     Overall: Off pressors and IABP support   Awaiting thalium viability scan, will evaluate suitability for PCI thereafter. Not a candidate for CABG.      NSTEMI  Off heparin gtt   DAPT with ASA and Plavix   Toprol XL   Thalium Viability today, thereafter will evaluate candidacy for PCI       Pressure injury of sacral region, stage 2  Wound care consult    Septic shock    -Cont antibiotics for sepsis of unknown source  No obvious source of infection,       Acute on chronic systolic heart failure  -Toprol and ACE-I initiated       ESRD (end stage renal disease) on dialysis  - Cont dialysis via L Femoral tunneled dialysis line    Anemia in chronic kidney disease, on chronic  dialysis  Epo per nephrology          VTE Risk Mitigation (From admission, onward)        Ordered     heparin infusion 1,000 units/500 ml in 0.9% NaCl (pressure line flush)  Intra-op continuous PRN      04/15/19 1556     IP VTE HIGH RISK PATIENT  Once      04/12/19 0440          Clare Dejesus MD  Cardiology  [facility]

## 2019-04-18 NOTE — PHYSICIAN QUERY
PT Name: Jenni Todd  MR #: [id number]     Physician Query Form - Documentation Clarification      CDS/: Sahara Frederick Rn              Contact information: [phone]    This form is a permanent document in the medical record.     Query Date: April 18, 2019    By submitting this query, we are merely seeking further clarification of documentation. Please utilize your independent clinical judgment when addressing the question(s) below.    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

## 2019-04-18 NOTE — PROGRESS NOTES
CRRT NOTES    Continuous SLED started via left femoral temporary catheter, post TPA dwell. Both lumens with good flows. Lines reversed for optimal blood flows, 180-200cc/min. UF rate at 350cc/hr as ordered    See flow sheet

## 2019-04-18 NOTE — PLAN OF CARE
Problem: Adult Inpatient Plan of Care  Goal: Plan of Care Review  Outcome: Ongoing (interventions implemented as appropriate)  Patient alert and oriented x 4.  No acute events overnight.  Patient becoming frustrated with POC - she was upset that IABP was not removed yesterday.  She is also getting frustrated with continuous dialysis.   She reports she has trouble sleeping in the hospital.  Ms. Todd did not sleep at all the entire night.       Respiratory - RA.       Cardiovascular - NSR with BBB.  IABP to right groin - 1:1, EKG trigger, augmenting >115 all shift, no alarms overnight.      GI - BM x 1.  Immodium given once per patient request.       - Anuric.  CRRT (SLED) in place to left groin trialysis line.      Tessalon perles given x 1 for dry cough.       Bath and linen change completed.      Abdominal mepilex dressings in place.          Problem: Skin Injury Risk Increased  Goal: Skin Health and Integrity    Intervention: Optimize Skin Protection  Encouraged patient to turn frequently.  She does not like the waffle mattress (mattress removed 4/17).  She also does not like the heel mepilex dressings or heel boots.

## 2019-04-18 NOTE — ASSESSMENT & PLAN NOTE
ESRD on iHD TTS  [facility]  L femoral tunneled catheter (3/28)    49 yo female well known to our service who has been dialysis dependent for >15 years, who presents from SNF for hypotension and fever after hemodialysis on Thursday.  Nephrology has been consulted for ESRD management while in-patient.    HDS off pressor support.     Plan/recommendations:  -continue SLED until PET scan  -likely can hold RRT overnight.  -will re-evaluate this AM  -strict I/O's  -will closely monitor.

## 2019-04-18 NOTE — NURSING
"CRRT keeps beeping "Air Detector Alarm".  Dialysis RN unable to come to bedside (she is in another room with an HD patient).  Per Dialysis RN, rinse patient back.    "

## 2019-04-18 NOTE — SUBJECTIVE & OBJECTIVE
Interval History:   IABP removed today and site closed with perclose. Post removal triphasic DP pulse on right foot.     Review of Systems   Constitution: Negative for chills, decreased appetite and diaphoresis.   HENT: Negative for congestion and ear discharge.    Eyes: Negative for blurred vision and discharge.   Cardiovascular: Negative for chest pain, dyspnea on exertion, irregular heartbeat, leg swelling and paroxysmal nocturnal dyspnea.   Respiratory: Negative for cough, hemoptysis and shortness of breath.    Gastrointestinal: Negative for abdominal pain.     Objective:     Vital Signs (Most Recent):  Temp: 98.7 °F (37.1 °C) (04/18/19 0701)  Pulse: 105 (04/18/19 1000)  Resp: 10 (04/18/19 1000)  BP: 131/87 (04/18/19 1000)  SpO2: 99 % (04/18/19 1000) Vital Signs (24h Range):  Temp:  [98.2 °F (36.8 °C)-99.4 °F (37.4 °C)] 98.7 °F (37.1 °C)  Pulse:  [] 105  Resp:  [10-33] 10  SpO2:  [94 %-100 %] 99 %  BP: ()/(49-87) 131/87     Weight: 97.1 kg (214 lb)  Body mass index is 33.52 kg/m².     SpO2: 99 %  O2 Device (Oxygen Therapy): room air      Intake/Output Summary (Last 24 hours) at 4/18/2019 1234  Last data filed at 4/18/2019 1000  Gross per 24 hour   Intake 2901.91 ml   Output 3705 ml   Net -803.09 ml       Lines/Drains/Airways     Central Venous Catheter Line                 Hemodialysis Catheter 03/28/19 1528 left femoral 20 days          Drain                 Hemodialysis AV Fistula Left upper arm -- days         Hemodialysis AV Graft Right upper arm -- days          Peripheral Intravenous Line                 Peripheral IV - Single Lumen 04/17/19 1000 20 G;1 3/4 in Left Upper Arm 1 day                Physical Exam   Constitutional: She is oriented to person, place, and time. She appears well-developed and well-nourished. No distress.   HENT:   Mouth/Throat: Oropharynx is clear and moist.   Eyes: Conjunctivae are normal. No scleral icterus.   Neck: No tracheal deviation present. No thyromegaly  present.   Cardiovascular: Normal rate, regular rhythm, normal heart sounds and intact distal pulses. Exam reveals no gallop and no friction rub.   No murmur heard.  IABP R CFA  Dopplerable DP bilaterally  Tunneled dialysis catheter left groin.    Pulmonary/Chest: Effort normal and breath sounds normal. No respiratory distress. She has no wheezes. She has no rales.   Abdominal: Soft. Bowel sounds are normal. She exhibits distension. There is no tenderness.   Musculoskeletal: She exhibits no edema or deformity.   Neurological: She is alert and oriented to person, place, and time. No cranial nerve deficit. Coordination normal.   Skin: Skin is warm and dry. She is not diaphoretic.   Psychiatric: She has a normal mood and affect. Her behavior is normal.   Nursing note and vitals reviewed.      Significant Labs:   Recent Lab Results       04/18/19  0854   04/18/19  0854   04/18/19  0654   04/18/19  0500   04/17/19  2354        Albumin       1.8              1.8       Alkaline Phosphatase       95       Allens Test               ALT       92       Anion Gap       9       aPTT     40.6  Comment:  aPTT therapeutic range = 39-69 seconds         AST       21       Baso #       0.02              0.02       Basophil%       0.3              0.3       Bilirubin, Direct       0.3       BILIRUBIN TOTAL       0.5  Comment:  For infants and newborns, interpretation of results should be based  on gestational age, weight and in agreement with clinical  observations.  Premature Infant recommended reference ranges:  Up to 24 hours.............<8.0 mg/dL  Up to 48 hours............<12.0 mg/dL  3-5 days..................<15.0 mg/dL  6-29 days.................<15.0 mg/dL         Site               BUN, Bld       6       Calcium       8.6       Chloride       107       CO2       25       Creatinine       1.8       Differential Method       Automated              Automated       eGFR if        37.6       eGFR if non   American       32.6  Comment:  Calculation used to obtain the estimated glomerular filtration  rate (eGFR) is the CKD-EPI equation.          Eos #       0.2              0.2       Eosinophil%       2.9              2.9       Glucose       126       Gran # (ANC)       3.8              3.8       Gran%       52.3              52.3       Hematocrit       26.7              26.7       Hemoglobin       8.6              8.6       Immature Grans (Abs)       0.04  Comment:  Mild elevation in immature granulocytes is non specific and   can be seen in a variety of conditions including stress response,   acute inflammation, trauma and pregnancy. Correlation with other   laboratory and clinical findings is essential.                0.04  Comment:  Mild elevation in immature granulocytes is non specific and   can be seen in a variety of conditions including stress response,   acute inflammation, trauma and pregnancy. Correlation with other   laboratory and clinical findings is essential.         Immature Granulocytes       0.5              0.5       Coumadin Monitoring INR               Lymph #       2.6              2.6       Lymph%       35.7              35.7       Magnesium       1.7       MCH       29.3              29.3       MCHC       32.2              32.2       MCV       91              91       Mono #       0.6              0.6       Mono%       8.3              8.3       MPV       10.6              10.6       nRBC       2              2       Phosphorus       2.4       Platelets       192              192       POC ACTIVATED CLOTTING TIME K 147             POC BE               POC HCO3               POC Hematocrit               POC Ionized Calcium               POC PCO2               POC PH               POC PO2               POC Potassium               POC SATURATED O2               POC Sodium               POC TCO2               POCT Glucose   135     162     Potassium       4.4       PROTEIN TOTAL       6.2        Protime               RBC       2.94              2.94       RDW       20.0              20.0       Sample unknown             Sodium       141       Vancomycin, Random       16.2       WBC       7.33              7.33                        04/17/19  2350   04/17/19  1817   04/17/19  1705   04/17/19  1518   04/17/19  1434        Albumin 1.8     1.7       Alkaline Phosphatase               Allens Test         N/A     ALT               Anion Gap 8     8       aPTT 29.3  Comment:  aPTT therapeutic range = 39-69 seconds <21.0  Comment:  aPTT therapeutic range = 39-69 seconds           AST               Baso #   0.02           Basophil%   0.2           Bilirubin, Direct               BILIRUBIN TOTAL               Site         Other     BUN, Bld 10     11       Calcium 9.1     9.5       Chloride 106     107       CO2 23     22       Creatinine 2.8     3.1       Differential Method   Automated           eGFR if African American 22.0     19.5       eGFR if non  19.1  Comment:  Calculation used to obtain the estimated glomerular filtration  rate (eGFR) is the CKD-EPI equation.        16.9  Comment:  Calculation used to obtain the estimated glomerular filtration  rate (eGFR) is the CKD-EPI equation.          Eos #   0.2           Eosinophil%   2.2           Glucose 144     145       Gran # (ANC)   5.7           Gran%   59.5           Hematocrit   27.7   25.0       Hemoglobin   8.7   7.7       Immature Grans (Abs)   0.05  Comment:  Mild elevation in immature granulocytes is non specific and   can be seen in a variety of conditions including stress response,   acute inflammation, trauma and pregnancy. Correlation with other   laboratory and clinical findings is essential.             Immature Granulocytes   0.5           Coumadin Monitoring INR   1.0  Comment:  Coumadin Therapy:  2.0 - 3.0 for INR for all indicators except mechanical heart valves  and antiphospholipid syndromes which should use 2.5 - 3.5.              Lymph #   2.8           Lymph%   29.9           Magnesium 1.8     2.1       MCH   29.0           MCHC   31.4           MCV   92           Mono #   0.7           Mono%   7.7           MPV   10.7           nRBC   2           Phosphorus 3.4     4.0       Platelets   195           POC ACTIVATED CLOTTING TIME K               POC BE         -3     POC HCO3         22.0     POC Hematocrit         22     POC Ionized Calcium         1.39     POC PCO2         35.2     POC PH         7.403     POC PO2         24     POC Potassium         4.6     POC SATURATED O2         44     POC Sodium         139     POC TCO2         23     POCT Glucose     188         Potassium 4.4     4.6       PROTEIN TOTAL               Protime   10.4           RBC   3.00           RDW   19.8           Sample         VENOUS     Sodium 137     137       Vancomycin, Random               WBC   9.50

## 2019-04-18 NOTE — NURSING
Cathflo given by day shift RNs.  Blood return now noted from left femoral trialysis line.  Dialysis RN called to notify.

## 2019-04-18 NOTE — PROGRESS NOTES
Pharmacokinetic Assessment Follow Up: IV Vancomycin    Vancomycin serum concentration assessment(s):    The random level was drawned correctly and can be used to guide therapy at this time.    Vancomycin Regimen Plan:    Re-dose when the random level is less than 15 mcg/mL, next level to be drawn at 0400 on 4/19. Not continuing SLED tonight per Nephrology. iHD patient, will not redose at this time.     Pharmacy will continue to follow and monitor vancomycin.    Please contact pharmacy at extension [phone] for questions regarding this assessment.    Thank you for the consult,   Carrie Wilkinson, PharmD, BCPS, BCCP  Clinical Pharmacy Specialist - Cardiology  Spectra link: [phone]       Patient brief summary:  Jenni Todd is a 48 y.o. female initiated on antimicrobial therapy with IV Vancomycin for treatment of suspected sepsis    The patient received a loading dose, followed by the current treatment regimen: random levels with plan to redose when level is less than 15 mcg/mL    Drug Allergies:   Review of patient's allergies indicates:   Allergen Reactions    Clindamycin Diarrhea    Flagyl [metronidazole hcl]     Metronidazole        Actual Body Weight:   97.1 kg    Renal Function:   Estimated Creatinine Clearance: 47.9 mL/min (A) (based on SCr of 1.7 mg/dL (H)).,     Dialysis Method (if applicable):  SLED, on iHD TTS at home.    CBC (last 72 hours):  Recent Labs   Lab Result Units 04/15/19  2130 04/16/19  0529 04/17/19  0416 04/17/19  1117 04/17/19  1518 04/17/19  1817 04/18/19  0500   WBC K/uL 8.83 8.73 9.03 9.25  --  9.50 7.33  7.33   Hemoglobin g/dL 6.8* 7.8* 7.7* 8.0* 7.7* 8.7* 8.6*  8.6*   Hematocrit % 22.0* 25.2* 24.9* 25.4* 25.0* 27.7* 26.7*  26.7*   Platelets K/uL 270 253 250 212  --  195 192  192   Gran% % 65.5 61.4 53.2 59.0  --  59.5 52.3  52.3   Lymph% % 26.3 29.2 34.0 29.6  --  29.9 35.7  35.7   Mono% % 5.5 6.1 8.9 8.4  --  7.7 8.3  8.3   Eosinophil% % 1.9 2.7 3.1 1.8  --  2.2 2.9  2.9    Basophil% % 0.3 0.3 0.4 0.2  --  0.2 0.3  0.3   Differential Method  Automated Automated Automated Automated  --  Automated Automated  Automated       Metabolic Panel (last 72 hours):  Recent Labs   Lab Result Units 04/15/19  1800 04/15/19  2130 04/16/19  0529 04/16/19  1151 04/16/19  2230 04/17/19  0416 04/17/19  1518 04/17/19  2350 04/18/19  0500 04/18/19  1337   Sodium mmol/L 136 136 137 136 136 136 137 137 141 143   Potassium mmol/L 5.0 4.7 4.6 4.6 4.6 4.7 4.6 4.4 4.4 4.3   Chloride mmol/L 100 102 102 102 103 104 107 106 107 106   CO2 mmol/L 24 25 25 23 24 22* 22* 23 25 27   Glucose mg/dL 121* 149* 141* 157* 144* 142* 145* 144* 126* 133*   BUN, Bld mg/dL 27* 17 9 8 11 12 11 10 6 4*   Creatinine mg/dL 5.8* 3.7* 2.3* 2.3* 3.1* 3.4* 3.1* 2.8* 1.8* 1.7*   Albumin g/dL 1.7* 1.7* 1.7*  1.7* 1.7* 1.7* 1.7*  1.7* 1.7* 1.8* 1.8*  1.8* 1.9*   Total Bilirubin mg/dL 0.4  --  0.6  --   --  0.5  --   --  0.5  --    Alkaline Phosphatase U/L 99  --  121  --   --  97  --   --  95  --    AST U/L 86*  --  61*  --   --  30  --   --  21  --    ALT U/L 274*  --  237*  --   --  153*  --   --  92*  --    Magnesium mg/dL  --  1.8 1.8 1.7 1.9 2.4 2.1 1.8 1.7 2.3   Phosphorus mg/dL  --  4.2 3.0 3.4 4.4 4.7* 4.0 3.4 2.4* 2.6*       Vancomycin Administrations:  vancomycin given in the last 96 hours      No antibiotic orders with administrations found.                Drug levels (last 3 results):  Recent Labs   Lab Result Units 04/17/19  0416 04/18/19  0500 04/18/19  1337   Vancomycin, Random ug/mL 25.8 16.2 15.5       Microbiologic Results:  Microbiology Results (last 7 days)     Procedure Component Value Units Date/Time    Blood culture #1 **CANNOT BE ORDERED STAT** [[id number]] Collected:  04/12/19 0204    Order Status:  Completed Specimen:  Blood from Peripheral, Wrist, Left Updated:  04/17/19 0812     Blood Culture, Routine No growth after 5 days.    Blood culture #2 **CANNOT BE ORDERED STAT** [[id number]] Collected:  04/12/19 0222     Order Status:  Completed Specimen:  Blood from Peripheral, Hand, Left Updated:  04/17/19 0812     Blood Culture, Routine No growth after 5 days.

## 2019-04-18 NOTE — PLAN OF CARE
Problem: Adult Inpatient Plan of Care  Goal: Plan of Care Review  Outcome: Ongoing (interventions implemented as appropriate)  Pt AOx4, afebrile throughout shift. Runs ST. IABP pulled this am and CRRT stopped. Pt went to nuclear medicine NPI.  No acute events throughout day, VS and assessment per flow sheet, patient progressing towards goals as tolerated, plan of care reviewed with Jenni Todd and family, all concerns addressed, will continue to monitor.

## 2019-04-18 NOTE — PROGRESS NOTES
[facility]  Nephrology  Progress Note    Patient Name: Jenni Todd  MRN: [id number]  Admission Date: 4/12/2019  Hospital Length of Stay: 6 days  Attending Provider: Dionne Bullock MD   Primary Care Physician: Michael Tan Iii, MD  Principal Problem:Cardiogenic shock    Subjective:     Interval History:   SLED overnight, net negative 903 ml/24h.  IABP removed this AM.  Scheduled for Nuc Med scan today.    Review of patient's allergies indicates:   Allergen Reactions    Clindamycin Diarrhea    Flagyl [metronidazole hcl]     Metronidazole      Current Facility-Administered Medications   Medication Frequency    0.9%  NaCl infusion (CRRT USE ONLY) Continuous    0.9%  NaCl infusion (for blood administration) Q24H PRN    0.9%  NaCl infusion (for blood administration) Q24H PRN    0.9%  NaCl infusion (for blood administration) Q24H PRN    albuterol-ipratropium 2.5 mg-0.5 mg/3 mL nebulizer solution 3 mL Q4H PRN    aspirin EC tablet 81 mg Daily    atorvastatin tablet 40 mg QHS    benzonatate capsule 100 mg TID PRN    calcitRIOL capsule 0.5 mcg Daily    clopidogrel tablet 75 mg Daily    dextrose 50% injection 12.5 g PRN    dextrose 50% injection 25 g PRN    glucagon (human recombinant) injection 1 mg PRN    glucose chewable tablet 16 g PRN    glucose chewable tablet 24 g PRN    heparin infusion 1,000 units/500 ml in 0.9% NaCl (pressure line flush) Continuous PRN    insulin aspart U-100 pen 0-5 Units QID (AC + HS) PRN    lidocaine (PF) 10 mg/ml (1%) 10 mg/mL (1 %) injection     lidocaine (PF) 10 mg/ml (1%) 10 mg/mL (1 %) injection     lisinopril tablet 2.5 mg Daily    loperamide capsule 2 mg TID PRN    magnesium sulfate 2g in water 50mL IVPB (premix) PRN    magnesium sulfate 2g in water 50mL IVPB (premix) PRN    metoprolol succinate (TOPROL-XL) 24 hr tablet 25 mg Daily    pantoprazole EC tablet 40 mg Daily    piperacillin-tazobactam 4.5 g in sodium chloride 0.9% 100 mL  IVPB (ready to mix system) Q12H    sodium chloride 0.9% flush 10 mL PRN       Objective:     Vital Signs (Most Recent):  Temp: 98.7 °F (37.1 °C) (04/18/19 0701)  Pulse: (!) 120 (04/18/19 0900)  Resp: (!) 27 (04/18/19 0900)  BP: (!) 93/49 (04/18/19 0900)  SpO2: 98 % (04/18/19 0900)  O2 Device (Oxygen Therapy): room air (04/18/19 0900) Vital Signs (24h Range):  Temp:  [98.2 °F (36.8 °C)-99.4 °F (37.4 °C)] 98.7 °F (37.1 °C)  Pulse:  [] 120  Resp:  [10-36] 27  SpO2:  [94 %-100 %] 98 %  BP: ()/(49-66) 93/49     Weight: 97.1 kg (214 lb) (04/14/19 1815)  Body mass index is 33.52 kg/m².  Body surface area is 2.14 meters squared.    I/O last 3 completed shifts:  In: 5231.9 [P.O.:390; I.V.:4100.9; Blood:350; IV Piggyback:391]  Out: 7336 [Other:7336]    Physical Exam   Constitutional: She is oriented to person, place, and time. She appears well-developed and well-nourished. No distress.   HENT:   Head: Normocephalic and atraumatic.   Right Ear: External ear normal.   Left Ear: External ear normal.   Eyes: Conjunctivae and EOM are normal. Right eye exhibits no discharge. Left eye exhibits no discharge.   Neck: Normal range of motion. Neck supple.   Cardiovascular: Normal rate and regular rhythm. Exam reveals no gallop and no friction rub.   No murmur heard.  Pulmonary/Chest: Effort normal and breath sounds normal. No respiratory distress. She has no wheezes. She has no rales.   Abdominal: Soft. Bowel sounds are normal. She exhibits no distension. There is no tenderness.   Musculoskeletal: Normal range of motion. She exhibits no edema or deformity.   Neurological: She is alert and oriented to person, place, and time.   Skin: Skin is warm and dry. She is not diaphoretic.   L femoral tunneled dialysis catheter   Psychiatric: She has a normal mood and affect. Her behavior is normal.       Significant Labs:  CBC:   Recent Labs   Lab 04/18/19  0500   WBC 7.33  7.33   RBC 2.94*  2.94*   HGB 8.6*  8.6*   HCT 26.7*   26.7*     192   MCV 91  91   MCH 29.3  29.3   MCHC 32.2  32.2     CMP:   Recent Labs   Lab 04/18/19  0500   *   CALCIUM 8.6*   ALBUMIN 1.8*  1.8*   PROT 6.2      K 4.4   CO2 25      BUN 6   CREATININE 1.8*   ALKPHOS 95   ALT 92*   AST 21   BILITOT 0.5            Assessment/Plan:     ESRD (end stage renal disease) on dialysis  ESRD on iHD TTS  [facility]  L femoral tunneled catheter (3/28)    47 yo female well known to our service who has been dialysis dependent for >15 years, who presents from SNF for hypotension and fever after hemodialysis on Thursday.  Nephrology has been consulted for ESRD management while in-patient.    HDS off pressor support.     Plan/recommendations:  -continue SLED until Nuc Med scan  -likely can hold RRT overnight.  -will re-evaluate this AM  -strict I/O's  -will closely monitor.      Andrew Ragsdale NP  Nephrology  [facility]

## 2019-04-18 NOTE — SUBJECTIVE & OBJECTIVE
Interval History: Dry cough this AM but otherwise no complaints. No CP or SOB.     Balloon pump now removed, blood pressures stable. Will get HD for next dialysis session.     Review of Systems   Constitution: Negative for chills, decreased appetite and diaphoresis.   HENT: Negative for congestion and ear discharge.    Eyes: Negative for blurred vision and discharge.   Cardiovascular: Negative for chest pain, dyspnea on exertion, irregular heartbeat, leg swelling and paroxysmal nocturnal dyspnea.   Respiratory: Positive for cough. Negative for hemoptysis and shortness of breath.    Musculoskeletal: Negative for muscle cramps and muscle weakness.   Gastrointestinal: Positive for bloating. Negative for abdominal pain, nausea and vomiting.   Genitourinary: Negative for flank pain.   Neurological: Negative for dizziness and paresthesias.   Psychiatric/Behavioral: Negative for altered mental status.     Objective:     Vital Signs (Most Recent):  Temp: 99.3 °F (37.4 °C) (04/18/19 1100)  Pulse: (!) 118 (04/18/19 1300)  Resp: (!) 28 (04/18/19 1300)  BP: (!) 151/84 (04/18/19 1100)  SpO2: 97 % (04/18/19 1300) Vital Signs (24h Range):  Temp:  [98.2 °F (36.8 °C)-99.4 °F (37.4 °C)] 99.3 °F (37.4 °C)  Pulse:  [] 118  Resp:  [10-33] 28  SpO2:  [94 %-100 %] 97 %  BP: ()/(49-87) 151/84     Weight: 97.1 kg (214 lb)  Body mass index is 33.52 kg/m².     SpO2: 97 %  O2 Device (Oxygen Therapy): room air      Intake/Output Summary (Last 24 hours) at 4/18/2019 1511  Last data filed at 4/18/2019 1400  Gross per 24 hour   Intake 3461.91 ml   Output 3764 ml   Net -302.09 ml       Lines/Drains/Airways     Central Venous Catheter Line                 Hemodialysis Catheter 03/28/19 1528 left femoral 20 days          Drain                 Hemodialysis AV Fistula Left upper arm -- days         Hemodialysis AV Graft Right upper arm -- days          Peripheral Intravenous Line                 Peripheral IV - Single Lumen 04/17/19 1000 20  G;1 3/4 in Left Upper Arm 1 day                Physical Exam   Constitutional: She is oriented to person, place, and time. She appears well-developed and well-nourished. No distress.   HENT:   Mouth/Throat: Oropharynx is clear and moist.   Eyes: Conjunctivae are normal. No scleral icterus.   Neck: No tracheal deviation present. No thyromegaly present.   Cardiovascular: Normal rate, regular rhythm, normal heart sounds and intact distal pulses. Exam reveals no gallop and no friction rub.   No murmur heard.  Bandage over right groin. No hematoma  Tunneled dialysis catheter left groin.    Pulmonary/Chest: Effort normal and breath sounds normal. No respiratory distress. She has no wheezes. She has no rales.   Abdominal: Soft. Bowel sounds are normal. She exhibits distension. There is no tenderness.   Musculoskeletal: She exhibits no edema or deformity.   Neurological: She is alert and oriented to person, place, and time. No cranial nerve deficit. Coordination normal.   Skin: Skin is warm and dry. She is not diaphoretic.   Psychiatric: She has a normal mood and affect. Her behavior is normal.   Nursing note and vitals reviewed.      Significant Labs:   Recent Lab Results       04/19/19  1209   04/19/19  0758   04/19/19  0330   04/18/19  2321        Albumin     1.8            1.8       Alkaline Phosphatase     93       ALT     69       Anion Gap     10       AST     16       Baso #     0.04       Basophil%     0.4       Bilirubin, Direct     0.3       BILIRUBIN TOTAL     0.4  Comment:  For infants and newborns, interpretation of results should be based  on gestational age, weight and in agreement with clinical  observations.  Premature Infant recommended reference ranges:  Up to 24 hours.............<8.0 mg/dL  Up to 48 hours............<12.0 mg/dL  3-5 days..................<15.0 mg/dL  6-29 days.................<15.0 mg/dL         BUN, Bld     7       Calcium     9.6       Chloride     105       CO2     27        Creatinine     2.5       Differential Method     Automated       eGFR if      25.2       eGFR if non      21.9  Comment:  Calculation used to obtain the estimated glomerular filtration  rate (eGFR) is the CKD-EPI equation.          Eos #     0.2       Eosinophil%     2.3       Glucose     148       Gran # (ANC)     5.1       Gran%     52.5       Hematocrit     27.0       Hemoglobin     8.4       Immature Grans (Abs)     0.05  Comment:  Mild elevation in immature granulocytes is non specific and   can be seen in a variety of conditions including stress response,   acute inflammation, trauma and pregnancy. Correlation with other   laboratory and clinical findings is essential.         Immature Granulocytes     0.5       Lymph #     3.5       Lymph%     36.5       MCH     28.9       MCHC     31.1       MCV     93       Mono #     0.8       Mono%     7.8       MPV     11.1       nRBC     0       Phosphorus     3.6       Platelets     219       POCT Glucose 214 143   154     Potassium     4.4       PROTEIN TOTAL     6.4       RBC     2.91       RDW     20.3       Sodium     142       Vancomycin, Random     15.0       WBC     9.70

## 2019-04-19 PROBLEM — A41.9 SEPTIC SHOCK: Status: RESOLVED | Noted: 2019-01-01 | Resolved: 2019-01-01

## 2019-04-19 PROBLEM — R57.0 CARDIOGENIC SHOCK: Status: RESOLVED | Noted: 2019-01-01 | Resolved: 2019-01-01

## 2019-04-19 PROBLEM — R65.21 SEPTIC SHOCK: Status: RESOLVED | Noted: 2019-01-01 | Resolved: 2019-01-01

## 2019-04-19 PROBLEM — I50.21 ACUTE SYSTOLIC HEART FAILURE: Status: ACTIVE | Noted: 2019-01-01

## 2019-04-19 NOTE — SUBJECTIVE & OBJECTIVE
Interval History: SLED during the day yesterday, no recorded UOP, held SLED o/n, 24hrs matching Is & Os    Review of patient's allergies indicates:   Allergen Reactions    Clindamycin Diarrhea    Flagyl [metronidazole hcl]     Metronidazole      Current Facility-Administered Medications   Medication Frequency    albuterol-ipratropium 2.5 mg-0.5 mg/3 mL nebulizer solution 3 mL Q4H PRN    aspirin EC tablet 81 mg Daily    atorvastatin tablet 40 mg QHS    benzonatate capsule 100 mg TID PRN    calcitRIOL capsule 0.5 mcg Daily    clopidogrel tablet 75 mg Daily    dextrose 50% injection 12.5 g PRN    dextrose 50% injection 25 g PRN    glucagon (human recombinant) injection 1 mg PRN    glucose chewable tablet 16 g PRN    glucose chewable tablet 24 g PRN    insulin aspart U-100 pen 0-5 Units QID (AC + HS) PRN    lisinopril tablet 2.5 mg Daily    loperamide capsule 2 mg TID PRN    metoprolol succinate (TOPROL-XL) 24 hr tablet 25 mg Daily    pantoprazole EC tablet 40 mg Daily    piperacillin-tazobactam 4.5 g in sodium chloride 0.9% 100 mL IVPB (ready to mix system) Q12H    sodium chloride 0.9% flush 10 mL PRN       Objective:     Vital Signs (Most Recent):  Temp: 98.9 °F (37.2 °C) (04/19/19 0405)  Pulse: 110 (04/19/19 0814)  Resp: (!) 24 (04/19/19 0751)  BP: (!) 143/87 (04/19/19 0814)  SpO2: 97 % (04/19/19 0751)  O2 Device (Oxygen Therapy): room air (04/19/19 0751) Vital Signs (24h Range):  Temp:  [98.9 °F (37.2 °C)-99.3 °F (37.4 °C)] 98.9 °F (37.2 °C)  Pulse:  [100-120] 110  Resp:  [10-38] 24  SpO2:  [92 %-100 %] 97 %  BP: ()/(49-94) 143/87     Weight: 97.1 kg (214 lb 1.1 oz) (04/18/19 0701)  Body mass index is 33.53 kg/m².  Body surface area is 2.14 meters squared.    I/O last 3 completed shifts:  In: 2999.8 [P.O.:410; I.V.:2244.3; IV Piggyback:345.5]  Out: 3406 [Other:3406]    Physical Exam   HENT:   Head: Atraumatic.   Neck: No JVD present.   Cardiovascular: Normal rate and regular rhythm.    Pulmonary/Chest: Effort normal and breath sounds normal.   Abdominal: Soft. She exhibits no distension.   Musculoskeletal: She exhibits no edema or tenderness.   Neurological: She is alert.   Skin: Skin is warm.       Significant Labs:  CBC:   Recent Labs   Lab 04/19/19  0330   WBC 9.70   RBC 2.91*   HGB 8.4*   HCT 27.0*      MCV 93   MCH 28.9   MCHC 31.1*     CMP:   Recent Labs   Lab 04/19/19  0330   *   CALCIUM 9.6   ALBUMIN 1.8*  1.8*   PROT 6.4      K 4.4   CO2 27      BUN 7   CREATININE 2.5*   ALKPHOS 93   ALT 69*   AST 16   BILITOT 0.4     All labs within the past 24 hours have been reviewed.

## 2019-04-19 NOTE — PLAN OF CARE
"Problem: Adult Inpatient Plan of Care  Goal: Plan of Care Review  No acute events throughout shift. See vital signs and assessments for documentation. See below for updates.    Pulmonary:  Room Air    Cardiac: Sinus Tachycardia    Neurological: AAO X 4    Gastrointestinal: No BM this shift.     Genitourinary: Anuric    Endocrine: Diabetic, Renal diet. Accuchecks ACHS.    Integumentary/other: Dressings in place to abdomen.  Patient refused wound care and bath this shift.  Reports that room is "too cold for that" maintenance called for temperature adjustment since 0900.    Gtts:  N/A    POC: Stepdown out of Intensive Care unit.       Patient NIDHI Todd and family updated on POC. Questions and concerns addressed. WCTM        "

## 2019-04-19 NOTE — ASSESSMENT & PLAN NOTE
Not decompensated. Balloon pump removed.   -LV EF of 25%, GDMT initiated with small doses of Acei and B Blocker. Will uptitrate during hospitalization.   Not a candidate for revascularization, will need consultation with HTS for advanced options as an outpatient.

## 2019-04-19 NOTE — PLAN OF CARE
Problem: Adult Inpatient Plan of Care  Goal: Plan of Care Review  Outcome: Ongoing (interventions implemented as appropriate)  Pt stable throughout the night; IABP DC'd yesterday and CRRT currently stopped. Pt went to nuclear medicine NPI. VSS.  Pt AAO X 4.  O2 sat's 95-98% on room air  No acute events throughout night. See vital signs and assessments in flowsheets. See below for updates on today's progress.  Patient progressing towards goals as tolerated, plan of care communicated and reviewed with Jenni Todd, all concerns addressed, will continue to monitor.

## 2019-04-19 NOTE — ASSESSMENT & PLAN NOTE
ESRD on iHD TTS  [facility]  L femoral tunneled catheter (3/28)    47 yo female well known to our service who has been dialysis dependent for >15 years, who presents from SNF for hypotension and fever after hemodialysis on Thursday.  Nephrology has been consulted for ESRD management while in-patient.    HDS off pressor support.     Plan/recommendations:  -tentatively for SLED overnight, also becoming hemodynamically stable, so likely would tolerate regular HD, but for now and while in ICU continue intermittent SLED  -will re-evaluate this AM  -strict I/O's  -will closely monitor.

## 2019-04-19 NOTE — RESIDENT HANDOFF
Handoff     [place] [facility] [place] Room Number: [id number]     Patient Name: Jenni Todd MRN: [id number]     Date of Birth: [id number] Allergies: Clindamycin; Flagyl [metronidazole hcl]; and Metronidazole     Age: 48 y.o. Admit Date: 4/12/2019     Sex: female  BMI: Body mass index is 33.53 kg/m².     Code Status: Full Code        Illness Level (current clinical status): Watcher - No    Reason for Admission: Cardiogenic shock    Brief HPI (pertinent PMH and diagnosis or differential diagnosis): 48 F with long standing ESRD, previously on PD and now with tunneled femoral catheter in place presented with septic shock of unknown etiology, found to have NSTEMI with new HFrEF. Managed medically. Balloon pump left in place after angiogram for high LVEDP but not hypotension gavino procedurally.     Procedure Date: [facility] 4/15    Hospital Course (updated, brief assessment by system or problem, significant events):     Thallium viability performed 4/18 which showed apical scar.     Tasks (specific, using if-then statements):     HFrEF- uptitrate GDMT    ESRD- Nephrology following, HD today    CAD- medical management, off heparin. No surgical options offered. Needs outpatient follow up with HTS, referral placed.     Sepsis?- No clear source. Completed course of abx today    Anemia- 2 units PRBCs transfused this admission. Likely ACD with some blood loss during dialysis 4/17.         Estimated Discharge Date: 4/22    Discharge Disposition: Skilled Nursing Facility    Mentored By: Dr. Ngo

## 2019-04-19 NOTE — PROGRESS NOTES
Therapy with vancomycin complete and consult discontinued per Dr. Dejesus.  Pharmacy will sign off, please re-consult as needed.

## 2019-04-19 NOTE — ASSESSMENT & PLAN NOTE
Acute on chronic anemia  Chronic likely 2/2 ESRD, on Epo as outpatient.   Blood lost during dialysis due to open valve.   Type and Screen sent  Repeat hemoglobin unchanged but became hypotensive so 1 unit PRBCs ordered.     Stable today.

## 2019-04-19 NOTE — PROGRESS NOTES
[facility]  Cardiology  Progress Note    Patient Name: Jenni Todd  MRN: [id number]  Admission Date: 4/12/2019  Hospital Length of Stay: 7 days  Code Status: Full Code   Attending Physician: Vadim Ngo MD   Primary Care Physician: Michael Tan Iii, MD  Expected Discharge Date:   Principal Problem:Cardiogenic shock    Subjective:     Hospital Course:   No notes on file    Interval History: Dry cough this AM but otherwise no complaints. No CP or SOB.     Balloon pump now removed, blood pressures stable. Will get HD for next dialysis session.     Review of Systems   Constitution: Negative for chills, decreased appetite and diaphoresis.   HENT: Negative for congestion and ear discharge.    Eyes: Negative for blurred vision and discharge.   Cardiovascular: Negative for chest pain, dyspnea on exertion, irregular heartbeat, leg swelling and paroxysmal nocturnal dyspnea.   Respiratory: Positive for cough. Negative for hemoptysis and shortness of breath.    Musculoskeletal: Negative for muscle cramps and muscle weakness.   Gastrointestinal: Positive for bloating. Negative for abdominal pain, nausea and vomiting.   Genitourinary: Negative for flank pain.   Neurological: Negative for dizziness and paresthesias.   Psychiatric/Behavioral: Negative for altered mental status.     Objective:     Vital Signs (Most Recent):  Temp: 99.3 °F (37.4 °C) (04/18/19 1100)  Pulse: (!) 118 (04/18/19 1300)  Resp: (!) 28 (04/18/19 1300)  BP: (!) 151/84 (04/18/19 1100)  SpO2: 97 % (04/18/19 1300) Vital Signs (24h Range):  Temp:  [98.2 °F (36.8 °C)-99.4 °F (37.4 °C)] 99.3 °F (37.4 °C)  Pulse:  [] 118  Resp:  [10-33] 28  SpO2:  [94 %-100 %] 97 %  BP: ()/(49-87) 151/84     Weight: 97.1 kg (214 lb)  Body mass index is 33.52 kg/m².     SpO2: 97 %  O2 Device (Oxygen Therapy): room air      Intake/Output Summary (Last 24 hours) at 4/18/2019 1511  Last data filed at 4/18/2019 1400  Gross per 24 hour   Intake  3461.91 ml   Output 3764 ml   Net -302.09 ml       Lines/Drains/Airways     Central Venous Catheter Line                 Hemodialysis Catheter 03/28/19 1528 left femoral 20 days          Drain                 Hemodialysis AV Fistula Left upper arm -- days         Hemodialysis AV Graft Right upper arm -- days          Peripheral Intravenous Line                 Peripheral IV - Single Lumen 04/17/19 1000 20 G;1 3/4 in Left Upper Arm 1 day                Physical Exam   Constitutional: She is oriented to person, place, and time. She appears well-developed and well-nourished. No distress.   HENT:   Mouth/Throat: Oropharynx is clear and moist.   Eyes: Conjunctivae are normal. No scleral icterus.   Neck: No tracheal deviation present. No thyromegaly present.   Cardiovascular: Normal rate, regular rhythm, normal heart sounds and intact distal pulses. Exam reveals no gallop and no friction rub.   No murmur heard.  Bandage over right groin. No hematoma  Tunneled dialysis catheter left groin.    Pulmonary/Chest: Effort normal and breath sounds normal. No respiratory distress. She has no wheezes. She has no rales.   Abdominal: Soft. Bowel sounds are normal. She exhibits distension. There is no tenderness.   Musculoskeletal: She exhibits no edema or deformity.   Neurological: She is alert and oriented to person, place, and time. No cranial nerve deficit. Coordination normal.   Skin: Skin is warm and dry. She is not diaphoretic.   Psychiatric: She has a normal mood and affect. Her behavior is normal.   Nursing note and vitals reviewed.      Significant Labs:   Recent Lab Results       04/19/19  1209   04/19/19  0758   04/19/19  0330   04/18/19  2321        Albumin     1.8            1.8       Alkaline Phosphatase     93       ALT     69       Anion Gap     10       AST     16       Baso #     0.04       Basophil%     0.4       Bilirubin, Direct     0.3       BILIRUBIN TOTAL     0.4  Comment:  For infants and newborns,  interpretation of results should be based  on gestational age, weight and in agreement with clinical  observations.  Premature Infant recommended reference ranges:  Up to 24 hours.............<8.0 mg/dL  Up to 48 hours............<12.0 mg/dL  3-5 days..................<15.0 mg/dL  6-29 days.................<15.0 mg/dL         BUN, Bld     7       Calcium     9.6       Chloride     105       CO2     27       Creatinine     2.5       Differential Method     Automated       eGFR if      25.2       eGFR if non      21.9  Comment:  Calculation used to obtain the estimated glomerular filtration  rate (eGFR) is the CKD-EPI equation.          Eos #     0.2       Eosinophil%     2.3       Glucose     148       Gran # (ANC)     5.1       Gran%     52.5       Hematocrit     27.0       Hemoglobin     8.4       Immature Grans (Abs)     0.05  Comment:  Mild elevation in immature granulocytes is non specific and   can be seen in a variety of conditions including stress response,   acute inflammation, trauma and pregnancy. Correlation with other   laboratory and clinical findings is essential.         Immature Granulocytes     0.5       Lymph #     3.5       Lymph%     36.5       MCH     28.9       MCHC     31.1       MCV     93       Mono #     0.8       Mono%     7.8       MPV     11.1       nRBC     0       Phosphorus     3.6       Platelets     219       POCT Glucose 214 143   154     Potassium     4.4       PROTEIN TOTAL     6.4       RBC     2.91       RDW     20.3       Sodium     142       Vancomycin, Random     15.0       WBC     9.70               Assessment and Plan:     Brief HPI: 48 F with ESRD who presented with sepsis and found to have NSTEMI with 3 vessel CAD and HFrEF. S/p treatment for sepsis unknown etiology, not a candidate for revascularization being managed medically.     Pressure injury of sacral region, stage 2  Wound care consult    NSTEMI (non-ST elevated myocardial  infarction)  -c/w ACS protocol (DAPT, statin, heparin drip stopped)   -NSTEMI with troponin up to 16, [facility] with 3 vessel disease 99% LAD, 80%LCX, 100% RCA, LVEDP in 30s, hypotensive, IABP placed. CT surgery consulted for CABG Eval, not a candidate due to comorbidities.   -HTS consult as outpatient to discuss advanced options      Acute systolic heart failure  Not decompensated. Balloon pump removed.   -LV EF of 25%, GDMT initiated with small doses of Acei and B Blocker. Will uptitrate during hospitalization.   Not a candidate for revascularization, will need consultation with HTS for advanced options as an outpatient.     Acute blood loss anemia  Acute on chronic anemia  Chronic likely 2/2 ESRD, on Epo as outpatient.   Blood lost during dialysis due to open valve.   Type and Screen sent  Repeat hemoglobin unchanged but became hypotensive so 1 unit PRBCs ordered.     Stable today.     ESRD (end stage renal disease) on dialysis  - Cont dialysis via L Femoral tunneled dialysis line  -Next session will be HD    Anemia in chronic kidney disease, on chronic dialysis  Epo per nephrology  2 units PRBCs transfused this admission.         VTE Risk Mitigation (From admission, onward)        Ordered     heparin (porcine) injection 5,000 Units  Every 12 hours      04/19/19 1619     IP VTE HIGH RISK PATIENT  Once      04/12/19 0440          Jorge A Gomes MD   Internal Medicine PGY-2  [phone]

## 2019-04-19 NOTE — ASSESSMENT & PLAN NOTE
-c/w ACS protocol (DAPT, statin, heparin drip stopped)   -NSTEMI with troponin up to 16, [facility] with 3 vessel disease 99% LAD, 80%LCX, 100% RCA, LVEDP in 30s, hypotensive, IABP placed. CT surgery consulted for CABG Eval, not a candidate due to comorbidities.   -HTS consult as outpatient to discuss advanced options

## 2019-04-19 NOTE — PROGRESS NOTES
[facility]  Nephrology  Progress Note    Patient Name: Jenni Todd  MRN: [id number]  Admission Date: 4/12/2019  Hospital Length of Stay: 7 days  Attending Provider: Vadim Ngo MD   Primary Care Physician: Michael Tan Iii, MD  Principal Problem:Cardiogenic shock    Subjective:     HPI: Ms. Todd is a 47 yo female with CAD s/p 2 PCI (2012), HFrEF 30%, HTN, DM, and ESRD who presented from [facility] for hypotension and fever.  She reports going to dialysis on 4/11 in which 1.7L of fluid was removed.  She had hypotension during the treatment, but remained asymptomatic.  She was transferred back to [facility] afterwards where her blood pressures continued to run low, but patient started becoming symptomatic with chest pressure, lightheadedness and SOB.  Nursing staff at the [facility] were unable to obtain a blood pressure despite IVF bolus and she was then transferred to [facility] for [facility] management.  She had a fever of 101, which resolved with tylenol administration per her account.  In the ED, she was found to have continued hypotension and was started on levo for BP support.  Lactate elevated (>12) and she was bolused with 1.5L of NS.  VBG with pH of 7.3. She denies any fever/chills during dialysis. Nephrology has been consulted for ESRD management while in-patient.    She used to be on home CCPD, admitted to [facility] on 2/21 for peritonitis. PD catheter removed due to hx of recurrent peritonitis and she was transitioned to HD. Bilateral IJ tunneled catheters were attempted without success due to thrombus. Now has a temporary dialysis catheter in her left fem that was placed 3/28 by [facility].  Prior to permacath placement, she had a next day graft placed by Dr. Leger on 3/22, but it was never used and was clotted 1 day post-op.  Patient refused to have the graft declotted, so the permacatch was placed at that time.          Interval History: SLED during the day yesterday, no recorded UOP, held SLED  o/n, 24hrs matching Is & Os    Review of patient's allergies indicates:   Allergen Reactions    Clindamycin Diarrhea    Flagyl [metronidazole hcl]     Metronidazole      Current Facility-Administered Medications   Medication Frequency    albuterol-ipratropium 2.5 mg-0.5 mg/3 mL nebulizer solution 3 mL Q4H PRN    aspirin EC tablet 81 mg Daily    atorvastatin tablet 40 mg QHS    benzonatate capsule 100 mg TID PRN    calcitRIOL capsule 0.5 mcg Daily    clopidogrel tablet 75 mg Daily    dextrose 50% injection 12.5 g PRN    dextrose 50% injection 25 g PRN    glucagon (human recombinant) injection 1 mg PRN    glucose chewable tablet 16 g PRN    glucose chewable tablet 24 g PRN    insulin aspart U-100 pen 0-5 Units QID (AC + HS) PRN    lisinopril tablet 2.5 mg Daily    loperamide capsule 2 mg TID PRN    metoprolol succinate (TOPROL-XL) 24 hr tablet 25 mg Daily    pantoprazole EC tablet 40 mg Daily    piperacillin-tazobactam 4.5 g in sodium chloride 0.9% 100 mL IVPB (ready to mix system) Q12H    sodium chloride 0.9% flush 10 mL PRN       Objective:     Vital Signs (Most Recent):  Temp: 98.9 °F (37.2 °C) (04/19/19 0405)  Pulse: 110 (04/19/19 0814)  Resp: (!) 24 (04/19/19 0751)  BP: (!) 143/87 (04/19/19 0814)  SpO2: 97 % (04/19/19 0751)  O2 Device (Oxygen Therapy): room air (04/19/19 0751) Vital Signs (24h Range):  Temp:  [98.9 °F (37.2 °C)-99.3 °F (37.4 °C)] 98.9 °F (37.2 °C)  Pulse:  [100-120] 110  Resp:  [10-38] 24  SpO2:  [92 %-100 %] 97 %  BP: ()/(49-94) 143/87     Weight: 97.1 kg (214 lb 1.1 oz) (04/18/19 0701)  Body mass index is 33.53 kg/m².  Body surface area is 2.14 meters squared.    I/O last 3 completed shifts:  In: 2999.8 [P.O.:410; I.V.:2244.3; IV Piggyback:345.5]  Out: 3406 [Other:3406]    Physical Exam   HENT:   Head: Atraumatic.   Neck: No JVD present.   Cardiovascular: Normal rate and regular rhythm.   Pulmonary/Chest: Effort normal and breath sounds normal.   Abdominal: Soft. She  exhibits no distension.   Musculoskeletal: She exhibits no edema or tenderness.   Neurological: She is alert.   Skin: Skin is warm.       Significant Labs:  CBC:   Recent Labs   Lab 04/19/19  0330   WBC 9.70   RBC 2.91*   HGB 8.4*   HCT 27.0*      MCV 93   MCH 28.9   MCHC 31.1*     CMP:   Recent Labs   Lab 04/19/19  0330   *   CALCIUM 9.6   ALBUMIN 1.8*  1.8*   PROT 6.4      K 4.4   CO2 27      BUN 7   CREATININE 2.5*   ALKPHOS 93   ALT 69*   AST 16   BILITOT 0.4     All labs within the past 24 hours have been reviewed.         Assessment/Plan:     ESRD (end stage renal disease) on dialysis  ESRD on iHD TTS  [facility] [place] [facility]  L femoral tunneled catheter (3/28)    47 yo female well known to our service who has been dialysis dependent for >15 years, who presents from SNF for hypotension and fever after hemodialysis on Thursday.  Nephrology has been consulted for ESRD management while in-patient.    HDS off pressor support.     Plan/recommendations:  -tentatively for SLED overnight, also becoming hemodynamically stable, so likely would tolerate regular HD, but for now and while in ICU continue intermittent SLED  -will re-evaluate this AM  -strict I/O's  -will closely monitor.        Thank you for your consult. I will follow-up with patient. Please contact us if you have any additional questions.    Heraclio Sahu MD  Nephrology  [facility]

## 2019-04-20 NOTE — EICU
Called charge nurse for cardiac SDU to inform her of BPA stated that pt should have vital signs taken q 2hrs x3 due to elevated resp rate causing a increased MEWS score.She verbalzied  OK

## 2019-04-20 NOTE — PROGRESS NOTES
Patient currently on hemodialysis for removal of uremic toxins and volume control.   I personally saw and examined the patient on hemodialysis.  The patient is tolerating the treatment, see hemodyalisis flow sheet for vitals and assessemts. I also reviewed the chart and current medication. The dialysis bath was adjusted.     Volume management/HTN: 2 liter UF  Anemia (target 10-12 mg/dl):   Bone metabolism: renal diet     Patient with a hacking cough, recently worsening. Would change her Lisinopril (possible associaction with lung ca) to an ARB (Irbesartan 75 mg daily).

## 2019-04-20 NOTE — PROGRESS NOTES
3 hour dialysis complete.  Blood rinsed back.  Left femoral permcath flushed with normal saline, both lumens filled with heparin, capped and taped.  Net UF 2L.  Hourly heparin boluses given to prevent clotting of dialyzer.  Tolerated tx.  Transported from dialysis unit to room 331A via stretcher by transporter.

## 2019-04-20 NOTE — PROGRESS NOTES
[facility] Hospital Medicine  Progress Note    Primary Team: [facility]OSP MED C  Admit Date: 2019    Subjective:        Interval History:  No complaints or AEON.     ROS:  General: no fever, no chills, no weight loss, no fatigue  Cardiovascular: no chest pain, no orthopnea, no dyspnea  Respiratory: no cough, no wheezes, no SOB  All other systems reviewed & are negative.     Objective:   Last 24 Hour Vital Signs:  BP  Min: 101/65  Max: 133/88  Temp  Av.2 °F (36.8 °C)  Min: 97.8 °F (36.6 °C)  Max: 98.4 °F (36.9 °C)  Pulse  Av.7  Min: 94  Max: 109  Resp  Av.3  Min: 16  Max: 41  SpO2  Av %  Min: 93 %  Max: 100 %  I/O last 3 completed shifts:  In: 1080 [P.O.:840; I.V.:40; IV Piggyback:200]  Out: -     Physical Examination:  GEN: AAOx3, NAD  HEENT: NCAT, MMM, PERRL, EOMI, oropharynx clear  CV: RRR, no m/r, no S3/S4  RESP: CTAB, no wheezes/crackles, no increased WOB  ABD: soft, NTND, normoactive BS, no organomegaly  EXTR: no c/c/e, intact distal pulses x 4  NEURO: PERRL, EOMI, moving all four extremities, intact sensation to light touch, no focal deficits  SKIN: no rashes, lesions, or color changes  PSYCH: normal affect    Laboratory:  I have reviewed all pertinent lab results/findings within the past 24 hours.    Radiology:  I have reviewed all pertinent imaging results/findings within the past 24 hours.    Current Medications:     Infusions:       Scheduled:   aspirin  81 mg Oral Daily    atorvastatin  40 mg Oral QHS    calcitRIOL  0.5 mcg Oral Daily    clopidogrel  75 mg Oral Daily    gabapentin  600 mg Oral TID    heparin (porcine)  5,000 Units Subcutaneous Q12H    lisinopril  2.5 mg Oral BID    metoprolol succinate  25 mg Oral Daily    pantoprazole  40 mg Oral Daily        PRN:  albuterol-ipratropium, benzonatate, dextrose 50%, dextrose 50%, glucagon (human recombinant), glucose, glucose, insulin aspart U-100, loperamide, sodium chloride 0.9%      Assessment/Plan:      Jenni Todd is a 48 F with ESRD who presented with sepsis and found to have NSTEMI with 3 vessel CAD and HFrEF. S/p treatment for sepsis unknown etiology, not a candidate for revascularization being managed medically.           NSTEMI (non-ST elevated myocardial infarction)  -s/p ACS protocol (DAPT, statin, heparin drip stopped)   -NSTEMI with troponin up to 16, [facility] with 3 vessel disease 99% LAD, 80%LCX, 100% RCA, LVEDP in 30s, hypotensive, IABP placed. CT surgery consulted for CABG Eval, not a candidate due to comorbidities.   -HTS consult as outpatient to discuss advanced options        Acute systolic heart failure  Not decompensated. Balloon pump removed.   -LV EF of 25%, GDMT initiated with small doses of ACEI and BB. Will uptitrate during hospitalization.   Not a candidate for revascularization, will need consultation with HTS for advanced options as an outpatient.           ESRD (end stage renal disease) on dialysis  -Previously PD patient; admitted 2/21 at [facility] for peritonitis, had PD catheter removed for recurrent peritonitis & transitioned to HD. Bilateral IJ tunneled catheters were attempted without success due to thrombus. Now has a temporary dialysis catheter in her left fem that was placed 3/28.  Prior to permacath placement, she had a next day graft placed by Dr. Leger on 3/22, but it was never used and was clotted 1 day post-op.  Patient refused to have the graft declotted, so the permacatch was placed at that time.  -Nephrology following. Received SLED in ICU. F/U recs re: HD  -Will have SW work on HD chair       Anemia in chronic kidney disease, on chronic dialysis  Epo per nephrology       Blood lost during dialysis due to open valve.   2 units PRBCs transfused this admission.   H/H stable    Pressure injury of sacral region, stage 2  Wound care consulted    PPX:   VTE Risk Mitigation (From admission, onward)        Ordered     heparin (porcine) injection 5,000 Units  Every 12 hours       04/19/19 1619     IP VTE HIGH RISK PATIENT  Once      04/12/19 0440        Diet: renal/cardiac/DM/fluid restricted  Dispo: per Cardiology & Nephrology recs  Discharge Needs: HD chair, PTOT recs pending    eHdy Rapp MD  Hospital Medicine Staff  [facility]

## 2019-04-21 NOTE — PLAN OF CARE
Problem: Physical Therapy Goal  Goal: Physical Therapy Goal  Goals to be met by: 19     Patient will increase functional independence with mobility by performin. Supine to sit with Modified [place]  2. Sit to stand transfer with Supervision  3. Bed to chair transfer with Stand-by Assistance using Rolling Walker  4. Gait  x 50 feet with Stand-by Assistance using Rolling Walker.     Outcome: Ongoing (interventions implemented as appropriate)  PT eval completed. Pt will begin PT POC.    Brook Donnelly, PT  2019

## 2019-04-21 NOTE — PROGRESS NOTES
[facility] Hospital Medicine  Progress Note    Primary Team: [facility] HOSP MED C  Admit Date: 2019    Subjective:        Interval History:  No complaints or AEON. Tolerated HD yesterday with 2.6 L removed    ROS:  General: no fever, no chills, no weight loss, no fatigue  Cardiovascular: no chest pain, no orthopnea, no dyspnea  Respiratory: no cough, no wheezes, no SOB  All other systems reviewed & are negative.     Objective:   Last 24 Hour Vital Signs:  BP  Min: 99/52  Max: 168/90  Temp  Av °F (36.7 °C)  Min: 96.1 °F (35.6 °C)  Max: 98.5 °F (36.9 °C)  Pulse  Av.7  Min: 87  Max: 110  Resp  Av.2  Min: 16  Max: 18  SpO2  Av %  Min: 96 %  Max: 98 %  I/O last 3 completed shifts:  In: 1760 [P.O.:1160; Other:600]  Out: 2600 [Other:2600]    Physical Examination:  GEN: AAOx3, NAD  HEENT: NCAT, MMM, PERRL, EOMI, oropharynx clear  CV: RRR, no m/r, no S3/S4  RESP: no wheezes, mild bibasilar crackles, no increased WOB  ABD: soft, NTND, normoactive BS, no organomegaly  EXTR: no c/c/e, intact distal pulses x 4  NEURO: PERRL, EOMI, moving all four extremities, intact sensation to light touch, no focal deficits  SKIN: no rashes, lesions, or color changes  PSYCH: normal affect    Laboratory:  I have reviewed all pertinent lab results/findings within the past 24 hours.    Radiology:  I have reviewed all pertinent imaging results/findings within the past 24 hours.    Current Medications:     Infusions:       Scheduled:   aspirin  81 mg Oral Daily    atorvastatin  40 mg Oral QHS    calcitRIOL  0.5 mcg Oral Daily    clopidogrel  75 mg Oral Daily    gabapentin  600 mg Oral TID    heparin (porcine)  5,000 Units Subcutaneous Q12H    irbesartan  75 mg Oral Daily    metoprolol succinate  25 mg Oral Daily    pantoprazole  40 mg Oral Daily        PRN:  sodium chloride 0.9%, albuterol-ipratropium, benzonatate, dextrose 50%, dextrose 50%, glucagon (human recombinant), glucose, glucose, heparin  (porcine), heparin (porcine), heparin (porcine), insulin aspart U-100, loperamide, sodium chloride 0.9%      Assessment/Plan:     Jenni Todd is a 48 F with ESRD who presented with sepsis and found to have NSTEMI with 3 vessel CAD and HFrEF. S/p treatment for sepsis unknown etiology, not a candidate for revascularization being managed medically.           NSTEMI (non-ST elevated myocardial infarction)  -s/p ACS protocol (DAPT, statin, heparin drip stopped)   -NSTEMI with troponin up to 16, [facility] with 3 vessel disease 99% LAD, 80%LCX, 100% RCA, LVEDP in 30s, hypotensive, IABP placed. CT surgery consulted for CABG Eval, not a candidate due to comorbidities.   -HTS consult as outpatient to discuss advanced options        Acute systolic heart failure  Not decompensated. Balloon pump removed.   -LV EF of 25%, GDMT initiated with small doses of ACEI and BB. Will uptitrate during hospitalization.   Not a candidate for revascularization, will need consultation with HTS for advanced options as an outpatient.   Cont HD for volume removal  BNP in AM          ESRD (end stage renal disease) on dialysis  -Previously PD patient; admitted 2/21 at [facility] for peritonitis, had PD catheter removed for recurrent peritonitis & transitioned to HD. Bilateral IJ tunneled catheters were attempted without success due to thrombus. Now has a temporary dialysis catheter in her left fem that was placed 3/28.  Prior to permacath placement, she had a next day graft placed by Dr. Leger on 3/22, but it was never used and was clotted 1 day post-op.  Patient refused to have the graft declotted, so the permacatch was placed at that time.  -Nephrology following. Received SLED in ICU. F/U recs re: HD  -Will have SW work on HD chair       Anemia in chronic kidney disease, on chronic dialysis  Epo per nephrology       Blood lost during dialysis due to open valve.   2 units PRBCs transfused this admission.   H/H stable    Pressure injury of sacral  region, stage 2  Wound care consulted    PPX:   VTE Risk Mitigation (From admission, onward)        Ordered     heparin (porcine) injection 1,000 Units  As needed (PRN)      04/20/19 1451     heparin (porcine) injection 2,500 Units  As needed (PRN)      04/20/19 1451     heparin (porcine) injection 1,000 Units  As needed (PRN)      04/20/19 1451     heparin (porcine) injection 5,000 Units  Every 12 hours      04/19/19 1619     IP VTE HIGH RISK PATIENT  Once      04/12/19 0440        Diet: renal/cardiac/DM/fluid restricted  Dispo: pending HD chair/SNF placement      Hedy Rapp MD  Hospital Medicine Staff  [facility]

## 2019-04-21 NOTE — CARE UPDATE
Rapid Response Nurse Chart Check     Chart check completed, abnormal VS noted, bedside RNKandy at beside, no concerns   verbalized at this time, instructed to call [phone] for further concerns or assistance.

## 2019-04-21 NOTE — PLAN OF CARE
Problem: Adult Inpatient Plan of Care  Goal: Plan of Care Review  Plan of care discussed with patient. Patient is free of fall/trauma/injury. Denies CP, SOB, or pain/discomfort. Ambulation 1 to 2 person assistance.  VS table and no acute distress.  Abd dressing CDI.  All questions addressed. Will continue to monitor.

## 2019-04-21 NOTE — PLAN OF CARE
Problem: Adult Inpatient Plan of Care  Goal: Plan of Care Review  Outcome: Ongoing (interventions implemented as appropriate)  Pt sitting in chair. AAOx4. Denies any pain. SR on monitor. VS stable. Lisinopril dcd. Pt cont with cough. Renal function stable. Dialysis tue/thur/sat. Will cont to monitor pt.

## 2019-04-21 NOTE — PT/OT/SLP EVAL
Physical Therapy Evaluation    Patient Name:  Jenni Todd   MRN:  [id number]    Recommendations:     Discharge Recommendations:  nursing facility, skilled (may progress to home w/ home health)  Discharge Equipment Recommendations: commode   Barriers to discharge: Decreased caregiver support (step daughter is unable to provide assistance)    Assessment:     Jenni Todd is a 48 y.o. female admitted with a medical diagnosis of Cardiogenic shock.  She presents with the following impairments/functional limitations:  weakness, impaired endurance, impaired self care skills, impaired functional mobilty, gait instability, impaired balance, impaired sensation, impaired cardiopulmonary response to activity . Pt senait session well w/ good participation. She is most limited by decreased endurance and neuropathy pain. She is appropriate for acute PT and will begin PT POC.    Rehab Prognosis: Good; patient would benefit from acute skilled PT services to address these deficits and reach maximum level of function.    Recent Surgery: Procedure(s) (LRB):  Left heart cath (Left)  INSERTION, INTRA-AORTIC BALLOON PUMP  ANGIOGRAM, CORONARY ARTERY (N/A) 6 Days Post-Op    Plan:     During this hospitalization, patient to be seen 4 x/week to address the identified rehab impairments via gait training, therapeutic activities, therapeutic exercises and progress toward the following goals:    · Plan of Care Expires:  05/20/19    Subjective     Chief Complaint: weakness  Patient/Family Comments/goals: return to PLOF  Pain/Comfort:  · Pain Rating 1: 0/10    Patients cultural, spiritual, Jewish conflicts given the current situation: no    Living Environment:  Pt lives w/ her step daughter in a 1SH w/ 1 ИВАН. She has a walk-in shower w/ a built in shower chair.  Prior to admission, patients level of function was Oziel w/ use of rollator for household distances. Pt would use manual w/c or elecatric scooter for community mobility. Pt  worked full time at the [employer].  Equipment used at home: rollator, wheelchair, shower chair(motorized scooter).  DME owned (not currently used): none.  Upon discharge, patient will have assistance from her step daughter but only intermittently- her step daughter is in school and works.    Objective:     Communicated with nursing prior to session.  Patient found supine with telemetry  upon PT entry to room.    General Precautions: Standard, fall   Orthopedic Precautions:N/A   Braces: N/A     Exams:  · Cognitive Exam:  Patient is oriented to Person, Place, Time and Situation  · Gross Motor Coordination:  WFL  · Postural Exam:  Patient presented with the following abnormalities:    · -       Rounded shoulders  · -       Forward head  · Sensation:    · -       Impaired  light/touch hands and feet  · RUE ROM: WFL  · RUE Strength: WFL  · LUE ROM: WFL  · LUE Strength: WFL  · RLE ROM: WFL  · RLE Strength: WFL except HF 2+/5  · LLE ROM: WFL  · LLE Strength: WFL except HF 2+/5    Functional Mobility:  · Bed Mobility:    · Supine>sit on bed w/ SBA, HOB elevated and w/ side rail  · inc'd time and cues required  · Transfers:   · Sit>stand from EOB w/ RW and Parish to rise  · Stand pivot to bedside chair w/ RW and Min/CGA for stability  · Gait:   · ~4 steps to bedside chair w/ RW and Min/CGA for stability  · Limited in distance by fatigue/weakness  · Balance:   · Static sit EOB w/ SBA  · Static stand w/ RW and CGA, ~1min       Therapeutic Activities and Exercises:   Pt instructed to complete BLE therex x20 reps (HF, LAQ, AP)  Pt was also educated on PT role and POC. Pt verb understanding.    AM-PAC 6 CLICK MOBILITY  Total Score:14     Patient left up in chair with all lines intact and call button in reach.    GOALS:   Multidisciplinary Problems     Physical Therapy Goals        Problem: Physical Therapy Goal    Goal Priority Disciplines Outcome Goal Variances Interventions   Physical Therapy Goal     PT, PT/OT Ongoing  (interventions implemented as appropriate)     Description:  Goals to be met by: 19     Patient will increase functional independence with mobility by performin. Supine to sit with Modified Rector  2. Sit to stand transfer with Supervision  3. Bed to chair transfer with Stand-by Assistance using Rolling Walker  4. Gait  x 50 feet with Stand-by Assistance using Rolling Walker.                       History:     Past Medical History:   Diagnosis Date    Abnormal finding on Pap smear, HPV DNA positive     Anemia associated with chronic renal failure     on Epogen    Blood type B+     Bulging discs - symptomatic      CAD (coronary artery disease)     Cardiomyopathy 3/13/2019    Diabetes mellitus, type 2     ESRD (end stage renal disease) 2004    FSGS (focal segmental glomerulosclerosis)     with collapsing    Hyperlipidemia     Hypertension     Neuropathy     NSTEMI (non-ST elevated myocardial infarction) 3/29/2019    Obesity     Secondary hyperparathyroidism, renal     TIA (transient ischemic attack)     Uterine fibroid     small uterine        Past Surgical History:   Procedure Laterality Date    ANGIOGRAM, CORONARY ARTERY N/A 4/15/2019    Performed by [facility] at [facility] CATH LAB    CARDIAC CATHETERIZATION      PCI x 2     SECTION, CLASSIC      COLONOSCOPY N/A 2018    Performed by Rodrigue Russell MD at [facility] ENDO (2ND FLR)    DEBRIDEMENT-WOUND Left 2016    Performed by [facility]ressa Maddox MD at [facility] OR    DIALYSIS FISTULA CREATION      multiple fistulas and grafts before PD     EGD (ESOPHAGOGASTRODUODENOSCOPY) N/A 3/14/2019    Performed by [facility]ila [facility] at [facility] ENDO    EGD (ESOPHAGOGASTRODUODENOSCOPY) N/A 3/13/2019    Performed by Adolph [facility]ila MD at [facility] ENDO    INCISION AND DRAINAGE (I&D), LABIAL N/A 2016    Performed by Harmony Fernandez MD at [facility] OR    INCISION AND DRAINAGE (I&D), LABIAL (ADD ON) Left 2016     Performed by [facility]ox [facility] at [facility] OR    INCISION AND DRAINAGE OF WOUND Left 2016    VULVAR ABCESS WITH NECROSIS    Insertion, Catheter, Central Venous, Hemodialysis N/A 3/28/2019    Performed by [facility]o Weeks MD at [facility] CATH LAB    Insertion, Catheter, Central Venous, Hemodialysis N/A 3/18/2019    Performed by Kimo Weeks MD at [facility] CATH LAB    INSERTION, CATHETER, TUNNELED ABORTED Left 3/14/2019    Performed by [facility] Solis [facility] at [facility] OR    INSERTION, GRAFT, ARTERIOVENOUS, RIGHT ARM Right 3/22/2019    Performed by BESSIE Wynn III, MD at [facility] OR 2ND FLR    INSERTION, INTRA-AORTIC BALLOON PUMP  4/15/2019    Performed by Roland Napier MD at [facility] CATH LAB    Left heart cath Left 4/15/2019    Performed by Roland Napier MD at [facility] CATH LAB    ORIF, HIP Left 9/13/2018    Performed by Tevin Grullon MD at [facility] OR    PERITONEAL CATHETER INSERTION      PERMCATH REWIRE- TUNNELED CATH REWIRE Left 11/13/2017    Performed by Baldev Munroe MD at [facility] CATH LAB    PERMCATH REWIRE- TUNNELED CATH REWIRE N/A 10/5/2017    Performed by Baldev Munroe MD at [facility] CATH LAB    REMOVAL, CATHETER, DIALYSIS, PERITONEAL N/A 3/14/2019    Performed by [facility] Solis MD at [facility] OR    UMBILICAL HERNIA REPAIR      Venogram, possible intervention Right 3/20/2019    Performed by BESSIE Wynn III, MD at [facility] CATH LAB       Time Tracking:     PT Received On: 04/21/19  PT Start Time: 0750     PT Stop Time: 0811  PT Total Time (min): 21 min     Billable Minutes: Evaluation 11, Therapeutic Activity 10 and Total Time 21      Brook Donnelly, PT  04/21/2019

## 2019-04-22 PROBLEM — R65.21 SEPTIC SHOCK: Status: ACTIVE | Noted: 2019-01-01

## 2019-04-22 PROBLEM — A41.9 SEPTIC SHOCK: Status: ACTIVE | Noted: 2019-01-01

## 2019-04-22 NOTE — PROGRESS NOTES
"[facility]  Adult Nutrition  Progress Note    SUMMARY       Recommendations  Recommendation/Intervention:   1. Encourage continued good PO intake as tolerated.   2. If PO intake decreases, recommend adding Novasource Renal OS to all meals.   RD to monitor.    Goals: Patient to consume > 75% of meals  Nutrition Goal Status: new  Communication of RD Recs: (POC)    Reason for Assessment  Reason For Assessment: length of stay  Diagnosis: cardiac disease(CAD)  Relevant Medical History: CAD, CHF, DM2, ESRD, HLD, HTN  General Information Comments: Patient with good PO intake. (Patient appears nourished, currently with no physical signs of malnutrition, and no weight loss PTA - some weight fluctuations due to fluid.)  Nutrition Discharge Planning: Adequate nutrition via PO intake.    Nutrition Risk Screen  Nutrition Risk Screen: no indicators present    Nutrition/Diet History  Spiritual, Cultural Beliefs, Jewish Practices, Values that Affect Care: no  Factors Affecting Nutritional Intake: None identified at this time    Anthropometrics  Temp: 98.1 °F (36.7 °C)  Height Method: Stated  Height: 5' 7" (170.2 cm)  Height (inches): 67 in  Weight Method: Bed Scale  Weight: 97.1 kg (214 lb 1.1 oz)  Weight (lb): 214.07 lb  Ideal Body Weight (IBW), Female: 135 lb  % Ideal Body Weight, Female (lb): 158.57 lb  BMI (Calculated): 33.6  BMI Grade: 30 - 34.9- obesity - grade I    Lab/Procedures/Meds  Pertinent Labs Reviewed: reviewed  Pertinent Labs Comments: Creat 3.6, Glu 134, POCT Glu 139-206, HgbA1c 6.1, Alb 1.8  Pertinent Medications Reviewed: reviewed  Pertinent Medications Comments: pantoprazole    Estimated/Assessed Needs  Weight Used For Calorie Calculations: 97.1 kg (214 lb 1.1 oz)  Energy Calorie Requirements (kcal): 2036 kcal/day  Energy Need Method: [place]-St Jeor(x 1.25)  Protein Requirements:  g/day(1.0-1.2 g/kg)  Weight Used For Protein Calculations: 97.1 kg (214 lb 1.1 oz)  Fluid Requirements " (mL): 1 mL/kcal or per MD  Estimated Fluid Requirement Method: RDA Method  RDA Method (mL): 2036    Nutrition Prescription Ordered  Current Diet Order: Diabetic 1500, Renal  Nutrition Order Comments: 1500mL FR    Evaluation of Received Nutrient/Fluid Intake  I/O: +4.4L since admit  Comments: LBM 4/21  % Intake of Estimated Energy Needs: 75 - 100 %  % Meal Intake: 75 - 100 %    Nutrition Risk  Level of Risk/Frequency of Follow-up: low(1x/week)     Assessment and Plan  No nutrition problem at this time.    Monitor and Evaluation  Food and Nutrient Intake: energy intake, food and beverage intake  Food and Nutrient Adminstration: diet order  Physical Activity and Function: nutrition-related ADLs and IADLs  Anthropometric Measurements: weight, weight change  Biochemical Data, Medical Tests and Procedures: electrolyte and renal panel, gastrointestinal profile, glucose/endocrine profile, inflammatory profile  Nutrition-Focused Physical Findings: overall appearance     Nutrition Follow-Up  RD Follow-up?: Yes

## 2019-04-22 NOTE — PROGRESS NOTES
[facility] Hospital Medicine  Progress Note    Primary Team: [facility]OSP MED C  Admit Date: 2019    Subjective:        Interval History:  Reports abdominal distension without pain today. Having BMs.     ROS:  General: no fever, no chills, no weight loss, no fatigue  Cardiovascular: no chest pain, no orthopnea, no dyspnea  Respiratory: no cough, no wheezes, no SOB  All other systems reviewed & are negative.     Objective:   Last 24 Hour Vital Signs:  BP  Min: 89/55  Max: 134/62  Temp  Av.4 °F (36.9 °C)  Min: 98.1 °F (36.7 °C)  Max: 98.7 °F (37.1 °C)  Pulse  Av.2  Min: 88  Max: 99  Resp  Av  Min: 16  Max: 20  SpO2  Av.3 %  Min: 91 %  Max: 99 %  I/O last 3 completed shifts:  In: 240 [P.O.:240]  Out: -     Physical Examination:  GEN: AAOx3, NAD  HEENT: NCAT, MMM, PERRL, EOMI, oropharynx clear  CV: RRR, no m/r, no S3/S4  RESP: no wheezes, bibasilar crackles, no increased WOB  ABD: soft, NT, normoactive BS, no organomegaly, Distension  EXTR: no c/c/e, intact distal pulses x 4  NEURO: PERRL, EOMI, moving all four extremities, intact sensation to light touch, no focal deficits  SKIN: no rashes, lesions, or color changes  PSYCH: normal affect    Laboratory:  I have reviewed all pertinent lab results/findings within the past 24 hours.    Radiology:  I have reviewed all pertinent imaging results/findings within the past 24 hours.    Current Medications:     Infusions:       Scheduled:   aspirin  81 mg Oral Daily    atorvastatin  40 mg Oral QHS    calcitRIOL  0.5 mcg Oral Daily    clopidogrel  75 mg Oral Daily    gabapentin  600 mg Oral TID    heparin (porcine)  5,000 Units Subcutaneous Q12H    irbesartan  75 mg Oral Daily    metoprolol succinate  25 mg Oral Daily    pantoprazole  40 mg Oral Daily        PRN:  sodium chloride 0.9%, albuterol-ipratropium, benzonatate, dextrose 50%, dextrose 50%, glucagon (human recombinant), glucose, glucose, heparin (porcine), heparin  (porcine), heparin (porcine), insulin aspart U-100, loperamide, sodium chloride 0.9%      Assessment/Plan:     Jenni Todd is a 48 F with ESRD who presented with sepsis and found to have NSTEMI with 3 vessel CAD and HFrEF. S/p treatment for sepsis unknown etiology, not a candidate for revascularization being managed medically.           NSTEMI (non-ST elevated myocardial infarction)  -s/p ACS protocol (DAPT, statin, heparin drip stopped)   -NSTEMI with troponin up to 16, [facility] with 3 vessel disease 99% LAD, 80%LCX, 100% RCA, LVEDP in 30s, hypotensive, IABP placed. CT surgery consulted for CABG Eval, not a candidate due to comorbidities.   -HTS consult as outpatient to discuss advanced options        Acute systolic heart failure  Not decompensated. Balloon pump removed.   -LV EF of 25%, GDMT initiated with small doses of ACEI and BB. Will uptitrate during hospitalization if able.   Not a candidate for revascularization, will need consultation with HTS for advanced options as an outpatient.   Cont HD for volume removal        ESRD (end stage renal disease) on dialysis  -Previously PD patient; admitted 2/21 at [facility] for peritonitis, had PD catheter removed for recurrent peritonitis & transitioned to HD. Bilateral IJ tunneled catheters were attempted without success due to thrombus. Now has a temporary dialysis catheter in her left fem that was placed 3/28.  Prior to permacath placement, she had a next day graft placed by Dr. Leger on 3/22, but it was never used and was clotted 1 day post-op.  Patient refused to have the graft declotted, so the permacatch was placed at that time.  -Nephrology following. Received SLED in ICU. Now getting HD Tues/Thurs/Sat  -Will continue outpt HD       Anemia in chronic kidney disease, on chronic dialysis  Epo per nephrology       Blood lost during dialysis due to open valve.   2 units PRBCs transfused this admission.   H/H stable    Pressure injury of sacral region, stage  2  Wound care consulted    PPX:   VTE Risk Mitigation (From admission, onward)        Ordered     heparin (porcine) injection 1,000 Units  As needed (PRN)      04/20/19 1451     heparin (porcine) injection 2,500 Units  As needed (PRN)      04/20/19 1451     heparin (porcine) injection 1,000 Units  As needed (PRN)      04/20/19 1451     heparin (porcine) injection 5,000 Units  Every 12 hours      04/19/19 1619     IP VTE HIGH RISK PATIENT  Once      04/12/19 0440        Diet: renal/cardiac/DM/fluid restricted  Dispo: pending SNF placement; needs HTS f/u      Hedy Rapp MD  Hospital Medicine Staff  [facility]

## 2019-04-22 NOTE — NURSING
Wound care follow up.       Wounds to the abdomen appear to be improving. Wound beds smaller with increased granular tissue. No odor or purulence noted.   All three wounds cleansed and repacked with aquacel ag.     Wound care to follow as needed.  Cammy Ferrara RN [facility]   [phone]

## 2019-04-22 NOTE — PLAN OF CARE
Problem: Adult Inpatient Plan of Care  Goal: Plan of Care Review  Recommendations  Recommendation/Intervention:   1. Encourage continued good PO intake as tolerated.   2. If PO intake decreases, recommend adding Novasource Renal OS to all meals.   RD to monitor.    Goals: Patient to consume > 75% of meals  Nutrition Goal Status: new    Full assessment completed, see RD Note 4/22/2019.

## 2019-04-22 NOTE — PT/OT/SLP EVAL
Occupational Therapy   Evaluation    Name: Jenni Todd  MRN: [id number]  Admitting Diagnosis:  Cardiogenic shock 7 Days Post-Op    Recommendations:     Discharge Recommendations: nursing facility, skilled(short stay)  Discharge Equipment Recommendations:  (bedside commode )  Barriers to discharge:  Decreased caregiver support    Assessment:     Jenni Todd is a 48 y.o. female with a medical diagnosis of Cardiogenic shock.  She presents with performance deficits affecting function: weakness, impaired endurance, impaired sensation, impaired functional mobilty, impaired self care skills, gait instability, impaired balance, impaired cardiopulmonary response to activity.  Pt tolerated session well and highly motivated to participate in therapy. Pt is not at PLOF with mobility and self-care tasks and has limited assistance at home. Pt will continue to benefit from skilled OT in order to address the previously stated deficits.      Rehab Prognosis: Good; patient would benefit from acute skilled OT services to address these deficits and reach maximum level of function.       Plan:     Patient to be seen 3 x/week to address the above listed problems via self-care/home management, therapeutic activities, therapeutic exercises  · Plan of Care Expires: 05/21/19  · Plan of Care Reviewed with: patient, friend    Subjective     Chief Complaint: weakness, limited mobility   Patient/Family Comments/goals: to get better and return home     Occupational Profile:  Living Environment: Pt reports she lives with her step-daughter (20y/o) in a [facility] with [address] [facility] [place]. Pt has a walk-in shower with a built-in shower chair and grab bars. 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   Previous level of function: PTA, pt was (I) with ADLs. PTA, pt was mod (I) for household distances using rollator, w/c for community outings with family, and electric scooter for mobility at work.   Roles and Routines: caretaker to self, works     Equipment Used at Home:  rollator, wheelchair, shower chair(motorized scooter )  Assistance upon Discharge: Pt will have limited assistance at home. Pt's step-daughter is in school and works.     Pain/Comfort:  · Pain Rating 1: 0/10  · Pain Rating Post-Intervention 1: 0/10    Patients cultural, spiritual, Pentec[employer]al conflicts given the current situation: no    Objective:     Communicated with: RN prior to session.  Patient found HOB elevated with telemetry upon OT entry to room. Pt agreeable to therapy session. Pt's friend present during therapy session.     General Precautions: Standard, fall   Orthopedic Precautions:N/A   Braces: N/A     Occupational Performance:    Bed Mobility:    · Patient completed Supine to Sit with minimum assistance with HOB elevated     Functional Mobility/Transfers:  · Patient completed Sit <> Stand Transfer with contact guard assistance from elevated height with  rolling walker   · Functional Mobility: Pt engaged in functional mobility within pt's room (~20ft) with CGA using RW in preparation for performing household/community mobility.   · Pt with slow pacing and slight fatigue noted.   · Distance limited by fatigue and SOB     Activities of Daily Living:  · Upper Body Dressing: minimum assistance to don gown like robe while seated EOB   · Lower Body Dressing: maximal assistance donning socks while seated EOB     Cognitive/Visual Perceptual:  Cognitive/Psychosocial Skills:     -       Oriented to: Person, Place, Time and Situation   -       Follows Commands/attention:Follows multistep  commands  -       Communication: clear/fluent  -       Memory: No Deficits noted  -       Safety awareness/insight to disability: intact   -       Mood/Affect/Coping skills/emotional control: Appropriate to situation  Visual/Perceptual:      -Intact      Physical Exam:  Balance:    - Static sit: good  -  Dynamic sit: good  - Static standing: fair   - Dynamic Standing: fair       Postural  examination/scapula alignment:    -       No postural abnormalities identified  Skin integrity: Visible skin intact  Edema:  Mild BUEs  Sensation:    -       Intact  light/touch BUEs  Dominant hand:    -       right  Upper Extremity Range of Motion:     -       Right Upper Extremity: WFL  -       Left Upper Extremity: WFL  Upper Extremity Strength:    -       Right Upper Extremity: grossly 4/5   -       Left Upper Extremity: grossly 4/5     AMPAC 6 Click ADL:  AMPAC Total Score: 15    Treatment & Education:  Pt educated by therapist on:   - Pt educated on role of OT, POC, and goals for therapy.    - Educated pt on being appropriate to transfer with nsg and PCT with min A x 1 person using RW  - RW provided to pt during hospitalization in order to promote functional mobility with assistance from a staff member  - Time provided for therapeutic counseling and discussion of health disposition.   - Importance of OOB ax's with staff member assistance and sitting OOB majority of day.   - Pt completed ADLs and functional mobility for treatment session as noted above   - Pt verbalized understanding. Pt expressed no further concerns/questions.  - whiteboard updated   Education:    Patient left sitting EOB as pt was about to receive a sponge bath with all lines intact, call button in reach, RN notified and friend  present    GOALS:   Multidisciplinary Problems     Occupational Therapy Goals        Problem: Occupational Therapy Goal    Goal Priority Disciplines Outcome Interventions   Occupational Therapy Goal     OT, PT/OT Ongoing (interventions implemented as appropriate)    Description:  Goals to be met by: 5/6/19     Patient will increase functional independence with ADLs by performing:      UE Dressing with Supervision.  LE Dressing with Set-up Assistance.  Grooming while standing at sink with Supervision.  Toileting from toilet with Supervision for hygiene and clothing management.   Toilet transfer to toilet with  Supervision.                    History:     Past Medical History:   Diagnosis Date    Abnormal finding on Pap smear, HPV DNA positive     Anemia associated with chronic renal failure     on Epogen    Blood type B+     Bulging discs - symptomatic      CAD (coronary artery disease)     Cardiomyopathy 3/13/2019    Diabetes mellitus, type 2     ESRD (end stage renal disease) 2004    FSGS (focal segmental glomerulosclerosis)     with collapsing    Hyperlipidemia     Hypertension     Neuropathy     NSTEMI (non-ST elevated myocardial infarction) 3/29/2019    Obesity     Secondary hyperparathyroidism, renal     TIA (transient ischemic attack)     Uterine fibroid     small uterine        Past Surgical History:   Procedure Laterality Date    ANGIOGRAM, CORONARY ARTERY N/A 4/15/2019    Performed by Roland [facility]ier [facility] at [facility] CATH LAB    CARDIAC CATHETERIZATION      PCI x 2     SECTION, CLASSIC      COLONOSCOPY N/A 2018    Performed by Rodrigue Russell MD at [facility] ENDO (2ND FLR)    DEBRIDEMENT-WOUND Left 2016    Performed by [facility]ressa Maddox MD at [facility] OR    DIALYSIS FISTULA CREATION      multiple fistulas and grafts before PD     EGD (ESOPHAGOGASTRODUODENOSCOPY) N/A 3/14/2019    Performed by [facility]olph [facility]ila [facility] at [facility] ENDO    EGD (ESOPHAGOGASTRODUODENOSCOPY) N/A 3/13/2019    Performed by [facility]olph [facility]ila [facility] at [facility] ENDO    INCISION AND DRAINAGE (I&D), LABIAL N/A 2016    Performed by Harmony Fernandez MD at [facility] OR    INCISION AND DRAINAGE (I&D), LABIAL (ADD ON) Left 2016    Performed by Teressa Maddox MD at [facility] OR    INCISION AND DRAINAGE OF WOUND Left     VULVAR ABCESS WITH NECROSIS    Insertion, Catheter, Central Venous, Hemodialysis N/A 3/28/2019    Performed by Kimo Weeks MD at [facility] CATH LAB    Insertion, Catheter, Central Venous, Hemodialysis N/A 3/18/2019    Performed by Kimo Weeks MD at [facility] CATH LAB    INSERTION,  CATHETER, TUNNELED ABORTED Left 3/14/2019    [facility] by [facility]is [facility] at [facility] OR    INSERTION, GRAFT, ARTERIOVENOUS, RIGHT ARM Right 3/22/2019    Performed by BESSIE Wynn III, MD at [facility] OR 2ND FLR    INSERTION, INTRA-AORTIC BALLOON PUMP  4/15/2019    Performed by Roland Napier MD at [facility] CATH LAB    Left heart cath Left 4/15/2019    Performed by Roland Napier MD at [facility] CATH LAB    ORIF, HIP Left 9/13/2018    Performed by Tevin Grullon MD at [facility] OR    PERITONEAL CATHETER INSERTION      PERMCATH REWIRE- TUNNELED CATH REWIRE Left 11/13/2017    Performed by Baldev Munroe MD at [facility] CATH LAB    PERMCATH REWIRE- TUNNELED CATH REWIRE N/A 10/5/2017    Performed by Baldev Munroe MD at [facility] CATH LAB    REMOVAL, CATHETER, DIALYSIS, PERITONEAL N/A 3/14/2019    Performed by Serv[facility] Solis MD at [facility] OR    UMBILICAL HERNIA REPAIR      Venogram, possible intervention Right 3/20/2019    Performed by BESSIE Wynn III, MD at [facility] CATH LAB       Time Tracking:     OT Date of Treatment: 04/22/19  OT Start Time: 0926  OT Stop Time: 0944  OT Total Time (min): 18 min    Billable Minutes:Evaluation 18    Shana Gonzalez, OT  4/22/2019

## 2019-04-22 NOTE — PLAN OF CARE
04/22/19 0828   Discharge Reassessment   Assessment Type Discharge Planning Reassessment   Do you have any problems affording any of your prescribed medications? TBD   Discharge Plan A Skilled Nursing Facility   Discharge Plan B Home with family;Home Health

## 2019-04-22 NOTE — PLAN OF CARE
Referral sent back to [facility] skilled care for snf.   contacted [facility] [place] to resume dialysis.  pts chair time is T, TH, S @ 10:15.  Will approval per snf.     04/22/19 1006   Post-Acute Status   Post-Acute Authorization Placement

## 2019-04-22 NOTE — DISCHARGE SUMMARY
[facility] Hospital Medicine  Discharge Summary      Patient Name: Jenni Todd  MRN: [id number]  Admission Date: 4/12/2019  Hospital Length of Stay: 10 days  Discharge Date and Time: No discharge date for patient encounter.  Attending Physician: Hedy Rapp MD   Discharging Provider: Hedy Rapp MD  Primary Care Provider: Michael Tan Iii, MD  Hospital Medicine Team: [facility] MED  Hedy Rapp MD    HPI:   48 F with long standing ESRD, previously on PD and now with tunneled femoral catheter in place presented with septic shock of unknown etiology, found to have NSTEMI with new HFrEF. Managed medically.        Procedure(s) (LRB):  Left heart cath (Left)  INSERTION, INTRA-AORTIC BALLOON PUMP  ANGIOGRAM, CORONARY ARTERY (N/A)      Hospital Course:   NSTEMI (non-ST elevated myocardial infarction)  -s/p ACS protocol (DAPT, statin, heparin drip stopped)   -NSTEMI with troponin up to 16, [facility] with 3 vessel disease 99% LAD, 80%LCX, 100% RCA, LVEDP in 30s, hypotensive, IABP placed. CT surgery consulted for CABG Eval, not a candidate due to comorbidities.   -HTS consult as outpatient to discuss advanced options        Acute systolic heart failure  Not decompensated. Balloon pump removed.   -LV EF of 25%, GDMT initiated with small doses of ACEI and BB. Uptitrate as outpatient  Not a candidate for revascularization, will need consultation with HTS for advanced options as an outpatient.   Cont HD as outpatient for volume removal        ESRD (end stage renal disease) on dialysis  -Previously PD patient; admitted 2/21 at [facility] for peritonitis, had PD catheter removed for recurrent peritonitis & transitioned to HD. Bilateral IJ tunneled catheters were attempted without success due to thrombus. Now has a temporary dialysis catheter in her left fem that was placed 3/28.  Prior to permacath placement, she had a next day graft placed by Dr. Leger on 3/22, but it was never used and was clotted 1 day  post-op.  Patient refused to have the graft declotted, so the permacatch was placed at that time.  -Nephrology following. Received SLED in ICU. Now getting HD Tues/Thurs/Sat  -Will continue outpt HD        Anemia in chronic kidney disease, on chronic dialysis  Epo per nephrology       Blood lost during dialysis due to open valve.   2 units PRBCs transfused this admission.   H/H stable     Pressure injury of sacral region, stage 2  Wound care consulted         Consults:   Consults (From admission, onward)        Status Ordering Provider     Inpatient consult to Cardiology  Once     Provider:  (Not yet assigned)    Completed CANDI PATEL     Inpatient consult to Cardiothoracic Surgery  Once     Provider:  (Not yet assigned)    Completed JEYSON HUYNH     Inpatient consult to Critical Care Medicine  Once     Provider:  (Not yet assigned)    Completed ANN MARIE CHRISTOPHER     Inpatient consult to Interventional Cardiology  Once     Provider:  (Not yet assigned)    Completed TIMME, HOMERO O     Inpatient consult to Midline team  Once     Provider:  (Not yet assigned)    Completed DESIRAE MARIE     Inpatient consult to Nephrology  Once     Provider:  (Not yet assigned)    Completed TIMME, HOMERO O     Inpatient consult to PICC team (hospitals)  Once     Provider:  (Not yet assigned)    Completed OSITO STARK     Inpatient consult to [facility]  Once     Provider:  (Not yet assigned)    Acknowledged YARA NESBITT            Final Active Diagnoses:    Diagnosis Date Noted POA    Multiple wounds [T07.XXXA] 04/15/2019 Yes    Pressure injury of sacral region, stage 2 [L89.152] 04/15/2019 Yes    3-vessel coronary artery disease [I25.10] 04/15/2019 Yes    NSTEMI (non-ST elevated myocardial infarction) [I21.4] 03/29/2019 Yes    Acute systolic heart failure [I50.21] 03/18/2019 Yes    Acute blood loss anemia [D62] 09/23/2018 Yes    ESRD (end stage renal disease) on dialysis [N18.6, Z99.2]  09/12/2018 Not Applicable    CAD (coronary artery disease) [I25.10]  Yes    Anemia in chronic kidney disease, on chronic dialysis [N18.6, D63.1, Z99.2]  Not Applicable      Problems Resolved During this Admission:    Diagnosis Date Noted Date Resolved POA    PRINCIPAL PROBLEM:  Cardiogenic shock [R57.0] 04/15/2019 04/19/2019 Yes    Septic shock [A41.9, R65.21] 04/12/2019 04/19/2019 Yes       Discharged Condition: fair    Disposition: Skilled Nursing Facility    Follow Up:  Follow-up Information     Schedule an appointment as soon as possible for a visit with Michael Tan Iii, MD.    Specialty:  Family Medicine  Contact information:  [address]  [place] [address]  [place] [id number] [phone]                 Patient Instructions:      Ambulatory referral to Congestive Heart Failure Clinic   Referral Priority: Routine Referral Type: Consultation   Referral Reason: Specialty Services Required   Referred to Provider: CHRIS ALMEIDA Requested Specialty: Cardiology   Number of Visits Requested: 1     Ambulatory Referral to Transplant, Heart   Referral Priority: Routine Referral Type: Transplants   Number of Visits Requested: 1     Diet diabetic     Diet Cardiac   Order Comments: Do not consume more than 2 liters fluid daily or 2 grams of sodium daily     Diet renal     Notify your health care provider if you experience any of the following:  temperature >100.4     Notify your health care provider if you experience any of the following:  severe uncontrolled pain     Notify your health care provider if you experience any of the following:  difficulty breathing or increased cough     Notify your health care provider if you experience any of the following:  persistent dizziness, light-headedness, or visual disturbances     Notify your health care provider if you experience any of the following:  worsening rash     Notify your health care provider if you experience any of the following:  severe persistent headache      Notify your health care provider if you experience any of the following:  redness, tenderness, or signs of infection (pain, swelling, redness, odor or green/yellow discharge around incision site)     Notify your health care provider if you experience any of the following:  persistent nausea and vomiting or diarrhea     Notify your health care provider if you experience any of the following:  increased confusion or weakness       Significant Diagnostic Studies: Labs:   CMP   Recent Labs   Lab 04/21/19  0420 04/22/19  0520 04/22/19  0902    138 137   K 3.8 4.1 4.1    105 103   CO2 27 22* 24   * 130* 152*   BUN 10 16 16   CREATININE 3.6* 5.0* 5.3*   CALCIUM 8.9 9.3 9.3   ALBUMIN 1.8* 1.8*  --    ANIONGAP 7* 11 10   ESTGFRAFRICA 16.2* 10.9* 10.2*   EGFRNONAA 14.1* 9.5* 8.8*    and CBC   Recent Labs   Lab 04/21/19  0420 04/22/19  0902   WBC 11.24 10.61   HGB 7.8* 8.5*   HCT 26.3* 28.9*    343       Pending Diagnostic Studies:     Procedure Component Value Units Date/Time    Magnesium [[id number]] Collected:  04/13/19 1441    Order Status:  Sent Lab Status:  In process Updated:  04/13/19 1441    Specimen:  Blood     Protime-INR [[id number]] Collected:  04/12/19 1100    Order Status:  Sent Lab Status:  In process Updated:  04/12/19 1100    Specimen:  Blood     Renal function panel [[id number]] Collected:  04/13/19 1441    Order Status:  Sent Lab Status:  In process Updated:  04/13/19 1442    Specimen:  Blood          Medications:  Reconciled Home Medications:      Medication List      START taking these medications    clopidogrel 75 mg tablet  Commonly known as:  PLAVIX  Take 1 tablet (75 mg total) by mouth once daily.  Start taking on:  4/23/2019     irbesartan 75 MG tablet  Commonly known as:  AVAPRO  Take 1 tablet (75 mg total) by mouth once daily.  Start taking on:  4/23/2019     metoprolol succinate 25 MG 24 hr tablet  Commonly known as:  TOPROL-XL  Take 1 tablet (25 mg total) by mouth once  daily.  Start taking on:  4/23/2019        CHANGE how you take these medications    insulin detemir U-100 100 unit/mL (3 mL) Inpn pen  Commonly known as:  LEVEMIR FLEXTOUCH  Inject 5 Units into the skin every evening.  What changed:  how much to take        CONTINUE taking these medications    aspirin 81 MG EC tablet  Commonly known as:  ECOTRIN  Take 1 tablet (81 mg total) by mouth once daily.     atorvastatin 40 MG tablet  Commonly known as:  LIPITOR  Take 40 mg by mouth every evening.     calcitRIOL 0.25 MCG Cap  Commonly known as:  ROCALTROL  Take 0.5 mcg by mouth once daily.     cinacalcet 90 MG Tab  Commonly known as:  SENSIPAR  Take 90 mg by mouth once daily.     epoetin adonay 10,000 unit/mL Soln 1 mL, epoetin adonay 20,000 unit/mL Soln 1 mL  Inject 30,000 Units into the vein every 14 (fourteen) days.     gabapentin 100 MG capsule  Commonly known as:  NEURONTIN  1 capsule 3 (three) times daily.     NEPHROCAPS 1 mg Cap  Generic drug:  vitamin renal formula (B-complex-vitamin c-folic acid)  Take 1 capsule by mouth once daily. 1 Capsule Oral Every day     ONETOUCH VERIO Strp  Generic drug:  blood sugar diagnostic  USE 1 STRIP ONCE DAILY IN VITRO     pantoprazole 40 MG tablet  Commonly known as:  PROTONIX  Take 1 tablet (40 mg total) by mouth once daily.     sevelamer carbonate 800 mg Tab  Commonly known as:  RENVELA  Take 1 tablet (800 mg total) by mouth 3 (three) times daily with meals.     STARLIX 120 MG tablet  Generic drug:  nateglinide  STARLIX 120MG 30 MINUTES BEFORE EACH MEAL.        STOP taking these medications    amLODIPine 10 MG tablet  Commonly known as:  NORVASC            Indwelling Lines/Drains at time of discharge:   Lines/Drains/Airways     Central Venous Catheter Line                 Hemodialysis Catheter 03/28/19 1528 left femoral 25 days          Drain                 Hemodialysis AV Fistula Left upper arm -- days         Hemodialysis AV Graft Right upper arm -- days          Pressure Ulcer                  Pressure Injury 03/27/19 Right Buttocks Stage 2 26 days                Time spent on the discharge of patient: 29 minutes  Patient was seen and examined on the date of discharge and determined to be suitable for discharge.         Hedy Rapp MD  Department of Hospital Medicine  [facility]

## 2019-04-22 NOTE — HOSPITAL COURSE
NSTEMI (non-ST elevated myocardial infarction)  -s/p ACS protocol (DAPT, statin, heparin drip stopped)   -NSTEMI with troponin up to 16, [facility] with 3 vessel disease 99% LAD, 80%LCX, 100% RCA, LVEDP in 30s, hypotensive, IABP placed. CT surgery consulted for CABG Eval, not a candidate due to comorbidities.   -HTS consult as outpatient to discuss advanced options        Acute systolic heart failure  Not decompensated. Balloon pump removed.   -LV EF of 25%, GDMT initiated with small doses of ACEI and BB. Uptitrate as outpatient  Not a candidate for revascularization, will need consultation with HTS for advanced options as an outpatient.   Cont HD as outpatient for volume removal        ESRD (end stage renal disease) on dialysis  -Previously PD patient; admitted 2/21 at [facility] for peritonitis, had PD catheter removed for recurrent peritonitis & transitioned to HD. Bilateral IJ tunneled catheters were attempted without success due to thrombus. Now has a temporary dialysis catheter in her left fem that was placed 3/28.  Prior to permacath placement, she had a next day graft placed by Dr. Leger on 3/22, but it was never used and was clotted 1 day post-op.  Patient refused to have the graft declotted, so the permacatch was placed at that time.  -Nephrology following. Received SLED in ICU. Now getting HD Tues/Thurs/Sat  -Will continue outpt HD        Anemia in chronic kidney disease, on chronic dialysis  Epo per nephrology       Blood lost during dialysis due to open valve.   2 units PRBCs transfused this admission.   H/H stable     Pressure injury of sacral region, stage 2  Wound care consulted

## 2019-04-22 NOTE — PLAN OF CARE
Problem: Occupational Therapy Goal  Goal: Occupational Therapy Goal  Goals to be met by: 5/6/19     Patient will increase functional independence with ADLs by performing:      UE Dressing with Supervision.  LE Dressing with Set-up Assistance.  Grooming while standing at sink with Supervision.  Toileting from toilet with Supervision for hygiene and clothing management.   Toilet transfer to toilet with Supervision.  Outcome: Ongoing (interventions implemented as appropriate)  Initial eval completed   POC implemented and goals established     Shana Gonzalez, OTR/L  [phone]  4/22/2019

## 2019-04-22 NOTE — PLAN OF CARE
Transportation arranged with the patient flow center to  pt for 4:30pm today.  Pt will transfer to [facility] skilled unit.

## 2019-04-22 NOTE — CONSULTS
OSNF consult approved for admit to SNF today will need discharge re-admit Gen SNF orders in Epic. After orders are in can call report to [phone] can set up transportation.

## 2019-04-22 NOTE — HPI
48 F with long standing ESRD, previously on PD and now with tunneled femoral catheter in place presented with septic shock of unknown etiology, found to have NSTEMI with new HFrEF. Managed medically.

## 2019-04-22 NOTE — NURSING
Pt discharged per MD orders.  Tele discontinued and returned to station.  IV discontinued; catheter tip intact x.  Medication list and prescriptions sent with pt to [facility] skilled ; Report called to Valentina.  Pt awaiting transportation to skilled unit.  Will continue to monitor.

## 2019-04-23 NOTE — PLAN OF CARE
Problem: Fall Injury Risk  Goal: Absence of Fall and Fall-Related Injury  Outcome: Ongoing (interventions implemented as appropriate)  Pt. Had non falls at this time.will continue to monitor.

## 2019-04-23 NOTE — PLAN OF CARE
04/23/19 0656   Final Note   Assessment Type Final Discharge Note   Anticipated Discharge Disposition SNF   Discharge planning education complete? Yes

## 2019-04-23 NOTE — PT/OT/SLP EVAL
Occupational Therapy  Evaluation/tx    Jenni Todd   MRN: [id number]   Admitting Diagnosis: weakness/ cardiogenic shock    OT Date of Treatment: 19   OT Start Time: 716  OT Stop Time: 756  OT Total Time (min): 40 min    Billable Minutes:  Evaluation 10  Self Care/Home Management 30    Diagnosis: weakness/ cardiogenic shock    Past Medical History:   Diagnosis Date    Abnormal finding on Pap smear, HPV DNA positive     Anemia associated with chronic renal failure     on Epogen    Blood type B+     Bulging discs - symptomatic      CAD (coronary artery disease)     Cardiomyopathy 3/13/2019    Diabetes mellitus, type 2     ESRD (end stage renal disease) 2004    FSGS (focal segmental glomerulosclerosis)     with collapsing    Hyperlipidemia     Hypertension     Neuropathy     NSTEMI (non-ST elevated myocardial infarction) 3/29/2019    Obesity     Secondary hyperparathyroidism, renal     TIA (transient ischemic attack)     Uterine fibroid     small uterine       Past Surgical History:   Procedure Laterality Date    ANGIOGRAM, CORONARY ARTERY N/A 4/15/2019    Performed by [facility] CATH LAB    CARDIAC CATHETERIZATION      PCI x 2     SECTION, CLASSIC      COLONOSCOPY N/A 2018    Performed by Rodrigue Russell MD at [facility] ENDO (2ND FLR)    DEBRIDEMENT-WOUND Left 2016    Per[facility] by [facility]ox [facility] at [facility] OR    DIALYSIS FISTULA CREATION      multiple fistulas and grafts before PD     EGD (ESOPHAGOGASTRODUODENOSCOPY) N/A 3/14/2019    Performed by [facility]olph [facility]ila [facility] at [facility] ENDO    EGD (ESOPHAGOGASTRODUODENOSCOPY) N/A 3/13/2019    Performed by [facility]olph [facility]ila [facility] at [facility] ENDO    INCISION AND DRAINAGE (I&D), LABIAL N/A 2016    Performed by Harmony Fernandez [facility] at [facility] OR    INCISION AND DRAINAGE (I&D), LABIAL (ADD ON) Left 2016    Performed by [facility] Maddox [facility] at [facility] OR    INCISION AND DRAINAGE OF WOUND Left      VULVAR ABCESS WITH NECROSIS    Insertion, Catheter, Central Venous, Hemodialysis N/A 3/28/2019    Performed by [facility]eks [facility] at [facility] CATH LAB    Insertion, Catheter, Central Venous, Hemodialysis N/A 3/18/2019    Performed by [facility]o Weeks [facility] at [facility] CATH LAB    INSERTION, CATHETER, TUNNELED ABORTED Left 3/14/2019    Performed by Servando Solis MD at [facility] OR    INSERTION, GRAFT, ARTERIOVENOUS, RIGHT ARM Right 3/22/2019    Performed by BESSIE Wynn III, MD at [facility] OR 2ND FLR    INSERTION, INTRA-AORTIC BALLOON PUMP  4/15/2019    Performed by Roland Napier MD at [facility] CATH LAB    Left heart cath Left 4/15/2019    Performed by Roland Napier MD at [facility] CATH LAB    ORIF, HIP Left 9/13/2018    [facility]formed by [facility]on [facility] at [facility]    PERITONEAL CATHETER INSERTION      PERMCATH REWIRE- TUNNELED CATH REWIRE Left 11/13/2017    Performed by Baldev Munroe MD at [facility] CATH LAB    PERMCATH REWIRE- TUNNELED CATH REWIRE N/A 10/5/2017    Performed by [facility]roe MD at [facility] CATH LAB    REMOVAL, CATHETER, DIALYSIS, PERITONEAL N/A 3/14/2019    Performed by Servando Solis MD at [facility] OR    UMBILICAL HERNIA REPAIR      Venogram, possible intervention Right 3/20/2019    Performed by BESSIE Wynn III, MD at [facility] CATH LAB         General Precautions: Standard, fall(cardiac/fluid restriction/ HD)  Orthopedic Precautions: N/A  Braces: N/A    Spiritual, Cultural Beliefs, Anglican Practices, Values that Affect Care: no     Patient History:  Lives With: other (see comments)(step daughter)  Living Arrangements: house  Home Accessibility: stairs to enter home(1)  Home Layout: Able to live on 1st floor  Stair Railings at Home: none  Financial Concerns: none  Transportation Anticipated: family or friend will provide  Equipment Currently Used at Home: grab bar, shower chair, walker, rolling, rollator, wheelchair, power chair    Prior level of function:    Bed Mobility/Transfers:  needs device  Grooming: independent  Bathing: needs device  Upper Body Dressing: independent  Lower Body Dressing: independent  Toileting: independent  Homemaking Responsibilities: Yes   Responsibility: No  Driving License: Yes  Occupation: Full time employment  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 report she has been sick since 2-21.  She was recently at SNF and returned to main campus due to cardiac issues wwhere she had a balloon pumpx  4days.  Pt. was previously Mod I with mobility utilizing a rollator in the house and scooter at work as well as wc with family for community outings.  Pt. was able to perform ADL/IADL tasks.  She has a built in seat and grab bars in her shower.  Her 21 year old step daughter does reside with her but works and attends school.      Dominant hand: right    Subjective:  Communicated with nurse prior to session.    Chief Complaint: I am just tired and weak  Patient/Family stated goals: To be able to return to home environment at level she was previously    Pain/Comfort  Pain Rating 1: 0/10  Pain Rating Post-Intervention 1: 0/10    Objective:   Patient found with: (supine in bed)    Cognitive Exam:  Oriented to: Person, Place, Time and Situation  Follows Commands/attention: Follows multistep  commands  Communication: clear/fluent  Memory:  No Deficits noted  Safety awareness/insight to disability: intact  Coping skills/emotional control: Appropriate to situation    Visual/perceptual:  Intact    Physical Exam:  Postural examination/scapula alignment:    -       Rounded shoulders  -       Posterior pelvic tilt  Skin integrity: Visible skin intact  Edema: Moderate in abdominal region/minimal in feet    Sensation:   Reports decreased sensation continues in hands as well as feet    Upper Extremity Range of Motion:  Right Upper Extremity: WFL  Left Upper Extremity: WFL    Upper Extremity Strength:  Right  Upper Extremity: WFL  Left Upper Extremity: WFL   Strength: good    Fine motor coordination:   Able to open containers; appears to be functional    Gross motor coordination: WFL in BUE    Occupational Performance:    Bed Mobility:    · Patient completed Supine to Sit with minimum assistance     Functional Mobility/Transfers:  · Patient completed Sit <> Stand Transfer with minimum assistance  with  no assistive device   · Patient completed Bed <> Chair Transfer using Stand Pivot technique with minimum assistance with no assistive device  · Functional Mobility: Not tested    Activities of Daily Living:  · Grooming: stand by assistance seated at sink to wash face, brush hair and teeth  · Bathing: moderate assistance with sponge bath at sink  · Upper Body Dressing: stand by assistance to don shirt (pullover)  · Lower Body Dressing: moderate assistance with assist for sandals and CGA whenmanaging [ants over hips and to flatten waist band    AMPA 6 Click:  AMPA Total Score: 19    OT Exercises: not performed    Additional Treatment:  Pt. Educated on role of oT and pOC   pt. Educated on safety with transfers and ADL tasks    Patient left up in chair with call button in reach and eating breakfast    Assessment:  Jenni Todd is a 48 y.o. female with a medical diagnosis of weakness/ cardiogenic shock and presents with deficits in functional mobility, endurance, and  ADL tasks . Pt. Tolerated OT evaluation and ADL tasks well on this date.  Overall, endurance is low and requires frequent rest breaks.  Pt. Would benefit from continued OT services to maximize independence and safety with ADL/IADL tasks in home environment.     Rehab identified problem list/impairments: impaired endurance, impaired self care skills, impaired functional mobilty, impaired cardiopulmonary response to activity    Rehab potential is good    Activity tolerance: Good    Discharge recommendations: home health OT(may require S initially)      Barriers to discharge: Decreased caregiver support     Equipment recommendations: commode     GOALS:   Multidisciplinary Problems     Occupational Therapy Goals        Problem: Occupational Therapy Goal    Goal Priority Disciplines Outcome Interventions   Occupational Therapy Goal     OT, PT/OT     Description:  Goals to be met by: 05-06-19     Patient will increase functional independence with ADLs by performing:    UE Dressing with Mod I  LE Dressing with Mod I.  Grooming while seated with Mod I .  Toileting from bedside commode with Mod I for hygiene and clothing management.   Bathing from  edge of bed with SBA.  Supine to sit with Modified Sneads.  Stand pivot transfers with Mod I .  Toilet transfer to bedside commode with Mod I.  Pt. To be I with HEP for BUE to improve endurance level                      PLAN: Patient to be seen 5 x/week to address the above listed problems via self-care/home management, therapeutic activities, therapeutic exercises  Plan of Care expires: 05/23/19  Plan of Care reviewed with: patient    SABRA Mcguire  04/23/2019

## 2019-04-23 NOTE — PLAN OF CARE
Problem: Physical Therapy Goal  Goal: Physical Therapy Goal  Goals to be met by: 2019 (2 weeks)    Patient will increase functional independence with mobility by performin. Supine to sit with Modified Becker  2. Sit to supine with Modified Becker  3. Sit to stand transfer with Supervision using Rolling Walker or Rollator  4. Bed to chair transfer with Supervision using Rolling Walker or Rollator  5. Gait  x 150 feet with Supervision using Rolling Walker or Rollator  6. Wheelchair propulsion x100 feet with Supervision using bilateral upper extremities  7. Ascend/Descend 4 inch curb step with Stand-by Assistance using Rolling Walker or Rollator  8. Stand for 3 minutes with Stand-by Assistance using Rolling Walker or Rollator while performing an activity  9. Lower extremity exercise program x30 reps per handout, with assistance as needed      PT eval complete and goals set accordingly Jered Lou, SPT 2019

## 2019-04-23 NOTE — CONSULTS
"  [facility] - Skilled Nursing Care  Adult Nutrition  Consult Note    SUMMARY     Recommendations    Recommendation/Intervention: Continue 2000 diabetic diet, 1500 ml fluid restriciton, recommend dc renal restriction per pt request and due to food preferences, RD following  Goals: PO to meet 85% of EEN by next RD visit  Nutrition Goal Status: new  Communication of RD Recs: reviewed with physician(POC)    Reason for Assessment    Reason For Assessment: consult  Diagnosis: (Debility, sp septic shock)  Relevant Medical History: CAD, CHF, DM2, ESRD, HLD, HTN  Interdisciplinary Rounds: did not attend  General Information Comments: patient ordering almost the same menu items due to her food preferences, explained to patient her current renal labs   Nutrition Discharge Planning: PO to meet 85% of needs by next RD follow up.     Nutrition Risk Screen    Nutrition Risk Screen: no indicators present    Nutrition/Diet History    Patient Reported Diet/Restrictions/Preferences: diabetic diet  Food Preferences: no fish, no cooked carrots, no mixed vegetables  Spiritual, Cultural Beliefs, Holiness Practices, Values that Affect Care: no  Supplemental Drinks or Food Habits: (refuses ONS)  Food Allergies: NKFA  Factors Affecting Nutritional Intake: None identified at this time    Anthropometrics    Temp: 98.1 °F (36.7 °C)  Height Method: Stated  Height: 5' 7" (170.2 cm)  Height (inches): 67 in  Weight Method: Standard Scale  Weight: 100.6 kg (221 lb 12.5 oz)  Weight (lb): 221.78 lb  Ideal Body Weight (IBW), Female: 135 lb  % Ideal Body Weight, Female (lb): 164.28 lb  BMI (Calculated): 34.8  BMI Grade: 30 - 34.9- obesity - grade I  Weight Loss: unintentional  Usual Body Weight (UBW), k kg  % Usual Body Weight: 98.83  % Weight Change From Usual Weight: -1.37 %       Lab/Procedures/Meds    Pertinent Labs Reviewed: reviewed  Pertinent Labs Comments: PO4 4.4, K 4.1, Kct 28.9, Hg 8.5, glucose 152, HgA1x 6.1,   Pertinent Medications " Reviewed: reviewed  Pertinent Medications Comments: pantoprazole, sevelamer, gabapentin, ASA, statin, renal vitamin,     Physical Findings/Assessment 4/22/19       Estimated/Assessed Needs    Weight Used For Calorie Calculations: 100.6 kg (221 lb 12.5 oz)  Energy Calorie Requirements (kcal): 2078  Energy Need Method: Castle-St Jeor(x 1.25 (PAL))  Protein Requirements: 92g(x 1.5 x IBW kg)  Weight Used For Protein Calculations: 61.4 kg (135 lb 5.8 oz)  Fluid Requirements (mL): 1500 ml per MD     RDA Method (mL): 2078  CHO Requirement: 50% of calories      Nutrition Prescription Ordered    Current Diet Order: 2000 diabetic , renal, 1500 ml fluid restriction  Oral Nutrition Supplement: none(pt refuses)    Evaluation of Received Nutrient/Fluid Intake    Energy Calories Required: meeting needs  Protein Required: meeting needs  Fluid Required: meeting needs  Tolerance: tolerating  % Intake of Estimated Energy Needs: 50 - 75 %  % Meal Intake: 50 - 75 %    Nutrition Risk    Level of Risk/Frequency of Follow-up: low     Assessment and Plan     Increased nutrient needs(protein) related to wound healing as evidenced by surgical wounds and stage 2 pressure wound     Monitor and Evaluation    Food and Nutrient Adminstration: diet order  Anthropometric Measurements: weight change  Biochemical Data, Medical Tests and Procedures: electrolyte and renal panel, gastrointestinal profile, glucose/endocrine profile  Nutrition-Focused Physical Findings: overall appearance     Malnutrition Assessment     Skin (Micronutrient): wounds unhealed       Energy Intake (Malnutrition): less than 75% for greater than or equal to 1 month   Orbital Region (Subcutaneous Fat Loss): well nourished  Upper Arm Region (Subcutaneous Fat Loss): well nourished  Thoracic and Lumbar Region: well nourished   Congregation Region (Muscle Loss): well nourished  Clavicle Bone Region (Muscle Loss): well nourished  Scapular Bone Region (Muscle Loss): well nourished  Dorsal  Hand (Muscle Loss): well nourished                 Nutrition Follow-Up     Yes

## 2019-04-23 NOTE — PLAN OF CARE
Problem: Adult Inpatient Plan of Care  Goal: Plan of Care Review  Outcome: Ongoing (interventions implemented as appropriate)  Plan of care review with patient . Intervention documented on MAR and flow sheet. Denies c/o pain. Remain afebrile. No fall or injury noted this shift. All safery measures maintained.

## 2019-04-23 NOTE — PLAN OF CARE
Problem: Adult Inpatient Plan of Care  Goal: Plan of Care Review  Outcome: Ongoing (interventions implemented as appropriate)   Increased nutrient needs(protein) related to wound healing as evidenced by surgical wounds and stage 2 pressure wound.      Recommendation/Intervention: Continue 2000 diabetic diet, 1500 ml fluid restriciton, recommend dc renal restriction per pt request and due to food preferences, RD following  Goals: PO to meet 85% of EEN by next RD visit  Nutrition Goal Status: new

## 2019-04-23 NOTE — PT/OT/SLP EVAL
PhysicalTherapy   Evaluation    Jenni Todd   MRN: [id number]     PT Received On: 19  PT Start Time: 0830     PT Stop Time: 0912   Billable Minutes:  Evaluation 15, Gait Training 12 and Therapeutic Activity 15    Diagnosis: cardiogenic shock  Past Medical History:   Diagnosis Date    Abnormal finding on Pap smear, HPV DNA positive     Anemia associated with chronic renal failure     on Epogen    Blood type B+     Bulging discs - symptomatic      CAD (coronary artery disease)     Cardiomyopathy 3/13/2019    Diabetes mellitus, type 2     ESRD (end stage renal disease) 2004    FSGS (focal segmental glomerulosclerosis)     with collapsing    Hyperlipidemia     Hypertension     Neuropathy     NSTEMI (non-ST elevated myocardial infarction) 3/29/2019    Obesity     Secondary hyperparathyroidism, renal     TIA (transient ischemic attack)     Uterine fibroid     small uterine       Past Surgical History:   Procedure Laterality Date    ANGIOGRAM, CORONARY ARTERY N/A 4/15/2019    Performed by Roland [facility] at [facility] CATH LAB    CARDIAC CATHETERIZATION      PCI x 2     SECTION, CLASSIC      COLONOSCOPY N/A 2018    Performed by Rodrigue Russell MD at [facility] ENDO (2ND FLR)    DEBRIDEMENT-WOUND Left 2016    Performed by [facility]ressa Maddox [facility] at [facility] OR    DIALYSIS FISTULA CREATION      multiple fistulas and grafts before PD     EGD (ESOPHAGOGASTRODUODENOSCOPY) N/A 3/14/2019    Performed by Adolph [facility]ila [facility] at [facility] ENDO    EGD (ESOPHAGOGASTRODUODENOSCOPY) N/A 3/13/2019    Performed by Adolph [facility]ila MD at [facility] ENDO    INCISION AND DRAINAGE (I&D), LABIAL N/A 2016    Performed by Harmony Fernandez MD at [facility] OR    INCISION AND DRAINAGE (I&D), LABIAL (ADD ON) Left 2016    Performed by Teressa Maddox MD at [facility] OR    INCISION AND DRAINAGE OF WOUND Left     VULVAR ABCESS WITH NECROSIS    Insertion, Catheter, Central Venous, Hemodialysis  N/A 3/28/2019    Performed by [facility]o [facility]eks [facility] at [facility] CATH LAB    Insertion, Catheter, Central Venous, Hemodialysis N/A 3/18/2019    Performed by Kimo Weeks MD at [facility] CATH LAB    INSERTION, CATHETER, TUNNELED ABORTED Left 3/14/2019    Performed by Servando Solis MD at [facility] OR    INSERTION, GRAFT, ARTERIOVENOUS, RIGHT ARM Right 3/22/2019    Performed by BESSIE Wynn III, MD at [facility] OR 2ND FLR    INSERTION, INTRA-AORTIC BALLOON PUMP  4/15/2019    Performed by Roland Napier MD at [facility] CATH LAB    Left heart cath Left 4/15/2019    Performed by Roland Napier MD at [facility] CATH LAB    ORIF, HIP Left 9/13/2018    Performed by Tevin Grullon MD at [facility] OR    PERITONEAL CATHETER INSERTION      PERMCATH REWIRE- TUNNELED CATH REWIRE Left 11/13/2017    Performed by Baldev Munroe MD at [facility] CATH LAB    PERMCATH REWIRE- TUNNELED CATH REWIRE N/A 10/5/2017    Performed by Baldev Munroe MD at [facility] CATH LAB    REMOVAL, CATHETER, DIALYSIS, PERITONEAL N/A 3/14/2019    Performed by [facility] Solis MD at [facility] OR    UMBILICAL HERNIA REPAIR      Venogram, possible intervention Right 3/20/2019    Performed by BESSIE Wynn III, MD at [facility] CATH LAB     General Precautions: Standard, fall  Orthopedic Precautions: N/A   Braces: N/A    Spiritual, Cultural Beliefs, Episcopal Practices, Values that Affect Care: no    Patient History:  Lives With: other (see comments)(step daughter)  Living Arrangements: house  Home Accessibility: stairs to enter home(1)  Home Layout: Able to live on 1st floor  Stair Railings at Home: none  Financial Concerns: none  Transportation Anticipated: family or friend will provide  Living Environment Comment: Pt lives in [place] [place] w/ [address] w/ step daughter; stepdaughter can provide limited assistance when she is home; pt was IND w/ device PLOF; pt works full time and still drives; pt uses electric scooter at work and rollator within home  Equipment Currently Used at  Home: grab bar, shower chair, rollator, wheelchair, power chair, walker, rolling  DME owned (not currently used): none    Previous Level of Function:  Ambulation Skills: needs device  Transfer Skills: needs device  ADL Skills: needs device  Work/Leisure Activity: needs device    Subjective:  Communicated with pt prior to session.  Pt agreed to PT evaluation.  Chief Complaint: Increased fatigue   Patient goals: To return to previous level of function.    Pain/Comfort  Pain Rating 1: 0/10  Pain Rating Post-Intervention 1: 0/10    Objective:  Patient found sitting in w/c.      Cognitive Exam:  Oriented to: Person, Place, Time and Situation  Follows Commands/attention: Follows multistep  commands  Communication: clear/fluent  Safety awareness/insight to disability: intact    Physical Exam:  Postural examination/scapula alignment:    -       No postural abnormalities identified    Skin integrity: Visible skin intact  Edema: None noted    Sensation:      -       Intact pt does state she has numbness/tingling in BUE and BLE    Upper Extremity Range of Motion:  Right Upper Extremity: WFL  Left Upper Extremity: WFL    Upper Extremity Strength:  Right Upper Extremity: WFL  Left Upper Extremity: WFL    Lower Extremity Range of Motion:  Right Lower Extremity: WFL except AROM hip flexion (1/2 of range due to weakness)  Left Lower Extremity: WFL except AROM hip flexion (1/2 of range due to weakness)    Lower Extremity Strength:  Right Lower Extremity: hip flexion (2/5), knee extension (4/5), knee flexion (3+/5), DF (3+/5), PF (4/5)  Left Lower Extremity: hip flexion (2/5), knee extension (4/5), knee flexion (3+/5), DF (3+/5), PF (4/5)    Gross motor coordination: WFL    AM-PAC 6 CLICK MOBILITY  Total Score:16    Bed Mobility:  Sit>Supine: SBA on mat table  Supine>Sit: [place] on mat table for trunk elevation    Transfers:  Sit<>Stand: CGA w/ RW to/from w/c x 3 trials, to/from commode, to/from mat table  Stand Pivot Transfer:  Parish/CGA w/ RW w/c <> commode, w/c <> mat table  V/c for safety when pushing up and sitting down out of w/c    Gait:  Amb 38' w/ RW and CGA  Slow pauly, step through gait, appropriate heel strike    Wheelchair Mobility:  Patient propels w/c ~150' w/ BUE and SBA     Balance:  Pt is able to sit EOM w/ SBA and no LOB noted    Additional Treatment:  Education on PT POC  Pt assisted in toileting   MaxA for pants management   Set up assistance for gavino care    Patient left up in chair with call button in reach.    Assessment:  Jenni Todd is a 48 y.o. female with a medical diagnosis of cardiogenic shock. Pt tolerated PT evaluation well. Pt is CGA/Parish w/ T/F and gait. Pt is limited in activity tolerance secondary to quick onset of fatigue. PT eval has been completed and goals set accordingly. Patient will benefit from skilled physical therapy to address deficits and improve safety and functional mobility. Begin with physical therapy plan of care.     Rehab identified problem list/impairments: weakness, impaired endurance, impaired sensation, impaired self care skills, impaired functional mobilty, gait instability, impaired balance, decreased coordination, decreased lower extremity function, impaired cardiopulmonary response to activity    Rehab potential is good.    Activity tolerance: Good    Discharge recommendations: home health PT     Barriers to discharge: Decreased caregiver support    Equipment recommendations: commode     GOALS:   Multidisciplinary Problems     Physical Therapy Goals        Problem: Physical Therapy Goal    Goal Priority Disciplines Outcome Goal Variances Interventions   Physical Therapy Goal     PT, PT/OT      Description:  Goals to be met by: 2019 (2 weeks)    Patient will increase functional independence with mobility by performin. Supine to sit with Modified [place]  2. Sit to supine with Modified [place]  3. Sit to stand transfer with Supervision using Rolling  Walker or Rollator  4. Bed to chair transfer with Supervision using Rolling Walker or Rollator  5. Gait  x 150 feet with Supervision using Rolling Walker or Rollator  6. Wheelchair propulsion x100 feet with Supervision using bilateral upper extremities  7. Ascend/Descend 4 inch curb step with Stand-by Assistance using Rolling Walker or Rollator  8. Stand for 3 minutes with Stand-by Assistance using Rolling Walker or Rollator while performing an activity  9. Lower extremity exercise program x30 reps per handout, with assistance as needed                        PLAN:    Patient to be seen 5 x/week  to address the above listed problems via gait training, therapeutic activities, therapeutic exercises  Plan of Care Expires: 05/23/19    Jered Lou, FELA 4/23/2019

## 2019-04-23 NOTE — PT/OT/SLP DISCHARGE
Physical Therapy Discharge Summary    Name: Jenni Todd  MRN: [id number]   Principal Problem: Cardiogenic shock     Patient Discharged from acute Physical Therapy on 19.  Please refer to prior PT noted date on 19 for functional status.     Assessment:     Goals partially met.    Objective:     GOALS:   Multidisciplinary Problems     Physical Therapy Goals        Problem: Physical Therapy Goal    Goal Priority Disciplines Outcome Goal Variances Interventions   Physical Therapy Goal     PT, PT/OT Ongoing (interventions implemented as appropriate)     Description:  Goals to be met by: 19     Patient will increase functional independence with mobility by performin. Supine to sit with Modified [place]  2. Sit to stand transfer with Supervision  3. Bed to chair transfer with Stand-by Assistance using Rolling Walker  4. Gait  x 50 feet with Stand-by Assistance using Rolling Walker.                       Reasons for Discontinuation of Therapy Services  Transfer to alternate level of care.      Plan:     Patient Discharged to: Skilled Nursing Facility.    Brook Donnelly, PT  2019

## 2019-04-24 NOTE — NURSING
Dr. Dan and NP, [place] NICHOLS, notified of patients low BP and her refusal to take her heparin injection this morning.

## 2019-04-24 NOTE — TELEPHONE ENCOUNTER
Called to schedule consult appointment. Patient has my contact information and plan to call me back and schedule. States in skilled nursing unit at this time and want to check into when she will come to appointment. Call PRN.

## 2019-04-24 NOTE — PLAN OF CARE
Problem: Physical Therapy Goal  Goal: Physical Therapy Goal  Goals to be met by: 2019 (2 weeks)    Patient will increase functional independence with mobility by performin. Supine to sit with Modified [place]  2. Sit to supine with Modified [place]  3. Sit to stand transfer with Supervision using Rolling Walker or Rollator  4. Bed to chair transfer with Supervision using Rolling Walker or Rollator  5. Gait  x 150 feet with Supervision using Rolling Walker or Rollator  6. Wheelchair propulsion x100 feet with Supervision using bilateral upper extremities  7. Ascend/Descend 4 inch curb step with Stand-by Assistance using Rolling Walker or Rollator  8. Stand for 3 minutes with Stand-by Assistance using Rolling Walker or Rollator while performing an activity  9. Lower extremity exercise program x30 reps per handout, with assistance as needed       Outcome: Ongoing (interventions implemented as appropriate)  Goals remain appropriate

## 2019-04-24 NOTE — PT/OT/SLP PROGRESS
Occupational Therapy  Treatment    Jenni Todd   MRN: [id number]   Admitting Diagnosis: <principal problem not specified>    OT Date of Treatment: 04/24/19       Billable Minutes:  Self Care/Home Management 40 and Therapeutic Exercise 8    General Precautions: Standard, fall(cardiac)  Orthopedic Precautions: N/A  Braces: N/A    Spiritual, Cultural Beliefs, Restorationism Practices, Values that Affect Care: no    Subjective:  Communicated with nurse during  to session.  Pt. reported feeling weak    Pain/Comfort  Pain Rating 1: 0/10  Pain Rating Post-Intervention 1: 0/10    Objective:  Patient found with: (seated at sink washing face)    Occupational Performance:    Bed Mobility:    · Patient completed Sit to supine with stand by assistance     Functional Mobility/Transfers:  · Patient completed Sit <> Stand Transfer with minimum assistance  with  rolling walker   · Patient completed Bed <> Chair Transfer using Stand Pivot technique with minimum assistance with no assistive device  · Patient completed Toilet Transfer Stand Pivot technique with moderate assistance with  grab bars  · Functional Mobility: not tested  · Pt. Performed SQPT back to bed at end of session    Activities of Daily Living:  · Grooming: stand by assistance seated at sink (pt. already performing on OT arrival)  · Bathing: moderate assistance sponge bath  · Upper Body Dressing: stand by assistance to don gown like robe  · Lower Body Dressing: maximal assistance with assist to don shoes and manage pants over hips as well as for standing  · Toileting: moderate assistance with assist for clothing management    [facility] 6 Click:  [facility] Total Score: 20    OT Exercises: UE Ergometer x 8 minutes with min resistance    Additional Treatment:  Pt. BP noted to be somewhat low on this date; Pt. Requested to still attempt UBE seated (Per nursing BP in 80's)  OT clarified with NP on this date pt. Not to shower with Femoral HD site      Patient left left sidelying  with call button in reach    ASSESSMENT:  Jenni Todd is a 48 y.o. female with a medical diagnosis of <principal problem not specified> and presented with increased weakness on this date and lower BP than normal for pt. Pt. Required increased assist for some transfers and ADL tasks on this date. .    Rehab identified problem list/impairments: impaired endurance, impaired self care skills, impaired functional mobilty, impaired cardiopulmonary response to activity    Rehab potential is fair    Activity tolerance: Fair    Discharge recommendations: home health OT(may require S initially)     Barriers to discharge: Decreased caregiver support     Equipment recommendations: commode     GOALS:   Multidisciplinary Problems     Occupational Therapy Goals        Problem: Occupational Therapy Goal    Goal Priority Disciplines Outcome Interventions   Occupational Therapy Goal     OT, PT/OT     Description:  Goals to be met by: 05-06-19     Patient will increase functional independence with ADLs by performing:    UE Dressing with Mod I  LE Dressing with Mod I.  Grooming while seated with Mod I .  Toileting from bedside commode with Mod I for hygiene and clothing management.   Bathing from  edge of bed with SBA.  Supine to sit with Modified [place].  Stand pivot transfers with Mod I .  Toilet transfer to bedside commode with Mod I.  Pt. To be I with HEP for BUE to improve endurance level                      Plan:  Patient to be seen 5 x/week to address the above listed problems via self-care/home management, therapeutic activities, therapeutic exercises  Plan of Care expires: 05/23/19  Plan of Care reviewed with: patient    SABRA Mcguire  04/24/2019

## 2019-04-24 NOTE — PLAN OF CARE
Problem: Occupational Therapy Goal  Goal: Occupational Therapy Goal  Goals to be met by: 05-06-19     Patient will increase functional independence with ADLs by performing:    UE Dressing with Mod I  LE Dressing with Mod I.  Grooming while seated with Mod I .  Toileting from bedside commode with Mod I for hygiene and clothing management.   Bathing from  edge of bed with SBA.  Supine to sit with Modified [place].  Stand pivot transfers with Mod I .  Toilet transfer to bedside commode with Mod I.  Pt. To be I with HEP for BUE to improve endurance level     Pt. Tolerated session well

## 2019-04-25 PROBLEM — E87.70 HYPERVOLEMIA: Status: ACTIVE | Noted: 2019-01-01

## 2019-04-25 PROBLEM — I27.20 MODERATE TO SEVERE PULMONARY HYPERTENSION: Status: ACTIVE | Noted: 2019-01-01

## 2019-04-25 PROBLEM — I50.812 CHRONIC RIGHT-SIDED HEART FAILURE: Status: ACTIVE | Noted: 2019-01-01

## 2019-04-25 PROBLEM — T82.898A PROBLEM WITH DIALYSIS ACCESS: Status: ACTIVE | Noted: 2019-01-01

## 2019-04-25 PROBLEM — E11.42 DIABETIC POLYNEUROPATHY ASSOCIATED WITH TYPE 2 DIABETES MELLITUS: Status: ACTIVE | Noted: 2019-01-01

## 2019-04-25 PROBLEM — I50.43 ACUTE ON CHRONIC COMBINED SYSTOLIC AND DIASTOLIC HEART FAILURE: Status: ACTIVE | Noted: 2019-01-01

## 2019-04-25 PROBLEM — Z95.828 PERMANENT CENTRAL VENOUS CATHETER IN PLACE: Status: ACTIVE | Noted: 2019-01-01

## 2019-04-25 NOTE — PROGRESS NOTES
Reported to patient today that her diet had been changed from renal to 2 gm Na, 1000 ml fluid restriction, 2000 calorie diabetic. Dietary reported that they have Ranch dressing for her.   K 4.1, PO 4 4. 2

## 2019-04-25 NOTE — PLAN OF CARE
Problem: Physical Therapy Goal  Goal: Physical Therapy Goal  Goals to be met by: 2019 (2 weeks)    Patient will increase functional independence with mobility by performin. Supine to sit with Modified [place]  2. Sit to supine with Modified [place]  3. Sit to stand transfer with Supervision using Rolling Walker or Rollator  4. Bed to chair transfer with Supervision using Rolling Walker or Rollator  5. Gait  x 150 feet with Supervision using Rolling Walker or Rollator  6. Wheelchair propulsion x100 feet with Supervision using bilateral upper extremities  7. Ascend/Descend 4 inch curb step with Stand-by Assistance using Rolling Walker or Rollator  8. Stand for 3 minutes with Stand-by Assistance using Rolling Walker or Rollator while performing an activity  9. Lower extremity exercise program x30 reps per handout, with assistance as needed       Outcome: Ongoing (interventions implemented as appropriate)  Goals remain appropriate

## 2019-04-25 NOTE — PT/OT/SLP PROGRESS
"Physical Therapy  Treatment    Jenni Todd   MRN: [id number]   Admitting Diagnosis: cardiogenic shock  PT Received On: 04/25/19        Billable Minutes:  Therapeutic Activity 10 and Therapeutic Exercise 20    Treatment Type: Treatment limited session 2* to dialysis came early, cleared for therapy per nsg Risi seated 2* to low BP  PT/PTA: PTA     PTA Visit Number: 2       General Precautions: Standard, fall  Orthopedic Precautions: N/A   Braces: N/A    Spiritual, Cultural Beliefs, Catholic Practices, Values that Affect Care: no    Subjective:  "ok"    Pain/Comfort  Pain Rating 1: 0/10  Pain Rating 2: 0/10    Objective:  Patient found with: (in wc cleared for therapy seated 2* to low BP)     AM-PAC 6 CLICK MOBILITY  Total Score:16    Transfers:deferred 2* to low BP    Gait: deferred 2* to BP    Therex:  2x10 reps AP,GS,LAQ,hip flex,abd/add     WC mobility: 150 ft with BUE S    Additional Treatment:  UBE x 6 min    Patient left with dialysis transport    Assessment:  Jenni Todd is a 48 y.o. female with a medical diagnosis of Cardiogenic shock Pt tolerated well, limited session 2* to transport came early for dialysis and low BP, pt would continue to benefit from skilled PT services to improve overall functional mobility, strength and endurance.      Rehab identified problem list/impairments: weakness, impaired endurance, impaired sensation, impaired self care skills, impaired functional mobilty, gait instability, impaired balance, decreased coordination, decreased lower extremity function, impaired cardiopulmonary response to activity    Rehab potential is good.    Activity tolerance: Fair    Discharge recommendations: home health PT     Barriers to discharge: Decreased caregiver support    Equipment recommendations: commode     GOALS:   Multidisciplinary Problems     Physical Therapy Goals        Problem: Physical Therapy Goal    Goal Priority Disciplines Outcome Goal Variances Interventions   Physical " Therapy Goal     PT, PT/OT Ongoing (interventions implemented as appropriate)     Description:  Goals to be met by: 2019 (2 weeks)    Patient will increase functional independence with mobility by performin. Supine to sit with Modified [place]  2. Sit to supine with Modified [place]  3. Sit to stand transfer with Supervision using Rolling Walker or Rollator  4. Bed to chair transfer with Supervision using Rolling Walker or Rollator  5. Gait  x 150 feet with Supervision using Rolling Walker or Rollator  6. Wheelchair propulsion x100 feet with Supervision using bilateral upper extremities  7. Ascend/Descend 4 inch curb step with Stand-by Assistance using Rolling Walker or Rollator  8. Stand for 3 minutes with Stand-by Assistance using Rolling Walker or Rollator while performing an activity  9. Lower extremity exercise program x30 reps per handout, with assistance as needed                        PLAN:    Patient to be seen 5 x/week  to address the above listed problems via gait training, therapeutic activities, therapeutic exercises  Plan of Care expires: 19  Plan of Care reviewed with: patient    Sailaja Gallegos, PTA  2019

## 2019-04-25 NOTE — H&P
Hospital Medicine  History and Physical Exam    Admit Date: 4/22/2019  Principal Problem:  NSTEMI (non-ST elevated myocardial infarction)   Primary care Physician: Michael Tan Iii, MD  Code status: Full Code    HPI:   49 yo with history of coronary artery disease, chronic systolic and diastolic heart failure (EF 25%), and ESRF on peritoneal dialysis since 2004 presented to [facility] on 2/21 with abdominal pain. She was initiated on treatment for peritonitis and had removal of peritoneal catheter. She developed active GI bleed and associated encephalopathy. Patient had emergent EGD 3/13 that showed erosive esophagitis with small ulcer and clotted blood in the gastric fundus. The following day she had repeat EGD that found one non-bleeding cratered gastric ulcer with clean ulcer base. She was then transfer to [facility] ICU for vascular surgery assistance in dialysis access placement. She had RUE graft 3/22 placed, but it clotted. She has poor vascular options and vascular surgery recommended that she continue with peritoneal access if possible. She is currently being dialyzed with permacath in left groin placed 3/28. She developed NSTEMI day after groin catheter placement. Cardiology recommended goal directed medical therapy. Patient was transferred here to [facility] on 4/4 for debility. She returned to [facility] ER on 4/6 after her dialysis access did not work. TPA was instilled in her permacath and she was able to undergo a full dialysis session. Patient complains of abdominal distension that has been attributed to extra fluid. She has had some dyspnea on exertion that is somewhat worse in the last few weeks, but denies cough or wheezing. She denies chest pain, palpitations, nausea, or vomiting.      Patient transferred to [facility] with PT and OT to improve functional status and ability to perform ADLs.     Past Medical History: Patient has a past medical history  of Abnormal finding on Pap smear, HPV DNA positive (), Anemia associated with chronic renal failure, Blood type B+, Bulging discs - symptomatic , CAD (coronary artery disease), Cardiomyopathy (3/13/2019), Diabetes mellitus, type 2, ESRD (end stage renal disease) (2004), FSGS (focal segmental glomerulosclerosis), Hyperlipidemia, Hypertension (), Neuropathy, NSTEMI (non-ST elevated myocardial infarction) (3/29/2019), Obesity, Secondary hyperparathyroidism, renal, TIA (transient ischemic attack), and Uterine fibroid.    Past Surgical History: Patient has a past surgical history that includes Peritoneal catheter insertion; Umbilical hernia repair;  section, classic; Dialysis fistula creation; Cardiac catheterization; Incision and drainage of wound (Left, ); Open reduction and internal fixation (ORIF) of injury of hip (Left, 2018); Colonoscopy (N/A, 2018); Esophagogastroduodenoscopy (N/A, 3/13/2019); Esophagogastroduodenoscopy (N/A, 3/14/2019); Peritoneal catheter removal (N/A, 3/14/2019); Insertion of tunneled central venous hemodialysis catheter (N/A, 3/18/2019); Phlebography (Right, 3/20/2019); Placement of arteriovenous graft (Right, 3/22/2019); Insertion of tunneled central venous hemodialysis catheter (N/A, 3/28/2019); Left heart catheterization (Left, 4/15/2019); Insertion of intra-aortic balloon assist device (4/15/2019); and Coronary angiography (N/A, 4/15/2019).    Social History: Patient reports that she has never smoked. She has never used smokeless tobacco. She reports that she does not drink alcohol or use drugs.    Family History: family history includes Cancer in her maternal grandmother and paternal grandfather; Diabetes in her maternal aunt and paternal aunt.    Medications: reviewed     Allergies: Patient is allergic to clindamycin; flagyl [metronidazole hcl]; and metronidazole.    ROS  Constitutional: no fever or chills  Respiratory: no cough or shortness of  breath  Cardiovascular: no chest pain or palpitations  Gastrointestinal: no nausea or vomiting, no abdominal pain or change in bowel habits  Genitourinary: no hematuria or dysuria  Integument/Breast: no rash or pruritis  Hematologic/Lymphatic: no easy bruising or lymphadenopathy  Musculoskeletal: no arthralgias or myalgias  Neurological: no seizures or tremors  Behavioral/Psych: no depression or anxiety    PEx  Temp:  [96.2 °F (35.7 °C)-98.4 °F (36.9 °C)]   Pulse:  []   Resp:  [20]   BP: (90-98)/(57-62)   SpO2:  [99 %-100 %]   Body mass index is 34.11 kg/m².     General appearance: no distress  Mental status: Alert and oriented x 3  HEENT:  conjunctivae/corneas clear, PERRL  Neck: supple, thyroid not enlarged  Pulm:   normal respiratory effort, CTA B, no c/w/r  Card: RRR, no murmur  Abd: soft, NT, slightly distended, BS present; no masses, no organomegaly  Ext: no edema  Pulses: 2+, symmetric  Skin: color, texture, turgor normal. No rashes or lesions  Neuro: CN II-XII grossly intact, no focal numbness or weakness, normal strength and tone     Recent Labs   Lab 04/21/19  0420 04/22/19  0520 04/22/19  0902 04/25/19  0604    138 137 136   K 3.8 4.1 4.1 4.1    105 103 99   CO2 27 22* 24 27   BUN 10 16 16 19   CREATININE 3.6* 5.0* 5.3* 5.6*   * 130* 152* 165*   CALCIUM 8.9 9.3 9.3 8.4*   MG 1.8 1.7  --  1.5*   PHOS 3.7 4.4  --  4.2     Recent Labs   Lab 04/19/19  0330 04/20/19  0442 04/21/19  0420 04/22/19  0520   ALKPHOS 93 99  --   --    ALT 69* 52*  --   --    AST 16 14  --   --    ALBUMIN 1.8*  1.8* 1.9*  1.9* 1.8* 1.8*   PROT 6.4 6.6  --   --    BILITOT 0.4 0.3  --   --        Recent Labs   Lab 04/21/19  0420 04/22/19  0902 04/25/19  0604   WBC 11.24 10.61 12.15   HGB 7.8* 8.5* 7.5*   HCT 26.3* 28.9* 25.3*    343 421*   GRAN 49.8  5.6 56.2  6.0 57.6  7.0   LYMPH 40.6  4.6 35.4  3.8 33.0  4.0   MONO 6.8  0.8 5.3  0.6 7.2  0.9       Recent Labs   Lab 04/23/19 2048  04/24/19  0703 04/24/19  1112 04/24/19  1622 04/24/19  2032 04/25/19  0649   POCTGLUCOSE 169* 170* 182* 156* 148* 176*       Assessment and Plan:    End stage renal failure on hemodialysis TTS  Right groin permacath dialysis catheter malfunction, resolved  Hypervolemia  Fluid restriction and low salt diet  Patient to follow up with general surgery (Dr. Alonso) for new PD placement    NSTEMI  Acute on chronic systolic and diastolic heart function EF 25%  Depressed right ventricular dysfunction  Severe left atrial enlargement  Mild to moderate aortic valve stenosis  Moderate mirtral valve stenosis  Moderate to severe mitral regurgitation  Moderate pulmonary HTN  Elevated BNP  ASA 81 mg daily, Atorvastatin 40 mg daily, clopidogrel 75 mg daily  Resume carvedilol so the AM dose can be held prior to dialysis.   Continue irbesartan 75 mg daily as tolerated  Follow up with primary cardiologist, Dr. Loo as scheduled for further ischemic evaluation     peritonitis due to peritoneal dialysis catheter  Completed course of antibiotics    Gastric ulcer  Acute blood loss anemia  Iron deficiency anemia  Anemia of CKD V  protonix 40 mg daily  Procrit and IV iron as per nephrology    Pressure injury stage 2 on left buttock  Care as directed by wound nurse    DM II CKD V and HTN  A1c 6.1%  SSI    DM II with neuropathy - patient insistent on restart her gabapentin at 600 mg TID, but has been on reduced doses since she has been hospitalized. Will restart at 300 mg BID and up-titrate based on symptoms    Debility - PT/OT    Hypotension  Intra-dialytic hypotension  Carvedilol and irbesartan daily except prior to dialysis - hold parameters  Midodrine prior to dialysis sessions    Continue the following medications for treatment of the indicated conditions:  ·  Indication Medication Dose Route  Frequency       CAD aspirin  81 mg Oral Daily    CAD atorvastatin  40 mg Oral QHS    Mineral bone disease calcitRIOL  0.5 mcg Oral Daily     Ischemic cardiomyopathy carvedilol  3.125 mg Oral QHS    Ischemic cardiomyopathy [START ON 4/26/2019] carvedilol  3.125 mg Oral Every Mon, Wed, Fri    Ischemic cardiomyopathy [START ON 4/28/2019] carvedilol  3.125 mg Oral Every Sun    Mineral bone disease cinacalcet  90 mg Oral Daily    CAD clopidogrel  75 mg Oral Daily    Peripheral neuropathy gabapentin  300 mg Oral BID    DVT prophylaxis heparin (porcine)  5,000 Units Subcutaneous Q12H    Ischemic cardiomyopathy [START ON 4/26/2019] irbesartan  75 mg Oral Every Mon, Wed, Fri    Ischemic cardiomyopathy [START ON 4/28/2019] irbesartan  75 mg Oral Every Sun    intradialytic hypotension midodrine  20 mg Oral Every Tues, Thurs, Sat    Gastric ulcer pantoprazole  40 mg Oral Daily    Mineral bone disease sevelamer carbonate  800 mg Oral TID WM    nutrition vitamin renal formula (B-complex-vitamin c-folic acid)  1 capsule Oral Daily       Future Appointments   Date Time Provider Department Center   4/29/2019  2:30 PM CARDIAC, PET IMAGING [facility]   5/6/2019 11:40 AM Calos Yousif MD [facility] CARDIO [facility]       I certify that SNF services are required to be given on an inpatient basis because Jenni Todd needs for skilled nursing care and/or skilled rehabilitation are required on a daily basis and such services can only practically be provided in a skilled nursing facility setting and are for an ongoing condition for which she received inpatient care in the hospital.     Time (minutes) spent in care of the patient (Greater than 1/2 spent in direct face-to-face contact) 40    Camryn Dan MD

## 2019-04-25 NOTE — PT/OT/SLP PROGRESS
Occupational Therapy  Treatment    Jenni Todd   MRN: [id number]   Admitting Diagnosis: <principal problem not specified>    OT Date of Treatment: 04/25/19       Billable Minutes:  Self Care/Home Management 45    General Precautions: Standard, fall(cardiac)  Orthopedic Precautions: N/A  Braces: N/A    Spiritual, Cultural Beliefs, Buddhism Practices, Values that Affect Care: no    Subjective:  Communicated with nurse prior to session.  Pt. Reported she felt like very full and hard to breath because of all the fluid.  02 sats taken and noted to be as follows:  97% ; HR 95 and BP 97/68.  Nurse notified and came to assess pt. For therapy.  Nurse approved seated ADL  tasks and to pt. tolerance    Pain/Comfort  Pain Rating 1: 0/10  Pain Rating 2: 0/10    Objective:  Patient found with: (left sidelying in bed)    Occupational Performance:    Bed Mobility:    · Patient completed Supine to Sit with moderate assistance     Functional Mobility/Transfers:  · Patient completed Bed <> Chair Transfer using Squat Pivot technique with minimum assistance with no assistive device  · Functional Mobility: not tested    Activities of Daily Living:  · Feeding:  set up A seated in w/c  · Grooming: supervision seated in w/c to wash face, hair and brush teeth  · Bathing: stand by assistance for upper body washing; deferred lower body on this date  · Upper Body Dressing: stand by assistance to don pull over shirt  · Lower Body Dressing: maximal assistance with assist to don shoes and initially thread pantsd as well as to manage over hips in stand with grab bar.     [facility] 6 Click:  [facility] Total Score: 19    OT Exercises: not performed    Additional Treatment:  Pt. Vitals taken after sitting up with nurse present and noted to be:  91/62 for BP  Vitals taken once pt. Seated in w/c for breakfast after ADLs and as follows:  BP 98.64; 02 sats 100% and HR 96   Pt. Agreeable to sit up in chair for breakfast reporting feeling ok.     Patient left  up in chair with call button in reach and nurse notified    ASSESSMENT:  Jenni Todd is a 48 y.o. female with a medical diagnosis of <principal problem not specified> and presents with deficits in self-care skills, functional mobility and ADL task performance.  Pt. Limited on this date by endurance.  Pt. Tolerated session fairly.  Pt. Would benefit from continued OT services. .    Rehab identified problem list/impairments: impaired endurance, impaired self care skills, impaired functional mobilty, impaired cardiopulmonary response to activity    Rehab potential is fair    Activity tolerance: Fair    Discharge recommendations: home health OT(may require S initially)     Barriers to discharge: Decreased caregiver support     Equipment recommendations: commode     GOALS:   Multidisciplinary Problems     Occupational Therapy Goals        Problem: Occupational Therapy Goal    Goal Priority Disciplines Outcome Interventions   Occupational Therapy Goal     OT, PT/OT     Description:  Goals to be met by: 05-06-19     Patient will increase functional independence with ADLs by performing:    UE Dressing with Mod I  LE Dressing with Mod I.  Grooming while seated with Mod I .  Toileting from bedside commode with Mod I for hygiene and clothing management.   Bathing from  edge of bed with SBA.  Supine to sit with Modified [place].  Stand pivot transfers with Mod I .  Toilet transfer to bedside commode with Mod I.  Pt. To be I with HEP for BUE to improve endurance level                      Plan:  Patient to be seen 5 x/week to address the above listed problems via self-care/home management, therapeutic activities, therapeutic exercises  Plan of Care expires: 05/23/19  Plan of Care reviewed with: patient    SABRA Mcguire  04/25/2019

## 2019-04-25 NOTE — PT/OT/SLP DISCHARGE
Occupational Therapy Discharge Summary    Jenni Todd  MRN: [id number]   Principal Problem: Cardiogenic shock      Patient Discharged from acute Occupational Therapy on 4/22/19.  Please refer to prior OT note dated 4/22/19 for functional status.    Assessment:      Patient appropriate for care in another setting.    Objective:     GOALS:   Multidisciplinary Problems     Occupational Therapy Goals        Problem: Occupational Therapy Goal    Goal Priority Disciplines Outcome Interventions   Occupational Therapy Goal     OT, PT/OT Ongoing (interventions implemented as appropriate)    Description:  Goals to be met by: 5/6/19     Patient will increase functional independence with ADLs by performing:      UE Dressing with Supervision.  LE Dressing with Set-up Assistance.  Grooming while standing at sink with Supervision.  Toileting from toilet with Supervision for hygiene and clothing management.   Toilet transfer to toilet with Supervision.                    Reasons for Discontinuation of Therapy Services  Transfer to alternate level of care.      Plan:     Patient Discharged to: Skilled Nursing Facility    Shana Gonzalez OT  4/25/2019

## 2019-04-25 NOTE — PROGRESS NOTES
[facility]                                  Skilled Nursing Facility                   Progress Note     Admit Date: 4/22/2019  JESSY 5/6/2019  Principal Problem:  Problem with dialysis access   HPI obtained from patient interview and chart review     Chief Complaint: Establish Care/ Initial Visit     HPI: 47 yo with history of coronary artery disease, chronic systolic and diastolic heart failure (EF 25%), and ESRF on peritoneal dialysis since 2004 presented to [facility] on 2/21 with abdominal pain. She was initiated on treatment for peritonitis and had removal of peritoneal catheter. She developed active GI bleed and associated encephalopathy. Patient had emergent EGD 3/13 that showed erosive esophagitis with small ulcer and clotted blood in the gastric fundus. The following day she had repeat EGD that found one non-bleeding cratered gastric ulcer with clean ulcer base. She was then transfer to [facility] ICU for vascular surgery assistance in dialysis access placement. She had RUE graft 3/22 placed, but it clotted. She has poor vascular options and vascular surgery recommended that she continue with peritoneal access if possible. She is currently being dialyzed with permacath in left groin placed 3/28. She developed NSTEMI day after groin catheter placement. Cardiology recommended goal directed medical therapy. Patient was transferred here to [facility] on 4/4 for debility. She returned to [facility] ER on 4/6 after her dialysis access did not work. TPA was instilled in her permacath and she was able to undergo a full dialysis session. Patient complains of abdominal distension that has been attributed to extra fluid. She has had some dyspnea on exertion that is somewhat worse in the last few weeks, but denies cough or wheezing. She denies chest pain, palpitations, nausea, or vomiting.      Interval Hx: Ms. Todd is a 48 year old female w/  CAD s/p 2 PCI(), HFrEF 30%, HTN, DM, Obesity, and ESRD who was on home PD who presents to SNF for sequela of septic shock of unknown etiology and NSTEMI with new HFrEF. During f/u of care, patient continued to be treated at [facility] with PT and OT to improve functional status and ability to perform ADLs.    Past Medical History: Patient has a past medical history of Abnormal finding on Pap smear, HPV DNA positive (), Anemia associated with chronic renal failure, Blood type B+, Bulging discs - symptomatic , CAD (coronary artery disease), Cardiomyopathy (3/13/2019), Diabetes mellitus, type 2, ESRD (end stage renal disease) (2004), FSGS (focal segmental glomerulosclerosis), Hyperlipidemia, Hypertension (), Neuropathy, NSTEMI (non-ST elevated myocardial infarction) (3/29/2019), Obesity, Secondary hyperparathyroidism, renal, TIA (transient ischemic attack), and Uterine fibroid.    Past Surgical History: Patient has a past surgical history that includes Peritoneal catheter insertion; Umbilical hernia repair;  section, classic; Dialysis fistula creation; Cardiac catheterization; Incision and drainage of wound (Left, ); Open reduction and internal fixation (ORIF) of injury of hip (Left, 2018); Colonoscopy (N/A, 2018); Esophagogastroduodenoscopy (N/A, 3/13/2019); Esophagogastroduodenoscopy (N/A, 3/14/2019); Peritoneal catheter removal (N/A, 3/14/2019); Insertion of tunneled central venous hemodialysis catheter (N/A, 3/18/2019); Phlebography (Right, 3/20/2019); Placement of arteriovenous graft (Right, 3/22/2019); Insertion of tunneled central venous hemodialysis catheter (N/A, 3/28/2019); Left heart catheterization (Left, 4/15/2019); Insertion of intra-aortic balloon assist device (4/15/2019); and Coronary angiography (N/A, 4/15/2019).    Social History: Patient reports that she has never smoked. She has never used smokeless tobacco. She reports that she does not drink alcohol or use  drugs.    Family History: family history includes Cancer in her maternal grandmother and paternal grandfather; Diabetes in her maternal aunt and paternal aunt.    Medications:   Prior to Admission medications    Medication Sig Start Date End Date Taking? Authorizing Provider   aspirin (ECOTRIN) 81 MG EC tablet Take 1 tablet (81 mg total) by mouth once daily. 1/16/17   Calos Yousif MD   atorvastatin (LIPITOR) 40 MG tablet Take 40 mg by mouth every evening.     Historical Provider, MD   calcitRIOL (ROCALTROL) 0.25 MCG Cap Take 0.5 mcg by mouth once daily.     Historical Provider, MD   cinacalcet (SENSIPAR) 90 MG Tab Take 90 mg by mouth once daily.    Historical Provider, MD   clopidogrel (PLAVIX) 75 mg tablet Take 1 tablet (75 mg total) by mouth once daily. 4/23/19 4/22/20  Hedy Rapp MD   epoetin adonay 10,000 unit/mL Soln 1 mL, epoetin adonay 20,000 unit/mL Soln 1 mL Inject 30,000 Units into the vein every 14 (fourteen) days.    Historical Provider, MD   gabapentin (NEURONTIN) 100 MG capsule 1 capsule 3 (three) times daily. 6/6/18   Historical Provider, MD   insulin detemir U-100 (LEVEMIR FLEXTOUCH) 100 unit/mL (3 mL) SubQ InPn pen Inject 5 Units into the skin every evening. 4/22/19 4/21/20  Hedy Rapp MD   irbesartan (AVAPRO) 75 MG tablet Take 1 tablet (75 mg total) by mouth once daily. 4/23/19 4/22/20  Hedy Rapp MD   metoprolol succinate (TOPROL-XL) 25 MG 24 hr tablet Take 1 tablet (25 mg total) by mouth once daily. 4/23/19 4/22/20  Hedy Rapp MD   nateglinide (STARLIX) 120 MG tablet STARLIX 120MG 30 MINUTES BEFORE EACH MEAL.    Historical Provider, MD   ONETOUCH VERIO Strp USE 1 STRIP ONCE DAILY IN VITRO 2/24/17   Historical Provider, MD   pantoprazole (PROTONIX) 40 MG tablet Take 1 tablet (40 mg total) by mouth once daily. 3/23/19 3/22/20  Guevara Metzger MD   sevelamer carbonate (RENVELA) 800 mg Tab Take 1 tablet (800 mg total) by mouth 3 (three) times daily with meals. 3/22/19 3/21/20  Guevara  MD Yassine   vitamin renal formula, B-complex-vitamin c-folic acid, (B COMPLEX-C-FOLIC ACID) 1 mg Cap Take 1 capsule by mouth once daily. 1 Capsule Oral Every day    Historical Provider, MD       Current Meds:    aspirin  81 mg Oral Daily    atorvastatin  40 mg Oral QHS    calcitRIOL  0.5 mcg Oral Daily    carvedilol  3.125 mg Oral QHS    [START ON 4/26/2019] carvedilol  3.125 mg Oral Every Mon, Wed, Fri    [START ON 4/28/2019] carvedilol  3.125 mg Oral Every Sun    cinacalcet  90 mg Oral Daily    clopidogrel  75 mg Oral Daily    gabapentin  300 mg Oral BID    heparin (porcine)  5,000 Units Subcutaneous Q12H    [START ON 4/26/2019] irbesartan  75 mg Oral Every Mon, Wed, Fri    [START ON 4/28/2019] irbesartan  75 mg Oral Every Sun    midodrine  20 mg Oral Every Tues, Thurs, Sat    pantoprazole  40 mg Oral Daily    sevelamer carbonate  800 mg Oral TID WM    vitamin renal formula (B-complex-vitamin c-folic acid)  1 capsule Oral Daily       Allergies: Patient is allergic to clindamycin; flagyl [metronidazole hcl]; and metronidazole.    ROS  Constitutional: Negative for fever and malaise/fatigue.   Eyes: Negative for blurred vision, double vision and discharge.   Respiratory: Negative for cough, shortness of breath and wheezing.    Cardiovascular: Negative for chest pain, palpitations, claudication, leg swelling and PND.   Gastrointestinal: Negative for abdominal pain, constipation, diarrhea, nausea and vomiting.   Genitourinary: Negative for dysuria, frequency and urgency.   Musculoskeletal:  + generalized weakness. Negative for back pain and myalgias.   Skin: Negative for itching and rash.   Neurological: Negative for dizziness, speech change, seizures, and headaches.   Psychiatric/Behavioral: Negative for depression. The patient is not nervous/anxious.      PEx  Temp:  [96.2 °F (35.7 °C)-98.4 °F (36.9 °C)]   Pulse:  []   Resp:  [20]   BP: (90-98)/(57-62)   SpO2:  [99 %-100 %]   Body mass index  is 34.11 kg/m².      Constitutional: Patient appears well-developed and well-nourished.   HENT:   Head: Normocephalic and atraumatic.   Eyes: Pupils are equal, round, and reactive to light.   Neck: Normal range of motion. Neck supple.   Cardiovascular: Normal rate, regular rhythm and normal heart sounds.    Pulmonary/Chest: Effort normal and breath sounds are clear  Abdominal: Soft. Bowel sounds are normal.   Musculoskeletal: Normal range of motion.   Neurological: Alert and oriented to person, place, and time.   Skin: Skin is warm and dry. + Lt femoral HD cath  Psychiatric: Normal mood and affect. Behavior is normal.     Recent Labs   Lab 04/21/19  0420 04/22/19  0902 04/25/19  0604   WBC 11.24 10.61 12.15   HGB 7.8* 8.5* 7.5*   HCT 26.3* 28.9* 25.3*    343 421*     Recent Labs   Lab 04/21/19  0420 04/22/19  0520 04/22/19  0902 04/25/19  0604    138 137 136   K 3.8 4.1 4.1 4.1    105 103 99   CO2 27 22* 24 27   BUN 10 16 16 19   CREATININE 3.6* 5.0* 5.3* 5.6*   * 130* 152* 165*   CALCIUM 8.9 9.3 9.3 8.4*   MG 1.8 1.7  --  1.5*   PHOS 3.7 4.4  --  4.2     Recent Labs   Lab 04/19/19  0330 04/20/19  0442 04/21/19  0420 04/22/19  0520   ALKPHOS 93 99  --   --    ALT 69* 52*  --   --    AST 16 14  --   --    ALBUMIN 1.8*  1.8* 1.9*  1.9* 1.8* 1.8*   PROT 6.4 6.6  --   --    BILITOT 0.4 0.3  --   --       Recent Labs   Lab 04/23/19 2048 04/24/19  0703 04/24/19  1112 04/24/19  1622 04/24/19  2032 04/25/19  0649   POCTGLUCOSE 169* 170* 182* 156* 148* 176*       Assessment and Plan:  ESRD (end stage renal disease) on dialysis  peritonitis due to peritoneal dialysis catheter  -Previously PD patient; admitted 2/21 at [facility] for peritonitis, had PD catheter removed for recurrent peritonitis & transitioned to HD. Abx complete for peritonitis  Bilateral IJ tunneled catheters were attempted without success due to thrombus. Now has a temporary dialysis catheter in her left fem that was placed 3/28.   Prior to permacath placement, she had a next day graft placed by Dr. Leger on 3/22, but it was never used and was clotted 1 day post-op. Patient refused to have the graft declotted, so the permacatch was placed at that time.  -4/25 HD TTS, HD today     NSTEMI (non-ST elevated myocardial infarction)  Acute systolic heart failure  -s/p ACS protocol (DAPT, statin, heparin drip stopped)/NSTEMI   -CT surgery/CABG eval revealed, not a candidate due to comorbidities.   -HTS consult as outpatient to discuss advanced options  -LV EF of 25%  -4/25 Continue HD as outpatient for volume removal  -Continue Asa, statin, and plavix daily  -Continue Coreg and hold coreg the am before dialysis on TTS  -Continue irbesartan tablet 75 mg every MWFSunday  -F/u with cards, Ndandu      Anemia in chronic kidney disease, on chronic dialysis  -Continue Epo per neph and protonix     Pressure injury of sacral region, stage 2  -Wound care consulted    Debility  - Continue with PT/OT for gait training and strengthening and restoration of ADL's   - Encourage mobility, OOB in chair, and early ambulation as appropriate  - Fall precautions   - Monitor for bowel and bladder dysfunction  - Monitor for and prevent skin breakdown and pressure ulcers  - Continue DVT prophylaxis with heparin     DM II with neuropathy   - Continue Gabapentin 300 mg bid    Future Appointments   Date Time Provider Department Center   4/29/2019  2:30 PM CARDIAC, PET IMAGING [facility] DAVID Morel   5/6/2019 11:40 AM Calos Yousif MD [facility] CARDIO [facility]       I certify that SNF services are required to be given on an inpatient basis because Jenni Todd needs for skilled nursing care and/or skilled rehabilitation are required on a daily basis and such services can only practically be provided in a skilled nursing facility setting and are for an ongoing condition for which she received inpatient care in the hospital.     Time (minutes) spent in the care of the  patient (Greater than 1/2 spent in non direct face-to-face contact) 66 mins.   36 of 66 minutes spent on documentation and counseling patient on clinical condition and therapies provided regarding ESRD, NSTEMI, and Debility. The remainder of the time was spent in direct patient care.     Cleveland Monterroso, NP

## 2019-04-25 NOTE — NURSING
Patient return back to the unit via wheelchair van, awake & alert with no c/o voiced nor distress noted.

## 2019-04-25 NOTE — PLAN OF CARE
Problem: Adult Inpatient Plan of Care  Goal: Plan of Care Review  Outcome: Ongoing (interventions implemented as appropriate)     04/25/19 0542   Plan of Care Review   Plan of Care Reviewed With patient       Problem: Fall Injury Risk  Goal: Absence of Fall and Fall-Related Injury    Intervention: Promote Injury-Free Environment     04/25/19 0542   Optimize [place]hing and Functional Mobility   Environmental Safety Modification assistive device/personal items within reach   Optimize Balance and Safe Activity   Safety Promotion/Fall Prevention lighting adjusted;medications reviewed;instructed to call staff for mobility

## 2019-04-26 NOTE — PT/OT/SLP PROGRESS
"Physical Therapy  Treatment    Jenni Todd   MRN: [id number]   Admitting Diagnosis: Problem with dialysis access    PT Received On: 04/26/19        Billable Minutes:  Therapeutic Activity 8 and Therapeutic Exercise 23    Treatment Type: Treatment  PT/PTA: PTA     PTA Visit Number: 3       General Precautions: Standard, fall  Orthopedic Precautions: N/A   Braces: N/A    Spiritual, Cultural Beliefs, Alevism Practices, Values that Affect Care: no    Subjective: "not really feeling good" agreeable to supine therex  Communicated with nsmartha Menard and DIONE Guthrie prior to session cleared for therapy as tolerated, BP supine at rest 81/56 bpm,  nsg/NP notified    Pain/Comfort  Pain Rating 1: (discomfort)  Location - Orientation 1: generalized  Location 1: abdomen  Pain Addressed 1: Nurse notified, Other (see comments)(notified/aware NP Cleveland )  Pain Rating Post-Intervention 1: ("all the time, feels full")    Objective:   Patient found with: (in bed)     AM-PAC 6 CLICK MOBILITY  Total Score:16    Bed Mobility:  Supine>Sit: min A with trunk elev    Transfers:deferred 2* to low BP    Gait:deferred 2* to low BP    Therex:  2x10 reps A/AA as needed with rest breaks AP,GS,QS,SAQ,HS,abd/add    Patient left sitting EOB, PCT outside of door, pt wanted to try and eat aliitle with call button in reach and belongings in reach.    Assessment:  Jenni Todd is a 48 y.o. female with a medical diagnosis of Problem with dialysis access.  Pt tolerated fairly well, remains limited not feeling well, low BP, pt would continue to benefit from skilled PT services to improve overall functional mobility, strength and endurance.  .    Rehab identified problem list/impairments: weakness, impaired endurance, impaired sensation, impaired self care skills, impaired functional mobilty, gait instability, impaired balance, decreased coordination, decreased lower extremity function, impaired cardiopulmonary response to activity    Rehab " potential is good.    Activity tolerance: Fair/poor    Discharge recommendations: home health PT with A for safety    Barriers to discharge: Decreased caregiver support    Equipment recommendations: commode     GOALS:   Multidisciplinary Problems     Physical Therapy Goals        Problem: Physical Therapy Goal    Goal Priority Disciplines Outcome Goal Variances Interventions   Physical Therapy Goal     PT, PT/OT Ongoing (interventions implemented as appropriate)     Description:  Goals to be met by: 2019 (2 weeks)    Patient will increase functional independence with mobility by performin. Supine to sit with Modified [place]  2. Sit to supine with Modified [place]  3. Sit to stand transfer with Supervision using Rolling Walker or Rollator  4. Bed to chair transfer with Supervision using Rolling Walker or Rollator  5. Gait  x 150 feet with Supervision using Rolling Walker or Rollator  6. Wheelchair propulsion x100 feet with Supervision using bilateral upper extremities  7. Ascend/Descend 4 inch curb step with Stand-by Assistance using Rolling Walker or Rollator  8. Stand for 3 minutes with Stand-by Assistance using Rolling Walker or Rollator while performing an activity  9. Lower extremity exercise program x30 reps per handout, with assistance as needed                        PLAN:    Patient to be seen 5 x/week  to address the above listed problems via gait training, therapeutic activities, therapeutic exercises  Plan of Care expires: 19  Plan of Care reviewed with: patient    Sailaja Gallegos, PTA  2019

## 2019-04-26 NOTE — PLAN OF CARE
Problem: Occupational Therapy Goal  Goal: Occupational Therapy Goal  Goals to be met by: 05-06-19     Patient will increase functional independence with ADLs by performing:    UE Dressing with Mod I  LE Dressing with Mod I.  Grooming while seated with Mod I .  Toileting from bedside commode with Mod I for hygiene and clothing management.   Bathing from  edge of bed with SBA.  Supine to sit with Modified [place].  Stand pivot transfers with Mod I .  Toilet transfer to bedside commode with Mod I.  Pt. To be I with HEP for BUE to improve endurance level     Pt. Continues to have decreased BP and with limitations in therapy 2/2 to not feeling well.

## 2019-04-26 NOTE — DISCHARGE SUMMARY
"Discharge Summary  SNF     Admit Date: 4/04/2019     Discharge Date: 4/12/2019     LOS: 8     Principle Diagnosis: Septic shock     Secondary Diagnoses:          Active Hospital Problems     Diagnosis   POA    *Septic shock [A41.9, R65.21]   Yes    Problem with vascular access [Z78.9]   Unknown    ESRD (end stage renal disease) on dialysis [N18.6, Z99.2]   Not Applicable    CAD (coronary artery disease) [I25.10]   Yes       Cath 12/27/2012:              RCA PCI 2.5 x 18 and 3.0 x 26 mm BMS              Patent LAD and LCX              Normal EF                 Dr. Berrios for NSTEMI                    DAPT score 3                     Anemia in chronic kidney disease, on chronic dialysis [N18.6, D63.1, Z99.2]   Not Applicable       on Epogen                        HPI:  On 4/11/2019 the patient to dialysis in the am and return to SNF facility at 310pm with no complaints. Pt reported fatigue 2nd to dialysis treatment and politely declined PT. At 750 pm the patient was complaining of tightness in her chest pain 4/10, generalized weakness, and stated "I don't feel well", VS: BP 82/52, T 101.1F, and . Charge nurse notified of abnormal vitals. On call provider paged. @ 2015: New orders given for CXR, labs, and EKG. @ 2325  Patient becoming increasingly restless, diaphoretic, and "feel short of breath". . On call provider notified of current situation. New orders to send pt to ED for evaluation.  @ 2340: Report called to ED. @ 2345: EMS arrived to unit for patient transfer. @ 0015: Pt off unit via stretcher per [facility] to transport to ED.  Multiple unsuccessful attempts were made to obtain BPs and lab draws on patient. Charge nurse & on call provider aware.  Patient offically discharged to the ED        Significant Laboratory Data:  HbA1C:         Lab Results   Component Value Date     HGBA1C 6.1 (H) 04/12/2019      TSH:         Lab Results   Component Value Date     TSH 7.269 (H) 04/12/2019      Lipids:       "   Lab Results   Component Value Date     CHOL 199 02/24/2017     CHOL 124 07/06/2015     CHOL 245 (H) 05/07/2014            Lab Results   Component Value Date     HDL 19 (L) 02/24/2017            Lab Results   Component Value Date     LDLCALC Invalid, Trig>400.0 02/24/2017            Lab Results   Component Value Date     TRIG 460 (H) 02/24/2017            Lab Results   Component Value Date     CHOLHDL 9.5 (L) 02/24/2017         Consultations:  IP CONSULT TO CRITICAL CARE MEDICINE  IP CONSULT TO NEPHROLOGY  IP CONSULT TO CARDIOLOGY     Discharge Medications:                Jenni Todd   Home Medication Instructions SHAWNA:[id number]     Printed on:04/12/19 1257   Medication Information                                       amLODIPine (NORVASC) 10 MG tablet  Take 10 mg by mouth once daily.                      aspirin (ECOTRIN) 81 MG EC tablet  Take 1 tablet (81 mg total) by mouth once daily.                      atorvastatin (LIPITOR) 40 MG tablet  Take 40 mg by mouth every evening.                       calcitRIOL (ROCALTROL) 0.25 MCG Cap  Take 0.5 mcg by mouth once daily.                       cinacalcet (SENSIPAR) 90 MG Tab  Take 90 mg by mouth once daily.                      epoetin adonay 10,000 unit/mL Soln 1 mL, epoetin adonay 20,000 unit/mL Soln 1 mL  Inject 30,000 Units into the vein every 14 (fourteen) days.                      gabapentin (NEURONTIN) 100 MG capsule  1 capsule 3 (three) times daily.                      insulin detemir U-100 (LEVEMIR FLEXTOUCH) 100 unit/mL (3 mL) SubQ InPn pen  Inject 10 Units into the skin every evening.                      nateglinide (STARLIX) 120 MG tablet  STARLIX 120MG 30 MINUTES BEFORE EACH MEAL.                      ONETOUCH VERIO Strp  USE 1 STRIP ONCE DAILY IN VITRO                      pantoprazole (PROTONIX) 40 MG tablet  Take 1 tablet (40 mg total) by mouth once daily.                      sevelamer carbonate (RENVELA) 800 mg Tab  Take 1 tablet (800 mg  total) by mouth 3 (three) times daily with meals.                      vitamin renal formula, B-complex-vitamin c-folic acid, (B COMPLEX-C-FOLIC ACID) 1 mg Cap  Take 1 capsule by mouth once daily. 1 Capsule Oral Every day                         Diet: NPO     Level of Activity: As tolerated.     Follow up Plan:  Discharged to ED     Discharge Disposition:  Patient was discharged to ED in unstable condition.     This discharge took 40 minutes to complete.

## 2019-04-26 NOTE — PT/OT/SLP PROGRESS
Occupational Therapy  Treatment    Jenni Todd   MRN: [id number]   Admitting Diagnosis: Problem with dialysis access    OT Date of Treatment: 04/26/19       Billable Minutes:  Self Care/Home Management 30 and Therapeutic Exercise 10    General Precautions: Standard, fall(cardiac)  Orthopedic Precautions: N/A  Braces: N/A    Spiritual, Cultural Beliefs, Caodaism Practices, Values that Affect Care: no    Subjective:  Communicated with nurse as well as NP who came to assess pt.  prior to session.  I just don't feel good. (Notified NP and vitals taken noted to be:  BP 89/57; 02 at 97% and )  NP approved therapy session as tolerated by pt.     Pain/Comfort  Pain Rating 1: (discomfort in abdomen 2/2 distention)  Location 1: abdomen  Pain Addressed 1: Nurse notified  Pain Rating Post-Intervention 1: (discomfort persists)    Objective:  Patient found with: (seated at EOB)    Occupational Performance:    Bed Mobility:    · Not tested 2/2 pt. Already seated up on side of bed     Functional Mobility/Transfers:  · Patient completed Sit <> Stand Transfer with minimum assistance  with  4 wheeled walker   · Patient completed Bed <> Chair Transfer using Stand Pivot technique with minimum assistance with 4 wheeled walker  · Patient completed Toilet Transfer Stand Pivot technique with minimum assistance with  4 wheeled walker  · Functional Mobility: Pt. Did not ambulate in room    Activities of Daily Living:  · Grooming: supervision seated at sink to brush teeth and wash face  · Upper Body Dressing: moderate assistance to don gown like robe  · Toileting: moderate assistance with management of undergarment back over hips in stand    [facility] 6 Click:  [facility] Total Score: 18    OT Exercises: UE Ergometer x 10 minutes with min resistance    Additional Treatment:  Pt. Educated on safety with transfers   NP consulted with on this date re: pt. Pressure and not feeling well.    Patient left up in chair with call button in  reach    ASSESSMENT:  Jenni Todd is a 48 y.o. female with a medical diagnosis of Problem with dialysis access and presents with deficits in self-care skills as well as functional mobility.  Pt. Noted to have abdominal discomfort as well as decreased BP on this date.  Pt. With decreased endurance and requires multiple rest breaks for transfers and toileting task on this date..    Rehab identified problem list/impairments: impaired endurance, impaired self care skills, impaired functional mobilty, impaired cardiopulmonary response to activity    Rehab potential is fair    Activity tolerance: Fair    Discharge recommendations: home health OT(may require S initially)     Barriers to discharge: Decreased caregiver support     Equipment recommendations: commode     GOALS:   Multidisciplinary Problems     Occupational Therapy Goals        Problem: Occupational Therapy Goal    Goal Priority Disciplines Outcome Interventions   Occupational Therapy Goal     OT, PT/OT     Description:  Goals to be met by: 05-06-19     Patient will increase functional independence with ADLs by performing:    UE Dressing with Mod I  LE Dressing with Mod I.  Grooming while seated with Mod I .  Toileting from bedside commode with Mod I for hygiene and clothing management.   Bathing from  edge of bed with SBA.  Supine to sit with Modified [place].  Stand pivot transfers with Mod I .  Toilet transfer to bedside commode with Mod I.  Pt. To be I with HEP for BUE to improve endurance level                      Plan:  Patient to be seen 5 x/week to address the above listed problems via self-care/home management, therapeutic activities, therapeutic exercises  Plan of Care expires: 05/23/19  Plan of Care reviewed with: patient    SABRA Mcguire  04/26/2019

## 2019-04-26 NOTE — PROGRESS NOTES
Wound care consult received from NP for abdomen wounds.  Pt known to wound care team from previous admissions to hospital and SNF.  See assessment below.  Recommend to continue with silver hydrofiber packing to abdomen wounds M,W, F to reduce bioburden and promote healing.  Wound to R buttocks resolved.  Will continue with moisture barrier to prevent further breakdown.  Notified nursing of care provided today.  Wound care orders are in place.  Wound care team to follow prn.     04/26/19 0847        Incision/Site 03/19/19 Right Abdomen   Date First Assessed: 03/19/19   Side: Right  Location: Abdomen   Wound Image    Incision WDL ex   Dressing Appearance Intact   Drainage Amount Small   Drainage Characteristics/Odor Serosanguineous   Appearance Pink;Red;Slough   Periwound Area Scar tissue   Wound Edges Open   Wound Length (cm) 0.7 cm   Wound Width (cm) 0.9 cm   Wound Depth (cm) 0.5 cm   Wound Volume (cm^3) 0.32 cm^3   Wound Surface Area (cm^2) 0.63 cm^2   Care Cleansed with:;Sterile normal saline   Dressing Changed;Silver;Hydrofiber;Island/border   Dressing Change Due 04/29/19        Incision/Site 03/19/19 Abdomen lower   Date First Assessed: 03/19/19   Location: Abdomen  Orientation: lower   Wound Image    Wound Length (cm) 0.5 cm   Wound Width (cm) 2.1 cm   Wound Depth (cm) 3.2 cm   Wound Volume (cm^3) 3.36 cm^3   Wound Surface Area (cm^2) 1.05 cm^2   Care Cleansed with:;Sterile normal saline   Dressing Changed;Silver;Hydrofiber;Island/border   Dressing Change Due 04/29/19        Incision/Site 03/14/19 1445 Left Abdomen   Date First Assessed/Time First Assessed: 03/14/19 1445   Side: Left  Location: Abdomen   Wound Image    Incision WDL ex   Dressing Appearance Intact   Drainage Amount Small   Drainage Characteristics/Odor Serosanguineous   Appearance Pink;Red;Slough   Periwound Area Scar tissue   Wound Edges Open   Wound Length (cm) 0.8 cm   Wound Width (cm) 1.8 cm   Wound Depth (cm) 1 cm   Wound Volume (cm^3) 1.44  cm^3   Wound Surface Area (cm^2) 1.44 cm^2   Care Cleansed with:;Sterile normal saline   Dressing Changed;Hydrofiber;Silver;Island/border   Dressing Change Due 04/29/19        Pressure Injury 03/27/19 Right Buttocks Stage 2   Date First Assessed: 03/27/19   Pressure Injury Present on Admission: suspected hospital acquired  Side: Right  Location: Buttocks  Staging: Stage 2   Wound Image    Staging Stage 2  (resolved)   Wound Length (cm) 0 cm   Wound Width (cm) 0 cm   Wound Depth (cm) 0 cm   Wound Volume (cm^3) 0 cm^3   Wound Surface Area (cm^2) 0 cm^2   Care Cleansed with:;Sterile normal saline;Applied:;Skin Barrier

## 2019-04-26 NOTE — PLAN OF CARE
Problem: Physical Therapy Goal  Goal: Physical Therapy Goal  Goals to be met by: 2019 (2 weeks)    Patient will increase functional independence with mobility by performin. Supine to sit with Modified [place]  2. Sit to supine with Modified [place]  3. Sit to stand transfer with Supervision using Rolling Walker or Rollator  4. Bed to chair transfer with Supervision using Rolling Walker or Rollator  5. Gait  x 150 feet with Supervision using Rolling Walker or Rollator  6. Wheelchair propulsion x100 feet with Supervision using bilateral upper extremities  7. Ascend/Descend 4 inch curb step with Stand-by Assistance using Rolling Walker or Rollator  8. Stand for 3 minutes with Stand-by Assistance using Rolling Walker or Rollator while performing an activity  9. Lower extremity exercise program x30 reps per handout, with assistance as needed       Outcome: Ongoing (interventions implemented as appropriate)  Goals remain appropriate

## 2019-04-26 NOTE — NURSING
Notified Charge nurse Kiley Rea informed her of patients vitals and patient was scheduled to receive Coreg 3.125 mg at 2100 with no parameters. Will continue to hold Coreg till AM per Kiley Rea

## 2019-04-27 NOTE — PLAN OF CARE
Problem: Adult Inpatient Plan of Care  Goal: Plan of Care Review     04/27/19 1818   Plan of Care Review   Plan of Care Reviewed With patient       Problem: Infection  Goal: Infection Symptom Resolution  Outcome: Ongoing (interventions implemented as appropriate)  Intervention: Prevent or Manage Infection     04/27/19 1818   Prevent or Manage Infection   Fever Reduction/Comfort Measures lightweight bedding   Infection Management aseptic technique maintained         Problem: Fall Injury Risk  Goal: Absence of Fall and Fall-Related Injury    Intervention: Identify and Manage Contributors to Fall Injury Risk     04/27/19 1818   Manage Acute Allergic Reaction   Medication Review/Management medications reviewed   Identify and Manage Contributors to Fall Injury Risk   Self-Care Promotion independence encouraged         Comments: Patient monitored every 1 to 2 hours for pain and safety.  Safety maintained.  Blood glucose monitored  before meals and bedtime.  Patient instructed to call for assistance.Call  Light and persoanl items in reach.

## 2019-04-27 NOTE — PROGRESS NOTES
Patient returned from dialysis via wheelchair  Per transport.  Patient's  Weight after dialysis   95.5 kg.  Vital signs after dialysis  Temp  96.8  Pulse  77 resp  18  Blood pressure  111/67.  Lunch heated up for patient.  Call light in reach.

## 2019-04-27 NOTE — PROGRESS NOTES
[facility]                                  Skilled Nursing Facility                   Progress Note     Admit Date: 4/22/2019  JESSY 5/6/2019  Principal Problem:  Problem with dialysis access   HPI obtained from patient interview and chart review     Chief Complaint: Nurse reporting hypotension     HPI: 49 yo with history of coronary artery disease, chronic systolic and diastolic heart failure (EF 25%), and ESRF on peritoneal dialysis since 2004 presented to [facility] on 2/21 with abdominal pain. She was initiated on treatment for peritonitis and had removal of peritoneal catheter. She developed active GI bleed and associated encephalopathy. Patient had emergent EGD 3/13 that showed erosive esophagitis with small ulcer and clotted blood in the gastric fundus. The following day she had repeat EGD that found one non-bleeding cratered gastric ulcer with clean ulcer base. She was then transfer to [facility] ICU for vascular surgery assistance in dialysis access placement. She had RUE graft 3/22 placed, but it clotted. She has poor vascular options and vascular surgery recommended that she continue with peritoneal access if possible. She is currently being dialyzed with permacath in left groin placed 3/28. She developed NSTEMI day after groin catheter placement. Cardiology recommended goal directed medical therapy. Patient was transferred here to [facility] on 4/4 for debility. She returned to [facility] ER on 4/6 after her dialysis access did not work. TPA was instilled in her permacath and she was able to undergo a full dialysis session. Patient complains of abdominal distension that has been attributed to extra fluid. She has had some dyspnea on exertion that is somewhat worse in the last few weeks, but denies cough or wheezing. She denies chest pain, palpitations, nausea, or vomiting.      Interval Hx: During f/u of care, patient continued to  be treated at [facility] with PT and OT to improve functional status and ability to perform ADLs. Today, nurse reporting patients SBPs in the 80s. This ams reading was 84/51, prior to dialysis. Discontinuing all bp meds for now.      Past Medical History: Patient has a past medical history of Abnormal finding on Pap smear, HPV DNA positive (), Anemia associated with chronic renal failure, Blood type B+, Bulging discs - symptomatic , CAD (coronary artery disease), Cardiomyopathy (3/13/2019), Diabetes mellitus, type 2, ESRD (end stage renal disease) (2004), FSGS (focal segmental glomerulosclerosis), Hyperlipidemia, Hypertension (), Neuropathy, NSTEMI (non-ST elevated myocardial infarction) (3/29/2019), Obesity, Secondary hyperparathyroidism, renal, TIA (transient ischemic attack), and Uterine fibroid.    Past Surgical History: Patient has a past surgical history that includes Peritoneal catheter insertion; Umbilical hernia repair;  section, classic; Dialysis fistula creation; Cardiac catheterization; Incision and drainage of wound (Left, ); Open reduction and internal fixation (ORIF) of injury of hip (Left, 2018); Colonoscopy (N/A, 2018); Esophagogastroduodenoscopy (N/A, 3/13/2019); Esophagogastroduodenoscopy (N/A, 3/14/2019); Peritoneal catheter removal (N/A, 3/14/2019); Insertion of tunneled central venous hemodialysis catheter (N/A, 3/18/2019); Phlebography (Right, 3/20/2019); Placement of arteriovenous graft (Right, 3/22/2019); Insertion of tunneled central venous hemodialysis catheter (N/A, 3/28/2019); Left heart catheterization (Left, 4/15/2019); Insertion of intra-aortic balloon assist device (4/15/2019); and Coronary angiography (N/A, 4/15/2019).    Social History: Patient reports that she has never smoked. She has never used smokeless tobacco. She reports that she does not drink alcohol or use drugs.    Family History: family history includes Cancer in her maternal  grandmother and paternal grandfather; Diabetes in her maternal aunt and paternal aunt.    Allergies: Patient is allergic to clindamycin; flagyl [metronidazole hcl]; and metronidazole.    ROS  Constitutional: Negative for fever and malaise/fatigue.   Eyes: Negative for blurred vision, double vision and discharge.   Respiratory: Negative for cough, shortness of breath and wheezing.    Cardiovascular: Negative for chest pain, palpitations, claudication, leg swelling and PND.   Gastrointestinal: Negative for abdominal pain, constipation, diarrhea, nausea and vomiting.   Genitourinary: Negative for dysuria, frequency and urgency.   Musculoskeletal:  + generalized weakness. Negative for back pain and myalgias.   Skin: Negative for itching and rash.   Neurological: Negative for dizziness, speech change, seizures, and headaches.   Psychiatric/Behavioral: Negative for depression. The patient is not nervous/anxious.      PEx  Temp:  [97.9 °F (36.6 °C)-98.8 °F (37.1 °C)]   Pulse:  []   Resp:  [17-19]   BP: (80-91)/(51-66)   SpO2:  [98 %-99 %]   Body mass index is 33.63 kg/m².      Constitutional: Patient appears well-developed and well-nourished.   HENT:   Head: Normocephalic and atraumatic.   Eyes: Pupils are equal, round, and reactive to light.   Neck: Normal range of motion. Neck supple.   Cardiovascular: Normal rate, regular rhythm and normal heart sounds.    Pulmonary/Chest: Effort normal and breath sounds are clear  Abdominal: Soft. Bowel sounds are normal.   Musculoskeletal: Normal range of motion. + generalized weakness  Neurological: Alert and oriented to person, place, and time.   Skin: Skin is warm and dry. + Lt femoral HD cath  Psychiatric: Normal mood and affect. Behavior is normal.     Recent Labs   Lab 04/21/19  0420 04/22/19  0902 04/25/19  0604   WBC 11.24 10.61 12.15   HGB 7.8* 8.5* 7.5*   HCT 26.3* 28.9* 25.3*    343 421*     Recent Labs   Lab 04/21/19  0420 04/22/19  0520 04/22/19  0902  04/25/19  0604    138 137 136   K 3.8 4.1 4.1 4.1    105 103 99   CO2 27 22* 24 27   BUN 10 16 16 19   CREATININE 3.6* 5.0* 5.3* 5.6*   * 130* 152* 165*   CALCIUM 8.9 9.3 9.3 8.4*   MG 1.8 1.7  --  1.5*   PHOS 3.7 4.4  --  4.2     Recent Labs   Lab 04/21/19  0420 04/22/19  0520   ALBUMIN 1.8* 1.8*      Recent Labs   Lab 04/25/19 2001 04/26/19  0648 04/26/19  1110 04/26/19  1620 04/26/19  2032 04/27/19  0733   POCTGLUCOSE 125* 150* 195* 148* 129* 198*       Assessment and Plan:  Hypotension  -4/27 discontinued Coreg and Irbesartan for systolic blood pressures in the 80s    ESRD (end stage renal disease) on dialysis  peritonitis due to peritoneal dialysis catheter  -Previously PD patient; admitted 2/21 at [facility] for peritonitis, had PD catheter removed for recurrent peritonitis & transitioned to HD. Abx complete for peritonitis  Bilateral IJ tunneled catheters were attempted without success due to thrombus. Now has a temporary dialysis catheter in her left fem that was placed 3/28.  Prior to permacath placement, she had a next day graft placed by Dr. Leger on 3/22, but it was never used and was clotted 1 day post-op. Patient refused to have the graft declotted, so the permacatch was placed at that time.  -4/25 HD TTS, HD today     NSTEMI (non-ST elevated myocardial infarction)  Acute systolic heart failure  -s/p ACS protocol (DAPT, statin, heparin drip stopped)/NSTEMI   -CT surgery/CABG eval revealed, not a candidate due to comorbidities.   -HTS consult as outpatient to discuss advanced options  -LV EF of 25%  -4/25 Continue HD as outpatient for volume removal  -Continue Asa, statin, and plavix daily  -Discontinued Coreg   -Discontinue irbesartan  -F/u with cards, Ndandu      Anemia in chronic kidney disease, on chronic dialysis  -Continue Epo per neph and protonix     Pressure injury of sacral region, stage 2  -Wound care consulted    Debility  - Continue with PT/OT for gait training and  strengthening and restoration of ADL's   - Encourage mobility, OOB in chair, and early ambulation as appropriate  - Fall precautions   - Monitor for bowel and bladder dysfunction  - Monitor for and prevent skin breakdown and pressure ulcers  - Continue DVT prophylaxis with heparin     DM II with neuropathy   - Continue Gabapentin 300 mg bid    Future Appointments   Date Time Provider Department Center   4/29/2019  2:30 PM CARDIAC, PET IMAGING [facility] DAVID Shearer [facility]   5/2/2019  6:00 PM Sung Alonso MD [facility] GENSUKRYSTLE Shearer [facility]   5/6/2019 11:40 AM Calos Yousif MD [facility] CARDIO Beardsley Bulli       [place] Monterroso NP

## 2019-04-28 NOTE — PT/OT/SLP PROGRESS
Physical Therapy  Treatment    Jenni Todd   MRN: [id number]   Admitting Diagnosis: Problem with dialysis access    PT Received On: 04/28/19          Billable Minutes:  Gait Training 0, Therapeutic Activity 15 and Therapeutic Exercise 15=30    Treatment Type: Treatment  PT/PTA: PT     PTA Visit Number: 0       General Precautions: Standard, fall  Orthopedic Precautions: N/A   Braces: N/A    Spiritual, Cultural Beliefs, Latter day Practices, Values that Affect Care: no    Subjective:  Communicated with patient prior to session.  Agreeable to PT prior to breakfast (waiting on tray)  PT returned in PM, patient agreeable to completing session 1542-1552time.    Pain/Comfort  Pain Rating 1: 0/10  Pain Rating 2: 0/10    Objective:  Patient found supine in bed /w friend visiting  PM: patient sitting EOB w/ a different friend visiting       AM-PAC 6 CLICK MOBILITY  Total Score:16    Bed Mobility:  Sit>Supine:not performed  Supine>Sit: w/ HOB elevated and use of side rail w/ CGA, extra time    Transfers:  Sit<>Stand: to/from bed /w 4 WW w/ CGA  Stand Pivot Transfer: not performed  PM: patient stands from bed w/ 4 wheel RW and CGAx1 trial and close SBA x2 trials  Extra time.    Gait:  Unable. Patient not feeling well upon standing.     Therex:  AM session  Quad sets,   Glute sets,   Ankle  Pumps,   hip abduction/adduction,  heelslides,    LAQ   Hip flexion  x20 reps w/ assist as needed      Balance:  Patient stands w/ 4WW and CGA for safety three trials on 10-15 seconds each.  Sitting EOB: bp 89/55 and p99  Standing first trial w/ patient leaning on elbows on rollator arms 86/57 p 108    Additional Treatment:  Blood pressure monitored during session:  Supine 91/62 p 94  Sitting EOB: 90/59 p 98  Standing w/ 4WW at edge of bed: 98/69 p 111  (readings provided to nurseSailaja, when she arrives)    Patient left sitting EOB w/ friend visiting, rollator in front of her. NurseSailaja notified of bp reading and pt sitting EOB w/  friend with call button in reach.    Assessment:  Jenni Todd is a 48 y.o. female with a medical diagnosis of Problem with dialysis access.  Patient participated fairly well. Blood pressure monitored and remained within acceptable limits and patient did not report any symptoms in AM session. Returned in PM and patient participated, but unable to walk, not feeling well. Blood pressure monitored. Patient will benefit from continued physical therapy to address deficits and improve safety and functional mobility. Continue with physical therapy plan of care. .    Rehab identified problem list/impairments: weakness, impaired endurance, impaired sensation, impaired self care skills, impaired functional mobilty, gait instability, impaired balance, decreased coordination, decreased lower extremity function, impaired cardiopulmonary response to activity    Rehab potential is good.    Activity tolerance: Fair    Discharge recommendations: home health PT     Barriers to discharge: Decreased caregiver support    Equipment recommendations: commode     GOALS:   Multidisciplinary Problems     Physical Therapy Goals        Problem: Physical Therapy Goal    Goal Priority Disciplines Outcome Goal Variances Interventions   Physical Therapy Goal     PT, PT/OT Ongoing (interventions implemented as appropriate)     Description:  Goals to be met by: 2019 (2 weeks)    Patient will increase functional independence with mobility by performin. Supine to sit with Modified [place]  2. Sit to supine with Modified [place]  3. Sit to stand transfer with Supervision using Rolling Walker or Rollator  4. Bed to chair transfer with Supervision using Rolling Walker or Rollator  5. Gait  x 150 feet with Supervision using Rolling Walker or Rollator  6. Wheelchair propulsion x100 feet with Supervision using bilateral upper extremities  7. Ascend/Descend 4 inch curb step with Stand-by Assistance using Rolling Walker or  Rollator  8. Stand for 3 minutes with Stand-by Assistance using Rolling Walker or Rollator while performing an activity  9. Lower extremity exercise program x30 reps per handout, with assistance as needed                        PLAN:    Patient to be seen 5 x/week  to address the above listed problems via gait training, therapeutic activities, therapeutic exercises  Plan of Care expires: 05/23/19  Plan of Care reviewed with: patient    Katarzyna SULEMA Rangel, PT  04/28/2019

## 2019-04-29 NOTE — PT/OT/SLP PROGRESS
Occupational Therapy  Treatment    Jenni Tdod   MRN: [id number]   Admitting Diagnosis: Problem with dialysis access    OT Date of Treatment: 04/29/19       Billable Minutes:   self-care 15 and Therapeutic Exercise 26    General Precautions: Standard, fall(cardiac)  Orthopedic Precautions: N/A  Braces: N/A    Spiritual, Cultural Beliefs, Amish Practices, Values that Affect Care: no    Subjective:  Communicated with nurse prior to session.  Pt. Reported she has been having issues going to the bathroom multiple times since the weekend.     Pain/Comfort  Pain Rating 1: 0/10  Pain Rating 2: 0/10    Objective: seated in w/c in gym       Occupational Performance:    Bed Mobility:    · Patient completed Sit to Supine with modified independence     Functional Mobility/Transfers:  · Patient completed Sit <> Stand Transfer with minimum assistance  with  rolling walker   · Patient completed Bed <> Chair Transfer using Stand Pivot technique with contact guard assistance with rolling walker  · Patient completed Toilet Transfer Stand Pivot technique with contact guard assistance with  bed rail for support  · Functional Mobility: not tested    Activities of Daily Living:  · Toileting: moderate assistance with assist for clothing management up and completely down    [facility] 6 Click:  [facility] Total Score: 18    OT Exercises: AROM with 1 # weights in BUE for all major planes of BUE motion x 2 sets 10 reps  UE Ergometer x 10 minutes with min resistance    Additional Treatment:  Pt. Issued HEP for BUE to assist with improving endurance  Pt. Educated on safety with transfers    Patient left left sidelying with call button in reach and nurse notified    ASSESSMENT:  Jenni Todd is a 48 y.o. female with a medical diagnosis of Problem with dialysis access and presents with limitation sin self-care skills as well as functional mobility and endurance.  Pt. Transfers noted to be improved on this date as reginald as able to  participate in more activities during OT session on this date.    Rehab identified problem list/impairments: impaired endurance, impaired self care skills, impaired functional mobilty, impaired cardiopulmonary response to activity    Rehab potential is good    Activity tolerance: Fair    Discharge recommendations: home health OT(may require S initially)     Barriers to discharge: Decreased caregiver support     Equipment recommendations: commode     GOALS:   Multidisciplinary Problems     Occupational Therapy Goals        Problem: Occupational Therapy Goal    Goal Priority Disciplines Outcome Interventions   Occupational Therapy Goal     OT, PT/OT     Description:  Goals to be met by: 05-06-19     Patient will increase functional independence with ADLs by performing:    UE Dressing with Mod I  LE Dressing with Mod I.  Grooming while seated with Mod I .  Toileting from bedside commode with Mod I for hygiene and clothing management.   Bathing from  edge of bed with SBA.  Supine to sit with Modified [place].  Stand pivot transfers with Mod I .  Toilet transfer to bedside commode with Mod I.  Pt. To be I with HEP for BUE to improve endurance level                      Plan:  Patient to be seen 5 x/week to address the above listed problems via self-care/home management, therapeutic activities, therapeutic exercises  Plan of Care expires: 05/23/19  Plan of Care reviewed with: patient    SABRA Mcguire  04/29/2019

## 2019-04-29 NOTE — PLAN OF CARE
Problem: Occupational Therapy Goal  Goal: Occupational Therapy Goal  Goals to be met by: 05-06-19     Patient will increase functional independence with ADLs by performing:    UE Dressing with Mod I  LE Dressing with Mod I.  Grooming while seated with Mod I .  Toileting from bedside commode with Mod I for hygiene and clothing management.   Bathing from  edge of bed with SBA.  Supine to sit with Modified [place].  Stand pivot transfers with Mod I .  Toilet transfer to bedside commode with Mod I.  Pt. To be I with HEP for BUE to improve endurance level     Pt. Tolerated session fairly

## 2019-04-29 NOTE — PROGRESS NOTES
[facility]                                  Skilled Nursing Facility                   Progress Note     Admit Date: 4/22/2019  JESSY 5/13/2019  Principal Problem:  Problem with dialysis access   HPI obtained from patient interview and chart review     Chief Complaint: Patient reports feeling bad r/t to hypotension    HPI: 47 yo with history of coronary artery disease, chronic systolic and diastolic heart failure (EF 25%), and ESRF on peritoneal dialysis since 2004 presented to [facility] on 2/21 with abdominal pain. She was initiated on treatment for peritonitis and had removal of peritoneal catheter. She developed active GI bleed and associated encephalopathy. Patient had emergent EGD 3/13 that showed erosive esophagitis with small ulcer and clotted blood in the gastric fundus. The following day she had repeat EGD that found one non-bleeding cratered gastric ulcer with clean ulcer base. She was then transfer to [facility] ICU for vascular surgery assistance in dialysis access placement. She had RUE graft 3/22 placed, but it clotted. She has poor vascular options and vascular surgery recommended that she continue with peritoneal access if possible. She is currently being dialyzed with permacath in left groin placed 3/28. She developed NSTEMI day after groin catheter placement. Cardiology recommended goal directed medical therapy. Patient was transferred here to [facility] on 4/4 for debility. She returned to [facility] ER on 4/6 after her dialysis access did not work. TPA was instilled in her permacath and she was able to undergo a full dialysis session. Patient complains of abdominal distension that has been attributed to extra fluid. She has had some dyspnea on exertion that is somewhat worse in the last few weeks, but denies cough or wheezing. She denies chest pain, palpitations, nausea, or vomiting.      Interval Hx: During f/u of care,  patient continued to be treated at [facility] with PT and OT to improve functional status and ability to perform ADLs. Pt and nurse reporting that Ms Todd is not feeling well due to her low blood pressures. Lab review revealed mg at 1.3, mild replacement due to renal failure. Bun/cr 20/5.2, Dialysis TTS.     Past Medical History: Patient has a past medical history of Abnormal finding on Pap smear, HPV DNA positive (), Anemia associated with chronic renal failure, Blood type B+, Bulging discs - symptomatic , CAD (coronary artery disease), Cardiomyopathy (3/13/2019), Diabetes mellitus, type 2, ESRD (end stage renal disease) (2004), FSGS (focal segmental glomerulosclerosis), Hyperlipidemia, Hypertension (), Neuropathy, NSTEMI (non-ST elevated myocardial infarction) (3/29/2019), Obesity, Secondary hyperparathyroidism, renal, TIA (transient ischemic attack), and Uterine fibroid.    Past Surgical History: Patient has a past surgical history that includes Peritoneal catheter insertion; Umbilical hernia repair;  section, classic; Dialysis fistula creation; Cardiac catheterization; Incision and drainage of wound (Left, ); Open reduction and internal fixation (ORIF) of injury of hip (Left, 2018); Colonoscopy (N/A, 2018); Esophagogastroduodenoscopy (N/A, 3/13/2019); Esophagogastroduodenoscopy (N/A, 3/14/2019); Peritoneal catheter removal (N/A, 3/14/2019); Insertion of tunneled central venous hemodialysis catheter (N/A, 3/18/2019); Phlebography (Right, 3/20/2019); Placement of arteriovenous graft (Right, 3/22/2019); Insertion of tunneled central venous hemodialysis catheter (N/A, 3/28/2019); Left heart catheterization (Left, 4/15/2019); Insertion of intra-aortic balloon assist device (4/15/2019); and Coronary angiography (N/A, 4/15/2019).    Social History: Patient reports that she has never smoked. She has never used smokeless tobacco. She reports that she does not drink alcohol or use  drugs.    Family History: family history includes Cancer in her maternal grandmother and paternal grandfather; Diabetes in her maternal aunt and paternal aunt.    Allergies: Patient is allergic to clindamycin; flagyl [metronidazole hcl]; and metronidazole.    ROS  Constitutional: Negative for fever and malaise/fatigue.   Eyes: Negative for blurred vision, double vision and discharge.   Respiratory: Negative for cough, shortness of breath and wheezing.    Cardiovascular: Negative for chest pain, palpitations, claudication, leg swelling and PND.   Gastrointestinal: Negative for abdominal pain, constipation, diarrhea, nausea and vomiting.   Genitourinary: Negative for dysuria, frequency and urgency.   Musculoskeletal:  + generalized weakness. Negative for back pain and myalgias.   Skin: Negative for itching and rash.   Neurological: Negative for dizziness, speech change, seizures, and headaches.   Psychiatric/Behavioral: Negative for depression. The patient is not nervous/anxious.      PEx  Temp:  [98.6 °F (37 °C)]   Pulse:  []   Resp:  [18]   BP: (86-98)/(59-61)   SpO2:  [95 %-97 %]   Body mass index is 33.01 kg/m².      Constitutional: Patient appears well-developed and well-nourished.   HENT:   Head: Normocephalic and atraumatic.   Eyes: Pupils are equal, round, and reactive to light.   Neck: Normal range of motion. Neck supple.   Cardiovascular: Normal rate, regular rhythm and normal heart sounds.    Pulmonary/Chest: Effort normal and breath sounds are clear  Abdominal: Soft. Bowel sounds are normal.   Musculoskeletal: Normal range of motion. + generalized weakness  Neurological: Alert and oriented to person, place, and time.   Skin: Skin is warm and dry. + Lt femoral HD cath  Psychiatric: Normal mood and affect. Behavior is normal.     Recent Labs   Lab 04/25/19  0604 04/29/19  0525   WBC 12.15 12.79*   HGB 7.5* 7.5*   HCT 25.3* 26.2*   * 501*     Recent Labs   Lab 04/25/19  0604 04/29/19  0525   NA  136 143   K 4.1 4.0   CL 99 101   CO2 27 27   BUN 19 20   CREATININE 5.6* 5.2*   * 157*   CALCIUM 8.4* 8.1*   MG 1.5* 1.3*   PHOS 4.2 3.7     No results for input(s): ALKPHOS, ALT, AST, ALBUMIN, PROT, BILITOT, INR in the last 168 hours.   Recent Labs   Lab 04/28/19  1200 04/28/19  1703 04/28/19  2041 04/29/19  0731 04/29/19  1125 04/29/19  1610   POCTGLUCOSE 216* 173* 154* 161* 194* 144*       Assessment and Plan:  Hypotension  -4/27 discontinued Coreg and Irbesartan for systolic blood pressures in the 80s  -4/29 Increased midodrine to 10 mg bid    ESRD (end stage renal disease) on dialysis  peritonitis due to peritoneal dialysis catheter  -Previously PD patient; admitted 2/21 at [facility] for peritonitis, had PD catheter removed for recurrent peritonitis & transitioned to HD. Abx complete for peritonitis  Bilateral IJ tunneled catheters were attempted without success due to thrombus. Now has a temporary dialysis catheter in her left fem that was placed 3/28.  Prior to permacath placement, she had a next day graft placed by Dr. Leger on 3/22, but it was never used and was clotted 1 day post-op. Patient refused to have the graft declotted, so the permacatch was placed at that time.  -4/25 HD TTS, HD today  -4/29 Reviewed Bun/cr 20/5.2, Scheduled Dialysis on TTS.     Hypomagnesemia  -4/29 initiated mag oxide tab 400 mg daily    NSTEMI (non-ST elevated myocardial infarction)  Acute systolic heart failure  -s/p ACS protocol (DAPT, statin, heparin drip stopped)/NSTEMI   -CT surgery/CABG eval revealed, not a candidate due to comorbidities.   -[facility] [place] consult as outpatient to discuss advanced options  -LV EF of 25%  -4/25 Continue HD as outpatient for volume removal  -Continue Asa, statin, and plavix daily  -Discontinued Coreg   -Discontinue irbesartan  -F/u with cards, Ndandu      Anemia in chronic kidney disease, on chronic dialysis  -Continue Epo per neph and protonix     Pressure injury of sacral region, stage  2  -Wound care consulted    Debility  - Continue with PT/OT for gait training and strengthening and restoration of ADL's   - Encourage mobility, OOB in chair, and early ambulation as appropriate  - Fall precautions   - Monitor for bowel and bladder dysfunction  - Monitor for and prevent skin breakdown and pressure ulcers  - Continue DVT prophylaxis with heparin     DM II with neuropathy   - Continue Gabapentin 300 mg bid    Future Appointments   Date Time Provider Department Center   5/2/2019  6:00 PM Sung Alonso MD [facility]   5/6/2019 11:40 AM Calos Yousif MD [facility] CARDIO Macie Bulli       [place] Monterroso NP

## 2019-04-29 NOTE — PROGRESS NOTES
Wound care follow up.     Wounds to abdomen appear clean. No odor or purulence noted. Wounds appear slightly smaller and are improving slowly. Wounds cleansed and repacked with aquacel ag rope.     Patient tolerated well.     Wound care to follow PRN.  Cammy Ferrara RN [facility]   [phone]             04/29/19 1705        Incision/Site 03/19/19 Right Abdomen   Date First Assessed: 03/19/19   Side: Right  Location: Abdomen   Wound Image    Incision WDL ex   Dressing Appearance Intact   Drainage Amount Small   Drainage Characteristics/Odor Serosanguineous   Appearance Pink   Red (%), Wound Tissue Color 90 %   Yellow (%), Wound Tissue Color 10 %   Periwound Area Scar tissue   Wound Edges Open   Wound Length (cm) 0.5 cm   Wound Width (cm) 0.5 cm   Wound Depth (cm) 0.3 cm   Wound Volume (cm^3) 0.08 cm^3   Wound Surface Area (cm^2) 0.25 cm^2   Care Cleansed with:;Wound cleanser   Dressing Removed;Applied;Changed;Hydrofiber;Silver;Foam   Dressing Change Due 05/01/19        Incision/Site 03/19/19 Abdomen lower   Date First Assessed: 03/19/19   Location: Abdomen  Orientation: lower   Wound Image    Incision WDL ex   Dressing Appearance Dry   Drainage Amount Moderate   Drainage Characteristics/Odor Serosanguineous   Appearance Pink;Smooth   Red (%), Wound Tissue Color 70 %   Yellow (%), Wound Tissue Color 30 %   Periwound Area Scar tissue   Wound Edges Open   Wound Length (cm) 0.6 cm   Wound Width (cm) 2.2 cm   Wound Depth (cm) 3.8 cm   Wound Volume (cm^3) 5.02 cm^3   Wound Surface Area (cm^2) 1.32 cm^2   Care Cleansed with:;Sterile normal saline   Dressing Removed;Applied;Changed;Silver;Hydrofiber;Foam   Dressing Change Due 05/01/19        Incision/Site 03/14/19 1445 Left Abdomen   Date First Assessed/Time First Assessed: 03/14/19 1445   Side: Left  Location: Abdomen   Wound Image    Incision WDL ex   Dressing Appearance Intact   Drainage Amount Small   Drainage Characteristics/Odor Serosanguineous   Appearance Pink;Fibrin    Red (%), Wound Tissue Color 80 %   Yellow (%), Wound Tissue Color 20 %   Periwound Area Scar tissue   Wound Edges Open   Wound Length (cm) 0.6 cm   Wound Width (cm) 1.8 cm   Wound Depth (cm) 1 cm   Wound Volume (cm^3) 1.08 cm^3   Wound Surface Area (cm^2) 1.08 cm^2   Care Cleansed with:;Sterile normal saline   Dressing Removed;Applied;Changed;Hydrofiber;Silver;Foam   Dressing Change Due 04/29/19

## 2019-04-29 NOTE — PLAN OF CARE
Problem: Adult Inpatient Plan of Care  Goal: Plan of Care Review  Outcome: Ongoing (interventions implemented as appropriate)     04/28/19 1926   Plan of Care Review   Plan of Care Reviewed With patient       Problem: Fall Injury Risk  Goal: Absence of Fall and Fall-Related Injury  Outcome: Ongoing (interventions implemented as appropriate)  Intervention: Identify and Manage Contributors to Fall Injury Risk     04/28/19 1926   Manage Acute Allergic Reaction   Medication Review/Management medications reviewed   Identify and Manage Contributors to Fall Injury Risk   Self-Care Promotion independence encouraged         Problem: Skin Injury Risk Increased  Goal: Skin Health and Integrity  Outcome: Ongoing (interventions implemented as appropriate)  Intervention: Optimize Skin Protection     04/28/19 1926   Prevent Additional Skin Injury   Pressure Reduction Devices elbow protectors utilized;foam padding utilized;positioning supports utilized   Pressure Reduction Techniques frequent weight shift encouraged;heels elevated off bed;weight shift assistance provided   Monitor and Manage Hypervolemia   Skin Protection incontinence pads utilized         Comments: Patient monitored every 1 to 2 hours for pain and safety.  Safety maintained.  Blood glucose monitored  before meals and bedtime.  Patient instructed to call for assistance.Call  Light and persoanl items in reach.

## 2019-04-29 NOTE — PROGRESS NOTES
PATIENT'S BLOOD PRESSURE RUNS LOW 86/54  86/61   PULSE 86  .  ZOILA AWARE.  PATIENT TAKING MIDODRINE 20 MG ON DAYS SHE GOES TO DIALYSIS,.CALL LIGHT IN REACH

## 2019-04-29 NOTE — PT/OT/SLP PROGRESS
Physical Therapy  Treatment    Jenni Todd   MRN: [id number]   Admitting Diagnosis: Problem with dialysis access    PT Received On: 04/29/19        Billable Minutes:  Therapeutic Activity 15, Therapeutic Exercise 13 and Total Time 28    Treatment Type: Treatment  PT/PTA: PT     PTA Visit Number: 0       General Precautions: Standard, fall  Orthopedic Precautions: N/A   Braces: N/A    Spiritual, Cultural Beliefs, Zoroastrianism Practices, Values that Affect Care: no    Subjective:  Communicated with patient prior to session.  Pt agreeable to session but reporting weakness/fatigue.     Pain/Comfort  Pain Rating 1: (did not rate numerically)  Location - Side 1: Bilateral  Location - Orientation 1: generalized  Location 1: abdomen  Pain Addressed 1: Reposition, Distraction    Objective:  Patient found sitting in w/c.       AM-PAC 6 CLICK MOBILITY  Total Score:14    Bed Mobility:  Not performed    Transfers:  Sit<>Stand: to/from w/c (3 trials) w/ 4WW and Min/CGA to rise/control descent  Cueing for forward lean    Gait:  Pt declined due to fatigue/weakness     Wheelchair Mobility:  Patient propels w/c 50ft w/ BUE and SBA  Limited by fatigue     Therex:  Seated BLE therex 2x10 reps (GS, HF, LAQ, AP)    Balance:  Static stand 3 trials (~45s each) w/ 4WW and CGA, cues to keep eyes open  Blood Pressure:  Seated 83/57  Standing trials- 94/63mmHg, 94/62mmHg, and 109/67mmHg    Patient left up in chair with call button in reach.    Assessment:  Jenni Todd is a 48 y.o. female with a medical diagnosis of Problem with dialysis access.  Pt continues to be limited t/o sessions by weakness, fatigue, and low BP. She has been unable to ambulate over the past few days due to low BP- PTA pt was ambulatory w/ 4WW. Goals were revised due to slow progress. Pt will continue PT POC.    Rehab identified problem list/impairments: weakness, impaired endurance, impaired sensation, impaired self care skills, impaired functional mobilty, gait  instability, impaired balance, decreased coordination, decreased lower extremity function, impaired cardiopulmonary response to activity    Rehab potential is Fair.    Activity tolerance: Fair    Discharge recommendations: home health PT     Barriers to discharge: Decreased caregiver support    Equipment recommendations: commode     GOALS:   Multidisciplinary Problems     Physical Therapy Goals        Problem: Physical Therapy Goal    Goal Priority Disciplines Outcome Goal Variances Interventions   Physical Therapy Goal     PT, PT/OT Revised     Description:  Goals to be met by: 2019 (2 weeks)    Patient will increase functional independence with mobility by performin. Supine to sit with Modified [place]  2. Sit to supine with Modified [place]  3. Sit to stand transfer with SBA  4. Bed to chair transfer with SBA using Rolling Walker or Rollator  5. Gait  x 30 feet with SBA using Rolling Walker or Rollator  6. Wheelchair propulsion x100 feet with Supervision using bilateral upper extremities  7. Ascend/Descend 4 inch curb step with Stand-by Assistance using Rolling Walker or Rollator (on hold for now due to medical status)  8. Stand for 2 minutes with Stand-by Assistance using Rolling Walker or Rollator   9. Lower extremity exercise program x30 reps per handout, with assistance as needed                         PLAN:    Patient to be seen 5 x/week  to address the above listed problems via gait training, therapeutic activities, therapeutic exercises  Plan of Care expires: 19  Plan of Care reviewed with: patient    Brook Donnelly, PT  2019

## 2019-04-29 NOTE — PLAN OF CARE
Problem: Physical Therapy Goal  Goal: Physical Therapy Goal  Goals to be met by: 2019 (2 weeks)    Patient will increase functional independence with mobility by performin. Supine to sit with Modified [place]  2. Sit to supine with Modified [place]  3. Sit to stand transfer with SBA  4. Bed to chair transfer with SBA using Rolling Walker or Rollator  5. Gait  x 30 feet with SBA using Rolling Walker or Rollator  6. Wheelchair propulsion x100 feet with Supervision using bilateral upper extremities  7. Ascend/Descend 4 inch curb step with Stand-by Assistance using Rolling Walker or Rollator (on hold for now due to medical status)  8. Stand for 2 minutes with Stand-by Assistance using Rolling Walker or Rollator   9. Lower extremity exercise program x30 reps per handout, with assistance as needed       Outcome: Revised  Goals revised due to pt's medical status and current LOF. Pt will continue PT POC.    Brook Donnelly, PT  2019

## 2019-04-30 NOTE — PROGRESS NOTES
[facility]                                  Skilled Nursing Facility                   Progress Note     Admit Date: 4/22/2019  JESSY 5/13/2019  Principal Problem:  Problem with dialysis access   HPI obtained from patient interview and chart review     Chief Complaint: Nurse reporting that patient is refusing heparin prophylaxis    HPI: 47 yo with history of coronary artery disease, chronic systolic and diastolic heart failure (EF 25%), and ESRF on peritoneal dialysis since 2004 presented to [facility] on 2/21 with abdominal pain. She was initiated on treatment for peritonitis and had removal of peritoneal catheter. She developed active GI bleed and associated encephalopathy. Patient had emergent EGD 3/13 that showed erosive esophagitis with small ulcer and clotted blood in the gastric fundus. The following day she had repeat EGD that found one non-bleeding cratered gastric ulcer with clean ulcer base. She was then transfer to [facility] ICU for vascular surgery assistance in dialysis access placement. She had RUE graft 3/22 placed, but it clotted. She has poor vascular options and vascular surgery recommended that she continue with peritoneal access if possible. She is currently being dialyzed with permacath in left groin placed 3/28. She developed NSTEMI day after groin catheter placement. Cardiology recommended goal directed medical therapy. Patient was transferred here to [facility] on 4/4 for debility. She returned to [facility] ER on 4/6 after her dialysis access did not work. TPA was instilled in her permacath and she was able to undergo a full dialysis session. Patient complains of abdominal distension that has been attributed to extra fluid. She has had some dyspnea on exertion that is somewhat worse in the last few weeks, but denies cough or wheezing. She denies chest pain, palpitations, nausea, or vomiting.      Interval Hx: During  f/u of care, patient continued to be treated at [facility] with PT and OT to improve functional status and ability to perform ADLs. Nurse reporting that the patient is refusing heparin prophylaxis. Upon assessment the patient is reporting that she does not want to take heparin due to her haveing an acute anemic episode requiring transition to ED on a previous visit. Patient educated on the importance of dvt prophylaxis with decrease mobility, she has decided to continue to refuse heparin. Additionally, the patients abd distention is greatly improved and the patient is looking and feeling better. Hypotension is improved today, currently at 108/71, s/p increasing midodrine dosing yesterday.     Past Medical History: Patient has a past medical history of Abnormal finding on Pap smear, HPV DNA positive (), Anemia associated with chronic renal failure, Blood type B+, Bulging discs - symptomatic , CAD (coronary artery disease), Cardiomyopathy (3/13/2019), Diabetes mellitus, type 2, ESRD (end stage renal disease) (2004), FSGS (focal segmental glomerulosclerosis), Hyperlipidemia, Hypertension (), Neuropathy, NSTEMI (non-ST elevated myocardial infarction) (3/29/2019), Obesity, Secondary hyperparathyroidism, renal, TIA (transient ischemic attack), and Uterine fibroid.    Past Surgical History: Patient has a past surgical history that includes Peritoneal catheter insertion; Umbilical hernia repair;  section, classic; Dialysis fistula creation; Cardiac catheterization; Incision and drainage of wound (Left, ); Open reduction and internal fixation (ORIF) of injury of hip (Left, 2018); Colonoscopy (N/A, 2018); Esophagogastroduodenoscopy (N/A, 3/13/2019); Esophagogastroduodenoscopy (N/A, 3/14/2019); Peritoneal catheter removal (N/A, 3/14/2019); Insertion of tunneled central venous hemodialysis catheter (N/A, 3/18/2019); Phlebography (Right, 3/20/2019); Placement of arteriovenous graft (Right,  3/22/2019); Insertion of tunneled central venous hemodialysis catheter (N/A, 3/28/2019); Left heart catheterization (Left, 4/15/2019); Insertion of intra-aortic balloon assist device (4/15/2019); and Coronary angiography (N/A, 4/15/2019).    Social History: Patient reports that she has never smoked. She has never used smokeless tobacco. She reports that she does not drink alcohol or use drugs.    Family History: family history includes Cancer in her maternal grandmother and paternal grandfather; Diabetes in her maternal aunt and paternal aunt.    Allergies: Patient is allergic to clindamycin; flagyl [metronidazole hcl]; and metronidazole.    ROS  Constitutional: Negative for fever and malaise/fatigue.   Eyes: Negative for blurred vision, double vision and discharge.   Respiratory: Negative for cough, shortness of breath and wheezing.    Cardiovascular: Negative for chest pain, palpitations, claudication, leg swelling and PND.   Gastrointestinal: Negative for abdominal pain, constipation, diarrhea, nausea and vomiting.   Genitourinary: Negative for dysuria, frequency and urgency.   Musculoskeletal:  + generalized weakness. Negative for back pain and myalgias.   Skin: Negative for itching and rash.   Neurological: Negative for dizziness, speech change, seizures, and headaches.   Psychiatric/Behavioral: Negative for depression. The patient is not nervous/anxious.      PEx  Temp:  [98.1 °F (36.7 °C)-98.4 °F (36.9 °C)]   Pulse:  []   Resp:  [17-18]   BP: ()/(64-71)   SpO2:  [97 %-100 %]   Body mass index is 33.01 kg/m².      Constitutional: Patient appears well-developed and well-nourished.   HENT:   Head: Normocephalic and atraumatic.   Eyes: Pupils are equal, round, and reactive to light.   Neck: Normal range of motion. Neck supple.   Cardiovascular: Normal rate, regular rhythm and normal heart sounds.    Pulmonary/Chest: Effort normal and breath sounds are clear  Abdominal: Soft. Bowel sounds are normal.    Musculoskeletal: Normal range of motion. + generalized weakness  Neurological: Alert and oriented to person, place, and time.   Skin: Skin is warm and dry. + Lt femoral HD cath  Psychiatric: Normal mood and affect. Behavior is normal.     Recent Labs   Lab 04/25/19  0604 04/29/19  0525   WBC 12.15 12.79*   HGB 7.5* 7.5*   HCT 25.3* 26.2*   * 501*     Recent Labs   Lab 04/25/19  0604 04/29/19  0525    143   K 4.1 4.0   CL 99 101   CO2 27 27   BUN 19 20   CREATININE 5.6* 5.2*   * 157*   CALCIUM 8.4* 8.1*   MG 1.5* 1.3*   PHOS 4.2 3.7     No results for input(s): ALKPHOS, ALT, AST, ALBUMIN, PROT, BILITOT, INR in the last 168 hours.   Recent Labs   Lab 04/29/19  0731 04/29/19  1125 04/29/19  1610 04/29/19  2036 04/30/19  0656 04/30/19  1605   POCTGLUCOSE 161* 194* 144* 236* 193* 222*       Assessment and Plan:  Hypotension  -4/27 discontinued Coreg and Irbesartan for systolic blood pressures in the 80s  -4/29 Increased midodrine to 10 mg bid  -4/30 Hypotension is improved today, currently at 108/71, s/p increasing midodrine dosing yesterday.     ESRD (end stage renal disease) on dialysis  peritonitis due to peritoneal dialysis catheter  -Previously PD patient; admitted 2/21 at [facility] for peritonitis, had PD catheter removed for recurrent peritonitis & transitioned to HD. Abx complete for peritonitis  Bilateral IJ tunneled catheters were attempted without success due to thrombus. Now has a temporary dialysis catheter in her left fem that was placed 3/28.  Prior to permacath placement, she had a next day graft placed by Dr. Leger on 3/22, but it was never used and was clotted 1 day post-op. Patient refused to have the graft declotted, so the permacatch was placed at that time.  -4/25 HD TTS, HD today  -4/29 Reviewed Bun/cr 20/5.2, Scheduled Dialysis on TTS.   -4/30 abd distention is greatly improved with HD treatments.     Debility  - Continue with PT/OT for gait training and strengthening and  restoration of ADL's   - Encourage mobility, OOB in chair, and early ambulation as appropriate  - Fall precautions   - Monitor for bowel and bladder dysfunction  - Monitor for and prevent skin breakdown and pressure ulcers  - Continue DVT prophylaxis with heparin   -4/30 Patient is reporting that she does not want to take heparin due to her haveing an acute anemic episode requiring transition to ED in the past. Patient educated on the importance of dvt prophylaxis with decrease mobility, she has decided to continue to refuse heparin.     Hypomagnesemia  -4/29 initiated mag oxide tab 400 mg daily    NSTEMI (non-ST elevated myocardial infarction)  Acute systolic heart failure  -s/p ACS protocol (DAPT, statin, heparin drip stopped)/NSTEMI   -CT surgery/CABG eval revealed, not a candidate due to comorbidities.   -HTS consult as outpatient to discuss advanced options  -LV EF of 25%  -4/25 Continue HD as outpatient for volume removal  -Continue Asa, statin, and plavix daily  -Discontinued Coreg   -Discontinue irbesartan  -F/u with cards, Ndandu      Anemia in chronic kidney disease, on chronic dialysis  -Continue Epo per neph and protonix     Pressure injury of sacral region, stage 2  -Wound care consulted    DM II with neuropathy   - Continue Gabapentin 300 mg bid    Future Appointments   Date Time Provider Department Center   5/2/2019  6:00 PM Sung Alonso MD [facility] CHELE Shearer wilmer   5/6/2019 11:40 AM Calos Yousif MD [facility] CARDIO Macie Monterroso NP

## 2019-04-30 NOTE — PLAN OF CARE
Recommendations    Continue 2000 diabetic diet, 1500 ml fluid restriciton,   recommend dc renal restriction per pt request and due to food preferences,   RD following  Goals: PO to meet 85% of EEN by next RD visit  Nutrition Goal Status: new         Nutrition Problem  Increased nutrient needs, protein     Related to (etiology):   Wound healing     Signs and Symptoms (as evidenced by):   Stage 2 pressure ulcer     Interventions:  Collaboration with other providers     Nutrition Diagnosis Status:   Continues

## 2019-04-30 NOTE — PROGRESS NOTES
"[facility] - Skilled Nursing Care  Adult Nutrition  Progress Note    SUMMARY       Recommendations    Continue 2000 diabetic diet, 1500 ml fluid restriciton,   recommend dc renal restriction per pt request and due to food preferences,   RD following  Goals: PO to meet 85% of EEN by next RD visit  Nutrition Goal Status: new  Communication of RD Recs: plan of care    Reason for Assessment    Reason For Assessment: consult  Diagnosis: (Debility, sp septic shock)  Relevant Medical History: CAD, CHF, DM2, ESRD, HLD, HTN  Interdisciplinary Rounds: did not attend  General Information Comments: Pt with fair appetite and intake. Denies any issues. Pt continues to appear nourished without physical signs of malnutrition at this time.   Nutrition Discharge Planning: PO to meet 85% of needs by next RD follow up.     Nutrition Risk Screen    Nutrition Risk Screen: no indicators present    Nutrition/Diet History    Patient Reported Diet/Restrictions/Preferences: diabetic diet  Food Preferences: no fish, no cooked carrots, no mixed vegetables  Spiritual, Cultural Beliefs, Congregation Practices, Values that Affect Care: no  Supplemental Drinks or Food Habits: (refuses ONS)  Food Allergies: NKFA  Factors Affecting Nutritional Intake: None identified at this time    Anthropometrics    Temp: 98.1 °F (36.7 °C)  Height Method: Stated  Height: 5' 7" (170.2 cm)  Height (inches): 67 in  Weight Method: Standard Scale  Weight: 95.6 kg (210 lb 12.2 oz)  Weight (lb): 210.76 lb  Ideal Body Weight (IBW), Female: 135 lb  % Ideal Body Weight, Female (lb): 164.28 lb  BMI (Calculated): 34.8  BMI Grade: 30 - 34.9- obesity - grade I  Weight Loss: unintentional  Usual Body Weight (UBW), k kg  % Usual Body Weight: 98.83  % Weight Change From Usual Weight: -1.37 %     Lab/Procedures/Meds    Pertinent Labs Reviewed: reviewed  Pertinent Labs Comments: Crea 5.2, GFR 10, Glu 157, [place] 8.1, Mg 1.3   Pertinent Medications Reviewed: reviewed  Pertinent " Medications Comments: pantoprazole, sevelamer, gabapentin, ASA, statin, renal vitamin,    Estimated/Assessed Needs    Weight Used For Calorie Calculations: 100.6 kg (221 lb 12.5 oz)  Energy Calorie Requirements (kcal): 2078  Energy Need Method: [place]-St Jeor(x 1.25 (PAL))  Protein Requirements: 92g(x 1.5 x IBW kg)  Weight Used For Protein Calculations: 61.4 kg (135 lb 5.8 oz)  Fluid Requirements (mL): 1500 ml per MD    RDA Method (mL): 2078  CHO Requirement: 260 grams daily    Nutrition Prescription Ordered    Current Diet Order: 2000 diabetic , renal, 1500 ml fluid restriction  Oral Nutrition Supplement: none(pt refuses)    Evaluation of Received Nutrient/Fluid Intake    Energy Calories Required: meeting needs  Protein Required: meeting needs  Fluid Required: meeting needs  Tolerance: tolerating  % Intake of Estimated Energy Needs: 50 - 75 %  % Meal Intake: 50 - 75 %    Nutrition Risk    Level of Risk/Frequency of Follow-up: low     Assessment and Plan     Nutrition Problem  Increased nutrient needs, protein    Related to (etiology):   Wound healing    Signs and Symptoms (as evidenced by):   Stage 2 pressure ulcer    Interventions:  Collaboration with other providers    Nutrition Diagnosis Status:   Continues     Monitor and Evaluation    Food and Nutrient Adminstration: diet order  Anthropometric Measurements: weight change  Biochemical Data, Medical Tests and Procedures: electrolyte and renal panel, gastrointestinal profile, glucose/endocrine profile  Nutrition-Focused Physical Findings: overall appearance     Nutrition Follow-Up    RD Follow-up?: Yes

## 2019-04-30 NOTE — PT/OT/SLP PROGRESS
Occupational Therapy  Treatment    Jenni Todd   MRN: [id number]   Admitting Diagnosis: Problem with dialysis access    OT Date of Treatment: 04/30/19       Billable Minutes:  Self Care/Home Management 43    General Precautions: Standard, fall(cardiac)  Orthopedic Precautions: N/A  Braces: N/A    Spiritual, Cultural Beliefs, Sabianism Practices, Values that Affect Care: no    Subjective:  Communicated with nurse prior to session.  Pt. Reported she felt a little better today.     Pain/Comfort  Pain Rating 1: 0/10  Pain Rating 2: 0/10    Objective:  Patient found with: (left sidelying in bed)    Occupational Performance:    Bed Mobility:    · Patient completed Supine to Sit with modified independence     Functional Mobility/Transfers:  · Patient completed Sit <> Stand Transfer with contact guard assistance  with  no assistive device   · Patient completed Bed <> Chair Transfer using Stand Pivot technique with contact guard assistance with no assistive device  · Functional Mobility: not tested    Activities of Daily Living:  · Grooming: modified independence seated at sink  · Bathing: minimum assistance sponge bath seated at sink  · Upper Body Dressing: minimum assistance to guide pull over top down back  · Lower Body Dressing: moderate assistance with assist to don socks as well as manage pants over hips in stand    AMPA 6 Click:  [facility] Total Score: 18    OT Exercises: n/a    Additional Treatment:  Pt. Educated on safety with ADL task performance and importance of locking w/c when performing ADL tasks at sink  Vitals this am were as follows:  /71; 02 sats 100%;      Patient left up in chair with seated at sink brushing hair with nursing present    ASSESSMENT:  Jenni Todd is a 48 y.o. female with a medical diagnosis of Problem with dialysis access and presents with deficits in self-care skills, functional mobility and endurance.  Pt. Noted to have improvements with endurance and transfers on  this date and BP noted to be improved this am.     Rehab identified problem list/impairments: impaired endurance, impaired self care skills, impaired functional mobilty, impaired cardiopulmonary response to activity    Rehab potential is good    Activity tolerance: Good    Discharge recommendations: home health OT(may require S initially)     Barriers to discharge: Decreased caregiver support     Equipment recommendations: commode     GOALS:   Multidisciplinary Problems     Occupational Therapy Goals        Problem: Occupational Therapy Goal    Goal Priority Disciplines Outcome Interventions   Occupational Therapy Goal     OT, PT/OT     Description:  Goals to be met by: 05-06-19     Patient will increase functional independence with ADLs by performing:    UE Dressing with Mod I  LE Dressing with Mod I.  Grooming while seated with Mod I .  Toileting from bedside commode with Mod I for hygiene and clothing management.   Bathing from  edge of bed with SBA.  Supine to sit with Modified [place].  Stand pivot transfers with Mod I .  Toilet transfer to bedside commode with Mod I.  Pt. To be I with HEP for BUE to improve endurance level                      Plan:  Patient to be seen 5 x/week to address the above listed problems via self-care/home management, therapeutic activities, therapeutic exercises  Plan of Care expires: 05/23/19  Plan of Care reviewed with: patient    SABRA Mcguire  04/30/2019

## 2019-04-30 NOTE — PLAN OF CARE
Problem: Occupational Therapy Goal  Goal: Occupational Therapy Goal  Goals to be met by: 05-06-19     Patient will increase functional independence with ADLs by performing:    UE Dressing with Mod I  LE Dressing with Mod I.  Grooming while seated with Mod I .  Toileting from bedside commode with Mod I for hygiene and clothing management.   Bathing from  edge of bed with SBA.  Supine to sit with Modified [place].  Stand pivot transfers with Mod I .  Toilet transfer to bedside commode with Mod I.  Pt. To be I with HEP for BUE to improve endurance level     Pt. Tolerated session well.

## 2019-05-01 PROBLEM — Z91.89: Status: ACTIVE | Noted: 2019-01-01

## 2019-05-01 NOTE — PROGRESS NOTES
[facility]                                  Skilled Nursing Facility                   Progress Note     Admit Date: 4/22/2019  JESSY 5/13/2019  Principal Problem:  Problem with dialysis access   HPI obtained from patient interview and chart review     Chief Complaint: Nutritionist reporting the need for dietary adjustment.     HPI: 49 yo with history of coronary artery disease, chronic systolic and diastolic heart failure (EF 25%), and ESRF on peritoneal dialysis since 2004 presented to [facility] on 2/21 with abdominal pain. She was initiated on treatment for peritonitis and had removal of peritoneal catheter. She developed active GI bleed and associated encephalopathy. Patient had emergent EGD 3/13 that showed erosive esophagitis with small ulcer and clotted blood in the gastric fundus. The following day she had repeat EGD that found one non-bleeding cratered gastric ulcer with clean ulcer base. She was then transfer to [facility] ICU for vascular surgery assistance in dialysis access placement. She had RUE graft 3/22 placed, but it clotted. She has poor vascular options and vascular surgery recommended that she continue with peritoneal access if possible. She is currently being dialyzed with permacath in left groin placed 3/28. She developed NSTEMI day after groin catheter placement. Cardiology recommended goal directed medical therapy. Patient was transferred here to [facility] on 4/4 for debility. She returned to [facility] ER on 4/6 after her dialysis access did not work. TPA was instilled in her permacath and she was able to undergo a full dialysis session. Patient complains of abdominal distension that has been attributed to extra fluid. She has had some dyspnea on exertion that is somewhat worse in the last few weeks, but denies cough or wheezing. She denies chest pain, palpitations, nausea, or vomiting.      Interval Hx: During f/u  of care, patient continued to be treated at [facility] with PT and OT to improve functional status and ability to perform ADLs. Nutritionist reporting the need for dietary adjustment to optimize patient nutritional needs. Discontinued renal restrictions and increased fluid restriction.     Past Medical History: Patient has a past medical history of Abnormal finding on Pap smear, HPV DNA positive (), Anemia associated with chronic renal failure, Blood type B+, Bulging discs - symptomatic , CAD (coronary artery disease), Cardiomyopathy (3/13/2019), Diabetes mellitus, type 2, ESRD (end stage renal disease) (2004), FSGS (focal segmental glomerulosclerosis), Hyperlipidemia, Hypertension (), Neuropathy, NSTEMI (non-ST elevated myocardial infarction) (3/29/2019), Obesity, Secondary hyperparathyroidism, renal, TIA (transient ischemic attack), and Uterine fibroid.    Past Surgical History: Patient has a past surgical history that includes Peritoneal catheter insertion; Umbilical hernia repair;  section, classic; Dialysis fistula creation; Cardiac catheterization; Incision and drainage of wound (Left, ); Open reduction and internal fixation (ORIF) of injury of hip (Left, 2018); Colonoscopy (N/A, 2018); Esophagogastroduodenoscopy (N/A, 3/13/2019); Esophagogastroduodenoscopy (N/A, 3/14/2019); Peritoneal catheter removal (N/A, 3/14/2019); Insertion of tunneled central venous hemodialysis catheter (N/A, 3/18/2019); Phlebography (Right, 3/20/2019); Placement of arteriovenous graft (Right, 3/22/2019); Insertion of tunneled central venous hemodialysis catheter (N/A, 3/28/2019); Left heart catheterization (Left, 4/15/2019); Insertion of intra-aortic balloon assist device (4/15/2019); and Coronary angiography (N/A, 4/15/2019).    Social History: Patient reports that she has never smoked. She has never used smokeless tobacco. She reports that she does not drink alcohol or use drugs.    Family  History: family history includes Cancer in her maternal grandmother and paternal grandfather; Diabetes in her maternal aunt and paternal aunt.    Allergies: Patient is allergic to clindamycin; flagyl [metronidazole hcl]; and metronidazole.    ROS  Constitutional: Negative for fever and malaise/fatigue.   Eyes: Negative for blurred vision, double vision and discharge.   Respiratory: Negative for cough, shortness of breath and wheezing.    Cardiovascular: Negative for chest pain, palpitations, claudication, leg swelling and PND.   Gastrointestinal: Negative for abdominal pain, constipation, diarrhea, nausea and vomiting.   Genitourinary: Negative for dysuria, frequency and urgency.   Musculoskeletal:  + generalized weakness. Negative for back pain and myalgias.   Skin: Negative for itching and rash.   Neurological: Negative for dizziness, speech change, seizures, and headaches.   Psychiatric/Behavioral: Negative for depression. The patient is not nervous/anxious.      PEx  Temp:  [98.7 °F (37.1 °C)-99.3 °F (37.4 °C)]   Pulse:  []   Resp:  [17-18]   BP: (90-98)/(58-69)   SpO2:  [98 %-100 %]   Body mass index is 32.94 kg/m².      Constitutional: Patient appears well-developed and well-nourished.   HENT:   Head: Normocephalic and atraumatic.   Eyes: Pupils are equal, round, and reactive to light.   Neck: Normal range of motion. Neck supple.   Cardiovascular: Normal rate, regular rhythm and normal heart sounds.    Pulmonary/Chest: Effort normal and breath sounds are clear  Abdominal: Soft. Bowel sounds are normal. + Distention greatly decrease.  Musculoskeletal: Normal range of motion. + generalized weakness  Neurological: Alert and oriented to person, place, and time.   Skin: Skin is warm and dry. + Lt femoral HD cath  Psychiatric: Normal mood and affect. Behavior is normal.     Recent Labs   Lab 04/25/19  0604 04/29/19  0525   WBC 12.15 12.79*   HGB 7.5* 7.5*   HCT 25.3* 26.2*   * 501*     Recent Labs   Lab  04/25/19  0604 04/29/19  0525    143   K 4.1 4.0   CL 99 101   CO2 27 27   BUN 19 20   CREATININE 5.6* 5.2*   * 157*   CALCIUM 8.4* 8.1*   MG 1.5* 1.3*   PHOS 4.2 3.7     No results for input(s): ALKPHOS, ALT, AST, ALBUMIN, PROT, BILITOT, INR in the last 168 hours.   Recent Labs   Lab 04/29/19  1610 04/29/19  2036 04/30/19  0656 04/30/19  1605 04/30/19  2030 05/01/19  0709   POCTGLUCOSE 144* 236* 193* 222* 236* 162*       Assessment and Plan:  At high risk for imbalanced nutrition  -5/1 Continued 2000 diabetic diet. Increased fluid restriction to 1500 ml/day. Discontinued renal restriction due to food preference and the need for increase nutrional intake. Goal intake is 85% Of EEN     Hypotension  -4/27 discontinued Coreg and Irbesartan for systolic blood pressures in the 80s  -4/29 Increased midodrine to 10 mg bid  -4/30 Hypotension is improved today, currently at 108/71, s/p increasing midodrine dosing yesterday.     ESRD (end stage renal disease) on dialysis  peritonitis due to peritoneal dialysis catheter  -Previously PD patient; admitted 2/21 at [facility] for peritonitis, had PD catheter removed for recurrent peritonitis & transitioned to HD. Abx complete for peritonitis  Bilateral IJ tunneled catheters were attempted without success due to thrombus. Now has a temporary dialysis catheter in her left fem that was placed 3/28.  Prior to permacath placement, she had a next day graft placed by Dr. Leger on 3/22, but it was never used and was clotted 1 day post-op. Patient refused to have the graft declotted, so the permacatch was placed at that time.  -4/25 HD TTS, HD today  -4/29 Reviewed Bun/cr 20/5.2, Scheduled Dialysis on TTS.   -4/30 abd distention is greatly improved with HD treatments.     Debility  - Continue with PT/OT for gait training and strengthening and restoration of ADL's   - Encourage mobility, OOB in chair, and early ambulation as appropriate  - Fall precautions   - Monitor for bowel  and bladder dysfunction  - Monitor for and prevent skin breakdown and pressure ulcers  - Continue DVT prophylaxis with heparin   -4/30 Patient is reporting that she does not want to take heparin due to her haveing an acute anemic episode requiring transition to ED in the past. Patient educated on the importance of dvt prophylaxis with decrease mobility, she has decided to continue to refuse heparin.     Hypomagnesemia  -4/29 initiated mag oxide tab 400 mg daily    NSTEMI (non-ST elevated myocardial infarction)  Acute systolic heart failure  -s/p ACS protocol (DAPT, statin, heparin drip stopped)/NSTEMI   -CT surgery/CABG eval revealed, not a candidate due to comorbidities.   -HTS consult as outpatient to discuss advanced options  -LV EF of 25%  -4/25 Continue HD as outpatient for volume removal  -Continue Asa, statin, and plavix daily  -Discontinued Coreg   -Discontinue irbesartan  -F/u with cards, Ndandu      Anemia in chronic kidney disease, on chronic dialysis  -Continue Epo per neph and protonix     Pressure injury of sacral region, stage 2  -Wound care consulted    DM II with neuropathy   - Continue Gabapentin 300 mg bid    Future Appointments   Date Time Provider Department Center   5/2/2019  6:00 PM Sung Alonso MD [facility] GENLESLEY Morel   5/6/2019 11:40 AM Calos Yousif MD [facility] CARDIO Macie Monterroso NP

## 2019-05-01 NOTE — PT/OT/SLP PROGRESS
Occupational Therapy  Treatment    Jenni Todd   MRN: [id number]   Admitting Diagnosis: Problem with dialysis access    OT Date of Treatment: 05/01/19       Billable Minutes:  Self Care/Home Management 15  Therapeutic Activity 10 and Therapeutic exercise 15    General Precautions: Standard, fall  Orthopedic Precautions: N/A  Braces: N/A    Spiritual, Cultural Beliefs, Adventism Practices, Values that Affect Care: no    Subjective:  Communicated with nurse prior to session.      Pain/Comfort  Pain Rating 1: 0/10  Pain Rating Post-Intervention 1: 0/10    Objective:  Patient found with: (no lines)    Occupational Performance:    Bed Mobility:    · Patient completed Rolling/Turning to Right with supervision  · Patient completed Supine to Sit with stand by assistance  · Patient completed Sit to Supine with stand by assistance     Functional Mobility/Transfers:  · Patient completed Sit <> Stand Transfer with minimum assistance  with  rolling walker   · Patient completed Bed <> Chair Transfer using Stand Pivot technique with minimum assistance with rolling walker  · Functional Mobility: Pt propelled W/C from her room toward therapy gym using (B) UEs a distance of 100ft but was unable to propel entire way to gym today.    Activities of Daily Living:  · Upper Body Dressing: stand by assistance donning robe sitting in W/C  · Lower Body Dressing: contact guard assistance when standing to manage pants oveer hips. Pt donning/doffing pants , socks and slip on shoes.    AMPAC 6 Click:  AMPAC Total Score: 20    OT Exercises: UE Ergometer performed 13 minutes on UBE with Min resistance. UE exercises performed to increase functional endurance and strength.  Strengthening required in order increase independence when performing self care tasks, functional ambulation, W/C propulsion , functional standing activities as well as when performing functional tasks.    Patient left up in chair with call button in reach and nurse  notified    ASSESSMENT:  Jenni Todd is a 48 y.o. female with a medical diagnosis of Problem with dialysis access . Pt was agreeable to OT and tolerated Tx without incidence.   Pt was agreeable to OT and tolerated Tx without incidence.  She is making progress but continues to present with deficits affecting (I)ce with functional transfers, functional standing balance and self care tasks with standing component.   OT continues to be recommended to further her functional (I)ce and safety. Goals remain appropriate and continued OT is recommended.        Rehab identified problem list/impairments: impaired endurance, impaired self care skills, impaired functional mobilty, impaired cardiopulmonary response to activity    Rehab potential is good    Activity tolerance: Good    Discharge recommendations: home health OT(may require S initially)     Barriers to discharge: Decreased caregiver support     Equipment recommendations: commode     GOALS:   Multidisciplinary Problems     Occupational Therapy Goals        Problem: Occupational Therapy Goal    Goal Priority Disciplines Outcome Interventions   Occupational Therapy Goal     OT, PT/OT Ongoing (interventions implemented as appropriate)    Description:  Goals to be met by: 05-06-19     Patient will increase functional independence with ADLs by performing:    UE Dressing with Mod I  LE Dressing with Mod I.  Grooming while seated with Mod I .  Toileting from bedside commode with Mod I for hygiene and clothing management.   Bathing from  edge of bed with SBA.  Supine to sit with Modified [place].  Stand pivot transfers with Mod I .  Toilet transfer to bedside commode with Mod I.  Pt. To be I with HEP for BUE to improve endurance level                      Plan:  Patient to be seen 5 x/week to address the above listed problems via self-care/home management, therapeutic activities, therapeutic exercises  Plan of Care expires: 05/23/19  Plan of Care reviewed with:  patient    Rashaun Johnson, OTR/L  05/01/2019

## 2019-05-01 NOTE — PLAN OF CARE
Problem: Occupational Therapy Goal  Goal: Occupational Therapy Goal  Goals to be met by: 05-06-19     Patient will increase functional independence with ADLs by performing:    UE Dressing with Mod I  LE Dressing with Mod I.  Grooming while seated with Mod I .  Toileting from bedside commode with Mod I for hygiene and clothing management.   Bathing from  edge of bed with SBA.  Supine to sit with Modified [place].  Stand pivot transfers with Mod I .  Toilet transfer to bedside commode with Mod I.  Pt. To be I with HEP for BUE to improve endurance level     Outcome: Ongoing (interventions implemented as appropriate)  Rashaun Johnson, OTR/L      5/1/2019

## 2019-05-01 NOTE — PT/OT/SLP PROGRESS
"Physical Therapy  Treatment    Jenni Todd   MRN: [id number]   Admitting Diagnosis: Problem with dialysis access    PT Received On: 05/01/19          Billable Minutes:  Gait Training 13, Therapeutic Activity 10 and Therapeutic Exercise 15    Treatment Type: Treatment  PT/PTA: PTA     PTA Visit Number: 1       General Precautions: Standard, fall  Orthopedic Precautions: N/A   Braces: N/A    Spiritual, Cultural Beliefs, Methodist Practices, Values that Affect Care: no    Subjective:pt smiling upon arrival "feeling better today"  Communicated with Dariana pinog prior to session.cleared for thereapy BP pre gait 94/63 post gait 109/68 no repts of any dizziness, nsg notified    Pain/Comfort  Pain Rating 1: 0/10  Pain Rating 2: 0/10    Objective:   Patient found with: (in wc)     AM-PAC 6 CLICK MOBILITY  Total Score:14    Transfers:  Sit<>Stand: with rollator CGA from WC and rollator    Gait:  Amb with rollator CGA~ 40 ft x 2 trials seated rest break on rollator     Wheelchair Mobility:  Patient propels w/c ~75 ft x 2 trials BUE S     Therex:  2x10 reps AP,GS,LAQ,hip flex,abd/add with rest breaks    Patient left up in chair with call button in reach and belongings inreach.    Assessment:  Jenni Todd is a 48 y.o. female with a medical diagnosis of Problem with dialysis access.  Pt tolerated well, pt would continue to benefit from skilled PT services to improve overall functional mobility, strength and endurance.  .    Rehab identified problem list/impairments: weakness, impaired endurance, impaired sensation, impaired self care skills, impaired functional mobilty, gait instability, impaired balance, decreased coordination, decreased lower extremity function, impaired cardiopulmonary response to activity    Rehab potential is good.    Activity tolerance: Fair    Discharge recommendations: home health PT     Barriers to discharge: Decreased caregiver support    Equipment recommendations: commode     GOALS: "   Multidisciplinary Problems     Physical Therapy Goals        Problem: Physical Therapy Goal    Goal Priority Disciplines Outcome Goal Variances Interventions   Physical Therapy Goal     PT, PT/OT Ongoing (interventions implemented as appropriate)     Description:  Goals to be met by: 2019 (2 weeks)    Patient will increase functional independence with mobility by performin. Supine to sit with Modified [place]  2. Sit to supine with Modified [place]  3. Sit to stand transfer with SBA  4. Bed to chair transfer with SBA using Rolling Walker or Rollator  5. Gait  x 30 feet with SBA using Rolling Walker or Rollator  6. Wheelchair propulsion x100 feet with Supervision using bilateral upper extremities  7. Ascend/Descend 4 inch curb step with Stand-by Assistance using Rolling Walker or Rollator (on hold for now due to medical status)  8. Stand for 2 minutes with Stand-by Assistance using Rolling Walker or Rollator   9. Lower extremity exercise program x30 reps per handout, with assistance as needed                         PLAN:    Patient to be seen 5 x/week  to address the above listed problems via gait training, therapeutic activities, therapeutic exercises  Plan of Care expires: 19  Plan of Care reviewed with: patient    Sailaja El, PTA  2019

## 2019-05-01 NOTE — PLAN OF CARE
Problem: Physical Therapy Goal  Goal: Physical Therapy Goal  Goals to be met by: 2019 (2 weeks)    Patient will increase functional independence with mobility by performin. Supine to sit with Modified [place]  2. Sit to supine with Modified [place]  3. Sit to stand transfer with SBA  4. Bed to chair transfer with SBA using Rolling Walker or Rollator  5. Gait  x 30 feet with SBA using Rolling Walker or Rollator  6. Wheelchair propulsion x100 feet with Supervision using bilateral upper extremities  7. Ascend/Descend 4 inch curb step with Stand-by Assistance using Rolling Walker or Rollator (on hold for now due to medical status)  8. Stand for 2 minutes with Stand-by Assistance using Rolling Walker or Rollator   9. Lower extremity exercise program x30 reps per handout, with assistance as needed        Outcome: Ongoing (interventions implemented as appropriate)  Goals remain appropriate

## 2019-05-02 NOTE — PT/OT/SLP PROGRESS
"Physical Therapy  Treatment    Jenni Todd   MRN: [id number]   Admitting Diagnosis: Problem with dialysis access    PT Received On: 05/02/19          Billable Minutes:  Gait Training 15, Therapeutic Activity 15 and Therapeutic Exercise 10    Treatment Type: Treatment  PT/PTA: PTA     PTA Visit Number: 2       General Precautions: Standard, fall  Orthopedic Precautions: N/A   Braces: N/A    Spiritual, Cultural Beliefs, Temple Practices, Values that Affect Care: no    Subjective:  'feel good"    Pain/Comfort  Pain Rating 1: 0/10  Pain Rating Post-Intervention 1: 0/10    Objective:   Patient found with: (in wc)     AM-PAC 6 CLICK MOBILITY  Total Score:14    Transfers:  Sit<>Stand: SBA with rollator    Gait:at rest /71 after first trial 113/82, 2nd trial 114/71  dec to 100 bpm at rest nsg notified  Amb with rollator SBA occ std'g rest breaks ~ 80 ft with one turn and 28 ft seated rest break       Wheelchair Mobility:  Patient propels w/c ~75 ft with BUE S x 2 trials pre/post session    Therex:  2x10 reps AP,GS,LAQ,hip flex,abd/add with rest breaks    Patient left up in chair with with dialysis transport.    Assessment:  Jenni Todd is a 48 y.o. female with a medical diagnosis of Problem with dialysis access.  Pt tolerated well, pt would continue to benefit from skilled PT services to improve overall functional mobility, strength and endurance.  .    Rehab identified problem list/impairments: weakness, impaired endurance, impaired sensation, impaired self care skills, impaired functional mobilty, gait instability, impaired balance, decreased coordination, decreased lower extremity function, impaired cardiopulmonary response to activity    Rehab potential is good.    Activity tolerance: Fair    Discharge recommendations: home health PT     Barriers to discharge: Decreased caregiver support    Equipment recommendations: commode     GOALS:   Multidisciplinary Problems     Physical Therapy Goals     "    Problem: Physical Therapy Goal    Goal Priority Disciplines Outcome Goal Variances Interventions   Physical Therapy Goal     PT, PT/OT Ongoing (interventions implemented as appropriate)     Description:  Goals to be met by: 2019 (2 weeks)    Patient will increase functional independence with mobility by performin. Supine to sit with Modified [place]  2. Sit to supine with Modified [place]  3. Sit to stand transfer with SBA met  4. Bed to chair transfer with SBA using Rolling Walker or Rollator  5. Gait  x 30 feet with SBA using Rolling Walker or Rollator met  6. Wheelchair propulsion x100 feet with Supervision using bilateral upper extremities  7. Ascend/Descend 4 inch curb step with Stand-by Assistance using Rolling Walker or Rollator (on hold for now due to medical status)  8. Stand for 2 minutes with Stand-by Assistance using Rolling Walker or Rollator   9. Lower extremity exercise program x30 reps per handout, with assistance as needed                          PLAN:    Patient to be seen 5 x/week  to address the above listed problems via gait training, therapeutic activities, therapeutic exercises  Plan of Care expires: 19  Plan of Care reviewed with: patient    Sailaja El, PTA  2019

## 2019-05-02 NOTE — PROGRESS NOTES
I have seen the patient, reviewed the Resident's history and physical, assessment and plan. I have personally interviewed and examined the patient at bedside and: agree with the findings.     She has esrd on hemo now and s/p 4 PD catheters (all removed possibly due to infection) and one fistula and 2 grafts that failed.  She now has 2 open wounds from the removal of her last infected PD and has a permacath for dialysis.  She is also s/p umbo hernia times 2.  Will obtain last op note to see what was thought of intraabdominal adhesions and wait for skin to close prior to considering redo pd.  Rtc 2 weeks.  She will need cardiology clearance.

## 2019-05-02 NOTE — PT/OT/SLP PROGRESS
Occupational Therapy  Treatment    Jenni Todd   MRN: [id number]   Admitting Diagnosis: Problem with dialysis access    OT Date of Treatment: 05/02/19       Billable Minutes:  Self Care/Home Management 38    General Precautions: Standard, fall, diabetic(cardiac)  Orthopedic Precautions: N/A  Braces: N/A    Spiritual, Cultural Beliefs, Yazidism Practices, Values that Affect Care: no    Subjective:  Communicated with nurse prior to session.  Pt. Reported she was feeling better    Pain/Comfort  Pain Rating 1: 0/10  Pain Rating Post-Intervention 1: 0/10    Objective:  Patient found with: (right sidelying in bed)    Occupational Performance:    Bed Mobility:    · Patient completed Supine to Sit with modified independence     Functional Mobility/Transfers:  · Patient completed Sit <> Stand Transfer with contact guard assistance  with  no assistive device   · Patient completed Bed <> Chair Transfer using Stand Pivot technique with contact guard assistance with no assistive device  · Functional Mobility: not tested    Activities of Daily Living:  · Grooming: modified independence seated in w/c at sink  · Bathing: minimum assistance sponge bath seated at sink  · Upper Body Dressing: modified independence to don pull over shirt after set up   · Lower Body Dressing: minimum assistance to manage pants over buttocks in stand    [facility] 6 Click:  [facility] Total Score: 21    OT Exercises: not performed    Additional Treatment:  Pt. Vitals taken on this date and noted to be 114/68 02 96% and HR 99   Cushion provided in w/c 2/2 pain reporting discomfort in buttocks region    Patient left up in chair with call button in reach nurse present administering meds    ASSESSMENT:  Jenni Todd is a 48 y.o. female with a medical diagnosis of Problem with dialysis access and presents with deficits in self-care skills, functional monility and endurance. Pt. Tolerated session well on this date and appears to be feeling much better during  therapy session .    Rehab identified problem list/impairments: impaired endurance, impaired self care skills, impaired functional mobilty, impaired cardiopulmonary response to activity    Rehab potential is good    Activity tolerance: Good    Discharge recommendations: home health OT(may require S initially)     Barriers to discharge: Decreased caregiver support     Equipment recommendations: commode     GOALS:   Multidisciplinary Problems     Occupational Therapy Goals        Problem: Occupational Therapy Goal    Goal Priority Disciplines Outcome Interventions   Occupational Therapy Goal     OT, PT/OT Ongoing (interventions implemented as appropriate)    Description:  Goals to be met by: 05-06-19     Patient will increase functional independence with ADLs by performing:    UE Dressing with Mod I  LE Dressing with Mod I.  Grooming while seated with Mod I .  Toileting from bedside commode with Mod I for hygiene and clothing management.   Bathing from  edge of bed with SBA.  Supine to sit with Modified [place].  Stand pivot transfers with Mod I .  Toilet transfer to bedside commode with Mod I.  Pt. To be I with HEP for BUE to improve endurance level                      Plan:  Patient to be seen 5 x/week to address the above listed problems via self-care/home management, therapeutic activities, therapeutic exercises  Plan of Care expires: 05/23/19  Plan of Care reviewed with: patient    SABRA Mcguire  05/02/2019

## 2019-05-02 NOTE — Clinical Note
May 2, 2019      Camrny Dan MD  [address]  [place] [address]           [facility] - General Surgery  [address]  [place] [address]  Phone: [phone]          Patient: Jenni Todd   MR Number: [id number]   YOB: 1970   Date of Visit: 5/2/2019       Dear Dr. Camryn Dan:    Thank you for referring Jenni Todd to me for evaluation. Attached you will find relevant portions of my assessment and plan of care.    If you have questions, please do not hesitate to call me. I look forward to following Jenni Todd along with you.    Sincerely,    Sung Alonso MD    Enclosure  CC:  No Recipients    If you would like to receive this communication electronically, please contact [email or web address] or [phone] to request more information on [employer] access.    For providers and/or their staff who would like to refer a patient to [facility], please contact us through our one-stop-shop provider referral line, [facility] , at [phone].    If you feel you have received this communication in error or would no longer like to receive these types of communications, please e-mail [email or web address]

## 2019-05-02 NOTE — PROGRESS NOTES
History & Physical    SUBJECTIVE:     History of Present Illness:  Patient is a 48 y.o. female presents for PD cath eval. Multiple failed attempts to establish long term vascular access (one failed fistula and two failed grafts). Had been on PD for 16 years.     Then had peritonitis. PD cath removed on 3/14. Last cx of peritoneal fluid was 3/12 and was negative. Originally gre E. Cloacae and S aureus on 3/1/2019. Last blood cx 19 negative.    She had three vessel CAD 80 - 99% stenosis of vessels. She had MI in . She did have some angina about three weeks ago. EF 25%.    She has had , umbo repair x 2 and about 4 PD caths (all removed 2/ peritonitis).    She is wheelchair.    Review of patient's allergies indicates:   Allergen Reactions    Clindamycin Diarrhea    Flagyl [metronidazole hcl]     Metronidazole        No current facility-administered medications for this visit.      No current outpatient medications on file.     Facility-Administered Medications Ordered in Other Visits   Medication Dose Route Frequency Provider Last Rate Last Dose    acetaminophen tablet 650 mg  650 mg Oral Q6H PRN Hedy Rapp MD        aspirin EC tablet 81 mg  81 mg Oral Daily Hedy Rapp MD   81 mg at 19 1612    atorvastatin tablet 40 mg  40 mg Oral QHS Hedy Rapp MD   40 mg at 19 211    calcitRIOL capsule 0.5 mcg  0.5 mcg Oral Daily Hedy Rapp MD   0.5 mcg at 19 0811    calcium carbonate 200 mg calcium (500 mg) chewable tablet 500 mg  500 mg Oral BID PRN Hedy Rapp MD        cinacalcet tablet 90 mg  90 mg Oral Daily Hedy Rapp MD   90 mg at 19 0812    clopidogrel tablet 75 mg  75 mg Oral Daily Hedy Rapp MD   75 mg at 19 1612    dextrose 50% injection 12.5 g  12.5 g Intravenous PRN Julieta Donnelly NP        dextrose 50% injection 25 g  25 g Intravenous PRN Julieta Donnelly NP        gabapentin capsule 300 mg  300 mg Oral BID Camryn Dan MD   300 mg at  05/02/19 0811    glucagon (human recombinant) injection 1 mg  1 mg Intramuscular PRN Julieta Donnelly, NP        glucose chewable tablet 16 g  16 g Oral PRN Julieta Donnelly NP        glucose chewable tablet 24 g  24 g Oral PRN Julieta Donnelly NP        heparin (porcine) injection 5,000 Units  5,000 Units Subcutaneous Q12H Julieta Donnelly NP        insulin aspart U-100 pen 0-5 Units  0-5 Units Subcutaneous QID (AC + HS) PRN Julieta Donnelly NP   2 Units at 05/02/19 1613    loperamide capsule 2 mg  2 mg Oral Q3H PRN Cleveland P. Loc, NP   2 mg at 05/02/19 0553    magnesium oxide tablet 400 mg  400 mg Oral BID Cleveland P. Andgabrielle, DIONE        midodrine tablet 10 mg  10 mg Oral BID AC Cleveland P. Andras, NP   10 mg at 05/02/19 1612    pantoprazole EC tablet 40 mg  40 mg Oral Daily Hedy Rapp MD   40 mg at 05/02/19 0811    phenyleph-shark radha oil-mo-pet ointment 1 applicator  1 applicator Rectal BID Cleveland P. Loc, DIONE        promethazine (PHENERGAN) 25 mg in dextrose 5 % 50 mL IVPB  25 mg Intravenous Q6H PRN Zulema Lamas Jr., KODAK        ramelteon tablet 8 mg  8 mg Oral Nightly PRN Hedy Rapp MD        senna-docusate 8.6-50 mg per tablet 1 tablet  1 tablet Oral BID PRN Hedy Rapp MD        sevelamer carbonate tablet 800 mg  800 mg Oral TID  Hedy Rapp MD   800 mg at 05/02/19 1612    vitamin renal formula (B-complex-vitamin c-folic acid) 1 mg per capsule 1 capsule  1 capsule Oral Daily Hedy Rapp MD   1 capsule at 05/02/19 0811       Past Medical History:   Diagnosis Date    Abnormal finding on Pap smear, HPV DNA positive 2014    Anemia associated with chronic renal failure     on Epogen    Blood type B+     Bulging discs - symptomatic      CAD (coronary artery disease)     Cardiomyopathy 3/13/2019    Diabetes mellitus, type 2     ESRD (end stage renal disease) 04/20/2004    FSGS (focal segmental glomerulosclerosis)     with collapsing    Hyperlipidemia     Hypertension 2004     Neuropathy     NSTEMI (non-ST elevated myocardial infarction) 3/29/2019    Obesity     Secondary hyperparathyroidism, renal     TIA (transient ischemic attack)     Uterine fibroid     small uterine      Past Surgical History:   Procedure Laterality Date    ANGIOGRAM, CORONARY ARTERY N/A 4/15/2019    Performed by Roland [facility]ier [facility] at [facility] CATH LAB    CARDIAC CATHETERIZATION      PCI x 2     SECTION, CLASSIC      COLONOSCOPY N/A 2018    Performed by Rodrigue Russell MD at [facility] ENDO (2ND FLR)    DEBRIDEMENT-WOUND Left 2016    Performed by Teressa Maddox MD at [facility] [place] OR    DIALYSIS FISTULA CREATION      multiple fistulas and grafts before PD     EGD (ESOPHAGOGASTRODUODENOSCOPY) N/A 3/14/2019    Performed by Adolph Davila MD at [facility] ENDO    EGD (ESOPHAGOGASTRODUODENOSCOPY) N/A 3/13/2019    Performed by Adolph Davila MD at [facility] ENDO    INCISION AND DRAINAGE (I&D), LABIAL N/A 2016    Performed by Harmony Fernandez MD at [facility] [place] OR    INCISION AND DRAINAGE (I&D), LABIAL (ADD ON) Left 2016    Performed by Teressa Maddox MD at [facility] [place] OR    INCISION AND DRAINAGE OF WOUND Left     VULVAR ABCESS WITH NECROSIS    Insertion, Catheter, Central Venous, Hemodialysis N/A 3/28/2019    Performed by Kimo Weeks MD at [facility] CATH LAB    Insertion, Catheter, Central Venous, Hemodialysis N/A 3/18/2019    Performed by Kimo Weeks MD at [facility] CATH LAB    INSERTION, CATHETER, TUNNELED ABORTED Left 3/14/2019    Performed by Servando Solis MD at [facility] OR    INSERTION, GRAFT, ARTERIOVENOUS, RIGHT ARM Right 3/22/2019    Performed by BESSIE Wynn III, MD at [facility] OR 2ND FLR    INSERTION, INTRA-AORTIC BALLOON PUMP  4/15/2019    Performed by Roland Napier MD at [facility] CATH LAB    Left heart cath Left 4/15/2019    Performed by Roland Napier MD at [facility] CATH LAB    ORIF, HIP Left 2018    Performed by Tevin Grullon MD at [facility] [place] OR    PERITONEAL  "CATHETER INSERTION      PERMCATH REWIRE- TUNNELED CATH REWIRE Left 11/13/2017    Performed by Baldev Munroe MD at [facility] [place], [facility] CATH LAB    PERMCATH REWIRE- TUNNELED CATH REWIRE N/A 10/5/2017    Performed by Baldev Munroe MD at [facility] [place] CATH LAB    REMOVAL, CATHETER, DIALYSIS, PERITONEAL N/A 3/14/2019    Performed by [facility] at [facility] OR    UMBILICAL HERNIA REPAIR      Venogram, possible intervention Right 3/20/2019    Performed by BESSIE Wynn III, MD at [facility] CATH LAB     Family History   Problem Relation Age of Onset    Cancer Maternal Grandmother     Cancer Paternal Grandfather     Diabetes Maternal Aunt     Diabetes Paternal Aunt     Kidney disease Neg Hx     Heart disease Neg Hx     Ovarian cancer Neg Hx     Breast cancer Neg Hx      Social History     Tobacco Use    Smoking status: Never Smoker    Smokeless tobacco: Never Used   Substance Use Topics    Alcohol use: No     Comment: Pt reports some social use of about 1-2 drinks about every six months.    Drug use: No        Review of Systems:  Review of Systems   Constitutional: Positive for fatigue. Negative for chills, fever and unexpected weight change.   HENT: Negative for congestion, sinus pressure and sinus pain.    Eyes: Negative.    Respiratory: Negative for shortness of breath.    Cardiovascular: Positive for leg swelling. Negative for chest pain and palpitations.   Gastrointestinal: Positive for abdominal distention. Negative for abdominal pain, blood in stool, diarrhea, nausea and vomiting.   Endocrine: Negative.    Genitourinary: Negative for dysuria.   Musculoskeletal: Negative for arthralgias and myalgias.   Skin: Negative.    Allergic/Immunologic: Negative.    Neurological: Negative.  Negative for dizziness and headaches.   Hematological: Negative.    Psychiatric/Behavioral: Negative.        OBJECTIVE:     Vital Signs (Most Recent)  Pulse: 93 (05/02/19 1716)  BP: 103/64 (05/02/19 1716)  5' 7" (1.702 m)  97.1 kg (214 lb) "     Physical Exam:  Physical Exam   Constitutional: She is oriented to person, place, and time. She appears well-developed and well-nourished. No distress.   obese   Cardiovascular: Normal rate and regular rhythm.   Murmur heard.  Pulmonary/Chest: Effort normal. No respiratory distress.   Abdominal: Soft. She exhibits no distension and no mass. There is no tenderness. There is no rebound and no guarding. No hernia.   She has three unhealed incisions with packing from 3/14/19 removal of PD cath   Musculoskeletal: She exhibits edema.   Neurological: She is alert and oriented to person, place, and time.   Skin: Skin is warm and dry. She is not diaphoretic.   Psychiatric: She has a normal mood and affect. Her behavior is normal.   Vitals reviewed.      Laboratory  CBC: Reviewed  BMP: Reviewed  a  Culture data reviewed    Diagnostic Results:  Cardiac studies reviewed      ASSESSMENT/PLAN:     47 yo woman with ESRD, CAD who needs PD access but has history of 4 failed PD cath. Vascular access is off the table. Has been on kidney txp list since 2004. We are greater than six weeks out from removal of last PD cath, but she still has non-healed wounds from that surgery.    PLAN:Plan     - needs cardiac clearance  - needs to have healed abdominal wounds prior to repeat PD cath placement  - RTC in two weeks for re-evaluation of wounds    ZENY Peters MD   General Surgery, PGY-1  Pager: [phone]

## 2019-05-02 NOTE — PROGRESS NOTES
[facility]                                  Skilled Nursing Facility                   Progress Note     Admit Date: 4/22/2019  JESSY 5/13/2019  Principal Problem:  Problem with dialysis access   HPI obtained from patient interview and chart review     Chief Complaint: Patient reporting hemorrhoids are flaring up and Lab review    HPI: 49 yo with history of coronary artery disease, chronic systolic and diastolic heart failure (EF 25%), and ESRF on peritoneal dialysis since 2004 presented to [facility] on 2/21 with abdominal pain. She was initiated on treatment for peritonitis and had removal of peritoneal catheter. She developed active GI bleed and associated encephalopathy. Patient had emergent EGD 3/13 that showed erosive esophagitis with small ulcer and clotted blood in the gastric fundus. The following day she had repeat EGD that found one non-bleeding cratered gastric ulcer with clean ulcer base. She was then transfer to [facility] ICU for vascular surgery assistance in dialysis access placement. She had RUE graft 3/22 placed, but it clotted. She has poor vascular options and vascular surgery recommended that she continue with peritoneal access if possible. She is currently being dialyzed with permacath in left groin placed 3/28. She developed NSTEMI day after groin catheter placement. Cardiology recommended goal directed medical therapy. Patient was transferred here to [facility] on 4/4 for debility. She returned to [facility] ER on 4/6 after her dialysis access did not work. TPA was instilled in her permacath and she was able to undergo a full dialysis session. Patient complains of abdominal distension that has been attributed to extra fluid. She has had some dyspnea on exertion that is somewhat worse in the last few weeks, but denies cough or wheezing. She denies chest pain, palpitations, nausea, or vomiting.      Interval Hx: During  f/u of care, patient continued to be treated at [facility] with PT and OT to improve functional status and ability to perform ADLs. Patient is reporting that her hemorrhoids are flaring up and would like some Preparation H. Labs reviewed, Wbc slightly elevated at 12.89, continue to monitor. H&H at 7.6/25.3, continue Epo. Mag 1.4, increased po mag to bid. Bun/Cr at 25/5.8, continue dialysis TTS.    Past Medical History: Patient has a past medical history of Abnormal finding on Pap smear, HPV DNA positive (), Anemia associated with chronic renal failure, Blood type B+, Bulging discs - symptomatic , CAD (coronary artery disease), Cardiomyopathy (3/13/2019), Diabetes mellitus, type 2, ESRD (end stage renal disease) (2004), FSGS (focal segmental glomerulosclerosis), Hyperlipidemia, Hypertension (), Neuropathy, NSTEMI (non-ST elevated myocardial infarction) (3/29/2019), Obesity, Secondary hyperparathyroidism, renal, TIA (transient ischemic attack), and Uterine fibroid.    Past Surgical History: Patient has a past surgical history that includes Peritoneal catheter insertion; Umbilical hernia repair;  section, classic; Dialysis fistula creation; Cardiac catheterization; Incision and drainage of wound (Left, ); Open reduction and internal fixation (ORIF) of injury of hip (Left, 2018); Colonoscopy (N/A, 2018); Esophagogastroduodenoscopy (N/A, 3/13/2019); Esophagogastroduodenoscopy (N/A, 3/14/2019); Peritoneal catheter removal (N/A, 3/14/2019); Insertion of tunneled central venous hemodialysis catheter (N/A, 3/18/2019); Phlebography (Right, 3/20/2019); Placement of arteriovenous graft (Right, 3/22/2019); Insertion of tunneled central venous hemodialysis catheter (N/A, 3/28/2019); Left heart catheterization (Left, 4/15/2019); Insertion of intra-aortic balloon assist device (4/15/2019); and Coronary angiography (N/A, 4/15/2019).    Social History: Patient reports that she has never smoked.  She has never used smokeless tobacco. She reports that she does not drink alcohol or use drugs.    Family History: family history includes Cancer in her maternal grandmother and paternal grandfather; Diabetes in her maternal aunt and paternal aunt.    Allergies: Patient is allergic to clindamycin; flagyl [metronidazole hcl]; and metronidazole.    ROS  Constitutional: Negative for fever and malaise/fatigue.   Eyes: Negative for blurred vision, double vision and discharge.   Respiratory: Negative for cough, shortness of breath and wheezing.    Cardiovascular: Negative for chest pain, palpitations, claudication, leg swelling and PND.   Gastrointestinal: Negative for abdominal pain, constipation, diarrhea, nausea and vomiting. + Reporting itchy, burning hemorrhoids    Genitourinary: Negative for dysuria, frequency and urgency.   Musculoskeletal:  + generalized weakness. Negative for back pain and myalgias.   Skin: Negative for itching and rash.   Neurological: Negative for dizziness, speech change, seizures, and headaches.   Psychiatric/Behavioral: Negative for depression. The patient is not nervous/anxious.      PEx  Temp:  [98.2 °F (36.8 °C)-98.8 °F (37.1 °C)]   Pulse:  [93-99]   Resp:  [16-20]   BP: ()/(63-69)   SpO2:  [96 %-97 %]   Body mass index is 33.67 kg/m².      Constitutional: Patient appears well-developed and well-nourished.   HENT:   Head: Normocephalic and atraumatic.   Eyes: Pupils are equal, round, and reactive to light.   Neck: Normal range of motion. Neck supple.   Cardiovascular: Normal rate, regular rhythm and normal heart sounds.    Pulmonary/Chest: Effort normal and breath sounds are clear  Abdominal: Soft. Bowel sounds are normal. + Distention greatly decrease.  Musculoskeletal: Normal range of motion. + generalized weakness  Neurological: Alert and oriented to person, place, and time.   Skin: Skin is warm and dry. + Lt femoral HD cath  Psychiatric: Normal mood and affect. Behavior is  normal.     Recent Labs   Lab 04/29/19  0525 05/02/19  0655   WBC 12.79* 12.89*   HGB 7.5* 7.6*   HCT 26.2* 25.3*   * 413*     Recent Labs   Lab 04/29/19  0525 05/02/19  0655    139   K 4.0 4.5    98   CO2 27 27   BUN 20 25*   CREATININE 5.2* 5.8*   * 134*   CALCIUM 8.1* 8.3*   MG 1.3* 1.4*   PHOS 3.7 3.2     No results for input(s): ALKPHOS, ALT, AST, ALBUMIN, PROT, BILITOT, INR in the last 168 hours.   Recent Labs   Lab 04/30/19  2030 05/01/19  0709 05/01/19  1141 05/01/19  1610 05/01/19  2036 05/02/19  0703   POCTGLUCOSE 236* 162* 210* 131* 178* 171*       Assessment and Plan:  ESRD (end stage renal disease) on dialysis  peritonitis due to peritoneal dialysis catheter  -Previously PD patient; admitted 2/21 at [facility] for peritonitis, had PD catheter removed for recurrent peritonitis & transitioned to HD. Abx complete for peritonitis  Bilateral IJ tunneled catheters were attempted without success due to thrombus. Now has a temporary dialysis catheter in her left fem that was placed 3/28.  Prior to permacath placement, she had a next day graft placed by Dr. Leger on 3/22, but it was never used and was clotted 1 day post-op. Patient refused to have the graft declotted, so the permacatch was placed at that time.  -4/25 HD TTS, HD today  -4/29 Reviewed Bun/cr 20/5.2, Scheduled Dialysis on TTS.   -4/30 abd distention is greatly improved with HD treatments.  -5/2  WBC at 12.89, continue to monitor. Bun/Cr at 25/5.8, continue dialysis TTS.    Anemia in chronic kidney disease, on chronic dialysis  -Continue Epo per neph and protonix  -5/2 H&H at 7.6/25.3, continue Epo    Hypomagnesemia  -4/29 initiated mag oxide tab 400 mg daily  -5/2 increased mag oxide to 400 mg bid    Hemorrhoids  -5/2 initiated phenyleph-shark radha oil-mo-pet ointment 1 applicator to be applied to rectum b.i.d.    At high risk for imbalanced nutrition  -5/1 Continued 2000 diabetic diet. Increased fluid restriction to 1500  ml/day. Discontinued renal restriction due to food preference and the need for increase nutrional intake. Goal intake is 85% Of EEN     Hypotension  -4/27 discontinued Coreg and Irbesartan for systolic blood pressures in the 80s  -4/29 Increased midodrine to 10 mg bid  -4/30 Hypotension is improved today, currently at 108/71, s/p increasing midodrine dosing yesterday.     Debility  - Continue with PT/OT for gait training and strengthening and restoration of ADL's   - Encourage mobility, OOB in chair, and early ambulation as appropriate  - Fall precautions   - Monitor for bowel and bladder dysfunction  - Monitor for and prevent skin breakdown and pressure ulcers  - Continue DVT prophylaxis with heparin   -4/30 Patient is reporting that she does not want to take heparin due to her haveing an acute anemic episode requiring transition to ED in the past. Patient educated on the importance of dvt prophylaxis with decrease mobility, she has decided to continue to refuse heparin.     NSTEMI (non-ST elevated myocardial infarction)  Acute systolic heart failure  -s/p ACS protocol (DAPT, statin, heparin drip stopped)/NSTEMI   -CT surgery/CABG eval revealed, not a candidate due to comorbidities.   -HTS consult as outpatient to discuss advanced options  -LV EF of 25%  -4/25 Continue HD as outpatient for volume removal  -Continue Asa, statin, and plavix daily  -Discontinued Coreg   -Discontinue irbesartan  -F/u with cards, Ndandu    Pressure injury of sacral region, stage 2  -Wound care consulted    DM II with neuropathy   - Continue Gabapentin 300 mg bid    Future Appointments   Date Time Provider Department Center   5/2/2019  6:00 PM Sung Alonso MD [facility]   5/6/2019 11:40 AM Calos Yousif MD [facility] CARDIO Macie Monterroso NP

## 2019-05-02 NOTE — PLAN OF CARE
Problem: Occupational Therapy Goal  Goal: Occupational Therapy Goal  Goals to be met by: 05-06-19     Patient will increase functional independence with ADLs by performing:    UE Dressing with Mod I  LE Dressing with Mod I.  Grooming while seated with Mod I .  Toileting from bedside commode with Mod I for hygiene and clothing management.   Bathing from  edge of bed with SBA.  Supine to sit with Modified [place].  Stand pivot transfers with Mod I .  Toilet transfer to bedside commode with Mod I.  Pt. To be I with HEP for BUE to improve endurance level     BP noted to be improved on this date and pt. Tolerated session better.

## 2019-05-02 NOTE — PROCEDURES
"Jenni Todd is a 48 y.o. female patient.      This is a procedure note back dated to 4/18/2019    Temp: 97.1 °F (36.2 °C) (04/22/19 1530)  Pulse: 89 (04/22/19 1530)  Resp: 16 (04/22/19 1530)  BP: (!) 154/81 (04/22/19 1530)  SpO2: 96 % (04/22/19 1530)  Weight: 97.1 kg (214 lb 1.1 oz) (04/18/19 0701)  Height: 5' 7" (170.2 cm) (04/18/19 0701)  Mallampati Scale: Class III  ASA Classification: Class 3    Procedures  Intra-aortic balloon pump removal from right CFA and closed with perclose closure device.  No complications , distal pulses intact bilaterally and unchanged post removal.  Performed by Dr. Victoriano Patrick Interventional Cardiology Fellow   Assisted by Dr. Clare Dejseus Cardiology Fellow       Clare Dejesus  5/1/2019  "

## 2019-05-02 NOTE — PLAN OF CARE
Problem: Physical Therapy Goal  Goal: Physical Therapy Goal  Goals to be met by: 2019 (2 weeks)    Patient will increase functional independence with mobility by performin. Supine to sit with Modified [place]  2. Sit to supine with Modified [place]  3. Sit to stand transfer with SBA met  4. Bed to chair transfer with SBA using Rolling Walker or Rollator  5. Gait  x 30 feet with SBA using Rolling Walker or Rollator met  6. Wheelchair propulsion x100 feet with Supervision using bilateral upper extremities  7. Ascend/Descend 4 inch curb step with Stand-by Assistance using Rolling Walker or Rollator (on hold for now due to medical status)  8. Stand for 2 minutes with Stand-by Assistance using Rolling Walker or Rollator   9. Lower extremity exercise program x30 reps per handout, with assistance as needed        Outcome: Ongoing (interventions implemented as appropriate)  Goals remain appropriate

## 2019-05-02 NOTE — LETTER
[facility] General [facility]  [address]  [place] [address]  Phone: [phone] May 2, 2019      Camryn Dan MD  [address]  [place] [address]    Patient: Jenni Todd   MR Number: [id number]   YOB: 1970   Date of Visit: 5/2/2019     Dear Dr. Dan:    Thank you for referring Jenni Todd to me for evaluation. Attached you will find relevant portions of my assessment and plan of care.    Ms. Todd presents with ESRD, on hemo now, status post four PD catheters (all removed possibly due to infection), one fistula and two grafts that failed.  She now has two open wounds from the removal of her last infected PD and has a permacath for dialysis.  She is also status post two umbo hernias.      We will obtain last op note to see what was thought of intraabdominal adhesions and wait for skin to close prior to considering redo PD.  She will need cardiology clearance.    If you have questions, please do not hesitate to call me. I look forward to following Jenni Todd along with you.    Sincerely,      Sung Alonso MD  Professor, [employer]  Section Head, General Surgery  [facility]    WSR/afw    CC  Michael Tan III, MD

## 2019-05-03 NOTE — PT/OT/SLP PROGRESS
"Physical Therapy  Treatment    Jenni Todd   MRN: [id number]   Admitting Diagnosis: Problem with dialysis access    PT Received On: 05/03/19        Billable Minutes:  Gait Training 15, Therapeutic Activity 13 and Therapeutic Exercise 13    Treatment Type: Treatment  PT/PTA: PTA     PTA Visit Number: 3       General Precautions: Standard, fall  Orthopedic Precautions: N/A   Braces: N/A    Spiritual, Cultural Beliefs, Holiness Practices, Values that Affect Care: no    Subjective:  "just need my shoes"    Pain/Comfort  Pain Rating 1: 0/10  Pain Rating Post-Intervention 1: 0/10    Objective:   Patient found with: (in EOB)     AM-PAC 6 CLICK MOBILITY  Total Score:14    Transfers:  Sit<>Stand: with rollator SBA  Stand Pivot Transfer: no AD EOB>WC SBA    Gait: BP at rest after /72 after 1st gait trials 115/71 and 2nd gait trial 116/73 HR avg 94-96bpm at rest ~ 116bpm after gait  Amb with rollator close SBA 1-2 std'g rest breaks ~53 ft and 45 ft    Wheelchair Mobility:  Patient propels w/c ~100 ft pre/post session S BUE     Therex:  2x10 reps AP,GS,LAQ,hip flex/abd/add    Patient left up in chair with call button in reach and belongings in reach.    Assessment:  Jenni Todd is a 48 y.o. female with a medical diagnosis of Problem with dialysis access.  Pt tolerated well, pt would continue to benefit from skilled PT services to improve overall functional mobility, strength and endurance.  .    Rehab identified problem list/impairments: weakness, impaired endurance, impaired sensation, impaired self care skills, impaired functional mobilty, gait instability, impaired balance, decreased coordination, decreased lower extremity function, impaired cardiopulmonary response to activity    Rehab potential is good.    Activity tolerance: Fair    Discharge recommendations: home health PT     Barriers to discharge: Decreased caregiver support    Equipment recommendations: commode     GOALS:   Multidisciplinary Problems "     Physical Therapy Goals        Problem: Physical Therapy Goal    Goal Priority Disciplines Outcome Goal Variances Interventions   Physical Therapy Goal     PT, PT/OT Ongoing (interventions implemented as appropriate)     Description:  Goals to be met by: 2019 (2 weeks)    Patient will increase functional independence with mobility by performin. Supine to sit with Modified [place]  2. Sit to supine with Modified [place]  3. Sit to stand transfer with SBA met  4. Bed to chair transfer with SBA using Rolling Walker or Rollator  5. Gait  x 30 feet with SBA using Rolling Walker or Rollator met  6. Wheelchair propulsion x100 feet with Supervision using bilateral upper extremities  7. Ascend/Descend 4 inch curb step with Stand-by Assistance using Rolling Walker or Rollator (on hold for now due to medical status)  8. Stand for 2 minutes with Stand-by Assistance using Rolling Walker or Rollator   9. Lower extremity exercise program x30 reps per handout, with assistance as needed                          PLAN:    Patient to be seen 5 x/week  to address the above listed problems via gait training, therapeutic activities, therapeutic exercises  Plan of Care expires: 19  Plan of Care reviewed with: patient    Sailaja El, PTA  2019

## 2019-05-03 NOTE — PLAN OF CARE
Problem: Physical Therapy Goal  Goal: Physical Therapy Goal  Goals to be met by: 2019 (2 weeks)    Patient will increase functional independence with mobility by performin. Supine to sit with Modified [place]  2. Sit to supine with Modified [place]  3. Sit to stand transfer with SBA met  4. Bed to chair transfer with SBA using Rolling Walker or Rollator  5. Gait  x 30 feet with SBA using Rolling Walker or Rollator met  6. Wheelchair propulsion x100 feet with Supervision using bilateral upper extremities  7. Ascend/Descend 4 inch curb step with Stand-by Assistance using Rolling Walker or Rollator (on hold for now due to medical status)  8. Stand for 2 minutes with Stand-by Assistance using Rolling Walker or Rollator   9. Lower extremity exercise program x30 reps per handout, with assistance as needed         Outcome: Ongoing (interventions implemented as appropriate)  Goals remain appropriate

## 2019-05-04 NOTE — PLAN OF CARE
Problem: Adult Inpatient Plan of Care  Goal: Plan of Care Review  Outcome: Ongoing (interventions implemented as appropriate)  Ms Todd stated that her abdominal wound dressings were not changed on Friday. Both upper rt abdominal wounds cleansed with NS, pat dry, and packed with AG packing gauze, and lower midline wound also cleansed with NS, pat dry, and pack with AG packing gauze. Safety measures maintained. Call light is within reach. Will continue with plan of care.

## 2019-05-05 NOTE — PLAN OF CARE
Problem: Adult Inpatient Plan of Care  Goal: Plan of Care Review  Outcome: Ongoing (interventions implemented as appropriate)  Ms Todd is sitting up in bed at this time She is talking with her guest at the bedside. No distress noted. Safety measures maintained. Call light remains in reach. Will continue with plan of care.

## 2019-05-05 NOTE — NURSING
Ms Todd was escorted off unit in stable condition via w/c per wheelchair van service for HD appointment.

## 2019-05-05 NOTE — PT/OT/SLP PROGRESS
Physical Therapy  Treatment    Jenni Todd   MRN: [id number]   Admitting Diagnosis: Problem with dialysis access    PT Received On: 05/05/19  Total Time (min): (--)       Billable Minutes:  Gait Training 12, Therapeutic Activity 11 and Therapeutic Exercise 22    Treatment Type: Treatment  PT/PTA: PTA     PTA Visit Number: 4       General Precautions: Standard, diabetic, fall(cardiac)  Orthopedic Precautions: N/A   Braces: N/A    Spiritual, Cultural Beliefs, Buddhist Practices, Values that Affect Care: no    Subjective:  Communicated with nursing prior to session.  Pt agreed to work with therapy.     Pain/Comfort  Pain Rating 1: 0/10  Pain Rating Post-Intervention 1: 0/10    Objective:  Patient found seated bedside chair.        AM-PAC 6 CLICK MOBILITY  Total Score:14    Bed Mobility:  Sit>Supine: not performed  Supine>Sit: not performed    Transfers:  Sit<>Stand: w/ rollator and SBA (x3 trials)   Stand Pivot Transfer: bedside chair to w/c w/ rollator    Gait:  Amb x2 trials (48ft and 42ft)  W/ rollator and SBA    Wheelchair Mobility:  Patient propels w/c x2 trials of 120ft each trial w/ BUE and (S)     Therex:  B LE therex 2x10 reps:    -AP   -LAQ   -Hip Flexion    -GS    Additional Treatment:  -Recumbent stepper x12 min L4    Patient left up in chair with call button in reach and family present.    Assessment:  Jenni Todd is a 48 y.o. female with a medical diagnosis of Problem with dialysis access.  Pt tolerated treatment well, and will continue to benefit from PT services at this time. Continue with PT POC as indicated.    Rehab identified problem list/impairments: weakness, impaired endurance, impaired sensation, impaired self care skills, impaired functional mobilty, gait instability, impaired balance, decreased coordination, decreased lower extremity function, impaired cardiopulmonary response to activity    Rehab potential is good.    Activity tolerance: Good    Discharge recommendations: home  health PT     Barriers to discharge: Decreased caregiver support    Equipment recommendations: commode     GOALS:   Multidisciplinary Problems     Physical Therapy Goals        Problem: Physical Therapy Goal    Goal Priority Disciplines Outcome Goal Variances Interventions   Physical Therapy Goal     PT, PT/OT Ongoing (interventions implemented as appropriate)     Description:  Goals to be met by: 2019 (2 weeks)    Patient will increase functional independence with mobility by performin. Supine to sit with Modified [place]  2. Sit to supine with Modified [place]  3. Sit to stand transfer with SBA met  4. Bed to chair transfer with SBA using Rolling Walker or Rollator  5. Gait  x 30 feet with SBA using Rolling Walker or Rollator met  6. Wheelchair propulsion x100 feet with Supervision using bilateral upper extremities  7. Ascend/Descend 4 inch curb step with Stand-by Assistance using Rolling Walker or Rollator (on hold for now due to medical status)  8. Stand for 2 minutes with Stand-by Assistance using Rolling Walker or Rollator   9. Lower extremity exercise program x30 reps per handout, with assistance as needed                          PLAN:    Patient to be seen 5 x/week  to address the above listed problems via gait training, therapeutic activities, therapeutic exercises  Plan of Care expires: 19  Plan of Care reviewed with: patient, family    Amelia Wilson, JORGITO  2019

## 2019-05-05 NOTE — PLAN OF CARE
Problem: Physical Therapy Goal  Goal: Physical Therapy Goal  Goals to be met by: 2019 (2 weeks)    Patient will increase functional independence with mobility by performin. Supine to sit with Modified [place]  2. Sit to supine with Modified [place]  3. Sit to stand transfer with SBA met  4. Bed to chair transfer with SBA using Rolling Walker or Rollator  5. Gait  x 30 feet with SBA using Rolling Walker or Rollator met  6. Wheelchair propulsion x100 feet with Supervision using bilateral upper extremities  7. Ascend/Descend 4 inch curb step with Stand-by Assistance using Rolling Walker or Rollator (on hold for now due to medical status)  8. Stand for 2 minutes with Stand-by Assistance using Rolling Walker or Rollator   9. Lower extremity exercise program x30 reps per handout, with assistance as needed         Goals remain appropriate. Continue with PT POC as indicated.

## 2019-05-05 NOTE — PLAN OF CARE
Problem: Adult Inpatient Plan of Care  Goal: Plan of Care Review  Outcome: Ongoing (interventions implemented as appropriate)     05/05/19 0505   Plan of Care Review   Plan of Care Reviewed With patient       Problem: Fall Injury Risk  Goal: Absence of Fall and Fall-Related Injury    Intervention: Promote Injury-Free Environment     05/05/19 0505   Optimize [place] and Functional Mobility   Environmental Safety Modification assistive device/personal items within reach   Optimize Balance and Safe Activity   Safety Promotion/Fall Prevention lighting adjusted;medications reviewed;instructed to call staff for mobility

## 2019-05-06 NOTE — PLAN OF CARE
Problem: Physical Therapy Goal  Goal: Physical Therapy Goal  Goals to be met by: 2019 (2 weeks)    Patient will increase functional independence with mobility by performin. Supine to sit with Modified [place]  2. Sit to supine with Modified [place]  3. Sit to stand transfer with SBA met  4. Bed to chair transfer with SBA using Rolling Walker or Rollator  5. Gait  x 30 feet with SBA using Rolling Walker or Rollator met  6. Wheelchair propulsion x100 feet with Supervision using bilateral upper extremities  7. Ascend/Descend 4 inch curb step with Stand-by Assistance using Rolling Walker or Rollator (on hold for now due to medical status)  8. Stand for 2 minutes with Stand-by Assistance using Rolling Walker or Rollator   9. Lower extremity exercise program x30 reps per handout, with assistance as needed         Outcome: Ongoing (interventions implemented as appropriate)  Goals remain appropriate

## 2019-05-06 NOTE — PROGRESS NOTES
[facility]                                  Skilled Nursing Facility                   Progress Note     Admit Date: 4/22/2019  JESSY 5/13/2019  Principal Problem:  Problem with dialysis access   HPI obtained from patient interview and chart review     Chief Complaint: Revaluation of medical treatment and therapy status: Lab review    HPI: Mrs. Todd is 48 year old female with PMHx of of coronary artery disease, chronic systolic and diastolic heart failure (EF 25%), and ESRF on peritoneal dialysis since 2004 presented to [facility] on 2/21 with abdominal pain. She was initiated on treatment for peritonitis and had removal of peritoneal catheter. She developed active GI bleed and associated encephalopathy. Patient had emergent EGD 3/13 that showed erosive esophagitis with small ulcer and clotted blood in the gastric fundus. The following day she had repeat EGD that found one non-bleeding cratered gastric ulcer with clean ulcer base. She was then transfer to [facility] ICU for vascular surgery assistance in dialysis access placement. She had RUE graft 3/22 placed, but it clotted. She has poor vascular options and vascular surgery recommended that she continue with peritoneal access if possible. She is currently being dialyzed with permacath in left groin placed 3/28. She developed NSTEMI day after groin catheter placement. Cardiology recommended goal directed medical therapy. Patient was transferred here to [facility] on 4/4 for debility. She returned to [facility] ER on 4/6 after her dialysis access did not work. TPA was instilled in her permacath and she was able to undergo a full dialysis session.     Interval Hx: All labs reviewed.  Patient's potassium elevated at 5.3, creatinine is 5.0.  H&H is slightly improved to 7.8/25 from 7.6/25.  Patient's WBCs are 14.8 from 12.8.  Patient has remained afebrile.  Patient reports a nonproductive,  intermittent cough that is new for the past few days.  Patient denies any mucus production.  Patient is anuric.  Patient does not appear ill nor to be in any distress. Continue HD on Tuesday, Thursday,  with one extra session this week due to extra fluid around abdomen. Patient progessing with PT/OT. Patient medically complex. Continuing to follow and treat all acute and chronic conditions.    Past Medical History: Patient has a past medical history of Abnormal finding on Pap smear, HPV DNA positive (), Anemia associated with chronic renal failure, Blood type B+, Bulging discs - symptomatic , CAD (coronary artery disease), Cardiomyopathy (3/13/2019), Diabetes mellitus, type 2, ESRD (end stage renal disease) (2004), FSGS (focal segmental glomerulosclerosis), Hyperlipidemia, Hypertension (), Neuropathy, NSTEMI (non-ST elevated myocardial infarction) (3/29/2019), Obesity, Secondary hyperparathyroidism, renal, TIA (transient ischemic attack), and Uterine fibroid.    Past Surgical History: Patient has a past surgical history that includes Peritoneal catheter insertion; Umbilical hernia repair;  section, classic; Dialysis fistula creation; Cardiac catheterization; Incision and drainage of wound (Left, ); Open reduction and internal fixation (ORIF) of injury of hip (Left, 2018); Colonoscopy (N/A, 2018); Esophagogastroduodenoscopy (N/A, 3/13/2019); Esophagogastroduodenoscopy (N/A, 3/14/2019); Peritoneal catheter removal (N/A, 3/14/2019); Insertion of tunneled central venous hemodialysis catheter (N/A, 3/18/2019); Phlebography (Right, 3/20/2019); Placement of arteriovenous graft (Right, 3/22/2019); Insertion of tunneled central venous hemodialysis catheter (N/A, 3/28/2019); Left heart catheterization (Left, 4/15/2019); Insertion of intra-aortic balloon assist device (4/15/2019); and Coronary angiography (N/A, 4/15/2019).    Social History: Patient reports that she has never  smoked. She has never used smokeless tobacco. She reports that she does not drink alcohol or use drugs.    Family History: family history includes Cancer in her maternal grandmother and paternal grandfather; Diabetes in her maternal aunt and paternal aunt.    Allergies: Patient is allergic to clindamycin; flagyl [metronidazole hcl]; and metronidazole.    ROS  Constitutional: Negative for fever and malaise/fatigue.   Eyes: Negative for blurred vision, double vision and discharge.   Respiratory: + intermittent nonproductive for cough. Neg for shortness of breath and wheezing.    Cardiovascular: Negative for chest pain, palpitations, claudication, leg swelling and PND.   Gastrointestinal: Negative for abdominal pain, constipation, diarrhea, nausea and vomiting. + increased abdominal girth  Genitourinary:  Pt is Anuric  Musculoskeletal:  + generalized weakness. Negative for back pain and myalgias.   Skin: Negative for itching and rash.   Neurological: Negative for dizziness, speech change, seizures, and headaches.   Psychiatric/Behavioral: Negative for depression. The patient is not nervous/anxious.      PEx  Temp:  [97.1 °F (36.2 °C)-98.4 °F (36.9 °C)]   Pulse:  [91-99]   Resp:  [18-20]   BP: (104-122)/(68-80)   SpO2:  [97 %-98 %]   Body mass index is 32.46 kg/m².      Constitutional: Patient appears well-developed and in no distress.   HENT:   Head: Normocephalic and atraumatic.   Eyes: Pupils are equal, round, and reactive to light.   Neck: Normal range of motion. Neck supple.   Cardiovascular: Normal rate, regular rhythm and normal heart sounds.    Pulmonary/Chest: Effort normal and breath sounds are clear  Abdominal: Soft. Bowel sounds are normal. + Distention .  Musculoskeletal: Normal range of motion. + generalized weakness  Neurological: Alert and oriented to person, place, and time.   Skin: Skin is warm and dry. + Lt femoral HD cath present, intact without any signs of infection, dressing changed today.   Abdominal wounds from previous PD catheter. See details below.   Psychiatric: Normal mood and affect. Behavior is normal.                 Incision/Site 03/19/19 Right Abdomen   Date First Assessed: 03/19/19   Side: Right  Location: Abdomen   Wound Image    Incision WDL ex   Dressing Appearance Intact   Drainage Amount Small   Drainage Characteristics/Odor Serosanguineous   Appearance Pink   Red (%), Wound Tissue Color 90 %   Yellow (%), Wound Tissue Color 10 %   Periwound Area Scar tissue   Wound Edges Open   Wound Length (cm) 0.5 cm   Wound Width (cm) 0.5 cm   Wound Depth (cm) 0.3 cm   Wound Volume (cm^3) 0.08 cm^3   Wound Surface Area (cm^2) 0.25 cm^2   Care Cleansed with:;Wound cleanser   Dressing Removed;Applied;Changed;Hydrofiber;Silver;Foam   Dressing Change Due 05/01/19        Incision/Site 03/19/19 Abdomen lower   Date First Assessed: 03/19/19   Location: Abdomen  Orientation: lower   Wound Image    Incision WDL ex   Dressing Appearance Dry   Drainage Amount Moderate   Drainage Characteristics/Odor Serosanguineous   Appearance Pink;Smooth   Red (%), Wound Tissue Color 70 %   Yellow (%), Wound Tissue Color 30 %   Periwound Area Scar tissue   Wound Edges Open   Wound Length (cm) 0.6 cm   Wound Width (cm) 2.2 cm   Wound Depth (cm) 3.8 cm   Wound Volume (cm^3) 5.02 cm^3   Wound Surface Area (cm^2) 1.32 cm^2   Care Cleansed with:;Sterile normal saline   Dressing Removed;Applied;Changed;Silver;Hydrofiber;Foam   Dressing Change Due 05/01/19        Incision/Site 03/14/19 1445 Left Abdomen   Date First Assessed/Time First Assessed: 03/14/19 1445   Side: Left  Location: Abdomen   Wound Image    Incision WDL ex   Dressing Appearance Intact   Drainage Amount Small   Drainage Characteristics/Odor Serosanguineous   Appearance Pink;Fibrin   Red (%), Wound Tissue Color 80 %   Yellow (%), Wound Tissue Color 20 %   Periwound Area Scar tissue   Wound Edges Open   Wound Length (cm) 0.6 cm   Wound Width (cm) 1.8 cm   Wound Depth  (cm) 1 cm   Wound Volume (cm^3) 1.08 cm^3   Wound Surface Area (cm^2) 1.08 cm^2   Care Cleansed with:;Sterile normal saline   Dressing Removed;Applied;Changed;Hydrofiber;Silver;Foam   Dressing Change Due 04/29/19        Assessment and Plan:    ESRD (end stage renal disease) on dialysis  peritonitis due to peritoneal dialysis catheter  Leukocytosis  -Previously PD patient; admitted 2/21 at [facility] for peritonitis, had PD catheter removed for recurrent peritonitis & transitioned to HD. Abx complete for peritonitis  Bilateral IJ tunneled catheters were attempted without success due to thrombus. Now has a temporary dialysis catheter in her left fem that was placed 3/28.  Prior to permacath placement, she had a next day graft placed by Dr. Leger on 3/22, but it was never used and was clotted 1 day post-op. Patient refused to have the graft declotted, so the permacatch was placed at that time.  -4/25 HD TTS, HD today  -4/30 abd distention is greatly improved with HD treatments.  -5/2  WBC at 12.89, continue to monitor. Bun/Cr at 25/5.8, continue dialysis TTS.  - 5/6 WBC increased to  14.  Patient has been afebrile and does not appear to look ill.  She is having regular bowel movements.  She does have a slight nonproductive intermittent cough.  She is anuric. pts fem line did not have proper dressing. Charge nurse replaced dressing with proper central line dressing. Will repeat CBC tomorrow, 5/7   - Patient went to general surgery appointment last Friday on 05/03.  She will return to their clinic in 2 weeks to see about a PD replacement catheter.  She will need cardiac clearance and for her abdomen wounds to heal.     Anemia in chronic kidney disease, on chronic dialysis  -Continue Epo per neph and protonix  -5/2 H&H at 7.6/25.3, continue Epo  - 5/6 H&H improved to 7.8/25     Hyperkalemia  - 5/6 K at 5.3.  Patient states she just had a large bowel movement will hold off on treatment for now.  Repeating BMP tomorrow,  5/7    Hypotension  -4/27 discontinued Coreg and Irbesartan for systolic blood pressures in the 80s  -4/29 Increased midodrine to 10 mg bid  -4/30 Hypotension is improved today, currently at 108/71, s/p increasing midodrine dosing yesterday.   - 5/6 stable- patient's 24 hr blood pressure range is 104/68 to 122/80    Hypomagnesemia  -4/29 initiated mag oxide tab 400 mg daily  -5/2 increased mag oxide to 400 mg bid  - 5/6 improved to 1.6 continue Mag-Ox    Hemorrhoids  -5/2 initiated phenyleph-shark radha oil-mo-pet ointment 1 applicator to be applied to rectum b.i.d.  - 5/6 stable, continues to use medication    CONTINUE    high risk for imbalanced nutrition  -5/1 Continued 2000 diabetic diet. Increased fluid restriction to 1500 ml/day. Discontinued renal restriction due to food preference and the need for increase nutrional intake. Goal intake is 85% Of EEN    Debility  - Continue with PT/OT for gait training and strengthening and restoration of ADL's   - Encourage mobility, OOB in chair, and early ambulation as appropriate  - Fall precautions   - Monitor for bowel and bladder dysfunction  - Monitor for and prevent skin breakdown and pressure ulcers  - Continue DVT prophylaxis with heparin   -4/30 Patient is reporting that she does not want to take heparin due to her haveing an acute anemic episode requiring transition to ED in the past. Patient educated on the importance of dvt prophylaxis with decrease mobility, she has decided to continue to refuse heparin.     NSTEMI (non-ST elevated myocardial infarction)  Acute systolic heart failure  -s/p ACS protocol (DAPT, statin, heparin drip stopped)/NSTEMI   -CT surgery/CABG eval revealed, not a candidate due to comorbidities.   -HTS consult as outpatient to discuss advanced options  -LV EF of 25%  -4/25 Continue HD as outpatient for volume removal  -Continue Asa, statin, and plavix daily  -Discontinued Coreg   -Discontinue irbesartan  -F/u with cards, Ndandu    Pressure  injury of sacral region, stage 2  Abdominal wounds  -Wound care consulted  - continue care for abdominal wounds- cleanse with normal saline and gauze pack wound with Aquacel Ag and secure with foam border to change on Monday Wednesday and Friday or as needed if soiled  - continue wound care for pressure injury of sacral area- nursing to cleanse buttocks perineal area b.i.d. and as needed with wipes apply a thin layer of triad paste b.i.d. and as needed.  No diapers.  Turn q.2 hours.       DM II with neuropathy   - Continue Gabapentin 300 mg bid    Future Appointments   Date Time Provider Department Center   5/16/2019  4:00 PM [facility]

## 2019-05-06 NOTE — PT/OT/SLP PROGRESS
"Physical Therapy  Treatment    Jenni Todd   MRN: [id number]   Admitting Diagnosis: Problem with dialysis access    PT Received On: 05/06/19          Billable Minutes:  Gait Training 10, Therapeutic Activity 15 and Therapeutic Exercise 20    Treatment Type: Treatment  PT/PTA: PTA     PTA Visit Number: 5       General Precautions: Standard, diabetic, fall(cardiac)  Orthopedic Precautions: N/A   Braces: N/A    Spiritual, Cultural Beliefs, Baptist Practices, Values that Affect Care: no    Subjective:  "feel bloated with all this fluid, extra day for dialysis this week" agreeable to therapy    Pain/Comfort  Pain Rating 1: 0/10  Pain Rating Post-Intervention 1: 0/10    Objective:   Patient found with: (in wc)     AM-PAC 6 CLICK MOBILITY  Total Score:16    Transfers:  Sit<>Stand: with rollator SBA    Gait:BP pre gait 100/64  after gait 107/69   Amb with rollator SBA ~ 50 ft no LOB, declined further distance/trials today 2* to fatigue    Wheelchair Mobility:  Patient propels w/c ~125 ft x 2 pre/post session S BUE     Therex:  2x10 reps AP,GS,LAQ,hip flex,abd/add    Additional Treatment:  Recumbent cross  x 10 min L-2  2 rest breaks     Patient left up in chair with call button in reach and belongings inreach.    Assessment:  Jenni Todd is a 48 y.o. female with a medical diagnosis of Problem with dialysis access.  Pt tolerated fair, limited by fatigue, discomfort from fluid, pt would continue to benefit from skilled PT services to improve overall functional mobility, strength and endurance.  .    Rehab identified problem list/impairments: weakness, impaired endurance, impaired sensation, impaired self care skills, impaired functional mobilty, gait instability, impaired balance, decreased coordination, decreased lower extremity function, impaired cardiopulmonary response to activity    Rehab potential is good.    Activity tolerance: Fair    Discharge recommendations: home health PT "     Barriers to discharge: Decreased caregiver support    Equipment recommendations: commode     GOALS:   Multidisciplinary Problems     Physical Therapy Goals        Problem: Physical Therapy Goal    Goal Priority Disciplines Outcome Goal Variances Interventions   Physical Therapy Goal     PT, PT/OT Ongoing (interventions implemented as appropriate)     Description:  Goals to be met by: 2019 (2 weeks)    Patient will increase functional independence with mobility by performin. Supine to sit with Modified [place]  2. Sit to supine with Modified [place]  3. Sit to stand transfer with SBA met  4. Bed to chair transfer with SBA using Rolling Walker or Rollator  5. Gait  x 30 feet with SBA using Rolling Walker or Rollator met  6. Wheelchair propulsion x100 feet with Supervision using bilateral upper extremities  7. Ascend/Descend 4 inch curb step with Stand-by Assistance using Rolling Walker or Rollator (on hold for now due to medical status)  8. Stand for 2 minutes with Stand-by Assistance using Rolling Walker or Rollator   9. Lower extremity exercise program x30 reps per handout, with assistance as needed                          PLAN:    Patient to be seen 5 x/week  to address the above listed problems via gait training, therapeutic activities, therapeutic exercises  Plan of Care expires: 19  Plan of Care reviewed with: patient, family    Sailaja Gallegos, JORGITO  2019

## 2019-05-06 NOTE — PLAN OF CARE
Problem: Adult Inpatient Plan of Care  Goal: Plan of Care Review  Outcome: Ongoing (interventions implemented as appropriate)     05/06/19 0514   Plan of Care Review   Plan of Care Reviewed With patient       Problem: Fall Injury Risk  Goal: Absence of Fall and Fall-Related Injury    Intervention: Promote Injury-Free Environment     05/06/19 0514   Optimize [place] and Functional Mobility   Environmental Safety Modification assistive device/personal items within reach   Optimize Balance and Safe Activity   Safety Promotion/Fall Prevention lighting adjusted;medications reviewed;instructed to call staff for mobility

## 2019-05-07 NOTE — PLAN OF CARE
Problem: Occupational Therapy Goal  Goal: Occupational Therapy Goal  Goals to be met by: 05-06-19     Patient will increase functional independence with ADLs by performing:    UE Dressing with Mod I  LE Dressing with Mod I.  Grooming while seated with Mod I .  Toileting from bedside commode with Mod I for hygiene and clothing management.   Bathing from  edge of bed with SBA.  Supine to sit with Modified [place].  Stand pivot transfers with Mod I .  Toilet transfer to bedside commode with Mod I.  Pt. To be I with HEP for BUE to improve endurance level     Pt. Tolerated session well.

## 2019-05-07 NOTE — PLAN OF CARE
Problem: Adult Inpatient Plan of Care  Goal: Plan of Care Review  Recommendation: Continue 2000kcal DM diet with 1500ml fluid restriction; consider adding renal diet restriction if hyperkalemia persists     Goals: PO to meet 85% of EEN by next RD visit

## 2019-05-07 NOTE — PROGRESS NOTES
[facility]                                  Skilled Nursing Facility                   Progress Note     Admit Date: 4/22/2019  JESSY 5/13/2019  Principal Problem:  Problem with dialysis access   HPI obtained from patient interview and chart review     Chief Complaint: Revaluation of medical treatment and therapy status: Lab review    HPI: Mrs. Todd is 48 year old female with PMHx of of coronary artery disease, chronic systolic and diastolic heart failure (EF 25%), and ESRF on peritoneal dialysis since 2004 presented to [facility] on 2/21 with abdominal pain. She was initiated on treatment for peritonitis and had removal of peritoneal catheter. She developed active GI bleed and associated encephalopathy. Patient had emergent EGD 3/13 that showed erosive esophagitis with small ulcer and clotted blood in the gastric fundus. The following day she had repeat EGD that found one non-bleeding cratered gastric ulcer with clean ulcer base. She was then transfer to [facility] ICU for vascular surgery assistance in dialysis access placement. She had RUE graft 3/22 placed, but it clotted. She has poor vascular options and vascular surgery recommended that she continue with peritoneal access if possible. She is currently being dialyzed with permacath in left groin placed 3/28. She developed NSTEMI day after groin catheter placement. Cardiology recommended goal directed medical therapy. Patient was transferred here to [facility] on 4/4 for debility. She returned to [facility] ER on 4/6 after her dialysis access did not work. TPA was instilled in her permacath and she was able to undergo a full dialysis session.     Interval Hx: All labs reviewed. Patient's potassium elevated at 5.4, creatinine is 6.1. Dialysis TTS.   H&H is slightly improved to 8.2/27.4, continue to trend. Patient's WBCs are 15.3 today, but remains afebrile, will continue to trend.  Continue HD on Tuesday, Thursday,  with one extra session this week due to extra fluid around abdomen. Patient progessing with PT/OT. Patient medically complex. Continuing to follow and treat all acute and chronic conditions.     Past Medical History: Patient has a past medical history of Abnormal finding on Pap smear, HPV DNA positive (), Anemia associated with chronic renal failure, Blood type B+, Bulging discs - symptomatic , CAD (coronary artery disease), Cardiomyopathy (3/13/2019), Diabetes mellitus, type 2, ESRD (end stage renal disease) (2004), FSGS (focal segmental glomerulosclerosis), Hyperlipidemia, Hypertension (), Neuropathy, NSTEMI (non-ST elevated myocardial infarction) (3/29/2019), Obesity, Secondary hyperparathyroidism, renal, TIA (transient ischemic attack), and Uterine fibroid.    Past Surgical History: Patient has a past surgical history that includes Peritoneal catheter insertion; Umbilical hernia repair;  section, classic; Dialysis fistula creation; Cardiac catheterization; Incision and drainage of wound (Left, ); Open reduction and internal fixation (ORIF) of injury of hip (Left, 2018); Colonoscopy (N/A, 2018); Esophagogastroduodenoscopy (N/A, 3/13/2019); Esophagogastroduodenoscopy (N/A, 3/14/2019); Peritoneal catheter removal (N/A, 3/14/2019); Insertion of tunneled central venous hemodialysis catheter (N/A, 3/18/2019); Phlebography (Right, 3/20/2019); Placement of arteriovenous graft (Right, 3/22/2019); Insertion of tunneled central venous hemodialysis catheter (N/A, 3/28/2019); Left heart catheterization (Left, 4/15/2019); Insertion of intra-aortic balloon assist device (4/15/2019); and Coronary angiography (N/A, 4/15/2019).    Social History: Patient reports that she has never smoked. She has never used smokeless tobacco. She reports that she does not drink alcohol or use drugs.    Family History: family history includes Cancer in her maternal  grandmother and paternal grandfather; Diabetes in her maternal aunt and paternal aunt.    Allergies: Patient is allergic to clindamycin; flagyl [metronidazole hcl]; and metronidazole.    ROS  Constitutional: Negative for fever and malaise/fatigue.   Eyes: Negative for blurred vision, double vision and discharge.   Respiratory: + intermittent nonproductive for cough. Neg for shortness of breath and wheezing.    Cardiovascular: Negative for chest pain, palpitations, claudication, leg swelling and PND.   Gastrointestinal: Negative for abdominal pain, constipation, diarrhea, nausea and vomiting. + increased abdominal girth  Genitourinary:  Pt is Anuric  Musculoskeletal:  + generalized weakness. Negative for back pain and myalgias.   Skin: Negative for itching and rash.   Neurological: Negative for dizziness, speech change, seizures, and headaches.   Psychiatric/Behavioral: Negative for depression. The patient is not nervous/anxious.      PEx  Temp:  [98.4 °F (36.9 °C)]   Pulse:  [97]   Resp:  [18]   BP: (118)/(71)   SpO2:  [98 %]   Body mass index is 32.32 kg/m².      Constitutional: Patient appears well-developed and in no distress.   HENT:   Head: Normocephalic and atraumatic.   Eyes: Pupils are equal, round, and reactive to light.   Neck: Normal range of motion. Neck supple.   Cardiovascular: Normal rate, regular rhythm and normal heart sounds.    Pulmonary/Chest: Effort normal and breath sounds are clear  Abdominal: Soft. Bowel sounds are normal. + Distention .  Musculoskeletal: Normal range of motion. + generalized weakness  Neurological: Alert and oriented to person, place, and time.   Skin: Skin is warm and dry. + Lt femoral HD cath present, intact without any signs of infection, dressing changed today.  Abdominal wounds from previous PD catheter. See details below.   Psychiatric: Normal mood and affect. Behavior is normal.      Assessment and Plan:    ESRD (end stage renal disease) on dialysis  peritonitis due  to peritoneal dialysis catheter  Leukocytosis  -Previously PD patient; admitted 2/21 at [facility] for peritonitis, had PD catheter removed for recurrent peritonitis & transitioned to HD. Abx complete for peritonitis  Bilateral IJ tunneled catheters were attempted without success due to thrombus. Now has a temporary dialysis catheter in her left fem that was placed 3/28.  Prior to permacath placement, she had a next day graft placed by Dr. Leger on 3/22, but it was never used and was clotted 1 day post-op. Patient refused to have the graft declotted, so the permacatch was placed at that time.  -4/25 HD TTS, HD today  -4/30 abd distention is greatly improved with HD treatments.  -5/2  WBC at 12.89, continue to monitor. Bun/Cr at 25/5.8, continue dialysis TTS.  - 5/6 WBC increased to  14.  Patient has been afebrile and does not appear to look ill.  She is having regular bowel movements.  She does have a slight nonproductive intermittent cough.  She is anuric. pts fem line did not have proper dressing. Charge nurse replaced dressing with proper central line dressing. Will repeat CBC tomorrow, 5/7   - Patient went to general surgery appointment last Friday on 05/03.  She will return to their clinic in 2 weeks to see about a PD replacement catheter.  She will need cardiac clearance and for her abdomen wounds to heal.   -5/7 Bun/cr 26/6.1, TTS for dialysis. Wbc currently at 15.3, but remains afebrile, continue to trend.    Anemia in chronic kidney disease, on chronic dialysis  - Continue Epo per neph and protonix  - 5/2 H&H at 7.6/25.3, continue Epo  - 5/6 H&H improved to 7.8/25   - 5/7 H&H at 8.2/27.4    Hyperkalemia  - 5/6 K at 5.3.  Patient states she just had a large bowel movement will hold off on treatment for now.  Repeating BMP tomorrow, 5/7  - 5/7 K at 5.4, TTS for dialysis    Hypotension  -4/27 discontinued Coreg and Irbesartan for systolic blood pressures in the 80s  -4/29 Increased midodrine to 10 mg bid  -4/30  Hypotension is improved today, currently at 108/71, s/p increasing midodrine dosing yesterday.   - 5/6 stable- patient's 24 hr blood pressure range is 104/68 to 122/80    Hypomagnesemia  -4/29 initiated mag oxide tab 400 mg daily  -5/2 increased mag oxide to 400 mg bid  - 5/6 improved to 1.6 continue Mag-Ox    Hemorrhoids  -5/2 initiated phenyleph-shark radha oil-mo-pet ointment 1 applicator to be applied to rectum b.i.d.  - 5/6 stable, continues to use medication    CONTINUE    high risk for imbalanced nutrition  -5/1 Continued 2000 diabetic diet. Increased fluid restriction to 1500 ml/day. Discontinued renal restriction due to food preference and the need for increase nutrional intake. Goal intake is 85% Of EEN    Debility  - Continue with PT/OT for gait training and strengthening and restoration of ADL's   - Encourage mobility, OOB in chair, and early ambulation as appropriate  - Fall precautions   - Monitor for bowel and bladder dysfunction  - Monitor for and prevent skin breakdown and pressure ulcers  - Continue DVT prophylaxis with heparin   -4/30 Patient is reporting that she does not want to take heparin due to her haveing an acute anemic episode requiring transition to ED in the past. Patient educated on the importance of dvt prophylaxis with decrease mobility, she has decided to continue to refuse heparin.     NSTEMI (non-ST elevated myocardial infarction)  Acute systolic heart failure  -s/p ACS protocol (DAPT, statin, heparin drip stopped)/NSTEMI   -CT surgery/CABG eval revealed, not a candidate due to comorbidities.   -HTS consult as outpatient to discuss advanced options  -LV EF of 25%  -4/25 Continue HD as outpatient for volume removal  -Continue Asa, statin, and plavix daily  -Discontinued Coreg   -Discontinue irbesartan  -F/u with cards, Ndandu    Pressure injury of sacral region, stage 2  Abdominal wounds  - Wound care consulted  - continue care for abdominal wounds- cleanse with normal saline and  gauze pack wound with Aquacel Ag and secure with foam border to change on Monday Wednesday and Friday or as needed if soiled  - continue wound care for pressure injury of sacral area- nursing to cleanse buttocks perineal area b.i.d. and as needed with wipes apply a thin layer of triad paste b.i.d. and as needed.  No diapers.  Turn q.2 hours.       DM II with neuropathy   - Continue Gabapentin 300 mg bid    Future Appointments   Date Time Provider Department Center   5/16/2019  4:00 PM Sung Alonso MD [facility] KRYSTLE Monterroso NP

## 2019-05-07 NOTE — TREATMENT PLAN
Rehab Services' DME recommendations    Jenni Todd  MRN: [id number]    [x] 3 in 1 commode Standard    [x] Home health PT, OT, Aide and Nurse    Brook Donnelly, PT 5/7/2019

## 2019-05-07 NOTE — PLAN OF CARE
Problem: Physical Therapy Goal  Goal: Physical Therapy Goal  Goals to be met by: 2019 (2 weeks)    Patient will increase functional independence with mobility by performin. Supine to sit with Modified [place]  2. Sit to supine with Modified [place]  3. Sit to stand transfer with SBA met  4. Bed to chair transfer with SBA using Rolling Walker or Rollator  5. Gait  x 30 feet with SBA using Rolling Walker or Rollator met  6. Wheelchair propulsion x100 feet with Supervision using bilateral upper extremities  7. Ascend/Descend 4 inch curb step with Stand-by Assistance using Rolling Walker or Rollator (on hold for now due to medical status)  8. Stand for 2 minutes with Stand-by Assistance using Rolling Walker or Rollator   9. Lower extremity exercise program x30 reps per handout, with assistance as needed         Outcome: Ongoing (interventions implemented as appropriate)  LTGs remain appropriate. Pt will continue PT POC.    Brook Donnelly, PT  2019

## 2019-05-07 NOTE — PROGRESS NOTES
"[facility]  Adult Nutrition  Progress Note    SUMMARY       Recommendations    Recommendation: Continue 2000kcal DM diet with 1500ml fluid restriction; consider adding renal diet restriction if hyperkalemia persists    Goals: PO to meet 85% of EEN by next RD visit    Nutrition Goal Status: progressing towards goal  Communication of RD Recs: reviewed with physician(POC)    Reason for Assessment    Reason For Assessment: RD follow-up  Diagnosis: (Debility, sp septic shock)  Relevant Medical History: CAD, CHF, DM2, ESRD, HLD, HTN  Interdisciplinary Rounds: did not attend  General Information Comments: Pt unavailable x multiple visit attempts. Noted with good PO intake. No N/V/D/C noted, LBM yesterday. No physical s/s of malnutrition noted. Elevated potassium noted.   Nutrition Discharge Planning: PO to meet 85% of needs by next RD follow up.     Nutrition Risk Screen    Nutrition Risk Screen: no indicators present    Nutrition/Diet History    Patient Reported Diet/Restrictions/Preferences: diabetic diet  Food Preferences: no fish, no cooked carrots, no mixed vegetables  Spiritual, Cultural Beliefs, Druze Practices, Values that Affect Care: no  Supplemental Drinks or Food Habits: (refuses ONS)  Food Allergies: NKFA  Factors Affecting Nutritional Intake: None identified at this time    Anthropometrics    Temp: 98.3 °F (36.8 °C)  Height Method: Stated  Height: 5' 7" (170.2 cm)  Height (inches): 67 in  Weight Method: Standard Scale  Weight: 93.6 kg (206 lb 5.6 oz)  Weight (lb): 206.35 lb  Ideal Body Weight (IBW), Female: 135 lb  % Ideal Body Weight, Female (lb): 152.85 lb  BMI (Calculated): 32.4  BMI Grade: 30 - 34.9- obesity - grade I  Weight Loss: unintentional  Usual Body Weight (UBW), k kg  % Usual Body Weight: 98.83  % Weight Change From Usual Weight: -1.37 %     Lab/Procedures/Meds    Pertinent Labs: Reviewed  Lab Results   Component Value Date    K 5.4 (H) 2019    BUN 26 (H) " 05/07/2019    CREATININE 6.1 (H) 05/07/2019    CALCIUM 8.2 (L) 05/07/2019    PHOS 3.1 05/06/2019    MG 1.5 (L) 05/07/2019    [place] 8.6 (A) 05/07/2019     POCT Glucose   Date Value Ref Range Status   05/07/2019 167 (H) 70 - 110 mg/dL Final   05/06/2019 194 (H) 70 - 110 mg/dL Final   05/06/2019 140 (H) 70 - 110 mg/dL Final   05/06/2019 208 (H) 70 - 110 mg/dL Final   05/06/2019 179 (H) 70 - 110 mg/dL Final     Lab Results   Component Value Date    HGBA1C 6.1 (H) 04/12/2019     Pertinent Meds: Reviewed  Scheduled Meds:   aspirin  81 mg Oral Daily    atorvastatin  40 mg Oral QHS    calcitRIOL  0.5 mcg Oral Daily    cinacalcet  90 mg Oral Daily    clopidogrel  75 mg Oral Daily    gabapentin  300 mg Oral BID    heparin (porcine)  5,000 Units Subcutaneous Q12H    magnesium oxide  400 mg Oral BID    midodrine  10 mg Oral BID AC    pantoprazole  40 mg Oral Daily    phenyleph-shark radha oil-mo-pet  1 applicator Rectal BID    sevelamer carbonate  800 mg Oral TID WM    vitamin renal formula (B-complex-vitamin c-folic acid)  1 capsule Oral Daily     Estimated/Assessed Needs    Weight Used For Calorie Calculations: 100.6 kg (221 lb 12.5 oz)  Energy Calorie Requirements (kcal): 2078  Energy Need Method: Meriwether-St Jeor(x 1.25 (PAL))  Protein Requirements: 92g(x 1.5 x IBW kg)  Weight Used For Protein Calculations: 61.4 kg (135 lb 5.8 oz)  Fluid Requirements (mL): 1500 ml per MD  RDA Method (mL): 2078  CHO Requirement: 50% of calories    Nutrition Prescription Ordered    Current Diet Order: 2000kcal DM diet  Nutrition Order Comments: 1500ml fluid restrictions  Oral Nutrition Supplement: none(pt refuses)    Evaluation of Received Nutrient/Fluid Intake    Energy Calories Required: meeting needs  Protein Required: meeting needs  Fluid Required: meeting needs  Tolerance: tolerating  % Intake of Estimated Energy Needs: 75 - 100 %  % Meal Intake: 75 - 100 %    Nutrition Risk    Level of Risk/Frequency of Follow-up: low      Assessment and Plan    Nutrition Problem  Increased nutrient needs, protein  Related to (etiology):   Wound healing  Signs and Symptoms (as evidenced by):   Stage 2 pressure ulcer  Interventions:  Collaboration with other providers  Nutrition Diagnosis Status:   Continues     Monitor and Evaluation    Food and Nutrient Intake: energy intake, food and beverage intake  Food and Nutrient Adminstration: diet order  Knowledge/Beliefs/Attitudes: food and nutrition knowledge/skill, beliefs and attitudes  Physical Activity and Function: nutrition-related ADLs and IADLs  Anthropometric Measurements: weight, weight change  Biochemical Data, Medical Tests and Procedures: electrolyte and renal panel, gastrointestinal profile, glucose/endocrine profile, inflammatory profile, lipid profile  Nutrition-Focused Physical Findings: overall appearance, extremities, muscles and bones, skin     Malnutrition Assessment     Skin (Micronutrient): wounds unhealed(stage 2 pressure injury)   Energy Intake (Malnutrition): less than 75% for greater than or equal to 1 month   Orbital Region (Subcutaneous Fat Loss): well nourished  Upper Arm Region (Subcutaneous Fat Loss): well nourished  Thoracic and Lumbar Region: well nourished   [place] Region (Muscle Loss): well nourished  Clavicle Bone Region (Muscle Loss): well nourished  Scapular Bone Region (Muscle Loss): well nourished  Dorsal Hand (Muscle Loss): well nourished      Nutrition Follow-Up    RD Follow-up?: Yes

## 2019-05-07 NOTE — PT/OT/SLP PROGRESS
Occupational Therapy  Treatment    Jenni Todd   MRN: [id number]   Admitting Diagnosis: Problem with dialysis access    OT Date of Treatment: 05/07/19       Billable Minutes:  Self Care/Home Management 36    General Precautions: Standard, fall, diabetic(cardiac)  Orthopedic Precautions: N/A  Braces: N/A    Spiritual, Cultural Beliefs, Latter-day Practices, Values that Affect Care: no    Subjective:  Communicated with nurse prior to session.  Pt. Agreeable to therapy session    Pain/Comfort  Pain Rating 1: 0/10  Pain Rating Post-Intervention 1: 0/10    Objective:  Patient found with: (Left sidelying in bed)    Occupational Performance:    Bed Mobility:    · Patient completed Supine to Sit with modified independence     Functional Mobility/Transfers:  · Patient completed Sit <> Stand Transfer with stand by assistance  with  no assistive device   · Patient completed Bed <> Chair Transfer using Stand Pivot technique with stand by assistance with no assistive device  · Functional Mobility: not tested    Activities of Daily Living:  · Grooming: modified independence seated at sink  · Bathing: contact guard assistance seated at sink  · Upper Body Dressing: supervision seated after set up  · Lower Body Dressing: minimum assistance to don shoes all other aspects performed with SBA for pants and undergarment    [facility] 6 Click:  [facility] Total Score: 21    OT Exercises: not performed    Additional Treatment:  Pt. Educated on safety with ADL task performance    Patient left up in chair with call button in reach eating breakfast    ASSESSMENT:  Jenni Todd is a 48 y.o. female with a medical diagnosis of Problem with dialysis access and presents with deficits in functional mobility, ADL tasks as well as endurance. Pt.  Is continuing to make improvements with performance of ADL tasks. .    Rehab identified problem list/impairments: impaired endurance, impaired self care skills, impaired functional mobilty, impaired  cardiopulmonary response to activity    Rehab potential is good    Activity tolerance: Good    Discharge recommendations: home health OT(may require S initially)     Barriers to discharge: Decreased caregiver support     Equipment recommendations: commode     GOALS:   Multidisciplinary Problems     Occupational Therapy Goals        Problem: Occupational Therapy Goal    Goal Priority Disciplines Outcome Interventions   Occupational Therapy Goal     OT, PT/OT Ongoing (interventions implemented as appropriate)    Description:  Goals to be met by: 05-06-19 Goals extended to 05-13-19    Patient will increase functional independence with ADLs by performing:    UE Dressing with Mod I  LE Dressing with Mod I.  Grooming while seated with Mod I .  Toileting from bedside commode with Mod I for hygiene and clothing management.   Bathing from  edge of bed with SBA.  Supine to sit with Modified [place].  Stand pivot transfers with Mod I .  Toilet transfer to bedside commode with Mod I.  Pt. To be I with HEP for BUE to improve endurance level                       Plan:  Patient to be seen 5 x/week to address the above listed problems via self-care/home management, therapeutic activities, therapeutic exercises  Plan of Care expires: 05/23/19  Plan of Care reviewed with: patient    SABRA Mcguire  05/07/2019

## 2019-05-07 NOTE — PT/OT/SLP PROGRESS
Physical Therapy  Treatment    Jenni Todd   MRN: [id number]   Admitting Diagnosis: Problem with dialysis access    PT Received On: 05/07/19        Billable Minutes:  Gait Training 15, Therapeutic Exercise 23 and Total Time 38    Treatment Type: Treatment  PT/PTA: PT     PTA Visit Number: 0       General Precautions: Standard, diabetic, fall(cardiac)  Orthopedic Precautions: N/A   Braces: N/A    Spiritual, Cultural Beliefs, Jain Practices, Values that Affect Care: no    Subjective:  Communicated with patient prior to session.  Pt agreeable to session.    Pain/Comfort  Pain Rating 1: 0/10    Objective:  Patient found sitting in w/c.       AM-PAC 6 CLICK MOBILITY  Total Score:16    Bed Mobility:  Not performed    Transfers:  Sit<>Stand: to/from w/c (2 trials) w/ rollator and SBA  Pt locks brakes prior to transfers w/o cueing    Gait:  Amb 2 trials (61ft and 35ft) w/ rollator and close SBA for safety  Swing through gait pattern w/ good step length/height  Limited by fatigue    BP post gait trial #1= 111/73mmHg, HR 124bpm     Therex:  Seated BLE therex 2x15 reps (GS, HF, LAQ, AP)    Additional Treatment:  Seated UBE x15min to inc overall endurance    Patient left up in chair with call button in reach.    Assessment:  Jenni Todd is a 48 y.o. female with a medical diagnosis of Problem with dialysis access.  Pt senait session well w/ good participation. She was able to tolerate 2 gait trials today which is an improvement. Her BP also remained WNL following gait trials. She is progressing well overall and will continue PT POC.    Rehab identified problem list/impairments: weakness, impaired endurance, impaired sensation, impaired self care skills, impaired functional mobilty, gait instability, impaired balance, decreased coordination, decreased lower extremity function, impaired cardiopulmonary response to activity    Rehab potential is good.    Activity tolerance: Good    Discharge recommendations: home  health PT     Barriers to discharge: Decreased caregiver support    Equipment recommendations: commode     GOALS:   Multidisciplinary Problems     Physical Therapy Goals        Problem: Physical Therapy Goal    Goal Priority Disciplines Outcome Goal Variances Interventions   Physical Therapy Goal     PT, PT/OT Ongoing (interventions implemented as appropriate)     Description:  Goals to be met by: 19    Patient will increase functional independence with mobility by performin. Supine to sit with Modified [place]  2. Sit to supine with Modified [place]  3. Sit to stand transfer with SBA met  4. Bed to chair transfer with SBA using Rolling Walker or Rollator  5. Gait  x 30 feet with SBA using Rolling Walker or Rollator met  6. Wheelchair propulsion x100 feet with Supervision using bilateral upper extremities  7. Ascend/Descend 4 inch curb step with Stand-by Assistance using Rolling Walker or Rollator (on hold for now due to medical status)  8. Stand for 2 minutes with Stand-by Assistance using Rolling Walker or Rollator   9. Lower extremity exercise program x30 reps per handout, with assistance as needed                           PLAN:    Patient to be seen 5 x/week  to address the above listed problems via gait training, therapeutic activities, therapeutic exercises  Plan of Care expires: 19  Plan of Care reviewed with: patient, family    Brook Donnelly, PT  2019

## 2019-05-07 NOTE — TREATMENT PLAN
Rehab Services' DME recommendations    Jenni Todd  MRN: [id number]      [x[facility]       [x] Home health PT, OT and Aide  SABRA Mcguire 5/7/2019

## 2019-05-08 NOTE — PT/OT/SLP PROGRESS
Physical Therapy  Treatment    Jenni Todd   MRN: [id number]   Admitting Diagnosis: Problem with dialysis access    PT Received On: 05/08/19  Total Time (min): (--)       Billable Minutes:  Gait Training 10, Therapeutic Activity 10 and Therapeutic Exercise 18    Treatment Type: Treatment  PT/PTA: PTA     PTA Visit Number: 1       General Precautions: Standard, diabetic, fall  Orthopedic Precautions: N/A   Braces: N/A    Spiritual, Cultural Beliefs, Sabianist Practices, Values that Affect Care: no    Subjective:  Communicated with nursing prior to session.  Pt agreed to work with therapy.     Pain/Comfort  Pain Rating 1: 0/10  Pain Rating Post-Intervention 1: 0/10    Objective:  Patient found seated w/c.       AM-PAC 6 CLICK MOBILITY  Total Score:16    Bed Mobility:  Sit>Supine: not performed  Supine>Sit: not performed    Transfers:  Sit<>Stand: SBA w/ rollator (x4 trials)   Stand Pivot Transfer: w/c to recumbent stepper w/ rollator and SBA; recumbent stepper to w/c w/ rollator and SBA    Gait:  Amb x2 trials 56ft and 38ft w/ rollator and SBA. Swing-through gait pattern. No LOB noted. Seated rest break b/w trials.       Therex:  BLE therex 2x10 reps:    -AP   -LAQ   -Hip Flexion    -GS   -hip abd/add    Additional Treatment:  -Recumbent stepper x11 min L3    Patient left up in chair with call button in reach.    Assessment:  Jenni Todd is a 48 y.o. female with a medical diagnosis of Problem with dialysis access.  Pt tolerated treatment well, and will continue to benefit from PT services at this time. Continue with PT POC as indicated.    Rehab identified problem list/impairments: weakness, impaired endurance, impaired sensation, impaired self care skills, impaired functional mobilty, gait instability, impaired balance, decreased coordination, decreased lower extremity function, impaired cardiopulmonary response to activity    Rehab potential is good.    Activity tolerance: Good    Discharge  recommendations: home health PT     Barriers to discharge: Decreased caregiver support    Equipment recommendations: commode     GOALS:   Multidisciplinary Problems     Physical Therapy Goals        Problem: Physical Therapy Goal    Goal Priority Disciplines Outcome Goal Variances Interventions   Physical Therapy Goal     PT, PT/OT Ongoing (interventions implemented as appropriate)     Description:  Goals to be met by: 19    Patient will increase functional independence with mobility by performin. Supine to sit with Modified [place]  2. Sit to supine with Modified [place]  3. Sit to stand transfer with SBA met  4. Bed to chair transfer with SBA using Rolling Walker or Rollator  5. Gait  x 30 feet with SBA using Rolling Walker or Rollator met  6. Wheelchair propulsion x100 feet with Supervision using bilateral upper extremities  7. Ascend/Descend 4 inch curb step with Stand-by Assistance using Rolling Walker or Rollator (on hold for now due to medical status)  8. Stand for 2 minutes with Stand-by Assistance using Rolling Walker or Rollator   9. Lower extremity exercise program x30 reps per handout, with assistance as needed                           PLAN:    Patient to be seen 5 x/week  to address the above listed problems via gait training, therapeutic activities, therapeutic exercises  Plan of Care expires: 19  Plan of Care reviewed with: patient    Amelia Katie, PTA  2019

## 2019-05-08 NOTE — PLAN OF CARE
Problem: Physical Therapy Goal  Goal: Physical Therapy Goal  Goals to be met by: 19    Patient will increase functional independence with mobility by performin. Supine to sit with Modified [place]  2. Sit to supine with Modified [place]  3. Sit to stand transfer with SBA met  4. Bed to chair transfer with SBA using Rolling Walker or Rollator  5. Gait  x 30 feet with SBA using Rolling Walker or Rollator met  6. Wheelchair propulsion x100 feet with Supervision using bilateral upper extremities  7. Ascend/Descend 4 inch curb step with Stand-by Assistance using Rolling Walker or Rollator (on hold for now due to medical status)  8. Stand for 2 minutes with Stand-by Assistance using Rolling Walker or Rollator   9. Lower extremity exercise program x30 reps per handout, with assistance as needed          Goals remain appropriate. Continue with PT POC as indicated.

## 2019-05-09 NOTE — PLAN OF CARE
Problem: Adult Inpatient Plan of Care  Goal: Plan of Care Review  Outcome: Ongoing (interventions implemented as appropriate)  AAOX4. Patient free of falls throughout shift. Call light in reach and bed in lowest position. Patient went to Dialysis today. Afebrile. Acu checks AC HS and BG controlled with diet and PRN sliding scale insulin. Dressings to abdomen changed and packed per wound care orders. Will continue to monitor.

## 2019-05-09 NOTE — PLAN OF CARE
Problem: Occupational Therapy Goal  Goal: Occupational Therapy Goal  Goals to be met by: 05-06-19 Goals extended to 05-13-19    Patient will increase functional independence with ADLs by performing:    UE Dressing with Mod I  LE Dressing with Mod I.  Grooming while seated with Mod I .  Toileting from bedside commode with Mod I for hygiene and clothing management.   Bathing from  edge of bed with SBA.  Supine to sit with Modified [place].  Stand pivot transfers with Mod I .  Toilet transfer to bedside commode with Mod I.  Pt. To be I with HEP for BUE to improve endurance level      Pt. Tired this am.

## 2019-05-09 NOTE — PT/OT/SLP PROGRESS
Occupational Therapy  Treatment    Jenni Todd   MRN: [id number]   Admitting Diagnosis: Problem with dialysis access    OT Date of Treatment: 05/09/19       Billable Minutes:  Self Care/Home Management 40    General Precautions: Standard, fall, diabetic(cardiac)  Orthopedic Precautions: N/A  Braces: N/A    Spiritual, Cultural Beliefs, Scientologist Practices, Values that Affect Care: no    Subjective:  Communicated with nurse prior to session.  Pt. Reported that she was tired.     Pain/Comfort  Pain Rating 1: 0/10  Pain Rating Post-Intervention 1: 0/10    Objective:  Patient found with: (right side lying in bed)    Occupational Performance:    Bed Mobility:    · Patient completed Supine to Sit with minimum assistance  At trunk from right side but pt. Did report she usually gets up from the left side of the bed.     Functional Mobility/Transfers:  · Patient completed Sit <> Stand Transfer with stand by assistance  with  bed rail   · Patient completed Bed <> Chair Transfer using Stand Pivot technique with stand by assistance with no assistive device  · Patient completed Toilet Transfer Stand Pivot technique with stand by assistance with  bedside commode  · Functional Mobility: Able to propel self in w/c in room Mod I    Activities of Daily Living:  · Grooming: modified independence seated at sink in w/c  · Bathing: stand by assistance sponge bath at sink   · Upper Body Dressing: modified independence seated to don pull over shirt  · Lower Body Dressing: minimum assistance on this date to initially thread pants 2/2 shoes on already but typically ESTHER to don pants  · Toileting: stand by assistance with use of [facility] 6 Click:  AMPA Total Score: 21    OT Exercises: not tested    Additional Treatment:  Pt. Educated on safety with ADL task performance/transfers      Patient left up in chair with call button in reach    ASSESSMENT:  Jenni Todd is a 48 y.o. female with a medical diagnosis of Problem with  dialysis access and presents with deficits in self-care skills as well as functional mobility and endurance.  Pt. Has made improvements in therapy with ADL task performance but noted to be fatigued this am.  Pt. Would benefit from continued OT services to maximize safety with ADL tasks.     Rehab identified problem list/impairments: impaired endurance, impaired self care skills, impaired functional mobilty, impaired cardiopulmonary response to activity    Rehab potential is good    Activity tolerance: Good    Discharge recommendations: home health OT(may require S initially)     Barriers to discharge: Decreased caregiver support     Equipment recommendations: commode     GOALS:   Multidisciplinary Problems     Occupational Therapy Goals        Problem: Occupational Therapy Goal    Goal Priority Disciplines Outcome Interventions   Occupational Therapy Goal     OT, PT/OT Ongoing (interventions implemented as appropriate)    Description:  Goals to be met by: 05-06-19 Goals extended to 05-13-19    Patient will increase functional independence with ADLs by performing:    UE Dressing with Mod I  MET  LE Dressing with Mod I.  Grooming while seated with Mod I .  MET  Toileting from bedside commode with Mod I for hygiene and clothing management.   Bathing from  edge of bed with SBA.  Supine to sit with Modified [place].  Stand pivot transfers with Mod I .  Toilet transfer to bedside commode with Mod I.  Pt. To be I with HEP for BUE to improve endurance level                        Plan:  Patient to be seen 5 x/week to address the above listed problems via self-care/home management, therapeutic activities, therapeutic exercises  Plan of Care expires: 05/23/19  Plan of Care reviewed with: patient    SABRA Mcguire  05/09/2019

## 2019-05-09 NOTE — PLAN OF CARE
Problem: Adult Inpatient Plan of Care  Goal: Plan of Care Review  Outcome: Ongoing (interventions implemented as appropriate)     05/09/19 0616   Plan of Care Review   Plan of Care Reviewed With patient       Problem: Fall Injury Risk  Goal: Absence of Fall and Fall-Related Injury    Intervention: Promote Injury-Free Environment     05/09/19 0616   Optimize [place] and Functional Mobility   Environmental Safety Modification assistive device/personal items within reach   Optimize Balance and Safe Activity   Safety Promotion/Fall Prevention lighting adjusted;medications reviewed;instructed to call staff for mobility

## 2019-05-09 NOTE — PT/OT/SLP PROGRESS
Physical Therapy  Treatment    Jenni Todd   MRN: [id number]   Admitting Diagnosis: Problem with dialysis access    PT Received On: 05/09/19  Total Time (min): (--)       Billable Minutes:  Gait Training 10, Therapeutic Activity 10 and Therapeutic Exercise 18    Treatment Type: Treatment  PT/PTA: PTA     PTA Visit Number: 2       General Precautions: Standard, diabetic, fall  Orthopedic Precautions: N/A   Braces: N/A    Spiritual, Cultural Beliefs, Hindu Practices, Values that Affect Care: no    Subjective:  Communicated with nursing prior to session.  Pt agreed to work with therapy.     Pain/Comfort  Pain Rating 1: 0/10  Pain Rating Post-Intervention 1: 0/10    Objective:  Patient found seated wc./       AM-PAC 6 CLICK MOBILITY  Total Score:16    Bed Mobility:  Sit>Supine:not performed  Supine>Sit: not performed    Transfers:  Sit<>Stand: to/from w/c x2 trials w/ rollator and SBA  Stand Pivot Transfer: w/c<>recumbent stepper w/ rollator and SBA    Gait:  Amb 82ft w/ rollator and SBA. No reports of dizziness or LOB noted.      Wheelchair Mobility:  Patient propels w/c 120ft w/ BUE and SPV.     Therex:  BLE therex x20 reps:    -AP   -LAQ   -Hip Flexion    -hip abd/add   -GS    Additional Treatment:  Recumbent stepper x10 min L4    Patient left up in chair with transportation to be taken off floor to an apt. .    Assessment:  Jenni Todd is a 48 y.o. female with a medical diagnosis of Problem with dialysis access.  Pt tolerated treatment well, and will continue to benefit from PT services at this time. Continue with PT POC as indicated.    Rehab identified problem list/impairments: weakness, impaired endurance, impaired sensation, impaired self care skills, impaired functional mobilty, gait instability, impaired balance, decreased coordination, decreased lower extremity function, impaired cardiopulmonary response to activity    Rehab potential is good.    Activity tolerance: Good    Discharge  recommendations: home health PT     Barriers to discharge: Decreased caregiver support    Equipment recommendations: commode     GOALS:   Multidisciplinary Problems     Physical Therapy Goals        Problem: Physical Therapy Goal    Goal Priority Disciplines Outcome Goal Variances Interventions   Physical Therapy Goal     PT, PT/OT Ongoing (interventions implemented as appropriate)     Description:  Goals to be met by: 19    Patient will increase functional independence with mobility by performin. Supine to sit with Modified [place]  2. Sit to supine with Modified [place]  3. Sit to stand transfer with SBA. Met   4. Bed to chair transfer with SBA using Rolling Walker or Rollator. met  5. Gait  x 30 feet with SBA using Rolling Walker or Rollator. Met   6. Wheelchair propulsion x100 feet with Supervision using bilateral upper extremities. Met   7. Ascend/Descend 4 inch curb step with Stand-by Assistance using Rolling Walker or Rollator (on hold for now due to medical status)  8. Stand for 2 minutes with Stand-by Assistance using Rolling Walker or Rollator   9. Lower extremity exercise program x30 reps per handout, with assistance as needed                            PLAN:    Patient to be seen 5 x/week  to address the above listed problems via gait training, therapeutic activities, therapeutic exercises  Plan of Care expires: 19  Plan of Care reviewed with: patient    Amelia Katie, PTA  2019

## 2019-05-09 NOTE — PLAN OF CARE
Problem: Physical Therapy Goal  Goal: Physical Therapy Goal  Goals to be met by: 19    Patient will increase functional independence with mobility by performin. Supine to sit with Modified [place]  2. Sit to supine with Modified [place]  3. Sit to stand transfer with SBA. Met   4. Bed to chair transfer with SBA using Rolling Walker or Rollator. met  5. Gait  x 30 feet with SBA using Rolling Walker or Rollator. Met   6. Wheelchair propulsion x100 feet with Supervision using bilateral upper extremities. Met   7. Ascend/Descend 4 inch curb step with Stand-by Assistance using Rolling Walker or Rollator (on hold for now due to medical status)  8. Stand for 2 minutes with Stand-by Assistance using Rolling Walker or Rollator   9. Lower extremity exercise program x30 reps per handout, with assistance as needed          Goals remain appropriate. Continue with PT POC as indicated.

## 2019-05-09 NOTE — NURSING
Patient arrived back on unit via wheelchair. BP 87/56 patient is asymptomatic, Midodrine 10 mg 1615 dose given a little early and encouraged fluids. NP notified via secure chat. Will recheck BP in 1 hour.

## 2019-05-10 NOTE — PT/OT/SLP PROGRESS
Occupational Therapy  Treatment    Jenni Todd   MRN: [id number]   Admitting Diagnosis: Problem with dialysis access    OT Date of Treatment: 05/10/19       Billable Minutes:  Self Care/Home Management 23 and Therapeutic Exercise 15    General Precautions: Standard, fall, diabetic(cardiac)  Orthopedic Precautions: N/A  Braces: N/A    Spiritual, Cultural Beliefs, Temple Practices, Values that Affect Care: no    Subjective:  Communicated with nurse prior to session.  Pt reported she was ready to go home    Pain/Comfort  Pain Rating 1: 0/10  Pain Rating Post-Intervention 1: 0/10    Objective:      Seated in w/c in room with daughter present  Occupational Performance:    Bed Mobility:    · Not tested      Functional Mobility/Transfers:  · Patient completed Sit <> Stand Transfer with stand by assistance  with  4 wheeled walker   · Patient completed Bed <> Chair Transfer using Stand Pivot technique with stand by assistance with 4 wheeled walker  · Patient completed Toilet Transfer Stand Pivot technique with stand by assistance with  4 wheeled walker  · Functional Mobility: Pt. Ambulated in room to bathroom with rollator and SBA    Activities of Daily Living:  · OT reviewed pt. Mod I for UBD, grooming and required SBA for toileting, LBD ;   · Pt. Was able to don/doff shoes on this date with vc's for technique  ·     AMPA 6 Click:  AMPAC Total Score: 21    OT Exercises: AROM with 1 # dowel for all major planes of BUE motion x 2 sets 10 reps    Additional Treatment:  HEP issued on this date for BUE to help pt. Increase level of endurance.   FT performed reviewing levelof assist pt. Requires for ADL tasks    Patient left up in chair with daughter assisting pt. back to room     ASSESSMENT:  Jenni Todd is a 48 y.o. female with a medical diagnosis of Problem with dialysis access and presents with decreased ADL task performance for standing portions as well as deficits with endurance and mobility.  Pt. Has made  good progress in therapy sessions but will continue to benefit from  services to maximize independence and safety with ADL tasks . Daughter demonstated understanding of level of assist pt. Requires with ADL tasks.    Rehab identified problem list/impairments: impaired endurance, impaired self care skills, impaired functional mobilty, impaired cardiopulmonary response to activity    Rehab potential is good    Activity tolerance: Good    Discharge recommendations: home health OT(may require S initially)     Barriers to discharge: Decreased caregiver support     Equipment recommendations: commode     GOALS:   Multidisciplinary Problems     Occupational Therapy Goals        Problem: Occupational Therapy Goal    Goal Priority Disciplines Outcome Interventions   Occupational Therapy Goal     OT, PT/OT Ongoing (interventions implemented as appropriate)    Description:  Goals to be met by: 05-06-19 Goals extended to 05-13-19    Patient will increase functional independence with ADLs by performing:    UE Dressing with Mod I  MET  LE Dressing with Mod I.  Grooming while seated with Mod I .  MET  Toileting from bedside commode with Mod I for hygiene and clothing management.   Bathing from  edge of bed with SBA.  Supine to sit with Modified [place].  Stand pivot transfers with Mod I .  Toilet transfer to bedside commode with Mod I.  Pt. To be I with HEP for BUE to improve endurance level                        Plan:  Patient to be seen 5 x/week to address the above listed problems via self-care/home management, therapeutic activities, therapeutic exercises  Plan of Care expires: 05/23/19  Plan of Care reviewed with: patient    SABRA Mcguire  05/10/2019

## 2019-05-10 NOTE — PLAN OF CARE
Problem: Adult Inpatient Plan of Care  Goal: Plan of Care Review  Outcome: Ongoing (interventions implemented as appropriate)     05/10/19 0552   Plan of Care Review   Plan of Care Reviewed With patient       Problem: Fall Injury Risk  Goal: Absence of Fall and Fall-Related Injury    Intervention: Promote Injury-Free Environment     05/10/19 0552   Optimize [place] and Functional Mobility   Environmental Safety Modification assistive device/personal items within reach   Optimize Balance and Safe Activity   Safety Promotion/Fall Prevention lighting adjusted;medications reviewed;instructed to call staff for mobility

## 2019-05-10 NOTE — PT/OT/SLP PROGRESS
Physical Therapy  Treatment    Jenni Todd   MRN: [id number]   Admitting Diagnosis: Problem with dialysis access    PT Received On: 05/10/19  Total Time (min): (--)       Billable Minutes:  Gait Training 10, Therapeutic Activity 10 and Therapeutic Exercise 18    Treatment Type: Treatment  PT/PTA: PTA     PTA Visit Number: 3       General Precautions: Standard, diabetic, fall  Orthopedic Precautions: N/A   Braces: N/A    Spiritual, Cultural Beliefs, Orthodox Practices, Values that Affect Care: no    Subjective:  Communicated with nursing prior to session.  Pt agreed to work with therapy.     Pain/Comfort  Pain Rating 1: 0/10  Pain Rating Post-Intervention 1: 0/10    Objective:  Patient found seated w/c with  daughter present for family training.      AM-PAC 6 CLICK MOBILITY  Total Score:16    Bed Mobility:  Sit>Supine:on bed w/ SPV  Supine>Sit: on bed w/ SPV    Transfers:  Sit<>Stand: w/ rollator and SPV (x3 trials)   Stand Pivot Transfer: w/c to recumbent stepper w/ rollator and SPV    Gait:  Amb x76ft w/ rollator and SBA. No LOB noted.      Therex:  BLE therex 2x10 reps:    -AP   -LAQ   -Hip Flexion    -GS   -Hip abd/add    Additional Treatment:  -Recumbent stepper L3 x12 min   -Family training completed on this date. Reviewed/discussed pts current level of functional mobility.     Patient left up in chair with call button in reach and daughter present.    Assessment:  Jenni Todd is a 48 y.o. female with a medical diagnosis of Problem with dialysis access.  Pt tolerated treatment well, and will continue to benefit from PT services at this time. Continue with PT POC as indicated.    Rehab identified problem list/impairments: weakness, impaired endurance, impaired sensation, impaired self care skills, impaired functional mobilty, gait instability, impaired balance, decreased coordination, decreased lower extremity function, impaired cardiopulmonary response to activity    Rehab potential is  good.    Activity tolerance: Good    Discharge recommendations: home health PT     Barriers to discharge: Decreased caregiver support    Equipment recommendations: commode     GOALS:   Multidisciplinary Problems     Physical Therapy Goals        Problem: Physical Therapy Goal    Goal Priority Disciplines Outcome Goal Variances Interventions   Physical Therapy Goal     PT, PT/OT Ongoing (interventions implemented as appropriate)     Description:  Goals to be met by: 19    Patient will increase functional independence with mobility by performin. Supine to sit with Modified [place]  2. Sit to supine with Modified [place]  3. Sit to stand transfer with SBA. Met   4. Bed to chair transfer with SBA using Rolling Walker or Rollator. met  5. Gait  x 30 feet with SBA using Rolling Walker or Rollator. Met   6. Wheelchair propulsion x100 feet with Supervision using bilateral upper extremities. Met   7. Ascend/Descend 4 inch curb step with Stand-by Assistance using Rolling Walker or Rollator (on hold for now due to medical status)  8. Stand for 2 minutes with Stand-by Assistance using Rolling Walker or Rollator   9. Lower extremity exercise program x30 reps per handout, with assistance as needed                            PLAN:    Patient to be seen 5 x/week  to address the above listed problems via gait training, therapeutic activities, therapeutic exercises  Plan of Care expires: 19  Plan of Care reviewed with: patient    Amelia Katie, PTA  05/10/2019

## 2019-05-10 NOTE — PLAN OF CARE
Problem: Occupational Therapy Goal  Goal: Occupational Therapy Goal  Goals to be met by: 05-06-19 Goals extended to 05-13-19    Patient will increase functional independence with ADLs by performing:    UE Dressing with Mod I  MET  LE Dressing with Mod I.  Grooming while seated with Mod I .  MET  Toileting from bedside commode with Mod I for hygiene and clothing management.   Bathing from  edge of bed with SBA.  Supine to sit with Modified [place].  Stand pivot transfers with Mod I .  Toilet transfer to bedside commode with Mod I.  Pt. To be I with HEP for BUE to improve endurance level       Pt. Family educated on assisting pt. With ADL tasks on this date

## 2019-05-10 NOTE — PROGRESS NOTES
[facility]                                  Skilled Nursing Facility                   Progress Note     Admit Date: 4/22/2019  JESSY 5/13/2019  Principal Problem:  Problem with dialysis access   HPI obtained from patient interview and chart review     Chief Complaint: Revaluation of medical treatment and therapy status: Lab review    HPI: Mrs. Todd is 48 year old female with PMHx of of coronary artery disease, chronic systolic and diastolic heart failure (EF 25%), and ESRF on peritoneal dialysis since 2004 presented to [facility] on 2/21 with abdominal pain. She was initiated on treatment for peritonitis and had removal of peritoneal catheter. She developed active GI bleed and associated encephalopathy. Patient had emergent EGD 3/13 that showed erosive esophagitis with small ulcer and clotted blood in the gastric fundus. The following day she had repeat EGD that found one non-bleeding cratered gastric ulcer with clean ulcer base. She was then transfer to [facility] ICU for vascular surgery assistance in dialysis access placement. She had RUE graft 3/22 placed, but it clotted. She has poor vascular options and vascular surgery recommended that she continue with peritoneal access if possible. She is currently being dialyzed with permacath in left groin placed 3/28. She developed NSTEMI day after groin catheter placement. Cardiology recommended goal directed medical therapy. Patient was transferred here to [facility] on 4/4 for debility. She returned to [facility] ER on 4/6 after her dialysis access did not work. TPA was instilled in her permacath and she was able to undergo a full dialysis session.     Interval Hx: All labs reviewed, creatinine is 3.6, Continue HD on Tuesday, Thursday, Saturdays. Patient's WBCs are done to 13.52 today, and remains afebrile, will continue to trend and adding a procalcitonin to Mondays labs. Patient progessing  with PT/OT. Patient medically complex. Continuing to follow and treat all acute and chronic conditions.     Past Medical History: Patient has a past medical history of Abnormal finding on Pap smear, HPV DNA positive (), Anemia associated with chronic renal failure, Blood type B+, Bulging discs - symptomatic , CAD (coronary artery disease), Cardiomyopathy (3/13/2019), Diabetes mellitus, type 2, ESRD (end stage renal disease) (2004), FSGS (focal segmental glomerulosclerosis), Hyperlipidemia, Hypertension (), Neuropathy, NSTEMI (non-ST elevated myocardial infarction) (3/29/2019), Obesity, Secondary hyperparathyroidism, renal, TIA (transient ischemic attack), and Uterine fibroid.    Past Surgical History: Patient has a past surgical history that includes Peritoneal catheter insertion; Umbilical hernia repair;  section, classic; Dialysis fistula creation; Cardiac catheterization; Incision and drainage of wound (Left, ); Open reduction and internal fixation (ORIF) of injury of hip (Left, 2018); Colonoscopy (N/A, 2018); Esophagogastroduodenoscopy (N/A, 3/13/2019); Esophagogastroduodenoscopy (N/A, 3/14/2019); Peritoneal catheter removal (N/A, 3/14/2019); Insertion of tunneled central venous hemodialysis catheter (N/A, 3/18/2019); Phlebography (Right, 3/20/2019); Placement of arteriovenous graft (Right, 3/22/2019); Insertion of tunneled central venous hemodialysis catheter (N/A, 3/28/2019); Left heart catheterization (Left, 4/15/2019); Insertion of intra-aortic balloon assist device (4/15/2019); and Coronary angiography (N/A, 4/15/2019).    Social History: Patient reports that she has never smoked. She has never used smokeless tobacco. She reports that she does not drink alcohol or use drugs.    Family History: family history includes Cancer in her maternal grandmother and paternal grandfather; Diabetes in her maternal aunt and paternal aunt.    Allergies: Patient is allergic to  clindamycin; flagyl [metronidazole hcl]; and metronidazole.    ROS  Constitutional: Negative for fever and malaise/fatigue.   Eyes: Negative for blurred vision, double vision and discharge.   Respiratory: + intermittent nonproductive for cough. Neg for shortness of breath and wheezing.    Cardiovascular: Negative for chest pain, palpitations, claudication, leg swelling and PND.   Gastrointestinal: Negative for abdominal pain, constipation, diarrhea, nausea and vomiting. + increased abdominal girth  Genitourinary:  Pt is Anuric  Musculoskeletal:  + generalized weakness. Negative for back pain and myalgias.   Skin: Negative for itching and rash.   Neurological: Negative for dizziness, speech change, seizures, and headaches.   Psychiatric/Behavioral: Negative for depression. The patient is not nervous/anxious.      PEx  Temp:  [96.8 °F (36 °C)-98.8 °F (37.1 °C)]   Pulse:  []   Resp:  [20]   BP: ()/(56-66)   SpO2:  [99 %-100 %]   Body mass index is 32.42 kg/m².      Constitutional: Patient appears well-developed and in no distress.   HENT:   Head: Normocephalic and atraumatic.   Eyes: Pupils are equal, round, and reactive to light.   Neck: Normal range of motion. Neck supple.   Cardiovascular: Normal rate, regular rhythm and normal heart sounds.    Pulmonary/Chest: Effort normal and breath sounds are clear  Abdominal: Soft. Bowel sounds are normal. + Distention .  Musculoskeletal: Normal range of motion. + generalized weakness  Neurological: Alert and oriented to person, place, and time.   Skin: Skin is warm and dry. + Lt femoral HD cath present, intact without any signs of infection, dressing changed today.  Abdominal wounds from previous PD catheter. See details below.   Psychiatric: Normal mood and affect. Behavior is normal.      Assessment and Plan:    ESRD (end stage renal disease) on dialysis  peritonitis due to peritoneal dialysis catheter  Leukocytosis  -Previously PD patient; admitted 2/21 at [facility]  for peritonitis, had PD catheter removed for recurrent peritonitis & transitioned to HD. Abx complete for peritonitis  Bilateral IJ tunneled catheters were attempted without success due to thrombus. Now has a temporary dialysis catheter in her left fem that was placed 3/28.  Prior to permacath placement, she had a next day graft placed by Dr. Leger on 3/22, but it was never used and was clotted 1 day post-op. Patient refused to have the graft declotted, so the permacatch was placed at that time.  -4/25 HD TTS, HD today  -4/30 abd distention is greatly improved with HD treatments.  -5/2  WBC at 12.89, continue to monitor. Bun/Cr at 25/5.8, continue dialysis TTS.  - 5/6 WBC increased to  14.  Patient has been afebrile and does not appear to look ill.  She is having regular bowel movements.  She does have a slight nonproductive intermittent cough.  She is anuric. pts fem line did not have proper dressing. Charge nurse replaced dressing with proper central line dressing. Will repeat CBC tomorrow, 5/7   - Patient went to general surgery appointment last Friday on 05/03.  She will return to their clinic in 2 weeks to see about a PD replacement catheter.  She will need cardiac clearance and for her abdomen wounds to heal.   -5/7 Bun/cr 26/6.1, TTS for dialysis. Wbc currently at 15.3, but remains afebrile, continue to trend.  -5/9 creatinine is 3.6, Continue HD on Tuesday, Thursday, Saturdays. Patient's WBCs are done to 13.52 today, and remains afebrile, will continue to trend and adding a procalcitonin to Mondays labs.     CONTINUED    Anemia in chronic kidney disease, on chronic dialysis  - Continue Epo per neph and protonix  - 5/2 H&H at 7.6/25.3, continue Epo  - 5/6 H&H improved to 7.8/25   - 5/7 H&H at 8.2/27.4    Hyperkalemia  - 5/6 K at 5.3.  Patient states she just had a large bowel movement will hold off on treatment for now.  Repeating BMP tomorrow, 5/7  - 5/7 K at 5.4, TTS for dialysis    Hypotension  -4/27  discontinued Coreg and Irbesartan for systolic blood pressures in the 80s  -4/29 Increased midodrine to 10 mg bid  -4/30 Hypotension is improved today, currently at 108/71, s/p increasing midodrine dosing yesterday.   - 5/6 stable- patient's 24 hr blood pressure range is 104/68 to 122/80    Hypomagnesemia  -4/29 initiated mag oxide tab 400 mg daily  -5/2 increased mag oxide to 400 mg bid  - 5/6 improved to 1.6 continue Mag-Ox    Hemorrhoids  -5/2 initiated phenyleph-shark radha oil-mo-pet ointment 1 applicator to be applied to rectum b.i.d.  - 5/6 stable, continues to use medication    high risk for imbalanced nutrition  -5/1 Continued 2000 diabetic diet. Increased fluid restriction to 1500 ml/day. Discontinued renal restriction due to food preference and the need for increase nutrional intake. Goal intake is 85% Of EEN    Debility  - Continue with PT/OT for gait training and strengthening and restoration of ADL's   - Encourage mobility, OOB in chair, and early ambulation as appropriate  - Fall precautions   - Monitor for bowel and bladder dysfunction  - Monitor for and prevent skin breakdown and pressure ulcers  - Continue DVT prophylaxis with heparin   -4/30 Patient is reporting that she does not want to take heparin due to her haveing an acute anemic episode requiring transition to ED in the past. Patient educated on the importance of dvt prophylaxis with decrease mobility, she has decided to continue to refuse heparin.     NSTEMI (non-ST elevated myocardial infarction)  Acute systolic heart failure  -s/p ACS protocol (DAPT, statin, heparin drip stopped)/NSTEMI   -CT surgery/CABG eval revealed, not a candidate due to comorbidities.   -HTS consult as outpatient to discuss advanced options  -LV EF of 25%  -4/25 Continue HD as outpatient for volume removal  -Continue Asa, statin, and plavix daily  -Discontinued Coreg   -Discontinue irbesartan  -F/u with Cinda burks    Pressure injury of sacral region, stage  2  Abdominal wounds  - Wound care consulted  - continue care for abdominal wounds- cleanse with normal saline and gauze pack wound with Aquacel Ag and secure with foam border to change on Monday Wednesday and Friday or as needed if soiled  - continue wound care for pressure injury of sacral area- nursing to cleanse buttocks perineal area b.i.d. and as needed with wipes apply a thin layer of triad paste b.i.d. and as needed.  No diapers.  Turn q.2 hours.       DM II with neuropathy   - Continue Gabapentin 300 mg bid    Future Appointments   Date Time Provider Department Center   5/16/2019  4:00 PM Sung Alonso MD [facility] KRYSTLE Monterroso NP

## 2019-05-10 NOTE — PLAN OF CARE
Problem: Physical Therapy Goal  Goal: Physical Therapy Goal  Goals to be met by: 19    Patient will increase functional independence with mobility by performin. Supine to sit with Modified [place]  2. Sit to supine with Modified [place]  3. Sit to stand transfer with SBA. Met   4. Bed to chair transfer with SBA using Rolling Walker or Rollator. met  5. Gait  x 30 feet with SBA using Rolling Walker or Rollator. Met   6. Wheelchair propulsion x100 feet with Supervision using bilateral upper extremities. Met   7. Ascend/Descend 4 inch curb step with Stand-by Assistance using Rolling Walker or Rollator (on hold for now due to medical status)  8. Stand for 2 minutes with Stand-by Assistance using Rolling Walker or Rollator   9. Lower extremity exercise program x30 reps per handout, with assistance as needed           Goals remain appropriate. Continue with PT POC as indicated.

## 2019-05-11 NOTE — PROGRESS NOTES
Patient taken to dialysis via w/c per [facility] transportation, ndn, snack provided. Pt's daughter was visiting.

## 2019-05-11 NOTE — PROGRESS NOTES
Patient back from dialysis, sitting at bedside now after using BSC, pt is aaox4, vitals taken and recorded, afebrile. Lt femoral hemodialysis catherer with two ports clamped, dry and dressing intact. Food tray served, pt denies pain but verbalize feeling tired. Call light in reach.pt insructed to call prn.

## 2019-05-11 NOTE — PLAN OF CARE
Problem: Adult Inpatient Plan of Care  Goal: Plan of Care Review  Outcome: Ongoing (interventions implemented as appropriate)  Free of falls throughout shift. Call light in reach and bed in lowest position. Afebrile. Acu checks AC HS and BG controlled with diet and PRN  Sliding scale insulin. Will continue to monitor.

## 2019-05-11 NOTE — PROGRESS NOTES
[facility]                                  Skilled Nursing Facility                   Progress Note     Admit Date: 4/22/2019  JESSY 5/13/2019  Principal Problem:  Problem with dialysis access   HPI obtained from patient interview and chart review     Chief Complaint: Patient requesting a visit for discharge planning for Monday and wound care eval    HPI: Mrs. Todd is 48 year old female with PMHx of of coronary artery disease, chronic systolic and diastolic heart failure (EF 25%), and ESRF on peritoneal dialysis since 2004 presented to [facility] on 2/21 with abdominal pain. She was initiated on treatment for peritonitis and had removal of peritoneal catheter. She developed active GI bleed and associated encephalopathy. Patient had emergent EGD 3/13 that showed erosive esophagitis with small ulcer and clotted blood in the gastric fundus. The following day she had repeat EGD that found one non-bleeding cratered gastric ulcer with clean ulcer base. She was then transfer to [facility] ICU for vascular surgery assistance in dialysis access placement. She had RUE graft 3/22 placed, but it clotted. She has poor vascular options and vascular surgery recommended that she continue with peritoneal access if possible. She is currently being dialyzed with permacath in left groin placed 3/28. She developed NSTEMI day after groin catheter placement. Cardiology recommended goal directed medical therapy. Patient was transferred here to [facility] on 4/4 for debility. She returned to [facility] ER on 4/6 after her dialysis access did not work. TPA was instilled in her permacath and she was able to undergo a full dialysis session.     Interval Hx: Patient requesting to discuss her concerns about her discharge plan. Patient updated on continued treatment plan and medication adjustments. Additionally, patient concerned about her abd wounds and requesting wound  care take a look at it before she leaves. Wound care eval initiated. Patient progessing with PT/OT. Patient medically complex. Continuing to follow and treat all acute and chronic conditions.     Past Medical History: Patient has a past medical history of Abnormal finding on Pap smear, HPV DNA positive (), Anemia associated with chronic renal failure, Blood type B+, Bulging discs - symptomatic , CAD (coronary artery disease), Cardiomyopathy (3/13/2019), Diabetes mellitus, type 2, ESRD (end stage renal disease) (2004), FSGS (focal segmental glomerulosclerosis), Hyperlipidemia, Hypertension (), Neuropathy, NSTEMI (non-ST elevated myocardial infarction) (3/29/2019), Obesity, Secondary hyperparathyroidism, renal, TIA (transient ischemic attack), and Uterine fibroid.    Past Surgical History: Patient has a past surgical history that includes Peritoneal catheter insertion; Umbilical hernia repair;  section, classic; Dialysis fistula creation; Cardiac catheterization; Incision and drainage of wound (Left, ); Open reduction and internal fixation (ORIF) of injury of hip (Left, 2018); Colonoscopy (N/A, 2018); Esophagogastroduodenoscopy (N/A, 3/13/2019); Esophagogastroduodenoscopy (N/A, 3/14/2019); Peritoneal catheter removal (N/A, 3/14/2019); Insertion of tunneled central venous hemodialysis catheter (N/A, 3/18/2019); Phlebography (Right, 3/20/2019); Placement of arteriovenous graft (Right, 3/22/2019); Insertion of tunneled central venous hemodialysis catheter (N/A, 3/28/2019); Left heart catheterization (Left, 4/15/2019); Insertion of intra-aortic balloon assist device (4/15/2019); and Coronary angiography (N/A, 4/15/2019).    Social History: Patient reports that she has never smoked. She has never used smokeless tobacco. She reports that she does not drink alcohol or use drugs.    Family History: family history includes Cancer in her maternal grandmother and paternal grandfather; Diabetes  in her maternal aunt and paternal aunt.    Allergies: Patient is allergic to clindamycin; flagyl [metronidazole hcl]; and metronidazole.    ROS  Constitutional: Negative for fever and malaise/fatigue.   Eyes: Negative for blurred vision, double vision and discharge.   Respiratory: + intermittent nonproductive for cough. Neg for shortness of breath and wheezing.    Cardiovascular: Negative for chest pain, palpitations, claudication, leg swelling and PND.   Gastrointestinal: Negative for abdominal pain, constipation, diarrhea, nausea and vomiting. + increased abdominal girth  Genitourinary:  Pt is Anuric  Musculoskeletal:  + generalized weakness. Negative for back pain and myalgias.   Skin: Negative for itching and rash.   Neurological: Negative for dizziness, speech change, seizures, and headaches.   Psychiatric/Behavioral: Negative for depression. The patient is not nervous/anxious.      PEx  Temp:  [96.8 °F (36 °C)-98.9 °F (37.2 °C)]   Pulse:  []   Resp:  [16-20]   BP: ()/(62-81)   SpO2:  [99 %-100 %]   Body mass index is 32.42 kg/m².      Constitutional: Patient appears well-developed and in no distress.   HENT:   Head: Normocephalic and atraumatic.   Eyes: Pupils are equal, round, and reactive to light.   Neck: Normal range of motion. Neck supple.   Cardiovascular: Normal rate, regular rhythm and normal heart sounds.    Pulmonary/Chest: Effort normal and breath sounds are clear  Abdominal: Soft. Bowel sounds are normal. + Distention .  Musculoskeletal: Normal range of motion. + generalized weakness  Neurological: Alert and oriented to person, place, and time.   Skin: Skin is warm and dry. + Lt femoral HD cath present, intact without any signs of infection, dressing changed today.  Abdominal wounds from previous PD catheter, Dressing CDI, no signs of infection.   Psychiatric: Normal mood and affect. Behavior is normal.      Assessment and Plan:  ESRD (end stage renal disease) on dialysis  peritonitis  due to peritoneal dialysis catheter  Leukocytosis  -Previously PD patient; admitted 2/21 at [facility] for peritonitis, had PD catheter removed for recurrent peritonitis & transitioned to HD. Abx complete for peritonitis  Bilateral IJ tunneled catheters were attempted without success due to thrombus. Now has a temporary dialysis catheter in her left fem that was placed 3/28.  Prior to permacath placement, she had a next day graft placed by Dr. Leger on 3/22, but it was never used and was clotted 1 day post-op. Patient refused to have the graft declotted, so the permacatch was placed at that time.  -4/25 HD TTS, HD today  -4/30 abd distention is greatly improved with HD treatments.  -5/2  WBC at 12.89, continue to monitor. Bun/Cr at 25/5.8, continue dialysis TTS.  - 5/6 WBC increased to  14.  Patient has been afebrile and does not appear to look ill.  She is having regular bowel movements.  She does have a slight nonproductive intermittent cough.  She is anuric. pts fem line did not have proper dressing. Charge nurse replaced dressing with proper central line dressing. Will repeat CBC tomorrow, 5/7   - Patient went to general surgery appointment last Friday on 05/03.  She will return to their clinic in 2 weeks to see about a PD replacement catheter.  She will need cardiac clearance and for her abdomen wounds to heal.   -5/7 Bun/cr 26/6.1, TTS for dialysis. Wbc currently at 15.3, but remains afebrile, continue to trend.  -5/9 creatinine is 3.6, Continue HD on Tuesday, Thursday, Saturdays. Patient's WBCs are done to 13.52 today, and remains afebrile, will continue to trend and adding a procalcitonin to Mondays labs.   -5/10 Patient requesting to discuss her concerns about her discharge plan. Patient updated on continued treatment plan and medication adjustments. Additionally, patient concerned about her abd wounds and requesting wound care take a look at it before she leaves. Wound care eval initiated    Pressure injury  of sacral region, stage 2  Abdominal wounds  - Wound care consulted  - continue care for abdominal wounds- cleanse with normal saline and gauze pack wound with Aquacel Ag and secure with foam border to change on Monday Wednesday and Friday or as needed if soiled  - continue wound care for pressure injury of sacral area- nursing to cleanse buttocks perineal area b.i.d. and as needed with wipes apply a thin layer of triad paste b.i.d. and as needed.  No diapers.  Turn q.2 hours.   -5/10  Patient concerned about her abd wounds and requesting wound care take a look at it before she leaves. Wound care eval initiated    CONTINUED    Anemia in chronic kidney disease, on chronic dialysis  - Continue Epo per neph and protonix  - 5/2 H&H at 7.6/25.3, continue Epo  - 5/6 H&H improved to 7.8/25   - 5/7 H&H at 8.2/27.4    Hyperkalemia  - 5/6 K at 5.3.  Patient states she just had a large bowel movement will hold off on treatment for now.  Repeating BMP tomorrow, 5/7  - 5/7 K at 5.4, TTS for dialysis    Hypotension  -4/27 discontinued Coreg and Irbesartan for systolic blood pressures in the 80s  -4/29 Increased midodrine to 10 mg bid  -4/30 Hypotension is improved today, currently at 108/71, s/p increasing midodrine dosing yesterday.   - 5/6 stable- patient's 24 hr blood pressure range is 104/68 to 122/80    Hypomagnesemia  -4/29 initiated mag oxide tab 400 mg daily  -5/2 increased mag oxide to 400 mg bid  - 5/6 improved to 1.6 continue Mag-Ox    Hemorrhoids  -5/2 initiated phenyleph-shark radha oil-mo-pet ointment 1 applicator to be applied to rectum b.i.d.  - 5/6 stable, continues to use medication    high risk for imbalanced nutrition  -5/1 Continued 2000 diabetic diet. Increased fluid restriction to 1500 ml/day. Discontinued renal restriction due to food preference and the need for increase nutrional intake. Goal intake is 85% Of EEN    Debility  - Continue with PT/OT for gait training and strengthening and restoration of ADL's    - Encourage mobility, OOB in chair, and early ambulation as appropriate  - Fall precautions   - Monitor for bowel and bladder dysfunction  - Monitor for and prevent skin breakdown and pressure ulcers  - Continue DVT prophylaxis with heparin   -4/30 Patient is reporting that she does not want to take heparin due to her haveing an acute anemic episode requiring transition to ED in the past. Patient educated on the importance of dvt prophylaxis with decrease mobility, she has decided to continue to refuse heparin.     NSTEMI (non-ST elevated myocardial infarction)  Acute systolic heart failure  -s/p ACS protocol (DAPT, statin, heparin drip stopped)/NSTEMI   -CT surgery/CABG eval revealed, not a candidate due to comorbidities.   -HTS consult as outpatient to discuss advanced options  -LV EF of 25%  -4/25 Continue HD as outpatient for volume removal  -Continue Asa, statin, and plavix daily  -Discontinued Coreg   -Discontinue irbesartan  -F/u with cards, Ndandu    DM II with neuropathy   - Continue Gabapentin 300 mg bid    Future Appointments   Date Time Provider Department Center   5/16/2019  4:00 PM Sung Alonso MD [facility] KRYSTLE Monterroso NP

## 2019-05-12 NOTE — PROGRESS NOTES
[facility]                                  Skilled Nursing Facility                   Progress Note     Admit Date: 4/22/2019  JESSY 5/13/2019  Principal Problem:  Problem with dialysis access   HPI obtained from patient interview and chart review     Chief Complaint: complaint of cold like symptoms. Cough with clear mucus that started yesterday. Denies seasonal allergies. No sick contacts recently.     HPI: Mrs. Todd is 48 year old female with PMHx of of coronary artery disease, chronic systolic and diastolic heart failure (EF 25%), and ESRF on peritoneal dialysis since 2004 presented to [facility] on 2/21 with abdominal pain. She was initiated on treatment for peritonitis and had removal of peritoneal catheter. She developed active GI bleed and associated encephalopathy. Patient had emergent EGD 3/13 that showed erosive esophagitis with small ulcer and clotted blood in the gastric fundus. The following day she had repeat EGD that found one non-bleeding cratered gastric ulcer with clean ulcer base. She was then transfer to [facility] ICU for vascular surgery assistance in dialysis access placement. She had RUE graft 3/22 placed, but it clotted. She has poor vascular options and vascular surgery recommended that she continue with peritoneal access if possible. She is currently being dialyzed with permacath in left groin placed 3/28. She developed NSTEMI day after groin catheter placement. Cardiology recommended goal directed medical therapy. Patient was transferred here to [facility] on 4/4 for debility. She returned to [facility] ER on 4/6 after her dialysis access did not work. TPA was instilled in her permacath and she was able to undergo a full dialysis session.     Interval Hx: afebrile, initiate mucinex and zyrtec for cough and congestion. Discharge planning for tomorrow. Patient advised if symptoms do not improve or worsen to follow  up with her PCP.     Past Medical History: Patient has a past medical history of Abnormal finding on Pap smear, HPV DNA positive (), Anemia associated with chronic renal failure, Blood type B+, Bulging discs - symptomatic , CAD (coronary artery disease), Cardiomyopathy (3/13/2019), Diabetes mellitus, type 2, ESRD (end stage renal disease) (2004), FSGS (focal segmental glomerulosclerosis), Hyperlipidemia, Hypertension (), Neuropathy, NSTEMI (non-ST elevated myocardial infarction) (3/29/2019), Obesity, Secondary hyperparathyroidism, renal, TIA (transient ischemic attack), and Uterine fibroid.    Past Surgical History: Patient has a past surgical history that includes Peritoneal catheter insertion; Umbilical hernia repair;  section, classic; Dialysis fistula creation; Cardiac catheterization; Incision and drainage of wound (Left, ); Open reduction and internal fixation (ORIF) of injury of hip (Left, 2018); Colonoscopy (N/A, 2018); Esophagogastroduodenoscopy (N/A, 3/13/2019); Esophagogastroduodenoscopy (N/A, 3/14/2019); Peritoneal catheter removal (N/A, 3/14/2019); Insertion of tunneled central venous hemodialysis catheter (N/A, 3/18/2019); Phlebography (Right, 3/20/2019); Placement of arteriovenous graft (Right, 3/22/2019); Insertion of tunneled central venous hemodialysis catheter (N/A, 3/28/2019); Left heart catheterization (Left, 4/15/2019); Insertion of intra-aortic balloon assist device (4/15/2019); and Coronary angiography (N/A, 4/15/2019).    Social History: Patient reports that she has never smoked. She has never used smokeless tobacco. She reports that she does not drink alcohol or use drugs.    Family History: family history includes Cancer in her maternal grandmother and paternal grandfather; Diabetes in her maternal aunt and paternal aunt.    Allergies: Patient is allergic to clindamycin; flagyl [metronidazole hcl]; and metronidazole.    ROS  Constitutional: Negative for  fever and malaise/fatigue.   Eyes: Negative for blurred vision, double vision and discharge.   Respiratory: + intermittent nonproductive for cough. Neg for shortness of breath and wheezing.    Cardiovascular: Negative for chest pain, palpitations, claudication, leg swelling and PND.   Gastrointestinal: Negative for abdominal pain, constipation, diarrhea, nausea and vomiting. + increased abdominal girth  Genitourinary:  Pt is Anuric  Musculoskeletal:  + generalized weakness. Negative for back pain and myalgias.   Skin: Negative for itching and rash.   Neurological: Negative for dizziness, speech change, seizures, and headaches.   Psychiatric/Behavioral: Negative for depression. The patient is not nervous/anxious.      PEx  Temp:  [98.1 °F (36.7 °C)-98.8 °F (37.1 °C)]   Pulse:  []   Resp:  [18]   BP: ()/(58-67)   SpO2:  [98 %-100 %]   Body mass index is 32.25 kg/m².      Constitutional: Patient appears well-developed and in no distress.   HENT:   Head: Normocephalic and atraumatic.   Eyes: Pupils are equal, round, and reactive to light.   Neck: Normal range of motion. Neck supple.   Cardiovascular: Normal rate, regular rhythm and normal heart sounds.    Pulmonary/Chest: Effort normal and breath sounds are clear, nasal congestion  Abdominal: Soft. Bowel sounds are normal.  Musculoskeletal: Normal range of motion. + generalized weakness  Neurological: Alert and oriented to person, place, and time.   Skin: Skin is warm and dry. + Lt femoral HD cath present, intact without any signs of infection, dressing changed today.  Abdominal wounds from previous PD catheter. See details below.   Psychiatric: Normal mood and affect. Behavior is normal.                 Incision/Site 03/19/19 Right Abdomen   Date First Assessed: 03/19/19   Side: Right  Location: Abdomen   Wound Image    Incision WDL ex   Dressing Appearance Intact   Drainage Amount Small   Drainage Characteristics/Odor Serosanguineous   Appearance Pink    Red (%), Wound Tissue Color 90 %   Yellow (%), Wound Tissue Color 10 %   Periwound Area Scar tissue   Wound Edges Open   Wound Length (cm) 0.5 cm   Wound Width (cm) 0.5 cm   Wound Depth (cm) 0.3 cm   Wound Volume (cm^3) 0.08 cm^3   Wound Surface Area (cm^2) 0.25 cm^2   Care Cleansed with:;Wound cleanser   Dressing Removed;Applied;Changed;Hydrofiber;Silver;Foam   Dressing Change Due 05/01/19        Incision/Site 03/19/19 Abdomen lower   Date First Assessed: 03/19/19   Location: Abdomen  Orientation: lower   Wound Image    Incision WDL ex   Dressing Appearance Dry   Drainage Amount Moderate   Drainage Characteristics/Odor Serosanguineous   Appearance Pink;Smooth   Red (%), Wound Tissue Color 70 %   Yellow (%), Wound Tissue Color 30 %   Periwound Area Scar tissue   Wound Edges Open   Wound Length (cm) 0.6 cm   Wound Width (cm) 2.2 cm   Wound Depth (cm) 3.8 cm   Wound Volume (cm^3) 5.02 cm^3   Wound Surface Area (cm^2) 1.32 cm^2   Care Cleansed with:;Sterile normal saline   Dressing Removed;Applied;Changed;Silver;Hydrofiber;Foam   Dressing Change Due 05/01/19        Incision/Site 03/14/19 1445 Left Abdomen   Date First Assessed/Time First Assessed: 03/14/19 1445   Side: Left  Location: Abdomen   Wound Image    Incision WDL ex   Dressing Appearance Intact   Drainage Amount Small   Drainage Characteristics/Odor Serosanguineous   Appearance Pink;Fibrin   Red (%), Wound Tissue Color 80 %   Yellow (%), Wound Tissue Color 20 %   Periwound Area Scar tissue   Wound Edges Open   Wound Length (cm) 0.6 cm   Wound Width (cm) 1.8 cm   Wound Depth (cm) 1 cm   Wound Volume (cm^3) 1.08 cm^3   Wound Surface Area (cm^2) 1.08 cm^2   Care Cleansed with:;Sterile normal saline   Dressing Removed;Applied;Changed;Hydrofiber;Silver;Foam   Dressing Change Due 04/29/19        Assessment and Plan:    ESRD (end stage renal disease) on dialysis  peritonitis due to peritoneal dialysis catheter  Leukocytosis  -Previously PD patient; admitted 2/21 at  [facility] for peritonitis, had PD catheter removed for recurrent peritonitis & transitioned to HD. Abx complete for peritonitis  Bilateral IJ tunneled catheters were attempted without success due to thrombus. Now has a temporary dialysis catheter in her left fem that was placed 3/28.  Prior to permacath placement, she had a next day graft placed by Dr. Leger on 3/22, but it was never used and was clotted 1 day post-op. Patient refused to have the graft declotted, so the permacatch was placed at that time.  -4/25 HD TTS, HD today  -4/30 abd distention is greatly improved with HD treatments.  -5/2  WBC at 12.89, continue to monitor. Bun/Cr at 25/5.8, continue dialysis TTS.  - 5/6 WBC increased to  14.  Patient has been afebrile and does not appear to look ill.  She is having regular bowel movements.  She does have a slight nonproductive intermittent cough.  She is anuric. pts fem line did not have proper dressing. Charge nurse replaced dressing with proper central line dressing. Will repeat CBC tomorrow, 5/7   - Patient went to general surgery appointment last Friday on 05/03.  She will return to their clinic in 2 weeks to see about a PD replacement catheter.  She will need cardiac clearance and for her abdomen wounds to heal.     Anemia in chronic kidney disease, on chronic dialysis  -Continue Epo per neph and protonix  -5/2 H&H at 7.6/25.3, continue Epo  - 5/6 H&H improved to 7.8/25     Hyperkalemia  - 5/6 K at 5.3.  Patient states she just had a large bowel movement will hold off on treatment for now.  Repeating BMP tomorrow, 5/7    Hypotension  -4/27 discontinued Coreg and Irbesartan for systolic blood pressures in the 80s  -4/29 Increased midodrine to 10 mg bid  -4/30 Hypotension is improved today, currently at 108/71, s/p increasing midodrine dosing yesterday.   - 5/6 stable- patient's 24 hr blood pressure range is 104/68 to 122/80    Hypomagnesemia  -4/29 initiated mag oxide tab 400 mg daily  -5/2 increased mag  oxide to 400 mg bid  - 5/6 improved to 1.6 continue Mag-Ox    Hemorrhoids  -5/2 initiated phenyleph-shark radha oil-mo-pet ointment 1 applicator to be applied to rectum b.i.d.  - 5/6 stable, continues to use medication    CONTINUE    high risk for imbalanced nutrition  -5/1 Continued 2000 diabetic diet. Increased fluid restriction to 1500 ml/day. Discontinued renal restriction due to food preference and the need for increase nutrional intake. Goal intake is 85% Of EEN    Debility  - Continue with PT/OT for gait training and strengthening and restoration of ADL's   - Encourage mobility, OOB in chair, and early ambulation as appropriate  - Fall precautions   - Monitor for bowel and bladder dysfunction  - Monitor for and prevent skin breakdown and pressure ulcers  - Continue DVT prophylaxis with heparin   -4/30 Patient is reporting that she does not want to take heparin due to her haveing an acute anemic episode requiring transition to ED in the past. Patient educated on the importance of dvt prophylaxis with decrease mobility, she has decided to continue to refuse heparin.     NSTEMI (non-ST elevated myocardial infarction)  Acute systolic heart failure  -s/p ACS protocol (DAPT, statin, heparin drip stopped)/NSTEMI   -CT surgery/CABG eval revealed, not a candidate due to comorbidities.   -HTS consult as outpatient to discuss advanced options  -LV EF of 25%  -4/25 Continue HD as outpatient for volume removal  -Continue Asa, statin, and plavix daily  -Discontinued Coreg   -Discontinue irbesartan  -F/u with cards, Ndandu    Pressure injury of sacral region, stage 2  Abdominal wounds  -Wound care consulted  - continue care for abdominal wounds- cleanse with normal saline and gauze pack wound with Aquacel Ag and secure with foam border to change on Monday Wednesday and Friday or as needed if soiled  - continue wound care for pressure injury of sacral area- nursing to cleanse buttocks perineal area b.i.d. and as needed with  wipes apply a thin layer of triad paste b.i.d. and as needed.  No diapers.  Turn q.2 hours.       DM II with neuropathy   - Continue Gabapentin 300 mg bid    Upper respiratory viral infection, NEW  Rhinitis, NEW  Zyrtec, mucinex    Future Appointments   Date Time Provider Department Center   5/16/2019  4:00 PM Sung Alonso MD [facility] Manfred Kennedy NP

## 2019-05-12 NOTE — PT/OT/SLP PROGRESS
Occupational Therapy  Treatment    Jenni Todd   MRN: [id number]   Admitting Diagnosis: Problem with dialysis access    OT Date of Treatment: 05/12/19       Billable Minutes:  Therapeutic Exercise 28    General Precautions: Standard, fall, diabetic(cardiac)  Orthopedic Precautions: N/A  Braces: N/A    Spiritual, Cultural Beliefs, Nondenominational Practices, Values that Affect Care: no    Subjective:  Communicated with nurse prior to session.  Pt. Reported she is catching a cold.     Pain/Comfort  Pain Rating 1: 0/10  Pain Rating Post-Intervention 1: 0/10    Objective:  Patient found with: (seated in w/c at sink)    Occupational Performance:    Bed Mobility:    · Not tested     Functional Mobility/Transfers:  · Not tested as pt. Up in chair  ·     Activities of Daily Living:  · Grooming: modified independence seated at sink    [facility] 6 Click:  [facility] Total Score: 21    OT Exercises: AROM with 1 # dowel x 2 sets 10 reps for all major planes of bUE motion with rest breaks provided as needed.   UE Ergometer x 10 minutes with min resistance    Additional Treatment:  Pt. Educated on safety with mobility    Patient left up in chair with call button in reach and daughter present    ASSESSMENT:  Jenni Todd is a 48 y.o. female with a medical diagnosis of Problem with dialysis access and presents with mild limitations in higher level ADL task performance.  Pt. Tolerated session well on this date.     Rehab identified problem list/impairments: impaired endurance, impaired self care skills, impaired functional mobilty, impaired cardiopulmonary response to activity    Rehab potential is good    Activity tolerance: Good    Discharge recommendations: home health OT(may require S initially)     Barriers to discharge: Decreased caregiver support     Equipment recommendations: commode     GOALS:   Multidisciplinary Problems     Occupational Therapy Goals        Problem: Occupational Therapy Goal    Goal Priority Disciplines Outcome  Interventions   Occupational Therapy Goal     OT, PT/OT Ongoing (interventions implemented as appropriate)    Description:  Goals to be met by: 05-06-19 Goals extended to 05-13-19    Patient will increase functional independence with ADLs by performing:    UE Dressing with Mod I  MET  LE Dressing with Mod I.  Grooming while seated with Mod I .  MET  Toileting from bedside commode with Mod I for hygiene and clothing management.   Bathing from  edge of bed with SBA.  Supine to sit with Modified Yoder.  Stand pivot transfers with Mod I .  Toilet transfer to bedside commode with Mod I.  Pt. To be I with HEP for BUE to improve endurance level                        Plan:  Patient to be seen 5 x/week to address the above listed problems via self-care/home management, therapeutic activities, therapeutic exercises  Plan of Care expires: 05/23/19  Plan of Care reviewed with: patient    SABRA Mcguire  05/12/2019

## 2019-05-13 PROBLEM — J31.0 RHINITIS: Status: ACTIVE | Noted: 2019-01-01

## 2019-05-13 PROBLEM — J06.9 UPPER RESPIRATORY INFECTION: Status: ACTIVE | Noted: 2019-01-01

## 2019-05-13 NOTE — PLAN OF CARE
Problem: Fall Injury Risk  Goal: Absence of Fall and Fall-Related Injury  Outcome: Ongoing (interventions implemented as appropriate)  Pt had no falls this shift.will continue to monitor.

## 2019-05-13 NOTE — NURSING
Wound care follow up.      Wound to the right abdomen is healed. Area cleansed and left open to air.        Wound to mid upper abdomen is healing into a dip. There is an open area at base approx 0.5 x0.5 x 1.5 cm    Wound to lower abdomen is smaller. Wound approx 0.6 x 2 x 3.0  Wound cleansed and packed with aquacel ag.       Recommend:  Follow up with outpatient wound care   3 x week for dressing change, aquacel ag packing to wounds.     Patient to discharge today. Discussed POC with patient. Patient verbalized agreement.     Cammy Ferrara RN [facility]   [phone]

## 2019-05-13 NOTE — PROGRESS NOTES
[facility]                                  Skilled Nursing Facility                   Progress Note     Admit Date: 4/22/2019  JESSY 5/17/2019  Principal Problem:  Problem with dialysis access   HPI obtained from patient interview and chart review     Chief Complaint: Revaluation of medical treatment and therapy status: Lab review    HPI: Mrs. Todd is 48 year old female with PMHx of of coronary artery disease, chronic systolic and diastolic heart failure (EF 25%), and ESRF on peritoneal dialysis since 2004 presented to [facility] on 2/21 with abdominal pain. She was initiated on treatment for peritonitis and had removal of peritoneal catheter. She developed active GI bleed and associated encephalopathy. Patient had emergent EGD 3/13 that showed erosive esophagitis with small ulcer and clotted blood in the gastric fundus. The following day she had repeat EGD that found one non-bleeding cratered gastric ulcer with clean ulcer base. She was then transfer to [facility] ICU for vascular surgery assistance in dialysis access placement. She had RUE graft 3/22 placed, but it clotted. She has poor vascular options and vascular surgery recommended that she continue with peritoneal access if possible. She is currently being dialyzed with permacath in left groin placed 3/28. She developed NSTEMI day after groin catheter placement. Cardiology recommended goal directed medical therapy. Patient was transferred here to [facility] on 4/4 for debility. She returned to [facility] ER on 4/6 after her dialysis access did not work. TPA was instilled in her permacath and she was able to undergo a full dialysis session.     Interval Hx: During f/u of care, patient continued to be treated at [facility] with PT and OT to improve functional status and ability to perform ADLs. Today, labs reviewed revealed procalcitonin positive at 1.18.  WBC spike to 20.20 and mild  increase in temperature to 99.1, Discharge postponed and infectious workup initiated. Blood cx x 2, Bladder scan, and straight cath for UA with reflex initiated.  Patient reporting new cough over the weekend.  Chest x-ray ordered. H&H decreased to .9. CR at 4.8, continue HD TTS. Mag at 1.6, replacement ordered.    Past Medical History: Patient has a past medical history of Abnormal finding on Pap smear, HPV DNA positive (), Anemia associated with chronic renal failure, Blood type B+, Bulging discs - symptomatic , CAD (coronary artery disease), Cardiomyopathy (3/13/2019), Diabetes mellitus, type 2, ESRD (end stage renal disease) (2004), FSGS (focal segmental glomerulosclerosis), Hyperlipidemia, Hypertension (), Neuropathy, NSTEMI (non-ST elevated myocardial infarction) (3/29/2019), Obesity, Secondary hyperparathyroidism, renal, TIA (transient ischemic attack), and Uterine fibroid.    Past Surgical History: Patient has a past surgical history that includes Peritoneal catheter insertion; Umbilical hernia repair;  section, classic; Dialysis fistula creation; Cardiac catheterization; Incision and drainage of wound (Left, ); Open reduction and internal fixation (ORIF) of injury of hip (Left, 2018); Colonoscopy (N/A, 2018); Esophagogastroduodenoscopy (N/A, 3/13/2019); Esophagogastroduodenoscopy (N/A, 3/14/2019); Peritoneal catheter removal (N/A, 3/14/2019); Insertion of tunneled central venous hemodialysis catheter (N/A, 3/18/2019); Phlebography (Right, 3/20/2019); Placement of arteriovenous graft (Right, 3/22/2019); Insertion of tunneled central venous hemodialysis catheter (N/A, 3/28/2019); Left heart catheterization (Left, 4/15/2019); Insertion of intra-aortic balloon assist device (4/15/2019); and Coronary angiography (N/A, 4/15/2019).    Social History: Patient reports that she has never smoked. She has never used smokeless tobacco. She reports that she does not drink alcohol or  use drugs.    Family History: family history includes Cancer in her maternal grandmother and paternal grandfather; Diabetes in her maternal aunt and paternal aunt.    Allergies: Patient is allergic to clindamycin; flagyl [metronidazole hcl]; and metronidazole.    ROS  Constitutional: Negative for fever and malaise/fatigue.   Eyes: Negative for blurred vision, double vision and discharge.   Respiratory: + intermittent nonproductive for cough. Neg for shortness of breath and wheezing.    Cardiovascular: Negative for chest pain, palpitations, claudication, leg swelling and PND.   Gastrointestinal: Negative for abdominal pain, constipation, diarrhea, nausea and vomiting. + increased abdominal girth  Genitourinary:  Pt is Anuric  Musculoskeletal:  + generalized weakness. Negative for back pain and myalgias.   Skin: Negative for itching and rash.   Neurological: Negative for dizziness, speech change, seizures, and headaches.   Psychiatric/Behavioral: Negative for depression. The patient is not nervous/anxious.      PEx  Temp:  [98.1 °F (36.7 °C)-99.1 °F (37.3 °C)]   Pulse:  [107-110]   Resp:  [18]   BP: (103-106)/(58-68)   SpO2:  [99 %]   Body mass index is 32.98 kg/m².      Constitutional: Patient appears well-developed and in no distress.   HENT:   Head: Normocephalic and atraumatic.   Eyes: Pupils are equal, round, and reactive to light.   Neck: Normal range of motion. Neck supple.   Cardiovascular: Normal rate, regular rhythm and normal heart sounds.    Pulmonary/Chest: Effort normal and breath sounds are clear, nasal congestion  Abdominal: Soft. Bowel sounds are normal.  Musculoskeletal: Normal range of motion. + generalized weakness  Neurological: Alert and oriented to person, place, and time.   Skin: Skin is warm and dry. + Lt femoral HD cath present, intact without any signs of infection, dressing changed today.  Abdominal wounds from previous PD catheter. See details below.   Psychiatric: Normal mood and affect.  Behavior is normal.     Wound eval:  Wound to the right abdomen is healed. Area cleansed and left open to air.     Wound to mid upper abdomen is healing into a dip. There is an open area at base approx 0.5 x0.5 x 1.5 cm   Wound to lower abdomen is smaller. Wound approx 0.6 x 2 x 3.0 Wound cleansed and packed with aquacel ag.     Recommend:  Follow up with outpatient wound care   On discharge:  3 x week for dressing change, aquacel ag packing to wounds.     Assessment and Plan:  Upper respiratory infection  Rhinitis  Cough  - 5/13 Continue Zyrtec, mucinex  - 5/13 chest x-ray initiated    ESRD (end stage renal disease) on dialysis  peritonitis due to peritoneal dialysis catheter  Leukocytosis  -Previously PD patient; admitted 2/21 at [facility] for peritonitis, had PD catheter removed for recurrent peritonitis & transitioned to HD. Abx complete for peritonitis  Bilateral IJ tunneled catheters were attempted without success due to thrombus. Now has a temporary dialysis catheter in her left fem that was placed 3/28.  Prior to permacath placement, she had a next day graft placed by Dr. Leger on 3/22, but it was never used and was clotted 1 day post-op. Patient refused to have the graft declotted, so the permacatch was placed at that time.  -4/25 HD TTS, HD today  -4/30 abd distention is greatly improved with HD treatments.  -5/2  WBC at 12.89, continue to monitor. Bun/Cr at 25/5.8, continue dialysis TTS.  - 5/6 WBC increased to  14.  Patient has been afebrile and does not appear to look ill.  She is having regular bowel movements.  She does have a slight nonproductive intermittent cough.  She is anuric. pts fem line did not have proper dressing. Charge nurse replaced dressing with proper central line dressing. Will repeat CBC tomorrow, 5/7   - Patient went to general surgery appointment last Friday on 05/03.  She will return to their clinic in 2 weeks to see about a PD replacement catheter.  She will need cardiac  clearance and for her abdomen wounds to heal.   - 5/13 WBC spike to 20.20 and mild increase in temperature to 99.1, Procalcitonin positive at 1.18. Discharge postponed and infectious workup initiated. Blood cx x 2, Bladder scan, and straight cath for UA with reflex initiated.   - 5/13 CR at 4.8, continue HD TTS.     Anemia in chronic kidney disease, on chronic dialysis  -Continue Epo per neph and protonix  -5/2 H&H at 7.6/25.3, continue Epo  - 5/6 H&H improved to 7.8/25   - 5/13 H&H decreased to 7/22.9.     Hypomagnesemia  -4/29 initiated mag oxide tab 400 mg daily  -5/2 increased mag oxide to 400 mg bid  - 5/6 improved to 1.6 continue Mag-Ox  - 5/13 increased magnesium tablet 400 mg t.i.d.    CONTINUE    Hemorrhoids  -5/2 initiated phenyleph-shark radha oil-mo-pet ointment 1 applicator to be applied to rectum b.i.d.  - 5/6 stable, continues to use medication    high risk for imbalanced nutrition  -5/1 Continued 2000 diabetic diet. Increased fluid restriction to 1500 ml/day. Discontinued renal restriction due to food preference and the need for increase nutrional intake. Goal intake is 85% Of EEN    Debility  - Continue with PT/OT for gait training and strengthening and restoration of ADL's   - Encourage mobility, OOB in chair, and early ambulation as appropriate  - Fall precautions   - Monitor for bowel and bladder dysfunction  - Monitor for and prevent skin breakdown and pressure ulcers  - Continue DVT prophylaxis with heparin   -4/30 Patient is reporting that she does not want to take heparin due to her haveing an acute anemic episode requiring transition to ED in the past. Patient educated on the importance of dvt prophylaxis with decrease mobility, she has decided to continue to refuse heparin.     NSTEMI (non-ST elevated myocardial infarction)  Acute systolic heart failure  -s/p ACS protocol (DAPT, statin, heparin drip stopped)/NSTEMI   -CT surgery/CABG eval revealed, not a candidate due to comorbidities.   -HTS  consult as outpatient to discuss advanced options  -LV EF of 25%  -4/25 Continue HD as outpatient for volume removal  -Continue Asa, statin, and plavix daily  -Discontinued Coreg   -Discontinue irbesartan  -F/u with cards, Ndandu    Pressure injury of sacral region, stage 2  Abdominal wounds  -Wound care consulted  - continue care for abdominal wounds- cleanse with normal saline and gauze pack wound with Aquacel Ag and secure with foam border to change on Monday Wednesday and Friday or as needed if soiled  - continue wound care for pressure injury of sacral area- nursing to cleanse buttocks perineal area b.i.d. and as needed with wipes apply a thin layer of triad paste b.i.d. and as needed.  No diapers.  Turn q.2 hours.       DM II with neuropathy   - Continue Gabapentin 300 mg bid    Future Appointments   Date Time Provider Department Center   5/16/2019  4:00 PM Sung Alonso MD [facility]   65minutes spent in the care of the patient (Greater than 1/2 spent in non direct face-to-face contact)   36 of 65 minutes spent on documentation and counseling patient on clinical condition and therapies provided regarding New Cough, Upper respiratory infection, Infectious workup, and coordination of care regarding holding discharge. The remainder of the time was spent in direct patient care.       Cleveland Monterroso NP

## 2019-05-14 NOTE — PLAN OF CARE
Problem: Occupational Therapy Goal  Goal: Occupational Therapy Goal  Goals to be met by: 05-06-19 Goals extended to 05-13-19    Patient will increase functional independence with ADLs by performing:    UE Dressing with Mod I  MET  LE Dressing with Mod I.  REVISED LE dressing with SBA.  Grooming while seated with Mod I .  MET  Toileting from bedside commode with Mod I for hygiene and clothing management. REVISED  SBA with toileting with A/D to stand.  Bathing from  edge of bed with SBA.  Supine to sit with Modified [place].  Stand pivot transfers with Mod I .  Toilet transfer to bedside commode with Mod I.  Pt. To be I with HEP for BUE to improve endurance level       Outcome: Ongoing (interventions implemented as appropriate)  Rashaun Johnson, OTR/L      5/14/2019

## 2019-05-14 NOTE — PT/OT/SLP PROGRESS
"Physical Therapy  Treatment    Jenni Todd   MRN: [id number]   Admitting Diagnosis: Problem with dialysis access    PT Received On: 05/14/19        Billable Minutes:  Therapeutic Activity 15 and Total Time 15    Treatment Type: Treatment  PT/PTA: PT     PTA Visit Number: 0       General Precautions: Standard, diabetic, fall  Orthopedic Precautions: N/A   Braces: N/A    Spiritual, Cultural Beliefs, Buddhism Practices, Values that Affect Care: no    Subjective:  Communicated with patient prior to session.  "I have a cold." "I just don't feel well." Pt reports she spoke to NP Cleveland about her symptoms.     Pain/Comfort  Pain Rating 1: 0/10    Objective:  Patient found sitting in w/c.       AM-PAC 6 CLICK MOBILITY  Total Score:16    Bed Mobility:  Not performed    Transfers:  Sit<>Stand: to/from w/c w/ rollator and SPV  Pt locks rollator brakes prior to transfer w/o cues    Gait:  Pt declined due to above noted symtpoms     Advanced Gait:  Curb Step: asc/laurie 4" Curb w/ rollator and CGA for safety  Cueing/demo for technique/sequencing    Therex:  Seated BLE therex 2x10 reps (GS, HF, LAQ)    Patient left up in chair with transportation present to bring pt to HD appointment..    Assessment:  Jenni Todd is a 48 y.o. female with a medical diagnosis of Problem with dialysis access.  Pt's session was limited by her not feeling well due to cold. Her session was also limited in duration by HD appointment. She declined gait due to cold. Pt will continue PT POC.    Rehab identified problem list/impairments: weakness, impaired endurance, impaired sensation, impaired self care skills, impaired functional mobilty, gait instability, impaired balance, decreased coordination, decreased lower extremity function, impaired cardiopulmonary response to activity    Rehab potential is fair.    Activity tolerance: Fair    Discharge recommendations: home health PT     Barriers to discharge: Decreased caregiver support    Equipment " recommendations: commode     GOALS:   Multidisciplinary Problems     Physical Therapy Goals        Problem: Physical Therapy Goal    Goal Priority Disciplines Outcome Goal Variances Interventions   Physical Therapy Goal     PT, PT/OT Ongoing (interventions implemented as appropriate)     Description:  Goals to be met by: 19    Patient will increase functional independence with mobility by performin. Supine to sit with Modified [place]  2. Sit to supine with Modified [place]  3. Sit to stand transfer with SBA. Met   4. Bed to chair transfer with SBA using Rolling Walker or Rollator. met  5. Gait  x 30 feet with SBA using Rolling Walker or Rollator. Met   6. Wheelchair propulsion x100 feet with Supervision using bilateral upper extremities. Met   7. Ascend/Descend 4 inch curb step with Stand-by Assistance using Rolling Walker or Rollator (on hold for now due to medical status)  8. Stand for 2 minutes with Stand-by Assistance using Rolling Walker or Rollator   9. Lower extremity exercise program x30 reps per handout, with assistance as needed                            PLAN:    Patient to be seen 5 x/week  to address the above listed problems via gait training, therapeutic activities, therapeutic exercises  Plan of Care expires: 19  Plan of Care reviewed with: patient    Brook Donnelly, PT  2019

## 2019-05-14 NOTE — PT/OT/SLP PROGRESS
Occupational Therapy  Treatment    Jenni Todd   MRN: [id number]   Admitting Diagnosis: Problem with dialysis access    OT Date of Treatment: 05/14/19       Billable Minutes:  Self Care/Home Management 45 and Therapeutic Activity 10    General Precautions: Standard, diabetic, fall  Orthopedic Precautions: N/A  Braces: N/A    Spiritual, Cultural Beliefs, Mu-ism Practices, Values that Affect Care: no    Subjective:  Communicated with nurse prior to session.      Pain/Comfort  Pain Rating 1: 0/10  Pain Rating Post-Intervention 1: 0/10    Objective:  Patient found with: (Dialysis Port)    Occupational Performance:    Bed Mobility:    · Pt seated in W/C at onset of therapy session.    Functional Mobility/Transfers:  · Patient completed Sit <> Stand Transfer with minimum assistance  with  rolling walker   · Patient completed Bed <> Chair Transfer using Stand Pivot technique with minimum assistance with rolling walker  · Patient completed Toilet Transfer Stand Pivot technique with moderate assistance with  rolling walker  · Functional Mobility: Pt ambulated from EOB to toilet with rollator and close SBA.    Activities of Daily Living:  · Grooming: modified independence seated sinkside  · Bathing: minimum assistance with (A) to wash feet.  · Upper Body Dressing: modified independence with no assist required.  · Lower Body Dressing: minimum assistance with (A) to don socks. Pt donning pants, socks brief and slip on shoes.  · Toileting: contact guard assistance when standing to manage clothing over hips.Performed from raise toilet with grab bars to steady.    [facility] 6 Click:  AMPA Total Score: 21      Additional Treatment:  Pt edu on Plan of care,  safety when performing functional transfers, self care tasks and functional standing activities.  - White board updated  - Self care tasks completed-- as noted above   - She performed dynamic sitting activity while sitting unsupported EOB and working on self care  tasks.    Patient left up in chair with call button in reach and nurse notified    ASSESSMENT:  Jenni Todd is a 48 y.o. female with a medical diagnosis of Problem with dialysis access .  Pt was agreeable to OT and tolerated Tx without incidence.  She is making progress but continues to present with deficits affecting (I)ce with functional transfers, functional standing balance and self care tasks with standing component.   OT continues to be recommended to further her functional (I)ce and safety. Goals remain appropriate and continued OT is recommended.    Rehab identified problem list/impairments: impaired endurance, impaired self care skills, impaired functional mobilty, impaired cardiopulmonary response to activity    Rehab potential is good    Activity tolerance: Fair    Discharge recommendations: home health OT(may require S initially)     Barriers to discharge: Decreased caregiver support     Equipment recommendations: commode     GOALS:   Multidisciplinary Problems     Occupational Therapy Goals        Problem: Occupational Therapy Goal    Goal Priority Disciplines Outcome Interventions   Occupational Therapy Goal     OT, PT/OT Ongoing (interventions implemented as appropriate)    Description:  Goals to be met by: 05-06-19 Goals extended to 05-13-19    Patient will increase functional independence with ADLs by performing:    UE Dressing with Mod I  MET  LE Dressing with Mod I.  REVISED LE dressing with SBA.  Grooming while seated with Mod I .  MET  Toileting from bedside commode with Mod I for hygiene and clothing management. REVISED  SBA with toileting with A/D to stand.  Bathing from  edge of bed with SBA.  Supine to sit with Modified [place].  Stand pivot transfers with Mod I .  Toilet transfer to bedside commode with Mod I.  Pt. To be I with HEP for BUE to improve endurance level                         Plan:  Patient to be seen 5 x/week to address the above listed problems via self-care/home  management, therapeutic activities, therapeutic exercises  Plan of Care expires: 05/23/19  Plan of Care reviewed with: patient    Rashaun Johnson, OTR/L  05/14/2019

## 2019-05-14 NOTE — PROGRESS NOTES
[facility]                                  Skilled Nursing Facility                   Progress Note     Admit Date: 4/22/2019  JESSY 5/17/2019  Principal Problem:  Problem with dialysis access   HPI obtained from patient interview and chart review     Chief Complaint: Revaluation of medical treatment and therapy status: F/u CXR    HPI: Mrs. Tdod is 48 year old female with PMHx of of coronary artery disease, chronic systolic and diastolic heart failure (EF 25%), and ESRF on peritoneal dialysis since 2004 presented to [facility] on 2/21 with abdominal pain. She was initiated on treatment for peritonitis and had removal of peritoneal catheter. She developed active GI bleed and associated encephalopathy. Patient had emergent EGD 3/13 that showed erosive esophagitis with small ulcer and clotted blood in the gastric fundus. The following day she had repeat EGD that found one non-bleeding cratered gastric ulcer with clean ulcer base. She was then transfer to [facility] ICU for vascular surgery assistance in dialysis access placement. She had RUE graft 3/22 placed, but it clotted. She has poor vascular options and vascular surgery recommended that she continue with peritoneal access if possible. She is currently being dialyzed with permacath in left groin placed 3/28. She developed NSTEMI day after groin catheter placement. Cardiology recommended goal directed medical therapy. Patient was transferred here to [facility] on 4/4 for debility. She returned to [facility] ER on 4/6 after her dialysis access did not work. TPA was instilled in her permacath and she was able to undergo a full dialysis session.     Interval Hx: During f/u of care, patient continued to be treated at [facility] with PT and OT to improve functional status and ability to perform ADLs. Today, reviewed of CXR revealed no acute cardiopulmonary process.  Findings improved compared  to chest x-ray dated .  T-max 100° F, blood culture obtained from left femoral tunneled catheter. Repeat labs ordered for the a.m.     Past Medical History: Patient has a past medical history of Abnormal finding on Pap smear, HPV DNA positive (), Anemia associated with chronic renal failure, Blood type B+, Bulging discs - symptomatic , CAD (coronary artery disease), Cardiomyopathy (3/13/2019), Diabetes mellitus, type 2, ESRD (end stage renal disease) (2004), FSGS (focal segmental glomerulosclerosis), Hyperlipidemia, Hypertension (), Neuropathy, NSTEMI (non-ST elevated myocardial infarction) (3/29/2019), Obesity, Secondary hyperparathyroidism, renal, TIA (transient ischemic attack), and Uterine fibroid.    Past Surgical History: Patient has a past surgical history that includes Peritoneal catheter insertion; Umbilical hernia repair;  section, classic; Dialysis fistula creation; Cardiac catheterization; Incision and drainage of wound (Left, ); Open reduction and internal fixation (ORIF) of injury of hip (Left, 2018); Colonoscopy (N/A, 2018); Esophagogastroduodenoscopy (N/A, 3/13/2019); Esophagogastroduodenoscopy (N/A, 3/14/2019); Peritoneal catheter removal (N/A, 3/14/2019); Insertion of tunneled central venous hemodialysis catheter (N/A, 3/18/2019); Phlebography (Right, 3/20/2019); Placement of arteriovenous graft (Right, 3/22/2019); Insertion of tunneled central venous hemodialysis catheter (N/A, 3/28/2019); Left heart catheterization (Left, 4/15/2019); Insertion of intra-aortic balloon assist device (4/15/2019); and Coronary angiography (N/A, 4/15/2019).    Social History: Patient reports that she has never smoked. She has never used smokeless tobacco. She reports that she does not drink alcohol or use drugs.    Family History: family history includes Cancer in her maternal grandmother and paternal grandfather; Diabetes in her maternal aunt and paternal aunt.    Allergies:  Patient is allergic to clindamycin; flagyl [metronidazole hcl]; and metronidazole.    ROS  Constitutional: Negative for fever and malaise/fatigue.   Eyes: Negative for blurred vision, double vision and discharge.   Respiratory: + intermittent nonproductive for cough. Neg for shortness of breath and wheezing.    Cardiovascular: Negative for chest pain, palpitations, claudication, leg swelling and PND.   Gastrointestinal: Negative for abdominal pain, constipation, diarrhea, nausea and vomiting. + increased abdominal girth  Genitourinary:  Pt is Anuric  Musculoskeletal:  + generalized weakness. Negative for back pain and myalgias.   Skin: Negative for itching and rash.   Neurological: Negative for dizziness, speech change, seizures, and headaches.   Psychiatric/Behavioral: Negative for depression. The patient is not nervous/anxious.      PEx  Temp:  [98.5 °F (36.9 °C)-100 °F (37.8 °C)]   Pulse:  [108-110]   Resp:  [18]   BP: (104-116)/(68-78)   SpO2:  [100 %]   Body mass index is 33.32 kg/m².      Constitutional: Patient appears well-developed and in no distress.   HENT:   Head: Normocephalic and atraumatic.   Eyes: Pupils are equal, round, and reactive to light.   Neck: Normal range of motion. Neck supple.   Cardiovascular: Normal rate, regular rhythm and normal heart sounds.    Pulmonary/Chest: Effort normal and breath sounds are clear, nasal congestion  Abdominal: Soft. Bowel sounds are normal.  Musculoskeletal: Normal range of motion. + generalized weakness  Neurological: Alert and oriented to person, place, and time.   Skin: Skin is warm and dry. + Lt femoral HD cath present, intact without any signs of infection, dressing changed today.  Abdominal wounds from previous PD catheter. See details below.   Psychiatric: Normal mood and affect. Behavior is normal.     Assessment and Plan:  Upper respiratory infection  Rhinitis  Cough  - 5/13 Continue Zyrtec, mucinex  - 5/13 chest x-ray initiated  - 5/14 Review of CXR  revealed no acute cardiopulmonary process. Findings improved compared to chest x-ray dated 04/18. T-max 100° F, blood culture obtained from left femoral tunneled catheter. Repeat labs ordered for the a.m.     CONTINUED    ESRD (end stage renal disease) on dialysis  peritonitis due to peritoneal dialysis catheter  Leukocytosis  -Previously PD patient; admitted 2/21 at [facility] for peritonitis, had PD catheter removed for recurrent peritonitis & transitioned to HD. Abx complete for peritonitis  Bilateral IJ tunneled catheters were attempted without success due to thrombus. Now has a temporary dialysis catheter in her left fem that was placed 3/28.  Prior to permacath placement, she had a next day graft placed by Dr. Leger on 3/22, but it was never used and was clotted 1 day post-op. Patient refused to have the graft declotted, so the permacatch was placed at that time.  -4/25 HD TTS, HD today  -4/30 abd distention is greatly improved with HD treatments.  -5/2  WBC at 12.89, continue to monitor. Bun/Cr at 25/5.8, continue dialysis TTS.  - 5/6 WBC increased to  14.  Patient has been afebrile and does not appear to look ill.  She is having regular bowel movements.  She does have a slight nonproductive intermittent cough.  She is anuric. pts fem line did not have proper dressing. Charge nurse replaced dressing with proper central line dressing. Will repeat CBC tomorrow, 5/7   - Patient went to general surgery appointment last Friday on 05/03.  She will return to their clinic in 2 weeks to see about a PD replacement catheter.  She will need cardiac clearance and for her abdomen wounds to heal.   - 5/13 WBC spike to 20.20 and mild increase in temperature to 99.1, Procalcitonin positive at 1.18. Discharge postponed and infectious workup initiated. Blood cx x 2, Bladder scan, and straight cath for UA with reflex initiated.   - 5/13 CR at 4.8, continue HD TTS.     Anemia in chronic kidney disease, on chronic  dialysis  -Continue Epo per neph and protonix  -5/2 H&H at 7.6/25.3, continue Epo  - 5/6 H&H improved to 7.8/25   - 5/13 H&H decreased to 7/22.9.     Hypomagnesemia  -4/29 initiated mag oxide tab 400 mg daily  -5/2 increased mag oxide to 400 mg bid  - 5/6 improved to 1.6 continue Mag-Ox  - 5/13 increased magnesium tablet 400 mg t.i.d.    Hemorrhoids  -5/2 initiated phenyleph-shark radha oil-mo-pet ointment 1 applicator to be applied to rectum b.i.d.  - 5/6 stable, continues to use medication    high risk for imbalanced nutrition  -5/1 Continued 2000 diabetic diet. Increased fluid restriction to 1500 ml/day. Discontinued renal restriction due to food preference and the need for increase nutrional intake. Goal intake is 85% Of EEN    Debility  - Continue with PT/OT for gait training and strengthening and restoration of ADL's   - Encourage mobility, OOB in chair, and early ambulation as appropriate  - Fall precautions   - Monitor for bowel and bladder dysfunction  - Monitor for and prevent skin breakdown and pressure ulcers  - Continue DVT prophylaxis with heparin   -4/30 Patient is reporting that she does not want to take heparin due to her haveing an acute anemic episode requiring transition to ED in the past. Patient educated on the importance of dvt prophylaxis with decrease mobility, she has decided to continue to refuse heparin.     NSTEMI (non-ST elevated myocardial infarction)  Acute systolic heart failure  -s/p ACS protocol (DAPT, statin, heparin drip stopped)/NSTEMI   -CT surgery/CABG eval revealed, not a candidate due to comorbidities.   -HTS consult as outpatient to discuss advanced options  -LV EF of 25%  -4/25 Continue HD as outpatient for volume removal  -Continue Asa, statin, and plavix daily  -Discontinued Coreg   -Discontinue irbesartan  -F/u with cards, Ndandu    Pressure injury of sacral region, stage 2  Abdominal wounds  -Wound care consulted  - continue care for abdominal wounds- cleanse with normal  saline and gauze pack wound with Aquacel Ag and secure with foam border to change on Monday Wednesday and Friday or as needed if soiled  - continue wound care for pressure injury of sacral area- nursing to cleanse buttocks perineal area b.i.d. and as needed with wipes apply a thin layer of triad paste b.i.d. and as needed.  No diapers.  Turn q.2 hours.       DM II with neuropathy   - Continue Gabapentin 300 mg bid    Future Appointments   Date Time Provider Department Center   5/16/2019  4:00 PM Sung Alonso MD [facility]     65 minutes spent in the care of the patient (Greater than 1/2 spent in non direct face-to-face contact)   40 of 65 minutes spent on documentation and counseling patient on clinical condition and therapies provided regarding continued infectious work up, personally obtained blood cultures from left fem line, coordination of care with multidisciplinary teams to obtain cultures, manage PT/OT, and transport for dialysis. The remainder of the time was spent in direct patient care.     Cleveland Monterroso NP

## 2019-05-14 NOTE — PROGRESS NOTES
"[facility] - Skilled Nursing Care  Adult Nutrition  Progress Note    SUMMARY       Recommendations    Recommendation: Continue 2000kcal DM diet with 1500ml fluid restriction    Goals: PO to meet 85% of EEN by next RD visit    Nutrition Goal Status: progressing towards goal  Communication of RD Recs: reviewed with physician(POC)    Reason for Assessment    Reason For Assessment: RD follow-up  Diagnosis: (Debility, sp septic shock)  Relevant Medical History: CAD, CHF, DM2, ESRD, HLD, HTN    General Information Comments: Pt was in dialysis today, unable to interview. Noted to still have good PO intake. No N/V/D/C noted, LBM yesterday. No physical s/s of malnutrition noted. Hyperkalemia now improved.   Nutrition Discharge Planning: PO to meet 85% of needs by next RD follow up.     Nutrition Risk Screen    Nutrition Risk Screen: no indicators present    Nutrition/Diet History    Patient Reported Diet/Restrictions/Preferences: diabetic diet  Food Preferences: no fish, no cooked carrots, no mixed vegetables  Spiritual, Cultural Beliefs, Evangelical Practices, Values that Affect Care: no  Supplemental Drinks or Food Habits: (refuses ONS)  Food Allergies: NKFA  Factors Affecting Nutritional Intake: None identified at this time    Anthropometrics    Temp: 98.9 °F (37.2 °C)  Height Method: Stated  Height: 5' 7" (170.2 cm)  Height (inches): 67 in  Weight Method: Standard Scale  Weight: 96.5 kg (212 lb 11.9 oz)  Weight (lb): 212.75 lb  Ideal Body Weight (IBW), Female: 135 lb  % Ideal Body Weight, Female (lb): 157.59 lb  BMI (Calculated): 33.4  BMI Grade: 30 - 34.9- obesity - grade I  Weight Loss: unintentional  Usual Body Weight (UBW), k kg  % Usual Body Weight: 98.83  % Weight Change From Usual Weight: -1.37 %     Lab/Procedures/Meds    Pertinent Labs: Reviewed  Lab Results   Component Value Date    CREATININE 4.8 (H) 2019    CALCIUM 8.5 (L) 2019    ESTGFRAFRICA 11.5 (A) 2019     POCT Glucose   Date Value " Ref Range Status   05/14/2019 181 (H) 70 - 110 mg/dL Final   05/13/2019 210 (H) 70 - 110 mg/dL Final   05/13/2019 194 (H) 70 - 110 mg/dL Final   05/13/2019 232 (H) 70 - 110 mg/dL Final   05/13/2019 159 (H) 70 - 110 mg/dL Final     Lab Results   Component Value Date    HGBA1C 6.1 (H) 04/12/2019     Pertinent Meds: Reviewed  Scheduled Meds:   aspirin  81 mg Oral Daily    atorvastatin  40 mg Oral QHS    calcitRIOL  0.5 mcg Oral Daily    cetirizine  5 mg Oral Daily    cinacalcet  90 mg Oral Daily    clopidogrel  75 mg Oral Daily    gabapentin  300 mg Oral BID    guaiFENesin  600 mg Oral BID    heparin (porcine)  5,000 Units Subcutaneous Q12H    magnesium oxide  400 mg Oral TID    midodrine  10 mg Oral TID WM    pantoprazole  40 mg Oral Daily    phenyleph-shark radha oil-mo-pet  1 applicator Rectal BID    sevelamer carbonate  800 mg Oral TID WM    vitamin renal formula (B-complex-vitamin c-folic acid)  1 capsule Oral Daily     Estimated/Assessed Needs    Weight Used For Calorie Calculations: 100.6 kg (221 lb 12.5 oz)  Energy Calorie Requirements (kcal): 2078  Energy Need Method: [place]-St Jeor(x 1.25 (PAL))  Protein Requirements: 92g(x 1.5 x IBW kg)  Weight Used For Protein Calculations: 61.4 kg (135 lb 5.8 oz)  Fluid Requirements (mL): 1500 ml per MD  RDA Method (mL): 2078  CHO Requirement: 50% of calories    Nutrition Prescription Ordered    Current Diet Order: 2000kcal DM diet  Nutrition Order Comments: 1500ml fluid restrictions  Oral Nutrition Supplement: none(pt refuses)    Evaluation of Received Nutrient/Fluid Intake    % Intake of Estimated Energy Needs: 75 - 100 %  % Meal Intake: 75 - 100 %    Nutrition Risk    Level of Risk/Frequency of Follow-up: low     Assessment and Plan    Nutrition Problem  Increased nutrient needs, protein  Related to (etiology):   Wound healing  Signs and Symptoms (as evidenced by):   Stage 2 pressure ulcer  Interventions:  Collaboration with other providers  Nutrition  Diagnosis Status:   Continues     Monitor and Evaluation    Food and Nutrient Intake: energy intake, food and beverage intake  Food and Nutrient Adminstration: diet order  Knowledge/Beliefs/Attitudes: food and nutrition knowledge/skill, beliefs and attitudes  Physical Activity and Function: nutrition-related ADLs and IADLs  Anthropometric Measurements: weight, weight change  Biochemical Data, Medical Tests and Procedures: electrolyte and renal panel, gastrointestinal profile, glucose/endocrine profile, inflammatory profile, lipid profile  Nutrition-Focused Physical Findings: overall appearance, extremities, muscles and bones, skin     Malnutrition Assessment     Skin (Micronutrient): wounds unhealed(stage 2 pressure injury)       Energy Intake (Malnutrition): less than 75% for greater than or equal to 1 month   Orbital Region (Subcutaneous Fat Loss): well nourished  Upper Arm Region (Subcutaneous Fat Loss): well nourished  Thoracic and Lumbar Region: well nourished   Caodaism Region (Muscle Loss): well nourished  Clavicle Bone Region (Muscle Loss): well nourished  Scapular Bone Region (Muscle Loss): well nourished  Dorsal Hand (Muscle Loss): well nourished        Nutrition Follow-Up    RD Follow-up?: Yes

## 2019-05-14 NOTE — PLAN OF CARE
Problem: Physical Therapy Goal  Goal: Physical Therapy Goal  Goals to be met by: 19    Patient will increase functional independence with mobility by performin. Supine to sit with Modified [place]  2. Sit to supine with Modified [place]  3. Sit to stand transfer with SBA. Met   4. Bed to chair transfer with SBA using Rolling Walker or Rollator. met  5. Gait  x 30 feet with SBA using Rolling Walker or Rollator. Met   6. Wheelchair propulsion x100 feet with Supervision using bilateral upper extremities. Met   7. Ascend/Descend 4 inch curb step with Stand-by Assistance using Rolling Walker or Rollator  8. Stand for 2 minutes with Stand-by Assistance using Rolling Walker or Rollator   9. Lower extremity exercise program x30 reps per handout, with assistance as needed           Outcome: Ongoing (interventions implemented as appropriate)  Curb step goal reinstated. Pt will continue PT POC.    Brook Donnelly, PT  2019

## 2019-05-15 NOTE — PLAN OF CARE
Problem: Occupational Therapy Goal  Goal: Occupational Therapy Goal  Goals to be met by: 05-06-19 Goals extended to 05-13-19    Patient will increase functional independence with ADLs by performing:    UE Dressing with Mod I  MET  LE Dressing with Mod I.  REVISED LE dressing with SBA.  Grooming while seated with Mod I .  MET  Toileting from bedside commode with Mod I for hygiene and clothing management. REVISED  SBA with toileting with A/D to stand.  Bathing from  edge of bed with SBA.  Supine to sit with Modified [place].  Stand pivot transfers with Mod I .  Toilet transfer to bedside commode with Mod I.  Pt. To be I with HEP for BUE to improve endurance level        Outcome: Ongoing (interventions implemented as appropriate)  .    Comments: .

## 2019-05-15 NOTE — PT/OT/SLP PROGRESS
Occupational Therapy  Treatment    Jenni Todd   MRN: [id number]   Admitting Diagnosis: Problem with dialysis access    OT Date of Treatment: 05/15/19       Billable Minutes:40  Therapeutic Activity 40    General Precautions: Standard, diabetic, fall  Orthopedic Precautions: N/A  Braces: N/A    Spiritual, Cultural Beliefs, Islam Practices, Values that Affect Care: no    Subjective:  Communicated with nsgprior to session.  I am feeling werrl    Pain/Comfort  Pain Rating 1: 0/10  Pain Rating Post-Intervention 1: 0/10    Objective:   Pt. Seated in chair on arrival with daughter present     Occupational Performance:    Bed Mobility:    · Not tested     Functional Mobility/Transfers:  · Patient completed Sit <> Stand Transfer with contact guard assistance  with  rolling walker with cues from EOB to w/c with cues for safety    Activities of Daily Living:  · Not tested     AMPA 6 Click:  [facility] Total Score: 21    OT Exercises: UE Ergometer 10 min with intermittent rest breaks inbetween     Additional Treatment:  Pt. With standing  deferred on this day due to Pt. With feeling weak and low BP 88/60 and then 109/66 nsg notified    Pt. With # dowel activity with 2x15 reps with  shd flex, horz adb/add and forward flex motion and 2# dumb bells with bicep curls  to increase BUE ROM and strength.    Pt. With standing and therex performed to increase ROM, endurance selfcare task and fxl mobility for independence     Patient left up in chair with family member present  present    ASSESSMENT:  Jenni Todd is a 48 y.o. female with a medical diagnosis of Problem with dialysis access Pt. participated well with session on this day. Pt. Tends to fatigue easily during task therefore frequent breaks are required during session on this day. Pt demos physical deficits with balance  functional mobility, UB strength, endurance  level of functional indep with daily tasks and activities and selfcare skills .Pt. Will continue to  benefit from continued OT to progress towards goals  .    Rehab identified problem list/impairments: impaired endurance, impaired self care skills, impaired functional mobilty, impaired cardiopulmonary response to activity    Rehab potential is fair    Activity tolerance: Fair    Discharge recommendations: home health OT(may require S initially)     Barriers to discharge: Decreased caregiver support     Equipment recommendations: commode     GOALS:   Multidisciplinary Problems     Occupational Therapy Goals        Problem: Occupational Therapy Goal    Goal Priority Disciplines Outcome Interventions   Occupational Therapy Goal     OT, PT/OT Ongoing (interventions implemented as appropriate)    Description:  Goals to be met by: 05-06-19 Goals extended to 05-13-19    Patient will increase functional independence with ADLs by performing:    UE Dressing with Mod I  MET  LE Dressing with Mod I.  REVISED LE dressing with SBA.  Grooming while seated with Mod I .  MET  Toileting from bedside commode with Mod I for hygiene and clothing management. REVISED  SBA with toileting with A/D to stand.  Bathing from  edge of bed with SBA.  Supine to sit with Modified [place].  Stand pivot transfers with Mod I .  Toilet transfer to bedside commode with Mod I.  Pt. To be I with HEP for BUE to improve endurance level                     Plan:  Patient to be seen 5 x/week to address the above listed problems via self-care/home management, therapeutic activities, therapeutic exercises  Plan of Care expires: 05/23/19  Plan of Care reviewed with: patient    The SABRA and BIENVENIDO have collaborated and discussed the patient's status, treatment plan and progress toward established goals.   LUCIANA Waters 5/15/2019

## 2019-05-15 NOTE — PT/OT/SLP PROGRESS
Physical Therapy  Treatment    Jenni Todd   MRN: [id number]   Admitting Diagnosis: Problem with dialysis access    PT Received On: 05/15/19        Billable Minutes:  Therapeutic Activity 15, Therapeutic Exercise 38 and Total Time 53    Treatment Type: Treatment  PT/PTA: PT     PTA Visit Number: 0       General Precautions: Standard, diabetic, fall  Orthopedic Precautions: N/A   Braces: N/A    Spiritual, Cultural Beliefs, Anabaptism Practices, Values that Affect Care: no    Subjective:  Communicated with patient prior to session.  Pt agreeable to session but reporting inc'd fatigue. Nurse notified.    Pain/Comfort  Pain Rating 1: 0/10    Objective:  Patient found supine in bed.       AM-PAC 6 CLICK MOBILITY  Total Score:16    Bed Mobility:  Sit>Supine: on mat w/ SPV  Supine>Sit:    On bed w/ SBA, HOB elevated and w/ side rail   On mat w/ ModA for trunk    Transfers:  Stand Pivot Transfer: EOB>w/c and w/c<>EOM; all w/ rollator and CGA   Cueing for forward lean  inc'd time required to fully rise    Gait:  Pt declined due to fatigue     Therex:  Supine and seated BLE therex 2x15 reps (GS, SAQ, AP, HF, LAQ)  inc'd time and rest breaks required due to fatigue    Additional Treatment:  Seated UBE x15min to inc overall endurance    Patient left up in chair with call button in reach and nurse notified.    Assessment:  Jenni Todd is a 48 y.o. female with a medical diagnosis of Problem with dialysis access.  Pt's session was limited by fatigue and low BP- 93/599mmHg in sitting- nurse was notified. Pt complete BLE therex and UBE for endurance. She will continue PT POC.    Rehab identified problem list/impairments: weakness, impaired endurance, impaired sensation, impaired self care skills, impaired functional mobilty, gait instability, impaired balance, decreased coordination, decreased lower extremity function, impaired cardiopulmonary response to activity    Rehab potential is fair.    Activity tolerance:  Fair    Discharge recommendations: home health PT     Barriers to discharge: Decreased caregiver support    Equipment recommendations: commode     GOALS:   Multidisciplinary Problems     Physical Therapy Goals        Problem: Physical Therapy Goal    Goal Priority Disciplines Outcome Goal Variances Interventions   Physical Therapy Goal     PT, PT/OT Ongoing (interventions implemented as appropriate)     Description:  Goals to be met by: 19    Patient will increase functional independence with mobility by performin. Supine to sit with Modified [place]  2. Sit to supine with Modified [place]  3. Sit to stand transfer with SBA. Met   4. Bed to chair transfer with SBA using Rolling Walker or Rollator. met  5. Gait  x 30 feet with SBA using Rolling Walker or Rollator. Met   6. Wheelchair propulsion x100 feet with Supervision using bilateral upper extremities. Met   7. Ascend/Descend 4 inch curb step with Stand-by Assistance using Rolling Walker or Rollator  8. Stand for 2 minutes with Stand-by Assistance using Rolling Walker or Rollator   9. Lower extremity exercise program x30 reps per handout, with assistance as needed                             PLAN:    Patient to be seen 5 x/week  to address the above listed problems via gait training, therapeutic activities, therapeutic exercises  Plan of Care expires: 19  Plan of Care reviewed with: patient    Brook KEVIN Donnelly, PT  05/15/2019

## 2019-05-15 NOTE — PLAN OF CARE
Problem: Adult Inpatient Plan of Care  Goal: Plan of Care Review  Monitored for pain and safety. No new skin break down noted.

## 2019-05-15 NOTE — PLAN OF CARE
Problem: Physical Therapy Goal  Goal: Physical Therapy Goal  Goals to be met by: 19    Patient will increase functional independence with mobility by performin. Supine to sit with Modified [place]  2. Sit to supine with Modified [place]  3. Sit to stand transfer with SBA. Met   4. Bed to chair transfer with SBA using Rolling Walker or Rollator. met  5. Gait  x 30 feet with SBA using Rolling Walker or Rollator. Met   6. Wheelchair propulsion x100 feet with Supervision using bilateral upper extremities. Met   7. Ascend/Descend 4 inch curb step with Stand-by Assistance using Rolling Walker or Rollator  8. Stand for 2 minutes with Stand-by Assistance using Rolling Walker or Rollator   9. Lower extremity exercise program x30 reps per handout, with assistance as needed            Outcome: Ongoing (interventions implemented as appropriate)  LTGs remain appropriate. Pt will continue PT POC.    Brook Donnelly, PT  5/15/2019

## 2019-05-16 NOTE — PT/OT/SLP PROGRESS
Physical Therapy  Treatment    Jenni Todd   MRN: [id number]   Admitting Diagnosis: Problem with dialysis access    PT Received On: 05/16/19        Billable Minutes:  Therapeutic Activity 10, Therapeutic Exercise 28 and Total Time 38    Treatment Type: Treatment  PT/PTA: PT     PTA Visit Number: 0       General Precautions: Standard, diabetic, fall  Orthopedic Precautions: N/A   Braces: N/A    Spiritual, Cultural Beliefs, Anglican Practices, Values that Affect Care: no    Subjective:  Communicated with patient prior to session.  Pt continues to report low BP and inc'd fatigue. Nursing is aware.     Pain/Comfort  Pain Rating 1: 0/10    Objective:  Patient found sitting in w/c.       AM-PAC 6 CLICK MOBILITY  Total Score:14    Bed Mobility:  Not performed    Transfers:  Sit<>Stand: to/from w/c (2 trials) w/ rollator and CGA for safety  inc'd time required to rise    Gait:  deffered due to low BP and inc'd fatigue    Therex:  Seated BLE therex 2x15 reps (HF, LAQ, AP)    Balance:  Static stand 2 trials (~30s each) w/ rollator and CGA  Limited by fatigue    2 standing BPs= 101/63mmHg and 102/67mmHg    Additional Treatment:  Seated UBE x15min to inc overall endurance, rest breaks required every few minutes    Patient left up in chair with call button in reach.    Assessment:  Jenni Todd is a 48 y.o. female with a medical diagnosis of Problem with dialysis access.  Pt continues to be limited during sessions by low BP and fatigue. She has been unable to ambulate since 5/10 due to these symptoms. She will continue PT POC.    Rehab identified problem list/impairments: weakness, impaired endurance, impaired sensation, impaired self care skills, impaired functional mobilty, gait instability, impaired balance, decreased coordination, decreased lower extremity function, impaired cardiopulmonary response to activity    Rehab potential is fair.    Activity tolerance: Fair    Discharge recommendations: home health PT      Barriers to discharge: Decreased caregiver support    Equipment recommendations: commode     GOALS:   Multidisciplinary Problems     Physical Therapy Goals        Problem: Physical Therapy Goal    Goal Priority Disciplines Outcome Goal Variances Interventions   Physical Therapy Goal     PT, PT/OT Ongoing (interventions implemented as appropriate)     Description:  Goals to be met by: [id number]    Patient will increase functional independence with mobility by performin. Supine to sit with Modified [place]  2. Sit to supine with Modified [place]  3. Sit to stand transfer with SBA. Met   4. Bed to chair transfer with SBA using Rolling Walker or Rollator. met  5. Gait  x 30 feet with SBA using Rolling Walker or Rollator. Met   6. Wheelchair propulsion x100 feet with Supervision using bilateral upper extremities. Met   7. Ascend/Descend 4 inch curb step with Stand-by Assistance using Rolling Walker or Rollator  8. Stand for 2 minutes with Stand-by Assistance using Rolling Walker or Rollator   9. Lower extremity exercise program x30 reps per handout, with assistance as needed                              PLAN:    Patient to be seen 5 x/week  to address the above listed problems via gait training, therapeutic activities, therapeutic exercises  Plan of Care expires: 19  Plan of Care reviewed with: patient    Brook Donnelly, PT  2019

## 2019-05-16 NOTE — PT/OT/SLP PROGRESS
Occupational Therapy  Treatment    Jenni Todd   MRN: [id number]   Admitting Diagnosis: Problem with dialysis access    OT Date of Treatment: 05/16/19       Billable Minutes:45  Self Care/Home Management 45    General Precautions: Standard, diabetic, fall  Orthopedic Precautions: N/A  Braces: N/A    Spiritual, Cultural Beliefs, Mandaeism Practices, Values that Affect Care: no    Subjective:  Communicated with nsg prior to session.  I am just tired this morning    Pain/Comfort  Pain Rating 1: 0/10  Pain Rating Post-Intervention 1: 0/10    Objective:    Pt. Seated EOB on arrival  Occupational Performance:    Bed Mobility:    ·  Not tested    Functional Mobility/Transfers:  · Patient completed Sit <> Stand Transfer with stand by assistance and contact guard assistance  with  no assistive device and hand-held assist   · Patient completed Bed <> Chair Transfer using Stand Pivot technique with stand by assistance with no assistive device      Activities of Daily Living:  · Feeding:  modified independence with breakfast  · Grooming: modified independence at sink level  · Bathing: minimum assistance for BLE feet at sink level  · Upper Body Dressing: modified independence to víctor pull over top seated  · Lower Body Dressing: minimum assistance to víctor pants seated and (A) to mange over rear instance and TA for BLE socks to doff/víctor    AMPAC 6 Click:  AMPAC Total Score: 21    Patient left up in chair with all lines intact and call button in reach    ASSESSMENT:  Jenni Todd is a 48 y.o. female with a medical diagnosis of Problem with dialysis access Pt. participated well with session on this day Pt. With reports of minor fatigue on this day and not resting well. Pt. Also continues to exhibit decrease endurance during task resulting in frequent rest breaks. .Pt demos physical deficits with balance  functional mobility, UB strength, endurance  level of functional indep with daily tasks and activities and selfcare  skills .Pt. Will continue to benefit from continued OT to progress towards goals  .    Rehab identified problem list/impairments: impaired endurance, impaired self care skills, impaired functional mobilty, impaired cardiopulmonary response to activity    Rehab potential is fair    Activity tolerance: Fair    Discharge recommendations: home health OT(may require S initially)     Barriers to discharge: Decreased caregiver support     Equipment recommendations: commode     GOALS:   Multidisciplinary Problems     Occupational Therapy Goals        Problem: Occupational Therapy Goal    Goal Priority Disciplines Outcome Interventions   Occupational Therapy Goal     OT, PT/OT Ongoing (interventions implemented as appropriate)    Description:  Goals to be met by: 05-06-19 Goals extended to 05-13-19    Patient will increase functional independence with ADLs by performing:    UE Dressing with Mod I  MET  LE Dressing with Mod I.  REVISED LE dressing with SBA.  Grooming while seated with Mod I .  MET  Toileting from bedside commode with Mod I for hygiene and clothing management. REVISED  SBA with toileting with A/D to stand.  Bathing from  edge of bed with SBA.  Supine to sit with Modified [place].  Stand pivot transfers with Mod I .  Toilet transfer to bedside commode with Mod I.  Pt. To be I with HEP for BUE to improve endurance level                         Plan:  Patient to be seen 5 x/week to address the above listed problems via self-care/home management, therapeutic activities, therapeutic exercises  Plan of Care expires: 05/23/19  Plan of Care reviewed with: patient    LUCIANA Waters  05/16/2019

## 2019-05-16 NOTE — PLAN OF CARE
Problem: Physical Therapy Goal  Goal: Physical Therapy Goal  Goals to be met by: [id number]    Patient will increase functional independence with mobility by performin. Supine to sit with Modified [place]  2. Sit to supine with Modified [place]  3. Sit to stand transfer with SBA. Met   4. Bed to chair transfer with SBA using Rolling Walker or Rollator. met  5. Gait  x 30 feet with SBA using Rolling Walker or Rollator. Met   6. Wheelchair propulsion x100 feet with Supervision using bilateral upper extremities. Met   7. Ascend/Descend 4 inch curb step with Stand-by Assistance using Rolling Walker or Rollator  8. Stand for 2 minutes with Stand-by Assistance using Rolling Walker or Rollator   9. Lower extremity exercise program x30 reps per handout, with assistance as needed            Outcome: Ongoing (interventions implemented as appropriate)  LTGs remain appropriate. Pt will continue PT POC.    Brook Donnelly, PT  2019

## 2019-05-16 NOTE — PLAN OF CARE
Problem: Occupational Therapy Goal  Goal: Occupational Therapy Goal  Goals to be met by: 05-06-19 Goals extended to 05-13-19    Patient will increase functional independence with ADLs by performing:    UE Dressing with Mod I  MET  LE Dressing with Mod I.  REVISED LE dressing with SBA.  Grooming while seated with Mod I .  MET  Toileting from bedside commode with Mod I for hygiene and clothing management. REVISED  SBA with toileting with A/D to stand.  Bathing from  edge of bed with SBA.  Supine to sit with Modified [place].  Stand pivot transfers with Mod I .  Toilet transfer to bedside commode with Mod I.  Pt. To be I with HEP for BUE to improve endurance level        Outcome: Ongoing (interventions implemented as appropriate)  .

## 2019-05-16 NOTE — PROGRESS NOTES
[facility]                                  Skilled Nursing Facility                   Progress Note     Admit Date: 4/22/2019  JESSY 5/17/2019  Principal Problem:  Problem with dialysis access   HPI obtained from patient interview and chart review     Chief Complaint:  Patient reporting increased cough and sputum production    HPI: Mrs. Todd is 48 year old female with PMHx of of coronary artery disease, chronic systolic and diastolic heart failure (EF 25%), and ESRF on peritoneal dialysis since 2004 presented to [facility] on 2/21 with abdominal pain. She was initiated on treatment for peritonitis and had removal of peritoneal catheter. She developed active GI bleed and associated encephalopathy. Patient had emergent EGD 3/13 that showed erosive esophagitis with small ulcer and clotted blood in the gastric fundus. The following day she had repeat EGD that found one non-bleeding cratered gastric ulcer with clean ulcer base. She was then transfer to [facility] ICU for vascular surgery assistance in dialysis access placement. She had RUE graft 3/22 placed, but it clotted. She has poor vascular options and vascular surgery recommended that she continue with peritoneal access if possible. She is currently being dialyzed with permacath in left groin placed 3/28. She developed NSTEMI day after groin catheter placement. Cardiology recommended goal directed medical therapy. Patient was transferred here to [facility] on 4/4 for debility. She returned to [facility] ER on 4/6 after her dialysis access did not work. TPA was instilled in her permacath and she was able to undergo a full dialysis session.     Interval Hx:  Patient reports worsening cough and sputum production.  Her cough is intermittent with thick slightly yellow tinged sputum.  Patient's breath sounds are coarse.  Patient states she feels like she has a cold.     Past Medical History:  Patient has a past medical history of Abnormal finding on Pap smear, HPV DNA positive (), Anemia associated with chronic renal failure, Blood type B+, Bulging discs - symptomatic , CAD (coronary artery disease), Cardiomyopathy (3/13/2019), Diabetes mellitus, type 2, ESRD (end stage renal disease) (2004), FSGS (focal segmental glomerulosclerosis), Hyperlipidemia, Hypertension (), Neuropathy, NSTEMI (non-ST elevated myocardial infarction) (3/29/2019), Obesity, Secondary hyperparathyroidism, renal, TIA (transient ischemic attack), and Uterine fibroid.    Past Surgical History: Patient has a past surgical history that includes Peritoneal catheter insertion; Umbilical hernia repair;  section, classic; Dialysis fistula creation; Cardiac catheterization; Incision and drainage of wound (Left, ); Open reduction and internal fixation (ORIF) of injury of hip (Left, 2018); Colonoscopy (N/A, 2018); Esophagogastroduodenoscopy (N/A, 3/13/2019); Esophagogastroduodenoscopy (N/A, 3/14/2019); Peritoneal catheter removal (N/A, 3/14/2019); Insertion of tunneled central venous hemodialysis catheter (N/A, 3/18/2019); Phlebography (Right, 3/20/2019); Placement of arteriovenous graft (Right, 3/22/2019); Insertion of tunneled central venous hemodialysis catheter (N/A, 3/28/2019); Left heart catheterization (Left, 4/15/2019); Insertion of intra-aortic balloon assist device (4/15/2019); and Coronary angiography (N/A, 4/15/2019).    Social History: Patient reports that she has never smoked. She has never used smokeless tobacco. She reports that she does not drink alcohol or use drugs.    Family History: family history includes Cancer in her maternal grandmother and paternal grandfather; Diabetes in her maternal aunt and paternal aunt.    Allergies: Patient is allergic to clindamycin; flagyl [metronidazole hcl]; and metronidazole.    ROS  Constitutional: Negative for fever and malaise/fatigue.   Eyes: Negative  for blurred vision, double vision and discharge.   Respiratory: + intermittent productive for cough. Neg for shortness of breath and wheezing.    Cardiovascular: Negative for chest pain, palpitations, claudication, leg swelling and PND.   Gastrointestinal: Negative for abdominal pain, constipation, diarrhea, nausea and vomiting. + increased abdominal girth  Genitourinary:  Pt is Anuric  Musculoskeletal:  + generalized weakness. Negative for back pain and myalgias.   Skin: Negative for itching and rash.   Neurological: Negative for dizziness, speech change, seizures, and headaches.   Psychiatric/Behavioral: Negative for depression. The patient is not nervous/anxious.      PEx  Temp:  [98.8 °F (37.1 °C)-99.4 °F (37.4 °C)]   Pulse:  []   Resp:  [17-20]   BP: ()/(54-66)   SpO2:  [98 %]   Body mass index is 33.18 kg/m².      Constitutional: Patient appears well-developed and in no distress.   HENT:   Head: Normocephalic and atraumatic.   Eyes: Pupils are equal, round, and reactive to light.   Neck: Normal range of motion. Neck supple.   Cardiovascular: Normal rate, regular rhythm and normal heart sounds.    Pulmonary/Chest: Effort normal and breath sounds are coarse, nasal congestion  Abdominal: Soft. Bowel sounds are normal.  Musculoskeletal: Normal range of motion. + generalized weakness  Neurological: Alert and oriented to person, place, and time.   Skin: Skin is warm and dry. + Lt femoral HD cath present, intact without any signs of infection, dressing changed today.  Abdominal wounds from previous PD catheter, unapproximated with packing and occlusive dressing.  Wound Care following.   Psychiatric: Normal mood and affect. Behavior is normal.     Assessment and Plan:    Upper respiratory infection  Rhinitis  Cough  - worsening  - 5/13 Continue Zyrtec, mucinex  - 5/13 chest x-ray initiated  - 5/14 Review of CXR revealed no acute cardiopulmonary process. Findings improved compared to chest x-ray dated  04/18. T-max 100° F, blood culture obtained from left femoral tunneled catheter. Repeat labs ordered for the a.m.  - 5/15 WBC trending down to 11.83 since antibiotic initiation, continue to monitor.  - 5/16 initiated duo nebs q.6 hours, discontinued Mucinex initiated Mucinex DM 2 tabs 60/300 mg.     ESRD (end stage renal disease) on dialysis  - 5/16 decreased cinacalcet to 60 mg daily; patient's phos 3.5    ESRD (end stage renal disease) on dialysis  peritonitis due to peritoneal dialysis catheter  Leukocytosis  -Previously PD patient; admitted 2/21 at [facility] for peritonitis, had PD catheter removed for recurrent peritonitis & transitioned to HD. Abx complete for peritonitis  Bilateral IJ tunneled catheters were attempted without success due to thrombus. Now has a temporary dialysis catheter in her left fem that was placed 3/28.  Prior to permacath placement, she had a next day graft placed by Dr. Leger on 3/22, but it was never used and was clotted 1 day post-op. Patient refused to have the graft declotted, so the permacatch was placed at that time.  -4/25 HD TTS, HD today  -4/30 abd distention is greatly improved with HD treatments.  -5/2  WBC at 12.89, continue to monitor. Bun/Cr at 25/5.8, continue dialysis TTS.  - 5/6 WBC increased to  14.  Patient has been afebrile and does not appear to look ill.  She is having regular bowel movements.  She does have a slight nonproductive intermittent cough.  She is anuric. pts fem line did not have proper dressing. Charge nurse replaced dressing with proper central line dressing. Will repeat CBC tomorrow, 5/7   - Patient went to general surgery appointment last Friday on 05/03.  She will return to their clinic in 2 weeks to see about a PD replacement catheter.  She will need cardiac clearance and for her abdomen wounds to heal.   - 5/13 WBC spike to 20.20 and mild increase in temperature to 99.1, Procalcitonin positive at 1.18. Discharge postponed and infectious workup  initiated. Blood cx x 2, Bladder scan, and straight cath for UA with reflex initiated.   - 5/13 CR at 4.8, continue HD TTS.   - 5/15 Initiated heparin, porcine (PF) injection 1,000 Units for hep lock of dialysis port p.r.n. WBC trending down to 11.83 since antibiotic initiation, continue to monitor.  - 5/16 patient remains with unapproximated abdominal wound.  Ambulatory referral to outpatient wound care placed    CONTINUED    Anemia in chronic kidney disease, on chronic dialysis  -Continue Epo per neph and protonix  -5/2 H&H at 7.6/25.3, continue Epo  - 5/6 H&H improved to 7.8/25   - 5/13 H&H decreased to 7/22.9.   - 5/15 H&H at 6.9/23.4, initiated ferrous sulfate EC tablet 325 mg daily. Initiated CBC and Type & Screen for 05/17. Initiated heparin, porcine (PF) injection 1,000 Units for hep lock of dialysis port p.r.n.     Hypomagnesemia  -4/29 initiated mag oxide tab 400 mg daily  -5/2 increased mag oxide to 400 mg bid  - 5/6 improved to 1.6 continue Mag-Ox  - 5/13 increased magnesium tablet 400 mg t.i.d.    Hemorrhoids  -5/2 initiated phenyleph-shark radha oil-mo-pet ointment 1 applicator to be applied to rectum b.i.d.  - 5/6 stable, continues to use medication    high risk for imbalanced nutrition  -5/1 Continued 2000 diabetic diet. Increased fluid restriction to 1500 ml/day. Discontinued renal restriction due to food preference and the need for increase nutrional intake. Goal intake is 85% Of EEN    Debility  - Continue with PT/OT for gait training and strengthening and restoration of ADL's   - Encourage mobility, OOB in chair, and early ambulation as appropriate  - Fall precautions   - Monitor for bowel and bladder dysfunction  - Monitor for and prevent skin breakdown and pressure ulcers  - Continue DVT prophylaxis with heparin   -4/30 Patient is reporting that she does not want to take heparin due to her haveing an acute anemic episode requiring transition to ED in the past. Patient educated on the importance  of dvt prophylaxis with decrease mobility, she has decided to continue to refuse heparin.     NSTEMI (non-ST elevated myocardial infarction)  Acute systolic heart failure  -s/p ACS protocol (DAPT, statin, heparin drip stopped)/NSTEMI   -CT surgery/CABG eval revealed, not a candidate due to comorbidities.   -HTS consult as outpatient to discuss advanced options  -LV EF of 25%  -4/25 Continue HD as outpatient for volume removal  -Continue Asa, statin, and plavix daily  -Discontinued Coreg   -Discontinue irbesartan  -F/u with cards, Ndandu    Pressure injury of sacral region, stage 2  Abdominal wounds  -Wound care consulted  - continue care for abdominal wounds- cleanse with normal saline and gauze pack wound with Aquacel Ag and secure with foam border to change on Monday Wednesday and Friday or as needed if soiled  - continue wound care for pressure injury of sacral area- nursing to cleanse buttocks perineal area b.i.d. and as needed with wipes apply a thin layer of triad paste b.i.d. and as needed.  No diapers.  Turn q.2 hours.     DM II with neuropathy   - Continue Gabapentin 300 mg bid    Future Appointments   Date Time Provider Department Center   5/21/2019  7:15 AM Sung Alonso MD [facility] CHELE Donnelly NP

## 2019-05-17 NOTE — PROGRESS NOTES
[facility]                                  Skilled Nursing Facility                   Progress Note     Admit Date: 4/22/2019  JESSY 5/17/2019  Principal Problem:  Problem with dialysis access   HPI obtained from patient interview and chart review     Chief Complaint:  Re-evaluation of cold symptoms; lab review    HPI: Mrs. Todd is 48 year old female with PMHx of of coronary artery disease, chronic systolic and diastolic heart failure (EF 25%), and ESRF on peritoneal dialysis since 2004 presented to [facility] on 2/21 with abdominal pain. She was initiated on treatment for peritonitis and had removal of peritoneal catheter. She developed active GI bleed and associated encephalopathy. Patient had emergent EGD 3/13 that showed erosive esophagitis with small ulcer and clotted blood in the gastric fundus. The following day she had repeat EGD that found one non-bleeding cratered gastric ulcer with clean ulcer base. She was then transfer to [facility] ICU for vascular surgery assistance in dialysis access placement. She had RUE graft 3/22 placed, but it clotted. She has poor vascular options and vascular surgery recommended that she continue with peritoneal access if possible. She is currently being dialyzed with permacath in left groin placed 3/28. She developed NSTEMI day after groin catheter placement. Cardiology recommended goal directed medical therapy. Patient was transferred here to [facility] on 4/4 for debility. She returned to [facility] ER on 4/6 after her dialysis access did not work. TPA was instilled in her permacath and she was able to undergo a full dialysis session.     Interval Hx:  Patient reported worsening cough and sputum production yesterday, 5/16.  Breathing treatments ordered along with Mucinex DM.  Patient reports improved cold symptoms today.  Patient's WBCs have remained stable 11.9.  Patient's H&H remains low but  6.9.  Patient's blood pressure was very low after dialysis today with systolics in the 80s.  Patient is asymptomatic.  Patient given an extra dose of midodrine; however, blood pressure remains the same.  Patient is having extra dialysis treatments which is likely the cause of her hypotension.       Past Medical History: Patient has a past medical history of Abnormal finding on Pap smear, HPV DNA positive (), Anemia associated with chronic renal failure, Blood type B+, Bulging discs - symptomatic , CAD (coronary artery disease), Cardiomyopathy (3/13/2019), Diabetes mellitus, type 2, ESRD (end stage renal disease) (2004), FSGS (focal segmental glomerulosclerosis), Hyperlipidemia, Hypertension (), Neuropathy, NSTEMI (non-ST elevated myocardial infarction) (3/29/2019), Obesity, Secondary hyperparathyroidism, renal, TIA (transient ischemic attack), and Uterine fibroid.    Past Surgical History: Patient has a past surgical history that includes Peritoneal catheter insertion; Umbilical hernia repair;  section, classic; Dialysis fistula creation; Cardiac catheterization; Incision and drainage of wound (Left, ); Open reduction and internal fixation (ORIF) of injury of hip (Left, 2018); Colonoscopy (N/A, 2018); Esophagogastroduodenoscopy (N/A, 3/13/2019); Esophagogastroduodenoscopy (N/A, 3/14/2019); Peritoneal catheter removal (N/A, 3/14/2019); Insertion of tunneled central venous hemodialysis catheter (N/A, 3/18/2019); Phlebography (Right, 3/20/2019); Placement of arteriovenous graft (Right, 3/22/2019); Insertion of tunneled central venous hemodialysis catheter (N/A, 3/28/2019); Left heart catheterization (Left, 4/15/2019); Insertion of intra-aortic balloon assist device (4/15/2019); and Coronary angiography (N/A, 4/15/2019).    Social History: Patient reports that she has never smoked. She has never used smokeless tobacco. She reports that she does not drink alcohol or use  drugs.    Family History: family history includes Cancer in her maternal grandmother and paternal grandfather; Diabetes in her maternal aunt and paternal aunt.    Allergies: Patient is allergic to clindamycin; flagyl [metronidazole hcl]; and metronidazole.    ROS  Constitutional: Negative for fever and malaise/fatigue.   Eyes: Negative for blurred vision, double vision and discharge.   Respiratory: + intermittent productive for cough. Neg for shortness of breath and wheezing.    Cardiovascular: Negative for chest pain, palpitations, claudication, leg swelling and PND.   Gastrointestinal: Negative for abdominal pain, constipation, diarrhea, nausea and vomiting. + increased abdominal girth  Genitourinary:  Pt is Anuric  Musculoskeletal:  + generalized weakness. Negative for back pain and myalgias.   Skin: Negative for itching and rash.   Neurological: Negative for dizziness, speech change, seizures, and headaches.   Psychiatric/Behavioral: Negative for depression. The patient is not nervous/anxious.      PEx  Temp:  [97.1 °F (36.2 °C)-99.1 °F (37.3 °C)]   Pulse:  []   Resp:  [16-20]   BP: ()/(52-75)   SpO2:  [97 %-100 %]   Body mass index is 32.73 kg/m².      Constitutional: Patient appears well-developed and in no distress.   HENT:   Head: Normocephalic and atraumatic.   Eyes: Pupils are equal, round, and reactive to light.   Neck: Normal range of motion. Neck supple.   Cardiovascular: Normal rate, regular rhythm and normal heart sounds.    Pulmonary/Chest: Effort normal and breath sounds are coarse, nasal congestion  Abdominal: Soft. Bowel sounds are normal.  Musculoskeletal: Normal range of motion. + generalized weakness  Neurological: Alert and oriented to person, place, and time.   Skin: Skin is warm and dry. + Lt femoral HD cath present, intact without any signs of infection, dressing changed today.  Abdominal wounds from previous PD catheter, unapproximated with packing and occlusive dressing.   Wound Care following.   Psychiatric: Normal mood and affect. Behavior is normal.     Assessment and Plan:    Upper respiratory infection  Rhinitis  Cough  - worsening  - 5/13 Continue Zyrtec, mucinex  - 5/13 chest x-ray initiated  - 5/14 Review of CXR revealed no acute cardiopulmonary process. Findings improved compared to chest x-ray dated 04/18. T-max 100° F, blood culture obtained from left femoral tunneled catheter. Repeat labs ordered for the a.m.  - 5/15 WBC trending down to 11.83 since antibiotic initiation, continue to monitor.  - 5/16 initiated duo nebs q.6 hours, discontinued Mucinex initiated Mucinex DM 2 tabs 60/300 mg.   - 5/17 slight symptom improvement    Anemia in chronic kidney disease, on chronic dialysis  -Continue Epo per neph and protonix  -5/2 H&H at 7.6/25.3, continue Epo  - 5/6 H&H improved to 7.8/25   - 5/13 H&H decreased to 7/22.9.   - 5/15 H&H at 6.9/23.4, initiated ferrous sulfate EC tablet 325 mg daily. Initiated CBC and Type & Screen for 05/17. Initiated heparin, porcine (PF) injection 1,000 Units for hep lock of dialysis port p.r.n.   - 5/17 initiated increased to ferrous sulfate to b.i.d. Dosing. H&H low but stable at 6.9/23; will recheck on 5/19.     CONTINUE    ESRD (end stage renal disease) on dialysis  - 5/16 decreased cinacalcet to 60 mg daily; patient's phos 3.5    ESRD (end stage renal disease) on dialysis  peritonitis due to peritoneal dialysis catheter  Leukocytosis  -Previously PD patient; admitted 2/21 at [facility] for peritonitis, had PD catheter removed for recurrent peritonitis & transitioned to HD. Abx complete for peritonitis  Bilateral IJ tunneled catheters were attempted without success due to thrombus. Now has a temporary dialysis catheter in her left fem that was placed 3/28.  Prior to permacath placement, she had a next day graft placed by Dr. Leger on 3/22, but it was never used and was clotted 1 day post-op. Patient refused to have the graft declotted, so the  permacatch was placed at that time.  -4/25 HD TTS, HD today  -4/30 abd distention is greatly improved with HD treatments.  -5/2  WBC at 12.89, continue to monitor. Bun/Cr at 25/5.8, continue dialysis TTS.  - 5/6 WBC increased to  14.  Patient has been afebrile and does not appear to look ill.  She is having regular bowel movements.  She does have a slight nonproductive intermittent cough.  She is anuric. pts fem line did not have proper dressing. Charge nurse replaced dressing with proper central line dressing. Will repeat CBC tomorrow, 5/7   - Patient went to general surgery appointment last Friday on 05/03.  She will return to their clinic in 2 weeks to see about a PD replacement catheter.  She will need cardiac clearance and for her abdomen wounds to heal.   - 5/13 WBC spike to 20.20 and mild increase in temperature to 99.1, Procalcitonin positive at 1.18. Discharge postponed and infectious workup initiated. Blood cx x 2, Bladder scan, and straight cath for UA with reflex initiated.   - 5/13 CR at 4.8, continue HD TTS.   - 5/15 Initiated heparin, porcine (PF) injection 1,000 Units for hep lock of dialysis port p.r.n. WBC trending down to 11.83 since antibiotic initiation, continue to monitor.  - 5/16 patient remains with unapproximated abdominal wound.  Ambulatory referral to outpatient wound care placed    Hypomagnesemia  -4/29 initiated mag oxide tab 400 mg daily  -5/2 increased mag oxide to 400 mg bid  - 5/6 improved to 1.6 continue Mag-Ox  - 5/13 increased magnesium tablet 400 mg t.i.d.    Hemorrhoids  -5/2 initiated phenyleph-shark radha oil-mo-pet ointment 1 applicator to be applied to rectum b.i.d.  - 5/6 stable, continues to use medication    high risk for imbalanced nutrition  -5/1 Continued 2000 diabetic diet. Increased fluid restriction to 1500 ml/day. Discontinued renal restriction due to food preference and the need for increase nutrional intake. Goal intake is 85% Of EEN    Debility  - Continue with  PT/OT for gait training and strengthening and restoration of ADL's   - Encourage mobility, OOB in chair, and early ambulation as appropriate  - Fall precautions   - Monitor for bowel and bladder dysfunction  - Monitor for and prevent skin breakdown and pressure ulcers  - Continue DVT prophylaxis with heparin   -4/30 Patient is reporting that she does not want to take heparin due to her haveing an acute anemic episode requiring transition to ED in the past. Patient educated on the importance of dvt prophylaxis with decrease mobility, she has decided to continue to refuse heparin.     NSTEMI (non-ST elevated myocardial infarction)  Acute systolic heart failure  -s/p ACS protocol (DAPT, statin, heparin drip stopped)/NSTEMI   -CT surgery/CABG eval revealed, not a candidate due to comorbidities.   -HTS consult as outpatient to discuss advanced options  -LV EF of 25%  -4/25 Continue HD as outpatient for volume removal  -Continue Asa, statin, and plavix daily  -Discontinued Coreg   -Discontinue irbesartan  -F/u with cards, Ndandu    Pressure injury of sacral region, stage 2  Abdominal wounds  -Wound care consulted  - continue care for abdominal wounds- cleanse with normal saline and gauze pack wound with Aquacel Ag and secure with foam border to change on Monday Wednesday and Friday or as needed if soiled  - continue wound care for pressure injury of sacral area- nursing to cleanse buttocks perineal area b.i.d. and as needed with wipes apply a thin layer of triad paste b.i.d. and as needed.  No diapers.  Turn q.2 hours.     DM II with neuropathy   - Continue Gabapentin 300 mg bid    Future Appointments   Date Time Provider Department Center   5/30/2019  6:45 PM Sung Alonso MD [facility] CHELE Donnelly NP

## 2019-05-17 NOTE — PLAN OF CARE
Problem: Adult Inpatient Plan of Care  Goal: Plan of Care Review  Outcome: Ongoing (interventions implemented as appropriate)  Free of falls throughout shift. Bed in lowest position and call light in reach. Acu checks AC HS and BG controlled with diet and PRN sliding scale insulin. No complaints of pain. Dressings to abdomen cleansed and changed per orders. Dialysis today.Hypotensive and NP notified. Will continue to monitor.

## 2019-05-18 NOTE — PLAN OF CARE
[facility] [place]    HOME HEALTH ORDERS  FACE TO FACE ENCOUNTER    Patient Name: Jenni Todd  YOB: 1970    PCP: Michael Tan Iii, MD   PCP Address: [address] / [place] [address]  PCP Phone Number: [phone]  PCP Fax: [phone]    Encounter Date: 05/18/2019    Admit to Home Health    Diagnoses:  Active Hospital Problems    Diagnosis  POA    *Problem with dialysis access [T82.898A]  Yes    Upper respiratory infection [J06.9]  No    Rhinitis [J31.0]  No    At high risk for imbalanced nutrition [Z91.89]  Not Applicable    Permanent central venous catheter in place [Z95.828]  Not Applicable    Hypervolemia [E87.70]  Yes    Acute on chronic combined systolic and diastolic heart failure [I50.43]  Yes    Chronic right-sided heart failure [I50.812]  Yes    Moderate to severe pulmonary hypertension [I27.20]  Yes    Diabetic polyneuropathy associated with type 2 diabetes mellitus [E11.42]  Yes    Multiple wounds [T07.XXXA]  Yes    Pressure injury of sacral region, stage 2 [L89.152]  Yes    NSTEMI (non-ST elevated myocardial infarction) [I21.4]  Yes    Acute gastric ulcer with hemorrhage [K25.0]  Yes    Intra-dialytic hypotension [I95.3]  Yes    Peritonitis associated with peritoneal dialysis [T85.71XA]  Yes    Acute blood loss anemia [D62]  Yes    Anemia in chronic kidney disease, on chronic dialysis [N18.6, D63.1, Z99.2]  Not Applicable     on Epogen      Type 2 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 and hypertension [E11.22, I12.0, N18.5]  Yes    ESRD (end stage renal disease) on PD ( initiated dialysis 04/20/2004) [N18.6]  Yes     Nightly PD        Resolved Hospital Problems   No resolved problems to display.       Future Appointments   Date Time Provider Department Center   5/30/2019  6:45 PM Sung Alonso MD [facility]     Follow-up Information     Michael Tan Iii, MD In 1 week.    Specialty:  Family  Medicine  Contact information:  [address] [place] [address]  [phone]                 I have seen and examined this patient face to face today. My clinical findings that support the need for the home health skilled services and home bound status are the following:  Weakness/numbness causing balance and gait disturbance due to Infection making it taxing to leave home.    Allergies:  Review of patient's allergies indicates:   Allergen Reactions    Clindamycin Diarrhea    Flagyl [metronidazole hcl]     Metronidazole        Diet: renal diet     Activities:  Activity as tolerated    Nursing:   SN to complete comprehensive assessment including routine vital signs. Instruct on disease process and s/s of complications to report to MD. Review/verify medication list sent home with the patient at time of discharge  and instruct patient/caregiver as needed. Frequency may be adjusted depending on start of care date.    Notify MD if SBP > 160 or < 90; DBP > 90 or < 50; HR > 120 or < 50; Temp > 101;    CONSULTS:    Physical Therapy to evaluate and treat. Evaluate for home safety and equipment needs; Establish/upgrade home exercise program. Perform / instruct on therapeutic exercises, gait training, transfer training, and Range of Motion.  Occupational Therapy to evaluate and treat. Evaluate home environment for safety and equipment needs. Perform/Instruct on transfers, ADL training, ROM, and therapeutic exercises.  Aide to provide assistance with personal care, ADLs, and vital signs.    MISCELLANEOUS CARE:     Tunneled from line- care per Dialysis Center    WOUND CARE ORDERS    To abdominal wounds: Nursing to cleanse with normal saline and gauze. Pack wounds with aquacel ag and secure with bordered foam. Change MWF and PRN soiled    cleanse buttocks perineal area BID and PRN with wipes. Apply thin layer of Triad paste BID and PRN to the area.     Medications: Review discharge medications with patient and  family and provide education.      Current Discharge Medication List      START taking these medications    Details   cetirizine (ZYRTEC) 5 MG tablet Take 1 tablet (5 mg total) by mouth once daily.  Refills: 0      ciprofloxacin HCl (CIPRO) 500 MG tablet Take 1 tablet (500 mg total) by mouth once daily. for 6 days  Qty: 6 tablet, Refills: 0      dextromethorphan-guaifenesin  mg (MUCINEX DM)  mg per 12 hr tablet Take 2 tablets by mouth 2 (two) times daily. for 10 days  Qty: 40 tablet, Refills: 0      ferrous sulfate 325 (65 FE) MG EC tablet Take 1 tablet (325 mg total) by mouth 2 (two) times daily.  Refills: 0      insulin aspart U-100 (NOVOLOG) 100 unit/mL (3 mL) InPn pen Inject 0-5 Units into the skin before meals and at bedtime as needed (Hyperglycemia).  Qty: 6 mL, Refills: 2      magnesium oxide (MAG-OX) 400 mg (241.3 mg magnesium) tablet Take 1 tablet (400 mg total) by mouth 3 (three) times daily.  Refills: 0      midodrine (PROAMATINE) 10 MG tablet Take 1 tablet (10 mg total) by mouth 3 (three) times daily with meals.  Qty: 90 tablet, Refills: 2      senna-docusate 8.6-50 mg (PERICOLACE) 8.6-50 mg per tablet Take 1 tablet by mouth 2 (two) times daily as needed for Constipation.         CONTINUE these medications which have CHANGED    Details   gabapentin (NEURONTIN) 300 MG capsule Take 1 capsule (300 mg total) by mouth 2 (two) times daily.  Qty: 60 capsule, Refills: 2         CONTINUE these medications which have NOT CHANGED    Details   aspirin (ECOTRIN) 81 MG EC tablet Take 1 tablet (81 mg total) by mouth once daily.  Qty: 30 tablet, Refills: 12      atorvastatin (LIPITOR) 40 MG tablet Take 40 mg by mouth every evening.       calcitRIOL (ROCALTROL) 0.25 MCG Cap Take 0.5 mcg by mouth once daily.       calcium carbonate (CALCI-CHEW) 500 mg calcium (1,250 mg) chewable tablet Take 500 mg by mouth.      cinacalcet (SENSIPAR) 90 MG Tab Take 90 mg by mouth once daily.      clopidogrel (PLAVIX) 75 mg  tablet Take 1 tablet (75 mg total) by mouth once daily.  Qty: 30 tablet, Refills: 11      ONETOUCH VERIO Strp USE 1 STRIP ONCE DAILY IN VITRO  Refills: 3      pantoprazole (PROTONIX) 40 MG tablet Take 1 tablet (40 mg total) by mouth once daily.  Qty: 30 tablet, Refills: 11      sevelamer carbonate (RENVELA) 800 mg Tab Take 1 tablet (800 mg total) by mouth 3 (three) times daily with meals.  Qty: 90 tablet, Refills: 11      vitamin renal formula, B-complex-vitamin c-folic acid, (B COMPLEX-C-FOLIC ACID) 1 mg Cap Take 1 capsule by mouth once daily. 1 Capsule Oral Every day         STOP taking these medications       amLODIPine (NORVASC) 10 MG tablet Comments:   Reason for Stopping:         amLODIPine (NORVASC) 10 MG tablet Comments:   Reason for Stopping:         carvedilol (COREG) 3.125 MG tablet Comments:   Reason for Stopping:         epoetin adonay 10,000 unit/mL Soln 1 mL, epoetin adonay 20,000 unit/mL Soln 1 mL Comments:   Reason for Stopping:         ergocalciferol (ERGOCALCIFEROL) 50,000 unit Cap Comments:   Reason for Stopping:         insulin detemir U-100 (LEVEMIR FLEXTOUCH) 100 unit/mL (3 mL) SubQ InPn pen Comments:   Reason for Stopping:         irbesartan (AVAPRO) 75 MG tablet Comments:   Reason for Stopping:         lisinopril (PRINIVIL,ZESTRIL) 20 MG tablet Comments:   Reason for Stopping:         metoprolol succinate (TOPROL-XL) 25 MG 24 hr tablet Comments:   Reason for Stopping:         nateglinide (STARLIX) 120 MG tablet Comments:   Reason for Stopping:               I certify that this patient is confined to her home and needs intermittent skilled nursing care, physical therapy and occupational therapy.

## 2019-05-19 NOTE — PLAN OF CARE
Problem: Adult Inpatient Plan of Care  Goal: Plan of Care Review  Outcome: Ongoing (interventions implemented as appropriate)     05/19/19 9442   Plan of Care Review   Plan of Care Reviewed With patient     Patient Free of falls throughout shift. Bed in lowest position and call light in reach. Accucheck  HS and BG controlled with diet and PRN sliding scale insulin not needed. No complaints of pain. Dressings to abdomen  Clean dry and intact. Stool free from blood.

## 2019-05-19 NOTE — NURSING
Attempted to redraw CBC from this am that was clotted. Charge nurse tried twice, I tried once and nurse Al tried twice.

## 2019-05-19 NOTE — PROGRESS NOTES
[facility]                                  Skilled Nursing Facility                   Progress Note   Date of service: 05/19/2019    Admit Date: 4/22/2019  JESSY 5/19/2019  Principal Problem:  Problem with dialysis access   HPI obtained from patient interview and chart review     Chief Complaint:  Nurse unable to obtain specimen for cbc, patient is a difficult stick.    HPI: Mrs. Todd is 48 year old female with PMHx of of coronary artery disease, chronic systolic and diastolic heart failure (EF 25%), and ESRF on peritoneal dialysis since 2004 presented to [facility] on 2/21 with abdominal pain. She was initiated on treatment for peritonitis and had removal of peritoneal catheter. She developed active GI bleed and associated encephalopathy. Patient had emergent EGD 3/13 that showed erosive esophagitis with small ulcer and clotted blood in the gastric fundus. The following day she had repeat EGD that found one non-bleeding cratered gastric ulcer with clean ulcer base. She was then transfer to [facility] ICU for vascular surgery assistance in dialysis access placement. She had RUE graft 3/22 placed, but it clotted. She has poor vascular options and vascular surgery recommended that she continue with peritoneal access if possible. She is currently being dialyzed with permacath in left groin placed 3/28. She developed NSTEMI day after groin catheter placement. Cardiology recommended goal directed medical therapy. Patient was transferred here to [facility] on 4/4 for debility. She returned to [facility] ER on 4/6 after her dialysis access did not work. TPA was instilled in her permacath and she was able to undergo a full dialysis session.     Interval Hx: Nurse reports patient is a difficult stick and limited to one extremity due to vascular access on SESAR, unable to collect cbc. Patient evaluated she is asymptomatic. BP on low side suspect this is  multifactorial- anemia, fluid imbalance, ESRD. If unable to to obtain H/H  Retry in the morning. She is not bleeding from other places.     Past Medical History: Patient has a past medical history of Abnormal finding on Pap smear, HPV DNA positive (), Anemia associated with chronic renal failure, Blood type B+, Bulging discs - symptomatic , CAD (coronary artery disease), Cardiomyopathy (3/13/2019), Diabetes mellitus, type 2, ESRD (end stage renal disease) (2004), FSGS (focal segmental glomerulosclerosis), Hyperlipidemia, Hypertension (), Neuropathy, NSTEMI (non-ST elevated myocardial infarction) (3/29/2019), Obesity, Secondary hyperparathyroidism, renal, TIA (transient ischemic attack), and Uterine fibroid.    Past Surgical History: Patient has a past surgical history that includes Peritoneal catheter insertion; Umbilical hernia repair;  section, classic; Dialysis fistula creation; Cardiac catheterization; Incision and drainage of wound (Left, ); Open reduction and internal fixation (ORIF) of injury of hip (Left, 2018); Colonoscopy (N/A, 2018); Esophagogastroduodenoscopy (N/A, 3/13/2019); Esophagogastroduodenoscopy (N/A, 3/14/2019); Peritoneal catheter removal (N/A, 3/14/2019); Insertion of tunneled central venous hemodialysis catheter (N/A, 3/18/2019); Phlebography (Right, 3/20/2019); Placement of arteriovenous graft (Right, 3/22/2019); Insertion of tunneled central venous hemodialysis catheter (N/A, 3/28/2019); Left heart catheterization (Left, 4/15/2019); Insertion of intra-aortic balloon assist device (4/15/2019); and Coronary angiography (N/A, 4/15/2019).    Social History: Patient reports that she has never smoked. She has never used smokeless tobacco. She reports that she does not drink alcohol or use drugs.    Family History: family history includes Cancer in her maternal grandmother and paternal grandfather; Diabetes in her maternal aunt and paternal aunt.    Allergies:  Patient is allergic to clindamycin; flagyl [metronidazole hcl]; and metronidazole.    ROS  Constitutional: Negative for fever and malaise/fatigue.   Eyes: Negative for blurred vision, double vision and discharge.   Respiratory: + intermittent productive for cough. Neg for shortness of breath and wheezing.    Cardiovascular: Negative for chest pain, palpitations, claudication, leg swelling and PND.   Gastrointestinal: Negative for abdominal pain, constipation, diarrhea, nausea and vomiting. + increased abdominal girth  Genitourinary:  Pt is Anuric  Musculoskeletal:  + generalized weakness. Negative for back pain and myalgias.   Skin: Negative for itching and rash.   Neurological: Negative for dizziness, speech change, seizures, and headaches.   Psychiatric/Behavioral: Negative for depression. The patient is not nervous/anxious.      PEx  Temp:  [97.9 °F (36.6 °C)-98 °F (36.7 °C)]   Pulse:  []   Resp:  [16-19]   BP: ()/(52-80)   SpO2:  [98 %-99 %]   Body mass index is 31.7 kg/m².      Constitutional: Patient appears well-developed and in no distress.   HENT:   Head: Normocephalic and atraumatic.   Eyes: Pupils are equal, round, and reactive to light.   Neck: Normal range of motion. Neck supple.   Cardiovascular: Normal rate, regular rhythm and normal heart sounds.    Pulmonary/Chest: Effort normal and breath sounds are coarse, nasal congestion  Abdominal: Soft. Bowel sounds are normal.  Musculoskeletal: Normal range of motion. + generalized weakness  Neurological: Alert and oriented to person, place, and time.   Skin: Skin is warm and dry. + Lt femoral HD cath present, intact without any signs of infection, dressing changed today.  Abdominal wounds from previous PD catheter, unapproximated with packing and occlusive dressing.  Wound Care following.   Psychiatric: Normal mood and affect. Behavior is normal.     Assessment and Plan:    Upper respiratory infection  Rhinitis  Cough  - worsening  - 5/13  Continue Zyrtec, mucinex  - 5/13 chest x-ray initiated  - 5/14 Review of CXR revealed no acute cardiopulmonary process. Findings improved compared to chest x-ray dated 04/18. T-max 100° F, blood culture obtained from left femoral tunneled catheter. Repeat labs ordered for the a.m.  - 5/15 WBC trending down to 11.83 since antibiotic initiation, continue to monitor.  - 5/16 initiated duo nebs q.6 hours, discontinued Mucinex initiated Mucinex DM 2 tabs 60/300 mg.   - 5/17 slight symptom improvement    Anemia in chronic kidney disease, on chronic dialysis  -Continue Epo per neph and protonix  -5/2 H&H at 7.6/25.3, continue Epo  - 5/6 H&H improved to 7.8/25   - 5/13 H&H decreased to 7/22.9.   - 5/15 H&H at 6.9/23.4, initiated ferrous sulfate EC tablet 325 mg daily. Initiated CBC and Type & Screen for 05/17. Initiated heparin, porcine (PF) injection 1,000 Units for hep lock of dialysis port p.r.n.   - 5/17 initiated increased to ferrous sulfate to b.i.d. Dosing. H&H low but stable at 6.9/23; will recheck on 5/19.     CONTINUE    ESRD (end stage renal disease) on dialysis  - 5/16 decreased cinacalcet to 60 mg daily; patient's phos 3.5    ESRD (end stage renal disease) on dialysis  peritonitis due to peritoneal dialysis catheter  Leukocytosis  -Previously PD patient; admitted 2/21 at [facility] for peritonitis, had PD catheter removed for recurrent peritonitis & transitioned to HD. Abx complete for peritonitis  Bilateral IJ tunneled catheters were attempted without success due to thrombus. Now has a temporary dialysis catheter in her left fem that was placed 3/28.  Prior to permacath placement, she had a next day graft placed by Dr. Leger on 3/22, but it was never used and was clotted 1 day post-op. Patient refused to have the graft declotted, so the permacatch was placed at that time.  -4/25 HD TTS, HD today  -4/30 abd distention is greatly improved with HD treatments.  -5/2  WBC at 12.89, continue to monitor. Bun/Cr at  25/5.8, continue dialysis TTS.  - 5/6 WBC increased to  14.  Patient has been afebrile and does not appear to look ill.  She is having regular bowel movements.  She does have a slight nonproductive intermittent cough.  She is anuric. pts fem line did not have proper dressing. Charge nurse replaced dressing with proper central line dressing. Will repeat CBC tomorrow, 5/7   - Patient went to general surgery appointment last Friday on 05/03.  She will return to their clinic in 2 weeks to see about a PD replacement catheter.  She will need cardiac clearance and for her abdomen wounds to heal.   - 5/13 WBC spike to 20.20 and mild increase in temperature to 99.1, Procalcitonin positive at 1.18. Discharge postponed and infectious workup initiated. Blood cx x 2, Bladder scan, and straight cath for UA with reflex initiated.   - 5/13 CR at 4.8, continue HD TTS.   - 5/15 Initiated heparin, porcine (PF) injection 1,000 Units for hep lock of dialysis port p.r.n. WBC trending down to 11.83 since antibiotic initiation, continue to monitor.  - 5/16 patient remains with unapproximated abdominal wound.  Ambulatory referral to outpatient wound care placed    Hypomagnesemia  -4/29 initiated mag oxide tab 400 mg daily  -5/2 increased mag oxide to 400 mg bid  - 5/6 improved to 1.6 continue Mag-Ox  - 5/13 increased magnesium tablet 400 mg t.i.d.    Hemorrhoids  -5/2 initiated phenyleph-shark radha oil-mo-pet ointment 1 applicator to be applied to rectum b.i.d.  - 5/6 stable, continues to use medication    high risk for imbalanced nutrition  -5/1 Continued 2000 diabetic diet. Increased fluid restriction to 1500 ml/day. Discontinued renal restriction due to food preference and the need for increase nutrional intake. Goal intake is 85% Of EEN    Debility  - Continue with PT/OT for gait training and strengthening and restoration of ADL's   - Encourage mobility, OOB in chair, and early ambulation as appropriate  - Fall precautions   - Monitor  for bowel and bladder dysfunction  - Monitor for and prevent skin breakdown and pressure ulcers  - Continue DVT prophylaxis with heparin   -4/30 Patient is reporting that she does not want to take heparin due to her haveing an acute anemic episode requiring transition to ED in the past. Patient educated on the importance of dvt prophylaxis with decrease mobility, she has decided to continue to refuse heparin.     NSTEMI (non-ST elevated myocardial infarction)  Acute systolic heart failure  -s/p ACS protocol (DAPT, statin, heparin drip stopped)/NSTEMI   -CT surgery/CABG eval revealed, not a candidate due to comorbidities.   -HTS consult as outpatient to discuss advanced options  -LV EF of 25%  -4/25 Continue HD as outpatient for volume removal  -Continue Asa, statin, and plavix daily  -Discontinued Coreg   -Discontinue irbesartan  -F/u with cards, Ndandu    Pressure injury of sacral region, stage 2  Abdominal wounds  -Wound care consulted  - continue care for abdominal wounds- cleanse with normal saline and gauze pack wound with Aquacel Ag and secure with foam border to change on Monday Wednesday and Friday or as needed if soiled  - continue wound care for pressure injury of sacral area- nursing to cleanse buttocks perineal area b.i.d. and as needed with wipes apply a thin layer of triad paste b.i.d. and as needed.  No diapers.  Turn q.2 hours.     DM II with neuropathy   - Continue Gabapentin 300 mg bid    Future Appointments   Date Time Provider Department Center   5/30/2019  6:45 PM Sung Alonso MD [facility] Manfred Kennedy NP   Date of service: 5/19/19

## 2019-05-19 NOTE — PT/OT/SLP PROGRESS
Occupational Therapy  Treatment    Jenni Todd   MRN: [id number]   Admitting Diagnosis: Problem with dialysis access    Billable Minutes:  Therapeutic Exercise 23    General Precautions: Standard, diabetic, fall  Orthopedic Precautions: N/A  Braces: N/A    Spiritual, Cultural Beliefs, [employer] Practices, Values that Affect Care: no    Subjective:  Communicated with PCT prior to session.     Objective:    Pt seen for BUE strengthening, standing balance/ tolerance exercises and functional transfer training as it relates to transitioning ADL performance from w/c level to RW level.    Patient left up in chair with call button in reach    ASSESSMENT:  Jenni Todd is a 48 y.o. female with a medical diagnosis of Problem with dialysis access who presents significantly weak with impaired ability to safely perform her mobility and ADL's as PLOF. She would benefit from ongoing skilled OT to maximize her safe and feasible performance of such.    Rehab identified problem list/impairments: impaired endurance, impaired self care skills, impaired functional mobilty, impaired cardiopulmonary response to activity    Rehab potential is good    Activity tolerance: Good    Discharge recommendations: home health OT(may require S initially)     Barriers to discharge: Decreased caregiver support     Equipment recommendations: commode     GOALS:   Multidisciplinary Problems     Occupational Therapy Goals        Problem: Occupational Therapy Goal    Goal Priority Disciplines Outcome Interventions   Occupational Therapy Goal     OT, PT/OT Ongoing (interventions implemented as appropriate)    Description:  Goals to be met by: 05-06-19 Goals extended to 05-13-19    Patient will increase functional independence with ADLs by performing:    UE Dressing with Mod I  MET  LE Dressing with Mod I.  REVISED LE dressing with SBA.  Grooming while seated with Mod I .  MET  Toileting from bedside commode with Mod I for hygiene and clothing  management. REVISED  SBA with toileting with A/D to stand.  Bathing from  edge of bed with SBA.  Supine to sit with Modified [place].  Stand pivot transfers with Mod I .  Toilet transfer to bedside commode with Mod I.  Pt. To be I with HEP for BUE to improve endurance level                         Plan:  Patient to be seen 5 x/week to address the above listed problems via self-care/home management, therapeutic activities, therapeutic exercises  Plan of Care expires: 05/23/19  Plan of Care reviewed with: patient    Wendy Basilio, OT  05/06/2019

## 2019-05-19 NOTE — PT/OT/SLP PROGRESS
Physical Therapy  Treatment /discharge    Jenni Todd   MRN: [id number]   Admitting Diagnosis: Problem with dialysis access    PT Received On: 05/19/19          Billable Minutes:  Gait Training 15, Therapeutic Activity 15 and Therapeutic Exercise 25    Treatment Type: Treatment  PT/PTA: PT     PTA Visit Number: 0       General Precautions: Standard, diabetic, fall  Orthopedic Precautions: N/A   Braces: N/A    Spiritual, Cultural Beliefs, Jewish Practices, Values that Affect Care: no    Subjective:  Communicated with patient prior to session.  Agreeable to session; reports feels prepared for d/c home tomorrow as planned    Pain/Comfort  Pain Rating 1: 0/10  Location 1: (LLE pain w/ SAQ that resolves w/ repositioning)  Pain Rating Post-Intervention 1: 0/10    Objective:  Patient found seated in w/c with       AM-PAC 6 CLICK MOBILITY  Total Score:17    Bed Mobility:  Sit>Supine:on mat w/ mod(I) w/ wedge and pillows   Supine>Sit: w/ mod(I) on mat w/ patient using rollator next to bed as a bed rail (she reports this is how she has things set up at home)  Rolls on mat w/ mod(I)  Transfers:  Sit<>Stand: to/from w/c w/ Rollator and CGA for safety x3 trials. To/from mat w/ rollator and SBA  Stand Pivot Transfer: mat>w/c w/ rollator and SBA  Good technique. Manages rollator brakes    Gait:  Amb w/ rollator and CGA for safety 58 feet. Second person providing CGa/SBA for safety and third person following closely w/ w/c. Patient w/o LOB or any dizziness. Distance limited by fatigue  Blood pressure monitored.  Prior to gait, sitting 87/55.  After LE exercises in sitting 93/63  After walking, sitting in w/c 100/65.     Advanced Gait:  Stairs: not performed  Curb Step: not performed    Wheelchair Mobility:  Patient propels w/c w/ BUEs and mod(I) 100 feet w/ occ rest breaks, including 2 turns. Distance limited by fatigue     Therex:  Quad sets,   Glute sets,   Ankle  Pumps,   hip abduction/adduction,  heelslides,   SAQ,   LAQ    Hip flexion  x20 reps w/ assist as needed for HEP    Balance:  Stands w/ rollator and SBA w/o LOB, sits EOM w/o LOB    Additional Treatment:  Educated in gait and trf technique, safety, recommend assistance for mobility for safety.    Patient left up in chair with call button in reach. And lunch set up    Assessment:  Jenni Todd is a 48 y.o. female with a medical diagnosis of Problem with dialysis access.  Patient is discharging home tomorrow after long hospital stay. Patient participated well today, able to walk w/ rollator and perform all transfers. Her family has completed family training. .    Rehab identified problem list/impairments: weakness, impaired endurance, impaired sensation, impaired self care skills, impaired functional mobilty, gait instability, impaired balance, decreased coordination, decreased lower extremity function, impaired cardiopulmonary response to activity    Rehab potential is good.    Activity tolerance: Good    Discharge recommendations: home health PT     Barriers to discharge: Decreased caregiver support    Equipment recommendations: commode     GOALS:   Multidisciplinary Problems     Physical Therapy Goals        Problem: Physical Therapy Goal    Goal Priority Disciplines Outcome Goal Variances Interventions   Physical Therapy Goal     PT, PT/OT Ongoing (interventions implemented as appropriate)     Description:  Goals to be met by: [id number]    Patient will increase functional independence with mobility by performin. Supine to sit with Modified [employer] = met 2019  2. Sit to supine with Modified [employer] = met 2019  3. Sit to stand transfer with SBA. =Met   4. Bed to chair transfer with SBA using Rolling Walker or Rollator.= met  5. Gait  x 30 feet with SBA using Rolling Walker or Rollator. =Met   6. Wheelchair propulsion x100 feet with Supervision using bilateral upper extremities.= Met   7. Ascend/Descend 4 inch curb step with Stand-by Assistance  using Rolling Walker or Rollator = not met  8. Stand for 2 minutes with Stand-by Assistance using Rolling Walker or Rollator =not met  9. Lower extremity exercise program x30 reps per handout, with assistance as needed = met 5/19/2019                               PLAN:    Patient to be seen 5 x/week  to address the above listed problems via gait training, therapeutic activities, therapeutic exercises  Plan of Care expires: 05/23/19  Plan of Care reviewed with: patient    Katarzyna Rangel, PT  05/19/2019

## 2019-05-20 NOTE — PROGRESS NOTES
Pt. D/c to home accompanied by sister.d/c instructions with f/u care explained to pt. and pt. verbalized understanding.insulin teaching done with return demonstration done.copy of d/c instruction sheet given to pt.pt. escorted to front entrance and assisted into family vehicle.pt. personal belongings accompanied pt.

## 2019-05-20 NOTE — HPI
47 yo with history of coronary artery disease, chronic systolic and diastolic heart failure (EF 25%), and ESRF on peritoneal dialysis since 2004 presented to [facility] on 2/21 with abdominal pain. She was initiated on treatment for peritonitis and had removal of peritoneal catheter. She developed active GI bleed and associated encephalopathy. Patient had emergent EGD 3/13 that showed erosive esophagitis with small ulcer and clotted blood in the gastric fundus. The following day she had repeat EGD that found one non-bleeding cratered gastric ulcer with clean ulcer base. She was then transfer to [facility] ICU for vascular surgery assistance in dialysis access placement. She had RUE graft 3/22 placed, but it clotted. She has poor vascular options and vascular surgery recommended that she continue with peritoneal access if possible. She is currently being dialyzed with permacath in left groin placed 3/28. She developed NSTEMI day after groin catheter placement. Cardiology recommended goal directed medical therapy. Patient was transferred here to [facility] on 4/4 for debility. She returned to [facility] ER on 4/6 after her dialysis access did not work. TPA was instilled in her permacath and she was able to undergo a full dialysis session. Patient complains of abdominal distension that has been attributed to extra fluid. She has had some dyspnea on exertion that is somewhat worse in the last few weeks, but denies cough or wheezing. She denies chest pain, palpitations, nausea, or vomiting.

## 2019-05-20 NOTE — PLAN OF CARE
[facility]    HOME HEALTH ORDERS  FACE TO FACE ENCOUNTER    Patient Name: Jenni Todd  YOB: 1970    PCP: Michael Tan Iii, MD   PCP Address: [address] / [place] [address]  PCP Phone Number: [phone]  PCP Fax: [phone]    Encounter Date: 05/20/2019    Admit to Home Health    Diagnoses:  Active Hospital Problems    Diagnosis  POA    *Problem with dialysis access [T82.898A]  Yes    Upper respiratory infection [J06.9]  No    Rhinitis [J31.0]  No    At high risk for imbalanced nutrition [Z91.89]  Not Applicable    Permanent central venous catheter in place [Z95.828]  Not Applicable    Hypervolemia [E87.70]  Yes    Acute on chronic combined systolic and diastolic heart failure [I50.43]  Yes    Chronic right-sided heart failure [I50.812]  Yes    Moderate to severe pulmonary hypertension [I27.20]  Yes    Diabetic polyneuropathy associated with type 2 diabetes mellitus [E11.42]  Yes    Multiple wounds [T07.XXXA]  Yes    Pressure injury of sacral region, stage 2 [L89.152]  Yes    NSTEMI (non-ST elevated myocardial infarction) [I21.4]  Yes    Acute gastric ulcer with hemorrhage [K25.0]  Yes    Intra-dialytic hypotension [I95.3]  Yes    Peritonitis associated with peritoneal dialysis [T85.71XA]  Yes    Acute blood loss anemia [D62]  Yes    Anemia in chronic kidney disease, on chronic dialysis [N18.6, D63.1, Z99.2]  Not Applicable     on Epogen      Type 2 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 and hypertension [E11.22, I12.0, N18.5]  Yes    ESRD (end stage renal disease) on PD ( initiated dialysis 04/20/2004) [N18.6]  Yes     Nightly PD        Resolved Hospital Problems   No resolved problems to display.       Future Appointments   Date Time Provider Department Center   5/30/2019  6:45 PM Sung Alonso MD [facility]     Follow-up Information     Michael Tan Iii, MD In 1 week.    Specialty:  Family  Medicine  Contact information:  [address]  [address]  [place] [address]  [phone]             [facility] - Nephrology In 2 weeks.    Specialty:  Nephrology  Contact information:  [facility]  [facility] [address] [phone]  Additional information:  [facility] - 5th Floor                   I have seen and examined this patient face to face today. My clinical findings that support the need for the home health skilled services and home bound status are the following:  Weakness/numbness causing balance and gait disturbance due to Infection, Weakness/Debility and Anemia making it taxing to leave home.  Requiring assistive device to leave home due to unsteady gait caused by  Infection, Weakness/Debility and Anemia.    Allergies:  Review of patient's allergies indicates:   Allergen Reactions    Clindamycin Diarrhea    Flagyl [metronidazole hcl]     Metronidazole        Diet: diabetic diet: 2000 calorie, renal diet and fluid restriction: 1500       Activities: activity as tolerated    Nursing:   SN to complete comprehensive assessment including routine vital signs. Instruct on disease process and s/s of complications to report to MD. Review/verify medication list sent home with the patient at time of discharge  and instruct patient/caregiver as needed. Frequency may be adjusted depending on start of care date.    Notify MD if SBP > 160 or < 90; DBP > 90 or < 50; HR > 120 or < 50; Temp > 101; Other:         CONSULTS:    Physical Therapy to evaluate and treat. Evaluate for home safety and equipment needs; Establish/upgrade home exercise program. Perform / instruct on therapeutic exercises, gait training, transfer training, and Range of Motion.  Occupational Therapy to evaluate and treat. Evaluate home environment for safety and equipment needs. Perform/Instruct on transfers, ADL training, ROM, and therapeutic exercises.  Aide to provide assistance with personal care, ADLs, and vital  signs.    Wound to the upper abdomen approx  0.5 x 0.5 1.5 cm. Base is difficult to visualize. There is some skin growing into the wound but a small area in middle remains. No odor or purulence noted. Wound cleansed with NS and packed with Aquacel ag and secured with border foam.              Wound to the lower abdomen approx 0.6 x 1.8 x 3.0 cm. Base difficult to visualize. No odor or purulence noted. Wound cleansed with NS and packed with Aquacel ag and secured with border foam.               Recommend:  Follow up with outpatient wound care  HH 3 x week for dressing change, aquacel ag packing to wounds.   Medications: Review discharge medications with patient and family and provide education.      Current Discharge Medication List      START taking these medications    Details   cetirizine (ZYRTEC) 5 MG tablet Take 1 tablet (5 mg total) by mouth once daily.  Refills: 0      ciprofloxacin HCl (CIPRO) 500 MG tablet Take 1 tablet (500 mg total) by mouth once daily. for 6 days  Qty: 6 tablet, Refills: 0      dextromethorphan-guaifenesin  mg (MUCINEX DM)  mg per 12 hr tablet Take 2 tablets by mouth 2 (two) times daily. for 10 days  Qty: 40 tablet, Refills: 0      ferrous sulfate 325 (65 FE) MG EC tablet Take 1 tablet (325 mg total) by mouth 2 (two) times daily.  Refills: 0      insulin aspart U-100 (NOVOLOG) 100 unit/mL (3 mL) InPn pen Inject 0-5 Units into the skin before meals and at bedtime as needed (Hyperglycemia).  Qty: 6 mL, Refills: 2      magnesium oxide (MAG-OX) 400 mg (241.3 mg magnesium) tablet Take 1 tablet (400 mg total) by mouth 3 (three) times daily.  Refills: 0      midodrine (PROAMATINE) 10 MG tablet Take 1 tablet (10 mg total) by mouth 3 (three) times daily with meals.  Qty: 90 tablet, Refills: 2      senna-docusate 8.6-50 mg (PERICOLACE) 8.6-50 mg per tablet Take 1 tablet by mouth 2 (two) times daily as needed for Constipation.         CONTINUE these medications which have CHANGED     Details   gabapentin (NEURONTIN) 300 MG capsule Take 1 capsule (300 mg total) by mouth 2 (two) times daily.  Qty: 60 capsule, Refills: 2         CONTINUE these medications which have NOT CHANGED    Details   aspirin (ECOTRIN) 81 MG EC tablet Take 1 tablet (81 mg total) by mouth once daily.  Qty: 30 tablet, Refills: 12      atorvastatin (LIPITOR) 40 MG tablet Take 40 mg by mouth every evening.       calcitRIOL (ROCALTROL) 0.25 MCG Cap Take 0.5 mcg by mouth once daily.       calcium carbonate (CALCI-CHEW) 500 mg calcium (1,250 mg) chewable tablet Take 500 mg by mouth.      cinacalcet (SENSIPAR) 90 MG Tab Take 90 mg by mouth once daily.      clopidogrel (PLAVIX) 75 mg tablet Take 1 tablet (75 mg total) by mouth once daily.  Qty: 30 tablet, Refills: 11      ONETOUCH VERIO Strp USE 1 STRIP ONCE DAILY IN VITRO  Refills: 3      pantoprazole (PROTONIX) 40 MG tablet Take 1 tablet (40 mg total) by mouth once daily.  Qty: 30 tablet, Refills: 11      sevelamer carbonate (RENVELA) 800 mg Tab Take 1 tablet (800 mg total) by mouth 3 (three) times daily with meals.  Qty: 90 tablet, Refills: 11      vitamin renal formula, B-complex-vitamin c-folic acid, (B COMPLEX-C-FOLIC ACID) 1 mg Cap Take 1 capsule by mouth once daily. 1 Capsule Oral Every day         STOP taking these medications       amLODIPine (NORVASC) 10 MG tablet Comments:   Reason for Stopping:         amLODIPine (NORVASC) 10 MG tablet Comments:   Reason for Stopping:         carvedilol (COREG) 3.125 MG tablet Comments:   Reason for Stopping:         epoetin adonay 10,000 unit/mL Soln 1 mL, epoetin adonay 20,000 unit/mL Soln 1 mL Comments:   Reason for Stopping:         ergocalciferol (ERGOCALCIFEROL) 50,000 unit Cap Comments:   Reason for Stopping:         insulin detemir U-100 (LEVEMIR FLEXTOUCH) 100 unit/mL (3 mL) SubQ InPn pen Comments:   Reason for Stopping:         irbesartan (AVAPRO) 75 MG tablet Comments:   Reason for Stopping:         lisinopril (PRINIVIL,ZESTRIL) 20  MG tablet Comments:   Reason for Stopping:         metoprolol succinate (TOPROL-XL) 25 MG 24 hr tablet Comments:   Reason for Stopping:         nateglinide (STARLIX) 120 MG tablet Comments:   Reason for Stopping:               I certify that this patient is confined to her home and needs intermittent skilled nursing care, physical therapy and occupational therapy.

## 2019-05-20 NOTE — DISCHARGE SUMMARY
[facility] Medicine  Discharge Summary      Patient Name: Jenni Todd  MRN: [id number]  Admission Date: 4/22/2019  Hospital Length of Stay: 28 days  Discharge Date and Time:  05/20/2019 11:18 AM  Attending Physician: Camryn Dan MD   Discharging Provider: Cleveland Monterroso NP  Primary Care Provider: Michael Tan Iii, MD      HPI:   49 yo with history of coronary artery disease, chronic systolic and diastolic heart failure (EF 25%), and ESRF on peritoneal dialysis since 2004 presented to [facility] on 2/21 with abdominal pain. She was initiated on treatment for peritonitis and had removal of peritoneal catheter. She developed active GI bleed and associated encephalopathy. Patient had emergent EGD 3/13 that showed erosive esophagitis with small ulcer and clotted blood in the gastric fundus. The following day she had repeat EGD that found one non-bleeding cratered gastric ulcer with clean ulcer base. She was then transfer to [facility] ICU for vascular surgery assistance in dialysis access placement. She had RUE graft 3/22 placed, but it clotted. She has poor vascular options and vascular surgery recommended that she continue with peritoneal access if possible. She is currently being dialyzed with permacath in left groin placed 3/28. She developed NSTEMI day after groin catheter placement. Cardiology recommended goal directed medical therapy. Patient was transferred here to [facility] on 4/4 for debility. She returned to [facility] ER on 4/6 after her dialysis access did not work. TPA was instilled in her permacath and she was able to undergo a full dialysis session. Patient complains of abdominal distension that has been attributed to extra fluid. She has had some dyspnea on exertion that is somewhat worse in the last few weeks, but denies cough or wheezing. She denies chest pain, palpitations, nausea, or vomiting.      * No surgery found  *      Hospital Course:   Patient progressed well with PT and OT. Patient had no significant events during their stay at SNF. Home health was set up. DME was ordered if needed. Follow up appointment have been set up. All prescriptions and discharge instructions were given to patient.      ROS  Constitutional: Negative for fever and malaise/fatigue.   Eyes: Negative for blurred vision, double vision and discharge.   Respiratory: + intermittent nonproductive for cough. Neg for shortness of breath and wheezing.    Cardiovascular: Negative for chest pain, palpitations, claudication, leg swelling and PND.   Gastrointestinal: Negative for abdominal pain, constipation, diarrhea, nausea and vomiting. + increased abdominal girth  Genitourinary:  Pt is Anuric  Musculoskeletal:  + generalized weakness. Negative for back pain and myalgias.   Skin: Negative for itching and rash.   Neurological: Negative for dizziness, speech change, seizures, and headaches.   Psychiatric/Behavioral: Negative for depression. The patient is not nervous/anxious.       PEx  Temp:  [98.7 °F (37.1 °C)-100.7 °F (38.2 °C)]   Pulse:  [100-115]   Resp:  [17-20]   BP: ()/(39-67)   SpO2:  [95 %-99 %]   Body mass index is 32.91 kg/m².      Constitutional: Patient appears well-developed and in no distress.   HENT:   Head: Normocephalic and atraumatic.   Eyes: Pupils are equal, round, and reactive to light.   Neck: Normal range of motion. Neck supple.   Cardiovascular: Normal rate, regular rhythm and normal heart sounds.    Pulmonary/Chest: Effort normal and breath sounds are clear, nasal congestion  Abdominal: Soft. Bowel sounds are normal.  Musculoskeletal: Normal range of motion. + generalized weakness  Neurological: Alert and oriented to person, place, and time.   Skin: Skin is warm and dry. + Lt femoral HD cath present, intact without any signs of infection.  Abdominal wounds from previous PD catheter.  Psychiatric: Normal mood and affect. Behavior  is normal.        Consults:   Consults (From admission, onward)        Status Ordering Provider     Inpatient consult to Registered Dietitian/Nutritionist  Once     Provider:  (Not yet assigned)    YARA Leblanc new Assessment & Plan notes have been filed under this hospital service since the last note was generated.  Service: Hospital Medicine    Final Active Diagnoses:    Diagnosis Date Noted POA    PRINCIPAL PROBLEM:  Problem with dialysis access [T82.898A] 04/25/2019 Yes    Upper respiratory infection [J06.9] 05/13/2019 No    Rhinitis [J31.0] 05/13/2019 No    At high risk for imbalanced nutrition [Z91.89] 05/01/2019 Not Applicable    Permanent central venous catheter in place [Z95.828] 04/25/2019 Not Applicable    Hypervolemia [E87.70] 04/25/2019 Yes    Acute on chronic combined systolic and diastolic heart failure [I50.43] 04/25/2019 Yes    Chronic right-sided heart failure [I50.812] 04/25/2019 Yes    Moderate to severe pulmonary hypertension [I27.20] 04/25/2019 Yes    Diabetic polyneuropathy associated with type 2 diabetes mellitus [E11.42] 04/25/2019 Yes    Multiple wounds [T07.XXXA] 04/15/2019 Yes    Pressure injury of sacral region, stage 2 [L89.152] 04/15/2019 Yes    NSTEMI (non-ST elevated myocardial infarction) [I21.4] 03/29/2019 Yes    Acute gastric ulcer with hemorrhage [K25.0] 03/18/2019 Yes    Intra-dialytic hypotension [I95.3]  Yes    Peritonitis associated with peritoneal dialysis [T85.71XA] 02/21/2019 Yes    Acute blood loss anemia [D62] 09/23/2018 Yes    Anemia in chronic kidney disease, on chronic dialysis [N18.6, D63.1, Z99.2]  Not Applicable    Type 2 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 and hypertension [E11.22, I12.0, N18.5]  Yes    ESRD (end stage renal disease) on PD ( initiated dialysis 04/20/2004) [N18.6] 04/20/2004 Yes      Problems Resolved During this Admission:       Discharged Condition: good    Disposition: Home or Self  "Care    Follow Up:  Follow-up Information     Michael Tan Iii, MD In 1 week.    Specialty:  Family Medicine  Contact information:  [address]  [address]  [place] [address]  [phone]             [facility] - Nephrology In 2 weeks.    Specialty:  Nephrology  Contact information:  [address]  [facility] [address] [phone]  Additional information:  [facility] - 5th Floor               Patient Instructions:      3 IN 1 COMMODE FOR HOME USE     Order Specific Question Answer Comments   Type: Standard    Height: 5' 7" (1.702 m)    Weight: 94.9 kg (209 lb 3.5 oz)    Does patient have medical equipment at home? grab bar    Does patient have medical equipment at home? shower chair    Does patient have medical equipment at home? rollator    Does patient have medical equipment at home? wheelchair    Does patient have medical equipment at home? power chair    Does patient have medical equipment at home? walker, rolling    Length of need (1-99 months): 99      Ambulatory consult to General Surgery   Referral Priority: Routine Referral Type: Consultation   Referral Reason: Specialty Services Required   Referred to Provider: PETER MELISSA   Number of Visits Requested: 1     Ambulatory Referral to Wound Clinic   Referral Priority: Routine Referral Type: Consultation   Referral Reason: Specialty Services Required   Requested Specialty: Wound Care   Number of Visits Requested: 1     No driving until:   Order Comments: Cleared by PCP     Notify your health care provider if you experience any of the following:  temperature >100.4     Notify your health care provider if you experience any of the following:  persistent nausea and vomiting or diarrhea     Notify your health care provider if you experience any of the following:  severe uncontrolled pain     Notify your health care provider if you experience any of the following:  redness, tenderness, or signs of infection (pain, swelling, redness, " odor or green/yellow discharge around incision site)     Notify your health care provider if you experience any of the following:  difficulty breathing or increased cough     Notify your health care provider if you experience any of the following:  persistent dizziness, light-headedness, or visual disturbances     Notify your health care provider if you experience any of the following:  increased confusion or weakness     No driving until:   Order Comments: Until cleared by Primary Care MD     Notify your health care provider if you experience any of the following:  temperature >100.4     Notify your health care provider if you experience any of the following:  persistent nausea and vomiting or diarrhea     Notify your health care provider if you experience any of the following:  severe uncontrolled pain     Notify your health care provider if you experience any of the following:  redness, tenderness, or signs of infection (pain, swelling, redness, odor or green/yellow discharge around incision site)     Notify your health care provider if you experience any of the following:  difficulty breathing or increased cough     Notify your health care provider if you experience any of the following:  severe persistent headache     Notify your health care provider if you experience any of the following:  persistent dizziness, light-headedness, or visual disturbances     Notify your health care provider if you experience any of the following:  increased confusion or weakness     Activity as tolerated     Activity as tolerated       Significant Diagnostic Studies: Labs: All labs within the past 24 hours have been reviewed    Pending Diagnostic Studies:     None         Medications:  Reconciled Home Medications:      Medication List      START taking these medications    cetirizine 5 MG tablet  Commonly known as:  ZYRTEC  Take 1 tablet (5 mg total) by mouth once daily.     ciprofloxacin HCl 500 MG tablet  Commonly known as:   CIPRO  Take 1 tablet (500 mg total) by mouth once daily. for 6 days     dextromethorphan-guaifenesin  mg  mg per 12 hr tablet  Commonly known as:  MUCINEX DM  Take 2 tablets by mouth 2 (two) times daily. for 10 days     ferrous sulfate 325 (65 FE) MG EC tablet  Take 1 tablet (325 mg total) by mouth 2 (two) times daily.     insulin aspart U-100 100 unit/mL (3 mL) Inpn pen  Commonly known as:  NovoLOG  Inject 0-5 Units into the skin before meals and at bedtime as needed (Hyperglycemia).     magnesium oxide 400 mg (241.3 mg magnesium) tablet  Commonly known as:  MAG-OX  Take 1 tablet (400 mg total) by mouth 3 (three) times daily.     midodrine 10 MG tablet  Commonly known as:  PROAMATINE  Take 1 tablet (10 mg total) by mouth 3 (three) times daily with meals.     senna-docusate 8.6-50 mg 8.6-50 mg per tablet  Commonly known as:  PERICOLACE  Take 1 tablet by mouth 2 (two) times daily as needed for Constipation.        CHANGE how you take these medications    gabapentin 300 MG capsule  Commonly known as:  NEURONTIN  Take 1 capsule (300 mg total) by mouth 2 (two) times daily.  What changed:    · medication strength  · how much to take  · how to take this  · when to take this        CONTINUE taking these medications    aspirin 81 MG EC tablet  Commonly known as:  ECOTRIN  Take 1 tablet (81 mg total) by mouth once daily.     atorvastatin 40 MG tablet  Commonly known as:  LIPITOR  Take 40 mg by mouth every evening.     CALCI-CHEW 500 mg calcium (1,250 mg) chewable tablet  Generic drug:  calcium carbonate  Take 500 mg by mouth.     calcitRIOL 0.25 MCG Cap  Commonly known as:  ROCALTROL  Take 0.5 mcg by mouth once daily.     cinacalcet 90 MG Tab  Commonly known as:  SENSIPAR  Take 90 mg by mouth once daily.     clopidogrel 75 mg tablet  Commonly known as:  PLAVIX  Take 1 tablet (75 mg total) by mouth once daily.     NEPHROCAPS 1 mg Cap  Generic drug:  vitamin renal formula (B-complex-vitamin c-folic acid)  Take 1  capsule by mouth once daily. 1 Capsule Oral Every day     ONETOUCH VERIO Strp  Generic drug:  blood sugar diagnostic  USE 1 STRIP ONCE DAILY IN VITRO     pantoprazole 40 MG tablet  Commonly known as:  PROTONIX  Take 1 tablet (40 mg total) by mouth once daily.     sevelamer carbonate 800 mg Tab  Commonly known as:  RENVELA  Take 1 tablet (800 mg total) by mouth 3 (three) times daily with meals.        STOP taking these medications    amLODIPine 10 MG tablet  Commonly known as:  NORVASC     carvedilol 3.125 MG tablet  Commonly known as:  COREG     epoetin adonay 10,000 unit/mL Soln 1 mL, epoetin adonay 20,000 unit/mL Soln 1 mL     ergocalciferol 50,000 unit Cap  Commonly known as:  ERGOCALCIFEROL     insulin detemir U-100 100 unit/mL (3 mL) Inpn pen  Commonly known as:  LEVEMIR FLEXTOUCH     irbesartan 75 MG tablet  Commonly known as:  AVAPRO     lisinopril 20 MG tablet  Commonly known as:  PRINIVIL,ZESTRIL     metoprolol succinate 25 MG 24 hr tablet  Commonly known as:  TOPROL-XL     STARLIX 120 MG tablet  Generic drug:  nateglinide            Indwelling Lines/Drains at time of discharge:   Lines/Drains/Airways     Central Venous Catheter Line                 Hemodialysis Catheter 03/28/19 1528 left femoral 52 days          Drain                 Hemodialysis AV Fistula Left upper arm -- days         Hemodialysis AV Graft Right upper arm -- days              Recommend:  Follow up with outpatient wound care   3 x week for dressing change, aquacel ag packing to wounds.      Time spent on the discharge of patient: 40 minutes  Patient was seen and examined on the date of discharge and determined to be suitable for discharge.         Cleveland Monterroso NP  Department of Hospital Medicine  [facility]

## 2019-05-20 NOTE — PLAN OF CARE
Problem: Adult Inpatient Plan of Care  Goal: Plan of Care Review  Outcome: Ongoing (interventions implemented as appropriate)     05/20/19 0530   Plan of Care Review   Plan of Care Reviewed With patient   Patient Free of falls throughout shift. Bed in lowest position and call light in reach. Accucheck  HS and BG controlled with diet and PRN sliding scale insulin not required. No complaints of pain. Dressings to abdomen  Clean dry and intact.

## 2019-05-20 NOTE — PLAN OF CARE
Problem: Fall Injury Risk  Goal: Absence of Fall and Fall-Related Injury  Outcome: Outcome(s) achieved Date Met: 05/20/19  Pt. Had no falls.

## 2019-05-20 NOTE — NURSING
Wound care follow up for wounds to abdomen.     Patient was not discharged last week. Patient likely to discharge this week.     Wounds continue to improve slowly.     Wound to the upper abdomen approx  0.5 x 0.5 1.5 cm. Base is difficult to visualize. There is some skin growing into the wound but a small area in middle remains. No odor or purulence noted. Wound cleansed with NS and packed with Aquacel ag and secured with border foam.           Wound to the lower abdomen approx 0.6 x 1.8 x 3.0 cm. Base difficult to visualize. No odor or purulence noted. Wound cleansed with NS and packed with Aquacel ag and secured with border foam.            Recommend:  Follow up with outpatient wound care   3 x week for dressing change, aquacel ag packing to wounds.         Cammy Ferrara RN [facility]   [phone]

## 2019-05-24 PROBLEM — Z49.01 FITTING AND ADJUSTMENT OF EXTRACORPOREAL DIALYSIS CATHETER: Status: RESOLVED | Noted: 2019-01-01 | Resolved: 2019-01-01

## 2019-05-24 PROBLEM — Z49.01 FITTING AND ADJUSTMENT OF EXTRACORPOREAL DIALYSIS CATHETER: Status: ACTIVE | Noted: 2019-01-01

## 2019-05-24 NOTE — PROCEDURES
[facility] Interventional Nephrology Service    Date Of Procedure: 05/24/2019    Procedure: Tunneled Dialysis Central Catheter Exchange    Indication: malfunctioning catheter    Primary Surgeon: Baldev Munroe MD  Assist: none    Referring Provider: Dr. Vallecillo and [facility]    Blood Loss: < 10 mL  Contrast Used: 0 mL    Procedure in Detail:  Patient prepped and draped in usual sterile fashion.  1% lidocaine was used for local sedation to anesthetize soft tissues.  Blunt hemostat was used to dissect exit site and fibrin sheath around catheter and its cuff.  Catheter location was visualized under fluoroscopy and appeared to be just under the diaphragm, near the RA.  Once the catheter and the cuff were freed, a guidewire was inserted through distal port into the central venous circulation (CVC).  This was done under fluoroscopic guidance.  Once wire was positioned to inferior vena cava, the old catheter was pulled while maintaining hemostasis as well as position of the wire in the CVC under fluoroscopy.  A new 55 cm catheter was then placed over the guidewire, but we were having difficulty passing the catheter past one portion of the iliac vein-SVC junction, and so we removed the catheter, use a berenstein catheter and exchanged for a stiff amplatz wire, all under fluoroscopy.  We then reattempted placement of the same 55 cm catheter over the stiff wire and were able to advance the catheter without any difficulty, and was confirmed in correct positioning under fluoroscopy.  Both ports flushed well.  The catheter was packed with 1000 units/mL heparin.  Suture was used to secure the catheter at the exit site and to anchor to patient's skin.  Additionally, it appeared a small portion of the venotomy site (1 mm) was still open (no evidence of infection), and so a new 3-0 vicryl suture was used to close the venotomy site fully.    A total of 4 sutures were placed.    Patient tolerated the procedure well and  there were no complications      Baldev Munroe MD  [phone]

## 2019-05-24 NOTE — BRIEF OP NOTE
[facility] Cath Lab [facility]  Brief Operative Note     SUMMARY     Surgery Date: 5/24/2019     Surgeon(s) and Role:     * Baldev Munroe MD - Primary    Assisting Surgeon: None    Pre-op Diagnosis:  ESRD (end stage renal disease) [N18.6]    Post-op Diagnosis:  Post-Op Diagnosis Codes:     * ESRD (end stage renal disease) [N18.6]    Procedure(s) (LRB):  REPLACEMENT, CATHETER, DIALYSIS, OVER GUIDEWIRE, USING EXISTING VENOUS ACCESS (Left)    Anesthesia: 1% lidocaine    Description of the findings of the procedure: Patient was prepped and draped in a sterile fashion.  This was a successful rewire/exchange of her left femoral tunneled dialysis catheter.  Patient tolerated the procedure well and no immediate complications noted.    Findings/Key Components:  Patient was prepped and draped in a sterile fashion.  This was a successful rewire/exchange of her left femoral tunneled dialysis catheter.  Patient tolerated the procedure well and no immediate complications noted.      Estimated Blood Loss: < 10 mL         Specimens:   Specimen (12h ago, onward)    None          Discharge Note    SUMMARY     Admit Date: 5/24/2019    Discharge Date and Time:  05/24/2019 11:23 AM    Hospital Course (synopsis of major diagnoses, care, treatment, and services provided during the course of the hospital stay):  Patient was prepped and draped in a sterile fashion.  This was a successful rewire/exchange of her left femoral tunneled dialysis catheter.  Patient tolerated the procedure well and no immediate complications noted.       Final Diagnosis: Post-Op Diagnosis Codes:     * ESRD (end stage renal disease) [N18.6]    Disposition: Home or Self Care    Follow Up/Patient Instructions:     Medications:  Reconciled Home Medications:      Medication List      CONTINUE taking these medications    acetaminophen 325 MG tablet  Commonly known as:  TYLENOL  Take 650 mg by mouth every 6 (six) hours as needed for Pain.     aspirin 81 MG EC  tablet  Commonly known as:  ECOTRIN  Take 1 tablet (81 mg total) by mouth once daily.     atorvastatin 40 MG tablet  Commonly known as:  LIPITOR  Take 40 mg by mouth every evening.     CALCI-CHEW 500 mg calcium (1,250 mg) chewable tablet  Generic drug:  calcium carbonate  Take 500 mg by mouth.     calcitRIOL 0.25 MCG Cap  Commonly known as:  ROCALTROL  Take 0.5 mcg by mouth once daily.     cetirizine 5 MG tablet  Commonly known as:  ZYRTEC  Take 1 tablet (5 mg total) by mouth once daily.     cinacalcet 90 MG Tab  Commonly known as:  SENSIPAR  Take 90 mg by mouth once daily.     ciprofloxacin HCl 500 MG tablet  Commonly known as:  CIPRO  Take 1 tablet (500 mg total) by mouth once daily. for 10 days     clopidogrel 75 mg tablet  Commonly known as:  PLAVIX  Take 1 tablet (75 mg total) by mouth once daily.     dextromethorphan-guaifenesin  mg  mg per 12 hr tablet  Commonly known as:  MUCINEX DM  Take 2 tablets by mouth 2 (two) times daily. for 10 days     ferrous sulfate 325 (65 FE) MG EC tablet  Take 1 tablet (325 mg total) by mouth 2 (two) times daily.     gabapentin 300 MG capsule  Commonly known as:  NEURONTIN  Take 1 capsule (300 mg total) by mouth 2 (two) times daily.     insulin aspart U-100 100 unit/mL (3 mL) Inpn pen  Commonly known as:  NovoLOG  Inject 0-5 Units into the skin before meals and at bedtime as needed (Hyperglycemia).     magnesium oxide 400 mg (241.3 mg magnesium) tablet  Commonly known as:  MAG-OX  Take 1 tablet (400 mg total) by mouth 3 (three) times daily.     midodrine 10 MG tablet  Commonly known as:  PROAMATINE  Take 1 tablet (10 mg total) by mouth 3 (three) times daily with meals.     NEPHROCAPS 1 mg Cap  Generic drug:  vitamin renal formula (B-complex-vitamin c-folic acid)  Take 1 capsule by mouth once daily. 1 Capsule Oral Every day     ONETOUCH VERIO Strp  Generic drug:  blood sugar diagnostic  USE 1 STRIP ONCE DAILY IN VITRO     pantoprazole 40 MG tablet  Commonly known as:   "PROTONIX  Take 1 tablet (40 mg total) by mouth once daily.     pen needle, diabetic 31 gauge x 3/16" Ndle  1 each by Misc.(Non-Drug; Combo Route) route 4 (four) times daily before meals and nightly.     senna-docusate 8.6-50 mg 8.6-50 mg per tablet  Commonly known as:  PERICOLACE  Take 1 tablet by mouth 2 (two) times daily as needed for Constipation.     sevelamer carbonate 800 mg Tab  Commonly known as:  RENVELA  Take 1 tablet (800 mg total) by mouth 3 (three) times daily with meals.          Discharge Procedure Orders   Sponge bath only until clinic visit     Call MD for:  temperature >100.4     Call MD for:  severe uncontrolled pain     Call MD for:  redness, tenderness, or signs of infection (pain, swelling, redness, odor or green/yellow discharge around incision site)     Call MD for:   Order Comments: Bleeding from the access site.     Activity as tolerated     Follow-up Information     [facility].    Why:  for dialysis  Contact information:  [facility] [place] [address] [address] [phone]               "

## 2019-05-24 NOTE — TELEPHONE ENCOUNTER
----- Message from Benedict Ferrara sent at 5/24/2019  9:12 AM CDT -----  Contact: Megan/Home health  Reason for call:Calling to find out if Dr. Alonso wants pt to follow up with wound center or follow up with him for wound care.        Communication Preference:[phone]    Additional Information:

## 2019-05-24 NOTE — H&P
[facility] [place] [facility] [place] 1  History & Physical    Subjective:      Chief Complaint/Reason for Admission: Here for tunneled dialysis catheter exchange    Jenni Todd is a 48 y.o. female with ESRD (TTS at [facility] with Dr. Vallecillo) who presents today for L femoral catheter exchange after she recently noted dysfunction with the catheter.  It was last used without difficulty on Saturday of last week, then there were issues on Tuesday (poor blood flows) and then she only had partial treatment yesterday.  This catheter was placed at [facility] with Dr. Weeks on 3/28.  She had a prolonged hospitalization at that time for peritonitis (related to PD).  At that time, he also evaluated her internal jugular veins, the R was occluded and the L had thrombus.  Of note, she is on aspirin and plavix.    Past Medical History:   Diagnosis Date    Abnormal finding on Pap smear, HPV DNA positive     Anemia associated with chronic renal failure     on Epogen    Blood type B+     Bulging discs - symptomatic      CAD (coronary artery disease)     Cardiomyopathy 3/13/2019    Diabetes mellitus, type 2     ESRD (end stage renal disease) 2004    FSGS (focal segmental glomerulosclerosis)     with collapsing    Hyperlipidemia     Hypertension     Neuropathy     NSTEMI (non-ST elevated myocardial infarction) 3/29/2019    Obesity     Secondary hyperparathyroidism, renal     TIA (transient ischemic attack)     Uterine fibroid     small uterine      Past Surgical History:   Procedure Laterality Date    ANGIOGRAM, CORONARY ARTERY N/A 4/15/2019    Performed by Roland [facility]ier MD at [facility] CATH LAB    CARDIAC CATHETERIZATION      PCI x 2     SECTION, CLASSIC      COLONOSCOPY N/A 2018    Performed by Rodrigue Russell MD at [facility] ENDO (2ND FLR)    DEBRIDEMENT-WOUND Left 2016    [facility]formed by [facility] at [facility] OR    DIALYSIS FISTULA CREATION      multiple fistulas and  grafts before PD     EGD (ESOPHAGOGASTRODUODENOSCOPY) N/A 3/14/2019    Performed by Adolph Davila MD at [facility] ENDO    EGD (ESOPHAGOGASTRODUODENOSCOPY) N/A 3/13/2019    Performed by Adolph [facility]ila MD at [facility] ENDO    INCISION AND DRAINAGE (I&D), LABIAL N/A 4/17/2016    Performed by Harmony Fernandez MD at [facility] OR    INCISION AND DRAINAGE (I&D), LABIAL (ADD ON) Left 4/18/2016    Performed by Teressa Maddox MD at [facility] OR    INCISION AND DRAINAGE OF WOUND Left 2016    VULVAR ABCESS WITH NECROSIS    Insertion, Catheter, Central Venous, Hemodialysis N/A 3/28/2019    Performed by Kimo Weeks MD at [facility] CATH LAB    Insertion, Catheter, Central Venous, Hemodialysis N/A 3/18/2019    Performed by Kimo Weeks MD at [facility] CATH LAB    INSERTION, CATHETER, TUNNELED ABORTED Left 3/14/2019    Performed by Servando Solis MD at [facility] OR    INSERTION, GRAFT, ARTERIOVENOUS, RIGHT ARM Right 3/22/2019    Performed by BESSIE Wynn III, MD at [facility] OR 2ND FLR    INSERTION, INTRA-AORTIC BALLOON PUMP  4/15/2019    Performed by Roland Napier MD at [facility] CATH LAB    Left heart cath Left 4/15/2019    Performed by Roland Napier MD at [facility] CATH LAB    ORIF, HIP Left 9/13/2018    Performed by Tevin Grullon MD at [facility] OR    PERITONEAL CATHETER INSERTION      PERMCATH REWIRE- TUNNELED CATH REWIRE Left 11/13/2017    Performed by Baldev Munroe MD at [facility] CATH LAB    PERMCATH REWIRE- TUNNELED CATH REWIRE N/A 10/5/2017    Performed by Baldev Munroe MD at [facility] CATH LAB    REMOVAL, CATHETER, DIALYSIS, PERITONEAL N/A 3/14/2019    Performed by Servando Solis MD at [facility] OR    UMBILICAL HERNIA REPAIR      Venogram, possible intervention Right 3/20/2019    Performed by BESSIE Wynn III, MD at [facility] CATH LAB     Family History   Problem Relation Age of Onset    Cancer Maternal Grandmother     Cancer Paternal Grandfather     Diabetes Maternal Aunt     Diabetes Paternal Aunt     Kidney  disease Neg Hx     Heart disease Neg Hx     Ovarian cancer Neg Hx     Breast cancer Neg Hx      Social History     Tobacco Use    Smoking status: Never Smoker    Smokeless tobacco: Never Used   Substance Use Topics    Alcohol use: No     Comment: Pt reports some social use of about 1-2 drinks about every six months.    Drug use: No       PTA Medications   Medication Sig    acetaminophen (TYLENOL) 325 MG tablet Take 650 mg by mouth every 6 (six) hours as needed for Pain.    aspirin (ECOTRIN) 81 MG EC tablet Take 1 tablet (81 mg total) by mouth once daily.    calcitRIOL (ROCALTROL) 0.25 MCG Cap Take 0.5 mcg by mouth once daily.     calcium carbonate (CALCI-CHEW) 500 mg calcium (1,250 mg) chewable tablet Take 500 mg by mouth.    cetirizine (ZYRTEC) 5 MG tablet Take 1 tablet (5 mg total) by mouth once daily.    cinacalcet (SENSIPAR) 90 MG Tab Take 90 mg by mouth once daily.    ciprofloxacin HCl (CIPRO) 500 MG tablet Take 1 tablet (500 mg total) by mouth once daily. for 10 days    clopidogrel (PLAVIX) 75 mg tablet Take 1 tablet (75 mg total) by mouth once daily.    dextromethorphan-guaifenesin  mg (MUCINEX DM)  mg per 12 hr tablet Take 2 tablets by mouth 2 (two) times daily. for 10 days    ferrous sulfate 325 (65 FE) MG EC tablet Take 1 tablet (325 mg total) by mouth 2 (two) times daily.    gabapentin (NEURONTIN) 300 MG capsule Take 1 capsule (300 mg total) by mouth 2 (two) times daily.    insulin aspart U-100 (NOVOLOG) 100 unit/mL (3 mL) InPn pen Inject 0-5 Units into the skin before meals and at bedtime as needed (Hyperglycemia).    magnesium oxide (MAG-OX) 400 mg (241.3 mg magnesium) tablet Take 1 tablet (400 mg total) by mouth 3 (three) times daily.    midodrine (PROAMATINE) 10 MG tablet Take 1 tablet (10 mg total) by mouth 3 (three) times daily with meals.    sevelamer carbonate (RENVELA) 800 mg Tab Take 1 tablet (800 mg total) by mouth 3 (three) times daily with meals.    vitamin  "renal formula, B-complex-vitamin c-folic acid, (B COMPLEX-C-FOLIC ACID) 1 mg Cap Take 1 capsule by mouth once daily. 1 Capsule Oral Every day    atorvastatin (LIPITOR) 40 MG tablet Take 40 mg by mouth every evening.     ONETOUCH VERIO Strp USE 1 STRIP ONCE DAILY IN VITRO    pantoprazole (PROTONIX) 40 MG tablet Take 1 tablet (40 mg total) by mouth once daily.    pen needle, diabetic 31 gauge x 3/16" Ndle 1 each by Misc.(Non-Drug; Combo Route) route 4 (four) times daily before meals and nightly.    senna-docusate 8.6-50 mg (PERICOLACE) 8.6-50 mg per tablet Take 1 tablet by mouth 2 (two) times daily as needed for Constipation.     Review of patient's allergies indicates:   Allergen Reactions    Clindamycin Diarrhea    Flagyl [metronidazole hcl]     Metronidazole         Review of Systems   Constitutional: Negative.    Respiratory: Negative.    Cardiovascular: Negative.        Objective:      Vital Signs (Most Recent)  Temp: 98.3 °F (36.8 °C) (05/24/19 0818)  Pulse: 80 (05/24/19 0818)  Resp: 18 (05/24/19 0818)  BP: 100/69 (05/24/19 0818)  SpO2: 100 % (05/24/19 0818)    Vital Signs Range (Last 24H):  Temp:  [98.3 °F (36.8 °C)]   Pulse:  [80]   Resp:  [18]   BP: (100)/(69)   SpO2:  [100 %]     Physical Exam   Constitutional: She is oriented to person, place, and time. She appears well-developed and well-nourished. No distress.   HENT:   Head: Normocephalic and atraumatic.   Eyes: EOM are normal.   Neck: Neck supple.   Cardiovascular:   L femoral tunneled dialysis catheter, clean without erythema or drainage.   Pulmonary/Chest: Effort normal. No respiratory distress.   Neurological: She is alert and oriented to person, place, and time.   Skin: Skin is warm and dry. She is not diaphoretic.   Psychiatric: She has a normal mood and affect. Her behavior is normal.       Assessment:      Active Hospital Problems    Diagnosis  POA    ESRD (end stage renal disease) on PD ( initiated dialysis 04/20/2004) [N18.6]  Yes     " Nightly PD        Resolved Hospital Problems   No resolved problems to display.       Plan:      TDC exchange today.  Risks explained, consent signed.

## 2019-05-24 NOTE — TELEPHONE ENCOUNTER
Returned [facility]'s phone call in reference to pt wound care.  She states they are already doing wound care 3 x/week-wound healing with no complications.  Pt has a f/u appt on 5/30.  We will see pt in clinic before deciding on any new orders.

## 2019-05-28 NOTE — TELEPHONE ENCOUNTER
REFERRAL NOTE:    Jenni Todd has been referred to the pre-heart transplant office for consideration for orthotopic heart transplantation by Clare Sterling.Patient's appointments have been scheduled for 06/12/19 to accommodate patients schedule. Information was provided and questions were answered. [facility]/ Transplant Handout, appointment letter and campus map was mailed to the patient. My contact information was given. In basket message sent to Dr Sparks's staff informing of appointment as scheduled with Dr Araujo.

## 2019-05-29 PROBLEM — I50.42 CHRONIC COMBINED SYSTOLIC AND DIASTOLIC HEART FAILURE: Status: ACTIVE | Noted: 2019-01-01

## 2019-05-29 PROBLEM — T82.9XXA COMPLICATION OF VASCULAR DIALYSIS CATHETER: Status: ACTIVE | Noted: 2019-01-01

## 2019-05-29 NOTE — ASSESSMENT & PLAN NOTE
ESRD on HD TTHS  - Previously PD patient; PD catheter removed in 2/2019 after recurrent peritonitis and patient was transitioned to HD  - Bilateral IJ tunneled catheters were attempted without success due to thrombus  - Currently has temporary catheter in L fem (placed 3/28 and re-wired 5/23)  - Last dialyzed Saturday, outpatient dialysis center unable to use L femoral catheter yesterday, 5/28  - Nephrology consulted; plan to administer Cathflo to hemodialysis catheter to dwell for a minimum of 4 hours and dialysis tonight to assess flows  - Per nephrology: if adequate flows and able to tolerate dialysis, recommend discharge tomorrow so patient can make her Gen Surg appointment with Dr. Alonso (5/30). IF catheter does not work properly after cathflo, please make patient NPO after midnight and we will have Dr. Weeks ([facility]) evaluate for re-wire. Either way, would like patient to be discharged so she can make her appointment with Dr. Alonso, as PD will be her best option for long term dialysis given her extensive vascular history.  - NPO at [facility] as precaution

## 2019-05-29 NOTE — H&P
[facility] Hospital Medicine  History & Physical    Patient Name: Jenni Todd  MRN: [id number]  Admission Date: 5/29/2019  Attending Physician: Fiordaliza Llamas MD   Primary Care Provider: Michael Tan Iii, MD    [facility] Medicine Team: [facility] MED  Julieta West PA-C     Patient information was obtained from patient, past medical records and ER records.     Subjective:     Principal Problem:Complication of vascular dialysis catheter    Chief Complaint:   Chief Complaint   Patient presents with    Vascular Access Problem     L fem perm cath not working, needing dialysis        HPI: 48 year old female with a PMHx of ESRD on HD TTHS, anemia in ESRD, DM2 with neuropathy, CAD, HLD, HFrEF presenting to the ER due to malfunctioning HD catheter. Patient was converted to hemodialysis in February 2019 after recurrent peritonitis. She has had multiple attempts and placements of tunneled catheters d/t occlusion of RIJ and thrombus in LIJ. Tunneled dialysis catheter to L fem was placed 3/28 and was re-wired 5/23. HD center was unable to use catheter on Tuesday, May 28 and noted to have poor flows. At the time of my exam, patient has no complaints. She denies CP, SOB, abdominal pain, N/V/D, fever/chills, recent illness, BRBPR/melena. She endorses compliance with home medications and fluid restriction.    Of note, she has been following with Dr. Alonso for future PD catheter placement has an appointment scheduled for tomorrow.      In ED, HDS on admission. ISTAT shows stable electrolytes. CBC and CMP pending. Nephrology consulted in the ER.     Past Medical History:   Diagnosis Date    Abnormal finding on Pap smear, HPV DNA positive 2014    Anemia associated with chronic renal failure     on Epogen    Blood type B+     Bulging discs - symptomatic      CAD (coronary artery disease)     Cardiomyopathy 3/13/2019    Diabetes mellitus, type 2     ESRD (end stage renal disease) 04/20/2004     FSGS (focal segmental glomerulosclerosis)     with collapsing    Hyperlipidemia     Hypertension 2004    Neuropathy     NSTEMI (non-ST elevated myocardial infarction) 3/29/2019    Obesity     Secondary hyperparathyroidism, renal     TIA (transient ischemic attack)     Uterine fibroid     small uterine        Past Surgical History:   Procedure Laterality Date    ANGIOGRAM, CORONARY ARTERY N/A 4/15/2019    Performed [facility] at [facility] CATH LAB    CARDIAC CATHETERIZATION      PCI x 2     SECTION, CLASSIC      COLONOSCOPY N/A 2018    Performed by Rodrigue Russell MD at [facility] ENDO (2ND FLR)    DEBRIDEMENT-WOUND Left 2016    Performed by Teressa Maddox MD at [facility] OR    DIALYSIS FISTULA CREATION      multiple fistulas and grafts before PD     EGD (ESOPHAGOGASTRODUODENOSCOPY) N/A 3/14/2019    Performed by Adolph Davila MD at [facility] ENDO    EGD (ESOPHAGOGASTRODUODENOSCOPY) N/A 3/13/2019    Performed by Adolph Davila MD at [facility] ENDO    INCISION AND DRAINAGE (I&D), LABIAL N/A 2016    Performed by Harmony Fernandez MD at [facility] OR    INCISION AND DRAINAGE (I&D), LABIAL (ADD ON) Left 2016    Performed by Teressa Maddox MD at [facility] OR    INCISION AND DRAINAGE OF WOUND Left     VULVAR ABCESS WITH NECROSIS    Insertion, Catheter, Central Venous, Hemodialysis N/A 3/28/2019    Performed by [facility]eks MD at [facility] CATH LAB    Insertion, Catheter, Central Venous, Hemodialysis N/A 3/18/2019    Performed by [facility] MD at [facility] CATH LAB    INSERTION, CATHETER, TUNNELED ABORTED Left 3/14/2019    Performed by Servando Solis MD at [facility] OR    INSERTION, GRAFT, ARTERIOVENOUS, RIGHT ARM Right 3/22/2019    Performed by BESSIE Wynn III, MD at [facility] OR 2ND FLR    INSERTION, INTRA-AORTIC BALLOON PUMP  4/15/2019    Performed by Roland [facility]ier MD at [facility] CATH LAB    Left heart cath Left 4/15/2019    Performed by Roland Napier MD at  [facility] CATH LAB    ORIF, HIP Left 9/13/2018    Performed by Tevin Grullon MD at [facility] OR    PERITONEAL CATHETER INSERTION      PERMCATH REWIRE- TUNNELED CATH REWIRE Left 11/13/2017    Performed by Baldev Munroe MD at [facility] CATH LAB    PERMCATH REWIRE- TUNNELED CATH REWIRE N/A 10/5/2017    Performed by Baldev Munroe MD at [facility] CATH LAB    REMOVAL, CATHETER, DIALYSIS, PERITONEAL N/A 3/14/2019    Performed by Servando Solis MD at [facility] OR    REPLACEMENT, CATHETER, DIALYSIS, OVER GUIDEWIRE, USING EXISTING VENOUS ACCESS Left 5/24/2019    Performed by Baldev Munroe MD at [facility] CATH LAB    UMBILICAL HERNIA REPAIR      Venogram, possible intervention Right 3/20/2019    Performed by BESSIE Wynn III, MD at [facility] CATH LAB       Review of patient's allergies indicates:   Allergen Reactions    Clindamycin Diarrhea    Flagyl [metronidazole hcl]     Metronidazole        No current facility-administered medications on file prior to encounter.      Current Outpatient Medications on File Prior to Encounter   Medication Sig    aspirin (ECOTRIN) 81 MG EC tablet Take 1 tablet (81 mg total) by mouth once daily.    calcitRIOL (ROCALTROL) 0.25 MCG Cap Take 0.5 mcg by mouth once daily.     calcium carbonate (CALCI-CHEW) 500 mg calcium (1,250 mg) chewable tablet Take 500 mg by mouth.    cetirizine (ZYRTEC) 5 MG tablet Take 1 tablet (5 mg total) by mouth once daily.    cinacalcet (SENSIPAR) 90 MG Tab Take 90 mg by mouth once daily.    ciprofloxacin HCl (CIPRO) 500 MG tablet Take 1 tablet (500 mg total) by mouth once daily. for 10 days    clopidogrel (PLAVIX) 75 mg tablet Take 1 tablet (75 mg total) by mouth once daily.    ferrous sulfate 325 (65 FE) MG EC tablet Take 1 tablet (325 mg total) by mouth 2 (two) times daily.    gabapentin (NEURONTIN) 300 MG capsule Take 1 capsule (300 mg total) by mouth 2 (two) times daily.    magnesium oxide (MAG-OX) 400 mg (241.3 mg magnesium) tablet Take 1 tablet (400 mg total) by  "mouth 3 (three) times daily.    midodrine (PROAMATINE) 10 MG tablet Take 1 tablet (10 mg total) by mouth 3 (three) times daily with meals.    sevelamer carbonate (RENVELA) 800 mg Tab Take 1 tablet (800 mg total) by mouth 3 (three) times daily with meals.    acetaminophen (TYLENOL) 325 MG tablet Take 650 mg by mouth every 6 (six) hours as needed for Pain.    atorvastatin (LIPITOR) 40 MG tablet Take 40 mg by mouth every evening.     [] dextromethorphan-guaifenesin  mg (MUCINEX DM)  mg per 12 hr tablet Take 2 tablets by mouth 2 (two) times daily. for 10 days    insulin aspart U-100 (NOVOLOG) 100 unit/mL (3 mL) InPn pen Inject 0-5 Units into the skin before meals and at bedtime as needed (Hyperglycemia).    ONETOUCH VERIO Strp USE 1 STRIP ONCE DAILY IN VITRO    pantoprazole (PROTONIX) 40 MG tablet Take 1 tablet (40 mg total) by mouth once daily.    pen needle, diabetic 31 gauge x 3/16" Ndle 1 each by Misc.(Non-Drug; Combo Route) route 4 (four) times daily before meals and nightly.    senna-docusate 8.6-50 mg (PERICOLACE) 8.6-50 mg per tablet Take 1 tablet by mouth 2 (two) times daily as needed for Constipation.    vitamin renal formula, B-complex-vitamin c-folic acid, (B COMPLEX-C-FOLIC ACID) 1 mg Cap Take 1 capsule by mouth once daily. 1 Capsule Oral Every day     Family History     Problem Relation (Age of Onset)    Cancer Maternal Grandmother, Paternal Grandfather    Diabetes Maternal Aunt, Paternal Aunt        Tobacco Use    Smoking status: Never Smoker    Smokeless tobacco: Never Used   Substance and Sexual Activity    Alcohol use: No     Comment: Pt reports some social use of about 1-2 drinks about every six months.    Drug use: No    Sexual activity: Not Currently     Partners: Male     Birth control/protection: None     Review of Systems   Constitutional: Negative for chills, diaphoresis, fatigue and fever.   HENT: Negative for congestion, hearing loss, rhinorrhea, sinus " pressure and trouble swallowing.    Respiratory: Negative for cough, chest tightness, shortness of breath and wheezing.    Cardiovascular: Negative for chest pain, palpitations and leg swelling.   Gastrointestinal: Negative for abdominal distention, abdominal pain, diarrhea, nausea and vomiting.   Musculoskeletal: Negative for back pain, gait problem, joint swelling and neck pain.   Skin: Negative for rash and wound.   Neurological: Negative for dizziness, seizures, facial asymmetry, speech difficulty, weakness and light-headedness.   Psychiatric/Behavioral: Negative for agitation, behavioral problems, confusion and dysphoric mood. The patient is not nervous/anxious.      Objective:     Vital Signs (Most Recent):  Temp: 98.8 °F (37.1 °C) (05/29/19 1404)  Pulse: 86 (05/29/19 1504)  Resp: (!) 24 (05/29/19 1504)  BP: 114/81 (05/29/19 1504)  SpO2: 100 % (05/29/19 1504) Vital Signs (24h Range):  Temp:  [98.4 °F (36.9 °C)-98.8 °F (37.1 °C)] 98.8 °F (37.1 °C)  Pulse:  [86-92] 86  Resp:  [18-24] 24  SpO2:  [99 %-100 %] 100 %  BP: (103-114)/(68-81) 114/81     Weight: 95.7 kg (211 lb)  Body mass index is 32.56 kg/m².    Physical Exam   Constitutional: She is oriented to person, place, and time. She appears well-developed. No distress.   HENT:   Head: Normocephalic and atraumatic.   Eyes: Conjunctivae and EOM are normal. Right eye exhibits no discharge. Left eye exhibits no discharge. No scleral icterus.   Neck: Normal range of motion. Neck supple. No JVD present. No tracheal deviation present.   Cardiovascular: Normal rate, regular rhythm, normal heart sounds and intact distal pulses.   Pulmonary/Chest: Effort normal and breath sounds normal. No stridor. No respiratory distress.   Abdominal: Soft. Bowel sounds are normal. She exhibits no distension. There is no tenderness.   Musculoskeletal: Normal range of motion. She exhibits no edema or tenderness.   Neurological: She is alert and oriented to person, place, and time. No  cranial nerve deficit.   Skin: Skin is warm and dry. She is not diaphoretic. No erythema.   L femoral tunneled catheter    Psychiatric: She has a normal mood and affect. Her behavior is normal.         CRANIAL NERVES     CN III, IV, VI   Extraocular motions are normal.        Significant Labs: All pertinent labs within the past 24 hours have been reviewed.    Significant Imaging: I have reviewed all pertinent imaging results/findings within the past 24 hours.    Assessment/Plan:     * Complication of vascular dialysis catheter  ESRD on HD TTHS  - Previously PD patient; PD catheter removed in 2/2019 after recurrent peritonitis and patient was transitioned to HD  - Bilateral IJ tunneled catheters were attempted without success due to thrombus  - Currently has temporary catheter in L fem (placed 3/28 and re-wired 5/23)  - Last dialyzed Saturday, outpatient dialysis center unable to use L femoral catheter yesterday, 5/28  - Nephrology consulted; plan to administer Cathflo to hemodialysis catheter to dwell for a minimum of 4 hours and dialysis tonight to assess flows  - Per nephrology: if adequate flows and able to tolerate dialysis, recommend discharge tomorrow so patient can make her Gen Surg appointment with Dr. Alonso (5/30). IF catheter does not work properly after cathflo, please make patient NPO after midnight and we will have Dr. Weeks ([facility]) evaluate for re-wire. Either way, would like patient to be discharged so she can make her appointment with Dr. Alonso, as PD will be her best option for long term dialysis given her extensive vascular history.  - NPO at [facility] as precaution    ESRD (end stage renal disease) on dialysis  Anemia in ESRD  - ESRD on HD TTHS  - Nephrology consulted; see above  - H/H above baseline  - Electrolytes stable  - Renal, DM diet  - Continue home regimen: sensipar 90 mg daily, calcitriol 0.25 mcg daily, renvela 800 mg TIDWM  - Strict I/Os, daily weights  - RFP daily    Type 2  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 and hypertension  Neuropathy  -  on admit  - A1c pending  - Renal, DM diet  - Low dose SSI  - Monitor BGs and adjust insulin PRN  - Continue home gabapentin    Chronic combined systolic and diastolic heart failure  CAD  HLD  - CT surgery/CABG eval: not a candidate due to comorbidities  - 2d echo from 4/2019 shows EF of 25%, DD, WMA  - Euvolemic on exam  - Denies CP/SOB  - Continue HD for volume removal  - Continue ASA, statin, and plavix daily  - Strict I/Os, daily weights, fluid restriction        VTE Risk Mitigation (From admission, onward)        Ordered     Place MEERA hose  Until discontinued      05/29/19 1553     Place sequential compression device  Until discontinued      05/29/19 1553     IP VTE HIGH RISK PATIENT  Once      05/29/19 1553             Julieta West PA-C  Department of Hospital Medicine   [facility]

## 2019-05-29 NOTE — CONSULTS
[facility]  Nephrology  Consult Note    Patient Name: Jenni Todd  MRN: [id number]  Admission Date: 5/29/2019  Hospital Length of Stay: 0 days  Attending Provider: Fiordaliza Llamas MD   Primary Care Physician: Michael Tan Iii, MD  Principal Problem:Complication of vascular dialysis catheter    Inpatient consult to Nephrology  Consult performed by: Andrew Ponce NP  Consult ordered by: Julieta West PA-C  Reason for consult: Catheter malfunction        Subjective:     HPI: Ms. Jenni Todd is a 49 yo AAF well known to our service.  She is an ESRD patient with multiple failed vascular access who presents to the ED on 5/29 for evaluation of malfunctioning hemodialysis catheter.  She was recently converted to hemodialysis from peritoneal dialysis for recurrent peritonitis earlier this year.  She has had extensive attempts and placement of hemodialysis tunneled catheters, but due to occlusion of her RIJ and thrombus in her LIJ.  A Tunneled dialysis catheter was succefully placed by Dr. Weeks on 3/28, which she reports was working fine until last Thursday (8/23). She reports during this treatment, they were having a lot of low blood flow rates, so she was referred to Dr. Munroe at [facility] for re-wire.  She underwent successful re-wire and had HD post with adequate blood flow rates and her treatment on Saturday was also noted to have good flows.  On Tuesday, she reports that the catheter was unable to be used, noted to have poor flows and her treatment had to be discontinued.  No Cathflo was administered because the clinic was out, so she was referred here for further evaluation.  Of note, she has been following with Dr. Alonso for future PD catheter placement, appointment scheduled for tomorrow.    Past Medical History:   Diagnosis Date    Abnormal finding on Pap smear, HPV DNA positive 2014    Anemia associated with chronic renal failure     on Epogen    Blood type B+     Bulging  discs - symptomatic      CAD (coronary artery disease)     Cardiomyopathy 3/13/2019    Diabetes mellitus, type 2     ESRD (end stage renal disease) 2004    FSGS (focal segmental glomerulosclerosis)     with collapsing    Hyperlipidemia     Hypertension 2004    Neuropathy     NSTEMI (non-ST elevated myocardial infarction) 3/29/2019    Obesity     Secondary hyperparathyroidism, renal     TIA (transient ischemic attack)     Uterine fibroid     small uterine        Past Surgical History:   Procedure Laterality Date    ANGIOGRAM, CORONARY ARTERY N/A 4/15/2019    Performed by [facility]ier [facility] at [facility] CATH LAB    CARDIAC CATHETERIZATION      PCI x 2     SECTION, CLASSIC      COLONOSCOPY N/A 2018    Performed by Rodrigue Russell MD at [facility] ENDO (2ND FLR)    DEBRIDEMENT-WOUND Left 2016    Performed by Teressa [facility]adox MD at [facility] OR    DIALYSIS FISTULA CREATION      multiple fistulas and grafts before PD     EGD (ESOPHAGOGASTRODUODENOSCOPY) N/A 3/14/2019    Performed by Adolph [facility]ila MD at [facility] ENDO    EGD (ESOPHAGOGASTRODUODENOSCOPY) N/A 3/13/2019    Performed by Adolph [facility]ila MD at [facility] ENDO    INCISION AND DRAINAGE (I&D), LABIAL N/A 2016    Performed by Harmony Fernandez MD at [facility] OR    INCISION AND DRAINAGE (I&D), LABIAL (ADD ON) Left 2016    Performed by Teressa Maddox MD at [facility] OR    INCISION AND DRAINAGE OF WOUND Left     VULVAR ABCESS WITH NECROSIS    Insertion, Catheter, Central Venous, Hemodialysis N/A 3/28/2019    Performed by Kimo Weeks MD at [facility] CATH LAB    Insertion, Catheter, Central Venous, Hemodialysis N/A 3/18/2019    Performed by [facility]o Weeks MD at [facility] CATH LAB    INSERTION, CATHETER, TUNNELED ABORTED Left 3/14/2019    Performed by Servando Solis MD at [facility] OR    INSERTION, GRAFT, ARTERIOVENOUS, RIGHT ARM Right 3/22/2019    Performed by BESSIE Wynn III, MD at [facility] OR 2ND FLR    INSERTION,  INTRA-AORTIC BALLOON PUMP  4/15/2019    Performed by Roland [facility]ier MD at [facility] CATH LAB    Left heart cath Left 4/15/2019    Performed by Roland [facility]ier MD at [facility] CATH LAB    ORIF, HIP Left 9/13/2018    Performed by Tevin Grullon MD at [facility] OR    PERITONEAL CATHETER INSERTION      PERMCATH REWIRE- TUNNELED CATH REWIRE Left 11/13/2017    Performed by Baldev Munroe MD at [facility] CATH LAB    PERMCATH REWIRE- TUNNELED CATH REWIRE N/A 10/5/2017    Performed by Baldev Munroe MD at [facility] CATH LAB    REMOVAL, CATHETER, DIALYSIS, PERITONEAL N/A 3/14/2019    Performed by Servando Solis MD at [facility] OR    REPLACEMENT, CATHETER, DIALYSIS, OVER GUIDEWIRE, USING EXISTING VENOUS ACCESS Left 5/24/2019    Performed by Baldev Munroe MD at [facility] CATH LAB    UMBILICAL HERNIA REPAIR      Venogram, possible intervention Right 3/20/2019    Performed by BESSIE Wynn III, MD at [facility] CATH LAB       Review of patient's allergies indicates:   Allergen Reactions    Clindamycin Diarrhea    Flagyl [metronidazole hcl]     Metronidazole      Current Facility-Administered Medications   Medication Frequency    acetaminophen tablet 650 mg Q6H PRN    alteplase injection 6 mg Once    [START ON 5/30/2019] aspirin EC tablet 81 mg Daily    atorvastatin tablet 40 mg QHS    [START ON 5/30/2019] calcitRIOL capsule 0.5 mcg Daily    [START ON 5/30/2019] calcium carbonate (OS-PETEY) tablet 500 mg Daily    [START ON 5/30/2019] cetirizine tablet 5 mg Daily    [START ON 5/30/2019] cinacalcet tablet 90 mg Daily    [START ON 5/30/2019] clopidogrel tablet 75 mg Daily    dextrose 50% injection 12.5 g PRN    dextrose 50% injection 25 g PRN    ferrous sulfate EC tablet 325 mg BID    gabapentin capsule 300 mg BID    glucagon (human recombinant) injection 1 mg PRN    glucose chewable tablet 16 g PRN    glucose chewable tablet 24 g PRN    magnesium oxide tablet 400 mg TID    midodrine tablet 10 mg TID WM    ondansetron  "disintegrating tablet 8 mg Q8H PRN    ondansetron injection 4 mg Q8H PRN    [START ON 5/30/2019] pantoprazole EC tablet 40 mg Daily    sevelamer carbonate tablet 800 mg TID WM    sodium chloride 0.9% flush 3 mL Q8H     Current Outpatient Medications   Medication    aspirin (ECOTRIN) 81 MG EC tablet    calcitRIOL (ROCALTROL) 0.25 MCG Cap    calcium carbonate (CALCI-CHEW) 500 mg calcium (1,250 mg) chewable tablet    cetirizine (ZYRTEC) 5 MG tablet    cinacalcet (SENSIPAR) 90 MG Tab    ciprofloxacin HCl (CIPRO) 500 MG tablet    clopidogrel (PLAVIX) 75 mg tablet    ferrous sulfate 325 (65 FE) MG EC tablet    gabapentin (NEURONTIN) 300 MG capsule    magnesium oxide (MAG-OX) 400 mg (241.3 mg magnesium) tablet    midodrine (PROAMATINE) 10 MG tablet    sevelamer carbonate (RENVELA) 800 mg Tab    acetaminophen (TYLENOL) 325 MG tablet    atorvastatin (LIPITOR) 40 MG tablet    insulin aspart U-100 (NOVOLOG) 100 unit/mL (3 mL) InPn pen    ONETOUCH VERIO Strp    pantoprazole (PROTONIX) 40 MG tablet    pen needle, diabetic 31 gauge x 3/16" Ndle    senna-docusate 8.6-50 mg (PERICOLACE) 8.6-50 mg per tablet    vitamin renal formula, B-complex-vitamin c-folic acid, (B COMPLEX-C-FOLIC ACID) 1 mg Cap     Family History     Problem Relation (Age of Onset)    Cancer Maternal Grandmother, Paternal Grandfather    Diabetes Maternal Aunt, Paternal Aunt        Tobacco Use    Smoking status: Never Smoker    Smokeless tobacco: Never Used   Substance and Sexual Activity    Alcohol use: No     Comment: Pt reports some social use of about 1-2 drinks about every six months.    Drug use: No    Sexual activity: Not Currently     Partners: Male     Birth control/protection: None     Review of Systems   Constitutional: Negative for activity change and fatigue.   Respiratory: Negative for cough and shortness of breath.    Cardiovascular: Negative for chest pain, palpitations and leg swelling.   Gastrointestinal: Negative " for abdominal pain, nausea and vomiting.   Endocrine: Negative for polydipsia, polyphagia and polyuria.   Genitourinary: Negative for dysuria.   Musculoskeletal: Negative for gait problem.   Skin: Negative for rash.   Allergic/Immunologic: Negative for immunocompromised state.   Neurological: Negative for dizziness, syncope and weakness.   Hematological: Does not bruise/bleed easily.   Psychiatric/Behavioral: Negative for behavioral problems.     Objective:     Vital Signs (Most Recent):  Temp: 98.8 °F (37.1 °C) (05/29/19 1404)  Pulse: 86 (05/29/19 1504)  Resp: (!) 24 (05/29/19 1504)  BP: 114/81 (05/29/19 1504)  SpO2: 100 % (05/29/19 1504)  O2 Device (Oxygen Therapy): room air (05/29/19 1404) Vital Signs (24h Range):  Temp:  [98.4 °F (36.9 °C)-98.8 °F (37.1 °C)] 98.8 °F (37.1 °C)  Pulse:  [86-92] 86  Resp:  [18-24] 24  SpO2:  [99 %-100 %] 100 %  BP: (103-114)/(68-81) 114/81     Weight: 95.7 kg (211 lb) (05/29/19 1344)  Body mass index is 32.56 kg/m².  Body surface area is 2.13 meters squared.    No intake/output data recorded.    Physical Exam   Constitutional: She is oriented to person, place, and time. She appears well-developed. No distress.   HENT:   Head: Normocephalic and atraumatic.   Right Ear: External ear normal.   Left Ear: External ear normal.   Eyes: Conjunctivae and EOM are normal. Right eye exhibits no discharge. Left eye exhibits no discharge. No scleral icterus.   Neck: Normal range of motion. Neck supple.   Cardiovascular: Normal rate and regular rhythm. Exam reveals no gallop and no friction rub.   No murmur heard.  Pulmonary/Chest: Effort normal and breath sounds normal. No respiratory distress. She has no wheezes. She has no rales.   Abdominal: Soft. Bowel sounds are normal. She exhibits distension. There is no tenderness.   Musculoskeletal: Normal range of motion. She exhibits no edema or deformity.   Neurological: She is alert and oriented to person, place, and time.   Skin: Skin is warm and  dry. She is not diaphoretic.   L femoral tunneled catheter   Psychiatric: She has a normal mood and affect. Her behavior is normal.         Assessment/Plan:     ESRD (end stage renal disease) on dialysis  ESRD on iHD TTS  Last treatment on Saturday, unable to be dialyzed 2/2 L femoral tunneled catheter dysfunction  Catheter was re-wired last Friday (5/23).    Plan/Recommendations:  -Will administer Cathflo to hemodialysis catheter to dwell for a minimum of 4 hours  -will plan for dialysis tonight to assess flows  -if adequate flows and able to tolerate dialysis, recommend discharge tomorrow so patient can make her Gen Surg appointment with Dr. Alonso  -IF catheter does not work properly after cathflo, please make patient NPO after midnight and we will have Dr. Weeks ([facility]) evaluate for re-wire.  Either way, would like patient to be discharged so she can make her appointment with Dr. Alonso, as PD will be her best option for long term dialysis given her extensive vascular history.    Andrew Ragsdale, NP  Nephrology  [facility]

## 2019-05-29 NOTE — ASSESSMENT & PLAN NOTE
ESRD on iHD TTS  Last treatment on Saturday, unable to be dialyzed 2/2 L femoral tunneled catheter dysfunction  Catheter was re-wired last Friday (5/23).    Plan/Recommendations:  -Will administer Cathflo to hemodialysis catheter to dwell for a minimal of 4 hours  -will plan for dialysis tonight to assess flows  -if adequate flows and able to tolerate dialysis, recommend discharge tomorrow so patient can make her Gen Surg appointment with Dr. Alonso  -IF catheter does not work properly after cathflo, please make patient NPO after midnight and we will have Dr. Weeks ([facility]) evaluate for re-wire.  Either way, would like patient to be discharged  she can make her appointment with Dr. Alonso, as PD will be her best option for long term dialysis given her extensive vascular history.

## 2019-05-29 NOTE — SUBJECTIVE & OBJECTIVE
Past Medical History:   Diagnosis Date    Abnormal finding on Pap smear, HPV DNA positive     Anemia associated with chronic renal failure     on Epogen    Blood type B+     Bulging discs - symptomatic      CAD (coronary artery disease)     Cardiomyopathy 3/13/2019    Diabetes mellitus, type 2     ESRD (end stage renal disease) 2004    FSGS (focal segmental glomerulosclerosis)     with collapsing    Hyperlipidemia     Hypertension     Neuropathy     NSTEMI (non-ST elevated myocardial infarction) 3/29/2019    Obesity     Secondary hyperparathyroidism, renal     TIA (transient ischemic attack)     Uterine fibroid     small uterine        Past Surgical History:   Procedure Laterality Date    ANGIOGRAM, CORONARY ARTERY N/A 4/15/2019    Performed by Roland Napier MD at [facility] CATH LAB    CARDIAC CATHETERIZATION      PCI x 2     SECTION, CLASSIC      COLONOSCOPY N/A 2018    Performed by Rodrigue Russell MD at [facility] ENDO (2ND FLR)    DEBRIDEMENT-WOUND Left 2016    Performed by [facility]a [facility]ox MD at [facility] OR    DIALYSIS FISTULA CREATION      multiple fistulas and grafts before PD     EGD (ESOPHAGOGASTRODUODENOSCOPY) N/A 3/14/2019    Performed by Ad[facility]ila MD at [facility] ENDO    EGD (ESOPHAGOGASTRODUODENOSCOPY) N/A 3/13/2019    Performed by Adolph Davila MD at [facility] ENDO    INCISION AND DRAINAGE (I&D), LABIAL N/A 2016    Performed by Harmony Fernandez MD at [facility] OR    INCISION AND DRAINAGE (I&D), LABIAL (ADD ON) Left 2016    Performed by Teressa Maddox MD at [facility] OR    INCISION AND DRAINAGE OF WOUND Left     VULVAR ABCESS WITH NECROSIS    Insertion, Catheter, Central Venous, Hemodialysis N/A 3/28/2019    Performed by Kimo Weeks MD at [facility] CATH LAB    Insertion, Catheter, Central Venous, Hemodialysis N/A 3/18/2019    Performed by Kimo Weeks MD at [facility] CATH LAB    INSERTION, CATHETER, TUNNELED ABORTED Left 3/14/2019     [facility]formed by [facility] MD at [facility] OR    INSERTION, GRAFT, ARTERIOVENOUS, RIGHT ARM Right 3/22/2019    Performed by BESSIE Wynn III, MD at [facility] OR 2ND FLR    INSERTION, INTRA-AORTIC BALLOON PUMP  4/15/2019    Performed by Roland Napier MD at [facility] CATH LAB    Left heart cath Left 4/15/2019    Performed by Roland Napier MD at [facility] CATH LAB    ORIF, HIP Left 9/13/2018    Performed by Tevin Grullon MD at [facility] OR    PERITONEAL CATHETER INSERTION      PERMCATH REWIRE- TUNNELED CATH REWIRE Left 11/13/2017    Performed by Baldev Munroe MD at [facility] CATH LAB    PERMCATH REWIRE- TUNNELED CATH REWIRE N/A 10/5/2017    Performed by Baldev Munroe MD at [facility] CATH LAB    REMOVAL, CATHETER, DIALYSIS, PERITONEAL N/A 3/14/2019    Performed by Servando Solis MD at [facility] OR    REPLACEMENT, CATHETER, DIALYSIS, OVER GUIDEWIRE, USING EXISTING VENOUS ACCESS Left 5/24/2019    Performed by Baldev Munroe MD at [facility] CATH LAB    UMBILICAL HERNIA REPAIR      Venogram, possible intervention Right 3/20/2019    Performed by BESSIE Wynn III, MD at [facility] CATH LAB       Review of patient's allergies indicates:   Allergen Reactions    Clindamycin Diarrhea    Flagyl [metronidazole hcl]     Metronidazole      Current Facility-Administered Medications   Medication Frequency    acetaminophen tablet 650 mg Q6H PRN    alteplase injection 6 mg Once    [START ON 5/30/2019] aspirin EC tablet 81 mg Daily    atorvastatin tablet 40 mg QHS    [START ON 5/30/2019] calcitRIOL capsule 0.5 mcg Daily    [START ON 5/30/2019] calcium carbonate (OS-PETEY) tablet 500 mg Daily    [START ON 5/30/2019] cetirizine tablet 5 mg Daily    [START ON 5/30/2019] cinacalcet tablet 90 mg Daily    [START ON 5/30/2019] clopidogrel tablet 75 mg Daily    dextrose 50% injection 12.5 g PRN    dextrose 50% injection 25 g PRN    ferrous sulfate EC tablet 325 mg BID    gabapentin capsule 300 mg BID    glucagon (human recombinant)  "injection 1 mg PRN    glucose chewable tablet 16 g PRN    glucose chewable tablet 24 g PRN    magnesium oxide tablet 400 mg TID    midodrine tablet 10 mg TID WM    ondansetron disintegrating tablet 8 mg Q8H PRN    ondansetron injection 4 mg Q8H PRN    [START ON 5/30/2019] pantoprazole EC tablet 40 mg Daily    sevelamer carbonate tablet 800 mg TID WM    sodium chloride 0.9% flush 3 mL Q8H     Current Outpatient Medications   Medication    aspirin (ECOTRIN) 81 MG EC tablet    calcitRIOL (ROCALTROL) 0.25 MCG Cap    calcium carbonate (CALCI-CHEW) 500 mg calcium (1,250 mg) chewable tablet    cetirizine (ZYRTEC) 5 MG tablet    cinacalcet (SENSIPAR) 90 MG Tab    ciprofloxacin HCl (CIPRO) 500 MG tablet    clopidogrel (PLAVIX) 75 mg tablet    ferrous sulfate 325 (65 FE) MG EC tablet    gabapentin (NEURONTIN) 300 MG capsule    magnesium oxide (MAG-OX) 400 mg (241.3 mg magnesium) tablet    midodrine (PROAMATINE) 10 MG tablet    sevelamer carbonate (RENVELA) 800 mg Tab    acetaminophen (TYLENOL) 325 MG tablet    atorvastatin (LIPITOR) 40 MG tablet    insulin aspart U-100 (NOVOLOG) 100 unit/mL (3 mL) InPn pen    ONETOUCH VERIO Strp    pantoprazole (PROTONIX) 40 MG tablet    pen needle, diabetic 31 gauge x 3/16" Ndle    senna-docusate 8.6-50 mg (PERICOLACE) 8.6-50 mg per tablet    vitamin renal formula, B-complex-vitamin c-folic acid, (B COMPLEX-C-FOLIC ACID) 1 mg Cap     Family History     Problem Relation (Age of Onset)    Cancer Maternal Grandmother, Paternal Grandfather    Diabetes Maternal Aunt, Paternal Aunt        Tobacco Use    Smoking status: Never Smoker    Smokeless tobacco: Never Used   Substance and Sexual Activity    Alcohol use: No     Comment: Pt reports some social use of about 1-2 drinks about every six months.    Drug use: No    Sexual activity: Not Currently     Partners: Male     Birth control/protection: None     Review of Systems   Constitutional: Negative for activity " change and fatigue.   Respiratory: Negative for cough and shortness of breath.    Cardiovascular: Negative for chest pain, palpitations and leg swelling.   Gastrointestinal: Negative for abdominal pain, nausea and vomiting.   Endocrine: Negative for polydipsia, polyphagia and polyuria.   Genitourinary: Negative for dysuria.   Musculoskeletal: Negative for gait problem.   Skin: Negative for rash.   Allergic/Immunologic: Negative for immunocompromised state.   Neurological: Negative for dizziness, syncope and weakness.   Hematological: Does not bruise/bleed easily.   Psychiatric/Behavioral: Negative for behavioral problems.     Objective:     Vital Signs (Most Recent):  Temp: 98.8 °F (37.1 °C) (05/29/19 1404)  Pulse: 86 (05/29/19 1504)  Resp: (!) 24 (05/29/19 1504)  BP: 114/81 (05/29/19 1504)  SpO2: 100 % (05/29/19 1504)  O2 Device (Oxygen Therapy): room air (05/29/19 1404) Vital Signs (24h Range):  Temp:  [98.4 °F (36.9 °C)-98.8 °F (37.1 °C)] 98.8 °F (37.1 °C)  Pulse:  [86-92] 86  Resp:  [18-24] 24  SpO2:  [99 %-100 %] 100 %  BP: (103-114)/(68-81) 114/81     Weight: 95.7 kg (211 lb) (05/29/19 1344)  Body mass index is 32.56 kg/m².  Body surface area is 2.13 meters squared.    No intake/output data recorded.    Physical Exam   Constitutional: She is oriented to person, place, and time. She appears well-developed. No distress.   HENT:   Head: Normocephalic and atraumatic.   Right Ear: External ear normal.   Left Ear: External ear normal.   Eyes: Conjunctivae and EOM are normal. Right eye exhibits no discharge. Left eye exhibits no discharge. No scleral icterus.   Neck: Normal range of motion. Neck supple.   Cardiovascular: Normal rate and regular rhythm. Exam reveals no gallop and no friction rub.   No murmur heard.  Pulmonary/Chest: Effort normal and breath sounds normal. No respiratory distress. She has no wheezes. She has no rales.   Abdominal: Soft. Bowel sounds are normal. She exhibits distension. There is no  tenderness.   Musculoskeletal: Normal range of motion. She exhibits no edema or deformity.   Neurological: She is alert and oriented to person, place, and time.   Skin: Skin is warm and dry. She is not diaphoretic.   L femoral tunneled catheter   Psychiatric: She has a normal mood and affect. Her behavior is normal.

## 2019-05-29 NOTE — ED NOTES
Patient identifiers verified and correct for Jenni Todd.    LOC: The patient is awake, alert and aware of environment with an appropriate affect, the patient is oriented x 3 and speaking appropriately.  APPEARANCE: Patient resting comfortably and in no acute distress, patient is clean and well groomed, patient's clothing is properly fastened.  SKIN: The skin is warm and dry, color consistent with ethnicity, patient has normal skin turgor and moist mucus membranes, skin intact, no breakdown or bruising noted.  Dressing RUQ and RLQ c/d/i from previous peritoneal dialysis. Permacath in the left groin.   MUSCULOSKELETAL: Patient moving all extremities spontaneously, no obvious swelling or deformities noted.  RESPIRATORY: Airway is open and patent, respirations are spontaneous, patient has a normal effort and rate, no accessory muscle use noted, bilateral breath sounds clear. Pt CARDIAC: Patient has a normal rate and regular rhythm, no periphreal edema noted, capillary refill < 3 seconds.  ABDOMEN: Soft and non tender to palpation, no distention noted, normoactive bowel sounds present in all four quadrants.  NEUROLOGIC: PERRL, **mm bilaterally, eyes open spontaneously, behavior appropriate to situation, follows commands, facial expression symmetrical, bilateral hand grasp equal and even, purposeful motor response noted, normal sensation in all extremities when touched with a finger.

## 2019-05-29 NOTE — HPI
48 year old female with a PMHx of ESRD on HD TTHS, anemia in ESRD, DM2 with neuropathy, CAD, HLD, HFrEF presenting to the ER due to malfunctioning HD catheter. Patient was converted to hemodialysis in February 2019 after recurrent peritonitis. She has had multiple attempts and placements of tunneled catheters d/t occlusion of RIJ and thrombus in LIJ. Tunneled dialysis catheter to L fem was placed 3/28 and was re-wired 5/23. HD center was unable to use catheter on Tuesday, May 28 and noted to have poor flows. At the time of my exam, patient has no complaints. She denies CP, SOB, abdominal pain, N/V/D, fever/chills, recent illness, BRBPR/melena. She endorses compliance with home medications and fluid restriction.    Of note, she has been following with Dr. Alonso for future PD catheter placement has an appointment scheduled for tomorrow.      In ED, HDS on admission. ISTAT shows stable electrolytes. CBC and CMP pending. Nephrology consulted in the ER.

## 2019-05-29 NOTE — ED TRIAGE NOTES
"Jenni Todd, a 48 y.o. female presents to the ED w/ complaint of improperly working permacath in the left leg.  Pt went for dialysis yesterday and they stated that the cath was "positional" and not able to perform treatment.  Pt states that they were able to pull back from it though.  Pt denies CP and SOB.  Abdomen distended and firm.  Pt states that it is normally distended but not firm to the touch.  Dressing on RUQ and RLQ from previous peritoneal dialysis site; removed due to peritonitis. Pt states that she recently stopped taking abx from upper respiratory infection.      Triage note:  Chief Complaint   Patient presents with    Vascular Access Problem     L fem perm cath not working, needing dialysis     Review of patient's allergies indicates:   Allergen Reactions    Clindamycin Diarrhea    Flagyl [metronidazole hcl]     Metronidazole      Past Medical History:   Diagnosis Date    Abnormal finding on Pap smear, HPV DNA positive 2014    Anemia associated with chronic renal failure     on Epogen    Blood type B+     Bulging discs - symptomatic      CAD (coronary artery disease)     Cardiomyopathy 3/13/2019    Diabetes mellitus, type 2     ESRD (end stage renal disease) 04/20/2004    FSGS (focal segmental glomerulosclerosis)     with collapsing    Hyperlipidemia     Hypertension 2004    Neuropathy     NSTEMI (non-ST elevated myocardial infarction) 3/29/2019    Obesity     Secondary hyperparathyroidism, renal     TIA (transient ischemic attack)     Uterine fibroid     small uterine        "

## 2019-05-29 NOTE — ASSESSMENT & PLAN NOTE
CAD  HLD  - CT surgery/CABG eval: not a candidate due to comorbidities  - 2d echo from 4/2019 shows EF of 25%, DD, WMA  - Euvolemic on exam  - Denies CP/SOB  - Continue HD for volume removal  - Continue ASA, statin, and plavix daily  - Strict I/Os, daily weights, fluid restriction

## 2019-05-29 NOTE — ASSESSMENT & PLAN NOTE
Anemia in ESRD  - ESRD on HD TT  - Nephrology consulted; see above  - H/H above baseline  - Electrolytes stable  - Renal, DM diet  - Continue home regimen: sensipar 90 mg daily, calcitriol 0.25 mcg daily, renvela 800 mg TIDWM  - Strict I/Os, daily weights  - RFP daily

## 2019-05-29 NOTE — ED PROVIDER NOTES
Encounter Date: 2019    SCRIBE #1 NOTE: I, Son Airam, am scribing for, and in the presence of,  Dr. Brown . I have scribed the following portions of the note - Other sections scribed: MARTA DELA CRUZ  .       History     Chief Complaint   Patient presents with    Vascular Access Problem     L fem perm cath not working, needing dialysis     48 y.o. female with history of CAD, TIA, HTN, HLD, ESRD HD (TTRS)  presenting with left groin  permacath dysfunction. She notes that they had recently changed the permacath last Friday. However, when the patient went to dialysis yesterday, they were unable to access it. She was not dialyzed yesterday and was instructed to report to the ED earlier today to be evaluated. She denies and skin changes to the area. No fever, chills, nausea, vomiting, diarrhea.       The history is provided by the patient and medical records.     Review of patient's allergies indicates:   Allergen Reactions    Clindamycin Diarrhea    Flagyl [metronidazole hcl]     Metronidazole      Past Medical History:   Diagnosis Date    Abnormal finding on Pap smear, HPV DNA positive     Anemia associated with chronic renal failure     on Epogen    Blood type B+     Bulging discs - symptomatic      CAD (coronary artery disease)     Cardiomyopathy 3/13/2019    Diabetes mellitus, type 2     ESRD (end stage renal disease) 2004    FSGS (focal segmental glomerulosclerosis)     with collapsing    Hyperlipidemia     Hypertension     Neuropathy     NSTEMI (non-ST elevated myocardial infarction) 3/29/2019    Obesity     Secondary hyperparathyroidism, renal     TIA (transient ischemic attack)     Uterine fibroid     small uterine      Past Surgical History:   Procedure Laterality Date    ANGIOGRAM, CORONARY ARTERY N/A 4/15/2019    Performed by Roland [facility]ier [facility] at [facility] CATH LAB    CARDIAC CATHETERIZATION      PCI x 2     SECTION, CLASSIC      COLONOSCOPY N/A 2018     Performed by Rodrigue Russell MD at [facility] ENDO (2ND FLR)    DEBRIDEMENT-WOUND Left 4/18/2016    Performed by [facility]ressa Maddox MD at [facility] OR    DIALYSIS FISTULA CREATION      multiple fistulas and grafts before PD     EGD (ESOPHAGOGASTRODUODENOSCOPY) N/A 3/14/2019    Performed by Adolph Davila MD at [facility] ENDO    EGD (ESOPHAGOGASTRODUODENOSCOPY) N/A 3/13/2019    Performed by Adolph Davila MD at [facility] ENDO    INCISION AND DRAINAGE (I&D), LABIAL N/A 4/17/2016    Performed by Harmony Fernandez MD at [facility] OR    INCISION AND DRAINAGE (I&D), LABIAL (ADD ON) Left 4/18/2016    Performed by Teressa Maddox MD at [facility] OR    INCISION AND DRAINAGE OF WOUND Left 2016    VULVAR ABCESS WITH NECROSIS    Insertion, Catheter, Central Venous, Hemodialysis N/A 3/28/2019    Performed by Kimo Weeks MD at [facility] CATH LAB    Insertion, Catheter, Central Venous, Hemodialysis N/A 3/18/2019    Performed by Kimo Weeks MD at [facility] CATH LAB    INSERTION, CATHETER, TUNNELED ABORTED Left 3/14/2019    Performed by Servando Solis MD at [facility] OR    INSERTION, GRAFT, ARTERIOVENOUS, RIGHT ARM Right 3/22/2019    Performed by BESSIE Wynn III, MD at [facility] OR 2ND FLR    INSERTION, INTRA-AORTIC BALLOON PUMP  4/15/2019    Performed by Roland Napier MD at [facility] CATH LAB    Left heart cath Left 4/15/2019    Performed by Roland Napier MD at [facility] CATH LAB    ORIF, HIP Left 9/13/2018    Performed by Tevin Grullon MD at [facility] OR    PERITONEAL CATHETER INSERTION      PERMCATH REWIRE- TUNNELED CATH REWIRE Left 11/13/2017    Performed by Baldev Munroe MD at [facility] CATH LAB    PERMCATH REWIRE- TUNNELED CATH REWIRE N/A 10/5/2017    Performed by Baldev Munroe MD at [facility] CATH LAB    REMOVAL, CATHETER, DIALYSIS, PERITONEAL N/A 3/14/2019    Performed by Servando Solis MD at [facility] OR    REPLACEMENT, CATHETER, DIALYSIS, OVER GUIDEWIRE, USING EXISTING VENOUS ACCESS Left 5/24/2019    Performed by Baldev  MD Shaneka at [facility] CATH LAB    UMBILICAL HERNIA REPAIR      Venogram, possible intervention Right 3/20/2019    Performed by BESSIE Wynn III, MD at [facility] CATH LAB     Family History   Problem Relation Age of Onset    Cancer Maternal Grandmother     Cancer Paternal Grandfather     Diabetes Maternal Aunt     Diabetes Paternal Aunt     Kidney disease Neg Hx     Heart disease Neg Hx     Ovarian cancer Neg Hx     Breast cancer Neg Hx      Social History     Tobacco Use    Smoking status: Never Smoker    Smokeless tobacco: Never Used   Substance Use Topics    Alcohol use: No     Comment: Pt reports some social use of about 1-2 drinks about every six months.    Drug use: No     Review of Systems   Constitutional: Negative for chills and fever.   HENT: Negative for congestion and rhinorrhea.    Eyes: Negative for photophobia and visual disturbance.   Respiratory: Negative for cough and shortness of breath.    Cardiovascular: Negative for chest pain and leg swelling.   Gastrointestinal: Negative for abdominal pain and diarrhea.   Musculoskeletal: Negative for back pain and neck pain.   Skin: Negative for color change and wound.   Neurological: Negative for speech difficulty and headaches.   Psychiatric/Behavioral: Negative for confusion and suicidal ideas.       Physical Exam     Initial Vitals [05/29/19 1344]   BP Pulse Resp Temp SpO2   103/68 92 18 98.4 °F (36.9 °C) 99 %      MAP       --         Physical Exam    Nursing note and vitals reviewed.    Gen/Constitutional: Interactive. No acute distress  Head: Normocephalic, Atraumatic  Neck: supple, no masses or LAD  Eyes: PERRLA, conjunctiva clear  Ears, Nose and Throat: No rhinorrhea or stridor.  Cardiac: Reg Rhythm, No murmur  Pulmonary: CTA Bilat, no wheezes, rhonchi, rales.  GI: Abdomen soft, non-tender, slightly distended; no rebound or guarding  : No CVA tenderness, left groin permacath with no signs of infection, discharge or  drainage.  Musculoskeletal: Extremities warm, well perfused, no erythema, no edema,   Skin: No rashes  Neuro: Alert and Oriented x 3; No focal motor or sensory deficits.    Psych: Normal affect      ED Course   Procedures  Labs Reviewed   CBC W/ AUTO DIFFERENTIAL - Abnormal; Notable for the following components:       Result Value    WBC 13.38 (*)     RBC 2.54 (*)     Hemoglobin 8.0 (*)     Hematocrit 27.1 (*)     Mean Corpuscular Volume 107 (*)     Mean Corpuscular Hemoglobin 31.5 (*)     Mean Corpuscular Hemoglobin Conc 29.5 (*)     RDW 23.9 (*)     Platelets 432 (*)     Gran # (ANC) 7.9 (*)     All other components within normal limits   POCT GLUCOSE - Abnormal; Notable for the following components:    POC Glucose 146 (*)     POC BUN 38 (*)     POC Creatinine 8.3 (*)     POC TCO2 (MEASURED) 31 (*)     POC Ionized Calcium 0.96 (*)     POC Hematocrit 26 (*)     All other components within normal limits   ISTAT CHEM8   POCT GLUCOSE MONITORING CONTINUOUS     EKG Readings: (Independently Interpreted)   Initial Reading: No STEMI. Previous EKG: Compared with most recent EKG Heart Rate: 84.   Heart rate of 84, nonspecific T-wave abnormality, rightward axis     ECG Results          EKG 12-lead (Final result)  Result time 05/29/19 15:10:40    Final result by Interface, Lab In [facility] (05/29/19 15:10:40)                 Narrative:    Test Reason : Z99.2,    Vent. Rate : 084 BPM     Atrial Rate : 084 BPM     P-R Int : 186 ms          QRS Dur : 084 ms      QT Int : 360 ms       P-R-T Axes : 083 103 200 degrees     QTc Int : 425 ms      Normal sinus rhythm  Rightward axis  Nonspecific intraventricular conduction delay  Nonspecific T wave abnormality  Abnormal ECG  When compared with ECG of 18-APR-2019 13:52,  No significant change was found  Confirmed by MARIA D CANNON MD ([id number]) on 5/29/2019 3:10:28 PM    Referred By:             Confirmed By:MARIA D CANNON MD                            Imaging Results    None          Medical  Decision Making:   History:   Old Medical Records: I decided to obtain old medical records.  Old Records Summarized: records from clinic visits and records from previous admission(s).  Initial Assessment:   48 y.o. female with history of CAD, TIA, HTN, HLD, ESRD HD (TTRS)  presenting with  left groin permacath dysfunction  Differential Diagnosis:   Differential diagnosis includes but is not limited to:  PermCath clotted off, PermCath displacement, need for dialysis, poor flow  Independently Interpreted Test(s):   I have ordered and independently interpreted EKG Reading(s) - see prior notes  Clinical Tests:   Lab Tests: Ordered and Reviewed  Medical Tests: Ordered and Reviewed  Other:   I have discussed this case with another health care provider.       <> Summary of the Discussion: Nephrology     Emergent evaluation of a patient presenting with dialysis catheter access issue.  She is afebrile and VS are stable. Physical exam findings remarkable for left groin PermCath without signs of infection, cardiac exam stable, lung exam without acute findings, abdominal exam is without peritoneal signs. Will obtain ECG, labs, nephrology consult.  ECG with no signs of ischemia, arrhythmia or acute findings, on my read.  Patient recently dialyzed with difficulty getting sustained flow out of PermCath currently in the left groin.  Patient has significant vascular disease with poor access points.  Discussed case with Nephrology, who will order cath flow to attempt to open and insure adequate dialysis flow.  Discussed case with hospital medicine, patient will be admitted to observation overnight.  Patient is a difficult vascular access, she has extensive vascular history.  Patient has a peritoneal dialysis catheter placement appointment with Dr. Nagel and tomorrow at 6:45 p.m. the plan is to admit the patient overnight in observation to allow CathFlo to do well for a minimum of 4-6 hours.  Per Nephrology, will plan for dialysis  tonight to assess flows.  If the catheter does not work properly after the Cathflo, the plan is to have nephrology access service, Dr. Weeks evaluate for rewire.  At this time do not suspect any infectious etiology, worsening uremia, acute electrolyte abnormality or hemodynamic instability.    Complexity:  High - level 5            Scribe Attestation:   Scribe #1: I performed the above scribed service and the documentation accurately describes the services I performed. I attest to the accuracy of the note.    I, Dr. Wilson Brown, personally performed the services described in this documentation. All medical record entries made by the scribe were at my direction and in my presence.  I have reviewed the chart and agree that the record reflects my personal performance and is accurate and complete.              Clinical Impression:       ICD-10-CM ICD-9-CM   1. Complication of vascular dialysis catheter, unspecified complication, initial encounter T82.9XXA 996.1   2. Dialysis patient Z99.2 V45.11   3. ESRD (end stage renal disease) N18.6 585.6   4. Other complication of vascular dialysis catheter, initial encounter T82.49XA 996.73         Disposition:   Disposition: Placed in Observation  Condition: Fair         Wilson Brown DO  Dept of Emergency Medicine   [facility]  Spectralink: [id number]                 Wilson Brown DO  05/29/19 1939

## 2019-05-29 NOTE — PROGRESS NOTES
Clots aspirated out of both ports of left femoral permcath. Both ports aspirated and flushed with little resistance. Cath fanny instilled to each port. Catheter clamped, capped, taped, and labeled.

## 2019-05-29 NOTE — HPI
Ms. Jenni Todd is a 47 yo AAF well known to our service.  She is an ESRD patient with multiple failed vascular access who presents to the ED on 5/29 for evaluation of malfunctioning hemodialysis catheter.  She was recently converted to hemodialysis from peritoneal dialysis for recurrent peritonitis earlier this year.  She has had extensive attempts and placement of hemodialysis tunneled catheters, but due to occlusion of her RIJ and thrombus in her LIJ.  A Tunneled dialysis catheter was succefully placed by Dr. Weeks on 3/28, which she reports was working fine until last Thursday (8/23). She reports during this treatment, they were having a lot of low blood flow rates, so she was referred to Dr. Munroe at [facility] for re-wire.  She underwent successful re-wire and had HD post with adequate blood flow rates and her treatment on Saturday was also noted to have good flows.  On Tuesday, she reports that the catheter was unable to be used, noted to have poor flows and her treatment had to be discontinued.  No Cathflo was administered because the clinic was out, so she was referred here for further evaluation.  Of note, she has been following with Dr. Alonso for future PD catheter placement, appointment scheduled for tomorrow.

## 2019-05-29 NOTE — ASSESSMENT & PLAN NOTE
Neuropathy  -  on admit  - A1c pending  - Renal, DM diet  - Low dose SSI  - Monitor BGs and adjust insulin PRN  - Continue home gabapentin

## 2019-05-30 NOTE — HOSPITAL COURSE
Mr. Todd was admitted to observation for L femoral dialysis catheter dysfunction. Nephrology consulted. Cathflo instilled for four hours, HD given but flow sluggish and had to be de-clotted. Dr. Weeks evaluated patient, no re-wiring at this time, but recommending cathflo to tip of catheter until Saturday HD as she likely has some fibrin clots. Patient discharged to make appointment with Dr. Alonso in surgery to discuss PD. Follow up with PCP in 1 week. Patient verbalized understanding. All questions answered.

## 2019-05-30 NOTE — NURSING
Pt discharged from OBS unit via w/c. Pt accompanied by transport associate and father. No distress noted. All personal belongings taken home by pt.

## 2019-05-30 NOTE — PROGRESS NOTES
[facility]  Nephrology  Progress Note    Patient Name: Jenni Todd  MRN: [id number]  Admission Date: 5/29/2019  Hospital Length of Stay: 0 days  Attending Provider: Fiordaliza Llamas MD   Primary Care Physician: Michael Tan Iii, MD  Principal Problem:Complication of vascular dialysis catheter    Subjective:     HPI: Ms. Jenni Todd is a 49 yo AAF well known to our service.  She is an ESRD patient with multiple failed vascular access who presents to the ED on 5/29 for evaluation of malfunctioning hemodialysis catheter.  She was recently converted to hemodialysis from peritoneal dialysis for recurrent peritonitis earlier this year.  She has had extensive attempts and placement of hemodialysis tunneled catheters, but due to occlusion of her RIJ and thrombus in her LIJ.  A Tunneled dialysis catheter was succefully placed by Dr. Weeks on 3/28, which she reports was working fine until last Thursday (8/23). She reports during this treatment, they were having a lot of low blood flow rates, so she was referred to Dr. Munroe at [facility] for re-wire.  She underwent successful re-wire and had HD post with adequate blood flow rates and her treatment on Saturday was also noted to have good flows.  On Tuesday, she reports that the catheter was unable to be used, noted to have poor flows and her treatment had to be discontinued.  No Cathflo was administered because the clinic was out, so she was referred here for further evaluation.  Of note, she has been following with Dr. Alonso for future PD catheter placement, appointment scheduled for tomorrow.    Interval History:   HD completed early this morning after cathflo dwell.  Noted to have BFR of 300 ml/min and elevated arterial/venous pressures.  Remains NPO for possible permacath exchange.    Review of patient's allergies indicates:   Allergen Reactions    Clindamycin Diarrhea    Flagyl [metronidazole hcl]     Metronidazole      Current  Facility-Administered Medications   Medication Frequency    acetaminophen tablet 650 mg Q6H PRN    aspirin EC tablet 81 mg Daily    atorvastatin tablet 40 mg QHS    calcitRIOL capsule 0.5 mcg Daily    calcium carbonate (OS-PETEY) tablet 500 mg Daily    cetirizine tablet 5 mg Daily    cinacalcet tablet 90 mg Daily    clopidogrel tablet 75 mg Daily    dextrose 50% injection 12.5 g PRN    dextrose 50% injection 25 g PRN    ferrous sulfate EC tablet 325 mg BID    gabapentin capsule 300 mg BID    glucagon (human recombinant) injection 1 mg PRN    glucose chewable tablet 16 g PRN    glucose chewable tablet 24 g PRN    magnesium oxide tablet 400 mg TID    midodrine tablet 10 mg TID WM    ondansetron disintegrating tablet 8 mg Q8H PRN    ondansetron injection 4 mg Q8H PRN    pantoprazole EC tablet 40 mg Daily    sevelamer carbonate tablet 800 mg TID WM    sodium chloride 0.9% flush 3 mL Q8H       Objective:     Vital Signs (Most Recent):  Temp: 97.7 °F (36.5 °C) (05/30/19 0733)  Pulse: 80 (05/30/19 0733)  Resp: 18 (05/30/19 0733)  BP: 119/76 (05/30/19 0733)  SpO2: 100 % (05/30/19 0733)  O2 Device (Oxygen Therapy): room air (05/30/19 0733) Vital Signs (24h Range):  Temp:  [97.5 °F (36.4 °C)-98.8 °F (37.1 °C)] 97.7 °F (36.5 °C)  Pulse:  [74-92] 80  Resp:  [18-24] 18  SpO2:  [97 %-100 %] 100 %  BP: ()/(45-88) 119/76     Weight: 97.2 kg (214 lb 4.6 oz) (05/29/19 1734)  Body mass index is 33.07 kg/m².  Body surface area is 2.15 meters squared.    I/O last 3 completed shifts:  In: 600 [Other:600]  Out: 3000 [Other:3000]    Physical Exam   Constitutional: She is oriented to person, place, and time. She appears well-developed. No distress.   HENT:   Head: Normocephalic and atraumatic.   Right Ear: External ear normal.   Left Ear: External ear normal.   Eyes: Conjunctivae and EOM are normal. Right eye exhibits no discharge. Left eye exhibits no discharge. No scleral icterus.   Neck: Normal range of motion.  Neck supple.   Cardiovascular: Normal rate and regular rhythm. Exam reveals no gallop and no friction rub.   No murmur heard.  Pulmonary/Chest: Effort normal and breath sounds normal. No respiratory distress. She has no wheezes. She has no rales.   Abdominal: Soft. Bowel sounds are normal. She exhibits distension. There is no tenderness.   Musculoskeletal: Normal range of motion. She exhibits no edema or deformity.   Neurological: She is alert and oriented to person, place, and time.   Skin: Skin is warm and dry. She is not diaphoretic.   L femoral tunneled catheter   Psychiatric: She has a normal mood and affect. Her behavior is normal.       Significant Labs:  CBC:   Recent Labs   Lab 05/30/19  0558   WBC 11.14   RBC 2.61*   HGB 7.9*   HCT 27.6*      *   MCH 30.3   MCHC 28.6*     CMP:   Recent Labs   Lab 05/29/19  1517 05/30/19  0558   * 129*   CALCIUM 8.7 8.6*   ALBUMIN 2.7* 2.3*   PROT 8.8*  --     140   K 5.2* 4.6   CO2 27 26    103   BUN 33* 19   CREATININE 8.3* 5.4*   ALKPHOS 143*  --    ALT 15  --    AST 25  --    BILITOT 0.2  --             Assessment/Plan:     ESRD (end stage renal disease) on dialysis  ESRD on iHD TTS  Last treatment on Saturday, unable to be dialyzed 2/2 L femoral tunneled catheter dysfunction  Catheter was re-wired last Friday (5/23).    Plan/Recommendations:  -discussed case with Dr. Weeks ([facility]) and at this time, he feels that she likely has some fibrin clots remaining on the tip of her catheter.  He recommends instilling cathflo again to tip of the catheter to dwell until her next dialysis appointment on Saturday.  -at this time, he doesn't feel it is warranted to replace the line at this time.  Will likely need to increase her treatment times at the outside dialysis unit to achieve adequacy, but this will be deferred to her OP Nephrologist.  -will have dialysis nurse instill Cathflo  -from a renal standpont, can be discharged so she can make her  appointment with Dr. Alonso later today.  -Hopeful she will be able to go back on PD and this catheter can be maintained until PD is initiated.        Andrew Ragsdale NP  Nephrology  [facility]

## 2019-05-30 NOTE — ASSESSMENT & PLAN NOTE
ESRD on iHD TTS  Last treatment on Saturday, unable to be dialyzed 2/2 L femoral tunneled catheter dysfunction  Catheter was re-wired last Friday (5/23).    Plan/Recommendations:  -discussed case with Dr. Weeks ([facility]) and at this time, he feels that she likely has some fibrin clots remaining on the tip of her catheter.  He recommends instilling cathflo again to tip of the catheter to dwell until her next dialysis appointment on Saturday.  -at this time, he doesn't feel it is warranted to replace the line at this time.  Will likely need to increase her treatment times at the outside dialysis unit to achieve adequacy, but this will be deferred to her OP Nephrologist.  -will have dialysis nurse instill Cathflo  -from a renal standpont, can be discharged so she can make her appointment with Dr. Alonso later today.  -Hopeful she will be able to go back on PD and this catheter can be maintained until PD is initiated.

## 2019-05-30 NOTE — PROGRESS NOTES
History & Physical    SUBJECTIVE:     History of Present Illness:  Patient is a 48 y.o. female presents with ESRD on dialysis.  She started on hemo but went to PD for 14 years which had to be removed due to peritonitis.  She has had 2 PDs placed and thinks the first one was also removed for infection.  She has also had csection and umbilical hernia repair.  Since then she has had many fistulas which has clotted off and now had a catheter in her femoral vessel.  She went to the hospital last night due to trouble with dialysis with this catheter which was improved with cathflo.      Chief Complaint   Patient presents with    Follow-up       Review of patient's allergies indicates:   Allergen Reactions    Clindamycin Diarrhea    Flagyl [metronidazole hcl]     Metronidazole        Current Outpatient Medications   Medication Sig Dispense Refill    acetaminophen (TYLENOL) 325 MG tablet Take 650 mg by mouth every 6 (six) hours as needed for Pain.      aspirin (ECOTRIN) 81 MG EC tablet Take 1 tablet (81 mg total) by mouth once daily. 30 tablet 12    atorvastatin (LIPITOR) 40 MG tablet Take 40 mg by mouth every evening.       calcitRIOL (ROCALTROL) 0.25 MCG Cap Take 0.5 mcg by mouth once daily.       calcium carbonate (CALCI-CHEW) 500 mg calcium (1,250 mg) chewable tablet Take 500 mg by mouth.      cetirizine (ZYRTEC) 5 MG tablet Take 1 tablet (5 mg total) by mouth once daily.  0    cinacalcet (SENSIPAR) 90 MG Tab Take 90 mg by mouth once daily.      ciprofloxacin HCl (CIPRO) 500 MG tablet Take 1 tablet (500 mg total) by mouth once daily. for 10 days 10 tablet 0    clopidogrel (PLAVIX) 75 mg tablet Take 1 tablet (75 mg total) by mouth once daily. 30 tablet 11    ferrous sulfate 325 (65 FE) MG EC tablet Take 1 tablet (325 mg total) by mouth 2 (two) times daily.  0    gabapentin (NEURONTIN) 300 MG capsule Take 1 capsule (300 mg total) by mouth 2 (two) times daily. 60 capsule 2    insulin aspart U-100 (NOVOLOG)  "100 unit/mL (3 mL) InPn pen Inject 0-5 Units into the skin before meals and at bedtime as needed (Hyperglycemia). 6 mL 2    magnesium oxide (MAG-OX) 400 mg (241.3 mg magnesium) tablet Take 1 tablet (400 mg total) by mouth 3 (three) times daily.  0    midodrine (PROAMATINE) 10 MG tablet Take 1 tablet (10 mg total) by mouth 3 (three) times daily with meals. 90 tablet 2    ONETOUCH VERIO Strp USE 1 STRIP ONCE DAILY IN VITRO  3    pantoprazole (PROTONIX) 40 MG tablet Take 1 tablet (40 mg total) by mouth once daily. 30 tablet 11    pen needle, diabetic 31 gauge x 3/16" Ndle 1 each by Misc.(Non-Drug; Combo Route) route 4 (four) times daily before meals and nightly. 100 each 5    senna-docusate 8.6-50 mg (PERICOLACE) 8.6-50 mg per tablet Take 1 tablet by mouth 2 (two) times daily as needed for Constipation.      sevelamer carbonate (RENVELA) 800 mg Tab Take 1 tablet (800 mg total) by mouth 3 (three) times daily with meals. 90 tablet 11    vitamin renal formula, B-complex-vitamin c-folic acid, (B COMPLEX-C-FOLIC ACID) 1 mg Cap Take 1 capsule by mouth once daily. 1 Capsule Oral Every day       No current facility-administered medications for this visit.        Past Medical History:   Diagnosis Date    Abnormal finding on Pap smear, HPV DNA positive 2014    Anemia associated with chronic renal failure     on Epogen    Blood type B+     Bulging discs - symptomatic      CAD (coronary artery disease)     Cardiomyopathy 3/13/2019    Diabetes mellitus, type 2     ESRD (end stage renal disease) 04/20/2004    FSGS (focal segmental glomerulosclerosis)     with collapsing    Hyperlipidemia     Hypertension 2004    Neuropathy     NSTEMI (non-ST elevated myocardial infarction) 3/29/2019    Obesity     Secondary hyperparathyroidism, renal     TIA (transient ischemic attack)     Uterine fibroid     small uterine      Past Surgical History:   Procedure Laterality Date    ANGIOGRAM, CORONARY ARTERY N/A 4/15/2019    " Performed by Roland Napier MD at [facility] CATH LAB    CARDIAC CATHETERIZATION      PCI x 2     SECTION, CLASSIC      COLONOSCOPY N/A 2018    Performed by Rodrigue Russell MD at [facility] ENDO (2ND FLR)    DEBRIDEMENT-WOUND Left 2016    Performed by Teressa Maddox MD at [facility] OR    DIALYSIS FISTULA CREATION      multiple fistulas and grafts before PD     EGD (ESOPHAGOGASTRODUODENOSCOPY) N/A 3/14/2019    Performed by Adolph Davila MD at [facility] ENDO    EGD (ESOPHAGOGASTRODUODENOSCOPY) N/A 3/13/2019    Performed by Adolph Davila MD at [facility] ENDO    INCISION AND DRAINAGE (I&D), LABIAL N/A 2016    Performed by Harmony Fernandez MD at [facility] OR    INCISION AND DRAINAGE (I&D), LABIAL (ADD ON) Left 2016    Performed by Teressa Maddox MD at [facility] OR    INCISION AND DRAINAGE OF WOUND Left     VULVAR ABCESS WITH NECROSIS    Insertion, Catheter, Central Venous, Hemodialysis N/A 3/28/2019    Performed by Kimo Weeks MD at [facility] CATH LAB    Insertion, Catheter, Central Venous, Hemodialysis N/A 3/18/2019    Performed by Kimo Weeks MD at [facility] CATH LAB    INSERTION, CATHETER, TUNNELED ABORTED Left 3/14/2019    Performed by Servando Solis MD at [facility] OR    INSERTION, GRAFT, ARTERIOVENOUS, RIGHT ARM Right 3/22/2019    Performed by BESSIE Wynn III, MD at [facility] OR 2ND FLR    INSERTION, INTRA-AORTIC BALLOON PUMP  4/15/2019    Performed by Roland Napier MD at [facility] CATH LAB    Left heart cath Left 4/15/2019    Performed by Roland Napier MD at [facility] CATH LAB    ORIF, HIP Left 2018    Performed by Tevin Grullon MD at [facility] OR    PERITONEAL CATHETER INSERTION      PERMCATH REWIRE- TUNNELED CATH REWIRE Left 2017    Performed by Baldev Munroe MD at [facility] CATH LAB    PERMCATH REWIRE- TUNNELED CATH REWIRE N/A 10/5/2017    Performed by Baldev Munroe MD at [facility] CATH LAB    REMOVAL, CATHETER, DIALYSIS, PERITONEAL N/A 3/14/2019    Performed by  "Servando Solis MD at [facility] OR    REPLACEMENT, CATHETER, DIALYSIS, OVER GUIDEWIRE, USING EXISTING VENOUS ACCESS Left 5/24/2019    Performed by [facility] at [facility] CATH LAB    UMBILICAL HERNIA REPAIR      Venogram, possible intervention Right 3/20/2019    Performed by B[facility]IE Wynn III, MD at [facility] CATH LAB     Family History   Problem Relation Age of Onset    Cancer Maternal Grandmother     Cancer Paternal Grandfather     Diabetes Maternal Aunt     Diabetes Paternal Aunt     Kidney disease Neg Hx     Heart disease Neg Hx     Ovarian cancer Neg Hx     Breast cancer Neg Hx      Social History     Tobacco Use    Smoking status: Never Smoker    Smokeless tobacco: Never Used   Substance Use Topics    Alcohol use: No     Comment: Pt reports some social use of about 1-2 drinks about every six months.    Drug use: No        Review of Systems:  Review of Systems   Constitutional: Negative for fever and unexpected weight change.   HENT: Negative for sinus pressure and sinus pain.    Respiratory: Negative for cough, chest tightness and shortness of breath.         Had recent uri treated with antibiotics   Cardiovascular: Negative for chest pain and palpitations.   Gastrointestinal: Negative for abdominal pain, constipation, diarrhea, nausea and vomiting.   Genitourinary:        Doesn't urinate   Musculoskeletal: Negative for back pain and neck pain.   Skin: Negative for rash and wound.       OBJECTIVE:     Vital Signs (Most Recent)  Pulse: 91 (05/30/19 1845)  BP: (!) 89/59 (05/30/19 1845)  5' 7.5" (1.715 m)  95.7 kg (211 lb)     Physical Exam:  Physical Exam   Constitutional: She is oriented to person, place, and time. She appears well-developed and well-nourished.   Abdominal: She exhibits distension.       Neurological: She is alert and oriented to person, place, and time.   Skin: Skin is warm and dry.   Psychiatric: She has a normal mood and affect. Her behavior is normal. Judgment and thought " content normal.   Vitals reviewed.      Laboratory  CBC: Reviewed  CMP: Reviewed    Diagnostic Results:  US: Reviewed  CT: Reviewed    ASSESSMENT/PLAN:     Poor vascular access on hemodialysis for esrd.  Distended tense abdomen.  She stopped her plavix a week ago.    PLAN:       Obtain ct.  Consider diagnostic laparoscopy with possible pd placement.  Will need cardiology clearance.

## 2019-05-30 NOTE — PROGRESS NOTES
Alteplase 6mg total , 3 mg instilled to each port of femoral cvc. To dwell prior to in center dialysis scheduled Saturday 6/1/19, orange medication added sticker applied to site. Pt verbalizes understanding.

## 2019-05-30 NOTE — PLAN OF CARE
SW assigned to case today 5/30/2019. SW will assist team with DC needs. SW in communication with Ish FARR.    No SW needs identified at this time.      05/30/19 1107   Post-Acute Status   Post-Acute Authorization Other   Other Status No Post-Acute Service Needs     Mary Cantrell LMSW  [facility]  [id number]

## 2019-05-30 NOTE — NURSING
Katyln NINA notified that pt vomit approx 300cc. Pt stated she felt like tylenol got stuck and caused her to vomit. Pt with no nausea post. No blood noted in vomit. Pt able to tolerate water and cracker. Ok to d/c home per Katlyn.

## 2019-05-30 NOTE — ASSESSMENT & PLAN NOTE
ESRD on HD TTHS  - Previously PD patient; PD catheter removed in 2/2019 after recurrent peritonitis and patient was transitioned to HD  - Bilateral IJ tunneled catheters were attempted without success due to thrombus  - Currently has temporary catheter in L fem (placed 3/28 and re-wired 5/23)  - Last dialyzed Saturday, outpatient dialysis center unable to use L femoral catheter yesterday, 5/28  - Nephrology consulted; plan to administer Cathflo to hemodialysis catheter to dwell for a minimum of 4 hours and dialysis tonight to assess flows  - Per nephrology: if adequate flows and able to tolerate dialysis, recommend discharge tomorrow so patient can make her Gen Surg appointment with Dr. Alonso (5/30). IF catheter does not work properly after cathflo, please make patient NPO after midnight and we will have Dr. Weeks ([facility]) evaluate for re-wire. Either way, would like patient to be discharged so she can make her appointment with Dr. Alonso, as PD will be her best option for long term dialysis given her extensive vascular history.  - NPO at [place] as precaution  - patient able to receive HD, but flow sluggish and had to be de-clotted. Dr. Weeks evaluated patient, recommend instilling cathflo to tip of catheter until HD on Saturday as patient likely has some fibrin clots. No need for re-wiring at this time. Will likely need to increase treatment times at outside dialysis to achieve adequecy, will defer to OP nephrologist  -patient discharged to make Dr. Alonso appointment  - PCP follow up in 1 week

## 2019-05-30 NOTE — SUBJECTIVE & OBJECTIVE
Interval History:   HD completed early this morning after cathflo dwell.  Noted to have BFR of 300 ml/min and elevated arterial/venous pressures.  Remains NPO for possible permacath exchange.    Review of patient's allergies indicates:   Allergen Reactions    Clindamycin Diarrhea    Flagyl [metronidazole hcl]     Metronidazole      Current Facility-Administered Medications   Medication Frequency    acetaminophen tablet 650 mg Q6H PRN    aspirin EC tablet 81 mg Daily    atorvastatin tablet 40 mg QHS    calcitRIOL capsule 0.5 mcg Daily    calcium carbonate (OS-PETEY) tablet 500 mg Daily    cetirizine tablet 5 mg Daily    cinacalcet tablet 90 mg Daily    clopidogrel tablet 75 mg Daily    dextrose 50% injection 12.5 g PRN    dextrose 50% injection 25 g PRN    ferrous sulfate EC tablet 325 mg BID    gabapentin capsule 300 mg BID    glucagon (human recombinant) injection 1 mg PRN    glucose chewable tablet 16 g PRN    glucose chewable tablet 24 g PRN    magnesium oxide tablet 400 mg TID    midodrine tablet 10 mg TID WM    ondansetron disintegrating tablet 8 mg Q8H PRN    ondansetron injection 4 mg Q8H PRN    pantoprazole EC tablet 40 mg Daily    sevelamer carbonate tablet 800 mg TID WM    sodium chloride 0.9% flush 3 mL Q8H       Objective:     Vital Signs (Most Recent):  Temp: 97.7 °F (36.5 °C) (05/30/19 0733)  Pulse: 80 (05/30/19 0733)  Resp: 18 (05/30/19 0733)  BP: 119/76 (05/30/19 0733)  SpO2: 100 % (05/30/19 0733)  O2 Device (Oxygen Therapy): room air (05/30/19 0733) Vital Signs (24h Range):  Temp:  [97.5 °F (36.4 °C)-98.8 °F (37.1 °C)] 97.7 °F (36.5 °C)  Pulse:  [74-92] 80  Resp:  [18-24] 18  SpO2:  [97 %-100 %] 100 %  BP: ()/(45-88) 119/76     Weight: 97.2 kg (214 lb 4.6 oz) (05/29/19 1734)  Body mass index is 33.07 kg/m².  Body surface area is 2.15 meters squared.    I/O last 3 completed shifts:  In: 600 [Other:600]  Out: 3000 [Other:3000]    Physical Exam   Constitutional: She is  oriented to person, place, and time. She appears well-developed. No distress.   HENT:   Head: Normocephalic and atraumatic.   Right Ear: External ear normal.   Left Ear: External ear normal.   Eyes: Conjunctivae and EOM are normal. Right eye exhibits no discharge. Left eye exhibits no discharge. No scleral icterus.   Neck: Normal range of motion. Neck supple.   Cardiovascular: Normal rate and regular rhythm. Exam reveals no gallop and no friction rub.   No murmur heard.  Pulmonary/Chest: Effort normal and breath sounds normal. No respiratory distress. She has no wheezes. She has no rales.   Abdominal: Soft. Bowel sounds are normal. She exhibits distension. There is no tenderness.   Musculoskeletal: Normal range of motion. She exhibits no edema or deformity.   Neurological: She is alert and oriented to person, place, and time.   Skin: Skin is warm and dry. She is not diaphoretic.   L femoral tunneled catheter   Psychiatric: She has a normal mood and affect. Her behavior is normal.       Significant Labs:  CBC:   Recent Labs   Lab 05/30/19  0558   WBC 11.14   RBC 2.61*   HGB 7.9*   HCT 27.6*      *   MCH 30.3   MCHC 28.6*     CMP:   Recent Labs   Lab 05/29/19  1517 05/30/19  0558   * 129*   CALCIUM 8.7 8.6*   ALBUMIN 2.7* 2.3*   PROT 8.8*  --     140   K 5.2* 4.6   CO2 27 26    103   BUN 33* 19   CREATININE 8.3* 5.4*   ALKPHOS 143*  --    ALT 15  --    AST 25  --    BILITOT 0.2  --

## 2019-05-30 NOTE — PLAN OF CARE
Problem: Adult Inpatient Plan of Care  Goal: Plan of Care Review  Outcome: Outcome(s) achieved Date Met: 05/30/19  Pt with no falls or injuries this shift. Pt ambulating in room with minimal assist 1. Pt afebrile, no s/s infection. Pt with no hypo or hyperglycemia noted this hospital stay. Dialysis site intact to left groin.

## 2019-05-30 NOTE — PLAN OF CARE
Problem: Adult Inpatient Plan of Care  Goal: Plan of Care Review  Outcome: Ongoing (interventions implemented as appropriate)  Patient received HD overnight thru permacath. Hypotensive in low 90s/50s post dialysis, but asymptomatic. Patient educated on possible hypotensive symptoms and verbalized understanding on importance to report if symptoms begin. NPO since 0000 for possible new vascular access placement in AM. Will continue to monitor vitals.

## 2019-05-30 NOTE — PROGRESS NOTES
Patient was dialyzed for 3hrs with 2L net uf removal.    Permcath lumens has to reversed to get a BFR of 300cc/min with arterial pressures -240 to -250. Did power flush blue port several times to be able to keep a good flow. Red port still sluggish even with several flushes.    There was also some episodes of hypotension with SBP at 80's.    Clotted circuit after the 1st hr of HD, then restarted.    Blood rinsed back, catheter flushed and secured with cap.    SBP post HD at 90's MAP 70mmHg. Asymptomatic. Kept patient rested and comfortable.

## 2019-05-30 NOTE — PROGRESS NOTES
Received patient awake alert and oriented, on room air not in cp distress. Appears to be overloaded, no HD since Saturday due to poor access flow. (+) Globular abdomen, non productive cough and facial edema    Permcath on left femoral accessed post TPA dwell. Both lumens with sluggish flows, able to have BFR of 200cc/min with arterial pressures at -230mmHg.      UF set to 3L, prof 2 to aid with high amount of fluid removal during the 1st hr. Bp initially at 130's and will adjust UF accdg to patient's V/s

## 2019-05-30 NOTE — NURSING
Order to d/c home today. Saline lock removed. VSS. Discharge instructions given to pt. Pt verbalized understanding. Pt awaiting transport associate for discharge. Father in with pt.

## 2019-05-30 NOTE — LETTER
[facility] - General Surgery  [address]  [facility] [address]  Phone: [phone] May 30, 2019      Michael Tan III, MD  [address]  [address] [address]    Patient: Jenni Todd   MR Number: [id number]   YOB: 1970   Date of Visit: 5/30/2019     Dear Dr. Tan:    Thank you for referring Jenni Todd to me for evaluation. Attached you will find relevant portions of my assessment and plan of care.    Ms. Todd presents with poor vascular access on hemodialysis for ESRD and distended tense abdomen.  She stopped her Plavix a week ago.    We will obtain CT. We will consider diagnostic laparoscopy with possible PD placement.  We will need cardiology clearance.  I think it is unlikely PD is possible, in part due to ascites but also due to adhesions.    If you have questions, please do not hesitate to call me. I look forward to following Jenni Tdod along with you.    Sincerely,      Sung Alonso MD  Professor, [employer]  Section Head, General Surgery  [facility]    WSR/afw    CC  Gurvinder Watt MD

## 2019-05-30 NOTE — DISCHARGE SUMMARY
[facility] Hospital Medicine  Discharge Summary      Patient Name: Jenni Todd  MRN: [id number]  Admission Date: 5/29/2019  Hospital Length of Stay: 0 days  Discharge Date and Time: 5/30/2019  1:58 PM  Attending Physician: Xin att. providers found   Discharging Provider: Katlyn Montoya PA-C  Primary Care Provider: Michael Tan Iii, MD  [facility] Medicine Team: [facility] Katlyn Montoya PA-C    HPI:   48 year old female with a PMHx of ESRD on HD TTHS, anemia in ESRD, DM2 with neuropathy, CAD, HLD, HFrEF presenting to the ER due to malfunctioning HD catheter. Patient was converted to hemodialysis in February 2019 after recurrent peritonitis. She has had multiple attempts and placements of tunneled catheters d/t occlusion of RIJ and thrombus in LIJ. Tunneled dialysis catheter to L fem was placed 3/28 and was re-wired 5/23. HD center was unable to use catheter on Tuesday, May 28 and noted to have poor flows. At the time of my exam, patient has no complaints. She denies CP, SOB, abdominal pain, N/V/D, fever/chills, recent illness, BRBPR/melena. She endorses compliance with home medications and fluid restriction.    Of note, she has been following with Dr. Alonso for future PD catheter placement has an appointment scheduled for tomorrow.      In ED, HDS on admission. ISTAT shows stable electrolytes. CBC and CMP pending. Nephrology consulted in the ER.     * No surgery found *      Hospital Course:   Mr. Todd was admitted to observation for L femoral dialysis catheter dysfunction. Nephrology consulted. Cathflo instilled for four hours, HD given but flow sluggish and had to be de-clotted. Dr. Weeks evaluated patient, no re-wiring at this time, but recommending cathflo to tip of catheter until Saturday HD as she likely has some fibrin clots. Patient discharged to make appointment with Dr. Alonso in surgery to discuss PD. Follow up with PCP in 1 week. Patient verbalized  understanding. All questions answered.      Consults:   Consults (From admission, onward)        Status Ordering Provider     Inpatient consult to Nephrology  Once     Provider:  (Not yet assigned)    Completed CECILLE PARTIDA     Inpatient consult to PICC team [facility]  Once     Provider:  (Not yet assigned)    Completed ORLANDO ELIZONDO          * Complication of vascular dialysis catheter  ESRD on HD TTHS  - Previously PD patient; PD catheter removed in 2/2019 after recurrent peritonitis and patient was transitioned to HD  - Bilateral IJ tunneled catheters were attempted without success due to thrombus  - Currently has temporary catheter in L fem (placed 3/28 and re-wired 5/23)  - Last dialyzed Saturday, outpatient dialysis center unable to use L femoral catheter yesterday, 5/28  - Nephrology consulted; plan to administer Cathflo to hemodialysis catheter to dwell for a minimum of 4 hours and dialysis tonight to assess flows  - Per nephrology: if adequate flows and able to tolerate dialysis, recommend discharge tomorrow so patient can make her Gen Surg appointment with Dr. Alonso (5/30). IF catheter does not work properly after cathflo, please make patient NPO after midnight and we will have Dr. Weeks ([facility]) evaluate for re-wire. Either way, would like patient to be discharged so she can make her appointment with Dr. Alonso, as PD will be her best option for long term dialysis given her extensive vascular history.  - NPO at [place] as precaution  - patient able to receive HD, but flow sluggish and had to be de-clotted. Dr. Weeks evaluated patient, recommend instilling cathflo to tip of catheter until HD on Saturday as patient likely has some fibrin clots. No need for re-wiring at this time. Will likely need to increase treatment times at outside dialysis to achieve adequecy, will defer to OP nephrologist  -patient discharged to make Dr. Alonso appointment  - PCP follow up in 1 week    Chronic combined  systolic and diastolic heart failure  CAD  HLD  - CT surgery/CABG eval: not a candidate due to comorbidities  - 2d echo from 4/2019 shows EF of 25%, DD, WMA  - Euvolemic on exam  - Denies CP/SOB  - Continue HD for volume removal  - Continue ASA, statin, and plavix daily  - Strict I/Os, daily weights, fluid restriction    ESRD (end stage renal disease) on dialysis  Anemia in ESRD  - ESRD on HD TTHS  - Nephrology consulted; see above  - H/H above baseline  - Electrolytes stable  - Renal, DM diet  - Continue home regimen: sensipar 90 mg daily, calcitriol 0.25 mcg daily, renvela 800 mg TIDWM  - Strict I/Os, daily weights  - RFP daily    Type 2 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 and hypertension  Neuropathy  -  on admit  - A1c pending  - Renal, DM diet  - Low dose SSI  - Monitor BGs and adjust insulin PRN  - Continue home gabapentin    CAD (coronary artery disease)  HLD        Final Active Diagnoses:    Diagnosis Date Noted POA    PRINCIPAL PROBLEM:  Complication of vascular dialysis catheter [T82.9XXA] 05/29/2019 Yes    Chronic combined systolic and diastolic heart failure [I50.42] 03/18/2019 Yes    ESRD (end stage renal disease) on dialysis [N18.6, Z99.2] 09/12/2018 Not Applicable    Anemia in chronic kidney disease, on chronic dialysis [N18.6, D63.1, Z99.2]  Not Applicable    Hyperlipidemia [E78.5]  Yes    CAD (coronary artery disease) [I25.10]  Yes    Type 2 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 and hypertension [E11.22, I12.0, N18.5]  Yes    Neuropathy [G62.9]  Yes      Problems Resolved During this Admission:       Discharged Condition: good    Disposition: Home or Self Care    Follow Up:  Follow-up Information     Michael Tan Iii, MD In 1 week.    Specialty:  Family Medicine  Contact information:  [address]  [place] [address]  [place] [address] [phone]                 Patient Instructions:      Diet renal     Diet diabetic     Notify your health care provider  if you experience any of the following:  severe uncontrolled pain     Notify your health care provider if you experience any of the following:  difficulty breathing or increased cough     Notify your health care provider if you experience any of the following:  persistent dizziness, light-headedness, or visual disturbances     Activity as tolerated       Significant Diagnostic Studies: Labs:   CMP   Recent Labs   Lab 05/29/19  1517 05/30/19  0558    140   K 5.2* 4.6    103   CO2 27 26   * 129*   BUN 33* 19   CREATININE 8.3* 5.4*   CALCIUM 8.7 8.6*   PROT 8.8*  --    ALBUMIN 2.7* 2.3*   BILITOT 0.2  --    ALKPHOS 143*  --    AST 25  --    ALT 15  --    ANIONGAP 16 11   ESTGFRAFRICA 5.9* 10.0*   EGFRNONAA 5.1* 8.6*    and CBC   Recent Labs   Lab 05/29/19  1517  05/30/19  0558   WBC 13.38*  --  11.14   HGB 8.0*  --  7.9*   HCT 27.1*   < > 27.6*   *  --  314    < > = values in this interval not displayed.       Pending Diagnostic Studies:     None         Medications:  Reconciled Home Medications:      Medication List      CONTINUE taking these medications    acetaminophen 325 MG tablet  Commonly known as:  TYLENOL  Take 650 mg by mouth every 6 (six) hours as needed for Pain.     aspirin 81 MG EC tablet  Commonly known as:  ECOTRIN  Take 1 tablet (81 mg total) by mouth once daily.     atorvastatin 40 MG tablet  Commonly known as:  LIPITOR  Take 40 mg by mouth every evening.     CALCI-CHEW 500 mg calcium (1,250 mg) chewable tablet  Generic drug:  calcium carbonate  Take 500 mg by mouth.     calcitRIOL 0.25 MCG Cap  Commonly known as:  ROCALTROL  Take 0.5 mcg by mouth once daily.     cetirizine 5 MG tablet  Commonly known as:  ZYRTEC  Take 1 tablet (5 mg total) by mouth once daily.     cinacalcet 90 MG Tab  Commonly known as:  SENSIPAR  Take 90 mg by mouth once daily.     ciprofloxacin HCl 500 MG tablet  Commonly known as:  CIPRO  Take 1 tablet (500 mg total) by mouth once daily. for 10 days    "  clopidogrel 75 mg tablet  Commonly known as:  PLAVIX  Take 1 tablet (75 mg total) by mouth once daily.     ferrous sulfate 325 (65 FE) MG EC tablet  Take 1 tablet (325 mg total) by mouth 2 (two) times daily.     gabapentin 300 MG capsule  Commonly known as:  NEURONTIN  Take 1 capsule (300 mg total) by mouth 2 (two) times daily.     insulin aspart U-100 100 unit/mL (3 mL) Inpn pen  Commonly known as:  NovoLOG  Inject 0-5 Units into the skin before meals and at bedtime as needed (Hyperglycemia).     magnesium oxide 400 mg (241.3 mg magnesium) tablet  Commonly known as:  MAG-OX  Take 1 tablet (400 mg total) by mouth 3 (three) times daily.     midodrine 10 MG tablet  Commonly known as:  PROAMATINE  Take 1 tablet (10 mg total) by mouth 3 (three) times daily with meals.     NEPHROCAPS 1 mg Cap  Generic drug:  vitamin renal formula (B-complex-vitamin c-folic acid)  Take 1 capsule by mouth once daily. 1 Capsule Oral Every day     ONETOUCH VERIO Strp  Generic drug:  blood sugar diagnostic  USE 1 STRIP ONCE DAILY IN VITRO     pantoprazole 40 MG tablet  Commonly known as:  PROTONIX  Take 1 tablet (40 mg total) by mouth once daily.     pen needle, diabetic 31 gauge x 3/16" Ndle  1 each by Misc.(Non-Drug; Combo Route) route 4 (four) times daily before meals and nightly.     senna-docusate 8.6-50 mg 8.6-50 mg per tablet  Commonly known as:  PERICOLACE  Take 1 tablet by mouth 2 (two) times daily as needed for Constipation.     sevelamer carbonate 800 mg Tab  Commonly known as:  RENVELA  Take 1 tablet (800 mg total) by mouth 3 (three) times daily with meals.        STOP taking these medications    dextromethorphan-guaifenesin  mg  mg per 12 hr tablet  Commonly known as:  MUCINEX DM            Indwelling Lines/Drains at time of discharge:   Lines/Drains/Airways     Central Venous Catheter Line                 Hemodialysis Catheter 05/24/19 1111 left femoral 6 days          Drain                 Hemodialysis AV Fistula " Left upper arm -- days         Hemodialysis AV Graft Right upper arm -- days                Time spent on the discharge of patient: 36 minutes  Patient was seen and examined on the date of discharge and determined to be suitable for discharge.         Katlyn Montoya PA-C  Department of Hospital Medicine  [facility]

## 2019-06-08 PROBLEM — Z99.2 DIALYSIS PATIENT: Status: ACTIVE | Noted: 2019-01-01

## 2019-06-08 NOTE — PROGRESS NOTES
Ms. Jenni Todd is a 47 yo AAF well known to our service.  She is an ESRD patient with multiple failed vascular access who presents to the ED on 5/29 for evaluation of malfunctioning hemodialysis catheter (which she has had previous admission for same reason). Was send directly for her dialysis unit since was unable to aspirate.     Vitals:    06/08/19 1358   BP: 116/77   Pulse: 95   Resp: 16   Temp: 98.4 °F (36.9 °C)     BMP  Lab Results   Component Value Date     06/08/2019    K 5.2 (H) 06/08/2019     06/08/2019    CO2 24 06/08/2019    BUN 34 (H) 06/08/2019    CREATININE 7.1 (H) 06/08/2019    CALCIUM 8.9 06/08/2019    ANIONGAP 15 06/08/2019    ESTGFRAFRICA 7.1 (A) 06/08/2019    EGFRNONAA 6.2 (A) 06/08/2019           Plan:  ESRD on iHD TTS  Last treatment on Saturday, unable to be dialyzed secondary to left femoral tunneled catheter dysfunction         - Will have placement of alteplase at HD tunneled cath        - After placement will try HD to evaluate cath.       - Full consult will follow     [place]  Nephrology  Fellow  [facility] [address] [facility]    Pager [phone]

## 2019-06-08 NOTE — PROGRESS NOTES
Dialysis note:   Able to aspirate clots from both ports of left femoral permcath. Arterial port easier to aspirate than venous. Both flush easily. TPA instilled. Catheter clamped, capped and taped. Catheter labeled.

## 2019-06-08 NOTE — ED PROVIDER NOTES
Encounter Date: 6/8/2019    SCRIBE #1 NOTE: I, Regina Olivares , am scribing for, and in the presence of,  Dr. Veras . I have scribed the entire note.       History     Chief Complaint   Patient presents with    Vascular Access Problem     Clotted dialysis access. Last dialyzed Thursday.      Ms. Jenni Todd is a 48 y.o. female with a past medical history of ESRD, HTN, and hyperlipidemia presents with a complaint of clotting to her dialysis catheter. Pt states that she went to dialysis today but it was unable to be completed. She was given cath-flow to two of her lines, but only one of them was aspirated. Pt states that every 3-4 treatments with a Perma-cath she has to get cath-flow. Pt explains that she was here a week and a half ago for the same problem. Pt states that she just clots a lot but denies any use of anti-coagulants. She also reports that she ran out of her Plavix 2 weeks ago. Pt denies any other complaints at this time.     The history is provided by the patient and medical records.     Review of patient's allergies indicates:   Allergen Reactions    Clindamycin Diarrhea    Flagyl [metronidazole hcl]     Metronidazole      Past Medical History:   Diagnosis Date    Abnormal finding on Pap smear, HPV DNA positive 2014    Anemia associated with chronic renal failure     on Epogen    Blood type B+     Bulging discs - symptomatic      CAD (coronary artery disease)     Cardiomyopathy 3/13/2019    Diabetes mellitus, type 2     ESRD (end stage renal disease) 04/20/2004    FSGS (focal segmental glomerulosclerosis)     with collapsing    Hyperlipidemia     Hypertension 2004    Neuropathy     NSTEMI (non-ST elevated myocardial infarction) 3/29/2019    Obesity     Secondary hyperparathyroidism, renal     TIA (transient ischemic attack)     Uterine fibroid     small uterine      Past Surgical History:   Procedure Laterality Date    ANGIOGRAM, CORONARY ARTERY N/A 4/15/2019    Performed by  Roland Napier MD at [facility] CATH LAB    CARDIAC CATHETERIZATION      PCI x 2     SECTION, CLASSIC      COLONOSCOPY N/A 2018    Performed by Rodrigue Russell MD at [facility] ENDO (2ND FLR)    DEBRIDEMENT-WOUND Left 2016    Performed by Treessa Maddox MD at [facility] OR    DIALYSIS FISTULA CREATION      multiple fistulas and grafts before PD     EGD (ESOPHAGOGASTRODUODENOSCOPY) N/A 3/14/2019    Performed by Adolph Davila MD at [facility] ENDO    EGD (ESOPHAGOGASTRODUODENOSCOPY) N/A 3/13/2019    Performed by Adolph Davila MD at [facility] ENDO    INCISION AND DRAINAGE (I&D), LABIAL N/A 2016    Performed by Harmony Fernandez MD at [facility] OR    INCISION AND DRAINAGE (I&D), LABIAL (ADD ON) Left 2016    Performed by Teressa Maddox MD at [facility] OR    INCISION AND DRAINAGE OF WOUND Left     VULVAR ABCESS WITH NECROSIS    Insertion, Catheter, Central Venous, Hemodialysis N/A 3/28/2019    Performed by Kimo Weeks MD at [facility] CATH LAB    Insertion, Catheter, Central Venous, Hemodialysis N/A 3/18/2019    Performed by Kimo Weeks MD at [facility] CATH LAB    INSERTION, CATHETER, TUNNELED ABORTED Left 3/14/2019    Performed by Servando Solis MD at [facility] OR    INSERTION, GRAFT, ARTERIOVENOUS, RIGHT ARM Right 3/22/2019    Performed by BESSIE Wynn III, MD at [facility] OR 2ND FLR    INSERTION, INTRA-AORTIC BALLOON PUMP  4/15/2019    Performed by Roland Napier MD at [facility] CATH LAB    Left heart cath Left 4/15/2019    Performed by Roland Napier MD at [facility] CATH LAB    ORIF, HIP Left 2018    Performed by Tevin Grullon MD at [facility] OR    PERITONEAL CATHETER INSERTION      PERMCATH REWIRE- TUNNELED CATH REWIRE Left 2017    Performed by Baldev Munroe MD at [facility] CATH LAB    PERMCATH REWIRE- TUNNELED CATH REWIRE N/A 10/5/2017    Performed by Baldev Munroe MD at [facility] CATH LAB    REMOVAL, CATHETER, DIALYSIS, PERITONEAL N/A 3/14/2019    Performed by Servando FERRARI  [facility] at [facility] OR    REPLACEMENT, CATHETER, DIALYSIS, OVER GUIDEWIRE, USING EXISTING VENOUS ACCESS Left 5/24/2019    Performed by Baldev Munroe MD at [facility] CATH LAB    UMBILICAL HERNIA REPAIR      Venogram, possible intervention Right 3/20/2019    Performed by BESSIE Wynn III, MD at [facility] CATH LAB     Family History   Problem Relation Age of Onset    Cancer Maternal Grandmother     Cancer Paternal Grandfather     Diabetes Maternal Aunt     Diabetes Paternal Aunt     Kidney disease Neg Hx     Heart disease Neg Hx     Ovarian cancer Neg Hx     Breast cancer Neg Hx      Social History     Tobacco Use    Smoking status: Never Smoker    Smokeless tobacco: Never Used   Substance Use Topics    Alcohol use: No     Comment: Pt reports some social use of about 1-2 drinks about every six months.    Drug use: No     Review of Systems   Constitutional: Negative for fever.   HENT: Negative for congestion.    Eyes: Negative for visual disturbance.   Respiratory: Negative for apnea.    Cardiovascular: Negative for chest pain.   Gastrointestinal: Negative for abdominal pain.   Genitourinary: Negative for frequency.        Pt receives dialysis 3 days a week.    Musculoskeletal: Negative for arthralgias.   Skin: Negative for color change.   Neurological: Negative for headaches.       Physical Exam     Initial Vitals [06/08/19 1358]   BP Pulse Resp Temp SpO2   116/77 95 16 98.4 °F (36.9 °C) 100 %      MAP       --         Physical Exam    Nursing note and vitals reviewed.  Constitutional: She appears well-developed and well-nourished. She is not diaphoretic. No distress.   HENT:   Head: Normocephalic and atraumatic.   Left Ear: External ear normal.   Eyes: Conjunctivae and EOM are normal. Pupils are equal, round, and reactive to light.   Neck: Normal range of motion. Neck supple. No JVD present.   Cardiovascular: Normal rate, regular rhythm and normal heart sounds.   No murmur heard.  Pulmonary/Chest: Breath  sounds normal. No respiratory distress. She has no wheezes. She has no rhonchi. She has no rales.   Abdominal: Soft. Bowel sounds are normal. She exhibits no distension and no mass. There is no tenderness. There is no rebound and no guarding.   Musculoskeletal: Normal range of motion. She exhibits no edema or tenderness.   Neurological: She is alert and oriented to person, place, and time. She has normal strength. No cranial nerve deficit or sensory deficit.   Skin: Skin is warm and dry. No rash noted.   Access to left femoral vein.          ED Course   Procedures  Labs Reviewed   BASIC METABOLIC PANEL - Abnormal; Notable for the following components:       Result Value    Potassium 5.2 (*)     Glucose 133 (*)     BUN, Bld 34 (*)     Creatinine 7.1 (*)     eGFR if  7.1 (*)     eGFR if non  6.2 (*)     All other components within normal limits     EKG Readings: (Independently Interpreted)   Normal sinus rhythm at 85 bpm. No sign of hyperkalemia.      ECG Results          EKG 12-lead (Final result)  Result time 06/08/19 20:26:47    Final result by Interface, Lab In [facility] (06/08/19 20:26:47)                 Narrative:    Test Reason : Z99.2,    Vent. Rate : 084 BPM     Atrial Rate : 084 BPM     P-R Int : 186 ms          QRS Dur : 084 ms      QT Int : 398 ms       P-R-T Axes : 056 076 250 degrees     QTc Int : 470 ms    Normal sinus rhythm  Low anterior forces  Abnormal ECG  When compared with ECG of 29-MAY-2019 14:49,  No significant change was found  Confirmed by Tc MARTINES MD ([id number]) on 6/8/2019 8:26:40 PM    Referred By: JERI   SELF           Confirmed By:Tc MARTINES MD                            Imaging Results    None          Medical Decision Making:   History:   Old Medical Records: I decided to obtain old medical records.  Initial Assessment:   I reviewed her medical record and discussed with Nephrology. They will dialyze. I called the dialysis nurse. They will administer  Alteplase. Recommend admission to medicine for dialysis.   Independently Interpreted Test(s):   I have ordered and independently interpreted EKG Reading(s) - see prior notes  Clinical Tests:   Lab Tests: Ordered and Reviewed  Medical Tests: Ordered and Reviewed  ED Management:  4:40 PM   Discussed with Medicine.     5:20 PM   Potassium slightly elevated at 5.2.   Other:   I have discussed this case with another health care provider.       <> Summary of the Discussion: Nephrology.             Scribe Attestation:   Scribe #1: I performed the above scribed service and the documentation accurately describes the services I performed. I attest to the accuracy of the note.               Clinical Impression:       ICD-10-CM ICD-9-CM   1. Problem with dialysis access, subsequent encounter T82.898D V58.89     996.73   2. Dialysis patient Z99.2 V45.11   3. Complication of vascular access for dialysis T82.9XXA 996.73   4. Other ascites R18.8 789.59         Disposition:   Disposition: Admitted  Condition: Stable                        Taz Veras MD  06/09/19 9075

## 2019-06-08 NOTE — ED TRIAGE NOTES
Unable to dialyze this am  B/c left groin access is clotted - here to have it declotted per  Dialysis RN.  Coming from [facility]  Dialysis on [facility] Legs swollen and hard  And abdomen distended.

## 2019-06-08 NOTE — ED NOTES
LOC: The patient is awake and alert; oriented x 3 and speaking appropriately.  APPEARANCE: Patient resting comfortably, patient is clean and well groomed  SKIN: warm and dry, normal skin turgor & moist mucus membranes, skin intact, no breakdown noted.  MUSCULOSKELETAL: Patient moving all extremities well, no obvious swelling or deformities noted  RESPIRATORY: Airway is open and patent, breath sounds clear throughout all lung fields; respirations are spontaneous, normal effort and rate  CARDIAC: Patient has a normal rate, no peripheral edema noted, capillary refill < 3 seconds; No complaints of chest pain , dialysis access left groin (Tues/Thrs/Saturday) Old non-functioning accesses in both upper arms  ABDOMEN: firm  and non tender to palpation,  distention noted. Bowel sounds present x 4, dressings over 3 old peritoneal dialysis access sites

## 2019-06-09 PROBLEM — R18.8 ASCITES: Status: ACTIVE | Noted: 2019-01-01

## 2019-06-09 NOTE — H&P
"[facility] Medicine  History & Physical    Patient Name: Jenni Todd  MRN: [id number]  Admission Date: 6/8/2019  Attending Physician: ZENY Larry MD   Primary Care Provider: Michael Tan Iii, MD    [facility]: [facility]OSP MED E BESSIE Larry MD     Patient information was obtained from patient, past medical records and ER records.     Subjective:     Principal Problem:Complication of vascular access for dialysis    Chief Complaint:   Chief Complaint   Patient presents with    Vascular Access Problem     Clotted dialysis access. Last dialyzed Thursday.         HPI: Ms. Todd is a 49yo lady with a past medical history of ESRD on HD on TThS, anemia, CAD, DCHF and HLD.  She was recently placed into observation status on 5/29-5/30 of last month due to malfunction of her HD catheter.  Prior to initiating HD in February of this year, she had been on PD, stopped due to recurrent episodes of peritonitis.  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  She had to have a tunneled dialysis catheter placed to the left femoral artery on 3/28 with a rewiring on 5/23.  Subsequent HD on 3/28 failed leading to her admission on 5/29.    As MAICO Montoya noted on that stay, "Cathflo instilled for four hours, HD given but flow sluggish and had to be de-clotted. Dr. Weeks evaluated patient, no re-wiring at this time, but recommending cathflo to tip of catheter until Saturday HD as she likely has some fibrin clots. Patient discharged to make appointment with Dr. Alonso in surgery to discuss PD. Follow up with PCP in 1 week. Patient verbalized understanding. All questions answered."    She now presents to our ED from [facility] [place] on [address] due to left femoral tunnel catheter dysfunction.  Her last successful HD was last Thursday.  At [facility] today, she was unable to get flow from the " left HD femoral catheter.  Unfortunately they only had one vial of Cath-fanny in the dialysis clinic and it did not work, so she was referred to our ED.  In the ED the dialysis nurse instilled TPA into the catheter and was able to improve the flow some.      Past Medical History:   Diagnosis Date    Abnormal finding on Pap smear, HPV DNA positive     Anemia associated with chronic renal failure     on Epogen    Blood type B+     Bulging discs - symptomatic      CAD (coronary artery disease)     Cardiomyopathy 3/13/2019    Diabetes mellitus, type 2     ESRD (end stage renal disease) 2004    FSGS (focal segmental glomerulosclerosis)     with collapsing    Hyperlipidemia     Hypertension     Neuropathy     NSTEMI (non-ST elevated myocardial infarction) 3/29/2019    Obesity     Secondary hyperparathyroidism, renal     TIA (transient ischemic attack)     Uterine fibroid     small uterine        Past Surgical History:   Procedure Laterality Date    ANGIOGRAM, CORONARY ARTERY N/A 4/15/2019    Performed by Roland [facility]ier [facility] at [facility] CATH LAB    CARDIAC CATHETERIZATION      PCI x 2     SECTION, CLASSIC      COLONOSCOPY N/A 2018    Performed by Rodrigue Russell MD at [facility] ENDO (2ND FLR)    DEBRIDEMENT-WOUND Left 2016    Performed by [facility]ressa Maddox MD at [facility] OR    DIALYSIS FISTULA CREATION      multiple fistulas and grafts before PD     EGD (ESOPHAGOGASTRODUODENOSCOPY) N/A 3/14/2019    Performed by Adolph Davila MD at [facility] ENDO    EGD (ESOPHAGOGASTRODUODENOSCOPY) N/A 3/13/2019    Performed by Adolph Davila MD at [facility] ENDO    INCISION AND DRAINAGE (I&D), LABIAL N/A 2016    Performed by Harmony Fernandez MD at [facility] OR    INCISION AND DRAINAGE (I&D), LABIAL (ADD ON) Left 2016    Performed by Teressa Maddox MD at [facility] OR    INCISION AND DRAINAGE OF WOUND Left     VULVAR ABCESS WITH NECROSIS    Insertion, Catheter, Central Venous,  Hemodialysis N/A 3/28/2019    [facility] by [facility] CATH LAB    Insertion, Catheter, Central Venous, Hemodialysis N/A 3/18/2019    Performed by [facility] Weeks [facility] at [facility] CATH LAB    INSERTION, CATHETER, TUNNELED ABORTED Left 3/14/2019    Performed by Servando Solis MD at [facility] OR    INSERTION, GRAFT, ARTERIOVENOUS, RIGHT ARM Right 3/22/2019    Performed by BESSIE Wynn III, [facility] at [facility] OR 2ND FLR    INSERTION, INTRA-AORTIC BALLOON PUMP  4/15/2019    Performed by Roland Napier MD at [facility] CATH LAB    Left heart cath Left 4/15/2019    Performed by Roland Napier MD at [facility] CATH LAB    ORIF, HIP Left 9/13/2018    Performed by Tevin Grullon MD at [facility] OR    PERITONEAL CATHETER INSERTION      PERMCATH REWIRE- TUNNELED CATH REWIRE Left 11/13/2017    Performed by Baldev Munroe MD at [facility] CATH LAB    PERMCATH REWIRE- TUNNELED CATH REWIRE N/A 10/5/2017    Performed by Baldev Munroe MD at [facility] CATH LAB    REMOVAL, CATHETER, DIALYSIS, PERITONEAL N/A 3/14/2019    Performed by Servando Solis MD at [facility] OR    REPLACEMENT, CATHETER, DIALYSIS, OVER GUIDEWIRE, USING EXISTING VENOUS ACCESS Left 5/24/2019    Performed by Baldev Munroe MD at [facility] CATH LAB    UMBILICAL HERNIA REPAIR      Venogram, possible intervention Right 3/20/2019    Performed by BESSIE Wynn III, MD at [facility] CATH LAB       Review of patient's allergies indicates:   Allergen Reactions    Clindamycin Diarrhea    Flagyl [metronidazole hcl]     Metronidazole     Pepcid [famotidine]        No current facility-administered medications on file prior to encounter.      Current Outpatient Medications on File Prior to Encounter   Medication Sig    acetaminophen (TYLENOL) 325 MG tablet Take 650 mg by mouth every 6 (six) hours as needed for Pain.    aspirin (ECOTRIN) 81 MG EC tablet Take 1 tablet (81 mg total) by mouth once daily.    calcitRIOL (ROCALTROL) 0.25 MCG Cap Take 0.5 mcg by mouth once  "daily.     cinacalcet (SENSIPAR) 90 MG Tab Take 90 mg by mouth once daily.    clopidogrel (PLAVIX) 75 mg tablet Take 1 tablet (75 mg total) by mouth once daily.    ferrous sulfate 325 (65 FE) MG EC tablet Take 1 tablet (325 mg total) by mouth 2 (two) times daily.    gabapentin (NEURONTIN) 300 MG capsule Take 1 capsule (300 mg total) by mouth 2 (two) times daily.    insulin aspart U-100 (NOVOLOG) 100 unit/mL (3 mL) InPn pen Inject 0-5 Units into the skin before meals and at bedtime as needed (Hyperglycemia).    magnesium oxide (MAG-OX) 400 mg (241.3 mg magnesium) tablet Take 1 tablet (400 mg total) by mouth 3 (three) times daily. (Patient taking differently: Take 400 mg by mouth once daily. )    midodrine (PROAMATINE) 10 MG tablet Take 1 tablet (10 mg total) by mouth 3 (three) times daily with meals.    ONETOUCH VERIO Strp USE 1 STRIP ONCE DAILY IN VITRO    pen needle, diabetic 31 gauge x 3/16" Ndle 1 each by Misc.(Non-Drug; Combo Route) route 4 (four) times daily before meals and nightly.    sevelamer carbonate (RENVELA) 800 mg Tab Take 1 tablet (800 mg total) by mouth 3 (three) times daily with meals.    vitamin renal formula, B-complex-vitamin c-folic acid, (B COMPLEX-C-FOLIC ACID) 1 mg Cap Take 1 capsule by mouth once daily. 1 Capsule Oral Every day    atorvastatin (LIPITOR) 40 MG tablet Take 40 mg by mouth every evening.     calcium carbonate (CALCI-CHEW) 500 mg calcium (1,250 mg) chewable tablet Take 500 mg by mouth.    cetirizine (ZYRTEC) 5 MG tablet Take 1 tablet (5 mg total) by mouth once daily.    pantoprazole (PROTONIX) 40 MG tablet Take 1 tablet (40 mg total) by mouth once daily.    senna-docusate 8.6-50 mg (PERICOLACE) 8.6-50 mg per tablet Take 1 tablet by mouth 2 (two) times daily as needed for Constipation.     Family History     Problem Relation (Age of Onset)    Cancer Maternal Grandmother, Paternal Grandfather    Diabetes Maternal Aunt, Paternal Aunt        Tobacco Use    Smoking " status: Never Smoker    Smokeless tobacco: Never Used   Substance and Sexual Activity    Alcohol use: No     Comment: Pt reports some social use of about 1-2 drinks about every six months.    Drug use: No    Sexual activity: Not Currently     Partners: Male     Birth control/protection: None     Review of Systems   Constitutional: Positive for fatigue. Negative for chills and fever.   HENT: Positive for congestion.    Eyes: Negative for itching.   Respiratory: Positive for shortness of breath. Negative for cough.    Cardiovascular: Positive for leg swelling. Negative for chest pain.   Gastrointestinal: Negative for abdominal pain, constipation, diarrhea, nausea and vomiting.   Endocrine: Negative for cold intolerance.   Genitourinary: Negative for flank pain.   Musculoskeletal: Negative for arthralgias.   Skin: Positive for wound.   Allergic/Immunologic: Negative for immunocompromised state.   Neurological: Negative for dizziness and weakness.   Hematological: Does not bruise/bleed easily.   Psychiatric/Behavioral: Negative for confusion. The patient is not nervous/anxious.      Objective:     Vital Signs (Most Recent):  Temp: 98.4 °F (36.9 °C) (06/08/19 1358)  Pulse: 87 (06/08/19 1732)  Resp: 18 (06/08/19 1732)  BP: 122/80 (06/08/19 1732)  SpO2: 95 % (06/08/19 1732) Vital Signs (24h Range):  Temp:  [98.4 °F (36.9 °C)] 98.4 °F (36.9 °C)  Pulse:  [80-95] 87  Resp:  [16-24] 18  SpO2:  [95 %-100 %] 95 %  BP: (116-137)/(77-92) 122/80     Weight: 44.6 kg (98 lb 5 oz)  Body mass index is 15.4 kg/m².    Physical Exam   Constitutional: She is oriented to person, place, and time. She appears well-developed and well-nourished.  Non-toxic appearance. She does not have a sickly appearance. She does not appear ill. No distress.   HENT:   Head: Normocephalic and atraumatic.   Mouth/Throat: No oropharyngeal exudate.   Eyes: Pupils are equal, round, and reactive to light. Conjunctivae and EOM are normal. Right eye exhibits no  [General Appearance - In No Acute Distress] : in no acute distress discharge. Left eye exhibits no discharge. No scleral icterus.   Neck: Normal range of motion. Neck supple. No JVD present. No tracheal deviation present. No thyromegaly present.   Cardiovascular: Normal rate, regular rhythm and intact distal pulses. Exam reveals no gallop and no friction rub.   Murmur heard.   Systolic murmur is present with a grade of 2/6.  Left Femoral HD tunnel cath present.  No drainage or redness.   Pulmonary/Chest: Effort normal. No stridor. No respiratory distress. She has decreased breath sounds in the right lower field and the left lower field. She has no wheezes. She has no rhonchi. She has rales in the right lower field and the left lower field. She exhibits no tenderness.   Abdominal: Soft. Bowel sounds are normal. She exhibits distension and fluid wave. She exhibits no mass. There is no tenderness. There is no rebound and no guarding.   Firm distended abdomen with ascites  Two healed wounds to abdomen from old PD cath.   Genitourinary:   Genitourinary Comments: No terrell in place   Musculoskeletal: Normal range of motion. She exhibits no edema or tenderness.   Lymphadenopathy:     She has no cervical adenopathy.   1-2+ Edema to legs   Neurological: She is alert and oriented to person, place, and time. She displays normal reflexes. No cranial nerve deficit. She exhibits normal muscle tone. Coordination normal. GCS eye subscore is 4. GCS verbal subscore is 5. GCS motor subscore is 6.   Skin: Skin is warm and dry. No rash noted. She is not diaphoretic. No erythema. No pallor.   Psychiatric: She has a normal mood and affect. Her speech is normal and behavior is normal. Judgment and thought content normal. Cognition and memory are normal.   Nursing note and vitals reviewed.        CRANIAL NERVES     CN III, IV, VI   Pupils are equal, round, and reactive to light.  Extraocular motions are normal.       Significant Labs:   Recent Results (from the past 24 hour(s))   Basic metabolic panel     Collection Time: 06/08/19  4:29 PM   Result Value Ref Range    Sodium 141 136 - 145 mmol/L    Potassium 5.2 (H) 3.5 - 5.1 mmol/L    Chloride 102 95 - 110 mmol/L    CO2 24 23 - 29 mmol/L    Glucose 133 (H) 70 - 110 mg/dL    BUN, Bld 34 (H) 6 - 20 mg/dL    Creatinine 7.1 (H) 0.5 - 1.4 mg/dL    Calcium 8.9 8.7 - 10.5 mg/dL    Anion Gap 15 8 - 16 mmol/L    eGFR if African American 7.1 (A) >60 mL/min/1.73 m^2    eGFR if non  6.2 (A) >60 mL/min/1.73 m^2       Significant Imaging:   No radiographs performed in the ED    CT ABDOMEN WITHOUT CONTRAST    CLINICAL HISTORY:  Abd swelling, ascites suspected;Other ascites    TECHNIQUE:  Low dose axial images, sagittal and coronal reformations were obtained from the lung bases to the iliac crests without the use of intravenous or oral contrast.    COMPARISON:  Chest radiograph 05/13/2019, abdominal ultrasound 04/13/2019, CT abdomen pelvis 03/17/2019    FINDINGS:  ABDOMEN:    - Heart: Normal size without pericardial effusion.  Significant calcific atherosclerosis involving the coronary arteries in a multivessel distribution.  Abnormal calcification involving the aortic annulus.    - Lung bases: No significant pleural effusion or pneumothorax.  Scattered bandlike opacities in the lung bases likely representing subsegmental atelectasis, overall improved from prior CT.    - Liver: Stable hepatomegaly.  No mass identified on this noncontrast examination.    - Gallbladder: No calcified gallstones.    - Bile Ducts: No evidence of intra or extra hepatic biliary ductal dilation.    - Spleen: Negative.    - Kidneys: Atrophic native kidneys, similar to prior.  Subcentimeter hypodensities likely representing renal cysts.  No hydronephrosis.    - Adrenals: Unremarkable.    - Pancreas: No mass or peripancreatic fat stranding.    - Retroperitoneum:  Redemonstration of normal/upper normal periaortic lymph nodes.  No acute abnormalities.    - Vascular: Moderate calcific  [] : no respiratory distress [Respiration, Rhythm And Depth] : normal respiratory rhythm and effort atherosclerosis of the abdominal aorta with extension into the mesenteric and proximal iliac vasculature.  No abdominal aortic aneurysm.  Incidental note made of left femoral central venous catheter.    - Abdominal wall:  Mild generalized anasarca.  No acute abnormality.    - Bowel/mesentery: Moderate volume of abdominal ascites.  Extensive peritoneal calcifications    - Bones: Mild degenerative changes without acute fracture or aggressive osseous lesion.      Impression       Moderate volume of abdominal ascites.    Atrophic kidneys containing subcentimeter hypodensities likely representing renal cysts.    Scattered subsegmental atelectasis in bilateral lung bases.  Interval resolution of trace pleural effusions seen on prior CT.    Redemonstration of extensive vascular and peritoneal calcifications.    Additional, stable findings as above.    Electronically signed by resident: Rafiq Michael  Date: 06/03/2019  Time: 13:08    Electronically signed by: Heraclio Timmons MD  Date: 06/03/2019       Assessment/Plan:     Complication of vascular access for dialysis  -This was corrected with 3 of cath flow (1.5 to each lead) in the ED  -Some of the issue at [facility] may be positional   -Sitting up may kink the line   -Out-pt [facility] has no beds, only chairs    ESRD on hemodialysis  -sevelamer carbonate tablet 800 mg, 800 mg, Oral, TID WM  -calcitRIOL capsule 0.5 mcg, 0.5 mcg, Oral, Daily   -cinacalcet tablet 90 mg, 90 mg, Oral, Daily  -Planning HD tonight or early tomorrow morning    Distended, firm abdomen with moderate ascites  -See CT abd above  -Surgeon is following this  -Consider tap vs extra fluid off with HD  -Discuss with Nephro in am to see best option    Diabetes mellitus II, uncontrolled, neuropathy  Autonomic neuropathy and orthostatic hypotension  -midodrine tablet 10 mg, 10 mg, Oral, TID WM  -gabapentin capsule 300 mg, 300 mg, Oral, BID    Renovascular hypertension  Secondary pulmonary hypertension  · The estimated  PA systolic pressure is 32 mm Hg  · Moderate to moderate-severe (2-3+) mitral regurgitation  · Left ventricular diastolic dysfunction.    Coronary artery disease, native heart without angina  -S/P 3V CABG  -aspirin EC tablet 81 mg, 81 mg, Oral, Daily  -clopidogrel tablet 75 mg, 75 mg, Oral, Daily    Acute on chronic combined systolic and diastolic heart failure  -Exacerbated by volume overload from incomplete HD's  · Severely decreased left ventricular systolic function  · The estimated ejection fraction is 25%    Anemia in ESRD  -vitamin renal formula (B-complex-vitamin c-folic acid) 1 mg per capsule 1 capsule, 1 capsule, Oral, Daily  -ferrous sulfate EC tablet 325 mg, 325 mg, Oral, BID  -Epo as per Nephrology if needed in future    VTE Risk Mitigation (From admission, onward)        Ordered     heparin (porcine) injection 5,000 Units  Every 8 hours      06/08/19 1939     Place MEERA hose  Until discontinued      06/08/19 1939     Place sequential compression device  Until discontinued      06/08/19 1939     IP VTE HIGH RISK PATIENT  Once      06/08/19 1939            BESSIE Larry MD  Department of Hospital Medicine   [facility]   [Auscultation Breath Sounds / Voice Sounds] : lungs were clear to auscultation bilaterally [Heart Rate And Rhythm] : heart rate was normal and rhythm regular [Edema] : there was no peripheral edema [No Focal Deficits] : no focal deficits [Oriented To Time, Place, And Person] : oriented to person, place, and time [Affect] : the affect was normal [Mood] : the mood was normal

## 2019-06-09 NOTE — PLAN OF CARE
Problem: Adult Inpatient Plan of Care  Goal: Plan of Care Review  Outcome: Ongoing (interventions implemented as appropriate)  Reviewed POC with patient, she verbalizes understanding. VSS. No complains of pain. Patient receiving dialysis. No acute events/falls this shift. Call bell within reach. Will continue to monitor.

## 2019-06-09 NOTE — HPI
Jenni Todd is a 47 yo AAF well known to our service.  She is an ESRD patient with multiple failed vascular access who presents to the ED on 5/29 for evaluation of malfunctioning hemodialysis catheter (which she has had previous admission for same reason). Was send directly for her dialysis unit since was unable to aspirate from her Left tunneled cath. Also has been presenting with more distended abdomen since the last 2 months.     Nephrology was consulted for inpatient dialysis treatment.

## 2019-06-09 NOTE — ASSESSMENT & PLAN NOTE
Jenni Todd is a 48 year old woman who presented with with Left femoral tunneled cath malfunction which were unable to aspirate. In ED received tPA that were placed in each port. After easley received HD which was well tolerated, with UF 2 L.     iHD TThSa  At [facility] in [place]   Followed by Dr. Watt   Access Left Femoral HD tunneled cath   EDW?  Duration 4 hrs   No residual renal function   Last HD was on Thursday     Plan:  - Patient received HD yesterday for clearance and volume removal after placement of tPA in cath, with total UF 2 L  - Blood pressures have been stable   - Patient will be send home today in which require  To continue HD treatment at outpatient dialysis treatment

## 2019-06-09 NOTE — SUBJECTIVE & OBJECTIVE
Past Medical History:   Diagnosis Date    Abnormal finding on Pap smear, HPV DNA positive     Anemia associated with chronic renal failure     on Epogen    Blood type B+     Bulging discs - symptomatic      CAD (coronary artery disease)     Cardiomyopathy 3/13/2019    Diabetes mellitus, type 2     ESRD (end stage renal disease) 2004    FSGS (focal segmental glomerulosclerosis)     with collapsing    Hyperlipidemia     Hypertension     Neuropathy     NSTEMI (non-ST elevated myocardial infarction) 3/29/2019    Obesity     Secondary hyperparathyroidism, renal     TIA (transient ischemic attack)     Uterine fibroid     small uterine        Past Surgical History:   Procedure Laterality Date    ANGIOGRAM, CORONARY ARTERY N/A 4/15/2019    Performed by Roland Napier MD at [facility] CATH LAB    CARDIAC CATHETERIZATION      PCI x 2     SECTION, CLASSIC      COLONOSCOPY N/A 2018    Performed by Rodrigeu Russell MD at [facility] ENDO (2ND FLR)    DEBRIDEMENT-WOUND Left 2016    Performed by Teressa Maddox MD at [facility] [place] OR    DIALYSIS FISTULA CREATION      multiple fistulas and grafts before PD     EGD (ESOPHAGOGASTRODUODENOSCOPY) N/A 3/14/2019    Performed by Adolph Davila MD at [facility] ENDO    EGD (ESOPHAGOGASTRODUODENOSCOPY) N/A 3/13/2019    Performed by Adolph Davila MD at [facility] ENDO    INCISION AND DRAINAGE (I&D), LABIAL N/A 2016    Performed by Harmony Fernandez MD at [facility] [place] OR    INCISION AND DRAINAGE (I&D), LABIAL (ADD ON) Left 2016    Performed by Teressa Maddox MD at [facility] [place] OR    INCISION AND DRAINAGE OF WOUND Left     VULVAR ABCESS WITH NECROSIS    Insertion, Catheter, Central Venous, Hemodialysis N/A 3/28/2019    Performed by Kimo Weeks MD at [facility] CATH LAB    Insertion, Catheter, Central Venous, Hemodialysis N/A 3/18/2019    Performed by Kimo Wekes MD at [facility] CATH LAB    INSERTION, CATHETER, TUNNELED ABORTED Left 3/14/2019     Performed by Servando Solis MD at [facility] OR    INSERTION, GRAFT, ARTERIOVENOUS, RIGHT ARM Right 3/22/2019    Performed by BESSIE Wynn III, MD at [facility] OR 2ND FLR    INSERTION, INTRA-AORTIC BALLOON PUMP  4/15/2019    Performed by Roland Napier MD at [facility] CATH LAB    Left heart cath Left 4/15/2019    Performed by Roland Napier MD at [facility] CATH LAB    ORIF, HIP Left 9/13/2018    Performed by Tevin Grullon MD at [facility] OR    PERITONEAL CATHETER INSERTION      PERMCATH REWIRE- TUNNELED CATH REWIRE Left 11/13/2017    Performed by Baldev Munroe MD at [facility] CATH LAB    PERMCATH REWIRE- TUNNELED CATH REWIRE N/A 10/5/2017    Performed by Baldev Munroe MD at [facility] CATH LAB    REMOVAL, CATHETER, DIALYSIS, PERITONEAL N/A 3/14/2019    Performed by Servando Solis MD at [facility] OR    REPLACEMENT, CATHETER, DIALYSIS, OVER GUIDEWIRE, USING EXISTING VENOUS ACCESS Left 5/24/2019    Performed by Baldev Munroe MD at [facility] CATH LAB    UMBILICAL HERNIA REPAIR      Venogram, possible intervention Right 3/20/2019    Performed by BESSIE Wynn III, MD at [facility] CATH LAB       Review of patient's allergies indicates:   Allergen Reactions    Clindamycin Diarrhea    Flagyl [metronidazole hcl]     Metronidazole      Current Facility-Administered Medications   Medication Frequency    0.9%  NaCl infusion PRN    0.9%  NaCl infusion Once    acetaminophen tablet 500 mg Q8H PRN    aspirin EC tablet 81 mg Daily    calcitRIOL capsule 0.5 mcg Daily    cinacalcet tablet 90 mg Daily    clopidogrel tablet 75 mg Daily    ferrous sulfate EC tablet 325 mg BID    gabapentin capsule 300 mg BID    heparin (porcine) injection 5,000 Units Q8H    magnesium oxide tablet 400 mg Daily    midodrine tablet 10 mg TID WM    ondansetron disintegrating tablet 8 mg Q8H PRN    oxyCODONE immediate release tablet 5 mg Q6H PRN    promethazine tablet 25 mg Q6H PRN    ramelteon tablet 8 mg Nightly PRN    senna-docusate 8.6-50 mg  per tablet 1 tablet BID PRN    sevelamer carbonate tablet 800 mg TID WM    sodium chloride 0.9% flush 10 mL PRN    vitamin renal formula (B-complex-vitamin c-folic acid) 1 mg per capsule 1 capsule Daily     Family History     Problem Relation (Age of Onset)    Cancer Maternal Grandmother, Paternal Grandfather    Diabetes Maternal Aunt, Paternal Aunt        Tobacco Use    Smoking status: Never Smoker    Smokeless tobacco: Never Used   Substance and Sexual Activity    Alcohol use: No     Comment: Pt reports some social use of about 1-2 drinks about every six months.    Drug use: No    Sexual activity: Not Currently     Partners: Male     Birth control/protection: None     Review of Systems   Constitutional: Negative for fatigue.   HENT: Negative for congestion, ear pain and sore throat.    Eyes: Negative for pain.   Respiratory: Negative for cough, chest tightness, shortness of breath and stridor.    Cardiovascular: Negative for chest pain, palpitations and leg swelling.   Gastrointestinal: Positive for abdominal distention. Negative for abdominal pain, diarrhea, nausea and vomiting.   Endocrine: Negative for polydipsia and polyuria.   Genitourinary: Negative for decreased urine volume, difficulty urinating, dysuria, enuresis, hematuria and pelvic pain.   Musculoskeletal: Negative for back pain, joint swelling and neck stiffness.   Skin: Negative for color change, pallor and rash.   Neurological: Negative for syncope, weakness and light-headedness.   Psychiatric/Behavioral: Negative for agitation, confusion and hallucinations.     Objective:     Vital Signs (Most Recent):  Temp: 98.4 °F (36.9 °C) (06/09/19 1143)  Pulse: 92 (06/09/19 1143)  Resp: 18 (06/09/19 1143)  BP: (!) 146/93 (06/09/19 1143)  SpO2: 99 % (06/09/19 1143)  O2 Device (Oxygen Therapy): room air (06/09/19 0800) Vital Signs (24h Range):  Temp:  [96.1 °F (35.6 °C)-98.4 °F (36.9 °C)] 98.4 °F (36.9 °C)  Pulse:  [80-95] 92  Resp:  [16-24] 18  SpO2:   [95 %-100 %] 99 %  BP: ()/(51-93) 146/93     Weight: 99.5 kg (219 lb 5.7 oz) (06/09/19 0530)  Body mass index is 34.36 kg/m².  Body surface area is 2.17 meters squared.    I/O last 3 completed shifts:  In: 250 [Other:250]  Out: 2528 [Other:2528]    Physical Exam   Constitutional: She is oriented to person, place, and time. She appears well-developed. No distress.   HENT:   Head: Normocephalic and atraumatic.   Right Ear: External ear normal.   Left Ear: External ear normal.   Eyes: Conjunctivae and EOM are normal. Right eye exhibits no discharge. Left eye exhibits no discharge. No scleral icterus.   Neck: Normal range of motion. Neck supple.   Cardiovascular: Normal rate and regular rhythm. Exam reveals no gallop and no friction rub.   No murmur heard.  Pulmonary/Chest: Effort normal and breath sounds normal. No respiratory distress. She has no wheezes. She has no rales.   Abdominal: Soft. Bowel sounds are normal. She exhibits distension. There is no tenderness.   Musculoskeletal: Normal range of motion. She exhibits no edema or deformity.   Neurological: She is alert and oriented to person, place, and time.   Skin: Skin is warm and dry. She is not diaphoretic.   L femoral tunneled catheter   Psychiatric: She has a normal mood and affect. Her behavior is normal.       Significant Labs:  ABGs: No results for input(s): PH, PCO2, HCO3, POCSATURATED, BE in the last 168 hours.  BMP:   Recent Labs   Lab 06/09/19  0925   *      CO2 25   BUN 27*   CREATININE 6.2*   CALCIUM 9.3   MG 2.2     CBC:   Recent Labs   Lab 06/09/19 0925   WBC 9.36   RBC 2.83*   HGB 8.9*   HCT 30.6*   *   *   MCH 31.4*   MCHC 29.1*     CMP:   Recent Labs   Lab 06/09/19 0925   *   CALCIUM 9.3      K 4.5   CO2 25      BUN 27*   CREATININE 6.2*     All labs within the past 24 hours have been reviewed.

## 2019-06-09 NOTE — PROGRESS NOTES
HD ended 37mins early due system clotting, blood returned. 2-liters of fluid removed, pt stable, tolerated treatment well, NAD noted. Lt femoral CVC continue to aspirate/flush w/o difficulty, lines flushed with NS, capped, & taped securely. Report given to Primary RN.  See HD flowsheet for treatment details.

## 2019-06-09 NOTE — PLAN OF CARE
Problem: Fall Injury Risk  Goal: Absence of Fall and Fall-Related Injury  Pt will remain free of falls during hospitalization.

## 2019-06-09 NOTE — CONSULTS
[facility]  Nephrology  Consult Note    Patient Name: Jenni Todd  MRN: [id number]  Admission Date: 2019  Hospital Length of Stay: 0 days  Attending Provider: ZENY Larry MD   Primary Care Physician: Michael Tan Iii, MD  Principal Problem:Complication of vascular access for dialysis    Inpatient consult to Nephrology  Consult performed by: Lux Tamez MD  Consult ordered by: ZENY Larry MD  Reason for consult: Cath malfunction, ESRD on HD inpatient dialysis treatment         Subjective:     HPI: Jenni Todd is a 47 yo AAF well known to our service.  She is an ESRD patient with multiple failed vascular access who presents to the ED on  for evaluation of malfunctioning hemodialysis catheter (which she has had previous admission for same reason). Was send directly for her dialysis unit since was unable to aspirate from her Left tunneled cath. Also has been presenting with more distended abdomen since the last 2 months.     Nephrology was consulted for inpatient dialysis treatment.     Past Medical History:   Diagnosis Date    Abnormal finding on Pap smear, HPV DNA positive     Anemia associated with chronic renal failure     on Epogen    Blood type B+     Bulging discs - symptomatic      CAD (coronary artery disease)     Cardiomyopathy 3/13/2019    Diabetes mellitus, type 2     ESRD (end stage renal disease) 2004    FSGS (focal segmental glomerulosclerosis)     with collapsing    Hyperlipidemia     Hypertension     Neuropathy     NSTEMI (non-ST elevated myocardial infarction) 3/29/2019    Obesity     Secondary hyperparathyroidism, renal     TIA (transient ischemic attack)     Uterine fibroid     small uterine        Past Surgical History:   Procedure Laterality Date    ANGIOGRAM, CORONARY ARTERY N/A 4/15/2019    Performed by [facility] at [facility] CATH LAB    CARDIAC CATHETERIZATION      PCI x 2     SECTION, CLASSIC       COLONOSCOPY N/A 9/21/2018    Performed by Rodrigue Russell MD at [facility] ENDO (2ND FLR)    DEBRIDEMENT-WOUND Left 4/18/2016    Performed by Teressa Maddox MD at [facility] OR    DIALYSIS FISTULA CREATION      multiple fistulas and grafts before PD     EGD (ESOPHAGOGASTRODUODENOSCOPY) N/A 3/14/2019    Performed by Adolph Davila MD at [facility] ENDO    EGD (ESOPHAGOGASTRODUODENOSCOPY) N/A 3/13/2019    Performed by Adolph Davila MD at [facility] ENDO    INCISION AND DRAINAGE (I&D), LABIAL N/A 4/17/2016    Performed by Harmony Fernandez MD at [facility] OR    INCISION AND DRAINAGE (I&D), LABIAL (ADD ON) Left 4/18/2016    Performed by Teressa Maddox MD at [facility] OR    INCISION AND DRAINAGE OF WOUND Left 2016    VULVAR ABCESS WITH NECROSIS    Insertion, Catheter, Central Venous, Hemodialysis N/A 3/28/2019    Performed by Kimo Weeks MD at [facility] CATH LAB    Insertion, Catheter, Central Venous, Hemodialysis N/A 3/18/2019    Performed by Kimo Weeks MD at [facility] CATH LAB    INSERTION, CATHETER, TUNNELED ABORTED Left 3/14/2019    Performed by Servando Solis MD at [facility] OR    INSERTION, GRAFT, ARTERIOVENOUS, RIGHT ARM Right 3/22/2019    Performed by BESSIE Wynn III, MD at [facility] OR 2ND FLR    INSERTION, INTRA-AORTIC BALLOON PUMP  4/15/2019    Performed by Roland Napier MD at [facility] CATH LAB    Left heart cath Left 4/15/2019    Performed by Roland Napier MD at [facility] CATH LAB    ORIF, HIP Left 9/13/2018    Performed by Tevin Grullon MD at [facility] OR    PERITONEAL CATHETER INSERTION      PERMCATH REWIRE- TUNNELED CATH REWIRE Left 11/13/2017    Performed by Baldev Munroe MD at [facility] CATH LAB    PERMCATH REWIRE- TUNNELED CATH REWIRE N/A 10/5/2017    Performed by Baldev Munroe MD at [facility] CATH LAB    REMOVAL, CATHETER, DIALYSIS, PERITONEAL N/A 3/14/2019    Performed by Servando Solis MD at [facility] OR    REPLACEMENT, CATHETER, DIALYSIS, OVER GUIDEWIRE, USING EXISTING VENOUS ACCESS Left  5/24/2019    Performed by [facility]dev [facility]roe MD at [facility] CATH LAB    UMBILICAL HERNIA REPAIR      Venogram, possible intervention Right 3/20/2019    Performed by BESSIE Wynn III, MD at [facility] CATH LAB       Review of patient's allergies indicates:   Allergen Reactions    Clindamycin Diarrhea    Flagyl [metronidazole hcl]     Metronidazole      Current Facility-Administered Medications   Medication Frequency    0.9%  NaCl infusion PRN    0.9%  NaCl infusion Once    acetaminophen tablet 500 mg Q8H PRN    aspirin EC tablet 81 mg Daily    calcitRIOL capsule 0.5 mcg Daily    cinacalcet tablet 90 mg Daily    clopidogrel tablet 75 mg Daily    ferrous sulfate EC tablet 325 mg BID    gabapentin capsule 300 mg BID    heparin (porcine) injection 5,000 Units Q8H    magnesium oxide tablet 400 mg Daily    midodrine tablet 10 mg TID WM    ondansetron disintegrating tablet 8 mg Q8H PRN    oxyCODONE immediate release tablet 5 mg Q6H PRN    promethazine tablet 25 mg Q6H PRN    ramelteon tablet 8 mg Nightly PRN    senna-docusate 8.6-50 mg per tablet 1 tablet BID PRN    sevelamer carbonate tablet 800 mg TID WM    sodium chloride 0.9% flush 10 mL PRN    vitamin renal formula (B-complex-vitamin c-folic acid) 1 mg per capsule 1 capsule Daily     Family History     Problem Relation (Age of Onset)    Cancer Maternal Grandmother, Paternal Grandfather    Diabetes Maternal Aunt, Paternal Aunt        Tobacco Use    Smoking status: Never Smoker    Smokeless tobacco: Never Used   Substance and Sexual Activity    Alcohol use: No     Comment: Pt reports some social use of about 1-2 drinks about every six months.    Drug use: No    Sexual activity: Not Currently     Partners: Male     Birth control/protection: None     Review of Systems   Constitutional: Negative for fatigue.   HENT: Negative for congestion, ear pain and sore throat.    Eyes: Negative for pain.   Respiratory: Negative for cough, chest tightness, shortness  of breath and stridor.    Cardiovascular: Negative for chest pain, palpitations and leg swelling.   Gastrointestinal: Positive for abdominal distention. Negative for abdominal pain, diarrhea, nausea and vomiting.   Endocrine: Negative for polydipsia and polyuria.   Genitourinary: Negative for decreased urine volume, difficulty urinating, dysuria, enuresis, hematuria and pelvic pain.   Musculoskeletal: Negative for back pain, joint swelling and neck stiffness.   Skin: Negative for color change, pallor and rash.   Neurological: Negative for syncope, weakness and light-headedness.   Psychiatric/Behavioral: Negative for agitation, confusion and hallucinations.     Objective:     Vital Signs (Most Recent):  Temp: 98.4 °F (36.9 °C) (06/09/19 1143)  Pulse: 92 (06/09/19 1143)  Resp: 18 (06/09/19 1143)  BP: (!) 146/93 (06/09/19 1143)  SpO2: 99 % (06/09/19 1143)  O2 Device (Oxygen Therapy): room air (06/09/19 0800) Vital Signs (24h Range):  Temp:  [96.1 °F (35.6 °C)-98.4 °F (36.9 °C)] 98.4 °F (36.9 °C)  Pulse:  [80-95] 92  Resp:  [16-24] 18  SpO2:  [95 %-100 %] 99 %  BP: ()/(51-93) 146/93     Weight: 99.5 kg (219 lb 5.7 oz) (06/09/19 0530)  Body mass index is 34.36 kg/m².  Body surface area is 2.17 meters squared.    I/O last 3 completed shifts:  In: 250 [Other:250]  Out: 2528 [Other:2528]    Physical Exam   Constitutional: She is oriented to person, place, and time. She appears well-developed. No distress.   HENT:   Head: Normocephalic and atraumatic.   Right Ear: External ear normal.   Left Ear: External ear normal.   Eyes: Conjunctivae and EOM are normal. Right eye exhibits no discharge. Left eye exhibits no discharge. No scleral icterus.   Neck: Normal range of motion. Neck supple.   Cardiovascular: Normal rate and regular rhythm. Exam reveals no gallop and no friction rub.   No murmur heard.  Pulmonary/Chest: Effort normal and breath sounds normal. No respiratory distress. She has no wheezes. She has no rales.    Abdominal: Soft. Bowel sounds are normal. She exhibits distension. There is no tenderness.   Musculoskeletal: Normal range of motion. She exhibits no edema or deformity.   Neurological: She is alert and oriented to person, place, and time.   Skin: Skin is warm and dry. She is not diaphoretic.   L femoral tunneled catheter   Psychiatric: She has a normal mood and affect. Her behavior is normal.       Significant Labs:  ABGs: No results for input(s): PH, PCO2, HCO3, POCSATURATED, BE in the last 168 hours.  BMP:   Recent Labs   Lab 06/09/19  0925   *      CO2 25   BUN 27*   CREATININE 6.2*   CALCIUM 9.3   MG 2.2     CBC:   Recent Labs   Lab 06/09/19  0925   WBC 9.36   RBC 2.83*   HGB 8.9*   HCT 30.6*   *   *   MCH 31.4*   MCHC 29.1*     CMP:   Recent Labs   Lab 06/09/19  0925   *   CALCIUM 9.3      K 4.5   CO2 25      BUN 27*   CREATININE 6.2*     All labs within the past 24 hours have been reviewed.      Assessment/Plan:     Ascites  - Patient currently presented with abdomen which per patient has been most recently feeling more distended. Had CT scan done recently in which presented with enlarge liver but with no description of cirrhosis.   - If discharge require further work up of ascites. Which not completely know to be from liver or cardiac etiology.     ESRD (end stage renal disease) on dialysis  Jenni Todd is a 48 year old woman who presented with with Left femoral tunneled cath malfunction which were unable to aspirate. In ED received tPA that were placed in each port. After easley received HD which was well tolerated, with UF 2 L.     [facility]HD [facility]Sa  At [facility] in [place]   Followed by Dr. Watt   Access Left Femoral HD tunneled cath   EDW?  Duration 4 hrs   No residual renal function   Last HD was on Thursday     Plan:  - Patient received HD yesterday for clearance and volume removal after placement of tPA in cath, with total UF 2 L  - Blood pressures have been  stable   - Patient will be send home today in which require  To continue HD treatment at outpatient dialysis treatment         [place]  Nephrology  Fellow  [facility]    Pager [phone]

## 2019-06-09 NOTE — PROGRESS NOTES
3-hour bedside HD started per Nephrology orders, via Lt femoral CVC with lines taped securely. Cathflo 2mg aspirated with ease from both A/V (Red/Blue) ports of Lt femoral CVC. Both ports aspirate/flush w/o difficulty. Pt stable, resting in bed, watching TV, NAD noted. Will monitor pt closely while on HD. See HD flowsheet for treatment details.

## 2019-06-09 NOTE — ASSESSMENT & PLAN NOTE
- Patient currently presented with abdomen which per patient has been most recently feeling more distended. Had CT scan done recently in which presented with enlarge liver but with no description of cirrhosis.   - If discharge require further work up of ascites.

## 2019-06-10 NOTE — TELEPHONE ENCOUNTER
Patient is inquiring about treatment options after finding out the results of her CT scan, Dr. Alonso is concerned about the amount of ascites found and per his request will consult with her nephrology doctor before moving forward.Pt v/u

## 2019-06-11 NOTE — DISCHARGE SUMMARY
"[facility] Hospital Medicine  Discharge Summary      Patient Name: Jenni Todd  MRN: [id number]  Admission Date: 6/8/2019  Hospital Length of Stay: 0 days  Discharge Date and Time: 6/9/2019  3:46 PM  Attending Physician: Xin att. providers found   Discharging Provider: Rona Noonan PA-C  Primary Care Provider: Michael Tan Iii, MD    [facility] Medicine Team: [facility] MED E Rona Noonan PA-C    HPI:Ms. Todd is a 49yo lady with a past medical history of ESRD on HD on TThS, anemia, CAD, DCHF and HLD.  She was recently placed into observation status on 5/29-5/30 of last month due to malfunction of her HD catheter.  Prior to initiating HD in February of this year, she had been on PD, stopped due to recurrent episodes of peritonitis.  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  She had to have a tunneled dialysis catheter placed to the left femoral artery on 3/28 with a rewiring on 5/23.  Subsequent HD on 3/28 failed leading to her admission on 5/29.     As MAICO Montoya noted on that stay, "Cathflo instilled for four hours, HD given but flow sluggish and had to be de-clotted. Dr. Weeks evaluated patient, no re-wiring at this time, but recommending cathflo to tip of catheter until Saturday HD as she likely has some fibrin clots. Patient discharged to make appointment with Dr. Alonso in surgery to discuss PD. Follow up with PCP in 1 week. Patient verbalized understanding. All questions answered."     She now presents to our ED from [facility] on [address] due to left femoral tunnel catheter dysfunction.  Her last successful HD was last Thursday.  At [facility] today, she was unable to get flow from the left HD femoral catheter.  Unfortunately they only had one vial of Cath-fanny in the dialysis clinic and it did not work, so she was referred to our ED.  In the ED the dialysis nurse " instilled TPA into the catheter and was able to improve the flow some.    * No surgery found *      Hospital Course: Patient admitted for vascular access complications. Cathflo given given and HD completed, pt tolerated well without complication. Suspect some of the issue may be positional as outpt HD center only has chairs and does not have cathflo according to pt. Therefore, access complications are likely to occur again. Pt voiced understanding and said she was working on getting established at a different HD location outpt. Additionally, she is currently being worked up for moderate abd ascites as seen on CT. No evidence/hx of liver disease. Discussed paracentesis in hospital, but she wishes to defer for now and follow up outpt. Follow up with outpt general surgeon and refer to hepatology outpt for further eval. Return precautions discussed, no further questions at d/c.     Consults:   Consults (From admission, onward)        Status Ordering Provider     Inpatient consult to Nephrology  Once     Provider:  (Not yet assigned)    Completed ZENY HARRIS          Final Active Diagnoses:    Diagnosis Date Noted POA    PRINCIPAL PROBLEM:  Complication of vascular access for dialysis [T82.9XXA] 04/06/2019 Yes    Ascites [R18.8] 06/09/2019 Yes    Dialysis patient [Z99.2] 06/08/2019 Not Applicable    Moderate to severe pulmonary hypertension [I27.20] 04/25/2019 Yes    ESRD (end stage renal disease) on dialysis [N18.6, Z99.2] 09/12/2018 Not Applicable    Hypertensive renal disease [I12.9] 12/10/2014 Yes    Anemia in chronic kidney disease, on chronic dialysis [N18.6, D63.1, Z99.2]  Not Applicable    CAD (coronary artery disease) [I25.10]  Yes    Type 2 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 and hypertension [E11.22, I12.0, N18.5]  Yes    ESRD (end stage renal disease) on PD ( initiated dialysis 04/20/2004) [N18.6] 04/20/2004 Yes      Problems Resolved During this Admission:      Discharged  Condition: stable    Disposition: Home or Self Care    Follow Up:  Follow-up Information     Sung Alonso MD In 1 week.    Specialties:  General Surgery, Bariatrics  Contact information:  [address]  [facility] [address]  [phone]             Michael Tan Iii, MD In 1 week.    Specialty:  Family Medicine  Contact information:  [address]  [address]  [place] [address]  [phone]                 Patient Instructions:      Ambulatory Referral to Hepatology   Referral Priority: Routine Referral Type: Consultation   Referral Reason: Specialty Services Required   Requested Specialty: Hepatology   Number of Visits Requested: 1     Notify your health care provider if you experience any of the following:  temperature >100.4     Notify your health care provider if you experience any of the following:  redness, tenderness, or signs of infection (pain, swelling, redness, odor or green/yellow discharge around incision site)     Notify your health care provider if you experience any of the following:  worsening rash     Activity as tolerated     Medications:  Reconciled Home Medications:      Medication List      CHANGE how you take these medications    ferrous sulfate 325 (65 FE) MG EC tablet  Take 1 tablet (325 mg total) by mouth 2 (two) times daily.  What changed:  when to take this     magnesium oxide 400 mg (241.3 mg magnesium) tablet  Commonly known as:  MAG-OX  Take 1 tablet (400 mg total) by mouth 3 (three) times daily.  What changed:  when to take this        CONTINUE taking these medications    acetaminophen 325 MG tablet  Commonly known as:  TYLENOL  Take 650 mg by mouth every 6 (six) hours as needed for Pain.     aspirin 81 MG EC tablet  Commonly known as:  ECOTRIN  Take 1 tablet (81 mg total) by mouth once daily.     atorvastatin 40 MG tablet  Commonly known as:  LIPITOR  Take 40 mg by mouth every evening.     CALCI-CHEW 500 mg calcium (1,250 mg) chewable tablet  Generic drug:  calcium  "carbonate  Take 500 mg by mouth.     calcitRIOL 0.25 MCG Cap  Commonly known as:  ROCALTROL  Take 0.5 mcg by mouth once daily.     cetirizine 5 MG tablet  Commonly known as:  ZYRTEC  Take 1 tablet (5 mg total) by mouth once daily.     cinacalcet 90 MG Tab  Commonly known as:  SENSIPAR  Take 90 mg by mouth once daily.     clopidogrel 75 mg tablet  Commonly known as:  PLAVIX  Take 1 tablet (75 mg total) by mouth once daily.     gabapentin 300 MG capsule  Commonly known as:  NEURONTIN  Take 1 capsule (300 mg total) by mouth 2 (two) times daily.     insulin aspart U-100 100 unit/mL (3 mL) Inpn pen  Commonly known as:  NovoLOG  Inject 0-5 Units into the skin before meals and at bedtime as needed (Hyperglycemia).     midodrine 10 MG tablet  Commonly known as:  PROAMATINE  Take 1 tablet (10 mg total) by mouth 3 (three) times daily with meals.     NEPHROCAPS 1 mg Cap  Generic drug:  vitamin renal formula (B-complex-vitamin c-folic acid)  Take 1 capsule by mouth once daily. 1 Capsule Oral Every day     ONETOUCH VERIO Strp  Generic drug:  blood sugar diagnostic  USE 1 STRIP ONCE DAILY IN VITRO     pantoprazole 40 MG tablet  Commonly known as:  PROTONIX  Take 1 tablet (40 mg total) by mouth once daily.     pen needle, diabetic 31 gauge x 3/16" Ndle  1 each by Misc.(Non-Drug; Combo Route) route 4 (four) times daily before meals and nightly.     senna-docusate 8.6-50 mg 8.6-50 mg per tablet  Commonly known as:  PERICOLACE  Take 1 tablet by mouth 2 (two) times daily as needed for Constipation.     sevelamer carbonate 800 mg Tab  Commonly known as:  RENVELA  Take 1 tablet (800 mg total) by mouth 3 (three) times daily with meals.            Significant Diagnostic Studies: K+ WNL    Pending Diagnostic Studies:     None        Indwelling Lines/Drains at time of discharge:   Lines/Drains/Airways     Central Venous Catheter Line                 Hemodialysis Catheter 05/24/19 1111 left femoral 18 days          Drain                 " Hemodialysis AV Fistula Left upper arm -- days         Hemodialysis AV Graft Right upper arm -- days                Time spent on the discharge of patient: >35 minutes  Patient was seen and examined on the date of discharge and determined to be suitable for discharge.       Discussed with staff and nephro  Rona Noonan PA-C  Department of Hospital Medicine  [facility]

## 2019-06-12 NOTE — PROGRESS NOTES
Subjective:    Patient ID:  Jenni Todd is a 48 y.o. female who presents for evaluation of Consult ([facility])      HPI     History of CAd in 2012 she underwent 2 coronary stent. She underwent coronary angiogram in April and had 3 vessel disease. She is a symptomatic except for RETANA FC II III NYHA      Had a prolonged hospitalization but see cards consult    Hospital Course:   NSTEMI (non-ST elevated myocardial infarction)  -s/p ACS protocol (DAPT, statin, heparin drip stopped)   -NSTEMI with troponin up to 16, [facility] with 3 vessel disease 99% LAD, 80%LCX, 100% RCA, LVEDP in 30s, hypotensive, IABP placed. CT surgery consulted for CABG Eval, not a candidate due to comorbidities.   -HTS consult as outpatient to discuss advanced options        Acute systolic heart failure  Not decompensated. Balloon pump removed.   -LV EF of 25%, GDMT initiated with small doses of ACEI and BB. Uptitrate as outpatient  Not a candidate for revascularization, will need consultation with HTS for advanced options as an outpatient.   Cont HD as outpatient for volume removal      April 14 2019    · Severely decreased left ventricular systolic function. The estimated ejection fraction is 25%  · Left ventricular diastolic dysfunction.  · Local segmental wall motion abnormalities.  · Low normal right ventricular systolic function.  · Severe left atrial enlargement.  · Mild-to-moderate aortic valve stenosis.  · Aortic valve area is 1.48 cm2; peak velocity is 1.91 m/s; mean gradient is 10.27 mmHg.  · Moderate mitral sclerosis.  · Moderate to moderate-severe (2-3+) mitral regurgitation.  · Mild to moderate tricuspid regurgitation.  · Mild pulmonic regurgitation.  · Normal central venous pressure (3 mm Hg).  · The estimated PA systolic pressure is 32 mm Hg        Left Main   There is moderate diffuse disease throughout the vessel.   Left Anterior Descending   Prox LAD lesion is 99% stenosed.   Left Circumflex   Prox Cx lesion is 80% stenosed.   Right  Coronary Artery   Mid RCA to Dist RCA lesion is 100% stenosed.       Review of Systems   Constitution: Negative for chills, decreased appetite, diaphoresis, fever, malaise/fatigue, night sweats, weight gain and weight loss.   Cardiovascular: Positive for dyspnea on exertion. Negative for chest pain, claudication, cyanosis, irregular heartbeat, leg swelling, near-syncope, orthopnea and palpitations.   Respiratory: Negative for cough, hemoptysis, shortness of breath, sleep disturbances due to breathing, snoring, sputum production and wheezing.    Gastrointestinal: Negative for abdominal pain and diarrhea.   Neurological: Negative for weakness.        Objective:    Physical Exam   Constitutional: She is oriented to person, place, and time. She appears well-developed and well-nourished.   HENT:   Head: Normocephalic and atraumatic.   Eyes: Pupils are equal, round, and reactive to light. Conjunctivae and EOM are normal.   Neck: Neck supple. No JVD present. No tracheal deviation present. No thyromegaly present.   Cardiovascular: Normal rate and regular rhythm. Exam reveals no gallop and no friction rub.   Murmur heard.   Harsh midsystolic murmur is present with a grade of 3/6 at the upper right sternal border radiating to the neck.  Pulmonary/Chest: Effort normal and breath sounds normal. No respiratory distress. She has no wheezes. She has no rales. She exhibits no tenderness.   Abdominal: Soft. Bowel sounds are normal. She exhibits no distension and no mass. There is no tenderness. There is no rebound and no guarding.   Musculoskeletal: Normal range of motion. She exhibits no edema or tenderness.   Lymphadenopathy:     She has no cervical adenopathy.   Neurological: She is alert and oriented to person, place, and time. She has normal reflexes. No cranial nerve deficit. She exhibits normal muscle tone. Coordination normal.   Skin: Skin is warm and dry.   Psychiatric: She has a normal mood and affect. Her behavior is  normal. Thought content normal.         Assessment:       1. DDD (degenerative disc disease), lumbar    2. Diabetic polyneuropathy associated with type 2 diabetes mellitus    3. 3-vessel coronary artery disease    4. Acute on chronic combined systolic and diastolic heart failure    5. Coronary artery disease involving native coronary artery of native heart without angina pectoris    6. Chronic combined systolic and diastolic heart failure    7. NSTEMI (non-ST elevated myocardial infarction)    8. PAD (peripheral artery disease)    9. Hypertension associated with diabetes    10. Problem with dialysis access, subsequent encounter    11. Multiple wounds         Plan:       HFrEF coronary artery disease 3 vessel (not a surgical candidate)    PET scan to check for ischemia if she is ischemic we may try intervention    COntinue medical therapy    If she requires advanced care will be heart kidney. She was not told that she was coming here to talk about advanced care with MARCELLA

## 2019-06-12 NOTE — LETTER
June 12, 2019        Gurvinder Watt  [address]  [address]  [facility]  [place] [address]  Phone: [phone]  Fax: [phone]     Clare Dejesus  [address] [facility] [address]  Phone: [phone]  Fax: [phone]             [facility] [address]  [place] [address]  Phone: [phone]   Patient: Jenni Todd   MR Number: [id number]   YOB: 1970   Date of Visit: 6/12/2019       Dear Dr. Gurvinder Watt, Clare Dejesus    Thank you for referring Jenni Todd to me for evaluation. Attached you will find relevant portions of my assessment and plan of care.    If you have questions, please do not hesitate to call me. I look forward to following Jenni Todd along with you.    Sincerely,    Garrett Araujo MD    Enclosure    If you would like to receive this communication electronically, please contact [email or web address] or [phone] to request GERS Link access.    [employer]ERS Link is a tool which provides read-only access to select patient information with whom you have a relationship. Its easy to use and provides real time access to review your patients record including encounter summaries, notes, results, and demographic information.    If you feel you have received this communication in error or would no longer like to receive these types of communications, please e-mail [email or web address]

## 2019-06-12 NOTE — LETTER
June 12, 2019    Jenni Todd  [address] [facility] [address]      Dear Jenni Todd:    Your doctor has referred you to the [facility]. We are sending this letter to remind you to make an appointment with us to complete the referral process.     Please call us at [phone] to schedule an appointment. We look forward to seeing you soon.     If you received a call and have been scheduled, please disregard this letter.       Sincerely,        [facility]   [facility] [place], [place] [id number] [phone]

## 2019-06-12 NOTE — NURSING
Pt records reviewed.   Pt will be referred to Hepatology.    Initial referral received  from the workque.   Referring Provider/diagnosis  JIMENA THAPA Provider:   Diagnosis: Other ascites   Supervising MD             Referral letter sent to patient.

## 2019-06-15 NOTE — PROCEDURES
Jenni Todd is a 48 y.o.  female patient, who presents for a 6 minute walk test ordered by Garrett Araujo MD.  The diagnosis is Shortness of Breath; Congestive Heart Failure.  The patient's BMI is 33.7 kg/m2.  Predicted distance (lower limit of normal) is 382.04 meters.      Test Results:    The test was not completed.  The patient stopped 1 time for a total of 328 seconds.  The total time walked was 32 seconds.  During walking, the patient reported:  Leg pain; Unsteady gait. The patient used a wheelchair for assistance during testing.     06/12/2019---------Distance: 30.48 meters (100 feet)     O2 Sat % Supplemental Oxygen Heart Rate Blood Pressure Ricco Scale   Pre-exercise  (Resting) 100 % Room Air 82 bpm 101/58 mmHg 0   During Exercise 100 % Room Air 103 bpm 114/70 mmHg 0   Post-exercise  (Recovery) 100 % Room Air  97 bpm       Recovery Time:  70 seconds    Performing nurse/tech:  Sabrina HEART      PREVIOUS STUDY:   The patient has not had a previous study.      CLINICAL INTERPRETATION:  Six minute walk distance is 30.48 meters (100 feet) with no dyspnea.  During exercise, there was no desaturation while breathing room air.  Blood pressure remained stable and Heart rate increased significantly with walking.  The patient reported non-pulmonary symptoms during exercise.  Severe exercise impairment is likely due to subjective symptoms.  The patient did not complete the study, walking 32 seconds of the 360 second test.  No previous study performed.  Based upon age and body mass index, exercise capacity is less than predicted.

## 2019-06-16 NOTE — PROVIDER PROGRESS NOTES - EMERGENCY DEPT.
Case discussed with Dr. Cartwright.  Patient being evaluated for hypotension, nausea, vomiting and diarrhea.  Patient lab work reviewed.  Troponin negative. BNP chronically elevated.  Kidney function at baseline.  Has not had any vomiting or diarrhea during her time in the emergency department.  Her blood pressure has normalized.  Will discharge home with Zofran and Protonix.  Return precautions advised.

## 2019-06-16 NOTE — ED PROVIDER NOTES
Encounter Date: 6/16/2019    SCRIBE #1 NOTE: I, Radha Vitale, am scribing for, and in the presence of,  Dr. Cartwright. I have scribed the following portions of the note - Other sections scribed: HPI, ROS, PE.       History     Chief Complaint   Patient presents with    Diarrhea     diarrhea and vomiting     48 y.o. Female with PMHx of  ESRD-HD TTS, HTN, CAD, CHF (EF 25%) presents with chief complaint of nausea and vomiting. Patient reports nausea and vomiting began today. She has had two episodes of vomiting. Nausea has since resolved. Also associated constant diarrhea. No recent antibiotic use. Vomit is described as green. Patient has had recent admission earlier this year for duodenal hemorrhage at which time vomit was described as green and dark. That hospitalization was for peritonitis 2/2 peritoneal dialysis. She is no longer on peritoneal dialysis. 3 weeks prior, pt had outpatient CT for increased abdominal distention which revealed moderate amount of ascites.Today, patient reports the distention has worsened, but denies abdominal pain. However, she does report some pressure of the abdomen. Patient currently on hemodialysis, last done yesterday without complications.     The history is provided by the patient and medical records.     Review of patient's allergies indicates:   Allergen Reactions    Clindamycin Diarrhea    Flagyl [metronidazole hcl]     Metronidazole      Past Medical History:   Diagnosis Date    Abnormal finding on Pap smear, HPV DNA positive 2014    Anemia associated with chronic renal failure     on Epogen    Blood type B+     Bulging discs - symptomatic      CAD (coronary artery disease)     Cardiomyopathy 3/13/2019    Diabetes mellitus, type 2     ESRD (end stage renal disease) 04/20/2004    FSGS (focal segmental glomerulosclerosis)     with collapsing    Hyperlipidemia     Hypertension 2004    Neuropathy     NSTEMI (non-ST elevated myocardial infarction) 3/29/2019     Obesity     Secondary hyperparathyroidism, renal     TIA (transient ischemic attack)     Uterine fibroid     small uterine      Past Surgical History:   Procedure Laterality Date    ANGIOGRAM, CORONARY ARTERY N/A 4/15/2019    Performed by Roland Napier MD at [facility] CATH LAB    CARDIAC CATHETERIZATION      PCI x 2     SECTION, CLASSIC      COLONOSCOPY N/A 2018    Performed by Rodrigue Russell MD at [facility] ENDO (2ND FLR)    DEBRIDEMENT-WOUND Left 2016    Performed by Teressa Maddox MD at [facility] OR    DIALYSIS FISTULA CREATION      multiple fistulas and grafts before PD     EGD (ESOPHAGOGASTRODUODENOSCOPY) N/A 3/14/2019    Performed by Adolph Davila MD at [facility] ENDO    EGD (ESOPHAGOGASTRODUODENOSCOPY) N/A 3/13/2019    Performed by Adolph Davila MD at [facility] ENDO    INCISION AND DRAINAGE (I&D), LABIAL N/A 2016    Performed by Harmony Fernandez MD at [facility] OR    INCISION AND DRAINAGE (I&D), LABIAL (ADD ON) Left 2016    Performed by Teressa Maddox MD at [facility] OR    INCISION AND DRAINAGE OF WOUND Left     VULVAR ABCESS WITH NECROSIS    Insertion, Catheter, Central Venous, Hemodialysis N/A 3/28/2019    Performed by Kimo Weeks MD at [facility] CATH LAB    Insertion, Catheter, Central Venous, Hemodialysis N/A 3/18/2019    Performed by Kimo Weeks MD at [facility] CATH LAB    INSERTION, CATHETER, TUNNELED ABORTED Left 3/14/2019    Performed by Servando Solis MD at [facility] OR    INSERTION, GRAFT, ARTERIOVENOUS, RIGHT ARM Right 3/22/2019    Performed by BESSIE Wynn III, MD at [facility] OR 2ND FLR    INSERTION, INTRA-AORTIC BALLOON PUMP  4/15/2019    Performed by Roland Napier MD at [facility] CATH LAB    Left heart cath Left 4/15/2019    Performed by Roland Napier MD at [facility] CATH LAB    ORIF, HIP Left 2018    Performed by Tevin Grullon MD at [facility] OR    PERITONEAL CATHETER INSERTION      PERMCATH REWIRE- TUNNELED CATH REWIRE Left 2017     Performed by Baldev Munroe MD at [facility] CATH LAB    PERMCATH REWIRE- TUNNELED CATH REWIRE N/A 10/5/2017    Performed by Baldev Munroe MD at [facility] CATH LAB    REMOVAL, CATHETER, DIALYSIS, PERITONEAL N/A 3/14/2019    Performed by Servando Solis MD at [facility] OR    REPLACEMENT, CATHETER, DIALYSIS, OVER GUIDEWIRE, USING EXISTING VENOUS ACCESS Left 5/24/2019    Performed by Baldev Munroe MD at [facility] CATH LAB    UMBILICAL HERNIA REPAIR      Venogram, possible intervention Right 3/20/2019    Performed by BESSIE Wynn III, MD at [facility] CATH LAB     Family History   Problem Relation Age of Onset    Cancer Maternal Grandmother     Cancer Paternal Grandfather     Diabetes Maternal Aunt     Diabetes Paternal Aunt     Kidney disease Neg Hx     Heart disease Neg Hx     Ovarian cancer Neg Hx     Breast cancer Neg Hx      Social History     Tobacco Use    Smoking status: Never Smoker    Smokeless tobacco: Never Used   Substance Use Topics    Alcohol use: No     Comment: Pt reports some social use of about 1-2 drinks about every six months.    Drug use: No     Review of Systems   Constitutional: Negative for chills and fever.   HENT: Negative for congestion, rhinorrhea and sore throat.    Eyes: Negative for visual disturbance.   Respiratory: Negative for cough and shortness of breath.    Cardiovascular: Negative for chest pain.   Gastrointestinal: Positive for abdominal distention, diarrhea, nausea and vomiting. Negative for abdominal pain.   Genitourinary: Negative for flank pain and pelvic pain.   Musculoskeletal: Negative for myalgias.   Skin: Negative for color change.   Neurological: Negative for speech difficulty.       Physical Exam     Initial Vitals [06/16/19 1321]   BP Pulse Resp Temp SpO2   (!) 74/51 100 16 98.3 °F (36.8 °C) 100 %      MAP       --         Physical Exam    Nursing note and vitals reviewed.  Constitutional: She appears well-developed and well-nourished. She is not diaphoretic. No  distress.   HENT:   Head: Normocephalic and atraumatic.   Eyes: Conjunctivae and EOM are normal.   Neck: Normal range of motion. Neck supple. No JVD present.   Cardiovascular: Normal rate, regular rhythm and intact distal pulses. Exam reveals no gallop and no friction rub.    Murmur (diastolic) heard.  Pulmonary/Chest: Breath sounds normal. No respiratory distress. She has no wheezes. She has no rhonchi. She has no rales. She exhibits no tenderness.   Abdominal: Bowel sounds are normal. She exhibits distension. She exhibits no mass. There is no tenderness. There is no rebound and no guarding.   Abdomen is firm.   Musculoskeletal: Normal range of motion. She exhibits no tenderness.   No pitting edema bilaterally   Lymphadenopathy:     She has no cervical adenopathy.   Neurological: She is alert and oriented to person, place, and time. She has normal strength. No sensory deficit.   Skin: Skin is warm and dry.   Psychiatric: She has a normal mood and affect.         ED Course   Procedures  Labs Reviewed   ISTAT PROCEDURE - Abnormal; Notable for the following components:       Result Value    POC Glucose 139 (*)     POC Creatinine 6.8 (*)     POC Ionized Calcium 1.00 (*)     POC Hematocrit 29 (*)     All other components within normal limits   CBC W/ AUTO DIFFERENTIAL   COMPREHENSIVE METABOLIC PANEL   LIPASE   URINALYSIS, REFLEX TO URINE CULTURE   LACTIC ACID, PLASMA   MAGNESIUM   ISTAT CHEM8     EKG Readings: (Independently Interpreted)   Rhythm: Normal Sinus Rhythm. Heart Rate: 89. ST Segments: Normal ST Segments. T Waves Flipped: I, II, V5 and V6. Axis: Normal.       Imaging Results    None          Medical Decision Making:   History:   Old Medical Records: I decided to obtain old medical records.  Initial Assessment:   48 y.o. Female with PMHx of  ESRD-HD TTS, HTN, CAD, CHF (EF 25%) presents with chief complaint of nausea and vomiting.   Differential Diagnosis:   My differential diagnosis includes, but is not  limited to:  Dehydration, electrolyte abnormalities, hypervolemia, gastroenteritis.  Clinical Tests:   Lab Tests: Ordered and Reviewed  Radiological Study: Ordered and Reviewed  ED Management:  Despite low blood pressure at triage, patient is very well-appearing with an improved blood pressure well evaluated in the ED.  Will administer 500 cc bolus given end-stage renal disease status.  Will obtain labs.  As patient reports nausea resolved, will hold Zofran.  No recent antibiotic use to be a risk factor for C diff.  Will observe in the ED for clinical improvement.  At this time my shift is coming to a close and the patient was signed out to vaughn SKINNER.            Elliotibe Attestation:   Scribe #1: I performed the above scribed service and the documentation accurately describes the services I performed. I attest to the accuracy of the note.    Attending Attestation:           Physician Attestation for Scribe:      Comments: I, Dr. Collin Cartwright, personally performed the services described in this documentation. All medical record entries made by the scribe were at my direction and in my presence.  I have reviewed the chart and agree that the record reflects my personal performance and is accurate and complete. Collin Cartwright MD.  2:06 PM 06/16/2019                 Clinical Impression:       ICD-10-CM ICD-9-CM   1. Gastroenteritis K52.9 558.9   2. Nausea and vomiting R11.2 787.01                                Collin Cartwright MD  06/16/19 1406       Collin Cartwright MD  06/16/19 1404

## 2019-06-16 NOTE — DISCHARGE INSTRUCTIONS
Take medication as prescribed   Return if you are unable to tolerate any fluids, your vomiting or diarrhea worsens or you blood pressure remains low

## 2019-06-16 NOTE — ED TRIAGE NOTES
Jenni Todd, a 48 y.o. female presents to the ED c/o diarrhea and vomiting x couple days, patient also is complaining of abdominal distention. Pt does dialysis tuesdays, thursdays, and saturdays.  Patient denies recent abx use.       Chief Complaint   Patient presents with    Diarrhea     diarrhea and vomiting     Review of patient's allergies indicates:   Allergen Reactions    Clindamycin Diarrhea    Flagyl [metronidazole hcl]     Metronidazole      Past Medical History:   Diagnosis Date    Abnormal finding on Pap smear, HPV DNA positive 2014    Anemia associated with chronic renal failure     on Epogen    Blood type B+     Bulging discs - symptomatic      CAD (coronary artery disease)     Cardiomyopathy 3/13/2019    Diabetes mellitus, type 2     ESRD (end stage renal disease) 04/20/2004    FSGS (focal segmental glomerulosclerosis)     with collapsing    Hyperlipidemia     Hypertension 2004    Neuropathy     NSTEMI (non-ST elevated myocardial infarction) 3/29/2019    Obesity     Secondary hyperparathyroidism, renal     TIA (transient ischemic attack)     Uterine fibroid     small uterine      LOC: Patient name and date of birth verified. The patient is awake, alert and aware of environment with an appropriate affect, the patient is oriented x 3 and speaking appropriately.   APPEARANCE: Patient resting comfortably, patient is clean and well groomed, patient's clothing is properly fastened.  SKIN: The skin is warm and dry, color consistent with ethnicity, patient has normal skin turgor and moist mucus membranes, skin intact, no breakdown or bruising noted.  MUSCULOSKELETAL: Patient moving all extremities well, no obvious swelling or deformities noted.   RESPIRATORY: Respirations are spontaneous, patient has a normal effort and rate, no accessory muscle use noted. Patient denies SOB at this time.   CARDIAC: Patient has a normal rate and rhythm, no periphreal edema noted, capillary refill <  3 seconds. Pt denies chest pain at this time.   ABDOMEN: Hard and non tender to palpation, obvious distention noted.  NEUROLOGIC: Eyes open spontaneously, behavior appropriate to situation, follows commands, facial expression symmetrical, bilateral hand grasp equal and even, purposeful motor response noted, normal sensation in all extremities when touched with a finger.

## 2019-06-17 NOTE — TELEPHONE ENCOUNTER
Pt calling in reference to her CT results and how we are moving forward.  Explained to pt that she needed to f/u with her nephrologist in reference to her ascites on CT scan.  She states she thought that Dr. Alonso was discussing that with him. Will send message to Dr. Alonso to clarify.

## 2019-06-17 NOTE — PROGRESS NOTES
Returned pt phone call and informed her that I will discuss with Dr. Alonso, but know he was wanting her nephrologist to weigh in on her ascites before proceeding.

## 2019-06-17 NOTE — TELEPHONE ENCOUNTER
----- Message from Kiran Lou sent at 6/17/2019  4:26 PM CDT -----  Contact: Pt  Needs Advice    Reason for call:The caller states that she wanted to find out about the procedure that she needs to have.        Communication Preference:[phone]    Additional Information:

## 2019-06-20 PROBLEM — Z78.9 PROBLEM WITH VASCULAR ACCESS: Status: ACTIVE | Noted: 2019-01-01

## 2019-06-20 NOTE — ED NOTES
Hourly rounding complete. Patient resting comfortably in stretcher and is in NAD at this time. Pt is awake alert and oriented x4, meal tray requested and ordered, okay for pt to eat per Cat AGUILAR Viramontes. Pt denies pain at this time. Pt updated on POC. Bed low and locked. SR up x2. Call bell in pt reach. Pt voices no other needs at this time.

## 2019-06-20 NOTE — NURSING
Saw patient bedside in ER to instill Cathflo into each catheter lumen. The procedure was discussed with the patient. Allergies verified. Each catheter lumen was cleaned with chloraprep.The Cathflo was prepared per package directions and instilled to fill each lumen. Arterial lumen = 3ml; Venous lumen = 3ml. The catheter was clamped, capped, and taped securely. A red label was timed and applied.

## 2019-06-20 NOTE — ED NOTES
Hourly rounding complete. Patient resting comfortably in stretcher and is in NAD at this time. Pt is awake alert and oriented x4, VSS. Pt denies pain at this time. Pt updated on POC. Bed low and locked. SR up x2. Call bell in pt reach. Pt voices no needs at this time.

## 2019-06-20 NOTE — ED TRIAGE NOTES
Jenni Todd, a 48 y.o. female presents to the ED via personal transport with CC hemodialysis catheter to left groin clotted, due for dialysis today, last dialyzed Tuesday. No complaints at this time.      Patient identifiers verified verbally with patient and correct for Jenni Todd.    LOC/ APPEARANCE: The patient is AAOx4. Pt is speaking appropriately, no slurred speech. Pt changed into hospital gown. Continuous cardiac monitor, cont pulse ox, and auto BP cuff applied to patient. Pt is clean and well groomed. No JVD visible. Pt reports pain level of 0. Pt updated on POC. Bed low and locked with side rails up x2, call bell in pt reach.  SKIN: Skin is warm dry and intact, and color is consistent with ethnicity. Capillary refill <3 seconds. No breakdown or brusing visible. Mucus membranes moist, acyanotic.  RESPIRATORY: Airway is open and patent. Respirations-spontaneous, unlabored, regular rate, equal bilaterally on inspiration and expiration. No accessory muscle use noted. Lungs clear to auscultation in all fields bilaterally anterior and posterior.   CARDIAC: Patient has regular heart rate.  Patient has no c/o chest pain. +2 pitting edema noted to BLE. Peripheral pulses present equal and strong throughout.  ABDOMEN: Soft and non-tender to palpation with no distention noted. Normoactive bowel sounds x4 quadrants. Pt has no complaints of abnormal bowel movements, denies blood in stool. Pt reports normal appetite.   NEUROLOGIC: Eyes open spontaneously and facial expression symmetrical. Pt behavior appropriate to situation, and pt follows commands. Pt reports sensation present in all extremities when touched with a finger, denies any numbness or tingling. PERRLA  MUSCULOSKELETAL: Spontaneous movement noted to all extremities. Hand  equal and leg strength strong +5 bilaterally.

## 2019-06-20 NOTE — ED NOTES
Hourly rounding complete. Patient resting comfortably in stretcher and is in NAD at this time. Pt is awake alert and oriented x4, VSS. Pt denies pain at this time. Pt updated on POC. Bed low and locked. SR up x2. Call bell in pt reach. Pt provided blanket, voices no other needs at this time.

## 2019-06-20 NOTE — ED PROVIDER NOTES
Encounter Date: 2019       History     Chief Complaint   Patient presents with    Vascular Access Problem     Went to dialysis and was unable to get dialysis     2:11 PM  Patient is a 48 year old female with a PMHx of  ESRD-HD TTS, HTN, CAD, CHF (EF 25%) who presents the ED with possibly clotted PermCath to left groin.   Patient states yesterday, they tried to declot her graft but had issues.  Again today while at the center, they wanted to attempt again but they did not have any more Activase at the clinic.  She denies having any leg or abdominal pain. 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  She had to have a tunneled dialysis catheter placed to the left femoral artery on 3/28 with a rewiring on .         Review of patient's allergies indicates:   Allergen Reactions    Clindamycin Diarrhea    Flagyl [metronidazole hcl]     Metronidazole      Past Medical History:   Diagnosis Date    Abnormal finding on Pap smear, HPV DNA positive     Anemia associated with chronic renal failure     on Epogen    Blood type B+     Bulging discs - symptomatic      CAD (coronary artery disease)     Cardiomyopathy 3/13/2019    Diabetes mellitus, type 2     ESRD (end stage renal disease) 2004    FSGS (focal segmental glomerulosclerosis)     with collapsing    Hyperlipidemia     Hypertension     Neuropathy     NSTEMI (non-ST elevated myocardial infarction) 3/29/2019    Obesity     Secondary hyperparathyroidism, renal     TIA (transient ischemic attack)     Uterine fibroid     small uterine      Past Surgical History:   Procedure Laterality Date    ANGIOGRAM, CORONARY ARTERY N/A 4/15/2019    Performed by Roland [facility]ier MD at [facility] CATH LAB    CARDIAC CATHETERIZATION      PCI x 2     SECTION, CLASSIC      COLONOSCOPY N/A 2018    Performed by Rodrigue Russell MD at [facility] ENDO (2ND FLR)     DEBRIDEMENT-WOUND Left 4/18/2016    Performed by Teressa Maddox MD at [facility] OR    DIALYSIS FISTULA CREATION      multiple fistulas and grafts before PD     EGD (ESOPHAGOGASTRODUODENOSCOPY) N/A 3/14/2019    Performed by Adolph [facility]ila MD at [facility] ENDO    EGD (ESOPHAGOGASTRODUODENOSCOPY) N/A 3/13/2019    Performed by Adolph [facility]ila [facility] at [facility] ENDO    INCISION AND DRAINAGE (I&D), LABIAL N/A 4/17/2016    Performed by Harmony Fernandez MD at [facility] OR    INCISION AND DRAINAGE (I&D), LABIAL (ADD ON) Left 4/18/2016    Performed by Teressa Maddox MD at [facility] OR    INCISION AND DRAINAGE OF WOUND Left 2016    VULVAR ABCESS WITH NECROSIS    Insertion, Catheter, Central Venous, Hemodialysis N/A 3/28/2019    [facility]formed by [facility] CATH LAB    Insertion, Catheter, Central Venous, Hemodialysis N/A 3/18/2019    [facility]formed by [facility]eks [facility] at [facility] CATH LAB    INSERTION, CATHETER, TUNNELED ABORTED Left 3/14/2019    Performed by Servando Solis MD at [facility] OR    INSERTION, GRAFT, ARTERIOVENOUS, RIGHT ARM Right 3/22/2019    Performed by BESSIE Wynn III, MD at [facility] OR 2ND FLR    INSERTION, INTRA-AORTIC BALLOON PUMP  4/15/2019    Performed by Roland [facility]ier [facility] at [facility] CATH LAB    Left heart cath Left 4/15/2019    Performed by Roland [facility]ier MD at [facility] CATH LAB    ORIF, HIP Left 9/13/2018    Performed by Tevin Grullon MD at [facility] OR    PERITONEAL CATHETER INSERTION      PERMCATH REWIRE- TUNNELED CATH REWIRE Left 11/13/2017    Performed by Baldev Munroe MD at [facility] CATH LAB    PERMCATH REWIRE- TUNNELED CATH REWIRE N/A 10/5/2017    Performed by Baldev Munroe MD at [facility] CATH LAB    REMOVAL, CATHETER, DIALYSIS, PERITONEAL N/A 3/14/2019    Performed by [facility]ando Solis MD at [facility] OR    REPLACEMENT, CATHETER, DIALYSIS, OVER GUIDEWIRE, USING EXISTING VENOUS ACCESS Left 5/24/2019    Performed by Baldev Munroe MD at [facility] CATH LAB    UMBILICAL HERNIA REPAIR       Venogram, possible intervention Right 3/20/2019    Performed by BESSIE Wynn III, MD at [facility] CATH LAB     Family History   Problem Relation Age of Onset    Cancer Maternal Grandmother     Cancer Paternal Grandfather     Diabetes Maternal Aunt     Diabetes Paternal Aunt     Kidney disease Neg Hx     Heart disease Neg Hx     Ovarian cancer Neg Hx     Breast cancer Neg Hx      Social History     Tobacco Use    Smoking status: Never Smoker    Smokeless tobacco: Never Used   Substance Use Topics    Alcohol use: No     Comment: Pt reports some social use of about 1-2 drinks about every six months.    Drug use: No     Review of Systems   Constitutional: Negative for fever.   HENT: Negative for sore throat.    Respiratory: Negative for shortness of breath.    Cardiovascular: Negative for chest pain.        Permacath problem.    Gastrointestinal: Negative for nausea.   Genitourinary: Negative for dysuria.   Musculoskeletal: Negative for back pain.   Skin: Negative for rash.   Neurological: Negative for weakness.   Hematological: Does not bruise/bleed easily.       Physical Exam     Initial Vitals [06/20/19 1232]   BP Pulse Resp Temp SpO2   116/71 88 18 98 °F (36.7 °C) 100 %      MAP       --         Physical Exam    Vitals reviewed.  Constitutional: She appears well-developed and well-nourished. She is not diaphoretic. No distress.   HENT:   Head: Normocephalic and atraumatic.   Nose: Nose normal.   Eyes: Conjunctivae and EOM are normal.   Neck: Normal range of motion.   Cardiovascular: Normal rate, regular rhythm and normal heart sounds. Exam reveals no friction rub.    No murmur heard.  Pulses:       Dorsalis pedis pulses are 2+ on the right side, and 2+ on the left side.   Pulmonary/Chest: Breath sounds normal. No respiratory distress. She has no wheezes. She has no rales.   Abdominal: Soft. Bowel sounds are normal. She exhibits no distension. There is no tenderness. There is no rebound.   Obese abdomen.    Musculoskeletal: Normal range of motion.   Neurological: She is alert and oriented to person, place, and time. She has normal strength. No sensory deficit.   Skin: Skin is warm and dry. No abrasion, no bruising, no burn and no lesion noted. No erythema. No pallor.   Cath noted to L groin.   Psychiatric: She has a normal mood and affect. Her behavior is normal. Judgment and thought content normal.         ED Course   Procedures  Labs Reviewed   COMPREHENSIVE METABOLIC PANEL - Abnormal; Notable for the following components:       Result Value    CO2 21 (*)     Glucose 140 (*)     BUN, Bld 30 (*)     Creatinine 7.4 (*)     Total Protein 8.5 (*)     Albumin 3.0 (*)     Anion Gap 18 (*)     eGFR if  6.8 (*)     eGFR if non  5.9 (*)     All other components within normal limits   CBC W/ AUTO DIFFERENTIAL - Abnormal; Notable for the following components:    RBC 2.80 (*)     Hemoglobin 8.8 (*)     Hematocrit 31.0 (*)     Mean Corpuscular Volume 111 (*)     Mean Corpuscular Hemoglobin 31.4 (*)     Mean Corpuscular Hemoglobin Conc 28.4 (*)     RDW 19.6 (*)     Platelets 413 (*)     All other components within normal limits   PHOSPHORUS - Abnormal; Notable for the following components:    Phosphorus 4.8 (*)     All other components within normal limits   MAGNESIUM          Imaging Results    None          Medical Decision Making:   History:   Old Medical Records: I decided to obtain old medical records.  Initial Assessment:   Patient is a 48-year-old female with history of end-stage renal disease who presents the ED with vascular access catheter problem.  Differential Diagnosis:   Includes but is not limited to clotted catheter, malposition. No signs of infection.  Distal pulses intact.  Clinical Tests:   Lab Tests: Ordered and Reviewed  ED Management:  Patient did not receive dialysis today.  Will obtain labs in touch base with Nephrology.    CBC with no leukocytosis.  H/H at 8.8/31.    CMP  with no significant electrolyte abnormalities.    Hyperphosphatemia at 4.8.  Normal magnesium.    Note indication for emergent dialysis at this time.  Case discussed with Nephrology who recommends oberservation.  They will try to send dialysis nurse this evening to assess the cath.  Other:   I have discussed this case with another health care provider.    I have reviewed patient's chart and discussed this case with my supervising MD.     Flor Zayas PA-C  Emergent Department  [facility]  Spectralink #[id number] or #[phone]                        Clinical Impression:       ICD-10-CM ICD-9-CM   1. Problem with vascular access Z78.9 V49.9         Disposition:   Disposition: Placed in Observation                        Flor Zayas PA-C  06/20/19 1921

## 2019-06-21 NOTE — PROGRESS NOTES
Hemodialysis    bedside HD treatment in room 647A. Patient is awake, alert oriented.  ACCESS: left femoral catheter, indwelled Alteplase aspirated, sluggish venous port, with good flow on arterial port.    HD started

## 2019-06-21 NOTE — HPI
Patient is a 47yo AA female with a PMHx of ESRD-HD TTS, HTN, CAD, CHF (EF 25%) being admitted to observation for catheter malfunction. Patient states yesterday they tried to declot her graft but had issues achieving flow. Again today while at the center, they wanted to attempt declotting but they did not have any more Activase at the clinic. She reports associated LE edema and belly distension. She denies chest pain, SOB, nausea, and weakness. 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 She had to have a tunneled dialysis catheter placed to the left femoral artery on 3/28 with a rewiring on 5/23.    In the ED, vitals stable. Intake labs without acute abnormality.

## 2019-06-21 NOTE — PROGRESS NOTES
Phone call: I called to discuss PD with her and the high risk of this case due to tense ascites.  I left a message.

## 2019-06-21 NOTE — PLAN OF CARE
Problem: Adult Inpatient Plan of Care  Goal: Plan of Care Review  Plan of care reviewed with patient. Bed low and locked with all patient items within reach. Cath to left femoral. Dialysis to bedside to dialyze patient. Patient dialyzed t, th, and sat. ble swelling. Will continue to monitor and continue plan of care.

## 2019-06-21 NOTE — PROGRESS NOTES
AVS d/c instructions given to patient, voices understanding. PIV d/jimena cath with tip intact, gauze dressing applied. Waiting on sister to arrive for d/c.

## 2019-06-21 NOTE — H&P
[facility] Hospital Medicine  History & Physical    Patient Name: Jenni Todd  MRN: [id number]  Admission Date: 6/20/2019  Attending Physician: Fiordaliza Llamas MD   Primary Care Provider: Michael Tan Iii, MD    [facility] Medicine Team: [facility] MED F Zulema Lamas PA-C     Patient information was obtained from patient, past medical records and ER records.     Subjective:     Principal Problem:Problem with vascular access    Chief Complaint:   Chief Complaint   Patient presents with    Vascular Access Problem     Went to dialysis and was unable to get dialysis        HPI: Patient is a 49yo AA female with a PMHx of ESRD-HD TTS, HTN, CAD, CHF (EF 25%) being admitted to observation for catheter malfunction. Patient states yesterday they tried to declot her graft but had issues achieving flow. Again today while at the center, they wanted to attempt declotting but they did not have any more Activase at the clinic. She reports associated LE edema and belly distension. She denies chest pain, SOB, nausea, and weakness. 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 She had to have a tunneled dialysis catheter placed to the left femoral artery on 3/28 with a rewiring on 5/23.    In the ED, vitals stable. Intake labs without acute abnormality.    Past Medical History:   Diagnosis Date    Abnormal finding on Pap smear, HPV DNA positive 2014    Anemia associated with chronic renal failure     on Epogen    Blood type B+     Bulging discs - symptomatic      CAD (coronary artery disease)     Cardiomyopathy 3/13/2019    Diabetes mellitus, type 2     ESRD (end stage renal disease) 04/20/2004    FSGS (focal segmental glomerulosclerosis)     with collapsing    Hyperlipidemia     Hypertension 2004    Neuropathy     NSTEMI (non-ST elevated myocardial infarction) 3/29/2019    Obesity     Secondary hyperparathyroidism, renal      TIA (transient ischemic attack)     Uterine fibroid     small uterine        Past Surgical History:   Procedure Laterality Date    ANGIOGRAM, CORONARY ARTERY N/A 4/15/2019    Performed by Roland [facility]ier MD at [facility] CATH LAB    CARDIAC CATHETERIZATION      PCI x 2     SECTION, CLASSIC      COLONOSCOPY N/A 2018    Performed by Rodrigue Russell MD at [facility] ENDO (2ND FLR)    DEBRIDEMENT-WOUND Left 2016    Performed by [facility]a [facility]ox MD at [facility] OR    DIALYSIS FISTULA CREATION      multiple fistulas and grafts before PD     EGD (ESOPHAGOGASTRODUODENOSCOPY) N/A 3/14/2019    Performed by Adolph [facility]ila MD at [facility] ENDO    EGD (ESOPHAGOGASTRODUODENOSCOPY) N/A 3/13/2019    Performed by Adolph Davila MD at [facility] ENDO    INCISION AND DRAINAGE (I&D), LABIAL N/A 2016    Performed by Harmony Fernandez MD at [facility] OR    INCISION AND DRAINAGE (I&D), LABIAL (ADD ON) Left 2016    Performed by Teressa Maddox MD at [facility] OR    INCISION AND DRAINAGE OF WOUND Left     VULVAR ABCESS WITH NECROSIS    Insertion, Catheter, Central Venous, Hemodialysis N/A 3/28/2019    Performed by Kimo Weeks MD at [facility] CATH LAB    Insertion, Catheter, Central Venous, Hemodialysis N/A 3/18/2019    Performed by Kimo Weeks MD at [facility] CATH LAB    INSERTION, CATHETER, TUNNELED ABORTED Left 3/14/2019    Performed by Servando Solis MD at [facility] OR    INSERTION, GRAFT, ARTERIOVENOUS, RIGHT ARM Right 3/22/2019    Performed by BESSIE Wynn III, MD at [facility] OR 2ND FLR    INSERTION, INTRA-AORTIC BALLOON PUMP  4/15/2019    Performed by Roland Napier MD at [facility] CATH LAB    Left heart cath Left 4/15/2019    Performed by Roland Napier MD at [facility] CATH LAB    ORIF, HIP Left 2018    Performed by Tevin Grullon MD at [facility] OR    PERITONEAL CATHETER INSERTION      PERMCATH REWIRE- TUNNELED CATH REWIRE Left 2017    Performed by Baldev Munroe MD at [facility] CATH LAB     PERMCATH REWIRE- TUNNELED CATH REWIRE N/A 10/5/2017    Performed by Baldev Munroe MD at [facility] CATH LAB    REMOVAL, CATHETER, DIALYSIS, PERITONEAL N/A 3/14/2019    Performed by Servando Solis MD at [facility] OR    REPLACEMENT, CATHETER, DIALYSIS, OVER GUIDEWIRE, USING EXISTING VENOUS ACCESS Left 5/24/2019    Performed by Baldev Munroe MD at [facility] CATH LAB    UMBILICAL HERNIA REPAIR      Venogram, possible intervention Right 3/20/2019    Performed by BESSIE Wynn III, MD at [facility] CATH LAB       Review of patient's allergies indicates:   Allergen Reactions    Clindamycin Diarrhea    Flagyl [metronidazole hcl]     Metronidazole        No current facility-administered medications on file prior to encounter.      Current Outpatient Medications on File Prior to Encounter   Medication Sig    aspirin (ECOTRIN) 81 MG EC tablet Take 1 tablet (81 mg total) by mouth once daily.    atorvastatin (LIPITOR) 40 MG tablet Take 40 mg by mouth every evening.     clopidogrel (PLAVIX) 75 mg tablet Take 1 tablet (75 mg total) by mouth once daily.    famotidine (PEPCID) 20 MG tablet Take 1 tablet (20 mg total) by mouth 2 (two) times daily.    ferrous sulfate 325 (65 FE) MG EC tablet Take 1 tablet (325 mg total) by mouth 2 (two) times daily. (Patient taking differently: Take 325 mg by mouth once daily. )    gabapentin (NEURONTIN) 300 MG capsule Take 1 capsule (300 mg total) by mouth 2 (two) times daily.    insulin aspart U-100 (NOVOLOG) 100 unit/mL (3 mL) InPn pen Inject 0-5 Units into the skin before meals and at bedtime as needed (Hyperglycemia).    magnesium oxide (MAG-OX) 400 mg (241.3 mg magnesium) tablet Take 1 tablet (400 mg total) by mouth 3 (three) times daily. (Patient taking differently: Take 400 mg by mouth once daily. )    midodrine (PROAMATINE) 10 MG tablet Take 1 tablet (10 mg total) by mouth 3 (three) times daily with meals.    ondansetron (ZOFRAN-ODT) 4 MG TbDL Take 2 tablets (8 mg total) by mouth  "every 8 (eight) hours as needed.    ONETOUCH VERIO Strp USE 1 STRIP ONCE DAILY IN VITRO    pen needle, diabetic 31 gauge x 3/16" Ndle 1 each by Misc.(Non-Drug; Combo Route) route 4 (four) times daily before meals and nightly.    sevelamer carbonate (RENVELA) 800 mg Tab Take 1 tablet (800 mg total) by mouth 3 (three) times daily with meals.    vitamin renal formula, B-complex-vitamin c-folic acid, (B COMPLEX-C-FOLIC ACID) 1 mg Cap Take 1 capsule by mouth once daily. 1 Capsule Oral Every day    [DISCONTINUED] calcitRIOL (ROCALTROL) 0.25 MCG Cap Take 0.5 mcg by mouth once daily.     [DISCONTINUED] calcium carbonate (CALCI-CHEW) 500 mg calcium (1,250 mg) chewable tablet Take 500 mg by mouth.    [DISCONTINUED] cetirizine (ZYRTEC) 5 MG tablet Take 1 tablet (5 mg total) by mouth once daily.    [DISCONTINUED] cinacalcet (SENSIPAR) 90 MG Tab Take 90 mg by mouth once daily.     Family History     Problem Relation (Age of Onset)    Cancer Maternal Grandmother, Paternal Grandfather    Diabetes Maternal Aunt, Paternal Aunt        Tobacco Use    Smoking status: Never Smoker    Smokeless tobacco: Never Used   Substance and Sexual Activity    Alcohol use: No     Comment: Pt reports some social use of about 1-2 drinks about every six months.    Drug use: No    Sexual activity: Not Currently     Partners: Male     Birth control/protection: None     Review of Systems   Constitutional: Negative for activity change, chills and fever.   HENT: Negative for trouble swallowing.    Eyes: Negative for photophobia and visual disturbance.   Respiratory: Negative for cough, chest tightness, shortness of breath and wheezing.    Cardiovascular: Positive for leg swelling. Negative for chest pain and palpitations.   Gastrointestinal: Positive for abdominal distention and diarrhea. Negative for abdominal pain, constipation, nausea and vomiting.   Musculoskeletal: Positive for back pain and gait problem (rollator). Negative for neck pain " and neck stiffness.   Skin: Positive for wound. Negative for rash.   Neurological: Negative for dizziness, facial asymmetry, weakness and light-headedness.   Psychiatric/Behavioral: Negative for agitation and confusion. The patient is not nervous/anxious.      Objective:     Vital Signs (Most Recent):  Temp: 98.1 °F (36.7 °C) (06/20/19 1645)  Pulse: 81 (06/20/19 1914)  Resp: 16 (06/20/19 1830)  BP: 125/85 (06/20/19 1901)  SpO2: 100 % (06/20/19 1914) Vital Signs (24h Range):  Temp:  [98 °F (36.7 °C)-98.1 °F (36.7 °C)] 98.1 °F (36.7 °C)  Pulse:  [75-97] 81  Resp:  [16-18] 16  SpO2:  [95 %-100 %] 100 %  BP: (106-159)/() 125/85     Weight: 96.7 kg (213 lb 3 oz)  Body mass index is 32.9 kg/m².    Physical Exam   Constitutional: She is oriented to person, place, and time. She appears well-developed and well-nourished. No distress.   HENT:   Head: Normocephalic and atraumatic.   Mouth/Throat: No oropharyngeal exudate.   Eyes: Pupils are equal, round, and reactive to light. Conjunctivae and EOM are normal.   Neck: Normal range of motion. Neck supple.   Cardiovascular: Normal rate, regular rhythm, normal heart sounds and intact distal pulses.   Pulmonary/Chest: Effort normal and breath sounds normal. No respiratory distress. She has no wheezes.   Abdominal: Soft. Bowel sounds are normal. She exhibits distension. There is no tenderness. There is no guarding.   Musculoskeletal: Normal range of motion. She exhibits edema (2+ BLE). She exhibits no tenderness.   Lymphadenopathy:     She has no cervical adenopathy.   Neurological: She is alert and oriented to person, place, and time. No cranial nerve deficit or sensory deficit. Coordination normal.   Skin: Skin is warm and dry. Capillary refill takes less than 2 seconds. No rash noted.   Psychiatric: She has a normal mood and affect. Her behavior is normal. Judgment and thought content normal.   Nursing note and vitals reviewed.        CRANIAL NERVES     CN III, IV, VI    Pupils are equal, round, and reactive to light.  Extraocular motions are normal.        Significant Labs:   BMP:   Recent Labs   Lab 06/20/19  1435   *      K 4.3      CO2 21*   BUN 30*   CREATININE 7.4*   CALCIUM 9.3   MG 2.1     CBC:   Recent Labs   Lab 06/20/19  1435   WBC 7.59   HGB 8.8*   HCT 31.0*   *       Significant Imaging: I have reviewed all pertinent imaging results/findings within the past 24 hours.    Assessment/Plan:     * Problem with vascular access  ESRD on HD    Patient presents with clotted permacath to groin for 2 days. Unable to access. Patient stable, no need for emergent HD now.  - Nephrology consulted for cathflow  - may need replacement line, history of line access issues  - continue seveelamer carbonate 800mg TIDWM  - renal diet  - daily renal panel  - strict Is/Os, daily weights    Chronic combined systolic and diastolic heart failure  - strict Is/Os, daily weights  - appears compensated  - not on ACE/ARB    CAD (coronary artery disease)  - continue asa 81 daily  - continue clopidogrel 75mg daily  - continue atorvastatin 40mg daily    VTE Risk Mitigation (From admission, onward)        Ordered     IP VTE HIGH RISK PATIENT  Once      06/20/19 2205     Place MEERA hose  Until discontinued      06/20/19 2205     Place sequential compression device  Until discontinued      06/20/19 2205             Zulema Lamas PA-C  Department of Hospital Medicine   [facility]

## 2019-06-21 NOTE — PROGRESS NOTES
3 hour Bedside HD completed. 2000ml fluid removed. Patients BP became low at end of tx. After rinse back, BP estela. Pt tolerated tx. CVC flushed with NS and 5000:1 heparin instilled in each port as stated on cath. Ports capped and tapped.

## 2019-06-21 NOTE — PLAN OF CARE
SW was unsuccessful with Assessment due to d/c.    Mikey, [facility]  [phone]

## 2019-06-21 NOTE — ASSESSMENT & PLAN NOTE
ESRD on HD    Patient presents with clotted permacath to groin for 2 days. Unable to access. Patient stable, no need for emergent HD now.  - Nephrology consulted for cathflow  - may need replacement line, history of line access issues  - continue seveelamer carbonate 800mg TIDWM  - renal diet  - daily renal panel  - strict Is/Os, daily weights

## 2019-06-21 NOTE — PLAN OF CARE
Patient discharged home.  The patient shaffer snot have any home needs.  No requested appointments to be made.  Family provided transportation home.    Future Appointments   Date Time Provider Department Center   6/24/2019  9:00 AM Michael Tan III, MD [facility]   7/10/2019  7:30 AM CARDIAC, PET IMAGING [facility] CARDPET [facility]   7/10/2019  9:00 AM Garrett Araujo MD [facility] HEARTTX [facility]   7/10/2019 10:20 AM Ayan Christianson MD [facility] HEPAT [facility]   7/10/2019  2:00 PM Gurvinder Watt MD [facility]        06/21/19 1417   Final Note   Assessment Type Final Discharge Note   Anticipated Discharge Disposition Home   Hospital Follow Up  Appt(s) scheduled? No   Discharge plans and expectations educations in teach back method with documentation complete? No

## 2019-06-21 NOTE — SUBJECTIVE & OBJECTIVE
Past Medical History:   Diagnosis Date    Abnormal finding on Pap smear, HPV DNA positive     Anemia associated with chronic renal failure     on Epogen    Blood type B+     Bulging discs - symptomatic      CAD (coronary artery disease)     Cardiomyopathy 3/13/2019    Diabetes mellitus, type 2     ESRD (end stage renal disease) 2004    FSGS (focal segmental glomerulosclerosis)     with collapsing    Hyperlipidemia     Hypertension     Neuropathy     NSTEMI (non-ST elevated myocardial infarction) 3/29/2019    Obesity     Secondary hyperparathyroidism, renal     TIA (transient ischemic attack)     Uterine fibroid     small uterine        Past Surgical History:   Procedure Laterality Date    ANGIOGRAM, CORONARY ARTERY N/A 4/15/2019    Performed by Roland Napier MD at [facility] CATH LAB    CARDIAC CATHETERIZATION      PCI x 2     SECTION, CLASSIC      COLONOSCOPY N/A 2018    Performed by Rodrigue Russell MD at [facility] ENDO (2ND FLR)    DEBRIDEMENT-WOUND Left 2016    Performed by Teressa Maddox MD at [facility] OR    DIALYSIS FISTULA CREATION      multiple fistulas and grafts before PD     EGD (ESOPHAGOGASTRODUODENOSCOPY) N/A 3/14/2019    Performed by Adolph Davila MD at [facility] ENDO    EGD (ESOPHAGOGASTRODUODENOSCOPY) N/A 3/13/2019    Performed by Adolph Davila MD at [facility] ENDO    INCISION AND DRAINAGE (I&D), LABIAL N/A 2016    Performed by Harmony Fernandez MD at [facility] OR    INCISION AND DRAINAGE (I&D), LABIAL (ADD ON) Left 2016    Performed by Teressa Maddox MD at [facility] OR    INCISION AND DRAINAGE OF WOUND Left     VULVAR ABCESS WITH NECROSIS    Insertion, Catheter, Central Venous, Hemodialysis N/A 3/28/2019    Performed by iKmo Weeks MD at [facility] CATH LAB    Insertion, Catheter, Central Venous, Hemodialysis N/A 3/18/2019    Performed by Kimo Weeks MD at [facility] CATH LAB    INSERTION, CATHETER, TUNNELED ABORTED Left 3/14/2019     [facility]ando Solis MD at [facility] OR    INSERTION, GRAFT, ARTERIOVENOUS, RIGHT ARM Right 3/22/2019    Performed by BESSIE Wynn III, MD at [facility] OR 2ND FLR    INSERTION, INTRA-AORTIC BALLOON PUMP  4/15/2019    Performed by Roland Napier MD at [facility] CATH LAB    Left heart cath Left 4/15/2019    Performed by Roland Napier MD at [facility] CATH LAB    ORIF, HIP Left 9/13/2018    Performed by Tevin Grullon MD at [facility] OR    PERITONEAL CATHETER INSERTION      PERMCATH REWIRE- TUNNELED CATH REWIRE Left 11/13/2017    Performed by Baldev Munroe MD at [facility] CATH LAB    PERMCATH REWIRE- TUNNELED CATH REWIRE N/A 10/5/2017    Performed by Baldev Munroe MD at [facility] CATH LAB    REMOVAL, CATHETER, DIALYSIS, PERITONEAL N/A 3/14/2019    Performed by Servando Solis MD at [facility] OR    REPLACEMENT, CATHETER, DIALYSIS, OVER GUIDEWIRE, USING EXISTING VENOUS ACCESS Left 5/24/2019    Performed by Baldev Munroe MD at [facility] CATH LAB    UMBILICAL HERNIA REPAIR      Venogram, possible intervention Right 3/20/2019    Performed by BESSIE Wynn III, MD at [facility] CATH LAB       Review of patient's allergies indicates:   Allergen Reactions    Clindamycin Diarrhea    Flagyl [metronidazole hcl]     Metronidazole        No current facility-administered medications on file prior to encounter.      Current Outpatient Medications on File Prior to Encounter   Medication Sig    aspirin (ECOTRIN) 81 MG EC tablet Take 1 tablet (81 mg total) by mouth once daily.    atorvastatin (LIPITOR) 40 MG tablet Take 40 mg by mouth every evening.     clopidogrel (PLAVIX) 75 mg tablet Take 1 tablet (75 mg total) by mouth once daily.    famotidine (PEPCID) 20 MG tablet Take 1 tablet (20 mg total) by mouth 2 (two) times daily.    ferrous sulfate 325 (65 FE) MG EC tablet Take 1 tablet (325 mg total) by mouth 2 (two) times daily. (Patient taking differently: Take 325 mg by mouth once daily. )    gabapentin (NEURONTIN) 300 MG  "capsule Take 1 capsule (300 mg total) by mouth 2 (two) times daily.    insulin aspart U-100 (NOVOLOG) 100 unit/mL (3 mL) InPn pen Inject 0-5 Units into the skin before meals and at bedtime as needed (Hyperglycemia).    magnesium oxide (MAG-OX) 400 mg (241.3 mg magnesium) tablet Take 1 tablet (400 mg total) by mouth 3 (three) times daily. (Patient taking differently: Take 400 mg by mouth once daily. )    midodrine (PROAMATINE) 10 MG tablet Take 1 tablet (10 mg total) by mouth 3 (three) times daily with meals.    ondansetron (ZOFRAN-ODT) 4 MG TbDL Take 2 tablets (8 mg total) by mouth every 8 (eight) hours as needed.    ONETOUCH VERIO Strp USE 1 STRIP ONCE DAILY IN VITRO    pen needle, diabetic 31 gauge x 3/16" Ndle 1 each by Misc.(Non-Drug; Combo Route) route 4 (four) times daily before meals and nightly.    sevelamer carbonate (RENVELA) 800 mg Tab Take 1 tablet (800 mg total) by mouth 3 (three) times daily with meals.    vitamin renal formula, B-complex-vitamin c-folic acid, (B COMPLEX-C-FOLIC ACID) 1 mg Cap Take 1 capsule by mouth once daily. 1 Capsule Oral Every day    [DISCONTINUED] calcitRIOL (ROCALTROL) 0.25 MCG Cap Take 0.5 mcg by mouth once daily.     [DISCONTINUED] calcium carbonate (CALCI-CHEW) 500 mg calcium (1,250 mg) chewable tablet Take 500 mg by mouth.    [DISCONTINUED] cetirizine (ZYRTEC) 5 MG tablet Take 1 tablet (5 mg total) by mouth once daily.    [DISCONTINUED] cinacalcet (SENSIPAR) 90 MG Tab Take 90 mg by mouth once daily.     Family History     Problem Relation (Age of Onset)    Cancer Maternal Grandmother, Paternal Grandfather    Diabetes Maternal Aunt, Paternal Aunt        Tobacco Use    Smoking status: Never Smoker    Smokeless tobacco: Never Used   Substance and Sexual Activity    Alcohol use: No     Comment: Pt reports some social use of about 1-2 drinks about every six months.    Drug use: No    Sexual activity: Not Currently     Partners: Male     Birth control/protection: " None     Review of Systems   Constitutional: Negative for activity change, chills and fever.   HENT: Negative for trouble swallowing.    Eyes: Negative for photophobia and visual disturbance.   Respiratory: Negative for cough, chest tightness, shortness of breath and wheezing.    Cardiovascular: Positive for leg swelling. Negative for chest pain and palpitations.   Gastrointestinal: Positive for abdominal distention and diarrhea. Negative for abdominal pain, constipation, nausea and vomiting.   Musculoskeletal: Positive for back pain and gait problem (rollator). Negative for neck pain and neck stiffness.   Skin: Positive for wound. Negative for rash.   Neurological: Negative for dizziness, facial asymmetry, weakness and light-headedness.   Psychiatric/Behavioral: Negative for agitation and confusion. The patient is not nervous/anxious.      Objective:     Vital Signs (Most Recent):  Temp: 98.1 °F (36.7 °C) (06/20/19 1645)  Pulse: 81 (06/20/19 1914)  Resp: 16 (06/20/19 1830)  BP: 125/85 (06/20/19 1901)  SpO2: 100 % (06/20/19 1914) Vital Signs (24h Range):  Temp:  [98 °F (36.7 °C)-98.1 °F (36.7 °C)] 98.1 °F (36.7 °C)  Pulse:  [75-97] 81  Resp:  [16-18] 16  SpO2:  [95 %-100 %] 100 %  BP: (106-159)/() 125/85     Weight: 96.7 kg (213 lb 3 oz)  Body mass index is 32.9 kg/m².    Physical Exam   Constitutional: She is oriented to person, place, and time. She appears well-developed and well-nourished. No distress.   HENT:   Head: Normocephalic and atraumatic.   Mouth/Throat: No oropharyngeal exudate.   Eyes: Pupils are equal, round, and reactive to light. Conjunctivae and EOM are normal.   Neck: Normal range of motion. Neck supple.   Cardiovascular: Normal rate, regular rhythm, normal heart sounds and intact distal pulses.   Pulmonary/Chest: Effort normal and breath sounds normal. No respiratory distress. She has no wheezes.   Abdominal: Soft. Bowel sounds are normal. She exhibits distension. There is no tenderness.  There is no guarding.   Musculoskeletal: Normal range of motion. She exhibits edema (2+ BLE). She exhibits no tenderness.   Lymphadenopathy:     She has no cervical adenopathy.   Neurological: She is alert and oriented to person, place, and time. No cranial nerve deficit or sensory deficit. Coordination normal.   Skin: Skin is warm and dry. Capillary refill takes less than 2 seconds. No rash noted.   Psychiatric: She has a normal mood and affect. Her behavior is normal. Judgment and thought content normal.   Nursing note and vitals reviewed.        CRANIAL NERVES     CN III, IV, VI   Pupils are equal, round, and reactive to light.  Extraocular motions are normal.        Significant Labs:   BMP:   Recent Labs   Lab 06/20/19  1435   *      K 4.3      CO2 21*   BUN 30*   CREATININE 7.4*   CALCIUM 9.3   MG 2.1     CBC:   Recent Labs   Lab 06/20/19  1435   WBC 7.59   HGB 8.8*   HCT 31.0*   *       Significant Imaging: I have reviewed all pertinent imaging results/findings within the past 24 hours.

## 2019-06-23 NOTE — HOSPITAL COURSE
Patient admitted to hospital medicine for cathflo. Patient unable to obtain cathflo at [facility], as they only supply a certain amount. Patient dialyze inpatient with no problems. No need for new catheter. Discharged in good condition.

## 2019-06-23 NOTE — DISCHARGE SUMMARY
[facility] Hospital Medicine  Discharge Summary      Patient Name: Jenni Todd  MRN: [id number]  Admission Date: 6/20/2019  Hospital Length of Stay: 0 days  Discharge Date and Time: 6/21/2019 12:19 PM  Attending Physician: Xin att. providers found   Discharging Provider: Chastity Friend NP  Primary Care Provider: Michael Tan Iii, MD  [facility] Hospital Medicine Team: [facility] HOSP MED F Chastity Friend NP    HPI:   Patient is a 47yo AA female with a PMHx of ESRD-HD TTS, HTN, CAD, CHF (EF 25%) being admitted to observation for catheter malfunction. Patient states yesterday they tried to declot her graft but had issues achieving flow. Again today while at the center, they wanted to attempt declotting but they did not have any more Activase at the clinic. She reports associated LE edema and belly distension. She denies chest pain, SOB, nausea, and weakness. 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 She had to have a tunneled dialysis catheter placed to the left femoral artery on 3/28 with a rewiring on 5/23.    In the ED, vitals stable. Intake labs without acute abnormality.    * No surgery found *      Hospital Course:   Patient admitted to hospital medicine for cathflo. Patient unable to obtain cathflo at [facility], as they only supply a certain amount. Patient dialyze inpatient with no problems. No need for new catheter. Discharged in good condition.     Consults:     * Problem with vascular access  ESRD on HD    Patient presents with clotted permacath to groin for 2 days. Unable to access. Patient stable, no need for emergent HD now.  - Nephrology consulted for cathflow  - no replaced line indicated as patient has clots in both IJs and her catheter is working properly in the hospital with cathflow  - continue seveelamer carbonate 800mg TIDWM  - renal diet  - daily renal panel  - strict Is/Os, daily weights    Chronic combined systolic  and diastolic heart failure  - strict Is/Os, daily weights  - appears compensated  - not on ACE/ARB    CAD (coronary artery disease)  - continue asa 81 daily  - continue clopidogrel 75mg daily  - continue atorvastatin 40mg daily      Final Active Diagnoses:    Diagnosis Date Noted POA    PRINCIPAL PROBLEM:  Problem with vascular access [Z78.9] 06/20/2019 Yes    Chronic combined systolic and diastolic heart failure [I50.42] 03/18/2019 Yes    ESRD (end stage renal disease) on dialysis [N18.6, Z99.2] 09/12/2018 Not Applicable    CAD (coronary artery disease) [I25.10]  Yes      Problems Resolved During this Admission:       Discharged Condition: good    Disposition: Home or Self Care    Follow Up:    Patient Instructions:      Notify your health care provider if you experience any of the following:  temperature >100.4     Notify your health care provider if you experience any of the following:  increased confusion or weakness     Notify your health care provider if you experience any of the following:  persistent dizziness, light-headedness, or visual disturbances     Notify your health care provider if you experience any of the following:  redness, tenderness, or signs of infection (pain, swelling, redness, odor or green/yellow discharge around incision site)     Activity as tolerated       Significant Diagnostic Studies: Labs: All labs within the past 24 hours have been reviewed    Pending Diagnostic Studies:     None         Medications:  Reconciled Home Medications:      Medication List      CHANGE how you take these medications    ferrous sulfate 325 (65 FE) MG EC tablet  Take 1 tablet (325 mg total) by mouth 2 (two) times daily.  What changed:  when to take this     magnesium oxide 400 mg (241.3 mg magnesium) tablet  Commonly known as:  MAG-OX  Take 1 tablet (400 mg total) by mouth 3 (three) times daily.  What changed:  when to take this        CONTINUE taking these medications    aspirin 81 MG EC  "tablet  Commonly known as:  ECOTRIN  Take 1 tablet (81 mg total) by mouth once daily.     atorvastatin 40 MG tablet  Commonly known as:  LIPITOR  Take 40 mg by mouth every evening.     clopidogrel 75 mg tablet  Commonly known as:  PLAVIX  Take 1 tablet (75 mg total) by mouth once daily.     famotidine 20 MG tablet  Commonly known as:  PEPCID  Take 1 tablet (20 mg total) by mouth 2 (two) times daily.     gabapentin 300 MG capsule  Commonly known as:  NEURONTIN  Take 1 capsule (300 mg total) by mouth 2 (two) times daily.     insulin aspart U-100 100 unit/mL (3 mL) Inpn pen  Commonly known as:  NovoLOG  Inject 0-5 Units into the skin before meals and at bedtime as needed (Hyperglycemia).     midodrine 10 MG tablet  Commonly known as:  PROAMATINE  Take 1 tablet (10 mg total) by mouth 3 (three) times daily with meals.     NEPHROCAPS 1 mg Cap  Generic drug:  vitamin renal formula (B-complex-vitamin c-folic acid)  Take 1 capsule by mouth once daily. 1 Capsule Oral Every day     ondansetron 4 MG Tbdl  Commonly known as:  ZOFRAN-ODT  Take 2 tablets (8 mg total) by mouth every 8 (eight) hours as needed.     ONETOUCH VERIO Strp  Generic drug:  blood sugar diagnostic  USE 1 STRIP ONCE DAILY IN VITRO     pen needle, diabetic 31 gauge x 3/16" Ndle  1 each by Misc.(Non-Drug; Combo Route) route 4 (four) times daily before meals and nightly.     sevelamer carbonate 800 mg Tab  Commonly known as:  RENVELA  Take 1 tablet (800 mg total) by mouth 3 (three) times daily with meals.        STOP taking these medications    CALCI-CHEW 500 mg calcium (1,250 mg) chewable tablet  Generic drug:  calcium carbonate     calcitRIOL 0.25 MCG Cap  Commonly known as:  ROCALTROL     cetirizine 5 MG tablet  Commonly known as:  ZYRTEC     cinacalcet 90 MG Tab  Commonly known as:  SENSIPAR            Indwelling Lines/Drains at time of discharge:   Lines/Drains/Airways     Central Venous Catheter Line                 Hemodialysis Catheter 05/24/19 1111 left " femoral 30 days          Drain                 Hemodialysis AV Fistula Left upper arm -- days         Hemodialysis AV Graft Right upper arm -- days                Time spent on the discharge of patient: >35 minutes  Patient was seen and examined on the date of discharge and determined to be suitable for discharge.         Chastity Friend NP  Department of Hospital Medicine  [facility]

## 2019-06-23 NOTE — ASSESSMENT & PLAN NOTE
ESRD on HD    Patient presents with clotted permacath to groin for 2 days. Unable to access. Patient stable, no need for emergent HD now.  - Nephrology consulted for cathflow  - no replaced line indicated as patient has clots in both IJs and her catheter is working properly in the hospital with cathflow  - continue seveelamer carbonate 800mg TIDWM  - renal diet  - daily renal panel  - strict Is/Os, daily weights

## 2019-06-25 PROBLEM — Z79.4 TYPE 2 DIABETES MELLITUS WITH HYPERGLYCEMIA, WITH LONG-TERM CURRENT USE OF INSULIN: Status: ACTIVE | Noted: 2019-01-01

## 2019-06-25 PROBLEM — I95.9 HYPOTENSION: Status: ACTIVE | Noted: 2019-01-01

## 2019-06-25 PROBLEM — E11.65 TYPE 2 DIABETES MELLITUS WITH HYPERGLYCEMIA, WITH LONG-TERM CURRENT USE OF INSULIN: Status: ACTIVE | Noted: 2019-01-01

## 2019-06-26 NOTE — TELEPHONE ENCOUNTER
Spoke with Meaghan on ObS and informed her the time of the cath. exchange is 1PM. Informed her pt. Needs to be NPO and informed her to inquire about a PT/INR since pt. Is on HeparinSQ, Plavix and Aspirin. Spoke with Andrew Ponce NP and he said he will write orders.

## 2019-07-06 PROBLEM — S82.101A CLOSED FRACTURE OF RIGHT PROXIMAL TIBIA: Status: ACTIVE | Noted: 2019-01-01

## 2019-07-06 PROBLEM — S82.141A CLOSED FRACTURE OF RIGHT TIBIAL PLATEAU: Status: ACTIVE | Noted: 2019-01-01

## 2019-07-06 NOTE — NURSING TRANSFER
Nursing Transfer Note      7/6/2019     Transfer To: ER CT --> 503    Transfer via stretcher    Transfer with cardiac monitoring    Transported by transport team    Medicines sent: n/a    Chart send with patient: Yes    Notified: family    Patient reassessed at: 7/6/19 @ 6306

## 2019-07-06 NOTE — NURSING
Pt admitted to room. Transferred to bed from stretcher. Tolerated well. Will continue to monitor. Call bell intact and in reach.

## 2019-07-06 NOTE — OP NOTE
DATE OF PROCEDURE: 07/06/2019     PREOPERATIVE DIAGNOSIS: right tibial plateau fracture     POSTOPERATIVE DIAGNOSIS: Right tibial plateau fracture     PROCEDURE PERFORMED:  1. Spanning external fixation of tibial plateau fracture, 20690.  2. Closed treatment of tibial plateau fracture with   manipulation under anesthesia, 66142.    SURGEON: Corwin Lopez M.D.    ASSISTANT: Saroj Tena MD - Resident - Assisting     * Kev Brewer MD - Resident - Assisting     * Sunil Mccarty MD - Resident - Assisting     * Kev Casas MD - Resident - Assisting    ANESTHESIA: General endotracheal.    ESTIMATED BLOOD LOSS: minimal  cc.    IV FLUIDS: 500 mL of crystalloid.    IMPLANTS: Synthes large external fixator.    INDICATIONS FOR PROCEDURE:   The patient is a 48 year-old female with a list of multiple comorbidities including end-stage renal disease on hemodialysis who sustained a closed right tibial plateau fracture The patient is now going to the Operating Room for spanning external fixation of fracture prior to definitive fixation to allow soft tissues to decrease in swelling. The risks, benefits, and alternatives of surgery were discussed with the patient prior to going to the Operating Room. Informed consent was obtained.    PROCEDURE IN DETAIL: The patient was identified and brought to the holding area and the site was marked. The patient was wheeled to the Operating Room and placed on the operating table in a supine position. General endotracheal anesthesia was induced and preoperative antibiotics were administered. The operative lower extremity was prepped and draped in a sterile fashion. A timeout was undertaken to confirm patient, site, surgery, surgeon, and administration of preoperative antibiotics. All agreed and we proceeded.     Using fluoroscopy we marked out the fracture site and the ideal location for the distal  pin on the femur and the proximal pin on the tibia.  An incision was  made over the anticipated side for the femoral pin we used a triple cannula to drill holes and place 2 half Shantz pins into the femur.  We then went to the site were remarked for our tibia pins.  We then placed 2 half Shantz pins into the tibia.  All pins were confirmed with fluoroscopy.  At this point, we built a medial and lateral bar constructs beginning laterally. We then tightened all the clamps and bars to hold this in position. Adequate reduction was verified on AP and lateral fluoroscopy and then all the clamps were definitively tightened.    Pin sites were then covered with Hibiclens soaked scrub sponges and overwrapped with Kerlix. The patient's compartments were soft at the end of the procedure. All instrument and sponge counts were reported correct at the end of the case and there were no complications. The patient was extubated, awakened, and taken to Recovery Room in stable condition.      PLAN FOR THE PATIENT: Patient will be started on anticoagulation and kept non weight bearing until definitive fixation.

## 2019-07-06 NOTE — ED NOTES
LOC: The patient is awake, alert, and oriented to place, time, situation. Affect is appropriate.  Speech is appropriate and clear.     APPEARANCE: Patient resting uncomfortably, reporting leg pain after falling this am,  in no acute distress.  Patient is clean and well groomed.    SKIN: The skin is warm and dry; color consistent with ethnicity.  Patient has normal skin turgor and moist mucus membranes.  Skin intact; breakdown or bruising noted.  Fistula right groin, old fistula right and left upper arms.   Right great toe with blister.     MUSCULOSKELETAL: Patient moving right extremity  with  difficulty.  Denies weakness.   Reports neuropathy to all extremities.     RESPIRATORY: Airway is open and patent. Respirations spontaneous, even, easy, and non-labored.  Patient has a normal effort and rate.  No accessory muscle use noted. Denies cough.     CARDIAC:  Normal rhythm and rate noted.  No peripheral edema noted. No complaints of chest pain.      ABDOMEN: Soft and non tender to palpation.  No distention noted.     NEUROLOGIC: Eyes open spontaneously.  Behavior appropriate to situation.  Follows commands; facial expression symmetrical.  Purposeful motor response noted.

## 2019-07-06 NOTE — ASSESSMENT & PLAN NOTE
-reports a clot in vascular access line  -Neprhology consulted and following  -will attempt to flush line tomorrow and do dialysis

## 2019-07-06 NOTE — ANESTHESIA POSTPROCEDURE EVALUATION
Anesthesia Post Evaluation    Patient: Jenni Todd    Procedure(s) Performed: Procedure(s) (LRB):  APPLICATION, EXTERNAL FIXATION DEVICE, LARGE, TIBIA- SYNTHES (Right)    Final Anesthesia Type: general  Patient location during evaluation: PACU  Patient participation: Yes- Able to Participate  Level of consciousness: awake and alert  Post-procedure vital signs: reviewed and stable  Pain management: adequate  Airway patency: patent  PONV status at discharge: No PONV  Anesthetic complications: no      Cardiovascular status: blood pressure returned to baseline and hemodynamically stable  Respiratory status: spontaneous ventilation and unassisted  Follow-up not needed.          Vitals Value Taken Time   /63 7/6/2019  4:31 PM   Temp 36.1 °C (97 °F) 7/6/2019  3:15 PM   Pulse 90 7/6/2019  4:41 PM   Resp 27 7/6/2019  4:41 PM   SpO2 100 % 7/6/2019  4:41 PM   Vitals shown include unvalidated device data.      No case tracking events are documented in the log.      Pain/Yuridia Score: Pain Rating Prior to Med Admin: 4 (7/6/2019  4:36 PM)  Yuridia Score: 8 (7/6/2019  3:15 PM)

## 2019-07-06 NOTE — CONSULTS
"[facility]  Nephrology  Consult Note    Patient Name: Jenni Todd  MRN: [id number]  Admission Date: 7/6/2019  Hospital Length of Stay: 0 days  Attending Provider: Taz Veras MD   Primary Care Physician: Michael Tan Iii, MD  Principal Problem:Closed fracture of right tibial plateau    Inpatient consult to Nephrology  Consult performed by: Andrew Ponce NP  Consult ordered by: Sunil Mccarty MD    Inpatient consult to Nephrology  Consult performed by: Andrew Ponce NP  Consult ordered by: Taz Veras MD  Reason for consult: ESRD MWF (Nocturnal)        Subjective:     HPI: Ms. Jenni Todd is a 47 yo AAF well known to our service.  She is an ESRD patient with multiple failed vascular access who presents to the ED on 07/06 after suffering a fall at home.  She recently had her tunneled dialysis catheter exchanged on 06/27 to her R femoral vein, and she reports that this catheter has been working fine for the past several treatments, but when she presented to dialysis on Friday, the nurse was unable to aspirate or flush the line and she was informed to present to the hospital for further management.  While on her way to the hospital today, she missed a step and "twisted" her knee and fell.  X-ray of there tibia/fibula revealing an acute fracture of the Tibial Plateau and orthopedics was consulted for further evaluation, planning on external fixation today.  Nephrology has been consulted for ESRD management while in-patient.    She has recently switched dialysis centers, [facility] with her last dialysis treatment being on Wednesday prior to presentation.  She has been followed closely with Dr. Alonso for possible re-placement of her PD catheter, but this has been on hold pending evaluation by cardiology/hepatology.  She is scheduled for surgery on the 25th for PD catheter placement @ [facility] and is scheduled to see hepatology on " the 10th of this Month.      Past Medical History:   Diagnosis Date    Abnormal finding on Pap smear, HPV DNA positive     Anemia associated with chronic renal failure     on Epogen    Blood type B+     Bulging discs - symptomatic      CAD (coronary artery disease)     Cardiomyopathy 3/13/2019    Diabetes mellitus, type 2     ESRD (end stage renal disease) 2004    FSGS (focal segmental glomerulosclerosis)     with collapsing    Hyperlipidemia     Hypertension     Neuropathy     NSTEMI (non-ST elevated myocardial infarction) 3/29/2019    Obesity     Secondary hyperparathyroidism, renal     TIA (transient ischemic attack)     Uterine fibroid     small uterine        Past Surgical History:   Procedure Laterality Date    ANGIOGRAM, CORONARY ARTERY N/A 4/15/2019    Performed by Roland [facility]ier MD at [facility] CATH LAB    CARDIAC CATHETERIZATION      PCI x 2     SECTION, CLASSIC      COLONOSCOPY N/A 2018    Performed by Rodrigue Russell MD at [facility] ENDO (2ND FLR)    DEBRIDEMENT-WOUND Left 2016    [facility]formed by [facility]    DIALYSIS FISTULA CREATION      multiple fistulas and grafts before PD     EGD (ESOPHAGOGASTRODUODENOSCOPY) N/A 3/14/2019    Performed by Adolph Davila MD at [facility] ENDO    EGD (ESOPHAGOGASTRODUODENOSCOPY) N/A 3/13/2019    Performed by Adolph Davila MD at [facility] ENDO    INCISION AND DRAINAGE (I&D), LABIAL N/A 2016    Performed by Harmony [facility]andez [facility]    INCISION AND DRAINAGE (I&D), LABIAL (ADD ON) Left 2016    Performed by [facility]ox [facility]    INCISION AND DRAINAGE OF WOUND Left     VULVAR ABCESS WITH NECROSIS    Insertion, Catheter, Central Venous, Hemodialysis N/A 3/28/2019    Performed by Kimo Weeks MD at [facility] CATH LAB    Insertion, Catheter, Central Venous, Hemodialysis N/A 3/18/2019    Performed by Kimo Weeks MD at [facility] CATH LAB    INSERTION, CATHETER,  TUNNELED ABORTED Left 3/14/2019    Performed by Servando Solis MD at [facility] OR    INSERTION, GRAFT, ARTERIOVENOUS, RIGHT ARM Right 3/22/2019    Performed by BESSIE Wynn III, MD at [facility] OR 2ND FLR    INSERTION, INTRA-AORTIC BALLOON PUMP  4/15/2019    Performed by [facility]ier MD at [facility] CATH LAB    Left heart cath Left 4/15/2019    Performed by Roland [facility]ier MD at [facility] CATH LAB    ORIF, HIP Left 9/13/2018    Performed by Tevin Grullon MD at [facility] OR    PERITONEAL CATHETER INSERTION      PERMCATH REWIRE- TUNNELED CATH REWIRE Left 11/13/2017    Performed by Baldev Munroe MD at [facility] CATH LAB    PERMCATH REWIRE- TUNNELED CATH REWIRE N/A 10/5/2017    Performed by Baldev Munroe MD at [facility] CATH LAB    REMOVAL, CATHETER, DIALYSIS, PERITONEAL N/A 3/14/2019    Performed by [facility]ando Solis MD at [facility] OR    REPLACEMENT, CATHETER, DIALYSIS, OVER GUIDEWIRE, USING EXISTING VENOUS ACCESS N/A 6/27/2019    Performed by Kimo [facility]eks MD at [facility] CATH LAB    REPLACEMENT, CATHETER, DIALYSIS, OVER GUIDEWIRE, USING EXISTING VENOUS ACCESS Left 5/24/2019    Performed by Baldev [facility]roe MD at [facility] CATH LAB    UMBILICAL HERNIA REPAIR      Venogram, possible intervention Right 3/20/2019    Performed by BESSIE Wynn III, MD at [facility] CATH LAB       Review of patient's allergies indicates:   Allergen Reactions    Flagyl [metronidazole hcl] Nausea And Vomiting    Clindamycin Diarrhea    Metronidazole      No current facility-administered medications for this encounter.      Current Outpatient Medications   Medication    aspirin (ECOTRIN) 81 MG EC tablet    atorvastatin (LIPITOR) 40 MG tablet    clopidogrel (PLAVIX) 75 mg tablet    ergocalciferol (ERGOCALCIFEROL) 50,000 unit Cap    famotidine (PEPCID) 20 MG tablet    ferrous sulfate 325 (65 FE) MG EC tablet    gabapentin (NEURONTIN) 300 MG capsule    levoFLOXacin (LEVAQUIN) 250 MG tablet    magnesium oxide (MAG-OX) 400 mg (241.3 mg  "magnesium) tablet    midodrine (PROAMATINE) 10 MG tablet    sevelamer carbonate (RENVELA) 800 mg Tab    vitamin renal formula, B-complex-vitamin c-folic acid, (B COMPLEX-C-FOLIC ACID) 1 mg Cap    insulin aspart U-100 (NOVOLOG) 100 unit/mL (3 mL) InPn pen    mupirocin (BACTROBAN) 2 % ointment    ondansetron (ZOFRAN-ODT) 4 MG TbDL    ONETOUCH VERIO Strp    pen needle, diabetic 31 gauge x 3/16" Ndle     Family History     Problem Relation (Age of Onset)    Cancer Maternal Grandmother, Paternal Grandfather    Diabetes Maternal Aunt, Paternal Aunt        Tobacco Use    Smoking status: Never Smoker    Smokeless tobacco: Never Used   Substance and Sexual Activity    Alcohol use: No     Comment: Pt reports some social use of about 1-2 drinks about every six months.    Drug use: No    Sexual activity: Not Currently     Partners: Male     Birth control/protection: None     Review of Systems   Constitutional: Negative for activity change, chills and fever.   HENT: Negative for trouble swallowing.    Eyes: Negative for photophobia and visual disturbance.   Respiratory: Negative for cough, chest tightness, shortness of breath and wheezing.    Cardiovascular: Positive for leg swelling. Negative for chest pain and palpitations.   Gastrointestinal: Positive for abdominal distention. Negative for abdominal pain, constipation, diarrhea, nausea and vomiting.   Genitourinary: Positive for decreased urine volume.   Musculoskeletal: Positive for back pain. Negative for neck pain and neck stiffness.   Neurological: Negative for dizziness, facial asymmetry, weakness and light-headedness.   Psychiatric/Behavioral: Negative for agitation and confusion. The patient is not nervous/anxious.      Objective:     Vital Signs (Most Recent):  Temp: 98.5 °F (36.9 °C) (07/06/19 0955)  Pulse: (!) 56 (07/06/19 1102)  Resp: 17 (07/06/19 1102)  BP: 125/68 (07/06/19 1102)  SpO2: 100 % (07/06/19 1102) Vital Signs (24h Range):  Temp:  [98.3 °F " (36.8 °C)-98.5 °F (36.9 °C)] 98.5 °F (36.9 °C)  Pulse:  [56-70] 56  Resp:  [13-20] 17  SpO2:  [98 %-100 %] 100 %  BP: (120-146)/(52-77) 125/68        There is no height or weight on file to calculate BMI.  There is no height or weight on file to calculate BSA.    No intake/output data recorded.    Physical Exam   Constitutional: She is oriented to person, place, and time. She appears well-developed. No distress.   HENT:   Head: Normocephalic and atraumatic.   Right Ear: External ear normal.   Left Ear: External ear normal.   Eyes: Conjunctivae and EOM are normal. Right eye exhibits no discharge. Left eye exhibits no discharge.   Neck: Normal range of motion. Neck supple.   Cardiovascular: Normal rate and regular rhythm. Exam reveals no gallop and no friction rub.   No murmur heard.  Pulmonary/Chest: Effort normal and breath sounds normal. No respiratory distress. She has no wheezes. She has no rales.   Abdominal: She exhibits distension. There is no tenderness.   Musculoskeletal: She exhibits no edema or deformity.   Neurological: She is alert and oriented to person, place, and time.   Skin: Skin is warm and dry. She is not diaphoretic.   R femoral tunneled catheter   Psychiatric: She has a normal mood and affect. Her behavior is normal.   Vitals reviewed.      Significant Labs:  CBC:   Recent Labs   Lab 07/06/19  0824   WBC 9.72   RBC 2.85*   HGB 9.3*   HCT 30.6*      *   MCH 32.6*   MCHC 30.4*     CMP:   Recent Labs   Lab 07/06/19  0824   *   CALCIUM 10.8*   ALBUMIN 3.3*   PROT 8.6*   *   K 4.1   CO2 22*   CL 93*   BUN 16   CREATININE 5.9*   ALKPHOS 89   ALT 20   AST 41*   BILITOT 0.4         Assessment/Plan:     ESRD (end stage renal disease) on dialysis  ESRD on iHD  [facility]  Nocturnal dialysis  [facility] tunneled dialysis catheter (Placed 06/27)  Reports an EDW ~ 90.6 kg  Scheduled midodrine, recently increased to 20 mg TID    Plan/Recommendations:  -No urgent indication for RRT  today.  Ok to proceed with external fixation  -after procedure today, will have dialysis nurse attempt to instill Cathflo to both ports of her dialysis catheter to dwell overnight.  -will attempt HD tomorrow and assess patency of catheter.  -will continue to f/u.  -renal diet when cleared by primary team          Andrew Ragsdale NP  Nephrology  [facility]

## 2019-07-06 NOTE — SUBJECTIVE & OBJECTIVE
Past Medical History:   Diagnosis Date    Abnormal finding on Pap smear, HPV DNA positive     Anemia associated with chronic renal failure     on Epogen    Blood type B+     Bulging discs - symptomatic      CAD (coronary artery disease)     Cardiomyopathy 3/13/2019    Diabetes mellitus, type 2     ESRD (end stage renal disease) 2004    FSGS (focal segmental glomerulosclerosis)     with collapsing    Hyperlipidemia     Hypertension     Neuropathy     NSTEMI (non-ST elevated myocardial infarction) 3/29/2019    Obesity     Secondary hyperparathyroidism, renal     TIA (transient ischemic attack)     Uterine fibroid     small uterine        Past Surgical History:   Procedure Laterality Date    ANGIOGRAM, CORONARY ARTERY N/A 4/15/2019    Performed by Roland Napier MD at [facility] CATH LAB    CARDIAC CATHETERIZATION      PCI x 2     SECTION, CLASSIC      COLONOSCOPY N/A 2018    Performed by Rodrigue Russell MD at [facility] ENDO (2ND FLR)    DEBRIDEMENT-WOUND Left 2016    Performed by [facility]a [facility]ox MD at [facility] OR    DIALYSIS FISTULA CREATION      multiple fistulas and grafts before PD     EGD (ESOPHAGOGASTRODUODENOSCOPY) N/A 3/14/2019    Performed by Adolph Davila MD at [facility] ENDO    EGD (ESOPHAGOGASTRODUODENOSCOPY) N/A 3/13/2019    Performed by Adolph Davila MD at [facility] ENDO    INCISION AND DRAINAGE (I&D), LABIAL N/A 2016    Performed by Harmony Fernandez MD at [facility] OR    INCISION AND DRAINAGE (I&D), LABIAL (ADD ON) Left 2016    Performed by Teressa Maddox MD at [facility] OR    INCISION AND DRAINAGE OF WOUND Left     VULVAR ABCESS WITH NECROSIS    Insertion, Catheter, Central Venous, Hemodialysis N/A 3/28/2019    Performed by Kimo Weeks MD at [facility] CATH LAB    Insertion, Catheter, Central Venous, Hemodialysis N/A 3/18/2019    Performed by Kimo Weeks MD at [facility] CATH LAB    INSERTION, CATHETER, TUNNELED ABORTED Left 3/14/2019     Performed by [facility]ando [facility] at [facility] OR    INSERTION, GRAFT, ARTERIOVENOUS, RIGHT ARM Right 3/22/2019    Performed by BESSIE Wynn III, MD at [facility] OR 2ND FLR    INSERTION, INTRA-AORTIC BALLOON PUMP  4/15/2019    Performed by Roland Napier MD at [facility] CATH LAB    Left heart cath Left 4/15/2019    Performed by Roland Napier MD at [facility] CATH LAB    ORIF, HIP Left 9/13/2018    Performed by Tevin [facility]ullon MD at [facility] OR    PERITONEAL CATHETER INSERTION      PERMCATH REWIRE- TUNNELED CATH REWIRE Left 11/13/2017    Performed by Baldev Munroe MD at [facility] CATH LAB    PERMCATH REWIRE- TUNNELED CATH REWIRE N/A 10/5/2017    Performed by Baldev Munroe MD at [facility] CATH LAB    REMOVAL, CATHETER, DIALYSIS, PERITONEAL N/A 3/14/2019    Performed by Servando Solis MD at [facility] OR    REPLACEMENT, CATHETER, DIALYSIS, OVER GUIDEWIRE, USING EXISTING VENOUS ACCESS N/A 6/27/2019    Performed by Kimo Weeks MD at [facility] CATH LAB    REPLACEMENT, CATHETER, DIALYSIS, OVER GUIDEWIRE, USING EXISTING VENOUS ACCESS Left 5/24/2019    Performed by Baldev Munroe MD at [facility] CATH LAB    UMBILICAL HERNIA REPAIR      Venogram, possible intervention Right 3/20/2019    Performed by BESSIE Wynn III, MD at [facility] CATH LAB       Review of patient's allergies indicates:   Allergen Reactions    Flagyl [metronidazole hcl] Nausea And Vomiting    Clindamycin Diarrhea    Metronidazole        No current facility-administered medications for this encounter.      Current Outpatient Medications   Medication Sig    aspirin (ECOTRIN) 81 MG EC tablet Take 1 tablet (81 mg total) by mouth once daily.    atorvastatin (LIPITOR) 40 MG tablet Take 40 mg by mouth every evening.     clopidogrel (PLAVIX) 75 mg tablet Take 1 tablet (75 mg total) by mouth once daily.    ergocalciferol (ERGOCALCIFEROL) 50,000 unit Cap     famotidine (PEPCID) 20 MG tablet Take 1 tablet (20 mg total) by mouth nightly as needed for Heartburn.     "ferrous sulfate 325 (65 FE) MG EC tablet Take 1 tablet (325 mg total) by mouth 2 (two) times daily. (Patient taking differently: Take 325 mg by mouth once daily. )    gabapentin (NEURONTIN) 300 MG capsule Take 1 capsule (300 mg total) by mouth 2 (two) times daily.    levoFLOXacin (LEVAQUIN) 250 MG tablet Take 1 tablet (250 mg total) by mouth once daily.    magnesium oxide (MAG-OX) 400 mg (241.3 mg magnesium) tablet Take 1 tablet (400 mg total) by mouth 3 (three) times daily. (Patient taking differently: Take 400 mg by mouth once daily. )    midodrine (PROAMATINE) 10 MG tablet One to two tab po q 8 hours. May take dose during dialysis if bp drops below SBP 90. (Patient taking differently: 20 mg. One to two tab po q 8 hours. May take dose during dialysis if bp drops below SBP 90.)    sevelamer carbonate (RENVELA) 800 mg Tab Take 1 tablet (800 mg total) by mouth 3 (three) times daily with meals.    vitamin renal formula, B-complex-vitamin c-folic acid, (B COMPLEX-C-FOLIC ACID) 1 mg Cap Take 1 capsule by mouth once daily. 1 Capsule Oral Every day    insulin aspart U-100 (NOVOLOG) 100 unit/mL (3 mL) InPn pen Inject 0-5 Units into the skin before meals and at bedtime as needed (Hyperglycemia).    mupirocin (BACTROBAN) 2 % ointment Apply topically 2 (two) times daily.    ondansetron (ZOFRAN-ODT) 4 MG TbDL Take 2 tablets (8 mg total) by mouth every 8 (eight) hours as needed.    ONETOUCH VERIO Strp USE 1 STRIP ONCE DAILY IN VITRO    pen needle, diabetic 31 gauge x 3/16" Ndle 1 each by Misc.(Non-Drug; Combo Route) route 4 (four) times daily before meals and nightly.     Family History     Problem Relation (Age of Onset)    Cancer Maternal Grandmother, Paternal Grandfather    Diabetes Maternal Aunt, Paternal Aunt        Tobacco Use    Smoking status: Never Smoker    Smokeless tobacco: Never Used   Substance and Sexual Activity    Alcohol use: No     Comment: Pt reports some social use of about 1-2 drinks about " every six months.    Drug use: No    Sexual activity: Not Currently     Partners: Male     Birth control/protection: None     ROS   Per primary team note   Objective:     Vital Signs (Most Recent):  Temp: 98.5 °F (36.9 °C) (07/06/19 0955)  Pulse: 60 (07/06/19 1000)  Resp: 19 (07/06/19 1000)  BP: 120/74 (07/06/19 1000)  SpO2: 100 % (07/06/19 1000) Vital Signs (24h Range):  Temp:  [98.3 °F (36.8 °C)-98.5 °F (36.9 °C)] 98.5 °F (36.9 °C)  Pulse:  [58-70] 60  Resp:  [13-20] 19  SpO2:  [98 %-100 %] 100 %  BP: (120-146)/(52-77) 120/74           Ortho/SPM Exam   RLE:  Skin intact throughout, no scars present  moderate swelling inferior to the knee anteriorly  No ecchymosis, erythema, or signs of cellulitis  TTP inferior and anterior knee  No joint line tenderness  No tenderness to palpation of proximal, middle, or distal aspects of femur or fibula  No tenderness to palpation of foot  ROM at knee limited due to pain  Full painless ROM of hip and ankle  Compartments soft  SILT Sa/Collins/DP/SP/T  Motor intact EHL/FHL/TA/Gastroc  dopplerable PT/DP  Brisk capillary refill       Significant Labs:   CBC:   Recent Labs   Lab 07/06/19  0824   WBC 9.72   HGB 9.3*   HCT 30.6*        CMP:   Recent Labs   Lab 07/06/19  0824   *   K 4.1   CL 93*   CO2 22*   *   BUN 16   CREATININE 5.9*   CALCIUM 10.8*   PROT 8.6*   ALBUMIN 3.3*   BILITOT 0.4   ALKPHOS 89   AST 41*   ALT 20   ANIONGAP 16   EGFRNONAA 7.8*       Significant Imaging: I have reviewed all pertinent imaging results/findings.   Xray tib/fib/knee/femur/ankle showing schatzker 6 tibial plateau fracture

## 2019-07-06 NOTE — H&P
[facility] Medicine  History & Physical    Patient Name: Jenni Todd  MRN: [id number]  Admission Date: 7/6/2019  Attending Physician: Lilia Zurita MD   Primary Care Provider: Michael Tan Iii, MD    [facility] Medicine Team: [facility] 2 Kev Villegas MD     Patient information was obtained from patient and ER records.     Subjective:     Principal Problem:Closed fracture of right tibial plateau    Chief Complaint:   Chief Complaint   Patient presents with    Knee Injury        HPI: Patient is a 47 yo F with PMH of ESRD (on HD MWF), DM2, neuropathy, HTN, HLD, CAD, obesity, bulging discs in back who presented to the ED via ambulance following a fall. Patient was walking down the steps in front of her house and fell down and twisted her R leg. She described her leg pain as a 10/10. Last night, while at dialysis, her nurse could not aspirate her R femoral line, and she was instructed to come to the ER due to a clot in her line. She was on her way out of her house to come to the ER when she fell. At baseline, she uses a walker to get around due to her neuropathy in her feet and has a history of falls. In 2018, she fractured her hip and was operated on by Dr. Grullon.    She has had an extensive history for recurrent vascular access problems. She was on peritoneal dialysis for 14 years prior to February 2019, at which point she developed peritonitis, was admitted, and had her catheter removed and switched to HD. 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 Her last dialysis was on Wednesday, 07/03. She plans for a peritoneal dialysis catheter placement on July 25th with Dr. Tucker at [facility].    In the ED, XRs of her pelvis, knee, femur, tibia, and ankle of her R leg were ordered. R tibia XR revealed a tibial fracture and signs of osteopenia. Ortho and Nephrology were consulted. Her ASA and  plavix were held, she was splinted, and Ortho planned for an ex-fix today, which was cleared by Nephrology.           Past Medical History:   Diagnosis Date    Abnormal finding on Pap smear, HPV DNA positive     Anemia associated with chronic renal failure     on Epogen    Blood type B+     Bulging discs - symptomatic      CAD (coronary artery disease)     Cardiomyopathy 3/13/2019    Diabetes mellitus, type 2     ESRD (end stage renal disease) 2004    FSGS (focal segmental glomerulosclerosis)     with collapsing    Hyperlipidemia     Hypertension     Neuropathy     NSTEMI (non-ST elevated myocardial infarction) 3/29/2019    Obesity     Secondary hyperparathyroidism, renal     TIA (transient ischemic attack)     Uterine fibroid     small uterine        Past Surgical History:   Procedure Laterality Date    ANGIOGRAM, CORONARY ARTERY N/A 4/15/2019    Performed by [facility]ier [facility] at [facility] CATH LAB    CARDIAC CATHETERIZATION      PCI x 2     SECTION, CLASSIC      COLONOSCOPY N/A 2018    Performed by Rodrigue Russell MD at [facility] ENDO (2ND FLR)    DEBRIDEMENT-WOUND Left 2016    Performed by [facility]a [facility]ox MD at [facility] OR    DIALYSIS FISTULA CREATION      multiple fistulas and grafts before PD     EGD (ESOPHAGOGASTRODUODENOSCOPY) N/A 3/14/2019    Performed by Adolph [facility]ila MD at [facility] ENDO    EGD (ESOPHAGOGASTRODUODENOSCOPY) N/A 3/13/2019    Performed by Adolph [facility]ila MD at [facility] ENDO    INCISION AND DRAINAGE (I&D), LABIAL N/A 2016    Performed by Harmony Fernandez MD at [facility] OR    INCISION AND DRAINAGE (I&D), LABIAL (ADD ON) Left 2016    Performed by [facility]ox MD at [facility] OR    INCISION AND DRAINAGE OF WOUND Left     VULVAR ABCESS WITH NECROSIS    Insertion, Catheter, Central Venous, Hemodialysis N/A 3/28/2019    Performed by Kimo Weeks MD at [facility] CATH LAB    Insertion, Catheter, Central Venous, Hemodialysis N/A  3/18/2019    Performed by [facility] at [facility] CATH LAB    INSERTION, CATHETER, TUNNELED ABORTED Left 3/14/2019    Performed by [facility] at [facility] OR    INSERTION, GRAFT, ARTERIOVENOUS, RIGHT ARM Right 3/22/2019    Performed by BESSIE Wynn III, MD at [facility] OR 2ND FLR    INSERTION, INTRA-AORTIC BALLOON PUMP  4/15/2019    Performed by Roland Napier MD at [facility] CATH LAB    Left heart cath Left 4/15/2019    Performed by Roland Napier MD at [facility] CATH LAB    ORIF, HIP Left 9/13/2018    Performed by Tevin [facility]on MD at [facility] OR    PERITONEAL CATHETER INSERTION      PERMCATH REWIRE- TUNNELED CATH REWIRE Left 11/13/2017    Performed by Baldev Munroe MD at [facility] CATH LAB    PERMCATH REWIRE- TUNNELED CATH REWIRE N/A 10/5/2017    Performed by Baldev [facility]roe MD at [facility] CATH LAB    REMOVAL, CATHETER, DIALYSIS, PERITONEAL N/A 3/14/2019    Performed by [facility] at [facility] OR    REPLACEMENT, CATHETER, DIALYSIS, OVER GUIDEWIRE, USING EXISTING VENOUS ACCESS N/A 6/27/2019    Performed by Kimo Weeks MD at [facility] CATH LAB    REPLACEMENT, CATHETER, DIALYSIS, OVER GUIDEWIRE, USING EXISTING VENOUS ACCESS Left 5/24/2019    Performed by Baldev [facility]roe MD at [facility] CATH LAB    UMBILICAL HERNIA REPAIR      Venogram, possible intervention Right 3/20/2019    Performed by BESSIE Wynn III, MD at [facility] CATH LAB       Review of patient's allergies indicates:   Allergen Reactions    Flagyl [metronidazole hcl] Nausea And Vomiting    Clindamycin Diarrhea    Metronidazole        No current facility-administered medications on file prior to encounter.      Current Outpatient Medications on File Prior to Encounter   Medication Sig    aspirin (ECOTRIN) 81 MG EC tablet Take 1 tablet (81 mg total) by mouth once daily.    atorvastatin (LIPITOR) 40 MG tablet Take 40 mg by mouth every evening.     clopidogrel (PLAVIX) 75 mg tablet Take 1 tablet (75 mg total) by mouth once daily.     "ergocalciferol (ERGOCALCIFEROL) 50,000 unit Cap     famotidine (PEPCID) 20 MG tablet Take 1 tablet (20 mg total) by mouth nightly as needed for Heartburn.    ferrous sulfate 325 (65 FE) MG EC tablet Take 1 tablet (325 mg total) by mouth 2 (two) times daily. (Patient taking differently: Take 325 mg by mouth once daily. )    gabapentin (NEURONTIN) 300 MG capsule Take 1 capsule (300 mg total) by mouth 2 (two) times daily.    levoFLOXacin (LEVAQUIN) 250 MG tablet Take 1 tablet (250 mg total) by mouth once daily.    magnesium oxide (MAG-OX) 400 mg (241.3 mg magnesium) tablet Take 1 tablet (400 mg total) by mouth 3 (three) times daily. (Patient taking differently: Take 400 mg by mouth once daily. )    midodrine (PROAMATINE) 10 MG tablet One to two tab po q 8 hours. May take dose during dialysis if bp drops below SBP 90. (Patient taking differently: 20 mg. One to two tab po q 8 hours. May take dose during dialysis if bp drops below SBP 90.)    sevelamer carbonate (RENVELA) 800 mg Tab Take 1 tablet (800 mg total) by mouth 3 (three) times daily with meals.    vitamin renal formula, B-complex-vitamin c-folic acid, (B COMPLEX-C-FOLIC ACID) 1 mg Cap Take 1 capsule by mouth once daily. 1 Capsule Oral Every day    insulin aspart U-100 (NOVOLOG) 100 unit/mL (3 mL) InPn pen Inject 0-5 Units into the skin before meals and at bedtime as needed (Hyperglycemia).    mupirocin (BACTROBAN) 2 % ointment Apply topically 2 (two) times daily.    ondansetron (ZOFRAN-ODT) 4 MG TbDL Take 2 tablets (8 mg total) by mouth every 8 (eight) hours as needed.    ONETOUCH VERIO Strp USE 1 STRIP ONCE DAILY IN VITRO    pen needle, diabetic 31 gauge x 3/16" Ndle 1 each by Misc.(Non-Drug; Combo Route) route 4 (four) times daily before meals and nightly.     Family History     Problem Relation (Age of Onset)    Cancer Maternal Grandmother, Paternal Grandfather    Diabetes Maternal Aunt, Paternal Aunt        Tobacco Use    Smoking status: Never " Smoker    Smokeless tobacco: Never Used   Substance and Sexual Activity    Alcohol use: No     Comment: Pt reports some social use of about 1-2 drinks about every six months.    Drug use: No    Sexual activity: Not Currently     Partners: Male     Birth control/protection: None     Review of Systems   Constitutional: Negative for activity change, appetite change, chills and fever.   HENT: Negative for sore throat and trouble swallowing.    Respiratory: Negative for cough, choking and shortness of breath.    Cardiovascular: Negative for chest pain, palpitations and leg swelling.   Gastrointestinal: Positive for abdominal distention. Negative for abdominal pain and vomiting.   Genitourinary: Negative for difficulty urinating.   Musculoskeletal: Negative for arthralgias and back pain.        Diffuse R leg pain at 10/10   Skin: Negative for rash and wound.   Neurological: Negative for dizziness, light-headedness and headaches.   Psychiatric/Behavioral: Negative for agitation.     Objective:     Vital Signs (Most Recent):  Temp: 97 °F (36.1 °C) (07/06/19 1515)  Pulse: 61 (07/06/19 1515)  Resp: 16 (07/06/19 1515)  BP: (!) 191/81 (07/06/19 1515)  SpO2: 99 % (07/06/19 1515) Vital Signs (24h Range):  Temp:  [97 °F (36.1 °C)-98.5 °F (36.9 °C)] 97 °F (36.1 °C)  Pulse:  [56-70] 61  Resp:  [13-20] 16  SpO2:  [98 %-100 %] 99 %  BP: (120-191)/(52-81) 191/81        There is no height or weight on file to calculate BMI.    Physical Exam   Constitutional: She is oriented to person, place, and time. She appears well-developed and well-nourished. No distress.   HENT:   Head: Normocephalic and atraumatic.   Eyes: Conjunctivae and EOM are normal.   Neck: Normal range of motion. Neck supple.   Cardiovascular: Normal rate, regular rhythm and normal heart sounds.   No murmur heard.  Pulmonary/Chest: Effort normal and breath sounds normal. No respiratory distress.   Abdominal: Bowel sounds are normal. She exhibits distension. There is no  tenderness. There is no rebound.   Musculoskeletal: She exhibits no edema.   Splint/cast in place on R leg from her ankle all the way 3/4ths up her R thigh.    Distal pulses intact bilaterally   Neurological: She is alert and oriented to person, place, and time. No sensory deficit.   Skin: Skin is warm and dry.   Psychiatric: She has a normal mood and affect.         CRANIAL NERVES     CN III, IV, VI   Extraocular motions are normal.        Significant Labs: All pertinent labs within the past 24 hours have been reviewed.    Significant Imaging: I have reviewed all pertinent imaging results/findings within the past 24 hours.    Assessment/Plan:     * Closed fracture of right tibial plateau  -07/06, XR showing tibial fracture  -Ortho consulted and following  -OR for ex-fix on 07/06      ESRD (end stage renal disease) on dialysis  -reports a clot in vascular access line  -Neprhology consulted and following  -will attempt to flush line tomorrow and do dialysis      Bulging discs - symptomatic   -gabapentin 300 BID      CAD (coronary artery disease)  -holding home ASA and plavix      Hyperlipidemia  -restart home lipitor 40      Anemia in chronic kidney disease, on chronic dialysis  -Iron 325 mg BID      Neuropathy  -History of falls 2/2 lower extremity neuropathy.  -Patient fell while walking down the steps outside of her house  -Resume home gabapentin 300 BID        VTE Risk Mitigation (From admission, onward)        Ordered     IP VTE HIGH RISK PATIENT  Once      07/06/19 1458     Place sequential compression device  Until discontinued      07/06/19 0452             Kev Villegas MD  Department of Hospital Medicine   [facility]

## 2019-07-06 NOTE — CARE UPDATE
Nephrology Care Update  Ortho team called for clearing patient prior to surgical procedure today.   Patient's labs optimal.  She receives treatments on a MWF schedule. No need for extra HD session prior to surgery.    We will follow patient for her chronic HD sessions managing her ESRD treatments.    Oren Delcid MD  Nephrology  [facility]

## 2019-07-06 NOTE — ASSESSMENT & PLAN NOTE
-History of falls 2/2 lower extremity neuropathy.  -Patient fell while walking down the steps outside of her house  -Resume home gabapentin 300 BID

## 2019-07-06 NOTE — ASSESSMENT & PLAN NOTE
Jenni Todd is a 48 y.o. female with closed Schatzker 6 tibial plateau fracture  - splinted in ER  - marked, booked, consented for external fixation of her fracture today  - She has been NPO since midnight  - nephrology has seen and cleared her for surgery, without need for dialysis before. They will follow and manage dialysis while inpatient  - pain control and home meds per primary team ([facility])    Dispo: To OR today for ex-fix. Definitive surgery at later date. Consents obtained for both surgeries.

## 2019-07-06 NOTE — ED TRIAGE NOTES
To the ED via EMS after falling this am.  Fell onto he buttocks, now with right knee pain and swelling.

## 2019-07-06 NOTE — SUBJECTIVE & OBJECTIVE
Past Medical History:   Diagnosis Date    Abnormal finding on Pap smear, HPV DNA positive     Anemia associated with chronic renal failure     on Epogen    Blood type B+     Bulging discs - symptomatic      CAD (coronary artery disease)     Cardiomyopathy 3/13/2019    Diabetes mellitus, type 2     ESRD (end stage renal disease) 2004    FSGS (focal segmental glomerulosclerosis)     with collapsing    Hyperlipidemia     Hypertension     Neuropathy     NSTEMI (non-ST elevated myocardial infarction) 3/29/2019    Obesity     Secondary hyperparathyroidism, renal     TIA (transient ischemic attack)     Uterine fibroid     small uterine        Past Surgical History:   Procedure Laterality Date    ANGIOGRAM, CORONARY ARTERY N/A 4/15/2019    Performed by [facility]ier MD at [facility] CATH LAB    CARDIAC CATHETERIZATION      PCI x 2     SECTION, CLASSIC      COLONOSCOPY N/A 2018    Performed by Rodrigue Russell MD at [facility] ENDO (2ND FLR)    DEBRIDEMENT-WOUND Left 2016    Performed by [facility]ressa Maddox MD at [facility] OR    DIALYSIS FISTULA CREATION      multiple fistulas and grafts before PD     EGD (ESOPHAGOGASTRODUODENOSCOPY) N/A 3/14/2019    Performed by Adolph Davila MD at [facility] ENDO    EGD (ESOPHAGOGASTRODUODENOSCOPY) N/A 3/13/2019    Performed by Adolph Davila MD at [facility] ENDO    INCISION AND DRAINAGE (I&D), LABIAL N/A 2016    Performed by Harmony Fernandez MD at [facility] OR    INCISION AND DRAINAGE (I&D), LABIAL (ADD ON) Left 2016    Performed by Teressa Maddox MD at [facility] OR    INCISION AND DRAINAGE OF WOUND Left     VULVAR ABCESS WITH NECROSIS    Insertion, Catheter, Central Venous, Hemodialysis N/A 3/28/2019    Performed by Kimo Weeks MD at [facility] CATH LAB    Insertion, Catheter, Central Venous, Hemodialysis N/A 3/18/2019    Performed by Kimo Weeks MD at [facility] CATH LAB    INSERTION, CATHETER, TUNNELED ABORTED Left 3/14/2019     [facility]formed by [facility] MD at [facility] OR    INSERTION, GRAFT, ARTERIOVENOUS, RIGHT ARM Right 3/22/2019    Performed by BESSIE Wynn III, MD at [facility] OR 2ND FLR    INSERTION, INTRA-AORTIC BALLOON PUMP  4/15/2019    Performed by Roland Napier MD at [facility] CATH LAB    Left heart cath Left 4/15/2019    Performed by Roland Napier MD at [facility] CATH LAB    ORIF, HIP Left 9/13/2018    Performed by Tevin Grullon MD at [facility] OR    PERITONEAL CATHETER INSERTION      PERMCATH REWIRE- TUNNELED CATH REWIRE Left 11/13/2017    Performed by Baldev Munroe MD at [facility] CATH LAB    PERMCATH REWIRE- TUNNELED CATH REWIRE N/A 10/5/2017    Performed by Baldev Munroe MD at [facility] CATH LAB    REMOVAL, CATHETER, DIALYSIS, PERITONEAL N/A 3/14/2019    Performed by Servando Solis MD at [facility] OR    REPLACEMENT, CATHETER, DIALYSIS, OVER GUIDEWIRE, USING EXISTING VENOUS ACCESS N/A 6/27/2019    Performed by Kimo Weeks MD at [facility] CATH LAB    REPLACEMENT, CATHETER, DIALYSIS, OVER GUIDEWIRE, USING EXISTING VENOUS ACCESS Left 5/24/2019    Performed by Baldev Munroe MD at [facility] CATH LAB    UMBILICAL HERNIA REPAIR      Venogram, possible intervention Right 3/20/2019    Performed by BESSIE Wynn III, MD at [facility] CATH LAB       Review of patient's allergies indicates:   Allergen Reactions    Flagyl [metronidazole hcl] Nausea And Vomiting    Clindamycin Diarrhea    Metronidazole        No current facility-administered medications on file prior to encounter.      Current Outpatient Medications on File Prior to Encounter   Medication Sig    aspirin (ECOTRIN) 81 MG EC tablet Take 1 tablet (81 mg total) by mouth once daily.    atorvastatin (LIPITOR) 40 MG tablet Take 40 mg by mouth every evening.     clopidogrel (PLAVIX) 75 mg tablet Take 1 tablet (75 mg total) by mouth once daily.    ergocalciferol (ERGOCALCIFEROL) 50,000 unit Cap     famotidine (PEPCID) 20 MG tablet Take 1 tablet (20 mg total) by mouth  "nightly as needed for Heartburn.    ferrous sulfate 325 (65 FE) MG EC tablet Take 1 tablet (325 mg total) by mouth 2 (two) times daily. (Patient taking differently: Take 325 mg by mouth once daily. )    gabapentin (NEURONTIN) 300 MG capsule Take 1 capsule (300 mg total) by mouth 2 (two) times daily.    levoFLOXacin (LEVAQUIN) 250 MG tablet Take 1 tablet (250 mg total) by mouth once daily.    magnesium oxide (MAG-OX) 400 mg (241.3 mg magnesium) tablet Take 1 tablet (400 mg total) by mouth 3 (three) times daily. (Patient taking differently: Take 400 mg by mouth once daily. )    midodrine (PROAMATINE) 10 MG tablet One to two tab po q 8 hours. May take dose during dialysis if bp drops below SBP 90. (Patient taking differently: 20 mg. One to two tab po q 8 hours. May take dose during dialysis if bp drops below SBP 90.)    sevelamer carbonate (RENVELA) 800 mg Tab Take 1 tablet (800 mg total) by mouth 3 (three) times daily with meals.    vitamin renal formula, B-complex-vitamin c-folic acid, (B COMPLEX-C-FOLIC ACID) 1 mg Cap Take 1 capsule by mouth once daily. 1 Capsule Oral Every day    insulin aspart U-100 (NOVOLOG) 100 unit/mL (3 mL) InPn pen Inject 0-5 Units into the skin before meals and at bedtime as needed (Hyperglycemia).    mupirocin (BACTROBAN) 2 % ointment Apply topically 2 (two) times daily.    ondansetron (ZOFRAN-ODT) 4 MG TbDL Take 2 tablets (8 mg total) by mouth every 8 (eight) hours as needed.    ONETOUCH VERIO Strp USE 1 STRIP ONCE DAILY IN VITRO    pen needle, diabetic 31 gauge x 3/16" Ndle 1 each by Misc.(Non-Drug; Combo Route) route 4 (four) times daily before meals and nightly.     Family History     Problem Relation (Age of Onset)    Cancer Maternal Grandmother, Paternal Grandfather    Diabetes Maternal Aunt, Paternal Aunt        Tobacco Use    Smoking status: Never Smoker    Smokeless tobacco: Never Used   Substance and Sexual Activity    Alcohol use: No     Comment: Pt reports some " social use of about 1-2 drinks about every six months.    Drug use: No    Sexual activity: Not Currently     Partners: Male     Birth control/protection: None     Review of Systems   Constitutional: Negative for activity change, appetite change, chills and fever.   HENT: Negative for sore throat and trouble swallowing.    Respiratory: Negative for cough, choking and shortness of breath.    Cardiovascular: Negative for chest pain, palpitations and leg swelling.   Gastrointestinal: Positive for abdominal distention. Negative for abdominal pain and vomiting.   Genitourinary: Negative for difficulty urinating.   Musculoskeletal: Negative for arthralgias and back pain.        Diffuse R leg pain at 10/10   Skin: Negative for rash and wound.   Neurological: Negative for dizziness, light-headedness and headaches.   Psychiatric/Behavioral: Negative for agitation.     Objective:     Vital Signs (Most Recent):  Temp: 97 °F (36.1 °C) (07/06/19 1515)  Pulse: 61 (07/06/19 1515)  Resp: 16 (07/06/19 1515)  BP: (!) 191/81 (07/06/19 1515)  SpO2: 99 % (07/06/19 1515) Vital Signs (24h Range):  Temp:  [97 °F (36.1 °C)-98.5 °F (36.9 °C)] 97 °F (36.1 °C)  Pulse:  [56-70] 61  Resp:  [13-20] 16  SpO2:  [98 %-100 %] 99 %  BP: (120-191)/(52-81) 191/81        There is no height or weight on file to calculate BMI.    Physical Exam   Constitutional: She is oriented to person, place, and time. She appears well-developed and well-nourished. No distress.   HENT:   Head: Normocephalic and atraumatic.   Eyes: Conjunctivae and EOM are normal.   Neck: Normal range of motion. Neck supple.   Cardiovascular: Normal rate, regular rhythm and normal heart sounds.   No murmur heard.  Pulmonary/Chest: Effort normal and breath sounds normal. No respiratory distress.   Abdominal: Bowel sounds are normal. She exhibits distension. There is no tenderness. There is no rebound.   Musculoskeletal: She exhibits no edema.   Splint/cast in place on R leg from her ankle  all the way 3/4ths up her R thigh.    Distal pulses intact bilaterally   Neurological: She is alert and oriented to person, place, and time. No sensory deficit.   Skin: Skin is warm and dry.   Psychiatric: She has a normal mood and affect.         CRANIAL NERVES     CN III, IV, VI   Extraocular motions are normal.        Significant Labs: All pertinent labs within the past 24 hours have been reviewed.    Significant Imaging: I have reviewed all pertinent imaging results/findings within the past 24 hours.

## 2019-07-06 NOTE — HPI
Patient is a 49 yo F with PMH of ESRD (on HD MWF), DM2, neuropathy, HTN, HLD, CAD, obesity, bulging discs in back who presented to the ED via ambulance following a fall. Patient was walking down the steps in front of her house and fell down and twisted her R leg. She described her leg pain as a 10/10. Last night, while at dialysis, her nurse could not aspirate her R femoral line, and she was instructed to come to the ER due to a clot in her line. She was on her way out of her house to come to the ER when she fell. At baseline, she uses a walker to get around due to her neuropathy in her feet and has a history of falls. In 2018, she fractured her hip and was operated on by Dr. Grullon.    She has had an extensive history for recurrent vascular access problems. She was on peritoneal dialysis for 14 years prior to February 2019, at which point she developed peritonitis, was admitted, and had her catheter removed and switched to HD. 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 Her last dialysis was on Wednesday, 07/03. She plans for a peritoneal dialysis catheter placement on July 25th with Dr. Tucker at [facility].    In the ED, XRs of her pelvis, knee, femur, tibia, and ankle of her R leg were ordered. R tibia XR revealed a tibial fracture and signs of osteopenia. Ortho and Nephrology were consulted. Her ASA and plavix were held, she was splinted, and Ortho planned for an ex-fix today, which was cleared by Nephrology.

## 2019-07-06 NOTE — ASSESSMENT & PLAN NOTE
ESRD on iHD  [facility]  Nocturnal dialysis  RI tunneled dialysis catheter (Placed 06/27)  Reports an EDW ~ 90.6 kg  Scheduled midodrine, recently increased to 20 mg TID    Plan/Recommendations:  -No urgent indication for RRT today.  Ok to proceed with external fixation  -after procedure today, will have dialysis nurse attempt to instill Cathflo to both ports of her dialysis catheter to dwell overnight.  -will attempt HD tomorrow and assess patency of catheter.  -will continue to f/u.  -renal diet when cleared by primary team

## 2019-07-06 NOTE — ED PROVIDER NOTES
"Encounter Date: 7/6/2019    SCRIBE #1 NOTE: I, Kathy Conroy, am scribing for, and in the presence of,  Dr. Veras. I have scribed the entire note. the EKG reading.       History     Chief Complaint   Patient presents with    Knee Injury     Time patient was seen by the provider: 8:07 AM      The patient is a 48 y.o. female with history of ESRD HD/MWF, HTN, hyperlipidemia, obesity, CAD, neuropathy, type 2 diabetes who presents to the ED with a complaint of knee injury. Patient reports while walking outside of her home, she missed a step and twisted her right knee and fell on her buttock. She now has swelling and pain to her right knee. She denies head pain or back pain. No LOC. She was on her way to the hospital for evaluation of clotted dialysis access. She states she started nocturnal dialysis 5 days ago and her access clotted 1 hr before her treatment was over. She also reports her blood pressure has been fluctuating and when it is low it is, "extremely low." She denies any chance of pregnancy.     The history is provided by the patient.     Review of patient's allergies indicates:   Allergen Reactions    Flagyl [metronidazole hcl] Nausea And Vomiting    Clindamycin Diarrhea    Metronidazole      Past Medical History:   Diagnosis Date    Abnormal finding on Pap smear, HPV DNA positive 2014    Anemia associated with chronic renal failure     on Epogen    Blood type B+     Bulging discs - symptomatic      CAD (coronary artery disease)     Cardiomyopathy 3/13/2019    Diabetes mellitus, type 2     ESRD (end stage renal disease) 04/20/2004    FSGS (focal segmental glomerulosclerosis)     with collapsing    Hyperlipidemia     Hypertension 2004    Neuropathy     NSTEMI (non-ST elevated myocardial infarction) 3/29/2019    Obesity     Secondary hyperparathyroidism, renal     TIA (transient ischemic attack)     Uterine fibroid     small uterine      Past Surgical History:   Procedure Laterality Date "    ANGIOGRAM, CORONARY ARTERY N/A 4/15/2019    Performed by Roland [facility]ier [facility] at [facility] CATH LAB    CARDIAC CATHETERIZATION      PCI x 2     SECTION, CLASSIC      COLONOSCOPY N/A 2018    Performed by Rodrigue Russell MD at [facility] ENDO (2ND FLR)    DEBRIDEMENT-WOUND Left 2016    Performed by [facility]ressa Maddox MD at [facility] OR    DIALYSIS FISTULA CREATION      multiple fistulas and grafts before PD     EGD (ESOPHAGOGASTRODUODENOSCOPY) N/A 3/14/2019    Performed by Adolph Davila MD at [facility] ENDO    EGD (ESOPHAGOGASTRODUODENOSCOPY) N/A 3/13/2019    Performed by Adolph Davila MD at [facility] ENDO    INCISION AND DRAINAGE (I&D), LABIAL N/A 2016    Performed by Harmony Fernandez MD at [facility] OR    INCISION AND DRAINAGE (I&D), LABIAL (ADD ON) Left 2016    Performed by Teressa Maddox MD at [facility] OR    INCISION AND DRAINAGE OF WOUND Left     VULVAR ABCESS WITH NECROSIS    Insertion, Catheter, Central Venous, Hemodialysis N/A 3/28/2019    Performed by Kimo Weeks MD at [facility] CATH LAB    Insertion, Catheter, Central Venous, Hemodialysis N/A 3/18/2019    Performed by Kimo Weeks MD at [facility] CATH LAB    INSERTION, CATHETER, TUNNELED ABORTED Left 3/14/2019    Performed by Servando Solis MD at [facility] OR    INSERTION, GRAFT, ARTERIOVENOUS, RIGHT ARM Right 3/22/2019    Performed by BESSIE Wynn III, MD at [facility] OR 2ND FLR    INSERTION, INTRA-AORTIC BALLOON PUMP  4/15/2019    Performed by Roland Napier MD at [facility] CATH LAB    Left heart cath Left 4/15/2019    Performed by Roland Napier MD at [facility] CATH LAB    ORIF, HIP Left 2018    Performed by Tevin Grullon MD at [facility] OR    PERITONEAL CATHETER INSERTION      PERMCATH REWIRE- TUNNELED CATH REWIRE Left 2017    Performed by Baldev Munroe MD at [facility] CATH LAB    PERMCATH REWIRE- TUNNELED CATH REWIRE N/A 10/5/2017    Performed by Baldev Munroe MD at [facility] CATH LAB    REMOVAL, CATHETER,  DIALYSIS, PERITONEAL N/A 3/14/2019    Performed by [facility] at [facility] OR    REPLACEMENT, CATHETER, DIALYSIS, OVER GUIDEWIRE, USING EXISTING VENOUS ACCESS N/A 6/27/2019    Performed by [facility]eks [facility] at [facility] CATH LAB    REPLACEMENT, CATHETER, DIALYSIS, OVER GUIDEWIRE, USING EXISTING VENOUS ACCESS Left 5/24/2019    [facility] CATH [facility]    UMBILICAL HERNIA REPAIR      Venogram, possible intervention Right 3/20/2019    Performed by BESSIE Wynn III, MD at [facility] CATH LAB     Family History   Problem Relation Age of Onset    Cancer Maternal Grandmother     Cancer Paternal Grandfather     Diabetes Maternal Aunt     Diabetes Paternal Aunt     Kidney disease Neg Hx     Heart disease Neg Hx     Ovarian cancer Neg Hx     Breast cancer Neg Hx      Social History     Tobacco Use    Smoking status: Never Smoker    Smokeless tobacco: Never Used   Substance Use Topics    Alcohol use: No     Comment: Pt reports some social use of about 1-2 drinks about every six months.    Drug use: No     Review of Systems   Constitutional: Negative for fever.   HENT: Negative for sore throat.    Respiratory: Negative for shortness of breath.    Cardiovascular: Negative for chest pain.   Gastrointestinal: Negative for nausea.   Genitourinary: Negative for dysuria.   Musculoskeletal: Positive for arthralgias (right knee). Negative for back pain.   Skin: Negative for rash.   Neurological: Negative for weakness and headaches.   Hematological: Does not bruise/bleed easily.       Physical Exam     Initial Vitals [07/06/19 0725]   BP Pulse Resp Temp SpO2   (!) 146/52 70 18 98.3 °F (36.8 °C) 98 %      MAP       --         Physical Exam    Nursing note and vitals reviewed.  Constitutional: She appears well-developed and well-nourished. She is not diaphoretic. No distress.   HENT:   Head: Normocephalic and atraumatic.   Mouth/Throat: Oropharynx is clear and moist.   Eyes: Conjunctivae and EOM  are normal. Pupils are equal, round, and reactive to light.   Neck: Normal range of motion. Neck supple. No JVD present.   Cardiovascular: Normal rate, regular rhythm and normal heart sounds.   No murmur heard.  Normal DP pulse   Pulmonary/Chest: Breath sounds normal. No respiratory distress. She has no wheezes. She has no rhonchi. She has no rales.   Abdominal: Soft. Bowel sounds are normal. She exhibits no distension and no mass. There is no tenderness. There is no rebound and no guarding.   Musculoskeletal: Normal range of motion.   Swelling to her right knee at the tibial plateau area. Quadricepts and patellar tendon appear to be intact. Non tenderness to hip or ankle or foot. Dialysis access to right femoral vein without swelling.    Neurological: She is alert and oriented to person, place, and time. She has normal strength. No cranial nerve deficit or sensory deficit.   Intact sensation distally.   Skin: Skin is warm and dry. No rash noted.   Psychiatric: She has a normal mood and affect.         ED Course   Procedures  Labs Reviewed   CBC W/ AUTO DIFFERENTIAL - Abnormal; Notable for the following components:       Result Value    RBC 2.85 (*)     Hemoglobin 9.3 (*)     Hematocrit 30.6 (*)     Mean Corpuscular Volume 107 (*)     Mean Corpuscular Hemoglobin 32.6 (*)     Mean Corpuscular Hemoglobin Conc 30.4 (*)     RDW 18.7 (*)     All other components within normal limits   COMPREHENSIVE METABOLIC PANEL - Abnormal; Notable for the following components:    Sodium 131 (*)     Chloride 93 (*)     CO2 22 (*)     Glucose 150 (*)     Creatinine 5.9 (*)     Calcium 10.8 (*)     Total Protein 8.6 (*)     Albumin 3.3 (*)     AST 41 (*)     eGFR if  8.9 (*)     eGFR if non  7.8 (*)     All other components within normal limits   TRANSFERRIN - Abnormal; Notable for the following components:    Transferrin 196 (*)     All other components within normal limits    Narrative:     add on MG  [id number] PHOS [id number] TRSF [id number] PALB [id number] GHGB   [id number] per Taz Veras MD   07/06/2019  10:07    PREALBUMIN - Abnormal; Notable for the following components:    Prealbumin 16 (*)     All other components within normal limits    Narrative:     add on MG [id number] PHOS [id number] TRSF [id number] PALB [id number] GHGB   [id number] per Taz Veras MD   07/06/2019  10:07    PROTIME-INR   APTT   HEMOGLOBIN A1C   MAGNESIUM   PHOSPHORUS   PREALBUMIN   TRANSFERRIN   MAGNESIUM    Narrative:     add on MG [id number] PHOS [id number] TRSF [id number] PALB [id number] GHGB   [id number] per Taz Veras MD   07/06/2019  10:07    PHOSPHORUS    Narrative:     add on MG [id number] PHOS [id number] TRSF [id number] PALB [id number] MELLISSA   [id number] per Taz Veras MD   07/06/2019  10:07    HEMOGLOBIN A1C    Narrative:     add on MG [id number] PHOS [id number] TRSF [id number] PALB [id number] KARENGB   [id number] per Taz Veras MD   07/06/2019  10:07    TYPE & SCREEN     EKG Readings: (Independently Interpreted)   Normal sinus rhythm at a rate of 62. Nonspecific ST changes.      ECG Results          EKG 12-lead (In process)  Result time 07/06/19 10:18:07    In process by Interface, [facility] (07/06/19 10:18:07)                 Narrative:    Test Reason : Z01.810,    Vent. Rate : 060 BPM     Atrial Rate : 060 BPM     P-R Int : 188 ms          QRS Dur : 090 ms      QT Int : 394 ms       P-R-T Axes : 063 062 138 degrees     QTc Int : 394 ms    Normal sinus rhythm  Possible Left atrial enlargement  Nonspecific T wave abnormality  Abnormal ECG  When compared with ECG of 06-JUL-2019 07:59,  No significant change was found    Referred By: [email or web address]   SELF           Confirmed By:                              EKG 12-lead (In process)  Result time 07/06/19 10:17:06    In process by Interface, [facility] (07/06/19 10:17:06)                 Narrative:    Test Reason : E87.8,    Vent. Rate : 062 BPM     Atrial Rate : 062 BPM      P-R Int : 162 ms          QRS Dur : 088 ms      QT Int : 380 ms       P-R-T Axes : 073 071 149 degrees     QTc Int : 385 ms    Normal sinus rhythm  Nonspecific ST and T wave abnormality  Abnormal ECG  When compared with ECG of 25-JUN-2019 13:49,  Vent. rate has decreased BY  45 BPM  Nonspecific T wave abnormality, worse in Anterior leads  QT has shortened    Referred By: [email or web address]   SELF           Confirmed By:                             Imaging Results          X-Ray Femur 2 AP/LAT Right (Final result)  Result time 07/06/19 09:21:21    Final result by Sahara Key MD (07/06/19 09:21:21)                 Impression:      Osteopenia and vascular calcifications.  No acute fracture identified in the femur      Electronically signed by: Sahara Key MD  Date:    07/06/2019  Time:    09:21             Narrative:    EXAMINATION:  XR FEMUR 2 VIEW RIGHT    CLINICAL HISTORY:  Pain in leg, unspecified    TECHNIQUE:  AP and lateral views of the right femur were performed.    COMPARISON:  None    FINDINGS:  There is diffuse osteopenia and extensive soft tissue vascular calcifications.  No evidence of acute fracture, dislocation, or bone destruction involving the right femur is identified.                               X-Ray Knee 3 View Right (Final result)  Result time 07/06/19 09:18:47    Final result by Sahara Key MD (07/06/19 09:18:47)                 Impression:      Comminuted minimally displaced acute fractures of the proximal tibia and fibula as above.  Extension to the lateral tibial plateau is suspected.    Osteopenia.      Electronically signed by: Sahara Key MD  Date:    07/06/2019  Time:    09:18             Narrative:    EXAMINATION:  XR TIBIA FIBULA 2 VIEW RIGHT; XR KNEE 3 VIEW RIGHT    CLINICAL HISTORY:  Pain in leg, unspecified; Pain in unspecified knee    TECHNIQUE:  AP and lateral views of the right tibia and fibula and three views of the right knee were  performed.    COMPARISON:  None.    FINDINGS:  There is a comminuted minimally displaced acute fracture of the proximal tibial metaphysis.  This likely extends to involve the lateral tibial plateau.    Mildly displaced acute comminuted proximal right fibular fracture is also present    There osteopenia and soft tissue vascular calcifications.                               X-Ray Tibia Fibula 2 View Right (Final result)  Result time 07/06/19 09:18:47    Final result by Sahara Key MD (07/06/19 09:18:47)                 Impression:      Comminuted minimally displaced acute fractures of the proximal tibia and fibula as above.  Extension to the lateral tibial plateau is suspected.    Osteopenia.      Electronically signed by: Sahara Key MD  Date:    07/06/2019  Time:    09:18             Narrative:    EXAMINATION:  XR TIBIA FIBULA 2 VIEW RIGHT; XR KNEE 3 VIEW RIGHT    CLINICAL HISTORY:  Pain in leg, unspecified; Pain in unspecified knee    TECHNIQUE:  AP and lateral views of the right tibia and fibula and three views of the right knee were performed.    COMPARISON:  None.    FINDINGS:  There is a comminuted minimally displaced acute fracture of the proximal tibial metaphysis.  This likely extends to involve the lateral tibial plateau.    Mildly displaced acute comminuted proximal right fibular fracture is also present    There osteopenia and soft tissue vascular calcifications.                               X-Ray Ankle Complete Right (Final result)  Result time 07/06/19 09:22:17    Final result by Sahara Key MD (07/06/19 09:22:17)                 Impression:      No acute fracture.      Electronically signed by: Sahara Key MD  Date:    07/06/2019  Time:    09:22             Narrative:    EXAMINATION:  XR ANKLE COMPLETE 3 VIEW RIGHT    CLINICAL HISTORY:  Pain in unspecified ankle and joints of unspecified foot    TECHNIQUE:  AP, lateral, and oblique images of the right ankle were  performed.    COMPARISON:  None    FINDINGS:  There is no acute fracture, dislocation, or bone destruction.  Vascular calcifications are evident.  The ankle mortise is intact.  The talar dome has a normal contour.  There is calcaneal spurring and osteopenia.                               X-Ray Pelvis Routine AP (Final result)  Result time 07/06/19 09:20:37    Final result by Sahara Key MD (07/06/19 09:20:37)                 Impression:      Limited exam due to osteopenia and underpenetration.  No definite fracture is identified.  There is intact fixation hardware in the left proximal femur, unchanged from prior CT.      Electronically signed by: Sahara Key MD  Date:    07/06/2019  Time:    09:20             Narrative:    EXAMINATION:  XR PELVIS ROUTINE AP    CLINICAL HISTORY:  leg pain;    TECHNIQUE:  AP view of the pelvis was performed.    COMPARISON:  CT abdomen dated 03/03/2019    FINDINGS:  Exam is limited by underpenetration and osteopenia.  Fixation hardware is noted in the left femoral neck.  Hardware appears intact.  No definite acute fracture is identified though exam is markedly limited.  There is bilateral osteoarthritis of the hips.                               X-Ray Chest AP Portable (Final result)  Result time 07/06/19 09:18:51    Final result by Jewel Powell MD (07/06/19 09:18:51)                 Impression:      No acute abnormality.      Electronically signed by: Jewel Powell MD  Date:    07/06/2019  Time:    09:18             Narrative:    EXAMINATION:  XR CHEST AP PORTABLE    CLINICAL HISTORY:  weakness;    TECHNIQUE:  Single frontal view of the chest was performed.    COMPARISON:  Chest radiograph from 06/16/2019.    FINDINGS:  The lungs are clear, with normal appearance of pulmonary vasculature and no pleural effusion or pneumothorax.    The cardiac silhouette is normal in size. The hilar and mediastinal contours are unremarkable.    Bones are intact.                               X-Rays:   Independently Interpreted Readings:   Other Readings:  Tibial plateau fracture and proximal fibula fracture     Medical Decision Making:   History:   Old Medical Records: I decided to obtain old medical records.  Initial Assessment:   1. Injury to right knee. Concerned about tibial plateau fracture. Will image her right leg.   2. Clotted dialysis access. EKG is reassuring. She does not appear to be fluid overloaded. Will check labs including potassium.   Independently Interpreted Test(s):   I have ordered and independently interpreted X-rays - see prior notes.  I have ordered and independently interpreted EKG Reading(s) - see prior notes  Clinical Tests:   Lab Tests: Ordered and Reviewed  Radiological Study: Ordered and Reviewed  Medical Tests: Ordered and Reviewed  ED Management:  9:43 AM - Discussed with orthopedics. They will evaluate.   9:50 AM - Discussed with case management. Recommended placing in observation. I suspect she will be in hospital longer than one day, but case management recommend observation.   11:12 AM - D/W Nephrology - relayed clotted access and last dialysis on Wednesday  Other:   I have discussed this case with another health care provider.       <> Summary of the Discussion: Nephrology  Orthopedics            Scribe Attestation:   Scribe #1: I performed the above scribed service and the documentation accurately describes the services I performed. I attest to the accuracy of the note.               Clinical Impression:       ICD-10-CM ICD-9-CM   1. Closed fracture of proximal end of right tibia, unspecified fracture morphology, initial encounter S82.101A 823.00   2. Electrolyte abnormality E87.8 276.9   3. Leg pain M79.606 729.5   4. Knee pain, acute M25.569 719.46   5. Ankle pain M25.579 719.47   6. Preop cardiovascular exam Z01.810 V72.81         Disposition:   Disposition: Placed in Observation  Condition: Stable                        Taz Veras MD  07/06/19  1049       Taz Veras MD  07/06/19 111

## 2019-07-06 NOTE — CONSULTS
[facility]  Orthopedics  Consult Note    Patient Name: Jenni Todd  MRN: [id number]  Admission Date: 7/6/2019  Hospital Length of Stay: 0 days  Attending Provider: Taz Veras MD  Primary Care Provider: Michael Tan Iii, MD      Inpatient consult to Orthopedics  Consult performed by: Sunil Mccarty MD  Consult ordered by: Taz Veras MD        Subjective:     Principal Problem:Closed fracture of right tibial plateau    Chief Complaint:   Chief Complaint   Patient presents with    Knee Injury        HPI: Jenni Todd is a 48 y.o. female  with history of ESRD on HD MWF, HTN, hyperlipidemia, obesity, CAD, neuropathy, and type 2 diabetes who presents to the ED with a complaint of knee injury. Patient reports she was walking outside of her home when she missed a step and twisted her right knee and fell on her buttock. She now has swelling and pain to her right knee. She denies head pain or back pain. No LOC. She was on her way to the hospital for evaluation of clotted dialysis access. She states she started nocturnal dialysis 5 days ago and her access clotted 1 hr before her treatment was over. She has baseline neuropathy of bilateral LE causing her to have frequent falls. She fractured her left hip in past after a fall which was treated surgically on 9/13/18 by Dr Grullon. She takes Plavix at home (last dose 6am today). She walks with a walker at baseline. Her last dialysis was on Wednesday.    Past Medical History:   Diagnosis Date    Abnormal finding on Pap smear, HPV DNA positive 2014    Anemia associated with chronic renal failure     on Epogen    Blood type B+     Bulging discs - symptomatic      CAD (coronary artery disease)     Cardiomyopathy 3/13/2019    Diabetes mellitus, type 2     ESRD (end stage renal disease) 04/20/2004    FSGS (focal segmental glomerulosclerosis)     with collapsing    Hyperlipidemia     Hypertension 2004    Neuropathy     NSTEMI  (non-ST elevated myocardial infarction) 3/29/2019    Obesity     Secondary hyperparathyroidism, renal     TIA (transient ischemic attack)     Uterine fibroid     small uterine        Past Surgical History:   Procedure Laterality Date    ANGIOGRAM, CORONARY ARTERY N/A 4/15/2019    Performed by Roland Napier MD at [facility] CATH LAB    CARDIAC CATHETERIZATION      PCI x 2     SECTION, CLASSIC      COLONOSCOPY N/A 2018    Performed by Rodrigue Russell MD at [facility] ENDO (2ND FLR)    DEBRIDEMENT-WOUND Left 2016    Performed by [facility]ressa [facility]ox MD at [facility] OR    DIALYSIS FISTULA CREATION      multiple fistulas and grafts before PD     EGD (ESOPHAGOGASTRODUODENOSCOPY) N/A 3/14/2019    Performed by Adolph [facility]ila MD at [facility] ENDO    EGD (ESOPHAGOGASTRODUODENOSCOPY) N/A 3/13/2019    Performed by Adolph [facility]ila MD at [facility] ENDO    INCISION AND DRAINAGE (I&D), LABIAL N/A 2016    Performed by Harmony Fernandez MD at [facility] OR    INCISION AND DRAINAGE (I&D), LABIAL (ADD ON) Left 2016    Performed by Teressa Maddox MD at [facility] OR    INCISION AND DRAINAGE OF WOUND Left     VULVAR ABCESS WITH NECROSIS    Insertion, Catheter, Central Venous, Hemodialysis N/A 3/28/2019    Performed by Kimo Weeks MD at [facility] CATH LAB    Insertion, Catheter, Central Venous, Hemodialysis N/A 3/18/2019    Performed by Kimo Weeks MD at [facility] CATH LAB    INSERTION, CATHETER, TUNNELED ABORTED Left 3/14/2019    Performed by Servando Solis MD at [facility] OR    INSERTION, GRAFT, ARTERIOVENOUS, RIGHT ARM Right 3/22/2019    Performed by BESSIE Wynn III, MD at [facility] OR 2ND FLR    INSERTION, INTRA-AORTIC BALLOON PUMP  4/15/2019    Performed by Roland Napier MD at [facility] CATH LAB    Left heart cath Left 4/15/2019    Performed by Roland Napier MD at [facility] CATH LAB    ORIF, HIP Left 2018    Performed by Tevin Grullon MD at [facility] OR    PERITONEAL CATHETER INSERTION       PERMCATH REWIRE- TUNNELED CATH REWIRE Left 11/13/2017    Performed by Baldev [facility]roe MD at [facility] CATH LAB    PERMCATH REWIRE- TUNNELED CATH REWIRE N/A 10/5/2017    Performed by Baldev Munroe MD at [facility] CATH LAB    REMOVAL, CATHETER, DIALYSIS, PERITONEAL N/A 3/14/2019    Performed by [facility] MD at [facility] OR    REPLACEMENT, CATHETER, DIALYSIS, OVER GUIDEWIRE, USING EXISTING VENOUS ACCESS N/A 6/27/2019    Performed by [facility] at [facility] CATH LAB    REPLACEMENT, CATHETER, DIALYSIS, OVER GUIDEWIRE, USING EXISTING VENOUS ACCESS Left 5/24/2019    Performed by Baldev Munroe MD at [facility] CATH LAB    UMBILICAL HERNIA REPAIR      Venogram, possible intervention Right 3/20/2019    Performed by BESSIE Wynn III, MD at [facility] CATH LAB       Review of patient's allergies indicates:   Allergen Reactions    Flagyl [metronidazole hcl] Nausea And Vomiting    Clindamycin Diarrhea    Metronidazole        No current facility-administered medications for this encounter.      Current Outpatient Medications   Medication Sig    aspirin (ECOTRIN) 81 MG EC tablet Take 1 tablet (81 mg total) by mouth once daily.    atorvastatin (LIPITOR) 40 MG tablet Take 40 mg by mouth every evening.     clopidogrel (PLAVIX) 75 mg tablet Take 1 tablet (75 mg total) by mouth once daily.    ergocalciferol (ERGOCALCIFEROL) 50,000 unit Cap     famotidine (PEPCID) 20 MG tablet Take 1 tablet (20 mg total) by mouth nightly as needed for Heartburn.    ferrous sulfate 325 (65 FE) MG EC tablet Take 1 tablet (325 mg total) by mouth 2 (two) times daily. (Patient taking differently: Take 325 mg by mouth once daily. )    gabapentin (NEURONTIN) 300 MG capsule Take 1 capsule (300 mg total) by mouth 2 (two) times daily.    levoFLOXacin (LEVAQUIN) 250 MG tablet Take 1 tablet (250 mg total) by mouth once daily.    magnesium oxide (MAG-OX) 400 mg (241.3 mg magnesium) tablet Take 1 tablet (400 mg total) by mouth 3 (three) times daily.  "(Patient taking differently: Take 400 mg by mouth once daily. )    midodrine (PROAMATINE) 10 MG tablet One to two tab po q 8 hours. May take dose during dialysis if bp drops below SBP 90. (Patient taking differently: 20 mg. One to two tab po q 8 hours. May take dose during dialysis if bp drops below SBP 90.)    sevelamer carbonate (RENVELA) 800 mg Tab Take 1 tablet (800 mg total) by mouth 3 (three) times daily with meals.    vitamin renal formula, B-complex-vitamin c-folic acid, (B COMPLEX-C-FOLIC ACID) 1 mg Cap Take 1 capsule by mouth once daily. 1 Capsule Oral Every day    insulin aspart U-100 (NOVOLOG) 100 unit/mL (3 mL) InPn pen Inject 0-5 Units into the skin before meals and at bedtime as needed (Hyperglycemia).    mupirocin (BACTROBAN) 2 % ointment Apply topically 2 (two) times daily.    ondansetron (ZOFRAN-ODT) 4 MG TbDL Take 2 tablets (8 mg total) by mouth every 8 (eight) hours as needed.    ONETOUCH VERIO Strp USE 1 STRIP ONCE DAILY IN VITRO    pen needle, diabetic 31 gauge x 3/16" Ndle 1 each by Misc.(Non-Drug; Combo Route) route 4 (four) times daily before meals and nightly.     Family History     Problem Relation (Age of Onset)    Cancer Maternal Grandmother, Paternal Grandfather    Diabetes Maternal Aunt, Paternal Aunt        Tobacco Use    Smoking status: Never Smoker    Smokeless tobacco: Never Used   Substance and Sexual Activity    Alcohol use: No     Comment: Pt reports some social use of about 1-2 drinks about every six months.    Drug use: No    Sexual activity: Not Currently     Partners: Male     Birth control/protection: None     ROS   Per primary team note   Objective:     Vital Signs (Most Recent):  Temp: 98.5 °F (36.9 °C) (07/06/19 0955)  Pulse: 60 (07/06/19 1000)  Resp: 19 (07/06/19 1000)  BP: 120/74 (07/06/19 1000)  SpO2: 100 % (07/06/19 1000) Vital Signs (24h Range):  Temp:  [98.3 °F (36.8 °C)-98.5 °F (36.9 °C)] 98.5 °F (36.9 °C)  Pulse:  [58-70] 60  Resp:  [13-20] 19  SpO2: "  [98 %-100 %] 100 %  BP: (120-146)/(52-77) 120/74           Ortho/SPM Exam   RLE:  Skin intact throughout, no scars present  moderate swelling inferior to the knee anteriorly  No ecchymosis, erythema, or signs of cellulitis  TTP inferior and anterior knee  No joint line tenderness  No tenderness to palpation of proximal, middle, or distal aspects of femur or fibula  No tenderness to palpation of foot  ROM at knee limited due to pain  Full painless ROM of hip and ankle  Compartments soft  SILT Sa/Collins/DP/SP/T  Motor intact EHL/FHL/TA/Gastroc  dopplerable PT/DP  Brisk capillary refill       Significant Labs:   CBC:   Recent Labs   Lab 07/06/19  0824   WBC 9.72   HGB 9.3*   HCT 30.6*        CMP:   Recent Labs   Lab 07/06/19  0824   *   K 4.1   CL 93*   CO2 22*   *   BUN 16   CREATININE 5.9*   CALCIUM 10.8*   PROT 8.6*   ALBUMIN 3.3*   BILITOT 0.4   ALKPHOS 89   AST 41*   ALT 20   ANIONGAP 16   EGFRNONAA 7.8*       Significant Imaging: I have reviewed all pertinent imaging results/findings.   Xray tib/fib/knee/femur/ankle showing schatzker 6 tibial plateau fracture    Assessment/Plan:     * Closed fracture of right tibial plateau  Jenni Todd is a 48 y.o. female with closed Schatzker 6 tibial plateau fracture  - splinted in ER  - marked, booked, consented for external fixation of her fracture today  - She has been NPO since midnight  - nephrology has seen and cleared her for surgery, without need for dialysis before. They will follow and manage dialysis while inpatient  - pain control and home meds per primary team ([facility])    Dispo: To OR today for ex-fix. Definitive surgery at later date. Consents obtained for both surgeries.             Sunil Mccarty MD  Orthopedics  [facility]

## 2019-07-06 NOTE — TRANSFER OF CARE
Anesthesia Transfer of Care Note    Patient: Jenni Todd    Procedure(s) Performed: Procedure(s) (LRB):  APPLICATION, EXTERNAL FIXATION DEVICE, LARGE, TIBIA- SYNTHES (Right)    Patient location: PACU    Anesthesia Type: general    Transport from OR: Transported from OR on 6-10 L/min O2 by face mask with adequate spontaneous ventilation    Post pain: adequate analgesia    Post assessment: no apparent anesthetic complications and tolerated procedure well    Post vital signs: stable    Level of consciousness: awake, alert and oriented    Nausea/Vomiting: no nausea/vomiting    Complications: none    Transfer of care protocol was followed      Last vitals:   Visit Vitals  BP (!) 191/81   Pulse 61   Temp 36.1 °C (97 °F) (Temporal)   Resp 16   LMP 01/28/2014 (Approximate)   SpO2 99%   Breastfeeding? No

## 2019-07-06 NOTE — SUBJECTIVE & OBJECTIVE
Past Medical History:   Diagnosis Date    Abnormal finding on Pap smear, HPV DNA positive     Anemia associated with chronic renal failure     on Epogen    Blood type B+     Bulging discs - symptomatic      CAD (coronary artery disease)     Cardiomyopathy 3/13/2019    Diabetes mellitus, type 2     ESRD (end stage renal disease) 2004    FSGS (focal segmental glomerulosclerosis)     with collapsing    Hyperlipidemia     Hypertension     Neuropathy     NSTEMI (non-ST elevated myocardial infarction) 3/29/2019    Obesity     Secondary hyperparathyroidism, renal     TIA (transient ischemic attack)     Uterine fibroid     small uterine        Past Surgical History:   Procedure Laterality Date    ANGIOGRAM, CORONARY ARTERY N/A 4/15/2019    Performed by Roland [facility]ier MD at [facility] CATH LAB    CARDIAC CATHETERIZATION      PCI x 2     SECTION, CLASSIC      COLONOSCOPY N/A 2018    Performed by Rodrigue Russell MD at [facility] ENDO (2ND FLR)    DEBRIDEMENT-WOUND Left 2016    Performed by [facility]a [facility]ox MD at [facility] OR    DIALYSIS FISTULA CREATION      multiple fistulas and grafts before PD     EGD (ESOPHAGOGASTRODUODENOSCOPY) N/A 3/14/2019    [facility] by [facility] at [facility] ENDO    EGD (ESOPHAGOGASTRODUODENOSCOPY) N/A 3/13/2019    Performed by Adolph [facility]ila MD at [facility] ENDO    INCISION AND DRAINAGE (I&D), LABIAL N/A 2016    Performed by Harmony Fernandez MD at [facility] OR    INCISION AND DRAINAGE (I&D), LABIAL (ADD ON) Left 2016    Performed by Teressa Maddox MD at [facility] OR    INCISION AND DRAINAGE OF WOUND Left     VULVAR ABCESS WITH NECROSIS    Insertion, Catheter, Central Venous, Hemodialysis N/A 3/28/2019    Performed by Kimo Weeks MD at [facility] CATH LAB    Insertion, Catheter, Central Venous, Hemodialysis N/A 3/18/2019    Performed by Kimo Weeks MD at [facility] CATH LAB    INSERTION, CATHETER, TUNNELED ABORTED Left 3/14/2019     Performed by Servando Solis MD at [facility] OR    INSERTION, GRAFT, ARTERIOVENOUS, RIGHT ARM Right 3/22/2019    Performed by BESSIE Wynn III, MD at [facility] OR 2ND FLR    INSERTION, INTRA-AORTIC BALLOON PUMP  4/15/2019    Performed by Roalnd Napier MD at [facility] CATH LAB    Left heart cath Left 4/15/2019    Performed by Roland Napier MD at [facility] CATH LAB    ORIF, HIP Left 9/13/2018    Performed by Tevin [facility]on MD at [facility] OR    PERITONEAL CATHETER INSERTION      PERMCATH REWIRE- TUNNELED CATH REWIRE Left 11/13/2017    Performed by Baldev Munroe MD at [facility] CATH LAB    PERMCATH REWIRE- TUNNELED CATH REWIRE N/A 10/5/2017    Performed by Baldev Munroe MD at [facility] CATH LAB    REMOVAL, CATHETER, DIALYSIS, PERITONEAL N/A 3/14/2019    Performed by Servando Solis MD at [facility] OR    REPLACEMENT, CATHETER, DIALYSIS, OVER GUIDEWIRE, USING EXISTING VENOUS ACCESS N/A 6/27/2019    Performed by Kimo Weeks MD at [facility] CATH LAB    REPLACEMENT, CATHETER, DIALYSIS, OVER GUIDEWIRE, USING EXISTING VENOUS ACCESS Left 5/24/2019    Performed by Baldev Munroe MD at [facility] CATH LAB    UMBILICAL HERNIA REPAIR      Venogram, possible intervention Right 3/20/2019    Performed by BESSIE Wynn III, MD at [facility] CATH LAB       Review of patient's allergies indicates:   Allergen Reactions    Flagyl [metronidazole hcl] Nausea And Vomiting    Clindamycin Diarrhea    Metronidazole      No current facility-administered medications for this encounter.      Current Outpatient Medications   Medication    aspirin (ECOTRIN) 81 MG EC tablet    atorvastatin (LIPITOR) 40 MG tablet    clopidogrel (PLAVIX) 75 mg tablet    ergocalciferol (ERGOCALCIFEROL) 50,000 unit Cap    famotidine (PEPCID) 20 MG tablet    ferrous sulfate 325 (65 FE) MG EC tablet    gabapentin (NEURONTIN) 300 MG capsule    levoFLOXacin (LEVAQUIN) 250 MG tablet    magnesium oxide (MAG-OX) 400 mg (241.3 mg magnesium) tablet    midodrine  "(PROAMATINE) 10 MG tablet    sevelamer carbonate (RENVELA) 800 mg Tab    vitamin renal formula, B-complex-vitamin c-folic acid, (B COMPLEX-C-FOLIC ACID) 1 mg Cap    insulin aspart U-100 (NOVOLOG) 100 unit/mL (3 mL) InPn pen    mupirocin (BACTROBAN) 2 % ointment    ondansetron (ZOFRAN-ODT) 4 MG TbDL    ONETOUCH VERIO Strp    pen needle, diabetic 31 gauge x 3/16" Ndle     Family History     Problem Relation (Age of Onset)    Cancer Maternal Grandmother, Paternal Grandfather    Diabetes Maternal Aunt, Paternal Aunt        Tobacco Use    Smoking status: Never Smoker    Smokeless tobacco: Never Used   Substance and Sexual Activity    Alcohol use: No     Comment: Pt reports some social use of about 1-2 drinks about every six months.    Drug use: No    Sexual activity: Not Currently     Partners: Male     Birth control/protection: None     Review of Systems   Constitutional: Negative for activity change, chills and fever.   HENT: Negative for trouble swallowing.    Eyes: Negative for photophobia and visual disturbance.   Respiratory: Negative for cough, chest tightness, shortness of breath and wheezing.    Cardiovascular: Positive for leg swelling. Negative for chest pain and palpitations.   Gastrointestinal: Positive for abdominal distention. Negative for abdominal pain, constipation, diarrhea, nausea and vomiting.   Genitourinary: Positive for decreased urine volume.   Musculoskeletal: Positive for back pain. Negative for neck pain and neck stiffness.   Neurological: Negative for dizziness, facial asymmetry, weakness and light-headedness.   Psychiatric/Behavioral: Negative for agitation and confusion. The patient is not nervous/anxious.      Objective:     Vital Signs (Most Recent):  Temp: 98.5 °F (36.9 °C) (07/06/19 0955)  Pulse: (!) 56 (07/06/19 1102)  Resp: 17 (07/06/19 1102)  BP: 125/68 (07/06/19 1102)  SpO2: 100 % (07/06/19 1102) Vital Signs (24h Range):  Temp:  [98.3 °F (36.8 °C)-98.5 °F (36.9 °C)] 98.5 " °F (36.9 °C)  Pulse:  [56-70] 56  Resp:  [13-20] 17  SpO2:  [98 %-100 %] 100 %  BP: (120-146)/(52-77) 125/68        There is no height or weight on file to calculate BMI.  There is no height or weight on file to calculate BSA.    No intake/output data recorded.    Physical Exam   Constitutional: She is oriented to person, place, and time. She appears well-developed. No distress.   HENT:   Head: Normocephalic and atraumatic.   Right Ear: External ear normal.   Left Ear: External ear normal.   Eyes: Conjunctivae and EOM are normal. Right eye exhibits no discharge. Left eye exhibits no discharge.   Neck: Normal range of motion. Neck supple.   Cardiovascular: Normal rate and regular rhythm. Exam reveals no gallop and no friction rub.   No murmur heard.  Pulmonary/Chest: Effort normal and breath sounds normal. No respiratory distress. She has no wheezes. She has no rales.   Abdominal: She exhibits distension. There is no tenderness.   Musculoskeletal: She exhibits no edema or deformity.   Neurological: She is alert and oriented to person, place, and time.   Skin: Skin is warm and dry. She is not diaphoretic.   R femoral tunneled catheter   Psychiatric: She has a normal mood and affect. Her behavior is normal.   Vitals reviewed.      Significant Labs:  CBC:   Recent Labs   Lab 07/06/19  0824   WBC 9.72   RBC 2.85*   HGB 9.3*   HCT 30.6*      *   MCH 32.6*   MCHC 30.4*     CMP:   Recent Labs   Lab 07/06/19  0824   *   CALCIUM 10.8*   ALBUMIN 3.3*   PROT 8.6*   *   K 4.1   CO2 22*   CL 93*   BUN 16   CREATININE 5.9*   ALKPHOS 89   ALT 20   AST 41*   BILITOT 0.4

## 2019-07-06 NOTE — HPI
"Ms. Jenni Todd is a 49 yo AAF well known to our service.  She is an ESRD patient with multiple failed vascular access who presents to the ED on 07/06 after suffering a fall at home.  She recently had her tunneled dialysis catheter exchanged on 06/27 to her R femoral vein, and she reports that this catheter has been working fine for the past several treatments, but when she presented to dialysis on Friday, the nurse was unable to aspirate or flush the line and she was informed to present to the hospital for further management.  While on her way to the hospital today, she missed a step and "twisted" her knee and fell.  X-ray of there tibia/fibula revealing an acute fracture of the Tibial Plateau and orthopedics was consulted for further evaluation, planning on external fixation today.  Nephrology has been consulted for ESRD management while in-patient.    She has recently switched dialysis centers, [facility] with her last dialysis treatment being on Wednesday prior to presentation.  She has been followed closely with Dr. Alonso for possible re-placement of her PD catheter, but this has been on hold pending evaluation by cardiology/hepatology.  She is scheduled for surgery on the 25th for PD catheter placement @ [facility] and is scheduled to see hepatology on the 10th of this Month.    "

## 2019-07-06 NOTE — ED NOTES
Pt placed on continuous cardiac and pulse ox monitoring with blood pressure to cycle every 30 minutes.  VS stable; NSR noted.  Bed locked in lowest position; side rails up and locked x 2; call light, bedside table, and personal belongings within reach.  Pt instructed to alert nurse for assistance before attempting to get out of bed; verbalizes understanding.  Pt medicated for pain,  will continue to monitor pt. Father sitting at bedside.

## 2019-07-06 NOTE — ANESTHESIA PREPROCEDURE EVALUATION
07/06/2019  Jenni Todd is a 48 y.o., female with complex PMH who presents after mechanical fall with R tibia closed fracture for ex-fix.     Pre-operative evaluation for Procedure(s) (LRB):  APPLICATION, EXTERNAL FIXATION DEVICE, LARGE, TIBIA- SYNTHES (Right)      Patient Active Problem List   Diagnosis    Neuropathy    ESRD (end stage renal disease) on PD ( initiated dialysis 04/20/2004)    FSGS (focal segmental glomerulosclerosis) biopsy proven with collapsing features    Anemia in chronic kidney disease, on chronic dialysis    Renovascular hypertension    Hyperlipidemia    Obesity    CAD (coronary artery disease)    Type 2 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 and hypertension    Awaiting organ transplant status    Bulging discs - symptomatic     Spinal stenosis of sacral and sacrococcygeal region    Hypertensive renal disease    Hypertension associated with diabetes    Hypoalbuminemia    Cerebral infarction due to embolism of middle cerebral artery    Gait abnormality    Chronic low back pain    DDD (degenerative disc disease), lumbar    PAD (peripheral artery disease)    ESRD (end stage renal disease) on dialysis    Gastrointestinal hemorrhage with hematemesis    Acute blood loss anemia    Peritonitis associated with peritoneal dialysis    Peritonitis due to infected peritoneal dialysis catheter    Abdominal pain    Occult GI bleeding    Intra-dialytic hypotension    Nausea and vomiting    Cardiomyopathy    Chronic combined systolic and diastolic heart failure    Acute gastric ulcer with hemorrhage    Antibiotic-associated diarrhea    Hemodialysis catheter dysfunction    Pressure injury of left buttock, stage 2    Delayed surgical wound healing    Other specified anemias    NSTEMI (non-ST elevated myocardial infarction)    Complication of  vascular access for dialysis    Multiple wounds    Pressure injury of sacral region, stage 2    3-vessel coronary artery disease    Problem with dialysis access    Permanent central venous catheter in place    Hypervolemia    Acute on chronic combined systolic and diastolic heart failure    Chronic right-sided heart failure    Moderate to severe pulmonary hypertension    Diabetic polyneuropathy associated with type 2 diabetes mellitus    At high risk for imbalanced nutrition    Upper respiratory infection    Rhinitis    Complication of vascular dialysis catheter    Dialysis patient    Ascites    Congestive heart failure    Problem with vascular access    Hypotension    Type 2 diabetes mellitus with hyperglycemia, with long-term current use of insulin    Closed fracture of right tibial plateau       Review of patient's allergies indicates:   Allergen Reactions    Flagyl [metronidazole hcl] Nausea And Vomiting    Clindamycin Diarrhea    Metronidazole        No current facility-administered medications on file prior to encounter.      Current Outpatient Medications on File Prior to Encounter   Medication Sig Dispense Refill    aspirin (ECOTRIN) 81 MG EC tablet Take 1 tablet (81 mg total) by mouth once daily. 30 tablet 12    atorvastatin (LIPITOR) 40 MG tablet Take 40 mg by mouth every evening.       clopidogrel (PLAVIX) 75 mg tablet Take 1 tablet (75 mg total) by mouth once daily. 30 tablet 11    ergocalciferol (ERGOCALCIFEROL) 50,000 unit Cap       famotidine (PEPCID) 20 MG tablet Take 1 tablet (20 mg total) by mouth nightly as needed for Heartburn. 60 tablet 0    ferrous sulfate 325 (65 FE) MG EC tablet Take 1 tablet (325 mg total) by mouth 2 (two) times daily. (Patient taking differently: Take 325 mg by mouth once daily. )  0    gabapentin (NEURONTIN) 300 MG capsule Take 1 capsule (300 mg total) by mouth 2 (two) times daily. 60 capsule 2    levoFLOXacin (LEVAQUIN) 250 MG tablet Take 1  "tablet (250 mg total) by mouth once daily. 10 tablet 0    magnesium oxide (MAG-OX) 400 mg (241.3 mg magnesium) tablet Take 1 tablet (400 mg total) by mouth 3 (three) times daily. (Patient taking differently: Take 400 mg by mouth once daily. )  0    midodrine (PROAMATINE) 10 MG tablet One to two tab po q 8 hours. May take dose during dialysis if bp drops below SBP 90. (Patient taking differently: 20 mg. One to two tab po q 8 hours. May take dose during dialysis if bp drops below SBP 90.) 200 tablet 3    sevelamer carbonate (RENVELA) 800 mg Tab Take 1 tablet (800 mg total) by mouth 3 (three) times daily with meals. 90 tablet 11    vitamin renal formula, B-complex-vitamin c-folic acid, (B COMPLEX-C-FOLIC ACID) 1 mg Cap Take 1 capsule by mouth once daily. 1 Capsule Oral Every day      insulin aspart U-100 (NOVOLOG) 100 unit/mL (3 mL) InPn pen Inject 0-5 Units into the skin before meals and at bedtime as needed (Hyperglycemia). 6 mL 2    mupirocin (BACTROBAN) 2 % ointment Apply topically 2 (two) times daily. 22 g 1    ondansetron (ZOFRAN-ODT) 4 MG TbDL Take 2 tablets (8 mg total) by mouth every 8 (eight) hours as needed. 12 tablet 0    ONETOUCH VERIO Strp USE 1 STRIP ONCE DAILY IN VITRO  3    pen needle, diabetic 31 gauge x 3/16" Ndle 1 each by Misc.(Non-Drug; Combo Route) route 4 (four) times daily before meals and nightly. 100 each 5     Past Medical History:   Diagnosis Date    Abnormal finding on Pap smear, HPV DNA positive 2014    Anemia associated with chronic renal failure     on Epogen    Blood type B+     Bulging discs - symptomatic      CAD (coronary artery disease)     Cardiomyopathy 3/13/2019    Diabetes mellitus, type 2     ESRD (end stage renal disease) 04/20/2004    FSGS (focal segmental glomerulosclerosis)     with collapsing    Hyperlipidemia     Hypertension 2004    Neuropathy     NSTEMI (non-ST elevated myocardial infarction) 3/29/2019    Obesity     Secondary " hyperparathyroidism, renal     TIA (transient ischemic attack)     Uterine fibroid     small uterine        Past Surgical History:   Procedure Laterality Date    ANGIOGRAM, CORONARY ARTERY N/A 4/15/2019    Performed by Roland [facility]ier MD at [facility] CATH LAB    CARDIAC CATHETERIZATION      PCI x 2     SECTION, CLASSIC      COLONOSCOPY N/A 2018    Performed by Rodrigue Russell MD at [facility] ENDO (2ND FLR)    DEBRIDEMENT-WOUND Left 2016    Performed by [facility]ressa Maddox MD at [facility] OR    DIALYSIS FISTULA CREATION      multiple fistulas and grafts before PD     EGD (ESOPHAGOGASTRODUODENOSCOPY) N/A 3/14/2019    Performed by Adolph Davila MD at [facility] ENDO    EGD (ESOPHAGOGASTRODUODENOSCOPY) N/A 3/13/2019    Performed by Adolph [facility]ila MD at [facility] ENDO    INCISION AND DRAINAGE (I&D), LABIAL N/A 2016    Performed by Hamrony Fernandez MD at [facility] OR    INCISION AND DRAINAGE (I&D), LABIAL (ADD ON) Left 2016    Performed by Teressa Maddox MD at [facility] OR    INCISION AND DRAINAGE OF WOUND Left     VULVAR ABCESS WITH NECROSIS    Insertion, Catheter, Central Venous, Hemodialysis N/A 3/28/2019    Performed by Kimo Weeks MD at [facility] CATH LAB    Insertion, Catheter, Central Venous, Hemodialysis N/A 3/18/2019    Performed by Kimo Weeks MD at [facility] CATH LAB    INSERTION, CATHETER, TUNNELED ABORTED Left 3/14/2019    Performed by Servando Solis MD at [facility] OR    INSERTION, GRAFT, ARTERIOVENOUS, RIGHT ARM Right 3/22/2019    Performed by BESSIE Wynn III, MD at [facility] OR 2ND FLR    INSERTION, INTRA-AORTIC BALLOON PUMP  4/15/2019    Performed by Roland Napier MD at [facility] CATH LAB    Left heart cath Left 4/15/2019    Performed by Roland Napier MD at [facility] CATH LAB    ORIF, HIP Left 2018    Performed by Tevin Grullon MD at [facility] OR    PERITONEAL CATHETER INSERTION      PERMCATH REWIRE- TUNNELED CATH REWIRE Left 2017    Performed by Baldev  MD Shaneka at [facility] CATH LAB    PERMCATH REWIRE- TUNNELED CATH REWIRE N/A 10/5/2017    Performed by Baldev Munroe MD at [facility] CATH LAB    REMOVAL, CATHETER, DIALYSIS, PERITONEAL N/A 3/14/2019    [facility] by [facility] at [facility] OR    REPLACEMENT, CATHETER, DIALYSIS, OVER GUIDEWIRE, USING EXISTING VENOUS ACCESS N/A 6/27/2019    Performed by [facility]o Weeks MD at [facility] CATH LAB    REPLACEMENT, CATHETER, DIALYSIS, OVER GUIDEWIRE, USING EXISTING VENOUS ACCESS Left 5/24/2019    Performed by Baldev Munroe MD at [facility] CATH LAB    UMBILICAL HERNIA REPAIR      Venogram, possible intervention Right 3/20/2019    Performed by BESSIE Wynn III, MD at [facility] CATH LAB       Social History     Socioeconomic History    Marital status: Single     Spouse name: Not on file    Number of children: Not on file    Years of education: Not on file    Highest education level: Not on file   Occupational History     Employer: [employer]    Financial resource strain: Not on file    Food insecurity:     Worry: Not on file     Inability: Not on file    Transportation needs:     Medical: Not on file     Non-medical: Not on file   Tobacco Use    Smoking status: Never Smoker    Smokeless tobacco: Never Used   Substance and Sexual Activity    Alcohol use: No     Comment: Pt reports some social use of about 1-2 drinks about every six months.    Drug use: No    Sexual activity: Not Currently     Partners: Male     Birth control/protection: None   Lifestyle    Physical activity:     Days per week: Not on file     Minutes per session: Not on file    Stress: Not on file   Relationships    Social connections:     Talks on phone: Not on file     Gets together: Not on file     Attends Confucianist service: Not on file     Active member of club or organization: Not on file     Attends meetings of clubs or organizations: Not on file     Relationship status: Not on file   Other Topics Concern    Not on  "file   Social History Narrative     [place]    Single    One child    History of blood transfusion in     Potential donor ??? brother         CBC:   Recent Labs     19  0824   WBC 9.72   RBC 2.85*   HGB 9.3*   HCT 30.6*      *   MCH 32.6*   MCHC 30.4*       CMP:   Recent Labs     19  0824   *   K 4.1   CL 93*   CO2 22*   BUN 16   CREATININE 5.9*   *   MG 2.2   PHOS 3.7   CALCIUM 10.8*   ALBUMIN 3.3*   PROT 8.6*   ALKPHOS 89   ALT 20   AST 41*   BILITOT 0.4       INR  Recent Labs     19  0824   INR 1.0   APTT 23.9           Diagnostic Studies:      EKD Echo:  Results for orders placed or performed during the hospital encounter of 16   2D echo with color flow doppler   Result Value Ref Range    QEF 60 55 - 65    Mitral Valve Regurgitation MILD     Diastolic Dysfunction No     Aortic Valve Stenosis TRIVIAL TO MILD      Last 3 sets of Vitals    Vitals - 1 value per visit 2019 7/3/2019 2019   SYSTOLIC 133 65 135   DIASTOLIC 72 45 78   PULSE 93 94 62   TEMPERATURE 98.3 - 98.5   RESPIRATIONS 20 - 15   SPO2 100 - 100   Weight (lb) 210.1 199.08 -   Weight (kg) 95.3 90.3 -   HEIGHT - 5' 7" -   BODY MASS INDEX - 31.18 -   VISIT REPORT - - -   Pain Score  - 0 -   Some recent data might be hidden       Anesthesia Evaluation    I have reviewed the Patient Summary Reports.     I have reviewed the Nursing Notes.   I have reviewed the Medications.     Review of Systems  Anesthesia Hx:  No problems with previous Anesthesia Denies Hx of Anesthetic complications  History of prior surgery of interest to airway management or planning: Previous anesthesia: General Airway issues documented on chart review include easy direct laryngoscopy  Denies Family Hx of Anesthesia complications.   Denies Personal Hx of Anesthesia complications.   Social:  Non-Smoker, No Alcohol Use    Hematology/Oncology:     Oncology Normal    -- Anemia: "   EENT/Dental:EENT/Dental Normal   Cardiovascular:   Exercise tolerance: poor Hypertension, well controlled Valvular problems/Murmurs (mod AS ARNOLD 1.5), AS Past MI CAD    Denies Angina. CHF (EF 25%) hyperlipidemia ECG has been reviewed. 4/2019 NSTEMI with LHV revealing severe 3-V CAD (LAD 99%, pCx 90%, %) referred for CABG but denied for poor surgical candidate and managed medically   Pulmonary:  Pulmonary Normal    Renal/:   Chronic Renal Disease, ESRD, Dialysis FSGS HD MWF, most recently W (3 days prior) because femoral dialysis catheter not functioning. Also with BUE nonfunctional fistulas.    Musculoskeletal:   Arthritis   Spine Disorders: Degenerative disease    Neurological:   TIA,   Peripheral Neuropathy    Endocrine:   Diabetes, well controlled        Physical Exam  General:  Well nourished, Obesity    Airway/Jaw/Neck:  Airway Findings: Mouth Opening: Normal Tongue: Normal  General Airway Assessment: Adult  Mallampati: II  TM Distance: Normal, at least 6 cm  Jaw/Neck Findings:  Micrognathia: Negative Limited Ability to Prognath  Neck ROM: Normal ROM  Neck Findings:  Girth Increased      Dental:  Dental Findings: In tact   Chest/Lungs:  Chest/Lungs Findings: Clear to auscultation, Normal Respiratory Rate     Heart/Vascular:  Heart Findings: Rate: Normal  Rhythm: Regular Rhythm  Sounds: Normal     Abdomen:  Abdomen Findings:  Normal     Musculoskeletal:  Musculoskeletal Findings:    Skin:  Skin Findings:     Mental Status:  Mental Status Findings:  Alert and Oriented, Cooperative         Anesthesia Plan  Type of Anesthesia, risks & benefits discussed:  Anesthesia Type:  general  Patient's Preference:   Intra-op Monitoring Plan: standard ASA monitors  Intra-op Monitoring Plan Comments:   Post Op Pain Control Plan: multimodal analgesia, IV/PO Opioids PRN and per primary service following discharge from PACU  Post Op Pain Control Plan Comments:   Induction:   IV  Beta Blocker:  Patient is not currently on  a Beta-Blocker (No further documentation required).       Informed Consent: Patient understands risks and agrees with Anesthesia plan.  Questions answered. Anesthesia consent signed with patient.  ASA Score: 4     Day of Surgery Review of History & Physical:    H&P update referred to the surgeon.     Anesthesia Plan Notes: Plan GETA. Surgeons request paralysis for fracture reduction. Poss a-line PRN, will have available. Likely will place better IV access post-induction given complex cardiac hx. Discussed that she is at increased risk for cardiac complications given history of untreated 3V CAD with ischemic CM, ESRD, recently missed HD session due to nonfunctional access. Patient states understanding and accepts these risks. All questions answered. Informed consent obtained. Pt agrees to proceed.           Ready For Surgery From Anesthesia Perspective.

## 2019-07-06 NOTE — BRIEF OP NOTE
[facility]  Brief Operative Note     SUMMARY     Surgery Date: 7/6/2019     Surgeon(s) and Role:     * Corwin Lopez MD - Primary     * Saroj Tena MD - Resident - Assisting     * Kev Brewer MD - Resident - Assisting     * Sunil Mccarty MD - Resident - Assisting     * Kev Casas MD - Resident - Assisting        Pre-op Diagnosis:  Closed fracture of proximal end of right tibia, unspecified fracture morphology, initial encounter [S82.101A]    Post-op Diagnosis:  Post-Op Diagnosis Codes:     * Closed fracture of proximal end of right tibia, unspecified fracture morphology, initial encounter [S82.101A]    Procedure(s) (LRB):  APPLICATION, EXTERNAL FIXATION DEVICE, LARGE, TIBIA- SYNTHES (Right)    Anesthesia: General    Description of the findings of the procedure: as above    Findings/Key Components: as above    Estimated Blood Loss: * No values recorded between 7/6/2019  2:29 PM and 7/6/2019  3:10 PM *         Specimens:   Specimen (12h ago, onward)    None

## 2019-07-07 NOTE — PROGRESS NOTES
[facility] Hospital Medicine  Progress Note    Patient Name: Jenni Todd  MRN: [id number]  Patient Class: OP- Observation   Admission Date: 7/6/2019  Length of Stay: 0 days  Attending Physician: Lilia Zurita MD  Primary Care Provider: Michael Tan Iii, MD    [facility] Medicine Team: [facility] HOSP MED 2 Kev Villegas MD    Subjective:     Principal Problem:Closed fracture of right tibial plateau      HPI:  Patient is a 49 yo F with PMH of ESRD (on HD MWF), DM2, neuropathy, HTN, HLD, CAD, obesity, bulging discs in back who presented to the ED via ambulance following a fall. Patient was walking down the steps in front of her house and fell down and twisted her R leg. She described her leg pain as a 10/10. Last night, while at dialysis, her nurse could not aspirate her R femoral line, and she was instructed to come to the ER due to a clot in her line. She was on her way out of her house to come to the ER when she fell. At baseline, she uses a walker to get around due to her neuropathy in her feet and has a history of falls. In 2018, she fractured her hip and was operated on by Dr. Grullon.    She has had an extensive history for recurrent vascular access problems. She was on peritoneal dialysis for 14 years prior to February 2019, at which point she developed peritonitis, was admitted, and had her catheter removed and switched to HD. 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 Her last dialysis was on Wednesday, 07/03. She plans for a peritoneal dialysis catheter placement on July 25th with Dr. Tucker at [facility].    In the ED, XRs of her pelvis, knee, femur, tibia, and ankle of her R leg were ordered. R tibia XR revealed a tibial fracture and signs of osteopenia. Ortho and Nephrology were consulted. Her ASA and plavix were held, she was splinted, and Ortho planned for an ex-fix today, which was cleared by  "Nephrology.           Overview/Hospital Course:  Patient was admitted to the floor on 07/06 with a R tibial fracture and a clotted R femoral access line. Orthopedic Surgery and Nephrology were consulted from the ED. Ortho operated on the patient on 07/06 and did an ex-fix with plans for definitive correction surgery at a later date. On 07/07, Nephrology flushed alteplase into her access line, and she is scheduled for dialysis treatment. Her pain is well-controlled with Tylenol and Oxycodone with Dilaudid available for breakthrough pain. Her home medications were resumed upon admission, except her midodrine was held prior to surgery due to concerns for falling Bps.    Review of Systems   Constitutional: Negative for activity change, appetite change, chills and fever.   HENT: Negative for sore throat and trouble swallowing.    Respiratory: Negative for cough, choking and shortness of breath.    Cardiovascular: Negative for chest pain, palpitations and leg swelling.   Gastrointestinal: Positive for abdominal distention. Negative for abdominal pain and vomiting.   Genitourinary: Negative for difficulty urinating.   Musculoskeletal: Negative for arthralgias and back pain.        Diffuse R leg pain at 10/10. Patient also complaining that her R leg has "fallen asleep."   Skin: Negative for rash and wound.   Neurological: Negative for dizziness, light-headedness and headaches.   Psychiatric/Behavioral: Negative for agitation.     Objective:     Vital Signs (Most Recent):  Temp: 98.1 °F (36.7 °C) (07/07/19 1112)  Pulse: 75 (07/07/19 1112)  Resp: 20 (07/07/19 1112)  BP: (!) 142/62 (07/07/19 1112)  SpO2: 100 % (07/07/19 1112) Vital Signs (24h Range):  Temp:  [95.9 °F (35.5 °C)-98.1 °F (36.7 °C)] 98.1 °F (36.7 °C)  Pulse:  [60-93] 75  Resp:  [14-20] 20  SpO2:  [94 %-100 %] 100 %  BP: (103-191)/(54-81) 142/62     Weight: 90.3 kg (199 lb 1.2 oz)  Body mass index is 31.18 kg/m².    Physical Exam   Constitutional: She is oriented " to person, place, and time. She appears well-developed and well-nourished. No distress.   HENT:   Head: Normocephalic and atraumatic.   Eyes: Conjunctivae and EOM are normal.   Neck: Normal range of motion. Neck supple.   Cardiovascular: Normal rate, regular rhythm and normal heart sounds.   No murmur heard.  Pulmonary/Chest: Effort normal and breath sounds normal. No respiratory distress.   Abdominal: Bowel sounds are normal. She exhibits distension. There is no tenderness. There is no rebound.   Musculoskeletal: She exhibits no edema.   Ex-fix and bandages in place on R leg  Distal pulses intact bilaterally   Neurological: She is alert and oriented to person, place, and time. No sensory deficit.   Skin: Skin is warm and dry.   Psychiatric: She has a normal mood and affect.         CRANIAL NERVES     CN III, IV, VI   Extraocular motions are normal.        Significant Labs: All pertinent labs within the past 24 hours have been reviewed.    Significant Imaging: I have reviewed all pertinent imaging results/findings within the past 24 hours.      Assessment/Plan:      * Closed fracture of right tibial plateau  -07/06, XR showing tibial fracture  -Ortho consulted and following  -Ortho did ex-fix on 07/06 with plans for definitive surgery at a later date  -PT/OT consulted  -Pain control via tylenol and oxycodone with dilaudid available for breakthrough      ESRD (end stage renal disease) on dialysis  -reports a clot in vascular access line noticed at dialysis on 07/05  -Neprhology consulted and following  -Neprhology flushed line with alteplase on 07/07  -plans for HD on 07/07  -renal vitamins    Bulging discs - symptomatic   -gabapentin 300 BID      CAD (coronary artery disease)  -holding home ASA and plavix      Hyperlipidemia  -restart home lipitor 40      Anemia in chronic kidney disease, on chronic dialysis  -Iron 325 mg BID      Neuropathy  -History of falls 2/2 lower extremity neuropathy.  -Patient fell while  walking down the steps outside of her house  -Resume home gabapentin 300 BID        VTE Risk Mitigation (From admission, onward)        Ordered     IP VTE HIGH RISK PATIENT  Once      07/06/19 1458     Place sequential compression device  Until discontinued      07/06/19 3072                Kev Villegas MD  Department of Hospital Medicine   [facility]

## 2019-07-07 NOTE — ASSESSMENT & PLAN NOTE
-07/06, XR showing tibial fracture  -Ortho consulted and following  -Ortho did ex-fix on 07/06 with plans for definitive surgery at a later date  -PT/OT consulted  -Pain control via tylenol and oxycodone with dilaudid available for breakthrough

## 2019-07-07 NOTE — PLAN OF CARE
Problem: Physical Therapy Goal  Goal: Physical Therapy Goal  Goals to be met by: 19    Patient will increase functional independence with mobility by performin. Supine to sit with Moderate Assistance -not met  2. Sit to supine with Moderate Assistance -not met  3. Sit to stand transfer with Moderate Assistance with RW -not met  4. Bed to chair transfer with Minimal Assistance using Slideboard -not met    Evaluation completed and goals appropriate. Meghan Rueda, PT 2019

## 2019-07-07 NOTE — HOSPITAL COURSE
Patient was admitted to the floor on 07/06 with a R tibial fracture and a clotted R femoral access line. Orthopedic Surgery and Nephrology were consulted from the ED. Ortho operated on the patient on 07/06 and did an ex-fix with plans for definitive correction surgery at a later date. On 07/07, Nephrology flushed alteplase into her access line, and she is scheduled for dialysis treatment. Her pain is well-controlled with Tylenol and Oxycodone with Dilaudid available for breakthrough pain. Her home medications were resumed upon admission, except her midodrine was held prior to surgery due to concerns for falling BPs. On 07/08, patient underwent HD and 1L of fluid was removed. On 07/09, Ortho evaluated the swelling of her R knee, and decided to not operate. On 07/10, patient went to HD at which 1.5L of fluid was removed. Her BP dropped to 81/56 following dialysis, and a dose of midodrine 10 mg was ordered. 07/11, patient underwent definitive surgery with Ortho. She tolerated the procedure well and her pain is well-controlled with a ropivacaine nerve block as well as Oxycodone PRN. On 07/12, patient underwent hemodialysis at which 2L was removed over 3.5 hours, and she tolerated it well. PT and OT were consulted for post-operative evaluation.  Patient endorsed inability to move her right toes and ankle with intact pulses and sensation. Anesthesia was informed, and her Ropivacaine nerve block was temporarily discontinued. Her right toes and ankle were reassesed following discontinuing her nerve block, and she was noted to have improved motor and sensory function. Anesthesia resumed her nerve block following examination. On 07/15, patient underwent dialysis, and did not tolerate it well. Only 0.5 L were pulled off out of the planned 1 L due to the patient developing symptomatic hypotension. A CXR was obtained which showed no acute processes. On 07/16, her blood pressures still remained low so her Midodrine was changed to  15 mg TID, with 10 mg Midodrine PRN to if systolic blood pressure <90. Also, midodrine 20 mg PRN 30 minutes prior to HD was ordered. Her nerve block was discontinued indefinitely and her motor and sensory function of her RLE improved. OT and PT worked with her and recommended SNF placement upon discharge. She underwent dialysis for 3.5h and had 0.5L removed with stable vital signs throughout. Patient refused heparin 5k sq for prophylactic VTE - she was counseled extensively about its risks and benefits, however, has continued to refuse throughout her admission. 07/17, patient developed lightheadedness when trying to sit up. Her blood pressure was checked and found to be 75/45, and she was given midodrine 15 mg with good response. She also started to complain of bilateral leg pain on interview in the morning. She has been refusing heparin due to a past experience with a GI bleed throughout this admission. US bilateral lower extremity was ordered which revealed acute thrombi in her bilateral illiac and femoral veins. She was agreeable to start a heparin infusion for treatment, and a consult was placed to Vascular Surgery for evaluation of IVC filter placement. Her Hb was 7 on morning labs, a decrease from 9.7 from 3 days prior. She was transfused 1 unit pRBC. On 07/18, vascular surgery recommended against IVC filter placement, instead opting for heparin therapeutic drip. Also, she developed some altered mental status and was more slow in her responses, so a stat CT Head was obtained, which showed no acute processes. Her apixaban and ASA were continued to be held in the setting of heparin drip, as well as PMH of GI bleed. While in the room for morning rounds, her blood pressure was 64/49, and her mentation was slowed, so she was given a 500 mL NS bolus, and critical care was consulted. Her blood pressures responded well to fluid resuscitation. Critical Care was consulted who evaluated the patient and recommended  against ICU placement. Per Nephrology and Critical Care recommendations, she was moved to a step-down unit which is capable of CRRT in case of volume overload due to fluid bolus while missing her scheduled dialysis. Blood cultures were obtained x2 as well as a culture from her line. She was started on IV antibiotics (vancomycin and pip-tazo) for concerns of sepsis secondary to CLAPSI.     7/19: Patient was complaining of chest pain, pressure like, and SOB. Her BP dropped despite getting 500cc NS over night. BP in 83/52. placed on nonrebreather as her O2 sat dropped to 85% and was worsening subjectively when she was lying flat. ECG showed ST elevations in lead 2, 3 and AVF. Troponin 7.2 , ABG WNL. Lactate similar to yesterday at 2.2 initially then trended up to 6.  CXR and Abd xray ordered once patient is stable. ICU and Cardiology were consulted and patient was transferred to ICU when IABP was placed as well as central line. ETT was placed and connected to ventilator. Levophed was started to control her low BP

## 2019-07-07 NOTE — PT/OT/SLP EVAL
Physical Therapy Evaluation and treatment    Patient Name:  Jenni Todd   MRN:  [id number]    Recommendations:     Discharge Recommendations:  nursing facility, skilled   Discharge Equipment Recommendations: (will determine DME Needs closer to discharge)   Barriers to discharge: Inaccessible home and Decreased caregiver support pt has slab entrance and no physical assist after discharge    Assessment:     Jenni Todd is a 48 y.o. female admitted with a medical diagnosis of Closed fracture of right tibial plateau.  She presents with the following impairments/functional limitations:  impaired endurance, impaired balance, impaired functional mobilty, decreased lower extremity function pt senait treatment fair but is very deconditioned and needed max x 2 assist for mobility. Pt will benefit from cont skilled PT 5x/wk to progress physically. Pt will need long term SNF placement 2nd to not being able to WB on RLE . Pt will not be ambulator and will need to be modified Independent from w/c level.  Pt is s/p external fixation of tibial plateau fx 7/6/19.    Rehab Prognosis: Good; patient would benefit from acute skilled PT services to address these deficits and reach maximum level of function.    Recent Surgery: Procedure(s) (LRB):  APPLICATION, EXTERNAL FIXATION DEVICE, LARGE, TIBIA- SYNTHES (Right) 1 Day Post-Op    Plan:     During this hospitalization, patient to be seen 5 x/week to address the identified rehab impairments via therapeutic activities and progress toward the following goals:    · Plan of Care Expires:  08/05/19    Subjective     Chief Complaint: pt c/o pain and being weak during treatment.   Patient/Family Comments/goals: to get better and go home.  Pain/Comfort:  · Pain Rating 1: 8/10  · Location - Side 1: Left  · Location 1: (leg)  · Pain Rating Post-Intervention 1: 8/10    Patients cultural, spiritual, Uatsdin conflicts given the current situation: no    Living Environment:  Pt lives with her  20 yo step daughter who will not be able to physically assist.  They live in 1 story [facility].  Prior to admission, patients level of function was modified Independent. Pt used rollator in house and w/c in community..  Equipment used at home: rollator, wheelchair, bedside commode(built in bench in shower stall).  DME owned (not currently used): none.  Upon discharge, patient will have assistance from no physical assist is available. .    Objective:     Communicated with nurse prior to session.  Patient found supine with telemetry(hep lock IV, external fixator)  upon PT entry to room.    General Precautions: Standard, fall   Orthopedic Precautions:RLE non weight bearing   Braces:       Exams:  · Cognitive Exam:  Patient is oriented to Person, Place, Time and Situation  · RLE ROM: not tested 2nd to surgery  · RLE Strength: not tested 2nd to surgery  · LLE ROM: WFL  · LLE Strength: 3/5 for major muscle groups    Functional Mobility:  · Bed Mobility:   Pt needed verbal cues for hand placement and sequencing for functional mobility.   · Rolling Right: maximal assistance and of 2 persons  · Supine to Sit: maximal assistance and of 2 persons  · Sit to Supine: maximal assistance and of 2 persons  ·   · Transfers:     · Sit to Stand:  total assistance and of 2 persons with rolling walker. Pt was not able to lift buttocks off of bed with transfer.  · Balance: pt sat on EOB with SBA.        AM-PAC 6 CLICK MOBILITY  Total Score:8     Patient left supine with all lines intact and call button in reach.    GOALS:   Multidisciplinary Problems     Physical Therapy Goals        Problem: Physical Therapy Goal    Goal Priority Disciplines Outcome Goal Variances Interventions   Physical Therapy Goal     PT, PT/OT      Description:  Goals to be met by: 19    Patient will increase functional independence with mobility by performin. Supine to sit with Moderate Assistance -not met  2. Sit to supine with Moderate Assistance -not  met  3. Sit to stand transfer with Moderate Assistance with RW -not met  4. Bed to chair transfer with Minimal Assistance using Slideboard -not met                      History:     Past Medical History:   Diagnosis Date    Abnormal finding on Pap smear, HPV DNA positive     Anemia associated with chronic renal failure     on Epogen    Blood type B+     Bulging discs - symptomatic      CAD (coronary artery disease)     Cardiomyopathy 3/13/2019    Diabetes mellitus, type 2     ESRD (end stage renal disease) 2004    FSGS (focal segmental glomerulosclerosis)     with collapsing    Hyperlipidemia     Hypertension     Neuropathy     NSTEMI (non-ST elevated myocardial infarction) 3/29/2019    Obesity     Secondary hyperparathyroidism, renal     TIA (transient ischemic attack)     Uterine fibroid     small uterine        Past Surgical History:   Procedure Laterality Date    ANGIOGRAM, CORONARY ARTERY N/A 4/15/2019    Performed by [facility] at [facility] CATH LAB    CARDIAC CATHETERIZATION      PCI x 2     SECTION, CLASSIC      COLONOSCOPY N/A 2018    Performed by Rodrigue Russell MD at [facility] ENDO (2ND FLR)    DEBRIDEMENT-WOUND Left 2016    Performed by [facility]ox [facility] at [facility] OR    DIALYSIS FISTULA CREATION      multiple fistulas and grafts before PD     EGD (ESOPHAGOGASTRODUODENOSCOPY) N/A 3/14/2019    Performed by [facility] at [facility] ENDO    EGD (ESOPHAGOGASTRODUODENOSCOPY) N/A 3/13/2019    Performed by [facility]olph [facility]ila [facility] at [facility] ENDO    INCISION AND DRAINAGE (I&D), LABIAL N/A 2016    Performed by Harmony Fernandez MD at [facility] OR    INCISION AND DRAINAGE (I&D), LABIAL (ADD ON) Left 2016    Performed by [facility]ressa Maddox MD at [facility] OR    INCISION AND DRAINAGE OF WOUND Left     VULVAR ABCESS WITH NECROSIS    Insertion, Catheter, Central Venous, Hemodialysis N/A 3/28/2019    Performed by Kimo Weeks MD at [facility] CATH LAB     Insertion, Catheter, Central Venous, Hemodialysis N/A 3/18/2019    Performed by [facility] at [facility] CATH LAB    INSERTION, CATHETER, TUNNELED ABORTED Left 3/14/2019    Performed by Servando Solis MD at [facility] OR    INSERTION, GRAFT, ARTERIOVENOUS, RIGHT ARM Right 3/22/2019    Performed by BESSIE Wynn III, MD at [facility] OR 2ND FLR    INSERTION, INTRA-AORTIC BALLOON PUMP  4/15/2019    Performed by Roland Napier MD at [facility] CATH LAB    Left heart cath Left 4/15/2019    Performed by Roland Napier MD at [facility] CATH LAB    ORIF, HIP Left 9/13/2018    Performed by Tevin Grullon MD at [facility] OR    PERITONEAL CATHETER INSERTION      PERMCATH REWIRE- TUNNELED CATH REWIRE Left 11/13/2017    Performed by Baldev Munroe MD at [facility] CATH LAB    PERMCATH REWIRE- TUNNELED CATH REWIRE N/A 10/5/2017    Performed by Baldev Munroe MD at [facility] CATH LAB    REMOVAL, CATHETER, DIALYSIS, PERITONEAL N/A 3/14/2019    Performed by Servando Solis MD at [facility] OR    REPLACEMENT, CATHETER, DIALYSIS, OVER GUIDEWIRE, USING EXISTING VENOUS ACCESS N/A 6/27/2019    Performed by Kimo Weeks MD at [facility] CATH LAB    REPLACEMENT, CATHETER, DIALYSIS, OVER GUIDEWIRE, USING EXISTING VENOUS ACCESS Left 5/24/2019    Performed by Baldev Munroe MD at [facility] CATH LAB    UMBILICAL HERNIA REPAIR      Venogram, possible intervention Right 3/20/2019    Performed by BESSIE Wynn III, MD at [facility] CATH LAB       Time Tracking:     PT Received On: 07/07/19  PT Start Time: 0757     PT Stop Time: 0827  PT Total Time (min): 30 min     Billable Minutes: Evaluation 10 min and Therapeutic Activity 20 min      Meghan Rueda, PT  07/07/2019

## 2019-07-07 NOTE — ASSESSMENT & PLAN NOTE
-reports a clot in vascular access line noticed at dialysis on 07/05  -Neprhology consulted and following  -Neprhology flushed line with alteplase on 07/07  -plans for HD on 07/07  -renal vitamins

## 2019-07-07 NOTE — NURSING
"Pt. C/o numbness to RLE. Reports past hx of "leg falling asleep" when elevated. C/o minimal pain, pedal pulse palpated, cap refill less than 3 secs. Adjusted position of sheets to lower elevation. Notified on-call MD for HM:2. MD reported they will evaluate pt at bedside. WCTM.  "

## 2019-07-07 NOTE — CARE UPDATE
Patient seen and examined at bedside. She underwent external fixator placement for her right tibial plateau fracture today. Her compartments of the right  lower leg are soft, skin easily wrinkles throughout. SILT throughout RLE. She states that her pain is controlled. Leg currently iced and elevated.     Sunil Mccarty MD  Orthopedic Surgery Resident   07/07/2019   12:24 AM

## 2019-07-07 NOTE — PLAN OF CARE
Problem: Adult Inpatient Plan of Care  Goal: Plan of Care Review  Outcome: Ongoing (interventions implemented as appropriate)  AOOx4. Pt updated on POC. Verbalized understanding. No complaints of N/V. Pain managed with PRN meds. In NADN. VS as charted. RLE elevated c ice to affected area. Diabetic diet tolerated. No falls this shift. Bed in lowest position. Call bell in reach. Will continue to monitor.

## 2019-07-07 NOTE — PROGRESS NOTES
[facility]  Orthopedics  Progress Note    Patient Name: Jenni Todd  MRN: [id number]  Admission Date: 7/6/2019  Hospital Length of Stay: 0 days  Attending Provider: Lilia Zurita MD  Primary Care Provider: Michael Tan Iii, MD  Follow-up For: Procedure(s) (LRB):  APPLICATION, EXTERNAL FIXATION DEVICE, LARGE, TIBIA- SYNTHES (Right)    Post-Operative Day: 1 Day Post-Op  Subjective:     Principal Problem:Closed fracture of right tibial plateau    Principal Orthopedic Problem: same    Interval History: Patient seen and examined at bedside.  No acute events overnight.  Pain controlled.  PT/OT to see daily. Ex fix in place, pin site care in place. RLE elevated and iced    Review of patient's allergies indicates:   Allergen Reactions    Flagyl [metronidazole hcl] Nausea And Vomiting    Clindamycin Diarrhea    Metronidazole        Current Facility-Administered Medications   Medication    acetaminophen tablet 1,000 mg    acetaminophen tablet 650 mg    alteplase injection 6 mg    atorvastatin tablet 40 mg    dextrose 10% (D10W) Bolus    dextrose 10% (D10W) Bolus    famotidine tablet 20 mg    ferrous sulfate EC tablet 325 mg    gabapentin capsule 300 mg    glucagon (human recombinant) injection 1 mg    glucose chewable tablet 16 g    glucose chewable tablet 24 g    HYDROmorphone injection 0.2 mg    insulin aspart U-100 pen 0-5 Units    oxyCODONE immediate release tablet 10 mg    oxyCODONE immediate release tablet 15 mg    oxyCODONE immediate release tablet 5 mg    sevelamer carbonate tablet 800 mg    sodium chloride 0.9% flush 10 mL    sodium chloride 0.9% flush 10 mL    vitamin renal formula (B-complex-vitamin c-folic acid) 1 mg per capsule 1 capsule     Objective:     Vital Signs (Most Recent):  Temp: 97.6 °F (36.4 °C) (07/07/19 0506)  Pulse: 83 (07/07/19 0701)  Resp: 19 (07/07/19 0506)  BP: 130/72 (07/07/19 0506)  SpO2: 100 % (07/07/19 0506) Vital Signs (24h Range):  Temp:   [95.9 °F (35.5 °C)-98.5 °F (36.9 °C)] 97.6 °F (36.4 °C)  Pulse:  [56-93] 83  Resp:  [13-20] 19  SpO2:  [94 %-100 %] 100 %  BP: (103-191)/(52-81) 130/72           There is no height or weight on file to calculate BMI.      Intake/Output Summary (Last 24 hours) at 7/7/2019 0716  Last data filed at 7/7/2019 0200  Gross per 24 hour   Intake 660 ml   Output 0 ml   Net 660 ml       Ortho/SPM Exam   Vitals: Afebrile.  Vital signs stable.  General: No acute distress.  HEENT: Normocephalic. Atraumatic. Sclera anicteric. No tracheal deviation.  Cardio: Regular rate.  Chest: No increased work of breathing.  Abdominal: Nondistended.  Extremities: No cyanosis.  No clubbing.  No edema.    Skin: No generalized rash.  Neuro: Awake. Alert. Oriented to person, place, time, and situation.  Psych: Normal appearance. Cooperative.  Appropriate mood.  Appropriate affect.      MSK:    Stage one right ischial ulcer without drainage or SOI    RLE:  Knee spanning ex fix in place, pin sites without drainage or SOI  SILT throughout distally  Moderately swollen lower leg  Compartments soft with good skin wrinkling  Leg elevated and iced    Significant Labs:   CBC:   Recent Labs   Lab 07/06/19  0824   WBC 9.72   HGB 9.3*   HCT 30.6*        CMP:   Recent Labs   Lab 07/06/19  0824   *   K 4.1   CL 93*   CO2 22*   *   BUN 16   CREATININE 5.9*   CALCIUM 10.8*   PROT 8.6*   ALBUMIN 3.3*   BILITOT 0.4   ALKPHOS 89   AST 41*   ALT 20   ANIONGAP 16   EGFRNONAA 7.8*       Significant Imaging: I have reviewed all pertinent imaging results/findings.    Assessment/Plan:     * Closed fracture of right tibial plateau  Jenni Todd is a 48 y.o. female with closed Schatzker 6 tibial plateau fracture  - External Fixator placed yesterday  - pin sites without drainage or SOI this morning  - Twice daily pin site care:  Clean pins with 50% peroxide / 50% sterile water solution.  Wrap pins with Kerlix gauze.  - nephrology following for HD  treatments, HD line currently is clotted off - nephrology to interrogate and start HD as see fit  - pain control and home meds per primary team (Hos Med)  - DVT ppx per primary - will hold day before definitive surgery once scheduled  - PT/OT daily, NWB RLE  - aggressive ice and elevation RLE  - wound care consulted for stage 1 right ischial decubitus ulcer    Dispo: Patient consented for definitive surgery, surgery date not determined yet, will discuss with staff. She will likely need SNF/Rehab placement           Sunil Mccarty MD  Orthopedics  [facility]

## 2019-07-07 NOTE — SUBJECTIVE & OBJECTIVE
Principal Problem:Closed fracture of right tibial plateau    Principal Orthopedic Problem: same    Interval History: Patient seen and examined at bedside.  No acute events overnight.  Pain controlled.  PT/OT to see daily. Ex fix in place, pin site care in place. RLE elevated and iced    Review of patient's allergies indicates:   Allergen Reactions    Flagyl [metronidazole hcl] Nausea And Vomiting    Clindamycin Diarrhea    Metronidazole        Current Facility-Administered Medications   Medication    acetaminophen tablet 1,000 mg    acetaminophen tablet 650 mg    alteplase injection 6 mg    atorvastatin tablet 40 mg    dextrose 10% (D10W) Bolus    dextrose 10% (D10W) Bolus    famotidine tablet 20 mg    ferrous sulfate EC tablet 325 mg    gabapentin capsule 300 mg    glucagon (human recombinant) injection 1 mg    glucose chewable tablet 16 g    glucose chewable tablet 24 g    HYDROmorphone injection 0.2 mg    insulin aspart U-100 pen 0-5 Units    oxyCODONE immediate release tablet 10 mg    oxyCODONE immediate release tablet 15 mg    oxyCODONE immediate release tablet 5 mg    sevelamer carbonate tablet 800 mg    sodium chloride 0.9% flush 10 mL    sodium chloride 0.9% flush 10 mL    vitamin renal formula (B-complex-vitamin c-folic acid) 1 mg per capsule 1 capsule     Objective:     Vital Signs (Most Recent):  Temp: 97.6 °F (36.4 °C) (07/07/19 0506)  Pulse: 83 (07/07/19 0701)  Resp: 19 (07/07/19 0506)  BP: 130/72 (07/07/19 0506)  SpO2: 100 % (07/07/19 0506) Vital Signs (24h Range):  Temp:  [95.9 °F (35.5 °C)-98.5 °F (36.9 °C)] 97.6 °F (36.4 °C)  Pulse:  [56-93] 83  Resp:  [13-20] 19  SpO2:  [94 %-100 %] 100 %  BP: (103-191)/(52-81) 130/72           There is no height or weight on file to calculate BMI.      Intake/Output Summary (Last 24 hours) at 7/7/2019 0716  Last data filed at 7/7/2019 0200  Gross per 24 hour   Intake 660 ml   Output 0 ml   Net 660 ml       Ortho/SPM Exam   Vitals: Afebrile.   Vital signs stable.  General: No acute distress.  HEENT: Normocephalic. Atraumatic. Sclera anicteric. No tracheal deviation.  Cardio: Regular rate.  Chest: No increased work of breathing.  Abdominal: Nondistended.  Extremities: No cyanosis.  No clubbing.  No edema.    Skin: No generalized rash.  Neuro: Awake. Alert. Oriented to person, place, time, and situation.  Psych: Normal appearance. Cooperative.  Appropriate mood.  Appropriate affect.      MSK:    Stage one right ischial ulcer without drainage or SOI    RLE:  Knee spanning ex fix in place, pin sites without drainage or SOI  SILT throughout distally  Moderately swollen lower leg  Compartments soft with good skin wrinkling  Leg elevated and iced    Significant Labs:   CBC:   Recent Labs   Lab 07/06/19  0824   WBC 9.72   HGB 9.3*   HCT 30.6*        CMP:   Recent Labs   Lab 07/06/19  0824   *   K 4.1   CL 93*   CO2 22*   *   BUN 16   CREATININE 5.9*   CALCIUM 10.8*   PROT 8.6*   ALBUMIN 3.3*   BILITOT 0.4   ALKPHOS 89   AST 41*   ALT 20   ANIONGAP 16   EGFRNONAA 7.8*       Significant Imaging: I have reviewed all pertinent imaging results/findings.

## 2019-07-07 NOTE — ASSESSMENT & PLAN NOTE
Jenni Todd is a 48 y.o. female with closed Schatzker 6 tibial plateau fracture  - External Fixator placed yesterday  - pin sites without drainage or SOI this morning  - Twice daily pin site care:  Clean pins with 50% peroxide / 50% sterile water solution.  Wrap pins with Kerlix gauze.  - nephrology following for HD treatments, HD line currently is clotted off - nephrology to interrogate and start HD as see fit  - pain control and home meds per primary team (Hos Med)  - DVT ppx per primary - will hold day before definitive surgery once scheduled  - PT/OT daily, NWB RLE  - aggressive ice and elevation RLE  - wound care consulted for stage 1 right ischial decubitus ulcer    Dispo: Patient consented for definitive surgery, surgery date not determined yet, will discuss with staff. She will likely need SNF/Rehab placement

## 2019-07-07 NOTE — SUBJECTIVE & OBJECTIVE
"Review of Systems   Constitutional: Negative for activity change, appetite change, chills and fever.   HENT: Negative for sore throat and trouble swallowing.    Respiratory: Negative for cough, choking and shortness of breath.    Cardiovascular: Negative for chest pain, palpitations and leg swelling.   Gastrointestinal: Positive for abdominal distention. Negative for abdominal pain and vomiting.   Genitourinary: Negative for difficulty urinating.   Musculoskeletal: Negative for arthralgias and back pain.        Diffuse R leg pain at 10/10. Patient also complaining that her R leg has "fallen asleep."   Skin: Negative for rash and wound.   Neurological: Negative for dizziness, light-headedness and headaches.   Psychiatric/Behavioral: Negative for agitation.     Objective:     Vital Signs (Most Recent):  Temp: 98.1 °F (36.7 °C) (07/07/19 1112)  Pulse: 75 (07/07/19 1112)  Resp: 20 (07/07/19 1112)  BP: (!) 142/62 (07/07/19 1112)  SpO2: 100 % (07/07/19 1112) Vital Signs (24h Range):  Temp:  [95.9 °F (35.5 °C)-98.1 °F (36.7 °C)] 98.1 °F (36.7 °C)  Pulse:  [60-93] 75  Resp:  [14-20] 20  SpO2:  [94 %-100 %] 100 %  BP: (103-191)/(54-81) 142/62     Weight: 90.3 kg (199 lb 1.2 oz)  Body mass index is 31.18 kg/m².    Physical Exam   Constitutional: She is oriented to person, place, and time. She appears well-developed and well-nourished. No distress.   HENT:   Head: Normocephalic and atraumatic.   Eyes: Conjunctivae and EOM are normal.   Neck: Normal range of motion. Neck supple.   Cardiovascular: Normal rate, regular rhythm and normal heart sounds.   No murmur heard.  Pulmonary/Chest: Effort normal and breath sounds normal. No respiratory distress.   Abdominal: Bowel sounds are normal. She exhibits distension. There is no tenderness. There is no rebound.   Musculoskeletal: She exhibits no edema.   Ex-fix and bandages in place on R leg  Distal pulses intact bilaterally   Neurological: She is alert and oriented to person, place, " and time. No sensory deficit.   Skin: Skin is warm and dry.   Psychiatric: She has a normal mood and affect.         CRANIAL NERVES     CN III, IV, VI   Extraocular motions are normal.        Significant Labs: All pertinent labs within the past 24 hours have been reviewed.    Significant Imaging: I have reviewed all pertinent imaging results/findings within the past 24 hours.

## 2019-07-07 NOTE — CARE UPDATE
Received phone call at 1406 that patient reported numbness in her RLE. The patient was urgently evaluated at 1414 pm. Upon entry to the room, the patient was conversant, pain was controlled. She reports that when she goes to dialysis, her arms often fall asleep and become numb and she has difficulty moving them. She said her RLE is feeling the same way. She was losing sensation in her toes and foot. The nurse had just recently lowered the stack of sheets elevating the foot. The patient denied pain with passive stretch of her toes, her skin over her right knee was soft and could wrinkle easily. She had 1+ DP pulses (similar to yesterdays exam). Her sensation was absent in the toes including SP/DP distribution but returned more proximally around the ankle. . She was able to minimally move the phalanges of her RLE. At this time, there is no immediate concern for compartment syndrome as she has no pain with passive stretch of her toes and excellent pulses. Considering her history of her upper extremities falling asleep and history of neuropathy at baseline, this is very unlikely compartment syndrome. Encouraged the patient she can change positions regularly, only keep the RLE elevated above her heart. She understands this.

## 2019-07-07 NOTE — NURSING
Pin site care done c 50% sterile water/ 50% hydrogen peroxide. Minimal sanguineous drainage noted.  Wrapped area c kerlix. WCTM.

## 2019-07-08 NOTE — ASSESSMENT & PLAN NOTE
Jenni Todd is a 48 y.o. female with closed Schatzker 6 tibial plateau fracture  - External Fixator placed 7/6/19  - Twice daily pin site care:  Clean pins with 50% peroxide / 50% sterile water solution.  Wrap pins with Kerlix gauze.  - nephrology following for HD treatments, HD line currently is clotted off - nephrology to interrogate  - DVT ppx - will hold day before definitive surgery once scheduled  - PT/OT daily, NWB RLE  - aggressive ice and elevation RLE  - wound care consulted for stage 1 right ischial decubitus ulcer  - labs: pending    Dispo: Patient consented for definitive surgery. She will likely need SNF/Rehab placement.  Could still benefit from reduction in swelling.  Continue ice and elevation.  OK for diet today.  Will reevaluate swelling in AM.

## 2019-07-08 NOTE — PROGRESS NOTES
[facility] HEMODIALYSIS NOTE     Patient currently on hemodialysis for removal of uremic toxins and volume.     Patient seen and evaluated on hemodialysis, tolerating treatment, see HD flowsheet for vitals and assessments.      Ultrafiltration goal is 3L as tolerated (pt unable to weigh in bed or stand to be weighed), keep mpa >65     Labs have been reviewed and the dialysate bath has been adjusted.     Assessment/Plan:    -Patient seen on HD, tolerating treatment well, w/o complaints   -15mg midodrine ordered as one time dose today in HD   -Renal diet  -Strict I/O's and daily weights  -Daily renal function panels  -Hbg 8.2, 5800u Epo ordered (per outpatient HD records)   -Phos 5.0, continue renvela with meals, hold if NPO of if patient is not eating    -Will continue to follow while inpatient       BERNICE Polanco, AGNP-C  Nephrology  Pager:  [phone]

## 2019-07-08 NOTE — NURSING
Patient left via to dialysis in bed . Report giving to nurse patient need to prepared for 2 units of RBC

## 2019-07-08 NOTE — PROGRESS NOTES
[facility]  Orthopedics  Progress Note    Patient Name: Jenni Todd  MRN: [id number]  Admission Date: 7/6/2019  Hospital Length of Stay: 0 days  Attending Provider: Lilia Zurita MD  Primary Care Provider: Michael Tan Iii, MD  Follow-up For: Procedure(s) (LRB):  APPLICATION, EXTERNAL FIXATION DEVICE, LARGE, TIBIA- SYNTHES (Right)    Post-Operative Day: 2 Days Post-Op  Subjective:     Principal Problem:Closed fracture of right tibial plateau    Principal Orthopedic Problem: same    Interval History: Patient seen and examined at bedside.  No acute events overnight.  Pain controlled.  Transfers with PT yesterday.  RLE elevated and iced.    Review of patient's allergies indicates:   Allergen Reactions    Flagyl [metronidazole hcl] Nausea And Vomiting    Clindamycin Diarrhea    Metronidazole        Current Facility-Administered Medications   Medication    0.9%  NaCl infusion    acetaminophen tablet 1,000 mg    acetaminophen tablet 650 mg    atorvastatin tablet 40 mg    dextrose 10% (D10W) Bolus    dextrose 10% (D10W) Bolus    famotidine tablet 20 mg    ferrous sulfate EC tablet 325 mg    gabapentin capsule 300 mg    glucagon (human recombinant) injection 1 mg    glucose chewable tablet 16 g    glucose chewable tablet 24 g    HYDROmorphone injection 0.2 mg    insulin aspart U-100 pen 0-5 Units    oxyCODONE immediate release tablet 10 mg    oxyCODONE immediate release tablet 15 mg    oxyCODONE immediate release tablet 5 mg    sevelamer carbonate tablet 800 mg    sodium chloride 0.9% flush 10 mL    sodium chloride 0.9% flush 10 mL    vitamin renal formula (B-complex-vitamin c-folic acid) 1 mg per capsule 1 capsule     Objective:     Vital Signs (Most Recent):  Temp: 97.5 °F (36.4 °C) (07/07/19 2017)  Pulse: 74 (07/08/19 0318)  Resp: 20 (07/07/19 2017)  BP: (!) 133/57 (07/07/19 2017)  SpO2: 97 % (07/07/19 2017) Vital Signs (24h Range):  Temp:  [96.6 °F (35.9 °C)-98.2 °F  "(36.8 °C)] 97.5 °F (36.4 °C)  Pulse:  [74-87] 74  Resp:  [18-20] 20  SpO2:  [97 %-100 %] 97 %  BP: (120-142)/(57-62) 133/57     Weight: 90.3 kg (199 lb 1.2 oz)  Height: 5' 7" (170.2 cm)  Body mass index is 31.18 kg/m².      Intake/Output Summary (Last 24 hours) at 7/8/2019 0553  Last data filed at 7/7/2019 1400  Gross per 24 hour   Intake 500 ml   Output 0 ml   Net 500 ml       Ortho/SPM Exam     AAOx4  NAD  Reg rate  No increased WOB     MSK:    Stage one right ischial ulcer without drainage or SOI    RLE:  Knee spanning ex fix in place, pin sites without drainage or SOI  SILT throughout distally  Moderately swollen lower leg  Minimal skin wrinkling proximal tibia  Leg elevated and iced    Significant Labs:   CBC:   Recent Labs   Lab 07/06/19  0824 07/07/19  0637   WBC 9.72 9.36   HGB 9.3* 8.0*   HCT 30.6* 27.4*    176     CMP:   Recent Labs   Lab 07/06/19  0824 07/07/19  0637   * 140   K 4.1 4.0   CL 93* 98   CO2 22* 25   * 152*   BUN 16 21*   CREATININE 5.9* 6.9*   CALCIUM 10.8* 10.7*   PROT 8.6* 8.2   ALBUMIN 3.3* 3.0*   BILITOT 0.4 0.3   ALKPHOS 89 90   AST 41* 24   ALT 20 13   ANIONGAP 16 17*   EGFRNONAA 7.8* 6.4*       Significant Imaging: I have reviewed all pertinent imaging results/findings.    Assessment/Plan:     * Closed fracture of right tibial plateau  Jenni Todd is a 48 y.o. female with closed Schatzker 6 tibial plateau fracture  - External Fixator placed 7/6/19  - Twice daily pin site care:  Clean pins with 50% peroxide / 50% sterile water solution.  Wrap pins with Kerlix gauze.  - nephrology following for HD treatments, HD line currently is clotted off - nephrology to interrogate  - DVT ppx - will hold day before definitive surgery once scheduled  - PT/OT daily, NWB RLE  - aggressive ice and elevation RLE  - wound care consulted for stage 1 right ischial decubitus ulcer  - labs: pending    Dispo: Patient consented for definitive surgery. She will likely need SNF/Rehab " placement.  Could still benefit from reduction in swelling.  Continue ice and elevation.  OK for diet today.  Will reevaluate swelling in AM.          Shyam Cohen MD  Orthopedics  [facility]

## 2019-07-08 NOTE — PROGRESS NOTES
[facility] Hospital Medicine  Progress Note    Patient Name: Jenni Todd  MRN: [id number]  Patient Class: OP- Observation   Admission Date: 7/6/2019  Length of Stay: 0 days  Attending Physician: Lilia Zurita MD  Primary Care Provider: Michael Tan Iii, MD    [facility] Medicine Team: [facility] 2 Eliel Phillips MD    Subjective:     Principal Problem:Closed fracture of right tibial plateau      HPI:  Patient is a 47 yo F with PMH of ESRD (on HD MWF), DM2, neuropathy, HTN, HLD, CAD, obesity, bulging discs in back who presented to the ED via ambulance following a fall. Patient was walking down the steps in front of her house and fell down and twisted her R leg. She described her leg pain as a 10/10. Last night, while at dialysis, her nurse could not aspirate her R femoral line, and she was instructed to come to the ER due to a clot in her line. She was on her way out of her house to come to the ER when she fell. At baseline, she uses a walker to get around due to her neuropathy in her feet and has a history of falls. In 2018, she fractured her hip and was operated on by Dr. Grullon.    She has had an extensive history for recurrent vascular access problems. She was on peritoneal dialysis for 14 years prior to February 2019, at which point she developed peritonitis, was admitted, and had her catheter removed and switched to HD. 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 Her last dialysis was on Wednesday, 07/03. She plans for a peritoneal dialysis catheter placement on July 25th with Dr. Tucker at [facility].    In the ED, XRs of her pelvis, knee, femur, tibia, and ankle of her R leg were ordered. R tibia XR revealed a tibial fracture and signs of osteopenia. Ortho and Nephrology were consulted. Her ASA and plavix were held, she was splinted, and Ortho planned for an ex-fix today, which was cleared  "by Nephrology.           Overview/Hospital Course:  Patient was admitted to the floor on 07/06 with a R tibial fracture and a clotted R femoral access line. Orthopedic Surgery and Nephrology were consulted from the ED. Ortho operated on the patient on 07/06 and did an ex-fix with plans for definitive correction surgery at a later date. On 07/07, Nephrology flushed alteplase into her access line, and she is scheduled for dialysis treatment. Her pain is well-controlled with Tylenol and Oxycodone with Dilaudid available for breakthrough pain. Her home medications were resumed upon admission, except her midodrine was held prior to surgery due to concerns for falling Bps.  Patient underwent HD on 7/8. Tolerated session well with 1L fluid removal. Needed Midodrine once. Waiting for Ortho for final definitive procedure date.    Interval History: NAEON. Pain is controled with PRN meds. Patient is able to move Rt foot toes with intact pulse and sensation. Patient underwent HD. Tolerated session well with 1L fluid removal. Needed Midodrine once. Waiting for Ortho for final definitive procedure date.    Review of Systems   Constitutional: Negative for activity change, appetite change, chills and fever.   HENT: Negative for rhinorrhea, sore throat and trouble swallowing.    Eyes: Negative for visual disturbance.   Respiratory: Negative for cough, choking and shortness of breath.    Cardiovascular: Negative for chest pain, palpitations and leg swelling.   Gastrointestinal: Positive for abdominal distention. Negative for abdominal pain and vomiting.   Genitourinary: Negative for difficulty urinating and dysuria.   Musculoskeletal: Positive for arthralgias. Negative for back pain.        Diffuse R leg pain. R leg occasional "fallen asleep'' improved today.   Skin: Negative for rash and wound.   Neurological: Negative for dizziness, light-headedness and headaches.   Psychiatric/Behavioral: Negative for agitation and confusion. "     Objective:     Vital Signs (Most Recent):  Temp: 98.9 °F (37.2 °C) (07/08/19 1741)  Pulse: 96 (07/08/19 1741)  Resp: 18 (07/08/19 1741)  BP: (!) 178/72 (07/08/19 1741)  SpO2: 97 % (07/08/19 1741) Vital Signs (24h Range):  Temp:  [96.7 °F (35.9 °C)-98.9 °F (37.2 °C)] 98.9 °F (37.2 °C)  Pulse:  [74-96] 96  Resp:  [18-20] 18  SpO2:  [95 %-100 %] 97 %  BP: ()/() 178/72     Weight: 90.3 kg (199 lb 1.2 oz)  Body mass index is 31.18 kg/m².    Intake/Output Summary (Last 24 hours) at 7/8/2019 1748  Last data filed at 7/8/2019 1209  Gross per 24 hour   Intake 650 ml   Output 1650 ml   Net -1000 ml      Physical Exam   Constitutional: She is oriented to person, place, and time. She appears well-developed and well-nourished. No distress.   HENT:   Head: Normocephalic and atraumatic.   Eyes: Conjunctivae and EOM are normal.   Neck: Normal range of motion. Neck supple. No JVD present.   Cardiovascular: Normal rate, regular rhythm and normal heart sounds.   No murmur heard.  Pulmonary/Chest: Effort normal and breath sounds normal. No respiratory distress. She has no wheezes. She has no rales.   Abdominal: Bowel sounds are normal. She exhibits distension. There is no tenderness. There is no rebound.   Musculoskeletal: She exhibits no edema.   Ex-fix and bandages in place on R leg  Distal pulses intact bilaterally   Neurological: She is alert and oriented to person, place, and time. No sensory deficit.   Skin: Skin is warm and dry.   Psychiatric: She has a normal mood and affect. Her behavior is normal. Judgment normal.       Significant Labs: All pertinent labs within the past 24 hours have been reviewed.    Significant Imaging: I have reviewed all pertinent imaging results/findings within the past 24 hours.      Assessment/Plan:      * Closed fracture of right tibial plateau  -07/06, XR showing tibial fracture  -Ortho consulted and following  -Ortho did ex-fix on 07/06 with plans for definitive surgery at a later  date  -PT/OT consulted  -Pain control via tylenol and oxycodone with dilaudid available for breakthrough      ESRD (end stage renal disease) on dialysis  -reports a clot in vascular access line noticed at dialysis on 07/05  -Neprhology consulted and following  -Neprhology flushed line with alteplase on 07/07  -HD on 07/08. Went well with 1L fluid removal. Needed Midodrine once for low BP.  -renal vitamins    Bulging discs - symptomatic   -gabapentin 300 BID      CAD (coronary artery disease)  -holding home ASA and plavix      Hyperlipidemia  -restart home lipitor 40      Anemia in chronic kidney disease, on chronic dialysis  -Iron 325 mg BID      Neuropathy  -History of falls 2/2 lower extremity neuropathy.  -Patient fell while walking down the steps outside of her house  -Resume home gabapentin 300 BID        VTE Risk Mitigation (From admission, onward)        Ordered     heparin (porcine) injection 5,000 Units  Every 8 hours      07/08/19 1415     heparin (porcine) injection 1,000 Units  As needed (PRN)      07/08/19 1210     IP VTE HIGH RISK PATIENT  Once      07/06/19 1458     Place sequential compression device  Until discontinued      07/06/19 1159                Eliel Phillips MD  Department of Hospital Medicine   [facility]

## 2019-07-08 NOTE — SUBJECTIVE & OBJECTIVE
"Principal Problem:Closed fracture of right tibial plateau    Principal Orthopedic Problem: same    Interval History: Patient seen and examined at bedside.  No acute events overnight.  Pain controlled.  Transfers with PT yesterday.  RLE elevated and iced.    Review of patient's allergies indicates:   Allergen Reactions    Flagyl [metronidazole hcl] Nausea And Vomiting    Clindamycin Diarrhea    Metronidazole        Current Facility-Administered Medications   Medication    0.9%  NaCl infusion    acetaminophen tablet 1,000 mg    acetaminophen tablet 650 mg    atorvastatin tablet 40 mg    dextrose 10% (D10W) Bolus    dextrose 10% (D10W) Bolus    famotidine tablet 20 mg    ferrous sulfate EC tablet 325 mg    gabapentin capsule 300 mg    glucagon (human recombinant) injection 1 mg    glucose chewable tablet 16 g    glucose chewable tablet 24 g    HYDROmorphone injection 0.2 mg    insulin aspart U-100 pen 0-5 Units    oxyCODONE immediate release tablet 10 mg    oxyCODONE immediate release tablet 15 mg    oxyCODONE immediate release tablet 5 mg    sevelamer carbonate tablet 800 mg    sodium chloride 0.9% flush 10 mL    sodium chloride 0.9% flush 10 mL    vitamin renal formula (B-complex-vitamin c-folic acid) 1 mg per capsule 1 capsule     Objective:     Vital Signs (Most Recent):  Temp: 97.5 °F (36.4 °C) (07/07/19 2017)  Pulse: 74 (07/08/19 0318)  Resp: 20 (07/07/19 2017)  BP: (!) 133/57 (07/07/19 2017)  SpO2: 97 % (07/07/19 2017) Vital Signs (24h Range):  Temp:  [96.6 °F (35.9 °C)-98.2 °F (36.8 °C)] 97.5 °F (36.4 °C)  Pulse:  [74-87] 74  Resp:  [18-20] 20  SpO2:  [97 %-100 %] 97 %  BP: (120-142)/(57-62) 133/57     Weight: 90.3 kg (199 lb 1.2 oz)  Height: 5' 7" (170.2 cm)  Body mass index is 31.18 kg/m².      Intake/Output Summary (Last 24 hours) at 7/8/2019 0553  Last data filed at 7/7/2019 1400  Gross per 24 hour   Intake 500 ml   Output 0 ml   Net 500 ml       Ortho/SPM Exam     AAOx4  NAD  Reg " rate  No increased WOB     MSK:    Stage one right ischial ulcer without drainage or SOI    RLE:  Knee spanning ex fix in place, pin sites without drainage or SOI  SILT throughout distally  Moderately swollen lower leg  Minimal skin wrinkling proximal tibia  Leg elevated and iced    Significant Labs:   CBC:   Recent Labs   Lab 07/06/19  0824 07/07/19  0637   WBC 9.72 9.36   HGB 9.3* 8.0*   HCT 30.6* 27.4*    176     CMP:   Recent Labs   Lab 07/06/19  0824 07/07/19  0637   * 140   K 4.1 4.0   CL 93* 98   CO2 22* 25   * 152*   BUN 16 21*   CREATININE 5.9* 6.9*   CALCIUM 10.8* 10.7*   PROT 8.6* 8.2   ALBUMIN 3.3* 3.0*   BILITOT 0.4 0.3   ALKPHOS 89 90   AST 41* 24   ALT 20 13   ANIONGAP 16 17*   EGFRNONAA 7.8* 6.4*       Significant Imaging: I have reviewed all pertinent imaging results/findings.

## 2019-07-08 NOTE — ASSESSMENT & PLAN NOTE
-reports a clot in vascular access line noticed at dialysis on 07/05  -Neprhology consulted and following  -Neprhology flushed line with alteplase on 07/07  -HD on 07/08. Went well with 1L fluid removal. Needed Midodrine once for low BP.  -renal vitamins

## 2019-07-08 NOTE — PHARMACY MED REC
"Admission Medication Reconciliation - Pharmacy Consult Note    The home medication history was taken by Izabella Dougherty, Pharmacy Technician.  Based on information gathered and subsequent review by the clinical pharmacist, the items below may need attention.     You may go to "Admission" then "Reconcile Home Medications" tabs to review and/or act upon these items.     Potentially problematic discrepancies with current MAR  o Patient IS taking the following which was not ordered upon admit  o Aspirin 81 mg oral daily  o Clopidogrel 75 mg oral daily  o Ergocalciferol 50,000 units oral weekly on Sundays    Please address this information as you see fit.  Feel free to contact us if you have any questions or require assistance.    Thank you,   Macario Dos Santos, PharmD  Emergency Medicine Clinical Pharmacist  [phone] (2pm-midnight daily)          .    .            "

## 2019-07-08 NOTE — PT/OT/SLP PROGRESS
Occupational Therapy      Patient Name:  Jenni Todd   MRN:  [id number]    OT eval and treat orders received and acknowledged. Patient not seen today secondary to pt in dialysis in AM. Therapy arrived in PM for eval attempt as pt was receiving blood. Pt politely declined stating she was too fatigued this day and would like to work w/ therapy tomorrow. Will follow-up tomorrow for OT eval.    Rona Gaston, OT  7/8/2019

## 2019-07-08 NOTE — SUBJECTIVE & OBJECTIVE
"Interval History: NAEON. Pain is controled with PRN meds. Patient is able to move Rt foot toes with intact pulse and sensation. Patient underwent HD. Tolerated session well with 1L fluid removal. Needed Midodrine once. Waiting for Ortho for final definitive procedure date.    Review of Systems   Constitutional: Negative for activity change, appetite change, chills and fever.   HENT: Negative for rhinorrhea, sore throat and trouble swallowing.    Eyes: Negative for visual disturbance.   Respiratory: Negative for cough, choking and shortness of breath.    Cardiovascular: Negative for chest pain, palpitations and leg swelling.   Gastrointestinal: Positive for abdominal distention. Negative for abdominal pain and vomiting.   Genitourinary: Negative for difficulty urinating and dysuria.   Musculoskeletal: Positive for arthralgias. Negative for back pain.        Diffuse R leg pain. R leg occasional "fallen asleep'' improved today.   Skin: Negative for rash and wound.   Neurological: Negative for dizziness, light-headedness and headaches.   Psychiatric/Behavioral: Negative for agitation and confusion.     Objective:     Vital Signs (Most Recent):  Temp: 98.9 °F (37.2 °C) (07/08/19 1741)  Pulse: 96 (07/08/19 1741)  Resp: 18 (07/08/19 1741)  BP: (!) 178/72 (07/08/19 1741)  SpO2: 97 % (07/08/19 1741) Vital Signs (24h Range):  Temp:  [96.7 °F (35.9 °C)-98.9 °F (37.2 °C)] 98.9 °F (37.2 °C)  Pulse:  [74-96] 96  Resp:  [18-20] 18  SpO2:  [95 %-100 %] 97 %  BP: ()/() 178/72     Weight: 90.3 kg (199 lb 1.2 oz)  Body mass index is 31.18 kg/m².    Intake/Output Summary (Last 24 hours) at 7/8/2019 1748  Last data filed at 7/8/2019 1209  Gross per 24 hour   Intake 650 ml   Output 1650 ml   Net -1000 ml      Physical Exam   Constitutional: She is oriented to person, place, and time. She appears well-developed and well-nourished. No distress.   HENT:   Head: Normocephalic and atraumatic.   Eyes: Conjunctivae and EOM are " normal.   Neck: Normal range of motion. Neck supple. No JVD present.   Cardiovascular: Normal rate, regular rhythm and normal heart sounds.   No murmur heard.  Pulmonary/Chest: Effort normal and breath sounds normal. No respiratory distress. She has no wheezes. She has no rales.   Abdominal: Bowel sounds are normal. She exhibits distension. There is no tenderness. There is no rebound.   Musculoskeletal: She exhibits no edema.   Ex-fix and bandages in place on R leg  Distal pulses intact bilaterally   Neurological: She is alert and oriented to person, place, and time. No sensory deficit.   Skin: Skin is warm and dry.   Psychiatric: She has a normal mood and affect. Her behavior is normal. Judgment normal.       Significant Labs: All pertinent labs within the past 24 hours have been reviewed.    Significant Imaging: I have reviewed all pertinent imaging results/findings within the past 24 hours.

## 2019-07-08 NOTE — PROGRESS NOTES
Transported from unit to [facility] via bed by transport, report given to floor nurse

## 2019-07-08 NOTE — PLAN OF CARE
Problem: Adult Inpatient Plan of Care  Goal: Plan of Care Review  Outcome: Ongoing (interventions implemented as appropriate)  HD Tx ended, 1L removed in a 3.5hr Tx, Blood returned via Right Fem cath, ns flush, heplocked, capped, taped. Dsg changed per policy

## 2019-07-09 NOTE — PLAN OF CARE
Problem: Adult Inpatient Plan of Care  Goal: Plan of Care Review  Patient in bed watching television. No compliant of pain at this time. Pain managed with prn meds. Patient  C/o  Constipation, Grace Sleem O.D.notified  An order given to give one time dose of glycerin adult suppository . Bed is locked and in the lowest position. Call light in reach. Will continue to observe and follow the current plan of care.

## 2019-07-09 NOTE — PLAN OF CARE
Problem: Adult Inpatient Plan of Care  Goal: Plan of Care Review  Outcome: Ongoing (interventions implemented as appropriate)  Plan of care reviewed and updated . Pt AA+O . Pt's pain is managed with medication ordered at this timed . Pt's V/S are as charted . No falls this shift. . Pt is oriented to room and call system . Will continue to monitor .

## 2019-07-09 NOTE — SUBJECTIVE & OBJECTIVE
"Principal Problem:Closed fracture of right tibial plateau    Principal Orthopedic Problem: same    Interval History: Patient seen and examined at bedside.  No acute events overnight.  Pain controlled.  Transfers with PT yesterday.  RLE elevated and iced.    Review of patient's allergies indicates:   Allergen Reactions    Flagyl [metronidazole hcl] Nausea And Vomiting    Clindamycin Diarrhea    Metronidazole        Current Facility-Administered Medications   Medication    0.9%  NaCl infusion (for blood administration)    0.9%  NaCl infusion (for blood administration)    acetaminophen tablet 1,000 mg    acetaminophen tablet 650 mg    atorvastatin tablet 40 mg    dextrose 10% (D10W) Bolus    dextrose 10% (D10W) Bolus    epoetin adonay-epbx injection 5,800 Units    famotidine tablet 20 mg    ferrous sulfate EC tablet 325 mg    gabapentin capsule 300 mg    glucagon (human recombinant) injection 1 mg    glucose chewable tablet 16 g    glucose chewable tablet 24 g    heparin (porcine) injection 1,000 Units    HYDROmorphone injection 0.2 mg    insulin aspart U-100 pen 0-5 Units    oxyCODONE immediate release tablet 10 mg    oxyCODONE immediate release tablet 15 mg    oxyCODONE immediate release tablet 5 mg    senna-docusate 8.6-50 mg per tablet 1 tablet    sevelamer carbonate tablet 800 mg    sodium chloride 0.9% flush 10 mL    sodium chloride 0.9% flush 10 mL    vitamin renal formula (B-complex-vitamin c-folic acid) 1 mg per capsule 1 capsule     Objective:     Vital Signs (Most Recent):  Temp: 99.1 °F (37.3 °C) (07/09/19 0425)  Pulse: 87 (07/09/19 0425)  Resp: 19 (07/09/19 0425)  BP: 135/60 (07/09/19 0425)  SpO2: (!) 94 % (07/09/19 0425) Vital Signs (24h Range):  Temp:  [96.7 °F (35.9 °C)-99.1 °F (37.3 °C)] 99.1 °F (37.3 °C)  Pulse:  [80-96] 87  Resp:  [16-19] 19  SpO2:  [94 %-99 %] 94 %  BP: ()/() 135/60     Weight: 90.3 kg (199 lb 1.2 oz)  Height: 5' 7" (170.2 cm)  Body mass index " is 31.18 kg/m².      Intake/Output Summary (Last 24 hours) at 7/9/2019 0607  Last data filed at 7/8/2019 1800  Gross per 24 hour   Intake 1050 ml   Output 1650 ml   Net -600 ml       Ortho/SPM Exam     AAOx4  NAD  Reg rate  No increased WOB     MSK:    Stage one right ischial ulcer without drainage or SOI    RLE:  Knee spanning ex fix in place, pin sites without drainage or SOI  SILT throughout distally  Moderately swollen lower leg - improved since yesterday  [place] edema laterally  Medial proximal tibia with some skin wrinkling  Leg elevated and iced    Significant Labs:   CBC:   Recent Labs   Lab 07/08/19  0356 07/08/19  2147 07/09/19  0352   WBC 9.12 13.64* 10.39   HGB 8.2* 9.7* 8.9*   HCT 28.1* 31.2* 30.1*    183 193     CMP:   Recent Labs   Lab 07/07/19  0637 07/08/19  0356 07/09/19  0352    139 136   K 4.0 4.0 3.4*   CL 98 98 99   CO2 25 22* 21*   * 125* 113*   BUN 21* 27* 17   CREATININE 6.9* 7.8* 5.4*   CALCIUM 10.7* 10.5 10.6*   PROT 8.2 7.8 7.5   ALBUMIN 3.0* 2.8* 2.5*   BILITOT 0.3 0.3 0.4   ALKPHOS 90 89 85   AST 24 34 24   ALT 13 9* <5*   ANIONGAP 17* 19* 16   EGFRNONAA 6.4* 5.5* 8.6*       Significant Imaging: I have reviewed all pertinent imaging results/findings.

## 2019-07-09 NOTE — PROGRESS NOTES
[facility] Hospital Medicine  Progress Note    Patient Name: Jenni Todd  MRN: [id number]  Patient Class: OP- Observation   Admission Date: 7/6/2019  Length of Stay: 0 days  Attending Physician: Fiordaliza Llamas MD  Primary Care Provider: Michael Tan Iii, MD    [facility] Medicine Team: [facility] 2 Kev Villegas MD    Subjective:     Principal Problem:Closed fracture of right tibial plateau      HPI:  Patient is a 49 yo F with PMH of ESRD (on HD MWF), DM2, neuropathy, HTN, HLD, CAD, obesity, bulging discs in back who presented to the ED via ambulance following a fall. Patient was walking down the steps in front of her house and fell down and twisted her R leg. She described her leg pain as a 10/10. Last night, while at dialysis, her nurse could not aspirate her R femoral line, and she was instructed to come to the ER due to a clot in her line. She was on her way out of her house to come to the ER when she fell. At baseline, she uses a walker to get around due to her neuropathy in her feet and has a history of falls. In 2018, she fractured her hip and was operated on by Dr. Grullon.    She has had an extensive history for recurrent vascular access problems. She was on peritoneal dialysis for 14 years prior to February 2019, at which point she developed peritonitis, was admitted, and had her catheter removed and switched to HD. 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 Her last dialysis was on Wednesday, 07/03. She plans for a peritoneal dialysis catheter placement on July 25th with Dr. Tucker at [facility].    In the ED, XRs of her pelvis, knee, femur, tibia, and ankle of her R leg were ordered. R tibia XR revealed a tibial fracture and signs of osteopenia. Ortho and Nephrology were consulted. Her ASA and plavix were held, she was splinted, and Ortho planned for an ex-fix today, which was cleared  by Nephrology.           Overview/Hospital Course:  Patient was admitted to the floor on 07/06 with a R tibial fracture and a clotted R femoral access line. Orthopedic Surgery and Nephrology were consulted from the ED. Ortho operated on the patient on 07/06 and did an ex-fix with plans for definitive correction surgery at a later date. On 07/07, Nephrology flushed alteplase into her access line, and she is scheduled for dialysis treatment. Her pain is well-controlled with Tylenol and Oxycodone with Dilaudid available for breakthrough pain. Her home medications were resumed upon admission, except her midodrine was held prior to surgery due to concerns for falling BPs.  On 07/08, patient underwent HD and 1L of fluid was removed. On 07/09, per Ortho, will see patient in clinic on Monday, 07/15, to evaluate swelling of R knee, and determine then a definitive surgery date. Will look into discharge planning with [facility].    Review of Systems   Constitutional: Negative for activity change, appetite change, chills and fever.   HENT: Negative for sore throat and trouble swallowing.    Respiratory: Negative for cough, choking and shortness of breath.    Cardiovascular: Negative for chest pain, palpitations and leg swelling.   Gastrointestinal: Positive for abdominal distention. Negative for abdominal pain and vomiting.   Genitourinary: Negative for difficulty urinating.   Musculoskeletal: Negative for arthralgias and back pain.        Diffuse R leg pain, better than yesterday   Skin: Negative for rash and wound.   Neurological: Negative for dizziness, light-headedness and headaches.   Psychiatric/Behavioral: Negative for agitation.     Objective:     Vital Signs (Most Recent):  Temp: 99 °F (37.2 °C) (07/09/19 1533)  Pulse: 91 (07/09/19 1533)  Resp: 18 (07/09/19 0828)  BP: (!) 143/67 (07/09/19 1533)  SpO2: 98 % (07/09/19 1533) Vital Signs (24h Range):  Temp:  [97.2 °F (36.2 °C)-99.1 °F (37.3 °C)] 99 °F (37.2 °C)  Pulse:   [84-96] 91  Resp:  [16-19] 18  SpO2:  [94 %-99 %] 98 %  BP: ()/(59-72) 143/67     Weight: 90.3 kg (199 lb 1.2 oz)  Body mass index is 31.18 kg/m².    Physical Exam   Constitutional: She is oriented to person, place, and time. She appears well-developed and well-nourished. No distress.   HENT:   Head: Normocephalic and atraumatic.   Eyes: Conjunctivae and EOM are normal.   Neck: Normal range of motion. Neck supple.   Cardiovascular: Normal rate, regular rhythm and normal heart sounds.   No murmur heard.  Pulmonary/Chest: Effort normal and breath sounds normal. No respiratory distress.   Abdominal: Bowel sounds are normal. She exhibits distension. There is no tenderness. There is no rebound.   Musculoskeletal: She exhibits no edema.   Ex-fix and bandages in place on R leg with leg elevated in the bed on physical exam   Distal pulses intact bilaterally   Neurological: She is alert and oriented to person, place, and time. No sensory deficit.   Skin: Skin is warm and dry.   Psychiatric: She has a normal mood and affect.         CRANIAL NERVES     CN III, IV, VI   Extraocular motions are normal.        Significant Labs: All pertinent labs within the past 24 hours have been reviewed.    Significant Imaging: I have reviewed all pertinent imaging results/findings within the past 24 hours.      Assessment/Plan:      * Closed fracture of right tibial plateau  -07/06, XR showing tibial fracture  -Ortho consulted and following  -Ortho did ex-fix on 07/06 with plans for definitive surgery at a later date. Per Ortho, scheduled to see Ortho on 07/15 in clinic to re-evaluate leg swelling, and determine if will admit for definitive surgery or will hold off.  -PT/OT consulted  -Pain control via tylenol and oxycodone with dilaudid available for breakthrough      ESRD (end stage renal disease) on dialysis  -reports a clot in vascular access line noticed at dialysis on 07/05  -Neprhology consulted and following  -Neprhology flushed  line with alteplase on 07/07  -HD on 07/08. Went well with 1L fluid removal. Needed Midodrine once for low BP.  -renal vitamins    Bulging discs - symptomatic   -gabapentin 300 BID      CAD (coronary artery disease)  -holding home ASA and plavix      Hyperlipidemia  -restart home lipitor 40      Anemia in chronic kidney disease, on chronic dialysis  -Iron 325 mg BID      Neuropathy  -History of falls 2/2 lower extremity neuropathy.  -Patient fell while walking down the steps outside of her house  -Resume home gabapentin 300 BID        VTE Risk Mitigation (From admission, onward)        Ordered     heparin (porcine) injection 5,000 Units  Every 8 hours      07/09/19 1201     heparin (porcine) injection 5,000 Units  Every 8 hours      07/08/19 1415     heparin (porcine) injection 1,000 Units  As needed (PRN)      07/08/19 1210     IP VTE HIGH RISK PATIENT  Once      07/06/19 1458     Place sequential compression device  Until discontinued      07/06/19 1159                Kev Villegas MD  Department of Hospital Medicine   [facility]

## 2019-07-09 NOTE — PLAN OF CARE
07/09/19 1415   Discharge Assessment   Assessment Type Discharge Planning Assessment   Confirmed/corrected address and phone number on facesheet? Yes   Assessment information obtained from? Patient   Expected Length of Stay (days) 5   Communicated expected length of stay with patient/caregiver yes   Prior to hospitilization cognitive status: Alert/Oriented   Prior to hospitalization functional status: Assistive Equipment   Current cognitive status: Alert/Oriented   Current Functional Status: Assistive Equipment   Lives With child(crystal), adult   Able to Return to Prior Arrangements yes   Is patient able to care for self after discharge? Unable to determine at this time (comments)   Who are your caregiver(s) and their phone number(s)? Gina Mckee-[phone]   Patient's perception of discharge disposition skilled nursing facility   Readmission Within the Last 30 Days no previous admission in last 30 days   Patient currently being followed by outpatient case management? No   Patient currently receives home health services? Yes   Patient currently receives any other outside agency services? No   Equipment Currently Used at Home rollator;walker, rolling;bedside commode;wheelchair;bath bench;glucometer   Do you have any problems affording any of your prescribed medications? No   Is the patient taking medications as prescribed? yes   Does the patient have transportation home? Yes   Transportation Anticipated family or friend will provide   Dialysis Name and Scheduled days [facility] M[facility]    Does the patient receive services at the [facility]? No   Discharge Plan A Skilled Nursing Facility   Discharge Plan B Home Health   DME Needed Upon Discharge  none   Patient/Family in Agreement with Plan yes

## 2019-07-09 NOTE — PT/OT/SLP EVAL
Occupational Therapy   Evaluation    Name: Jenni Todd  MRN: [id number]  Admitting Diagnosis:  Closed fracture of right tibial plateau 3 Days Post-Op    Recommendations:     Discharge Recommendations: nursing facility, skilled  Discharge Equipment Recommendations:     Barriers to discharge:  Inaccessible home environment, Decreased caregiver support    Assessment:     Jenni Todd is a 48 y.o. female with a medical diagnosis of Closed fracture of right tibial plateau.  Pt. Found supine in bed and agreeable to therapy. Performance deficits affecting function: weakness, impaired endurance, impaired self care skills, impaired functional mobilty, decreased lower extremity function, orthopedic precautions.      Rehab Prognosis: Good; patient would benefit from acute skilled OT services to address these deficits and reach maximum level of function.       Plan:     Patient to be seen 3 x/week to address the above listed problems via self-care/home management, therapeutic activities, therapeutic exercises  · Plan of Care Expires: 08/09/19  · Plan of Care Reviewed with: patient    Subjective     Chief Complaint: fatigue  Patient/Family Comments/goals: to return home    Occupational Profile:  Living Environment: Pt. Lives with daughter in [facility]. Daughter is physically unable to assist.  Previous level of function: Independent in most adls and functional mobility. Pt. Stated that she has an aide that comes 3x week for bathing assistance.  Equipment Used at Home:  rollator, bedside commode, wheelchair, bath bench  Assistance upon Discharge: Pt. Will not have assistance upon d/c. Daughter is unable to physically assist pt.    Pain/Comfort:  · Pain Rating 1: (not expressed)    Patients cultural, spiritual, Voodoo conflicts given the current situation: no    Objective:     Communicated with: RN prior to session.  Patient found supine with peripheral IV, telemetry, central line upon OT entry to room.    General  Precautions: Standard, fall   Orthopedic Precautions:RLE non weight bearing   Braces: N/A     Occupational Performance:    Bed Mobility:    · Patient completed Rolling/Turning to Right with moderate assistance  · Patient completed Supine to Sit with moderate assistance    Functional Mobility/Transfers:  · Not tested  · Functional Mobility: Pt. Able to sit EOB to perform grooming tasks for ~15 minutes. Pt. Experienced low blood pressure 2x when upright: 74/51 initially sitting up; 85/54 at conclusion of session.     Activities of Daily Living:  · Grooming: supervision sitting EOB - brush teeth, wash face, and apply deodorant. Checking blood pressure throughout session    Cognitive/Visual Perceptual:  Cognitive/Psychosocial Skills:     -       Oriented to: Person, Place, Time and Situation   -       Safety awareness/insight to disability: intact     Physical Exam:  Upper Extremity Range of Motion:     -       Right Upper Extremity: WFL  -       Left Upper Extremity: WFL  Upper Extremity Strength:    -       Right Upper Extremity: WFL  -       Left Upper Extremity: WFL   Strength:    -       Right Upper Extremity: WFL  -       Left Upper Extremity: WFL    AMPAC 6 Click ADL:  AMPAC Total Score: 12    Treatment & Education:  - OT role/POC  - Pt. Educated on the importance of OOB activity to maximize recovery  - Recommend frequent blood pressure checks when seated EOB or standing.   - Call for assistance and/or pain medication    Education:    Patient left supine with all lines intact, call button in reach and RN notified    GOALS:   Multidisciplinary Problems     Occupational Therapy Goals        Problem: Occupational Therapy Goal    Goal Priority Disciplines Outcome Interventions   Occupational Therapy Goal     OT, PT/OT Ongoing (interventions implemented as appropriate)    Description:  Goals to be met by: 8/9/2019    Patient will increase functional independence with ADLs by performing:    UE Dressing with Set-up  Assistance.  LE Dressing with Stand-by Assistance.  Toileting from bedside commode with Supervision for hygiene and clothing management.   Supine to sit with Supervision.    Maria Alejandra Segundo, OT  2019                        History:     Past Medical History:   Diagnosis Date    Abnormal finding on Pap smear, HPV DNA positive     Anemia associated with chronic renal failure     on Epogen    Blood type B+     Bulging discs - symptomatic      CAD (coronary artery disease)     Cardiomyopathy 3/13/2019    Diabetes mellitus, type 2     ESRD (end stage renal disease) 2004    FSGS (focal segmental glomerulosclerosis)     with collapsing    Hyperlipidemia     Hypertension     Neuropathy     NSTEMI (non-ST elevated myocardial infarction) 3/29/2019    Obesity     Secondary hyperparathyroidism, renal     TIA (transient ischemic attack)     Uterine fibroid     small uterine        Past Surgical History:   Procedure Laterality Date    ANGIOGRAM, CORONARY ARTERY N/A 4/15/2019    Performed by Roland [facility]ier [facility] at [facility] CATH LAB    APPLICATION, EXTERNAL FIXATION DEVICE, LARGE, TIBIA- SYNTHES Right 2019    Performed by Corwin Lopez MD at [facility] OR 2ND FLR    CARDIAC CATHETERIZATION      PCI x 2     SECTION, CLASSIC      COLONOSCOPY N/A 2018    Performed by Rodrigue Russell MD at [facility] ENDO (2ND FLR)    DEBRIDEMENT-WOUND Left 2016    Performed by Teressa Maddox MD at [facility] [place] OR    DIALYSIS FISTULA CREATION      multiple fistulas and grafts before PD     EGD (ESOPHAGOGASTRODUODENOSCOPY) N/A 3/14/2019    Performed by Adolph Davila MD at [facility] ENDO    EGD (ESOPHAGOGASTRODUODENOSCOPY) N/A 3/13/2019    Performed by Adolph Davila MD at [facility] ENDO    INCISION AND DRAINAGE (I&D), LABIAL N/A 2016    Performed by Harmony Fernandez MD at [facility] [place] OR    INCISION AND DRAINAGE (I&D), LABIAL (ADD ON) Left 2016    Performed by Teressa Maddox MD at [facility] [place] OR     INCISION AND DRAINAGE OF WOUND Left 2016    VULVAR ABCESS WITH NECROSIS    Insertion, Catheter, Central Venous, Hemodialysis N/A 3/28/2019    Performed by Kimo Weeks MD at [facility] CATH LAB    Insertion, Catheter, Central Venous, Hemodialysis N/A 3/18/2019    Performed by Kimo Weeks MD at [facility] CATH LAB    INSERTION, CATHETER, TUNNELED ABORTED Left 3/14/2019    Performed by Servando Solis MD at [facility] OR    INSERTION, GRAFT, ARTERIOVENOUS, RIGHT ARM Right 3/22/2019    Performed by BESSIE Wynn III, MD at [facility] OR 2ND FLR    INSERTION, INTRA-AORTIC BALLOON PUMP  4/15/2019    Performed by Roland Napier MD at [facility] CATH LAB    Left heart cath Left 4/15/2019    Performed by Roland Napier MD at [facility] CATH LAB    ORIF, HIP Left 9/13/2018    Performed by Tevin Grullon MD at [facility] OR    PERITONEAL CATHETER INSERTION      PERMCATH REWIRE- TUNNELED CATH REWIRE Left 11/13/2017    Performed by Baldev Munroe MD at [facility] CATH LAB    PERMCATH REWIRE- TUNNELED CATH REWIRE N/A 10/5/2017    Performed by Baldev Munroe MD at [facility] CATH LAB    REMOVAL, CATHETER, DIALYSIS, PERITONEAL N/A 3/14/2019    Performed by Servando Solis MD at [facility] OR    REPLACEMENT, CATHETER, DIALYSIS, OVER GUIDEWIRE, USING EXISTING VENOUS ACCESS N/A 6/27/2019    Performed by Kimo Weeks MD at [facility] CATH LAB    REPLACEMENT, CATHETER, DIALYSIS, OVER GUIDEWIRE, USING EXISTING VENOUS ACCESS Left 5/24/2019    Performed by Baldev Munroe MD at [facility] CATH LAB    UMBILICAL HERNIA REPAIR      Venogram, possible intervention Right 3/20/2019    Performed by BESSIE Wynn III, MD at [facility] CATH LAB       Time Tracking:     OT Date of Treatment: 07/09/19  OT Start Time: 1012  OT Stop Time: 1045  OT Total Time (min): 33 min    Billable Minutes:Evaluation 20  Self Care/Home Management 13    Maria Alejandra Raymond, OT  7/9/2019

## 2019-07-09 NOTE — PLAN OF CARE
Problem: Occupational Therapy Goal  Goal: Occupational Therapy Goal  Goals to be met by: 8/9/2019    Patient will increase functional independence with ADLs by performing:    UE Dressing with Set-up Assistance.  LE Dressing with Stand-by Assistance.  Toileting from bedside commode with Supervision for hygiene and clothing management.   Supine to sit with Supervision.    Maria Alejandra Raymond OT  7/9/2019      Outcome: Ongoing (interventions implemented as appropriate)  OT eval complete. OT goals established.    Maria Alejandra Raymond OT  7/9/2019

## 2019-07-09 NOTE — PROGRESS NOTES
[facility]  Orthopedics  Progress Note    Patient Name: Jenni Todd  MRN: [id number]  Admission Date: 7/6/2019  Hospital Length of Stay: 0 days  Attending Provider: Lilia Zurita MD  Primary Care Provider: Michael Tan Iii, MD  Follow-up For: Procedure(s) (LRB):  APPLICATION, EXTERNAL FIXATION DEVICE, LARGE, TIBIA- SYNTHES (Right)    Post-Operative Day: 3 Days Post-Op  Subjective:     Principal Problem:Closed fracture of right tibial plateau    Principal Orthopedic Problem: same    Interval History: Patient seen and examined at bedside.  No acute events overnight.  Pain controlled.  Transfers with PT yesterday.  RLE elevated and iced.    Review of patient's allergies indicates:   Allergen Reactions    Flagyl [metronidazole hcl] Nausea And Vomiting    Clindamycin Diarrhea    Metronidazole        Current Facility-Administered Medications   Medication    0.9%  NaCl infusion (for blood administration)    0.9%  NaCl infusion (for blood administration)    acetaminophen tablet 1,000 mg    acetaminophen tablet 650 mg    atorvastatin tablet 40 mg    dextrose 10% (D10W) Bolus    dextrose 10% (D10W) Bolus    epoetin adonay-epbx injection 5,800 Units    famotidine tablet 20 mg    ferrous sulfate EC tablet 325 mg    gabapentin capsule 300 mg    glucagon (human recombinant) injection 1 mg    glucose chewable tablet 16 g    glucose chewable tablet 24 g    heparin (porcine) injection 1,000 Units    HYDROmorphone injection 0.2 mg    insulin aspart U-100 pen 0-5 Units    oxyCODONE immediate release tablet 10 mg    oxyCODONE immediate release tablet 15 mg    oxyCODONE immediate release tablet 5 mg    senna-docusate 8.6-50 mg per tablet 1 tablet    sevelamer carbonate tablet 800 mg    sodium chloride 0.9% flush 10 mL    sodium chloride 0.9% flush 10 mL    vitamin renal formula (B-complex-vitamin c-folic acid) 1 mg per capsule 1 capsule     Objective:     Vital Signs (Most  "Recent):  Temp: 99.1 °F (37.3 °C) (07/09/19 0425)  Pulse: 87 (07/09/19 0425)  Resp: 19 (07/09/19 0425)  BP: 135/60 (07/09/19 0425)  SpO2: (!) 94 % (07/09/19 0425) Vital Signs (24h Range):  Temp:  [96.7 °F (35.9 °C)-99.1 °F (37.3 °C)] 99.1 °F (37.3 °C)  Pulse:  [80-96] 87  Resp:  [16-19] 19  SpO2:  [94 %-99 %] 94 %  BP: ()/() 135/60     Weight: 90.3 kg (199 lb 1.2 oz)  Height: 5' 7" (170.2 cm)  Body mass index is 31.18 kg/m².      Intake/Output Summary (Last 24 hours) at 7/9/2019 0607  Last data filed at 7/8/2019 1800  Gross per 24 hour   Intake 1050 ml   Output 1650 ml   Net -600 ml       Ortho/SPM Exam     AAOx4  NAD  Reg rate  No increased WOB     MSK:    Stage one right ischial ulcer without drainage or SOI    RLE:  Knee spanning ex fix in place, pin sites without drainage or SOI  SILT throughout distally  Moderately swollen lower leg - improved since yesterday  [place] edema laterally  Medial proximal tibia with some skin wrinkling  Leg elevated and iced    Significant Labs:   CBC:   Recent Labs   Lab 07/08/19  0356 07/08/19  2147 07/09/19 0352   WBC 9.12 13.64* 10.39   HGB 8.2* 9.7* 8.9*   HCT 28.1* 31.2* 30.1*    183 193     CMP:   Recent Labs   Lab 07/07/19  0637 07/08/19  0356 07/09/19 0352    139 136   K 4.0 4.0 3.4*   CL 98 98 99   CO2 25 22* 21*   * 125* 113*   BUN 21* 27* 17   CREATININE 6.9* 7.8* 5.4*   CALCIUM 10.7* 10.5 10.6*   PROT 8.2 7.8 7.5   ALBUMIN 3.0* 2.8* 2.5*   BILITOT 0.3 0.3 0.4   ALKPHOS 90 89 85   AST 24 34 24   ALT 13 9* <5*   ANIONGAP 17* 19* 16   EGFRNONAA 6.4* 5.5* 8.6*       Significant Imaging: I have reviewed all pertinent imaging results/findings.    Assessment/Plan:     * Closed fracture of right tibial plateau  Jenni Todd is a 48 y.o. female with closed Schatzker 6 tibial plateau fracture  - External Fixator placed 7/6/19  - Twice daily pin site care:  Clean pins with 50% peroxide / 50% sterile water solution.  Wrap pins with Kerlix " gauze.  - nephrology following for HD treatments, HD line currently is clotted off - nephrology to interrogate  - DVT ppx - will hold day before definitive surgery once scheduled  - PT/OT daily, NWB RLE  - aggressive ice and elevation RLE  - wound care consulted for stage 1 right ischial decubitus ulcer  - labs: Hb 8.9, Plt 193    Dispo: Patient consented for definitive surgery. She will likely need SNF/Rehab placement.  Needs reduction in swelling.  Continue ice and elevation.  OK for diet today.  Will continue to monitor swelling.          Shyam Cohen MD  Orthopedics  [facility]    Attg Note:  Patient seen and examined.  I agree with the resident's assessment and plan.  Skin improving significantly today.    Dominick Desai MD

## 2019-07-09 NOTE — ASSESSMENT & PLAN NOTE
-07/06, XR showing tibial fracture  -Ortho consulted and following  -Ortho did ex-fix on 07/06 with plans for definitive surgery at a later date. Per Ortho, scheduled to see Ortho on 07/15 in clinic to re-evaluate leg swelling, and determine if will admit for definitive surgery or will hold off.  -PT/OT consulted  -Pain control via tylenol and oxycodone with dilaudid available for breakthrough

## 2019-07-09 NOTE — PT/OT/SLP PROGRESS
Physical Therapy Treatment    Patient Name:  Jenni Todd   MRN:  [id number]    Recommendations:     Discharge Recommendations:  nursing facility, skilled   Discharge Equipment Recommendations: (will determine DME Needs closer to discharge)   Barriers to discharge: Inaccessible home and Decreased caregiver support    Assessment:     Jenni Todd is a 48 y.o. female admitted with a medical diagnosis of Closed fracture of right tibial plateau.  She presents with the following impairments/functional limitations:  weakness, impaired functional mobilty, impaired endurance, gait instability, impaired cardiopulmonary response to activity, impaired sensation, impaired self care skills, decreased lower extremity function, decreased upper extremity function, orthopedic precautions, pain.  Patient motivated to participate in skilled PT with decreased tolerance secondary to orthostatic hypotension.  Patient reports this is a recurrent problem that has limited her since last hospitalization.  BP initially 74/51 mmHg and increased to 85/54 mmHg.  Patient became symptomatic and needed to return to supine following ~ 15 min sitting EOB.      Rehab Prognosis: Fair; patient would benefit from acute skilled PT services to address these deficits and reach maximum level of function.    Recent Surgery: Procedure(s) (LRB):  APPLICATION, EXTERNAL FIXATION DEVICE, LARGE, TIBIA- SYNTHES (Right) 3 Days Post-Op    Plan:     During this hospitalization, patient to be seen 5 x/week to address the identified rehab impairments via therapeutic activities, therapeutic exercises, neuromuscular re-education, wheelchair management/training and progress toward the following goals:    · Plan of Care Expires:  08/05/19    Subjective     Chief Complaint: I am getting a bit dizzy.    Pain/Comfort:  · Pain Rating 1: 5/10  · Location - Side 1: Left  · Location 1: leg  · Pain Addressed 1: Pre-medicate for activity, Reposition, Cessation of Activity,  Nurse notified  · Pain Rating Post-Intervention 1: 6/10      Objective:     Communicated with RN prior to session.  Patient found supine with peripheral IV, telemetry(femoral dialysis catheter) upon PT entry to room.     General Precautions: Standard, fall   Orthopedic Precautions:RLE non weight bearing   Braces: (R LE external fixator)     Functional Mobility:  · Bed Mobility:     · Supine to Sit: moderate assistance and of 2 persons  · Sit to Supine: moderate assistance and of 2 persons      AM-PAC 6 CLICK MOBILITY  Turning over in bed (including adjusting bedclothes, sheets and blankets)?: 2  Sitting down on and standing up from a chair with arms (e.g., wheelchair, bedside commode, etc.): 1  Moving from lying on back to sitting on the side of the bed?: 2  Moving to and from a bed to a chair (including a wheelchair)?: 1  Need to walk in hospital room?: 1  Climbing 3-5 steps with a railing?: 1  Basic Mobility Total Score: 8       Therapeutic Activities and Exercises:   Patient tolerated sitting EOB ~ 15 with focus on seated balance and weight shifting during self care activities.  Patient became lightheaded following increased time sitting.      Patient left supine with all lines intact, call button in reach and RN notified..    GOALS:   Multidisciplinary Problems     Physical Therapy Goals        Problem: Physical Therapy Goal    Goal Priority Disciplines Outcome Goal Variances Interventions   Physical Therapy Goal     PT, PT/OT Ongoing (interventions implemented as appropriate)     Description:  Goals to be met by: 19    Patient will increase functional independence with mobility by performin. Supine to sit with Moderate Assistance -not met  2. Sit to supine with Moderate Assistance -not met  3. Sit to stand transfer with Moderate Assistance with RW -not met  4. Bed to chair transfer with Minimal Assistance using Slideboard -not met                      Time Tracking:     PT Received On: 19  PT  Start Time: 1013     PT Stop Time: 1045  PT Total Time (min): 32 min     Billable Minutes: Therapeutic Activity 32 min    Treatment Type: Treatment  PT/PTA: PT     PTA Visit Number: 0     Jewel Zabala, PT  07/09/2019

## 2019-07-09 NOTE — ASSESSMENT & PLAN NOTE
Jenni Todd is a 48 y.o. female with closed Schatzker 6 tibial plateau fracture  - External Fixator placed 7/6/19  - Twice daily pin site care:  Clean pins with 50% peroxide / 50% sterile water solution.  Wrap pins with Kerlix gauze.  - nephrology following for HD treatments, HD line currently is clotted off - nephrology to interrogate  - DVT ppx - will hold day before definitive surgery once scheduled  - PT/OT daily, NWB RLE  - aggressive ice and elevation RLE  - wound care consulted for stage 1 right ischial decubitus ulcer  - labs: Hb 8.9, Plt 193    Dispo: Patient consented for definitive surgery. She will likely need SNF/Rehab placement.  Needs reduction in swelling.  Continue ice and elevation.  OK for diet today.  Will continue to monitor swelling.

## 2019-07-09 NOTE — PLAN OF CARE
Problem: Physical Therapy Goal  Goal: Physical Therapy Goal  Goals to be met by: 19    Patient will increase functional independence with mobility by performin. Supine to sit with Moderate Assistance -not met  2. Sit to supine with Moderate Assistance -not met  3. Sit to stand transfer with Moderate Assistance with RW -not met  4. Bed to chair transfer with Minimal Assistance using Slideboard -not met     Outcome: Ongoing (interventions implemented as appropriate)  Patient progressing appropriately towards current goals and plan of care remains appropriate at this time.     Jewel Zabala, PT, DPT  2019  Pager #: [phone]

## 2019-07-09 NOTE — SUBJECTIVE & OBJECTIVE
Review of Systems   Constitutional: Negative for activity change, appetite change, chills and fever.   HENT: Negative for sore throat and trouble swallowing.    Respiratory: Negative for cough, choking and shortness of breath.    Cardiovascular: Negative for chest pain, palpitations and leg swelling.   Gastrointestinal: Positive for abdominal distention. Negative for abdominal pain and vomiting.   Genitourinary: Negative for difficulty urinating.   Musculoskeletal: Negative for arthralgias and back pain.        Diffuse R leg pain, better than yesterday   Skin: Negative for rash and wound.   Neurological: Negative for dizziness, light-headedness and headaches.   Psychiatric/Behavioral: Negative for agitation.     Objective:     Vital Signs (Most Recent):  Temp: 99 °F (37.2 °C) (07/09/19 1533)  Pulse: 91 (07/09/19 1533)  Resp: 18 (07/09/19 0828)  BP: (!) 143/67 (07/09/19 1533)  SpO2: 98 % (07/09/19 1533) Vital Signs (24h Range):  Temp:  [97.2 °F (36.2 °C)-99.1 °F (37.3 °C)] 99 °F (37.2 °C)  Pulse:  [84-96] 91  Resp:  [16-19] 18  SpO2:  [94 %-99 %] 98 %  BP: ()/(59-72) 143/67     Weight: 90.3 kg (199 lb 1.2 oz)  Body mass index is 31.18 kg/m².    Physical Exam   Constitutional: She is oriented to person, place, and time. She appears well-developed and well-nourished. No distress.   HENT:   Head: Normocephalic and atraumatic.   Eyes: Conjunctivae and EOM are normal.   Neck: Normal range of motion. Neck supple.   Cardiovascular: Normal rate, regular rhythm and normal heart sounds.   No murmur heard.  Pulmonary/Chest: Effort normal and breath sounds normal. No respiratory distress.   Abdominal: Bowel sounds are normal. She exhibits distension. There is no tenderness. There is no rebound.   Musculoskeletal: She exhibits no edema.   Ex-fix and bandages in place on R leg with leg elevated in the bed on physical exam   Distal pulses intact bilaterally   Neurological: She is alert and oriented to person, place, and time.  No sensory deficit.   Skin: Skin is warm and dry.   Psychiatric: She has a normal mood and affect.         CRANIAL NERVES     CN III, IV, VI   Extraocular motions are normal.        Significant Labs: All pertinent labs within the past 24 hours have been reviewed.    Significant Imaging: I have reviewed all pertinent imaging results/findings within the past 24 hours.

## 2019-07-10 NOTE — ASSESSMENT & PLAN NOTE
-reports a clot in vascular access line noticed at dialysis on 07/05  -Neprhology consulted and following  -Neprhology flushed line with alteplase on 07/07  -HD on 07/08. Went well with 1L fluid removal. Needed Midodrine once for low BP.  -HD on 07/10. Went well with 1.5L fluid removal. Received Midodrine once for low BP.  -renal vitamins

## 2019-07-10 NOTE — PT/OT/SLP PROGRESS
"Physical Therapy Treatment    Patient Name:  Jenni Todd   MRN:  [id number]    Recommendations:     Discharge Recommendations:  nursing facility, skilled   Discharge Equipment Recommendations: none   Barriers to discharge: Inaccessible home and Decreased caregiver support    Assessment:     Jenni Todd is a 48 y.o. female admitted with a medical diagnosis of Closed fracture of right proximal tibia and pertinent PMHx and surgical history including orthostatic hypotension.  She presents with the following impairments/functional limitations:  weakness, decreased upper extremity function, decreased ROM, impaired balance, pain, decreased lower extremity function, impaired functional mobilty.      Patient demonstrates good motivation and fair activity tolerance secondary greatly increased pain and hypotension.  Patient with increased hypotension this afternoon following HD and as a result pain medication needed to be deferred.  As a result, patient with decreased tolerance and only able to tolerate repositioning and LE exercises.      Rehab Prognosis: Fair; patient continues to benefit from acute skilled PT services to address these deficits and reach maximum level of function.  Patient remains most appropriate to discharge to nursing facility, skilled  Recent Surgery: Procedure(s) (LRB):  APPLICATION, EXTERNAL FIXATION DEVICE, LARGE, TIBIA- SYNTHES (Right) 4 Days Post-Op    Plan:     During this hospitalization, patient to be seen 5 x/week to address the identified rehab impairments via therapeutic activities, therapeutic exercises, neuromuscular re-education, wheelchair management/training and progress toward the following goals:    · Plan of Care Expires:  08/05/19    Subjective     Subjective: "I am hurting so much."  Pain/Comfort:  · Pain Rating 1: 7/10  · Location - Side 1: Right  · Location - Orientation 1: generalized  · Location 1: leg  · Pain Addressed 1: Nurse notified, Cessation of Activity, " Reposition  · Pain Rating Post-Intervention 1: 6/10      Objective:     Communicated with RN prior to session.  Patient found supine with peripheral IV, telemetry, central line upon PT entry to room.     General Precautions: Standard, fall   Orthopedic Precautions:RLE non weight bearing   Braces: (ex-fix)     Functional Mobility:  · Bed Mobility:  Rolling Left:  contact guard assistance  · Scooting: maximal assistance      AM-PAC 6 CLICK MOBILITY  Turning over in bed (including adjusting bedclothes, sheets and blankets)?: 2  Sitting down on and standing up from a chair with arms (e.g., wheelchair, bedside commode, etc.): 1  Moving from lying on back to sitting on the side of the bed?: 2  Moving to and from a bed to a chair (including a wheelchair)?: 1  Need to walk in hospital room?: 1  Climbing 3-5 steps with a railing?: 1  Basic Mobility Total Score: 8       Therapeutic Activities and Exercises:   TRAVIS Exercises: Patient performed 1 set(s) of 10 reps of the following exercises: ankle pumps, quad sets, glut sets and quad sets only done on L LE   Patient only able to tolerate repositioning in bed secondary to pain.      Patient Education:    Patient educated on Fall risk, Home exercise program and risks of prolonged bed rest by explanation.  Patient was receptive to education and needs reinforcement.   Patient left supine with all lines intact and call button in reach.    GOALS:   Multidisciplinary Problems     Physical Therapy Goals        Problem: Physical Therapy Goal    Goal Priority Disciplines Outcome Goal Variances Interventions   Physical Therapy Goal     PT, PT/OT Ongoing (interventions implemented as appropriate)     Description:  Goals to be met by: 19    Patient will increase functional independence with mobility by performin. Supine to sit with Moderate Assistance -not met  2. Sit to supine with Moderate Assistance -not met  3. Sit to stand transfer with Moderate Assistance with RW -not  met  4. Bed to chair transfer with Minimal Assistance using Slideboard -not met                      Time Tracking:     PT Received On: 07/10/19  PT Start Time: 1448     PT Stop Time: 1500  PT Total Time (min): 12 min     Billable Minutes: Therapeutic Exercise 12 min    Treatment Type: Treatment  PT/PTA: PT     PTA Visit Number: 0     Jewel Zabala, PT  07/10/2019

## 2019-07-10 NOTE — NURSING
Pt off the floor for dialysis accompanied by 2 transport techs. /67, 99.2, 92bpm, 17RR, 100% sats.  Report given to dialysis RN.

## 2019-07-10 NOTE — SUBJECTIVE & OBJECTIVE
Review of Systems   Constitutional: Negative for activity change, appetite change, chills and fever.   HENT: Negative for sore throat and trouble swallowing.    Respiratory: Negative for cough, choking and shortness of breath.    Cardiovascular: Negative for chest pain, palpitations and leg swelling.   Gastrointestinal: Positive for abdominal distention. Negative for abdominal pain and vomiting.   Genitourinary: Negative for difficulty urinating.   Musculoskeletal: Negative for arthralgias and back pain.        Diffuse R leg pain, better than yesterday   Skin: Negative for rash and wound.   Neurological: Negative for dizziness, light-headedness and headaches.   Psychiatric/Behavioral: Negative for agitation.     Objective:     Vital Signs (Most Recent):  Temp: 98.7 °F (37.1 °C) (07/10/19 0815)  Pulse: 88 (07/10/19 1200)  Resp: 16 (07/10/19 1100)  BP: (!) 81/56 (07/10/19 1415)  SpO2: 100 % (07/10/19 0701) Vital Signs (24h Range):  Temp:  [98 °F (36.7 °C)-99.2 °F (37.3 °C)] 98.7 °F (37.1 °C)  Pulse:  [81-98] 88  Resp:  [14-20] 16  SpO2:  [97 %-100 %] 100 %  BP: ()/(48-79) 81/56     Weight: 90.3 kg (199 lb 1.2 oz)  Body mass index is 31.18 kg/m².    Physical Exam   Constitutional: She is oriented to person, place, and time. She appears well-developed and well-nourished. No distress.   HENT:   Head: Normocephalic and atraumatic.   Eyes: Conjunctivae and EOM are normal.   Neck: Normal range of motion. Neck supple.   Cardiovascular: Normal rate, regular rhythm and normal heart sounds.   No murmur heard.  Pulmonary/Chest: Effort normal and breath sounds normal. No respiratory distress.   Abdominal: Bowel sounds are normal. She exhibits distension. There is no tenderness. There is no rebound.   Musculoskeletal: She exhibits no edema.   Ex-fix and bandages in place on R leg with leg elevated in the bed on physical exam   Distal pulses intact bilaterally   Neurological: She is alert and oriented to person, place, and  time. No sensory deficit.   Skin: Skin is warm and dry.   Psychiatric: She has a normal mood and affect.         CRANIAL NERVES     CN III, IV, VI   Extraocular motions are normal.        Significant Labs: All pertinent labs within the past 24 hours have been reviewed.    Significant Imaging: I have reviewed all pertinent imaging results/findings within the past 24 hours.

## 2019-07-10 NOTE — ANESTHESIA PREPROCEDURE EVALUATION
[facility]  Anesthesia Pre-Operative Evaluation         Patient Name: Jenni Todd  YOB: 1970  MRN: [id number]    SUBJECTIVE:     Pre-operative evaluation for Procedure(s) (LRB):  ORIF, FRACTURE, TIBIA, PLATEAU - trios - 2 large c arms clock and door side - need paralysis (Right)     07/10/2019    Jenni Todd is a 48 y.o. female w/ a significant PMHx ESRD (on HD MWF), CAD (previous NSTEMI), HFrEF (EF 25%, DD), DM2, HTN, HLD, obesity, and chronic back pain who presents w/ R tibial fracture following a fall. Of note, uses walker at baseline due to neuropathy.     Patient now presents for the above procedure(s).      LDA: None documented.    Prev airway: ETT by DL 7/6/19, grade 1, easy mask, no complications    Patient Active Problem List   Diagnosis    Neuropathy    ESRD (end stage renal disease) on PD ( initiated dialysis 04/20/2004)    FSGS (focal segmental glomerulosclerosis) biopsy proven with collapsing features    Anemia in chronic kidney disease, on chronic dialysis    Renovascular hypertension    Hyperlipidemia    Obesity    CAD (coronary artery disease)    Type 2 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 and hypertension    Awaiting organ transplant status    Bulging discs - symptomatic     Spinal stenosis of sacral and sacrococcygeal region    Hypertensive renal disease    Hypertension associated with diabetes    Hypoalbuminemia    Cerebral infarction due to embolism of middle cerebral artery    Gait abnormality    Chronic low back pain    DDD (degenerative disc disease), lumbar    PAD (peripheral artery disease)    ESRD (end stage renal disease) on dialysis    Gastrointestinal hemorrhage with hematemesis    Acute blood loss anemia    Peritonitis associated with peritoneal dialysis    Peritonitis due to infected peritoneal dialysis catheter    Abdominal pain    Occult GI bleeding    Intra-dialytic hypotension    Nausea and  vomiting    Cardiomyopathy    Chronic combined systolic and diastolic heart failure    Acute gastric ulcer with hemorrhage    Antibiotic-associated diarrhea    Hemodialysis catheter dysfunction    Pressure injury of left buttock, stage 2    Delayed surgical wound healing    Other specified anemias    NSTEMI (non-ST elevated myocardial infarction)    Complication of vascular access for dialysis    Multiple wounds    Pressure injury of sacral region, stage 2    3-vessel coronary artery disease    Problem with dialysis access    Permanent central venous catheter in place    Hypervolemia    Acute on chronic combined systolic and diastolic heart failure    Chronic right-sided heart failure    Moderate to severe pulmonary hypertension    Diabetic polyneuropathy associated with type 2 diabetes mellitus    At high risk for imbalanced nutrition    Upper respiratory infection    Rhinitis    Complication of vascular dialysis catheter    Dialysis patient    Ascites    Congestive heart failure    Problem with vascular access    Hypotension    Type 2 diabetes mellitus with hyperglycemia, with long-term current use of insulin    Closed fracture of right proximal tibia       Review of patient's allergies indicates:   Allergen Reactions    Flagyl [metronidazole hcl] Nausea And Vomiting    Clindamycin Diarrhea    Metronidazole        Current Outpatient Medications:    Current Facility-Administered Medications:     0.9%  NaCl infusion (for blood administration), , Intravenous, Q24H PRN, Shyam Cohen MD    0.9%  NaCl infusion (for blood administration), , Intravenous, Q24H PRN, Shyam Cohen MD    0.9%  NaCl infusion, , Intravenous, Once, Dusty Scruggs NP, Stopped at 07/10/19 0000    acetaminophen tablet 1,000 mg, 1,000 mg, Oral, Q8H PRN, Eliel Pihllips MD    acetaminophen tablet 650 mg, 650 mg, Oral, Q6H, Kev Casas MD, 650 mg at 07/10/19 0357    atorvastatin tablet  40 mg, 40 mg, Oral, QHS, Eliel Phillips MD, 40 mg at 07/09/19 2110    dextrose 10% (D10W) Bolus, 12.5 g, Intravenous, PRN, Eliel Phillips MD    dextrose 10% (D10W) Bolus, 25 g, Intravenous, PRN, Eliel Phillips MD    epoetin adonay-epbx injection 5,800 Units, 5,800 Units, Intravenous, Every Mon, Wed, Fri, Dusty Scruggs, NP, 5,800 Units at 07/10/19 1042    famotidine tablet 20 mg, 20 mg, Oral, Nightly PRN, Eliel Phillips MD    ferrous sulfate EC tablet 325 mg, 325 mg, Oral, Daily, Eliel Phillips MD, 325 mg at 07/09/19 0844    gabapentin capsule 300 mg, 300 mg, Oral, BID, Eliel Phillips MD, 300 mg at 07/09/19 2110    glucagon (human recombinant) injection 1 mg, 1 mg, Intramuscular, PRN, Eliel Phillips MD    glucose chewable tablet 16 g, 16 g, Oral, PRN, Eliel Phillips MD    glucose chewable tablet 24 g, 24 g, Oral, PRN, Eliel Phillips MD    heparin (porcine) injection 1,000 Units, 1,000 Units, Intra-Catheter, PRN, Dusty Scruggs, NP, 1,000 Units at 07/10/19 1044    heparin (porcine) injection 5,000 Units, 5,000 Units, Subcutaneous, Q8H, Shyam Cohen MD    HYDROmorphone injection 0.2 mg, 0.2 mg, Intravenous, Q3H PRN, Kev Casas MD, 0.2 mg at 07/06/19 1820    insulin aspart U-100 pen 0-5 Units, 0-5 Units, Subcutaneous, QID (AC + HS) PRN, Eliel Phillips MD    oxyCODONE immediate release tablet 10 mg, 10 mg, Oral, Q4H PRN, Kev Casas MD    oxyCODONE immediate release tablet 15 mg, 15 mg, Oral, Q4H PRN, Kev Casas MD, 15 mg at 07/10/19 0358    oxyCODONE immediate release tablet 5 mg, 5 mg, Oral, Q4H PRN, Kev Casas MD    senna tablet 8.6 mg, 8.6 mg, Oral, QHS, Fiordaliza Llamas MD    sevelamer carbonate tablet 800 mg, 800 mg, Oral, TID WM, Eliel Phillips MD, 800 mg at 07/08/19 1948    sodium chloride 0.9% flush 10 mL, 10 mL, Intravenous, PRN, Taz Veras MD    sodium chloride 0.9% flush 10 mL, 10 mL, Intravenous, PRN, Eliel  MD Alan    vitamin renal formula (B-complex-vitamin c-folic acid) 1 mg per capsule 1 capsule, 1 capsule, Oral, Daily, Eliel Phillips MD, 1 capsule at 19 1231    Past Surgical History:   Procedure Laterality Date    ANGIOGRAM, CORONARY ARTERY N/A 4/15/2019    Performed by Roland Napier MD at [facility] CATH LAB    APPLICATION, EXTERNAL FIXATION DEVICE, LARGE, TIBIA- SYNTHES Right 2019    Performed by Corwin Lopez MD at [facility] OR 2ND FLR    CARDIAC CATHETERIZATION      PCI x 2     SECTION, CLASSIC      COLONOSCOPY N/A 2018    Performed by Rodrigue Russell MD at [facility] ENDO (2ND FLR)    DEBRIDEMENT-WOUND Left 2016    Performed by Teressa Maddox MD at [facility] [place]    DIALYSIS FISTULA CREATION      multiple fistulas and grafts before PD     EGD (ESOPHAGOGASTRODUODENOSCOPY) N/A 3/14/2019    Performed by Adolph Davila MD at [facility] ENDO    EGD (ESOPHAGOGASTRODUODENOSCOPY) N/A 3/13/2019    Performed by Adolph Davila MD at [facility] ENDO    INCISION AND DRAINAGE (I&D), LABIAL N/A 2016    Performed by Harmony Fernandez MD at [facility] [place]    INCISION AND DRAINAGE (I&D), LABIAL (ADD ON) Left 2016    Performed by Teressa Maddox MD at [facility] [place]    INCISION AND DRAINAGE OF WOUND Left     VULVAR ABCESS WITH NECROSIS    Insertion, Catheter, Central Venous, Hemodialysis N/A 3/28/2019    Performed by Kimo Weeks MD at [facility] CATH LAB    Insertion, Catheter, Central Venous, Hemodialysis N/A 3/18/2019    Performed by Kimo Weeks MD at [facility] CATH LAB    INSERTION, CATHETER, TUNNELED ABORTED Left 3/14/2019    Performed by Servando Solis MD at [facility] OR    INSERTION, GRAFT, ARTERIOVENOUS, RIGHT ARM Right 3/22/2019    Performed by BESSIE Wynn III, MD at [facility] OR 2ND FLR    INSERTION, INTRA-AORTIC BALLOON PUMP  4/15/2019    Performed by Roland Napier MD at [facility] CATH LAB    Left heart cath Left 4/15/2019    Performed by Roland Napier MD at [facility]  CATH LAB    ORIF, HIP Left 9/13/2018    Performed by Tevin Grullon MD at [facility] OR    PERITONEAL CATHETER INSERTION      PERMCATH REWIRE- TUNNELED CATH REWIRE Left 11/13/2017    Performed by Baldev Munroe MD at [facility] CATH LAB    PERMCATH REWIRE- TUNNELED CATH REWIRE N/A 10/5/2017    Performed by Baldev Munroe MD at [facility] CATH LAB    REMOVAL, CATHETER, DIALYSIS, PERITONEAL N/A 3/14/2019    Performed by [facility] at [facility] OR    REPLACEMENT, CATHETER, DIALYSIS, OVER GUIDEWIRE, USING EXISTING VENOUS ACCESS N/A 6/27/2019    Performed by Kimo Weeks MD at [facility] CATH LAB    REPLACEMENT, CATHETER, DIALYSIS, OVER GUIDEWIRE, USING EXISTING VENOUS ACCESS Left 5/24/2019    Performed by Baldev Munroe MD at [facility] CATH LAB    UMBILICAL HERNIA REPAIR      Venogram, possible intervention Right 3/20/2019    Performed by BESSIE Wynn III, MD at [facility] CATH LAB       Social History     Socioeconomic History    Marital status: Single     Spouse name: Not on file    Number of children: Not on file    Years of education: Not on file    Highest education level: Not on file   Occupational History     Employer: [employer]    Financial resource strain: Not on file    Food insecurity:     Worry: Not on file     Inability: Not on file    Transportation needs:     Medical: Not on file     Non-medical: Not on file   Tobacco Use    Smoking status: Never Smoker    Smokeless tobacco: Never Used   Substance and Sexual Activity    Alcohol use: No     Comment: Pt reports some social use of about 1-2 drinks about every six months.    Drug use: No    Sexual activity: Not Currently     Partners: Male     Birth control/protection: None   Lifestyle    Physical activity:     Days per week: Not on file     Minutes per session: Not on file    Stress: Not on file   Relationships    Social connections:     Talks on phone: Not on file     Gets together: Not on file     Attends Anabaptist  service: Not on file     Active member of club or organization: Not on file     Attends meetings of clubs or organizations: Not on file     Relationship status: Not on file   Other Topics Concern    Not on file   Social History Narrative     [place]    Single    One child    History of blood transfusion in 2004    Potential donor ??? brother       OBJECTIVE:     Vital Signs Range (Last 24H):  Temp:  [36.7 °C (98 °F)-37.3 °C (99.2 °F)]   Pulse:  [81-98]   Resp:  [14-20]   BP: ()/(48-79)   SpO2:  [97 %-100 %]       Significant Labs:  Lab Results   Component Value Date    WBC 11.68 07/10/2019    HGB 9.5 (L) 07/10/2019    HCT 31.4 (L) 07/10/2019     07/10/2019    CHOL 199 02/24/2017    TRIG 460 (H) 02/24/2017    HDL 19 (L) 02/24/2017    ALT <5 (L) 07/10/2019    AST 21 07/10/2019     (L) 07/10/2019    K 3.9 07/10/2019    CL 99 07/10/2019    CREATININE 6.9 (H) 07/10/2019    BUN 25 (H) 07/10/2019    CO2 20 (L) 07/10/2019    TSH 2.896 06/12/2019    INR 1.0 07/06/2019    HGBA1C 5.3 07/06/2019       Diagnostic Studies: No relevant studies.    EKG:   Vent. Rate : 060 BPM     Atrial Rate : 060 BPM     P-R Int : 188 ms          QRS Dur : 090 ms      QT Int : 394 ms       P-R-T Axes : 063 062 138 degrees     QTc Int : 394 ms    Normal sinus rhythm  Nonspecific T wave abnormality  Abnormal ECG  When compared with ECG of 06-JUL-2019 07:59,  No significant change was found  Confirmed by Nika Barragan MD ([id number]) on 7/6/2019 11:26:37 AM    2D ECHO 4/14/19:  · Severely decreased left ventricular systolic function. The estimated ejection fraction is 25%  · Left ventricular diastolic dysfunction.  · Local segmental wall motion abnormalities.  · Low normal right ventricular systolic function.  · Severe left atrial enlargement.  · Mild-to-moderate aortic valve stenosis.  · Aortic valve area is 1.48 cm2; peak velocity is 1.91 m/s; mean gradient is 10.27 mmHg.  · Moderate mitral sclerosis.  · Moderate to  moderate-severe (2-3+) mitral regurgitation.  · Mild to moderate tricuspid regurgitation.  · Mild pulmonic regurgitation.  · Normal central venous pressure (3 mm Hg).  · The estimated PA systolic pressure is 32 mm Hg      ASSESSMENT/PLAN:       Anesthesia Evaluation    I have reviewed the Patient Summary Reports.    I have reviewed the Nursing Notes.   I have reviewed the Medications.     Review of Systems  Anesthesia Hx:  History of prior surgery of interest to airway management or planning: Denies Family Hx of Anesthesia complications.   Denies Personal Hx of Anesthesia complications.       Physical Exam  General:  Well nourished, Obesity    Airway/Jaw/Neck:  Airway Findings: Mouth Opening: Normal Tongue: Normal  General Airway Assessment: Adult  Mallampati: II  TM Distance: Normal, at least 6 cm  Jaw/Neck Findings:  Micrognathia: Negative Limited Ability to Prognath  Neck ROM: Normal ROM  Neck Findings:  Girth Increased      Dental:  Dental Findings: In tact   Chest/Lungs:  Chest/Lungs Findings: Clear to auscultation, Normal Respiratory Rate     Heart/Vascular:  Heart Findings: Rate: Normal  Rhythm: Regular Rhythm  Sounds: Normal     Abdomen:  Abdomen Findings:  Normal     Musculoskeletal:  Musculoskeletal Findings:    Skin:  Skin Findings:     Mental Status:  Mental Status Findings:  Alert and Oriented, Cooperative         Anesthesia Plan  Type of Anesthesia, risks & benefits discussed:  Anesthesia Type:  general  Patient's Preference:   Intra-op Monitoring Plan: standard ASA monitors  Intra-op Monitoring Plan Comments:   Post Op Pain Control Plan: multimodal analgesia, IV/PO Opioids PRN and per primary service following discharge from PACU  Post Op Pain Control Plan Comments:   Induction:   IV  Beta Blocker:  Patient is not currently on a Beta-Blocker (No further documentation required).       Informed Consent: Patient understands risks and agrees with Anesthesia plan.  Questions answered. Anesthesia consent  signed with patient.  ASA Score: 4     Day of Surgery Review of History & Physical:    H&P update referred to the surgeon.         Ready For Surgery From Anesthesia Perspective.

## 2019-07-10 NOTE — PROGRESS NOTES
HD completed , net uf 1500 ml . Blood returned  And  And catheter ports flushed with noraml saline and heparin instilled into each port as indicated. Ports capped and secured.  Vital stable . B/ p 108/64 pulse 109

## 2019-07-10 NOTE — PROGRESS NOTES
HD initiated,  Adequate blood flow from ted cath right groin. /  as ordered. uf goal; set for net of 2000ml as tolerated.. Patient alert and voice no complaints at this time.

## 2019-07-10 NOTE — ASSESSMENT & PLAN NOTE
-07/06, XR showing tibial fracture  -Ortho consulted and following  -PT/OT consulted  -Ortho did ex-fix on 07/06 with plans for definitive surgery at a later date  -Ortho plans for definitive surgery on 07/11  -NPO tonight  -Pain control via tylenol and oxycodone with dilaudid available for breakthrough

## 2019-07-10 NOTE — PROGRESS NOTES
[facility] Hospital Medicine  Progress Note    Patient Name: Jenni Todd  MRN: [id number]  Patient Class: OP- Observation   Admission Date: 7/6/2019  Length of Stay: 0 days  Attending Physician: Fiordaliza Llamas MD  Primary Care Provider: Michael Tan Iii, MD    [facility] Medicine Team: [facility] [place] HOSP MED 2 Kev Villegas MD    Subjective:     Principal Problem:Closed fracture of right proximal tibia      HPI:  Patient is a 47 yo F with PMH of ESRD (on HD MWF), DM2, neuropathy, HTN, HLD, CAD, obesity, bulging discs in back who presented to the ED via ambulance following a fall. Patient was walking down the steps in front of her house and fell down and twisted her R leg. She described her leg pain as a 10/10. Last night, while at dialysis, her nurse could not aspirate her R femoral line, and she was instructed to come to the ER due to a clot in her line. She was on her way out of her house to come to the ER when she fell. At baseline, she uses a walker to get around due to her neuropathy in her feet and has a history of falls. In 2018, she fractured her hip and was operated on by Dr. Grullon.    She has had an extensive history for recurrent vascular access problems. She was on peritoneal dialysis for 14 years prior to February 2019, at which point she developed peritonitis, was admitted, and had her catheter removed and switched to HD. 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 Her last dialysis was on Wednesday, 07/03. She plans for a peritoneal dialysis catheter placement on July 25th with Dr. Tucker at [facility].    In the ED, XRs of her pelvis, knee, femur, tibia, and ankle of her R leg were ordered. R tibia XR revealed a tibial fracture and signs of osteopenia. Ortho and Nephrology were consulted. Her ASA and plavix were held, she was splinted, and Ortho planned for an ex-fix today, which was cleared  by Nephrology.           Overview/Hospital Course:  Patient was admitted to the floor on 07/06 with a R tibial fracture and a clotted R femoral access line. Orthopedic Surgery and Nephrology were consulted from the ED. Ortho operated on the patient on 07/06 and did an ex-fix with plans for definitive correction surgery at a later date. On 07/07, Nephrology flushed alteplase into her access line, and she is scheduled for dialysis treatment. Her pain is well-controlled with Tylenol and Oxycodone with Dilaudid available for breakthrough pain. Her home medications were resumed upon admission, except her midodrine was held prior to surgery due to concerns for falling BPs.  On 07/08, patient underwent HD and 1L of fluid was removed. On 07/09, Ortho evaluated the swelling of her R knee, and decided to not operate. On 07/10, patient went to HD at which 1.5L of fluid was removed. Her BP dropped to 81/56 following dialysis, and a dose of midodrine 10 mg was ordered. Patient is consented and Ortho plans for definitive surgery on 07/11. She will be placed NPO tonight in preparation for her surgery.     Review of Systems   Constitutional: Negative for activity change, appetite change, chills and fever.   HENT: Negative for sore throat and trouble swallowing.    Respiratory: Negative for cough, choking and shortness of breath.    Cardiovascular: Negative for chest pain, palpitations and leg swelling.   Gastrointestinal: Positive for abdominal distention. Negative for abdominal pain and vomiting.   Genitourinary: Negative for difficulty urinating.   Musculoskeletal: Negative for arthralgias and back pain.        Diffuse R leg pain, better than yesterday   Skin: Negative for rash and wound.   Neurological: Negative for dizziness, light-headedness and headaches.   Psychiatric/Behavioral: Negative for agitation.     Objective:     Vital Signs (Most Recent):  Temp: 98.7 °F (37.1 °C) (07/10/19 0815)  Pulse: 88 (07/10/19 1200)  Resp: 16  (07/10/19 1100)  BP: (!) 81/56 (07/10/19 1415)  SpO2: 100 % (07/10/19 0701) Vital Signs (24h Range):  Temp:  [98 °F (36.7 °C)-99.2 °F (37.3 °C)] 98.7 °F (37.1 °C)  Pulse:  [81-98] 88  Resp:  [14-20] 16  SpO2:  [97 %-100 %] 100 %  BP: ()/(48-79) 81/56     Weight: 90.3 kg (199 lb 1.2 oz)  Body mass index is 31.18 kg/m².    Physical Exam   Constitutional: She is oriented to person, place, and time. She appears well-developed and well-nourished. No distress.   HENT:   Head: Normocephalic and atraumatic.   Eyes: Conjunctivae and EOM are normal.   Neck: Normal range of motion. Neck supple.   Cardiovascular: Normal rate, regular rhythm and normal heart sounds.   No murmur heard.  Pulmonary/Chest: Effort normal and breath sounds normal. No respiratory distress.   Abdominal: Bowel sounds are normal. She exhibits distension. There is no tenderness. There is no rebound.   Musculoskeletal: She exhibits no edema.   Ex-fix and bandages in place on R leg with leg elevated in the bed on physical exam   Distal pulses intact bilaterally   Neurological: She is alert and oriented to person, place, and time. No sensory deficit.   Skin: Skin is warm and dry.   Psychiatric: She has a normal mood and affect.         CRANIAL NERVES     CN III, IV, VI   Extraocular motions are normal.        Significant Labs: All pertinent labs within the past 24 hours have been reviewed.    Significant Imaging: I have reviewed all pertinent imaging results/findings within the past 24 hours.      Assessment/Plan:      * Closed fracture of right proximal tibia  -07/06, XR showing tibial fracture  -Ortho consulted and following  -PT/OT consulted  -Ortho did ex-fix on 07/06 with plans for definitive surgery at a later date  -Ortho plans for definitive surgery on 07/11  -NPO tonight  -Pain control via tylenol and oxycodone with dilaudid available for breakthrough      ESRD (end stage renal disease) on dialysis  -reports a clot in vascular access line  noticed at dialysis on 07/05  -Neprhology consulted and following  -Neprhology flushed line with alteplase on 07/07  -HD on 07/08. Went well with 1L fluid removal. Needed Midodrine once for low BP.  -HD on 07/10. Went well with 1.5L fluid removal. Received Midodrine once for low BP.  -renal vitamins    Bulging discs - symptomatic   -gabapentin 300 BID      CAD (coronary artery disease)  -holding home ASA and plavix      Hyperlipidemia  -restart home lipitor 40      Anemia in chronic kidney disease, on chronic dialysis  -Iron 325 mg BID      Neuropathy  -History of falls 2/2 lower extremity neuropathy.  -Patient fell while walking down the steps outside of her house  -Resume home gabapentin 300 BID        VTE Risk Mitigation (From admission, onward)        Ordered     heparin (porcine) injection 5,000 Units  Every 8 hours      07/10/19 1134     heparin (porcine) injection 5,000 Units  Every 8 hours      07/09/19 1803     heparin (porcine) injection 5,000 Units  Every 8 hours      07/08/19 1415     heparin (porcine) injection 1,000 Units  As needed (PRN)      07/08/19 1210     IP VTE HIGH RISK PATIENT  Once      07/06/19 1458     Place sequential compression device  Until discontinued      07/06/19 1159                Kev Villegas MD  Department of Hospital Medicine   [facility]

## 2019-07-10 NOTE — ASSESSMENT & PLAN NOTE
Jenni Todd is a 48 y.o. female with closed Schatzker 6 tibial plateau fracture  - External Fixator placed 7/6/19  - Twice daily pin site care:  Clean pins with 50% peroxide / 50% sterile water solution.  Wrap pins with Kerlix gauze.  - nephrology following for HD treatments  - DVT ppx - holding currently for possible surgery today  - PT/OT daily, NWB RLE  - aggressive ice and elevation RLE  - wound care consulted for stage 1 right ischial decubitus ulcer  - labs: pending    Dispo: Patient consented for definitive surgery. Swelling improved.  Continue ice and elevation.  Keep NPO.  Will discuss with staff surgical timing - possibly this afternoon after early morning dialysis.

## 2019-07-10 NOTE — PROGRESS NOTES
[facility] H[facility]OD[facility] NOTE     Patient currently on hemodialysis for removal of uremic toxins and volume.     Patient seen and evaluated on hemodialysis, tolerating treatment, see HD flowsheet for vitals and assessments.      Ultrafiltration goal is 2-3L as tolerated, keep map >65     Labs have been reviewed and the dialysate bath has been adjusted.     Assessment/Plan:    -Patient seen on HD, tolerating treatment well, w/o complaints   -Renal diet  -Strict I/O's and daily weights  -Daily renal function panels  -Hbg 8.9, continue Epo with HD  -Phos 4.8, continue renvela with meals   -Will continue to follow while inpatient       BERNICE Polanco, AGNP-C  Nephrology  Pager:  [phone]

## 2019-07-10 NOTE — PROGRESS NOTES
[facility]  Orthopedics  Progress Note    Patient Name: Jenni Todd  MRN: [id number]  Admission Date: 7/6/2019  Hospital Length of Stay: 0 days  Attending Provider: Fiordaliza Llamas MD  Primary Care Provider: Michael Tan Iii, MD  Follow-up For: Procedure(s) (LRB):  APPLICATION, EXTERNAL FIXATION DEVICE, LARGE, TIBIA- SYNTHES (Right)    Post-Operative Day: 4 Days Post-Op  Subjective:     Principal Problem:Closed fracture of right proximal tibia    Principal Orthopedic Problem: same    Interval History: Patient seen and examined at bedside.  No acute events overnight.  Pain controlled.  EOB with PT yesterday.  RLE elevated.    Review of patient's allergies indicates:   Allergen Reactions    Flagyl [metronidazole hcl] Nausea And Vomiting    Clindamycin Diarrhea    Metronidazole        Current Facility-Administered Medications   Medication    0.9%  NaCl infusion (for blood administration)    0.9%  NaCl infusion    acetaminophen tablet 1,000 mg    acetaminophen tablet 650 mg    atorvastatin tablet 40 mg    dextrose 10% (D10W) Bolus    dextrose 10% (D10W) Bolus    epoetin adonay-epbx injection 5,800 Units    famotidine tablet 20 mg    ferrous sulfate EC tablet 325 mg    gabapentin capsule 300 mg    glucagon (human recombinant) injection 1 mg    glucose chewable tablet 16 g    glucose chewable tablet 24 g    heparin (porcine) injection 1,000 Units    heparin (porcine) injection 5,000 Units    HYDROmorphone injection 0.2 mg    insulin aspart U-100 pen 0-5 Units    oxyCODONE immediate release tablet 10 mg    oxyCODONE immediate release tablet 15 mg    oxyCODONE immediate release tablet 5 mg    senna-docusate 8.6-50 mg per tablet 1 tablet    sevelamer carbonate tablet 800 mg    sodium chloride 0.9% flush 10 mL    sodium chloride 0.9% flush 10 mL    vitamin renal formula (B-complex-vitamin c-folic acid) 1 mg per capsule 1 capsule     Objective:     Vital Signs (Most  "Recent):  Temp: 98 °F (36.7 °C) (07/10/19 0427)  Pulse: 84 (07/10/19 0520)  Resp: 18 (07/10/19 0427)  BP: (!) 119/58 (07/10/19 0427)  SpO2: 99 % (07/10/19 0427) Vital Signs (24h Range):  Temp:  [98 °F (36.7 °C)-99 °F (37.2 °C)] 98 °F (36.7 °C)  Pulse:  [81-95] 84  Resp:  [18-20] 18  SpO2:  [95 %-99 %] 99 %  BP: (114-143)/(58-67) 119/58     Weight: 90.3 kg (199 lb 1.2 oz)  Height: 5' 7" (170.2 cm)  Body mass index is 31.18 kg/m².      Intake/Output Summary (Last 24 hours) at 7/10/2019 0549  Last data filed at 7/9/2019 1800  Gross per 24 hour   Intake 800 ml   Output 0 ml   Net 800 ml       Ortho/SPM Exam     AAOx4  NAD  Reg rate  No increased WOB     MSK:    Stage one right ischial ulcer without drainage or SOI    RLE:  Knee spanning ex fix in place, pin sites without drainage or SOI  SILT throughout distally  Moderately swollen lower leg - improved since yesterday  Proximal tibia with skin wrinkling  Leg elevated    Significant Labs:   CBC:   Recent Labs   Lab 07/08/19 2147 07/09/19  0352   WBC 13.64* 10.39   HGB 9.7* 8.9*   HCT 31.2* 30.1*    193     CMP:   Recent Labs   Lab 07/09/19  0352      K 3.4*   CL 99   CO2 21*   *   BUN 17   CREATININE 5.4*   CALCIUM 10.6*   PROT 7.5   ALBUMIN 2.5*   BILITOT 0.4   ALKPHOS 85   AST 24   ALT <5*   ANIONGAP 16   EGFRNONAA 8.6*       Significant Imaging: I have reviewed all pertinent imaging results/findings.    Assessment/Plan:     * Closed fracture of right proximal tibia  Jenni Todd is a 48 y.o. female with closed Schatzker 6 tibial plateau fracture  - External Fixator placed 7/6/19  - Twice daily pin site care:  Clean pins with 50% peroxide / 50% sterile water solution.  Wrap pins with Kerlix gauze.  - nephrology following for HD treatments  - DVT ppx - holding currently for possible surgery today  - PT/OT daily, NWB RLE  - aggressive ice and elevation RLE  - wound care consulted for stage 1 right ischial decubitus ulcer  - labs: " pending    Dispo: Patient consented for definitive surgery. Swelling improved.  Continue ice and elevation.  Keep NPO.  Will discuss with staff surgical timing - possibly this afternoon after early morning dialysis.          Shyam Cohen MD  Orthopedics  [facility]    Attg Note:  Patient seen and examined.  I agree with the resident's assessment and plan.  To OR tomorrow for ORIF.  The risks, benefits and alternatives to surgery were discussed with the patient at great length.  These include bleeding, infection, vessel/nerve damage, pain, numbness, tingling, complex regional pain syndrome, hardware/surgical failure, need for further surgery, malunion, nonunion, DVT, PE, arthritis and death.  We discussed the increased risk of wound healing problems and infection in the setting of renal failure on dialysis.  Patient states an understanding and wishes to proceed with surgery.   All questions were answered.  No guarantees were implied or stated.  Informed consent was obtained.      Dominick Desai MD

## 2019-07-10 NOTE — SUBJECTIVE & OBJECTIVE
"Principal Problem:Closed fracture of right proximal tibia    Principal Orthopedic Problem: same    Interval History: Patient seen and examined at bedside.  No acute events overnight.  Pain controlled.  EOB with PT yesterday.  RLE elevated.    Review of patient's allergies indicates:   Allergen Reactions    Flagyl [metronidazole hcl] Nausea And Vomiting    Clindamycin Diarrhea    Metronidazole        Current Facility-Administered Medications   Medication    0.9%  NaCl infusion (for blood administration)    0.9%  NaCl infusion    acetaminophen tablet 1,000 mg    acetaminophen tablet 650 mg    atorvastatin tablet 40 mg    dextrose 10% (D10W) Bolus    dextrose 10% (D10W) Bolus    epoetin adonay-epbx injection 5,800 Units    famotidine tablet 20 mg    ferrous sulfate EC tablet 325 mg    gabapentin capsule 300 mg    glucagon (human recombinant) injection 1 mg    glucose chewable tablet 16 g    glucose chewable tablet 24 g    heparin (porcine) injection 1,000 Units    heparin (porcine) injection 5,000 Units    HYDROmorphone injection 0.2 mg    insulin aspart U-100 pen 0-5 Units    oxyCODONE immediate release tablet 10 mg    oxyCODONE immediate release tablet 15 mg    oxyCODONE immediate release tablet 5 mg    senna-docusate 8.6-50 mg per tablet 1 tablet    sevelamer carbonate tablet 800 mg    sodium chloride 0.9% flush 10 mL    sodium chloride 0.9% flush 10 mL    vitamin renal formula (B-complex-vitamin c-folic acid) 1 mg per capsule 1 capsule     Objective:     Vital Signs (Most Recent):  Temp: 98 °F (36.7 °C) (07/10/19 0427)  Pulse: 84 (07/10/19 0520)  Resp: 18 (07/10/19 0427)  BP: (!) 119/58 (07/10/19 0427)  SpO2: 99 % (07/10/19 0427) Vital Signs (24h Range):  Temp:  [98 °F (36.7 °C)-99 °F (37.2 °C)] 98 °F (36.7 °C)  Pulse:  [81-95] 84  Resp:  [18-20] 18  SpO2:  [95 %-99 %] 99 %  BP: (114-143)/(58-67) 119/58     Weight: 90.3 kg (199 lb 1.2 oz)  Height: 5' 7" (170.2 cm)  Body mass index is " 31.18 kg/m².      Intake/Output Summary (Last 24 hours) at 7/10/2019 0549  Last data filed at 7/9/2019 1800  Gross per 24 hour   Intake 800 ml   Output 0 ml   Net 800 ml       Ortho/SPM Exam     AAOx4  NAD  Reg rate  No increased WOB     MSK:    Stage one right ischial ulcer without drainage or SOI    RLE:  Knee spanning ex fix in place, pin sites without drainage or SOI  SILT throughout distally  Moderately swollen lower leg - improved since yesterday  Proximal tibia with skin wrinkling  Leg elevated    Significant Labs:   CBC:   Recent Labs   Lab 07/08/19 2147 07/09/19  0352   WBC 13.64* 10.39   HGB 9.7* 8.9*   HCT 31.2* 30.1*    193     CMP:   Recent Labs   Lab 07/09/19  0352      K 3.4*   CL 99   CO2 21*   *   BUN 17   CREATININE 5.4*   CALCIUM 10.6*   PROT 7.5   ALBUMIN 2.5*   BILITOT 0.4   ALKPHOS 85   AST 24   ALT <5*   ANIONGAP 16   EGFRNONAA 8.6*       Significant Imaging: I have reviewed all pertinent imaging results/findings.

## 2019-07-10 NOTE — PLAN OF CARE
Problem: Physical Therapy Goal  Goal: Physical Therapy Goal  Goals to be met by: 19    Patient will increase functional independence with mobility by performin. Supine to sit with Moderate Assistance -not met  2. Sit to supine with Moderate Assistance -not met  3. Sit to stand transfer with Moderate Assistance with RW -not met  4. Bed to chair transfer with Minimal Assistance using Slideboard -not met     Outcome: Ongoing (interventions implemented as appropriate)  Patient progressing appropriately towards current goals and plan of care remains appropriate at this time.     Jewel Zabala, PT, DPT  7/10/2019  Pager #: [phone]

## 2019-07-11 NOTE — SUBJECTIVE & OBJECTIVE
Principal Problem:Closed fracture of right proximal tibia    Principal Orthopedic Problem: same    Interval History: Patient seen and examined at bedside.  No acute events overnight.  Pain controlled.  Bed mobility with PT yesterday.  RLE elevated.  Dialysis yesterday.    Review of patient's allergies indicates:   Allergen Reactions    Flagyl [metronidazole hcl] Nausea And Vomiting    Clindamycin Diarrhea    Metronidazole        Current Facility-Administered Medications   Medication    0.9%  NaCl infusion (for blood administration)    0.9%  NaCl infusion (for blood administration)    0.9%  NaCl infusion    acetaminophen tablet 1,000 mg    acetaminophen tablet 650 mg    atorvastatin tablet 40 mg    dextrose 10% (D10W) Bolus    dextrose 10% (D10W) Bolus    epoetin aodnay-epbx injection 5,800 Units    famotidine tablet 20 mg    ferrous sulfate EC tablet 325 mg    gabapentin capsule 300 mg    glucagon (human recombinant) injection 1 mg    glucose chewable tablet 16 g    glucose chewable tablet 24 g    heparin (porcine) injection 1,000 Units    heparin (porcine) injection 5,000 Units    HYDROmorphone injection 0.2 mg    insulin aspart U-100 pen 0-5 Units    oxyCODONE immediate release tablet 10 mg    oxyCODONE immediate release tablet 15 mg    oxyCODONE immediate release tablet 5 mg    senna tablet 8.6 mg    sevelamer carbonate tablet 800 mg    sodium chloride 0.9% flush 10 mL    sodium chloride 0.9% flush 10 mL    vitamin renal formula (B-complex-vitamin c-folic acid) 1 mg per capsule 1 capsule     Objective:     Vital Signs (Most Recent):  Temp: 96.7 °F (35.9 °C) (07/11/19 0508)  Pulse: 91 (07/11/19 0508)  Resp: 18 (07/11/19 0508)  BP: (!) 106/58 (07/11/19 0508)  SpO2: 100 % (07/11/19 0508) Vital Signs (24h Range):  Temp:  [96.7 °F (35.9 °C)-99.2 °F (37.3 °C)] 96.7 °F (35.9 °C)  Pulse:  [80-98] 91  Resp:  [14-20] 18  SpO2:  [100 %] 100 %  BP: ()/(48-80) 106/58     Weight: 90.3 kg (199  "lb 1.2 oz)  Height: 5' 7" (170.2 cm)  Body mass index is 31.18 kg/m².      Intake/Output Summary (Last 24 hours) at 7/11/2019 0539  Last data filed at 7/10/2019 1100  Gross per 24 hour   Intake 250 ml   Output 2000 ml   Net -1750 ml       Ortho/SPM Exam     AAOx4  NAD  Reg rate  No increased WOB     MSK:    Stage one right ischial ulcer without drainage or SOI    RLE:  Knee spanning ex fix in place, pin sites without drainage or SOI  SILT throughout distally  Moderately swollen lower leg - improving  Proximal tibia with skin wrinkling  Leg elevated    Significant Labs:   CBC:   Recent Labs   Lab 07/10/19  0644   WBC 11.68   HGB 9.5*   HCT 31.4*        CMP:   Recent Labs   Lab 07/10/19  0506   *   K 3.9   CL 99   CO2 20*      BUN 25*   CREATININE 6.9*   CALCIUM 11.1*   PROT 7.6   ALBUMIN 2.6*   BILITOT 0.4   ALKPHOS 99   AST 21   ALT <5*   ANIONGAP 16   EGFRNONAA 6.4*       Significant Imaging: I have reviewed all pertinent imaging results/findings.  "

## 2019-07-11 NOTE — PT/OT/SLP PROGRESS
Occupational Therapy      Patient Name:  Jenni Todd   MRN:  [id number]    Patient is off floor for external fixator removal and surgical fixation of R tibial plateau fx. Please update weight bearing status following procedure. Will follow-up 7/12/19.    Maria Alejandra Raymond OT  7/11/2019

## 2019-07-11 NOTE — ANESTHESIA PROCEDURE NOTES
Arterial    Diagnosis: right leg pain    Patient location during procedure: pre-op  Procedure start time: 7/11/2019 1:00 PM  Timeout: 7/11/2019 1:00 PM  Procedure end time: 7/11/2019 1:20 PM    Staffing  Authorizing Provider: Kev Tesfaye Jr., MD  Performing Provider: Kev Tesfaye Jr., MD    Anesthesiologist was present at the time of the procedure.    Preanesthetic Checklist  Completed: patient identified, site marked, surgical consent, pre-op evaluation, timeout performed, IV checked, risks and benefits discussed, monitors and equipment checked and anesthesia consent givenArterial  Skin Prep: chlorhexidine gluconate  Local Infiltration: lidocaine  Orientation: left  Location: radial  Catheter Size: 20 G  Catheter placement by Ultrasound guidance. Heme positive aspiration all ports.  Vessel Caliber: medium, patent  Vascular Doppler:  not done  Needle advanced into vessel with real time Ultrasound guidance.Insertion Attempts: 2  Assessment  Dressing: secured with tape and tegaderm  Patient: Tolerated well

## 2019-07-11 NOTE — ASSESSMENT & PLAN NOTE
Jenni Todd is a 48 y.o. female with closed Schatzker 6 tibial plateau fracture  - External Fixator placed 7/6/19  - Twice daily pin site care:  Clean pins with 50% peroxide / 50% sterile water solution.  Wrap pins with Kerlix gauze.  - nephrology following for HD treatments - HD yesterday  - DVT ppx - holding currently for surgery today  - PT/OT daily, NWB RLE  - aggressive ice and elevation RLE  - wound care consulted for stage 1 right ischial decubitus ulcer  - labs: pending    Dispo: Patient consented for definitive surgery. Swelling improved.  Keep NPO.  Surgery today.  R/B/A discussed.  She understands and wishes to proceed.

## 2019-07-11 NOTE — PROGRESS NOTES
[facility]  Orthopedics  Progress Note    Patient Name: Jenni Todd  MRN: [id number]  Admission Date: 7/6/2019  Hospital Length of Stay: 0 days  Attending Provider: Fiordaliza Llamas MD  Primary Care Provider: Michael Tan Iii, MD  Follow-up For: Procedure(s) (LRB):  APPLICATION, EXTERNAL FIXATION DEVICE, LARGE, TIBIA- SYNTHES (Right)    Post-Operative Day: 5 Days Post-Op  Subjective:     Principal Problem:Closed fracture of right proximal tibia    Principal Orthopedic Problem: same    Interval History: Patient seen and examined at bedside.  No acute events overnight.  Pain controlled.  Bed mobility with PT yesterday.  RLE elevated.  Dialysis yesterday.    Review of patient's allergies indicates:   Allergen Reactions    Flagyl [metronidazole hcl] Nausea And Vomiting    Clindamycin Diarrhea    Metronidazole        Current Facility-Administered Medications   Medication    0.9%  NaCl infusion (for blood administration)    0.9%  NaCl infusion (for blood administration)    0.9%  NaCl infusion    acetaminophen tablet 1,000 mg    acetaminophen tablet 650 mg    atorvastatin tablet 40 mg    dextrose 10% (D10W) Bolus    dextrose 10% (D10W) Bolus    epoetin adonay-epbx injection 5,800 Units    famotidine tablet 20 mg    ferrous sulfate EC tablet 325 mg    gabapentin capsule 300 mg    glucagon (human recombinant) injection 1 mg    glucose chewable tablet 16 g    glucose chewable tablet 24 g    heparin (porcine) injection 1,000 Units    heparin (porcine) injection 5,000 Units    HYDROmorphone injection 0.2 mg    insulin aspart U-100 pen 0-5 Units    oxyCODONE immediate release tablet 10 mg    oxyCODONE immediate release tablet 15 mg    oxyCODONE immediate release tablet 5 mg    senna tablet 8.6 mg    sevelamer carbonate tablet 800 mg    sodium chloride 0.9% flush 10 mL    sodium chloride 0.9% flush 10 mL    vitamin renal formula (B-complex-vitamin c-folic acid) 1 mg per  "capsule 1 capsule     Objective:     Vital Signs (Most Recent):  Temp: 96.7 °F (35.9 °C) (07/11/19 0508)  Pulse: 91 (07/11/19 0508)  Resp: 18 (07/11/19 0508)  BP: (!) 106/58 (07/11/19 0508)  SpO2: 100 % (07/11/19 0508) Vital Signs (24h Range):  Temp:  [96.7 °F (35.9 °C)-99.2 °F (37.3 °C)] 96.7 °F (35.9 °C)  Pulse:  [80-98] 91  Resp:  [14-20] 18  SpO2:  [100 %] 100 %  BP: ()/(48-80) 106/58     Weight: 90.3 kg (199 lb 1.2 oz)  Height: 5' 7" (170.2 cm)  Body mass index is 31.18 kg/m².      Intake/Output Summary (Last 24 hours) at 7/11/2019 0539  Last data filed at 7/10/2019 1100  Gross per 24 hour   Intake 250 ml   Output 2000 ml   Net -1750 ml       Ortho/SPM Exam     AAOx4  NAD  Reg rate  No increased WOB     MSK:    Stage one right ischial ulcer without drainage or SOI    RLE:  Knee spanning ex fix in place, pin sites without drainage or SOI  SILT throughout distally  Moderately swollen lower leg - improving  Proximal tibia with skin wrinkling  Leg elevated    Significant Labs:   CBC:   Recent Labs   Lab 07/10/19  0644   WBC 11.68   HGB 9.5*   HCT 31.4*        CMP:   Recent Labs   Lab 07/10/19  0506   *   K 3.9   CL 99   CO2 20*      BUN 25*   CREATININE 6.9*   CALCIUM 11.1*   PROT 7.6   ALBUMIN 2.6*   BILITOT 0.4   ALKPHOS 99   AST 21   ALT <5*   ANIONGAP 16   EGFRNONAA 6.4*       Significant Imaging: I have reviewed all pertinent imaging results/findings.    Assessment/Plan:     * Closed fracture of right proximal tibia  Jenni Todd is a 48 y.o. female with closed Schatzker 6 tibial plateau fracture  - External Fixator placed 7/6/19  - Twice daily pin site care:  Clean pins with 50% peroxide / 50% sterile water solution.  Wrap pins with Kerlix gauze.  - nephrology following for HD treatments - HD yesterday  - DVT ppx - holding currently for surgery today  - PT/OT daily, NWB RLE  - aggressive ice and elevation RLE  - wound care consulted for stage 1 right ischial decubitus ulcer  - " labs: pending    Dispo: Patient consented for definitive surgery. Swelling improved.  Keep NPO.  Surgery today.  R/B/A discussed.  She understands and wishes to proceed.          Shyam Cohen MD  Orthopedics  [facility]    Attg Note:  I agree with the resident's assessment and plan.      Dominick Desai MD

## 2019-07-11 NOTE — OP NOTE
OP NOTE    DOS:  07/11/2019    Preop Dx: Right bicondylar tibial plateau fracture status post spanning external fixation    Postop Dx: Right bicondylar tibial plateau fracture status post spanning external fixation    Procedure: 1.  Open treatment of right bicondylar tibial plateau fracture with reduction and internal fixation 08633    2.  Removal, under anesthesia of external fixation right leg 20694    Surgeon: Dominick Desai M.D.    Asst:  Shyam Max M.D    Anesthesia: GLMA    EBL:  75 cc    IVF:  300 cc crystalloid    Implants:   Implant Name Type Inv. Item Serial No.  Lot No. LRB No. Used   K-WIRE THRD TIP 2.0MM D 280MM - [id number]  K-WIRE THRD TIP 2.0MM D 280MM  SYNTHES  Right 2   SCREW JASON CON 5.0X70MM - [id number]  SCREW JASON CON 5.0X70MM  SYNTHES  Right 1   SCREW JASON CYNTHIA CONIC 5X80 SS - [id number]  SCREW JASON CYNTHIA CONIC 5X80 SS  [employer]  Right 1   LCP PROX LATERAL TIBIA PLATE  7 HOLE     [id number] Right 1   SCREW LOCKING 5.0X 30MM - [id number]  SCREW LOCKING 5.0X 30MM  SY[employer]THES  Right 1   SCREW L-S-D S/T 5.0X 32 - [id number]  SCREW L-S-D S/T 5.0X 32  SYNTHES  Right 2   SCREW L-S-D S/T 5.0X 34. - [id number]  SCREW L-S-D S/T 5.0X 34.  SYNTHES  Right 1   SCREW L-S-D S/T 5.0X 36. - [id number]  SCREW L-S-D S/T 5.0X 36.  SYNTHES  Right 2   SCREW BONE LOCK ST T25 5X65MM - [id number]  SCREW BONE LOCK ST T25 5X65MM  [employer]  Right 1   SCREW BONE LK 5.0X70MM - [id number]  SCREW BONE LK 5.0X70MM  SY[employer]ES  Right 1   SCREW BONE LOCK ST T25 5X75MM - [id number]  SCREW BONE LOCK ST T25 5X75MM  [employer]  Right 2   SCREW L-S-D S/T 5.0X 80 - [id number]  SCREW L-S-D S/T 5.0X 80  SY[employer]ES  Right 1   SCREW L-S-D S/T 5.0X 60 - [id number]  SCREW L-S-D S/T 5.0X 60  SY[employer]THES  Right 1   SCREW L-S-D S/T 5.0X 42 - [id number]  SCREW L-S-D S/T 5.0X 42  SYNTHES  Right 1         Specimens: None    Findings: Stable fixation    Dispo:  To PACU extubated/stable       Indications for Procedure:      48-year-old female with  end-stage renal disease on dialysis twisted and fell resulting in a right bicondylar tibial plateau fracture.  She was treated initially with spanning external fixation 5 days ago.  She is now returning to the operating room for definitive fixation.  The risks, benefits and alternatives to surgery discussed at length with the patient prior to going to the operating room. Informed consent was obtained.    Procedure in Detail:    Patient was identified the preoperative holding area the site was marked.  Regional analgesia was administered. Patient was wheeled into the operating room and placed on the operating table in a supine position.  General laryngeal mask anesthesia was induced and preoperative antibiotics were administered. The right lower extremity was prepped and draped in sterile fashion. A time-out was undertaken to confirm patient, side, site, surgery, surgeon and the administration of preoperative antibiotics.  All agreed we proceeded.    I made a lateral incision from just proximal to the joint line overlying the central portion of Gerdy's tubercle.  I incised the iliotibial band and tibialis anterior fascia in line with skin incision. I dissected the tissue off of the proximal portion of the lateral tibia and then elevated the origin of the tibialis anterior muscle off the proximal tibia. The patient had good alignment in the external fixator and minimal fracture line at the joint surface which is in very good position after external fixation. I slid a 7 hole TODD plate submuscular along the tibia and this gave good length past the fracture site. The plate fit quite proximally against the bone not leaving significant room for independent compression screws.  I instead placed a bicortical conical screw proximally to gain compression across the joint surface.  The patient had very weak bone. I placed a lock screw after this proximally.    The patient did have some distraction at the fracture site at this  point time I placed 2 compressor/distractor clamps at the end of the external fixator and loosen the distal clamps and then used the compression function to compress the fracture in the shaft getting extremely good compression.  With it locked in this position I then placed a whirly bird in the plate on the shaft the bone to pull this down to the shaft.  I then placed 2 lock screws and then removed the whirlybird and placed a lock screw this had been.  I had a 2 hole spread between each screw.    I then turned my attention back proximally and placed all the angled locking screws and then removed the insertion handle placed a final locking screw.  One of the kickstand screws got good bicortical purchase to help prevent varus.  I visualized the entire construct under fluoroscopy and very good reduction the fracture with good compression and hardware placement.    The wound was copiously irrigated with normal saline solution and the iliotibial band and tibialis anterior fascia closed 0 Vicryl suture.  A g of vancomycin powder was placed deep. The next layer fascia was closed 0 Vicryl suture, the subcutaneous tissue with 3 0 Vicryl suture, and the skin with 3 O nylon suture running fashion.  The 3 stab incisions for distal screw placement were closed with 3 0 Vicryl suture in subcutaneous tissue and through nylon suture in the skin. A sterile dressings applied.    The remainder of the external fixator was deconstructed the pin is removed from the tibia and from the femur.  There holes were cleansed and these were also covered.  I then took the knee through a full range of motion gaining 135° of flexion full extension.    All instrument and sponge counts were reported correct in the case. There were no complications.  The patient was extubated, awakened and taken to the recovery room in stable condition.    Plan for the patient:    She will be range of motion as tolerated and be nonweightbearing for 10 weeks pending  fracture healing.    Dominick Desai MD

## 2019-07-11 NOTE — PT/OT/SLP PROGRESS
Physical Therapy      Patient Name:  Jenni Todd   MRN:  [id number]    Patient is off the floor for removal of external fixator and surgical fixation of R tibial plateau fracture. Please update weight bearing status following procedure.  Will continue to check in with patient tomorrow.    Jewel Zabala, PT

## 2019-07-11 NOTE — ANESTHESIA PROCEDURE NOTES
Peripheral Block    Patient location during procedure: post-op   Block not for primary anesthetic.  Reason for block: at surgeon's request and post-op pain management   Post-op Pain Location: Right leg pain  Start time: 7/11/2019 5:10 PM  Timeout: 7/11/2019 5:10 PM   End time: 7/11/2019 5:30 PM    Staffing  Authorizing Provider: Kev Tesfaye Jr., MD  Performing Provider: Andrew Spencer MD    Preanesthetic Checklist  Completed: patient identified, site marked, surgical consent, pre-op evaluation, timeout performed, IV checked, risks and benefits discussed and monitors and equipment checked  Peripheral Block  Patient position: supine  Prep: ChloraPrep  Patient monitoring: heart rate, cardiac monitor, continuous pulse ox, continuous capnometry and frequent blood pressure checks  Block type: popliteal  Laterality: right  Injection technique: continuous  Needle  Needle type: Tuohy   Needle length: 3.5 in  Needle localization: ultrasound guidance  Catheter type: spring wound  Test dose: lidocaine 1.5% with Epi 1-to-200,000 and negative   -ultrasound image captured on disc.  Assessment  Injection assessment: negative aspiration, negative parasthesia and local visualized surrounding nerve  Paresthesia pain: none  Heart rate change: no  Slow fractionated injection: yes  Additional Notes  20 ml 0.25% bupivacaine

## 2019-07-11 NOTE — ASSESSMENT & PLAN NOTE
-07/06, XR showing tibial fracture  -Ortho consulted and following  -PT/OT consulted and following  -Ortho did ex-fix on 07/06 with plans for definitive surgery at a later date  -07/11, currently undergoing definitive surgery with Ortho 07/11  -Pain control via tylenol and oxycodone with dilaudid available for breakthrough

## 2019-07-11 NOTE — ANESTHESIA PROCEDURE NOTES
Peripheral Block    Patient location during procedure: post-op   Block not for primary anesthetic.  Reason for block: at surgeon's request and post-op pain management   Post-op Pain Location: Right leg pain  Start time: 7/11/2019 5:30 PM  Timeout: 7/11/2019 5:30 PM   End time: 7/11/2019 5:40 PM    Staffing  Authorizing Provider: Kev Tesfaye Jr., MD  Performing Provider: Andrew Spencer MD    Preanesthetic Checklist  Completed: patient identified, site marked, surgical consent, pre-op evaluation, timeout performed, IV checked, risks and benefits discussed and monitors and equipment checked  Peripheral Block  Patient position: supine  Prep: ChloraPrep  Patient monitoring: heart rate, cardiac monitor, continuous pulse ox, continuous capnometry and frequent blood pressure checks  Block type: femoral  Laterality: right  Injection technique: single shot  Needle  Needle type: Stimuplex   Needle length: 4 in  Needle localization: ultrasound guidance   -ultrasound image captured on disc.  Assessment  Injection assessment: negative aspiration, negative parasthesia and local visualized surrounding nerve  Paresthesia pain: none  Heart rate change: no  Slow fractionated injection: yes  Additional Notes  20ml 0.25% bupivacaine

## 2019-07-11 NOTE — TRANSFER OF CARE
"Anesthesia Transfer of Care Note    Patient: Jenni Todd    Procedure(s) Performed: Procedure(s) (LRB):  ORIF, FRACTURE, TIBIA, PLATEAU (Right)  REMOVAL, EXTERNAL FIXATION DEVICE (Right)    Patient location: PACU    Anesthesia Type: general    Transport from OR: Transported from OR on 6-10 L/min O2 by face mask with adequate spontaneous ventilation    Post pain: adequate analgesia    Post assessment: no apparent anesthetic complications    Post vital signs: stable    Level of consciousness: awake, alert and oriented    Nausea/Vomiting: no nausea/vomiting    Complications: none    Transfer of care protocol was followed      Last vitals:   Visit Vitals  /67   Pulse 86   Temp 35.8 °C (96.5 °F)   Resp 16   Ht 5' 7" (1.702 m)   Wt 90.3 kg (199 lb 1.2 oz)   LMP 01/28/2014 (Approximate)   SpO2 100%   Breastfeeding? No   BMI 31.18 kg/m²     "

## 2019-07-11 NOTE — ASSESSMENT & PLAN NOTE
-reports a clot in vascular access line noticed at dialysis on 07/05  -Neprhology consulted and following  -strict Is/Os, daily weights, renal diet  -Neprhology flushed line with alteplase on 07/07  -HD on 07/08. Went well with 1L fluid removal. Needed Midodrine once for low BP.  -HD on 07/10. Went well with 1.5L fluid removal. Received Midodrine once for low BP.  -renal vitamins

## 2019-07-11 NOTE — SUBJECTIVE & OBJECTIVE
Review of Systems   Constitutional: Negative for chills and fever.   HENT: Negative for sore throat.    Respiratory: Negative for shortness of breath.    Cardiovascular: Positive for leg swelling. Negative for chest pain.   Gastrointestinal: Positive for abdominal distention. Negative for abdominal pain.   Musculoskeletal: Positive for joint swelling.        Diffuse R leg pain, better than yesterday   Skin: Negative for rash and wound.   Neurological: Negative for headaches.     Objective:     Vital Signs (Most Recent):  Temp: 96.5 °F (35.8 °C) (07/11/19 0809)  Pulse: 86 (07/11/19 1315)  Resp: 16 (07/11/19 1300)  BP: 105/67 (07/11/19 1300)  SpO2: 100 % (07/11/19 1300) Vital Signs (24h Range):  Temp:  [96.5 °F (35.8 °C)-98.8 °F (37.1 °C)] 96.5 °F (35.8 °C)  Pulse:  [80-95] 86  Resp:  [11-20] 16  SpO2:  [100 %] 100 %  BP: ()/(42-80) 105/67  Arterial Line BP: (101)/(47) 101/47     Weight: 90.3 kg (199 lb 1.2 oz)  Body mass index is 31.18 kg/m².    Physical Exam   Constitutional: She is oriented to person, place, and time. She appears well-developed and well-nourished. No distress.   HENT:   Head: Normocephalic and atraumatic.   Eyes: Conjunctivae and EOM are normal.   Neck: Normal range of motion. Neck supple.   Cardiovascular: Normal rate, regular rhythm and normal heart sounds.   No murmur heard.  Pulmonary/Chest: Effort normal and breath sounds normal. No respiratory distress.   Abdominal: Bowel sounds are normal. She exhibits distension. There is no tenderness. There is no rebound.   Musculoskeletal: She exhibits no edema.   Ex-fix and bandages in place on R leg with leg elevated in the bed on physical exam   Distal pulses intact bilaterally   Neurological: She is alert and oriented to person, place, and time. No sensory deficit.   Skin: Skin is warm and dry.   Psychiatric: She has a normal mood and affect.         CRANIAL NERVES     CN III, IV, VI   Extraocular motions are normal.        Significant Labs:  All pertinent labs within the past 24 hours have been reviewed.    Significant Imaging: I have reviewed all pertinent imaging results/findings within the past 24 hours.

## 2019-07-11 NOTE — PROGRESS NOTES
[facility] Hospital Medicine  Progress Note    Patient Name: Jenni Todd  MRN: [id number]  Patient Class: OP- Observation   Admission Date: 7/6/2019  Length of Stay: 0 days  Attending Physician: Fiordaliza Llamas MD  Primary Care Provider: Michael Tan Iii, MD    [facility] Medicine Team: [facility] HOSP MED 2 Kev Villegas MD    Subjective:     Principal Problem:Closed fracture of right proximal tibia      HPI:  Patient is a 47 yo F with PMH of ESRD (on HD MWF), DM2, neuropathy, HTN, HLD, CAD, obesity, bulging discs in back who presented to the ED via ambulance following a fall. Patient was walking down the steps in front of her house and fell down and twisted her R leg. She described her leg pain as a 10/10. Last night, while at dialysis, her nurse could not aspirate her R femoral line, and she was instructed to come to the ER due to a clot in her line. She was on her way out of her house to come to the ER when she fell. At baseline, she uses a walker to get around due to her neuropathy in her feet and has a history of falls. In 2018, she fractured her hip and was operated on by Dr. Grullon.    She has had an extensive history for recurrent vascular access problems. She was on peritoneal dialysis for 14 years prior to February 2019, at which point she developed peritonitis, was admitted, and had her catheter removed and switched to HD. 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 Her last dialysis was on Wednesday, 07/03. She plans for a peritoneal dialysis catheter placement on July 25th with Dr. Tucker at [facility].    In the ED, XRs of her pelvis, knee, femur, tibia, and ankle of her R leg were ordered. R tibia XR revealed a tibial fracture and signs of osteopenia. Ortho and Nephrology were consulted. Her ASA and plavix were held, she was splinted, and Ortho planned for an ex-fix today, which was cleared  by Nephrology.           Overview/Hospital Course:  Patient was admitted to the floor on 07/06 with a R tibial fracture and a clotted R femoral access line. Orthopedic Surgery and Nephrology were consulted from the ED. Ortho operated on the patient on 07/06 and did an ex-fix with plans for definitive correction surgery at a later date. On 07/07, Nephrology flushed alteplase into her access line, and she is scheduled for dialysis treatment. Her pain is well-controlled with Tylenol and Oxycodone with Dilaudid available for breakthrough pain. Her home medications were resumed upon admission, except her midodrine was held prior to surgery due to concerns for falling BPs.  On 07/08, patient underwent HD and 1L of fluid was removed. On 07/09, Ortho evaluated the swelling of her R knee, and decided to not operate. On 07/10, patient went to HD at which 1.5L of fluid was removed. Her BP dropped to 81/56 following dialysis, and a dose of midodrine 10 mg was ordered. 07/11, patient underwent definitive surgery with Ortho.     Review of Systems   Constitutional: Negative for chills and fever.   HENT: Negative for sore throat.    Respiratory: Negative for shortness of breath.    Cardiovascular: Positive for leg swelling. Negative for chest pain.   Gastrointestinal: Positive for abdominal distention. Negative for abdominal pain.   Musculoskeletal: Positive for joint swelling.        Diffuse R leg pain, better than yesterday   Skin: Negative for rash and wound.   Neurological: Negative for headaches.     Objective:     Vital Signs (Most Recent):  Temp: 96.5 °F (35.8 °C) (07/11/19 0809)  Pulse: 86 (07/11/19 1315)  Resp: 16 (07/11/19 1300)  BP: 105/67 (07/11/19 1300)  SpO2: 100 % (07/11/19 1300) Vital Signs (24h Range):  Temp:  [96.5 °F (35.8 °C)-98.8 °F (37.1 °C)] 96.5 °F (35.8 °C)  Pulse:  [80-95] 86  Resp:  [11-20] 16  SpO2:  [100 %] 100 %  BP: ()/(42-80) 105/67  Arterial Line BP: (101)/(47) 101/47     Weight: 90.3 kg (199  lb 1.2 oz)  Body mass index is 31.18 kg/m².    Physical Exam   Constitutional: She is oriented to person, place, and time. She appears well-developed and well-nourished. No distress.   HENT:   Head: Normocephalic and atraumatic.   Eyes: Conjunctivae and EOM are normal.   Neck: Normal range of motion. Neck supple.   Cardiovascular: Normal rate, regular rhythm and normal heart sounds.   No murmur heard.  Pulmonary/Chest: Effort normal and breath sounds normal. No respiratory distress.   Abdominal: Bowel sounds are normal. She exhibits distension. There is no tenderness. There is no rebound.   Musculoskeletal: She exhibits no edema.   Ex-fix and bandages in place on R leg with leg elevated in the bed on physical exam   Distal pulses intact bilaterally   Neurological: She is alert and oriented to person, place, and time. No sensory deficit.   Skin: Skin is warm and dry.   Psychiatric: She has a normal mood and affect.         CRANIAL NERVES     CN III, IV, VI   Extraocular motions are normal.        Significant Labs: All pertinent labs within the past 24 hours have been reviewed.    Significant Imaging: I have reviewed all pertinent imaging results/findings within the past 24 hours.      Assessment/Plan:      * Closed fracture of right proximal tibia  -07/06, XR showing tibial fracture  -Ortho consulted and following  -PT/OT consulted and following  -Ortho did ex-fix on 07/06 with plans for definitive surgery at a later date  -07/11, currently undergoing definitive surgery with Ortho 07/11  -Pain control via tylenol and oxycodone with dilaudid available for breakthrough      ESRD (end stage renal disease) on dialysis  -reports a clot in vascular access line noticed at dialysis on 07/05  -Neprhology consulted and following  -strict Is/Os, daily weights, renal diet  -Neprhology flushed line with alteplase on 07/07  -HD on 07/08. Went well with 1L fluid removal. Needed Midodrine once for low BP.  -HD on 07/10. Went well  with 1.5L fluid removal. Received Midodrine once for low BP.  -renal vitamins    Bulging discs - symptomatic   -gabapentin 300 BID      CAD (coronary artery disease)  -holding home ASA and plavix      Hyperlipidemia  -restart home lipitor 40      Anemia in chronic kidney disease, on chronic dialysis  -Iron 325 mg BID      Neuropathy  -History of falls 2/2 lower extremity neuropathy.  -Patient fell while walking down the steps outside of her house  -Resume home gabapentin 300 BID      VTE Risk Mitigation (From admission, onward)        Ordered     IP VTE HIGH RISK PATIENT  Once      07/11/19 0936     Place sequential compression device  Until discontinued      07/11/19 0936     heparin (porcine) injection 5,000 Units  Every 8 hours      07/09/19 1803     heparin (porcine) injection 5,000 Units  Every 8 hours      07/08/19 1415     heparin (porcine) injection 1,000 Units  As needed (PRN)      07/08/19 1210     Place sequential compression device  Until discontinued      07/06/19 1159                Kev Villegas MD  Department of Hospital Medicine   [facility]

## 2019-07-12 PROBLEM — Z91.89 AT RISK FOR VENOUS THROMBOEMBOLISM (VTE): Status: ACTIVE | Noted: 2019-01-01

## 2019-07-12 NOTE — ASSESSMENT & PLAN NOTE
-reports a clot in vascular access line noticed at dialysis on 07/05  -Neprhology consulted and following  -strict Is/Os, daily weights, renal diet  -Neprhology flushed line with alteplase on 07/07  -HD on 07/08. Went well with 1L fluid removal. Needed Midodrine once for low BP.  -HD on 07/10. Went well with 1.5L fluid removal. Received Midodrine once for low BP.  -HD on 07/12. Went well with 2.5L fluid removal in 3 hours  -07/12, now on Midodrine 10 mg daily  -renal vitamins

## 2019-07-12 NOTE — PROGRESS NOTES
[facility] H[facility]ODIAL[facility] NOTE     Patient currently on hemodialysis for removal of uremic toxins and volume.     Patient seen and evaluated on hemodialysis, tolerating treatment, see HD flowsheet for vitals and assessments.      Ultrafiltration goal is 1-2L as tolerated (unable to weigh patient), keep map >65     Labs have been reviewed and the dialysate bath has been adjusted.     Assessment/Plan:    -Patient seen on HD, tolerating treatment well, w/o complaints   -Renal diet  -Strict I/O's and daily weights  -Daily renal function panels  -Hbg 8.9, continue Epo with HD  -Phos 5.3, continue renvlea with meals   -Will continue to follow while inpatient       BERNICE Polanco, AGNP-C  Nephrology  Pager:  [phone]

## 2019-07-12 NOTE — ASSESSMENT & PLAN NOTE
-07/06, XR showing tibial fracture  -Ortho consulted and following  -PT/OT consulted and following  -Ortho did ex-fix on 07/06 with plans for definitive surgery at a later date  -07/11, underwent definitive surgery with Ortho 07/11  -Pain control via ropivacaine nerve block and oxycodone 5, 10, 15 PRNs

## 2019-07-12 NOTE — PROGRESS NOTES
[facility] Hospital Medicine  Progress Note    Patient Name: Jenni Todd  MRN: [id number]  Patient Class: IP- Inpatient   Admission Date: 7/6/2019  Length of Stay: 1 days  Attending Physician: Fiordaliza Llamas MD  Primary Care Provider: Michael Tan Iii, MD    [facility] Medicine Team: [facility] HOSP MED 2 Kev Villegas MD    Subjective:     Principal Problem:Closed bicondylar fracture of right tibial plateau      HPI:  Patient is a 47 yo F with PMH of ESRD (on HD MWF), DM2, neuropathy, HTN, HLD, CAD, obesity, bulging discs in back who presented to the ED via ambulance following a fall. Patient was walking down the steps in front of her house and fell down and twisted her R leg. She described her leg pain as a 10/10. Last night, while at dialysis, her nurse could not aspirate her R femoral line, and she was instructed to come to the ER due to a clot in her line. She was on her way out of her house to come to the ER when she fell. At baseline, she uses a walker to get around due to her neuropathy in her feet and has a history of falls. In 2018, she fractured her hip and was operated on by Dr. Grullon.    She has had an extensive history for recurrent vascular access problems. She was on peritoneal dialysis for 14 years prior to February 2019, at which point she developed peritonitis, was admitted, and had her catheter removed and switched to HD. 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 Her last dialysis was on Wednesday, 07/03. She plans for a peritoneal dialysis catheter placement on July 25th with Dr. Tucker at [facility].    In the ED, XRs of her pelvis, knee, femur, tibia, and ankle of her R leg were ordered. R tibia XR revealed a tibial fracture and signs of osteopenia. Ortho and Nephrology were consulted. Her ASA and plavix were held, she was splinted, and Ortho planned for an ex-fix today, which  was cleared by Nephrology.           Overview/Hospital Course:  Patient was admitted to the floor on 07/06 with a R tibial fracture and a clotted R femoral access line. Orthopedic Surgery and Nephrology were consulted from the ED. Ortho operated on the patient on 07/06 and did an ex-fix with plans for definitive correction surgery at a later date. On 07/07, Nephrology flushed alteplase into her access line, and she is scheduled for dialysis treatment. Her pain is well-controlled with Tylenol and Oxycodone with Dilaudid available for breakthrough pain. Her home medications were resumed upon admission, except her midodrine was held prior to surgery due to concerns for falling BPs.  On 07/08, patient underwent HD and 1L of fluid was removed. On 07/09, Ortho evaluated the swelling of her R knee, and decided to not operate. On 07/10, patient went to HD at which 1.5L of fluid was removed. Her BP dropped to 81/56 following dialysis, and a dose of midodrine 10 mg was ordered. 07/11, patient underwent definitive surgery with Ortho. She tolerated the procedure well and her pain is well-controlled with a ropivacaine nerve block as well as Oxycodone PRN. On 07/12, patient underwent hemodialysis at which 2L was removed over 3.5 hours, and she tolerated it well. PT and OT were consulted for post-operative evaluation.      Review of Systems   Constitutional: Negative for chills and fever.   HENT: Negative for sore throat.    Respiratory: Negative for shortness of breath.    Cardiovascular: Negative for chest pain and leg swelling.   Gastrointestinal: Positive for abdominal distention. Negative for abdominal pain.   Musculoskeletal: Positive for joint swelling.        Diffuse R leg pain   Skin: Negative for rash and wound.   Neurological: Negative for headaches.     Objective:     Vital Signs (Most Recent):  Temp: 98.1 °F (36.7 °C) (07/12/19 1150)  Pulse: 99 (07/12/19 1150)  Resp: 18 (07/12/19 1150)  BP: (!) 102/54 (07/12/19  1300)  SpO2: 100 % (07/12/19 0507) Vital Signs (24h Range):  Temp:  [96.3 °F (35.7 °C)-98.6 °F (37 °C)] 98.1 °F (36.7 °C)  Pulse:  [] 99  Resp:  [9-20] 18  SpO2:  [96 %-100 %] 100 %  BP: ()/(50-72) 102/54     Weight: 90.3 kg (199 lb 1.2 oz)  Body mass index is 31.18 kg/m².    Physical Exam   Constitutional: She is oriented to person, place, and time. She appears well-developed and well-nourished. No distress.   HENT:   Head: Normocephalic and atraumatic.   Eyes: Conjunctivae and EOM are normal.   Neck: Normal range of motion. Neck supple.   Cardiovascular: Normal rate, regular rhythm and normal heart sounds.   No murmur heard.  Pulmonary/Chest: Effort normal and breath sounds normal. No respiratory distress.   Abdominal: Bowel sounds are normal. She exhibits distension. There is no tenderness. There is no rebound.   Musculoskeletal: She exhibits no edema.   Ex-fix removed. Bandages in place. Clean, dry, and intact  Distal pulses intact bilaterally   Neurological: She is alert and oriented to person, place, and time. No sensory deficit.   Skin: Skin is warm and dry.   Psychiatric: She has a normal mood and affect.         CRANIAL NERVES     CN III, IV, VI   Extraocular motions are normal.        Significant Labs: All pertinent labs within the past 24 hours have been reviewed.    Significant Imaging: I have reviewed all pertinent imaging results/findings within the past 24 hours.      Assessment/Plan:      * Closed bicondylar fracture of right tibial plateau - s/p ORIF 7/11/19  -07/06, XR showing tibial fracture  -Ortho consulted and following  -PT/OT consulted and following  -Ortho did ex-fix on 07/06 with plans for definitive surgery at a later date  -07/11, underwent definitive surgery with Ortho 07/11  -Pain control via ropivacaine nerve block and oxycodone 5, 10, 15 PRNs    ESRD (end stage renal disease) on dialysis  -reports a clot in vascular access line noticed at dialysis on 07/05  -Neprhology  consulted and following  -strict Is/Os, daily weights, renal diet  -Neprhology flushed line with alteplase on 07/07  -HD on 07/08. Went well with 1L fluid removal. Needed Midodrine once for low BP.  -HD on 07/10. Went well with 1.5L fluid removal. Received Midodrine once for low BP.  -HD on 07/12. Went well with 2.5L fluid removal in 3 hours  -07/12, now on Midodrine 10 mg daily  -renal vitamins    Bulging discs - symptomatic   -gabapentin 300 BID      CAD (coronary artery disease)  -holding home ASA and plavix      Hyperlipidemia  -restart home lipitor 40      Anemia in chronic kidney disease, on chronic dialysis  -Iron 325 mg BID      Neuropathy  -History of falls 2/2 lower extremity neuropathy.  -Patient fell while walking down the steps outside of her house  -Resume home gabapentin 300 BID        VTE Risk Mitigation (From admission, onward)        Ordered     heparin (porcine) injection 5,000 Units  Every 8 hours      07/12/19 0754     heparin (porcine) injection 5,000 Units  Every 8 hours      07/09/19 1803     heparin (porcine) injection 5,000 Units  Every 8 hours      07/08/19 1415     heparin (porcine) injection 1,000 Units  As needed (PRN)      07/08/19 1210     Place sequential compression device  Until discontinued      07/06/19 1158                Kev Villegas MD  Department of Hospital Medicine   [facility]

## 2019-07-12 NOTE — ANESTHESIA POSTPROCEDURE EVALUATION
Anesthesia Post Evaluation    Patient: Jenni Todd    Procedure(s) Performed: Procedure(s) (LRB):  ORIF, FRACTURE, TIBIA, PLATEAU (Right)  REMOVAL, EXTERNAL FIXATION DEVICE (Right)    Final Anesthesia Type: general  Patient location during evaluation: PACU  Patient participation: Yes- Able to Participate  Level of consciousness: awake  Post-procedure vital signs: reviewed and stable  Pain management: adequate  Airway patency: patent  PONV status at discharge: No PONV  Anesthetic complications: no      Cardiovascular status: blood pressure returned to baseline and stable  Respiratory status: spontaneous ventilation  Hydration status: euvolemic  Follow-up not needed.          Vitals Value Taken Time   /57 7/12/2019  5:07 AM   Temp 37 °C (98.6 °F) 7/12/2019  5:07 AM   Pulse 98 7/12/2019  6:48 AM   Resp 18 7/12/2019  5:07 AM   SpO2 100 % 7/12/2019  5:07 AM         Event Time     Out of Recovery 07/11/2019 19:50:11          Pain/Yuridia Score: Pain Rating Prior to Med Admin: 8 (7/12/2019  5:25 AM)  Yuridia Score: 10 (7/11/2019  7:30 PM)

## 2019-07-12 NOTE — NURSING TRANSFER
Nursing Transfer Note      7/11/2019     Transfer to room 503A from PACU    Transfer via bed    Transfer with cardiac monitoring    Transported by PACU PCT    Medicines sent:     Chart send with patient: Yes    Notified: sister

## 2019-07-12 NOTE — PLAN OF CARE
Problem: Adult Inpatient Plan of Care  Goal: Plan of Care Review  Outcome: Ongoing (interventions implemented as appropriate)  Pt AAOx4. Pain controlled with oral analgesics. Neurovascular intact. Skin intact, except for RLE incision x3 and blister on right great toe. RLE elevated. Bed at lowest position, call light within reach.   Goal: Patient-Specific Goal (Individualization)  Outcome: Ongoing (interventions implemented as appropriate)  Pain control  Goal: Absence of Hospital-Acquired Illness or Injury  Outcome: Ongoing (interventions implemented as appropriate)  Intervention: Prevent Skin Injury  Heels protected, legs elevated.

## 2019-07-12 NOTE — PLAN OF CARE
Problem: Adult Inpatient Plan of Care  Goal: Plan of Care Review  Patient AAOx4, call light and belongings in reach, siderails up x2, right leg elevated, and left leg with scd on and operating.  Patient continues to refuse heparin due to a stated previous reaction. IV site clean,dry and intact, saline locked.  Patient had dialysis this morning, returned with hypotension and increased pain, anesthesia team was present and bolus of ropivacaine administered. PNC remains clean, dry and intact, infusing with no concerns.  Patient tolerating diet with no complaint of N/V.  Patient remains free from falls and safety maintained this shift.

## 2019-07-12 NOTE — PT/OT/SLP PROGRESS
Occupational Therapy      Patient Name:  Jenni Todd   MRN:  [id number]    Patient not seen today secondary to pain. Pt. Reports that she did not receive all her pain meds while at dialysis. RN administered pain meds when pt. Returned to 5th floor but pt. States she is in too much pain to work with therapy. Pt.  Will follow-up 7/13 or 7/14.    Maria Alejandra Raymond, OT  7/12/2019

## 2019-07-12 NOTE — SUBJECTIVE & OBJECTIVE
Principal Problem:Closed bicondylar fracture of right tibial plateau    Principal Orthopedic Problem: same    Interval History: Patient seen and examined at bedside.  No acute events overnight.  Pain controlled.  Surgery yesterday.    Review of patient's allergies indicates:   Allergen Reactions    Flagyl [metronidazole hcl] Nausea And Vomiting    Clindamycin Diarrhea    Metronidazole        Current Facility-Administered Medications   Medication    0.9%  NaCl infusion (for blood administration)    0.9%  NaCl infusion    acetaminophen tablet 650 mg    atorvastatin tablet 40 mg    bisacodyl suppository 10 mg    ceFAZolin injection 2 g    dextrose 10% (D10W) Bolus    dextrose 10% (D10W) Bolus    docusate sodium capsule 100 mg    epoetin adonay-epbx injection 5,800 Units    famotidine tablet 20 mg    ferrous sulfate EC tablet 325 mg    gabapentin capsule 300 mg    glucagon (human recombinant) injection 1 mg    glucose chewable tablet 16 g    glucose chewable tablet 24 g    heparin (porcine) injection 1,000 Units    HYDROmorphone injection 0.2 mg    insulin aspart U-100 pen 0-5 Units    ondansetron injection 4 mg    oxyCODONE immediate release tablet 10 mg    oxyCODONE immediate release tablet 15 mg    oxyCODONE immediate release tablet 5 mg    polyethylene glycol packet 17 g    promethazine (PHENERGAN) 6.25 mg in dextrose 5 % 50 mL IVPB    ropivacaine (PF) 2 mg/ml (0.2%) infusion    senna tablet 8.6 mg    sevelamer carbonate tablet 800 mg    sodium chloride 0.9% flush 10 mL    sodium chloride 0.9% flush 10 mL    vitamin renal formula (B-complex-vitamin c-folic acid) 1 mg per capsule 1 capsule     Objective:     Vital Signs (Most Recent):  Temp: 98.6 °F (37 °C) (07/12/19 0507)  Pulse: 102 (07/12/19 0507)  Resp: 18 (07/12/19 0507)  BP: (!) 109/57 (07/12/19 0507)  SpO2: 100 % (07/12/19 0507) Vital Signs (24h Range):  Temp:  [96.3 °F (35.7 °C)-98.6 °F (37 °C)] 98.6 °F (37 °C)  Pulse:   "[] 102  Resp:  [9-20] 18  SpO2:  [96 %-100 %] 100 %  BP: ()/(42-70) 109/57  Arterial Line BP: (101)/(47) 101/47     Weight: 90.3 kg (199 lb 1.2 oz)  Height: 5' 7" (170.2 cm)  Body mass index is 31.18 kg/m².      Intake/Output Summary (Last 24 hours) at 7/12/2019 0608  Last data filed at 7/11/2019 1615  Gross per 24 hour   Intake --   Output 83 ml   Net -83 ml       Ortho/SPM Exam     AAOx4  NAD  Reg rate  No increased WOB     MSK:    Stage one right ischial ulcer without drainage or SOI    RLE:  Sensory/motor deficits 2/2 block  Compartments soft  Dressing with 1x1cm area of drainage  Leg elevated    Significant Labs:   CBC:   Recent Labs   Lab 07/10/19  0644 07/11/19  0503 07/11/19  1753   WBC 11.68 9.62 10.27   HGB 9.5* 10.4* 8.9*   HCT 31.4* 34.3* 29.0*    206 222     CMP:   Recent Labs   Lab 07/11/19  0503 07/11/19  1753   * 132*   K 4.1 3.9   CL 99 100   CO2 18* 17*   GLU 87 111*   BUN 21* 24*   CREATININE 5.5* 6.0*   CALCIUM 11.1* 10.4   PROT 7.5  --    ALBUMIN 2.5*  --    BILITOT 0.4  --    ALKPHOS 96  --    AST 20  --    ALT <5*  --    ANIONGAP 15 15   EGFRNONAA 8.4* 7.6*       Significant Imaging: I have reviewed all pertinent imaging results/findings.  "

## 2019-07-12 NOTE — ASSESSMENT & PLAN NOTE
48 y.o. female POD1 s/p R tibial plateau ORIF    Pain control: multimodal  PT/OT: NWB RLE  DVT PPx: lovenox, FCDs at all times when not ambulating  Abx: postop Ancef  Labs: pending  Drain: none  Wiggins: none    Dispo: f/u PT recs

## 2019-07-12 NOTE — ANESTHESIA POST-OP PAIN MANAGEMENT
Acute Pain Service Progress Note    Jenni Todd is a 48 y.o., female, [id number].    Surgery:  ORIF, FRACTURE, TIBIA, PLATEAU (Right)  REMOVAL, EXTERNAL FIXATION DEVICE (Right)       Post Op Day #: 1    Catheter type: perineural  popliteal    Infusion type: Ropivacaine 0.2%  6 basal    Problem List:    Active Hospital Problems    Diagnosis  POA    *Closed bicondylar fracture of right tibial plateau - s/p ORIF 7/11/19 [S82.141A]  Yes    Type 2 diabetes mellitus with hyperglycemia, with long-term current use of insulin [E11.65, Z79.4]  Not Applicable    ESRD (end stage renal disease) on dialysis [N18.6, Z99.2]  Not Applicable    Hypertension associated with diabetes [E11.59, I10]  Yes    Neuropathy [G62.9]  Yes    Hyperlipidemia [E78.5]  Yes    Anemia in chronic kidney disease, on chronic dialysis [N18.6, D63.1, Z99.2]  Not Applicable     on Epogen      CAD (coronary artery disease) [I25.10]  Yes     Cath 12/27/2012:   RCA PCI 2.5 x 18 and 3.0 x 26 mm BMS   Patent LAD and LCX   Normal EF     Dr. Berrios for NSTEMI       DAPT score 3         Obesity [E66.9]  Yes      Resolved Hospital Problems   No resolved problems to display.       Subjective:     General appearance of alert, oriented, no complaints   Pain with rest: 3    Numbers   Pain with movement: 6    Numbers   Side Effects    1. Pruritis No    2. Nausea No    3. Motor Blockade No, 0=Ability to raise lower extremities off bed    4. Sedation No, 1=awake and alert    Objective:     Catheter site clean, dry and intact.      Vitals   Vitals:    07/12/19 0935   BP: 92/72   Pulse: 101   Resp:    Temp:         Labs    Admission on 07/06/2019   No results displayed because visit has over 200 results.           Meds   Current Facility-Administered Medications   Medication Dose Route Frequency Provider Last Rate Last Dose    0.9%  NaCl infusion (for blood administration)   Intravenous Q24H PRN Shyam Cohen MD   100 mL at 07/11/19 1625    0.9%  NaCl  infusion   Intravenous Once Shyam Cohen  mL/hr at 07/12/19 0723      acetaminophen tablet 650 mg  650 mg Oral Q6H Shyam Cohen MD   650 mg at 07/12/19 0525    atorvastatin tablet 40 mg  40 mg Oral QHS Shyam Cohen MD   40 mg at 07/11/19 2016    bisacodyl suppository 10 mg  10 mg Rectal Daily PRN Shyam Cohen MD        dextrose 10% (D10W) Bolus  12.5 g Intravenous PRN Shyam Cohen MD        dextrose 10% (D10W) Bolus  25 g Intravenous PRN Shyam Cohen MD        epoetin adonay-epbx injection 5,800 Units  5,800 Units Intravenous Every Mon, Wed, Fri Shyam Cohen MD   5,800 Units at 07/10/19 1042    famotidine tablet 20 mg  20 mg Oral Nightly PRN Shyam Cohen MD        ferrous sulfate EC tablet 325 mg  325 mg Oral Daily Shyam Cohen MD   325 mg at 07/10/19 1324    gabapentin capsule 300 mg  300 mg Oral BID Shyam Cohen MD   300 mg at 07/11/19 2015    glucagon (human recombinant) injection 1 mg  1 mg Intramuscular PRN Shyam Cohen MD        glucose chewable tablet 16 g  16 g Oral PRN Shyam Cohen MD        glucose chewable tablet 24 g  24 g Oral PRN Shyam Cohen MD        heparin (porcine) injection 1,000 Units  1,000 Units Intra-Catheter PRN Shyam Cohen MD   1,000 Units at 07/10/19 1044    heparin (porcine) injection 5,000 Units  5,000 Units Subcutaneous Q8H Fiordaliza Llamas MD        HYDROmorphone injection 0.2 mg  0.2 mg Intravenous Q3H PRN Shyam Cohen MD   0.2 mg at 07/06/19 1820    insulin aspart U-100 pen 0-5 Units  0-5 Units Subcutaneous QID (AC + HS) PRN Shyam Cohen MD        midodrine tablet 10 mg  10 mg Oral TID Kev Villegas MD   10 mg at 07/12/19 0733    midodrine tablet 10 mg  10 mg Oral Once Dusty Scruggs NP        ondansetron injection 4 mg  4 mg Intravenous Q12H PRN Cortez Greenberg MD        oxyCODONE immediate release tablet 10 mg  10 mg Oral Q4H PRN Shyam Wade  MD Noel        oxyCODONE immediate release tablet 15 mg  15 mg Oral Q4H PRN Shyam Coehn MD   15 mg at 07/12/19 0954    oxyCODONE immediate release tablet 5 mg  5 mg Oral Q4H PRN Shyam Cohen MD        polyethylene glycol packet 17 g  17 g Oral Daily Shyam Cohen MD        promethazine (PHENERGAN) 6.25 mg in dextrose 5 % 50 mL IVPB  6.25 mg Intravenous Q6H PRN Cortez Greenberg MD        ropivacaine (PF) 2 mg/ml (0.2%) infusion  6 mL/hr Perineural Continuous Cortez Greenberg MD 6 mL/hr at 07/12/19 0942 6 mL/hr at 07/12/19 0942    senna tablet 8.6 mg  8.6 mg Oral QHS Shyam Cohen MD   8.6 mg at 07/10/19 2134    sevelamer carbonate tablet 800 mg  800 mg Oral TID WM Shyam Cohen MD   800 mg at 07/08/19 1948    sodium chloride 0.9% flush 10 mL  10 mL Intravenous PRN Shyam Cohen MD        vitamin renal formula (B-complex-vitamin c-folic acid) 1 mg per capsule 1 capsule  1 capsule Oral Daily Shyam Cohen MD   1 capsule at 07/10/19 1323        Anticoagulant dose heparin 5000 units tid    Assessment:     Pain control adequate    Plan:     Patient doing well, continue present treatment. Bolused catheter this AM.    Evaluator Kev Munoz        I have reviewed and concur with the resident's history, physical, assessment, and plan.  I have personally interviewed and examined the patient at bedside.  See below addendum for my evaluation and additional findings.    Patient states she is having some pain but seems to be resting comfortably in dialysis. Pain described in sciatic distribution.  PNC bolused.  She is taking oxycodone prn pain. She does have motor blockade from block.  Will check with ortho that they are not concerned about compartments.  Dressing in tact.  Continue current therapy.  Continue current therapy

## 2019-07-12 NOTE — PT/OT/SLP PROGRESS
Physical Therapy      Patient Name:  Jenni Todd   MRN:  [id number]    Patient not seen today secondary to Dialysis in AM. In PM, patient declined due to pain. Will follow-up again at next scheduled visit.    Amy Agarwal, PT

## 2019-07-12 NOTE — PLAN OF CARE
Problem: Adult Inpatient Plan of Care  Goal: Plan of Care Review  HD Tx complete, 2L removed in a 3.5hr Tx. Greg well. System clotted twice. Blood returned via Right Fem cath. Ns flushed, hep locked, capped ,taped

## 2019-07-12 NOTE — SUBJECTIVE & OBJECTIVE
Review of Systems   Constitutional: Negative for chills and fever.   HENT: Negative for sore throat.    Respiratory: Negative for shortness of breath.    Cardiovascular: Negative for chest pain and leg swelling.   Gastrointestinal: Positive for abdominal distention. Negative for abdominal pain.   Musculoskeletal: Positive for joint swelling.        Diffuse R leg pain   Skin: Negative for rash and wound.   Neurological: Negative for headaches.     Objective:     Vital Signs (Most Recent):  Temp: 98.1 °F (36.7 °C) (07/12/19 1150)  Pulse: 99 (07/12/19 1150)  Resp: 18 (07/12/19 1150)  BP: (!) 102/54 (07/12/19 1300)  SpO2: 100 % (07/12/19 0507) Vital Signs (24h Range):  Temp:  [96.3 °F (35.7 °C)-98.6 °F (37 °C)] 98.1 °F (36.7 °C)  Pulse:  [] 99  Resp:  [9-20] 18  SpO2:  [96 %-100 %] 100 %  BP: ()/(50-72) 102/54     Weight: 90.3 kg (199 lb 1.2 oz)  Body mass index is 31.18 kg/m².    Physical Exam   Constitutional: She is oriented to person, place, and time. She appears well-developed and well-nourished. No distress.   HENT:   Head: Normocephalic and atraumatic.   Eyes: Conjunctivae and EOM are normal.   Neck: Normal range of motion. Neck supple.   Cardiovascular: Normal rate, regular rhythm and normal heart sounds.   No murmur heard.  Pulmonary/Chest: Effort normal and breath sounds normal. No respiratory distress.   Abdominal: Bowel sounds are normal. She exhibits distension. There is no tenderness. There is no rebound.   Musculoskeletal: She exhibits no edema.   Ex-fix removed. Bandages in place. Clean, dry, and intact  Distal pulses intact bilaterally   Neurological: She is alert and oriented to person, place, and time. No sensory deficit.   Skin: Skin is warm and dry.   Psychiatric: She has a normal mood and affect.         CRANIAL NERVES     CN III, IV, VI   Extraocular motions are normal.        Significant Labs: All pertinent labs within the past 24 hours have been reviewed.    Significant Imaging: I  have reviewed all pertinent imaging results/findings within the past 24 hours.

## 2019-07-12 NOTE — PROGRESS NOTES
[facility]  Orthopedics  Progress Note    Patient Name: Jenni Todd  MRN: [id number]  Admission Date: 7/6/2019  Hospital Length of Stay: 1 days  Attending Provider: Fiordaliza Llamas MD  Primary Care Provider: Michael Tan Iii, MD  Follow-up For: Procedure(s) (LRB):  ORIF, FRACTURE, TIBIA, PLATEAU (Right)  REMOVAL, EXTERNAL FIXATION DEVICE (Right)    Post-Operative Day: 1 Day Post-Op  Subjective:     Principal Problem:Closed bicondylar fracture of right tibial plateau    Principal Orthopedic Problem: same    Interval History: Patient seen and examined at bedside.  No acute events overnight.  Pain controlled.  Surgery yesterday.    Review of patient's allergies indicates:   Allergen Reactions    Flagyl [metronidazole hcl] Nausea And Vomiting    Clindamycin Diarrhea    Metronidazole        Current Facility-Administered Medications   Medication    0.9%  NaCl infusion (for blood administration)    0.9%  NaCl infusion    acetaminophen tablet 650 mg    atorvastatin tablet 40 mg    bisacodyl suppository 10 mg    ceFAZolin injection 2 g    dextrose 10% (D10W) Bolus    dextrose 10% (D10W) Bolus    docusate sodium capsule 100 mg    epoetin adonay-epbx injection 5,800 Units    famotidine tablet 20 mg    ferrous sulfate EC tablet 325 mg    gabapentin capsule 300 mg    glucagon (human recombinant) injection 1 mg    glucose chewable tablet 16 g    glucose chewable tablet 24 g    heparin (porcine) injection 1,000 Units    HYDROmorphone injection 0.2 mg    insulin aspart U-100 pen 0-5 Units    ondansetron injection 4 mg    oxyCODONE immediate release tablet 10 mg    oxyCODONE immediate release tablet 15 mg    oxyCODONE immediate release tablet 5 mg    polyethylene glycol packet 17 g    promethazine (PHENERGAN) 6.25 mg in dextrose 5 % 50 mL IVPB    ropivacaine (PF) 2 mg/ml (0.2%) infusion    senna tablet 8.6 mg    sevelamer carbonate tablet 800 mg    sodium chloride 0.9% flush  "10 mL    sodium chloride 0.9% flush 10 mL    vitamin renal formula (B-complex-vitamin c-folic acid) 1 mg per capsule 1 capsule     Objective:     Vital Signs (Most Recent):  Temp: 98.6 °F (37 °C) (07/12/19 0507)  Pulse: 102 (07/12/19 0507)  Resp: 18 (07/12/19 0507)  BP: (!) 109/57 (07/12/19 0507)  SpO2: 100 % (07/12/19 0507) Vital Signs (24h Range):  Temp:  [96.3 °F (35.7 °C)-98.6 °F (37 °C)] 98.6 °F (37 °C)  Pulse:  [] 102  Resp:  [9-20] 18  SpO2:  [96 %-100 %] 100 %  BP: ()/(42-70) 109/57  Arterial Line BP: (101)/(47) 101/47     Weight: 90.3 kg (199 lb 1.2 oz)  Height: 5' 7" (170.2 cm)  Body mass index is 31.18 kg/m².      Intake/Output Summary (Last 24 hours) at 7/12/2019 0608  Last data filed at 7/11/2019 1615  Gross per 24 hour   Intake --   Output 83 ml   Net -83 ml       Ortho/SPM Exam     AAOx4  NAD  Reg rate  No increased WOB     MSK:    Stage one right ischial ulcer without drainage or SOI    RLE:  Sensory/motor deficits 2/2 block  Compartments soft  Dressing with 1x1cm area of drainage  Leg elevated    Significant Labs:   CBC:   Recent Labs   Lab 07/10/19  0644 07/11/19  0503 07/11/19  1753   WBC 11.68 9.62 10.27   HGB 9.5* 10.4* 8.9*   HCT 31.4* 34.3* 29.0*    206 222     CMP:   Recent Labs   Lab 07/11/19  0503 07/11/19  1753   * 132*   K 4.1 3.9   CL 99 100   CO2 18* 17*   GLU 87 111*   BUN 21* 24*   CREATININE 5.5* 6.0*   CALCIUM 11.1* 10.4   PROT 7.5  --    ALBUMIN 2.5*  --    BILITOT 0.4  --    ALKPHOS 96  --    AST 20  --    ALT <5*  --    ANIONGAP 15 15   EGFRNONAA 8.4* 7.6*       Significant Imaging: I have reviewed all pertinent imaging results/findings.    Assessment/Plan:     * Closed bicondylar fracture of right tibial plateau - s/p ORIF 7/11/19  48 y.o. female POD1 s/p R tibial plateau ORIF    Pain control: multimodal  PT/OT: NWB RLE  DVT PPx: lovenox, FCDs at all times when not ambulating  Abx: postop Ancef  Labs: pending  Drain: none  Wiggins: none    Dispo: f/u PT " recs            Shyam Cohen MD  Orthopedics  [facility] Note:  I agree with the resident's assessment and plan.    Dominick Desai MD

## 2019-07-13 PROBLEM — E87.20 METABOLIC ACIDOSIS: Status: ACTIVE | Noted: 2019-01-01

## 2019-07-13 NOTE — ASSESSMENT & PLAN NOTE
48 y.o. female POD2 s/p R tibial plateau ORIF    Pain control: multimodal  PT/OT: NWB RLE, Pt refusing PT yesterday 2/2 pain  DVT PPx: lovenox, FCDs at all times when not ambulating  Abx: postop Ancef  Labs: HGB 9.0, stable  Drain: none  Wiggins: none    Dispo: f/u PT recs

## 2019-07-13 NOTE — SUBJECTIVE & OBJECTIVE
Interval History: NAEON.  Patient endorses inability to move Rt foot toes and ankle with intact pulses and sensation. Will pause nerve block and assess her motor function. No fever or chills.    Review of Systems   Constitutional: Negative for chills and fever.   HENT: Negative for congestion, rhinorrhea and sore throat.    Eyes: Negative for photophobia, discharge and visual disturbance.   Respiratory: Negative for shortness of breath.    Cardiovascular: Negative for chest pain and leg swelling.   Gastrointestinal: Positive for abdominal distention. Negative for abdominal pain.   Genitourinary: Negative for difficulty urinating, dysuria and hematuria.   Musculoskeletal: Positive for joint swelling.        Diffuse R leg pain   Skin: Negative for rash and wound.   Neurological: Positive for weakness (Rt foot, ankle and toes ). Negative for dizziness, tremors, numbness and headaches.   Psychiatric/Behavioral: Negative for agitation, confusion and decreased concentration. The patient is not nervous/anxious.      Objective:     Vital Signs (Most Recent):  Temp: 97 °F (36.1 °C) (07/13/19 1156)  Pulse: 89 (07/13/19 1156)  Resp: 18 (07/13/19 1156)  BP: (!) 119/56 (07/13/19 1156)  SpO2: 100 % (07/13/19 1156) Vital Signs (24h Range):  Temp:  [96.2 °F (35.7 °C)-99.2 °F (37.3 °C)] 97 °F (36.1 °C)  Pulse:  [] 89  Resp:  [17-18] 18  SpO2:  [96 %-100 %] 100 %  BP: (106-136)/(53-69) 119/56     Weight: 90.3 kg (199 lb 1.2 oz)  Body mass index is 31.18 kg/m².    Intake/Output Summary (Last 24 hours) at 7/13/2019 1332  Last data filed at 7/13/2019 0508  Gross per 24 hour   Intake 300 ml   Output --   Net 300 ml      Physical Exam   Constitutional: She is oriented to person, place, and time. She appears well-developed and well-nourished. No distress.   HENT:   Head: Normocephalic and atraumatic.   Eyes: Pupils are equal, round, and reactive to light. Conjunctivae and EOM are normal. Right eye exhibits no discharge. Left eye  exhibits no discharge.   Neck: Normal range of motion. Neck supple. No JVD present. No thyromegaly present.   Cardiovascular: Normal rate, regular rhythm and normal heart sounds.   No murmur heard.  Pulmonary/Chest: Effort normal and breath sounds normal. No respiratory distress.   Abdominal: Soft. Bowel sounds are normal. She exhibits distension. There is no tenderness. There is no rebound.   Musculoskeletal: She exhibits no edema.   Ex-fix removed. Bandages in place. Clean, dry, and intact  Distal pulses intact bilaterally   Neurological: She is alert and oriented to person, place, and time. No sensory deficit.   Weakness affecting Rt foot, ankle and toes flection, extension, adduction and abduction    Skin: Skin is warm and dry. She is not diaphoretic.   Psychiatric: She has a normal mood and affect.       Significant Labs: All pertinent labs within the past 24 hours have been reviewed.    Significant Imaging: I have reviewed all pertinent imaging results/findings within the past 24 hours.

## 2019-07-13 NOTE — PT/OT/SLP RE-EVAL
"Physical Therapy Re-evaluation    Patient Name:  Jenni Todd   MRN:  [id number]    Recommendations:     Discharge Recommendations:  nursing facility, skilled   Discharge Equipment Recommendations: none   Barriers to discharge: Inaccessible home and Decreased caregiver support 1 ИВАН and daughter is unable to assist with mobility    Assessment:     Jenni Todd is a 48 y.o. female admitted with a medical diagnosis of Closed bicondylar fracture of right tibial plateau.  She presents with the following impairments/functional limitations:  weakness, impaired endurance, impaired functional mobilty, impaired balance, decreased lower extremity function, pain, decreased ROM, edema, orthopedic precautions, impaired cardiopulmonary response to activity, impaired skin . Pt is limited with functional mobility due to hypotension and pain.    Rehab Prognosis:  fair; patient would benefit from acute skilled PT services to address these deficits and reach maximum level of function.      Recent Surgery: Procedure(s) (LRB):  ORIF, FRACTURE, TIBIA, PLATEAU (Right)  REMOVAL, EXTERNAL FIXATION DEVICE (Right) 2 Days Post-Op    Plan:     During this hospitalization, patient to be seen 5 x/week to address the above listed problems via therapeutic activities, therapeutic exercises, neuromuscular re-education, wheelchair management/training  · Plan of Care Expires:  08/12/19   Plan of Care Reviewed with: patient    Subjective   "My L leg is weak"  Communicated with nurse prior to session.  Patient found supine with perineural catheter upon PT entry to room, agreeable to evaluation.    Pain/Comfort:  · Pain Rating 1: 7/10  · Location - Side 1: Right  · Location - Orientation 1: generalized  · Location 1: leg  · Pain Addressed 1: Reposition, Cessation of Activity, Nurse notified  · Pain Rating Post-Intervention 1: 7/10    Patients cultural, spiritual, Islam conflicts given the current situation: no      Objective:     Patient " found with: perineural catheter     General Precautions: Standard, fall   Orthopedic Precautions:RLE non weight bearing   Braces: N/A     Exams:  · Cognitive Exam:  Patient is oriented to Person and Place  · Sensation:    · -       Intact  light/touch L LE; impaired to lt touch R LE (inconsistent with responses)  · RLE ROM: Deficits: limited hip/knee flex due to pain/edema; ankle DF to neutral  · RLE Strength: Deficits: limited active movement due to pain/edema; ankle DF 2+/5  · LLE ROM: WFL  · LLE Strength: Deficits: hip flex 3+/5; knee flex/ext 4-/5; ankle DF 4/5    Functional Mobility:  · Bed Mobility:     · Supine to Sit: maximal assistance and of 2 persons  · Sit to Supine: total assistance and of 2 persons  · Transfers:     · Sit to Stand:  total assistance and of 1 persons with hand-held assist with minimal clearance of her buttocks from the bed   · Pt's BP monitored throughout treatment: 91/61 in supine; 66/50 upon sitting; 71/55 after sitting ~ 3 min with exs; 91/50 after sitting ~ 18 min prior to return to supine.  ·   AM-PAC 6 CLICK MOBILITY  Total Score:9     Therapeutic Activities and Exercises:   Pt sat on the EOB ~ 18 min with CGA/ minimal assist to avoid post instability. Pt performed B LE exs in sitting x 10 reps: knee ext and ankle pumps (with AAROM on L LE)    Patient left supine with all lines intact, call button in reach and nurse notified.    GOALS:   Multidisciplinary Problems     Physical Therapy Goals        Problem: Physical Therapy Goal    Goal Priority Disciplines Outcome Goal Variances Interventions   Physical Therapy Goal     PT, PT/OT Ongoing (interventions implemented as appropriate)     Description:  Goals to be met by: 19    Patient will increase functional independence with mobility by performin. Supine to sit with Moderate Assistance -not met  2. Sit to supine with Moderate Assistance -not met  3. Sit to stand transfer with Moderate Assistance with RW -not met  4. Bed  to chair transfer with Minimal Assistance using Slideboard with NWB R LE -not met  5. Pt to propel w/c 50ft with B UE on level surface with CGA-not met                       History:     Past Medical History:   Diagnosis Date    Abnormal finding on Pap smear, HPV DNA positive     Anemia associated with chronic renal failure     on Epogen    Blood type B+     Bulging discs - symptomatic      CAD (coronary artery disease)     Cardiomyopathy 3/13/2019    Diabetes mellitus, type 2     ESRD (end stage renal disease) 2004    FSGS (focal segmental glomerulosclerosis)     with collapsing    Hyperlipidemia     Hypertension     Neuropathy     NSTEMI (non-ST elevated myocardial infarction) 3/29/2019    Obesity     Secondary hyperparathyroidism, renal     TIA (transient ischemic attack)     Uterine fibroid     small uterine        Past Surgical History:   Procedure Laterality Date    ANGIOGRAM, CORONARY ARTERY N/A 4/15/2019    Performed by [facility]ier [facility] at [facility] CATH LAB    APPLICATION, EXTERNAL FIXATION DEVICE, LARGE, TIBIA- SYNTHES Right 2019    Performed by Corwin Lopez MD at [facility] OR 2ND FLR    CARDIAC CATHETERIZATION      PCI x 2     SECTION, CLASSIC      COLONOSCOPY N/A 2018    Performed by Rodrigue Russell MD at [facility] ENDO (2ND FLR)    DEBRIDEMENT-WOUND Left 2016    Performed by [facility]ressa Maddox [facility] at [facility] OR    DIALYSIS FISTULA CREATION      multiple fistulas and grafts before PD     EGD (ESOPHAGOGASTRODUODENOSCOPY) N/A 3/14/2019    Performed by Adolph Davila MD at [facility] ENDO    EGD (ESOPHAGOGASTRODUODENOSCOPY) N/A 3/13/2019    Performed by Adolph Davila MD at [facility] ENDO    INCISION AND DRAINAGE (I&D), LABIAL N/A 2016    Performed by Harmony Fernandez MD at [facility] OR    INCISION AND DRAINAGE (I&D), LABIAL (ADD ON) Left 2016    Performed by Teressa Maddox MD at [facility] OR    INCISION AND DRAINAGE OF WOUND Left     VULVAR  ABCESS WITH NECROSIS    Insertion, Catheter, Central Venous, Hemodialysis N/A 3/28/2019    Performed by [facility]o Weeks MD at [facility] CATH LAB    Insertion, Catheter, Central Venous, Hemodialysis N/A 3/18/2019    Performed by Kimo Weeks MD at [facility] CATH LAB    INSERTION, CATHETER, TUNNELED ABORTED Left 3/14/2019    Performed by Servando Solis MD at [facility] OR    INSERTION, GRAFT, ARTERIOVENOUS, RIGHT ARM Right 3/22/2019    Performed by BESSIE Wynn III, MD at [facility] OR 2ND FLR    INSERTION, INTRA-AORTIC BALLOON PUMP  4/15/2019    Performed by Roland Napier MD at [facility] CATH LAB    Left heart cath Left 4/15/2019    Performed by Roland Napier MD at [facility] CATH LAB    ORIF, FRACTURE, TIBIA, PLATEAU Right 7/11/2019    Performed by Dominick Desai MD at [facility] OR 2ND FLR    ORIF, HIP Left 9/13/2018    Performed by Tevin Grullon MD at [facility] OR    PERITONEAL CATHETER INSERTION      PERMCATH REWIRE- TUNNELED CATH REWIRE Left 11/13/2017    Performed by Baldev Munroe MD at [facility] CATH LAB    PERMCATH REWIRE- TUNNELED CATH REWIRE N/A 10/5/2017    Performed by Baldev Munroe MD at [facility] CATH LAB    REMOVAL, CATHETER, DIALYSIS, PERITONEAL N/A 3/14/2019    Performed by Servando Solis MD at [facility] OR    REMOVAL, EXTERNAL FIXATION DEVICE Right 7/11/2019    Performed by Dominick Desai MD at [facility] OR 2ND FLR    REPLACEMENT, CATHETER, DIALYSIS, OVER GUIDEWIRE, USING EXISTING VENOUS ACCESS N/A 6/27/2019    Performed by Kimo Weeks MD at [facility] CATH LAB    REPLACEMENT, CATHETER, DIALYSIS, OVER GUIDEWIRE, USING EXISTING VENOUS ACCESS Left 5/24/2019    Performed by Baldev Munroe MD at [facility] CATH LAB    UMBILICAL HERNIA REPAIR      Venogram, possible intervention Right 3/20/2019    Performed by BESSIE Wynn III, MD at [facility] CATH LAB       Time Tracking:     PT Received On: 07/13/19  PT Start Time: 1002     PT Stop Time: 1036  PT Total Time (min): 34 min     Billable Minutes: Re-eval 14  and Therapeutic Exercise 20      Amelia Alan, PT  07/13/2019

## 2019-07-13 NOTE — NURSING
Patient nauseated refused medication at this time did not eat lunch . Refused heparin injection . Patient N/V liquid yellow brown emesis . Ondansetron 4mg  Iv prn giving by Jarad Vidales Rn

## 2019-07-13 NOTE — PROGRESS NOTES
[facility] Hospital Medicine  Progress Note    Patient Name: Jenni Todd  MRN: [id number]  Patient Class: IP- Inpatient   Admission Date: 7/6/2019  Length of Stay: 2 days  Attending Physician: Fiordaliza Llamas MD  Primary Care Provider: Michael Tan Iii, MD    [facility] Medicine Team: [facility] MED 2 Eliel Phillips MD    Subjective:     Principal Problem:Closed bicondylar fracture of right tibial plateau      HPI:  Patient is a 49 yo F with PMH of ESRD (on HD MWF), DM2, neuropathy, HTN, HLD, CAD, obesity, bulging discs in back who presented to the ED via ambulance following a fall. Patient was walking down the steps in front of her house and fell down and twisted her R leg. She described her leg pain as a 10/10. Last night, while at dialysis, her nurse could not aspirate her R femoral line, and she was instructed to come to the ER due to a clot in her line. She was on her way out of her house to come to the ER when she fell. At baseline, she uses a walker to get around due to her neuropathy in her feet and has a history of falls. In 2018, she fractured her hip and was operated on by Dr. Grullon.    She has had an extensive history for recurrent vascular access problems. She was on peritoneal dialysis for 14 years prior to February 2019, at which point she developed peritonitis, was admitted, and had her catheter removed and switched to HD. 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 Her last dialysis was on Wednesday, 07/03. She plans for a peritoneal dialysis catheter placement on July 25th with Dr. Tucker at [facility].    In the ED, XRs of her pelvis, knee, femur, tibia, and ankle of her R leg were ordered. R tibia XR revealed a tibial fracture and signs of osteopenia. Ortho and Nephrology were consulted. Her ASA and plavix were held, she was splinted, and Ortho planned for an ex-fix today, which  was cleared by Nephrology.           Overview/Hospital Course:  Patient was admitted to the floor on 07/06 with a R tibial fracture and a clotted R femoral access line. Orthopedic Surgery and Nephrology were consulted from the ED. Ortho operated on the patient on 07/06 and did an ex-fix with plans for definitive correction surgery at a later date. On 07/07, Nephrology flushed alteplase into her access line, and she is scheduled for dialysis treatment. Her pain is well-controlled with Tylenol and Oxycodone with Dilaudid available for breakthrough pain. Her home medications were resumed upon admission, except her midodrine was held prior to surgery due to concerns for falling BPs.  On 07/08, patient underwent HD and 1L of fluid was removed. On 07/09, Ortho evaluated the swelling of her R knee, and decided to not operate. On 07/10, patient went to HD at which 1.5L of fluid was removed. Her BP dropped to 81/56 following dialysis, and a dose of midodrine 10 mg was ordered. 07/11, patient underwent definitive surgery with Ortho. She tolerated the procedure well and her pain is well-controlled with a ropivacaine nerve block as well as Oxycodone PRN. On 07/12, patient underwent hemodialysis at which 2L was removed over 3.5 hours, and she tolerated it well. PT and OT were consulted for post-operative evaluation, however, not yet worked with patient due to pain. Patient endorses inability to move Rt foot toes and ankle with intact pulses and sensation. Will pause nerve block and assess her motor function. Will consider resuming DAPT after deemed safe from post procedure stand point.    Interval History: NAEON.  Patient endorses inability to move Rt foot toes and ankle with intact pulses and sensation. Will pause nerve block and assess her motor function. No fever or chills.    Review of Systems   Constitutional: Negative for chills and fever.   HENT: Negative for congestion, rhinorrhea and sore throat.    Eyes: Negative for  photophobia, discharge and visual disturbance.   Respiratory: Negative for shortness of breath.    Cardiovascular: Negative for chest pain and leg swelling.   Gastrointestinal: Positive for abdominal distention. Negative for abdominal pain.   Genitourinary: Negative for difficulty urinating, dysuria and hematuria.   Musculoskeletal: Positive for joint swelling.        Diffuse R leg pain   Skin: Negative for rash and wound.   Neurological: Positive for weakness (Rt foot, ankle and toes ). Negative for dizziness, tremors, numbness and headaches.   Psychiatric/Behavioral: Negative for agitation, confusion and decreased concentration. The patient is not nervous/anxious.      Objective:     Vital Signs (Most Recent):  Temp: 97 °F (36.1 °C) (07/13/19 1156)  Pulse: 89 (07/13/19 1156)  Resp: 18 (07/13/19 1156)  BP: (!) 119/56 (07/13/19 1156)  SpO2: 100 % (07/13/19 1156) Vital Signs (24h Range):  Temp:  [96.2 °F (35.7 °C)-99.2 °F (37.3 °C)] 97 °F (36.1 °C)  Pulse:  [] 89  Resp:  [17-18] 18  SpO2:  [96 %-100 %] 100 %  BP: (106-136)/(53-69) 119/56     Weight: 90.3 kg (199 lb 1.2 oz)  Body mass index is 31.18 kg/m².    Intake/Output Summary (Last 24 hours) at 7/13/2019 1332  Last data filed at 7/13/2019 0508  Gross per 24 hour   Intake 300 ml   Output --   Net 300 ml      Physical Exam   Constitutional: She is oriented to person, place, and time. She appears well-developed and well-nourished. No distress.   HENT:   Head: Normocephalic and atraumatic.   Eyes: Pupils are equal, round, and reactive to light. Conjunctivae and EOM are normal. Right eye exhibits no discharge. Left eye exhibits no discharge.   Neck: Normal range of motion. Neck supple. No JVD present. No thyromegaly present.   Cardiovascular: Normal rate, regular rhythm and normal heart sounds.   No murmur heard.  Pulmonary/Chest: Effort normal and breath sounds normal. No respiratory distress.   Abdominal: Soft. Bowel sounds are normal. She exhibits distension.  There is no tenderness. There is no rebound.   Musculoskeletal: She exhibits no edema.   Ex-fix removed. Bandages in place. Clean, dry, and intact  Distal pulses intact bilaterally   Neurological: She is alert and oriented to person, place, and time. No sensory deficit.   Weakness affecting Rt foot, ankle and toes flection, extension, adduction and abduction    Skin: Skin is warm and dry. She is not diaphoretic.   Psychiatric: She has a normal mood and affect.       Significant Labs: All pertinent labs within the past 24 hours have been reviewed.    Significant Imaging: I have reviewed all pertinent imaging results/findings within the past 24 hours.      Assessment/Plan:      * Closed bicondylar fracture of right tibial plateau - s/p ORIF 7/11/19  -07/06, XR showing tibial fracture  -Ortho consulted and following  -PT/OT consulted and following  -Ortho did ex-fix on 07/06 with plans for definitive surgery at a later date  -07/11, underwent definitive surgery with Ortho 07/11  -Pain control via ropivacaine nerve block and oxycodone 5, 10, 15 PRNs  - 7/13: Patient endorses inability to move Rt foot toes and ankle with intact pulses and sensation. Will pause nerve block and assess her motor function.        Type 2 diabetes mellitus with hyperglycemia, with long-term current use of insulin        Chronic combined systolic and diastolic heart failure        ESRD (end stage renal disease) on dialysis  -reports a clot in vascular access line noticed at dialysis on 07/05  -Neprhology consulted and following  -strict Is/Os, daily weights, renal diet  -Neprhology flushed line with alteplase on 07/07  -HD on 07/08. Went well with 1L fluid removal. Needed Midodrine once for low BP.  -HD on 07/10. Went well with 1.5L fluid removal. Received Midodrine once for low BP.  -HD on 07/12. Went well with 2.5L fluid removal in 3 hours  -07/12, now on Midodrine 10 mg daily  -renal vitamins          Bulging discs - symptomatic    -gabapentin 300 BID      CAD (coronary artery disease)  -holding home ASA and plavix   Will resume once deemed safe from post procedure bleeding stand point       Hyperlipidemia  -restart home lipitor 40      Anemia in chronic kidney disease, on chronic dialysis  -Iron 325 mg BID      Diabetic neuropathy associated with type 2 diabetes mellitus  -History of falls 2/2 lower extremity neuropathy.  -Patient fell while walking down the steps outside of her house  -Resume home gabapentin 300 BID        VTE Risk Mitigation (From admission, onward)        Ordered     heparin (porcine) injection 5,000 Units  Every 8 hours      07/12/19 0754     heparin (porcine) injection 5,000 Units  Every 8 hours      07/09/19 1803     heparin (porcine) injection 5,000 Units  Every 8 hours      07/08/19 1415     heparin (porcine) injection 1,000 Units  As needed (PRN)      07/08/19 1210     Place sequential compression device  Until discontinued      07/06/19 1153                Eliel Phillips MD  Department of Hospital Medicine   [facility]

## 2019-07-13 NOTE — PT/OT/SLP PROGRESS
"Occupational Therapy   Treatment    Name: Jenni Todd  MRN: [id number]  Admitting Diagnosis:  Closed bicondylar fracture of right tibial plateau  2 Days Post-Op    Recommendations:     Discharge Recommendations: nursing facility, skilled  Discharge Equipment Recommendations:  shower chair  Barriers to discharge:  Inaccessible home environment, Decreased caregiver support    Assessment:     Jenni Todd is a 48 y.o. female with a medical diagnosis of Closed bicondylar fracture of right tibial plateau.  She presents with the following performance deficits affecting function:  weakness, impaired endurance, gait instability, impaired functional mobilty, impaired self care skills, impaired balance, decreased lower extremity function, pain, orthopedic precautions, edema, impaired skin.     Pt tolerated session fairly well. Pt agreeable to therapy EOB. Pt sat EOB ~18 minutes w/out LOB but required unilateral UE support whenever performing self-care tasks. Pt's BP monitored throughout session -- in supine: 91/61; 1st sittin/50; 2nd sittin/55. Although pt's BP remains low from beginning to end of session pt never c/o dizziness, feeling lightheaded or as if she were about to faint.     Rehab Prognosis:  Fair; patient would benefit from acute skilled OT services to address these deficits and reach maximum level of function.       Plan:     Patient to be seen 3 x/week to address the above listed problems via self-care/home management, therapeutic activities, therapeutic exercises  · Plan of Care Expires: 19  · Plan of Care Reviewed with: patient    Subjective     "You should know my left leg and both of my arms are weak."    Pain/Comfort:  · Pain Rating 1: 7/10  · Location - Side 1: Right  · Location - Orientation 1: generalized  · Location 1: leg  · Pain Addressed 1: Cessation of Activity, Reposition, Nurse notified  · Pain Rating Post-Intervention 1: 710    Objective:     Communicated with: RN prior " to session.  Patient found HOB elevated with perineural catheter upon OT entry to room.    General Precautions: Standard, fall   Orthopedic Precautions:RLE non weight bearing   Braces: N/A     Occupational Performance:     Bed Mobility:    · Patient completed Scooting/Bridging with maximal assistance  · Patient completed Supine to Sit with maximal assistance  · Patient completed Sit to Supine with maximal assistance     Functional Mobility/Transfers:  · Patient completed Sit <> Stand Transfer with total assistance  with  no assistive device   · Functional Mobility: unable to attempt 2/2 not safe at this time    Activities of Daily Living:  · Grooming: supervision and set up of materials  · Upper Body Dressing: minimum assistance to don back gown as she is unable to perform w/out UE support on bed   · Lower Body Dressing: total assistance donning R sock      AMPA 6 Click ADL: 15    Treatment & Education:  - OT POC  - Importance of OOB / EOB activity  - safety w/ bed mobility     Patient left HOB elevated with all lines intact, call button in reach and RN notifiedEducation:      GOALS:   Multidisciplinary Problems     Occupational Therapy Goals        Problem: Occupational Therapy Goal    Goal Priority Disciplines Outcome Interventions   Occupational Therapy Goal     OT, PT/OT Ongoing (interventions implemented as appropriate)    Description:  Goals to be met by: 8/9/2019    Patient will increase functional independence with ADLs by performing:    UE Dressing with Set-up Assistance.  LE Dressing with Stand-by Assistance.  Toileting from bedside commode with Supervision for hygiene and clothing management.   Supine to sit with Supervision.    Maria Alejandra Raymond OT  7/9/2019                        Time Tracking:     OT Date of Treatment: 07/13/19  OT Start Time: 1007  OT Stop Time: 1033  OT Total Time (min): 26 min    Billable Minutes:Self Care/Home Management 26    Rona Gaston OT  7/13/2019

## 2019-07-13 NOTE — PLAN OF CARE
Problem: Physical Therapy Goal  Goal: Physical Therapy Goal  Goals to be met by: 19    Patient will increase functional independence with mobility by performin. Supine to sit with Moderate Assistance -not met  2. Sit to supine with Moderate Assistance -not met  3. Sit to stand transfer with Moderate Assistance with RW -not met  4. Bed to chair transfer with Minimal Assistance using Slideboard with NWB R LE -not met     Outcome: Ongoing (interventions implemented as appropriate)  Re-eval completed, pt's goals remain appropriate and pt will continue to benefit from skilled PT services to work towards improved functional mobility including: bed mobility, transfers, and w/c mobility.   Amelia Alan, PT  2019

## 2019-07-13 NOTE — SUBJECTIVE & OBJECTIVE
Principal Problem:Closed bicondylar fracture of right tibial plateau    Principal Orthopedic Problem: same    Interval History: Patient seen and examined at bedside.  No acute events overnight.  Pain controlled.  POD 2.     Review of patient's allergies indicates:   Allergen Reactions    Flagyl [metronidazole hcl] Nausea And Vomiting    Clindamycin Diarrhea    Metronidazole        Current Facility-Administered Medications   Medication    0.9%  NaCl infusion (for blood administration)    [START ON 7/15/2019] 0.9%  NaCl infusion    [START ON 7/15/2019] 0.9%  NaCl infusion    acetaminophen tablet 650 mg    atorvastatin tablet 40 mg    bisacodyl suppository 10 mg    dextrose 10% (D10W) Bolus    dextrose 10% (D10W) Bolus    epoetin adonay-epbx injection 5,800 Units    famotidine tablet 20 mg    ferrous sulfate EC tablet 325 mg    gabapentin capsule 300 mg    glucagon (human recombinant) injection 1 mg    glucose chewable tablet 16 g    glucose chewable tablet 24 g    heparin (porcine) injection 1,000 Units    heparin (porcine) injection 5,000 Units    HYDROmorphone injection 0.2 mg    insulin aspart U-100 pen 0-5 Units    midodrine tablet 10 mg    ondansetron injection 4 mg    oxyCODONE immediate release tablet 10 mg    oxyCODONE immediate release tablet 15 mg    oxyCODONE immediate release tablet 5 mg    polyethylene glycol packet 17 g    promethazine (PHENERGAN) 6.25 mg in dextrose 5 % 50 mL IVPB    ropivacaine (PF) 2 mg/ml (0.2%) infusion    senna tablet 8.6 mg    sevelamer carbonate tablet 800 mg    sodium chloride 0.9% flush 10 mL    vitamin renal formula (B-complex-vitamin c-folic acid) 1 mg per capsule 1 capsule     Objective:     Vital Signs (Most Recent):  Temp: 97.6 °F (36.4 °C) (07/13/19 0801)  Pulse: 97 (07/13/19 0801)  Resp: 18 (07/13/19 0801)  BP: (!) 110/53 (07/13/19 0801)  SpO2: 96 % (07/13/19 0801) Vital Signs (24h Range):  Temp:  [96.2 °F (35.7 °C)-99.2 °F (37.3 °C)] 97.6  "°F (36.4 °C)  Pulse:  [] 97  Resp:  [17-18] 18  SpO2:  [96 %-100 %] 96 %  BP: ()/(53-69) 110/53     Weight: 90.3 kg (199 lb 1.2 oz)  Height: 5' 7" (170.2 cm)  Body mass index is 31.18 kg/m².      Intake/Output Summary (Last 24 hours) at 7/13/2019 1016  Last data filed at 7/13/2019 0508  Gross per 24 hour   Intake 780 ml   Output 2307 ml   Net -1527 ml       Ortho/SPM Exam     AAOx4  NAD  Reg rate  No increased WOB     MSK:    Stage one right ischial ulcer without drainage or SOI    RLE:  Sensory/motor deficits 2/2 block  Compartments soft  TTP lower leg circumferentially   Dressing with 1x1cm area of drainage, no change  Leg elevated    Significant Labs:   CBC:   Recent Labs   Lab 07/11/19  1753 07/12/19  0749 07/13/19  0442   WBC 10.27 10.29 12.16   HGB 8.9* 9.1* 9.0*   HCT 29.0* 30.1* 29.5*    180 212     CMP:   Recent Labs   Lab 07/12/19  0720 07/12/19  0749 07/13/19  0442   * 133* 129*   K 4.5 3.5 4.0   CL 96 98 95   CO2 20* 22* 18*   * 108 91   BUN 28* 14 18   CREATININE 6.9* 3.8* 4.7*   CALCIUM 10.5 10.0 10.7*   PROT 7.0  --   --    ALBUMIN 2.2* 2.4* 2.3*   BILITOT 0.3  --   --    ALKPHOS 96  --   --    AST 19  --   --    ALT <5*  --   --    ANIONGAP 13 13 16   EGFRNONAA 6.4* 13.2* 10.2*       Significant Imaging: I have reviewed all pertinent imaging results/findings.  "

## 2019-07-13 NOTE — ANESTHESIA POST-OP PAIN MANAGEMENT
Acute Pain Service Progress Note    Jenni Todd is a 48 y.o., female, [id number].    Surgery:  ORIF, FRACTURE, TIBIA, PLATEAU (Right)  REMOVAL, EXTERNAL FIXATION DEVICE (Right)       Post Op Day #: 2    Catheter type: perineural  popliteal    Infusion type: Ropivacaine 0.2%  6 basal      Subjective: Patient states she is having some pain but seems to be resting comfortably in bed. She is taking oxycodone prn for pain. She does have motor blockade from block, but she also endorses prior history of motor weakness.      Problem List:    Active Hospital Problems    Diagnosis  POA    *Closed bicondylar fracture of right tibial plateau - s/p ORIF 7/11/19 [S82.141A]  Yes    Metabolic acidosis [E87.2]  Yes    At risk for venous thromboembolism (VTE) [Z91.89]  Yes     -heparin 5k sq q8h      Type 2 diabetes mellitus with hyperglycemia, with long-term current use of insulin [E11.65, Z79.4]  Not Applicable    Chronic combined systolic and diastolic heart failure [I50.42]  Yes    ESRD (end stage renal disease) on dialysis [N18.6, Z99.2]  Not Applicable    Hypertension associated with diabetes [E11.59, I10]  Yes    Diabetic neuropathy associated with type 2 diabetes mellitus [E11.40]  Yes    Hyperlipidemia [E78.5]  Yes    Anemia in chronic kidney disease, on chronic dialysis [N18.6, D63.1, Z99.2]  Not Applicable     on Epogen      CAD (coronary artery disease) [I25.10]  Yes     Cath 12/27/2012:   RCA PCI 2.5 x 18 and 3.0 x 26 mm BMS   Patent LAD and LCX   Normal EF     Dr. Berrios for NSTEMI       DAPT score 3         Class 1 obesity in adult [E66.9]  Yes      Resolved Hospital Problems   No resolved problems to display.       Subjective:     General appearance of alert, oriented, no complaints   Pain with rest: 3    Numbers   Pain with movement: 6    Numbers   Side Effects    1. Pruritis No    2. Nausea No    3. Motor Blockade Yes, 1=Ability to bend knees and ankles    4. Sedation No, 1=awake and  alert    Objective:     Catheter site clean, dry and intact.      Vitals   Vitals:    07/13/19 0801   BP: (!) 110/53   Pulse: 97   Resp: 18   Temp: 36.4 °C (97.6 °F)        Labs    Admission on 07/06/2019   No results displayed because visit has over 200 results.           Meds   Current Facility-Administered Medications   Medication Dose Route Frequency Provider Last Rate Last Dose    0.9%  NaCl infusion (for blood administration)   Intravenous Q24H PRN Shyam Cohen MD   100 mL at 07/11/19 1625    acetaminophen tablet 650 mg  650 mg Oral Q6H Shyam Cohen MD   650 mg at 07/13/19 0515    atorvastatin tablet 40 mg  40 mg Oral QHS Shyam Cohen MD   40 mg at 07/12/19 2054    bisacodyl suppository 10 mg  10 mg Rectal Daily PRN Shyam Cohen MD        dextrose 10% (D10W) Bolus  12.5 g Intravenous PRN Shyam Cohen MD        dextrose 10% (D10W) Bolus  25 g Intravenous PRN Shyam Cohen MD        epoetin adonay-epbx injection 5,800 Units  5,800 Units Intravenous Every Mon, Wed, Fri Shyam Cohen MD   5,800 Units at 07/12/19 1107    famotidine tablet 20 mg  20 mg Oral Nightly PRN Shyam Cohen MD        ferrous sulfate EC tablet 325 mg  325 mg Oral Daily Shyam Cohen MD   325 mg at 07/12/19 1227    gabapentin capsule 300 mg  300 mg Oral BID Shyam Cohen MD   300 mg at 07/12/19 2054    glucagon (human recombinant) injection 1 mg  1 mg Intramuscular PRN Shyam Cohen MD        glucose chewable tablet 16 g  16 g Oral PRN Shyam Cohen MD        glucose chewable tablet 24 g  24 g Oral PRN Shyam Cohen MD        heparin (porcine) injection 1,000 Units  1,000 Units Intra-Catheter PRN Shyam Cohen MD   1,000 Units at 07/12/19 1117    heparin (porcine) injection 5,000 Units  5,000 Units Subcutaneous Q8H Fiordaliza Llamas MD        HYDROmorphone injection 0.2 mg  0.2 mg Intravenous Q3H PRN Shyam Cohen MD   0.2 mg at 07/06/19 8946     insulin aspart U-100 pen 0-5 Units  0-5 Units Subcutaneous QID (AC + HS) PRN Shyam Cohen MD        midodrine tablet 10 mg  10 mg Oral TID Kev Villegas MD   10 mg at 07/12/19 2054    ondansetron injection 4 mg  4 mg Intravenous Q12H PRN Cortez Greenberg MD        oxyCODONE immediate release tablet 10 mg  10 mg Oral Q4H PRN Shyam Cohen MD        oxyCODONE immediate release tablet 15 mg  15 mg Oral Q4H PRN Shyam Cohen MD   15 mg at 07/13/19 0551    oxyCODONE immediate release tablet 5 mg  5 mg Oral Q4H PRN Shyam Cohen MD        polyethylene glycol packet 17 g  17 g Oral Daily Shyam Cohen MD   17 g at 07/12/19 1228    promethazine (PHENERGAN) 6.25 mg in dextrose 5 % 50 mL IVPB  6.25 mg Intravenous Q6H PRN Cortez Greenberg MD        ropivacaine (PF) 2 mg/ml (0.2%) infusion  6 mL/hr Perineural Continuous Cortez Greenberg MD 6 mL/hr at 07/12/19 2304 6 mL/hr at 07/12/19 2304    senna tablet 8.6 mg  8.6 mg Oral QHS Shyam Cohen MD   8.6 mg at 07/12/19 2055    sevelamer carbonate tablet 800 mg  800 mg Oral TID WM Shyam Cohen MD   800 mg at 07/08/19 1948    sodium chloride 0.9% flush 10 mL  10 mL Intravenous PRN Shyam Cohen MD        vitamin renal formula (B-complex-vitamin c-folic acid) 1 mg per capsule 1 capsule  1 capsule Oral Daily Shyam Cohen MD   1 capsule at 07/10/19 1323        Anticoagulant dose heparin 5000 units tid    Assessment:     Pain control adequate    Plan:     Patient doing well, continue present treatment.     Primary team wishes to perform exam on lower extremity without PNC influence. It is OK and appropriate to pause PNC to evaluate lower extremity given patient's vague history of muscular weakness. Can restart PNC after exam.     Evaluator Gianluca Mcqueen

## 2019-07-13 NOTE — PROGRESS NOTES
[facility]  Orthopedics  Progress Note    Patient Name: Jenni Todd  MRN: [id number]  Admission Date: 7/6/2019  Hospital Length of Stay: 2 days  Attending Provider: Fiordaliza Llamas MD  Primary Care Provider: Michael Tan Iii, MD  Follow-up For: Procedure(s) (LRB):  ORIF, FRACTURE, TIBIA, PLATEAU (Right)  REMOVAL, EXTERNAL FIXATION DEVICE (Right)    Post-Operative Day: 2 Days Post-Op  Subjective:     Principal Problem:Closed bicondylar fracture of right tibial plateau    Principal Orthopedic Problem: same    Interval History: Patient seen and examined at bedside.  No acute events overnight.  Pain controlled.  POD 2.     Review of patient's allergies indicates:   Allergen Reactions    Flagyl [metronidazole hcl] Nausea And Vomiting    Clindamycin Diarrhea    Metronidazole        Current Facility-Administered Medications   Medication    0.9%  NaCl infusion (for blood administration)    [START ON 7/15/2019] 0.9%  NaCl infusion    [START ON 7/15/2019] 0.9%  NaCl infusion    acetaminophen tablet 650 mg    atorvastatin tablet 40 mg    bisacodyl suppository 10 mg    dextrose 10% (D10W) Bolus    dextrose 10% (D10W) Bolus    epoetin adonay-epbx injection 5,800 Units    famotidine tablet 20 mg    ferrous sulfate EC tablet 325 mg    gabapentin capsule 300 mg    glucagon (human recombinant) injection 1 mg    glucose chewable tablet 16 g    glucose chewable tablet 24 g    heparin (porcine) injection 1,000 Units    heparin (porcine) injection 5,000 Units    HYDROmorphone injection 0.2 mg    insulin aspart U-100 pen 0-5 Units    midodrine tablet 10 mg    ondansetron injection 4 mg    oxyCODONE immediate release tablet 10 mg    oxyCODONE immediate release tablet 15 mg    oxyCODONE immediate release tablet 5 mg    polyethylene glycol packet 17 g    promethazine (PHENERGAN) 6.25 mg in dextrose 5 % 50 mL IVPB    ropivacaine (PF) 2 mg/ml (0.2%) infusion    senna tablet 8.6 mg     "sevelamer carbonate tablet 800 mg    sodium chloride 0.9% flush 10 mL    vitamin renal formula (B-complex-vitamin c-folic acid) 1 mg per capsule 1 capsule     Objective:     Vital Signs (Most Recent):  Temp: 97.6 °F (36.4 °C) (07/13/19 0801)  Pulse: 97 (07/13/19 0801)  Resp: 18 (07/13/19 0801)  BP: (!) 110/53 (07/13/19 0801)  SpO2: 96 % (07/13/19 0801) Vital Signs (24h Range):  Temp:  [96.2 °F (35.7 °C)-99.2 °F (37.3 °C)] 97.6 °F (36.4 °C)  Pulse:  [] 97  Resp:  [17-18] 18  SpO2:  [96 %-100 %] 96 %  BP: ()/(53-69) 110/53     Weight: 90.3 kg (199 lb 1.2 oz)  Height: 5' 7" (170.2 cm)  Body mass index is 31.18 kg/m².      Intake/Output Summary (Last 24 hours) at 7/13/2019 1016  Last data filed at 7/13/2019 0508  Gross per 24 hour   Intake 780 ml   Output 2307 ml   Net -1527 ml       Ortho/SPM Exam     AAOx4  NAD  Reg rate  No increased WOB     MSK:    Stage one right ischial ulcer without drainage or SOI    RLE:  Sensory/motor deficits 2/2 block  Compartments soft  TTP lower leg circumferentially   Dressing with 1x1cm area of drainage, no change  Leg elevated    Significant Labs:   CBC:   Recent Labs   Lab 07/11/19  1753 07/12/19  0749 07/13/19  0442   WBC 10.27 10.29 12.16   HGB 8.9* 9.1* 9.0*   HCT 29.0* 30.1* 29.5*    180 212     CMP:   Recent Labs   Lab 07/12/19  0720 07/12/19  0749 07/13/19  0442   * 133* 129*   K 4.5 3.5 4.0   CL 96 98 95   CO2 20* 22* 18*   * 108 91   BUN 28* 14 18   CREATININE 6.9* 3.8* 4.7*   CALCIUM 10.5 10.0 10.7*   PROT 7.0  --   --    ALBUMIN 2.2* 2.4* 2.3*   BILITOT 0.3  --   --    ALKPHOS 96  --   --    AST 19  --   --    ALT <5*  --   --    ANIONGAP 13 13 16   EGFRNONAA 6.4* 13.2* 10.2*       Significant Imaging: I have reviewed all pertinent imaging results/findings.    Assessment/Plan:     * Closed bicondylar fracture of right tibial plateau - s/p ORIF 7/11/19  48 y.o. female POD2 s/p R tibial plateau ORIF    Pain control: multimodal  PT/OT: JESUS MELÉNDEZ, " Pt refusing PT yesterday 2/2 pain  DVT PPx: lovenox, FCDs at all times when not ambulating  Abx: postop Ancef  Labs: HGB 9.0, stable  Drain: none  Wiggins: none    Dispo: f/u PT recs            Kev Casas MD  Orthopedics  [facility]

## 2019-07-13 NOTE — NURSING
"Patient continuous to N/V noted 200 cc brown liquid emesis . Patient stated, " passing gas . Bowels sound present . Patient stated, " last Bm was not yesterday . And still nauseated and unable  take medication by mouth . Sn called and spoked with Dr Caitlyn Orozco notified of the above information  patient N/V x 3 last 200 cc brown emesis in patient room . Notified patient v/s and bs  Patient has received ondansetron at 220 pm and phenergan at 336 pm. Sn received new order .  "

## 2019-07-13 NOTE — ASSESSMENT & PLAN NOTE
-holding home ASA and plavix   Will resume once deemed safe from post procedure bleeding stand point

## 2019-07-13 NOTE — ASSESSMENT & PLAN NOTE
-07/06, XR showing tibial fracture  -Ortho consulted and following  -PT/OT consulted and following  -Ortho did ex-fix on 07/06 with plans for definitive surgery at a later date  -07/11, underwent definitive surgery with Ortho 07/11  -Pain control via ropivacaine nerve block and oxycodone 5, 10, 15 PRNs  - 7/13: Patient endorses inability to move Rt foot toes and ankle with intact pulses and sensation. Will pause nerve block and assess her motor function.

## 2019-07-13 NOTE — PLAN OF CARE
Problem: Occupational Therapy Goal  Goal: Occupational Therapy Goal  Goals to be met by: 8/9/2019    Patient will increase functional independence with ADLs by performing:    UE Dressing with Set-up Assistance.  LE Dressing with Stand-by Assistance.  Toileting from bedside commode with Supervision for hygiene and clothing management.   Supine to sit with Supervision.    Maria Alejandra Raymond OT  7/9/2019       Outcome: Ongoing (interventions implemented as appropriate)  Pt participating well     Comments: Cont OT POC

## 2019-07-14 PROBLEM — Z79.4 TYPE 2 DIABETES MELLITUS WITH HYPERGLYCEMIA, WITH LONG-TERM CURRENT USE OF INSULIN: Status: RESOLVED | Noted: 2019-01-01 | Resolved: 2019-01-01

## 2019-07-14 PROBLEM — E11.65 TYPE 2 DIABETES MELLITUS WITH HYPERGLYCEMIA, WITH LONG-TERM CURRENT USE OF INSULIN: Status: RESOLVED | Noted: 2019-01-01 | Resolved: 2019-01-01

## 2019-07-14 NOTE — ANESTHESIA POST-OP PAIN MANAGEMENT
Acute Pain Service Progress Note      Surgery:  Procedure(s) (LRB):  ORIF, FRACTURE, TIBIA, PLATEAU (Right)  REMOVAL, EXTERNAL FIXATION DEVICE (Right)    Jenni Todd is a 48 y.o. female.    Interval Hx: NAEON. Pain well controlled this AM. Plan to continue PNC pending discharge planning.    Post Op Day #: 3    Catheter type: perineural  popliteal catheter    Infusion type: Ropivacaine 0.2%  6 ml/hr basal    Problem List:    Active Hospital Problems    Diagnosis  POA    *Closed bicondylar fracture of right tibial plateau - s/p ORIF 7/11/19 [S82.141A]  Yes    Metabolic acidosis [E87.2]  Yes    At risk for venous thromboembolism (VTE) [Z91.89]  Yes     -heparin 5k sq q8h      Type 2 diabetes mellitus with hyperglycemia, with long-term current use of insulin [E11.65, Z79.4]  Not Applicable    Chronic combined systolic and diastolic heart failure [I50.42]  Yes    ESRD (end stage renal disease) on dialysis [N18.6, Z99.2]  Not Applicable    Hypertension associated with diabetes [E11.59, I10]  Yes    Diabetic neuropathy associated with type 2 diabetes mellitus [E11.40]  Yes    Hyperlipidemia [E78.5]  Yes    Anemia in chronic kidney disease, on chronic dialysis [N18.6, D63.1, Z99.2]  Not Applicable     on Epogen      CAD (coronary artery disease) [I25.10]  Yes     Cath 12/27/2012:   RCA PCI 2.5 x 18 and 3.0 x 26 mm BMS   Patent LAD and LCX   Normal EF     Dr. Berrios for NSTEMI       DAPT score 3         Class 1 obesity in adult [E66.9]  Yes      Resolved Hospital Problems   No resolved problems to display.       Subjective:    General appearance of alert, oriented, no complaints  Pain with rest: 1    Numbers  Pain with movement: 3    Numbers  Side Effects:   1. Pruritis: No   2. Nausea: No   3. Motor Blockade: No, 0=Ability to raise lower extremities off bed   4. Sedation: No, 1=awake and alert    Schedule Medications:    [START ON 7/15/2019] sodium chloride 0.9%   Intravenous Once    acetaminophen   650 mg Oral Q6H    atorvastatin  40 mg Oral QHS    epoetin adonay-ebpx (RETACRIT) injection  5,800 Units Intravenous Every Mon, Wed, Fri    ferrous sulfate  325 mg Oral Daily    gabapentin  300 mg Oral BID    heparin (porcine)  5,000 Units Subcutaneous Q8H    midodrine  10 mg Oral TID    polyethylene glycol  17 g Oral Daily    senna  8.6 mg Oral QHS    sevelamer carbonate  800 mg Oral TID WM    vitamin renal formula (B-complex-vitamin c-folic acid)  1 capsule Oral Daily        Continuous Infusions:   ropivacaine (PF) 2 mg/ml (0.2%) 6 mL/hr (07/14/19 0637)        PRN Medications:  sodium chloride, [START ON 7/15/2019] sodium chloride 0.9%, bisacodyl, Dextrose 10% Bolus, Dextrose 10% Bolus, famotidine, glucagon (human recombinant), glucose, glucose, heparin (porcine), HYDROmorphone, insulin aspart U-100, ondansetron, oxyCODONE, oxyCODONE, oxyCODONE, promethazine (PHENERGAN) IVPB, sodium chloride 0.9%       Antibiotics:  Antibiotics (From admission, onward)    None           Objective:  Catheter site: clean, dry, intact    Vital Signs (Most Recent):  Temp: 35.8 °C (96.5 °F) (07/14/19 0739)  Pulse: 99 (07/14/19 0739)  Resp: 15 (07/14/19 0739)  BP: 100/64 (07/14/19 0739)  SpO2: 98 % (07/14/19 0739) Vital Signs Range (Last 24H):  Temp:  [35.8 °C (96.4 °F)-36.7 °C (98.1 °F)]   Pulse:  []   Resp:  [15-18]   BP: (100-132)/(56-75)   SpO2:  [95 %-100 %]      I & O (Last 24H):    Intake/Output Summary (Last 24 hours) at 7/14/2019 0921  Last data filed at 7/13/2019 1817  Gross per 24 hour   Intake 350 ml   Output 400 ml   Net -50 ml       Physical Exam:  GA: Alert, comfortable, no acute distress.   Pulmonary: Clear to auscultation A/P/L. No wheezing, crackles, or rhonchi.  Cardiac: RRR S1 & S2 w/o rubs/murmurs/gallops.   Abdominal:Bowel sounds present. No tenderness to palpation or distension. No appreciable hepatosplenomegaly.   Skin: No jaundice, rashes, or visible lesions.    Laboratory:  CBC:   Recent Labs      07/13/19 0442 07/14/19  0345   WBC 12.16 12.85*   RBC 2.85* 3.05*   HGB 9.0* 9.7*   HCT 29.5* 33.7*    235   * 111*   MCH 31.6* 31.8*   MCHC 30.5* 28.8*       BMP:   Recent Labs     07/13/19 0442 07/14/19  0345   * 131*   K 4.0 4.4   CO2 18* 14*   CL 95 95   BUN 18 26*   CREATININE 4.7* 5.8*   GLU 91 117*   MG 2.1 2.4   PHOS 4.7* 5.8*   CALCIUM 10.7* 11.6*       No results for input(s): PT, INR, PROTIME, APTT in the last 72 hours.    Anticoagulant: Heparin 5000u subq Q8H    Assessment:    Pain control: adequate    Plan:  Patient doing well, continue present treatment. Plan to continue PNC while discharge planning. Will follow up with PT/OT regarding recommendations.      David Pierce MD  PGY-IV, CA-3  Pager: [phone]

## 2019-07-14 NOTE — PROGRESS NOTES
[facility]  Orthopedics  Progress Note    Patient Name: Jenni Todd  MRN: [id number]  Admission Date: 7/6/2019  Hospital Length of Stay: 3 days  Attending Provider: Fiordaliza Llamas MD  Primary Care Provider: Micheal Tan Iii, MD  Follow-up For: Procedure(s) (LRB):  ORIF, FRACTURE, TIBIA, PLATEAU (Right)  REMOVAL, EXTERNAL FIXATION DEVICE (Right)    Post-Operative Day: 3 Days Post-Op  Subjective:     Principal Problem:Closed bicondylar fracture of right tibial plateau    Principal Orthopedic Problem: same    Interval History: Patient seen and examined at bedside. One episode of emesis in evening, pt given antiemetic, resolved this AM.  Pain controlled.  POD 3. Pt endorses improvement since yesterday and better sleep last night.     Review of patient's allergies indicates:   Allergen Reactions    Flagyl [metronidazole hcl] Nausea And Vomiting    Clindamycin Diarrhea    Metronidazole        Current Facility-Administered Medications   Medication    0.9%  NaCl infusion (for blood administration)    [START ON 7/15/2019] 0.9%  NaCl infusion    [START ON 7/15/2019] 0.9%  NaCl infusion    acetaminophen tablet 650 mg    atorvastatin tablet 40 mg    bisacodyl suppository 10 mg    dextrose 10% (D10W) Bolus    dextrose 10% (D10W) Bolus    epoetin adonay-epbx injection 5,800 Units    famotidine tablet 20 mg    ferrous sulfate EC tablet 325 mg    gabapentin capsule 300 mg    glucagon (human recombinant) injection 1 mg    glucose chewable tablet 16 g    glucose chewable tablet 24 g    heparin (porcine) injection 1,000 Units    heparin (porcine) injection 5,000 Units    HYDROmorphone injection 0.2 mg    insulin aspart U-100 pen 0-5 Units    midodrine tablet 10 mg    ondansetron injection 4 mg    oxyCODONE immediate release tablet 10 mg    oxyCODONE immediate release tablet 15 mg    oxyCODONE immediate release tablet 5 mg    polyethylene glycol packet 17 g    promethazine  "(PHENERGAN) 6.25 mg in dextrose 5 % 50 mL IVPB    ropivacaine (PF) 2 mg/ml (0.2%) infusion    senna tablet 8.6 mg    sevelamer carbonate tablet 800 mg    sodium chloride 0.9% flush 10 mL    vitamin renal formula (B-complex-vitamin c-folic acid) 1 mg per capsule 1 capsule     Objective:     Vital Signs (Most Recent):  Temp: 96.4 °F (35.8 °C) (07/14/19 0413)  Pulse: 96 (07/14/19 0702)  Resp: 18 (07/14/19 0413)  BP: 107/72 (07/14/19 0413)  SpO2: 100 % (07/14/19 0413) Vital Signs (24h Range):  Temp:  [96.4 °F (35.8 °C)-98.1 °F (36.7 °C)] 96.4 °F (35.8 °C)  Pulse:  [] 96  Resp:  [16-18] 18  SpO2:  [95 %-100 %] 100 %  BP: (107-132)/(53-75) 107/72     Weight: 90.3 kg (199 lb 1.2 oz)  Height: 5' 7" (170.2 cm)  Body mass index is 31.18 kg/m².      Intake/Output Summary (Last 24 hours) at 7/14/2019 0710  Last data filed at 7/13/2019 1817  Gross per 24 hour   Intake 350 ml   Output 400 ml   Net -50 ml       Ortho/SPM Exam     AAOx4  NAD  Reg rate  No increased WOB     MSK:    Stage one right ischial ulcer without drainage or SOI    RLE:  Sensory/motor deficits 2/2 block  Compartments soft  TTP lower leg circumferentially   Dressing with 1x1cm area of drainage, no change  Leg elevated    Significant Labs:   CBC:   Recent Labs   Lab 07/12/19  0749 07/13/19 0442 07/14/19  0345   WBC 10.29 12.16 12.85*   HGB 9.1* 9.0* 9.7*   HCT 30.1* 29.5* 33.7*    212 235     CMP:   Recent Labs   Lab 07/12/19  0720 07/12/19  0749 07/13/19 0442 07/14/19 0345   * 133* 129* 131*   K 4.5 3.5 4.0 4.4   CL 96 98 95 95   CO2 20* 22* 18* 14*   * 108 91 117*   BUN 28* 14 18 26*   CREATININE 6.9* 3.8* 4.7* 5.8*   CALCIUM 10.5 10.0 10.7* 11.6*   PROT 7.0  --   --   --    ALBUMIN 2.2* 2.4* 2.3* 2.3*   BILITOT 0.3  --   --   --    ALKPHOS 96  --   --   --    AST 19  --   --   --    ALT <5*  --   --   --    ANIONGAP 13 13 16 22*   EGFRNONAA 6.4* 13.2* 10.2* 7.9*       Significant Imaging: I have reviewed all pertinent imaging " results/findings.    Assessment/Plan:     * Closed bicondylar fracture of right tibial plateau - s/p ORIF 7/11/19  48 y.o. female POD3 s/p R tibial plateau ORIF    Pain control: multimodal  PT/OT: NWB RLE, Pt worked with PT yesteday, in bed exercises.  DVT PPx: lovenox, FCDs at all times when not ambulating  Abx: postop Ancef  Labs: HGB 9.0->9.7, stable. WBC inc to 12.85 will follow  Drain: none  Rosalie: none    Dispo: f/u PT recs            Kev Casas MD  Orthopedics  [facility]

## 2019-07-14 NOTE — PT/OT/SLP PROGRESS
Physical Therapy Treatment    Patient Name:  Jenni Todd   MRN:  [id number]    Recommendations:     Discharge Recommendations:  nursing facility, skilled   Discharge Equipment Recommendations: none   Barriers to discharge: Inaccessible home and Decreased caregiver support    Assessment:     Jenni Todd is a 48 y.o. female admitted with a medical diagnosis of Closed bicondylar fracture of right tibial plateau.  She presents with the following impairments/functional limitations:  weakness, impaired self care skills, impaired functional mobilty, impaired balance, decreased lower extremity function, decreased ROM, pain, edema, orthopedic precautions, impaired cardiopulmonary response to activity . Patient required time with mobility in order to limit pain on her R leg. Blood pressure was 74/53 mmHg while sitting at EOB .    Rehab Prognosis: Fair; patient would benefit from acute skilled PT services to address these deficits and reach maximum level of function.    Recent Surgery: Procedure(s) (LRB):  ORIF, FRACTURE, TIBIA, PLATEAU (Right)  REMOVAL, EXTERNAL FIXATION DEVICE (Right) 3 Days Post-Op    Plan:     During this hospitalization, patient to be seen 5 x/week to address the identified rehab impairments via therapeutic activities, therapeutic exercises, neuromuscular re-education, wheelchair management/training and progress toward the following goals:    · Plan of Care Expires:  08/12/19    Subjective     Chief Complaint: pain with mobility  Patient/Family Comments/goals: to have no pain when mobilizing.  Pain/Comfort:  · Pain Rating 1: 8/10(Pain is high with mobility only)  · Location - Side 1: Right  · Location - Orientation 1: generalized  · Location 1: leg  · Pain Addressed 1: Pre-medicate for activity, Reposition, Distraction, Cessation of Activity  · Pain Rating Post-Intervention 1: 6/10      Objective:     Communicated with nsg prior to session.  Patient found supine with perineural catheter,  peripheral IV upon PT entry to room.     General Precautions: Standard, fall   Orthopedic Precautions:RLE non weight bearing   Braces: N/A     Functional Mobility:  · Bed Mobility:     · Rolling Right: maximal assistance  · Scooting: maximal assistance  · Supine to Sit: maximal assistance  · Sit to Supine: maximal assistance      AM-PAC 6 CLICK MOBILITY  Turning over in bed (including adjusting bedclothes, sheets and blankets)?: 2  Sitting down on and standing up from a chair with arms (e.g., wheelchair, bedside commode, etc.): 2  Moving from lying on back to sitting on the side of the bed?: 2  Moving to and from a bed to a chair (including a wheelchair)?: 1  Need to walk in hospital room?: 1  Climbing 3-5 steps with a railing?: 1  Basic Mobility Total Score: 9       Therapeutic Activities and Exercises:   R LE gentle PROM x 30 reps on all available planes of motion. Static sitting balance at EOB x 9 minutes with SBA, but had to transfer back to bed as patient was hypotensive.  Repositioned in bed with NSG assistance and elevated the R LE on a pillow.    Patient left HOB elevated with all lines intact, call button in reach and Medical team. present..    GOALS:   Multidisciplinary Problems     Physical Therapy Goals        Problem: Physical Therapy Goal    Goal Priority Disciplines Outcome Goal Variances Interventions   Physical Therapy Goal     PT, PT/OT Ongoing (interventions implemented as appropriate)     Description:  Goals to be met by: 19    Patient will increase functional independence with mobility by performin. Supine to sit with Moderate Assistance -not met  2. Sit to supine with Moderate Assistance -not met  3. Sit to stand transfer with Moderate Assistance with RW -not met  4. Bed to chair transfer with Minimal Assistance using Slideboard with NWB R LE -not met  5. Pt to propel w/c 50ft with B UE on level surface with CGA-not met                       Time Tracking:     PT Received On:  07/14/19  PT Start Time: 1045     PT Stop Time: 1109  PT Total Time (min): 24 min     Billable Minutes: Therapeutic Activity 12 and Therapeutic Exercise 12    Treatment Type: Treatment  PT/PTA: PTA     PTA Visit Number: 1     Bakari Michael PTA  07/14/2019

## 2019-07-14 NOTE — ASSESSMENT & PLAN NOTE
48 y.o. female POD3 s/p R tibial plateau ORIF    Pain control: multimodal  PT/OT: NWB RLE, Pt worked with PT yesteday, in bed exercises.  DVT PPx: lovenox, FCDs at all times when not ambulating  Abx: postop Ancef  Labs: HGB 9.0->9.7, stable. WBC inc to 12.85 will follow  Drain: none  Wiggins: none    Dispo: f/u PT recs

## 2019-07-14 NOTE — CARE UPDATE
Rapid Response Nurse Chart Check     Chart check completed, abnormal VS noted (episode of tachycardia), bedside RN, Tram contacted, no concerns verbalized at this time, instructed to call [phone] for further concerns or assistance.

## 2019-07-14 NOTE — PLAN OF CARE
Problem: Physical Therapy Goal  Goal: Physical Therapy Goal  Goals to be met by: 19    Patient will increase functional independence with mobility by performin. Supine to sit with Moderate Assistance -not met  2. Sit to supine with Moderate Assistance -not met  3. Sit to stand transfer with Moderate Assistance with RW -not met  4. Bed to chair transfer with Minimal Assistance using Slideboard with NWB R LE -not met  5. Pt to propel w/c 50ft with B UE on level surface with CGA-not met      Outcome: Ongoing (interventions implemented as appropriate)  Progress continues to be limited due to pain and low blood pressure .

## 2019-07-14 NOTE — ANESTHESIA POST-OP PAIN MANAGEMENT
Primary team concerned regarding motor deficit and asked for catheter to be paused to assess motor function. Catheter paused and reassessed patient at bedside three hours later. Motor function slightly improved. Pain was well controlled and had not changed since pausing catheter. D/C'd catheter and blue tip was confirmed after removal. Patient tolerated well.

## 2019-07-14 NOTE — ADDENDUM NOTE
Addendum  created 07/14/19 1554 by Danielito Pierce MD    Intraprocedure Event edited, LDA properties accepted, Sign clinical note

## 2019-07-14 NOTE — SUBJECTIVE & OBJECTIVE
Review of Systems   Constitutional: Negative for chills and fever.   HENT: Negative for congestion, rhinorrhea and sore throat.    Eyes: Negative for photophobia, discharge and visual disturbance.   Respiratory: Negative for shortness of breath.    Cardiovascular: Negative for chest pain and leg swelling.   Gastrointestinal: Positive for abdominal distention. Negative for abdominal pain.   Genitourinary: Negative for difficulty urinating, dysuria and hematuria.   Musculoskeletal: Positive for joint swelling.        Diffuse R leg pain   Skin: Negative for rash and wound.   Neurological: Positive for weakness (Rt foot, ankle and toes ). Negative for dizziness, tremors, numbness and headaches.   Psychiatric/Behavioral: Negative for agitation, confusion and decreased concentration. The patient is not nervous/anxious.      Objective:     Vital Signs (Most Recent):  Temp: 96.5 °F (35.8 °C) (07/14/19 0739)  Pulse: 103 (07/14/19 1111)  Resp: 15 (07/14/19 0739)  BP: 100/64 (07/14/19 0739)  SpO2: 98 % (07/14/19 0739) Vital Signs (24h Range):  Temp:  [96.4 °F (35.8 °C)-98.1 °F (36.7 °C)] 96.5 °F (35.8 °C)  Pulse:  [] 103  Resp:  [15-18] 15  SpO2:  [95 %-100 %] 98 %  BP: (100-132)/(60-75) 100/64     Weight: 90.3 kg (199 lb 1.2 oz)  Body mass index is 31.18 kg/m².    Intake/Output Summary (Last 24 hours) at 7/14/2019 1220  Last data filed at 7/13/2019 1817  Gross per 24 hour   Intake 200 ml   Output 400 ml   Net -200 ml      Physical Exam   Constitutional: She is oriented to person, place, and time. She appears well-developed and well-nourished. No distress.   HENT:   Head: Normocephalic and atraumatic.   Eyes: Pupils are equal, round, and reactive to light. Conjunctivae and EOM are normal. Right eye exhibits no discharge. Left eye exhibits no discharge.   Neck: Normal range of motion. Neck supple. No JVD present. No thyromegaly present.   Cardiovascular: Normal rate, regular rhythm and normal heart sounds.   No murmur  heard.  Pulmonary/Chest: Effort normal and breath sounds normal. No respiratory distress.   Abdominal: Soft. Bowel sounds are normal. She exhibits distension. There is no tenderness. There is no rebound.   Musculoskeletal: She exhibits no edema.   Ex-fix removed. Bandages in place. Clean, dry, and intact  Distal pulses intact bilaterally   Neurological: She is alert and oriented to person, place, and time. No sensory deficit.   Weakness affecting Rt foot, ankle and toes flection, extension, adduction and abduction    Skin: Skin is warm and dry. She is not diaphoretic.   Psychiatric: She has a normal mood and affect.       Significant Labs: All pertinent labs within the past 24 hours have been reviewed.    Significant Imaging: I have reviewed all pertinent imaging results/findings within the past 24 hours.

## 2019-07-14 NOTE — SUBJECTIVE & OBJECTIVE
Principal Problem:Closed bicondylar fracture of right tibial plateau    Principal Orthopedic Problem: same    Interval History: Patient seen and examined at bedside. One episode of emesis in evening, pt given antiemetic, resolved this AM.  Pain controlled.  POD 3. Pt endorses improvement since yesterday and better sleep last night.     Review of patient's allergies indicates:   Allergen Reactions    Flagyl [metronidazole hcl] Nausea And Vomiting    Clindamycin Diarrhea    Metronidazole        Current Facility-Administered Medications   Medication    0.9%  NaCl infusion (for blood administration)    [START ON 7/15/2019] 0.9%  NaCl infusion    [START ON 7/15/2019] 0.9%  NaCl infusion    acetaminophen tablet 650 mg    atorvastatin tablet 40 mg    bisacodyl suppository 10 mg    dextrose 10% (D10W) Bolus    dextrose 10% (D10W) Bolus    epoetin adonay-epbx injection 5,800 Units    famotidine tablet 20 mg    ferrous sulfate EC tablet 325 mg    gabapentin capsule 300 mg    glucagon (human recombinant) injection 1 mg    glucose chewable tablet 16 g    glucose chewable tablet 24 g    heparin (porcine) injection 1,000 Units    heparin (porcine) injection 5,000 Units    HYDROmorphone injection 0.2 mg    insulin aspart U-100 pen 0-5 Units    midodrine tablet 10 mg    ondansetron injection 4 mg    oxyCODONE immediate release tablet 10 mg    oxyCODONE immediate release tablet 15 mg    oxyCODONE immediate release tablet 5 mg    polyethylene glycol packet 17 g    promethazine (PHENERGAN) 6.25 mg in dextrose 5 % 50 mL IVPB    ropivacaine (PF) 2 mg/ml (0.2%) infusion    senna tablet 8.6 mg    sevelamer carbonate tablet 800 mg    sodium chloride 0.9% flush 10 mL    vitamin renal formula (B-complex-vitamin c-folic acid) 1 mg per capsule 1 capsule     Objective:     Vital Signs (Most Recent):  Temp: 96.4 °F (35.8 °C) (07/14/19 0413)  Pulse: 96 (07/14/19 0702)  Resp: 18 (07/14/19 0413)  BP: 107/72 (07/14/19  "0413)  SpO2: 100 % (07/14/19 0413) Vital Signs (24h Range):  Temp:  [96.4 °F (35.8 °C)-98.1 °F (36.7 °C)] 96.4 °F (35.8 °C)  Pulse:  [] 96  Resp:  [16-18] 18  SpO2:  [95 %-100 %] 100 %  BP: (107-132)/(53-75) 107/72     Weight: 90.3 kg (199 lb 1.2 oz)  Height: 5' 7" (170.2 cm)  Body mass index is 31.18 kg/m².      Intake/Output Summary (Last 24 hours) at 7/14/2019 0710  Last data filed at 7/13/2019 1817  Gross per 24 hour   Intake 350 ml   Output 400 ml   Net -50 ml       Ortho/SPM Exam     AAOx4  NAD  Reg rate  No increased WOB     MSK:    Stage one right ischial ulcer without drainage or SOI    RLE:  Sensory/motor deficits 2/2 block  Compartments soft  TTP lower leg circumferentially   Dressing with 1x1cm area of drainage, no change  Leg elevated    Significant Labs:   CBC:   Recent Labs   Lab 07/12/19  0749 07/13/19  0442 07/14/19  0345   WBC 10.29 12.16 12.85*   HGB 9.1* 9.0* 9.7*   HCT 30.1* 29.5* 33.7*    212 235     CMP:   Recent Labs   Lab 07/12/19  0720 07/12/19  0749 07/13/19  0442 07/14/19  0345   * 133* 129* 131*   K 4.5 3.5 4.0 4.4   CL 96 98 95 95   CO2 20* 22* 18* 14*   * 108 91 117*   BUN 28* 14 18 26*   CREATININE 6.9* 3.8* 4.7* 5.8*   CALCIUM 10.5 10.0 10.7* 11.6*   PROT 7.0  --   --   --    ALBUMIN 2.2* 2.4* 2.3* 2.3*   BILITOT 0.3  --   --   --    ALKPHOS 96  --   --   --    AST 19  --   --   --    ALT <5*  --   --   --    ANIONGAP 13 13 16 22*   EGFRNONAA 6.4* 13.2* 10.2* 7.9*       Significant Imaging: I have reviewed all pertinent imaging results/findings.  "

## 2019-07-14 NOTE — PROGRESS NOTES
[facility] Hospital Medicine  Progress Note    Patient Name: Jenni Todd  MRN: [id number]  Patient Class: IP- Inpatient   Admission Date: 7/6/2019  Length of Stay: 3 days  Attending Physician: Fiordaliza Llamas MD  Primary Care Provider: Michael Tan Iii, MD    [facility] Medicine Team: [facility] HOSP MED 2 Kev Villegas MD    Subjective:     Principal Problem:Closed bicondylar fracture of right tibial plateau      HPI:  Patient is a 49 yo F with PMH of ESRD (on HD MWF), DM2, neuropathy, HTN, HLD, CAD, obesity, bulging discs in back who presented to the ED via ambulance following a fall. Patient was walking down the steps in front of her house and fell down and twisted her R leg. She described her leg pain as a 10/10. Last night, while at dialysis, her nurse could not aspirate her R femoral line, and she was instructed to come to the ER due to a clot in her line. She was on her way out of her house to come to the ER when she fell. At baseline, she uses a walker to get around due to her neuropathy in her feet and has a history of falls. In 2018, she fractured her hip and was operated on by Dr. Grullon.    She has had an extensive history for recurrent vascular access problems. She was on peritoneal dialysis for 14 years prior to February 2019, at which point she developed peritonitis, was admitted, and had her catheter removed and switched to HD. 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 Her last dialysis was on Wednesday, 07/03. She plans for a peritoneal dialysis catheter placement on July 25th with Dr. Tucker at [facility].    In the ED, XRs of her pelvis, knee, femur, tibia, and ankle of her R leg were ordered. R tibia XR revealed a tibial fracture and signs of osteopenia. Ortho and Nephrology were consulted. Her ASA and plavix were held, she was splinted, and Ortho planned for an ex-fix today, which  was cleared by Nephrology.           Overview/Hospital Course:  Patient was admitted to the floor on 07/06 with a R tibial fracture and a clotted R femoral access line. Orthopedic Surgery and Nephrology were consulted from the ED. Ortho operated on the patient on 07/06 and did an ex-fix with plans for definitive correction surgery at a later date. On 07/07, Nephrology flushed alteplase into her access line, and she is scheduled for dialysis treatment. Her pain is well-controlled with Tylenol and Oxycodone with Dilaudid available for breakthrough pain. Her home medications were resumed upon admission, except her midodrine was held prior to surgery due to concerns for falling BPs.  On 07/08, patient underwent HD and 1L of fluid was removed. On 07/09, Ortho evaluated the swelling of her R knee, and decided to not operate. On 07/10, patient went to HD at which 1.5L of fluid was removed. Her BP dropped to 81/56 following dialysis, and a dose of midodrine 10 mg was ordered. 07/11, patient underwent definitive surgery with Ortho. She tolerated the procedure well and her pain is well-controlled with a ropivacaine nerve block as well as Oxycodone PRN. On 07/12, patient underwent hemodialysis at which 2L was removed over 3.5 hours, and she tolerated it well. PT and OT were consulted for post-operative evaluation, however, not yet worked with patient due to pain. Patient endorses inability to move Rt foot toes and ankle with intact pulses and sensation. Will pause nerve block and assess her motor function. Will consider resuming DAPT after deemed safe from post procedure stand point.      Review of Systems   Constitutional: Negative for chills and fever.   HENT: Negative for congestion, rhinorrhea and sore throat.    Eyes: Negative for photophobia, discharge and visual disturbance.   Respiratory: Negative for shortness of breath.    Cardiovascular: Negative for chest pain and leg swelling.   Gastrointestinal: Positive for  abdominal distention. Negative for abdominal pain.   Genitourinary: Negative for difficulty urinating, dysuria and hematuria.   Musculoskeletal: Positive for joint swelling.        Diffuse R leg pain   Skin: Negative for rash and wound.   Neurological: Positive for weakness (Rt foot, ankle and toes ). Negative for dizziness, tremors, numbness and headaches.   Psychiatric/Behavioral: Negative for agitation, confusion and decreased concentration. The patient is not nervous/anxious.      Objective:     Vital Signs (Most Recent):  Temp: 96.5 °F (35.8 °C) (07/14/19 0739)  Pulse: 103 (07/14/19 1111)  Resp: 15 (07/14/19 0739)  BP: 100/64 (07/14/19 0739)  SpO2: 98 % (07/14/19 0739) Vital Signs (24h Range):  Temp:  [96.4 °F (35.8 °C)-98.1 °F (36.7 °C)] 96.5 °F (35.8 °C)  Pulse:  [] 103  Resp:  [15-18] 15  SpO2:  [95 %-100 %] 98 %  BP: (100-132)/(60-75) 100/64     Weight: 90.3 kg (199 lb 1.2 oz)  Body mass index is 31.18 kg/m².    Intake/Output Summary (Last 24 hours) at 7/14/2019 1220  Last data filed at 7/13/2019 1817  Gross per 24 hour   Intake 200 ml   Output 400 ml   Net -200 ml      Physical Exam   Constitutional: She is oriented to person, place, and time. She appears well-developed and well-nourished. No distress.   HENT:   Head: Normocephalic and atraumatic.   Eyes: Pupils are equal, round, and reactive to light. Conjunctivae and EOM are normal. Right eye exhibits no discharge. Left eye exhibits no discharge.   Neck: Normal range of motion. Neck supple. No JVD present. No thyromegaly present.   Cardiovascular: Normal rate, regular rhythm and normal heart sounds.   No murmur heard.  Pulmonary/Chest: Effort normal and breath sounds normal. No respiratory distress.   Abdominal: Soft. Bowel sounds are normal. She exhibits distension. There is no tenderness. There is no rebound.   Musculoskeletal: She exhibits no edema.   Ex-fix removed. Bandages in place. Clean, dry, and intact  Distal pulses intact bilaterally    Neurological: She is alert and oriented to person, place, and time. No sensory deficit.   Weakness affecting Rt foot, ankle and toes flection, extension, adduction and abduction    Skin: Skin is warm and dry. She is not diaphoretic.   Psychiatric: She has a normal mood and affect.       Significant Labs: All pertinent labs within the past 24 hours have been reviewed.    Significant Imaging: I have reviewed all pertinent imaging results/findings within the past 24 hours.      Assessment/Plan:      * Closed bicondylar fracture of right tibial plateau - s/p ORIF 7/11/19  -07/06, XR showing tibial fracture  -Ortho consulted and following  -PT/OT consulted and following  -Ortho did ex-fix on 07/06 with plans for definitive surgery at a later date  -07/11, underwent definitive surgery with Ortho 07/11  -Pain control via ropivacaine nerve block and oxycodone 5, 10, 15 PRNs  - 7/13: Patient endorses inability to move Rt foot toes and ankle with intact pulses and sensation. Will pause nerve block and assess her motor function.    ESRD (end stage renal disease) on dialysis  -reports a clot in vascular access line noticed at dialysis on 07/05  -Neprhology consulted and following  -strict Is/Os, daily weights, renal diet  -Neprhology flushed line with alteplase on 07/07  -HD on 07/08. Went well with 1L fluid removal. Needed Midodrine once for low BP.  -HD on 07/10. Went well with 1.5L fluid removal. Received Midodrine once for low BP.  -HD on 07/12. Went well with 2.5L fluid removal in 3 hours  -07/12, now on Midodrine 10 mg daily  -renal vitamins    At risk for venous thromboembolism (VTE)  -heparin 5k sq q8      Bulging discs - symptomatic   -gabapentin 300 BID      CAD (coronary artery disease)  -holding home ASA and plavix   Will resume once deemed safe from post procedure bleeding stand point       Hyperlipidemia  -restart home lipitor 40      Anemia in chronic kidney disease, on chronic dialysis  -Iron 325 mg  BID      Diabetic neuropathy associated with type 2 diabetes mellitus  -History of falls 2/2 lower extremity neuropathy.  -Patient fell while walking down the steps outside of her house  -Resume home gabapentin 300 BID      VTE Risk Mitigation (From admission, onward)        Ordered     heparin (porcine) injection 5,000 Units  Every 8 hours      07/12/19 0754     heparin (porcine) injection 5,000 Units  Every 8 hours      07/09/19 1803     heparin (porcine) injection 5,000 Units  Every 8 hours      07/08/19 1415     heparin (porcine) injection 1,000 Units  As needed (PRN)      07/08/19 1210     Place sequential compression device  Until discontinued      07/06/19 1158                Kev Villegas MD  Department of Hospital Medicine   [facility]

## 2019-07-15 NOTE — PROGRESS NOTES
[facility] HEMODIALYSIS NOTE     Patient currently on hemodialysis for removal of uremic toxins and volume.     Patient seen and evaluated on hemodialysis, tolerating treatment, see HD flowsheet for vitals and assessments.      Ultrafiltration goal is 1L as tolerated (patient was unable to be weighed), keep map >65     Labs have been reviewed and the dialysate bath has been adjusted.     Assessment/Plan:    -Patient seen on HD, tolerating treatment, w/o complaints   -Episodes of hypotension in HD (see flowsheets), midodrine received, will continue to monitor and consider a 2nd dose of midodrine, will drop dialysate temp   -Renal diet  -Strict I/O's and daily weights  -Daily renal function panels  -Hbg 8.6, continue Epo with HD  -Phos 4.8, continue renvela with meals  -Will continue to follow while inpatient       BERNICE Polanco, AGNP-C  Nephrology  Pager:  [phone]

## 2019-07-15 NOTE — PT/OT/SLP PROGRESS
Occupational Therapy   Treatment    Name: Jenni Todd  MRN: [id number]  Admitting Diagnosis:  Closed bicondylar fracture of right tibial plateau  4 Days Post-Op    Recommendations:     Discharge Recommendations: nursing facility, skilled  Discharge Equipment Recommendations:  none  Barriers to discharge:  Inaccessible home environment, Decreased caregiver support    Assessment:     Jenni Todd is a 48 y.o. female with a medical diagnosis of Closed bicondylar fracture of right tibial plateau. Performance deficits affecting function are weakness, impaired endurance, impaired self care skills, impaired functional mobilty, decreased upper extremity function, pain, decreased safety awareness. Pt. With increased fatigue/weakness during therapy session today. Pt. Required Total A x 2 for supine<sit>supine. Pt. Required Max A for upright posture while seated EOB and required UE support to maintain seated balance while EOB.     Rehab Prognosis:  Good; patient would benefit from acute skilled OT services to address these deficits and reach maximum level of function.       Plan:     Patient to be seen 3 x/week to address the above listed problems via self-care/home management, therapeutic activities, therapeutic exercises  · Plan of Care Expires: 08/15/19  · Plan of Care Reviewed with: patient    Subjective     Pain/Comfort:  · Pain Rating 1: (none stated)    Objective:     Communicated with: RN prior to session.  Patient found supine with FCD, peripheral IV, telemetry upon OT entry to room.    General Precautions: Standard, fall   Orthopedic Precautions:RLE non weight bearing   Braces:       Occupational Performance:     Bed Mobility:    · Patient completed Supine to Sit with total assistance x2  · Patient completed Sit to Supine with total assistance x2    · Functional Mobility/Transfers:  · Functional Mobility: Pt. Required Total A x2 to transition from supine<> sit and from sit<>supine. Pt. Sat EOB ~13 minutes  with Mod-Max Assistance using UE for seated balance support.    Activities of Daily Living:  · Grooming: moderate assistance for seated balance. Pt. able to use wipes and apply deodorant with setup.      [facility] [address] 6 Click ADL: 12    Treatment & Education:  - OT/POC-  - Importance of mobility to maximize recovery.  - Pt. Used wipes for UB and apply deodorant with setup. UE for seated balance support while performing grooming task.         Patient left supine with all lines intact, call button in reach and RN notifiedEducation:      GOALS:   Multidisciplinary Problems     Occupational Therapy Goals        Problem: Occupational Therapy Goal    Goal Priority Disciplines Outcome Interventions   Occupational Therapy Goal     OT, PT/OT Ongoing (interventions implemented as appropriate)    Description:  Goals to be met by: 8/9/2019    Patient will increase functional independence with ADLs by performing:    UE Dressing with Set-up Assistance.  LE Dressing with Stand-by Assistance.  Toileting from bedside commode with Supervision for hygiene and clothing management.   Supine to sit with Supervision.    Maria Alejandra Raymond OT  7/9/2019                        Time Tracking:     OT Date of Treatment: 07/15/19  OT Start Time: 1437  OT Stop Time: 1508  OT Total Time (min): 31 min    Billable Minutes:Self Care/Home Management 15  Therapeutic Activity 16    Maria Alejandra Raymond OT  7/15/2019

## 2019-07-15 NOTE — SUBJECTIVE & OBJECTIVE
Review of Systems   Constitutional: Negative for chills and fever.   HENT: Negative for sore throat.    Respiratory: Negative for shortness of breath.    Cardiovascular: Negative for chest pain and leg swelling.   Gastrointestinal: Positive for abdominal distention. Negative for abdominal pain.   Genitourinary: Negative for dysuria.   Musculoskeletal: Positive for joint swelling.        Diffuse R leg pain   Skin: Negative for rash and wound.   Neurological: Positive for weakness (Rt foot, ankle and toes ). Negative for dizziness, tremors, numbness and headaches.   Psychiatric/Behavioral: Negative for agitation, confusion and decreased concentration. The patient is not nervous/anxious.      Objective:     Vital Signs (Most Recent):  Temp: 97.6 °F (36.4 °C) (07/15/19 1423)  Pulse: 92 (07/15/19 1423)  Resp: 16 (07/15/19 1423)  BP: (!) 114/59 (07/15/19 1423)  SpO2: 98 % (07/15/19 0503) Vital Signs (24h Range):  Temp:  [97.6 °F (36.4 °C)-99.6 °F (37.6 °C)] 97.6 °F (36.4 °C)  Pulse:  [] 92  Resp:  [14-18] 16  SpO2:  [96 %-100 %] 98 %  BP: ()/(44-67) 114/59     Weight: 90.3 kg (199 lb 1.2 oz)  Body mass index is 31.18 kg/m².    Intake/Output Summary (Last 24 hours) at 7/15/2019 1424  Last data filed at 7/15/2019 0930  Gross per 24 hour   Intake 700 ml   Output 167 ml   Net 533 ml      Physical Exam   Constitutional: She is oriented to person, place, and time. She appears well-developed and well-nourished. No distress.   HENT:   Head: Normocephalic and atraumatic.   Eyes: Pupils are equal, round, and reactive to light. Conjunctivae and EOM are normal. Right eye exhibits no discharge. Left eye exhibits no discharge.   Neck: Normal range of motion. Neck supple.   Cardiovascular: Normal rate, regular rhythm and normal heart sounds.   No murmur heard.  Pulmonary/Chest: Effort normal and breath sounds normal. No respiratory distress.   Abdominal: Soft. Bowel sounds are normal. She exhibits distension. There is no  tenderness.   Musculoskeletal: She exhibits no edema.   Ex-fix removed. Bandages in place. Clean, dry, and intact  Distal pulses intact bilaterally   Neurological: She is alert and oriented to person, place, and time.   Weakness affecting Rt foot, ankle and toes flection, extension, adduction and abduction    Skin: Skin is warm and dry. She is not diaphoretic.   Psychiatric: She has a normal mood and affect.       Significant Labs: All pertinent labs within the past 24 hours have been reviewed.    Significant Imaging: I have reviewed all pertinent imaging results/findings within the past 24 hours.

## 2019-07-15 NOTE — PROGRESS NOTES
[facility] Hospital Medicine  Progress Note    Patient Name: Jenni Todd  MRN: [id number]  Patient Class: IP- Inpatient   Admission Date: 7/6/2019  Length of Stay: 4 days  Attending Physician: Fiordaliza Llamas MD  Primary Care Provider: Michael Tan Iii, MD    [facility] Medicine Team: [facility] HOSP MED 2 Kev Villegas MD    Subjective:     Principal Problem:Closed bicondylar fracture of right tibial plateau      HPI:  Patient is a 47 yo F with PMH of ESRD (on HD MWF), DM2, neuropathy, HTN, HLD, CAD, obesity, bulging discs in back who presented to the ED via ambulance following a fall. Patient was walking down the steps in front of her house and fell down and twisted her R leg. She described her leg pain as a 10/10. Last night, while at dialysis, her nurse could not aspirate her R femoral line, and she was instructed to come to the ER due to a clot in her line. She was on her way out of her house to come to the ER when she fell. At baseline, she uses a walker to get around due to her neuropathy in her feet and has a history of falls. In 2018, she fractured her hip and was operated on by Dr. Grullon.    She has had an extensive history for recurrent vascular access problems. She was on peritoneal dialysis for 14 years prior to February 2019, at which point she developed peritonitis, was admitted, and had her catheter removed and switched to HD. 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 Her last dialysis was on Wednesday, 07/03. She plans for a peritoneal dialysis catheter placement on July 25th with Dr. Tucker at [facility].    In the ED, XRs of her pelvis, knee, femur, tibia, and ankle of her R leg were ordered. R tibia XR revealed a tibial fracture and signs of osteopenia. Ortho and Nephrology were consulted. Her ASA and plavix were held, she was splinted, and Ortho planned for an ex-fix today, which  was cleared by Nephrology.         Overview/Hospital Course:  Patient was admitted to the floor on 07/06 with a R tibial fracture and a clotted R femoral access line. Orthopedic Surgery and Nephrology were consulted from the ED. Ortho operated on the patient on 07/06 and did an ex-fix with plans for definitive correction surgery at a later date. On 07/07, Nephrology flushed alteplase into her access line, and she is scheduled for dialysis treatment. Her pain is well-controlled with Tylenol and Oxycodone with Dilaudid available for breakthrough pain. Her home medications were resumed upon admission, except her midodrine was held prior to surgery due to concerns for falling BPs. On 07/08, patient underwent HD and 1L of fluid was removed. On 07/09, Ortho evaluated the swelling of her R knee, and decided to not operate. On 07/10, patient went to HD at which 1.5L of fluid was removed. Her BP dropped to 81/56 following dialysis, and a dose of midodrine 10 mg was ordered. 07/11, patient underwent definitive surgery with Ortho. She tolerated the procedure well and her pain is well-controlled with a ropivacaine nerve block as well as Oxycodone PRN. On 07/12, patient underwent hemodialysis at which 2L was removed over 3.5 hours, and she tolerated it well. PT and OT were consulted for post-operative evaluation.  Patient endorsed inability to move her right toes and ankle with intact pulses and sensation. Anesthesia was informed, and her Ropivacaine nerve block was temporarily discontinued. Her right toes and ankle were reassesed following discontinuing her nerve block, and she was noted to have improved motor and sensory function. Anesthesia resumed her nerve block following examination. On 07/15, patient underwent dialysis, and did not tolerate it well. Only 0.5 L were pulled off out of the planned 1 L due to the patient developing symptomatic hypotension. A CXR was obtained which showed no acute processes.      Review of  Systems   Constitutional: Negative for chills and fever.   HENT: Negative for sore throat.    Respiratory: Negative for shortness of breath.    Cardiovascular: Negative for chest pain and leg swelling.   Gastrointestinal: Positive for abdominal distention. Negative for abdominal pain.   Genitourinary: Negative for dysuria.   Musculoskeletal: Positive for joint swelling.        Diffuse R leg pain   Skin: Negative for rash and wound.   Neurological: Positive for weakness (Rt foot, ankle and toes ). Negative for dizziness, tremors, numbness and headaches.   Psychiatric/Behavioral: Negative for agitation, confusion and decreased concentration. The patient is not nervous/anxious.      Objective:     Vital Signs (Most Recent):  Temp: 97.6 °F (36.4 °C) (07/15/19 1423)  Pulse: 92 (07/15/19 1423)  Resp: 16 (07/15/19 1423)  BP: (!) 114/59 (07/15/19 1423)  SpO2: 98 % (07/15/19 0503) Vital Signs (24h Range):  Temp:  [97.6 °F (36.4 °C)-99.6 °F (37.6 °C)] 97.6 °F (36.4 °C)  Pulse:  [] 92  Resp:  [14-18] 16  SpO2:  [96 %-100 %] 98 %  BP: ()/(44-67) 114/59     Weight: 90.3 kg (199 lb 1.2 oz)  Body mass index is 31.18 kg/m².    Intake/Output Summary (Last 24 hours) at 7/15/2019 1424  Last data filed at 7/15/2019 0930  Gross per 24 hour   Intake 700 ml   Output 167 ml   Net 533 ml      Physical Exam   Constitutional: She is oriented to person, place, and time. She appears well-developed and well-nourished. No distress.   HENT:   Head: Normocephalic and atraumatic.   Eyes: Pupils are equal, round, and reactive to light. Conjunctivae and EOM are normal. Right eye exhibits no discharge. Left eye exhibits no discharge.   Neck: Normal range of motion. Neck supple.   Cardiovascular: Normal rate, regular rhythm and normal heart sounds.   No murmur heard.  Pulmonary/Chest: Effort normal and breath sounds normal. No respiratory distress.   Abdominal: Soft. Bowel sounds are normal. She exhibits distension. There is no tenderness.    Musculoskeletal: She exhibits no edema.   Ex-fix removed. Bandages in place. Clean, dry, and intact  Distal pulses intact bilaterally   Neurological: She is alert and oriented to person, place, and time.   Weakness affecting Rt foot, ankle and toes flection, extension, adduction and abduction    Skin: Skin is warm and dry. She is not diaphoretic.   Psychiatric: She has a normal mood and affect.       Significant Labs: All pertinent labs within the past 24 hours have been reviewed.    Significant Imaging: I have reviewed all pertinent imaging results/findings within the past 24 hours.      Assessment/Plan:      * Closed bicondylar fracture of right tibial plateau - s/p ORIF 7/11/19  -07/06, XR showing tibial fracture  -Ortho consulted and following  -PT/OT consulted and following  -Ortho did ex-fix on 07/06 with plans for definitive surgery at a later date  -07/11, underwent definitive surgery with Ortho 07/11  -Pain control via ropivacaine nerve block and oxycodone 5, 10, 15 PRNs  -07/13: Patient endorses inability to move Rt foot toes and ankle with intact pulses and sensation  -07/14, nerve block was paused, and motor and sensory function were noted to be improved on exam. Anesthesia resumed nerve block after examination.    ESRD (end stage renal disease) on dialysis  -reports a clot in vascular access line noticed at dialysis on 07/05  -Neprhology consulted and following  -strict Is/Os, daily weights, renal diet  -Neprhology flushed line with alteplase on 07/07  -HD on 07/08. Went well with 1L fluid removal. Needed Midodrine once for low BP.  -HD on 07/10. Went well with 1.5L fluid removal. Received Midodrine once for low BP.  -HD on 07/12. Went well with 2.5L fluid removal in 3 hours  -07/15, patient did not tolerate HD well, developed symptomatic hypotension, and HD was stopped after 0.5L of the planned 1L dialysis. CXR was obtained which showed no acute processes.   -Midodrine changed to 10 mg TID damian  with 10 mg PRN for systolic <90, and 20 mg PRN 30 minutes before HD  -renal vitamins    At risk for venous thromboembolism (VTE)  -heparin 5k sq q8      Bulging discs - symptomatic   -gabapentin 300 BID      CAD (coronary artery disease)  -holding home ASA and plavix   Will resume once deemed safe from post procedure bleeding stand point       Hyperlipidemia  -restart home lipitor 40      Anemia in chronic kidney disease, on chronic dialysis  -Iron 325 mg BID      Diabetic neuropathy associated with type 2 diabetes mellitus  -History of falls 2/2 lower extremity neuropathy.  -Patient fell while walking down the steps outside of her house  -Resume home gabapentin 300 BID        VTE Risk Mitigation (From admission, onward)        Ordered     heparin (porcine) injection 5,000 Units  Every 8 hours      07/12/19 0754     heparin (porcine) injection 5,000 Units  Every 8 hours      07/09/19 1803     heparin (porcine) injection 5,000 Units  Every 8 hours      07/08/19 1415     heparin (porcine) injection 1,000 Units  As needed (PRN)      07/08/19 1210     Place sequential compression device  Until discontinued      07/06/19 1159                Kev Villegas MD  Department of Hospital Medicine   [facility]

## 2019-07-15 NOTE — PROGRESS NOTES
2 hours of 4 hour dialysis complete.  Blood returned early due to continued hypotension.  Right femoral catheter flushed with normal saline, both lumens filled with heparin, capped and taped.  Net positive 0.5.  Procrit given.  Transported from dialysis unit to room 503 A via bed by 2 transporters.

## 2019-07-15 NOTE — PT/OT/SLP PROGRESS
Physical Therapy Treatment    Patient Name:  Jenni Todd   MRN:  [id number]    Recommendations:     Discharge Recommendations:  nursing facility, skilled   Discharge Equipment Recommendations: none   Barriers to discharge: requires increased assistance for functional mobility     Assessment:     Jenni Todd is a 48 y.o. female admitted with a medical diagnosis of Closed bicondylar fracture of right tibial plateau.  She presents with the following impairments/functional limitations:  weakness, impaired functional mobilty, impaired balance, decreased lower extremity function, decreased ROM, impaired skin, pain, orthopedic precautions. Patient total A for bed mobility. She tolerated sitting on edge of bed ~13 minutes with CGA. She would continue to benefit from PT in order to get back to PLOF.     Rehab Prognosis: Fair; patient would benefit from acute skilled PT services to address these deficits and reach maximum level of function.    Recent Surgery: Procedure(s) (LRB):  ORIF, FRACTURE, TIBIA, PLATEAU (Right)  REMOVAL, EXTERNAL FIXATION DEVICE (Right) 4 Days Post-Op    Plan:     During this hospitalization, patient to be seen 5 x/week to address the identified rehab impairments via therapeutic activities, therapeutic exercises and progress toward the following goals:    · Plan of Care Expires:  08/12/19    Subjective     Chief Complaint: Pain in right leg.   Patient/Family Comments/goals: To get back to PLOF.  Pain/Comfort:  · Pain Rating 1: (did not rate)  · Location - Side 1: Right  · Location - Orientation 1: generalized  · Location 1: leg  · Pain Addressed 1: Reposition, Distraction, Cessation of Activity      Objective:     Communicated with RN prior to session.  Patient found supine with (right femoral hemodialysis catheter) upon PT entry to room.     General Precautions: Standard, fall   Orthopedic Precautions:RLE non weight bearing   Braces: N/A     Functional Mobility:  · Bed Mobility:      · Scooting: total assistance  · Supine to Sit: total assistance  · Sit to Supine: total assistance      AM-PAC 6 CLICK MOBILITY  Turning over in bed (including adjusting bedclothes, sheets and blankets)?: 2  Sitting down on and standing up from a chair with arms (e.g., wheelchair, bedside commode, etc.): 1  Moving from lying on back to sitting on the side of the bed?: 2  Moving to and from a bed to a chair (including a wheelchair)?: 1  Need to walk in hospital room?: 1  Climbing 3-5 steps with a railing?: 1  Basic Mobility Total Score: 8       Therapeutic Activities and Exercises:   Patient tolerated sitting on edge of bed ~13 minutes ith CGA. No loss of balance noted. Patient with c/o that she was unable to hold her head while seated on edge of bed.     Patient left supine with all lines intact, call button in reach and RN notified..    GOALS:   Multidisciplinary Problems     Physical Therapy Goals        Problem: Physical Therapy Goal    Goal Priority Disciplines Outcome Goal Variances Interventions   Physical Therapy Goal     PT, PT/OT Ongoing (interventions implemented as appropriate)     Description:  Goals to be met by: 19    Patient will increase functional independence with mobility by performin. Supine to sit with Moderate Assistance -not met  2. Sit to supine with Moderate Assistance -not met  3. Sit to stand transfer with Moderate Assistance with RW -not met  4. Bed to chair transfer with Minimal Assistance using Slideboard with NWB R LE -not met  5. Pt to propel w/c 50ft with B UE on level surface with CGA-not met                       Time Tracking:     PT Received On: 07/15/19  PT Start Time: 1447     PT Stop Time: 1510  PT Total Time (min): 23 min     Billable Minutes: Therapeutic Activity 23    Treatment Type: Treatment  PT/PTA: PT     PTA Visit Number: 0     Amy Agarwal, PT  07/15/2019

## 2019-07-15 NOTE — PLAN OF CARE
Problem: Physical Therapy Goal  Goal: Physical Therapy Goal  Goals to be met by: 19    Patient will increase functional independence with mobility by performin. Supine to sit with Moderate Assistance -not met  2. Sit to supine with Moderate Assistance -not met  3. Sit to stand transfer with Moderate Assistance with RW -not met  4. Bed to chair transfer with Minimal Assistance using Slideboard with NWB R LE -not met  5. Pt to propel w/c 50ft with B UE on level surface with CGA-not met        Cont POC

## 2019-07-15 NOTE — ASSESSMENT & PLAN NOTE
48 y.o. female POD5 s/p R tibial plateau ORIF    Pain control: multimodal  PT/OT: NWB RLE  DVT PPx: lovenox, FCDs at all times when not ambulating  Abx: postop Ancef complete  Labs: reviewed  Drain: none  Wiggins: none    Dispo: SNF referrals sent

## 2019-07-15 NOTE — SUBJECTIVE & OBJECTIVE
Principal Problem:Closed bicondylar fracture of right tibial plateau    Principal Orthopedic Problem: same    Interval History: Patient seen and examined at bedside. NAEON.  Pain controlled.  Bed mobility with PT yesterday.    Review of patient's allergies indicates:   Allergen Reactions    Flagyl [metronidazole hcl] Nausea And Vomiting    Clindamycin Diarrhea    Metronidazole        Current Facility-Administered Medications   Medication    0.9%  NaCl infusion (for blood administration)    0.9%  NaCl infusion    0.9%  NaCl infusion    acetaminophen tablet 650 mg    atorvastatin tablet 40 mg    bisacodyl suppository 10 mg    dextrose 10% (D10W) Bolus    dextrose 10% (D10W) Bolus    epoetin adonay-epbx injection 5,800 Units    famotidine tablet 20 mg    ferrous sulfate EC tablet 325 mg    gabapentin capsule 300 mg    glucagon (human recombinant) injection 1 mg    glucose chewable tablet 16 g    glucose chewable tablet 24 g    heparin (porcine) injection 1,000 Units    heparin (porcine) injection 5,000 Units    HYDROmorphone injection 0.2 mg    insulin aspart U-100 pen 0-5 Units    midodrine tablet 10 mg    ondansetron injection 4 mg    oxyCODONE immediate release tablet 10 mg    oxyCODONE immediate release tablet 15 mg    oxyCODONE immediate release tablet 5 mg    polyethylene glycol packet 17 g    promethazine (PHENERGAN) 6.25 mg in dextrose 5 % 50 mL IVPB    ropivacaine (PF) 2 mg/ml (0.2%) infusion    senna tablet 8.6 mg    sevelamer carbonate tablet 800 mg    sodium chloride 0.9% flush 10 mL    vitamin renal formula (B-complex-vitamin c-folic acid) 1 mg per capsule 1 capsule     Objective:     Vital Signs (Most Recent):  Temp: 99.6 °F (37.6 °C) (07/15/19 0503)  Pulse: 108 (07/15/19 0503)  Resp: 14 (07/15/19 0503)  BP: (!) 122/58 (07/15/19 0503)  SpO2: 98 % (07/15/19 0503) Vital Signs (24h Range):  Temp:  [96.5 °F (35.8 °C)-99.6 °F (37.6 °C)] 99.6 °F (37.6 °C)  Pulse:  []  "108  Resp:  [14-16] 14  SpO2:  [96 %-100 %] 98 %  BP: ()/(55-67) 122/58     Weight: 90.3 kg (199 lb 1.2 oz)  Height: 5' 7" (170.2 cm)  Body mass index is 31.18 kg/m².        Ortho/SPM Exam     AAOx4  NAD  Reg rate  No increased WOB     MSK:    Stage one right ischial ulcer without drainage or SOI    RLE:  Sensation decreased but present T/SP/DP  Some motor function T; No dorsiflexion toe/ankle  Compartments soft  TTP lower leg circumferentially   Dressing with 1x1cm area of drainage, no change  Leg elevated    Significant Labs:   CBC:   Recent Labs   Lab 07/14/19  0345   WBC 12.85*   HGB 9.7*   HCT 33.7*        CMP:   Recent Labs   Lab 07/14/19  0345   *   K 4.4   CL 95   CO2 14*   *   BUN 26*   CREATININE 5.8*   CALCIUM 11.6*   ALBUMIN 2.3*   ANIONGAP 22*   EGFRNONAA 7.9*       Significant Imaging: I have reviewed all pertinent imaging results/findings.  "

## 2019-07-15 NOTE — PLAN OF CARE
07/15/19 1405   Discharge Reassessment   Assessment Type Discharge Planning Reassessment   Provided patient/caregiver education on the expected discharge date and the discharge plan Yes   Do you have any problems affording any of your prescribed medications? No   Discharge Plan A Skilled Nursing Facility   Discharge Plan B Rehab   DME Needed Upon Discharge  none   Patient choice form signed by patient/caregiver Yes   Anticipated Discharge Disposition SNF   Can the patient answer the patient profile reliably? Yes, cognitively intact   How does the patient rate their overall health at the present time? Fair   Describe the patient's ability to walk at the present time. Major restrictions/daily assistance from another person   How often would a person be available to care for the patient? Occasionally   Number of comorbid conditions (as recorded on the chart) Four   During the past month, has the patient often been bothered by feeling down, depressed or hopeless? No   During the past month, has the patient often been bothered by little interest or pleasure in doing things? No   Post-Acute Status   Post-Acute Authorization Placement   Post-Acute Placement Status Referrals Sent

## 2019-07-15 NOTE — PLAN OF CARE
Problem: Occupational Therapy Goal  Goal: Occupational Therapy Goal  Goals to be met by: 8/9/2019    Patient will increase functional independence with ADLs by performing:    UE Dressing with Set-up Assistance.  LE Dressing with Stand-by Assistance.  Toileting from bedside commode with Supervision for hygiene and clothing management.   Supine to sit with Supervision.    Maria Alejandra Raymond OT  7/9/2019       Outcome: Ongoing (interventions implemented as appropriate)  Pt. With increased fatigue/weakness and needing Total A from supine to sit. Continue OT POC.    Maria Alejandra Raymond OT  7/15/2019

## 2019-07-15 NOTE — PT/OT/SLP PROGRESS
Physical Therapy      Patient Name:  Jenni Todd   MRN:  [id number]    Patient not seen today secondary to Dialysis. Will follow-up again at next scheduled visit.    Amy Agarwal, PT

## 2019-07-15 NOTE — ASSESSMENT & PLAN NOTE
-reports a clot in vascular access line noticed at dialysis on 07/05  -Neprhology consulted and following  -strict Is/Os, daily weights, renal diet  -Neprhology flushed line with alteplase on 07/07  -HD on 07/08. Went well with 1L fluid removal. Needed Midodrine once for low BP.  -HD on 07/10. Went well with 1.5L fluid removal. Received Midodrine once for low BP.  -HD on 07/12. Went well with 2.5L fluid removal in 3 hours  -07/15, patient did not tolerate HD well, developed symptomatic hypotension, and HD was stopped after 0.5L of the planned 1L dialysis. CXR was obtained which showed no acute processes.   -Midodrine changed to 10 mg TID damian, with 10 mg PRN for systolic <90, and 20 mg PRN 30 minutes before HD  -renal vitamins

## 2019-07-15 NOTE — PROGRESS NOTES
Patient continued to be hypotensive in HD and was symptomatic.Unable to tolerate UF. Will stop HD today and monitor for s/s of volume overload. No emergent need for clearance at this time. She also c/o SOB, placed on 4L NC. Normally receives 20mg midodrine outpatient, will increase dosage. Ordered ABG and x-ray. Consider transfer to ICU for SLED/SCUF, if s/s volume overload.      BENRICE Polanco, AGNP-C  Nephrology  Pager: [phone]

## 2019-07-15 NOTE — ASSESSMENT & PLAN NOTE
-07/06, XR showing tibial fracture  -Ortho consulted and following  -PT/OT consulted and following  -Ortho did ex-fix on 07/06 with plans for definitive surgery at a later date  -07/11, underwent definitive surgery with Ortho 07/11  -Pain control via ropivacaine nerve block and oxycodone 5, 10, 15 PRNs  -07/13: Patient endorses inability to move Rt foot toes and ankle with intact pulses and sensation  -07/14, nerve block was paused, and motor and sensory function were noted to be improved on exam. Anesthesia resumed nerve block after examination.

## 2019-07-16 NOTE — PROGRESS NOTES
[facility]     Patient currently on hemodialysis for removal of uremic toxins and volume.     Patient seen and evaluated on hemodialysis, tolerating treatment, see HD flowsheet for vitals and assessments.      Ultrafiltration goal is 0.5-1L as tolerated (pt unable to weigh today), keep map >65     Labs have been reviewed and the dialysate bath has been adjusted.     Assessment/Plan:    -Patient seen on HD, tolerating treatment well, w/o complaints   -Renal diet  -Strict I/O's and daily weights  -Daily renal function panels  -Albumin 2.3, will order novasource renal supplements   -Hbg 7.9, continue Epo with HD   -Phos 4.8 continue renvela with meals   -Will continue to follow while inpatient       BERNICE Polanco, AGNP-C  Nephrology  Pager:  [phone]

## 2019-07-16 NOTE — PT/OT/SLP PROGRESS
Physical Therapy      Patient Name:  Jenni Todd   MRN:  [id number]    Patient not seen today secondary to Dialysis. Will follow-up again at next scheduled visit.    Amy Agarwal, PT

## 2019-07-16 NOTE — PROGRESS NOTES
Received pt via bed awake and alert. Dialysis started via right femoral HD catheter. HD days are MWF per pt.

## 2019-07-16 NOTE — PHYSICIAN QUERY
PT Name: Jenni Todd  MR #: [id number]     Physician Query Form - Cardiomyopathy Clarification     CDS/: Sita Wu RN   Contact information: [email or web address]  This form is a permanent document in the medical record.     Query Date: July 16, 2019    By submitting this query, we are merely seeking further clarification of documentation to reflect the severity of illness of your patient. Please utilize your independent clinical judgment when addressing the question(s) below.    The Medical record reflects the following:     Indicators   Supporting Clinical Findings Location in Medical Record   x Cardiomyopathy, NICM, CM or similar term documented cardiomyopathy H&P   x Acute/Chronic Illness Closed fracture of R tibial plateau  CAD, DM2, ESRD, HTN, FSGS, obesity, secondary hyperparathyroidism, anemia I CKD on HD H&P   x Echo, Radiology, and/or Heart Cath Findings 2D Echo:        Results for orders placed or performed during the hospital encounter of 11/03/16   2D echo with color flow doppler   Result Value Ref Range     QEF 60 55 - 65     Mitral Valve Regurgitation MILD       Diastolic Dysfunction No       Aortic Valve Stenosis TRIVIAL TO MILD      Anesthesia record 7/6    BiPAP/Intubation/Supplemental O2     x SOB, RETANA, Fatigue, Dizziness, LE Edema, Cyanosis, Chest Pain, Pulmonary HTN, Respiratory Distress, Hypoxia, etc. Pulmonary hypertension Anesthesia record 7/10   x Treatment                                 Medication Midodrine oral   Hydralazine injection MAR 7/16  MAR 7/6    Other       Provider, please specify the type of cardiomyopathy:    [   ] Hypertensive   [   ] Ischemic   [   ] Non-ischemic Dilated (congestive)    [   ] Non-ischemic Hypertrophic obstructive    [   ] Non-ischemic Hypertrophic non-obstructive   [   ] Non-ischemic Restrictive    [X   ] Cardiomyopathy, type unspecified or unknown   [   ] Cardiomyopathy Ruled Out   [   ] Other type of cardiomyopathy (please  specify):    [   ]  Clinically Undetermined       Please document in your progress notes daily for the duration of treatment until resolved, and include in your discharge summary.

## 2019-07-16 NOTE — ASSESSMENT & PLAN NOTE
-07/06, XR showing tibial fracture  -Ortho consulted and following  -PT/OT consulted and following  -07/06, Ortho did ex-fix  -07/11, underwent definitive surgery, ORIF, with Ortho  -07/13: Patient endorsed inability to move Rt foot toes and ankle with intact pulses and sensation  -07/14, nerve block was paused, and motor and sensory function were noted to be improved on exam. Anesthesia resumed nerve block after examination.  -07/16, nerve block discontinued. Pain control via Oxycodone PRNs, de-escalated to 5 and 10 mgs  -PT/OT recommended SNF placement

## 2019-07-16 NOTE — PLAN OF CARE
Problem: Adult Inpatient Plan of Care  Goal: Plan of Care Review  Outcome: Ongoing (interventions implemented as appropriate)  Dialysis comp;eted,right femoral permacath flushed with NS, heparinized,capped, taped and dressing changed using aseptic technique. Pt dialyzed for 3.5hrs with fluid removal of 500ml. Tolerated well with stable VS. Report given to CRISTOBAL Pineda.

## 2019-07-16 NOTE — PT/OT/SLP PROGRESS
Occupational Therapy      Patient Name:  Jenni Todd   MRN:  [id number]    Patient not seen today secondary to pt. Off the floor with dialysis. Will follow-up 7/17/19.    Maria Alejandar Raymond OT  7/16/2019

## 2019-07-16 NOTE — PROGRESS NOTES
[facility] Hospital Medicine  Progress Note    Patient Name: Jenni Todd  MRN: [id number]  Patient Class: IP- Inpatient   Admission Date: 7/6/2019  Length of Stay: 5 days  Attending Physician: Fiordaliza Llamas MD  Primary Care Provider: Michael Tan Iii, MD    [facility] Medicine Team: [facility] MED 2 Kev Villegas MD    Subjective:     Principal Problem:Closed bicondylar fracture of right tibial plateau      HPI:  Patient is a 49 yo F with PMH of ESRD (on HD MWF), DM2, neuropathy, HTN, HLD, CAD, obesity, bulging discs in back who presented to the ED via ambulance following a fall. Patient was walking down the steps in front of her house and fell down and twisted her R leg. She described her leg pain as a 10/10. Last night, while at dialysis, her nurse could not aspirate her R femoral line, and she was instructed to come to the ER due to a clot in her line. She was on her way out of her house to come to the ER when she fell. At baseline, she uses a walker to get around due to her neuropathy in her feet and has a history of falls. In 2018, she fractured her hip and was operated on by Dr. Grullon.    She has had an extensive history for recurrent vascular access problems. She was on peritoneal dialysis for 14 years prior to February 2019, at which point she developed peritonitis, was admitted, and had her catheter removed and switched to HD. 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 Her last dialysis was on Wednesday, 07/03. She plans for a peritoneal dialysis catheter placement on July 25th with Dr. Tucker at [facility].    In the ED, XRs of her pelvis, knee, femur, tibia, and ankle of her R leg were ordered. R tibia XR revealed a tibial fracture and signs of osteopenia. Ortho and Nephrology were consulted. Her ASA and plavix were held, she was splinted, and Ortho planned for an ex-fix today, which  was cleared by Nephrology.           Overview/Hospital Course:  Patient was admitted to the floor on 07/06 with a R tibial fracture and a clotted R femoral access line. Orthopedic Surgery and Nephrology were consulted from the ED. Ortho operated on the patient on 07/06 and did an ex-fix with plans for definitive correction surgery at a later date. On 07/07, Nephrology flushed alteplase into her access line, and she is scheduled for dialysis treatment. Her pain is well-controlled with Tylenol and Oxycodone with Dilaudid available for breakthrough pain. Her home medications were resumed upon admission, except her midodrine was held prior to surgery due to concerns for falling BPs. On 07/08, patient underwent HD and 1L of fluid was removed. On 07/09, Ortho evaluated the swelling of her R knee, and decided to not operate. On 07/10, patient went to HD at which 1.5L of fluid was removed. Her BP dropped to 81/56 following dialysis, and a dose of midodrine 10 mg was ordered. 07/11, patient underwent definitive surgery with Ortho. She tolerated the procedure well and her pain is well-controlled with a ropivacaine nerve block as well as Oxycodone PRN. On 07/12, patient underwent hemodialysis at which 2L was removed over 3.5 hours, and she tolerated it well. PT and OT were consulted for post-operative evaluation.  Patient endorsed inability to move her right toes and ankle with intact pulses and sensation. Anesthesia was informed, and her Ropivacaine nerve block was temporarily discontinued. Her right toes and ankle were reassesed following discontinuing her nerve block, and she was noted to have improved motor and sensory function. Anesthesia resumed her nerve block following examination. On 07/15, patient underwent dialysis, and did not tolerate it well. Only 0.5 L were pulled off out of the planned 1 L due to the patient developing symptomatic hypotension. A CXR was obtained which showed no acute processes. On 07/16, her  blood pressures still remained low so her Midodrine was changed to 15 mg TID, with 10 mg Midodrine PRN to if systolic blood pressure <90. Also, midodrine 20 mg PRN 30 minutes prior to HD was ordered. Her nerve block was discontinued indefinitely and her motor and sensory function of her RLE improved. OT and PT worked with her and recommended SNF placement upon discharge. She underwent dialysis for 3.5h and had 0.5L removed with stable vital signs throughout. Patient refused heparin 5k sq for prophylactic VTE - she was counseled extensively about its risks and benefits, however, has continued to refuse throughout her admission      Review of Systems   Constitutional: Negative for chills and fever.   HENT: Negative for sore throat.    Respiratory: Negative for shortness of breath and wheezing.    Cardiovascular: Positive for leg swelling. Negative for chest pain.   Gastrointestinal: Positive for abdominal distention. Negative for abdominal pain.   Genitourinary: Negative for decreased urine volume.   Musculoskeletal: Positive for joint swelling.   Neurological: Negative for dizziness and light-headedness.   Hematological: Negative for adenopathy. Does not bruise/bleed easily.     Objective:     Vital Signs (Most Recent):  Temp: 97.7 °F (36.5 °C) (07/16/19 0458)  Pulse: 99 (07/16/19 0458)  Resp: 16 (07/16/19 0458)  BP: (!) 103/52 (07/16/19 0500)  SpO2: (!) 94 % (07/16/19 0458) Vital Signs (24h Range):  Temp:  [97.6 °F (36.4 °C)-98.1 °F (36.7 °C)] 97.7 °F (36.5 °C)  Pulse:  [] 99  Resp:  [16-20] 16  SpO2:  [94 %-100 %] 94 %  BP: ()/(44-59) 103/52     Weight: 90.3 kg (199 lb 1.2 oz)  Body mass index is 31.18 kg/m².    Intake/Output Summary (Last 24 hours) at 7/16/2019 0740  Last data filed at 7/16/2019 0500  Gross per 24 hour   Intake 820 ml   Output 167 ml   Net 653 ml      Physical Exam   Constitutional: She is oriented to person, place, and time. She appears well-developed and well-nourished. No distress.    HENT:   Head: Normocephalic and atraumatic.   Eyes: Conjunctivae are normal.   Neck: Normal range of motion.   Cardiovascular: Normal rate, regular rhythm and normal heart sounds.   Pulmonary/Chest: Effort normal and breath sounds normal. No respiratory distress.   Abdominal: Soft. She exhibits distension. There is no tenderness.   Musculoskeletal:   Bandage in place over RLE. Bandage with blood and gauze has not been changed since the procedure. Swelling of RLE. Improved motor and sensory function of RLE after discontinuation of nerve block.   Neurological: She is alert and oriented to person, place, and time.   Skin: No rash noted.       Significant Labs: All pertinent labs within the past 24 hours have been reviewed.    Significant Imaging: I have reviewed all pertinent imaging results/findings within the past 24 hours.      Assessment/Plan:      * Closed bicondylar fracture of right tibial plateau - s/p ORIF 7/11/19  -07/06, XR showing tibial fracture  -Ortho consulted and following  -PT/OT consulted and following  -07/06, Ortho did ex-fix  -07/11, underwent definitive surgery, ORIF, with Ortho  -07/13: Patient endorsed inability to move Rt foot toes and ankle with intact pulses and sensation  -07/14, nerve block was paused, and motor and sensory function were noted to be improved on exam. Anesthesia resumed nerve block after examination.  -07/16, nerve block discontinued. Pain control via Oxycodone PRNs, de-escalated to 5 and 10 mgs  -PT/OT recommended SNF placement    ESRD (end stage renal disease) on dialysis  -reports a clot in vascular access line noticed at dialysis on 07/05  -Neprhology consulted and following  -strict Is/Os, daily weights, renal diet  -Neprhology flushed line with alteplase on 07/07  -HD on 07/08. Went well with 1L fluid removal. Needed Midodrine once for low BP.  -HD on 07/10. Went well with 1.5L fluid removal. Received Midodrine once for low BP.  -HD on 07/12. Went well with 2.5L  fluid removal in 3 hours  -07/15, patient did not tolerate HD well, developed symptomatic hypotension, and HD was stopped after 0.5L of the planned 1L dialysis. CXR was obtained which showed no acute processes.   -HD on 07/15. Went well with 0.5L of fluid removed in 3.5 hours. VSS throughout duration.  -Midodrine changed to 15 mg TID scheudled, with 10 mg PRN for systolic <90, and 20 mg PRN 30 minutes before HD  -renal vitamins    At risk for venous thromboembolism (VTE)  -heparin 5k sq q8  -patient continues to refuse      Bulging discs - symptomatic   -gabapentin 300 BID      CAD (coronary artery disease)  -holding home ASA and plavix   Will resume once deemed safe from post procedure bleeding stand point       Hyperlipidemia  -restart home lipitor 40      Anemia in chronic kidney disease, on chronic dialysis  -Iron 325 mg BID  -epoetin 5800 U every MWF with dialysis  -On 07/16, Hb at 7.9, downtrending from 9.7 on 07/14.  -Anemia workup labs ordered  -Folate WNL  -B12 >2000  -Iron 22, TIBC 130, Transferrin 88, Ferritin 5153  -, Haptoglobin 244  -likely 2/2 CKD, decreased production of RBC      Diabetic neuropathy associated with type 2 diabetes mellitus  -History of falls 2/2 lower extremity neuropathy.  -Patient fell while walking down the steps outside of her house  -Resume home gabapentin 300 BID        VTE Risk Mitigation (From admission, onward)        Ordered     heparin (porcine) injection 5,000 Units  Every 8 hours      07/12/19 0754     heparin (porcine) injection 5,000 Units  Every 8 hours      07/09/19 1803     heparin (porcine) injection 5,000 Units  Every 8 hours      07/08/19 1415     heparin (porcine) injection 1,000 Units  As needed (PRN)      07/08/19 1210     Place sequential compression device  Until discontinued      07/06/19 1159                Kev Villegas MD  Department of Hospital Medicine   [facility]

## 2019-07-16 NOTE — PHYSICIAN QUERY
PT Name: Jenni Todd  MR #: [id number]     Physician Query Form - Cardiomyopathy Clarification     CDS/: Sita Wu RN   Contact information: [email or web address]  This form is a permanent document in the medical record.     Query Date: July 16, 2019    By submitting this query, we are merely seeking further clarification of documentation to reflect the severity of illness of your patient. Please utilize your independent clinical judgment when addressing the question(s) below.    The Medical record reflects the following:     Indicators   Supporting Clinical Findings Location in Medical Record   x Cardiomyopathy, NICM, CM or similar term documented cardiomyopathy H&P   x Acute/Chronic Illness Closed fracture of R tibial plateau  CAD, DM2, ESRD, HTN, FSGS, obesity, secondary hyperparathyroidism, anemia I CKD on HD H&P   x Echo, Radiology, and/or Heart Cath Findings 2D Echo:        Results for orders placed or performed during the hospital encounter of 11/03/16   2D echo with color flow doppler   Result Value Ref Range     QEF 60 55 - 65     Mitral Valve Regurgitation MILD       Diastolic Dysfunction No       Aortic Valve Stenosis TRIVIAL TO MILD      Anesthesia record 7/6    BiPAP/Intubation/Supplemental O2     x SOB, RETANA, Fatigue, Dizziness, LE Edema, Cyanosis, Chest Pain, Pulmonary HTN, Respiratory Distress, Hypoxia, etc. Pulmonary hypertension Anesthesia record 7/10   x Treatment                                 Medication Midodrine oral   Hydralazine injection MAR 7/16  MAR 7/6    Other       Provider, please specify the type of cardiomyopathy:    [   ] Hypertensive   [   ] Ischemic   [   ] Non-ischemic Dilated (congestive)    [   ] Non-ischemic Hypertrophic obstructive    [   ] Non-ischemic Hypertrophic non-obstructive   [   ] Non-ischemic Restrictive    [   ] Cardiomyopathy, type unspecified or unknown   [   ] Cardiomyopathy Ruled Out   [   ] Other type of cardiomyopathy (please  specify):    [   ]  Clinically Undetermined       Please document in your progress notes daily for the duration of treatment until resolved, and include in your discharge summary.

## 2019-07-16 NOTE — PROGRESS NOTES
[facility]  Orthopedics  Progress Note    Patient Name: Jenni Todd  MRN: [id number]  Admission Date: 7/6/2019  Hospital Length of Stay: 5 days  Attending Provider: Fiordaliza Llamas MD  Primary Care Provider: Michael Tan Iii, MD  Follow-up For: Procedure(s) (LRB):  ORIF, FRACTURE, TIBIA, PLATEAU (Right)  REMOVAL, EXTERNAL FIXATION DEVICE (Right)    Post-Operative Day: 5 Days Post-Op  Subjective:     Principal Problem:Closed bicondylar fracture of right tibial plateau    Principal Orthopedic Problem: same    Interval History: Patient seen and examined at bedside. NAEON.  Pain controlled.  Bed mobility with PT yesterday.    Review of patient's allergies indicates:   Allergen Reactions    Flagyl [metronidazole hcl] Nausea And Vomiting    Clindamycin Diarrhea    Metronidazole        Current Facility-Administered Medications   Medication    0.9%  NaCl infusion (for blood administration)    0.9%  NaCl infusion    0.9%  NaCl infusion    acetaminophen tablet 650 mg    atorvastatin tablet 40 mg    bisacodyl suppository 10 mg    dextrose 10% (D10W) Bolus    dextrose 10% (D10W) Bolus    epoetin adonay-epbx injection 5,800 Units    famotidine tablet 20 mg    ferrous sulfate EC tablet 325 mg    gabapentin capsule 300 mg    glucagon (human recombinant) injection 1 mg    glucose chewable tablet 16 g    glucose chewable tablet 24 g    heparin (porcine) injection 1,000 Units    heparin (porcine) injection 5,000 Units    HYDROmorphone injection 0.2 mg    insulin aspart U-100 pen 0-5 Units    midodrine tablet 10 mg    ondansetron injection 4 mg    oxyCODONE immediate release tablet 10 mg    oxyCODONE immediate release tablet 15 mg    oxyCODONE immediate release tablet 5 mg    polyethylene glycol packet 17 g    promethazine (PHENERGAN) 6.25 mg in dextrose 5 % 50 mL IVPB    ropivacaine (PF) 2 mg/ml (0.2%) infusion    senna tablet 8.6 mg    sevelamer carbonate tablet 800 mg     "sodium chloride 0.9% flush 10 mL    vitamin renal formula (B-complex-vitamin c-folic acid) 1 mg per capsule 1 capsule     Objective:     Vital Signs (Most Recent):  Temp: 99.6 °F (37.6 °C) (07/15/19 0503)  Pulse: 108 (07/15/19 0503)  Resp: 14 (07/15/19 0503)  BP: (!) 122/58 (07/15/19 0503)  SpO2: 98 % (07/15/19 0503) Vital Signs (24h Range):  Temp:  [96.5 °F (35.8 °C)-99.6 °F (37.6 °C)] 99.6 °F (37.6 °C)  Pulse:  [] 108  Resp:  [14-16] 14  SpO2:  [96 %-100 %] 98 %  BP: ()/(55-67) 122/58     Weight: 90.3 kg (199 lb 1.2 oz)  Height: 5' 7" (170.2 cm)  Body mass index is 31.18 kg/m².        Ortho/SPM Exam     AAOx4  NAD  Reg rate  No increased WOB     MSK:    Stage one right ischial ulcer without drainage or SOI    RLE:  Sensation decreased but present T/SP/DP  Some motor function T; No dorsiflexion toe/ankle  Compartments soft  TTP lower leg circumferentially   Dressing with 1x1cm area of drainage, no change  Leg elevated    Significant Labs:   CBC:   Recent Labs   Lab 07/14/19  0345   WBC 12.85*   HGB 9.7*   HCT 33.7*        CMP:   Recent Labs   Lab 07/14/19  0345   *   K 4.4   CL 95   CO2 14*   *   BUN 26*   CREATININE 5.8*   CALCIUM 11.6*   ALBUMIN 2.3*   ANIONGAP 22*   EGFRNONAA 7.9*       Significant Imaging: I have reviewed all pertinent imaging results/findings.    Assessment/Plan:     * Closed bicondylar fracture of right tibial plateau - s/p ORIF 7/11/19  48 y.o. female POD5 s/p R tibial plateau ORIF    Pain control: multimodal  PT/OT: NWB RLE  DVT PPx: lovenox, FCDs at all times when not ambulating  Abx: postop Ancef complete  Labs: reviewed  Drain: none  Wiggins: none    Dispo: SNF referrals sent            Shyam Cohen MD  Orthopedics  [facility]  "

## 2019-07-16 NOTE — PHYSICIAN QUERY
PT Name: Jenni Todd  MR #: [id number]     PHYSICIAN QUERY -  ELECTROLYTE CLARIFICATION      CDS/: Sita Wu RN    Contact information:[email or web address]  This form is a permanent document in the medical record.     Query Date: July 16, 2019    By submitting this query, we are merely seeking further clarification of documentation to reflect the severity of illness of your patient. Please utilize your independent clinical judgment when addressing the question(s) below.    The Medical record reflects the following:     Indicators   Supporting Clinical Findings Location in Medical Record   x Lab Value(s) Potassium 4.1    Sodium 131 chloride 93  Potassium   3.4  Sodium 136  Potassium  3.5    Sodium  129                               Sodium 129  Chloride 95  Potassium  4.1   Sodium 130   Chloride 92   Labs 7/6  Labs 7/9  Labs 7/12  Labs 7/13  Labs 7/16     x Treatment                                 Medication Potassium chloride SA CR oral MAR 7/9    Other       Provider, please specify the diagnosis or diagnoses that correspond(s) to the above indicators.     Devang all that apply:    [ X  ] Hypokalemia   [   ] Hyponatremia   [   ] Other electrolyte disturbance (please specify): _______   [   ]  Clinically Undetermined       Please document in your progress notes daily for the duration of treatment until resolved, and include in your discharge summary.

## 2019-07-16 NOTE — ASSESSMENT & PLAN NOTE
-Iron 325 mg BID  -epoetin 5800 U every MWF with dialysis  -On 07/16, Hb at 7.9, downtrending from 9.7 on 07/14.  -Anemia workup labs ordered  -Folate WNL  -B12 >2000  -Iron 22, TIBC 130, Transferrin 88, Ferritin 5153  -, Haptoglobin 244  -likely 2/2 CKD, decreased production of RBC

## 2019-07-16 NOTE — ASSESSMENT & PLAN NOTE
-reports a clot in vascular access line noticed at dialysis on 07/05  -Neprhology consulted and following  -strict Is/Os, daily weights, renal diet  -Neprhology flushed line with alteplase on 07/07  -HD on 07/08. Went well with 1L fluid removal. Needed Midodrine once for low BP.  -HD on 07/10. Went well with 1.5L fluid removal. Received Midodrine once for low BP.  -HD on 07/12. Went well with 2.5L fluid removal in 3 hours  -07/15, patient did not tolerate HD well, developed symptomatic hypotension, and HD was stopped after 0.5L of the planned 1L dialysis. CXR was obtained which showed no acute processes.   -HD on 07/15. Went well with 0.5L of fluid removed in 3.5 hours. VSS throughout duration.  -Midodrine changed to 15 mg TID damian, with 10 mg PRN for systolic <90, and 20 mg PRN 30 minutes before HD  -renal vitamins

## 2019-07-16 NOTE — SUBJECTIVE & OBJECTIVE
Review of Systems   Constitutional: Negative for chills and fever.   HENT: Negative for sore throat.    Respiratory: Negative for shortness of breath and wheezing.    Cardiovascular: Positive for leg swelling. Negative for chest pain.   Gastrointestinal: Positive for abdominal distention. Negative for abdominal pain.   Genitourinary: Negative for decreased urine volume.   Musculoskeletal: Positive for joint swelling.   Neurological: Negative for dizziness and light-headedness.   Hematological: Negative for adenopathy. Does not bruise/bleed easily.     Objective:     Vital Signs (Most Recent):  Temp: 97.7 °F (36.5 °C) (07/16/19 0458)  Pulse: 99 (07/16/19 0458)  Resp: 16 (07/16/19 0458)  BP: (!) 103/52 (07/16/19 0500)  SpO2: (!) 94 % (07/16/19 0458) Vital Signs (24h Range):  Temp:  [97.6 °F (36.4 °C)-98.1 °F (36.7 °C)] 97.7 °F (36.5 °C)  Pulse:  [] 99  Resp:  [16-20] 16  SpO2:  [94 %-100 %] 94 %  BP: ()/(44-59) 103/52     Weight: 90.3 kg (199 lb 1.2 oz)  Body mass index is 31.18 kg/m².    Intake/Output Summary (Last 24 hours) at 7/16/2019 0740  Last data filed at 7/16/2019 0500  Gross per 24 hour   Intake 820 ml   Output 167 ml   Net 653 ml      Physical Exam   Constitutional: She is oriented to person, place, and time. She appears well-developed and well-nourished. No distress.   HENT:   Head: Normocephalic and atraumatic.   Eyes: Conjunctivae are normal.   Neck: Normal range of motion.   Cardiovascular: Normal rate, regular rhythm and normal heart sounds.   Pulmonary/Chest: Effort normal and breath sounds normal. No respiratory distress.   Abdominal: Soft. She exhibits distension. There is no tenderness.   Musculoskeletal:   Bandage in place over RLE. Bandage with blood and gauze has not been changed since the procedure. Swelling of RLE. Improved motor and sensory function of RLE after discontinuation of nerve block.   Neurological: She is alert and oriented to person, place, and time.   Skin: No rash  noted.       Significant Labs: All pertinent labs within the past 24 hours have been reviewed.    Significant Imaging: I have reviewed all pertinent imaging results/findings within the past 24 hours.

## 2019-07-17 PROBLEM — Z78.9 IMPAIRED MOBILITY AND ADLS: Status: ACTIVE | Noted: 2019-01-01

## 2019-07-17 PROBLEM — I82.4Y3 ACUTE DEEP VEIN THROMBOSIS (DVT) OF PROXIMAL VEIN OF BOTH LOWER EXTREMITIES: Status: ACTIVE | Noted: 2019-01-01

## 2019-07-17 PROBLEM — I82.403 ACUTE DEEP VEIN THROMBOSIS (DVT) OF BOTH LOWER EXTREMITIES: Status: ACTIVE | Noted: 2019-01-01

## 2019-07-17 PROBLEM — Z74.09 IMPAIRED MOBILITY AND ADLS: Status: ACTIVE | Noted: 2019-01-01

## 2019-07-17 NOTE — ASSESSMENT & PLAN NOTE
-07/06, XR showing tibial fracture  -Ortho consulted and following  -PT/OT consulted and following  -07/06, Ortho did ex-fix  -07/11, underwent definitive surgery, ORIF, with Ortho  -07/13: Patient endorsed inability to move Rt foot toes and ankle with intact pulses and sensation  -07/14, nerve block was paused, and motor and sensory function were noted to be improved on exam. Anesthesia resumed nerve block after examination.  -07/16, nerve block discontinued.   -07/17, Pain control via Oxycodone PRNs, de-escalated to 5 mgs  -PT/OT recommended SNF placement

## 2019-07-17 NOTE — NURSING
Patient BP up to 118/51, paged Med Team 2 as patient has delayed responses today, not communicating thoughts but no slurred speech, patient also seems to constantly be looking up to right side and not directly at objects.  Spoke with Silvestre Hutchins with Med team notified him of above, he is discussing with team, patient getting vascular us currently and will await test results, no new orders given to this nurse.

## 2019-07-17 NOTE — HOSPITAL COURSE
7/7/19:  Evaluated by PT.  Bed mobility totalA (maxA x 2).  Sit to stand dependent (totalA x 2) with RW.  EOB SBA.   7/9/19:  Evaluated by OT.  Participated with PT.  Bed mobility mod-totalA (modA x 2).  EOB SV.  Grooming SV while seated EOB.   7/11/19:  No PT or OT 2/2 off floor for surgery.  7/12/19:  No PT or OT 2/2 pain and off floor for HD.    7/13/19:  Re-evaluated by PT and OT.  Bed mobility max-totalA (maxA x 2).  Sit to stand totalA.  Orthostatic hypotension with PT.  EOB x ~18 minutes CGA-Letty.  UBD Letty and LBD totalA.    7/15/19:  Participated with PT and OT.  Bed mobility totalA-dependent (totalA x 2).  EOB x ~13 minutes CGA.  Grooming modA while seated EOB.    7/16/19:  No PT or OT 2/2 HD.

## 2019-07-17 NOTE — CARE UPDATE
Rapid Response Nurse Chart Check     Chart check completed, abnormal VS noted, bedside RNKeila contacted, no concerns   verbalized at this time, instructed to call [phone] for further concerns or assistance.

## 2019-07-17 NOTE — HPI
Ms. Todd is a 47 yo F with PMH of ESRD (on HD MWF via R femoral permacath), DM2, neuropathy, HTN, HLD, CAD, obesity, bulging discs in back who presented to the ED via ambulance following a fall with resultant R tibial fracture s/p ex-fix and definitive repair with Ortho on 7/11. At the time of presentation she was also noted to have clotted R femoral access line, which was declotted per nephrology with alteplase. She has been dialyzing via this permacath throughout her stay without significant issues until 7/15 when she became hypotensive requiring increase in midodrine. She has continued to require midodrine since this point. She also was noted to complain of bilateral leg pain and DVT US obtained which demonstrated bilateral DVTs in iliac and femoral veins. Of note she has been refusing her ppx heparin given concern of GI bleed since she has a remote history of GI bleed. Notably her Hb was 7 on morning labs, a decrease from 9.7 from 3 days prior. She was transfused 1 unit pRBC. Vascular surgery was consulted for evaluation for need for possible IVC filter.

## 2019-07-17 NOTE — SUBJECTIVE & OBJECTIVE
Principal Problem:Closed fracture of right tibial plateau    Principal Orthopedic Problem: same    Interval History: Patient seen and examined at bedside. NAEON.  Pain controlled.  No PT/OT yesterday.  Having mild altered mental status this AM with hypotension.  Stable mild tachycardia.  Maintaining sats on RA.  Has been refusing DVT PPx since admission.  DVT US showing B iliac, B CFA DVT.    Review of patient's allergies indicates:   Allergen Reactions    Flagyl [metronidazole hcl] Nausea And Vomiting    Clindamycin Diarrhea       Current Facility-Administered Medications   Medication    0.9%  NaCl infusion (for blood administration)    0.9%  NaCl infusion (for blood administration)    0.9%  NaCl infusion    [START ON 7/18/2019] 0.9%  NaCl infusion    acetaminophen tablet 650 mg    atorvastatin tablet 40 mg    bisacodyl suppository 10 mg    dextrose 10% (D10W) Bolus    dextrose 10% (D10W) Bolus    epoetin adonay-epbx injection 5,800 Units    famotidine tablet 20 mg    ferrous sulfate EC tablet 325 mg    gabapentin capsule 300 mg    glucagon (human recombinant) injection 1 mg    glucose chewable tablet 16 g    glucose chewable tablet 24 g    heparin (porcine) injection 1,000 Units    heparin 25,000 units in dextrose 5% (100 units/ml) IV bolus from bag - ADDITIONAL PRN BOLUS - 30 units/kg    heparin 25,000 units in dextrose 5% (100 units/ml) IV bolus from bag - ADDITIONAL PRN BOLUS - 60 units/kg    heparin 25,000 units in dextrose 5% 250 mL (100 units/mL) infusion HIGH INTENSITY nomogram - [facility]    insulin aspart U-100 pen 0-5 Units    midodrine tablet 10 mg    midodrine tablet 15 mg    midodrine tablet 20 mg    ondansetron injection 4 mg    oxyCODONE immediate release tablet 5 mg    oxyCODONE immediate release tablet Tab 10 mg    pantoprazole EC tablet 40 mg    polyethylene glycol packet 17 g    promethazine (PHENERGAN) 6.25 mg in dextrose 5 % 50 mL IVPB    senna tablet 8.6 mg     "sevelamer carbonate tablet 800 mg    sodium chloride 0.9% flush 10 mL    vitamin renal formula (B-complex-vitamin c-folic acid) 1 mg per capsule 1 capsule     Objective:     Vital Signs (Most Recent):  Temp: 98.6 °F (37 °C) (07/17/19 1117)  Pulse: 97 (07/17/19 1140)  Resp: 16 (07/17/19 0751)  BP: (!) 98/53 (07/17/19 1117)  SpO2: 95 % (07/17/19 1117) Vital Signs (24h Range):  Temp:  [97.4 °F (36.3 °C)-98.8 °F (37.1 °C)] 98.6 °F (37 °C)  Pulse:  [] 97  Resp:  [16-20] 16  SpO2:  [94 %-98 %] 95 %  BP: ()/(41-56) 98/53     Weight: 90.3 kg (199 lb 1.2 oz)  Height: 5' 7" (170.2 cm)  Body mass index is 31.18 kg/m².        Ortho/SPM Exam     AAOx4  NAD  Reg rate  No increased WOB     MSK:    Stage one right ischial ulcer without drainage or SOI    RLE:  Sensation decreased but present T/SP/DP  Some motor function T; No dorsiflexion toe/ankle  Compartments soft  TTP lower leg circumferentially   Dressing with 1x1cm area of drainage, no change  Leg elevated    Significant Labs:   CBC:   Recent Labs   Lab 07/16/19  0325 07/17/19  0520 07/17/19  1144   WBC 14.59* 20.47* 20.91*   HGB 7.9* 7.0* 7.0*   HCT 27.9* 22.7* 23.3*    214 197     CMP:   Recent Labs   Lab 07/16/19  0325 07/17/19  0520   * 133*   K 4.1 3.7   CL 92* 90*   CO2 16* 22*   * 124*   BUN 31* 25*   CREATININE 5.2* 3.8*   CALCIUM 11.1* 10.7*   ALBUMIN 2.3* 2.2*   ANIONGAP 22* 21*   EGFRNONAA 9.0* 13.2*       Significant Imaging: I have reviewed all pertinent imaging results/findings.  "

## 2019-07-17 NOTE — PT/OT/SLP PROGRESS
Physical Therapy      Patient Name:  Jenni Todd   MRN:  [id number]    Patient not seen today secondary to other (off floor for vascular ultrasound which found B DVT's). Patient now scheduled to get a heparin drip and a unit of blood. Will follow-up again at next scheduled visit.    Amy Agarwal, PT

## 2019-07-17 NOTE — ASSESSMENT & PLAN NOTE
-  Presented after fall with RLE trauma  -  Imaging revealed R tibial fracture  -  S/p ex-fix on 7/6/19 and ORIF on 7/11/19 of R tibial plateau fracture  -  Ortho following--> post-op NWB RLE

## 2019-07-17 NOTE — ASSESSMENT & PLAN NOTE
48 y.o. female POD6 s/p R tibial plateau ORIF    Pain control: multimodal  PT/OT: NWB RLE  DVT PPx: therapeutic heparin drip for DVT, FCDs at all times when not ambulating  Abx: postop Ancef complete  Labs: Hb 7, WBC 21  Drain: none  Wiggins: none    Dispo: Therapeutic heparin drip, vascular consult for DVT management

## 2019-07-17 NOTE — ASSESSMENT & PLAN NOTE
-reports a clot in vascular access line noticed at dialysis on 07/05  -Neprhology consulted and following  -strict Is/Os, daily weights, renal diet  -Neprhology flushed line with alteplase on 07/07  -HD on 07/08. Went well with 1L fluid removal. Needed Midodrine once for low BP.  -HD on 07/10. Went well with 1.5L fluid removal. Received Midodrine once for low BP.  -HD on 07/12. Went well with 2.5L fluid removal in 3 hours  -07/15, patient did not tolerate HD well, developed symptomatic hypotension, and HD was stopped after 0.5L of the planned 1L dialysis. CXR was obtained which showed no acute processes.   -HD on 07/15. Went well with 0.5L of fluid removed in 3.5 hours. VSS throughout duration.  -Midodrine changed to 15 mg TID scheudled, with 10 mg PRN for systolic <90, and 20 mg PRN 30 minutes before HD  -Did not undergo dialysis on 07/17 due to episodes of hypotension likely 2/2 volume depletion and anemia  -500 mL bolus NS  -renal vitamins

## 2019-07-17 NOTE — PHYSICIAN QUERY
"PT Name: Jenni Todd  MR #: [id number]    Physician Query Form - Hematology Clarification      CDS/: Sita Wu RN               Contact information:[email or web address]    This form is a permanent document in the medical record.      Query Date: July 17, 2019    By submitting this query, we are merely seeking further clarification of documentation. Please utilize your independent clinical judgment when addressing the question(s) below.    The Medical record contains the following:   Indicators  Supporting Clinical Findings Location in Medical Record   x "Anemia" documented Hospital course complicated by symptomatic hypotension, anemia, and BLE DVT (started on heparin infusion on 7/17/19).    Anemia in chronic kidney disease, on chronic dialysis  Physical Medicine and Rehabilitation consult      Hospital medicine PN 7/16   x H & H = H&H=9.3/30.6  H&H=9.5/31.4  H&H=10.4/34.3  H&H=9.7/33.7  H&H=7.9/27.9  H&H=7/22.7 Labs 7/6  Labs 7/10  Labs 7/11  Labs 7/14  Labs 7/16  Labs 7/17   x BP =                     HR= BP 76/41  /52  HR 99  /59  HR 92 Nursing note 7/17@0804  Vital signs 7/16@0500  Vital signs 7/15@1423    "GI bleeding" documented      Acute bleeding (Non GI site)     x Transfusion(s) Transfused with 2 units  Transfused with 1 unit   Started on heparin and blood transfusion. Blood bank record 7/6  Blood bank record 7/10  Occupational therapy note 7/17   x Treatment: Ferrous sulfate EC oral MAR 7/6-present   x Other:  Acute deep vein thrombus in the right iliac, right common femoral, left iliac, left common femoral, and left femoral veins.    Right bicondylar tibial plateau fracture status post spanning external fixation  Procedure: Open treatment of right bicondylar tibial plateau fracture with reduction and internal fixation  Removal, under anesthesia of external fixation right leg   EBL 75 cc     Right tibial plateau fracture   PROCEDURE PERFORMED:  Spanning external fixation " of tibial plateau fracture  Closed treatment of tibial plateau fracture with   manipulation under anesthesia  ESTIMATED BLOOD LOSS: minimal  cc Bilateral lower extremity ultrasound 7/17      OP note 7/11              OP note 7/6                 Provider, please specify diagnosis or diagnoses associated with above clinical findings.    [ X ]  Acute blood loss anemia expected post-operatively   [  ]  Acute blood loss anemia   [  ]  Iron deficiency anemia      [  ] Anemia of chronic disease ( Specify chronic disease)       [  ] CKD   [  ] Other (Specify):   [  ] Clinically Undetermined       [  ] Other Hematological Diagnosis (please specify):     [  ] Clinically Undetermined       Please document in your progress notes daily for the duration of treatment, until resolved, and include in your discharge summary.

## 2019-07-17 NOTE — NURSING
Paged med team 2, family also concerned about patient delayed responses today and requesting work up.

## 2019-07-17 NOTE — PT/OT/SLP PROGRESS
Occupational Therapy      Patient Name:  Jenni Todd   MRN:  [id number]    Patient not seen today secondary to medical hold. Pt. Diagnosed with 4 blood clots in legs. Pt. Started on heparin and blood transfusion. Pt awaiting PICC access. Will follow-up 7/18/19.    Maria Alejandra Raymond OT  7/17/2019

## 2019-07-17 NOTE — PROGRESS NOTES
[facility]  Orthopedics  Progress Note    Patient Name: Jenni Todd  MRN: [id number]  Admission Date: 7/6/2019  Hospital Length of Stay: 6 days  Attending Provider: Fiordaliza Llamas MD  Primary Care Provider: Michael Tan Iii, MD  Follow-up For: Procedure(s) (LRB):  ORIF, FRACTURE, TIBIA, PLATEAU (Right)  REMOVAL, EXTERNAL FIXATION DEVICE (Right)    Post-Operative Day: 6 Days Post-Op  Subjective:     Principal Problem:Closed fracture of right tibial plateau    Principal Orthopedic Problem: same    Interval History: Patient seen and examined at bedside. NAEON.  Pain controlled.  No PT/OT yesterday.  Having mild altered mental status this AM with hypotension.  Stable mild tachycardia.  Maintaining sats on RA.  Has been refusing DVT PPx since admission.  DVT US showing B iliac, B CFA DVT.    Review of patient's allergies indicates:   Allergen Reactions    Flagyl [metronidazole hcl] Nausea And Vomiting    Clindamycin Diarrhea       Current Facility-Administered Medications   Medication    0.9%  NaCl infusion (for blood administration)    0.9%  NaCl infusion (for blood administration)    0.9%  NaCl infusion    [START ON 7/18/2019] 0.9%  NaCl infusion    acetaminophen tablet 650 mg    atorvastatin tablet 40 mg    bisacodyl suppository 10 mg    dextrose 10% (D10W) Bolus    dextrose 10% (D10W) Bolus    epoetin adonay-epbx injection 5,800 Units    famotidine tablet 20 mg    ferrous sulfate EC tablet 325 mg    gabapentin capsule 300 mg    glucagon (human recombinant) injection 1 mg    glucose chewable tablet 16 g    glucose chewable tablet 24 g    heparin (porcine) injection 1,000 Units    heparin 25,000 units in dextrose 5% (100 units/ml) IV bolus from bag - ADDITIONAL PRN BOLUS - 30 units/kg    heparin 25,000 units in dextrose 5% (100 units/ml) IV bolus from bag - ADDITIONAL PRN BOLUS - 60 units/kg    heparin 25,000 units in dextrose 5% 250 mL (100 units/mL) infusion HIGH  "INTENSITY nomogram - [facility]    insulin aspart U-100 pen 0-5 Units    midodrine tablet 10 mg    midodrine tablet 15 mg    midodrine tablet 20 mg    ondansetron injection 4 mg    oxyCODONE immediate release tablet 5 mg    oxyCODONE immediate release tablet Tab 10 mg    pantoprazole EC tablet 40 mg    polyethylene glycol packet 17 g    promethazine (PHENERGAN) 6.25 mg in dextrose 5 % 50 mL IVPB    senna tablet 8.6 mg    sevelamer carbonate tablet 800 mg    sodium chloride 0.9% flush 10 mL    vitamin renal formula (B-complex-vitamin c-folic acid) 1 mg per capsule 1 capsule     Objective:     Vital Signs (Most Recent):  Temp: 98.6 °F (37 °C) (07/17/19 1117)  Pulse: 97 (07/17/19 1140)  Resp: 16 (07/17/19 0751)  BP: (!) 98/53 (07/17/19 1117)  SpO2: 95 % (07/17/19 1117) Vital Signs (24h Range):  Temp:  [97.4 °F (36.3 °C)-98.8 °F (37.1 °C)] 98.6 °F (37 °C)  Pulse:  [] 97  Resp:  [16-20] 16  SpO2:  [94 %-98 %] 95 %  BP: ()/(41-56) 98/53     Weight: 90.3 kg (199 lb 1.2 oz)  Height: 5' 7" (170.2 cm)  Body mass index is 31.18 kg/m².        Ortho/SPM Exam     AAOx4  NAD  Reg rate  No increased WOB     MSK:    Stage one right ischial ulcer without drainage or SOI    RLE:  Sensation decreased but present T/SP/DP  Some motor function T; No dorsiflexion toe/ankle  Compartments soft  TTP lower leg circumferentially   Dressing with 1x1cm area of drainage, no change  Leg elevated    Significant Labs:   CBC:   Recent Labs   Lab 07/16/19 0325 07/17/19  0520 07/17/19  1144   WBC 14.59* 20.47* 20.91*   HGB 7.9* 7.0* 7.0*   HCT 27.9* 22.7* 23.3*    214 197     CMP:   Recent Labs   Lab 07/16/19  0325 07/17/19  0520   * 133*   K 4.1 3.7   CL 92* 90*   CO2 16* 22*   * 124*   BUN 31* 25*   CREATININE 5.2* 3.8*   CALCIUM 11.1* 10.7*   ALBUMIN 2.3* 2.2*   ANIONGAP 22* 21*   EGFRNONAA 9.0* 13.2*       Significant Imaging: I have reviewed all pertinent imaging results/findings.    Assessment/Plan:     * " Closed fracture of right tibial plateau  48 y.o. female POD6 s/p R tibial plateau ORIF    Pain control: multimodal  PT/OT: NWB RLE  DVT PPx: therapeutic heparin drip for DVT, FCDs at all times when not ambulating  Abx: postop Ancef complete  Labs: Hb 7, WBC 21  Drain: none  Wiggins: none    Dispo: Therapeutic heparin drip, vascular consult for DVT management            Shyam Cohen MD  Orthopedics  [facility]    Att Note:  Patient seen and examined.  I agree with the resident's assessment and plan.  Patient with bilateral proximal DVTs in the iliacs and femoral arteries.  Patient has been refusing DVT prophylaxis since admission.  I had another long discussion with the patient today about the importance of maintaining her therapeutic anticoagulation and the possible consequences of death from a large scale PE.  We will have vascular surgery evaluate the patient.    Dominick Desai MD

## 2019-07-17 NOTE — CONSULTS
[facility]  Physical Medicine & Rehab  Consult Note    Patient Name: Jenni Todd  MRN: [id number]  Admission Date: 7/6/2019  Hospital Length of Stay: 6 days  Attending Physician: Fiordaliza Llamas MD     Inpatient consult to Physical Medicine & Rehabilitation  Consult performed by: Kathy Tesfaye NP  Consult requested by:  Fiordaliza Llamas MD    Collaborating Physician: Suzan Campos MD  Reason for Consult:  Rehab evaluation    Consults  Subjective:     Principal Problem: Closed bicondylar fracture of right tibial plateau    HPI: Jenni Todd is a 48-year-old female with PMHx of obesity, HTN, HLD, DMII, neuropathy, ESRD on HD (MWF), CAD, cardiomyopathy, NSTEMI, TIA, and symptomatic bulging discs.  Patient presented to [facility] on 7/6/19 after fall with RLE trauma.  On arrival, found to have R tibial fracture.  Ortho consulted, and she underwent ex-fix on 7/6/19 and ORIF on 7/11/19 of R tibial plateau fracture.  Post-op NWB RLE.  Nephrology following for ESRD.  Hospital course complicated by symptomatic hypotension, anemia, and BLE DVT (started on heparin infusion on 7/17/19).    Functional History: Patient lives in [place] with her step-daughter in a single story home without steps to enter.  Prior to admission, she was (I)/mod(I) with ADLs (aid 3x per week for bathing) and mod(I) with mobility.  Used rollator for short distances and WC for longer distances.  DME: WC, rollator, BSC.    Hospital Course:   7/7/19:  Evaluated by PT.  Bed mobility totalA (maxA x 2).  Sit to stand dependent (totalA x 2) with RW.  EOB SBA.   7/9/19:  Evaluated by OT.  Participated with PT.  Bed mobility mod-totalA (modA x 2).  EOB SV.  Grooming SV while seated EOB.   7/11/19:  No PT or OT 2/2 off floor for surgery.  7/12/19:  No PT or OT 2/2 pain and off floor for HD.    7/13/19:  Re-evaluated by PT and OT.  Bed mobility max-totalA (maxA x 2).  Sit to stand totalA.  Orthostatic hypotension with PT.  EOB x ~18  minutes CGA-Letty.  UBD Letty and LBD totalA.    7/15/19:  Participated with PT and OT.  Bed mobility totalA-dependent (totalA x 2).  EOB x ~13 minutes CGA.  Grooming modA while seated EOB.    19:  No PT or OT 2/2 HD.     Past Medical History:   Diagnosis Date    Abnormal finding on Pap smear, HPV DNA positive     Anemia associated with chronic renal failure     on Epogen    Blood type B+     Bulging discs - symptomatic      CAD (coronary artery disease)     Cardiomyopathy 3/13/2019    Diabetes mellitus, type 2     ESRD (end stage renal disease) 2004    FSGS (focal segmental glomerulosclerosis)     with collapsing    Hyperlipidemia     Hypertension     Neuropathy     NSTEMI (non-ST elevated myocardial infarction) 3/29/2019    Obesity     Secondary hyperparathyroidism, renal     TIA (transient ischemic attack)     Uterine fibroid     small uterine      Past Surgical History:   Procedure Laterality Date    ANGIOGRAM, CORONARY ARTERY N/A 4/15/2019    Performed by Roland [facility]ier MD at [facility] CATH LAB    APPLICATION, EXTERNAL FIXATION DEVICE, LARGE, TIBIA- SYNTHES Right 2019    Performed by Corwin Lopez MD at [facility] OR 2ND FLR    CARDIAC CATHETERIZATION      PCI x 2     SECTION, CLASSIC      COLONOSCOPY N/A 2018    Performed by Rodrigue Russell MD at [facility] ENDO (2ND FLR)    DEBRIDEMENT-WOUND Left 2016    Performed by Teressa Maddox MD at [facility] OR    DIALYSIS FISTULA CREATION      multiple fistulas and grafts before PD     EGD (ESOPHAGOGASTRODUODENOSCOPY) N/A 3/14/2019    Performed by Adolph [facility]ila MD at [facility] ENDO    EGD (ESOPHAGOGASTRODUODENOSCOPY) N/A 3/13/2019    Performed by [facility] at [facility] ENDO    INCISION AND DRAINAGE (I&D), LABIAL N/A 2016    Performed by Harmony Fernandez MD at [facility] OR    INCISION AND DRAINAGE (I&D), LABIAL (ADD ON) Left 2016    Performed by Teressa Maddox MD at [facility] OR    INCISION AND  DRAINAGE OF WOUND Left 2016    VULVAR ABCESS WITH NECROSIS    Insertion, Catheter, Central Venous, Hemodialysis N/A 3/28/2019    Performed by Kimo Weeks MD at [facility] CATH LAB    Insertion, Catheter, Central Venous, Hemodialysis N/A 3/18/2019    Performed by Kimo Weeks MD at [facility] CATH LAB    INSERTION, CATHETER, TUNNELED ABORTED Left 3/14/2019    Performed by Servando Solis MD at [facility] OR    INSERTION, GRAFT, ARTERIOVENOUS, RIGHT ARM Right 3/22/2019    Performed by BESSIE Wynn III, MD at [facility] OR 2ND FLR    INSERTION, INTRA-AORTIC BALLOON PUMP  4/15/2019    Performed by Roland Napier MD at [facility] CATH LAB    Left heart cath Left 4/15/2019    Performed by Roland Napier MD at [facility] CATH LAB    ORIF, FRACTURE, TIBIA, PLATEAU Right 7/11/2019    Performed by Dominick Desai MD at [facility] OR 2ND FLR    ORIF, HIP Left 9/13/2018    Performed by Tevin [facility]ullon MD at [facility] OR    PERITONEAL CATHETER INSERTION      PERMCATH REWIRE- TUNNELED CATH REWIRE Left 11/13/2017    Performed by Baldev Munroe MD at [facility] CATH LAB    PERMCATH REWIRE- TUNNELED CATH REWIRE N/A 10/5/2017    Performed by Baldev Munroe MD at [facility] CATH LAB    REMOVAL, CATHETER, DIALYSIS, PERITONEAL N/A 3/14/2019    Performed by Servando Solis MD at [facility] OR    REMOVAL, EXTERNAL FIXATION DEVICE Right 7/11/2019    Performed by Dominick Desai MD at [facility] OR 2ND FLR    REPLACEMENT, CATHETER, DIALYSIS, OVER GUIDEWIRE, USING EXISTING VENOUS ACCESS N/A 6/27/2019    Performed by Kimo Weeks MD at [facility] CATH LAB    REPLACEMENT, CATHETER, DIALYSIS, OVER GUIDEWIRE, USING EXISTING VENOUS ACCESS Left 5/24/2019    Performed by Baldev Munroe MD at [facility] CATH LAB    UMBILICAL HERNIA REPAIR      Venogram, possible intervention Right 3/20/2019    Performed by BESSIE Wynn III, MD at [facility] CATH LAB     Review of patient's allergies indicates:   Allergen Reactions    Flagyl [metronidazole hcl] Nausea And Vomiting     Clindamycin Diarrhea       Scheduled Medications:    [START ON 7/18/2019] sodium chloride 0.9%   Intravenous Once    acetaminophen  650 mg Oral Q6H    atorvastatin  40 mg Oral QHS    epoetin adonay-ebpx (RETACRIT) injection  5,800 Units Intravenous Every Mon, Wed, Fri    ferrous sulfate  325 mg Oral Daily    gabapentin  300 mg Oral BID    heparin (PORCINE)  80 Units/kg (Adjusted) Intravenous Once    midodrine  15 mg Oral TID    pantoprazole  40 mg Oral Daily    polyethylene glycol  17 g Oral Daily    senna  8.6 mg Oral QHS    sevelamer carbonate  800 mg Oral TID WM    vitamin renal formula (B-complex-vitamin c-folic acid)  1 capsule Oral Daily       PRN Medications: sodium chloride, sodium chloride, sodium chloride 0.9%, bisacodyl, Dextrose 10% Bolus, Dextrose 10% Bolus, famotidine, glucagon (human recombinant), glucose, glucose, heparin (porcine), heparin (PORCINE), heparin (PORCINE), insulin aspart U-100, midodrine, midodrine, ondansetron, oxyCODONE, oxyCODONE, promethazine (PHENERGAN) IVPB, sodium chloride 0.9%    Family History     Problem Relation (Age of Onset)    Cancer Maternal Grandmother, Paternal Grandfather    Diabetes Maternal Aunt, Paternal Aunt        Tobacco Use    Smoking status: Never Smoker    Smokeless tobacco: Never Used   Substance and Sexual Activity    Alcohol use: No     Comment: Pt reports some social use of about 1-2 drinks about every six months.    Drug use: No    Sexual activity: Not Currently     Partners: Male     Birth control/protection: None     Review of Systems   Constitutional: Positive for activity change and fatigue. Negative for chills and fever.   HENT: Negative for drooling, hearing loss, trouble swallowing and voice change.    Eyes: Negative for pain and visual disturbance.   Respiratory: Negative for cough, shortness of breath and wheezing.    Cardiovascular: Negative for chest pain and palpitations.   Gastrointestinal: Positive for abdominal distention.  Negative for nausea and vomiting.   Genitourinary: Positive for difficulty urinating. Negative for flank pain.   Musculoskeletal: Positive for arthralgias, back pain, gait problem and myalgias.   Skin: Positive for wound. Negative for rash.   Neurological: Positive for light-headedness and numbness. Negative for dizziness and headaches.   Psychiatric/Behavioral: Negative for agitation and hallucinations. The patient is not nervous/anxious.      Objective:     Vital Signs (Most Recent):  Temp: 98.6 °F (37 °C) (07/17/19 1117)  Pulse: 94 (07/17/19 1117)  Resp: 16 (07/17/19 0751)  BP: (!) 98/53 (07/17/19 1117)  SpO2: 95 % (07/17/19 1117)    Vital Signs (24h Range):  Temp:  [97.4 °F (36.3 °C)-98.8 °F (37.1 °C)] 98.6 °F (37 °C)  Pulse:  [] 94  Resp:  [16-20] 16  SpO2:  [94 %-98 %] 95 %  BP: ()/(41-56) 98/53     Body mass index is 31.18 kg/m².    Physical Exam   Constitutional: She is oriented to person, place, and time. She appears well-developed and well-nourished. No distress.   HENT:   Head: Normocephalic and atraumatic.   Right Ear: External ear normal.   Left Ear: External ear normal.   Nose: Nose normal.   Eyes: Right eye exhibits no discharge. Left eye exhibits no discharge. No scleral icterus.   Neck: Normal range of motion.   Cardiovascular: Normal rate and intact distal pulses.   Pulmonary/Chest: Effort normal. No respiratory distress. She has no wheezes.   Abdominal: Soft. She exhibits distension. There is no tenderness.   Musculoskeletal: She exhibits edema (RLE) and tenderness (RLE).   RLE with dressing CDI   Neurological: She is alert and oriented to person, place, and time. She exhibits normal muscle tone.   Skin: Skin is warm and dry. There is pallor.   Psychiatric: She has a normal mood and affect. Her behavior is normal. Thought content normal.   Vitals reviewed.    Diagnostic Results:   Labs: Reviewed  X-Ray: Reviewed  US: Reviewed  CT: Reviewed    Assessment/Plan:     * Closed bicondylar  fracture of right tibial plateau - s/p ORIF 7/11/19  -  Presented after fall with RLE trauma  -  Imaging revealed R tibial fracture  -  S/p ex-fix on 7/6/19 and ORIF on 7/11/19 of R tibial plateau fracture  -  Ortho following--> post-op NWB RLE    ESRD (end stage renal disease) on dialysis  -  HD MWF  -  Nephrology following     Diabetic neuropathy associated with type 2 diabetes mellitus  -  On home gabapentin   -  H/o falls related to neuropathy   -  Fall precautions    Impaired mobility and ADLs  -  Mod(I) with ADLs and mobility prior to admission  -  Aid 3x per week to assist with bathing  -  Post-op NWB RLE  Recommendations  -  Encourage mobility, OOB in chair, and early ambulation as appropriate  -  PT/OT evaluate and treat  -  Pain management  -  DVT prophylaxis  -  Monitor for and prevent skin breakdown and pressure ulcers  · Early mobility, repositioning/weight shifting every 20-30 minutes when sitting, turn patient every 2 hours, proper mattress/overlay and chair cushioning, pressure relief/heel protector boots    Recommend SNF at this time.  Started on heparin gtt for BLE DVT.  Not ready for discharge.  Continue PT and OT as appropriate.  Will follow for change in post-acute recommendation.    Thank you for your consult.     PAOLO Sandhu  Department of Physical Medicine & Rehab  [facility]

## 2019-07-17 NOTE — ASSESSMENT & PLAN NOTE
-  Mod(I) with ADLs and mobility prior to admission  -  Aid 3x per week to assist with bathing  -  Post-op NWB RLE  Recommendations  -  Encourage mobility, OOB in chair, and early ambulation as appropriate  -  PT/OT evaluate and treat  -  Pain management  -  DVT prophylaxis  -  Monitor for and prevent skin breakdown and pressure ulcers  · Early mobility, repositioning/weight shifting every 20-30 minutes when sitting, turn patient every 2 hours, proper mattress/overlay and chair cushioning, pressure relief/heel protector boots

## 2019-07-17 NOTE — NURSING
Med Team called regarding patient dizzy after trying to sit up, notified them was about to take vitals, called team back notified BP 76/41, will give midodrine and hold pain meds for now.

## 2019-07-17 NOTE — NURSING
REBECCA Grant notified that patient difficult IV access, that patient needs additional IV site for heparin and now 1 unit RBCs.  MD did put in order for PICC line, spoke with Maximo and notified of heparin drip and blood transfusion, will come to floor shortly.

## 2019-07-17 NOTE — ASSESSMENT & PLAN NOTE
-07/17, patient complaining of bilateral leg pain  -has refused heparin DVT prophylaxis throughout her admission due to a prior GI bleed  -US bilateral LEs was ordered and showed acute DVTs in bilateral lower extremities, both iliac and femoral veins.  -heparin infusion for treatment  -vascular surgery consulted for IVC filter placement

## 2019-07-17 NOTE — SUBJECTIVE & OBJECTIVE
"Medications Prior to Admission   Medication Sig Dispense Refill Last Dose    aspirin (ECOTRIN) 81 MG EC tablet Take 1 tablet (81 mg total) by mouth once daily. 30 tablet 12 7/6/2019    atorvastatin (LIPITOR) 40 MG tablet Take 40 mg by mouth every evening.    7/5/2019    clopidogrel (PLAVIX) 75 mg tablet Take 1 tablet (75 mg total) by mouth once daily. 30 tablet 11 7/6/2019    ergocalciferol (ERGOCALCIFEROL) 50,000 unit Cap Take 50,000 Units by mouth every 7 days. Sunday   Past Week    famotidine (PEPCID) 20 MG tablet Take 1 tablet (20 mg total) by mouth nightly as needed for Heartburn. 60 tablet 0 7/6/2019    ferrous sulfate 325 (65 FE) MG EC tablet Take 1 tablet (325 mg total) by mouth 2 (two) times daily. (Patient taking differently: Take 325 mg by mouth once daily. )  0 7/6/2019    gabapentin (NEURONTIN) 300 MG capsule Take 1 capsule (300 mg total) by mouth 2 (two) times daily. 60 capsule 2 7/6/2019    levoFLOXacin (LEVAQUIN) 250 MG tablet Take 1 tablet (250 mg total) by mouth once daily. 10 tablet 0 7/6/2019    magnesium oxide (MAG-OX) 400 mg (241.3 mg magnesium) tablet Take 1 tablet (400 mg total) by mouth 3 (three) times daily.  0 7/6/2019    midodrine (PROAMATINE) 10 MG tablet One to two tab po q 8 hours. May take dose during dialysis if bp drops below SBP 90. (Patient taking differently: 20 mg 3 (three) times daily. May take dose during dialysis if bp drops below SBP 90.) 200 tablet 3 7/6/2019    mupirocin (BACTROBAN) 2 % ointment Apply topically 2 (two) times daily. 22 g 1 Taking    ondansetron (ZOFRAN-ODT) 4 MG TbDL Take 2 tablets (8 mg total) by mouth every 8 (eight) hours as needed. 12 tablet 0 Taking    ONETOUCH VERIO Strp Use 1 strip 3 times daily to test Blood sugar  3 Taking    pen needle, diabetic 31 gauge x 3/16" Ndle 1 each by Misc.(Non-Drug; Combo Route) route 4 (four) times daily before meals and nightly. 100 each 5     sevelamer carbonate (RENVELA) 800 mg Tab Take 1 tablet (800 " mg total) by mouth 3 (three) times daily with meals. 90 tablet 11 2019    vitamin renal formula, B-complex-vitamin c-folic acid, (B COMPLEX-C-FOLIC ACID) 1 mg Cap Take 1 capsule by mouth once daily. 1 Capsule Oral Every day   2019    insulin aspart U-100 (NOVOLOG) 100 unit/mL (3 mL) InPn pen Inject 0-5 Units into the skin before meals and at bedtime as needed (Hyperglycemia). 6 mL 2 More than a month       Review of patient's allergies indicates:   Allergen Reactions    Flagyl [metronidazole hcl] Nausea And Vomiting    Clindamycin Diarrhea       Past Medical History:   Diagnosis Date    Abnormal finding on Pap smear, HPV DNA positive     Anemia associated with chronic renal failure     on Epogen    Blood type B+     Bulging discs - symptomatic      CAD (coronary artery disease)     Cardiomyopathy 3/13/2019    Diabetes mellitus, type 2     ESRD (end stage renal disease) 2004    FSGS (focal segmental glomerulosclerosis)     with collapsing    Hyperlipidemia     Hypertension     Neuropathy     NSTEMI (non-ST elevated myocardial infarction) 3/29/2019    Obesity     Secondary hyperparathyroidism, renal     TIA (transient ischemic attack)     Uterine fibroid     small uterine      Past Surgical History:   Procedure Laterality Date    ANGIOGRAM, CORONARY ARTERY N/A 4/15/2019    Performed by Roland [facility]ier MD at [facility] CATH LAB    APPLICATION, EXTERNAL FIXATION DEVICE, LARGE, TIBIA- SYNTHES Right 2019    Performed by Corwin Lopez MD at [facility] OR 2ND FLR    CARDIAC CATHETERIZATION      PCI x 2     SECTION, CLASSIC      COLONOSCOPY N/A 2018    Performed by Rodrigue Russell MD at [facility] ENDO (2ND FLR)    DEBRIDEMENT-WOUND Left 2016    Performed by [facility]ressa Maddox MD at [facility] OR    DIALYSIS FISTULA CREATION      multiple fistulas and grafts before PD     EGD (ESOPHAGOGASTRODUODENOSCOPY) N/A 3/14/2019    [facility]formed by [facility] at [facility] ENDO     EGD (ESOPHAGOGASTRODUODENOSCOPY) N/A 3/13/2019    Performed by Adolph Davila MD at [facility] ENDO    INCISION AND DRAINAGE (I&D), LABIAL N/A 4/17/2016    Performed by Harmony Fernandez MD at [facility] [place] OR    INCISION AND DRAINAGE (I&D), LABIAL (ADD ON) Left 4/18/2016    Performed by Teressa Maddox MD at [facility] [place] OR    INCISION AND DRAINAGE OF WOUND Left 2016    VULVAR ABCESS WITH NECROSIS    Insertion, Catheter, Central Venous, Hemodialysis N/A 3/28/2019    Performed by Kimo Weeks MD at [facility] CATH LAB    Insertion, Catheter, Central Venous, Hemodialysis N/A 3/18/2019    Performed by Kimo Weeks MD at [facility] CATH LAB    INSERTION, CATHETER, TUNNELED ABORTED Left 3/14/2019    Performed by Servando Solis MD at [facility] OR    INSERTION, GRAFT, ARTERIOVENOUS, RIGHT ARM Right 3/22/2019    Performed by BESSIE Wynn III, MD at [facility] OR 2ND FLR    INSERTION, INTRA-AORTIC BALLOON PUMP  4/15/2019    Performed by Roland Napier MD at [facility] CATH LAB    Left heart cath Left 4/15/2019    Performed by Roland Napier MD at [facility] CATH LAB    ORIF, FRACTURE, TIBIA, PLATEAU Right 7/11/2019    Performed by Dominick Desai MD at [facility] OR 2ND FLR    ORIF, HIP Left 9/13/2018    Performed by Tevin Grullon MD at [facility] [place] OR    PERITONEAL CATHETER INSERTION      PERMCATH REWIRE- TUNNELED CATH REWIRE Left 11/13/2017    Performed by Baldev Munroe MD at [facility] [place] CATH LAB    PERMCATH REWIRE- TUNNELED CATH REWIRE N/A 10/5/2017    Performed by Baldev Munroe MD at [facility] [place] CATH LAB    REMOVAL, CATHETER, DIALYSIS, PERITONEAL N/A 3/14/2019    Performed by Servando Solis MD at [facility] OR    REMOVAL, EXTERNAL FIXATION DEVICE Right 7/11/2019    Performed by Dominick Desai MD at [facility] OR 2ND FLR    REPLACEMENT, CATHETER, DIALYSIS, OVER GUIDEWIRE, USING EXISTING VENOUS ACCESS N/A 6/27/2019    Performed by Kimo Weeks MD at [facility] CATH LAB    REPLACEMENT, CATHETER, DIALYSIS, OVER GUIDEWIRE, USING EXISTING  VENOUS ACCESS Left 5/24/2019    Performed by [facility]roe [facility] at [facility] CATH LAB    UMBILICAL HERNIA REPAIR      Venogram, possible intervention Right 3/20/2019    Performed by BESSIE Wynn III, MD at [facility] CATH LAB     Family History     Problem Relation (Age of Onset)    Cancer Maternal Grandmother, Paternal Grandfather    Diabetes Maternal Aunt, Paternal Aunt        Tobacco Use    Smoking status: Never Smoker    Smokeless tobacco: Never Used   Substance and Sexual Activity    Alcohol use: No     Comment: Pt reports some social use of about 1-2 drinks about every six months.    Drug use: No    Sexual activity: Not Currently     Partners: Male     Birth control/protection: None     Review of Systems   Constitutional: Negative for chills and fever.   HENT: Negative for sore throat.    Respiratory: Negative for shortness of breath and wheezing.    Cardiovascular: Positive for leg swelling. Negative for chest pain.   Gastrointestinal: Positive for abdominal distention. Negative for abdominal pain.   Genitourinary: Negative for decreased urine volume.   Musculoskeletal: Positive for joint swelling.        Complaining of bilateral leg pain   Neurological: Negative for dizziness and light-headedness.   Hematological: Negative for adenopathy. Does not bruise/bleed easily.     Objective:     Vital Signs (Most Recent):  Temp: 99 °F (37.2 °C) (07/17/19 1621)  Pulse: (!) 111 (07/17/19 1621)  Resp: 18 (07/17/19 1621)  BP: (!) 106/58 (07/17/19 1621)  SpO2: 96 % (07/17/19 1621) Vital Signs (24h Range):  Temp:  [96.8 °F (36 °C)-99 °F (37.2 °C)] 99 °F (37.2 °C)  Pulse:  [] 111  Resp:  [16-20] 18  SpO2:  [94 %-100 %] 96 %  BP: ()/(41-58) 106/58     Weight: 90.3 kg (199 lb 1.2 oz)  Body mass index is 31.18 kg/m².    Physical Exam   Constitutional: She is oriented to person, place, and time. She appears well-developed and well-nourished. No distress.   HENT:   Head: Normocephalic and atraumatic.   Right Ear:  External ear normal.   Left Ear: External ear normal.   Nose: Nose normal.   Eyes: Right eye exhibits no discharge. Left eye exhibits no discharge. No scleral icterus.   Neck: Normal range of motion.   Cardiovascular: Normal rate and intact distal pulses.   Triphasic DP pulses bilaterally   Pulmonary/Chest: Effort normal. No respiratory distress. She has no wheezes.   Abdominal: Soft. She exhibits distension. There is no tenderness.   Musculoskeletal: She exhibits edema (RLE) and tenderness (RLE).   RLE with dressing CDI  RUE with evidence of old AVG, no palpable thrill or bruit on auscultation   Neurological: She is alert and oriented to person, place, and time. She exhibits normal muscle tone.   Skin: Skin is warm and dry. There is pallor.   Psychiatric: She has a normal mood and affect. Her behavior is normal. Thought content normal.   Vitals reviewed.      Significant Labs:  CBC:   Recent Labs   Lab 07/17/19  1144   WBC 20.91*   RBC 2.23*   HGB 7.0*   HCT 23.3*      *   MCH 31.4*   MCHC 30.0*     CMP:   Recent Labs   Lab 07/17/19  0520   *   CALCIUM 10.7*   ALBUMIN 2.2*   *   K 3.7   CO2 22*   CL 90*   BUN 25*   CREATININE 3.8*     Coagulation:   Recent Labs   Lab 07/17/19  0950   LABPROT 11.8   INR 1.2   APTT 26.8       Significant Diagnostics:  I have reviewed all pertinent imaging results/findings within the past 24 hours.

## 2019-07-17 NOTE — CONSULTS
Inpatient consult to Physical Medicine Rehab  Consult performed by: PAOLO Benitez  Consult ordered by: Fiordaliza Llamas MD  Reason for consult: rehab evaluation      Consult received.  Reviewed patient history and current admission.  Rehab team following.  Full consult to follow.    BERNICE Dominguez, SEUNP-C  Physical Medicine & Rehabilitation   07/17/2019  Spectralink: [id number]

## 2019-07-17 NOTE — NURSING
Paged Med Team 2, spoke with Dr Kev Villegas  notified him patient restless, continues with confusion and delayed responses, notified him family concerned.  Dr Angulo will come and evaluate patient.

## 2019-07-17 NOTE — SUBJECTIVE & OBJECTIVE
Past Medical History:   Diagnosis Date    Abnormal finding on Pap smear, HPV DNA positive     Anemia associated with chronic renal failure     on Epogen    Blood type B+     Bulging discs - symptomatic      CAD (coronary artery disease)     Cardiomyopathy 3/13/2019    Diabetes mellitus, type 2     ESRD (end stage renal disease) 2004    FSGS (focal segmental glomerulosclerosis)     with collapsing    Hyperlipidemia     Hypertension     Neuropathy     NSTEMI (non-ST elevated myocardial infarction) 3/29/2019    Obesity     Secondary hyperparathyroidism, renal     TIA (transient ischemic attack)     Uterine fibroid     small uterine      Past Surgical History:   Procedure Laterality Date    ANGIOGRAM, CORONARY ARTERY N/A 4/15/2019    Performed by Roland Napier MD at [facility] CATH LAB    APPLICATION, EXTERNAL FIXATION DEVICE, LARGE, TIBIA- SYNTHES Right 2019    Performed by Corwin Lopez MD at [facility] OR 2ND FLR    CARDIAC CATHETERIZATION      PCI x 2     SECTION, CLASSIC      COLONOSCOPY N/A 2018    Performed by Rodrigue Russell MD at [facility] ENDO (2ND FLR)    DEBRIDEMENT-WOUND Left 2016    Performed by Teressa Maddox MD at [facility] OR    DIALYSIS FISTULA CREATION      multiple fistulas and grafts before PD     EGD (ESOPHAGOGASTRODUODENOSCOPY) N/A 3/14/2019    Performed by Adolph Davila MD at [facility] ENDO    EGD (ESOPHAGOGASTRODUODENOSCOPY) N/A 3/13/2019    Performed by Adolph Davila MD at [facility] ENDO    INCISION AND DRAINAGE (I&D), LABIAL N/A 2016    Performed by Harmony Fernandez MD at [facility] OR    INCISION AND DRAINAGE (I&D), LABIAL (ADD ON) Left 2016    Performed by Teressa Maddox MD at [facility] OR    INCISION AND DRAINAGE OF WOUND Left     VULVAR ABCESS WITH NECROSIS    Insertion, Catheter, Central Venous, Hemodialysis N/A 3/28/2019    Performed by Kimo Weeks MD at [facility] CATH LAB    Insertion, Catheter, Central Venous,  Hemodialysis N/A 3/18/2019    Performed by Kimo Weeks MD at [facility] CATH LAB    INSERTION, CATHETER, TUNNELED ABORTED Left 3/14/2019    Performed by Servando Solis MD at [facility] OR    INSERTION, GRAFT, ARTERIOVENOUS, RIGHT ARM Right 3/22/2019    Performed by BESSIE Wynn III, MD at [facility] OR 2ND FLR    INSERTION, INTRA-AORTIC BALLOON PUMP  4/15/2019    Performed by Roland Napier MD at [facility] CATH LAB    Left heart cath Left 4/15/2019    Performed by Roland Napier MD at [facility] CATH LAB    ORIF, FRACTURE, TIBIA, PLATEAU Right 7/11/2019    Performed by Dominick Desai MD at [facility] OR 2ND FLR    ORIF, HIP Left 9/13/2018    Performed by Tevin Grullon MD at [facility] OR    PERITONEAL CATHETER INSERTION      PERMCATH REWIRE- TUNNELED CATH REWIRE Left 11/13/2017    Performed by Baldev Munroe MD at [facility] CATH LAB    PERMCATH REWIRE- TUNNELED CATH REWIRE N/A 10/5/2017    Performed by Baldev Munroe MD at [facility] CATH LAB    REMOVAL, CATHETER, DIALYSIS, PERITONEAL N/A 3/14/2019    Performed by Servando Solis MD at [facility] OR    REMOVAL, EXTERNAL FIXATION DEVICE Right 7/11/2019    Performed by Dominick Desai MD at [facility] OR 2ND FLR    REPLACEMENT, CATHETER, DIALYSIS, OVER GUIDEWIRE, USING EXISTING VENOUS ACCESS N/A 6/27/2019    Performed by Kimo Weeks MD at [facility] CATH LAB    REPLACEMENT, CATHETER, DIALYSIS, OVER GUIDEWIRE, USING EXISTING VENOUS ACCESS Left 5/24/2019    Performed by Baldev Munroe MD at [facility] CATH LAB    UMBILICAL HERNIA REPAIR      Venogram, possible intervention Right 3/20/2019    Performed by BESSIE Wynn III, MD at [facility] CATH LAB     Review of patient's allergies indicates:   Allergen Reactions    Flagyl [metronidazole hcl] Nausea And Vomiting    Clindamycin Diarrhea       Scheduled Medications:    [START ON 7/18/2019] sodium chloride 0.9%   Intravenous Once    acetaminophen  650 mg Oral Q6H    atorvastatin  40 mg Oral QHS    epoetin adonay-ebpx (RETACRIT)  injection  5,800 Units Intravenous Every Mon, Wed, Fri    ferrous sulfate  325 mg Oral Daily    gabapentin  300 mg Oral BID    heparin (PORCINE)  80 Units/kg (Adjusted) Intravenous Once    midodrine  15 mg Oral TID    pantoprazole  40 mg Oral Daily    polyethylene glycol  17 g Oral Daily    senna  8.6 mg Oral QHS    sevelamer carbonate  800 mg Oral TID WM    vitamin renal formula (B-complex-vitamin c-folic acid)  1 capsule Oral Daily       PRN Medications: sodium chloride, sodium chloride, sodium chloride 0.9%, bisacodyl, Dextrose 10% Bolus, Dextrose 10% Bolus, famotidine, glucagon (human recombinant), glucose, glucose, heparin (porcine), heparin (PORCINE), heparin (PORCINE), insulin aspart U-100, midodrine, midodrine, ondansetron, oxyCODONE, oxyCODONE, promethazine (PHENERGAN) IVPB, sodium chloride 0.9%    Family History     Problem Relation (Age of Onset)    Cancer Maternal Grandmother, Paternal Grandfather    Diabetes Maternal Aunt, Paternal Aunt        Tobacco Use    Smoking status: Never Smoker    Smokeless tobacco: Never Used   Substance and Sexual Activity    Alcohol use: No     Comment: Pt reports some social use of about 1-2 drinks about every six months.    Drug use: No    Sexual activity: Not Currently     Partners: Male     Birth control/protection: None     Review of Systems   Constitutional: Positive for activity change and fatigue. Negative for chills and fever.   HENT: Negative for drooling, hearing loss, trouble swallowing and voice change.    Eyes: Negative for pain and visual disturbance.   Respiratory: Negative for cough, shortness of breath and wheezing.    Cardiovascular: Negative for chest pain and palpitations.   Gastrointestinal: Positive for abdominal distention. Negative for nausea and vomiting.   Genitourinary: Positive for difficulty urinating. Negative for flank pain.   Musculoskeletal: Positive for arthralgias, back pain, gait problem and myalgias.   Skin: Positive for  wound. Negative for rash.   Neurological: Positive for light-headedness and numbness. Negative for dizziness and headaches.   Psychiatric/Behavioral: Negative for agitation and hallucinations. The patient is not nervous/anxious.      Objective:     Vital Signs (Most Recent):  Temp: 98.6 °F (37 °C) (07/17/19 1117)  Pulse: 94 (07/17/19 1117)  Resp: 16 (07/17/19 0751)  BP: (!) 98/53 (07/17/19 1117)  SpO2: 95 % (07/17/19 1117)    Vital Signs (24h Range):  Temp:  [97.4 °F (36.3 °C)-98.8 °F (37.1 °C)] 98.6 °F (37 °C)  Pulse:  [] 94  Resp:  [16-20] 16  SpO2:  [94 %-98 %] 95 %  BP: ()/(41-56) 98/53     Body mass index is 31.18 kg/m².    Physical Exam   Constitutional: She is oriented to person, place, and time. She appears well-developed and well-nourished. No distress.   HENT:   Head: Normocephalic and atraumatic.   Right Ear: External ear normal.   Left Ear: External ear normal.   Nose: Nose normal.   Eyes: Right eye exhibits no discharge. Left eye exhibits no discharge. No scleral icterus.   Neck: Normal range of motion.   Cardiovascular: Normal rate and intact distal pulses.   Pulmonary/Chest: Effort normal. No respiratory distress. She has no wheezes.   Abdominal: Soft. She exhibits distension. There is no tenderness.   Musculoskeletal: She exhibits edema (RLE) and tenderness (RLE).   RLE with dressing CDI   Neurological: She is alert and oriented to person, place, and time. She exhibits normal muscle tone.   Skin: Skin is warm and dry. There is pallor.   Psychiatric: She has a normal mood and affect. Her behavior is normal. Thought content normal.   Vitals reviewed.    NEUROLOGICAL EXAMINATION:     MENTAL STATUS   Oriented to person, place, and time.       Diagnostic Results:   Labs: Reviewed  X-Ray: Reviewed  US: Reviewed  CT: Reviewed

## 2019-07-17 NOTE — SUBJECTIVE & OBJECTIVE
Review of Systems   Constitutional: Negative for chills and fever.   HENT: Negative for sore throat.    Respiratory: Negative for shortness of breath and wheezing.    Cardiovascular: Positive for leg swelling. Negative for chest pain.   Gastrointestinal: Positive for abdominal distention. Negative for abdominal pain.   Genitourinary: Negative for decreased urine volume.   Musculoskeletal: Positive for joint swelling.        Complaining of bilateral leg pain   Neurological: Negative for dizziness and light-headedness.   Hematological: Negative for adenopathy. Does not bruise/bleed easily.     Objective:     Vital Signs (Most Recent):  Temp: 99 °F (37.2 °C) (07/17/19 1621)  Pulse: (!) 111 (07/17/19 1621)  Resp: 18 (07/17/19 1621)  BP: (!) 106/58 (07/17/19 1621)  SpO2: 96 % (07/17/19 1621) Vital Signs (24h Range):  Temp:  [96.8 °F (36 °C)-99 °F (37.2 °C)] 99 °F (37.2 °C)  Pulse:  [] 111  Resp:  [16-20] 18  SpO2:  [94 %-100 %] 96 %  BP: ()/(41-58) 106/58     Weight: 90.3 kg (199 lb 1.2 oz)  Body mass index is 31.18 kg/m².    Intake/Output Summary (Last 24 hours) at 7/17/2019 1623  Last data filed at 7/17/2019 0600  Gross per 24 hour   Intake 410 ml   Output --   Net 410 ml      Physical Exam   Constitutional: She is oriented to person, place, and time. She appears well-developed and well-nourished. No distress.   HENT:   Head: Normocephalic and atraumatic.   Eyes: Conjunctivae are normal.   Neck: Normal range of motion.   Cardiovascular: Normal rate, regular rhythm and normal heart sounds.   Pulmonary/Chest: Effort normal and breath sounds normal. No respiratory distress.   Abdominal: Soft. She exhibits distension. There is no tenderness.   Musculoskeletal:   Bandage in place over RLE. Bandage with blood and gauze has not been changed since the procedure. Swelling of RLE. Improved motor and sensory function of RLE after discontinuation of nerve block. Patient complaining of bilateral leg pain on exam this  morning. L calf is tender to touch.   Neurological: She is alert and oriented to person, place, and time.   Skin: No rash noted.       Significant Labs: All pertinent labs within the past 24 hours have been reviewed.    Significant Imaging: I have reviewed all pertinent imaging results/findings within the past 24 hours.

## 2019-07-17 NOTE — PLAN OF CARE
Problem: Adult Inpatient Plan of Care  Goal: Plan of Care Review  Patient AAOX4, however, having increased confusion at times but easily reoriented, also patient has a more withdrawn appearance and verbal delays with answers and understanding what is being discussed with patient, sister is concerned regarding this, MD team paged, and [facility] team requested for eval, REBECCA Grant notified and updated throughout shift on patient.  IV Heparin drip started this shift as patient has bilateral thrombus in lower extremities.  Also currently 1 unit RBCs being infused for HGB of 7.  Patient appetite is extremely poor and patient not wanting to eat.  Also patient mobility and strength is decreased and patient unable to support self to sit at edge of bed with out assistance and unable to be left unattended while sitting up.  Unable to collect urine or stool specimen.  Patient continues to decline services or medications at times.  Patient remains free from falls and safety maintained this shift.

## 2019-07-17 NOTE — PROGRESS NOTES
[facility] Hospital Medicine  Progress Note    Patient Name: Jenni Todd  MRN: [id number]  Patient Class: IP- Inpatient   Admission Date: 7/6/2019  Length of Stay: 6 days  Attending Physician: Fiordaliza Llamas MD  Primary Care Provider: Michael Tan Iii, MD    [facility] Medicine Team: [facility] HOSP MED 2 Kev Villegas MD    Subjective:     Principal Problem:Closed fracture of right tibial plateau      HPI:  Patient is a 47 yo F with PMH of ESRD (on HD MWF), DM2, neuropathy, HTN, HLD, CAD, obesity, bulging discs in back who presented to the ED via ambulance following a fall. Patient was walking down the steps in front of her house and fell down and twisted her R leg. She described her leg pain as a 10/10. Last night, while at dialysis, her nurse could not aspirate her R femoral line, and she was instructed to come to the ER due to a clot in her line. She was on her way out of her house to come to the ER when she fell. At baseline, she uses a walker to get around due to her neuropathy in her feet and has a history of falls. In 2018, she fractured her hip and was operated on by Dr. Grullon.    She has had an extensive history for recurrent vascular access problems. She was on peritoneal dialysis for 14 years prior to February 2019, at which point she developed peritonitis, was admitted, and had her catheter removed and switched to HD. 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 Her last dialysis was on Wednesday, 07/03. She plans for a peritoneal dialysis catheter placement on July 25th with Dr. Tucker at [facility].    In the ED, XRs of her pelvis, knee, femur, tibia, and ankle of her R leg were ordered. R tibia XR revealed a tibial fracture and signs of osteopenia. Ortho and Nephrology were consulted. Her ASA and plavix were held, she was splinted, and Ortho planned for an ex-fix today, which was cleared  by Nephrology.           Overview/Hospital Course:  Patient was admitted to the floor on 07/06 with a R tibial fracture and a clotted R femoral access line. Orthopedic Surgery and Nephrology were consulted from the ED. Ortho operated on the patient on 07/06 and did an ex-fix with plans for definitive correction surgery at a later date. On 07/07, Nephrology flushed alteplase into her access line, and she is scheduled for dialysis treatment. Her pain is well-controlled with Tylenol and Oxycodone with Dilaudid available for breakthrough pain. Her home medications were resumed upon admission, except her midodrine was held prior to surgery due to concerns for falling BPs. On 07/08, patient underwent HD and 1L of fluid was removed. On 07/09, Ortho evaluated the swelling of her R knee, and decided to not operate. On 07/10, patient went to HD at which 1.5L of fluid was removed. Her BP dropped to 81/56 following dialysis, and a dose of midodrine 10 mg was ordered. 07/11, patient underwent definitive surgery with Ortho. She tolerated the procedure well and her pain is well-controlled with a ropivacaine nerve block as well as Oxycodone PRN. On 07/12, patient underwent hemodialysis at which 2L was removed over 3.5 hours, and she tolerated it well. PT and OT were consulted for post-operative evaluation.  Patient endorsed inability to move her right toes and ankle with intact pulses and sensation. Anesthesia was informed, and her Ropivacaine nerve block was temporarily discontinued. Her right toes and ankle were reassesed following discontinuing her nerve block, and she was noted to have improved motor and sensory function. Anesthesia resumed her nerve block following examination. On 07/15, patient underwent dialysis, and did not tolerate it well. Only 0.5 L were pulled off out of the planned 1 L due to the patient developing symptomatic hypotension. A CXR was obtained which showed no acute processes. On 07/16, her blood  pressures still remained low so her Midodrine was changed to 15 mg TID, with 10 mg Midodrine PRN to if systolic blood pressure <90. Also, midodrine 20 mg PRN 30 minutes prior to HD was ordered. Her nerve block was discontinued indefinitely and her motor and sensory function of her RLE improved. OT and PT worked with her and recommended SNF placement upon discharge. She underwent dialysis for 3.5h and had 0.5L removed with stable vital signs throughout. Patient refused heparin 5k sq for prophylactic VTE - she was counseled extensively about its risks and benefits, however, has continued to refuse throughout her admission. 07/17, patient developed lightheadedness when trying to sit up. Her blood pressure was checked and found to be 75/45, and she was given midodrine 15 mg with good response. She also started to complain of bilateral leg pain on interview in the morning. She has been refusing heparin due to a past experience with a GI bleed throughout this admission. US bilateral lower extremity was ordered which revealed acute thrombi in her bilateral illiac and femoral veins. She was agreeable to start a heparin infusion for treatment, and a consult was placed to Vascular Surgery for evaluation of IVC filter placement. Her Hb was 7 on morning labs, a decrease from 9.7 from 3 days prior. She was transfused 1 unit pRBC.     Interval History:  NAEO. Patient trying to sit up with difficulty this morning - developed lightheadedness and dizziness and felt like she was going to pass out. Complains of bilateral leg pain on morning interview. Still denying heparin for prophylaxis DVT due to past experience with GI bleed.      Review of Systems   Constitutional: Negative for chills and fever.   HENT: Negative for sore throat.    Respiratory: Negative for shortness of breath and wheezing.    Cardiovascular: Positive for leg swelling. Negative for chest pain.   Gastrointestinal: Positive for abdominal distention. Negative for  abdominal pain.   Genitourinary: Negative for decreased urine volume.   Musculoskeletal: Positive for joint swelling.        Complaining of bilateral leg pain   Neurological: Negative for dizziness and light-headedness.   Hematological: Negative for adenopathy. Does not bruise/bleed easily.     Objective:     Vital Signs (Most Recent):  Temp: 99 °F (37.2 °C) (07/17/19 1621)  Pulse: (!) 111 (07/17/19 1621)  Resp: 18 (07/17/19 1621)  BP: (!) 106/58 (07/17/19 1621)  SpO2: 96 % (07/17/19 1621) Vital Signs (24h Range):  Temp:  [96.8 °F (36 °C)-99 °F (37.2 °C)] 99 °F (37.2 °C)  Pulse:  [] 111  Resp:  [16-20] 18  SpO2:  [94 %-100 %] 96 %  BP: ()/(41-58) 106/58     Weight: 90.3 kg (199 lb 1.2 oz)  Body mass index is 31.18 kg/m².    Intake/Output Summary (Last 24 hours) at 7/17/2019 1623  Last data filed at 7/17/2019 0600  Gross per 24 hour   Intake 410 ml   Output --   Net 410 ml      Physical Exam   Constitutional: She is oriented to person, place, and time. She appears well-developed and well-nourished. No distress.   HENT:   Head: Normocephalic and atraumatic.   Eyes: Conjunctivae are normal.   Neck: Normal range of motion.   Cardiovascular: Normal rate, regular rhythm and normal heart sounds.   Pulmonary/Chest: Effort normal and breath sounds normal. No respiratory distress.   Abdominal: Soft. She exhibits distension. There is no tenderness.   Musculoskeletal:   Bandage in place over RLE. Bandage with blood and gauze has not been changed since the procedure. Swelling of RLE. Improved motor and sensory function of RLE after discontinuation of nerve block. Patient complaining of bilateral leg pain on exam this morning. L calf is tender to touch.   Neurological: She is alert and oriented to person, place, and time.   Skin: No rash noted.       Significant Labs: All pertinent labs within the past 24 hours have been reviewed.    Significant Imaging: I have reviewed all pertinent imaging results/findings within  the past 24 hours.      Assessment/Plan:      * Closed fracture of right tibial plateau  -07/06, XR showing tibial fracture  -Ortho consulted and following  -PT/OT consulted and following  -07/06, Ortho did ex-fix  -07/11, underwent definitive surgery, ORIF, with Ortho  -07/13: Patient endorsed inability to move Rt foot toes and ankle with intact pulses and sensation  -07/14, nerve block was paused, and motor and sensory function were noted to be improved on exam. Anesthesia resumed nerve block after examination.  -07/16, nerve block discontinued.   -07/17, Pain control via Oxycodone PRNs, de-escalated to 5 mgs  -PT/OT recommended SNF placement    Acute deep vein thrombosis (DVT) of both lower extremities  -07/17, patient complaining of bilateral leg pain  -has refused heparin DVT prophylaxis throughout her admission due to a prior GI bleed  -US bilateral LEs was ordered and showed acute DVTs in bilateral lower extremities, both iliac and femoral veins.  -heparin infusion for treatment  -vascular surgery consulted for IVC filter placement      ESRD (end stage renal disease) on dialysis  -reports a clot in vascular access line noticed at dialysis on 07/05  -Neprhology consulted and following  -strict Is/Os, daily weights, renal diet  -Neprhology flushed line with alteplase on 07/07  -HD on 07/08. Went well with 1L fluid removal. Needed Midodrine once for low BP.  -HD on 07/10. Went well with 1.5L fluid removal. Received Midodrine once for low BP.  -HD on 07/12. Went well with 2.5L fluid removal in 3 hours  -07/15, patient did not tolerate HD well, developed symptomatic hypotension, and HD was stopped after 0.5L of the planned 1L dialysis. CXR was obtained which showed no acute processes.   -HD on 07/15. Went well with 0.5L of fluid removed in 3.5 hours. VSS throughout duration.  -Midodrine changed to 15 mg TID damian, with 10 mg PRN for systolic <90, and 20 mg PRN 30 minutes before HD  -Did not undergo dialysis on  07/17 due to episodes of hypotension likely 2/2 volume depletion and anemia  -500 mL bolus NS  -renal vitamins    At risk for venous thromboembolism (VTE)  -heparin 5k sq q8  -patient continues to refuse      Bulging discs - symptomatic   -gabapentin 300 BID      CAD (coronary artery disease)  -holding home ASA and plavix   Will resume once deemed safe from post procedure bleeding stand point       Hyperlipidemia  -restart home lipitor 40      Anemia in chronic kidney disease, on chronic dialysis  -Iron 325 mg BID  -epoetin 5800 U every MWF with dialysis  -On 07/16, Hb at 7.9, downtrending from 9.7 on 07/14.  -Anemia workup labs ordered  -Folate WNL  -B12 >2000  -Iron 22, TIBC 130, Transferrin 88, Ferritin 5153  -, Haptoglobin 244  -On 07/17, Hb of 7  -transfused 1 unit pRBC  -f/u CBC      Diabetic neuropathy associated with type 2 diabetes mellitus  -History of falls 2/2 lower extremity neuropathy.  -Patient fell while walking down the steps outside of her house  -Resume home gabapentin 300 BID        VTE Risk Mitigation (From admission, onward)        Ordered     heparin 25,000 units in dextrose 5% 250 mL (100 units/mL) infusion HIGH INTENSITY nomogram - [facility]  Continuous      07/17/19 0942     heparin 25,000 units in dextrose 5% (100 units/ml) IV bolus from bag - ADDITIONAL PRN BOLUS - 60 units/kg  As needed (PRN)      07/17/19 0942     heparin 25,000 units in dextrose 5% (100 units/ml) IV bolus from bag - ADDITIONAL PRN BOLUS - 30 units/kg  As needed (PRN)      07/17/19 0942     heparin (porcine) injection 5,000 Units  Every 8 hours      07/09/19 1803     heparin (porcine) injection 5,000 Units  Every 8 hours      07/08/19 1415     heparin (porcine) injection 1,000 Units  As needed (PRN)      07/08/19 1210     Place sequential compression device  Until discontinued      07/06/19 1155                Kev Villegas MD  Department of Hospital Medicine   [facility]

## 2019-07-17 NOTE — ASSESSMENT & PLAN NOTE
-Iron 325 mg BID  -epoetin 5800 U every MWF with dialysis  -On 07/16, Hb at 7.9, downtrending from 9.7 on 07/14.  -Anemia workup labs ordered  -Folate WNL  -B12 >2000  -Iron 22, TIBC 130, Transferrin 88, Ferritin 5153  -, Haptoglobin 244  -On 07/17, Hb of 7  -transfused 1 unit pRBC  -f/u CBC

## 2019-07-17 NOTE — HPI
Jenni Todd is a 48-year-old female with PMHx of obesity, HTN, HLD, DMII, neuropathy, ESRD on HD (MWF), CAD, cardiomyopathy, NSTEMI, TIA, and symptomatic bulging discs.  Patient [place] [facility] [place] after fall with RLE trauma.  On arrival, found to have R tibial fracture.  Ortho consulted, and she underwent ex-fix on 7/6/19 and ORIF on 7/11/19 of R tibial plateau fracture.  Post-op NWB RLE.  Nephrology following for ESRD.  Hospital course complicated by symptomatic hypotension, anemia, and BLE DVT (started on heparin infusion).    Functional History: Patient lives in [place] with her step-daughter in a single story home without steps to enter.  Prior to admission, she was (I)/mod(I) with ADLs (aid 3x per week for bathing) and mod(I) with mobility.  Used rollator for short distances and WC for longer distances.  DME: WC, rollator, BSC.

## 2019-07-18 PROBLEM — A41.9 SEVERE SEPSIS: Status: ACTIVE | Noted: 2019-01-01

## 2019-07-18 PROBLEM — R65.20 SEVERE SEPSIS: Status: ACTIVE | Noted: 2019-01-01

## 2019-07-18 PROBLEM — T80.211A CLABSI (CENTRAL LINE-ASSOCIATED BLOODSTREAM INFECTION): Status: ACTIVE | Noted: 2019-01-01

## 2019-07-18 NOTE — ASSESSMENT & PLAN NOTE
49yo F with PMH of ESRD (on HD MWF via R femoral permacath), DM2, neuropathy, HTN, HLD, CAD, obesity, bulging discs in back who presented to the ED via ambulance following a fall with resultant R tibial fracture s/p ex-fix and definitive repair with Ortho on 7/11, now with acute bilateral DVTs    - Patient seen and examined, labs and imaging reviewed, discussed with staff  - In setting of new onset DVT bilaterally, would recommend anti-coagulation with heparin. Continue to monitor hemoglobin closely given history of prior GI bleed. No current need for DVT filter   - Given only current dialysis access is femoral permacath and she had been dialyzing without issue per this route would leave in place and continue to use for now  - Will order bilateral venous arm study to evaluate for possible alternative for vascular access - she does have extensive history: was on peritoneal dialysis for 14 years prior to February 2019, at which point she developed peritonitis, was admitted at [place], and had her catheter removed and switched to HD. 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 Per earlier notes she may have plans for PD cath placement at [facility] later this month - will follow this up  - Please call with questions or concerns

## 2019-07-18 NOTE — ASSESSMENT & PLAN NOTE
--Discussed case with Nephrology. They recommend transfer to a non-critical care unit that can perform CRRT if needed. She is euvolemic this admission and her exam is not consistent with volume overload (SpO2 100% on room air). There are no acute indications for RRT currently.   --Hypotension appears to be chronic (noted in chart, pt on midodrine prior to admission).   --No current symptoms consistent with blood loss. Recommend continuing heparin gtt and titrating infusion to therapeutic range for DVTs.  --Consider antibiotics if primary team is concerned for sepsis.     --I do not think transfer to the ICU will benefit this patient currently as this does not appear to be an acute process and Ms. Todd does not have an access point to deliver vasopressors (DVTs in bilateral common femoral veins and bilateral IJs).   --Please call back with any acute changes.

## 2019-07-18 NOTE — PROGRESS NOTES
[facility]  Orthopedics  Progress Note    Patient Name: Jenni Todd  MRN: [id number]  Admission Date: 7/6/2019  Hospital Length of Stay: 7 days  Attending Provider: Fiordaliza Llamas MD  Primary Care Provider: Michael Tan Iii, MD  Follow-up For: Procedure(s) (LRB):  ORIF, FRACTURE, TIBIA, PLATEAU (Right)  REMOVAL, EXTERNAL FIXATION DEVICE (Right)    Post-Operative Day: 7 Days Post-Op  Subjective:     Principal Problem:Closed fracture of right tibial plateau    Principal Orthopedic Problem: same    Interval History: Patient seen and examined at bedside. NAEON.  Pain controlled.  No PT/OT yesterday.  Heparin drip for B DVT.    Review of patient's allergies indicates:   Allergen Reactions    Flagyl [metronidazole hcl] Nausea And Vomiting    Clindamycin Diarrhea       Current Facility-Administered Medications   Medication    0.9%  NaCl infusion (for blood administration)    0.9%  NaCl infusion    0.9%  NaCl infusion    acetaminophen tablet 650 mg    atorvastatin tablet 40 mg    bisacodyl suppository 10 mg    dextrose 10% (D10W) Bolus    dextrose 10% (D10W) Bolus    epoetin adonay-epbx injection 5,800 Units    famotidine tablet 20 mg    ferrous sulfate EC tablet 325 mg    gabapentin capsule 300 mg    glucagon (human recombinant) injection 1 mg    glucose chewable tablet 16 g    glucose chewable tablet 24 g    heparin (porcine) injection 1,000 Units    heparin 25,000 units in dextrose 5% (100 units/ml) IV bolus from bag - ADDITIONAL PRN BOLUS - 30 units/kg    heparin 25,000 units in dextrose 5% (100 units/ml) IV bolus from bag - ADDITIONAL PRN BOLUS - 60 units/kg    heparin 25,000 units in dextrose 5% 250 mL (100 units/mL) infusion HIGH INTENSITY nomogram - [facility]    insulin aspart U-100 pen 0-5 Units    midodrine tablet 10 mg    midodrine tablet 15 mg    midodrine tablet 20 mg    ondansetron injection 4 mg    oxyCODONE immediate release tablet 5 mg    pantoprazole EC  "tablet 40 mg    polyethylene glycol packet 17 g    promethazine (PHENERGAN) 6.25 mg in dextrose 5 % 50 mL IVPB    senna tablet 8.6 mg    sevelamer carbonate tablet 800 mg    sodium chloride 0.9% flush 10 mL    vitamin renal formula (B-complex-vitamin c-folic acid) 1 mg per capsule 1 capsule     Objective:     Vital Signs (Most Recent):  Temp: 98.4 °F (36.9 °C) (07/18/19 0431)  Pulse: (!) 114 (07/18/19 0431)  Resp: 18 (07/18/19 0431)  BP: (!) 97/52 (07/18/19 0431)  SpO2: 97 % (07/18/19 0431) Vital Signs (24h Range):  Temp:  [96.8 °F (36 °C)-99 °F (37.2 °C)] 98.4 °F (36.9 °C)  Pulse:  [] 114  Resp:  [16-18] 18  SpO2:  [95 %-100 %] 97 %  BP: ()/(36-58) 97/52     Weight: 90.3 kg (199 lb 1.2 oz)  Height: 5' 7" (170.2 cm)  Body mass index is 31.18 kg/m².        Ortho/SPM Exam     AAOx4  NAD  Reg rate  No increased WOB     MSK:    Stage one right ischial ulcer without drainage or SOI    RLE:  Sensation decreased T/SP/DP  Some motor function T; No dorsiflexion toe/ankle  Compartments soft  Aquacel with 1x1cm area of drainage, no change  Proximal pin site gauze saturated    Significant Labs:   CBC:   Recent Labs   Lab 07/17/19  0520 07/17/19  1144 07/17/19  1836   WBC 20.47* 20.91* 19.26*   HGB 7.0* 7.0* 8.6*   HCT 22.7* 23.3* 28.1*    197 191     CMP:   Recent Labs   Lab 07/17/19  0520   *   K 3.7   CL 90*   CO2 22*   *   BUN 25*   CREATININE 3.8*   CALCIUM 10.7*   ALBUMIN 2.2*   ANIONGAP 21*   EGFRNONAA 13.2*       Significant Imaging: I have reviewed all pertinent imaging results/findings.    Assessment/Plan:     * Closed fracture of right tibial plateau  48 y.o. female POD7 s/p R tibial plateau ORIF    Pain control: multimodal  PT/OT: NWB RLE  DVT PPx: therapeutic heparin drip for DVT, FCDs at all times when not ambulating  Abx: postop Ancef complete  Labs: pending  Drain: none  Wiggins: none    Dispo: Therapeutic heparin drip            Shyam Cohen MD  Orthopedics  [facility]    Attg Note:  I agree with the resident's assessment and plan.    Dominick Desai MD

## 2019-07-18 NOTE — ASSESSMENT & PLAN NOTE
48 y.o. female POD7 s/p R tibial plateau ORIF    Pain control: multimodal  PT/OT: NWB RLE  DVT PPx: therapeutic heparin drip for DVT, FCDs at all times when not ambulating  Abx: postop Ancef complete  Labs: pending  Drain: none  Wiggins: none    Dispo: Therapeutic heparin drip

## 2019-07-18 NOTE — NURSING
Pt c/o shortness of breath. O2 sats WNL. O2 at 2LNC for comfort. MD in call paged, states he will see the pt. Will continue to monitor.

## 2019-07-18 NOTE — PROGRESS NOTES
[facility] Hospital Medicine  Progress Note    Patient Name: Jenni Todd  MRN: [id number]  Patient Class: IP- Inpatient   Admission Date: 7/6/2019  Length of Stay: 7 days  Attending Physician: Fiordaliza Llamas MD  Primary Care Provider: Michael Tan Iii, MD    [facility] Medicine Team: [facility] HOSP MED 2 Kev Villegas MD    Subjective:     Principal Problem:Closed fracture of right tibial plateau      HPI:  Patient is a 49 yo F with PMH of ESRD (on HD MWF), DM2, neuropathy, HTN, HLD, CAD, obesity, bulging discs in back who presented to the ED via ambulance following a fall. Patient was walking down the steps in front of her house and fell down and twisted her R leg. She described her leg pain as a 10/10. Last night, while at dialysis, her nurse could not aspirate her R femoral line, and she was instructed to come to the ER due to a clot in her line. She was on her way out of her house to come to the ER when she fell. At baseline, she uses a walker to get around due to her neuropathy in her feet and has a history of falls. In 2018, she fractured her hip and was operated on by Dr. Grullon.    She has had an extensive history for recurrent vascular access problems. She was on peritoneal dialysis for 14 years prior to February 2019, at which point she developed peritonitis, was admitted, and had her catheter removed and switched to HD. 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 Her last dialysis was on Wednesday, 07/03. She plans for a peritoneal dialysis catheter placement on July 25th with Dr. Tucker at [facility].    In the ED, XRs of her pelvis, knee, femur, tibia, and ankle of her R leg were ordered. R tibia XR revealed a tibial fracture and signs of osteopenia. Ortho and Nephrology were consulted. Her ASA and plavix were held, she was splinted, and Ortho planned for an ex-fix today, which was cleared  by Nephrology.           Overview/Hospital Course:  Patient was admitted to the floor on 07/06 with a R tibial fracture and a clotted R femoral access line. Orthopedic Surgery and Nephrology were consulted from the ED. Ortho operated on the patient on 07/06 and did an ex-fix with plans for definitive correction surgery at a later date. On 07/07, Nephrology flushed alteplase into her access line, and she is scheduled for dialysis treatment. Her pain is well-controlled with Tylenol and Oxycodone with Dilaudid available for breakthrough pain. Her home medications were resumed upon admission, except her midodrine was held prior to surgery due to concerns for falling BPs. On 07/08, patient underwent HD and 1L of fluid was removed. On 07/09, Ortho evaluated the swelling of her R knee, and decided to not operate. On 07/10, patient went to HD at which 1.5L of fluid was removed. Her BP dropped to 81/56 following dialysis, and a dose of midodrine 10 mg was ordered. 07/11, patient underwent definitive surgery with Ortho. She tolerated the procedure well and her pain is well-controlled with a ropivacaine nerve block as well as Oxycodone PRN. On 07/12, patient underwent hemodialysis at which 2L was removed over 3.5 hours, and she tolerated it well. PT and OT were consulted for post-operative evaluation.  Patient endorsed inability to move her right toes and ankle with intact pulses and sensation. Anesthesia was informed, and her Ropivacaine nerve block was temporarily discontinued. Her right toes and ankle were reassesed following discontinuing her nerve block, and she was noted to have improved motor and sensory function. Anesthesia resumed her nerve block following examination. On 07/15, patient underwent dialysis, and did not tolerate it well. Only 0.5 L were pulled off out of the planned 1 L due to the patient developing symptomatic hypotension. A CXR was obtained which showed no acute processes. On 07/16, her blood  pressures still remained low so her Midodrine was changed to 15 mg TID, with 10 mg Midodrine PRN to if systolic blood pressure <90. Also, midodrine 20 mg PRN 30 minutes prior to HD was ordered. Her nerve block was discontinued indefinitely and her motor and sensory function of her RLE improved. OT and PT worked with her and recommended SNF placement upon discharge. She underwent dialysis for 3.5h and had 0.5L removed with stable vital signs throughout. Patient refused heparin 5k sq for prophylactic VTE - she was counseled extensively about its risks and benefits, however, has continued to refuse throughout her admission. 07/17, patient developed lightheadedness when trying to sit up. Her blood pressure was checked and found to be 75/45, and she was given midodrine 15 mg with good response. She also started to complain of bilateral leg pain on interview in the morning. She has been refusing heparin due to a past experience with a GI bleed throughout this admission. US bilateral lower extremity was ordered which revealed acute thrombi in her bilateral illiac and femoral veins. She was agreeable to start a heparin infusion for treatment, and a consult was placed to Vascular Surgery for evaluation of IVC filter placement. Her Hb was 7 on morning labs, a decrease from 9.7 from 3 days prior. She was transfused 1 unit pRBC. On 07/18, vascular surgery recommended against IVC filter placement, instead opting for heparin therapeutic drip. Also, she developed some altered mental status and was more slow in her responses, so a stat CT Head was obtained, which showed no acute processes. Her apixaban and ASA were continued to be held in the setting of heparin drip, as well as PMH of GI bleed. While in the room for morning rounds, her blood pressure was 64/49, and her mentation was slowed, so she was given a 500 mL NS bolus, and critical care was consulted. Her blood pressures responded well to fluid resuscitation. Critical Care  was consulted who evaluated the patient and recommended against ICU placement. Per Nephrology and Critical Care recommendations, she was moved to a step-down unit which is capable of CRRT in case of volume overload due to fluid bolus while missing her scheduled dialysis. Blood cultures were obtained x2 as well as a culture from her line. She was started on IV antibiotics (vancomycin and pip-tazo) for concerns of sepsis secondary to CLAPSI.     Interval History:  NAEO. Patient with slowed responses this morning during interview. Endorses feelings of dizziness and lightheadedness. Denies fevers/chills/chest pain/shortness of breath. Still complains of leg pain.      Review of Systems   Constitutional: Negative for chills and fever.   HENT: Negative for sore throat.    Respiratory: Negative for shortness of breath and wheezing.    Cardiovascular: Positive for leg swelling. Negative for chest pain.   Gastrointestinal: Positive for abdominal distention. Negative for abdominal pain.   Genitourinary: Negative for decreased urine volume.   Musculoskeletal: Positive for joint swelling.        Complaining of bilateral leg pain   Neurological: Negative for dizziness and light-headedness.   Hematological: Negative for adenopathy. Does not bruise/bleed easily.     Objective:     Vital Signs (Most Recent):  Temp: 97.9 °F (36.6 °C) (07/18/19 1131)  Pulse: 108 (07/18/19 1138)  Resp: 20 (07/18/19 1131)  BP: (!) 77/52 (07/18/19 1320)  SpO2: 100 % (07/18/19 1131) Vital Signs (24h Range):  Temp:  [96.8 °F (36 °C)-99 °F (37.2 °C)] 97.9 °F (36.6 °C)  Pulse:  [] 108  Resp:  [18-20] 20  SpO2:  [96 %-100 %] 100 %  BP: ()/(36-64) 77/52     Weight: 90.3 kg (199 lb 1.2 oz)  Body mass index is 31.18 kg/m².    Intake/Output Summary (Last 24 hours) at 7/18/2019 1348  Last data filed at 7/18/2019 0955  Gross per 24 hour   Intake 1041.36 ml   Output --   Net 1041.36 ml      Physical Exam   Constitutional: She is oriented to person,  place, and time. She appears well-developed and well-nourished. No distress.   HENT:   Head: Normocephalic and atraumatic.   Eyes: Conjunctivae are normal.   Neck: Normal range of motion.   Cardiovascular: Normal rate, regular rhythm and normal heart sounds.   Pulmonary/Chest: Effort normal and breath sounds normal. No respiratory distress.   Abdominal: Soft. She exhibits distension. There is no tenderness.   Musculoskeletal:   Bandage in place over RLE. Bandage with blood and gauze has not been changed since the procedure. Swelling of RLE. Improved motor and sensory function of RLE after discontinuation of nerve block. Patient complaining of bilateral leg pain on exam this morning. L calf is tender to touch.   Neurological: She is alert and oriented to person, place, and time.   Slowed responses while answering questions. Mentation seems to have declined from yesterday.   Skin: No rash noted.       Significant Labs: All pertinent labs within the past 24 hours have been reviewed.    Significant Imaging: I have reviewed all pertinent imaging results/findings within the past 24 hours.      Assessment/Plan:      * Closed fracture of right tibial plateau  -07/06, XR showing tibial fracture  -Ortho consulted and following  -PT/OT consulted and following  -07/06, Ortho did ex-fix  -07/11, underwent definitive surgery, ORIF, with Ortho  -07/13: Patient endorsed inability to move Rt foot toes and ankle with intact pulses and sensation  -07/14, nerve block was paused, and motor and sensory function were noted to be improved on exam. Anesthesia resumed nerve block after examination.  -07/16, nerve block discontinued.   -07/17, Pain control via Oxycodone PRNs, de-escalated to 5 mgs  -PT/OT recommended SNF placement    CLABSI (central line-associated bloodstream infection)  - Performed passive leg raise with improvement in mentation & BP  - Ordered 500 mL IV normal saline bolus  - lactate  - blood cultures peripheral and from  line  - TTE  - hold sedating agents including gabapentin  - continue heparin drip   - vancomycin load, Pharm D consult  - piperacillin-tazobactam eGFR <20 dosing  - appreciate critical care consult  - transfer to step-down unit      Acute deep vein thrombosis (DVT) of both lower extremities  -07/17, patient complaining of bilateral leg pain  -has refused heparin DVT prophylaxis throughout her admission due to a prior GI bleed  -US bilateral LEs was ordered and showed acute DVTs in bilateral lower extremities, both iliac and femoral veins.  -heparin infusion for treatment  -vascular surgery consulted for IVC filter placement      ESRD (end stage renal disease) on dialysis  -reports a clot in vascular access line noticed at dialysis on 07/05  -Neprhology consulted and following  -strict Is/Os, daily weights, renal diet  -Neprhology flushed line with alteplase on 07/07  -HD on 07/08. Went well with 1L fluid removal. Needed Midodrine once for low BP.  -HD on 07/10. Went well with 1.5L fluid removal. Received Midodrine once for low BP.  -HD on 07/12. Went well with 2.5L fluid removal in 3 hours  -07/15, patient did not tolerate HD well, developed symptomatic hypotension, and HD was stopped after 0.5L of the planned 1L dialysis. CXR was obtained which showed no acute processes.   -HD on 07/15. Went well with 0.5L of fluid removed in 3.5 hours. VSS throughout duration.  -Midodrine changed to 15 mg TID scheudled, with 10 mg PRN for systolic <90, and 20 mg PRN 30 minutes before HD  -Did not undergo dialysis on 07/17 due to episodes of hypotension likely 2/2 volume depletion and anemia  -500 mL bolus NS  -renal vitamins    Anemia in chronic kidney disease, on chronic dialysis  -Iron 325 mg BID  -epoetin 5800 U every MWF with dialysis  -On 07/16, Hb at 7.9, downtrending from 9.7 on 07/14.  -Anemia workup labs ordered  -Folate WNL  -B12 >2000  -Iron 22, TIBC 130, Transferrin 88, Ferritin 5153  -, Haptoglobin 244  -On  07/17, Hb of 7  -transfused 1 unit pRBC  -07/18, Hb of 8.6      Bulging discs - symptomatic   -gabapentin 300 BID      CAD (coronary artery disease)  -holding home ASA and plavix   Will resume once deemed safe from post procedure bleeding stand point       Hyperlipidemia  -restart home lipitor 40      Diabetic neuropathy associated with type 2 diabetes mellitus  -History of falls 2/2 lower extremity neuropathy.  -Patient fell while walking down the steps outside of her house  -holding home gabapentin 300 BID due to slowed mentation      VTE Risk Mitigation (From admission, onward)        Ordered     heparin 25,000 units in dextrose 5% 250 mL (100 units/mL) infusion HIGH INTENSITY nomogram - [facility]  Continuous      07/17/19 0942     heparin 25,000 units in dextrose 5% (100 units/ml) IV bolus from bag - ADDITIONAL PRN BOLUS - 60 units/kg  As needed (PRN)      07/17/19 0942     heparin 25,000 units in dextrose 5% (100 units/ml) IV bolus from bag - ADDITIONAL PRN BOLUS - 30 units/kg  As needed (PRN)      07/17/19 0942     heparin (porcine) injection 5,000 Units  Every 8 hours      07/09/19 1803     heparin (porcine) injection 5,000 Units  Every 8 hours      07/08/19 1415     heparin (porcine) injection 1,000 Units  As needed (PRN)      07/08/19 1210     Place sequential compression device  Until discontinued      07/06/19 1272                Kev Villegas MD  Department of Hospital Medicine   [facility]

## 2019-07-18 NOTE — SUBJECTIVE & OBJECTIVE
Past Medical History:   Diagnosis Date    Abnormal finding on Pap smear, HPV DNA positive     Anemia associated with chronic renal failure     on Epogen    Blood type B+     Bulging discs - symptomatic      CAD (coronary artery disease)     Cardiomyopathy 3/13/2019    Diabetes mellitus, type 2     ESRD (end stage renal disease) 2004    FSGS (focal segmental glomerulosclerosis)     with collapsing    Hyperlipidemia     Hypertension     Neuropathy     NSTEMI (non-ST elevated myocardial infarction) 3/29/2019    Obesity     Secondary hyperparathyroidism, renal     TIA (transient ischemic attack)     Uterine fibroid     small uterine        Past Surgical History:   Procedure Laterality Date    ANGIOGRAM, CORONARY ARTERY N/A 4/15/2019    Performed by Roland Napier [facility]    APPLICATION, EXTERNAL FIXATION DEVICE, LARGE, TIBIA- SYNTHES Right 2019    Performed by Corwin Lopez MD at [facility] 2ND FLR    CARDIAC CATHETERIZATION      PCI x 2     SECTION, CLASSIC      COLONOSCOPY N/A 2018    Performed by Rodrigue Russell MD at [facility] (2ND FLR)    DEBRIDEMENT-WOUND Left 2016    Performed by Teressa Maddox MD at [facility] OR    DIALYSIS FISTULA CREATION      multiple fistulas and grafts before PD     EGD (ESOPHAGOGASTRODUODENOSCOPY) N/A 3/14/2019    Performed by Adolph Davila MD at [facility] ENDO    EGD (ESOPHAGOGASTRODUODENOSCOPY) N/A 3/13/2019    Performed by Adolph Davila MD at [facility] ENDO    INCISION AND DRAINAGE (I&D), LABIAL N/A 2016    Performed by Harmony Fernandez MD at [facility] OR    INCISION AND DRAINAGE (I&D), LABIAL (ADD ON) Left 2016    Performed by Teressa Maddox MD at [facility] OR    INCISION AND DRAINAGE OF WOUND Left     VULVAR ABCESS WITH NECROSIS    Insertion, Catheter, Central Venous, Hemodialysis N/A 3/28/2019    Performed by Kimo Weeks [facility] at [facility]    Insertion, Catheter, Central Venous,  Hemodialysis N/A 3/18/2019    Performed by Kimo Weeks MD at [facility] CATH LAB    INSERTION, CATHETER, TUNNELED ABORTED Left 3/14/2019    Performed by Servando Solis MD at [facility] OR    INSERTION, GRAFT, ARTERIOVENOUS, RIGHT ARM Right 3/22/2019    Performed by BESSIE Wynn III, MD at [facility] OR 2ND FLR    INSERTION, INTRA-AORTIC BALLOON PUMP  4/15/2019    Performed by Roland Napier MD at [facility] CATH LAB    Left heart cath Left 4/15/2019    Performed by Roland Napier MD at [facility] CATH LAB    ORIF, FRACTURE, TIBIA, PLATEAU Right 7/11/2019    Performed by Dominick Desai MD at [facility] OR 2ND FLR    ORIF, HIP Left 9/13/2018    Performed by Tevin Grullon MD at [facility] OR    PERITONEAL CATHETER INSERTION      PERMCATH REWIRE- TUNNELED CATH REWIRE Left 11/13/2017    Performed by Baldev Munroe MD at [facility] CATH LAB    PERMCATH REWIRE- TUNNELED CATH REWIRE N/A 10/5/2017    Performed by Baldev Munroe MD at [facility] CATH LAB    REMOVAL, CATHETER, DIALYSIS, PERITONEAL N/A 3/14/2019    Performed by Servando Solis MD at [facility] OR    REMOVAL, EXTERNAL FIXATION DEVICE Right 7/11/2019    Performed by Dominick Desai MD at [facility] OR 2ND FLR    REPLACEMENT, CATHETER, DIALYSIS, OVER GUIDEWIRE, USING EXISTING VENOUS ACCESS N/A 6/27/2019    Performed by Kimo Weeks MD at [facility] CATH LAB    REPLACEMENT, CATHETER, DIALYSIS, OVER GUIDEWIRE, USING EXISTING VENOUS ACCESS Left 5/24/2019    Performed by Baldev Munroe MD at [facility] CATH LAB    UMBILICAL HERNIA REPAIR      Venogram, possible intervention Right 3/20/2019    Performed by BESSIE Wynn III, MD at [facility] CATH LAB       Review of patient's allergies indicates:   Allergen Reactions    Flagyl [metronidazole hcl] Nausea And Vomiting    Clindamycin Diarrhea       Family History     Problem Relation (Age of Onset)    Cancer Maternal Grandmother, Paternal Grandfather    Diabetes Maternal Aunt, Paternal Aunt        Tobacco Use    Smoking status: Never  Smoker    Smokeless tobacco: Never Used   Substance and Sexual Activity    Alcohol use: No     Comment: Pt reports some social use of about 1-2 drinks about every six months.    Drug use: No    Sexual activity: Not Currently     Partners: Male     Birth control/protection: None      Review of Systems   Constitutional: Negative for chills and diaphoresis.   HENT: Negative for postnasal drip.    Eyes: Negative for visual disturbance.   Respiratory: Negative for shortness of breath.    Cardiovascular: Negative for chest pain.   Gastrointestinal: Positive for abdominal distention. Negative for abdominal pain, anal bleeding, blood in stool and constipation.   Genitourinary: Negative for hematuria.   Musculoskeletal: Negative for myalgias.   Skin: Negative for rash.   Neurological: Negative for headaches.   Hematological: Negative for adenopathy.   Psychiatric/Behavioral: Negative for confusion.     Objective:     Vital Signs (Most Recent):  Temp: 98.2 °F (36.8 °C) (07/18/19 0817)  Pulse: 95 (07/18/19 1012)  Resp: 18 (07/18/19 0722)  BP: (!) 84/59 (07/18/19 1012)  SpO2: 97 % (07/18/19 0722) Vital Signs (24h Range):  Temp:  [96.8 °F (36 °C)-99 °F (37.2 °C)] 98.2 °F (36.8 °C)  Pulse:  [] 95  Resp:  [16-18] 18  SpO2:  [95 %-100 %] 97 %  BP: ()/(36-59) 84/59   Weight: 90.3 kg (199 lb 1.2 oz)  Body mass index is 31.18 kg/m².      Intake/Output Summary (Last 24 hours) at 7/18/2019 1106  Last data filed at 7/18/2019 0955  Gross per 24 hour   Intake 1141.36 ml   Output --   Net 1141.36 ml       Physical Exam   Constitutional: She appears well-developed and well-nourished.   HENT:   Head: Normocephalic and atraumatic.   Mouth/Throat: Oropharynx is clear and moist.   Eyes: Pupils are equal, round, and reactive to light. Conjunctivae are normal. Right eye exhibits no discharge. Left eye exhibits no discharge. No scleral icterus.   Neck: Trachea normal, normal range of motion and full passive range of motion without  pain. Neck supple. No JVD present. No tracheal deviation present. No thyromegaly present.   Cardiovascular: Normal rate, regular rhythm, S1 normal, S2 normal, normal heart sounds and intact distal pulses. Exam reveals no gallop and no friction rub.   No murmur heard.  Pulmonary/Chest: Effort normal and breath sounds normal. No respiratory distress. She has no wheezes. She has no rales. She exhibits no tenderness.   Abdominal: Soft. Bowel sounds are normal. She exhibits no distension and no mass. There is no tenderness. There is no rebound and no guarding.   Musculoskeletal: Normal range of motion. She exhibits no tenderness or deformity.   Lymphadenopathy:     She has no cervical adenopathy.   Neurological: No cranial nerve deficit. Coordination normal.   Skin: Skin is warm and dry. No abrasion and no bruising noted.   Psychiatric: She has a normal mood and affect.   Vitals reviewed.      Vents:     Lines/Drains/Airways     Central Venous Catheter Line                 Hemodialysis Catheter right femoral -- days         Percutaneous Central Line Insertion/Assessment - double lumen  06/27/19 1438 right femoral vein;right femoral 20 days          Drain                 Hemodialysis AV Fistula Left upper arm -- days         Hemodialysis AV Graft Right upper arm -- days          Peripheral Intravenous Line                 Peripheral IV - Single Lumen 07/06/19 0823 22 G Right Wrist 12 days         Peripheral IV - Double Lumen 07/11/19 1013 20 G Distal;Left;Posterior Forearm 7 days         Peripheral IV - Single Lumen 07/17/19 1203 20 G;1 3/4 in Right;Anterior Forearm less than 1 day              Significant Labs:    CBC/Anemia Profile:  Recent Labs   Lab 07/17/19  1144 07/17/19  1836 07/18/19  0509   WBC 20.91* 19.26* 19.17*   HGB 7.0* 8.6* 9.0*   HCT 23.3* 28.1* 28.6*    191 230   * 100* 101*   RDW 17.3* 16.5* 16.9*        Chemistries:  Recent Labs   Lab 07/17/19  0520 07/18/19  0509   * 135*   K 3.7  3.7   CL 90* 91*   CO2 22* 19*   BUN 25* 31*   CREATININE 3.8* 4.5*   CALCIUM 10.7* 11.2*   ALBUMIN 2.2* 2.2*   MG 2.2 2.2   PHOS 3.8 3.9       Significant Imaging: I have reviewed and interpreted all pertinent imaging results/findings within the past 24 hours.

## 2019-07-18 NOTE — CARE UPDATE
"RAPID RESPONSE NURSE PROACTIVE ROUNDING NOTE     Time of Visit: 1629    Admit Date: 2019  LOS: 7  Code Status: Full Code   Date of Visit: 2019  : 1970  Age: 48 y.o.  Sex: female  Race: Black or   Bed: 503/503 A:   MRN: [id number]  Was the patient discharged from an ICU this admission? no   Was the patient discharged from a PACU within last 24 hours?  no  Did the patient receive conscious sedation/general anesthesia in last 24 hours?  no  Was the patient in the ED within the past 24 hours?  no  Was the patient started on NIPPV within the past 24 hours?  no  Attending Physician: Fiordaliza Llamas MD  Primary [facility] [facility]    ASSESSMENT     Diagnosis: Closed fracture of right tibial plateau    Abnormal Vital Signs: BP 90/60   Pulse 91   Temp 97.9 °F (36.6 °C) (Oral)   Resp 20   Ht 5' 7" (1.702 m)   Wt 90.3 kg (199 lb 1.2 oz)   LMP 2014 (Approximate)   SpO2 100%   Breastfeeding? No   BMI 31.18 kg/m²      Clinical Issues: Circulatory    Patient  has a past medical history of Abnormal finding on Pap smear, HPV DNA positive, Anemia associated with chronic renal failure, Blood type B+, Bulging discs - symptomatic , CAD (coronary artery disease), Cardiomyopathy, Diabetes mellitus, type 2, ESRD (end stage renal disease), FSGS (focal segmental glomerulosclerosis), Hyperlipidemia, Hypertension, Neuropathy, NSTEMI (non-ST elevated myocardial infarction), Obesity, Secondary hyperparathyroidism, renal, TIA (transient ischemic attack), and Uterine fibroid.    Chart checked pt, last BP noted to be 70/50s. Talked with bedside, CRISTOBAL Yusuf. Pt hypotensive throughout the day. 500 cc bolus given this morning and a dose of midodrine given this afternoon. Pt off floor to ultrasound, bedside, RN unable to reassess pt. RRT down to ultrasound to assess pt. Upon arrival, pt resting comfortably on stretcher. BP retaken, resulted at 97/68 (74). Pt to not receive dialysis today.   "   INTERVENTIONS/ RECOMMENDATIONS     Monitor BP closely.    Discussed plan of care with RNMadelin.    PHYSICIAN ESCALATION     Yes/No  no    Orders received and case discussed with NA.    Disposition: Remain in room 503A.    FOLLOW-UP     Call back the Rapid Response Nurse, Kris Santana RN at [phone] for additional questions or concerns.

## 2019-07-18 NOTE — SUBJECTIVE & OBJECTIVE
Principal Problem:Closed fracture of right tibial plateau    Principal Orthopedic Problem: same    Interval History: Patient seen and examined at bedside. NAEON.  Pain controlled.  No PT/OT yesterday.  Heparin drip for B DVT.    Review of patient's allergies indicates:   Allergen Reactions    Flagyl [metronidazole hcl] Nausea And Vomiting    Clindamycin Diarrhea       Current Facility-Administered Medications   Medication    0.9%  NaCl infusion (for blood administration)    0.9%  NaCl infusion    0.9%  NaCl infusion    acetaminophen tablet 650 mg    atorvastatin tablet 40 mg    bisacodyl suppository 10 mg    dextrose 10% (D10W) Bolus    dextrose 10% (D10W) Bolus    epoetin adonay-epbx injection 5,800 Units    famotidine tablet 20 mg    ferrous sulfate EC tablet 325 mg    gabapentin capsule 300 mg    glucagon (human recombinant) injection 1 mg    glucose chewable tablet 16 g    glucose chewable tablet 24 g    heparin (porcine) injection 1,000 Units    heparin 25,000 units in dextrose 5% (100 units/ml) IV bolus from bag - ADDITIONAL PRN BOLUS - 30 units/kg    heparin 25,000 units in dextrose 5% (100 units/ml) IV bolus from bag - ADDITIONAL PRN BOLUS - 60 units/kg    heparin 25,000 units in dextrose 5% 250 mL (100 units/mL) infusion HIGH INTENSITY nomogram - [facility]    insulin aspart U-100 pen 0-5 Units    midodrine tablet 10 mg    midodrine tablet 15 mg    midodrine tablet 20 mg    ondansetron injection 4 mg    oxyCODONE immediate release tablet 5 mg    pantoprazole EC tablet 40 mg    polyethylene glycol packet 17 g    promethazine (PHENERGAN) 6.25 mg in dextrose 5 % 50 mL IVPB    senna tablet 8.6 mg    sevelamer carbonate tablet 800 mg    sodium chloride 0.9% flush 10 mL    vitamin renal formula (B-complex-vitamin c-folic acid) 1 mg per capsule 1 capsule     Objective:     Vital Signs (Most Recent):  Temp: 98.4 °F (36.9 °C) (07/18/19 0431)  Pulse: (!) 114 (07/18/19 0431)  Resp: 18  "(07/18/19 0431)  BP: (!) 97/52 (07/18/19 0431)  SpO2: 97 % (07/18/19 0431) Vital Signs (24h Range):  Temp:  [96.8 °F (36 °C)-99 °F (37.2 °C)] 98.4 °F (36.9 °C)  Pulse:  [] 114  Resp:  [16-18] 18  SpO2:  [95 %-100 %] 97 %  BP: ()/(36-58) 97/52     Weight: 90.3 kg (199 lb 1.2 oz)  Height: 5' 7" (170.2 cm)  Body mass index is 31.18 kg/m².        Ortho/SPM Exam     AAOx4  NAD  Reg rate  No increased WOB     MSK:    Stage one right ischial ulcer without drainage or SOI    RLE:  Sensation decreased T/SP/DP  Some motor function T; No dorsiflexion toe/ankle  Compartments soft  Aquacel with 1x1cm area of drainage, no change  Proximal pin site gauze saturated    Significant Labs:   CBC:   Recent Labs   Lab 07/17/19  0520 07/17/19  1144 07/17/19  1836   WBC 20.47* 20.91* 19.26*   HGB 7.0* 7.0* 8.6*   HCT 22.7* 23.3* 28.1*    197 191     CMP:   Recent Labs   Lab 07/17/19  0520   *   K 3.7   CL 90*   CO2 22*   *   BUN 25*   CREATININE 3.8*   CALCIUM 10.7*   ALBUMIN 2.2*   ANIONGAP 21*   EGFRNONAA 13.2*       Significant Imaging: I have reviewed all pertinent imaging results/findings.  "

## 2019-07-18 NOTE — SUBJECTIVE & OBJECTIVE
Review of Systems   Constitutional: Negative for chills and fever.   HENT: Negative for sore throat.    Respiratory: Negative for shortness of breath and wheezing.    Cardiovascular: Positive for leg swelling. Negative for chest pain.   Gastrointestinal: Positive for abdominal distention. Negative for abdominal pain.   Genitourinary: Negative for decreased urine volume.   Musculoskeletal: Positive for joint swelling.        Complaining of bilateral leg pain   Neurological: Negative for dizziness and light-headedness.   Hematological: Negative for adenopathy. Does not bruise/bleed easily.     Objective:     Vital Signs (Most Recent):  Temp: 97.9 °F (36.6 °C) (07/18/19 1131)  Pulse: 108 (07/18/19 1138)  Resp: 20 (07/18/19 1131)  BP: (!) 77/52 (07/18/19 1320)  SpO2: 100 % (07/18/19 1131) Vital Signs (24h Range):  Temp:  [96.8 °F (36 °C)-99 °F (37.2 °C)] 97.9 °F (36.6 °C)  Pulse:  [] 108  Resp:  [18-20] 20  SpO2:  [96 %-100 %] 100 %  BP: ()/(36-64) 77/52     Weight: 90.3 kg (199 lb 1.2 oz)  Body mass index is 31.18 kg/m².    Intake/Output Summary (Last 24 hours) at 7/18/2019 1348  Last data filed at 7/18/2019 0955  Gross per 24 hour   Intake 1041.36 ml   Output --   Net 1041.36 ml      Physical Exam   Constitutional: She is oriented to person, place, and time. She appears well-developed and well-nourished. No distress.   HENT:   Head: Normocephalic and atraumatic.   Eyes: Conjunctivae are normal.   Neck: Normal range of motion.   Cardiovascular: Normal rate, regular rhythm and normal heart sounds.   Pulmonary/Chest: Effort normal and breath sounds normal. No respiratory distress.   Abdominal: Soft. She exhibits distension. There is no tenderness.   Musculoskeletal:   Bandage in place over RLE. Bandage with blood and gauze has not been changed since the procedure. Swelling of RLE. Improved motor and sensory function of RLE after discontinuation of nerve block. Patient complaining of bilateral leg pain on  exam this morning. L calf is tender to touch.   Neurological: She is alert and oriented to person, place, and time.   Slowed responses while answering questions. Mentation seems to have declined from yesterday.   Skin: No rash noted.       Significant Labs: All pertinent labs within the past 24 hours have been reviewed.    Significant Imaging: I have reviewed all pertinent imaging results/findings within the past 24 hours.

## 2019-07-18 NOTE — PT/OT/SLP PROGRESS
Physical Therapy      Patient Name:  Jenni Todd   MRN:  [id number]    Patient not seen today secondary to off floor for ultrasound. Will follow-up again at next scheduled visit.    Amy Agarwal, PT

## 2019-07-18 NOTE — PROGRESS NOTES
Received  report that patient was very hypotensive from primary floor nurse. Notified RAJNI Scruggs NP of renal. Dialysis held for now per RAJNI Scruggs,, NP.

## 2019-07-18 NOTE — CONSULTS
[facility]  Vascular Surgery  Consult Note    Inpatient consult to Vascular Surgery  Consult performed by: Jagruti Colón MD  Consult ordered by: Shyam Cohen MD        Subjective:     Chief Complaint/Reason for Admission: New onset bilateral LE DVTs    History of Present Illness: Ms. Todd is a 49 yo F with PMH of ESRD (on HD MWF via R femoral permacath), DM2, neuropathy, HTN, HLD, CAD, obesity, bulging discs in back who presented to the ED via ambulance following a fall with resultant R tibial fracture s/p ex-fix and definitive repair with Ortho on 7/11. At the time of presentation she was also noted to have clotted R femoral access line, which was declotted per nephrology with alteplase. She has been dialyzing via this permacath throughout her stay without significant issues until 7/15 when she became hypotensive requiring increase in midodrine. She has continued to require midodrine since this point. She also was noted to complain of bilateral leg pain and DVT US obtained which demonstrated bilateral DVTs in iliac and femoral veins. Of note she has been refusing her ppx heparin given concern of GI bleed since she has a remote history of GI bleed. Notably her Hb was 7 on morning labs, a decrease from 9.7 from 3 days prior. She was transfused 1 unit pRBC. Vascular surgery was consulted for evaluation for need for possible IVC filter.     Medications Prior to Admission   Medication Sig Dispense Refill Last Dose    aspirin (ECOTRIN) 81 MG EC tablet Take 1 tablet (81 mg total) by mouth once daily. 30 tablet 12 7/6/2019    atorvastatin (LIPITOR) 40 MG tablet Take 40 mg by mouth every evening.    7/5/2019    clopidogrel (PLAVIX) 75 mg tablet Take 1 tablet (75 mg total) by mouth once daily. 30 tablet 11 7/6/2019    ergocalciferol (ERGOCALCIFEROL) 50,000 unit Cap Take 50,000 Units by mouth every 7 days. Sunday   Past Week    famotidine (PEPCID) 20 MG tablet Take 1 tablet (20 mg total) by  "mouth nightly as needed for Heartburn. 60 tablet 0 7/6/2019    ferrous sulfate 325 (65 FE) MG EC tablet Take 1 tablet (325 mg total) by mouth 2 (two) times daily. (Patient taking differently: Take 325 mg by mouth once daily. )  0 7/6/2019    gabapentin (NEURONTIN) 300 MG capsule Take 1 capsule (300 mg total) by mouth 2 (two) times daily. 60 capsule 2 7/6/2019    levoFLOXacin (LEVAQUIN) 250 MG tablet Take 1 tablet (250 mg total) by mouth once daily. 10 tablet 0 7/6/2019    magnesium oxide (MAG-OX) 400 mg (241.3 mg magnesium) tablet Take 1 tablet (400 mg total) by mouth 3 (three) times daily.  0 7/6/2019    midodrine (PROAMATINE) 10 MG tablet One to two tab po q 8 hours. May take dose during dialysis if bp drops below SBP 90. (Patient taking differently: 20 mg 3 (three) times daily. May take dose during dialysis if bp drops below SBP 90.) 200 tablet 3 7/6/2019    mupirocin (BACTROBAN) 2 % ointment Apply topically 2 (two) times daily. 22 g 1 Taking    ondansetron (ZOFRAN-ODT) 4 MG TbDL Take 2 tablets (8 mg total) by mouth every 8 (eight) hours as needed. 12 tablet 0 Taking    ONETOUCH VERIO Strp Use 1 strip 3 times daily to test Blood sugar  3 Taking    pen needle, diabetic 31 gauge x 3/16" Ndle 1 each by Misc.(Non-Drug; Combo Route) route 4 (four) times daily before meals and nightly. 100 each 5     sevelamer carbonate (RENVELA) 800 mg Tab Take 1 tablet (800 mg total) by mouth 3 (three) times daily with meals. 90 tablet 11 7/5/2019    vitamin renal formula, B-complex-vitamin c-folic acid, (B COMPLEX-C-FOLIC ACID) 1 mg Cap Take 1 capsule by mouth once daily. 1 Capsule Oral Every day   7/6/2019    insulin aspart U-100 (NOVOLOG) 100 unit/mL (3 mL) InPn pen Inject 0-5 Units into the skin before meals and at bedtime as needed (Hyperglycemia). 6 mL 2 More than a month       Review of patient's allergies indicates:   Allergen Reactions    Flagyl [metronidazole hcl] Nausea And Vomiting    Clindamycin Diarrhea "       Past Medical History:   Diagnosis Date    Abnormal finding on Pap smear, HPV DNA positive     Anemia associated with chronic renal failure     on Epogen    Blood type B+     Bulging discs - symptomatic      CAD (coronary artery disease)     Cardiomyopathy 3/13/2019    Diabetes mellitus, type 2     ESRD (end stage renal disease) 2004    FSGS (focal segmental glomerulosclerosis)     with collapsing    Hyperlipidemia     Hypertension     Neuropathy     NSTEMI (non-ST elevated myocardial infarction) 3/29/2019    Obesity     Secondary hyperparathyroidism, renal     TIA (transient ischemic attack)     Uterine fibroid     small uterine      Past Surgical History:   Procedure Laterality Date    ANGIOGRAM, CORONARY ARTERY N/A 4/15/2019    Performed by Roland [facility]ier MD at [facility] CATH LAB    APPLICATION, EXTERNAL FIXATION DEVICE, LARGE, TIBIA- SYNTHES Right 2019    Performed by Corwin Lopez MD at [facility] OR 2ND FLR    CARDIAC CATHETERIZATION      PCI x 2     SECTION, CLASSIC      COLONOSCOPY N/A 2018    Performed by Rodrigue Russell MD at [facility] ENDO (2ND FLR)    DEBRIDEMENT-WOUND Left 2016    Performed by Teressa Maddox MD at [facility] OR    DIALYSIS FISTULA CREATION      multiple fistulas and grafts before PD     EGD (ESOPHAGOGASTRODUODENOSCOPY) N/A 3/14/2019    [facility] ENDO    EGD (ESOPHAGOGASTRODUODENOSCOPY) N/A 3/13/2019    Performed by [facility]ila [facility] at [facility] ENDO    INCISION AND DRAINAGE (I&D), LABIAL N/A 2016    Performed by Harmony Fernandez MD at [facility] OR    INCISION AND DRAINAGE (I&D), LABIAL (ADD ON) Left 2016    Performed by Teressa Maddox MD at [facility] OR    INCISION AND DRAINAGE OF WOUND Left     VULVAR ABCESS WITH NECROSIS    Insertion, Catheter, Central Venous, Hemodialysis N/A 3/28/2019    Performed by Kimo Weeks MD at [facility] CATH LAB    Insertion, Catheter, Central Venous,  Hemodialysis N/A 3/18/2019    Performed by [facility]eks MD at [facility] CATH LAB    INSERTION, CATHETER, TUNNELED ABORTED Left 3/14/2019    Performed by [facility]ando Solis [facility] at [facility] OR    INSERTION, GRAFT, ARTERIOVENOUS, RIGHT ARM Right 3/22/2019    Performed by BESSIE Wynn III, MD at [facility] OR 2ND FLR    INSERTION, INTRA-AORTIC BALLOON PUMP  4/15/2019    Performed by Roland Napier MD at [facility] CATH LAB    Left heart cath Left 4/15/2019    Performed by Roland Napier MD at [facility] CATH LAB    ORIF, FRACTURE, TIBIA, PLATEAU Right 7/11/2019    Performed by Dominick Desai MD at [facility] OR 2ND FLR    ORIF, HIP Left 9/13/2018    Performed by Tevin Grullon MD at [facility] OR    PERITONEAL CATHETER INSERTION      PERMCATH REWIRE- TUNNELED CATH REWIRE Left 11/13/2017    Performed by Baldev Munroe MD at [facility] CATH LAB    PERMCATH REWIRE- TUNNELED CATH REWIRE N/A 10/5/2017    Performed by Baldev Munroe MD at [facility] CATH LAB    REMOVAL, CATHETER, DIALYSIS, PERITONEAL N/A 3/14/2019    Performed by Servando Solis MD at [facility] OR    REMOVAL, EXTERNAL FIXATION DEVICE Right 7/11/2019    Performed by Dominick Desai MD at [facility] OR 2ND FLR    REPLACEMENT, CATHETER, DIALYSIS, OVER GUIDEWIRE, USING EXISTING VENOUS ACCESS N/A 6/27/2019    Performed by Kimo Weeks MD at [facility] CATH LAB    REPLACEMENT, CATHETER, DIALYSIS, OVER GUIDEWIRE, USING EXISTING VENOUS ACCESS Left 5/24/2019    Performed by Baldev Munroe MD at [facility] CATH LAB    UMBILICAL HERNIA REPAIR      Venogram, possible intervention Right 3/20/2019    Performed by BESSIE Wynn III, MD at [facility] CATH LAB     Family History     Problem Relation (Age of Onset)    Cancer Maternal Grandmother, Paternal Grandfather    Diabetes Maternal Aunt, Paternal Aunt        Tobacco Use    Smoking status: Never Smoker    Smokeless tobacco: Never Used   Substance and Sexual Activity    Alcohol use: No     Comment: Pt reports some social use of about 1-2  drinks about every six months.    Drug use: No    Sexual activity: Not Currently     Partners: Male     Birth control/protection: None     Review of Systems   Constitutional: Negative for chills and fever.   HENT: Negative for sore throat.    Respiratory: Negative for shortness of breath and wheezing.    Cardiovascular: Positive for leg swelling. Negative for chest pain.   Gastrointestinal: Positive for abdominal distention. Negative for abdominal pain.   Genitourinary: Negative for decreased urine volume.   Musculoskeletal: Positive for joint swelling.        Complaining of bilateral leg pain   Neurological: Negative for dizziness and light-headedness.   Hematological: Negative for adenopathy. Does not bruise/bleed easily.     Objective:     Vital Signs (Most Recent):  Temp: 99 °F (37.2 °C) (07/17/19 1621)  Pulse: (!) 111 (07/17/19 1621)  Resp: 18 (07/17/19 1621)  BP: (!) 106/58 (07/17/19 1621)  SpO2: 96 % (07/17/19 1621) Vital Signs (24h Range):  Temp:  [96.8 °F (36 °C)-99 °F (37.2 °C)] 99 °F (37.2 °C)  Pulse:  [] 111  Resp:  [16-20] 18  SpO2:  [94 %-100 %] 96 %  BP: ()/(41-58) 106/58     Weight: 90.3 kg (199 lb 1.2 oz)  Body mass index is 31.18 kg/m².    Physical Exam   Constitutional: She is oriented to person, place, and time. She appears well-developed and well-nourished. No distress.   HENT:   Head: Normocephalic and atraumatic.   Right Ear: External ear normal.   Left Ear: External ear normal.   Nose: Nose normal.   Eyes: Right eye exhibits no discharge. Left eye exhibits no discharge. No scleral icterus.   Neck: Normal range of motion.   Cardiovascular: Normal rate and intact distal pulses.   Triphasic DP pulses bilaterally   Pulmonary/Chest: Effort normal. No respiratory distress. She has no wheezes.   Abdominal: Soft. She exhibits distension. There is no tenderness.   Musculoskeletal: She exhibits edema (RLE) and tenderness (RLE).   RLE with dressing CDI  RUE with evidence of old AVG, no  palpable thrill or bruit on auscultation   Neurological: She is alert and oriented to person, place, and time. She exhibits normal muscle tone.   Skin: Skin is warm and dry. There is pallor.   Psychiatric: She has a normal mood and affect. Her behavior is normal. Thought content normal.   Vitals reviewed.      Significant Labs:  CBC:   Recent Labs   Lab 07/17/19  1144   WBC 20.91*   RBC 2.23*   HGB 7.0*   HCT 23.3*      *   MCH 31.4*   MCHC 30.0*     CMP:   Recent Labs   Lab 07/17/19  0520   *   CALCIUM 10.7*   ALBUMIN 2.2*   *   K 3.7   CO2 22*   CL 90*   BUN 25*   CREATININE 3.8*     Coagulation:   Recent Labs   Lab 07/17/19  0950   LABPROT 11.8   INR 1.2   APTT 26.8       Significant Diagnostics:  I have reviewed all pertinent imaging results/findings within the past 24 hours.    Assessment/Plan:     Acute deep vein thrombosis (DVT) of both lower extremities  49yo F with PMH of ESRD (on HD MWF via R femoral permacath), DM2, neuropathy, HTN, HLD, CAD, obesity, bulging discs in back who presented to the ED via ambulance following a fall with resultant R tibial fracture s/p ex-fix and definitive repair with Ortho on 7/11, now with acute bilateral DVTs    - Patient seen and examined, labs and imaging reviewed, discussed with staff  - In setting of new onset DVT bilaterally, would recommend anti-coagulation with heparin. Continue to monitor hemoglobin closely given history of prior GI bleed. No current need for DVT filter   - Given only current dialysis access is femoral permacath and she had been dialyzing without issue per this route would leave in place and continue to use for now  - Will order bilateral venous arm study to evaluate for possible alternative for vascular access - she does have extensive history: was on peritoneal dialysis for 14 years prior to February 2019, at which point she developed peritonitis, was admitted at [facility], and had her catheter removed and switched to HD.  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 Per earlier notes she may have plans for PD cath placement at [facility] later this month - will follow this up  - Please call with questions or concerns        Thank you for your consult. I will follow-up with patient. Please contact us if you have any additional questions.    Jagruti Colón MD  Vascular Surgery  [facility]    Vascular Attending  Agree with above  Seen by me 7/17/19 in PM rounds  Recent tibial plateau fx repair  Likely subacute thrombi in bilateral iliac veins - catheter-associated  Definitely on exam no evidence of phlegmasia - only mild 1+ pitting edema.  Full motor/sensory  Anticoagulate as you are doing and no role for filter as she has not failed anticoagulation.    Misael Sofia MD FACS  Vascular/Endovascular Surgery

## 2019-07-18 NOTE — ASSESSMENT & PLAN NOTE
-History of falls 2/2 lower extremity neuropathy.  -Patient fell while walking down the steps outside of her house  -holding home gabapentin 300 BID due to slowed mentation

## 2019-07-18 NOTE — PROGRESS NOTES
ESRD on HD:  Pt scheduled for HD today; hypotensive 89/50; per MAR, pt received midodrine 15 mg po x 1 this AM at 0900; pt alert; NS bolus infusing; pt reports is anuric; Primary Team at bedside (and states Critical Care has been consulted); will f/u w/ Critical Care recommendations.

## 2019-07-18 NOTE — NURSING
Patient hypotensive, BP 88/57 (66). Physician at bedside. Heparin drip continued, 500 mL NS bolus given at this time, dialysis postponed until hemodynamically stable. Critical Care consulted. Patient resting comfortably in bed, HOB lowered, no complaints at this time. States she is tired. Will continue to monitor.

## 2019-07-18 NOTE — SIGNIFICANT EVENT
Hospital Medicine Brief Note    Called to the patient's bedside this morning due to symptomatic hypotension.  On my evaluation, she was sitting upright in her bed and endorsing dizziness and confusion.  She remained oriented throughout my exam, but slow to respond with word-finding difficulties. We assisted her into a supine position.  Blood pressure on initial check in the left upper extremity was  60s/40s.    Reviewed her chart including recent labs, white blood cell count 19, hemoglobin 9, improved from 8.6.  CT head revealed no acute process.  Currently on midodrine 15 mg t.i.d. for the last 3 days    Given her persistent worsening hypotension and leukocytosis, there is high concern for potential CLABSI in her right femoral dialysis line.  Afebrile, no other SIRS physiology to suggest sepsis at this time.  No other etiology of this worsening hypotension. Lower concern from obstructive shock from a saddle embolus.     Plan:  - Performed passive leg raise with improvement in mentation & BP  - Ordered 500 mL IV normal saline bolus  - lactate  - blood cultures peripheral and from line  - TTE  - hold sedating agents including gabapentin  - continue heparin drip   - vancomycin load, Pharm D consult  - piperacillin-tazobactam eGFR <20 dosing  - appreciate critical care consult  - transfer to step-down unit    Case was personally discussed with both Nephrology and Critical Care Pulmonology. PCCM does not feel that this patient needs the ICU at this time, though I did emphasize that she is at risk for further decompensation if she stops responding to fluid boluses. Nephrology recommends CRRT for clearance, but labs from today are reassuring and she does not need emergent dialysis today.     30 mins of Critical Care time was provided in order to assess and manage the high probability of imminent or life-threatening deterioration of cardio-respiratory status requiring vasodilator support and pending  intubation.      Fiordaliza Llamas M.D., M.P.H.  Department of [facility]  [facility]  (pager) [phone]  (spect) [phone]

## 2019-07-18 NOTE — ASSESSMENT & PLAN NOTE
- Performed passive leg raise with improvement in mentation & BP  - Ordered 500 mL IV normal saline bolus  - lactate  - blood cultures peripheral and from line  - TTE  - hold sedating agents including gabapentin  - continue heparin drip   - vancomycin load, Pharm D consult  - piperacillin-tazobactam eGFR <20 dosing  - appreciate critical care consult  - transfer to step-down unit

## 2019-07-18 NOTE — PT/OT/SLP PROGRESS
Occupational Therapy      Patient Name:  Jenni Todd   MRN:  [id number]    Patient not seen today secondary to off floor for testing. Will follow-up 7/18/19..    Maria Alejandra Raymond OT  7/18/2019

## 2019-07-18 NOTE — CONSULTS
[facility]  Critical Care Medicine  Consult Note    Patient Name: Jenni Todd  MRN: [id number]  Admission Date: 7/6/2019  Hospital Length of Stay: 7 days  Code Status: Full Code  Attending Physician: Fiordaliza Llamas MD   Primary Care Provider: Michael Tan Iii, MD   Principal Problem: Closed fracture of right tibial plateau    Consults  Subjective:     HPI:  Ms. Todd is a 49 y/o female with a history significant for ESRD on hemodialysis and recurrent hypotension as an outpatient (on midodrine 10mg q8h plus additional dose as needed during HD) who presented to [facility] on 07/06/19 after an accidental fall from standing at home resulting in a right proximal tibial and fibular fractures.     She underwent an external fixation of her tibial plateau by Dr. Lopez on 07/06/19 and then an ORIF of the right tibia on 07/11/19 by Dr. Desai. 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 She had been refusing DVT prophylaxis this admission. That same morning, but prior to initiation of the heparin infusion, her Hb dropped from 7.9-->7.0 so she was transfused 1 unit of PRBCs with appropriate response. There was no clear source of bleeding. She has since been started on a heparin infusion for systemic anticoagulation.     Ms. Todd notes that she has hypotension as an outpatient and is able to tolerate hemodialysis by taking an additional midodrine. She is euvolemic this admission and currently on room air. Blood cultures were drawn on 07/17/19 and are NGTD. She is not on any antibiotics currently. Critical Care Medicine was consulted for hypotension and concern that Ms. Todd may need vasopressors.          Hospital/ICU Course:  No notes on file    Past Medical History:    Diagnosis Date    Abnormal finding on Pap smear, HPV DNA positive     Anemia associated with chronic renal failure     on Epogen    Blood type B+     Bulging discs - symptomatic      CAD (coronary artery disease)     Cardiomyopathy 3/13/2019    Diabetes mellitus, type 2     ESRD (end stage renal disease) 2004    FSGS (focal segmental glomerulosclerosis)     with collapsing    Hyperlipidemia     Hypertension     Neuropathy     NSTEMI (non-ST elevated myocardial infarction) 3/29/2019    Obesity     Secondary hyperparathyroidism, renal     TIA (transient ischemic attack)     Uterine fibroid     small uterine        Past Surgical History:   Procedure Laterality Date    ANGIOGRAM, CORONARY ARTERY N/A 4/15/2019    Performed by Roland Napier MD at [facility] CATH LAB    APPLICATION, EXTERNAL FIXATION DEVICE, LARGE, TIBIA- SYNTHES Right 2019    Performed by Corwin Lopez MD at [facility] OR 2ND FLR    CARDIAC CATHETERIZATION      PCI x 2     SECTION, CLASSIC      COLONOSCOPY N/A 2018    Performed by Rodrigue Russell MD at [facility] ENDO (2ND FLR)    DEBRIDEMENT-WOUND Left 2016    Performed by Teressa Maddox MD at [facility] OR    DIALYSIS FISTULA CREATION      multiple fistulas and grafts before PD     EGD (ESOPHAGOGASTRODUODENOSCOPY) N/A 3/14/2019    Performed by Adolph Davila MD at [facility] ENDO    EGD (ESOPHAGOGASTRODUODENOSCOPY) N/A 3/13/2019    Performed by Adolph Davila MD at [facility] ENDO    INCISION AND DRAINAGE (I&D), LABIAL N/A 2016    Performed by Harmony Fernandez MD at [facility] OR    INCISION AND DRAINAGE (I&D), LABIAL (ADD ON) Left 2016    Performed by Teressa Maddox MD at [facility] OR    INCISION AND DRAINAGE OF WOUND Left     VULVAR ABCESS WITH NECROSIS    Insertion, Catheter, Central Venous, Hemodialysis N/A 3/28/2019    Performed by Kimo Weeks MD at [facility] CATH LAB    Insertion, Catheter, Central Venous, Hemodialysis N/A 3/18/2019     Performed by [facility]o Weeks MD at [facility] CATH LAB    INSERTION, CATHETER, TUNNELED ABORTED Left 3/14/2019    Performed by Servando Solis MD at [facility] OR    INSERTION, GRAFT, ARTERIOVENOUS, RIGHT ARM Right 3/22/2019    Performed by BESSIE Wynn III, MD at [facility] OR 2ND FLR    INSERTION, INTRA-AORTIC BALLOON PUMP  4/15/2019    Performed by Roland Napier MD at [facility] CATH LAB    Left heart cath Left 4/15/2019    Performed by Roland Napier MD at [facility] CATH LAB    ORIF, FRACTURE, TIBIA, PLATEAU Right 7/11/2019    Performed by Dominick Desai MD at [facility] OR 2ND FLR    ORIF, HIP Left 9/13/2018    Performed by Tevin Grullon MD at [facility] OR    PERITONEAL CATHETER INSERTION      PERMCATH REWIRE- TUNNELED CATH REWIRE Left 11/13/2017    Performed by Baldev Munroe MD at [facility] CATH LAB    PERMCATH REWIRE- TUNNELED CATH REWIRE N/A 10/5/2017    Performed by Baldev Munroe MD at [facility] CATH LAB    REMOVAL, CATHETER, DIALYSIS, PERITONEAL N/A 3/14/2019    Performed by Servando Solis MD at [facility] OR    REMOVAL, EXTERNAL FIXATION DEVICE Right 7/11/2019    Performed by Dominick Desai MD at [facility] OR 2ND FLR    REPLACEMENT, CATHETER, DIALYSIS, OVER GUIDEWIRE, USING EXISTING VENOUS ACCESS N/A 6/27/2019    Performed by Kimo Weeks MD at [facility] CATH LAB    REPLACEMENT, CATHETER, DIALYSIS, OVER GUIDEWIRE, USING EXISTING VENOUS ACCESS Left 5/24/2019    Performed by Baldev Munroe MD at [facility] CATH LAB    UMBILICAL HERNIA REPAIR      Venogram, possible intervention Right 3/20/2019    Performed by BESSIE Wynn III, MD at [facility] CATH LAB       Review of patient's allergies indicates:   Allergen Reactions    Flagyl [metronidazole hcl] Nausea And Vomiting    Clindamycin Diarrhea       Family History     Problem Relation (Age of Onset)    Cancer Maternal Grandmother, Paternal Grandfather    Diabetes Maternal Aunt, Paternal Aunt        Tobacco Use    Smoking status: Never Smoker    Smokeless tobacco:  Never Used   Substance and Sexual Activity    Alcohol use: No     Comment: Pt reports some social use of about 1-2 drinks about every six months.    Drug use: No    Sexual activity: Not Currently     Partners: Male     Birth control/protection: None      Review of Systems   Constitutional: Negative for chills and diaphoresis.   HENT: Negative for postnasal drip.    Eyes: Negative for visual disturbance.   Respiratory: Negative for shortness of breath.    Cardiovascular: Negative for chest pain.   Gastrointestinal: Positive for abdominal distention. Negative for abdominal pain, anal bleeding, blood in stool and constipation.   Genitourinary: Negative for hematuria.   Musculoskeletal: Negative for myalgias.   Skin: Negative for rash.   Neurological: Negative for headaches.   Hematological: Negative for adenopathy.   Psychiatric/Behavioral: Negative for confusion.     Objective:     Vital Signs (Most Recent):  Temp: 98.2 °F (36.8 °C) (07/18/19 0817)  Pulse: 95 (07/18/19 1012)  Resp: 18 (07/18/19 0722)  BP: (!) 84/59 (07/18/19 1012)  SpO2: 97 % (07/18/19 0722) Vital Signs (24h Range):  Temp:  [96.8 °F (36 °C)-99 °F (37.2 °C)] 98.2 °F (36.8 °C)  Pulse:  [] 95  Resp:  [16-18] 18  SpO2:  [95 %-100 %] 97 %  BP: ()/(36-59) 84/59   Weight: 90.3 kg (199 lb 1.2 oz)  Body mass index is 31.18 kg/m².      Intake/Output Summary (Last 24 hours) at 7/18/2019 1106  Last data filed at 7/18/2019 0955  Gross per 24 hour   Intake 1141.36 ml   Output --   Net 1141.36 ml       Physical Exam   Constitutional: She appears well-developed and well-nourished.   HENT:   Head: Normocephalic and atraumatic.   Mouth/Throat: Oropharynx is clear and moist.   Eyes: Pupils are equal, round, and reactive to light. Conjunctivae are normal. Right eye exhibits no discharge. Left eye exhibits no discharge. No scleral icterus.   Neck: Trachea normal, normal range of motion and full passive range of motion without pain. Neck supple. No JVD  present. No tracheal deviation present. No thyromegaly present.   Cardiovascular: Normal rate, regular rhythm, S1 normal, S2 normal, normal heart sounds and intact distal pulses. Exam reveals no gallop and no friction rub.   No murmur heard.  Pulmonary/Chest: Effort normal and breath sounds normal. No respiratory distress. She has no wheezes. She has no rales. She exhibits no tenderness.   Abdominal: Soft. Bowel sounds are normal. She exhibits no distension and no mass. There is no tenderness. There is no rebound and no guarding.   Musculoskeletal: Normal range of motion. She exhibits no tenderness or deformity.   Lymphadenopathy:     She has no cervical adenopathy.   Neurological: No cranial nerve deficit. Coordination normal.   Skin: Skin is warm and dry. No abrasion and no bruising noted.   Psychiatric: She has a normal mood and affect.   Vitals reviewed.      Vents:     Lines/Drains/Airways     Central Venous Catheter Line                 Hemodialysis Catheter right femoral -- days         Percutaneous Central Line Insertion/Assessment - double lumen  06/27/19 1438 right femoral vein;right femoral 20 days          Drain                 Hemodialysis AV Fistula Left upper arm -- days         Hemodialysis AV Graft Right upper arm -- days          Peripheral Intravenous Line                 Peripheral IV - Single Lumen 07/06/19 0823 22 G Right Wrist 12 days         Peripheral IV - Double Lumen 07/11/19 1013 20 G Distal;Left;Posterior Forearm 7 days         Peripheral IV - Single Lumen 07/17/19 1203 20 G;1 3/4 in Right;Anterior Forearm less than 1 day              Significant Labs:    CBC/Anemia Profile:  Recent Labs   Lab 07/17/19  1144 07/17/19  1836 07/18/19  0509   WBC 20.91* 19.26* 19.17*   HGB 7.0* 8.6* 9.0*   HCT 23.3* 28.1* 28.6*    191 230   * 100* 101*   RDW 17.3* 16.5* 16.9*        Chemistries:  Recent Labs   Lab 07/17/19  0520 07/18/19  0509   * 135*   K 3.7 3.7   CL 90* 91*   CO2 22*  19*   BUN 25* 31*   CREATININE 3.8* 4.5*   CALCIUM 10.7* 11.2*   ALBUMIN 2.2* 2.2*   MG 2.2 2.2   PHOS 3.8 3.9       Significant Imaging: I have reviewed and interpreted all pertinent imaging results/findings within the past 24 hours.      ABG  Recent Labs   Lab 07/15/19  1117   PH 7.352   PO2 24*   PCO2 40.9   HCO3 22.7*   BE -3     Assessment/Plan:     Cardiac/Vascular  Hypotension  --Discussed case with Nephrology. They recommend transfer to a non-critical care unit that can perform CRRT if needed. She is euvolemic this admission and her exam is not consistent with volume overload (SpO2 100% on room air). There are no acute indications for RRT currently.   --Hypotension appears to be chronic (noted in chart, pt on midodrine prior to admission).   --No current symptoms consistent with blood loss. Recommend continuing heparin gtt and titrating infusion to therapeutic range for DVTs.  --Consider antibiotics if primary team is concerned for sepsis.     --I do not think transfer to the ICU will benefit this patient currently as this does not appear to be an acute process and Ms. Todd does not have an access point to deliver vasopressors (DVTs in bilateral common femoral veins and bilateral IJs).   --Please call back with any acute changes.        I spent >70 minutes reviewing patient records, examining, and counseling the patient with greater than 50% of the time spent with direct patient care and coordination.     Thank you for your consult. I will sign off. Please contact us if you have any additional questions.     Sung Paz PA-C  Critical Care Medicine  [facility]

## 2019-07-18 NOTE — PROGRESS NOTES
Pharmacokinetic Initial Assessment: IV Vancomycin    Assessment/Plan:  1. Vancomycin 1750 mg (20 mg/kg) IV x 1 today  2. Obtain concentration in AM on 7/19/19  3. Repeat dosing will be contingent on HD plans tomorrow. Pulse dosing vs dosing post-HD on HD days based on vancomycin concentration  4. Goal concentration = 15-20 mcg/mL    Pharmacy will continue to follow and monitor vancomycin.      Please contact pharmacy at extension [phone] with any questions regarding this assessment.     Thank you for the consult,   Nahun Mosley Pharm.D., [facility]  [id number]     Patient brief summary:  Jenni Todd is a 48 y.o. female initiated on antimicrobial therapy with IV Vancomycin for treatment of suspected sepsis    Drug Allergies:   Review of patient's allergies indicates:   Allergen Reactions    Flagyl [metronidazole hcl] Nausea And Vomiting    Clindamycin Diarrhea       Actual Body Weight:   90.3 kg    Renal Function:   Estimated Creatinine Clearance: 17.5 mL/min (A) (based on SCr of 4.5 mg/dL (H)).,     Dialysis Method (if applicable):  HD (MWF at home)    CBC (last 72 hours):  Recent Labs   Lab Result Units 07/16/19  0325 07/17/19  0520 07/17/19  1144 07/17/19  1836 07/18/19  0509   WBC K/uL 14.59* 20.47* 20.91* 19.26* 19.17*   Hemoglobin g/dL 7.9* 7.0* 7.0* 8.6* 9.0*   Hematocrit % 27.9* 22.7* 23.3* 28.1* 28.6*   Platelets K/uL 193 214 197 191 230   Gran% % 81.7* 80.5* 81.9* 80.7* 80.9*   Lymph% % 11.5* 11.3* 9.9* 11.7* 11.0*   Mono% % 5.9 6.9 6.6 6.1 6.6   Eosinophil% % 0.1 0.1 0.1 0.2 0.1   Basophil% % 0.3 0.2 0.2 0.3 0.2   Differential Method  Automated Automated Automated Automated Automated       Metabolic Panel (last 72 hours):  Recent Labs   Lab Result Units 07/16/19  0325 07/17/19  0520 07/18/19  0509   Sodium mmol/L 130* 133* 135*   Potassium mmol/L 4.1 3.7 3.7   Chloride mmol/L 92* 90* 91*   CO2 mmol/L 16* 22* 19*   Glucose mg/dL 116* 124* 174*   BUN, Bld mg/dL 31* 25* 31*   Creatinine mg/dL 5.2* 3.8* 4.5*    Albumin g/dL 2.3* 2.2* 2.2*   Magnesium mg/dL 2.3 2.2 2.2   Phosphorus mg/dL 4.8* 3.8 3.9       Drug levels (last 3 results):  No results for input(s): VANCOMYCINRA, VANCOMYCINPE, VANCOMYCINTR in the last 72 hours.    Microbiologic Results:  Microbiology Results (last 7 days)     Procedure Component Value Units Date/Time    Blood culture [[id number]] Collected:  07/17/19 1000    Order Status:  Completed Specimen:  Blood Updated:  07/18/19 1022     Blood Culture, Routine No Growth to date      No Growth to date    Narrative:       Collection has been rescheduled by FB at 07/17/2019 08:54 Reason:   patient in ultrasound notified nurse Keila  Collection has been rescheduled by FB at 07/17/2019 08:54 Reason:   patient in ultrasound notified nurse Keila    Blood culture [[id number]] Collected:  07/17/19 1000    Order Status:  Completed Specimen:  Blood Updated:  07/18/19 1022     Blood Culture, Routine No Growth to date      No Growth to date    Narrative:       Collection has been rescheduled by FB at 07/17/2019 08:54 Reason:   patient in ultrasound notified nurse Keila  Collection has been rescheduled by FB at 07/17/2019 08:54 Reason:   patient in ultrasound notified nurse Keila

## 2019-07-18 NOTE — HPI
Ms. Todd is a 49 y/o female with a PMH of ESRD (on HD), CAD,  DMII, HTN, HLD,and recurrent hypotension as an outpatient (on midodrine during HD) who [place] [facility] [place] after an accidental fall from standing at home resulting in a right proximal tibial and fibular fractures.   She underwent an external fixation of her tibial plateau by Dr. Lopez on 07/06/19 and then an ORIF of the right tibia on 07/11/19 by Dr. Desai. 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 She had been refusing DVT prophylaxis this admission. That same morning, but prior to initiation of the heparin infusion, her Hb dropped from 7.9-->7.0 so she was transfused 1 unit of PRBCs with appropriate response. There was no clear source of bleeding. She has since been started on a heparin infusion for systemic anticoagulation. She has hypotensive as an outpatient and is able to tolerate hemodialysis by taking an additional midodrine. Blood cultures were drawn on 07/17/19 and are NGTD. Critical Care Medicine was consulted for hypotension and chest pain on 7/19.

## 2019-07-18 NOTE — ASSESSMENT & PLAN NOTE
-Iron 325 mg BID  -epoetin 5800 U every MWF with dialysis  -On 07/16, Hb at 7.9, downtrending from 9.7 on 07/14.  -Anemia workup labs ordered  -Folate WNL  -B12 >2000  -Iron 22, TIBC 130, Transferrin 88, Ferritin 5153  -, Haptoglobin 244  -On 07/17, Hb of 7  -transfused 1 unit pRBC  -07/18, Hb of 8.6

## 2019-07-19 PROBLEM — R57.9 SHOCK: Status: ACTIVE | Noted: 2019-01-01

## 2019-07-19 NOTE — PROGRESS NOTES
"[facility]  Nephrology  Progress Note    Patient Name: Jenni Todd  MRN: [id number]  Admission Date: 7/6/2019  Hospital Length of Stay: 8 days  Attending Provider: Vadim Ngo MD   Primary Care Physician: Michael Tan Iii, MD  Principal Problem:Closed fracture of right tibial plateau    Subjective:     HPI: Ms. Jenni Todd is a 49 yo AAF well known to our service.  She is an ESRD patient with multiple failed vascular access who presents to the ED on 07/06 after suffering a fall at home.  She recently had her tunneled dialysis catheter exchanged on 06/27 to her R femoral vein, and she reports that this catheter has been working fine for the past several treatments, but when she presented to dialysis on Friday, the nurse was unable to aspirate or flush the line and she was informed to present to the hospital for further management.  While on her way to the hospital today, she missed a step and "twisted" her knee and fell.  X-ray of there tibia/fibula revealing an acute fracture of the Tibial Plateau and orthopedics was consulted for further evaluation, planning on external fixation today.  Nephrology has been consulted for ESRD management while in-patient.    She has recently switched dialysis centers, now attending [facility] with her last dialysis treatment being on Wednesday prior to presentation.  She has been followed closely with Dr. Alonso for possible re-placement of her PD catheter, but this has been on hold pending evaluation by cardiology/hepatology.  She is scheduled for surgery on the 25th for PD catheter placement @ [facility] and is scheduled to see hepatology on the 10th of this Month.      Interval History: Patient evaluated bedside, critically ill, stepped up to ICU for hypotension and chest pain, with EKG changes.  Attempted HD session yesterday but was too unstable.    Review of patient's allergies indicates:   Allergen Reactions    " Flagyl [metronidazole hcl] Nausea And Vomiting    Clindamycin Diarrhea     Current Facility-Administered Medications   Medication Frequency    0.9%  NaCl infusion (CRRT USE ONLY) Continuous    0.9%  NaCl infusion (for blood administration) Q24H PRN    0.9%  NaCl infusion (for blood administration) Q24H PRN    0.9%  NaCl infusion PRN    acetaminophen tablet 650 mg Q6H    aspirin suppository 300 mg Once    atorvastatin tablet 40 mg QHS    chlorhexidine 0.12 % solution 15 mL BID    dexmedetomidine (PRECEDEX) 400mcg/100mL 0.9% NaCL infusion Continuous    dextrose 10% (D10W) Bolus PRN    dextrose 10% (D10W) Bolus PRN    epoetin adonay-epbx injection 5,800 Units Every Mon, Wed, Fri    famotidine (PF) injection 20 mg Daily    famotidine tablet 20 mg Nightly PRN    glucagon (human recombinant) injection 1 mg PRN    glucose chewable tablet 16 g PRN    glucose chewable tablet 24 g PRN    heparin (porcine) injection 1,000 Units PRN    heparin 25,000 units in dextrose 5% (100 units/ml) IV bolus from bag - ADDITIONAL PRN BOLUS - 30 units/kg PRN    heparin 25,000 units in dextrose 5% (100 units/ml) IV bolus from bag - ADDITIONAL PRN BOLUS - 60 units/kg PRN    heparin 25,000 units in dextrose 5% 250 mL (100 units/mL) infusion HIGH INTENSITY nomogram - [facility] Continuous    insulin aspart U-100 pen 0-5 Units QID (AC + HS) PRN    iohexol (OMNIPAQUE) oral solution 15 mL PRN    magnesium sulfate 2g in water 50mL IVPB (premix) PRN    midodrine tablet 10 mg PRN    midodrine tablet 20 mg PRN    norepinephrine 4 mg in dextrose 5% 250 mL infusion (premix) (titrating) Continuous    ondansetron injection 4 mg Q12H PRN    oxyCODONE immediate release tablet 5 mg Q4H PRN    pantoprazole injection 40 mg BID    piperacillin-tazobactam 4.5 g in sodium chloride 0.9% 100 mL IVPB (ready to mix system) Q12H    promethazine (PHENERGAN) 6.25 mg in dextrose 5 % 50 mL IVPB Q6H PRN    propofol (DIPRIVAN) 10 mg/mL infusion  Continuous    sodium chloride 0.9% flush 10 mL PRN    sodium phosphate 20.01 mmol in dextrose 5 % 250 mL IVPB PRN    sodium phosphate 30 mmol in dextrose 5 % 250 mL IVPB PRN    sodium phosphate 39.99 mmol in dextrose 5 % 250 mL IVPB PRN       Objective:     Vital Signs (Most Recent):  Temp: 98.4 °F (36.9 °C) (07/19/19 1500)  Pulse: 75 (07/19/19 1600)  Resp: 20 (07/19/19 1600)  BP: 94/64 (07/19/19 1600)  SpO2: 96 % (07/19/19 1600)  O2 Device (Oxygen Therapy): ventilator (07/19/19 1600) Vital Signs (24h Range):  Temp:  [97.6 °F (36.4 °C)-98.9 °F (37.2 °C)] 98.4 °F (36.9 °C)  Pulse:  [] 75  Resp:  [16-37] 20  SpO2:  [90 %-100 %] 96 %  BP: ()/(33-88) 94/64     Weight: 90.3 kg (199 lb) (07/19/19 1400)  Body mass index is 30.26 kg/m².  Body surface area is 2.08 meters squared.    I/O last 3 completed shifts:  In: 1365.2 [I.V.:165.2; IV Piggyback:1200]  Out: -     Physical Exam   Constitutional: She is oriented to person, place, and time. She appears well-developed and well-nourished. She appears distressed.   Critically ill,   HENT:   Head: Normocephalic and atraumatic.   Eyes: Conjunctivae are normal.   Neck: Normal range of motion.   Cardiovascular: Normal rate, regular rhythm and normal heart sounds.   Pulmonary/Chest: Effort normal and breath sounds normal. No respiratory distress.   Abdominal: Soft. She exhibits distension. There is tenderness.   Musculoskeletal:   Bandage in place over RLE.    Neurological: She is alert and oriented to person, place, and time.   Slowed responses while answering questions. Mentation seems to have declined from yesterday.   Skin: No rash noted.   Nursing note and vitals reviewed.      Significant Labs:  CBC:   Recent Labs   Lab 07/19/19  1524   WBC 11.65   RBC 3.63*   HGB 11.2*   HCT 36.3*      *   MCH 30.9   MCHC 30.9*     CMP:   Recent Labs   Lab 07/19/19  0903 07/19/19  1257   * 193*   CALCIUM 11.4* 11.1*   ALBUMIN 2.2* 2.0*   PROT 7.2  --    NA  134* 132*   K 3.9 3.8   CO2 25 22*   CL 91* 92*   BUN 39* 39*   CREATININE 5.1* 5.0*   ALKPHOS 127  --    ALT <5*  --    AST 76*  --    BILITOT 0.5  --      All labs within the past 24 hours have been reviewed.       Assessment/Plan:     ESRD (end stage renal disease) on dialysis  ESRD on iHD  [facility]  Nocturnal dialysis  RIJ tunneled dialysis catheter (Placed 06/27)  Reports an EDW ~ 90.6 kg  Scheduled midodrine, recently increased to 20 mg TID    Plan/Recommendations:  -Will schedule RRT/SLED today once patient is more stable  -Cards eval ASAP  -Strict I/Os   -will continue to f/u  -renal diet when cleared by primary team        Shock  Managed by primary team        Thank you for your consult. I will follow-up with patient. Please contact us if you have any additional questions.    Ronald Verduzco MD  Nephrology  [facility]    ATTENDING PHYSICIAN ATTESTATION  I have personally interviewed and examined the patient. I thoroughly reviewed the demographic, clinical, laboratorial and imaging information available in medical records. I agree with the assessment and recommendations provided by the subspecialty resident. Dr. Germain was under my supervision.

## 2019-07-19 NOTE — CONSULTS
"  [facility]  Adult Nutrition  Consult Note    SUMMARY     Recommendations  Recommendation/Intervention:   1. If patient to remain intubated, recommend initiating TF:    -If remains on current propofol, recommend Peptamen Intense VHP at a goal rate of 45 mL/hr - to provide 1080 kcal/day (1795 kcal w/propofol), 99g protein/day, and 901mL free fluid/day.    -If no longer on propofol, recommend Impact Peptide 1.5 at 45 mL/hr - to provide 1620 kcal/day, 102g protein/day, and 832mL free fluid/day.   2. When able to extubate, ADAT to Renal with texture per SLP.   RD to monitor.    Goals: Patient to receive nutrition by RD follow-up  Nutrition Goal Status: new  Communication of RD Recs: reviewed with RN    Reason for Assessment  Reason For Assessment: consult  Diagnosis: (fracture of R tibia)  Relevant Medical History: CAD, DM2, ESRD on HD, HTN, HLD  General Information Comments: RR this morning, transferred to ICU. Emergently intubated. Patient appears nourished, currently with no physical signs of malnutrition, and no weight loss PTA - some weight fluctuations likey due to HD. Noted some moments of poor PO intake prior to intubation. RD to monitor for malnutrition.  Nutrition Discharge Planning: Unable to determine at this time.    Nutrition Risk Screen  Nutrition Risk Screen: no indicators present    Nutrition/Diet History  Spiritual, Cultural Beliefs, Mandaen Practices, Values that Affect Care: no  Factors Affecting Nutritional Intake: NPO, on mechanical ventilation    Anthropometrics  Temp: 98.9 °F (37.2 °C)  Height Method: Stated  Height: 5' 7" (170.2 cm)  Height (inches): 67 in  Weight Method: Bed Scale  Weight: 90.3 kg (199 lb 1.2 oz)  Weight (lb): 199.08 lb  Ideal Body Weight (IBW), Female: 135 lb  BMI Grade: 30 - 34.9- obesity - grade I    Lab/Procedures/Meds  Pertinent Labs Reviewed: reviewed  Pertinent Labs Comments: Na 134, Cl 91, BUN 39, Creat 5.1, Glu 218, POCT Glu 168-218, HgbA1c 5.3, " Ca 11.4, Alb 2.2  Pertinent Medications Reviewed: reviewed  Pertinent Medications Comments: famotidine, pantoprazole, precedex, levophed, propofol    Estimated/Assessed Needs  Weight Used For Calorie Calculations: 90.3 kg (199 lb 1.2 oz)  Energy Calorie Requirements (kcal): 1707 kcal/day  Energy Need Method: [facility]  Protein Requirements:  g/day(1.5-2.0 g/kg)  Weight Used For Protein Calculations: 61.4 kg (135 lb 5.8 oz)(IBW)  Fluid Requirements (mL): 1 mL/kcal or per MD  Estimated Fluid Requirement Method: RDA Method  RDA Method (mL): 1707    Nutrition Prescription Ordered  Current Diet Order: NPO    Evaluation of Received Nutrient/Fluid Intake  Other Calories (kcal): 715(propofol)  I/O: +1.4L x 24hrs, +1.3L since admit  Comments: LBM 7/16  % Intake of Estimated Energy Needs: 0 - 25 %  % Meal Intake: NPO    Nutrition Risk  Level of Risk/Frequency of Follow-up: high(2x/week)     Assessment and Plan  Nutrition Problem  Inadequate energy intake    Related to (etiology):   Decreased ability to consume sufficient energy    Signs and Symptoms (as evidenced by):   NPO with no alternative means of nutrition at this time    Interventions/Recommendations (treatment strategy):  Collaboration of nutrition care with other providers    Nutrition Diagnosis Status:   New    Monitor and Evaluation  Food and Nutrient Intake: energy intake  Food and Nutrient Adminstration: diet order  Anthropometric Measurements: weight, weight change  Biochemical Data, Medical Tests and Procedures: electrolyte and renal panel, gastrointestinal profile, glucose/endocrine profile, inflammatory profile  Nutrition-Focused Physical Findings: overall appearance     Nutrition Follow-Up  RD Follow-up?: Yes

## 2019-07-19 NOTE — PROGRESS NOTES
Bubble study done x 2 via right groin line. Aspirates well. Flushed before and after with 10 cc ns. Lumen clamped after. Aseptic technique maintained. Nurse notified/given report

## 2019-07-19 NOTE — PROCEDURES
07/19/2019  4:40 PM    Procedure:  Central line  Indication:  Hemodynamic monitoring and venous access for pressors    Risks, benefits, and indications for the procedure were reviewed with family/ primary decision maker. Consent was signed and placed in chart. Pt prepped and draped in sterile fashion. The following sterile precautions were followed: cap and mask, hand hygiene, sterile gown and sterile gloves, large sterile sheet and sterile field and 2% Chlorhexidine for cutaneous antisepsis. Time out was performed with nursing staff. Lidocaine 1% injected at site. Ultrasound guidance utilized to visualize anatomy there was no doppler flow on the left IJ due to proximal stenosis. RIJ showed narrowing with small amounts of flow. Access obtained without difficulty and blood flow was non-pulsatile. However on RIJ access wire was not able to be advanced past 15cm, US showed coiling in the proximal RIJ, maneuvers unsuccessful in allowing the wire to cross this area.     Dr. Sánchez came to bedside to attempt left side axillary central line placement. Visualization showed no stenosis, access was obtained without difficulty and wire threaded with mild initial resistance that resolved with gentle maneuvers. US confirmed position in the vein and the catheter advanced without resistance. Pt tolerated procedure well. No evidence of hematoma at vascular access site. CXR has been ordered to confirm appropriate placement.     Héctor Cardenas MD  Cardiology Fellow  Pager: [phone]

## 2019-07-19 NOTE — NURSING
Ramón Olivo, Huber, Bernardo at bedside to emergently intubate pt and insert IABP. EICU charted all procedures. Pt stable at this time. CXR done to confirm placement.

## 2019-07-19 NOTE — ASSESSMENT & PLAN NOTE
Intubated for worsening respiratory failure likely due to shock state  -- on mechanical ventilation  -- Stat CXR  -- Place NGT for meds  -- Trend ABGs prn

## 2019-07-19 NOTE — ASSESSMENT & PLAN NOTE
- Performed passive leg raise with improvement in mentation & BP  - Ordered 500 mL IV normal saline bolus  - lactate  - blood cultures peripheral and from line  - TTE  - hold sedating agents including gabapentin  - continue heparin drip   - vancomycin load, Pharm D consult  - piperacillin-tazobactam eGFR <20 dosing  - appreciate critical care consult

## 2019-07-19 NOTE — CODE/ RAPID DOCUMENTATION
"RAPID RESPONSE NURSE PROACTIVE ROUNDING NOTE     Time of Visit:  Admit Date: 2019  LOS: 8  Code Status: Full Code   Date of Visit: 2019  : 1970  Age: 48 y.o.  Sex: female  Race: Black or   Bed: 503/503 A:   MRN: [id number]  Was the patient discharged from an ICU this admission? no   Was the patient discharged from a PACU within last 24 hours?  no  Did the patient receive conscious sedation/general anesthesia in last 24 hours?  no  Was the patient in the ED within the past 24 hours?  no  Was the patient started on NIPPV within the past 24 hours?  no  Attending Physician: Fiordaliza Llamas MD  Primary Service: [facility]    ASSESSMENT     Diagnosis: Closed fracture of right tibial plateau    Abnormal Vital Signs: BP (!) 119/52 (Patient Position: Lying)   Pulse 96   Temp 98.8 °F (37.1 °C) (Oral)   Resp 20   Ht 5' 7" (1.702 m)   Wt 90.3 kg (199 lb 1.2 oz)   LMP 2014 (Approximate)   SpO2 (!) 94%   Breastfeeding? No   BMI 31.18 kg/m²      Clinical Issues: Circulatory    Patient  has a past medical history of Abnormal finding on Pap smear, HPV DNA positive, Anemia associated with chronic renal failure, Blood type B+, Bulging discs - symptomatic , CAD (coronary artery disease), Cardiomyopathy, Diabetes mellitus, type 2, ESRD (end stage renal disease), FSGS (focal segmental glomerulosclerosis), Hyperlipidemia, Hypertension, Neuropathy, NSTEMI (non-ST elevated myocardial infarction), Obesity, Secondary hyperparathyroidism, renal, TIA (transient ischemic attack), and Uterine fibroid.    Called by Nurse to evaluate. Patient wake and alert. B/p 120/64 ( taken from right upper arm).  Transferring to Stepdown Unit to be monitored more closely.      INTERVENTIONS/ RECOMMENDATIONS   Will transfer to Stepdown unit when bed clean    Discussed plan of care with RN,     PHYSICIAN ESCALATION     Yes/No  no. Plan already made, f/u per nurse request    Orders received and case " discussed with .    Disposition: Tx to [facility], bed awaiting on bed to be cleaned.    FOLLOW-UP     Call back the Rapid Response Nurse, Annie Garibay RN at [phone] for additional questions or concerns.

## 2019-07-19 NOTE — ASSESSMENT & PLAN NOTE
48F with ESRD, multivessel CAD (s/p NSTEMI 4/2019, 99% mid LAD, 100% distal RCA, 80% prox Lcx), extensive acute iliofemoral DVT,  DM2, HTN, HLD, obesity hospitalized with tibial fx s/p ex-fix on 7/6, transferred to ICU this AM with shock and inferior STEMI. Known extensive CAD, thallium scan from April with fixed 25% apical defect indicative of nonviable myocardium. ИВАН may be reflective of primary CAD or secondary to hypotension/shock (cardiogenic vs obstructive due to PE (given acute iliofemoral/proximal DVT vs septic). Not a candidate for thrombolysis with recent drop in H/H, hx of GIB, coffee grounds in NG tube and her comorbidities. Critically ill patient given acute changes and comorbidities.     -IABP at bedside  -Stabilize with vasopressors, IABP  -Transfuse pRBC 1 unit now  -Tranfer to CCU  -Repeat EKG after IABP and stabilization

## 2019-07-19 NOTE — ASSESSMENT & PLAN NOTE
Will need CRRT today  --- Nephrology following  -- coordinated abut critical events and timing of dialysis

## 2019-07-19 NOTE — PROGRESS NOTES
[facility] Hospital Medicine  Progress Note    Patient Name: Jenni Todd  MRN: [id number]  Patient Class: IP- Inpatient   Admission Date: 7/6/2019  Length of Stay: 8 days  Attending Physician: Vadim Ngo MD  Primary Care Provider: Michael Tan Iii, MD    [facility] Medicine Team: Networked reference to record PCT  Eliel Phillips MD    Subjective:     Principal Problem:Closed fracture of right tibial plateau      HPI:  Patient is a 49 yo F with PMH of ESRD (on HD MWF), DM2, neuropathy, HTN, HLD, CAD, obesity, bulging discs in back who presented to the ED via ambulance following a fall. Patient was walking down the steps in front of her house and fell down and twisted her R leg. She described her leg pain as a 10/10. Last night, while at dialysis, her nurse could not aspirate her R femoral line, and she was instructed to come to the ER due to a clot in her line. She was on her way out of her house to come to the ER when she fell. At baseline, she uses a walker to get around due to her neuropathy in her feet and has a history of falls. In 2018, she fractured her hip and was operated on by Dr. Grullon.    She has had an extensive history for recurrent vascular access problems. She was on peritoneal dialysis for 14 years prior to February 2019, at which point she developed peritonitis, was admitted, and had her catheter removed and switched to HD. 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 Her last dialysis was on Wednesday, 07/03. She plans for a peritoneal dialysis catheter placement on July 25th with Dr. Tucker at [facility].    In the ED, XRs of her pelvis, knee, femur, tibia, and ankle of her R leg were ordered. R tibia XR revealed a tibial fracture and signs of osteopenia. Ortho and Nephrology were consulted. Her ASA and plavix were held, she was splinted, and Ortho planned for an ex-fix  today, which was cleared by Nephrology.           Overview/Hospital Course:  Patient was admitted to the floor on 07/06 with a R tibial fracture and a clotted R femoral access line. Orthopedic Surgery and Nephrology were consulted from the ED. Ortho operated on the patient on 07/06 and did an ex-fix with plans for definitive correction surgery at a later date. On 07/07, Nephrology flushed alteplase into her access line, and she is scheduled for dialysis treatment. Her pain is well-controlled with Tylenol and Oxycodone with Dilaudid available for breakthrough pain. Her home medications were resumed upon admission, except her midodrine was held prior to surgery due to concerns for falling BPs. On 07/08, patient underwent HD and 1L of fluid was removed. On 07/09, Ortho evaluated the swelling of her R knee, and decided to not operate. On 07/10, patient went to HD at which 1.5L of fluid was removed. Her BP dropped to 81/56 following dialysis, and a dose of midodrine 10 mg was ordered. 07/11, patient underwent definitive surgery with Ortho. She tolerated the procedure well and her pain is well-controlled with a ropivacaine nerve block as well as Oxycodone PRN. On 07/12, patient underwent hemodialysis at which 2L was removed over 3.5 hours, and she tolerated it well. PT and OT were consulted for post-operative evaluation.  Patient endorsed inability to move her right toes and ankle with intact pulses and sensation. Anesthesia was informed, and her Ropivacaine nerve block was temporarily discontinued. Her right toes and ankle were reassesed following discontinuing her nerve block, and she was noted to have improved motor and sensory function. Anesthesia resumed her nerve block following examination. On 07/15, patient underwent dialysis, and did not tolerate it well. Only 0.5 L were pulled off out of the planned 1 L due to the patient developing symptomatic hypotension. A CXR was obtained which showed no acute processes. On  07/16, her blood pressures still remained low so her Midodrine was changed to 15 mg TID, with 10 mg Midodrine PRN to if systolic blood pressure <90. Also, midodrine 20 mg PRN 30 minutes prior to HD was ordered. Her nerve block was discontinued indefinitely and her motor and sensory function of her RLE improved. OT and PT worked with her and recommended SNF placement upon discharge. She underwent dialysis for 3.5h and had 0.5L removed with stable vital signs throughout. Patient refused heparin 5k sq for prophylactic VTE - she was counseled extensively about its risks and benefits, however, has continued to refuse throughout her admission. 07/17, patient developed lightheadedness when trying to sit up. Her blood pressure was checked and found to be 75/45, and she was given midodrine 15 mg with good response. She also started to complain of bilateral leg pain on interview in the morning. She has been refusing heparin due to a past experience with a GI bleed throughout this admission. US bilateral lower extremity was ordered which revealed acute thrombi in her bilateral illiac and femoral veins. She was agreeable to start a heparin infusion for treatment, and a consult was placed to Vascular Surgery for evaluation of IVC filter placement. Her Hb was 7 on morning labs, a decrease from 9.7 from 3 days prior. She was transfused 1 unit pRBC. On 07/18, vascular surgery recommended against IVC filter placement, instead opting for heparin therapeutic drip. Also, she developed some altered mental status and was more slow in her responses, so a stat CT Head was obtained, which showed no acute processes. Her apixaban and ASA were continued to be held in the setting of heparin drip, as well as PMH of GI bleed. While in the room for morning rounds, her blood pressure was 64/49, and her mentation was slowed, so she was given a 500 mL NS bolus, and critical care was consulted. Her blood pressures responded well to fluid  resuscitation. Critical Care was consulted who evaluated the patient and recommended against ICU placement. Per Nephrology and Critical Care recommendations, she was moved to a step-down unit which is capable of CRRT in case of volume overload due to fluid bolus while missing her scheduled dialysis. Blood cultures were obtained x2 as well as a culture from her line. She was started on IV antibiotics (vancomycin and pip-tazo) for concerns of sepsis secondary to CLAPSI.     7/19: Patient was complaining of chest pain, pressure like, and SOB. Her BP dropped despite getting 500cc NS over night. BP in 83/52. placed on nonrebreather as her O2 sat dropped to 85% and was worsening subjectively when she was lying flat. ECG showed ST elevations in lead 2, 3 and AVF. Troponin 7.2 , ABG WNL. Lactate similar to yesterday at 2.2 initially then trended up to 6.  CXR and Abd xray ordered once patient is stable. ICU and Cardiology were consulted and patient was transferred to ICU when IABP was placed as well as central line. ETT was placed and connected to ventilator. Levophed was started to control her low BP    Interval History: Patient was complaining of chest pain, pressure like, and SOB. Her BP dropped despite getting 500cc NS over night. BP in 83/52. placed on nonrebreather as her O2 sat dropped to 85% and was worsening subjectively when she was lying flat.    Review of Systems   Constitutional: Positive for activity change. Negative for chills, diaphoresis and fever.   HENT: Negative for congestion, rhinorrhea and sore throat.    Respiratory: Positive for chest tightness and shortness of breath. Negative for cough and wheezing.    Cardiovascular: Positive for chest pain and leg swelling. Negative for palpitations.   Gastrointestinal: Positive for abdominal distention. Negative for abdominal pain, constipation, diarrhea, nausea and vomiting.   Genitourinary: Negative for decreased urine volume and flank pain.        Anuric on  HD   Musculoskeletal: Positive for arthralgias, gait problem and joint swelling.        Complaining of bilateral leg pain   Skin: Negative for rash.   Neurological: Negative for dizziness, seizures, weakness, light-headedness, numbness and headaches.   Hematological: Negative for adenopathy. Does not bruise/bleed easily.   Psychiatric/Behavioral: Negative for agitation, confusion and decreased concentration.     Objective:     Vital Signs (Most Recent):  Temp: 98.4 °F (36.9 °C) (07/19/19 1500)  Pulse: 74 (07/19/19 1800)  Resp: (!) 23 (07/19/19 1800)  BP: (!) 101/59 (07/19/19 1800)  SpO2: 96 % (07/19/19 1800) Vital Signs (24h Range):  Temp:  [98 °F (36.7 °C)-98.9 °F (37.2 °C)] 98.4 °F (36.9 °C)  Pulse:  [] 74  Resp:  [16-37] 23  SpO2:  [90 %-100 %] 96 %  BP: ()/(33-88) 101/59     Weight: 90.3 kg (199 lb)  Body mass index is 30.26 kg/m².    Intake/Output Summary (Last 24 hours) at 7/19/2019 1807  Last data filed at 7/19/2019 1800  Gross per 24 hour   Intake 1985.37 ml   Output 700 ml   Net 1285.37 ml      Physical Exam   Constitutional: She is oriented to person, place, and time. She appears well-developed and well-nourished. She appears distressed.   Looked in distress, wanted to set up for SOB   HENT:   Head: Normocephalic and atraumatic.   Eyes: Pupils are equal, round, and reactive to light. Conjunctivae and EOM are normal. Right eye exhibits no discharge. Left eye exhibits no discharge.   Neck: Normal range of motion. JVD present.   Cardiovascular: Normal rate, regular rhythm and normal heart sounds.   No murmur heard.  Pulmonary/Chest: She is in respiratory distress. She has no wheezes. She has rales. She exhibits no tenderness.   Abdominal: Soft. Bowel sounds are normal. She exhibits distension. There is no tenderness. There is no guarding.   Musculoskeletal: She exhibits tenderness.   Bandage in place over RLE.    Neurological: She is oriented to person, place, and time. No cranial nerve deficit or  sensory deficit. She exhibits normal muscle tone.   Slowed responses while answering questions.    Skin: No rash noted. She is not diaphoretic. No erythema.   Psychiatric: She has a normal mood and affect. Her behavior is normal. Judgment and thought content normal.       Significant Labs: All pertinent labs within the past 24 hours have been reviewed.    Significant Imaging: I have reviewed all pertinent imaging results/findings within the past 24 hours.      Assessment/Plan:        Shock  Patient with ESRD, multivessel CAD (s/p NSTEMI 4/2019, 99% mid LAD, 100% distal RCA, 80% prox Lcx), extensive acute iliofemoral DVT,  DM2, HTN, HLD, obesity hospitalized with tibial fx s/p ex-fix on 7/6,     Patient has long standing hypotension and been taking Midodrine as thought it was related to HD. However, patient in this admission with increasing requirement for IVF and increasing the doses of her Midodrine with no improvement. Patient did not look septic clinically with no bacterial in her blood, no findings in her CXR. Only possible source was her IV lines. She was started on broad Abs. Also her   Hb been dropping low with no visible sourse of bleeding.     ICU were consulted yesterday for her low BP and thought she was stable for the floor as she was fluid responsive. However, today, she continued to have low BP and started complaining of chest pain. ECG with STEMI in inferior leads. Troponin  7. Lactate trending up to 6.     ICU and Cardiology were consulted and patient was sent to CCU where Levophed was started and she was connected to ventilator through ETT. Also patient needed IABP placement.       * Closed fracture of right tibial plateau  -07/06, XR showing tibial fracture  -Ortho consulted and following  -PT/OT consulted and following  -07/06, Ortho did ex-fix  -07/11, underwent definitive surgery, ORIF, with Ortho  -07/13: Patient endorsed inability to move Rt foot toes and ankle with intact pulses and  sensation  -07/14, nerve block was paused, and motor and sensory function were noted to be improved on exam. Anesthesia resumed nerve block after examination.  -07/16, nerve block discontinued.   -07/17, Pain control via Oxycodone PRNs, de-escalated to 5 mgs  -PT/OT recommended SNF placement      CLABSI (central line-associated bloodstream infection)  - Performed passive leg raise with improvement in mentation & BP  - Ordered 500 mL IV normal saline bolus  - lactate  - blood cultures peripheral and from line  - TTE  - hold sedating agents including gabapentin  - continue heparin drip   - vancomycin load, Pharm D consult  - piperacillin-tazobactam eGFR <20 dosing  - appreciate critical care consult        Acute deep vein thrombosis (DVT) of both lower extremities  -07/17, patient complaining of bilateral leg pain  -has refused heparin DVT prophylaxis throughout her admission due to a prior GI bleed  -US bilateral LEs was ordered and showed acute DVTs in bilateral lower extremities, both iliac and femoral veins.  -heparin infusion for treatment  -vascular surgery consulted for possible IVC filter placement        ESRD (end stage renal disease) on dialysis  -reports a clot in vascular access line noticed at dialysis on 07/05  -Neprhology consulted and following  -strict Is/Os, daily weights, renal diet  -Neprhology flushed line with alteplase on 07/07  -HD on 07/08. Went well with 1L fluid removal. Needed Midodrine once for low BP.  -HD on 07/10. Went well with 1.5L fluid removal. Received Midodrine once for low BP.  -HD on 07/12. Went well with 2.5L fluid removal in 3 hours  -07/15, patient did not tolerate HD well, developed symptomatic hypotension, and HD was stopped after 0.5L of the planned 1L dialysis. CXR was obtained which showed no acute processes.   -HD on 07/15. Went well with 0.5L of fluid removed in 3.5 hours. VSS throughout duration.  -Midodrine changed to 15 mg TID damian, with 10 mg PRN for systolic  <90, and 20 mg PRN 30 minutes before HD  -Did not undergo dialysis on 07/17 due to episodes of hypotension likely 2/2 volume depletion and anemia  -renal vitamins        Bulging discs - symptomatic   -gabapentin 300 BID      CAD (coronary artery disease)  -holding home ASA and Plavix inially for procedure.  - Started patient on Heparin gtt for widespread extremities DVTs  - See STEMI for further assessment        Hyperlipidemia  -restarted home lipitor 40      Anemia in chronic kidney disease, on chronic dialysis  -Iron 325 mg BID  -epoetin 5800 U every MWF with dialysis  -On 07/16, Hb at 7.9, downtrending from 9.7 on 07/14.  -Anemia workup labs ordered  -Folate WNL  -B12 >2000  -Iron 22, TIBC 130, Transferrin 88, Ferritin 5153  -, Haptoglobin 244  -On 07/17, Hb of 7  -transfused 1 unit pRBC  -Hb stabilized      Diabetic neuropathy associated with type 2 diabetes mellitus  -History of falls 2/2 lower extremity neuropathy.  -Patient fell while walking down the steps outside of her house  -holding home gabapentin 300 BID due to slowed mentation        VTE Risk Mitigation (From admission, onward)        Ordered     Place sequential compression device  Until discontinued      07/19/19 1046     IP VTE HIGH RISK PATIENT  Once      07/19/19 1046     heparin 25,000 units in dextrose 5% 250 mL (100 units/mL) infusion HIGH INTENSITY nomogram - [facility]  Continuous      07/17/19 0942     heparin 25,000 units in dextrose 5% (100 units/ml) IV bolus from bag - ADDITIONAL PRN BOLUS - 60 units/kg  As needed (PRN)      07/17/19 0942     heparin 25,000 units in dextrose 5% (100 units/ml) IV bolus from bag - ADDITIONAL PRN BOLUS - 30 units/kg  As needed (PRN)      07/17/19 0942     heparin (porcine) injection 5,000 Units  Every 8 hours      07/09/19 1803     heparin (porcine) injection 5,000 Units  Every 8 hours      07/08/19 1415     heparin (porcine) injection 1,000 Units  As needed (PRN)      07/08/19 1210     Place sequential  compression device  Until discontinued      07/06/19 9431                Eliel Phillips MD  Department of Hospital Medicine   [facility]

## 2019-07-19 NOTE — PHYSICIAN QUERY
PT Name: Jenni Todd  MR #: [id number]     PHYSICIAN QUERY -  ACUITY OF CONDITION CLARIFICATION      CDS/: Sita Wu RN               Contact information:[email or web address]  This form is a permanent document in the medical record.     Query Date: July 19, 2019    By submitting this query, we are merely seeking further clarification of documentation to reflect the severity of illness of your patient. Please utilize your independent clinical judgment when addressing the question(s) below.    The Medical record reflects the following:     Indicators   Supporting Clinical Findings Location in Medical Record   x Documentation of condition --Shock, inferior STEMI , extensive acute iliofemoral DVT, acute thrombi in bilateral iliac and femoral veins. Right tibial plateau fracture   --PMH: ESRD, DM2, HTN, CAD, TIA, cardiomyopathy   Cardiology consult 7/19   x Lab Value(s) PH 7.413   PCO2 39.5   HCO3 25.2   POCSATURATED 100   BE 1    Labs 7/18@0843   x Radiology Findings Detrimental interval change since 07/15/2019, relating to the development of pleural fluid on the left CXR 7/19   x Treatment                                 Medication Preoxygenation: nonrebreather mask, Hypoxic respiratory failure    Emergently intubated for work of breathing , IABP, Norepinephrine infusion.  ИВАН may be reflective of primary CAD or secondary to hypotension/shock (cardiogenic vs obstructive due to PE (given acute iliofemoral/proximal DVT) vs septic), or secondary to parodoxical emboli from extensive DVT   Critical Care medicine provider procedure note    Cardiology consult 7/19    Other       Provider, please specify the acuity/chronicity of _Hypoxic Respiratory Failure_____:    [ X  ] Acute   [   ] Subacute   [   ] Ruled Out   [   ]  Clinically Undetermined       Please document in your progress notes daily for the duration of treatment until resolved, and include in your discharge summary.

## 2019-07-19 NOTE — CONSULTS
[facility]  Cardiology  Consult Note    Patient Name: Jenni Todd  MRN: [id number]  Admission Date: 7/6/2019  Hospital Length of Stay: 8 days  Code Status: Full Code   Attending Provider: Vadim Ngo MD   Consulting Provider: Ryan Madrigal MD  Primary Care Physician: Michael Tan Iii, MD  Principal Problem:Closed fracture of right tibial plateau    Patient information was obtained from patient and past medical records.     Inpatient consult to Cardiology  Consult performed by: Ryan Madrigal MD  Consult ordered by: Fiona Winterbottom, APRN, CNS        Subjective:     Chief Complaint:  Chest pain     HPI:   Reason for consult: inferior STEMI, hypotension    HPI: 48F with hx of ESRD on HD (R femoral trialysis), DMII, HTN, HLD, multivessel CAD, obesity who presented after a fall, found to have R tibial fracture. Ex-fix performed 7/6, with plan for staged definitive surgery. She had issues with hypotension after HD throughout hospitalization per notes, on midodrine. Per notes, refused DVT ppx heparin throughout admission due to hx of GIB. On 7/17, she had symptmatic hypotension, improved with midodrine. US bilateral LE showed acute thrombi in bilateral iliac and femoral veins. Started on heparin gtt. Next H/H with Hgb 7.0, transfused 1 unit, subsequent H/H stable in 8.5-9 range. Vascular surgery consulted, did not recommend IVC filter. ASA and apixaban held while on heparin gtt. Cricial care consulted 7/18 after recurrent hypotension to 60s/40s, responsive to 500 cc bolus. Plan was for xfer to stepdown unit capable of CRRT if needed due to hypotension. Blood cx x2 obtained, started on vanc/zosyn.     This AM, transferred to ICU with hypotension to 70s/50s, tachycardia and SOB. Additionally with new onset chest pressure. EKG with STEMI in inferior leads. Cardiology and interventional cardiology consulted. Started on norepinephrine to stabilize BP. Emergently intubated for work of  breathing and stabilization at bedside by critical care with plan for emergent IABP. Initial Tn 7, ongoing chest pressure but improved.     Of note, multivessel disease found on [facility] in April in setting of NSTEMI during hospitalization with sepsis. 99% subtotal mid LAD lesion on wraparound LAD, patent proximal RCA stent with moderate ISR, 100% distal RCA, 80% proximal Lcx stenosis- reviewed with staff. CTS consulted and not a candidate for CABG. Plan was for PET to further assess viability, ordered but not performed for possible high risk PCI. Has not had cardiology follow up. TTE with known depressed EF 25%     [facility] 4/2019:  Left Main   There is moderate diffuse disease throughout the vessel.   Left Anterior Descending   Prox LAD lesion is 99% stenosed.   Left Circumflex   Prox Cx lesion is 80% stenosed.   Right Coronary Artery   Mid RCA to Dist RCA lesion is 100% stenosed.     TTE:   · Severely decreased left ventricular systolic function. The estimated ejection fraction is 25%  · Left ventricular diastolic dysfunction.  · Local segmental wall motion abnormalities.  · Low normal right ventricular systolic function.  · Severe left atrial enlargement.  · Mild-to-moderate aortic valve stenosis.  · Aortic valve area is 1.48 cm2; peak velocity is 1.91 m/s; mean gradient is 10.27 mmHg.  · Moderate mitral sclerosis.  · Moderate to moderate-severe (2-3+) mitral regurgitation.  · Mild to moderate tricuspid regurgitation.  · Mild pulmonic regurgitation.  · Normal central venous pressure (3 mm Hg).  The estimated PA systolic pressure is 32 mm Hg    Past Medical History:   Diagnosis Date    Abnormal finding on Pap smear, HPV DNA positive 2014    Anemia associated with chronic renal failure     on Epogen    Blood type B+     Bulging discs - symptomatic      CAD (coronary artery disease)     Cardiomyopathy 3/13/2019    Diabetes mellitus, type 2     ESRD (end stage renal disease) 04/20/2004    FSGS (focal segmental  glomerulosclerosis)     with collapsing    Hyperlipidemia     Hypertension 2004    Neuropathy     NSTEMI (non-ST elevated myocardial infarction) 3/29/2019    Obesity     Secondary hyperparathyroidism, renal     TIA (transient ischemic attack)     Uterine fibroid     small uterine        Past Surgical History:   Procedure Laterality Date    ANGIOGRAM, CORONARY ARTERY N/A 4/15/2019    Performed by Roland Napier MD at [facility] CATH LAB    APPLICATION, EXTERNAL FIXATION DEVICE, LARGE, TIBIA- SYNTHES Right 2019    Performed by Corwin Lopez MD at [facility] OR 2ND FLR    CARDIAC CATHETERIZATION      PCI x 2     SECTION, CLASSIC      COLONOSCOPY N/A 2018    Performed by Rodrigue Russell MD at [facility] ENDO (2ND FLR)    DEBRIDEMENT-WOUND Left 2016    Performed by Teressa Maddox MD at [facility] OR    DIALYSIS FISTULA CREATION      multiple fistulas and grafts before PD     EGD (ESOPHAGOGASTRODUODENOSCOPY) N/A 3/14/2019    Performed by Adolph Davila MD at [facility] ENDO    EGD (ESOPHAGOGASTRODUODENOSCOPY) N/A 3/13/2019    Performed by Adolph Davila MD at [facility] ENDO    INCISION AND DRAINAGE (I&D), LABIAL N/A 2016    Performed by Harmony Fernandez MD at [facility] OR    INCISION AND DRAINAGE (I&D), LABIAL (ADD ON) Left 2016    Performed by Teressa Maddox MD at [facility] OR    INCISION AND DRAINAGE OF WOUND Left     VULVAR ABCESS WITH NECROSIS    Insertion, Catheter, Central Venous, Hemodialysis N/A 3/28/2019    Performed by Kimo Weeks MD at [facility] CATH LAB    Insertion, Catheter, Central Venous, Hemodialysis N/A 3/18/2019    Performed by Kimo Weeks MD at [facility] CATH LAB    INSERTION, CATHETER, TUNNELED ABORTED Left 3/14/2019    Performed by Servando Solsi MD at [facility] OR    INSERTION, GRAFT, ARTERIOVENOUS, RIGHT ARM Right 3/22/2019    Performed by BESSIE Wynn III, MD at [facility] OR 2ND FLR    INSERTION, INTRA-AORTIC BALLOON PUMP  4/15/2019    Performed by Roland  GUERRERO Napier MD at [facility] CATH LAB    Left heart cath Left 4/15/2019    Performed by Roland Napier MD at [facility] CATH LAB    ORIF, FRACTURE, TIBIA, PLATEAU Right 7/11/2019    Performed by Dominick Desai MD at [facility] OR 2ND FLR    ORIF, HIP Left 9/13/2018    Performed by Tevin Grullon MD at [facility] OR    PERITONEAL CATHETER INSERTION      PERMCATH REWIRE- TUNNELED CATH REWIRE Left 11/13/2017    Performed by Baldev Munroe MD at [facility] CATH LAB    PERMCATH REWIRE- TUNNELED CATH REWIRE N/A 10/5/2017    Performed by Baldev Munroe MD at [facility] CATH LAB    REMOVAL, CATHETER, DIALYSIS, PERITONEAL N/A 3/14/2019    Performed by Servando Solis [facility] at [facility] OR    REMOVAL, EXTERNAL FIXATION DEVICE Right 7/11/2019    Performed by Dominick Desai MD at [facility] OR 2ND FLR    REPLACEMENT, CATHETER, DIALYSIS, OVER GUIDEWIRE, USING EXISTING VENOUS ACCESS N/A 6/27/2019    Performed by Kimo Weeks MD at [facility] CATH LAB    REPLACEMENT, CATHETER, DIALYSIS, OVER GUIDEWIRE, USING EXISTING VENOUS ACCESS Left 5/24/2019    Performed by Baldev Munroe MD at [facility] CATH LAB    UMBILICAL HERNIA REPAIR      Venogram, possible intervention Right 3/20/2019    Performed by BESSIE Wynn III, MD at [facility] CATH LAB       Review of patient's allergies indicates:   Allergen Reactions    Flagyl [metronidazole hcl] Nausea And Vomiting    Clindamycin Diarrhea       No current facility-administered medications on file prior to encounter.      Current Outpatient Medications on File Prior to Encounter   Medication Sig    aspirin (ECOTRIN) 81 MG EC tablet Take 1 tablet (81 mg total) by mouth once daily.    atorvastatin (LIPITOR) 40 MG tablet Take 40 mg by mouth every evening.     clopidogrel (PLAVIX) 75 mg tablet Take 1 tablet (75 mg total) by mouth once daily.    ergocalciferol (ERGOCALCIFEROL) 50,000 unit Cap Take 50,000 Units by mouth every 7 days. Sunday    famotidine (PEPCID) 20 MG tablet Take 1 tablet (20 mg total) by mouth  "nightly as needed for Heartburn.    ferrous sulfate 325 (65 FE) MG EC tablet Take 1 tablet (325 mg total) by mouth 2 (two) times daily. (Patient taking differently: Take 325 mg by mouth once daily. )    gabapentin (NEURONTIN) 300 MG capsule Take 1 capsule (300 mg total) by mouth 2 (two) times daily.    levoFLOXacin (LEVAQUIN) 250 MG tablet Take 1 tablet (250 mg total) by mouth once daily.    magnesium oxide (MAG-OX) 400 mg (241.3 mg magnesium) tablet Take 1 tablet (400 mg total) by mouth 3 (three) times daily.    midodrine (PROAMATINE) 10 MG tablet One to two tab po q 8 hours. May take dose during dialysis if bp drops below SBP 90. (Patient taking differently: 20 mg 3 (three) times daily. May take dose during dialysis if bp drops below SBP 90.)    mupirocin (BACTROBAN) 2 % ointment Apply topically 2 (two) times daily.    ondansetron (ZOFRAN-ODT) 4 MG TbDL Take 2 tablets (8 mg total) by mouth every 8 (eight) hours as needed.    ONETOUCH VERIO Strp Use 1 strip 3 times daily to test Blood sugar    pen needle, diabetic 31 gauge x 3/16" Ndle 1 each by Misc.(Non-Drug; Combo Route) route 4 (four) times daily before meals and nightly.    sevelamer carbonate (RENVELA) 800 mg Tab Take 1 tablet (800 mg total) by mouth 3 (three) times daily with meals.    vitamin renal formula, B-complex-vitamin c-folic acid, (B COMPLEX-C-FOLIC ACID) 1 mg Cap Take 1 capsule by mouth once daily. 1 Capsule Oral Every day    insulin aspart U-100 (NOVOLOG) 100 unit/mL (3 mL) InPn pen Inject 0-5 Units into the skin before meals and at bedtime as needed (Hyperglycemia).     Family History     Problem Relation (Age of Onset)    Cancer Maternal Grandmother, Paternal Grandfather    Diabetes Maternal Aunt, Paternal Aunt        Tobacco Use    Smoking status: Never Smoker    Smokeless tobacco: Never Used   Substance and Sexual Activity    Alcohol use: No     Comment: Pt reports some social use of about 1-2 drinks about every six months.    " Drug use: No    Sexual activity: Not Currently     Partners: Male     Birth control/protection: None     Review of Systems   All other systems reviewed and are negative.    Objective:     Vital Signs (Most Recent):  Temp: 98.9 °F (37.2 °C) (07/19/19 0853)  Pulse: (!) 128 (07/19/19 0925)  Resp: (!) 33 (07/19/19 0925)  BP: (!) 71/51 (07/19/19 0925)  SpO2: (unable to obtain) (07/19/19 0915) Vital Signs (24h Range):  Temp:  [97.6 °F (36.4 °C)-98.9 °F (37.2 °C)] 98.9 °F (37.2 °C)  Pulse:  [] 128  Resp:  [16-35] 33  SpO2:  [94 %-100 %] 99 %  BP: ()/(33-66) 71/51     Weight: 90.3 kg (199 lb 1.2 oz)  Body mass index is 31.18 kg/m².    SpO2: (unable to obtain)  O2 Device (Oxygen Therapy): Non Rebreather Mask      Intake/Output Summary (Last 24 hours) at 7/19/2019 1039  Last data filed at 7/19/2019 0915  Gross per 24 hour   Intake 1115.2 ml   Output --   Net 1115.2 ml       Lines/Drains/Airways     Central Venous Catheter Line                 Hemodialysis Catheter right femoral -- days         Percutaneous Central Line Insertion/Assessment - double lumen  06/27/19 1438 right femoral vein;right femoral 21 days          Drain                 Hemodialysis AV Fistula Left upper arm -- days         Hemodialysis AV Graft Right upper arm -- days         NG/OG Tube 07/19/19 0928 nasogastric Right nostril less than 1 day          Airway                 Airway - Non-Surgical 07/19/19 1030 Endotracheal Tube less than 1 day          Peripheral Intravenous Line                 Peripheral IV - Single Lumen 07/06/19 0823 22 G Right Wrist 13 days         Peripheral IV - Double Lumen 07/11/19 1013 20 G Distal;Left;Posterior Forearm 8 days         Peripheral IV - Single Lumen 07/17/19 1203 20 G;1 3/4 in Right;Anterior Forearm 1 day                Physical Exam   Constitutional: She is oriented to person, place, and time. She appears well-nourished. She appears distressed.   HENT: NGT in place, dark brown/coffee grounds  output  Head: Atraumatic.   Eyes: EOM are normal. No scleral icterus.   Neck: Neck supple. JVD present.   Cardiovascular: Regular rhythm. Tachycardia present.   Pulmonary/Chest: She has no wheezes. She has rales.   Mildly increaed WOB   Abdominal: Soft. Bowel sounds are normal. She exhibits distension. There is no tenderness. R CFV CVC.  Musculoskeletal:   L lateral shin dressing in place, 1-2+ LLE edema.   Neurological: She is alert and oriented to person, place, and time. No cranial nerve deficit.   Skin: Skin is warm and dry. No erythema.   Psychiatric: She has a normal mood and affect.       Significant Labs:   ABG:   Recent Labs   Lab 07/19/19  0843   PH 7.413   PCO2 39.5   HCO3 25.2   POCSATURATED 100   BE 1   , BMP:   Recent Labs   Lab 07/18/19  0509 07/19/19 0819 07/19/19  0903   * 192* 218*   * 136 134*   K 3.7 3.8 3.9   CL 91* 92* 91*   CO2 19* 19* 25   BUN 31* 38* 39*   CREATININE 4.5* 5.0* 5.1*   CALCIUM 11.2* 11.6* 11.4*   MG 2.2 2.3  --    , CMP   Recent Labs   Lab 07/18/19  0509 07/19/19  0819 07/19/19  0903   * 136 134*   K 3.7 3.8 3.9   CL 91* 92* 91*   CO2 19* 19* 25   * 192* 218*   BUN 31* 38* 39*   CREATININE 4.5* 5.0* 5.1*   CALCIUM 11.2* 11.6* 11.4*   PROT  --   --  7.2   ALBUMIN 2.2* 2.2* 2.2*   BILITOT  --   --  0.5   ALKPHOS  --   --  127   AST  --   --  76*   ALT  --   --  <5*   ANIONGAP 25* 25* 18*   ESTGFRAFRICA 12.4* 10.9* 10.7*   EGFRNONAA 10.8* 9.5* 9.3*   , CBC   Recent Labs   Lab 07/18/19  0509 07/19/19  0819 07/19/19  0903   WBC 19.17* 16.64* 16.45*   HGB 9.0* 9.0* 8.3*   HCT 28.6* 29.8* 27.4*    232 235   , INR   Recent Labs   Lab 07/19/19  0903   INR 1.2   , Lipid Panel No results for input(s): CHOL, HDL, LDLCALC, TRIG, CHOLHDL in the last 48 hours. and Troponin   Recent Labs   Lab 07/19/19  0819 07/19/19  0903   TROPONINI 7.239* 7.014*       Significant Imaging: Echocardiogram:   2D echo with color flow doppler:   Results for orders placed or  performed during the hospital encounter of 11/03/16   2D echo with color flow doppler   Result Value Ref Range    QEF 60 55 - 65    Mitral Valve Regurgitation MILD     Diastolic Dysfunction No     Aortic Valve Stenosis TRIVIAL TO MILD     Narrative    Date of Procedure: 11/03/2016        TEST DESCRIPTION       Aorta: The aortic root is normal in size, measuring 3.1 cm at sinotubular junction and 3.1 cm at Sinuses of Valsalva. The proximal ascending aorta is normal in size, measuring 3.3 cm across.     Left Atrium: The left atrial volume index is normal, measuring 29.33 cc/m2.     Left Ventricle: The left ventricle is normal in size, with an end-diastolic diameter of 4.6 cm, and an end-systolic diameter of 3.2 cm. Septal wall thickness is upper limit of normal in size, with the septum measuring 1.1 cm and the posterior wall   measuring 1.0 cm across. Relative wall thickness was normal at 0.43, and the LV mass index was 93.8 g/m2 consistent with normal left ventricular mass. Global left ventricular systolic function appears normal. Visually estimated ejection fraction is   60-65%. The LV Doppler derived stroke volume equals 72.0 ccs.   The E/e'(lat) is 8, consistent with normal diastolic function.     Right Atrium: The right atrium is upper limit of normal, measuring 4.9 cm in length and 3.9 cm in width in the apical view.     Right Ventricle: The right ventricle is normal in size measuring 2.4 cm at the base in the apical right ventricle-focused view. Global right ventricular systolic function appears normal. Tricuspid annular plane systolic excursion (TAPSE) is 1.9 cm.   Tissue Doppler-derived tricuspid annular peak systolic velocity (S prime) is 13.3 cm/s.     Aortic Valve:  The aortic valve is moderately sclerotic with mildly restricted leaflet mobility. The aortic valve is tri-leaflet in structure. The peak velocity obtained across the aortic valve is 2.1 m/s, which translates to a peak gradient of 18.0   mmHg.  The mean gradient is 10.0 mmHg. Using a left ventricular outflow tract diameter of 2.4 cm, a left ventricular outflow tract velocity time integral of 16 cm, and a peak instantaneous transvalvular velocity time integral of 37 cm, the calculated   aortic valve area is 1.95 cm2, consistent with trivial to mild aortic stenosis.     Mitral Valve:  There is mild mitral regurgitation. There is marked mitral annular calcification.     IVC: The IVC is not visualized.     Intracavitary: There is no evidence of pericardial effusion, intracavity mass, thrombi, or vegetation.         CONCLUSIONS     1 - Normal left ventricular systolic function (EF 60-65%).     2 - Normal left ventricular diastolic function.     3 - Normal right ventricular systolic function .     4 - Trivial to mild aortic stenosis, ARNOLD = 1.95 cm2, peak velocity = 2.1 m/s, mean gradient = 10.0 mmHg.     5 - Mild mitral regurgitation.             This document has been electronically    SIGNED BY: Tc Dominguez MD On: 11/03/2016 11:13    and Transthoracic echo (TTE) complete (Cupid Only):   Results for orders placed or performed during the hospital encounter of 04/12/19   Transthoracic echo (TTE) complete (Cupid Only)   Result Value Ref Range    Ascending aorta 2.53 cm    STJ 2.21 cm    AV mean gradient 10.27 mmHg    Ao peak otoniel 1.91 m/s    Ao VTI 22.93 cm    IVRT 0.08 msec    IVS 0.93 0.6 - 1.1 cm    LA size 4.33 cm    Left Atrium Major Axis 5.99 cm    Left Atrium Minor Axis 5.99 cm    LVIDD 5.23 3.5 - 6.0 cm    LVIDS 4.05 (A) 2.1 - 4.0 cm    LVOT diameter 1.96 cm    LVOT peak VTI 11.29 cm    PW 1.04 0.6 - 1.1 cm    MV Peak E Otoniel 1.07 m/s    RA Major Axis 4.73 cm    RA Width 3.54 cm    RVDD 3.89 cm    Sinus 2.57 cm    TAPSE 1.15 cm    TR Max Otoniel 2.71 m/s    TDI LATERAL 0.10     TDI SEPTAL 0.10     LA WIDTH 4.53 cm    LV Diastolic Volume 130.98 mL    LV Systolic Volume 72.11 mL    RV S' 8.94 m/s    LVOT peak otoniel 0.083385164475868 m/s    LV LATERAL E/E' RATIO 10.70      LV SEPTAL E/E' RATIO 10.70     FS 23 %    LA volume 99.87 cm3    LV mass 192.12 g    Left Ventricle Relative Wall Thickness 0.40 cm    AV valve area 1.48 cm2    AV Velocity Ratio 0.51     AV index (prosthetic) 0.49     Mean e' 0.10     LVOT area 3.02 cm2    LVOT stroke volume 34.05 cm3    AV peak gradient 14.59 mmHg    E/E' ratio 10.70     LV Systolic Volume Index 34.7 mL/m2    LV Diastolic Volume Index 62.94 mL/m2    LA Volume Index 48.0 mL/m2    LV Mass Index 92.3 g/m2    Triscuspid Valve Regurgitation Peak Gradient 29.38 mmHg    BSA 2.14 m2    Right Atrial Pressure (from IVC) 3 mmHg    TV rest pulmonary artery pressure 32 mmHg    Narrative    · Severely decreased left ventricular systolic function. The estimated   ejection fraction is 25%  · Left ventricular diastolic dysfunction.  · Local segmental wall motion abnormalities.  · Low normal right ventricular systolic function.  · Severe left atrial enlargement.  · Mild-to-moderate aortic valve stenosis.  · Aortic valve area is 1.48 cm2; peak velocity is 1.91 m/s; mean gradient   is 10.27 mmHg.  · Moderate mitral sclerosis.  · Moderate to moderate-severe (2-3+) mitral regurgitation.  · Mild to moderate tricuspid regurgitation.  · Mild pulmonic regurgitation.  · Normal central venous pressure (3 mm Hg).  · The estimated PA systolic pressure is 32 mm Hg       and EKG: sinus tachycardia, 1 mm ИВАН II/III/aVF inferior leads with reciprocal depression    Assessment and Plan:     Shock  48F with ESRD, multivessel CAD (s/p NSTEMI 4/2019, 99% mid LAD, 100% distal RCA, 80% prox Lcx), extensive acute iliofemoral DVT,  DM2, HTN, HLD, obesity hospitalized with tibial fx s/p ex-fix on 7/6, transferred to ICU this AM with shock and inferior STEMI. Known extensive CAD, thallium scan from April with fixed 25% apical defect indicative of nonviable myocardium. ИВАН may be reflective of primary CAD or secondary to hypotension/shock (cardiogenic vs obstructive due to PE (given acute  iliofemoral/proximal DVT) vs septic), or secondary to parodoxical emboli from extensive DVT. Not a candidate for thrombolysis with recent drop in H/H, hx of GIB, coffee grounds in NG tube and her comorbidities. Critically ill patient given acute changes and comorbidities. Also with c/f coffee grounds output via NGT output. Currently would recommend medical management, concern for active GIB, nonrevascularizable RCA disease, fixed nonviable apical defect in 99% LAD territory, no ischemia noted in lateral (Lcx territory), thus contraindications to LHC and low likelihood of any benefit, particularly as suspected EKG changes/infarct due to hypotension.    -IABP at bedside  -Stabilize with vasopressors, IABP  -Transfuse pRBC 2 unit now, PPI 40 IV bid  -Tranfer to CCU  -Repeat EKG after IABP and stabilization  -Further decision regarding LHC pending above  -Echo with bubble study    Thank you for your consult. Discussed with staff Dr. Camacho and Dr. Lindsey Madrigal MD  Cardiology   [facility]

## 2019-07-19 NOTE — PT/OT/SLP DISCHARGE
Physical Therapy Discharge Summary    Name: Jenni Todd  MRN: [id number]   Principal Problem: Closed fracture of right tibial plateau     Patient Discharged from acute Physical Therapy on 19.  Please refer to prior PT noted date on 7/15/19 for functional status.     Assessment:     Patient transferred to [facility] of care in ICU and intubated.      Objective:     GOALS:   Multidisciplinary Problems     Physical Therapy Goals        Problem: Physical Therapy Goal    Goal Priority Disciplines Outcome Goal Variances Interventions   Physical Therapy Goal     PT, PT/OT Ongoing (interventions implemented as appropriate)     Description:  Goals to be met by: 19    Patient will increase functional independence with mobility by performin. Supine to sit with Moderate Assistance -not met  2. Sit to supine with Moderate Assistance -not met  3. Sit to stand transfer with Moderate Assistance with RW -not met  4. Bed to chair transfer with Minimal Assistance using Slideboard with NWB R LE -not met  5. Pt to propel w/c 50ft with B UE on level surface with CGA-not met                       Reasons for Discontinuation of Therapy Services  Patient transferred to ICU and intubated.     Plan:     Patient Discharged to: ICU    Amy Agarwal, PT  2019

## 2019-07-19 NOTE — PT/OT/SLP PROGRESS
Physical Therapy  Pt Not Seen    Patient Name:  Jenni Todd   MRN:  [id number]    Patient not seen today secondary to  Other (Comment)(Pt transferred to ICU this date.  PT to write discharge summary). Will follow-up on next scheduled visit.    Lexi Abel, PTA  7/19/2019

## 2019-07-19 NOTE — PT/OT/SLP PROGRESS
Occupational Therapy      Patient Name:  Jenni Todd   MRN:  [id number]    Patient not seen today secondary to Other (Comment)(Pt transferred to ICU and intubated. Will require new orders.). Will follow-up as medically stable.    Yumi Fregoso OT  7/19/2019

## 2019-07-19 NOTE — PLAN OF CARE
Problem: Adult Inpatient Plan of Care  Goal: Plan of Care Review  Outcome: Ongoing (interventions implemented as appropriate)  Pt yuan and arrived from ICU via bed. Pt aaox3, nad, vss. Dsgs to R leg CDI, 2+ pedal pulses, CR less than 3 secs, can feel touch, AROM. No c/o pain at present. Pt inst not to get out of bed without asst, and use of call light. All necessary items within reach. Pt rested comfortably most of night. Heparin drip inf @ 9.2ml/hr. MN APTT therapeutic. IV abx given.

## 2019-07-19 NOTE — PT/OT/SLP DISCHARGE
Occupational Therapy Discharge Summary    Jenni Todd  MRN: [id number]   Principal Problem: Closed fracture of right tibial plateau      Patient Discharged from acute Occupational Therapy on 7/19/19.  Please refer to prior OT note dated 7/17/19 for functional status.    Assessment:      Pt transferred to ICU and requiring higher level of care.    Objective:     GOALS:   Multidisciplinary Problems     Occupational Therapy Goals        Problem: Occupational Therapy Goal    Goal Priority Disciplines Outcome Interventions   Occupational Therapy Goal     OT, PT/OT Ongoing (interventions implemented as appropriate)    Description:  Goals to be met by: 8/9/2019    Patient will increase functional independence with ADLs by performing:    UE Dressing with Set-up Assistance.  LE Dressing with Stand-by Assistance.  Toileting from bedside commode with Supervision for hygiene and clothing management.   Supine to sit with Supervision.    Maria Alejandra Raymond, OT  7/9/2019                        Reasons for Discontinuation of Therapy Services  Transfer to alternate level of care.      Plan:     Patient Discharged to: ICU    Yumi Fregoso OT  7/19/2019

## 2019-07-19 NOTE — ASSESSMENT & PLAN NOTE
-Iron 325 mg BID  -epoetin 5800 U every MWF with dialysis  -On 07/16, Hb at 7.9, downtrending from 9.7 on 07/14.  -Anemia workup labs ordered  -Folate WNL  -B12 >2000  -Iron 22, TIBC 130, Transferrin 88, Ferritin 5153  -, Haptoglobin 244  -On 07/17, Hb of 7  -transfused 1 unit pRBC  -Hb stabilized

## 2019-07-19 NOTE — ASSESSMENT & PLAN NOTE
-reports a clot in vascular access line noticed at dialysis on 07/05  -Neprhology consulted and following  -strict Is/Os, daily weights, renal diet  -Neprhology flushed line with alteplase on 07/07  -HD on 07/08. Went well with 1L fluid removal. Needed Midodrine once for low BP.  -HD on 07/10. Went well with 1.5L fluid removal. Received Midodrine once for low BP.  -HD on 07/12. Went well with 2.5L fluid removal in 3 hours  -07/15, patient did not tolerate HD well, developed symptomatic hypotension, and HD was stopped after 0.5L of the planned 1L dialysis. CXR was obtained which showed no acute processes.   -HD on 07/15. Went well with 0.5L of fluid removed in 3.5 hours. VSS throughout duration.  -Midodrine changed to 15 mg TID scheudled, with 10 mg PRN for systolic <90, and 20 mg PRN 30 minutes before HD  -Did not undergo dialysis on 07/17 due to episodes of hypotension likely 2/2 volume depletion and anemia  -renal vitamins

## 2019-07-19 NOTE — HPI
Reason for admission shock with associated inferior STEMI.    This is a 48 woman with hx of ESRD on HD (R femoral trialysis), numerous DVT with associated occlusion of the RIJ and LIJ, DMII, HTN, HLD,GIB, 3V CAD that is not able to be revascularized, and obesity. She initially presented 07/06 after a fall, found to have R tibial fracture with Ex-fix on 7/6 and ORIF on 07/11. Post-op she had several issues that include non-compliance with DVT prophylaxis, thrombosis of her catheter (and prior thrombosis of her fistulas), hypotension after HD requiring midodrine, and persisting constipation. On 7/17, she had symptmatic hypotension workup showed acute thrombi in bilateral iliac and femoral veins, requiring initiation of heparin gtt. Subsequently had a drop in Hgb requiring 1U, vascular surgery deemed an IVC filter to be of little additional value. Critical care consulted 7/18 after recurrent hypotension to 60s/40s, responsive to 500 cc bolus, Vanc/zosyn was started and workup was started for infection. Did ok overnight, but had more nausea, vomiting and chest pressure this morning in the setting of hypotension, requiring emergent intubation. EKG suspicious for acute MI in inferior leads, she was evaluated by interventional who suspected demand ischemia, inserted an IABP at bedside with subsequent normalization of EKG changes.    On exam abdomen was tense and without bowel sounds, echo performed at bedside showed no RV strain and persistently reduced LVEF. Patient required additional central access to allow pressor administration during CRRT, access only able to be obtained through left axillary vein with the assistance of critical care surgery team.

## 2019-07-19 NOTE — ASSESSMENT & PLAN NOTE
Patient with ESRD, multivessel CAD (s/p NSTEMI 4/2019, 99% mid LAD, 100% distal RCA, 80% prox Lcx), extensive acute iliofemoral DVT,  DM2, HTN, HLD, obesity hospitalized with tibial fx s/p ex-fix on 7/6,     Patient has long standing hypotension and been taking Midodrine as thought it was related to HD. However, patient in this admission with increasing requirement for IVF and increasing the doses of her Midodrine with no improvement. Patient did not look septic clinically with no bacterial in her blood, no findings in her CXR. Only possible source was her IV lines. She was started on broad Abs. Also her   Hb been dropping low with no visible sourse of bleeding.     ICU were consulted yesterday for her low BP and thought she was stable for the floor as she was fluid responsive. However, today, she continued to have low BP and started complaining of chest pain. ECG with STEMI in inferior leads. Troponin  7. Lactate trending up to 6.     ICU and Cardiology were consulted and patient was sent to CCU where Levophed was started and she was connected to ventilator through ETT. Also patient needed IABP placement.

## 2019-07-19 NOTE — HOSPITAL COURSE
Ms Todd presented to [facility] on 7/6 after a fall.  Shehad an external fixation of her tibial plateau by Dr. Lopez on 07/06/19 and then an ORIF of the right tibia on 07/11/19 by Dr. Desai. On 07/17/19 she was noted to have bilateral iliac and common femoral DVTs and a left femoral DVT.  She was started on a heparin infusion for systemic anticoagulation on 7/18. Critical Care was consulted on 7/18 for hypotension however fluid was given and the patient seemed to improve. On 7/19, Critical Care Medicine was consulted for hypotension and chest pain on 7/19. The EKG appeared to have ST-T changes in the inferior leads. Cardiology was consulted for emergent evaluation. The team recommended a IABP be placed and that the patient be transferred to the CCU service.

## 2019-07-19 NOTE — NURSING
Report received from CRISTOBAL Edwards. Pt transported to SICU [address]25 with portable telemetryNon-Rebreather. Pt connected to ICU monitor Wall O2. Dr. Winterbottom called and made aware of patient arrival. New orders received and implemented. Pt assessed, immediate needs met. Family brought to bedside, updated on the patient's current condition and PoC for remainder of shift. Family also given ICU Welcome packet and educated on visiting hours. All questions answered, emotional support provided.     Admit Skin Note: no breakdown noted; dressings to right leg.

## 2019-07-19 NOTE — SUBJECTIVE & OBJECTIVE
Interval History: Patient evaluated bedside, critically ill, stepped up to ICU for hypotension and chest pain, with EKG changes.  Attempted HD session yesterday but was too unstable.    Review of patient's allergies indicates:   Allergen Reactions    Flagyl [metronidazole hcl] Nausea And Vomiting    Clindamycin Diarrhea     Current Facility-Administered Medications   Medication Frequency    0.9%  NaCl infusion (CRRT USE ONLY) Continuous    0.9%  NaCl infusion (for blood administration) Q24H PRN    0.9%  NaCl infusion (for blood administration) Q24H PRN    0.9%  NaCl infusion PRN    acetaminophen tablet 650 mg Q6H    aspirin suppository 300 mg Once    atorvastatin tablet 40 mg QHS    chlorhexidine 0.12 % solution 15 mL BID    dexmedetomidine (PRECEDEX) 400mcg/100mL 0.9% NaCL infusion Continuous    dextrose 10% (D10W) Bolus PRN    dextrose 10% (D10W) Bolus PRN    epoetin adonay-epbx injection 5,800 Units Every Mon, Wed, Fri    famotidine (PF) injection 20 mg Daily    famotidine tablet 20 mg Nightly PRN    glucagon (human recombinant) injection 1 mg PRN    glucose chewable tablet 16 g PRN    glucose chewable tablet 24 g PRN    heparin (porcine) injection 1,000 Units PRN    heparin 25,000 units in dextrose 5% (100 units/ml) IV bolus from bag - ADDITIONAL PRN BOLUS - 30 units/kg PRN    heparin 25,000 units in dextrose 5% (100 units/ml) IV bolus from bag - ADDITIONAL PRN BOLUS - 60 units/kg PRN    heparin 25,000 units in dextrose 5% 250 mL (100 units/mL) infusion HIGH INTENSITY nomogram - [facility] Continuous    insulin aspart U-100 pen 0-5 Units QID (AC + HS) PRN    iohexol (OMNIPAQUE) oral solution 15 mL PRN    magnesium sulfate 2g in water 50mL IVPB (premix) PRN    midodrine tablet 10 mg PRN    midodrine tablet 20 mg PRN    norepinephrine 4 mg in dextrose 5% 250 mL infusion (premix) (titrating) Continuous    ondansetron injection 4 mg Q12H PRN    oxyCODONE immediate release tablet 5 mg Q4H PRN     pantoprazole injection 40 mg BID    piperacillin-tazobactam 4.5 g in sodium chloride 0.9% 100 mL IVPB (ready to mix system) Q12H    promethazine (PHENERGAN) 6.25 mg in dextrose 5 % 50 mL IVPB Q6H PRN    propofol (DIPRIVAN) 10 mg/mL infusion Continuous    sodium chloride 0.9% flush 10 mL PRN    sodium phosphate 20.01 mmol in dextrose 5 % 250 mL IVPB PRN    sodium phosphate 30 mmol in dextrose 5 % 250 mL IVPB PRN    sodium phosphate 39.99 mmol in dextrose 5 % 250 mL IVPB PRN       Objective:     Vital Signs (Most Recent):  Temp: 98.4 °F (36.9 °C) (07/19/19 1500)  Pulse: 75 (07/19/19 1600)  Resp: 20 (07/19/19 1600)  BP: 94/64 (07/19/19 1600)  SpO2: 96 % (07/19/19 1600)  O2 Device (Oxygen Therapy): ventilator (07/19/19 1600) Vital Signs (24h Range):  Temp:  [97.6 °F (36.4 °C)-98.9 °F (37.2 °C)] 98.4 °F (36.9 °C)  Pulse:  [] 75  Resp:  [16-37] 20  SpO2:  [90 %-100 %] 96 %  BP: ()/(33-88) 94/64     Weight: 90.3 kg (199 lb) (07/19/19 1400)  Body mass index is 30.26 kg/m².  Body surface area is 2.08 meters squared.    I/O last 3 completed shifts:  In: 1365.2 [I.V.:165.2; IV Piggyback:1200]  Out: -     Physical Exam   Constitutional: She is oriented to person, place, and time. She appears well-developed and well-nourished. She appears distressed.   Critically ill,   HENT:   Head: Normocephalic and atraumatic.   Eyes: Conjunctivae are normal.   Neck: Normal range of motion.   Cardiovascular: Normal rate, regular rhythm and normal heart sounds.   Pulmonary/Chest: Effort normal and breath sounds normal. No respiratory distress.   Abdominal: Soft. She exhibits distension. There is tenderness.   Musculoskeletal:   Bandage in place over RLE.    Neurological: She is alert and oriented to person, place, and time.   Slowed responses while answering questions. Mentation seems to have declined from yesterday.   Skin: No rash noted.   Nursing note and vitals reviewed.      Significant Labs:  CBC:   Recent Labs    Lab 07/19/19  1524   WBC 11.65   RBC 3.63*   HGB 11.2*   HCT 36.3*      *   MCH 30.9   MCHC 30.9*     CMP:   Recent Labs   Lab 07/19/19  0903 07/19/19  1257   * 193*   CALCIUM 11.4* 11.1*   ALBUMIN 2.2* 2.0*   PROT 7.2  --    * 132*   K 3.9 3.8   CO2 25 22*   CL 91* 92*   BUN 39* 39*   CREATININE 5.1* 5.0*   ALKPHOS 127  --    ALT <5*  --    AST 76*  --    BILITOT 0.5  --      All labs within the past 24 hours have been reviewed.

## 2019-07-19 NOTE — PLAN OF CARE
Problem: Adult Inpatient Plan of Care  Goal: Plan of Care Review  Recommendations  Recommendation/Intervention:   1. If patient to remain intubated, recommend initiating TF:    -If remains on current propofol, recommend Peptamen Intense VHP at a goal rate of 45 mL/hr - to provide 1080 kcal/day (1795 kcal w/propofol), 99g protein/day, and 901mL free fluid/day.    -If no longer on propofol, recommend Impact Peptide 1.5 at 45 mL/hr - to provide 1620 kcal/day, 102g protein/day, and 832mL free fluid/day.   2. When able to extubate, ADAT to Renal with texture per SLP.   RD to monitor.    Goals: Patient to receive nutrition by RD follow-up  Nutrition Goal Status: new    Full assessment completed, see RD Note 7/19/2019.

## 2019-07-19 NOTE — SUBJECTIVE & OBJECTIVE
Interval History: Patient was complaining of chest pain, pressure like, and SOB. Her BP dropped despite getting 500cc NS over night. BP in 83/52. placed on nonrebreather as her O2 sat dropped to 85% and was worsening subjectively when she was lying flat.    Review of Systems   Constitutional: Positive for activity change. Negative for chills, diaphoresis and fever.   HENT: Negative for congestion, rhinorrhea and sore throat.    Respiratory: Positive for chest tightness and shortness of breath. Negative for cough and wheezing.    Cardiovascular: Positive for chest pain and leg swelling. Negative for palpitations.   Gastrointestinal: Positive for abdominal distention. Negative for abdominal pain, constipation, diarrhea, nausea and vomiting.   Genitourinary: Negative for decreased urine volume and flank pain.        Anuric on HD   Musculoskeletal: Positive for arthralgias, gait problem and joint swelling.        Complaining of bilateral leg pain   Skin: Negative for rash.   Neurological: Negative for dizziness, seizures, weakness, light-headedness, numbness and headaches.   Hematological: Negative for adenopathy. Does not bruise/bleed easily.   Psychiatric/Behavioral: Negative for agitation, confusion and decreased concentration.     Objective:     Vital Signs (Most Recent):  Temp: 98.4 °F (36.9 °C) (07/19/19 1500)  Pulse: 74 (07/19/19 1800)  Resp: (!) 23 (07/19/19 1800)  BP: (!) 101/59 (07/19/19 1800)  SpO2: 96 % (07/19/19 1800) Vital Signs (24h Range):  Temp:  [98 °F (36.7 °C)-98.9 °F (37.2 °C)] 98.4 °F (36.9 °C)  Pulse:  [] 74  Resp:  [16-37] 23  SpO2:  [90 %-100 %] 96 %  BP: ()/(33-88) 101/59     Weight: 90.3 kg (199 lb)  Body mass index is 30.26 kg/m².    Intake/Output Summary (Last 24 hours) at 7/19/2019 1807  Last data filed at 7/19/2019 1800  Gross per 24 hour   Intake 1985.37 ml   Output 700 ml   Net 1285.37 ml      Physical Exam   Constitutional: She is oriented to person, place, and time. She  appears well-developed and well-nourished. She appears distressed.   Looked in distress, wanted to set up for SOB   HENT:   Head: Normocephalic and atraumatic.   Eyes: Pupils are equal, round, and reactive to light. Conjunctivae and EOM are normal. Right eye exhibits no discharge. Left eye exhibits no discharge.   Neck: Normal range of motion. JVD present.   Cardiovascular: Normal rate, regular rhythm and normal heart sounds.   No murmur heard.  Pulmonary/Chest: She is in respiratory distress. She has no wheezes. She has rales. She exhibits no tenderness.   Abdominal: Soft. Bowel sounds are normal. She exhibits distension. There is no tenderness. There is no guarding.   Musculoskeletal: She exhibits tenderness.   Bandage in place over RLE.    Neurological: She is oriented to person, place, and time. No cranial nerve deficit or sensory deficit. She exhibits normal muscle tone.   Slowed responses while answering questions.    Skin: No rash noted. She is not diaphoretic. No erythema.   Psychiatric: She has a normal mood and affect. Her behavior is normal. Judgment and thought content normal.       Significant Labs: All pertinent labs within the past 24 hours have been reviewed.    Significant Imaging: I have reviewed all pertinent imaging results/findings within the past 24 hours.

## 2019-07-19 NOTE — SIGNIFICANT EVENT
I was called about patient low BP, 83/52 and patient is tachycardic and has SOB.    Saw her at bed side around 8:14 AM, looked in distress and complained of chest pain, pressure like with SOB and ''feeling off''.  She had audible heart sounds and bilateral lung sounds with mild rales. She was placed on nonrebreather as her O2 sat dropped to 85% and was worsening subjectively when she was lying flat.    ECG showed ST elevations in lead 2, 3 and AVF. Troponin ordered, ABG WNL. Lactate similar to yesterday at 2.2. Rest of morning labs in process. CXR and Abd xray once patient is stable.    Ordered 250cc NS for her low BP, cautious to give more as she was looking slightly edematous with mild rales in the setting of ESRD.    ICU were called and were at bed side. Recommending consulting Cardiology for STEMI activation. Patient will be transferred to ICU, orders placed.      Eliel Phillips MD  PGY2, IM

## 2019-07-19 NOTE — PROCEDURES
"            Interventional Cardiology   Post Cath Note      Referring Physician: Vadim Ngo MD  Procedure: IABP placement   Indication: Shock     SUBJECTIVE:   Patient tolerated procedure well with no complications. IABP placed at bedside with ultrasound guidance and position confirmed with X ray. Procedure completed with minimal blood loss and no complications.   Cath Results:   Access:  L CFA      Post Cath Exam:   /88   Pulse 98   Temp 98.9 °F (37.2 °C) (Oral)   Resp (!) 21   Ht 5' 7" (1.702 m)   Wt 90.3 kg (199 lb 1.2 oz)   LMP 01/28/2014 (Approximate)   SpO2 100%   Breastfeeding? No   BMI 31.18 kg/m²     No unusual pain, hematoma, thrill or bruit at vascular access site.  Distal pulse present without signs of ischemia.    Assessment / Plan:     - Risks, benefits, and indications for the procedure were reviewed with patient. Consent was signed and placed in chart. Pt prepped and draped in sterile fashion. IABP placed at bedside with ultrasound guidance and position confirmed with X ray. Procedure completed with minimal blood loss and no complications.   - Continue supportive treatment.   - Further care by the primary team.      Juan Melgar MD  Interventional Cardiovascular Fellow  Pager: [phone]            "

## 2019-07-19 NOTE — NURSING
Hayde Sánchez and Ronald at bedside to place central line. Consents verified and time out performed. All VSS. WCTM.

## 2019-07-19 NOTE — ASSESSMENT & PLAN NOTE
-07/17, patient complaining of bilateral leg pain  -has refused heparin DVT prophylaxis throughout her admission due to a prior GI bleed  -US bilateral LEs was ordered and showed acute DVTs in bilateral lower extremities, both iliac and femoral veins.  -heparin infusion for treatment  -vascular surgery consulted for possible IVC filter placement

## 2019-07-19 NOTE — ASSESSMENT & PLAN NOTE
Right thigh veins: There is acute thrombus in the right iliac vein and right common femoral vein.  The right femoral, popliteal and greater saphenous veins are patent.    Right calf veins: The visualized calf veins are patent.    Left thigh veins: There is acute thrombus in the left iliac vein, the left common femoral vein, and the left femoral vein.  The left popliteal vein is patent.    Left calf veins: The visualized calf veins are patent.

## 2019-07-19 NOTE — CARE UPDATE
"RAPID RESPONSE NURSE NOTE     Admit Date: 2019  LOS: 8  Code Status: Full Code   Date of Consult: 2019  : 1970  Age: 48 y.o.  Weight:   Wt Readings from Last 1 Encounters:   19 90.3 kg (199 lb 1.2 oz)     Sex: female  Race: Black or    Bed: [address] [place] [address] A:   MRN: [id number]  Time Rapid Response Team page Received: 08  Time Rapid Response Team at Bedside: 08  Time Rapid Response Team left Bedside: 0853  Was the patient discharged from an ICU this admission?   no  Was the patient discharged from a PACU within last 24 hours?  no  Did the patient receive conscious sedation/general anesthesia within last 24 hours?  no  Was the patient in the ED within the past 24 hours?  no  Was the patient started on NIPPV within the past 24 hours?  no  Did this progress into an ARC or CPA:  no  Attending Physician: Vadim Ngo MD  Primary Service: [facility] 2  Consult Requested By: Vadim Ngo MD     SITUATION     Reason for Call: hypotension, increased SOB, tachycardia  Called to evaluate the patient for Circulatory    BACKGROUND     Why is the patient in the hospital?: Closed fracture of right tibial plateau    Patient has a past medical history of Abnormal finding on Pap smear, HPV DNA positive, Anemia associated with chronic renal failure, Blood type B+, Bulging discs - symptomatic , CAD (coronary artery disease), Cardiomyopathy, Diabetes mellitus, type 2, ESRD (end stage renal disease), FSGS (focal segmental glomerulosclerosis), Hyperlipidemia, Hypertension, Neuropathy, NSTEMI (non-ST elevated myocardial infarction), Obesity, Secondary hyperparathyroidism, renal, TIA (transient ischemic attack), and Uterine fibroid.    ASSESSMENT/INTERVENTIONS     /73   Pulse (!) 131   Temp 98.9 °F (37.2 °C) (Oral)   Resp 17   Ht 5' 7" (1.702 m)   Wt 90.3 kg (199 lb 1.2 oz)   LMP 2014 (Approximate)   SpO2 (!) 90%   Breastfeeding? No   BMI 31.18 kg/m²     What did you " find: Pt lying in bed, BP 70's/40's, 's, unable to obtain O2 sats at this time. Pt c/o increased SOB, nausea this AM, and chest pain that feels like pressure. Pt primary team at bedside. Critical care called and notified of pt. EKG obtained. EKG indicated ST elevation and possible Acute MI. Labs ordered and sent before arrival. RRT recommended troponin to be sent as well. ABG and Lactic obtained. NRB placed on pt. Pt transferred to ICU.    RECOMMENDATIONS     We recommend: Transfer to ICU    FOLLOW-UP/CONTINGENCY PLAN     Call the Rapid Response Nurse, Lily Russo RN at x [phone] for additional questions or concerns.    PHYSICIAN ESCALATION     Orders received and case discussed with Fiona Winterbottom, NP.    Disposition: Tx in ICU bed 16171.

## 2019-07-19 NOTE — EICU
1018 Dr. Hobson called for emergent ETT. Privileges verified. Time out done. Patient bagged with 100% with easy. 1028 Etomadate 10 mg per RN under Dr. Hobson guidance. 1029 Succinylcholine 100mg IVP. 1030 ETT inserted 8.0 @ 24cm. Lip. Color change, bilateral breaths ausculated. Connected to ventilator.

## 2019-07-19 NOTE — ASSESSMENT & PLAN NOTE
-holding home ASA and Plavix inially for procedure.  - Started patient on Heparin gtt for widespread extremities DVTs  - See STEMI for further assessment

## 2019-07-19 NOTE — PROGRESS NOTES
Patient transferred to TSU for SLED. ESRD team will sign off at this time. Acute nephrology team to follow pt for dialysis needs at this time.    BERNICE Polanco, AGNP-C  Nephrology  Pager: [phone]

## 2019-07-19 NOTE — SIGNIFICANT EVENT
Rapid Response Respiratory Therapy Note        Code Status: Full Code   : 1970  Age: 48 y.o.  Weight:   Wt Readings from Last 1 Encounters:   19 90.3 kg (199 lb 1.2 oz)     Sex: female  Race: Black or    Bed: 29459/90565 A:   MRN: [id number]  Time page Received: 0824  Time Rapid Response RT at Bedside: 0855  Time Rapid Response RT left Bedside: 0900  Report given to: ANDREZ Wilder RRT    SITUATION     Evaluated patient for: STEMI, hypotension    BACKGROUND     Patient has a past medical history of Abnormal finding on Pap smear, HPV DNA positive, Anemia associated with chronic renal failure, Blood type B+, Bulging discs - symptomatic , CAD (coronary artery disease), Cardiomyopathy, Diabetes mellitus, type 2, ESRD (end stage renal disease), FSGS (focal segmental glomerulosclerosis), Hyperlipidemia, Hypertension, Neuropathy, NSTEMI (non-ST elevated myocardial infarction), Obesity, Secondary hyperparathyroidism, renal, TIA (transient ischemic attack), and Uterine fibroid.  Pulmonary Hx: None  Clinically Significant Surgical Hx: None    ASSESSMENT     Pulse: Pulse: 98 Respiratory rate: Resp: (!) 21 Temperature: Temp: 98.9 °F (37.2 °C) BP: BP: 126/88 SpO2:SpO2: 100 %   Level of Consciousness: Level of Consciousness (AVPU): alert  Respiratory Effort: Respiratory Effort: Normal, Unlabored  Expansion/Accessory Muscle Usage: Expansion/Accessory Muscles/Retractions: expansion symmetric, no retractions, no use of accessory muscles  All Lung Field Breath Sounds: All Lung Fields Breath Sounds: diminished  DAISY Breath Sounds: diminished  Cough Type: Cough Type: assisted  Mobility at time of assessment: General Mobility: mildly impaired, generalized weakness  O2 Device/Concentration: O2 Device (Oxygen Therapy): ventilator   Flow (L/min): 4 Oxygen Concentration (%): 40  Most recent blood gas:   Recent Labs     19  0843   PH 7.413   PCO2 39.5   PO2 211*   HCO3 25.2   POCSATURATED 100   BE 1     PF  Ratio Calculator  P/F Ratio: 211  Current Respiratory Care Orders: Oxygen orders  NIPPV: No  Surgical airway: No  Vent: No  ETCO2 monitored:    Ambu at bedside: Ambu bag with the patient?: Yes, Adult Ambu    INTERVENTIONS/RECOMMENDATIONS   ?  Arrived to  Patient room GOKUL Stephens already at bedside obtaining ABG sample. Pt was on 100% NRB  Complaining of chest pain. Pt transferred to ICU for further monitoring.    Discussed plan of care with and primary RTANDREZ RRT    Disposition: Tx in ICU bed 57116.    FOLLOW-UP     Please call back the Rapid Response RT, Keaton Sanchez, RRT at x [phone] for any questions or concerns.

## 2019-07-19 NOTE — PROCEDURES
"Jenni Todd is a 48 y.o. female patient.    Temp: 98.9 °F (37.2 °C) (07/19/19 0853)  Pulse: (!) 131 (07/19/19 1041)  Resp: 17 (07/19/19 1041)  BP: 104/73 (07/19/19 1041)  SpO2: (!) 90 % (07/19/19 1041)  Weight: 90.3 kg (199 lb 1.2 oz) (07/07/19 0730)  Height: 5' 7" (170.2 cm) (07/11/19 0950)       Intubation  Date/Time: 7/19/2019 10:49 AM  Location procedure was performed: [facility] CARDIAC MEDICAL ICU ([facility])  Performed by: Kate Sotelo MD  Authorized by: Kate Sotelo MD   Assisting provider: Yareli Hobson MD  Pre-operative diagnosis: Hypoxic respiratory failure  Post-operative diagnosis: Hypoxic respiratory failure  Consent Done: Emergent Situation  Indications: respiratory failure and  hypoxemia  Intubation method: direct  Patient status: awake  Preoxygenation: nonrebreather mask  Sedatives: etomidate  Paralytic: succinylcholine  Laryngoscope size: Mac 4  Tube size: 8.0 mm  Tube type: cuffed  Number of attempts: 1  Cricoid pressure: no  Cords visualized: yes  Post-procedure assessment: chest rise and ETCO2 monitor  Breath sounds: equal and absent over the epigastrium  Cuff inflated: yes  ETT to lip: 24 cm  Tube secured with: ETT dye  Patient tolerance: Patient tolerated the procedure well with no immediate complications  Complications: No  Specimens: No  Implants: No      Kate Sotelo MD  [employer] [facility]  "

## 2019-07-19 NOTE — ASSESSMENT & PLAN NOTE
Patient has undifferentiated shock that is likely due to a cardiac cause.   - Stat EKG  -- Stat chemistries  -- Stat transfer to ICU  -- Levophed for hypotension for MAP.65  -- Morphine for pain x1  -- Stat consult to Cardiology

## 2019-07-19 NOTE — EICU
1045 Bilateral groin prepped per Dr. Melgar. Large sterile drape applied. US used to verify placement of artery. Perfusionist at bedside, Killian. 1050 Dr Ngo with Dr. Chang reviewing US for placement. 1052 Lidocaine injected into left groin. 1054 Needle inserted glide wire, needle removed. Dilator inserted, then removed. Shealth inserted, blood return, and flushed with saline. 1059 [place] wire through shealth IABP 40cc inserted removed glide wire intact. Awaiting CXR for reverification and proper placement. 1109 PCXR done for placement. 1112 IABP on Standby then restarted for completion of PCXR. IAPB inserted at 100 cm. Line sutured in biopatch applied. Tegaderm (large) applied.

## 2019-07-19 NOTE — SUBJECTIVE & OBJECTIVE
Past Medical History:   Diagnosis Date    Abnormal finding on Pap smear, HPV DNA positive     Anemia associated with chronic renal failure     on Epogen    Blood type B+     Bulging discs - symptomatic      CAD (coronary artery disease)     Cardiomyopathy 3/13/2019    Diabetes mellitus, type 2     ESRD (end stage renal disease) 2004    FSGS (focal segmental glomerulosclerosis)     with collapsing    Hyperlipidemia     Hypertension     Neuropathy     NSTEMI (non-ST elevated myocardial infarction) 3/29/2019    Obesity     Secondary hyperparathyroidism, renal     TIA (transient ischemic attack)     Uterine fibroid     small uterine        Past Surgical History:   Procedure Laterality Date    ANGIOGRAM, CORONARY ARTERY N/A 4/15/2019    Performed by Roland [facility]ier MD at [facility] CATH LAB    APPLICATION, EXTERNAL FIXATION DEVICE, LARGE, TIBIA- SYNTHES Right 2019    Performed by Corwin Lopez MD at [facility] OR 2ND FLR    CARDIAC CATHETERIZATION      PCI x 2     SECTION, CLASSIC      COLONOSCOPY N/A 2018    Performed by Rodrigue Russell MD at [facility] ENDO (2ND FLR)    DEBRIDEMENT-WOUND Left 2016    Performed by [facility]ressa Maddox MD at [facility] OR    DIALYSIS FISTULA CREATION      multiple fistulas and grafts before PD     EGD (ESOPHAGOGASTRODUODENOSCOPY) N/A 3/14/2019    Performed by Adolph [facility]ila MD at [facility] ENDO    EGD (ESOPHAGOGASTRODUODENOSCOPY) N/A 3/13/2019    Performed by Adolph Davila MD at [facility] ENDO    INCISION AND DRAINAGE (I&D), LABIAL N/A 2016    Performed by Harmony Fernandez MD at [facility] OR    INCISION AND DRAINAGE (I&D), LABIAL (ADD ON) Left 2016    Performed by Teressa Maddox MD at [facility] OR    INCISION AND DRAINAGE OF WOUND Left     VULVAR ABCESS WITH NECROSIS    Insertion, Catheter, Central Venous, Hemodialysis N/A 3/28/2019    Performed by Kimo Weeks MD at [facility] CATH LAB    Insertion, Catheter, Central Venous,  Hemodialysis N/A 3/18/2019    Performed by Kimo [facility]esk MD at [facility] CATH LAB    INSERTION, CATHETER, TUNNELED ABORTED Left 3/14/2019    Performed by Servando Solis MD at [facility] OR    INSERTION, GRAFT, ARTERIOVENOUS, RIGHT ARM Right 3/22/2019    Performed by BESSIE Wynn III, MD at [facility] OR 2ND FLR    INSERTION, INTRA-AORTIC BALLOON PUMP  4/15/2019    Performed by Roland Napier MD at [facility] CATH LAB    Left heart cath Left 4/15/2019    Performed by Roland Napier MD at [facility] CATH LAB    ORIF, FRACTURE, TIBIA, PLATEAU Right 7/11/2019    Performed by Dominick Desai MD at [facility] OR 2ND FLR    ORIF, HIP Left 9/13/2018    Performed by Tevin Grullon MD at [facility] [place] OR    PERITONEAL CATHETER INSERTION      PERMCATH REWIRE- TUNNELED CATH REWIRE Left 11/13/2017    Performed by Baldev Munroe MD at [facility] [place] CATH LAB    PERMCATH REWIRE- TUNNELED CATH REWIRE N/A 10/5/2017    Performed by Baldev Munroe MD at [facility] [place] CATH LAB    REMOVAL, CATHETER, DIALYSIS, PERITONEAL N/A 3/14/2019    Performed by Servando Solis MD at [facility] OR    REMOVAL, EXTERNAL FIXATION DEVICE Right 7/11/2019    Performed by Dominick Desai MD at [facility] OR 2ND FLR    REPLACEMENT, CATHETER, DIALYSIS, OVER GUIDEWIRE, USING EXISTING VENOUS ACCESS N/A 6/27/2019    Performed by Kimo Weeks MD at [facility] CATH LAB    REPLACEMENT, CATHETER, DIALYSIS, OVER GUIDEWIRE, USING EXISTING VENOUS ACCESS Left 5/24/2019    Performed by Baldev Munroe MD at [facility] [place] CATH LAB    UMBILICAL HERNIA REPAIR      Venogram, possible intervention Right 3/20/2019    Performed by BESSIE Wynn III, MD at [facility] CATH LAB       Review of patient's allergies indicates:   Allergen Reactions    Flagyl [metronidazole hcl] Nausea And Vomiting    Clindamycin Diarrhea       No current facility-administered medications on file prior to encounter.      Current Outpatient Medications on File Prior to Encounter   Medication Sig    aspirin (ECOTRIN) 81 MG EC tablet  "Take 1 tablet (81 mg total) by mouth once daily.    atorvastatin (LIPITOR) 40 MG tablet Take 40 mg by mouth every evening.     clopidogrel (PLAVIX) 75 mg tablet Take 1 tablet (75 mg total) by mouth once daily.    ergocalciferol (ERGOCALCIFEROL) 50,000 unit Cap Take 50,000 Units by mouth every 7 days. Sunday    famotidine (PEPCID) 20 MG tablet Take 1 tablet (20 mg total) by mouth nightly as needed for Heartburn.    ferrous sulfate 325 (65 FE) MG EC tablet Take 1 tablet (325 mg total) by mouth 2 (two) times daily. (Patient taking differently: Take 325 mg by mouth once daily. )    gabapentin (NEURONTIN) 300 MG capsule Take 1 capsule (300 mg total) by mouth 2 (two) times daily.    levoFLOXacin (LEVAQUIN) 250 MG tablet Take 1 tablet (250 mg total) by mouth once daily.    magnesium oxide (MAG-OX) 400 mg (241.3 mg magnesium) tablet Take 1 tablet (400 mg total) by mouth 3 (three) times daily.    midodrine (PROAMATINE) 10 MG tablet One to two tab po q 8 hours. May take dose during dialysis if bp drops below SBP 90. (Patient taking differently: 20 mg 3 (three) times daily. May take dose during dialysis if bp drops below SBP 90.)    mupirocin (BACTROBAN) 2 % ointment Apply topically 2 (two) times daily.    ondansetron (ZOFRAN-ODT) 4 MG TbDL Take 2 tablets (8 mg total) by mouth every 8 (eight) hours as needed.    ONETOUCH VERIO Strp Use 1 strip 3 times daily to test Blood sugar    pen needle, diabetic 31 gauge x 3/16" Ndle 1 each by Misc.(Non-Drug; Combo Route) route 4 (four) times daily before meals and nightly.    sevelamer carbonate (RENVELA) 800 mg Tab Take 1 tablet (800 mg total) by mouth 3 (three) times daily with meals.    vitamin renal formula, B-complex-vitamin c-folic acid, (B COMPLEX-C-FOLIC ACID) 1 mg Cap Take 1 capsule by mouth once daily. 1 Capsule Oral Every day    insulin aspart U-100 (NOVOLOG) 100 unit/mL (3 mL) InPn pen Inject 0-5 Units into the skin before meals and at bedtime as needed " (Hyperglycemia).     Family History     Problem Relation (Age of Onset)    Cancer Maternal Grandmother, Paternal Grandfather    Diabetes Maternal Aunt, Paternal Aunt        Tobacco Use    Smoking status: Never Smoker    Smokeless tobacco: Never Used   Substance and Sexual Activity    Alcohol use: No     Comment: Pt reports some social use of about 1-2 drinks about every six months.    Drug use: No    Sexual activity: Not Currently     Partners: Male     Birth control/protection: None     Review of Systems   All other systems reviewed and are negative.    Objective:     Vital Signs (Most Recent):  Temp: 98.9 °F (37.2 °C) (07/19/19 0853)  Pulse: (!) 128 (07/19/19 0925)  Resp: (!) 33 (07/19/19 0925)  BP: (!) 71/51 (07/19/19 0925)  SpO2: (unable to obtain) (07/19/19 0915) Vital Signs (24h Range):  Temp:  [97.6 °F (36.4 °C)-98.9 °F (37.2 °C)] 98.9 °F (37.2 °C)  Pulse:  [] 128  Resp:  [16-35] 33  SpO2:  [94 %-100 %] 99 %  BP: ()/(33-66) 71/51     Weight: 90.3 kg (199 lb 1.2 oz)  Body mass index is 31.18 kg/m².    SpO2: (unable to obtain)  O2 Device (Oxygen Therapy): Non Rebreather Mask      Intake/Output Summary (Last 24 hours) at 7/19/2019 1039  Last data filed at 7/19/2019 0915  Gross per 24 hour   Intake 1115.2 ml   Output --   Net 1115.2 ml       Lines/Drains/Airways     Central Venous Catheter Line                 Hemodialysis Catheter right femoral -- days         Percutaneous Central Line Insertion/Assessment - double lumen  06/27/19 1438 right femoral vein;right femoral 21 days          Drain                 Hemodialysis AV Fistula Left upper arm -- days         Hemodialysis AV Graft Right upper arm -- days         NG/OG Tube 07/19/19 0928 nasogastric Right nostril less than 1 day          Airway                 Airway - Non-Surgical 07/19/19 1030 Endotracheal Tube less than 1 day          Peripheral Intravenous Line                 Peripheral IV - Single Lumen 07/06/19 0823 22 G Right Wrist 13  days         Peripheral IV - Double Lumen 07/11/19 1013 20 G Distal;Left;Posterior Forearm 8 days         Peripheral IV - Single Lumen 07/17/19 1203 20 G;1 3/4 in Right;Anterior Forearm 1 day                Physical Exam   Constitutional: She is oriented to person, place, and time. She appears well-nourished. She appears distressed.   HENT:   Head: Atraumatic.   Eyes: EOM are normal. No scleral icterus.   Neck: Neck supple. JVD present.   Cardiovascular: Regular rhythm. Tachycardia present.   Pulmonary/Chest: She has no wheezes. She has rales.   Mildly increaed WOB   Abdominal: Soft. Bowel sounds are normal. She exhibits no distension. There is no tenderness.   Musculoskeletal:   L lateral shin dressing in place, 1-2+ LLE edema.   Neurological: She is alert and oriented to person, place, and time. No cranial nerve deficit.   Skin: Skin is warm and dry. No erythema.   Psychiatric: She has a normal mood and affect.       Significant Labs:   ABG:   Recent Labs   Lab 07/19/19  0843   PH 7.413   PCO2 39.5   HCO3 25.2   POCSATURATED 100   BE 1   , BMP:   Recent Labs   Lab 07/18/19  0509 07/19/19  0819 07/19/19  0903   * 192* 218*   * 136 134*   K 3.7 3.8 3.9   CL 91* 92* 91*   CO2 19* 19* 25   BUN 31* 38* 39*   CREATININE 4.5* 5.0* 5.1*   CALCIUM 11.2* 11.6* 11.4*   MG 2.2 2.3  --    , CMP   Recent Labs   Lab 07/18/19  0509 07/19/19  0819 07/19/19  0903   * 136 134*   K 3.7 3.8 3.9   CL 91* 92* 91*   CO2 19* 19* 25   * 192* 218*   BUN 31* 38* 39*   CREATININE 4.5* 5.0* 5.1*   CALCIUM 11.2* 11.6* 11.4*   PROT  --   --  7.2   ALBUMIN 2.2* 2.2* 2.2*   BILITOT  --   --  0.5   ALKPHOS  --   --  127   AST  --   --  76*   ALT  --   --  <5*   ANIONGAP 25* 25* 18*   ESTGFRAFRICA 12.4* 10.9* 10.7*   EGFRNONAA 10.8* 9.5* 9.3*   , CBC   Recent Labs   Lab 07/18/19  0509 07/19/19  0819 07/19/19  0903   WBC 19.17* 16.64* 16.45*   HGB 9.0* 9.0* 8.3*   HCT 28.6* 29.8* 27.4*    232 235   , INR   Recent Labs    Lab 07/19/19  0903   INR 1.2   , Lipid Panel No results for input(s): CHOL, HDL, LDLCALC, TRIG, CHOLHDL in the last 48 hours. and Troponin   Recent Labs   Lab 07/19/19  0819 07/19/19  0903   TROPONINI 7.239* 7.014*       Significant Imaging: Echocardiogram:   2D echo with color flow doppler:   Results for orders placed or performed during the hospital encounter of 11/03/16   2D echo with color flow doppler   Result Value Ref Range    QEF 60 55 - 65    Mitral Valve Regurgitation MILD     Diastolic Dysfunction No     Aortic Valve Stenosis TRIVIAL TO MILD     Narrative    Date of Procedure: 11/03/2016        TEST DESCRIPTION       Aorta: The aortic root is normal in size, measuring 3.1 cm at sinotubular junction and 3.1 cm at Sinuses of Valsalva. The proximal ascending aorta is normal in size, measuring 3.3 cm across.     Left Atrium: The left atrial volume index is normal, measuring 29.33 cc/m2.     Left Ventricle: The left ventricle is normal in size, with an end-diastolic diameter of 4.6 cm, and an end-systolic diameter of 3.2 cm. Septal wall thickness is upper limit of normal in size, with the septum measuring 1.1 cm and the posterior wall   measuring 1.0 cm across. Relative wall thickness was normal at 0.43, and the LV mass index was 93.8 g/m2 consistent with normal left ventricular mass. Global left ventricular systolic function appears normal. Visually estimated ejection fraction is   60-65%. The LV Doppler derived stroke volume equals 72.0 ccs.   The E/e'(lat) is 8, consistent with normal diastolic function.     Right Atrium: The right atrium is upper limit of normal, measuring 4.9 cm in length and 3.9 cm in width in the apical view.     Right Ventricle: The right ventricle is normal in size measuring 2.4 cm at the base in the apical right ventricle-focused view. Global right ventricular systolic function appears normal. Tricuspid annular plane systolic excursion (TAPSE) is 1.9 cm.   Tissue Doppler-derived  tricuspid annular peak systolic velocity (S prime) is 13.3 cm/s.     Aortic Valve:  The aortic valve is moderately sclerotic with mildly restricted leaflet mobility. The aortic valve is tri-leaflet in structure. The peak velocity obtained across the aortic valve is 2.1 m/s, which translates to a peak gradient of 18.0   mmHg. The mean gradient is 10.0 mmHg. Using a left ventricular outflow tract diameter of 2.4 cm, a left ventricular outflow tract velocity time integral of 16 cm, and a peak instantaneous transvalvular velocity time integral of 37 cm, the calculated   aortic valve area is 1.95 cm2, consistent with trivial to mild aortic stenosis.     Mitral Valve:  There is mild mitral regurgitation. There is marked mitral annular calcification.     IVC: The IVC is not visualized.     Intracavitary: There is no evidence of pericardial effusion, intracavity mass, thrombi, or vegetation.         CONCLUSIONS     1 - Normal left ventricular systolic function (EF 60-65%).     2 - Normal left ventricular diastolic function.     3 - Normal right ventricular systolic function .     4 - Trivial to mild aortic stenosis, ARNOLD = 1.95 cm2, peak velocity = 2.1 m/s, mean gradient = 10.0 mmHg.     5 - Mild mitral regurgitation.             This document has been electronically    SIGNED BY: Tc Dominguez MD On: 11/03/2016 11:13    and Transthoracic echo (TTE) complete (Cupid Only):   Results for orders placed or performed during the hospital encounter of 04/12/19   Transthoracic echo (TTE) complete (Cupid Only)   Result Value Ref Range    Ascending aorta 2.53 cm    STJ 2.21 cm    AV mean gradient 10.27 mmHg    Ao peak otoniel 1.91 m/s    Ao VTI 22.93 cm    IVRT 0.08 msec    IVS 0.93 0.6 - 1.1 cm    LA size 4.33 cm    Left Atrium Major Axis 5.99 cm    Left Atrium Minor Axis 5.99 cm    LVIDD 5.23 3.5 - 6.0 cm    LVIDS 4.05 (A) 2.1 - 4.0 cm    LVOT diameter 1.96 cm    LVOT peak VTI 11.29 cm    PW 1.04 0.6 - 1.1 cm    MV Peak E Otoniel 1.07 m/s     RA Major Axis 4.73 cm    RA Width 3.54 cm    RVDD 3.89 cm    Sinus 2.57 cm    TAPSE 1.15 cm    TR Max Otoniel 2.71 m/s    TDI LATERAL 0.10     TDI SEPTAL 0.10     LA WIDTH 4.53 cm    LV Diastolic Volume 130.98 mL    LV Systolic Volume 72.11 mL    RV S' 8.94 m/s    LVOT peak otoniel 0.481074853310725 m/s    LV LATERAL E/E' RATIO 10.70     LV SEPTAL E/E' RATIO 10.70     FS 23 %    LA volume 99.87 cm3    LV mass 192.12 g    Left Ventricle Relative Wall Thickness 0.40 cm    AV valve area 1.48 cm2    AV Velocity Ratio 0.51     AV index (prosthetic) 0.49     Mean e' 0.10     LVOT area 3.02 cm2    LVOT stroke volume 34.05 cm3    AV peak gradient 14.59 mmHg    E/E' ratio 10.70     LV Systolic Volume Index 34.7 mL/m2    LV Diastolic Volume Index 62.94 mL/m2    LA Volume Index 48.0 mL/m2    LV Mass Index 92.3 g/m2    Triscuspid Valve Regurgitation Peak Gradient 29.38 mmHg    BSA 2.14 m2    Right Atrial Pressure (from IVC) 3 mmHg    TV rest pulmonary artery pressure 32 mmHg    Narrative    · Severely decreased left ventricular systolic function. The estimated   ejection fraction is 25%  · Left ventricular diastolic dysfunction.  · Local segmental wall motion abnormalities.  · Low normal right ventricular systolic function.  · Severe left atrial enlargement.  · Mild-to-moderate aortic valve stenosis.  · Aortic valve area is 1.48 cm2; peak velocity is 1.91 m/s; mean gradient   is 10.27 mmHg.  · Moderate mitral sclerosis.  · Moderate to moderate-severe (2-3+) mitral regurgitation.  · Mild to moderate tricuspid regurgitation.  · Mild pulmonic regurgitation.  · Normal central venous pressure (3 mm Hg).  · The estimated PA systolic pressure is 32 mm Hg       and EKG: sinus tachycardia, 1 mm ИВАН II/III/aVF inferior leads with reciprocal depression

## 2019-07-19 NOTE — ASSESSMENT & PLAN NOTE
ESRD on iHD  Davita-Metarie  Nocturnal dialysis  RIJ tunneled dialysis catheter (Placed 06/27)  Reports an EDW ~ 90.6 kg  Scheduled midodrine, recently increased to 20 mg TID    Plan/Recommendations:  -Will schedule RRT/SLED today once patient is more stable  -Cards eval ASAP  -Strict I/Os   -will continue to f/u  -renal diet when cleared by primary team

## 2019-07-20 PROBLEM — J90 PLEURAL EFFUSION: Status: ACTIVE | Noted: 2019-01-01

## 2019-07-20 NOTE — PROGRESS NOTES
[facility]  Cardiology  Progress Note    Patient Name: Jenni Todd  MRN: [id number]  Admission Date: 7/6/2019  Hospital Length of Stay: 9 days  Code Status: DNR   Attending Physician: Vadim Ngo MD   Primary Care Physician: Michael Tan Iii, MD  Expected Discharge Date: 7/22/2019  Principal Problem:Shock    Subjective:       Interval History: Continued deterioration necessitating escalation of pressors.     ROS  Objective:     Vital Signs (Most Recent):  Temp: 98.8 °F (37.1 °C) (07/20/19 1500)  Pulse: (!) 119 (07/20/19 1500)  Resp: (!) 33 (07/20/19 1500)  BP: (!) 76/52 (07/20/19 1500)  SpO2: 96 % (07/20/19 1500) Vital Signs (24h Range):  Temp:  [97.7 °F (36.5 °C)-98.8 °F (37.1 °C)] 98.8 °F (37.1 °C)  Pulse:  [] 119  Resp:  [15-33] 33  SpO2:  [90 %-100 %] 96 %  BP: ()/(44-68) 76/52     Weight: 100 kg (220 lb 7.4 oz)  Body mass index is 33.52 kg/m².     SpO2: 96 %  O2 Device (Oxygen Therapy): ventilator      Intake/Output Summary (Last 24 hours) at 7/20/2019 1532  Last data filed at 7/20/2019 1500  Gross per 24 hour   Intake 5693.84 ml   Output 3582 ml   Net 2111.84 ml       Lines/Drains/Airways     Central Venous Catheter Line                 Hemodialysis Catheter 07/11/19 right femoral 9 days         Percutaneous Central Line Insertion/Assessment - triple lumen  07/19/19 1500 left subclavian 1 day          Drain                 Hemodialysis AV Fistula Left upper arm -- days         Hemodialysis AV Graft Right upper arm -- days         NG/OG Tube 07/19/19 0928 nasogastric Right nostril 1 day          Airway                 Airway - Non-Surgical 07/19/19 1030 Endotracheal Tube 1 day          Line                 IABP 07/19/19 1110 8.0 Fr. 40 mL 1 day          Peripheral Intravenous Line                 Peripheral IV - Single Lumen 07/17/19 1203 20 G;1 3/4 in Right;Anterior Forearm 3 days         Peripheral IV - Single Lumen 07/17/19 1500 20 G Anterior;Right Wrist 3 days                 Physical Exam   Constitutional: She appears well-developed and well-nourished. She has a sickly appearance. No distress. She is sedated and intubated.   HENT:   Head: Atraumatic.   Eyes: EOM are normal. No scleral icterus.   Neck: Neck supple. No JVD present.   Cardiovascular: Regular rhythm. Tachycardia present.   Pulmonary/Chest: She is intubated. She has no wheezes. She has no rales.   Left chest central line   Abdominal: Soft. Bowel sounds are normal. She exhibits no distension. There is no tenderness.   Musculoskeletal:   L lateral shin dressing in place, 1-2+ LLE edema.   Skin: Skin is dry. No erythema.       Significant Labs:   ABG:   Recent Labs   Lab 07/20/19  0607 07/20/19  1029 07/20/19  1137   PH 7.263* 7.129* 7.198*   PCO2 33.4* 49.1* 37.0   HCO3 15.1* 16.3* 14.4*   POCSATURATED 94* 57* 94*   BE -12 -13 -14   , CMP   Recent Labs   Lab 07/19/19  0903  07/19/19  2128 07/20/19  0318 07/20/19  1414   *   < > 132* 131*  131* 134*   K 3.9   < > 4.3 4.2  4.2 4.1   CL 91*   < > 92* 90*  90* 91*   CO2 25   < > 13* 14*  14* 14*   *   < > 121* 96  96 89   BUN 39*   < > 40* 37*  37* 12   CREATININE 5.1*   < > 5.1* 4.7*  4.7* 1.9*   CALCIUM 11.4*   < > 11.4* 11.2*  11.2* 9.3   PROT 7.2  --   --   --   --    ALBUMIN 2.2*   < > 1.9* 1.8*  1.8* 1.7*   BILITOT 0.5  --   --   --   --    ALKPHOS 127  --   --   --   --    AST 76*  --   --   --   --    ALT <5*  --   --   --   --    ANIONGAP 18*   < > 27* 27*  27* 29*   ESTGFRAFRICA 10.7*   < > 10.7* 11.8*  11.8* 35.2*   EGFRNONAA 9.3*   < > 9.3* 10.2*  10.2* 30.5*    < > = values in this interval not displayed.   , CBC   Recent Labs   Lab 07/20/19  0318  07/20/19  0746 07/20/19  1125   WBC 19.49*  19.49*  --  20.60* 23.05*   HGB 9.6*  9.6*  --  9.3* 9.1*   HCT 32.3*  32.3*   < > 30.5* 30.4*     183  --  161 150    < > = values in this interval not displayed.   , INR   Recent Labs   Lab 07/19/19  0903   INR 1.2    and Troponin    Recent Labs   Lab 07/19/19  0819 07/19/19  0903 07/19/19 2021   TROPONINI 7.239* 7.014* 10.115*       Significant Imaging: CT scan: CT ABDOMEN PELVIS WITH CONTRAST:   Results for orders placed or performed during the hospital encounter of 07/06/19   CT Abdomen Pelvis With Contrast    Narrative    EXAMINATION:  CT ABDOMEN PELVIS WITH CONTRAST    CLINICAL HISTORY:  Nausea, vomiting, diarrhea; history renal failure.    Hemodialysis.  Coronary artery disease.  Ejection fraction of 25%.    TECHNIQUE:  Low dose axial images, sagittal and coronal reformations were obtained from the lung bases to the pubic symphysis following the IV administration of 100 mL of Omnipaque 350 without oral contrast    COMPARISON:  06/03/2019 and 03/17/2019 CT    FINDINGS:  Chest: The visualized heart is mildly enlarged.  No pericardial effusion.  There extensive three-vessel coronary atherosclerotic calcification.  There is a large left pleural effusion and small right pleural effusion.  There is compressive atelectasis of the lung bases.  There is a tiny left pneumothorax unchanged from 22:19 chest CT of uncertain etiology.  No interstitial edema.    Abdomen: The liver, the spleen, pancreas and adrenal glands appear normal.  Kidneys show marked atrophy unchanged.  Gallbladder appears normal.  Common bile duct measures about 6 mm probably within normal limits.  The hepatic veins and portal veins and SMV and splenic vein show normal enhancement.    There is a aortic balloon pump in place with gas inflation at time of imaging evident.  This is from left femoral artery approach.  No ureteral dilation or calculus visualized.    Pelvis: The urinary bladder appears normal.  The uterus and adnexal regions are grossly normal.  No inguinal hernia or pelvic adenopathy found.  There is a catheter in the right femoral vein extending to the right atrial lumen.  A nasogastric tube terminates at the stomach fundus.    Bowel and mesentery: There is a large  area of anterior abdominal ascites which is partially walled-off appearing similar with 06/03/2019.  Question of this is residual from previous peritoneal dialysis.  There are numerous areas of mesenteric and retroperitoneal calcifications again perhaps chronic changes from previous peritoneal dialysis.    No bowel dilation or pattern of ileus or obstruction.  There are few small diverticula of the sigmoid colon without acute diverticulitis.  No definite colonic wall thickening.  The terminal ileum is probably axial image 72 and appears normal.  The appendix is not definitely identified.  There is mild fluid in the transverse colon without distension of uncertain significance.  No abscess collection or intraperitoneal free air.    Skeleton: There is 3 cannulated screws of the left femoral neck without acute fracture.  No osteonecrosis.  Sacrum and SI joints are normal for age.  No rib fracture found.  No compression deformity or subluxation of the visualized spine.  No endplate erosion.      Impression    Large left pleural effusion small right pleural effusion with associated compressive atelectasis.  Tiny left pneumothorax potentially related to recent left subclavian catheter placement.  Surveillance chest films may be warranted.    Large quantity of abdominal ascites similar with 06/03/2019 uncertain etiology.    Severe bilateral renal atrophy unchanged.    Aortic balloon pump.  Cardiomegaly and three-vessel coronary disease.    No bowel dilation or acute inflammatory process found.    Findings of the tiny left pneumothorax or called to Katlyn the patient's nurse at 23:07 on date of exam.      Electronically signed by: Pily Tao  Date:    07/19/2019  Time:    23:10    and CT ABDOMEN PELVIS WITHOUT CONTRAST: No results found for this visit on 07/06/19.    Assessment and Plan:       * Shock  48F with ESRD, multivessel CAD (s/p NSTEMI 4/2019, 99% mid LAD, 100% distal RCA, 80% prox Lcx), extensive acute  iliofemoral DVT,  DM2, HTN, HLD, obesity hospitalized with tibial fx s/p ex-fix on 7/6, transferred to ICU this AM with shock and inferior STEMI. Known extensive CAD, thallium scan from April with fixed 25% apical defect indicative of nonviable myocardium. ST elevations resolved with supportive care suggestive of demand ischemia due to shock, hemodynamics suggestive of progressive cardiogenic shock with clinical worsening despite IABP, antibiotics, vasopressor and inotrope support with associated increases in abdominal and pleural effsions.    -continue 1:1 IABP  -increase vasopressors to maintain MAP >65  -trend CVP and SVO2  -strict ins and outs  -volume management as per nephrology, guided by hemodynamics  -continue vanc/zosyn  -very poor prognosis discussed with family, decided on DNR with medication support escalation only  -palliative care consulted.    Pleural effusion  Progressive pleural effusion L>R since admission likely from volume overload/uremia, now large enough to suggest that it may be hemodynamically significant. Have discussed the option of thoracentesis with family -- that this may slightly prolong survival but is unlikely to change outcome, and the decision is to leave it as is.    Acute deep vein thrombosis (DVT) of proximal vein of both lower extremities  Hypercoagulable state with acute thromboses in the legs and chronic occlusions of fistulas and IJ. Low suspicion of PE at this time based on echo findings.    - continue heparin drip    Closed fracture of right tibial plateau  Healing, recovering, will defer additional management or orthopedics    CAD (coronary artery disease)  Appears to be stable, severe CAD exacerbated by shock    - continue heparin drip      Anemia in chronic kidney disease, on chronic dialysis  ESRD on dialysis, complicated by pro-thrombotic state    - continue dialysis as per nephrology input to assist in toxin clearance  - volume management as above    Diabetic  neuropathy associated with type 2 diabetes mellitus  - sliding scale insulin        VTE Risk Mitigation (From admission, onward)        Ordered     Place sequential compression device  Until discontinued      07/19/19 1046     IP VTE HIGH RISK PATIENT  Once      07/19/19 1046     heparin 25,000 units in dextrose 5% 250 mL (100 units/mL) infusion HIGH INTENSITY nomogram - [facility]  Continuous      07/17/19 0942     heparin 25,000 units in dextrose 5% (100 units/ml) IV bolus from bag - ADDITIONAL PRN BOLUS - 60 units/kg  As needed (PRN)      07/17/19 0942     heparin 25,000 units in dextrose 5% (100 units/ml) IV bolus from bag - ADDITIONAL PRN BOLUS - 30 units/kg  As needed (PRN)      07/17/19 0942     heparin (porcine) injection 5,000 Units  Every 8 hours      07/09/19 1803     heparin (porcine) injection 5,000 Units  Every 8 hours      07/08/19 1415     heparin (porcine) injection 1,000 Units  As needed (PRN)      07/08/19 1210     Place sequential compression device  Until discontinued      07/06/19 1159          Héctor Cardenas MD  Cardiology  [facility]

## 2019-07-20 NOTE — PLAN OF CARE
Problem: Adult Inpatient Plan of Care  Goal: Plan of Care Review  Outcome: Ongoing (interventions implemented as appropriate)  Pt to SICU [id number] as rapid response from floor. Emergently intubated and IABP placed at bedside. Currently O2 sats >95% on AC 30% 5 PEEP. Afebrile, NSR, Augmented BP on IABP >70 per cardiology orders. IABP 1:1 ECG triggered. Gtts: heparin @ 12.5 u/kg/hr, aPTT therapeutic, propofol @ 35 mcg/kg/min, precedex @ 0.6, levo @ 0.12. Titrating propofol off and precedex on. R NGT placed upon arrival, total output 700 brown. Left subclavian triple lumen placed and CXR verified placement. ECHO done at bedside, EF=30%. 1 U PRBC given. CBCs checked q4. Pt remains anuric. Plan to go to CT for scan on abdomen/pelvis and head. Skin CDI. Restraints initiated upon intubation, skin checked q2 hours and free of breakdown. POC reviewed with family and all questions answered. WCTM.

## 2019-07-20 NOTE — ASSESSMENT & PLAN NOTE
Hypercoagulable state with acute thromboses in the legs and chronic occlusions of fistulas and IJ. Low suspicion of PE at this time based on echo findings.    - continue heparin drip

## 2019-07-20 NOTE — PROGRESS NOTES
SLED x 10hrs initiated via right femoral tunneled PC, sluggish flows but able to keep BFR to 200cc/min. UF rate at 200-250cc/hr.    Time off: 0900H

## 2019-07-20 NOTE — PROGRESS NOTES
07/20/2019  Carl Pruett    Current provider:  Vadim Ngo MD      I, Carl Pruett, rounded on Jenni Todd to ensure all mechanical assist device settings (IABP or VAD) were appropriate and all parameters were within limits.  I was able to ensure all back up equipment was present, the staff had no issues, and the Perfusion Department daily rounding was complete.    11:26 AM

## 2019-07-20 NOTE — ASSESSMENT & PLAN NOTE
48 y.o. female POD8 s/p R tibial plateau ORIF    Pain control: multimodal  PT/OT: NWB RLE  DVT PPx: therapeutic heparin drip for DVT, FCDs at all times when not ambulating  Abx: vanc, zosyn  Labs: lactate 12.2, WBC 20.6, Hb 9.3  Drain: none  Wiggins: none    Dispo: Patient is decompensating. Continue ICU care. Prognosis guarded.

## 2019-07-20 NOTE — SUBJECTIVE & OBJECTIVE
Principal Problem:Shock    Principal Orthopedic Problem: same    Interval History: Patient seen and examined at bedside.  STEMI overnight.  Intubated and transferred to ICU.  IABP placed.    Review of patient's allergies indicates:   Allergen Reactions    Flagyl [metronidazole hcl] Nausea And Vomiting    Clindamycin Diarrhea       Current Facility-Administered Medications   Medication    0.9%  NaCl infusion (CRRT USE ONLY)    0.9%  NaCl infusion (for blood administration)    0.9%  NaCl infusion (for blood administration)    0.9%  NaCl infusion    acetaminophen tablet 650 mg    aspirin suppository 300 mg    atorvastatin tablet 40 mg    chlorhexidine 0.12 % solution 15 mL    dexmedetomidine (PRECEDEX) 400mcg/100mL 0.9% NaCL infusion    dextrose 10% (D10W) Bolus    dextrose 10% (D10W) Bolus    epoetin adonay-epbx injection 5,800 Units    famotidine (PF) injection 20 mg    famotidine tablet 20 mg    glucagon (human recombinant) injection 1 mg    glucose chewable tablet 16 g    glucose chewable tablet 24 g    heparin (porcine) injection 1,000 Units    heparin 25,000 units in dextrose 5% (100 units/ml) IV bolus from bag - ADDITIONAL PRN BOLUS - 30 units/kg    heparin 25,000 units in dextrose 5% (100 units/ml) IV bolus from bag - ADDITIONAL PRN BOLUS - 60 units/kg    heparin 25,000 units in dextrose 5% 250 mL (100 units/mL) infusion HIGH INTENSITY nomogram - [facility]    insulin aspart U-100 pen 0-5 Units    magnesium sulfate 2g in water 50mL IVPB (premix)    midodrine tablet 10 mg    midodrine tablet 20 mg    norepinephrine 4 mg in dextrose 5% 250 mL infusion (premix) (titrating)    ondansetron injection 4 mg    oxyCODONE immediate release tablet 5 mg    pantoprazole injection 40 mg    piperacillin-tazobactam 4.5 g in sodium chloride 0.9% 100 mL IVPB (ready to mix system)    promethazine (PHENERGAN) 6.25 mg in dextrose 5 % 50 mL IVPB    propofol (DIPRIVAN) 10 mg/mL infusion    sodium chloride  "0.9% flush 10 mL    sodium phosphate 20.01 mmol in dextrose 5 % 250 mL IVPB    sodium phosphate 30 mmol in dextrose 5 % 250 mL IVPB    sodium phosphate 39.99 mmol in dextrose 5 % 250 mL IVPB     Objective:     Vital Signs (Most Recent):  Temp: 98.5 °F (36.9 °C) (07/19/19 1901)  Pulse: 75 (07/19/19 2102)  Resp: (!) 27 (07/19/19 2102)  BP: (!) 83/54 (07/19/19 2030)  SpO2: 100 % (07/19/19 2102) Vital Signs (24h Range):  Temp:  [98.3 °F (36.8 °C)-98.9 °F (37.2 °C)] 98.5 °F (36.9 °C)  Pulse:  [] 75  Resp:  [16-37] 27  SpO2:  [90 %-100 %] 100 %  BP: ()/(33-88) 83/54     Weight: 90.3 kg (199 lb)  Height: 5' 8" (172.7 cm)  Body mass index is 30.26 kg/m².        Ortho/SPM Exam     Intubated  Sedated     MSK:    Stage one right ischial ulcer without drainage or SOI    RLE:  NV exam unable to be performed 2/2 sedation  Compartments soft  Aquacel with 1x1cm area of drainage, no change  Proximal pin site gauze saturated    Significant Labs:   CBC:   Recent Labs   Lab 07/19/19  1134 07/19/19  1524 07/19/19 2021   WBC 11.93 11.65 13.63*   HGB 10.1* 11.2* 10.4*   HCT 32.2* 36.3* 33.3*    175 215     CMP:   Recent Labs   Lab 07/19/19  0819 07/19/19  0903 07/19/19  1257    134* 132*   K 3.8 3.9 3.8   CL 92* 91* 92*   CO2 19* 25 22*   * 218* 193*   BUN 38* 39* 39*   CREATININE 5.0* 5.1* 5.0*   CALCIUM 11.6* 11.4* 11.1*   PROT  --  7.2  --    ALBUMIN 2.2* 2.2* 2.0*   BILITOT  --  0.5  --    ALKPHOS  --  127  --    AST  --  76*  --    ALT  --  <5*  --    ANIONGAP 25* 18* 18*   EGFRNONAA 9.5* 9.3* 9.5*       Significant Imaging: I have reviewed all pertinent imaging results/findings.  "

## 2019-07-20 NOTE — NURSING
Dr. Cardenas at bedside to discuss pt condition with family. Discussed code status with sister (POA). Updated pt code status in epic and chart with 2 signatures. Will continue to escalate with pressors/mechanical ventilation.

## 2019-07-20 NOTE — PROGRESS NOTES
Informed Nephro oncall re: patient's clotting issues on crrt 45mins after starting. Patient is currently on Systemic Heparin 13.2ml/hr    Will wait for further orders.

## 2019-07-20 NOTE — HOSPITAL COURSE
This is a 48 woman with hx of ESRD on HD (R femoral trialysis), numerous DVT with associated occlusion of the RIJ and LIJ, DMII, HTN, HLD,GIB, 3V CAD that is not able to be revascularized, and obesity. She initially presented 07/06 after a fall, found to have R tibial fracture with Ex-fix on 7/6 and ORIF on 07/11. Post-op she had several issues that include non-compliance with DVT prophylaxis, thrombosis of her catheter (and prior thrombosis of her fistulas), hypotension after HD requiring midodrine, and persisting constipation. On 7/17, she had symptmatic hypotension workup showed acute thrombi in bilateral iliac and femoral veins, requiring initiation of heparin gtt. Subsequently had a drop in Hgb requiring 1U, vascular surgery deemed an IVC filter to be of little additional value. Critical care consulted 7/18 after recurrent hypotension to 60s/40s, responsive to 500 cc bolus, Vanc/zosyn was started and workup was started for infection. Did ok overnight, but had more nausea, vomiting and chest pressure this morning in the setting of hypotension, requiring emergent intubation. EKG suspicious for acute MI in inferior leads, she was evaluated by interventional who suspected demand ischemia, inserted an IABP at bedside with subsequent normalization of EKG changes. Workup showed no acute abdomen, PE were present without associated RV strain, and imaging showed progression of intra-abdominal and intra-thoracic fluid collections consistent with progressive heart failure in the setting of insufficient diuresis due to decreasing cardiac function. Her decompensation appears to have been the result of progressive fluid overload that culminated in cardiogenic shock with assocaited ischemia, transiently improved by intra-aortic balloon pump. However, given her advanced disease and lack of advanced options, this modality proved to be a temporary measure and within 24 hours following admission, pressor support had to be  escalated to four medications as her clinical status continued to decline. Goals of care were discussed and the decision for DNR was made, with continued medical therapy to try to optimize hemodynamic status until family could arrive and/or the patient improved.     Notified by day team that patient is DNR and family is awaiting daughter to arrive to withdraw care. Called by nurse and informed that daughter and family were here. I went to bedside and discuss that patient is currently in maximal support. She has multi organ failure as evidenced by IABP/pressors, ventilator, dialysis. She continues to deteriorate despite all this measures. Family understood and no questions were answered as they were already aware of this information from day team. I confirmed DNR. I was later notified that patient's family was ready for withdrawal of care. Orders for morphine and ativan were ordered. Drips, IABP were stopped. Ventilator was initially wet to RR of 8 and eventually turned off. After a few minutes, she went into asystole.     No heart sounds auscultated. Pupils fixed and dilated. No response to painful stimuli. No spontaneous breaths. She was pronounced death at 2147.

## 2019-07-20 NOTE — PROGRESS NOTES
DIALYSIS NOTE  Patient evaluated while undergoing Slow Low Efficiency Dialysis (SLED) indicated for ESRD and hemodynamic instability. No complications, tolerating session with current UFR. Will continue current support.

## 2019-07-20 NOTE — ASSESSMENT & PLAN NOTE
48F with ESRD, multivessel CAD (s/p NSTEMI 4/2019, 99% mid LAD, 100% distal RCA, 80% prox Lcx), extensive acute iliofemoral DVT,  DM2, HTN, HLD, obesity hospitalized with tibial fx s/p ex-fix on 7/6, transferred to ICU this AM with shock and inferior STEMI. Known extensive CAD, thallium scan from April with fixed 25% apical defect indicative of nonviable myocardium. ST elevations resolved with supportive care suggestive of demand ischemia due to shock, echo suggestive of hypovolemia. Will treat empirically for infection with broad spectrum coverage, follow infectious workup and workup intra-abdominal pathology    -admit to CCU servica  -CT chest/abdomen/pelvis to rule out PE and determine etiology of abdominal distention and signs of ileus/SBO  -continue 1:1 IABP  -Stabilize with vasopressors  -NSB PRN for target CVP 5-10  -trend CVP and SVO2  -strict ins and outs  -volume management as per nephrology, guided by hemodynamics  -continue vanc/zosyn  -follow blood cultures, urine cultures  -very poor prognosis

## 2019-07-20 NOTE — ASSESSMENT & PLAN NOTE
ESRD on dialysis, complicated by pro-thrombotic state    - continue dialysis as per nephrology input to assist in toxin clearance  - volume management as above

## 2019-07-20 NOTE — PROGRESS NOTES
Dr. Lewis at bedside. Updated on patient's status and that the patient is currently on 0.36 mcg/kg/min of epi, 0.6 mcg/kg/min of levo, 0.08 units/min of vaso, and 5mcg/kg/min of dobutamine to maintain and augmented pressure of >70. Will continue to monitor.

## 2019-07-20 NOTE — PROGRESS NOTES
Upon arrival to shift patient on 0.12 of levo. Vent 30% FiO2 and 5 of PEEP. CVP 21. Prior to CT patient up to 0.18mcg/kg/min of levo. Lactic 10.8. Plans for CRRT after CT. After CT patient SaO2 dropped to 86%. FiO2 increased to 40% and ABG done, Pao2 69% and SaO2 91 on ABG. FiO2 increased to 50%. SaO2 came up to mid 90s. CRRT started, levo increased to 0.28. Patient clotted off after 37 min. Per nephrology line cathflowed for 2 hours. Pressor requirements increased throughout the shift. Vaso and dobutamine were started, then epi started. Drips titrated for IABP augmented of 70. 3 amp of bicarb given total. CRRT restarted. This morning SVO2 down to 36 from 68. FiO2 up to 60%. EPI at 0.3, vaso 0.08, levo 0.5. Lactic 12.69-12.23 this morning. Attempted to slowly transition patient from propfol to precedex but patient was waking up and becoming aggitated on 1.4 of precedex and pressure decreases when awake, patient changed back to propofol, fentanyl added. Unable to doppler posterior tibial pulses throughout the shift, dorsalis pedis pulses remain dopplerable. Dr. Rushing kept updated on patient's status throughout the night, including labs, ABGs, and pressors requirements. Dr. Rushing at bedside multiple times throughout the night. Patient's sister at bedside, updated on patient's status per MD.  at bedside to talk to family.

## 2019-07-20 NOTE — SUBJECTIVE & OBJECTIVE
dPrincipal Problem:Shock    Principal Orthopedic Problem: same    Interval History: Patient seen and examined at bedside.  STEMI overnight.  Intubated and transferred to ICU; placed on vent.  IABP, central line placed. Patient decompensating, requiring increasing pressors and respiratory support. Dialysis today.     Review of patient's allergies indicates:   Allergen Reactions    Flagyl [metronidazole hcl] Nausea And Vomiting    Clindamycin Diarrhea       Current Facility-Administered Medications   Medication    0.9%  NaCl infusion (CRRT USE ONLY)    0.9%  NaCl infusion (for blood administration)    0.9%  NaCl infusion (for blood administration)    0.9%  NaCl infusion    acetaminophen tablet 650 mg    aspirin suppository 300 mg    atorvastatin tablet 40 mg    chlorhexidine 0.12 % solution 15 mL    dexmedetomidine (PRECEDEX) 400mcg/100mL 0.9% NaCL infusion    dextrose 10% (D10W) Bolus    dextrose 10% (D10W) Bolus    epoetin adonay-epbx injection 5,800 Units    famotidine (PF) injection 20 mg    famotidine tablet 20 mg    glucagon (human recombinant) injection 1 mg    glucose chewable tablet 16 g    glucose chewable tablet 24 g    heparin (porcine) injection 1,000 Units    heparin 25,000 units in dextrose 5% (100 units/ml) IV bolus from bag - ADDITIONAL PRN BOLUS - 30 units/kg    heparin 25,000 units in dextrose 5% (100 units/ml) IV bolus from bag - ADDITIONAL PRN BOLUS - 60 units/kg    heparin 25,000 units in dextrose 5% 250 mL (100 units/mL) infusion HIGH INTENSITY nomogram - [facility]    insulin aspart U-100 pen 0-5 Units    magnesium sulfate 2g in water 50mL IVPB (premix)    midodrine tablet 10 mg    midodrine tablet 20 mg    norepinephrine 4 mg in dextrose 5% 250 mL infusion (premix) (titrating)    ondansetron injection 4 mg    oxyCODONE immediate release tablet 5 mg    pantoprazole injection 40 mg    piperacillin-tazobactam 4.5 g in sodium chloride 0.9% 100 mL IVPB (ready to mix system)  "   promethazine (PHENERGAN) 6.25 mg in dextrose 5 % 50 mL IVPB    propofol (DIPRIVAN) 10 mg/mL infusion    sodium chloride 0.9% flush 10 mL    sodium phosphate 20.01 mmol in dextrose 5 % 250 mL IVPB    sodium phosphate 30 mmol in dextrose 5 % 250 mL IVPB    sodium phosphate 39.99 mmol in dextrose 5 % 250 mL IVPB     Objective:     Temp:  [97.8 °F (36.6 °C)]   Pulse:  []   Resp:  [15-32]   BP: ()/(44-68)   SpO2:  [91 %-100 %]  Temp:  [97.8 °F (36.6 °C)-98.9 °F (37.2 °C)] 97.8 °F (36.6 °C)  Pulse:  [] 129  Resp:  [15-37] 28  SpO2:  [90 %-100 %] 94 %  BP: ()/(33-88) 78/55     Weight: 90.3 kg (199 lb)  Height: 5' 8" (172.7 cm)  Body mass index is 30.26 kg/m².        Ortho/SPM Exam     Intubated  Sedated     MSK:    Stage one right ischial ulcer without drainage or SOI    RLE:  NV exam unable to be performed 2/2 sedation  Compartments soft  Aquacel with 1x1cm area of drainage, no change  Proximal pin site gauze saturated  DP pulse dopplerable.   No PT pulse appreciated by doppler.      Significant Labs:     Recent Labs   Lab 07/20/19  0746   WBC 20.60*   RBC 3.02*   HGB 9.3*   HCT 30.5*      *   MCH 30.8   MCHC 30.5*     Recent Labs   Lab 07/20/19  0318   *  131*   K 4.2  4.2   CL 90*  90*   CO2 14*  14*   BUN 37*  37*   CREATININE 4.7*  4.7*   MG 2.3  2.3     Recent Labs     07/20/19  0607   PH 7.263*   PCO2 33.4*   PO2 81   HCO3 15.1*   POCSATURATED 94*   BE -12       Significant Imaging: I have reviewed all pertinent imaging results/findings.  "

## 2019-07-20 NOTE — H&P
[facility]  Cardiology  History and Physical     Patient Name: Jenni Todd  MRN: [id number]  Admission Date: 7/6/2019  Code Status: Full Code   Attending Provider: Vadim Ngo MD   Primary Care Physician: Michael Tan Iii, MD  Principal Problem:Shock    Patient information was obtained from past medical records.     Subjective:     Chief Complaint:  Shock     HPI:  Reason for admission shock with associated inferior STEMI.    This is a 48 woman with hx of ESRD on HD (R femoral trialysis), numerous DVT with associated occlusion of the RIJ and LIJ, DMII, HTN, HLD,GIB, 3V CAD that is not able to be revascularized, and obesity. She initially presented 07/06 after a fall, found to have R tibial fracture with Ex-fix on 7/6 and ORIF on 07/11. Post-op she had several issues that include non-compliance with DVT prophylaxis, thrombosis of her catheter (and prior thrombosis of her fistulas), hypotension after HD requiring midodrine, and persisting constipation. On 7/17, she had symptmatic hypotension workup showed acute thrombi in bilateral iliac and femoral veins, requiring initiation of heparin gtt. Subsequently had a drop in Hgb requiring 1U, vascular surgery deemed an IVC filter to be of little additional value. Critical care consulted 7/18 after recurrent hypotension to 60s/40s, responsive to 500 cc bolus, Vanc/zosyn was started and workup was started for infection. Did ok overnight, but had more nausea, vomiting and chest pressure this morning in the setting of hypotension, requiring emergent intubation. EKG suspicious for acute MI in inferior leads, she was evaluated by interventional who suspected demand ischemia, inserted an IABP at bedside with subsequent normalization of EKG changes.    On exam abdomen was tense and without bowel sounds, echo performed at bedside showed no RV strain and persistently reduced LVEF. Patient required additional central access to allow pressor  administration during CRRT, access only able to be obtained through left axillary vein with the assistance of critical care surgery team.    Past Medical History:   Diagnosis Date    Abnormal finding on Pap smear, HPV DNA positive     Anemia associated with chronic renal failure     on Epogen    Blood type B+     Bulging discs - symptomatic      CAD (coronary artery disease)     Cardiomyopathy 3/13/2019    Diabetes mellitus, type 2     ESRD (end stage renal disease) 2004    FSGS (focal segmental glomerulosclerosis)     with collapsing    Hyperlipidemia     Hypertension     Neuropathy     NSTEMI (non-ST elevated myocardial infarction) 3/29/2019    Obesity     Secondary hyperparathyroidism, renal     TIA (transient ischemic attack)     Uterine fibroid     small uterine        Past Surgical History:   Procedure Laterality Date    ANGIOGRAM, CORONARY ARTERY N/A 4/15/2019    Performed by Roland Napier MD at [facility] CATH LAB    APPLICATION, EXTERNAL FIXATION DEVICE, LARGE, TIBIA- SYNTHES Right 2019    Performed by Corwin Lopez MD at [facility] OR 2ND FLR    CARDIAC CATHETERIZATION      PCI x 2     SECTION, CLASSIC      COLONOSCOPY N/A 2018    Performed by Rodrigue Russell MD at [facility] ENDO (2ND FLR)    DEBRIDEMENT-WOUND Left 2016    Performed by Teressa Maddox MD at [facility] OR    DIALYSIS FISTULA CREATION      multiple fistulas and grafts before PD     EGD (ESOPHAGOGASTRODUODENOSCOPY) N/A 3/14/2019    Performed by Adolph Davila MD at [facility] ENDO    EGD (ESOPHAGOGASTRODUODENOSCOPY) N/A 3/13/2019    Performed by Adolph Davila MD at [facility] ENDO    INCISION AND DRAINAGE (I&D), LABIAL N/A 2016    Performed by Harmony Fernandez MD at [facility] OR    INCISION AND DRAINAGE (I&D), LABIAL (ADD ON) Left 2016    Performed by Teressa Maddox MD at [facility] OR    INCISION AND DRAINAGE OF WOUND Left     VULVAR ABCESS WITH NECROSIS    Insertion, Catheter,  Central Venous, Hemodialysis N/A 3/28/2019    Performed by Kimo Weeks MD at [facility] CATH LAB    Insertion, Catheter, Central Venous, Hemodialysis N/A 3/18/2019    Performed by Kimo Weeks MD at [facility] CATH LAB    INSERTION, CATHETER, TUNNELED ABORTED Left 3/14/2019    Performed by Servando Solis MD at [facility] OR    INSERTION, GRAFT, ARTERIOVENOUS, RIGHT ARM Right 3/22/2019    Performed by BESSIE Wynn III, MD at [facility] OR 2ND FLR    INSERTION, INTRA-AORTIC BALLOON PUMP  4/15/2019    Performed by Roland Napier MD at [facility] CATH LAB    Left heart cath Left 4/15/2019    Performed by Roland Napier MD at [facility] CATH LAB    ORIF, FRACTURE, TIBIA, PLATEAU Right 7/11/2019    Performed by Dominick Desai MD at [facility] OR 2ND FLR    ORIF, HIP Left 9/13/2018    Performed by Tevin Grullon MD at [facility] [place] OR    PERITONEAL CATHETER INSERTION      PERMCATH REWIRE- TUNNELED CATH REWIRE Left 11/13/2017    Performed by Baldev Munroe MD at [facility] [place] CATH LAB    PERMCATH REWIRE- TUNNELED CATH REWIRE N/A 10/5/2017    Performed by Baldev Munroe MD at [facility] [place] CATH LAB    REMOVAL, CATHETER, DIALYSIS, PERITONEAL N/A 3/14/2019    Performed by Servando Solis MD at [facility] OR    REMOVAL, EXTERNAL FIXATION DEVICE Right 7/11/2019    Performed by Dominick Desai MD at [facility] OR 2ND FLR    REPLACEMENT, CATHETER, DIALYSIS, OVER GUIDEWIRE, USING EXISTING VENOUS ACCESS N/A 6/27/2019    Performed by Kimo Weeks MD at [facility] CATH LAB    REPLACEMENT, CATHETER, DIALYSIS, OVER GUIDEWIRE, USING EXISTING VENOUS ACCESS Left 5/24/2019    Performed by Baldev Munroe MD at [facility] [place] CATH LAB    UMBILICAL HERNIA REPAIR      Venogram, possible intervention Right 3/20/2019    Performed by BESSIE Wynn III, MD at [facility] CATH LAB       Review of patient's allergies indicates:   Allergen Reactions    Flagyl [metronidazole hcl] Nausea And Vomiting    Clindamycin Diarrhea       No current facility-administered medications on file  "prior to encounter.      Current Outpatient Medications on File Prior to Encounter   Medication Sig    aspirin (ECOTRIN) 81 MG EC tablet Take 1 tablet (81 mg total) by mouth once daily.    atorvastatin (LIPITOR) 40 MG tablet Take 40 mg by mouth every evening.     clopidogrel (PLAVIX) 75 mg tablet Take 1 tablet (75 mg total) by mouth once daily.    ergocalciferol (ERGOCALCIFEROL) 50,000 unit Cap Take 50,000 Units by mouth every 7 days. Sunday    famotidine (PEPCID) 20 MG tablet Take 1 tablet (20 mg total) by mouth nightly as needed for Heartburn.    ferrous sulfate 325 (65 FE) MG EC tablet Take 1 tablet (325 mg total) by mouth 2 (two) times daily. (Patient taking differently: Take 325 mg by mouth once daily. )    gabapentin (NEURONTIN) 300 MG capsule Take 1 capsule (300 mg total) by mouth 2 (two) times daily.    levoFLOXacin (LEVAQUIN) 250 MG tablet Take 1 tablet (250 mg total) by mouth once daily.    magnesium oxide (MAG-OX) 400 mg (241.3 mg magnesium) tablet Take 1 tablet (400 mg total) by mouth 3 (three) times daily.    midodrine (PROAMATINE) 10 MG tablet One to two tab po q 8 hours. May take dose during dialysis if bp drops below SBP 90. (Patient taking differently: 20 mg 3 (three) times daily. May take dose during dialysis if bp drops below SBP 90.)    mupirocin (BACTROBAN) 2 % ointment Apply topically 2 (two) times daily.    ondansetron (ZOFRAN-ODT) 4 MG TbDL Take 2 tablets (8 mg total) by mouth every 8 (eight) hours as needed.    ONETOUCH VERIO Strp Use 1 strip 3 times daily to test Blood sugar    pen needle, diabetic 31 gauge x 3/16" Ndle 1 each by Misc.(Non-Drug; Combo Route) route 4 (four) times daily before meals and nightly.    sevelamer carbonate (RENVELA) 800 mg Tab Take 1 tablet (800 mg total) by mouth 3 (three) times daily with meals.    vitamin renal formula, B-complex-vitamin c-folic acid, (B COMPLEX-C-FOLIC ACID) 1 mg Cap Take 1 capsule by mouth once daily. 1 Capsule Oral Every day "    insulin aspart U-100 (NOVOLOG) 100 unit/mL (3 mL) InPn pen Inject 0-5 Units into the skin before meals and at bedtime as needed (Hyperglycemia).     Family History     Problem Relation (Age of Onset)    Cancer Maternal Grandmother, Paternal Grandfather    Diabetes Maternal Aunt, Paternal Aunt        Tobacco Use    Smoking status: Never Smoker    Smokeless tobacco: Never Used   Substance and Sexual Activity    Alcohol use: No     Comment: Pt reports some social use of about 1-2 drinks about every six months.    Drug use: No    Sexual activity: Not Currently     Partners: Male     Birth control/protection: None     Review of Systems   Unable to perform ROS: intubated     Objective:     Vital Signs (Most Recent):  Temp: 98.4 °F (36.9 °C) (07/19/19 1500)  Pulse: 74 (07/19/19 1800)  Resp: (!) 23 (07/19/19 1800)  BP: (!) 101/59 (07/19/19 1800)  SpO2: 96 % (07/19/19 1800) Vital Signs (24h Range):  Temp:  [98 °F (36.7 °C)-98.9 °F (37.2 °C)] 98.4 °F (36.9 °C)  Pulse:  [] 74  Resp:  [16-37] 23  SpO2:  [90 %-100 %] 96 %  BP: ()/(33-88) 101/59     Weight: 90.3 kg (199 lb)  Body mass index is 30.26 kg/m².    SpO2: 96 %  O2 Device (Oxygen Therapy): ventilator      Intake/Output Summary (Last 24 hours) at 7/19/2019 1906  Last data filed at 7/19/2019 1800  Gross per 24 hour   Intake 1985.37 ml   Output 700 ml   Net 1285.37 ml       Lines/Drains/Airways     Central Venous Catheter Line                 Hemodialysis Catheter right femoral -- days          Drain                 Hemodialysis AV Fistula Left upper arm -- days         Hemodialysis AV Graft Right upper arm -- days         NG/OG Tube 07/19/19 0928 nasogastric Right nostril less than 1 day          Airway                 Airway - Non-Surgical 07/19/19 1030 Endotracheal Tube less than 1 day          Line                 IABP 07/19/19 1110 8.0 Fr. 40 mL less than 1 day          Peripheral Intravenous Line                 Peripheral IV - Single Lumen  07/17/19 1203 20 G;1 3/4 in Right;Anterior Forearm 2 days         Peripheral IV - Single Lumen 07/17/19 1500 20 G Anterior;Right Wrist 2 days                Physical Exam   Constitutional: She appears well-developed and well-nourished. She has a sickly appearance. No distress. She is sedated and intubated.   HENT:   Head: Atraumatic.   Eyes: EOM are normal. No scleral icterus.   Neck: Neck supple. No JVD present.   Cardiovascular: Regular rhythm. Tachycardia present.   Pulmonary/Chest: She is intubated. She has no wheezes. She has no rales.   Left chest central line   Abdominal: Soft. Bowel sounds are normal. She exhibits no distension. There is no tenderness.   Musculoskeletal:   L lateral shin dressing in place, 1-2+ LLE edema.   Skin: Skin is dry. No erythema.       Significant Labs:   ABG:   Recent Labs   Lab 07/19/19  0843 07/19/19  1525   PH 7.413 7.306*   PCO2 39.5 39.1   HCO3 25.2 19.5*   POCSATURATED 100 98   BE 1 -7   , CMP   Recent Labs   Lab 07/19/19  0819 07/19/19  0903 07/19/19  1257    134* 132*   K 3.8 3.9 3.8   CL 92* 91* 92*   CO2 19* 25 22*   * 218* 193*   BUN 38* 39* 39*   CREATININE 5.0* 5.1* 5.0*   CALCIUM 11.6* 11.4* 11.1*   PROT  --  7.2  --    ALBUMIN 2.2* 2.2* 2.0*   BILITOT  --  0.5  --    ALKPHOS  --  127  --    AST  --  76*  --    ALT  --  <5*  --    ANIONGAP 25* 18* 18*   ESTGFRAFRICA 10.9* 10.7* 10.9*   EGFRNONAA 9.5* 9.3* 9.5*   , CBC   Recent Labs   Lab 07/19/19  0903 07/19/19  1134 07/19/19  1524   WBC 16.45* 11.93 11.65   HGB 8.3* 10.1* 11.2*   HCT 27.4* 32.2* 36.3*    210 175    and Troponin   Recent Labs   Lab 07/19/19  0819 07/19/19  0903   TROPONINI 7.239* 7.014*       Significant Imaging:   Multivessel disease found on [facility] in April in setting of NSTEMI during hospitalization with sepsis. 99% subtotal mid LAD lesion on wraparound LAD, patent proximal RCA stent with moderate ISR, 100% distal RCA, 80% proximal Lcx stenosis- reviewed with staff. CTS consulted  and not a candidate for CABG. Plan was for PET to further assess viability, ordered but not performed for possible high risk PCI. Has not had cardiology follow up. TTE with known depressed EF 25%     [facility] 4/2019:  Left Main   There is moderate diffuse disease throughout the vessel.   Left Anterior Descending   Prox LAD lesion is 99% stenosed.   Left Circumflex   Prox Cx lesion is 80% stenosed.   Right Coronary Artery   Mid RCA to Dist RCA lesion is 100% stenosed.     TTE:   · Severely decreased left ventricular systolic function. The estimated ejection fraction is 25%  · Left ventricular diastolic dysfunction.  · Local segmental wall motion abnormalities.  · Low normal right ventricular systolic function.  · Severe left atrial enlargement.  · Mild-to-moderate aortic valve stenosis.  · Aortic valve area is 1.48 cm2; peak velocity is 1.91 m/s; mean gradient is 10.27 mmHg.  · Moderate mitral sclerosis.  · Moderate to moderate-severe (2-3+) mitral regurgitation.  · Mild to moderate tricuspid regurgitation.  · Mild pulmonic regurgitation.  · Normal central venous pressure (3 mm Hg).  · The estimated PA systolic pressure is 32 mm Hg    Assessment and Plan:     * Shock  48F with ESRD, multivessel CAD (s/p NSTEMI 4/2019, 99% mid LAD, 100% distal RCA, 80% prox Lcx), extensive acute iliofemoral DVT,  DM2, HTN, HLD, obesity hospitalized with tibial fx s/p ex-fix on 7/6, transferred to ICU this AM with shock and inferior STEMI. Known extensive CAD, thallium scan from April with fixed 25% apical defect indicative of nonviable myocardium. ST elevations resolved with supportive care suggestive of demand ischemia due to shock, echo suggestive of hypovolemia. Will treat empirically for infection with broad spectrum coverage, follow infectious workup and workup intra-abdominal pathology    -admit to CCU servica  -CT chest/abdomen/pelvis to rule out PE and determine etiology of abdominal distention and signs of ileus/SBO  -continue 1:1  IABP  -Stabilize with vasopressors  -NSB PRN for target CVP 5-10  -trend CVP and SVO2  -strict ins and outs  -volume management as per nephrology, guided by hemodynamics  -continue vanc/zosyn  -follow blood cultures, urine cultures  -very poor prognosis    Acute deep vein thrombosis (DVT) of proximal vein of both lower extremities  Hypercoagulable state with acute thromboses in the legs and chronic occlusions of fistulas and IJ. Low suspicion of PE at this time based on echo findings.    - continue heparin drip    Closed fracture of right tibial plateau  Healing, recovering, will defer additional management or orthopedics    CAD (coronary artery disease)  Appears to be stable, severe CAD exacerbated by shock    - continue heparin drip      Anemia in chronic kidney disease, on chronic dialysis  ESRD on dialysis, complicated by pro-thrombotic state    - continue dialysis as per nephrology input to assist in toxin clearance  - volume management as above    Diabetic neuropathy associated with type 2 diabetes mellitus  - sliding scale insulin        VTE Risk Mitigation (From admission, onward)        Ordered     Place sequential compression device  Until discontinued      07/19/19 1046     IP VTE HIGH RISK PATIENT  Once      07/19/19 1046     heparin 25,000 units in dextrose 5% 250 mL (100 units/mL) infusion HIGH INTENSITY nomogram - [facility]  Continuous      07/17/19 0942     heparin 25,000 units in dextrose 5% (100 units/ml) IV bolus from bag - ADDITIONAL PRN BOLUS - 60 units/kg  As needed (PRN)      07/17/19 0942     heparin 25,000 units in dextrose 5% (100 units/ml) IV bolus from bag - ADDITIONAL PRN BOLUS - 30 units/kg  As needed (PRN)      07/17/19 0942     heparin (porcine) injection 5,000 Units  Every 8 hours      07/09/19 1803     heparin (porcine) injection 5,000 Units  Every 8 hours      07/08/19 1415     heparin (porcine) injection 1,000 Units  As needed (PRN)      07/08/19 1210     Place sequential compression  device  Until discontinued      07/06/19 8783          Héctor Cardenas MD  Cardiology   [facility]

## 2019-07-20 NOTE — PLAN OF CARE
Problem: Adult Inpatient Plan of Care  Goal: Plan of Care Review  Pt code status changed to DNR. Continuing to escalate pressors based off of augemented IABP pressure. Goal remains >70. Currently on levo @ 1.2, epi @ 0.8, vaso @0.08, propofol @ 35, fentanyl @ 50, dobutamine @ 4, heparin @ 5.6 units/kg/hr. CRRT continued with . After discussing with Dr. Ngo, plan to reevaluate transition to comfort care upon daughter's arrival from out of town. All questions answered, family denies any needs at this time. Emotional support provided throughout day. WCTM.

## 2019-07-20 NOTE — SUBJECTIVE & OBJECTIVE
Past Medical History:   Diagnosis Date    Abnormal finding on Pap smear, HPV DNA positive     Anemia associated with chronic renal failure     on Epogen    Blood type B+     Bulging discs - symptomatic      CAD (coronary artery disease)     Cardiomyopathy 3/13/2019    Diabetes mellitus, type 2     ESRD (end stage renal disease) 2004    FSGS (focal segmental glomerulosclerosis)     with collapsing    Hyperlipidemia     Hypertension     Neuropathy     NSTEMI (non-ST elevated myocardial infarction) 3/29/2019    Obesity     Secondary hyperparathyroidism, renal     TIA (transient ischemic attack)     Uterine fibroid     small uterine        Past Surgical History:   Procedure Laterality Date    ANGIOGRAM, CORONARY ARTERY N/A 4/15/2019    Performed by Roland Napier MD at [facility] CATH LAB    APPLICATION, EXTERNAL FIXATION DEVICE, LARGE, TIBIA- SYNTHES Right 2019    Performed by Corwin Lopez MD at [facility] OR 2ND FLR    CARDIAC CATHETERIZATION      PCI x 2     SECTION, CLASSIC      COLONOSCOPY N/A 2018    Performed by Rodrigue Russell MD at [facility] ENDO (2ND FLR)    DEBRIDEMENT-WOUND Left 2016    Performed by Teressa Maddox MD at [facility] OR    DIALYSIS FISTULA CREATION      multiple fistulas and grafts before PD     EGD (ESOPHAGOGASTRODUODENOSCOPY) N/A 3/14/2019    Performed by Adolph Davila MD at [facility] ENDO    EGD (ESOPHAGOGASTRODUODENOSCOPY) N/A 3/13/2019    Performed by Adolph Davila MD at [facility] ENDO    INCISION AND DRAINAGE (I&D), LABIAL N/A 2016    Performed by Harmony Fernandez MD at [facility] OR    INCISION AND DRAINAGE (I&D), LABIAL (ADD ON) Left 2016    Performed by Teressa Maddox MD at [facility] OR    INCISION AND DRAINAGE OF WOUND Left     VULVAR ABCESS WITH NECROSIS    Insertion, Catheter, Central Venous, Hemodialysis N/A 3/28/2019    Performed by Kimo Weeks MD at [facility] CATH LAB    Insertion, Catheter, Central Venous,  Hemodialysis N/A 3/18/2019    Performed by Kimo Weeks MD at [facility] CATH LAB    INSERTION, CATHETER, TUNNELED ABORTED Left 3/14/2019    Performed by Servando Solis MD at [facility] OR    INSERTION, GRAFT, ARTERIOVENOUS, RIGHT ARM Right 3/22/2019    Performed by BESSIE Wynn III, MD at [facility] OR 2ND FLR    INSERTION, INTRA-AORTIC BALLOON PUMP  4/15/2019    Performed by Roland Napier MD at [facility] CATH LAB    Left heart cath Left 4/15/2019    Performed by Roland Napier MD at [facility] CATH LAB    ORIF, FRACTURE, TIBIA, PLATEAU Right 7/11/2019    Performed by Dominick Desai MD at [facility] OR 2ND FLR    ORIF, HIP Left 9/13/2018    Performed by Tevin Grullon MD at [facility] OR    PERITONEAL CATHETER INSERTION      PERMCATH REWIRE- TUNNELED CATH REWIRE Left 11/13/2017    Performed by Baldev Munroe MD at [facility] CATH LAB    PERMCATH REWIRE- TUNNELED CATH REWIRE N/A 10/5/2017    Performed by Baldev Munroe MD at [facility] CATH LAB    REMOVAL, CATHETER, DIALYSIS, PERITONEAL N/A 3/14/2019    Performed by Servando Solis MD at [facility] OR    REMOVAL, EXTERNAL FIXATION DEVICE Right 7/11/2019    Performed by Dominick Desai MD at [facility] OR 2ND FLR    REPLACEMENT, CATHETER, DIALYSIS, OVER GUIDEWIRE, USING EXISTING VENOUS ACCESS N/A 6/27/2019    Performed by Kimo Weeks MD at [facility] CATH LAB    REPLACEMENT, CATHETER, DIALYSIS, OVER GUIDEWIRE, USING EXISTING VENOUS ACCESS Left 5/24/2019    Performed by Baldev Munroe MD at [facility] CATH LAB    UMBILICAL HERNIA REPAIR      Venogram, possible intervention Right 3/20/2019    Performed by BESSIE Wynn III, MD at [facility] CATH LAB       Review of patient's allergies indicates:   Allergen Reactions    Flagyl [metronidazole hcl] Nausea And Vomiting    Clindamycin Diarrhea       No current facility-administered medications on file prior to encounter.      Current Outpatient Medications on File Prior to Encounter   Medication Sig    aspirin (ECOTRIN) 81 MG EC tablet  "Take 1 tablet (81 mg total) by mouth once daily.    atorvastatin (LIPITOR) 40 MG tablet Take 40 mg by mouth every evening.     clopidogrel (PLAVIX) 75 mg tablet Take 1 tablet (75 mg total) by mouth once daily.    ergocalciferol (ERGOCALCIFEROL) 50,000 unit Cap Take 50,000 Units by mouth every 7 days. Sunday    famotidine (PEPCID) 20 MG tablet Take 1 tablet (20 mg total) by mouth nightly as needed for Heartburn.    ferrous sulfate 325 (65 FE) MG EC tablet Take 1 tablet (325 mg total) by mouth 2 (two) times daily. (Patient taking differently: Take 325 mg by mouth once daily. )    gabapentin (NEURONTIN) 300 MG capsule Take 1 capsule (300 mg total) by mouth 2 (two) times daily.    levoFLOXacin (LEVAQUIN) 250 MG tablet Take 1 tablet (250 mg total) by mouth once daily.    magnesium oxide (MAG-OX) 400 mg (241.3 mg magnesium) tablet Take 1 tablet (400 mg total) by mouth 3 (three) times daily.    midodrine (PROAMATINE) 10 MG tablet One to two tab po q 8 hours. May take dose during dialysis if bp drops below SBP 90. (Patient taking differently: 20 mg 3 (three) times daily. May take dose during dialysis if bp drops below SBP 90.)    mupirocin (BACTROBAN) 2 % ointment Apply topically 2 (two) times daily.    ondansetron (ZOFRAN-ODT) 4 MG TbDL Take 2 tablets (8 mg total) by mouth every 8 (eight) hours as needed.    ONETOUCH VERIO Strp Use 1 strip 3 times daily to test Blood sugar    pen needle, diabetic 31 gauge x 3/16" Ndle 1 each by Misc.(Non-Drug; Combo Route) route 4 (four) times daily before meals and nightly.    sevelamer carbonate (RENVELA) 800 mg Tab Take 1 tablet (800 mg total) by mouth 3 (three) times daily with meals.    vitamin renal formula, B-complex-vitamin c-folic acid, (B COMPLEX-C-FOLIC ACID) 1 mg Cap Take 1 capsule by mouth once daily. 1 Capsule Oral Every day    insulin aspart U-100 (NOVOLOG) 100 unit/mL (3 mL) InPn pen Inject 0-5 Units into the skin before meals and at bedtime as needed " (Hyperglycemia).     Family History     Problem Relation (Age of Onset)    Cancer Maternal Grandmother, Paternal Grandfather    Diabetes Maternal Aunt, Paternal Aunt        Tobacco Use    Smoking status: Never Smoker    Smokeless tobacco: Never Used   Substance and Sexual Activity    Alcohol use: No     Comment: Pt reports some social use of about 1-2 drinks about every six months.    Drug use: No    Sexual activity: Not Currently     Partners: Male     Birth control/protection: None     Review of Systems   Unable to perform ROS: intubated     Objective:     Vital Signs (Most Recent):  Temp: 98.4 °F (36.9 °C) (07/19/19 1500)  Pulse: 74 (07/19/19 1800)  Resp: (!) 23 (07/19/19 1800)  BP: (!) 101/59 (07/19/19 1800)  SpO2: 96 % (07/19/19 1800) Vital Signs (24h Range):  Temp:  [98 °F (36.7 °C)-98.9 °F (37.2 °C)] 98.4 °F (36.9 °C)  Pulse:  [] 74  Resp:  [16-37] 23  SpO2:  [90 %-100 %] 96 %  BP: ()/(33-88) 101/59     Weight: 90.3 kg (199 lb)  Body mass index is 30.26 kg/m².    SpO2: 96 %  O2 Device (Oxygen Therapy): ventilator      Intake/Output Summary (Last 24 hours) at 7/19/2019 1906  Last data filed at 7/19/2019 1800  Gross per 24 hour   Intake 1985.37 ml   Output 700 ml   Net 1285.37 ml       Lines/Drains/Airways     Central Venous Catheter Line                 Hemodialysis Catheter right femoral -- days          Drain                 Hemodialysis AV Fistula Left upper arm -- days         Hemodialysis AV Graft Right upper arm -- days         NG/OG Tube 07/19/19 0928 nasogastric Right nostril less than 1 day          Airway                 Airway - Non-Surgical 07/19/19 1030 Endotracheal Tube less than 1 day          Line                 IABP 07/19/19 1110 8.0 Fr. 40 mL less than 1 day          Peripheral Intravenous Line                 Peripheral IV - Single Lumen 07/17/19 1203 20 G;1 3/4 in Right;Anterior Forearm 2 days         Peripheral IV - Single Lumen 07/17/19 1500 20 G Anterior;Right Wrist 2  days                Physical Exam   Constitutional: She appears well-developed and well-nourished. She has a sickly appearance. No distress. She is sedated and intubated.   HENT:   Head: Atraumatic.   Eyes: EOM are normal. No scleral icterus.   Neck: Neck supple. No JVD present.   Cardiovascular: Regular rhythm. Tachycardia present.   Pulmonary/Chest: She is intubated. She has no wheezes. She has no rales.   Left chest central line   Abdominal: Soft. Bowel sounds are normal. She exhibits no distension. There is no tenderness.   Musculoskeletal:   L lateral shin dressing in place, 1-2+ LLE edema.   Skin: Skin is dry. No erythema.       Significant Labs:   ABG:   Recent Labs   Lab 07/19/19  0843 07/19/19  1525   PH 7.413 7.306*   PCO2 39.5 39.1   HCO3 25.2 19.5*   POCSATURATED 100 98   BE 1 -7   , CMP   Recent Labs   Lab 07/19/19  0819 07/19/19  0903 07/19/19  1257    134* 132*   K 3.8 3.9 3.8   CL 92* 91* 92*   CO2 19* 25 22*   * 218* 193*   BUN 38* 39* 39*   CREATININE 5.0* 5.1* 5.0*   CALCIUM 11.6* 11.4* 11.1*   PROT  --  7.2  --    ALBUMIN 2.2* 2.2* 2.0*   BILITOT  --  0.5  --    ALKPHOS  --  127  --    AST  --  76*  --    ALT  --  <5*  --    ANIONGAP 25* 18* 18*   ESTGFRAFRICA 10.9* 10.7* 10.9*   EGFRNONAA 9.5* 9.3* 9.5*   , CBC   Recent Labs   Lab 07/19/19  0903 07/19/19  1134 07/19/19  1524   WBC 16.45* 11.93 11.65   HGB 8.3* 10.1* 11.2*   HCT 27.4* 32.2* 36.3*    210 175    and Troponin   Recent Labs   Lab 07/19/19  0819 07/19/19  0903   TROPONINI 7.239* 7.014*       Significant Imaging:   Multivessel disease found on [facility] in April in setting of NSTEMI during hospitalization with sepsis. 99% subtotal mid LAD lesion on wraparound LAD, patent proximal RCA stent with moderate ISR, 100% distal RCA, 80% proximal Lcx stenosis- reviewed with staff. CTS consulted and not a candidate for CABG. Plan was for PET to further assess viability, ordered but not performed for possible high risk PCI. Has not  had cardiology follow up. TTE with known depressed EF 25%     [facility] 4/2019:  Left Main   There is moderate diffuse disease throughout the vessel.   Left Anterior Descending   Prox LAD lesion is 99% stenosed.   Left Circumflex   Prox Cx lesion is 80% stenosed.   Right Coronary Artery   Mid RCA to Dist RCA lesion is 100% stenosed.     TTE:   · Severely decreased left ventricular systolic function. The estimated ejection fraction is 25%  · Left ventricular diastolic dysfunction.  · Local segmental wall motion abnormalities.  · Low normal right ventricular systolic function.  · Severe left atrial enlargement.  · Mild-to-moderate aortic valve stenosis.  · Aortic valve area is 1.48 cm2; peak velocity is 1.91 m/s; mean gradient is 10.27 mmHg.  · Moderate mitral sclerosis.  · Moderate to moderate-severe (2-3+) mitral regurgitation.  · Mild to moderate tricuspid regurgitation.  · Mild pulmonic regurgitation.  · Normal central venous pressure (3 mm Hg).  · The estimated PA systolic pressure is 32 mm Hg

## 2019-07-20 NOTE — PROGRESS NOTES
Pharmacokinetic Assessment Follow Up: IV Vancomycin    Vancomycin serum concentration assessment(s):    The random level was drawn correctly and can be used to guide therapy at this time. Vancomycin level resulted at 16.7 mcg/mL.     Vancomycin Regimen Plan:    · Initiate Vancomycin 1250 mg one time dose  · Re-dose when the random level is less than 20 mcg/mL, next level to be drawn tomorrow morning with am labs.  · Pharmacy will continue to follow and monitor vancomycin.    Please contact pharmacy at extension [phone] for questions regarding this assessment.    Thank you for the consult,   Kam Hernandez     Patient brief summary:  Jenni Todd is a 48 y.o. female initiated on antimicrobial therapy with IV Vancomycin for treatment of suspected sepsis    Drug Allergies:   Review of patient's allergies indicates:   Allergen Reactions    Flagyl [metronidazole hcl] Nausea And Vomiting    Clindamycin Diarrhea       Actual Body Weight:   100 kg    Renal Function:   Estimated Creatinine Clearance: 17.9 mL/min (A) (based on SCr of 4.7 mg/dL (H)).,     Dialysis Method (if applicable):  SLED    CBC (last 72 hours):  Recent Labs   Lab Result Units 07/17/19  1144 07/17/19  1836 07/18/19  0509 07/19/19  0819 07/19/19  0903 07/19/19  1134 07/19/19  1524 07/19/19  2021 07/20/19  0020 07/20/19  0318 07/20/19  0746   WBC K/uL 20.91* 19.26* 19.17* 16.64* 16.45* 11.93 11.65 13.63* 16.28* 19.49*  19.49* 20.60*   Hemoglobin g/dL 7.0* 8.6* 9.0* 9.0* 8.3* 10.1* 11.2* 10.4* 10.0* 9.6*  9.6* 9.3*   Hematocrit % 23.3* 28.1* 28.6* 29.8* 27.4* 32.2* 36.3* 33.3* 32.7* 32.3*  32.3* 30.5*   Platelets K/uL 197 191 230 232 235 210 175 215 181 183  183 161   Gran% % 81.9* 80.7* 80.9* 74.8* 78.1* 89.6* 83.3* 79.3* 81.7* 65.0  65.0 85.9*   Lymph% % 9.9* 11.7* 11.0* 17.4* 14.5* 5.4* 8.8* 13.9* 11.5* 15.0*  15.0* 6.8*   Mono% % 6.6 6.1 6.6 6.4 6.1 3.9* 6.4 5.3 5.0 4.0  4.0 4.5   Eosinophil% % 0.1 0.2 0.1 0.2 0.1 0.0 0.2 0.3 0.4 0.0  0.0  0.1   Basophil% % 0.2 0.3 0.2 0.3 0.2 0.3 0.6 0.4 0.4 0.0  0.0 0.6   Differential Method  Automated Automated Automated Automated Automated Automated Automated Automated Automated Automated  Automated Automated       Metabolic Panel (last 72 hours):  Recent Labs   Lab Result Units 07/18/19  0509 07/19/19  0819 07/19/19  0903 07/19/19  1257 07/19/19  2128 07/20/19  0318   Sodium mmol/L 135* 136 134* 132* 132* 131*  131*   Potassium mmol/L 3.7 3.8 3.9 3.8 4.3 4.2  4.2   Chloride mmol/L 91* 92* 91* 92* 92* 90*  90*   CO2 mmol/L 19* 19* 25 22* 13* 14*  14*   Glucose mg/dL 174* 192* 218* 193* 121* 96  96   BUN, Bld mg/dL 31* 38* 39* 39* 40* 37*  37*   Creatinine mg/dL 4.5* 5.0* 5.1* 5.0* 5.1* 4.7*  4.7*   Albumin g/dL 2.2* 2.2* 2.2* 2.0* 1.9* 1.8*  1.8*   Total Bilirubin mg/dL  --   --  0.5  --   --   --    Alkaline Phosphatase U/L  --   --  127  --   --   --    AST U/L  --   --  76*  --   --   --    ALT U/L  --   --  <5*  --   --   --    Magnesium mg/dL 2.2 2.3  --  2.2 2.5 2.3  2.3   Phosphorus mg/dL 3.9 4.8*  --  5.0* 6.4* 6.9*  6.9*       Vancomycin Administrations:  vancomycin given in the last 96 hours                   vancomycin 1750 mg in 0.9% sodium chloride 500 mL IVPB (mg) 1,750 mg New Bag 07/18/19 1359                Drug levels (last 3 results):  Recent Labs   Lab Result Units 07/19/19  0819 07/20/19  0852   Vancomycin, Random ug/mL 25.6 16.7       Microbiologic Results:  Microbiology Results (last 7 days)     Procedure Component Value Units Date/Time    Blood culture [[id number]] Collected:  07/17/19 1000    Order Status:  Completed Specimen:  Blood Updated:  07/20/19 1022     Blood Culture, Routine No Growth to date      No Growth to date      No Growth to date      No Growth to date    Narrative:       Collection has been rescheduled by FB at 07/17/2019 08:54 Reason:   patient in ultrasound notified nurse Keila  Collection has been rescheduled by FB at 07/17/2019 08:54 Reason:   patient in  ultrasound notified nurse Keila    Blood culture [[id number]] Collected:  07/17/19 1000    Order Status:  Completed Specimen:  Blood Updated:  07/20/19 1022     Blood Culture, Routine No Growth to date      No Growth to date      No Growth to date      No Growth to date    Narrative:       Collection has been rescheduled by FB at 07/17/2019 08:54 Reason:   patient in ultrasound notified nurse Keila  Collection has been rescheduled by FB at 07/17/2019 08:54 Reason:   patient in ultrasound notified nurse Keila    Blood culture [[id number]] Collected:  07/18/19 1406    Order Status:  Completed Specimen:  Blood Updated:  07/19/19 1622     Blood Culture, Routine No Growth to date      No Growth to date    Narrative:       From dialysis line if possible AND peripheral stick    Blood culture [[id number]] Collected:  07/18/19 1430    Order Status:  Completed Specimen:  Blood Updated:  07/19/19 1622     Blood Culture, Routine No Growth to date      No Growth to date    Narrative:       Set #2, from dialysis line  Collection has been rescheduled by [facility] at 07/18/2019 14:07 Reason:   nurse Madelin is collecting this lab  Collection has been rescheduled by TRM at 07/18/2019 14:07 Reason:   nurse Madelin is collecting this lab

## 2019-07-20 NOTE — ASSESSMENT & PLAN NOTE
48F with ESRD, multivessel CAD (s/p NSTEMI 4/2019, 99% mid LAD, 100% distal RCA, 80% prox Lcx), extensive acute iliofemoral DVT,  DM2, HTN, HLD, obesity hospitalized with tibial fx s/p ex-fix on 7/6, transferred to ICU this AM with shock and inferior STEMI. Known extensive CAD, thallium scan from April with fixed 25% apical defect indicative of nonviable myocardium. ST elevations resolved with supportive care suggestive of demand ischemia due to shock, hemodynamics suggestive of progressive cardiogenic shock with clinical worsening despite IABP, antibiotics, vasopressor and inotrope support with associated increases in abdominal and pleural effsions.    -continue 1:1 IABP  -increase vasopressors to maintain MAP >65  -trend CVP and SVO2  -strict ins and outs  -volume management as per nephrology, guided by hemodynamics  -continue vanc/zosyn  -very poor prognosis discussed with family, decided on DNR with medication support escalation only  -palliative care consulted.

## 2019-07-20 NOTE — ASSESSMENT & PLAN NOTE
48 y.o. female POD8 s/p R tibial plateau ORIF    Pain control: multimodal  PT/OT: NWB RLE  DVT PPx: therapeutic heparin drip for DVT, FCDs at all times when not ambulating  Abx: vanc, zosyn  Labs: lactate 11, troponin 10, WBC 14, Hb 10.4  Drain: none  Wiggins: none    Dispo: Continue ICU care

## 2019-07-20 NOTE — ASSESSMENT & PLAN NOTE
Progressive pleural effusion L>R since admission likely from volume overload/uremia, now large enough to suggest that it may be hemodynamically significant. Have discussed the option of thoracentesis with family -- that this may slightly prolong survival but is unlikely to change outcome, and the decision is to leave it as is.

## 2019-07-20 NOTE — PROGRESS NOTES
[facility]  Orthopedics  Progress Note    Patient Name: Jenni Todd  MRN: [id number]  Admission Date: 7/6/2019  Hospital Length of Stay: 8 days  Attending Provider: Vadim Ngo MD  Primary Care Provider: Michael Tan Iii, MD  Follow-up For: Procedure(s) (LRB):  ORIF, FRACTURE, TIBIA, PLATEAU (Right)  REMOVAL, EXTERNAL FIXATION DEVICE (Right)    Post-Operative Day: 8 Days Post-Op  Subjective:     Principal Problem:Shock    Principal Orthopedic Problem: same    Interval History: Patient seen and examined at bedside.  STEMI overnight.  Intubated and transferred to ICU.  IABP placed.    Review of patient's allergies indicates:   Allergen Reactions    Flagyl [metronidazole hcl] Nausea And Vomiting    Clindamycin Diarrhea       Current Facility-Administered Medications   Medication    0.9%  NaCl infusion (CRRT USE ONLY)    0.9%  NaCl infusion (for blood administration)    0.9%  NaCl infusion (for blood administration)    0.9%  NaCl infusion    acetaminophen tablet 650 mg    aspirin suppository 300 mg    atorvastatin tablet 40 mg    chlorhexidine 0.12 % solution 15 mL    dexmedetomidine (PRECEDEX) 400mcg/100mL 0.9% NaCL infusion    dextrose 10% (D10W) Bolus    dextrose 10% (D10W) Bolus    epoetin adonay-epbx injection 5,800 Units    famotidine (PF) injection 20 mg    famotidine tablet 20 mg    glucagon (human recombinant) injection 1 mg    glucose chewable tablet 16 g    glucose chewable tablet 24 g    heparin (porcine) injection 1,000 Units    heparin 25,000 units in dextrose 5% (100 units/ml) IV bolus from bag - ADDITIONAL PRN BOLUS - 30 units/kg    heparin 25,000 units in dextrose 5% (100 units/ml) IV bolus from bag - ADDITIONAL PRN BOLUS - 60 units/kg    heparin 25,000 units in dextrose 5% 250 mL (100 units/mL) infusion HIGH INTENSITY nomogram - [facility]    insulin aspart U-100 pen 0-5 Units    magnesium sulfate 2g in water 50mL IVPB (premix)    midodrine tablet 10 mg  "   midodrine tablet 20 mg    norepinephrine 4 mg in dextrose 5% 250 mL infusion (premix) (titrating)    ondansetron injection 4 mg    oxyCODONE immediate release tablet 5 mg    pantoprazole injection 40 mg    piperacillin-tazobactam 4.5 g in sodium chloride 0.9% 100 mL IVPB (ready to mix system)    promethazine (PHENERGAN) 6.25 mg in dextrose 5 % 50 mL IVPB    propofol (DIPRIVAN) 10 mg/mL infusion    sodium chloride 0.9% flush 10 mL    sodium phosphate 20.01 mmol in dextrose 5 % 250 mL IVPB    sodium phosphate 30 mmol in dextrose 5 % 250 mL IVPB    sodium phosphate 39.99 mmol in dextrose 5 % 250 mL IVPB     Objective:     Vital Signs (Most Recent):  Temp: 98.5 °F (36.9 °C) (07/19/19 1901)  Pulse: 75 (07/19/19 2102)  Resp: (!) 27 (07/19/19 2102)  BP: (!) 83/54 (07/19/19 2030)  SpO2: 100 % (07/19/19 2102) Vital Signs (24h Range):  Temp:  [98.3 °F (36.8 °C)-98.9 °F (37.2 °C)] 98.5 °F (36.9 °C)  Pulse:  [] 75  Resp:  [16-37] 27  SpO2:  [90 %-100 %] 100 %  BP: ()/(33-88) 83/54     Weight: 90.3 kg (199 lb)  Height: 5' 8" (172.7 cm)  Body mass index is 30.26 kg/m².        Ortho/SPM Exam     Intubated  Sedated     MSK:    Stage one right ischial ulcer without drainage or SOI    RLE:  NV exam unable to be performed 2/2 sedation  Compartments soft  Aquacel with 1x1cm area of drainage, no change  Proximal pin site gauze saturated    Significant Labs:   CBC:   Recent Labs   Lab 07/19/19  1134 07/19/19  1524 07/19/19 2021   WBC 11.93 11.65 13.63*   HGB 10.1* 11.2* 10.4*   HCT 32.2* 36.3* 33.3*    175 215     CMP:   Recent Labs   Lab 07/19/19  0819 07/19/19  0903 07/19/19  1257    134* 132*   K 3.8 3.9 3.8   CL 92* 91* 92*   CO2 19* 25 22*   * 218* 193*   BUN 38* 39* 39*   CREATININE 5.0* 5.1* 5.0*   CALCIUM 11.6* 11.4* 11.1*   PROT  --  7.2  --    ALBUMIN 2.2* 2.2* 2.0*   BILITOT  --  0.5  --    ALKPHOS  --  127  --    AST  --  76*  --    ALT  --  <5*  --    ANIONGAP 25* 18* 18* "   EGFRNONAA 9.5* 9.3* 9.5*       Significant Imaging: I have reviewed all pertinent imaging results/findings.    Assessment/Plan:     Closed fracture of right tibial plateau  48 y.o. female POD8 s/p R tibial plateau ORIF    Pain control: multimodal  PT/OT: NWB RLE  DVT PPx: therapeutic heparin drip for DVT, FCDs at all times when not ambulating  Abx: vanc, zosyn  Labs: lactate 11, troponin 10, WBC 14, Hb 10.4  Drain: none  Wiggins: none    Dispo: Continue ICU care            Shyam Cohen MD  Orthopedics  [facility]    Attg Note:  Patient seen and examined.  I agree with the resident's assessment and plan.  STEMI.  In ICU.    Dominick Desai MD

## 2019-07-20 NOTE — PLAN OF CARE
Was called that patient is requiring increasing doses of vasopressors. Went to evaluate patient. BP on IABP of 80/44 (MAPs 61-62) with cold extremities, faint DP and absent PT pulses. Radial pulses are faint bilaterally. Labs notable for rising lactate (~10), elevated troponin (10). ScVo2 of 65 with elevated CVP (21). Imaging notable for subsegmental thrombi bilaterally with a small PTX, large right-sided pleural effusion, smaller left-sided pleural effusion.     Given decompensation, called patient's sister and discussed the patient's code status and goals of care. The patient's sister, her POA, will talk to the patient's daughter. Currently patient is full code.      Based on decreased ScVo2 levels, will start dobutamine 5 and add vasopressin for additional pressors. Will continue to monitor.    Yadira Rushing MD  Cardiology - PGY4  Pager [phone]

## 2019-07-20 NOTE — PLAN OF CARE
Patient with increasing requirements of vasopressors: currently on vasopressin 0.08, levophed, epinephrine, dobutamine 5 for goal MAPs of 65. Currently on SLED.     ABG of 7.190/39.9/26. Gave 3 amp of sodium bicarbonate. Repeat ABG of 7.216/29.6/89.     Calculated CO of 3.9, CI 1.8, . Concerning for more septic etiology.     Will decrease precedex and add fentanyl as patient was more agitated. Goal RASS of -1.     Will order 500 cc bolus of IVF.     Discussed prognosis with patient's sister, daughter and cousin. Would like to continue medical management. Patient is full code.     Yadira Rushing MD  Cardiology - PGY4  Pager [phone]

## 2019-07-20 NOTE — PROGRESS NOTES
CRRT NOTES    10hr sled restarted 2hrs post catheter tpa dwell. Both lumens still with sluggish flows, minimal improvement from pre-TPA assessment. Can't tolerate BFR of 200cc/min, maintained it at 150cc/min with -150 arterial pressures.    PNSS pre filter increased to 250cc/hr.  Heparin 6.3 ml/hr hooked pre filter (w/ go signal from Cardiology Dr. Rushing)    Uf rate at minimum, 250cc/hr    See flow sheet    Time off: 1400H

## 2019-07-20 NOTE — PROGRESS NOTES
[facility]  Orthopedics  Progress Note    Patient Name: Jenni Todd  MRN: [id number]  Admission Date: 7/6/2019  Hospital Length of Stay: 9 days  Attending Provider: Vadim Ngo MD  Primary Care Provider: Michael Tan Iii, MD  Follow-up For: Procedure(s) (LRB):  ORIF, FRACTURE, TIBIA, PLATEAU (Right)  REMOVAL, EXTERNAL FIXATION DEVICE (Right)    Post-Operative Day: 9 Days Post-Op  Subjective:     dPrincipal Problem:Shock    Principal Orthopedic Problem: same    Interval History: Patient seen and examined at bedside.  STEMI overnight.  Intubated and transferred to ICU; placed on vent.  IABP, central line placed. Patient decompensating, requiring increasing pressors and respiratory support. Dialysis today.     Review of patient's allergies indicates:   Allergen Reactions    Flagyl [metronidazole hcl] Nausea And Vomiting    Clindamycin Diarrhea       Current Facility-Administered Medications   Medication    0.9%  NaCl infusion (CRRT USE ONLY)    0.9%  NaCl infusion (for blood administration)    0.9%  NaCl infusion (for blood administration)    0.9%  NaCl infusion    acetaminophen tablet 650 mg    aspirin suppository 300 mg    atorvastatin tablet 40 mg    chlorhexidine 0.12 % solution 15 mL    dexmedetomidine (PRECEDEX) 400mcg/100mL 0.9% NaCL infusion    dextrose 10% (D10W) Bolus    dextrose 10% (D10W) Bolus    epoetin adonay-epbx injection 5,800 Units    famotidine (PF) injection 20 mg    famotidine tablet 20 mg    glucagon (human recombinant) injection 1 mg    glucose chewable tablet 16 g    glucose chewable tablet 24 g    heparin (porcine) injection 1,000 Units    heparin 25,000 units in dextrose 5% (100 units/ml) IV bolus from bag - ADDITIONAL PRN BOLUS - 30 units/kg    heparin 25,000 units in dextrose 5% (100 units/ml) IV bolus from bag - ADDITIONAL PRN BOLUS - 60 units/kg    heparin 25,000 units in dextrose 5% 250 mL (100 units/mL) infusion HIGH INTENSITY  "nomogram - [facility]    insulin aspart U-100 pen 0-5 Units    magnesium sulfate 2g in water 50mL IVPB (premix)    midodrine tablet 10 mg    midodrine tablet 20 mg    norepinephrine 4 mg in dextrose 5% 250 mL infusion (premix) (titrating)    ondansetron injection 4 mg    oxyCODONE immediate release tablet 5 mg    pantoprazole injection 40 mg    piperacillin-tazobactam 4.5 g in sodium chloride 0.9% 100 mL IVPB (ready to mix system)    promethazine (PHENERGAN) 6.25 mg in dextrose 5 % 50 mL IVPB    propofol (DIPRIVAN) 10 mg/mL infusion    sodium chloride 0.9% flush 10 mL    sodium phosphate 20.01 mmol in dextrose 5 % 250 mL IVPB    sodium phosphate 30 mmol in dextrose 5 % 250 mL IVPB    sodium phosphate 39.99 mmol in dextrose 5 % 250 mL IVPB     Objective:     Temp:  [97.8 °F (36.6 °C)]   Pulse:  []   Resp:  [15-32]   BP: ()/(44-68)   SpO2:  [91 %-100 %]  Temp:  [97.8 °F (36.6 °C)-98.9 °F (37.2 °C)] 97.8 °F (36.6 °C)  Pulse:  [] 129  Resp:  [15-37] 28  SpO2:  [90 %-100 %] 94 %  BP: ()/(33-88) 78/55     Weight: 90.3 kg (199 lb)  Height: 5' 8" (172.7 cm)  Body mass index is 30.26 kg/m².        Ortho/SPM Exam     Intubated  Sedated     MSK:    Stage one right ischial ulcer without drainage or SOI    RLE:  NV exam unable to be performed 2/2 sedation  Compartments soft  Aquacel with 1x1cm area of drainage, no change  Proximal pin site gauze saturated  DP pulse dopplerable.   No PT pulse appreciated by doppler.      Significant Labs:     Recent Labs   Lab 07/20/19  0746   WBC 20.60*   RBC 3.02*   HGB 9.3*   HCT 30.5*      *   MCH 30.8   MCHC 30.5*     Recent Labs   Lab 07/20/19  0318   *  131*   K 4.2  4.2   CL 90*  90*   CO2 14*  14*   BUN 37*  37*   CREATININE 4.7*  4.7*   MG 2.3  2.3     Recent Labs     07/20/19  0607   PH 7.263*   PCO2 33.4*   PO2 81   HCO3 15.1*   POCSATURATED 94*   BE -12       Significant Imaging: I have reviewed all pertinent imaging " results/findings.    Assessment/Plan:     Closed fracture of right tibial plateau  48 y.o. female POD8 s/p R tibial plateau ORIF    Pain control: multimodal  PT/OT: NWB RLE  DVT PPx: therapeutic heparin drip for DVT, FCDs at all times when not ambulating  Abx: vanc, zosyn  Labs: lactate 12.2, WBC 20.6, Hb 9.3  Drain: none  Wiggins: none    Dispo: Patient is decompensating. Continue ICU care. Prognosis guarded.            Jaylan Hairston MD  Orthopedics  [facility]    Attg Note:  I agree with the resident's assessment and plan. Patient remains in CCU.  Increasing vasopressor requirements overnight.    Dominick Desai MD

## 2019-07-20 NOTE — NURSING
Dr. Ngo to bedside to clarify family's questions about DNR status and comfort care. Emotional support provided, no further questions at this time. SYDNEY.

## 2019-07-20 NOTE — PROGRESS NOTES
Update from Nephrology:    TPA catheter x 2hrs  Increase pre-filter PNSS to 250cc/hr  UF rate 250-300cc/hhr

## 2019-07-21 NOTE — PROGRESS NOTES
Dr. Richey came to bedside. CRRT machine rinsed back, IABP stopped, and drips turned off. Prn ativan and morphine given for comfort. Family at bedside.

## 2019-07-21 NOTE — DISCHARGE SUMMARY
[facility]  Cardiology  Discharge Summary      Patient Name: Jenni Todd  MRN: [id number]  Admission Date: 7/6/2019  Hospital Length of Stay: 9 days  Discharge Date and Time:  07/20/2019 9:52 PM  Attending Physician: Vadim Ngo MD    Discharging Provider: Kimo Richey MD  Primary Care Physician: Michael Tan Iii, MD    HPI:   Reason for admission shock with associated inferior STEMI.    This is a 48 woman with hx of ESRD on HD (R femoral trialysis), numerous DVT with associated occlusion of the RIJ and LIJ, DMII, HTN, HLD,GIB, 3V CAD that is not able to be revascularized, and obesity. She initially presented 07/06 after a fall, found to have R tibial fracture with Ex-fix on 7/6 and ORIF on 07/11. Post-op she had several issues that include non-compliance with DVT prophylaxis, thrombosis of her catheter (and prior thrombosis of her fistulas), hypotension after HD requiring midodrine, and persisting constipation. On 7/17, she had symptmatic hypotension workup showed acute thrombi in bilateral iliac and femoral veins, requiring initiation of heparin gtt. Subsequently had a drop in Hgb requiring 1U, vascular surgery deemed an IVC filter to be of little additional value. Critical care consulted 7/18 after recurrent hypotension to 60s/40s, responsive to 500 cc bolus, Vanc/zosyn was started and workup was started for infection. Did ok overnight, but had more nausea, vomiting and chest pressure this morning in the setting of hypotension, requiring emergent intubation. EKG suspicious for acute MI in inferior leads, she was evaluated by interventional who suspected demand ischemia, inserted an IABP at bedside with subsequent normalization of EKG changes.    On exam abdomen was tense and without bowel sounds, echo performed at bedside showed no RV strain and persistently reduced LVEF. Patient required additional central access to allow pressor administration during CRRT, access only able  to be obtained through left axillary vein with the assistance of critical care surgery team.    Procedure(s) (LRB):  ORIF, FRACTURE, TIBIA, PLATEAU (Right)  REMOVAL, EXTERNAL FIXATION DEVICE (Right)     Indwelling Lines/Drains at time of discharge:  Lines/Drains/Airways     Central Venous Catheter Line                 Hemodialysis Catheter 07/11/19 right femoral 9 days         Percutaneous Central Line Insertion/Assessment - triple lumen  07/19/19 1500 left subclavian 1 day          Drain                 Hemodialysis AV Fistula Left upper arm -- days         Hemodialysis AV Graft Right upper arm -- days         NG/OG Tube 07/19/19 0928 nasogastric Right nostril 1 day          Airway                 Airway - Non-Surgical 07/19/19 1030 Endotracheal Tube 1 day          Line                 IABP 07/19/19 1110 8.0 Fr. 40 mL 1 day                Hospital Course:  This is a 48 woman with hx of ESRD on HD (R femoral trialysis), numerous DVT with associated occlusion of the RIJ and LIJ, DMII, HTN, HLD,GIB, 3V CAD that is not able to be revascularized, and obesity. She initially presented 07/06 after a fall, found to have R tibial fracture with Ex-fix on 7/6 and ORIF on 07/11. Post-op she had several issues that include non-compliance with DVT prophylaxis, thrombosis of her catheter (and prior thrombosis of her fistulas), hypotension after HD requiring midodrine, and persisting constipation. On 7/17, she had symptmatic hypotension workup showed acute thrombi in bilateral iliac and femoral veins, requiring initiation of heparin gtt. Subsequently had a drop in Hgb requiring 1U, vascular surgery deemed an IVC filter to be of little additional value. Critical care consulted 7/18 after recurrent hypotension to 60s/40s, responsive to 500 cc bolus, Vanc/zosyn was started and workup was started for infection. Did ok overnight, but had more nausea, vomiting and chest pressure this morning in the setting of hypotension, requiring  emergent intubation. EKG suspicious for acute MI in inferior leads, she was evaluated by interventional who suspected demand ischemia, inserted an IABP at bedside with subsequent normalization of EKG changes. Workup showed no acute abdomen, PE were present without associated RV strain, and imaging showed progression of intra-abdominal and intra-thoracic fluid collections consistent with progressive heart failure in the setting of insufficient diuresis due to decreasing cardiac function. Her decompensation appears to have been the result of progressive fluid overload that culminated in cardiogenic shock with assocaited ischemia, transiently improved by intra-aortic balloon pump. However, given her advanced disease and lack of advanced options, this modality proved to be a temporary measure and within 24 hours following admission, pressor support had to be escalated to four medications as her clinical status continued to decline. Goals of care were discussed and the decision for DNR was made, with continued medical therapy to try to optimize hemodynamic status until family could arrive and/or the patient improved.     Notified by day team that patient is DNR and family is awaiting daughter to arrive to withdraw care. Called by nurse and informed that daughter and family were here. I went to bedside and discuss that patient is currently in maximal support. She has multi organ failure as evidenced by IABP/pressors, ventilator, dialysis. She continues to deteriorate despite all this measures. Family understood and no questions were answered as they were already aware of this information from day team. I confirmed DNR. I was later notified that patient's family was ready for withdrawal of care. Orders for morphine and ativan were ordered. Drips, IABP were stopped. Ventilator was initially wet to RR of 8 and eventually turned off. After a few minutes, she went into asystole.     No heart sounds auscultated. Pupils fixed  and dilated. No response to painful stimuli. No spontaneous breaths. She was pronounced death at 2147.     Consults:   Consults (From admission, onward)        Status Ordering Provider     Inpatient consult to Cardiology  Once     Provider:  (Not yet assigned)    Completed WINTERBOTTOM, FIONA     Inpatient consult to Nephrology  Once     Provider:  (Not yet assigned)    Completed GIACOMO MURILLO     Inpatient consult to Nephrology  Once     Provider:  (Not yet assigned)    Completed LAURIE PARRA     Inpatient consult to Orthopedics  Once     Provider:  (Not yet assigned)    Completed GIACOMO MURILLO     Inpatient consult to Physical Medicine Rehab  Once     Provider:  (Not yet assigned)    Completed ORLANDO ELIZONDO     Inpatient consult to PICC team ([facility])  Once     Provider:  (Not yet assigned)    Completed YUSUF SHEPARD     Inpatient consult to Registered Dietitian/Nutritionist  Once     Provider:  (Not yet assigned)    Completed MYRIAM CASTELLANOS     Inpatient consult to [facility]  Once     Provider:  (Not yet assigned)    Completed FLORA AGUSTIN     Inpatient consult to Vascular Surgery  Once     Provider:  (Not yet assigned)    Completed FLORA AGUSTIN          Final Active Diagnoses:    Diagnosis Date Noted POA    PRINCIPAL PROBLEM:  Shock [R57.9] 07/19/2019 Yes    Pleural effusion [J90] 07/20/2019 No    CLABSI (central line-associated bloodstream infection) [T80.211A] 07/18/2019 Unknown    Impaired mobility and ADLs [Z74.09] 07/17/2019 No    Acute deep vein thrombosis (DVT) of proximal vein of both lower extremities [I82.4Y3] 07/17/2019 No    Acute deep vein thrombosis (DVT) of both lower extremities [I82.403] 07/17/2019 Unknown    Closed fracture of right tibial plateau [S82.141A] 07/06/2019 Unknown    Hypotension [I95.9] 06/25/2019 Yes    Hemodialysis-associated hypotension [I95.3]  Yes    ESRD (end stage renal disease) on dialysis [N18.6, Z99.2] 09/12/2018 Not Applicable     Acute hypoxemic respiratory failure [J96.01] 2017 Unknown    Hypertension associated with diabetes [E11.59, I10]  Unknown    Diabetic neuropathy associated with type 2 diabetes mellitus [E11.40]  Yes    Hyperlipidemia [E78.5]  Yes    Anemia in chronic kidney disease, on chronic dialysis [N18.6, D63.1, Z99.2]  Not Applicable    CAD (coronary artery disease) [I25.10]  Yes      Problems Resolved During this Admission:    Diagnosis Date Noted Date Resolved POA    Type 2 diabetes mellitus with hyperglycemia, with long-term current use of insulin [E11.65, Z79.4] 2019 Not Applicable     No new Assessment & Plan notes have been filed under this hospital service since the last note was generated.  Service: Cardiology      Discharged Condition:     Disposition:  in Medical Facility    Time spent on the discharge of patient: 35 minutes    Kimo Richey MD  Cardiology  [facility]

## 2019-07-21 NOTE — PROGRESS NOTES
Patient's family expressed wishes to withdraw care. Dr. Richey and  notified. Awaiting new orders.

## 2019-07-21 NOTE — PLAN OF CARE
CCU Update    Notified by day team that patient is DNR and family is awaiting daughter to arrive to withdraw care. Called by nurse and informed that daughter and family were here. I went to bedside and discuss that patient is currently in maximal support. She has multi organ failure as evidenced by IABP/pressors, ventilator, dialysis. She continues to deteriorate despite all this measures. Family understood and no questions were answered as they were already aware of this information from day team. I confirmed DNR. I was later notified that patient's family was ready for withdrawal of care. Orders for morphine and ativan were ordered. Drips, IABP were stopped. Ventilator was initially wet to RR of 8 and eventually turned off. After a few minutes, she went into asystole.    No heart sounds auscultated. Pupils fixed and dilated. No response to painful stimuli. No spontaneous breaths. She was pronounced death at 2147.     Notified Dr. Ngo.

## 2019-07-21 NOTE — PROGRESS NOTES
At beginning of shift patient's augmented pressures dropped into the 50s. Epi up from 0.8 to 0.9, levo up from 1.2 to 1.6, vaso up from 0.08 to 0.1, blood glucose checked <35, d10 bolus given per order. SaO2 reading in the mid 80s. Patients blood glucose now up to the 100s. Patient's daughter at bedside along with sister and other family members. Dr. Richey notified of all of the above and came to bedside. Dr. Richey spoke with patient's family and answered all questions.  also came to bedside. New order received for d10 drip at 30cc/hr. Fio2 increased to 70%, per Dr. Richey titrate FiO2 for SaO2 of 88% or greater. Will continue to monitor closely.

## 2019-07-22 ENCOUNTER — TELEPHONE (OUTPATIENT)
Dept: INFECTIOUS DISEASES | Facility: CLINIC | Age: 49
End: 2019-07-22

## 2019-07-22 LAB
BACTERIA BLD CULT: NORMAL
BACTERIA BLD CULT: NORMAL

## 2019-07-22 NOTE — TELEPHONE ENCOUNTER
Spoke with Nikki Seo at [facility] and confirmed the pt was . She wasn't aware that the pt was  and was seeking clearance for surgery for thept

## 2019-07-22 NOTE — TELEPHONE ENCOUNTER
----- Message from Yareli Carlisle sent at 2019 11:44 AM CDT -----  Contact: Nikki Seo /   [facility] [phone]  Type: Ms Seo states calling to get clearance for patient  to have surgery.    I am showing in the system that this patient is   Who Called: Patient  Symptoms (please be specific):   How long has patient had these symptoms:   Pharmacy name and phone #:    Would the patient rather a call back or a response via [facility]?  call    Best Call Back Number: [phone]  Additional Information:

## 2019-07-23 LAB
BACTERIA BLD CULT: NORMAL
BACTERIA BLD CULT: NORMAL

## 2019-07-25 ENCOUNTER — POST MORTEM DOCUMENTATION (OUTPATIENT)
Dept: TRANSPLANT | Facility: CLINIC | Age: 49
End: 2019-07-25

## 2019-07-25 NOTE — PROGRESS NOTES
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 Relatives, friends, employees, and management of [facility] are invited to attend her Mass of Bahai Burial on 2019, 1:00 PM at [facility],  KATHY Escobedo, Celebrant. Visitation 11:30 AM until Mass. [facility]. She was preceded in death by her mother, Bridgett Todd; grandparents, Lindsay Briana, Delfin Todd, Alexey and Ofe Johnson. She leaves to cherish her salty memories 1 daughter, Rosalba Todd; father, Rock Todd, Sr.; 3 siblings, Rock Todd, ., Sari Langehenry uFentes (Raymundo) and Julieta Todd; 2 nieces, Luisa Gina and Kenia Lagunas; 1 nephew, Osito Lagunas and a host of aunts, uncles, other relatives and friends. Services entrusted to the Caring Staff of: [employer]" [address], [place] [address]. [phone]  "

## 2019-08-05 NOTE — PHYSICIAN QUERY
PT Name: Jenni Todd  MR #: [id number]     Physician Query Form - Diagnosis Clarification      CDS/: CAMILA Jones, RN, CCDS               Contact information: [email or web address]    This form is a permanent document in the medical record.     Query Date: August 5, 2019    By submitting this query, we are merely seeking further clarification of documentation.  Please utilize your independent clinical judgment when addressing the question(s) below.     The medical record contains the following:      Findings Supporting Clinical Information Location in Medical Record   CLABSI (central line-associated bloodstream infection)   Given her persistent worsening hypotension and leukocytosis, there is high concern for potential CLABSI in her right femoral dialysis line.  Afebrile, no other SIRS physiology to suggest sepsis at this time.  No other etiology of this worsening hypotension. Lower concern from obstructive shock from a saddle embolus.      Plan:  - Performed passive leg raise with improvement in mentation & BP  - Ordered 500 mL IV normal saline bolus  - lactate  - blood cultures peripheral and from line  - TTE  - hold sedating agents including gabapentin  - continue heparin drip   - vancomycin load, Pharm D consult  - piperacillin-tazobactam eGFR <20 dosing  - appreciate critical care consult  - transfer to step-down unit    No growth after 5 days    No growth after 5 days      CLABSI (central line-associated bloodstream infection)  - Performed passive leg raise with improvement in mentation & BP  - Ordered 500 mL IV normal saline bolus  - lactate  - blood cultures peripheral and from line  - TTE  - hold sedating agents including gabapentin  - continue heparin drip   - vancomycin load, Pharm D consult  - piperacillin-tazobactam eGFR <20 dosing  - appreciate critical care consult    Critical care consulted 7/18 after recurrent hypotension to 60s/40s, responsive to 500 cc bolus, Vanc/zosyn was started and  workup was started for infection. Did ok overnight, but had more nausea, vomiting and chest pressure this morning in the setting of hypotension, requiring emergent intubation. EKG suspicious for acute MI in inferior leads, she was evaluated by interventional who suspected demand ischemia, inserted an IABP at bedside with subsequent normalization of EKG changes. Workup showed no acute abdomen, PE were present without associated RV strain, and imaging showed progression of intra-abdominal and intra-thoracic fluid collections consistent with progressive heart failure in the setting of insufficient diuresis due to decreasing cardiac function. Her decompensation appears to have been the result of progressive fluid overload that culminated in cardiogenic shock with assocaited ischemia, transiently improved by intra-aortic balloon pump.     Final Active Diagnoses:   CLABSI (central line-associated bloodstream infection) 7/18 Hospital Medicine Significant Event Note filed 1:16PM by Dr. Llamas                                        7/18 1406 Blood Culture From dialysis line if possible AND peripheral stick  7/18 1430 Blood Culture Set #2 from dialysis line    7/19 Hospital Medicine Progress Note by Dr. Phillips/Dr. Llamas filed 7/20 1:56PM                      7/20 Discharge Summary by Cardiology Dr. Richey/Dr. Ngo filed 7/21 9:08PM     Please clarify if the CLABSI (central line-associated bloodstream infection) diagnosis has been:    [  ] Ruled In   [ XXX ] Ruled Out - cultures negative   [  ] Other/Clarification of findings (please specify):     [  ] Clinically undetermined     Please document in your progress notes daily for the duration of treatment, until resolved, and include in your discharge summary.

## 2019-09-11 RX ORDER — ERGOCALCIFEROL 1.25 MG/1
CAPSULE ORAL
Qty: 20 CAPSULE | Refills: 0 | OUTPATIENT
Start: 2019-09-11

## 2020-01-09 NOTE — PLAN OF CARE
VN note: VN cued into pt's room for introduction. Patient sleeping at this time.  Patient does not appear to be in any pain or discomfort. VN will continue to be available to patient and intervene prn.        03/08/19 0944   Type of Frequent Check   Type Patient Rounds  (VN rounding)   Safety/Activity   Patient Rounds visualized patient;ID band on;call light in patient/parent reach   Safety Promotion/Fall Prevention side rails raised x 2   Safety Precautions emergency equipment at bedside   Positioning   Body Position side-lying, right   Head of Bed (HOB) HOB at 15 degrees   Pain/Comfort/Sleep   Preferred Pain Scale number (Numeric Rating Pain Scale)   Comfort/Acceptable Pain Level 5   Pain Rating (0-10): Rest 0       Cardiac/Telemetry Details / Alarms   Cardiac/Telemetry Monitor On No  (per floor nurse the patient is refusing to wear it)   Cardiac/Telemetry Audible No   Cardiac/Telemetry Alarms Set No   Assessments (Pre/Post)   Level of Consciousness (AVPU) responds to voice      No Yes

## 2020-08-31 NOTE — ASSESSMENT & PLAN NOTE
--PT/OT   Rotation Flap Text: The defect edges were debeveled with a #15 scalpel blade.  Given the location of the defect, shape of the defect and the proximity to free margins a rotation flap was deemed most appropriate.  Using a sterile surgical marker, an appropriate rotation flap was drawn incorporating the defect and placing the expected incisions within the relaxed skin tension lines where possible.    The area thus outlined was incised deep to adipose tissue with a #15 scalpel blade.  The skin margins were undermined to an appropriate distance in all directions utilizing iris scissors.

## 2021-03-30 NOTE — PT/OT/SLP EVAL
Physical Therapy Evaluation    Patient Name:  Jenni Todd   MRN:  [id number]    Recommendations:     Discharge Recommendations:  home health PT   Discharge Equipment Recommendations: none   Barriers to discharge: None    Assessment:     Jenni Todd is a 48 y.o. female admitted with a medical diagnosis of Hemodialysis catheter dysfunction.  She presents with the following impairments/functional limitations:  weakness, gait instability, impaired balance, impaired endurance, impaired self care skills, impaired functional mobilty.  Pt demo decreased functional mobility secondary to weakness and fatigue.  Performed transfer c mod A and gait c CGA using RW.  Pt amb short distance in room and demo decreased gait speed, FFP, narrow SACHIN, mild unsteadiness, c/o fatigue.  Pt safe to ambulate c assistance of 1x person and RW.  Pt would benefit from continued skilled acute PT 3x/wk to improve functional mobility.  Recommending pt receive PT services in  setting following d/c from hospital once medically cleared.      Rehab Prognosis: Good; patient would benefit from acute skilled PT services to address these deficits and reach maximum level of function.    Recent Surgery: Procedure(s) (LRB):  Insertion, Catheter, Central Venous, Hemodialysis (N/A) 1 Day Post-Op    Plan:     During this hospitalization, patient to be seen 3 x/week to address the identified rehab impairments via gait training, therapeutic activities, therapeutic exercises, neuromuscular re-education and progress toward the following goals:    · Plan of Care Expires:  04/16/19    Subjective     Chief Complaint: fatigue  Patient/Family Comments/goals: to return home  Pain/Comfort:  · Pain Rating 1: 0/10    Patients cultural, spiritual, [employer] conflicts given the current situation: no    Living Environment:  Pt lives c daughters in [facility] c [place].  PTA driving, working and no falls.  Prior to admission, patients level of function was independent and using  rollator for mobility.  Equipment used at home: rollator, grab bar, shower chair.  DME owned (not currently used): none.  Upon discharge, patient will have assistance from family.    Objective:     Communicated with RN and OT prior to session.  Patient found [facility] pulse ox (continuous), telemetry, peripheral IV  upon PT entry to room.    General Precautions: Standard, fall   Orthopedic Precautions:N/A   Braces: N/A     Exams:  · Cognitive Exam:  Patient is oriented to Person, Place, Time and Situation  · Gross Motor Coordination:  WFL  · Sensation:    · -       Intact  · RLE ROM: WFL  · RLE Strength: WFL  · LLE ROM: WFL  · LLE Strength: WFL    Functional Mobility:  · Transfers:     · Sit to Stand:  moderate assistance with rolling walker  · Requiring cues for proper hand placement  · Difficulty initiating stand transfer would benefit from cues for increased forward trunk lean  · Gait: 20ft c RW CGA   · decreased gait speed, FFP, narrow SACHIN, mild unsteadiness, c/o fatigue  · Balance: sitting (S); standing (CGA)      Therapeutic Activities and Exercises:  Pt educated on: PT role/POC; safety c mobility; benefits of OOB activities; performing therex; d/c recs - v/u  -BP: 187/107 following session: RN notified    AM-PAC 6 CLICK MOBILITY  Total Score:17     Patient left up in chair with all lines intact, call button in reach, RN notified and daughter present.    GOALS:   Multidisciplinary Problems     Physical Therapy Goals        Problem: Physical Therapy Goal    Goal Priority Disciplines Outcome Goal Variances Interventions   Physical Therapy Goal     PT, PT/OT Ongoing (interventions implemented as appropriate)     Description:  Goals to be met by: 2019     Patient will increase functional independence with mobility by performin. Supine to sit with Modified [place]  2. Sit to supine with Modified [place]  3. Sit to stand transfer with Modified [place]  4. Gait  x 150 feet with Supervision using  Rolling Walker.   5. Ascend/Descend 4 inch curb step with Supervision using Rolling Walker.                      History:     Past Medical History:   Diagnosis Date    Abnormal finding on Pap smear, HPV DNA positive     Anemia associated with chronic renal failure     on Epogen    Blood type B+     Bulging discs - symptomatic      CAD (coronary artery disease)     Cardiomyopathy 3/13/2019    Diabetes mellitus, type 2     ESRD (end stage renal disease) 2004    FSGS (focal segmental glomerulosclerosis)     with collapsing    Hyperlipidemia     Hypertension     Neuropathy     Obesity     Secondary hyperparathyroidism, renal     TIA (transient ischemic attack)     Uterine fibroid     small uterine        Past Surgical History:   Procedure Laterality Date    CARDIAC CATHETERIZATION      PCI x 2     SECTION, CLASSIC      COLONOSCOPY N/A 2018    Performed [facility] Rodrigue Russell [facility] (2ND FLR)    DEBRIDEMENT-WOUND Left 2016    Performed by [facility] at [facility] OR    DIALYSIS FISTULA CREATION      multiple fistulas and grafts before PD     EGD (ESOPHAGOGASTRODUODENOSCOPY) N/A 3/14/2019    Performed by [facility] at [facility] ENDO    EGD (ESOPHAGOGASTRODUODENOSCOPY) N/A 3/13/2019    Performed by [facility]olph [facility]ila [facility] at [facility] ENDO    INCISION AND DRAINAGE (I&D), LABIAL N/A 2016    Performed by Harmony Fernandez MD at [facility] OR    INCISION AND DRAINAGE (I&D), LABIAL (ADD ON) Left 2016    Performed by [facility]ressa Maddox MD at [facility] OR    INCISION AND DRAINAGE OF WOUND Left     VULVAR ABCESS WITH NECROSIS    INSERTION, CATHETER, TUNNELED ABORTED Left 3/14/2019    Performed by Servando Solis [facility] at [facility] OR    ORIF, HIP Left 2018    Performed by Tevin Grullon MD at [facility] OR    PERITONEAL CATHETER INSERTION      PERMCATH REWIRE- TUNNELED CATH REWIRE Left 2017    Performed by Baldev Munroe MD at [facility] CATH LAB    PERMCATH  REWIRE- TUNNELED CATH REWIRE N/A 10/5/2017    [facility] at [facility]ATH LAB    REMOVAL, CATHETER, DIALYSIS, PERITONEAL N/A 3/14/2019    [facility] by [facility] at [facility] OR    UMBILICAL HERNIA REPAIR         Time Tracking:     PT Received On: 03/19/19  PT Start Time: 0934     PT Stop Time: 0948  PT Total Time (min): 14 min     Billable Minutes: Evaluation 14 min      Adolph Zayas, PT  03/19/2019   Cigarettes

## 2021-09-29 NOTE — SUBJECTIVE & OBJECTIVE
[address] [place] [address]  Dept: [phone]  Dept Fax: [phone]  Loc: [phone]    Visit Date: 9/29/2021    TELEPSYCHIATRY VISIT -- Audio/Visual (During [facility] public health emergency)     Nicholas Hughes is a 38 y.o. female being evaluated by a Virtual Visit (video visit) encounter to address concerns as mentioned  below. A caregiver was present when appropriate. Pursuant to the emergency declaration under the [address] [place] [address], [address] [place] [address] authority and the [employer] Act, this Virtual Visit was conducted with patient's (and/or legal guardian's) consent, to reduce the patient's risk of exposure to COVID-19 and provide necessary medical care. The patient (and/or legal guardian) has also been advised to contact this office for worsening conditions or problems, and seek emergency medical treatment and/or call 911 if deemed necessary. Services were provided through a video synchronous discussion virtually to substitute for in-person clinic visit. Patient was her office and provider was at her individual home. SUBJECTIVE DATA     CHIEF COMPLAINT:    Chief Complaint   Patient presents with   [address] Problem    Follow-up       History obtained from: patient    HISTORY OF PRESENT ILLNESS:    Nicholas Hughes is a 38 y.o. female who presents via virtual video visit for follow-up on complaints of depression and anxiety. Going \"alright\"  Mood swings and irritability have not been the best.   Irritability is significantly higher  Feels like everyone always wants something but she doesn't have anything to give. States she \"cycles through these mood things pretty quick\"  -the mood changes last for a couple of days at a time    Endorses situational stressors contributing to her negative mood.     States \"I have more anxiety Past Medical History:   Diagnosis Date    Abnormal finding on Pap smear, HPV DNA positive     Anemia associated with chronic renal failure     on Epogen    Blood type B+     Bulging discs - symptomatic      CAD (coronary artery disease)     Cardiomyopathy 3/13/2019    Diabetes mellitus, type 2     ESRD (end stage renal disease) 2004    FSGS (focal segmental glomerulosclerosis)     with collapsing    Hyperlipidemia     Hypertension     Neuropathy     NSTEMI (non-ST elevated myocardial infarction) 3/29/2019    Obesity     Secondary hyperparathyroidism, renal     TIA (transient ischemic attack)     Uterine fibroid     small uterine        Past Surgical History:   Procedure Laterality Date    ANGIOGRAM, CORONARY ARTERY N/A 4/15/2019    Performed by [facility] at [facility] CATH LAB    CARDIAC CATHETERIZATION      PCI x 2     SECTION, CLASSIC      COLONOSCOPY N/A 2018    Performed by Rodrigue Russell MD at [facility] ENDO (2ND FLR)    DEBRIDEMENT-WOUND Left 2016    Performed by [facility]a Maddox MD at [facility] OR    DIALYSIS FISTULA CREATION      multiple fistulas and grafts before PD     EGD (ESOPHAGOGASTRODUODENOSCOPY) N/A 3/14/2019    Performed by Adolph [facility]ila [facility] at [facility] ENDO    EGD (ESOPHAGOGASTRODUODENOSCOPY) N/A 3/13/2019    Performed by Adolph [facility]ila [facility] at [facility] ENDO    INCISION AND DRAINAGE (I&D), LABIAL N/A 2016    Performed by Harmony Fernandez MD at [facility] OR    INCISION AND DRAINAGE (I&D), LABIAL (ADD ON) Left 2016    Performed by [facility]ressa Maddox MD at [facility] OR    INCISION AND DRAINAGE OF WOUND Left     VULVAR ABCESS WITH NECROSIS    Insertion, Catheter, Central Venous, Hemodialysis N/A 3/28/2019    Performed by [facility]o Weeks MD at [facility] CATH LAB    Insertion, Catheter, Central Venous, Hemodialysis N/A 3/18/2019    [facility]formed by [facility] at [facility] CATH LAB    INSERTION, CATHETER, TUNNELED ABORTED Left 3/14/2019     Performed by Servando Solis MD at [facility] OR    INSERTION, GRAFT, ARTERIOVENOUS, RIGHT ARM Right 3/22/2019    Performed by BESSIE Wynn III, MD at [facility] OR 2ND FLR    INSERTION, INTRA-AORTIC BALLOON PUMP  4/15/2019    Performed by Roland [facility]ier MD at [facility] CATH LAB    Left heart cath Left 4/15/2019    Performed by Roland [facility]ier MD at [facility] CATH LAB    ORIF, HIP Left 9/13/2018    Performed by [facility] at [facility] OR    PERITONEAL CATHETER INSERTION      PERMCATH REWIRE- TUNNELED CATH REWIRE Left 11/13/2017    Performed by Baldev Munroe MD at [facility] CATH LAB    PERMCATH REWIRE- TUNNELED CATH REWIRE N/A 10/5/2017    Performed by Baldev Munroe MD at [facility] CATH LAB    REMOVAL, CATHETER, DIALYSIS, PERITONEAL N/A 3/14/2019    Performed by [facility] at [facility] OR    REPLACEMENT, CATHETER, DIALYSIS, OVER GUIDEWIRE, USING EXISTING VENOUS ACCESS Left 5/24/2019    Performed by [facility]roe MD at [facility] CATH LAB    UMBILICAL HERNIA REPAIR      Venogram, possible intervention Right 3/20/2019    Performed by BESSIE Wynn III, MD at [facility] CATH LAB       Review of patient's allergies indicates:   Allergen Reactions    Clindamycin Diarrhea    Flagyl [metronidazole hcl]     Metronidazole        No current facility-administered medications on file prior to encounter.      Current Outpatient Medications on File Prior to Encounter   Medication Sig    aspirin (ECOTRIN) 81 MG EC tablet Take 1 tablet (81 mg total) by mouth once daily.    calcitRIOL (ROCALTROL) 0.25 MCG Cap Take 0.5 mcg by mouth once daily.     calcium carbonate (CALCI-CHEW) 500 mg calcium (1,250 mg) chewable tablet Take 500 mg by mouth.    cetirizine (ZYRTEC) 5 MG tablet Take 1 tablet (5 mg total) by mouth once daily.    cinacalcet (SENSIPAR) 90 MG Tab Take 90 mg by mouth once daily.    ciprofloxacin HCl (CIPRO) 500 MG tablet Take 1 tablet (500 mg total) by mouth once daily. for 10 days    clopidogrel (PLAVIX) 75 mg  than I ever did. \"      Deep breathing has been helpful. States she is trying to use this more to help relax. States she has not been well since 2018 with regards to her mood  -mood has been getting worse since that time    States she is still using marijuana on occasion to help with her mood. -states she will get \"high\" to be able to function and interact with others    Denies suicidal ideations, intent, plan. No homicidal ideations, intent, plan. No audiovisual hallucinations. HPI    The patient has had 1 lifetime suicide attempts. Methods used for the suicide attempts include overdose on medications. The patient's most recent suicide attempt was 2007. Patient reports 1 psych hospital admissions with the last admission taking place 2007. NOTE: Patient states she did attempt suicide as a teenager but does not recall the number of times, methods or number of hospitalizations. The above information reflects SA as an adult. Past psychiatric medications include: \"too many to list\" Prozac, Zoloft, Celexa, Cymbalta, BuSpar, Wellbutrin, Trintellix, Paxil    Adverse reactions from psychotropic medications:  None      Current Psychiatric Review of Systems         Jazlyn or Hypomania:  no     Panic Attacks:  no     Phobias:  no     Obsessions and Compulsions:  no     Body or Vocal Tics:  no     Hallucinations:  no     Delusions:  no    SOCIAL HISTORY:  Patient was born in [place] and raised by her maternal grandparents      Social History     Socioeconomic History    Marital status:      Spouse name: Not on file    Number of children: 9    Years of education: Not on file    Highest education level: Master's degree (e.g., MA, MS, Selena, MEd, MSW, LINDA)   Occupational History    Occupation: advocacy coordinator   Tobacco Use    Smoking status: Never Smoker    Smokeless tobacco: Never Used   Vaping Use    Vaping Use: Never used   Substance and Sexual Activity    Alcohol use: No    Drug use:  Yes "tablet Take 1 tablet (75 mg total) by mouth once daily.    ferrous sulfate 325 (65 FE) MG EC tablet Take 1 tablet (325 mg total) by mouth 2 (two) times daily.    gabapentin (NEURONTIN) 300 MG capsule Take 1 capsule (300 mg total) by mouth 2 (two) times daily.    magnesium oxide (MAG-OX) 400 mg (241.3 mg magnesium) tablet Take 1 tablet (400 mg total) by mouth 3 (three) times daily.    midodrine (PROAMATINE) 10 MG tablet Take 1 tablet (10 mg total) by mouth 3 (three) times daily with meals.    sevelamer carbonate (RENVELA) 800 mg Tab Take 1 tablet (800 mg total) by mouth 3 (three) times daily with meals.    acetaminophen (TYLENOL) 325 MG tablet Take 650 mg by mouth every 6 (six) hours as needed for Pain.    atorvastatin (LIPITOR) 40 MG tablet Take 40 mg by mouth every evening.     [] dextromethorphan-guaifenesin  mg (MUCINEX DM)  mg per 12 hr tablet Take 2 tablets by mouth 2 (two) times daily. for 10 days    insulin aspart U-100 (NOVOLOG) 100 unit/mL (3 mL) InPn pen Inject 0-5 Units into the skin before meals and at bedtime as needed (Hyperglycemia).    ONETOUCH VERIO Strp USE 1 STRIP ONCE DAILY IN VITRO    pantoprazole (PROTONIX) 40 MG tablet Take 1 tablet (40 mg total) by mouth once daily.    pen needle, diabetic 31 gauge x 3/16" Ndle 1 each by Misc.(Non-Drug; Combo Route) route 4 (four) times daily before meals and nightly.    senna-docusate 8.6-50 mg (PERICOLACE) 8.6-50 mg per tablet Take 1 tablet by mouth 2 (two) times daily as needed for Constipation.    vitamin renal formula, B-complex-vitamin c-folic acid, (B COMPLEX-C-FOLIC ACID) 1 mg Cap Take 1 capsule by mouth once daily. 1 Capsule Oral Every day     Family History     Problem Relation (Age of Onset)    Cancer Maternal Grandmother, Paternal Grandfather    Diabetes Maternal Aunt, Paternal Aunt        Tobacco Use    Smoking status: Never Smoker    Smokeless tobacco: Never Used   Substance and Sexual Activity    Alcohol use: " Types: Marijuana     Comment: medical marijuana    Sexual activity: Yes     Partners: Male   Other Topics Concern    Not on file   Social History Narrative    05/24/2021    LEVEL OF EDUCATION: graduated high school; earned her bachelor degrees in both social work and criminal justice; earned her master's degree social work    SPECIAL EDUCATION NEEDS: None    RESIDENCE: Currently lives with her children; mom    LEGAL HISTORY: None    Episcopal: None    TRAUMA: Yes - does not want to disclose about this at this time    : None    HOBBIES: crafts; spending time with her children    EMPLOYMENT: 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 MARRIAGES: two. The first marriage was in 2004 and they  after 1.5 years. Second marriage lasted until 2016. They are  after 15 years. CHILDREN: 7     SUBSTANCE USE:    1. Marijuana: medical marijuana - using 2 or 3 times per week. Tried it in high school but didn't start using the medical marijuana until Aug. 2020. Admits she has been using more recently because she has more stressors    2. Cocaine: last use was [place] couple weeks ago\"     Social Determinants of Health     Financial Resource Strain: Low Risk     Difficulty of Paying Living Expenses: Not hard at all   Food Insecurity: No Food Insecurity    Worried About Running Out of Food in the Last Year: Never true    Luz of Food in the Last Year: Never true   Transportation Needs: No Transportation Needs    Lack of Transportation (Medical): No    Lack of Transportation (Non-Medical):  No   Physical Activity:     Days of Exercise per Week:     Minutes of Exercise per Session:    Stress:     Feeling of Stress :    Social Connections:     Frequency of Communication with Friends and Family:     Frequency of Social Gatherings with Friends and Family:     Attends Orthodox Services:     Active Member of Clubs or No     Comment: Pt reports some social use of about 1-2 drinks about every six months.    Drug use: No    Sexual activity: Not Currently     Partners: Male     Birth control/protection: None     Review of Systems   Constitutional: Negative for chills, diaphoresis, fatigue and fever.   HENT: Negative for congestion, hearing loss, rhinorrhea, sinus pressure and trouble swallowing.    Respiratory: Negative for cough, chest tightness, shortness of breath and wheezing.    Cardiovascular: Negative for chest pain, palpitations and leg swelling.   Gastrointestinal: Negative for abdominal distention, abdominal pain, diarrhea, nausea and vomiting.   Musculoskeletal: Negative for back pain, gait problem, joint swelling and neck pain.   Skin: Negative for rash and wound.   Neurological: Negative for dizziness, seizures, facial asymmetry, speech difficulty, weakness and light-headedness.   Psychiatric/Behavioral: Negative for agitation, behavioral problems, confusion and dysphoric mood. The patient is not nervous/anxious.      Objective:     Vital Signs (Most Recent):  Temp: 98.8 °F (37.1 °C) (05/29/19 1404)  Pulse: 86 (05/29/19 1504)  Resp: (!) 24 (05/29/19 1504)  BP: 114/81 (05/29/19 1504)  SpO2: 100 % (05/29/19 1504) Vital Signs (24h Range):  Temp:  [98.4 °F (36.9 °C)-98.8 °F (37.1 °C)] 98.8 °F (37.1 °C)  Pulse:  [86-92] 86  Resp:  [18-24] 24  SpO2:  [99 %-100 %] 100 %  BP: (103-114)/(68-81) 114/81     Weight: 95.7 kg (211 lb)  Body mass index is 32.56 kg/m².    Physical Exam   Constitutional: She is oriented to person, place, and time. She appears well-developed. No distress.   HENT:   Head: Normocephalic and atraumatic.   Eyes: Conjunctivae and EOM are normal. Right eye exhibits no discharge. Left eye exhibits no discharge. No scleral icterus.   Neck: Normal range of motion. Neck supple. No JVD present. No tracheal deviation present.   Cardiovascular: Normal rate, regular rhythm, normal heart sounds and intact distal  30 capsule 1    aMILoride (MIDAMOR) 5 MG tablet Take 1 tablet by mouth 2 times daily 60 tablet 3    potassium bicarbonate (EFFER-K) 25 MEQ disintegrating tablet Take 1 tablet by mouth 3 times daily 90 tablet 3    levothyroxine (SYNTHROID) 125 MCG tablet take 1 tablet by mouth once daily 90 tablet 0    tiZANidine (ZANAFLEX) 2 MG tablet Take 1 tablet by mouth every 8 hours as needed (muscle spasm) 20 tablet 0    diclofenac sodium (VOLTAREN) 1 % GEL Apply 2 g topically 2 times daily To shoulder 150 g 1    lidocaine (LMX) 4 % cream Apply3 times daily to shoudler topically as needed. 1 Tube 2    famotidine (PEPCID) 20 MG tablet Take 20 mg by mouth daily       ondansetron (ZOFRAN-ODT) 4 MG disintegrating tablet Take 1 tablet by mouth 3 times daily as needed for Nausea or Vomiting 60 tablet 1     No facility-administered medications prior to visit. ALLERGIES:    Patient has no known allergies. REVIEW OF SYSTEMS:    Review of Systems    The patient sees PELON Patino CNP as her primary care provider. SPECIALISTS: nephrology for the hypokalemia; bariatric surgeon (Dr. Michael Ahn)    OBJECTIVE DATA     There were no vitals taken for this visit. Physical Exam    Mental Status Evaluation:   Orientation: Alert, oriented, thought content appropriate   Mood:. Anxious, Depressed and Irritable      Affect:  Mood Congruent      Appearance:  Age Appropriate, Casually Dressed, Clean, Well Groomed, Clothing Appropriate for Age and Clothing Appropriate for Weather   Activity:  Cooperative, Good Eye Contact and Seated Calmly   Gait/Posture: Normal   Speech:  Clear, Fluent, Normal Pitch and Volume, Age and Situation Appropriate   Thought Process: Within Normal Limits   Thought Content:   Within Normal Limits   Cognition:  Grossly Intact   Memory: Intact   Insight:  Good   Judgment: Good   Suicidal Ideations: Denies Suicidal Ideation   Homicidal Ideations: Negative for homicidal ideation   Medication Side pulses.   Pulmonary/Chest: Effort normal and breath sounds normal. No stridor. No respiratory distress.   Abdominal: Soft. Bowel sounds are normal. She exhibits no distension. There is no tenderness.   Musculoskeletal: Normal range of motion. She exhibits no edema or tenderness.   Neurological: She is alert and oriented to person, place, and time. No cranial nerve deficit.   Skin: Skin is warm and dry. She is not diaphoretic. No erythema.   L femoral tunneled catheter    Psychiatric: She has a normal mood and affect. Her behavior is normal.         CRANIAL NERVES     CN III, IV, VI   Extraocular motions are normal.        Significant Labs: All pertinent labs within the past 24 hours have been reviewed.    Significant Imaging: I have reviewed all pertinent imaging results/findings within the past 24 hours.   Effects: Absent       Attention Span Attention span and concentration were age appropriate       Screenings Completed in This Encounter:     Anxiety and Depression:                    DIAGNOSIS AND ASSESSMENT DATA     DIAGNOSIS:   1. Moderate episode of recurrent major depressive disorder ([facility] [address]    2. Generalized anxiety disorder      R/O Personality Disorder, Cyclothymia,     PLAN   Follow-up:  Return in about 4 weeks (around 10/27/2021), or if symptoms worsen or fail to improve, for follow-up and medication management. Prescriptions for this encounter:  New Prescriptions    CARIPRAZINE HCL (VRAYLAR) 1.5 MG CAPSULE    Take 1 capsule by mouth daily    L-METHYLFOLATE-ALGAE (DEPLIN 15) 15-90.314 MG CAPS    Take 1 capsule by mouth daily    POTASSIUM CHLORIDE 20 MEQ/15ML (10%) ORAL SOLUTION    Take 15 mLs by mouth daily       Orders Placed This Encounter   Medications    DISCONTD: L-Methylfolate-Algae (DEPLIN 15) 15-90.314 MG CAPS     Sig: Take 1 capsule by mouth daily     Dispense:  90 capsule     Refill:  3    cariprazine hcl (VRAYLAR) 1.5 MG capsule     Sig: Take 1 capsule by mouth daily     Dispense:  30 capsule     Refill:  1    lamoTRIgine (LAMICTAL) 150 MG tablet     Sig: Take 1 tablet by mouth daily     Dispense:  30 tablet     Refill:  1    hydrOXYzine (VISTARIL) 25 MG capsule     Sig: Take 1 capsule by mouth 3 times daily as needed for Anxiety     Dispense:  30 capsule     Refill:  1    busPIRone (BUSPAR) 15 MG tablet     Sig: Take 15 mg by mouth 2 times daily     Dispense:  60 tablet     Refill:  1       Medications Discontinued During This Encounter   Medication Reason    busPIRone (BUSPAR) 15 MG tablet REORDER    lamoTRIgine (LAMICTAL) 150 MG tablet REORDER    hydrOXYzine (VISTARIL) 25 MG capsule REORDER       Additional orders:  No orders of the defined types were placed in this encounter. Patient continues to have significant irritability and mood swings.   She continues to have cycling of moods that are very disruptive. Current treatment regimen has been ineffective and she has not noticed any improvement despite dose adjustments. Treatment options reviewed at length. GeneSight results reviewed. Patient will be started on Vraylar 1.5 mg daily. She will also be started on Deplin. Supportive therapy provided. Differential diagnosis and various treatment options were reviewed at length with patient. Discussed the importance of continuing with individual psychotherapy. Discussed goals of treatment. Patient voiced understanding of all information. Patient is encouraged to utilize nonpharmacologic coping skills such as deep breathing, guided imagery, guided meditation, muscle relaxation, calming music, and/or journaling. Risks, potential side effects, possibledrug-drug interactions, benefits and alternate treatments discussed in detail. All questions answered. Patient stated understanding and is agreeable to treatment plan. Patient has been instructed to seek emergency help via the emergency and/or calling 911 should symptoms become severe, worsen, or with other concerning symptoms. Patient instructed to goimmediately to the emergency room and/or call 911 with any suicidal or homicidal ideations or if audio/visual hallucinations develop  Patient stated understanding and agrees. Patient given crisis center information. Spent 35 min with patient in counseling regarding topics above. Utilized CBT, MI and psychoeducation to address topics. Patient engaged and responsive throughout session. The remainder of session was spent on symptom evaluation and medication management. Provider Signature:  Electronically signed by PELON Sanders CNP on 9/29/2021 at 8:11 AM    **This report has been created using voice recognition software. It may contain minor errors which are inherent in voice recognition technology. **

## 2021-12-03 NOTE — PT/OT/SLP EVAL
Occupational Therapy  Evaluation/Treatment    Jenni Todd   MRN: [id number]   Admitting Diagnosis: Peritoneal catheter dysfunction  S/p insertion of hemodialysis catheter (central venous)    OT Date of Treatment: 19              Billable Minutes:  Evaluation 15  Self Care/Home Management 45    Diagnosis: Peritoneal catheter dysfunction  S/p insertion of hemodialysis catheter (central venous)        Past Medical History:   Diagnosis Date    Abnormal finding on Pap smear, HPV DNA positive     Anemia associated with chronic renal failure     on Epogen    Blood type B+     Bulging discs - symptomatic      CAD (coronary artery disease)     Cardiomyopathy 3/13/2019    Diabetes mellitus, type 2     ESRD (end stage renal disease) 2004    FSGS (focal segmental glomerulosclerosis)     with collapsing    Hyperlipidemia     Hypertension     Neuropathy     NSTEMI (non-ST elevated myocardial infarction) 3/29/2019    Obesity     Secondary hyperparathyroidism, renal     TIA (transient ischemic attack)     Uterine fibroid     small uterine       Past Surgical History:   Procedure Laterality Date    CARDIAC CATHETERIZATION      PCI x 2     SECTION, CLASSIC      COLONOSCOPY N/A 2018    Performed by Rodrigue Russell MD at [facility] ENDO (2ND FLR)    DEBRIDEMENT-WOUND Left 2016    Performed by Teressa Maddox MD at [facility] OR    DIALYSIS FISTULA CREATION      multiple fistulas and grafts before PD     EGD (ESOPHAGOGASTRODUODENOSCOPY) N/A 3/14/2019    Performed by Adolph Davila MD at [facility] ENDO    EGD (ESOPHAGOGASTRODUODENOSCOPY) N/A 3/13/2019    Performed by Adolph Davila MD at [facility] ENDO    INCISION AND DRAINAGE (I&D), LABIAL N/A 2016    Performed by Harmony Fernandez MD at [facility] OR    INCISION AND DRAINAGE (I&D), LABIAL (ADD ON) Left 2016    Performed by Teressa Maddox MD at [facility] OR    INCISION AND DRAINAGE OF WOUND Left     VULVAR ABCESS WITH  no edema,  no murmurs,  regular rate and rhythm , no edema. NECROSIS    Insertion, Catheter, Central Venous, Hemodialysis N/A 3/28/2019    Performed by Kimo Weeks MD at [facility] CATH LAB    Insertion, Catheter, Central Venous, Hemodialysis N/A 3/18/2019    Performed by Kimo Weeks MD at [facility] CATH LAB    INSERTION, CATHETER, TUNNELED ABORTED Left 3/14/2019    Performed by Servando Solis MD at [facility] OR    INSERTION, GRAFT, ARTERIOVENOUS, RIGHT ARM Right 3/22/2019    Performed by BESSIE Wynn III, MD at [facility] OR 2ND FLR    ORIF, HIP Left 9/13/2018    Performed by Tevin Grullon MD at [facility] OR    PERITONEAL CATHETER INSERTION      PERMCATH REWIRE- TUNNELED CATH REWIRE Left 11/13/2017    Performed by Baldev Munroe MD at [facility] CATH LAB    PERMCATH REWIRE- TUNNELED CATH REWIRE N/A 10/5/2017    Performed by Baldev Munroe MD at [facility] CATH LAB    REMOVAL, CATHETER, DIALYSIS, PERITONEAL N/A 3/14/2019    Performed by Servando Solis MD at [facility] OR    UMBILICAL HERNIA REPAIR      Venogram, possible intervention Right 3/20/2019    Performed by BESSIE Wynn III, MD at [facility] CATH LAB         General Precautions: Standard, fall  Orthopedic Precautions: N/A  Braces: N/A          Patient History:  Lives With: (step daughter)  Living Arrangements: house  Home Accessibility: stairs to enter home(1 ИВАН)  Home Layout: Able to live on 1st floor  Stair Railings at Home: none  Financial Concerns: none  Transportation Anticipated: family or friend will provide  Living Environment Comment: Patient lives with stepdaughter but is in college. Patient's dad is retired and lives a mile away from patient. Patient has a walk in shower and bench with grab bars. PAtient has a regular toilet with grab bars. Patient has an electric scooter, w/c, rollator, and RW.     Prior level of function:    Bed Mobility/Transfers: needs device  Grooming: independent  Bathing: needs device  Upper Body Dressing: independent  Lower Body Dressing: independent  Toileting:  independent  Occupation: Full time employment  Type of Occupation: [employer]     Dominant hand: right    Subjective:  Communicated with patient prior to session.    Chief Complaint: weakness and neuropathy in hands and feet  Patient/Family stated goals: to home    Pain/Comfort  Pain Rating 1: 0/10  Pain Rating Post-Intervention 1: 0/10    Objective:        Cognitive Exam:  Oriented to: Person, Place, Time and Situation  Follows Commands/attention: Follows multistep  commands  Communication: clear/fluent  Memory:  No Deficits noted  Safety awareness/insight to disability: intact  Coping skills/emotional control: Appropriate to situation    Visual/perceptual:  Intact    Physical Exam:  Postural examination/scapula alignment:    -       Rounded shoulders  -       Forward head  Skin integrity: Visible skin intact  Edema: Moderate in LEs and in abdomen    Sensation:      -       Impaired  in hands and feet    Upper Extremity Range of Motion:  Right Upper Extremity: limited shoulder flexion  Left Upper Extremity: limited shoulder flexion    Upper Extremity Strength:  Right Upper Extremity: 4/5  Left Upper Extremity: 4/5   Strength: WFL    Fine motor coordination:      -       Impaired due to neuropathy    Gross motor coordination: WFL    Occupational Performance:    Bed Mobility:    · Patient completed Supine to Sit with stand by assistance     Functional Mobility/Transfers:  · Patient completed Sit <> Stand Transfer with minimum assistance  with  hand-held assist   · Patient completed Bed > w/c Transfer using Stand Pivot technique with minimum assistance with hand-held assist  · Patient completed Toilet Transfer Stand Pivot technique with minimum assistance with  hand-held assist and grab bars    Activities of Daily Living:  · Feeding:  independence    · Grooming: S oral care and washing face seated in w/c at sink  · Bathing: moderate assistance spongebath from w/c level at sink  · Upper Body Dressing: stand by  assistance Pringle front gown and donning pullover shirt  · Lower Body Dressing: moderate assistance Pringle socks and donning shoes. Donning pants  · Toileting: minimum assistance      [facility] 6 Click:  [facility] Total Score: 17    Additional Treatment:  Role of OT and POC  Functional mobility training  ADL retraining  Discharge planning    Patient left up in chair with call button in reach and all needs met.     Assessment:  Jenni Todd is a 48 y.o. female with a medical diagnosis of Peritoneal catheter dysfunction S/p insertion of hemodialysis catheter (central venous) and presents with the deficits listed below. Patient will benefit from skilled OT services to achieve maximal independence, ensure safety, and reduce burden of care prior to discharge.    Rehab identified problem list/impairments: weakness, impaired endurance, impaired sensation, impaired self care skills, impaired functional mobilty, gait instability, impaired balance, decreased upper extremity function, decreased lower extremity function, edema    Rehab potential is good    Activity tolerance: Good    Discharge recommendations: home with home health     Barriers to discharge: Decreased caregiver support     Equipment recommendations: commode     GOALS:   Multidisciplinary Problems     Occupational Therapy Goals        Problem: Occupational Therapy Goal    Goal Priority Disciplines Outcome Interventions   Occupational Therapy Goal     OT, PT/OT Ongoing (interventions implemented as appropriate)    Description:  Goals to be met by: 4/17     Patient will increase functional independence with ADLs by performing:    UE Dressing with Modified [place].  LE Dressing with Supervision.  Grooming while standing at sink with Modified [place].  Toileting from bedside commode with Modified [place] for hygiene and clothing management.   Bathing with Supervision.  Supine to sit with Modified [place]/c HOB flat and no handrails.  Stand pivot  transfers with Modified [place].  Toilet transfer to bedside commode with Modified [place].  Upper extremity exercise program 3 x 10 reps per handout, with independence.  Patient will complete a functional standing activity for 10 min with S in order to perform self care tasks.                     PLAN: Patient to be seen 5 x/week to address the above listed problems via self-care/home management, therapeutic activities, therapeutic exercises, community/work re-entry  Plan of Care expires: 05/05/19  Plan of Care reviewed with: patient    SABRA Estes  04/05/2019

## 2022-02-11 NOTE — PLAN OF CARE
"Chief Complaint  Hypertension, Hyperlipidemia, and Anxiety    Subjective          Lindsey Mckinnon presents to [facility]  History of Present Illness  Pt presents today for 3 month follow up.    Pt states she feels like she has some type of UTI/bladder issues.  Pt c/o urgency,itching, burning and lbp. Pt states this started after using antibiotics for covid. Pt states the urine feels \"hot\".    Pt tested positive for covid 1/24.  Pt states she has been nausea lately.    No CP, SOA, HA    Labs 8/21  A1C 5.8      Past Medical History:   Diagnosis Date   • Anemia    • Anxiety    • Arthritis    • ASCVD (arteriosclerotic cardiovascular disease)    • Benign essential hypertension    • Breast lump    • Cervical spine pain    • Chest pain    • Chronic allergic rhinitis    • COPD (chronic obstructive pulmonary disease) ([facility]) 04/15/2021   • Degenerative disc disease, cervical    • Depression    • Esophageal reflux    • Essential hypertension 04/15/2021   • Fibromyalgia    • Forgetfulness    • Gall stones    • GERD (gastroesophageal reflux disease)    • H/O emphysema    • Heart attack ([facility])    • Hemorrhoid    • Hernia cerebri ([facility])    • Hernia, hiatal    • Herniated disc, cervical    • High blood pressure    • High cholesterol    • Hyperlipemia    • Hyperlipidemia 04/15/2021   • Hypertension    • Kidney stone    • Limb swelling    • Lumbar pain    • Migraine    • Mood disorder ([facility])    • Muscle cramps    • Nicotine dependence 04/15/2021   • Reflux esophagitis    • Shortness of breath    • Spinal stenosis    • Tremor 04/15/2021      Family History   Problem Relation Age of Onset   • Lung cancer Mother         MALIGNANT   • Diabetes Mother         UNSPECIFIED TYPE   • Cancer Mother         BLADDER   • Osteoporosis Mother    • Arthritis Mother    • Lung cancer Father         MALIGNANT   • Diabetes Father         UNSPECIFIED TYPE/ MELLITUS   • Heart attack Father    • Kidney cancer " Problem: Adult Inpatient Plan of Care  Goal: Plan of Care Review  Outcome: Ongoing (interventions implemented as appropriate)     04/10/19 0516   Plan of Care Review   Plan of Care Reviewed With patient       Problem: Fall Injury Risk  Goal: Absence of Fall and Fall-Related Injury    Intervention: Promote Injury-Free Environment     04/10/19 0516   Optimize [place] and Functional Mobility   Environmental Safety Modification assistive device/personal items within reach   Optimize Balance and Safe Activity   Safety Promotion/Fall Prevention lighting adjusted;medications reviewed;instructed to call staff for mobility            Father    • Cancer Father         BLADDER   • Arthritis Father    • Heart disease Father    • Diabetes Brother         UNSPECIFIED TYPE/MELLITUS   • Kidney cancer Brother    • Arthritis Brother    • Arthritis Son       Past Surgical History:   Procedure Laterality Date   • CERVICAL EPIDURAL      STERIOD INJECTIONS    •  SECTION      X 2   •  SECTION  ,   • ENDOSCOPY     • GALLBLADDER SURGERY     • HYSTERECTOMY     • HYSTERECTOMY     • LUMBAR EPIDURAL INJECTION      STERIOD        Current Outpatient Medications:   •  albuterol (ACCUNEB) 0.63 MG/3ML nebulizer solution, Take 3 mL by nebulization Every 6 (Six) Hours As Needed for Wheezing., Disp: 60 each, Rfl: 0  •  Fluticasone Furoate-Vilanterol (Breo Ellipta) 200-25 MCG/INH inhaler, Inhale 1 puff Daily., Disp: 1 each, Rfl: 2  •  loratadine (CLARITIN) 10 MG tablet, Take 1 tablet by mouth Daily., Disp: 90 tablet, Rfl: 1  •  losartan (COZAAR) 100 MG tablet, Take 1 tablet by mouth Daily., Disp: 90 tablet, Rfl: 1  •  oxyCODONE ER (OxyCONTIN) 15 MG tablet extended-release 12 hour, Take 15 mg by mouth Every 12 (Twelve) Hours., Disp: , Rfl:   •  pantoprazole (PROTONIX) 40 MG EC tablet, Take 1 tablet by mouth Daily., Disp: 30 tablet, Rfl: 3  •  sertraline (ZOLOFT) 100 MG tablet, Take 1 tablet by mouth Daily., Disp: 30 tablet, Rfl: 3  •  tiotropium (Spiriva HandiHaler) 18 MCG per inhalation capsule, Place 1 capsule into inhaler and inhale Daily., Disp: 30 capsule, Rfl: 5  •  vitamin D (ERGOCALCIFEROL) 1.25 MG (59386 UT) capsule capsule, Take 1 capsule by mouth 1 (One) Time Per Week., Disp: 13 capsule, Rfl: 1  •  fluconazole (Diflucan) 150 MG tablet, Take 1 tablet by mouth 1 (One) Time for 1 dose., Disp: 1 tablet, Rfl: 0  •  nicotine (EQ Nicotine) 7 MG/24HR patch, Place 1 patch on the skin as directed by provider Daily., Disp: 30 each, Rfl: 2  •  nicotine (NICODERM CQ) 14 MG/24HR patch, Place 1 patch on the skin as directed by provider Daily.,  "Disp: 14 each, Rfl: 0  •  nicotine (NICODERM CQ) 21 MG/24HR patch, Place 1 patch on the skin as directed by provider Daily., Disp: 14 each, Rfl: 0  •  nitrofurantoin, macrocrystal-monohydrate, (Macrobid) 100 MG capsule, Take 1 capsule by mouth 2 (Two) Times a Day., Disp: 14 capsule, Rfl: 0  •  tamsulosin (FLOMAX) 0.4 MG capsule 24 hr capsule, Take 1 capsule by mouth Daily for 360 days., Disp: 90 capsule, Rfl: 3    Objective     Vital Signs:     /84 (BP Location: Right arm)   Pulse 89   Ht 165.1 cm (65\")   Wt 103 kg (228 lb)   SpO2 93%   BMI 37.94 kg/m²    Estimated body mass index is 37.94 kg/m² as calculated from the following:    Height as of this encounter: 165.1 cm (65\").    Weight as of this encounter: 103 kg (228 lb).     Wt Readings from Last 3 Encounters:   02/11/22 103 kg (228 lb)   01/24/22 99.8 kg (220 lb)   10/18/21 103 kg (227 lb 3.2 oz)     BP Readings from Last 3 Encounters:   02/11/22 123/84   01/24/22 142/88   10/18/21 117/76     Physical Exam  Vitals and nursing note reviewed.   Constitutional:       Appearance: Normal appearance.   HENT:      Head: Normocephalic and atraumatic.   Cardiovascular:      Rate and Rhythm: Normal rate and regular rhythm.   Pulmonary:      Effort: Pulmonary effort is normal.      Breath sounds: Normal breath sounds.   Musculoskeletal:      Cervical back: Neck supple.   Neurological:      Mental Status: She is alert.   Psychiatric:         Mood and Affect: Mood normal.         Behavior: Behavior normal.        Result Review :     Common labs    Common Labsle 3/2/21 8/12/21 8/12/21 8/12/21 8/12/21     [id number]   Glucose    116 (A)    BUN    9    Creatinine    0.71    eGFR Non African Am    85    Sodium    139    Potassium    4.0    Chloride    106    Calcium    9.1    Albumin    3.80    Total Bilirubin    0.3    Alkaline Phosphatase    75    AST (SGOT)    12    ALT (SGPT)    17    WBC 13.18 (A) 12.05 (A)      Hemoglobin 14.4 14.8      Hematocrit 45.0 " 46.4      Platelets 299 305      Total Cholesterol   212 (A)     Triglycerides   136     HDL Cholesterol   44     LDL Cholesterol    143 (A)     Hemoglobin A1C     5.80 (A)   (A) Abnormal value                          Assessment and Plan      Diagnoses and all orders for this visit:    1. UTI symptoms (Primary)  -     POCT urinalysis dipstick, automated  -     Urine Culture - Urine, Urine, Clean Catch; Future  -     XR Abdomen KUB; Future  -     nitrofurantoin, macrocrystal-monohydrate, (Macrobid) 100 MG capsule; Take 1 capsule by mouth 2 (Two) Times a Day.  Dispense: 14 capsule; Refill: 0  -     fluconazole (Diflucan) 150 MG tablet; Take 1 tablet by mouth 1 (One) Time for 1 dose.  Dispense: 1 tablet; Refill: 0    2. Essential hypertension  Comments:  Stable on coozar 100mg daily  Orders:  -     losartan (COZAAR) 100 MG tablet; Take 1 tablet by mouth Daily.  Dispense: 90 tablet; Refill: 1    3. Gastroesophageal reflux disease with esophagitis without hemorrhage  Comments:  Stable on protonix 40mg daily  Orders:  -     pantoprazole (PROTONIX) 40 MG EC tablet; Take 1 tablet by mouth Daily.  Dispense: 30 tablet; Refill: 3    4. Anxiety  -     sertraline (ZOLOFT) 100 MG tablet; Take 1 tablet by mouth Daily.  Dispense: 30 tablet; Refill: 3    5. Allergic rhinitis, unspecified seasonality, unspecified trigger  -     loratadine (CLARITIN) 10 MG tablet; Take 1 tablet by mouth Daily.  Dispense: 90 tablet; Refill: 1    6. Pure hypercholesterolemia  -     Lipid Panel; Future  -     CBC & Differential; Future  -     Comprehensive Metabolic Panel; Future    7. Vitamin D deficiency  -     Vitamin D 25 Hydroxy; Future  -     vitamin D (ERGOCALCIFEROL) 1.25 MG (34073 UT) capsule capsule; Take 1 capsule by mouth 1 (One) Time Per Week.  Dispense: 13 capsule; Refill: 1    8. Hypertension, unspecified type  -     Lipid Panel; Future  -     CBC & Differential; Future  -     Comprehensive Metabolic Panel; Future    9. Screening for  thyroid disorder  -     TSH; Future  -     T4, Free; Future    10. COPD with exacerbation (HCC)  -     Fluticasone Furoate-Vilanterol (Breo Ellipta) 200-25 MCG/INH inhaler; Inhale 1 puff Daily.  Dispense: 1 each; Refill: 2  -     tiotropium (Spiriva HandiHaler) 18 MCG per inhalation capsule; Place 1 capsule into inhaler and inhale Daily.  Dispense: 30 capsule; Refill: 5    11. Hematuria, unspecified type  -     Urine Culture - Urine, Urine, Clean Catch; Future  -     XR Abdomen KUB; Future    12. Class 2 severe obesity due to excess calories with serious comorbidity and body mass index (BMI) of 37.0 to 37.9 in adult (HCC)  Comments:  Disc diet and exercise  Overview:  Disc diet and exercise      13. Tobacco use  Overview:  Pt cont to smoke and not ready to quit       Follow Up     Return in about 4 months (around 6/11/2022).    Patient was given instructions and counseling regarding her condition or for health maintenance advice. Please see specific information pulled into the AVS if appropriate.     I have reviewed information obtained and documented by others and I have confirmed the accuracy of this documented note.    BULL Langford

## 2022-05-04 NOTE — SUBJECTIVE & OBJECTIVE
Interval History: Continued deterioration necessitating escalation of pressors.     ROS  Objective:     Vital Signs (Most Recent):  Temp: 98.8 °F (37.1 °C) (07/20/19 1500)  Pulse: (!) 119 (07/20/19 1500)  Resp: (!) 33 (07/20/19 1500)  BP: (!) 76/52 (07/20/19 1500)  SpO2: 96 % (07/20/19 1500) Vital Signs (24h Range):  Temp:  [97.7 °F (36.5 °C)-98.8 °F (37.1 °C)] 98.8 °F (37.1 °C)  Pulse:  [] 119  Resp:  [15-33] 33  SpO2:  [90 %-100 %] 96 %  BP: ()/(44-68) 76/52     Weight: 100 kg (220 lb 7.4 oz)  Body mass index is 33.52 kg/m².     SpO2: 96 %  O2 Device (Oxygen Therapy): ventilator      Intake/Output Summary (Last 24 hours) at 7/20/2019 1532  Last data filed at 7/20/2019 1500  Gross per 24 hour   Intake 5693.84 ml   Output 3582 ml   Net 2111.84 ml       Lines/Drains/Airways     Central Venous Catheter Line                 Hemodialysis Catheter 07/11/19 right femoral 9 days         Percutaneous Central Line Insertion/Assessment - triple lumen  07/19/19 1500 left subclavian 1 day          Drain                 Hemodialysis AV Fistula Left upper arm -- days         Hemodialysis AV Graft Right upper arm -- days         NG/OG Tube 07/19/19 0928 nasogastric Right nostril 1 day          Airway                 Airway - Non-Surgical 07/19/19 1030 Endotracheal Tube 1 day          Line                 IABP 07/19/19 1110 8.0 Fr. 40 mL 1 day          Peripheral Intravenous Line                 Peripheral IV - Single Lumen 07/17/19 1203 20 G;1 3/4 in Right;Anterior Forearm 3 days         Peripheral IV - Single Lumen 07/17/19 1500 20 G Anterior;Right Wrist 3 days                Physical Exam   Constitutional: She appears well-developed and well-nourished. She has a sickly appearance. No distress. She is sedated and intubated.   HENT:   Head: Atraumatic.   Eyes: EOM are normal. No scleral icterus.   Neck: Neck supple. No JVD present.   Cardiovascular: Regular rhythm. Tachycardia present.   Pulmonary/Chest: She is  intubated. She has no wheezes. She has no rales.   Left chest central line   Abdominal: Soft. Bowel sounds are normal. She exhibits no distension. There is no tenderness.   Musculoskeletal:   L lateral shin dressing in place, 1-2+ LLE edema.   Skin: Skin is dry. No erythema.       Significant Labs:   ABG:   Recent Labs   Lab 07/20/19  0607 07/20/19  1029 07/20/19  1137   PH 7.263* 7.129* 7.198*   PCO2 33.4* 49.1* 37.0   HCO3 15.1* 16.3* 14.4*   POCSATURATED 94* 57* 94*   BE -12 -13 -14   , CMP   Recent Labs   Lab 07/19/19  0903  07/19/19  2128 07/20/19  0318 07/20/19  1414   *   < > 132* 131*  131* 134*   K 3.9   < > 4.3 4.2  4.2 4.1   CL 91*   < > 92* 90*  90* 91*   CO2 25   < > 13* 14*  14* 14*   *   < > 121* 96  96 89   BUN 39*   < > 40* 37*  37* 12   CREATININE 5.1*   < > 5.1* 4.7*  4.7* 1.9*   CALCIUM 11.4*   < > 11.4* 11.2*  11.2* 9.3   PROT 7.2  --   --   --   --    ALBUMIN 2.2*   < > 1.9* 1.8*  1.8* 1.7*   BILITOT 0.5  --   --   --   --    ALKPHOS 127  --   --   --   --    AST 76*  --   --   --   --    ALT <5*  --   --   --   --    ANIONGAP 18*   < > 27* 27*  27* 29*   ESTGFRAFRICA 10.7*   < > 10.7* 11.8*  11.8* 35.2*   EGFRNONAA 9.3*   < > 9.3* 10.2*  10.2* 30.5*    < > = values in this interval not displayed.   , CBC   Recent Labs   Lab 07/20/19  0318  07/20/19  0746 07/20/19  1125   WBC 19.49*  19.49*  --  20.60* 23.05*   HGB 9.6*  9.6*  --  9.3* 9.1*   HCT 32.3*  32.3*   < > 30.5* 30.4*     183  --  161 150    < > = values in this interval not displayed.   , INR   Recent Labs   Lab 07/19/19  0903   INR 1.2    and Troponin   Recent Labs   Lab 07/19/19  0819 07/19/19  0903 07/19/19 2021   TROPONINI 7.239* 7.014* 10.115*       Significant Imaging: CT scan: CT ABDOMEN PELVIS WITH CONTRAST:   Results for orders placed or performed during the hospital encounter of 07/06/19   CT Abdomen Pelvis With Contrast    Narrative    EXAMINATION:  CT ABDOMEN PELVIS WITH  CONTRAST    CLINICAL HISTORY:  Nausea, vomiting, diarrhea; history renal failure.    Hemodialysis.  Coronary artery disease.  Ejection fraction of 25%.    TECHNIQUE:  Low dose axial images, sagittal and coronal reformations were obtained from the lung bases to the pubic symphysis following the IV administration of 100 mL of Omnipaque 350 without oral contrast    COMPARISON:  06/03/2019 and 03/17/2019 CT    FINDINGS:  Chest: The visualized heart is mildly enlarged.  No pericardial effusion.  There extensive three-vessel coronary atherosclerotic calcification.  There is a large left pleural effusion and small right pleural effusion.  There is compressive atelectasis of the lung bases.  There is a tiny left pneumothorax unchanged from 22:19 chest CT of uncertain etiology.  No interstitial edema.    Abdomen: The liver, the spleen, pancreas and adrenal glands appear normal.  Kidneys show marked atrophy unchanged.  Gallbladder appears normal.  Common bile duct measures about 6 mm probably within normal limits.  The hepatic veins and portal veins and SMV and splenic vein show normal enhancement.    There is a aortic balloon pump in place with gas inflation at time of imaging evident.  This is from left femoral artery approach.  No ureteral dilation or calculus visualized.    Pelvis: The urinary bladder appears normal.  The uterus and adnexal regions are grossly normal.  No inguinal hernia or pelvic adenopathy found.  There is a catheter in the right femoral vein extending to the right atrial lumen.  A nasogastric tube terminates at the stomach fundus.    Bowel and mesentery: There is a large area of anterior abdominal ascites which is partially walled-off appearing similar with 06/03/2019.  Question of this is residual from previous peritoneal dialysis.  There are numerous areas of mesenteric and retroperitoneal calcifications again perhaps chronic changes from previous peritoneal dialysis.    No bowel dilation or pattern  of ileus or obstruction.  There are few small diverticula of the sigmoid colon without acute diverticulitis.  No definite colonic wall thickening.  The terminal ileum is probably axial image 72 and appears normal.  The appendix is not definitely identified.  There is mild fluid in the transverse colon without distension of uncertain significance.  No abscess collection or intraperitoneal free air.    Skeleton: There is 3 cannulated screws of the left femoral neck without acute fracture.  No osteonecrosis.  Sacrum and SI joints are normal for age.  No rib fracture found.  No compression deformity or subluxation of the visualized spine.  No endplate erosion.      Impression    Large left pleural effusion small right pleural effusion with associated compressive atelectasis.  Tiny left pneumothorax potentially related to recent left subclavian catheter placement.  Surveillance chest films may be warranted.    Large quantity of abdominal ascites similar with 06/03/2019 uncertain etiology.    Severe bilateral renal atrophy unchanged.    Aortic balloon pump.  Cardiomegaly and three-vessel coronary disease.    No bowel dilation or acute inflammatory process found.    Findings of the tiny left pneumothorax or called to Katlyn the patient's nurse at 23:07 on date of exam.      Electronically signed by: Pily Tao  Date:    07/19/2019  Time:    23:10    and CT ABDOMEN PELVIS WITHOUT CONTRAST: No results found for this visit on 07/06/19.   alert

## 2022-07-05 NOTE — ASSESSMENT & PLAN NOTE
--nightly PD, last session 9/19 however patient only completed portion of session  --nephrology consulted for management   No

## 2022-09-19 NOTE — PT/OT/SLP PROGRESS
"Physical Therapy  Treatment    Jenni Todd   MRN: [id number]   Admitting Diagnosis: Cardiogenic shock  PT Received On: 04/24/19        Billable Minutes:  Therapeutic Activity 10 and Therapeutic Exercise 20    Treatment Type: Treatment  PT/PTA: PTA     PTA Visit Number: 1       General Precautions: Standard, fall  Orthopedic Precautions: N/A   Braces: N/A    Spiritual, Cultural Beliefs, Mu-ism Practices, Values that Affect Care: no    Subjective:  "my BP low" discussed with nsg low BP today pt cleared for therex defer gait today    Pain/Comfort  Pain Rating 1: ("not pain, discomfort")  Location - Orientation 1: generalized  Location 1: abdomen  Pain Addressed 1: Nurse notified  Pain Rating Post-Intervention 1: (throughout session)    Objective:   Patient found with: (in bed)     AM-PAC 6 CLICK MOBILITY  Total Score:16    Bed Mobility:  Sit>Supine:min A with BLE  Supine>Sit: min A with trunk    Transfers:deferred 2* to low BP    Gait:deferred 2* to low BP    Therex:  2x10 reps A/AA as needed AP,GS,QS,SAQ,HS,abd/add, LAQ,hip flex    Balance: SBA  EOB ~ 10 min LAQ,hip flex    Patient left supine with call button in reach and belongings in reach.    Assessment:  Jenni Todd is a 48 y.o. female with a medical diagnosis of Cardiogenic shock Pt tolerated therex well , however limited session 2* to low BP today,  pt would continue to benefit from skilled PT services to improve overall functional mobility, strength and endurance.  .    Rehab identified problem list/impairments: weakness, impaired endurance, impaired sensation, impaired self care skills, impaired functional mobilty, gait instability, impaired balance, decreased coordination, decreased lower extremity function, impaired cardiopulmonary response to activity    Rehab potential is good.    Activity tolerance: Fair    Discharge recommendations: home health PT     Barriers to discharge: Decreased caregiver support    Equipment recommendations: commode "     GOALS:   Multidisciplinary Problems     Physical Therapy Goals        Problem: Physical Therapy Goal    Goal Priority Disciplines Outcome Goal Variances Interventions   Physical Therapy Goal     PT, PT/OT Ongoing (interventions implemented as appropriate)     Description:  Goals to be met by: 2019 (2 weeks)    Patient will increase functional independence with mobility by performin. Supine to sit with Modified [place]  2. Sit to supine with Modified [place]  3. Sit to stand transfer with Supervision using Rolling Walker or Rollator  4. Bed to chair transfer with Supervision using Rolling Walker or Rollator  5. Gait  x 150 feet with Supervision using Rolling Walker or Rollator  6. Wheelchair propulsion x100 feet with Supervision using bilateral upper extremities  7. Ascend/Descend 4 inch curb step with Stand-by Assistance using Rolling Walker or Rollator  8. Stand for 3 minutes with Stand-by Assistance using Rolling Walker or Rollator while performing an activity  9. Lower extremity exercise program x30 reps per handout, with assistance as needed                        PLAN:    Patient to be seen 5 x/week  to address the above listed problems via gait training, therapeutic activities, therapeutic exercises  Plan of Care expires: 19  Plan of Care reviewed with: patient    Sailaja El, PTA  2019   warm

## 2022-10-12 NOTE — PROGRESS NOTES
[facility]                                  Skilled Nursing Facility                   Progress Note     Admit Date: 4/22/2019  JESSY 5/17/2019  Principal Problem:  Problem with dialysis access   HPI obtained from patient interview and chart review     Chief Complaint: Revaluation of medical treatment and therapy status:  Lab review    HPI: Mrs. Todd is 48 year old female with PMHx of of coronary artery disease, chronic systolic and diastolic heart failure (EF 25%), and ESRF on peritoneal dialysis since 2004 presented to [facility] on 2/21 with abdominal pain. She was initiated on treatment for peritonitis and had removal of peritoneal catheter. She developed active GI bleed and associated encephalopathy. Patient had emergent EGD 3/13 that showed erosive esophagitis with small ulcer and clotted blood in the gastric fundus. The following day she had repeat EGD that found one non-bleeding cratered gastric ulcer with clean ulcer base. She was then transfer to [facility] ICU for vascular surgery assistance in dialysis access placement. She had RUE graft 3/22 placed, but it clotted. She has poor vascular options and vascular surgery recommended that she continue with peritoneal access if possible. She is currently being dialyzed with permacath in left groin placed 3/28. She developed NSTEMI day after groin catheter placement. Cardiology recommended goal directed medical therapy. Patient was transferred here to [facility] on 4/4 for debility. She returned to [facility] ER on 4/6 after her dialysis access did not work. TPA was instilled in her permacath and she was able to undergo a full dialysis session.     Interval Hx: During f/u of care, patient continued to be treated at [facility] with PT and OT to improve functional status and ability to perform ADLs. Today, lab review revealed H&H at 6.9/23.4, initiated iron supplement.  Initiated CBC for   with type and screen. Initiated heparin for hep lock of dialysis port p.r.n. WBC trending down to 11.83 since antibiotic initiation, continue to monitor.    Past Medical History: Patient has a past medical history of Abnormal finding on Pap smear, HPV DNA positive (), Anemia associated with chronic renal failure, Blood type B+, Bulging discs - symptomatic , CAD (coronary artery disease), Cardiomyopathy (3/13/2019), Diabetes mellitus, type 2, ESRD (end stage renal disease) (2004), FSGS (focal segmental glomerulosclerosis), Hyperlipidemia, Hypertension (), Neuropathy, NSTEMI (non-ST elevated myocardial infarction) (3/29/2019), Obesity, Secondary hyperparathyroidism, renal, TIA (transient ischemic attack), and Uterine fibroid.    Past Surgical History: Patient has a past surgical history that includes Peritoneal catheter insertion; Umbilical hernia repair;  section, classic; Dialysis fistula creation; Cardiac catheterization; Incision and drainage of wound (Left, ); Open reduction and internal fixation (ORIF) of injury of hip (Left, 2018); Colonoscopy (N/A, 2018); Esophagogastroduodenoscopy (N/A, 3/13/2019); Esophagogastroduodenoscopy (N/A, 3/14/2019); Peritoneal catheter removal (N/A, 3/14/2019); Insertion of tunneled central venous hemodialysis catheter (N/A, 3/18/2019); Phlebography (Right, 3/20/2019); Placement of arteriovenous graft (Right, 3/22/2019); Insertion of tunneled central venous hemodialysis catheter (N/A, 3/28/2019); Left heart catheterization (Left, 4/15/2019); Insertion of intra-aortic balloon assist device (4/15/2019); and Coronary angiography (N/A, 4/15/2019).    Social History: Patient reports that she has never smoked. She has never used smokeless tobacco. She reports that she does not drink alcohol or use drugs.    Family History: family history includes Cancer in her maternal grandmother and paternal grandfather; Diabetes in her maternal aunt and  paternal aunt.    Allergies: Patient is allergic to clindamycin; flagyl [metronidazole hcl]; and metronidazole.    ROS  Constitutional: Negative for fever and malaise/fatigue.   Eyes: Negative for blurred vision, double vision and discharge.   Respiratory: + intermittent nonproductive for cough. Neg for shortness of breath and wheezing.    Cardiovascular: Negative for chest pain, palpitations, claudication, leg swelling and PND.   Gastrointestinal: Negative for abdominal pain, constipation, diarrhea, nausea and vomiting. + increased abdominal girth  Genitourinary:  Pt is Anuric  Musculoskeletal:  + generalized weakness. Negative for back pain and myalgias.   Skin: Negative for itching and rash.   Neurological: Negative for dizziness, speech change, seizures, and headaches.   Psychiatric/Behavioral: Negative for depression. The patient is not nervous/anxious.      PEx  Temp:  [98.7 °F (37.1 °C)-100.7 °F (38.2 °C)]   Pulse:  [100-115]   Resp:  [17-20]   BP: ()/(39-67)   SpO2:  [95 %-99 %]   Body mass index is 32.91 kg/m².      Constitutional: Patient appears well-developed and in no distress.   HENT:   Head: Normocephalic and atraumatic.   Eyes: Pupils are equal, round, and reactive to light.   Neck: Normal range of motion. Neck supple.   Cardiovascular: Normal rate, regular rhythm and normal heart sounds.    Pulmonary/Chest: Effort normal and breath sounds are clear, nasal congestion  Abdominal: Soft. Bowel sounds are normal.  Musculoskeletal: Normal range of motion. + generalized weakness  Neurological: Alert and oriented to person, place, and time.   Skin: Skin is warm and dry. + Lt femoral HD cath present, intact without any signs of infection, dressing changed today.  Abdominal wounds from previous PD catheter. See details below.   Psychiatric: Normal mood and affect. Behavior is normal.     Assessment and Plan:    ESRD (end stage renal disease) on dialysis  peritonitis due to peritoneal dialysis  catheter  Leukocytosis  -Previously PD patient; admitted 2/21 at [facility] for peritonitis, had PD catheter removed for recurrent peritonitis & transitioned to HD. Abx complete for peritonitis  Bilateral IJ tunneled catheters were attempted without success due to thrombus. Now has a temporary dialysis catheter in her left fem that was placed 3/28.  Prior to permacath placement, she had a next day graft placed by Dr. Leger on 3/22, but it was never used and was clotted 1 day post-op. Patient refused to have the graft declotted, so the permacatch was placed at that time.  -4/25 HD TTS, HD today  -4/30 abd distention is greatly improved with HD treatments.  -5/2  WBC at 12.89, continue to monitor. Bun/Cr at 25/5.8, continue dialysis TTS.  - 5/6 WBC increased to  14.  Patient has been afebrile and does not appear to look ill.  She is having regular bowel movements.  She does have a slight nonproductive intermittent cough.  She is anuric. pts fem line did not have proper dressing. Charge nurse replaced dressing with proper central line dressing. Will repeat CBC tomorrow, 5/7   - Patient went to general surgery appointment last Friday on 05/03.  She will return to their clinic in 2 weeks to see about a PD replacement catheter.  She will need cardiac clearance and for her abdomen wounds to heal.   - 5/13 WBC spike to 20.20 and mild increase in temperature to 99.1, Procalcitonin positive at 1.18. Discharge postponed and infectious workup initiated. Blood cx x 2, Bladder scan, and straight cath for UA with reflex initiated.   - 5/13 CR at 4.8, continue HD TTS.   - 5/15 Initiated heparin, porcine (PF) injection 1,000 Units for hep lock of dialysis port p.r.n. WBC trending down to 11.83 since antibiotic initiation, continue to monitor.    Anemia in chronic kidney disease, on chronic dialysis  -Continue Epo per neph and protonix  -5/2 H&H at 7.6/25.3, continue Epo  - 5/6 H&H improved to 7.8/25   - 5/13 H&H decreased to 7/22.9.    - 5/15 H&H at 6.9/23.4, initiated ferrous sulfate EC tablet 325 mg daily. Initiated CBC and Type & Screen for 05/17. Initiated heparin, porcine (PF) injection 1,000 Units for hep lock of dialysis port p.r.n.     Upper respiratory infection  Rhinitis  Cough  - 5/13 Continue Zyrtec, mucinex  - 5/13 chest x-ray initiated  - 5/14 Review of CXR revealed no acute cardiopulmonary process. Findings improved compared to chest x-ray dated 04/18. T-max 100° F, blood culture obtained from left femoral tunneled catheter. Repeat labs ordered for the a.m.  - 5/15 WBC trending down to 11.83 since antibiotic initiation, continue to monitor.    CONTINUED    Hypomagnesemia  -4/29 initiated mag oxide tab 400 mg daily  -5/2 increased mag oxide to 400 mg bid  - 5/6 improved to 1.6 continue Mag-Ox  - 5/13 increased magnesium tablet 400 mg t.i.d.    Hemorrhoids  -5/2 initiated phenyleph-shark radha oil-mo-pet ointment 1 applicator to be applied to rectum b.i.d.  - 5/6 stable, continues to use medication    high risk for imbalanced nutrition  -5/1 Continued 2000 diabetic diet. Increased fluid restriction to 1500 ml/day. Discontinued renal restriction due to food preference and the need for increase nutrional intake. Goal intake is 85% Of EEN    Debility  - Continue with PT/OT for gait training and strengthening and restoration of ADL's   - Encourage mobility, OOB in chair, and early ambulation as appropriate  - Fall precautions   - Monitor for bowel and bladder dysfunction  - Monitor for and prevent skin breakdown and pressure ulcers  - Continue DVT prophylaxis with heparin   -4/30 Patient is reporting that she does not want to take heparin due to her haveing an acute anemic episode requiring transition to ED in the past. Patient educated on the importance of dvt prophylaxis with decrease mobility, she has decided to continue to refuse heparin.     NSTEMI (non-ST elevated myocardial infarction)  Acute systolic heart failure  -s/p ACS  protocol (DAPT, statin, heparin drip stopped)/NSTEMI   -CT surgery/CABG eval revealed, not a candidate due to comorbidities.   -HTS consult as outpatient to discuss advanced options  -LV EF of 25%  -4/25 Continue HD as outpatient for volume removal  -Continue Asa, statin, and plavix daily  -Discontinued Coreg   -Discontinue irbesartan  -F/u with cards, Ndandu    Pressure injury of sacral region, stage 2  Abdominal wounds  -Wound care consulted  - continue care for abdominal wounds- cleanse with normal saline and gauze pack wound with Aquacel Ag and secure with foam border to change on Monday Wednesday and Friday or as needed if soiled  - continue wound care for pressure injury of sacral area- nursing to cleanse buttocks perineal area b.i.d. and as needed with wipes apply a thin layer of triad paste b.i.d. and as needed.  No diapers.  Turn q.2 hours.       DM II with neuropathy   - Continue Gabapentin 300 mg bid    Future Appointments   Date Time Provider Department Center   5/16/2019  4:00 PM Sung Alonso MD [facility] KRYSTLE Monterroso NP     Saucerization Excision Additional Text (Leave Blank If You Do Not Want): The margin was drawn around the clinically apparent lesion.  Incisions were then made along these lines, in a tangential fashion, to the appropriate tissue plane and the lesion was extirpated.

## 2023-01-31 NOTE — PT/OT/SLP PROGRESS
Physical Therapy      Patient Name:  Jenni Todd   MRN:  [id number]    Attempted to see patient x2 today but patient initially in dialysis in the morning and fatigued and receiving blood during PM attempt . Will follow-up tomorrow.    Jewel Zabala, PT     Ivermectin Counseling:  Patient instructed to take medication on an empty stomach with a full glass of water.  Patient informed of potential adverse effects including but not limited to nausea, diarrhea, dizziness, itching, and swelling of the extremities or lymph nodes.  The patient verbalized understanding of the proper use and possible adverse effects of ivermectin.  All of the patient's questions and concerns were addressed.

## 2023-02-13 NOTE — ASSESSMENT & PLAN NOTE
"3/29 Tn 0.2>1.0  after a tachy followed by hypotensive episode the day after her groin perm-cath was placed.  cards consulted 3/29 "recommend continuing medical therapy with Plavix 75 mg QD and Atorvastatin 40 mg QD; would avoid ASA given recent GIB .  -unable to initiate GDMT for HFrEF given soft BP, will attempt to resume home beta-blocker, ACE-I/ARB and potassium sparing agent when able and ischemic evaluation at future time point"  - blood culture collected in case of infection from recent procedure   - new tte ordered  "
"3/29 Tn 0.2>1.0  after a tachy followed by hypotensive episode the day after her groin perm-cath was placed.  cards consulted 3/29 "recommend continuing medical therapy with Plavix 75 mg QD and Atorvastatin 40 mg QD; would avoid ASA given recent GIB .  -unable to initiate GDMT for HFrEF given soft BP, will attempt to resume home beta-blocker, ACE-I/ARB and potassium sparing agent when able and ischemic evaluation at future time point"  - blood culture collected in case of infection from recent procedure; NGTD     "
"3/29 Tn 0.2>1.0  after a tachy followed by hypotensive episode the day after her groin perm-cath was placed.  cards consulted 3/29 "recommend continuing medical therapy with Plavix 75 mg QD and Atorvastatin 40 mg QD; would avoid ASA given recent GIB .  -unable to initiate GDMT for HFrEF given soft BP, will attempt to resume home beta-blocker, ACE-I/ARB and potassium sparing agent when able and ischemic evaluation at future time point"  - blood culture collected in case of infection from recent procedure; NGTD   - per cards "Please ensure she follows up with her Cardiologist, Dr Calos Gavin on discharge to set up a stress test."        "
- Plan is for cefepime until March 28th, renal dosing  - Wound care consulted for PD site wound care      
- consulted wound care    
- remote stents. Was on DAPT, continuing on Asa (due to bleed plavix held)  - holding BB    
-- chronic and stable; continue with home treatments & monitor     
-NSTEMI, likely due to type 2 demand ischemia in the setting of sepsis/infection, hypotensive episode and impaired clearance of troponin due to ESRD   -serial EKG's reviewed initially sinus tachycardia and later NSR  -TTE from OSH reported LV EF of 30%, bedside limited echo performed by me showed improvement in LV EF and no isolated WMA  -serial troponin estela from 0.2 to 0.9-1 range  -recommend continuing medical therapy with Plavix 75 mg QD and Atorvastatin 40 mg QD; would avoid ASA given recent GIB   -unable to initiate GDMT for HFrEF given soft BP, will attempt to resume home beta-blocker, ACE-I/ARB and potassium sparing agent when able and ischemic evaluation at future time point  -continue to monitor on telemetry  -obtain 2D echo with CFD in the AM   
-To OR today (3/22) for Right arm ACCUSEAL (Early access) AVG placement  -Consent signed and placed in chart  -I have explained the indications, risks, benefits, and alternatives of the procedure in detail. The patient voices understanding and all questions have been answered. The patient agrees to proceed as planned.  
-To OR tomorrow (3/22) for Right arm ACCUSEAL (Early access) AVG placement  -Will sign consent today  
3/13/2019 had GI bleed requiring 2 uPRBC.  no more bleeding, biopsy neg for malignancy or H.pylori  - on PPI      
3/29 Tn 0.2>0.9 after a tachy followed by hypotensive episode.  - repeat ekg, serial troponin  - cards consulted  - blood culture collected in case of infection from recent procedure yesterday    
A1c 6.3, was getting intra-peritoneal insulin. Catheter now removed.   - SSI for now     
A1c 6.3, was getting intra-peritoneal insulin. Catheter now removed.   LDSSI with Q4hr accuchecks until needs established.   
Cefepime given until March 28th (10 days)  - Wound care consulted for PD site wound care      
Chronic systolic heart failure  Hypertension with hypotension  EF 30% on TTE, stable  - Hold antihypertensive due to low BP, resume as able, can be done OP    
Dark vomitus and dark blood/melena per rectum on 3/13.   S/p EGD x 2 which revealed a clean based ulcer in stomach.   Continuing PPI, waiting for H.pylori testing results  
EF 30% on TTE this admission.   Starting on GDMT wtith Coreg 6.25. Will uptitrate and add additional classes as blood pressure tolerates  
ESRD on CCPD admitted to OSH for treatment of peritonitis.  Hospital course complicated by worsening WBC in peritoneal fluid despite appropriate Abx coverage requiring removal of her peritoneal dialysis catheter. Vascular access attempted to multiple occasions at the OSH, but continues to have poor vasculature for long term dialysis access and she was transferred to [facility] [place] vascular surgery evaluation for possible accuseal graft.  Nephrology consulted for ESRD management while in-patient.    Plan/Recommendations:  -HD today for metabolic clearance  -treatment time of 4 hours.  Appears to have had 200 BFR with her CVVHD, so will do 200 BFR with 800 DFR and F200 dialyzer for improved clearance  -will evaluate  Labs in AM, may need more treatment time tomorrow dependent on clearance with therapy tonight.  -recommend continuing abx as outlined by ID at OSH.  
ESRD on CCPD admitted to OSH for treatment of peritonitis.  Hospital course complicated by worsening WBC in peritoneal fluid despite appropriate Abx coverage requiring removal of her peritoneal dialysis catheter. Vascular access attempted to multiple occasions at the OSH, but continues to have poor vasculature for long term dialysis access and [place] [facility] [place] for possible accuseal graft. Accuseal graft completed, but failed due to clotting. Patient refusing declot. Perm cath placed on 3/28.    Plan/Recommendations:  -Patient with 1 hr dialysis overnight which was c/b a new onset of chills/shivering, tachycardia, and hypertension. RR called. Treatment discontinued with improvement of symptoms. Patient now with hypotension.   -Plan to hold dialysis today. No urgent need. Electrolytes and volume status stable.   -Will plan for HD tomorrow for volume management and metabolic clearance  -Recommend collecting blood cultures from central line to r/o any infectious source prompting episode during HD overnight and hypotension today.   -Agree antibiotics   
Hypotension - resolved  Antibiotic-associated diarrhea - resolved, now soft stool - resolved.  Has been C diff negative.    Multiple episodes of hypotension suspecting from recurrent diarrhea, dialysis and maybe PVD  - Fluids prn and loperamide, continue to monitor   - Not on any antihypertensives at this time and HH stable  - Barrier creams and antidiarrheals ordered       
Hypotension - resolved  Antibiotic-associated diarrhea - resolved, now soft stool - resolved.  Has been C diff negative.    Multiple episodes of hypotension suspecting from recurrent diarrhea, dialysis and maybe PVD  - Fluids prn and loperamide, continue to monitor   - Not on any antihypertensives at this time and HH stable       
Hypotension - resolved  Antibiotic-associated diarrhea - resolved, now soft stool - resolved.  Has been C diff negative.    Multiple episodes of hypotension suspecting from recurrent diarrhea, dialysis and maybe PVD  - Fluids prn and loperamide, continue to monitor   - Not on any antihypertensives at this time and HH stable       
Multiple organisms grown since 2/21.   2/21 Rothia sp  3/1 enterobacter and MSSA    Several antibiotic regimens during this hospitalization. Was on Vancomycin and PD Ceftazidine from 2/21 to 3/1. PD oxacillin and IV bactrim from 3/5 to 3/15. Cefepime renal dosing was started on 3/15 after removal of PD catheter.     Followed by ID at [facility]. Recommendation was for Cefepime until 3/28.   
Now on Hemodialysis.  Status post removal of PD catheter for concern regarding mutliple organsims growing in peritoneal fluid. Attempts at tunneled catheter in IJ x 2 during this admission.   Left femoral trialysis placed. S/p OR 3/22 per vascular for RUE graft (Arsen)  - Nephrology following, ESRD mgmt as per nephro  - No IVs in R arm  - RUQ graft is clotted per US.  Surgery wish to fix it as an outpt.  They asked for ALONZO to place permcath   
Now on Hemodialysis.  Status post removal of PD catheter for concern regarding mutliple organsims growing in peritoneal fluid. Attempts at tunneled catheter in IJ x 2 during this admission.   Left femoral trialysis placed. S/p OR 3/22 per vascular for RUE graft (Arsen)  - Nephrology following, ESRD mgmt as per nephro  - No IVs in R arm  - RUQ graft is clotted per US; Surgery wish to fix it as an outpt.  They asked for ALONZO to place permcath.  Surgery does not think pt has good anatomy for vascular acces and wish for her to see Dr. Alonso for new PD placement in clinic.   
Now on Hemodialysis.  Status post removal of PD catheter for concern regarding mutliple organsims growing in peritoneal fluid. Attempts at tunneled catheter in IJ x 2 during this admission.   Left femoral trialysis placed. S/p OR 3/22 per vascular for RUE graft (Arsen)  - Nephrology following, ESRD mgmt as per nephro  - No IVs in R arm  - RUQ may not be working; US ordered by surgery   
Now on Hemodialysis.  Status post removal of PD catheter for concern regarding mutliple organsims growing in peritoneal fluid. Attempts at tunneled catheter in IJ x 2 during this admission.   Left femoral trialysis placed. S/p OR 3/22 per vascular for RUE graft (Arsen)  3/28 groin perm-cath placed  - Nephrology following, ESRD mgmt as per nephro  - No IVs in R arm  - RUQ graft is clotted per US; Surgery wish to fix it as an outpt.  Surgery does not think pt has good anatomy for vascular acces and wish for her to see Dr. Alonso for new PD placement in clinic.   
Now on Hemodialysis.  Status post removal of PD catheter for concern regarding mutliple organsims growing in peritoneal fluid. Attempts at tunneled catheter in IJ x 2 during this admission.   Left femoral trialysis placed. S/p OR 3/22 per vascular for RUE graft (Arsen)  3/28 groin perm-cath placed  - Nephrology following, ESRD mgmt as per nephro  - No IVs in R arm  - RUQ graft is clotted per US; Surgery wish to fix it as an outpt.  Surgery does not think pt has good anatomy for vascular acces and wish for her to see Dr. Alonso for new PD placement in clinic.   
Now on Hemodialysis.  Status post removal of PD catheter for concern regarding mutliple organsims growing in peritoneal fluid. Attempts at tunneled catheter in IJ x 2 during this admission.   Left femoral trialysis placed. S/p OR 3/22 per vascular for RUE graft (Arsen)  3/28 groin perm-cath placed  Pt refuses renal diet; prefers diabetic  - Nephrology following, ESRD mgmt as per nephro  - No IVs in R arm  - RUQ graft is clotted per US; Surgery wish to fix it as an outpt.  Surgery does not think pt has good anatomy for vascular acces and wish for her to see Dr. Alonso for new PD placement in clinic.   
Now on Hemodialysis.  Status post removal of PD catheter for concern regarding mutliple organsims growing in peritoneal fluid. Attempts at tunneled catheter in IJ x 2 during this admission.   Left femoral trialysis placed. S/p OR 3/22 per vascular for RUE graft (Rasen)  3/28 groin perm-cath placed  - Nephrology following, ESRD mgmt as per nephro  - No IVs in R arm  - RUQ graft is clotted per US; Surgery wish to fix it as an outpt.  Surgery does not think pt has good anatomy for vascular acces and wish for her to see Dr. Alonso for new PD placement in clinic.   
On long term DAPT s/p stenting in 2012.    Now with GI bleed  Will continue Asa, hold Plavix  
Patient developed diarrhea since starting antibiotics in hospital.   Also has chronic diarrhea.   C.diff positive 3 years ago. Tested during this admission, negative.   Retesting  As patient is at risk for infection.   If negative, will start anti-diarrheals  
Previously on amlodipine. Will transition to medications with cardiac benefit.   
Status post removal of PD catheter for concern regarding mutliple organsims growing in peritoneal fluid.   Attempts at tunneled catheter in IJ x 2 during this admission. Left femoral trialysis placed.   Vascular Surgery consulted for recommendations regarding access.   Nephrology consulted for continuing dialysis.     Last got dialysis 3/17 through femoral catheter.   
Tentatively plan for Right arm AV graft insertion on Wednesday in OR  
no more bleeding, biopsy neg for malignancy or H.pylori  - on PPI      
remote stents.   - Was on DAPT but held due to GIB 3/13 and need for procedures here. Only ASA restarted.  Will resume plavix until cards consultation 3/29 complete.    - holding BB due to recent low BP    
remote stents.   - Was on DAPT but held due to GIB 3/13 and need for procedures here. Only plavix restarted given recent GIB per cards recs 3/29.    - holding BB due to recent low BP    
Never

## 2023-08-09 NOTE — NURSING
Pt bp 75/50, manual bp with no significant difference. Pt sitting up at bedside -c/o of lightheadness and weakness. On call MD for Med 2 paged, awaiting call back.     On assessment, gauze and tegaderm dressing to R knee saturated with serosanguineous drainage around - MD Brian on call for ortho notified. In room to see pt.    OB/GYN

## 2023-09-26 NOTE — PLAN OF CARE
Problem: Physical Therapy Goal  Goal: Physical Therapy Goal  Goals to be met by: [id number]    Patient will increase functional independence with mobility by performin. Supine to sit with Modified [place] = met 2019  2. Sit to supine with Modified [place] = met 2019  3. Sit to stand transfer with SBA. =Met   4. Bed to chair transfer with SBA using Rolling Walker or Rollator.= met  5. Gait  x 30 feet with SBA using Rolling Walker or Rollator. =Met   6. Wheelchair propulsion x100 feet with Supervision using bilateral upper extremities.= Met   7. Ascend/Descend 4 inch curb step with Stand-by Assistance using Rolling Walker or Rollator = not met  8. Stand for 2 minutes with Stand-by Assistance using Rolling Walker or Rollator =not met  9. Lower extremity exercise program x30 reps per handout, with assistance as needed = met 2019             Goals met as noted. Katarzyna Rangel, PT 2019         Epidermal Sutures: 3-0 Ethilon

## 2023-10-12 NOTE — SUBJECTIVE & OBJECTIVE
Past Medical History:   Diagnosis Date    Abnormal finding on Pap smear, HPV DNA positive     Anemia associated with chronic renal failure     on Epogen    Blood type B+     Bulging discs - symptomatic      CAD (coronary artery disease)     Cardiomyopathy 3/13/2019    Diabetes mellitus, type 2     ESRD (end stage renal disease) 2004    FSGS (focal segmental glomerulosclerosis)     with collapsing    Hyperlipidemia     Hypertension     Neuropathy     NSTEMI (non-ST elevated myocardial infarction) 3/29/2019    Obesity     Secondary hyperparathyroidism, renal     TIA (transient ischemic attack)     Uterine fibroid     small uterine        Past Surgical History:   Procedure Laterality Date    ANGIOGRAM, CORONARY ARTERY N/A 4/15/2019    Performed by Roland Napier MD at [facility] CATH LAB    APPLICATION, EXTERNAL FIXATION DEVICE, LARGE, TIBIA- SYNTHES Right 2019    Performed by Corwin Lopez MD at [facility] OR 2ND FLR    CARDIAC CATHETERIZATION      PCI x 2     SECTION, CLASSIC      COLONOSCOPY N/A 2018    Performed by Rodrigue Russell MD at [facility] ENDO (2ND FLR)    DEBRIDEMENT-WOUND Left 2016    Performed by [facility]ox [facility] at [facility] OR    DIALYSIS FISTULA CREATION      multiple fistulas and grafts before PD     EGD (ESOPHAGOGASTRODUODENOSCOPY) N/A 3/14/2019    Performed by Adolph Davila MD at [facility] ENDO    EGD (ESOPHAGOGASTRODUODENOSCOPY) N/A 3/13/2019    Performed by Adolph Davila MD at [facility] ENDO    INCISION AND DRAINAGE (I&D), LABIAL N/A 2016    Performed by Harmony Fernandez MD at [facility] OR    INCISION AND DRAINAGE (I&D), LABIAL (ADD ON) Left 2016    Performed by Teressa Maddox MD at [facility] OR    INCISION AND DRAINAGE OF WOUND Left     VULVAR ABCESS WITH NECROSIS    Insertion, Catheter, Central Venous, Hemodialysis N/A 3/28/2019    Performed by Kimo Weeks MD at [facility] CATH LAB    Insertion, Catheter, Central Venous,  Hemodialysis N/A 3/18/2019    Performed by Kimo Weeks MD at [facility] CATH LAB    INSERTION, CATHETER, TUNNELED ABORTED Left 3/14/2019    Performed by Servando Solis MD at [facility] OR    INSERTION, GRAFT, ARTERIOVENOUS, RIGHT ARM Right 3/22/2019    Performed by BESSIE Wynn III, MD at [facility] OR 2ND FLR    INSERTION, INTRA-AORTIC BALLOON PUMP  4/15/2019    Performed by Roland Napier MD at [facility] CATH LAB    Left heart cath Left 4/15/2019    Performed by Roland Napier MD at [facility] CATH LAB    ORIF, FRACTURE, TIBIA, PLATEAU Right 7/11/2019    Performed by Dominick Desai MD at [facility] OR 2ND FLR    ORIF, HIP Left 9/13/2018    Performed by Tevin Grullon MD at [facility] OR    PERITONEAL CATHETER INSERTION      PERMCATH REWIRE- TUNNELED CATH REWIRE Left 11/13/2017    Performed by Baldev Munroe MD at [facility] CATH LAB    PERMCATH REWIRE- TUNNELED CATH REWIRE N/A 10/5/2017    Performed by Baldev Munroe MD at [facility] CATH LAB    REMOVAL, CATHETER, DIALYSIS, PERITONEAL N/A 3/14/2019    Performed by Servando Solis [facility] at [facility] OR    REMOVAL, EXTERNAL FIXATION DEVICE Right 7/11/2019    Performed by Dominick Desai MD at [facility] OR 2ND FLR    REPLACEMENT, CATHETER, DIALYSIS, OVER GUIDEWIRE, USING EXISTING VENOUS ACCESS N/A 6/27/2019    Performed by Kimo Weeks MD at [facility] CATH LAB    REPLACEMENT, CATHETER, DIALYSIS, OVER GUIDEWIRE, USING EXISTING VENOUS ACCESS Left 5/24/2019    Performed by Baldev Munroe MD at [facility] CATH LAB    UMBILICAL HERNIA REPAIR      Venogram, possible intervention Right 3/20/2019    Performed by BESSIE Wynn III, MD at [facility] CATH LAB       Review of patient's allergies indicates:   Allergen Reactions    Flagyl [metronidazole hcl] Nausea And Vomiting    Clindamycin Diarrhea       Family History     Problem Relation (Age of Onset)    Cancer Maternal Grandmother, Paternal Grandfather    Diabetes Maternal Aunt, Paternal Aunt        Tobacco Use    Smoking status: Never  Smoker    Smokeless tobacco: Never Used   Substance and Sexual Activity    Alcohol use: No     Comment: Pt reports some social use of about 1-2 drinks about every six months.    Drug use: No    Sexual activity: Not Currently     Partners: Male     Birth control/protection: None      Review of Systems   Unable to perform ROS: Acuity of condition     Objective:     Vital Signs (Most Recent):  Temp: 98.9 °F (37.2 °C) (07/19/19 0853)  Pulse: (!) 123 (07/19/19 0853)  Resp: (!) 34 (07/19/19 0853)  BP: (!) 70/47 (07/19/19 0853)  SpO2: 99 % (07/19/19 0820) Vital Signs (24h Range):  Temp:  [97.6 °F (36.4 °C)-98.9 °F (37.2 °C)] 98.9 °F (37.2 °C)  Pulse:  [] 123  Resp:  [16-34] 34  SpO2:  [94 %-100 %] 99 %  BP: ()/(33-66) 70/47   Weight: 90.3 kg (199 lb 1.2 oz)  Body mass index is 31.18 kg/m².      Intake/Output Summary (Last 24 hours) at 7/19/2019 0915  Last data filed at 7/19/2019 0600  Gross per 24 hour   Intake 1365.2 ml   Output --   Net 1365.2 ml       Physical Exam   Constitutional: She appears well-developed. She appears lethargic. She is cooperative. She appears distressed.   HENT:   Head: Normocephalic.   Eyes: Pupils are equal, round, and reactive to light.   Cardiovascular: Intact distal pulses.  Occasional extrasystoles are present. Tachycardia present.   Chest pain that feels like someone hit me in the chest   Pulmonary/Chest: Tachypnea noted. She has decreased breath sounds in the right lower field and the left lower field.   Abdominal: She exhibits distension. There is tenderness.   Neurological: She appears lethargic.   Skin: Skin is warm and dry. Lesion noted.        Dressings on Right leg -see media   Nursing note and vitals reviewed.      Vents:     Lines/Drains/Airways     Central Venous Catheter Line                 Hemodialysis Catheter right femoral -- days         Percutaneous Central Line Insertion/Assessment - double lumen  06/27/19 1438 right femoral vein;right femoral 21 days           Drain                 Hemodialysis AV Fistula Left upper arm -- days         Hemodialysis AV Graft Right upper arm -- days          Peripheral Intravenous Line                 Peripheral IV - Single Lumen 07/06/19 0823 22 G Right Wrist 13 days         Peripheral IV - Double Lumen 07/11/19 1013 20 G Distal;Left;Posterior Forearm 7 days         Peripheral IV - Single Lumen 07/17/19 1203 20 G;1 3/4 in Right;Anterior Forearm 1 day              Significant Labs:    CBC/Anemia Profile:  Recent Labs   Lab 07/17/19  1836 07/18/19  0509 07/19/19  0819   WBC 19.26* 19.17* 16.64*   HGB 8.6* 9.0* 9.0*   HCT 28.1* 28.6* 29.8*    230 232   * 101* 102*   RDW 16.5* 16.9* 17.1*        Chemistries:  Recent Labs   Lab 07/18/19  0509 07/19/19  0819   *  --    K 3.7  --    CL 91*  --    CO2 19*  --    BUN 31*  --    CREATININE 4.5*  --    CALCIUM 11.2*  --    ALBUMIN 2.2*  --    MG 2.2 2.3   PHOS 3.9  --        All pertinent labs within the past 24 hours have been reviewed.    Significant Imaging: I have reviewed and interpreted all pertinent imaging results/findings within the past 24 hours.   c/o palpitations, dizziness, diaphoresis with activity and relieved with rest x 2 months. hx of HTN, DM. denies CP, SOB, blurry vision. ekg in progress.

## 2023-10-19 NOTE — PLAN OF CARE
From: Kelly Cohen  To: Jeny Watson  Sent: 10/13/2023 9:48 AM CDT  Subject: Mammogram     I had my mammogram yesterday without my previous films from my last mammogram which was 2020     I finally after several phone calls was able to obtain that film from my last mammogram.     How do I get it yo you?    Problem: Physical Therapy Goal  Goal: Physical Therapy Goal  Goals to be met by 18.    Patient will increase functional independence with mobility by performin. Suine<>sit min A  2. Sit<>stand with RW and CGA  3. Gait > 50' with RW and CGA with L LE TTWB  4. Up/down curb step with RW with L TTWB and min A.    -    Comments: Good participation in therapy pt sit<>stand with CGA.

## 2023-10-23 NOTE — ED PROVIDER NOTES
"Encounter Date: 9/19/2018    SCRIBE #1 NOTE: I, Garfield Peters, am scribing for, and in the presence of,  Dr. Reyes. I have scribed the following portions of the note - the Resident attestation.       History     Chief Complaint   Patient presents with    Rectal Bleeding     via EMS from [facility] (broken Left hip) rectal bleeding started 45min ago, 1 large episode noted per EMS; retroperitoneal dialysis patient on heparin; initial BP 70s/40s: AAOX4     47 yo F w/ hx of ESRD on PD, cerebellar CVA, s/p hip repair around a week prior presents with acute GI bleed. The bleeding started around 21:00 on 9/19/18 when the patient was attempting to defecate. When moved her bowels, copious amount of bright red blood and clots passed into the toilet bowl.  She is on ASA, heparin, and clopidogrel. Endorses cramping abdominal discomfort, denies sharp pains. The sensation is described as "when you have to go to the bathroom". Denies nausea, vomiting, abd pain. Endorses minimal blood loss via rectum a few days ago; she denies any similar bleeding events. Recently transfused 1 u prc postoperatively. Has never received a colonoscopy. Denies alcohol use.           Review of patient's allergies indicates:   Allergen Reactions    Clindamycin Diarrhea    Flagyl [metronidazole hcl]      Past Medical History:   Diagnosis Date    Abnormal finding on Pap smear, HPV DNA positive 2014    Anemia associated with chronic renal failure     on Epogen    Blood type B+     Bulging discs - symptomatic      CAD (coronary artery disease)     Diabetes mellitus, type 2     ESRD (end stage renal disease) 04/20/2004    FSGS (focal segmental glomerulosclerosis)     with collapsing    Hyperlipidemia     Hypertension 2004    Neuropathy     Obesity     Secondary hyperparathyroidism, renal     TIA (transient ischemic attack)     Uterine fibroid     small uterine      Past Surgical History:   Procedure Laterality Date    CARDIAC " 10/23/2023       RE: Lin Corona  [id number] [address]  [place] [address]     Dear Colleague,    Thank you for referring your patient, Lin Corona, to the [facility] [place] at [facility]. Please see a copy of my visit note below.    Return Medical Weight Management Note     Lin Corona  MRN:  [id number]  :  1981  ROSE MARIE:  10/23/2023    Dear Dahlia Johns, TRUMAN CNP,    I had the pleasure of seeing your patient Lin Corona. She is a 42 year old female who I am continuing to see for treatment of obesity related to:        2023    12:26 PM   --   I have the following health issues associated with obesity None of the above   I have the following symptoms associated with obesity Depression       Assessment & Plan  Problem List Items Addressed This Visit    None  Visit Diagnoses       Class 2 severe obesity with serious comorbidity and body mass index (BMI) of 36.0 to 36.9 in adult, unspecified obesity type (H)    -  Primary    Relevant Medications    Phentermine-Topiramate 7.5-46 MG CP24           Weight mgmt follow up  Difficulty starting GLP1 due to supply issues  Discussed starting qsymia 7.5/46  Plans to try health  visits    Follow up Kristie 3-4 mo return MW      INTERVAL HISTORY:    Was planning to start wegovy but none available so plan to start saxenda.    Tried to switch to Saxenda but now isisues with saxenda     TSH high, just started levothyroxine     CURRENT WEIGHT:   206 lbs 0 oz    Initial Weight (lbs): 203.5 lbs  Last Visits Weight: 94.3 kg (208 lb)  Cumulative weight loss (lbs): -2.5  Weight Loss Percentage: -1.23%        10/23/2023    11:19 AM   Changes and Difficulties   I have made the following changes to my diet since my last visit: More water.  Less fruit   With regards to my diet, I am still struggling with: Weight loss   I have made the following changes to my activity/exercise  CATHETERIZATION      PCI x 2     SECTION, CLASSIC      DEBRIDEMENT-WOUND Left 2016    Performed by [facility]ressa Maddox [facility] at [facility] OR    DIALYSIS FISTULA CREATION      multiple fistulas and grafts before PD     INCISION AND DRAINAGE (I&D), LABIAL N/A 2016    Performed by Harmony Fernandez MD at [facility] OR    INCISION AND DRAINAGE (I&D), LABIAL (ADD ON) Left 2016    Performed by [facility]ressa Maddox MD at [facility] OR    INCISION AND DRAINAGE OF WOUND Left 2016    VULVAR ABCESS WITH NECROSIS    OPEN REDUCTION AND INTERNAL FIXATION (ORIF) OF INJURY OF HIP Left 2018    Procedure: ORIF, HIP;  Surgeon: Tevin Grullon MD;  Location: [facility] OR;  Service: Orthopedics;  Laterality: Left;    ORIF, HIP Left 2018    Performed by [facility]evin Grullon [facility] at [facility] OR    PERITONEAL CATHETER INSERTION      PERMCATH REWIRE- TUNNELED CATH REWIRE Left 2017    Performed by Baldev Munroe MD at [facility] CATH LAB    PERMCATH REWIRE- TUNNELED CATH REWIRE N/A 10/5/2017    Performed by Baldev Munroe MD at [facility] CATH LAB    UMBILICAL HERNIA REPAIR       Family History   Problem Relation Age of Onset    Cancer Maternal Grandmother     Cancer Paternal Grandfather     Diabetes Maternal Aunt     Diabetes Paternal Aunt     Kidney disease Neg Hx     Heart disease Neg Hx     Ovarian cancer Neg Hx     Breast cancer Neg Hx      Social History     Tobacco Use    Smoking status: Never Smoker    Smokeless tobacco: Never Used   Substance Use Topics    Alcohol use: No     Comment: Pt reports some social use of about 1-2 drinks about every six months.    Drug use: No     Review of Systems   Constitutional: Negative for chills, fatigue and fever.   HENT: Negative for sore throat.    Eyes: Negative for visual disturbance.   Respiratory: Negative for cough and shortness of breath.    Cardiovascular: Negative for chest pain, palpitations and leg swelling.   Gastrointestinal: Positive for abdominal pain (cramping),  since my last visit: Walking 20 minutes at least 2x a week   With regards to my activity/exercise, I am still struggling with: Time         MEDICATIONS:   Current Outpatient Medications   Medication Sig Dispense Refill    Phentermine-Topiramate 7.5-46 MG CP24 Take 1 capsule by mouth daily 30 capsule 3    dupilumab (DUPIXENT) 300 MG/2ML prefilled pen Inject 2 mLs (300 mg) Subcutaneous every 14 days 4 mL 11    insulin pen needle (32G X 6 MM) 32G X 6 MM miscellaneous Use 1 pen needles daily or as directed with Saxenda. 100 each 1    levothyroxine (SYNTHROID/LEVOTHROID) 50 MCG tablet Take 1 tablet (50 mcg) by mouth daily 90 tablet 1    liraglutide - Weight Management (SAXENDA) 18 MG/3ML pen Inject subcutaneously. Week 1: 0.6 mg daily, week 2: 1.2 mg daily, week 3: 1.8 mg daily, week 4: 2.4 mg daily, week 5 & on: 3 mg daily 15 mL 2    tiZANidine (ZANAFLEX) 2 MG tablet Take 1 tablet (2 mg) by mouth 3 times daily as needed for muscle spasms 20 tablet 0           10/23/2023    11:19 AM   Weight Loss Medication History Reviewed With Patient   If you are not taking a weight loss medication that was prescribed to you, please indicate why: Other   Are you having any side effects from the weight loss medication that we have prescribed you? No       Lab on 10/03/2023   Component Date Value Ref Range Status    CRP Inflammation 10/03/2023 7.04 (H)  <5.00 mg/L Final    WBC Count 10/03/2023 7.4  4.0 - 11.0 10e3/uL Final    RBC Count 10/03/2023 4.31  3.80 - 5.20 10e6/uL Final    Hemoglobin 10/03/2023 13.2  11.7 - 15.7 g/dL Final    Hematocrit 10/03/2023 39.5  35.0 - 47.0 % Final    MCV 10/03/2023 92  78 - 100 fL Final    MCH 10/03/2023 30.6  26.5 - 33.0 pg Final    MCHC 10/03/2023 33.4  31.5 - 36.5 g/dL Final    RDW 10/03/2023 12.1  10.0 - 15.0 % Final    Platelet Count 10/03/2023 238  150 - 450 10e3/uL Final    % Neutrophils 10/03/2023 56  % Final    % Lymphocytes 10/03/2023 35  % Final    % Monocytes 10/03/2023 7  % Final    %  "Eosinophils 10/03/2023 2  % Final    % Basophils 10/03/2023 0  % Final    % Immature Granulocytes 10/03/2023 0  % Final    Absolute Neutrophils 10/03/2023 4.1  1.6 - 8.3 10e3/uL Final    Absolute Lymphocytes 10/03/2023 2.6  0.8 - 5.3 10e3/uL Final    Absolute Monocytes 10/03/2023 0.5  0.0 - 1.3 10e3/uL Final    Absolute Eosinophils 10/03/2023 0.1  0.0 - 0.7 10e3/uL Final    Absolute Basophils 10/03/2023 0.0  0.0 - 0.2 10e3/uL Final    Absolute Immature Granulocytes 10/03/2023 0.0  <=0.4 10e3/uL Final     PHYSICAL EXAM:  Objective   Ht 1.6 m (5' 3\")   Wt 93.4 kg (206 lb)   LMP 01/06/2021   BMI 36.49 kg/m      Vitals - Patient Reported  Pain Score: No Pain (0)        GENERAL: Healthy, alert and no distress  EYES: Eyes grossly normal to inspection.  No discharge or erythema, or obvious scleral/conjunctival abnormalities.  RESP: No audible wheeze, cough, or visible cyanosis.  No visible retractions or increased work of breathing.    SKIN: Visible skin clear. No significant rash, abnormal pigmentation or lesions.  NEURO: Cranial nerves grossly intact.  Mentation and speech appropriate for age.  PSYCH: Mentation appears normal, affect normal/bright, judgement and insight intact, normal speech and appearance well-groomed.        Sincerely,    Kristie Olivares PA-C      20 minutes spent by me on the date of the encounter doing chart review, history and exam, documentation and further activities per the note      Virtual Visit Details    Type of service:  Video Visit     Originating Location (pt. Location): Home    Distant Location (provider location):  Off-site  Platform used for Video Visit: Christy" anal bleeding and blood in stool. Negative for abdominal distention, constipation, diarrhea, nausea and vomiting.   Genitourinary: Negative for pelvic pain.   Neurological: Negative for dizziness, seizures and light-headedness.   Hematological: Does not bruise/bleed easily.   Psychiatric/Behavioral: Negative for agitation and confusion.       Physical Exam     Initial Vitals   BP Pulse Resp Temp SpO2   09/19/18 2208 09/19/18 2208 09/19/18 2208 09/19/18 2214 09/19/18 2208   103/67 97 18 98.2 °F (36.8 °C) 99 %      MAP       --                Physical Exam    Constitutional: She appears well-developed and well-nourished. She is not diaphoretic. No distress.   HENT:   Head: Normocephalic and atraumatic.   Eyes: Scleral icterus (mild) is present.   Pale conjunctiva   Cardiovascular: Normal rate, regular rhythm and normal heart sounds.   Pulmonary/Chest: No respiratory distress.   Abdominal: Soft. She exhibits no distension and no mass. There is no tenderness. There is no rebound and no guarding.   protuberant    Genitourinary:   Genitourinary Comments: Rectal tone intact, no external hemmoroids, sada blood visible dripping from anus, FOBT+   Lymphadenopathy:     She has no cervical adenopathy.   Neurological: She is alert and oriented to person, place, and time. GCS score is 15. GCS eye subscore is 4. GCS verbal subscore is 5. GCS motor subscore is 6.   Skin: Skin is warm and dry.   Psychiatric: She has a normal mood and affect. Thought content normal.         ED Course   Procedures  Labs Reviewed   CBC W/ AUTO DIFFERENTIAL - Abnormal; Notable for the following components:       Result Value    RBC 1.89 (*)     Hemoglobin 6.3 (*)     Hematocrit 18.7 (*)     MCV 99 (*)     MCH 33.3 (*)     RDW 15.4 (*)     Platelets 382 (*)     Immature Grans (Abs) 0.05 (*)     Platelet Estimate Increased (*)     All other components within normal limits    Narrative:     HCT critical result(s) called and verbal readback obtained from    ARMAND HULL RN, 09/19/2018 23:40   COMPREHENSIVE METABOLIC PANEL - Abnormal; Notable for the following components:    Sodium 131 (*)     Chloride 90 (*)     CO2 22 (*)     Glucose 270 (*)     BUN, Bld 65 (*)     Creatinine 11.1 (*)     Calcium 8.2 (*)     Albumin 2.2 (*)     Alkaline Phosphatase 141 (*)     ALT <5 (*)     Anion Gap 19 (*)     eGFR if  4.2 (*)     eGFR if non  3.6 (*)     All other components within normal limits   PROTIME-INR   APTT   ANTI-XA HEPARIN MONITORING   APTT    Narrative:     add on per Dr Horton order #[id number] 09/20/2018  01:35 APTT   HEPARIN-INDUCED PLATELET ANTIBODY   TYPE & SCREEN   PREPARE RBC SOFT     EKG Readings: (Independently Interpreted)   Initial Reading: No STEMI. Rhythm: Normal Sinus Rhythm. Ectopy: No Ectopy. Axis: Normal.       Imaging Results    None          Medical Decision Making:   History:   Old Medical Records: I decided to obtain old medical records.  Initial Assessment:   47 yo F with ESRD on ASA, heparin, and clopidogrel presents with acute onset bright red blood per rectum and moderate hypotension.   Differential Diagnosis:   DDx includes, but not limited to, internal hemmorhoid, AVM, diverticular bleed, upper GI bleed  Clinical Tests:   Lab Tests: Reviewed and Ordered  Medical Tests: Reviewed and Ordered  ED Management:  CBC, CMP, pantoprazole 80 mg IV once, 2 large bore IVs inserted, type and screen, INR, consults to colorectal surgery and gastroenterology.    11:56 PM  Per colorectal surgery, bleeding unlikely from internal hemorrhoid based on anoscopy. Systolic blood pressure to 80, 1 L NS bolused. 2 unit prbc ordered due to critical hemoglobin level in the setting of a brisk bleed. Patient denies lightheadedness. \    12:20 AM  Ordered CTA abdomen/pelvis  Critical care consult placed; admitted to ICU    1:11 AM  Protamine 50 mg IV  Hemodynamically stable,   Other:   I have discussed this case with another health  care provider.            Zachery Attestation:   Scribe #1: I performed the above scribed service and the documentation accurately describes the services I performed. I attest to the accuracy of the note.    Attending Attestation:   Physician Attestation Statement for Resident:  As the supervising MD   Physician Attestation Statement: I have personally seen and examined this patient.   I agree with the above history. -:   As the supervising MD I agree with the above PE.    As the supervising MD I agree with the above treatment, course, plan, and disposition.   -: 48-year-old female with end-stage renal disease on daily PD, history of CVA, and recent ORIF of right hip here today with bright red blood per rectum.  Has been occurring for past few hours.  On clopidogrel, aspirin, and heparin.  Currently living in rehab facility.  Initially vitals were normal. Patient had a hypotensive episode in emergency department which warranted more urgent care.  2 units packed red blood cells given as H&H was 6.3 and 18.7.  CMP consistent with end-stage renal disease.  Decision made to give protamine due to massive GI bleed.  Colorectal surgery consulted for anoscopy.  States there was no hemorrhoidal bleeding.  They recommended CTA of the abdomen, which was ordered.  Discussed with MICU who admitted to the patient prior to obtaining CTA.  Also discussed with GI for urgent colonoscopy.        Attending Critical Care:   Critical Care Times:   Direct Patient Care (initial evaluation, reassessments, and time considering the case)................................................................15 minutes.   Ordering, Reviewing, and Interpreting Diagnostic Studies...............................................................................................................5 minutes.    Documentation..................................................................................................................................................................................10 minutes.   Consultation with other Physicians. .................................................................................................................................................10 minutes.   ==============================================================  · Total Critical Care Time - exclusive of procedural time: 40 minutes.  ==============================================================  Critical care was necessary to treat or prevent imminent or life-threatening deterioration of the following conditions: GI bleeding.   Critical care was time spent personally by me on the following activities: examination of patient, ordering lab, x-rays, and/or EKG, development of treatment plan with patient or relative, ordering and performing treatments and interventions, evaluation of patient's response to treatment, discussion with consultants and re-evaluation of patient's conition.   Critical Care Condition: potentially life-threatening                  Clinical Impression:   The primary encounter diagnosis was Acute GI bleeding. A diagnosis of Acute lower GI bleeding was also pertinent to this visit.      Disposition:   Disposition: Admitted  Condition: Serious                        Remberto London Ernesto Evans MD  Resident  09/20/18 0207       Zach Reyes MD  09/20/18 0318

## 2024-01-03 NOTE — PROGRESS NOTES
"[facility]  [facility]ult [facility]  Progress Note    SUMMARY       Recommendations    Recommendation/Intervention:     1. Continue Diabetic diet.   2. Encourage po intake.   3. RD following.     Goals: meet >85% EEN/EPN  Nutrition Goal Status: new  Communication of RD Recs: (POC)    Reason for Assessment    Reason For Assessment: RD follow-up  Diagnosis: (ESRD on PD)  Relevant Medical History: ESRD on PD, HTN, HLD, obesity, CAD, DM, CHF  General Information Comments: Pt last seen by RD on 3/15/19. Pt reports normal appetite. Eating ~50% of meals. States she is not a big eater. Does not want Novasource Renal 2/2 causes diarrhea. Denies N/V/D/C at this time. UBW unknown, but reports wt loss. Noted wt fluctuation likely 2/2 PD. Pt appears with no phsyical signs of malnutrition at this time. Noted stage 2 pressure injury. Pt at risk for malnutrition 2/2 decreased po intake and wt loss.  Nutrition Discharge Planning: adequate po intake    Nutrition Risk Screen    Nutrition Risk Screen: no indicators present    Nutrition/Diet History    Spiritual, Cultural Beliefs, Muslim Practices, Values that Affect Care: no  Factors Affecting Nutritional Intake: None identified at this time    Anthropometrics    Temp: 98 °F (36.7 °C)  Height Method: Stated  Height: 5' 7" (170.2 cm)  Height (inches): 67 in  Weight Method: Bed Scale  Weight: 93.1 kg (205 lb 4 oz)  Weight (lb): 205.25 lb  Ideal Body Weight (IBW), Female: 135 lb  % Ideal Body Weight, Female (lb): 152.04 lb  BMI (Calculated): 32.2  BMI Grade: 30 - 34.9- obesity - grade I  Weight Loss: unintentional  Usual Body Weight (UBW), k.1 kg(per chart review 18)  % Usual Body Weight: 91.38  % Weight Change From Usual Weight: -8.82 %     Lab/Procedures/Meds    Pertinent Labs Reviewed: reviewed  Pertinent Labs Comments: Na 134, BUN 46, Cr 10.6, GFR 4.4, glucose 126, phos 6.5, ALT 7  Pertinent Medications Reviewed: reviewed  Pertinent Medications Comments: " -- DO NOT REPLY / DO NOT REPLY ALL --  -- Message is from Engagement Center Operations (ECO) --    Order Request  Lab: Blood work     Message / reason: 3 month check up before 01/29/24    Insurance type: HMO   Payor:  [employer] / Plan:  BE [id number] / Product Type:  HM    Preferred Delivery Method   Call when ready for pickup - phone number to notify: [phone] and Input in Epic     Caller Information         Type Contact Phone/Fax    01/03/2024 08:06 AM CST Phone (Incoming) Mann Coronel (Self) [phone] (M)            Alternative phone number: No    Can a detailed message be left? Yes    Message Turnaround:        insulin, pantoprazole, sevelamer carbonate    Estimated/Assessed Needs    Weight Used For Calorie Calculations: 93.1 kg (205 lb 4 oz)  Energy Calorie Requirements (kcal): 1985 kcal/day  Energy Need Method: Kcal/kg(x 1.25)  Protein Requirements: 112-121 gm/day(1.2-1.3 gm/kg)  Weight Used For Protein Calculations: 93.1 kg (205 lb 4 oz)  Fluid Requirements (mL): 1 mL/kcal or per MD     RDA Method (mL): 1985  CHO Requirement: 50% kcal from CHO    Nutrition Prescription Ordered    Current Diet Order: Diabetic    Evaluation of Received Nutrient/Fluid Intake    Comments: LBM 3/26  Tolerance: tolerating  % Intake of Estimated Energy Needs: 25 - 50 %  % Meal Intake: 25 - 50 %    Nutrition Risk    Level of Risk/Frequency of Follow-up: high(f/u 2 x wk)     Assessment and Plan     Nutrition Problem  Inadequate Energy Intake    Related to (etiology):   Inadequate Oral Intake     Signs and Symptoms (as evidenced by):   Pt eating <50% of meals.      Interventions:  Collaboration and Referral of Nutrition Therapy     Nutrition Diagnosis Status:   New    Monitor and Evaluation    Food and Nutrient Intake: energy intake, food and beverage intake  Food and Nutrient Adminstration: diet order  Physical Activity and Function: nutrition-related ADLs and IADLs  Anthropometric Measurements: weight, weight change, body mass index  Biochemical Data, Medical Tests and Procedures: electrolyte and renal panel, gastrointestinal profile, glucose/endocrine profile, inflammatory profile, lipid profile  Nutrition-Focused Physical Findings: overall appearance     Nutrition Follow-Up    RD Follow-up?: Yes

## 2025-01-14 NOTE — NURSING
"0715:  Pt sitting up bedside at shift change, pleasant conversation with RNs, states "improved nausea" from night interventions, still holding emisis basin, requesting phenergan when available from pharmacy, denies any pain. No s/s of distress noted,  on telemetry monitoring.    0800: pt making grunting, gagging noises, requesting help with feet into bed, laying on side also stating she feels "need to have a BM", bedpan brought, pt passed gas, then reporting SOB, "can't breath."  RN checked , VS: BP 70/50s, HR >120, tachypnic, and co "pain everywhere."      0820: RN requesting help from CC RN and charge nurse, pt now BP in 50s/40s, HR .120s, SaO2 difficult to measure due to cold hands, >90% when read on 2LNC.  Co pressure and chest pain. MD team paged and RR initiated.    Pt transferred to ICU .     " [phone]

## 2025-07-11 NOTE — NURSING
Health Maintenance       HPV Vaccine (2 - 3-dose series)  Overdue since 12/31/2014    COVID-19 Vaccine (3 - 2024-25 season)  Overdue since 9/1/2024           Following review of the above:  Patient is not proceeding with: COVID-19 and HPV    Note: Refer to final orders and clinician documentation.       Patient arrived back on unit via wheelchair. Patient hypotensive BP 80/52 Midodrine 10 mg given early per MD orders. Will recheck BP soon.

## (undated) DEVICE — GUIDEWIRE LAUREATE 035IN 180CM

## (undated) DEVICE — CLOSURE SKIN STERI STRIP 1/2X4

## (undated) DEVICE — SUT MONOCRYL 3-0 SH U/D

## (undated) DEVICE — CATH 6FR JR4.0

## (undated) DEVICE — SUT ETHILON 3-0 FS-1 30

## (undated) DEVICE — DRAPE STERI-DRAPE 83X125 IOBAN

## (undated) DEVICE — TRAY PICC CENTRAL LINE DRSNG

## (undated) DEVICE — SUT VICRYL PLUS 2-0

## (undated) DEVICE — KIT CATH INSERT LINEAR 7.5F

## (undated) DEVICE — Device

## (undated) DEVICE — SEE MEDLINE ITEM 146417

## (undated) DEVICE — APPLICATOR CHLORAPREP CLR 10.5

## (undated) DEVICE — ELECTRODE REM PLYHSV RETURN 9

## (undated) DEVICE — TRAY MINOR ORTHO

## (undated) DEVICE — NDL 18GA X1 1/2 REG BEVEL

## (undated) DEVICE — DRAPE STERI U-SHAPED 47X51IN

## (undated) DEVICE — DRESSING TRANS 4X4 TEGADERM

## (undated) DEVICE — CATH DXTERITY JL40 100CM 6FR

## (undated) DEVICE — DRAPE THYROID WITH ARMBOARD

## (undated) DEVICE — APPLICATOR CHLORAPREP ORN 26ML

## (undated) DEVICE — TAPE SURG DURAPORE 2 X10YD

## (undated) DEVICE — K-WIRE THRD TIP 2.0MM D 280MM
Type: IMPLANTABLE DEVICE | Site: TIBIA | Status: NON-FUNCTIONAL
Removed: 2019-07-11

## (undated) DEVICE — SUT ETHILON 3/0 18IN PS-1

## (undated) DEVICE — KIT PT CARE HANA PROFX SSXT

## (undated) DEVICE — SHEET DRAPE FAN-FOLDED 3/4

## (undated) DEVICE — DRESSING TRANS 2X2 TEGADERM

## (undated) DEVICE — GLOVE PROTEXIS PI CLASSIC 8.0

## (undated) DEVICE — GLOVE TRIFLEX LATEX SURGICAL 8

## (undated) DEVICE — GUIDEWIRE EMERALD 150CM PTFE

## (undated) DEVICE — GLOVE BIOGEL SKINSENSE PI 8.5

## (undated) DEVICE — BANDAGE ESMARK 6X12

## (undated) DEVICE — KIT MICROINTRO 4F .018X40X7CM

## (undated) DEVICE — GUIDEWIRE ANG STF .035INX18CM

## (undated) DEVICE — DRESSING AQUACEL AG 3.5X10IN

## (undated) DEVICE — SUT VICRYL 3-0 27 SH

## (undated) DEVICE — DRAPE XRAY EQUIPMENT UNIV

## (undated) DEVICE — SPONGE GAUZE 16PLY 4X4

## (undated) DEVICE — SEE MEDLINE ITEM 152529

## (undated) DEVICE — DRAPE C ARM 42 X 120 10/BX

## (undated) DEVICE — SHEET THYROID W/ISO-BAC

## (undated) DEVICE — CARTRIDGE OIL

## (undated) DEVICE — GLOVE PROTEXIS PI CLASSIC 7.0

## (undated) DEVICE — PAD ABD 8X10 STERILE

## (undated) DEVICE — CLAMP EXT FX BNE HOLD COMPR

## (undated) DEVICE — SPONGE DERMACEA GAUZE 4X4

## (undated) DEVICE — SOL NS 1000CC

## (undated) DEVICE — SHEATH PINNACLE 8FR

## (undated) DEVICE — PACK TIBURON LAPAROSCOPY

## (undated) DEVICE — OMNIPAQUE 350 200ML

## (undated) DEVICE — SEE MEDLINE ITEM 146298

## (undated) DEVICE — SEE MEDLINE ITEM 146347

## (undated) DEVICE — SOL 9P NACL IRR PIC IL

## (undated) DEVICE — GOWN SURG 2XL DISP TIE BACK

## (undated) DEVICE — TUBE FRAZIER 5MM 2FT SOFT TIP

## (undated) DEVICE — SUT SILK 2-0 SH 18IN BLACK

## (undated) DEVICE — SUT ETHICON 3-0 BLK MONO PS

## (undated) DEVICE — SYR 10CC LUER LOCK

## (undated) DEVICE — STAPLER SKIN ROTATING HEAD

## (undated) DEVICE — SEE MEDLINE ITEM 146231

## (undated) DEVICE — DRESSING TRANS 4X4 3/4

## (undated) DEVICE — SET DECANTER MEDICHOICE

## (undated) DEVICE — GOWN SURGICAL X-LARGE

## (undated) DEVICE — SEE MEDLINE ITEM 152622

## (undated) DEVICE — SUT 2-0 VICRYL / SH (J417)

## (undated) DEVICE — DRESSING N ADH OIL EMUL 3X8

## (undated) DEVICE — DRAPE PLASTIC U 60X72

## (undated) DEVICE — CATH SUCTION 10FR

## (undated) DEVICE — STOCKINET 4INX48

## (undated) DEVICE — SEE MEDLINE ITEM 157116

## (undated) DEVICE — CATH ANGIO BRAID BERENSTEIN 5F

## (undated) DEVICE — SEE MEDLINE ITEM 157216

## (undated) DEVICE — COVER BACK TABLE 72X21

## (undated) DEVICE — SEE MEDLINE ITEM 156894

## (undated) DEVICE — KIT CUSTOM MANIFOLD

## (undated) DEVICE — SPIKE CONTRAST CONTROLLER

## (undated) DEVICE — SYR 30CC LUER LOCK

## (undated) DEVICE — DRILL BIT

## (undated) DEVICE — NDL 22GA X1 1/2 REG BEVEL

## (undated) DEVICE — GUIDEWIRE STF .035X180CM ANG

## (undated) DEVICE — TAPE MEDIPORE 4IN X 2YDS

## (undated) DEVICE — SUT 3-0 12-18IN SILK

## (undated) DEVICE — TOURNIQUET SB QC DP 34X4IN

## (undated) DEVICE — CATH DXTERITY PG145 110CM 6FR

## (undated) DEVICE — KIT PROBE COVER WITH GEL

## (undated) DEVICE — SPONGE LAP 18X18 PREWASHED

## (undated) DEVICE — PACK BASIC

## (undated) DEVICE — GAUZE SPONGE 4X4 12PLY

## (undated) DEVICE — CLIP MED TICALL

## (undated) DEVICE — BIT DRILL BONE QC 3.5X195MM SS

## (undated) DEVICE — BANDAGE ACE ELASTIC 6"

## (undated) DEVICE — SUT VICRYL PLUS 0 CT1 18IN

## (undated) DEVICE — COVER OVERHEAD SURG LT BLUE

## (undated) DEVICE — SUT LIGACLIP SMALL XTRA

## (undated) DEVICE — SCRUB HIBICLENS 4% CHG 4OZ

## (undated) DEVICE — SCREW LOCKING 5.0X 30MM
Type: IMPLANTABLE DEVICE | Site: TIBIA | Status: NON-FUNCTIONAL
Removed: 2019-07-11

## (undated) DEVICE — DRAPE C-ARMOR EQUIPMENT COVER

## (undated) DEVICE — COVER LIGHT HANDLE 80/CA

## (undated) DEVICE — SUT MCRYL PLUS 4-0 PS2 27IN

## (undated) DEVICE — SHEATH INTRODUCER 6FR 11CM

## (undated) DEVICE — BLADE SURG CARBON STEEL #10

## (undated) DEVICE — GUIDEWIRE AMPLATZ STIFF 260X7

## (undated) DEVICE — DRAPE OPTIMA MAJOR PEDIATRIC

## (undated) DEVICE — SEE MEDLINE ITEM 156918

## (undated) DEVICE — PAD CAST SPECIALIST STRL 6

## (undated) DEVICE — DIFFUSER

## (undated) DEVICE — MINI STICK MAX

## (undated) DEVICE — SEE MEDLINE ITEM 157150

## (undated) DEVICE — SEE MEDLINE ITEM 153151

## (undated) DEVICE — SEE MEDLINE ITEM 156955

## (undated) DEVICE — SYR MED RAD 150ML

## (undated) DEVICE — SUT VICRYL PLUS ANTIBACT

## (undated) DEVICE — UNDERGLOVE BIOGEL PI SZ 6.5 LF

## (undated) DEVICE — DRESSING TEGADERM 4.4X5IN

## (undated) DEVICE — COVERS PROBE NR-48 STERILE

## (undated) DEVICE — DRAPE STERI INSTRUMENT 1018

## (undated) DEVICE — GAUZE SPONGE 4'X4 12 PLY

## (undated) DEVICE — AV VASCULAR ACCESS PACK

## (undated) DEVICE — DRESSING TELFA STRL 4X3 LF

## (undated) DEVICE — SUT VICRYL 2-0 8-18 CP-2

## (undated) DEVICE — SEE MEDLINE ITEM 157117

## (undated) DEVICE — COVER INSTR ELASTIC BAND 40X20

## (undated) DEVICE — GLOVE BIOGEL SKINSENSE PI 6.5